# Patient Record
Sex: FEMALE | Race: WHITE | NOT HISPANIC OR LATINO | Employment: OTHER | URBAN - METROPOLITAN AREA
[De-identification: names, ages, dates, MRNs, and addresses within clinical notes are randomized per-mention and may not be internally consistent; named-entity substitution may affect disease eponyms.]

---

## 2017-01-02 ENCOUNTER — APPOINTMENT (OUTPATIENT)
Dept: LAB | Facility: HOSPITAL | Age: 77
End: 2017-01-02
Payer: MEDICARE

## 2017-01-02 DIAGNOSIS — I48.0 PAROXYSMAL ATRIAL FIBRILLATION (HCC): ICD-10-CM

## 2017-01-02 LAB
INR PPP: 1.27 (ref 0.86–1.16)
PROTHROMBIN TIME: 13.4 SECONDS (ref 9.4–11.7)

## 2017-01-02 PROCEDURE — 36415 COLL VENOUS BLD VENIPUNCTURE: CPT

## 2017-01-02 PROCEDURE — 85610 PROTHROMBIN TIME: CPT

## 2017-01-03 ENCOUNTER — GENERIC CONVERSION - ENCOUNTER (OUTPATIENT)
Dept: OTHER | Facility: OTHER | Age: 77
End: 2017-01-03

## 2017-01-05 ENCOUNTER — GENERIC CONVERSION - ENCOUNTER (OUTPATIENT)
Dept: OTHER | Facility: OTHER | Age: 77
End: 2017-01-05

## 2017-01-16 DIAGNOSIS — I48.0 PAROXYSMAL ATRIAL FIBRILLATION (HCC): ICD-10-CM

## 2017-01-16 DIAGNOSIS — I48.91 ATRIAL FIBRILLATION (HCC): ICD-10-CM

## 2017-01-16 DIAGNOSIS — I10 ESSENTIAL (PRIMARY) HYPERTENSION: ICD-10-CM

## 2017-01-19 ENCOUNTER — GENERIC CONVERSION - ENCOUNTER (OUTPATIENT)
Dept: OTHER | Facility: OTHER | Age: 77
End: 2017-01-19

## 2017-01-19 ENCOUNTER — ALLSCRIPTS OFFICE VISIT (OUTPATIENT)
Dept: OTHER | Facility: OTHER | Age: 77
End: 2017-01-19

## 2017-01-19 ENCOUNTER — APPOINTMENT (OUTPATIENT)
Dept: LAB | Facility: HOSPITAL | Age: 77
End: 2017-01-19
Payer: MEDICARE

## 2017-01-19 ENCOUNTER — TRANSCRIBE ORDERS (OUTPATIENT)
Dept: ADMINISTRATIVE | Facility: HOSPITAL | Age: 77
End: 2017-01-19

## 2017-01-19 DIAGNOSIS — I48.0 PAROXYSMAL ATRIAL FIBRILLATION (HCC): ICD-10-CM

## 2017-01-19 DIAGNOSIS — I10 ESSENTIAL HYPERTENSION, MALIGNANT: ICD-10-CM

## 2017-01-19 DIAGNOSIS — I48.0 PAROXYSMAL ATRIAL FIBRILLATION (HCC): Primary | ICD-10-CM

## 2017-01-19 LAB
ANION GAP SERPL CALCULATED.3IONS-SCNC: 7 MMOL/L (ref 4–13)
BUN SERPL-MCNC: 20 MG/DL (ref 5–25)
CALCIUM SERPL-MCNC: 9 MG/DL (ref 8.3–10.1)
CHLORIDE SERPL-SCNC: 103 MMOL/L (ref 100–108)
CO2 SERPL-SCNC: 31 MMOL/L (ref 21–32)
CREAT SERPL-MCNC: 0.76 MG/DL (ref 0.6–1.3)
ERYTHROCYTE [DISTWIDTH] IN BLOOD BY AUTOMATED COUNT: 15.3 % (ref 11.6–15.1)
GFR SERPL CREATININE-BSD FRML MDRD: >60 ML/MIN/1.73SQ M
GLUCOSE SERPL-MCNC: 89 MG/DL (ref 65–140)
HCT VFR BLD AUTO: 41.8 % (ref 37–47)
HGB BLD-MCNC: 13.6 G/DL (ref 12–16)
INR PPP: 2.22 (ref 0.86–1.16)
MCH RBC QN AUTO: 28.9 PG (ref 27–31)
MCHC RBC AUTO-ENTMCNC: 32.5 G/DL (ref 31.4–37.4)
MCV RBC AUTO: 89 FL (ref 82–98)
PLATELET # BLD AUTO: 198 THOUSANDS/UL (ref 130–400)
PMV BLD AUTO: 9.2 FL (ref 8.9–12.7)
POTASSIUM SERPL-SCNC: 4.5 MMOL/L (ref 3.5–5.3)
PROTHROMBIN TIME: 23.9 SECONDS (ref 9.4–11.7)
RBC # BLD AUTO: 4.71 MILLION/UL (ref 4.2–5.4)
SODIUM SERPL-SCNC: 141 MMOL/L (ref 136–145)
WBC # BLD AUTO: 7.9 THOUSAND/UL (ref 4.8–10.8)

## 2017-01-19 PROCEDURE — 80048 BASIC METABOLIC PNL TOTAL CA: CPT

## 2017-01-19 PROCEDURE — 85027 COMPLETE CBC AUTOMATED: CPT

## 2017-01-19 PROCEDURE — 85610 PROTHROMBIN TIME: CPT

## 2017-01-19 PROCEDURE — 36415 COLL VENOUS BLD VENIPUNCTURE: CPT

## 2017-01-20 ENCOUNTER — GENERIC CONVERSION - ENCOUNTER (OUTPATIENT)
Dept: OTHER | Facility: OTHER | Age: 77
End: 2017-01-20

## 2017-02-07 ENCOUNTER — ANESTHESIA EVENT (OUTPATIENT)
Dept: GASTROENTEROLOGY | Facility: HOSPITAL | Age: 77
End: 2017-02-07
Payer: MEDICARE

## 2017-02-07 RX ORDER — PRAMIPEXOLE DIHYDROCHLORIDE 0.25 MG/1
0.5 TABLET ORAL
Status: ON HOLD | COMMUNITY
End: 2017-09-20 | Stop reason: ALTCHOICE

## 2017-02-07 RX ORDER — GABAPENTIN 800 MG/1
800 TABLET ORAL 4 TIMES DAILY
COMMUNITY
End: 2018-02-28 | Stop reason: SDUPTHER

## 2017-02-07 RX ORDER — METOPROLOL TARTRATE 50 MG/1
50 TABLET, FILM COATED ORAL 2 TIMES DAILY
COMMUNITY
End: 2018-10-17 | Stop reason: SDUPTHER

## 2017-02-07 RX ORDER — DIPHENOXYLATE HYDROCHLORIDE AND ATROPINE SULFATE 2.5; .025 MG/1; MG/1
1 TABLET ORAL DAILY
COMMUNITY

## 2017-02-07 RX ORDER — LISINOPRIL 40 MG/1
40 TABLET ORAL DAILY
COMMUNITY
End: 2018-02-04 | Stop reason: SDUPTHER

## 2017-02-07 RX ORDER — PANTOPRAZOLE SODIUM 40 MG/1
40 TABLET, DELAYED RELEASE ORAL DAILY
COMMUNITY
End: 2018-03-12 | Stop reason: ALTCHOICE

## 2017-02-07 RX ORDER — WARFARIN SODIUM 5 MG/1
7.5 TABLET ORAL
COMMUNITY
End: 2018-10-17 | Stop reason: SDUPTHER

## 2017-02-08 ENCOUNTER — GENERIC CONVERSION - ENCOUNTER (OUTPATIENT)
Dept: OTHER | Facility: OTHER | Age: 77
End: 2017-02-08

## 2017-02-08 ENCOUNTER — HOSPITAL ENCOUNTER (OUTPATIENT)
Facility: HOSPITAL | Age: 77
Setting detail: OUTPATIENT SURGERY
Discharge: HOME | End: 2017-02-08
Attending: INTERNAL MEDICINE | Admitting: INTERNAL MEDICINE
Payer: MEDICARE

## 2017-02-08 ENCOUNTER — ANESTHESIA (OUTPATIENT)
Dept: GASTROENTEROLOGY | Facility: HOSPITAL | Age: 77
End: 2017-02-08
Payer: MEDICARE

## 2017-02-08 VITALS
OXYGEN SATURATION: 94 % | RESPIRATION RATE: 14 BRPM | HEIGHT: 64 IN | SYSTOLIC BLOOD PRESSURE: 108 MMHG | DIASTOLIC BLOOD PRESSURE: 60 MMHG | BODY MASS INDEX: 38.93 KG/M2 | HEART RATE: 80 BPM | WEIGHT: 228 LBS | TEMPERATURE: 97.5 F

## 2017-02-08 DIAGNOSIS — K62.5 HEMORRHAGE OF ANUS AND RECTUM: ICD-10-CM

## 2017-02-08 PROCEDURE — 88305 TISSUE EXAM BY PATHOLOGIST: CPT | Performed by: INTERNAL MEDICINE

## 2017-02-08 RX ORDER — EPHEDRINE SULFATE 50 MG/ML
INJECTION, SOLUTION INTRAVENOUS AS NEEDED
Status: DISCONTINUED | OUTPATIENT
Start: 2017-02-08 | End: 2017-02-08 | Stop reason: SURG

## 2017-02-08 RX ORDER — PROPOFOL 10 MG/ML
INJECTION, EMULSION INTRAVENOUS AS NEEDED
Status: DISCONTINUED | OUTPATIENT
Start: 2017-02-08 | End: 2017-02-08 | Stop reason: SURG

## 2017-02-08 RX ORDER — SODIUM CHLORIDE 9 MG/ML
20 INJECTION, SOLUTION INTRAVENOUS CONTINUOUS
Status: DISCONTINUED | OUTPATIENT
Start: 2017-02-08 | End: 2017-02-08 | Stop reason: HOSPADM

## 2017-02-08 RX ORDER — PROPOFOL 10 MG/ML
INJECTION, EMULSION INTRAVENOUS CONTINUOUS PRN
Status: DISCONTINUED | OUTPATIENT
Start: 2017-02-08 | End: 2017-02-08 | Stop reason: SURG

## 2017-02-08 RX ADMIN — PROPOFOL 100 MG: 10 INJECTION, EMULSION INTRAVENOUS at 15:23

## 2017-02-08 RX ADMIN — EPHEDRINE SULFATE 5 MG: 50 INJECTION, SOLUTION INTRAMUSCULAR; INTRAVENOUS; SUBCUTANEOUS at 15:44

## 2017-02-08 RX ADMIN — PROPOFOL 120 MCG/KG/MIN: 10 INJECTION, EMULSION INTRAVENOUS at 15:23

## 2017-02-08 RX ADMIN — SODIUM CHLORIDE 20 ML/HR: 0.9 INJECTION, SOLUTION INTRAVENOUS at 15:02

## 2017-02-13 ENCOUNTER — GENERIC CONVERSION - ENCOUNTER (OUTPATIENT)
Dept: OTHER | Facility: OTHER | Age: 77
End: 2017-02-13

## 2017-02-14 ENCOUNTER — ALLSCRIPTS OFFICE VISIT (OUTPATIENT)
Dept: OTHER | Facility: OTHER | Age: 77
End: 2017-02-14

## 2017-02-20 DIAGNOSIS — I48.0 PAROXYSMAL ATRIAL FIBRILLATION (HCC): ICD-10-CM

## 2017-02-24 ENCOUNTER — APPOINTMENT (OUTPATIENT)
Dept: AUDIOLOGY | Facility: CLINIC | Age: 77
End: 2017-02-24
Payer: MEDICARE

## 2017-02-24 PROCEDURE — 92567 TYMPANOMETRY: CPT | Performed by: AUDIOLOGIST

## 2017-02-24 PROCEDURE — 92557 COMPREHENSIVE HEARING TEST: CPT | Performed by: AUDIOLOGIST

## 2017-02-25 ENCOUNTER — APPOINTMENT (OUTPATIENT)
Dept: LAB | Facility: HOSPITAL | Age: 77
End: 2017-02-25
Payer: MEDICARE

## 2017-02-25 ENCOUNTER — TRANSCRIBE ORDERS (OUTPATIENT)
Dept: ADMINISTRATIVE | Facility: HOSPITAL | Age: 77
End: 2017-02-25

## 2017-02-25 DIAGNOSIS — I48.0 PAROXYSMAL ATRIAL FIBRILLATION (HCC): ICD-10-CM

## 2017-02-25 DIAGNOSIS — I48.91 ATRIAL FIBRILLATION, UNSPECIFIED TYPE (HCC): Primary | ICD-10-CM

## 2017-02-25 DIAGNOSIS — I48.0 PAROXYSMAL ATRIAL FIBRILLATION (HCC): Primary | ICD-10-CM

## 2017-02-25 DIAGNOSIS — I10 ESSENTIAL HYPERTENSION, MALIGNANT: ICD-10-CM

## 2017-02-25 DIAGNOSIS — I48.91 ATRIAL FIBRILLATION, UNSPECIFIED TYPE (HCC): ICD-10-CM

## 2017-02-25 LAB
ALBUMIN SERPL BCP-MCNC: 3.7 G/DL (ref 3.5–5)
ALP SERPL-CCNC: 79 U/L (ref 46–116)
ALT SERPL W P-5'-P-CCNC: 29 U/L (ref 12–78)
AST SERPL W P-5'-P-CCNC: 22 U/L (ref 5–45)
BILIRUB DIRECT SERPL-MCNC: 0.2 MG/DL (ref 0–0.2)
BILIRUB SERPL-MCNC: 0.8 MG/DL (ref 0.2–1)
CHOLEST SERPL-MCNC: 147 MG/DL (ref 50–200)
HDLC SERPL-MCNC: 41 MG/DL (ref 40–60)
INR PPP: 2.4 (ref 0.86–1.16)
LDLC SERPL CALC-MCNC: 84 MG/DL (ref 0–100)
PROT SERPL-MCNC: 7.6 G/DL (ref 6.4–8.2)
PROTHROMBIN TIME: 25.9 SECONDS (ref 9.4–11.7)
TRIGL SERPL-MCNC: 111 MG/DL
TSH SERPL DL<=0.05 MIU/L-ACNC: 1.1 UIU/ML (ref 0.36–3.74)

## 2017-02-25 PROCEDURE — 80061 LIPID PANEL: CPT | Performed by: INTERNAL MEDICINE

## 2017-02-25 PROCEDURE — 84443 ASSAY THYROID STIM HORMONE: CPT

## 2017-02-25 PROCEDURE — 36415 COLL VENOUS BLD VENIPUNCTURE: CPT

## 2017-02-25 PROCEDURE — 85610 PROTHROMBIN TIME: CPT

## 2017-02-25 PROCEDURE — 80076 HEPATIC FUNCTION PANEL: CPT

## 2017-02-27 ENCOUNTER — GENERIC CONVERSION - ENCOUNTER (OUTPATIENT)
Dept: OTHER | Facility: OTHER | Age: 77
End: 2017-02-27

## 2017-03-01 ENCOUNTER — GENERIC CONVERSION - ENCOUNTER (OUTPATIENT)
Dept: OTHER | Facility: OTHER | Age: 77
End: 2017-03-01

## 2017-03-10 ENCOUNTER — GENERIC CONVERSION - ENCOUNTER (OUTPATIENT)
Dept: OTHER | Facility: OTHER | Age: 77
End: 2017-03-10

## 2017-03-10 LAB
LEFT EYE DIABETIC RETINOPATHY: NORMAL
RIGHT EYE DIABETIC RETINOPATHY: NORMAL

## 2017-03-23 ENCOUNTER — TRANSCRIBE ORDERS (OUTPATIENT)
Dept: ADMINISTRATIVE | Facility: HOSPITAL | Age: 77
End: 2017-03-23

## 2017-03-23 DIAGNOSIS — S86.012A ACHILLES TENDON TEAR, LEFT, INITIAL ENCOUNTER: Primary | ICD-10-CM

## 2017-03-29 DIAGNOSIS — I48.0 PAROXYSMAL ATRIAL FIBRILLATION (HCC): ICD-10-CM

## 2017-03-30 ENCOUNTER — GENERIC CONVERSION - ENCOUNTER (OUTPATIENT)
Dept: OTHER | Facility: OTHER | Age: 77
End: 2017-03-30

## 2017-03-30 ENCOUNTER — TRANSCRIBE ORDERS (OUTPATIENT)
Dept: ADMINISTRATIVE | Facility: HOSPITAL | Age: 77
End: 2017-03-30

## 2017-03-30 ENCOUNTER — APPOINTMENT (OUTPATIENT)
Dept: LAB | Facility: HOSPITAL | Age: 77
End: 2017-03-30
Payer: MEDICARE

## 2017-03-30 ENCOUNTER — HOSPITAL ENCOUNTER (OUTPATIENT)
Dept: RADIOLOGY | Facility: HOSPITAL | Age: 77
Discharge: HOME | End: 2017-03-30
Attending: PODIATRIST | Admitting: RADIOLOGY
Payer: MEDICARE

## 2017-03-30 DIAGNOSIS — I48.0 PAROXYSMAL ATRIAL FIBRILLATION (HCC): Primary | ICD-10-CM

## 2017-03-30 DIAGNOSIS — I48.0 PAROXYSMAL ATRIAL FIBRILLATION (HCC): ICD-10-CM

## 2017-03-30 DIAGNOSIS — S86.012A ACHILLES TENDON TEAR, LEFT, INITIAL ENCOUNTER: ICD-10-CM

## 2017-03-30 LAB
INR PPP: 2.5 (ref 0.86–1.16)
PROTHROMBIN TIME: 27 SECONDS (ref 9.4–11.7)

## 2017-03-30 PROCEDURE — 85610 PROTHROMBIN TIME: CPT

## 2017-03-30 PROCEDURE — 76881 US COMPL JOINT R-T W/IMG: CPT

## 2017-03-30 PROCEDURE — 36415 COLL VENOUS BLD VENIPUNCTURE: CPT

## 2017-04-03 ENCOUNTER — APPOINTMENT (OUTPATIENT)
Dept: LAB | Facility: HOSPITAL | Age: 77
End: 2017-04-03
Payer: MEDICARE

## 2017-04-03 DIAGNOSIS — I48.0 PAROXYSMAL ATRIAL FIBRILLATION (HCC): ICD-10-CM

## 2017-04-03 LAB
INR PPP: 2.29 (ref 0.86–1.16)
PROTHROMBIN TIME: 24.7 SECONDS (ref 9.4–11.7)

## 2017-04-03 PROCEDURE — 85610 PROTHROMBIN TIME: CPT

## 2017-04-04 ENCOUNTER — GENERIC CONVERSION - ENCOUNTER (OUTPATIENT)
Dept: OTHER | Facility: OTHER | Age: 77
End: 2017-04-04

## 2017-04-19 ENCOUNTER — ALLSCRIPTS OFFICE VISIT (OUTPATIENT)
Dept: OTHER | Facility: OTHER | Age: 77
End: 2017-04-19

## 2017-05-03 ENCOUNTER — ALLSCRIPTS OFFICE VISIT (OUTPATIENT)
Dept: OTHER | Facility: OTHER | Age: 77
End: 2017-05-03

## 2017-05-03 DIAGNOSIS — I48.0 PAROXYSMAL ATRIAL FIBRILLATION (HCC): ICD-10-CM

## 2017-05-16 ENCOUNTER — TRANSCRIBE ORDERS (OUTPATIENT)
Dept: ADMINISTRATIVE | Facility: HOSPITAL | Age: 77
End: 2017-05-16

## 2017-05-16 ENCOUNTER — APPOINTMENT (OUTPATIENT)
Dept: LAB | Facility: HOSPITAL | Age: 77
End: 2017-05-16
Payer: MEDICARE

## 2017-05-16 DIAGNOSIS — I48.0 PAROXYSMAL ATRIAL FIBRILLATION (HCC): ICD-10-CM

## 2017-05-16 LAB
INR PPP: 2.3 (ref 0.86–1.16)
PROTHROMBIN TIME: 24.8 SECONDS (ref 9.4–11.7)

## 2017-05-16 PROCEDURE — 85610 PROTHROMBIN TIME: CPT

## 2017-05-16 PROCEDURE — 36415 COLL VENOUS BLD VENIPUNCTURE: CPT

## 2017-05-17 ENCOUNTER — GENERIC CONVERSION - ENCOUNTER (OUTPATIENT)
Dept: OTHER | Facility: OTHER | Age: 77
End: 2017-05-17

## 2017-05-26 ENCOUNTER — GENERIC CONVERSION - ENCOUNTER (OUTPATIENT)
Dept: OTHER | Facility: OTHER | Age: 77
End: 2017-05-26

## 2017-06-07 DIAGNOSIS — I48.0 PAROXYSMAL ATRIAL FIBRILLATION (HCC): ICD-10-CM

## 2017-06-07 DIAGNOSIS — I10 ESSENTIAL (PRIMARY) HYPERTENSION: ICD-10-CM

## 2017-06-12 ENCOUNTER — TRANSCRIBE ORDERS (OUTPATIENT)
Dept: ADMINISTRATIVE | Facility: HOSPITAL | Age: 77
End: 2017-06-12

## 2017-06-12 ENCOUNTER — GENERIC CONVERSION - ENCOUNTER (OUTPATIENT)
Dept: OTHER | Facility: OTHER | Age: 77
End: 2017-06-12

## 2017-06-12 ENCOUNTER — APPOINTMENT (OUTPATIENT)
Dept: LAB | Facility: HOSPITAL | Age: 77
End: 2017-06-12
Payer: MEDICARE

## 2017-06-12 DIAGNOSIS — I48.0 PAROXYSMAL ATRIAL FIBRILLATION (HCC): ICD-10-CM

## 2017-06-12 DIAGNOSIS — I48.0 PAROXYSMAL ATRIAL FIBRILLATION (HCC): Primary | ICD-10-CM

## 2017-06-12 LAB
INR PPP: 2.3 (ref 0.86–1.16)
PROTHROMBIN TIME: 24.8 SECONDS (ref 9.4–11.7)

## 2017-06-12 PROCEDURE — 85610 PROTHROMBIN TIME: CPT

## 2017-06-12 PROCEDURE — 36415 COLL VENOUS BLD VENIPUNCTURE: CPT

## 2017-06-13 ENCOUNTER — GENERIC CONVERSION - ENCOUNTER (OUTPATIENT)
Dept: OTHER | Facility: OTHER | Age: 77
End: 2017-06-13

## 2017-06-14 ENCOUNTER — APPOINTMENT (OUTPATIENT)
Dept: LAB | Facility: HOSPITAL | Age: 77
End: 2017-06-14
Attending: PSYCHIATRY & NEUROLOGY
Payer: MEDICARE

## 2017-06-14 ENCOUNTER — TRANSCRIBE ORDERS (OUTPATIENT)
Dept: ADMINISTRATIVE | Facility: HOSPITAL | Age: 77
End: 2017-06-14

## 2017-06-14 DIAGNOSIS — E13.40 DIABETIC NEUROPATHY ASSOCIATED WITH OTHER SPECIFIED DIABETES MELLITUS: Primary | ICD-10-CM

## 2017-06-14 DIAGNOSIS — E13.40 DIABETIC NEUROPATHY ASSOCIATED WITH OTHER SPECIFIED DIABETES MELLITUS: ICD-10-CM

## 2017-06-14 LAB
EST. AVERAGE GLUCOSE BLD GHB EST-MCNC: 120 MG/DL
HBA1C MFR BLD: 5.8 % (ref 4.2–6.3)

## 2017-06-14 PROCEDURE — 83036 HEMOGLOBIN GLYCOSYLATED A1C: CPT

## 2017-06-14 PROCEDURE — 36415 COLL VENOUS BLD VENIPUNCTURE: CPT

## 2017-06-26 ENCOUNTER — ALLSCRIPTS OFFICE VISIT (OUTPATIENT)
Dept: OTHER | Facility: OTHER | Age: 77
End: 2017-06-26

## 2017-07-12 DIAGNOSIS — I48.0 PAROXYSMAL ATRIAL FIBRILLATION (HCC): ICD-10-CM

## 2017-07-14 ENCOUNTER — TRANSCRIBE ORDERS (OUTPATIENT)
Dept: ADMINISTRATIVE | Facility: HOSPITAL | Age: 77
End: 2017-07-14

## 2017-07-14 ENCOUNTER — APPOINTMENT (OUTPATIENT)
Dept: LAB | Facility: HOSPITAL | Age: 77
End: 2017-07-14
Payer: MEDICARE

## 2017-07-14 DIAGNOSIS — I48.0 PAROXYSMAL ATRIAL FIBRILLATION (HCC): ICD-10-CM

## 2017-07-14 DIAGNOSIS — I48.0 PAROXYSMAL ATRIAL FIBRILLATION (HCC): Primary | ICD-10-CM

## 2017-07-14 LAB
INR PPP: 2.98 (ref 0.86–1.16)
PROTHROMBIN TIME: 31.6 SECONDS (ref 9.4–11.7)

## 2017-07-14 PROCEDURE — 85610 PROTHROMBIN TIME: CPT

## 2017-07-14 PROCEDURE — 36415 COLL VENOUS BLD VENIPUNCTURE: CPT

## 2017-07-17 ENCOUNTER — GENERIC CONVERSION - ENCOUNTER (OUTPATIENT)
Dept: OTHER | Facility: OTHER | Age: 77
End: 2017-07-17

## 2017-07-19 ENCOUNTER — HOSPITAL ENCOUNTER (OUTPATIENT)
Dept: NON INVASIVE DIAGNOSTICS | Facility: CLINIC | Age: 77
Discharge: HOME | End: 2017-07-19
Payer: MEDICARE

## 2017-07-19 ENCOUNTER — APPOINTMENT (OUTPATIENT)
Dept: LAB | Facility: CLINIC | Age: 77
End: 2017-07-19
Payer: MEDICARE

## 2017-07-19 ENCOUNTER — ALLSCRIPTS OFFICE VISIT (OUTPATIENT)
Dept: OTHER | Facility: OTHER | Age: 77
End: 2017-07-19

## 2017-07-19 DIAGNOSIS — I48.0 PAROXYSMAL ATRIAL FIBRILLATION (HCC): ICD-10-CM

## 2017-07-19 DIAGNOSIS — I10 ESSENTIAL (PRIMARY) HYPERTENSION: ICD-10-CM

## 2017-07-19 LAB
ANION GAP SERPL CALCULATED.3IONS-SCNC: 8 MMOL/L (ref 4–13)
BUN SERPL-MCNC: 22 MG/DL (ref 5–25)
CALCIUM SERPL-MCNC: 9.6 MG/DL (ref 8.3–10.1)
CHLORIDE SERPL-SCNC: 105 MMOL/L (ref 100–108)
CO2 SERPL-SCNC: 31 MMOL/L (ref 21–32)
CREAT SERPL-MCNC: 0.79 MG/DL (ref 0.6–1.3)
GFR SERPL CREATININE-BSD FRML MDRD: >60 ML/MIN/1.73SQ M
GLUCOSE SERPL-MCNC: 93 MG/DL (ref 65–140)
POTASSIUM SERPL-SCNC: 4.4 MMOL/L (ref 3.5–5.3)
SODIUM SERPL-SCNC: 144 MMOL/L (ref 136–145)

## 2017-07-19 PROCEDURE — 80048 BASIC METABOLIC PNL TOTAL CA: CPT

## 2017-07-19 PROCEDURE — 36415 COLL VENOUS BLD VENIPUNCTURE: CPT

## 2017-07-19 PROCEDURE — 93306 TTE W/DOPPLER COMPLETE: CPT

## 2017-07-21 ENCOUNTER — GENERIC CONVERSION - ENCOUNTER (OUTPATIENT)
Dept: OTHER | Facility: OTHER | Age: 77
End: 2017-07-21

## 2017-08-01 ENCOUNTER — ALLSCRIPTS OFFICE VISIT (OUTPATIENT)
Dept: OTHER | Facility: OTHER | Age: 77
End: 2017-08-01

## 2017-08-10 ENCOUNTER — ALLSCRIPTS OFFICE VISIT (OUTPATIENT)
Dept: OTHER | Facility: OTHER | Age: 77
End: 2017-08-10

## 2017-08-16 DIAGNOSIS — Z12.31 ENCOUNTER FOR SCREENING MAMMOGRAM FOR MALIGNANT NEOPLASM OF BREAST: ICD-10-CM

## 2017-08-16 DIAGNOSIS — I48.0 PAROXYSMAL ATRIAL FIBRILLATION (HCC): ICD-10-CM

## 2017-08-25 ENCOUNTER — APPOINTMENT (OUTPATIENT)
Dept: LAB | Facility: HOSPITAL | Age: 77
End: 2017-08-25
Payer: MEDICARE

## 2017-08-25 ENCOUNTER — GENERIC CONVERSION - ENCOUNTER (OUTPATIENT)
Dept: OTHER | Facility: OTHER | Age: 77
End: 2017-08-25

## 2017-08-25 ENCOUNTER — TRANSCRIBE ORDERS (OUTPATIENT)
Dept: ADMINISTRATIVE | Facility: HOSPITAL | Age: 77
End: 2017-08-25

## 2017-08-25 DIAGNOSIS — I48.0 PAROXYSMAL ATRIAL FIBRILLATION (HCC): Primary | ICD-10-CM

## 2017-08-25 LAB
INR PPP: 3.51 (ref 0.86–1.16)
PROTHROMBIN TIME: 37.3 SECONDS (ref 9.4–11.7)

## 2017-08-25 PROCEDURE — 85610 PROTHROMBIN TIME: CPT | Performed by: INTERNAL MEDICINE

## 2017-08-25 PROCEDURE — 36415 COLL VENOUS BLD VENIPUNCTURE: CPT | Performed by: INTERNAL MEDICINE

## 2017-08-26 ENCOUNTER — GENERIC CONVERSION - ENCOUNTER (OUTPATIENT)
Dept: OTHER | Facility: OTHER | Age: 77
End: 2017-08-26

## 2017-08-29 ENCOUNTER — ALLSCRIPTS OFFICE VISIT (OUTPATIENT)
Dept: OTHER | Facility: OTHER | Age: 77
End: 2017-08-29

## 2017-09-08 ENCOUNTER — APPOINTMENT (OUTPATIENT)
Dept: LAB | Facility: HOSPITAL | Age: 77
End: 2017-09-08
Payer: MEDICARE

## 2017-09-08 LAB
INR PPP: 2.61 (ref 0.86–1.16)
PROTHROMBIN TIME: 27.7 SECONDS (ref 9.4–11.7)

## 2017-09-08 PROCEDURE — 85610 PROTHROMBIN TIME: CPT | Performed by: INTERNAL MEDICINE

## 2017-09-08 PROCEDURE — 36415 COLL VENOUS BLD VENIPUNCTURE: CPT | Performed by: INTERNAL MEDICINE

## 2017-09-11 ENCOUNTER — GENERIC CONVERSION - ENCOUNTER (OUTPATIENT)
Dept: OTHER | Facility: OTHER | Age: 77
End: 2017-09-11

## 2017-09-11 ENCOUNTER — ALLSCRIPTS OFFICE VISIT (OUTPATIENT)
Dept: OTHER | Facility: OTHER | Age: 77
End: 2017-09-11

## 2017-09-20 ENCOUNTER — ANESTHESIA EVENT (OUTPATIENT)
Dept: GASTROENTEROLOGY | Facility: HOSPITAL | Age: 77
End: 2017-09-20
Payer: MEDICARE

## 2017-09-20 ENCOUNTER — ANESTHESIA (OUTPATIENT)
Dept: GASTROENTEROLOGY | Facility: HOSPITAL | Age: 77
End: 2017-09-20
Payer: MEDICARE

## 2017-09-20 ENCOUNTER — GENERIC CONVERSION - ENCOUNTER (OUTPATIENT)
Dept: OTHER | Facility: OTHER | Age: 77
End: 2017-09-20

## 2017-09-20 ENCOUNTER — HOSPITAL ENCOUNTER (OUTPATIENT)
Facility: HOSPITAL | Age: 77
Setting detail: OUTPATIENT SURGERY
Discharge: HOME | End: 2017-09-20
Attending: INTERNAL MEDICINE | Admitting: INTERNAL MEDICINE
Payer: MEDICARE

## 2017-09-20 VITALS
RESPIRATION RATE: 16 BRPM | DIASTOLIC BLOOD PRESSURE: 74 MMHG | OXYGEN SATURATION: 100 % | SYSTOLIC BLOOD PRESSURE: 140 MMHG | TEMPERATURE: 97.6 F | HEART RATE: 60 BPM | HEIGHT: 64 IN | BODY MASS INDEX: 39.27 KG/M2 | WEIGHT: 230 LBS

## 2017-09-20 DIAGNOSIS — I48.0 PAROXYSMAL ATRIAL FIBRILLATION (HCC): ICD-10-CM

## 2017-09-20 DIAGNOSIS — K21.9 ESOPHAGEAL REFLUX: ICD-10-CM

## 2017-09-20 PROCEDURE — 88305 TISSUE EXAM BY PATHOLOGIST: CPT | Performed by: INTERNAL MEDICINE

## 2017-09-20 RX ORDER — PROPOFOL 10 MG/ML
INJECTION, EMULSION INTRAVENOUS CONTINUOUS PRN
Status: DISCONTINUED | OUTPATIENT
Start: 2017-09-20 | End: 2017-09-20 | Stop reason: SURG

## 2017-09-20 RX ORDER — LIDOCAINE HYDROCHLORIDE 10 MG/ML
INJECTION, SOLUTION EPIDURAL; INFILTRATION; INTRACAUDAL; PERINEURAL AS NEEDED
Status: DISCONTINUED | OUTPATIENT
Start: 2017-09-20 | End: 2017-09-20 | Stop reason: SURG

## 2017-09-20 RX ORDER — SODIUM CHLORIDE 9 MG/ML
50 INJECTION, SOLUTION INTRAVENOUS CONTINUOUS
Status: DISCONTINUED | OUTPATIENT
Start: 2017-09-20 | End: 2017-09-20 | Stop reason: HOSPADM

## 2017-09-20 RX ORDER — PROPOFOL 10 MG/ML
INJECTION, EMULSION INTRAVENOUS AS NEEDED
Status: DISCONTINUED | OUTPATIENT
Start: 2017-09-20 | End: 2017-09-20 | Stop reason: SURG

## 2017-09-20 RX ORDER — CLOBETASOL PROPIONATE 0.5 MG/G
CREAM TOPICAL WEEKLY
COMMUNITY
Start: 2018-12-01 | End: 2020-05-14 | Stop reason: SDUPTHER

## 2017-09-20 RX ADMIN — PROPOFOL 50 MG: 10 INJECTION, EMULSION INTRAVENOUS at 14:51

## 2017-09-20 RX ADMIN — PROPOFOL 50 MCG/KG/MIN: 10 INJECTION, EMULSION INTRAVENOUS at 14:51

## 2017-09-20 RX ADMIN — LIDOCAINE HYDROCHLORIDE 50 MG: 10 INJECTION, SOLUTION EPIDURAL; INFILTRATION; INTRACAUDAL; PERINEURAL at 14:50

## 2017-09-26 ENCOUNTER — GENERIC CONVERSION - ENCOUNTER (OUTPATIENT)
Dept: OTHER | Facility: OTHER | Age: 77
End: 2017-09-26

## 2017-10-04 ENCOUNTER — TRANSCRIBE ORDERS (OUTPATIENT)
Dept: ADMINISTRATIVE | Facility: HOSPITAL | Age: 77
End: 2017-10-04

## 2017-10-04 ENCOUNTER — APPOINTMENT (OUTPATIENT)
Dept: LAB | Facility: HOSPITAL | Age: 77
End: 2017-10-04
Payer: MEDICARE

## 2017-10-04 ENCOUNTER — GENERIC CONVERSION - ENCOUNTER (OUTPATIENT)
Dept: OTHER | Facility: OTHER | Age: 77
End: 2017-10-04

## 2017-10-04 DIAGNOSIS — I48.0 PAROXYSMAL ATRIAL FIBRILLATION (HCC): Primary | ICD-10-CM

## 2017-10-04 DIAGNOSIS — I48.0 PAROXYSMAL ATRIAL FIBRILLATION (HCC): ICD-10-CM

## 2017-10-04 LAB
INR PPP: 1.72 (ref 0.86–1.16)
PROTHROMBIN TIME: 18.2 SECONDS (ref 9.4–11.7)

## 2017-10-04 PROCEDURE — 36415 COLL VENOUS BLD VENIPUNCTURE: CPT

## 2017-10-04 PROCEDURE — 85610 PROTHROMBIN TIME: CPT

## 2017-10-17 ENCOUNTER — GENERIC CONVERSION - ENCOUNTER (OUTPATIENT)
Dept: OTHER | Facility: OTHER | Age: 77
End: 2017-10-17

## 2017-10-19 ENCOUNTER — APPOINTMENT (OUTPATIENT)
Dept: LAB | Facility: HOSPITAL | Age: 77
End: 2017-10-19
Payer: MEDICARE

## 2017-10-19 ENCOUNTER — GENERIC CONVERSION - ENCOUNTER (OUTPATIENT)
Dept: OTHER | Facility: OTHER | Age: 77
End: 2017-10-19

## 2017-10-19 DIAGNOSIS — I48.0 PAROXYSMAL ATRIAL FIBRILLATION (HCC): ICD-10-CM

## 2017-10-19 LAB
INR PPP: 2.83 (ref 0.86–1.16)
PROTHROMBIN TIME: 30 SECONDS (ref 9.4–11.7)

## 2017-10-19 PROCEDURE — 36415 COLL VENOUS BLD VENIPUNCTURE: CPT

## 2017-10-19 PROCEDURE — 85610 PROTHROMBIN TIME: CPT

## 2017-10-23 ENCOUNTER — GENERIC CONVERSION - ENCOUNTER (OUTPATIENT)
Dept: OTHER | Facility: OTHER | Age: 77
End: 2017-10-23

## 2017-10-25 ENCOUNTER — ALLSCRIPTS OFFICE VISIT (OUTPATIENT)
Dept: OTHER | Facility: OTHER | Age: 77
End: 2017-10-25

## 2017-10-25 DIAGNOSIS — Z91.89 OTHER SPECIFIED PERSONAL RISK FACTORS, NOT ELSEWHERE CLASSIFIED: ICD-10-CM

## 2017-10-25 DIAGNOSIS — Z12.31 ENCOUNTER FOR SCREENING MAMMOGRAM FOR MALIGNANT NEOPLASM OF BREAST: ICD-10-CM

## 2017-10-25 DIAGNOSIS — I48.0 PAROXYSMAL ATRIAL FIBRILLATION (HCC): ICD-10-CM

## 2017-10-27 ENCOUNTER — ALLSCRIPTS OFFICE VISIT (OUTPATIENT)
Dept: OTHER | Facility: OTHER | Age: 77
End: 2017-10-27

## 2017-10-30 ENCOUNTER — ALLSCRIPTS OFFICE VISIT (OUTPATIENT)
Dept: OTHER | Facility: OTHER | Age: 77
End: 2017-10-30

## 2017-11-01 NOTE — PROGRESS NOTES
Assessment  1  Esophageal reflux (530 81) (K21 9)    Plan  Esophageal reflux    · RaNITidine HCl - 150 MG Oral Tablet (Zantac); TAKE 1 TABLET AT BEDTIME   Rx By: Margaret Donohue; Dispense: 90 Days ; #:90 Tablet; Refill: 1;For: Esophageal reflux; JAGUAR = N; Rx auto-faxed to [address]; Last Updated By: System, [employer]; 10/30/2017 10:20:44 AM   · Follow-up visit in 3 months Evaluation and Treatment  Follow-up  Status: Complete   Done: 12EWG4938   Ordered; For: Esophageal reflux; Ordered By: Margaret Donohue Performed:  Due: 32MZQ3157; Last Updated By: Abdifatah Stinson; 10/30/2017 10:26:22 AM    Discussion/Summary  Discussion Summary:   1  Chronic gastroesophageal reflux disease with possible symptoms of LPR  There was some concern about Carrero's esophagus in the past however this was not seen on the last 2 endoscopies with the last 1 being as recent as September 2017  since there is no evidence of Carrero's esophagus we discussed regarding the long-term maintenance of these medications  She is obviously concerned about side effects including chronic kidney disease and Alzheimer's type dementia however I did assure her that these studies have not been able to successfully prove an association  However due to her concerns we can certainly start ranitidine 150 mg at night  She will taper her omeprazole off  If she does well on that ranitidine we can potentially start to taper her off at that as well in the next 3 months  However the symptoms and also controlled she will go back on the omeprazole  If she was certainly like to come off the medications that we can discuss regarding STRETTA procedure versus fundoplication in the future  diet was discussed and recommended  Colon cancer screening  She is due for colonoscopy in 2020  Follow-up in the office in the next 3 months  Chief Complaint  Chief Complaint Free Text Note Form: Patient follow up for Carrero's  EGD done 9/20/17        History of Present Illness  HPI: 29-year-old lady who presents for follow-up off her gastroesophageal reflux and endoscopy  Her endoscopy was done in September 2017 which was unremarkable  She has been on Protonix and her symptoms have been doing well  She does occasionally have some hoarseness  She does feel gagging with pills  She has a postnasal drip  She has does see ENT physician  interest is that she had an endoscopy done in 2015 in [place] and was initially found to have Carrero's esophagus in 2012 but then in 2015 was negative except for esophagitis  At this time as well we did not see any evidence of Carrero's esophagus  She is concerned about potential side effects with the PPIs  History Reviewed: The history was obtained today from the patient and I agree with the documented history  Review of Systems  Complete-Female GI Adult:   Constitutional: No fever, no chills, feels well, no tiredness, no recent weight gain or weight loss  Eyes: No complaints of eye pain, no red eyes, no eyesight problems, no discharge, no dry eyes, no itching of eyes  ENT: no complaints of earache, no loss of hearing, no nose bleeds, no nasal discharge, no sore throat, no hoarseness  Cardiovascular: No complaints of slow heart rate, no fast heart rate, no chest pain, no palpitations, no leg claudication, no lower extremity edema  Respiratory: No complaints of shortness of breath, no wheezing, no cough, no SOB on exertion, no orthopnea, no PND  Gastrointestinal: acid reflux, but-- No complaints of abdominal pain, no constipation, no nausea or vomiting, no diarrhea, no bloody stools  Genitourinary: No complaints of dysuria, no incontinence, no pelvic pain, no dysmenorrhea, no vaginal discharge or bleeding  Musculoskeletal: No complaints of arthralgias, no myalgias, no joint swelling or stiffness, no limb pain or swelling  Integumentary: No complaints of skin rash or lesions, no itching, no skin wounds, no breast pain or lump     Neurological: No complaints of headache, no confusion, no convulsions, no numbness, no dizziness or fainting, no tingling, no limb weakness, no difficulty walking  Psychiatric: Not suicidal, no sleep disturbance, no anxiety or depression, no change in personality, no emotional problems  Endocrine: No complaints of proptosis, no hot flashes, no muscle weakness, no deepening of the voice, no feelings of weakness  Hematologic/Lymphatic: No complaints of swollen glands, no swollen glands in the neck, does not bleed easily, does not bruise easily  ROS Reviewed:   ROS reviewed  Active Problems  1  Achilles tendinitis of left lower extremity (726 71) (M76 62)   2  Acquired ankle/foot deformity (736 70) (M21 969)   3  AF (paroxysmal atrial fibrillation) (427 31) (I48 0)   4  Anticoagulant long-term use (V58 61) (Z79 01)   5  Arthritis (716 90) (M19 90)   6  At risk for bone density loss (V49 89) (Z91 89)   7  Atrial fibrillation (427 31) (I48 91)   8  History of Breast Cancer (V10 3)   9  Difficulty walking (719 7) (R26 2)   10  Encounter for screening mammogram for breast cancer (V76 12) (Z12 31)   11  Esophageal reflux (530 81) (K21 9)   12  Foot pain, bilateral (729 5) (M79 671,M79 672)   13  Hiatal hernia (553 3) (K44 9)   14  History of Carrero's esophagus (V12 79) (Z87 19)   15  History of fall (V15 88) (Z91 81)   16  Hypertension (401 9) (I10)   17  Leg pain, left (729 5) (M79 605)   18  Lichen sclerosus et atrophicus (701 0) (L90 0)   19  Lumbar radiculopathy (724 4) (M54 16)   20  Multiple lung nodules (793 19) (R91 8)   21  Obesity (278 00) (E66 9)   22  Onychomycosis (110 1) (B35 1)   23  Osteopenia (733 90) (M85 80)   24  Pacemaker (V45 01) (Z95 0)   25  Peripheral neuropathy (356 9) (G62 9)   26  Pes planus of both feet (734) (M21 41,M21 42)   27  Plantar fasciitis of left foot (728 71) (M72 2)   28  Restless legs syndrome (333 94) (G25 81)    Past Medical History  1   History of Abnormal glucose (790 29) (R73 09)   2  Atrial fibrillation (427 31) (I48 91)   3  History of Breast Cancer (V10 3)   4  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)   5  Esophageal reflux (530 81) (K21 9)   6  Denied: History of Exposure To STD   7  History of Gross hematuria (599 71) (R31 0)   8  History of Hematoma (924 9) (T14 8XXA)   9  History of Hematoma of lower extremity, right, initial encounter (924 5) (S80 11XA)   10  History of backache (V13 59) (Z87 39)   11  History of Carrero's esophagus (V12 79) (Z87 19)   12  History of cataract (V12 49) (Z86 69)   13  History of chest pain (V13 89) (Z87 898)   14  History of fall (V15 88) (Z91 81)   15  History of hematuria (V13 09) (Z87 448)   16  History of postmenopausal hormone replacement therapy (V87 49) (Z92 29)   17  History of shortness of breath (V13 89) (Z87 898)   18  History of urinary incontinence (V13 09) (Z87 898)   19  History of Neck pain (723 1) (M54 2)   20  Normal delivery (650) (O80,Z37 9)   21  History of Right knee pain (719 46) (M25 561)   22  History of Ringing In The Ears (Tinnitus) (388 30)   23  History of Skin rash (782 1) (R21)   24  History of Traumatic blister of right lower extremity, initial encounter (916 2) (S80 821A)   25  History of UTI (lower urinary tract infection) (599 0) (N39 0)  Active Problems And Past Medical History Reviewed: The active problems and past medical history were reviewed and updated today  Surgical History  1  Denied: History of Abnormal Pap Smear Of Cervix   2  History of Breast Surgery Lumpectomy   3  History of Cataract Surgery   4  History of Diagnostic Cystoscopy   5  History of Excision Lesion Of Meniscus Or Capsule Knee   6  History of Pacemaker - Pulse Generator Replacement   7  History of Pacemaker Placement   8  History of Pacemaker Placement   9  History of Radiation Therapy   10  History of Total Abdominal Hysterectomy With Removal Of Both Ovaries  Surgical History Reviewed:    The surgical history was reviewed and updated today  Family History  Mother    1  Denied: Family history of Alcoholism and drug addiction in family   2  Denied: Family history of Anxiety and depression  Father    3  Denied: Family history of Alcoholism and drug addiction in family   4  Denied: Family history of Anxiety and depression   5  Family history of Coronary Artery Disease (V17 49)   6  Family history of abdominal aortic aneurysm (V17 49) (Z82 49)  Child    7  Denied: Family history of Alcoholism and drug addiction in family   6  Denied: Family history of Anxiety and depression  Sibling    9  Denied: Family history of Alcoholism and drug addiction in family   8  Denied: Family history of Anxiety and depression  Sister    6  Family history of Breast Cancer (V16 3)  Maternal Aunt    12  Family history of Colon Cancer (V16 0)   13  Family history of Colon Cancer (V16 0)  Family History    14  Denied: Family history of Diabetes Mellitus   15  Denied: Family history of Stroke Syndrome  Family History Reviewed: The family history was reviewed and updated today  Social History   · Being A Social Drinker   · Denied: History of Drug Use   · Former smoker (V15 82) (E21 731)   · Marital History - Currently    · Retired From Work  Social History Reviewed: The social history was reviewed and updated today  Current Meds   1  Clobetasol Propionate 0 05 % External Ointment; Apply to affected area twice weekly; Therapy: 80GIS1809 to (Last ON:29ILW9643)  Requested for: 25Oct2017 Ordered   2  Gabapentin 800 MG Oral Tablet; TAKE 1 TABLET 4 TIMES DAILY; Therapy: 45SWX7051 to Recorded   3  Lisinopril 40 MG Oral Tablet; TAKE ONE TABLET BY MOUTH EVERY DAY AS DIRECTED; Therapy: 21GXA6533 to (Evaluate:22Mar2018)  Requested for: 27Mar2017; Last   Rx:27Mar2017 Ordered   4  Metoprolol Tartrate 50 MG Oral Tablet; TAKE 1 TABLET TWICE DAILY  Requested for:   92IYU0817; Last Rx:27Mar2017 Ordered   5   Multivitamins CAPS; TAKE 1 CAPSULE DAILY; Therapy: (Recorded:11Mar2014) to Recorded   6  Pantoprazole Sodium 20 MG Oral Tablet Delayed Release; TAKE 1 TABLET DAILY; Therapy: 91FKR9604 to (Evaluate:21Fja0679)  Requested for: 24WOJ3987; Last   Rx:27Mar2017 Ordered   7  Pramipexole Dihydrochloride 0 5 MG Oral Tablet; TAKE 1 5 TABLETS Bedtime; Therapy: 13XOT6644 to (Last Rx:65Uwd6878) Ordered   8  Probiotic Oral Packet; Therapy: 49YFK1878 to Recorded   9  Savella 50 MG Oral Tablet; Therapy: 10WTD1946 to Recorded   10  Savella Titration Pack 12 5 & 25 & 50 MG Oral Miscellaneous; as directed; Therapy: 43LDY4920 to (Evaluate:10Oct2017)  Requested for: 67FBL9682; Last    Rx:43Ijz6673 Ordered   11  Warfarin Sodium 5 MG Oral Tablet; take 1 to 1 1/2 tabs by mouth daily or sa directed; Therapy: 22XUP0758 to 0479 89 05 16)  Requested for: 46CWA6632; Last    Rx:28Mar2017 Ordered  Medication List Reviewed: The medication list was reviewed and updated today  Allergies  1  duloxetine   2  LevoFLOXacin TABS   3  Cephalexin CAPS   4  Erythromycin Base TABS   5  Keflex TABS   6  Penicillins   7  Sulfa Drugs    Vitals  Vital Signs    Recorded: 02CAK7663 09:56AM   Heart Rate 96   Systolic 094, LUE, Sitting   Diastolic 82, LUE, Sitting   BP CUFF SIZE Large   Height 5 ft 4 in   Weight 233 lb 8 0 oz   BMI Calculated 40 08   BSA Calculated 2 09   O2 Saturation 97     Physical Exam    Constitutional   General appearance: No acute distress, well appearing and well nourished  Eyes   Conjunctiva and lids: No swelling, erythema or discharge  Pupils and irises: Equal, round and reactive to light  Ears, Nose, Mouth, and Throat   External inspection of ears and nose: Normal     Oropharynx: Normal with no erythema, edema, exudate or lesions  Pulmonary   Respiratory effort: No increased work of breathing or signs of respiratory distress  Auscultation of lungs: Clear to auscultation      Cardiovascular   Auscultation of heart: Normal rate and rhythm, normal S1 and S2, without murmurs  Examination of extremities for edema and/or varicosities: Normal     Abdomen   Abdomen: Non-tender, no masses  Liver and spleen: No hepatomegaly or splenomegaly  Lymphatic   Palpation of lymph nodes in neck: No lymphadenopathy  Musculoskeletal   Gait and station: Normal     Skin   Skin and subcutaneous tissue: Normal without rashes or lesions  Neurologic   Cranial nerves: Cranial nerves 2-12 intact  Psychiatric   Orientation to person, place, and time: Normal          Health Management  History of Benign colon polyp   COLONOSCOPY (GI, SURG); every 3 years; Last 95ALT5880; Next Due: 16Nxf1571; Active    Future Appointments    Date/Time Provider Specialty Site   12/07/2017 03:20 PM LAUREL Perez  Cardiology [address]   01/24/2018 02:00 PM Cardiology, Device Remote   [address]   04/25/2018 08:30 AM Cardiology, Device Remote   [address]   03/12/2018 11:00 AM Conchita Gupta MD Internal Medicine [facility] INTERNAL MED   12/05/2017 01:45 PM Bravo Blandon DPM Podiatry [facility]   01/23/2018 01:00 PM Elkin Zhong MD Gastroenterology Adult [address] [facility]   02/07/2018 01:45 PM LAUREL Haro   Neurology NEUROLOGY [facility]   Electronically signed by : Anastasia Jacobsen MD; Oct 31 2017  6:08PM EST                       (Author)

## 2017-11-17 ENCOUNTER — APPOINTMENT (OUTPATIENT)
Dept: LAB | Facility: HOSPITAL | Age: 77
End: 2017-11-17
Payer: MEDICARE

## 2017-11-17 ENCOUNTER — TRANSCRIBE ORDERS (OUTPATIENT)
Dept: ADMINISTRATIVE | Facility: HOSPITAL | Age: 77
End: 2017-11-17

## 2017-11-17 DIAGNOSIS — I48.0 PAROXYSMAL ATRIAL FIBRILLATION (HCC): ICD-10-CM

## 2017-11-17 LAB
INR PPP: 2.44 (ref 0.86–1.16)
PROTHROMBIN TIME: 25.8 SECONDS (ref 9.4–11.7)

## 2017-11-17 PROCEDURE — 36415 COLL VENOUS BLD VENIPUNCTURE: CPT

## 2017-11-17 PROCEDURE — 85610 PROTHROMBIN TIME: CPT

## 2017-11-20 ENCOUNTER — GENERIC CONVERSION - ENCOUNTER (OUTPATIENT)
Dept: OTHER | Facility: OTHER | Age: 77
End: 2017-11-20

## 2017-11-29 DIAGNOSIS — I48.0 PAROXYSMAL ATRIAL FIBRILLATION (HCC): ICD-10-CM

## 2017-12-01 DIAGNOSIS — M85.80 OTHER SPECIFIED DISORDERS OF BONE DENSITY AND STRUCTURE, UNSPECIFIED SITE: ICD-10-CM

## 2017-12-01 DIAGNOSIS — I48.0 PAROXYSMAL ATRIAL FIBRILLATION (HCC): ICD-10-CM

## 2017-12-04 ENCOUNTER — HOSPITAL ENCOUNTER (OUTPATIENT)
Dept: RADIOLOGY | Age: 77
Discharge: HOME | End: 2017-12-04
Payer: MEDICARE

## 2017-12-04 DIAGNOSIS — Z91.89 OTHER SPECIFIED PERSONAL RISK FACTORS, NOT ELSEWHERE CLASSIFIED: ICD-10-CM

## 2017-12-04 DIAGNOSIS — Z12.31 ENCOUNTER FOR SCREENING MAMMOGRAM FOR MALIGNANT NEOPLASM OF BREAST: ICD-10-CM

## 2017-12-04 PROCEDURE — G0202 SCR MAMMO BI INCL CAD: HCPCS

## 2017-12-04 PROCEDURE — 77080 DXA BONE DENSITY AXIAL: CPT

## 2017-12-04 PROCEDURE — 77063 BREAST TOMOSYNTHESIS BI: CPT

## 2017-12-05 ENCOUNTER — ALLSCRIPTS OFFICE VISIT (OUTPATIENT)
Dept: OTHER | Facility: OTHER | Age: 77
End: 2017-12-05

## 2017-12-06 NOTE — PROGRESS NOTES
Assessment  1  Acquired ankle/foot deformity (736 70) (M21 969)   2  Foot pain, bilateral (729 5) (M79 671,M79 672)   3  Lumbar radiculopathy (724 4) (M54 16)   4  Difficulty walking (719 7) (R26 2)   5  Onychomycosis (110 1) (B35 1)    Plan     · Savella 50 MG Oral Tablet; one tab po bid   · Follow-up visit in 9 weeks Evaluation and Treatment  Follow-up  Status: Hold For -Scheduling  Requested for: 82JCY2776   · Inspect your feet and legs daily if you have vascular disease ; Status:Complete;   Done:98Ypl0435 02:01PM   · There are many things you can do at home to ensure good foot health ; Status:Complete;  Done: 61TWI9690 02:01PM   · Wear shoes that give your toes plenty of room ; Status:Complete;   Done: 40Xuf360636:01PM    Discussion/Summary  The patient was counseled regarding instructions for management,-- prognosis,-- patient and family education,-- risks and benefits of treatment options,-- importance of compliance with treatment  Patient is able to Self-Care  Possible side effects of new medications were reviewed with the patient/guardian today  The treatment plan was reviewed with the patient/guardian  The patient/guardian understands and agrees with the treatment plan      Chief Complaint  Routine nail care      History of Present Illness  HPI: Patient is doing well with Cymbalta however she began have facial tingling  She discontinued medicine  However the medication was relieving her neuropathic pain  Review of Systems    ROS reviewed  Active Problems    1  Achilles tendinitis of left lower extremity (726 71) (M76 62)   2  Acquired ankle/foot deformity (736 70) (M21 969)   3  AF (paroxysmal atrial fibrillation) (427 31) (I48 0)   4  Anticoagulant long-term use (V58 61) (Z79 01)   5  Arthritis (716 90) (M19 90)   6  At risk for bone density loss (V49 89) (Z91 89)   7  Atrial fibrillation (427 31) (I48 91)   8  History of Breast Cancer (V10 3)   9  Difficulty walking (719 7) (R26 2)   10  Encounter for screening mammogram for breast cancer (V76 12) (Z12 31)   11  Esophageal reflux (530 81) (K21 9)   12  Foot pain, bilateral (729 5) (M79 671,M79 672)   13  Hiatal hernia (553 3) (K44 9)   14  History of Carrero's esophagus (V12 79) (Z87 19)   15  History of fall (V15 88) (Z91 81)   16  Hypertension (401 9) (I10)   17  Leg pain, left (729 5) (M79 605)   18  Lichen sclerosus et atrophicus (701 0) (L90 0)   19  Lumbar radiculopathy (724 4) (M54 16)   20  Multiple lung nodules (793 19) (R91 8)   21  Obesity (278 00) (E66 9)   22  Onychomycosis (110 1) (B35 1)   23  Osteopenia (733 90) (M85 80)   24  Pacemaker (V45 01) (Z95 0)   25  Peripheral neuropathy (356 9) (G62 9)   26  Pes planus of both feet (734) (M21 41,M21 42)   27  Plantar fasciitis of left foot (728 71) (M72 2)   28  Restless legs syndrome (333 94) (G25 81)    Past Medical History   · History of Abnormal glucose (790 29) (R73 09)   · Atrial fibrillation (427 31) (I48 91)   · History of Breast Cancer (V10 3)   · 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)   · Esophageal reflux (530 81) (K21 9)   · Denied: History of Exposure To STD   · History of Gross hematuria (599 71) (R31 0)   · History of Hematoma (924 9) (T14 8XXA)   · History of Hematoma of lower extremity, right, initial encounter (924 5) (S80 11XA)   · History of backache (V13 59) (Z87 39)   · History of Carrero's esophagus (V12 79) (Z87 19)   · History of cataract (V12 49) (Z86 69)   · History of chest pain (V13 89) (U76 979)   · History of fall (V15 88) (Z91 81)   · History of hematuria (V13 09) (Z87 448)   · History of postmenopausal hormone replacement therapy (V87 49) (Z92 29)   · History of shortness of breath (V13 89) (E62 397)   · History of urinary incontinence (V13 09) (Z87 898)   · History of Neck pain (723 1) (M54 2)   · Normal delivery (650) (O80,Z37  9)   · History of Right knee pain (719 46) (M25 561)   · History of Ringing In The Ears (Tinnitus) (388 30)   · History of Skin rash (782 1) (R21)   · History of Traumatic blister of right lower extremity, initial encounter (916 2) (E28 608W)   · History of UTI (lower urinary tract infection) (599 0) (N39 0)    The active problems and past medical history were reviewed and updated today  Surgical History   · Denied: History of Abnormal Pap Smear Of Cervix   · History of Breast Surgery Lumpectomy   · History of Cataract Surgery   · History of Diagnostic Cystoscopy   · History of Excision Lesion Of Meniscus Or Capsule Knee   · History of Pacemaker - Pulse Generator Replacement   · History of Pacemaker Placement   · History of Pacemaker Placement   · History of Radiation Therapy   · History of Total Abdominal Hysterectomy With Removal Of Both Ovaries    The surgical history was reviewed and updated today  Family History  Mother    · Denied: Family history of Alcoholism and drug addiction in family   · Denied: Family history of Anxiety and depression  Father    · Denied: Family history of Alcoholism and drug addiction in family   · Denied: Family history of Anxiety and depression   · Family history of Coronary Artery Disease (V17 49)   · Family history of abdominal aortic aneurysm (V17 49) (Z82 49)  Child    · Denied: Family history of Alcoholism and drug addiction in family   · Denied: Family history of Anxiety and depression  Sibling    · Denied: Family history of Alcoholism and drug addiction in family   · Denied: Family history of Anxiety and depression  Sister    · Family history of Breast Cancer (V16 3)  Maternal Aunt    · Family history of Colon Cancer (V16 0)   · Family history of Colon Cancer (V16 0)  Family History    · Denied: Family history of Diabetes Mellitus   · Denied: Family history of Stroke Syndrome    The family history was reviewed and updated today         Social History     · Being A Social Drinker   · Denied: History of Drug Use   · Former smoker (V15 82) (T09 961)   · 25 pack years, quit age 41   · Marital History - Currently    · Retired From Work   · 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  The social history was reviewed and updated today  Current Meds   1  Clobetasol Propionate 0 05 % External Ointment; Apply to affected area twice weekly; Therapy: 22WQU7166 to (Last GO:11SPD3488)  Requested for: 25Oct2017 Ordered   2  Gabapentin 800 MG Oral Tablet; TAKE 1 TABLET 4 TIMES DAILY; Therapy: 25BDV6826 to Recorded   3  Lisinopril 40 MG Oral Tablet; TAKE ONE TABLET BY MOUTH EVERY DAY AS DIRECTED; Therapy: 94MDA6531 to (Evaluate:22Mar2018)  Requested for: 27Mar2017; Last Rx:27Mar2017 Ordered   4  Metoprolol Tartrate 50 MG Oral Tablet; TAKE 1 TABLET TWICE DAILY  Requested for: 79SLA3938; Last Rx:27Mar2017 Ordered   5  Multivitamins CAPS; TAKE 1 CAPSULE DAILY; Therapy: (Recorded:11Mar2014) to Recorded   6  Pramipexole Dihydrochloride 0 5 MG Oral Tablet; TAKE 1 5 TABLETS Bedtime; Therapy: 23BRD5510 to (Last Rx:02Tld9507) Ordered   7  Probiotic Oral Packet; Therapy: 16YOU9913 to Recorded   8  RaNITidine HCl - 150 MG Oral Tablet; TAKE 1 TABLET AT BEDTIME; Therapy: 30KFC0415 to [id number] [id number])  Requested for: 85MLN4725; Last Rx:30Oct2017 Ordered   9  Savella 50 MG Oral Tablet; TAKE ONE TAB TWICE DAILY; Therapy: 49QWT0509 to (Last Rx:20Nov2017)  Requested for: 20Nov2017 Ordered   10  Savella 50 MG Oral Tablet; Therapy: 50GSX7898 to Recorded   11  Savella Titration Pack 12 5 & 25 & 50 MG Oral Miscellaneous; as directed; Therapy: 49SGW5723 to (Evaluate:10Oct2017)  Requested for: 52MUB0196; Last  Rx:43Xsa2664 Ordered   12  Warfarin Sodium 5 MG Oral Tablet; take 1 to 1 1/2 tabs by mouth daily or sa directed; Therapy: 09JSW3635 to Radha Sequin)  Requested for: 90VDI6286; Last  Rx:28Mar2017 Ordered    The medication list was reviewed and updated today  Allergies  1  duloxetine   2  LevoFLOXacin TABS   3  Cephalexin CAPS   4  Erythromycin Base TABS   5  Keflex TABS   6  Penicillins   7   Sulfa Drugs    Vitals   Recorded: 29EUM8684 01:46PM   Heart Rate 78   Respiration 16   Systolic 472   Diastolic 81   Height 5 ft 4 in   Weight 233 lb    BMI Calculated 39 99   BSA Calculated 2 09     Physical Exam  Left Foot: Appearance: Normal except as noted: excessive pronation-- and-- pes planus  Tenderness: None except the lisfranc joint-- and-- medial longitudinal arch  ROM: subtalar motion was restricted  Right Foot: Appearance: Normal except as noted: excessive pronation-- and-- pes planus  Tenderness: None except the lisfranc joint-- and-- medial longitudinal arch  ROM: subtalar motion was restricted   Left Ankle: ROM: limited ROM in all planes   Right Ankle: ROM: limited ROM in all planes   Neurological Exam: performed  Light touch was decreased bilaterally  Vibratory sensation was decreased in both first metatarsophalangeal joints  Deep tendon reflexes: achilles reflex absent bilateraly-- and-- 4/5 L5 testing bilateral    Vascular Exam: performed Dorsalis pedis pulses were diminished bilaterally  Posterior tibial pulses were diminished bilaterally  Dependence rubor was present bilaterally  Capillary refill time was Negative digital hair noted, but-- between 1-3 seconds bilaterally  Toenails: All of the toenails were elongated,-- hypertrophied,-- discolored-- and--   Hyperkeratosis: present on both first toes  Shoe Gear Evaluation: performed ()  Recommendation(s): SAS style  Procedure  Nails debrided  Bilateral preulcerative lesions debrided as well  Procedure without pain or complication  Refill of Savella ordered        Future Appointments    Date/Time Provider Specialty Site   01/24/2018 02:00 PM Cardiology, Device Remote   [address]   04/25/2018 08:30 AM Cardiology, Device Remote   [address]   03/12/2018 11:00 AM Manjit Gupta MD Internal Medicine [facility] INTERNAL MED   01/23/2018 01:00 PM Francisca Ba MD Gastroenterology Adult [facility] GASTROENTEROLOGY [place]   02/07/2018 01:45 PM LAUREL House   Neurology NEUROLOGY [facility]   Electronically signed by : Ramirez Ferrer DPM; Dec  5 2017  2:02PM EST                       (Author)

## 2017-12-07 ENCOUNTER — GENERIC CONVERSION - ENCOUNTER (OUTPATIENT)
Dept: OTHER | Facility: OTHER | Age: 77
End: 2017-12-07

## 2017-12-14 ENCOUNTER — HOSPITAL ENCOUNTER (EMERGENCY)
Facility: HOSPITAL | Age: 77
Discharge: HOME | End: 2017-12-14
Attending: EMERGENCY MEDICINE | Admitting: EMERGENCY MEDICINE
Payer: MEDICARE

## 2017-12-14 VITALS
RESPIRATION RATE: 18 BRPM | BODY MASS INDEX: 40.51 KG/M2 | OXYGEN SATURATION: 97 % | SYSTOLIC BLOOD PRESSURE: 150 MMHG | DIASTOLIC BLOOD PRESSURE: 87 MMHG | HEART RATE: 88 BPM | WEIGHT: 236 LBS | TEMPERATURE: 97.4 F

## 2017-12-14 DIAGNOSIS — S83.242A ACUTE MEDIAL MENISCUS TEAR OF LEFT KNEE, INITIAL ENCOUNTER: Primary | ICD-10-CM

## 2017-12-14 PROCEDURE — 99283 EMERGENCY DEPT VISIT LOW MDM: CPT

## 2017-12-14 RX ORDER — TRAMADOL HYDROCHLORIDE 50 MG/1
50 TABLET ORAL EVERY 6 HOURS PRN
Qty: 15 TABLET | Refills: 0 | Status: SHIPPED | OUTPATIENT
Start: 2017-12-14 | End: 2018-03-12 | Stop reason: ALTCHOICE

## 2017-12-14 NOTE — DISCHARGE INSTRUCTIONS
Meniscus Tear   WHAT YOU NEED TO KNOW:   A meniscus tear is a tear in the cartilage of your knee  The meniscus is a piece of cartilage (strong tissue) between your thighbone and shinbone  The meniscus helps to cushion your knee joint and keep it stable  DISCHARGE INSTRUCTIONS:   Call 911 for any of the following:   · Your arm or leg feels warm, tender, and painful  It may look swollen and red  Return to the emergency department if:   · You cannot move your knee at all  Contact your healthcare provider if:   · Your symptoms do not improve with treatment  · You have questions or concerns about your condition or care  Medicines:   · NSAIDs , such as ibuprofen, help decrease swelling, pain, and fever  This medicine is available with or without a doctor's order  NSAIDs can cause stomach bleeding or kidney problems in certain people  If you take blood thinner medicine, always ask if NSAIDs are safe for you  Always read the medicine label and follow directions  Do not give these medicines to children under 10months of age without direction from your child's healthcare provider  · Take your medicine as directed  Contact your healthcare provider if you think your medicine is not helping or if you have side effects  Tell him of her if you are allergic to any medicine  Keep a list of the medicines, vitamins, and herbs you take  Include the amounts, and when and why you take them  Bring the list or the pill bottles to follow-up visits  Carry your medicine list with you in case of an emergency  Follow up with your healthcare provider as directed:  Write down your questions so you remember to ask them during your visits  Self-care:   · Rest  your knee  Avoid activities that make the swelling or pain worse  You may need to avoid putting weight on your leg while you have pain  Your healthcare provider may recommend that you use crutches      · Apply ice  on your knee for 15 to 20 minutes every hour or as directed  Use an ice pack, or put crushed ice in a plastic bag  Cover it with a towel  Ice helps prevent tissue damage and decreases swelling and pain  · Compress  your knee with an elastic bandage, air cast, medical boot, or splint to reduce swelling  Ask your healthcare provider which compression device to use, and how tight it should be  · Elevate  your knee above the level of your heart as often as you can  This will help decrease swelling and pain  Prop your knee on pillows or blankets to keep it elevated comfortably  © 2017 [facility] Information is for End User's use only and may not be sold, redistributed or otherwise used for commercial purposes  All illustrations and images included in CareNotes® are the copyrighted property of [employer]  or Patrick Coppola  The above information is an  only  It is not intended as medical advice for individual conditions or treatments  Talk to your doctor, nurse or pharmacist before following any medical regimen to see if it is safe and effective for you

## 2017-12-14 NOTE — ED PROVIDER NOTES
History  Chief Complaint   Patient presents with    Knee Pain     Pt ambulatory on unit c/o L knee pain for 2 weeks  Denies injury or additional symptoms  46-year-old female presents with chief complaint of left knee pain  Pain is over the medial aspect radiating around to the rear the knee  Onset was 2 weeks ago  Pain is exacerbated by getting up from a seated position  No significant swelling  Patient denies any recent trauma  Patient reports prior history of meniscal injury to this knee requiring surgery however that was on the lateral aspect and several years ago  No calf tenderness  No leg swelling  History provided by:  Patient   used: No    Knee Pain   Location:  Knee  Time since incident:  2 weeks  Injury: no    Knee location:  L knee  Pain details:     Quality:  Aching and sharp    Radiates to:  Does not radiate    Severity:  No pain    Timing:  Constant  Chronicity:  New  Dislocation: no    Foreign body present:  No foreign bodies  Prior injury to area:  No  Relieved by:  Rest  Worsened by:  Bearing weight  Ineffective treatments:  None tried  Associated symptoms: no fever    Risk factors: obesity    Risk factors: no concern for non-accidental trauma        Prior to Admission Medications   Prescriptions Last Dose Informant Patient Reported? Taking?    Calcium Acetate, Phos Binder, (CALCIUM ACETATE PO)   Yes No   Sig: Take by mouth   PRAMIPEXOLE DIHYDROCHLORIDE PO   Yes No   Sig: Take 5 mg by mouth 2 (two) times a day 1 1/2 tabs x 2 daily   clobetasol (TEMOVATE) 0 05 % cream   Yes No   Sig: Apply topically 2 (two) times a week   gabapentin (NEURONTIN) 800 mg tablet   Yes No   Sig: Take 800 mg by mouth 4 (four) times a day   lisinopril (ZESTRIL) 40 mg tablet   Yes No   Sig: Take 40 mg by mouth daily   metoprolol tartrate (LOPRESSOR) 50 mg tablet   Yes No   Sig: Take 50 mg by mouth 2 (two) times a day   multivitamin (THERAGRAN) TABS   Yes No   Sig: Take 1 tablet by mouth daily   pantoprazole (PROTONIX) 40 mg tablet   Yes No   Sig: Take 40 mg by mouth daily   warfarin (COUMADIN) 5 mg tablet   Yes No   Sig: Take 7 5 mg by mouth daily        Facility-Administered Medications: None       Past Medical History:   Diagnosis Date    Atrial fibrillation (HCC)     Cancer (HCC)     Left Breast, Lumpectomy    Esophageal reflux     Hiatal hernia     Hypertension     Irregular heart beat     AFIB    Neuropathy     Pacemaker     Paroxysmal atrial fibrillation (HCC)     Peripheral neuropathy     Rectal bleeding     Restless leg syndrome        Past Surgical History:   Procedure Laterality Date    BREAST SURGERY      CARDIAC PACEMAKER PLACEMENT      CATARACT EXTRACTION      HYSTERECTOMY      KNEE SURGERY      ID COLONOSCOPY FLX DX W/COLLJ SPEC WHEN PFRMD N/A 2/8/2017    Procedure: COLONOSCOPY;  Surgeon: Sai Farrar MD;  [facility]; Service: Gastroenterology    ID ESOPHAGOGASTRODUODENOSCOPY TRANSORAL DIAGNOSTIC N/A 9/20/2017    Procedure: ESOPHAGOGASTRODUODENOSCOPY (EGD); Surgeon: Pablo Hernandez MD;  Location: [facility]; Service: Gastroenterology       History reviewed  No pertinent family history  I have reviewed and agree with the history as documented  Social History   Substance Use Topics    Smoking status: Former Smoker     Quit date: 9/20/1980    Smokeless tobacco: Never Used      Comment: Quit over 30 years ago    Alcohol use Yes      Comment: rarely        Review of Systems   Constitutional: Negative for chills and fever  Respiratory: Negative for shortness of breath  Cardiovascular: Negative for leg swelling  Musculoskeletal: Positive for arthralgias (Left knee)  Negative for myalgias  Skin: Negative for wound  Neurological: Negative for weakness and numbness  All other systems reviewed and are negative        Physical Exam  ED Triage Vitals   Temperature Pulse Respirations Blood Pressure SpO2   12/14/17 1115 12/14/17 1112 12/14/17 1112 12/14/17 1112 12/14/17 1112   (!) 97 4 °F (36 3 °C) 87 18 166/86 97 %      Temp Source Heart Rate Source Patient Position - Orthostatic VS BP Location FiO2 (%)   12/14/17 1115 12/14/17 1112 12/14/17 1112 12/14/17 1112 --   Oral Monitor Lying Right arm       Pain Score       12/14/17 1112       Worst Possible Pain           Orthostatic Vital Signs  Vitals:    12/14/17 1112 12/14/17 1208   BP: 166/86 150/87   Pulse: 87 88   Patient Position - Orthostatic VS: Lying Sitting       Physical Exam   Constitutional: She is oriented to person, place, and time  She appears well-developed and well-nourished  She appears distressed (Mild)  HENT:   Head: Normocephalic and atraumatic  Eyes: EOM are normal  Pupils are equal, round, and reactive to light  Cardiovascular: Normal rate, regular rhythm, normal heart sounds and intact distal pulses  Exam reveals no friction rub  No murmur heard  Pulmonary/Chest: Effort normal and breath sounds normal  No respiratory distress  She has no wheezes  She has no rales  Musculoskeletal: She exhibits tenderness (Medial aspect of left knee)  She exhibits no edema or deformity  Positive for medial compartment tenderness with Ned testing of left knee  Neurological: She is alert and oriented to person, place, and time  Skin: Skin is warm and dry  Psychiatric: She has a normal mood and affect  Her behavior is normal  Judgment and thought content normal    Nursing note and vitals reviewed  ED Medications  Medications - No data to display    Diagnostic Studies  Results Reviewed     None                 No orders to display              Procedures  Procedures       Phone Contacts  ED Phone Contact    ED Course  ED Course                                MDM  Number of Diagnoses or Management Options  Acute medial meniscus tear of left knee, initial encounter: new and does not require workup  Diagnosis management comments:    The patient presents with signs symptoms indicative for medial meniscus injury  Will put patient in knee immobilizer and referred to Orthopedics for further evaluation  Will prescribe analgesia for home  Patient Progress  Patient progress: improved    CritCare Time    Disposition  Final diagnoses:   Acute medial meniscus tear of left knee, initial encounter     Time reflects when diagnosis was documented in both MDM as applicable and the Disposition within this note     Time User Action Codes Description Comment    12/14/2017 11:44 AM Luis Nieto [[id number]] Acute medial meniscus tear of left knee, initial encounter       ED Disposition     ED Disposition Condition Comment    Discharge  [facility] discharge to home/self care      Condition at discharge: Good        Follow-up Information     Follow up With Specialties Details Why Contact Info    Lester Villarreal MD Orthopedic Surgery Schedule an appointment as soon as possible for a visit in 1 week  [address] [place], [place] [address]  [phone]          Discharge Medication List as of 12/14/2017 11:45 AM      START taking these medications    Details   traMADol (ULTRAM) 50 mg tablet Take 1 tablet by mouth every 6 (six) hours as needed for moderate pain for up to 15 doses, Starting Thu 12/14/2017, Print         CONTINUE these medications which have NOT CHANGED    Details   Calcium Acetate, Phos Binder, (CALCIUM ACETATE PO) Take by mouth, Historical Med      clobetasol (TEMOVATE) 0 05 % cream Apply topically 2 (two) times a week, Historical Med      gabapentin (NEURONTIN) 800 mg tablet Take 800 mg by mouth 4 (four) times a day, Until Discontinued, Historical Med      lisinopril (ZESTRIL) 40 mg tablet Take 40 mg by mouth daily, Until Discontinued, Historical Med      metoprolol tartrate (LOPRESSOR) 50 mg tablet Take 50 mg by mouth 2 (two) times a day, Until Discontinued, Historical Med      multivitamin (THERAGRAN) TABS Take 1 tablet by mouth daily, Until Discontinued, Historical Med pantoprazole (PROTONIX) 40 mg tablet Take 40 mg by mouth daily, Until Discontinued, Historical Med      PRAMIPEXOLE DIHYDROCHLORIDE PO Take 5 mg by mouth 2 (two) times a day 1 1/2 tabs x 2 daily, Historical Med      warfarin (COUMADIN) 5 mg tablet Take 7 5 mg by mouth daily  , Until Discontinued, Historical Med           No discharge procedures on file      ED Provider  Electronically Signed by           Shahzad Morgan MD  12/14/17 9889

## 2017-12-14 NOTE — ED NOTES
D/C instructions discussed  Educated on f/u, medication, immobilizer use, and s/s of when to return to the ED  All questions answered  Ambulatory off unit with steady gait and no s/s of acute distress        Dorys Richardson RN  12/14/17 9841

## 2018-01-02 ENCOUNTER — TRANSCRIBE ORDERS (OUTPATIENT)
Dept: ADMINISTRATIVE | Facility: HOSPITAL | Age: 78
End: 2018-01-02

## 2018-01-02 ENCOUNTER — APPOINTMENT (OUTPATIENT)
Dept: LAB | Facility: HOSPITAL | Age: 78
End: 2018-01-02
Payer: MEDICARE

## 2018-01-02 ENCOUNTER — APPOINTMENT (OUTPATIENT)
Dept: RADIOLOGY | Facility: CLINIC | Age: 78
End: 2018-01-02
Payer: MEDICARE

## 2018-01-02 ENCOUNTER — ALLSCRIPTS OFFICE VISIT (OUTPATIENT)
Dept: OTHER | Facility: OTHER | Age: 78
End: 2018-01-02

## 2018-01-02 DIAGNOSIS — M25.562 PAIN IN LEFT KNEE: ICD-10-CM

## 2018-01-02 DIAGNOSIS — I48.0 PAROXYSMAL ATRIAL FIBRILLATION (HCC): ICD-10-CM

## 2018-01-02 LAB
INR PPP: 3.28 (ref 0.86–1.16)
PROTHROMBIN TIME: 34.9 SECONDS (ref 9.4–11.7)

## 2018-01-02 PROCEDURE — 36415 COLL VENOUS BLD VENIPUNCTURE: CPT

## 2018-01-02 PROCEDURE — 85610 PROTHROMBIN TIME: CPT

## 2018-01-02 PROCEDURE — 73562 X-RAY EXAM OF KNEE 3: CPT

## 2018-01-03 ENCOUNTER — GENERIC CONVERSION - ENCOUNTER (OUTPATIENT)
Dept: OTHER | Facility: OTHER | Age: 78
End: 2018-01-03

## 2018-01-03 DIAGNOSIS — M25.562 PAIN IN LEFT KNEE: ICD-10-CM

## 2018-01-03 DIAGNOSIS — I48.0 PAROXYSMAL ATRIAL FIBRILLATION (HCC): ICD-10-CM

## 2018-01-03 NOTE — PROGRESS NOTES
Assessment   1  Osteoarthritis of left knee (840 14) (M17 12)    Plan   Knee pain, left    · * XR KNEE 3 VW LEFT NON INJURY; Status:Active - Retrospective By Protocol    Authorization; Requested SO:[id number]; Louie Malone is presenting with signs and symptoms consistent with osteoarthritis of the left knee  We did discuss that arthritis will cause the occasional flare up  Louie Malone has arthritis of the right knee as well and had previously been treated with hyaluronic acid which did provider her relief  Fortunately Louie Malone is doing very well currently  Should her knee pain return she can call and request injections  I did provide her a home exercise program that she can perform on her own which will help maintain her current status  I also encouraged her to ice the knee should the knee become sore  Discussion/Summary   The patient, patient's family was counseled regarding diagnostic results,-- instructions for management,-- risk factor reductions,-- prognosis,-- patient and family education,-- impressions,-- risks and benefits of treatment options,-- importance of compliance with treatment  Chief Complaint   1  Knee Pain    History of Present Illness    Louie Malone is a 82-year-old female here today with her  for left knee pain  She states that 2 weeks ago she developed severe pain in the posterior aspect of the left knee that would radiate into the left ankle  She states she had a difficulty with walking and had a significant limp  Fortunately, Louie Malone states that her knee pain has improved significantly  She stated that she did consider cancelling her appointment due to her improvement  Currently she has no pain with rest    Louie Malone denies any recent falls or trauma to the knee and this pain did come upon her quite randomly  She has a history of meniscectomy performed in the right knee as well as arthritis    She states she has received Euflexxa injections in the past for the right knee which did provide her relief  Review of Systems        Constitutional: No fever, no chills, feels well, no tiredness, no recent weight gain or loss  Eyes: No complaints of eyesight problems, no red eyes  ENT: no loss of hearing, no nosebleeds, no sore throat  Cardiovascular: No complaints of chest pain, no palpitations, no leg claudication or lower extremity edema  Respiratory: no compliants of shortness of breath, no wheezing, no cough  Gastrointestinal: no complaints of abdominal pain, no constipation, no nausea or diarrhea, no vomiting, no bloody stools  Genitourinary: no complaints of dysuria, no incontinence  Musculoskeletal: as noted in HPI  Integumentary: no complaints of skin rash or lesion, no itching or dry skin, no skin wounds  Neurological: no complaints of headache, no confusion, no numbness or tingling, no dizziness  Endocrine: No complaints of muscle weakness, no feelings of weakness, no frequent urination, no excessive thirst       Psychiatric: No suicidal thoughts, no anxiety, no feelings of depression  ROS reviewed  Active Problems   1  Achilles tendinitis of left lower extremity (726 71) (M76 62)   2  Acquired ankle/foot deformity (736 70) (M21 969)   3  AF (paroxysmal atrial fibrillation) (427 31) (I48 0)   4  Anticoagulant long-term use (V58 61) (Z79 01)   5  Arthritis (716 90) (M19 90)   6  At risk for bone density loss (V49 89) (Z91 89)   7  Atrial fibrillation (427 31) (I48 91)   8  History of Breast Cancer (V10 3)   9  Dense breast tissue on mammogram (793 89) (R92 2)   10  Difficulty walking (719 7) (R26 2)   11  Encounter for screening mammogram for breast cancer (V76 12) (Z12 31)   12  Esophageal reflux (530 81) (K21 9)   13  Foot pain, bilateral (729 5) (M79 671,M79 672)   14  Hiatal hernia (553 3) (K44 9)   15  History of Carrero's esophagus (V12 79) (Z87 19)   16  History of fall (V15 88) (Z91 81)   17   Hypertension (401  9) (I10)   18  Knee pain, left (719 46) (M25 562)   19  Leg pain, left (729 5) (M79 605)   20  Lichen sclerosus et atrophicus (701 0) (L90 0)   21  Lumbar radiculopathy (724 4) (M54 16)   22  Multiple lung nodules (793 19) (R91 8)   23  Obesity (278 00) (E66 9)   24  Onychomycosis (110 1) (B35 1)   25  Osteopenia (733 90) (M85 80)   26  Pacemaker (V45 01) (Z95 0)   27  Peripheral neuropathy (356 9) (G62 9)   28  Pes planus of both feet (734) (M21 41,M21 42)   29  Plantar fasciitis of left foot (728 71) (M72 2)   30  Restless legs syndrome (333 94) (G25 81)    Past Medical History    · History of Abnormal glucose (790 29) (R73 09)   · Atrial fibrillation (427 31) (I48 91)   · History of Breast Cancer (V10 3)   · 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)   · Esophageal reflux (530 81) (K21 9)   · Denied: History of Exposure To STD   · History of Gross hematuria (599 71) (R31 0)   · History of Hematoma (924 9) (T14 8XXA)   · History of Hematoma of lower extremity, right, initial encounter (924 5) (S80 11XA)   · History of backache (V13 59) (Z87 39)   · History of Carrero's esophagus (V12 79) (Z87 19)   · History of cataract (V12 49) (Z86 69)   · History of chest pain (V13 89) (R95 303)   · History of fall (V15 88) (Z91 81)   · History of hematuria (V13 09) (Z87 448)   · History of postmenopausal hormone replacement therapy (V87 49) (Z92 29)   · History of shortness of breath (V13 89) (J09 139)   · History of urinary incontinence (V13 09) (Z87 898)   · History of Neck pain (723 1) (M54 2)   · Normal delivery (650) (O80,Z37  9)   · History of Right knee pain (719 46) (M25 561)   · History of Ringing In The Ears (Tinnitus) (388 30)   · History of Skin rash (782 1) (R21)   · History of Traumatic blister of right lower extremity, initial encounter (916 2) (U72 705F)   · History of UTI (lower urinary tract infection) (599 0) (N39 0)     The active problems and past medical history were reviewed and updated today  Surgical History    · Denied: History of Abnormal Pap Smear Of Cervix   · History of Breast Surgery Lumpectomy   · History of Cataract Surgery   · History of Diagnostic Cystoscopy   · History of Excision Lesion Of Meniscus Or Capsule Knee   · History of Pacemaker - Pulse Generator Replacement   · History of Pacemaker Placement   · History of Pacemaker Placement   · History of Radiation Therapy   · History of Total Abdominal Hysterectomy With Removal Of Both Ovaries     The surgical history was reviewed and updated today  Family History   Mother    · Denied: Family history of Alcoholism and drug addiction in family   · Denied: Family history of Anxiety and depression  Father    · Denied: Family history of Alcoholism and drug addiction in family   · Denied: Family history of Anxiety and depression   · Family history of Coronary Artery Disease (V17 49)   · Family history of abdominal aortic aneurysm (V17 49) (Z82 49)  Child    · Denied: Family history of Alcoholism and drug addiction in family   · Denied: Family history of Anxiety and depression  Sibling    · Denied: Family history of Alcoholism and drug addiction in family   · Denied: Family history of Anxiety and depression  Sister    · Family history of Breast Cancer (V16 3)  Maternal Aunt    · Family history of Colon Cancer (V16 0)   · Family history of Colon Cancer (V16 0)  Family History    · Denied: Family history of Diabetes Mellitus   · Denied: Family history of Stroke Syndrome     The family history was reviewed and updated today  Social History    · Being A Social Drinker   · Denied: History of Drug Use   · Former smoker (V15 82) (W70 322)   · 25 pack years, quit age 41   · Marital History - Currently    · Retired From Work   · 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  The social history was reviewed and updated today  The social history was reviewed and is unchanged  Current Meds    1   Clobetasol Propionate 0 05 % External Ointment; APPLY TO AFFECTED AREA(S)      TWICE WEEKLY; Therapy: 29UIU4882 to (Evaluate:18Mar2018)  Requested for: 04YHE3131; Last     Rx:03Lja4375 Ordered   2  Gabapentin 800 MG Oral Tablet; TAKE 1 TABLET 4 TIMES DAILY; Therapy: 00WMF7577 to Recorded   3  Lisinopril 40 MG Oral Tablet; TAKE ONE TABLET BY MOUTH EVERY DAY AS     DIRECTED; Therapy: 81HMQ0589 to (Evaluate:22Mar2018)  Requested for: 27Mar2017; Last     Rx:27Mar2017 Ordered   4  Metoprolol Tartrate 50 MG Oral Tablet; Take 1 tablet by mouth  twice a day; Therapy: 81ARC6232 to (Evaluate:13Dec2018)  Requested for: 86CJQ9275; Last     Rx:18Dec2017 Ordered   5  Multivitamins CAPS; TAKE 1 CAPSULE DAILY; Therapy: (Recorded:11Mar2014) to Recorded   6  Pramipexole Dihydrochloride 0 5 MG Oral Tablet; TAKE 1 5 TABLETS Bedtime; Therapy: 69KAA3359 to (Last Rx:68Heq9193) Ordered   7  Probiotic Oral Packet; Therapy: 48XFE0919 to Recorded   8  RaNITidine HCl - 150 MG Oral Tablet; TAKE 1 TABLET AT BEDTIME; Therapy: 97DVR8065 to [id number])  Requested for: 60JLZ2229; Last     Rx:30Oct2017 Ordered   9  Savella 50 MG Oral Tablet; one tab po bid; Therapy: 31KIH9777 to (Evaluate:05Mar2018); Last Rx:98Iex5936 Ordered   10  Savella 50 MG Oral Tablet; TAKE ONE TAB TWICE DAILY; Therapy: 39CCX4622 to (Last Rx:20Nov2017)  Requested for: 20Nov2017 Ordered   11  Savella 50 MG Oral Tablet; Therapy: 68VBN0022 to Recorded   12  Savella Titration Pack 12 5 & 25 & 50 MG Oral Miscellaneous; as directed; Therapy: 58FSN5970 to (Evaluate:10Oct2017)  Requested for: 57QJF6072; Last      Rx:84Swm6741 Ordered   13  Warfarin Sodium 5 MG Oral Tablet; Take 1 5 to 2 tabs daily or as ordered by physician; Therapy: 45DBC6320 to (Evaluate:78Yqc1651)  Requested for: 59IDB2385; Last      Rx:39Kpf2825 Ordered     The medication list was reviewed and updated today  Allergies   1  duloxetine   2  LevoFLOXacin TABS   3  Cephalexin CAPS   4  Erythromycin Base TABS   5  Keflex TABS   6  Penicillins   7  Sulfa Drugs    Vitals   Signs   Systolic: 329  Diastolic: 80  Height: 5 ft 4 in  Weight: 230 lb   BMI Calculated: 39 48  BSA Calculated: 2 08    Physical Exam      Left Knee: Appearance: Normal genu varum-- and-- Varus posture  Tenderness: None except the medial joint line  ROM: Full  Motor: Normal  Flexion was 5/5  Extension was 5/5  Special Test: positive laxity on Valgus Stress (1), but-- negative Anterior Drawer sign,-- negative Posterior Drawer sign-- and-- no laxity on Varus Stress  Constitutional - General appearance: Normal       Musculoskeletal - Gait and station: Normal -- Digits and nails: Normal -- Muscle strength/tone: Normal       Cardiovascular - Pulses: Normal -- Examination of extremities for edema and/or varicosities: Normal       Skin - Skin and subcutaneous tissue: Normal       Neurologic - Sensation: Normal       Psychiatric - Orientation to person, place, and time: Normal -- Mood and affect: Normal       Eyes      Conjunctiva and lids: Normal        Pupils and irises: Normal        Results/Data   I personally reviewed the films/images/results in the office today  My interpretation follows  X-ray Review X-rays of the left knee demonstrate mild medial compartment arthritis as well as mild patellofemoral arthritis  Attending Note   Scribe Attestation:      Scribe Attestation Cinthya Nagy am acting as a scribe in the presence of the attending physician while the attending physician examines the patient  Physician Attestation:      Jr Wade MD personally performed the services described in this documentation as scribed in my presence, and it is both accurate and complete  Future Appointments      Date/Time Provider Specialty Site   01/23/2018 03:20 PM LAUREL dHez   Cardiology [address]   01/24/2018 02:00 PM Cardiology, [address] [place] [address] 04/25/2018 08:30 AM Cardiology, Device Remote   [address]   03/12/2018 11:00 AM eLvi Gupta MD Internal Medicine [facility] INTERNAL MED   02/13/2018 02:45 PM Erinn Christine DPM Podiatry [facility]   01/23/2018 01:00 PM Elisha Vasquez MD Gastroenterology Adult [facility]   02/07/2018 01:45 PM LAUREL Gregory   Neurology NEUROLOGY [facility]    Electronically signed by : CRISTIANE Melendrez; Jan 2 2018 10:26AM EST                       (Author)     Electronically signed by : LAUREL Joseph ; Jan 2 2018 12:32PM EST                       (Author)

## 2018-01-09 NOTE — RESULT NOTES
Verified Results  (1) PT WITH INR [id number] 04:51PM Lisandro Meng Order Number: [id number]     Test Name Result Flag Reference   INR 2 29 H 0 86-1 16   PT 25 4 seconds H 9 4-11 7

## 2018-01-10 NOTE — RESULT NOTES
Verified Results  (1) PT WITH INR 03Oct2016 09:45AM Bert Jennings     Test Name Result Flag Reference   INR 2 91 H 0 86-1 16   PT 31 6 seconds H 9 4-11 7

## 2018-01-10 NOTE — RESULT NOTES
Verified Results  (1) PT WITH INR [id number] 01:00PM John Brewster    Order Number: [id number]     Test Name Result Flag Reference   INR 2 50 H 0 86-1 16   Performing Comments: PT/ INR DONE WEEKLY OR AS DIRECTED BY PHYSICIAN   START 3/1/17 TO 3/1/18   PT 27 0 seconds H 9 4-11 7

## 2018-01-10 NOTE — PROGRESS NOTES
REPORT NAME: Patient Visit Summary Report   VISIT DATE: 3/22/2016  VISIT TIME: 9:41 AM EDT  PATIENT NAME: Leeanne Saldana  MEDICAL RECORD NUMBER: [id number]  SOCIAL SECURITY NUMBER:   YOB: 1940  AGE: 70  REFERRING PHYSICIAN: Dede Ulloa  SUPERVISING CLINICIAN: [address] [facility] CARE PROFESSIONAL: Renan Edmonds  PATIENT HOME ADDRESS: [address] PHONE: [phone]  DIAGNOSIS 1: Unspecified atrial fibrillation / I48 91  DIAGNOSIS 2:   DIAGNOSIS 3:   DIAGNOSIS 4:   INR RANGE: 2 - 3  INR GOAL: 2 5  TREATMENT START DATE:   TREATMENT END DATE:   NEXT VISIT: 4/19/2016  [facility]    VISIT RESULTS   ENCOUNTER NUMBER:   TEST LOCATION: [facility]  TEST TYPE:   VISIT TYPE:   CURRENT INR: 2 29 PROTIME:   SPECIMEN COL AND RPT DATE: 3/22/2016 9:41 AM  EDT    VITAL SIGNS  PULSE:  B/P:  WEIGHT:  HEIGHT:  TEMP:     CURRENT ANTICOAGULANT DOSING SCHEDULE  DOSE SIZE: 5mg    ANTICOAGULANT TYPE: WARFARIN  DOSING REGIMEN  Sun       Mon       Tues      Wed       Thurs     Fri       Sat  Total/Wk  7 5       7 5       10        7 5       10        7 5       7 5       57 5    PATIENT MEDICATION INSTRUCTION: Yes  PATIENT NUTRITIONAL COUNSELING: Yes  PATIENT BRUISING INSTRUCTION: Yes      LAST EDUCATION DATE:       PREVIOUS VISIT INFORMATION  VISITDATE   INR Goal  INR   Sun     Mon     Tues    Wed     Thurs   Fri  Sat     Total/wk  3/22/2016   2 5       2 29  7 5     7 5     10      7 5     10      7 5  7 5     57 5  2/12/2016   2 5       2 48  7 5     7 5     10      7 5     10      7 5  7 5     57 5  12/30/2015  2 5       2 52  7 5     7 5     10      7 5     10      7 5  7 5     57 5  12/2/2015   2 5       2 83  7 5     7 5     10      7 5     10      7 5  7 5     57 5    ADDITIONAL PREVIOUS VISIT INFORMATION  VISITDATE   PRIMARY RX               DOSE      CrCl  3/22/2016   WARFARIN                 5mg                 2/12/2016   WARFARIN 5mg                 12/30/2015  WARFARIN                 5mg                 12/2/2015   WARFARIN                 5mg                     OTHER CURRENT MEDICATIONS: WARFARIN      PROGRESS NOTES: l/m for patient, no changes, 4 wks    PATIENT INSTRUCTIONS:     TEST LOCATION: [facility]    Electronically signed by: Bea Hernandez on 3/22/2016 at 9:41 AM EDT

## 2018-01-10 NOTE — MISCELLANEOUS
Message  GI Reminder Recall Roby Hyde:   Date: 05/18/2017   Dear Arianna Moyer:     Review of our records shows you are due for the following: Follow Up Visit  Please call the following office to schedule your appointment:   [address] [facility], [address] [place] [address], [address] [place] [address] [phone]  We look forward to hearing from you!      Sincerely,     [facility]      Signatures   Electronically signed by : Helen Anders, ; May 18 2017  2:40PM EST                       (Author)

## 2018-01-11 NOTE — RESULT NOTES
Verified Results  (1) PT WITH INR 02Jan2017 03:44PM John Brewster    Order Number: [id number]     Test Name Result Flag Reference   INR 1 27 H 0 86-1 16   - Patient Instructions: INR WEEKLY OR AS ORDERED BY PHYSICIAN, ORDERS FOR MARCH 21, 2016 TO MARCH 21, 2017   PT 13 4 seconds H 9 4-11 7

## 2018-01-11 NOTE — RESULT NOTES
Verified Results  (1) PT WITH INR [id number] 02:27PM Erick Sorin Order Number: [id number]     Test Name Result Flag Reference   INR 2 30 H 0 86-1 16   PT 24 8 seconds H 9 4-11 7

## 2018-01-12 VITALS
WEIGHT: 234 LBS | BODY MASS INDEX: 39.95 KG/M2 | OXYGEN SATURATION: 97 % | RESPIRATION RATE: 18 BRPM | HEART RATE: 92 BPM | HEIGHT: 64 IN | DIASTOLIC BLOOD PRESSURE: 80 MMHG | SYSTOLIC BLOOD PRESSURE: 136 MMHG

## 2018-01-12 VITALS
DIASTOLIC BLOOD PRESSURE: 62 MMHG | HEART RATE: 88 BPM | SYSTOLIC BLOOD PRESSURE: 130 MMHG | HEIGHT: 64 IN | BODY MASS INDEX: 40.54 KG/M2 | WEIGHT: 237.44 LBS

## 2018-01-12 NOTE — RESULT NOTES
Verified Results  (1) PT WITH INR 03Apr2017 03:50PM Asael Ernst FOLEY Order Number: [id number]     Test Name Result Flag Reference   INR 2 29 H 0 86-1 16   Performing Comments: PT/ INR DONE WEEKLY OR AS DIRECTED BY PHYSICIAN   START 3/1/17 TO 3/1/18   PT 24 7 seconds H 9 4-11 7

## 2018-01-12 NOTE — PROGRESS NOTES
REPORT NAME: Progress Notes Report  VISIT DATE: 10/4/2017  VISIT TIME: 3:10 PM EDT  PATIENT NAME: Aleksandar Martinez  MEDICAL RECORD NUMBER: [id number]  YOB: 1940  AGE: 78  REFERRING PHYSICIAN: Delia Brambila  SUPERVISING CLINICIAN: [address] CARE PROVIDER: Bren Mendoza   PATIENT HOME ADDRESS: [address] PHONE: [phone]  SOCIAL SECURITY NUMBER:   DIAGNOSIS 1: Unspecified atrial fibrillation / I48 91  DIAGNOSIS 2:   INR RANGE: 2 - 3  INR GOAL: 2 5  TREATMENT START DATE:   TREATMENT END DATE:   NEXT VISIT: 10/18/2017  [facility]  VISIT RESULTS  ENCOUNTER NUMBER:   TEST LOCATION: [facility]  TEST TYPE:   VISIT TYPE:   CURRENT INR: 1 72 PROTIME:   SPECIMEN COL AND RPT DATE: 10/4/2017 3:10 PM  EDT  VITAL SIGNS  PULSE:  BP: / WEIGHT:  HEIGHT:  TEMP:   CURRENT ANTICOAGULANT DOSING SCHEDULE  DOSE SIZE: 5mg    ANTICOAGULANT TYPE: WARFARIN  DOSING REGIMEN  Sun       Mon       Tues      Wed       Thurs     Fri       Sat  Total/Wk  10        7 5       7 5       7 5       10        7 5       7 5       57 5  PATIENT MEDICATION INSTRUCTION: Yes  PATIENT NUTRITIONAL COUNSELING: Yes  PATIENT BRUISING INSTRUCTION: Yes  LAST EDUCATION DATE:   PREVIOUS VISIT INFORMATION  VISITDATE  INRGoal INR   Sun    Mon    Tues   Wed    Thurs  Fri    Sat  Total/wk  10/4/2017   2 5     1 72  10     7 5    7 5    7 5    10     7 5    7 5  57 5  9/8/2017    2 5     2 6   7 5    7 5    7 5    7 5    10     7 5    7 5  55  8/25/2017   2 5     3 51  7 5    7 5    10     7 5    10     7 5    7 5  57 5  7/14/2017   2 5     2 9   7 5    7 5    10     7 5    10     7 5    7 5  57 5  ADDITIONAL PREVIOUS VISIT INFORMATION  VISITDATE   PRIMARY RX               DOSE      CrCl  10/4/2017   WARFARIN                 5mg  9/8/2017    WARFARIN                 5mg  8/25/2017   WARFARIN                 5mg  7/14/2017   WARFARIN                 5mg  OTHER CURRENT MEDICATIONS:  WARFARIN  PROGRESS NOTES: l/m w changes / 2 wks  PATIENT INSTRUCTIONS:   TEST LOCATION: [facility], , ,   INBOUND LAB DATA:  Lab       Lab Value Col Date                 Rpt Date                 Lab  Reference Range  Electronically signed Maria Esther Eduardo  on 10/4/2017 3:10 PM EDT

## 2018-01-12 NOTE — RESULT NOTES
Verified Results  (1) PT WITH INR [id number] 11:02AM Esme Lewis Order Number: [id number]     Test Name Result Flag Reference   INR 2 44 H 0 86-1 16   PT 25 8 seconds H 9 4-11 7
Statement Selected

## 2018-01-13 NOTE — PROGRESS NOTES
REPORT NAME: Patient Visit Summary Report   VISIT DATE: 5/6/2016  VISIT TIME: 3:01 PM EDT  PATIENT NAME: Janee Macile  MEDICAL RECORD NUMBER: [id number]  SOCIAL SECURITY NUMBER:   YOB: 1940  AGE: 70  REFERRING PHYSICIAN: ANNY HERNANDEZ  SUPERVISING CLINICIAN: [address] [facility] CARE PROFESSIONAL: [address]   PATIENT HOME ADDRESS: [address], [address] [place] [address] PHONE: [phone]  DIAGNOSIS 1: Unspecified atrial fibrillation / I48 91  DIAGNOSIS 2:   DIAGNOSIS 3:   DIAGNOSIS 4:   INR RANGE: 2 - 3  INR GOAL: 2 5  TREATMENT START DATE:   TREATMENT END DATE:   NEXT VISIT: 6/3/2016  [facility]    VISIT RESULTS   ENCOUNTER NUMBER:   TEST LOCATION: [facility]  TEST TYPE:   VISIT TYPE:   CURRENT INR: 2 23 PROTIME:   SPECIMEN COL AND RPT DATE: 5/6/2016 3:01 PM  EDT    VITAL SIGNS  PULSE:  B/P:  WEIGHT:  HEIGHT:  TEMP:     CURRENT ANTICOAGULANT DOSING SCHEDULE  DOSE SIZE: 5mg    ANTICOAGULANT TYPE: WARFARIN  DOSING REGIMEN  Sun       Mon       Tues      Wed       Thurs     Fri       Sat  Total/Wk  7 5       7 5       10        7 5       10        7 5       7 5       57 5    PATIENT MEDICATION INSTRUCTION: Yes  PATIENT NUTRITIONAL COUNSELING: Yes  PATIENT BRUISING INSTRUCTION: Yes      LAST EDUCATION DATE:       PREVIOUS VISIT INFORMATION  VISITDATE   INR Goal  INR   Sun     Mon     Tues    Wed     Thurs   Fri  Sat     Total/wk  5/6/2016    2 5       2 23  7 5     7 5     10      7 5     10      7 5  7 5     57 5  4/6/2016    2 5       2     7 5     7 5     10      7 5     10      7 5  7 5     57 5  3/22/2016   2 5       2 29  7 5     7 5     10      7 5     10      7 5  7 5     57 5  2/12/2016   2 5       2 48  7 5     7 5     10      7 5     10      7 5  7 5     57 5    ADDITIONAL PREVIOUS VISIT INFORMATION  VISITDATE   PRIMARY RX               DOSE      CrCl  5/6/2016    WARFARIN                 5mg                 4/6/2016    WARFARIN                 5mg 3/22/2016   WARFARIN                 5mg                 2/12/2016   WARFARIN                 5mg                     OTHER CURRENT MEDICATIONS: WARFARIN      PROGRESS NOTES: spoke to pt / no changes 4 wks    PATIENT INSTRUCTIONS:     TEST LOCATION: [facility]    Electronically signed Vale Anna  on 5/6/2016 at 3:01 PM EDT

## 2018-01-13 NOTE — RESULT NOTES
Verified Results  (1) PT WITH INR 12Jun2017 03:01PM [facility] Order Number: [id number]     Test Name Result Flag Reference   INR 2 30 H 0 86-1 16   PT 24 8 seconds H 9 4-11 7

## 2018-01-13 NOTE — RESULT NOTES
Verified Results  (1) PT WITH INR [id number] 11:05AM Eber Cervantes     Test Name Result Flag Reference   INR 2 98 H 0 86-1 16   PT 31 6 seconds H 9 4-11 7

## 2018-01-13 NOTE — PROGRESS NOTES
REPORT NAME: Progress Notes Report  VISIT DATE: 6/12/2017  VISIT TIME: 4:27 PM EDT  PATIENT NAME: Nancy Nunez  MEDICAL RECORD NUMBER: [id number]  YOB: 1940  AGE: 78  REFERRING PHYSICIAN: Kirk Hatch  SUPERVISING CLINICIAN: [address] CARE PROVIDER: [facility]   PATIENT HOME ADDRESS: [address] PHONE: [phone]  SOCIAL SECURITY NUMBER:   DIAGNOSIS 1: Unspecified atrial fibrillation / I48 91  DIAGNOSIS 2:   INR RANGE: 2 - 3  INR GOAL: 2 5  TREATMENT START DATE:   TREATMENT END DATE:   NEXT VISIT: 7/10/2017  [facility]  VISIT RESULTS  ENCOUNTER NUMBER:   TEST LOCATION: [facility]  TEST TYPE:   VISIT TYPE:   CURRENT INR: 2 3 PROTIME:   SPECIMEN COL AND RPT DATE: 6/12/2017 4:27 PM  EDT  VITAL SIGNS  PULSE:  BP: / WEIGHT:  HEIGHT:  TEMP:   CURRENT ANTICOAGULANT DOSING SCHEDULE  DOSE SIZE: 5mg    ANTICOAGULANT TYPE: WARFARIN  DOSING REGIMEN  Sun       Mon       Tues      Wed       Thurs     Fri       Sat  Total/Wk  7 5       7 5       10        7 5       10        7 5       7 5       57 5  PATIENT MEDICATION INSTRUCTION: Yes  PATIENT NUTRITIONAL COUNSELING: Yes  PATIENT BRUISING INSTRUCTION: Yes  LAST EDUCATION DATE:   PREVIOUS VISIT INFORMATION  VISITDATE  INRGoal INR   Sun    Mon    Tues   Wed    Thurs  Fri    Sat  Total/wk  6/12/2017   2 5     2 3   7 5    7 5    10     7 5    10     7 5    7 5  57 5  5/17/2017   2 5     2 3   7 5    7 5    10     7 5    10     7 5    7 5  57 5  4/4/2017    2 5     2 2   7 5    7 5    10     7 5    10     7 5    7 5  57 5  3/30/2017   2 5     2 5   7 5    7 5    10     7 5    10     7 5    7 5  57 5  ADDITIONAL PREVIOUS VISIT INFORMATION  VISITDATE   PRIMARY RX               DOSE      CrCl  6/12/2017   WARFARIN                 5mg  5/17/2017   WARFARIN                 5mg  4/4/2017    WARFARIN                 5mg  3/30/2017   WARFARIN                 5mg  OTHER CURRENT MEDICATIONS:  WARFARIN  PROGRESS NOTES: l/m no changes / 4 wks  PATIENT INSTRUCTIONS:   TEST LOCATION: [facility], , ,   INBOUND LAB DATA:  Lab       Lab Value Col Date                 Rpt Date                 Lab  Reference Range  Electronically signed Vale Anna  on 6/12/2017 4:27 PM EDT

## 2018-01-13 NOTE — PROGRESS NOTES
REPORT NAME: Patient Visit Summary Report   VISIT DATE: 4/6/2016  VISIT TIME: 2:48 PM EDT  PATIENT NAME: Baron Santana  MEDICAL RECORD NUMBER: [id number]  SOCIAL SECURITY NUMBER:   YOB: 1940  AGE: 70  REFERRING PHYSICIAN: ANNY HERNANDEZ  SUPERVISING CLINICIAN: ANNY HERNANDEZ  HEALTH CARE PROFESSIONAL: [address]   PATIENT HOME ADDRESS: [address], [address] [place] [address] PHONE: [phone]  DIAGNOSIS 1: Unspecified atrial fibrillation / I48 91  DIAGNOSIS 2:   DIAGNOSIS 3:   DIAGNOSIS 4:   INR RANGE: 2 - 3  INR GOAL: 2 5  TREATMENT START DATE:   TREATMENT END DATE:   NEXT VISIT: 5/3/2016  [facility]    VISIT RESULTS   ENCOUNTER NUMBER:   TEST LOCATION: [facility]  TEST TYPE:   VISIT TYPE:   CURRENT INR: 2 PROTIME:   SPECIMEN COL AND RPT DATE: 4/6/2016 2:48 PM EDT    VITAL SIGNS  PULSE:  B/P:  WEIGHT:  HEIGHT:  TEMP:     CURRENT ANTICOAGULANT DOSING SCHEDULE  DOSE SIZE: 5mg    ANTICOAGULANT TYPE: WARFARIN  DOSING REGIMEN  Sun       Mon       Tues      Wed       Thurs     Fri       Sat  Total/Wk  7 5       7 5       10        7 5       10        7 5       7 5       57 5    PATIENT MEDICATION INSTRUCTION: Yes  PATIENT NUTRITIONAL COUNSELING: Yes  PATIENT BRUISING INSTRUCTION: Yes      LAST EDUCATION DATE:       PREVIOUS VISIT INFORMATION  VISITDATE   INR Goal  INR   Sun     Mon     Tues    Wed     Thurs   Fri  Sat     Total/wk  4/6/2016    2 5       2     7 5     7 5     10      7 5     10      7 5  7 5     57 5  3/22/2016   2 5       2 29  7 5     7 5     10      7 5     10      7 5  7 5     57 5  2/12/2016   2 5       2 48  7 5     7 5     10      7 5     10      7 5  7 5     57 5  12/30/2015  2 5       2 52  7 5     7 5     10      7 5     10      7 5  7 5     57 5    ADDITIONAL PREVIOUS VISIT INFORMATION  VISITDATE   PRIMARY RX               DOSE      CrCl  4/6/2016    WARFARIN                 5mg                 3/22/2016   WARFARIN                 5mg 2/12/2016   WARFARIN                 5mg                 12/30/2015  WARFARIN                 5mg                     OTHER CURRENT MEDICATIONS: WARFARIN      PROGRESS NOTES: spoke to pt / no changes / pt was on antibiotic for 10 days  / 4 wks    PATIENT INSTRUCTIONS:     TEST LOCATION: [facility]    Electronically signed bySusu Eduardo  on 4/6/2016 at 2:48 PM EDT

## 2018-01-13 NOTE — RESULT NOTES
Verified Results  (1) PT WITH INR 19Oct2017 03:28PM Alek Cloud     Test Name Result Flag Reference   INR 2 83 H 0 86-1 16   PT 30 0 seconds H 9 4-11 7

## 2018-01-13 NOTE — PROGRESS NOTES
REPORT NAME: Patient Visit Summary Report   VISIT DATE: 1/19/2017  VISIT TIME: 2:13 PM EST  PATIENT NAME: Julia Gee  MEDICAL RECORD NUMBER: [id number]  SOCIAL SECURITY NUMBER:   YOB: 1940  AGE: 78  REFERRING PHYSICIAN: Juana Zhong  SUPERVISING CLINICIAN: ANNY HERNANDEZ  HEALTH CARE PROFESSIONAL: Lali Pérez   PATIENT HOME ADDRESS: [address] PHONE: [phone]  DIAGNOSIS 1: Unspecified atrial fibrillation / I48 91  DIAGNOSIS 2:   DIAGNOSIS 3:   DIAGNOSIS 4:   INR RANGE: 2 - 3  INR GOAL: 2 5  TREATMENT START DATE:   TREATMENT END DATE:   NEXT VISIT: 2/16/2017  [facility]    VISIT RESULTS   ENCOUNTER NUMBER:   TEST LOCATION: [facility]  TEST TYPE:   VISIT TYPE:   CURRENT INR: 2 22 PROTIME:   SPECIMEN COL AND RPT DATE: 1/19/2017 2:13 PM  EST    VITAL SIGNS  PULSE:  B/P:  WEIGHT:  HEIGHT:  TEMP:     CURRENT ANTICOAGULANT DOSING SCHEDULE  DOSE SIZE: 5mg    ANTICOAGULANT TYPE: WARFARIN  DOSING REGIMEN  Sun       Mon       Tues      Wed       Thurs     Fri       Sat  Total/Wk  7 5       7 5       10        7 5       10        7 5       7 5       57 5    PATIENT MEDICATION INSTRUCTION: Yes  PATIENT NUTRITIONAL COUNSELING: Yes  PATIENT BRUISING INSTRUCTION: Yes      LAST EDUCATION DATE:       PREVIOUS VISIT INFORMATION  VISITDATE   INR Goal  INR   Sun     Mon     Tues    Wed     Thurs   Fri  Sat     Total/wk  1/19/2017   2 5       2 22  7 5     7 5     10      7 5     10      7 5  7 5     57 5  12/16/2016  2 5       2 24  7 5     7 5     10      7 5     10      7 5  7 5     57 5  11/2/2016   2 5       2 9   7 5     7 5     10      7 5     10      7 5  7 5     57 5  10/3/2016   2 5       2 91  7 5     7 5     10      7 5     10      7 5  7 5     57 5    ADDITIONAL PREVIOUS VISIT INFORMATION  VISITDATE   PRIMARY RX               DOSE      CrCl  1/19/2017   WARFARIN                 5mg                 12/16/2016  WARFARIN 5mg                 11/2/2016   WARFARIN                 5mg                 10/3/2016   WARFARIN                 5mg                     OTHER CURRENT MEDICATIONS: WARFARIN      PROGRESS NOTES: l/m no changes / 4 wks    PATIENT INSTRUCTIONS:     TEST LOCATION: [facility]    Electronically signed byKanwal Rausch  on 1/19/2017 at 2:13 PM EST

## 2018-01-13 NOTE — RESULT NOTES
Verified Results  (1) PT WITH INR [id number] 01:08PM [facility]     Test Name Result Flag Reference   INR 2 22 H 0 86-1 16   PT 23 9 seconds H 9 4-11 7     (1) CBC/ PLT (NO DIFF) [id number] 01:08PM [facility]     Test Name Result Flag Reference   HEMATOCRIT 41 8 %  37 0-47 0   HEMOGLOBIN 13 6 g/dL  12 0-16 0   MCHC 32 5 g/dL  31 4-37 4   MCH 28 9 pg  27 0-31 0   MCV 89 fL  82-98   PLATELET COUNT 638 Thousands/uL  130-400   RBC COUNT 4 71 Million/uL  4 20-5  40   RDW 15 3 % H 11 6-15 1   WBC COUNT 7 90 Thousand/uL  4 80-10 80   MPV 9 2 fL  8 9-12 7     (1) BASIC METABOLIC PROFILE [id number] 01:08PM [facility]audio Villarreal     Test Name Result Flag Reference   GLUCOSE,RANDM 89 mg/dL     If the patient is fasting, the ADA then defines impaired fasting glucose as > 100 mg/dL and diabetes as > or equal to 123 mg/dL  SODIUM 141 mmol/L  136-145   POTASSIUM 4 5 mmol/L  3 5-5 3   CHLORIDE 103 mmol/L  100-108   CARBON DIOXIDE 31 mmol/L  21-32   ANION GAP (CALC) 7 mmol/L  4-13   BLOOD UREA NITROGEN 20 mg/dL  5-25   CREATININE 0 76 mg/dL  0 60-1 30   Standardized to IDMS reference method   CALCIUM 9 0 mg/dL  8 3-10 1   eGFR Non-African American      >60 0 ml/min/1 73sq [facility] Disease Education Program recommendations are as follows:  GFR calculation is accurate only with a steady state creatinine  Chronic Kidney disease less than 60 ml/min/1 73 sq  meters  Kidney failure less than 15 ml/min/1 73 sq  meters

## 2018-01-14 VITALS
RESPIRATION RATE: 17 BRPM | BODY MASS INDEX: 40.97 KG/M2 | HEART RATE: 74 BPM | HEIGHT: 64 IN | DIASTOLIC BLOOD PRESSURE: 90 MMHG | WEIGHT: 240 LBS | SYSTOLIC BLOOD PRESSURE: 136 MMHG

## 2018-01-14 VITALS
HEIGHT: 64 IN | DIASTOLIC BLOOD PRESSURE: 84 MMHG | WEIGHT: 240.38 LBS | BODY MASS INDEX: 41.04 KG/M2 | SYSTOLIC BLOOD PRESSURE: 129 MMHG | OXYGEN SATURATION: 96 % | HEART RATE: 88 BPM | TEMPERATURE: 97.6 F

## 2018-01-14 VITALS
SYSTOLIC BLOOD PRESSURE: 134 MMHG | TEMPERATURE: 97.4 F | RESPIRATION RATE: 18 BRPM | HEIGHT: 64 IN | HEART RATE: 90 BPM | OXYGEN SATURATION: 95 % | WEIGHT: 238.5 LBS | BODY MASS INDEX: 40.72 KG/M2 | DIASTOLIC BLOOD PRESSURE: 84 MMHG

## 2018-01-14 VITALS
HEART RATE: 96 BPM | DIASTOLIC BLOOD PRESSURE: 82 MMHG | OXYGEN SATURATION: 97 % | HEIGHT: 64 IN | WEIGHT: 233.5 LBS | BODY MASS INDEX: 39.86 KG/M2 | SYSTOLIC BLOOD PRESSURE: 130 MMHG

## 2018-01-14 VITALS
BODY MASS INDEX: 41.23 KG/M2 | WEIGHT: 241.5 LBS | SYSTOLIC BLOOD PRESSURE: 140 MMHG | OXYGEN SATURATION: 97 % | TEMPERATURE: 97.7 F | HEART RATE: 84 BPM | RESPIRATION RATE: 18 BRPM | HEIGHT: 64 IN | DIASTOLIC BLOOD PRESSURE: 64 MMHG

## 2018-01-14 VITALS
BODY MASS INDEX: 41.48 KG/M2 | DIASTOLIC BLOOD PRESSURE: 84 MMHG | HEART RATE: 90 BPM | SYSTOLIC BLOOD PRESSURE: 132 MMHG | HEIGHT: 64 IN | OXYGEN SATURATION: 97 % | RESPIRATION RATE: 16 BRPM | WEIGHT: 243 LBS | TEMPERATURE: 97.6 F

## 2018-01-14 NOTE — RESULT NOTES
Verified Results  (1) PT WITH INR 02Jun2016 03:16PM Denzel Adams Order Number: [id number]     Test Name Result Flag Reference   INR 2 56 H 0 86-1 16   PT 28 5 seconds H 9 4-11 7

## 2018-01-14 NOTE — PROGRESS NOTES
REPORT NAME: Patient Visit Summary Report   VISIT DATE: 2/12/2016  VISIT TIME: 8:46 AM EST  PATIENT NAME: Tavon Crystal  MEDICAL RECORD NUMBER: [id number]  SOCIAL SECURITY NUMBER:   YOB: 1940  AGE: 70  REFERRING PHYSICIAN: ANNY HERNANDEZ  SUPERVISING CLINICIAN: ANNY HERNANDEZ  HEALTH CARE PROFESSIONAL: Mitch Gonzalez   PATIENT HOME ADDRESS: [address] PHONE: [phone]  DIAGNOSIS 1: Unspecified atrial fibrillation / I48 91  DIAGNOSIS 2:   DIAGNOSIS 3:   DIAGNOSIS 4:   INR RANGE: 2 - 3  INR GOAL: 2 5  TREATMENT START DATE:   TREATMENT END DATE:   NEXT VISIT: 3/10/2016  [facility]    VISIT RESULTS   ENCOUNTER NUMBER:   TEST LOCATION: [facility]  TEST TYPE:   VISIT TYPE:   CURRENT INR: 2 48 PROTIME:   SPECIMEN COL AND RPT DATE: 2/12/2016 8:46 AM  EST    VITAL SIGNS  PULSE:  B/P:  WEIGHT:  HEIGHT:  TEMP:     CURRENT ANTICOAGULANT DOSING SCHEDULE  DOSE SIZE: 5mg    ANTICOAGULANT TYPE: WARFARIN  DOSING REGIMEN  Sun       Mon       Tues      Wed       Thurs     Fri       Sat  Total/Wk  7 5       7 5       10        7 5       10        7 5       7 5       57 5    PATIENT MEDICATION INSTRUCTION: Yes  PATIENT NUTRITIONAL COUNSELING: Yes  PATIENT BRUISING INSTRUCTION: Yes      LAST EDUCATION DATE:       PREVIOUS VISIT INFORMATION  VISITDATE   INR Goal  INR   Sun     Mon     Tues    Wed     Thurs   Fri  Sat     Total/wk  2/12/2016   2 5       2 48  7 5     7 5     10      7 5     10      7 5  7 5     57 5  12/30/2015  2 5       2 52  7 5     7 5     10      7 5     10      7 5  7 5     57 5  12/2/2015   2 5       2 83  7 5     7 5     10      7 5     10      7 5  7 5     57 5  10/23/2015  2 5       2 1   7 5     7 5     10      7 5     10      7 5  7 5     57 5    ADDITIONAL PREVIOUS VISIT INFORMATION  VISITDATE   PRIMARY RX               DOSE      CrCl  2/12/2016   WARFARIN                 5mg                 12/30/2015  WARFARIN 5mg                 12/2/2015   WARFARIN                 5mg                 10/23/2015  WARFARIN                 5mg                     OTHER CURRENT MEDICATIONS: WARFARIN      PROGRESS NOTES: spoke to pt / no changes / 4 wks    PATIENT INSTRUCTIONS:     TEST LOCATION: [facility]    Electronically signed Sunshine Oconenll  on 2/12/2016 at 8:46 AM EST

## 2018-01-14 NOTE — RESULT NOTES
Verified Results  (1) PT WITH INR [id number] 03:14PM Patrick Land Order Number: [id number]     Test Name Result Flag Reference   INR 2 00 H 0 86-1 16   PT 22 0 seconds H 9 4-11 7

## 2018-01-14 NOTE — PROGRESS NOTES
REPORT NAME: Patient Visit Summary Report   VISIT DATE: 10/3/2016  VISIT TIME: 10:25 AM EDT  PATIENT NAME: Sebastian Barclay 44 RECORD NUMBER: [id number]  SOCIAL SECURITY NUMBER:   YOB: 1940  AGE: 70  REFERRING PHYSICIAN: ANNY HERNANDEZ  SUPERVISING CLINICIAN: [facility] CARE PROFESSIONAL: Lulu Laird   PATIENT HOME ADDRESS: [address], [address] [place] [address] PHONE: [phone]  DIAGNOSIS 1: Unspecified atrial fibrillation / I48 91  DIAGNOSIS 2:   DIAGNOSIS 3:   DIAGNOSIS 4:   INR RANGE: 2 - 3  INR GOAL: 2 5  TREATMENT START DATE:   TREATMENT END DATE:   NEXT VISIT: 10/31/2016  [facility]    VISIT RESULTS   ENCOUNTER NUMBER:   TEST LOCATION: [facility]  TEST TYPE:   VISIT TYPE:   CURRENT INR: 2 91 PROTIME:   SPECIMEN COL AND RPT DATE: 10/3/2016 10:25 AM  EDT    VITAL SIGNS  PULSE:  B/P:  WEIGHT:  HEIGHT:  TEMP:     CURRENT ANTICOAGULANT DOSING SCHEDULE  DOSE SIZE: 5mg    ANTICOAGULANT TYPE: WARFARIN  DOSING REGIMEN  Sun       Mon       Tues      Wed       Thurs     Fri       Sat  Total/Wk  7 5       7 5       10        7 5       10        7 5       7 5       57 5    PATIENT MEDICATION INSTRUCTION: Yes  PATIENT NUTRITIONAL COUNSELING: Yes  PATIENT BRUISING INSTRUCTION: Yes      LAST EDUCATION DATE:       PREVIOUS VISIT INFORMATION  VISITDATE   INR Goal  INR   Sun     Mon     Tues    Wed     Thurs   Fri  Sat     Total/wk  10/3/2016   2 5       2 91  7 5     7 5     10      7 5     10      7 5  7 5     57 5  9/6/2016    2 5       2 13  7 5     7 5     10      7 5     10      7 5  7 5     57 5  8/2/2016    2 5       2 36  7 5     7 5     10      7 5     10      7 5  7 5     57 5  7/7/2016    2 5       2 2   7 5     7 5     10      7 5     10      7 5  7 5     57 5    ADDITIONAL PREVIOUS VISIT INFORMATION  VISITDATE   PRIMARY RX               DOSE      CrCl  10/3/2016   WARFARIN                 5mg                 9/6/2016    WARFARIN 5mg                 8/2/2016    WARFARIN                 5mg                 7/7/2016    WARFARIN                 5mg                     OTHER CURRENT MEDICATIONS: WARFARIN      PROGRESS NOTES: l/m no changes / 4 wks    PATIENT INSTRUCTIONS:     TEST LOCATION: [facility]    Electronically signed bySusu Eduardo  on 10/3/2016 at 10:25 AM EDT

## 2018-01-14 NOTE — PROGRESS NOTES
REPORT NAME: Progress Notes Report  VISIT DATE: 8/25/2017  VISIT TIME: 2:31 PM EDT  PATIENT NAME: Romeo Escamilla  MEDICAL RECORD NUMBER: [id number]  YOB: 1940  AGE: 78  REFERRING PHYSICIAN: Issac Casanova  SUPERVISING CLINICIAN: [address] CARE PROVIDER: [facility]  PATIENT HOME ADDRESS: [address] [facility] PHONE: [phone]  SOCIAL SECURITY NUMBER:   DIAGNOSIS 1: Unspecified atrial fibrillation / I48 91  DIAGNOSIS 2:   INR RANGE: 2 - 3  INR GOAL: 2 5  TREATMENT START DATE:   TREATMENT END DATE:   NEXT VISIT: 9/8/2017  [facility]  VISIT RESULTS  ENCOUNTER NUMBER:   TEST LOCATION: [facility]  TEST TYPE:   VISIT TYPE:   CURRENT INR: 3 51 PROTIME:   SPECIMEN COL AND RPT DATE: 8/25/2017 2:31 PM  EDT  VITAL SIGNS  PULSE:  BP: / WEIGHT:  HEIGHT:  TEMP:   CURRENT ANTICOAGULANT DOSING SCHEDULE  DOSE SIZE: 5mg    ANTICOAGULANT TYPE: WARFARIN  DOSING REGIMEN  Sun       Mon       Tues      Wed       Thurs     Fri       Sat  Total/Wk  7 5       7 5       10        7 5       10        7 5       7 5       57 5  PATIENT MEDICATION INSTRUCTION: Yes  PATIENT NUTRITIONAL COUNSELING: No  PATIENT BRUISING INSTRUCTION: No  LAST EDUCATION DATE:   PREVIOUS VISIT INFORMATION  VISITDATE  INRGoal INR   Sun    Mon    Tues   Wed    Thurs  Fri    Sat  Total/wk  8/25/2017   2 5     3 51  7 5    7 5    10     7 5    10     7 5    7 5  57 5  7/14/2017   2 5     2 9   7 5    7 5    10     7 5    10     7 5    7 5  57 5  6/12/2017   2 5     2 3   7 5    7 5    10     7 5    10     7 5    7 5  57 5  5/17/2017   2 5     2 3   7 5    7 5    10     7 5    10     7 5    7 5  57 5  ADDITIONAL PREVIOUS VISIT INFORMATION  VISITDATE   PRIMARY RX               DOSE      CrCl  8/25/2017   WARFARIN                 5mg  7/14/2017   WARFARIN                 5mg  6/12/2017   WARFARIN                 5mg  5/17/2017   WARFARIN                 5mg  OTHER CURRENT MEDICATIONS:  WARFARIN  PROGRESS NOTES: gave dosage to pt  retest inr in 2 wks  pt is taking  probiotics but all other medications are the same    PATIENT INSTRUCTIONS:   TEST LOCATION: [facility], , ,   INBOUND LAB DATA:  Lab       Lab Value Col Date                 Rpt Date                 Lab  Reference Range  Electronically signed byNelsy Ellis on 8/25/2017 2:31 PM EDT

## 2018-01-15 NOTE — RESULT NOTES
Verified Results  VAS LOWER LIMB VENOUS DUPLEX STUDY, UNILATERAL/LIMITED [id number] 09:39AM Tramaine Guajardo   TW Order Number: [id number]    - Patient Instructions: To schedule this appointment, please contact Central Scheduling at [phone]  TW Order Number: [id number]    - Patient Instructions: To schedule this appointment, please contact Central Scheduling at [phone]  Test Name Result Flag Reference   VAS LOWER LIMB VENOUS DUPLEX STUDY, UNILATERAL/LIMITED (Report)     [facility] REPORT   CLINICAL:   Indications: Left Limb Pain [M79 609]  x 2 weeks   Palpable lump on the lateral left calf  Bruised  Atrial fibrillation: Coumadin   Dyspnea on exertion   The patient has history of previous smoking (quit >10yrs ago)  She has no   history of DVT  The patient current BMI is 40 16, Weight is 234 lb and Height is   64 in  FINDINGS:      Segment Right      Left          Impression    Impression       CFV   Normal (Patent) Normal (Patent)             CONCLUSION:   Impression:   RIGHT LOWER LIMB LIMITED: NORMAL   Evaluation shows no evidence of thrombus in the common femoral vein  Doppler evaluation shows a normal response to augmentation maneuvers  LEFT LOWER LIMB: NORMAL   No evidence of acute or chronic deep vein thrombosis   No evidence of superficial thrombophlebitis noted  Doppler evaluation shows a normal response to distal augmentation maneuvers  Popliteal, posterior tibial and anterior tibial arterial Doppler waveforms are   triphasic  Lower extremity veins are pulsatile, suggesting either volume overload or   tricuspid insufficiency        SIGNATURE:   Electronically Signed by: Denia Feng MD on 2016-10-03 12:15:51 PM

## 2018-01-15 NOTE — RESULT NOTES
Verified Results  ECHO COMPLETE WITH CONTRAST IF INDICATED [id number] 11:15AM Beatrice Campbell Order Number: [id number]    - Patient Instructions: To schedule this appointment, please contact Central Scheduling at [phone]  Test Name Result Flag Reference   ECHO COMPLETE WITH CONTRAST IF INDICATED (Report)     [facility] [address]   [phone]     Transthoracic Echocardiogram   2D, M-mode, Doppler, and Color Doppler     Study date: 2017     Patient: Mj Toribio   MR number: [id number]   Account number: [de-identified]   : 1940   Age: 78 years   Gender: Female   Status: Outpatient   Location: [address] [place] [address]   Height: 64 in   Weight: 236 5 lb   BP: 130/ 62 mmHg     Indications: Assess left ventricular function  Assess left ventricular hypertrophy  Diagnoses: I10  - Essential (primary) hypertension     Sonographer: MORRIS Kimbrough   Primary Physician: Ar Musa MD   Referring Physician: Darcel Cranker, MD   Group: [facility]   Interpreting Physician: Mackenzie Gaspar MD     SUMMARY     LEFT VENTRICLE:   Systolic function was normal  Ejection fraction was estimated to be 65 %  Although no diagnostic regional wall motion abnormality was identified, this possibility cannot be completely excluded on the basis of this study  TRICUSPID VALVE:   There was moderate regurgitation  Estimated peak PA pressure was 35 - 40 mmHg  COMPARISONS:   The previous study was not available for direct comparison, however, there has been no significant change from the report of that study  Comparison was made with the previous study of 2016  HISTORY: PRIOR HISTORY: Essential hypertension, Atrial fibrillation, Permanent pacemaker     PROCEDURE: The study was performed in the [address] [place] [address]  This was a routine study  The transthoracic approach was used   The study included complete 2D imaging, M-mode, complete spectral Doppler, and color Doppler  The   heart rate was 90 bpm, at the start of the study  Images were obtained from the parasternal, apical, subcostal, and suprasternal notch acoustic windows  Image quality was adequate  LEFT VENTRICLE: Size was normal  Systolic function was normal  Ejection fraction was estimated to be 65 %  Although no diagnostic regional wall motion abnormality was identified, this possibility cannot be completely excluded on the basis   of this study  Wall thickness was normal  DOPPLER: Normal sinus rhythm was absent  RIGHT VENTRICLE: The size was normal  Systolic function was normal  A pacing wire was present  LEFT ATRIUM: The atrium was dilated  Not well visualized  ATRIAL SEPTUM: The septum bows from left to right, consistent with increased left atrial pressure  RIGHT ATRIUM: The atrium was dilated  Not well visualized  MITRAL VALVE: There was mild to moderate annular calcification  Valve structure was normal  There was mild-moderate thickening  There was normal leaflet separation  DOPPLER: There was no evidence for stenosis  There was trace   regurgitation  AORTIC VALVE: The valve was trileaflet  Leaflets exhibited moderately increased thickness, mild calcification, and mildly reduced cuspal separation  DOPPLER: There was no evidence for stenosis  Vmax 2 2 m/s, mean gradient 11 mm Hg, maximum   gradient 19 mm Hg  There was no regurgitation  TRICUSPID VALVE: The valve structure was normal  There was normal leaflet separation  DOPPLER: There was no evidence for stenosis  There was moderate regurgitation  Estimated peak PA pressure was 35 - 40 mmHg  The findings suggest mild   pulmonary hypertension  PULMONIC VALVE: Leaflets exhibited normal thickness, no calcification, and normal cuspal separation  DOPPLER: The transpulmonic velocity was within the normal range  There was trace regurgitation       PERICARDIUM: There was no pericardial effusion  The pericardium was normal in appearance  AORTA: The root exhibited normal size  SYSTEMIC VEINS: IVC: The inferior vena cava was dilated  SYSTEM MEASUREMENT TABLES     2D   %FS: 33 3 %   AV Diam: 2 97 cm   EDV(Teich): 111 49 ml   EF(Cube): 70 33 %   EF(Teich): 61 8 %   ESV(Cube): 34 38 ml   ESV(Teich): 42 59 ml   IVSd: 0 94 cm   LA Area: 24 38 cm2   LA Diam: 4 57 cm   LVEDV MOD A4C: 66 42 ml   LVEF MOD A4C: 63 8 %   LVESV MOD A4C: 24 04 ml   LVIDd: 4 88 cm   LVIDs: 3 25 cm   LVLd A4C: 7 9 cm   LVLs A4C: 5 92 cm   LVOT Diam: 1 98 cm   LVPWd: 0 94 cm   RA Area: 23 14 cm2   RV Diam: 3 25 cm   SI(Cube): 38 81 ml/m2   SI(Teich): 32 81 ml/m2   SV MOD A4C: 42 37 ml   SV(Cube): 81 49 ml   SV(Teich): 68 91 ml     CW   AV Env  Ti: 300 68 ms   AV MaxP 66 mmHg   AV SI: 150 83 ml/m2   AV SV: 316 74 ml   AV VTI: 45 69 cm   AV Vmax: 2 16 m/s   AV Vmean: 1 52 m/s   AV meanPG: 10 33 mmHg   TR MaxP 96 mmHg   TR Vmax: 2 78 m/s     MM   TAPSE: 1 95 cm     PW   GABRIEL (VTI): 1 53 cm2   GABRIEL Vmax: 1 45 cm2   LVOT Env  Ti: 321 63 ms   LVOT VTI: 22 85 cm   LVOT Vmax: 1 02 m/s   LVOT Vmean: 0 71 m/s   LVOT maxP 19 mmHg   LVOT meanP 31 mmHg   LVSI Dopp: 33 38 ml/m2   LVSV Dopp: 70 09 ml     [employer] Accredited [facility]chocardi[facility]     Prepared and electronically signed by     Kun William MD   Signed 2017 14:55:47

## 2018-01-15 NOTE — RESULT NOTES
Verified Results  (1) LIPID PANEL, FASTING [id number] 11:30AM [facility]     Test Name Result Flag Reference   CHOLESTEROL 147 mg/dL     HDL,DIRECT 41 mg/dL  40-60   Specimen collection should occur prior to Metamizole administration due to the potential for falsely depressed results  LDL CHOLESTEROL CALCULATED 84 mg/dL  0-100   Triglyceride:         Normal              <150 mg/dl       Borderline High    150-199 mg/dl       High               200-499 mg/dl       Very High          >499 mg/dl  Cholesterol:         Desirable        <200 mg/dl      Borderline High  200-239 mg/dl      High             >239 mg/dl  HDL Cholesterol:        High    >59 mg/dL      Low     <41 mg/dL  LDL CALCULATED:    This screening LDL is a calculated result  It does not have the accuracy of the Direct Measured LDL in the monitoring of patients with hyperlipidemia and/or statin therapy  Direct Measure LDL (ENG154) must be ordered separately in these patients  TRIGLYCERIDES 111 mg/dL  <=150   Specimen collection should occur prior to N-Acetylcysteine or Metamizole administration due to the potential for falsely depressed results  (1) HEPATIC FUNCTION PANEL [id number] 11:30AM [facility]     Test Name Result Flag Reference   ALBUMIN 3 7 g/dL  3 5-5 0   ALK PHOSPHATAS 79 U/L     ALT (SGPT) 29 U/L  12-78   AST(SGOT) 22 U/L  5-45   BILI, DIRECT 0 20 mg/dL  0 00-0 20   BILI, TOTAL 0 80 mg/dL  0 20-1 00   TOTAL PROTEIN 7 6 g/dL  6 4-8 2     (1) TSH [id number] 11:30AM [facility]     Test Name Result Flag Reference   TSH 1 104 uIU/mL  0 358-3 740   Patients undergoing fluorescein dye angiography may retain small amounts of fluorescein in the body for 48-72 hours post procedure  Samples containing fluorescein can produce falsely depressed TSH values  If the patient had this procedure,a specimen should be resubmitted post fluorescein clearance            The recommended reference ranges for TSH during pregnancy are as follows:  First trimester 0 1 to 2 5 uIU/mL  Second trimester  0 2 to 3 0 uIU/mL  Third trimester 0 3 to 3 0 uIU/m

## 2018-01-15 NOTE — PROGRESS NOTES
REPORT NAME: Patient Visit Summary Report   VISIT DATE: 8/2/2016  VISIT TIME: 4:23 PM EDT  PATIENT NAME: Nancy Nunez  MEDICAL RECORD NUMBER: [id number]  SOCIAL SECURITY NUMBER:   YOB: 1940  AGE: 70  REFERRING PHYSICIAN: ANNY HERNANDEZ  SUPERVISING CLINICIAN: [address] [facility],[address] CARE PROFESSIONAL: Link Double   PATIENT HOME ADDRESS: [address] [place], [address] [place], [place] [address]as [facility] PHONE: [phone]  DIAGNOSIS 1: Unspecified atrial fibrillation / I48 91  DIAGNOSIS 2:   DIAGNOSIS 3:   DIAGNOSIS 4:   INR RANGE: 2 - 3  INR GOAL: 2 5  TREATMENT START DATE:   TREATMENT END DATE:   NEXT VISIT: 8/30/2016  [facility]    VISIT RESULTS   ENCOUNTER NUMBER:   TEST LOCATION: [facility]  TEST TYPE:   VISIT TYPE:   CURRENT INR: 2 36 PROTIME:   SPECIMEN COL AND RPT DATE: 8/2/2016 4:23 PM  EDT    VITAL SIGNS  PULSE:  B/P:  WEIGHT:  HEIGHT:  TEMP:     CURRENT ANTICOAGULANT DOSING SCHEDULE  DOSE SIZE: 5mg    ANTICOAGULANT TYPE: WARFARIN  DOSING REGIMEN  Sun       Mon       Tues      Wed       Thurs     Fri       Sat  Total/Wk  7 5       7 5       10        7 5       10        7 5       7 5       57 5    PATIENT MEDICATION INSTRUCTION: Yes  PATIENT NUTRITIONAL COUNSELING: Yes  PATIENT BRUISING INSTRUCTION: Yes      LAST EDUCATION DATE:       PREVIOUS VISIT INFORMATION  VISITDATE   INR Goal  INR   Sun     Mon     Tues    Wed     Thurs   Fri  Sat     Total/wk  8/2/2016    2 5       2 36  7 5     7 5     10      7 5     10      7 5  7 5     57 5  7/7/2016    2 5       2 2   7 5     7 5     10      7 5     10      7 5  7 5     57 5  6/2/2016    2 5       2 56  7 5     7 5     10      7 5     10      7 5  7 5     57 5  5/6/2016    2 5       2 23  7 5     7 5     10      7 5     10      7 5  7 5     57 5    ADDITIONAL PREVIOUS VISIT INFORMATION  VISITDATE   PRIMARY RX               DOSE      CrCl  8/2/2016    WARFARIN                 5mg                 7/7/2016    WARFARIN                 5mg 6/2/2016    WARFARIN                 5mg                 5/6/2016    WARFARIN                 5mg                     OTHER CURRENT MEDICATIONS: WARFARIN      PROGRESS NOTES: l/m no changes / 4 wks    PATIENT INSTRUCTIONS:     TEST LOCATION: [facility]    Electronically signed Alma Tai  on 8/2/2016 at 4:23 PM EDT

## 2018-01-15 NOTE — PROGRESS NOTES
REPORT NAME: Progress Notes Report  VISIT DATE: 3/30/2017  VISIT TIME: 4:05 PM EDT  PATIENT NAME: Shannon Nuñez  MEDICAL RECORD NUMBER: [id number]  YOB: 1940  AGE: 78  REFERRING PHYSICIAN: Mabel Vidales  SUPERVISING CLINICIAN: [address] CARE PROVIDER: Erich Voss   PATIENT HOME ADDRESS: [address] PHONE: [phone]  SOCIAL SECURITY NUMBER:   DIAGNOSIS 1: Unspecified atrial fibrillation / I48 91  DIAGNOSIS 2:   INR RANGE: 2 - 3  INR GOAL: 2 5  TREATMENT START DATE:   TREATMENT END DATE:   NEXT VISIT: 4/27/2017  [facility]  VISIT RESULTS  ENCOUNTER NUMBER:   TEST LOCATION: [facility]  TEST TYPE:   VISIT TYPE:   CURRENT INR: 2 5 PROTIME:   SPECIMEN COL AND RPT DATE: 3/30/2017 4:05 PM  EDT  VITAL SIGNS  PULSE:  BP: / WEIGHT:  HEIGHT:  TEMP:   CURRENT ANTICOAGULANT DOSING SCHEDULE  DOSE SIZE: 5mg    ANTICOAGULANT TYPE: WARFARIN  DOSING REGIMEN  Sun       Mon       Tues      Wed       Thurs     Fri       Sat  Total/Wk  7 5       7 5       10        7 5       10        7 5       7 5       57 5  PATIENT MEDICATION INSTRUCTION: Yes  PATIENT NUTRITIONAL COUNSELING: Yes  PATIENT BRUISING INSTRUCTION: Yes  LAST EDUCATION DATE:   PREVIOUS VISIT INFORMATION  VISITDATE  INRGoal INR   Sun    Mon    Tues   Wed    Thurs  Fri    Sat  Total/wk  3/30/2017   2 5     2 5   7 5    7 5    10     7 5    10     7 5    7 5  57 5  2/27/2017   2 5     2 4   7 5    7 5    10     7 5    10     7 5    7 5  57 5  1/19/2017   2 5     2 22  7 5    7 5    10     7 5    10     7 5    7 5  57 5  12/16/2016  2 5     2 24  7 5    7 5    10     7 5    10     7 5    7 5  57 5  ADDITIONAL PREVIOUS VISIT INFORMATION  VISITDATE   PRIMARY RX               DOSE      CrCl  3/30/2017   WARFARIN                 5mg  2/27/2017   WARFARIN                 5mg  1/19/2017   WARFARIN                 5mg  12/16/2016  WARFARIN                 5mg  OTHER CURRENT MEDICATIONS:  WARFARIN  PROGRESS NOTES: spoke to pt / no changes / 4 wks  PATIENT INSTRUCTIONS:   TEST LOCATION: [facility], , ,   INBOUND LAB DATA:  Lab       Lab Value Col Date                 Rpt Date                 Lab  Reference Range  Electronically signed byElene Curling  on 3/30/2017 4:05 PM EDT

## 2018-01-15 NOTE — RESULT NOTES
Message   reminder for 3 years  Verified Results  (1) TISSUE EXAM [id number] 03:34PM [facility]     Test Name Result Flag Reference   LAB AP CASE REPORT (Report)     Surgical Pathology Report             Case: [id number]                   Authorizing Provider: Shai Villanueva MD       Collected:      02/08/2017 1534        Ordering Location:   [address] [place] [address]   Received:      02/09/2017 [facility]                               Pathologist:      Shani Suero MD                              Specimens:  A) - Polyp, Colorectal, Transverse colon polyp                             B) - Polyp, Colorectal, Transverse colon polyp                             C) - Polyp, Colorectal, Descending colon polyp x2   LAB AP FINAL DIAGNOSIS (Report)     A  Colon, transverse, polypectomy:        - Tubular adenoma  - No high-grade dysplasia or malignancy is identified  B  Colon, transverse, polypectomy:        - Tubular adenoma  - No high-grade dysplasia or malignancy is identified  C  Colon, descending, polypectomy (X 2):        - Tubular adenoma in 3 of multiple tissue fragments         - No high-grade dysplasia or malignancy is identified  Interpretation performed at , [address]   Electronically signed by Shani Suero MD on 2/10/2017 at 12:10 PM   LAB AP SURGICAL ADDITIONAL INFORMATION (Report)     These tests were developed and their performance characteristics   determined by [facility] or [facility]  They may not be cleared or approved by the [place]  Food and   Drug Administration  The FDA has determined that such clearance or   approval is not necessary  These tests are used for clinical purposes  They should not be regarded as investigational or for research   This   laboratory has been approved by CLIA 88, designated as a high-complexity   laboratory and is qualified to perform these tests  LAB AP GROSS DESCRIPTION (Report)     A  The specimen is received in formalin, labeled with the patient's name   and hospital number, and is designated transverse colon polyp, are two   irregularly shaped fragments of tan soft tissue measuring 0 7 and 0 3 cm   in greatest dimension  Entirely submitted  One cassette  B  The specimen is received in formalin, labeled with the patient's name   and hospital number, and is designated transverse colon polyp, is a   single irregularly shaped fragment of tan soft tissue measuring 0 4 cm in   greatest dimension  Entirely submitted  One cassette  C  The specimen is received in formalin, labeled with the patient's name   and hospital number, and is designated descending colon polyp ??2, are   multiple irregularly shaped fragments of tan soft tissue measuring 1 0 x   0 8 x 0 1 cm in aggregate  Entirely submitted  One cassette  Note: The estimated total formalin fixation time based upon information   provided by the submitting clinician and the standard processing schedule   is 22 25 hours  RLR       Discussion/Summary   the colon polyps removed were benign but precancerous  They were removed  I would like to repeat colonoscopy in 3 years

## 2018-01-15 NOTE — PROGRESS NOTES
REPORT NAME: Patient Visit Summary Report   VISIT DATE: 2/27/2017  VISIT TIME: 9:04 AM EST  PATIENT NAME: Jessica De La Torre  MEDICAL RECORD NUMBER: [id number]  SOCIAL SECURITY NUMBER:   YOB: 1940  AGE: 78  REFERRING PHYSICIAN: Dotty Grider  SUPERVISING CLINICIAN: ANNY HERNANDEZ  HEALTH CARE PROFESSIONAL: Suze Francis   PATIENT HOME ADDRESS: [address] PHONE: [phone]  DIAGNOSIS 1: Unspecified atrial fibrillation / I48 91  DIAGNOSIS 2:   DIAGNOSIS 3:   DIAGNOSIS 4:   INR RANGE: 2 - 3  INR GOAL: 2 5  TREATMENT START DATE:   TREATMENT END DATE:   NEXT VISIT: 3/27/2017  [facility]    VISIT RESULTS   ENCOUNTER NUMBER:   TEST LOCATION: [facility]  TEST TYPE:   VISIT TYPE:   CURRENT INR: 2 4 PROTIME:   SPECIMEN COL AND RPT DATE: 2/27/2017 9:04 AM  EST    VITAL SIGNS  PULSE:  B/P:  WEIGHT:  HEIGHT:  TEMP:     CURRENT ANTICOAGULANT DOSING SCHEDULE  DOSE SIZE: 5mg    ANTICOAGULANT TYPE: WARFARIN  DOSING REGIMEN  Sun       Mon       Tues      Wed       Thurs     Fri       Sat  Total/Wk  7 5       7 5       10        7 5       10        7 5       7 5       57 5    PATIENT MEDICATION INSTRUCTION: Yes  PATIENT NUTRITIONAL COUNSELING: Yes  PATIENT BRUISING INSTRUCTION: Yes      LAST EDUCATION DATE:       PREVIOUS VISIT INFORMATION  VISITDATE   INR Goal  INR   Sun     Mon     Tues    Wed     Thurs   Fri  Sat     Total/wk  2/27/2017   2 5       2 4   7 5     7 5     10      7 5     10      7 5  7 5     57 5  1/19/2017   2 5       2 22  7 5     7 5     10      7 5     10      7 5  7 5     57 5  12/16/2016  2 5       2 24  7 5     7 5     10      7 5     10      7 5  7 5     57 5  11/2/2016   2 5       2 9   7 5     7 5     10      7 5     10      7 5  7 5     57 5    ADDITIONAL PREVIOUS VISIT INFORMATION  VISITDATE   PRIMARY RX               DOSE      CrCl  2/27/2017   WARFARIN                 5mg                 1/19/2017   WARFARIN 5mg                 12/16/2016  WARFARIN                 5mg                 11/2/2016   WARFARIN                 5mg                     OTHER CURRENT MEDICATIONS: WARFARIN      PROGRESS NOTES: l/m no changes / 4 wks    PATIENT INSTRUCTIONS:     TEST LOCATION: [facility]    Electronically signed byRod Carrier  on 2/27/2017 at 9:04 AM EST

## 2018-01-15 NOTE — CONSULTS
Chief Complaint  Patient is here for a followup  Patient denies cardiac complaints  History of Present Illness  Mrs Ortiz is here for followup  She fell on thanksgiving day and was sedentary for quite a while  When she started to get back into her routine she was getting out of breath easily  Her BP was mostly in the 580Y - 757Q systolic with occasional 155O  Since then her dyspnea has actually resolved and she is back to her usual self  The patient presents with permanent atrial fibrillation  The treatment strategy for this patient is rate control  Symptoms: denies palpitations, denies chest pain, denies exercise intolerance, denies dyspnea on exertion and denies dizziness  Associated symptoms include no syncope, no focal neurologic deficit, no tendency for easy bleeding and no tendency for easy bruising  Monitoring: The patient has regular PT/INR checks  Risks: no increased risk for falling  Medications: the patient is adherent with her medication regimen  She denies medication side effects  The patient presents for follow-up of essential hypertension  The patient states she has been stable with her blood pressure control since the last visit  Symptoms: denies impaired vision, denies dyspnea, denies chest pain, denies intermittent leg claudication and denies lower extremity edema  Associated symptoms include no headache, no focal neurologic deficits and no memory loss  Medications: the patient is adherent with her medication regimen  She denies medication side effects  Review of Systems      Cardiac: rhythm problems, but no chest pain, no fainting/blackouts, no heart murmur present, no signs of swelling and no palpitations present  Skin: No complaints of nonhealing sores or skin rash     Genitourinary: No complaints of recurrent urinary tract infections, frequent urination at night, difficult urination, blood in urine, kidney stones, loss of bladder control, kidney problems, denies any birth control or hormone replacement, is not post menopausal, not currently pregnant  Psychological: No complaints of feeling depressed, anxiety, panic attacks, or difficulty concentrating  General: No complaints of trouble sleeping, lack of energy, fatigue, appetite changes, weight changes, fever, frequent infections, or night sweats  Respiratory: shortness of breath  HEENT: No complaints of serious problems, hearing problems, nose problems, throat problems, or snoring  Gastrointestinal: No complaints of liver problems, nausea, vomiting, heartburn, constipation, bloody stools, diarrhea, problems swallowing, adbominal pain, or rectal bleeding  Hematologic: No complaints of bleeding disorders, anemia, blood clots, or excessive brusing  Neurological: No complaints of numbness, tingling, dizziness, weakness, seizures, headaches, syncope or fainting, AM fatigue, daytime sleepiness, no witnessed apnea episodes  Musculoskeletal: arthritis     ROS reviewed  Active Problems    1  Abnormal glucose (790 29) (R73 09)   2  AF (paroxysmal atrial fibrillation) (427 31) (I48 0)   3  Anticoagulant long-term use (V58 61) (Z79 01)   4  Arthritis (716 90) (M19 90)   5  Atrial fibrillation (427 31) (I48 91)   6  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 70)   7  History of Breast Cancer (V10 3)   8  Cataract (366 9) (H26 9)   9  Esophageal reflux (530 81) (K21 9)   10  Gross hematuria (599 71) (R31 0)   11  Hematoma of lower extremity, right, initial encounter (924 5) (S80 11XA)   12  Hiatal hernia (553 3) (K44 9)   13  History of allergy (V15 09) (Z88 9)   14  History of fall (V15 88) (Z91 81)   15  Hypertension (401 9) (I10)   16  Lichen sclerosus et atrophicus (701 0) (L90 0)   17  Multiple lung nodules (793 19) (R91 8)   18  Neck pain (723 1) (M54 2)   19  Need for pneumococcal vaccination (V03 82) (Z23)   20  Obesity (278 00) (E66 9)   21  Osteopenia (733 90) (M85 80)   22  Pacemaker (V45 01) (Z95 0)   23  Peripheral neuropathy (356 9) (G62 9)   24  Restless legs syndrome (333 94) (G25 81)   25  Right knee pain (719 46) (M25 561)   26  Ringing In The Ears (Tinnitus) (388 30)   27  SOB (shortness of breath) (786 05) (R06 02)   28  Traumatic blister of right lower extremity, initial encounter (916 2) (S80 821A)   29  Urinary incontinence (788 30) (R32)    Past Medical History    · Atrial fibrillation (427 31) (I48 91)   · History of Breast Cancer (V10 3)   · History of Encounter for screening mammogram for malignant neoplasm of breast  (V76 12) (Z12 31)   · Esophageal reflux (530 81) (K21 9)   · Denied: History of Exposure To STD   · History of backache (V13 59) (Z87 39)   · History of chest pain (V13 89) (H29 521)   · History of hematuria (V13 09) (Z87 448)   · History of postmenopausal hormone replacement therapy (V87 49) (Z92 29)   · Normal delivery (650) (O80,Z37  9)   · History of Skin rash (782 1) (R21)   · History of UTI (lower urinary tract infection) (599 0) (N39 0)    The active problems and past medical history were reviewed and updated today  Surgical History    · Denied: History of Abnormal Pap Smear Of Cervix   · History of Breast Surgery Lumpectomy   · History of Cataract Surgery   · History of Diagnostic Cystoscopy   · History of Excision Lesion Of Meniscus Or Capsule Knee   · History of Pacemaker - Pulse Generator Replacement   · History of Pacemaker Placement   · History of Pacemaker Placement   · History of Radiation Therapy   · History of Total Abdominal Hysterectomy With Removal Of Both Ovaries    The surgical history was reviewed and updated today         Family History    · Family history of Coronary Artery Disease (V17 49)   · Family history of abdominal aortic aneurysm (V17 49) (Z82 49)    · Family history of Breast Cancer (V16 3)    · Family history of Colon Cancer (V16 0)   · Family history of Colon Cancer (V16 0)    · Denied: Family history of Diabetes Mellitus   · Denied: Family history of Stroke Syndrome    The family history was reviewed and updated today  Social History    · Being A Social Drinker   · Denied: History of Drug Use   · Former smoker (V15 82) (Q86 085)   · Marital History - Currently    · Retired From Work  The social history was reviewed and updated today  Current Meds   1  Claritin 10 MG Oral Tablet; take 1 tablet daily prn; Therapy: 14VKH6044 to (Evaluate:10Jan2016); Last Rx:11Nov2015 Ordered   2  Clindamycin HCl - 150 MG Oral Capsule; TAKE 3 CAPSULES(450MG) EVERY 8 HOURS   FOR 7 MORE DAYS; Therapy: 76NVL1703 to (Last Rx:07Poq3065) Ordered   3  Clobetasol Propionate 0 05 % External Ointment; Apply to affected area twice weekly; Therapy: 89NTI3473 to (Last Rx:08Oct2015)  Requested for: 12Oct2015 Ordered   4  Fluticasone Propionate 50 MCG/ACT Nasal Suspension; Therapy: 77Xbz6341 to Recorded   5  Gabapentin 600 MG Oral Tablet; TAKE 1 TABLET 4 TIMES DAILY; Therapy: 13RYJ7719 to Recorded   6  Lisinopril 40 MG Oral Tablet; TAKE ONE TABLET BY MOUTH EVERY DAY AS DIRECTED; Therapy: 63DMM6865 to (Mala Gaitan)  Requested for: 08OJM8164; Last   Rx:11Nov2015 Ordered   7  Metoprolol Tartrate 50 MG Oral Tablet; take one tablet by mouth twice daily  Requested   for: 34ZVF4641; Last Rx:76Onu9523 Ordered   8  Multivitamins CAPS; TAKE 1 CAPSULE DAILY; Therapy: (Recorded:11Mar2014) to Recorded   9  Nitrostat 0 4 MG Sublingual Tablet Sublingual; PLACE 1 TABLET UNDER THE TONGUE   EVERY 5 MINUTES FOR UP TO 3 DOSES AS NEEDED FOR CHEST PAIN  CALL   911 IF PAIN PERSISTS; Therapy: 13RHI8573 to (Evaluate:74Stm2456)  Requested for: 53SMJ5459; Last   Rx:16Oct2014 Ordered   10  Pantoprazole Sodium 40 MG Oral Tablet Delayed Release; Take 1 tablet twice daily; Last    Rx:16Oct2014 Ordered   11  Pramipexole Dihydrochloride 0 25 MG Oral Tablet; take 2 tablets at bedtime; Therapy: 27JEB6167 to (Last Rx:11Nov2015) Ordered   12  TraMADol HCl - 50 MG Oral Tablet;  Take 1 tablet 3 times daily as needed; Therapy: 26HRZ7050 to (Last Rx:20Yac0699) Ordered   13  Warfarin Sodium 5 MG Oral Tablet; take 1 1/2 to 2 tabs by mouth daily or as directed     Requested for: 12Oct2015; Last Rx:08Oct2015 Ordered    The medication list was reviewed and updated today  Allergies    1  Cephalexin CAPS   2  Erythromycin Base TABS   3  Keflex TABS   4  Penicillins   5  Sulfa Drugs    Vitals   Recorded: 11MEF2787 10:13AM   Heart Rate 80   Systolic 125, RUE, Sitting   Diastolic 90, RUE, Sitting   BP Cuff Size Large   Height 5 ft 4 in   Weight 235 lb    BMI Calculated 40 34   BSA Calculated 2 09     Physical Exam    Constitutional   General appearance: No acute distress, well appearing and well nourished  Eyes   Conjunctiva and Sclera examination: Conjunctiva pink, sclera anicteric  Ears, Nose, Mouth, and Throat - Oropharynx: Clear, nares are clear, mucous membranes are moist    Neck   Neck and thyroid: Normal, supple, trachea midline, no thyromegaly  Pulmonary   Respiratory effort: No increased work of breathing or signs of respiratory distress  Auscultation of lungs: Clear to auscultation, no rales, no rhonchi, no wheezing, good air movement  Cardiovascular   Auscultation of heart: Abnormal   irregularly irregular 2/6 holosystolic murmur LLSB  Carotid pulses: Normal, 2+ bilaterally  Peripheral vascular exam: Normal pulses throughout, no tenderness, erythema or swelling  Pedal pulses: Normal, 2+ bilaterally  Examination of extremities for edema and/or varicosities: Normal     Abdomen   Abdomen: Non-tender and no distention  Liver and spleen: No hepatomegaly or splenomegaly  Musculoskeletal Gait and station: Normal gait  Digits and nails: Normal without clubbing or cyanosis  Inspection/palpation of joints, bones, and muscles: Normal, ROM normal     Skin - Skin and subcutaneous tissue: Normal without rashes or lesions  Skin is warm and well perfused, normal turgor  Neurologic - Speech: Normal     Psychiatric - Orientation to person, place, and time: Normal  Mood and affect: Normal       Assessment    1  Atrial fibrillation (427 31) (I48 91)   · s/p 2 unsuccessful ablation, s/p 2 cardioversion last in 2010, s/p pacemaker  on anticoagulation followed by cardiology   2  SOB (shortness of breath) (786 05) (R06 02)   3  Hypertension (401 9) (I10)   4  Pacemaker (V45 01) (Z95 0)    Plan  Atrial fibrillation, Hypertension, Pacemaker, SOB (shortness of breath)    · ECHO COMPLETE WITH CONTRAST IF INDICATED; Status:Hold For - Scheduling;  Requested IHR:12JJV5451;    Perform:[facility]; DCX:10IRO7255;KMLHHKG; For:Atrial fibrillation, Hypertension, Pacemaker, SOB (shortness of breath); Ordered By:Lily Phan; Pacemaker    · Follow-up visit in 6 months Evaluation and Treatment  Follow-up  Status: Hold For -  Scheduling  Requested for: 94PXC9959   Ordered; For: Pacemaker; Ordered By: Ramona Lord Performed:  Due: 23SDC6370    Discussion/Summary    1  Chronic Atrial Fibrillation: Overall doing well  Continue Metoprolol and Coumadin  Relatively asymptomatic  SHe has device check next week and can see how rate control is going  Heart rate control is okay today  2  Hypertension: BP is for the most part controlled on current medical therapy, no changes  She will check her BP at home  Low salt diet  3  s/p PPM: Continue PPM checks  4  Shortness of breath: back to baseline  WIll update echocardiogram    The patient was counseled regarding diagnostic results, instructions for management, risk factor reductions, impressions  total time of encounter was 25 minutes and 15 minutes was spent counseling        Future Appointments    Signatures   Electronically signed by : LAUREL Naik ; Jan 11 2016 10:28AM EST                       (Author)

## 2018-01-16 NOTE — PROGRESS NOTES
REPORT NAME: Progress Notes Report  VISIT DATE: 10/19/2017  VISIT TIME: 10:18 AM EDT  PATIENT NAME: Aleksandar Martinez  MEDICAL RECORD NUMBER: [id number]  YOB: 1940  AGE: 78  REFERRING PHYSICIAN: Delia Brambila  SUPERVISING CLINICIAN: [address] CARE PROVIDER: [facility]  PATIENT HOME ADDRESS: [address] [place] [address] PHONE: [phone]  SOCIAL SECURITY NUMBER:   DIAGNOSIS 1: Unspecified atrial fibrillation / I48 91  DIAGNOSIS 2:   INR RANGE: 2 - 3  INR GOAL: 2 5  TREATMENT START DATE:   TREATMENT END DATE:   NEXT VISIT: 11/9/2017  [facility]  VISIT RESULTS  ENCOUNTER NUMBER:   TEST LOCATION: [facility]  TEST TYPE:   VISIT TYPE:   CURRENT INR: 2 83 PROTIME:   SPECIMEN COL AND RPT DATE: 10/19/2017 10:18 AM  EDT  VITAL SIGNS  PULSE:  BP: / WEIGHT:  HEIGHT:  TEMP:   CURRENT ANTICOAGULANT DOSING SCHEDULE  DOSE SIZE: 5mg    ANTICOAGULANT TYPE: WARFARIN  DOSING REGIMEN  Sun       Mon       Tues      Wed       Thurs     Fri       Sat  Total/Wk  10        7 5       7 5       7 5       10        7 5       7 5       57 5  PATIENT MEDICATION INSTRUCTION: No  PATIENT NUTRITIONAL COUNSELING: No  PATIENT BRUISING INSTRUCTION: No  LAST EDUCATION DATE:   PREVIOUS VISIT INFORMATION  VISITDATE  INRGoal INR   Sun    Mon    Tues   Wed    Thurs  Fri    Sat  Total/wk  10/19/2017  2 5     2 83  10     7 5    7 5    7 5    10     7 5    7 5  57 5  10/4/2017   2 5     1 72  10     7 5    7 5    7 5    10     7 5    7 5  57 5  9/8/2017    2 5     2 6   7 5    7 5    7 5    7 5    10     7 5    7 5  55  8/25/2017   2 5     3 51  7 5    7 5    10     7 5    10     7 5    7 5  57 5  ADDITIONAL PREVIOUS VISIT INFORMATION  VISITDATE   PRIMARY RX               DOSE      CrCl  10/19/2017  WARFARIN                 5mg  10/4/2017   WARFARIN                 5mg  9/8/2017    WARFARIN                 5mg  8/25/2017   WARFARIN                 5mg  OTHER CURRENT MEDICATIONS:  WARFARIN  PROGRESS NOTES: l/m for pt, retest inr in 3 wks    PATIENT INSTRUCTIONS:   TEST LOCATION: [facility], , ,   INBOUND LAB DATA:  Lab       Lab Value Col Date                 Rpt Date                 Lab  Reference Range  Electronically signed byGabbi Herron on 10/20/2017 10:18 AM EDT

## 2018-01-16 NOTE — RESULT NOTES
Verified Results  (1) PT WITH INR [id number] 02:20PM Asael Dukes     Test Name Result Flag Reference   INR 2 48 H 0 86-1 16   PT 25 6 seconds H 11 8-14 1

## 2018-01-16 NOTE — RESULT NOTES
Verified Results  (1) PT WITH INR [id number] 11:24AM Blanca Ricci     Test Name Result Flag Reference   INR 2 61 H 0 86-1 16   PT 27 7 seconds H 9 4-11 7

## 2018-01-16 NOTE — MISCELLANEOUS
Date: 11/10/2016   Dear Shantal Buck:     We have attempted to reach you regarding your upcoming appointment on 11/30/2016 and with Dr Jessi Carrasquillo  Unfortunately, due to a change in the provider's schedule we need to change your appointment  Please call our office as soon as possible so we can reschedule your appointment  We apologize for any inconvenience this may cause  Thank you in advance for your cooperation and assistance       Sincerely,   [facility]      Signatures   Electronically signed by : Carmen Avendaño, ; Nov 10 2016  5:29PM EST                       (Author)

## 2018-01-16 NOTE — RESULT NOTES
Verified Results  (1) PT WITH INR [id number] 11:50AM Nette Small     Test Name Result Flag Reference   INR 3 51 H 0 86-1 16   PT 37 3 seconds H 9 4-11 7

## 2018-01-16 NOTE — PROGRESS NOTES
REPORT NAME: Patient Visit Summary Report   VISIT DATE: 9/6/2016  VISIT TIME: 12:45 PM EDT  PATIENT NAME: Ching Jaramillo  MEDICAL RECORD NUMBER: [id number]  SOCIAL SECURITY NUMBER:   YOB: 1940  AGE: 70  REFERRING PHYSICIAN: ANNY HERNANDEZ  SUPERVISING CLINICIAN: ANNY HERNANDEZ  HEALTH CARE PROFESSIONAL: Melven Eisenmenger   PATIENT HOME ADDRESS: [address] PHONE: [phone]  DIAGNOSIS 1: Unspecified atrial fibrillation / I48 91  DIAGNOSIS 2:   DIAGNOSIS 3:   DIAGNOSIS 4:   INR RANGE: 2 - 3  INR GOAL: 2 5  TREATMENT START DATE:   TREATMENT END DATE:   NEXT VISIT: 10/4/2016  [facility]    VISIT RESULTS   ENCOUNTER NUMBER:   TEST LOCATION: [facility]  TEST TYPE:   VISIT TYPE:   CURRENT INR: 2 13 PROTIME:   SPECIMEN COL AND RPT DATE: 9/6/2016 12:45 PM  EDT    VITAL SIGNS  PULSE:  B/P:  WEIGHT:  HEIGHT:  TEMP:     CURRENT ANTICOAGULANT DOSING SCHEDULE  DOSE SIZE: 5mg    ANTICOAGULANT TYPE: WARFARIN  DOSING REGIMEN  Sun       Mon       Tues      Wed       Thurs     Fri       Sat  Total/Wk  7 5       7 5       10        7 5       10        7 5       7 5       57 5    PATIENT MEDICATION INSTRUCTION: Yes  PATIENT NUTRITIONAL COUNSELING: Yes  PATIENT BRUISING INSTRUCTION: Yes      LAST EDUCATION DATE:       PREVIOUS VISIT INFORMATION  VISITDATE   INR Goal  INR   Sun     Mon     Tues    Wed     Thurs   Fri  Sat     Total/wk  9/6/2016    2 5       2 13  7 5     7 5     10      7 5     10      7 5  7 5     57 5  8/2/2016    2 5       2 36  7 5     7 5     10      7 5     10      7 5  7 5     57 5  7/7/2016    2 5       2 2   7 5     7 5     10      7 5     10      7 5  7 5     57 5  6/2/2016    2 5       2 56  7 5     7 5     10      7 5     10      7 5  7 5     57 5    ADDITIONAL PREVIOUS VISIT INFORMATION  VISITDATE   PRIMARY RX               DOSE      CrCl  9/6/2016    WARFARIN                 5mg                 8/2/2016    WARFARIN 5mg                 7/7/2016    WARFARIN                 5mg                 6/2/2016    WARFARIN                 5mg                     OTHER CURRENT MEDICATIONS: WARFARIN      PROGRESS NOTES: l/m no changes / 4 wks    PATIENT INSTRUCTIONS:     TEST LOCATION: [facility]    Electronically signed Alma Tai  on 9/6/2016 at 12:45 PM EDT

## 2018-01-16 NOTE — RESULT NOTES
Verified Results  (1) PT WITH INR [id number] 01:00PM Iliana Laureano     Test Name Result Flag Reference   INR 2 23 H 0 86-1 16   PT 24 7 seconds H 9 4-11 7

## 2018-01-17 NOTE — RESULT NOTES
Verified Results  (1) PT WITH INR [id number] 11:30AM Mega Goodpasture     Test Name Result Flag Reference   INR 2 40 H 0 86-1 16   PT 25 9 seconds H 9 4-11 7       Plan  AF (paroxysmal atrial fibrillation)    · (1) PT WITH INR; Status:Active; Requested for:01Mar2017;    · (1) PT WITH INR; Status: In Progress - Order Generated; Requested for:Recurring  Schedule: 3/1/2017; 3/8/2017; 3/15/2017; 3/22/2017; 3/29/2017; 4/5/2017; 4/12/2017;  4/19/2   ;

## 2018-01-17 NOTE — RESULT NOTES
Verified Results  (1) PT WITH INR [id number] 02:11PM Savannah FOLEY Order Number: [id number]     Test Name Result Flag Reference   INR 2 24 H 0 86-1 16   - Patient Instructions: INR WEEKLY OR AS ORDERED BY PHYSICIAN, ORDERS FOR MARCH 21, 2016 TO MARCH 21, 2017    - Patient Instructions: INR WEEKLY OR AS ORDERED BY PHYSICIAN, ORDERS FOR MARCH 21, 2016 TO MARCH 21, 2017   PT 24 5 seconds H 12 0-14 3

## 2018-01-17 NOTE — PROGRESS NOTES
REPORT NAME: Progress Notes Report  VISIT DATE: 5/17/2017  VISIT TIME: 8:39 AM EDT  PATIENT NAME: Sherryle Carp  MEDICAL RECORD NUMBER: [id number]  YOB: 1940  AGE: 78  REFERRING PHYSICIAN: Evangelina Alberto  SUPERVISING CLINICIAN: [address] CARE PROVIDER: Marina Lozoya   PATIENT HOME ADDRESS: [address] [facility] PHONE: [phone]  SOCIAL SECURITY NUMBER:   DIAGNOSIS 1: Unspecified atrial fibrillation / I48 91  DIAGNOSIS 2:   INR RANGE: 2 - 3  INR GOAL: 2 5  TREATMENT START DATE:   TREATMENT END DATE:   NEXT VISIT: 6/13/2017  [facility]  VISIT RESULTS  ENCOUNTER NUMBER:   TEST LOCATION: [facility]  TEST TYPE:   VISIT TYPE:   CURRENT INR: 2 3 PROTIME:   SPECIMEN COL AND RPT DATE: 5/17/2017 8:39 AM  EDT  VITAL SIGNS  PULSE:  BP: / WEIGHT:  HEIGHT:  TEMP:   CURRENT ANTICOAGULANT DOSING SCHEDULE  DOSE SIZE: 5mg    ANTICOAGULANT TYPE: WARFARIN  DOSING REGIMEN  Sun       Mon       Tues      Wed       Thurs     Fri       Sat  Total/Wk  7 5       7 5       10        7 5       10        7 5       7 5       57 5  PATIENT MEDICATION INSTRUCTION: Yes  PATIENT NUTRITIONAL COUNSELING: Yes  PATIENT BRUISING INSTRUCTION: Yes  LAST EDUCATION DATE:   PREVIOUS VISIT INFORMATION  VISITDATE  INRGoal INR   Sun    Mon    Tues   Wed    Thurs  Fri    Sat  Total/wk  5/17/2017   2 5     2 3   7 5    7 5    10     7 5    10     7 5    7 5  57 5  4/4/2017    2 5     2 2   7 5    7 5    10     7 5    10     7 5    7 5  57 5  3/30/2017   2 5     2 5   7 5    7 5    10     7 5    10     7 5    7 5  57 5  2/27/2017   2 5     2 4   7 5    7 5    10     7 5    10     7 5    7 5  57 5  ADDITIONAL PREVIOUS VISIT INFORMATION  VISITDATE   PRIMARY RX               DOSE      CrCl  5/17/2017   WARFARIN                 5mg  4/4/2017    WARFARIN                 5mg  3/30/2017   WARFARIN                 5mg  2/27/2017   WARFARIN                 5mg  OTHER CURRENT MEDICATIONS:  WARFARIN  PROGRESS NOTES: l/m no changes / 4 wks  PATIENT INSTRUCTIONS:   TEST LOCATION: [facility], , ,   INBOUND LAB DATA:  Lab       Lab Value Col Date                 Rpt Date                 Lab  Reference Range  Electronically signed Deborah Correa  on 5/17/2017 8:39 AM EDT

## 2018-01-18 NOTE — PROGRESS NOTES
REPORT NAME: Patient Visit Summary Report   VISIT DATE: 6/2/2016  VISIT TIME: 3:59 PM EDT  PATIENT NAME: Stefanie Nj  MEDICAL RECORD NUMBER: [id number]  SOCIAL SECURITY NUMBER:   YOB: 1940  AGE: 70  REFERRING PHYSICIAN: ANNY HERNANDEZ  SUPERVISING CLINICIAN: [address] [facility],[address] CARE PROFESSIONAL: [address]   PATIENT HOME ADDRESS: [address], [address] [place] [address] PHONE: [phone]  DIAGNOSIS 1: Unspecified atrial fibrillation / I48 91  DIAGNOSIS 2:   DIAGNOSIS 3:   DIAGNOSIS 4:   INR RANGE: 2 - 3  INR GOAL: 2 5  TREATMENT START DATE:   TREATMENT END DATE:   NEXT VISIT: 6/30/2016  [facility]    VISIT RESULTS   ENCOUNTER NUMBER:   TEST LOCATION: [facility]  TEST TYPE:   VISIT TYPE:   CURRENT INR: 2 56 PROTIME:   SPECIMEN COL AND RPT DATE: 6/2/2016 3:59 PM  EDT    VITAL SIGNS  PULSE:  B/P:  WEIGHT:  HEIGHT:  TEMP:     CURRENT ANTICOAGULANT DOSING SCHEDULE  DOSE SIZE: 5mg    ANTICOAGULANT TYPE: WARFARIN  DOSING REGIMEN  Sun       Mon       Tues      Wed       Thurs     Fri       Sat  Total/Wk  7 5       7 5       10        7 5       10        7 5       7 5       57 5    PATIENT MEDICATION INSTRUCTION: Yes  PATIENT NUTRITIONAL COUNSELING: Yes  PATIENT BRUISING INSTRUCTION: Yes      LAST EDUCATION DATE:       PREVIOUS VISIT INFORMATION  VISITDATE   INR Goal  INR   Sun     Mon     Tues    Wed     Thurs   Fri  Sat     Total/wk  6/2/2016    2 5       2 56  7 5     7 5     10      7 5     10      7 5  7 5     57 5  5/6/2016    2 5       2 23  7 5     7 5     10      7 5     10      7 5  7 5     57 5  4/6/2016    2 5       2     7 5     7 5     10      7 5     10      7 5  7 5     57 5  3/22/2016   2 5       2 29  7 5     7 5     10      7 5     10      7 5  7 5     57 5    ADDITIONAL PREVIOUS VISIT INFORMATION  VISITDATE   PRIMARY RX               DOSE      CrCl  6/2/2016    WARFARIN                 5mg                 5/6/2016    WARFARIN                 5mg 4/6/2016    WARFARIN                 5mg                 3/22/2016   WARFARIN                 5mg                     OTHER CURRENT MEDICATIONS: WARFARIN      PROGRESS NOTES: l/m no changes / 4 wks    PATIENT INSTRUCTIONS:     TEST LOCATION: [facility]    Electronically signed byPeter Peña  on 6/2/2016 at 3:59 PM EDT

## 2018-01-18 NOTE — RESULT NOTES
Verified Results  (1) CBC/PLT/DIFF [id number] 11:32AM Yohannes Dean Order Number: [id number]     Test Name Result Flag Reference   WBC COUNT 6 74 Thousand/uL  4 31-10 16   RBC COUNT 4 43 Million/uL  3 81-5 12   HEMOGLOBIN 13 1 g/dL  11 5-15 4   HEMATOCRIT 39 9 %  34 8-46  1   MCV 90 fL  82-98   MCH 29 6 pg  26 8-34 3   MCHC 32 8 g/dL  31 4-37 4   RDW 14 7 %  11 6-15 1   MPV 11 1 fL  8 9-12 7   PLATELET COUNT 338 Thousands/uL  149-390   NEUTROPHILS RELATIVE PERCENT 58 %  43-75   LYMPHOCYTES RELATIVE PERCENT 31 %  14-44   MONOCYTES RELATIVE PERCENT 8 %  4-12   EOSINOPHILS RELATIVE PERCENT 3 %  0-6   BASOPHILS RELATIVE PERCENT 0 %  0-1   NEUTROPHILS ABSOLUTE COUNT 3 92 Thousands/?L  1 85-7 62   LYMPHOCYTES ABSOLUTE COUNT 2 10 Thousands/?L  0 60-4 47   MONOCYTES ABSOLUTE COUNT 0 53 Thousand/?L  0 17-1 22   EOSINOPHILS ABSOLUTE COUNT 0 17 Thousand/?L  0 00-0 61   BASOPHILS ABSOLUTE COUNT 0 02 Thousands/?L  0 00-0 10   - Patient Instructions: This bloodwork is non-fasting  Please drink two glasses of water morning of bloodwork  - Patient Instructions: This bloodwork is non-fasting  Please drink two glasses of water morning of bloodwork  (1) COMPREHENSIVE METABOLIC PANEL [id number] 42:02OD Yohannes Dean Order Number: [id number]     Test Name Result Flag Reference   GLUCOSE,RANDM 91 mg/dL     If the patient is fasting, the ADA then defines impaired fasting glucose as > 100 mg/dL and diabetes as > or equal to 123 mg/dL     SODIUM 140 mmol/L  136-145   POTASSIUM 4 5 mmol/L  3 5-5 3   CHLORIDE 105 mmol/L  100-108   CARBON DIOXIDE 29 mmol/L  21-32   ANION GAP (CALC) 6 mmol/L  4-13   BLOOD UREA NITROGEN 17 mg/dL  5-25   CREATININE 0 81 mg/dL  0 60-1 30   Standardized to IDMS reference method   CALCIUM 8 9 mg/dL  8 3-10 1   BILI, TOTAL 0 60 mg/dL  0 20-1 00   ALK PHOSPHATAS 78 U/L     ALT (SGPT) 40 U/L  12-78   AST(SGOT) 31 U/L  5-45   ALBUMIN 3 7 g/dL  3 5-5 0   TOTAL PROTEIN 7 7 g/dL  6 4-8 2   eGFR Non-African American      >60 0 ml/min/1 73sq [facility] Disease Education Program recommendations are as follows:  GFR calculation is accurate only with a steady state creatinine  Chronic Kidney disease less than 60 ml/min/1 73 sq  meters  Kidney failure less than 15 ml/min/1 73 sq  meters

## 2018-01-18 NOTE — RESULT NOTES
Verified Results  (1) PT WITH INR [id number] 10:05AM Shireen Reddy    Order Number: [id number]     Test Name Result Flag Reference   INR 2 99 H 0 86-1 16   - Patient Instructions: INR WEEKLY OR AS ORDERED BY PHYSICIAN, ORDERS FOR MARCH 21, 2016 TO MARCH 21, 2017    - Patient Instructions: INR WEEKLY OR AS ORDERED BY PHYSICIAN, ORDERS FOR MARCH 21, 2016 TO MARCH 21, 2017   PT 30 5 seconds H 12 0-14 3

## 2018-01-18 NOTE — RESULT NOTES
Verified Results  (1) PT WITH INR [id number] 11:02AM Winifred Martinez     Test Name Result Flag Reference   INR 2 13 H 0 86-1 16   PT 22 9 seconds H 9 4-11 7

## 2018-01-18 NOTE — RESULT NOTES
Verified Results  (1) PT WITH INR [id number] 02:26PM Claudio Villarreal     Test Name Result Flag Reference   INR 1 72 H 0 86-1 16   PT 18 2 seconds H 9 4-11 7

## 2018-01-18 NOTE — RESULT NOTES
Verified Results  (1) PT WITH INR [id number] 11:26AM Damaris Bah     Test Name Result Flag Reference   INR 2 27 H 0 86-1 16   PT 24 4 seconds H 9 4-11 7

## 2018-01-20 ENCOUNTER — ANTICOAG VISIT (OUTPATIENT)
Dept: OTHER | Facility: HOSPITAL | Age: 78
End: 2018-01-20

## 2018-01-20 ENCOUNTER — APPOINTMENT (OUTPATIENT)
Dept: LAB | Facility: HOSPITAL | Age: 78
End: 2018-01-20
Payer: MEDICARE

## 2018-01-20 DIAGNOSIS — I48.0 PAROXYSMAL ATRIAL FIBRILLATION (HCC): ICD-10-CM

## 2018-01-20 DIAGNOSIS — I48.91 ATRIAL FIBRILLATION, UNSPECIFIED TYPE (HCC): ICD-10-CM

## 2018-01-20 LAB
INR PPP: 2.45 (ref 0.86–1.16)
PROTHROMBIN TIME: 26 SECONDS (ref 9.4–11.7)

## 2018-01-20 PROCEDURE — 36415 COLL VENOUS BLD VENIPUNCTURE: CPT

## 2018-01-20 PROCEDURE — 85610 PROTHROMBIN TIME: CPT

## 2018-01-22 ENCOUNTER — GENERIC CONVERSION - ENCOUNTER (OUTPATIENT)
Dept: OTHER | Facility: OTHER | Age: 78
End: 2018-01-22

## 2018-01-22 VITALS
BODY MASS INDEX: 39.27 KG/M2 | SYSTOLIC BLOOD PRESSURE: 128 MMHG | HEIGHT: 64 IN | WEIGHT: 230 LBS | DIASTOLIC BLOOD PRESSURE: 80 MMHG

## 2018-01-22 VITALS
DIASTOLIC BLOOD PRESSURE: 88 MMHG | SYSTOLIC BLOOD PRESSURE: 137 MMHG | RESPIRATION RATE: 17 BRPM | WEIGHT: 243 LBS | HEART RATE: 76 BPM | HEIGHT: 64 IN | BODY MASS INDEX: 41.48 KG/M2

## 2018-01-22 VITALS
HEART RATE: 88 BPM | HEIGHT: 64 IN | SYSTOLIC BLOOD PRESSURE: 122 MMHG | WEIGHT: 234 LBS | BODY MASS INDEX: 39.95 KG/M2 | DIASTOLIC BLOOD PRESSURE: 82 MMHG

## 2018-01-23 ENCOUNTER — ALLSCRIPTS OFFICE VISIT (OUTPATIENT)
Dept: OTHER | Facility: OTHER | Age: 78
End: 2018-01-23

## 2018-01-23 VITALS
SYSTOLIC BLOOD PRESSURE: 132 MMHG | WEIGHT: 233 LBS | BODY MASS INDEX: 39.78 KG/M2 | HEART RATE: 78 BPM | RESPIRATION RATE: 16 BRPM | DIASTOLIC BLOOD PRESSURE: 81 MMHG | HEIGHT: 64 IN

## 2018-01-23 NOTE — RESULT NOTES
Verified Results  (1) PT WITH INR [id number] 03:46PM Ana Hooker    Order Number: [id number]     Test Name Result Flag Reference   INR 3 28 H 0 86-1 16   PT 34 9 seconds H 9 4-11 7

## 2018-01-23 NOTE — PROGRESS NOTES
REPORT NAME: Progress Notes Report  VISIT DATE: 1/3/2018  VISIT TIME: 9:44 AM EST  PATIENT NAME: Janeth Oliveira  MEDICAL RECORD NUMBER: [id number]  YOB: 1940  AGE: 78  REFERRING PHYSICIAN: Rosa Ortiz  SUPERVISING CLINICIAN: [address] CARE PROVIDER: Elizabeth Yan   PATIENT HOME ADDRESS: [address] [facility] PHONE: [phone]  SOCIAL SECURITY NUMBER:   DIAGNOSIS 1: Unspecified atrial fibrillation / I48 91  DIAGNOSIS 2:   INR RANGE: 2 - 3  INR GOAL: 2 5  TREATMENT START DATE:   TREATMENT END DATE:   NEXT VISIT: 1/16/2018  [facility]  VISIT RESULTS  ENCOUNTER NUMBER:   TEST LOCATION: [facility]  TEST TYPE:   VISIT TYPE:   CURRENT INR: 3 28 PROTIME:   SPECIMEN COL AND RPT DATE: 1/3/2018 9:44 AM  EST  VITAL SIGNS  PULSE:  BP: / WEIGHT:  HEIGHT:  TEMP:   CURRENT ANTICOAGULANT DOSING SCHEDULE  DOSE SIZE: 5mg    ANTICOAGULANT TYPE: WARFARIN  DOSING REGIMEN  Sun       Mon       Tues      Wed       Thurs     Fri       Sat  Total/Wk  7 5       7 5       7 5       7 5       7 5       7 5       7 5       52 5  PATIENT MEDICATION INSTRUCTION: Yes  PATIENT NUTRITIONAL COUNSELING: Yes  PATIENT BRUISING INSTRUCTION: Yes  LAST EDUCATION DATE:   PREVIOUS VISIT INFORMATION  VISITDATE  INRGoal INR   Sun    Mon    Tues   Wed    Thurs  Fri    Sat  Total/wk  1/3/2018    2 5     3 28  7 5    7 5    7 5    7 5    7 5    7 5    7 5  52 5  11/17/2017  2 5     2 4   10     7 5    7 5    7 5    10     7 5    7 5  57 5  10/19/2017  2 5     2 83  10     7 5    7 5    7 5    10     7 5    7 5  57 5  10/4/2017   2 5     1 72  10     7 5    7 5    7 5    10     7 5    7 5  57 5  ADDITIONAL PREVIOUS VISIT INFORMATION  VISITDATE   PRIMARY RX               DOSE      CrCl  1/3/2018    WARFARIN                 5mg  11/17/2017  WARFARIN                 5mg  10/19/2017  WARFARIN                 5mg  10/4/2017   WARFARIN                 5mg  OTHER CURRENT MEDICATIONS:  WARFARIN  PROGRESS NOTES: spoke to pt / w changes / 2 wks  PATIENT INSTRUCTIONS:   TEST LOCATION: [facility], , ,   INBOUND LAB DATA:  Lab       Lab Value Col Date                 Rpt Date                 Lab  Reference Range  Electronically signed byMariely Hoffman  on 1/3/2018 9:44 AM EST

## 2018-01-23 NOTE — RESULT NOTES
Verified Results  * DXA BONE DENSITY SPINE HIP AND PELVIS [id number] 10:35AM Lalit Oliva Order Number: [id number]    - Patient Instructions: To schedule this appointment, please contact Central Scheduling at [phone]  Test Name Result Flag Reference   DXA BONE DENSITY SPINE HIP AND PELVIS (Report)     DXA SCAN     CLINICAL HISTORY: 28-year-old woman  Menopause at age 41  OTHER RISK FACTORS: Pantoprazole therapy for reflux  TECHNIQUE: Bone densitometry was performed using a Hologic Horizon A bone densitometer  Regions of interest appear properly placed  COMPARISON: December 1, 2015  RESULTS:      LUMBAR SPINE L1-L4: BMD 0 841 gm/cm2 / T-score -1 9 / Z score 0 5         LEFT TOTAL HIP: BMD 1 085 gm/cm2 / T-score 1 2 / Z score 2 9   LEFT FEMORAL NECK: BMD 0 723 gm/cm2 / T score -1 1 / Z score 0 9     CHANGE: Since the last DXA:   LUMBAR SPINE BMD has decreased 0 034 gm/cm2 or 3 9%  This change is statistically significant  HIP BMD has decreased 0 029 gm/cm2 or 2 6%  This change is statistically significant  IMPRESSION:     1  Low bone mass (osteopenia)  2  Since a DXA study from December 1, 2015, there has been a statistically significant decrease in bone mineral density  3  The 10 year risk of hip fracture is 1 3% with the 10 year risk of major osteoporotic fracture being 8 8% as calculated by the [facility]/WHO fracture risk assessment tool (FRAX)  4  The current NOF guidelines recommend treating patients with a T-score of -2 5 or less in the lumbar spine or hips, or in post-menopausal women and men over the age of 48 with low bone mass (osteopenia) and a FRAX 10 year risk score of >3% for hip    fracture and/or >20% for major osteoporotic fracture       5  A daily intake of at least 1200 mg calcium and vitamin D 800- 1000 IU, as well as weight bearing and muscle strengthening exercise, fall prevention and avoidance of tobacco and excessive alcohol as preventive measurements are suggested  6  Follow-up DXA in two years is recommended for most patients  A one year follow-up DXA is recommended after initiation or change in therapy for osteoporosis  More frequent evaluation is also appropriate for patients with conditions associated with    rapid bone loss, such as glucocorticoid therapy  The FRAX tool has not been validated in patients currently or previously treated with pharmacotherapy for osteoporosis  In such patients, clinical judgment must be exercised in interpreting FRAX scores  It is not appropriate to use FRAX to monitor    treatment response  WHO CLASSIFICATION:   Normal (a T-score of -1 0 or higher)   Low bone mineral density (a T-score of less than -1 0 but higher than -2 5)   Osteoporosis (a T-score of -2 5 or less)   Severe osteoporosis (a T-score of -2 5 or less with a fragility fracture)     Least significant change (lumbar spine): 0 014 g/cm2; 1 6%   Least significant change (total hip): 0 020 g/cm2; 2 4%   Least significant change (forearm): 0 013 g/cm2; 2 6%         Workstation performed: [id number]     Signed by:   Kamron Falcon MD   12/4/17

## 2018-01-24 ENCOUNTER — CLINICAL SUPPORT (OUTPATIENT)
Dept: CARDIOLOGY CLINIC | Facility: CLINIC | Age: 78
End: 2018-01-24
Payer: MEDICARE

## 2018-01-24 DIAGNOSIS — I48.20 CHRONIC ATRIAL FIBRILLATION (HCC): Primary | ICD-10-CM

## 2018-01-24 DIAGNOSIS — Z95.0 PRESENCE OF CARDIAC PACEMAKER: ICD-10-CM

## 2018-01-24 PROCEDURE — 93296 REM INTERROG EVL PM/IDS: CPT

## 2018-01-24 PROCEDURE — 93294 REM INTERROG EVL PM/LDLS PM: CPT

## 2018-01-24 NOTE — PROGRESS NOTES
Assessment    1  Esophageal reflux (530 81) (K21 9)   2  History of Carrero's esophagus (V12 79) (Z87 19)    Plan  Esophageal reflux    · Follow-up PRN Evaluation and Treatment  Follow-up  Status: Complete  Done:  [id number]   Ordered; For: Esophageal reflux; Ordered By: Yann Atkins Performed:  Due: 05ONO8630    Discussion/Summary  Discussion Summary:   1) GERD and LPR : Controlled  - continue ranitidine    - continue MVT    2) Colon cancer screening : due tin 2020  3) Follow up as needed  Chief Complaint  Chief Complaint Free Text Note Form: pt here for follow up on acid reflux  pt states she is feeling much better  History of Present Illness  HPI: 77 y/o lady who is here for follow up of reflux  She has been doing well  She is on ranitidine 150mg once at bedtime  No symptoms of heartburn  She does have some symptoms of having to clear her throat  It is usually in the morning and then resolves in an hour  Sings at [employer] but does not lose her voice  Last EGD in 9/17 did not show any Carrero's esophagus and biopsies were unremarkable  History Reviewed: The history was obtained today from the patient and I agree with the documented history  Review of Systems  Complete-Female GI Adult:   Constitutional: No fever, no chills, feels well, no tiredness, no recent weight gain or weight loss  Eyes: No complaints of eye pain, no red eyes, no eyesight problems, no discharge, no dry eyes, no itching of eyes  ENT: no complaints of earache, no loss of hearing, no nose bleeds, no nasal discharge, no sore throat, no hoarseness  Cardiovascular: No complaints of slow heart rate, no fast heart rate, no chest pain, no palpitations, no leg claudication, no lower extremity edema  Respiratory: No complaints of shortness of breath, no wheezing, no cough, no SOB on exertion, no orthopnea, no PND  Gastrointestinal: as noted in HPI     Genitourinary: No complaints of dysuria, no incontinence, no pelvic pain, no dysmenorrhea, no vaginal discharge or bleeding  Musculoskeletal: limb pain  Integumentary: No complaints of skin rash or lesions, no itching, no skin wounds, no breast pain or lump  Neurological: No complaints of headache, no confusion, no convulsions, no numbness, no dizziness or fainting, no tingling, no limb weakness, no difficulty walking  Psychiatric: Not suicidal, no sleep disturbance, no anxiety or depression, no change in personality, no emotional problems  Endocrine: No complaints of proptosis, no hot flashes, no muscle weakness, no deepening of the voice, no feelings of weakness  Hematologic/Lymphatic: No complaints of swollen glands, no swollen glands in the neck, does not bleed easily, does not bruise easily  ROS Reviewed:   ROS reviewed  Active Problems    1  Achilles tendinitis of left lower extremity (726 71) (M76 62)   2  Acquired ankle/foot deformity (736 70) (M21 969)   3  AF (paroxysmal atrial fibrillation) (427 31) (I48 0)   4  Anticoagulant long-term use (V58 61) (Z79 01)   5  Arthritis (716 90) (M19 90)   6  At risk for bone density loss (V49 89) (Z91 89)   7  Atrial fibrillation (427 31) (I48 91)   8  History of Breast Cancer (V10 3)   9  Dense breast tissue on mammogram (793 89) (R92 2)   10  Difficulty walking (719 7) (R26 2)   11  Encounter for screening mammogram for breast cancer (V76 12) (Z12 31)   12  Esophageal reflux (530 81) (K21 9)   13  Foot pain, bilateral (729 5) (M79 671,M79 672)   14  Hiatal hernia (553 3) (K44 9)   15  History of Carrero's esophagus (V12 79) (Z87 19)   16  History of fall (V15 88) (Z91 81)   17  Hypertension (401 9) (I10)   18  Knee pain, left (719 46) (M25 562)   19  Leg pain, left (729 5) (M79 605)   20  Lichen sclerosus et atrophicus (701 0) (L90 0)   21  Lumbar radiculopathy (724 4) (M54 16)   22  Multiple lung nodules (793 19) (R91 8)   23  Obesity (278 00) (E66 9)   24  Onychomycosis (110 1) (B35 1)   25   Osteoarthritis of left knee (715 96) (M17 12)   26  Osteopenia (733 90) (M85 80)   27  Pacemaker (V45 01) (Z95 0)   28  Peripheral neuropathy (356 9) (G62 9)   29  Pes planus of both feet (734) (M21 41,M21 42)   30  Plantar fasciitis of left foot (728 71) (M72 2)   31  Restless legs syndrome (333 94) (G25 81)    Past Medical History    1  History of Abnormal glucose (790 29) (R73 09)   2  Atrial fibrillation (427 31) (I48 91)   3  History of Breast Cancer (V10 3)   4  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)   5  Esophageal reflux (530 81) (K21 9)   6  Denied: History of Exposure To STD   7  History of Gross hematuria (599 71) (R31 0)   8  History of Hematoma (924 9) (T14 8XXA)   9  History of Hematoma of lower extremity, right, initial encounter (924 5) (S80 11XA)   10  History of backache (V13 59) (Z87 39)   11  History of Carrero's esophagus (V12 79) (Z87 19)   12  History of cataract (V12 49) (Z86 69)   13  History of chest pain (V13 89) (Z87 898)   14  History of fall (V15 88) (Z91 81)   15  History of hematuria (V13 09) (Z87 448)   16  History of postmenopausal hormone replacement therapy (V87 49) (Z92 29)   17  History of shortness of breath (V13 89) (Z87 898)   18  History of urinary incontinence (V13 09) (Z87 898)   19  History of Neck pain (723 1) (M54 2)   20  Normal delivery (650) (O80,Z37 9)   21  History of Right knee pain (719 46) (M25 561)   22  History of Ringing In The Ears (Tinnitus) (388 30)   23  History of Skin rash (782 1) (R21)   24  History of Traumatic blister of right lower extremity, initial encounter (916 2) (S80 821A)   25  History of UTI (lower urinary tract infection) (599 0) (N39 0)  Active Problems And Past Medical History Reviewed: The active problems and past medical history were reviewed and updated today  Surgical History    1  Denied: History of Abnormal Pap Smear Of Cervix   2  History of Breast Surgery Lumpectomy   3  History of Cataract Surgery   4   History of Diagnostic Cystoscopy   5  History of Excision Lesion Of Meniscus Or Capsule Knee   6  History of Pacemaker - Pulse Generator Replacement   7  History of Pacemaker Placement   8  History of Pacemaker Placement   9  History of Radiation Therapy   10  History of Total Abdominal Hysterectomy With Removal Of Both Ovaries  Surgical History Reviewed: The surgical history was reviewed and updated today  Family History  Mother    1  Denied: Family history of Alcoholism and drug addiction in family   2  Denied: Family history of Anxiety and depression  Father    3  Denied: Family history of Alcoholism and drug addiction in family   4  Denied: Family history of Anxiety and depression   5  Family history of Coronary Artery Disease (V17 49)   6  Family history of abdominal aortic aneurysm (V17 49) (Z82 49)  Child    7  Denied: Family history of Alcoholism and drug addiction in family   6  Denied: Family history of Anxiety and depression  Sibling    9  Denied: Family history of Alcoholism and drug addiction in family   8  Denied: Family history of Anxiety and depression  Sister    6  Family history of Breast Cancer (V16 3)  Maternal Aunt    12  Family history of Colon Cancer (V16 0)   13  Family history of Colon Cancer (V16 0)  Family History    14  Denied: Family history of Diabetes Mellitus   15  Denied: Family history of Stroke Syndrome  Family History Reviewed: The family history was reviewed and updated today  Social History    · Being A Social Drinker   · Denied: History of Drug Use   · Former smoker (V15 82) (N33 355)   · Marital History - Currently    · Retired From Work  Social History Reviewed: The social history was reviewed and updated today  Current Meds   1  Clobetasol Propionate 0 05 % External Ointment; APPLY TO AFFECTED AREA(S)  TWICE   WEEKLY; Therapy: 15IQU1724 to (Evaluate:18Mar2018)  Requested for: 31CCF7210; Last   Rx:31Zxm8078 Ordered   2   Gabapentin 800 MG Oral Tablet; TAKE 1 TABLET 4 TIMES DAILY; Therapy: 28OYL6516 to Recorded   3  Lisinopril 40 MG Oral Tablet; TAKE ONE TABLET BY MOUTH EVERY DAY AS DIRECTED; Therapy: 42SBO2257 to (Evaluate:22Mar2018)  Requested for: 27Mar2017; Last   Rx:27Mar2017 Ordered   4  Metoprolol Tartrate 50 MG Oral Tablet; Take 1 tablet by mouth  twice a day; Therapy: 50THR4832 to (Evaluate:77Hbe5778)  Requested for: 13MKS2875; Last   Rx:51Mxx7468 Ordered   5  Multivitamins CAPS; TAKE 1 CAPSULE DAILY; Therapy: (Recorded:11Mar2014) to Recorded   6  Pramipexole Dihydrochloride 0 5 MG Oral Tablet; TAKE 1 5 TABLETS Bedtime; Therapy: 34HVI7635 to (Last Rx:84Lqe6172) Ordered   7  Probiotic Oral Packet; Therapy: 44BEZ8148 to Recorded   8  RaNITidine HCl - 150 MG Oral Tablet; TAKE 1 TABLET AT BEDTIME; Therapy: 53MFO9444 to 0389 2070389)  Requested for: 03NMR8712; Last   Rx:30Oct2017 Ordered   9  Savella 50 MG Oral Tablet; one tab po bid; Therapy: 70YHG8371 to (Evaluate:05Mar2018); Last Rx:96Xdn7432 Ordered   10  Savella 50 MG Oral Tablet; TAKE ONE TAB TWICE DAILY; Therapy: 82AZE3962 to (Last Rx:20Nov2017)  Requested for: 20Nov2017 Ordered   11  Savella 50 MG Oral Tablet; Therapy: 14FMA3773 to Recorded   12  Savella Titration Pack 12 5 & 25 & 50 MG Oral Miscellaneous; as directed; Therapy: 50CFU2513 to (Evaluate:10Oct2017)  Requested for: 55MRK7438; Last    Rx:05Kpw9049 Ordered   13  Warfarin Sodium 5 MG Oral Tablet; Take 1 5 to 2 tabs daily or as ordered by physician; Therapy: 79HCR2318 to (Evaluate:64Wel5647)  Requested for: 94ILD0467; Last    Rx:19Uzy2343 Ordered  Medication List Reviewed: The medication list was reviewed and updated today  Allergies    1  duloxetine   2  LevoFLOXacin TABS   3  Cephalexin CAPS   4  Erythromycin Base TABS   5  Keflex TABS   6  Penicillins   7   Sulfa Drugs    Vitals  Vital Signs    Recorded: 82FMJ5043 01:14PM   Temperature 96 9 F   Heart Rate 89   Systolic 476   Diastolic 419   Height 5 ft 4 in   Weight 234 lb 0 2 oz   BMI Calculated 40 17   BSA Calculated 2 09   O2 Saturation 97     Physical Exam    Constitutional   General appearance: No acute distress, well appearing and well nourished  Eyes   Conjunctiva and lids: No swelling, erythema or discharge  Pupils and irises: Equal, round and reactive to light  Ears, Nose, Mouth, and Throat   External inspection of ears and nose: Normal     Oropharynx: Normal with no erythema, edema, exudate or lesions  Pulmonary   Respiratory effort: No increased work of breathing or signs of respiratory distress  Auscultation of lungs: Clear to auscultation  Cardiovascular   Auscultation of heart: Normal rate and rhythm, normal S1 and S2, without murmurs  Examination of extremities for edema and/or varicosities: Normal     Abdomen   Abdomen: Non-tender, no masses  Liver and spleen: No hepatomegaly or splenomegaly  Lymphatic   Palpation of lymph nodes in neck: No lymphadenopathy  Musculoskeletal   Gait and station: Normal     Skin   Skin and subcutaneous tissue: Normal without rashes or lesions  Neurologic   Cranial nerves: Cranial nerves 2-12 intact  Psychiatric   Orientation to person, place, and time: Normal          Future Appointments    Date/Time Provider Specialty Site   01/23/2018 03:20 PM LAUREL Esquivel  Cardiology [address]   01/24/2018 02:00 PM Cardiology, Device Remote  [facility] [address]   04/25/2018 08:30 AM Cardiology, Device Remote  ST [facility] [address]   03/12/2018 11:00 AM Denia Gupta MD Internal Medicine [address] [facility] INTERNAL MED   02/13/2018 02:45 PM Casey Herron DPM Podiatry [facility]   02/07/2018 01:45 PM LAUREL Keating   Neurology NEUROLOGY [facility]   Electronically signed by : Jamey Landrum MD; Jan 23 2018  2:14PM EST                       (Author)

## 2018-01-24 NOTE — CONSULTS
Chief Complaint  pt is here for a follow up  pt c/o sob on exertion  EKG done today  History of Present Illness  followup for afib    DOing well  No chest pain, no dyspnea, no palps  Tolerating medical therapy  No bleeding on coumadin  The patient presents with permanent atrial fibrillation  The treatment strategy for this patient is rate control  She states her atrial fibrillation has been stable since the last visit  Symptoms: denies palpitations, denies chest pain, denies exercise intolerance, denies dyspnea on exertion and denies dizziness  Associated symptoms include no syncope, no focal neurologic deficit, no tendency for easy bleeding and no tendency for easy bruising  Monitoring: The patient has regular PT/INR checks  Medications: the patient is adherent with her medication regimen  She denies medication side effects  Review of Systems      Cardiac: rhythm problems, but no chest pain, no fainting/blackouts and no palpitations present  Skin: No complaints of nonhealing sores or skin rash  Genitourinary: No complaints of recurrent urinary tract infections, frequent urination at night, difficult urination, blood in urine, kidney stones, loss of bladder control, kidney problems, denies any birth control or hormone replacement, is not post menopausal, not currently pregnant  Psychological: No complaints of feeling depressed, anxiety, panic attacks, or difficulty concentrating  General: No complaints of trouble sleeping, lack of energy, fatigue, appetite changes, weight changes, fever, frequent infections, or night sweats  Respiratory: No complaints of shortness of breath, cough with sputum, or wheezing  HEENT: No complaints of serious problems, hearing problems, nose problems, throat problems, or snoring  Gastrointestinal: No complaints of liver problems, nausea, vomiting, heartburn, constipation, bloody stools, diarrhea, problems swallowing, adbominal pain, or rectal bleeding  Hematologic: No complaints of bleeding disorders, anemia, blood clots, or excessive brusing  Neurological: No complaints of numbness, tingling, dizziness, weakness, seizures, headaches, syncope or fainting, AM fatigue, daytime sleepiness, no witnessed apnea episodes  Musculoskeletal: No complaints of arthritis, back pain, or painfull swelling  ROS reviewed  Active Problems    1  Achilles tendinitis of left lower extremity (726 71) (M76 62)   2  Acquired ankle/foot deformity (736 70) (M21 969)   3  AF (paroxysmal atrial fibrillation) (427 31) (I48 0)   4  Anticoagulant long-term use (V58 61) (Z79 01)   5  Arthritis (716 90) (M19 90)   6  At risk for bone density loss (V49 89) (Z91 89)   7  Atrial fibrillation (427 31) (I48 91)   8  History of Breast Cancer (V10 3)   9  Dense breast tissue on mammogram (793 89) (R92 2)   10  Difficulty walking (719 7) (R26 2)   11  Encounter for screening mammogram for breast cancer (V76 12) (Z12 31)   12  Esophageal reflux (530 81) (K21 9)   13  Foot pain, bilateral (729 5) (M79 671,M79 672)   14  Hiatal hernia (553 3) (K44 9)   15  History of Carrero's esophagus (V12 79) (Z87 19)   16  History of fall (V15 88) (Z91 81)   17  Hypertension (401 9) (I10)   18  Knee pain, left (719 46) (M25 562)   19  Leg pain, left (729 5) (M79 605)   20  Lichen sclerosus et atrophicus (701 0) (L90 0)   21  Lumbar radiculopathy (724 4) (M54 16)   22  Multiple lung nodules (793 19) (R91 8)   23  Obesity (278 00) (E66 9)   24  Onychomycosis (110 1) (B35 1)   25  Osteoarthritis of left knee (715 96) (M17 12)   26  Osteopenia (733 90) (M85 80)   27  Pacemaker (V45 01) (Z95 0)   28  Peripheral neuropathy (356 9) (G62 9)   29  Pes planus of both feet (734) (M21 41,M21 42)   30  Plantar fasciitis of left foot (728 71) (M72 2)   31   Restless legs syndrome (333 94) (G25 81)    Past Medical History    · History of Abnormal glucose (790 29) (R73 09)   · Atrial fibrillation (427 31) (I48 91)   · History of Breast Cancer (V10 3)   · 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)   · Esophageal reflux (530 81) (K21 9)   · Denied: History of Exposure To STD   · History of Gross hematuria (599 71) (R31 0)   · History of Hematoma (924 9) (T14 8XXA)   · History of Hematoma of lower extremity, right, initial encounter (924 5) (S80 11XA)   · History of backache (V13 59) (Z87 39)   · History of Carrero's esophagus (V12 79) (Z87 19)   · History of cataract (V12 49) (Z86 69)   · History of chest pain (V13 89) (U40 529)   · History of fall (V15 88) (Z91 81)   · History of hematuria (V13 09) (Z87 448)   · History of postmenopausal hormone replacement therapy (V87 49) (Z92 29)   · History of shortness of breath (V13 89) (F47 233)   · History of urinary incontinence (V13 09) (Z87 898)   · History of Neck pain (723 1) (M54 2)   · Normal delivery (650) (O80,Z37  9)   · History of Right knee pain (719 46) (M25 561)   · History of Ringing In The Ears (Tinnitus) (388 30)   · History of Skin rash (782 1) (R21)   · History of Traumatic blister of right lower extremity, initial encounter (916 2) (G92 057E)   · History of UTI (lower urinary tract infection) (599 0) (N39 0)    The active problems and past medical history were reviewed and updated today  Surgical History    · Denied: History of Abnormal Pap Smear Of Cervix   · History of Breast Surgery Lumpectomy   · History of Cataract Surgery   · History of Diagnostic Cystoscopy   · History of Excision Lesion Of Meniscus Or Capsule Knee   · History of Pacemaker - Pulse Generator Replacement   · History of Pacemaker Placement   · History of Pacemaker Placement   · History of Radiation Therapy   · History of Total Abdominal Hysterectomy With Removal Of Both Ovaries    The surgical history was reviewed and updated today         Family History    · Denied: Family history of Alcoholism and drug addiction in family   · Denied: Family history of Anxiety and depression    · Denied: Family history of Alcoholism and drug addiction in family   · Denied: Family history of Anxiety and depression   · Family history of Coronary Artery Disease (V17 49)   · Family history of abdominal aortic aneurysm (V17 49) (Z82 49)    · Denied: Family history of Alcoholism and drug addiction in family   · Denied: Family history of Anxiety and depression    · Denied: Family history of Alcoholism and drug addiction in family   · Denied: Family history of Anxiety and depression    · Family history of Breast Cancer (V16 3)    · Family history of Colon Cancer (V16 0)   · Family history of Colon Cancer (V16 0)    · Denied: Family history of Diabetes Mellitus   · Denied: Family history of Stroke Syndrome    The family history was reviewed and updated today  Social History    · Being A Social Drinker   · Denied: History of Drug Use   · Former smoker (V15 82) (B27 568)   · Marital History - Currently    · Retired From Work  The social history was reviewed and updated today  Current Meds   1  Clobetasol Propionate 0 05 % External Ointment; APPLY TO AFFECTED AREA(S)  TWICE   WEEKLY; Therapy: 36MTR4773 to (Evaluate:18Mar2018)  Requested for: 37BEY6170; Last   Rx:88Uau7627 Ordered   2  Gabapentin 800 MG Oral Tablet; TAKE 1 TABLET 4 TIMES DAILY; Therapy: 12NDG3299 to Recorded   3  Lisinopril 40 MG Oral Tablet; TAKE ONE TABLET BY MOUTH EVERY DAY AS DIRECTED; Therapy: 33TPZ1713 to (Evaluate:22Mar2018)  Requested for: 27Mar2017; Last   Rx:27Mar2017 Ordered   4  Metoprolol Tartrate 50 MG Oral Tablet; Take 1 tablet by mouth  twice a day; Therapy: 99JOH4576 to (Evaluate:13Dec2018)  Requested for: 09VIY0011; Last   Rx:22Oqv1738 Ordered   5  Multivitamins CAPS; TAKE 1 CAPSULE DAILY; Therapy: (Recorded:11Mar2014) to Recorded   6  Pramipexole Dihydrochloride 0 5 MG Oral Tablet; TAKE 1 5 TABLETS Bedtime; Therapy: 67UCA5050 to (Last Rx:11Whu0052) Ordered   7  Probiotic Oral Packet;    Therapy: 63TED9995 to Recorded   8  RaNITidine HCl - 150 MG Oral Tablet; TAKE 1 TABLET AT BEDTIME; Therapy: 22UVO6541 to 0389 1531360)  Requested for: 67VWP8562; Last   Rx:30Oct2017 Ordered   9  Savella 50 MG Oral Tablet; one tab po bid; Therapy: 77AEE9883 to (Evaluate:05Mar2018); Last Rx:79Bpn6236 Ordered   10  Savella 50 MG Oral Tablet; TAKE ONE TAB TWICE DAILY; Therapy: 59QOG4043 to (Last Rx:20Nov2017)  Requested for: 20Nov2017 Ordered   11  Savella 50 MG Oral Tablet; Therapy: 05ZIG1757 to Recorded   12  Savella Titration Pack 12 5 & 25 & 50 MG Oral Miscellaneous; as directed; Therapy: 79MXL3923 to (Evaluate:10Oct2017)  Requested for: 43BLP0792; Last    Rx:69Jmv3734 Ordered   13  Warfarin Sodium 5 MG Oral Tablet; Take 1 5 to 2 tabs daily or as ordered by physician; Therapy: 40MEQ8812 to (Evaluate:90Nth1211)  Requested for: 91NCF8529; Last    Rx:11Lqy3189 Ordered    The medication list was reviewed and updated today  Allergies    1  duloxetine   2  LevoFLOXacin TABS   3  Cephalexin CAPS   4  Erythromycin Base TABS   5  Keflex TABS   6  Penicillins   7  Sulfa Drugs    Vitals   Recorded: 48DZT0690 03:18PM   Heart Rate 80, R Radial   Systolic 286, LUE, Sitting   Diastolic 88, LUE, Sitting   BP CUFF SIZE Large   Height 5 ft 4 in   Weight 234 lb 9 6 oz   BMI Calculated 40 27   BSA Calculated 2 09   O2 Saturation 99     Physical Exam    Constitutional   General appearance: No acute distress, well appearing and well nourished  Eyes   Conjunctiva and Sclera examination: Conjunctiva pink, sclera anicteric  Ears, Nose, Mouth, and Throat - Oropharynx: Clear, nares are clear, mucous membranes are moist    Neck   Neck and thyroid: Normal, supple, trachea midline, no thyromegaly  Pulmonary   Respiratory effort: No increased work of breathing or signs of respiratory distress  Auscultation of lungs: Clear to auscultation, no rales, no rhonchi, no wheezing, good air movement      Cardiovascular   Auscultation of heart: Normal rate and rhythm, normal S1 and S2, no murmurs  Carotid pulses: Normal, 2+ bilaterally  Peripheral vascular exam: Normal pulses throughout, no tenderness, erythema or swelling  Pedal pulses: Normal, 2+ bilaterally  Examination of extremities for edema and/or varicosities: Normal     Abdomen   Abdomen: Non-tender and no distention  Liver and spleen: No hepatomegaly or splenomegaly  Musculoskeletal Gait and station: Normal gait  Digits and nails: Normal without clubbing or cyanosis  Inspection/palpation of joints, bones, and muscles: Normal, ROM normal     Skin - Skin and subcutaneous tissue: Normal without rashes or lesions  Skin is warm and well perfused, normal turgor  Neurologic - Cranial nerves: II - XII intact  Speech: Normal     Psychiatric - Orientation to person, place, and time: Normal  Mood and affect: Normal       Results/Data    Rhythm and rate: atrial fibrillation   ventricular rate is 108 beats per minute  QRS: the QRS is normal   T waves:   there are nonspecific ST-T wave changes  Assessment    1  AF (paroxysmal atrial fibrillation) (427 31) (I48 0)   2  Pacemaker (V45 01) (Z95 0)   3  Hypertension (401 9) (I10)    Plan  AF (paroxysmal atrial fibrillation), Atrial fibrillation    · EKG/ECG- POC; Status:Complete;   Done: 46AZD0472   Perform: In Office; WHS:17TVV4169; Last Updated By:Mary Kay Espinoza; 1/23/2018 3:18:07 PM;Ordered; For:AF (paroxysmal atrial fibrillation), Atrial fibrillation; Ordered By:Shannan Phan; Hypertension    · Follow-up visit in 6 months Evaluation and Treatment  Follow-up  Status: Complete   Done: 80FFZ3719   Ordered; For: Hypertension; Ordered By: Nelida Hernandes Performed:  Due: 53IFJ8752; Last Updated By: Suyapa Kern; 1/23/2018 3:49:43 PM    Discussion/Summary    1  Chronic Atrial Fibrillation: Overall doing well  Continue Metoprolol  Stable on Coumadin  Her resting heart rate has been stable and she checks at home       2  Hypertension: Her BP is well controlled  Continue current medical therapy  3  s/p PPM: Continue PPM checks  The patient was counseled regarding diagnostic results, instructions for management, risk factor reductions, impressions  total time of encounter was 25 minutes and 15 minutes was spent counseling        Future Appointments    Signatures   Electronically signed by : LAUREL Hernandez ; Jan 23 2018  3:51PM EST                       (Author)

## 2018-01-24 NOTE — RESULT NOTES
Verified Results  (1) PT WITH INR 20Vwj5558 11:25AM Levern Reusing   TW Order Number: [id number]     Test Name Result Flag Reference   INR 2 45 H 0 86-1 16   PT 26 0 seconds H 9 4-11 7

## 2018-01-24 NOTE — PROGRESS NOTES
Assessment   Assessed    1  AF (paroxysmal atrial fibrillation) (427 31) (I48 0)   2  Pacemaker (V45 01) (Z95 0)   3  Hypertension (401 9) (I10)    Plan   AF (paroxysmal atrial fibrillation), Atrial fibrillation    · EKG/ECG- POC; Status:Complete;   Done: 13DVB6628   Perform: In Office; A:84PPB7655; Last Updated By:Mary Kay Espinoza; 1/23/2018 3:18:07 PM;Ordered; For:AF (paroxysmal atrial fibrillation), Atrial fibrillation; Ordered By:Germania Phan; Hypertension    · Follow-up visit in 6 months Evaluation and Treatment  Follow-up  Status: Complete     Done: 40ZEI6201   Ordered; For: Hypertension; Ordered By: Suraj Hirsch Performed:  Due: 62CJY0949; Last Updated By: Vineet Prince; 1/23/2018 3:49:43 PM    Discussion/Summary   Cardiology Discussion Summary Free Text Note Form [facility]:    1  Chronic Atrial Fibrillation: Overall doing well  Continue Metoprolol  Stable on Coumadin  Her resting heart rate has been stable and she checks at home  Hypertension: Her BP is well controlled  Continue current medical therapy  s/p PPM: Continue PPM checks  Counseling Documentation With Imm: The patient was counseled regarding diagnostic results,-- instructions for management,-- risk factor reductions,-- impressions  total time of encounter was 25 minutes-- and-- 15 minutes was spent counseling  Chief Complaint   Chief Complaint Free Text Note Form: pt is here for a follow up  pt c/o sob on exertion  EKG done today  History of Present Illness   Cardiology HPI Free Text Note Form [facility]: followup for afib   well  No chest pain, no dyspnea, no palps  Tolerating medical therapy  No bleeding on coumadin  Atrial Fibrillation (Follow-Up): The patient presents with permanent atrial fibrillation  The treatment strategy for this patient is rate control  She states her atrial fibrillation has been stable since the last visit      Symptoms: denies palpitations,-- denies chest pain,-- denies exercise intolerance,-- denies dyspnea on exertion-- and-- denies dizziness  Associated symptoms include no syncope,-- no focal neurologic deficit,-- no tendency for easy bleeding-- and-- no tendency for easy bruising  Monitoring: The patient has regular PT/INR checks  Medications: the patient is adherent with her medication regimen  -- She denies medication side effects  Review of Systems   Cardiology Female ROS:         Cardiac: rhythm problems, but-- no chest pain,-- no fainting/blackouts-- and-- no palpitations present  Skin: No complaints of nonhealing sores or skin rash  Genitourinary: No complaints of recurrent urinary tract infections, frequent urination at night, difficult urination, blood in urine, kidney stones, loss of bladder control, kidney problems, denies any birth control or hormone replacement, is not post menopausal, not currently pregnant  Psychological: No complaints of feeling depressed, anxiety, panic attacks, or difficulty concentrating  General: No complaints of trouble sleeping, lack of energy, fatigue, appetite changes, weight changes, fever, frequent infections, or night sweats  Respiratory: No complaints of shortness of breath, cough with sputum, or wheezing  HEENT: No complaints of serious problems, hearing problems, nose problems, throat problems, or snoring  Gastrointestinal: No complaints of liver problems, nausea, vomiting, heartburn, constipation, bloody stools, diarrhea, problems swallowing, adbominal pain, or rectal bleeding  Hematologic: No complaints of bleeding disorders, anemia, blood clots, or excessive brusing  Neurological: No complaints of numbness, tingling, dizziness, weakness, seizures, headaches, syncope or fainting, AM fatigue, daytime sleepiness, no witnessed apnea episodes  Musculoskeletal: No complaints of arthritis, back pain, or painfull swelling  ROS Reviewed:    ROS reviewed  Active Problems   Problems    1   Achilles tendinitis of left lower extremity (726 71) (M76 62)   2  Acquired ankle/foot deformity (736 70) (M21 969)   3  AF (paroxysmal atrial fibrillation) (427 31) (I48 0)   4  Anticoagulant long-term use (V58 61) (Z79 01)   5  Arthritis (716 90) (M19 90)   6  At risk for bone density loss (V49 89) (Z91 89)   7  Atrial fibrillation (427 31) (I48 91)   8  History of Breast Cancer (V10 3)   9  Dense breast tissue on mammogram (793 89) (R92 2)   10  Difficulty walking (719 7) (R26 2)   11  Encounter for screening mammogram for breast cancer (V76 12) (Z12 31)   12  Esophageal reflux (530 81) (K21 9)   13  Foot pain, bilateral (729 5) (M79 671,M79 672)   14  Hiatal hernia (553 3) (K44 9)   15  History of Carrero's esophagus (V12 79) (Z87 19)   16  History of fall (V15 88) (Z91 81)   17  Hypertension (401 9) (I10)   18  Knee pain, left (719 46) (M25 562)   19  Leg pain, left (729 5) (M79 605)   20  Lichen sclerosus et atrophicus (701 0) (L90 0)   21  Lumbar radiculopathy (724 4) (M54 16)   22  Multiple lung nodules (793 19) (R91 8)   23  Obesity (278 00) (E66 9)   24  Onychomycosis (110 1) (B35 1)   25  Osteoarthritis of left knee (715 96) (M17 12)   26  Osteopenia (733 90) (M85 80)   27  Pacemaker (V45 01) (Z95 0)   28  Peripheral neuropathy (356 9) (G62 9)   29  Pes planus of both feet (734) (M21 41,M21 42)   30  Plantar fasciitis of left foot (728 71) (M72 2)   31  Restless legs syndrome (333 94) (G25 81)    Past Medical History   Problems    1  History of Abnormal glucose (790 29) (R73 09)   2  Atrial fibrillation (427 31) (I48 91)   3  History of Breast Cancer (V10 3)   4  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)   5  Esophageal reflux (530 81) (K21 9)   6  Denied: History of Exposure To STD   7  History of Gross hematuria (599 71) (R31 0)   8  History of Hematoma (924 9) (T14 8XXA)   9  History of Hematoma of lower extremity, right, initial encounter (924 5) (S80 11XA)   10   History of backache (V13 59) (Z87 39) 11  History of Carrero's esophagus (V12 79) (Z87 19)   12  History of cataract (V12 49) (Z86 69)   13  History of chest pain (V13 89) (Z87 898)   14  History of fall (V15 88) (Z91 81)   15  History of hematuria (V13 09) (Z87 448)   16  History of postmenopausal hormone replacement therapy (V87 49) (Z92 29)   17  History of shortness of breath (V13 89) (Z87 898)   18  History of urinary incontinence (V13 09) (Z87 898)   19  History of Neck pain (723 1) (M54 2)   20  Normal delivery (650) (O80,Z37 9)   21  History of Right knee pain (719 46) (M25 561)   22  History of Ringing In The Ears (Tinnitus) (388 30)   23  History of Skin rash (782 1) (R21)   24  History of Traumatic blister of right lower extremity, initial encounter (916 2) (S80 821A)   25  History of UTI (lower urinary tract infection) (599 0) (N39 0)  Active Problems And Past Medical History Reviewed: The active problems and past medical history were reviewed and updated today  Surgical History   Problems    1  Denied: History of Abnormal Pap Smear Of Cervix   2  History of Breast Surgery Lumpectomy   3  History of Cataract Surgery   4  History of Diagnostic Cystoscopy   5  History of Excision Lesion Of Meniscus Or Capsule Knee   6  History of Pacemaker - Pulse Generator Replacement   7  History of Pacemaker Placement   8  History of Pacemaker Placement   9  History of Radiation Therapy   10  History of Total Abdominal Hysterectomy With Removal Of Both Ovaries  Surgical History Reviewed: The surgical history was reviewed and updated today  Family History   Mother    1  Denied: Family history of Alcoholism and drug addiction in family   2  Denied: Family history of Anxiety and depression  Father    3  Denied: Family history of Alcoholism and drug addiction in family   4  Denied: Family history of Anxiety and depression   5  Family history of Coronary Artery Disease (V17 49)   6   Family history of abdominal aortic aneurysm (V17 49) (Z82 49)  Child    7  Denied: Family history of Alcoholism and drug addiction in family   6  Denied: Family history of Anxiety and depression  Sibling    9  Denied: Family history of Alcoholism and drug addiction in family   8  Denied: Family history of Anxiety and depression  Sister    6  Family history of Breast Cancer (V16 3)  Maternal Aunt    12  Family history of Colon Cancer (V16 0)   13  Family history of Colon Cancer (V16 0)  Family History    14  Denied: Family history of Diabetes Mellitus   15  Denied: Family history of Stroke Syndrome  Family History Reviewed: The family history was reviewed and updated today  Social History   Problems    · Being A Social Drinker   · Denied: History of Drug Use   · Former smoker (V15 82) (O49 085)   · Marital History - Currently    · Retired From Work  Social History Reviewed: The social history was reviewed and updated today  Current Meds    1  Clobetasol Propionate 0 05 % External Ointment; APPLY TO AFFECTED AREA(S)  TWICE     WEEKLY; Therapy: 96CRB3176 to (Evaluate:18Mar2018)  Requested for: 14QJA4525; Last     Rx:60Tgh4105 Ordered   2  Gabapentin 800 MG Oral Tablet; TAKE 1 TABLET 4 TIMES DAILY; Therapy: 90BYB5162 to Recorded   3  Lisinopril 40 MG Oral Tablet; TAKE ONE TABLET BY MOUTH EVERY DAY AS DIRECTED; Therapy: 36LVE0714 to (Evaluate:22Mar2018)  Requested for: 27Mar2017; Last     Rx:27Mar2017 Ordered   4  Metoprolol Tartrate 50 MG Oral Tablet; Take 1 tablet by mouth  twice a day; Therapy: 81MPX4192 to (Evaluate:95Hsn1427)  Requested for: 78LUD0083; Last     Rx:68Uuw7369 Ordered   5  Multivitamins CAPS; TAKE 1 CAPSULE DAILY; Therapy: (Recorded:11Mar2014) to Recorded   6  Pramipexole Dihydrochloride 0 5 MG Oral Tablet; TAKE 1 5 TABLETS Bedtime; Therapy: 50HCQ5362 to (Last Rx:32Sjc7476) Ordered   7  Probiotic Oral Packet; Therapy: 41TEC3708 to Recorded   8   RaNITidine HCl - 150 MG Oral Tablet; TAKE 1 TABLET AT BEDTIME; Therapy: 52FQI0341 to Dennisgregory Watsonuser)  Requested for: 09UWR5952; Last     Rx:46Tpj5111 Ordered   9  Savella 50 MG Oral Tablet; one tab po bid; Therapy: 40LYX1418 to (Evaluate:05Mar2018); Last Rx:33Gnp5969 Ordered   10  Savella 50 MG Oral Tablet; TAKE ONE TAB TWICE DAILY; Therapy: 89ROQ2471 to (Last Rx:20Nov2017)  Requested for: 20Nov2017 Ordered   11  Savella 50 MG Oral Tablet; Therapy: 57FNN7024 to Recorded   12  Savella Titration Pack 12 5 & 25 & 50 MG Oral Miscellaneous; as directed; Therapy: 70NNT5481 to (Evaluate:10Oct2017)  Requested for: 74GQM1173; Last      Rx:30Zqr8656 Ordered   13  Warfarin Sodium 5 MG Oral Tablet; Take 1 5 to 2 tabs daily or as ordered by physician; Therapy: 40WHD7223 to (Evaluate:03Mlo9293)  Requested for: 91SOB8666; Last      Rx:47Evo2833 Ordered  Medication List Reviewed: The medication list was reviewed and updated today  Allergies   Medication    1  duloxetine   2  LevoFLOXacin TABS   3  Cephalexin CAPS   4  Erythromycin Base TABS   5  Keflex TABS   6  Penicillins   7  Sulfa Drugs    Vitals   Vital Signs    Recorded: 64RGN2655 03:18PM   Heart Rate 80, R Radial   Systolic 308, LUE, Sitting   Diastolic 88, LUE, Sitting   BP CUFF SIZE Large   Height 5 ft 4 in   Weight 234 lb 9 6 oz   BMI Calculated 40 27   BSA Calculated 2 09   O2 Saturation 99     Physical Exam        Constitutional      General appearance: No acute distress, well appearing and well nourished  Eyes      Conjunctiva and Sclera examination: Conjunctiva pink, sclera anicteric  Ears, Nose, Mouth, and Throat - Oropharynx: Clear, nares are clear, mucous membranes are moist       Neck      Neck and thyroid: Normal, supple, trachea midline, no thyromegaly  Pulmonary      Respiratory effort: No increased work of breathing or signs of respiratory distress  Auscultation of lungs: Clear to auscultation, no rales, no rhonchi, no wheezing, good air movement  Cardiovascular      Auscultation of heart: Normal rate and rhythm, normal S1 and S2, no murmurs  Carotid pulses: Normal, 2+ bilaterally  Peripheral vascular exam: Normal pulses throughout, no tenderness, erythema or swelling  Pedal pulses: Normal, 2+ bilaterally  Examination of extremities for edema and/or varicosities: Normal        Abdomen      Abdomen: Non-tender and no distention  Liver and spleen: No hepatomegaly or splenomegaly  Musculoskeletal Gait and station: Normal gait  -- Digits and nails: Normal without clubbing or cyanosis  -- Inspection/palpation of joints, bones, and muscles: Normal, ROM normal        Skin - Skin and subcutaneous tissue: Normal without rashes or lesions  Skin is warm and well perfused, normal turgor  Neurologic - Cranial nerves: II - XII intact  -- Speech: Normal        Psychiatric - Orientation to person, place, and time: Normal -- Mood and affect: Normal       Results/Data   ECG Report:      Rhythm and rate: atrial fibrillation--   ventricular rate is 108 beats per minute  QRS: the QRS is normal      T waves:   there are nonspecific ST-T wave changes  Health Management   History of Benign colon polyp   COLONOSCOPY (GI, SURG); every 3 years; Last 16GSZ4846; Next Due: 40Qpc3436; Active    Future Appointments      Date/Time Provider Specialty Site   01/24/2018 02:00 PM Cardiology, Device Remote   [address]   04/25/2018 08:30 AM Cardiology, Device Remote   [address]   03/12/2018 11:00 AM Joshua Gupta MD Internal Medicine [facility] INTERNAL MED   02/13/2018 02:45 PM Esha Diallo DPM Podiatry [facility]   02/07/2018 01:45 PM LAUREL Mendez   Neurology NEUROLOGY [facility]    Electronically signed by : LAUREL Burks ; Jan 23 2018  3:51PM EST                       (Author)

## 2018-01-24 NOTE — PROGRESS NOTES
REPORT NAME: Progress Notes Report  VISIT DATE: 1/22/2018  VISIT TIME: 9:01 AM EST  PATIENT NAME: Josue Landrum  MEDICAL RECORD NUMBER: [id number]  YOB: 1940  AGE: 78  REFERRING PHYSICIAN: Guilherme Gibson  SUPERVISING CLINICIAN: [address] CARE PROVIDER: Lupe Fischer   PATIENT HOME ADDRESS: [address] [place], [place] [address] [facility] PHONE: [phone]  SOCIAL SECURITY NUMBER:   DIAGNOSIS 1: Unspecified atrial fibrillation / I48 91  DIAGNOSIS 2:   INR RANGE: 2 - 3  INR GOAL: 2 5  TREATMENT START DATE:   TREATMENT END DATE:   NEXT VISIT: 2/5/2018  Kaykay Rivero Cardiology  VISIT RESULTS  ENCOUNTER NUMBER:   TEST LOCATION: [facility]  TEST TYPE:   VISIT TYPE:   CURRENT INR: 2 45 PROTIME:   SPECIMEN COL AND RPT DATE: 1/22/2018 9:01 AM  EST  VITAL SIGNS  PULSE:  BP: / WEIGHT:  HEIGHT:  TEMP:   CURRENT ANTICOAGULANT DOSING SCHEDULE  DOSE SIZE: 5mg    ANTICOAGULANT TYPE: WARFARIN  DOSING REGIMEN  Sun       Mon       Tues      Wed       Thurs     Fri       Sat  Total/Wk  7 5       7 5       7 5       7 5       7 5       7 5       7 5       52 5  PATIENT MEDICATION INSTRUCTION: Yes  PATIENT NUTRITIONAL COUNSELING: Yes  PATIENT BRUISING INSTRUCTION: Yes  LAST EDUCATION DATE:   PREVIOUS VISIT INFORMATION  VISITDATE  INRGoal INR   Sun    Mon    Tues   Wed    Thurs  Fri    Sat  Total/wk  1/22/2018   2 5     2 45  7 5    7 5    7 5    7 5    7 5    7 5    7 5  52 5  1/3/2018    2 5     3 28  7 5    7 5    7 5    7 5    7 5    7 5    7 5  52 5  11/17/2017  2 5     2 4   10     7 5    7 5    7 5    10     7 5    7 5  57 5  10/19/2017  2 5     2 83  10     7 5    7 5    7 5    10     7 5    7 5  57 5  ADDITIONAL PREVIOUS VISIT INFORMATION  VISITDATE   PRIMARY RX               DOSE      CrCl  1/22/2018   WARFARIN                 5mg  1/3/2018    WARFARIN                 5mg  11/17/2017  WARFARIN                 5mg  10/19/2017  WARFARIN                 5mg  OTHER CURRENT MEDICATIONS:  WARFARIN  PROGRESS NOTES: spoke to pt / no changes / 2 wks  PATIENT INSTRUCTIONS:   TEST LOCATION: [facility], , ,   INBOUND LAB DATA:  Lab       Lab Value Col Date                 Rpt Date                 Lab  Reference Range  Electronically signed byArmy Gautam  on 1/22/2018 9:01 AM EST

## 2018-01-25 ENCOUNTER — TELEPHONE (OUTPATIENT)
Dept: OBGYN CLINIC | Facility: HOSPITAL | Age: 78
End: 2018-01-25

## 2018-01-29 NOTE — PROGRESS NOTES
CARELINK TRANSMISSION:  BATTERY VOLTAGE ADEQUATE (5 5 YR)    15 8%   ALL AVAILABLE LEAD PARAMETERS WITHIN NORMAL LIMITS   12,360 VHR EPISODES WITH 2 AVAILABLE EGMS SHOWING PROBABLE RVR (LONGEST ~ 12 MIN  )   PT TAKES WARFARIN AND METOPROLOL TART    EF 65% (ECHO 07/2017)   NORMAL DEVICE FUNCTION   RG    Current Outpatient Prescriptions:     Calcium Acetate, Phos Binder, (CALCIUM ACETATE PO), Take by mouth, Disp: , Rfl:     clobetasol (TEMOVATE) 0 05 % cream, Apply topically 2 (two) times a week, Disp: , Rfl:     gabapentin (NEURONTIN) 800 mg tablet, Take 800 mg by mouth 4 (four) times a day, Disp: , Rfl:     lisinopril (ZESTRIL) 40 mg tablet, Take 40 mg by mouth daily, Disp: , Rfl:     metoprolol tartrate (LOPRESSOR) 50 mg tablet, Take 50 mg by mouth 2 (two) times a day, Disp: , Rfl:     multivitamin (THERAGRAN) TABS, Take 1 tablet by mouth daily, Disp: , Rfl:     pantoprazole (PROTONIX) 40 mg tablet, Take 40 mg by mouth daily, Disp: , Rfl:     PRAMIPEXOLE DIHYDROCHLORIDE PO, Take 5 mg by mouth 2 (two) times a day 1 1/2 tabs x 2 daily, Disp: , Rfl:     traMADol (ULTRAM) 50 mg tablet, Take 1 tablet by mouth every 6 (six) hours as needed for moderate pain for up to 15 doses, Disp: 15 tablet, Rfl: 0    warfarin (COUMADIN) 5 mg tablet, Take 7 5 mg by mouth daily  , Disp: , Rfl:

## 2018-02-04 DIAGNOSIS — I10 HTN (HYPERTENSION): Primary | ICD-10-CM

## 2018-02-05 ENCOUNTER — OFFICE VISIT (OUTPATIENT)
Dept: OBGYN CLINIC | Facility: CLINIC | Age: 78
End: 2018-02-05
Payer: MEDICARE

## 2018-02-05 VITALS
HEART RATE: 111 BPM | SYSTOLIC BLOOD PRESSURE: 143 MMHG | HEIGHT: 64 IN | DIASTOLIC BLOOD PRESSURE: 81 MMHG | BODY MASS INDEX: 40.19 KG/M2 | WEIGHT: 235.4 LBS

## 2018-02-05 DIAGNOSIS — M17.0 PRIMARY OSTEOARTHRITIS OF BOTH KNEES: Primary | ICD-10-CM

## 2018-02-05 PROCEDURE — 20610 DRAIN/INJ JOINT/BURSA W/O US: CPT | Performed by: ORTHOPAEDIC SURGERY

## 2018-02-05 RX ORDER — HYALURONATE SODIUM 10 MG/ML
20 SYRINGE (ML) INTRAARTICULAR
Status: COMPLETED | OUTPATIENT
Start: 2018-02-05 | End: 2018-02-05

## 2018-02-05 RX ORDER — LISINOPRIL 40 MG/1
TABLET ORAL
Qty: 90 TABLET | Refills: 3 | Status: SHIPPED | OUTPATIENT
Start: 2018-02-05 | End: 2019-02-21 | Stop reason: SDUPTHER

## 2018-02-05 RX ORDER — RANITIDINE 150 MG/1
1 TABLET ORAL AS NEEDED
COMMUNITY
Start: 2017-10-30 | End: 2018-11-27

## 2018-02-05 RX ADMIN — Medication 20 MG: at 10:17

## 2018-02-05 RX ADMIN — Medication 20 MG: at 10:18

## 2018-02-05 NOTE — PROGRESS NOTES
Large joint arthrocentesis  Date/Time: 2/5/2018 10:17 AM  Consent given by: patient  Site marked: site marked  Timeout: Immediately prior to procedure a time out was called to verify the correct patient, procedure, equipment, support staff and site/side marked as required   Supporting Documentation  Indications: pain   Procedure Details  Location: knee - R knee  Preparation: Patient was prepped and draped in the usual sterile fashion  Needle size: 22 G  Ultrasound guidance: no  Approach: anterolateral  Medications administered: 20 mg Sodium Hyaluronate 20 MG/2ML    Patient tolerance: patient tolerated the procedure well with no immediate complications  Dressing:  Sterile dressing applied

## 2018-02-05 NOTE — PROGRESS NOTES
Large joint arthrocentesis  Date/Time: 2/5/2018 10:18 AM  Consent given by: patient  Site marked: site marked  Timeout: Immediately prior to procedure a time out was called to verify the correct patient, procedure, equipment, support staff and site/side marked as required   Supporting Documentation  Indications: pain   Procedure Details  Location: knee - L knee  Preparation: Patient was prepped and draped in the usual sterile fashion  Needle size: 22 G  Ultrasound guidance: no  Approach: anterolateral  Medications administered: 20 mg Sodium Hyaluronate 20 MG/2ML    Patient tolerance: patient tolerated the procedure well with no immediate complications  Dressing:  Sterile dressing applied

## 2018-02-05 NOTE — PATIENT INSTRUCTIONS
Decision Aid for Knee Osteoarthritis   AMBULATORY CARE:   What you need to know about decisions for knee osteoarthritis:  You can help make decisions about being screened for osteoarthritis  You can also help plan treatment if osteoarthritis is found with screening, or you develop it  Osteoarthritis screening is a test done to find osteoarthritis early  Screening is different from diagnosis because screening is used when you first start to have signs or symptoms  This means management or treatment can start early to help prevent joint damage  Treatments include medicine and surgery, but you can also manage osteoarthritis with lifestyle changes  What you need to know about knee osteoarthritis:   · Osteoarthritis is a condition that causes tissue between bones to wear away  This can happen slowly over time or quickly because of an injury  The bones rub together when you move  This causes pain and can limit mobility  · Osteoarthritis is the most common form of arthritis  About 30 million people in the [place] have osteoarthritis  · Osteoarthritis is more common in women than in men  Other risk factors include being overweight or having a family history of osteoarthritis  · Talk to your healthcare provider if you think you have signs or symptoms of osteoarthritis  Examples include joint pain with movement, tender or stiff joint, or a grating feeling with movement  You may also feel hard lumps around the joint, called bone spurs  How to know if you are a good candidate for osteoarthritis screening:  Screening may be helpful for you if any of the following is true:  · You are 50 years or older  · You are overweight or obese  · You do work or play a sport that puts repeated stress on your knee joint  · You have a family history of osteoarthritis  · You had a knee joint injury, even if it happened many years ago  · You were born with a joint or cartilage problem      · You have mild signs or symptoms of osteoarthritis  · Your symptoms are starting to affect your ability to do your daily activities  How osteoarthritis screening is done:  No test is used to diagnose osteoarthritis  Your healthcare provider will check the following:  · How much pain, tenderness, or inflammation you have in the joint    · Range of motion in the joint (how far it can move in every direction)    · Your ability to walk without pain, or how your symptoms changed the way you walk    · The strength of muscles around the joint  Benefits and risks of screening:  Talk with your healthcare provider about the risks and benefits of screening:  · Benefits  include finding osteoarthritis early, when you can manage the condition  You may be able to slow the progress of joint damage by losing weight or exercising more  You can also help make a treatment plan that you can change over time as needed  · Risks  include a false belief that you will not develop osteoarthritis  Your screening test may find that you do not have osteoarthritis  This can be because signs and symptoms are mild  You can still develop more severe signs and symptoms over time  It is important to talk with your healthcare provider about how often to have screening  Questions to ask your healthcare provider to help you make decisions about screening:   · How high is my risk for osteoarthritis? · How often do I need to have screening? · Where is the screening done? · Do I need to do anything to get ready to have screening? What happens after osteoarthritis screening: You will meet with your healthcare provider to go over the results of your screening  You, your family or caregiver, and your healthcare provider can talk about your treatment options  Together you can decide which treatment is right for you   You may need more tests to diagnose anything that showed up on the screening test  Common tests include an x-ray or CT scan to check the amount of tissue between bones or to find bone spurs  How osteoarthritis is treated, and the benefits of treatment:   · Lifestyle changes  include weight loss and exercise  Weight loss can help take pressure off the joint  Exercise can help build muscles around the joint  This helps build stability and strength  Your healthcare provider, a dietitian, or a physical therapist can help you make nutrition and exercise plans  · Devices  can help you stay active and relieve your symptoms  Shoe inserts support your joints and take pressure off the joints  This can help reduce pain that happens when you stand or walk  A knee brace can add stability to your knee when you walk  A cane can help take weight off your knee as you walk  · Medicines  include acetaminophen, NSAIDs, and certain antidepressants  NSAIDs, such as ibuprofen, and acetaminophen can be taken as a pill or rubbed into your skin in a cream  Acetaminophen and NSAIDs help relieve pain  NSAIDs also help reduce inflammation  Both medicines are available without a prescription  Do not take these medicines without talking to your healthcare provider first  Stronger forms of these medicines are available with a prescription  Antidepressants are only available with a prescription  · Therapy  includes physical and occupational therapy  A physical therapist can help you strengthen muscles around the joint and increase your range of motion  An occupational therapist can help you find easier ways to do your daily activities  For example, you may be shown how to climb stairs without putting stress on your knee  · Steroid medicine  may be injected into the knee area if your symptoms become worse  This can reduce inflammation and relieve pain  · Surgery  may be done if other treatments do not work and your symptoms become severe  Debridement is surgery to clean pieces of bone and cartilage out of the joint  This is done if you have knee buckling or locking   Osteotomy is surgery to move bones into a different position so they do not rub against other bones  Surgery called arthroplasty is used to remove the damaged joint and replace it with an artificial joint  Surgery can relieve pain by removing the problem that is causing your pain  Risks of osteoarthritis treatment:   · Lifestyle changes  will not reverse the damage to the tissue between your joints  It may prevent symptoms from getting worse  It may also be difficult to exercise if you have severe knee pain  · Medicines  must be taken in limited doses  Acetaminophen can cause liver damage, and NSAIDs can cause kidney damage  The pain may come back before you can take another dose  Antidepressants can cause certain side effects  · Steroid injections  are usually limited to about 4 each year  This is because the medicine can cause joint damage over time  Steroids can also increase your risk for infections, cause changes in your mood, and increase blood sugar levels  · Surgery  increases your risk for an infection or blood clot  Nerves, blood vessels, or tissues around the knee can be damaged during an osteotomy  An artificial joint may wear out over time  It may also become loose  It will need to be replaced if it wears out or comes loose  You will need to do knee exercises to prevent scar tissue from building up in your knee  This can be painful  Questions to ask your healthcare provider to help you make decisions about treatment:   · How will I know if signs and symptoms are becoming severe enough to need treatment? · How long will it take for each treatment option to help my knee pain get better? · Which pain medicines will work best for me? · Am I a good candidate for steroid injections? · Am I a good candidate for knee replacement surgery? · How long is recovery from knee replacement surgery? · Will I need to have more surgery over time?     · How often will I need to do knee exercises to prevent scar tissue from building up? © 2017 [facility] Information is for End User's use only and may not be sold, redistributed or otherwise used for commercial purposes  All illustrations and images included in CareNotes® are the copyrighted property of A D [employer] M [employer]  or [employer]  The above information is an  only  It is not intended as medical advice for individual conditions or treatments  Talk to your doctor, nurse or pharmacist before following any medical regimen to see if it is safe and effective for you

## 2018-02-08 ENCOUNTER — TRANSCRIBE ORDERS (OUTPATIENT)
Dept: ADMINISTRATIVE | Facility: HOSPITAL | Age: 78
End: 2018-02-08

## 2018-02-08 ENCOUNTER — ANTICOAG VISIT (OUTPATIENT)
Dept: CARDIOLOGY CLINIC | Facility: CLINIC | Age: 78
End: 2018-02-08

## 2018-02-08 ENCOUNTER — APPOINTMENT (OUTPATIENT)
Dept: LAB | Facility: HOSPITAL | Age: 78
End: 2018-02-08
Payer: MEDICARE

## 2018-02-08 DIAGNOSIS — I48.0 PAROXYSMAL ATRIAL FIBRILLATION (HCC): Primary | ICD-10-CM

## 2018-02-08 DIAGNOSIS — I48.91 ATRIAL FIBRILLATION, UNSPECIFIED TYPE (HCC): ICD-10-CM

## 2018-02-08 DIAGNOSIS — I48.0 PAROXYSMAL ATRIAL FIBRILLATION (HCC): ICD-10-CM

## 2018-02-08 LAB
INR PPP: 2.49 (ref 0.86–1.16)
PROTHROMBIN TIME: 26.4 SECONDS (ref 9.4–11.7)

## 2018-02-08 PROCEDURE — 85610 PROTHROMBIN TIME: CPT

## 2018-02-12 ENCOUNTER — OFFICE VISIT (OUTPATIENT)
Dept: OBGYN CLINIC | Facility: CLINIC | Age: 78
End: 2018-02-12
Payer: MEDICARE

## 2018-02-12 VITALS
HEART RATE: 89 BPM | DIASTOLIC BLOOD PRESSURE: 91 MMHG | WEIGHT: 238.6 LBS | HEIGHT: 64 IN | BODY MASS INDEX: 40.74 KG/M2 | SYSTOLIC BLOOD PRESSURE: 144 MMHG

## 2018-02-12 DIAGNOSIS — M17.0 PRIMARY OSTEOARTHRITIS OF BOTH KNEES: Primary | ICD-10-CM

## 2018-02-12 PROCEDURE — 20610 DRAIN/INJ JOINT/BURSA W/O US: CPT | Performed by: ORTHOPAEDIC SURGERY

## 2018-02-12 RX ORDER — HYALURONATE SODIUM 10 MG/ML
20 SYRINGE (ML) INTRAARTICULAR
Status: COMPLETED | OUTPATIENT
Start: 2018-02-12 | End: 2018-02-12

## 2018-02-12 RX ADMIN — Medication 20 MG: at 12:15

## 2018-02-12 NOTE — PROGRESS NOTES
Large joint arthrocentesis  Date/Time: 2/12/2018 12:15 PM  Consent given by: patient  Site marked: site marked  Timeout: Immediately prior to procedure a time out was called to verify the correct patient, procedure, equipment, support staff and site/side marked as required   Supporting Documentation  Indications: pain   Procedure Details  Location: knee - L knee  Preparation: Patient was prepped and draped in the usual sterile fashion  Needle size: 22 G  Ultrasound guidance: no  Approach: anterolateral  Medications administered: 20 mg Sodium Hyaluronate 20 MG/2ML    Patient tolerance: patient tolerated the procedure well with no immediate complications  Dressing:  Sterile dressing applied

## 2018-02-12 NOTE — PROGRESS NOTES
Large joint arthrocentesis  Date/Time: 2/12/2018 12:15 PM  Consent given by: patient  Site marked: site marked  Timeout: Immediately prior to procedure a time out was called to verify the correct patient, procedure, equipment, support staff and site/side marked as required   Supporting Documentation  Indications: pain   Procedure Details  Location: knee - R knee  Preparation: Patient was prepped and draped in the usual sterile fashion  Needle size: 22 G  Ultrasound guidance: no  Approach: anterolateral  Medications administered: 20 mg Sodium Hyaluronate 20 MG/2ML    Patient tolerance: patient tolerated the procedure well with no immediate complications  Dressing:  Sterile dressing applied

## 2018-02-13 ENCOUNTER — OFFICE VISIT (OUTPATIENT)
Dept: PODIATRY | Facility: CLINIC | Age: 78
End: 2018-02-13
Payer: MEDICARE

## 2018-02-13 VITALS
RESPIRATION RATE: 16 BRPM | BODY MASS INDEX: 40.74 KG/M2 | SYSTOLIC BLOOD PRESSURE: 144 MMHG | DIASTOLIC BLOOD PRESSURE: 91 MMHG | WEIGHT: 238.6 LBS | HEART RATE: 89 BPM | HEIGHT: 64 IN

## 2018-02-13 DIAGNOSIS — I70.209 PERIPHERAL ARTERIOSCLEROSIS (HCC): ICD-10-CM

## 2018-02-13 DIAGNOSIS — M79.672 PAIN IN BOTH FEET: ICD-10-CM

## 2018-02-13 DIAGNOSIS — M79.671 PAIN IN BOTH FEET: ICD-10-CM

## 2018-02-13 DIAGNOSIS — M21.969 ACQUIRED DEFORMITY OF FOOT, UNSPECIFIED LATERALITY: ICD-10-CM

## 2018-02-13 DIAGNOSIS — E11.42 DIABETIC POLYNEUROPATHY ASSOCIATED WITH TYPE 2 DIABETES MELLITUS (HCC): ICD-10-CM

## 2018-02-13 DIAGNOSIS — L84 CORNS: ICD-10-CM

## 2018-02-13 DIAGNOSIS — M54.16 RADICULOPATHY OF LUMBAR REGION: Primary | ICD-10-CM

## 2018-02-13 DIAGNOSIS — B35.1 ONYCHOMYCOSIS: ICD-10-CM

## 2018-02-13 PROCEDURE — 99211 OFF/OP EST MAY X REQ PHY/QHP: CPT | Performed by: PODIATRIST

## 2018-02-13 PROCEDURE — 11721 DEBRIDE NAIL 6 OR MORE: CPT | Performed by: PODIATRIST

## 2018-02-13 NOTE — PROGRESS NOTES
Assessment/Plan:    No problem-specific Assessment & Plan notes found for this encounter  Discussion/Summary  The patient was counseled regarding instructions for management,-- prognosis,-- patient and family education,-- risks and benefits of treatment options,-- importance of compliance with treatment  Patient is able to Self-Care  Possible side effects of new medications were reviewed with the patient/guardian today  The treatment plan was reviewed with the patient/guardian  The patient/guardian understands and agrees with the treatment plan      Chief Complaint  Routine nail care      History of Present Illness  HPI: Patient is doing well with Cymbalta however she began have facial tingling  She discontinued medicine  However the medication was relieving her neuropathic pain  Review of Systems     ROS reviewed  Active Problems     1  Achilles tendinitis of left lower extremity (726 71) (M76 62)   2  Acquired ankle/foot deformity (736 70) (M21 969)   3  AF (paroxysmal atrial fibrillation) (427 31) (I48 0)   4  Anticoagulant long-term use (V58 61) (Z79 01)   5  Arthritis (716 90) (M19 90)   6  At risk for bone density loss (V49 89) (Z91 89)   7  Atrial fibrillation (427 31) (I48 91)   8  History of Breast Cancer (V10 3)   9  Difficulty walking (719 7) (R26 2)   10  Encounter for screening mammogram for breast cancer (V76 12) (Z12 31)   11  Esophageal reflux (530 81) (K21 9)   12  Foot pain, bilateral (729 5) (M79 671,M79 672)   13  Hiatal hernia (553 3) (K44 9)   14  History of Carrero's esophagus (V12 79) (Z87 19)   15  History of fall (V15 88) (Z91 81)   16  Hypertension (401 9) (I10)   17  Leg pain, left (729 5) (M79 605)   18  Lichen sclerosus et atrophicus (701 0) (L90 0)   19  Lumbar radiculopathy (724 4) (M54 16)   20  Multiple lung nodules (793 19) (R91 8)   21  Obesity (278 00) (E66 9)   22  Onychomycosis (110 1) (B35 1)   23  Osteopenia (733 90) (M85 80)   24   Pacemaker (V45 01) (Z95 0) 25  Peripheral neuropathy (356 9) (G62 9)   26  Pes planus of both feet (734) (M21 41,M21 42)   27  Plantar fasciitis of left foot (728 71) (M72 2)   28  Restless legs syndrome (333 94) (G25 81)     Past Medical History   · History of Abnormal glucose (790 29) (R73 09)   · Atrial fibrillation (427 31) (I48 91)   · History of Breast Cancer (V10 3)   · 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)   · Esophageal reflux (530 81) (K21 9)   · Denied: History of Exposure To STD   · History of Gross hematuria (599 71) (R31 0)   · History of Hematoma (924 9) (T14 8XXA)   · History of Hematoma of lower extremity, right, initial encounter (924 5) (S80 11XA)   · History of backache (V13 59) (Z87 39)   · History of Carrero's esophagus (V12 79) (Z87 19)   · History of cataract (V12 49) (Z86 69)   · History of chest pain (V13 89) (P79 674)   · History of fall (V15 88) (Z91 81)   · History of hematuria (V13 09) (Z87 448)   · History of postmenopausal hormone replacement therapy (V87 49) (Z92 29)   · History of shortness of breath (V13 89) (E25 864)   · History of urinary incontinence (V13 09) (Z87 898)   · History of Neck pain (723 1) (M54 2)   · Normal delivery (650) (O80,Z37  9)   · History of Right knee pain (719 46) (M25 561)   · History of Ringing In The Ears (Tinnitus) (388 30)   · History of Skin rash (782 1) (R21)   · History of Traumatic blister of right lower extremity, initial encounter (916 2) (Z30 728N)   · History of UTI (lower urinary tract infection) (599 0) (N39 0)     The active problems and past medical history were reviewed and updated today        Surgical History   · Denied: History of Abnormal Pap Smear Of Cervix   · History of Breast Surgery Lumpectomy   · History of Cataract Surgery   · History of Diagnostic Cystoscopy   · History of Excision Lesion Of Meniscus Or Capsule Knee   · History of Pacemaker - Pulse Generator Replacement   · History of Pacemaker Placement   · History of Pacemaker Placement   · History of Radiation Therapy   · History of Total Abdominal Hysterectomy With Removal Of Both Ovaries     The surgical history was reviewed and updated today  Family History  Mother    · Denied: Family history of Alcoholism and drug addiction in family   · Denied: Family history of Anxiety and depression  Father    · Denied: Family history of Alcoholism and drug addiction in family   · Denied: Family history of Anxiety and depression   · Family history of Coronary Artery Disease (V17 49)   · Family history of abdominal aortic aneurysm (V17 49) (Z82 49)  Child    · Denied: Family history of Alcoholism and drug addiction in family   · Denied: Family history of Anxiety and depression  Sibling    · Denied: Family history of Alcoholism and drug addiction in family   · Denied: Family history of Anxiety and depression  Sister    · Family history of Breast Cancer (V16 3)  Maternal Aunt    · Family history of Colon Cancer (V16 0)   · Family history of Colon Cancer (V16 0)  Family History    · Denied: Family history of Diabetes Mellitus   · Denied: Family history of Stroke Syndrome     The family history was reviewed and updated today  Social History      · Being A Social Drinker   · Denied: History of Drug Use   · Former smoker (V15 82) (Q34 823)   · 25 pack years, quit age 41   · Marital History - Currently    · Retired From Work   · 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  The social history was reviewed and updated today  Current Meds   1  Clobetasol Propionate 0 05 % External Ointment; Apply to affected area twice weekly; Therapy: 50HXG2363 to (Last SI:32AMD9950)  Requested for: 25Oct2017 Ordered   2  Gabapentin 800 MG Oral Tablet; TAKE 1 TABLET 4 TIMES DAILY; Therapy: 06MBR6892 to Recorded   3  Lisinopril 40 MG Oral Tablet; TAKE ONE TABLET BY MOUTH EVERY DAY AS DIRECTED; Therapy: 65ZPG6378 to (Evaluate:22Mar2018)  Requested for: 27Mar2017; Last Rx:27Mar2017 Ordered   4   Metoprolol Tartrate 50 MG Oral Tablet; TAKE 1 TABLET TWICE DAILY  Requested for: 01PKL3213; Last Rx:27Mar2017 Ordered   5  Multivitamins CAPS; TAKE 1 CAPSULE DAILY; Therapy: (Recorded:11Mar2014) to Recorded   6  Pramipexole Dihydrochloride 0 5 MG Oral Tablet; TAKE 1 5 TABLETS Bedtime; Therapy: 61VTW0713 to (Last Rx:41Srl7136) Ordered   7  Probiotic Oral Packet; Therapy: 48NVL7637 to Recorded   8  RaNITidine HCl - 150 MG Oral Tablet; TAKE 1 TABLET AT BEDTIME; Therapy: 55OPK5347 to [id number])  Requested for: 81OEC2879; Last Rx:30Oct2017 Ordered   9  Savella 50 MG Oral Tablet; TAKE ONE TAB TWICE DAILY; Therapy: 82FUS1481 to (Last Rx:20Nov2017)  Requested for: 20Nov2017 Ordered   10  Savella 50 MG Oral Tablet; Therapy: 40MSI1630 to Recorded   11  Savella Titration Pack 12 5 & 25 & 50 MG Oral Miscellaneous; as directed; Therapy: 89WXX6996 to (Evaluate:10Oct2017)  Requested for: 57OPI0112; Last  Rx:87Zzg9518 Ordered   12  Warfarin Sodium 5 MG Oral Tablet; take 1 to 1 1/2 tabs by mouth daily or sa directed; Therapy: 34CQX9130 to 786 335 913)  Requested for: 90PXN8570; Last  Rx:28Mar2017 Ordered     The medication list was reviewed and updated today  Allergies  1  duloxetine   2  LevoFLOXacin TABS   3  Cephalexin CAPS   4  Erythromycin Base TABS   5  Keflex TABS   6  Penicillins   7  Sulfa Drugs     Vitals    Recorded: 80YKQ4025 01:46PM   Heart Rate 78   Respiration 16   Systolic 432   Diastolic 81   Height 5 ft 4 in   Weight 233 lb    BMI Calculated 39 99   BSA Calculated 2 09      Physical Exam  Left Foot: Appearance: Normal except as noted: excessive pronation-- and-- pes planus  Tenderness: None except the lisfranc joint-- and-- medial longitudinal arch  ROM: subtalar motion was restricted  Right Foot: Appearance: Normal except as noted: excessive pronation-- and-- pes planus  Tenderness: None except the lisfranc joint-- and-- medial longitudinal arch  ROM: subtalar motion was restricted      Left Ankle: ROM: limited ROM in all planes   Right Ankle: ROM: limited ROM in all planes   Neurological Exam: performed  Light touch was decreased bilaterally  Vibratory sensation was decreased in both first metatarsophalangeal joints  Deep tendon reflexes: achilles reflex absent bilateraly-- and-- 4/5 L5 testing bilateral    Vascular Exam: performed Dorsalis pedis pulses were diminished bilaterally  Posterior tibial pulses were diminished bilaterally  Dependence rubor was present bilaterally  Capillary refill time was Negative digital hair noted, but-- between 1-3 seconds bilaterally  Toenails: All of the toenails were elongated,-- hypertrophied,-- discolored-- and--   Hyperkeratosis: present on both first toes  Shoe Gear Evaluation: performed ()  Recommendation(s): SAS style  Procedure  Nails debrided  Bilateral preulcerative lesions debrided as well  Procedure without pain or complication  Refill of Savella ordered  There are no diagnoses linked to this encounter  Subjective:      Patient ID: Ankit Resendez is a 78 y o  female  HPI    The following portions of the patient's history were reviewed and updated as appropriate: allergies, current medications, past family history, past medical history, past social history, past surgical history and problem list     Review of Systems      Objective: Foot Exam    Right Foot/Ankle     Inspection and Palpation  Skin Exam: dry skin and skin intact; Neurovascular  Dorsalis pedis: 1+  Posterior tibial: 1+      Left Foot/Ankle      Inspection and Palpation  Skin Exam: dry skin and skin intact; Neurovascular  Dorsalis pedis: 1+  Posterior tibial: 1+        Physical Exam   Cardiovascular: Pulses are weak pulses  Pulses:       Dorsalis pedis pulses are 1+ on the right side, and 1+ on the left side  Posterior tibial pulses are 1+ on the right side, and 1+ on the left side  Feet:   Right Foot:   Skin Integrity: Positive for dry skin     Left Foot: Skin Integrity: Positive for dry skin  Patient's shoes and socks removed  Right Foot/Ankle   Right Foot Inspection  Skin Exam: skin intact and dry skin                          Toe Exam: swelling  Sensory   Vibration: absent  Proprioception: absent   Monofilament testing: absent  Vascular    The right DP pulse is 1+  The right PT pulse is 1+  Left Foot/Ankle  Left Foot Inspection  Skin Exam: skin intact and dry skin                         Toe Exam: swelling                   Sensory   Vibration: absent  Proprioception: absent  Monofilament: absent  Vascular    The left DP pulse is 1+  The left PT pulse is 1+  Assign Risk Category:  Deformity present;  Loss of protective sensation; Weak pulses       Risk: 2

## 2018-02-21 ENCOUNTER — OFFICE VISIT (OUTPATIENT)
Dept: OBGYN CLINIC | Facility: CLINIC | Age: 78
End: 2018-02-21
Payer: MEDICARE

## 2018-02-21 VITALS
WEIGHT: 235.4 LBS | HEIGHT: 64 IN | BODY MASS INDEX: 40.19 KG/M2 | SYSTOLIC BLOOD PRESSURE: 115 MMHG | HEART RATE: 90 BPM | DIASTOLIC BLOOD PRESSURE: 78 MMHG

## 2018-02-21 DIAGNOSIS — M17.0 PRIMARY OSTEOARTHRITIS OF BOTH KNEES: Primary | ICD-10-CM

## 2018-02-21 DIAGNOSIS — M25.551 RIGHT HIP PAIN: ICD-10-CM

## 2018-02-21 PROCEDURE — 20610 DRAIN/INJ JOINT/BURSA W/O US: CPT | Performed by: ORTHOPAEDIC SURGERY

## 2018-02-21 RX ORDER — HYALURONATE SODIUM 10 MG/ML
20 SYRINGE (ML) INTRAARTICULAR
Status: COMPLETED | OUTPATIENT
Start: 2018-02-21 | End: 2018-02-21

## 2018-02-21 RX ADMIN — Medication 20 MG: at 11:50

## 2018-02-21 NOTE — PROGRESS NOTES
Large joint arthrocentesis  Date/Time: 2/21/2018 11:50 AM  Consent given by: patient  Site marked: site marked  Timeout: Immediately prior to procedure a time out was called to verify the correct patient, procedure, equipment, support staff and site/side marked as required   Supporting Documentation  Indications: pain   Procedure Details  Location: knee - R knee  Preparation: Patient was prepped and draped in the usual sterile fashion  Needle size: 22 G  Ultrasound guidance: no  Approach: anterolateral  Medications administered: 20 mg Sodium Hyaluronate 20 MG/2ML    Patient tolerance: patient tolerated the procedure well with no immediate complications  Dressing:  Sterile dressing applied

## 2018-02-21 NOTE — PROGRESS NOTES
Large joint arthrocentesis  Date/Time: 2/21/2018 11:50 AM  Consent given by: patient  Site marked: site marked  Timeout: Immediately prior to procedure a time out was called to verify the correct patient, procedure, equipment, support staff and site/side marked as required   Supporting Documentation  Indications: pain   Procedure Details  Location: knee - L knee  Preparation: Patient was prepped and draped in the usual sterile fashion  Needle size: 22 G  Ultrasound guidance: no  Approach: anterolateral  Medications administered: 20 mg Sodium Hyaluronate 20 MG/2ML    Patient tolerance: patient tolerated the procedure well with no immediate complications  Dressing:  Sterile dressing applied

## 2018-02-28 ENCOUNTER — OFFICE VISIT (OUTPATIENT)
Dept: NEUROLOGY | Facility: CLINIC | Age: 78
End: 2018-02-28
Payer: MEDICARE

## 2018-02-28 VITALS
WEIGHT: 235 LBS | DIASTOLIC BLOOD PRESSURE: 92 MMHG | HEIGHT: 64 IN | HEART RATE: 91 BPM | SYSTOLIC BLOOD PRESSURE: 130 MMHG | BODY MASS INDEX: 40.12 KG/M2

## 2018-02-28 DIAGNOSIS — G25.81 RLS (RESTLESS LEGS SYNDROME): ICD-10-CM

## 2018-02-28 DIAGNOSIS — G60.8 SENSORY POLYNEUROPATHY: Primary | ICD-10-CM

## 2018-02-28 PROCEDURE — 99214 OFFICE O/P EST MOD 30 MIN: CPT | Performed by: PSYCHIATRY & NEUROLOGY

## 2018-02-28 RX ORDER — GABAPENTIN 800 MG/1
800 TABLET ORAL 4 TIMES DAILY
Qty: 360 TABLET | Refills: 1 | Status: SHIPPED | OUTPATIENT
Start: 2018-02-28 | End: 2018-04-05 | Stop reason: SDUPTHER

## 2018-02-28 RX ORDER — PRAMIPEXOLE DIHYDROCHLORIDE 0.5 MG/1
TABLET ORAL
Qty: 135 TABLET | Refills: 1 | Status: SHIPPED | OUTPATIENT
Start: 2018-02-28 | End: 2018-04-05 | Stop reason: SDUPTHER

## 2018-02-28 RX ORDER — PRAMIPEXOLE DIHYDROCHLORIDE 0.5 MG/1
0.75 TABLET ORAL 2 TIMES DAILY
COMMUNITY
End: 2018-02-28 | Stop reason: SDUPTHER

## 2018-02-28 NOTE — PROGRESS NOTES
Patient ID: Jessica Zepeda is a 78 y o  female  Assessment/Plan:    Restless leg syndrome     painful sensory neuropathy            Subjective: This is a 22-year-old female with restless leg syndrome  Patient denies any creepy crawling sensation, and the urge to move the legs  Patient also carries diagnosis of neuropathy  Patient does have a numbness tingling and pins and needles sensation on both feet intermittently  Patient denies started shooting pain but does have burning pain at severity scale 6  She feels her liliana   es are too tight but pain remains even when she takes of the shoes  Patient denies feeling of walking on emmanuel or with bunched up socks  Patient does feel her feet on the floor  Pt denies double vision, blurred vision, transient monocular blindness, vertigo, tinnitus, hearing loss, slurred speech, difficulty expressing and understanding, dysphasia, and focal weakness, numbness, imbalance and incoordination  Pt denies bladder and bowel urgency, frequency and incontinence  Pt denies double vision, blurred vision, transient monocular blindness, vertigo, tinnitus, hearing loss, slurred speech, difficulty expressing and understanding, dysphasia, and focal weakness, numbness, imbalance and incoordination  Pt denies bladder and bowel urgency, frequency and incontinence         Past Medical History:   Diagnosis Date    Atrial fibrillation (HCC)     Cancer (HCC)     Left Breast, Lumpectomy    Esophageal reflux     GERD (gastroesophageal reflux disease)     Hiatal hernia     Hypertension     Irregular heart beat     AFIB    Mixed sensory-motor polyneuropathy     Neuropathy     Pacemaker     Paroxysmal atrial fibrillation (HCC)     Peripheral neuropathy     Rectal bleeding     Restless leg syndrome        Family History   Problem Relation Age of Onset    Hypertension Mother     Heart disease Father     Aneurysm Father     Scleroderma Sister     Breast cancer Sister  Hypertension Sister     No Known Problems Son     Testicular cancer Son     Thyroid cancer Son    ,    Social History     Social History    Marital status: /Civil Union     Spouse name: N/A    Number of children: N/A    Years of education: N/A     Occupational History    Not on file  Social History Main Topics    Smoking status: Former Smoker     Quit date: 9/20/1980    Smokeless tobacco: Never Used      Comment: Quit over 30 years ago    Alcohol use Yes      Comment: rarely    Drug use: No    Sexual activity: Not on file     Other Topics Concern    Not on file     Social History Narrative    No narrative on file       Current Outpatient Prescriptions on File Prior to Visit   Medication Sig Dispense Refill    Calcium Acetate, Phos Binder, (CALCIUM ACETATE PO) Take by mouth daily        clobetasol (TEMOVATE) 0 05 % cream Apply topically 2 (two) times a week      gabapentin (NEURONTIN) 800 mg tablet Take 800 mg by mouth 4 (four) times a day      lisinopril (ZESTRIL) 40 mg tablet TAKE 1 TABLET BY MOUTH  EVERY DAY AS DIRECTED 90 tablet 3    metoprolol tartrate (LOPRESSOR) 50 mg tablet Take 50 mg by mouth 2 (two) times a day      multivitamin (THERAGRAN) TABS Take 1 tablet by mouth daily      ranitidine (ZANTAC) 150 mg tablet Take 1 tablet by mouth as needed        warfarin (COUMADIN) 5 mg tablet Take 7 5 mg by mouth daily        Milnacipran HCl (SAVELLA) 50 MG TABS Take 1 tablet by mouth 2 (two) times a day for 30 days 60 tablet 1    pantoprazole (PROTONIX) 40 mg tablet Take 40 mg by mouth daily      PRAMIPEXOLE DIHYDROCHLORIDE PO Take 5 mg by mouth 2 (two) times a day 1 1/2 tabs x 2 daily      traMADol (ULTRAM) 50 mg tablet Take 1 tablet by mouth every 6 (six) hours as needed for moderate pain for up to 15 doses 15 tablet 0     No current facility-administered medications on file prior to visit                   Objective:    Blood pressure 130/92, pulse 91, height 5' 4" (1 626 m), weight 107 kg (235 lb)  Physical Exam   Eyes: EOM are normal  Pupils are equal, round, and reactive to light  Neck: Normal carotid pulses present  Carotid bruit is not present  Neurological: She has normal reflexes  Gait normal    Psychiatric: Her speech is normal        Neurological Exam    Mental Status  The patient is and oriented to person, place, time, and situation  Her recent and remote memory are normal  She has no visuospatial neglect  Her speech is normal  Her language is fluent with no aphasia  She follows multi-step commands  She has a normal fund of knowledge  Cranial Nerves    CN II: The patient's visual acuity and visual fields are normal   CN III, IV, VI: The patient's pupils are equally round and reactive to light and ocular movements are normal   CN V: The patient has normal facial sensation  CN VII:  The patient has symmetric facial movement  CN VIII:  The patient's hearing is normal   CN IX, X: The patient has symmetric palate movement and normal gag reflex  CN XI: The patient's shoulder shrug strength is normal   CN XII: The patient's tongue is midline without atrophy or fasciculations  Motor  The patient has normal muscle bulk throughout  Her overall muscle tone is normal throughout  Her strength is 5/5 in all four extremities except as noted  Sensory  The patient's sensation is normal in all four extremities to light touch, pinprick and proprioception  She has normal cortical sensation    Reflexes  Deep tendon reflexes are 2+ and symmetric in all four extremities with downgoing toes bilaterally  Gait and Coordination  The patient has normal gait and station and normal casual, toe, heel, and tandem gait  She has normal right heel to shin and normal left heel to shin coordination  She has normal right finger to nose and normal left finger to nose coordination  ROS:    Review of Systems   Constitutional: Negative  HENT: Negative  Eyes: Negative  Respiratory: Negative  Cardiovascular: Negative  Gastrointestinal: Negative  Endocrine: Negative  Genitourinary: Negative  Musculoskeletal: Negative  Skin: Negative  Allergic/Immunologic: Negative  Neurological: Negative  Hematological: Negative  Psychiatric/Behavioral: Negative

## 2018-03-01 DIAGNOSIS — I48.91 ATRIAL FIBRILLATION (HCC): ICD-10-CM

## 2018-03-01 DIAGNOSIS — M85.80 OTHER SPECIFIED DISORDERS OF BONE DENSITY AND STRUCTURE, UNSPECIFIED SITE: ICD-10-CM

## 2018-03-01 DIAGNOSIS — I48.0 PAROXYSMAL ATRIAL FIBRILLATION (HCC): ICD-10-CM

## 2018-03-01 DIAGNOSIS — I10 ESSENTIAL (PRIMARY) HYPERTENSION: ICD-10-CM

## 2018-03-01 DIAGNOSIS — G25.81 RESTLESS LEGS SYNDROME: ICD-10-CM

## 2018-03-06 ENCOUNTER — ANTICOAG VISIT (OUTPATIENT)
Dept: CARDIOLOGY CLINIC | Facility: CLINIC | Age: 78
End: 2018-03-06

## 2018-03-06 ENCOUNTER — TRANSCRIBE ORDERS (OUTPATIENT)
Dept: ADMINISTRATIVE | Facility: HOSPITAL | Age: 78
End: 2018-03-06

## 2018-03-06 ENCOUNTER — APPOINTMENT (OUTPATIENT)
Dept: LAB | Facility: HOSPITAL | Age: 78
End: 2018-03-06
Payer: MEDICARE

## 2018-03-06 DIAGNOSIS — I48.0 PAROXYSMAL ATRIAL FIBRILLATION (HCC): Primary | ICD-10-CM

## 2018-03-06 DIAGNOSIS — G25.81 RESTLESS LEGS: ICD-10-CM

## 2018-03-06 DIAGNOSIS — I10 ESSENTIAL HYPERTENSION, MALIGNANT: ICD-10-CM

## 2018-03-06 DIAGNOSIS — I48.91 ATRIAL FIBRILLATION, UNSPECIFIED TYPE (HCC): ICD-10-CM

## 2018-03-06 DIAGNOSIS — I48.0 PAROXYSMAL ATRIAL FIBRILLATION (HCC): ICD-10-CM

## 2018-03-06 LAB
25(OH)D3 SERPL-MCNC: 24.2 NG/ML (ref 30–100)
ALBUMIN SERPL BCP-MCNC: 3.8 G/DL (ref 3.5–5)
ALP SERPL-CCNC: 69 U/L (ref 46–116)
ALT SERPL W P-5'-P-CCNC: 39 U/L (ref 12–78)
ANION GAP SERPL CALCULATED.3IONS-SCNC: 9 MMOL/L (ref 4–13)
AST SERPL W P-5'-P-CCNC: 29 U/L (ref 5–45)
BASOPHILS # BLD AUTO: 0 THOUSANDS/ΜL (ref 0–0.1)
BASOPHILS NFR BLD AUTO: 0 % (ref 0–1)
BILIRUB SERPL-MCNC: 0.7 MG/DL (ref 0.2–1)
BUN SERPL-MCNC: 25 MG/DL (ref 5–25)
CALCIUM SERPL-MCNC: 9.2 MG/DL (ref 8.3–10.1)
CHLORIDE SERPL-SCNC: 103 MMOL/L (ref 100–108)
CHOLEST SERPL-MCNC: 150 MG/DL (ref 50–200)
CO2 SERPL-SCNC: 28 MMOL/L (ref 21–32)
CREAT SERPL-MCNC: 0.75 MG/DL (ref 0.6–1.3)
EOSINOPHIL # BLD AUTO: 0.1 THOUSAND/ΜL (ref 0–0.61)
EOSINOPHIL NFR BLD AUTO: 1 % (ref 0–6)
ERYTHROCYTE [DISTWIDTH] IN BLOOD BY AUTOMATED COUNT: 14.3 % (ref 11.6–15.1)
FERRITIN SERPL-MCNC: 71 NG/ML (ref 8–388)
GFR SERPL CREATININE-BSD FRML MDRD: 78 ML/MIN/1.73SQ M
GLUCOSE P FAST SERPL-MCNC: 92 MG/DL (ref 65–99)
HCT VFR BLD AUTO: 42.2 % (ref 37–47)
HDLC SERPL-MCNC: 40 MG/DL (ref 40–60)
HGB BLD-MCNC: 13.9 G/DL (ref 12–16)
INR PPP: 2.34 (ref 0.86–1.16)
LDLC SERPL CALC-MCNC: 88 MG/DL (ref 0–100)
LYMPHOCYTES # BLD AUTO: 2.1 THOUSANDS/ΜL (ref 0.6–4.47)
LYMPHOCYTES NFR BLD AUTO: 25 % (ref 14–44)
MCH RBC QN AUTO: 30.5 PG (ref 27–31)
MCHC RBC AUTO-ENTMCNC: 33 G/DL (ref 31.4–37.4)
MCV RBC AUTO: 92 FL (ref 82–98)
MONOCYTES # BLD AUTO: 0.5 THOUSAND/ΜL (ref 0.17–1.22)
MONOCYTES NFR BLD AUTO: 6 % (ref 4–12)
NEUTROPHILS # BLD AUTO: 5.6 THOUSANDS/ΜL (ref 1.85–7.62)
NEUTS SEG NFR BLD AUTO: 67 % (ref 43–75)
NRBC BLD AUTO-RTO: 0 /100 WBCS
PLATELET # BLD AUTO: 212 THOUSANDS/UL (ref 130–400)
PMV BLD AUTO: 9 FL (ref 8.9–12.7)
POTASSIUM SERPL-SCNC: 4.2 MMOL/L (ref 3.5–5.3)
PROT SERPL-MCNC: 7.6 G/DL (ref 6.4–8.2)
PROTHROMBIN TIME: 24.8 SECONDS (ref 9.4–11.7)
RBC # BLD AUTO: 4.57 MILLION/UL (ref 4.2–5.4)
SODIUM SERPL-SCNC: 140 MMOL/L (ref 136–145)
TRIGL SERPL-MCNC: 110 MG/DL
TSH SERPL DL<=0.05 MIU/L-ACNC: 1.35 UIU/ML (ref 0.36–3.74)
WBC # BLD AUTO: 8.4 THOUSAND/UL (ref 4.8–10.8)

## 2018-03-06 PROCEDURE — 85025 COMPLETE CBC W/AUTO DIFF WBC: CPT | Performed by: INTERNAL MEDICINE

## 2018-03-06 PROCEDURE — 82306 VITAMIN D 25 HYDROXY: CPT

## 2018-03-06 PROCEDURE — 85610 PROTHROMBIN TIME: CPT

## 2018-03-06 PROCEDURE — 82728 ASSAY OF FERRITIN: CPT

## 2018-03-06 PROCEDURE — 80053 COMPREHEN METABOLIC PANEL: CPT | Performed by: INTERNAL MEDICINE

## 2018-03-06 PROCEDURE — 84443 ASSAY THYROID STIM HORMONE: CPT

## 2018-03-06 PROCEDURE — 36415 COLL VENOUS BLD VENIPUNCTURE: CPT | Performed by: INTERNAL MEDICINE

## 2018-03-06 PROCEDURE — 80061 LIPID PANEL: CPT | Performed by: INTERNAL MEDICINE

## 2018-03-07 NOTE — PROGRESS NOTES
"  Discussion/Summary  Normal device function      Results/Data  Cardiac Device In Clinic [id number] 02:41PM Heber Kiddiss     Test Name Result Flag Reference   MISCELLANEOUS COMMENT (Report)     DEVICE INTERROGATED IN THE [facility] OFFICE: BATTERY VOLTAGE ADEQUATE (7 5 YRS)   19 1% ALL LEAD PARAMETERS WITHIN NORMAL LIMITS  65 VHR EPISODES UP TO 11 5 MIN WITH AVAILABLE EGRAMS SHOWING UNCONTROLLED AFIB  HX OF CHRONIC AFIB  CANNOT EXCLUDE ANY NSVT  PT TAKES WARFARIN/METOPROLOL TART  EF 60% (ECHO DEC 2014)  NO OTHER SIGNIFICANT HIGH RATE EPISODES  NO PROGRAMMING CHANGES MADE TO DEVICE PARAMETERS  PACEMAKER FUNCTIONING APPROPRIATELY  CP   Cardiac Electrophysiology Report      [id number] [id number] [id number]_1  pdf   DEVICE TYPE Pacemaker       Cardiac Electrophysiology Report [id number] 02:41PM Heber Prado     Test Name Result Flag Reference   Cardiac Electrophysiology Report      [id number]  pdf     Signatures   Electronically signed by : Reese Monroe, ; Jul 19 2016 11:16AM EST                       (Author)    Electronically signed by : LAUREL Marcos ; Jul 19 2016 11:27AM EST                       (Author)    "

## 2018-03-07 NOTE — PROGRESS NOTES
"  Discussion/Summary  Normal device function      Results/Data  Cardiac Device Remote 19Oct2016 05:40PM Marsha Pro     Test Name Result Flag Reference   MISCELLANEOUS COMMENT (Report)     CARELINK TRANSMISSION: BATTERY VOLTAGE ADEQUATE (7 5 YRS)  -18%  876 VHR EPISODES LONGEST 9 5 MINS (NO EGM FOR THIS EPISODE) W/ HX OF SAME- PROB RVR BUT CANNOT EXCLUDE ANY NSVT  EF-60% (ECHO 12/1/14)  HX: CHRONIC AF & ON WARFARIN & METOPROLOL  ALL AVAILABLE LEAD PARAMETERS WITHIN NORMAL LIMITS  NORMAL DEVICE FUNCTION  [facility]   Cardiac Electrophysiology Report      sl[id number] Hospit[facility]_Natividad_[id number]  pdf   DEVICE TYPE Pacemaker       Cardiac Electrophysiology Report [id number] 05:40PM Marsha Pro     Test Name Result Flag Reference   Cardiac Electrophysiology Report      [id number]  pdf     Signatures   Electronically signed by : Cecil Marquez RN; Oct 19 2016 12:04PM EST                       (Author)    Electronically signed by : LAUREL Warren ; Oct 19 2016  1:25PM EST                       (Author)    "

## 2018-03-07 NOTE — PROGRESS NOTES
"  Discussion/Summary  Normal device function     A fib  on warfarin     Results/Data  Results   Cardiac Device Remote [id number] 03:24PM Nadeem Ramirez     Test Name Result Flag Reference   MISCELLANEOUS COMMENT (Report)     CARELINK TRANSMISSION: BATTERY VOLTAGE ADEQUATE (8 5 YRS);  = 17%; CHRONIC AF - PT TAKES WARFARIN; (3329) DEVICE CLASSIFIED VHR EPISODES - EPISODES STORED APPEAR TO BE AF/RVR WITH LONGEST DURATION @ >=7 MINS; PT ALSO TAKES METOPROLOL TARTATE; TASK TO DR HERNANDEZ FOR REVIEW; ALL LEAD PARAMETERS APPEAR WITHIN NORMAL LIMITS/STABLE; NO OTHER TESTING AVAILABLE DUE TO REMOTE TRANSMISSION; NORMAL DEVICE FUNCTION  eb   Cardiac Electrophysiology Report      [id number]}  pdf   DEVICE TYPE Pacemaker       Cardiac Electrophysiology Report [id number] 03:24PM Nadeem [facility]     Test Name Result Flag Reference   Cardiac Electrophysiology Report      [id number]  pdf     Signatures   Electronically signed by : Bela Cortez, ; Jan 18 2016 12:19PM EST                       (Author)    Electronically signed by : LAUREL Nicole ; Jan 18 2016  7:32PM EST                       (Author)    "

## 2018-03-07 NOTE — PROGRESS NOTES
"  Discussion/Summary  Normal device function      Results/Data  Cardiac Device Remote [id number] 01:58AM Aminata Busing     Test Name Result Flag Reference   MISCELLANEOUS COMMENT      CARELINK TRANSMISSION: BATTERY STATUS "OK"   21%  MULTIPLE VHRS NOTED  AVAIL MARKER/EGRAMS PRESENT AS RVR  PT ON METO TART & EF 60% (2016)  ALL AVAILABLE LEAD PARAMETERS WITHIN NORMAL LIMITS  NORMAL DEVICE FUNCTION  NC   Cardiac Electrophysiology Report      sl[id number]_[place]_[id number]  pdf   DEVICE TYPE Pacemaker       Cardiac Electrophysiology Report [id number] 01:58AM Aminata Busing     Test Name Result Flag Reference   Cardiac Electrophysiology Report      [id number]  pdf     Signatures   Electronically signed by : Colton Perales, ; Apr 25 2017 12:53PM EST                       (Author)    Electronically signed by : Stephanie Monteiro DO; May  6 2017  9:35AM EST                       (Author)    "

## 2018-03-07 NOTE — PROGRESS NOTES
"  Discussion/Summary  Normal device function      Results/Data  Cardiac Device Remote [id number] 01:39PM Conchita Salt     Test Name Result Flag Reference   MISCELLANEOUS COMMENT      CARELINK TRANSMISSION: [id number] VOLTAGE ADEQUATE  (7YRS)   20%  ALL AVAILABLE LEAD PARAMETERS WITHIN NORMAL LIMITS  [address] ALL APPEAR TO BE RVR  PATIENT CURRENTLY IN AFIB AND ON WARFARIN  NORMAL DEVICE FUNCTION  ----OVERTON   Cardiac Electrophysiology Report      sl[id number]_Natividad_[id number]  pdf   DEVICE TYPE Pacemaker       Cardiac Electrophysiology Report [id number] 01:39PM Conchita Salt     Test Name Result Flag Reference   Cardiac Electrophysiology Report      [id number] pdf     Signatures   Electronically signed by : Jesenia Peguero, ; Jan 19 2017 11:46AM EST                       (Author)    Electronically signed by : LAUREL Ly ; Jan 19 2017  6:32PM EST                       (Author)    "

## 2018-03-07 NOTE — PROGRESS NOTES
"  Discussion/Summary  Normal device function      Results/Data  Cardiac Device In Clinic 10Aug2017 02:31PM Carlota Grider     Test Name Result Flag Reference   MISCELLANEOUS COMMENT (Report)     DEVICE INTERROGATED IN THE [facility] OFFICE: BATTERY VOLTAGE ADEQUATE (6 YRS)   19 7%  ALL LEAD PARAMETERS WITHIN NORMAL LIMITS  MULTIPLE VHR EPISODES WITH EGRAMS SHOWING AF/RVR  NSVT CAN NOT BE RULED OUT ON MARKER ONLY EPISODES  HX OF SAME  PT TAKES METOPROLOL TART AND WARFARIN  EF 65% (ECHO JULY 2017)  NO OTHER SIGNIFICANT HIGH RATE EPISODES  PT C/O SOB/FEELING TIRED DURING ACTIVITY (ESPECIALLY CLIMBING STAIRS)  PROG ADL RESPONSE FROM 3-4, ACTIVITY THRESHOLD FROM MED/LOW TO LOW, AND ACTIVITY ACCELERATION FROM 30-15 SEC  PACEMAKER FUNCTIONING APPROPRIATELY  CP   Cardiac Electrophysiology Report      [id number] [id number]_Session [id number]  pdf   DEVICE TYPE Pacemaker       Cardiac Electrophysiology Report 10Aug2017 02:31PM Carlota Grider     Test Name Result Flag Reference   Cardiac Electrophysiology Report      [id number]  pdf     Signatures   Electronically signed by : Reinhold Hatchet, ; Aug 10 2017 10:55AM EST                       (Author)    Electronically signed by : LAUREL Elliott ; Aug 10 2017 11:30AM EST                       (Author)    "

## 2018-03-07 NOTE — PROGRESS NOTES
"  Discussion/Summary  Normal device function     Only markers present  history of chronic A fib  on coumadin  prefer to change metoprolol tartarate to succinate  Results/Data  Results   Cardiac Device Remote [id number] 01:17PM [facility]     Test Name Result Flag Reference   MISCELLANEOUS COMMENT (Report)     CARELINK TRANSMISSION: BATTERY VOLTAGE ADEQUATE (8 YRS)  -18%  [address] [facility] 7/14/15 LONGEST 11 5 MINS  AVAILABLE EGM'S SHOW RVR BUT CANNOT EXCLUDE ANY NSVT  EF-60% (ECHO 12/1/14)  HX: CHRONIC AF  PT ON WARFARIN & METOPROLOL  ALL AVAILABLE LEAD PARAMETERS WITHIN NORMAL LIMITS  NORMAL DEVICE FUNCTION  GV   Cardiac Electrophysiology Report      [id number]}  pdf   DEVICE TYPE Pacemaker       Cardiac Electrophysiology Report [id number] 01:17PM [employer]     Test Name Result Flag Reference   Cardiac Electrophysiology Report      [id number]  pdf     Signatures   Electronically signed by : Terrance Charlton RN; May  9 2016  3:15PM EST                       (Author)    Electronically signed by : LAUREL Morrison ; May  9 2016  9:39PM EST                       (Author)    "

## 2018-03-07 NOTE — PROGRESS NOTES
"  Discussion/Summary  Normal device function      Results/Data  Cardiac Device Remote 27Oct2017 10:36PM Marsha Pro     Test Name Result Flag Reference   MISCELLANEOUS COMMENT      CARELINK TRANSMISSION: [id number] VOLTAGE ADEQUATE  (5 5 YRS)   27%  ALL AVAILABLE LEAD PARAMETERS WITHIN NORMAL LIMITS  [address] ALL APPEAR TO BE RVR  PATIENT IS ON WARFARIN  NORMAL DEVICE FUNCTION  ----OVERTON   Cardiac Electrophysiology Report      [id number] Rhode IslandRuth_[id number]_[id number]  pdf   DEVICE TYPE Pacemaker       Cardiac Electrophysiology Report [id number] 10:36PM Marsha Pro     Test Name Result Flag Reference   Cardiac Electrophysiology Report      Emerald Santoyo  pdf     Signatures   Electronically signed by : Staci Jacobs, ; Oct 31 2017  2:47PM EST                       (Author)    Electronically signed by : LAUREL Warren ; Oct 31 2017  4:09PM EST                       (Author)    "

## 2018-03-10 PROBLEM — M17.12 OSTEOARTHRITIS OF LEFT KNEE: Status: ACTIVE | Noted: 2018-01-02

## 2018-03-12 ENCOUNTER — OFFICE VISIT (OUTPATIENT)
Dept: INTERNAL MEDICINE CLINIC | Facility: CLINIC | Age: 78
End: 2018-03-12
Payer: MEDICARE

## 2018-03-12 VITALS
TEMPERATURE: 97.9 F | WEIGHT: 236.4 LBS | RESPIRATION RATE: 18 BRPM | BODY MASS INDEX: 40.36 KG/M2 | SYSTOLIC BLOOD PRESSURE: 134 MMHG | HEART RATE: 78 BPM | HEIGHT: 64 IN | OXYGEN SATURATION: 97 % | DIASTOLIC BLOOD PRESSURE: 84 MMHG

## 2018-03-12 DIAGNOSIS — I48.91 ATRIAL FIBRILLATION, UNSPECIFIED TYPE (HCC): ICD-10-CM

## 2018-03-12 DIAGNOSIS — G62.89 OTHER POLYNEUROPATHY: ICD-10-CM

## 2018-03-12 DIAGNOSIS — K21.9 GASTROESOPHAGEAL REFLUX DISEASE WITHOUT ESOPHAGITIS: Primary | ICD-10-CM

## 2018-03-12 DIAGNOSIS — E55.9 VITAMIN D DEFICIENCY: ICD-10-CM

## 2018-03-12 DIAGNOSIS — R73.01 ABNORMAL FASTING GLUCOSE: ICD-10-CM

## 2018-03-12 DIAGNOSIS — L98.9 SKIN LESION: ICD-10-CM

## 2018-03-12 DIAGNOSIS — IMO0001 CLASS 3 OBESITY DUE TO EXCESS CALORIES WITH SERIOUS COMORBIDITY AND BODY MASS INDEX (BMI) OF 40.0 TO 44.9 IN ADULT: ICD-10-CM

## 2018-03-12 DIAGNOSIS — Z00.00 HEALTH MAINTENANCE EXAMINATION: ICD-10-CM

## 2018-03-12 PROBLEM — M21.40 FLAT FOOT: Status: ACTIVE | Noted: 2017-08-29

## 2018-03-12 PROBLEM — K22.719 BARRETT'S ESOPHAGUS WITH DYSPLASIA: Status: ACTIVE | Noted: 2018-03-12

## 2018-03-12 PROBLEM — R92.2 DENSE BREAST TISSUE ON MAMMOGRAM: Status: ACTIVE | Noted: 2017-12-07

## 2018-03-12 PROBLEM — B35.1 ONYCHOMYCOSIS: Status: ACTIVE | Noted: 2017-08-29

## 2018-03-12 PROBLEM — R92.30 DENSE BREAST TISSUE ON MAMMOGRAM: Status: ACTIVE | Noted: 2017-12-07

## 2018-03-12 PROBLEM — R26.2 DIFFICULTY WALKING: Status: ACTIVE | Noted: 2017-08-29

## 2018-03-12 PROCEDURE — G0439 PPPS, SUBSEQ VISIT: HCPCS | Performed by: INTERNAL MEDICINE

## 2018-03-12 PROCEDURE — 99214 OFFICE O/P EST MOD 30 MIN: CPT | Performed by: INTERNAL MEDICINE

## 2018-03-12 NOTE — PROGRESS NOTES
HPI:  Caryle Kitchen is a 78 y o  female here for her Subsequent Wellness Visit      Patient Active Problem List   Diagnosis    Hiatal hernia    Atrial fibrillation (HCC) [I48 91]    Acquired deformity of foot    Corns    Diabetic polyneuropathy associated with type 2 diabetes mellitus ([facility]    Pain in both feet    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[facility]    Radiculopathy of lumbar region    Primary osteoarthritis of both knees    Sensory polyneuropathy    RLS (restless legs syndrome)    Arthritis    Esophageal reflux    Hypertension    Lichen sclerosus et atrophicus    Multiple lung nodules    Obesity    Osteoarthritis of left knee    Osteopenia    Peripheral neuropathy    Plantar fasciitis of left foot    Abnormal fasting glucose    Vitamin D deficiency     Past Medical History:   Diagnosis Date    Atrial fibrillation ([facility]     Carrero's esophagus     last assessed: 1/23/2018    Breast cancer ([facility] )     stage 1 (left), given adjuvant radiation with Arimidex x 5 years    Cancer [facility])     Left Breast, Lumpectomy    Cataract     last assessed: 3/11/2014    Dysplasia of toenail     last assessed: 8/29/2017    Esophageal reflux     GERD (gastroesophageal reflux disease)     Gross hematuria     last assessed: 2/19/2015    Hematuria     Hiatal hernia     Hypertension     Irregular heart beat     AFIB    Mixed sensory-motor polyneuropathy     Neuropathy     Pacemaker     Paroxysmal atrial fibrillation (HCC)     Peripheral neuropathy     Rectal bleeding     Restless leg syndrome     Shortness of breath     last assessed: 1/11/2016     Past Surgical History:   Procedure Laterality Date    BREAST LUMPECTOMY Left     onset: 2006    BREAST SURGERY      CARDIAC PACEMAKER PLACEMENT      x 3 2006    CATARACT EXTRACTION      CYSTOSCOPY  04/04/2014    diagnostic    HYSTERECTOMY      ALISON BSO; due to fibroid uterus; age 38   [facility] KNEE CARTILAGE SURGERY      excision lesion of meniscus or capsule knee    KNEE SURGERY      OTHER SURGICAL HISTORY      radiation therapy    [place] COLONOSCOPY FLX DX W/COLLJ SPEC WHEN PFRMD N/A 2/8/2017    Procedure: COLONOSCOPY;  Surgeon: Elliott Multani MD;  Location: [facility]; Service: Gastroenterology    [place] ESOPHAGOGASTRODUODENOSCOPY TRANSORAL DIAGNOSTIC N/A 9/20/2017    Procedure: ESOPHAGOGASTRODUODENOSCOPY (EGD); Surgeon: Yamilet Mitchell MD;  Location: [facility]; Service: Gastroenterology     Family History   Problem Relation Age of Onset    Hypertension Mother     Heart disease Father     Aneurysm Father     Coronary artery disease Father      in his 76s with aneurysm    Aortic aneurysm Father      abdominal    Scleroderma Sister     Breast cancer Sister     Hypertension Sister     No Known Problems Son     Testicular cancer Son     Thyroid cancer Son     Colon cancer Maternal Aunt     Colon cancer Maternal Aunt      History   Smoking Status    Former Smoker    Years: 25 00    Quit date: 9/20/1980   Smokeless Tobacco    Never Used     Comment: Quit over 30 years ago; quit age 41     History   Alcohol Use    Yes     Comment: rarely; being a social drinker (as per allscripts)      History   Drug Use No     Review of Systems   Constitutional: Negative for appetite change and fatigue  HENT: Negative for congestion and ear pain  Eyes: Negative for visual disturbance  Respiratory: Negative for cough and shortness of breath  Cardiovascular: Negative for chest pain, palpitations and leg swelling  Gastrointestinal: Negative for abdominal pain, constipation and diarrhea  Genitourinary: Negative for dysuria, frequency and urgency  Musculoskeletal: Positive for arthralgias and gait problem  Negative for myalgias  Skin: Negative for rash and wound  Neurological: Negative for dizziness, numbness and headaches  Hematological: Does not bruise/bleed easily  Psychiatric/Behavioral: Negative for confusion  The patient is not nervous/anxious  /84   Pulse 78   Temp 97 9 °F (36 6 °C)   Resp 18   Ht 5' 4" (1 626 m)   Wt 107 kg (236 lb 6 4 oz)   SpO2 97%   BMI 40 58 kg/m²       Current Outpatient Prescriptions   Medication Sig Dispense Refill    Calcium Acetate, Phos Binder, (CALCIUM ACETATE PO) Take by mouth daily        clobetasol (TEMOVATE) 0 05 % cream Apply topically 2 (two) times a week      gabapentin (NEURONTIN) 800 mg tablet Take 1 tablet (800 mg total) by mouth 4 (four) times a day 360 tablet 1    lisinopril (ZESTRIL) 40 mg tablet TAKE 1 TABLET BY MOUTH  EVERY DAY AS DIRECTED 90 tablet 3    metoprolol tartrate (LOPRESSOR) 50 mg tablet Take 50 mg by mouth 2 (two) times a day      multivitamin (THERAGRAN) TABS Take 1 tablet by mouth daily      pramipexole (MIRAPEX) 0 5 mg tablet 1 and 1/2 tab 5pm &10 pm 135 tablet 1    PRAMIPEXOLE DIHYDROCHLORIDE PO Take 5 mg by mouth 2 (two) times a day 1 1/2 tabs x 2 daily      Probiotic Product (PROBIOTIC PO) Take by mouth      warfarin (COUMADIN) 5 mg tablet Take 7 5 mg by mouth daily        mupirocin (BACTROBAN) 2 % ointment Apply topically 3 (three) times a day prn 30 g 1    ranitidine (ZANTAC) 150 mg tablet Take 1 tablet by mouth as needed         No current facility-administered medications for this visit        Allergies   Allergen Reactions    Cephalexin     Erythromycin     Penicillins     Sulfa Antibiotics     Duloxetine Hcl Other (See Comments)     Facial pins and needles sensation    Levofloxacin Other (See Comments)     Muscular aches     Immunization History   Administered Date(s) Administered    Influenza Split High Dose Preservative Free IM 09/29/2016, 09/11/2017    Pneumococcal Conjugate 13-Valent 11/11/2015    Pneumococcal Polysaccharide PPV23 1942    Td (adult), adsorbed 09/01/1990    Zoster 01/01/2012       Patient Care Team:  Luna Teague MD as PCP - General  MD Henrique Amaya MD Dail Bimler, MD Sheria Poster, MD Lesa Boswell MD      Medicare Screening Tests and Risk Assessments:  AWV Clinical     ISAR:   Previous hospitalizations?:  No       Once in a Lifetime Medicare Screening:       Medicare Screening Tests and Risk Assessment:   AAA Risk Assessment     Family history of AAA:  Yes   Osteoporosis Risk Assessment    HIV Risk Assessment        Drug and Alcohol Use:   Tobacco use    Cigarettes:  former smoker    Quit date:  3/12/1988   Tobacco use duration    Tobacco Cessation Readiness    Alcohol use    Alcohol use:  never    Alcohol Treatment Readiness   Illicit Drug Use    Drug use:  never        Diet & Exercise:   Diet   What is your diet?:  Regular, No Added Salt   How many servings a day of the following:   Fruits and Vegetables:  3-4 Meat:  1-2   Whole Grains:  1    Coffee:  2    Exercise    Do you currently exercise?:  yes    Frequency:  occasional    Type of exercise:  walking       Cognitive Impairment Screening:   Depression screening preformed:  Yes Depression screen score:  0   Depression screening results:  no significant symptoms   Cognitive Impairment Screening    Do you have difficulty learning or retaining new information?:  Yes Do you have difficulty handling new tasks?:  No   Do you have difficulty with reasoning?:  No Do you have difficulty with spatial ability and orientation?:  No   Do you have difficulty with language?:  No Do you have difficulty with behavior?:  No       Functional Ability/Level of Safety:   Hearing     Bilateral:  slightly decreased   Hearing Impairment Assessment    Current Activities    Status:  unlimited ADL's, unlimited IADL's, unlimited social activities, unlimited driving   Help needed with the folllowing:    Using the phone:  No Transportation:  No   Shopping:  No Preparing Meals:  No   Doing Housework:  No Doing Laundry:  No    Managing Money:  No   ADL    Fall Risk   Have you fallen in the last 12 months?:  No    Injury History       Home Safety: Home Safety Risk Factors   Unfamilar with surroundings:  No Uneven floors:  No   Stairs or handrail saftey risk:  No Loose rugs:  No   Household clutter:  No Poor household lighting:  No   No grab bars in bathroom:  No Further evaluation needed:  No       Advanced Directives:   Advanced Directives    Patient's End of Life Decisions        Urinary Incontinence:       Glaucoma:            Provider Screening     Preventative Screening/Counseling:   Cardiovascular Screening/Counseling:   (Labs Q5 years, EKG optional one-time)         Diabetes Screening/Counseling:   (2 tests/year if Pre-Diabetes or 1 test/year if no Diabetes)         Colorectal Cancer Screening/Counseling:   (FOBT Q1 yr; Flex Sig Q4 yrs or Q10 yrs after Screening Colonoscopy; Screening Colonoscpy Q2 yrs High Risk or Q10 yrs Low Risk; Barium Enema Q2 yrs High Risk or Q4 yrs Low Risk)         Prostate Cancer Screening/Counseling:   (Annual)          Breast Cancer Screening/Counseling:   (Baseline Age 28 - 43; Annual Age 36+)         Cervical Cancer Screening/Counseling:   (Annual for High Risk or Childbearing Age with Abnormal Pap in Last 3 yrs; Every 2 all others)         Osteoporosis Screening/Counseling:   (Every 2 Yrs if at risk or more if medically necessary)         AAA Screening/Counseling:   (Once per Lifetime with risk factors)    Family History of AAA:  Yes           Glaucoma Screening/Counseling:   (Annual)         HIV Screening/Counseling:   (Voluntary; Once annually for high risk OR 3 times for Pregnancy at diagnosis of IUP; 3rd trimester; and at Labor         Hepatitis C Screening:             Immunizations:        Other Preventative Couseling (Non-Medicare Wellness Visit Required):       Referrals (Non-Medicare Wellness Visit Required):       Medical Equipment/Suppliers:           No exam data present  Reviewed Updated [facility] Prior Wellness Visits:   Last Medicare wellness visit information was reviewed, patient interviewed , no change since last AWVyes  Last Medicare wellness visit information was reviewed, patient interviewed and updates made to the record today yes    Assessment and Plan:  1  Gastroesophageal reflux disease without esophagitis     2  Atrial fibrillation, unspecified type ([address] )  Basic metabolic panel   3  Other polyneuropathy  Vitamin B12   4  Vitamin D deficiency  Vitamin D 25 hydroxy   5  Abnormal fasting glucose  HEMOGLOBIN A1C W/ EAG ESTIMATION   6   Skin lesion  mupirocin (BACTROBAN) 2 % ointment       Health Maintenance Due   Topic Date Due    URINE MICROALBUMIN  12/24/1952    Depression Screening PHQ-9  12/24/1954    DTaP,Tdap,and Td Vaccines (1 - Tdap) 09/02/1990    Fall Risk  12/24/2007    Urinary Incontinence Screening  12/24/2007    GLAUCOMA SCREENING 67+ YR  12/24/2009    OPHTHALMOLOGY EXAM  03/10/2018

## 2018-03-12 NOTE — ASSESSMENT & PLAN NOTE
Congratulated with 8 lb weight loss since last visit  Limit junk food, encouraged to walk more often

## 2018-03-12 NOTE — PROGRESS NOTES
Assessment/Plan:    Peripheral neuropathy  On gabapentin  RLS (restless legs syndrome)  Recently saw Neurology, on pramipexole  Atrial fibrillation ([facility] ) [I48 91]  Doing well, no symptoms  Lipids at goal   Warfarin per Cardiology  Hypertension  BP controlled on metoprolol and lisinopril  Osteopenia  Bone density updated  Encouraged to take vitamin-D regularly, takes calcium supplements also  Esophageal reflux  Off PPI and H2 blockers  Class 3 obesity due to excess calories with serious comorbidity and body mass index (BMI) of 40 0 to 44 9 in [facility])  Congratulated with 8 lb weight loss since last visit  Limit junk food, encouraged to walk more often  Diagnoses and all orders for this visit:    Gastroesophageal reflux disease without esophagitis    Atrial fibrillation, unspecified type ([facility] )  -     Basic metabolic panel; Future    Other polyneuropathy  -     Vitamin B12; Future    Vitamin D deficiency  -     Vitamin D 25 hydroxy; Future    Abnormal fasting glucose  -     HEMOGLOBIN A1C W/ EAG ESTIMATION; Future    Skin lesion  -     mupirocin (BACTROBAN) 2 % ointment; Apply topically 3 (three) times a day prn    Class 3 obesity due to excess calories with serious comorbidity and body mass index (BMI) of 40 0 to 44 9 in [facility])    Health counseling  Comments:  Colonoscopy 2020  Other orders  -     Probiotic Product (PROBIOTIC PO); Take by mouth          Subjective:      Patient ID: Karly Douglas is a 78 y o  female  Mrs Ortiz has been feeling well  She reports that her left leg pain has been better in the past 2 weeks  She has intermittent low back pain, described as mild and nonradiating  Pain does not disrupt sleep  She has chronic knee pain, finds it difficult to walk too long  She does not take any pain medications for this  She reports cutting back on junk food and sweets, stopped weight watchers a few months ago          The following portions of the patient's history were reviewed and updated as appropriate: allergies, current medications, past medical history, past social history and problem list     Review of Systems   Constitutional: Negative for appetite change and fatigue  HENT: Negative for congestion and ear pain  Eyes: Negative for visual disturbance  Respiratory: Negative for cough and shortness of breath  Cardiovascular: Negative for chest pain, palpitations and leg swelling  Gastrointestinal: Negative for abdominal pain, constipation and diarrhea  Genitourinary: Negative for dysuria, frequency and urgency  Musculoskeletal: Positive for arthralgias and gait problem  Negative for myalgias  Skin: Negative for rash and wound  Neurological: Negative for dizziness, numbness and headaches  Hematological: Does not bruise/bleed easily  Psychiatric/Behavioral: Negative for confusion  The patient is not nervous/anxious  Objective:      /84   Pulse 78   Temp 97 9 °F (36 6 °C)   Resp 18   Ht 5' 4" (1 626 m)   Wt 107 kg (236 lb 6 4 oz)   SpO2 97%   BMI 40 58 kg/m²          Physical Exam   Constitutional: She is oriented to person, place, and time  She appears well-developed and well-nourished  HENT:   Head: Normocephalic and atraumatic  Nose: Nose normal    Eyes: Conjunctivae are normal  Pupils are equal, round, and reactive to light  Neck: Neck supple  Cardiovascular: Normal rate, regular rhythm and normal heart sounds  Minimal edema bilateral   No signs of inflammation pacemaker site, left upper chest    Pulmonary/Chest: Effort normal and breath sounds normal  She has no wheezes  She has no rales  Abdominal: Soft  Bowel sounds are normal    Neurological: She is alert and oriented to person, place, and time  Skin: Skin is warm  No rash noted  Psychiatric: She has a normal mood and affect  Her behavior is normal    Nursing note and vitals reviewed

## 2018-04-05 DIAGNOSIS — G25.81 RLS (RESTLESS LEGS SYNDROME): ICD-10-CM

## 2018-04-05 DIAGNOSIS — G60.8 SENSORY POLYNEUROPATHY: ICD-10-CM

## 2018-04-09 ENCOUNTER — TRANSCRIBE ORDERS (OUTPATIENT)
Dept: ADMINISTRATIVE | Facility: HOSPITAL | Age: 78
End: 2018-04-09

## 2018-04-09 ENCOUNTER — ANTICOAG VISIT (OUTPATIENT)
Dept: CARDIOLOGY CLINIC | Facility: CLINIC | Age: 78
End: 2018-04-09

## 2018-04-09 ENCOUNTER — APPOINTMENT (OUTPATIENT)
Dept: LAB | Facility: HOSPITAL | Age: 78
End: 2018-04-09
Payer: MEDICARE

## 2018-04-09 DIAGNOSIS — I48.91 ATRIAL FIBRILLATION, UNSPECIFIED TYPE (HCC): ICD-10-CM

## 2018-04-09 DIAGNOSIS — I48.0 PAROXYSMAL ATRIAL FIBRILLATION (HCC): ICD-10-CM

## 2018-04-09 LAB
INR PPP: 2.09 (ref 0.86–1.16)
PROTHROMBIN TIME: 22.1 SECONDS (ref 9.4–11.7)

## 2018-04-09 PROCEDURE — 85610 PROTHROMBIN TIME: CPT

## 2018-04-09 PROCEDURE — 36415 COLL VENOUS BLD VENIPUNCTURE: CPT

## 2018-04-11 ENCOUNTER — OFFICE VISIT (OUTPATIENT)
Dept: OBGYN CLINIC | Facility: CLINIC | Age: 78
End: 2018-04-11
Payer: MEDICARE

## 2018-04-11 VITALS
BODY MASS INDEX: 40.67 KG/M2 | WEIGHT: 238.2 LBS | SYSTOLIC BLOOD PRESSURE: 108 MMHG | HEIGHT: 64 IN | HEART RATE: 89 BPM | DIASTOLIC BLOOD PRESSURE: 74 MMHG

## 2018-04-11 DIAGNOSIS — M25.562 CHRONIC PAIN OF LEFT KNEE: Primary | ICD-10-CM

## 2018-04-11 DIAGNOSIS — G89.29 CHRONIC PAIN OF LEFT KNEE: Primary | ICD-10-CM

## 2018-04-11 DIAGNOSIS — M17.0 PRIMARY OSTEOARTHRITIS OF BOTH KNEES: ICD-10-CM

## 2018-04-11 PROCEDURE — 99214 OFFICE O/P EST MOD 30 MIN: CPT | Performed by: ORTHOPAEDIC SURGERY

## 2018-04-11 PROCEDURE — 20610 DRAIN/INJ JOINT/BURSA W/O US: CPT | Performed by: ORTHOPAEDIC SURGERY

## 2018-04-11 RX ORDER — TRIAMCINOLONE ACETONIDE 40 MG/ML
40 INJECTION, SUSPENSION INTRA-ARTICULAR; INTRAMUSCULAR
Status: COMPLETED | OUTPATIENT
Start: 2018-04-11 | End: 2018-04-11

## 2018-04-11 RX ORDER — BUPIVACAINE HYDROCHLORIDE 5 MG/ML
6 INJECTION, SOLUTION EPIDURAL; INTRACAUDAL
Status: COMPLETED | OUTPATIENT
Start: 2018-04-11 | End: 2018-04-11

## 2018-04-11 RX ADMIN — BUPIVACAINE HYDROCHLORIDE 6 ML: 5 INJECTION, SOLUTION EPIDURAL; INTRACAUDAL at 13:45

## 2018-04-11 RX ADMIN — TRIAMCINOLONE ACETONIDE 40 MG: 40 INJECTION, SUSPENSION INTRA-ARTICULAR; INTRAMUSCULAR at 13:45

## 2018-04-11 NOTE — PROGRESS NOTES
Assessment/Plan:  1  Chronic pain of left knee  Ambulatory referral to Physical Therapy   2  Primary osteoarthritis of both knees  Ambulatory referral to Physical Therapy     Patient is here as a referral from my partner Dr Laura Mckeon for my subspecialty of joint replacement surgery  She is here to have her left knee evaluated as this is the more painful of the 2  She states that her left knee continues to give her problems and her right knee currently is having his pain well controlled with prior viscosupplementation injections received in February  After history, clinical exam, review of her x-rays feel that she is suffering from mild to moderate osteoarthritis of her left knee mostly affecting her patellofemoral joint and medial compartment  We discussed conservative treatment options for this as I do not feel that she is indicated for knee replacement surgery at this point  She has not had a steroid injection in the knee that she can recall therefore recommended she undergo a steroid injection in her left knee today  After risks and benefits were discussed and verbal consent was obtained she underwent a left knee steroid injection here in the office today and tolerated well  Post-injection instructions were provided  We also discussed the benefit of physical therapy and I gave her prescription for physical therapy to address both of her knees which have osteoarthritis  We also discussed the importance of activity modification and weight loss efforts  Questions were addressed and I would like to see her back in approximately 3 months time to follow-up on the above treatment      Large joint arthrocentesis  Date/Time: 4/11/2018 1:45 PM  Consent given by: patient  Site marked: site marked  Timeout: Immediately prior to procedure a time out was called to verify the correct patient, procedure, equipment, support staff and site/side marked as required   Supporting Documentation  Indications: pain   Procedure Details  Location: knee - L knee  Preparation: Patient was prepped and draped in the usual sterile fashion  Needle size: 20 G  Ultrasound guidance: no  Approach: anterolateral  Medications administered: 6 mL bupivacaine (PF) 0 5 %; 40 mg triamcinolone acetonide 40 mg/mL    Patient tolerance: patient tolerated the procedure well with no immediate complications  Dressing:  Sterile dressing applied        Subjective:  Left knee pain    Patient ID: Yair Alvarenga is a 78 y o  female  HPI  Patient is a very pleasant 19-year-old female sent as a consultation request my partner Dr Serg Magana for my subspecialty of joint replacement surgery  She states that she has had bilateral knee pain for many years however since September of last year her left knee has been progressively getting worse  She does have a history of a medial meniscus tear in left knee which was treated with surgery by [facility]  This was many years ago  Currently she states her pain is anterior and anterior medial in the knee with an occasional radiation of the sharp pain to the posterior aspect of the knee  Pain ranges from 03/10 to 7/10 on a daily basis  She states the pain is sharp in nature  Pain is exacerbated with going up and down steps, standing for long periods of time and change position after long periods of sitting still  She denies any swelling or mechanical symptoms  She currently takes Coumadin for AFib and cannot take anti-inflammatory medication  She takes an occasional Tylenol and states sometimes this helps and sometimes this does not  She underwent a series of bilateral knee viscosupplementation injections by my partner Dr Serg Magana in February 2018 and this helped her right knee feel better however and did not help her left knee  She denies a history of having a steroid injection in her left knee  Review of Systems   Constitutional: Positive for activity change  HENT: Negative  Eyes: Negative  Respiratory: Negative  Cardiovascular: Negative  Gastrointestinal: Negative  Endocrine: Negative  Musculoskeletal: Positive for arthralgias  Skin: Negative  Neurological: Negative  Psychiatric/Behavioral: Negative  Past Medical History:   Diagnosis Date    Atrial fibrillation ([facility] [address] )     Carrero's esophagus     last assessed: 1/23/2018    [facility]     stage 1 (left), given adjuvant radiation with Arimidex x 5 years    Cancer [facility])     Left Breast, Lumpectomy    Cataract     last assessed: 3/11/2014    Dysplasia of toenail     last assessed: 8/29/2017    Esophageal reflux     GERD (gastroesophageal reflux disease)     Gross hematuria     last assessed: 2/19/2015    Hematuria     Hiatal hernia     Hypertension     Irregular heart beat     AFIB    Mixed sensory-motor polyneuropathy     Neuropathy     Pacemaker     Paroxysmal atrial fibrillation (HCC)     Peripheral neuropathy     Rectal bleeding     Restless leg syndrome     Shortness of breath     last assessed: 1/11/2016       Past Surgical History:   Procedure Laterality Date    BREAST LUMPECTOMY Left     onset: 2006    BREAST SURGERY      CARDIAC PACEMAKER PLACEMENT      x 3 2006    CATARACT EXTRACTION      CYSTOSCOPY  04/04/2014    diagnostic    HYSTERECTOMY      ALISON BSO; due to fibroid uterus; age 38   [facility] KNEE CARTILAGE SURGERY      excision lesion of meniscus or capsule knee    KNEE SURGERY      OTHER SURGICAL HISTORY      radiation therapy    OH COLONOSCOPY FLX DX W/COLLJ SPEC WHEN PFRMD N/A 2/8/2017    Procedure: COLONOSCOPY;  Surgeon: Petty Camacho MD;  [facility]: [facility]; Service: Gastroenterology    OH ESOPHAGOGASTRODUODENOSCOPY TRANSORAL DIAGNOSTIC N/A 9/20/2017    Procedure: ESOPHAGOGASTRODUODENOSCOPY (EGD); Surgeon: Donald Rodrigues MD;  Location: [facility];   Service: Gastroenterology       Family History   Problem Relation Age of Onset    Hypertension Mother     Heart disease Father  Aneurysm Father     Coronary artery disease Father      in his 76s with aneurysm    Aortic aneurysm Father      abdominal    Scleroderma Sister     Breast cancer Sister     Hypertension Sister     No Known Problems Son     Testicular cancer Son     Thyroid cancer Son     Colon cancer Maternal Aunt     Colon cancer Maternal Aunt        Social History     Occupational History    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     Social History Main Topics    Smoking status: Former Smoker     Years: 25 00     Quit date: 9/20/1980    Smokeless tobacco: Never Used      Comment: Quit over 30 years ago; quit age 41    Alcohol use Yes      Comment: rarely; being a social drinker (as per allscripts)    Drug use: No    Sexual activity: Not on file         Current Outpatient Prescriptions:     Calcium Acetate, Phos Binder, (CALCIUM ACETATE PO), Take by mouth daily  , Disp: , Rfl:     clobetasol (TEMOVATE) 0 05 % cream, Apply topically 2 (two) times a week, Disp: , Rfl:     gabapentin (NEURONTIN) 800 mg tablet, Take 1 tablet (800 mg total) by mouth 4 (four) times a day, Disp: 360 tablet, Rfl: 1    lisinopril (ZESTRIL) 40 mg tablet, TAKE 1 TABLET BY MOUTH  EVERY DAY AS DIRECTED, Disp: 90 tablet, Rfl: 3    metoprolol tartrate (LOPRESSOR) 50 mg tablet, Take 50 mg by mouth 2 (two) times a day, Disp: , Rfl:     multivitamin (THERAGRAN) TABS, Take 1 tablet by mouth daily, Disp: , Rfl:     mupirocin (BACTROBAN) 2 % ointment, Apply topically 3 (three) times a day prn, Disp: 30 g, Rfl: 1    pramipexole (MIRAPEX) 0 5 mg tablet, 1 and 1/2 tab 5pm &10 pm, Disp: 135 tablet, Rfl: 1    Probiotic Product (PROBIOTIC PO), Take by mouth, Disp: , Rfl:     warfarin (COUMADIN) 5 mg tablet, Take 7 5 mg by mouth daily  , Disp: , Rfl:     PRAMIPEXOLE DIHYDROCHLORIDE PO, Take 5 mg by mouth 2 (two) times a day 1 1/2 tabs x 2 daily, Disp: , Rfl:     ranitidine (ZANTAC) 150 mg tablet, Take 1 tablet by mouth as needed  , Disp: , Rfl:     Allergies   Allergen Reactions    Cephalexin     Erythromycin     Penicillins     Savella [Milnacipran]     Sulfa Antibiotics     Duloxetine Hcl Other (See Comments)     Facial pins and needles sensation    Levofloxacin Other (See Comments)     Muscular aches       Objective:  Vitals:    04/11/18 1317   BP: 108/74   Pulse: 89       Body mass index is 40 89 kg/m²  Right Knee Exam     Other   Other tests: no effusion present      Left Knee Exam     Tenderness   The patient is experiencing tenderness in the patella and medial joint line  Range of Motion   Extension: 0   Flexion: 110     Tests   Ned:  Medial - negative Lateral - negative  Drawer:       Anterior - negative     Posterior - negative  Varus: negative  Valgus: negative  Patellar Apprehension: negative    Other   Erythema: absent  Scars: absent  Sensation: normal  Pulse: present  Swelling: none  Effusion: no effusion present    Comments:  Crepitance on AROM and PROM  - PFG  No increased warmth of knee  Knee is stable at 0, 30, 90 degrees          Observations   Left Knee   Negative for effusion  Right Knee   Negative for effusion  Physical Exam   Constitutional: She is oriented to person, place, and time  She appears well-developed  HENT:   Head: Atraumatic  Eyes: EOM are normal    Neck: Neck supple  Cardiovascular: Normal rate  Pulmonary/Chest: Effort normal    Musculoskeletal:        Right knee: She exhibits no effusion  Left knee: She exhibits no effusion  See orthopedic exam   Neurological: She is alert and oriented to person, place, and time  Skin: Skin is warm and dry  Psychiatric: She has a normal mood and affect  Nursing note and vitals reviewed  I have personally reviewed pertinent films in PACS  X-rays of her left knee obtained in January of 2018 reviewed by me    They demonstrate mild to moderate osteoarthritis and varus pattern of the left knee with joint space narrowing sclerosis and small osteophyte formation mostly in the patellofemoral joint  Juan Manuel MELENDEZ    Division of Adult Reconstruction  Department of [facility]

## 2018-04-12 RX ORDER — PRAMIPEXOLE DIHYDROCHLORIDE 0.5 MG/1
TABLET ORAL
Qty: 270 TABLET | Refills: 0 | Status: SHIPPED | OUTPATIENT
Start: 2018-04-12 | End: 2018-06-13 | Stop reason: SDUPTHER

## 2018-04-12 RX ORDER — GABAPENTIN 800 MG/1
TABLET ORAL
Qty: 360 TABLET | Refills: 0 | Status: SHIPPED | OUTPATIENT
Start: 2018-04-12 | End: 2018-06-27 | Stop reason: SDUPTHER

## 2018-04-17 ENCOUNTER — OFFICE VISIT (OUTPATIENT)
Dept: PODIATRY | Facility: CLINIC | Age: 78
End: 2018-04-17
Payer: MEDICARE

## 2018-04-17 VITALS
HEIGHT: 64 IN | DIASTOLIC BLOOD PRESSURE: 96 MMHG | BODY MASS INDEX: 40.63 KG/M2 | WEIGHT: 238 LBS | SYSTOLIC BLOOD PRESSURE: 163 MMHG

## 2018-04-17 DIAGNOSIS — E11.42 DIABETIC POLYNEUROPATHY ASSOCIATED WITH TYPE 2 DIABETES MELLITUS (HCC): Primary | ICD-10-CM

## 2018-04-17 DIAGNOSIS — L84 CORNS: ICD-10-CM

## 2018-04-17 DIAGNOSIS — I70.209 PERIPHERAL ARTERIOSCLEROSIS (HCC): ICD-10-CM

## 2018-04-17 DIAGNOSIS — M79.671 PAIN IN BOTH FEET: ICD-10-CM

## 2018-04-17 DIAGNOSIS — M79.672 PAIN IN BOTH FEET: ICD-10-CM

## 2018-04-17 PROCEDURE — 11056 PARNG/CUTG B9 HYPRKR LES 2-4: CPT | Performed by: PODIATRIST

## 2018-04-17 NOTE — PROGRESS NOTES
Assessment/Plan:  Painful calluses  Diabetic neuropathy  Peripheral vascular disease  Plan  Diabetic foot exam performed  All mycotic nails debrided  Plantar calluses debrided without pain or complication    No problem-specific Assessment & Plan notes found for this encounter  Diagnoses and all orders for this visit:    Diabetic polyneuropathy associated with type 2 diabetes mellitus ([address] )    Corns    Pain in both feet    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[facility] [address] )      Discussion/Summary  The patient was counseled regarding instructions for management,-- prognosis,-- patient and family education,-- risks and benefits of treatment options,-- importance of compliance with treatment  Patient is able to Self-Care  Possible side effects of new medications were reviewed with the patient/guardian today  The treatment plan was reviewed with the patient/guardian  The patient/guardian understands and agrees with the treatment plan      Chief Complaint  Routine nail care      History of Present Illness  HPI: Patient is doing well with Cymbalta however she began have facial tingling  She discontinued medicine   However the medication was relieving her neuropathic pain       Review of Systems     ROS reviewed       Active Problems     1  Achilles tendinitis of left lower extremity (726 71) (M76 62)   2  Acquired ankle/foot deformity (736 70) (M21 969)   3  AF (paroxysmal atrial fibrillation) (427 31) (I48 0)   4  Anticoagulant long-term use (V58 61) (Z79 01)   5  Arthritis (716 90) (M19 90)   6  At risk for bone density loss (V49 89) (Z91 89)   7  Atrial fibrillation (427 31) (I48 91)   8  History of Breast Cancer (V10 3)   9  Difficulty walking (719 7) (R26 2)   10  Encounter for screening mammogram for breast cancer (V76 12) (Z12 31)   11  Esophageal reflux (530 81) (K21 9)   12  Foot pain, bilateral (729 5) (M79 671,M79 672)   13  Hiatal hernia (553 3) (K44 9)   14  History of Carrero's esophagus (V12 79) (Z87 19)   15  History of fall (V15 88) (Z91 81)   16  Hypertension (401 9) (I10)   17  Leg pain, left (729 5) (E07 743)   18  Lichen sclerosus et atrophicus (701 0) (L90 0)   19  Lumbar radiculopathy (724 4) (M54 16)   20  Multiple lung nodules (793 19) (R91 8)   21  Obesity (278 00) (E66 9)   22  Onychomycosis (110 1) (B35 1)   23  Osteopenia (733 90) (M85 80)   24  Pacemaker (V45 01) (Z95 0)   25  Peripheral neuropathy (356 9) (G62 9)   26  Pes planus of both feet (734) (M21 41,M21 42)   27  Plantar fasciitis of left foot (728 71) (M72 2)   28  Restless legs syndrome (333 94) (G25 81)     Past Medical History   · History of Abnormal glucose (790 29) (R73 09)   · Atrial fibrillation (427 31) (I48 91)   · History of Breast Cancer (V10 3)   · 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)   · Esophageal reflux (530 81) (K21 9)   · Denied: History of Exposure To STD   · History of Gross hematuria (599 71) (R31 0)   · History of Hematoma (924 9) (T14 8XXA)   · History of Hematoma of lower extremity, right, initial encounter (924 5) (S80 11XA)   · History of backache (V13 59) (Z87 39)   · History of Carrero's esophagus (V12 79) (Z87 19)   · History of cataract (V12 49) (Z86 69)   · History of chest pain (V13 89) (Z87 898)   · History of fall (V15 88) (Z91 81)   · History of hematuria (V13 09) (Z87 448)   · History of postmenopausal hormone replacement therapy (V87 49) (Z92 29)   · History of shortness of breath (V13 89) (M70 031)   · History of urinary incontinence (V13 09) (Z87 898)   · History of Neck pain (723 1) (M54 2)   · Normal delivery (650) (O80,Z37  9)   · History of Right knee pain (719 46) (M25 561)   · History of Ringing In The Ears (Tinnitus) (388 30)   · History of Skin rash (782 1) (R21)   · History of Traumatic blister of right lower extremity, initial encounter (916 2) (G90 273G)   · History of UTI (lower urinary tract infection) (599 0) (N39 0)     The active problems and past medical history were reviewed and updated today       Surgical History   · Denied: History of Abnormal Pap Smear Of Cervix   · History of Breast Surgery Lumpectomy   · History of Cataract Surgery   · History of Diagnostic Cystoscopy   · History of Excision Lesion Of Meniscus Or Capsule Knee   · History of Pacemaker - Pulse Generator Replacement   · History of Pacemaker Placement   · History of Pacemaker Placement   · History of Radiation Therapy   · History of Total Abdominal Hysterectomy With Removal Of Both Ovaries     The surgical history was reviewed and updated today        Family History  Mother    · Denied: Family history of Alcoholism and drug addiction in family   · Denied: Family history of Anxiety and depression  Father    · Denied: Family history of Alcoholism and drug addiction in family   · Denied: Family history of Anxiety and depression   · Family history of Coronary Artery Disease (V17 49)   · Family history of abdominal aortic aneurysm (V17 49) (Z82 49)  Child    · Denied: Family history of Alcoholism and drug addiction in family   · Denied: Family history of Anxiety and depression  Sibling    · Denied: Family history of Alcoholism and drug addiction in family   · Denied: Family history of Anxiety and depression  Sister    · Family history of Breast Cancer (V16 3)  Maternal Aunt    · Family history of Colon Cancer (V16 0)   · Family history of Colon Cancer (V16 0)  Family History    · Denied: Family history of Diabetes Mellitus   · Denied: Family history of Stroke Syndrome     The family history was reviewed and updated today        Social History      · Being A Social Drinker   · Denied: History of Drug Use   · Former smoker (V15 82) (K72 741)   · 25 pack years, quit age 41   · Marital History - Currently    · Retired From Work   · 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  The social history was reviewed and updated today       Current Meds   1  Clobetasol Propionate 0 05 % External Ointment;  Apply to affected area twice weekly; Therapy: 04DGS1580 to (Last Rx:25Oct2017)  Requested for: 25Oct2017 Ordered   2  Gabapentin 800 MG Oral Tablet; TAKE 1 TABLET 4 TIMES DAILY; Therapy: 78VAP6103 to Recorded   3  Lisinopril 40 MG Oral Tablet; TAKE ONE TABLET BY MOUTH EVERY DAY AS DIRECTED; Therapy: 22COB1591 to (Evaluate:22Mar2018)  Requested for: 27Mar2017; Last Rx:27Mar2017 Ordered   4  Metoprolol Tartrate 50 MG Oral Tablet; TAKE 1 TABLET TWICE DAILY  Requested for: 14JKN8080; Last Rx:27Mar2017 Ordered   5  Multivitamins CAPS; TAKE 1 CAPSULE DAILY; Therapy: (Recorded:11Mar2014) to Recorded   6  Pramipexole Dihydrochloride 0 5 MG Oral Tablet; TAKE 1 5 TABLETS Bedtime; Therapy: 48LXT1324 to (Last Rx:68Rqo2044) Ordered   7  Probiotic Oral Packet; Therapy: 25Oct2017 to Recorded   8  RaNITidine HCl - 150 MG Oral Tablet; TAKE 1 TABLET AT BEDTIME; Therapy: 88QFR6639 to [id number])  Requested for: 54DNY4810; Last Rx:30Oct2017 Ordered   9  Savella 50 MG Oral Tablet; TAKE ONE TAB TWICE DAILY;  Therapy: 34BWX2825 to (Last Rx:20Nov2017)  Requested for: 20Nov2017 Ordered   10  Savella 50 MG Oral Tablet;  Therapy: 22BBK6684 to Recorded   11  Savella Titration Pack 12 5 & 25 & 50 MG Oral Miscellaneous; as directed; Norma Haste: 56TCU4924 to (Evaluate:10Oct2017)  Requested for: 26Sep2017; Last  Rx:26Sep2017 Ordered   12  Warfarin Sodium 5 MG Oral Tablet; take 1 to 1 1/2 tabs by mouth daily or sa directed; Norma Haste: 62LTH1163 to (Evaluate:23Mar2018)  Requested for: 75ZCU3986; Last  Rx:28Mar2017 Ordered     The medication list was reviewed and updated today        Allergies  1  duloxetine   2  LevoFLOXacin TABS   3  Cephalexin CAPS   4  Erythromycin Base TABS   5  Keflex TABS   6  Penicillins   7  Sulfa Drugs     Vitals       Heart Rate 78   Respiration 16   Systolic 292   Diastolic 81   Height 5 ft 4 in   Weight 233 lb    BMI Calculated 39 99   BSA Calculated 2 09      Physical Exam  Left Foot: Appearance: Normal except as noted: excessive pronation-- and-- pes planus  Tenderness: None except the lisfranc joint-- and-- medial longitudinal arch  ROM: subtalar motion was restricted  Right Foot: Appearance: Normal except as noted: excessive pronation-- and-- pes planus  Tenderness: None except the lisfranc joint-- and-- medial longitudinal arch  ROM: subtalar motion was restricted   Left Ankle: ROM: limited ROM in all planes   Right Ankle: ROM: limited ROM in all planes   Neurological Exam: performed  Light touch was decreased bilaterally  Vibratory sensation was decreased in both first metatarsophalangeal joints  Deep tendon reflexes: achilles reflex absent bilateraly-- and-- 4/5 L5 testing bilateral    Vascular Exam: performed Dorsalis pedis pulses were diminished bilaterally  Posterior tibial pulses were diminished bilaterally  Dependence rubor was present bilaterally  Capillary refill time was Negative digital hair noted, but-- between 1-3 seconds bilaterally  Toenails: All of the toenails were elongated,-- hypertrophied,-- discolored-- and--   Hyperkeratosis: present on both first toes  Shoe Gear Evaluation: performed ()  Recommendation(s): SAS style       Procedure  Nails debrided  Bilateral preulcerative lesions debrided as well  Procedure without pain or complication   Refill of Savella ordered        There are no diagnoses linked to this encounter        Subjective:       Patient ID: Allen Alba is a 78 y o  female      HPI     The following portions of the patient's history were reviewed and updated as appropriate: allergies, current medications, past family history, past medical history, past social history, past surgical history and problem list      Review of Systems       Objective:      Foot Exam     Right Foot/Ankle      Inspection and Palpation  Skin Exam: dry skin and skin intact;      Neurovascular  Dorsalis pedis: 1+  Posterior tibial: 1+        Left Foot/Ankle       Inspection and Palpation  Skin Exam: dry skin and skin intact;      Neurovascular  Dorsalis pedis: 1+  Posterior tibial: 1+        Physical Exam   Cardiovascular: Pulses are weak pulses  Pulses:       Dorsalis pedis pulses are 1+ on the right side, and 1+ on the left side  Posterior tibial pulses are 1+ on the right side, and 1+ on the left side  Feet:   Right Foot:   Skin Integrity: Positive for dry skin  Left Foot:   Skin Integrity: Positive for dry skin  Patient's shoes and socks removed  Right Foot/Ankle   Right Foot Inspection  Skin Exam: skin intact and dry skin                           Toe Exam: swelling  Sensory   Vibration: absent  Proprioception: absent   Monofilament testing: absent  Vascular     The right DP pulse is 1+  The right PT pulse is 1+       Left Foot/Ankle  Left Foot Inspection  Skin Exam: skin intact and dry skin                          Toe Exam: swelling                   Sensory   Vibration: absent  Proprioception: absent  Monofilament: absent  Vascular     The left DP pulse is 1+  The left PT pulse is 1+  Assign Risk Category:  Deformity present; Loss of protective sensation; Weak pulses       Risk: 2          Subjective:      Patient ID: Brenna William is a 78 y o  female  HPI    The following portions of the patient's history were reviewed and updated as appropriate: allergies, current medications, past family history, past medical history, past social history, past surgical history and problem list     Review of Systems      Objective: Foot Exam    Right Foot/Ankle     Inspection and Palpation  Skin Exam: callus; Neurovascular  Dorsalis pedis: 1+  Posterior tibial: 1+      Left Foot/Ankle      Inspection and Palpation  Skin Exam: callus; Neurovascular  Dorsalis pedis: 1+  Posterior tibial: 1+        Physical Exam   Cardiovascular: Pulses are weak pulses  Pulses:       Dorsalis pedis pulses are 1+ on the right side, and 1+ on the left side          Posterior tibial pulses are 1+ on the right side, and 1+ on the left side  Feet:   Right Foot:   Skin Integrity: Positive for callus  Left Foot:   Skin Integrity: Positive for callus  Patient's shoes and socks removed  Right Foot/Ankle   Right Foot Inspection  Skin Exam: callus and callus                          Toe Exam: swelling and erythema  Sensory   Vibration: diminished  Proprioception: diminished   Monofilament testing: diminished  Vascular  Capillary refills: elevated  The right DP pulse is 1+  The right PT pulse is 1+  Left Foot/Ankle  Left Foot Inspection  Skin Exam: callus                         Toe Exam: swelling                   Sensory   Vibration: diminished  Proprioception: diminished  Monofilament: diminished  Vascular  Capillary refills: elevated  The left DP pulse is 1+  The left PT pulse is 1+  Assign Risk Category:  Deformity present;  Loss of protective sensation; Weak pulses       Risk: 2

## 2018-04-23 ENCOUNTER — EVALUATION (OUTPATIENT)
Dept: PHYSICAL THERAPY | Facility: CLINIC | Age: 78
End: 2018-04-23
Payer: MEDICARE

## 2018-04-23 DIAGNOSIS — M25.562 CHRONIC PAIN OF LEFT KNEE: ICD-10-CM

## 2018-04-23 DIAGNOSIS — M17.0 PRIMARY OSTEOARTHRITIS OF BOTH KNEES: ICD-10-CM

## 2018-04-23 DIAGNOSIS — G89.29 CHRONIC PAIN OF LEFT KNEE: ICD-10-CM

## 2018-04-23 PROCEDURE — 97161 PT EVAL LOW COMPLEX 20 MIN: CPT | Performed by: PHYSICAL THERAPIST

## 2018-04-23 PROCEDURE — G8979 MOBILITY GOAL STATUS: HCPCS | Performed by: PHYSICAL THERAPIST

## 2018-04-23 PROCEDURE — G8978 MOBILITY CURRENT STATUS: HCPCS | Performed by: PHYSICAL THERAPIST

## 2018-04-23 NOTE — PROGRESS NOTES
PT Evaluation     Today's date: 2018  Patient name: Richie Devi  : 1940  MRN: [id number]  Referring provider: Trina Menjivar DO  Dx:   Encounter Diagnosis     ICD-10-CM    1  Primary osteoarthritis of both knees M17 0 Ambulatory referral to Physical Therapy   2  Chronic pain of left knee M25 562 Ambulatory referral to Physical Therapy    G89 29                   Assessment  Impairments: abnormal or restricted ROM, impaired balance, impaired physical strength, lacks appropriate home exercise program and pain with function    Assessment details: Tarun Ortizpresents with signs and symptoms consistent with Primary osteoarthritis of both knees  Chronic pain of left knee, with loss of range of motion, strength and spinal stabilization  Presents with high reactivity  Richie Devi would benefit with physical therapy to address these impairments to return to prior level of function  Understanding of Dx/Px/POC: good   Prognosis: fair    Goals  STG  Initiate HEP  Able to climb stairs without pain in 3 weeks  LTG  Independent with HEP  TUG < 11 sec  Gait Speed > 1 1 m/s  30 Sec Chair Rise Test > 12 reps    Plan  Planned therapy interventions: neuromuscular re-education, balance/weight bearing training, balance, strengthening, stretching, therapeutic exercise, gait training and home exercise program  Frequency: 2x week  Duration in visits: 12  Duration in weeks: 6  Treatment plan discussed with: patient        Subjective Evaluation    History of Present Illness  Mechanism of injury: Patient reports having bad memory  She developed left ankle pain following anti-biotic medication for a toe infection in 2017  She reports having an arthroscope to the right knee in , due to meniscal tear  She reports multiple falls due to the knees giving out  Had an injection of cortisone about 2 weeks ago to the left knee    Pain  Current pain ratin  At best pain ratin  At worst pain rating: 3  Quality: dull ache  Relieving factors: medications  Aggravating factors: stair climbing  Progression: improved    Social Support  Steps to enter house: yes  Stairs in house: yes   Lives in: multiple-level home  Lives with: spouse    Employment status: not working  Hand dominance: right    Treatments  Previous treatment: physical therapy  Current treatment: physical therapy  Patient Goals  Patient goals for therapy: decreased pain, improved balance, increased motion, increased strength and independence with ADLs/IADLs          Objective     Active Range of Motion   Left Knee   Flexion: 110 degrees   Extension: 0 degrees with pain    Right Knee   Flexion: 120 degrees   Extension: 0 degrees     Strength/Myotome Testing     Left Knee   Flexion: 3  Extension: 3    Right Knee   Flexion: 4  Extension: 4    Functional Assessment     Comments  TUG  12 63 sec  Gait Speed    96 m/s  30 sec Chair Rise Test  9 reps       Precautions HTN, DM    Specialty Daily Treatment Diary     Manual                                                     Exercise Diary  4/23       Sitting knee Ext 1x10       Standing Hip ABD, EXT 2x10                                                                                                                                                           Modalities                                  Patient instructed in HEP from [address] [place] [address] #[id number]

## 2018-04-25 ENCOUNTER — IN-CLINIC DEVICE VISIT (OUTPATIENT)
Dept: CARDIOLOGY CLINIC | Facility: CLINIC | Age: 78
End: 2018-04-25
Payer: MEDICARE

## 2018-04-25 DIAGNOSIS — I48.20 CHRONIC ATRIAL FIBRILLATION (HCC): Primary | ICD-10-CM

## 2018-04-25 DIAGNOSIS — Z95.0 CARDIAC PACEMAKER IN SITU: ICD-10-CM

## 2018-04-25 PROCEDURE — 93294 REM INTERROG EVL PM/LDLS PM: CPT | Performed by: INTERNAL MEDICINE

## 2018-04-25 PROCEDURE — 93296 REM INTERROG EVL PM/IDS: CPT | Performed by: INTERNAL MEDICINE

## 2018-04-30 ENCOUNTER — OFFICE VISIT (OUTPATIENT)
Dept: PHYSICAL THERAPY | Facility: CLINIC | Age: 78
End: 2018-04-30
Payer: MEDICARE

## 2018-04-30 DIAGNOSIS — M17.0 PRIMARY OSTEOARTHRITIS OF BOTH KNEES: Primary | ICD-10-CM

## 2018-04-30 PROCEDURE — 97110 THERAPEUTIC EXERCISES: CPT | Performed by: PHYSICAL THERAPIST

## 2018-04-30 NOTE — PROGRESS NOTES
Daily Note     Today's date: 2018  Patient name: Elisa Hebert  : 1940  MRN: [id number]  Referring provider: Jean Pabon DO  Dx:   Encounter Diagnosis     ICD-10-CM    1  Primary osteoarthritis of both knees M17 0                   Subjective: No new pain       Objective: See treatment diary below      Assessment: Tolerated treatment well  Patient demonstrated fatigue post treatment      Plan: Continue per plan of care           Precautions HTN, DM    Specialty Daily Treatment Diary     Manual                                                     Exercise Diary        Sitting knee Ext 1x10       Standing Hip ABD, EXT 2x10       Nustep   L 1 10 min      SLR Flex, ABD  2x 10      Clam shells  20x      Bridges  15x                                                                                                                          Modalities                                  Patient instructed in HEP from [address] [place] [address] #[id number]

## 2018-05-07 ENCOUNTER — APPOINTMENT (OUTPATIENT)
Dept: LAB | Facility: CLINIC | Age: 78
End: 2018-05-07
Payer: MEDICARE

## 2018-05-07 ENCOUNTER — OFFICE VISIT (OUTPATIENT)
Dept: PHYSICAL THERAPY | Facility: CLINIC | Age: 78
End: 2018-05-07
Payer: MEDICARE

## 2018-05-07 ENCOUNTER — TRANSCRIBE ORDERS (OUTPATIENT)
Dept: LAB | Facility: CLINIC | Age: 78
End: 2018-05-07

## 2018-05-07 DIAGNOSIS — I48.0 PAROXYSMAL ATRIAL FIBRILLATION (HCC): Primary | ICD-10-CM

## 2018-05-07 DIAGNOSIS — M17.0 PRIMARY OSTEOARTHRITIS OF BOTH KNEES: Primary | ICD-10-CM

## 2018-05-07 LAB
INR PPP: 1.82 (ref 0.86–1.16)
PROTHROMBIN TIME: 21.2 SECONDS (ref 12.1–14.4)

## 2018-05-07 PROCEDURE — 36415 COLL VENOUS BLD VENIPUNCTURE: CPT | Performed by: INTERNAL MEDICINE

## 2018-05-07 PROCEDURE — 85610 PROTHROMBIN TIME: CPT | Performed by: INTERNAL MEDICINE

## 2018-05-07 PROCEDURE — 97110 THERAPEUTIC EXERCISES: CPT

## 2018-05-07 PROCEDURE — G8980 MOBILITY D/C STATUS: HCPCS | Performed by: PHYSICAL THERAPIST

## 2018-05-07 PROCEDURE — G8979 MOBILITY GOAL STATUS: HCPCS | Performed by: PHYSICAL THERAPIST

## 2018-05-07 NOTE — PROGRESS NOTES
Daily Note     Today's date: 2018  Patient name: Xu Springer  : 1940  MRN: [id number]  Referring provider: Pepe Coats DO  Dx: No diagnosis found  Subjective: My knees are feeling much better  I am going to make today my last visit  Objective: See treatment diary below    Precautions HTN, DM    Specialty Daily Treatment Diary     Manual                                                     Exercise Diary       Sitting knee Ext 1x10  2# x 20     Standing Hip ABD, EXT 2x10  2# x 20 each     Nustep   L 1 10 min 10 min L1     SLR Flex, ABD  2x 10 2x10     Clam shells  20x 20x     Bridges  15x 2x10     Sit to stand (chair)   20x                                                                                                               Assessment: Tolerated treatment well  HEP reviewed with daily performance encouraged        Plan: D/C with HEP

## 2018-05-07 NOTE — PROGRESS NOTES
PT Discharge    Today's date: 2018  Patient name: Elisa Hebert  : 1940  MRN: [id number]  Referring provider: Jean Pabon DO  Dx:   Encounter Diagnosis     ICD-10-CM    1  Primary osteoarthritis of both knees M17 0        Start Time: 1100  Stop Time: 1140  Total time in clinic (min): 40 minutes    Assessment    Assessment details: Elisa Hebert has been seen for Primary osteoarthritis of both knees  (primary encounter diagnosis),and has met the goals of physical therapy  And is independent with a HEP  Plan  Plan details: Discontinue PT        Subjective Evaluation    History of Present Illness  Mechanism of injury: My knees feel much better    Pain  Current pain ratin  At best pain ratin  At worst pain ratin          Objective     Functional Assessment     Comments  TUG 9 3 sec  Gait Speed 1 1 m/s  30 Sec  Chair Rise Test  12 reps      Flowsheet Rows      Most Recent Value   PT/OT G-Codes   Current Score  57   Projected Score  36   FOTO information reviewed  Yes   Assessment Type  Discharge   G code set  Mobility: Walking & Moving Around   Mobility: Walking and Moving Around Goal Status ()  CK   Mobility: Walking and Moving Around Discharge Status ()  CK

## 2018-05-08 ENCOUNTER — ANTICOAG VISIT (OUTPATIENT)
Dept: CARDIOLOGY CLINIC | Facility: CLINIC | Age: 78
End: 2018-05-08

## 2018-05-08 DIAGNOSIS — I48.91 ATRIAL FIBRILLATION, UNSPECIFIED TYPE (HCC): ICD-10-CM

## 2018-05-11 DIAGNOSIS — I48.91 ATRIAL FIBRILLATION, UNSPECIFIED TYPE (HCC): Primary | ICD-10-CM

## 2018-05-14 ENCOUNTER — APPOINTMENT (OUTPATIENT)
Dept: PHYSICAL THERAPY | Facility: CLINIC | Age: 78
End: 2018-05-14
Payer: MEDICARE

## 2018-05-21 ENCOUNTER — APPOINTMENT (OUTPATIENT)
Dept: PHYSICAL THERAPY | Facility: CLINIC | Age: 78
End: 2018-05-21
Payer: MEDICARE

## 2018-05-22 ENCOUNTER — TRANSCRIBE ORDERS (OUTPATIENT)
Dept: ADMINISTRATIVE | Facility: HOSPITAL | Age: 78
End: 2018-05-22

## 2018-05-22 ENCOUNTER — ANTICOAG VISIT (OUTPATIENT)
Dept: CARDIOLOGY CLINIC | Facility: CLINIC | Age: 78
End: 2018-05-22

## 2018-05-22 ENCOUNTER — APPOINTMENT (OUTPATIENT)
Dept: LAB | Facility: HOSPITAL | Age: 78
End: 2018-05-22
Payer: MEDICARE

## 2018-05-22 DIAGNOSIS — I48.91 ATRIAL FIBRILLATION, UNSPECIFIED TYPE (HCC): ICD-10-CM

## 2018-05-22 LAB
INR PPP: 1.67 (ref 0.86–1.16)
PROTHROMBIN TIME: 17.6 SECONDS (ref 9.4–11.7)

## 2018-05-22 PROCEDURE — 36415 COLL VENOUS BLD VENIPUNCTURE: CPT

## 2018-05-22 PROCEDURE — 85610 PROTHROMBIN TIME: CPT

## 2018-05-29 ENCOUNTER — APPOINTMENT (OUTPATIENT)
Dept: PHYSICAL THERAPY | Facility: CLINIC | Age: 78
End: 2018-05-29
Payer: MEDICARE

## 2018-06-12 ENCOUNTER — ANTICOAG VISIT (OUTPATIENT)
Dept: CARDIOLOGY CLINIC | Facility: CLINIC | Age: 78
End: 2018-06-12

## 2018-06-12 ENCOUNTER — APPOINTMENT (OUTPATIENT)
Dept: LAB | Facility: HOSPITAL | Age: 78
End: 2018-06-12
Payer: MEDICARE

## 2018-06-12 DIAGNOSIS — I48.91 ATRIAL FIBRILLATION, UNSPECIFIED TYPE (HCC): ICD-10-CM

## 2018-06-13 DIAGNOSIS — G25.81 RLS (RESTLESS LEGS SYNDROME): ICD-10-CM

## 2018-06-14 ENCOUNTER — TELEPHONE (OUTPATIENT)
Dept: CARDIOLOGY CLINIC | Facility: CLINIC | Age: 78
End: 2018-06-14

## 2018-06-14 NOTE — TELEPHONE ENCOUNTER
Pt c/o b/l ankle/foot edema, Pt has taken 2 doses of PRN lasix 20mg (first time she has taken since order was placed in Jan )  Pt stated edema has not improved, positive for increased voiding  Unknown weights, chronic SOB unchanged  Pt asking if she should increase frequency of lasix?  Please advise

## 2018-06-19 ENCOUNTER — OFFICE VISIT (OUTPATIENT)
Dept: PODIATRY | Facility: CLINIC | Age: 78
End: 2018-06-19
Payer: MEDICARE

## 2018-06-19 ENCOUNTER — OFFICE VISIT (OUTPATIENT)
Dept: INTERNAL MEDICINE CLINIC | Facility: CLINIC | Age: 78
End: 2018-06-19
Payer: MEDICARE

## 2018-06-19 VITALS
SYSTOLIC BLOOD PRESSURE: 118 MMHG | WEIGHT: 231 LBS | HEIGHT: 64 IN | HEART RATE: 75 BPM | TEMPERATURE: 99.5 F | RESPIRATION RATE: 20 BRPM | BODY MASS INDEX: 39.44 KG/M2 | DIASTOLIC BLOOD PRESSURE: 72 MMHG | OXYGEN SATURATION: 97 %

## 2018-06-19 VITALS
HEART RATE: 89 BPM | HEIGHT: 64 IN | BODY MASS INDEX: 40.63 KG/M2 | WEIGHT: 238 LBS | SYSTOLIC BLOOD PRESSURE: 108 MMHG | RESPIRATION RATE: 16 BRPM | DIASTOLIC BLOOD PRESSURE: 74 MMHG

## 2018-06-19 DIAGNOSIS — J06.9 UPPER RESPIRATORY TRACT INFECTION, UNSPECIFIED TYPE: ICD-10-CM

## 2018-06-19 DIAGNOSIS — L84 CORNS: ICD-10-CM

## 2018-06-19 DIAGNOSIS — M79.671 PAIN IN BOTH FEET: Primary | ICD-10-CM

## 2018-06-19 DIAGNOSIS — M79.672 PAIN IN BOTH FEET: Primary | ICD-10-CM

## 2018-06-19 DIAGNOSIS — J02.9 SORE THROAT: Primary | ICD-10-CM

## 2018-06-19 DIAGNOSIS — E11.42 DIABETIC POLYNEUROPATHY ASSOCIATED WITH TYPE 2 DIABETES MELLITUS (HCC): ICD-10-CM

## 2018-06-19 DIAGNOSIS — I70.209 PERIPHERAL ARTERIOSCLEROSIS (HCC): ICD-10-CM

## 2018-06-19 LAB — S PYO AG THROAT QL: NEGATIVE

## 2018-06-19 PROCEDURE — 94640 AIRWAY INHALATION TREATMENT: CPT | Performed by: INTERNAL MEDICINE

## 2018-06-19 PROCEDURE — 99213 OFFICE O/P EST LOW 20 MIN: CPT | Performed by: INTERNAL MEDICINE

## 2018-06-19 PROCEDURE — 87880 STREP A ASSAY W/OPTIC: CPT | Performed by: INTERNAL MEDICINE

## 2018-06-19 PROCEDURE — 11056 PARNG/CUTG B9 HYPRKR LES 2-4: CPT | Performed by: PODIATRIST

## 2018-06-19 RX ORDER — ALBUTEROL SULFATE 90 UG/1
2 AEROSOL, METERED RESPIRATORY (INHALATION) EVERY 6 HOURS PRN
Qty: 1 INHALER | Refills: 0 | Status: SHIPPED | OUTPATIENT
Start: 2018-06-19 | End: 2018-11-27

## 2018-06-19 RX ORDER — ALBUTEROL SULFATE 90 UG/1
2 AEROSOL, METERED RESPIRATORY (INHALATION) EVERY 6 HOURS PRN
Qty: 1 INHALER | Refills: 0 | Status: SHIPPED | OUTPATIENT
Start: 2018-06-19 | End: 2018-06-19 | Stop reason: SDUPTHER

## 2018-06-19 RX ORDER — ALBUTEROL SULFATE 2.5 MG/3ML
2.5 SOLUTION RESPIRATORY (INHALATION) ONCE
Status: COMPLETED | OUTPATIENT
Start: 2018-06-19 | End: 2018-06-19

## 2018-06-19 RX ADMIN — ALBUTEROL SULFATE 2.5 MG: 2.5 SOLUTION RESPIRATORY (INHALATION) at 15:34

## 2018-06-19 NOTE — PATIENT INSTRUCTIONS
Use saline nasal spray, 1 spray each nostril 1 to 2 times a day  Start Mucinex or Robitussin (or generic), take twice a day for next 2 days then as needed  Monitor for fevers

## 2018-06-19 NOTE — PROGRESS NOTES
Procedures   Foot Exam       Signed  Encounter Date: 4/17/2018   Assessment/Plan:  Painful calluses  Diabetic neuropathy  Peripheral vascular disease      Plan  Diabetic foot exam performed  All mycotic nails debrided  Plantar calluses debrided without pain or complication     Diagnoses and all orders for this visit:     Diabetic polyneuropathy associated with type 2 diabetes mellitus (HCC)     Corns     Pain in both feet     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[facility] [address] )       Discussion/Summary  The patient was counseled regarding instructions for management,-- prognosis,-- patient and family education,-- risks and benefits of treatment options,-- importance of compliance with treatment  Patient is able to Self-Care  Possible side effects of new medications were reviewed with the patient/guardian today  The treatment plan was reviewed with the patient/guardian  The patient/guardian understands and agrees with the treatment plan      Chief Complaint  Routine nail care      History of Present Illness  HPI: Patient is doing well with Cymbalta however she began have facial tingling  She discontinued medicine   However the medication was relieving her neuropathic pain       Review of Systems     ROS reviewed       Active Problems     1  Achilles tendinitis of left lower extremity (726 71) (M76 62)   2  Acquired ankle/foot deformity (736 70) (M21 969)   3  AF (paroxysmal atrial fibrillation) (427 31) (I48 0)   4  Anticoagulant long-term use (V58 61) (Z79 01)   5  Arthritis (716 90) (M19 90)   6  At risk for bone density loss (V49 89) (Z91 89)   7  Atrial fibrillation (427 31) (I48 91)   8  History of Breast Cancer (V10 3)   9  Difficulty walking (719 7) (R26 2)   10  Encounter for screening mammogram for breast cancer (V76 12) (Z12 31)   11  Esophageal reflux (530 81) (K21 9)   12  Foot pain, bilateral (729 5) (M79 671,M79 672)   13  Hiatal hernia (553 3) (K44 9)   14  History of Carrero's esophagus (V12 79) (Z87 19)   15  History of fall (V15 88) (Z91 81)   16  Hypertension (401 9) (I10)   17  Leg pain, left (729 5) (A45 635)   18  Lichen sclerosus et atrophicus (701 0) (L90 0)   19  Lumbar radiculopathy (724 4) (M54 16)   20  Multiple lung nodules (793 19) (R91 8)   21  Obesity (278 00) (E66 9)   22  Onychomycosis (110 1) (B35 1)   23  Osteopenia (733 90) (M85 80)   24  Pacemaker (V45 01) (Z95 0)   25  Peripheral neuropathy (356 9) (G62 9)   26  Pes planus of both feet (734) (M21 41,M21 42)   27  Plantar fasciitis of left foot (728 71) (M72 2)   28  Restless legs syndrome (333 94) (G25 81)     Past Medical History   · History of Abnormal glucose (790 29) (R73 09)   · Atrial fibrillation (427 31) (I48 91)   · History of Breast Cancer (V10 3)   · 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)   · Esophageal reflux (530 81) (K21 9)   · Denied: History of Exposure To STD   · History of Gross hematuria (599 71) (R31 0)   · History of Hematoma (924 9) (T14 8XXA)   · History of Hematoma of lower extremity, right, initial encounter (924 5) (S80 11XA)   · History of backache (V13 59) (Z87 39)   · History of Carrero's esophagus (V12 79) (Z87 19)   · History of cataract (V12 49) (Z86 69)   · History of chest pain (V13 89) (Z87 898)   · History of fall (V15 88) (Z91 81)   · History of hematuria (V13 09) (Z87 448)   · History of postmenopausal hormone replacement therapy (V87 49) (Z92 29)   · History of shortness of breath (V13 89) (C10 629)   · History of urinary incontinence (V13 09) (Z87 898)   · History of Neck pain (723 1) (M54 2)   · Normal delivery (650) (O80,Z37  9)   · History of Right knee pain (719 46) (M25 561)   · History of Ringing In The Ears (Tinnitus) (388 30)   · History of Skin rash (782 1) (R21)   · History of Traumatic blister of right lower extremity, initial encounter (916 2) (O04 396Y)   · History of UTI (lower urinary tract infection) (599 0) (N39 0)     The active problems and past medical history were reviewed and updated today       Surgical History   · Denied: History of Abnormal Pap Smear Of Cervix   · History of Breast Surgery Lumpectomy   · History of Cataract Surgery   · History of Diagnostic Cystoscopy   · History of Excision Lesion Of Meniscus Or Capsule Knee   · History of Pacemaker - Pulse Generator Replacement   · History of Pacemaker Placement   · History of Pacemaker Placement   · History of Radiation Therapy   · History of Total Abdominal Hysterectomy With Removal Of Both Ovaries     The surgical history was reviewed and updated today        Family History  Mother    · Denied: Family history of Alcoholism and drug addiction in family   · Denied: Family history of Anxiety and depression  Father    · Denied: Family history of Alcoholism and drug addiction in family   · Denied: Family history of Anxiety and depression   · Family history of Coronary Artery Disease (V17 49)   · Family history of abdominal aortic aneurysm (V17 49) (Z82 49)  Child    · Denied: Family history of Alcoholism and drug addiction in family   · Denied: Family history of Anxiety and depression  Sibling    · Denied: Family history of Alcoholism and drug addiction in family   · Denied: Family history of Anxiety and depression  Sister    · Family history of Breast Cancer (V16 3)  Maternal Aunt    · Family history of Colon Cancer (V16 0)   · Family history of Colon Cancer (V16 0)  Family History    · Denied: Family history of Diabetes Mellitus   · Denied: Family history of Stroke Syndrome     The family history was reviewed and updated today        Social History      · Being A Social Drinker   · Denied: History of Drug Use   · Former smoker (V15 82) (M82 200)   · 25 pack years, quit age 41   · Marital History - Currently    · Retired From Work   · 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  The social history was reviewed and updated today       Current Meds   1  Clobetasol Propionate 0 05 % External Ointment;  Apply to affected area twice weekly; Therapy: 32SPS3338 to (Last Rx:25Oct2017)  Requested for: 25Oct2017 Ordered   2  Gabapentin 800 MG Oral Tablet; TAKE 1 TABLET 4 TIMES DAILY; Therapy: 23PAF5311 to Recorded   3  Lisinopril 40 MG Oral Tablet; TAKE ONE TABLET BY MOUTH EVERY DAY AS DIRECTED; Therapy: 36DXQ4100 to (Evaluate:22Mar2018)  Requested for: 27Mar2017; Last Rx:27Mar2017 Ordered   4  Metoprolol Tartrate 50 MG Oral Tablet; TAKE 1 TABLET TWICE DAILY  Requested for: 30VCN1402; Last Rx:27Mar2017 Ordered   5  Multivitamins CAPS; TAKE 1 CAPSULE DAILY; Therapy: (Recorded:11Mar2014) to Recorded   6  Pramipexole Dihydrochloride 0 5 MG Oral Tablet; TAKE 1 5 TABLETS Bedtime; Therapy: 35ATI6881 to (Last Rx:99Kik7198) Ordered   7  Probiotic Oral Packet; Therapy: 25Oct2017 to Recorded   8  RaNITidine HCl - 150 MG Oral Tablet; TAKE 1 TABLET AT BEDTIME; Therapy: 95JID1410 to 0389 8787913)  Requested for: 93FXS7642; Last Rx:30Oct2017 Ordered   9  Savella 50 MG Oral Tablet; TAKE ONE TAB TWICE DAILY;  Therapy: 30GUM4839 to (Last Rx:20Nov2017)  Requested for: 20Nov2017 Ordered   10  Savella 50 MG Oral Tablet;  Therapy: 48FKY7601 to Recorded   11  Savella Titration Pack 12 5 & 25 & 50 MG Oral Miscellaneous; as directed; [address] [place] [address] Avenue: 23AWW5748 to (Evaluate:10Oct2017)  Requested for: 17Wek3222; Last  Rx:47Rwn6367 Ordered   12  Warfarin Sodium 5 MG Oral Tablet; take 1 to 1 1/2 tabs by mouth daily or sa directed; [address]: 54VXP0921 to (Evaluate:23Mar2018)  Requested for: 55YEH3908; Last  Rx:28Mar2017 Ordered     The medication list was reviewed and updated today        Allergies  1  duloxetine   2  LevoFLOXacin TABS   3  Cephalexin CAPS   4  Erythromycin Base TABS   5  Keflex TABS   6  Penicillins   7  Sulfa Drugs     Vitals        Heart Rate 78   Respiration 16   Systolic 502   Diastolic 81   Height 5 ft 4 in   Weight 233 lb    BMI Calculated 39 99   BSA Calculated 2 09      Physical Exam  Left Foot: Appearance: Normal except as noted: excessive pronation-- and-- pes planus  Tenderness: None except the lisfranc joint-- and-- medial longitudinal arch  ROM: subtalar motion was restricted  Right Foot: Appearance: Normal except as noted: excessive pronation-- and-- pes planus  Tenderness: None except the lisfranc joint-- and-- medial longitudinal arch  ROM: subtalar motion was restricted   Left Ankle: ROM: limited ROM in all planes   Right Ankle: ROM: limited ROM in all planes   Neurological Exam: performed  Light touch was decreased bilaterally  Vibratory sensation was decreased in both first metatarsophalangeal joints  Deep tendon reflexes: achilles reflex absent bilateraly-- and-- 4/5 L5 testing bilateral    Vascular Exam: performed Dorsalis pedis pulses were diminished bilaterally  Posterior tibial pulses were diminished bilaterally  Dependence rubor was present bilaterally  Capillary refill time was Negative digital hair noted, but-- between 1-3 seconds bilaterally  Toenails: All of the toenails were elongated,-- hypertrophied,-- discolored-- and--   Hyperkeratosis: present on both first toes  Shoe Gear Evaluation: performed ()  Recommendation(s): SAS style       Procedure  Nails debrided  Bilateral preulcerative lesions debrided as well  Procedure without pain or complication   Refill of Savella ordered         The following portions of the patient's history were reviewed and updated as appropriate: allergies, current medications, past family history, past medical history, past social history, past surgical history and problem list      Review of Systems       Objective:      Foot Exam     Right Foot/Ankle      Inspection and Palpation  Skin Exam: dry skin and skin intact;      Neurovascular  Dorsalis pedis: 1+  Posterior tibial: 1+        Left Foot/Ankle       Inspection and Palpation  Skin Exam: dry skin and skin intact;      Neurovascular  Dorsalis pedis: 1+  Posterior tibial: 1+        Physical Exam   Cardiovascular: Pulses are weak pulses  Pulses:       Dorsalis pedis pulses are 1+ on the right side, and 1+ on the left side         Posterior tibial pulses are 1+ on the right side, and 1+ on the left side  Feet:   Right Foot:   Skin Integrity: Positive for dry skin  Left Foot:   Skin Integrity: Positive for dry skin  Patient's shoes and socks removed  Right Foot/Ankle   Right Foot Inspection  Skin Exam: skin intact and dry skin               Toe Exam: swelling  Sensory   Vibration: absent  Proprioception: absent   Monofilament testing: absent  Vascular     The right DP pulse is 1+  The right PT pulse is 1+       Left Foot/Ankle  Left Foot Inspection  Skin Exam: skin intact and dry skin              Toe Exam: swelling                   Sensory   Vibration: absent  Proprioception: absent  Monofilament: absent  Vascular     The left DP pulse is 1+  The left PT pulse is 1+  Assign Risk Category:  Deformity present; Loss of protective sensation; Weak pulses       Risk: 2           Subjective:  patient has pain in her feet with ambulation  No history of trauma      Patient ID: Allen Alba is a 78 y o  female      HPI     The following portions of the patient's history were reviewed and updated as appropriate: allergies, current medications, past family history, past medical history, past social history, past surgical history and problem list      Review of Systems       Objective:      Foot Exam     Right Foot/Ankle      Inspection and Palpation  Skin Exam: callus;      Neurovascular  Dorsalis pedis: 1+  Posterior tibial: 1+        Left Foot/Ankle       Inspection and Palpation  Skin Exam: callus;      Neurovascular  Dorsalis pedis: 1+  Posterior tibial: 1+        Physical Exam   Cardiovascular: Pulses are weak pulses  Pulses:       Dorsalis pedis pulses are 1+ on the right side, and 1+ on the left side  Posterior tibial pulses are 1+ on the right side, and 1+ on the left side     Feet:   Right Foot:   Skin Integrity: Positive for callus  Left Foot:   Skin Integrity: Positive for callus  Patient's shoes and socks removed  Right Foot/Ankle   Right Foot Inspection  Skin Exam: callus and callus                           Toe Exam: swelling and erythema  Sensory   Vibration: diminished  Proprioception: diminished   Monofilament testing: diminished  Vascular  Capillary refills: elevated  The right DP pulse is 1+  The right PT pulse is 1+       Left Foot/Ankle  Left Foot Inspection  Skin Exam: callus                          Toe Exam: swelling                   Sensory   Vibration: diminished  Proprioception: diminished  Monofilament: diminished  Vascular  Capillary refills: elevated  The left DP pulse is 1+  The left PT pulse is 1+  Assign Risk Category:  Deformity present;  Loss of protective sensation; Weak pulses       Risk: 2

## 2018-06-19 NOTE — PROGRESS NOTES
Assessment/Plan:    No problem-specific Assessment & Plan notes found for this encounter  Diagnoses and all orders for this visit:    Sore throat  Comments:  Negative strep  Start saline gargles as needed, maintain adequate fluid intake  Orders:  -     POCT rapid strepA    Upper respiratory tract infection, unspecified type  Comments:  Symptoms improved with nebulizer  Given albuterol inhaler to use as needed  Start mucolytic bid, monitor for fevers  Orders:  -     albuterol inhalation solution 2 5 mg; Take 3 mL (2 5 mg total) by nebulization once   -     Discontinue: albuterol (VENTOLIN HFA) 90 mcg/act inhaler; Inhale 2 puffs every 6 (six) hours as needed for wheezing  -     albuterol (VENTOLIN HFA) 90 mcg/act inhaler; Inhale 2 puffs every 6 (six) hours as needed for wheezing          Subjective:      Patient ID: Laurence Fox is a 78 y o  female  Mrs Ortiz complains of a sore throat since 4 days ago  Symptoms gradually worse, now with cough occasionally productive of sputum  She denies any dizziness, chest pain or wheezing  She hears rattling in her chest   She denies any fevers or chills, no nasal congestion or ear symptoms  She has not tried any over-the-counter medication  No GI symptoms  No sick contacts  The following portions of the patient's history were reviewed and updated as appropriate: allergies, current medications, past medical history, past social history and problem list     Review of Systems   Constitutional: Negative for chills and fatigue  HENT: Positive for congestion, rhinorrhea and sore throat  Negative for ear discharge, ear pain, sinus pain and sinus pressure  Respiratory: Positive for cough  Negative for shortness of breath and wheezing  Cardiovascular: Negative for chest pain  Gastrointestinal: Negative for diarrhea, nausea and vomiting  Neurological: Negative for dizziness and headaches           Objective:      /72   Pulse 75   Temp 99 5 °F (37 5 °C)   Resp 20   Ht 5' 4" (1 626 m)   Wt 105 kg (231 lb)   SpO2 97%   BMI 39 65 kg/m²          Physical Exam   Constitutional: She appears well-developed and well-nourished  HENT:   Head: Normocephalic and atraumatic  Right Ear: Tympanic membrane, external ear and ear canal normal    Left Ear: Tympanic membrane, external ear and ear canal normal    Nose: Rhinorrhea present  Mouth/Throat: Mucous membranes are normal    Eyes: Conjunctivae are normal  Pupils are equal, round, and reactive to light  Neck: Neck supple  Cardiovascular: Normal rate, regular rhythm and normal heart sounds  Pulmonary/Chest: Effort normal  She has no wheezes  She has rales in the right middle field and the left middle field  Abdominal: Normal appearance and bowel sounds are normal    Lymphadenopathy:     She has no cervical adenopathy  Neurological: She is alert  Skin: Skin is warm  No rash noted  Nursing note and vitals reviewed  Patient given a treatment of albuterol nebulizer  Patient subjectively reports improvement of symptoms  Lung sounds improved

## 2018-06-22 ENCOUNTER — TELEPHONE (OUTPATIENT)
Dept: INTERNAL MEDICINE CLINIC | Facility: CLINIC | Age: 78
End: 2018-06-22

## 2018-06-22 NOTE — TELEPHONE ENCOUNTER
No need to continue Mucinex if not a lot of phlegm    Try using saline nasal spray or Flonase   Call if not better by next week

## 2018-06-22 NOTE — TELEPHONE ENCOUNTER
patient called and still has the wheezing in her chest   She didn't know if you were going to send in a script

## 2018-06-22 NOTE — TELEPHONE ENCOUNTER
She used her inhaler 8 times  She only has a cough no other symptoms  She wants to know if she should continue mucinex?

## 2018-06-22 NOTE — TELEPHONE ENCOUNTER
How many times have you been using the albuterol inhaler? Any cough, shortness of breath, chest pain or fevers?

## 2018-06-25 ENCOUNTER — TELEPHONE (OUTPATIENT)
Dept: INTERNAL MEDICINE CLINIC | Facility: CLINIC | Age: 78
End: 2018-06-25

## 2018-06-25 DIAGNOSIS — J45.21 MILD INTERMITTENT ASTHMATIC BRONCHITIS WITH ACUTE EXACERBATION: Primary | ICD-10-CM

## 2018-06-25 PROBLEM — J45.909 ASTHMATIC BRONCHITIS: Status: ACTIVE | Noted: 2018-06-25

## 2018-06-25 RX ORDER — FLUTICASONE PROPIONATE 110 UG/1
2 AEROSOL, METERED RESPIRATORY (INHALATION) 2 TIMES DAILY
Qty: 1 INHALER | Refills: 0 | Status: SHIPPED | OUTPATIENT
Start: 2018-06-25 | End: 2018-11-27

## 2018-06-25 NOTE — TELEPHONE ENCOUNTER
Can try using steroid inhaler  Can give a sample or can send to pharmacy    Can use albuterol (Ventolin) inhaler 3 to 4 x a day as needed for wheezing

## 2018-06-25 NOTE — TELEPHONE ENCOUNTER
Sent to pharmacy  Ok to use steroid inhaler with albuterol  Please rinse mouth after using steroid inhaler, will use it 2 puffs twice a day

## 2018-06-25 NOTE — TELEPHONE ENCOUNTER
Patient notified  Patient states she would like to try the sample for the steroid inhaler  States can send to [facility]  Patient would like to know if it is safe to use steroid inhaler and Ventolin inhaler around the same time

## 2018-06-27 ENCOUNTER — TELEPHONE (OUTPATIENT)
Dept: INTERNAL MEDICINE CLINIC | Facility: CLINIC | Age: 78
End: 2018-06-27

## 2018-06-27 ENCOUNTER — OFFICE VISIT (OUTPATIENT)
Dept: NEUROLOGY | Facility: CLINIC | Age: 78
End: 2018-06-27
Payer: MEDICARE

## 2018-06-27 VITALS
DIASTOLIC BLOOD PRESSURE: 84 MMHG | BODY MASS INDEX: 39.78 KG/M2 | WEIGHT: 233 LBS | HEIGHT: 64 IN | SYSTOLIC BLOOD PRESSURE: 132 MMHG | HEART RATE: 66 BPM

## 2018-06-27 DIAGNOSIS — G60.8 SENSORY POLYNEUROPATHY: ICD-10-CM

## 2018-06-27 DIAGNOSIS — G25.81 RLS (RESTLESS LEGS SYNDROME): ICD-10-CM

## 2018-06-27 DIAGNOSIS — E11.42 DIABETIC POLYNEUROPATHY ASSOCIATED WITH TYPE 2 DIABETES MELLITUS (HCC): Primary | ICD-10-CM

## 2018-06-27 PROCEDURE — 99214 OFFICE O/P EST MOD 30 MIN: CPT | Performed by: PSYCHIATRY & NEUROLOGY

## 2018-06-27 RX ORDER — PRAMIPEXOLE DIHYDROCHLORIDE 0.5 MG/1
TABLET ORAL
Qty: 270 TABLET | Refills: 1 | Status: SHIPPED | OUTPATIENT
Start: 2018-06-27 | End: 2018-09-14 | Stop reason: SDUPTHER

## 2018-06-27 RX ORDER — PRAMIPEXOLE DIHYDROCHLORIDE 0.5 MG/1
TABLET ORAL
Qty: 270 TABLET | Refills: 1 | Status: SHIPPED | OUTPATIENT
Start: 2018-06-27 | End: 2018-10-17 | Stop reason: SDUPTHER

## 2018-06-27 RX ORDER — GABAPENTIN 800 MG/1
800 TABLET ORAL 4 TIMES DAILY
Qty: 360 TABLET | Refills: 0 | Status: SHIPPED | OUTPATIENT
Start: 2018-06-27 | End: 2018-10-17 | Stop reason: SDUPTHER

## 2018-06-27 NOTE — TELEPHONE ENCOUNTER
FLOVENT OR PULMICORT WILL COST PT OVER $200   DO YOU HAVE SAMPLES WE COULD GIVE HER? CALL TO ADVISE   CAN LEAVE A MESSAGE ON THIS PHONE

## 2018-06-27 NOTE — PROGRESS NOTES
Patient ID: Richie Devi is a 78 y o  female  Assessment/Plan:    Painful neuropathy    Restless leg syndrome    Plan continue Mirapex and gabapentin and both prescription are sent to her mail-order pharmacy  Return to office visit in 4 months    Patient has been advised to start exercise program minute live with walking  Diagnoses and all orders for this visit:    Diabetic polyneuropathy associated with type 2 diabetes mellitus (HCC)    RLS (restless legs syndrome)  -     pramipexole (MIRAPEX) 0 5 mg tablet; One and half tablet twice a day    Sensory polyneuropathy  -     gabapentin (NEURONTIN) 800 mg tablet; Take 1 tablet (800 mg total) by mouth 4 (four) times a day           Subjective:    57-year-old female followed in the office for a diabetic polyneuropathy and also for restless leg syndrome  Patient reported to me usually see has no symptoms with sharp shooting pain or burning pain except on a rare occasion there is a transient sharp pain perhaps once a month or less  She usually does not have any tingling pins and needle sensation  There were 2 occasion when patient had a numbness in legs and feet  One time she was on the toilet seat middle of the night and she perhaps fell asleep and when she tried to get up after sometimes her legs were numb below the knee and patient end of falling on her knees  Second time when patient was dance recital and she was sitting for almost 2 hours and at that point when she tried to get up she had experience numbness below the knee also  Patient also reported that for months she had a issue with her walking especially on uneven surface  Patient was not show or of her footing, even though she denies walking with white stepped gait  In the office patient did walk with a normal stride  Patient reported she has been stable on Mirapex 0 5 mg tablet which she takes 1/2 tablet at 5:00 p m  and 9:00 p m  and also gabapentin 800 mg 4 times a day        Pt denies double vision, blurred vision, transient monocular blindness, vertigo, tinnitus, hearing loss, slurred speech, difficulty expressing and understanding, dysphasia, and focal weakness, numbness, imbalance and incoordination  Pt denies bladder and bowel urgency, frequency and incontinence  Pt denies double vision, blurred vision, transient monocular blindness, vertigo, tinnitus, hearing loss, slurred speech, difficulty expressing and understanding, dysphasia, and focal weakness, numbness, imbalance and incoordination  Pt denies bladder and bowel urgency, frequency and incontinence         Past Medical History:   Diagnosis Date    Atrial fibrillation ([facility] )     Carrero's esophagus     last assessed: 1/23/2018    Breast cancer ([facility] )     stage 1 (left), given adjuvant radiation with Arimidex x 5 years    Cancer [facility])     Left Breast, Lumpectomy    Cataract     last assessed: 3/11/2014    Dysplasia of toenail     last assessed: 8/29/2017    Esophageal reflux     GERD (gastroesophageal reflux disease)     Gross hematuria     last assessed: 2/19/2015    Hematuria     Hiatal hernia     Hypertension     Irregular heart beat     AFIB    Mixed sensory-motor polyneuropathy     Neuropathy     Pacemaker     Paroxysmal atrial fibrillation (HCC)     Peripheral neuropathy     Rectal bleeding     Restless leg syndrome     Shortness of breath     last assessed: 1/11/2016       Family History   Problem Relation Age of Onset    Hypertension Mother     Heart disease Father     Aneurysm Father     Coronary artery disease Father         in his 76s with aneurysm    Aortic aneurysm Father         abdominal    Scleroderma Sister     Breast cancer Sister     Hypertension Sister     No Known Problems Son     Testicular cancer Son     Thyroid cancer Son     Colon cancer Maternal Aunt     Colon cancer Maternal Aunt    ,    Social History     Social History    Marital status: /Civil Union     Spouse name: N/A    Number of children: N/A    Years of education: N/A     Occupational History    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     Social History Main Topics    Smoking status: Former Smoker     Years: 25 00     Quit date: 9/20/1980    Smokeless tobacco: Never Used      Comment: Quit over 30 years ago; quit age 41    Alcohol use Yes      Comment: rarely; being a social drinker (as per allscripts)    Drug use: No    Sexual activity: Not on file     Other Topics Concern    Not on file     Social History Narrative    No narrative on file       Current Outpatient Prescriptions on File Prior to Visit   Medication Sig Dispense Refill    albuterol (VENTOLIN HFA) 90 mcg/act inhaler Inhale 2 puffs every 6 (six) hours as needed for wheezing 1 Inhaler 0    Calcium Acetate, Phos Binder, (CALCIUM ACETATE PO) Take by mouth daily        clobetasol (TEMOVATE) 0 05 % cream Apply topically 2 (two) times a week      lisinopril (ZESTRIL) 40 mg tablet TAKE 1 TABLET BY MOUTH  EVERY DAY AS DIRECTED 90 tablet 3    metoprolol tartrate (LOPRESSOR) 50 mg tablet Take 50 mg by mouth 2 (two) times a day      multivitamin (THERAGRAN) TABS Take 1 tablet by mouth daily      mupirocin (BACTROBAN) 2 % ointment Apply topically 3 (three) times a day prn 30 g 1    Probiotic Product (PROBIOTIC PO) Take by mouth      ranitidine (ZANTAC) 150 mg tablet Take 1 tablet by mouth as needed        warfarin (COUMADIN) 5 mg tablet Take 7 5 mg by mouth daily 10 mg on Sunday and Thursday       [DISCONTINUED] gabapentin (NEURONTIN) 800 mg tablet TAKE 1 TABLET BY MOUTH 4  TIMES DAILY 360 tablet 0    fluticasone (FLOVENT HFA) 110 MCG/ACT inhaler Inhale 2 puffs 2 (two) times a day Rinse mouth after use   1 Inhaler 0    pramipexole (MIRAPEX) 0 5 mg tablet TAKE 1 AND 1/2 TABLETS BY  MOUTH AT 5PM AND 10PM 270 tablet 1    [DISCONTINUED] PRAMIPEXOLE DIHYDROCHLORIDE PO Take 5 mg by mouth 2 (two) times a day 1 1/2 tabs x 2 daily No current facility-administered medications on file prior to visit  Objective:    Blood pressure 132/84, pulse 66, height 5' 4" (1 626 m), weight 106 kg (233 lb)  Physical Exam   Eyes: EOM are normal  Pupils are equal, round, and reactive to light  Neck: Normal carotid pulses present  Carotid bruit is not present  Neurological: She has normal reflexes  Gait normal    Psychiatric: Her speech is normal        Neurological Exam    Mental Status  The patient is and oriented to person, place, time, and situation  Her recent and remote memory are normal  She has no visuospatial neglect  Her speech is normal  Her language is fluent with no aphasia  She follows multi-step commands  She has a normal fund of knowledge  Cranial Nerves    CN II: The patient's visual acuity and visual fields are normal   CN III, IV, VI: The patient's pupils are equally round and reactive to light and ocular movements are normal   CN V: The patient has normal facial sensation  CN VII:  The patient has symmetric facial movement  CN VIII:  The patient's hearing is normal   CN IX, X: The patient has symmetric palate movement and normal gag reflex  CN XI: The patient's shoulder shrug strength is normal   CN XII: The patient's tongue is midline without atrophy or fasciculations  Motor  The patient has normal muscle bulk throughout  Her overall muscle tone is normal throughout  Her strength is 5/5 in all four extremities except as noted  Sensory  The patient's sensation is normal in all four extremities to light touch, pinprick and proprioception  She has normal cortical sensation    Reflexes  Deep tendon reflexes are 2+ and symmetric in all four extremities with downgoing toes bilaterally  Gait and Coordination  The patient has normal gait and station and normal casual, toe, heel, and tandem gait  She has normal right heel to shin and normal left heel to shin coordination   She has normal right finger to nose and normal left finger to nose coordination  ROS:    Review of Systems   Constitutional: Negative  HENT: Negative  Eyes: Negative  Respiratory: Negative  Cardiovascular: Negative  Gastrointestinal: Negative  Endocrine: Negative  Genitourinary: Negative  Musculoskeletal: Negative  Skin: Negative  Allergic/Immunologic: Negative  Neurological: Negative  Hematological: Negative  Psychiatric/Behavioral: Negative

## 2018-07-16 ENCOUNTER — OFFICE VISIT (OUTPATIENT)
Dept: OBGYN CLINIC | Facility: CLINIC | Age: 78
End: 2018-07-16
Payer: MEDICARE

## 2018-07-16 VITALS
HEIGHT: 64 IN | DIASTOLIC BLOOD PRESSURE: 79 MMHG | BODY MASS INDEX: 40.22 KG/M2 | SYSTOLIC BLOOD PRESSURE: 115 MMHG | WEIGHT: 235.6 LBS

## 2018-07-16 DIAGNOSIS — G89.29 CHRONIC PAIN OF LEFT KNEE: ICD-10-CM

## 2018-07-16 DIAGNOSIS — M17.0 PRIMARY OSTEOARTHRITIS OF BOTH KNEES: Primary | ICD-10-CM

## 2018-07-16 DIAGNOSIS — M25.562 CHRONIC PAIN OF LEFT KNEE: ICD-10-CM

## 2018-07-16 PROCEDURE — 20610 DRAIN/INJ JOINT/BURSA W/O US: CPT | Performed by: ORTHOPAEDIC SURGERY

## 2018-07-16 PROCEDURE — 99214 OFFICE O/P EST MOD 30 MIN: CPT | Performed by: ORTHOPAEDIC SURGERY

## 2018-07-16 RX ORDER — BUPIVACAINE HYDROCHLORIDE 5 MG/ML
6 INJECTION, SOLUTION EPIDURAL; INTRACAUDAL
Status: COMPLETED | OUTPATIENT
Start: 2018-07-16 | End: 2018-07-16

## 2018-07-16 RX ORDER — TRIAMCINOLONE ACETONIDE 40 MG/ML
40 INJECTION, SUSPENSION INTRA-ARTICULAR; INTRAMUSCULAR
Status: COMPLETED | OUTPATIENT
Start: 2018-07-16 | End: 2018-07-16

## 2018-07-16 RX ADMIN — BUPIVACAINE HYDROCHLORIDE 6 ML: 5 INJECTION, SOLUTION EPIDURAL; INTRACAUDAL at 11:41

## 2018-07-16 RX ADMIN — TRIAMCINOLONE ACETONIDE 40 MG: 40 INJECTION, SUSPENSION INTRA-ARTICULAR; INTRAMUSCULAR at 11:41

## 2018-07-16 NOTE — PROGRESS NOTES
Assessment/Plan:  1  Primary osteoarthritis of both knees     2  Chronic pain of left knee       Patient is here for follow-up regarding her activity related left knee pain due to her underlying osteoarthritis  She underwent a steroid injection approximately 3 months ago and states that she had significant relief from this and her pain only started to return  She states the pain relief is significant and she would like to pursue another steroid injection here today  The risks and benefits of undergoing this injection were discussed at length  Patient tolerated a left knee steroid injection well  Post-injection instructions were provided  I have also congratulated her on her weight loss efforts as she is down a few lbs from her last encounter with me  I would like to see her back in approximately 3 or 4 months time pending the efficacy of today steroid injection  Large joint arthrocentesis  Date/Time: 7/16/2018 11:41 AM  Consent given by: patient  Site marked: site marked  Timeout: Immediately prior to procedure a time out was called to verify the correct patient, procedure, equipment, support staff and site/side marked as required   Supporting Documentation  Indications: pain   Procedure Details  Location: knee - L knee  Preparation: Patient was prepped and draped in the usual sterile fashion  Needle size: 20 G  Ultrasound guidance: no  Approach: anterolateral  Medications administered: 6 mL bupivacaine (PF) 0 5 %; 40 mg triamcinolone acetonide 40 mg/mL    Patient tolerance: patient tolerated the procedure well with no immediate complications  Dressing:  Sterile dressing applied      Subjective:  Left knee pain    Patient ID: Sandra Gonzalez is a 78 y o  female  HPI  Patient is here for follow-up regarding her activity related left knee pain  She underwent a steroid injection on 4/11/2018 here in the office in states that this provided significant relief of her activity related left knee pain   She states that she had upwards of 3 months worth of relief and her pain is starting to return over the last week  She states the pain is localized throughout the entire left knee and can reach 6/10 on the pain scale  She denies any new injury or trauma  Pain can be exacerbated with stair use and excessive amounts of activity  She states that she has lost 3 lb since her last visit with me and I have applauded her on her weight loss efforts  She is here requesting a repeat steroid injection in her left knee today  Review of Systems   Constitutional: Positive for activity change  HENT: Negative  Eyes: Negative  Respiratory: Negative  Cardiovascular: Negative  Gastrointestinal: Negative  Endocrine: Negative  Musculoskeletal: Positive for arthralgias  Skin: Negative  Neurological: Negative  Psychiatric/Behavioral: Negative            Past Medical History:   Diagnosis Date    [facility]     Carrero's esophagus     last assessed: 1/23/2018    [facility] ([facility] )     stage 1 (left), given adjuvant radiation with Arimidex x 5 years    Cancer [facility])     Left Breast, Lumpectomy    Cataract     last assessed: 3/11/2014    Dysplasia of toenail     last assessed: 8/29/2017    Esophageal reflux     GERD (gastroesophageal reflux disease)     Gross hematuria     last assessed: 2/19/2015    Hematuria     Hiatal hernia     Hypertension     Irregular heart beat     AFIB    Mixed sensory-motor polyneuropathy     Neuropathy     Pacemaker     Paroxysmal atrial fibrillation (HCC)     Peripheral neuropathy     Rectal bleeding     Restless leg syndrome     Shortness of breath     last assessed: 1/11/2016       Past Surgical History:   Procedure Laterality Date    BREAST LUMPECTOMY Left     onset: 2006    BREAST SURGERY      CARDIAC PACEMAKER PLACEMENT      x 3 2006    CATARACT EXTRACTION      CYSTOSCOPY  04/04/2014    diagnostic    HYSTERECTOMY      ALISON BSO; due to fibroid uterus; age 38    KNEE CARTILAGE SURGERY      excision lesion of meniscus or capsule knee    KNEE SURGERY      OTHER SURGICAL HISTORY      radiation therapy    HI COLONOSCOPY FLX DX W/COLLJ SPEC WHEN PFRMD N/A 2/8/2017    Procedure: COLONOSCOPY;  Surgeon: Marcella Bansal MD;  Location: [facility]; Service: Gastroenterology    HI ESOPHAGOGASTRODUODENOSCOPY TRANSORAL DIAGNOSTIC N/A 9/20/2017    Procedure: ESOPHAGOGASTRODUODENOSCOPY (EGD); Surgeon: Isacc Yates MD;  Location: [facility];   Service: Gastroenterology       Family History   Problem Relation Age of Onset    Hypertension Mother     Heart disease Father     Aneurysm Father     Coronary artery disease Father         in his 76s with aneurysm    Aortic aneurysm Father         abdominal    Scleroderma Sister    Lesa Mclaughlin Breast cancer Sister     Hypertension Sister     No Known Problems Son     Testicular cancer Son     Thyroid cancer Son     Colon cancer Maternal Aunt     Colon cancer Maternal Aunt        Social History     Occupational History    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     Social History Main Topics    Smoking status: Former Smoker     Years: 25 00     Quit date: 9/20/1980    Smokeless tobacco: Never Used      Comment: Quit over 30 years ago; quit age 41    Alcohol use Yes      Comment: rarely; being a social drinker (as per allscripts)    Drug use: No    Sexual activity: Not on file         Current Outpatient Prescriptions:     Calcium Acetate, Phos Binder, (CALCIUM ACETATE PO), Take by mouth daily  , Disp: , Rfl:     clobetasol (TEMOVATE) 0 05 % cream, Apply topically 2 (two) times a week, Disp: , Rfl:     gabapentin (NEURONTIN) 800 mg tablet, Take 1 tablet (800 mg total) by mouth 4 (four) times a day, Disp: 360 tablet, Rfl: 0    lisinopril (ZESTRIL) 40 mg tablet, TAKE 1 TABLET BY MOUTH  EVERY DAY AS DIRECTED, Disp: 90 tablet, Rfl: 3    metoprolol tartrate (LOPRESSOR) 50 mg tablet, Take 50 mg by mouth 2 (two) times a day, Disp: , Rfl:     multivitamin (THERAGRAN) TABS, Take 1 tablet by mouth daily, Disp: , Rfl:     mupirocin (BACTROBAN) 2 % ointment, Apply topically 3 (three) times a day prn, Disp: 30 g, Rfl: 1    pramipexole (MIRAPEX) 0 5 mg tablet, TAKE 1 AND 1/2 TABLETS BY  MOUTH AT 5PM AND 10PM, Disp: 270 tablet, Rfl: 1    Probiotic Product (PROBIOTIC PO), Take by mouth, Disp: , Rfl:     ranitidine (ZANTAC) 150 mg tablet, Take 1 tablet by mouth as needed  , Disp: , Rfl:     albuterol (VENTOLIN HFA) 90 mcg/act inhaler, Inhale 2 puffs every 6 (six) hours as needed for wheezing, Disp: 1 Inhaler, Rfl: 0    fluticasone (FLOVENT HFA) 110 MCG/ACT inhaler, Inhale 2 puffs 2 (two) times a day Rinse mouth after use , Disp: 1 Inhaler, Rfl: 0    pramipexole (MIRAPEX) 0 5 mg tablet, One and half tablet twice a day, Disp: 270 tablet, Rfl: 1    warfarin (COUMADIN) 5 mg tablet, Take 7 5 mg by mouth daily 10 mg on Sunday and Thursday , Disp: , Rfl:     Allergies   Allergen Reactions    Cephalexin     Erythromycin     Penicillins     Savella [Milnacipran]     Sulfa Antibiotics     Duloxetine Hcl Other (See Comments)     Facial pins and needles sensation    Levofloxacin Other (See Comments)     Muscular aches       Objective:  Vitals:    07/16/18 1115   BP: 115/79       Body mass index is 40 44 kg/m²  Left Knee Exam     Tenderness   The patient is experiencing tenderness in the lateral joint line, patella and medial joint line      Range of Motion   Extension: 0   Left knee flexion: 115°     Tests   Ned:  Medial - negative Lateral - negative  Drawer:       Anterior - negative     Posterior - negative  Varus: negative  Valgus: negative  Patellar Apprehension: negative    Other   Erythema: absent  Scars: absent  Sensation: normal  Pulse: present  Swelling: none  Effusion: no effusion present    Comments:  Crepitance on AROM and PROM-parapatellar  - PFG  No increased warmth of knee  Knee is stable at 0, 30, 90 degrees          Observations   Left Knee   Negative for effusion  Physical Exam   Musculoskeletal:        Left knee: She exhibits no effusion  Luis MELENDEZ    Division of Adult Reconstruction  Department of [facility]

## 2018-07-19 ENCOUNTER — ANTICOAG VISIT (OUTPATIENT)
Dept: CARDIOLOGY CLINIC | Facility: CLINIC | Age: 78
End: 2018-07-19

## 2018-07-19 ENCOUNTER — TRANSCRIBE ORDERS (OUTPATIENT)
Dept: ADMINISTRATIVE | Facility: HOSPITAL | Age: 78
End: 2018-07-19

## 2018-07-19 ENCOUNTER — APPOINTMENT (OUTPATIENT)
Dept: LAB | Facility: HOSPITAL | Age: 78
End: 2018-07-19
Payer: MEDICARE

## 2018-07-19 DIAGNOSIS — I48.91 ATRIAL FIBRILLATION, UNSPECIFIED TYPE (HCC): ICD-10-CM

## 2018-08-15 DIAGNOSIS — G60.8 SENSORY POLYNEUROPATHY: ICD-10-CM

## 2018-08-21 ENCOUNTER — TRANSCRIBE ORDERS (OUTPATIENT)
Dept: ADMINISTRATIVE | Facility: HOSPITAL | Age: 78
End: 2018-08-21

## 2018-08-21 ENCOUNTER — APPOINTMENT (OUTPATIENT)
Dept: LAB | Facility: HOSPITAL | Age: 78
End: 2018-08-21
Payer: MEDICARE

## 2018-08-21 DIAGNOSIS — I48.91 ATRIAL FIBRILLATION, UNSPECIFIED TYPE (HCC): ICD-10-CM

## 2018-08-21 LAB
INR PPP: 2.5 (ref 0.86–1.16)
PROTHROMBIN TIME: 24.9 SECONDS (ref 9.4–11.7)

## 2018-08-21 PROCEDURE — 85610 PROTHROMBIN TIME: CPT

## 2018-08-21 PROCEDURE — 36415 COLL VENOUS BLD VENIPUNCTURE: CPT

## 2018-08-22 ENCOUNTER — ANTICOAG VISIT (OUTPATIENT)
Dept: CARDIOLOGY CLINIC | Facility: CLINIC | Age: 78
End: 2018-08-22

## 2018-08-22 DIAGNOSIS — I48.91 ATRIAL FIBRILLATION, UNSPECIFIED TYPE (HCC): ICD-10-CM

## 2018-08-29 ENCOUNTER — OFFICE VISIT (OUTPATIENT)
Dept: CARDIOLOGY CLINIC | Facility: CLINIC | Age: 78
End: 2018-08-29
Payer: MEDICARE

## 2018-08-29 VITALS
SYSTOLIC BLOOD PRESSURE: 110 MMHG | BODY MASS INDEX: 40.46 KG/M2 | DIASTOLIC BLOOD PRESSURE: 70 MMHG | HEART RATE: 83 BPM | WEIGHT: 237 LBS | HEIGHT: 64 IN

## 2018-08-29 DIAGNOSIS — I48.19 PERSISTENT ATRIAL FIBRILLATION (HCC): Primary | ICD-10-CM

## 2018-08-29 PROCEDURE — 99214 OFFICE O/P EST MOD 30 MIN: CPT | Performed by: INTERNAL MEDICINE

## 2018-08-29 NOTE — PROGRESS NOTES
Cardiology Follow Up    [facility]  1940  [id number]  [address]  [address] [facility]  [phone]    No diagnosis found  Interval History: Followup for atrial fibrillation and pacemaker  She has chest heaviness while in bed and her arms feel weak with this symptom  This occurs once a month  She typically falls asleep  No exertional chest pain, no dyspnea and no palpitations  Problem List     Hiatal hernia    Atrial fibrillation ([facility] ) [I48 91]    Overview Signed 3/10/2018  8:59 AM by Freda Wilder MD     Description: s/p 2 unsuccessful ablation, s/p 2 cardioversion last in 2010, s/p pacemaker  ; on anticoagulation followed by cardiology         Acquired deformity of foot    Corns    Diabetic polyneuropathy associated with type 2 diabetes mellitus ([facility] )    Overview Signed 3/12/2018 12:21 PM by Freda Wilder MD     ?duloxetine         Lab Results   Component Value Date    HGBA1C 5 8 06/14/2017       No results for input(s): POCGLU in the last 72 hours  Blood Sugar Average: Last 72 hrs:          Pain in both feet    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HCC)    Radiculopathy of lumbar region    Primary osteoarthritis of both knees    RLS (restless legs syndrome)    Arthritis    Esophageal reflux    Hypertension    Lichen sclerosus et atrophicus    Multiple lung nodules    Overview Addendum 3/12/2018 12:24 PM by Freda Wilder MD     Stable, no further CT           Class 3 obesity due to excess calories with serious comorbidity and body mass index (BMI) of 40 0 to 44 9 in adult [facility])    Osteoarthritis of left knee    Osteopenia    Plantar fasciitis of left foot    Abnormal fasting glucose    Vitamin D deficiency    Dense breast tissue on mammogram    Difficulty walking    Flat foot    Onychomycosis    Carrero's esophagus with dysplasia    Asthmatic bronchitis    Chronic pain of left knee        Past Medical History:   Diagnosis Date    Atrial fibrillation ([facility] )     Carrero's esophagus     last assessed: 1/23/2018    [facility] ([facility] )     stage 1 (left), given adjuvant radiation with Arimidex x 5 years    Cancer [facility])     Left Breast, Lumpectomy    Cataract     last assessed: 3/11/2014    Dysplasia of toenail     last assessed: 8/29/2017    Esophageal reflux     GERD (gastroesophageal reflux disease)     Gross hematuria     last assessed: 2/19/2015    Hematuria     Hiatal hernia     Hypertension     Irregular heart beat     AFIB    Mixed sensory-motor polyneuropathy     Neuropathy     Pacemaker     Paroxysmal atrial fibrillation (HCC)     Peripheral neuropathy     Rectal bleeding     Restless leg syndrome     Shortness of breath     last assessed: 1/11/2016     Social History     Social History    Marital status: /Civil Union     Spouse name: N/A    Number of children: N/A    Years of education: N/A     Occupational History    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      retired     Social History Main Topics    Smoking status: Former Smoker     Years: 25 00     Quit date: 9/20/1980    Smokeless tobacco: Never Used      Comment: Quit over 30 years ago; quit age 41    Alcohol use Yes      Comment: rarely; being a social drinker (as per allscripts)    Drug use: No    Sexual activity: Not on file     Other Topics Concern    Not on file     Social History Narrative    No narrative on file      Family History   Problem Relation Age of Onset    Hypertension Mother     Heart disease Father     Aneurysm Father     Coronary artery disease Father         in his 76s with aneurysm    Aortic aneurysm Father         abdominal    Scleroderma Sister     Breast cancer Sister     Hypertension Sister     No Known Problems Son     Testicular cancer Son     Thyroid cancer Son     Colon cancer Maternal Aunt     Colon cancer Maternal Aunt      Past Surgical History:   Procedure Laterality Date    BREAST LUMPECTOMY Left     onset: 2006    BREAST SURGERY      CARDIAC PACEMAKER PLACEMENT      x 3 2006    CATARACT EXTRACTION      CYSTOSCOPY  04/04/2014    diagnostic    HYSTERECTOMY      ALISON BSO; due to fibroid uterus; age 38   [facility] KNEE CARTILAGE SURGERY      excision lesion of meniscus or capsule knee    KNEE SURGERY      OTHER SURGICAL HISTORY      radiation therapy    [place] COLONOSCOPY FLX DX W/COLLJ SPEC WHEN PFRMD N/A 2/8/2017    Procedure: COLONOSCOPY;  Surgeon: Lalita Olivares MD;  Location: [facility]; Service: Gastroenterology    [place] ESOPHAGOGASTRODUODENOSCOPY TRANSORAL DIAGNOSTIC N/A 9/20/2017    Procedure: ESOPHAGOGASTRODUODENOSCOPY (EGD); Surgeon: Cristofer Temple MD;  Location: [facility];   Service: Gastroenterology       Current Outpatient Prescriptions:     albuterol (VENTOLIN HFA) 90 mcg/act inhaler, Inhale 2 puffs every 6 (six) hours as needed for wheezing, Disp: 1 Inhaler, Rfl: 0    Calcium Acetate, Phos Binder, (CALCIUM ACETATE PO), Take by mouth daily  , Disp: , Rfl:     clobetasol (TEMOVATE) 0 05 % cream, Apply topically 2 (two) times a week, Disp: , Rfl:     gabapentin (NEURONTIN) 800 mg tablet, Take 1 tablet (800 mg total) by mouth 4 (four) times a day, Disp: 360 tablet, Rfl: 0    lisinopril (ZESTRIL) 40 mg tablet, TAKE 1 TABLET BY MOUTH  EVERY DAY AS DIRECTED, Disp: 90 tablet, Rfl: 3    metoprolol tartrate (LOPRESSOR) 50 mg tablet, Take 50 mg by mouth 2 (two) times a day, Disp: , Rfl:     multivitamin (THERAGRAN) TABS, Take 1 tablet by mouth daily, Disp: , Rfl:     mupirocin (BACTROBAN) 2 % ointment, Apply topically 3 (three) times a day prn, Disp: 30 g, Rfl: 1    pramipexole (MIRAPEX) 0 5 mg tablet, TAKE 1 AND 1/2 TABLETS BY  MOUTH AT 5PM AND 10PM, Disp: 270 tablet, Rfl: 1    Probiotic Product (PROBIOTIC PO), Take by mouth, Disp: , Rfl:     warfarin (COUMADIN) 5 mg tablet, Take 7 5 mg by mouth daily 10 mg on Sunday and Thursday , Disp: , Rfl:     fluticasone (FLOVENT HFA) 110 MCG/ACT inhaler, Inhale 2 puffs 2 (two) times a day Rinse mouth after use  (Patient not taking: Reported on 8/29/2018 ), Disp: 1 Inhaler, Rfl: 0    pramipexole (MIRAPEX) 0 5 mg tablet, One and half tablet twice a day (Patient not taking: Reported on 8/29/2018 ), Disp: 270 tablet, Rfl: 1    ranitidine (ZANTAC) 150 mg tablet, Take 1 tablet by mouth as needed  , Disp: , Rfl:   Allergies   Allergen Reactions    Cephalexin     Erythromycin     Penicillins     Savella [Milnacipran]     Sulfa Antibiotics     Duloxetine Hcl Other (See Comments)     Facial pins and needles sensation    Levofloxacin Other (See Comments)     Muscular aches       Labs:     Chemistry        Component Value Date/Time     03/06/2018 1128     11/09/2015 1115    K 4 2 03/06/2018 1128    K 4 5 11/09/2015 1115     03/06/2018 1128     11/09/2015 1115    CO2 28 03/06/2018 1128    CO2 30 (H) 11/09/2015 1115    BUN 25 03/06/2018 1128    BUN 20 11/09/2015 1115    CREATININE 0 75 03/06/2018 1128    CREATININE 0 8 11/09/2015 1115        Component Value Date/Time    CALCIUM 9 2 03/06/2018 1128    CALCIUM 9 0 11/09/2015 1115    ALKPHOS 69 03/06/2018 1128    ALKPHOS 61 11/09/2015 1115    AST 29 03/06/2018 1128    AST 23 11/09/2015 1115    ALT 39 03/06/2018 1128    ALT 31 11/09/2015 1115    BILITOT 0 5 11/09/2015 1115            Lab Results   Component Value Date    CHOL 162 11/09/2015     Lab Results   Component Value Date    HDL 40 03/06/2018    HDL 41 02/25/2017    HDL 43 11/09/2015     Lab Results   Component Value Date    LDLCALC 88 03/06/2018    LDLCALC 84 02/25/2017    LDLCALC 101 11/09/2015     Lab Results   Component Value Date    TRIG 110 03/06/2018    TRIG 111 02/25/2017    TRIG 88 11/09/2015     No components found for: CHOLHDL    Imaging: No results found  Review of Systems   Constitution: Negative  HENT: Negative  Eyes: Negative      Cardiovascular: Positive for chest pain  Respiratory: Negative  Endocrine: Negative  Hematologic/Lymphatic: Negative  Skin: Negative  Musculoskeletal: Negative  Gastrointestinal: Negative  Genitourinary: Negative  Neurological: Negative  Psychiatric/Behavioral: Negative  Allergic/Immunologic: Negative  Vitals:    08/29/18 1521   BP: 110/70   Pulse: 83           Physical Exam   Constitutional: She is oriented to person, place, and time  No distress  HENT:   Mouth/Throat: No oropharyngeal exudate  Eyes: No scleral icterus  Neck: No JVD present  Cardiovascular: Normal rate  An irregular rhythm present  Pulmonary/Chest: Effort normal and breath sounds normal  No respiratory distress  She has no wheezes  She has no rales  Abdominal: Soft  Bowel sounds are normal  She exhibits no distension  There is no tenderness  There is no rebound  Musculoskeletal: She exhibits no edema  Neurological: She is alert and oriented to person, place, and time  Skin: Skin is warm and dry  She is not diaphoretic  Psychiatric: She has a normal mood and affect  Her behavior is normal        Discussion/Summary:    1  Chronic Atrial Fibrillation: Overall doing well  Continue Metoprolol  Stable on Coumadin  Her resting heart rate has been stable and she checks at home  Overall doing well       2  Hypertension: Her BP is well controlled  Continue current medical therapy        3  s/p PPM: Continue PPM checks  Last pacer check okay in April  The patient was counseled regarding diagnostic results, instructions for management, risk factor reductions, impressions  total time of encounter was 25 minutes and 15 minutes was spent counseling

## 2018-08-30 ENCOUNTER — TRANSCRIBE ORDERS (OUTPATIENT)
Dept: ADMINISTRATIVE | Facility: HOSPITAL | Age: 78
End: 2018-08-30

## 2018-08-30 ENCOUNTER — APPOINTMENT (OUTPATIENT)
Dept: LAB | Facility: HOSPITAL | Age: 78
End: 2018-08-30
Payer: MEDICARE

## 2018-08-30 DIAGNOSIS — E55.9 VITAMIN D DEFICIENCY: ICD-10-CM

## 2018-08-30 DIAGNOSIS — G62.89 DISEASE RELATED PERIPHERAL NEUROPATHY: ICD-10-CM

## 2018-08-30 DIAGNOSIS — E55.9 AVITAMINOSIS D: ICD-10-CM

## 2018-08-30 DIAGNOSIS — I48.91 ATRIAL FIBRILLATION, UNSPECIFIED TYPE (HCC): ICD-10-CM

## 2018-08-30 DIAGNOSIS — R73.01 ABNORMAL FASTING GLUCOSE: ICD-10-CM

## 2018-08-30 DIAGNOSIS — R73.01 IMPAIRED FASTING GLUCOSE: ICD-10-CM

## 2018-08-30 DIAGNOSIS — I48.0 PAROXYSMAL ATRIAL FIBRILLATION (HCC): Primary | ICD-10-CM

## 2018-08-30 DIAGNOSIS — I48.0 PAROXYSMAL ATRIAL FIBRILLATION (HCC): ICD-10-CM

## 2018-08-30 DIAGNOSIS — G62.89 OTHER POLYNEUROPATHY: ICD-10-CM

## 2018-08-30 LAB
25(OH)D3 SERPL-MCNC: 40.3 NG/ML (ref 30–100)
ANION GAP SERPL CALCULATED.3IONS-SCNC: 3 MMOL/L (ref 4–13)
BUN SERPL-MCNC: 30 MG/DL (ref 5–25)
CALCIUM SERPL-MCNC: 8.9 MG/DL (ref 8.3–10.1)
CHLORIDE SERPL-SCNC: 106 MMOL/L (ref 100–108)
CO2 SERPL-SCNC: 30 MMOL/L (ref 21–32)
CREAT SERPL-MCNC: 0.86 MG/DL (ref 0.6–1.3)
EST. AVERAGE GLUCOSE BLD GHB EST-MCNC: 128 MG/DL
GFR SERPL CREATININE-BSD FRML MDRD: 66 ML/MIN/1.73SQ M
GLUCOSE P FAST SERPL-MCNC: 89 MG/DL (ref 65–99)
HBA1C MFR BLD: 6.1 % (ref 4.2–6.3)
POTASSIUM SERPL-SCNC: 4.4 MMOL/L (ref 3.5–5.3)
SODIUM SERPL-SCNC: 139 MMOL/L (ref 136–145)
VIT B12 SERPL-MCNC: 650 PG/ML (ref 100–900)

## 2018-08-30 PROCEDURE — 83036 HEMOGLOBIN GLYCOSYLATED A1C: CPT | Performed by: INTERNAL MEDICINE

## 2018-08-30 PROCEDURE — 36415 COLL VENOUS BLD VENIPUNCTURE: CPT | Performed by: INTERNAL MEDICINE

## 2018-08-30 PROCEDURE — 82607 VITAMIN B-12: CPT

## 2018-08-30 PROCEDURE — 82306 VITAMIN D 25 HYDROXY: CPT

## 2018-08-30 PROCEDURE — 80048 BASIC METABOLIC PNL TOTAL CA: CPT

## 2018-09-14 ENCOUNTER — OFFICE VISIT (OUTPATIENT)
Dept: INTERNAL MEDICINE CLINIC | Facility: CLINIC | Age: 78
End: 2018-09-14
Payer: MEDICARE

## 2018-09-14 VITALS
HEIGHT: 64 IN | RESPIRATION RATE: 18 BRPM | OXYGEN SATURATION: 97 % | TEMPERATURE: 98.3 F | HEART RATE: 80 BPM | WEIGHT: 238.4 LBS | BODY MASS INDEX: 40.7 KG/M2 | SYSTOLIC BLOOD PRESSURE: 118 MMHG | DIASTOLIC BLOOD PRESSURE: 72 MMHG

## 2018-09-14 DIAGNOSIS — M79.602 PAIN IN BOTH UPPER EXTREMITIES: ICD-10-CM

## 2018-09-14 DIAGNOSIS — Z12.31 ENCOUNTER FOR SCREENING MAMMOGRAM FOR BREAST CANCER: ICD-10-CM

## 2018-09-14 DIAGNOSIS — R25.2 MUSCLE CRAMPING: ICD-10-CM

## 2018-09-14 DIAGNOSIS — IMO0001 CLASS 3 OBESITY DUE TO EXCESS CALORIES WITH SERIOUS COMORBIDITY AND BODY MASS INDEX (BMI) OF 40.0 TO 44.9 IN ADULT: ICD-10-CM

## 2018-09-14 DIAGNOSIS — Z23 NEED FOR INFLUENZA VACCINATION: ICD-10-CM

## 2018-09-14 DIAGNOSIS — G31.84 MILD COGNITIVE IMPAIRMENT: Primary | ICD-10-CM

## 2018-09-14 DIAGNOSIS — I48.91 ATRIAL FIBRILLATION, UNSPECIFIED TYPE (HCC): ICD-10-CM

## 2018-09-14 DIAGNOSIS — I10 ESSENTIAL HYPERTENSION: ICD-10-CM

## 2018-09-14 DIAGNOSIS — G25.81 RLS (RESTLESS LEGS SYNDROME): ICD-10-CM

## 2018-09-14 DIAGNOSIS — M79.601 PAIN IN BOTH UPPER EXTREMITIES: ICD-10-CM

## 2018-09-14 DIAGNOSIS — E55.9 VITAMIN D DEFICIENCY: ICD-10-CM

## 2018-09-14 DIAGNOSIS — E11.42 DIABETIC POLYNEUROPATHY ASSOCIATED WITH TYPE 2 DIABETES MELLITUS (HCC): ICD-10-CM

## 2018-09-14 DIAGNOSIS — M85.89 OSTEOPENIA OF MULTIPLE SITES: ICD-10-CM

## 2018-09-14 PROBLEM — M79.672 PAIN IN BOTH FEET: Status: RESOLVED | Noted: 2018-02-13 | Resolved: 2018-09-14

## 2018-09-14 PROBLEM — M79.671 PAIN IN BOTH FEET: Status: RESOLVED | Noted: 2018-02-13 | Resolved: 2018-09-14

## 2018-09-14 PROCEDURE — 99214 OFFICE O/P EST MOD 30 MIN: CPT | Performed by: INTERNAL MEDICINE

## 2018-09-14 PROCEDURE — 90662 IIV NO PRSV INCREASED AG IM: CPT | Performed by: INTERNAL MEDICINE

## 2018-09-14 PROCEDURE — G0008 ADMIN INFLUENZA VIRUS VAC: HCPCS | Performed by: INTERNAL MEDICINE

## 2018-09-14 RX ORDER — MAG HYDROX/ALUMINUM HYD/SIMETH 400-400-40
SUSPENSION, ORAL (FINAL DOSE FORM) ORAL DAILY
COMMUNITY
End: 2020-08-18 | Stop reason: HOSPADM

## 2018-09-14 RX ORDER — FUROSEMIDE 40 MG/1
TABLET ORAL
Qty: 90 TABLET | Refills: 0 | Status: SHIPPED | OUTPATIENT
Start: 2018-09-14 | End: 2019-02-21 | Stop reason: SDUPTHER

## 2018-09-14 RX ORDER — FUROSEMIDE 40 MG/1
40 TABLET ORAL DAILY PRN
Qty: 30 TABLET | Refills: 1 | Status: SHIPPED | OUTPATIENT
Start: 2018-09-14 | End: 2018-09-14 | Stop reason: SDUPTHER

## 2018-09-14 NOTE — PATIENT INSTRUCTIONS
Potassium Content of Foods List   WHAT YOU NEED TO KNOW:   Potassium is a mineral that is found in most foods  Potassium helps to balance fluids and minerals in your body  It also helps your body maintain a normal blood pressure  Potassium helps your muscles contract and your nerves function normally  You may need to increase or decrease potassium if you have certain health conditions  Why you may need to change the amount of potassium you eat:   · You may need more potassium  if you have hypokalemia (low potassium levels) or high blood pressure  You may also need more potassium if you are taking diuretics  Diuretics and certain medicines cause your body to lose potassium  · You may need less potassium  in your diet if you have hyperkalemia (high potassium levels) or kidney disease  Potassium content of fruit:  The amount of potassium in milligrams (mg) contained in each fruit or serving of fruit is listed beside the item    · High-potassium foods (more than 200 mg per serving):      ¨ 1 medium banana (425)    ¨ ½ of a papaya (390)    ¨ ½ cup of prune juice (370)    ¨ ¼ cup of raisins (270)    ¨ 1 medium shashi (325) or kiwi (240)    ¨ 1 small orange (240) or ½ cup of orange juice (235)    ¨ ½ cup of cubed cantaloupe (215) or diced honeydew melon (200)    ¨ 1 medium pear (200)    · Medium-potassium foods (50 to 200 mg per serving):      ¨ 1 medium peach (185)    ¨ 1 small apple or ½ cup of apple juice (150)    ¨ ½ cup of peaches canned in juice (120)    ¨ ½ cup of canned pineapple (100)    ¨ ½ cup of fresh, sliced strawberries (307)    ¨ ½ cup of watermelon (85)    · Low-potassium foods (less than 50 mg per serving):      ¨ ½ cup of cranberries (45) or cranberry juice cocktail (20)    ¨ ½ cup of nectar of papaya, shashi, or pear (35)  Potassium content of vegetables:   · High-potassium foods (more than 200 mg per serving):      ¨ 1 medium baked potato, with skin (925)    ¨ 1 baked medium sweet potato, with skin (450)    ¨ ½ cup of tomato or vegetable juice (275), or 1 medium raw tomato (290)    ¨ ½ cup of mushrooms (280)    ¨ ½ cup of fresh brussels sprouts (250)    ¨ ½ cup of cooked zucchini (220) or winter squash (250)    ¨ ¼ of a medium avocado (245)    ¨ ½ cup of broccoli (230)    · Medium-potassium foods (50 to 200 mg per serving):      ¨ ½ cup of corn (195)    ¨ ½ cup of fresh or cooked carrots (180)    ¨ ½ cup of fresh cauliflower (150)    ¨ ½ cup of asparagus (155)    ¨ ½ cup of canned peas (90)     ¨ 1 cup of lettuce, all types (100)    ¨ ½ cup of fresh green beans (90)    ¨ ½ cup of frozen green beans (85)    ¨ ½ cup of cucumber (80)  Potassium content of protein foods:   · High-potassium foods (more than 200 mg per serving):      ¨ ½ cup of cooked kee beans (400) or lentils (365)    ¨ 1 cup of soy milk (300)    ¨ 3 ounces of baked or broiled salmon (319)    ¨ 3 ounces of roasted turkey, dark meat (250)    ¨ ¼ cup of sunflower seeds (241)    ¨ 3 ounces of cooked lean beef (224)    ¨ 2 tablespoons of smooth peanut butter (210)    · Medium-potassium foods (50 to 200 mg per serving):      ¨ 1 ounce of salted peanuts, almonds, or cashews (200)    ¨ 1 large egg (60 mg)  Potassium content of dairy foods:   · High-potassium foods (more than 200 mg per serving):      ¨ 6 ounces of yogurt (260 to 435)    ¨ 1 cup of nonfat, low-fat, or whole milk (350 to 380)    · Medium-potassium foods (50 to 200 mg per serving):      ¨ ½ cup of ricotta cheese (154)    ¨ ½ cup of vanilla ice cream (131)    ¨ ½ cup of low-fat (2%) cottage cheese (110)    · Low-potassium foods (less than 50 mg per serving):      ¨ 1 ounce of cheese (20 to 30)    Potassium content of grains:   · 1 slice of white bread (30)    · ½ cup of white or brown rice (50)    · ½ cup of spaghetti or macaroni (30)    · 1 flour or corn tortilla (50)    · 1 four-inch waffle (50)  Potassium content of other foods:   · 1 tablespoon of molasses (295)    · 1½ ounces of chocolate (165)    · Some salt substitutes may contain a high amount of potassium  Check the food label to find the amount of potassium it contains  Hypomagnesemia   WHAT YOU NEED TO KNOW:   Hypomagnesemia is a condition that develops when the amount of magnesium in your body is too low  Magnesium is a mineral that helps your heart, muscles, and nerves work normally  It also helps strengthen your bones  Limit or do not drink alcohol  Alcohol can prevent your body from absorbing magnesium  Alcohol also makes your body release large amounts of magnesium through your urine  You may need to take a magnesium supplement  Ask your healthcare provider which supplement to take and how often to take it  Foods that contain magnesium:   · Almonds, cashews, peanuts, and peanut butter    · Dark green leafy vegetables, such as spinach    · Raisins, bananas, apples, broccoli, and carrots    · Soy milk and soy beans    · Black beans and kidney beans    · Whole-wheat bread and brown rice    Shredded-wheat cereal, oatmeal, and other breakfast cereals fortified with magnesium    · Plain low-fat yogurt and milk    · Cooked halibut      Upper Back Exercises   AMBULATORY CARE:   Upper back exercises  help heal and strengthen your back muscles and prevent another injury  Ask your healthcare provider if you need to see a physical therapist for more advanced exercises  · Do the exercises on a mat or firm surface  (not on a bed) to support your spine  · Move slowly and smoothly  Avoid fast or jerky motions  · Breathe normally  Do not hold your breath  · Stop if you feel pain  It is normal to feel some discomfort at first  Regular exercise will help decrease your discomfort over time  Seek care immediately if:   · You have severe pain that prevents you from moving  Contact your healthcare provider if:   · Your pain becomes worse  · You have new pain      · You have questions or concerns about your condition, care, or exercise program   Perform upper back exercises safely:  Ask your healthcare provider which of the following exercises are best for you and how often to do them  · Head rolls:  Sit in a chair or stand  Bring your chin toward your chest and roll your head to the right  Your ear should be over your shoulder  Hold this position for 5 seconds  Roll your head back toward your chest and to the left  Your ear should be over your left shoulder  Hold this position for 5 seconds  Next, roll your head back slowly in a clockwise [place] and repeat 3 times  Do 3 sets of head rolls  · Scapular squeeze:  Sit or stand with your arms at your sides  Squeeze your shoulder blades together and hold for 3 seconds  Relax and repeat 3 times  · Pectoralis stretch:   a doorway  Lift your hands and place them on each side of the door frame or wall slightly higher than your head  Lean forward slowly until you feel a gentle stretch  Hold for 15 seconds  Repeat 3 times, or as directed  · Cat and camel exercise:  Place your hands and knees on the floor  Arch your back upward toward the ceiling and lower your head  Round out your spine as much as you can  Hold for 5 seconds  Lift your head upward and push your chest downward toward the floor  Hold for 5 seconds  Do 3 sets or as directed  · Bird dog:  Place your hands and knees on the floor  Keep your wrists directly below your shoulders and your knees directly below your hips  Pull your belly button in toward your spine  Do not flatten or arch your back  Tighten your abdominal muscles  Raise one arm straight out so that it is aligned with your head  Next, raise the leg opposite your arm  Hold this position for 15 seconds  Lower your arm and leg slowly and change sides  Do 5 sets           © 2017 [employer] Information is for End User's use only and may not be sold, redistributed or otherwise used for commercial purposes  All illustrations and images included in CareNotes® are the copyrighted property of A D A M , Inc  or Patrick Coppola  The above information is an  only  It is not intended as medical advice for individual conditions or treatments  Talk to your doctor, nurse or pharmacist before following any medical regimen to see if it is safe and effective for you

## 2018-09-14 NOTE — ASSESSMENT & PLAN NOTE
Lab Results   Component Value Date    HGBA1C 6 1 08/30/2018     On gabapentin, stable    B12 levels within normal

## 2018-09-14 NOTE — PROGRESS NOTES
Assessment/Plan:    Mild cognitive impairment  MOCA today was 24/30  Reassured  Diabetic polyneuropathy associated with type 2 diabetes mellitus ([facility] )  Lab Results   Component Value Date    HGBA1C 6 1 08/30/2018     On gabapentin, stable  B12 levels within normal       RLS (restless legs syndrome)  Stable, on pramipexole  Followed by neurology  Atrial fibrillation ([facility] ) [I48 91]  On warfarin  Stable  Recently saw cardiology  Takes furosemide as needed for leg edema  Essential hypertension  BP stable on lisinopril and metoprolol  Class 3 obesity due to excess calories with serious comorbidity and body mass index (BMI) of 40 0 to 44 9 in [facility])  Encouraged to start walking daily again  Weight gain since last visit  Esophageal reflux  No symptoms, not on medication  Osteopenia of multiple sites  Bone density updated  Continue daily supplements  Diagnoses and all orders for this visit:    Mild cognitive impairment    Muscle cramping  Comments:  Recommend to try to eat foods high in K and Mg  Maintain adequate fluid intake  Pain in both upper extremities  Comments:  Intermittent  Given exercises for neck and shoulders, discussed proper posture  Atrial fibrillation, unspecified type (HCC)  -     furosemide (LASIX) 40 mg tablet; Take 1 tablet (40 mg total) by mouth daily as needed (edema)  -     CBC and differential; Future  -     Lipid panel; Future  -     TSH, 3rd generation with Free T4 reflex; Future    Class 3 obesity due to excess calories with serious comorbidity and body mass index (BMI) of 40 0 to 44 9 in [facility])    Encounter for screening mammogram for breast cancer  -     Mammo screening bilateral w cad; Future    RLS (restless legs syndrome)    Essential hypertension  -     CBC and differential; Future  -     Comprehensive metabolic panel; Future  -     Lipid panel; Future  -     TSH, 3rd generation with Free T4 reflex;  Future    Diabetic polyneuropathy associated with type 2 diabetes mellitus (HCC)  -     Hemoglobin A1C; Future  -     Lipid panel; Future    Osteopenia of multiple sites    Vitamin D deficiency    Need for influenza vaccination  -     influenza vaccine, [id number], high-dose, PF 0 5 mL, for patients 65 yr+ (FLUZONE HIGH-DOSE)    Other orders  -     Cholecalciferol (VITAMIN D3) 5000 units CAPS; Take by mouth      Follow up in 6 months or as needed  Subjective:      Patient ID: Xu Springer is a 78 y o  female  Mrs Ortiz has several concerns  First, she complains of frequent cramping of her hands, legs or feet  It occurs almost daily, usually one part or the other  She drinks a lot of water, does not take additional supplements  Second, she feels that her arms are "heavy" and that they ache sometimes  It occurs less than once a week, denies any neck, shoulder or chest pain  No recent falls  Third, she feels her head is full sometimes, thinks it is allergies  Has not started using her nasal sprays this season  Lastly, she reports that she is more forgetful lately  Her sister stayed with her for about a month, noticed that her memory is not the same  The following portions of the patient's history were reviewed and updated as appropriate: allergies, current medications, past medical history, past social history and problem list     Review of Systems   Constitutional: Negative for appetite change and fatigue  HENT: Negative for congestion, ear pain and postnasal drip  Eyes: Negative for visual disturbance  Respiratory: Negative for cough and shortness of breath  Cardiovascular: Negative for chest pain and leg swelling  Gastrointestinal: Negative for abdominal pain, constipation and diarrhea  Genitourinary: Negative for dysuria, frequency and urgency  Musculoskeletal: Negative for arthralgias and myalgias  Skin: Negative for rash and wound  Neurological: Negative for dizziness, numbness and headaches  Hematological: Does not bruise/bleed easily  Psychiatric/Behavioral: Negative for confusion  The patient is not nervous/anxious  Objective:      /72   Pulse 80   Temp 98 3 °F (36 8 °C)   Resp 18   Ht 5' 4" (1 626 m)   Wt 108 kg (238 lb 6 4 oz)   SpO2 97%   BMI 40 92 kg/m²          Physical Exam   Constitutional: She is oriented to person, place, and time  She appears well-developed and well-nourished  HENT:   Head: Normocephalic and atraumatic  Nose: Nose normal    Eyes: Conjunctivae are normal  Pupils are equal, round, and reactive to light  Neck: Neck supple  Cardiovascular: Normal rate, regular rhythm and normal heart sounds  No edema  Pulmonary/Chest: Effort normal and breath sounds normal  She has no wheezes  She has no rales  Abdominal: Soft  Bowel sounds are normal    Neurological: She is alert and oriented to person, place, and time  Skin: Skin is warm  No rash noted  Psychiatric: She has a normal mood and affect  Her behavior is normal    Nursing note and vitals reviewed  If you have any questions or concerns, please call the office

## 2018-09-26 ENCOUNTER — OFFICE VISIT (OUTPATIENT)
Dept: PODIATRY | Facility: CLINIC | Age: 78
End: 2018-09-26
Payer: MEDICARE

## 2018-09-26 VITALS
WEIGHT: 238.4 LBS | DIASTOLIC BLOOD PRESSURE: 86 MMHG | HEART RATE: 90 BPM | SYSTOLIC BLOOD PRESSURE: 128 MMHG | BODY MASS INDEX: 40.7 KG/M2 | RESPIRATION RATE: 17 BRPM | HEIGHT: 64 IN

## 2018-09-26 DIAGNOSIS — I70.209 PERIPHERAL ARTERIOSCLEROSIS (HCC): Primary | ICD-10-CM

## 2018-09-26 DIAGNOSIS — E11.42 DIABETIC POLYNEUROPATHY ASSOCIATED WITH TYPE 2 DIABETES MELLITUS (HCC): ICD-10-CM

## 2018-09-26 DIAGNOSIS — M79.671 PAIN IN BOTH FEET: ICD-10-CM

## 2018-09-26 DIAGNOSIS — M79.672 PAIN IN BOTH FEET: ICD-10-CM

## 2018-09-26 DIAGNOSIS — L84 CORNS: ICD-10-CM

## 2018-09-26 PROCEDURE — 11056 PARNG/CUTG B9 HYPRKR LES 2-4: CPT | Performed by: PODIATRIST

## 2018-09-26 NOTE — PROGRESS NOTES
Procedures   Foot Exam       Signed  Encounter Date: 4/17/2018   Assessment/Plan:  Painful calluses   Diabetic neuropathy   Peripheral vascular disease      Plan   Diabetic foot exam performed   All mycotic nails debrided   Plantar calluses debrided without pain or complication      Diagnoses and all orders for this visit:     Diabetic polyneuropathy associated with type 2 diabetes mellitus ([facility] [address] )     Corns     Pain in both feet     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[facility] [address] )       Discussion/Summary  The patient was counseled regarding instructions for management,-- prognosis,-- patient and family education,-- risks and benefits of treatment options,-- importance of compliance with treatment  Patient is able to Self-Care  Possible side effects of new medications were reviewed with the patient/guardian today  The treatment plan was reviewed with the patient/guardian  The patient/guardian understands and agrees with the treatment plan      Chief Complaint  Routine nail care      History of Present Illness  HPI: Patient is doing well with Cymbalta however she began have facial tingling  She discontinued medicine   However the medication was relieving her neuropathic pain       Review of Systems     ROS reviewed       Active Problems     1  Achilles tendinitis of left lower extremity (726 71) (M76 62)   2  Acquired ankle/foot deformity (736 70) (M21 969)   3  AF (paroxysmal atrial fibrillation) (427 31) (I48 0)   4  Anticoagulant long-term use (V58 61) (Z79 01)   5  Arthritis (716 90) (M19 90)   6  At risk for bone density loss (V49 89) (Z91 89)   7  Atrial fibrillation (427 31) (I48 91)   8  History of Breast Cancer (V10 3)   9  Difficulty walking (719 7) (R26 2)   10  Encounter for screening mammogram for breast cancer (V76 12) (Z12 31)   11  Esophageal reflux (530 81) (K21 9)   12  Foot pain, bilateral (729 5) (M79 671,M79 672)   13  Hiatal hernia (553 3) (K44 9)   14  History of Carrero's esophagus (V12 79) (Z87 19)   15  History of fall (V15 88) (Z91 81)   16  Hypertension (401 9) (I10)   17  Leg pain, left (729 5) (E96 463)   18  Lichen sclerosus et atrophicus (701 0) (L90 0)   19  Lumbar radiculopathy (724 4) (M54 16)   20  Multiple lung nodules (793 19) (R91 8)   21  Obesity (278 00) (E66 9)   22  Onychomycosis (110 1) (B35 1)   23  Osteopenia (733 90) (M85 80)   24  Pacemaker (V45 01) (Z95 0)   25  Peripheral neuropathy (356 9) (G62 9)   26  Pes planus of both feet (734) (M21 41,M21 42)   27  Plantar fasciitis of left foot (728 71) (M72 2)   28  Restless legs syndrome (333 94) (G25 81)     Past Medical History   · History of Abnormal glucose (790 29) (R73 09)   · Atrial fibrillation (427 31) (I48 91)   · History of Breast Cancer (V10 3)   · 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)   · Esophageal reflux (530 81) (K21 9)   · Denied: History of Exposure To STD   · History of Gross hematuria (599 71) (R31 0)   · History of Hematoma (924 9) (T14 8XXA)   · History of Hematoma of lower extremity, right, initial encounter (924 5) (S80 11XA)   · History of backache (V13 59) (Z87 39)   · History of Carrero's esophagus (V12 79) (Z87 19)   · History of cataract (V12 49) (Z86 69)   · History of chest pain (V13 89) (Z87 898)   · History of fall (V15 88) (Z91 81)   · History of hematuria (V13 09) (Z87 448)   · History of postmenopausal hormone replacement therapy (V87 49) (Z92 29)   · History of shortness of breath (V13 89) (S12 507)   · History of urinary incontinence (V13 09) (Z87 898)   · History of Neck pain (723 1) (M54 2)   · Normal delivery (650) (O80,Z37  9)   · History of Right knee pain (719 46) (M25 561)   · History of Ringing In The Ears (Tinnitus) (388 30)   · History of Skin rash (782 1) (R21)   · History of Traumatic blister of right lower extremity, initial encounter (916 2) (F60 083F)   · History of UTI (lower urinary tract infection) (599 0) (N39 0)     The active problems and past medical history were reviewed and updated today       Surgical History   · Denied: History of Abnormal Pap Smear Of Cervix   · History of Breast Surgery Lumpectomy   · History of Cataract Surgery   · History of Diagnostic Cystoscopy   · History of Excision Lesion Of Meniscus Or Capsule Knee   · History of Pacemaker - Pulse Generator Replacement   · History of Pacemaker Placement   · History of Pacemaker Placement   · History of Radiation Therapy   · History of Total Abdominal Hysterectomy With Removal Of Both Ovaries     The surgical history was reviewed and updated today        Family History  Mother    · Denied: Family history of Alcoholism and drug addiction in family   · Denied: Family history of Anxiety and depression  Father    · Denied: Family history of Alcoholism and drug addiction in family   · Denied: Family history of Anxiety and depression   · Family history of Coronary Artery Disease (V17 49)   · Family history of abdominal aortic aneurysm (V17 49) (Z82 49)  Child    · Denied: Family history of Alcoholism and drug addiction in family   · Denied: Family history of Anxiety and depression  Sibling    · Denied: Family history of Alcoholism and drug addiction in family   · Denied: Family history of Anxiety and depression  Sister    · Family history of Breast Cancer (V16 3)  Maternal Aunt    · Family history of Colon Cancer (V16 0)   · Family history of Colon Cancer (V16 0)  Family History    · Denied: Family history of Diabetes Mellitus   · Denied: Family history of Stroke Syndrome     The family history was reviewed and updated today        Social History      · Being A Social Drinker   · Denied: History of Drug Use   · Former smoker (V15 82) (U98 166)   · 25 pack years, quit age 41   · Marital History - Currently    · Retired From Work   · 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  The social history was reviewed and updated today       Current Meds   1  Clobetasol Propionate 0 05 % External Ointment;  Apply to affected area twice weekly; Therapy: 10NER4394 to (Last Rx:25Oct2017)  Requested for: 25Oct2017 Ordered   2  Gabapentin 800 MG Oral Tablet; TAKE 1 TABLET 4 TIMES DAILY; Therapy: 37EAH1861 to Recorded   3  Lisinopril 40 MG Oral Tablet; TAKE ONE TABLET BY MOUTH EVERY DAY AS DIRECTED; Therapy: 18YCM4971 to (Evaluate:22Mar2018)  Requested for: 27Mar2017; Last Rx:27Mar2017 Ordered   4  Metoprolol Tartrate 50 MG Oral Tablet; TAKE 1 TABLET TWICE DAILY  Requested for: 21XNB2583; Last Rx:27Mar2017 Ordered   5  Multivitamins CAPS; TAKE 1 CAPSULE DAILY; Therapy: (Recorded:11Mar2014) to Recorded   6  Pramipexole Dihydrochloride 0 5 MG Oral Tablet; TAKE 1 5 TABLETS Bedtime; Therapy: 20HIG5583 to (Last Rx:95Zcf0315) Ordered   7  Probiotic Oral Packet; Therapy: 25Oct2017 to Recorded   8  RaNITidine HCl - 150 MG Oral Tablet; TAKE 1 TABLET AT BEDTIME; Therapy: 11BZR4613 to [id number])  Requested for: 63CQX6229; Last Rx:30Oct2017 Ordered   9  Savella 50 MG Oral Tablet; TAKE ONE TAB TWICE DAILY;  Therapy: 55RZI3895 to (Last Rx:20Nov2017)  Requested for: 20Nov2017 Ordered   10  Savella 50 MG Oral Tablet;  Therapy: 85UUZ7722 to Recorded   11  Savella Titration Pack 12 5 & 25 & 50 MG Oral Miscellaneous; as directed; [address]: 67FHQ1632 to (Evaluate:10Oct2017)  Requested for: 26Sep2017; Last  Rx:26Sep2017 Ordered   12  Warfarin Sodium 5 MG Oral Tablet; take 1 to 1 1/2 tabs by mouth daily or sa directed; [facility]: 46OGV9711 to (Evaluate:23Mar2018)  Requested for: 30DPP0846; Last  Rx:28Mar2017 Ordered     The medication list was reviewed and updated today        Allergies  1  duloxetine   2  LevoFLOXacin TABS   3  Cephalexin CAPS   4  Erythromycin Base TABS   5  Keflex TABS   6  Penicillins   7  Sulfa Drugs     Vitals        Heart Rate 78   Respiration 16   Systolic 266   Diastolic 81   Height 5 ft 4 in   Weight 233 lb    BMI Calculated 39 99   BSA Calculated 2 09      Physical Exam  Left Foot: Appearance: Normal except as noted: excessive pronation-- and-- pes planus  Tenderness: None except the lisfranc joint-- and-- medial longitudinal arch  ROM: subtalar motion was restricted  Right Foot: Appearance: Normal except as noted: excessive pronation-- and-- pes planus  Tenderness: None except the lisfranc joint-- and-- medial longitudinal arch  ROM: subtalar motion was restricted   Left Ankle: ROM: limited ROM in all planes   Right Ankle: ROM: limited ROM in all planes   Neurological Exam: performed  Light touch was decreased bilaterally  Vibratory sensation was decreased in both first metatarsophalangeal joints  Deep tendon reflexes: achilles reflex absent bilateraly-- and-- 4/5 L5 testing bilateral    Vascular Exam: performed Dorsalis pedis pulses were diminished bilaterally  Posterior tibial pulses were diminished bilaterally  Dependence rubor was present bilaterally  Capillary refill time was Negative digital hair noted, but-- between 1-3 seconds bilaterally  Toenails: All of the toenails were elongated,-- hypertrophied,-- discolored-- and--   Hyperkeratosis: present on both first toes  Shoe Gear Evaluation: performed ()  Recommendation(s): SAS style       Procedure  Nails debrided  Bilateral preulcerative lesions debrided as well  Procedure without pain or complication   Refill of Savella ordered          The following portions of the patient's history were reviewed and updated as appropriate: allergies, current medications, past family history, past medical history, past social history, past surgical history and problem list      Review of Systems       Objective:      Foot Exam     Right Foot/Ankle      Inspection and Palpation  Skin Exam: dry skin and skin intact;      Neurovascular  Dorsalis pedis: 1+  Posterior tibial: 1+        Left Foot/Ankle       Inspection and Palpation  Skin Exam: dry skin and skin intact;      Neurovascular  Dorsalis pedis: 1+  Posterior tibial: 1+        Physical Exam   Cardiovascular: Pulses are weak pulses  Pulses:       Dorsalis pedis pulses are 1+ on the right side, and 1+ on the left side         Posterior tibial pulses are 1+ on the right side, and 1+ on the left side  Feet:   Right Foot:   Skin Integrity: Positive for dry skin  Left Foot:   Skin Integrity: Positive for dry skin  Patient's shoes and socks removed  Right Foot/Ankle   Right Foot Inspection  Skin Exam: skin intact and dry skin               Toe Exam: swelling  Sensory   Vibration: absent  Proprioception: absent   Monofilament testing: absent  Vascular     The right DP pulse is 1+  The right PT pulse is 1+       Left Foot/Ankle  Left Foot Inspection  Skin Exam: skin intact and dry skin              Toe Exam: swelling                   Sensory   Vibration: absent  Proprioception: absent  Monofilament: absent  Vascular     The left DP pulse is 1+  The left PT pulse is 1+  Assign Risk Category:  Deformity present; Loss of protective sensation; Weak pulses       Risk: 2           Subjective:  patient has pain in her feet with ambulation  No history of trauma      Patient ID: Natividad Ortiz is a 78 y  o  female      HPI     The following portions of the patient's history were reviewed and updated as appropriate: allergies, current medications, past family history, past medical history, past social history, past surgical history and problem list      Review of Systems       Objective:      Foot Exam     Right Foot/Ankle      Inspection and Palpation  Skin Exam: callus;      Neurovascular  Dorsalis pedis: 1+  Posterior tibial: 1+        Left Foot/Ankle       Inspection and Palpation  Skin Exam: callus;      Neurovascular  Dorsalis pedis: 1+  Posterior tibial: 1+        Physical Exam   Cardiovascular: Pulses are weak pulses  Pulses:       Dorsalis pedis pulses are 1+ on the right side, and 1+ on the left side         Posterior tibial pulses are 1+ on the right side, and 1+ on the left side     Feet:   Right Foot:   Skin Integrity: Positive for callus  Left Foot:   Skin Integrity: Positive for callus  Patient's shoes and socks removed  Right Foot/Ankle   Right Foot Inspection  Skin Exam: callus and callus               Toe Exam: swelling and erythema  Sensory   Vibration: diminished  Proprioception: diminished   Monofilament testing: diminished  Vascular  Capillary refills: elevated  The right DP pulse is 1+  The right PT pulse is 1+       Left Foot/Ankle  Left Foot Inspection  Skin Exam: callus              Toe Exam: swelling                   Sensory   Vibration: diminished  Proprioception: diminished  Monofilament: diminished  Vascular  Capillary refills: elevated  The left DP pulse is 1+  The left PT pulse is 1+  Assign Risk Category:  Deformity present;  Loss of protective sensation; Weak pulses       Risk: 2

## 2018-10-03 ENCOUNTER — ANTICOAG VISIT (OUTPATIENT)
Dept: CARDIOLOGY CLINIC | Facility: CLINIC | Age: 78
End: 2018-10-03

## 2018-10-03 ENCOUNTER — TRANSCRIBE ORDERS (OUTPATIENT)
Dept: ADMINISTRATIVE | Facility: HOSPITAL | Age: 78
End: 2018-10-03

## 2018-10-03 ENCOUNTER — APPOINTMENT (OUTPATIENT)
Dept: LAB | Facility: HOSPITAL | Age: 78
End: 2018-10-03
Payer: MEDICARE

## 2018-10-03 DIAGNOSIS — I48.91 ATRIAL FIBRILLATION, UNSPECIFIED TYPE (HCC): ICD-10-CM

## 2018-10-04 RX ORDER — GABAPENTIN 800 MG/1
TABLET ORAL
Qty: 360 TABLET | OUTPATIENT
Start: 2018-10-04

## 2018-10-05 ENCOUNTER — OFFICE VISIT (OUTPATIENT)
Dept: INTERNAL MEDICINE CLINIC | Facility: CLINIC | Age: 78
End: 2018-10-05
Payer: MEDICARE

## 2018-10-05 VITALS
HEIGHT: 64 IN | WEIGHT: 237.6 LBS | BODY MASS INDEX: 40.56 KG/M2 | HEART RATE: 104 BPM | RESPIRATION RATE: 18 BRPM | TEMPERATURE: 97.4 F | OXYGEN SATURATION: 99 % | DIASTOLIC BLOOD PRESSURE: 86 MMHG | SYSTOLIC BLOOD PRESSURE: 120 MMHG

## 2018-10-05 DIAGNOSIS — J02.9 SORE THROAT: Primary | ICD-10-CM

## 2018-10-05 DIAGNOSIS — N64.4 BREAST PAIN, LEFT: ICD-10-CM

## 2018-10-05 DIAGNOSIS — R39.9 URINARY SYMPTOM OR SIGN: ICD-10-CM

## 2018-10-05 LAB
S PYO AG THROAT QL: POSITIVE
SL AMB  POCT GLUCOSE, UA: ABNORMAL
SL AMB LEUKOCYTE ESTERASE,UA: ABNORMAL
SL AMB POCT BILIRUBIN,UA: ABNORMAL
SL AMB POCT BLOOD,UA: ABNORMAL
SL AMB POCT CLARITY,UA: ABNORMAL
SL AMB POCT COLOR,UA: YELLOW
SL AMB POCT KETONES,UA: ABNORMAL
SL AMB POCT NITRITE,UA: ABNORMAL
SL AMB POCT PH,UA: 6
SL AMB POCT SPECIFIC GRAVITY,UA: 1.02
SL AMB POCT URINE PROTEIN: 15
SL AMB POCT UROBILINOGEN: 1

## 2018-10-05 PROCEDURE — 99213 OFFICE O/P EST LOW 20 MIN: CPT | Performed by: INTERNAL MEDICINE

## 2018-10-05 PROCEDURE — 81002 URINALYSIS NONAUTO W/O SCOPE: CPT | Performed by: INTERNAL MEDICINE

## 2018-10-05 PROCEDURE — 87880 STREP A ASSAY W/OPTIC: CPT | Performed by: INTERNAL MEDICINE

## 2018-10-05 RX ORDER — CLINDAMYCIN HYDROCHLORIDE 300 MG/1
300 CAPSULE ORAL 3 TIMES DAILY
Qty: 30 CAPSULE | Refills: 0 | Status: SHIPPED | OUTPATIENT
Start: 2018-10-05 | End: 2018-10-15

## 2018-10-10 ENCOUNTER — HOSPITAL ENCOUNTER (OUTPATIENT)
Dept: ULTRASOUND IMAGING | Facility: CLINIC | Age: 78
Discharge: HOME | End: 2018-10-10
Payer: MEDICARE

## 2018-10-10 ENCOUNTER — TELEPHONE (OUTPATIENT)
Dept: INTERNAL MEDICINE CLINIC | Facility: CLINIC | Age: 78
End: 2018-10-10

## 2018-10-10 ENCOUNTER — HOSPITAL ENCOUNTER (OUTPATIENT)
Dept: MAMMOGRAPHY | Facility: CLINIC | Age: 78
Discharge: HOME | End: 2018-10-10
Payer: MEDICARE

## 2018-10-10 DIAGNOSIS — N64.4 BREAST PAIN, LEFT: ICD-10-CM

## 2018-10-10 PROCEDURE — 77065 DX MAMMO INCL CAD UNI: CPT

## 2018-10-10 PROCEDURE — 76642 ULTRASOUND BREAST LIMITED: CPT

## 2018-10-10 PROCEDURE — G0279 TOMOSYNTHESIS, MAMMO: HCPCS

## 2018-10-10 NOTE — TELEPHONE ENCOUNTER
PT  SAW DR Alesia Hinson LAST WEEK  SHE IS TAKING CLINDAMYCIN FOR STREP THROAT AND A UTI  DR Alesia Hinson TOLD HER TO CALL IF SHE HAS ANY OTHER SYMPTOMS  SHE IS NOW COUGHING AND BRINGING UP PHLEGM  PT  SAID SHE IS ALLERGIC TO PCN

## 2018-10-10 NOTE — TELEPHONE ENCOUNTER
Dr Paco Garcia is out of the office till end of next week    Turner Lopez tested positive for strep last week    Clindamycin should treat this well    If she feels worse, should make an appt to be seen    thx

## 2018-10-17 ENCOUNTER — OFFICE VISIT (OUTPATIENT)
Dept: NEUROLOGY | Facility: CLINIC | Age: 78
End: 2018-10-17
Payer: MEDICARE

## 2018-10-17 VITALS
WEIGHT: 242 LBS | SYSTOLIC BLOOD PRESSURE: 123 MMHG | DIASTOLIC BLOOD PRESSURE: 78 MMHG | HEART RATE: 88 BPM | HEIGHT: 64 IN | BODY MASS INDEX: 41.32 KG/M2

## 2018-10-17 DIAGNOSIS — G60.8 SENSORY POLYNEUROPATHY: ICD-10-CM

## 2018-10-17 DIAGNOSIS — G25.81 RLS (RESTLESS LEGS SYNDROME): ICD-10-CM

## 2018-10-17 DIAGNOSIS — E11.42 DIABETIC POLYNEUROPATHY ASSOCIATED WITH TYPE 2 DIABETES MELLITUS (HCC): Primary | ICD-10-CM

## 2018-10-17 DIAGNOSIS — I48.19 PERSISTENT ATRIAL FIBRILLATION (HCC): Primary | ICD-10-CM

## 2018-10-17 PROCEDURE — 99214 OFFICE O/P EST MOD 30 MIN: CPT | Performed by: PSYCHIATRY & NEUROLOGY

## 2018-10-17 RX ORDER — PRAMIPEXOLE DIHYDROCHLORIDE 0.5 MG/1
TABLET ORAL
Qty: 270 TABLET | Status: CANCELLED | OUTPATIENT
Start: 2018-10-17

## 2018-10-17 RX ORDER — GABAPENTIN 800 MG/1
TABLET ORAL
Qty: 360 TABLET | Status: CANCELLED | OUTPATIENT
Start: 2018-10-17

## 2018-10-17 RX ORDER — WARFARIN SODIUM 5 MG/1
TABLET ORAL
Qty: 180 TABLET | Refills: 3 | Status: SHIPPED | OUTPATIENT
Start: 2018-10-17 | End: 2019-05-07 | Stop reason: SDUPTHER

## 2018-10-17 RX ORDER — GABAPENTIN 800 MG/1
800 TABLET ORAL 4 TIMES DAILY
Qty: 360 TABLET | Refills: 1 | Status: SHIPPED | OUTPATIENT
Start: 2018-10-17 | End: 2019-01-30 | Stop reason: SDUPTHER

## 2018-10-17 RX ORDER — PRAMIPEXOLE DIHYDROCHLORIDE 0.5 MG/1
TABLET ORAL
Qty: 270 TABLET | Refills: 0 | Status: SHIPPED | OUTPATIENT
Start: 2018-10-17 | End: 2019-02-14 | Stop reason: SDUPTHER

## 2018-10-17 RX ORDER — METOPROLOL TARTRATE 50 MG/1
TABLET, FILM COATED ORAL
Qty: 180 TABLET | Refills: 3 | Status: SHIPPED | OUTPATIENT
Start: 2018-10-17 | End: 2018-11-29 | Stop reason: HOSPADM

## 2018-10-17 NOTE — PROGRESS NOTES
Patient ID: Damaso Cody is a 78 y o  female  Assessment/Plan:    Painful diabetic polyneuropathy    Restless leg syndrome    Plan continue gabapentin 800 mg q i d     Three-month supply prescription has been sent to the patient pharmacy  I did give of 1 refill on three-month supply on gabapentin  Mirapex 0 5 mg 1/2 tablet 5:00 p m  and 11:00 p m  Nguyen Clements Patient did have month and half supply at home and today I have given her another three-month supply prescription with no refill  I will see the patient in 4 months  Subjective:    79-year-old female seen in the office for diabetic painful polyneuropathy and restless leg syndrome  She was seen in June of 2018  Patient reported that in month of July few times she had this burning sensation and creepy crawling sensation on the right lateral thigh lasting few minutes and then it would repeat itself in 15 minute  And sometime in month of September she was feeling the large needle poking in the calf muscles on the left leg lasting 10-15 seconds but then came several times for an hour  In month of October patient had a in her shakiness with quivering feeling  And in 1 given week she had occur also experience burning and numbness in both hands  Patient also had a sensation of of wearing the heavy boat sensation in the both feet happened 3 consecutive days  Presently patient is on gabapentin 800 mg 4 times a day and the pramipexole 0 5 mg twice a daily  Patient does not believe need to change any dosage at this time  Pt denies double vision, blurred vision, transient monocular blindness, vertigo, tinnitus, hearing loss, slurred speech, difficulty expressing and understanding, dysphasia, and focal weakness, numbness, imbalance and incoordination  Pt denies bladder and bowel urgency, frequency and incontinence     Pt denies double vision, blurred vision, transient monocular blindness, vertigo, tinnitus, hearing loss, slurred speech, difficulty expressing and understanding, dysphasia, and focal weakness, numbness, imbalance and incoordination  Pt denies bladder and bowel urgency, frequency and incontinence         Past Medical History:   Diagnosis Date    Atrial fibrillation ([facility] )     Carrero's esophagus     last assessed: 1/23/2018    Breast cancer ([facility] )     stage 1 (left), given adjuvant radiation with Arimidex x 5 years    Cancer [facility])     Left Breast, Lumpectomy    Cataract     last assessed: 3/11/2014    Dysplasia of toenail     last assessed: 8/29/2017    Esophageal reflux     GERD (gastroesophageal reflux disease)     Gross hematuria     last assessed: 2/19/2015    Hematuria     Hiatal hernia     Hypertension     Irregular heart beat     AFIB    Mixed sensory-motor polyneuropathy     Neuropathy     Obesity     Pacemaker     Paroxysmal atrial fibrillation (HCC)     Peripheral neuropathy     Rectal bleeding     Restless leg syndrome     Shortness of breath     last assessed: 1/11/2016       Family History   Problem Relation Age of Onset    Hypertension Mother     Heart disease Father     Aneurysm Father     Coronary artery disease Father         in his 76s with aneurysm    Aortic aneurysm Father         abdominal    Scleroderma Sister     Breast cancer Sister     Hypertension Sister     No Known Problems Son     Testicular cancer Son     Thyroid cancer Son     Colon cancer Maternal Aunt     Colon cancer Maternal Aunt    ,    Social History     Social History    Marital status: /Civil Union     Spouse name: N/A    Number of children: N/A    Years of education: N/A     Occupational History    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      retired     Social History Main Topics    Smoking status: Former Smoker     Years: 25 00     Quit date: 9/20/1980    Smokeless tobacco: Never Used      Comment: Quit over 30 years ago; quit age 41    Alcohol use Yes    Drug use: No    Sexual activity: Not on file     Other Topics Concern    Not on file     Social History Narrative           Current Outpatient Prescriptions on File Prior to Visit   Medication Sig Dispense Refill    Calcium Acetate, Phos Binder, (CALCIUM ACETATE PO) Take by mouth daily        Cholecalciferol (VITAMIN D3) 5000 units CAPS Take by mouth      clobetasol (TEMOVATE) 0 05 % cream Apply topically 2 (two) times a week      furosemide (LASIX) 40 mg tablet TAKE 1 TABLET(40 MG) BY MOUTH DAILY AS NEEDED FOR SWELLING 90 tablet 0    gabapentin (NEURONTIN) 800 mg tablet Take 1 tablet (800 mg total) by mouth 4 (four) times a day 360 tablet 0    lisinopril (ZESTRIL) 40 mg tablet TAKE 1 TABLET BY MOUTH  EVERY DAY AS DIRECTED 90 tablet 3    multivitamin (THERAGRAN) TABS Take 1 tablet by mouth daily      mupirocin (BACTROBAN) 2 % ointment Apply topically 3 (three) times a day prn 30 g 1    pramipexole (MIRAPEX) 0 5 mg tablet TAKE 1 AND 1/2 TABLETS BY  MOUTH AT 5PM AND 10PM 270 tablet 1    Probiotic Product (PROBIOTIC PO) Take by mouth      albuterol (VENTOLIN HFA) 90 mcg/act inhaler Inhale 2 puffs every 6 (six) hours as needed for wheezing (Patient not taking: Reported on 10/17/2018 ) 1 Inhaler 0    fluticasone (FLOVENT HFA) 110 MCG/ACT inhaler Inhale 2 puffs 2 (two) times a day Rinse mouth after use  (Patient not taking: Reported on 10/17/2018 ) 1 Inhaler 0    ranitidine (ZANTAC) 150 mg tablet Take 1 tablet by mouth as needed        [DISCONTINUED] metoprolol tartrate (LOPRESSOR) 50 mg tablet Take 50 mg by mouth 2 (two) times a day      [DISCONTINUED] warfarin (COUMADIN) 5 mg tablet Take 7 5 mg by mouth daily 10 mg on Sunday and Thursday        No current facility-administered medications on file prior to visit  Objective:    Blood pressure 123/78, pulse 88, height 5' 4" (1 626 m), weight 110 kg (242 lb)  Physical Exam   Eyes: Pupils are equal, round, and reactive to light   EOM are normal    Neck: Normal carotid pulses present  Carotid bruit is not present  Neurological: She has normal reflexes  Psychiatric: Her speech is normal        Neurological Exam  Mental Status   Oriented to person, place, time and situation  Recent and remote memory are intact  Speech is normal  Language is fluent with no aphasia  Cranial Nerves  CN II: Visual fields full to confrontation  CN III, IV, VI: Extraocular movements intact bilaterally  Pupils equal round and reactive to light bilaterally  CN V: Facial sensation is normal   CN VII: Full and symmetric facial movement  CN VIII: Hearing is normal   CN IX, X: Palate elevates symmetrically  Normal gag reflex  CN XI: Shoulder shrug strength is normal   CN XII: Tongue midline without atrophy or fasciculations  Motor   Normal muscle tone  Strength is 5/5 in all four extremities except as noted  Sensory  Sensation is intact to light touch, pinprick, vibration and proprioception in all four extremities  Light touch is normal in upper and lower extremities  Pinprick is normal in upper and lower extremities  Proprioception is normal in upper and lower extremities  Reflexes  Deep tendon reflexes are 2+ and symmetric in all four extremities with downgoing toes bilaterally  Coordination  Right: Finger-to-nose normal  Heel-to-shin normal   Left: Finger-to-nose normal  Heel-to-shin normal     Gait  Normal casual, toe, heel and tandem gait  ROS:    Review of Systems   Constitutional: Negative  HENT: Negative  Eyes: Negative  Respiratory: Negative  Cardiovascular: Negative  Gastrointestinal: Negative  Endocrine: Negative  Genitourinary: Negative  Musculoskeletal: Negative  Skin: Negative  Allergic/Immunologic: Negative  Neurological: Negative  Hematological: Negative  Psychiatric/Behavioral: Negative

## 2018-10-18 ENCOUNTER — IN-CLINIC DEVICE VISIT (OUTPATIENT)
Dept: CARDIOLOGY CLINIC | Facility: CLINIC | Age: 78
End: 2018-10-18
Payer: MEDICARE

## 2018-10-18 DIAGNOSIS — Z95.0 CARDIAC PACEMAKER: ICD-10-CM

## 2018-10-18 DIAGNOSIS — I48.20 CHRONIC ATRIAL FIBRILLATION (HCC): Primary | ICD-10-CM

## 2018-10-18 DIAGNOSIS — I49.5 SICK SINUS SYNDROME (HCC): ICD-10-CM

## 2018-10-18 PROCEDURE — 93279 PRGRMG DEV EVAL PM/LDLS PM: CPT | Performed by: INTERNAL MEDICINE

## 2018-10-18 NOTE — PROGRESS NOTES
Results for orders placed or performed in visit on 10/18/18   Cardiac EP device report    Narrative    MDT-SINGLE-PM  DEVICE INTERROGATED IN THE [facility] OFFICE: BATTERY VOLTAGE ADEQUATE (4 5 YRS)   - 17%  ALL LEAD PARAMETERS TEST WITHIN NORMAL LIMITS & STABLE  ALL OTHER TESTING WITHIN NORMAL LIMITS  MULTI VHR EPISODES WITH ALL AVAIL  EGRM/MARKERS PRESENTING AS RVR (NSVT CANNOT BE EXCLUDED)  EF - 65% (7/2017 ECHO)  LONGEST EPISODE DURATION NOTED @ 24:32 MINS, AVG V RATES DURING EPISODES @ 175-192 BPM  CHRONIC AF & PT TAKES WARFARIN, METOPROLOL TART  NO PROGRAMMING CHANGES MADE TO DEVICE PARAMETERS  NEW [employer] HOME REMOTE MONITOR ORDERED TODAY FOR PATIENT COMPLIANCE  TASK TO DR MOORE FOR VHR'S  PACEMAKER FUNCTIONING APPROPRIATELY    eb

## 2018-10-22 ENCOUNTER — ANTICOAG VISIT (OUTPATIENT)
Dept: CARDIOLOGY CLINIC | Facility: CLINIC | Age: 78
End: 2018-10-22

## 2018-10-22 ENCOUNTER — APPOINTMENT (OUTPATIENT)
Dept: LAB | Facility: CLINIC | Age: 78
End: 2018-10-22
Payer: MEDICARE

## 2018-10-22 DIAGNOSIS — I48.91 ATRIAL FIBRILLATION, UNSPECIFIED TYPE (HCC): ICD-10-CM

## 2018-10-23 ENCOUNTER — OFFICE VISIT (OUTPATIENT)
Dept: OBGYN CLINIC | Facility: HOSPITAL | Age: 78
End: 2018-10-23
Payer: MEDICARE

## 2018-10-23 ENCOUNTER — HOSPITAL ENCOUNTER (OUTPATIENT)
Dept: RADIOLOGY | Facility: HOSPITAL | Age: 78
Discharge: HOME | End: 2018-10-23
Attending: ORTHOPAEDIC SURGERY
Payer: MEDICARE

## 2018-10-23 ENCOUNTER — TELEPHONE (OUTPATIENT)
Dept: OBGYN CLINIC | Facility: HOSPITAL | Age: 78
End: 2018-10-23

## 2018-10-23 VITALS
BODY MASS INDEX: 40.97 KG/M2 | SYSTOLIC BLOOD PRESSURE: 124 MMHG | HEIGHT: 64 IN | WEIGHT: 240 LBS | HEART RATE: 90 BPM | DIASTOLIC BLOOD PRESSURE: 83 MMHG

## 2018-10-23 DIAGNOSIS — M25.562 PAIN IN BOTH KNEES, UNSPECIFIED CHRONICITY: Primary | ICD-10-CM

## 2018-10-23 DIAGNOSIS — M25.562 PAIN IN BOTH KNEES, UNSPECIFIED CHRONICITY: ICD-10-CM

## 2018-10-23 DIAGNOSIS — M25.561 PAIN IN BOTH KNEES, UNSPECIFIED CHRONICITY: Primary | ICD-10-CM

## 2018-10-23 DIAGNOSIS — M17.0 PRIMARY OSTEOARTHRITIS OF BOTH KNEES: ICD-10-CM

## 2018-10-23 DIAGNOSIS — M25.561 PAIN IN BOTH KNEES, UNSPECIFIED CHRONICITY: ICD-10-CM

## 2018-10-23 PROCEDURE — 99213 OFFICE O/P EST LOW 20 MIN: CPT | Performed by: ORTHOPAEDIC SURGERY

## 2018-10-23 PROCEDURE — 73562 X-RAY EXAM OF KNEE 3: CPT

## 2018-10-23 NOTE — TELEPHONE ENCOUNTER
Patient  [phone]  Dr Walker Marker    Patient is asking if someone can mail her a copy of her summary notes from today's visit 10/23/18  She forgot to get them as she left  Please sent to address on file thank you

## 2018-10-23 NOTE — PROGRESS NOTES
Assessment/Plan: Bilateral knee osteoarthritis     -  Marquez Torres is very happy with her plan of care from Dr Shelly Youngblood, she would like to follow up with him to receive a CSI into her left knee  No problem-specific Assessment & Plan notes found for this encounter  Problem List Items Addressed This Visit     Primary osteoarthritis of both knees      Other Visit Diagnoses     Pain in both knees, unspecified chronicity    -  Primary    Relevant Orders    XR knee 3 vw right non injury    XR knee 3 vw left non injury            Subjective:      Patient ID: Karly Douglas is a 78 y o  female  HPI Karly Douglas is a 78 y o  female who presents to the office today for bilateral knee pain  Marquez Melissa is here today for a second opinion  The patient states that she is experiencing pain to the entirety of both of her knee's  She states her pain is worse with activity and is better at rest  She notes her left knee is worse than her right  Marquez Antoineangeli states that she walks 3 days a week to stay active  [place] Buffy is seeing Dr Shelly Youngblood for her knee pain  She received her last CSI into her left knee on 07/16/18  The patient notes significant improvement in her left knee following the injection  Marquez Torres is taking arthritis strength tylenol as needed for pain control  She denies associated numbness and tinging  Marquez Torres notes her knee pain is not waking her up from sleep at night  The following portions of the patient's history were reviewed and updated as appropriate: allergies, current medications, past family history, past medical history, past social history, past surgical history and problem list     Review of Systems   Constitutional: Negative for chills, fever and unexpected weight change  HENT: Negative for hearing loss, nosebleeds and sore throat  Eyes: Negative for pain, redness and visual disturbance  Respiratory: Negative for cough, shortness of breath and wheezing      Cardiovascular: Negative for chest pain, palpitations and leg swelling  Gastrointestinal: Negative for abdominal pain, nausea and vomiting  Endocrine: Negative for polydipsia and polyuria  Genitourinary: Negative for difficulty urinating and hematuria  Musculoskeletal: Positive for arthralgias  Negative for joint swelling and myalgias  Skin: Negative for rash and wound  Neurological: Negative for dizziness, numbness and headaches  Psychiatric/Behavioral: Negative for decreased concentration, dysphoric mood and suicidal ideas  The patient is not nervous/anxious  Objective:      /83   Pulse 90   Ht 5' 4" (1 626 m)   Wt 109 kg (240 lb)   BMI 41 20 kg/m²          Physical Exam   Constitutional: She is oriented to person, place, and time  She appears well-developed and well-nourished  HENT:   Head: Normocephalic and atraumatic  Eyes: Pupils are equal, round, and reactive to light  Neck: Neck supple  Pulmonary/Chest: Effort normal and breath sounds normal    Neurological: She is alert and oriented to person, place, and time  Skin: Skin is warm and dry  Psychiatric: She has a normal mood and affect           Bilateral knee:  No baker cyst bilaterally  Minimal effusion bilaterally  Full ROM bilaterally       Imaging:  Osteoarthritis of both knee's       Scribe Attestation    I,:   Bret Salamanca am acting as a scribe while in the presence of the attending physician :        I,:   Akua Fine MD personally performed the services described in this documentation    as scribed in my presence :

## 2018-11-07 ENCOUNTER — OFFICE VISIT (OUTPATIENT)
Dept: OBGYN CLINIC | Facility: CLINIC | Age: 78
End: 2018-11-07
Payer: MEDICARE

## 2018-11-07 VITALS
HEART RATE: 79 BPM | HEIGHT: 64 IN | BODY MASS INDEX: 40.9 KG/M2 | DIASTOLIC BLOOD PRESSURE: 79 MMHG | SYSTOLIC BLOOD PRESSURE: 121 MMHG | WEIGHT: 239.6 LBS

## 2018-11-07 DIAGNOSIS — M25.562 CHRONIC PAIN OF LEFT KNEE: ICD-10-CM

## 2018-11-07 DIAGNOSIS — E11.42 DIABETIC POLYNEUROPATHY ASSOCIATED WITH TYPE 2 DIABETES MELLITUS (HCC): ICD-10-CM

## 2018-11-07 DIAGNOSIS — G89.29 CHRONIC PAIN OF LEFT KNEE: ICD-10-CM

## 2018-11-07 DIAGNOSIS — M17.12 PRIMARY OSTEOARTHRITIS OF LEFT KNEE: Primary | ICD-10-CM

## 2018-11-07 PROCEDURE — 99213 OFFICE O/P EST LOW 20 MIN: CPT | Performed by: ORTHOPAEDIC SURGERY

## 2018-11-07 PROCEDURE — 20610 DRAIN/INJ JOINT/BURSA W/O US: CPT | Performed by: ORTHOPAEDIC SURGERY

## 2018-11-07 RX ORDER — TRIAMCINOLONE ACETONIDE 40 MG/ML
40 INJECTION, SUSPENSION INTRA-ARTICULAR; INTRAMUSCULAR
Status: COMPLETED | OUTPATIENT
Start: 2018-11-07 | End: 2018-11-07

## 2018-11-07 RX ORDER — BUPIVACAINE HYDROCHLORIDE 5 MG/ML
6 INJECTION, SOLUTION EPIDURAL; INTRACAUDAL
Status: COMPLETED | OUTPATIENT
Start: 2018-11-07 | End: 2018-11-07

## 2018-11-07 RX ADMIN — TRIAMCINOLONE ACETONIDE 40 MG: 40 INJECTION, SUSPENSION INTRA-ARTICULAR; INTRAMUSCULAR at 13:34

## 2018-11-07 RX ADMIN — BUPIVACAINE HYDROCHLORIDE 6 ML: 5 INJECTION, SOLUTION EPIDURAL; INTRACAUDAL at 13:34

## 2018-11-07 NOTE — PROGRESS NOTES
Assessment/Plan:  1  Primary osteoarthritis of left knee  Large joint arthrocentesis   2  Chronic pain of left knee  Large joint arthrocentesis   3  Diabetic polyneuropathy associated with type 2 diabetes mellitus ([facility] )     4  BMI 40 0-44 9, adult ([facility] )       Jannette Infante is a pleasant 78year old female with activity related bilateral knee pain due to her underlying osteoarthritis  Her left knee continues to be more symptomatic than her right  She has been seeing significant benefit from periodic cortisone injections, and consented to and underwent a repeat left knee cortisone injection as detailed below  She tolerated the injection well without complications  Due to her diabetes, we will have to stagger an injection into the right knee  She feels that the right knee pain may be compensatory and improve after her left knee injection as it has in the past  We will plan to see her in 3 weeks for a right knee injection if needed  She will cancel if the right knee is feeling better and can return in 3 months for reevaluation of her left knee  All questions addressed  Large joint arthrocentesis  Date/Time: 11/7/2018 1:34 PM  Consent given by: patient  Site marked: site marked  Timeout: Immediately prior to procedure a time out was called to verify the correct patient, procedure, equipment, support staff and site/side marked as required   Supporting Documentation  Indications: pain   Procedure Details  Location: knee - L knee  Preparation: Patient was prepped and draped in the usual sterile fashion  Needle size: 20 G  Ultrasound guidance: no  Approach: anterolateral  Medications administered: 6 mL bupivacaine (PF) 0 5 %; 40 mg triamcinolone acetonide 40 mg/mL    Patient tolerance: patient tolerated the procedure well with no immediate complications  Dressing:  Sterile dressing applied          Subjective: Bilateral knee follow up    Patient ID: Jessica Zepeda is a 78 y o  female      Jannette Infante is a very pleasant 78year old female presenting today for follow up of her activity related bilateral knee pain, left greater than right, due to her underlying osteoarthritis  She has been receiving over 2 months benefit from her periodic cortisone injections, and is interested in undergoing another left knee injection  She did see Dr Harley Salguero for a second opinion, and was very satisfied to learn that he concurred with our plan of care  She denies new injuries  Review of Systems   Constitutional: Negative  HENT: Negative  Eyes: Negative  Respiratory: Negative  Cardiovascular: Negative  Gastrointestinal: Negative  Endocrine: Negative  Genitourinary: Negative  Musculoskeletal: Positive for arthralgias  Skin: Negative  Allergic/Immunologic: Negative  Neurological: Negative  Hematological: Negative  Psychiatric/Behavioral: Negative            Past Medical History:   Diagnosis Date    [facility] ([facility] )     Carrero's esophagus     last assessed: 1/23/2018    Breast cancer ([facility] )     stage 1 (left), given adjuvant radiation with Arimidex x 5 years    Cancer [facility])     Left Breast, Lumpectomy    Cataract     last assessed: 3/11/2014    Dysplasia of toenail     last assessed: 8/29/2017    Esophageal reflux     GERD (gastroesophageal reflux disease)     Gross hematuria     last assessed: 2/19/2015    Hematuria     Hiatal hernia     Hypertension     Irregular heart beat     AFIB    Mixed sensory-motor polyneuropathy     Neuropathy     Obesity     Pacemaker     Paroxysmal atrial fibrillation (HCC)     Peripheral neuropathy     Rectal bleeding     Restless leg syndrome     Shortness of breath     last assessed: 1/11/2016       Past Surgical History:   Procedure Laterality Date    BREAST LUMPECTOMY Left     onset: 2006    BREAST SURGERY      CARDIAC PACEMAKER PLACEMENT      x 3 2006    CATARACT EXTRACTION      CYSTOSCOPY  04/04/2014    diagnostic    HYSTERECTOMY      ALISON BSO; due to fibroid uterus; age 38    KNEE CARTILAGE SURGERY      excision lesion of meniscus or capsule knee    KNEE SURGERY      OTHER SURGICAL HISTORY      radiation therapy    SC COLONOSCOPY FLX DX W/COLLJ SPEC WHEN PFRMD N/A 2/8/2017    Procedure: COLONOSCOPY;  Surgeon: Rafael Rice MD;  [facility]; Service: Gastroenterology    SC ESOPHAGOGASTRODUODENOSCOPY TRANSORAL DIAGNOSTIC N/A 9/20/2017    Procedure: ESOPHAGOGASTRODUODENOSCOPY (EGD); Surgeon: Andrea Ruff MD;  [facility];   Service: Gastroenterology       Family History   Problem Relation Age of Onset    Hypertension Mother     Heart disease Father     Aneurysm Father     Coronary artery disease Father         in his 76s with aneurysm    Aortic aneurysm Father         abdominal    Scleroderma Sister    Manjeet Braswell Breast cancer Sister     Hypertension Sister     No Known Problems Son     Testicular cancer Son     Thyroid cancer Son     Colon cancer Maternal Aunt     Colon cancer Maternal Aunt        Social History     Occupational History    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      retired     Social History Main Topics    Smoking status: Former Smoker     Years: 25 00     Quit date: 9/20/1980    Smokeless tobacco: Never Used      Comment: Quit over 30 years ago; quit age 41    Alcohol use Yes    Drug use: No    Sexual activity: Not on file         Current Outpatient Prescriptions:     albuterol (VENTOLIN HFA) 90 mcg/act inhaler, Inhale 2 puffs every 6 (six) hours as needed for wheezing, Disp: 1 Inhaler, Rfl: 0    Calcium Acetate, Phos Binder, (CALCIUM ACETATE PO), Take by mouth daily  , Disp: , Rfl:     Cholecalciferol (VITAMIN D3) 5000 units CAPS, Take by mouth, Disp: , Rfl:     clobetasol (TEMOVATE) 0 05 % cream, Apply topically 2 (two) times a week, Disp: , Rfl:     fluticasone (FLOVENT HFA) 110 MCG/ACT inhaler, Inhale 2 puffs 2 (two) times a day Rinse mouth after use , Disp: 1 Inhaler, Rfl: 0    furosemide (LASIX) 40 mg tablet, TAKE 1 TABLET(40 MG) BY MOUTH DAILY AS NEEDED FOR SWELLING, Disp: 90 tablet, Rfl: 0    gabapentin (NEURONTIN) 800 mg tablet, Take 1 tablet (800 mg total) by mouth 4 (four) times a day, Disp: 360 tablet, Rfl: 1    lisinopril (ZESTRIL) 40 mg tablet, TAKE 1 TABLET BY MOUTH  EVERY DAY AS DIRECTED, Disp: 90 tablet, Rfl: 3    metoprolol tartrate (LOPRESSOR) 50 mg tablet, TAKE 1 TABLET BY MOUTH  TWICE A DAY, Disp: 180 tablet, Rfl: 3    multivitamin (THERAGRAN) TABS, Take 1 tablet by mouth daily, Disp: , Rfl:     mupirocin (BACTROBAN) 2 % ointment, Apply topically 3 (three) times a day prn, Disp: 30 g, Rfl: 1    pramipexole (MIRAPEX) 0 5 mg tablet, One and half tablet at 5:00 p m  and 10:00 p m , Disp: 270 tablet, Rfl: 0    Probiotic Product (PROBIOTIC PO), Take by mouth, Disp: , Rfl:     ranitidine (ZANTAC) 150 mg tablet, Take 1 tablet by mouth as needed  , Disp: , Rfl:     warfarin (COUMADIN) 5 mg tablet, TAKE 1 AND 1/2 TABLETS TO 2 TABLETS BY MOUTH DAILY OR  AS ORDERED BY PHYSICIAN, Disp: 180 tablet, Rfl: 3    Allergies   Allergen Reactions    Cephalexin     Erythromycin     Penicillins     Savella [Milnacipran]     Sulfa Antibiotics     Duloxetine Hcl Other (See Comments)     Facial pins and needles sensation    Levofloxacin Other (See Comments)     Muscular aches       Objective:  Vitals:    11/07/18 1315   BP: 121/79   Pulse: 79       Body mass index is 41 13 kg/m²  Left Knee Exam     Tenderness   The patient is experiencing tenderness in the lateral joint line, patella and medial joint line  Range of Motion   Extension: 0   Left knee flexion: 115°     Muscle Strength     The patient has normal left knee strength      Tests   Ned:  Medial - negative Lateral - negative  Lachman:  Anterior - negative      Drawer:       Anterior - negative       Varus: negative  Valgus: negative  Patellar Apprehension: negative    Other   Erythema: absent  Scars: absent  Sensation: normal  Pulse: present  Swelling: none  Effusion: no effusion present    Comments:  Crepitance on AROM and PROM-parapatellar  - PFG  No increased warmth of knee  Knee is stable at 0, 30, 90 degrees  Grossly distally NVI  Thigh and calf soft and non tender          Observations   Left Knee   Negative for effusion  Physical Exam   Constitutional: She is oriented to person, place, and time  She appears well-developed and well-nourished  Body mass index is 41 13 kg/m²  HENT:   Head: Normocephalic and atraumatic  Eyes: EOM are normal    Neck: Normal range of motion  Cardiovascular: Intact distal pulses  Pulmonary/Chest: Effort normal    Musculoskeletal:        Left knee: She exhibits no effusion  See ortho exam   Neurological: She is alert and oriented to person, place, and time  Skin: Skin is warm and dry  Psychiatric: She has a normal mood and affect   Her behavior is normal  Judgment and thought content normal

## 2018-11-26 ENCOUNTER — ANTICOAG VISIT (OUTPATIENT)
Dept: CARDIOLOGY CLINIC | Facility: CLINIC | Age: 78
End: 2018-11-26

## 2018-11-26 ENCOUNTER — APPOINTMENT (OUTPATIENT)
Dept: LAB | Facility: HOSPITAL | Age: 78
DRG: 392 | End: 2018-11-26
Payer: MEDICARE

## 2018-11-26 ENCOUNTER — TRANSCRIBE ORDERS (OUTPATIENT)
Dept: ADMINISTRATIVE | Facility: HOSPITAL | Age: 78
End: 2018-11-26

## 2018-11-26 DIAGNOSIS — I48.91 ATRIAL FIBRILLATION, UNSPECIFIED TYPE (HCC): ICD-10-CM

## 2018-11-26 LAB
INR PPP: 3.46 (ref 0.86–1.16)
PROTHROMBIN TIME: 33.9 SECONDS (ref 9.4–11.7)

## 2018-11-26 PROCEDURE — 36415 COLL VENOUS BLD VENIPUNCTURE: CPT

## 2018-11-26 PROCEDURE — 85610 PROTHROMBIN TIME: CPT

## 2018-11-27 ENCOUNTER — APPOINTMENT (EMERGENCY)
Dept: RADIOLOGY | Facility: HOSPITAL | Age: 78
DRG: 392 | End: 2018-11-27
Payer: MEDICARE

## 2018-11-27 ENCOUNTER — HOSPITAL ENCOUNTER (INPATIENT)
Facility: HOSPITAL | Age: 78
LOS: 1 days | Discharge: HOME | DRG: 392 | End: 2018-11-29
Attending: EMERGENCY MEDICINE | Admitting: INTERNAL MEDICINE
Payer: MEDICARE

## 2018-11-27 DIAGNOSIS — I48.20 CHRONIC ATRIAL FIBRILLATION (HCC): ICD-10-CM

## 2018-11-27 DIAGNOSIS — R07.9 CHEST PAIN: Primary | ICD-10-CM

## 2018-11-27 DIAGNOSIS — R10.13 EPIGASTRIC ABDOMINAL PAIN: ICD-10-CM

## 2018-11-27 PROBLEM — R60.0 PEDAL EDEMA: Status: ACTIVE | Noted: 2018-11-27

## 2018-11-27 PROBLEM — R79.1 SUPRATHERAPEUTIC INR: Status: ACTIVE | Noted: 2018-11-27

## 2018-11-27 LAB
ALBUMIN SERPL BCP-MCNC: 3.4 G/DL (ref 3.5–5)
ALP SERPL-CCNC: 46 U/L (ref 46–116)
ALT SERPL W P-5'-P-CCNC: 42 U/L (ref 12–78)
ANION GAP SERPL CALCULATED.3IONS-SCNC: 7 MMOL/L (ref 4–13)
AST SERPL W P-5'-P-CCNC: 19 U/L (ref 5–45)
BACTERIA UR QL AUTO: ABNORMAL /HPF
BASOPHILS # BLD AUTO: 0.05 THOUSANDS/ΜL (ref 0–0.1)
BASOPHILS NFR BLD AUTO: 0 % (ref 0–1)
BILIRUB DIRECT SERPL-MCNC: 0.2 MG/DL (ref 0–0.2)
BILIRUB SERPL-MCNC: 0.6 MG/DL (ref 0.2–1)
BILIRUB UR QL STRIP: NEGATIVE
BUN SERPL-MCNC: 38 MG/DL (ref 5–25)
CALCIUM SERPL-MCNC: 9.9 MG/DL (ref 8.3–10.1)
CHLORIDE SERPL-SCNC: 104 MMOL/L (ref 100–108)
CLARITY UR: CLEAR
CO2 SERPL-SCNC: 30 MMOL/L (ref 21–32)
COLOR UR: YELLOW
CREAT SERPL-MCNC: 0.95 MG/DL (ref 0.6–1.3)
EOSINOPHIL # BLD AUTO: 0 THOUSAND/ΜL (ref 0–0.61)
EOSINOPHIL NFR BLD AUTO: 0 % (ref 0–6)
ERYTHROCYTE [DISTWIDTH] IN BLOOD BY AUTOMATED COUNT: 13.5 % (ref 11.6–15.1)
GFR SERPL CREATININE-BSD FRML MDRD: 59 ML/MIN/1.73SQ M
GLUCOSE SERPL-MCNC: 99 MG/DL (ref 65–140)
GLUCOSE UR STRIP-MCNC: NEGATIVE MG/DL
HCT VFR BLD AUTO: 40.5 % (ref 34.8–46.1)
HGB BLD-MCNC: 12.8 G/DL (ref 11.5–15.4)
HGB UR QL STRIP.AUTO: NEGATIVE
IMM GRANULOCYTES # BLD AUTO: 0.04 THOUSAND/UL (ref 0–0.2)
IMM GRANULOCYTES NFR BLD AUTO: 0 % (ref 0–2)
INR PPP: 2.8 (ref 0.86–1.16)
KETONES UR STRIP-MCNC: NEGATIVE MG/DL
LEUKOCYTE ESTERASE UR QL STRIP: ABNORMAL
LIPASE SERPL-CCNC: 139 U/L (ref 73–393)
LYMPHOCYTES # BLD AUTO: 2.38 THOUSANDS/ΜL (ref 0.6–4.47)
LYMPHOCYTES NFR BLD AUTO: 21 % (ref 14–44)
MAGNESIUM SERPL-MCNC: 2 MG/DL (ref 1.6–2.6)
MCH RBC QN AUTO: 31.4 PG (ref 26.8–34.3)
MCHC RBC AUTO-ENTMCNC: 31.6 G/DL (ref 31.4–37.4)
MCV RBC AUTO: 100 FL (ref 82–98)
MONOCYTES # BLD AUTO: 0.72 THOUSAND/ΜL (ref 0.17–1.22)
MONOCYTES NFR BLD AUTO: 6 % (ref 4–12)
NEUTROPHILS # BLD AUTO: 8.36 THOUSANDS/ΜL (ref 1.85–7.62)
NEUTS SEG NFR BLD AUTO: 73 % (ref 43–75)
NITRITE UR QL STRIP: NEGATIVE
NON-SQ EPI CELLS URNS QL MICRO: ABNORMAL /HPF
NRBC BLD AUTO-RTO: 0 /100 WBCS
NT-PROBNP SERPL-MCNC: 1195 PG/ML
PH UR STRIP.AUTO: 6 [PH] (ref 5–9)
PLATELET # BLD AUTO: 192 THOUSANDS/UL (ref 149–390)
PMV BLD AUTO: 11.1 FL (ref 8.9–12.7)
POTASSIUM SERPL-SCNC: 4.4 MMOL/L (ref 3.5–5.3)
PROT SERPL-MCNC: 7.1 G/DL (ref 6.4–8.2)
PROT UR STRIP-MCNC: NEGATIVE MG/DL
PROTHROMBIN TIME: 27.7 SECONDS (ref 9.4–11.7)
RBC # BLD AUTO: 4.07 MILLION/UL (ref 3.81–5.12)
RBC #/AREA URNS AUTO: ABNORMAL /HPF
SODIUM SERPL-SCNC: 141 MMOL/L (ref 136–145)
SP GR UR STRIP.AUTO: 1.01 (ref 1–1.03)
TROPONIN I SERPL-MCNC: <0.02 NG/ML
TROPONIN I SERPL-MCNC: <0.02 NG/ML
UROBILINOGEN UR QL STRIP.AUTO: 0.2 E.U./DL
WBC # BLD AUTO: 11.55 THOUSAND/UL (ref 4.31–10.16)
WBC #/AREA URNS AUTO: ABNORMAL /HPF

## 2018-11-27 PROCEDURE — 84484 ASSAY OF TROPONIN QUANT: CPT | Performed by: NURSE PRACTITIONER

## 2018-11-27 PROCEDURE — 83880 ASSAY OF NATRIURETIC PEPTIDE: CPT | Performed by: EMERGENCY MEDICINE

## 2018-11-27 PROCEDURE — 99284 EMERGENCY DEPT VISIT MOD MDM: CPT

## 2018-11-27 PROCEDURE — 93005 ELECTROCARDIOGRAM TRACING: CPT

## 2018-11-27 PROCEDURE — C9113 INJ PANTOPRAZOLE SODIUM, VIA: HCPCS | Performed by: EMERGENCY MEDICINE

## 2018-11-27 PROCEDURE — 85610 PROTHROMBIN TIME: CPT | Performed by: NURSE PRACTITIONER

## 2018-11-27 PROCEDURE — 87081 CULTURE SCREEN ONLY: CPT | Performed by: INTERNAL MEDICINE

## 2018-11-27 PROCEDURE — 84484 ASSAY OF TROPONIN QUANT: CPT | Performed by: EMERGENCY MEDICINE

## 2018-11-27 PROCEDURE — 80048 BASIC METABOLIC PNL TOTAL CA: CPT | Performed by: EMERGENCY MEDICINE

## 2018-11-27 PROCEDURE — 71045 X-RAY EXAM CHEST 1 VIEW: CPT

## 2018-11-27 PROCEDURE — 80076 HEPATIC FUNCTION PANEL: CPT | Performed by: EMERGENCY MEDICINE

## 2018-11-27 PROCEDURE — 83690 ASSAY OF LIPASE: CPT | Performed by: EMERGENCY MEDICINE

## 2018-11-27 PROCEDURE — 36415 COLL VENOUS BLD VENIPUNCTURE: CPT | Performed by: EMERGENCY MEDICINE

## 2018-11-27 PROCEDURE — 1123F ACP DISCUSS/DSCN MKR DOCD: CPT | Performed by: INTERNAL MEDICINE

## 2018-11-27 PROCEDURE — 83735 ASSAY OF MAGNESIUM: CPT | Performed by: EMERGENCY MEDICINE

## 2018-11-27 PROCEDURE — 85025 COMPLETE CBC W/AUTO DIFF WBC: CPT | Performed by: EMERGENCY MEDICINE

## 2018-11-27 PROCEDURE — 81001 URINALYSIS AUTO W/SCOPE: CPT | Performed by: EMERGENCY MEDICINE

## 2018-11-27 PROCEDURE — 99220 PR INITIAL OBSERVATION CARE/DAY 70 MINUTES: CPT | Performed by: NURSE PRACTITIONER

## 2018-11-27 RX ORDER — GABAPENTIN 400 MG/1
800 CAPSULE ORAL 4 TIMES DAILY
Status: DISCONTINUED | OUTPATIENT
Start: 2018-11-27 | End: 2018-11-29 | Stop reason: HOSPADM

## 2018-11-27 RX ORDER — ASPIRIN 81 MG/1
81 TABLET, CHEWABLE ORAL DAILY
Status: DISCONTINUED | OUTPATIENT
Start: 2018-11-28 | End: 2018-11-29 | Stop reason: HOSPADM

## 2018-11-27 RX ORDER — MELATONIN
1000 DAILY
Status: DISCONTINUED | OUTPATIENT
Start: 2018-11-28 | End: 2018-11-29 | Stop reason: HOSPADM

## 2018-11-27 RX ORDER — LISINOPRIL 20 MG/1
40 TABLET ORAL DAILY
Status: DISCONTINUED | OUTPATIENT
Start: 2018-11-28 | End: 2018-11-29 | Stop reason: HOSPADM

## 2018-11-27 RX ORDER — FUROSEMIDE 10 MG/ML
20 INJECTION INTRAMUSCULAR; INTRAVENOUS ONCE
Status: COMPLETED | OUTPATIENT
Start: 2018-11-27 | End: 2018-11-27

## 2018-11-27 RX ORDER — FUROSEMIDE 40 MG/1
40 TABLET ORAL DAILY
Status: DISCONTINUED | OUTPATIENT
Start: 2018-11-28 | End: 2018-11-29 | Stop reason: HOSPADM

## 2018-11-27 RX ORDER — PANTOPRAZOLE SODIUM 40 MG/1
40 INJECTION, POWDER, FOR SOLUTION INTRAVENOUS ONCE
Status: COMPLETED | OUTPATIENT
Start: 2018-11-27 | End: 2018-11-27

## 2018-11-27 RX ORDER — ONDANSETRON 2 MG/ML
4 INJECTION INTRAMUSCULAR; INTRAVENOUS EVERY 6 HOURS PRN
Status: DISCONTINUED | OUTPATIENT
Start: 2018-11-27 | End: 2018-11-29 | Stop reason: HOSPADM

## 2018-11-27 RX ORDER — ASPIRIN 81 MG/1
324 TABLET, CHEWABLE ORAL ONCE
Status: COMPLETED | OUTPATIENT
Start: 2018-11-27 | End: 2018-11-27

## 2018-11-27 RX ORDER — PRAMIPEXOLE DIHYDROCHLORIDE 0.5 MG/1
0.5 TABLET ORAL 2 TIMES DAILY
Status: DISCONTINUED | OUTPATIENT
Start: 2018-11-27 | End: 2018-11-29 | Stop reason: HOSPADM

## 2018-11-27 RX ORDER — METOPROLOL TARTRATE 50 MG/1
50 TABLET, FILM COATED ORAL 2 TIMES DAILY
Status: DISCONTINUED | OUTPATIENT
Start: 2018-11-28 | End: 2018-11-28

## 2018-11-27 RX ADMIN — PANTOPRAZOLE SODIUM 40 MG: 40 INJECTION, POWDER, FOR SOLUTION INTRAVENOUS at 20:01

## 2018-11-27 RX ADMIN — PRAMIPEXOLE DIHYDROCHLORIDE 0.5 MG: 0.5 TABLET ORAL at 23:13

## 2018-11-27 RX ADMIN — FUROSEMIDE 20 MG: 10 INJECTION, SOLUTION INTRAMUSCULAR; INTRAVENOUS at 23:18

## 2018-11-27 RX ADMIN — GABAPENTIN 800 MG: 400 CAPSULE ORAL at 23:13

## 2018-11-27 RX ADMIN — ASPIRIN 81 MG 324 MG: 81 TABLET ORAL at 23:12

## 2018-11-27 NOTE — ED PROVIDER NOTES
History  Chief Complaint   Patient presents with    Heartburn     pt presents to the ed with indigestion x 3 days  pt states that it is constant       79 y/o female presents with chest discomfort ongoing for 2 days today has been continuous, denies any dyspnea, vomiting, nausea, diaphoresis, pleurisy or any other symptoms  History of DM2 not recent stress test  No other complaints at this time  non smoker, positive family history significant for CAD  History provided by:  Patient   used: No        Prior to Admission Medications   Prescriptions Last Dose Informant Patient Reported? Taking?    Calcium Acetate, Phos Binder, (CALCIUM ACETATE PO)  Self Yes No   Sig: Take by mouth daily     Cholecalciferol (VITAMIN D3) 5000 units CAPS  Self Yes No   Sig: Take by mouth   Probiotic Product (PROBIOTIC PO)  Self Yes No   Sig: Take by mouth   clobetasol (TEMOVATE) 0 05 % cream  Self Yes No   Sig: Apply topically 2 (two) times a week   furosemide (LASIX) 40 mg tablet  Self No No   Sig: TAKE 1 TABLET(40 MG) BY MOUTH DAILY AS NEEDED FOR SWELLING   Patient taking differently: 20 mg oral daily   gabapentin (NEURONTIN) 800 mg tablet   No No   Sig: Take 1 tablet (800 mg total) by mouth 4 (four) times a day   lisinopril (ZESTRIL) 40 mg tablet  Self No No   Sig: TAKE 1 TABLET BY MOUTH  EVERY DAY AS DIRECTED   metoprolol tartrate (LOPRESSOR) 50 mg tablet   No No   Sig: TAKE 1 TABLET BY MOUTH  TWICE A DAY   multivitamin (THERAGRAN) TABS  Self Yes No   Sig: Take 1 tablet by mouth daily   mupirocin (BACTROBAN) 2 % ointment  Self No No   Sig: Apply topically 3 (three) times a day prn   pramipexole (MIRAPEX) 0 5 mg tablet   No No   Sig: One and half tablet at 5:00 p m  and 10:00 p m    warfarin (COUMADIN) 5 mg tablet   No No   Sig: TAKE 1 AND 1/2 TABLETS TO 2 TABLETS BY MOUTH DAILY OR  AS ORDERED BY PHYSICIAN      Facility-Administered Medications: None       Past Medical History:   Diagnosis Date    [facility] )     Carrero's esophagus     last assessed: 1/23/2018    Breast cancer ([facility] )     stage 1 (left), given adjuvant radiation with Arimidex x 5 years    Cancer [facility])     Left Breast, Lumpectomy    Cataract     last assessed: 3/11/2014    Dysplasia of toenail     last assessed: 8/29/2017    Esophageal reflux     GERD (gastroesophageal reflux disease)     Gross hematuria     last assessed: 2/19/2015    Hematuria     Hiatal hernia     Hypertension     Irregular heart beat     AFIB    Mixed sensory-motor polyneuropathy     Neuropathy     Obesity     Pacemaker     Paroxysmal atrial fibrillation (HCC)     Peripheral neuropathy     Rectal bleeding     Restless leg syndrome     Shortness of breath     last assessed: 1/11/2016       Past Surgical History:   Procedure Laterality Date    BREAST LUMPECTOMY Left     onset: 2006    BREAST SURGERY      CARDIAC PACEMAKER PLACEMENT      x 3 2006    CATARACT EXTRACTION      CYSTOSCOPY  04/04/2014    diagnostic    HYSTERECTOMY      ALISON BSO; due to fibroid uterus; age 38   Myrna Mare KNEE CARTILAGE SURGERY      excision lesion of meniscus or capsule knee    KNEE SURGERY      OTHER SURGICAL HISTORY      radiation therapy    NM COLONOSCOPY FLX DX W/COLLJ SPEC WHEN PFRMD N/A 2/8/2017    Procedure: COLONOSCOPY;  Surgeon: Fanny Baugh MD;  [facility]; Service: Gastroenterology    NM ESOPHAGOGASTRODUODENOSCOPY TRANSORAL DIAGNOSTIC N/A 9/20/2017    Procedure: ESOPHAGOGASTRODUODENOSCOPY (EGD); Surgeon: Obdulio Barnett MD;  [facility];   Service: Gastroenterology       Family History   Problem Relation Age of Onset    Hypertension Mother     Heart disease Father     Aneurysm Father     Coronary artery disease Father         in his 76s with aneurysm    Aortic aneurysm Father         abdominal    Scleroderma Sister     Breast cancer Sister     Hypertension Sister     No Known Problems Son     Testicular cancer Son     Thyroid cancer Son     Colon cancer Maternal Aunt     Colon cancer Maternal Aunt      I have reviewed and agree with the history as documented  Social History   Substance Use Topics    Smoking status: Former Smoker     Years: 25 00     Quit date: 9/20/1980    Smokeless tobacco: Never Used      Comment: Quit over 30 years ago; quit age 41    Alcohol use Yes        Review of Systems   All other systems reviewed and are negative  Physical Exam  Physical Exam   Constitutional: She is oriented to person, place, and time  She appears well-developed and well-nourished  HENT:   Head: Normocephalic and atraumatic  Eyes: Pupils are equal, round, and reactive to light  EOM are normal    Neck: Normal range of motion  Neck supple  Cardiovascular: Normal rate and regular rhythm  Pulmonary/Chest: Effort normal and breath sounds normal    Abdominal: Soft  Bowel sounds are normal    Musculoskeletal: Normal range of motion  Neurological: She is alert and oriented to person, place, and time  Skin: Skin is warm and dry  Psychiatric: She has a normal mood and affect  Nursing note and vitals reviewed        Vital Signs  ED Triage Vitals [11/27/18 1746]   Temperature Pulse Respirations Blood Pressure SpO2   98 7 °F (37 1 °C) 83 18 111/79 96 %      Temp Source Heart Rate Source Patient Position - Orthostatic VS BP Location FiO2 (%)   Tympanic Monitor -- -- --      Pain Score       --           Vitals:    11/27/18 1815 11/27/18 1830 11/27/18 1845 11/27/18 2015   BP:    124/60   Pulse: 80 78 80 74       Visual Acuity      ED Medications  Medications   pantoprazole (PROTONIX) injection 40 mg (40 mg Intravenous Given 11/27/18 2001)       Diagnostic Studies  Results Reviewed     Procedure Component Value Units Date/Time    Hepatic function panel [[id number]]  (Abnormal) Collected:  11/27/18 1850    Lab Status:  Final result Specimen:  Blood from Arm, Right Updated:  11/27/18 1928     Total Bilirubin 0 60 mg/dL      Bilirubin, Direct 0 20 mg/dL      Alkaline Phosphatase 46 U/L      AST 19 U/L      ALT 42 U/L      Total Protein 7 1 g/dL      Albumin 3 4 (L) g/dL     Lipase [[id number]]  (Normal) Collected:  11/27/18 1850    Lab Status:  Final result Specimen:  Blood from Arm, Right Updated:  11/27/18 1928     Lipase 139 u/L     B-type natriuretic peptide [[id number]]  (Abnormal) Collected:  11/27/18 1850    Lab Status:  Final result Specimen:  Blood from Arm, Right Updated:  11/27/18 1928     NT-proBNP 1,195 (H) pg/mL     Magnesium [[id number]]  (Normal) Collected:  11/27/18 1850    Lab Status:  Final result Specimen:  Blood from Arm, Right Updated:  11/27/18 1928     Magnesium 2 0 mg/dL     Troponin I [[id number]]  (Normal) Collected:  11/27/18 1850    Lab Status:  Final result Specimen:  Blood from Arm, Right Updated:  11/27/18 1926     Troponin I <0 02 ng/mL     Basic metabolic panel [[id number]]  (Abnormal) Collected:  11/27/18 1850    Lab Status:  Final result Specimen:  Blood from Arm, Right Updated:  11/27/18 1921     Sodium 141 mmol/L      Potassium 4 4 mmol/L      Chloride 104 mmol/L      CO2 30 mmol/L      ANION GAP 7 mmol/L      BUN 38 (H) mg/dL      Creatinine 0 95 mg/dL      Glucose 99 mg/dL      Calcium 9 9 mg/dL      eGFR 59 ml/min/1 73sq m     Narrative:         National Kidney Disease Education Program recommendations are as follows:  GFR calculation is accurate only with a steady state creatinine  Chronic Kidney disease less than 60 ml/min/1 73 sq  meters  Kidney failure less than 15 ml/min/1 73 sq  meters      Urine Microscopic [[id number]]  (Abnormal) Collected:  11/27/18 1852    Lab Status:  Final result Specimen:  Urine from Urine, Clean Catch Updated:  11/27/18 1909     RBC, UA None Seen /hpf      WBC, UA 10-20 (A) /hpf      Epithelial Cells Occasional /hpf      Bacteria, UA Moderate (A) /hpf     UA w Reflex to Microscopic [[id number]]  (Abnormal) Collected:  11/27/18 1852    Lab Status:  Final result Specimen:  Urine from Urine, Clean Catch Updated:  11/27/18 1903     Color, UA Yellow     Clarity, UA Clear     Specific Gravity, UA 1 010     pH, UA 6 0     Leukocytes, UA Small (A)     Nitrite, UA Negative     Protein, UA Negative mg/dl      Glucose, UA Negative mg/dl      Ketones, UA Negative mg/dl      Urobilinogen, UA 0 2 E U /dl      Bilirubin, UA Negative     Blood, UA Negative    CBC and differential [[id number]]  (Abnormal) Collected:  11/27/18 1850    Lab Status:  Final result Specimen:  Blood from Arm, Right Updated:  11/27/18 1859     WBC 11 55 (H) Thousand/uL      RBC 4 07 Million/uL      Hemoglobin 12 8 g/dL      Hematocrit 40 5 %       (H) fL      MCH 31 4 pg      MCHC 31 6 g/dL      RDW 13 5 %      MPV 11 1 fL      Platelets 932 Thousands/uL      nRBC 0 /100 WBCs      Neutrophils Relative 73 %      Immat GRANS % 0 %      Lymphocytes Relative 21 %      Monocytes Relative 6 %      Eosinophils Relative 0 %      Basophils Relative 0 %      Neutrophils Absolute 8 36 (H) Thousands/µL      Immature Grans Absolute 0 04 Thousand/uL      Lymphocytes Absolute 2 38 Thousands/µL      Monocytes Absolute 0 72 Thousand/µL      Eosinophils Absolute 0 00 Thousand/µL      Basophils Absolute 0 05 Thousands/µL                  XR chest 1 view portable    (Results Pending)              Procedures  ECG 12 Lead Documentation  Date/Time: 11/27/2018 6:11 PM  Performed by: Jourdan Hubbard by: Laurie Roach     ECG reviewed by me, the ED Provider: yes    Patient location:  ED  Previous ECG:     Previous ECG:  Unavailable    Comparison to cardiac monitor: Yes    Interpretation:     Interpretation: non-specific    Rate:     ECG rate assessment: normal    Rhythm:     Rhythm: atrial fibrillation    Ectopy:     Ectopy: none    QRS:     QRS axis:  Normal  Conduction:     Conduction: normal    ST segments:     ST segments:  Non-specific  T waves:     T waves: non-specific               Phone Contacts  ED Phone Contact    ED Course MDM  Number of Diagnoses or Management Options  Chest pain:   Diagnosis management comments: Patient evaluated with labs, imaging,EKG, Reviewed results discussed with patient  Patient given protonix, aspirin  Patient admitted to hospitalist service for further evaluation and management  Patient verbalized understanding of results and agreed with the plan  Amount and/or Complexity of Data Reviewed  Clinical lab tests: ordered and reviewed  Tests in the radiology section of CPT®: reviewed and ordered  Tests in the medicine section of CPT®: ordered and reviewed    Patient Progress  Patient progress: stable    CritCare Time    Disposition  Final diagnoses:   Chest pain     Time reflects when diagnosis was documented in both MDM as applicable and the Disposition within this note     Time User Action Codes Description Comment    11/27/2018  7:54 PM Ankit Patino Add [R07 9] Chest pain       ED Disposition     ED Disposition Condition Comment    Admit         Follow-up Information    None         Patient's Medications   Discharge Prescriptions    No medications on file     No discharge procedures on file      ED Provider  Electronically Signed by           Cathie Woo DO  11/27/18 4336

## 2018-11-28 ENCOUNTER — APPOINTMENT (OUTPATIENT)
Dept: RADIOLOGY | Facility: HOSPITAL | Age: 78
DRG: 392 | End: 2018-11-28
Payer: MEDICARE

## 2018-11-28 ENCOUNTER — APPOINTMENT (OUTPATIENT)
Dept: NON INVASIVE DIAGNOSTICS | Facility: HOSPITAL | Age: 78
DRG: 392 | End: 2018-11-28
Payer: MEDICARE

## 2018-11-28 LAB
ANION GAP SERPL CALCULATED.3IONS-SCNC: 11 MMOL/L (ref 4–13)
BUN SERPL-MCNC: 33 MG/DL (ref 5–25)
CALCIUM SERPL-MCNC: 9.4 MG/DL (ref 8.3–10.1)
CHEST PAIN STATEMENT: NORMAL
CHLORIDE SERPL-SCNC: 102 MMOL/L (ref 100–108)
CHOLEST SERPL-MCNC: 191 MG/DL (ref 50–200)
CO2 SERPL-SCNC: 28 MMOL/L (ref 21–32)
CREAT SERPL-MCNC: 0.86 MG/DL (ref 0.6–1.3)
ERYTHROCYTE [DISTWIDTH] IN BLOOD BY AUTOMATED COUNT: 13.6 % (ref 11.6–15.1)
GFR SERPL CREATININE-BSD FRML MDRD: 66 ML/MIN/1.73SQ M
GLUCOSE SERPL-MCNC: 96 MG/DL (ref 65–140)
HCT VFR BLD AUTO: 41.6 % (ref 34.8–46.1)
HDLC SERPL-MCNC: 49 MG/DL (ref 40–60)
HGB BLD-MCNC: 13.2 G/DL (ref 11.5–15.4)
LDLC SERPL CALC-MCNC: 124 MG/DL (ref 0–100)
MAX DIASTOLIC BP: 85 MMHG
MAX HEART RATE: 127 BPM
MAX PREDICTED HEART RATE: 145 BPM
MAX. SYSTOLIC BP: 146 MMHG
MCH RBC QN AUTO: 30.9 PG (ref 26.8–34.3)
MCHC RBC AUTO-ENTMCNC: 31.7 G/DL (ref 31.4–37.4)
MCV RBC AUTO: 97 FL (ref 82–98)
NONHDLC SERPL-MCNC: 142 MG/DL
PLATELET # BLD AUTO: 204 THOUSANDS/UL (ref 149–390)
PMV BLD AUTO: 11.3 FL (ref 8.9–12.7)
POTASSIUM SERPL-SCNC: 4.1 MMOL/L (ref 3.5–5.3)
PROTOCOL NAME: NORMAL
RBC # BLD AUTO: 4.27 MILLION/UL (ref 3.81–5.12)
REASON FOR TERMINATION: NORMAL
SODIUM SERPL-SCNC: 141 MMOL/L (ref 136–145)
TARGET HR FORMULA: NORMAL
TEST INDICATION: NORMAL
TIME IN EXERCISE PHASE: NORMAL
TRIGL SERPL-MCNC: 91 MG/DL
TROPONIN I SERPL-MCNC: <0.02 NG/ML
WBC # BLD AUTO: 10.99 THOUSAND/UL (ref 4.31–10.16)

## 2018-11-28 PROCEDURE — A9502 TC99M TETROFOSMIN: HCPCS

## 2018-11-28 PROCEDURE — 93016 CV STRESS TEST SUPVJ ONLY: CPT | Performed by: INTERNAL MEDICINE

## 2018-11-28 PROCEDURE — 93306 TTE W/DOPPLER COMPLETE: CPT

## 2018-11-28 PROCEDURE — 85027 COMPLETE CBC AUTOMATED: CPT | Performed by: NURSE PRACTITIONER

## 2018-11-28 PROCEDURE — 99232 SBSQ HOSP IP/OBS MODERATE 35: CPT | Performed by: INTERNAL MEDICINE

## 2018-11-28 PROCEDURE — 80061 LIPID PANEL: CPT | Performed by: NURSE PRACTITIONER

## 2018-11-28 PROCEDURE — 84484 ASSAY OF TROPONIN QUANT: CPT | Performed by: NURSE PRACTITIONER

## 2018-11-28 PROCEDURE — 78452 HT MUSCLE IMAGE SPECT MULT: CPT

## 2018-11-28 PROCEDURE — 78452 HT MUSCLE IMAGE SPECT MULT: CPT | Performed by: INTERNAL MEDICINE

## 2018-11-28 PROCEDURE — 93018 CV STRESS TEST I&R ONLY: CPT | Performed by: INTERNAL MEDICINE

## 2018-11-28 PROCEDURE — 93017 CV STRESS TEST TRACING ONLY: CPT

## 2018-11-28 PROCEDURE — 80048 BASIC METABOLIC PNL TOTAL CA: CPT | Performed by: NURSE PRACTITIONER

## 2018-11-28 PROCEDURE — 99222 1ST HOSP IP/OBS MODERATE 55: CPT | Performed by: INTERNAL MEDICINE

## 2018-11-28 PROCEDURE — 93306 TTE W/DOPPLER COMPLETE: CPT | Performed by: INTERNAL MEDICINE

## 2018-11-28 RX ORDER — METOPROLOL TARTRATE 100 MG/1
100 TABLET ORAL 2 TIMES DAILY
Status: DISCONTINUED | OUTPATIENT
Start: 2018-11-28 | End: 2018-11-29

## 2018-11-28 RX ORDER — METOPROLOL TARTRATE 5 MG/5ML
5 INJECTION INTRAVENOUS ONCE
Status: COMPLETED | OUTPATIENT
Start: 2018-11-28 | End: 2018-11-28

## 2018-11-28 RX ADMIN — METOPROLOL TARTRATE 50 MG: 50 TABLET, FILM COATED ORAL at 10:08

## 2018-11-28 RX ADMIN — ASPIRIN 81 MG 81 MG: 81 TABLET ORAL at 10:09

## 2018-11-28 RX ADMIN — GABAPENTIN 800 MG: 400 CAPSULE ORAL at 12:20

## 2018-11-28 RX ADMIN — METOPROLOL TARTRATE 100 MG: 100 TABLET ORAL at 21:48

## 2018-11-28 RX ADMIN — LISINOPRIL 40 MG: 20 TABLET ORAL at 10:08

## 2018-11-28 RX ADMIN — REGADENOSON 0.4 MG: 0.08 INJECTION, SOLUTION INTRAVENOUS at 08:53

## 2018-11-28 RX ADMIN — GABAPENTIN 800 MG: 400 CAPSULE ORAL at 10:08

## 2018-11-28 RX ADMIN — FUROSEMIDE 40 MG: 40 TABLET ORAL at 10:08

## 2018-11-28 RX ADMIN — GABAPENTIN 800 MG: 400 CAPSULE ORAL at 21:48

## 2018-11-28 RX ADMIN — PRAMIPEXOLE DIHYDROCHLORIDE 0.5 MG: 0.5 TABLET ORAL at 17:51

## 2018-11-28 RX ADMIN — METOPROLOL TARTRATE 5 MG: 1 INJECTION, SOLUTION INTRAVENOUS at 13:24

## 2018-11-28 RX ADMIN — PRAMIPEXOLE DIHYDROCHLORIDE 0.5 MG: 0.5 TABLET ORAL at 21:47

## 2018-11-28 RX ADMIN — VITAMIN D, TAB 1000IU (100/BT) 1000 UNITS: 25 TAB at 10:08

## 2018-11-28 RX ADMIN — GABAPENTIN 800 MG: 400 CAPSULE ORAL at 17:51

## 2018-11-28 NOTE — ASSESSMENT & PLAN NOTE
· Patient reports epigastric discomfort radiating up to the mid sternum since yesterday after eating steak, associated with burping and belching; the pain has been constant and it has not resolved despite taking Tums  · Patient does report she has been noncompliant with her diet eating a lot of spicy foods tomatoes and onions  · She stopped taking her ranitidine  · Patient does have a cardiac history so she is admitted to rule out ACS; last echo was July of 2017 which revealed a normal EF, elevated PA pressure, moderate TR; last stress test was 2014  · At this point will get serial EKGs and troponins  · Monitor on continuous telemetry  · Resume PPI  · Continue aspirin and statin  · Cardiology consult  ·  Yesterday started with some chest discomfort

## 2018-11-28 NOTE — UTILIZATION REVIEW
Initial Clinical Review    Admission: Date/Time/Statement: 11/27/18 1955    Orders Placed This Encounter   Procedures    Place in Observation (expected length of stay for this patient is less than two midnights)     Standing Status:   Standing     Number of Occurrences:   1     Order Specific Question:   Admitting Physician     Answer:   Erica Munoz     Order Specific Question:   Level of Care     Answer:   Med Surg [16]         ED: Date/Time/Mode of Arrival:   ED Arrival Information     Expected Arrival Acuity Means of Arrival Escorted By Service Admission Type    - 11/27/2018 17:27 Urgent Walk-In Family Member General Medicine Urgent    Arrival Complaint    indigestion          Chief Complaint:   Chief Complaint   Patient presents with    Heartburn     pt presents to the ed with indigestion x 3 days  pt states that it is constant         History of Illness: Jh Hanson is a 78 y o  female with a PMH of restless leg syndrome, Carrero's esophagitis, atrial fibrillation on Coumadin, permanent pacemaker, peripheral neuropathy, hypertension, hyperlipidemia who presents with epigastric pain radiating to the mid sternum  Patient states that over the past couple of days she has had tomatoes, onions, spicy food  She stopped her ranitidine a while ago because she did not think she needed any more  Yesterday after eating fried steak she noticed that she had epigastric discomfort which radiated up into the sternum  It was associated with belching  She took Tums with no relief  She does elicit some increased dyspnea on exertion  She became concerned that this might be cardiac prompting her to come to the emergency department today  Initial labs did not reveal any acute findings  Patient does have a significant heart murmur which she was unaware of  She takes Lasix p r n  For pedal edema and has been taking half the dose prescribed for the past 2 days as her legs have been swollen    BNP was elevated  Patient is admitted for further management  ED Vital Signs:   ED Triage Vitals   Temperature Pulse Respirations Blood Pressure SpO2   11/27/18 1746 11/27/18 1746 11/27/18 1746 11/27/18 1746 11/27/18 1746   98 7 °F (37 1 °C) 83 18 111/79 96 %      Temp Source Heart Rate Source Patient Position - Orthostatic VS BP Location FiO2 (%)   11/27/18 1746 11/27/18 1746 11/27/18 2137 11/27/18 2137 --   Tympanic Monitor Lying Right arm       Pain Score       11/27/18 2107       2        Wt Readings from Last 1 Encounters:   11/27/18 105 kg (232 lb 2 3 oz)       Vital Signs (abnormal): Abnormal Labs/Diagnostic Test Results: BUN 33 TROPONIN <0 02 X3 BNP 1195 LDL DIRECT 124 WBC 11 55>10 99, PT/INR 27 7/2 80 , UA SMALL LEUKOCYTES WBC 10-20 MODERATE BACTERIA  CXR CARDIOMEGALY  ED Treatment:   Medication Administration from 11/27/2018 1727 to 11/27/2018 2104       Date/Time Order Dose Route Action Action by Comments     11/27/2018 2001 pantoprazole (PROTONIX) injection 40 mg 40 mg Intravenous Given Claudean Bienenstock, RN           Past Medical/Surgical History:    Active Ambulatory Problems     Diagnosis Date Noted    Hiatal hernia     Atrial fibrillation ([facility] [address] ) [I48 91] 01/20/2018    Acquired deformity of foot 02/13/2018    Corns 02/13/2018    Diabetic polyneuropathy associated with type 2 diabetes mellitus ([facility] [address] ) 02/13/2018    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[facility] [address] ) 02/13/2018    Radiculopathy of lumbar region 02/13/2018    Primary osteoarthritis of both knees 02/21/2018    RLS (restless legs syndrome) 02/28/2018    Arthritis 03/11/2014    Esophageal reflux 05/08/2013    Essential hypertension 66/14/0992    Lichen sclerosus et atrophicus 05/08/2013    Multiple lung nodules 03/11/2015    Class 3 obesity due to excess calories with serious comorbidity and body mass index (BMI) of 40 0 to 44 9 in adult 04/16/2014    Osteoarthritis of left knee 01/02/2018    Osteopenia of multiple sites 04/16/2014    Peripheral neuropathy 05/08/2013    Vitamin D deficiency 03/12/2018    Dense breast tissue on mammogram 12/07/2017    Difficulty walking 08/29/2017    Flat foot 08/29/2017    Onychomycosis 08/29/2017    Carrero's esophagus with dysplasia 03/12/2018    Asthmatic bronchitis 06/25/2018    Chronic pain of left knee 07/16/2018    Mild cognitive impairment 09/14/2018    Pain in both feet 09/26/2018    GERD (gastroesophageal reflux disease)      Resolved Ambulatory Problems     Diagnosis Date Noted    Pain in both feet 02/13/2018    Sensory polyneuropathy 02/28/2018    Plantar fasciitis of left foot 09/29/2016    Paroxysmal atrial fibrillation (HCC)     Carrero's esophagus      Past Medical History:   Diagnosis Date    Atrial fibrillation (HCC)     Carrero's esophagus     [facility] [address] [facility]     [facility]     Cataract     Dysplasia of toenail     Esophageal reflux     GERD (gastroesophageal reflux disease)     Gross hematuria     Hematuria     Hiatal hernia     Hypertension     Irregular heart beat     Mixed sensory-motor polyneuropathy     Neuropathy     Obesity     Pacemaker     Paroxysmal atrial fibrillation (HCC)     Peripheral neuropathy     Rectal bleeding     Restless leg syndrome     Shortness of breath        Admitting Diagnosis: Heartburn [R12]  Chest pain [R07 9]    Age/Sex: 78 y o  female    Assessment/Plan:   * Epigastric abdominal pain   Assessment & Plan     · Patient reports epigastric discomfort radiating up to the mid sternum since yesterday after eating steak, associated with burping and belching; the pain has been constant and it has not resolved despite taking Tums  · Patient does report she has been noncompliant with her diet eating a lot of spicy foods tomatoes and onions  · She stopped taking her ranitidine  · Patient does have a cardiac history so she is admitted to rule out ACS; last echo was July of 2017 which revealed a normal EF, elevated PA pressure, moderate TR; last stress test was 2014  · At this point will get serial EKGs and troponins  · Monitor on continuous telemetry  · Resume PPI  · Continue aspirin and statin  · Cardiology consult  · Echo and stress test         Pedal edema   Assessment & Plan     · Patient states she was recently started on Lasix p r n  For pedal edema  · BNP mildly elevated at 1000 in the emergency department, chest x-ray clear, lungs clear, 2+ pedal edema bilaterally  · Patient has been taking half of her prescribed dose of Lasix for 2 days with no real improvement  · Will give 1 time dose of Lasix IV x1 and then resume the 40 mg patient should be taking daily  · Patient does have a pretty significant murmur  · Will get a repeat echo      Supratherapeutic INR   Assessment & Plan     · INR elevated  · Hold Coumadin today and repeat in the morning  · Monitor for bleeding or bruising      Pacemaker   Assessment & Plan     · Has regular pacemaker checks with her primary cardiologist      Vitamin D deficiency   Assessment & Plan     · Continue on vitamin-D      Peripheral neuropathy   Assessment & Plan     · Continue gabapentin      Essential hypertension   Assessment & Plan     · Continue lisinopril and metoprolol      RLS (restless legs syndrome)   Assessment & Plan     · Continue Mirapex      Atrial fibrillation (HCC) [I48 91]   Assessment & Plan     · Patient had 2 unsuccessful ablations and 2 cardioversions in the past  · Continue metoprolol  · INR supratherapeutic with no bleeding or bruising  · Hold Coumadin tonight and repeat in the morning            VTE Prophylaxis: Warfarin (Coumadin)  / sequential compression device   Code Status: Level 3 - DNAR and DNI     Anticipated Length of Stay:  Patient will be admitted on an Observation basis with an anticipated length of stay of  less than 2 midnights     Justification for Hospital Stay:  Chest pain, likely gastric however high-risk patient        Admission Orders:  OBSERVATION  TELE MON  ECHO STRESS TEST  CONSULT CARDIOLOGY  Scheduled Meds:   Current Facility-Administered Medications:  aspirin 81 mg Oral Daily Elham Huizar, CRNP   cholecalciferol 1,000 Units Oral Daily Elham Huizar, CRNP   furosemide 40 mg Oral Daily [place] Huizar, CRNP   gabapentin 800 mg Oral 4x Daily Elham Huizar, CRNP   lisinopril 40 mg Oral Daily Elham Huizar, CRNP   metoprolol tartrate 50 mg Oral BID [place] Huizar, CRNP   ondansetron 4 mg Intravenous Q6H PRN Elham Huizar, RICKY   pneumococcal 23-valent polysaccharide vaccine 0 5 mL Subcutaneous Prior to discharge Marilyn Kilgore MD   pramipexole 0 5 mg Oral BID Elham Huizar, RICKY     Continuous Infusions:    PRN Meds: ondansetron    pneumococcal 23-valent polysaccharide vaccine

## 2018-11-28 NOTE — ASSESSMENT & PLAN NOTE
· Patient states she was recently started on Lasix p r n  For pedal edema  · BNP mildly elevated at 1000 in the emergency department, chest x-ray clear, lungs clear, 2+ pedal edema bilaterally  · Patient has been taking half of her prescribed dose of Lasix for 2 days with no real improvement  · Patient had good diuresis with Lasix 40 milligram IV  · Continue Lasix 40 milligram p o   Daily  · Follow-up 2D echo

## 2018-11-28 NOTE — SOCIAL WORK
PT LIVES INDEPENDENTLY WITH SPOUSE, NO DME OR HHC NEEDS, USES [facility] FOR RX NEEDS, DCP IS TO HOME WHEN STABLE  DASH discussion completed  Discussed goals of making sure pt's  needs are met upon discharge, pt's preferences are taken into account, pt understands health condition, medications and symptoms to watch for after returning home and pt is aware of any follow up appointments recommended by hospital physician

## 2018-11-28 NOTE — PLAN OF CARE
CARDIOVASCULAR - ADULT     Maintains optimal cardiac output and hemodynamic stability Progressing     Absence of cardiac dysrhythmias or at baseline rhythm Progressing        DISCHARGE PLANNING     Discharge to home or other facility with appropriate resources Progressing        INFECTION - ADULT     Absence or prevention of progression during hospitalization Progressing        Knowledge Deficit     Patient/family/caregiver demonstrates understanding of disease process, treatment plan, medications, and discharge instructions Progressing        PAIN - ADULT     Verbalizes/displays adequate comfort level or baseline comfort level Progressing        Potential for Falls     Patient will remain free of falls Progressing        SKIN/TISSUE INTEGRITY - ADULT     Skin integrity remains intact Progressing

## 2018-11-28 NOTE — ASSESSMENT & PLAN NOTE
· Atypical presentation for ACS  · Serial cardiac enzymes were negative  · Patient had EGD in later part of 2017 and was diagnosed with GERD and hiatal hernia  · Patient does report she has been noncompliant with her diet eating a lot of spicy foods tomatoes and onions  · Continue PPI  ·  last echo was July of 2017 which revealed a normal EF, elevated PA pressure, moderate TR; last stress test was 2014  · Patient underwent nuclear stress test today results of which are pending  · Follow-up 2D echo

## 2018-11-28 NOTE — CONSULTS
Consultation - Cardiology   [facility] 78 y o  female MRN: [id number]  Unit/Bed#: [address] [place] [address] Encounter: [id number]    Assessment/Plan     Assessment:  1  Chest pain, atypical:  Troponins negative x3  2  Chronic atrial fibrillation with accelerated ventricular response: To unsuccessful ablations and cardioversions in the past  3  History of Carrero's esophagus   4  Hypertension  5  Moderate tricuspid valve regurgitation with mild pulmonary hypertension seen on echocardiogram of 2017   6  History sick sinus syndrome for which she has a permanent pacemaker   7  Volume overload with proBNP of 1195 most likely due to elevated heart rate and atrial fibrillation    Plan:  Patient has been admitted to the hospitalist service  1  2D echocardiogram and Lexiscan nuclear stress test have been ordered to evaluate cardiac function, structure and for any ischemia  2  Patient currently is on metoprolol 50 mg twice a day  Will increase this to 100 mg twice a day and continue monitor patient's rates with device interrogations  3  Coumadin currently on hold due to elevated INR and also cardiac testing  If patient does not require any further cardiac testing/cardiac catheterization would resume Coumadin this evening  4  Patient responded well to single dose of IV Lasix last evening for elevated proBNP  She has been using her home diuretics on an as needed basis  Would recommend she take this on a more regular basis  But hopefully with improved heart rate control her peripheral edema will improve  History of Present Illness   Physician Requesting Consult: Radha Haley MD  Reason for Consult / Principal Problem:  Chest pain  HPI: [facility] is a 78y o  year old female who presented to the emergency room at [address] [place] [address] after experiencing 3 days of epigastric pain    She notes on Saturday she experienced midsternal pain that radiated to the right side of her chest   This was described as a burning heavy sensation  She states she was very fleeting that it lasted only a moment  She does note she has never experienced this type of discomfort before  On Sunday and Monday she noted she continued to have indigestion or midsternal burning sensation  She does not note any other symptoms  She denies associated nausea, vomiting, diaphoresis, shortness of breath, palpitations, lightheadedness or dizziness  She stated she does have a history of Carrero's esophagus and thought maybe she had aggravated that because she had been eating a lot of tomatoes, spices, onions and garlic  She did take a Tums without relief  Patient subsequently came to the hospital for evaluation  Patient has a history of breast cancer for which she is status post lumpectomy on the left with radiation in 2006, patient also had implantation of permanent pacemaker, single lead chamber device in 2006 initially for sick sinus syndrome bradycardia, patient now has chronic atrial fibrillation she states probably from around 2007  She has a history of colon polyps and Carrero's esophagus  She follows with Dr Kristine Beth for Cardiology  Recent device interrogation does note chronic atrial fibrillation with periods of rapid ventricular response  Patient states at times she does note her heart rate is racing but it  does not really bother her  Previous echocardiogram from July of 2017 demonstrates ejection fraction estimated to be 65% without diagnostic regional wall motion abnormalities  Patient noted to have biatrial dilatation, no evidence of aortic stenosis or regurgitation  There was trace mitral valve regurgitation and moderate tricuspid valve regurgitation with estimated PA pressure 35-40 mm of mercury  Inpatient consult to Cardiology  Consult performed by: Tameka Meek ordered by: Renetta Ivey          Review of Systems   Constitutional: Negative for activity change, chills, fatigue and unexpected weight change  Recently  Patient notes slowing of activity but feels that is age related  She has not experienced any drastic changes recently   HENT: Negative  Eyes: Negative for photophobia, redness and visual disturbance  Respiratory: Positive for chest tightness  Negative for apnea, choking, shortness of breath and wheezing  Cardiovascular: Positive for chest pain and palpitations  Negative for leg swelling  Gastrointestinal: Negative for abdominal distention, abdominal pain, blood in stool, constipation, diarrhea, nausea and vomiting  Epigastric midsternal discomfort   Endocrine: Negative for polydipsia, polyphagia and polyuria  Genitourinary: Negative  Musculoskeletal: Positive for arthralgias, back pain and gait problem  Skin: Negative  Allergic/Immunologic: Negative  Neurological: Negative for dizziness, syncope, weakness and light-headedness  Hematological: Negative  Psychiatric/Behavioral: Negative          Historical Information   Past Medical History:   Diagnosis Date    Atrial fibrillation ([facility] )     Carrero's esophagus     last assessed: 1/23/2018    Breast cancer ([facility] )     stage 1 (left), given adjuvant radiation with Arimidex x 5 years    Cancer [facility])     Left Breast, Lumpectomy    Cataract     last assessed: 3/11/2014    Dysplasia of toenail     last assessed: 8/29/2017    Esophageal reflux     GERD (gastroesophageal reflux disease)     Gross hematuria     last assessed: 2/19/2015    Hematuria     Hiatal hernia     Hypertension     Irregular heart beat     AFIB    Mixed sensory-motor polyneuropathy     Neuropathy     Obesity     Pacemaker     Paroxysmal atrial fibrillation (HCC)     Peripheral neuropathy     Rectal bleeding     Restless leg syndrome     Shortness of breath     last assessed: 1/11/2016     Past Surgical History:   Procedure Laterality Date    BREAST LUMPECTOMY Left     onset: 2006    BREAST SURGERY      CARDIAC PACEMAKER PLACEMENT      x 3 2006    CATARACT EXTRACTION      CYSTOSCOPY  04/04/2014    diagnostic    HYSTERECTOMY      ALISON BSO; due to fibroid uterus; age 38    KNEE CARTILAGE SURGERY      excision lesion of meniscus or capsule knee    KNEE SURGERY      OTHER SURGICAL HISTORY      radiation therapy    MI COLONOSCOPY FLX DX W/COLLJ SPEC WHEN PFRMD N/A 2/8/2017    Procedure: COLONOSCOPY;  Surgeon: uJan Pablo Mckeon MD;  [facility]; Service: Gastroenterology    MI ESOPHAGOGASTRODUODENOSCOPY TRANSORAL DIAGNOSTIC N/A 9/20/2017    Procedure: ESOPHAGOGASTRODUODENOSCOPY (EGD); Surgeon: Louis Camacho MD;  Location: [facility];   Service: Gastroenterology     History   Alcohol Use    Yes     History   Drug Use No     History   Smoking Status    Former Smoker    Years: 25 00    Quit date: 9/20/1980   Smokeless Tobacco    Never Used     Comment: Quit over 30 years ago; quit age 41     Family History:   Family History   Problem Relation Age of Onset    Hypertension Mother     Heart disease Father     Aneurysm Father     Coronary artery disease Father         in his 76s with aneurysm    Aortic aneurysm Father         abdominal    Scleroderma Sister     Breast cancer Sister     Hypertension Sister     No Known Problems Son     Testicular cancer Son     Thyroid cancer Son     Colon cancer Maternal Aunt     Colon cancer Maternal Aunt        Meds/Allergies   all current active meds have been reviewed, current meds:   Current Facility-Administered Medications   Medication Dose Route Frequency    aspirin chewable tablet 81 mg  81 mg Oral Daily    cholecalciferol (VITAMIN D3) tablet 1,000 Units  1,000 Units Oral Daily    furosemide (LASIX) tablet 40 mg  40 mg Oral Daily    gabapentin (NEURONTIN) capsule 800 mg  800 mg Oral 4x Daily    lisinopril (ZESTRIL) tablet 40 mg  40 mg Oral Daily    metoprolol (LOPRESSOR) injection 5 mg  5 mg Intravenous Once    metoprolol tartrate (LOPRESSOR) tablet 50 mg  50 mg Oral BID    ondansetron [facility] injection 4 mg  4 mg Intravenous Q6H PRN    pneumococcal 23-valent polysaccharide vaccine (PNEUMOVAX-23) injection 0 5 mL  0 5 mL Subcutaneous Prior to discharge    pramipexole (MIRAPEX) tablet 0 5 mg  0 5 mg Oral BID    and PTA meds:   Prior to Admission Medications   Prescriptions Last Dose Informant Patient Reported? Taking?    Calcium Acetate, Phos Binder, (CALCIUM ACETATE PO) 11/27/2018 at Unknown time Self Yes Yes   Sig: Take by mouth daily     Cholecalciferol (VITAMIN D3) 5000 units CAPS 11/27/2018 at Unknown time Self Yes Yes   Sig: Take by mouth   Probiotic Product (PROBIOTIC PO) Past Week at Unknown time Self Yes Yes   Sig: Take by mouth   clobetasol (TEMOVATE) 0 05 % cream Past Week at Unknown time Self Yes Yes   Sig: Apply topically once a week     furosemide (LASIX) 40 mg tablet 11/27/2018 at Unknown time Self No Yes   Sig: TAKE 1 TABLET(40 MG) BY MOUTH DAILY AS NEEDED FOR SWELLING   Patient taking differently: 20 mg oral daily   gabapentin (NEURONTIN) 800 mg tablet 11/27/2018 at Unknown time  No Yes   Sig: Take 1 tablet (800 mg total) by mouth 4 (four) times a day   lisinopril (ZESTRIL) 40 mg tablet 11/27/2018 at Unknown time Self No Yes   Sig: TAKE 1 TABLET BY MOUTH  EVERY DAY AS DIRECTED   metoprolol tartrate (LOPRESSOR) 50 mg tablet 11/27/2018 at Unknown time  No Yes   Sig: TAKE 1 TABLET BY MOUTH  TWICE A DAY   multivitamin (THERAGRAN) TABS 11/27/2018 at Unknown time Self Yes Yes   Sig: Take 1 tablet by mouth daily   mupirocin (BACTROBAN) 2 % ointment Past Month at Unknown time Self No Yes   Sig: Apply topically 3 (three) times a day prn   pramipexole (MIRAPEX) 0 5 mg tablet 11/27/2018 at Unknown time  No Yes   Sig: One and half tablet at 5:00 p m  and 10:00 p m    warfarin (COUMADIN) 5 mg tablet 11/27/2018 at Unknown time  No Yes   Sig: TAKE 1 AND 1/2 TABLETS TO 2 TABLETS BY MOUTH DAILY OR  AS ORDERED BY PHYSICIAN      Facility-Administered Medications: None     Allergies   Allergen Reactions    Cephalexin Rash    Duloxetine Hcl Other (See Comments)     Facial pins and needles sensation    Erythromycin Rash    Levofloxacin Other (See Comments)     Muscular aches    Penicillins Rash    Savella [Milnacipran] Rash    Sulfa Antibiotics Rash       Objective   Vitals: Blood pressure 119/85, pulse 87, temperature 98 6 °F (37 °C), temperature source Tympanic, resp  rate 18, height 5' 4" (1 626 m), weight 105 kg (232 lb 2 3 oz), SpO2 95 %  Orthostatic Blood Pressures      Most Recent Value   Blood Pressure  119/85 filed at 11/28/2018 1070   Patient Position - Orthostatic VS  Sitting filed at 11/28/2018 0955          No intake or output data in the 24 hours ending 11/28/18 1301    Invasive Devices     Peripheral Intravenous Line            Peripheral IV 11/27/18 Right Antecubital less than 1 day                Physical Exam    Lab Results:   I have personally reviewed pertinent lab results      CBC with diff:   Results from last 7 days  Lab Units 11/28/18  0607   WBC Thousand/uL 10 99*   RBC Million/uL 4 27   HEMOGLOBIN g/dL 13 2   HEMATOCRIT % 41 6   MCV fL 97   MCH pg 30 9   MCHC g/dL 31 7   RDW % 13 6   MPV fL 11 3   PLATELETS Thousands/uL 204     CMP:   Results from last 7 days  Lab Units 11/28/18  0607 11/27/18  1850   SODIUM mmol/L 141 141   POTASSIUM mmol/L 4 1 4 4   CHLORIDE mmol/L 102 104   CO2 mmol/L 28 30   BUN mg/dL 33* 38*   CREATININE mg/dL 0 86 0 95   CALCIUM mg/dL 9 4 9 9   AST U/L  --  19   ALT U/L  --  42   ALK PHOS U/L  --  46   EGFR ml/min/1 73sq m 66 59     Troponin:   0  Lab Value Date/Time   TROPONINI <0 02 11/28/2018 0129   TROPONINI <0 02 11/27/2018 2253   TROPONINI <0 02 11/27/2018 1850     BNP:   Results from last 7 days  Lab Units 11/28/18  0607   POTASSIUM mmol/L 4 1   CHLORIDE mmol/L 102   CO2 mmol/L 28   BUN mg/dL 33*   CREATININE mg/dL 0 86   CALCIUM mg/dL 9 4   EGFR ml/min/1 73sq m 66     Coags:   Results from last 7 days  Lab Units 11/27/18  2253   INR  2 80* TSH:     Magnesium:   Results from last 7 days  Lab Units 18  1850   MAGNESIUM mg/dL 2 0     Lipid Profile:   Results from last 7 days  Lab Units 18  0607   HDL mg/dL 49   LDL CALC mg/dL 124*   TRIGLYCERIDES mg/dL 91     Imaging: I have personally reviewed pertinent reports  EK lead EKG demonstrates atrial fibrillation with a accelerated ventricular response  T-wave inversions seen in inferior leads  No previous EKG for comparison  VTE Prophylaxis: Sequential compression device Lore Vega)     Code Status: Level 3 - DNAR and DNI  Advance Directive and Living Will:      Power of :    POLST:      Counseling / Coordination of Care  Total floor / unit time spent today 60 minutes  Greater than 50% of total time was spent with the patient and / or family counseling and / or coordination of care  A description of the counseling / coordination of care: Atypical epigastric/chest pain, chronic atrial fibrillation

## 2018-11-28 NOTE — PROGRESS NOTES
Progress Note - [place]ald 1940, 78 y o  female MRN: [id number]    Unit/Bed#: 708 [facility] Encounter: [id number]    Primary Care Provider: Wing Chaya MD   Date and time admitted to hospital: 11/27/2018  5:50 PM        * Epigastric abdominal pain   Assessment & Plan    · Atypical presentation for ACS  · Serial cardiac enzymes were negative  · Patient had EGD in later part of 2017 and was diagnosed with GERD and hiatal hernia  · Patient does report she has been noncompliant with her diet eating a lot of spicy foods tomatoes and onions  · Continue PPI  ·  last echo was July of 2017 which revealed a normal EF, elevated PA pressure, moderate TR; last stress test was 2014  · Patient underwent nuclear stress test today results of which are pending  · Follow-up 2D echo       Atrial fibrillation ([address] ) [I48 91]   Assessment & Plan    · Patient had 2 unsuccessful ablations and 2 cardioversions in the past  · Heart rate is uncontrolled ranging anywhere between   · Metoprolol dose was increased to 100 milligram p o  B i d   · Coumadin was held due to supratherapeutic INR  · Resume Coumadin if stress test is normal     Pedal edema   Assessment & Plan    · Patient states she was recently started on Lasix p r n  For pedal edema  · BNP mildly elevated at 1000 in the emergency department, chest x-ray clear, lungs clear, 2+ pedal edema bilaterally  · Patient has been taking half of her prescribed dose of Lasix for 2 days with no real improvement  · Patient had good diuresis with Lasix 40 milligram IV  · Continue Lasix 40 milligram p o   Daily  · Follow-up 2D echo     Essential hypertension   Assessment & Plan    · Continue lisinopril and metoprolol     RLS (restless legs syndrome)   Assessment & Plan    · Continue Mirapex     Peripheral neuropathy   Assessment & Plan    · Continue gabapentin     Vitamin D deficiency   Assessment & Plan    · Continue on vitamin-D     Pacemaker   Assessment & Plan    · Has regular pacemaker checks with her primary cardiologist         VTE Pharmacologic Prophylaxis:   Pharmacologic: Warfarin (Coumadin)  Mechanical VTE Prophylaxis in Place: Yes    Patient Centered Rounds: I have performed bedside rounds with nursing staff today  Discussions with Specialists or Other Care Team Provider: [facility]  Education and Discussions with Family / Patient:Yes  Time Spent for Care: 45 minutes  More than 50% of total time spent on counseling and coordination of care as described above  Current Length of Stay: 0 day(s)  Current Patient Status: Observation     Discharge Plan:  Pending    Code Status: Level 3 - DNAR and DNI      Subjective:   Patient has no further abdominal pain, chest pain  Patient does have mild retrosternal discomfort  Patient denies any nausea, vomiting or palpitations  Patient does have some leg edema      Objective:     Vitals:   Temp (24hrs), Av °F (36 7 °C), Min:97 °F (36 1 °C), Max:98 7 °F (37 1 °C)    Temp:  [97 °F (36 1 °C)-98 7 °F (37 1 °C)] 97 9 °F (36 6 °C)  HR:  [74-87] 80  Resp:  [18] 18  BP: ()/(60-89) 94/62  SpO2:  [92 %-96 %] 93 %  Body mass index is 39 85 kg/m²  Input and Output Summary (last 24 hours):   No intake or output data in the 24 hours ending 18 1608     Physical Exam:     Physical Exam   Constitutional: No distress  HENT:   Head: Normocephalic and atraumatic  Nose: Nose normal    Eyes: Pupils are equal, round, and reactive to light  Conjunctivae are normal    Neck: Normal range of motion  Neck supple  Cardiovascular: Normal rate  Murmur heard  Irregularly irregular   Pansystolic murmur   Pulmonary/Chest: Effort normal and breath sounds normal  No respiratory distress  She has no wheezes  She has no rales  Abdominal: Soft  Bowel sounds are normal  She exhibits no distension  There is no tenderness  There is no rebound and no guarding  Musculoskeletal: She exhibits edema     Bilateral +1 pedal edema   Neurological: She is alert  No cranial nerve deficit  Skin: Skin is warm and dry  No rash noted  Additional Data:     Labs:      Results from last 7 days  Lab Units 11/28/18  0607 11/27/18  1850   WBC Thousand/uL 10 99* 11 55*   HEMOGLOBIN g/dL 13 2 12 8   HEMATOCRIT % 41 6 40 5   PLATELETS Thousands/uL 204 192   NEUTROS PCT %  --  73       Results from last 7 days  Lab Units 11/28/18  0607 11/27/18  1850   SODIUM mmol/L 141 141   POTASSIUM mmol/L 4 1 4 4   CHLORIDE mmol/L 102 104   CO2 mmol/L 28 30   BUN mg/dL 33* 38*   CREATININE mg/dL 0 86 0 95   CALCIUM mg/dL 9 4 9 9   TOTAL BILIRUBIN mg/dL  --  0 60   ALK PHOS U/L  --  46   ALT U/L  --  42   AST U/L  --  19       Results from last 7 days  Lab Units 11/27/18  2253 11/26/18  1445   INR  2 80* 3 46*       Results from last 7 days  Lab Units 11/28/18  0129 11/27/18  2253 11/27/18  1850   TROPONIN I ng/mL <0 02 <0 02 <0 02     Lab Results   Component Value Date/Time    HGBA1C 6 1 08/30/2018 07:32 AM    HGBA1C 5 5 10/16/2014 11:48 AM               * I Have Reviewed All Lab Data Listed Above  * Additional Pertinent Lab Tests Reviewed: [facility]ed    Imaging:     XR chest 1 view portable   Final Result by Machelle Villanueva DO (11/27 2209)      Stable cardiomegaly              Workstation performed: [id number]           Imaging Reports Reviewed by myself    Cultures:   Blood Culture: No results found for: BLOODCX  Urine Culture: No results found for: URINECX  Sputum Culture: No components found for: SPUTUMCX  Wound Culture: No results found for: WOUNDCULT    Last 24 Hours Medication List:     Current Facility-Administered Medications:  aspirin 81 mg Oral Daily Marylouise Burow, CRNP   cholecalciferol 1,000 Units Oral Daily Marylouise Burow, CRNP   furosemide 40 mg Oral Daily Marylouise Burow, CRNP   gabapentin 800 mg Oral 4x Daily Marylouise Burow, CRNP   lisinopril 40 mg Oral Daily Marylouise Burow, CRNP   metoprolol tartrate 100 mg Oral BID RICKY Aviles   ondansetron 4 mg Intravenous Q6H PRN RICKY Martinez   pneumococcal 23-valent polysaccharide vaccine 0 5 mL Subcutaneous Prior to discharge Ophelia Johnson MD   pramipexole 0 5 mg Oral BID RICKY Martinez        Today, Patient Was Seen By: Ophelia Johnson MD    ** Please Note: Dragon 360 Dictation voice to text software may have been used in the creation of this document   **

## 2018-11-28 NOTE — UTILIZATION REVIEW
Continued Stay Review    PATIENT WAS OBSERVATION STATUS 11/27/18 CONVERTED TO INPATIENT 11/28/18 FOR CONTINUED EPIGASTRIC PAIN AND  WORKUP    Date: 11/28/18  Inpatient Admission (Order [id number])   Admission   Date: 11/28/2018 Department: [address] [place] [address] [address] Released By/Authorizing: Ana Noriega MD (auto-released)   Order Information     Order Name   INPATIENT ADMISSION [263]     The link below is only for use if the above order is AMB REFERRAL TO HOME HEALTH   Face to Face Certification Statement ([facility]   Order History   Inpatient   Date/Time Action Taken User Additional Information   11/28/18 1611 Release Ana Noriega MD (auto-released) From Order: [id number]   11/28/18 1611 Complete Ana Noriega MD    Order Details     Frequency Duration Priority Order Class   Once 1  occurrence Routine Hospital Performed   Order Info     Expected Discharge Date:   11/29/18   Inpatient Admission   Order: [id number]   Status:  Completed   Visible to patient:  No (Not Released) Next appt:  12/05/2018 at 01:45 PM in Podiatry [facility]                             Order Questions     Question Answer Comment   Admitting Physician SUZIE VELA    Level of Care Med Surg    Estimated length of stay More than 2 Midnights    Certification I certify that inpatient services are medically necessary for this patient for a duration of greater than two midnights  See H&P and MD Progress Notes for additional information about the patient's course of treatment          Vital Signs: BP 94/62 (BP Location: Right arm)   Pulse 80   Temp 97 9 °F (36 6 °C) (Oral)   Resp 18   Ht 5' 4" (1 626 m)   Wt 105 kg (232 lb 2 3 oz)   SpO2 93%   BMI 39 85 kg/m²     Medications:   Scheduled Meds:   Current Facility-Administered Medications:  aspirin 81 mg Oral Daily Pollie Peppers, CRNP   cholecalciferol 1,000 Units Oral Daily Pollie Peppers, CRNP   furosemide 40 mg Oral Daily Nancy Yolanda, RICKY   gabapentin 800 mg Oral 4x Daily Nancy Yolanda, RICKY   lisinopril 40 mg Oral Daily Nancy Yolanda, RICKY   metoprolol tartrate 100 mg Oral BID RICKY Powers   ondansetron 4 mg Intravenous Q6H PRN Nancy Yoalnda, RICKY   pneumococcal 23-valent polysaccharide vaccine 0 5 mL Subcutaneous Prior to discharge Rajeev Leal MD   pramipexole 0 5 mg Oral BID RICKY Pollock     Continuous Infusions:    PRN Meds: ondansetron    pneumococcal 23-valent polysaccharide vaccine    Abnormal Labs/Diagnostic Results:     Age/Sex: 78 y o  female     Assessment/Plan:   Epigastric abdominal pain   Assessment & Plan     · Atypical presentation for ACS  · Serial cardiac enzymes were negative  · Patient had EGD in later part of 2017 and was diagnosed with GERD and hiatal hernia  · Patient does report she has been noncompliant with her diet eating a lot of spicy foods tomatoes and onions  · Continue PPI  ·  last echo was July of 2017 which revealed a normal EF, elevated PA pressure, moderate TR; last stress test was 2014  · Patient underwent nuclear stress test today results of which are pending  · Follow-up 2D echo  ·  Resume Coumadin if stress test is normal             Discharge Plan:

## 2018-11-28 NOTE — H&P
H&P- Miracle Salazar 1940, 78 y o  female MRN: [id number]    Unit/Bed#: 708 [facility] Encounter: [id number]    Primary Care Provider: Nancy Patrick MD   Date and time admitted to hospital: 11/27/2018  5:50 PM        * Epigastric abdominal pain   Assessment & Plan    · Patient reports epigastric discomfort radiating up to the mid sternum since yesterday after eating steak, associated with burping and belching; the pain has been constant and it has not resolved despite taking Tums  · Patient does report she has been noncompliant with her diet eating a lot of spicy foods tomatoes and onions  · She stopped taking her ranitidine  · Patient does have a cardiac history so she is admitted to rule out ACS; last echo was July of 2017 which revealed a normal EF, elevated PA pressure, moderate TR; last stress test was 2014  · At this point will get serial EKGs and troponins  · Monitor on continuous telemetry  · Resume PPI  · Continue aspirin and statin  · Cardiology consult  · Echo and stress test       Pedal edema   Assessment & Plan    · Patient states she was recently started on Lasix p r n   For pedal edema  · BNP mildly elevated at 1000 in the emergency department, chest x-ray clear, lungs clear, 2+ pedal edema bilaterally  · Patient has been taking half of her prescribed dose of Lasix for 2 days with no real improvement  · Will give 1 time dose of Lasix IV x1 and then resume the 40 mg patient should be taking daily  · Patient does have a pretty significant murmur  · Will get a repeat echo     Supratherapeutic INR   Assessment & Plan    · INR elevated  · Hold Coumadin today and repeat in the morning  · Monitor for bleeding or bruising     Pacemaker   Assessment & Plan    · Has regular pacemaker checks with her primary cardiologist     Vitamin D deficiency   Assessment & Plan    · Continue on vitamin-D     Peripheral neuropathy   Assessment & Plan    · Continue gabapentin     Essential hypertension Assessment & Plan    · Continue lisinopril and metoprolol     RLS (restless legs syndrome)   Assessment & Plan    · Continue Mirapex     Atrial fibrillation (HCC) [I48 91]   Assessment & Plan    · Patient had 2 unsuccessful ablations and 2 cardioversions in the past  · Continue metoprolol  · INR supratherapeutic with no bleeding or bruising  · Hold Coumadin tonight and repeat in the morning         VTE Prophylaxis: Warfarin (Coumadin)  / sequential compression device   Code Status: Level 3 - DNAR and DNI    Anticipated Length of Stay:  Patient will be admitted on an Observation basis with an anticipated length of stay of  less than 2 midnights  Justification for Hospital Stay:  Chest pain, likely gastric however high-risk patient    Total Time for Visit, including Counseling / Coordination of Care: 20 minutes  Greater than 50% of this total time spent on direct patient counseling and coordination of care  Chief Complaint:   Heartburn (pt presents to the ed with indigestion x 3 days  pt states that it is constant  )      History of Present Illness:    Nadine Carranza is a 78 y o  female with a PMH of restless leg syndrome, Carrero's esophagitis, atrial fibrillation on Coumadin, permanent pacemaker, peripheral neuropathy, hypertension, hyperlipidemia who presents with epigastric pain radiating to the mid sternum  Patient states that over the past couple of days she has had tomatoes, onions, spicy food  She stopped her ranitidine a while ago because she did not think she needed any more  Yesterday after eating fried steak she noticed that she had epigastric discomfort which radiated up into the sternum  It was associated with belching  She took Tums with no relief  She does elicit some increased dyspnea on exertion  She became concerned that this might be cardiac prompting her to come to the emergency department today  Initial labs did not reveal any acute findings    Patient does have a significant heart murmur which she was unaware of  She takes Lasix p r n  For pedal edema and has been taking half the dose prescribed for the past 2 days as her legs have been swollen  BNP was elevated  Patient is admitted for further management  Review of Systems:    Review of Systems   Constitutional: Negative  HENT: Negative  Eyes: Negative  Respiratory: Positive for shortness of breath  Cardiovascular: Positive for chest pain and leg swelling  Gastrointestinal: Positive for abdominal pain  Endocrine: Negative  Genitourinary: Negative  Musculoskeletal: Negative  Skin: Negative  Allergic/Immunologic: Negative  Neurological: Negative  Hematological: Negative  Psychiatric/Behavioral: Negative          Past Medical and Surgical History:     Past Medical History:   Diagnosis Date    Atrial fibrillation ([facility] )     Carrero's esophagus     last assessed: 1/23/2018    Breast cancer ([facility] )     stage 1 (left), given adjuvant radiation with Arimidex x 5 years    Cancer [facility])     Left Breast, Lumpectomy    Cataract     last assessed: 3/11/2014    Dysplasia of toenail     last assessed: 8/29/2017    Esophageal reflux     GERD (gastroesophageal reflux disease)     Gross hematuria     last assessed: 2/19/2015    Hematuria     Hiatal hernia     Hypertension     Irregular heart beat     AFIB    Mixed sensory-motor polyneuropathy     Neuropathy     Obesity     Pacemaker     Paroxysmal atrial fibrillation (HCC)     Peripheral neuropathy     Rectal bleeding     Restless leg syndrome     Shortness of breath     last assessed: 1/11/2016       Past Surgical History:   Procedure Laterality Date    BREAST LUMPECTOMY Left     onset: 2006    BREAST SURGERY      CARDIAC PACEMAKER PLACEMENT      x 3 2006    CATARACT EXTRACTION      CYSTOSCOPY  04/04/2014    diagnostic    HYSTERECTOMY      ALISON BSO; due to fibroid uterus; age 38   Mavis Cousin KNEE CARTILAGE SURGERY      excision lesion of meniscus or capsule knee    KNEE SURGERY      OTHER SURGICAL HISTORY      radiation therapy    MI COLONOSCOPY FLX DX W/COLLJ SPEC WHEN PFRMD N/A 2/8/2017    Procedure: COLONOSCOPY;  Surgeon: Kong Ortiz MD;  [facility]; Service: Gastroenterology    MI ESOPHAGOGASTRODUODENOSCOPY TRANSORAL DIAGNOSTIC N/A 9/20/2017    Procedure: ESOPHAGOGASTRODUODENOSCOPY (EGD); Surgeon: Stevan Raygoza MD;  [facility]: [facility]; Service: Gastroenterology       Meds/Allergies:    Prior to Admission medications    Medication Sig Start Date End Date Taking?  Authorizing Provider   Calcium Acetate, Phos Binder, (CALCIUM ACETATE PO) Take by mouth daily     Yes Historical Provider, MD   Cholecalciferol (VITAMIN D3) 5000 units CAPS Take by mouth   Yes Historical Provider, MD   clobetasol (TEMOVATE) 0 05 % cream Apply topically once a week   12/1/18  Yes Historical Provider, MD   furosemide (LASIX) 40 mg tablet TAKE 1 TABLET(40 MG) BY MOUTH DAILY AS NEEDED FOR SWELLING  Patient taking differently: 20 mg oral daily 9/14/18  Yes José Miguel Carpenter MD   gabapentin (NEURONTIN) 800 mg tablet Take 1 tablet (800 mg total) by mouth 4 (four) times a day 10/17/18  Yes Sade Mancilla MD   lisinopril (ZESTRIL) 40 mg tablet TAKE 1 TABLET BY MOUTH  EVERY DAY AS DIRECTED 2/5/18  Yes Gabriele Kirby MD   metoprolol tartrate (LOPRESSOR) 50 mg tablet TAKE 1 TABLET BY MOUTH  TWICE A DAY 10/17/18  Yes Gabriele Kirby MD   multivitamin [facility]) TABS Take 1 tablet by mouth daily   Yes Historical Provider, MD   mupirocin (BACTROBAN) 2 % ointment Apply topically 3 (three) times a day prn 3/12/18  Yes José Miguel Carpenter MD   pramipexole (MIRAPEX) 0 5 mg tablet One and half tablet at 5:00 p m  and 10:00 p m  10/17/18  Yes Sade Mancilla MD   Probiotic Product (PROBIOTIC PO) Take by mouth   Yes Historical Provider, MD   warfarin (COUMADIN) 5 mg tablet TAKE 1 AND 1/2 TABLETS TO 2 TABLETS BY MOUTH DAILY OR  AS ORDERED BY PHYSICIAN 10/17/18  Yes Gabriele Kirby MD   albuterol (VENTOLIN HFA) 90 mcg/act inhaler Inhale 2 puffs every 6 (six) hours as needed for wheezing 6/19/18 11/27/18  Connee Rinne, MD   fluticasone [facility] HFA) 110 MCG/ACT inhaler Inhale 2 puffs 2 (two) times a day Rinse mouth after use  6/25/18 11/27/18  Connee Rinne, MD   ranitidine (ZANTAC) 150 mg tablet Take 1 tablet by mouth as needed   10/30/17 11/27/18  Historical Provider, MD       Allergies: Allergies   Allergen Reactions    Cephalexin Rash    Duloxetine Hcl Other (See Comments)     Facial pins and needles sensation    Erythromycin Rash    Levofloxacin Other (See Comments)     Muscular aches    Penicillins Rash    Savella [Milnacipran] Rash    Sulfa Antibiotics Rash       Social History:     Marital Status: /Civil Union   Substance Use History:   History   Alcohol Use    Yes     History   Smoking Status    Former Smoker    Years: 25 00    Quit date: 9/20/1980   Smokeless Tobacco    Never Used     Comment: Quit over 30 years ago; quit age 41     History   Drug Use No       Family History:    Family History   Problem Relation Age of Onset    Hypertension Mother     Heart disease Father     Aneurysm Father     Coronary artery disease Father         in his 76s with aneurysm    Aortic aneurysm Father         abdominal    Scleroderma Sister     Breast cancer Sister     Hypertension Sister     No Known Problems Son     Testicular cancer Son     Thyroid cancer Son     Colon cancer Maternal Aunt     Colon cancer Maternal Aunt        Physical Exam:     Vitals:   Blood Pressure: 146/89 (11/27/18 2137)  Pulse: 77 (11/27/18 2137)  Temperature: 98 °F (36 7 °C) (11/27/18 2137)  Temp Source: Oral (11/27/18 2137)  Respirations: 18 (11/27/18 2137)  Height: 5' 4" (162 6 cm) (11/27/18 2137)  Weight - Scale: 105 kg (232 lb 2 3 oz) (11/27/18 2137)  SpO2: 96 % (11/27/18 2137)    Physical Exam   Constitutional: She is oriented to person, place, and time   She appears well-developed and well-nourished  HENT:   Head: Normocephalic and atraumatic  Eyes: Pupils are equal, round, and reactive to light  Neck: Normal range of motion  Cardiovascular: Normal rate  An irregular rhythm present  Murmur heard  Systolic murmur is present with a grade of 4/6   Pulmonary/Chest: Breath sounds normal    Abdominal: Normal appearance  There is no tenderness  Musculoskeletal: Normal range of motion  1-2+ pedal edema bilaterally   Neurological: She is alert and oriented to person, place, and time  Skin: Skin is warm and dry  Psychiatric: She has a normal mood and affect  Nursing note and vitals reviewed  Additional Data:     Lab Results: I have personally reviewed pertinent reports  Results from last 7 days  Lab Units 11/27/18  1850   WBC Thousand/uL 11 55*   HEMOGLOBIN g/dL 12 8   HEMATOCRIT % 40 5   PLATELETS Thousands/uL 192   NEUTROS PCT % 73       Results from last 7 days  Lab Units 11/27/18  1850   SODIUM mmol/L 141   POTASSIUM mmol/L 4 4   CHLORIDE mmol/L 104   CO2 mmol/L 30   BUN mg/dL 38*   CREATININE mg/dL 0 95   CALCIUM mg/dL 9 9   TOTAL BILIRUBIN mg/dL 0 60   ALK PHOS U/L 46   ALT U/L 42   AST U/L 19       Results from last 7 days  Lab Units 11/26/18  1445   INR  3 46*       Results from last 7 days  Lab Units 11/27/18  1850   TROPONIN I ng/mL <0 02               Imaging: I have personally reviewed pertinent reports  XR chest 1 view portable   Final Result by Shirin Mcdaniels DO (11/27 2209)      Stable cardiomegaly  Workstation performed: [id number]             XR chest 1 view portable   Final Result      Stable cardiomegaly  Workstation performed: [id number]             EKG, Pathology, and Other Studies Reviewed on Admission:   · EKG: atrial fibrilaltion    Allscripts / Epic Records Reviewed: Yes     ** Please Note: This note has been constructed using a voice recognition system   **

## 2018-11-28 NOTE — ASSESSMENT & PLAN NOTE
· Patient states she was recently started on Lasix p r n   For pedal edema  · BNP mildly elevated at 1000 in the emergency department, chest x-ray clear, lungs clear, 2+ pedal edema bilaterally  · Patient has been taking half of her prescribed dose of Lasix for 2 days with no real improvement  · Will give 1 time dose of Lasix IV x1 and then resume the 40 mg patient should be taking daily  · Patient does have a pretty significant murmur  · Will get a repeat echo

## 2018-11-28 NOTE — ASSESSMENT & PLAN NOTE
· Patient had 2 unsuccessful ablations and 2 cardioversions in the past  · Heart rate is uncontrolled ranging anywhere between   · Metoprolol dose was increased to 100 milligram p o  B i d   · Coumadin was held due to supratherapeutic INR  · Resume Coumadin if stress test is normal

## 2018-11-28 NOTE — ASSESSMENT & PLAN NOTE
· Patient had 2 unsuccessful ablations and 2 cardioversions in the past  · Continue metoprolol  · INR supratherapeutic with no bleeding or bruising  · Hold Coumadin tonight and repeat in the morning

## 2018-11-29 VITALS
DIASTOLIC BLOOD PRESSURE: 65 MMHG | SYSTOLIC BLOOD PRESSURE: 155 MMHG | WEIGHT: 232.14 LBS | HEIGHT: 64 IN | TEMPERATURE: 98 F | HEART RATE: 98 BPM | RESPIRATION RATE: 18 BRPM | BODY MASS INDEX: 39.63 KG/M2 | OXYGEN SATURATION: 99 %

## 2018-11-29 LAB
ANION GAP SERPL CALCULATED.3IONS-SCNC: 9 MMOL/L (ref 4–13)
ATRIAL RATE: 214 BPM
ATRIAL RATE: 394 BPM
BUN SERPL-MCNC: 40 MG/DL (ref 5–25)
CALCIUM SERPL-MCNC: 9.6 MG/DL (ref 8.3–10.1)
CHLORIDE SERPL-SCNC: 100 MMOL/L (ref 100–108)
CO2 SERPL-SCNC: 29 MMOL/L (ref 21–32)
CREAT SERPL-MCNC: 0.96 MG/DL (ref 0.6–1.3)
ERYTHROCYTE [DISTWIDTH] IN BLOOD BY AUTOMATED COUNT: 13.5 % (ref 11.6–15.1)
GFR SERPL CREATININE-BSD FRML MDRD: 58 ML/MIN/1.73SQ M
GLUCOSE SERPL-MCNC: 117 MG/DL (ref 65–140)
HCT VFR BLD AUTO: 42.9 % (ref 34.8–46.1)
HGB BLD-MCNC: 13.5 G/DL (ref 11.5–15.4)
INR PPP: 2.42 (ref 0.86–1.16)
MCH RBC QN AUTO: 31.1 PG (ref 26.8–34.3)
MCHC RBC AUTO-ENTMCNC: 31.5 G/DL (ref 31.4–37.4)
MCV RBC AUTO: 99 FL (ref 82–98)
MRSA NOSE QL CULT: NORMAL
PLATELET # BLD AUTO: 201 THOUSANDS/UL (ref 149–390)
PMV BLD AUTO: 11.2 FL (ref 8.9–12.7)
POTASSIUM SERPL-SCNC: 4.1 MMOL/L (ref 3.5–5.3)
PROTHROMBIN TIME: 24.2 SECONDS (ref 9.4–11.7)
QRS AXIS: -4 DEGREES
QRS AXIS: 12 DEGREES
QRSD INTERVAL: 96 MS
QRSD INTERVAL: 98 MS
QT INTERVAL: 344 MS
QT INTERVAL: 398 MS
QTC INTERVAL: 435 MS
QTC INTERVAL: 441 MS
RBC # BLD AUTO: 4.34 MILLION/UL (ref 3.81–5.12)
SODIUM SERPL-SCNC: 138 MMOL/L (ref 136–145)
T WAVE AXIS: -18 DEGREES
T WAVE AXIS: -8 DEGREES
TROPONIN I SERPL-MCNC: <0.02 NG/ML
VENTRICULAR RATE: 72 BPM
VENTRICULAR RATE: 99 BPM
WBC # BLD AUTO: 11.54 THOUSAND/UL (ref 4.31–10.16)

## 2018-11-29 PROCEDURE — 93005 ELECTROCARDIOGRAM TRACING: CPT

## 2018-11-29 PROCEDURE — 80048 BASIC METABOLIC PNL TOTAL CA: CPT | Performed by: INTERNAL MEDICINE

## 2018-11-29 PROCEDURE — 93010 ELECTROCARDIOGRAM REPORT: CPT | Performed by: INTERNAL MEDICINE

## 2018-11-29 PROCEDURE — 99239 HOSP IP/OBS DSCHRG MGMT >30: CPT | Performed by: INTERNAL MEDICINE

## 2018-11-29 PROCEDURE — G0009 ADMIN PNEUMOCOCCAL VACCINE: HCPCS | Performed by: INTERNAL MEDICINE

## 2018-11-29 PROCEDURE — 85610 PROTHROMBIN TIME: CPT | Performed by: NURSE PRACTITIONER

## 2018-11-29 PROCEDURE — 84484 ASSAY OF TROPONIN QUANT: CPT | Performed by: NURSE PRACTITIONER

## 2018-11-29 PROCEDURE — 90732 PPSV23 VACC 2 YRS+ SUBQ/IM: CPT | Performed by: INTERNAL MEDICINE

## 2018-11-29 PROCEDURE — 85027 COMPLETE CBC AUTOMATED: CPT | Performed by: INTERNAL MEDICINE

## 2018-11-29 RX ORDER — METOPROLOL TARTRATE 75 MG/1
75 TABLET, FILM COATED ORAL 2 TIMES DAILY
Qty: 60 TABLET | Refills: 0 | Status: SHIPPED | OUTPATIENT
Start: 2018-11-29 | End: 2019-04-17 | Stop reason: SDUPTHER

## 2018-11-29 RX ORDER — PANTOPRAZOLE SODIUM 40 MG/1
40 TABLET, DELAYED RELEASE ORAL DAILY
Qty: 30 TABLET | Refills: 0 | Status: SHIPPED | OUTPATIENT
Start: 2018-11-29 | End: 2019-02-21 | Stop reason: ALTCHOICE

## 2018-11-29 RX ORDER — METOPROLOL TARTRATE 75 MG/1
75 TABLET, FILM COATED ORAL 2 TIMES DAILY
Qty: 60 TABLET | Refills: 0 | Status: SHIPPED | OUTPATIENT
Start: 2018-11-29 | End: 2018-11-29

## 2018-11-29 RX ADMIN — VITAMIN D, TAB 1000IU (100/BT) 1000 UNITS: 25 TAB at 10:18

## 2018-11-29 RX ADMIN — GABAPENTIN 800 MG: 400 CAPSULE ORAL at 10:18

## 2018-11-29 RX ADMIN — GABAPENTIN 800 MG: 400 CAPSULE ORAL at 13:19

## 2018-11-29 RX ADMIN — METOPROLOL TARTRATE 75 MG: 50 TABLET, FILM COATED ORAL at 10:58

## 2018-11-29 RX ADMIN — PNEUMOCOCCAL VACCINE POLYVALENT 0.5 ML
25; 25; 25; 25; 25; 25; 25; 25; 25; 25; 25; 25; 25; 25; 25; 25; 25; 25; 25; 25; 25; 25; 25 INJECTION, SOLUTION INTRAMUSCULAR; SUBCUTANEOUS at 06:06

## 2018-11-29 RX ADMIN — LISINOPRIL 40 MG: 20 TABLET ORAL at 10:21

## 2018-11-29 RX ADMIN — ASPIRIN 81 MG 81 MG: 81 TABLET ORAL at 10:19

## 2018-11-29 NOTE — NURSING NOTE
Discharge instructions discussed with patient, all questions answered  Patient left 2 S stable, all belongings at side

## 2018-11-29 NOTE — ASSESSMENT & PLAN NOTE
· Patient states she was recently started on Lasix p r n   For pedal edema  · BNP mildly elevated at 1000 in the emergency department, chest x-ray clear, lungs clear, 2+ pedal edema bilaterally  · Patient advised to continue Lasix every day there is leg swelling  · Echo showed EF of 50 for 60% without any regional wall motion abnormalities

## 2018-11-29 NOTE — ASSESSMENT & PLAN NOTE
· Continue lisinopril and metoprolol  · Blood pressure is running low  · Will decrease metoprolol to 75 milligram p o  B i d

## 2018-11-29 NOTE — PHYSICIAN ADVISOR
Current patient class: Inpatient  The patient is currently on Hospital Day: 3 at [address] [place], [place] [address]y      The patient was admitted to the hospital at [phone] on 11/28/18 for the following diagnosis:  Heartburn [R12]  Chest pain [R07 9]       There is documentation in the medical record of an expected length of stay of at least 2 midnights  The patient is therefore expected to satisfy the 2 midnight benchmark and given the 2 midnight presumption is appropriate for INPATIENT ADMISSION  Given this expectation of a satisfying stay, CMS instructs us that the patient is most often appropriate for inpatient admission under part A provided medical necessity is documented in the chart  After review of the relevant documentation, labs, vital signs and test results, the patient is appropriate for INPATIENT ADMISSION  Admission to the hospital as an inpatient is a complex decision making process which requires the practitioner to consider the patients presenting complaint, history and physical examination and all relevant testing  With this in mind, in this case, the patient was deemed appropriate for INPATIENT ADMISSION  After review of the documentation and testing available at the time of the admission I concur with this clinical determination of medical necessity  Rationale is as follows: The patient is a 78 yrs old Female who presented to the ED at 11/27/2018  5:50 PM with a chief complaint of Heartburn (pt presents to the ed with indigestion x 3 days  pt states that it is constant  )     Given the need for further hospitalization, and along with the documentation of medical necessity present in the chart, the patient is appropriate for inpatient admission  The patient is expected to satisfy the 2 midnight benchmark, and will require further acute medical care  The patient does have comorbid conditions which increases the risk for significant adverse outcome   Given this the patient is appropriate for inpatient admission  The patients vitals on arrival were ED Triage Vitals   Temperature Pulse Respirations Blood Pressure SpO2   11/27/18 1746 11/27/18 1746 11/27/18 1746 11/27/18 1746 11/27/18 1746   98 7 °F (37 1 °C) 83 18 111/79 96 %      Temp Source Heart Rate Source Patient Position - Orthostatic VS BP Location FiO2 (%)   11/27/18 1746 11/27/18 1746 11/27/18 2137 11/27/18 2137 --   Tympanic Monitor Lying Right arm       Pain Score       11/27/18 2107       2           Past Medical History:   Diagnosis Date    Atrial fibrillation [facility] )     Carrero's esophagus     last assessed: 1/23/2018    Breast cancer ([facility] )     stage 1 (left), given adjuvant radiation with Arimidex x 5 years    Cancer [facility])     Left Breast, Lumpectomy    Cataract     last assessed: 3/11/2014    Dysplasia of toenail     last assessed: 8/29/2017    Esophageal reflux     GERD (gastroesophageal reflux disease)     Gross hematuria     last assessed: 2/19/2015    Hematuria     Hiatal hernia     Hypertension     Irregular heart beat     AFIB    Mixed sensory-motor polyneuropathy     Neuropathy     Obesity     Pacemaker     Paroxysmal atrial fibrillation (HCC)     Peripheral neuropathy     Rectal bleeding     Restless leg syndrome     Shortness of breath     last assessed: 1/11/2016     Past Surgical History:   Procedure Laterality Date    BREAST LUMPECTOMY Left     onset: 2006    BREAST SURGERY      CARDIAC PACEMAKER PLACEMENT      x 3 2006    CATARACT EXTRACTION      CYSTOSCOPY  04/04/2014    diagnostic    HYSTERECTOMY      ALISON BSO; due to fibroid uterus; age 38   Akanksha Diones KNEE CARTILAGE SURGERY      excision lesion of meniscus or capsule knee    KNEE SURGERY      OTHER SURGICAL HISTORY      radiation therapy    RI COLONOSCOPY FLX DX W/COLLJ SPEC WHEN PFRMD N/A 2/8/2017    Procedure: COLONOSCOPY;  Surgeon: Maria Victoria Rob MD;  [facility];   Service: Gastroenterology    RI ESOPHAGOGASTRODUODENOSCOPY TRANSORAL DIAGNOSTIC N/A 9/20/2017    Procedure: ESOPHAGOGASTRODUODENOSCOPY (EGD); Surgeon: John Pradhan MD;  Location:  GI LAB;   Service: Gastroenterology           Consults have been placed to:   IP CONSULT TO CARDIOLOGY    Vitals:    11/28/18 0448 11/28/18 0955 11/28/18 1406 11/28/18 2133   BP: 165/88 119/85 94/62 112/75   BP Location: Right arm Right arm Right arm Right arm   Pulse: 83 87 80 89   Resp: 18 18 18 18   Temp: (!) 97 °F (36 1 °C) 98 6 °F (37 °C) 97 9 °F (36 6 °C) 98 3 °F (36 8 °C)   TempSrc: Tympanic Tympanic Oral Oral   SpO2: 93% 95% 93% 95%   Weight:       Height:           Most recent labs:    Recent Labs      11/27/18   1850  11/27/18   2253  11/28/18   0129  11/28/18   0607   WBC  11 55*   --    --   10 99*   HGB  12 8   --    --   13 2   HCT  40 5   --    --   41 6   PLT  192   --    --   204   K  4 4   --    --   4 1   CALCIUM  9 9   --    --   9 4   BUN  38*   --    --   33*   CREATININE  0 95   --    --   0 86   LIPASE  139   --    --    --    INR   --   2 80*   --    --    TROPONINI  <0 02  <0 02  <0 02   --    AST  19   --    --    --    ALT  42   --    --    --    ALKPHOS  46   --    --    --        Scheduled Meds:  Current Facility-Administered Medications:  aspirin 81 mg Oral Daily Mary Rendon, RICKY   cholecalciferol 1,000 Units Oral Daily Mary [employer], RICKY   furosemide 40 mg Oral Daily Mary Rendon, RICKY   gabapentin 800 mg Oral 4x Daily Mary Rendon, RICKY   lisinopril 40 mg Oral Daily Mary Rendon, RICKY   metoprolol tartrate 100 mg Oral BID RICKY Villalobos   ondansetron 4 mg Intravenous Q6H PRN RICKY Rosario   pneumococcal 23-valent polysaccharide vaccine 0 5 mL Subcutaneous Prior to discharge Radha Haley MD   pramipexole 0 5 mg Oral BID Mary Police, CRNP     Continuous Infusions:   PRN Meds: ondansetron    pneumococcal 23-valent polysaccharide vaccine    Surgical procedures (if appropriate):

## 2018-11-29 NOTE — ASSESSMENT & PLAN NOTE
· Atypical presentation for ACS   · Likely secondary to GERD/gastritis  · Serial cardiac enzymes were negative  · Patient had EGD in later part of 2017 and was diagnosed with GERD and hiatal hernia  · Patient does report she has been noncompliant with her diet eating a lot of spicy foods tomatoes and onions  · Continue PPI  ·  last echo was July of 2017 which revealed a normal EF, elevated PA pressure, moderate TR; last stress test was 2014  · Patient's nuclear stress test was normal   · Echo showed EF of 60% without any regional wall motion abnormalities  · Follow up outpatient with GI

## 2018-11-29 NOTE — ASSESSMENT & PLAN NOTE
· Patient had 2 unsuccessful ablations and 2 cardioversions in the past  · Heart rate is uncontrolled ranging anywhere between   · Patient is having low blood pressure with increased dose of metoprolol to 100 milligram p o  B i d   · Metoprolol dose decreased to 75 milligram p o  B i d   · Continue anticoagulation with Coumadin

## 2018-11-30 ENCOUNTER — TRANSITIONAL CARE MANAGEMENT (OUTPATIENT)
Dept: INTERNAL MEDICINE CLINIC | Facility: CLINIC | Age: 78
End: 2018-11-30

## 2018-12-05 ENCOUNTER — TRANSCRIBE ORDERS (OUTPATIENT)
Dept: ADMINISTRATIVE | Facility: HOSPITAL | Age: 78
End: 2018-12-05

## 2018-12-05 ENCOUNTER — ANTICOAG VISIT (OUTPATIENT)
Dept: CARDIOLOGY CLINIC | Facility: CLINIC | Age: 78
End: 2018-12-05

## 2018-12-05 ENCOUNTER — OFFICE VISIT (OUTPATIENT)
Dept: PODIATRY | Facility: CLINIC | Age: 78
End: 2018-12-05
Payer: MEDICARE

## 2018-12-05 ENCOUNTER — APPOINTMENT (OUTPATIENT)
Dept: LAB | Facility: HOSPITAL | Age: 78
End: 2018-12-05
Payer: MEDICARE

## 2018-12-05 VITALS
RESPIRATION RATE: 17 BRPM | WEIGHT: 232 LBS | HEIGHT: 64 IN | SYSTOLIC BLOOD PRESSURE: 116 MMHG | BODY MASS INDEX: 39.61 KG/M2 | DIASTOLIC BLOOD PRESSURE: 79 MMHG | HEART RATE: 89 BPM

## 2018-12-05 DIAGNOSIS — I48.20 CHRONIC ATRIAL FIBRILLATION (HCC): ICD-10-CM

## 2018-12-05 DIAGNOSIS — L84 CORNS: ICD-10-CM

## 2018-12-05 DIAGNOSIS — M79.671 PAIN IN BOTH FEET: Primary | ICD-10-CM

## 2018-12-05 DIAGNOSIS — E11.42 DIABETIC POLYNEUROPATHY ASSOCIATED WITH TYPE 2 DIABETES MELLITUS (HCC): ICD-10-CM

## 2018-12-05 DIAGNOSIS — I48.91 ATRIAL FIBRILLATION, UNSPECIFIED TYPE (HCC): ICD-10-CM

## 2018-12-05 DIAGNOSIS — I70.209 PERIPHERAL ARTERIOSCLEROSIS (HCC): ICD-10-CM

## 2018-12-05 DIAGNOSIS — M79.672 PAIN IN BOTH FEET: Primary | ICD-10-CM

## 2018-12-05 LAB
INR PPP: 2.15 (ref 0.86–1.16)
PROTHROMBIN TIME: 21.6 SECONDS (ref 9.4–11.7)

## 2018-12-05 PROCEDURE — 85610 PROTHROMBIN TIME: CPT

## 2018-12-05 PROCEDURE — 11056 PARNG/CUTG B9 HYPRKR LES 2-4: CPT | Performed by: PODIATRIST

## 2018-12-05 PROCEDURE — 36415 COLL VENOUS BLD VENIPUNCTURE: CPT

## 2018-12-05 NOTE — PROGRESS NOTES
Procedures   Foot Exam     Assessment/Plan:  Painful calluses   Diabetic neuropathy   Peripheral vascular disease      Plan   Diabetic foot exam performed   All mycotic nails debrided   Plantar calluses debrided without pain or complication      Diagnoses and all orders for this visit:     Diabetic polyneuropathy associated with type 2 diabetes mellitus ([facility] )     Corns     Pain in both feet     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[facility] [address] )       Discussion/Summary  The patient was counseled regarding instructions for management,-- prognosis,-- patient and family education,-- risks and benefits of treatment options,-- importance of compliance with treatment  Patient is able to Self-Care  Possible side effects of new medications were reviewed with the patient/guardian today  The treatment plan was reviewed with the patient/guardian  The patient/guardian understands and agrees with the treatment plan      Chief Complaint  Routine nail care      History of Present Illness  HPI: Patient is doing well with Cymbalta however she began have facial tingling  She discontinued medicine   However the medication was relieving her neuropathic pain       Review of Systems     ROS reviewed       Active Problems     1  Achilles tendinitis of left lower extremity (726 71) (M76 62)   2  Acquired ankle/foot deformity (736 70) (M21 969)   3  AF (paroxysmal atrial fibrillation) (427 31) (I48 0)   4  Anticoagulant long-term use (V58 61) (Z79 01)   5  Arthritis (716 90) (M19 90)   6  At risk for bone density loss (V49 89) (Z91 89)   7  Atrial fibrillation (427 31) (I48 91)   8  History of Breast Cancer (V10 3)   9  Difficulty walking (719 7) (R26 2)   10  Encounter for screening mammogram for breast cancer (V76 12) (Z12 31)   11  Esophageal reflux (530 81) (K21 9)   12  Foot pain, bilateral (729 5) (M79 671,M79 672)   13  Hiatal hernia (553 3) (K44 9)   14  History of Carrero's esophagus (V12 79) (Z87 19)   15  History of fall (V15 88) (Z91 81)   16  Hypertension (401 9) (I10)   17  Leg pain, left (729 5) (S04 483)   18  Lichen sclerosus et atrophicus (701 0) (L90 0)   19  Lumbar radiculopathy (724 4) (M54 16)   20  Multiple lung nodules (793 19) (R91 8)   21  Obesity (278 00) (E66 9)   22  Onychomycosis (110 1) (B35 1)   23  Osteopenia (733 90) (M85 80)   24  Pacemaker (V45 01) (Z95 0)   25  Peripheral neuropathy (356 9) (G62 9)   26  Pes planus of both feet (734) (M21 41,M21 42)   27  Plantar fasciitis of left foot (728 71) (M72 2)   28  Restless legs syndrome (333 94) (G25 81)     Past Medical History   · History of Abnormal glucose (790 29) (R73 09)   · Atrial fibrillation (427 31) (I48 91)   · History of Breast Cancer (V10 3)   · 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)   · Esophageal reflux (530 81) (K21 9)   · Denied: History of Exposure To STD   · History of Gross hematuria (599 71) (R31 0)   · History of Hematoma (924 9) (T14 8XXA)   · History of Hematoma of lower extremity, right, initial encounter (924 5) (S80 11XA)   · History of backache (V13 59) (Z87 39)   · History of Carrero's esophagus (V12 79) (Z87 19)   · History of cataract (V12 49) (Z86 69)   · History of chest pain (V13 89) (Z87 898)   · History of fall (V15 88) (Z91 81)   · History of hematuria (V13 09) (Z87 448)   · History of postmenopausal hormone replacement therapy (V87 49) (Z92 29)   · History of shortness of breath (V13 89) (X38 403)   · History of urinary incontinence (V13 09) (Z87 898)   · History of Neck pain (723 1) (M54 2)   · Normal delivery (650) (O80,Z37  9)   · History of Right knee pain (719 46) (M25 561)   · History of Ringing In The Ears (Tinnitus) (388 30)   · History of Skin rash (782 1) (R21)   · History of Traumatic blister of right lower extremity, initial encounter (916 2) (Z42 289P)   · History of UTI (lower urinary tract infection) (599 0) (N39 0)     The active problems and past medical history were reviewed and updated today       Surgical History   · Denied: History of Abnormal Pap Smear Of Cervix   · History of Breast Surgery Lumpectomy   · History of Cataract Surgery   · History of Diagnostic Cystoscopy   · History of Excision Lesion Of Meniscus Or Capsule Knee   · History of Pacemaker - Pulse Generator Replacement   · History of Pacemaker Placement   · History of Pacemaker Placement   · History of Radiation Therapy   · History of Total Abdominal Hysterectomy With Removal Of Both Ovaries     The surgical history was reviewed and updated today        Family History  Mother    · Denied: Family history of Alcoholism and drug addiction in family   · Denied: Family history of Anxiety and depression  Father    · Denied: Family history of Alcoholism and drug addiction in family   · Denied: Family history of Anxiety and depression   · Family history of Coronary Artery Disease (V17 49)   · Family history of abdominal aortic aneurysm (V17 49) (Z82 49)  Child    · Denied: Family history of Alcoholism and drug addiction in family   · Denied: Family history of Anxiety and depression  Sibling    · Denied: Family history of Alcoholism and drug addiction in family   · Denied: Family history of Anxiety and depression  Sister    · Family history of Breast Cancer (V16 3)  Maternal Aunt    · Family history of Colon Cancer (V16 0)   · Family history of Colon Cancer (V16 0)  Family History    · Denied: Family history of Diabetes Mellitus   · Denied: Family history of Stroke Syndrome     The family history was reviewed and updated today        Social History      · Being A Social Drinker   · Denied: History of Drug Use   · Former smoker (V15 82) (L06 575)   · 25 pack years, quit age 41   · Marital History - Currently    · Retired From Work   · 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  The social history was reviewed and updated today       Current Meds   1  Clobetasol Propionate 0 05 % External Ointment; Apply to affected area twice weekly;  Therapy: 48ROB6069 to (Last Rx:25Oct2017)  Requested for: 25Oct2017 Ordered   2  Gabapentin 800 MG Oral Tablet; TAKE 1 TABLET 4 TIMES DAILY; Therapy: 26ZCQ2540 to Recorded   3  Lisinopril 40 MG Oral Tablet; TAKE ONE TABLET BY MOUTH EVERY DAY AS DIRECTED; Therapy: 60RPZ5087 to (Evaluate:22Mar2018)  Requested for: 27Mar2017; Last Rx:27Mar2017 Ordered   4  Metoprolol Tartrate 50 MG Oral Tablet; TAKE 1 TABLET TWICE DAILY  Requested for: 72QNT0520; Last Rx:27Mar2017 Ordered   5  Multivitamins CAPS; TAKE 1 CAPSULE DAILY; Therapy: (Recorded:11Mar2014) to Recorded   6  Pramipexole Dihydrochloride 0 5 MG Oral Tablet; TAKE 1 5 TABLETS Bedtime; Therapy: 04LAQ7872 to (Last Rx:26Mml0723) Ordered   7  Probiotic Oral Packet; Therapy: 25Oct2017 to Recorded   8  RaNITidine HCl - 150 MG Oral Tablet; TAKE 1 TABLET AT BEDTIME; Therapy: 38XBT1279 to 0389 [id number])  Requested for: 58JFG1846; Last Rx:30Oct2017 Ordered   9  Savella 50 MG Oral Tablet; TAKE ONE TAB TWICE DAILY; Therapy: 94VYA6125 to (Last Rx:20Nov2017)  Requested for: 20Nov2017 Ordered   10  Savella 50 MG Oral Tablet;  Therapy: 31RPO1651 to Recorded   11  Savella Titration Pack 12 5 & 25 & 50 MG Oral Miscellaneous; as directed; Eva Glory: 68CJI9665 to (Evaluate:10Oct2017)  Requested for: 26Sep2017; Last  Rx:26Sep2017 Ordered   12  Warfarin Sodium 5 MG Oral Tablet; take 1 to 1 1/2 tabs by mouth daily or sa directed; Sperry Glory: 03BWP8679 to (Evaluate:23Mar2018)  Requested for: 09NWT5247; Last  Rx:28Mar2017 Ordered     The medication list was reviewed and updated today        Allergies  1  duloxetine   2  LevoFLOXacin TABS   3  Cephalexin CAPS   4  Erythromycin Base TABS   5  Keflex TABS   6  Penicillins   7  Sulfa Drugs     Vitals        Heart Rate 78   Respiration 16   Systolic 199   Diastolic 81   Height 5 ft 4 in   Weight 233 lb    BMI Calculated 39 99   BSA Calculated 2 09      Physical Exam  Left Foot: Appearance: Normal except as noted: excessive pronation-- and-- pes planus   Tenderness: None except the lisfranc joint-- and-- medial longitudinal arch  ROM: subtalar motion was restricted  Right Foot: Appearance: Normal except as noted: excessive pronation-- and-- pes planus  Tenderness: None except the lisfranc joint-- and-- medial longitudinal arch  ROM: subtalar motion was restricted   Left Ankle: ROM: limited ROM in all planes   Right Ankle: ROM: limited ROM in all planes   Neurological Exam: performed  Light touch was decreased bilaterally  Vibratory sensation was decreased in both first metatarsophalangeal joints  Deep tendon reflexes: achilles reflex absent bilateraly-- and-- 4/5 L5 testing bilateral    Vascular Exam: performed Dorsalis pedis pulses were diminished bilaterally  Posterior tibial pulses were diminished bilaterally  Dependence rubor was present bilaterally  Capillary refill time was Negative digital hair noted, but-- between 1-3 seconds bilaterally  Toenails: All of the toenails were elongated,-- hypertrophied,-- discolored-- and--   Hyperkeratosis: present on both first toes  Shoe Gear Evaluation: performed ()  Recommendation(s): SAS style       Procedure  Nails debrided  Bilateral preulcerative lesions debrided as well  Procedure without pain or complication  Refill of Savella ordered          The following portions of the patient's history were reviewed and updated as appropriate: allergies, current medications, past family history, past medical history, past social history, past surgical history and problem list      Review of Systems       Objective:      Foot Exam     Right Foot/Ankle      Inspection and Palpation  Skin Exam: dry skin and skin intact;      Neurovascular  Dorsalis pedis: 1+  Posterior tibial: 1+        Left Foot/Ankle       Inspection and Palpation  Skin Exam: dry skin and skin intact;      Neurovascular  Dorsalis pedis: 1+  Posterior tibial: 1+        Physical Exam   Cardiovascular: Pulses are weak pulses     Pulses:       Dorsalis pedis pulses are 1+ on the right side, and 1+ on the left side         Posterior tibial pulses are 1+ on the right side, and 1+ on the left side  Feet:   Right Foot:   Skin Integrity: Positive for dry skin  Left Foot:   Skin Integrity: Positive for dry skin  Patient's shoes and socks removed  Right Foot/Ankle   Right Foot Inspection  Skin Exam: skin intact and dry skin               Toe Exam: swelling  Sensory   Vibration: absent  Proprioception: absent   Monofilament testing: absent  Vascular     The right DP pulse is 1+  The right PT pulse is 1+       Left Foot/Ankle  Left Foot Inspection  Skin Exam: skin intact and dry skin              Toe Exam: swelling                   Sensory   Vibration: absent  Proprioception: absent  Monofilament: absent  Vascular     The left DP pulse is 1+  The left PT pulse is 1+  Assign Risk Category:  Deformity present; Loss of protective sensation; Weak pulses       Risk: 2           Subjective:  patient has pain in her feet with ambulation   No history of trauma      Patient ID: Natividad Ortiz is a 78 y  o  female      HPI     The following portions of the patient's history were reviewed and updated as appropriate: allergies, current medications, past family history, past medical history, past social history, past surgical history and problem list      Review of Systems       Objective:      Foot Exam     Right Foot/Ankle      Inspection and Palpation  Skin Exam: callus;      Neurovascular  Dorsalis pedis: 1+  Posterior tibial: 1+        Left Foot/Ankle       Inspection and Palpation  Skin Exam: callus;      Neurovascular  Dorsalis pedis: 1+  Posterior tibial: 1+        Physical Exam   Cardiovascular: Pulses are weak pulses  Pulses:       Dorsalis pedis pulses are 1+ on the right side, and 1+ on the left side         Posterior tibial pulses are 1+ on the right side, and 1+ on the left side  Feet:   Right Foot:   Skin Integrity: Positive for callus     Left Foot:   Skin Integrity: Positive for callus  Patient's shoes and socks removed  Right Foot/Ankle   Right Foot Inspection  Skin Exam: callus and callus               Toe Exam: swelling and erythema  Sensory   Vibration: diminished  Proprioception: diminished   Monofilament testing: diminished  Vascular  Capillary refills: elevated  The right DP pulse is 1+  The right PT pulse is 1+       Left Foot/Ankle  Left Foot Inspection  Skin Exam: callus              Toe Exam: swelling                   Sensory   Vibration: diminished  Proprioception: diminished  Monofilament: diminished  Vascular  Capillary refills: elevated  The left DP pulse is 1+  The left PT pulse is 1+  Assign Risk Category:  Deformity present;  Loss of protective sensation; Weak pulses       Risk: 2

## 2018-12-07 ENCOUNTER — OFFICE VISIT (OUTPATIENT)
Dept: OBGYN CLINIC | Facility: CLINIC | Age: 78
End: 2018-12-07
Payer: MEDICARE

## 2018-12-07 VITALS — HEIGHT: 64 IN | WEIGHT: 232 LBS | BODY MASS INDEX: 39.61 KG/M2

## 2018-12-07 DIAGNOSIS — E11.42 DIABETIC POLYNEUROPATHY ASSOCIATED WITH TYPE 2 DIABETES MELLITUS (HCC): ICD-10-CM

## 2018-12-07 DIAGNOSIS — G89.29 CHRONIC PAIN OF RIGHT KNEE: ICD-10-CM

## 2018-12-07 DIAGNOSIS — M17.0 PRIMARY OSTEOARTHRITIS OF BOTH KNEES: Primary | ICD-10-CM

## 2018-12-07 DIAGNOSIS — M25.561 CHRONIC PAIN OF RIGHT KNEE: ICD-10-CM

## 2018-12-07 PROCEDURE — 20610 DRAIN/INJ JOINT/BURSA W/O US: CPT | Performed by: ORTHOPAEDIC SURGERY

## 2018-12-07 PROCEDURE — 99213 OFFICE O/P EST LOW 20 MIN: CPT | Performed by: ORTHOPAEDIC SURGERY

## 2018-12-07 RX ORDER — TRIAMCINOLONE ACETONIDE 40 MG/ML
40 INJECTION, SUSPENSION INTRA-ARTICULAR; INTRAMUSCULAR
Status: COMPLETED | OUTPATIENT
Start: 2018-12-07 | End: 2018-12-07

## 2018-12-07 RX ORDER — BUPIVACAINE HYDROCHLORIDE 5 MG/ML
6 INJECTION, SOLUTION EPIDURAL; INTRACAUDAL
Status: COMPLETED | OUTPATIENT
Start: 2018-12-07 | End: 2018-12-07

## 2018-12-07 RX ADMIN — TRIAMCINOLONE ACETONIDE 40 MG: 40 INJECTION, SUSPENSION INTRA-ARTICULAR; INTRAMUSCULAR at 11:30

## 2018-12-07 RX ADMIN — BUPIVACAINE HYDROCHLORIDE 6 ML: 5 INJECTION, SOLUTION EPIDURAL; INTRACAUDAL at 11:30

## 2018-12-07 NOTE — PROGRESS NOTES
Assessment/Plan:  1  Primary osteoarthritis of both knees  Large joint arthrocentesis   2  Chronic pain of right knee  Large joint arthrocentesis   3  Diabetic polyneuropathy associated with type 2 diabetes mellitus ([facility] )     4  BMI 39 0-39 9,adult       Channing Wilks is a pleasant 82-year-old female with activity related knee pain due to her bilateral knee osteoarthritis  She is presenting today for a planned staggered injection for her right knee osteoarthritis  She consented to and underwent the injection as detailed below without difficulty or complication  We have applauded her weight loss efforts encouraged her to continue with her lifestyle changes  Due to her underlying diabetes and other comorbidities, we would ideally like to see her at a BMI of 36 before being able to offer her a knee replacement  Due to her life circumstances, she is unable to consider a total knee at this point anyway  We will plan to see her back in 3 months for clinical re-evaluation and repeat staggered injections if needed  All of her questions were addressed today  Large joint arthrocentesis  Date/Time: 12/7/2018 11:30 AM  Consent given by: patient  Site marked: site marked  Timeout: Immediately prior to procedure a time out was called to verify the correct patient, procedure, equipment, support staff and site/side marked as required   Supporting Documentation  Indications: pain   Procedure Details  Location: knee - R knee  Preparation: Patient was prepped and draped in the usual sterile fashion  Needle size: 20 G  Ultrasound guidance: no  Approach: anterolateral  Medications administered: 6 mL bupivacaine (PF) 0 5 %; 40 mg triamcinolone acetonide 40 mg/mL    Patient tolerance: patient tolerated the procedure well with no immediate complications  Dressing:  Sterile dressing applied      After the risks and benefits of the right knee injection were explained, and verbal consent was obtained for the injection    The right knee was prepped using aseptic technique using isopropyl alcohol and betadine solution  The right knee was successfully injected with 6cc of 0 5% marcaine without epinephrine and 2cc of Kenalog 40 suspension using a 20 gauge needle  After the injection, hemostasis was achieved, and a dressing was applied  Post-injection icing and activity modification instructions were provided  The patient tolerated the procedure well  Subjective: Bilateral knee follow up    Patient ID: Franklin Harmon is a 78 y o  female  Zulema Houser is a pleasant 78year old female presenting today for follow up of his bilateral knee pain and osteoarthritis  She underwent a left knee cortisone injection last month, and reports getting significant relief  She does not check her blood sugars at home and did not notice any symptoms of hyperglycemia  She was briefly admitted for epigastritis two weeks ago  Her right knee pain has persisted with activity  She denies any new injuries  She has been making a concerted effort for weight loss  Review of Systems   Constitutional: Negative  HENT: Negative  Eyes: Negative  Respiratory: Negative  Cardiovascular: Negative  Gastrointestinal: Negative  Endocrine: Negative  Genitourinary: Negative  Musculoskeletal: Positive for arthralgias  Skin: Negative  Allergic/Immunologic: Negative  Neurological: Negative  Hematological: Negative  Psychiatric/Behavioral: Negative            Past Medical History:   Diagnosis Date    [facility]     Carrero's esophagus     last assessed: 1/23/2018    Breast cancer ([facility] )     stage 1 (left), given adjuvant radiation with Arimidex x 5 years    Cancer [facility])     Left Breast, Lumpectomy    Cataract     last assessed: 3/11/2014    Dysplasia of toenail     last assessed: 8/29/2017    Esophageal reflux     GERD (gastroesophageal reflux disease)     Gross hematuria     last assessed: 2/19/2015    Hematuria     Hiatal hernia     Hypertension     Irregular heart beat     AFIB    Mixed sensory-motor polyneuropathy     Neuropathy     Obesity     Pacemaker     Paroxysmal atrial fibrillation (HCC)     Peripheral neuropathy     Rectal bleeding     Restless leg syndrome     Shortness of breath     last assessed: 1/11/2016       Past Surgical History:   Procedure Laterality Date    BREAST LUMPECTOMY Left     onset: 2006    BREAST SURGERY      CARDIAC PACEMAKER PLACEMENT      x 3 2006    CATARACT EXTRACTION      CYSTOSCOPY  04/04/2014    diagnostic    HYSTERECTOMY      ALISON BSO; due to fibroid uterus; age 38   Exgi[facility]omo Lyone KNEE CARTILAGE SURGERY      excision lesion of meniscus or capsule knee    KNEE SURGERY      OTHER SURGICAL HISTORY      radiation therapy    VT COLONOSCOPY FLX DX W/COLLJ SPEC WHEN PFRMD N/A 2/8/2017    Procedure: COLONOSCOPY;  Surgeon: Arina Campbell MD;  [facility]: [facility]; Service: Gastroenterology    VT ESOPHAGOGASTRODUODENOSCOPY TRANSORAL DIAGNOSTIC N/A 9/20/2017    Procedure: ESOPHAGOGASTRODUODENOSCOPY (EGD); Surgeon: Maru Davis MD;  Location: [facility];   Service: Gastroenterology       Family History   Problem Relation Age of Onset    Hypertension Mother     Heart disease Father     Aneurysm Father     Coronary artery disease Father         in his 76s with aneurysm    Aortic aneurysm Father         abdominal    Scleroderma Sister    [place] Breast cancer Sister     Hypertension Sister     No Known Problems Son     Testicular cancer Son     Thyroid cancer Son     Colon cancer Maternal Aunt     Colon cancer Maternal Aunt        Social History     Occupational History    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      retired     Social History Main Topics    Smoking status: Former Smoker     Years: 25 00     Quit date: 9/20/1980    Smokeless tobacco: Never Used      Comment: Quit over 30 years ago; quit age 41    Alcohol use Yes    Drug use: No    Sexual activity: Not on file Current Outpatient Prescriptions:     Calcium Acetate, Phos Binder, (CALCIUM ACETATE PO), Take by mouth daily  , Disp: , Rfl:     Cholecalciferol (VITAMIN D3) 5000 units CAPS, Take by mouth, Disp: , Rfl:     clobetasol (TEMOVATE) 0 05 % cream, Apply topically once a week  , Disp: , Rfl:     furosemide (LASIX) 40 mg tablet, TAKE 1 TABLET(40 MG) BY MOUTH DAILY AS NEEDED FOR SWELLING (Patient taking differently: 20 mg oral daily), Disp: 90 tablet, Rfl: 0    gabapentin (NEURONTIN) 800 mg tablet, Take 1 tablet (800 mg total) by mouth 4 (four) times a day, Disp: 360 tablet, Rfl: 1    lisinopril (ZESTRIL) 40 mg tablet, TAKE 1 TABLET BY MOUTH  EVERY DAY AS DIRECTED, Disp: 90 tablet, Rfl: 3    Metoprolol Tartrate 75 MG TABS, Take 1 tablet (75 mg total) by mouth 2 (two) times a day, Disp: 60 tablet, Rfl: 0    multivitamin (THERAGRAN) TABS, Take 1 tablet by mouth daily, Disp: , Rfl:     mupirocin (BACTROBAN) 2 % ointment, Apply topically 3 (three) times a day prn, Disp: 30 g, Rfl: 1    pantoprazole (PROTONIX) 40 mg tablet, Take 1 tablet (40 mg total) by mouth daily for 30 days, Disp: 30 tablet, Rfl: 0    pramipexole (MIRAPEX) 0 5 mg tablet, One and half tablet at 5:00 p m  and 10:00 p m , Disp: 270 tablet, Rfl: 0    Probiotic Product (PROBIOTIC PO), Take by mouth, Disp: , Rfl:     warfarin (COUMADIN) 5 mg tablet, TAKE 1 AND 1/2 TABLETS TO 2 TABLETS BY MOUTH DAILY OR  AS ORDERED BY PHYSICIAN, Disp: 180 tablet, Rfl: 3    Allergies   Allergen Reactions    Cephalexin Rash    Duloxetine Hcl Other (See Comments)     Facial pins and needles sensation    Erythromycin Rash    Levofloxacin Other (See Comments)     Muscular aches    Penicillins Rash    Savella [Milnacipran] Rash    Sulfa Antibiotics Rash       Objective: There were no vitals filed for this visit  Body mass index is 39 82 kg/m²      Right Knee Exam     Tenderness   The patient is experiencing tenderness in the medial retinaculum, patella, patellar tendon, medial joint line and lateral joint line (medial and lateral patellar facets)  Range of Motion   Extension:  0 normal   Flexion:  110 abnormal     Muscle Strength     The patient has normal right knee strength  Tests   Ned:  Medial - negative Lateral - negative  Lachman:  Anterior - negative      Drawer:       Anterior - negative      Varus: negative  Valgus: negative  Patellar Apprehension: negative    Other   Erythema: absent  Scars: absent  Sensation: normal  Pulse: present  Swelling: none  Other tests: no effusion present    Comments:  Positive patellofemoral crepitus and PFG  Collateral ligaments stable at 0, 30, and 90 degrees  Thigh and calf soft and non tender  Ambulates slowly with a slightly antalgic gait          Observations     Right Knee   Negative for effusion  Physical Exam   Constitutional: She is oriented to person, place, and time  She appears well-developed and well-nourished  Body mass index is 39 82 kg/m²  HENT:   Head: Normocephalic and atraumatic  Eyes: EOM are normal    Neck: Normal range of motion  Cardiovascular: Intact distal pulses  Pulmonary/Chest: Effort normal    Musculoskeletal:        Right knee: She exhibits no effusion  See ortho exam   Neurological: She is alert and oriented to person, place, and time  Skin: Skin is warm and dry  Psychiatric: She has a normal mood and affect   Her behavior is normal  Judgment and thought content normal

## 2018-12-10 ENCOUNTER — APPOINTMENT (OUTPATIENT)
Dept: LAB | Facility: CLINIC | Age: 78
End: 2018-12-10
Payer: MEDICARE

## 2018-12-10 ENCOUNTER — OFFICE VISIT (OUTPATIENT)
Dept: GASTROENTEROLOGY | Facility: CLINIC | Age: 78
End: 2018-12-10
Payer: MEDICARE

## 2018-12-10 ENCOUNTER — OFFICE VISIT (OUTPATIENT)
Dept: INTERNAL MEDICINE CLINIC | Facility: CLINIC | Age: 78
End: 2018-12-10
Payer: MEDICARE

## 2018-12-10 VITALS
HEART RATE: 75 BPM | SYSTOLIC BLOOD PRESSURE: 111 MMHG | TEMPERATURE: 96.7 F | DIASTOLIC BLOOD PRESSURE: 74 MMHG | WEIGHT: 234.4 LBS | BODY MASS INDEX: 40.02 KG/M2 | HEIGHT: 64 IN

## 2018-12-10 VITALS
SYSTOLIC BLOOD PRESSURE: 110 MMHG | OXYGEN SATURATION: 99 % | HEART RATE: 92 BPM | WEIGHT: 234.2 LBS | DIASTOLIC BLOOD PRESSURE: 68 MMHG | RESPIRATION RATE: 18 BRPM | BODY MASS INDEX: 39.98 KG/M2 | HEIGHT: 64 IN

## 2018-12-10 DIAGNOSIS — I48.20 CHRONIC ATRIAL FIBRILLATION (HCC): ICD-10-CM

## 2018-12-10 DIAGNOSIS — E66.01 CLASS 3 SEVERE OBESITY DUE TO EXCESS CALORIES WITH SERIOUS COMORBIDITY AND BODY MASS INDEX (BMI) OF 40.0 TO 44.9 IN ADULT (HCC): ICD-10-CM

## 2018-12-10 DIAGNOSIS — K21.9 GASTROESOPHAGEAL REFLUX DISEASE, ESOPHAGITIS PRESENCE NOT SPECIFIED: Primary | ICD-10-CM

## 2018-12-10 DIAGNOSIS — E11.42 DIABETIC POLYNEUROPATHY ASSOCIATED WITH TYPE 2 DIABETES MELLITUS (HCC): ICD-10-CM

## 2018-12-10 LAB
ANION GAP SERPL CALCULATED.3IONS-SCNC: 10 MMOL/L (ref 4–13)
BASOPHILS # BLD AUTO: 0.02 THOUSANDS/ΜL (ref 0–0.1)
BASOPHILS NFR BLD AUTO: 0 % (ref 0–1)
BUN SERPL-MCNC: 44 MG/DL (ref 5–25)
CALCIUM SERPL-MCNC: 9.9 MG/DL (ref 8.3–10.1)
CHLORIDE SERPL-SCNC: 100 MMOL/L (ref 100–108)
CO2 SERPL-SCNC: 30 MMOL/L (ref 21–32)
CREAT SERPL-MCNC: 1.16 MG/DL (ref 0.6–1.3)
EOSINOPHIL # BLD AUTO: 0 THOUSAND/ΜL (ref 0–0.61)
EOSINOPHIL NFR BLD AUTO: 0 % (ref 0–6)
ERYTHROCYTE [DISTWIDTH] IN BLOOD BY AUTOMATED COUNT: 13.5 % (ref 11.6–15.1)
EST. AVERAGE GLUCOSE BLD GHB EST-MCNC: 126 MG/DL
GFR SERPL CREATININE-BSD FRML MDRD: 46 ML/MIN/1.73SQ M
GLUCOSE SERPL-MCNC: 93 MG/DL (ref 65–140)
HBA1C MFR BLD: 6 % (ref 4.2–6.3)
HCT VFR BLD AUTO: 42.7 % (ref 34.8–46.1)
HGB BLD-MCNC: 14 G/DL (ref 11.5–15.4)
IMM GRANULOCYTES # BLD AUTO: 0.07 THOUSAND/UL (ref 0–0.2)
IMM GRANULOCYTES NFR BLD AUTO: 1 % (ref 0–2)
LYMPHOCYTES # BLD AUTO: 2.03 THOUSANDS/ΜL (ref 0.6–4.47)
LYMPHOCYTES NFR BLD AUTO: 17 % (ref 14–44)
MCH RBC QN AUTO: 31.7 PG (ref 26.8–34.3)
MCHC RBC AUTO-ENTMCNC: 32.8 G/DL (ref 31.4–37.4)
MCV RBC AUTO: 97 FL (ref 82–98)
MONOCYTES # BLD AUTO: 0.86 THOUSAND/ΜL (ref 0.17–1.22)
MONOCYTES NFR BLD AUTO: 7 % (ref 4–12)
NEUTROPHILS # BLD AUTO: 8.82 THOUSANDS/ΜL (ref 1.85–7.62)
NEUTS SEG NFR BLD AUTO: 75 % (ref 43–75)
NRBC BLD AUTO-RTO: 0 /100 WBCS
PLATELET # BLD AUTO: 226 THOUSANDS/UL (ref 149–390)
PMV BLD AUTO: 11.2 FL (ref 8.9–12.7)
POTASSIUM SERPL-SCNC: 4.2 MMOL/L (ref 3.5–5.3)
RBC # BLD AUTO: 4.42 MILLION/UL (ref 3.81–5.12)
SODIUM SERPL-SCNC: 140 MMOL/L (ref 136–145)
WBC # BLD AUTO: 11.8 THOUSAND/UL (ref 4.31–10.16)

## 2018-12-10 PROCEDURE — 80048 BASIC METABOLIC PNL TOTAL CA: CPT | Performed by: INTERNAL MEDICINE

## 2018-12-10 PROCEDURE — 85025 COMPLETE CBC W/AUTO DIFF WBC: CPT | Performed by: INTERNAL MEDICINE

## 2018-12-10 PROCEDURE — 83036 HEMOGLOBIN GLYCOSYLATED A1C: CPT | Performed by: INTERNAL MEDICINE

## 2018-12-10 PROCEDURE — 36415 COLL VENOUS BLD VENIPUNCTURE: CPT | Performed by: INTERNAL MEDICINE

## 2018-12-10 PROCEDURE — 99214 OFFICE O/P EST MOD 30 MIN: CPT | Performed by: PHYSICIAN ASSISTANT

## 2018-12-10 PROCEDURE — 99495 TRANSJ CARE MGMT MOD F2F 14D: CPT | Performed by: INTERNAL MEDICINE

## 2018-12-10 NOTE — PATIENT INSTRUCTIONS
Take pantoprazole first thing in the morning, on an empty stomach  See nutritionist, watch your portions  Schedule an appointment with your eye doctor

## 2018-12-10 NOTE — ASSESSMENT & PLAN NOTE
Instructed to take PPI in the morning  Discussed GERD diet  Saw GI earlier today  Monitor hoarseness, declined ENT referral at this time

## 2018-12-10 NOTE — PROGRESS NOTES
Assessment/Plan:    GERD (gastroesophageal reflux disease)  Instructed to take PPI in the morning  Discussed GERD diet  Saw GI earlier today  Monitor hoarseness, declined ENT referral at this time  Atrial fibrillation ([place] [address] ) [I48 [id number]]  HR in the 90s, metoprolol decreased to 75 mg bid  Follow up with cardiology next month  Pedal edema  Now taking furosemide 20 mg daily  Class 3 severe obesity due to excess calories with serious comorbidity and body mass index (BMI) of 40 0 to 44 9 in [facility])  Refer to nutritionist     Diabetic polyneuropathy associated with type 2 diabetes mellitus ([place] [address] )  Recheck A1c today due to eye symptoms  She does not check her sugars at home, would benefit with metformin  Essential hypertension  BP stable, on lisinopril and metoprolol  RLS (restless legs syndrome)  On gabapentin and pramipexole  Diagnoses and all orders for this visit:    Gastroesophageal reflux disease, esophagitis presence not specified    Diabetic polyneuropathy associated with type 2 diabetes mellitus ([place] [address] )  -     Hemoglobin A1C  -     Ambulatory referral to medical nutrition therapy for diabetes; Future    Chronic atrial fibrillation (HCC)  -     Basic metabolic panel  -     CBC and differential    Class 3 severe obesity due to excess calories with serious comorbidity and body mass index (BMI) of 40 0 to 44 9 in [facility])  -     Ambulatory referral to medical nutrition therapy for diabetes; Future      Follow up as scheduled or as needed  Subjective:      Patient ID: Prashant Thornton is a 78 y o  female  Mrs Ortiz is feeling better  She reports epigastric and chest discomfort after a meal full of tomatoes, peppers, onions and garlic  She was admitted to the hospital for a few days, was found to have a rapid heart rate  Her metoprolol was increased to 75 mg twice a day  She continues to take her furosemide daily  She was seen by GI earlier today    She noticed her voice is more hoarse lately  She has been taking pantoprazole every day, usually takes it during lunch  She also noticed more frequent blurring of her vision, while doing [employer] cards  She was seen by our her eye doctor earlier this year  She does not check her sugars  She would like to see a nutritionist, knows she needs to lose weight  She did not take this season since she eats a lot of her cookies  The following portions of the patient's history were reviewed and updated as appropriate: allergies, current medications, past medical history, past social history and problem list     Review of Systems   Constitutional: Negative for appetite change and fatigue  Eyes: Positive for visual disturbance  Negative for photophobia, pain and redness  Respiratory: Negative for cough and shortness of breath  Cardiovascular: Negative for chest pain and leg swelling  Gastrointestinal: Negative for abdominal pain, constipation and nausea  Genitourinary: Negative for dysuria  Skin: Negative for rash  Neurological: Negative for dizziness, numbness and headaches  Psychiatric/Behavioral: Negative for confusion  Objective:      /68   Pulse 92   Resp 18   Ht 5' 4" (1 626 m)   Wt 106 kg (234 lb 3 2 oz)   SpO2 99%   BMI 40 20 kg/m²          Physical Exam   Constitutional: She is oriented to person, place, and time  She appears well-developed and well-nourished  HENT:   Head: Normocephalic and atraumatic  Nose: Nose normal    Eyes: Pupils are equal, round, and reactive to light  Conjunctivae are normal    Neck: Neck supple  Cardiovascular: Normal rate, regular rhythm and normal heart sounds  No edema  Pulmonary/Chest: Effort normal and breath sounds normal  She has no wheezes  She has no rales  Abdominal: Soft  Bowel sounds are normal    Neurological: She is alert and oriented to person, place, and time  Skin: Skin is warm  No rash noted     Psychiatric: She has a normal mood and affect  Her behavior is normal    Nursing note and vitals reviewed  Lab &/or imaging results reviewed with patient

## 2018-12-10 NOTE — ASSESSMENT & PLAN NOTE
Recheck A1c today due to eye symptoms  She does not check her sugars at home, would benefit with metformin

## 2018-12-10 NOTE — PROGRESS NOTES
[facility] - Outpatient Follow-up Note  Nadine Carranza 78 y o  female MRN: [id number]  Encounter: [id number]          ASSESSMENT AND PLAN:      Diagnoses and all orders for this visit:    Gastroesophageal reflux disease, esophagitis presence not specified    - continue on PPI once daily; dietary modifications were reviewed  - will follow up in 2 months and may change to PRN ranitidine which has worked well for her in the past  - she plans to see a dietician in the hopes of achieving some weight loss which will also help with her GERD symptoms  - No plans for EGD at this time but will re-assess at follow up visit   ______________________________________________________________________    SUBJECTIVE:  Mrs Rukhsana Adams is a 44-year-old female wit history of A fib s/p pacemaker as well as GERD and hiatal hernia who presents for hospital follow up  She was admitted to [facility] on 11/27/18 with chest pain  She had a negative cardiac workup including normal nuclear stress test, negative troponins, and echo with EF 60%  It was felt that her symptoms were secondary to reflux as she had not been following dietary recommendations and was eating a meal of tomatoes, peppers, and onions prior to the onset of symptoms  She had previously been on PPIs and transitioned to ranitidine, but had discontinued this several months prior  She was diagnosed with Carrero's esophagus in 2012 but has had two negative EGDs since that time, most recently in September 2017  Today, she states she is feeling well  She has been taking the Protonix daily without side effects  She denies nausea/vomiting, melena or hematochezia  No dysphagia or odynophagia  She is planned to see her PCP this afternoon and wishes to discuss seeing a dietician for assistance with an anti-reflux diet as well as weight loss  REVIEW OF SYSTEMS IS OTHERWISE NEGATIVE        Historical Information   Past Medical History:   Diagnosis Date    At[facility] ([facility] )     Carrero's esophagus     last assessed: 1/23/2018    Breast cancer ([facility] )     stage 1 (left), given adjuvant radiation with Arimidex x 5 years    Cancer [facility])     Left Breast, Lumpectomy    Cataract     last assessed: 3/11/2014    Dysplasia of toenail     last assessed: 8/29/2017    Esophageal reflux     GERD (gastroesophageal reflux disease)     Gross hematuria     last assessed: 2/19/2015    Hematuria     Hiatal hernia     Hypertension     Irregular heart beat     AFIB    Mixed sensory-motor polyneuropathy     Neuropathy     Obesity     Pacemaker     Paroxysmal atrial fibrillation (HCC)     Peripheral neuropathy     Rectal bleeding     Restless leg syndrome     Shortness of breath     last assessed: 1/11/2016     Past Surgical History:   Procedure Laterality Date    BREAST LUMPECTOMY Left     onset: 2006    BREAST SURGERY      CARDIAC PACEMAKER PLACEMENT      x 3 2006    CATARACT EXTRACTION      COLONOSCOPY      CYSTOSCOPY  04/04/2014    diagnostic    HYSTERECTOMY      ALISON BSO; due to fibroid uterus; age 38   [employer] KNEE CARTILAGE SURGERY      excision lesion of meniscus or capsule knee    KNEE SURGERY      OTHER SURGICAL HISTORY      radiation therapy    SC COLONOSCOPY FLX DX W/COLLJ SPEC WHEN PFRMD N/A 2/8/2017    Procedure: COLONOSCOPY;  Surgeon: Pravin Lara MD;  [facility]; Service: Gastroenterology    SC ESOPHAGOGASTRODUODENOSCOPY TRANSORAL DIAGNOSTIC N/A 9/20/2017    Procedure: ESOPHAGOGASTRODUODENOSCOPY (EGD); Surgeon: Lucy Santos MD;  [facility];   Service: Gastroenterology    UPPER GASTROINTESTINAL ENDOSCOPY       Social History   History   Alcohol Use    Yes     History   Drug Use No     History   Smoking Status    Former Smoker    Years: 25 00    Quit date: 9/20/1980   Smokeless Tobacco    Never Used     Comment: Quit over 30 years ago; quit age 41     Family History   Problem Relation Age of Onset    Hypertension Mother  Heart disease Father     Aneurysm Father     Coronary artery disease Father         in his 76s with aneurysm    Aortic aneurysm Father         abdominal    Scleroderma Sister     Breast cancer Sister     Hypertension Sister     No Known Problems Son     Testicular cancer Son     Thyroid cancer Son     Colon cancer Maternal Aunt     Colon cancer Maternal Aunt        Meds/Allergies       Current Outpatient Prescriptions:     Calcium Acetate, Phos Binder, (CALCIUM ACETATE PO)    Cholecalciferol (VITAMIN D3) 5000 units CAPS    clobetasol (TEMOVATE) 0 05 % cream    furosemide (LASIX) 40 mg tablet    gabapentin (NEURONTIN) 800 mg tablet    lisinopril (ZESTRIL) 40 mg tablet    Metoprolol Tartrate 75 MG TABS    multivitamin (THERAGRAN) TABS    mupirocin (BACTROBAN) 2 % ointment    pantoprazole (PROTONIX) 40 mg tablet    pramipexole (MIRAPEX) 0 5 mg tablet    Probiotic Product (PROBIOTIC PO)    warfarin (COUMADIN) 5 mg tablet    Allergies   Allergen Reactions    Cephalexin Rash    Duloxetine Hcl Other (See Comments)     Facial pins and needles sensation    Erythromycin Rash    Levofloxacin Other (See Comments)     Muscular aches    Penicillins Rash    Savella [Milnacipran] Rash    Sulfa Antibiotics Rash           Objective     Blood pressure 111/74, pulse 75, temperature (!) 96 7 °F (35 9 °C), temperature source Tympanic, height 5' 4" (1 626 m), weight 106 kg (234 lb 6 4 oz)  Body mass index is 40 23 kg/m²  PHYSICAL EXAM:      General Appearance:   Alert, cooperative, no distress   HEENT:   Normocephalic, atraumatic, sclera anicteric      Neck:  Supple, symmetrical, no lymphadenopathy, trachea midline   Lungs:   Clear to auscultation bilaterally; no rales, rhonchi or wheezing; respirations unlabored    Heart[de-identified]   Regular rate and rhythm; no murmur, rub, or gallop     Abdomen:   Soft/obese, non-tender without rebound or guarding, non-distended; positive bowel sounds; no masses, no organomegaly    Genitalia:   Deferred    Rectal:   Deferred    Extremities:  No cyanosis, clubbing or edema    Pulses:  2+ and symmetric    Skin:  No jaundice, rashes, or lesions            Seema Cavazos PA-C

## 2018-12-10 NOTE — PROGRESS NOTES
TCM Call (since 11/9/2018)     Date and time call was made  11/30/2018  3:02 PM    Hospital care reviewed  Records reviewed    Patient was hospitialized at  [facility]    Date of Admission  11/27/18    Date of discharge  11/29/18    Diagnosis   Epigastric abdominal pain    Disposition  Home    Were the patients medications reviewed and updated  Yes    Current Symptoms  None      TCM Call (since 11/9/2018)     Post hospital issues  None    Should patient be enrolled in anticoag monitoring? No    Scheduled for follow up?   Yes    Did you obtain your prescribed medications  Yes    Do you need help managing your prescriptions or medications  No    Is transportation to your appointment needed  No    I have advised the patient to call PCP with any new or worsening symptoms  AE 11/30/18    Counseling  Patient

## 2018-12-10 NOTE — LETTER
December 10, 2018     Ezekiel Bell MD  [address]  [id number] [place] [address]    Patient: Othel Krabbe   YOB: 1940   Date of Visit: 12/10/2018       Dear Dr Warden Gardner: Thank you for referring Othel Krabbe to me for evaluation  Below are my notes for this consultation  If you have questions, please do not hesitate to call me  I look forward to following your patient along with you  Sincerely,        Ed Newsome PA-C        CC: No Recipients  Rip Kaur  12/10/2018 11:36 AM  Sign at close encounter  [facility] - Outpatient Follow-up Note  Othel Krabbe 78 y o  female MRN: [id number]  Encounter: [id number]          ASSESSMENT AND PLAN:      Diagnoses and all orders for this visit:    Gastroesophageal reflux disease, esophagitis presence not specified    - continue on PPI once daily; dietary modifications were reviewed  - will follow up in 2 months and may change to PRN ranitidine which has worked well for her in the past  - she plans to see a dietician in the hopes of achieving some weight loss which will also help with her GERD symptoms  - No plans for EGD at this time but will re-assess at follow up visit   ______________________________________________________________________    SUBJECTIVE:  Mrs Nash Aguirre is a 56-year-old female wit history of A fib s/p pacemaker as well as GERD and hiatal hernia who presents for hospital follow up  She was admitted to [facility] on 11/27/18 with chest pain  She had a negative cardiac workup including normal nuclear stress test, negative troponins, and echo with EF 60%  It was felt that her symptoms were secondary to reflux as she had not been following dietary recommendations and was eating a meal of tomatoes, peppers, and onions prior to the onset of symptoms  She had previously been on PPIs and transitioned to ranitidine, but had discontinued this several months prior    She was diagnosed with Carrero's esophagus in 2012 but has had two negative EGDs since that time, most recently in September 2017  Today, she states she is feeling well  She has been taking the Protonix daily without side effects  She denies nausea/vomiting, melena or hematochezia  No dysphagia or odynophagia  She is planned to see her PCP this afternoon and wishes to discuss seeing a dietician for assistance with an anti-reflux diet as well as weight loss  REVIEW OF SYSTEMS IS OTHERWISE NEGATIVE        Historical Information   Past Medical History:   Diagnosis Date    [facility]     Carrero's esophagus     last assessed: 1/23/2018    [facility] ([facility] )     stage 1 (left), given adjuvant radiation with Arimidex x 5 years    Cancer [facility])     Left Breast, Lumpectomy    Cataract     last assessed: 3/11/2014    Dysplasia of toenail     last assessed: 8/29/2017    Esophageal reflux     GERD (gastroesophageal reflux disease)     Gross hematuria     last assessed: 2/19/2015    Hematuria     Hiatal hernia     Hypertension     Irregular heart beat     AFIB    Mixed sensory-motor polyneuropathy     Neuropathy     Obesity     Pacemaker     Paroxysmal atrial fibrillation (HCC)     Peripheral neuropathy     Rectal bleeding     Restless leg syndrome     Shortness of breath     last assessed: 1/11/2016     Past Surgical History:   Procedure Laterality Date    BREAST LUMPECTOMY Left     onset: 2006    BREAST SURGERY      CARDIAC PACEMAKER PLACEMENT      x 3 2006    CATARACT EXTRACTION      COLONOSCOPY      CYSTOSCOPY  04/04/2014    diagnostic    HYSTERECTOMY      ALISON BSO; due to fibroid uterus; age 38   [facility] KNEE CARTILAGE SURGERY      excision lesion of meniscus or capsule knee    KNEE SURGERY      OTHER SURGICAL HISTORY      radiation therapy    IN COLONOSCOPY FLX DX W/COLLJ SPEC WHEN PFRMD N/A 2/8/2017    Procedure: COLONOSCOPY;  Surgeon: Soraya Rivas MD;  [facility]; Service: Gastroenterology    MA ESOPHAGOGASTRODUODENOSCOPY TRANSORAL DIAGNOSTIC N/A 9/20/2017    Procedure: ESOPHAGOGASTRODUODENOSCOPY (EGD); Surgeon: Kalpana Trent MD;  [facility];   Service: Gastroenterology    UPPER GASTROINTESTINAL ENDOSCOPY       Social History   History   Alcohol Use    Yes     History   Drug Use No     History   Smoking Status    Former Smoker    Years: 25 00    Quit date: 9/20/1980   Smokeless Tobacco    Never Used     Comment: Quit over 30 years ago; quit age 41     Family History   Problem Relation Age of Onset    Hypertension Mother     Heart disease Father     Aneurysm Father     Coronary artery disease Father         in his 76s with aneurysm    Aortic aneurysm Father         abdominal    Scleroderma Sister     Breast cancer Sister     Hypertension Sister     No Known Problems Son     Testicular cancer Son     Thyroid cancer Son     Colon cancer Maternal Aunt     Colon cancer Maternal Aunt        Meds/Allergies       Current Outpatient Prescriptions:     Calcium Acetate, Phos Binder, (CALCIUM ACETATE PO)    Cholecalciferol (VITAMIN D3) 5000 units CAPS    clobetasol (TEMOVATE) 0 05 % cream    furosemide (LASIX) 40 mg tablet    gabapentin (NEURONTIN) 800 mg tablet    lisinopril (ZESTRIL) 40 mg tablet    Metoprolol Tartrate 75 MG TABS    multivitamin (THERAGRAN) TABS    mupirocin (BACTROBAN) 2 % ointment    pantoprazole (PROTONIX) 40 mg tablet    pramipexole (MIRAPEX) 0 5 mg tablet    Probiotic Product (PROBIOTIC PO)    warfarin (COUMADIN) 5 mg tablet    Allergies   Allergen Reactions    Cephalexin Rash    Duloxetine Hcl Other (See Comments)     Facial pins and needles sensation    Erythromycin Rash    Levofloxacin Other (See Comments)     Muscular aches    Penicillins Rash    Savella [Milnacipran] Rash    Sulfa Antibiotics Rash           Objective     Blood pressure 111/74, pulse 75, temperature (!) 96 7 °F (35 9 °C), temperature source Tympanic, height 5' 4" (1 626 m), weight 106 kg (234 lb 6 4 oz)  Body mass index is 40 23 kg/m²  PHYSICAL EXAM:      General Appearance:   Alert, cooperative, no distress   HEENT:   Normocephalic, atraumatic, sclera anicteric      Neck:  Supple, symmetrical, no lymphadenopathy, trachea midline   Lungs:   Clear to auscultation bilaterally; no rales, rhonchi or wheezing; respirations unlabored    Heart[de-identified]   Regular rate and rhythm; no murmur, rub, or gallop     Abdomen:   Soft/obese, non-tender without rebound or guarding, non-distended; positive bowel sounds; no masses, no organomegaly    Genitalia:   Deferred    Rectal:   Deferred    Extremities:  No cyanosis, clubbing or edema    Pulses:  2+ and symmetric    Skin:  No jaundice, rashes, or lesions            Kim Medina PA-C

## 2018-12-11 ENCOUNTER — TELEPHONE (OUTPATIENT)
Dept: INTERNAL MEDICINE CLINIC | Facility: CLINIC | Age: 78
End: 2018-12-11

## 2018-12-11 NOTE — TELEPHONE ENCOUNTER
SPOKE W/ PT  AND ADV'D  PLEASE PUT LAB ORDER IN [facility]  PT  WILL GO TO [facility] TO DO BEFORE HER NEXT APPT

## 2018-12-11 NOTE — TELEPHONE ENCOUNTER
Sugars better but kidney function is still not within normal   Stay hydrated  Avoid NSAIDs such as ibuprofen, naproxen, Advil, Aleve or Motrin  Take Tylenol for pain   Will recheck kidney function in march before your follow up appt

## 2018-12-20 ENCOUNTER — APPOINTMENT (OUTPATIENT)
Dept: LAB | Facility: HOSPITAL | Age: 78
End: 2018-12-20
Payer: MEDICARE

## 2018-12-20 ENCOUNTER — ANTICOAG VISIT (OUTPATIENT)
Dept: CARDIOLOGY CLINIC | Facility: CLINIC | Age: 78
End: 2018-12-20

## 2018-12-20 DIAGNOSIS — I48.20 CHRONIC ATRIAL FIBRILLATION (HCC): ICD-10-CM

## 2018-12-20 DIAGNOSIS — I48.91 ATRIAL FIBRILLATION, UNSPECIFIED TYPE (HCC): ICD-10-CM

## 2018-12-20 LAB
INR PPP: 2.33 (ref 0.86–1.16)
PROTHROMBIN TIME: 23.3 SECONDS (ref 9.4–11.7)

## 2018-12-20 PROCEDURE — 85610 PROTHROMBIN TIME: CPT

## 2018-12-20 PROCEDURE — 36415 COLL VENOUS BLD VENIPUNCTURE: CPT

## 2019-01-14 ENCOUNTER — HOSPITAL ENCOUNTER (OUTPATIENT)
Dept: RADIOLOGY | Facility: HOSPITAL | Age: 79
Discharge: HOME | End: 2019-01-14
Payer: MEDICARE

## 2019-01-14 VITALS — WEIGHT: 234 LBS | HEIGHT: 64 IN | BODY MASS INDEX: 39.95 KG/M2

## 2019-01-14 DIAGNOSIS — Z12.31 ENCOUNTER FOR SCREENING MAMMOGRAM FOR BREAST CANCER: ICD-10-CM

## 2019-01-14 PROCEDURE — 77067 SCR MAMMO BI INCL CAD: CPT

## 2019-01-14 PROCEDURE — 77063 BREAST TOMOSYNTHESIS BI: CPT

## 2019-01-22 ENCOUNTER — REMOTE DEVICE CLINIC VISIT (OUTPATIENT)
Dept: CARDIOLOGY CLINIC | Facility: CLINIC | Age: 79
End: 2019-01-22
Payer: MEDICARE

## 2019-01-22 DIAGNOSIS — I48.20 CHRONIC ATRIAL FIBRILLATION (HCC): Primary | ICD-10-CM

## 2019-01-22 DIAGNOSIS — Z95.0 CARDIAC PACEMAKER IN SITU: ICD-10-CM

## 2019-01-22 PROCEDURE — 93294 REM INTERROG EVL PM/LDLS PM: CPT | Performed by: INTERNAL MEDICINE

## 2019-01-22 PROCEDURE — 93296 REM INTERROG EVL PM/IDS: CPT | Performed by: INTERNAL MEDICINE

## 2019-01-22 NOTE — PROGRESS NOTES
Results for orders placed or performed in visit on 01/22/19   Cardiac EP device report    Narrative    MDT-SINGLE-PM  CARELINK TRANSMISSION: BATTERY VOLTAGE ADEQUATE (4 YRS)  -14%  ALL AVAILABLE LEAD PARAMETERS WITHIN NORMAL LIMITS  1,472 VHR EPISODES MAX DURATION 11 1 MINS, AVG HR-175 BPM (NO EGM FOR THIS EPISODE)- PROB RVR BUT CANNOT EXCLUDE ANY NSVT PARTICULARLY WHEN CHANNEL MARKERS ONLY  HX OF SAME  EF-60% (ECHO 11/28/18)  HX: CHRONIC AF & ON WARFARIN & METOPROLOL  NORMAL DEVICE FUNCTION   GV

## 2019-01-25 ENCOUNTER — ANTICOAG VISIT (OUTPATIENT)
Dept: CARDIOLOGY CLINIC | Facility: CLINIC | Age: 79
End: 2019-01-25

## 2019-01-25 ENCOUNTER — APPOINTMENT (OUTPATIENT)
Dept: LAB | Facility: HOSPITAL | Age: 79
End: 2019-01-25
Payer: MEDICARE

## 2019-01-25 ENCOUNTER — TRANSCRIBE ORDERS (OUTPATIENT)
Dept: ADMINISTRATIVE | Facility: HOSPITAL | Age: 79
End: 2019-01-25

## 2019-01-25 DIAGNOSIS — I48.91 ATRIAL FIBRILLATION, UNSPECIFIED TYPE (HCC): ICD-10-CM

## 2019-01-25 DIAGNOSIS — I48.20 CHRONIC ATRIAL FIBRILLATION (HCC): ICD-10-CM

## 2019-01-25 LAB
INR PPP: 2.29 (ref 0.86–1.16)
PROTHROMBIN TIME: 22.9 SECONDS (ref 9.4–11.7)

## 2019-01-25 PROCEDURE — 36415 COLL VENOUS BLD VENIPUNCTURE: CPT

## 2019-01-25 PROCEDURE — 85610 PROTHROMBIN TIME: CPT

## 2019-01-28 DIAGNOSIS — G60.8 SENSORY POLYNEUROPATHY: ICD-10-CM

## 2019-01-28 RX ORDER — GABAPENTIN 800 MG/1
800 TABLET ORAL 4 TIMES DAILY
Qty: 360 TABLET | Refills: 1 | OUTPATIENT
Start: 2019-01-28

## 2019-01-28 NOTE — TELEPHONE ENCOUNTER
Patient was given 3 month supply with 1 refill on October appointment   Should last upto April    Next appointment is on 2/13/19

## 2019-01-30 DIAGNOSIS — G60.8 SENSORY POLYNEUROPATHY: ICD-10-CM

## 2019-01-30 DIAGNOSIS — I48.20 CHRONIC ATRIAL FIBRILLATION (HCC): ICD-10-CM

## 2019-01-30 RX ORDER — GABAPENTIN 800 MG/1
800 TABLET ORAL 4 TIMES DAILY
Qty: 360 TABLET | Refills: 1 | Status: SHIPPED | OUTPATIENT
Start: 2019-01-30 | End: 2019-06-12 | Stop reason: SDUPTHER

## 2019-01-30 NOTE — TELEPHONE ENCOUNTER
Prescription has been send to [facility] pharmacy in [facility] in [place], 3 month supply with 1 refill

## 2019-02-06 ENCOUNTER — OFFICE VISIT (OUTPATIENT)
Dept: URGENT CARE | Facility: CLINIC | Age: 79
End: 2019-02-06
Payer: MEDICARE

## 2019-02-06 VITALS
TEMPERATURE: 99.2 F | SYSTOLIC BLOOD PRESSURE: 111 MMHG | BODY MASS INDEX: 40.63 KG/M2 | HEART RATE: 104 BPM | WEIGHT: 238 LBS | RESPIRATION RATE: 19 BRPM | HEIGHT: 64 IN | OXYGEN SATURATION: 97 % | DIASTOLIC BLOOD PRESSURE: 78 MMHG

## 2019-02-06 DIAGNOSIS — J02.9 ACUTE PHARYNGITIS, UNSPECIFIED ETIOLOGY: Primary | ICD-10-CM

## 2019-02-06 PROCEDURE — 99213 OFFICE O/P EST LOW 20 MIN: CPT | Performed by: PHYSICIAN ASSISTANT

## 2019-02-06 PROCEDURE — 87070 CULTURE OTHR SPECIMN AEROBIC: CPT | Performed by: PHYSICIAN ASSISTANT

## 2019-02-06 RX ORDER — METOPROLOL TARTRATE 75 MG/1
75 TABLET, FILM COATED ORAL
Refills: 0 | COMMUNITY
Start: 2018-12-30 | End: 2019-02-21 | Stop reason: SDUPTHER

## 2019-02-06 NOTE — PROGRESS NOTES
[address] [facility]        NAME: Franklin Harmon is a 70 y o  female  : 1940    MRN: [id number]  DATE: 2019  TIME: 10:23 AM    Assessment and Plan   Acute pharyngitis, unspecified etiology [J02 9]  1  Acute pharyngitis, unspecified etiology  Throat culture         Patient Instructions     Discussed condition with pt  I suspect acute viral pharyngitis/URI but will send out throat culture to further evaluate  In the interim, I rec hydration, rest, soft diet, discussed OTC cold meds, fever and pain control, and observation  Follow up with PCP in 3-5 days  Proceed to  ER if symptoms worsen  Chief Complaint     Chief Complaint   Patient presents with    Sore Throat     Pt reports of fever with URI s/s and worsening sore throat  History of Present Illness       Pt presents with onset this AM of fever up to 100 4 F, ST, runny nose  Denies nasal congestion, cough, swollen glands, difficulty swallowing, N/V/D  She has taken Coricedin  Has seasonal allergies but no asthma  Does not smoke  She has received the flu shot  Review of Systems   Review of Systems   Constitutional: Positive for fever  HENT: Positive for rhinorrhea and sore throat  Negative for congestion and trouble swallowing  Respiratory: Negative  Cardiovascular: Negative  Gastrointestinal: Negative  Genitourinary: Negative  Hematological: Negative for adenopathy           Current Medications       Current Outpatient Prescriptions:     Calcium Acetate, Phos Binder, (CALCIUM ACETATE PO), Take by mouth daily  , Disp: , Rfl:     Cholecalciferol (VITAMIN D3) 5000 units CAPS, Take by mouth, Disp: , Rfl:     clobetasol (TEMOVATE) 0 05 % cream, Apply topically once a week  , Disp: , Rfl:     furosemide (LASIX) 40 mg tablet, TAKE 1 TABLET(40 MG) BY MOUTH DAILY AS NEEDED FOR SWELLING (Patient taking differently: 20 mg oral daily), Disp: 90 tablet, Rfl: 0    gabapentin (NEURONTIN) 800 mg tablet, Take 1 tablet (800 mg total) by mouth 4 (four) times a day, Disp: 360 tablet, Rfl: 1    lisinopril (ZESTRIL) 40 mg tablet, TAKE 1 TABLET BY MOUTH  EVERY DAY AS DIRECTED, Disp: 90 tablet, Rfl: 3    metoprolol tartrate (LOPRESSOR) 50 mg tablet, , Disp: , Rfl: 0    Metoprolol Tartrate 75 MG TABS, Take 1 tablet (75 mg total) by mouth 2 (two) times a day, Disp: 60 tablet, Rfl: 0    multivitamin (THERAGRAN) TABS, Take 1 tablet by mouth daily, Disp: , Rfl:     mupirocin (BACTROBAN) 2 % ointment, Apply topically 3 (three) times a day prn, Disp: 30 g, Rfl: 1    pantoprazole (PROTONIX) 40 mg tablet, Take 1 tablet (40 mg total) by mouth daily for 30 days, Disp: 30 tablet, Rfl: 0    pramipexole (MIRAPEX) 0 5 mg tablet, One and half tablet at 5:00 p m  and 10:00 p m , Disp: 270 tablet, Rfl: 0    Probiotic Product (PROBIOTIC PO), Take by mouth, Disp: , Rfl:     warfarin (COUMADIN) 5 mg tablet, TAKE 1 AND 1/2 TABLETS TO 2 TABLETS BY MOUTH DAILY OR  AS ORDERED BY PHYSICIAN, Disp: 180 tablet, Rfl: 3    Current Allergies     Allergies as of 02/06/2019 - Reviewed 02/06/2019   Allergen Reaction Noted    Cephalexin Rash 03/11/2015    Duloxetine hcl Other (See Comments) 09/20/2017    Erythromycin Rash 02/07/2017    Levofloxacin Other (See Comments) 09/20/2017    Penicillins Rash 02/07/2017    Savella [milnacipran] Rash 04/11/2018    Sulfa antibiotics Rash 02/07/2017            The following portions of the patient's history were reviewed and updated as appropriate: allergies, current medications, past family history, past medical history, past social history, past surgical history and problem list      Past Medical History:   Diagnosis Date    Atrial fibrillation [facility] [facility] )     Carrero's esophagus     last assessed: 1/23/2018    Breast cancer ([facility] [address] )     stage 1 (left), given adjuvant radiation with Arimidex x 5 years    Cancer [facility])     Left Breast, Lumpectomy    Cataract     last assessed: 3/11/2014    Dysplasia of toenail last assessed: 8/29/2017    Esophageal reflux     GERD (gastroesophageal reflux disease)     Gross hematuria     last assessed: 2/19/2015    Hematuria     Hiatal hernia     History of radiation therapy     Hypertension     Irregular heart beat     AFIB    Mixed sensory-motor polyneuropathy     Neuropathy     Obesity     Pacemaker     Paroxysmal atrial fibrillation (HCC)     Peripheral neuropathy     Rectal bleeding     Restless leg syndrome     Shortness of breath     last assessed: 1/11/2016       Past Surgical History:   Procedure Laterality Date    BREAST BIOPSY      BREAST LUMPECTOMY Left     onset: 2006    BREAST SURGERY      CARDIAC PACEMAKER PLACEMENT      x 3 2006    CATARACT EXTRACTION      COLONOSCOPY      CYSTOSCOPY  04/04/2014    diagnostic    HYSTERECTOMY      ALISON BSO; due to fibroid uterus; age 38   [facility]rna Mare KNEE CARTILAGE SURGERY      excision lesion of meniscus or capsule knee    KNEE SURGERY      OOPHORECTOMY Bilateral     OTHER SURGICAL HISTORY      radiation therapy    [place] COLONOSCOPY FLX DX W/COLLJ SPEC WHEN PFRMD N/A 2/8/2017    Procedure: COLONOSCOPY;  Surgeon: Fanny Baugh MD;  [facility]; Service: Gastroenterology    [place] ESOPHAGOGASTRODUODENOSCOPY TRANSORAL DIAGNOSTIC N/A 9/20/2017    Procedure: ESOPHAGOGASTRODUODENOSCOPY (EGD); Surgeon: Obdulio Barnett MD;  [facility]; Service: Gastroenterology    UPPER GASTROINTESTINAL ENDOSCOPY         Family History   Problem Relation Age of Onset    Hypertension Mother     Heart disease Father     Aneurysm Father     Coronary artery disease Father         in his 76s with aneurysm    Aortic aneurysm Father         abdominal    Scleroderma Sister     Breast cancer Sister 76    Hypertension Sister     No Known Problems Son     Testicular cancer Son     Thyroid cancer Son     Colon cancer Maternal Aunt     Colon cancer Maternal Aunt          Medications have been verified          Objective   /78 Pulse 104   Temp 99 2 °F (37 3 °C)   Resp 19   Ht 5' 4" (1 626 m)   Wt 108 kg (238 lb)   SpO2 97%   BMI 40 85 kg/m²        Physical Exam     Physical Exam   Constitutional: She is oriented to person, place, and time  She appears well-developed and well-nourished  No distress  HENT:   Right Ear: Hearing, tympanic membrane, external ear and ear canal normal    Left Ear: Hearing, tympanic membrane, external ear and ear canal normal    Nose: Mucosal edema (B/L boggy turbinates) present  Mouth/Throat: Mucous membranes are normal  Posterior oropharyngeal erythema (PND) present  No oropharyngeal exudate  Neck: Neck supple  Cardiovascular: Normal rate  Murmur (Systolic murmur heard on auscultation) heard  Irregularly irregular rhythm   Pulmonary/Chest: Effort normal and breath sounds normal    Lymphadenopathy:     She has no cervical adenopathy  Neurological: She is alert and oriented to person, place, and time  Psychiatric: She has a normal mood and affect  Vitals reviewed

## 2019-02-08 ENCOUNTER — OFFICE VISIT (OUTPATIENT)
Dept: OBGYN CLINIC | Facility: CLINIC | Age: 79
End: 2019-02-08
Payer: MEDICARE

## 2019-02-08 VITALS
DIASTOLIC BLOOD PRESSURE: 65 MMHG | BODY MASS INDEX: 41.18 KG/M2 | SYSTOLIC BLOOD PRESSURE: 103 MMHG | HEART RATE: 99 BPM | WEIGHT: 241.2 LBS | HEIGHT: 64 IN

## 2019-02-08 DIAGNOSIS — E66.01 CLASS 3 SEVERE OBESITY DUE TO EXCESS CALORIES WITH SERIOUS COMORBIDITY AND BODY MASS INDEX (BMI) OF 40.0 TO 44.9 IN ADULT (HCC): ICD-10-CM

## 2019-02-08 DIAGNOSIS — M25.561 CHRONIC PAIN OF BOTH KNEES: ICD-10-CM

## 2019-02-08 DIAGNOSIS — E11.42 DIABETIC POLYNEUROPATHY ASSOCIATED WITH TYPE 2 DIABETES MELLITUS (HCC): ICD-10-CM

## 2019-02-08 DIAGNOSIS — M17.0 BILATERAL PRIMARY OSTEOARTHRITIS OF KNEE: Primary | ICD-10-CM

## 2019-02-08 DIAGNOSIS — M25.562 CHRONIC PAIN OF BOTH KNEES: ICD-10-CM

## 2019-02-08 DIAGNOSIS — G89.29 CHRONIC PAIN OF BOTH KNEES: ICD-10-CM

## 2019-02-08 LAB — BACTERIA THROAT CULT: NORMAL

## 2019-02-08 PROCEDURE — 99213 OFFICE O/P EST LOW 20 MIN: CPT | Performed by: PHYSICIAN ASSISTANT

## 2019-02-08 PROCEDURE — 20610 DRAIN/INJ JOINT/BURSA W/O US: CPT | Performed by: PHYSICIAN ASSISTANT

## 2019-02-08 NOTE — PROGRESS NOTES
Assessment/Plan:  1  Bilateral primary osteoarthritis of knee  Large joint arthrocentesis   2  Chronic pain of both knees  Large joint arthrocentesis   3  Class 3 severe obesity due to excess calories with serious comorbidity and body mass index (BMI) of 40 0 to 44 9 in adult ([facility] [address] )     4  Diabetic polyneuropathy associated with type 2 diabetes mellitus ([facility] )       Jessica Green is a pleasant 57-year-old female with activity related bilateral knee pain due to her severe underlying osteoarthritis  At this point, she is demonstrating failure of cortisone injections and has previously failed Euflexxa injections  Unfortunately, were still unable to offer her a total knee arthroplasty as she is above the the BMI requirements to safely offer her an operation  We discussed treatment options and agreed upon pursuing viscosupplementation with Synvisc, as she does not believe she has had before  We can initiate the series for both knees  However, we currently only have 1 box and can initiate only 1 knee  Because her left knee is currently more symptomatic, she consented to and underwent a left knee Synvisc injection as detailed below without difficulty or complication  She will return next week to continue the series  We will initiate the series for her right knee when we have the medication  She is amenable to this plan  All of her questions were addressed      Large joint arthrocentesis  Date/Time: 2/8/2019 1:50 PM  Consent given by: patient  Site marked: site marked  Timeout: Immediately prior to procedure a time out was called to verify the correct patient, procedure, equipment, support staff and site/side marked as required   Supporting Documentation  Indications: pain   Procedure Details  Location: knee - L knee  Preparation: Patient was prepped and draped in the usual sterile fashion  Needle size: 20 G  Ultrasound guidance: no  Approach: anterolateral  Medications administered: 16 mg hylan 16 MG/2ML    Patient tolerance: patient tolerated the procedure well with no immediate complications  Dressing:  Sterile dressing applied          Subjective: Bilateral knee pain follow up    Patient ID: Lidia Riddle is a 70 y o  female  Turner Lopez is a pleasant 81-year-old female well known to our office for activity related bilateral knee pain due to her severe underlying osteoarthritis  She has been undergoing staggered cortisone injections due to her diabetes, which she reports provided no relief for her right knee 2 months ago only minimal relief for her left knee from 3 months ago  Previously, she did see benefit from the cortisone injections  She has also tried Euflexxa approximately 1 year ago without sufficient relief  She denies any new injuries  Review of Systems   Constitutional: Negative  HENT: Negative  Eyes: Negative  Respiratory: Negative  Cardiovascular: Negative  Gastrointestinal: Negative  Endocrine: Negative  Genitourinary: Negative  Musculoskeletal: Positive for arthralgias  Skin: Negative  Allergic/Immunologic: Negative  Neurological: Negative  Hematological: Negative  Psychiatric/Behavioral: Negative            Past Medical History:   Diagnosis Date    Atrial fibrillation ([facility] )     Carrero's esophagus     last assessed: 1/23/2018    Breast cancer ([facility] )     stage 1 (left), given adjuvant radiation with Arimidex x 5 years    Cancer [facility])     Left Breast, Lumpectomy    Cataract     last assessed: 3/11/2014    Dysplasia of toenail     last assessed: 8/29/2017    Esophageal reflux     GERD (gastroesophageal reflux disease)     Gross hematuria     last assessed: 2/19/2015    Hematuria     Hiatal hernia     History of radiation therapy     Hypertension     Irregular heart beat     AFIB    Mixed sensory-motor polyneuropathy     Neuropathy     Obesity     Pacemaker     Paroxysmal atrial fibrillation (HCC)     Peripheral neuropathy     Rectal bleeding  Restless leg syndrome     Shortness of breath     last assessed: 1/11/2016       Past Surgical History:   Procedure Laterality Date    BREAST BIOPSY      BREAST LUMPECTOMY Left     onset: 2006    BREAST SURGERY      CARDIAC PACEMAKER PLACEMENT      x 3 2006    CATARACT EXTRACTION      COLONOSCOPY      CYSTOSCOPY  04/04/2014    diagnostic    HYSTERECTOMY      ALISON BSO; due to fibroid uterus; age 38   [facility] KNEE CARTILAGE SURGERY      excision lesion of meniscus or capsule knee    KNEE SURGERY      OOPHORECTOMY Bilateral     OTHER SURGICAL HISTORY      radiation therapy    [place] COLONOSCOPY FLX DX W/COLLJ SPEC WHEN PFRMD N/A 2/8/2017    Procedure: COLONOSCOPY;  Surgeon: Soraya Rivas MD;  [facility]; Service: Gastroenterology    NH ESOPHAGOGASTRODUODENOSCOPY TRANSORAL DIAGNOSTIC N/A 9/20/2017    Procedure: ESOPHAGOGASTRODUODENOSCOPY (EGD); Surgeon: Daisy Mathias MD;  [facility];   Service: Gastroenterology    UPPER GASTROINTESTINAL ENDOSCOPY         Family History   Problem Relation Age of Onset    Hypertension Mother     Heart disease Father     Aneurysm Father     Coronary artery disease Father         in his 76s with aneurysm    Aortic aneurysm Father         abdominal    Scleroderma Sister     Breast cancer Sister 76    Hypertension Sister     No Known Problems Son     Testicular cancer Son     Thyroid cancer Son     Colon cancer Maternal Aunt     Colon cancer Maternal Aunt        Social History     Occupational History    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      retired     Social History Main Topics    Smoking status: Former Smoker     Years: 25 00     Quit date: 9/20/1980    Smokeless tobacco: Never Used      Comment: Quit over 30 years ago; quit age 41    Alcohol use Yes    Drug use: No    Sexual activity: Not on file         Current Outpatient Prescriptions:     Calcium Acetate, Phos Binder, (CALCIUM ACETATE PO), Take by mouth daily  , Disp: , Rfl:    Cholecalciferol (VITAMIN D3) 5000 units CAPS, Take by mouth, Disp: , Rfl:     clobetasol (TEMOVATE) 0 05 % cream, Apply topically once a week  , Disp: , Rfl:     furosemide (LASIX) 40 mg tablet, TAKE 1 TABLET(40 MG) BY MOUTH DAILY AS NEEDED FOR SWELLING (Patient taking differently: 20 mg oral daily), Disp: 90 tablet, Rfl: 0    gabapentin (NEURONTIN) 800 mg tablet, Take 1 tablet (800 mg total) by mouth 4 (four) times a day, Disp: 360 tablet, Rfl: 1    lisinopril (ZESTRIL) 40 mg tablet, TAKE 1 TABLET BY MOUTH  EVERY DAY AS DIRECTED, Disp: 90 tablet, Rfl: 3    metoprolol tartrate (LOPRESSOR) 50 mg tablet, , Disp: , Rfl: 0    Metoprolol Tartrate 75 MG TABS, Take 1 tablet (75 mg total) by mouth 2 (two) times a day, Disp: 60 tablet, Rfl: 0    multivitamin (THERAGRAN) TABS, Take 1 tablet by mouth daily, Disp: , Rfl:     mupirocin (BACTROBAN) 2 % ointment, Apply topically 3 (three) times a day prn, Disp: 30 g, Rfl: 1    pramipexole (MIRAPEX) 0 5 mg tablet, One and half tablet at 5:00 p m  and 10:00 p m , Disp: 270 tablet, Rfl: 0    Probiotic Product (PROBIOTIC PO), Take by mouth, Disp: , Rfl:     warfarin (COUMADIN) 5 mg tablet, TAKE 1 AND 1/2 TABLETS TO 2 TABLETS BY MOUTH DAILY OR  AS ORDERED BY PHYSICIAN, Disp: 180 tablet, Rfl: 3    pantoprazole (PROTONIX) 40 mg tablet, Take 1 tablet (40 mg total) by mouth daily for 30 days, Disp: 30 tablet, Rfl: 0    Allergies   Allergen Reactions    Cephalexin Rash    Duloxetine Hcl Other (See Comments)     Facial pins and needles sensation    Erythromycin Rash    Levofloxacin Other (See Comments)     Muscular aches    Penicillins Rash    Savella [Milnacipran] Rash    Sulfa Antibiotics Rash       Objective:  Vitals:    02/08/19 1116   BP: 103/65   Pulse: 99       Body mass index is 41 4 kg/m²  Left Knee Exam     Tenderness   The patient is experiencing tenderness in the lateral joint line, patella and medial joint line      Range of Motion   Extension: 0   Left knee flexion: 115°     Muscle Strength     The patient has normal left knee strength  Tests   Ned:  Medial - negative Lateral - negative  Lachman:  Anterior - negative      Drawer:       Anterior - negative       Varus: negative  Valgus: negative  Patellar Apprehension: negative    Other   Erythema: absent  Scars: absent  Sensation: normal  Pulse: present  Swelling: none  Effusion: no effusion present    Comments:  Crepitance on AROM and PROM-parapatellar  - PFG  No increased warmth of knee  Knee is stable at 0, 30, 90 degrees  Grossly distally NVI  Thigh and calf soft and non tender          Observations   Left Knee   Negative for effusion  Physical Exam   Constitutional: She is oriented to person, place, and time  She appears well-developed and well-nourished  Body mass index is 41 4 kg/m²  HENT:   Head: Normocephalic and atraumatic  Eyes: EOM are normal    Neck: Normal range of motion  Cardiovascular: Intact distal pulses  Pulmonary/Chest: Effort normal    Musculoskeletal:        Left knee: She exhibits no effusion  See ortho exam   Neurological: She is alert and oriented to person, place, and time  Skin: Skin is warm and dry  Psychiatric: She has a normal mood and affect   Her behavior is normal  Judgment and thought content normal

## 2019-02-14 ENCOUNTER — OFFICE VISIT (OUTPATIENT)
Dept: NEUROLOGY | Facility: CLINIC | Age: 79
End: 2019-02-14
Payer: MEDICARE

## 2019-02-14 VITALS
DIASTOLIC BLOOD PRESSURE: 72 MMHG | BODY MASS INDEX: 41.48 KG/M2 | WEIGHT: 243 LBS | SYSTOLIC BLOOD PRESSURE: 108 MMHG | HEART RATE: 101 BPM | HEIGHT: 64 IN | RESPIRATION RATE: 18 BRPM

## 2019-02-14 DIAGNOSIS — G62.89 OTHER POLYNEUROPATHY: Primary | ICD-10-CM

## 2019-02-14 DIAGNOSIS — G25.81 RLS (RESTLESS LEGS SYNDROME): ICD-10-CM

## 2019-02-14 DIAGNOSIS — G31.84 MILD COGNITIVE IMPAIRMENT: ICD-10-CM

## 2019-02-14 PROCEDURE — 99214 OFFICE O/P EST MOD 30 MIN: CPT | Performed by: PSYCHIATRY & NEUROLOGY

## 2019-02-14 RX ORDER — PRAMIPEXOLE DIHYDROCHLORIDE 0.5 MG/1
TABLET ORAL
Qty: 270 TABLET | Refills: 0 | Status: SHIPPED | OUTPATIENT
Start: 2019-02-14 | End: 2019-06-12 | Stop reason: SDUPTHER

## 2019-02-14 NOTE — PROGRESS NOTES
Patient ID: Felipe Lin is a 70 y o  female  Assessment/Plan:  Peripheral neuropathy    Restless leg syndrome    Plan continue gabapentin 800 mg q i d  And pramipexole 0 5 mg tablet 1 and half tablet twice a day  Patient has a in of gabapentin weekly end of the June and I have set and three-month supply from the [employer] to her pharmacy and she has a 2 month supply left at home  I will see the patient in 4 months          Subjective:    [de-identified] year old female followed in the office for peripheral neuropathy and restless leg syndrome  Patient reported having no issue with either diagnosis  Symptoms are improved with the MiraLax 0 5 mg tablet 1/2 twice a day 5:00 p m  And 11:00 p m  And also gabapentin 800 mg 4 times a day  Upon asking patient denies any numbness, pins and needles sensation, tingling 6, or burning and sharp shooting pain  She also denies any bladder and bowel dysfunction  Upon asking she denies creepy crawling sensation, itchiness or any desire to move the legs when she is resting for a long period of time  Patient denies any other neurological symptoms except some difficulty with the memory predominantly and only recent conversation and learning the new events  Pt denies double vision, blurred vision, transient monocular blindness, vertigo, tinnitus, hearing loss, slurred speech, difficulty expressing and understanding, dysphasia, and focal weakness, numbness, imbalance and incoordination  Pt denies bladder and bowel urgency, frequency and incontinence           Past Medical History:   Diagnosis Date    [facility]     Carrero's esophagus     last assessed: 1/23/2018    Breast cancer ([facility] )     stage 1 (left), given adjuvant radiation with Arimidex x 5 years    Cancer [facility])     Left Breast, Lumpectomy    Cataract     last assessed: 3/11/2014    Dysplasia of toenail     last assessed: 8/29/2017    Esophageal reflux     GERD (gastroesophageal reflux disease)     Gross hematuria     last assessed: 2015    Hematuria     Hiatal hernia     History of radiation therapy     Hypertension     Irregular heart beat     AFIB    Mixed sensory-motor polyneuropathy     Neuropathy     Obesity     Pacemaker     Paroxysmal atrial fibrillation (HCC)     Peripheral neuropathy     Rectal bleeding     Restless leg syndrome     Shortness of breath     last assessed: 2016       Family History   Problem Relation Age of Onset    Hypertension Mother     Heart disease Father     Aneurysm Father     Coronary artery disease Father         in his 76s with aneurysm    Aortic aneurysm Father         abdominal    Scleroderma Sister     Breast cancer Sister 76    Hypertension Sister     No Known Problems Son     Testicular cancer Son     Thyroid cancer Son     Colon cancer Maternal Aunt     Colon cancer Maternal Aunt    ,    Social History     Socioeconomic History    Marital status: /Civil Union     Spouse name: Not on file    Number of children: Not on file    Years of education: Not on file    Highest education level: Not on file   Occupational History    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      Comment: retired   Social Needs    Financial resource strain: Not on file    Food insecurity:     Worry: Not on file     Inability: Not on file   [employer] needs:     Medical: Not on file     Non-medical: Not on file   Tobacco Use    Smoking status: Former Smoker     Years: 25 00     Last attempt to quit: 1980     Years since quittin 4    Smokeless tobacco: Never Used    Tobacco comment: Quit over 30 years ago; quit age 41   Substance and Sexual Activity    Alcohol use: Not Currently    Drug use: No    Sexual activity: Not on file   Lifestyle    Physical activity:     Days per week: Not on file     Minutes per session: Not on file    Stress: Not on file   Relationships    Social connections:     Talks on phone: Not on file     Gets together: Not on file     Attends Faith service: Not on file     Active member of club or organization: Not on file     Attends meetings of clubs or organizations: Not on file     Relationship status: Not on file    Intimate partner violence:     Fear of current or ex partner: Not on file     Emotionally abused: Not on file     Physically abused: Not on file     Forced sexual activity: Not on file   Other Topics Concern    Not on file   Social History Narrative           Current Outpatient Medications on File Prior to Visit   Medication Sig Dispense Refill    Calcium Acetate, Phos Binder, (CALCIUM ACETATE PO) Take by mouth daily        Cholecalciferol (VITAMIN D3) 5000 units CAPS Take by mouth      clobetasol (TEMOVATE) 0 05 % cream Apply topically once a week        furosemide (LASIX) 40 mg tablet TAKE 1 TABLET(40 MG) BY MOUTH DAILY AS NEEDED FOR SWELLING (Patient taking differently: 20 mg oral daily) 90 tablet 0    gabapentin (NEURONTIN) 800 mg tablet Take 1 tablet (800 mg total) by mouth 4 (four) times a day 360 tablet 1    lisinopril (ZESTRIL) 40 mg tablet TAKE 1 TABLET BY MOUTH  EVERY DAY AS DIRECTED 90 tablet 3    metoprolol tartrate (LOPRESSOR) 50 mg tablet 75 mg 2 (two) times a day   0    Metoprolol Tartrate 75 MG TABS Take 1 tablet (75 mg total) by mouth 2 (two) times a day 60 tablet 0    multivitamin (THERAGRAN) TABS Take 1 tablet by mouth daily      Probiotic Product (PROBIOTIC PO) Take by mouth      warfarin (COUMADIN) 5 mg tablet TAKE 1 AND 1/2 TABLETS TO 2 TABLETS BY MOUTH DAILY OR  AS ORDERED BY PHYSICIAN 180 tablet 3    [DISCONTINUED] pramipexole (MIRAPEX) 0 5 mg tablet One and half tablet at 5:00 p m  and 10:00 p m  270 tablet 0    pantoprazole (PROTONIX) 40 mg tablet Take 1 tablet (40 mg total) by mouth daily for 30 days 30 tablet 0    [DISCONTINUED] mupirocin (BACTROBAN) 2 % ointment Apply topically 3 (three) times a day prn 30 g 1     No current facility-administered medications on file prior to visit  Objective:    Blood pressure 108/72, pulse 101, resp  rate 18, height 5' 4" (1 626 m), weight 110 kg (243 lb)  Physical Exam   Eyes: Pupils are equal, round, and reactive to light  EOM are normal    Neck: Normal carotid pulses present  Carotid bruit is not present  Neurological: She has normal strength and normal reflexes  Psychiatric: Her speech is normal        Neurological Exam  Mental Status   Oriented to person, place, time and situation  Recent and remote memory are intact  Speech is normal  Language is fluent with no aphasia  Cranial Nerves  CN II: Visual fields full to confrontation  CN III, IV, VI: Extraocular movements intact bilaterally  Pupils equal round and reactive to light bilaterally  CN V: Facial sensation is normal   CN VII: Full and symmetric facial movement  CN VIII: Hearing is normal   CN IX, X: Palate elevates symmetrically  Normal gag reflex  CN XI: Shoulder shrug strength is normal   CN XII: Tongue midline without atrophy or fasciculations  Motor  Normal muscle bulk throughout  No fasciculations present  Normal muscle tone  Strength is 5/5 throughout all four extremities  Sensory  Sensation is intact to light touch, pinprick, vibration and proprioception in all four extremities  Light touch is normal in upper and lower extremities  Pinprick is normal in upper and lower extremities  Proprioception is normal in upper and lower extremities  Reflexes  Deep tendon reflexes are 2+ and symmetric in all four extremities with downgoing toes bilaterally  Coordination  Right: Finger-to-nose normal  Heel-to-shin normal   Left: Finger-to-nose normal  Heel-to-shin normal     Gait  Normal casual, toe, heel and tandem gait  ROS:    Review of Systems   Constitutional: Negative  HENT: Negative  Eyes: Negative  Respiratory: Negative  Cardiovascular: Negative      Gastrointestinal: Negative  Endocrine: Negative  Genitourinary: Negative  Musculoskeletal: Negative  Skin: Negative  Allergic/Immunologic: Negative  Neurological: Negative  Hematological: Negative  Psychiatric/Behavioral: Negative

## 2019-02-15 ENCOUNTER — OFFICE VISIT (OUTPATIENT)
Dept: OBGYN CLINIC | Facility: CLINIC | Age: 79
End: 2019-02-15
Payer: MEDICARE

## 2019-02-15 VITALS
SYSTOLIC BLOOD PRESSURE: 110 MMHG | HEART RATE: 90 BPM | BODY MASS INDEX: 41.15 KG/M2 | HEIGHT: 64 IN | DIASTOLIC BLOOD PRESSURE: 72 MMHG | WEIGHT: 241 LBS

## 2019-02-15 DIAGNOSIS — M25.562 CHRONIC PAIN OF LEFT KNEE: ICD-10-CM

## 2019-02-15 DIAGNOSIS — M17.12 PRIMARY OSTEOARTHRITIS OF LEFT KNEE: Primary | ICD-10-CM

## 2019-02-15 DIAGNOSIS — G89.29 CHRONIC PAIN OF LEFT KNEE: ICD-10-CM

## 2019-02-15 PROCEDURE — 20610 DRAIN/INJ JOINT/BURSA W/O US: CPT | Performed by: ORTHOPAEDIC SURGERY

## 2019-02-15 NOTE — PROGRESS NOTES
Assessment/Plan:  1  Primary osteoarthritis of left knee  Large joint arthrocentesis: L knee   2  Chronic pain of left knee       Patient is here for her 2nd Synvisc injection for her left knee  She has been tolerating the series of injections well  She tolerated today's injection well  Post-injection instructions were provided  She will return next week for her 3rd injection for her left knee  We are still awaiting arrival for her right knee Synvisc injection series  Large joint arthrocentesis: L knee  Date/Time: 2/15/2019 11:42 AM  Consent given by: patient  Site marked: site marked  Timeout: Immediately prior to procedure a time out was called to verify the correct patient, procedure, equipment, support staff and site/side marked as required   Supporting Documentation  Indications: pain   Procedure Details  Location: knee - L knee  Preparation: Patient was prepped and draped in the usual sterile fashion  Needle size: 20 G  Ultrasound guidance: no  Approach: anterolateral  Medications administered: 16 mg hylan 16 MG/2ML    Patient tolerance: patient tolerated the procedure well with no immediate complications  Dressing:  Sterile dressing applied          Subjective:  Left knee Synvisc injection #2     Patient ID: Ankit Resendez is a 70 y o  female  HPI  Patient is here for her 2nd Synvisc injection for her left knee  She has been tolerating the series of injections well  Review of Systems   Constitutional: Positive for activity change  HENT: Negative  Eyes: Negative  Respiratory: Negative  Cardiovascular: Negative  Gastrointestinal: Negative  Endocrine: Negative  Musculoskeletal: Positive for arthralgias  Skin: Negative  Neurological: Negative  Psychiatric/Behavioral: Negative            Past Medical History:   Diagnosis Date    [facility] fibrillation ([facility] )     Carrero's esophagus     last assessed: 1/23/2018    Breast cancer ([facility] [address] )     stage 1 (left), given adjuvant radiation with Arimidex x 5 years    Cancer [facility])     Left Breast, Lumpectomy    Cataract     last assessed: 3/11/2014    Dysplasia of toenail     last assessed: 8/29/2017    Esophageal reflux     GERD (gastroesophageal reflux disease)     Gross hematuria     last assessed: 2/19/2015    Hematuria     Hiatal hernia     History of radiation therapy     Hypertension     Irregular heart beat     AFIB    Mixed sensory-motor polyneuropathy     Neuropathy     Obesity     Pacemaker     Paroxysmal atrial fibrillation (HCC)     Peripheral neuropathy     Rectal bleeding     Restless leg syndrome     Shortness of breath     last assessed: 1/11/2016       Past Surgical History:   Procedure Laterality Date    BREAST BIOPSY      BREAST LUMPECTOMY Left     onset: 2006    BREAST SURGERY      CARDIAC PACEMAKER PLACEMENT      x 3 2006    CATARACT EXTRACTION      COLONOSCOPY      CYSTOSCOPY  04/04/2014    diagnostic    HYSTERECTOMY      ALISON BSO; due to fibroid uterus; age 38   [facility] KNEE CARTILAGE SURGERY      excision lesion of meniscus or capsule knee    KNEE SURGERY      OOPHORECTOMY Bilateral     OTHER SURGICAL HISTORY      radiation therapy    IA COLONOSCOPY FLX DX W/COLLJ SPEC WHEN PFRMD N/A 2/8/2017    Procedure: COLONOSCOPY;  Surgeon: Precious Brooks MD;  [facility]: [facility]; Service: Gastroenterology    IA ESOPHAGOGASTRODUODENOSCOPY TRANSORAL DIAGNOSTIC N/A 9/20/2017    Procedure: ESOPHAGOGASTRODUODENOSCOPY (EGD); Surgeon: Juliet Obrien MD;  Location: [facility];   Service: Gastroenterology    UPPER GASTROINTESTINAL ENDOSCOPY         Family History   Problem Relation Age of Onset    Hypertension Mother     Heart disease Father     Aneurysm Father     Coronary artery disease Father         in his 76s with aneurysm    Aortic aneurysm Father         abdominal    Scleroderma Sister     Breast cancer Sister 76    Hypertension Sister     No Known Problems Son     Testicular cancer Son     Thyroid cancer Son     Colon cancer Maternal Aunt     Colon cancer Maternal Aunt        Social History     Occupational History    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      Comment: retired   Tobacco Use    Smoking status: Former Smoker     Years: 25 00     Last attempt to quit: 1980     Years since quittin 4    Smokeless tobacco: Never Used    Tobacco comment: Quit over 30 years ago; quit age 41   Substance and Sexual Activity    Alcohol use: Not Currently    Drug use: No    Sexual activity: Not on file         Current Outpatient Medications:     Calcium Acetate, Phos Binder, (CALCIUM ACETATE PO), Take by mouth daily  , Disp: , Rfl:     Cholecalciferol (VITAMIN D3) 5000 units CAPS, Take by mouth, Disp: , Rfl:     clobetasol (TEMOVATE) 0 05 % cream, Apply topically once a week  , Disp: , Rfl:     furosemide (LASIX) 40 mg tablet, TAKE 1 TABLET(40 MG) BY MOUTH DAILY AS NEEDED FOR SWELLING (Patient taking differently: 20 mg oral daily), Disp: 90 tablet, Rfl: 0    gabapentin (NEURONTIN) 800 mg tablet, Take 1 tablet (800 mg total) by mouth 4 (four) times a day, Disp: 360 tablet, Rfl: 1    lisinopril (ZESTRIL) 40 mg tablet, TAKE 1 TABLET BY MOUTH  EVERY DAY AS DIRECTED, Disp: 90 tablet, Rfl: 3    metoprolol tartrate (LOPRESSOR) 50 mg tablet, 75 mg 2 (two) times a day , Disp: , Rfl: 0    Metoprolol Tartrate 75 MG TABS, Take 1 tablet (75 mg total) by mouth 2 (two) times a day, Disp: 60 tablet, Rfl: 0    multivitamin (THERAGRAN) TABS, Take 1 tablet by mouth daily, Disp: , Rfl:     pantoprazole (PROTONIX) 40 mg tablet, Take 1 tablet (40 mg total) by mouth daily for 30 days, Disp: 30 tablet, Rfl: 0    pramipexole (MIRAPEX) 0 5 mg tablet, One and half tablet at 5:00 p m  and 10:00 p m , Disp: 270 tablet, Rfl: 0    Probiotic Product (PROBIOTIC PO), Take by mouth, Disp: , Rfl:     warfarin (COUMADIN) 5 mg tablet, TAKE 1 AND 1/2 TABLETS TO 2 TABLETS BY MOUTH DAILY OR  AS ORDERED BY PHYSICIAN, Disp: 180 tablet, Rfl: 3    Allergies   Allergen Reactions    Cephalexin Rash    Duloxetine Hcl Other (See Comments)     Facial pins and needles sensation    Erythromycin Rash    Levofloxacin Other (See Comments)     Muscular aches    Penicillins Rash    Savella [Milnacipran] Rash    Sulfa Antibiotics Rash       Objective:  Vitals:    02/15/19 1123   BP: 110/72   Pulse: 90       Body mass index is 41 37 kg/m²  Left Knee Exam     Muscle Strength   The patient has normal left knee strength  Tenderness   The patient is experiencing tenderness in the lateral joint line, patella and medial joint line  Range of Motion   Extension: 0   Left knee flexion: 115°     Tests   Ned:  Medial - negative Lateral - negative  Varus: negative Valgus: negative  Lachman:  Anterior - negative      Drawer:  Anterior - negative       Patellar apprehension: negative    Other   Erythema: absent  Scars: absent  Sensation: normal  Pulse: present  Swelling: none  Effusion: no effusion present    Comments:  Crepitance on AROM and PROM-parapatellar  - PFG  No increased warmth of knee  Knee is stable at 0, 30, 90 degrees  Grossly distally NVI  Thigh and calf soft and non tender          Observations   Left Knee   Negative for effusion  Physical Exam   Musculoskeletal:        Left knee: She exhibits no effusion  Nursing note and vitals reviewed  Jovanna MELENDEZ    Division of Adult Reconstruction  Department of [facility]

## 2019-02-21 ENCOUNTER — OFFICE VISIT (OUTPATIENT)
Dept: CARDIOLOGY CLINIC | Facility: CLINIC | Age: 79
End: 2019-02-21
Payer: MEDICARE

## 2019-02-21 VITALS
HEIGHT: 64 IN | WEIGHT: 240.7 LBS | OXYGEN SATURATION: 98 % | SYSTOLIC BLOOD PRESSURE: 128 MMHG | DIASTOLIC BLOOD PRESSURE: 80 MMHG | HEART RATE: 104 BPM | BODY MASS INDEX: 41.09 KG/M2

## 2019-02-21 DIAGNOSIS — I48.91 ATRIAL FIBRILLATION, UNSPECIFIED TYPE (HCC): Primary | ICD-10-CM

## 2019-02-21 DIAGNOSIS — I10 HTN (HYPERTENSION): ICD-10-CM

## 2019-02-21 PROCEDURE — 93000 ELECTROCARDIOGRAM COMPLETE: CPT | Performed by: INTERNAL MEDICINE

## 2019-02-21 PROCEDURE — 99214 OFFICE O/P EST MOD 30 MIN: CPT | Performed by: INTERNAL MEDICINE

## 2019-02-21 RX ORDER — FUROSEMIDE 40 MG/1
40 TABLET ORAL DAILY
Qty: 90 TABLET | Refills: 3 | Status: SHIPPED | OUTPATIENT
Start: 2019-02-21 | End: 2019-05-15 | Stop reason: SDUPTHER

## 2019-02-21 RX ORDER — LISINOPRIL 40 MG/1
40 TABLET ORAL DAILY
Qty: 90 TABLET | Refills: 3 | Status: SHIPPED | OUTPATIENT
Start: 2019-02-21 | End: 2020-02-25 | Stop reason: SDUPTHER

## 2019-02-21 NOTE — PROGRESS NOTES
Cardiology Follow Up    [facility]  1940  [id number]  [facility] [id number] [facility]  [phone]    1  Atrial fibrillation, unspecified type ([facility] )  POCT ECG       Interval History: Follow up Atrial Fibrillation  November hospitalization was reviewed  She had a negative stress test in the hospital      She has no chest pain, no dyspnea and no palpitations  Problem List     Hiatal hernia    Atrial fibrillation ([facility] ) [I48 [id number]]    Overview Signed 3/10/2018  8:59 AM by Meenu Bhandari MD     Description: s/p 2 unsuccessful ablation, s/p 2 cardioversion last in 2010, s/p pacemaker  ; on anticoagulation followed by cardiology         Acquired deformity of foot    Corns    Diabetic polyneuropathy associated with type 2 diabetes mellitus ([facility] 75 )    Overview Signed 3/12/2018 12:21 PM by Meenu Bhandari MD     ?duloxetine         Lab Results   Component Value Date    HGBA1C 6 0 12/10/2018       No results for input(s): POCGLU in the last 72 hours  Blood Sugar Average: Last 72 hrs:          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HCC)    Radiculopathy of lumbar region    Primary osteoarthritis of both knees    RLS (restless legs syndrome)    Arthritis    Esophageal reflux    Essential hypertension    Lichen sclerosus et atrophicus    Multiple lung nodules    Overview Addendum 3/12/2018 12:24 PM by Meenu Bhandari MD     Stable, no further CT           Class 3 severe obesity due to excess calories with serious comorbidity and body mass index (BMI) of 40 0 to 44 9 in adult [facility])    Osteoarthritis of left knee    Osteopenia of multiple sites    Peripheral neuropathy    Overview Signed 3/10/2018  8:59 AM by Meenu Bhandari MD     Transitioned From: Neuropathy         Vitamin D deficiency    Dense breast tissue on mammogram    Difficulty walking    Flat foot    Onychomycosis    Carrero's esophagus with dysplasia    Asthmatic bronchitis    Chronic pain of left knee    Mild cognitive impairment    Pain in both feet    Epigastric abdominal pain    Pacemaker    GERD (gastroesophageal reflux disease)    Supratherapeutic INR    Pedal edema        Past Medical History:   Diagnosis Date    Atrial fibrillation ([facility] )     Carrero's esophagus     last assessed: 2018    Breast cancer ([facility] )     stage 1 (left), given adjuvant radiation with Arimidex x 5 years    Cancer [facility])     Left Breast, Lumpectomy    Cataract     last assessed: 3/11/2014    Dysplasia of toenail     last assessed: 2017    Esophageal reflux     GERD (gastroesophageal reflux disease)     Gross hematuria     last assessed: 2015    Hematuria     Hiatal hernia     History of radiation therapy     Hypertension     Irregular heart beat     AFIB    Mixed sensory-motor polyneuropathy     Neuropathy     Obesity     Pacemaker     Paroxysmal atrial fibrillation (HCC)     Peripheral neuropathy     Rectal bleeding     Restless leg syndrome     Shortness of breath     last assessed: 2016     Social History     Socioeconomic History    Marital status: /Civil Union     Spouse name: Not on file    Number of children: Not on file    Years of education: Not on file    Highest education level: Not on file   Occupational History    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      Comment: retired   Social Needs    Financial resource strain: Not on file    Food insecurity:     Worry: Not on file     Inability: Not on file   [employer] needs:     Medical: Not on file     Non-medical: Not on file   Tobacco Use    Smoking status: Former Smoker     Years: 25      Last attempt to quit: 1980     Years since quittin 4    Smokeless tobacco: Never Used    Tobacco comment: Quit over 30 years ago; quit age 41   Substance and Sexual Activity    Alcohol use: Not Currently    Drug use:  No  Sexual activity: Not on file   Lifestyle    Physical activity:     Days per week: Not on file     Minutes per session: Not on file    Stress: Not on file   Relationships    Social connections:     Talks on phone: Not on file     Gets together: Not on file     Attends Gnosticism service: Not on file     Active member of club or organization: Not on file     Attends meetings of clubs or organizations: Not on file     Relationship status: Not on file    Intimate partner violence:     Fear of current or ex partner: Not on file     Emotionally abused: Not on file     Physically abused: Not on file     Forced sexual activity: Not on file   Other Topics Concern    Not on file   Social History Narrative          Family History   Problem Relation Age of Onset    Hypertension Mother     Heart disease Father     Aneurysm Father     Coronary artery disease Father         in his 76s with aneurysm    Aortic aneurysm Father         abdominal    Scleroderma Sister     Breast cancer Sister 76    Hypertension Sister     No Known Problems Son     Testicular cancer Son     Thyroid cancer Son     Colon cancer Maternal Aunt     Colon cancer Maternal Aunt      Past Surgical History:   Procedure Laterality Date    BREAST BIOPSY      BREAST LUMPECTOMY Left     onset: 2006    BREAST SURGERY      CARDIAC PACEMAKER PLACEMENT      x 3 2006    CATARACT EXTRACTION      COLONOSCOPY      CYSTOSCOPY  04/04/2014    diagnostic    HYSTERECTOMY      ALISON BSO; due to fibroid uterus; age 38   Preeti Pall KNEE CARTILAGE SURGERY      excision lesion of meniscus or capsule knee    KNEE SURGERY      OOPHORECTOMY Bilateral     OTHER SURGICAL HISTORY      radiation therapy    NC COLONOSCOPY FLX DX W/COLLJ SPEC WHEN PFRMD N/A 2/8/2017    Procedure: COLONOSCOPY;  Surgeon: Josh Trotter MD;  [facility];   Service: Gastroenterology    NC ESOPHAGOGASTRODUODENOSCOPY TRANSORAL DIAGNOSTIC N/A 9/20/2017    Procedure: ESOPHAGOGASTRODUODENOSCOPY (EGD); Surgeon: Judson Dodge MD;  Location:  GI LAB;   Service: Gastroenterology    UPPER GASTROINTESTINAL ENDOSCOPY         Current Outpatient Medications:     Calcium Acetate, Phos Binder, (CALCIUM ACETATE PO), Take by mouth daily  , Disp: , Rfl:     Cholecalciferol (VITAMIN D3) 5000 units CAPS, Take by mouth daily , Disp: , Rfl:     clobetasol (TEMOVATE) 0 05 % cream, Apply topically once a week  , Disp: , Rfl:     furosemide (LASIX) 40 mg tablet, TAKE 1 TABLET(40 MG) BY MOUTH DAILY AS NEEDED FOR SWELLING (Patient taking differently: 40 mg oral daily), Disp: 90 tablet, Rfl: 0    gabapentin (NEURONTIN) 800 mg tablet, Take 1 tablet (800 mg total) by mouth 4 (four) times a day, Disp: 360 tablet, Rfl: 1    lisinopril (ZESTRIL) 40 mg tablet, TAKE 1 TABLET BY MOUTH  EVERY DAY AS DIRECTED, Disp: 90 tablet, Rfl: 3    Metoprolol Tartrate 75 MG TABS, Take 1 tablet (75 mg total) by mouth 2 (two) times a day, Disp: 60 tablet, Rfl: 0    multivitamin (THERAGRAN) TABS, Take 1 tablet by mouth daily, Disp: , Rfl:     pramipexole (MIRAPEX) 0 5 mg tablet, One and half tablet at 5:00 p m  and 10:00 p m , Disp: 270 tablet, Rfl: 0    Probiotic Product (PROBIOTIC PO), Take by mouth daily , Disp: , Rfl:     warfarin (COUMADIN) 5 mg tablet, TAKE 1 AND 1/2 TABLETS TO 2 TABLETS BY MOUTH DAILY OR  AS ORDERED BY PHYSICIAN, Disp: 180 tablet, Rfl: 3  Allergies   Allergen Reactions    Cephalexin Rash    Duloxetine Hcl Other (See Comments)     Facial pins and needles sensation    Erythromycin Rash    Levofloxacin Other (See Comments)     Muscular aches    Penicillins Rash    Savella [Milnacipran] Rash    Sulfa Antibiotics Rash       Labs:     Chemistry        Component Value Date/Time     11/09/2015 1115    K 4 2 12/10/2018 1326    K 4 5 11/09/2015 1115     12/10/2018 1326     11/09/2015 1115    CO2 30 12/10/2018 1326    CO2 30 (H) 11/09/2015 1115    BUN 44 (H) 12/10/2018 1326 BUN 20 11/09/2015 1115    CREATININE 1 16 12/10/2018 1326    CREATININE 0 8 11/09/2015 1115        Component Value Date/Time    CALCIUM 9 9 12/10/2018 1326    CALCIUM 9 0 11/09/2015 1115    ALKPHOS 46 11/27/2018 1850    ALKPHOS 61 11/09/2015 1115    AST 19 11/27/2018 1850    AST 23 11/09/2015 1115    ALT 42 11/27/2018 1850    ALT 31 11/09/2015 1115    BILITOT 0 5 11/09/2015 1115            Lab Results   Component Value Date    CHOL 162 11/09/2015     Lab Results   Component Value Date    HDL 49 11/28/2018    HDL 40 03/06/2018    HDL 41 02/25/2017     Lab Results   Component Value Date    LDLCALC 124 (H) 11/28/2018    LDLCALC 88 03/06/2018    LDLCALC 84 02/25/2017     Lab Results   Component Value Date    TRIG 91 11/28/2018    TRIG 110 03/06/2018    TRIG 111 02/25/2017     No results found for: CHOLHDL    Imaging: No results found  EKG: Atrial Fibrillation  Nonspecific T wave abnormality  Review of Systems   Constitution: Negative  HENT: Negative  Eyes: Negative  Cardiovascular: Positive for leg swelling  Respiratory: Negative  Endocrine: Negative  Hematologic/Lymphatic: Negative  Skin: Negative  Musculoskeletal: Negative  Gastrointestinal: Negative  Genitourinary: Negative  Neurological: Negative  Psychiatric/Behavioral: Negative  Allergic/Immunologic: Negative  Vitals:    02/21/19 1556   BP: 128/80   Pulse: 104   SpO2: 98%           Physical Exam   Constitutional: She is oriented to person, place, and time  Non-toxic appearance  She does not appear ill  Neck: No JVD present  Cardiovascular: Normal rate  An irregularly irregular rhythm present  Pulmonary/Chest: Effort normal and breath sounds normal  No accessory muscle usage  No respiratory distress  She has no decreased breath sounds  She has no wheezes  She has no rales  Abdominal: Soft  Bowel sounds are normal  She exhibits no distension  There is no tenderness     Musculoskeletal:        Right lower leg: She exhibits no edema  Neurological: She is alert and oriented to person, place, and time  Skin: Skin is warm and dry  Psychiatric: She has a normal mood and affect  Her behavior is normal        Discussion/Summary:    1  Chronic Atrial Fibrillation: Overall doing well  Continue Metoprolol  Stable on Coumadin  Her resting heart rate has been stable and she checks at home   Overall doing well       2  Hypertension: Her BP is well controlled  Continue current medical therapy        3  s/p PPM: Continue device checks  The patient was counseled regarding diagnostic results, instructions for management, risk factor reductions, impressions  total time of encounter was 25 minutes and 15 minutes was spent counseling

## 2019-02-22 ENCOUNTER — OFFICE VISIT (OUTPATIENT)
Dept: OBGYN CLINIC | Facility: CLINIC | Age: 79
End: 2019-02-22
Payer: MEDICARE

## 2019-02-22 VITALS
DIASTOLIC BLOOD PRESSURE: 86 MMHG | BODY MASS INDEX: 41.15 KG/M2 | WEIGHT: 241 LBS | SYSTOLIC BLOOD PRESSURE: 125 MMHG | HEART RATE: 91 BPM | HEIGHT: 64 IN

## 2019-02-22 DIAGNOSIS — E66.01 CLASS 3 SEVERE OBESITY DUE TO EXCESS CALORIES WITH SERIOUS COMORBIDITY AND BODY MASS INDEX (BMI) OF 40.0 TO 44.9 IN ADULT (HCC): ICD-10-CM

## 2019-02-22 DIAGNOSIS — M25.562 CHRONIC PAIN OF BOTH KNEES: ICD-10-CM

## 2019-02-22 DIAGNOSIS — G89.29 CHRONIC PAIN OF BOTH KNEES: ICD-10-CM

## 2019-02-22 DIAGNOSIS — M17.0 PRIMARY OSTEOARTHRITIS OF BOTH KNEES: Primary | ICD-10-CM

## 2019-02-22 DIAGNOSIS — M25.561 CHRONIC PAIN OF BOTH KNEES: ICD-10-CM

## 2019-02-22 PROCEDURE — 20610 DRAIN/INJ JOINT/BURSA W/O US: CPT | Performed by: PHYSICIAN ASSISTANT

## 2019-02-22 NOTE — PROGRESS NOTES
Assessment/Plan:  1  Primary osteoarthritis of both knees  Large joint arthrocentesis   2  Chronic pain of both knees  Large joint arthrocentesis   3  Class 3 severe obesity due to excess calories with serious comorbidity and body mass index (BMI) of 40 0 to 44 9 in adult [facility])       Jannette Infante is a pleasant 79-year-old female with bilateral knee osteoarthritis and activity related knee pain  She consented to and underwent the 3rd and final Synvisc injection for her left knee today in the 1st Synvisc injection for her right knee today  She tolerated these injections well without complication  Post injection instructions were provided  She will return next week to continue the series in her right knee  All of her questions were addressed today  Large joint arthrocentesis: bilateral knee  Date/Time: 2/22/2019 1:13 PM  Consent given by: patient  Site marked: site marked  Timeout: Immediately prior to procedure a time out was called to verify the correct patient, procedure, equipment, support staff and site/side marked as required   Supporting Documentation  Indications: pain   Procedure Details  Location: knee - bilateral knee  Preparation: Patient was prepped and draped in the usual sterile fashion  Needle size: 20 G  Ultrasound guidance: no  Approach: anterolateral    Medications (Right): 16 mg hylan 16 MG/2MLMedications (Left): 16 mg hylan 16 MG/2ML   Patient tolerance: patient tolerated the procedure well with no immediate complications  Dressing:  Sterile dressing applied            Subjective: Left knee Synvisc #3, right knee Synvisc #1    Patient ID: Jessica Zepeda is a 70 y o  female  Jannette Infante is a pleasant 79-year-old female presenting today for her bilateral pain and osteoarthritis  She has seen benefit from the 1st 2 Synvisc injections for her left knee and is here for the 3rd and final injection in the left knee in the 1st injection of Synvisc in the right knee    She denies any adverse effects  Review of Systems   Constitutional: Negative  HENT: Negative  Eyes: Negative  Respiratory: Negative  Cardiovascular: Negative  Gastrointestinal: Negative  Endocrine: Negative  Genitourinary: Negative  Musculoskeletal: Positive for arthralgias  Skin: Negative  Allergic/Immunologic: Negative  Neurological: Negative  Hematological: Negative  Psychiatric/Behavioral: Negative  Past Medical History:   Diagnosis Date    Atrial fibrillation ([facility] )     Carrero's esophagus     last assessed: 1/23/2018    Breast cancer ([facility] )     stage 1 (left), given adjuvant radiation with Arimidex x 5 years    Cancer [facility])     Left Breast, Lumpectomy    Cataract     last assessed: 3/11/2014    Dysplasia of toenail     last assessed: 8/29/2017    Esophageal reflux     GERD (gastroesophageal reflux disease)     Gross hematuria     last assessed: 2/19/2015    Hematuria     Hiatal hernia     History of radiation therapy     Hypertension     Irregular heart beat     AFIB    Mixed sensory-motor polyneuropathy     Neuropathy     Obesity     Pacemaker     Paroxysmal atrial fibrillation (HCC)     Peripheral neuropathy     Rectal bleeding     Restless leg syndrome     Shortness of breath     last assessed: 1/11/2016       Past Surgical History:   Procedure Laterality Date    BREAST BIOPSY      BREAST LUMPECTOMY Left     onset: 2006    BREAST SURGERY      CARDIAC PACEMAKER PLACEMENT      x 3 2006    CATARACT EXTRACTION      COLONOSCOPY      CYSTOSCOPY  04/04/2014    diagnostic    HYSTERECTOMY      ALISON BSO; due to fibroid uterus; age 38   [facility] KNEE CARTILAGE SURGERY      excision lesion of meniscus or capsule knee    KNEE SURGERY      OOPHORECTOMY Bilateral     OTHER SURGICAL HISTORY      radiation therapy    MA COLONOSCOPY FLX DX W/COLLJ SPEC WHEN PFRMD N/A 2/8/2017    Procedure: COLONOSCOPY;  Surgeon: Juan Pablo Mckeon MD;  [facility];   Service: Gastroenterology    MO ESOPHAGOGASTRODUODENOSCOPY TRANSORAL DIAGNOSTIC N/A 2017    Procedure: ESOPHAGOGASTRODUODENOSCOPY (EGD); Surgeon: Andrea Ruff MD;  [facility];   Service: Gastroenterology    UPPER GASTROINTESTINAL ENDOSCOPY         Family History   Problem Relation Age of Onset    Hypertension Mother     Heart disease Father     Aneurysm Father     Coronary artery disease Father         in his 76s with aneurysm    Aortic aneurysm Father         abdominal    Scleroderma Sister     Breast cancer Sister 76    Hypertension Sister     No Known Problems Son     Testicular cancer Son     Thyroid cancer Son     Colon cancer Maternal Aunt     Colon cancer Maternal Aunt        Social History     Occupational History    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      Comment: retired   Tobacco Use    Smoking status: Former Smoker     Years: 25 00     Last attempt to quit: 1980     Years since quittin 4    Smokeless tobacco: Never Used    Tobacco comment: Quit over 30 years ago; quit age 41   Substance and Sexual Activity    Alcohol use: Not Currently    Drug use: No    Sexual activity: Not on file         Current Outpatient Medications:     Calcium Acetate, Phos Binder, (CALCIUM ACETATE PO), Take by mouth daily  , Disp: , Rfl:     Cholecalciferol (VITAMIN D3) 5000 units CAPS, Take by mouth daily , Disp: , Rfl:     clobetasol (TEMOVATE) 0 05 % cream, Apply topically once a week  , Disp: , Rfl:     furosemide (LASIX) 40 mg tablet, Take 1 tablet (40 mg total) by mouth daily, Disp: 90 tablet, Rfl: 3    gabapentin (NEURONTIN) 800 mg tablet, Take 1 tablet (800 mg total) by mouth 4 (four) times a day, Disp: 360 tablet, Rfl: 1    lisinopril (ZESTRIL) 40 mg tablet, Take 1 tablet (40 mg total) by mouth daily As directed, Disp: 90 tablet, Rfl: 3    Metoprolol Tartrate 75 MG TABS, Take 1 tablet (75 mg total) by mouth 2 (two) times a day, Disp: 60 tablet, Rfl: 0   multivitamin (THERAGRAN) TABS, Take 1 tablet by mouth daily, Disp: , Rfl:     pramipexole (MIRAPEX) 0 5 mg tablet, One and half tablet at 5:00 p m  and 10:00 p m , Disp: 270 tablet, Rfl: 0    Probiotic Product (PROBIOTIC PO), Take by mouth daily , Disp: , Rfl:     warfarin (COUMADIN) 5 mg tablet, TAKE 1 AND 1/2 TABLETS TO 2 TABLETS BY MOUTH DAILY OR  AS ORDERED BY PHYSICIAN, Disp: 180 tablet, Rfl: 3    Allergies   Allergen Reactions    Cephalexin Rash    Duloxetine Hcl Other (See Comments)     Facial pins and needles sensation    Erythromycin Rash    Levofloxacin Other (See Comments)     Muscular aches    Penicillins Rash    Savella [Milnacipran] Rash    Sulfa Antibiotics Rash       Objective:  Vitals:    02/22/19 1202   BP: 125/86   Pulse: 91       Body mass index is 41 37 kg/m²  Right Knee Exam     Muscle Strength   The patient has normal right knee strength  Tenderness   The patient is experiencing tenderness in the lateral joint line, patella and medial joint line  Range of Motion   Extension: 0   Flexion: 120     Tests   Ned:  Medial - negative Lateral - negative  Varus: negative Valgus: negative  Lachman:  Anterior - negative      Drawer:  Anterior - negative      Patellar apprehension: negative    Other   Erythema: absent  Scars: absent  Sensation: normal  Pulse: present  Swelling: none  Effusion: no effusion present    Comments:  Crepitance on AROM and PROM-parapatellar  - PFG  No increased warmth of knee  Knee is stable at 0, 30, 90 degrees  Grossly distally NVI  Thigh and calf soft and non tender      Left Knee Exam     Muscle Strength   The patient has normal left knee strength  Tenderness   The patient is experiencing tenderness in the lateral joint line, patella and medial joint line      Range of Motion   Extension: 0   Left knee flexion: 115°     Tests   Ned:  Medial - negative Lateral - negative  Varus: negative Valgus: negative  Lachman:  Anterior - negative Drawer:  Anterior - negative       Patellar apprehension: negative    Other   Erythema: absent  Scars: absent  Sensation: normal  Pulse: present  Swelling: none  Effusion: no effusion present    Comments:  Crepitance on AROM and PROM-parapatellar  - PFG  No increased warmth of knee  Knee is stable at 0, 30, 90 degrees  Grossly distally NVI  Thigh and calf soft and non tender          Observations   Left Knee   Negative for effusion  Right Knee   Negative for effusion  Physical Exam   Constitutional: She is oriented to person, place, and time  She appears well-developed and well-nourished  There is no height or weight on file to calculate BMI  HENT:   Head: Normocephalic and atraumatic  Eyes: EOM are normal    Neck: Normal range of motion  Cardiovascular: Intact distal pulses  Pulmonary/Chest: Effort normal    Musculoskeletal:        Right knee: She exhibits no effusion  Left knee: She exhibits no effusion  See ortho exam   Neurological: She is alert and oriented to person, place, and time  Skin: Skin is warm and dry  Psychiatric: She has a normal mood and affect   Her behavior is normal  Judgment and thought content normal

## 2019-02-27 ENCOUNTER — TRANSCRIBE ORDERS (OUTPATIENT)
Dept: ADMINISTRATIVE | Facility: HOSPITAL | Age: 79
End: 2019-02-27

## 2019-02-27 ENCOUNTER — OFFICE VISIT (OUTPATIENT)
Dept: PODIATRY | Facility: CLINIC | Age: 79
End: 2019-02-27
Payer: MEDICARE

## 2019-02-27 ENCOUNTER — ANTICOAG VISIT (OUTPATIENT)
Dept: CARDIOLOGY CLINIC | Facility: CLINIC | Age: 79
End: 2019-02-27

## 2019-02-27 ENCOUNTER — APPOINTMENT (OUTPATIENT)
Dept: LAB | Facility: HOSPITAL | Age: 79
End: 2019-02-27
Payer: MEDICARE

## 2019-02-27 VITALS
HEART RATE: 77 BPM | HEIGHT: 64 IN | WEIGHT: 241 LBS | RESPIRATION RATE: 17 BRPM | BODY MASS INDEX: 41.15 KG/M2 | DIASTOLIC BLOOD PRESSURE: 86 MMHG | SYSTOLIC BLOOD PRESSURE: 125 MMHG

## 2019-02-27 DIAGNOSIS — L84 CORNS: ICD-10-CM

## 2019-02-27 DIAGNOSIS — I48.20 CHRONIC ATRIAL FIBRILLATION (HCC): ICD-10-CM

## 2019-02-27 DIAGNOSIS — I70.209 PERIPHERAL ARTERIOSCLEROSIS (HCC): ICD-10-CM

## 2019-02-27 DIAGNOSIS — M79.672 PAIN IN BOTH FEET: ICD-10-CM

## 2019-02-27 DIAGNOSIS — M79.671 PAIN IN BOTH FEET: ICD-10-CM

## 2019-02-27 DIAGNOSIS — E11.42 DIABETIC POLYNEUROPATHY ASSOCIATED WITH TYPE 2 DIABETES MELLITUS (HCC): Primary | ICD-10-CM

## 2019-02-27 PROCEDURE — 11056 PARNG/CUTG B9 HYPRKR LES 2-4: CPT | Performed by: PODIATRIST

## 2019-02-27 NOTE — PROGRESS NOTES
Procedures   Foot Exam        Assessment/Plan:  Painful calluses   Diabetic neuropathy   Peripheral vascular disease      Plan   Diabetic foot exam performed   All mycotic nails debrided   Plantar calluses debrided without pain or complication      Diagnoses and all orders for this visit:     Diabetic polyneuropathy associated with type 2 diabetes mellitus ([facility] [address] )     Corns     Pain in both feet     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[facility] [address] )       Discussion/Summary  The patient was counseled regarding instructions for management,-- prognosis,-- patient and family education,-- risks and benefits of treatment options,-- importance of compliance with treatment  Patient is able to Self-Care  Possible side effects of new medications were reviewed with the patient/guardian today  The treatment plan was reviewed with the patient/guardian  The patient/guardian understands and agrees with the treatment plan      Chief Complaint  Routine nail care      History of Present Illness  HPI: Patient is doing well with Cymbalta however she began have facial tingling  She discontinued medicine   However the medication was relieving her neuropathic pain       Review of Systems     ROS reviewed       Active Problems     1  Achilles tendinitis of left lower extremity (726 71) (M76 62)   2  Acquired ankle/foot deformity (736 70) (M21 969)   3  AF (paroxysmal atrial fibrillation) (427 31) (I48 0)   4  Anticoagulant long-term use (V58 61) (Z79 01)   5  Arthritis (716 90) (M19 90)   6  At risk for bone density loss (V49 89) (Z91 89)   7  Atrial fibrillation (427 31) (I48 91)   8  History of Breast Cancer (V10 3)   9  Difficulty walking (719 7) (R26 2)   10  Encounter for screening mammogram for breast cancer (V76 12) (Z12 31)   11  Esophageal reflux (530 81) (K21 9)   12  Foot pain, bilateral (729 5) (M79 671,M79 672)   13  Hiatal hernia (553 3) (K44 9)   14  History of Carrero's esophagus (V12 79) (Z87 19)   15  History of fall (V15 88) (Z91 81)   16  Hypertension (401 9) (I10)   17  Leg pain, left (729 5) (S23 886)   18  Lichen sclerosus et atrophicus (701 0) (L90 0)   19  Lumbar radiculopathy (724 4) (M54 16)   20  Multiple lung nodules (793 19) (R91 8)   21  Obesity (278 00) (E66 9)   22  Onychomycosis (110 1) (B35 1)   23  Osteopenia (733 90) (M85 80)   24  Pacemaker (V45 01) (Z95 0)   25  Peripheral neuropathy (356 9) (G62 9)   26  Pes planus of both feet (734) (M21 41,M21 42)   27  Plantar fasciitis of left foot (728 71) (M72 2)   28  Restless legs syndrome (333 94) (G25 81)     Past Medical History   · History of Abnormal glucose (790 29) (R73 09)   · Atrial fibrillation (427 31) (I48 91)   · History of Breast Cancer (V10 3)   · 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)   · Esophageal reflux (530 81) (K21 9)   · Denied: History of Exposure To STD   · History of Gross hematuria (599 71) (R31 0)   · History of Hematoma (924 9) (T14 8XXA)   · History of Hematoma of lower extremity, right, initial encounter (924 5) (S80 11XA)   · History of backache (V13 59) (Z87 39)   · History of Carrero's esophagus (V12 79) (Z87 19)   · History of cataract (V12 49) (Z86 69)   · History of chest pain (V13 89) (Z87 898)   · History of fall (V15 88) (Z91 81)   · History of hematuria (V13 09) (Z87 448)   · History of postmenopausal hormone replacement therapy (V87 49) (Z92 29)   · History of shortness of breath (V13 89) (E56 753)   · History of urinary incontinence (V13 09) (Z87 898)   · History of Neck pain (723 1) (M54 2)   · Normal delivery (650) (O80,Z37  9)   · History of Right knee pain (719 46) (M25 561)   · History of Ringing In The Ears (Tinnitus) (388 30)   · History of Skin rash (782 1) (R21)   · History of Traumatic blister of right lower extremity, initial encounter (916 2) (S82 336T)   · History of UTI (lower urinary tract infection) (599 0) (N39 0)     The active problems and past medical history were reviewed and updated today       Surgical History   · Denied: History of Abnormal Pap Smear Of Cervix   · History of Breast Surgery Lumpectomy   · History of Cataract Surgery   · History of Diagnostic Cystoscopy   · History of Excision Lesion Of Meniscus Or Capsule Knee   · History of Pacemaker - Pulse Generator Replacement   · History of Pacemaker Placement   · History of Pacemaker Placement   · History of Radiation Therapy   · History of Total Abdominal Hysterectomy With Removal Of Both Ovaries     The surgical history was reviewed and updated today        Family History  Mother    · Denied: Family history of Alcoholism and drug addiction in family   · Denied: Family history of Anxiety and depression  Father    · Denied: Family history of Alcoholism and drug addiction in family   · Denied: Family history of Anxiety and depression   · Family history of Coronary Artery Disease (V17 49)   · Family history of abdominal aortic aneurysm (V17 49) (Z82 49)  Child    · Denied: Family history of Alcoholism and drug addiction in family   · Denied: Family history of Anxiety and depression  Sibling    · Denied: Family history of Alcoholism and drug addiction in family   · Denied: Family history of Anxiety and depression  Sister    · Family history of Breast Cancer (V16 3)  Maternal Aunt    · Family history of Colon Cancer (V16 0)   · Family history of Colon Cancer (V16 0)  Family History    · Denied: Family history of Diabetes Mellitus   · Denied: Family history of Stroke Syndrome     The family history was reviewed and updated today        Social History      · Being A Social Drinker   · Denied: History of Drug Use   · Former smoker (V15 82) (R89 303)   · 25 pack years, quit age 41   · Marital History - Currently    · Retired From Work   · 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  The social history was reviewed and updated today       Current Meds   1  Clobetasol Propionate 0 05 % External Ointment; Apply to affected area twice weekly;  Therapy: 47OGV4995 to (Last Rx:25Oct2017)  Requested for: 25Oct2017 Ordered   2  Gabapentin 800 MG Oral Tablet; TAKE 1 TABLET 4 TIMES DAILY; Therapy: 01RAZ7476 to Recorded   3  Lisinopril 40 MG Oral Tablet; TAKE ONE TABLET BY MOUTH EVERY DAY AS DIRECTED; Therapy: 08KLY7260 to (Evaluate:22Mar2018)  Requested for: 27Mar2017; Last Rx:27Mar2017 Ordered   4  Metoprolol Tartrate 50 MG Oral Tablet; TAKE 1 TABLET TWICE DAILY  Requested for: 17DUE9288; Last Rx:27Mar2017 Ordered   5  Multivitamins CAPS; TAKE 1 CAPSULE DAILY; Therapy: (Recorded:11Mar2014) to Recorded   6  Pramipexole Dihydrochloride 0 5 MG Oral Tablet; TAKE 1 5 TABLETS Bedtime; Therapy: 14JSD2665 to (Last Rx:55Vkw7735) Ordered   7  Probiotic Oral Packet; Therapy: 25Oct2017 to Recorded   8  RaNITidine HCl - 150 MG Oral Tablet; TAKE 1 TABLET AT BEDTIME; Therapy: 41GHZ6120 to 0389 [id number])  Requested for: 37KMJ1784; Last Rx:30Oct2017 Ordered   9  Savella 50 MG Oral Tablet; TAKE ONE TAB TWICE DAILY; Therapy: 23IBL9670 to (Last Rx:20Nov2017)  Requested for: 20Nov2017 Ordered   10  Savella 50 MG Oral Tablet;  Therapy: 76QVE1490 to Recorded   11  Savella Titration Pack 12 5 & 25 & 50 MG Oral Miscellaneous; as directed; Alverta Eglin: 76HYX5419 to (Evaluate:10Oct2017)  Requested for: 26Sep2017; Last  Rx:55Ana1829 Ordered   12  Warfarin Sodium 5 MG Oral Tablet; take 1 to 1 1/2 tabs by mouth daily or sa directed; Alverta Eglin: 67HOJ9494 to (Evaluate:23Mar2018)  Requested for: 79PFA7940; Last  Rx:28Mar2017 Ordered     The medication list was reviewed and updated today        Allergies  1  duloxetine   2  LevoFLOXacin TABS   3  Cephalexin CAPS   4  Erythromycin Base TABS   5  Keflex TABS   6  Penicillins   7  Sulfa Drugs     Vitals        Heart Rate 78   Respiration 16   Systolic 708   Diastolic 81   Height 5 ft 4 in   Weight 233 lb    BMI Calculated 39 99   BSA Calculated 2 09      Physical Exam  Left Foot: Appearance: Normal except as noted: excessive pronation-- and-- pes planus   Tenderness: None except the lisfranc joint-- and-- medial longitudinal arch  ROM: subtalar motion was restricted  Right Foot: Appearance: Normal except as noted: excessive pronation-- and-- pes planus  Tenderness: None except the lisfranc joint-- and-- medial longitudinal arch  ROM: subtalar motion was restricted   Left Ankle: ROM: limited ROM in all planes   Right Ankle: ROM: limited ROM in all planes   Neurological Exam: performed  Light touch was decreased bilaterally  Vibratory sensation was decreased in both first metatarsophalangeal joints  Deep tendon reflexes: achilles reflex absent bilateraly-- and-- 4/5 L5 testing bilateral    Vascular Exam: performed Dorsalis pedis pulses were diminished bilaterally  Posterior tibial pulses were diminished bilaterally  Dependence rubor was present bilaterally  Capillary refill time was Negative digital hair noted, but-- between 1-3 seconds bilaterally  Toenails: All of the toenails were elongated,-- hypertrophied,-- discolored-- and--   Hyperkeratosis: present on both first toes  Shoe Gear Evaluation: performed ()  Recommendation(s): SAS style  Patient's shoes and socks removed  Right Foot/Ankle   Right Foot Inspection      Sensory   Vibration: diminished  Proprioception: diminished     Vascular  Capillary refills: elevated      Left Foot/Ankle  Left Foot Inspection                                           Sensory   Vibration: diminished  Proprioception: diminished    Vascular  Capillary refills: elevated    Assign Risk Category:  Deformity present; Loss of protective sensation; Weak pulses       Risk: 2           Procedure  Nails debrided  Bilateral preulcerative lesions debrided as well   Procedure without pain or complication

## 2019-03-01 ENCOUNTER — OFFICE VISIT (OUTPATIENT)
Dept: OBGYN CLINIC | Facility: CLINIC | Age: 79
End: 2019-03-01
Payer: MEDICARE

## 2019-03-01 VITALS
WEIGHT: 241 LBS | HEIGHT: 64 IN | BODY MASS INDEX: 41.15 KG/M2 | DIASTOLIC BLOOD PRESSURE: 64 MMHG | HEART RATE: 101 BPM | SYSTOLIC BLOOD PRESSURE: 99 MMHG

## 2019-03-01 DIAGNOSIS — M25.561 CHRONIC PAIN OF RIGHT KNEE: ICD-10-CM

## 2019-03-01 DIAGNOSIS — E66.01 CLASS 3 SEVERE OBESITY DUE TO EXCESS CALORIES WITH SERIOUS COMORBIDITY AND BODY MASS INDEX (BMI) OF 40.0 TO 44.9 IN ADULT (HCC): ICD-10-CM

## 2019-03-01 DIAGNOSIS — M17.0 PRIMARY OSTEOARTHRITIS OF BOTH KNEES: Primary | ICD-10-CM

## 2019-03-01 DIAGNOSIS — G89.29 CHRONIC PAIN OF RIGHT KNEE: ICD-10-CM

## 2019-03-01 PROCEDURE — 20610 DRAIN/INJ JOINT/BURSA W/O US: CPT | Performed by: PHYSICIAN ASSISTANT

## 2019-03-01 NOTE — PROGRESS NOTES
Assessment/Plan:  1  Primary osteoarthritis of both knees  Large joint arthrocentesis   2  Chronic pain of right knee  Large joint arthrocentesis   3  Class 3 severe obesity due to excess calories with serious comorbidity and body mass index (BMI) of 40 0 to 44 9 in adult [facility])       Samia Romero is a very pleasant 73-year-old female presenting for the 2nd of 3 Synvisc injections for her activity related right knee pain due to her severe underlying osteoarthritis  She consented to and underwent the injection as detailed below without difficulty or complication  Post injection instructions were provided  She will return next week to complete the series  Large joint arthrocentesis: R knee  Date/Time: 3/1/2019 1:13 PM  Consent given by: patient  Site marked: site marked  Timeout: Immediately prior to procedure a time out was called to verify the correct patient, procedure, equipment, support staff and site/side marked as required   Supporting Documentation  Indications: pain   Procedure Details  Location: knee - R knee  Preparation: Patient was prepped and draped in the usual sterile fashion  Needle size: 20 G  Ultrasound guidance: no  Approach: anterolateral  Medications administered: 16 mg hylan 16 MG/2ML    Patient tolerance: patient tolerated the procedure well with no immediate complications  Dressing:  Sterile dressing applied          Subjective: Right knee Synvisc #2    Patient ID: Erik Weller is a 70 y o  female  Samia Romero is a very pleasant 73-year-old female presenting today for the 2nd of 3 Synvisc injections for her right knee osteoarthritis and activity related knee pain  She has seen mild benefit from the 1st injection and did not have any adverse effects  Review of Systems   Constitutional: Negative  HENT: Negative  Eyes: Negative  Respiratory: Negative  Cardiovascular: Negative  Gastrointestinal: Negative  Endocrine: Negative  Genitourinary: Negative  Musculoskeletal: Positive for arthralgias  Skin: Negative  Allergic/Immunologic: Negative  Neurological: Negative  Hematological: Negative  Psychiatric/Behavioral: Negative  Past Medical History:   Diagnosis Date    Atrial fibrillation ([facility] [address] )     Carrero's esophagus     last assessed: 1/23/2018    Breast cancer ([facility] )     stage 1 (left), given adjuvant radiation with Arimidex x 5 years    Cancer [facility])     Left Breast, Lumpectomy    Cataract     last assessed: 3/11/2014    Dysplasia of toenail     last assessed: 8/29/2017    Esophageal reflux     GERD (gastroesophageal reflux disease)     Gross hematuria     last assessed: 2/19/2015    Hematuria     Hiatal hernia     History of radiation therapy     Hypertension     Irregular heart beat     AFIB    Mixed sensory-motor polyneuropathy     Neuropathy     Obesity     Pacemaker     Paroxysmal atrial fibrillation (HCC)     Peripheral neuropathy     Rectal bleeding     Restless leg syndrome     Shortness of breath     last assessed: 1/11/2016       Past Surgical History:   Procedure Laterality Date    BREAST BIOPSY      BREAST LUMPECTOMY Left     onset: 2006    BREAST SURGERY      CARDIAC PACEMAKER PLACEMENT      x 3 2006    CATARACT EXTRACTION      COLONOSCOPY      CYSTOSCOPY  04/04/2014    diagnostic    HYSTERECTOMY      ALISON BSO; due to fibroid uterus; age 38   Arline Courser KNEE CARTILAGE SURGERY      excision lesion of meniscus or capsule knee    KNEE SURGERY      OOPHORECTOMY Bilateral     OTHER SURGICAL HISTORY      radiation therapy    DC COLONOSCOPY FLX DX W/COLLJ SPEC WHEN PFRMD N/A 2/8/2017    Procedure: COLONOSCOPY;  Surgeon: Teri Miller MD;  Location: [facility]; Service: Gastroenterology    DC ESOPHAGOGASTRODUODENOSCOPY TRANSORAL DIAGNOSTIC N/A 9/20/2017    Procedure: ESOPHAGOGASTRODUODENOSCOPY (EGD); Surgeon: Hue Oropeza MD;  Location: [facility];   Service: Gastroenterology    UPPER GASTROINTESTINAL ENDOSCOPY Family History   Problem Relation Age of Onset    Hypertension Mother     Heart disease Father     Aneurysm Father     Coronary artery disease Father         in his 76s with aneurysm    Aortic aneurysm Father         abdominal    Scleroderma Sister     Breast cancer Sister 76    Hypertension Sister     No Known Problems Son     Testicular cancer Son     Thyroid cancer Son     Colon cancer Maternal Aunt     Colon cancer Maternal Aunt        Social History     Occupational History    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      Comment: retired   Tobacco Use    Smoking status: Former Smoker     Years: 25 00     Last attempt to quit: 1980     Years since quittin 4    Smokeless tobacco: Never Used    Tobacco comment: Quit over 30 years ago; quit age 41   Substance and Sexual Activity    Alcohol use: Not Currently    Drug use: No    Sexual activity: Not on file         Current Outpatient Medications:     Calcium Acetate, Phos Binder, (CALCIUM ACETATE PO), Take by mouth daily  , Disp: , Rfl:     Cholecalciferol (VITAMIN D3) 5000 units CAPS, Take by mouth daily , Disp: , Rfl:     clobetasol (TEMOVATE) 0 05 % cream, Apply topically once a week  , Disp: , Rfl:     furosemide (LASIX) 40 mg tablet, Take 1 tablet (40 mg total) by mouth daily, Disp: 90 tablet, Rfl: 3    gabapentin (NEURONTIN) 800 mg tablet, Take 1 tablet (800 mg total) by mouth 4 (four) times a day, Disp: 360 tablet, Rfl: 1    lisinopril (ZESTRIL) 40 mg tablet, Take 1 tablet (40 mg total) by mouth daily As directed, Disp: 90 tablet, Rfl: 3    Metoprolol Tartrate 75 MG TABS, Take 1 tablet (75 mg total) by mouth 2 (two) times a day, Disp: 60 tablet, Rfl: 0    multivitamin (THERAGRAN) TABS, Take 1 tablet by mouth daily, Disp: , Rfl:     pramipexole (MIRAPEX) 0 5 mg tablet, One and half tablet at 5:00 p m  and 10:00 p m , Disp: 270 tablet, Rfl: 0    Probiotic Product (PROBIOTIC PO), Take by mouth daily , Disp: , Rfl:     warfarin (COUMADIN) 5 mg tablet, TAKE 1 AND 1/2 TABLETS TO 2 TABLETS BY MOUTH DAILY OR  AS ORDERED BY PHYSICIAN, Disp: 180 tablet, Rfl: 3    Allergies   Allergen Reactions    Cephalexin Rash    Duloxetine Hcl Other (See Comments)     Facial pins and needles sensation    Erythromycin Rash    Levofloxacin Other (See Comments)     Muscular aches    Penicillins Rash    Savella [Milnacipran] Rash    Sulfa Antibiotics Rash       Objective:  Vitals:    03/01/19 1256   BP: 99/64   Pulse: 101       Body mass index is 41 37 kg/m²  Right Knee Exam     Muscle Strength   The patient has normal right knee strength  Tenderness   The patient is experiencing tenderness in the lateral joint line, patella and medial joint line  Range of Motion   Extension: 0   Flexion: 120     Tests   Ned:  Medial - negative Lateral - negative  Varus: negative Valgus: negative  Lachman:  Anterior - negative      Drawer:  Anterior - negative      Patellar apprehension: negative    Other   Erythema: absent  Scars: absent  Sensation: normal  Pulse: present  Swelling: none  Effusion: no effusion present    Comments:  Crepitance on AROM and PROM-parapatellar  - PFG  No increased warmth of knee  Knee is stable at 0, 30, 90 degrees  Grossly distally NVI  Thigh and calf soft and non tender          Observations     Right Knee   Negative for effusion  Physical Exam   Constitutional: She is oriented to person, place, and time  She appears well-developed and well-nourished  Body mass index is 41 37 kg/m²  HENT:   Head: Normocephalic and atraumatic  Eyes: EOM are normal    Neck: Normal range of motion  Cardiovascular: Intact distal pulses  Pulmonary/Chest: Effort normal    Musculoskeletal:        Right knee: She exhibits no effusion  See ortho exam   Neurological: She is alert and oriented to person, place, and time  Skin: Skin is warm and dry  Psychiatric: She has a normal mood and affect   Her behavior is normal  Judgment and thought content normal

## 2019-03-05 ENCOUNTER — APPOINTMENT (OUTPATIENT)
Dept: LAB | Facility: CLINIC | Age: 79
End: 2019-03-05
Payer: MEDICARE

## 2019-03-05 DIAGNOSIS — I48.91 ATRIAL FIBRILLATION, UNSPECIFIED TYPE (HCC): ICD-10-CM

## 2019-03-05 DIAGNOSIS — E11.42 DIABETIC POLYNEUROPATHY ASSOCIATED WITH TYPE 2 DIABETES MELLITUS (HCC): ICD-10-CM

## 2019-03-05 DIAGNOSIS — I10 ESSENTIAL HYPERTENSION: ICD-10-CM

## 2019-03-05 LAB
ALBUMIN SERPL BCP-MCNC: 3.5 G/DL (ref 3.5–5)
ALP SERPL-CCNC: 47 U/L (ref 46–116)
ALT SERPL W P-5'-P-CCNC: 34 U/L (ref 12–78)
ANION GAP SERPL CALCULATED.3IONS-SCNC: 7 MMOL/L (ref 4–13)
AST SERPL W P-5'-P-CCNC: 16 U/L (ref 5–45)
BASOPHILS # BLD AUTO: 0.07 THOUSANDS/ΜL (ref 0–0.1)
BASOPHILS NFR BLD AUTO: 1 % (ref 0–1)
BILIRUB SERPL-MCNC: 0.8 MG/DL (ref 0.2–1)
BUN SERPL-MCNC: 33 MG/DL (ref 5–25)
CALCIUM SERPL-MCNC: 9.3 MG/DL (ref 8.3–10.1)
CHLORIDE SERPL-SCNC: 105 MMOL/L (ref 100–108)
CHOLEST SERPL-MCNC: 170 MG/DL (ref 50–200)
CO2 SERPL-SCNC: 30 MMOL/L (ref 21–32)
CREAT SERPL-MCNC: 0.9 MG/DL (ref 0.6–1.3)
EOSINOPHIL # BLD AUTO: 0 THOUSAND/ΜL (ref 0–0.61)
EOSINOPHIL NFR BLD AUTO: 0 % (ref 0–6)
ERYTHROCYTE [DISTWIDTH] IN BLOOD BY AUTOMATED COUNT: 14.8 % (ref 11.6–15.1)
EST. AVERAGE GLUCOSE BLD GHB EST-MCNC: 114 MG/DL
GFR SERPL CREATININE-BSD FRML MDRD: 62 ML/MIN/1.73SQ M
GLUCOSE P FAST SERPL-MCNC: 86 MG/DL (ref 65–99)
HBA1C MFR BLD: 5.6 % (ref 4.2–6.3)
HCT VFR BLD AUTO: 42.6 % (ref 34.8–46.1)
HDLC SERPL-MCNC: 41 MG/DL (ref 40–60)
HGB BLD-MCNC: 13.6 G/DL (ref 11.5–15.4)
IMM GRANULOCYTES # BLD AUTO: 0.07 THOUSAND/UL (ref 0–0.2)
IMM GRANULOCYTES NFR BLD AUTO: 1 % (ref 0–2)
LDLC SERPL CALC-MCNC: 105 MG/DL (ref 0–100)
LYMPHOCYTES # BLD AUTO: 2.2 THOUSANDS/ΜL (ref 0.6–4.47)
LYMPHOCYTES NFR BLD AUTO: 21 % (ref 14–44)
MCH RBC QN AUTO: 31.4 PG (ref 26.8–34.3)
MCHC RBC AUTO-ENTMCNC: 31.9 G/DL (ref 31.4–37.4)
MCV RBC AUTO: 98 FL (ref 82–98)
MONOCYTES # BLD AUTO: 0.77 THOUSAND/ΜL (ref 0.17–1.22)
MONOCYTES NFR BLD AUTO: 8 % (ref 4–12)
NEUTROPHILS # BLD AUTO: 7.22 THOUSANDS/ΜL (ref 1.85–7.62)
NEUTS SEG NFR BLD AUTO: 69 % (ref 43–75)
NONHDLC SERPL-MCNC: 129 MG/DL
NRBC BLD AUTO-RTO: 0 /100 WBCS
PLATELET # BLD AUTO: 240 THOUSANDS/UL (ref 149–390)
PMV BLD AUTO: 11.4 FL (ref 8.9–12.7)
POTASSIUM SERPL-SCNC: 4.3 MMOL/L (ref 3.5–5.3)
PROT SERPL-MCNC: 7.3 G/DL (ref 6.4–8.2)
RBC # BLD AUTO: 4.33 MILLION/UL (ref 3.81–5.12)
SODIUM SERPL-SCNC: 142 MMOL/L (ref 136–145)
TRIGL SERPL-MCNC: 119 MG/DL
TSH SERPL DL<=0.05 MIU/L-ACNC: 0.95 UIU/ML (ref 0.36–3.74)
WBC # BLD AUTO: 10.33 THOUSAND/UL (ref 4.31–10.16)

## 2019-03-05 PROCEDURE — 83036 HEMOGLOBIN GLYCOSYLATED A1C: CPT

## 2019-03-05 PROCEDURE — 80053 COMPREHEN METABOLIC PANEL: CPT

## 2019-03-05 PROCEDURE — 36415 COLL VENOUS BLD VENIPUNCTURE: CPT

## 2019-03-05 PROCEDURE — 84443 ASSAY THYROID STIM HORMONE: CPT

## 2019-03-05 PROCEDURE — 85025 COMPLETE CBC W/AUTO DIFF WBC: CPT

## 2019-03-05 PROCEDURE — 80061 LIPID PANEL: CPT

## 2019-03-07 PROBLEM — E11.51 TYPE 2 DIABETES MELLITUS WITH DIABETIC PERIPHERAL ANGIOPATHY WITHOUT GANGRENE (HCC): Status: ACTIVE | Noted: 2019-03-07

## 2019-03-08 ENCOUNTER — OFFICE VISIT (OUTPATIENT)
Dept: OBGYN CLINIC | Facility: CLINIC | Age: 79
End: 2019-03-08
Payer: MEDICARE

## 2019-03-08 VITALS
SYSTOLIC BLOOD PRESSURE: 103 MMHG | HEIGHT: 64 IN | WEIGHT: 239 LBS | BODY MASS INDEX: 40.8 KG/M2 | HEART RATE: 87 BPM | DIASTOLIC BLOOD PRESSURE: 69 MMHG

## 2019-03-08 DIAGNOSIS — M17.0 PRIMARY OSTEOARTHRITIS OF BOTH KNEES: Primary | ICD-10-CM

## 2019-03-08 DIAGNOSIS — G89.29 CHRONIC PAIN OF RIGHT KNEE: ICD-10-CM

## 2019-03-08 DIAGNOSIS — M25.561 CHRONIC PAIN OF RIGHT KNEE: ICD-10-CM

## 2019-03-08 PROCEDURE — 20610 DRAIN/INJ JOINT/BURSA W/O US: CPT | Performed by: PHYSICIAN ASSISTANT

## 2019-03-08 NOTE — PROGRESS NOTES
Assessment/Plan:  1  Primary osteoarthritis of both knees  Large joint arthrocentesis   2  Chronic pain of right knee  Large joint arthrocentesis     Ozzie Paulino is a very pleasant 68-year-old female presenting for the 3rd and final Synvisc injection for her activity related right knee pain due to her severe underlying osteoarthritis  She consented to and underwent the injection as detailed below without difficulty or complication  Post injection instructions were provided  She can return in 6 months for reevaluation and reinitiation of viscosupplementation, if needed  All questions addressed today  Large joint arthrocentesis: R knee  Date/Time: 3/8/2019 1:14 PM  Consent given by: patient  Site marked: site marked  Timeout: Immediately prior to procedure a time out was called to verify the correct patient, procedure, equipment, support staff and site/side marked as required   Supporting Documentation  Indications: pain   Procedure Details  Location: knee - R knee  Preparation: Patient was prepped and draped in the usual sterile fashion  Needle size: 20 G  Ultrasound guidance: no  Approach: anterolateral  Medications administered: 16 mg hylan 16 MG/2ML    Patient tolerance: patient tolerated the procedure well with no immediate complications  Dressing:  Sterile dressing applied          Subjective: Right knee Synvisc #3    Patient ID: Winterthurrocio Kelley is a 70 y o  female  Ozzie Paulino is a very pleasant 68-year-old female presenting today for the 3rd Synvisc injection for her right knee osteoarthritis and activity related knee pain  She has seen benefit from the 1st two injections and did not have any adverse effects  Review of Systems   Constitutional: Negative  HENT: Negative  Eyes: Negative  Respiratory: Negative  Cardiovascular: Negative  Gastrointestinal: Negative  Endocrine: Negative  Genitourinary: Negative  Musculoskeletal: Positive for arthralgias  Skin: Negative  Allergic/Immunologic: Negative  Neurological: Negative  Hematological: Negative  Psychiatric/Behavioral: Negative  Past Medical History:   Diagnosis Date    Atrial fibrillation ([facility] [address] )     Carrero's esophagus     last assessed: 1/23/2018    Breast cancer ([facility] [address] )     stage 1 (left), given adjuvant radiation with Arimidex x 5 years    Cancer [facility])     Left Breast, Lumpectomy    Cataract     last assessed: 3/11/2014    Dysplasia of toenail     last assessed: 8/29/2017    Esophageal reflux     GERD (gastroesophageal reflux disease)     Gross hematuria     last assessed: 2/19/2015    Hematuria     Hiatal hernia     History of radiation therapy     Hypertension     Irregular heart beat     AFIB    Mixed sensory-motor polyneuropathy     Neuropathy     Obesity     Pacemaker     Paroxysmal atrial fibrillation (HCC)     Peripheral neuropathy     Rectal bleeding     Restless leg syndrome     Shortness of breath     last assessed: 1/11/2016       Past Surgical History:   Procedure Laterality Date    BREAST BIOPSY      BREAST LUMPECTOMY Left     onset: 2006    BREAST SURGERY      CARDIAC PACEMAKER PLACEMENT      x 3 2006    CATARACT EXTRACTION      COLONOSCOPY      CYSTOSCOPY  04/04/2014    diagnostic    HYSTERECTOMY      ALISON BSO; due to fibroid uterus; age 38   [facility] KNEE CARTILAGE SURGERY      excision lesion of meniscus or capsule knee    KNEE SURGERY      OOPHORECTOMY Bilateral     OTHER SURGICAL HISTORY      radiation therapy    OH COLONOSCOPY FLX DX W/COLLJ SPEC WHEN PFRMD N/A 2/8/2017    Procedure: COLONOSCOPY;  Surgeon: Alton Davis MD;  Location: [facility]; Service: Gastroenterology    OH ESOPHAGOGASTRODUODENOSCOPY TRANSORAL DIAGNOSTIC N/A 9/20/2017    Procedure: ESOPHAGOGASTRODUODENOSCOPY (EGD); Surgeon: Milli Gooden MD;  Location: [facility];   Service: Gastroenterology    UPPER GASTROINTESTINAL ENDOSCOPY         Family History   Problem Relation Age of Onset    Hypertension Mother     Heart disease Father     Aneurysm Father     Coronary artery disease Father         in his 76s with aneurysm    Aortic aneurysm Father         abdominal    Scleroderma Sister     Breast cancer Sister 76    Hypertension Sister     No Known Problems Son     Testicular cancer Son     Thyroid cancer Son     Colon cancer Maternal Aunt     Colon cancer Maternal Aunt        Social History     Occupational History    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      Comment: retired   Tobacco Use    Smoking status: Former Smoker     Years: 25 00     Last attempt to quit: 1980     Years since quittin 4    Smokeless tobacco: Never Used    Tobacco comment: Quit over 30 years ago; quit age 41   Substance and Sexual Activity    Alcohol use: Not Currently    Drug use: No    Sexual activity: Not on file         Current Outpatient Medications:     Calcium Acetate, Phos Binder, (CALCIUM ACETATE PO), Take by mouth daily  , Disp: , Rfl:     Cholecalciferol (VITAMIN D3) 5000 units CAPS, Take by mouth daily , Disp: , Rfl:     clobetasol (TEMOVATE) 0 05 % cream, Apply topically once a week  , Disp: , Rfl:     furosemide (LASIX) 40 mg tablet, Take 1 tablet (40 mg total) by mouth daily, Disp: 90 tablet, Rfl: 3    gabapentin (NEURONTIN) 800 mg tablet, Take 1 tablet (800 mg total) by mouth 4 (four) times a day, Disp: 360 tablet, Rfl: 1    lisinopril (ZESTRIL) 40 mg tablet, Take 1 tablet (40 mg total) by mouth daily As directed, Disp: 90 tablet, Rfl: 3    Metoprolol Tartrate 75 MG TABS, Take 1 tablet (75 mg total) by mouth 2 (two) times a day, Disp: 60 tablet, Rfl: 0    multivitamin (THERAGRAN) TABS, Take 1 tablet by mouth daily, Disp: , Rfl:     pramipexole (MIRAPEX) 0 5 mg tablet, One and half tablet at 5:00 p m  and 10:00 p m , Disp: 270 tablet, Rfl: 0    Probiotic Product (PROBIOTIC PO), Take by mouth daily , Disp: , Rfl:     warfarin (COUMADIN) 5 mg tablet, TAKE 1 AND 1/2 TABLETS TO 2 TABLETS BY MOUTH DAILY OR  AS ORDERED BY PHYSICIAN, Disp: 180 tablet, Rfl: 3    Allergies   Allergen Reactions    Cephalexin Rash    Duloxetine Hcl Other (See Comments)     Facial pins and needles sensation    Erythromycin Rash    Levofloxacin Other (See Comments)     Muscular aches    Penicillins Rash    Savella [Milnacipran] Rash    Sulfa Antibiotics Rash       Objective:  Vitals:    03/08/19 1257   BP: 103/69   Pulse: 87       Body mass index is 41 02 kg/m²  Right Knee Exam     Muscle Strength   The patient has normal right knee strength  Tenderness   The patient is experiencing tenderness in the lateral joint line, patella and medial joint line  Range of Motion   Extension: 0   Flexion: 120     Tests   Ned:  Medial - negative Lateral - negative  Varus: negative Valgus: negative  Lachman:  Anterior - negative      Drawer:  Anterior - negative      Patellar apprehension: negative    Other   Erythema: absent  Scars: absent  Sensation: normal  Pulse: present  Swelling: none  Effusion: no effusion present    Comments:  Crepitance on AROM and PROM-parapatellar  - PFG  No increased warmth of knee  Knee is stable at 0, 30, 90 degrees  Grossly distally NVI  Thigh and calf soft and non tender          Observations     Right Knee   Negative for effusion  Physical Exam   Constitutional: She is oriented to person, place, and time  She appears well-developed and well-nourished  Body mass index is 41 02 kg/m²  HENT:   Head: Normocephalic and atraumatic  Eyes: EOM are normal    Neck: Normal range of motion  Cardiovascular: Intact distal pulses  Pulmonary/Chest: Effort normal    Musculoskeletal:        Right knee: She exhibits no effusion  See ortho exam   Neurological: She is alert and oriented to person, place, and time  Skin: Skin is warm and dry  Psychiatric: She has a normal mood and affect   Her behavior is normal  Judgment and thought content normal

## 2019-03-14 ENCOUNTER — OFFICE VISIT (OUTPATIENT)
Dept: INTERNAL MEDICINE CLINIC | Facility: CLINIC | Age: 79
End: 2019-03-14
Payer: MEDICARE

## 2019-03-14 VITALS
OXYGEN SATURATION: 98 % | RESPIRATION RATE: 18 BRPM | HEIGHT: 64 IN | TEMPERATURE: 96.6 F | HEART RATE: 86 BPM | SYSTOLIC BLOOD PRESSURE: 112 MMHG | WEIGHT: 238.4 LBS | DIASTOLIC BLOOD PRESSURE: 72 MMHG | BODY MASS INDEX: 40.7 KG/M2

## 2019-03-14 DIAGNOSIS — E66.01 CLASS 3 SEVERE OBESITY DUE TO EXCESS CALORIES WITH SERIOUS COMORBIDITY AND BODY MASS INDEX (BMI) OF 40.0 TO 44.9 IN ADULT (HCC): ICD-10-CM

## 2019-03-14 DIAGNOSIS — G31.84 MILD COGNITIVE IMPAIRMENT: ICD-10-CM

## 2019-03-14 DIAGNOSIS — Z00.00 HEALTH MAINTENANCE EXAMINATION: ICD-10-CM

## 2019-03-14 DIAGNOSIS — E11.42 DIABETIC POLYNEUROPATHY ASSOCIATED WITH TYPE 2 DIABETES MELLITUS (HCC): ICD-10-CM

## 2019-03-14 DIAGNOSIS — K21.9 GASTROESOPHAGEAL REFLUX DISEASE, ESOPHAGITIS PRESENCE NOT SPECIFIED: ICD-10-CM

## 2019-03-14 DIAGNOSIS — I10 ESSENTIAL HYPERTENSION: ICD-10-CM

## 2019-03-14 DIAGNOSIS — G25.81 RLS (RESTLESS LEGS SYNDROME): Primary | ICD-10-CM

## 2019-03-14 DIAGNOSIS — I48.20 CHRONIC ATRIAL FIBRILLATION (HCC): ICD-10-CM

## 2019-03-14 DIAGNOSIS — M17.12 PRIMARY OSTEOARTHRITIS OF LEFT KNEE: ICD-10-CM

## 2019-03-14 DIAGNOSIS — M85.89 OSTEOPENIA OF MULTIPLE SITES: ICD-10-CM

## 2019-03-14 PROBLEM — R79.1 SUPRATHERAPEUTIC INR: Status: RESOLVED | Noted: 2018-11-27 | Resolved: 2019-03-14

## 2019-03-14 PROBLEM — M79.671 PAIN IN BOTH FEET: Status: RESOLVED | Noted: 2018-09-26 | Resolved: 2019-03-14

## 2019-03-14 PROBLEM — J45.909 ASTHMATIC BRONCHITIS: Status: RESOLVED | Noted: 2018-06-25 | Resolved: 2019-03-14

## 2019-03-14 PROBLEM — M79.672 PAIN IN BOTH FEET: Status: RESOLVED | Noted: 2018-09-26 | Resolved: 2019-03-14

## 2019-03-14 PROCEDURE — G0439 PPPS, SUBSEQ VISIT: HCPCS | Performed by: INTERNAL MEDICINE

## 2019-03-14 PROCEDURE — 99214 OFFICE O/P EST MOD 30 MIN: CPT | Performed by: INTERNAL MEDICINE

## 2019-03-14 NOTE — PROGRESS NOTES
Assessment/Plan:    Atrial fibrillation (HCC) [I48 91]  Intermittent symptoms, feels more short of breath lately  Instructed to check pulse if with palpitations  On warfarin, metoprolol  Osteoarthritis of left knee  S/p injections  Follow up with Ortho  Mild cognitive impairment  Monitor symptoms  Repeat MOCA in the fall  Follow up with neurology  RLS (restless legs syndrome)  Stable, on gabapentin and pramipexole  Followed by neurology  Pedal edema  Improved, on daily furosemide  GERD (gastroesophageal reflux disease)  Monitor symptoms, may take ranitidine prn  Class 3 severe obesity due to excess calories with serious comorbidity and body mass index (BMI) of 40 0 to 44 9 in [facility])  Gained some weight since last visit  Start regular walks, twice a day  Essential hypertension  Stable, on lisinopril and metoprolol  Diabetic polyneuropathy associated with type 2 diabetes mellitus ([facility] )  Lab Results   Component Value Date    HGBA1C 5 6 03/05/2019     On gabapentin only  Osteopenia of multiple sites  Bone density due 12/19  On supplements  Diagnoses and all orders for this visit:    RLS (restless legs syndrome)    Essential hypertension    Class 3 severe obesity due to excess calories with serious comorbidity and body mass index (BMI) of 40 0 to 44 9 in [facility])    Osteopenia of multiple sites    Chronic atrial fibrillation (HCC)    Mild cognitive impairment    Primary osteoarthritis of left knee    Gastroesophageal reflux disease, esophagitis presence not specified    Diabetic polyneuropathy associated with type 2 diabetes mellitus ([facility] )    Health maintenance examination  Comments:  Updated  Follow up in 4 months or as needed  Subjective:      Patient ID: Ricard Schirmer is a 70 y o  female  Mrs Ortiz feels alright  May have complains of bilateral knee pain  She has been receiving injections, hopes that they will help    She has been more sedentary this winter  She would normally law around her home and property for exercise  She reports occasional shortness of breath that will prevent her from exercising  This would only last for a few minutes, accompanied by occasional palpitations  No dizziness, syncope  She does not check her pulse or blood pressure were when symptomatic  She blames this on her AFib  She reports an episode with lower abdominal discomfort after chewing on ranitidine  This has since resolved  She moves her bowels 1 to 2 times a day  She also complains of memory issues, has been forgetting events and information more frequently  The following portions of the patient's history were reviewed and updated as appropriate: allergies, current medications, past medical history, past social history and problem list     Review of Systems   Constitutional: Negative for appetite change and fatigue  HENT: Negative for congestion, ear pain and postnasal drip  Eyes: Negative for visual disturbance  Respiratory: Positive for shortness of breath  Negative for cough  Cardiovascular: Negative for chest pain and leg swelling  Gastrointestinal: Negative for abdominal pain, constipation and diarrhea  Genitourinary: Negative for dysuria, frequency and urgency  Musculoskeletal: Positive for arthralgias and gait problem  Negative for myalgias  Skin: Negative for rash and wound  Neurological: Negative for dizziness, numbness and headaches  Hematological: Does not bruise/bleed easily  Psychiatric/Behavioral: Negative for confusion  The patient is not nervous/anxious  Objective:      /72   Pulse 86   Temp (!) 96 6 °F (35 9 °C) (Oral)   Resp 18   Ht 5' 4" (1 626 m)   Wt 108 kg (238 lb 6 4 oz)   SpO2 98%   BMI 40 92 kg/m²          Physical Exam   Constitutional: She is oriented to person, place, and time  She appears well-developed and well-nourished  HENT:   Head: Normocephalic and atraumatic     Nose: Nose normal    Eyes: Pupils are equal, round, and reactive to light  Conjunctivae are normal    Neck: Neck supple  Cardiovascular: Normal rate, regular rhythm and normal heart sounds  Pulmonary/Chest: Effort normal and breath sounds normal  She has no wheezes  She has no rales  Abdominal: Soft  Bowel sounds are normal    Musculoskeletal: She exhibits no edema  Neurological: She is alert and oriented to person, place, and time  Skin: Skin is warm  No rash noted  Psychiatric: She has a normal mood and affect  Her behavior is normal    Nursing note and vitals reviewed

## 2019-03-14 NOTE — PROGRESS NOTES
Assessment and Plan:    Problem List Items Addressed This Visit        Digestive    GERD (gastroesophageal reflux disease)     Monitor symptoms, may take ranitidine prn  Endocrine    Diabetic polyneuropathy associated with type 2 diabetes mellitus ([facility] )     Lab Results   Component Value Date    HGBA1C 5 6 03/05/2019     On gabapentin only  Cardiovascular and Mediastinum    Atrial fibrillation (HCC) [I48 91]     Intermittent symptoms, feels more short of breath lately  Instructed to check pulse if with palpitations  On warfarin, metoprolol  Essential hypertension     Stable, on lisinopril and metoprolol  Nervous and Auditory    Mild cognitive impairment     Monitor symptoms  Repeat MOCA in the fall  Follow up with neurology  Musculoskeletal and Integument    Osteoarthritis of left knee     S/p injections  Follow up with Ortho  Osteopenia of multiple sites     Bone density due 12/19  On supplements  Other    Class 3 severe obesity due to excess calories with serious comorbidity and body mass index (BMI) of 40 0 to 44 9 in [facility])     Gained some weight since last visit  Start regular walks, twice a day  RLS (restless legs syndrome) - Primary     Stable, on gabapentin and pramipexole  Followed by neurology  Health Maintenance Due   Topic Date Due    URINE MICROALBUMIN  12/24/1952    BMI: Followup Plan  12/24/1960    HEPATITIS B VACCINES (1 of 3 - Risk 3-dose series) 12/24/1961    DTaP,Tdap,and Td Vaccines (1 - Tdap) 09/02/1990    Urinary Incontinence Screening  12/24/2007    DM Eye Exam  03/10/2018    Medicare Annual Wellness Visit (AWV)  03/12/2019         HPI:  Giles Guallpa is a 70 y o  female here for her Subsequent Wellness Visit      Patient Active Problem List   Diagnosis    Hiatal hernia    Atrial fibrillation ([facility] ) [I48 91]    Acquired deformity of foot    Corns    Diabetic polyneuropathy associated with type 2 diabetes mellitus (HCC)    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HCC)    Radiculopathy of lumbar region    Primary osteoarthritis of both knees    RLS (restless legs syndrome)    Arthritis    Esophageal reflux    Essential hypertension    Lichen sclerosus et atrophicus    Multiple lung nodules    Class 3 severe obesity due to excess calories with serious comorbidity and body mass index (BMI) of 40 0 to 44 9 in adult [facility])    Osteoarthritis of left knee    Osteopenia of multiple sites    Peripheral neuropathy    Vitamin D deficiency    Dense breast tissue on mammogram    Difficulty walking    Flat foot    Onychomycosis    Carrero's esophagus with dysplasia    Chronic pain of left knee    Mild cognitive impairment    Epigastric abdominal pain    Pacemaker    GERD (gastroesophageal reflux disease)    Pedal edema    Type 2 diabetes mellitus with diabetic peripheral angiopathy without gangrene ([facility] )     Past Medical History:   Diagnosis Date    Atrial fibrillation ([facility] )     Carrero's esophagus     last assessed: 1/23/2018    Breast cancer ([facility] )     stage 1 (left), given adjuvant radiation with Arimidex x 5 years    Cancer [facility])     Left Breast, Lumpectomy    Cataract     last assessed: 3/11/2014    Dysplasia of toenail     last assessed: 8/29/2017    Esophageal reflux     GERD (gastroesophageal reflux disease)     Gross hematuria     last assessed: 2/19/2015    Hematuria     Hiatal hernia     History of radiation therapy     Hypertension     Irregular heart beat     AFIB    Mixed sensory-motor polyneuropathy     Neuropathy     Obesity     Pacemaker     Paroxysmal atrial fibrillation (HCC)     Peripheral neuropathy     Rectal bleeding     Restless leg syndrome     Shortness of breath     last assessed: 1/11/2016     Past Surgical History:   Procedure Laterality Date    BREAST BIOPSY      BREAST LUMPECTOMY Left     onset: 2006    BREAST SURGERY      CARDIAC PACEMAKER PLACEMENT      x 3     CATARACT EXTRACTION      COLONOSCOPY      CYSTOSCOPY  2014    diagnostic    HYSTERECTOMY      ALISON BSO; due to fibroid uterus; age 38    KNEE CARTILAGE SURGERY      excision lesion of meniscus or capsule knee    KNEE SURGERY      OOPHORECTOMY Bilateral     OTHER SURGICAL HISTORY      radiation therapy    FL COLONOSCOPY FLX DX W/COLLJ SPEC WHEN PFRMD N/A 2017    Procedure: COLONOSCOPY;  Surgeon: Omi Carbajal MD;  [facility]: [facility]; Service: Gastroenterology    FL ESOPHAGOGASTRODUODENOSCOPY TRANSORAL DIAGNOSTIC N/A 2017    Procedure: ESOPHAGOGASTRODUODENOSCOPY (EGD); Surgeon: Lowell Collazo MD;  Location: [facility];   Service: Gastroenterology    UPPER GASTROINTESTINAL ENDOSCOPY       Family History   Problem Relation Age of Onset    Hypertension Mother     Heart disease Father     Aneurysm Father     Coronary artery disease Father         in his 76s with aneurysm    Aortic aneurysm Father         abdominal    Scleroderma Sister     Breast cancer Sister 76    Hypertension Sister     No Known Problems Son     Testicular cancer Son     Thyroid cancer Son     Colon cancer Maternal Aunt     Colon cancer Maternal Aunt      Social History     Tobacco Use   Smoking Status Former Smoker    Years: 25 00    Last attempt to quit: 1980    Years since quittin 5   Smokeless Tobacco Never Used   Tobacco Comment    Quit over 30 years ago; quit age 41     Social History     Substance and Sexual Activity   Alcohol Use Not Currently      Social History     Substance and Sexual Activity   Drug Use No       Current Outpatient Medications   Medication Sig Dispense Refill    Calcium Acetate, Phos Binder, (CALCIUM ACETATE PO) Take by mouth daily        Cholecalciferol (VITAMIN D3) 5000 units CAPS Take by mouth daily       clobetasol (TEMOVATE) 0 05 % cream Apply topically once a week        furosemide (LASIX) 40 mg tablet Take 1 tablet (40 mg total) by mouth daily 90 tablet 3    gabapentin (NEURONTIN) 800 mg tablet Take 1 tablet (800 mg total) by mouth 4 (four) times a day 360 tablet 1    lisinopril (ZESTRIL) 40 mg tablet Take 1 tablet (40 mg total) by mouth daily As directed 90 tablet 3    Metoprolol Tartrate 75 MG TABS Take 1 tablet (75 mg total) by mouth 2 (two) times a day 60 tablet 0    multivitamin (THERAGRAN) TABS Take 1 tablet by mouth daily      pramipexole (MIRAPEX) 0 5 mg tablet One and half tablet at 5:00 p m  and 10:00 p m  270 tablet 0    Probiotic Product (PROBIOTIC PO) Take by mouth daily       warfarin (COUMADIN) 5 mg tablet TAKE 1 AND 1/2 TABLETS TO 2 TABLETS BY MOUTH DAILY OR  AS ORDERED BY PHYSICIAN 180 tablet 3     No current facility-administered medications for this visit  Allergies   Allergen Reactions    Cephalexin Rash    Duloxetine Hcl Other (See Comments)     Facial pins and needles sensation    Erythromycin Rash    Levofloxacin Other (See Comments)     Muscular aches    Penicillins Rash    Savella [Milnacipran] Rash    Sulfa Antibiotics Rash     Immunization History   Administered Date(s) Administered    Influenza Split High Dose Preservative Free IM 09/29/2016, 09/11/2017    Influenza, high dose seasonal 0 5 mL 09/14/2018    Pneumococcal Conjugate 13-Valent 11/11/2015    Pneumococcal Polysaccharide PPV23 1942, 11/29/2018    Td (adult), adsorbed 09/01/1990    Zoster 01/01/2012       Patient Care Team:  Maikel Sanders MD as PCP - MD Shon Lackey MD Manford Ramus, MD Bolling Conrad, MD Alton Pikes, MD Lemont Bitters, MD    Medicare Screening Tests and Risk Assessments:  Lamar Godoy is here for her Subsequent Wellness visit  Last Medicare Wellness visit information reviewed, patient interviewed and updates made to the record today  Health Risk Assessment:  Patient rates overall health as good  Patient feels that their physical health rating is Same  Eyesight was rated as Same  Hearing was rated as Same  Patient feels that their emotional and mental health rating is Same  Pain experienced by patient in the last 7 days has been Some  Patient's pain rating has been 6/10  Patient states that she has experienced no weight loss or gain in last 6 months  Emotional/Mental Health:  Patient has been feeling nervous/anxious  PHQ-9 Depression Screening:    Frequency of the following problems over the past two weeks:      1  Little interest or pleasure in doing things: 0 - not at all      2  Feeling down, depressed, or hopeless: 0 - not at all  PHQ-2 Score: 0          Broken Bones/Falls: Fall Risk Assessment:    In the past year, patient has experienced: No history of falling in past year          Bladder/Bowel:  Patient has leaked urine accidently in the last six months  Patient reports no loss of bowel control  Immunizations:  Patient has had a flu vaccination within the last year  Patient has received a pneumonia shot  Patient has received a shingles shot  Patient has not received tetanus/diphtheria shot  Home Safety:  Patient has trouble with stairs inside or outside of their home  Patient currently reports that there are no safety hazards present in home, working smoke alarms, working carbon monoxide detectors  Preventative Screenings:   Breast cancer screening performed, colon cancer screen completed, no cholesterol screen completed, glaucoma eye exam completed,     Nutrition:  Current diet: Regular, Low Carb, No Added Salt and Limited junk food with servings of the following:    Medications:  Patient is currently taking over-the-counter supplements  Patient is able to manage medications  Lifestyle Choices:  Patient reports no tobacco use  Patient has smoked or used tobacco in the past   Patient has stopped her tobacco use  Patient wears seat belt          Activities of Daily Living:  Can get out of bed by his or her self, able to dress self, able to make own meals, able to do own shopping, able to bathe self, can do own laundry/housekeeping, can manage own money, pay bills and track expenses    Previous Hospitalizations:  No hospitalization or ED visit in past 12 months        Advanced Directives:  Patient has decided on a power of   Patient has spoken to designated power of   Patient has completed advanced directive  Preventative Screening/Counseling:      Cardiovascular:      General: Screening Current          Diabetes:      General: Screening Current          Colorectal Cancer:      General: Screening Current          Breast Cancer:      General: Screening Current          Cervical Cancer:      General: Screening Not Indicated          Osteoporosis:      General: Screening Current      Counseling: Regular Weightbearing Exercise and Calcium and Vitamin D Intake          AAA:      General: Screening Not Indicated          HIV:      General: Screening Not Indicated          Hepatitis C:      General: Screening Not Indicated        Advanced Directives:   Patient has living will for healthcare, End of life assessment reviewed with patient  Immunizations:      Influenza: Influenza UTD This Year      Pneumococcal: Lifetime Vaccine Completed      Other Preventative Counseling (Non-Medicare): Increase physical activity, Nutrition Counseling and Weight reduction discussed      Referrals:  Referral(s) to: Cardiologist and Orthopedics  Additional Comments: GI    Review of Systems   Constitutional: Negative for appetite change and fatigue  HENT: Negative for congestion, ear pain and postnasal drip  Eyes: Negative for visual disturbance  Respiratory: Positive for shortness of breath  Negative for cough  Cardiovascular: Negative for chest pain and leg swelling  Gastrointestinal: Negative for abdominal pain, constipation and diarrhea  Genitourinary: Negative for dysuria, frequency and urgency     Musculoskeletal: Positive for arthralgias and gait problem  Negative for myalgias  Skin: Negative for rash and wound  Neurological: Negative for dizziness, numbness and headaches  Hematological: Does not bruise/bleed easily  Psychiatric/Behavioral: Negative for confusion  The patient is not nervous/anxious            Objective:      /72   Pulse 86   Temp (!) 96 6 °F (35 9 °C) (Oral)   Resp 18   Ht 5' 4" (1 626 m)   Wt 108 kg (238 lb 6 4 oz)   SpO2 98%   BMI 40 92 kg/m²

## 2019-03-14 NOTE — ASSESSMENT & PLAN NOTE
Intermittent symptoms, feels more short of breath lately  Instructed to check pulse if with palpitations  On warfarin, metoprolol

## 2019-03-25 LAB
LEFT EYE DIABETIC RETINOPATHY: NORMAL
RIGHT EYE DIABETIC RETINOPATHY: NORMAL

## 2019-03-27 ENCOUNTER — TELEPHONE (OUTPATIENT)
Dept: INTERNAL MEDICINE CLINIC | Facility: CLINIC | Age: 79
End: 2019-03-27

## 2019-03-27 DIAGNOSIS — R39.9 URINARY SYMPTOM OR SIGN: Primary | ICD-10-CM

## 2019-03-27 NOTE — TELEPHONE ENCOUNTER
PT  HAS PRESSURE IN LOWER PELVIC AREA  AND INCREASED URINATION  NO BURNING  NO OTHER SYMPTOMS  SHE THINKS SHE HAS HAD THIS FOR A FEW WEEKS

## 2019-03-28 ENCOUNTER — ANTICOAG VISIT (OUTPATIENT)
Dept: CARDIOLOGY CLINIC | Facility: CLINIC | Age: 79
End: 2019-03-28

## 2019-03-28 ENCOUNTER — APPOINTMENT (OUTPATIENT)
Dept: LAB | Facility: HOSPITAL | Age: 79
End: 2019-03-28
Payer: MEDICARE

## 2019-03-28 ENCOUNTER — TRANSCRIBE ORDERS (OUTPATIENT)
Dept: ADMINISTRATIVE | Facility: HOSPITAL | Age: 79
End: 2019-03-28

## 2019-03-28 ENCOUNTER — TELEPHONE (OUTPATIENT)
Dept: INTERNAL MEDICINE CLINIC | Facility: CLINIC | Age: 79
End: 2019-03-28

## 2019-03-28 DIAGNOSIS — R39.9 URINARY SYMPTOM OR SIGN: Primary | ICD-10-CM

## 2019-03-28 DIAGNOSIS — I48.20 CHRONIC ATRIAL FIBRILLATION (HCC): ICD-10-CM

## 2019-03-28 DIAGNOSIS — I48.91 ATRIAL FIBRILLATION, UNSPECIFIED TYPE (HCC): ICD-10-CM

## 2019-03-28 LAB
BACTERIA UR QL AUTO: ABNORMAL /HPF
BILIRUB UR QL STRIP: NEGATIVE
CLARITY UR: CLEAR
COLOR UR: YELLOW
GLUCOSE UR STRIP-MCNC: NEGATIVE MG/DL
HGB UR QL STRIP.AUTO: NEGATIVE
INR PPP: 1.47 (ref 0.86–1.16)
KETONES UR STRIP-MCNC: NEGATIVE MG/DL
LEUKOCYTE ESTERASE UR QL STRIP: ABNORMAL
NITRITE UR QL STRIP: NEGATIVE
NON-SQ EPI CELLS URNS QL MICRO: ABNORMAL /HPF
PH UR STRIP.AUTO: 6 [PH]
PROT UR STRIP-MCNC: NEGATIVE MG/DL
PROTHROMBIN TIME: 15.1 SECONDS (ref 9.4–11.7)
RBC #/AREA URNS AUTO: ABNORMAL /HPF
SP GR UR STRIP.AUTO: <=1.005 (ref 1–1.03)
UROBILINOGEN UR QL STRIP.AUTO: 0.2 E.U./DL
WBC #/AREA URNS AUTO: ABNORMAL /HPF

## 2019-03-28 PROCEDURE — 36415 COLL VENOUS BLD VENIPUNCTURE: CPT

## 2019-03-28 PROCEDURE — 87077 CULTURE AEROBIC IDENTIFY: CPT | Performed by: INTERNAL MEDICINE

## 2019-03-28 PROCEDURE — 81001 URINALYSIS AUTO W/SCOPE: CPT | Performed by: INTERNAL MEDICINE

## 2019-03-28 PROCEDURE — 87186 SC STD MICRODIL/AGAR DIL: CPT | Performed by: INTERNAL MEDICINE

## 2019-03-28 PROCEDURE — 87086 URINE CULTURE/COLONY COUNT: CPT | Performed by: INTERNAL MEDICINE

## 2019-03-28 PROCEDURE — 85610 PROTHROMBIN TIME: CPT

## 2019-03-28 RX ORDER — NITROFURANTOIN 25; 75 MG/1; MG/1
100 CAPSULE ORAL 2 TIMES DAILY
Qty: 10 CAPSULE | Refills: 0 | Status: SHIPPED | OUTPATIENT
Start: 2019-03-28 | End: 2019-04-02

## 2019-03-28 NOTE — TELEPHONE ENCOUNTER
You do have some infection in the urine  If still having symptoms, I can start an antibiotic    Let me know, confirm pharmacy

## 2019-04-01 ENCOUNTER — TELEPHONE (OUTPATIENT)
Dept: CARDIOLOGY CLINIC | Facility: CLINIC | Age: 79
End: 2019-04-01

## 2019-04-02 LAB
BACTERIA UR CULT: ABNORMAL
BACTERIA UR CULT: ABNORMAL

## 2019-04-12 ENCOUNTER — ANTICOAG VISIT (OUTPATIENT)
Dept: CARDIOLOGY CLINIC | Facility: CLINIC | Age: 79
End: 2019-04-12

## 2019-04-12 ENCOUNTER — APPOINTMENT (OUTPATIENT)
Dept: LAB | Facility: HOSPITAL | Age: 79
End: 2019-04-12
Payer: MEDICARE

## 2019-04-12 ENCOUNTER — TRANSCRIBE ORDERS (OUTPATIENT)
Dept: ADMINISTRATIVE | Facility: HOSPITAL | Age: 79
End: 2019-04-12

## 2019-04-12 DIAGNOSIS — Z79.01 LONG TERM (CURRENT) USE OF ANTICOAGULANTS: Primary | ICD-10-CM

## 2019-04-12 DIAGNOSIS — I48.20 CHRONIC ATRIAL FIBRILLATION (HCC): ICD-10-CM

## 2019-04-12 DIAGNOSIS — I48.91 ATRIAL FIBRILLATION, UNSPECIFIED TYPE (HCC): ICD-10-CM

## 2019-04-12 LAB
INR PPP: 2.67 (ref 0.86–1.16)
PROTHROMBIN TIME: 26.5 SECONDS (ref 9.4–11.7)

## 2019-04-12 PROCEDURE — 36415 COLL VENOUS BLD VENIPUNCTURE: CPT

## 2019-04-12 PROCEDURE — 85610 PROTHROMBIN TIME: CPT

## 2019-04-17 DIAGNOSIS — I48.20 CHRONIC ATRIAL FIBRILLATION (HCC): ICD-10-CM

## 2019-04-17 RX ORDER — METOPROLOL TARTRATE 75 MG/1
75 TABLET, FILM COATED ORAL 2 TIMES DAILY
Qty: 60 TABLET | Refills: 6 | Status: SHIPPED | OUTPATIENT
Start: 2019-04-17 | End: 2019-07-08 | Stop reason: SDUPTHER

## 2019-04-18 ENCOUNTER — OFFICE VISIT (OUTPATIENT)
Dept: INTERNAL MEDICINE CLINIC | Facility: CLINIC | Age: 79
End: 2019-04-18
Payer: MEDICARE

## 2019-04-18 VITALS
TEMPERATURE: 97.9 F | HEIGHT: 64 IN | SYSTOLIC BLOOD PRESSURE: 132 MMHG | BODY MASS INDEX: 41.21 KG/M2 | DIASTOLIC BLOOD PRESSURE: 80 MMHG | WEIGHT: 241.4 LBS | OXYGEN SATURATION: 97 % | RESPIRATION RATE: 18 BRPM | HEART RATE: 80 BPM

## 2019-04-18 DIAGNOSIS — R21 SKIN RASH: Primary | ICD-10-CM

## 2019-04-18 PROCEDURE — 99213 OFFICE O/P EST LOW 20 MIN: CPT | Performed by: INTERNAL MEDICINE

## 2019-04-23 ENCOUNTER — REMOTE DEVICE CLINIC VISIT (OUTPATIENT)
Dept: CARDIOLOGY CLINIC | Facility: CLINIC | Age: 79
End: 2019-04-23
Payer: MEDICARE

## 2019-04-23 DIAGNOSIS — Z95.0 PRESENCE OF PERMANENT CARDIAC PACEMAKER: ICD-10-CM

## 2019-04-23 DIAGNOSIS — I48.20 CHRONIC ATRIAL FIBRILLATION (HCC): Primary | ICD-10-CM

## 2019-04-23 PROCEDURE — 93296 REM INTERROG EVL PM/IDS: CPT | Performed by: INTERNAL MEDICINE

## 2019-04-23 PROCEDURE — 93294 REM INTERROG EVL PM/LDLS PM: CPT | Performed by: INTERNAL MEDICINE

## 2019-04-30 ENCOUNTER — APPOINTMENT (OUTPATIENT)
Dept: LAB | Facility: HOSPITAL | Age: 79
End: 2019-04-30
Payer: MEDICARE

## 2019-04-30 ENCOUNTER — ANTICOAG VISIT (OUTPATIENT)
Dept: CARDIOLOGY CLINIC | Facility: CLINIC | Age: 79
End: 2019-04-30

## 2019-04-30 DIAGNOSIS — I48.20 CHRONIC ATRIAL FIBRILLATION (HCC): ICD-10-CM

## 2019-04-30 DIAGNOSIS — I48.91 ATRIAL FIBRILLATION, UNSPECIFIED TYPE (HCC): ICD-10-CM

## 2019-04-30 LAB
INR PPP: 2.35 (ref 0.86–1.16)
PROTHROMBIN TIME: 23.5 SECONDS (ref 9.4–11.7)

## 2019-04-30 PROCEDURE — 36415 COLL VENOUS BLD VENIPUNCTURE: CPT

## 2019-04-30 PROCEDURE — 85610 PROTHROMBIN TIME: CPT

## 2019-05-07 DIAGNOSIS — I48.19 PERSISTENT ATRIAL FIBRILLATION (HCC): ICD-10-CM

## 2019-05-07 RX ORDER — WARFARIN SODIUM 5 MG/1
TABLET ORAL
Qty: 180 TABLET | Refills: 10 | Status: SHIPPED | OUTPATIENT
Start: 2019-05-07 | End: 2019-06-06

## 2019-05-08 ENCOUNTER — OFFICE VISIT (OUTPATIENT)
Dept: PODIATRY | Facility: CLINIC | Age: 79
End: 2019-05-08
Payer: MEDICARE

## 2019-05-08 VITALS
HEART RATE: 80 BPM | WEIGHT: 241 LBS | HEIGHT: 64 IN | SYSTOLIC BLOOD PRESSURE: 132 MMHG | RESPIRATION RATE: 17 BRPM | BODY MASS INDEX: 41.15 KG/M2 | DIASTOLIC BLOOD PRESSURE: 80 MMHG

## 2019-05-08 DIAGNOSIS — E11.42 DIABETIC POLYNEUROPATHY ASSOCIATED WITH TYPE 2 DIABETES MELLITUS (HCC): Primary | ICD-10-CM

## 2019-05-08 DIAGNOSIS — M79.672 PAIN IN BOTH FEET: ICD-10-CM

## 2019-05-08 DIAGNOSIS — M79.671 PAIN IN BOTH FEET: ICD-10-CM

## 2019-05-08 DIAGNOSIS — L84 CORNS: ICD-10-CM

## 2019-05-08 DIAGNOSIS — I87.2 DEEP VENOUS INSUFFICIENCY: ICD-10-CM

## 2019-05-08 DIAGNOSIS — R60.9 CHRONIC EDEMA: ICD-10-CM

## 2019-05-08 DIAGNOSIS — I70.209 PERIPHERAL ARTERIOSCLEROSIS (HCC): ICD-10-CM

## 2019-05-08 PROCEDURE — 11056 PARNG/CUTG B9 HYPRKR LES 2-4: CPT | Performed by: PODIATRIST

## 2019-05-08 PROCEDURE — 99212 OFFICE O/P EST SF 10 MIN: CPT | Performed by: PODIATRIST

## 2019-05-08 PROCEDURE — 11720 DEBRIDE NAIL 1-5: CPT | Performed by: PODIATRIST

## 2019-05-13 ENCOUNTER — TELEPHONE (OUTPATIENT)
Dept: INTERNAL MEDICINE CLINIC | Facility: CLINIC | Age: 79
End: 2019-05-13

## 2019-05-14 ENCOUNTER — APPOINTMENT (OUTPATIENT)
Dept: LAB | Facility: CLINIC | Age: 79
End: 2019-05-14
Payer: MEDICARE

## 2019-05-14 ENCOUNTER — TRANSCRIBE ORDERS (OUTPATIENT)
Dept: LAB | Facility: CLINIC | Age: 79
End: 2019-05-14

## 2019-05-14 ENCOUNTER — OFFICE VISIT (OUTPATIENT)
Dept: INTERNAL MEDICINE CLINIC | Facility: CLINIC | Age: 79
End: 2019-05-14
Payer: MEDICARE

## 2019-05-14 VITALS
RESPIRATION RATE: 18 BRPM | SYSTOLIC BLOOD PRESSURE: 110 MMHG | BODY MASS INDEX: 40.46 KG/M2 | DIASTOLIC BLOOD PRESSURE: 70 MMHG | HEIGHT: 64 IN | HEART RATE: 78 BPM | OXYGEN SATURATION: 96 % | WEIGHT: 237 LBS | TEMPERATURE: 96.7 F

## 2019-05-14 DIAGNOSIS — G62.89 OTHER POLYNEUROPATHY: ICD-10-CM

## 2019-05-14 DIAGNOSIS — E11.42 DIABETIC POLYNEUROPATHY ASSOCIATED WITH TYPE 2 DIABETES MELLITUS (HCC): ICD-10-CM

## 2019-05-14 DIAGNOSIS — I10 ESSENTIAL HYPERTENSION: ICD-10-CM

## 2019-05-14 DIAGNOSIS — Z79.899 ENCOUNTER FOR LONG-TERM CURRENT USE OF MEDICATION: ICD-10-CM

## 2019-05-14 DIAGNOSIS — G50.1 ATYPICAL FACIAL PAIN: Primary | ICD-10-CM

## 2019-05-14 LAB
ALBUMIN SERPL BCP-MCNC: 3.7 G/DL (ref 3.5–5)
ALP SERPL-CCNC: 55 U/L (ref 46–116)
ALT SERPL W P-5'-P-CCNC: 27 U/L (ref 12–78)
ANION GAP SERPL CALCULATED.3IONS-SCNC: 11 MMOL/L (ref 4–13)
AST SERPL W P-5'-P-CCNC: 23 U/L (ref 5–45)
BACTERIA UR QL AUTO: ABNORMAL /HPF
BASOPHILS # BLD AUTO: 0.05 THOUSANDS/ΜL (ref 0–0.1)
BASOPHILS NFR BLD AUTO: 1 % (ref 0–1)
BILIRUB SERPL-MCNC: 0.4 MG/DL (ref 0.2–1)
BILIRUB UR QL STRIP: NEGATIVE
BUN SERPL-MCNC: 26 MG/DL (ref 5–25)
CALCIUM SERPL-MCNC: 9.4 MG/DL (ref 8.3–10.1)
CHLORIDE SERPL-SCNC: 103 MMOL/L (ref 100–108)
CLARITY UR: CLEAR
CO2 SERPL-SCNC: 26 MMOL/L (ref 21–32)
COLOR UR: ABNORMAL
CREAT SERPL-MCNC: 1.06 MG/DL (ref 0.6–1.3)
EOSINOPHIL # BLD AUTO: 0 THOUSAND/ΜL (ref 0–0.61)
EOSINOPHIL NFR BLD AUTO: 0 % (ref 0–6)
ERYTHROCYTE [DISTWIDTH] IN BLOOD BY AUTOMATED COUNT: 13.9 % (ref 11.6–15.1)
ERYTHROCYTE [SEDIMENTATION RATE] IN BLOOD: 16 MM/HOUR (ref 0–20)
GFR SERPL CREATININE-BSD FRML MDRD: 51 ML/MIN/1.73SQ M
GLUCOSE P FAST SERPL-MCNC: 88 MG/DL (ref 65–99)
GLUCOSE UR STRIP-MCNC: NEGATIVE MG/DL
HCT VFR BLD AUTO: 41.5 % (ref 34.8–46.1)
HGB BLD-MCNC: 13.1 G/DL (ref 11.5–15.4)
HGB UR QL STRIP.AUTO: NEGATIVE
IMM GRANULOCYTES # BLD AUTO: 0.01 THOUSAND/UL (ref 0–0.2)
IMM GRANULOCYTES NFR BLD AUTO: 0 % (ref 0–2)
KETONES UR STRIP-MCNC: NEGATIVE MG/DL
LEUKOCYTE ESTERASE UR QL STRIP: ABNORMAL
LYMPHOCYTES # BLD AUTO: 2 THOUSANDS/ΜL (ref 0.6–4.47)
LYMPHOCYTES NFR BLD AUTO: 24 % (ref 14–44)
MCH RBC QN AUTO: 30.5 PG (ref 26.8–34.3)
MCHC RBC AUTO-ENTMCNC: 31.6 G/DL (ref 31.4–37.4)
MCV RBC AUTO: 97 FL (ref 82–98)
MONOCYTES # BLD AUTO: 0.75 THOUSAND/ΜL (ref 0.17–1.22)
MONOCYTES NFR BLD AUTO: 9 % (ref 4–12)
NEUTROPHILS # BLD AUTO: 5.7 THOUSANDS/ΜL (ref 1.85–7.62)
NEUTS SEG NFR BLD AUTO: 66 % (ref 43–75)
NITRITE UR QL STRIP: NEGATIVE
NON-SQ EPI CELLS URNS QL MICRO: ABNORMAL /HPF
NRBC BLD AUTO-RTO: 0 /100 WBCS
PH UR STRIP.AUTO: 5.5 [PH]
PLATELET # BLD AUTO: 219 THOUSANDS/UL (ref 149–390)
PMV BLD AUTO: 11.2 FL (ref 8.9–12.7)
POTASSIUM SERPL-SCNC: 4.5 MMOL/L (ref 3.5–5.3)
PROT SERPL-MCNC: 7.5 G/DL (ref 6.4–8.2)
PROT UR STRIP-MCNC: NEGATIVE MG/DL
RBC # BLD AUTO: 4.3 MILLION/UL (ref 3.81–5.12)
RBC #/AREA URNS AUTO: ABNORMAL /HPF
SODIUM SERPL-SCNC: 140 MMOL/L (ref 136–145)
SP GR UR STRIP.AUTO: <=1.005 (ref 1–1.03)
TSH SERPL DL<=0.05 MIU/L-ACNC: 0.75 UIU/ML (ref 0.36–3.74)
UROBILINOGEN UR QL STRIP.AUTO: 0.2 E.U./DL
VIT B12 SERPL-MCNC: 755 PG/ML (ref 100–900)
WBC # BLD AUTO: 8.51 THOUSAND/UL (ref 4.31–10.16)
WBC #/AREA URNS AUTO: ABNORMAL /HPF

## 2019-05-14 PROCEDURE — 82607 VITAMIN B-12: CPT | Performed by: INTERNAL MEDICINE

## 2019-05-14 PROCEDURE — 85652 RBC SED RATE AUTOMATED: CPT | Performed by: INTERNAL MEDICINE

## 2019-05-14 PROCEDURE — 80053 COMPREHEN METABOLIC PANEL: CPT | Performed by: INTERNAL MEDICINE

## 2019-05-14 PROCEDURE — 99213 OFFICE O/P EST LOW 20 MIN: CPT | Performed by: INTERNAL MEDICINE

## 2019-05-14 PROCEDURE — 81001 URINALYSIS AUTO W/SCOPE: CPT

## 2019-05-14 PROCEDURE — 36415 COLL VENOUS BLD VENIPUNCTURE: CPT | Performed by: INTERNAL MEDICINE

## 2019-05-14 PROCEDURE — 85025 COMPLETE CBC W/AUTO DIFF WBC: CPT | Performed by: INTERNAL MEDICINE

## 2019-05-14 PROCEDURE — 84443 ASSAY THYROID STIM HORMONE: CPT | Performed by: INTERNAL MEDICINE

## 2019-05-15 ENCOUNTER — TELEPHONE (OUTPATIENT)
Dept: INTERNAL MEDICINE CLINIC | Facility: CLINIC | Age: 79
End: 2019-05-15

## 2019-05-15 DIAGNOSIS — I48.91 ATRIAL FIBRILLATION, UNSPECIFIED TYPE (HCC): ICD-10-CM

## 2019-05-15 PROCEDURE — 2022F DILAT RTA XM EVC RTNOPTHY: CPT | Performed by: INTERNAL MEDICINE

## 2019-05-15 PROCEDURE — 3044F HG A1C LEVEL LT 7.0%: CPT | Performed by: INTERNAL MEDICINE

## 2019-05-15 RX ORDER — FUROSEMIDE 40 MG/1
TABLET ORAL
Qty: 90 TABLET | Refills: 0 | Status: SHIPPED | OUTPATIENT
Start: 2019-05-15 | End: 2019-09-08 | Stop reason: SDUPTHER

## 2019-05-29 ENCOUNTER — ANTICOAG VISIT (OUTPATIENT)
Dept: CARDIOLOGY CLINIC | Facility: CLINIC | Age: 79
End: 2019-05-29

## 2019-05-29 ENCOUNTER — APPOINTMENT (OUTPATIENT)
Dept: LAB | Facility: HOSPITAL | Age: 79
End: 2019-05-29
Payer: MEDICARE

## 2019-05-29 ENCOUNTER — TRANSCRIBE ORDERS (OUTPATIENT)
Dept: ADMINISTRATIVE | Facility: HOSPITAL | Age: 79
End: 2019-05-29

## 2019-05-29 DIAGNOSIS — I48.20 CHRONIC ATRIAL FIBRILLATION (HCC): ICD-10-CM

## 2019-05-29 DIAGNOSIS — I48.91 ATRIAL FIBRILLATION, UNSPECIFIED TYPE (HCC): ICD-10-CM

## 2019-05-29 DIAGNOSIS — I48.91 ATRIAL FIBRILLATION, UNSPECIFIED TYPE (HCC): Primary | ICD-10-CM

## 2019-05-29 LAB
INR PPP: 3.17 (ref 0.86–1.16)
PROTHROMBIN TIME: 31.2 SECONDS (ref 9.4–11.7)

## 2019-05-29 PROCEDURE — 85610 PROTHROMBIN TIME: CPT

## 2019-05-29 PROCEDURE — 36415 COLL VENOUS BLD VENIPUNCTURE: CPT

## 2019-06-06 ENCOUNTER — HOSPITAL ENCOUNTER (EMERGENCY)
Facility: HOSPITAL | Age: 79
Discharge: HOME | End: 2019-06-06
Attending: EMERGENCY MEDICINE
Payer: MEDICARE

## 2019-06-06 ENCOUNTER — APPOINTMENT (EMERGENCY)
Dept: RADIOLOGY | Facility: HOSPITAL | Age: 79
End: 2019-06-06
Payer: MEDICARE

## 2019-06-06 VITALS
HEIGHT: 64 IN | BODY MASS INDEX: 39.95 KG/M2 | DIASTOLIC BLOOD PRESSURE: 68 MMHG | OXYGEN SATURATION: 96 % | HEART RATE: 68 BPM | SYSTOLIC BLOOD PRESSURE: 122 MMHG | WEIGHT: 234 LBS | RESPIRATION RATE: 18 BRPM

## 2019-06-06 DIAGNOSIS — R07.9 CHEST PAIN: Primary | ICD-10-CM

## 2019-06-06 DIAGNOSIS — K21.9 GERD (GASTROESOPHAGEAL REFLUX DISEASE): ICD-10-CM

## 2019-06-06 LAB
ALBUMIN SERPL BCP-MCNC: 3.9 G/DL (ref 3.5–5)
ALP SERPL-CCNC: 64 U/L (ref 46–116)
ALT SERPL W P-5'-P-CCNC: 26 U/L (ref 12–78)
ANION GAP SERPL CALCULATED.3IONS-SCNC: 8 MMOL/L (ref 4–13)
APTT PPP: 39 SECONDS (ref 26–38)
AST SERPL W P-5'-P-CCNC: 20 U/L (ref 5–45)
BASOPHILS # BLD AUTO: 0.05 THOUSANDS/ΜL (ref 0–0.1)
BASOPHILS NFR BLD AUTO: 1 % (ref 0–1)
BILIRUB SERPL-MCNC: 0.7 MG/DL (ref 0.2–1)
BUN SERPL-MCNC: 25 MG/DL (ref 5–25)
CALCIUM SERPL-MCNC: 9.3 MG/DL (ref 8.3–10.1)
CHLORIDE SERPL-SCNC: 104 MMOL/L (ref 100–108)
CO2 SERPL-SCNC: 28 MMOL/L (ref 21–32)
CREAT SERPL-MCNC: 0.94 MG/DL (ref 0.6–1.3)
EOSINOPHIL # BLD AUTO: 0 THOUSAND/ΜL (ref 0–0.61)
EOSINOPHIL NFR BLD AUTO: 0 % (ref 0–6)
ERYTHROCYTE [DISTWIDTH] IN BLOOD BY AUTOMATED COUNT: 13.7 % (ref 11.6–15.1)
GFR SERPL CREATININE-BSD FRML MDRD: 59 ML/MIN/1.73SQ M
GLUCOSE SERPL-MCNC: 93 MG/DL (ref 65–140)
HCT VFR BLD AUTO: 41.3 % (ref 34.8–46.1)
HGB BLD-MCNC: 13.3 G/DL (ref 11.5–15.4)
IMM GRANULOCYTES # BLD AUTO: 0.02 THOUSAND/UL (ref 0–0.2)
IMM GRANULOCYTES NFR BLD AUTO: 0 % (ref 0–2)
INR PPP: 2.74 (ref 0.86–1.16)
LYMPHOCYTES # BLD AUTO: 2.07 THOUSANDS/ΜL (ref 0.6–4.47)
LYMPHOCYTES NFR BLD AUTO: 24 % (ref 14–44)
MCH RBC QN AUTO: 30.8 PG (ref 26.8–34.3)
MCHC RBC AUTO-ENTMCNC: 32.2 G/DL (ref 31.4–37.4)
MCV RBC AUTO: 96 FL (ref 82–98)
MONOCYTES # BLD AUTO: 0.72 THOUSAND/ΜL (ref 0.17–1.22)
MONOCYTES NFR BLD AUTO: 8 % (ref 4–12)
NEUTROPHILS # BLD AUTO: 5.73 THOUSANDS/ΜL (ref 1.85–7.62)
NEUTS SEG NFR BLD AUTO: 67 % (ref 43–75)
NRBC BLD AUTO-RTO: 0 /100 WBCS
PLATELET # BLD AUTO: 231 THOUSANDS/UL (ref 149–390)
PMV BLD AUTO: 11.5 FL (ref 8.9–12.7)
POTASSIUM SERPL-SCNC: 4.4 MMOL/L (ref 3.5–5.3)
PROT SERPL-MCNC: 7.8 G/DL (ref 6.4–8.2)
PROTHROMBIN TIME: 27.2 SECONDS (ref 9.4–11.7)
RBC # BLD AUTO: 4.32 MILLION/UL (ref 3.81–5.12)
SODIUM SERPL-SCNC: 140 MMOL/L (ref 136–145)
TROPONIN I SERPL-MCNC: <0.02 NG/ML
TROPONIN I SERPL-MCNC: <0.02 NG/ML
WBC # BLD AUTO: 8.59 THOUSAND/UL (ref 4.31–10.16)

## 2019-06-06 PROCEDURE — C9113 INJ PANTOPRAZOLE SODIUM, VIA: HCPCS | Performed by: EMERGENCY MEDICINE

## 2019-06-06 PROCEDURE — 96374 THER/PROPH/DIAG INJ IV PUSH: CPT

## 2019-06-06 PROCEDURE — 85610 PROTHROMBIN TIME: CPT

## 2019-06-06 PROCEDURE — 71045 X-RAY EXAM CHEST 1 VIEW: CPT

## 2019-06-06 PROCEDURE — 85025 COMPLETE CBC W/AUTO DIFF WBC: CPT

## 2019-06-06 PROCEDURE — 84484 ASSAY OF TROPONIN QUANT: CPT | Performed by: EMERGENCY MEDICINE

## 2019-06-06 PROCEDURE — 99285 EMERGENCY DEPT VISIT HI MDM: CPT

## 2019-06-06 PROCEDURE — 36415 COLL VENOUS BLD VENIPUNCTURE: CPT

## 2019-06-06 PROCEDURE — 80053 COMPREHEN METABOLIC PANEL: CPT

## 2019-06-06 PROCEDURE — 84484 ASSAY OF TROPONIN QUANT: CPT

## 2019-06-06 PROCEDURE — 85730 THROMBOPLASTIN TIME PARTIAL: CPT

## 2019-06-06 PROCEDURE — 93005 ELECTROCARDIOGRAM TRACING: CPT

## 2019-06-06 RX ORDER — PANTOPRAZOLE SODIUM 40 MG/1
40 INJECTION, POWDER, FOR SOLUTION INTRAVENOUS ONCE
Status: COMPLETED | OUTPATIENT
Start: 2019-06-06 | End: 2019-06-06

## 2019-06-06 RX ORDER — WARFARIN SODIUM 7.5 MG/1
7.5 TABLET ORAL
COMMUNITY
End: 2020-07-30 | Stop reason: SDUPTHER

## 2019-06-06 RX ORDER — PANTOPRAZOLE SODIUM 20 MG/1
20 TABLET, DELAYED RELEASE ORAL DAILY
Qty: 30 TABLET | Refills: 0 | Status: SHIPPED | OUTPATIENT
Start: 2019-06-06 | End: 2019-11-18 | Stop reason: ALTCHOICE

## 2019-06-06 RX ADMIN — PANTOPRAZOLE SODIUM 40 MG: 40 INJECTION, POWDER, FOR SOLUTION INTRAVENOUS at 09:34

## 2019-06-07 LAB
ATRIAL RATE: 97 BPM
QRS AXIS: 0 DEGREES
QRSD INTERVAL: 96 MS
QT INTERVAL: 382 MS
QTC INTERVAL: 454 MS
T WAVE AXIS: -29 DEGREES
VENTRICULAR RATE: 85 BPM

## 2019-06-07 PROCEDURE — 93010 ELECTROCARDIOGRAM REPORT: CPT | Performed by: INTERNAL MEDICINE

## 2019-06-10 ENCOUNTER — ANTICOAG VISIT (OUTPATIENT)
Dept: CARDIOLOGY CLINIC | Facility: CLINIC | Age: 79
End: 2019-06-10

## 2019-06-10 DIAGNOSIS — I48.20 CHRONIC ATRIAL FIBRILLATION (HCC): ICD-10-CM

## 2019-06-12 ENCOUNTER — OFFICE VISIT (OUTPATIENT)
Dept: NEUROLOGY | Facility: CLINIC | Age: 79
End: 2019-06-12
Payer: MEDICARE

## 2019-06-12 VITALS
BODY MASS INDEX: 39.95 KG/M2 | HEIGHT: 64 IN | SYSTOLIC BLOOD PRESSURE: 114 MMHG | DIASTOLIC BLOOD PRESSURE: 82 MMHG | WEIGHT: 234 LBS | HEART RATE: 79 BPM

## 2019-06-12 DIAGNOSIS — G25.81 RLS (RESTLESS LEGS SYNDROME): ICD-10-CM

## 2019-06-12 DIAGNOSIS — G60.8 SENSORY POLYNEUROPATHY: ICD-10-CM

## 2019-06-12 DIAGNOSIS — G62.89 OTHER POLYNEUROPATHY: Primary | ICD-10-CM

## 2019-06-12 PROCEDURE — 99214 OFFICE O/P EST MOD 30 MIN: CPT | Performed by: PSYCHIATRY & NEUROLOGY

## 2019-06-12 RX ORDER — PRAMIPEXOLE DIHYDROCHLORIDE 0.5 MG/1
TABLET ORAL
Qty: 270 TABLET | Refills: 0 | Status: SHIPPED | OUTPATIENT
Start: 2019-06-12 | End: 2019-10-23 | Stop reason: SDUPTHER

## 2019-06-12 RX ORDER — GABAPENTIN 800 MG/1
800 TABLET ORAL 4 TIMES DAILY
Qty: 360 TABLET | Refills: 0 | Status: SHIPPED | OUTPATIENT
Start: 2019-06-12 | End: 2019-10-23 | Stop reason: SDUPTHER

## 2019-07-01 ENCOUNTER — TRANSCRIBE ORDERS (OUTPATIENT)
Dept: ADMINISTRATIVE | Facility: HOSPITAL | Age: 79
End: 2019-07-01

## 2019-07-01 ENCOUNTER — APPOINTMENT (OUTPATIENT)
Dept: LAB | Facility: HOSPITAL | Age: 79
End: 2019-07-01
Payer: MEDICARE

## 2019-07-01 ENCOUNTER — ANTICOAG VISIT (OUTPATIENT)
Dept: CARDIOLOGY CLINIC | Facility: CLINIC | Age: 79
End: 2019-07-01

## 2019-07-01 DIAGNOSIS — I48.20 CHRONIC ATRIAL FIBRILLATION (HCC): ICD-10-CM

## 2019-07-01 DIAGNOSIS — I48.91 ATRIAL FIBRILLATION, UNSPECIFIED TYPE (HCC): ICD-10-CM

## 2019-07-01 LAB
INR PPP: 2.59 (ref 0.86–1.16)
PROTHROMBIN TIME: 25.8 SECONDS (ref 9.4–11.7)

## 2019-07-01 PROCEDURE — 36415 COLL VENOUS BLD VENIPUNCTURE: CPT

## 2019-07-01 PROCEDURE — 85610 PROTHROMBIN TIME: CPT

## 2019-07-03 NOTE — TELEPHONE ENCOUNTER
Called and left message for Louie Malone to call back so I can schedule her injections for her 
Called and left message for Louie Malone to call back so I can schedule her injections for her 
I will call patient to schedule her for Euflexxa Injections for both knees 
Looks like patient has been scheduled for her first euflexxa by the phone room  This task is now completed 
Patient sees Dr Jonatan Mancia  She is asking if gel injections could be authorized and ordered for both knees       Call [facility]#[phone]
27-Nov-2019

## 2019-07-08 DIAGNOSIS — I48.20 CHRONIC ATRIAL FIBRILLATION (HCC): ICD-10-CM

## 2019-07-08 RX ORDER — METOPROLOL TARTRATE 50 MG/1
TABLET, FILM COATED ORAL
Qty: 270 TABLET | Refills: 2 | Status: SHIPPED | OUTPATIENT
Start: 2019-07-08 | End: 2020-04-17 | Stop reason: SDUPTHER

## 2019-07-17 ENCOUNTER — OFFICE VISIT (OUTPATIENT)
Dept: PODIATRY | Facility: CLINIC | Age: 79
End: 2019-07-17
Payer: MEDICARE

## 2019-07-17 VITALS
RESPIRATION RATE: 16 BRPM | WEIGHT: 234 LBS | HEART RATE: 68 BPM | DIASTOLIC BLOOD PRESSURE: 75 MMHG | BODY MASS INDEX: 39.95 KG/M2 | HEIGHT: 64 IN | SYSTOLIC BLOOD PRESSURE: 120 MMHG

## 2019-07-17 DIAGNOSIS — E11.51 TYPE 2 DIABETES MELLITUS WITH DIABETIC PERIPHERAL ANGIOPATHY WITHOUT GANGRENE, WITHOUT LONG-TERM CURRENT USE OF INSULIN (HCC): Primary | ICD-10-CM

## 2019-07-17 DIAGNOSIS — M79.672 PAIN IN BOTH FEET: ICD-10-CM

## 2019-07-17 DIAGNOSIS — B35.1 ONYCHOMYCOSIS: ICD-10-CM

## 2019-07-17 DIAGNOSIS — M21.969 ACQUIRED DEFORMITY OF FOOT, UNSPECIFIED LATERALITY: ICD-10-CM

## 2019-07-17 DIAGNOSIS — M79.671 PAIN IN BOTH FEET: ICD-10-CM

## 2019-07-17 DIAGNOSIS — R60.9 CHRONIC EDEMA: ICD-10-CM

## 2019-07-17 DIAGNOSIS — M54.16 RADICULOPATHY OF LUMBAR REGION: ICD-10-CM

## 2019-07-17 PROCEDURE — 99213 OFFICE O/P EST LOW 20 MIN: CPT | Performed by: PODIATRIST

## 2019-07-17 NOTE — PROGRESS NOTES
Assessment/Plan:  Painful calluses   Diabetic neuropathy   Peripheral vascular disease  Chronic edema  Plan   Diabetic foot exam performed   All mycotic nails debrided   Plantar calluses debrided without pain or complication  Doppler testing will be ordered to rule out deep venous insufficiency  Patient may need vascular surgery workup      Diagnoses and all orders for this visit:     Diabetic polyneuropathy associated with type 2 diabetes mellitus (HCC)     Corns     Pain in both feet     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HCC)     Chronic edema  Rule out deep venous insufficiency       Discussion/Summary  The patient was counseled regarding instructions for management,-- prognosis,-- patient and family education,-- risks and benefits of treatment options,-- importance of compliance with treatment  Patient is able to Self-Care  Possible side effects of new medications were reviewed with the patient/guardian today  The treatment plan was reviewed with the patient/guardian  The patient/guardian understands and agrees with the treatment plan      Chief Complaint  Routine nail care      History of Present Illness  HPI: Patient is doing well with Cymbalta however she began have facial tingling  She discontinued medicine   However the medication was relieving her neuropathic pain       Review of Systems     ROS reviewed       Active Problems     1  Achilles tendinitis of left lower extremity (726 71) (M76 62)   2  Acquired ankle/foot deformity (736 70) (M21 969)   3  AF (paroxysmal atrial fibrillation) (427 31) (I48 0)   4  Anticoagulant long-term use (V58 61) (Z79 01)   5  Arthritis (716 90) (M19 90)   6  At risk for bone density loss (V49 89) (Z91 89)   7  Atrial fibrillation (427 31) (I48 91)   8  History of Breast Cancer (V10 3)   9  Difficulty walking (719 7) (R26 2)   10  Encounter for screening mammogram for breast cancer (V76 12) (Z12 31)   11  Esophageal reflux (530 81) (K21 9)   12  Foot pain, bilateral (729 5) (M79 671,M79 672)   13  Hiatal hernia (553 3) (K44 9)   14  History of Carrero's esophagus (V12 79) (Z87 19)   15  History of fall (V15 88) (Z91 81)   16  Hypertension (401 9) (I10)   17  Leg pain, left (729 5) (X43 944)   18  Lichen sclerosus et atrophicus (701 0) (L90 0)   19  Lumbar radiculopathy (724 4) (M54 16)   20  Multiple lung nodules (793 19) (R91 8)   21  Obesity (278 00) (E66 9)   22  Onychomycosis (110 1) (B35 1)   23  Osteopenia (733 90) (M85 80)   24  Pacemaker (V45 01) (Z95 0)   25  Peripheral neuropathy (356 9) (G62 9)   26  Pes planus of both feet (734) (M21 41,M21 42)   27  Plantar fasciitis of left foot (728 71) (M72 2)   28  Restless legs syndrome (333 94) (G25 81)     Past Medical History   · History of Abnormal glucose (790 29) (R73 09)   · Atrial fibrillation (427 31) (I48 91)   · History of Breast Cancer (V10 3)   · 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)   · Esophageal reflux (530 81) (K21 9)   · Denied: History of Exposure To STD   · History of Gross hematuria (599 71) (R31 0)   · History of Hematoma (924 9) (T14 8XXA)   · History of Hematoma of lower extremity, right, initial encounter (924 5) (S80 11XA)   · History of backache (V13 59) (Z87 39)   · History of Carrero's esophagus (V12 79) (Z87 19)   · History of cataract (V12 49) (Z86 69)   · History of chest pain (V13 89) (Z87 898)   · History of fall (V15 88) (Z91 81)   · History of hematuria (V13 09) (Z87 448)   · History of postmenopausal hormone replacement therapy (V87 49) (Z92 29)   · History of shortness of breath (V13 89) (K33 703)   · History of urinary incontinence (V13 09) (Z87 898)   · History of Neck pain (723 1) (M54 2)   · Normal delivery (650) (O80,Z37  9)   · History of Right knee pain (719 46) (M25 561)   · History of Ringing In The Ears (Tinnitus) (388 30)   · History of Skin rash (782 1) (R21)   · History of Traumatic blister of right lower extremity, initial encounter (916 2) (O06 152R)   · History of UTI (lower urinary tract infection) (599 0) (N39 0)     The active problems and past medical history were reviewed and updated today       Surgical History   · Denied: History of Abnormal Pap Smear Of Cervix   · History of Breast Surgery Lumpectomy   · History of Cataract Surgery   · History of Diagnostic Cystoscopy   · History of Excision Lesion Of Meniscus Or Capsule Knee   · History of Pacemaker - Pulse Generator Replacement   · History of Pacemaker Placement   · History of Pacemaker Placement   · History of Radiation Therapy   · History of Total Abdominal Hysterectomy With Removal Of Both Ovaries     The surgical history was reviewed and updated today        Family History  Mother    · Denied: Family history of Alcoholism and drug addiction in family   · Denied: Family history of Anxiety and depression  Father    · Denied: Family history of Alcoholism and drug addiction in family   · Denied: Family history of Anxiety and depression   · Family history of Coronary Artery Disease (V17 49)   · Family history of abdominal aortic aneurysm (V17 49) (Z82 49)  Child    · Denied: Family history of Alcoholism and drug addiction in family   · Denied: Family history of Anxiety and depression  Sibling    · Denied: Family history of Alcoholism and drug addiction in family   · Denied: Family history of Anxiety and depression  Sister    · Family history of Breast Cancer (V16 3)  Maternal Aunt    · Family history of Colon Cancer (V16 0)   · Family history of Colon Cancer (V16 0)  Family History    · Denied: Family history of Diabetes Mellitus   · Denied: Family history of Stroke Syndrome     The family history was reviewed and updated today        Social History      · Being A Social Drinker   · Denied: History of Drug Use   · Former smoker (V15 82) (T51 906)   · 25 pack years, quit age 41   · Marital History - Currently    · Retired From Work   · 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  The social history was reviewed and updated today       The medication list was reviewed and updated today        Allergies  1  duloxetine   2  LevoFLOXacin TABS   3  Cephalexin CAPS   4  Erythromycin Base TABS   5  Keflex TABS   6  Penicillins   7  Sulfa Drugs        Physical Exam  Left Foot: Appearance: Normal except as noted: excessive pronation-- and-- pes planus  Tenderness: None except the lisfranc joint-- and-- medial longitudinal arch  ROM: subtalar motion was restricted  Right Foot: Appearance: Normal except as noted: excessive pronation-- and-- pes planus  Tenderness: None except the lisfranc joint-- and-- medial longitudinal arch  ROM: subtalar motion was restricted   Left Ankle: ROM: limited ROM in all planes   Right Ankle: ROM: limited ROM in all planes   Neurological Exam: performed  Light touch was decreased bilaterally  Vibratory sensation was decreased in both first metatarsophalangeal joints  Deep tendon reflexes: achilles reflex absent bilateraly-- and-- 4/5 L5 testing bilateral    Vascular Exam: performed Dorsalis pedis pulses were diminished bilaterally  Posterior tibial pulses were diminished bilaterally  Dependence rubor was present bilaterally  Capillary refill time was Negative digital hair noted, but-- between 1-3 seconds bilaterally  Toenails: All of the toenails were elongated,-- hypertrophied,-- discolored-- and--   Hyperkeratosis: present on both first toes  Shoe Gear Evaluation: performed ()  Recommendation(s): SAS style       Patient's shoes and socks removed  Right Foot/Ankle   Right Foot Inspection        Sensory   Vibration: diminished  Proprioception: diminished      Vascular  Capillary refills: elevated        Left Foot/Ankle  Left Foot Inspection                    Sensory   Vibration: diminished  Proprioception: diminished     Vascular  Capillary refills: elevated     Assign Risk Category:  Deformity present; Loss of protective sensation; Weak pulses       Risk: 2

## 2019-07-18 ENCOUNTER — OFFICE VISIT (OUTPATIENT)
Dept: INTERNAL MEDICINE CLINIC | Facility: CLINIC | Age: 79
End: 2019-07-18
Payer: MEDICARE

## 2019-07-18 VITALS
BODY MASS INDEX: 40.08 KG/M2 | DIASTOLIC BLOOD PRESSURE: 78 MMHG | HEIGHT: 64 IN | HEART RATE: 101 BPM | WEIGHT: 234.8 LBS | TEMPERATURE: 98 F | SYSTOLIC BLOOD PRESSURE: 122 MMHG | RESPIRATION RATE: 18 BRPM | OXYGEN SATURATION: 98 %

## 2019-07-18 DIAGNOSIS — Z71.9 HEALTH COUNSELING: ICD-10-CM

## 2019-07-18 DIAGNOSIS — E11.51 TYPE 2 DIABETES MELLITUS WITH DIABETIC PERIPHERAL ANGIOPATHY WITHOUT GANGRENE, WITHOUT LONG-TERM CURRENT USE OF INSULIN (HCC): ICD-10-CM

## 2019-07-18 DIAGNOSIS — E66.01 CLASS 3 SEVERE OBESITY DUE TO EXCESS CALORIES WITH SERIOUS COMORBIDITY AND BODY MASS INDEX (BMI) OF 40.0 TO 44.9 IN ADULT (HCC): ICD-10-CM

## 2019-07-18 DIAGNOSIS — I10 ESSENTIAL HYPERTENSION: ICD-10-CM

## 2019-07-18 DIAGNOSIS — G25.81 RLS (RESTLESS LEGS SYNDROME): ICD-10-CM

## 2019-07-18 DIAGNOSIS — I48.20 CHRONIC ATRIAL FIBRILLATION (HCC): ICD-10-CM

## 2019-07-18 DIAGNOSIS — R06.02 SHORTNESS OF BREATH: Primary | ICD-10-CM

## 2019-07-18 DIAGNOSIS — K21.9 GASTROESOPHAGEAL REFLUX DISEASE, ESOPHAGITIS PRESENCE NOT SPECIFIED: ICD-10-CM

## 2019-07-18 DIAGNOSIS — Z87.891 HISTORY OF TOBACCO USE: ICD-10-CM

## 2019-07-18 PROCEDURE — 99214 OFFICE O/P EST MOD 30 MIN: CPT | Performed by: INTERNAL MEDICINE

## 2019-07-18 NOTE — ASSESSMENT & PLAN NOTE
Stable, on daily furosemide  Encouraged to schedule ultrasound ordered by podiatry  May use compression stockings prn

## 2019-07-18 NOTE — PROGRESS NOTES
Assessment/Plan:    Type 2 diabetes mellitus with diabetic peripheral angiopathy without gangrene ([facility] [address] )  Due for A1c  Not on medication  Atrial fibrillation (HCC) [I48 91]  Intermittent symptoms  On warfarin, metoprolol  RLS (restless legs syndrome)  Symptoms controlled on gabapentin and pramipexole  Sees neurology  Primary osteoarthritis of both knees  S/p injections  Symptoms worse in the winter, takes Tylenol daily  Osteopenia of multiple sites  Bone density due later this year, on supplements  Pedal edema  Stable, on daily furosemide  Encouraged to schedule ultrasound ordered by podiatry  May use compression stockings prn  Class 3 severe obesity due to excess calories with serious comorbidity and body mass index (BMI) of 40 0 to 44 9 in [facility])  Lost 4 lbs since last visit  GERD (gastroesophageal reflux disease)  Takes ranitidine prn, prefers this over pantoprazole  Essential hypertension  BP stable, on lisinopril and metoprolol  Mild cognitive impairment  Sees neurology  Diagnoses and all orders for this visit:    Shortness of breath  Comments:  Intermittent symptoms, usually with activity  Reviewed stress test last year  Orders:  -     Complete pulmonary function test; Future    History of tobacco use  -     Complete pulmonary function test; Future    Gastroesophageal reflux disease, esophagitis presence not specified    Chronic atrial fibrillation (HCC)  -     Basic metabolic panel; Future  -     Lipid panel; Future    RLS (restless legs syndrome)    Essential hypertension  -     Basic metabolic panel; Future  -     Lipid panel;  Future    Type 2 diabetes mellitus with diabetic peripheral angiopathy without gangrene, without long-term current use of insulin (HCC)  -     Hemoglobin A1C; Future    Class 3 severe obesity due to excess calories with serious comorbidity and body mass index (BMI) of 40 0 to 44 9 in [facility])    Health counseling  Comments:  Colonoscopy due 2020  Other orders  -     Acetaminophen (TYLENOL ARTHRITIS PAIN PO); Take 650 mg by mouth 2 (two) times a day      Follow up in 4 months or as needed  Subjective:      Patient ID: Jesus Viera is a 70 y o  female  Mrs Ortiz has been feeling alright  She was at the ER a few months ago, for chest pain  Symptoms have not recurred  She still gets short of breath with activity, no chest pain or dizziness  She complains of intermittent palpitations, attributes this to her AFib  Symptoms do not last too long, says about a few seconds to minutes at a time  She reports 2 instances where in she needed to prop up her pillows when sleeping at night  She denies any nocturnal symptoms, no wheezing  She has chronic bilateral knee pain, has had injections for this in the past   She takes Tylenol every day  She was advised by her foot doctor to do an ultrasound to check her veins  She was unsure whether to do this  She denies any open wounds or recent bleeding  She reports facial symptoms have resolved  The following portions of the patient's history were reviewed and updated as appropriate: allergies, current medications, past medical history, past social history and problem list     Review of Systems   Constitutional: Negative for appetite change and fatigue  HENT: Negative for congestion, ear pain and postnasal drip  Eyes: Negative for visual disturbance  Respiratory: Positive for shortness of breath  Negative for cough, chest tightness and wheezing  Cardiovascular: Positive for palpitations  Negative for chest pain and leg swelling  Gastrointestinal: Negative for abdominal pain, constipation and diarrhea  Genitourinary: Negative for dysuria and frequency  Musculoskeletal: Positive for arthralgias  Negative for gait problem and myalgias  Skin: Negative for rash and wound  Neurological: Negative for dizziness and headaches  Hematological: Bruises/bleeds easily  Psychiatric/Behavioral: Negative for confusion and sleep disturbance  The patient is not nervous/anxious  Objective:      /78   Pulse 101   Temp 98 °F (36 7 °C)   Resp 18   Ht 5' 4" (1 626 m)   Wt 107 kg (234 lb 12 8 oz)   SpO2 98%   BMI 40 30 kg/m²          Physical Exam   Constitutional: She is oriented to person, place, and time  She appears well-developed and well-nourished  HENT:   Head: Normocephalic and atraumatic  Nose: Nose normal    Eyes: Pupils are equal, round, and reactive to light  Conjunctivae are normal    Neck: Neck supple  Cardiovascular: Normal rate, regular rhythm and normal heart sounds  Varicose veins, both LE   Pulmonary/Chest: Effort normal and breath sounds normal  She has no wheezes  She has no rales  Abdominal: Soft  Bowel sounds are normal    Musculoskeletal: She exhibits no edema  Neurological: She is alert and oriented to person, place, and time  Skin: Skin is warm  No rash noted  Psychiatric: She has a normal mood and affect  Her behavior is normal    Nursing note and vitals reviewed  Lab &/or imaging results reviewed with patient  BMI Counseling: Body mass index is 40 3 kg/m²  Discussed the patient's BMI with her  The BMI is above average  BMI counseling and education was provided to the patient  Nutrition recommendations include decreasing overall calorie intake and consuming healthier snacks  Exercise recommendations include exercising 3-5 times per week

## 2019-07-22 ENCOUNTER — TELEPHONE (OUTPATIENT)
Dept: INTERNAL MEDICINE CLINIC | Facility: CLINIC | Age: 79
End: 2019-07-22

## 2019-07-22 ENCOUNTER — HOSPITAL ENCOUNTER (OUTPATIENT)
Dept: PULMONOLOGY | Facility: HOSPITAL | Age: 79
Discharge: HOME | End: 2019-07-22
Payer: MEDICARE

## 2019-07-22 DIAGNOSIS — Z87.891 HISTORY OF TOBACCO USE: ICD-10-CM

## 2019-07-22 DIAGNOSIS — R06.02 SHORTNESS OF BREATH: ICD-10-CM

## 2019-07-22 PROCEDURE — 94760 N-INVAS EAR/PLS OXIMETRY 1: CPT

## 2019-07-22 PROCEDURE — 94729 DIFFUSING CAPACITY: CPT

## 2019-07-22 PROCEDURE — 94726 PLETHYSMOGRAPHY LUNG VOLUMES: CPT

## 2019-07-22 PROCEDURE — 94060 EVALUATION OF WHEEZING: CPT

## 2019-07-22 PROCEDURE — 94729 DIFFUSING CAPACITY: CPT | Performed by: INTERNAL MEDICINE

## 2019-07-22 PROCEDURE — 94726 PLETHYSMOGRAPHY LUNG VOLUMES: CPT | Performed by: INTERNAL MEDICINE

## 2019-07-22 PROCEDURE — 94060 EVALUATION OF WHEEZING: CPT | Performed by: INTERNAL MEDICINE

## 2019-07-22 RX ORDER — ALBUTEROL SULFATE 2.5 MG/3ML
2.5 SOLUTION RESPIRATORY (INHALATION) ONCE
Status: COMPLETED | OUTPATIENT
Start: 2019-07-22 | End: 2019-07-22

## 2019-07-22 RX ADMIN — ALBUTEROL SULFATE 2.5 MG: 2.5 SOLUTION RESPIRATORY (INHALATION) at 08:24

## 2019-07-23 ENCOUNTER — REMOTE DEVICE CLINIC VISIT (OUTPATIENT)
Dept: CARDIOLOGY CLINIC | Facility: CLINIC | Age: 79
End: 2019-07-23
Payer: MEDICARE

## 2019-07-23 DIAGNOSIS — Z95.0 CARDIAC PACEMAKER IN SITU: Primary | ICD-10-CM

## 2019-07-23 PROCEDURE — 93294 REM INTERROG EVL PM/LDLS PM: CPT | Performed by: INTERNAL MEDICINE

## 2019-07-23 PROCEDURE — 93296 REM INTERROG EVL PM/IDS: CPT | Performed by: INTERNAL MEDICINE

## 2019-07-23 NOTE — PROGRESS NOTES
Results for orders placed or performed in visit on 07/23/19   Cardiac EP device report    Narrative    MDT-SINGLE-PM  CARELINK TRANSMISSION: BATTERY STATUS "OK"   15 5%  ALL AVAILABLE LEAD PARAMETERS WITHIN NORMAL LIMITS  MULTIPLE VHRS NOTED  PREDOMINANTLY RVR ON AVAIL EGRAMS HOWEVER CAN'T EXCLUDE NSVT  PT ON METO TART, WARFARIN, & EF 60% (2018)  NORMAL DEVICE FUNCTION   NC

## 2019-08-01 ENCOUNTER — OFFICE VISIT (OUTPATIENT)
Dept: OBGYN CLINIC | Facility: CLINIC | Age: 79
End: 2019-08-01
Payer: MEDICARE

## 2019-08-01 ENCOUNTER — APPOINTMENT (OUTPATIENT)
Dept: LAB | Facility: HOSPITAL | Age: 79
End: 2019-08-01
Payer: MEDICARE

## 2019-08-01 ENCOUNTER — TRANSCRIBE ORDERS (OUTPATIENT)
Dept: ADMINISTRATIVE | Facility: HOSPITAL | Age: 79
End: 2019-08-01

## 2019-08-01 ENCOUNTER — ANTICOAG VISIT (OUTPATIENT)
Dept: CARDIOLOGY CLINIC | Facility: CLINIC | Age: 79
End: 2019-08-01

## 2019-08-01 VITALS
HEIGHT: 64 IN | BODY MASS INDEX: 40.12 KG/M2 | DIASTOLIC BLOOD PRESSURE: 68 MMHG | SYSTOLIC BLOOD PRESSURE: 104 MMHG | HEART RATE: 86 BPM | WEIGHT: 235 LBS

## 2019-08-01 DIAGNOSIS — E66.01 CLASS 3 SEVERE OBESITY DUE TO EXCESS CALORIES WITH SERIOUS COMORBIDITY AND BODY MASS INDEX (BMI) OF 40.0 TO 44.9 IN ADULT (HCC): ICD-10-CM

## 2019-08-01 DIAGNOSIS — I48.20 CHRONIC ATRIAL FIBRILLATION (HCC): ICD-10-CM

## 2019-08-01 DIAGNOSIS — G89.29 CHRONIC PAIN OF BOTH KNEES: ICD-10-CM

## 2019-08-01 DIAGNOSIS — M17.0 BILATERAL PRIMARY OSTEOARTHRITIS OF KNEE: Primary | ICD-10-CM

## 2019-08-01 DIAGNOSIS — R26.2 DIFFICULTY WALKING: ICD-10-CM

## 2019-08-01 DIAGNOSIS — M25.561 CHRONIC PAIN OF BOTH KNEES: ICD-10-CM

## 2019-08-01 DIAGNOSIS — M25.562 CHRONIC PAIN OF BOTH KNEES: ICD-10-CM

## 2019-08-01 PROCEDURE — 1123F ACP DISCUSS/DSCN MKR DOCD: CPT | Performed by: ORTHOPAEDIC SURGERY

## 2019-08-01 PROCEDURE — 99213 OFFICE O/P EST LOW 20 MIN: CPT | Performed by: ORTHOPAEDIC SURGERY

## 2019-08-01 NOTE — PROGRESS NOTES
Assessment/Plan:  1  Bilateral primary osteoarthritis of knee     2  Chronic pain of both knees     3  Class 3 severe obesity due to excess calories with serious comorbidity and body mass index (BMI) of 40 0 to 44 9 in adult ([facility] )     4  Difficulty walking       Radha Pump is a pleasant 62-year-old female presenting today for follow-up of her activity related bilateral knee pain due to her severe underlying osteoarthritis  We had a long discussion today about her arthritis and that she will likely need a total knee replacement  She is apprehensive about undergoing surgery  I did explain that she needs to have a BMI under 40 before were able to offer surgery for her  We dressed her multiple logistical questions about surgery  She is eligible for steroid injections today, but since they were not efficacious, she would like to hold off until she is eligible to return for viscosupplementation  We will plan to see her back in early September to initiate bilateral knee viscosupplementation  In the interim, she will try CBD oil to see if this may mitigate some of her symptoms  After watching her ambulate, I also strongly encouraged her to use a cane or walker for support to avoid falls, as she is not very steady on her feet  She is welcome to call with any questions or concerns  All of her questions were addressed today  Subjective: Bilateral knee follow up    Patient ID: Cha Munoz is a 70 y o  female  Radha Pump is a pleasant 62-year-old female presenting today follow-up approximately 5 months after completing Synvisc injections for both knees due to her activity related knee pain and underlying osteoarthritis  She is unsure how long relief she got from the series, but her knees have been significantly bothering her recently  She has significant difficulty with standing from a seated position, 1st thing in the morning, and with any prolonged standing or walking    The left knee continues to bother her more than the right  She denies any new injuries  She has not been using any ambulatory assistive devices  Review of Systems   Constitutional: Negative  HENT: Negative  Eyes: Negative  Respiratory: Negative  Cardiovascular: Negative  Gastrointestinal: Negative  Endocrine: Negative  Genitourinary: Negative  Musculoskeletal: Positive for arthralgias  Skin: Negative  Allergic/Immunologic: Negative  Neurological: Negative  Hematological: Negative  Psychiatric/Behavioral: Negative            Past Medical History:   Diagnosis Date    Atrial fibrillation ([facility] )     Carrero's esophagus     last assessed: 1/23/2018    Breast cancer ([facility] )     stage 1 (left), given adjuvant radiation with Arimidex x 5 years    Cancer [facility])     Left Breast, Lumpectomy    Cataract     last assessed: 3/11/2014    Dysplasia of toenail     last assessed: 8/29/2017    Esophageal reflux     GERD (gastroesophageal reflux disease)     Gross hematuria     last assessed: 2/19/2015    Hematuria     Hiatal hernia     History of radiation therapy     Hypertension     Irregular heart beat     AFIB    Mixed sensory-motor polyneuropathy     Neuropathy     Obesity     Pacemaker     Paroxysmal atrial fibrillation (HCC)     Peripheral neuropathy     Rectal bleeding     Restless leg syndrome     Shortness of breath     last assessed: 1/11/2016       Past Surgical History:   Procedure Laterality Date    BREAST BIOPSY      BREAST LUMPECTOMY Left     onset: 2006    BREAST SURGERY      CARDIAC PACEMAKER PLACEMENT      x 3 2006    CATARACT EXTRACTION      COLONOSCOPY      CYSTOSCOPY  04/04/2014    diagnostic    HYSTERECTOMY      ALISON BSO; due to fibroid uterus; age 38   [facility]      excision lesion of meniscus or capsule knee    KNEE SURGERY      OOPHORECTOMY Bilateral     OTHER SURGICAL HISTORY      radiation therapy    NH COLONOSCOPY FLX DX W/COLLJ SPEC WHEN PFRMD N/A 2017    Procedure: COLONOSCOPY;  Surgeon: Petty Camacho MD;  [facility]: [facility]; Service: Gastroenterology    GA ESOPHAGOGASTRODUODENOSCOPY TRANSORAL DIAGNOSTIC N/A 2017    Procedure: ESOPHAGOGASTRODUODENOSCOPY (EGD); Surgeon: Donald Rodrigues MD;  Location: [facility];   Service: Gastroenterology    UPPER GASTROINTESTINAL ENDOSCOPY         Family History   Problem Relation Age of Onset    Hypertension Mother     Heart disease Father     Aneurysm Father     Coronary artery disease Father         in his 76s with aneurysm    Aortic aneurysm Father         abdominal    Scleroderma Sister     Breast cancer Sister 76    Hypertension Sister     No Known Problems Son     Testicular cancer Son     Thyroid cancer Son     Colon cancer Maternal Aunt     Colon cancer Maternal Aunt        Social History     Occupational History    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      Comment: retired   Tobacco Use    Smoking status: Former Smoker     Years: 25 00     Last attempt to quit: 1980     Years since quittin 8    Smokeless tobacco: Never Used    Tobacco comment: Quit over 30 years ago; quit age 41   Substance and Sexual Activity    Alcohol use: Not Currently    Drug use: No    Sexual activity: Not on file         Current Outpatient Medications:     Acetaminophen (TYLENOL ARTHRITIS PAIN PO), Take 650 mg by mouth 2 (two) times a day, Disp: , Rfl:     Calcium Acetate, Phos Binder, (CALCIUM ACETATE PO), Take by mouth daily  , Disp: , Rfl:     Cholecalciferol (VITAMIN D3) 5000 units CAPS, Take by mouth daily , Disp: , Rfl:     clobetasol (TEMOVATE) 0 05 % cream, Apply topically once a week  , Disp: , Rfl:     furosemide (LASIX) 40 mg tablet, TAKE 1 TABLET BY MOUTH EVERY DAY, Disp: 90 tablet, Rfl: 0    gabapentin (NEURONTIN) 800 mg tablet, Take 1 tablet (800 mg total) by mouth 4 (four) times a day, Disp: 360 tablet, Rfl: 0    lisinopril (ZESTRIL) 40 mg tablet, Take 1 tablet (40 mg total) by mouth daily As directed, Disp: 90 tablet, Rfl: 3    metoprolol tartrate (LOPRESSOR) 50 mg tablet, TAKE 1 & 1/2 TABLETS BY MOUTH TWICE A DAY, Disp: 270 tablet, Rfl: 2    multivitamin (THERAGRAN) TABS, Take 1 tablet by mouth daily, Disp: , Rfl:     pantoprazole (PROTONIX) 20 mg tablet, Take 1 tablet (20 mg total) by mouth daily, Disp: 30 tablet, Rfl: 0    pramipexole (MIRAPEX) 0 5 mg tablet, One and half tablet at 5:00 p m  and 10:00 p m , Disp: 270 tablet, Rfl: 0    Probiotic Product (PROBIOTIC PO), Take by mouth daily , Disp: , Rfl:     warfarin (COUMADIN) 7 5 mg tablet, Take 7 5 mg by mouth daily, Disp: , Rfl:     Allergies   Allergen Reactions    Cephalexin Rash    Duloxetine Hcl Other (See Comments)     Facial pins and needles sensation    Erythromycin Rash    Levofloxacin Other (See Comments)     Muscular aches    Penicillins Rash    Savella [Milnacipran] Rash    Sulfa Antibiotics Rash       Objective:  Vitals:    08/01/19 1338   BP: 104/68   Pulse: 86       Body mass index is 40 34 kg/m²  Right Knee Exam     Muscle Strength   The patient has normal right knee strength  Tenderness   The patient is experiencing tenderness in the lateral joint line, patella and medial joint line  Range of Motion   Extension:  0 normal   Flexion:  120 normal     Tests   Ned:  Medial - negative Lateral - negative  Varus: negative Valgus: negative  Drawer:  Anterior - negative    Posterior - negative  Patellar apprehension: negative    Other   Erythema: absent  Scars: absent  Sensation: normal  Pulse: present  Swelling: none  Effusion: no effusion present    Comments:  Crepitance on AROM and PROM-parapatellar  - PFG  Grossly distally NVI  Significant difficulty standing from a seated position  Ambulates slowly with an antalgic gait      Left Knee Exam     Muscle Strength   The patient has normal left knee strength      Tenderness   The patient is experiencing tenderness in the lateral joint line, patella and medial joint line  Range of Motion   Extension:  0 normal   Flexion: normal Left knee flexion: 115°     Tests   Ned:  Medial - negative Lateral - negative  Varus: negative Valgus: negative  Drawer:  Anterior - negative     Posterior - negative  Patellar apprehension: negative    Other   Erythema: absent  Scars: absent  Sensation: normal  Pulse: present  Swelling: none  Effusion: no effusion present    Comments:  Crepitance on AROM and PROM-parapatellar  + PFG  Grossly distally NVI            Observations   Left Knee   Negative for effusion  Right Knee   Negative for effusion  Physical Exam   Constitutional: She is oriented to person, place, and time  She appears well-developed and well-nourished  Body mass index is 40 34 kg/m²  HENT:   Head: Normocephalic and atraumatic  Eyes: EOM are normal    Neck: Normal range of motion  Cardiovascular: Intact distal pulses  Pulmonary/Chest: Effort normal    Musculoskeletal:        Right knee: She exhibits no effusion  Left knee: She exhibits no effusion  See ortho exam   Neurological: She is alert and oriented to person, place, and time  Skin: Skin is warm and dry  Psychiatric: She has a normal mood and affect  Her behavior is normal  Judgment and thought content normal    Nursing note and vitals reviewed

## 2019-08-09 ENCOUNTER — APPOINTMENT (OUTPATIENT)
Dept: LAB | Facility: HOSPITAL | Age: 79
End: 2019-08-09
Payer: MEDICARE

## 2019-08-09 ENCOUNTER — OFFICE VISIT (OUTPATIENT)
Dept: INTERNAL MEDICINE CLINIC | Facility: CLINIC | Age: 79
End: 2019-08-09
Payer: MEDICARE

## 2019-08-09 ENCOUNTER — ANTICOAG VISIT (OUTPATIENT)
Dept: CARDIOLOGY CLINIC | Facility: CLINIC | Age: 79
End: 2019-08-09

## 2019-08-09 VITALS
WEIGHT: 230.2 LBS | HEART RATE: 90 BPM | SYSTOLIC BLOOD PRESSURE: 122 MMHG | BODY MASS INDEX: 39.3 KG/M2 | HEIGHT: 64 IN | DIASTOLIC BLOOD PRESSURE: 70 MMHG | OXYGEN SATURATION: 95 % | TEMPERATURE: 97.9 F | RESPIRATION RATE: 18 BRPM

## 2019-08-09 DIAGNOSIS — I48.91 ATRIAL FIBRILLATION, UNSPECIFIED TYPE (HCC): ICD-10-CM

## 2019-08-09 DIAGNOSIS — E66.01 CLASS 3 SEVERE OBESITY DUE TO EXCESS CALORIES WITH SERIOUS COMORBIDITY AND BODY MASS INDEX (BMI) OF 40.0 TO 44.9 IN ADULT (HCC): ICD-10-CM

## 2019-08-09 DIAGNOSIS — R10.9 ABDOMINAL DISCOMFORT: Primary | ICD-10-CM

## 2019-08-09 DIAGNOSIS — I48.20 CHRONIC ATRIAL FIBRILLATION (HCC): ICD-10-CM

## 2019-08-09 LAB
INR PPP: 2.17 (ref 0.86–1.16)
PROTHROMBIN TIME: 21.8 SECONDS (ref 9.4–11.7)

## 2019-08-09 PROCEDURE — 85610 PROTHROMBIN TIME: CPT

## 2019-08-09 PROCEDURE — 36415 COLL VENOUS BLD VENIPUNCTURE: CPT

## 2019-08-09 PROCEDURE — 99213 OFFICE O/P EST LOW 20 MIN: CPT | Performed by: INTERNAL MEDICINE

## 2019-08-09 RX ORDER — LORATADINE 10 MG/1
10 TABLET ORAL DAILY
COMMUNITY

## 2019-08-09 NOTE — PROGRESS NOTES
Assessment/Plan:    Class 3 severe obesity due to excess calories with serious comorbidity and body mass index (BMI) of 40 0 to 44 9 in [facility])  Previously did Weight Watchers, did well  Agrees to see weight management  Diagnoses and all orders for this visit:    Abdominal discomfort  Comments:  May be related to recent foods  Start fiber supplement daily, maintain fluid intake  Discussed liquid to soft diet  Class 3 severe obesity due to excess calories with serious comorbidity and body mass index (BMI) of 40 0 to 44 9 in [facility])  -     Ambulatory referral to Weight Management; Future    Other orders  -     loratadine (CLARITIN) 10 mg tablet; Take 10 mg by mouth daily      Discussed symptoms to warrant ER evaluation  Subjective:      Patient ID: Juanpablo Bingham is a 70 y o  female  Mrs Ortiz complains of lower abdominal pain  Symptoms started about 36 hours ago, after eating a steak sandwich with peppers and onions for dinner  She then developed sharp pains in her lower abdomen, no nausea, vomiting or bloating  Yesterday, pain was not as sharp, describes a mild discomfort  She had no appetite yesterday, does not recall eating anything  She has been drinking a lot of fluids  She thinks she had a small bowel movement yesterday  Today, abdomen feels better, had a small amount of stool, described as soft and yellow  She is worried about appendicitis  She is also concerned about her weight  She weight watchers in the past, has lost some weight but felt lonely when doing it  She was unable to have knee surgery due to weight  The following portions of the patient's history were reviewed and updated as appropriate: current medications, past medical history, past surgical history and problem list     Review of Systems   Constitutional: Positive for appetite change  Negative for fatigue and fever  Cardiovascular: Negative for chest pain     Gastrointestinal: Positive for abdominal pain and diarrhea  Negative for nausea, rectal pain and vomiting  Musculoskeletal: Positive for arthralgias  Neurological: Negative for dizziness  Psychiatric/Behavioral: Negative for dysphoric mood  Objective:      /70   Pulse 90   Temp 97 9 °F (36 6 °C) (Oral)   Resp 18   Ht 5' 4" (1 626 m)   Wt 104 kg (230 lb 3 2 oz)   SpO2 95%   BMI 39 51 kg/m²          Physical Exam   Constitutional: She is oriented to person, place, and time  Vital signs are normal  She appears well-developed  She does not have a sickly appearance  She does not appear ill  HENT:   Head: Normocephalic  Pulmonary/Chest: Effort normal    Abdominal: Bowel sounds are increased  There is no tenderness  There is no rebound and no tenderness at McBurney's point  Neurological: She is alert and oriented to person, place, and time  Skin: Skin is warm  Psychiatric: Her mood appears not anxious  She does not exhibit a depressed mood  Nursing note and vitals reviewed

## 2019-08-09 NOTE — PATIENT INSTRUCTIONS
Start fiber supplement, once or twice a day  Keep hydrated, drink lots of clear fluids  You can try eating toast, clear soup / broth

## 2019-08-15 ENCOUNTER — APPOINTMENT (OUTPATIENT)
Dept: LAB | Facility: CLINIC | Age: 79
End: 2019-08-15
Payer: MEDICARE

## 2019-08-15 ENCOUNTER — ANTICOAG VISIT (OUTPATIENT)
Dept: CARDIOLOGY CLINIC | Facility: CLINIC | Age: 79
End: 2019-08-15

## 2019-08-15 ENCOUNTER — OFFICE VISIT (OUTPATIENT)
Dept: CARDIOLOGY CLINIC | Facility: CLINIC | Age: 79
End: 2019-08-15
Payer: MEDICARE

## 2019-08-15 VITALS
DIASTOLIC BLOOD PRESSURE: 68 MMHG | HEIGHT: 64 IN | HEART RATE: 80 BPM | SYSTOLIC BLOOD PRESSURE: 124 MMHG | OXYGEN SATURATION: 97 % | BODY MASS INDEX: 39.27 KG/M2 | WEIGHT: 230 LBS

## 2019-08-15 DIAGNOSIS — I48.91 ATRIAL FIBRILLATION, UNSPECIFIED TYPE (HCC): ICD-10-CM

## 2019-08-15 DIAGNOSIS — I48.20 CHRONIC ATRIAL FIBRILLATION (HCC): ICD-10-CM

## 2019-08-15 DIAGNOSIS — I48.20 CHRONIC ATRIAL FIBRILLATION (HCC): Primary | ICD-10-CM

## 2019-08-15 LAB
INR PPP: 2.45 (ref 0.84–1.19)
PROTHROMBIN TIME: 25.7 SECONDS (ref 11.6–14.5)

## 2019-08-15 PROCEDURE — 36415 COLL VENOUS BLD VENIPUNCTURE: CPT

## 2019-08-15 PROCEDURE — 99214 OFFICE O/P EST MOD 30 MIN: CPT | Performed by: INTERNAL MEDICINE

## 2019-08-15 PROCEDURE — 93000 ELECTROCARDIOGRAM COMPLETE: CPT | Performed by: INTERNAL MEDICINE

## 2019-08-15 PROCEDURE — 85610 PROTHROMBIN TIME: CPT

## 2019-08-15 NOTE — PROGRESS NOTES
Cardiology Follow Up    [facility]  1940  [id number]  [facility]  [address] [facility]  [address]  [address]  [id number] [phone]    1  Chronic atrial fibrillation (HCC)  POCT ECG       Interval History: Followup for atrial fibrillation    Doing well  She has stable dyspnea  No chest pain  Problem List     Hiatal hernia    Atrial fibrillation ([facility] [address] ) [I48 [id number]]    Overview Signed 3/10/2018  8:59 AM by Álvaro Estes MD     Description: s/p 2 unsuccessful ablation, s/p 2 cardioversion last in 2010, s/p pacemaker  ; on anticoagulation followed by cardiology         Acquired deformity of foot    [place]orns    Diabetic polyneuropathy associated with type 2 diabetes mellitus ([facility] )    Overview Signed 3/12/2018 12:21 PM by Álvaro Estes MD     ?duloxetine         Lab Results   Component Value Date    HGBA1C 5 6 03/05/2019       No results for input(s): POCGLU in the last 72 hours  Blood Sugar Average: Last 72 hrs:          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HCC)    Radiculopathy of lumbar region    Primary osteoarthritis of both knees    RLS (restless legs syndrome)    Arthritis    Essential hypertension    Lichen sclerosus et atrophicus    Multiple lung nodules    Overview Addendum 3/12/2018 12:24 PM by Álvaro Estes MD     Stable, no further CT           Class 3 severe obesity due to excess calories with serious comorbidity and body mass index (BMI) of 40 0 to 44 9 in adult [facility])    Osteoarthritis of left knee    Osteopenia of multiple sites    Peripheral neuropathy    Overview Signed 3/10/2018  8:59 AM by Álvaro Estes MD     Transitioned From: Neuropathy         Vitamin D deficiency    Dense breast tissue on mammogram    Difficulty walking    Flat foot    Onychomycosis    Carrero's esophagus with dysplasia    Chronic pain of left knee    Mild cognitive impairment    Epigastric abdominal pain    Pacemaker    GERD (gastroesophageal reflux disease)    Pedal edema    Type 2 diabetes mellitus with diabetic peripheral angiopathy without gangrene ([place] [address] )    Overview Signed 3/7/2019 10:31 AM by Harsh Em     Per CMS ICD 10 Guidelines         Lab Results   Component Value Date    HGBA1C 5 6 03/05/2019       No results for input(s): POCGLU in the last 72 hours      Blood Sugar Average: Last 72 hrs:          Pain in both feet    Chronic edema    Deep venous insufficiency        Past Medical History:   Diagnosis Date    Atrial fibrillation ([place] [address] )     Carrero's esophagus     last assessed: 1/23/2018    Breast cancer ([place] [address] )     stage 1 (left), given adjuvant radiation with Arimidex x 5 years    Cancer [facility])     Left Breast, Lumpectomy    Cataract     last assessed: 3/11/2014    Dysplasia of toenail     last assessed: 8/29/2017    Esophageal reflux     GERD (gastroesophageal reflux disease)     Gross hematuria     last assessed: 2/19/2015    Hematuria     Hiatal hernia     History of radiation therapy     Hypertension     Irregular heart beat     AFIB    Mixed sensory-motor polyneuropathy     Neuropathy     Obesity     Pacemaker     Paroxysmal atrial fibrillation (HCC)     Peripheral neuropathy     Rectal bleeding     Restless leg syndrome     Shortness of breath     last assessed: 1/11/2016     Social History     Socioeconomic History    Marital status: /Civil Union     Spouse name: Not on file    Number of children: Not on file    Years of education: Not on file    Highest education level: Not on file   Occupational History    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     Comment: retired   Social Needs    Financial resource strain: Not on file    Food insecurity:     Worry: Not on file     Inability: Not on file   [facility] needs:     Medical: Not on file     Non-medical: Not on file   Tobacco Use    Smoking status: Former Smoker     Years: 25 00     Last attempt to quit: 1980     Years since quittin 9    Smokeless tobacco: Never Used    Tobacco comment: Quit over 30 years ago; quit age 41   Substance and Sexual Activity    Alcohol use: Not Currently    Drug use: No    Sexual activity: Not on file   Lifestyle    Physical activity:     Days per week: Not on file     Minutes per session: Not on file    Stress: Not on file   Relationships    Social connections:     Talks on phone: Not on file     Gets together: Not on file     Attends Yarsanism service: Not on file     Active member of club or organization: Not on file     Attends meetings of clubs or organizations: Not on file     Relationship status: Not on file    Intimate partner violence:     Fear of current or ex partner: Not on file     Emotionally abused: Not on file     Physically abused: Not on file     Forced sexual activity: Not on file   Other Topics Concern    Not on file   Social History Narrative          Family History   Problem Relation Age of Onset    Hypertension Mother     Heart disease Father     Aneurysm Father     Coronary artery disease Father         in his 76s with aneurysm    Aortic aneurysm Father         abdominal    Scleroderma Sister     Breast cancer Sister 76    Hypertension Sister     No Known Problems Son     Testicular cancer Son     Thyroid cancer Son     Colon cancer Maternal Aunt     Colon cancer Maternal Aunt     Alcohol abuse Neg Hx     Substance Abuse Neg Hx     Mental illness Neg Hx     Depression Neg Hx      Past Surgical History:   Procedure Laterality Date    BREAST BIOPSY      BREAST LUMPECTOMY Left     onset: 2006    BREAST SURGERY      CARDIAC PACEMAKER PLACEMENT      x 3     CATARACT EXTRACTION      COLONOSCOPY      CYSTOSCOPY  2014    diagnostic    HYSTERECTOMY      ALISON BSO; due to fibroid uterus; age 38   [facility] KNEE CARTILAGE SURGERY      excision lesion of meniscus or capsule knee    KNEE SURGERY      OOPHORECTOMY Bilateral  OTHER SURGICAL HISTORY      radiation therapy    NH COLONOSCOPY FLX DX W/COLLJ SPEC WHEN PFRMD N/A 2/8/2017    Procedure: COLONOSCOPY;  Surgeon: Marcella Bansal MD;  Location: [facility]; Service: Gastroenterology    NH ESOPHAGOGASTRODUODENOSCOPY TRANSORAL DIAGNOSTIC N/A 9/20/2017    Procedure: ESOPHAGOGASTRODUODENOSCOPY (EGD); Surgeon: Isacc Yates MD;  Location: [facility];   Service: Gastroenterology    UPPER GASTROINTESTINAL ENDOSCOPY         Current Outpatient Medications:     Acetaminophen (TYLENOL ARTHRITIS PAIN PO), Take 650 mg by mouth 2 (two) times a day, Disp: , Rfl:     Calcium Acetate, Phos Binder, (CALCIUM ACETATE PO), Take by mouth daily  , Disp: , Rfl:     Cholecalciferol (VITAMIN D3) 5000 units CAPS, Take by mouth daily , Disp: , Rfl:     clobetasol (TEMOVATE) 0 05 % cream, Apply topically once a week  , Disp: , Rfl:     furosemide (LASIX) 40 mg tablet, TAKE 1 TABLET BY MOUTH EVERY DAY, Disp: 90 tablet, Rfl: 0    gabapentin (NEURONTIN) 800 mg tablet, Take 1 tablet (800 mg total) by mouth 4 (four) times a day, Disp: 360 tablet, Rfl: 0    lisinopril (ZESTRIL) 40 mg tablet, Take 1 tablet (40 mg total) by mouth daily As directed, Disp: 90 tablet, Rfl: 3    loratadine (CLARITIN) 10 mg tablet, Take 10 mg by mouth daily, Disp: , Rfl:     metoprolol tartrate (LOPRESSOR) 50 mg tablet, TAKE 1 & 1/2 TABLETS BY MOUTH TWICE A DAY, Disp: 270 tablet, Rfl: 2    multivitamin (THERAGRAN) TABS, Take 1 tablet by mouth daily, Disp: , Rfl:     pantoprazole (PROTONIX) 20 mg tablet, Take 1 tablet (20 mg total) by mouth daily, Disp: 30 tablet, Rfl: 0    pramipexole (MIRAPEX) 0 5 mg tablet, One and half tablet at 5:00 p m  and 10:00 p m , Disp: 270 tablet, Rfl: 0    Probiotic Product (PROBIOTIC PO), Take by mouth daily , Disp: , Rfl:     warfarin (COUMADIN) 7 5 mg tablet, Take 7 5 mg by mouth daily, Disp: , Rfl:   Allergies   Allergen Reactions    Cephalexin Rash    Duloxetine Hcl Other (See Comments) Facial pins and needles sensation    Erythromycin Rash    Levofloxacin Other (See Comments)     Muscular aches    Penicillins Rash    Savella [Milnacipran] Rash    Sulfa Antibiotics Rash       Labs:     Chemistry        Component Value Date/Time     11/09/2015 1115    K 4 4 06/06/2019 0854    K 4 5 11/09/2015 1115     06/06/2019 0854     11/09/2015 1115    CO2 28 06/06/2019 0854    CO2 30 (H) 11/09/2015 1115    BUN 25 06/06/2019 0854    BUN 20 11/09/2015 1115    CREATININE 0 94 06/06/2019 0854    CREATININE 0 8 11/09/2015 1115        Component Value Date/Time    CALCIUM 9 3 06/06/2019 0854    CALCIUM 9 0 11/09/2015 1115    ALKPHOS 64 06/06/2019 0854    ALKPHOS 61 11/09/2015 1115    AST 20 06/06/2019 0854    AST 23 11/09/2015 1115    ALT 26 06/06/2019 0854    ALT 31 11/09/2015 1115    BILITOT 0 5 11/09/2015 1115            Lab Results   Component Value Date    CHOL 162 11/09/2015     Lab Results   Component Value Date    HDL 41 03/05/2019    HDL 49 11/28/2018    HDL 40 03/06/2018     Lab Results   Component Value Date    LDLCALC 105 (H) 03/05/2019    LDLCALC 124 (H) 11/28/2018    LDLCALC 88 03/06/2018     Lab Results   Component Value Date    TRIG 119 03/05/2019    TRIG 91 11/28/2018    TRIG 110 03/06/2018     No results found for: CHOLHDL    Imaging: No results found  EKG: Atrial Fibrillation  Review of Systems   Constitution: Negative  HENT: Negative  Eyes: Negative  Cardiovascular: Positive for dyspnea on exertion  Respiratory: Positive for shortness of breath  Endocrine: Negative  Hematologic/Lymphatic: Negative  Skin: Negative  Musculoskeletal: Positive for arthritis  Gastrointestinal: Negative  Genitourinary: Negative  Neurological: Negative  Psychiatric/Behavioral: Negative  Allergic/Immunologic: Negative          Vitals:    08/15/19 1324   BP: 124/68   Pulse: 80   SpO2: 97%           Physical Exam   Constitutional: She is oriented to person, place, and time  No distress  HENT:   Mouth/Throat: No oropharyngeal exudate  Eyes: No scleral icterus  Neck: No JVD present  Cardiovascular: Normal rate  An irregularly irregular rhythm present  Murmur heard  Pulmonary/Chest: Effort normal and breath sounds normal  No stridor  No respiratory distress  She has no wheezes  Abdominal: Soft  Bowel sounds are normal  She exhibits no distension  There is no tenderness  There is no guarding  Musculoskeletal: She exhibits no edema  Neurological: She is alert and oriented to person, place, and time  Skin: Skin is warm and dry  She is not diaphoretic  Psychiatric: She has a normal mood and affect  Her behavior is normal        Discussion/Summary:    1  Chronic Atrial Fibrillation: Overall doing well  Continue Metoprolol  Stable on Coumadin  Overall doing well       2  Hypertension: Her BP is well controlled  Continue current medical therapy        3  s/p PPM: Continue device checks        4  Mild Aortic Stenosis: Will continue to follow  The patient was counseled regarding diagnostic results, instructions for management, risk factor reductions, impressions  total time of encounter was 25 minutes and 15 minutes was spent counseling

## 2019-08-27 ENCOUNTER — HOSPITAL ENCOUNTER (OUTPATIENT)
Dept: NON INVASIVE DIAGNOSTICS | Facility: CLINIC | Age: 79
Discharge: HOME | End: 2019-08-27
Attending: PODIATRIST
Payer: MEDICARE

## 2019-08-27 DIAGNOSIS — R60.9 CHRONIC EDEMA: ICD-10-CM

## 2019-08-27 PROCEDURE — 93970 EXTREMITY STUDY: CPT

## 2019-08-27 PROCEDURE — 93970 EXTREMITY STUDY: CPT | Performed by: SURGERY

## 2019-08-28 ENCOUNTER — TELEPHONE (OUTPATIENT)
Dept: PODIATRY | Facility: CLINIC | Age: 79
End: 2019-08-28

## 2019-08-28 NOTE — TELEPHONE ENCOUNTER
Patient has chronic edema  She has mild reflux of the vein architecture  She will try compression stockings

## 2019-09-08 DIAGNOSIS — I48.91 ATRIAL FIBRILLATION, UNSPECIFIED TYPE (HCC): ICD-10-CM

## 2019-09-09 DIAGNOSIS — G25.81 RLS (RESTLESS LEGS SYNDROME): ICD-10-CM

## 2019-09-09 RX ORDER — FUROSEMIDE 40 MG/1
TABLET ORAL
Qty: 90 TABLET | Refills: 0 | Status: SHIPPED | OUTPATIENT
Start: 2019-09-09 | End: 2019-12-04 | Stop reason: SDUPTHER

## 2019-09-11 ENCOUNTER — APPOINTMENT (OUTPATIENT)
Dept: LAB | Facility: HOSPITAL | Age: 79
End: 2019-09-11
Payer: MEDICARE

## 2019-09-11 ENCOUNTER — ANTICOAG VISIT (OUTPATIENT)
Dept: CARDIOLOGY CLINIC | Facility: CLINIC | Age: 79
End: 2019-09-11

## 2019-09-11 ENCOUNTER — TRANSCRIBE ORDERS (OUTPATIENT)
Dept: ADMINISTRATIVE | Facility: HOSPITAL | Age: 79
End: 2019-09-11

## 2019-09-11 DIAGNOSIS — I48.20 CHRONIC ATRIAL FIBRILLATION (HCC): ICD-10-CM

## 2019-09-12 ENCOUNTER — OFFICE VISIT (OUTPATIENT)
Dept: INTERNAL MEDICINE CLINIC | Facility: CLINIC | Age: 79
End: 2019-09-12
Payer: MEDICARE

## 2019-09-12 ENCOUNTER — OFFICE VISIT (OUTPATIENT)
Dept: OBGYN CLINIC | Facility: CLINIC | Age: 79
End: 2019-09-12
Payer: MEDICARE

## 2019-09-12 ENCOUNTER — HOSPITAL ENCOUNTER (OUTPATIENT)
Dept: RADIOLOGY | Facility: HOSPITAL | Age: 79
Discharge: HOME | End: 2019-09-12
Payer: MEDICARE

## 2019-09-12 VITALS
WEIGHT: 232.4 LBS | HEART RATE: 80 BPM | HEIGHT: 64 IN | SYSTOLIC BLOOD PRESSURE: 87 MMHG | BODY MASS INDEX: 39.67 KG/M2 | DIASTOLIC BLOOD PRESSURE: 58 MMHG

## 2019-09-12 VITALS
TEMPERATURE: 98.1 F | WEIGHT: 230.6 LBS | RESPIRATION RATE: 18 BRPM | BODY MASS INDEX: 39.37 KG/M2 | SYSTOLIC BLOOD PRESSURE: 112 MMHG | OXYGEN SATURATION: 98 % | HEIGHT: 64 IN | DIASTOLIC BLOOD PRESSURE: 80 MMHG | HEART RATE: 92 BPM

## 2019-09-12 DIAGNOSIS — E11.51 TYPE 2 DIABETES MELLITUS WITH DIABETIC PERIPHERAL ANGIOPATHY WITHOUT GANGRENE, WITHOUT LONG-TERM CURRENT USE OF INSULIN (HCC): ICD-10-CM

## 2019-09-12 DIAGNOSIS — G89.29 CHRONIC PAIN OF BOTH KNEES: ICD-10-CM

## 2019-09-12 DIAGNOSIS — G89.29 CHRONIC BILATERAL LOW BACK PAIN WITHOUT SCIATICA: ICD-10-CM

## 2019-09-12 DIAGNOSIS — R10.32 LEFT INGUINAL PAIN: ICD-10-CM

## 2019-09-12 DIAGNOSIS — M17.0 BILATERAL PRIMARY OSTEOARTHRITIS OF KNEE: Primary | ICD-10-CM

## 2019-09-12 DIAGNOSIS — I87.2 DEEP VENOUS INSUFFICIENCY: ICD-10-CM

## 2019-09-12 DIAGNOSIS — M54.16 RADICULOPATHY OF LUMBAR REGION: ICD-10-CM

## 2019-09-12 DIAGNOSIS — M25.562 CHRONIC PAIN OF BOTH KNEES: ICD-10-CM

## 2019-09-12 DIAGNOSIS — M17.0 PRIMARY OSTEOARTHRITIS OF BOTH KNEES: Primary | ICD-10-CM

## 2019-09-12 DIAGNOSIS — M25.561 CHRONIC PAIN OF BOTH KNEES: ICD-10-CM

## 2019-09-12 DIAGNOSIS — M54.50 CHRONIC BILATERAL LOW BACK PAIN WITHOUT SCIATICA: ICD-10-CM

## 2019-09-12 LAB — SL AMB POCT HEMOGLOBIN AIC: 5.6 (ref ?–6.5)

## 2019-09-12 PROCEDURE — 83036 HEMOGLOBIN GLYCOSYLATED A1C: CPT | Performed by: INTERNAL MEDICINE

## 2019-09-12 PROCEDURE — 99213 OFFICE O/P EST LOW 20 MIN: CPT | Performed by: INTERNAL MEDICINE

## 2019-09-12 PROCEDURE — 73502 X-RAY EXAM HIP UNI 2-3 VIEWS: CPT

## 2019-09-12 PROCEDURE — 72110 X-RAY EXAM L-2 SPINE 4/>VWS: CPT

## 2019-09-12 PROCEDURE — 20610 DRAIN/INJ JOINT/BURSA W/O US: CPT | Performed by: PHYSICIAN ASSISTANT

## 2019-09-12 RX ORDER — TOBRAMYCIN AND DEXAMETHASONE 3; 1 MG/ML; MG/ML
SUSPENSION/ DROPS OPHTHALMIC
Refills: 1 | COMMUNITY
Start: 2019-08-23 | End: 2019-12-02 | Stop reason: ALTCHOICE

## 2019-09-12 NOTE — PROGRESS NOTES
Assessment/Plan:    Primary osteoarthritis of both knees  Received injections earlier today  Suspect leg symptoms mostly due to knee pain  May continue using hemp extract, Tylenol prn  Radiculopathy of lumbar region  Intermittent low back pain and groin pain  Suspect OA, check x-rays  Deep venous insufficiency  Unable to tolerate compression stockings, recommend to use regular stockings instead  Type 2 diabetes mellitus with diabetic peripheral angiopathy without gangrene [facility])  Lab Results   Component Value Date    HGBA1C 5 6 09/12/2019     Not on medication  Diagnoses and all orders for this visit:    Primary osteoarthritis of both knees    Radiculopathy of lumbar region  -     XR spine lumbar minimum 4 views non injury; Future    Chronic bilateral low back pain without sciatica  -     XR spine lumbar minimum 4 views non injury; Future    Left inguinal pain  -     XR hip/pelv 2-3 vws left if performed; Future    Deep venous insufficiency    Type 2 diabetes mellitus with diabetic peripheral angiopathy without gangrene, without long-term current use of insulin (HCC)  -     POCT hemoglobin A1c    Other orders  -     Cancel: POCT hemoglobin A1c      Follow up as scheduled or as needed  Subjective:      Patient ID: Carlos Metz is a 70 y o  female  Mrs Ortiz complains of bilateral leg pain  She reports pain ongoing for months  She complains of knee pain, worse after prolonged sitting or when she wakes up in the morning  She is unable to walk too far due to pain  She started taking Hemp extract capsules, noticed increase pain when she stopped taking it for about 2 weeks  She is not taking the oil, feels it has helped with her pain  She has been taking 2 Tylenol 650 mg almost every morning  She is asking if magnesium would help  She was seen at Ortho earlier today, received injections in both knees  She does not want to do surgery      She also complains of intermittent left groin pain, nonradiating  She has occasional low back pain, mostly on the left side  She denies any recent falls  She tried using the compression stockings; however it was too tight for her  She tried using it various ways but stopped since the bad was causing more discomfort  Reports leg swelling is intermittent  The following portions of the patient's history were reviewed and updated as appropriate: allergies, current medications, past medical history, past social history and problem list     Review of Systems   Constitutional: Positive for activity change  Respiratory: Negative for shortness of breath  Cardiovascular: Negative for leg swelling  Musculoskeletal: Positive for arthralgias and back pain  Negative for joint swelling  Psychiatric/Behavioral: Positive for sleep disturbance  Objective:      /80   Pulse 92   Temp 98 1 °F (36 7 °C) (Oral)   Resp 18   Ht 5' 4" (1 626 m)   Wt 105 kg (230 lb 9 6 oz)   SpO2 98%   BMI 39 58 kg/m²          Physical Exam   Constitutional: She appears well-developed and well-nourished  HENT:   Head: Normocephalic  Eyes: Pupils are equal, round, and reactive to light  Pulmonary/Chest: Effort normal    Musculoskeletal: She exhibits no edema  Right knee: Tenderness found  Left knee: Tenderness found  Lumbar back: She exhibits spasm  She exhibits no tenderness and no pain  Back:         Legs:  Antalgic gait   Skin: Skin is warm

## 2019-09-12 NOTE — PROGRESS NOTES
Assessment/Plan:  1  Bilateral primary osteoarthritis of knee  Large joint arthrocentesis   2  Chronic pain of both knees  Large joint arthrocentesis     Zeeshan Blunt is a pleasant 61-year-old female well known to our practice for her activity related bilateral knee pain due to her severe underlying osteoarthritis  Despite her recent success with CBD oral, she continues to have aching and pain on a daily basis in both knees  She has seen benefit from previous Synvisc series  She consented to and underwent bilateral knee Synvisc injections today without difficulty or complication  Post injection instructions were provided  We will plan to see her back next week and the week after to complete the series  Of note, she is ambulating with a much more steady and symmetrical gait today than she was 6 weeks ago  All of her questions were addressed today  Large joint arthrocentesis: bilateral knee  Date/Time: 9/12/2019 1:28 PM  Consent given by: patient  Site marked: site marked  Timeout: Immediately prior to procedure a time out was called to verify the correct patient, procedure, equipment, support staff and site/side marked as required   Supporting Documentation  Indications: pain   Procedure Details  Location: knee - bilateral knee  Preparation: Patient was prepped and draped in the usual sterile fashion  Needle size: 20 G  Ultrasound guidance: no  Approach: anterolateral    Medications (Right): 16 mg hylan 16 MG/2MLMedications (Left): 16 mg hylan 16 MG/2ML   Patient tolerance: patient tolerated the procedure well with no immediate complications  Dressing:  Sterile dressing applied          Subjective: Bilateral knee Synvisc #1    Patient ID: Nelson Goodell is a 70 y o  female  Zeeshan Blunt is a very pleasant 61-year-old female presenting today for follow-up of her activity related bilateral knee pain severe underlying osteoarthritis    She is here today to reinitiate the Synvisc series, she saw significant benefit from the series 6 months ago  She reports that she has been taking CBD oil and did see significant relief of her soreness in her legs and sharp pain in her knees as a result  She does continue to have achiness and soreness in both knees on a daily basis  She denies any new injuries or mechanical symptoms  She does feel that her gait mechanics have improved  Review of Systems   Constitutional: Negative  HENT: Negative  Eyes: Negative  Stye   Respiratory: Negative  Cardiovascular: Negative  Gastrointestinal: Negative  Endocrine: Negative  Genitourinary: Negative  Musculoskeletal: Positive for arthralgias  Skin: Negative  Allergic/Immunologic: Negative  Neurological: Negative  Hematological: Negative  Psychiatric/Behavioral: Negative            Past Medical History:   Diagnosis Date    Atrial fibrillation ([facility] )     Carrero's esophagus     last assessed: 1/23/2018    Breast cancer ([facility] )     stage 1 (left), given adjuvant radiation with Arimidex x 5 years    Cancer [facility])     Left Breast, Lumpectomy    Cataract     last assessed: 3/11/2014    Dysplasia of toenail     last assessed: 8/29/2017    Esophageal reflux     GERD (gastroesophageal reflux disease)     Gross hematuria     last assessed: 2/19/2015    Hematuria     Hiatal hernia     History of radiation therapy     Hypertension     Irregular heart beat     AFIB    Mixed sensory-motor polyneuropathy     Neuropathy     Obesity     Pacemaker     Paroxysmal atrial fibrillation (HCC)     Peripheral neuropathy     Rectal bleeding     Restless leg syndrome     Shortness of breath     last assessed: 1/11/2016       Past Surgical History:   Procedure Laterality Date    BREAST BIOPSY      BREAST LUMPECTOMY Left     onset: 2006    BREAST SURGERY      CARDIAC PACEMAKER PLACEMENT      x 3 2006    CATARACT EXTRACTION      COLONOSCOPY      CYSTOSCOPY  04/04/2014    diagnostic    HYSTERECTOMY ALISON BSO; due to fibroid uterus; age 38   Learta January KNEE CARTILAGE SURGERY      excision lesion of meniscus or capsule knee    KNEE SURGERY      OOPHORECTOMY Bilateral     OTHER SURGICAL HISTORY      radiation therapy    [place] COLONOSCOPY FLX DX W/COLLJ SPEC WHEN PFRMD N/A 2017    Procedure: COLONOSCOPY;  Surgeon: Sheron Padron MD;  [facility]; Service: Gastroenterology    NH ESOPHAGOGASTRODUODENOSCOPY TRANSORAL DIAGNOSTIC N/A 2017    Procedure: ESOPHAGOGASTRODUODENOSCOPY (EGD); Surgeon: Guerline Clark MD;  [facility];   Service: Gastroenterology    UPPER GASTROINTESTINAL ENDOSCOPY         Family History   Problem Relation Age of Onset    Hypertension Mother     Heart disease Father     Aneurysm Father     Coronary artery disease Father         in his 76s with aneurysm    Aortic aneurysm Father         abdominal    Scleroderma Sister     Breast cancer Sister 76    Hypertension Sister     No Known Problems Son     Testicular cancer Son     Thyroid cancer Son     Colon cancer Maternal Aunt     Colon cancer Maternal Aunt     Alcohol abuse Neg Hx     Substance Abuse Neg Hx     Mental illness Neg Hx     Depression Neg Hx        Social History     Occupational History    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      Comment: retired   Tobacco Use    Smoking status: Former Smoker     Years: 25 00     Last attempt to quit: 1980     Years since quittin 0    Smokeless tobacco: Never Used    Tobacco comment: Quit over 30 years ago; quit age 41   Substance and Sexual Activity    Alcohol use: Not Currently    Drug use: No    Sexual activity: Not on file         Current Outpatient Medications:     Acetaminophen (TYLENOL ARTHRITIS PAIN PO), Take 650 mg by mouth 2 (two) times a day, Disp: , Rfl:     Calcium Acetate, Phos Binder, (CALCIUM ACETATE PO), Take by mouth daily  , Disp: , Rfl:     Cholecalciferol (VITAMIN D3) 5000 units CAPS, Take by mouth daily , Disp: , Rfl:     clobetasol (TEMOVATE) 0 05 % cream, Apply topically once a week  , Disp: , Rfl:     furosemide (LASIX) 40 mg tablet, TAKE 1 TABLET BY MOUTH EVERY DAY, Disp: 90 tablet, Rfl: 0    gabapentin (NEURONTIN) 800 mg tablet, Take 1 tablet (800 mg total) by mouth 4 (four) times a day, Disp: 360 tablet, Rfl: 0    lisinopril (ZESTRIL) 40 mg tablet, Take 1 tablet (40 mg total) by mouth daily As directed, Disp: 90 tablet, Rfl: 3    loratadine (CLARITIN) 10 mg tablet, Take 10 mg by mouth daily, Disp: , Rfl:     metoprolol tartrate (LOPRESSOR) 50 mg tablet, TAKE 1 & 1/2 TABLETS BY MOUTH TWICE A DAY, Disp: 270 tablet, Rfl: 2    multivitamin (THERAGRAN) TABS, Take 1 tablet by mouth daily, Disp: , Rfl:     pantoprazole (PROTONIX) 20 mg tablet, Take 1 tablet (20 mg total) by mouth daily, Disp: 30 tablet, Rfl: 0    pramipexole (MIRAPEX) 0 5 mg tablet, One and half tablet at 5:00 p m  and 10:00 p m , Disp: 270 tablet, Rfl: 0    Probiotic Product (PROBIOTIC PO), Take by mouth daily , Disp: , Rfl:     tobramycin-dexamethasone (TOBRADEX) ophthalmic suspension, INSTILL 1 DROP INTO LEFT EYE 4 TIMES A DAY AS DIRECTED, Disp: , Rfl: 1    warfarin (COUMADIN) 7 5 mg tablet, Take 7 5 mg by mouth daily, Disp: , Rfl:     Allergies   Allergen Reactions    Cephalexin Rash    Duloxetine Hcl Other (See Comments)     Facial pins and needles sensation    Erythromycin Rash    Levofloxacin Other (See Comments)     Muscular aches    Penicillins Rash    Savella [Milnacipran] Rash    Sulfa Antibiotics Rash       Objective:  Vitals:    09/12/19 1300   BP: (!) 87/58   Pulse: 80       Body mass index is 39 89 kg/m²  Right Knee Exam     Muscle Strength   The patient has normal right knee strength  Tenderness   The patient is experiencing tenderness in the lateral joint line, patella and medial joint line      Range of Motion   Extension:  0 normal   Flexion:  120 normal     Tests   Ned:  Medial - negative Lateral - negative  Varus: negative Valgus: negative  Drawer:  Anterior - negative    Posterior - negative  Patellar apprehension: negative    Other   Erythema: absent  Scars: absent  Sensation: normal  Pulse: present  Swelling: none  Effusion: no effusion present    Comments:  Crepitance on AROM and PROM-parapatellar  - PFG  Grossly distally NVI  Significant difficulty standing from a seated position  Ambulates slowly with a symmetrical gait, drastically improved from visit 6 weeks ago      Left Knee Exam     Muscle Strength   The patient has normal left knee strength  Tenderness   The patient is experiencing tenderness in the lateral joint line, patella and medial joint line  Range of Motion   Extension:  0 normal   Flexion: normal Left knee flexion: 115°     Tests   Ned:  Medial - negative Lateral - negative  Varus: negative Valgus: negative  Drawer:  Anterior - negative     Posterior - negative  Patellar apprehension: negative    Other   Erythema: absent  Scars: absent  Sensation: normal  Pulse: present  Swelling: none  Effusion: no effusion present    Comments:  Crepitance on AROM and PROM-parapatellar  + PFG  Grossly distally NVI            Observations   Left Knee   Negative for effusion  Right Knee   Negative for effusion  Physical Exam   Constitutional: She is oriented to person, place, and time  She appears well-developed and well-nourished  Body mass index is 39 89 kg/m²  HENT:   Head: Normocephalic and atraumatic  Eyes: EOM are normal    Neck: Normal range of motion  Cardiovascular: Intact distal pulses  Pulmonary/Chest: Effort normal    Musculoskeletal:        Right knee: She exhibits no effusion  Left knee: She exhibits no effusion  See ortho exam   Neurological: She is alert and oriented to person, place, and time  Skin: Skin is warm and dry  Psychiatric: She has a normal mood and affect   Her behavior is normal  Judgment and thought content normal    Nursing note and vitals reviewed

## 2019-09-12 NOTE — PATIENT INSTRUCTIONS
You can use lidocaine ointment as needed  You may take acetaminophen  (Tylenol) 500 mg, 1 to 2 tablets 3 times a day, as needed for pain  No more than 6 tablets a day

## 2019-09-12 NOTE — ASSESSMENT & PLAN NOTE
Received injections earlier today  Suspect leg symptoms mostly due to knee pain  May continue using hemp extract, Tylenol prn

## 2019-09-19 ENCOUNTER — OFFICE VISIT (OUTPATIENT)
Dept: OBGYN CLINIC | Facility: CLINIC | Age: 79
End: 2019-09-19
Payer: MEDICARE

## 2019-09-19 ENCOUNTER — TELEPHONE (OUTPATIENT)
Dept: INTERNAL MEDICINE CLINIC | Facility: CLINIC | Age: 79
End: 2019-09-19

## 2019-09-19 DIAGNOSIS — M17.0 PRIMARY OSTEOARTHRITIS OF KNEES, BILATERAL: Primary | ICD-10-CM

## 2019-09-19 PROCEDURE — 20610 DRAIN/INJ JOINT/BURSA W/O US: CPT | Performed by: ORTHOPAEDIC SURGERY

## 2019-09-19 NOTE — TELEPHONE ENCOUNTER
X-ray showed arthritis in the low back area, no arthritis in the hip  Hip pain may be due to back  We can try physical therapy or I can refer you to pain and spine  Let me know

## 2019-09-19 NOTE — PROGRESS NOTES
Assessment/Plan:  1  Primary osteoarthritis of knees, bilateral       Patient is here for bilateral knee Synvisc injection #2  She has been tolerating the series of injections well  She tolerated today's injection well  I will see her back next week for her last injection series for bilateral knees  Large joint arthrocentesis: bilateral knee  Date/Time: 9/19/2019 1:42 PM  Consent given by: patient  Site marked: site marked  Timeout: Immediately prior to procedure a time out was called to verify the correct patient, procedure, equipment, support staff and site/side marked as required   Supporting Documentation  Indications: pain   Procedure Details  Location: knee - bilateral knee  Preparation: Patient was prepped and draped in the usual sterile fashion  Needle size: 20 G  Ultrasound guidance: no  Approach: anterolateral    Medications (Right): 16 mg hylan 16 MG/2MLMedications (Left): 16 mg hylan 16 MG/2ML   Patient tolerance: patient tolerated the procedure well with no immediate complications  Dressing:  Sterile dressing applied        Subjective:  Bilateral knee Synvisc injection #2    Patient ID: Americo Minaya is a 70 y o  female  HPI  Patient is here for 2nd Synvisc injection in her bilateral knee  Review of Systems   Constitutional: Positive for activity change  HENT: Negative  Eyes: Negative  Respiratory: Negative  Cardiovascular: Negative  Gastrointestinal: Negative  Endocrine: Negative  Musculoskeletal: Positive for arthralgias  Skin: Negative  Neurological: Negative  Psychiatric/Behavioral: Negative            Past Medical History:   Diagnosis Date    [facility] fibrillation [facility] )     Carrero's esophagus     last assessed: 1/23/2018    Breast cancer ([facility] )     stage 1 (left), given adjuvant radiation with Arimidex x 5 years    Cancer [facility])     Left Breast, Lumpectomy    Cataract     last assessed: 3/11/2014    Dysplasia of toenail     last assessed: 8/29/2017   Angela Zhang Esophageal reflux     GERD (gastroesophageal reflux disease)     Gross hematuria     last assessed: 2/19/2015    Hematuria     Hiatal hernia     History of radiation therapy     Hypertension     Irregular heart beat     AFIB    Mixed sensory-motor polyneuropathy     Neuropathy     Obesity     Pacemaker     Paroxysmal atrial fibrillation (HCC)     Peripheral neuropathy     Rectal bleeding     Restless leg syndrome     Shortness of breath     last assessed: 1/11/2016       Past Surgical History:   Procedure Laterality Date    BREAST BIOPSY      BREAST LUMPECTOMY Left     onset: 2006    BREAST SURGERY      CARDIAC PACEMAKER PLACEMENT      x 3 2006    CATARACT EXTRACTION      COLONOSCOPY      CYSTOSCOPY  04/04/2014    diagnostic    HYSTERECTOMY      ALISON BSO; due to fibroid uterus; age 38   Deadra Jeff KNEE CARTILAGE SURGERY      excision lesion of meniscus or capsule knee    KNEE SURGERY      OOPHORECTOMY Bilateral     OTHER SURGICAL HISTORY      radiation therapy    [place] COLONOSCOPY FLX DX W/COLLJ SPEC WHEN PFRMD N/A 2/8/2017    Procedure: COLONOSCOPY;  Surgeon: Edward Paz MD;  [facility]; Service: Gastroenterology    MO ESOPHAGOGASTRODUODENOSCOPY TRANSORAL DIAGNOSTIC N/A 9/20/2017    Procedure: ESOPHAGOGASTRODUODENOSCOPY (EGD); Surgeon: Keith Bermudez MD;  [facility];   Service: Gastroenterology    UPPER GASTROINTESTINAL ENDOSCOPY         Family History   Problem Relation Age of Onset    Hypertension Mother     Heart disease Father     Aneurysm Father     Coronary artery disease Father         in his 76s with aneurysm    Aortic aneurysm Father         abdominal    Scleroderma Sister     Breast cancer Sister 76    Hypertension Sister     No Known Problems Son     Testicular cancer Son     Thyroid cancer Son     Colon cancer Maternal Aunt     Colon cancer Maternal Aunt     Alcohol abuse Neg Hx     Substance Abuse Neg Hx     Mental illness Neg Hx     Depression Neg Hx Social History     Occupational History    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      Comment: retired   Tobacco Use    Smoking status: Former Smoker     Years: 25 00     Types: Cigarettes     Last attempt to quit: 1980     Years since quittin 0    Smokeless tobacco: Never Used    Tobacco comment: Quit over 30 years ago; quit age 41   Substance and Sexual Activity    Alcohol use: Not Currently    Drug use: No    Sexual activity: Not on file         Current Outpatient Medications:     Acetaminophen (TYLENOL ARTHRITIS PAIN PO), Take 650 mg by mouth 2 (two) times a day, Disp: , Rfl:     Calcium Acetate, Phos Binder, (CALCIUM ACETATE PO), Take by mouth daily  , Disp: , Rfl:     Cholecalciferol (VITAMIN D3) 5000 units CAPS, Take by mouth daily , Disp: , Rfl:     clobetasol (TEMOVATE) 0 05 % cream, Apply topically once a week  , Disp: , Rfl:     furosemide (LASIX) 40 mg tablet, TAKE 1 TABLET BY MOUTH EVERY DAY, Disp: 90 tablet, Rfl: 0    gabapentin (NEURONTIN) 800 mg tablet, Take 1 tablet (800 mg total) by mouth 4 (four) times a day, Disp: 360 tablet, Rfl: 0    lisinopril (ZESTRIL) 40 mg tablet, Take 1 tablet (40 mg total) by mouth daily As directed, Disp: 90 tablet, Rfl: 3    loratadine (CLARITIN) 10 mg tablet, Take 10 mg by mouth daily, Disp: , Rfl:     metoprolol tartrate (LOPRESSOR) 50 mg tablet, TAKE 1 & 1/2 TABLETS BY MOUTH TWICE A DAY, Disp: 270 tablet, Rfl: 2    multivitamin (THERAGRAN) TABS, Take 1 tablet by mouth daily, Disp: , Rfl:     pantoprazole (PROTONIX) 20 mg tablet, Take 1 tablet (20 mg total) by mouth daily, Disp: 30 tablet, Rfl: 0    pramipexole (MIRAPEX) 0 5 mg tablet, One and half tablet at 5:00 p m  and 10:00 p m , Disp: 270 tablet, Rfl: 0    Probiotic Product (PROBIOTIC PO), Take by mouth daily , Disp: , Rfl:     tobramycin-dexamethasone (TOBRADEX) ophthalmic suspension, INSTILL 1 DROP INTO LEFT EYE 4 TIMES A DAY AS DIRECTED, Disp: , Rfl: 1   warfarin (COUMADIN) 7 5 mg tablet, Take 7 5 mg by mouth daily, Disp: , Rfl:     Allergies   Allergen Reactions    Cephalexin Rash    Duloxetine Hcl Other (See Comments)     Facial pins and needles sensation    Erythromycin Rash    Levofloxacin Other (See Comments)     Muscular aches    Penicillins Rash    Savella [Milnacipran] Rash    Sulfa Antibiotics Rash       Objective: There were no vitals filed for this visit  There is no height or weight on file to calculate BMI  Right Knee Exam     Muscle Strength   The patient has normal right knee strength  Tenderness   The patient is experiencing tenderness in the lateral joint line, patella and medial joint line  Range of Motion   Extension:  0 normal   Flexion:  120 normal     Tests   Ned:  Medial - negative Lateral - negative  Varus: negative Valgus: negative  Drawer:  Anterior - negative    Posterior - negative  Patellar apprehension: negative    Other   Erythema: absent  Scars: absent  Sensation: normal  Pulse: present  Swelling: none  Effusion: no effusion present    Comments:  Crepitance on AROM and PROM-parapatellar  - PFG  Grossly distally NVI  Significant difficulty standing from a seated position  Ambulates slowly with a symmetrical gait, drastically improved from visit 6 weeks ago      Left Knee Exam     Muscle Strength   The patient has normal left knee strength  Tenderness   The patient is experiencing tenderness in the lateral joint line, patella and medial joint line      Range of Motion   Extension:  0 normal   Flexion: normal Left knee flexion: 115°     Tests   Ned:  Medial - negative Lateral - negative  Varus: negative Valgus: negative  Drawer:  Anterior - negative     Posterior - negative  Patellar apprehension: negative    Other   Erythema: absent  Scars: absent  Sensation: normal  Pulse: present  Swelling: none  Effusion: no effusion present    Comments:  Crepitance on AROM and PROM-parapatellar  + PFG  Grossly distally NVI            Observations   Left Knee   Negative for effusion  Right Knee   Negative for effusion  Physical Exam   Musculoskeletal:        Right knee: She exhibits no effusion  Left knee: She exhibits no effusion  Nursing note and vitals reviewed  Ophelia MELENDEZ    Division of Adult Reconstruction  Department of Orthopaedic Surgery  [facility]

## 2019-09-26 ENCOUNTER — OFFICE VISIT (OUTPATIENT)
Dept: OBGYN CLINIC | Facility: CLINIC | Age: 79
End: 2019-09-26
Payer: MEDICARE

## 2019-09-26 DIAGNOSIS — M25.562 CHRONIC PAIN OF BOTH KNEES: ICD-10-CM

## 2019-09-26 DIAGNOSIS — M17.0 BILATERAL PRIMARY OSTEOARTHRITIS OF KNEE: Primary | ICD-10-CM

## 2019-09-26 DIAGNOSIS — G89.29 CHRONIC PAIN OF BOTH KNEES: ICD-10-CM

## 2019-09-26 DIAGNOSIS — M25.561 CHRONIC PAIN OF BOTH KNEES: ICD-10-CM

## 2019-09-26 PROCEDURE — 20610 DRAIN/INJ JOINT/BURSA W/O US: CPT | Performed by: PHYSICIAN ASSISTANT

## 2019-09-26 NOTE — PROGRESS NOTES
Assessment/Plan:  1  Bilateral primary osteoarthritis of knee  Large joint arthrocentesis   2  Chronic pain of both knees  Large joint arthrocentesis     Patient is here for bilateral knee Synvisc injection #3  She has been tolerating the series of injections well  She consented to and underwent the 3rd and final Synvisc injections for each knee today without difficulty or complication  Post injection instructions were provided  We will plan to see her back in 6 months pending the efficacy of today's injections  All of her questions were addressed  Large joint arthrocentesis: bilateral knee  Date/Time: 9/26/2019 1:49 PM  Consent given by: patient  Site marked: site marked  Timeout: Immediately prior to procedure a time out was called to verify the correct patient, procedure, equipment, support staff and site/side marked as required   Supporting Documentation  Indications: pain   Procedure Details  Location: knee - bilateral knee  Preparation: Patient was prepped and draped in the usual sterile fashion  Needle size: 20 G  Ultrasound guidance: no  Approach: anterolateral    Medications (Right): 16 mg hylan 16 MG/2MLMedications (Left): 16 mg hylan 16 MG/2ML   Patient tolerance: patient tolerated the procedure well with no immediate complications  Dressing:  Sterile dressing applied        Subjective:  Bilateral knee Synvisc injection #2    Patient ID: Xu Springer is a 70 y o  female  HPI  Patient is here for 3rd Synvisc injection in her bilateral knees  She denies any adverse effects  Review of Systems   Constitutional: Positive for activity change, chills and fever  HENT: Negative  Eyes: Negative  Respiratory: Negative  Cardiovascular: Negative  Gastrointestinal: Negative  Endocrine: Negative  Musculoskeletal: Positive for arthralgias  Skin: Negative  Neurological: Negative  Psychiatric/Behavioral: Negative            Past Medical History:   Diagnosis Date    Atrial fibrillation ([facility] [address] )     Carrero's esophagus     last assessed: 1/23/2018    Breast cancer ([facility] [address] )     stage 1 (left), given adjuvant radiation with Arimidex x 5 years    Cancer [facility])     Left Breast, Lumpectomy    Cataract     last assessed: 3/11/2014    Dysplasia of toenail     last assessed: 8/29/2017    Esophageal reflux     GERD (gastroesophageal reflux disease)     Gross hematuria     last assessed: 2/19/2015    Hematuria     Hiatal hernia     History of radiation therapy     Hypertension     Irregular heart beat     AFIB    Mixed sensory-motor polyneuropathy     Neuropathy     Obesity     Pacemaker     Paroxysmal atrial fibrillation (HCC)     Peripheral neuropathy     Rectal bleeding     Restless leg syndrome     Shortness of breath     last assessed: 1/11/2016       Past Surgical History:   Procedure Laterality Date    BREAST BIOPSY      BREAST LUMPECTOMY Left     onset: 2006    BREAST SURGERY      CARDIAC PACEMAKER PLACEMENT      x 3 2006    CATARACT EXTRACTION      COLONOSCOPY      CYSTOSCOPY  04/04/2014    diagnostic    HYSTERECTOMY      ALISON BSO; due to fibroid uterus; age 38   [facility] KNEE CARTILAGE SURGERY      excision lesion of meniscus or capsule knee    KNEE SURGERY      OOPHORECTOMY Bilateral     OTHER SURGICAL HISTORY      radiation therapy    [place] COLONOSCOPY FLX DX W/COLLJ SPEC WHEN PFRMD N/A 2/8/2017    Procedure: COLONOSCOPY;  Surgeon: Omari Witt MD;  [facility]; Service: Gastroenterology    [place] ESOPHAGOGASTRODUODENOSCOPY TRANSORAL DIAGNOSTIC N/A 9/20/2017    Procedure: ESOPHAGOGASTRODUODENOSCOPY (EGD); Surgeon: Jazmyn Sherwood MD;  [facility];   Service: Gastroenterology    UPPER GASTROINTESTINAL ENDOSCOPY         Family History   Problem Relation Age of Onset    Hypertension Mother     Heart disease Father     Aneurysm Father     Coronary artery disease Father         in his 76s with aneurysm    Aortic aneurysm Father         abdominal    Scleroderma Sister     Breast cancer Sister 76    Hypertension Sister     No Known Problems Son     Testicular cancer Son     Thyroid cancer Son     Colon cancer Maternal Aunt     Colon cancer Maternal Aunt     Alcohol abuse Neg Hx     Substance Abuse Neg Hx     Mental illness Neg Hx     Depression Neg Hx        Social History     Occupational History    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      Comment: retired   Tobacco Use    Smoking status: Former Smoker     Years: 25 00     Types: Cigarettes     Last attempt to quit: 1980     Years since quittin 0    Smokeless tobacco: Never Used    Tobacco comment: Quit over 30 years ago; quit age 41   Substance and Sexual Activity    Alcohol use: Not Currently    Drug use: No    Sexual activity: Not on file         Current Outpatient Medications:     Acetaminophen (TYLENOL ARTHRITIS PAIN PO), Take 650 mg by mouth 2 (two) times a day, Disp: , Rfl:     Calcium Acetate, Phos Binder, (CALCIUM ACETATE PO), Take by mouth daily  , Disp: , Rfl:     Cholecalciferol (VITAMIN D3) 5000 units CAPS, Take by mouth daily , Disp: , Rfl:     clobetasol (TEMOVATE) 0 05 % cream, Apply topically once a week  , Disp: , Rfl:     furosemide (LASIX) 40 mg tablet, TAKE 1 TABLET BY MOUTH EVERY DAY, Disp: 90 tablet, Rfl: 0    gabapentin (NEURONTIN) 800 mg tablet, Take 1 tablet (800 mg total) by mouth 4 (four) times a day, Disp: 360 tablet, Rfl: 0    lisinopril (ZESTRIL) 40 mg tablet, Take 1 tablet (40 mg total) by mouth daily As directed, Disp: 90 tablet, Rfl: 3    loratadine (CLARITIN) 10 mg tablet, Take 10 mg by mouth daily, Disp: , Rfl:     metoprolol tartrate (LOPRESSOR) 50 mg tablet, TAKE 1 & 1/2 TABLETS BY MOUTH TWICE A DAY, Disp: 270 tablet, Rfl: 2    multivitamin (THERAGRAN) TABS, Take 1 tablet by mouth daily, Disp: , Rfl:     pantoprazole (PROTONIX) 20 mg tablet, Take 1 tablet (20 mg total) by mouth daily, Disp: 30 tablet, Rfl: 0   pramipexole (MIRAPEX) 0 5 mg tablet, One and half tablet at 5:00 p m  and 10:00 p m , Disp: 270 tablet, Rfl: 0    Probiotic Product (PROBIOTIC PO), Take by mouth daily , Disp: , Rfl:     tobramycin-dexamethasone (TOBRADEX) ophthalmic suspension, INSTILL 1 DROP INTO LEFT EYE 4 TIMES A DAY AS DIRECTED, Disp: , Rfl: 1    warfarin (COUMADIN) 7 5 mg tablet, Take 7 5 mg by mouth daily, Disp: , Rfl:     Allergies   Allergen Reactions    Cephalexin Rash    Duloxetine Hcl Other (See Comments)     Facial pins and needles sensation    Erythromycin Rash    Levofloxacin Other (See Comments)     Muscular aches    Penicillins Rash    Savella [Milnacipran] Rash    Sulfa Antibiotics Rash       Objective: There were no vitals filed for this visit  There is no height or weight on file to calculate BMI  Right Knee Exam     Muscle Strength   The patient has normal right knee strength  Tenderness   The patient is experiencing tenderness in the lateral joint line, patella and medial joint line  Range of Motion   Extension:  0 normal   Flexion:  120 normal     Tests   Ned:  Medial - negative Lateral - negative  Varus: negative Valgus: negative  Drawer:  Anterior - negative    Posterior - negative  Patellar apprehension: negative    Other   Erythema: absent  Scars: absent  Sensation: normal  Pulse: present  Swelling: none  Effusion: no effusion present    Comments:  Crepitance on AROM and PROM-parapatellar  - PFG  Grossly distally NVI  Significant difficulty standing from a seated position  Ambulates slowly with a symmetrical gait, drastically improved from visit 6 weeks ago      Left Knee Exam     Muscle Strength   The patient has normal left knee strength  Tenderness   The patient is experiencing tenderness in the lateral joint line, patella and medial joint line      Range of Motion   Extension:  0 normal   Flexion: normal Left knee flexion: 115°     Tests   Ned:  Medial - negative Lateral - negative  Varus: negative Valgus: negative  Drawer:  Anterior - negative     Posterior - negative  Patellar apprehension: negative    Other   Erythema: absent  Scars: absent  Sensation: normal  Pulse: present  Swelling: none  Effusion: no effusion present    Comments:  Crepitance on AROM and PROM-parapatellar  + PFG  Grossly distally NVI            Observations   Left Knee   Negative for effusion  Right Knee   Negative for effusion  Physical Exam   Constitutional: She is oriented to person, place, and time  She appears well-developed and well-nourished  There is no height or weight on file to calculate BMI  HENT:   Head: Normocephalic and atraumatic  Eyes: EOM are normal    Neck: Normal range of motion  Cardiovascular: Intact distal pulses  Pulmonary/Chest: Effort normal    Musculoskeletal:        Right knee: She exhibits no effusion  Left knee: She exhibits no effusion  See ortho exam   Neurological: She is alert and oriented to person, place, and time  Skin: Skin is warm and dry  Psychiatric: She has a normal mood and affect  Her behavior is normal  Judgment and thought content normal    Nursing note and vitals reviewed

## 2019-09-27 ENCOUNTER — APPOINTMENT (OUTPATIENT)
Dept: RADIOLOGY | Facility: CLINIC | Age: 79
End: 2019-09-27
Payer: MEDICARE

## 2019-09-27 ENCOUNTER — OFFICE VISIT (OUTPATIENT)
Dept: URGENT CARE | Facility: CLINIC | Age: 79
End: 2019-09-27
Payer: MEDICARE

## 2019-09-27 VITALS
BODY MASS INDEX: 38.58 KG/M2 | OXYGEN SATURATION: 100 % | DIASTOLIC BLOOD PRESSURE: 78 MMHG | SYSTOLIC BLOOD PRESSURE: 114 MMHG | RESPIRATION RATE: 16 BRPM | WEIGHT: 226 LBS | TEMPERATURE: 98.3 F | HEART RATE: 86 BPM | HEIGHT: 64 IN

## 2019-09-27 DIAGNOSIS — R06.2 WHEEZING: ICD-10-CM

## 2019-09-27 DIAGNOSIS — J06.9 ACUTE URI: ICD-10-CM

## 2019-09-27 DIAGNOSIS — R06.2 WHEEZING: Primary | ICD-10-CM

## 2019-09-27 PROCEDURE — 99215 OFFICE O/P EST HI 40 MIN: CPT | Performed by: PHYSICIAN ASSISTANT

## 2019-09-27 PROCEDURE — 71046 X-RAY EXAM CHEST 2 VIEWS: CPT

## 2019-09-27 PROCEDURE — 94640 AIRWAY INHALATION TREATMENT: CPT | Performed by: PHYSICIAN ASSISTANT

## 2019-09-27 RX ADMIN — Medication 0.5 MG: at 10:03

## 2019-09-27 NOTE — PROGRESS NOTES
[address]        NAME: Juanpablo Bingham is a 70 y o  female  : 1940    MRN: [id number]  DATE: 2019  TIME: 10:43 AM    Assessment and Plan   Wheezing [R06 2]  1  Wheezing  XR chest pa & lateral    ipratropium (ATROVENT) 0 02 % inhalation solution 0 5 mg    budesonide (PULMICORT) 90 MCG/ACT inhaler    Mini neb   2  Acute URI       Patient Instructions   Use the inhaler as directed  Ensure that you prime the inhaler prior to first use and rinse your mouth after each use  Continue Tylenol for fever and discomfort relief  Begin an expectorant such as guaifenesin for nasal congestion  Saltwater gargles, warm tea with honey, and throat lozenges may be relieving of throat discomfort  Use a cool mist humidifier at bedtime, turning on hours prior to bed with your bedroom doors shut for maximum relief  Follow up with your family doctor in 3-5 days  Proceed to the ER if symptoms worsen  Discussed with patient that signs and symptoms are consistent with viral respiratory infection  Based on symptom improvement today infection is likely resolving with conservative therapies  However, given diffuse wheezing on presentation to office will provide an inhaled steroid to reduce bronchial inflammation and hopefully alleviate some intermittent spasm  Advised to continue conservative therapies with the addition of the steroid inhaler  Notified she could take plain guaifenesin to help alleviate nasal congestion if she has trouble expect draining this on her own but discouraged use of any medications with cardiac side effects  Advised to check with pharmacist before purchasing any medication  The diagnosis, etiology, expected course of illness, and treatment plan were reviewed  All questions answered  Precautions given  Patient verbalized understanding and agreement with the treatment plan    Chief Complaint     Chief Complaint   Patient presents with   Rockney Borer Like Symptoms     Tuesday  had sore throat, fever, coughing,Wednesday sweats, chills,mild bloody nose , SOB      History of Present Illness     69 y/o female presenting with c/o sore throat x 3 days  Reports a fever, tmax 100 4, chills, sweats, fatigue, NC and a productive cough beginning the following day  Cough has been productive since onset and worse with lying down  Pt states she awoke today feeling better, noting decreased symptom severity, cough relief since getting out of bed, and less F/C/S  Reports continued fatigue and a single episode of diarrhea today  States she has on occasion felt SOB, but is unsure of provocative cause, denying association with positioning or activity  No wheezing, CP, palpitations, ABD pain, N/V  She has attempted treating with Tylenol, noting relief of discomfort  None thus far today  No sick contacts or recent travel  Nonsmoker  Hasn't yet had flu shot  Review of Systems   Review of Systems   Constitutional: Positive for chills, diaphoresis and fever  HENT: Positive for congestion and sore throat  Negative for ear pain, postnasal drip and rhinorrhea  Respiratory: Positive for cough and shortness of breath  Negative for wheezing  Cardiovascular: Negative for chest pain and palpitations  Gastrointestinal: Negative for abdominal pain, diarrhea, nausea and vomiting  Musculoskeletal: Negative for myalgias  Skin: Negative for rash  Neurological: Positive for headaches  Negative for dizziness and light-headedness           Current Medications       Current Outpatient Medications:     Acetaminophen (TYLENOL ARTHRITIS PAIN PO), Take 650 mg by mouth 2 (two) times a day, Disp: , Rfl:     Calcium Acetate, Phos Binder, (CALCIUM ACETATE PO), Take by mouth daily  , Disp: , Rfl:     Cholecalciferol (VITAMIN D3) 5000 units CAPS, Take by mouth daily , Disp: , Rfl:     clobetasol (TEMOVATE) 0 05 % cream, Apply topically once a week  , Disp: , Rfl:     furosemide (LASIX) 40 mg tablet, TAKE 1 TABLET BY MOUTH EVERY DAY, Disp: 90 tablet, Rfl: 0    gabapentin (NEURONTIN) 800 mg tablet, Take 1 tablet (800 mg total) by mouth 4 (four) times a day, Disp: 360 tablet, Rfl: 0    loratadine (CLARITIN) 10 mg tablet, Take 10 mg by mouth daily, Disp: , Rfl:     metoprolol tartrate (LOPRESSOR) 50 mg tablet, TAKE 1 & 1/2 TABLETS BY MOUTH TWICE A DAY, Disp: 270 tablet, Rfl: 2    multivitamin (THERAGRAN) TABS, Take 1 tablet by mouth daily, Disp: , Rfl:     pramipexole (MIRAPEX) 0 5 mg tablet, One and half tablet at 5:00 p m  and 10:00 p m , Disp: 270 tablet, Rfl: 0    Probiotic Product (PROBIOTIC PO), Take by mouth daily , Disp: , Rfl:     warfarin (COUMADIN) 7 5 mg tablet, Take 7 5 mg by mouth daily, Disp: , Rfl:     budesonide (PULMICORT) 90 MCG/ACT inhaler, Inhale 1-2 puffs 2 (two) times a day, Disp: 1 Inhaler, Rfl: 0    lisinopril (ZESTRIL) 40 mg tablet, Take 1 tablet (40 mg total) by mouth daily As directed, Disp: 90 tablet, Rfl: 3    pantoprazole (PROTONIX) 20 mg tablet, Take 1 tablet (20 mg total) by mouth daily (Patient not taking: Reported on 9/27/2019), Disp: 30 tablet, Rfl: 0    tobramycin-dexamethasone (TOBRADEX) ophthalmic suspension, INSTILL 1 DROP INTO LEFT EYE 4 TIMES A DAY AS DIRECTED, Disp: , Rfl: 1  No current facility-administered medications for this visit       Current Allergies     Allergies as of 09/27/2019 - Reviewed 09/27/2019   Allergen Reaction Noted    Cephalexin Rash 03/11/2015    Duloxetine hcl Other (See Comments) 09/20/2017    Erythromycin Rash 02/07/2017    Levofloxacin Other (See Comments) 09/20/2017    Penicillins Rash 02/07/2017    Savella [milnacipran] Rash 04/11/2018    Sulfa antibiotics Rash 02/07/2017            The following portions of the patient's history were reviewed and updated as appropriate: allergies, current medications, past family history, past medical history, past social history, past surgical history and problem list      Past Medical History:   Diagnosis Date    [facility]rial fibrillation ([facility] [address] [facility]     Carrero's esophagus     last assessed: 1/23/2018    Breast cancer ([facility] [address] )     stage 1 (left), given adjuvant radiation with Arimidex x 5 years    Cancer [facility])     Left Breast, Lumpectomy    Cataract     last assessed: 3/11/2014    Dysplasia of toenail     last assessed: 8/29/2017    Esophageal reflux     GERD (gastroesophageal reflux disease)     Gross hematuria     last assessed: 2/19/2015    Hematuria     Hiatal hernia     History of radiation therapy     Hypertension     Irregular heart beat     AFIB    Mixed sensory-motor polyneuropathy     Neuropathy     Obesity     Pacemaker     Paroxysmal atrial fibrillation (HCC)     Peripheral neuropathy     Rectal bleeding     Restless leg syndrome     Shortness of breath     last assessed: 1/11/2016       Past Surgical History:   Procedure Laterality Date    BREAST BIOPSY      BREAST LUMPECTOMY Left     onset: 2006    BREAST SURGERY      CARDIAC PACEMAKER PLACEMENT      x 3 2006    CATARACT EXTRACTION      COLONOSCOPY      CYSTOSCOPY  04/04/2014    diagnostic    HYSTERECTOMY      ALISON BSO; due to fibroid uterus; age 38   [de-identified] KNEE CARTILAGE SURGERY      excision lesion of meniscus or capsule knee    KNEE SURGERY      OOPHORECTOMY Bilateral     OTHER SURGICAL HISTORY      radiation therapy    [place] COLONOSCOPY FLX DX W/COLLJ SPEC WHEN PFRMD N/A 2/8/2017    Procedure: COLONOSCOPY;  Surgeon: Sindi Perez MD;  [facility]; Service: Gastroenterology    [place] ESOPHAGOGASTRODUODENOSCOPY TRANSORAL DIAGNOSTIC N/A 9/20/2017    Procedure: ESOPHAGOGASTRODUODENOSCOPY (EGD); Surgeon: Vanesa Kirkland MD;  [facility];   Service: Gastroenterology    UPPER GASTROINTESTINAL ENDOSCOPY         Family History   Problem Relation Age of Onset    Hypertension Mother     Heart disease Father     Aneurysm Father     Coronary artery disease Father         in his 76s with aneurysm    Aortic aneurysm Father abdominal    Scleroderma Sister     Breast cancer Sister 76    Hypertension Sister     No Known Problems Son     Testicular cancer Son     Thyroid cancer Son     Colon cancer Maternal Aunt     Colon cancer Maternal Aunt     Alcohol abuse Neg Hx     Substance Abuse Neg Hx     Mental illness Neg Hx     Depression Neg Hx      Medications have been verified  Objective   /78   Pulse 86   Temp 98 3 °F (36 8 °C)   Resp 16   Ht 5' 4" (1 626 m)   Wt 103 kg (226 lb)   SpO2 100%   BMI 38 79 kg/m²        Physical Exam     Physical Exam   Constitutional: She is oriented to person, place, and time  Vital signs are normal  She appears well-developed and well-nourished  She is cooperative  She does not appear ill  No distress  HENT:   Head: Normocephalic and atraumatic  Right Ear: Hearing, tympanic membrane, external ear and ear canal normal  No middle ear effusion  Left Ear: Hearing, tympanic membrane, external ear and ear canal normal   No middle ear effusion  Nose: Nose normal  No mucosal edema or rhinorrhea  Mouth/Throat: Uvula is midline, oropharynx is clear and moist and mucous membranes are normal  Mucous membranes are not pale, not dry and not cyanotic  No oral lesions  No uvula swelling  No oropharyngeal exudate, posterior oropharyngeal edema, posterior oropharyngeal erythema or tonsillar abscesses  Tonsils are 1+ on the right  Tonsils are 1+ on the left  No tonsillar exudate  NC and PND  Eyes: Conjunctivae and lids are normal  Right eye exhibits no discharge and no exudate  Left eye exhibits no discharge and no exudate  Neck: Trachea normal and phonation normal  Neck supple  No tracheal tenderness present  No neck rigidity  No edema and no erythema present  Cardiovascular: Normal rate, regular rhythm and normal heart sounds  Exam reveals no gallop, no distant heart sounds and no friction rub  No murmur heard  Pulmonary/Chest: Effort normal  No stridor   No respiratory distress  She has no decreased breath sounds  She has wheezes (diffuse expiratory wheeze  )  She has no rhonchi  She has no rales  Abdominal: Soft  Bowel sounds are normal  She exhibits no distension and no mass  There is no tenderness  There is no rigidity, no rebound and no guarding  Lymphadenopathy:     She has no cervical adenopathy  Neurological: She is alert and oriented to person, place, and time  She is not disoriented  No cranial nerve deficit  She exhibits normal muscle tone  Coordination normal    Skin: Skin is warm, dry and intact  No rash noted  She is not diaphoretic  No erythema  No pallor  Psychiatric: She has a normal mood and affect  Her behavior is normal  Judgment and thought content normal    Nursing note and vitals reviewed  Mini neb  Performed by: Herminia Goodwin PA-C  Authorized by: Herminia Goodwin PA-C     Number of treatments:  1  Treatment 1:   Pre-Procedure     Symptoms:  Wheezing, shortness of breath and cough    Lung Sounds:  Diffuse expiratory wheeze  HR:  86    RR:  16    SP02:  100    Medication Administered: Atrovent 0 5 mg  Post-Procedure     Post-treatment symptoms: States she feels better, more open with decreased chest heaviness, "lighter"  Lung sounds:  CTA throughout       HR:  88    SP02:  100

## 2019-09-27 NOTE — PATIENT INSTRUCTIONS
Use the inhaler as directed  Ensure that you prime the inhaler prior to first use and rinse your mouth after each use  Continue Tylenol for fever and discomfort relief  Begin an expectorant such as guaifenesin for nasal congestion  Saltwater gargles, warm tea with honey, and throat lozenges may be relieving of throat discomfort  Use a cool mist humidifier at bedtime, turning on hours prior to bed with your bedroom doors shut for maximum relief  Follow up with your family doctor in 3-5 days  Proceed to the ER if symptoms worsen

## 2019-09-27 NOTE — PROGRESS NOTES
Mini neb  Performed by: Arun Alford RN  Authorized by: Srikanth Coombs PA-C     Treatment 1:   Pre-Procedure     Symptoms:  Wheezing, shortness of breath and cough    Medication Administered:  Albuterol 2 5 mg    mini neb given pt tolerated well

## 2019-09-30 ENCOUNTER — TELEPHONE (OUTPATIENT)
Dept: INTERNAL MEDICINE CLINIC | Facility: CLINIC | Age: 79
End: 2019-09-30

## 2019-09-30 NOTE — TELEPHONE ENCOUNTER
Pt  Julia Mezadacia to [facility] on 9/27  Was diagnosed with wheezing and acute URI  Pt  Is on Budesonide inhaler  She was told she could also use an expectorant  She doesn't have a fever and does feel better  Wheezing is better  She does hear crackling noise when she goes to sleep  Do you want her give it another few days or OV now?

## 2019-09-30 NOTE — TELEPHONE ENCOUNTER
Please keep using the inhaler at least once a day for the next week  You can use the cough medicine as needed  If not better by Wednesday, please call for an appointment

## 2019-10-02 ENCOUNTER — OFFICE VISIT (OUTPATIENT)
Dept: PODIATRY | Facility: CLINIC | Age: 79
End: 2019-10-02
Payer: MEDICARE

## 2019-10-02 ENCOUNTER — TELEPHONE (OUTPATIENT)
Dept: INTERNAL MEDICINE CLINIC | Facility: CLINIC | Age: 79
End: 2019-10-02

## 2019-10-02 VITALS
HEIGHT: 64 IN | DIASTOLIC BLOOD PRESSURE: 78 MMHG | WEIGHT: 226 LBS | SYSTOLIC BLOOD PRESSURE: 114 MMHG | BODY MASS INDEX: 38.58 KG/M2

## 2019-10-02 DIAGNOSIS — L84 CORNS: ICD-10-CM

## 2019-10-02 DIAGNOSIS — M79.672 PAIN IN BOTH FEET: ICD-10-CM

## 2019-10-02 DIAGNOSIS — R60.9 CHRONIC EDEMA: ICD-10-CM

## 2019-10-02 DIAGNOSIS — J06.9 UPPER RESPIRATORY TRACT INFECTION, UNSPECIFIED TYPE: Primary | ICD-10-CM

## 2019-10-02 DIAGNOSIS — M54.16 RADICULOPATHY OF LUMBAR REGION: ICD-10-CM

## 2019-10-02 DIAGNOSIS — E11.51 TYPE 2 DIABETES MELLITUS WITH DIABETIC PERIPHERAL ANGIOPATHY WITHOUT GANGRENE, WITHOUT LONG-TERM CURRENT USE OF INSULIN (HCC): Primary | ICD-10-CM

## 2019-10-02 DIAGNOSIS — B35.1 ONYCHOMYCOSIS: ICD-10-CM

## 2019-10-02 DIAGNOSIS — M79.671 PAIN IN BOTH FEET: ICD-10-CM

## 2019-10-02 PROCEDURE — 99213 OFFICE O/P EST LOW 20 MIN: CPT | Performed by: PODIATRIST

## 2019-10-02 RX ORDER — DOXYCYCLINE 100 MG/1
100 CAPSULE ORAL 2 TIMES DAILY
Qty: 14 CAPSULE | Refills: 0 | Status: SHIPPED | OUTPATIENT
Start: 2019-10-02 | End: 2019-10-09

## 2019-10-02 NOTE — TELEPHONE ENCOUNTER
Pt  Is still coughing a lot  Is not bringing up as much phlegm-it is light yellow now instead of green-yellow  Still has crackling in her chest-not as bad

## 2019-10-02 NOTE — PROGRESS NOTES
Assessment/Plan:  Painful calluses   Diabetic neuropathy   Peripheral vascular disease   Chronic edema  Plan   Diabetic foot exam performed   All mycotic nails debrided   Plantar calluses debrided without pain or complication  Diagnoses and all orders for this visit:     Diabetic polyneuropathy associated with type 2 diabetes mellitus (HCC)     Corns     Pain in both feet     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[address] )     Chronic edema   Rule out deep venous insufficiency       Discussion/Summary  The patient was counseled regarding instructions for management,-- prognosis,-- patient and family education,-- risks and benefits of treatment options,-- importance of compliance with treatment  Patient is able to Self-Care  Possible side effects of new medications were reviewed with the patient/guardian today  The treatment plan was reviewed with the patient/guardian  The patient/guardian understands and agrees with the treatment plan      Chief Complaint  Routine nail care      History of Present Illness  HPI: Patient is doing well with Cymbalta however she began have facial tingling  She discontinued medicine   However the medication was relieving her neuropathic pain       Review of Systems     ROS reviewed       Active Problems     1  Achilles tendinitis of left lower extremity (726 71) (M76 62)   2  Acquired ankle/foot deformity (736 70) (M21 969)   3  AF (paroxysmal atrial fibrillation) (427 31) (I48 0)   4  Anticoagulant long-term use (V58 61) (Z79 01)   5  Arthritis (716 90) (M19 90)   6  At risk for bone density loss (V49 89) (Z91 89)   7  Atrial fibrillation (427 31) (I48 91)   8  History of Breast Cancer (V10 3)   9  Difficulty walking (719 7) (R26 2)   10  Encounter for screening mammogram for breast cancer (V76 12) (Z12 31)   11  Esophageal reflux (530 81) (K21 9)   12  Foot pain, bilateral (729 5) (M79 671,M79 672)   13  Hiatal hernia (553 3) (K44 9)   14  History of Carrero's esophagus (V12 79) (Z87 19)   15  History of fall (V15 88) (Z91 81)   16  Hypertension (401 9) (I10)   17  Leg pain, left (729 5) (H70 453)   18  Lichen sclerosus et atrophicus (701 0) (L90 0)   19  Lumbar radiculopathy (724 4) (M54 16)   20  Multiple lung nodules (793 19) (R91 8)   21  Obesity (278 00) (E66 9)   22  Onychomycosis (110 1) (B35 1)   23  Osteopenia (733 90) (M85 80)   24  Pacemaker (V45 01) (Z95 0)   25  Peripheral neuropathy (356 9) (G62 9)   26  Pes planus of both feet (734) (M21 41,M21 42)   27  Plantar fasciitis of left foot (728 71) (M72 2)   28  Restless legs syndrome (333 94) (G25 81)     Past Medical History   · History of Abnormal glucose (790 29) (R73 09)   · Atrial fibrillation (427 31) (I48 91)   · History of Breast Cancer (V10 3)   · 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)   · Esophageal reflux (530 81) (K21 9)   · Denied: History of Exposure To STD   · History of Gross hematuria (599 71) (R31 0)   · History of Hematoma (924 9) (T14 8XXA)   · History of Hematoma of lower extremity, right, initial encounter (924 5) (S80 11XA)   · History of backache (V13 59) (Z87 39)   · History of Carrero's esophagus (V12 79) (Z87 19)   · History of cataract (V12 49) (Z86 69)   · History of chest pain (V13 89) (Z87 898)   · History of fall (V15 88) (Z91 81)   · History of hematuria (V13 09) (Z87 448)   · History of postmenopausal hormone replacement therapy (V87 49) (Z92 29)   · History of shortness of breath (V13 89) (G76 117)   · History of urinary incontinence (V13 09) (Z87 898)   · History of Neck pain (723 1) (M54 2)   · Normal delivery (650) (O80,Z37  9)   · History of Right knee pain (719 46) (M25 561)   · History of Ringing In The Ears (Tinnitus) (388 30)   · History of Skin rash (782 1) (R21)   · History of Traumatic blister of right lower extremity, initial encounter (916 2) (P22 803M)   · History of UTI (lower urinary tract infection) (599 0) (N39 0)     The active problems and past medical history were reviewed and updated today       Surgical History   · Denied: History of Abnormal Pap Smear Of Cervix   · History of Breast Surgery Lumpectomy   · History of Cataract Surgery   · History of Diagnostic Cystoscopy   · History of Excision Lesion Of Meniscus Or Capsule Knee   · History of Pacemaker - Pulse Generator Replacement   · History of Pacemaker Placement   · History of Pacemaker Placement   · History of Radiation Therapy   · History of Total Abdominal Hysterectomy With Removal Of Both Ovaries     The surgical history was reviewed and updated today        Family History  Mother    · Denied: Family history of Alcoholism and drug addiction in family   · Denied: Family history of Anxiety and depression  Father    · Denied: Family history of Alcoholism and drug addiction in family   · Denied: Family history of Anxiety and depression   · Family history of Coronary Artery Disease (V17 49)   · Family history of abdominal aortic aneurysm (V17 49) (Z82 49)  Child    · Denied: Family history of Alcoholism and drug addiction in family   · Denied: Family history of Anxiety and depression  Sibling    · Denied: Family history of Alcoholism and drug addiction in family   · Denied: Family history of Anxiety and depression  Sister    · Family history of Breast Cancer (V16 3)  Maternal Aunt    · Family history of Colon Cancer (V16 0)   · Family history of Colon Cancer (V16 0)  Family History    · Denied: Family history of Diabetes Mellitus   · Denied: Family history of Stroke Syndrome     The family history was reviewed and updated today        Social History      · Being A Social Drinker   · Denied: History of Drug Use   · Former smoker (V15 82) (D52 690)   · 25 pack years, quit age 41   · Marital History - Currently    · Retired From Work   · 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  The social history was reviewed and updated today       The medication list was reviewed and updated today        Allergies  1  duloxetine   2  LevoFLOXacin TABS   3  Cephalexin CAPS   4  Erythromycin Base TABS   5  Keflex TABS   6  Penicillins   7  Sulfa Drugs        Physical Exam  Left Foot: Appearance: Normal except as noted: excessive pronation-- and-- pes planus  Tenderness: None except the lisfranc joint-- and-- medial longitudinal arch  ROM: subtalar motion was restricted  Right Foot: Appearance: Normal except as noted: excessive pronation-- and-- pes planus  Tenderness: None except the lisfranc joint-- and-- medial longitudinal arch  ROM: subtalar motion was restricted   Left Ankle: ROM: limited ROM in all planes   Right Ankle: ROM: limited ROM in all planes   Neurological Exam: performed  Light touch was decreased bilaterally  Vibratory sensation was decreased in both first metatarsophalangeal joints  Deep tendon reflexes: achilles reflex absent bilateraly-- and-- 4/5 L5 testing bilateral    Vascular Exam: performed Dorsalis pedis pulses were diminished bilaterally  Posterior tibial pulses were diminished bilaterally  Dependence rubor was present bilaterally  Capillary refill time was Negative digital hair noted, but-- between 1-3 seconds bilaterally  Toenails: All of the toenails were elongated,-- hypertrophied,-- discolored-- and--   Hyperkeratosis: present on both first toes  Shoe Gear Evaluation: performed ()  Recommendation(s): SAS style       Patient's shoes and socks removed  Right Foot/Ankle   Right Foot Inspection        Sensory   Vibration: diminished  Proprioception: diminished      Vascular  Capillary refills: elevated        Left Foot/Ankle  Left Foot Inspection                    Sensory   Vibration: diminished  Proprioception: diminished     Vascular  Capillary refills: elevated     Assign Risk Category:  Deformity present; Loss of protective sensation; Weak pulses       Risk: 2

## 2019-10-02 NOTE — TELEPHONE ENCOUNTER
Are you still using the steroid inhaler? Would you like to make an appointment to be seen again or just start antibiotics?

## 2019-10-08 ENCOUNTER — IN-CLINIC DEVICE VISIT (OUTPATIENT)
Dept: CARDIOLOGY CLINIC | Facility: CLINIC | Age: 79
End: 2019-10-08
Payer: MEDICARE

## 2019-10-08 DIAGNOSIS — I48.20 CHRONIC ATRIAL FIBRILLATION (HCC): Primary | ICD-10-CM

## 2019-10-08 DIAGNOSIS — Z95.0 CARDIAC PACEMAKER: ICD-10-CM

## 2019-10-08 PROCEDURE — 93279 PRGRMG DEV EVAL PM/LDLS PM: CPT | Performed by: INTERNAL MEDICINE

## 2019-10-08 NOTE — PROGRESS NOTES
Results for orders placed or performed in visit on 10/08/19   Cardiac EP device report    Narrative    MDT-SINGLE-PM  DEVICE INTERROGATED IN THE [facility] OFFICE: BATTERY VOLTAGE ADEQUATE (3 5 YRS)   16%  ALL LEAD PARAMETERS WITHIN NORMAL LIMITS  ALL OTHER TESTING WITHIN NORMAL LIMITS  MULTIPLE VHR EPISODES, MOST RECENT EPISODE DURATION RECORDED 12 SECS @ ~  BPM W/AVAIL  EGRM/MARKER PRESENTING AS AF/RVR  HX: CHRONIC AF & PT ON WARFARIN, METOPROLOL TART  NO PROGRAMMING CHANGES MADE TO DEVICE PARAMETERS  PACEMAKER FUNCTIONING APPROPRIATELY      EB

## 2019-10-09 ENCOUNTER — OFFICE VISIT (OUTPATIENT)
Dept: INTERNAL MEDICINE CLINIC | Facility: CLINIC | Age: 79
End: 2019-10-09
Payer: MEDICARE

## 2019-10-09 VITALS
TEMPERATURE: 97 F | HEIGHT: 64 IN | WEIGHT: 229.6 LBS | SYSTOLIC BLOOD PRESSURE: 128 MMHG | RESPIRATION RATE: 18 BRPM | DIASTOLIC BLOOD PRESSURE: 80 MMHG | BODY MASS INDEX: 39.2 KG/M2 | OXYGEN SATURATION: 98 % | HEART RATE: 75 BPM

## 2019-10-09 DIAGNOSIS — S90.414A ABRASION OF SECOND TOE OF RIGHT FOOT, INITIAL ENCOUNTER: Primary | ICD-10-CM

## 2019-10-09 DIAGNOSIS — E11.51 TYPE 2 DIABETES MELLITUS WITH DIABETIC PERIPHERAL ANGIOPATHY WITHOUT GANGRENE, WITHOUT LONG-TERM CURRENT USE OF INSULIN (HCC): ICD-10-CM

## 2019-10-09 LAB
CREAT UR-MCNC: 31 MG/DL
MICROALBUMIN UR-MCNC: 19.4 MG/L (ref 0–20)
MICROALBUMIN/CREAT 24H UR: 63 MG/G CREATININE (ref 0–30)

## 2019-10-09 PROCEDURE — 99213 OFFICE O/P EST LOW 20 MIN: CPT | Performed by: NURSE PRACTITIONER

## 2019-10-09 PROCEDURE — 82043 UR ALBUMIN QUANTITATIVE: CPT | Performed by: NURSE PRACTITIONER

## 2019-10-09 PROCEDURE — 82570 ASSAY OF URINE CREATININE: CPT | Performed by: NURSE PRACTITIONER

## 2019-10-09 NOTE — PROGRESS NOTES
Assessment/Plan:    Abrasion of right second toe   seems to be healing nicely, no s/s of infection  can continue bactroban twice daily, keep open to air as much as possible, elevate when at home   Call right away for any drainage or increased erythema      Diagnoses and all orders for this visit:    Abrasion of second toe of right foot, initial encounter    Type 2 diabetes mellitus with diabetic peripheral angiopathy without gangrene, without long-term current use of insulin (HCC)  -     Cancel: POCT urine microalbumin/creatinine  -     Microalbumin / creatinine urine ratio    Other orders  -     Cancel: HEPATITIS B VACCINE ADULT IM  -     Cancel: TDAP VACCINE GREATER THAN OR EQUAL TO 6YO IM          Subjective:      Patient ID: Arben Garner is a 70 y o  female  Here today with a wound on her second right toe   Started a week ago after wearing different shoes to the podiatrist   She has been putting bactroban on it daily   She has not noticed it getting any worse but is worried it has not healed   Denies fevers or drainage       The following portions of the patient's history were reviewed and updated as appropriate: allergies, current medications, past family history, past medical history, past social history, past surgical history and problem list     Review of Systems   Constitutional: Negative for activity change, appetite change, chills and fever  Cardiovascular: Negative for leg swelling  Skin: Positive for wound  Objective:      /80   Pulse 75   Temp (!) 97 °F (36 1 °C) (Oral)   Resp 18   Ht 5' 4" (1 626 m)   Wt 104 kg (229 lb 9 6 oz)   SpO2 98%   BMI 39 41 kg/m²          Physical Exam   Constitutional: She is oriented to person, place, and time  She appears well-developed and well-nourished  Eyes: Pupils are equal, round, and reactive to light  Neurological: She is alert and oriented to person, place, and time  Skin: Skin is warm and dry          Psychiatric: She has a normal mood and affect  Her behavior is normal    Vitals reviewed

## 2019-10-15 DIAGNOSIS — G60.8 SENSORY POLYNEUROPATHY: ICD-10-CM

## 2019-10-16 ENCOUNTER — ANTICOAG VISIT (OUTPATIENT)
Dept: CARDIOLOGY CLINIC | Facility: CLINIC | Age: 79
End: 2019-10-16

## 2019-10-16 ENCOUNTER — APPOINTMENT (OUTPATIENT)
Dept: LAB | Facility: HOSPITAL | Age: 79
End: 2019-10-16
Payer: MEDICARE

## 2019-10-16 ENCOUNTER — TRANSCRIBE ORDERS (OUTPATIENT)
Dept: ADMINISTRATIVE | Facility: HOSPITAL | Age: 79
End: 2019-10-16

## 2019-10-16 DIAGNOSIS — I10 ESSENTIAL HYPERTENSION: ICD-10-CM

## 2019-10-16 DIAGNOSIS — I48.91 ATRIAL FIBRILLATION, UNSPECIFIED TYPE (HCC): ICD-10-CM

## 2019-10-16 DIAGNOSIS — I48.20 CHRONIC ATRIAL FIBRILLATION (HCC): ICD-10-CM

## 2019-10-16 LAB
INR PPP: 2.09 (ref 0.91–1.09)
PROTHROMBIN TIME: 22.2 SECONDS (ref 9.8–12)

## 2019-10-16 PROCEDURE — 85610 PROTHROMBIN TIME: CPT

## 2019-10-16 PROCEDURE — 36415 COLL VENOUS BLD VENIPUNCTURE: CPT

## 2019-10-17 ENCOUNTER — TELEPHONE (OUTPATIENT)
Dept: INTERNAL MEDICINE CLINIC | Facility: CLINIC | Age: 79
End: 2019-10-17

## 2019-10-17 DIAGNOSIS — L98.9 SKIN LESION: ICD-10-CM

## 2019-10-17 NOTE — TELEPHONE ENCOUNTER
Pt  Calling w/ update on toe  She saw Grace Paz a couple weeks ago  It is 90% better  Pt  Also needs refill on Mupirocin ointment usp 2%  She was prescribed this by Dr Branden Chavez a long time ago, and it lasted long  She uses this when she has an open sore

## 2019-10-23 ENCOUNTER — OFFICE VISIT (OUTPATIENT)
Dept: NEUROLOGY | Facility: CLINIC | Age: 79
End: 2019-10-23
Payer: MEDICARE

## 2019-10-23 VITALS
BODY MASS INDEX: 39.09 KG/M2 | SYSTOLIC BLOOD PRESSURE: 110 MMHG | DIASTOLIC BLOOD PRESSURE: 66 MMHG | HEART RATE: 94 BPM | WEIGHT: 229 LBS | HEIGHT: 64 IN

## 2019-10-23 DIAGNOSIS — G60.8 SENSORY POLYNEUROPATHY: ICD-10-CM

## 2019-10-23 DIAGNOSIS — E11.42 DIABETIC POLYNEUROPATHY ASSOCIATED WITH TYPE 2 DIABETES MELLITUS (HCC): Primary | ICD-10-CM

## 2019-10-23 DIAGNOSIS — G25.81 RLS (RESTLESS LEGS SYNDROME): ICD-10-CM

## 2019-10-23 PROCEDURE — 99214 OFFICE O/P EST MOD 30 MIN: CPT | Performed by: PSYCHIATRY & NEUROLOGY

## 2019-10-23 RX ORDER — PRAMIPEXOLE DIHYDROCHLORIDE 0.5 MG/1
TABLET ORAL
Qty: 270 TABLET | Refills: 0 | OUTPATIENT
Start: 2019-10-23

## 2019-10-23 RX ORDER — PRAMIPEXOLE DIHYDROCHLORIDE 0.5 MG/1
TABLET ORAL
Qty: 270 TABLET | Refills: 1 | Status: SHIPPED | OUTPATIENT
Start: 2019-10-23 | End: 2020-04-02 | Stop reason: SDUPTHER

## 2019-10-23 RX ORDER — GABAPENTIN 800 MG/1
800 TABLET ORAL 4 TIMES DAILY
Qty: 360 TABLET | Refills: 1 | OUTPATIENT
Start: 2019-10-23

## 2019-10-23 RX ORDER — GABAPENTIN 800 MG/1
800 TABLET ORAL 4 TIMES DAILY
Qty: 360 TABLET | Refills: 1 | Status: SHIPPED | OUTPATIENT
Start: 2019-10-23 | End: 2020-04-02 | Stop reason: SDUPTHER

## 2019-10-23 NOTE — PROGRESS NOTES
Patient ID: Melva Vasquez is a 70 y o  female  Assessment/Plan:    Painful diabetic polyneuropathy in    Restless leg syndrome    Plan continue gabapentin 800 mg 4 times a day    Continue Mirapex 0 5 mg each tablet 1 and half tablet at 5:00 p m  And 10:00 p m  Both prescription has been sent to her pharmacy with 3 month supply each and 1 refill  I will see the patient in 4 months  Subjective:    82-year-old female followed in the office for diabetic polyneuropathy painful type and also for the restless leg syndrome  Patient reported that she has a history of plantar fasciitis and for 3 weeks early in the morning when she 1st start walking she would have a pain and 1 6 continue to walk it will subside  She denies any numbness, pins and needles sensation or tingling sensation  She also denies any sharp shooting and burning pain  Upon asking she denies feeling of walking with bunched up socks or walking on the emmanuel  Since patient has been taking the Mirapex 0 5 mg tablet 1 and half tablet twice a daily she denies any creepy crawling sensation, or any urge to move her legs  There is no itchiness in the feet or legs  And again there is no burning pain  Patient denies any other neurological symptoms upon asking each in detail except her reflex incontinence while coughing laughing or sneezing  All negative symptoms neurological ER described below,    Pt denies double vision, blurred vision, transient monocular blindness, vertigo, tinnitus, hearing loss, slurred speech, difficulty expressing and understanding, dysphasia, and focal weakness, numbness, imbalance and incoordination  Pt denies bladder and bowel urgency, frequency and incontinence         Past Medical History:   Diagnosis Date    Atrial fibrillation ([facility] )     Carrero's esophagus     last assessed: 1/23/2018    Breast cancer ([facility] [address] )     stage 1 (left), given adjuvant radiation with Arimidex x 5 years    Cancer [facility]) Left Breast, Lumpectomy    Cataract     last assessed: 3/11/2014    Dysplasia of toenail     last assessed: 2017    Esophageal reflux     GERD (gastroesophageal reflux disease)     Gross hematuria     last assessed: 2015    Hematuria     Hiatal hernia     History of radiation therapy     Hypertension     Irregular heart beat     AFIB    Mixed sensory-motor polyneuropathy     Neuropathy     Obesity     Pacemaker     Paroxysmal atrial fibrillation (HCC)     Peripheral neuropathy     Rectal bleeding     Restless leg syndrome     Shortness of breath     last assessed: 2016       Family History   Problem Relation Age of Onset    Hypertension Mother     Heart disease Father     Aneurysm Father     Coronary artery disease Father         in his 76s with aneurysm    Aortic aneurysm Father         abdominal    Scleroderma Sister     Breast cancer Sister 76    Hypertension Sister     No Known Problems Son     Testicular cancer Son     Thyroid cancer Son     Colon cancer Maternal Aunt     Colon cancer Maternal Aunt     Alcohol abuse Neg Hx     Substance Abuse Neg Hx     Mental illness Neg Hx     Depression Neg Hx    ,    Social History     Socioeconomic History    Marital status: /Civil Union     Spouse name: Not on file    Number of children: Not on file    Years of education: Not on file    Highest education level: Not on file   Occupational History    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      Comment: retired   Social Needs    Financial resource strain: Not on file    Food insecurity:     Worry: Not on file     Inability: Not on file   Hypejar needs:     Medical: Not on file     Non-medical: Not on file   Tobacco Use    Smoking status: Former Smoker     Years: 25 00     Types: Cigarettes     Last attempt to quit: 1980     Years since quittin 1    Smokeless tobacco: Never Used    Tobacco comment: Quit over 30 years ago; quit age 41   Substance and Sexual Activity    Alcohol use: Not Currently    Drug use: No    Sexual activity: Not on file   Lifestyle    Physical activity:     Days per week: Not on file     Minutes per session: Not on file    Stress: Not on file   Relationships    Social connections:     Talks on phone: Not on file     Gets together: Not on file     Attends Zoroastrian service: Not on file     Active member of club or organization: Not on file     Attends meetings of clubs or organizations: Not on file     Relationship status: Not on file    Intimate partner violence:     Fear of current or ex partner: Not on file     Emotionally abused: Not on file     Physically abused: Not on file     Forced sexual activity: Not on file   Other Topics Concern    Not on file   Social History Narrative           Current Outpatient Medications on File Prior to Visit   Medication Sig Dispense Refill    Acetaminophen (TYLENOL ARTHRITIS PAIN PO) Take 650 mg by mouth 2 (two) times a day      Calcium Acetate, Phos Binder, (CALCIUM ACETATE PO) Take by mouth daily        Cholecalciferol (VITAMIN D3) 5000 units CAPS Take by mouth daily       clobetasol (TEMOVATE) 0 05 % cream Apply topically once a week        furosemide (LASIX) 40 mg tablet TAKE 1 TABLET BY MOUTH EVERY DAY 90 tablet 0    lisinopril (ZESTRIL) 40 mg tablet Take 1 tablet (40 mg total) by mouth daily As directed 90 tablet 3    loratadine (CLARITIN) 10 mg tablet Take 10 mg by mouth daily      metoprolol tartrate (LOPRESSOR) 50 mg tablet TAKE 1 & 1/2 TABLETS BY MOUTH TWICE A  tablet 2    multivitamin (THERAGRAN) TABS Take 1 tablet by mouth daily      mupirocin (BACTROBAN) 2 % ointment Apply topically 3 (three) times a day prn 30 g 1    Probiotic Product (PROBIOTIC PO) Take by mouth daily       warfarin (COUMADIN) 7 5 mg tablet Take 7 5 mg by mouth daily      [DISCONTINUED] gabapentin (NEURONTIN) 800 mg tablet Take 1 tablet (800 mg total) by mouth 4 (four) times a day 360 tablet 0    [DISCONTINUED] pramipexole (MIRAPEX) 0 5 mg tablet One and half tablet at 5:00 p m  and 10:00 p m  270 tablet 0    budesonide (PULMICORT) 90 MCG/ACT inhaler Inhale 1-2 puffs 2 (two) times a day (Patient not taking: Reported on 10/23/2019) 1 Inhaler 0    pantoprazole (PROTONIX) 20 mg tablet Take 1 tablet (20 mg total) by mouth daily (Patient not taking: Reported on 10/23/2019) 30 tablet 0    tobramycin-dexamethasone (TOBRADEX) ophthalmic suspension INSTILL 1 DROP INTO LEFT EYE 4 TIMES A DAY AS DIRECTED  1     No current facility-administered medications on file prior to visit  Objective:    Blood pressure 110/66, pulse 94, height 5' 4" (1 626 m), weight 104 kg (229 lb)  Physical Exam   Eyes: Pupils are equal, round, and reactive to light  EOM are normal    Neck: Normal carotid pulses present  Carotid bruit is not present  Neurological: She has normal strength and normal reflexes  Psychiatric: Her speech is normal        Neurological Exam  Mental Status   Oriented to person, place, time and situation  Recent and remote memory are intact  Speech is normal  Language is fluent with no aphasia  Cranial Nerves  CN II: Visual fields full to confrontation  CN III, IV, VI: Extraocular movements intact bilaterally  Pupils equal round and reactive to light bilaterally  CN V: Facial sensation is normal   CN VII: Full and symmetric facial movement  CN VIII: Hearing is normal   CN IX, X: Palate elevates symmetrically  Normal gag reflex  CN XI: Shoulder shrug strength is normal   CN XII: Tongue midline without atrophy or fasciculations  Motor  Normal muscle bulk throughout  No fasciculations present  Normal muscle tone  Strength is 5/5 throughout all four extremities  Sensory  Sensation is intact to light touch, pinprick, vibration and proprioception in all four extremities  Light touch is normal in upper and lower extremities   Pinprick is normal in upper and lower extremities  Proprioception is normal in upper and lower extremities  Reflexes  Deep tendon reflexes are 2+ and symmetric in all four extremities with downgoing toes bilaterally  Coordination  Right: Finger-to-nose normal  Heel-to-shin normal   Left: Finger-to-nose normal  Heel-to-shin normal     Gait  Normal casual, toe, heel and tandem gait  ROS:    Review of Systems   Constitutional: Negative  HENT: Negative  Eyes: Negative  Respiratory: Negative  Cardiovascular: Negative  Gastrointestinal: Negative  Endocrine: Negative  Genitourinary: Negative  Musculoskeletal: Negative  Skin: Negative  Allergic/Immunologic: Negative  Neurological: Negative  Hematological: Negative  Psychiatric/Behavioral: Negative

## 2019-11-11 ENCOUNTER — TRANSCRIBE ORDERS (OUTPATIENT)
Dept: ADMINISTRATIVE | Facility: HOSPITAL | Age: 79
End: 2019-11-11

## 2019-11-11 ENCOUNTER — APPOINTMENT (OUTPATIENT)
Dept: LAB | Facility: HOSPITAL | Age: 79
End: 2019-11-11
Payer: MEDICARE

## 2019-11-11 ENCOUNTER — ANTICOAG VISIT (OUTPATIENT)
Dept: CARDIOLOGY CLINIC | Facility: CLINIC | Age: 79
End: 2019-11-11

## 2019-11-11 DIAGNOSIS — I48.20 CHRONIC ATRIAL FIBRILLATION (HCC): ICD-10-CM

## 2019-11-11 DIAGNOSIS — I48.91 ATRIAL FIBRILLATION, UNSPECIFIED TYPE (HCC): ICD-10-CM

## 2019-11-11 DIAGNOSIS — E11.51 TYPE 2 DIABETES MELLITUS WITH DIABETIC PERIPHERAL ANGIOPATHY WITHOUT GANGRENE, WITHOUT LONG-TERM CURRENT USE OF INSULIN (HCC): ICD-10-CM

## 2019-11-11 DIAGNOSIS — I10 ESSENTIAL HYPERTENSION, MALIGNANT: Primary | ICD-10-CM

## 2019-11-11 DIAGNOSIS — I10 ESSENTIAL HYPERTENSION: ICD-10-CM

## 2019-11-11 LAB
ANION GAP SERPL CALCULATED.3IONS-SCNC: 5 MMOL/L (ref 4–13)
BUN SERPL-MCNC: 19 MG/DL (ref 5–25)
CALCIUM SERPL-MCNC: 9.7 MG/DL (ref 8.3–10.1)
CHLORIDE SERPL-SCNC: 107 MMOL/L (ref 100–108)
CO2 SERPL-SCNC: 28 MMOL/L (ref 21–32)
CREAT SERPL-MCNC: 0.83 MG/DL (ref 0.6–1.3)
EST. AVERAGE GLUCOSE BLD GHB EST-MCNC: 111 MG/DL
GFR SERPL CREATININE-BSD FRML MDRD: 69 ML/MIN/1.73SQ M
GLUCOSE SERPL-MCNC: 90 MG/DL (ref 65–140)
HBA1C MFR BLD: 5.5 % (ref 4.2–6.3)
INR PPP: 4.09 (ref 0.91–1.09)
POTASSIUM SERPL-SCNC: 4.7 MMOL/L (ref 3.5–5.3)
PROTHROMBIN TIME: 42.6 SECONDS (ref 9.8–12)
SODIUM SERPL-SCNC: 140 MMOL/L (ref 136–145)

## 2019-11-11 PROCEDURE — 80048 BASIC METABOLIC PNL TOTAL CA: CPT

## 2019-11-11 PROCEDURE — 85610 PROTHROMBIN TIME: CPT

## 2019-11-11 PROCEDURE — 83036 HEMOGLOBIN GLYCOSYLATED A1C: CPT

## 2019-11-11 PROCEDURE — 36415 COLL VENOUS BLD VENIPUNCTURE: CPT

## 2019-11-14 ENCOUNTER — APPOINTMENT (OUTPATIENT)
Dept: LAB | Facility: HOSPITAL | Age: 79
End: 2019-11-14
Payer: MEDICARE

## 2019-11-14 LAB
CHOLEST SERPL-MCNC: 140 MG/DL (ref 50–200)
HDLC SERPL-MCNC: 35 MG/DL
LDLC SERPL CALC-MCNC: 80 MG/DL (ref 0–100)
NONHDLC SERPL-MCNC: 105 MG/DL
TRIGL SERPL-MCNC: 127 MG/DL

## 2019-11-14 PROCEDURE — 80061 LIPID PANEL: CPT | Performed by: INTERNAL MEDICINE

## 2019-11-14 PROCEDURE — 36415 COLL VENOUS BLD VENIPUNCTURE: CPT | Performed by: INTERNAL MEDICINE

## 2019-11-18 ENCOUNTER — OFFICE VISIT (OUTPATIENT)
Dept: INTERNAL MEDICINE CLINIC | Facility: CLINIC | Age: 79
End: 2019-11-18
Payer: MEDICARE

## 2019-11-18 VITALS
SYSTOLIC BLOOD PRESSURE: 120 MMHG | BODY MASS INDEX: 38.99 KG/M2 | TEMPERATURE: 98 F | HEIGHT: 64 IN | HEART RATE: 76 BPM | WEIGHT: 228.4 LBS | RESPIRATION RATE: 18 BRPM | DIASTOLIC BLOOD PRESSURE: 78 MMHG | OXYGEN SATURATION: 98 %

## 2019-11-18 DIAGNOSIS — K21.9 GASTROESOPHAGEAL REFLUX DISEASE, ESOPHAGITIS PRESENCE NOT SPECIFIED: ICD-10-CM

## 2019-11-18 DIAGNOSIS — M85.89 OSTEOPENIA OF MULTIPLE SITES: ICD-10-CM

## 2019-11-18 DIAGNOSIS — I10 ESSENTIAL HYPERTENSION: ICD-10-CM

## 2019-11-18 DIAGNOSIS — M17.0 PRIMARY OSTEOARTHRITIS OF BOTH KNEES: ICD-10-CM

## 2019-11-18 DIAGNOSIS — G25.81 RLS (RESTLESS LEGS SYNDROME): ICD-10-CM

## 2019-11-18 DIAGNOSIS — E11.42 DIABETIC POLYNEUROPATHY ASSOCIATED WITH TYPE 2 DIABETES MELLITUS (HCC): Primary | ICD-10-CM

## 2019-11-18 DIAGNOSIS — Z12.31 ENCOUNTER FOR SCREENING MAMMOGRAM FOR BREAST CANCER: ICD-10-CM

## 2019-11-18 DIAGNOSIS — E66.01 CLASS 3 SEVERE OBESITY DUE TO EXCESS CALORIES WITH SERIOUS COMORBIDITY AND BODY MASS INDEX (BMI) OF 40.0 TO 44.9 IN ADULT (HCC): ICD-10-CM

## 2019-11-18 DIAGNOSIS — I48.91 ATRIAL FIBRILLATION, UNSPECIFIED TYPE (HCC): ICD-10-CM

## 2019-11-18 DIAGNOSIS — K63.5 POLYP OF COLON, UNSPECIFIED PART OF COLON, UNSPECIFIED TYPE: ICD-10-CM

## 2019-11-18 PROBLEM — G89.29 CHRONIC PAIN OF LEFT KNEE: Status: RESOLVED | Noted: 2018-07-16 | Resolved: 2019-11-18

## 2019-11-18 PROBLEM — R10.13 EPIGASTRIC ABDOMINAL PAIN: Status: RESOLVED | Noted: 2018-11-27 | Resolved: 2019-11-18

## 2019-11-18 PROBLEM — M25.562 CHRONIC PAIN OF LEFT KNEE: Status: RESOLVED | Noted: 2018-07-16 | Resolved: 2019-11-18

## 2019-11-18 PROBLEM — R60.0 PEDAL EDEMA: Status: RESOLVED | Noted: 2018-11-27 | Resolved: 2019-11-18

## 2019-11-18 PROCEDURE — 99214 OFFICE O/P EST MOD 30 MIN: CPT | Performed by: INTERNAL MEDICINE

## 2019-11-18 NOTE — PROGRESS NOTES
Assessment/Plan:    Primary osteoarthritis of both knees  S/p injections  She is considering surgery  Diabetic polyneuropathy associated with type 2 diabetes mellitus ([place] [address] )    Lab Results   Component Value Date    HGBA1C 5 5 11/11/2019     A1c within normal   Limit desserts, chocolate  Class 3 severe obesity due to excess calories with serious comorbidity and body mass index (BMI) of 40 0 to 44 9 in [facility])  Lost 6 lbs since last visit  Keep appointment with weight mx  Atrial fibrillation ([place] [address] ) [I48 91]  Stable  On warfarin, metoprolol  RLS (restless legs syndrome)  Stable on current regimen: gabapentin, pramipexole  Sees neurology  Essential hypertension  BP stable, on metoprolol and lisinopril  Chronic edema  Improved  GERD (gastroesophageal reflux disease)  Takes ranitidine prn only  Diagnoses and all orders for this visit:    Diabetic polyneuropathy associated with type 2 diabetes mellitus ([place] [address] )    Polyp of colon, unspecified part of colon, unspecified type  -     Ambulatory referral to Gastroenterology; Future    RLS (restless legs syndrome)    Class 3 severe obesity due to excess calories with serious comorbidity and body mass index (BMI) of 40 0 to 44 9 in [facility])    Atrial fibrillation, unspecified type (HCC)  -     CBC and differential; Future  -     Comprehensive metabolic panel; Future  -     TSH, 3rd generation with Free T4 reflex; Future    Essential hypertension  -     CBC and differential; Future  -     Comprehensive metabolic panel; Future  -     TSH, 3rd generation with Free T4 reflex; Future    Osteopenia of multiple sites  -     DXA bone density spine hip and pelvis; Future    Encounter for screening mammogram for breast cancer  -     Mammo screening bilateral w cad; Future    Primary osteoarthritis of both knees    Gastroesophageal reflux disease, esophagitis presence not specified      Follow up in 5 months or as needed        Subjective:      Patient ID: Clary Quan is a 70 y o  female  Mrs Devries Casey has been doing alright  She is considering surgery for her knees next year  She reports injections did not really help  She started using a cane to help her with stair climbing  She denies any recent falls  She reports her restless legs and neuropathy has been about the same, recently saw Neurology  She denies any shortness of breath, chest pains or dizziness  She reports occasional palpitations, does not occur frequently  She has cut back on desserts, chocolates and cookies  She has an appointment with weight management  The following portions of the patient's history were reviewed and updated as appropriate: allergies, current medications, past medical history, past social history and problem list     Review of Systems   Constitutional: Negative for appetite change and fatigue  HENT: Negative for congestion and ear pain  Eyes: Negative for visual disturbance  Respiratory: Negative for cough and shortness of breath  Cardiovascular: Negative for chest pain and leg swelling  Gastrointestinal: Negative for abdominal pain, constipation and diarrhea  Genitourinary: Negative for dysuria and frequency  Musculoskeletal: Positive for arthralgias and gait problem  Negative for joint swelling and myalgias  Skin: Negative for rash and wound  Neurological: Negative for dizziness, weakness and headaches  Psychiatric/Behavioral: Negative for confusion  The patient is not nervous/anxious  Objective:      /78   Pulse 76   Temp 98 °F (36 7 °C) (Oral)   Resp 18   Ht 5' 4" (1 626 m)   Wt 104 kg (228 lb 6 4 oz)   SpO2 98%   BMI 39 20 kg/m²          Physical Exam   Constitutional: She is oriented to person, place, and time  She appears well-developed and well-nourished  HENT:   Head: Normocephalic and atraumatic  Nose: Nose normal    Eyes: Pupils are equal, round, and reactive to light     Cardiovascular: Normal rate, regular rhythm and normal heart sounds  Pulmonary/Chest: Effort normal and breath sounds normal  She has no wheezes  Abdominal: Soft  Bowel sounds are normal    Musculoskeletal: She exhibits no edema  Using cane   Neurological: She is alert and oriented to person, place, and time  Skin: Skin is warm  No rash noted  Psychiatric: She has a normal mood and affect  Her behavior is normal    Nursing note and vitals reviewed  Lab &/or imaging results reviewed with patient

## 2019-11-18 NOTE — ASSESSMENT & PLAN NOTE
Lab Results   Component Value Date    HGBA1C 5 5 11/11/2019     A1c within normal   Limit desserts, chocolate

## 2019-11-26 ENCOUNTER — TRANSCRIBE ORDERS (OUTPATIENT)
Dept: ADMINISTRATIVE | Facility: HOSPITAL | Age: 79
End: 2019-11-26

## 2019-11-26 ENCOUNTER — ANTICOAG VISIT (OUTPATIENT)
Dept: CARDIOLOGY CLINIC | Facility: CLINIC | Age: 79
End: 2019-11-26

## 2019-11-26 ENCOUNTER — APPOINTMENT (OUTPATIENT)
Dept: LAB | Facility: HOSPITAL | Age: 79
End: 2019-11-26
Payer: MEDICARE

## 2019-11-26 DIAGNOSIS — I48.91 ATRIAL FIBRILLATION, UNSPECIFIED TYPE (HCC): ICD-10-CM

## 2019-12-02 ENCOUNTER — CONSULT (OUTPATIENT)
Dept: BARIATRICS | Facility: CLINIC | Age: 79
End: 2019-12-02
Payer: MEDICARE

## 2019-12-02 VITALS
RESPIRATION RATE: 16 BRPM | DIASTOLIC BLOOD PRESSURE: 70 MMHG | BODY MASS INDEX: 39.13 KG/M2 | WEIGHT: 229.2 LBS | HEIGHT: 64 IN | TEMPERATURE: 97.5 F | SYSTOLIC BLOOD PRESSURE: 118 MMHG | HEART RATE: 94 BPM

## 2019-12-02 DIAGNOSIS — E11.51 TYPE 2 DIABETES MELLITUS WITH DIABETIC PERIPHERAL ANGIOPATHY WITHOUT GANGRENE, WITHOUT LONG-TERM CURRENT USE OF INSULIN (HCC): ICD-10-CM

## 2019-12-02 DIAGNOSIS — I10 ESSENTIAL HYPERTENSION: ICD-10-CM

## 2019-12-02 DIAGNOSIS — K21.9 GASTROESOPHAGEAL REFLUX DISEASE WITHOUT ESOPHAGITIS: ICD-10-CM

## 2019-12-02 DIAGNOSIS — I48.20 CHRONIC ATRIAL FIBRILLATION (HCC): ICD-10-CM

## 2019-12-02 DIAGNOSIS — E66.01 SEVERE OBESITY (BMI 35.0-39.9) WITH COMORBIDITY (HCC): Primary | ICD-10-CM

## 2019-12-02 PROCEDURE — 99204 OFFICE O/P NEW MOD 45 MIN: CPT | Performed by: PHYSICIAN ASSISTANT

## 2019-12-02 NOTE — ASSESSMENT & PLAN NOTE
-prior hx Carrero's, EGD since negative  -reports symptoms well controlled currently without medication  -advised avoidance or large meals, food triggers, supine position for 2-3 hours after a meal

## 2019-12-02 NOTE — PATIENT INSTRUCTIONS
Goals: Food log (ie ) www myfitnesspal com,sparkpeople  com,loseit com,calorieking  com,etc    No sugary beverages  At least 64oz of water daily  Increase physical activity by 10 minutes daily  Gradually increase physical activity to a goal of 5 days per week for 30 minutes of MODERATE intensity PLUS 2 days per week of FULL BODY resistance training - goal of 5 to 10 minutes of stationary bike per day  0945-6363 calories per day  5-10 servings of fruits and vegetables per day   Calorieking  com to look up calories

## 2019-12-02 NOTE — ASSESSMENT & PLAN NOTE
Lab Results   Component Value Date    HGBA1C 5 5 11/11/2019     HgbA1c well controlled through diet  -avoidance of refined carbohydrates and cardiovascular exercise as tolerated  -on gAbapentin for neuropathy (known weight elizabeth)

## 2019-12-02 NOTE — ASSESSMENT & PLAN NOTE
-Discussed options of [facility]-Intensive Lifestyle Intervention Program and Conservative Program and the role of weight loss medications  Not a candidate for VSG due to prior hx or Carrero's  Lamin-En-Y not discussed per patient request as she stated she is not interested in surgical interventions for weight loss  -Initial weight loss goal of 5-10% weight loss for improved health  -Screening labs: reviewed  -Patient is interested in pursuing conservative program + menu planning and partial meal replacement   Samples provided    Negative Stop-Bang 3/8

## 2019-12-02 NOTE — PROGRESS NOTES
Assessment/Plan:    Severe obesity (BMI 35 0-39  9) with comorbidity ([facility] [address] )  -Discussed options of [facility]-Intensive Lifestyle Intervention Program and Conservative Program and the role of weight loss medications  Not a candidate for VSG due to prior hx or Carrero's  Lamin-En-Y not discussed per patient request as she stated she is not interested in surgical interventions for weight loss  -Initial weight loss goal of 5-10% weight loss for improved health  -Screening labs: reviewed  -Patient is interested in pursuing conservative program + menu planning and partial meal replacement  Samples provided    Negative Stop-Bang 3/8    Type 2 diabetes mellitus with diabetic peripheral angiopathy without gangrene ([facility] )    Lab Results   Component Value Date    HGBA1C 5 5 11/11/2019     HgbA1c well controlled through diet  -avoidance of refined carbohydrates and cardiovascular exercise as tolerated  -on gAbapentin for neuropathy (known weight elizabeth)    Essential hypertension  -on Lisinopril and lopressor  -may improve with weight loss    GERD (gastroesophageal reflux disease)  -prior hx Carrero's, EGD since negative  -reports symptoms well controlled currently without medication  -advised avoidance or large meals, food triggers, supine position for 2-3 hours after a meal    Atrial fibrillation ([facility] ) [I48 [id number]]  - on warfarin, lopressor and lasix  -s/p PPM  -followed by cardiology    Goals:    Food log (ie ) www myfitnesspal com,sparkpeople  com,loseit com,calorieking  com,etc    No sugary beverages  At least 64oz of water daily  Increase physical activity by 10 minutes daily  Gradually increase physical activity to a goal of 5 days per week for 30 minutes of MODERATE intensity PLUS 2 days per week of FULL BODY resistance training - goal of 5 to 10 minutes of stationary bike per day  6726-4808 calories per day  5-10 servings of fruits and vegetables per day   Calorieking  com to look up calories     Follow up in approximately 6 weeks w/ PA-C  And 2 weeks with RD    Diagnoses and all orders for this visit:    Severe obesity (BMI 35 0-39  9) with comorbidity ([facility] [address] )  -     Ambulatory referral to Weight Management    Type 2 diabetes mellitus with diabetic peripheral angiopathy without gangrene, without long-term current use of insulin (HCC)    Essential hypertension    Gastroesophageal reflux disease without esophagitis    Chronic atrial fibrillation          Subjective:   Chief Complaint   Patient presents with    Consult     Patient is here for MWM consult BMI 39 3  Litzy Pritchett 3/8  Patient ID: Duane Hathaway  is a 70 y o  female with excess weight/obesity here to pursue weight managment      Past Medical History:   Diagnosis Date    Atrial fibrillation ([facility] [address] )     Carrero's esophagus     last assessed: 1/23/2018    Breast cancer (HCC)     stage 1 (left), given adjuvant radiation with Arimidex x 5 years    Cancer [facility])     Left Breast, Lumpectomy    Cataract     last assessed: 3/11/2014    Dysplasia of toenail     last assessed: 8/29/2017    Esophageal reflux     GERD (gastroesophageal reflux disease)     Gross hematuria     last assessed: 2/19/2015    Hematuria     Hiatal hernia     History of radiation therapy     Hypertension     Irregular heart beat     AFIB    Mixed sensory-motor polyneuropathy     Neuropathy     Obesity     Pacemaker     Paroxysmal atrial fibrillation (HCC)     Peripheral neuropathy     Rectal bleeding     Restless leg syndrome     Shortness of breath     last assessed: 1/11/2016         HPI:  Obesity/Excess Weight:  Severity: Severe  Onset:  Past 25 years  Modifiers: meal planning, weight watchers  Contributing factors: 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, working long hours wiht convenient meal choices, reports poor self control  Associated symptoms: knee pain, physical limitations, emotionally does not like being at current weight    Goals: 199 lbs     B: jett and eggs + toast OR  rye toast + butter + coffee  S: varies, whatever is around  L: turkey sandwich + [place]+ mustard + chips   S: varies  D: chicken noodle soup or pasta fagioli OR protein + salad + nonstarchy veggie  S: popsicle OR cookies    Hydration: 36oz water, 2 cups of coffee + half and half  Exercise: none    Colonoscopy: completed 2/2017, f/u recommended in 3 years  Reminded patient of f/u due in 2/2020    The following portions of the patient's history were reviewed and updated as appropriate: allergies, current medications, past family history, past medical history, past social history, past surgical history and problem list     Review of Systems   Constitutional: Negative for chills and fatigue  Respiratory: Positive for shortness of breath (with exertion)  Negative for cough  Cardiovascular: Negative for chest pain and palpitations  Gastrointestinal: Negative for abdominal pain, constipation, diarrhea and vomiting  Genitourinary: Negative for dysuria  Musculoskeletal: Positive for arthralgias  Skin: Negative for rash  Neurological: Negative for dizziness and headaches  Psychiatric/Behavioral: The patient is not nervous/anxious  Objective:    /70 (BP Location: Right arm, Patient Position: Sitting, Cuff Size: Large)   Pulse 94   Temp 97 5 °F (36 4 °C) (Tympanic)   Resp 16   Ht 5' 4" (1 626 m)   Wt 104 kg (229 lb 3 2 oz)   BMI 39 34 kg/m²     Physical Exam   Nursing note and vitals reviewed  Constitutional   General appearance: Abnormal   well developed and obese  Eyes No conjunctival pallor  Ears, Nose, Mouth, and Throat Oral mucosa moist    Pulmonary   Respiratory effort: No increased work of breathing or signs of respiratory distress  Auscultation of lungs: Clear to auscultation, equal breath sounds bilaterally, no wheezes, no rales, no rhonci      Cardiovascular   Auscultation of heart: irregular rhythm, 2/6 murmur  Examination of extremities for edema and/or varicosities: trace edema bilaterally  Abdomen   Abdomen: Abnormal   The abdomen was obese  Bowel sounds were normal  The abdomen was soft and nontender     Musculoskeletal   Gait and station: Normal     Psychiatric   Orientation to person, place and time: Normal     Affect: appropriate

## 2019-12-04 DIAGNOSIS — I48.91 ATRIAL FIBRILLATION, UNSPECIFIED TYPE (HCC): ICD-10-CM

## 2019-12-04 RX ORDER — FUROSEMIDE 40 MG/1
TABLET ORAL
Qty: 90 TABLET | Refills: 0 | Status: SHIPPED | OUTPATIENT
Start: 2019-12-04 | End: 2020-03-13 | Stop reason: SDUPTHER

## 2019-12-11 ENCOUNTER — APPOINTMENT (OUTPATIENT)
Dept: LAB | Facility: HOSPITAL | Age: 79
End: 2019-12-11
Payer: MEDICARE

## 2019-12-11 ENCOUNTER — ANTICOAG VISIT (OUTPATIENT)
Dept: CARDIOLOGY CLINIC | Facility: CLINIC | Age: 79
End: 2019-12-11

## 2019-12-11 ENCOUNTER — OFFICE VISIT (OUTPATIENT)
Dept: PODIATRY | Facility: CLINIC | Age: 79
End: 2019-12-11
Payer: MEDICARE

## 2019-12-11 VITALS
DIASTOLIC BLOOD PRESSURE: 80 MMHG | HEART RATE: 92 BPM | RESPIRATION RATE: 16 BRPM | SYSTOLIC BLOOD PRESSURE: 132 MMHG | BODY MASS INDEX: 39.09 KG/M2 | HEIGHT: 64 IN | WEIGHT: 229 LBS

## 2019-12-11 DIAGNOSIS — L84 CORNS: ICD-10-CM

## 2019-12-11 DIAGNOSIS — M79.672 PAIN IN BOTH FEET: ICD-10-CM

## 2019-12-11 DIAGNOSIS — I70.209 PERIPHERAL ARTERIOSCLEROSIS (HCC): ICD-10-CM

## 2019-12-11 DIAGNOSIS — M79.671 PAIN IN BOTH FEET: ICD-10-CM

## 2019-12-11 DIAGNOSIS — I48.20 CHRONIC ATRIAL FIBRILLATION (HCC): ICD-10-CM

## 2019-12-11 DIAGNOSIS — I48.91 ATRIAL FIBRILLATION, UNSPECIFIED TYPE (HCC): ICD-10-CM

## 2019-12-11 DIAGNOSIS — E11.51 TYPE 2 DIABETES MELLITUS WITH DIABETIC PERIPHERAL ANGIOPATHY WITHOUT GANGRENE, WITHOUT LONG-TERM CURRENT USE OF INSULIN (HCC): Primary | ICD-10-CM

## 2019-12-11 LAB
INR PPP: 1.96 (ref 0.91–1.09)
PROTHROMBIN TIME: 20.7 SECONDS (ref 9.8–12)

## 2019-12-11 PROCEDURE — 36415 COLL VENOUS BLD VENIPUNCTURE: CPT

## 2019-12-11 PROCEDURE — 11056 PARNG/CUTG B9 HYPRKR LES 2-4: CPT | Performed by: PODIATRIST

## 2019-12-11 PROCEDURE — 85610 PROTHROMBIN TIME: CPT

## 2019-12-11 NOTE — PROGRESS NOTES
Assessment/Plan:  Painful calluses   Diabetic neuropathy   Peripheral vascular disease   Chronic edema  Plan   Diabetic foot exam performed   All mycotic nails debrided   Plantar calluses debrided without pain or complication  Diagnoses and all orders for this visit:     Diabetic polyneuropathy associated with type 2 diabetes mellitus (HCC)     Corns     Pain in both feet     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[address] )     Chronic edema   Rule out deep venous insufficiency       Discussion/Summary  The patient was counseled regarding instructions for management,-- prognosis,-- patient and family education,-- risks and benefits of treatment options,-- importance of compliance with treatment  Patient is able to Self-Care  Possible side effects of new medications were reviewed with the patient/guardian today  The treatment plan was reviewed with the patient/guardian  The patient/guardian understands and agrees with the treatment plan      Chief Complaint  Routine nail care      History of Present Illness  HPI: Patient is doing well with Cymbalta however she began have facial tingling  She discontinued medicine   However the medication was relieving her neuropathic pain       Review of Systems     ROS reviewed       Active Problems     1  Achilles tendinitis of left lower extremity (726 71) (M76 62)   2  Acquired ankle/foot deformity (736 70) (M21 969)   3  AF (paroxysmal atrial fibrillation) (427 31) (I48 0)   4  Anticoagulant long-term use (V58 61) (Z79 01)   5  Arthritis (716 90) (M19 90)   6  At risk for bone density loss (V49 89) (Z91 89)   7  Atrial fibrillation (427 31) (I48 91)   8  History of Breast Cancer (V10 3)   9  Difficulty walking (719 7) (R26 2)   10  Encounter for screening mammogram for breast cancer (V76 12) (Z12 31)   11  Esophageal reflux (530 81) (K21 9)   12  Foot pain, bilateral (729 5) (M79 671,M79 672)   13  Hiatal hernia (553 3) (K44 9)   14  History of Carrero's esophagus (V12 79) (Z87 19)   15  History of fall (V15 88) (Z91 81)   16  Hypertension (401 9) (I10)   17  Leg pain, left (729 5) (S87 591)   18  Lichen sclerosus et atrophicus (701 0) (L90 0)   19  Lumbar radiculopathy (724 4) (M54 16)   20  Multiple lung nodules (793 19) (R91 8)   21  Obesity (278 00) (E66 9)   22  Onychomycosis (110 1) (B35 1)   23  Osteopenia (733 90) (M85 80)   24  Pacemaker (V45 01) (Z95 0)   25  Peripheral neuropathy (356 9) (G62 9)   26  Pes planus of both feet (734) (M21 41,M21 42)   27  Plantar fasciitis of left foot (728 71) (M72 2)   28  Restless legs syndrome (333 94) (G25 81)     Past Medical History   · History of Abnormal glucose (790 29) (R73 09)   · Atrial fibrillation (427 31) (I48 91)   · History of Breast Cancer (V10 3)   · 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)   · Esophageal reflux (530 81) (K21 9)   · Denied: History of Exposure To STD   · History of Gross hematuria (599 71) (R31 0)   · History of Hematoma (924 9) (T14 8XXA)   · History of Hematoma of lower extremity, right, initial encounter (924 5) (S80 11XA)   · History of backache (V13 59) (Z87 39)   · History of Carrero's esophagus (V12 79) (Z87 19)   · History of cataract (V12 49) (Z86 69)   · History of chest pain (V13 89) (Z87 898)   · History of fall (V15 88) (Z91 81)   · History of hematuria (V13 09) (Z87 448)   · History of postmenopausal hormone replacement therapy (V87 49) (Z92 29)   · History of shortness of breath (V13 89) (O35 622)   · History of urinary incontinence (V13 09) (Z87 898)   · History of Neck pain (723 1) (M54 2)   · Normal delivery (650) (O80,Z37  9)   · History of Right knee pain (719 46) (M25 561)   · History of Ringing In The Ears (Tinnitus) (388 30)   · History of Skin rash (782 1) (R21)   · History of Traumatic blister of right lower extremity, initial encounter (916 2) (W07 664D)   · History of UTI (lower urinary tract infection) (599 0) (N39 0)     The active problems and past medical history were reviewed and updated today       Surgical History   · Denied: History of Abnormal Pap Smear Of Cervix   · History of Breast Surgery Lumpectomy   · History of Cataract Surgery   · History of Diagnostic Cystoscopy   · History of Excision Lesion Of Meniscus Or Capsule Knee   · History of Pacemaker - Pulse Generator Replacement   · History of Pacemaker Placement   · History of Pacemaker Placement   · History of Radiation Therapy   · History of Total Abdominal Hysterectomy With Removal Of Both Ovaries     The surgical history was reviewed and updated today        Family History  Mother    · Denied: Family history of Alcoholism and drug addiction in family   · Denied: Family history of Anxiety and depression  Father    · Denied: Family history of Alcoholism and drug addiction in family   · Denied: Family history of Anxiety and depression   · Family history of Coronary Artery Disease (V17 49)   · Family history of abdominal aortic aneurysm (V17 49) (Z82 49)  Child    · Denied: Family history of Alcoholism and drug addiction in family   · Denied: Family history of Anxiety and depression  Sibling    · Denied: Family history of Alcoholism and drug addiction in family   · Denied: Family history of Anxiety and depression  Sister    · Family history of Breast Cancer (V16 3)  Maternal Aunt    · Family history of Colon Cancer (V16 0)   · Family history of Colon Cancer (V16 0)  Family History    · Denied: Family history of Diabetes Mellitus   · Denied: Family history of Stroke Syndrome     The family history was reviewed and updated today        Social History      · Being A Social Drinker   · Denied: History of Drug Use   · Former smoker (V15 82) (U26 228)   · 25 pack years, quit age 41   · Marital History - Currently    · Retired From Work   · 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  The social history was reviewed and updated today       The medication list was reviewed and updated today        Allergies  1  duloxetine   2  LevoFLOXacin TABS   3  Cephalexin CAPS   4  Erythromycin Base TABS   5  Keflex TABS   6  Penicillins   7  Sulfa Drugs        Physical Exam  Left Foot: Appearance: Normal except as noted: excessive pronation-- and-- pes planus  Tenderness: None except the lisfranc joint-- and-- medial longitudinal arch  ROM: subtalar motion was restricted  Right Foot: Appearance: Normal except as noted: excessive pronation-- and-- pes planus  Tenderness: None except the lisfranc joint-- and-- medial longitudinal arch  ROM: subtalar motion was restricted   Left Ankle: ROM: limited ROM in all planes   Right Ankle: ROM: limited ROM in all planes   Neurological Exam: performed  Light touch was decreased bilaterally  Vibratory sensation was decreased in both first metatarsophalangeal joints  Deep tendon reflexes: achilles reflex absent bilateraly-- and-- 4/5 L5 testing bilateral    Vascular Exam: performed Dorsalis pedis pulses were diminished bilaterally  Posterior tibial pulses were diminished bilaterally  Dependence rubor was present bilaterally  Capillary refill time was Negative digital hair noted, but-- between 1-3 seconds bilaterally  Toenails: All of the toenails were elongated,-- hypertrophied,-- discolored-- and--   Hyperkeratosis: present on both first toes  Shoe Gear Evaluation: performed ()  Recommendation(s): SAS style       Patient's shoes and socks removed  Right Foot/Ankle   Right Foot Inspection        Sensory   Vibration: diminished  Proprioception: diminished      Vascular  Capillary refills: elevated        Left Foot/Ankle  Left Foot Inspection                    Sensory   Vibration: diminished  Proprioception: diminished     Vascular  Capillary refills: elevated     Assign Risk Category:  Deformity present; Loss of protective sensation; Weak pulses       Risk: 2

## 2019-12-12 NOTE — PROGRESS NOTES
Weight Management Medical Nutrition Assessment  Jean Carlos Orellana presented for meal planning  Today's weight is 226 7#  She is consuming excess calories from breads, crackers, cookies  She reports baking a lot right now and eating several cookies daily  She states always being busy babysitting her grandchildren and doesn't focus on herself  She reports never being thirsty and not drinking much fluids throughout the day  Stressed the importance of cutting portions, healthier options and food journaling for weight loss  Encouraged her to start using her stationary bikes (even 10 mins/day)  Anthropometric Measurements  Start Weight (#): 226 7#  Current Weight (#): n/a  TBW % Change from start weight: n/a  Ideal Body Weight (#): 121#  Goal Weight (#):199#    Weight Loss History  Previous weight loss attempts: Commercial Programs ([employer], Mariann Murillo, etc )    Food and Nutrition Related History  Wake up: varies  Bed Time: varies    Food Recall  Breakfast: (7am) toast with butter and coffee with half n half    Snack: (9am): 1 egg and 1/2 roll  Lunch: (12-1 pm): sandwich (4 ozchicken or ham/turkey lunch meat) or bowl of soup (chicken soup, pasta, lentil soup) with crackers; water or coffee  Snack: time varies: crackers, pretzels or cookies  Dinner: (6pm): meatloaf or steak or roasted chicken with salad and a vegetable (meat+salad+starch or non starch veggies); water  Snack: (9pm): crackers, cookies      Beverages: water or coffee  Volume of beverage intake: 3 glasses water and 2 cups coffee    Weekends: Same  Cravings: chocolate  Trouble area of day: at nighttime and between lunch and dinner    Frequency of Eating out: irregularly  Food restrictions: none  Cooking: self   Food Shopping: self    Physical Activity Intake  Activity :none  Frequency:never  Physical limitations/barriers to exercise: knees    Estimated Needs  Energy  B[employer] [place] Energy Needs:  BMR : 1646   1-2# loss weekly sedentary:  616-0922 1-2# loss weekly lightly active:1776-6381  Protein: 66-83 grams      (1 2-1 5g/kg IBW)  Fluid: 1925 ml     (35mL/kg IBW) 64 oz    Nutrition Diagnosis  Yes; Overweight/obesity  related to Excess energy intake as evidenced by  BMI more than normative standard for age and sex (obesity-grade II 35-39  9)       Nutrition Intervention    Nutrition Prescription  Calories: 3459-6266  Protein: 70-85 grams  Fluid: 1925 ml    Meal Plan (Bandar/Pro/Carb)  Breakfast: 300/20  Snack:  Lunch: 300-350/20-30  Snack: 100/5-10  Dinner: 300/37  Snack: 200/5-15    Nutrition Education:    Healthy Core Manual  Calorie controlled menu  Lean protein food choices  Healthy snack options  Food journaling tips      Nutrition Counseling:  Strategies: meal planning, portion sizes, healthy snack choices, hydration, fiber intake, protein intake, exercise, food journal      Monitoring and Evaluation:  Evaluation criteria:  Energy Intake  Meet protein needs  Maintain adequate hydration  Monitor weekly weight  Meal planning/preparation  Food journal   Decreased portions at mealtimes and snacks  Physical activity     Barriers to learning:none  Readiness to change: Contemplation:  (Acknowledging that there is a problem but not yet ready or sure of wanting to make a change)  Comprehension: good  Expected Compliance: fair

## 2019-12-16 ENCOUNTER — OFFICE VISIT (OUTPATIENT)
Dept: BARIATRICS | Facility: CLINIC | Age: 79
End: 2019-12-16

## 2019-12-16 VITALS — HEIGHT: 64 IN | WEIGHT: 226.7 LBS | BODY MASS INDEX: 38.7 KG/M2

## 2019-12-16 DIAGNOSIS — R63.5 ABNORMAL WEIGHT GAIN: ICD-10-CM

## 2019-12-16 PROCEDURE — RECHECK

## 2019-12-16 PROCEDURE — WMDI30

## 2019-12-23 ENCOUNTER — ANTICOAG VISIT (OUTPATIENT)
Dept: CARDIOLOGY CLINIC | Facility: CLINIC | Age: 79
End: 2019-12-23

## 2019-12-23 ENCOUNTER — APPOINTMENT (OUTPATIENT)
Dept: LAB | Facility: HOSPITAL | Age: 79
End: 2019-12-23
Payer: MEDICARE

## 2019-12-23 DIAGNOSIS — I48.20 CHRONIC ATRIAL FIBRILLATION (HCC): ICD-10-CM

## 2020-01-13 ENCOUNTER — APPOINTMENT (OUTPATIENT)
Dept: LAB | Facility: HOSPITAL | Age: 80
End: 2020-01-13
Payer: MEDICARE

## 2020-01-13 ENCOUNTER — TRANSCRIBE ORDERS (OUTPATIENT)
Dept: ADMINISTRATIVE | Facility: HOSPITAL | Age: 80
End: 2020-01-13

## 2020-01-13 ENCOUNTER — ANTICOAG VISIT (OUTPATIENT)
Dept: CARDIOLOGY CLINIC | Facility: CLINIC | Age: 80
End: 2020-01-13

## 2020-01-13 DIAGNOSIS — I48.20 CHRONIC ATRIAL FIBRILLATION (HCC): ICD-10-CM

## 2020-01-13 DIAGNOSIS — I48.91 ATRIAL FIBRILLATION, UNSPECIFIED TYPE (HCC): ICD-10-CM

## 2020-01-13 LAB
INR PPP: 2.33 (ref 0.91–1.09)
PROTHROMBIN TIME: 24.8 SECONDS (ref 9.8–12)

## 2020-01-13 PROCEDURE — 85610 PROTHROMBIN TIME: CPT

## 2020-01-13 PROCEDURE — 36415 COLL VENOUS BLD VENIPUNCTURE: CPT

## 2020-01-16 ENCOUNTER — REMOTE DEVICE CLINIC VISIT (OUTPATIENT)
Dept: CARDIOLOGY CLINIC | Facility: CLINIC | Age: 80
End: 2020-01-16
Payer: MEDICARE

## 2020-01-16 DIAGNOSIS — Z95.0 CARDIAC PACEMAKER IN SITU: Primary | ICD-10-CM

## 2020-01-16 PROCEDURE — 93294 REM INTERROG EVL PM/LDLS PM: CPT | Performed by: INTERNAL MEDICINE

## 2020-01-16 PROCEDURE — 93296 REM INTERROG EVL PM/IDS: CPT | Performed by: INTERNAL MEDICINE

## 2020-01-16 NOTE — PROGRESS NOTES
Results for orders placed or performed in visit on 01/16/20   Cardiac EP device report    Narrative    MDT-SINGLE-PM/ NOT MRI CONDITIONAL  CARELINK TRANSMISSION: BATTERY VOLTAGE ADEQUATE (3 YRS)  -24%  ALL AVAILABLE LEAD PARAMETERS WITHIN NORMAL LIMITS  [facility] 12 MINS (NO EGM FOR THIS EPISODE)- RVR ON AVAILABLE EGM'S BUT CANNOT EXCLUDE ANY NSVT  HX OF SAME  EF-60% (ECHO 11/28/18)  HX: CHRONIC AF & ON WARFARIN & METOPROLOL  NORMAL DEVICE FUNCTION   GV

## 2020-02-04 ENCOUNTER — OFFICE VISIT (OUTPATIENT)
Dept: BARIATRICS | Facility: CLINIC | Age: 80
End: 2020-02-04
Payer: MEDICARE

## 2020-02-04 VITALS
BODY MASS INDEX: 38.72 KG/M2 | TEMPERATURE: 96.8 F | RESPIRATION RATE: 16 BRPM | WEIGHT: 226.8 LBS | SYSTOLIC BLOOD PRESSURE: 130 MMHG | HEIGHT: 64 IN | HEART RATE: 84 BPM | DIASTOLIC BLOOD PRESSURE: 78 MMHG

## 2020-02-04 DIAGNOSIS — E11.51 TYPE 2 DIABETES MELLITUS WITH DIABETIC PERIPHERAL ANGIOPATHY WITHOUT GANGRENE, WITHOUT LONG-TERM CURRENT USE OF INSULIN (HCC): ICD-10-CM

## 2020-02-04 DIAGNOSIS — E66.01 SEVERE OBESITY (BMI 35.0-39.9) WITH COMORBIDITY (HCC): Primary | ICD-10-CM

## 2020-02-04 DIAGNOSIS — I10 ESSENTIAL HYPERTENSION: ICD-10-CM

## 2020-02-04 PROCEDURE — 1036F TOBACCO NON-USER: CPT | Performed by: PHYSICIAN ASSISTANT

## 2020-02-04 PROCEDURE — 3075F SYST BP GE 130 - 139MM HG: CPT | Performed by: PHYSICIAN ASSISTANT

## 2020-02-04 PROCEDURE — 99214 OFFICE O/P EST MOD 30 MIN: CPT | Performed by: PHYSICIAN ASSISTANT

## 2020-02-04 PROCEDURE — 4040F PNEUMOC VAC/ADMIN/RCVD: CPT | Performed by: PHYSICIAN ASSISTANT

## 2020-02-04 PROCEDURE — 3008F BODY MASS INDEX DOCD: CPT | Performed by: PHYSICIAN ASSISTANT

## 2020-02-04 PROCEDURE — 3078F DIAST BP <80 MM HG: CPT | Performed by: PHYSICIAN ASSISTANT

## 2020-02-04 PROCEDURE — 1160F RVW MEDS BY RX/DR IN RCRD: CPT | Performed by: PHYSICIAN ASSISTANT

## 2020-02-04 PROCEDURE — 2022F DILAT RTA XM EVC RTNOPTHY: CPT | Performed by: PHYSICIAN ASSISTANT

## 2020-02-04 NOTE — ASSESSMENT & PLAN NOTE
-Patient is pursuing Conservative Program  -Initial weight loss goal of 5-10% weight loss for improved health  -patient reports she struggled around holidays and knows she gained weight but is not sure how much   Just started food logging this past week and reports it has helped make her more aware of her choices  -reviewed appropriate portion sizes and importance of measuring  -discussed carbohydrate and protein goals    Initial: 229 2 lbs  Current: 226 8 lbs  Change: -2 4 lbs  Goal: 199 lbs

## 2020-02-04 NOTE — PROGRESS NOTES
Assessment/Plan:    Severe obesity (BMI 35 0-39  9) with comorbidity ([facility] [address] )  -Patient is pursuing Conservative Program  -Initial weight loss goal of 5-10% weight loss for improved health  -patient reports she struggled around holidays and knows she gained weight but is not sure how much  Just started food logging this past week and reports it has helped make her more aware of her choices  -reviewed appropriate portion sizes and importance of measuring  -discussed carbohydrate and protein goals    Initial: 229 2 lbs  Current: 226 8 lbs  Change: -2 4 lbs  Goal: 199 lbs    Essential hypertension  -on Lisinopril and lopressor  -may improve with weight loss    Type 2 diabetes mellitus with diabetic peripheral angiopathy without gangrene [facility]    Lab Results   Component Value Date    HGBA1C 5 5 11/11/2019     HgbA1c well controlled through diet  -avoidance of refined carbohydrates and cardiovascular exercise as tolerated  -on gAbapentin for neuropathy (known weight elizabeth)  -reviewed carbohydrate recommendations     Goals:    Food log (ie ) www myfitnesspal com,sparkpeople  com,loseit com,calorieking  com,etc    No sugary beverages  At least 64oz of water daily  Increase physical activity by 10 minutes daily  Gradually increase physical activity to a goal of 5 days per week for 30 minutes of MODERATE intensity PLUS 2 days per week of FULL BODY resistance training  8990-7495 calories per day  30 grams of carbs per meal and no more than 15 grams of carb per snack  65 grams of protein per day   Calorieking  com  Serving size 3-4 oz of lean protein, 1 cup of nonstarchy veggies and 1/2 cup of starch   Recommend exercise bike 5 min/ day and work up to 10 minutes per day      Follow up in approximately 6 weeks with Non-Surgical Physician/Advanced Practitioner  25 minute visit, >50% face-to-face time spent counseling patient on diet behavior and exercise modification for weight loss       Diagnoses and all orders for this visit:    Severe obesity (BMI 35 0-39  9) with comorbidity ([address] )    Essential hypertension    Type 2 diabetes mellitus with diabetic peripheral angiopathy without gangrene, without long-term current use of insulin (HCC)          Subjective:   Chief Complaint   Patient presents with    Follow-up     Patient is here for an MWM follow up        Patient ID: Arthur Gudino  is a 70 y o  female with excess weight/obesity here to pursue weight managment  Patient is pursuing Conservative Program      HPI Patient presents for MWM follow up  Reports she is feeling more motivated to lose weight  She started food journaling one week ago  Food logging: reports she is food journaling for the past week but was not previously  Increased appetite/cravings: denies  Fruit/Vegetable servings: 2-3  Exercise:  Sporadically on exercise bike for 5-10 minutes  Hydration: 36 oz of water, 1-2 cups of coffee + low fat half and half    Colonoscopy: as per 12/119 note - completed 2/2017, f/u recommended in 3 years  Reminded patient of f/u due in 2/2020    The following portions of the patient's history were reviewed and updated as appropriate: allergies, current medications, past family history, past medical history, past social history, past surgical history and problem list     Review of Systems   Respiratory: Positive for shortness of breath (with exertion)  Negative for cough  Cardiovascular: Negative for chest pain and palpitations  Gastrointestinal: Negative for abdominal pain  Psychiatric/Behavioral:        Reports mood stable       Objective:    /78 (BP Location: Left arm, Patient Position: Sitting, Cuff Size: Large)   Pulse 84   Temp (!) 96 8 °F (36 °C) (Tympanic)   Resp 16   Ht 5' 4" (1 626 m)   Wt 103 kg (226 lb 12 8 oz)   BMI 38 93 kg/m²      Physical Exam   Constitutional: She is oriented to person, place, and time  She appears well-developed  HENT:   Head: Normocephalic     Pulmonary/Chest: Effort normal  Neurological: She is alert and oriented to person, place, and time  Psychiatric: She has a normal mood and affect  Her behavior is normal  Judgment and thought content normal    Nursing note and vitals reviewed

## 2020-02-04 NOTE — PATIENT INSTRUCTIONS
Goals: Food log (ie ) www myfitnesspal com,sparkpeople  com,loseit com,calorieking  com,etc    No sugary beverages  At least 64oz of water daily  Increase physical activity by 10 minutes daily  9998-0702 calories per day  30 grams of carbs per meal and no more than 15 grams of carb per snack  65 grams of protein per day   Calorieking  com  Serving size 3-4 oz of lean protein, 1 cup of nonstarchy veggies and 1/2 cup of starch   Recommend exercise bike 5 min/ day and work up to 10 minutes per day

## 2020-02-05 ENCOUNTER — TRANSCRIBE ORDERS (OUTPATIENT)
Dept: ADMINISTRATIVE | Facility: HOSPITAL | Age: 80
End: 2020-02-05

## 2020-02-05 DIAGNOSIS — Z12.31 ENCOUNTER FOR SCREENING MAMMOGRAM FOR MALIGNANT NEOPLASM OF BREAST: Primary | ICD-10-CM

## 2020-02-05 NOTE — ASSESSMENT & PLAN NOTE
Lab Results   Component Value Date    HGBA1C 5 5 11/11/2019     HgbA1c well controlled through diet  -avoidance of refined carbohydrates and cardiovascular exercise as tolerated  -on gAbapentin for neuropathy (known weight elizabeth)  -reviewed carbohydrate recommendations

## 2020-02-12 ENCOUNTER — ANTICOAG VISIT (OUTPATIENT)
Dept: CARDIOLOGY CLINIC | Facility: CLINIC | Age: 80
End: 2020-02-12

## 2020-02-12 ENCOUNTER — OFFICE VISIT (OUTPATIENT)
Dept: PODIATRY | Facility: CLINIC | Age: 80
End: 2020-02-12
Payer: MEDICARE

## 2020-02-12 ENCOUNTER — APPOINTMENT (OUTPATIENT)
Dept: LAB | Facility: HOSPITAL | Age: 80
End: 2020-02-12
Payer: MEDICARE

## 2020-02-12 VITALS — WEIGHT: 226 LBS | HEIGHT: 64 IN | BODY MASS INDEX: 38.58 KG/M2 | RESPIRATION RATE: 16 BRPM

## 2020-02-12 DIAGNOSIS — E11.51 TYPE 2 DIABETES MELLITUS WITH DIABETIC PERIPHERAL ANGIOPATHY WITHOUT GANGRENE, WITHOUT LONG-TERM CURRENT USE OF INSULIN (HCC): Primary | ICD-10-CM

## 2020-02-12 DIAGNOSIS — I70.209 PERIPHERAL ARTERIOSCLEROSIS (HCC): ICD-10-CM

## 2020-02-12 DIAGNOSIS — M79.672 PAIN IN BOTH FEET: ICD-10-CM

## 2020-02-12 DIAGNOSIS — M79.671 PAIN IN BOTH FEET: ICD-10-CM

## 2020-02-12 DIAGNOSIS — I48.20 CHRONIC ATRIAL FIBRILLATION (HCC): ICD-10-CM

## 2020-02-12 DIAGNOSIS — L84 CORNS: ICD-10-CM

## 2020-02-12 PROCEDURE — 11056 PARNG/CUTG B9 HYPRKR LES 2-4: CPT | Performed by: PODIATRIST

## 2020-02-12 NOTE — PROGRESS NOTES
Assessment/Plan:  Painful calluses   Diabetic neuropathy   Peripheral vascular disease   Chronic edema  Plan   Diabetic foot exam performed   All mycotic nails debrided   Plantar calluses debrided without pain or complication  Diagnoses and all orders for this visit:     Diabetic polyneuropathy associated with type 2 diabetes mellitus (HCC)     Corns     Pain in both feet     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[address] )     Chronic edema   Rule out deep venous insufficiency       Discussion/Summary  The patient was counseled regarding instructions for management,-- prognosis,-- patient and family education,-- risks and benefits of treatment options,-- importance of compliance with treatment  Patient is able to Self-Care  Possible side effects of new medications were reviewed with the patient/guardian today  The treatment plan was reviewed with the patient/guardian  The patient/guardian understands and agrees with the treatment plan      Chief Complaint  Routine nail care      History of Present Illness  HPI: Patient is doing well with Cymbalta however she began have facial tingling  She discontinued medicine   However the medication was relieving her neuropathic pain       Review of Systems     ROS reviewed       Active Problems     1  Achilles tendinitis of left lower extremity (726 71) (M76 62)   2  Acquired ankle/foot deformity (736 70) (M21 969)   3  AF (paroxysmal atrial fibrillation) (427 31) (I48 0)   4  Anticoagulant long-term use (V58 61) (Z79 01)   5  Arthritis (716 90) (M19 90)   6  At risk for bone density loss (V49 89) (Z91 89)   7  Atrial fibrillation (427 31) (I48 91)   8  History of Breast Cancer (V10 3)   9  Difficulty walking (719 7) (R26 2)   10  Encounter for screening mammogram for breast cancer (V76 12) (Z12 31)   11  Esophageal reflux (530 81) (K21 9)   12  Foot pain, bilateral (729 5) (M79 671,M79 672)   13  Hiatal hernia (553 3) (K44 9)   14  History of Carrero's esophagus (V12 79) (Z87 19)   15  History of fall (V15 88) (Z91 81)   16  Hypertension (401 9) (I10)   17  Leg pain, left (729 5) (Q94 913)   18  Lichen sclerosus et atrophicus (701 0) (L90 0)   19  Lumbar radiculopathy (724 4) (M54 16)   20  Multiple lung nodules (793 19) (R91 8)   21  Obesity (278 00) (E66 9)   22  Onychomycosis (110 1) (B35 1)   23  Osteopenia (733 90) (M85 80)   24  Pacemaker (V45 01) (Z95 0)   25  Peripheral neuropathy (356 9) (G62 9)   26  Pes planus of both feet (734) (M21 41,M21 42)   27  Plantar fasciitis of left foot (728 71) (M72 2)   28  Restless legs syndrome (333 94) (G25 81)     Past Medical History   · History of Abnormal glucose (790 29) (R73 09)   · Atrial fibrillation (427 31) (I48 91)   · History of Breast Cancer (V10 3)   · 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)   · Esophageal reflux (530 81) (K21 9)   · Denied: History of Exposure To STD   · History of Gross hematuria (599 71) (R31 0)   · History of Hematoma (924 9) (T14 8XXA)   · History of Hematoma of lower extremity, right, initial encounter (924 5) (S80 11XA)   · History of backache (V13 59) (Z87 39)   · History of Carrero's esophagus (V12 79) (Z87 19)   · History of cataract (V12 49) (Z86 69)   · History of chest pain (V13 89) (Z87 898)   · History of fall (V15 88) (Z91 81)   · History of hematuria (V13 09) (Z87 448)   · History of postmenopausal hormone replacement therapy (V87 49) (Z92 29)   · History of shortness of breath (V13 89) (Y56 527)   · History of urinary incontinence (V13 09) (Z87 898)   · History of Neck pain (723 1) (M54 2)   · Normal delivery (650) (O80,Z37  9)   · History of Right knee pain (719 46) (M25 561)   · History of Ringing In The Ears (Tinnitus) (388 30)   · History of Skin rash (782 1) (R21)   · History of Traumatic blister of right lower extremity, initial encounter (916 2) (V24 078S)   · History of UTI (lower urinary tract infection) (599 0) (N39 0)     The active problems and past medical history were reviewed and updated today       Surgical History   · Denied: History of Abnormal Pap Smear Of Cervix   · History of Breast Surgery Lumpectomy   · History of Cataract Surgery   · History of Diagnostic Cystoscopy   · History of Excision Lesion Of Meniscus Or Capsule Knee   · History of Pacemaker - Pulse Generator Replacement   · History of Pacemaker Placement   · History of Pacemaker Placement   · History of Radiation Therapy   · History of Total Abdominal Hysterectomy With Removal Of Both Ovaries     The surgical history was reviewed and updated today        Family History  Mother    · Denied: Family history of Alcoholism and drug addiction in family   · Denied: Family history of Anxiety and depression  Father    · Denied: Family history of Alcoholism and drug addiction in family   · Denied: Family history of Anxiety and depression   · Family history of Coronary Artery Disease (V17 49)   · Family history of abdominal aortic aneurysm (V17 49) (Z82 49)  Child    · Denied: Family history of Alcoholism and drug addiction in family   · Denied: Family history of Anxiety and depression  Sibling    · Denied: Family history of Alcoholism and drug addiction in family   · Denied: Family history of Anxiety and depression  Sister    · Family history of Breast Cancer (V16 3)  Maternal Aunt    · Family history of Colon Cancer (V16 0)   · Family history of Colon Cancer (V16 0)  Family History    · Denied: Family history of Diabetes Mellitus   · Denied: Family history of Stroke Syndrome     The family history was reviewed and updated today        Social History      · Being A Social Drinker   · Denied: History of Drug Use   · Former smoker (V15 82) (H70 062)   · 25 pack years, quit age 41   · Marital History - Currently    · Retired From Work   · 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  The social history was reviewed and updated today       The medication list was reviewed and updated today        Allergies  1  duloxetine   2  LevoFLOXacin TABS   3  Cephalexin CAPS   4  Erythromycin Base TABS   5  Keflex TABS   6  Penicillins   7  Sulfa Drugs        Physical Exam  Left Foot: Appearance: Normal except as noted: excessive pronation-- and-- pes planus  Tenderness: None except the lisfranc joint-- and-- medial longitudinal arch  ROM: subtalar motion was restricted  Right Foot: Appearance: Normal except as noted: excessive pronation-- and-- pes planus  Tenderness: None except the lisfranc joint-- and-- medial longitudinal arch  ROM: subtalar motion was restricted   Left Ankle: ROM: limited ROM in all planes   Right Ankle: ROM: limited ROM in all planes   Neurological Exam: performed  Light touch was decreased bilaterally  Vibratory sensation was decreased in both first metatarsophalangeal joints  Deep tendon reflexes: achilles reflex absent bilateraly-- and-- 4/5 L5 testing bilateral    Vascular Exam: performed Dorsalis pedis pulses were diminished bilaterally  Posterior tibial pulses were diminished bilaterally  Dependence rubor was present bilaterally  Capillary refill time was Negative digital hair noted, but-- between 1-3 seconds bilaterally  Toenails: All of the toenails were elongated,-- hypertrophied,-- discolored-- and--   Hyperkeratosis: present on both first toes  Shoe Gear Evaluation: performed ()  Recommendation(s): SAS style       Patient's shoes and socks removed  Right Foot/Ankle   Right Foot Inspection        Sensory   Vibration: diminished  Proprioception: diminished      Vascular  Capillary refills: elevated        Left Foot/Ankle  Left Foot Inspection                    Sensory   Vibration: diminished  Proprioception: diminished     Vascular  Capillary refills: elevated    Patient's shoes and socks removed  Assign Risk Category:  Deformity present;  Loss of protective sensation; Weak pulses       Risk: 2

## 2020-02-14 ENCOUNTER — TELEPHONE (OUTPATIENT)
Dept: HEMATOLOGY ONCOLOGY | Facility: CLINIC | Age: 80
End: 2020-02-14

## 2020-02-14 NOTE — TELEPHONE ENCOUNTER
New Patient Encounter    New Patient Intake Form   Patient Details:  Anais Day  1940  [id number]    Background Information:  [address] starts by opening a telephone encounter and gathering the following information   Who is calling to schedule? If not self, relationship to patient? self   Referring Provider self   What is the diagnosis? leichens sclerosis   Is this diagnosis confirmed Yes   Is there a confirmed diagnosis from a biopsy/tissue reviewed by pathology? No, not recent   Is there any prior history of Cancer? Yes   If yes, please explain Breast ca 2006   When was the diagnosis? 2009   Is patient aware of diagnosis? Yes   Reason for visit? History Of   Have you had any testing done? If so: when, where? Not recent   Are records in EPIC? Yes / allscripts   Was the patient told to bring a disk? no   Scheduling Information:   Preferred [place]: [facility]     Requesting Specific Provider? Janece Page   Are there any dates/time the patient cannot be seen? no      Miscellaneous:    After completing the above information, please route to Financial Counselor and the appropriate Nurse Navigator for review

## 2020-02-18 NOTE — ASSESSMENT & PLAN NOTE
BP stable on lisinopril and metoprolol  No new orthopedic concerns. Continue present treatment plan per medicine.

## 2020-02-25 DIAGNOSIS — I10 HTN (HYPERTENSION): ICD-10-CM

## 2020-02-25 RX ORDER — LISINOPRIL 40 MG/1
40 TABLET ORAL DAILY
Qty: 90 TABLET | Refills: 1 | Status: SHIPPED | OUTPATIENT
Start: 2020-02-25 | End: 2020-08-06 | Stop reason: ALTCHOICE

## 2020-02-26 ENCOUNTER — OFFICE VISIT (OUTPATIENT)
Dept: NEUROLOGY | Facility: CLINIC | Age: 80
End: 2020-02-26
Payer: MEDICARE

## 2020-02-26 VITALS
HEIGHT: 64 IN | HEART RATE: 58 BPM | DIASTOLIC BLOOD PRESSURE: 66 MMHG | WEIGHT: 226 LBS | BODY MASS INDEX: 38.58 KG/M2 | SYSTOLIC BLOOD PRESSURE: 110 MMHG

## 2020-02-26 DIAGNOSIS — G25.81 RLS (RESTLESS LEGS SYNDROME): ICD-10-CM

## 2020-02-26 DIAGNOSIS — E11.42 DIABETIC POLYNEUROPATHY ASSOCIATED WITH TYPE 2 DIABETES MELLITUS (HCC): Primary | ICD-10-CM

## 2020-02-26 PROCEDURE — 99214 OFFICE O/P EST MOD 30 MIN: CPT | Performed by: PSYCHIATRY & NEUROLOGY

## 2020-02-26 PROCEDURE — 1036F TOBACCO NON-USER: CPT | Performed by: PSYCHIATRY & NEUROLOGY

## 2020-02-26 PROCEDURE — 4040F PNEUMOC VAC/ADMIN/RCVD: CPT | Performed by: PSYCHIATRY & NEUROLOGY

## 2020-02-26 PROCEDURE — 3008F BODY MASS INDEX DOCD: CPT | Performed by: PSYCHIATRY & NEUROLOGY

## 2020-02-26 PROCEDURE — 2022F DILAT RTA XM EVC RTNOPTHY: CPT | Performed by: PSYCHIATRY & NEUROLOGY

## 2020-02-26 PROCEDURE — 1160F RVW MEDS BY RX/DR IN RCRD: CPT | Performed by: PSYCHIATRY & NEUROLOGY

## 2020-02-26 PROCEDURE — 3078F DIAST BP <80 MM HG: CPT | Performed by: PSYCHIATRY & NEUROLOGY

## 2020-02-26 PROCEDURE — 3074F SYST BP LT 130 MM HG: CPT | Performed by: PSYCHIATRY & NEUROLOGY

## 2020-02-26 NOTE — PROGRESS NOTES
Patient ID: Rodrigo Landrum is a 70 y o  female  Assessment/Plan:    Painful diabetic polyneuropathy    Restless leg syndrome    Continue gabapentin and Mirapex    Patient should have a both medicine enough till April  I advised the patient that when I will get the new refill request I will do so    Return to office visit in 4 months  Subjective:    24-year-old female followed in the office for a painful diabetic polyneuropathy  Patient is taking gabapentin  Patient also carries a diagnosis of restless leg syndrome for which she has been treated with Mirapex  Since her last visit in October 23, 2019 patient reported only 2 times she had a restlessness in the legs while she was lying down but once see get up and walk symptoms disappears  She reported having no creepy crawling sensation, numbness, pins and needles or tingling sensation  Since her last visit she had a 3 times sharp shooting pain which lasted only for seconds  Patient also had some leg aches on both side right more so than left however since she started doing the stretching does achy pain in the legs have subsided  Patient denies upon asking feeling of walking with bunched up socks or walking on emmanuel  Patient denies any other neurological symptoms upon asking each in detail except that she does have left-sided mild hearing loss associated with the tinnitus  Her negative symptoms are as follows  Pt denies double vision, blurred vision, transient monocular blindness, vertigo, tinnitus, hearing loss, slurred speech, difficulty expressing and understanding, dysphasia, and focal weakness, numbness, imbalance and incoordination  Pt denies bladder and bowel urgency, frequency and incontinence         Past Medical History:   Diagnosis Date    [facility]     Carrero's esophagus     last assessed: 1/23/2018    Breast cancer ([facility] )     stage 1 (left), given adjuvant radiation with Arimidex x 5 years    Cancer ([facility] [address] [address] )     Left Breast, Lumpectomy    Cataract     last assessed: 3/11/2014    Dysplasia of toenail     last assessed: 2017    Esophageal reflux     GERD (gastroesophageal reflux disease)     Gross hematuria     last assessed: 2015    Hematuria     Hiatal hernia     History of radiation therapy     Hypertension     Irregular heart beat     AFIB    Mixed sensory-motor polyneuropathy     Neuropathy     Obesity     Pacemaker     Paroxysmal atrial fibrillation (HCC)     Peripheral neuropathy     Rectal bleeding     Restless leg syndrome     Shortness of breath     last assessed: 2016       Family History   Problem Relation Age of Onset    Hypertension Mother     Heart disease Father     Aneurysm Father     Coronary artery disease Father         in his 76s with aneurysm    Aortic aneurysm Father         abdominal    Scleroderma Sister     Breast cancer Sister 76    Hypertension Sister     Cancer Sister     No Known Problems Son     Testicular cancer Son     Thyroid cancer Son     Colon cancer Maternal Aunt     Colon cancer Maternal Aunt     Alcohol abuse Neg Hx     Substance Abuse Neg Hx     Mental illness Neg Hx     Depression Neg Hx    ,    Social History     Socioeconomic History    Marital status: /Civil Union     Spouse name: Not on file    Number of children: Not on file    Years of education: Not on file    Highest education level: Not on file   Occupational History    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      Comment: retired   Social Needs    Financial resource strain: Not on file    Food insecurity:     Worry: Not on file     Inability: Not on file   [employer] needs:     Medical: Not on file     Non-medical: Not on file   Tobacco Use    Smoking status: Former Smoker     Packs/day: 1 00     Years: 25 00     Pack years: 25 00     Types: Cigarettes     Last attempt to quit: 1980     Years since quittin 4    Smokeless tobacco: Never Used    Tobacco comment: Quit over 30 years ago; quit age 41   Substance and Sexual Activity    Alcohol use: Not Currently    Drug use: No    Sexual activity: Not on file   Lifestyle    Physical activity:     Days per week: Not on file     Minutes per session: Not on file    Stress: Not on file   Relationships    Social connections:     Talks on phone: Not on file     Gets together: Not on file     Attends Quaker service: Not on file     Active member of club or organization: Not on file     Attends meetings of clubs or organizations: Not on file     Relationship status: Not on file    Intimate partner violence:     Fear of current or ex partner: Not on file     Emotionally abused: Not on file     Physically abused: Not on file     Forced sexual activity: Not on file   Other Topics Concern    Not on file   Social History Narrative           Current Outpatient Medications on File Prior to Visit   Medication Sig Dispense Refill    Acetaminophen (TYLENOL ARTHRITIS PAIN PO) Take 650 mg by mouth 2 (two) times a day      budesonide (PULMICORT) 90 MCG/ACT inhaler Inhale 1-2 puffs 2 (two) times a day 1 Inhaler 0    Calcium Acetate, Phos Binder, (CALCIUM ACETATE PO) Take by mouth daily        Cholecalciferol (VITAMIN D3) 5000 units CAPS Take by mouth daily       clobetasol (TEMOVATE) 0 05 % cream Apply topically once a week        furosemide (LASIX) 40 mg tablet TAKE 1 TABLET BY MOUTH EVERY DAY 90 tablet 0    gabapentin (NEURONTIN) 800 mg tablet Take 1 tablet (800 mg total) by mouth 4 (four) times a day 360 tablet 1    lisinopril (ZESTRIL) 40 mg tablet Take 1 tablet (40 mg total) by mouth daily As directed 90 tablet 1    loratadine (CLARITIN) 10 mg tablet Take 10 mg by mouth daily      metoprolol tartrate (LOPRESSOR) 50 mg tablet TAKE 1 & 1/2 TABLETS BY MOUTH TWICE A  tablet 2    multivitamin (THERAGRAN) TABS Take 1 tablet by mouth daily      mupirocin (BACTROBAN) 2 % ointment Apply topically 3 (three) times a day prn 30 g 1    pramipexole (MIRAPEX) 0 5 mg tablet One and half tablet at 5:00 p m  and 10:00 p m  270 tablet 1    Probiotic Product (PROBIOTIC PO) Take by mouth daily       warfarin (COUMADIN) 7 5 mg tablet Take 7 5 mg by mouth daily       No current facility-administered medications on file prior to visit  Objective:    Blood pressure 110/66, pulse 58, height 5' 4" (1 626 m), weight 103 kg (226 lb)  Physical Exam   Eyes: Pupils are equal, round, and reactive to light  EOM are normal    Neck: Normal carotid pulses present  Carotid bruit is not present  Neurological: She has normal strength and normal reflexes  Psychiatric: Her speech is normal        Neurological Exam  Mental Status   Oriented to person, place, time and situation  Recent and remote memory are intact  Speech is normal  Language is fluent with no aphasia  Cranial Nerves  CN II: Visual fields full to confrontation  CN III, IV, VI: Extraocular movements intact bilaterally  Pupils equal round and reactive to light bilaterally  CN V: Facial sensation is normal   CN VII: Full and symmetric facial movement  CN VIII: Hearing is normal   CN IX, X: Palate elevates symmetrically  Normal gag reflex  CN XI: Shoulder shrug strength is normal   CN XII: Tongue midline without atrophy or fasciculations  Motor  Normal muscle bulk throughout  No fasciculations present  Normal muscle tone  Strength is 5/5 throughout all four extremities  Sensory  Sensation is intact to light touch, pinprick, vibration and proprioception in all four extremities  Light touch is normal in upper and lower extremities  Pinprick is normal in upper and lower extremities  Proprioception is normal in upper and lower extremities  Reflexes  Deep tendon reflexes are 2+ and symmetric in all four extremities with downgoing toes bilaterally      Coordination  Right: Finger-to-nose normal  Heel-to-shin normal   Left: Finger-to-nose normal  Heel-to-shin normal     Gait  Normal casual, toe, heel and tandem gait  ROS:    Review of Systems   Constitutional: Negative  HENT: Negative  Eyes: Negative  Respiratory: Negative  Cardiovascular: Negative  Gastrointestinal: Negative  Endocrine: Negative  Genitourinary: Negative  Musculoskeletal: Negative  Skin: Negative  Allergic/Immunologic: Negative  Neurological: Negative  Hematological: Negative  Psychiatric/Behavioral: Negative

## 2020-02-27 ENCOUNTER — OFFICE VISIT (OUTPATIENT)
Dept: CARDIOLOGY CLINIC | Facility: CLINIC | Age: 80
End: 2020-02-27
Payer: MEDICARE

## 2020-02-27 VITALS
SYSTOLIC BLOOD PRESSURE: 128 MMHG | OXYGEN SATURATION: 96 % | BODY MASS INDEX: 38.24 KG/M2 | HEART RATE: 74 BPM | WEIGHT: 224 LBS | DIASTOLIC BLOOD PRESSURE: 82 MMHG | HEIGHT: 64 IN

## 2020-02-27 DIAGNOSIS — I48.20 CHRONIC ATRIAL FIBRILLATION (HCC): Primary | ICD-10-CM

## 2020-02-27 DIAGNOSIS — Z87.891 PAST USE OF TOBACCO: ICD-10-CM

## 2020-02-27 PROCEDURE — 3079F DIAST BP 80-89 MM HG: CPT | Performed by: INTERNAL MEDICINE

## 2020-02-27 PROCEDURE — 4040F PNEUMOC VAC/ADMIN/RCVD: CPT | Performed by: INTERNAL MEDICINE

## 2020-02-27 PROCEDURE — 2022F DILAT RTA XM EVC RTNOPTHY: CPT | Performed by: INTERNAL MEDICINE

## 2020-02-27 PROCEDURE — 3008F BODY MASS INDEX DOCD: CPT | Performed by: INTERNAL MEDICINE

## 2020-02-27 PROCEDURE — 1160F RVW MEDS BY RX/DR IN RCRD: CPT | Performed by: INTERNAL MEDICINE

## 2020-02-27 PROCEDURE — 3074F SYST BP LT 130 MM HG: CPT | Performed by: INTERNAL MEDICINE

## 2020-02-27 PROCEDURE — 99214 OFFICE O/P EST MOD 30 MIN: CPT | Performed by: INTERNAL MEDICINE

## 2020-02-27 PROCEDURE — 1036F TOBACCO NON-USER: CPT | Performed by: INTERNAL MEDICINE

## 2020-02-27 PROCEDURE — 93000 ELECTROCARDIOGRAM COMPLETE: CPT | Performed by: INTERNAL MEDICINE

## 2020-02-27 NOTE — PROGRESS NOTES
Cardiology Follow Up    [facility]  1940  [id number]  [facility]  [address]  [address]  [address] [id number] [phone]    1  Chronic atrial fibrillation  POCT ECG       Interval History:  Followup for atrial fibrillation    She is doing well  No chest pain, no dyspnea no palpitations  No issues with warfarin  Problem List     Hiatal hernia    Atrial fibrillation ([facility] ) [I48 [id number]]    Overview Signed 3/10/2018  8:59 AM by Sun Richmond MD     Description: s/p 2 unsuccessful ablation, s/p 2 cardioversion last in 2010, s/p pacemaker  ; on anticoagulation followed by cardiology         Acquired deformity of foot    Corns    Diabetic polyneuropathy associated with type 2 diabetes mellitus ([facility] )    Overview Signed 3/12/2018 12:21 PM by Sun Richmond MD     ?duloxetine           Lab Results   Component Value Date    HGBA1C 5 5 11/11/2019         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[facility] )    Radiculopathy of lumbar region    Primary osteoarthritis of both knees    Sensory polyneuropathy    RLS (restless legs syndrome)    Arthritis    Essential hypertension    Lichen sclerosus et atrophicus    Multiple lung nodules    Overview Addendum 3/12/2018 12:24 PM by Sun Richmond MD     Stable, no further CT  Severe obesity (BMI 35 0-39  9) with comorbidity ([facility] )    Osteoarthritis of left knee    Osteopenia of multiple sites    Peripheral neuropathy    Overview Signed 3/10/2018  8:59 AM by Sun Richmond MD     Transitioned From: Neuropathy         Vitamin D deficiency    Dense breast tissue on mammogram    Difficulty walking    Flat foot    Onychomycosis    Carrero's esophagus with dysplasia    Mild cognitive impairment    Pacemaker    GERD (gastroesophageal reflux disease)    Type 2 diabetes mellitus with diabetic peripheral angiopathy without gangrene ([facility] )    Overview Signed 3/7/2019 10:31 AM by Deangelo Peña Tiana     Per CMS ICD 10 Guidelines           Lab Results   Component Value Date    HGBA1C 5 5 2019         Pain in both feet    Chronic edema    Deep venous insufficiency    Colon polyps        Past Medical History:   Diagnosis Date    Atrial fibrillation ([facility] )     Carrero's esophagus     last assessed: 2018    Breast cancer ([facility] )     stage 1 (left), given adjuvant radiation with Arimidex x 5 years    Cancer [facility])     Left Breast, Lumpectomy    Cataract     last assessed: 3/11/2014    Dysplasia of toenail     last assessed: 2017    Esophageal reflux     GERD (gastroesophageal reflux disease)     Gross hematuria     last assessed: 2015    Hematuria     Hiatal hernia     History of radiation therapy     Hypertension     Irregular heart beat     AFIB    Mixed sensory-motor polyneuropathy     Neuropathy     Obesity     Pacemaker     Paroxysmal atrial fibrillation (HCC)     Peripheral neuropathy     Rectal bleeding     Restless leg syndrome     Shortness of breath     last assessed: 2016     Social History     Socioeconomic History    Marital status: /Civil Union     Spouse name: Not on file    Number of children: Not on file    Years of education: Not on file    Highest education level: Not on file   Occupational History    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      Comment: retired   Social Needs    Financial resource strain: Not on file    Food insecurity:     Worry: Not on file     Inability: Not on file   [employer] needs:     Medical: Not on file     Non-medical: Not on file   Tobacco Use    Smoking status: Former Smoker     Packs/day: 1 00     Years: 25 00     Pack years: 25 00     Types: Cigarettes     Last attempt to quit: 1980     Years since quittin 4    Smokeless tobacco: Never Used    Tobacco comment: Quit over 30 years ago; quit age 41   Substance and Sexual Activity    Alcohol use: Not Currently    Drug use: No    Sexual activity: Not on file   Lifestyle    Physical activity:     Days per week: Not on file     Minutes per session: Not on file    Stress: Not on file   Relationships    Social connections:     Talks on phone: Not on file     Gets together: Not on file     Attends Latter-day service: Not on file     Active member of club or organization: Not on file     Attends meetings of clubs or organizations: Not on file     Relationship status: Not on file    Intimate partner violence:     Fear of current or ex partner: Not on file     Emotionally abused: Not on file     Physically abused: Not on file     Forced sexual activity: Not on file   Other Topics Concern    Not on file   Social History Narrative          Family History   Problem Relation Age of Onset    Hypertension Mother     Heart disease Father     Aneurysm Father     Coronary artery disease Father         in his 76s with aneurysm    Aortic aneurysm Father         abdominal    Scleroderma Sister     Breast cancer Sister 76    Hypertension Sister     Cancer Sister     No Known Problems Son     Testicular cancer Son     Thyroid cancer Son     Colon cancer Maternal Aunt     Colon cancer Maternal Aunt     Alcohol abuse Neg Hx     Substance Abuse Neg Hx     Mental illness Neg Hx     Depression Neg Hx      Past Surgical History:   Procedure Laterality Date    BREAST BIOPSY      BREAST LUMPECTOMY Left     onset: 2006    BREAST SURGERY      CARDIAC PACEMAKER PLACEMENT      x 3 2006    CATARACT EXTRACTION      COLONOSCOPY      CYSTOSCOPY  04/04/2014    diagnostic    HYSTERECTOMY      ALISON BSO; due to fibroid uterus; age 38   [facility] KNEE CARTILAGE SURGERY      excision lesion of meniscus or capsule knee    KNEE SURGERY      OOPHORECTOMY Bilateral     OTHER SURGICAL HISTORY      radiation therapy    NV COLONOSCOPY FLX DX W/COLLJ SPEC WHEN PFRMD N/A 2/8/2017    Procedure: COLONOSCOPY;  Surgeon: Shelby Carrasquillo MD;  Location: [facility];  Service: Gastroenterology    CO ESOPHAGOGASTRODUODENOSCOPY TRANSORAL DIAGNOSTIC N/A 9/20/2017    Procedure: ESOPHAGOGASTRODUODENOSCOPY (EGD); Surgeon: Yesy Martinez MD;  Location: [facility];   Service: Gastroenterology    UPPER GASTROINTESTINAL ENDOSCOPY         Current Outpatient Medications:     Acetaminophen (TYLENOL ARTHRITIS PAIN PO), Take 650 mg by mouth 2 (two) times a day, Disp: , Rfl:     budesonide (PULMICORT) 90 MCG/ACT inhaler, Inhale 1-2 puffs 2 (two) times a day, Disp: 1 Inhaler, Rfl: 0    Calcium Acetate, Phos Binder, (CALCIUM ACETATE PO), Take by mouth daily  , Disp: , Rfl:     Cholecalciferol (VITAMIN D3) 5000 units CAPS, Take by mouth daily , Disp: , Rfl:     clobetasol (TEMOVATE) 0 05 % cream, Apply topically once a week  , Disp: , Rfl:     furosemide (LASIX) 40 mg tablet, TAKE 1 TABLET BY MOUTH EVERY DAY, Disp: 90 tablet, Rfl: 0    gabapentin (NEURONTIN) 800 mg tablet, Take 1 tablet (800 mg total) by mouth 4 (four) times a day, Disp: 360 tablet, Rfl: 1    lisinopril (ZESTRIL) 40 mg tablet, Take 1 tablet (40 mg total) by mouth daily As directed, Disp: 90 tablet, Rfl: 1    loratadine (CLARITIN) 10 mg tablet, Take 10 mg by mouth daily, Disp: , Rfl:     metoprolol tartrate (LOPRESSOR) 50 mg tablet, TAKE 1 & 1/2 TABLETS BY MOUTH TWICE A DAY, Disp: 270 tablet, Rfl: 2    multivitamin (THERAGRAN) TABS, Take 1 tablet by mouth daily, Disp: , Rfl:     mupirocin (BACTROBAN) 2 % ointment, Apply topically 3 (three) times a day prn, Disp: 30 g, Rfl: 1    pramipexole (MIRAPEX) 0 5 mg tablet, One and half tablet at 5:00 p m  and 10:00 p m , Disp: 270 tablet, Rfl: 1    Probiotic Product (PROBIOTIC PO), Take by mouth daily , Disp: , Rfl:     warfarin (COUMADIN) 7 5 mg tablet, Take 7 5 mg by mouth daily, Disp: , Rfl:   Allergies   Allergen Reactions    Cephalexin Rash    Duloxetine Hcl Other (See Comments)     Facial pins and needles sensation    Erythromycin Rash    Levofloxacin Other (See Comments)     Muscular aches    Penicillins Rash    Savella [Milnacipran] Rash    Sulfa Antibiotics Rash       Labs:     Chemistry        Component Value Date/Time     11/09/2015 1115    K 4 7 11/11/2019 1314    K 4 5 11/09/2015 1115     11/11/2019 1314     11/09/2015 1115    CO2 28 11/11/2019 1314    CO2 30 (H) 11/09/2015 1115    BUN 19 11/11/2019 1314    BUN 20 11/09/2015 1115    CREATININE 0 83 11/11/2019 1314    CREATININE 0 8 11/09/2015 1115        Component Value Date/Time    CALCIUM 9 7 11/11/2019 1314    CALCIUM 9 0 11/09/2015 1115    ALKPHOS 64 06/06/2019 0854    ALKPHOS 61 11/09/2015 1115    AST 20 06/06/2019 0854    AST 23 11/09/2015 1115    ALT 26 06/06/2019 0854    ALT 31 11/09/2015 1115    BILITOT 0 5 11/09/2015 1115            Lab Results   Component Value Date    CHOL 162 11/09/2015     Lab Results   Component Value Date    HDL 35 (L) 11/14/2019    HDL 41 03/05/2019    HDL 49 11/28/2018     Lab Results   Component Value Date    LDLCALC 80 11/14/2019    LDLCALC 105 (H) 03/05/2019    LDLCALC 124 (H) 11/28/2018     Lab Results   Component Value Date    TRIG 127 11/14/2019    TRIG 119 03/05/2019    TRIG 91 11/28/2018     No results found for: CHOLHDL    Imaging: No results found  EKG: Atrial Fibrillation    Review of Systems   Constitution: Negative  HENT: Negative  Eyes: Negative  Cardiovascular: Negative  Respiratory: Negative  Endocrine: Negative  Hematologic/Lymphatic: Negative  Skin: Negative  Musculoskeletal: Negative  Gastrointestinal: Negative  Genitourinary: Negative  Neurological: Negative  Psychiatric/Behavioral: Negative  Allergic/Immunologic: Negative  Vitals:    02/27/20 1408   BP: 128/82   Pulse: 74   SpO2: 96%           Physical Exam   Constitutional: She is oriented to person, place, and time  No distress  HENT:   Mouth/Throat: No oropharyngeal exudate  Eyes: No scleral icterus  Neck: No JVD present  Cardiovascular: Normal rate  An irregularly irregular rhythm present  Pulmonary/Chest: Effort normal and breath sounds normal  No stridor  No respiratory distress  She has no wheezes  Abdominal: Soft  Bowel sounds are normal  She exhibits no distension  There is no tenderness  There is no guarding  Musculoskeletal: She exhibits no edema  Neurological: She is alert and oriented to person, place, and time  Skin: Skin is warm and dry  She is not diaphoretic  Discussion/Summary:    1  Chronic Atrial Fibrillation: Overall doing well  Continue Metoprolol  Stable on Coumadin  2  Hypertension: Her BP is well controlled  Continue current medical therapy        3  s/p PPM: Continue device checks  Device checks were reviewed        4  Mild Aortic Stenosis: Will continue to follow           The patient was counseled regarding diagnostic results, instructions for management, risk factor reductions, impressions  total time of encounter was 25 minutes and 15 minutes was spent counseling

## 2020-03-11 ENCOUNTER — HOSPITAL ENCOUNTER (OUTPATIENT)
Dept: ULTRASOUND IMAGING | Facility: HOSPITAL | Age: 80
Discharge: HOME | End: 2020-03-11
Attending: INTERNAL MEDICINE
Payer: MEDICARE

## 2020-03-11 DIAGNOSIS — Z87.891 PAST USE OF TOBACCO: ICD-10-CM

## 2020-03-11 PROCEDURE — 76706 US ABDL AORTA SCREEN AAA: CPT

## 2020-03-13 ENCOUNTER — ANTICOAG VISIT (OUTPATIENT)
Dept: CARDIOLOGY CLINIC | Facility: CLINIC | Age: 80
End: 2020-03-13

## 2020-03-13 ENCOUNTER — APPOINTMENT (OUTPATIENT)
Dept: LAB | Facility: HOSPITAL | Age: 80
End: 2020-03-13
Payer: MEDICARE

## 2020-03-13 ENCOUNTER — TRANSCRIBE ORDERS (OUTPATIENT)
Dept: ADMINISTRATIVE | Facility: HOSPITAL | Age: 80
End: 2020-03-13

## 2020-03-13 DIAGNOSIS — I48.20 CHRONIC ATRIAL FIBRILLATION (HCC): ICD-10-CM

## 2020-03-13 DIAGNOSIS — I48.91 ATRIAL FIBRILLATION, UNSPECIFIED TYPE (HCC): ICD-10-CM

## 2020-03-13 DIAGNOSIS — I10 ESSENTIAL HYPERTENSION: ICD-10-CM

## 2020-03-13 LAB
ALBUMIN SERPL BCP-MCNC: 3.9 G/DL (ref 3.5–5)
ALP SERPL-CCNC: 81 U/L (ref 46–116)
ALT SERPL W P-5'-P-CCNC: 28 U/L (ref 12–78)
ANION GAP SERPL CALCULATED.3IONS-SCNC: 7 MMOL/L (ref 4–13)
AST SERPL W P-5'-P-CCNC: 24 U/L (ref 5–45)
BASOPHILS # BLD AUTO: 0.04 THOUSANDS/ΜL (ref 0–0.1)
BASOPHILS NFR BLD AUTO: 1 % (ref 0–1)
BILIRUB SERPL-MCNC: 0.8 MG/DL (ref 0.2–1)
BUN SERPL-MCNC: 29 MG/DL (ref 5–25)
CALCIUM SERPL-MCNC: 9.3 MG/DL (ref 8.3–10.1)
CHLORIDE SERPL-SCNC: 102 MMOL/L (ref 100–108)
CO2 SERPL-SCNC: 30 MMOL/L (ref 21–32)
CREAT SERPL-MCNC: 0.89 MG/DL (ref 0.6–1.3)
EOSINOPHIL # BLD AUTO: 0 THOUSAND/ΜL (ref 0–0.61)
EOSINOPHIL NFR BLD AUTO: 0 % (ref 0–6)
ERYTHROCYTE [DISTWIDTH] IN BLOOD BY AUTOMATED COUNT: 14.2 % (ref 11.6–15.1)
GFR SERPL CREATININE-BSD FRML MDRD: 63 ML/MIN/1.73SQ M
GLUCOSE P FAST SERPL-MCNC: 88 MG/DL (ref 65–99)
HCT VFR BLD AUTO: 40.3 % (ref 34.8–46.1)
HGB BLD-MCNC: 13 G/DL (ref 11.5–15.4)
IMM GRANULOCYTES # BLD AUTO: 0.02 THOUSAND/UL (ref 0–0.2)
IMM GRANULOCYTES NFR BLD AUTO: 0 % (ref 0–2)
LYMPHOCYTES # BLD AUTO: 1.96 THOUSANDS/ΜL (ref 0.6–4.47)
LYMPHOCYTES NFR BLD AUTO: 29 % (ref 14–44)
MCH RBC QN AUTO: 30.6 PG (ref 26.8–34.3)
MCHC RBC AUTO-ENTMCNC: 32.3 G/DL (ref 31.4–37.4)
MCV RBC AUTO: 95 FL (ref 82–98)
MONOCYTES # BLD AUTO: 0.59 THOUSAND/ΜL (ref 0.17–1.22)
MONOCYTES NFR BLD AUTO: 9 % (ref 4–12)
NEUTROPHILS # BLD AUTO: 4.09 THOUSANDS/ΜL (ref 1.85–7.62)
NEUTS SEG NFR BLD AUTO: 61 % (ref 43–75)
NRBC BLD AUTO-RTO: 0 /100 WBCS
PLATELET # BLD AUTO: 208 THOUSANDS/UL (ref 149–390)
PMV BLD AUTO: 11.4 FL (ref 8.9–12.7)
POTASSIUM SERPL-SCNC: 4.5 MMOL/L (ref 3.5–5.3)
PROT SERPL-MCNC: 7.5 G/DL (ref 6.4–8.2)
RBC # BLD AUTO: 4.25 MILLION/UL (ref 3.81–5.12)
SODIUM SERPL-SCNC: 139 MMOL/L (ref 136–145)
TSH SERPL DL<=0.05 MIU/L-ACNC: 1.57 UIU/ML (ref 0.36–3.74)
WBC # BLD AUTO: 6.7 THOUSAND/UL (ref 4.31–10.16)

## 2020-03-13 PROCEDURE — 85025 COMPLETE CBC W/AUTO DIFF WBC: CPT

## 2020-03-13 PROCEDURE — 80053 COMPREHEN METABOLIC PANEL: CPT

## 2020-03-13 PROCEDURE — 84443 ASSAY THYROID STIM HORMONE: CPT

## 2020-03-13 RX ORDER — FUROSEMIDE 40 MG/1
40 TABLET ORAL DAILY
Qty: 90 TABLET | Refills: 3 | Status: SHIPPED | OUTPATIENT
Start: 2020-03-13 | End: 2021-03-05

## 2020-03-18 ENCOUNTER — TELEPHONE (OUTPATIENT)
Dept: CARDIOLOGY CLINIC | Facility: CLINIC | Age: 80
End: 2020-03-18

## 2020-03-18 NOTE — TELEPHONE ENCOUNTER
Unable to leave voice mail       ----- Message from Tea Greene MD sent at 3/16/2020  9:45 AM EDT -----  No evidence of aneurysm  Some plaque is present

## 2020-03-19 ENCOUNTER — TELEPHONE (OUTPATIENT)
Dept: CARDIOLOGY CLINIC | Facility: CLINIC | Age: 80
End: 2020-03-19

## 2020-03-19 NOTE — TELEPHONE ENCOUNTER
----- Message from Aris Ross sent at 3/18/2020  5:31 PM EDT -----      ----- Message -----  From: Carlito Angeles MD  Sent: 3/16/2020   9:45 AM EDT  To: [facility]    No evidence of aneurysm  Some plaque is present

## 2020-04-02 DIAGNOSIS — G25.81 RLS (RESTLESS LEGS SYNDROME): ICD-10-CM

## 2020-04-02 DIAGNOSIS — G60.8 SENSORY POLYNEUROPATHY: ICD-10-CM

## 2020-04-02 RX ORDER — PRAMIPEXOLE DIHYDROCHLORIDE 0.5 MG/1
TABLET ORAL
Qty: 270 TABLET | Refills: 0 | Status: SHIPPED | OUTPATIENT
Start: 2020-04-02 | End: 2020-06-30 | Stop reason: SDUPTHER

## 2020-04-02 RX ORDER — GABAPENTIN 800 MG/1
800 TABLET ORAL 4 TIMES DAILY
Qty: 360 TABLET | Refills: 0 | Status: SHIPPED | OUTPATIENT
Start: 2020-04-02 | End: 2020-06-30 | Stop reason: SDUPTHER

## 2020-04-06 ENCOUNTER — TELEPHONE (OUTPATIENT)
Dept: GASTROENTEROLOGY | Facility: AMBULARY SURGERY CENTER | Age: 80
End: 2020-04-06

## 2020-04-10 ENCOUNTER — APPOINTMENT (OUTPATIENT)
Dept: LAB | Facility: CLINIC | Age: 80
End: 2020-04-10
Payer: MEDICARE

## 2020-04-10 ENCOUNTER — ANTICOAG VISIT (OUTPATIENT)
Dept: CARDIOLOGY CLINIC | Facility: CLINIC | Age: 80
End: 2020-04-10

## 2020-04-10 DIAGNOSIS — I48.20 CHRONIC ATRIAL FIBRILLATION (HCC): ICD-10-CM

## 2020-04-13 ENCOUNTER — TELEPHONE (OUTPATIENT)
Dept: BARIATRICS | Facility: CLINIC | Age: 80
End: 2020-04-13

## 2020-04-16 ENCOUNTER — REMOTE DEVICE CLINIC VISIT (OUTPATIENT)
Dept: CARDIOLOGY CLINIC | Facility: CLINIC | Age: 80
End: 2020-04-16
Payer: MEDICARE

## 2020-04-16 DIAGNOSIS — I48.20 CHRONIC ATRIAL FIBRILLATION (HCC): ICD-10-CM

## 2020-04-16 DIAGNOSIS — Z95.0 PRESENCE OF CARDIAC PACEMAKER: Primary | ICD-10-CM

## 2020-04-16 PROCEDURE — 93296 REM INTERROG EVL PM/IDS: CPT | Performed by: INTERNAL MEDICINE

## 2020-04-16 PROCEDURE — 93294 REM INTERROG EVL PM/LDLS PM: CPT | Performed by: INTERNAL MEDICINE

## 2020-04-16 RX ORDER — METOPROLOL TARTRATE 50 MG/1
75 TABLET, FILM COATED ORAL 2 TIMES DAILY
Qty: 270 TABLET | Refills: 0 | Status: CANCELLED | OUTPATIENT
Start: 2020-04-16

## 2020-04-17 DIAGNOSIS — I48.20 CHRONIC ATRIAL FIBRILLATION (HCC): ICD-10-CM

## 2020-04-17 RX ORDER — METOPROLOL TARTRATE 50 MG/1
75 TABLET, FILM COATED ORAL 2 TIMES DAILY
Qty: 270 TABLET | Refills: 2 | Status: SHIPPED | OUTPATIENT
Start: 2020-04-17 | End: 2020-08-06

## 2020-04-24 ENCOUNTER — APPOINTMENT (OUTPATIENT)
Dept: LAB | Facility: CLINIC | Age: 80
End: 2020-04-24
Payer: MEDICARE

## 2020-04-27 ENCOUNTER — ANTICOAG VISIT (OUTPATIENT)
Dept: CARDIOLOGY CLINIC | Facility: CLINIC | Age: 80
End: 2020-04-27

## 2020-04-27 DIAGNOSIS — I48.20 CHRONIC ATRIAL FIBRILLATION (HCC): ICD-10-CM

## 2020-05-11 ENCOUNTER — APPOINTMENT (OUTPATIENT)
Dept: LAB | Facility: CLINIC | Age: 80
End: 2020-05-11
Payer: MEDICARE

## 2020-05-11 ENCOUNTER — TELEPHONE (OUTPATIENT)
Dept: GYNECOLOGIC ONCOLOGY | Facility: CLINIC | Age: 80
End: 2020-05-11

## 2020-05-12 ENCOUNTER — ANTICOAG VISIT (OUTPATIENT)
Dept: CARDIOLOGY CLINIC | Facility: CLINIC | Age: 80
End: 2020-05-12

## 2020-05-12 DIAGNOSIS — I48.20 CHRONIC ATRIAL FIBRILLATION (HCC): ICD-10-CM

## 2020-05-14 ENCOUNTER — OFFICE VISIT (OUTPATIENT)
Dept: GYNECOLOGIC ONCOLOGY | Facility: CLINIC | Age: 80
End: 2020-05-14
Payer: MEDICARE

## 2020-05-14 VITALS
BODY MASS INDEX: 38.76 KG/M2 | SYSTOLIC BLOOD PRESSURE: 130 MMHG | HEIGHT: 64 IN | WEIGHT: 227 LBS | DIASTOLIC BLOOD PRESSURE: 84 MMHG | TEMPERATURE: 97.2 F | HEART RATE: 97 BPM

## 2020-05-14 DIAGNOSIS — N90.4 LICHEN SCLEROSUS ET ATROPHICUS OF THE VULVA: Primary | ICD-10-CM

## 2020-05-14 PROCEDURE — 88305 TISSUE EXAM BY PATHOLOGIST: CPT | Performed by: PATHOLOGY

## 2020-05-14 PROCEDURE — 99202 OFFICE O/P NEW SF 15 MIN: CPT | Performed by: OBSTETRICS & GYNECOLOGY

## 2020-05-14 PROCEDURE — 56821 COLPOSCOPY VULVA W/BIOPSY: CPT | Performed by: OBSTETRICS & GYNECOLOGY

## 2020-05-14 RX ORDER — CLOBETASOL PROPIONATE 0.5 MG/G
CREAM TOPICAL WEEKLY
Qty: 30 G | Refills: 4 | Status: SHIPPED | OUTPATIENT
Start: 2020-05-14 | End: 2020-05-20

## 2020-05-20 DIAGNOSIS — N90.4 LICHEN SCLEROSUS ET ATROPHICUS OF THE VULVA: Primary | ICD-10-CM

## 2020-05-20 RX ORDER — CLOBETASOL PROPIONATE 0.5 MG/G
OINTMENT TOPICAL
Qty: 30 G | Refills: 3 | Status: SHIPPED | OUTPATIENT
Start: 2020-05-20 | End: 2022-03-15 | Stop reason: SDUPTHER

## 2020-05-22 ENCOUNTER — HOSPITAL ENCOUNTER (OUTPATIENT)
Dept: MAMMOGRAPHY | Facility: HOSPITAL | Age: 80
Discharge: HOME | End: 2020-05-22
Payer: MEDICARE

## 2020-05-22 VITALS — BODY MASS INDEX: 38.76 KG/M2 | WEIGHT: 227 LBS | HEIGHT: 64 IN

## 2020-05-22 DIAGNOSIS — Z12.31 ENCOUNTER FOR SCREENING MAMMOGRAM FOR MALIGNANT NEOPLASM OF BREAST: ICD-10-CM

## 2020-05-22 PROCEDURE — 77067 SCR MAMMO BI INCL CAD: CPT

## 2020-05-22 PROCEDURE — 77063 BREAST TOMOSYNTHESIS BI: CPT

## 2020-05-27 LAB
LEFT EYE DIABETIC RETINOPATHY: NORMAL
RIGHT EYE DIABETIC RETINOPATHY: NORMAL

## 2020-06-05 ENCOUNTER — OFFICE VISIT (OUTPATIENT)
Dept: PODIATRY | Facility: CLINIC | Age: 80
End: 2020-06-05
Payer: MEDICARE

## 2020-06-05 VITALS
WEIGHT: 227 LBS | DIASTOLIC BLOOD PRESSURE: 84 MMHG | HEART RATE: 97 BPM | BODY MASS INDEX: 38.76 KG/M2 | SYSTOLIC BLOOD PRESSURE: 130 MMHG | RESPIRATION RATE: 17 BRPM | HEIGHT: 64 IN

## 2020-06-05 DIAGNOSIS — E11.51 TYPE 2 DIABETES MELLITUS WITH DIABETIC PERIPHERAL ANGIOPATHY WITHOUT GANGRENE, WITHOUT LONG-TERM CURRENT USE OF INSULIN (HCC): Primary | ICD-10-CM

## 2020-06-05 DIAGNOSIS — L84 CORNS: ICD-10-CM

## 2020-06-05 DIAGNOSIS — I70.209 PERIPHERAL ARTERIOSCLEROSIS (HCC): ICD-10-CM

## 2020-06-05 DIAGNOSIS — M79.671 PAIN IN BOTH FEET: ICD-10-CM

## 2020-06-05 DIAGNOSIS — M79.672 PAIN IN BOTH FEET: ICD-10-CM

## 2020-06-05 PROCEDURE — 11056 PARNG/CUTG B9 HYPRKR LES 2-4: CPT | Performed by: PODIATRIST

## 2020-06-11 ENCOUNTER — APPOINTMENT (OUTPATIENT)
Dept: LAB | Facility: HOSPITAL | Age: 80
End: 2020-06-11
Payer: MEDICARE

## 2020-06-11 ENCOUNTER — HOSPITAL ENCOUNTER (OUTPATIENT)
Dept: RADIOLOGY | Facility: HOSPITAL | Age: 80
Discharge: HOME | End: 2020-06-11
Payer: MEDICARE

## 2020-06-11 ENCOUNTER — TRANSCRIBE ORDERS (OUTPATIENT)
Dept: ADMINISTRATIVE | Facility: HOSPITAL | Age: 80
End: 2020-06-11

## 2020-06-11 ENCOUNTER — ANTICOAG VISIT (OUTPATIENT)
Dept: CARDIOLOGY CLINIC | Facility: CLINIC | Age: 80
End: 2020-06-11

## 2020-06-11 VITALS — BODY MASS INDEX: 38.76 KG/M2 | WEIGHT: 227 LBS | HEIGHT: 64 IN

## 2020-06-11 DIAGNOSIS — I48.20 CHRONIC ATRIAL FIBRILLATION (HCC): Primary | ICD-10-CM

## 2020-06-11 DIAGNOSIS — I48.20 CHRONIC ATRIAL FIBRILLATION (HCC): ICD-10-CM

## 2020-06-11 DIAGNOSIS — R92.8 ABNORMAL MAMMOGRAM: ICD-10-CM

## 2020-06-11 PROCEDURE — G0279 TOMOSYNTHESIS, MAMMO: HCPCS

## 2020-06-11 PROCEDURE — 77065 DX MAMMO INCL CAD UNI: CPT

## 2020-06-16 ENCOUNTER — OFFICE VISIT (OUTPATIENT)
Dept: GASTROENTEROLOGY | Facility: AMBULARY SURGERY CENTER | Age: 80
End: 2020-06-16
Payer: MEDICARE

## 2020-06-16 VITALS
TEMPERATURE: 97.5 F | SYSTOLIC BLOOD PRESSURE: 110 MMHG | HEART RATE: 80 BPM | DIASTOLIC BLOOD PRESSURE: 80 MMHG | HEIGHT: 64 IN | RESPIRATION RATE: 18 BRPM | BODY MASS INDEX: 39.64 KG/M2 | WEIGHT: 232.2 LBS

## 2020-06-16 DIAGNOSIS — K21.9 GASTROESOPHAGEAL REFLUX DISEASE WITHOUT ESOPHAGITIS: Primary | ICD-10-CM

## 2020-06-16 DIAGNOSIS — K63.5 POLYP OF COLON, UNSPECIFIED PART OF COLON, UNSPECIFIED TYPE: ICD-10-CM

## 2020-06-16 PROCEDURE — 3074F SYST BP LT 130 MM HG: CPT | Performed by: INTERNAL MEDICINE

## 2020-06-16 PROCEDURE — 2022F DILAT RTA XM EVC RTNOPTHY: CPT | Performed by: INTERNAL MEDICINE

## 2020-06-16 PROCEDURE — 99214 OFFICE O/P EST MOD 30 MIN: CPT | Performed by: INTERNAL MEDICINE

## 2020-06-16 PROCEDURE — 1036F TOBACCO NON-USER: CPT | Performed by: INTERNAL MEDICINE

## 2020-06-16 PROCEDURE — 1160F RVW MEDS BY RX/DR IN RCRD: CPT | Performed by: INTERNAL MEDICINE

## 2020-06-16 PROCEDURE — 3079F DIAST BP 80-89 MM HG: CPT | Performed by: INTERNAL MEDICINE

## 2020-06-16 PROCEDURE — 3008F BODY MASS INDEX DOCD: CPT | Performed by: INTERNAL MEDICINE

## 2020-06-16 PROCEDURE — 4040F PNEUMOC VAC/ADMIN/RCVD: CPT | Performed by: INTERNAL MEDICINE

## 2020-06-22 ENCOUNTER — OFFICE VISIT (OUTPATIENT)
Dept: INTERNAL MEDICINE CLINIC | Facility: CLINIC | Age: 80
End: 2020-06-22
Payer: MEDICARE

## 2020-06-22 VITALS
DIASTOLIC BLOOD PRESSURE: 78 MMHG | OXYGEN SATURATION: 95 % | TEMPERATURE: 97.4 F | SYSTOLIC BLOOD PRESSURE: 120 MMHG | HEART RATE: 80 BPM | HEIGHT: 65 IN | WEIGHT: 228 LBS | BODY MASS INDEX: 37.99 KG/M2

## 2020-06-22 DIAGNOSIS — N90.4 LICHEN SCLEROSUS ET ATROPHICUS OF THE VULVA: ICD-10-CM

## 2020-06-22 DIAGNOSIS — E55.9 VITAMIN D DEFICIENCY: ICD-10-CM

## 2020-06-22 DIAGNOSIS — G25.81 RLS (RESTLESS LEGS SYNDROME): ICD-10-CM

## 2020-06-22 DIAGNOSIS — I10 ESSENTIAL HYPERTENSION: ICD-10-CM

## 2020-06-22 DIAGNOSIS — M85.89 OSTEOPENIA OF MULTIPLE SITES: ICD-10-CM

## 2020-06-22 DIAGNOSIS — K22.719 BARRETT'S ESOPHAGUS WITH DYSPLASIA: ICD-10-CM

## 2020-06-22 DIAGNOSIS — I48.20 CHRONIC ATRIAL FIBRILLATION (HCC): Primary | ICD-10-CM

## 2020-06-22 DIAGNOSIS — M17.0 PRIMARY OSTEOARTHRITIS OF BOTH KNEES: ICD-10-CM

## 2020-06-22 DIAGNOSIS — Z00.00 HEALTH MAINTENANCE EXAMINATION: ICD-10-CM

## 2020-06-22 DIAGNOSIS — E11.42 DIABETIC POLYNEUROPATHY ASSOCIATED WITH TYPE 2 DIABETES MELLITUS (HCC): ICD-10-CM

## 2020-06-22 DIAGNOSIS — E66.01 SEVERE OBESITY (BMI 35.0-39.9) WITH COMORBIDITY (HCC): ICD-10-CM

## 2020-06-22 PROCEDURE — 1123F ACP DISCUSS/DSCN MKR DOCD: CPT | Performed by: INTERNAL MEDICINE

## 2020-06-22 PROCEDURE — 1160F RVW MEDS BY RX/DR IN RCRD: CPT | Performed by: INTERNAL MEDICINE

## 2020-06-22 PROCEDURE — G0439 PPPS, SUBSEQ VISIT: HCPCS | Performed by: INTERNAL MEDICINE

## 2020-06-22 PROCEDURE — 4040F PNEUMOC VAC/ADMIN/RCVD: CPT | Performed by: INTERNAL MEDICINE

## 2020-06-22 PROCEDURE — 2022F DILAT RTA XM EVC RTNOPTHY: CPT | Performed by: INTERNAL MEDICINE

## 2020-06-22 PROCEDURE — 1036F TOBACCO NON-USER: CPT | Performed by: INTERNAL MEDICINE

## 2020-06-22 PROCEDURE — 1170F FXNL STATUS ASSESSED: CPT | Performed by: INTERNAL MEDICINE

## 2020-06-22 PROCEDURE — 3078F DIAST BP <80 MM HG: CPT | Performed by: INTERNAL MEDICINE

## 2020-06-22 PROCEDURE — 3008F BODY MASS INDEX DOCD: CPT | Performed by: INTERNAL MEDICINE

## 2020-06-22 PROCEDURE — 1125F AMNT PAIN NOTED PAIN PRSNT: CPT | Performed by: INTERNAL MEDICINE

## 2020-06-22 PROCEDURE — 3074F SYST BP LT 130 MM HG: CPT | Performed by: INTERNAL MEDICINE

## 2020-06-22 PROCEDURE — 99214 OFFICE O/P EST MOD 30 MIN: CPT | Performed by: INTERNAL MEDICINE

## 2020-06-25 ENCOUNTER — APPOINTMENT (OUTPATIENT)
Dept: RADIOLOGY | Facility: CLINIC | Age: 80
End: 2020-06-25
Payer: MEDICARE

## 2020-06-25 ENCOUNTER — OFFICE VISIT (OUTPATIENT)
Dept: OBGYN CLINIC | Facility: CLINIC | Age: 80
End: 2020-06-25
Payer: MEDICARE

## 2020-06-25 VITALS
WEIGHT: 235 LBS | DIASTOLIC BLOOD PRESSURE: 65 MMHG | HEIGHT: 65 IN | HEART RATE: 90 BPM | SYSTOLIC BLOOD PRESSURE: 93 MMHG | BODY MASS INDEX: 39.15 KG/M2 | TEMPERATURE: 96 F

## 2020-06-25 DIAGNOSIS — M17.0 BILATERAL PRIMARY OSTEOARTHRITIS OF KNEE: Primary | ICD-10-CM

## 2020-06-25 DIAGNOSIS — M25.562 CHRONIC PAIN OF BOTH KNEES: ICD-10-CM

## 2020-06-25 DIAGNOSIS — M17.0 BILATERAL PRIMARY OSTEOARTHRITIS OF KNEE: ICD-10-CM

## 2020-06-25 DIAGNOSIS — G89.29 CHRONIC PAIN OF BOTH KNEES: ICD-10-CM

## 2020-06-25 DIAGNOSIS — M25.561 CHRONIC PAIN OF BOTH KNEES: ICD-10-CM

## 2020-06-25 PROCEDURE — 3008F BODY MASS INDEX DOCD: CPT | Performed by: ORTHOPAEDIC SURGERY

## 2020-06-25 PROCEDURE — 1036F TOBACCO NON-USER: CPT | Performed by: ORTHOPAEDIC SURGERY

## 2020-06-25 PROCEDURE — 3074F SYST BP LT 130 MM HG: CPT | Performed by: ORTHOPAEDIC SURGERY

## 2020-06-25 PROCEDURE — 4040F PNEUMOC VAC/ADMIN/RCVD: CPT | Performed by: ORTHOPAEDIC SURGERY

## 2020-06-25 PROCEDURE — 3078F DIAST BP <80 MM HG: CPT | Performed by: ORTHOPAEDIC SURGERY

## 2020-06-25 PROCEDURE — 2022F DILAT RTA XM EVC RTNOPTHY: CPT | Performed by: ORTHOPAEDIC SURGERY

## 2020-06-25 PROCEDURE — 99213 OFFICE O/P EST LOW 20 MIN: CPT | Performed by: ORTHOPAEDIC SURGERY

## 2020-06-25 PROCEDURE — 1170F FXNL STATUS ASSESSED: CPT | Performed by: ORTHOPAEDIC SURGERY

## 2020-06-25 PROCEDURE — 73562 X-RAY EXAM OF KNEE 3: CPT

## 2020-06-25 PROCEDURE — 1160F RVW MEDS BY RX/DR IN RCRD: CPT | Performed by: ORTHOPAEDIC SURGERY

## 2020-06-27 DIAGNOSIS — G25.81 RLS (RESTLESS LEGS SYNDROME): ICD-10-CM

## 2020-06-30 ENCOUNTER — OFFICE VISIT (OUTPATIENT)
Dept: NEUROLOGY | Facility: CLINIC | Age: 80
End: 2020-06-30
Payer: MEDICARE

## 2020-06-30 VITALS
WEIGHT: 226 LBS | BODY MASS INDEX: 37.65 KG/M2 | HEART RATE: 111 BPM | SYSTOLIC BLOOD PRESSURE: 130 MMHG | DIASTOLIC BLOOD PRESSURE: 70 MMHG | TEMPERATURE: 98.2 F | HEIGHT: 65 IN

## 2020-06-30 DIAGNOSIS — R73.03 BORDERLINE DIABETES: ICD-10-CM

## 2020-06-30 DIAGNOSIS — G62.9 LARGE FIBER NEUROPATHY: ICD-10-CM

## 2020-06-30 DIAGNOSIS — G62.9 PERIPHERAL POLYNEUROPATHY: Primary | ICD-10-CM

## 2020-06-30 DIAGNOSIS — G60.8 SENSORY POLYNEUROPATHY: ICD-10-CM

## 2020-06-30 DIAGNOSIS — G25.81 RLS (RESTLESS LEGS SYNDROME): ICD-10-CM

## 2020-06-30 DIAGNOSIS — G25.81 RESTLESS LEGS SYNDROME (RLS): ICD-10-CM

## 2020-06-30 PROCEDURE — 3078F DIAST BP <80 MM HG: CPT | Performed by: PSYCHIATRY & NEUROLOGY

## 2020-06-30 PROCEDURE — 3075F SYST BP GE 130 - 139MM HG: CPT | Performed by: PSYCHIATRY & NEUROLOGY

## 2020-06-30 PROCEDURE — 3008F BODY MASS INDEX DOCD: CPT | Performed by: PSYCHIATRY & NEUROLOGY

## 2020-06-30 PROCEDURE — 2022F DILAT RTA XM EVC RTNOPTHY: CPT | Performed by: PSYCHIATRY & NEUROLOGY

## 2020-06-30 PROCEDURE — 1170F FXNL STATUS ASSESSED: CPT | Performed by: PSYCHIATRY & NEUROLOGY

## 2020-06-30 PROCEDURE — 1036F TOBACCO NON-USER: CPT | Performed by: PSYCHIATRY & NEUROLOGY

## 2020-06-30 PROCEDURE — 1160F RVW MEDS BY RX/DR IN RCRD: CPT | Performed by: PSYCHIATRY & NEUROLOGY

## 2020-06-30 PROCEDURE — 99214 OFFICE O/P EST MOD 30 MIN: CPT | Performed by: PSYCHIATRY & NEUROLOGY

## 2020-06-30 PROCEDURE — 4040F PNEUMOC VAC/ADMIN/RCVD: CPT | Performed by: PSYCHIATRY & NEUROLOGY

## 2020-06-30 RX ORDER — PRAMIPEXOLE DIHYDROCHLORIDE 0.5 MG/1
TABLET ORAL
Qty: 180 TABLET | Refills: 1 | Status: SHIPPED | OUTPATIENT
Start: 2020-06-30 | End: 2020-06-30 | Stop reason: SDUPTHER

## 2020-06-30 RX ORDER — PRAMIPEXOLE DIHYDROCHLORIDE 0.5 MG/1
TABLET ORAL
Qty: 360 TABLET | Refills: 1 | Status: SHIPPED | OUTPATIENT
Start: 2020-06-30 | End: 2021-01-04

## 2020-06-30 RX ORDER — GABAPENTIN 800 MG/1
800 TABLET ORAL 4 TIMES DAILY
Qty: 360 TABLET | Refills: 1 | Status: SHIPPED | OUTPATIENT
Start: 2020-06-30 | End: 2021-01-05 | Stop reason: SDUPTHER

## 2020-07-02 RX ORDER — PRAMIPEXOLE DIHYDROCHLORIDE 0.5 MG/1
TABLET ORAL
Qty: 270 TABLET | Refills: 0 | OUTPATIENT
Start: 2020-07-02

## 2020-07-08 ENCOUNTER — TELEPHONE (OUTPATIENT)
Dept: INTERNAL MEDICINE CLINIC | Facility: CLINIC | Age: 80
End: 2020-07-08

## 2020-07-08 ENCOUNTER — OFFICE VISIT (OUTPATIENT)
Dept: OBGYN CLINIC | Facility: CLINIC | Age: 80
End: 2020-07-08
Payer: MEDICARE

## 2020-07-08 VITALS
WEIGHT: 226.2 LBS | HEIGHT: 65 IN | TEMPERATURE: 96.7 F | DIASTOLIC BLOOD PRESSURE: 70 MMHG | SYSTOLIC BLOOD PRESSURE: 100 MMHG | BODY MASS INDEX: 37.69 KG/M2

## 2020-07-08 DIAGNOSIS — Z01.812 PRE-OPERATIVE LABORATORY EXAMINATION: ICD-10-CM

## 2020-07-08 DIAGNOSIS — E66.01 SEVERE OBESITY (BMI 35.0-39.9) WITH COMORBIDITY (HCC): ICD-10-CM

## 2020-07-08 DIAGNOSIS — E11.51 TYPE 2 DIABETES MELLITUS WITH DIABETIC PERIPHERAL ANGIOPATHY WITHOUT GANGRENE, WITHOUT LONG-TERM CURRENT USE OF INSULIN (HCC): ICD-10-CM

## 2020-07-08 DIAGNOSIS — I48.20 CHRONIC ATRIAL FIBRILLATION (HCC): ICD-10-CM

## 2020-07-08 DIAGNOSIS — I10 ESSENTIAL HYPERTENSION: ICD-10-CM

## 2020-07-08 DIAGNOSIS — Z11.59 SCREENING FOR VIRAL DISEASE: ICD-10-CM

## 2020-07-08 DIAGNOSIS — M25.562 CHRONIC PAIN OF LEFT KNEE: ICD-10-CM

## 2020-07-08 DIAGNOSIS — G62.9 PERIPHERAL POLYNEUROPATHY: ICD-10-CM

## 2020-07-08 DIAGNOSIS — Z95.0 PACEMAKER: ICD-10-CM

## 2020-07-08 DIAGNOSIS — K22.719 BARRETT'S ESOPHAGUS WITH DYSPLASIA: ICD-10-CM

## 2020-07-08 DIAGNOSIS — M17.0 PRIMARY OSTEOARTHRITIS OF BOTH KNEES: Primary | ICD-10-CM

## 2020-07-08 DIAGNOSIS — G89.29 CHRONIC PAIN OF LEFT KNEE: ICD-10-CM

## 2020-07-08 PROCEDURE — 1160F RVW MEDS BY RX/DR IN RCRD: CPT | Performed by: ORTHOPAEDIC SURGERY

## 2020-07-08 PROCEDURE — 2022F DILAT RTA XM EVC RTNOPTHY: CPT | Performed by: ORTHOPAEDIC SURGERY

## 2020-07-08 PROCEDURE — 99214 OFFICE O/P EST MOD 30 MIN: CPT | Performed by: ORTHOPAEDIC SURGERY

## 2020-07-08 PROCEDURE — 3078F DIAST BP <80 MM HG: CPT | Performed by: ORTHOPAEDIC SURGERY

## 2020-07-08 PROCEDURE — 4040F PNEUMOC VAC/ADMIN/RCVD: CPT | Performed by: ORTHOPAEDIC SURGERY

## 2020-07-08 PROCEDURE — 1170F FXNL STATUS ASSESSED: CPT | Performed by: ORTHOPAEDIC SURGERY

## 2020-07-08 PROCEDURE — 3074F SYST BP LT 130 MM HG: CPT | Performed by: ORTHOPAEDIC SURGERY

## 2020-07-08 PROCEDURE — 3008F BODY MASS INDEX DOCD: CPT | Performed by: ORTHOPAEDIC SURGERY

## 2020-07-08 PROCEDURE — 1036F TOBACCO NON-USER: CPT | Performed by: ORTHOPAEDIC SURGERY

## 2020-07-08 RX ORDER — CHLORHEXIDINE GLUCONATE 0.12 MG/ML
15 RINSE ORAL ONCE
Status: CANCELLED | OUTPATIENT
Start: 2020-08-17 | End: 2020-07-08

## 2020-07-08 RX ORDER — FERROUS SULFATE TAB EC 324 MG (65 MG FE EQUIVALENT) 324 (65 FE) MG
324 TABLET DELAYED RESPONSE ORAL
Qty: 30 TABLET | Refills: 0 | Status: SHIPPED | OUTPATIENT
Start: 2020-07-08 | End: 2020-08-18 | Stop reason: HOSPADM

## 2020-07-08 RX ORDER — FOLIC ACID 1 MG/1
1 TABLET ORAL DAILY
Qty: 30 TABLET | Refills: 0 | Status: SHIPPED | OUTPATIENT
Start: 2020-07-08 | End: 2020-08-18 | Stop reason: HOSPADM

## 2020-07-08 NOTE — TELEPHONE ENCOUNTER
Pt states BP has been low last week   93/65  96/77  67/55    Today @ ortho was 100/70  She does not feel dizzy, but does feel like she is in a fog    No headache, does not feel like she is going to pass out

## 2020-07-08 NOTE — PROGRESS NOTES
Assessment/Plan:  1  Primary osteoarthritis of both knees  Case request operating room: ARTHROPLASTY KNEE TOTAL    Comprehensive metabolic panel    Hemoglobin A1C W/EAG Estimation    CBC and differential    If Symptomatic, order: UA w Reflex to Microscopic w Reflex to Culture    Iron Panel (Includes Iron Saturation, Iron, and TIBC)    Protime-INR    APTT    Type and screen    MRSA culture    Ambulatory referral to Cardiology    Ambulatory referral to [facility]    Ambulatory referral to Physical Therapy    EKG 12 lead    XR chest pa & lateral    PAT Covid Screening    Case request operating room: ARTHROPLASTY KNEE TOTAL    ascorbic Acid (VITAMIN C) 086 MG CPCR    folic acid (FOLVITE) 1 mg tablet    ferrous sulfate 324 (65 Fe) mg   2  Chronic pain of left knee  Case request operating room: ARTHROPLASTY KNEE TOTAL    Comprehensive metabolic panel    Hemoglobin A1C W/EAG Estimation    CBC and differential    If Symptomatic, order: UA w Reflex to Microscopic w Reflex to Culture    Iron Panel (Includes Iron Saturation, Iron, and TIBC)    Protime-INR    APTT    Type and screen    MRSA culture    Ambulatory referral to Cardiology    Ambulatory referral to [facility]    Ambulatory referral to Physical Therapy    EKG 12 lead    XR chest pa & lateral    PAT Covid Screening    Case request operating room: ARTHROPLASTY KNEE TOTAL    ascorbic Acid (VITAMIN C) 918 MG CPCR    folic acid (FOLVITE) 1 mg tablet    ferrous sulfate 324 (65 Fe) mg   3  Carrero's esophagus with dysplasia  Ambulatory referral to Cardiology    Ambulatory referral to [facility]   4  Type 2 diabetes mellitus with diabetic peripheral angiopathy without gangrene, without long-term current use of insulin [facility])  Ambulatory referral to Cardiology    Ambulatory referral to [facility]   5  Chronic atrial fibrillation [facility])  Ambulatory referral to Cardiology    Ambulatory referral to [facility]   6   Essential hypertension  Ambulatory referral to Cardiology    Ambulatory referral to [facility]   7  Peripheral polyneuropathy  Ambulatory referral to Cardiology    Ambulatory referral to [facility]   8  Severe obesity (BMI 35 0-39  9) with comorbidity [facility])  Ambulatory referral to Cardiology    Ambulatory referral to [facility]   9  Pacemaker  Ambulatory referral to Cardiology    Ambulatory referral to [facility]   10  Pre-operative laboratory examination  Comprehensive metabolic panel    Hemoglobin A1C W/EAG Estimation    CBC and differential    If Symptomatic, order: UA w Reflex to Microscopic w Reflex to Culture    Iron Panel (Includes Iron Saturation, Iron, and TIBC)    Protime-INR    APTT    Type and screen    MRSA culture    Ambulatory referral to Cardiology    Ambulatory referral to [facility]    Ambulatory referral to Physical Therapy    EKG 12 lead    XR chest pa & lateral    PAT Covid Screening   11  Screening for viral disease  Ambulatory referral to Cardiology    Ambulatory referral to Jefry 39 is a pleasant 68-year-old female presenting today for follow-up of her activity related left knee pain due to her severe end-stage underlying osteoarthritis and mild varus deformity  She has attempted and failed conservative treatment with Tylenol, physical therapy, use of ambulatory assistive device, home exercise program, weight loss, cortisone injections, and most recently viscosupplementation  Risks and benefits of pursuing a left total knee arthroplasty including but not limited to bleeding, infection, stiffness, need for further surgery, failure of the prosthesis, damage to nerves or vessels, blood clots, heart attack, stroke, and death were discussed  Consents were signed and placed into the chart for a left total knee arthroplasty    She does understand that she is at elevated risk due to her medical comorbidities including long-term anticoagulation with Coumadin due to AFib and diabetes with neuropathy  She is a former smoker, has well-controlled diabetes, and has a history of GI bleed  She denies any history of DVT or PE, malignancy, or MRSA infection  She will need clearance from her primary care physician and a cardiologist prior to the intended procedure  We will need recommendations on Coumadin management  We would like to bridge with Lovenox postoperatively for 5 days to decrease the risk of postoperative hematoma  She was introduced to surgical scheduler for further planning  We look for to taking care of her in the near future    Subjective: Left knee follow up    Patient ID: Cristal Marquez is a 70 y o  female  Turner Lopez is a pleasant 59-year-old female presenting today for follow-up of her activity related left knee pain due to her severe end-stage underlying osteoarthritis  She is presenting with her , Frank Barnett, with the intent to book a left total knee arthroplasty  She has attempted and failed all forms of conservative treatment  She has lost more weight since her previous appointment and intends to lose more for any proposed surgery  Her right knee is still doing okay  Review of Systems   Constitutional: Negative  HENT: Negative  Eyes: Negative  Respiratory: Negative  Cardiovascular: Negative  Gastrointestinal: Negative  Endocrine: Negative  Genitourinary: Negative  Musculoskeletal: Positive for arthralgias, joint swelling and myalgias  Skin: Negative  Allergic/Immunologic: Negative  Neurological: Negative  Hematological: Negative  Psychiatric/Behavioral: Negative          Past Medical History:   Diagnosis Date    Atrial fibrillation ([facility] )     Carrero's esophagus     last assessed: 1/23/2018    BRCA1 negative     BRCA2 negative     Breast cancer ([facility] ) 2006    stage 1 (left), given adjuvant radiation with Arimidex x 5 years    Cancer [facility])     Left Breast, Lumpectomy    Cataract     last assessed: 3/11/2014    Dysplasia of toenail     last assessed: 8/29/2017    Esophageal reflux     GERD (gastroesophageal reflux disease)     Gross hematuria     last assessed: 2/19/2015    Hematuria     Hiatal hernia     History of radiation therapy     Hypertension     Irregular heart beat     AFIB    Mixed sensory-motor polyneuropathy     Neuropathy     Obesity     Pacemaker     Paroxysmal atrial fibrillation (HCC)     Peripheral neuropathy     Rectal bleeding     Restless leg syndrome     Shortness of breath     last assessed: 1/11/2016       Past Surgical History:   Procedure Laterality Date    BREAST BIOPSY Left 2006    BREAST LUMPECTOMY Left 2006    onset: 2006    BREAST SURGERY      CARDIAC PACEMAKER PLACEMENT      x 3 2006    CATARACT EXTRACTION      COLONOSCOPY      CYSTOSCOPY  04/04/2014    diagnostic    HYSTERECTOMY      ALISON BSO; due to fibroid uterus; age 38   [facility] KNEE CARTILAGE SURGERY      excision lesion of meniscus or capsule knee    KNEE SURGERY      OOPHORECTOMY Bilateral     OTHER SURGICAL HISTORY      radiation therapy    WV COLONOSCOPY FLX DX W/COLLJ SPEC WHEN PFRMD N/A 2/8/2017    Procedure: COLONOSCOPY;  Surgeon: Mariana Horta MD;  [facility]; Service: Gastroenterology    WV ESOPHAGOGASTRODUODENOSCOPY TRANSORAL DIAGNOSTIC N/A 9/20/2017    Procedure: ESOPHAGOGASTRODUODENOSCOPY (EGD); Surgeon: Emili Williamson MD;  [facility]: [facility];   Service: Gastroenterology    UPPER GASTROINTESTINAL ENDOSCOPY         Family History   Problem Relation Age of Onset    Hypertension Mother     Heart disease Father     Aneurysm Father     Coronary artery disease Father         in his 76s with aneurysm    Aortic aneurysm Father         abdominal    Scleroderma Sister     Breast cancer Sister 76    Hypertension Sister     Cancer Sister     No Known Problems Son     No Known Problems Son     Testicular cancer Son 39    Thyroid cancer Son 45    Colon cancer Maternal Aunt     Colon cancer Maternal Aunt     Breast cancer Other 48        kaylee's daughter    Alcohol abuse Neg Hx     Substance Abuse Neg Hx     Mental illness Neg Hx     Depression Neg Hx        Social History     Occupational History    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      Comment: retired   Tobacco Use    Smoking status: Former Smoker     Packs/day: 1      Years: 25 00     Pack years: 25 00     Types: Cigarettes     Last attempt to quit: 1980     Years since quittin 8    Smokeless tobacco: Never Used    Tobacco comment: Quit over 30 years ago; quit age 41   Substance and Sexual Activity    Alcohol use: Not Currently    Drug use: No    Sexual activity: Not on file         Current Outpatient Medications:     Acetaminophen (TYLENOL ARTHRITIS PAIN PO), Take 650 mg by mouth daily , Disp: , Rfl:     budesonide (PULMICORT) 90 MCG/ACT inhaler, Inhale 1-2 puffs 2 (two) times a day, Disp: 1 Inhaler, Rfl: 0    Calcium Acetate, Phos Binder, (CALCIUM ACETATE PO), Take by mouth daily  , Disp: , Rfl:     Cholecalciferol (VITAMIN D3) 5000 units CAPS, Take by mouth daily , Disp: , Rfl:     clobetasol (TEMOVATE) 0 05 % ointment, Apply once weekly to the affected area, Disp: 30 g, Rfl: 3    furosemide (LASIX) 40 mg tablet, Take 1 tablet (40 mg total) by mouth daily, Disp: 90 tablet, Rfl: 3    gabapentin (NEURONTIN) 800 mg tablet, Take 1 tablet (800 mg total) by mouth 4 (four) times a day, Disp: 360 tablet, Rfl: 1    lisinopril (ZESTRIL) 40 mg tablet, Take 1 tablet (40 mg total) by mouth daily As directed, Disp: 90 tablet, Rfl: 1    loratadine (CLARITIN) 10 mg tablet, Take 10 mg by mouth daily, Disp: , Rfl:     metoprolol tartrate (LOPRESSOR) 50 mg tablet, Take 1 5 tablets (75 mg total) by mouth 2 (two) times a day, Disp: 270 tablet, Rfl: 2    multivitamin (THERAGRAN) TABS, Take 1 tablet by mouth daily, Disp: , Rfl:     pramipexole (MIRAPEX) 0 5 mg tablet, One and half tablet at 5:00 p m  and 10:00 p m , Disp: 360 tablet, Rfl: 1    Probiotic Product (PROBIOTIC PO), Take by mouth daily , Disp: , Rfl:     warfarin (COUMADIN) 7 5 mg tablet, Take 7 5 mg by mouth daily, Disp: , Rfl:     ascorbic Acid (VITAMIN C) 500 MG CPCR, Take 1 capsule (500 mg total) by mouth 2 (two) times a day, Disp: 60 capsule, Rfl: 0    ferrous sulfate 324 (65 Fe) mg, Take 1 tablet (324 mg total) by mouth daily before breakfast, Disp: 30 tablet, Rfl: 0    folic acid (FOLVITE) 1 mg tablet, Take 1 tablet (1 mg total) by mouth daily, Disp: 30 tablet, Rfl: 0    Allergies   Allergen Reactions    Cephalexin Rash    Duloxetine Hcl Other (See Comments)     Facial pins and needles sensation    Erythromycin Rash    Levofloxacin Other (See Comments)     Muscular aches    Penicillins Rash    Savella [Milnacipran] Rash    Sulfa Antibiotics Rash       Objective:  Vitals:    07/08/20 1129   BP: 100/70   Temp: (!) 96 7 °F (35 9 °C)       Body mass index is 38 23 kg/m²  Left Knee Exam     Muscle Strength   The patient has normal left knee strength  Tenderness   The patient is experiencing tenderness in the lateral joint line, patella and medial joint line  Range of Motion   Extension:  0 normal   Flexion: normal Left knee flexion: 115°     Tests   Ned:  Medial - negative Lateral - negative  Varus: negative Valgus: negative  Drawer:  Anterior - negative     Posterior - negative  Patellar apprehension: negative    Other   Erythema: absent  Scars: absent  Sensation: normal  Pulse: present  Swelling: none  Effusion: no effusion present    Comments:  Crepitance on AROM and PROM-parapatellar  + PFG  Grossly distally NVU with neuropathy of both feet  Ambulates without assistive device but with significantly antalgic gait  5-7 degree varus deformity passively correctable at neutral          Observations   Left Knee   Negative for effusion  Physical Exam   Constitutional: She is oriented to person, place, and time   She appears well-developed and well-nourished  Body mass index is 38 23 kg/m²  HENT:   Head: Normocephalic and atraumatic  Eyes: EOM are normal    Neck: Normal range of motion  Cardiovascular: Intact distal pulses  Pulmonary/Chest: Effort normal    Musculoskeletal:        Left knee: She exhibits no effusion  See ortho exam   Neurological: She is alert and oriented to person, place, and time  Skin: Skin is warm and dry  Psychiatric: She has a normal mood and affect  Her behavior is normal  Judgment and thought content normal    Nursing note and vitals reviewed  I have personally reviewed pertinent films in PACS of the weight-bearing x-rays taken previously of her left knee which demonstrate severe end-stage degenerative changes with varus deformities and bone-on-bone appearance of the medial compartment  She has tricompartmental osteophytosis and narrowing of the patellofemoral compartment  Sclerosis is also seen    There are no lytic or blastic lesions

## 2020-07-08 NOTE — TELEPHONE ENCOUNTER
Decrease metoprolol to 1 tablet twice a day (instead of 1 5 tablet)  Continue other BP meds  Please continue to monitor your BP daily and call next week with readings

## 2020-07-10 ENCOUNTER — HOSPITAL ENCOUNTER (OUTPATIENT)
Dept: RADIOLOGY | Facility: HOSPITAL | Age: 80
Discharge: HOME | End: 2020-07-10
Payer: MEDICARE

## 2020-07-10 ENCOUNTER — ANTICOAG VISIT (OUTPATIENT)
Dept: CARDIOLOGY CLINIC | Facility: CLINIC | Age: 80
End: 2020-07-10

## 2020-07-10 ENCOUNTER — TRANSCRIBE ORDERS (OUTPATIENT)
Dept: ADMINISTRATIVE | Facility: HOSPITAL | Age: 80
End: 2020-07-10

## 2020-07-10 ENCOUNTER — APPOINTMENT (OUTPATIENT)
Dept: LAB | Facility: HOSPITAL | Age: 80
End: 2020-07-10
Payer: MEDICARE

## 2020-07-10 DIAGNOSIS — M25.562 CHRONIC PAIN OF LEFT KNEE: ICD-10-CM

## 2020-07-10 DIAGNOSIS — E11.42 DIABETIC POLYNEUROPATHY ASSOCIATED WITH TYPE 2 DIABETES MELLITUS (HCC): ICD-10-CM

## 2020-07-10 DIAGNOSIS — Z01.812 PRE-OPERATIVE LABORATORY EXAMINATION: ICD-10-CM

## 2020-07-10 DIAGNOSIS — G89.29 CHRONIC PAIN OF LEFT KNEE: ICD-10-CM

## 2020-07-10 DIAGNOSIS — I48.20 CHRONIC ATRIAL FIBRILLATION (HCC): ICD-10-CM

## 2020-07-10 DIAGNOSIS — M17.0 PRIMARY OSTEOARTHRITIS OF BOTH KNEES: ICD-10-CM

## 2020-07-10 DIAGNOSIS — Z01.818 PRE-OP TESTING: Primary | ICD-10-CM

## 2020-07-10 DIAGNOSIS — Z01.818 PRE-OP TESTING: ICD-10-CM

## 2020-07-10 LAB
ABO GROUP BLD: NORMAL
ALBUMIN SERPL BCP-MCNC: 4 G/DL (ref 3.5–5)
ALP SERPL-CCNC: 77 U/L (ref 46–116)
ALT SERPL W P-5'-P-CCNC: 31 U/L (ref 12–78)
ANION GAP SERPL CALCULATED.3IONS-SCNC: 9 MMOL/L (ref 4–13)
APTT PPP: 39 SECONDS (ref 23–37)
AST SERPL W P-5'-P-CCNC: 25 U/L (ref 5–45)
ATRIAL RATE: 416 BPM
BACTERIA UR QL AUTO: ABNORMAL /HPF
BASOPHILS # BLD AUTO: 0.05 THOUSANDS/ΜL (ref 0–0.1)
BASOPHILS NFR BLD AUTO: 1 % (ref 0–1)
BILIRUB SERPL-MCNC: 0.9 MG/DL (ref 0.2–1)
BILIRUB UR QL STRIP: NEGATIVE
BLD GP AB SCN SERPL QL: NEGATIVE
BUN SERPL-MCNC: 35 MG/DL (ref 5–25)
CALCIUM SERPL-MCNC: 9.5 MG/DL (ref 8.3–10.1)
CHLORIDE SERPL-SCNC: 104 MMOL/L (ref 100–108)
CLARITY UR: CLEAR
CO2 SERPL-SCNC: 28 MMOL/L (ref 21–32)
COLOR UR: YELLOW
CREAT SERPL-MCNC: 0.91 MG/DL (ref 0.6–1.3)
CREAT UR-MCNC: 93.8 MG/DL
EOSINOPHIL # BLD AUTO: 0 THOUSAND/ΜL (ref 0–0.61)
EOSINOPHIL NFR BLD AUTO: 0 % (ref 0–6)
ERYTHROCYTE [DISTWIDTH] IN BLOOD BY AUTOMATED COUNT: 13.6 % (ref 11.6–15.1)
EST. AVERAGE GLUCOSE BLD GHB EST-MCNC: 108 MG/DL
FERRITIN SERPL-MCNC: 119 NG/ML (ref 8–388)
GFR SERPL CREATININE-BSD FRML MDRD: 61 ML/MIN/1.73SQ M
GLUCOSE P FAST SERPL-MCNC: 87 MG/DL (ref 65–99)
GLUCOSE UR STRIP-MCNC: NEGATIVE MG/DL
HBA1C MFR BLD: 5.4 %
HCT VFR BLD AUTO: 41.3 % (ref 34.8–46.1)
HGB BLD-MCNC: 13.4 G/DL (ref 11.5–15.4)
HGB UR QL STRIP.AUTO: NEGATIVE
HYALINE CASTS #/AREA URNS LPF: ABNORMAL /LPF
IMM GRANULOCYTES # BLD AUTO: 0.01 THOUSAND/UL (ref 0–0.2)
IMM GRANULOCYTES NFR BLD AUTO: 0 % (ref 0–2)
IRON SATN MFR SERPL: 33 %
IRON SERPL-MCNC: 125 UG/DL (ref 50–170)
KETONES UR STRIP-MCNC: NEGATIVE MG/DL
LEUKOCYTE ESTERASE UR QL STRIP: ABNORMAL
LYMPHOCYTES # BLD AUTO: 1.6 THOUSANDS/ΜL (ref 0.6–4.47)
LYMPHOCYTES NFR BLD AUTO: 27 % (ref 14–44)
MCH RBC QN AUTO: 31.1 PG (ref 26.8–34.3)
MCHC RBC AUTO-ENTMCNC: 32.4 G/DL (ref 31.4–37.4)
MCV RBC AUTO: 96 FL (ref 82–98)
MICROALBUMIN UR-MCNC: 14 MG/L (ref 0–20)
MICROALBUMIN/CREAT 24H UR: 15 MG/G CREATININE (ref 0–30)
MONOCYTES # BLD AUTO: 0.54 THOUSAND/ΜL (ref 0.17–1.22)
MONOCYTES NFR BLD AUTO: 9 % (ref 4–12)
NEUTROPHILS # BLD AUTO: 3.75 THOUSANDS/ΜL (ref 1.85–7.62)
NEUTS SEG NFR BLD AUTO: 63 % (ref 43–75)
NITRITE UR QL STRIP: NEGATIVE
NON-SQ EPI CELLS URNS QL MICRO: ABNORMAL /HPF
NRBC BLD AUTO-RTO: 0 /100 WBCS
PH UR STRIP.AUTO: 5.5 [PH]
PLATELET # BLD AUTO: 211 THOUSANDS/UL (ref 149–390)
PMV BLD AUTO: 11.7 FL (ref 8.9–12.7)
POTASSIUM SERPL-SCNC: 4.5 MMOL/L (ref 3.5–5.3)
PROT SERPL-MCNC: 8 G/DL (ref 6.4–8.2)
PROT UR STRIP-MCNC: NEGATIVE MG/DL
QRS AXIS: 48 DEGREES
QRSD INTERVAL: 100 MS
QT INTERVAL: 376 MS
QTC INTERVAL: 472 MS
RBC # BLD AUTO: 4.31 MILLION/UL (ref 3.81–5.12)
RBC #/AREA URNS AUTO: ABNORMAL /HPF
RH BLD: POSITIVE
SODIUM SERPL-SCNC: 141 MMOL/L (ref 136–145)
SP GR UR STRIP.AUTO: 1.01 (ref 1–1.03)
SPECIMEN EXPIRATION DATE: NORMAL
T WAVE AXIS: -52 DEGREES
TIBC SERPL-MCNC: 380 UG/DL (ref 250–450)
UROBILINOGEN UR QL STRIP.AUTO: 1 E.U./DL
VENTRICULAR RATE: 95 BPM
WBC # BLD AUTO: 5.95 THOUSAND/UL (ref 4.31–10.16)
WBC #/AREA URNS AUTO: ABNORMAL /HPF

## 2020-07-10 PROCEDURE — 82043 UR ALBUMIN QUANTITATIVE: CPT

## 2020-07-10 PROCEDURE — 86900 BLOOD TYPING SEROLOGIC ABO: CPT

## 2020-07-10 PROCEDURE — 82728 ASSAY OF FERRITIN: CPT

## 2020-07-10 PROCEDURE — 87081 CULTURE SCREEN ONLY: CPT

## 2020-07-10 PROCEDURE — 83550 IRON BINDING TEST: CPT

## 2020-07-10 PROCEDURE — 86850 RBC ANTIBODY SCREEN: CPT

## 2020-07-10 PROCEDURE — 80053 COMPREHEN METABOLIC PANEL: CPT

## 2020-07-10 PROCEDURE — 83540 ASSAY OF IRON: CPT

## 2020-07-10 PROCEDURE — 36415 COLL VENOUS BLD VENIPUNCTURE: CPT

## 2020-07-10 PROCEDURE — 81001 URINALYSIS AUTO W/SCOPE: CPT | Performed by: ORTHOPAEDIC SURGERY

## 2020-07-10 PROCEDURE — 85025 COMPLETE CBC W/AUTO DIFF WBC: CPT

## 2020-07-10 PROCEDURE — 86901 BLOOD TYPING SEROLOGIC RH(D): CPT

## 2020-07-10 PROCEDURE — 93010 ELECTROCARDIOGRAM REPORT: CPT | Performed by: INTERNAL MEDICINE

## 2020-07-10 PROCEDURE — 71046 X-RAY EXAM CHEST 2 VIEWS: CPT

## 2020-07-10 PROCEDURE — 85730 THROMBOPLASTIN TIME PARTIAL: CPT

## 2020-07-10 PROCEDURE — 93005 ELECTROCARDIOGRAM TRACING: CPT

## 2020-07-10 PROCEDURE — 83036 HEMOGLOBIN GLYCOSYLATED A1C: CPT

## 2020-07-10 PROCEDURE — 82570 ASSAY OF URINE CREATININE: CPT

## 2020-07-11 LAB — MRSA NOSE QL CULT: NORMAL

## 2020-07-16 ENCOUNTER — TELEPHONE (OUTPATIENT)
Dept: INTERNAL MEDICINE CLINIC | Facility: CLINIC | Age: 80
End: 2020-07-16

## 2020-07-16 ENCOUNTER — REMOTE DEVICE CLINIC VISIT (OUTPATIENT)
Dept: CARDIOLOGY CLINIC | Facility: CLINIC | Age: 80
End: 2020-07-16
Payer: MEDICARE

## 2020-07-16 DIAGNOSIS — Z95.0 PRESENCE OF PERMANENT CARDIAC PACEMAKER: Primary | ICD-10-CM

## 2020-07-16 PROCEDURE — 93294 REM INTERROG EVL PM/LDLS PM: CPT | Performed by: INTERNAL MEDICINE

## 2020-07-16 PROCEDURE — 93296 REM INTERROG EVL PM/IDS: CPT | Performed by: INTERNAL MEDICINE

## 2020-07-16 NOTE — TELEPHONE ENCOUNTER
Adjust lisinopril, tTake 1/2 tablet daily, continue other BP meds  Please call next week with BP readings

## 2020-07-16 NOTE — PROGRESS NOTES
Results for orders placed or performed in visit on 07/16/20   Cardiac EP device report    Narrative    MDT-SINGLE-PM/ NOT MRI CONDITIONAL  CARELINK TRANSMISSION: BATTERY VOLTAGE ADEQUATE  (2 5 YRS)   24%  ALL AVAILABLE LEAD PARAMETERS WITHIN NORMAL LIMITS  MULTIPLE VHR EPISODES DETECTED RVR NOTED  NORMAL DEVICE FUNCTION  ----OVERTON

## 2020-07-16 NOTE — TELEPHONE ENCOUNTER
BP's: 7/9 - 102/65 and p-78; 7//65, p-92; 7/11 -105/72,  p-82; 7/12 -104/66, p-87; 7//78, p-89; 7/14 - 101/67, p-75; 7//82, p-101; today was 87/63, p-76

## 2020-07-22 ENCOUNTER — TELEPHONE (OUTPATIENT)
Dept: GASTROENTEROLOGY | Facility: AMBULARY SURGERY CENTER | Age: 80
End: 2020-07-22

## 2020-07-22 NOTE — TELEPHONE ENCOUNTER
Pt is scheduled for egd/colonoscopy w/ dr bryant on 8/12/2020 at [facility]/dulcolax/golytely ordered/instructions mailed to pt/COVID TEST ORDERED FOR 8/6/2020/PT AWARE/medication clearance faxed to dr laguna's office/REQUESTING 5 DAY HOLD ON COUMADIN

## 2020-07-23 ENCOUNTER — TELEPHONE (OUTPATIENT)
Dept: CARDIOLOGY CLINIC | Facility: CLINIC | Age: 80
End: 2020-07-23

## 2020-07-23 NOTE — TELEPHONE ENCOUNTER
P/c'd she is to have EGD/ Colonscopy on 8/12/20 and the knee surgery on 8/17/20  She said she would be off her coumadin for 11 days in a row  She would like to know if she can be off coumadin for that long?       Please advise

## 2020-07-24 ENCOUNTER — TELEPHONE (OUTPATIENT)
Dept: INTERNAL MEDICINE CLINIC | Facility: CLINIC | Age: 80
End: 2020-07-24

## 2020-07-24 NOTE — TELEPHONE ENCOUNTER
Pt called with Blood Pressure Readings:    07/17  93/58   Pulse 77  07/18 118/74 Pulse 77  07/19 104/73 Pulse 86  07/20 83/58  Pulse 76  07/21 95/59 Pulse 86  07/22 94/61 Pulse 75  07/23 95/72 Pulse 94

## 2020-07-24 NOTE — TELEPHONE ENCOUNTER
Decrease metoprolol to 1 tablet (instead of 1 5 tab) twice a day  continue 1/2 tablet of lisinopril daily

## 2020-07-24 NOTE — TELEPHONE ENCOUNTER
Stop lisinopril then, continue 1 tablet of metoprolol twice a day  Please continue to monitor BP and pulse at home

## 2020-07-28 ENCOUNTER — TELEPHONE (OUTPATIENT)
Dept: OBGYN CLINIC | Facility: HOSPITAL | Age: 80
End: 2020-07-28

## 2020-07-28 RX ORDER — WARFARIN SODIUM 7.5 MG/1
7.5 TABLET ORAL
Status: CANCELLED | OUTPATIENT
Start: 2020-07-28

## 2020-07-28 NOTE — TELEPHONE ENCOUNTER
Preoperative Elective Admission Assessment-Spoke with pt    *EGD/colonoscopy scheduled for 8/12/20, pt will be holding coumadin x11 days pre-op-- this has been okayed by cardiology Dr Arcelia Mcgill in 7/23 telephone encounter  Sending to surgeon to also make him aware  *pt is reporting she was going to be attending a gathering of about 5 adults and 4 children on 8/6 and 8/7 after  COVID test for her EGD, pt wants to know if this is OK--- Sending to [address] [facility] Situation: lives with , Faustino Rothman, in a multi-level home; full bathroom on 2nd level with step-in tub that has grab bars; 1/2 bath on 1st floor                    Steps: #3 steps to enter, approximately #15 steps to 2nd level    First Floor Setup: Yes with a half bathroom    Post-op Caregiver: , Moe Manley Transport: , Faustino Rothman    Outpatient Physical Therapy Site: [facility]    DME: Pt has a RW, BSC, and cane     Patient's Current Level of Function: pt currently ambulates independently and is independent with her ADLs    Medication Management: Pt self manages her meds using a pillbox                    Preferred Pharmacy: [facility]                    Blood Management Vitamins: Yes pt confirms she is taking her oral vitamin regimen as prescribed by her surgeon                    Post-op anticoagulant: TBD by surgeon    DC Plan: Home with OP PT                    Barriers to DC identified preoperatively:  None    BMI: 38 23    [employer]: yes text to [phone] @ 1300; pt enrolled    Patient Education:  Pt educated on post-op pain, early mobilization (POD0), indication for/use of incentive spirometer (10x/hour while awake) and indication for/use of foot/leg pumps (18 hours/day)  educated that our goal, if at all possible, is to appropriately discharge patient based off their post-op function while striving to maintain maximal independence   If possible, the goal is to discharge patient to home and for them to attend outpatient physical therapy  I educated patient on the many benefits of outpt PT(Including maintaining independence, additional resources at outpt site, better outcomes etc  )  Also educated on how home PT vs  outpt PT is determined (while inpt)  pt denies having additional questions at this time  Pt encouraged to call me with any questions, concerns or issues

## 2020-07-29 NOTE — TELEPHONE ENCOUNTER
Thank you for the update  I will need to be aware if there is any further action needed from a GI perspective after her colonoscopy as we may need to delay surgery for her knee if this is the case  She will need repeat COVID testing after her colonoscopy and after her family gathering as per [place] guidelines

## 2020-07-30 DIAGNOSIS — I48.19 PERSISTENT ATRIAL FIBRILLATION (HCC): Primary | ICD-10-CM

## 2020-07-30 RX ORDER — WARFARIN SODIUM 7.5 MG/1
7.5 TABLET ORAL
Qty: 90 TABLET | Refills: 3 | Status: SHIPPED | OUTPATIENT
Start: 2020-07-30 | End: 2021-07-17

## 2020-07-31 NOTE — TELEPHONE ENCOUNTER
Pt verbalizes understanding and will call me with any issues/complications/results of EGD/ colonoscopy      Pt aware of need for repeat COVID testing prior to [facility] surgery if she attends the gathering-- pt reporting she is "probably not" attending the gathering but verbalizes understanding,

## 2020-08-03 ENCOUNTER — TELEPHONE (OUTPATIENT)
Dept: GASTROENTEROLOGY | Facility: AMBULARY SURGERY CENTER | Age: 80
End: 2020-08-03

## 2020-08-03 DIAGNOSIS — G89.29 CHRONIC PAIN OF LEFT KNEE: ICD-10-CM

## 2020-08-03 DIAGNOSIS — M25.562 CHRONIC PAIN OF LEFT KNEE: ICD-10-CM

## 2020-08-03 DIAGNOSIS — M17.0 PRIMARY OSTEOARTHRITIS OF BOTH KNEES: ICD-10-CM

## 2020-08-03 NOTE — TELEPHONE ENCOUNTER
Pt aware ok to hold coumadin 5 days prior to procedure 8/12/2020 w/ dr bryant )egd/colonoscopy) per dr Jose F Sanders

## 2020-08-04 RX ORDER — LANOLIN ALCOHOL/MO/W.PET/CERES
CREAM (GRAM) TOPICAL
Qty: 60 CAPSULE | Refills: 0 | Status: SHIPPED | OUTPATIENT
Start: 2020-08-04 | End: 2020-08-18 | Stop reason: HOSPADM

## 2020-08-06 ENCOUNTER — OFFICE VISIT (OUTPATIENT)
Dept: INTERNAL MEDICINE CLINIC | Facility: CLINIC | Age: 80
End: 2020-08-06
Payer: MEDICARE

## 2020-08-06 VITALS
TEMPERATURE: 96.9 F | OXYGEN SATURATION: 98 % | WEIGHT: 221.8 LBS | HEART RATE: 86 BPM | RESPIRATION RATE: 18 BRPM | DIASTOLIC BLOOD PRESSURE: 76 MMHG | SYSTOLIC BLOOD PRESSURE: 134 MMHG | BODY MASS INDEX: 36.96 KG/M2 | HEIGHT: 65 IN

## 2020-08-06 DIAGNOSIS — G89.29 CHRONIC PAIN OF LEFT KNEE: ICD-10-CM

## 2020-08-06 DIAGNOSIS — M25.562 CHRONIC PAIN OF LEFT KNEE: ICD-10-CM

## 2020-08-06 DIAGNOSIS — Z11.59 SCREENING FOR VIRAL DISEASE: ICD-10-CM

## 2020-08-06 DIAGNOSIS — K21.9 GASTROESOPHAGEAL REFLUX DISEASE WITHOUT ESOPHAGITIS: ICD-10-CM

## 2020-08-06 DIAGNOSIS — I10 ESSENTIAL HYPERTENSION: ICD-10-CM

## 2020-08-06 DIAGNOSIS — E66.01 SEVERE OBESITY (BMI 35.0-39.9) WITH COMORBIDITY (HCC): ICD-10-CM

## 2020-08-06 DIAGNOSIS — Z01.812 PRE-OPERATIVE LABORATORY EXAMINATION: ICD-10-CM

## 2020-08-06 DIAGNOSIS — I48.20 CHRONIC ATRIAL FIBRILLATION (HCC): ICD-10-CM

## 2020-08-06 DIAGNOSIS — Z01.818 PRE-OP EXAMINATION: Primary | ICD-10-CM

## 2020-08-06 DIAGNOSIS — Z95.0 PACEMAKER: ICD-10-CM

## 2020-08-06 DIAGNOSIS — M17.0 PRIMARY OSTEOARTHRITIS OF BOTH KNEES: ICD-10-CM

## 2020-08-06 DIAGNOSIS — G62.9 PERIPHERAL POLYNEUROPATHY: ICD-10-CM

## 2020-08-06 DIAGNOSIS — E11.51 TYPE 2 DIABETES MELLITUS WITH DIABETIC PERIPHERAL ANGIOPATHY WITHOUT GANGRENE, WITHOUT LONG-TERM CURRENT USE OF INSULIN (HCC): ICD-10-CM

## 2020-08-06 DIAGNOSIS — K22.719 BARRETT'S ESOPHAGUS WITH DYSPLASIA: ICD-10-CM

## 2020-08-06 PROCEDURE — 3078F DIAST BP <80 MM HG: CPT | Performed by: INTERNAL MEDICINE

## 2020-08-06 PROCEDURE — U0003 INFECTIOUS AGENT DETECTION BY NUCLEIC ACID (DNA OR RNA); SEVERE ACUTE RESPIRATORY SYNDROME CORONAVIRUS 2 (SARS-COV-2) (CORONAVIRUS DISEASE [COVID-19]), AMPLIFIED PROBE TECHNIQUE, MAKING USE OF HIGH THROUGHPUT TECHNOLOGIES AS DESCRIBED BY CMS-2020-01-R: HCPCS

## 2020-08-06 PROCEDURE — 3075F SYST BP GE 130 - 139MM HG: CPT | Performed by: INTERNAL MEDICINE

## 2020-08-06 PROCEDURE — 99214 OFFICE O/P EST MOD 30 MIN: CPT | Performed by: INTERNAL MEDICINE

## 2020-08-06 PROCEDURE — 3008F BODY MASS INDEX DOCD: CPT | Performed by: INTERNAL MEDICINE

## 2020-08-06 PROCEDURE — 3044F HG A1C LEVEL LT 7.0%: CPT | Performed by: INTERNAL MEDICINE

## 2020-08-06 PROCEDURE — 2022F DILAT RTA XM EVC RTNOPTHY: CPT | Performed by: INTERNAL MEDICINE

## 2020-08-06 PROCEDURE — 1036F TOBACCO NON-USER: CPT | Performed by: INTERNAL MEDICINE

## 2020-08-06 PROCEDURE — 1160F RVW MEDS BY RX/DR IN RCRD: CPT | Performed by: INTERNAL MEDICINE

## 2020-08-06 PROCEDURE — 4040F PNEUMOC VAC/ADMIN/RCVD: CPT | Performed by: INTERNAL MEDICINE

## 2020-08-06 RX ORDER — METOPROLOL TARTRATE 50 MG/1
50 TABLET, FILM COATED ORAL 2 TIMES DAILY
Qty: 180 TABLET | Refills: 0
Start: 2020-08-06 | End: 2021-01-06

## 2020-08-06 NOTE — ASSESSMENT & PLAN NOTE
Lab Results   Component Value Date    HGBA1C 5 4 07/10/2020     Recent A1c normal, not on medication

## 2020-08-06 NOTE — PROGRESS NOTES
Assessment/Plan:    Essential hypertension  BP stable  Lisinopril stopped due to hypotension, metoprolol also decreased to 50 mg bid  Still on daily furosemide  Primary osteoarthritis of both knees  Moderate risk for planned surgery, L knee  Type 2 diabetes mellitus with diabetic peripheral angiopathy without gangrene ([facility] )    Lab Results   Component Value Date    HGBA1C 5 4 07/10/2020     Recent A1c normal, not on medication  Severe obesity (BMI 35 0-39  9) with comorbidity ([facility] )  Lost 7 lbs since 2 months ago  RLS (restless legs syndrome)  On gabapentin, pramipexole  Per neurology  Carrero's esophagus with dysplasia  EGD scheduled, with colonoscopy  Atrial fibrillation ([place] [address] ) [[id number]]  Asymptomatic  On warfarin, metoprolol recently decreased  Keep appointment with cardiology next week  Diagnoses and all orders for this visit:    Pre-op examination  Comments:  PATs scheduled  Stop anticoagulation per cardiology  Primary osteoarthritis of both knees  -     Ambulatory referral to Family Practice    Chronic pain of left knee  -     Ambulatory referral to Family Practice    Carrero's esophagus with dysplasia  -     Ambulatory referral to Family Practice    Type 2 diabetes mellitus with diabetic peripheral angiopathy without gangrene, without long-term current use of insulin ([facility] )  -     Ambulatory referral to Family Practice    Chronic atrial fibrillation ([facility] )  -     Ambulatory referral to Family Practice  -     metoprolol tartrate (LOPRESSOR) 50 mg tablet; Take 1 tablet (50 mg total) by mouth 2 (two) times a day    Essential hypertension  -     Ambulatory referral to Family Practice    Peripheral polyneuropathy  -     Ambulatory referral to Family Practice    Severe obesity (BMI 35 0-39  9) with comorbidity [facility])  -     Ambulatory referral to Family Practice    Pacemaker  -     Ambulatory referral to [facility]    Pre-operative laboratory examination  -     Ambulatory referral to Family Practice    Screening for viral disease  -     Ambulatory referral to Family Practice    Gastroesophageal reflux disease without esophagitis      Follow up as scheduled or as needed  Subjective:      Patient ID: Willis Ivy is a 70 y o  female  Mrs Ortiz complains of bilateral knee pain  She has surgery scheduled for her left knee later this month  She denies any chest pain, shortness of breath or palpitations  She reports her blood pressure has improved, since medications adjusted  She reports having frequent low readings when she checks it at home  She has lost some weight since her last visit  She has an EGD scheduled prior to her knee surgery  The following portions of the patient's history were reviewed and updated as appropriate: allergies, current medications, past medical history, past social history, past surgical history and problem list     Review of Systems   Constitutional: Negative for appetite change and fatigue  HENT: Negative for congestion and ear pain  Eyes: Negative for visual disturbance  Respiratory: Negative for cough and shortness of breath  Cardiovascular: Negative for chest pain, palpitations and leg swelling  Gastrointestinal: Negative for abdominal pain, constipation and diarrhea  Genitourinary: Negative for dysuria and frequency  Musculoskeletal: Positive for arthralgias and gait problem  Negative for myalgias  Skin: Negative for rash and wound  Neurological: Negative for dizziness and headaches  Psychiatric/Behavioral: Negative for confusion  The patient is not nervous/anxious  Objective:      /76   Pulse 86   Temp (!) 96 9 °F (36 1 °C)   Resp 18   Ht 5' 4 5" (1 638 m)   Wt 101 kg (221 lb 12 8 oz)   SpO2 98%   BMI 37 48 kg/m²          Physical Exam   Constitutional: She is oriented to person, place, and time  She appears well-developed  HENT:   Head: Normocephalic and atraumatic     Eyes: Pupils are equal, round, and reactive to light  Cardiovascular: Normal rate  An irregularly irregular rhythm present  Murmur heard  Systolic murmur is present  Pulmonary/Chest: Effort normal and breath sounds normal  She has no wheezes  Abdominal: Soft  Bowel sounds are normal    Musculoskeletal:      Comments: Antalgic gait  Using cane   Neurological: She is alert and oriented to person, place, and time  Skin: Skin is warm  No rash noted  Psychiatric: Her behavior is normal    Nursing note and vitals reviewed  Lab results reviewed with patient

## 2020-08-06 NOTE — ASSESSMENT & PLAN NOTE
Asymptomatic  On warfarin, metoprolol recently decreased  Keep appointment with cardiology next week

## 2020-08-06 NOTE — ASSESSMENT & PLAN NOTE
BP stable  Lisinopril stopped due to hypotension, metoprolol also decreased to 50 mg bid  Still on daily furosemide

## 2020-08-07 NOTE — PRE-PROCEDURE INSTRUCTIONS
My Surgical Experience    The following information was developed to assist you to prepare for your operation  What do I need to do before coming to the hospital?   Arrange for a responsible person to drive you to and from the hospital    Arrange care for your children at home  Children are not allowed in the recovery areas of the hospital   Plan to wear clothing that is easy to put on and take off  If you are having shoulder surgery, wear a shirt that buttons or zippers in the front  Bathing  o Shower the evening before and the morning of your surgery with an antibacterial soap  Please refer to the Pre Op Showering Instructions for Surgery Patients Sheet   o Remove nail polish and all body piercing jewelry  o Do not shave any body part for at least 24 hours before surgery-this includes face, arms, legs and upper body  Food  o Nothing to eat or drink after midnight the night before your surgery  This includes candy and chewing gum  o Exception: If your surgery is after 12:00pm (noon), you may have clear liquids such as 7-Up®, ginger ale, apple or cranberry juice, Jell-O®, water, or clear broth until 8:00 am  o Do not drink milk or juice with pulp on the morning before surgery  o Do not drink alcohol 24 hours before surgery  Medicine  o Follow instructions you received from your surgeon about which medicines you may take on the day of surgery  o If instructed to take medicine on the morning of surgery, take pills with just a small sip of water  Call your prescribing doctor for specific infroamtion on what to do if you take insulin    What should I bring to the hospital?    Bring:  Mitcheal Zac or a walker, if you have them, for foot or knee surgery   A list of the daily medicines, vitamins, minerals, herbals and nutritional supplements you take   Include the dosages of medicines and the time you take them each day   Glasses, dentures or hearing aids   Minimal clothing; you will be wearing hospital sleepwear   Photo ID; required to verify your identity   If you have a Living Will or Power of , bring a copy of the documents   If you have an ostomy, bring an extra pouch and any supplies you use    Do not bring   Medicines or inhalers   Money, valuables or jewelry    What other information should I know about the day of surgery?  Notify your surgeons if you develop a cold, sore throat, cough, fever, rash or any other illness   Report to the Ambulatory Surgical/Same Day Surgery Unit   You will be instructed to stop at Registration only if you have not been pre-registered   Inform your  fi they do not stay that they will be asked by the staff to leave a phone number where they can be reached   Be available to be reached before surgery  In the event the operating room schedule changes, you may be asked to come in earlier or later than expected    *It is important to tell your doctor and others involved in your health care if you are taking or have been taking any non-prescription drugs, vitamins, minerals, herbals or other nutritional supplements  Any of these may interact with some food or medicines and cause a reaction      Pre-Surgery Instructions:   Medication Instructions    Acetaminophen (TYLENOL ARTHRITIS PAIN PO) Instructed patient per Anesthesia Guidelines   Ascorbic Acid (Vitamin C ER) 500 MG CPCR Instructed patient per Anesthesia Guidelines   Cholecalciferol (VITAMIN D3) 5000 units CAPS Instructed patient per Anesthesia Guidelines   clobetasol (TEMOVATE) 0 05 % ointment Instructed patient per Anesthesia Guidelines   ferrous sulfate 324 (65 Fe) mg Instructed patient per Anesthesia Guidelines   folic acid (FOLVITE) 1 mg tablet Instructed patient per Anesthesia Guidelines   furosemide (LASIX) 40 mg tablet Instructed patient per Anesthesia Guidelines   gabapentin (NEURONTIN) 800 mg tablet Instructed patient per Anesthesia Guidelines      metoprolol tartrate (LOPRESSOR) 50 mg tablet Instructed patient per Anesthesia Guidelines   multivitamin (THERAGRAN) TABS Instructed patient per Anesthesia Guidelines   pramipexole (MIRAPEX) 0 5 mg tablet Instructed patient per Anesthesia Guidelines   Probiotic Product (PROBIOTIC PO) Instructed patient per Anesthesia Guidelines   warfarin (COUMADIN) 7 5 mg tablet Instructed patient per Anesthesia Guidelines  To take metoprolol and gabapentin a m  of surgery

## 2020-08-07 NOTE — PRE-PROCEDURE INSTRUCTIONS
My Surgical Experience    The following information was developed to assist you to prepare for your operation  What do I need to do before coming to the hospital?   Arrange for a responsible person to drive you to and from the hospital    Arrange care for your children at home  Children are not allowed in the recovery areas of the hospital   Plan to wear clothing that is easy to put on and take off  If you are having shoulder surgery, wear a shirt that buttons or zippers in the front  Bathing  o Shower the evening before and the morning of your surgery with an antibacterial soap  Please refer to the Pre Op Showering Instructions for Surgery Patients Sheet   o Remove nail polish and all body piercing jewelry  o Do not shave any body part for at least 24 hours before surgery-this includes face, arms, legs and upper body  Food  o Nothing to eat or drink after midnight the night before your surgery  This includes candy and chewing gum  o Exception: If your surgery is after 12:00pm (noon), you may have clear liquids such as 7-Up®, ginger ale, apple or cranberry juice, Jell-O®, water, or clear broth until 8:00 am  o Do not drink milk or juice with pulp on the morning before surgery  o Do not drink alcohol 24 hours before surgery  Medicine  o Follow instructions you received from your surgeon about which medicines you may take on the day of surgery  o If instructed to take medicine on the morning of surgery, take pills with just a small sip of water  Call your prescribing doctor for specific infroamtion on what to do if you take insulin    What should I bring to the hospital?    Bring:  Dinah Malik or a walker, if you have them, for foot or knee surgery   A list of the daily medicines, vitamins, minerals, herbals and nutritional supplements you take   Include the dosages of medicines and the time you take them each day   Glasses, dentures or hearing aids   Minimal clothing; you will be wearing hospital sleepwear   Photo ID; required to verify your identity   If you have a Living Will or Power of , bring a copy of the documents   If you have an ostomy, bring an extra pouch and any supplies you use    Do not bring   Medicines or inhalers   Money, valuables or jewelry    What other information should I know about the day of surgery?  Notify your surgeons if you develop a cold, sore throat, cough, fever, rash or any other illness   Report to the Ambulatory Surgical/Same Day Surgery Unit   You will be instructed to stop at Registration only if you have not been pre-registered   Inform your  fi they do not stay that they will be asked by the staff to leave a phone number where they can be reached   Be available to be reached before surgery  In the event the operating room schedule changes, you may be asked to come in earlier or later than expected    *It is important to tell your doctor and others involved in your health care if you are taking or have been taking any non-prescription drugs, vitamins, minerals, herbals or other nutritional supplements  Any of these may interact with some food or medicines and cause a reaction      Pre-Surgery Instructions:   Medication Instructions    Acetaminophen (TYLENOL ARTHRITIS PAIN PO) Instructed patient per Anesthesia Guidelines   Ascorbic Acid (Vitamin C ER) 500 MG CPCR Instructed patient per Anesthesia Guidelines   Cholecalciferol (VITAMIN D3) 5000 units CAPS Instructed patient per Anesthesia Guidelines   clobetasol (TEMOVATE) 0 05 % ointment Instructed patient per Anesthesia Guidelines   ferrous sulfate 324 (65 Fe) mg Instructed patient per Anesthesia Guidelines   folic acid (FOLVITE) 1 mg tablet Instructed patient per Anesthesia Guidelines   furosemide (LASIX) 40 mg tablet Instructed patient per Anesthesia Guidelines   gabapentin (NEURONTIN) 800 mg tablet Instructed patient per Anesthesia Guidelines      metoprolol tartrate (LOPRESSOR) 50 mg tablet Instructed patient per Anesthesia Guidelines   multivitamin (THERAGRAN) TABS Instructed patient per Anesthesia Guidelines   pramipexole (MIRAPEX) 0 5 mg tablet Instructed patient per Anesthesia Guidelines   Probiotic Product (PROBIOTIC PO) Instructed patient per Anesthesia Guidelines   warfarin (COUMADIN) 7 5 mg tablet Instructed patient per Anesthesia Guidelines  To take metoprolol and gabapentin a m  of surgery

## 2020-08-08 LAB — SARS-COV-2 RNA SPEC QL NAA+PROBE: NOT DETECTED

## 2020-08-10 ENCOUNTER — OFFICE VISIT (OUTPATIENT)
Dept: CARDIOLOGY CLINIC | Facility: CLINIC | Age: 80
End: 2020-08-10
Payer: MEDICARE

## 2020-08-10 VITALS
OXYGEN SATURATION: 97 % | BODY MASS INDEX: 37.05 KG/M2 | DIASTOLIC BLOOD PRESSURE: 80 MMHG | HEART RATE: 92 BPM | HEIGHT: 65 IN | WEIGHT: 222.4 LBS | SYSTOLIC BLOOD PRESSURE: 122 MMHG

## 2020-08-10 DIAGNOSIS — I10 ESSENTIAL HYPERTENSION: ICD-10-CM

## 2020-08-10 DIAGNOSIS — E66.01 SEVERE OBESITY (BMI 35.0-39.9) WITH COMORBIDITY (HCC): ICD-10-CM

## 2020-08-10 DIAGNOSIS — M17.0 PRIMARY OSTEOARTHRITIS OF BOTH KNEES: ICD-10-CM

## 2020-08-10 DIAGNOSIS — Z11.59 SCREENING FOR VIRAL DISEASE: ICD-10-CM

## 2020-08-10 DIAGNOSIS — K22.719 BARRETT'S ESOPHAGUS WITH DYSPLASIA: ICD-10-CM

## 2020-08-10 DIAGNOSIS — Z95.0 PACEMAKER: ICD-10-CM

## 2020-08-10 DIAGNOSIS — M25.562 CHRONIC PAIN OF LEFT KNEE: ICD-10-CM

## 2020-08-10 DIAGNOSIS — G89.29 CHRONIC PAIN OF LEFT KNEE: ICD-10-CM

## 2020-08-10 DIAGNOSIS — E11.51 TYPE 2 DIABETES MELLITUS WITH DIABETIC PERIPHERAL ANGIOPATHY WITHOUT GANGRENE, WITHOUT LONG-TERM CURRENT USE OF INSULIN (HCC): ICD-10-CM

## 2020-08-10 DIAGNOSIS — I48.20 CHRONIC ATRIAL FIBRILLATION (HCC): ICD-10-CM

## 2020-08-10 DIAGNOSIS — Z01.812 PRE-OPERATIVE LABORATORY EXAMINATION: ICD-10-CM

## 2020-08-10 DIAGNOSIS — G62.9 PERIPHERAL POLYNEUROPATHY: ICD-10-CM

## 2020-08-10 PROCEDURE — 93000 ELECTROCARDIOGRAM COMPLETE: CPT | Performed by: INTERNAL MEDICINE

## 2020-08-10 PROCEDURE — 3079F DIAST BP 80-89 MM HG: CPT | Performed by: INTERNAL MEDICINE

## 2020-08-10 PROCEDURE — 4040F PNEUMOC VAC/ADMIN/RCVD: CPT | Performed by: INTERNAL MEDICINE

## 2020-08-10 PROCEDURE — 3008F BODY MASS INDEX DOCD: CPT | Performed by: INTERNAL MEDICINE

## 2020-08-10 PROCEDURE — 2022F DILAT RTA XM EVC RTNOPTHY: CPT | Performed by: INTERNAL MEDICINE

## 2020-08-10 PROCEDURE — 1036F TOBACCO NON-USER: CPT | Performed by: INTERNAL MEDICINE

## 2020-08-10 PROCEDURE — 99214 OFFICE O/P EST MOD 30 MIN: CPT | Performed by: INTERNAL MEDICINE

## 2020-08-10 PROCEDURE — 3074F SYST BP LT 130 MM HG: CPT | Performed by: INTERNAL MEDICINE

## 2020-08-10 PROCEDURE — 1160F RVW MEDS BY RX/DR IN RCRD: CPT | Performed by: INTERNAL MEDICINE

## 2020-08-10 PROCEDURE — 3044F HG A1C LEVEL LT 7.0%: CPT | Performed by: INTERNAL MEDICINE

## 2020-08-10 NOTE — PROGRESS NOTES
Cardiology Follow Up    Nory Christinaford  1940  [id number]  [facility]  [address]  [place] [address]  [address] [id number] [phone]    1  Primary osteoarthritis of both knees  Ambulatory referral to Cardiology   2  Chronic pain of left knee  Ambulatory referral to Cardiology   3  Carrero's esophagus with dysplasia  Ambulatory referral to Cardiology   4  Type 2 diabetes mellitus with diabetic peripheral angiopathy without gangrene, without long-term current use of insulin ([facility] )  Ambulatory referral to Cardiology   5  Chronic atrial fibrillation [facility])  Ambulatory referral to Cardiology    POCT ECG   6  Essential hypertension  Ambulatory referral to Cardiology    POCT ECG   7  Peripheral polyneuropathy  Ambulatory referral to Cardiology   8  Severe obesity (BMI 35 0-39  9) with comorbidity [facility])  Ambulatory referral to Cardiology   9  Pacemaker  Ambulatory referral to Cardiology   10  Pre-operative laboratory examination  Ambulatory referral to Cardiology   11  Screening for viral disease  Ambulatory referral to Cardiology       Interval History: Followup for atrial fibrillation    She has a colonoscopy and knee replacement coming up  She has no chest pain or dyspnea  INRs stable  No bleeding  Problem List     Hiatal hernia    Atrial fibrillation ([facility] ) [I48 [id number]]    Overview Signed 3/10/2018  8:59 AM by Luna Teague MD     Description: s/p 2 unsuccessful ablation, s/p 2 cardioversion last in 2010, s/p pacemaker   ; on anticoagulation followed by cardiology         Acquired deformity of foot    Corns    Diabetic polyneuropathy associated with type 2 diabetes mellitus ([facility] )    Overview Signed 3/12/2018 12:21 PM by Luna Teague MD     ?duloxetine           Lab Results   Component Value Date    HGBA1C 5 4 07/10/2020         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[facility] )    Radiculopathy of lumbar region    Primary osteoarthritis of both knees    Overview Signed 6/22/2020 10:15 AM by Floyd Sanchez MD     S/p injections         Sensory polyneuropathy    RLS (restless legs syndrome)    Arthritis    Essential hypertension    Multiple lung nodules    Overview Addendum 6/22/2020 10:16 AM by Floyd Sanchez MD     Stable, no further CT  Last CT 2016         Severe obesity (BMI 35 0-39  9) with comorbidity ([facility] )    Osteoarthritis of left knee    Osteopenia of multiple sites    Peripheral neuropathy    Overview Signed 3/10/2018  8:59 AM by Floyd Sanchez MD     Transitioned From: Neuropathy         Vitamin D deficiency    Dense breast tissue on mammogram    Difficulty walking    Flat foot    Onychomycosis    Carrero's esophagus with dysplasia    Mild cognitive impairment    Pacemaker    GERD (gastroesophageal reflux disease)    Type 2 diabetes mellitus with diabetic peripheral angiopathy without gangrene ([facility] )    Overview Signed 3/7/2019 10:31 AM by Yanira Guzmán     Per CMS ICD 10 Guidelines           Lab Results   Component Value Date    HGBA1C 5 4 07/10/2020         Pain in both feet    Chronic edema    Deep venous insufficiency    Colon polyps    Lichen sclerosus et atrophicus of the vulva        Past Medical History:   Diagnosis Date    Atrial fibrillation ([facility] )     Carrero's esophagus     last assessed: 1/23/2018    BRCA1 negative     BRCA2 negative     [facility] 2006    stage 1 (left), given adjuvant radiation with Arimidex x 5 years    Cancer [facility]) 2006    Left Breast, Lumpectomy    Cataract     last assessed: 3/11/2014    Dysplasia of toenail     last assessed: 8/29/2017    Esophageal reflux     GERD (gastroesophageal reflux disease)     Gross hematuria     last assessed: 2/19/2015    Hematuria     Hiatal hernia     History of radiation therapy     Hypertension     Irregular heart beat     AFIB    Mixed sensory-motor polyneuropathy     Neuropathy     Obesity     Pacemaker     Paroxysmal atrial fibrillation (HCC)     Peripheral neuropathy     Rectal bleeding     Restless leg syndrome     Shortness of breath     last assessed: 2016     Social History     Socioeconomic History    Marital status: /Civil Union     Spouse name: Not on file    Number of children: Not on file    Years of education: Not on file    Highest education level: Not on file   Occupational History    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      Comment: retired   Social Needs    Financial resource strain: Not on file    Food insecurity     Worry: Not on file     Inability: Not on file   [employer] needs     Medical: Not on file     Non-medical: Not on file   Tobacco Use    Smoking status: Former Smoker     Packs/day: 1 00     Years: 25 00     Pack years: 25 00     Types: Cigarettes     Last attempt to quit: 1980     Years since quittin 9    Smokeless tobacco: Never Used    Tobacco comment: Quit over 30 years ago; quit age 41   Substance and Sexual Activity    Alcohol use: Not Currently    Drug use: No    Sexual activity: Not on file   Lifestyle    Physical activity     Days per week: Not on file     Minutes per session: Not on file    Stress: Not on file   Relationships    Social connections     Talks on phone: Not on file     Gets together: Not on file     Attends Yazidism service: Not on file     Active member of club or organization: Not on file     Attends meetings of clubs or organizations: Not on file     Relationship status: Not on file    Intimate partner violence     Fear of current or ex partner: Not on file     Emotionally abused: Not on file     Physically abused: Not on file     Forced sexual activity: Not on file   Other Topics Concern    Not on file   Social History Narrative          Family History   Problem Relation Age of Onset    Hypertension Mother     Heart disease Father     Aneurysm Father     Coronary artery disease Father         in his 76s with aneurysm    Aortic aneurysm Father         abdominal    Scleroderma Sister     Breast cancer Sister 76    Hypertension Sister     Cancer Sister     No Known Problems Son     No Known Problems Son     Testicular cancer Son 39    Thyroid cancer Son 45    Colon cancer Maternal Aunt     Colon cancer Maternal Aunt     Breast cancer Other 48        kaylee's daughter    Alcohol abuse Neg Hx     Substance Abuse Neg Hx     Mental illness Neg Hx     Depression Neg Hx      Past Surgical History:   Procedure Laterality Date    BREAST BIOPSY Left 2006    BREAST LUMPECTOMY Left 2006    onset: 2006    BREAST SURGERY      CARDIAC PACEMAKER PLACEMENT      x 3 2006    CATARACT EXTRACTION      COLONOSCOPY      CYSTOSCOPY  04/04/2014    diagnostic    HYSTERECTOMY      ALISON BSO; due to fibroid uterus; age 38   Meier KNEE CARTILAGE SURGERY      excision lesion of meniscus or capsule knee    KNEE SURGERY      OOPHORECTOMY Bilateral     OTHER SURGICAL HISTORY      radiation therapy    DC COLONOSCOPY FLX DX W/COLLJ SPEC WHEN PFRMD N/A 2/8/2017    Procedure: COLONOSCOPY;  Surgeon: Celine Kelsey MD;  [facility]; Service: Gastroenterology    DC ESOPHAGOGASTRODUODENOSCOPY TRANSORAL DIAGNOSTIC N/A 9/20/2017    Procedure: ESOPHAGOGASTRODUODENOSCOPY (EGD); Surgeon: Kamran Gomez MD;  Location: [facility];   Service: Gastroenterology    UPPER GASTROINTESTINAL ENDOSCOPY         Current Outpatient Medications:     Acetaminophen (TYLENOL ARTHRITIS PAIN PO), Take 650 mg by mouth daily , Disp: , Rfl:     Ascorbic Acid (Vitamin C ER) 500 MG CPCR, TAKE 1 CAPSULE BY MOUTH TWICE A DAY, Disp: 60 capsule, Rfl: 0    budesonide (PULMICORT) 90 MCG/ACT inhaler, Inhale 1-2 puffs 2 (two) times a day, Disp: 1 Inhaler, Rfl: 0    Calcium Acetate, Phos Binder, (CALCIUM ACETATE PO), Take by mouth daily  , Disp: , Rfl:     Cholecalciferol (VITAMIN D3) 5000 units CAPS, Take by mouth daily , Disp: , Rfl:     clobetasol (TEMOVATE) 0 05 % ointment, Apply once weekly to the affected area, Disp: 30 g, Rfl: 3    ferrous sulfate 324 (65 Fe) mg, Take 1 tablet (324 mg total) by mouth daily before breakfast, Disp: 30 tablet, Rfl: 0    folic acid (FOLVITE) 1 mg tablet, Take 1 tablet (1 mg total) by mouth daily, Disp: 30 tablet, Rfl: 0    furosemide (LASIX) 40 mg tablet, Take 1 tablet (40 mg total) by mouth daily, Disp: 90 tablet, Rfl: 3    gabapentin (NEURONTIN) 800 mg tablet, Take 1 tablet (800 mg total) by mouth 4 (four) times a day, Disp: 360 tablet, Rfl: 1    loratadine (CLARITIN) 10 mg tablet, Take 10 mg by mouth daily, Disp: , Rfl:     metoprolol tartrate (LOPRESSOR) 50 mg tablet, Take 1 tablet (50 mg total) by mouth 2 (two) times a day, Disp: 180 tablet, Rfl: 0    multivitamin (THERAGRAN) TABS, Take 1 tablet by mouth daily, Disp: , Rfl:     pramipexole (MIRAPEX) 0 5 mg tablet, One and half tablet at 5:00 p m  and 10:00 p m , Disp: 360 tablet, Rfl: 1    Probiotic Product (PROBIOTIC PO), Take by mouth daily , Disp: , Rfl:     warfarin (COUMADIN) 7 5 mg tablet, Take 1 tablet (7 5 mg total) by mouth daily (Patient not taking: Reported on 8/10/2020), Disp: 90 tablet, Rfl: 3  Allergies   Allergen Reactions    Cephalexin Rash    Duloxetine Hcl Other (See Comments)     Facial pins and needles sensation    Erythromycin Rash    Levofloxacin Other (See Comments)     Muscular aches    Penicillins Rash    Savella [Milnacipran] Rash    Sulfa Antibiotics Rash       Labs:     Chemistry        Component Value Date/Time     11/09/2015 1115    K 4 5 07/10/2020 0922    K 4 5 11/09/2015 1115     07/10/2020 0922     11/09/2015 1115    CO2 28 07/10/2020 0922    CO2 30 (H) 11/09/2015 1115    BUN 35 (H) 07/10/2020 0922    BUN 20 11/09/2015 1115    CREATININE 0 91 07/10/2020 0922    CREATININE 0 8 11/09/2015 1115        Component Value Date/Time    CALCIUM 9 5 07/10/2020 0922    CALCIUM 9 0 11/09/2015 1115    ALKPHOS 77 07/10/2020 0922    ALKPHOS 61 11/09/2015 1115    AST 25 07/10/2020 0922    AST 23 11/09/2015 1115    ALT 31 07/10/2020 0922    ALT 31 11/09/2015 1115    BILITOT 0 5 11/09/2015 1115            Lab Results   Component Value Date    CHOL 162 11/09/2015     Lab Results   Component Value Date    HDL 35 (L) 11/14/2019    HDL 41 03/05/2019    HDL 49 11/28/2018     Lab Results   Component Value Date    LDLCALC 80 11/14/2019    LDLCALC 105 (H) 03/05/2019    LDLCALC 124 (H) 11/28/2018     Lab Results   Component Value Date    TRIG 127 11/14/2019    TRIG 119 03/05/2019    TRIG 91 11/28/2018     No results found for: CHOLHDL    Imaging: No results found  EKG: Atrial Fibrillation     Review of Systems   Constitution: Negative  HENT: Negative  Eyes: Negative  Cardiovascular: Negative  Respiratory: Negative  Endocrine: Negative  Hematologic/Lymphatic: Negative  Skin: Negative  Musculoskeletal: Positive for arthritis  Gastrointestinal: Negative  Genitourinary: Negative  Neurological: Negative  Psychiatric/Behavioral: Negative  Allergic/Immunologic: Negative  Vitals:    08/10/20 1116   BP: 122/80   Pulse: 92   SpO2: 97%           Physical Exam  Vitals signs reviewed  Constitutional:       General: She is not in acute distress  Appearance: She is well-developed  She is not ill-appearing or diaphoretic  HENT:      Head: Normocephalic  Mouth/Throat:      Pharynx: No pharyngeal swelling or oropharyngeal exudate  Eyes:      Extraocular Movements: Extraocular movements intact  Neck:      Musculoskeletal: Normal range of motion  Vascular: No JVD  Cardiovascular:      Rate and Rhythm: Normal rate  Rhythm irregular  Heart sounds: Murmur present  Pulmonary:      Effort: Pulmonary effort is normal  No tachypnea or accessory muscle usage  Breath sounds: Normal breath sounds  No decreased breath sounds or wheezing     Abdominal:      General: Bowel sounds are normal       Palpations: Abdomen is soft  There is no hepatomegaly or mass  Musculoskeletal: Normal range of motion  Right lower leg: No edema  Left lower leg: She exhibits no tenderness  No edema  Skin:     General: Skin is warm and dry  Coloration: Skin is not cyanotic or pale  Neurological:      General: No focal deficit present  Mental Status: She is alert  She is disoriented  Cranial Nerves: No cranial nerve deficit  Psychiatric:         Mood and Affect: Mood normal  Mood is not anxious  Discussion/Summary:    1  Chronic Atrial Fibrillation: Overall doing well  Continue Metoprolol  Stable on Coumadin  She is stable for upcoming surgery       2  Hypertension: Her BP is well controlled  Continue current medical therapy   lisinopril was discontinued       3  s/p PPM: Continue device checks  Device checks were reviewed        4  Mild Aortic Stenosis: Will continue to follow           The patient was counseled regarding diagnostic results, instructions for management, risk factor reductions, impressions  total time of encounter was 25 minutes and 15 minutes was spent counseling

## 2020-08-11 ENCOUNTER — TELEPHONE (OUTPATIENT)
Dept: OBGYN CLINIC | Facility: HOSPITAL | Age: 80
End: 2020-08-11

## 2020-08-11 ENCOUNTER — EVALUATION (OUTPATIENT)
Dept: PHYSICAL THERAPY | Facility: CLINIC | Age: 80
End: 2020-08-11
Payer: MEDICARE

## 2020-08-11 DIAGNOSIS — Z20.822 COVID-19 RULED OUT BY LABORATORY TESTING: ICD-10-CM

## 2020-08-11 DIAGNOSIS — M17.0 PRIMARY OSTEOARTHRITIS OF BOTH KNEES: Primary | ICD-10-CM

## 2020-08-11 DIAGNOSIS — G89.29 CHRONIC PAIN OF LEFT KNEE: ICD-10-CM

## 2020-08-11 DIAGNOSIS — Z01.812 PRE-OPERATIVE LABORATORY EXAMINATION: ICD-10-CM

## 2020-08-11 DIAGNOSIS — M25.562 CHRONIC PAIN OF LEFT KNEE: ICD-10-CM

## 2020-08-11 PROCEDURE — U0003 INFECTIOUS AGENT DETECTION BY NUCLEIC ACID (DNA OR RNA); SEVERE ACUTE RESPIRATORY SYNDROME CORONAVIRUS 2 (SARS-COV-2) (CORONAVIRUS DISEASE [COVID-19]), AMPLIFIED PROBE TECHNIQUE, MAKING USE OF HIGH THROUGHPUT TECHNOLOGIES AS DESCRIBED BY CMS-2020-01-R: HCPCS | Performed by: ORTHOPAEDIC SURGERY

## 2020-08-11 PROCEDURE — 97161 PT EVAL LOW COMPLEX 20 MIN: CPT

## 2020-08-11 PROCEDURE — 97112 NEUROMUSCULAR REEDUCATION: CPT

## 2020-08-11 NOTE — PROGRESS NOTES
PT Evaluation     Today's date: 2020  Patient name: Cristal Marquez  : 1940  MRN: [id number]  Referring provider: Magy Lentz  Dx:   Encounter Diagnosis     ICD-10-CM    1  Primary osteoarthritis of both knees  M17 0 Ambulatory referral to Physical Therapy   2  Chronic pain of left knee  M25 562 Ambulatory referral to Physical Therapy    G89 29    3  Pre-operative laboratory examination  Z01 812 Ambulatory referral to Physical Therapy       Start Time: 1104  Stop Time: 1145  Total time in clinic (min): 41 minutes    Assessment  Assessment details: Pt is a pleasant 70 y o  female who presents to [place] [id number] [facility] for pre-op of L TKA prior to surgery on 20  Today, she presents with expected limitations of decreased L knee ROM and joint mobility, decreased B LE and core strength, high self reports of pain, and decreased tolerance to activity  Functionally, she is limited in her ability to stand and ambulate, negotiate stairs, perform age appropriate recreation and exercise, and perform normal household duties  She is very motivated to improve  Pt will benefit from skilled PT to address the aforementioned deficits and limitations in an effort to maximize pain free functional mobility and overall quality of life  Progress as able with these goals in mind  Impairments: abnormal gait, abnormal muscle firing, abnormal muscle tone, abnormal or restricted ROM, abnormal movement, activity intolerance, impaired physical strength, lacks appropriate home exercise program and pain with function  Understanding of Dx/Px/POC: good   Prognosis: good    Goals  Short term goals (3 weeks from post op visit):  1) Pt will improve post-op L knee A ROM to 0-115 pain free  2) Pt will improve B LE and core strength deficits by 1/3 grade MMT  3) Pt will reports pain at worse <5/10  4) Pt will initiate and progress HEP w/ special emphasis on functional L knee ROM and B LE/core strength      Long term goals (6 weeks)  1) Pt will improve FOTO to at least 20 points higher than initial post op score  2) Pt will stand and ambulate for durations of 20 min w/ normalized gait mechanics and no pain  3) Pt will perform full week of functional activities to include stair negotiation, home activities, and recreation w/ L knee pain <4/10  4) Pt will be independent and compliant w/ HEP in order to maximize functional benefit of skilled PT following d/c        Plan  Plan details: POC to include: TKA program    HEP to start: see scanned doc    Patient would benefit from: skilled PT  Planned modality interventions: cryotherapy and thermotherapy: hydrocollator packs  Planned therapy interventions: abdominal trunk stabilization, activity modification, joint mobilization, manual therapy, massage, motor coordination training, neuromuscular re-education, patient education, postural training, stretching, strengthening, therapeutic activities, therapeutic exercise, therapeutic training, transfer training, home exercise program, gait training, graded activity, functional ROM exercises, graded exercise, flexibility, body mechanics training, balance and balance/weight bearing training  Frequency: 2x week  Duration in visits: 12  Duration in weeks: 6  Treatment plan discussed with: patient        Subjective Evaluation    History of Present Illness  Mechanism of injury: Pt is here today for pre-op prior to L TKA on 8/17/20  Pt has had L knee pain for the last 5 years  Notes that she fell onto that side multiple times in the last 4-5 years  Notes that R knee hurts as well but L is worse  Main goal is to be able to walk and and move around with family and friends pain free  Pt notes that sleeping is not a problem  Stairs and prolonged WB are very difficult and painful  She is very excited to get through surgery and get back to PT  Lives w/    Functional status below:  Quality of life: good    Pain  Current pain rating: 3  At best pain ratin  At worst pain ratin  Location: L knee medial>lateral  Quality: dull ache, sharp and throbbing  Relieving factors: rest, medications and change in position  Aggravating factors: standing, walking, stair climbing, lifting and running  Progression: worsening    Social Support  Steps to enter house: yes  Stairs in house: yes   Lives in: multiple-level home  Lives with: spouse    Employment status: not working (retired)  Patient Goals  Patient goals for therapy: increased strength, decreased pain, increased motion and return to sport/leisure activities  Patient goal: be ablw to walking without pain or limp! Get back to going out with friends without pain! Objective     Palpation     Additional Palpation Details  Pt is TTP along medial joint line>lateral joint line of L TF joint     Neurological Testing     Sensation     Knee   Left Knee   Intact: light touch    Right Knee   Intact: light touch     Active Range of Motion   Left Knee   Flexion: 120 degrees   Extension: 0 degrees     Right Knee   Flexion: 125 degrees   Extension: 0 degrees     Passive Range of Motion   Left Knee   Flexion: 122 degrees with pain  Extension: 0 degrees     Right Knee   Flexion: 130 degrees   Extension: 0 degrees     Strength/Myotome Testing     Left Knee   Flexion: 3+  Extension: 3+    Right Knee   Flexion: 4-  Extension: 4-    Additional Strength Details  Hips grossly 4-/5 throughout    Pt shows fair quad contraction on L, good on R    SLR on L x 5 before loss of TKE    Tests     Additional Tests Details  Passive SLR to ~65-70 deg B before significant stretch     Ambulation     Ambulation: Level Surfaces     Additional Level Surfaces Ambulation Details  Poor WB on L LE, decreased lumbar rotation, min antalgic w/ increased time to complete, fatigues quickly     Functional Assessment        Comments  Sit<>stand: requires UE support on LE and R sided WS throughout       BW squat: unable     Stairs: TBA    SLS:  Unable on L side today             Precautions: B diabetic neuropathy, DM II    Date (Visit #) 8/11 (1) pre-op       Manual              DTM and TPR                                                                        Exercise Diary              Ther Ex        Active w/u             PROM             HS/glute/piri stretch             QS, SLR, hip abd                             Neuro Re Ed        bridging             TrA progressions             Squat training             Hip Abd Progressions             Balance                         Ther Act             stairs             gait             Sit<>stand                                                                                                                  Modalities             heat             Ice

## 2020-08-11 NOTE — TELEPHONE ENCOUNTER
Pt reporting she is now out of her prescribed folic acid 1mg  She reports she took the last dose today    Pt wants to ask surgeon if refill is necessary, sending to surgeon's team

## 2020-08-12 ENCOUNTER — ANESTHESIA (OUTPATIENT)
Dept: GASTROENTEROLOGY | Facility: AMBULARY SURGERY CENTER | Age: 80
End: 2020-08-12

## 2020-08-12 ENCOUNTER — HOSPITAL ENCOUNTER (OUTPATIENT)
Dept: GASTROENTEROLOGY | Facility: AMBULARY SURGERY CENTER | Age: 80
Setting detail: OUTPATIENT SURGERY
Discharge: HOME | End: 2020-08-12
Attending: INTERNAL MEDICINE | Admitting: INTERNAL MEDICINE
Payer: MEDICARE

## 2020-08-12 ENCOUNTER — TELEPHONE (OUTPATIENT)
Dept: GASTROENTEROLOGY | Facility: AMBULARY SURGERY CENTER | Age: 80
End: 2020-08-12

## 2020-08-12 ENCOUNTER — ANESTHESIA EVENT (OUTPATIENT)
Dept: GASTROENTEROLOGY | Facility: AMBULARY SURGERY CENTER | Age: 80
End: 2020-08-12

## 2020-08-12 VITALS
BODY MASS INDEX: 38.24 KG/M2 | SYSTOLIC BLOOD PRESSURE: 105 MMHG | OXYGEN SATURATION: 95 % | DIASTOLIC BLOOD PRESSURE: 59 MMHG | RESPIRATION RATE: 16 BRPM | WEIGHT: 224 LBS | HEIGHT: 64 IN | TEMPERATURE: 96.6 F | HEART RATE: 74 BPM

## 2020-08-12 DIAGNOSIS — K21.9 GASTROESOPHAGEAL REFLUX DISEASE WITHOUT ESOPHAGITIS: ICD-10-CM

## 2020-08-12 DIAGNOSIS — K21.9 GASTROESOPHAGEAL REFLUX DISEASE, ESOPHAGITIS PRESENCE NOT SPECIFIED: ICD-10-CM

## 2020-08-12 DIAGNOSIS — K63.5 POLYP OF COLON, UNSPECIFIED PART OF COLON, UNSPECIFIED TYPE: ICD-10-CM

## 2020-08-12 LAB — SARS-COV-2 RNA SPEC QL NAA+PROBE: NOT DETECTED

## 2020-08-12 PROCEDURE — 88305 TISSUE EXAM BY PATHOLOGIST: CPT | Performed by: PATHOLOGY

## 2020-08-12 PROCEDURE — 43239 EGD BIOPSY SINGLE/MULTIPLE: CPT | Performed by: INTERNAL MEDICINE

## 2020-08-12 PROCEDURE — 45380 COLONOSCOPY AND BIOPSY: CPT | Performed by: INTERNAL MEDICINE

## 2020-08-12 PROCEDURE — NC001 PR NO CHARGE: Performed by: INTERNAL MEDICINE

## 2020-08-12 RX ORDER — SODIUM CHLORIDE, SODIUM LACTATE, POTASSIUM CHLORIDE, CALCIUM CHLORIDE 600; 310; 30; 20 MG/100ML; MG/100ML; MG/100ML; MG/100ML
125 INJECTION, SOLUTION INTRAVENOUS CONTINUOUS
Status: DISCONTINUED | OUTPATIENT
Start: 2020-08-12 | End: 2020-08-16 | Stop reason: HOSPADM

## 2020-08-12 RX ORDER — PROPOFOL 10 MG/ML
INJECTION, EMULSION INTRAVENOUS AS NEEDED
Status: DISCONTINUED | OUTPATIENT
Start: 2020-08-12 | End: 2020-08-12

## 2020-08-12 RX ORDER — FAMOTIDINE 40 MG/1
40 TABLET, FILM COATED ORAL DAILY
Qty: 30 TABLET | Refills: 3 | Status: SHIPPED | OUTPATIENT
Start: 2020-08-12 | End: 2020-11-05

## 2020-08-12 RX ORDER — PROPOFOL 10 MG/ML
INJECTION, EMULSION INTRAVENOUS CONTINUOUS PRN
Status: DISCONTINUED | OUTPATIENT
Start: 2020-08-12 | End: 2020-08-12

## 2020-08-12 RX ORDER — LIDOCAINE HYDROCHLORIDE 10 MG/ML
INJECTION, SOLUTION EPIDURAL; INFILTRATION; INTRACAUDAL; PERINEURAL AS NEEDED
Status: DISCONTINUED | OUTPATIENT
Start: 2020-08-12 | End: 2020-08-12

## 2020-08-12 RX ADMIN — PROPOFOL 50 MG: 10 INJECTION, EMULSION INTRAVENOUS at 09:29

## 2020-08-12 RX ADMIN — PROPOFOL 30 MG: 10 INJECTION, EMULSION INTRAVENOUS at 09:32

## 2020-08-12 RX ADMIN — LIDOCAINE HYDROCHLORIDE 50 MG: 10 INJECTION, SOLUTION EPIDURAL; INFILTRATION; INTRACAUDAL; PERINEURAL at 09:29

## 2020-08-12 RX ADMIN — PROPOFOL 50 MG: 10 INJECTION, EMULSION INTRAVENOUS at 09:30

## 2020-08-12 RX ADMIN — SODIUM CHLORIDE, SODIUM LACTATE, POTASSIUM CHLORIDE, AND CALCIUM CHLORIDE: .6; .31; .03; .02 INJECTION, SOLUTION INTRAVENOUS at 09:22

## 2020-08-12 RX ADMIN — PROPOFOL 160 MCG/KG/MIN: 10 INJECTION, EMULSION INTRAVENOUS at 09:29

## 2020-08-12 NOTE — ANESTHESIA PREPROCEDURE EVALUATION
Procedure:  COLONOSCOPY  EGD    Relevant Problems   CARDIO   (+) Atrial fibrillation (HCC) [I48 91]   (+) Essential hypertension      GI/HEPATIC   (+) GERD (gastroesophageal reflux disease)   (+) Hiatal hernia      MUSCULOSKELETAL   (+) Arthritis   (+) Osteoarthritis of left knee      NEURO/PSYCH   (+) Diabetic polyneuropathy associated with type 2 diabetes mellitus (HCC)        Physical Exam    Airway    Mallampati score: II  TM Distance: >3 FB  Neck ROM: full     Dental   No notable dental hx     Cardiovascular  Cardiovascular exam normal    Pulmonary  Pulmonary exam normal     Other Findings        Anesthesia Plan  ASA Score- 3     Anesthesia Type- IV sedation with anesthesia with ASA Monitors  Additional Monitors:   Airway Plan:           Plan Factors-Exercise tolerance (METS): >4 METS  Chart reviewed  EKG reviewed  Imaging results reviewed  Existing labs reviewed  Patient summary reviewed  Induction-     Postoperative Plan-     Informed Consent- Anesthetic plan and risks discussed with patient  I personally reviewed this patient with the CRNA  Discussed and agreed on the Anesthesia Plan with the CRNA  Rhoda Machuca

## 2020-08-12 NOTE — ANESTHESIA POSTPROCEDURE EVALUATION
Post-Op Assessment Note    CV Status:  Stable    Pain management: adequate     Mental Status:  Alert and awake   Hydration Status:  Euvolemic   PONV Controlled:  Controlled   Airway Patency:  Patent      Post Op Vitals Reviewed: Yes            No complications documented      BP      Temp     Pulse     Resp      SpO2

## 2020-08-12 NOTE — H&P
History and Physical - [facility]  Susan Edwards 70 y o  female MRN: [id number]    HPI: Susan Edwards is a 70y o  year old female who presents for evaluation of GERD and colon cancer screening  Review of Systems    Historical Information   Past Medical History:   Diagnosis Date    [facility]     Carrero's esophagus     last assessed: 1/23/2018    BRCA1 negative     BRCA2 negative     Breast cancer ([facility] ) 2006    stage 1 (left), given adjuvant radiation with Arimidex x 5 years    Cancer [facility]) 2006    Left Breast, Lumpectomy    Cataract     last assessed: 3/11/2014    Dysplasia of toenail     last assessed: 8/29/2017    Esophageal reflux     GERD (gastroesophageal reflux disease)     Gross hematuria     last assessed: 2/19/2015    Hematuria     Hiatal hernia     History of radiation therapy     Hypertension     Irregular heart beat     AFIB    Mixed sensory-motor polyneuropathy     Neuropathy     Obesity     Pacemaker     Paroxysmal atrial fibrillation (HCC)     Peripheral neuropathy     Rectal bleeding     Restless leg syndrome     Shortness of breath     last assessed: 1/11/2016     Past Surgical History:   Procedure Laterality Date    BREAST BIOPSY Left 2006    BREAST LUMPECTOMY Left 2006    onset: 2006    BREAST SURGERY      CARDIAC PACEMAKER PLACEMENT      x 3 2006    CATARACT EXTRACTION      COLONOSCOPY      CYSTOSCOPY  04/04/2014    diagnostic    HYSTERECTOMY      ALISON BSO; due to fibroid uterus; age 38   [employer] KNEE CARTILAGE SURGERY      excision lesion of meniscus or capsule knee    KNEE SURGERY      OOPHORECTOMY Bilateral     OTHER SURGICAL HISTORY      radiation therapy    TN COLONOSCOPY FLX DX W/COLLJ SPEC WHEN PFRMD N/A 2/8/2017    Procedure: COLONOSCOPY;  Surgeon: Pravin Lara MD;  Location: [facility];   Service: Gastroenterology    TN ESOPHAGOGASTRODUODENOSCOPY TRANSORAL DIAGNOSTIC N/A 9/20/2017    Procedure: ESOPHAGOGASTRODUODENOSCOPY (EGD); Surgeon: French Marques MD;  Location: [facility];   Service: Gastroenterology    UPPER GASTROINTESTINAL ENDOSCOPY       Social History   Social History     Substance and Sexual Activity   Alcohol Use Not Currently     Social History     Substance and Sexual Activity   Drug Use No     Social History     Tobacco Use   Smoking Status Former Smoker    Packs/day: 1 00    Years: 25 00    Pack years: 25 00    Types: Cigarettes    Last attempt to quit: 1980    Years since quittin 9   Smokeless Tobacco Never Used   Tobacco Comment    Quit over 27 years ago; quit age 41     Family History   Problem Relation Age of Onset    Hypertension Mother     Heart disease Father     Aneurysm Father     Coronary artery disease Father         in his 76s with aneurysm    Aortic aneurysm Father         abdominal    Scleroderma Sister     Breast cancer Sister 76    Hypertension Sister     Cancer Sister     No Known Problems Son     No Known Problems Son     Testicular cancer Son 39    Thyroid cancer Son 45    Colon cancer Maternal Aunt     Colon cancer Maternal Aunt     Breast cancer Other 48        kaylee's daughter    Alcohol abuse Neg Hx     Substance Abuse Neg Hx     Mental illness Neg Hx     Depression Neg Hx        Meds/Allergies     (Not in a hospital admission)      Allergies   Allergen Reactions    Cephalexin Rash    Duloxetine Hcl Other (See Comments)     Facial pins and needles sensation    Erythromycin Rash    Levofloxacin Other (See Comments)     Muscular aches    Penicillins Rash    Savella [Milnacipran] Rash    Sulfa Antibiotics Rash       Objective     /84   Pulse 87   Temp (!) 96 6 °F (35 9 °C) (Temporal)   Resp 20   Ht 5' 4" (1 626 m)   Wt 102 kg (224 lb)   SpO2 96%   BMI 38 45 kg/m²       PHYSICAL EXAM    Gen: NAD  CV: RRR  CHEST: Clear  ABD: soft, NT/ND  EXT: no edema  Neuro: AAO      ASSESSMENT/PLAN:  This is a 70y o  year old female here for evaluation of GERD and colon cancer screening    PLAN:   Procedure:  EGD and colonoscopy  Coumadin has been held for 5 days

## 2020-08-14 ENCOUNTER — TELEPHONE (OUTPATIENT)
Dept: OBGYN CLINIC | Facility: CLINIC | Age: 80
End: 2020-08-14

## 2020-08-14 ENCOUNTER — OFFICE VISIT (OUTPATIENT)
Dept: PODIATRY | Facility: CLINIC | Age: 80
End: 2020-08-14
Payer: MEDICARE

## 2020-08-14 VITALS — HEIGHT: 64 IN | WEIGHT: 224 LBS | BODY MASS INDEX: 38.24 KG/M2 | HEART RATE: 74 BPM | RESPIRATION RATE: 17 BRPM

## 2020-08-14 DIAGNOSIS — E11.51 TYPE 2 DIABETES MELLITUS WITH DIABETIC PERIPHERAL ANGIOPATHY WITHOUT GANGRENE, WITHOUT LONG-TERM CURRENT USE OF INSULIN (HCC): Primary | ICD-10-CM

## 2020-08-14 DIAGNOSIS — M79.672 PAIN IN BOTH FEET: ICD-10-CM

## 2020-08-14 DIAGNOSIS — L84 CORNS: ICD-10-CM

## 2020-08-14 DIAGNOSIS — M79.671 PAIN IN BOTH FEET: ICD-10-CM

## 2020-08-14 DIAGNOSIS — I70.209 PERIPHERAL ARTERIOSCLEROSIS (HCC): ICD-10-CM

## 2020-08-14 PROCEDURE — 11056 PARNG/CUTG B9 HYPRKR LES 2-4: CPT | Performed by: PODIATRIST

## 2020-08-14 NOTE — TELEPHONE ENCOUNTER
Advised patient to monitor for now  Have surgery and if she has any further vaginal bleeding please call the office  Verbalized understanding

## 2020-08-14 NOTE — TELEPHONE ENCOUNTER
Colonoscopy on Wednesday 8/12/2020 in the morning  The night prior to colonoscopy she had some vaginal pressure  After colonoscopy on Wednesday afternoon she noticed bright blood on her pad like a period  She passed some dark blood clots, she spoke with Nurse from GI who told her to monitor  She feels like the blood was coming from vaginal area not rectum  She has not had any bleeding since  Colonoscopy showed 3 polyps, mild diverticulitis and small internal hemorrhoids  She is having total knee replacement on Monday  Routing to provider for advice

## 2020-08-14 NOTE — PROGRESS NOTES
Assessment/Plan:  Painful calluses   Diabetic neuropathy   Peripheral vascular disease   Chronic edema  Plan   Diabetic foot exam performed   All mycotic nails debrided   Plantar calluses debrided without pain or complication  Diagnoses and all orders for this visit:     Subjective  Patient is diabetic  She has pain with walking  She has pain with shoe wear  No history of trauma     Diabetic polyneuropathy associated with type 2 diabetes mellitus (HCC)     Corns     Pain in both feet     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HCC)     Chronic edema   Rule out deep venous insufficiency       Discussion/Summary  The patient was counseled regarding instructions for management,-- prognosis,-- patient and family education,-- risks and benefits of treatment options,-- importance of compliance with treatment  Patient is able to Self-Care  Possible side effects of new medications were reviewed with the patient/guardian today  The treatment plan was reviewed with the patient/guardian  The patient/guardian understands and agrees with the treatment plan      Chief Complaint  Patient has complaint of pain in her toes with ambulation  No history of trauma     History of Present Illness  HPI: Patient is doing well with Cymbalta however she began have facial tingling  She discontinued medicine   However the medication was relieving her neuropathic pain       Review of Systems     ROS reviewed       Active Problems     1  Achilles tendinitis of left lower extremity (726 71) (M76 62)   2  Acquired ankle/foot deformity (736 70) (M21 969)   3  AF (paroxysmal atrial fibrillation) (427 31) (I48 0)   4  Anticoagulant long-term use (V58 61) (Z79 01)   5  Arthritis (716 90) (M19 90)   6  At risk for bone density loss (V49 89) (Z91 89)   7  Atrial fibrillation (427 31) (I48 91)   8  History of Breast Cancer (V10 3)   9  Difficulty walking (719 7) (R26 2)   10  Encounter for screening mammogram for breast cancer (V76 12) (Z12 31)   11  Esophageal reflux (530 81) (K21 9)   12  Foot pain, bilateral (729 5) (M79 671,M79 672)   13  Hiatal hernia (553 3) (K44 9)   14  History of Carrero's esophagus (V12 79) (Z87 19)   15  History of fall (V15 88) (Z91 81)   16  Hypertension (401 9) (I10)   17  Leg pain, left (729 5) (H07 905)   18  Lichen sclerosus et atrophicus (701 0) (L90 0)   19  Lumbar radiculopathy (724 4) (M54 16)   20  Multiple lung nodules (793 19) (R91 8)   21  Obesity (278 00) (E66 9)   22  Onychomycosis (110 1) (B35 1)   23  Osteopenia (733 90) (M85 80)   24  Pacemaker (V45 01) (Z95 0)   25  Peripheral neuropathy (356 9) (G62 9)   26  Pes planus of both feet (734) (M21 41,M21 42)   27  Plantar fasciitis of left foot (728 71) (M72 2)   28  Restless legs syndrome (333 94) (G25 81)     Past Medical History   · History of Abnormal glucose (790 29) (R73 09)   · Atrial fibrillation (427 31) (I48 91)   · History of Breast Cancer (V10 3)   · 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)   · Esophageal reflux (530 81) (K21 9)   · Denied: History of Exposure To STD   · History of Gross hematuria (599 71) (R31 0)   · History of Hematoma (924 9) (T14 8XXA)   · History of Hematoma of lower extremity, right, initial encounter (924 5) (S80 11XA)   · History of backache (V13 59) (Z87 39)   · History of Carrero's esophagus (V12 79) (Z87 19)   · History of cataract (V12 49) (Z86 69)   · History of chest pain (V13 89) (Z87 898)   · History of fall (V15 88) (Z91 81)   · History of hematuria (V13 09) (Z87 448)   · History of postmenopausal hormone replacement therapy (V87 49) (Z92 29)   · History of shortness of breath (V13 89) (I87 107)   · History of urinary incontinence (V13 09) (Z87 898)   · History of Neck pain (723 1) (M54 2)   · Normal delivery (650) (O80,Z37  9)   · History of Right knee pain (719 46) (M25 561)   · History of Ringing In The Ears (Tinnitus) (388 30)   · History of Skin rash (782 1) (R21)   · History of Traumatic blister of right lower extremity, initial encounter (916 2) (P91 023X)   · History of UTI (lower urinary tract infection) (599 0) (N39 0)     The active problems and past medical history were reviewed and updated today       Surgical History   · Denied: History of Abnormal Pap Smear Of Cervix   · History of Breast Surgery Lumpectomy   · History of Cataract Surgery   · History of Diagnostic Cystoscopy   · History of Excision Lesion Of Meniscus Or Capsule Knee   · History of Pacemaker - Pulse Generator Replacement   · History of Pacemaker Placement   · History of Pacemaker Placement   · History of Radiation Therapy   · History of Total Abdominal Hysterectomy With Removal Of Both Ovaries     The surgical history was reviewed and updated today        Family History  Mother    · Denied: Family history of Alcoholism and drug addiction in family   · Denied: Family history of Anxiety and depression  Father    · Denied: Family history of Alcoholism and drug addiction in family   · Denied: Family history of Anxiety and depression   · Family history of Coronary Artery Disease (V17 49)   · Family history of abdominal aortic aneurysm (V17 49) (Z82 49)  Child    · Denied: Family history of Alcoholism and drug addiction in family   · Denied: Family history of Anxiety and depression  Sibling    · Denied: Family history of Alcoholism and drug addiction in family   · Denied: Family history of Anxiety and depression  Sister    · Family history of Breast Cancer (V16 3)  Maternal Aunt    · Family history of Colon Cancer (V16 0)   · Family history of Colon Cancer (V16 0)  Family History    · Denied: Family history of Diabetes Mellitus   · Denied: Family history of Stroke Syndrome     The family history was reviewed and updated today        Social History      · Being A Social Drinker   · Denied: History of Drug Use   · Former smoker (V15 82) (P65 820)   · 25 pack years, quit age 41   · Marital History - Currently    · Retired From Work   · 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  The social history was reviewed and updated today       The medication list was reviewed and updated today        Allergies  1  duloxetine   2  LevoFLOXacin TABS   3  Cephalexin CAPS   4  Erythromycin Base TABS   5  Keflex TABS   6  Penicillins   7  Sulfa Drugs        Physical Exam  Left Foot: Appearance: Normal except as noted: excessive pronation-- and-- pes planus  Tenderness: None except the lisfranc joint-- and-- medial longitudinal arch  ROM: subtalar motion was restricted  Right Foot: Appearance: Normal except as noted: excessive pronation-- and-- pes planus  Tenderness: None except the lisfranc joint-- and-- medial longitudinal arch  ROM: subtalar motion was restricted   Left Ankle: ROM: limited ROM in all planes   Right Ankle: ROM: limited ROM in all planes   Neurological Exam: performed  Light touch was decreased bilaterally  Vibratory sensation was decreased in both first metatarsophalangeal joints  Deep tendon reflexes: achilles reflex absent bilateraly-- and-- 4/5 L5 testing bilateral    Vascular Exam: performed Dorsalis pedis pulses were diminished bilaterally  Posterior tibial pulses were diminished bilaterally  Dependence rubor was present bilaterally  Capillary refill time was Negative digital hair noted, but-- between 1-3 seconds bilaterally  Toenails: All of the toenails were elongated,-- hypertrophied,-- discolored-- and--   Hyperkeratosis: present on both first toes  Shoe Gear Evaluation: performed ()  Recommendation(s): SAS style       Patient's shoes and socks removed  Right Foot/Ankle   Right Foot Inspection        Sensory   Vibration: diminished  Proprioception: diminished      Vascular  Capillary refills: elevated        Left Foot/Ankle  Left Foot Inspection                    Sensory   Vibration: diminished  Proprioception: diminished     Vascular  Capillary refills: elevated     Patient's shoes and socks removed  Assign Risk Category:  Deformity present; Loss of protective sensation; Weak pulses       Risk: 2

## 2020-08-15 ENCOUNTER — ANESTHESIA EVENT (OUTPATIENT)
Dept: PERIOP | Facility: HOSPITAL | Age: 80
End: 2020-08-15
Payer: MEDICARE

## 2020-08-17 ENCOUNTER — HOSPITAL ENCOUNTER (OUTPATIENT)
Facility: HOSPITAL | Age: 80
Setting detail: OUTPATIENT SURGERY
LOS: 1 days | Discharge: HOME | End: 2020-08-18
Attending: ORTHOPAEDIC SURGERY | Admitting: ORTHOPAEDIC SURGERY
Payer: MEDICARE

## 2020-08-17 ENCOUNTER — ANESTHESIA (OUTPATIENT)
Dept: PERIOP | Facility: HOSPITAL | Age: 80
End: 2020-08-17
Payer: MEDICARE

## 2020-08-17 ENCOUNTER — APPOINTMENT (INPATIENT)
Dept: RADIOLOGY | Facility: HOSPITAL | Age: 80
End: 2020-08-17
Payer: MEDICARE

## 2020-08-17 DIAGNOSIS — Z96.652 STATUS POST TOTAL KNEE REPLACEMENT USING CEMENT, LEFT: Primary | ICD-10-CM

## 2020-08-17 DIAGNOSIS — Z20.822 COVID-19 RULED OUT BY LABORATORY TESTING: ICD-10-CM

## 2020-08-17 DIAGNOSIS — I48.19 PERSISTENT ATRIAL FIBRILLATION (HCC): ICD-10-CM

## 2020-08-17 LAB
ABO GROUP BLD: NORMAL
BLD GP AB SCN SERPL QL: NEGATIVE
INR PPP: 1.32 (ref 0.84–1.19)
PROTHROMBIN TIME: 16.3 SECONDS (ref 11.6–14.5)
RH BLD: POSITIVE
SPECIMEN EXPIRATION DATE: NORMAL

## 2020-08-17 PROCEDURE — C1776 JOINT DEVICE (IMPLANTABLE): HCPCS | Performed by: ORTHOPAEDIC SURGERY

## 2020-08-17 PROCEDURE — 97163 PT EVAL HIGH COMPLEX 45 MIN: CPT

## 2020-08-17 PROCEDURE — 85610 PROTHROMBIN TIME: CPT | Performed by: ORTHOPAEDIC SURGERY

## 2020-08-17 PROCEDURE — 73560 X-RAY EXAM OF KNEE 1 OR 2: CPT

## 2020-08-17 PROCEDURE — C1713 ANCHOR/SCREW BN/BN,TIS/BN: HCPCS | Performed by: ORTHOPAEDIC SURGERY

## 2020-08-17 PROCEDURE — 99024 POSTOP FOLLOW-UP VISIT: CPT | Performed by: ORTHOPAEDIC SURGERY

## 2020-08-17 PROCEDURE — C9290 INJ, BUPIVACAINE LIPOSOME: HCPCS | Performed by: ANESTHESIOLOGY

## 2020-08-17 PROCEDURE — 99222 1ST HOSP IP/OBS MODERATE 55: CPT | Performed by: INTERNAL MEDICINE

## 2020-08-17 PROCEDURE — 97110 THERAPEUTIC EXERCISES: CPT

## 2020-08-17 PROCEDURE — 86900 BLOOD TYPING SEROLOGIC ABO: CPT | Performed by: ORTHOPAEDIC SURGERY

## 2020-08-17 PROCEDURE — 86901 BLOOD TYPING SEROLOGIC RH(D): CPT | Performed by: ORTHOPAEDIC SURGERY

## 2020-08-17 PROCEDURE — G9196 MED REASON FOR NO CEPH: HCPCS | Performed by: ORTHOPAEDIC SURGERY

## 2020-08-17 PROCEDURE — 27447 TOTAL KNEE ARTHROPLASTY: CPT | Performed by: PHYSICIAN ASSISTANT

## 2020-08-17 PROCEDURE — 27447 TOTAL KNEE ARTHROPLASTY: CPT | Performed by: ORTHOPAEDIC SURGERY

## 2020-08-17 PROCEDURE — 86850 RBC ANTIBODY SCREEN: CPT | Performed by: ORTHOPAEDIC SURGERY

## 2020-08-17 DEVICE — ATTUNE KNEE SYSTEM FEMORAL CRUCIATE RETAINING NARROW SIZE 5N LEFT CEMENTED
Type: IMPLANTABLE DEVICE | Site: KNEE | Status: FUNCTIONAL
Brand: ATTUNE

## 2020-08-17 DEVICE — PALACOS® R IS A FAST-CURING, RADIOPAQUE, POLY(METHYL METHACRYLATE)-BASED BONE CEMENT.PALACOS ® R CONTAINS THE X-RAY CONTRAST MEDIUM ZIRCONIUM DIOXIDE. TO IMPROVE VISIBILITY IN THE SURGICAL FIELD PALACOS ® R HAS BEEN COLOURED WITH CHLOROPHYLL (E141). THE BONE CEMENT IS PREPARED DIRECTLY BEFORE USE BY MIXING A POLYMER POWDER COMPONENT WITH A LIQUID MONOMER COMPONENT. A DUCTILE DOUGH FORMS WHICH CURES WITHIN A FEW MINUTES.
Type: IMPLANTABLE DEVICE | Site: KNEE | Status: FUNCTIONAL
Brand: PALACOS®

## 2020-08-17 DEVICE — ATTUNE KNEE SYSTEM TIBIAL BASE FIXED BEARING SIZE 4 CEMENTED
Type: IMPLANTABLE DEVICE | Site: KNEE | Status: FUNCTIONAL
Brand: ATTUNE

## 2020-08-17 DEVICE — ATTUNE KNEE SYSTEM TIBIAL INSERT FIXED BEARING CRUCIATE RETAINING 5 10MM AOX
Type: IMPLANTABLE DEVICE | Site: KNEE | Status: FUNCTIONAL
Brand: ATTUNE

## 2020-08-17 DEVICE — ATTUNE PATELLA MEDIALIZED DOME 32MM CEMENTED AOX
Type: IMPLANTABLE DEVICE | Site: KNEE | Status: FUNCTIONAL
Brand: ATTUNE

## 2020-08-17 RX ORDER — GABAPENTIN 300 MG/1
300 CAPSULE ORAL ONCE
Status: DISCONTINUED | OUTPATIENT
Start: 2020-08-17 | End: 2020-08-17 | Stop reason: HOSPADM

## 2020-08-17 RX ORDER — BUPIVACAINE HYDROCHLORIDE 2.5 MG/ML
INJECTION, SOLUTION EPIDURAL; INFILTRATION; INTRACAUDAL AS NEEDED
Status: DISCONTINUED | OUTPATIENT
Start: 2020-08-17 | End: 2020-08-17 | Stop reason: HOSPADM

## 2020-08-17 RX ORDER — CHLORHEXIDINE GLUCONATE 0.12 MG/ML
15 RINSE ORAL ONCE
Status: COMPLETED | OUTPATIENT
Start: 2020-08-17 | End: 2020-08-17

## 2020-08-17 RX ORDER — FUROSEMIDE 40 MG/1
40 TABLET ORAL DAILY
Status: DISCONTINUED | OUTPATIENT
Start: 2020-08-17 | End: 2020-08-18 | Stop reason: HOSPADM

## 2020-08-17 RX ORDER — MAGNESIUM HYDROXIDE/ALUMINUM HYDROXICE/SIMETHICONE 120; 1200; 1200 MG/30ML; MG/30ML; MG/30ML
30 SUSPENSION ORAL EVERY 6 HOURS PRN
Status: DISCONTINUED | OUTPATIENT
Start: 2020-08-17 | End: 2020-08-18 | Stop reason: HOSPADM

## 2020-08-17 RX ORDER — MAGNESIUM HYDROXIDE 1200 MG/15ML
LIQUID ORAL AS NEEDED
Status: DISCONTINUED | OUTPATIENT
Start: 2020-08-17 | End: 2020-08-17 | Stop reason: HOSPADM

## 2020-08-17 RX ORDER — METOPROLOL TARTRATE 50 MG/1
50 TABLET, FILM COATED ORAL 2 TIMES DAILY
Status: DISCONTINUED | OUTPATIENT
Start: 2020-08-17 | End: 2020-08-18 | Stop reason: HOSPADM

## 2020-08-17 RX ORDER — PROPOFOL 10 MG/ML
INJECTION, EMULSION INTRAVENOUS AS NEEDED
Status: DISCONTINUED | OUTPATIENT
Start: 2020-08-17 | End: 2020-08-17

## 2020-08-17 RX ORDER — SODIUM CHLORIDE 9 MG/ML
INJECTION, SOLUTION INTRAVENOUS AS NEEDED
Status: DISCONTINUED | OUTPATIENT
Start: 2020-08-17 | End: 2020-08-17 | Stop reason: HOSPADM

## 2020-08-17 RX ORDER — VANCOMYCIN HYDROCHLORIDE 1 G/200ML
1000 INJECTION, SOLUTION INTRAVENOUS EVERY 12 HOURS
Status: COMPLETED | OUTPATIENT
Start: 2020-08-17 | End: 2020-08-17

## 2020-08-17 RX ORDER — ONDANSETRON 2 MG/ML
4 INJECTION INTRAMUSCULAR; INTRAVENOUS EVERY 4 HOURS PRN
Status: DISCONTINUED | OUTPATIENT
Start: 2020-08-17 | End: 2020-08-18 | Stop reason: HOSPADM

## 2020-08-17 RX ORDER — PROPOFOL 10 MG/ML
INJECTION, EMULSION INTRAVENOUS CONTINUOUS PRN
Status: DISCONTINUED | OUTPATIENT
Start: 2020-08-17 | End: 2020-08-17

## 2020-08-17 RX ORDER — BUPIVACAINE HYDROCHLORIDE 7.5 MG/ML
INJECTION, SOLUTION INTRASPINAL AS NEEDED
Status: DISCONTINUED | OUTPATIENT
Start: 2020-08-17 | End: 2020-08-17

## 2020-08-17 RX ORDER — HYDROMORPHONE HCL/PF 1 MG/ML
0.5 SYRINGE (ML) INJECTION EVERY 2 HOUR PRN
Status: DISCONTINUED | OUTPATIENT
Start: 2020-08-17 | End: 2020-08-18 | Stop reason: HOSPADM

## 2020-08-17 RX ORDER — LIDOCAINE HYDROCHLORIDE 10 MG/ML
INJECTION, SOLUTION EPIDURAL; INFILTRATION; INTRACAUDAL; PERINEURAL AS NEEDED
Status: DISCONTINUED | OUTPATIENT
Start: 2020-08-17 | End: 2020-08-17

## 2020-08-17 RX ORDER — SODIUM CHLORIDE, SODIUM LACTATE, POTASSIUM CHLORIDE, CALCIUM CHLORIDE 600; 310; 30; 20 MG/100ML; MG/100ML; MG/100ML; MG/100ML
100 INJECTION, SOLUTION INTRAVENOUS CONTINUOUS
Status: ACTIVE | OUTPATIENT
Start: 2020-08-17 | End: 2020-08-17

## 2020-08-17 RX ORDER — SCOLOPAMINE TRANSDERMAL SYSTEM 1 MG/1
1 PATCH, EXTENDED RELEASE TRANSDERMAL ONCE
Status: DISCONTINUED | OUTPATIENT
Start: 2020-08-17 | End: 2020-08-17

## 2020-08-17 RX ORDER — PRAMIPEXOLE DIHYDROCHLORIDE 0.5 MG/1
0.5 TABLET ORAL 2 TIMES DAILY
Status: DISCONTINUED | OUTPATIENT
Start: 2020-08-17 | End: 2020-08-17

## 2020-08-17 RX ORDER — SODIUM CHLORIDE 9 MG/ML
75 INJECTION, SOLUTION INTRAVENOUS CONTINUOUS
Status: DISCONTINUED | OUTPATIENT
Start: 2020-08-17 | End: 2020-08-18 | Stop reason: ALTCHOICE

## 2020-08-17 RX ORDER — FENTANYL CITRATE 50 UG/ML
INJECTION, SOLUTION INTRAMUSCULAR; INTRAVENOUS AS NEEDED
Status: DISCONTINUED | OUTPATIENT
Start: 2020-08-17 | End: 2020-08-17

## 2020-08-17 RX ORDER — SENNOSIDES 8.6 MG
2 TABLET ORAL
Status: DISCONTINUED | OUTPATIENT
Start: 2020-08-17 | End: 2020-08-18 | Stop reason: HOSPADM

## 2020-08-17 RX ORDER — ONDANSETRON 2 MG/ML
4 INJECTION INTRAMUSCULAR; INTRAVENOUS ONCE AS NEEDED
Status: DISCONTINUED | OUTPATIENT
Start: 2020-08-17 | End: 2020-08-17 | Stop reason: HOSPADM

## 2020-08-17 RX ORDER — ACETAMINOPHEN 325 MG/1
975 TABLET ORAL EVERY 8 HOURS SCHEDULED
Status: DISCONTINUED | OUTPATIENT
Start: 2020-08-17 | End: 2020-08-18 | Stop reason: HOSPADM

## 2020-08-17 RX ORDER — BISACODYL 10 MG
10 SUPPOSITORY, RECTAL RECTAL DAILY PRN
Status: DISCONTINUED | OUTPATIENT
Start: 2020-08-17 | End: 2020-08-18 | Stop reason: HOSPADM

## 2020-08-17 RX ORDER — PRAMIPEXOLE DIHYDROCHLORIDE 0.5 MG/1
0.5 TABLET ORAL
Status: DISCONTINUED | OUTPATIENT
Start: 2020-08-17 | End: 2020-08-18 | Stop reason: HOSPADM

## 2020-08-17 RX ORDER — FENTANYL CITRATE/PF 50 MCG/ML
50 SYRINGE (ML) INJECTION
Status: DISCONTINUED | OUTPATIENT
Start: 2020-08-17 | End: 2020-08-17 | Stop reason: HOSPADM

## 2020-08-17 RX ORDER — ACETAMINOPHEN 325 MG/1
975 TABLET ORAL ONCE
Status: COMPLETED | OUTPATIENT
Start: 2020-08-17 | End: 2020-08-17

## 2020-08-17 RX ORDER — SODIUM CHLORIDE, SODIUM LACTATE, POTASSIUM CHLORIDE, CALCIUM CHLORIDE 600; 310; 30; 20 MG/100ML; MG/100ML; MG/100ML; MG/100ML
100 INJECTION, SOLUTION INTRAVENOUS CONTINUOUS
Status: DISCONTINUED | OUTPATIENT
Start: 2020-08-17 | End: 2020-08-17

## 2020-08-17 RX ORDER — FERROUS SULFATE 325(65) MG
325 TABLET ORAL
Status: DISCONTINUED | OUTPATIENT
Start: 2020-08-18 | End: 2020-08-18 | Stop reason: HOSPADM

## 2020-08-17 RX ORDER — GABAPENTIN 400 MG/1
800 CAPSULE ORAL 4 TIMES DAILY
Status: DISCONTINUED | OUTPATIENT
Start: 2020-08-17 | End: 2020-08-18 | Stop reason: HOSPADM

## 2020-08-17 RX ORDER — DOCUSATE SODIUM 100 MG/1
100 CAPSULE, LIQUID FILLED ORAL 2 TIMES DAILY
Status: DISCONTINUED | OUTPATIENT
Start: 2020-08-17 | End: 2020-08-18 | Stop reason: HOSPADM

## 2020-08-17 RX ORDER — CHLORHEXIDINE GLUCONATE 0.12 MG/ML
15 RINSE ORAL ONCE
Status: DISCONTINUED | OUTPATIENT
Start: 2020-08-17 | End: 2020-08-17 | Stop reason: SDUPTHER

## 2020-08-17 RX ORDER — OXYCODONE HCL 10 MG/1
10 TABLET, FILM COATED, EXTENDED RELEASE ORAL ONCE
Status: COMPLETED | OUTPATIENT
Start: 2020-08-17 | End: 2020-08-17

## 2020-08-17 RX ORDER — SODIUM CHLORIDE, SODIUM LACTATE, POTASSIUM CHLORIDE, CALCIUM CHLORIDE 600; 310; 30; 20 MG/100ML; MG/100ML; MG/100ML; MG/100ML
125 INJECTION, SOLUTION INTRAVENOUS CONTINUOUS
Status: DISCONTINUED | OUTPATIENT
Start: 2020-08-17 | End: 2020-08-17 | Stop reason: SDUPTHER

## 2020-08-17 RX ORDER — LORATADINE 10 MG/1
10 TABLET ORAL DAILY
Status: DISCONTINUED | OUTPATIENT
Start: 2020-08-17 | End: 2020-08-18 | Stop reason: HOSPADM

## 2020-08-17 RX ORDER — PANTOPRAZOLE SODIUM 40 MG/1
40 TABLET, DELAYED RELEASE ORAL
Status: DISCONTINUED | OUTPATIENT
Start: 2020-08-17 | End: 2020-08-18 | Stop reason: HOSPADM

## 2020-08-17 RX ORDER — OXYCODONE HYDROCHLORIDE 5 MG/1
5 TABLET ORAL EVERY 4 HOURS PRN
Status: DISCONTINUED | OUTPATIENT
Start: 2020-08-17 | End: 2020-08-18 | Stop reason: HOSPADM

## 2020-08-17 RX ORDER — SODIUM CHLORIDE, SODIUM LACTATE, POTASSIUM CHLORIDE, CALCIUM CHLORIDE 600; 310; 30; 20 MG/100ML; MG/100ML; MG/100ML; MG/100ML
INJECTION, SOLUTION INTRAVENOUS CONTINUOUS PRN
Status: DISCONTINUED | OUTPATIENT
Start: 2020-08-17 | End: 2020-08-17

## 2020-08-17 RX ORDER — FOLIC ACID 1 MG/1
1 TABLET ORAL DAILY
Status: DISCONTINUED | OUTPATIENT
Start: 2020-08-17 | End: 2020-08-18 | Stop reason: HOSPADM

## 2020-08-17 RX ORDER — DEXAMETHASONE SODIUM PHOSPHATE 4 MG/ML
10 INJECTION, SOLUTION INTRA-ARTICULAR; INTRALESIONAL; INTRAMUSCULAR; INTRAVENOUS; SOFT TISSUE ONCE
Status: COMPLETED | OUTPATIENT
Start: 2020-08-18 | End: 2020-08-18

## 2020-08-17 RX ORDER — BUPIVACAINE HYDROCHLORIDE 5 MG/ML
INJECTION, SOLUTION PERINEURAL
Status: DISCONTINUED | OUTPATIENT
Start: 2020-08-17 | End: 2020-08-17

## 2020-08-17 RX ADMIN — CHLORHEXIDINE GLUCONATE 0.12% ORAL RINSE 15 ML: 1.2 LIQUID ORAL at 06:21

## 2020-08-17 RX ADMIN — FENTANYL CITRATE 25 MCG: 50 INJECTION, SOLUTION INTRAMUSCULAR; INTRAVENOUS at 10:02

## 2020-08-17 RX ADMIN — LIDOCAINE HYDROCHLORIDE 50 MG: 10 INJECTION, SOLUTION EPIDURAL; INFILTRATION; INTRACAUDAL; PERINEURAL at 07:52

## 2020-08-17 RX ADMIN — OXYCODONE HYDROCHLORIDE 5 MG: 5 TABLET ORAL at 14:29

## 2020-08-17 RX ADMIN — VANCOMYCIN HYDROCHLORIDE 1500 MG: 10 INJECTION, POWDER, LYOPHILIZED, FOR SOLUTION INTRAVENOUS at 06:50

## 2020-08-17 RX ADMIN — FOLIC ACID 1 MG: 1 TABLET ORAL at 14:29

## 2020-08-17 RX ADMIN — PANTOPRAZOLE SODIUM 40 MG: 40 TABLET, DELAYED RELEASE ORAL at 14:29

## 2020-08-17 RX ADMIN — PROPOFOL 8 MCG/KG/MIN: 10 INJECTION, EMULSION INTRAVENOUS at 07:52

## 2020-08-17 RX ADMIN — PROPOFOL 30 MG: 10 INJECTION, EMULSION INTRAVENOUS at 07:57

## 2020-08-17 RX ADMIN — METOPROLOL TARTRATE 50 MG: 50 TABLET, FILM COATED ORAL at 14:29

## 2020-08-17 RX ADMIN — PRAMIPEXOLE DIHYDROCHLORIDE 0.5 MG: 0.5 TABLET ORAL at 14:29

## 2020-08-17 RX ADMIN — PROPOFOL 50 MG: 10 INJECTION, EMULSION INTRAVENOUS at 07:52

## 2020-08-17 RX ADMIN — DOCUSATE SODIUM 100 MG: 100 CAPSULE, LIQUID FILLED ORAL at 14:29

## 2020-08-17 RX ADMIN — SODIUM CHLORIDE 75 ML/HR: 0.9 INJECTION, SOLUTION INTRAVENOUS at 14:38

## 2020-08-17 RX ADMIN — GABAPENTIN 800 MG: 400 CAPSULE ORAL at 18:14

## 2020-08-17 RX ADMIN — ACETAMINOPHEN 975 MG: 325 TABLET, FILM COATED ORAL at 21:50

## 2020-08-17 RX ADMIN — ENOXAPARIN SODIUM 40 MG: 40 INJECTION SUBCUTANEOUS at 21:50

## 2020-08-17 RX ADMIN — METOPROLOL TARTRATE 50 MG: 50 TABLET, FILM COATED ORAL at 21:50

## 2020-08-17 RX ADMIN — GABAPENTIN 800 MG: 400 CAPSULE ORAL at 14:29

## 2020-08-17 RX ADMIN — PHENYLEPHRINE HYDROCHLORIDE 100 MCG: 10 INJECTION INTRAVENOUS at 09:48

## 2020-08-17 RX ADMIN — SODIUM CHLORIDE, SODIUM LACTATE, POTASSIUM CHLORIDE, AND CALCIUM CHLORIDE: .6; .31; .03; .02 INJECTION, SOLUTION INTRAVENOUS at 07:30

## 2020-08-17 RX ADMIN — BUPIVACAINE HYDROCHLORIDE 20 ML: 5 INJECTION, SOLUTION PERINEURAL at 11:40

## 2020-08-17 RX ADMIN — VANCOMYCIN HYDROCHLORIDE 1000 MG: 1 INJECTION, SOLUTION INTRAVENOUS at 18:14

## 2020-08-17 RX ADMIN — GABAPENTIN 800 MG: 400 CAPSULE ORAL at 23:46

## 2020-08-17 RX ADMIN — ACETAMINOPHEN 975 MG: 325 TABLET, FILM COATED ORAL at 14:29

## 2020-08-17 RX ADMIN — PHENYLEPHRINE HYDROCHLORIDE 100 MCG: 10 INJECTION INTRAVENOUS at 08:18

## 2020-08-17 RX ADMIN — PHENYLEPHRINE HYDROCHLORIDE 100 MCG: 10 INJECTION INTRAVENOUS at 08:35

## 2020-08-17 RX ADMIN — FUROSEMIDE 40 MG: 40 TABLET ORAL at 14:29

## 2020-08-17 RX ADMIN — LORATADINE 10 MG: 10 TABLET ORAL at 14:29

## 2020-08-17 RX ADMIN — ACETAMINOPHEN 975 MG: 325 TABLET, FILM COATED ORAL at 06:19

## 2020-08-17 RX ADMIN — BUPIVACAINE HYDROCHLORIDE IN DEXTROSE 1.6 ML: 7.5 INJECTION, SOLUTION SUBARACHNOID at 07:48

## 2020-08-17 RX ADMIN — FENTANYL CITRATE 25 MCG: 50 INJECTION, SOLUTION INTRAMUSCULAR; INTRAVENOUS at 08:11

## 2020-08-17 RX ADMIN — SODIUM CHLORIDE, SODIUM LACTATE, POTASSIUM CHLORIDE, AND CALCIUM CHLORIDE: .6; .31; .03; .02 INJECTION, SOLUTION INTRAVENOUS at 09:35

## 2020-08-17 RX ADMIN — TRANEXAMIC ACID 1000 ML: 1 INJECTION, SOLUTION INTRAVENOUS at 07:55

## 2020-08-17 RX ADMIN — DOCUSATE SODIUM 100 MG: 100 CAPSULE, LIQUID FILLED ORAL at 21:50

## 2020-08-17 RX ADMIN — OXYCODONE HYDROCHLORIDE 10 MG: 10 TABLET, FILM COATED, EXTENDED RELEASE ORAL at 06:22

## 2020-08-17 RX ADMIN — HYDROMORPHONE HYDROCHLORIDE 0.5 MG: 1 INJECTION, SOLUTION INTRAMUSCULAR; INTRAVENOUS; SUBCUTANEOUS at 15:38

## 2020-08-17 RX ADMIN — PHENYLEPHRINE HYDROCHLORIDE 100 MCG: 10 INJECTION INTRAVENOUS at 09:08

## 2020-08-17 RX ADMIN — SCOPALAMINE 1 PATCH: 1 PATCH, EXTENDED RELEASE TRANSDERMAL at 06:20

## 2020-08-17 RX ADMIN — PRAMIPEXOLE DIHYDROCHLORIDE 0.5 MG: 0.5 TABLET ORAL at 21:50

## 2020-08-17 NOTE — OP NOTE
OPERATIVE REPORT  PATIENT NAME: Morteza Cisneros    :  1940  MRN: [id number]  Pt Location: [facility] OR ROOM 03    SURGERY DATE: 2020    Surgeon(s) and Role:     * Yaima Banda DO - Primary     * Amador Sinclair PA-C - Assisting, no qualified resident was available an assistant was needed for patient positioning, prepping and draping, soft tissue retraction, protection of vital structures throughout the case, and to complete the case safely  Preop Diagnosis:  Primary osteoarthritis of left knee  Chronic pain of left knee [M25 562, G89 29]    Post-Op Diagnosis Codes:     * Primary osteoarthritis of left knee     * Chronic pain of left knee [M25 562, G89 29]    Procedure(s) (LRB):  ARTHROPLASTY KNEE TOTAL (Left)    Specimen(s):  * No specimens in log *    Estimated Blood Loss:   20 mL    Drains:  None  Urethral Catheter Latex 16 Fr  (Active)   Reasons to continue Urinary Catheter  Post-operative urological requirements 20 1019   Site Assessment Clean;Skin intact 20 1215   Collection Container Other (Comment) 20 1215   Securement Method Securing device (Describe) 20 1215   Output (mL) 350 mL 20 1215   Number of days: 0       Anesthesia Type:   Spinal with sedation, postoperative adductor canal block with Exparel    Intravenous fluids:  1500 cc    Antibiotics:  Vancomycin 1 5 g    Urine output:  Ledesma:  250 cc    Tourniquet time: 71 minutes at 300 mmHg    Implants:Depuy Attune size 5 narrow left CR femur, size 4 S+ tibia, size 10 AOX CR polyethylene, size 32 polyethylene patella    Operative Indications:  Primary osteoarthritis of left knee  Chronic pain of left knee [C42 546, G89 29]  Patient is a very pleasant 72-year-old female known to me for treatment of her bilateral knee osteoarthritis  Currently her left knee is more painful than the right  She had failed multiple forms conservative measures of treatment and ultimately her activity-related pain persisted    With failure of conservative treatment, persistent activity-related pain, and end-stage osteoarthritis on x-rays I recommended she undergo left total knee arthroplasty  She was agreeable to this  The risks and benefits of undergoing surgery were discussed at length and consents were signed and placed in the chart  The patient was seen and cleared by her medical subspecialist and deemed optimized for the intended procedure  Patient underwent left total knee arthroplasty on 8/17/2020  Operative Findings:  After completion of the arthrotomy there was evidence of grade 4 changes in all 3 compartments  Most significantly affected was the medial compartment  Ultimately a cemented left total knee arthroplasty was successfully implanted without complication  Range of motion was from 0-130 degrees with excellent stability at 0° 30° 90°  The patella tracked midline and flat  After closure of the arthrotomy range of motion, patellar tracking, and stability were unchanged  Patient tolerated the procedure well  There were no complications  Complications:   None    Procedure and Technique:  The patient was identified and marked in the preoperative holding area  Final questions were addressed  Consents were visualized for correct laterality and the intended procedure  Patient was taken back to the operating room where they were transferred to the operating room table and administered spinal  anesthesia by the anesthesia staff  Tourniquet was then applied to the left thigh  The right lower extremity was draped over the foot break in the bed after the split leg attachment was removed, and the popliteal fossa was well padded and the leg was secured in the flexed position off of the operating table  The left lower extremity was prepped and draped in the standard sterile fashion with the addition of the Dong knee positioner  The patient was administered 1 5 g of IV vancomycin   Tranexamic acid  was administered by the anesthesia staff  A final timeout was performed and all members of the operating room staff were in agreement of the intended procedure and the correct laterality was confirmed  An anterior knee incision was marked over the anterior left knee and carried over the medial portion of the patella down medial to the tibial tubercle  The leg was exsanguinated and the tourniquet was inflated to 250 mmHg  An incision was made over the anterior knee using a scalpel, and sharp dissection was carried deep through Mary Ann's fascia creating full thickness flaps  The extensor mechanism was identified  A standard medial parapatellar arthrotomy was performed using a scalpel, and upon doing so benign-appearing yellow intra-articular fluid was expressed  The anterior horn of the medial and lateral meniscus was removed  50% of the patellar fat pad was removed for proper exposure  The distal arthrotomy was used to initiate a medial release around the proximal tibia at the joint line to the mid coronal plane  On inspection there was diffuse grade 4 degenerative change in the medial compartment, lateral compartment, and patellofemoral compartment  the most significantly affected compartment was the medial compartment  The intramedullary drill for the femur was introduced into the medullary canal anterior to the PCL  The distal femoral cutting jig was inserted after being set to 5° of valgus and a 9 mm resection  The distal femoral cutting jig was pinned in place at the 0 position  The distal femur was cut using an oscillating saw  The distal femoral sizing rotation guide was applied to the distal femur and femoral rotation was set to match [place]'s line and confirmed with the epicondylar axis  This was found to be 3° of external rotation and its position was pinned  The femur was sized to be a size 5  The sizing rotation guide was removed and a size 5 four-in-one cutting block was applied to the distal femur    Checking the anterior resection this appeared to be the appropriate size both in the AP and ML planes, and would not cause anterior femoral notching  The block was pinned in place and the anterior, posterior, posterior chamfer, and anterior chamfer cuts were made in succession more protecting all periarticular soft tissues and ligaments  The cutting block was removed and the perimeter of the femur was cleaned from remaining osteophytes using a rongeur  The ACL and PCL were removed using electrocautery  Distal femur finishing guide was applied and the notch was completed  The tibia was subluxed anteriorly in preparation for the tibial resection  The remaining portions of the medial and lateral meniscus were removed using a 15 blade being sure not to violate the MCL or LCL  The area of the lateral geniculate artery was then cauterized using electrocautery  On inspection of the tibia there minimal erosion of the medial tibial plateau and no erosion of the lateral tibial plateau  The tibial cutting jig and extra-medullary alignment guide was applied to the leg  The level of resection was set to just below the osteochondral junction of the proximal medial tibial plateau  The patient's native slope was evaluated and it was found that 5° would be appropriate for the patient  The jig was pinned in the medial lateral proximal tibia  Varus valgus alignment was confirmed and was appropriate at neutral to slight varus to the mechanical axis  With the cutting jig pinned into place and proximal tibia was resected while protecting soft tissues  The proximal tibial osteophytes were removed  A trial tibial baseplate was placed upon the proximal tibia to check alignment and the alignment was unchanged and remained in neutral  The proximal tibia was sized and a size 4 baseplate was found to be appropriate and provided good cortical fit    The baseplate was pinned into place in appropriate rotation aligning with the medial third of the tibial tubercle  The tibia was then pinned, reamed, broached, and finished in sequence in preparation for trialing  Trialing was initiated with a size 7 polyethylene insert placed upon the size 4 tibial baseplate  The size 5 standard femur was then placed upon the femur and trialing began  The 10 mm polyethylene trial demonstrated balanced medial and lateral gaps in flexion and extension while maintaining full extension and reaching 130° of flexion  Very minimal pie crusting at to be performed of the deep MCL to balance the knee in flexion  This was done with an 18 gauge needle  Coronal stability testing revealed that medial lateral gaps were symmetric and extension, mid flexion, and full flexion  The knee demonstrated excellent stability with a range of motion from 0-130° of flexion  The femoral lug drills were drilled, and the trials were removed  Attention was then turned to the patella  The osteo synovial reflection was revealed using a Bovie  The patella height measured 22 millimeters prior to resection  The patella was sized and found to be a 32 mm patellar button  The cutting guide was set for the appropriate depth and the patella was evenly resected using oscillating saw  The 32 mm patellar button was then medialized over the patella and the lug holes were drilled  A trial patella button was placed upon the patella and the pre osteotomy patellar height was restored  A lateral facetectomy of the patella was performed  The tibial trial was removed and the knee was irrigated and debris was removed  The soft tissues of the posterior capsule were cauterized using Bovie to ensure hemostasis  Remaining fragments of the posterior medial and lateral meniscus were removed  The posterior capsule and medial and lateral periarticular soft tissues were injected with a periarticular analgesic cocktail  The knee was again irrigated in preparation for cementation    The tibia was subluxed and all retractors were in place for cementation when final implants were confirmed and placed upon the back table  2 bags of [place] cement without gentamicin were mixed  And the final size 4 S+ tibial implant, 10 mm AOX CR conforming polyethylene insert, size 5 narrow left CR femur, and 32 mm patella button were cemented in sequence  Excess cement was removed after each implant was in place  As the cement cured the remainder of the periarticular pain cocktail was injected into the soft tissues around the knee  Irrigation then followed  After the cement had cured the joint was inspected for any residual loose bodies of cement and these were removed  The tourniquet was released after 71 minutes at 250 mmHg  The tracking and stability of the final components were assessed  The patella tracked midline without tilt, and the knee was stable in both flexion and extension, coronal stability was unchanged, and the knee demonstrated range of motion from 0-130 °  The knee was again irrigated and a very conservative partial synovectomy was performed  Hemostasis was achieved using electrocautery  Closure began using a #2 barbed bidirectional suture for the arthrotomy  After closure of the arthrotomy range of motion to gravity was tested and was found to be 0-130 ° of flexion  Quarter percent Marcaine plain was injected in the subcutaneous soft tissues  The deep subcutaneous tissues were reapproximated with undyed 0 Vicryl, the superficial subcutaneous tissues were reapproximated with undyed 2-0 Vicryl, the skin edges reapproximated with a running 3-0 bidirectional barbed Monocryl suture, and the skin edges were sealed with Dermabond  After the Dermabond had dried a silver impregnated Mepilex dressing was placed over the incision  The drapes were removed and DARRYL stockings were placed on the bilateral lower extremities   Patient was then awakened from anesthesia without difficulty, and transferred to PACU in stable condition        I was present for all critical portions of the procedure    Patient Disposition:  PACU     SIGNATURE: Inell Raw, DO  DATE: August 17, 2020  TIME: 12:55 PM

## 2020-08-17 NOTE — CONSULTS
Inpatient Medical Consultation - [facility] Internal Medicine    Patient Information: Susan Edwards 70 y o  female MRN: [id number]  Unit/Bed#: [address] [place] [address] Encounter: [id number]    PCP: Milton Knutson MD  Date of Admission:  8/17/2020  Date of Consultation: 08/17/20  Requesting Physician: Bryce Miranda DO    Reason For Consultation:     Medical management    History of Present Illness:    Susan Edwards is a 70 y o  female chronic atrial fibrillation status post pacemaker placement, hypertension, Carrero's esophagus, history of breast cancer left breast lumpectomy status post radiation and Arimidex, obesity who is originally admitted to the orthopedic service on 8/17/2020 postoperatively after elective left total knee arthroplasty  Surgery was done under spinal anesthesia, estimated blood loss was 20 cc  We are consulted for medical management  Patient was seen and examined  Comfortably lying in bed  Reported that she had mild nausea after procedure but now she is feeling well without any chest pain, shortness of breath, dizziness or palpitation  She denies any abdominal pain  She reports that she had recent EGD and colonoscopy had mild bleeding after colonoscopy which resolved by its on  Patient denies any prior postoperative complication, bleeding or thromboembolism  Review of Systems:    Review of Systems   Constitutional: Negative for appetite change, chills and fever  HENT: Negative for congestion and sore throat  Eyes: Negative for visual disturbance  Respiratory: Negative for cough and shortness of breath  Cardiovascular: Negative for chest pain  Gastrointestinal: Negative for abdominal pain, blood in stool, constipation, diarrhea, nausea and vomiting  Genitourinary: Negative for difficulty urinating  Musculoskeletal: Positive for arthralgias  Neurological: Negative for dizziness, speech difficulty, light-headedness, numbness and headaches     Psychiatric/Behavioral: Negative for agitation and confusion  Past Medical and Surgical History:     Past Medical History:   Diagnosis Date    Atrial fibrillation [facility] )     Carrero's esophagus     last assessed: 1/23/2018    BRCA1 negative     BRCA2 negative     Breast cancer ([facility] ) 2006    stage 1 (left), given adjuvant radiation with Arimidex x 5 years    Cancer [facility]) 2006    Left Breast, Lumpectomy    Cataract     last assessed: 3/11/2014    Dysplasia of toenail     last assessed: 8/29/2017    Esophageal reflux     GERD (gastroesophageal reflux disease)     Gross hematuria     last assessed: 2/19/2015    Hematuria     Hiatal hernia     History of radiation therapy     Hypertension     Irregular heart beat     AFIB    Mixed sensory-motor polyneuropathy     Neuropathy     Obesity     Pacemaker     Paroxysmal atrial fibrillation (HCC)     Peripheral neuropathy     Rectal bleeding     Restless leg syndrome     Shortness of breath     last assessed: 1/11/2016       Past Surgical History:   Procedure Laterality Date    BREAST BIOPSY Left 2006    BREAST LUMPECTOMY Left 2006    onset: 2006    BREAST SURGERY      CARDIAC PACEMAKER PLACEMENT      x 3 2006    CATARACT EXTRACTION      COLONOSCOPY      CYSTOSCOPY  04/04/2014    diagnostic    HYSTERECTOMY      ALISON BSO; due to fibroid uterus; age 38   [facility] KNEE CARTILAGE SURGERY      excision lesion of meniscus or capsule knee    KNEE SURGERY      OOPHORECTOMY Bilateral     OTHER SURGICAL HISTORY      radiation therapy    CA COLONOSCOPY FLX DX W/COLLJ SPEC WHEN PFRMD N/A 2/8/2017    Procedure: COLONOSCOPY;  Surgeon: Omi Carbajal MD;  Location: [facility]; Service: Gastroenterology    CA ESOPHAGOGASTRODUODENOSCOPY TRANSORAL DIAGNOSTIC N/A 9/20/2017    Procedure: ESOPHAGOGASTRODUODENOSCOPY (EGD); Surgeon: Lowell Collazo MD;  Location: [facility];   Service: Gastroenterology    UPPER GASTROINTESTINAL ENDOSCOPY         Meds/Allergies:    PTA meds:   Prior to Admission Medications   Prescriptions Last Dose Informant Patient Reported? Taking? Acetaminophen (TYLENOL ARTHRITIS PAIN PO) 8/15/2020 Self Yes Yes   Sig: Take 650 mg by mouth daily    Ascorbic Acid (Vitamin C ER) 500 MG CPCR 8/16/2020 at Unknown time Self No Yes   Sig: TAKE 1 CAPSULE BY MOUTH TWICE A DAY   Calcium Acetate, Phos Binder, (CALCIUM ACETATE PO) Past Month at Unknown time Self Yes Yes   Sig: Take by mouth daily     Cholecalciferol (VITAMIN D3) 5000 units CAPS 8/16/2020 at Unknown time Self Yes Yes   Sig: Take by mouth daily    Probiotic Product (PROBIOTIC PO) Past Month at Unknown time Self Yes Yes   Sig: Take by mouth daily    budesonide (PULMICORT) 90 MCG/ACT inhaler More than a month at Unknown time Self No No   Sig: Inhale 1-2 puffs 2 (two) times a day   clobetasol (TEMOVATE) 0 05 % ointment 8/11/2020 Self No Yes   Sig: Apply once weekly to the affected area   famotidine (PEPCID) 40 MG tablet   No No   Sig: Take 1 tablet (40 mg total) by mouth daily   ferrous sulfate 324 (65 Fe) mg 8/16/2020 at Unknown time Self No Yes   Sig: Take 1 tablet (324 mg total) by mouth daily before breakfast   folic acid (FOLVITE) 1 mg tablet 8/11/2020 Self No Yes   Sig: Take 1 tablet (1 mg total) by mouth daily   furosemide (LASIX) 40 mg tablet 8/16/2020 at Unknown time Self No Yes   Sig: Take 1 tablet (40 mg total) by mouth daily   gabapentin (NEURONTIN) 800 mg tablet 8/17/2020 at 0515 Self No Yes   Sig: Take 1 tablet (800 mg total) by mouth 4 (four) times a day   loratadine (CLARITIN) 10 mg tablet 8/16/2020 at Unknown time Self Yes Yes   Sig: Take 10 mg by mouth daily   metoprolol tartrate (LOPRESSOR) 50 mg tablet 8/17/2020 at 0515 Self No Yes   Sig: Take 1 tablet (50 mg total) by mouth 2 (two) times a day   multivitamin (THERAGRAN) TABS Past Month at Unknown time Self Yes Yes   Sig: Take 1 tablet by mouth daily   pramipexole (MIRAPEX) 0 5 mg tablet 8/16/2020 at Unknown time Self No Yes   Sig: One and half tablet at 5:00 p m  and 10:00 p m    warfarin (COUMADIN) 7 5 mg tablet 2020 Self No Yes   Sig: Take 1 tablet (7 5 mg total) by mouth daily   Patient not taking: Reported on 8/10/2020      Facility-Administered Medications: None       Allergies:    Allergies   Allergen Reactions    Cephalexin Rash    Duloxetine Hcl Other (See Comments)     Facial pins and needles sensation    Erythromycin Rash    Levofloxacin Other (See Comments)     Muscular aches    Penicillins Rash    Savella [Milnacipran] Rash    Sulfa Antibiotics Rash     History:     Marital Status: /Civil Union    Substance Use History:   Social History     Substance and Sexual Activity   Alcohol Use Not Currently     Social History     Tobacco Use   Smoking Status Former Smoker    Packs/day:  00    Years: 25 00    Pack years: 25 00    Types: Cigarettes    Last attempt to quit: 1980    Years since quittin 9   Smokeless Tobacco Never Used   Tobacco Comment    Quit over 30 years ago; quit age 41     Social History     Substance and Sexual Activity   Drug Use No       Family History:    Family History   Problem Relation Age of Onset    Hypertension Mother     Heart disease Father     Aneurysm Father     Coronary artery disease Father         in his 76s with aneurysm    Aortic aneurysm Father         abdominal    Scleroderma Sister     Breast cancer Sister 76    Hypertension Sister     Cancer Sister     No Known Problems Son     No Known Problems Son     Testicular cancer Son 39    Thyroid cancer Son 45    Colon cancer Maternal Aunt     Colon cancer Maternal Aunt     Breast cancer Other 48        kaylee's daughter    Alcohol abuse Neg Hx     Substance Abuse Neg Hx     Mental illness Neg Hx     Depression Neg Hx        Physical Exam:     Vitals:   Blood Pressure: 133/79 (20 1438)  Pulse: 77 (20 1438)  Temperature: 97 5 °F (36 4 °C) (20 1438)  Temp Source: Oral (20 1438)  Respirations: 17 (20 1438)  Height: 5' 4 5" (163 8 cm) (08/17/20 0612)  Weight - Scale: 99 8 kg (220 lb) (08/17/20 0612)  SpO2: 97 % (08/17/20 1438)    Physical Exam  Constitutional:       General: She is not in acute distress  Appearance: She is obese  HENT:      Head: Atraumatic  Eyes:      Pupils: Pupils are equal, round, and reactive to light  Neck:      Musculoskeletal: Neck supple  Cardiovascular:      Rate and Rhythm: Normal rate  Heart sounds: Murmur present  Comments: Pacemaker in place  Pulmonary:      Effort: Pulmonary effort is normal  No respiratory distress  Breath sounds: Normal breath sounds  No wheezing or rales  Abdominal:      General: Bowel sounds are normal  There is no distension  Palpations: Abdomen is soft  Tenderness: There is no abdominal tenderness  There is no guarding or rebound  Musculoskeletal:      Comments: Left knee dressing C/D/I   Skin:     General: Skin is warm and dry  Findings: No rash  Neurological:      Mental Status: She is alert  Cranial Nerves: No cranial nerve deficit  Lab Results: I Have Reviewed All Lab Data Below: Invalid input(s): LABALBU  Results from last 7 days   Lab Units 08/17/20  0628   INR  1 32*       * Additional Pertinent Lab Tests Reviewed: Labs noted from 07/10  Imaging: I have personally reviewed pertinent reports  Chest x-ray-mild cardiomegaly left subclavian pacemaker  Echo-2018-EF 60% mild aortic stenosis  Xr Knee Left 1 Or 2 Views    Result Date: 8/17/2020  Narrative: LEFT KNEE INDICATION:   post op xray  COMPARISON:  Left knee plain films of 6/25/2020  VIEWS:  XR KNEE 1 OR 2 VW LEFT FINDINGS: There is no acute fracture or dislocation  There is no joint effusion  Unremarkable appearance of total knee arthroplasty  No evidence of hardware complication  No lytic or blastic osseous lesion  There is soft tissue gas, an expected immediate postoperative finding       Impression: Unremarkable appearance of total knee arthroplasty  Workstation performed: [id number]           [facility] Problem List:     Principal Problem:    Status post total knee replacement using cement, left  Active Problems:    Atrial fibrillation (HCC) [I48 91]    Essential hypertension    Peripheral neuropathy    GERD (gastroesophageal reflux disease)    RLS (restless legs syndrome)    Severe obesity (BMI 35 0-39  9) [facility]      Assessment/Plan    * Status post total knee replacement using cement, left  Assessment & Plan  Status post elective left knee arthroplasty, 8/17  · Incentive spirometry  · PT/OT  · DVT prophylaxis and pain control as per primary team  · Follow-up labs, electrolytes and CBC      Essential hypertension  Assessment & Plan  Continue metoprolol  Patient also takes Lasix at home which she already has received today  Follow-up BMP in a m  Atrial fibrillation ([facility] ) [I48 91]  Assessment & Plan  Chronic status post pacemaker placement, on anticoagulation with Coumadin  Currently rate control  Anticoagulation on hold  · Continue metoprolol  · Continue Lovenox for DVT prophylaxis as per orthopedic  · Resume back to anticoagulation when cleared by Orthopedic team      GERD (gastroesophageal reflux disease)  Assessment & Plan  Status post recent EGD  Continue PPI for now, changed to Pepcid on discharge as advised by GI    Peripheral neuropathy  Assessment & Plan  On gabapentin for long time  Continue at home dose  Monitor renal function    RLS (restless legs syndrome)  Assessment & Plan  On pramipexole        VTE Prophylaxis: Enoxaparin (Lovenox)  / sequential compression device       Counseling / Coordination of Care Time: 45 minutes  Greater than 50% of total time spent on patient counseling and coordination of care  Collaboration of Care:  Were Recommendations Directly Discussed with Primary Treatment Team? - No     ** Please Note: "This note has been constructed using a voice recognition system  Therefore there may be syntax, spelling, and/or grammatical errors   Please call if you have any questions  "**

## 2020-08-17 NOTE — PROGRESS NOTES
Progress Note - Orthopedics   Nereida Bright 70 y o  female MRN: [id number]  Unit/Bed#: 2 [place] [address] Encounter: [id number]    Assessment:  1) POD#0 s/p L TKA    Plan:  Vanco 1 5g IV x 1 Q12 for 24 hours postop  DVT prophylaxis:  Lovenox 40 mg subcu q day/SCD's/Ambulation  WBAT LLE  PT/OT- WBAT  Analgesia PRN  Follow up AM labs  D/c noble in AM POD #1 after OOB with PT  Consult to SLIM - post op medical management  Dressing- monitor for drainage  Discharge planning - disposition pending progress with PT  Plan for discharge to home tomorrow to start outpatient physical therapy later this week    Weight bearing: WBAT LLE    VTE Pharmacologic Prophylaxis: Enoxaparin (Lovenox)  VTE Mechanical Prophylaxis: sequential compression device    Subjective:  Patient seen examined at bedside  Pain control  Patient about the work with physical therapy  Events of surgery discussed with the patient the patient's  via telephone  Vitals: Blood pressure 131/74, pulse 59, temperature 97 5 °F (36 4 °C), temperature source Oral, resp  rate 15, height 5' 4 5" (1 638 m), weight 99 8 kg (220 lb), SpO2 97 %  ,Body mass index is 37 18 kg/m²  Intake/Output Summary (Last 24 hours) at 8/17/2020 1354  Last data filed at 8/17/2020 1215  Gross per 24 hour   Intake 1950 ml   Output 670 ml   Net 1280 ml       Invasive Devices     Peripheral Intravenous Line            Peripheral IV 08/17/20 Right Wrist less than 1 day          Drain            Urethral Catheter Latex 16 Fr  less than 1 day                Physical Exam: NAD  Ortho Exam: LLE: Dsg c/d/i, compartments soft, calf non-tender, +PF/DF/EHL, +DP/SP/Saph/Sural SILT, DP 2+, foot warm    Lab, Imaging and other studies: PO XR L knee:  Reviewed and acceptable    Darryle Schlatter D O    Division of Adult Reconstruction  Department of Orthopaedic Surgery  [facility]

## 2020-08-17 NOTE — ANESTHESIA PROCEDURE NOTES
Peripheral Block    Patient location during procedure: post-op  Start time: 8/17/2020 11:30 AM  Reason for block: at surgeon's request and post-op pain management  Staffing  Performed: anesthesiologist   Preanesthetic Checklist  Completed: patient identified, site marked, surgical consent, pre-op evaluation, timeout performed, IV checked, risks and benefits discussed and monitors and equipment checked  Peripheral Block  Patient position: supine  Prep: ChloraPrep  Patient monitoring: continuous pulse ox and frequent blood pressure checks  Block type: adductor canal block  Laterality: left  Injection technique: single-shot  Procedures: ultrasound guided, Ultrasound guidance required for the procedure to increase accuracy and safety of medication placement and decrease risk of complications  Ultrasound permanent image savedbupivacaine (MARCAINE) 0 5 % perineural infiltration, 20 mL  Needle  Needle type: Stimuplex   Needle gauge: 22 G  Needle length: 10 cm  Needle localization: anatomical landmarks and ultrasound guidance  Needle insertion depth: 6 cm  Test dose: negative  Assessment  Injection assessment: incremental injection, local visualized surrounding nerve on ultrasound, negative aspiration for CSF, negative aspiration for heme and no paresthesia on injection  Paresthesia pain: prolonged  Heart rate change: no  Slow fractionated injection: yes  Post-procedure:  sterile dressing applied and site cleaned  patient tolerated the procedure well with no immediate complications  Additional Notes  0 5% Bupivacaine 20 ml was mixed with Exparel  7 ml for the block

## 2020-08-17 NOTE — PLAN OF CARE
Problem: Potential for Falls  Goal: Patient will remain free of falls  Description: INTERVENTIONS:  - Assess patient frequently for physical needs  -  Identify cognitive and physical deficits and behaviors that affect risk of falls    -  [place] fall precautions as indicated by assessment   - Educate patient/family on patient safety including physical limitations  - Instruct patient to call for assistance with activity based on assessment  - Modify environment to reduce risk of injury  - Consider OT/PT consult to assist with strengthening/mobility  Outcome: Progressing     Problem: MUSCULOSKELETAL - ADULT  Goal: Maintain or return mobility to safest level of function  Description: INTERVENTIONS:  - Assess patient's ability to carry out ADLs; assess patient's baseline for ADL function and identify physical deficits which impact ability to perform ADLs (bathing, care of mouth/teeth, toileting, grooming, dressing, etc )  - Assess/evaluate cause of self-care deficits   - Assess range of motion  - Assess patient's mobility  - Assess patient's need for assistive devices and provide as appropriate  - Encourage maximum independence but intervene and supervise when necessary  - Involve family in performance of ADLs  - Assess for home care needs following discharge   - Consider OT consult to assist with ADL evaluation and planning for discharge  - Provide patient education as appropriate  Outcome: Progressing  Goal: Maintain proper alignment of affected body part  Description: INTERVENTIONS:  - Support, maintain and protect limb and body alignment  - Provide patient/ family with appropriate education  Outcome: Progressing

## 2020-08-17 NOTE — ANESTHESIA PREPROCEDURE EVALUATION
Procedure:  ARTHROPLASTY KNEE TOTAL (Left Knee)    Relevant Problems   CARDIO   (+) Atrial fibrillation (HCC) [I48 91]   (+) Essential hypertension      GI/HEPATIC   (+) GERD (gastroesophageal reflux disease)   (+) Hiatal hernia      MUSCULOSKELETAL   (+) Arthritis   (+) Osteoarthritis of left knee      NEURO/PSYCH   (+) Diabetic polyneuropathy associated with type 2 diabetes mellitus (HCC)        Physical Exam    Airway    Mallampati score: II  TM Distance: >3 FB  Neck ROM: full     Dental       Cardiovascular  Rhythm: irregular, Rate: normal,     Pulmonary  Breath sounds clear to auscultation,     Other Findings        Anesthesia Plan  ASA Score- 3     Anesthesia Type- regional, IV sedation with anesthesia and spinal with ASA Monitors  Additional Monitors:   Airway Plan:           Plan Factors-Exercise tolerance (METS): >4 METS  Patient is not a current smoker  Patient did not smoke on day of surgery  Induction-     Postoperative Plan-     Informed Consent- Anesthetic plan and risks discussed with patient  I personally reviewed this patient with the CRNA  Discussed and agreed on the Anesthesia Plan with the CRNA  Amarilis Moss

## 2020-08-17 NOTE — ANESTHESIA POSTPROCEDURE EVALUATION
Post-Op Assessment Note    CV Status:  Stable  Pain Score: 0    Pain management: adequate     Mental Status:  Alert and awake   Hydration Status:  Euvolemic   PONV Controlled:  Controlled   Airway Patency:  Patent      Post Op Vitals Reviewed: Yes      Staff: CRNA         No complications documented      BP   98/56   Temp  97 5   Pulse  59   Resp   16   SpO2   95% on RA

## 2020-08-17 NOTE — H&P
Assessment/Plan:  1  Primary osteoarthritis of both knees  Case request operating room: ARTHROPLASTY KNEE TOTAL     Comprehensive metabolic panel     Hemoglobin A1C W/EAG Estimation     CBC and differential     If Symptomatic, order: UA w Reflex to Microscopic w Reflex to Culture     Iron Panel (Includes Iron Saturation, Iron, and TIBC)     Protime-INR     APTT     Type and screen     MRSA culture     Ambulatory referral to Cardiology     Ambulatory referral to Family Practice     Ambulatory referral to Physical Therapy     EKG 12 lead     XR chest pa & lateral     PAT Covid Screening     Case request operating room: ARTHROPLASTY KNEE TOTAL     ascorbic Acid (VITAMIN C) 243 MG CPCR     folic acid (FOLVITE) 1 mg tablet     ferrous sulfate 324 (65 Fe) mg   2  Chronic pain of left knee  Case request operating room: ARTHROPLASTY KNEE TOTAL     Comprehensive metabolic panel     Hemoglobin A1C W/EAG Estimation     CBC and differential     If Symptomatic, order: UA w Reflex to Microscopic w Reflex to Culture     Iron Panel (Includes Iron Saturation, Iron, and TIBC)     Protime-INR     APTT     Type and screen     MRSA culture     Ambulatory referral to Cardiology     Ambulatory referral to Family Practice     Ambulatory referral to Physical Therapy     EKG 12 lead     XR chest pa & lateral     PAT Covid Screening     Case request operating room: ARTHROPLASTY KNEE TOTAL     ascorbic Acid (VITAMIN C) 945 MG CPCR     folic acid (FOLVITE) 1 mg tablet     ferrous sulfate 324 (65 Fe) mg   3  Carrero's esophagus with dysplasia  Ambulatory referral to Cardiology     Ambulatory referral to [facility]   4  Type 2 diabetes mellitus with diabetic peripheral angiopathy without gangrene, without long-term current use of insulin [facility])  Ambulatory referral to Cardiology     Ambulatory referral to [facility]   5  Chronic atrial fibrillation [facility])  Ambulatory referral to Cardiology     Ambulatory referral to [facility]   6  Essential hypertension  Ambulatory referral to Cardiology     Ambulatory referral to [facility]   7  Peripheral polyneuropathy  Ambulatory referral to Cardiology     Ambulatory referral to [facility]   8  Severe obesity (BMI 35 0-39  9) with comorbidity [facility])  Ambulatory referral to Cardiology     Ambulatory referral to [facility]   9  Pacemaker  Ambulatory referral to Cardiology     Ambulatory referral to [facility]   10  Pre-operative laboratory examination  Comprehensive metabolic panel     Hemoglobin A1C W/EAG Estimation     CBC and differential     If Symptomatic, order: UA w Reflex to Microscopic w Reflex to Culture     Iron Panel (Includes Iron Saturation, Iron, and TIBC)     Protime-INR     APTT     Type and screen     MRSA culture     Ambulatory referral to Cardiology     Ambulatory referral to Family Practice     Ambulatory referral to Physical Therapy     EKG 12 lead     XR chest pa & lateral     PAT Covid Screening   11  Screening for viral disease  Ambulatory referral to Cardiology     Ambulatory referral to [facility]     PAT Covid Screening     Please see below the last office encounter to serve as today's H&P  Patient was seen and evaluated this morning in the preoperative holding area and there are no changes to her orthopedic exam   She denies any recent illness or changes in overall medical health  She denies any fever, chills, nausea, vomiting, headache, trouble breathing, chest pain  She has been seen and cleared by her PCP and cardiologist prior to the procedure today  She will be a candidate for Lovenox postoperatively for DVT prophylaxis  She will be a good candidate for outpatient physical therapy following her discharge from the hospital   Proceed to OR for left total knee arthroplasty      Vitals:    08/17/20 0612   BP: 130/68   Pulse: 75   Resp: 17   Temp: (!) 97 3 °F (36 3 °C)   SpO2: 95%           Ozzie Paulino is a pleasant 61-year-old female presenting today for follow-up of her activity related left knee pain due to her severe end-stage underlying osteoarthritis and mild varus deformity  She has attempted and failed conservative treatment with Tylenol, physical therapy, use of ambulatory assistive device, home exercise program, weight loss, cortisone injections, and most recently viscosupplementation  Risks and benefits of pursuing a left total knee arthroplasty including but not limited to bleeding, infection, stiffness, need for further surgery, failure of the prosthesis, damage to nerves or vessels, blood clots, heart attack, stroke, and death were discussed  Consents were signed and placed into the chart for a left total knee arthroplasty  She does understand that she is at elevated risk due to her medical comorbidities including long-term anticoagulation with Coumadin due to AFib and diabetes with neuropathy  She is a former smoker, has well-controlled diabetes, and has a history of GI bleed  She denies any history of DVT or PE, malignancy, or MRSA infection  She will need clearance from her primary care physician and a cardiologist prior to the intended procedure  We will need recommendations on Coumadin management  We would like to bridge with Lovenox postoperatively for 5 days to decrease the risk of postoperative hematoma  She was introduced to surgical scheduler for further planning  We look for to taking care of her in the near future     Subjective: Left knee follow up     Patient ID: Chris Christina is a 70 y o  female   Stocktonivory Becerra is a pleasant 82-year-old female presenting today for follow-up of her activity related left knee pain due to her severe end-stage underlying osteoarthritis  She is presenting with her , Cheri Ricks, with the intent to book a left total knee arthroplasty  She has attempted and failed all forms of conservative treatment    She has lost more weight since her previous appointment and intends to lose more for any proposed surgery  Her right knee is still doing okay      Review of Systems   Constitutional: Negative  HENT: Negative  Eyes: Negative  Respiratory: Negative  Cardiovascular: Negative  Gastrointestinal: Negative  Endocrine: Negative  Genitourinary: Negative  Musculoskeletal: Positive for arthralgias, joint swelling and myalgias  Skin: Negative  Allergic/Immunologic: Negative  Neurological: Negative  Hematological: Negative      Psychiatric/Behavioral: Negative           Medical History        Past Medical History:   Diagnosis Date    Atrial fibrillation ([facility] )      Carrero's esophagus       last assessed: 1/23/2018    BRCA1 negative      BRCA2 negative      Breast cancer ([facility] ) 2006     stage 1 (left), given adjuvant radiation with Arimidex x 5 years    Cancer [facility])       Left Breast, Lumpectomy    Cataract       last assessed: 3/11/2014    Dysplasia of toenail       last assessed: 8/29/2017    Esophageal reflux      GERD (gastroesophageal reflux disease)      Gross hematuria       last assessed: 2/19/2015    Hematuria      Hiatal hernia      History of radiation therapy      Hypertension      Irregular heart beat       AFIB    Mixed sensory-motor polyneuropathy      Neuropathy      Obesity      Pacemaker      Paroxysmal atrial fibrillation (HCC)      Peripheral neuropathy      Rectal bleeding      Restless leg syndrome      Shortness of breath       last assessed: 1/11/2016           Surgical History         Past Surgical History:   Procedure Laterality Date    BREAST BIOPSY Left 2006    BREAST LUMPECTOMY Left 2006     onset: 2006    BREAST SURGERY        CARDIAC PACEMAKER PLACEMENT         x 3 2006    CATARACT EXTRACTION        COLONOSCOPY        CYSTOSCOPY   04/04/2014     diagnostic    HYSTERECTOMY         ALISON BSO; due to fibroid uterus; age 38   [place] KNEE CARTILAGE SURGERY         excision lesion of meniscus or capsule knee    KNEE SURGERY        OOPHORECTOMY Bilateral      OTHER SURGICAL HISTORY         radiation therapy    ID COLONOSCOPY FLX DX W/COLLJ SPEC WHEN PFRMD N/A 2017     Procedure: COLONOSCOPY;  Surgeon: Beryl Nava MD;  [facility]; Service: Gastroenterology    ID ESOPHAGOGASTRODUODENOSCOPY TRANSORAL DIAGNOSTIC N/A 2017     Procedure: ESOPHAGOGASTRODUODENOSCOPY (EGD); Surgeon: Zachary Chappell MD;  Location: [facility];   Service: Gastroenterology    UPPER GASTROINTESTINAL ENDOSCOPY                     Family History   Problem Relation Age of Onset    Hypertension Mother      Heart disease Father      Aneurysm Father      Coronary artery disease Father           in his 76s with aneurysm    Aortic aneurysm Father           abdominal    Scleroderma Sister      Breast cancer Sister 76    Hypertension Sister      Cancer Sister      No Known Problems Son      No Known Problems Son      Testicular cancer Son 39    Thyroid cancer Son 45    Colon cancer Maternal Aunt      Colon cancer Maternal Aunt      Breast cancer Other 48         kaylee's daughter    Alcohol abuse Neg Hx      Substance Abuse Neg Hx      Mental illness Neg Hx      Depression Neg Hx          Social History            Occupational History    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        Comment: retired   Tobacco Use    Smoking status: Former Smoker       Packs/day: 1 00       Years: 25 00       Pack years: 25 00       Types: Cigarettes       Last attempt to quit: 1980       Years since quittin 8    Smokeless tobacco: Never Used    Tobacco comment: Quit over 30 years ago; quit age 41   Substance and Sexual Activity    Alcohol use: Not Currently    Drug use: No    Sexual activity: Not on file          Current Outpatient Medications:     Acetaminophen (TYLENOL ARTHRITIS PAIN PO), Take 650 mg by mouth daily , Disp: , Rfl:     budesonide (PULMICORT) 90 MCG/ACT inhaler, Inhale 1-2 puffs 2 (two) times a day, Disp: 1 Inhaler, Rfl: 0    Calcium Acetate, Phos Binder, (CALCIUM ACETATE PO), Take by mouth daily  , Disp: , Rfl:     Cholecalciferol (VITAMIN D3) 5000 units CAPS, Take by mouth daily , Disp: , Rfl:     clobetasol (TEMOVATE) 0 05 % ointment, Apply once weekly to the affected area, Disp: 30 g, Rfl: 3    furosemide (LASIX) 40 mg tablet, Take 1 tablet (40 mg total) by mouth daily, Disp: 90 tablet, Rfl: 3    gabapentin (NEURONTIN) 800 mg tablet, Take 1 tablet (800 mg total) by mouth 4 (four) times a day, Disp: 360 tablet, Rfl: 1    lisinopril (ZESTRIL) 40 mg tablet, Take 1 tablet (40 mg total) by mouth daily As directed, Disp: 90 tablet, Rfl: 1    loratadine (CLARITIN) 10 mg tablet, Take 10 mg by mouth daily, Disp: , Rfl:     metoprolol tartrate (LOPRESSOR) 50 mg tablet, Take 1 5 tablets (75 mg total) by mouth 2 (two) times a day, Disp: 270 tablet, Rfl: 2    multivitamin (THERAGRAN) TABS, Take 1 tablet by mouth daily, Disp: , Rfl:     pramipexole (MIRAPEX) 0 5 mg tablet, One and half tablet at 5:00 p m  and 10:00 p m , Disp: 360 tablet, Rfl: 1    Probiotic Product (PROBIOTIC PO), Take by mouth daily , Disp: , Rfl:     warfarin (COUMADIN) 7 5 mg tablet, Take 7 5 mg by mouth daily, Disp: , Rfl:     ascorbic Acid (VITAMIN C) 500 MG CPCR, Take 1 capsule (500 mg total) by mouth 2 (two) times a day, Disp: 60 capsule, Rfl: 0    ferrous sulfate 324 (65 Fe) mg, Take 1 tablet (324 mg total) by mouth daily before breakfast, Disp: 30 tablet, Rfl: 0    folic acid (FOLVITE) 1 mg tablet, Take 1 tablet (1 mg total) by mouth daily, Disp: 30 tablet, Rfl: 0           Allergies   Allergen Reactions    Cephalexin Rash    Duloxetine Hcl Other (See Comments)       Facial pins and needles sensation    Erythromycin Rash    Levofloxacin Other (See Comments)       Muscular aches    Penicillins Rash    Savella [Milnacipran] Rash    Sulfa Antibiotics Rash        Objective:      Vitals:     07/08/20 1129   BP: 100/70   Temp: (!) 96 7 °F (35 9 °C)        Body mass index is 38 23 kg/m²      Left Knee Exam      Muscle Strength   The patient has normal left knee strength      Tenderness   The patient is experiencing tenderness in the lateral joint line, patella and medial joint line      Range of Motion   Extension:  0 normal   Flexion: normal Left knee flexion: 115°      Tests   Ned:  Medial - negative Lateral - negative  Varus: negative Valgus: negative  Drawer:  Anterior - negative     Posterior - negative  Patellar apprehension: negative     Other   Erythema: absent  Scars: absent  Sensation: normal  Pulse: present  Swelling: none  Effusion: no effusion present     Comments:  Crepitance on AROM and PROM-parapatellar  + PFG  Grossly distally NVU with neuropathy of both feet  Ambulates without assistive device but with significantly antalgic gait  5-7 degree varus deformity passively correctable at neutral              Observations   Left Knee   Negative for effusion          Physical Exam   Constitutional: She is oriented to person, place, and time  She appears well-developed and well-nourished  Body mass index is 38 23 kg/m²  HENT:   Head: Normocephalic and atraumatic  Eyes: EOM are normal    Neck: Normal range of motion  Cardiovascular: Intact distal pulses  Pulmonary/Chest: Effort normal    Musculoskeletal:        Left knee: She exhibits no effusion  See ortho exam   Neurological: She is alert and oriented to person, place, and time  Skin: Skin is warm and dry  Psychiatric: She has a normal mood and affect  Her behavior is normal  Judgment and thought content normal    Nursing note and vitals reviewed      I have personally reviewed pertinent films in PACS of the weight-bearing x-rays taken previously of her left knee which demonstrate severe end-stage degenerative changes with varus deformities and bone-on-bone appearance of the medial compartment    She has tricompartmental osteophytosis and narrowing of the patellofemoral compartment  Sclerosis is also seen    There are no lytic or blastic lesions

## 2020-08-17 NOTE — ADDENDUM NOTE
Addendum  created 08/17/20 1143 by Aria Allan MD    Child order released for a procedure order, Clinical Note Signed, Intraprocedure Blocks edited

## 2020-08-17 NOTE — ANESTHESIA PROCEDURE NOTES
Spinal Block    Patient location during procedure: OR  Start time: 8/17/2020 7:43 AM  Reason for block: at surgeon's request and primary anesthetic  Staffing  Anesthesiologist: Almita Mary MD  Performed: anesthesiologist   Preanesthetic Checklist  Completed: patient identified, site marked, surgical consent, pre-op evaluation, timeout performed, IV checked, risks and benefits discussed and monitors and equipment checked  Spinal Block  Patient position: sitting  Prep: ChloraPrep  Patient monitoring: heart rate, cardiac monitor, continuous pulse ox and frequent blood pressure checks  Approach: midline  Location: L3-4  Injection technique: single-shot  Needle  Needle type: Pencan  Needle gauge: 25 G  Needle length: 10 cm  Assessment  Injection Assessment:  negative aspiration for heme, no paresthesia on injection and positive aspiration for clear CSF    Post-procedure:  adhesive bandage applied  Additional Notes  1 6 mL 0 75% Bupivacaine

## 2020-08-18 VITALS
BODY MASS INDEX: 36.65 KG/M2 | HEART RATE: 83 BPM | DIASTOLIC BLOOD PRESSURE: 66 MMHG | SYSTOLIC BLOOD PRESSURE: 115 MMHG | TEMPERATURE: 98.7 F | OXYGEN SATURATION: 89 % | WEIGHT: 220 LBS | HEIGHT: 65 IN | RESPIRATION RATE: 14 BRPM

## 2020-08-18 PROBLEM — M79.89 LEG SWELLING: Status: ACTIVE | Noted: 2020-08-18

## 2020-08-18 LAB
ANION GAP SERPL CALCULATED.3IONS-SCNC: 6 MMOL/L (ref 4–13)
BASOPHILS # BLD AUTO: 0.02 THOUSANDS/ΜL (ref 0–0.1)
BASOPHILS NFR BLD AUTO: 0 % (ref 0–1)
BUN SERPL-MCNC: 22 MG/DL (ref 5–25)
CALCIUM SERPL-MCNC: 8.4 MG/DL (ref 8.3–10.1)
CHLORIDE SERPL-SCNC: 100 MMOL/L (ref 100–108)
CO2 SERPL-SCNC: 30 MMOL/L (ref 21–32)
CREAT SERPL-MCNC: 1.12 MG/DL (ref 0.6–1.3)
EOSINOPHIL # BLD AUTO: 0 THOUSAND/ΜL (ref 0–0.61)
EOSINOPHIL NFR BLD AUTO: 0 % (ref 0–6)
ERYTHROCYTE [DISTWIDTH] IN BLOOD BY AUTOMATED COUNT: 14 % (ref 11.6–15.1)
GFR SERPL CREATININE-BSD FRML MDRD: 47 ML/MIN/1.73SQ M
GLUCOSE SERPL-MCNC: 138 MG/DL (ref 65–140)
HCT VFR BLD AUTO: 37.7 % (ref 34.8–46.1)
HGB BLD-MCNC: 11.9 G/DL (ref 11.5–15.4)
IMM GRANULOCYTES # BLD AUTO: 0.06 THOUSAND/UL (ref 0–0.2)
IMM GRANULOCYTES NFR BLD AUTO: 1 % (ref 0–2)
LYMPHOCYTES # BLD AUTO: 0.96 THOUSANDS/ΜL (ref 0.6–4.47)
LYMPHOCYTES NFR BLD AUTO: 9 % (ref 14–44)
MCH RBC QN AUTO: 31.2 PG (ref 26.8–34.3)
MCHC RBC AUTO-ENTMCNC: 31.6 G/DL (ref 31.4–37.4)
MCV RBC AUTO: 99 FL (ref 82–98)
MONOCYTES # BLD AUTO: 0.75 THOUSAND/ΜL (ref 0.17–1.22)
MONOCYTES NFR BLD AUTO: 7 % (ref 4–12)
NEUTROPHILS # BLD AUTO: 8.8 THOUSANDS/ΜL (ref 1.85–7.62)
NEUTS SEG NFR BLD AUTO: 83 % (ref 43–75)
NRBC BLD AUTO-RTO: 0 /100 WBCS
PLATELET # BLD AUTO: 176 THOUSANDS/UL (ref 149–390)
PMV BLD AUTO: 11.7 FL (ref 8.9–12.7)
POTASSIUM SERPL-SCNC: 4.6 MMOL/L (ref 3.5–5.3)
RBC # BLD AUTO: 3.81 MILLION/UL (ref 3.81–5.12)
SODIUM SERPL-SCNC: 136 MMOL/L (ref 136–145)
WBC # BLD AUTO: 10.59 THOUSAND/UL (ref 4.31–10.16)

## 2020-08-18 PROCEDURE — 97535 SELF CARE MNGMENT TRAINING: CPT

## 2020-08-18 PROCEDURE — 80048 BASIC METABOLIC PNL TOTAL CA: CPT | Performed by: PHYSICIAN ASSISTANT

## 2020-08-18 PROCEDURE — 97167 OT EVAL HIGH COMPLEX 60 MIN: CPT

## 2020-08-18 PROCEDURE — 85025 COMPLETE CBC W/AUTO DIFF WBC: CPT | Performed by: PHYSICIAN ASSISTANT

## 2020-08-18 PROCEDURE — 97116 GAIT TRAINING THERAPY: CPT

## 2020-08-18 PROCEDURE — 99226 PR SBSQ OBSERVATION CARE/DAY 35 MINUTES: CPT | Performed by: INTERNAL MEDICINE

## 2020-08-18 PROCEDURE — 97110 THERAPEUTIC EXERCISES: CPT

## 2020-08-18 PROCEDURE — 99024 POSTOP FOLLOW-UP VISIT: CPT | Performed by: ORTHOPAEDIC SURGERY

## 2020-08-18 RX ORDER — OXYCODONE HYDROCHLORIDE 5 MG/1
TABLET ORAL
Qty: 30 TABLET | Refills: 0 | Status: SHIPPED | OUTPATIENT
Start: 2020-08-18 | End: 2020-09-02 | Stop reason: SDUPTHER

## 2020-08-18 RX ORDER — ACETAMINOPHEN 325 MG/1
975 TABLET ORAL EVERY 8 HOURS SCHEDULED
Qty: 30 TABLET | Refills: 0
Start: 2020-08-18 | End: 2021-06-03 | Stop reason: SDUPTHER

## 2020-08-18 RX ORDER — DOCUSATE SODIUM 100 MG/1
100 CAPSULE, LIQUID FILLED ORAL 2 TIMES DAILY
Qty: 60 CAPSULE | Refills: 0 | Status: SHIPPED | OUTPATIENT
Start: 2020-08-18 | End: 2021-06-02 | Stop reason: ALTCHOICE

## 2020-08-18 RX ADMIN — LORATADINE 10 MG: 10 TABLET ORAL at 08:28

## 2020-08-18 RX ADMIN — DEXAMETHASONE SODIUM PHOSPHATE 10 MG: 4 INJECTION, SOLUTION INTRAMUSCULAR; INTRAVENOUS at 09:59

## 2020-08-18 RX ADMIN — FUROSEMIDE 40 MG: 40 TABLET ORAL at 08:28

## 2020-08-18 RX ADMIN — OXYCODONE HYDROCHLORIDE 5 MG: 5 TABLET ORAL at 05:24

## 2020-08-18 RX ADMIN — GABAPENTIN 800 MG: 400 CAPSULE ORAL at 08:28

## 2020-08-18 RX ADMIN — SODIUM CHLORIDE 75 ML/HR: 0.9 INJECTION, SOLUTION INTRAVENOUS at 04:19

## 2020-08-18 RX ADMIN — OXYCODONE HYDROCHLORIDE 5 MG: 5 TABLET ORAL at 09:59

## 2020-08-18 RX ADMIN — ACETAMINOPHEN 975 MG: 325 TABLET, FILM COATED ORAL at 05:24

## 2020-08-18 RX ADMIN — GABAPENTIN 800 MG: 400 CAPSULE ORAL at 12:37

## 2020-08-18 RX ADMIN — METOPROLOL TARTRATE 50 MG: 50 TABLET, FILM COATED ORAL at 08:28

## 2020-08-18 RX ADMIN — PANTOPRAZOLE SODIUM 40 MG: 40 TABLET, DELAYED RELEASE ORAL at 05:24

## 2020-08-18 RX ADMIN — FOLIC ACID 1 MG: 1 TABLET ORAL at 08:28

## 2020-08-18 RX ADMIN — ENOXAPARIN SODIUM 40 MG: 40 INJECTION SUBCUTANEOUS at 08:28

## 2020-08-18 RX ADMIN — FERROUS SULFATE TAB 325 MG (65 MG ELEMENTAL FE) 325 MG: 325 (65 FE) TAB at 08:28

## 2020-08-18 RX ADMIN — ACETAMINOPHEN 975 MG: 325 TABLET, FILM COATED ORAL at 14:14

## 2020-08-18 RX ADMIN — DOCUSATE SODIUM 100 MG: 100 CAPSULE, LIQUID FILLED ORAL at 08:28

## 2020-08-18 RX ADMIN — HYDROMORPHONE HYDROCHLORIDE 0.5 MG: 1 INJECTION, SOLUTION INTRAMUSCULAR; INTRAVENOUS; SUBCUTANEOUS at 08:28

## 2020-08-18 NOTE — SOCIAL WORK
LOS - 1 day    SW met with pt to assess needs and discuss plans  Discussed goals of making sure pt's needs are met upon discharge and that Freedom of Choice is offered  Pt was admitted after scheduled orthopedic surgery  Pt lives with her   Independent with ADLs and mobility prior to surgery  Pt has cane, walker and commode at home  No history of HHC/STR  No MH or D&A issues  Pt's PCP is Dr Leeanne Love MD   Per pt she has prescription coverage and has no difficulty getting her medication as prescribed  Preferred pharmacy is [facility]  Pt does have POA/advanced directives  No copy on chart, copy requested  Per pt her  is her POA  Discussed discharge plans  Pt's plan is to return home with  and begin outpatient therapy  Pt is considering getting shower bench but is planning to measure tub and purchase one on her own  No other discharge needs expressed or anticipated by pt at this time  Pt's  will be transporting her home

## 2020-08-18 NOTE — PLAN OF CARE
Problem: Potential for Falls  Goal: Patient will remain free of falls  Description: INTERVENTIONS:  - Assess patient frequently for physical needs  -  Identify cognitive and physical deficits and behaviors that affect risk of falls    -  [place] fall precautions as indicated by assessment   - Educate patient/family on patient safety including physical limitations  - Instruct patient to call for assistance with activity based on assessment  - Modify environment to reduce risk of injury  - Consider OT/PT consult to assist with strengthening/mobility  Outcome: Adequate for Discharge     Problem: MUSCULOSKELETAL - ADULT  Goal: Maintain or return mobility to safest level of function  Description: INTERVENTIONS:  - Assess patient's ability to carry out ADLs; assess patient's baseline for ADL function and identify physical deficits which impact ability to perform ADLs (bathing, care of mouth/teeth, toileting, grooming, dressing, etc )  - Assess/evaluate cause of self-care deficits   - Assess range of motion  - Assess patient's mobility  - Assess patient's need for assistive devices and provide as appropriate  - Encourage maximum independence but intervene and supervise when necessary  - Involve family in performance of ADLs  - Assess for home care needs following discharge   - Consider OT consult to assist with ADL evaluation and planning for discharge  - Provide patient education as appropriate  Outcome: Adequate for Discharge  Goal: Maintain proper alignment of affected body part  Description: INTERVENTIONS:  - Support, maintain and protect limb and body alignment  - Provide patient/ family with appropriate education  Outcome: Adequate for Discharge

## 2020-08-18 NOTE — PROGRESS NOTES
Progress Note - Orthopedics   Bolivar Monteiro 70 y o  female MRN: [id number]  Unit/Bed#: 2 [place] [address] Encounter: [id number]    Assessment:  1) POD#1 s/p Left TKA    Plan:  Vanco 1 5g IV x 1 Q12 for 24 hours postop - completed  DVT prophylaxis:  Lovenox 40 mg subcu q day/SCD's/Ambulation  PT/OT- WBAT LLE  Analgesia PRN - rely on oral medications  Follow up AM labs - stable, DC IVF, no evidence of hematoma  Ledesma DC - monitor for proper void  Consult to SLIM - post op medical management  Dressing- monitor for drainage, Mepilex may remain in place 7 days  Discharge planning - disposition pending progress with PT  Plan for discharge to home today or tomorrow pending progress to start outpatient physical therapy later this week    Weight bearing: WBAT LLE    VTE Pharmacologic Prophylaxis: Enoxaparin (Lovenox)  VTE Mechanical Prophylaxis: sequential compression device    Subjective:  Patient seen and examined at bedside  Pain controlled  No overnight events  Able to sit in bed with physical therapy yesterday, but does not recall standing  Denies any chest pain, shortness of breath dizziness, lightheadedness, or paresthesias    Vitals: Blood pressure 106/63, pulse 65, temperature 98 9 °F (37 2 °C), resp  rate 16, height 5' 4 5" (1 638 m), weight 99 8 kg (220 lb), SpO2 (!) 89 %  ,Body mass index is 37 18 kg/m²        Intake/Output Summary (Last 24 hours) at 8/18/2020 0724  Last data filed at 8/18/2020 0701  Gross per 24 hour   Intake 2833 75 ml   Output 3470 ml   Net -636 25 ml       Invasive Devices     Peripheral Intravenous Line            Peripheral IV 08/17/20 Right Wrist 1 day                Physical Exam: NAD, A&Ox3, resting comfortably min  Ortho Exam: LLE: left anterior knee dsg c/d/i, compartments soft, calf non-tender, +PF/DF/EHL, +DP/SP/Saph/Sural SILT, DP 2+, foot warm, TEDs/foot pumps in place    Lab, Imaging and other studies: PO XR L knee:  Reviewed and acceptable alignment of left total knee prosthesis    Lab Results   Component Value Date    WBC 10 59 (H) 08/18/2020    HGB 11 9 08/18/2020    HCT 37 7 08/18/2020    MCV 99 (H) 08/18/2020     08/18/2020     Lab Results   Component Value Date     11/09/2015    SODIUM 136 08/18/2020    K 4 6 08/18/2020     08/18/2020    CO2 30 08/18/2020    ANIONGAP 11 9 11/09/2015    AGAP 6 08/18/2020    BUN 22 08/18/2020    CREATININE 1 12 08/18/2020    GLUC 138 08/18/2020    GLUF 87 07/10/2020    CALCIUM 8 4 08/18/2020    AST 25 07/10/2020    ALT 31 07/10/2020    ALKPHOS 77 07/10/2020    PROT 7 2 11/09/2015    TP 8 0 07/10/2020    BILITOT 0 5 11/09/2015    TBILI 0 90 07/10/2020    EGFR 47 08/18/2020     Vicky Murillo PA-C

## 2020-08-18 NOTE — PHYSICAL THERAPY NOTE
PT TREATMENT     08/18/20 0953   Pain Assessment   Pain Assessment Tool 0-10   Pain Score 4  (L knee area)   Restrictions/Precautions   LLE Weight Bearing Per Order WBAT   Other Precautions Fall Risk;Pain   General   Chart Reviewed Yes   Family/Caregiver Present No   Cognition   Arousal/Participation Cooperative   Subjective   Subjective "I'm ready to go home"   Transfers   Sit to Stand 5  Supervision   Additional items Verbal cues  (cuing for proper hand placement)   Stand to Sit 5  Supervision   Additional items Verbal cues  (cuing for safety with hand placement)   Toilet transfer 5  Supervision   Additional items Commode;Verbal cues  (patient independent with urination hygiene)   Ambulation/Elevation   Gait Assistance 5  Supervision   Additional items Verbal cues   Assistive Device Rolling walker   Distance 15 feet x 2 with change in direction, antalgic type gait patterning with L knee discomfort   Stair Management Assistance 4  Minimal assist   Additional items Assist x 1;Verbal cues; Tactile cues   Stair Management Technique One rail R;With cane; Step to pattern   Number of Stairs 4   Exercises   Quad Sets Sitting;10 reps; Left   Hip Flexion Sitting;10 reps; Left   Knee AROM Long Arc Quad Sitting;10 reps; Left   Ankle Pumps Sitting;20 reps;Bilateral   Marching Standing;10 reps;Bilateral   Balance training  sidestepping and backward walking completed for strengthening, balance and gait training   Assessment   Assessment patient demonstrating improving gait balance and endurance on level and unlevel surfaces and will benefit from continued PT with progression to out patient PT upon discharge from hospital   Plan   Treatment/Interventions ADL retraining;Functional transfer training;LE strengthening/ROM; Elevations; Therapeutic exercise; Endurance training;Patient/family training;Equipment eval/education; Bed mobility;Gait training; Compensatory technique education   PT Frequency Twice a day   Recommendation   PT Discharge Recommendation Other (Comment)  (out patient PT)   Licensure   [place] License Number  Aileen Foreman PT [id number]

## 2020-08-18 NOTE — ASSESSMENT & PLAN NOTE
Status post elective left knee arthroplasty, 8/17  · Incentive spirometry  · PT/OT  · DVT prophylaxis and pain control as per primary team  · Follow-up labs, electrolytes and CBC

## 2020-08-18 NOTE — DISCHARGE SUMMARY
Discharge Summary - Orin Ortiz 70 y o  female MRN: [id number]    Unit/Bed#: 2 [place] [address] Encounter: [id number]    Admission Date:   Admission Orders (From admission, onward)     Ordered        08/17/20 1044  Inpatient Admission  Once                     Admitting Diagnosis: Primary osteoarthritis of both knees [M17 0]  Chronic pain of left knee [M25 562, G89 29]    HPI:  Patient is a pleasant 28-year-old female very well known to our practice for her activity related bilateral knee pain due to her severe end-stage underlying osteoarthritis  She has attempted and failed multiple forms of conservative treatment including analgesia, physical therapy, brace wear, use of ambulatory assistive device, and injections  She continues to be limited in her ability to perform age-appropriate activities of daily living  After discussing risks and benefits, consents were signed and placed into the chart for a left total knee arthroplasty  She received clearance is from her primary care physician and a cardiologist prior to the intended procedure, including perioperative management of her Coumadin for atrial fibrillation  She presented to the hospital on 08/17/2020 for a left total knee arthroplasty with Dr Madyson Guaman  Procedures Performed:  Left total knee arthroplasty using cement performed by Dr Madyson Guaman on 08/17/2020  No intraoperative complications  Please see operative report full details  Summary of Hospital Course:  Patient is a pleasant 28-year-old female that underwent a left total knee arthroplasty using cement on 08/17/2020  She received spinal anesthesia with a subsequent adductor canal block using Exparel in the PACU, which she tolerated well without difficulty or complication  She was transferred to the PACU and subsequently the floor in stable condition  The internal medicine team was consulted for co management of her chronic medical issues, all of which remained stable throughout her stay    She was able to stand with physical therapy on postoperative day 0  She did not have any overnight events  On postoperative day 1, the Ledesma catheter was removed and she was able to void without difficulty or complication for the remainder of her stay  Her vital signs and laboratory values were monitored, and all remained within the normal expected postoperative limits  She was again able to work with physical therapy in the morning in the afternoon of postoperative day 1, and made significant gains in her overall comfort and function  Her pain remained controlled  After discussion with the therapy team, patient, and internal medicine team, it was determined that she was safe to discharge home in the afternoon of postoperative day 1  She will begin outpatient physical therapy later this week  She will be bridged with Lovenox for 5 days before reinitiate in her home Coumadin dose in order to reduce the risk of hematoma or hemarthrosis  Discharge instructions and all of patient's questions were discussed in detail at bedside prior to discharge  She will follow with Dr Angel Lynn in 2 weeks  Significant Findings, Care, Treatment and Services Provided:  Left total knee arthroplasty using cement    Complications:  None    Discharge Diagnosis:  Status post left total knee arthroplasty using cement    Resolved Problems  Date Reviewed: 8/18/2020    None          Condition at Discharge: stable     Discharge instructions/Information to patient and family:   See after visit summary for information provided to patient and family  Provisions for Follow-Up Care:  See after visit summary for information related to follow-up care and any pertinent home health orders  PCP: José Miguel Carpenter MD    Disposition: Home to begin outpatient physical therapy later this week    Planned Readmission: No    Discharge Statement   I spent 30 minutes discharging the patient  This time was spent on the day of discharge   I had direct contact with the patient on the day of discharge  Additional documentation is required if more than 30 minutes were spent on discharge  Discharge Medications:  See after visit summary for reconciled discharge medications provided to patient and family

## 2020-08-18 NOTE — NURSING NOTE
Pt left via wheelchair accompanied by  and Ludivina Boland  IV dc and intact  Discharge instructions reviewed with pt  Prescriptions called to pharmacy  Up to date with immunizations  Non-smoker  No further questions at this time

## 2020-08-18 NOTE — PROGRESS NOTES
Progress Note - Nikunj Sweet 1940, 70 y o  female MRN: [id number]    Unit/Bed#: [address] [place] [address] Encounter: [id number]    Primary Care Provider: Wing Chaya MD   Date and time admitted to hospital: 8/17/2020  5:50 AM        * Atrial fibrillation ([facility] [address] ) [I48 91]  Assessment & Plan  Chronic status post pacemaker placement, on anticoagulation with Coumadin  Currently rate control  Anticoagulation on hold  · Continue metoprolol  · Continue Lovenox for DVT prophylaxis as per orthopedic  · Resume back to anticoagulation when cleared by Orthopedic team      Leg swelling  Assessment & Plan  Patient had history of bilateral leg swelling, most likely secondary to venous congestion and stasis  Possibly poor circulation  Patient had a workup for CHF as per the patient from her primary care physician which was negative  Patient does take Lasix chronically  We will resume Lasix 40 mg daily  Monitor BMP  Creatinine new increased from 0 8 to 1 1  If creatinine rises tomorrow, we will hold Lasix  Status post total knee replacement using cement, left  Assessment & Plan  Status post elective left knee arthroplasty, 8/17  · Incentive spirometry  · PT/OT  · DVT prophylaxis and pain control as per primary team  · Follow-up labs, electrolytes and CBC      RLS (restless legs syndrome)  Assessment & Plan  On pramipexole    Essential hypertension  Assessment & Plan  Continue metoprolol, Lasix  Follow-up BMP in a m  Severe obesity (BMI 35 0-39  9) with comorbidity [facility])  Assessment & Plan  Diet and exercise counseled  GERD (gastroesophageal reflux disease)  Assessment & Plan  Status post recent EGD  Continue PPI for now, changed to Pepcid on discharge as advised by GI    Peripheral neuropathy  Assessment & Plan  On gabapentin for long time  Continue at home dose  Monitor renal function      Subjective:   I have seen and Examined the patient at the bedside  No CP or Sob   No fevers or chills, No nausea or vomiting  Overnight events reviewed with the RN  No Other complains  Patient complains of left knee pain but well control with pain medications  Tolerating PT OT well  Review of System:   Denies any CP or SOB  Denies any Cough or Cold  Denies any Fevers or chills  Denies any focal tingling numbness or weakness in any extremities  Denies any abdominal pain, Nausea or vomiting  Objective:   Temp (24hrs), Av 6 °F (36 4 °C), Min:96 1 °F (35 6 °C), Max:98 9 °F (37 2 °C)    Temp:  [96 1 °F (35 6 °C)-98 9 °F (37 2 °C)] 98 7 °F (37 1 °C)  HR:  [59-83] 83  Resp:  [14-20] 14  BP: ()/(56-79) 115/66  SpO2:  [89 %-97 %] 89 %  Body mass index is 37 18 kg/m²  Input and Output Summary (last 24 hours): Intake/Output Summary (Last 24 hours) at 2020 0929  Last data filed at 2020 0701  Gross per 24 hour   Intake 2433 75 ml   Output 3350 ml   Net -916 25 ml     I/O        0701 -  0700  0701 -  0700  0701 -  0700    P  O   0     I V  (mL/kg)  2631 3 (26 4) 202 5 (2)    Total Intake(mL/kg)  2631 3 (26 4) 202 5 (2)    Urine (mL/kg/hr)  3450 (1 4)     Blood  20     Total Output  3470     Net  -838 8 +202 5                 Physical Exam:   General : Alert, Awake and oriented x 2-3, NAD  Neck : Supple  Eyes:  OSIRIS, EOMI  CVS : S1, S2, RRR  Systolic murmur heard  R/S : Clear to auscultate anteriorly  Abd: Soft, NT, ND  Bs+ve  Extremity: Trace pedal edema noted bilaterally  Patient has Jayden stockings  Range of motion limited on the left knee  Skin: No acute Rash noted  CNS: No acute FND       Additional Data:     Labs & Imaging Data Reviewed :    Results from last 7 days   Lab Units 20  0532   WBC Thousand/uL 10 59*   HEMOGLOBIN g/dL 11 9   HEMATOCRIT % 37 7   PLATELETS Thousands/uL 176   NEUTROS PCT % 83*   LYMPHS PCT % 9*   MONOS PCT % 7   EOS PCT % 0     Results from last 7 days   Lab Units 20  0532   POTASSIUM mmol/L 4 6   CHLORIDE mmol/L 100 CO2 mmol/L 30   BUN mg/dL 22   CREATININE mg/dL 1 12   CALCIUM mg/dL 8 4     Results from last 7 days   Lab Units 08/17/20  0628   INR  1 32*     Lab Results   Component Value Date    HGBA1C 5 4 07/10/2020      XR knee left 1 or 2 views   Final Result by Kyleigh Landrum MD (08/17 1205)      Unremarkable appearance of total knee arthroplasty        Workstation performed: [id number]             Cultures:   Blood Culture: No results found for: BLOODCX  Urine Culture:   Lab Results   Component Value Date    URINECX >100,000 cfu/ml Escherichia coli (A) 03/28/2019    URINECX <10,000 cfu/ml  03/28/2019     Sputum Culture: No components found for: SPUTUMCX  Wound Culture: No results found for: WOUNDCULT    Last 24 Hours Medication List:   Current Facility-Administered Medications   Medication Dose Route Frequency Provider Last Rate    acetaminophen  975 mg Oral Q8H [address] SERAFIN Calabrese-RUTHIE      aluminum-magnesium hydroxide-simethicone  30 mL Oral Q6H PRN Rafael Barbosa PA-C      bisacodyl  10 mg Rectal Daily PRN SERAFIN Calabrese-RUTHIE      bupivacaine liposomal  20 mL Infiltration Once Mamadou MD Danielle      dexamethasone  10 mg Intravenous Once Rafael Barbosa PA-C      docusate sodium  100 mg Oral BID SERAFIN Calabrese-RUTHIE      enoxaparin  40 mg Subcutaneous Daily SERAFIN Clemente-RUTHIE      ferrous sulfate  325 mg Oral Daily With Breakfast SERAFIN Calabrese-RUTHIE      folic acid  1 mg Oral Daily SERAFIN Calabrese-RUTHIE      furosemide  40 mg Oral Daily SERAFIN Calabrese-C      gabapentin  800 mg Oral 4x Daily SERAFIN Calabrese-RUTHIE      HYDROmorphone  0 5 mg Intravenous Q2H PRN SERAFIN Calabrese-RUTHIE      lactated ringers  1,000 mL Intravenous Once PRN Rafael Barbosa PA-C      And    lactated ringers  1,000 mL Intravenous Once PRN Rafael Barbosa PA-C      loratadine  10 mg Oral Daily SERAFIN Calabrese-RUTHIE      metoprolol tartrate  50 mg Oral BID SERAFIN Calabrese-RUTHIE      ondansetron  4 mg Intravenous Q4H PRN Welby Debbi Carole Guo PA-C      oxyCODONE  5 mg Oral Q4H PRN Mirta DottieABRB      pantoprazole  40 mg Oral Early Morning Mirta DottieBARB      pramipexole  0 5 mg Oral HS Phil Cortez MD      senna  2 tablet Oral HS PRN Mirta BARB Sinclair      sodium chloride  1,000 mL Intravenous Once PRN Mirta BARB Sinclair      And    sodium chloride  1,000 mL Intravenous Once PRN Mirta DottieBARB         Patient is at moderate risk for morbidity and mortality due to above mentioned illness and comorbidities

## 2020-08-18 NOTE — DISCHARGE INSTRUCTIONS
Total Knee Replacement     WHAT YOU SHOULD KNOW:   Total knee replacement is surgery to replace a knee joint damaged by wear, injury, or disease  It is also called a total knee arthroplasty  The knee joint is where your femur (thigh bone) and tibia (large lower leg bone or shin bone) meet  A small bone called the patella (kneecap) protects your knee joint  AFTER YOU LEAVE:     Medicines:   · Pain medicine: You may be given a prescription medicine to decrease pain  Do not wait until the pain is severe before you take this medicine  We recommend that you take Tylenol 975mg every 8 hours for baseline pain control  Oxycodone can be used as needed, but no more than 1-2 tablets every 4 to 6 hours  We recommend that you but the pills in half to take a 2 5mg dose  · Stool softeners  make it easier for you to have a bowel movement  You may need this medicine to treat or prevent constipation  You will be given Colace 100mg to take twice a day    · Anticoagulants  are a type of blood thinner medicine that helps prevent clots  Clots can cause strokes, heart attacks, and death  These medicines may cause you to bleed or bruise more easily  You will be given Lovenox 40 mg to take daily for 5 days after surgery  After 5 days, you may resume her home Coumadin dose  You will need to recheck your INR 2-3 days after initiating Coumadin  ¨ Watch for bleeding from your gums or nose  Watch for blood in your urine and bowel movements  Use a soft washcloth and a soft toothbrush  If you shave, use an electric razor  Avoid activities that can cause bruising or bleeding  · Take your medicine as directed  Call your healthcare provider if you think your medicine is not helping or if you have side effects  Tell him if you are allergic to any medicine  Keep a list of the medicines, vitamins, and herbs you take  Include the amounts, and when and why you take them  Bring the list or the pill bottles to follow-up visits  Carry your medicine list with you in case of an emergency  Follow up with your orthopedist as directed: You may need to return to have your wound checked and stitches, staples, or drain removed  Write down your questions so you remember to ask them during your visits  Please make an appointment to see Dr Alin Armijo 2 weeks postoperatively  Physical therapy:  A physical therapist teaches you exercises to help improve movement and strength, and to decrease pain  You will begin outpatient physical therapy later this week as planned  Self-care:   · Wound Care:  Please leave the Mepilex dressing in place for 7 days after surgery  After 7 days, you may remove the dressing and leave the incision open to air  Do not get the dressing or the incision wet until your seen in the office  If the incision is uncomfortable under clothing after the Mepilex is removed, you may cover it with a a dry sterile dressing, but should remain dry at all times  · Use ice:  Ice helps decrease swelling and pain  Ice may also help prevent tissue damage  Use an ice pack or put crushed ice in a plastic bag  Cover it with a towel, and place it on your knee for 15 to 20 minutes every hour as directed  · Wear pressure stockings: These are long, tight stockings that put pressure on your legs to promote blood flow and prevent clots  · Use a knee brace, cane, walker, or crutches as directed: These devices will help decrease your risk of falling  Contact your orthopedist if:  · You have a fever over 101 0°F     · You have trouble moving or bending your joint  · You have increased pain and swelling in your joint, even after you take pain medicine  · You have back pain or lower leg pain when you bend your foot upwards  · You have questions or concerns about your condition or care  Seek immediate care or call 911 if:   · You feel like you are going to faint      · Blood soaks through/saturates your bandage  · Your incision comes apart  · Your incision is red, swollen, or draining pus  · You cannot walk or move your joint, or the limb is numb  · Your arm or leg feels warm, tender, and painful  It may look swollen and red  · You have a seizure or feel confused  · You feel lightheaded, short of breath, and have chest pain  · You have chest pain when you take a deep breath or cough  You may cough up blood  © 2014 [address] is for End User's use only and may not be sold, redistributed or otherwise used for commercial purposes  All illustrations and images included in CareNotes® are the copyrighted property of A D [employer] M [employer]  or Patrick Coppola  The above information is an  only  It is not intended as medical advice for individual conditions or treatments  Talk to your doctor, nurse or pharmacist before following any medical regimen to see if it is safe and effective for you

## 2020-08-18 NOTE — PHYSICAL THERAPY NOTE
PT TREATMENT     08/18/20 1120   Pain Assessment   Pain Assessment Tool 0-10   Pain Score 4   Pain Location/Orientation Location: Knee;Orientation: Left   Restrictions/Precautions   LLE Weight Bearing Per Order WBAT   Other Precautions Pain; Fall Risk   General   Chart Reviewed Yes   Family/Caregiver Present No   Subjective   Subjective "stiff"   Bed Mobility   Supine to Sit 5  Supervision   Additional items Verbal cues   Sit to Supine 5  Supervision   Additional items Verbal cues   Transfers   Sit to Stand   (min A/S)   Additional items Assist x 1;Verbal cues   Stand to Sit 5  Supervision   Additional items Verbal cues   Ambulation/Elevation   Gait pattern Foward flexed; Antalgic; Slow phuc   Gait Assistance   (min A/S)   Additional items Assist x 1;Verbal cues; Tactile cues   Assistive Device Rolling walker   Distance 15 feet and 10 feet with change in direction;pt also on/off commode with S;pt is S for hygiene after urination  Stair Management Assistance   (min A/S)   Additional items Assist x 1;Verbal cues; Tactile cues   Stair Management Technique With cane; One rail R;Step to pattern   Number of Stairs 4   Balance   Static Sitting Good   Static Standing Fair +   Dynamic Standing Fair   Ambulatory   (F-/F)   Activity Tolerance   Activity Tolerance Patient limited by fatigue;Patient limited by pain   Exercises   Quad Sets 10 reps; Left;Supine   Heelslides AAROM;10 reps; Left;Supine   Knee Flexion Stretch 10 reps; Left;Sitting   Knee AROM Long Arc Quad AAROM;10 reps; Left;Sitting   Ankle Pumps 10 reps; Left;Supine   Assessment   Assessment Pt is making progess with bed mob, trans, amb with RW and gait on stairs  Pt with good tolerance to LLE exercises  Pt is safe for dc home this PM after 2nd session of PT and will benefit from cont skilled PT services as an OP  Plan   Treatment/Interventions ADL retraining;Functional transfer training;LE strengthening/ROM; Elevations; Therapeutic exercise; Endurance training;Patient/family training;Equipment eval/education; Bed mobility;Gait training; Compensatory technique education   PT Frequency Twice a day   Recommendation   PT Discharge Recommendation Other (Comment)  (OP PT)   Licensure   [place] License Number  Cheryl Li, [place] [id number]

## 2020-08-18 NOTE — ASSESSMENT & PLAN NOTE
Patient had history of bilateral leg swelling, most likely secondary to venous congestion and stasis  Possibly poor circulation  Patient had a workup for CHF as per the patient from her primary care physician which was negative  Patient does take Lasix chronically  We will resume Lasix 40 mg daily  Monitor BMP  Creatinine new increased from 0 8 to 1 1  If creatinine rises tomorrow, we will hold Lasix

## 2020-08-18 NOTE — OCCUPATIONAL THERAPY NOTE
Occupational Therapy Treatment Note       08/18/20 1453   Restrictions/Precautions   LLE Weight Bearing Per Order WBAT   Other Precautions Fall Risk;Pain   Pain Assessment   Pain Assessment Tool 0-10   Pain Score 5   Pain Location/Orientation Orientation: Left; Location: Knee   ADL   Grooming Assistance 5  Supervision/Setup   Grooming Deficit Wash/dry hands   Grooming Comments Standing unsupported to sink, min Verbal cues for safety awareness   Toileting Assistance  5  Supervision/Setup   Toileting Deficit Clothing management up;Clothing management down;Perineal hygiene   Toileting Comments Patient completed toileting in bathroom, close supervision to manage clothing in standing   Bed Mobility   Sit to Supine 5  Supervision   Additional items Increased time required   Transfers   Sit to Stand 5  Supervision   Additional items Increased time required   Stand to Sit 5  Supervision   Additional items Verbal cues   Functional Mobility   Functional Mobility 5  Supervision   Additional Comments Patient ambulated short household distance to/from bathroom with RW   Toilet Transfers   Toilet Transfer Type To and from   Toilet Transfer to Standard toilet   Toilet Transfer Technique Ambulating   Toilet Transfers Minimal assistance   Toilet Transfers Comments With RW, pt reports having higher toilet at home, min VCs for safe transfer technique   Cognition   Arousal/Participation Alert; Cooperative   Following Commands Follows all commands and directions without difficulty   Activity Tolerance   Activity Tolerance Patient limited by fatigue;Patient limited by pain   Assessment   Assessment Patient seen for BID OT treatment this PM  Patient with significant improvement in pain tolerance this PM and improvements in functional mobility and ADLs  Patient completed functional mobility/toilet transfe training with RW and supervision to min assist  Reviewed dressing techniques with patient used in AM session   Also reviewed with patient how to use reacher to assist with donning/doffing pants and underwear as patient has at home  Pt declined practice due to already being dressed but participated in simulated practice and verbalized good understanding of technique  Patient educated on safe tub transfer technique via sit pivot method with use of shower chair and recommendation for shower chair and where to purchase, patient verbalized good understanding  At end of session pt requesting to return to bed to rest, left supine in bed with all needs met  RN aware of patient status      Recommendation   OT Discharge Recommendation Return to previous environment with social support   Licensure   [place] License Number  Jakub Reilly, [place] [id number]

## 2020-08-18 NOTE — NURSING NOTE
Patient educated on administration of lovenox  Demonstrated how to administer medication to patient  Patient provided some verbalized teachback, but not very receptive, states that she takes coumadin  Explained that she might be discharged on lovenox and restart coumadin at a later date, patient not receptive

## 2020-08-18 NOTE — ASSESSMENT & PLAN NOTE
Chronic status post pacemaker placement, on anticoagulation with Coumadin  Currently rate control  Anticoagulation on hold  · Continue metoprolol  · Continue Lovenox for DVT prophylaxis as per orthopedic  · Resume back to anticoagulation when cleared by Orthopedic team

## 2020-08-20 ENCOUNTER — OFFICE VISIT (OUTPATIENT)
Dept: PHYSICAL THERAPY | Facility: CLINIC | Age: 80
End: 2020-08-20
Payer: MEDICARE

## 2020-08-20 ENCOUNTER — TELEPHONE (OUTPATIENT)
Dept: OBGYN CLINIC | Facility: HOSPITAL | Age: 80
End: 2020-08-20

## 2020-08-20 DIAGNOSIS — M25.562 CHRONIC PAIN OF LEFT KNEE: ICD-10-CM

## 2020-08-20 DIAGNOSIS — M17.0 PRIMARY OSTEOARTHRITIS OF BOTH KNEES: Primary | ICD-10-CM

## 2020-08-20 DIAGNOSIS — Z01.812 PRE-OPERATIVE LABORATORY EXAMINATION: ICD-10-CM

## 2020-08-20 DIAGNOSIS — G89.29 CHRONIC PAIN OF LEFT KNEE: ICD-10-CM

## 2020-08-20 PROCEDURE — 97110 THERAPEUTIC EXERCISES: CPT | Performed by: PHYSICAL THERAPIST

## 2020-08-20 NOTE — PROGRESS NOTES
PT Re-Evaluation    Today's date: 2020  Patient name: Berta Solano  : 1940  MRN: [id number]  Referring provider: Juancarlos Gutiérrez  Dx:   Encounter Diagnosis     ICD-10-CM    1  Primary osteoarthritis of both knees  M17 0    2  Chronic pain of left knee  M25 562     G89 29    3  Pre-operative laboratory examination  Z01 812                   Assessment  Assessment details: Pt is a pleasant 70 y o  female who presents to [facility] for pre-op of L TKA prior to surgery on 20  Today, she presents with expected limitations of decreased L knee ROM and joint mobility, decreased B LE and core strength, high self reports of pain, and decreased tolerance to activity  Functionally, she is limited in her ability to stand and ambulate, negotiate stairs, perform age appropriate recreation and exercise, and perform normal household duties  She is very motivated to improve  Pt will benefit from skilled PT to address the aforementioned deficits and limitations in an effort to maximize pain free functional mobility and overall quality of life  Progress as able with these goals in mind  Impairments: abnormal gait, abnormal muscle firing, abnormal muscle tone, abnormal or restricted ROM, abnormal movement, activity intolerance, impaired balance, impaired physical strength, lacks appropriate home exercise program and pain with function  Understanding of Dx/Px/POC: good   Prognosis: good    Goals  Short term goals (4 weeks from post op visit):  1) Pt will improve post-op L knee A ROM to 0-115 pain free  2) Pt will improve B LE and core strength deficits by 1/3 grade MMT  3) Pt will reports pain at worse <5/10  4) Pt will initiate and progress HEP w/ special emphasis on functional L knee ROM and B LE/core strength  Long term goals (8 weeks)  1) Pt will improve FOTO to at least 20 points higher than initial post op score    2) Pt will stand and ambulate for durations of 20 min w/ normalized gait mechanics and no pain  3) Pt will perform full week of functional activities to include stair negotiation, home activities, and recreation w/ L knee pain <4/10  4) Pt will be independent and compliant w/ HEP in order to maximize functional benefit of skilled PT following d/c        Plan  Plan details: POC to include: TKA program    Patient would benefit from: skilled PT  Planned modality interventions: cryotherapy  Planned therapy interventions: abdominal trunk stabilization, activity modification, joint mobilization, manual therapy, massage, motor coordination training, neuromuscular re-education, patient education, postural training, stretching, strengthening, therapeutic activities, therapeutic exercise, therapeutic training, transfer training, home exercise program, gait training, graded activity, functional ROM exercises, graded exercise, flexibility, body mechanics training, balance and balance/weight bearing training  Frequency: 2x week  Duration in visits: 16  Duration in weeks: 8  Treatment plan discussed with: patient        Subjective Evaluation    History of Present Illness  Date of surgery: 8/17/2020  Mechanism of injury: Pt is here today for pre-op prior to L TKA on 8/17/20  Pt has had L knee pain for the last 5 years  Notes that she fell onto that side multiple times in the last 4-5 years  Notes that R knee hurts as well but L is worse  Main goal is to be able to walk and and move around with family and friends pain free  Pt notes that sleeping is not a problem  Stairs and prolonged WB are very difficult and painful  She is very excited to get through surgery and get back to PT  Lives w/   Functional status below:    8/20/20  She underwent Left TKA on Monday and stayed in the hospital one night  She was discharged home the next day  She notes when she got home she fell onto left leg in the grass  She has no extra pain following the fall    She has been referred to PT  Quality of life: good    Pain  Current pain ratin  At best pain ratin  At worst pain ratin  Location: L knee medial>lateral  Quality: dull ache, sharp and throbbing  Relieving factors: rest, medications and change in position  Aggravating factors: standing, walking, stair climbing, lifting and running  Progression: worsening    Social Support  Steps to enter house: yes  Stairs in house: yes   Lives in: multiple-level home  Lives with: spouse    Employment status: not working (retired)  Patient Goals  Patient goals for therapy: increased strength, decreased pain, increased motion and return to sport/leisure activities  Patient goal: be ablw to walking without pain or limp! Get back to going out with friends without pain! Objective     Palpation   Left   Hypertonic in the distal biceps femoris, distal semimembranosus, distal semitendinosus and vastus lateralis       Neurological Testing     Sensation     Knee   Left Knee   Intact: light touch    Right Knee   Intact: light touch     Active Range of Motion   Left Knee   Flexion: 71 degrees   Extension: -9 degrees     Right Knee   Flexion: 125 degrees   Extension: 0 degrees     Passive Range of Motion   Left Knee   Flexion: 75 degrees   Extension: -6 degrees     Right Knee   Flexion: 130 degrees   Extension: 0 degrees     Mobility   Patellar Mobility:   Left Knee   Hypomobile: left medial, left lateral, left superior and left inferior    Strength/Myotome Testing     Left Knee   Flexion: 3-  Extension: 3-    Right Knee   Flexion: 4-  Extension: 4-    Additional Strength Details  Hips grossly 4-/5 throughout    Pt shows poor to fair quad contraction on L, good on R    SLR on L x 5 before loss of TKE    Swelling     Left Knee Girth Measurement (cm)   Joint line: 43 8 cm    Right Knee Girth Measurement (cm)   Joint line: 43 1 cm    Ambulation   Weight-Bearing Status   Weight-Bearing Status (Left): weight-bearing as tolerated   Assistive device used: four-wheeled walker    Ambulation: Level Surfaces     Additional Level Surfaces Ambulation Details  Poor WB on L LE, decreased lumbar rotation, min antalgic w/ increased time to complete, fatigues quickly     Functional Assessment        Comments  Sit<>stand: requires UE support on LE and R sided WS throughout       BW squat: unable     Stairs: TBA    SLS:  Unable on L side today      Flowsheet Rows      Most Recent Value   PT/OT G-Codes   Current Score  50   Projected Score  63             Precautions: B diabetic neuropathy, DM II    Date (Visit #) 8/11 (1) pre-op 8/20/20      Manual              DTM and TPR                                                                        Exercise Diary              Ther Ex        Active w/u             PROM    5 min         HS/glute/piri stretch             QS, SLR, hip abd    QS   20                         Neuro Re Ed        bridging             TrA progressions             Squat training             Hip Abd Progressions             Balance                         Ther Act             stairs             gait             Sit<>stand    5x          Ankle pumps    30          Heel slides    20          Knee ext    10                            Reviewed current HEP                          Modalities             heat             Ice

## 2020-08-21 ENCOUNTER — TELEPHONE (OUTPATIENT)
Dept: OBGYN CLINIC | Facility: HOSPITAL | Age: 80
End: 2020-08-21

## 2020-08-21 NOTE — TELEPHONE ENCOUNTER
Spoke to patient for postoperative follow-up assessment  She reports that  PT is going well  Pain is "a little better than yesterday"  Pt reports her pain is 4/10  She reports mild swelling and confirms she is icing multiple times per day  She reports her dressing is clean and dry free of bleeding or drainage  She reports she is walking with her walker and denies any falls  Pt reports for pain, she is took 1 tablet of oxycodone today r/t aching of knee  Taking it only as needed for severe pain, understands this is to be taken as needed 4-6 hours for severe pain  Pt reports she took tylenol 1300 once today  Instructed per AVS, Take 3 tablets (975 mg total) by mouth every 8 hours, pt verbalizes understanding  LBM 8/21, denies bloody stools  Pt confirms she is passing gas, denies abdominal pain or vomiting  Pt confirms she is taking colace 100mg twice daily  AVS, AVS med list and F/Us reviewed with pt  Pt reporting her Lovenox was not started on 8/19 as instructed, she missed that dose    "was not ready at pharmacy, pharmacy had an issue" per pt  She reports she Started lovenox 40mg injection on 8/20 at 1830  I spoke with Dr Natalio Oakley through tiger text asking him for clarification with lovenox bridge and restarting warfarin r/t missed 8/19 lovenox dose  Per Dr Natalio Oakley, "continue with plan as previously recommended  She may discontinue lovenox when her coumadin/INR is therapeutic per cardiology recs"  Pt was advised plan is as previously recommended and plans to restart her coumadin as planned on AVS, restart 8/22 and lovenox scheduled to stop 8/23-- pt advised reach out to cardiologist/coumadin clinic for them to maintain INR and make additional recommendations, patient plans to call them once off phone  Pt confirms she is taking her other medications as listed on her AVS except what is listed above and Not using pulmicort, only PRN per pt      Pt aware of her follow up appts and denies barriers to attending  Patient was encouraged to review her AVS paperwork  Pt denies having any concerning symptoms to her  She denies having any  CP, SOB, dizziness, calf pain, fevers or leg redness  Patient denies having any additional questions or concerns at this time    Pt encouraged to call me with any questions, concerns or issues

## 2020-08-25 ENCOUNTER — OFFICE VISIT (OUTPATIENT)
Dept: PHYSICAL THERAPY | Facility: CLINIC | Age: 80
End: 2020-08-25
Payer: MEDICARE

## 2020-08-25 ENCOUNTER — TELEPHONE (OUTPATIENT)
Dept: OBGYN CLINIC | Facility: HOSPITAL | Age: 80
End: 2020-08-25

## 2020-08-25 DIAGNOSIS — G89.29 CHRONIC PAIN OF LEFT KNEE: ICD-10-CM

## 2020-08-25 DIAGNOSIS — M17.0 PRIMARY OSTEOARTHRITIS OF BOTH KNEES: Primary | ICD-10-CM

## 2020-08-25 DIAGNOSIS — M25.562 CHRONIC PAIN OF LEFT KNEE: ICD-10-CM

## 2020-08-25 PROCEDURE — 97112 NEUROMUSCULAR REEDUCATION: CPT

## 2020-08-25 PROCEDURE — 97530 THERAPEUTIC ACTIVITIES: CPT

## 2020-08-25 PROCEDURE — 97110 THERAPEUTIC EXERCISES: CPT

## 2020-08-25 NOTE — TELEPHONE ENCOUNTER
Pt calling in to discuss constipation, she left me a VM and I called her back to discuss  Pt reporting her LBM was 8/21:  Pt reports she is passing little gas but denies nausea/vomiting/abdominal pain  Pt reporting she feels like "I have a turd ready to come out but it does not come, I feel fine otherwise"  Pt confirms she still is taking colace 100mg twice daily  Pt was advised on a BM plan:   Continue to Take Colace 100mg BID  Increase oral fluids to 6-8 glasses of water/day  Increase ambulation frequency  Eat small, frequent, meals high in protein and fiber  Eat Prunes or Drink prune juice  Use OTC glycerin suppository OR OTC fleet enema, following directions on the back of the package and consulting [facility] with a med list to verify appropriate        Pt reports she has used glycerin suppositories in past with success  Also reports she Has an OTC fleet enema that she will consider using left over from pre-op  Pt reporting "everything else is ok, doing very well with the knee"  Pt denies having additional questions or concerns at this time  I requested a call back with updates this afternoon or tomorrow, also advised her we can consider consulting her PCP to assist with constipation if above plan does not work

## 2020-08-25 NOTE — TELEPHONE ENCOUNTER
Pt reports she had multiple BMs today and does not need miralax, glycerin suppositories or enema  Pt plans to continue to take colace 100mg twice daily  Pt reports she is struggling with pain control, especially after PT  Pt reports she is taking Tylenol 650mg, only as needed- recd 975mg every 8 hours per AVS   Pt reports having 650mg tablets at home  pt instructed she can take 1300mg twice daily  She reports she is taking  Oxycodone 1 tablet "not really often, taking maybe twice daily"  I also recommended 5mg oxycodone that can take every 4 hours for severe pain  Pt verbalizes understanding  Pt denies having further questions or needs at this time  Pt encouraged to call me with any questions, concerns or issues

## 2020-08-25 NOTE — PROGRESS NOTES
Daily Note     Today's date: 2020  Patient name: Anthony Coleman  : 1940  MRN: [id number]  Referring provider: Gage Palencia  Dx:   Encounter Diagnosis     ICD-10-CM    1  Primary osteoarthritis of both knees  M17 0    2  Chronic pain of left knee  M25 562     G89 29                   Subjective: Pt notes that her knee already feels like it is bending better but that it has been painful at times  Wakes her up at night  Notes some constipation, has notified MD about this  Objective: Pt requires cueing for proper WS and equal WB w/ squat training, quick onset of discomfort so discontinued past 10 reps  Assessment: Tolerated treatment well  Patient demonstrated fatigue post treatment and would benefit from continued PT  Pt tolerates initial strength and gait training progressions well, noting fatigue by end of session but no increase in pain  She is appropriate for continued increases in challenge as sx allow  We discussed how the nature of her sx are exactly in line w/ one week s/p TKA  She was happy to here this  Progress as appropriate  Plan: Continue per plan of care   Stairs, higher level gait, squats, balance        Precautions: B diabetic neuropathy, DM II    Date (Visit #)  (1) pre-op 20 (2)  (3)     Manual              DTM and TPR                                                              PROM 2-85          Exercise Diary              Ther Ex        Active w/u      NU step mid session LE only lvl 1 x 8min       PROM    5 min  x10 min total throughout for flex/ext       HS/glute/piri stretch      3x30 sec total on R LE        QS, SLR, hip abd    QS   20  QS 2x20 total          SAQ 2x20        Ad sq 2x20l     Neuro Re Ed        bridging      w/ ad sq 2x10       TrA progressions                  WS practice x 2-3 mins       Hip Abd Progressions             Balance            Heel raises w/ focus on ecc x30 total        AA SLR x 5 tota     Ther Act stairs             gait             Sit<>stand    5x x5 throughout        Ankle pumps    30  squats in // bars x10-15 total        Heel slides    20  reg gait x 4-5 laps in // bars, exag gait x 4-5 laps        Knee ext    10  standing end range hip and knee flex x 10-15 total                          Reviewed current HEP  review of all x 2-3 mins                         Modalities             heat             Ice

## 2020-08-26 ENCOUNTER — TELEPHONE (OUTPATIENT)
Dept: GASTROENTEROLOGY | Facility: CLINIC | Age: 80
End: 2020-08-26

## 2020-08-26 NOTE — TELEPHONE ENCOUNTER
----- Message from Valencia Wakefield MD sent at 8/15/2020  2:14 PM EDT -----  Please inform the patient that of the 3 polyps, only 1 was tubular adenoma, the others were benign, would recommend repeat colonoscopy in 3 years if in good health  EGD did not show any evidence of celiac disease, no evidence of H pylori organism on gastric biopsies  No need for repeat EGD at this time

## 2020-08-26 NOTE — LETTER
August 26, 2020     [address]   [place]      Dear Ms Ortiz: We have attempted to reach you regarding your results  We ask that you please contact our office upon receipt of this letter to receive your results  Thank you in advance for your cooperation and assistance          Sincerely,   [facility] Staff  [phone]

## 2020-08-27 ENCOUNTER — OFFICE VISIT (OUTPATIENT)
Dept: PHYSICAL THERAPY | Facility: CLINIC | Age: 80
End: 2020-08-27
Payer: MEDICARE

## 2020-08-27 DIAGNOSIS — G89.29 CHRONIC PAIN OF LEFT KNEE: ICD-10-CM

## 2020-08-27 DIAGNOSIS — M17.0 PRIMARY OSTEOARTHRITIS OF BOTH KNEES: Primary | ICD-10-CM

## 2020-08-27 DIAGNOSIS — M25.562 CHRONIC PAIN OF LEFT KNEE: ICD-10-CM

## 2020-08-27 PROCEDURE — 97112 NEUROMUSCULAR REEDUCATION: CPT

## 2020-08-27 PROCEDURE — 97110 THERAPEUTIC EXERCISES: CPT

## 2020-08-27 PROCEDURE — 97530 THERAPEUTIC ACTIVITIES: CPT

## 2020-08-27 NOTE — PROGRESS NOTES
Daily Note     Today's date: 2020  Patient name: Tiago Richmond  : 1940  MRN: [id number]  Referring provider: Johana Mckeon  Dx:   Encounter Diagnosis     ICD-10-CM    1  Primary osteoarthritis of both knees  M17 0    2  Chronic pain of left knee  M25 562     G89 29                   Subjective: Pt reports same level of soreness as last session, feels that she is improving and moving better overall  Objective: Pt requires cueing for greater degree of hip and knee flexion during marching exercises in // bars  Quick fatigue noted  Improved squat mechanics following cueing and visual demo  Assessment: Tolerated treatment well  Patient demonstrated fatigue post treatment and would benefit from continued PT  Pt reports fatigue but no increase in pain by end of session  She does well w/ increases in challenge, showing improved equality of WB in // bars over short distances w/o UE support  HEP updated to reflect current program  Progress as sx allow         Plan: Continue per plan of care  hip abd work if able, introduce stair work as able      Precautions: B diabetic neuropathy, DM II    Date (Visit #)  (1) pre-op 20 (2)  (3)  (4)    Manual              DTM and TPR                                                              PROM 2-85  PROM 2-95        Exercise Diary              Ther Ex        Active w/u      NU step mid session LE only lvl 1 x 8min NU step pre 9 min lvl 1     PROM    5 min  x10 min total throughout for flex/ext  x10 min total throughout manual and self      HS/glute/piri stretch      3x30 sec total on R LE   3x30 sec on R LE     QS, SLR, hip abd    QS   20  QS 2x20 total  QS x 20 total        SAQ 2x20 SLR AA x10-15 total       Ad sq 2x20l     Neuro Re Ed        bridging      w/ ad sq 2x10 Reg x 20     TrA progressions                  WS practice x 2-3 mins  review      Hip Abd Progressions             Balance            Heel raises w/ focus on ecc x30 total At bar x 20       AA SLR x 5 tota above                                    Ther Act             stairs             gait             Sit<>stand    5x x5 throughout  x10 throughout       Ankle pumps    30  squats in // bars x10-15 total  in // bars x 20 total      Heel slides    20  reg gait x 4-5 laps in // bars, exag gait x 4-5 laps Reg x 4, marching x 4 laps, exag side step x 2-3 laps       Knee ext    10  standing end range hip and knee flex x 10-15 total  review                         Reviewed current HEP  review of all x 2-3 mins   review                       Modalities             heat             Ice

## 2020-08-31 ENCOUNTER — TELEPHONE (OUTPATIENT)
Dept: OBGYN CLINIC | Facility: HOSPITAL | Age: 80
End: 2020-08-31

## 2020-08-31 NOTE — TELEPHONE ENCOUNTER
Patient left me a VM asking two things:    1) if she can stop wearing her DARRYL stockings at this time  2 ) if she could have a refill of oxycodone  she has 2 tablets left of oxycodone and would like a refill if surgeon agreeable      Sending to the surgeon's team

## 2020-09-01 ENCOUNTER — APPOINTMENT (OUTPATIENT)
Dept: PHYSICAL THERAPY | Facility: CLINIC | Age: 80
End: 2020-09-01
Payer: MEDICARE

## 2020-09-02 ENCOUNTER — APPOINTMENT (OUTPATIENT)
Dept: RADIOLOGY | Facility: CLINIC | Age: 80
End: 2020-09-02
Payer: MEDICARE

## 2020-09-02 ENCOUNTER — OFFICE VISIT (OUTPATIENT)
Dept: OBGYN CLINIC | Facility: CLINIC | Age: 80
End: 2020-09-02

## 2020-09-02 VITALS
BODY MASS INDEX: 36.75 KG/M2 | HEART RATE: 96 BPM | HEIGHT: 65 IN | TEMPERATURE: 95.9 F | SYSTOLIC BLOOD PRESSURE: 115 MMHG | WEIGHT: 220.6 LBS | DIASTOLIC BLOOD PRESSURE: 83 MMHG

## 2020-09-02 DIAGNOSIS — Z96.652 STATUS POST TOTAL KNEE REPLACEMENT USING CEMENT, LEFT: Primary | ICD-10-CM

## 2020-09-02 DIAGNOSIS — Z47.1 AFTERCARE FOLLOWING LEFT KNEE JOINT REPLACEMENT SURGERY: ICD-10-CM

## 2020-09-02 DIAGNOSIS — Z96.652 STATUS POST TOTAL KNEE REPLACEMENT USING CEMENT, LEFT: ICD-10-CM

## 2020-09-02 DIAGNOSIS — Z96.652 AFTERCARE FOLLOWING LEFT KNEE JOINT REPLACEMENT SURGERY: ICD-10-CM

## 2020-09-02 PROCEDURE — 99024 POSTOP FOLLOW-UP VISIT: CPT | Performed by: ORTHOPAEDIC SURGERY

## 2020-09-02 PROCEDURE — 73562 X-RAY EXAM OF KNEE 3: CPT

## 2020-09-02 RX ORDER — OXYCODONE HYDROCHLORIDE 5 MG/1
TABLET ORAL
Qty: 30 TABLET | Refills: 0 | Status: SHIPPED | OUTPATIENT
Start: 2020-09-02 | End: 2020-09-10 | Stop reason: SDUPTHER

## 2020-09-02 NOTE — PROGRESS NOTES
Assessment/Plan:  1  Status post total knee replacement using cement, left  XR knee 3 vw left non injury   2  Aftercare following left knee joint replacement surgery       Scribe Attestation    I,:   Tamela Anders am acting as a scribe while in the presence of the attending physician :        I,:   Matty Duran, DO personally performed the services described in this documentation    as scribed in my presence :          Surya Montes is a pleasant 42-year-old female who presents today for follow-up evaluation two weeks status post left total knee arthroplasty  I am very pleased with her imaging and her clinical presentation today in the office  I would like her to continue her efforts at formal physical therapy at least two days per week  She understands that she may shower at this time but should avoid soaking or scrubbing her incision  We did spend time discussing appropriate dosing for Tylenol and her narcotic pain medication  I did provide her with a refill prescription for oxycodone today in the office  We will see her back in the office in four weeks for repeat clinical evaluation  Subjective: Follow-up evaluation two weeks status post left total knee arthroplasty    Patient ID: Tam Camarillo is a 70 y o  female who presents today for follow-up evaluation two weeks status post left total knee arthroplasty  She states that she has been doing well  She has been participating in formal physical therapy two days per week  She states that she was compliant with her Lovenox bridging and has now resumed using Coumadin  She does state that she experiences aching pain about her left knee, particularly when performing her therapy exercises  She has been using her narcotic pain medication as prescribed and is requesting a refill today  She denies any new injury or trauma  She denies any fever, chills, sweats or other symptoms of infection  She denies any calf tenderness        Review of Systems Constitutional: Positive for activity change  Negative for chills, fever and unexpected weight change  HENT: Negative for hearing loss, nosebleeds and sore throat  Eyes: Negative for pain, redness and visual disturbance  Respiratory: Negative for cough, shortness of breath and wheezing  Cardiovascular: Negative for chest pain, palpitations and leg swelling  Gastrointestinal: Negative for abdominal pain, nausea and vomiting  Endocrine: Negative for polydipsia and polyuria  Genitourinary: Negative for dysuria and hematuria  Musculoskeletal: Positive for arthralgias and myalgias  Negative for joint swelling  See HPI   Skin: Negative for rash and wound  Neurological: Negative for dizziness, numbness and headaches  Psychiatric/Behavioral: Negative for decreased concentration and suicidal ideas  The patient is not nervous/anxious            Past Medical History:   Diagnosis Date    [facility]     Carrero's esophagus     last assessed: 1/23/2018    BRCA1 negative     BRCA2 negative     Breast cancer ([facility] ) 2006    stage 1 (left), given adjuvant radiation with Arimidex x 5 years    Cancer [facility]) 2006    Left Breast, Lumpectomy    Cataract     last assessed: 3/11/2014    Dysplasia of toenail     last assessed: 8/29/2017    Esophageal reflux     GERD (gastroesophageal reflux disease)     Gross hematuria     last assessed: 2/19/2015    Hematuria     Hiatal hernia     History of radiation therapy     Hypertension     Irregular heart beat     AFIB    Mixed sensory-motor polyneuropathy     Neuropathy     Obesity     Pacemaker     Paroxysmal atrial fibrillation (HCC)     Peripheral neuropathy     Rectal bleeding     Restless leg syndrome     Shortness of breath     last assessed: 1/11/2016       Past Surgical History:   Procedure Laterality Date    BREAST BIOPSY Left 2006    BREAST LUMPECTOMY Left 2006    onset: 2006    BREAST SURGERY      CARDIAC PACEMAKER PLACEMENT      x 3 2006    CATARACT EXTRACTION      COLONOSCOPY      CYSTOSCOPY  2014    diagnostic    HYSTERECTOMY      ALISON BSO; due to fibroid uterus; age 38    KNEE CARTILAGE SURGERY      excision lesion of meniscus or capsule knee    KNEE SURGERY      OOPHORECTOMY Bilateral     OTHER SURGICAL HISTORY      radiation therapy    VT COLONOSCOPY FLX DX W/COLLJ SPEC WHEN PFRMD N/A 2017    Procedure: COLONOSCOPY;  Surgeon: Elliott Multani MD;  [facility]: [facility]; Service: Gastroenterology    VT ESOPHAGOGASTRODUODENOSCOPY TRANSORAL DIAGNOSTIC N/A 2017    Procedure: ESOPHAGOGASTRODUODENOSCOPY (EGD); Surgeon: Yamilet Mitchell MD;  Location: [facility];   Service: Gastroenterology    VT TOTAL KNEE ARTHROPLASTY Left 2020    Procedure: ARTHROPLASTY KNEE TOTAL;  Surgeon: Cruz Silvestre DO;  Location: [address] [place] [address];  Service: Orthopedics    UPPER GASTROINTESTINAL ENDOSCOPY         Family History   Problem Relation Age of Onset    Hypertension Mother     Heart disease Father     Aneurysm Father     Coronary artery disease Father         in his 76s with aneurysm    Aortic aneurysm Father         abdominal    Scleroderma Sister     Breast cancer Sister 76    Hypertension Sister     Cancer Sister     No Known Problems Son     No Known Problems Son     Testicular cancer Son 39    Thyroid cancer Son 45    Colon cancer Maternal Aunt     Colon cancer Maternal Aunt     Breast cancer Other 48        kaylee's daughter    Alcohol abuse Neg Hx     Substance Abuse Neg Hx     Mental illness Neg Hx     Depression Neg Hx        Social History     Occupational History    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      Comment: retired   Tobacco Use    Smoking status: Former Smoker     Packs/day: 1 00     Years: 25 00     Pack years: 25 00     Types: Cigarettes     Last attempt to quit: 1980     Years since quittin 9    Smokeless tobacco: Never Used    Tobacco comment: Quit over 30 years ago; quit age 41   Substance and Sexual Activity    Alcohol use: Not Currently    Drug use: No    Sexual activity: Not on file         Current Outpatient Medications:     acetaminophen (TYLENOL) 325 mg tablet, Take 3 tablets (975 mg total) by mouth every 8 (eight) hours, Disp: 30 tablet, Rfl: 0    budesonide (PULMICORT) 90 MCG/ACT inhaler, Inhale 1-2 puffs 2 (two) times a day, Disp: 1 Inhaler, Rfl: 0    Calcium Acetate, Phos Binder, (CALCIUM ACETATE PO), Take by mouth daily  , Disp: , Rfl:     clobetasol (TEMOVATE) 0 05 % ointment, Apply once weekly to the affected area, Disp: 30 g, Rfl: 3    docusate sodium (COLACE) 100 mg capsule, Take 1 capsule (100 mg total) by mouth 2 (two) times a day, Disp: 60 capsule, Rfl: 0    famotidine (PEPCID) 40 MG tablet, Take 1 tablet (40 mg total) by mouth daily, Disp: 30 tablet, Rfl: 3    furosemide (LASIX) 40 mg tablet, Take 1 tablet (40 mg total) by mouth daily, Disp: 90 tablet, Rfl: 3    gabapentin (NEURONTIN) 800 mg tablet, Take 1 tablet (800 mg total) by mouth 4 (four) times a day, Disp: 360 tablet, Rfl: 1    loratadine (CLARITIN) 10 mg tablet, Take 10 mg by mouth daily, Disp: , Rfl:     metoprolol tartrate (LOPRESSOR) 50 mg tablet, Take 1 tablet (50 mg total) by mouth 2 (two) times a day, Disp: 180 tablet, Rfl: 0    multivitamin (THERAGRAN) TABS, Take 1 tablet by mouth daily, Disp: , Rfl:     pramipexole (MIRAPEX) 0 5 mg tablet, One and half tablet at 5:00 p m  and 10:00 p m , Disp: 360 tablet, Rfl: 1    warfarin (COUMADIN) 7 5 mg tablet, Take 1 tablet (7 5 mg total) by mouth daily, Disp: 90 tablet, Rfl: 3    enoxaparin (LOVENOX) 40 mg/0 4 mL, Inject 0 4 mL (40 mg total) under the skin daily for 4 days, Disp: 1 6 mL, Rfl: 0    oxyCODONE (ROXICODONE) 5 mg immediate release tablet, Take 1/2 to 1 tablets by mouth every 4-6 hours as needed for pain (Patient not taking: Reported on 9/2/2020), Disp: 30 tablet, Rfl: 0    Allergies   Allergen Reactions  Cephalexin Rash    Duloxetine Hcl Other (See Comments)     Facial pins and needles sensation    Erythromycin Rash    Levofloxacin Other (See Comments)     Muscular aches    Penicillins Rash    Savella [Milnacipran] Rash    Sulfa Antibiotics Rash       Objective:  Vitals:    09/02/20 1125   BP: 115/83   Pulse: 96   Temp: (!) 95 9 °F (35 5 °C)       Body mass index is 37 28 kg/m²  Left Knee Exam     Range of Motion   Left knee extension: 0  Left knee flexion: 90  Tests   Varus: negative Valgus: negative  Drawer:  Anterior - negative     Posterior - negative    Other   Erythema: absent  Scars: present (Healing anterior operative incision that is clean, dry, intact and shows no signs of infection or dehiscence)  Sensation: normal  Pulse: present  Swelling: mild (Appropriate for stage in postoperative course)  Effusion: no effusion present    Comments:  Stable at 0, 30 90°  Neurovascularly intact distally  No warmth, erythema or signs of infection  Patella tracks midline and flat without crepitance  Calf is soft and nontender          Observations   Left Knee   Negative for effusion  Physical Exam  Vitals signs and nursing note reviewed  Constitutional:       Appearance: She is well-developed  HENT:      Head: Normocephalic and atraumatic  Eyes:      Conjunctiva/sclera: Conjunctivae normal       Pupils: Pupils are equal, round, and reactive to light  Neck:      Musculoskeletal: Normal range of motion and neck supple  Cardiovascular:      Rate and Rhythm: Normal rate  Pulmonary:      Effort: Pulmonary effort is normal  No respiratory distress  Musculoskeletal:      Left knee: She exhibits no effusion  Comments: As noted in HPI   Skin:     General: Skin is warm and dry  Neurological:      Mental Status: She is alert and oriented to person, place, and time  Psychiatric:         Behavior: Behavior normal          I have personally reviewed pertinent films in PACS      X-ray of the left knee obtained on 09/02/2020 demonstrates a well-positioned well-aligned total knee arthroplasty without evidence of failure  There is no acute fracture, dislocation, lytic or blastic lesion

## 2020-09-03 ENCOUNTER — ANTICOAG VISIT (OUTPATIENT)
Dept: CARDIOLOGY CLINIC | Facility: CLINIC | Age: 80
End: 2020-09-03

## 2020-09-03 ENCOUNTER — TRANSCRIBE ORDERS (OUTPATIENT)
Dept: LAB | Facility: CLINIC | Age: 80
End: 2020-09-03

## 2020-09-03 ENCOUNTER — OFFICE VISIT (OUTPATIENT)
Dept: PHYSICAL THERAPY | Facility: CLINIC | Age: 80
End: 2020-09-03
Payer: MEDICARE

## 2020-09-03 ENCOUNTER — APPOINTMENT (OUTPATIENT)
Dept: LAB | Facility: CLINIC | Age: 80
End: 2020-09-03
Payer: MEDICARE

## 2020-09-03 DIAGNOSIS — I48.19 PERSISTENT ATRIAL FIBRILLATION (HCC): ICD-10-CM

## 2020-09-03 DIAGNOSIS — M25.562 CHRONIC PAIN OF LEFT KNEE: ICD-10-CM

## 2020-09-03 DIAGNOSIS — G89.29 CHRONIC PAIN OF LEFT KNEE: ICD-10-CM

## 2020-09-03 DIAGNOSIS — M17.0 PRIMARY OSTEOARTHRITIS OF BOTH KNEES: Primary | ICD-10-CM

## 2020-09-03 PROCEDURE — 97110 THERAPEUTIC EXERCISES: CPT

## 2020-09-03 PROCEDURE — 97112 NEUROMUSCULAR REEDUCATION: CPT

## 2020-09-03 NOTE — PROGRESS NOTES
Daily Note     Today's date: 9/3/2020  Patient name: Rl Titus  : 1940  MRN: [id number]  Referring provider: Hilario Cohen  Dx:   Encounter Diagnosis     ICD-10-CM    1  Primary osteoarthritis of both knees  M17 0    2  Chronic pain of left knee  M25 562     G89 29                   Subjective: Pt reports that she is making good progress  MD follow up went well, reports he was happy w/ her improvement thus far  Pt was unable to attend last session as there was unexpected construction on her street and she was blocked in       Objective: Pt requires mod/max UE support and cueing for 4" step up on L side, corrects and is able to continue w/ proper form despite fatigue  Assessment: Tolerated treatment well  Patient demonstrated fatigue post treatment and would benefit from continued PT  Soreness but no acute pain to start session  She does well w/ higher level strength training, she will benefit from continued progressions as sx allow  Focus on functional strength that can be replicated at home  Plan: Continue per plan of care   stairs, higher level gait/balance     Precautions: B diabetic neuropathy, DM II    Date (Visit #)  (1) pre-op 20 (2)  (3)  (4) 9/3 (5)   Manual              DTM and TPR                                                              PROM 2-85  PROM 2-95  PROM 0-109      Exercise Diary              Ther Ex        Active w/u      NU step mid session LE only lvl 1 x 8min NU step pre 9 min lvl 1  NU step pre lvl 2-3 x 9 min   PROM    5 min  x10 min total throughout for flex/ext  x10 min total throughout manual and self   x8 min for flex/ext    HS/glute/piri stretch      3x30 sec total on R LE   3x30 sec on R LE  3x30 sec on R    QS, SLR, hip abd    QS   20  QS 2x20 total  QS x 20 total QS x 10       SAQ 2x20 SLR AA x10-15 total AA 2x5      Ad sq 2x20l  x20   Neuro Re Ed        bridging      w/ ad sq 2x10 Reg x 20 2x10 w/ ad sq   TrA progressions WS practice x 2-3 mins  review   reiew    Hip Abd Progressions             Balance            Heel raises w/ focus on ecc x30 total At bar x 20 At bar x 20      AA SLR x 5 tota above above        6" stair taps x 10, 4" step ups x10-15 w/ therapist assist        Squats at bar x 10        March at bar 2x10           Ther Act             stairs             gait             Sit<>stand    5x x5 throughout  x10 throughout       Ankle pumps    30  squats in // bars x10-15 total  in // bars x 20 total      Heel slides    20  reg gait x 4-5 laps in // bars, exag gait x 4-5 laps Reg x 4, marching x 4 laps, exag side step x 2-3 laps   reg x 4 laps    Knee ext    10  standing end range hip and knee flex x 10-15 total  review                         Reviewed current HEP  review of all x 2-3 mins   review   review                     Modalities             heat             Ice

## 2020-09-08 ENCOUNTER — OFFICE VISIT (OUTPATIENT)
Dept: PHYSICAL THERAPY | Facility: CLINIC | Age: 80
End: 2020-09-08
Payer: MEDICARE

## 2020-09-08 DIAGNOSIS — M25.562 CHRONIC PAIN OF LEFT KNEE: ICD-10-CM

## 2020-09-08 DIAGNOSIS — M17.0 PRIMARY OSTEOARTHRITIS OF BOTH KNEES: Primary | ICD-10-CM

## 2020-09-08 DIAGNOSIS — G89.29 CHRONIC PAIN OF LEFT KNEE: ICD-10-CM

## 2020-09-08 PROCEDURE — 97110 THERAPEUTIC EXERCISES: CPT

## 2020-09-08 PROCEDURE — 97140 MANUAL THERAPY 1/> REGIONS: CPT

## 2020-09-08 NOTE — PROGRESS NOTES
Daily Note     Today's date: 2020  Patient name: Kelsie Caballero  : 1940  MRN: [id number]  Referring provider: Gela Arnett  Dx:   Encounter Diagnosis     ICD-10-CM    1  Primary osteoarthritis of both knees  M17 0    2  Chronic pain of left knee  M25 562     G89 29                   Subjective: Pt reports that she is sore today, notes that she has not been wearing compression stocking lately and that leg is slightly more swollen  Feels she is improving overall  Pt notes that she took narcotic (prescribed) for pain this morning as she could not get comfortable  This was the first one she has taken this week  Objective: palpable pocket of edema inferior and medial to area of incision, good reduction w/ manual effleurage       Assessment: Tolerated treatment well  Patient demonstrated fatigue post treatment and would benefit from continued PT  Pt reports fatigue but no increase in pain by end of session  She does well w/ continued progressions to standing program combined w/ progressive ROM work  She was asked to start back w/ compression stocking as this seemed to be controlled edema prior to this weekend  Pt presentation not concerning for anything malicious at this point, will continue to monitor  Plan: Continue per plan of care   stairs, increase standing work as able      Precautions: B diabetic neuropathy, DM II    Date (Visit #)  (6)   (3)  (4) 9/3 (5)   Manual              DTM and TPR  DTM and scar massage to superior incision x 10 min total                                                        PROM 0-110    PROM 2-85  PROM 2-95  PROM 0-109      Exercise Diary              Ther Ex        Active w/u  NU step 10 min pre lvl 2-3    NU step mid session LE only lvl 1 x 8min NU step pre 9 min lvl 1  NU step pre lvl 2-3 x 9 min   PROM  PROM x 6-7 min for flex/ext on L   5 min  x10 min total throughout for flex/ext  x10 min total throughout manual and self   x8 min for flex/ext    HS/glute/piri stretch 3x30 sec on L LE    3x30 sec total on R LE   3x30 sec on R LE  3x30 sec on R    QS, SLR, hip abd  QS x 20  QS   20  QS 2x20 total  QS x 20 total QS x 10     SAQ x20  SAQ 2x20 SLR AA x10-15 total AA 2x5    Ad sq x 20  Ad sq 2x20l  x20   Neuro Re Ed        bridging      w/ ad sq 2x10 Reg x 20 2x10 w/ ad sq   TrA progressions                  WS practice x 2-3 mins  review   reiew    Hip Abd Progressions             Balance            Heel raises w/ focus on ecc x30 total At bar x 20 At bar x 20      AA SLR x 5 tota above above    6" ant step ups x15 total    6" stair taps x 10, 4" step ups x10-15 w/ therapist assist    Squats at bar x20    Squats at bar x 10    Marching at bar x 20    March at bar 2x10           Ther Act             stairs             gait             Sit<>stand    5x x5 throughout  x10 throughout       Ankle pumps    30  squats in // bars x10-15 total  in // bars x 20 total      Heel slides    20  reg gait x 4-5 laps in // bars, exag gait x 4-5 laps Reg x 4, marching x 4 laps, exag side step x 2-3 laps   reg x 4 laps    Knee ext    10  standing end range hip and knee flex x 10-15 total  review                         Reviewed current HEP  review of all x 2-3 mins   review   review                     Modalities             heat             Ice   post to L knee in long sit x 8 min                           ** between 8/25-9/3 HS stretching was mistakenly listed on R side only, should have been written as done on L  Correct on 9/8 going forward

## 2020-09-10 ENCOUNTER — OFFICE VISIT (OUTPATIENT)
Dept: PHYSICAL THERAPY | Facility: CLINIC | Age: 80
End: 2020-09-10
Payer: MEDICARE

## 2020-09-10 DIAGNOSIS — M17.0 PRIMARY OSTEOARTHRITIS OF BOTH KNEES: Primary | ICD-10-CM

## 2020-09-10 DIAGNOSIS — M25.562 CHRONIC PAIN OF LEFT KNEE: ICD-10-CM

## 2020-09-10 DIAGNOSIS — G89.29 CHRONIC PAIN OF LEFT KNEE: ICD-10-CM

## 2020-09-10 DIAGNOSIS — Z96.652 STATUS POST TOTAL KNEE REPLACEMENT USING CEMENT, LEFT: ICD-10-CM

## 2020-09-10 PROCEDURE — 97140 MANUAL THERAPY 1/> REGIONS: CPT | Performed by: PHYSICAL THERAPIST

## 2020-09-10 PROCEDURE — 97110 THERAPEUTIC EXERCISES: CPT | Performed by: PHYSICAL THERAPIST

## 2020-09-10 RX ORDER — OXYCODONE HYDROCHLORIDE 5 MG/1
TABLET ORAL
Qty: 30 TABLET | Refills: 0 | Status: SHIPPED | OUTPATIENT
Start: 2020-09-10 | End: 2020-09-21 | Stop reason: SDUPTHER

## 2020-09-10 NOTE — PROGRESS NOTES
Daily Note     Today's date: 9/10/2020  Patient name: Oswaldo Zapata  : 1940  MRN: [id number]  Referring provider: Roseanne Gaming  Dx:   Encounter Diagnosis     ICD-10-CM    1  Primary osteoarthritis of both knees  M17 0    2  Chronic pain of left knee  M25 562     G89 29        Subjective: Pt reporting she is feeling pain 4-5/10 L knee  Feels she is improving overall over than the pain she feels at night  Pt notes that she took narcotic (prescribed) for pain this morning prior to PT  Objective: Adjusted RW to appropriate height for pt as pt was demonstrating increased lumbar flexion with current RW height  Assessment: Tolerated treatment well  Patient demonstrated fatigue post treatment and would benefit from continued PT  Pt reports fatigue but no increase in pain by end of session  She does well w/ continued progressions to standing program combined w/ progressive ROM work  Educated pt re: use of SC and advised pt to speak with primary PT re: progression to [place] once appropriate    Plan: Continue per plan of care  stairs, increase standing work as able   Re-eval with primary Pt next visit     Precautions: B diabetic neuropathy, DM II    Date (Visit #)  (6) 9/10 (7) Re-eval  (4) 9/3 (5)   Manual              DTM and TPR  DTM and scar massage to superior incision x 10 min total  STM to L knee region to assist with decrease in swelling and pain   X 10 min total with PROM L knee as tolerated                                                      PROM 0-110  A/AAROM 0-110    PROM 2-95  PROM 0-109      Exercise Diary              Ther Ex        Active w/u  NU step 10 min pre lvl 2-3  nu step x10 min level 1  17 miles  NU step pre 9 min lvl 1  NU step pre lvl 2-3 x 9 min   PROM  PROM x 6-7 min for flex/ext on L   5 min   x10 min total throughout manual and self   x8 min for flex/ext    HS/glute/piri stretch 3x30 sec on L LE  x30 x 5 LLE   3x30 sec on R LE  3x30 sec on R    QS, SLR, hip abd  QS x 20  QS   20   QS x 20 total QS x 10     SAQ x20 SAQ x20  SLR AA x10-15 total AA 2x5    Ad sq x 20    x20   Neuro Re Ed        bridging    with add 2x10 hold 5  Reg x 20 2x10 w/ ad sq   TrA progressions                   review   reiew    Hip Abd Progressions            Balance   Standing heel raises x 20          At bar x 20 At bar x 20       above above    6" ant step ups x15 total 6" ant step ups x 10   6" stair taps x 10, 4" step ups x10-15 w/ therapist assist    Squats at bar x20 Squats at table x10   Squats at bar x 10    Marching at bar x 20 Marching at bar x 10   March at bar 2x10           Ther Act             stairs             gait             Sit<>stand    5x x5 throughout  x10 throughout       Ankle pumps    30  squats in // bars x10-15 total  in // bars x 20 total      Heel slides    20 AAROM with green SOS  reg gait x 4-5 laps in // bars, exag gait x 4-5 laps Reg x 4, marching x 4 laps, exag side step x 2-3 laps   reg x 4 laps    Knee ext    10 hold 5 seated  standing end range hip and knee flex x 10-15 total  review                         Reviewed current HEP  review of all x 2-3 mins   review   review                     Modalities             heat             Ice   post to L knee in long sit x 8 min  ant/post L knee x 10 min                         ** between 8/25-9/3 HS stretching was mistakenly listed on R side only, should have been written as done on L  Correct on 9/8 going forward

## 2020-09-15 ENCOUNTER — EVALUATION (OUTPATIENT)
Dept: PHYSICAL THERAPY | Facility: CLINIC | Age: 80
End: 2020-09-15
Payer: MEDICARE

## 2020-09-15 DIAGNOSIS — M17.0 PRIMARY OSTEOARTHRITIS OF BOTH KNEES: Primary | ICD-10-CM

## 2020-09-15 DIAGNOSIS — M25.562 CHRONIC PAIN OF LEFT KNEE: ICD-10-CM

## 2020-09-15 DIAGNOSIS — G89.29 CHRONIC PAIN OF LEFT KNEE: ICD-10-CM

## 2020-09-15 PROCEDURE — 97112 NEUROMUSCULAR REEDUCATION: CPT

## 2020-09-15 PROCEDURE — 97110 THERAPEUTIC EXERCISES: CPT

## 2020-09-15 NOTE — PROGRESS NOTES
Progress Note     Today's date: 9/15/2020  Patient name: Juliana Gamboa  : 1940  MRN: [id number]  Referring provider: Alysia Rucker  Dx:   Encounter Diagnosis     ICD-10-CM    1  Primary osteoarthritis of both knees  M17 0    2  Chronic pain of left knee  M25 562     G89 29        Start Time: 1015  Stop Time: 1105  Total time in clinic (min): 50 minutes    Subjective: Pt reports good improvement over the last month  Arrives today w/ straight cane in place of walker, good WB through L LE, and well controlled pain  She believes that she has improved in her walking ability, her ability to get in and out of chairs, and her ability to uses stairs  Going down stairs and get into/out of car remain her biggest challenges  Functional update below:       Goals  Short term goals (4 weeks from post op visit): ALL PROGRESSED   1) Pt will improve post-op L knee A ROM to 0-115 pain free  2) Pt will improve B LE and core strength deficits by 1/3 grade MMT  3) Pt will reports pain at worse <5/10  4) Pt will initiate and progress HEP w/ special emphasis on functional L knee ROM and B LE/core strength  Long term goals (8 weeks) ALL PROGRESSED   1) Pt will improve FOTO to at least 20 points higher than initial post op score  2) Pt will stand and ambulate for durations of 20 min w/ normalized gait mechanics and no pain  3) Pt will perform full week of functional activities to include stair negotiation, home activities, and recreation w/ L knee pain <4/10  4) Pt will be independent and compliant w/ HEP in order to maximize functional benefit of skilled PT following d/c  New Goals 9/15/20:  Short term goals (4 weeks from post op visit):   1) Pt will improve post-op L knee A ROM to 0-115 pain free  2) Pt will improve B LE and core strength deficits by 1/3 grade MMT  3) Pt will reports pain at worse <5/10    4) Pt will initiate and progress HEP w/ special emphasis on functional L knee ROM and B LE/core strength  Long term goals (8 weeks)  1) Pt will improve FOTO to at least 20 points higher than initial post op score  2) Pt will stand and ambulate for durations of 20 min w/ normalized gait mechanics and no pain  3) Pt will perform full week of functional activities to include stair negotiation, home activities, and recreation w/ L knee pain <4/10  4) Pt will be independent and compliant w/ HEP in order to maximize functional benefit of skilled PT following d/c  Pain  Current pain ratin  At best pain ratin  At worst pain ratin  Location: L knee medial>lateral  Quality: dull ache, sharp and throbbing  Relieving factors: rest, medications and change in position  Aggravating factors: standing, walking, stair climbing, lifting and running  Progression: improved since surgery     Social Support  Steps to enter house: yes  Stairs in house: yes   Lives in: multiple-level home  Lives with: spouse    Employment status: not working (retired)  Patient Goals  Patient goals for therapy: increased strength, decreased pain, increased motion and return to sport/leisure activities  Patient goal: be ablw to walking without pain or limp! Get back to going out with friends without pain! Objective     Palpation   Left   Hypertonic in the distal biceps femoris, distal semimembranosus, distal semitendinosus and vastus lateralis  -9/15: min TTP about area of incision, not specifically painful to touch today  -incision is well healing w/o signs of infection today       Neurological Testing     Sensation     Knee   Left Knee   Intact: light touch    Right Knee   Intact: light touch     Active Range of Motion   Left Knee   Flexion: 71 degrees   Extension: -9 degrees     15: L knee 2-105    Right Knee   Flexion: 125 degrees   Extension: 0 degrees     Passive Range of Motion   Left Knee   Flexion: 75 degrees   Extension: -6 degrees    15: L knee 0-109     Right Knee   Flexion: 130 degrees   Extension: 0 degrees     Strength/Myotome Testing     Left Knee   Flexion: 3-  Extension: 3-    Right Knee   Flexion: 4-  Extension: 4-    Additional Strength Details  Hips grossly 4-/5 throughout    Pt shows poor to fair quad contraction on L, good on R    SLR on L x 5 before loss of TKE    9/15: L knee grossly 3+/5, R side 4/5, hips grossly 4/5    SLR on L x10 w/ good pacing and form, quick fatigue     Swelling     Left Knee Girth Measurement (cm)   Joint line: 43 8 cm    Right Knee Girth Measurement (cm)   Joint line: 43 1 cm    9/15: was not measured     Ambulation   Weight-Bearing Status   Weight-Bearing Status (Left): weight-bearing as tolerated   Assistive device used: four-wheeled walker    Ambulation: Level Surfaces     Additional Level Surfaces Ambulation Details  Poor WB on L LE, decreased lumbar rotation, min antalgic w/ increased time to complete, fatigues quickly     9/15: ambulates w/ SC, alternates side but R sided trunk lean w/ min decreased WB through L LE, improved ability to heel strike and push off on L, less fatigue, improved upright positioning     Functional Assessment        Comments  Sit<>stand: requires UE support on LE and R sided WS throughout  BW squat: unable     Stairs: TBA    SLS:  Unable on L side today     9/15: sit<>stand requires min UE support on LE from level chair height w/ R sided WS and min pain - BW squat through 25%-50% w/ cueing required for proper glute activation - stairs: 6" step ups leading w/ L w/ mod UE support and quick fatigue - SLS not assessed     Assessment: Tolerated treatment well  Patient demonstrated fatigue post treatment and would benefit from continued PT  Pt reports fatigue but no increase in pain by end of session  She has made significant progress in terms of pain free L knee ROM, B LE strength, quality of functional motion, self reports of pain, and FOTO score   She is appropriate for continued sessions w/ heavy emphasis on strength and functional mobility progressions  Pt is encouraged by progress, HEP was reviewed and well understood  Continue to progress as sx allow  Plan: Continue per plan of care  higher level stair progressions, single leg work, squat work     Precautions: B diabetic neuropathy, DM II    Date (Visit #) 9/8 (6) 9/10 (7) 9/15 (8) PN   9/3 (5)   Manual              DTM and TPR  DTM and scar massage to superior incision x 10 min total  STM to L knee region to assist with decrease in swelling and pain   X 10 min total with PROM L knee as tolerated  no                                                    PROM 0-110  A/AAROM 0-110  PROM 0-109  PROM 2-95  PROM 0-109      Exercise Diary              Ther Ex        Active w/u  NU step 10 min pre lvl 2-3  nu step x10 min level 1  17 miles Recumbent bike half revs pre x 10 min NU step pre 9 min lvl 1  NU step pre lvl 2-3 x 9 min   PROM  PROM x 6-7 min for flex/ext on L   5 min Manual x 5-6 mins w/ focus on end range flexion   x10 min total throughout manual and self   x8 min for flex/ext    HS/glute/piri stretch 3x30 sec on L LE  x30 x 5 LLE 3x30 sec on L side only   3x30 sec on R LE  3x30 sec on R    QS, SLR, hip abd  QS x 20  QS   20   QS x 20 total QS x 10     SAQ x20 SAQ x20  SLR AA x10-15 total AA 2x5    Ad sq x 20  Ad sq x 20  x20   Neuro Re Ed        bridging    with add 2x10 hold 5 W/ ad sq x 20 Reg x 20 2x10 w/ ad sq   TrA progressions     Sit<>stand from elevated EOB x15 total               review   reiew    Hip Abd Progressions     Side stepping at bar x 5 laps        Balance   Standing heel raises x 20 x20         At bar x 20 At bar x 20       above above    6" ant step ups x15 total 6" ant step ups x 10 6" ant step ups leading w/ L 2x10  6" stair taps x 10, 4" step ups x10-15 w/ therapist assist    Squats at bar x20 Squats at table x10 Squats at bar 2x10   Squats at bar x 10    Marching at bar x 20 Marching at bar x 10   March at bar 2x10           Ther Act             stairs gait             Sit<>stand    5x   x10 throughout       Ankle pumps    30   in // bars x 20 total      Heel slides    20 AAROM with green SOS  Reg x 4, marching x 4 laps, exag side step x 2-3 laps   reg x 4 laps    Knee ext    10 hold 5 seated   review                         Reviewed current HEP  review of all, FOTO x 2-3 mins   review   review                     Modalities             heat             Ice   post to L knee in long sit x 8 min  ant/post L knee x 10 min  post to L knee supine x 8 min                       ** between 8/25-9/3 HS stretching was mistakenly listed on R side only, should have been written as done on L  Correct on 9/8 going forward

## 2020-09-17 ENCOUNTER — OFFICE VISIT (OUTPATIENT)
Dept: PHYSICAL THERAPY | Facility: CLINIC | Age: 80
End: 2020-09-17
Payer: MEDICARE

## 2020-09-17 DIAGNOSIS — M17.0 PRIMARY OSTEOARTHRITIS OF BOTH KNEES: Primary | ICD-10-CM

## 2020-09-17 DIAGNOSIS — M25.562 CHRONIC PAIN OF LEFT KNEE: ICD-10-CM

## 2020-09-17 DIAGNOSIS — G89.29 CHRONIC PAIN OF LEFT KNEE: ICD-10-CM

## 2020-09-17 PROCEDURE — 97112 NEUROMUSCULAR REEDUCATION: CPT

## 2020-09-17 PROCEDURE — 97110 THERAPEUTIC EXERCISES: CPT

## 2020-09-17 NOTE — PROGRESS NOTES
Daily Note     Today's date: 2020  Patient name: Des Redding  : 1940  MRN: [id number]  Referring provider: April Law  Dx:   Encounter Diagnosis     ICD-10-CM    1  Primary osteoarthritis of both knees  M17 0    2  Chronic pain of left knee  M25 562     G89 29                   Subjective: Pt reports soreness at similar level to previous sessions  Feels that she is improving overall, stairs are very difficult  Objective: Requires mod UE support and use of momentum to perform 4+ step up leading w/ L leg  Assessment: Tolerated treatment well  Patient demonstrated fatigue post treatment and would benefit from continued PT  Fatigue and soreness but no increase in pain by end  She does well w/ higher level standing exercises, was given updated HEP to reflect this  She was encouraged to only perform smaller steps w/ UE support as exercise, verbalizes understanding  Progress to higher level exercises as sx allow  Plan: Continue per plan of care  higher level stairs and squats, hip abd     Precautions: B diabetic neuropathy, DM II    Date (Visit #)  (6) 9/10 (7) 9/15 (8) PN   (9)    Manual              DTM and TPR  DTM and scar massage to superior incision x 10 min total  STM to L knee region to assist with decrease in swelling and pain   X 10 min total with PROM L knee as tolerated  no  no                                                  PROM 0-110  A/AAROM 0-110  PROM 0-109  PROM 0-110  PROM 0-109      Exercise Diary              Ther Ex        Active w/u  NU step 10 min pre lvl 2-3  nu step x10 min level 1  17 miles Recumbent bike half revs pre x 10 min Recumbent half revs pre x 1  NU step pre lvl 2-3 x 9 min   PROM  PROM x 6-7 min for flex/ext on L   5 min Manual x 5-6 mins w/ focus on end range flexion   x5-6 min total throughout manual and self   x8 min for flex/ext    HS/glute/piri stretch 3x30 sec on L LE  x30 x 5 LLE 3x30 sec on L side only   3x30 sec on R LE 3x30 sec on R    QS, SLR, hip abd  QS x 20  QS   20   QS x 20 total QS x 10     SAQ x20 SAQ x20  SLR AA 2x8 AA 2x5    Ad sq x 20  Ad sq x 20  x20   Neuro Re Ed        bridging    with add 2x10 hold 5 W/ ad sq x 20 2x10 w/ ad sq 2x10 w/ ad sq   TrA progressions     Sit<>stand from elevated EOB x15 total  2x10 from airex on chair             reiew    Hip Abd Progressions     Side stepping at bar x 5 laps  Review      Balance   Standing heel raises x 20 x20 3x10         At bar x 20        above    6" ant step ups x15 total 6" ant step ups x 10 6" ant step ups leading w/ L 2x10 4-6" ant step ups leading w/ L x 20-25  6" stair taps x 10, 4" step ups x10-15 w/ therapist assist    Squats at bar x20 Squats at table x10 Squats at bar 2x10  no Squats at bar x 10    Marching at bar x 20 Marching at bar x 10  X20, hip abd and ext review  March at bar 2x10           Ther Act             stairs             gait             Sit<>stand    5x        Ankle pumps    30        Heel slides    20 AAROM with green SOS    reg x 4 laps    Knee ext    10 hold 5 seated                         Reviewed current HEP  review of all, FOTO x 2-3 mins    review                     Modalities             heat             Ice   post to L knee in long sit x 8 min  ant/post L knee x 10 min  post to L knee supine x 8 min  post to L knee in supine                     ** between 8/25-9/3 HS stretching was mistakenly listed on R side only, should have been written as done on L  Correct on 9/8 going forward

## 2020-09-21 DIAGNOSIS — Z96.652 STATUS POST TOTAL KNEE REPLACEMENT USING CEMENT, LEFT: ICD-10-CM

## 2020-09-21 RX ORDER — OXYCODONE HYDROCHLORIDE 5 MG/1
TABLET ORAL
Qty: 30 TABLET | Refills: 0 | Status: CANCELLED | OUTPATIENT
Start: 2020-09-21

## 2020-09-21 RX ORDER — OXYCODONE HYDROCHLORIDE 5 MG/1
TABLET ORAL
Qty: 30 TABLET | Refills: 0 | Status: SHIPPED | OUTPATIENT
Start: 2020-09-21 | End: 2020-10-28

## 2020-09-22 ENCOUNTER — ANTICOAG VISIT (OUTPATIENT)
Dept: CARDIOLOGY CLINIC | Facility: CLINIC | Age: 80
End: 2020-09-22

## 2020-09-22 ENCOUNTER — APPOINTMENT (OUTPATIENT)
Dept: LAB | Facility: CLINIC | Age: 80
End: 2020-09-22
Payer: MEDICARE

## 2020-09-22 ENCOUNTER — OFFICE VISIT (OUTPATIENT)
Dept: PHYSICAL THERAPY | Facility: CLINIC | Age: 80
End: 2020-09-22
Payer: MEDICARE

## 2020-09-22 DIAGNOSIS — I48.19 PERSISTENT ATRIAL FIBRILLATION (HCC): ICD-10-CM

## 2020-09-22 DIAGNOSIS — G89.29 CHRONIC PAIN OF LEFT KNEE: ICD-10-CM

## 2020-09-22 DIAGNOSIS — M25.562 CHRONIC PAIN OF LEFT KNEE: ICD-10-CM

## 2020-09-22 DIAGNOSIS — M17.0 PRIMARY OSTEOARTHRITIS OF BOTH KNEES: Primary | ICD-10-CM

## 2020-09-22 PROCEDURE — 97110 THERAPEUTIC EXERCISES: CPT

## 2020-09-22 PROCEDURE — 97112 NEUROMUSCULAR REEDUCATION: CPT

## 2020-09-22 NOTE — PROGRESS NOTES
Daily Note     Today's date: 2020  Patient name: Michael Limon  : 1940  MRN: [id number]  Referring provider: Migue Blanc:   Encounter Diagnosis     ICD-10-CM    1  Primary osteoarthritis of both knees  M17 0    2  Chronic pain of left knee  M25 562     G89 29                   Subjective: Pt reports that she feels pretty good today, no significant new complaints  Knee is still sore but is improving  Objective: Requires cueing and at least mod UE support w/ performing step up w/ L LE  Improves form w/ cueing for glute activation but fatigues quickly  Assessment: Tolerated treatment well  Patient demonstrated fatigue post treatment and would benefit from continued PT    Fatigue at end of session but good tolerance to progressions  She does well w/ stair work, will benefit from continued increases in challenge as sx allow  Will focus more and more on higher level strengthening in standing positions as this is major limiting factor in overall quality of life at this point  Good overall progress to date  Plan: Continue per plan of care  higher level stairs, squats, hip abd, single leg work if able      Precautions: B diabetic neuropathy, DM II    Date (Visit #)  (6) 9/10 (7) 9/15 (8) PN   (9)  (10)   Manual              DTM and TPR  DTM and scar massage to superior incision x 10 min total  STM to L knee region to assist with decrease in swelling and pain   X 10 min total with PROM L knee as tolerated  no  no  x8 min to L quad w/ scar massage to proximal 2/3 of incision                                                 PROM 0-110  A/AAROM 0-110  PROM 0-109  PROM 0-110  PROM 0-111      Exercise Diary              Ther Ex        Active w/u  NU step 10 min pre lvl 2-3  nu step x10 min level 1  17 miles Recumbent bike half revs pre x 10 min Recumbent half revs pre x 1  NU step pre lvl 3 x 9 5 min post    PROM  PROM x 6-7 min for flex/ext on L   5 min Manual x 5-6 mins w/ focus on end range flexion   x5-6 min total throughout manual and self   x8 min for flex/ext    HS/glute/piri stretch 3x30 sec on L LE  x30 x 5 LLE 3x30 sec on L side only   3x30 sec on R LE 3x30 sec on L LE    QS, SLR, hip abd  QS x 20  QS   20   QS x 20 total QS x 10,     SAQ x20 SAQ x20  SLR AA 2x8 SLR active x 10, AA x 10    Ad sq x 20  Ad sq x 20     Neuro Re Ed        bridging    with add 2x10 hold 5 W/ ad sq x 20 2x10 w/ ad sq 2x10-12 w/ ad sq   TrA progressions     Sit<>stand from elevated EOB x15 total  2x10 from airex on chair             reiew    Hip Abd Progressions     Side stepping at bar x 5 laps  Review   exag gait at // bars x 4 laps    Balance   Standing heel raises x 20 x20 3x10                     6" ant step ups x15 total 6" ant step ups x 10 6" ant step ups leading w/ L 2x10 4-6" ant step ups leading w/ L x 20-25  6" stair taps x 10, 4" step ups x 20 total anterior and lateral each     Squats at bar x20 Squats at table x10 Squats at bar 2x10  no Squats at bar through 33% x 15 total    Marching at bar x 20 Marching at bar x 10  X20, hip abd and ext review             Ther Act             stairs             gait             Sit<>stand    5x        Ankle pumps    30        Heel slides    20 AAROM with green SOS       Knee ext    10 hold 5 seated                        Reviewed current HEP  review of all, FOTO x 2-3 mins    review                     Modalities             heat             Ice   post to L knee in long sit x 8 min  ant/post L knee x 10 min  post to L knee supine x 8 min  post to L knee in supine x 8-10 min  post to L knee in semi-reclined position x 8 min                   ** between 8/25-9/3 HS stretching was mistakenly listed on R side only, should have been written as done on L  Correct on 9/8 going forward

## 2020-09-24 ENCOUNTER — OFFICE VISIT (OUTPATIENT)
Dept: PHYSICAL THERAPY | Facility: CLINIC | Age: 80
End: 2020-09-24
Payer: MEDICARE

## 2020-09-24 DIAGNOSIS — M17.0 PRIMARY OSTEOARTHRITIS OF BOTH KNEES: Primary | ICD-10-CM

## 2020-09-24 DIAGNOSIS — M25.562 CHRONIC PAIN OF LEFT KNEE: ICD-10-CM

## 2020-09-24 DIAGNOSIS — G89.29 CHRONIC PAIN OF LEFT KNEE: ICD-10-CM

## 2020-09-24 PROCEDURE — 97140 MANUAL THERAPY 1/> REGIONS: CPT

## 2020-09-24 PROCEDURE — 97112 NEUROMUSCULAR REEDUCATION: CPT

## 2020-09-24 PROCEDURE — 97110 THERAPEUTIC EXERCISES: CPT

## 2020-09-24 NOTE — PROGRESS NOTES
Daily Note     Today's date: 2020  Patient name: Shay Webb  : 1940  MRN: [id number]  Referring provider: Harry Greenberg  Dx:   Encounter Diagnosis     ICD-10-CM    1  Primary osteoarthritis of both knees  M17 0    2  Chronic pain of left knee  M25 562     G89 29        Start Time: 1015  Stop Time: 1100  Total time in clinic (min): 45 minutes    Subjective: Pt reports 5-6/10 pain in L knee prior to session  She reports she experienced "sharp" pain this morning which she said she has not experienced  Objective: Requires cueing and at least mod UE support w/ performing step up w/ L LE  Improves form w/ cueing for glute activation but fatigues quickly  Assessment: Tolerated treatment well  Patient demonstrated fatigue post treatment and would benefit from continued PT    Pt demo inc fatigue post session, able to perform active SLR today  Fatigues quickly with exag gate  Plan: Continue per plan of care  higher level stairs, hip abd, single leg work if able      Precautions: B diabetic neuropathy, DM II    Date (Visit #) 9/10 (7) 9/15 (8) PN   (9)  (10)  (11)   Manual             DTM and TPR  STM to L knee region to assist with decrease in swelling and pain   X 10 min total with PROM L knee as tolerated  no  no  x8 min to L quad w/ scar massage to proximal 2/3 of incision                                               A/AAROM 0-110  PROM 0-109  PROM 0-110  PROM 0-111       Exercise Diary             Ther Ex        Active w/u  nu step x10 min level 1  17 miles Recumbent bike half revs pre x 10 min Recumbent half revs pre x 1  NU step pre lvl 3 x 9 5 min post  NU step pre lvl 3 x 9 5 min post    PROM  5 min Manual x 5-6 mins w/ focus on end range flexion   x5-6 min total throughout manual and self   x8 min for flex/ext   x8 min for flex/ext    HS/glute/piri stretch  x30 x 5 LLE 3x30 sec on L side only   3x30 sec on R LE 3x30 sec on L LE  3x30 sec on L LE    QS, SLR, hip abd  QS   20   QS x 20 total QS x 10,  QS x 10,     SAQ x20  SLR AA 2x8 SLR active x 10, AA x 10 SLR active 2x10     Ad sq x 20      Neuro Re Ed        bridging  with add 2x10 hold 5 W/ ad sq x 20 2x10 w/ ad sq 2x10-12 w/ ad sq 2x10-12 w/ ad sq   TrA progressions   Sit<>stand from elevated EOB x15 total  2x10 from airex on chair            reiew     Hip Abd Progressions   Side stepping at bar x 5 laps  Review   exag gait at // bars x 4 laps  exag gait at // bars x 4 laps   Balance  Standing heel raises x 20 x20 3x10                      6" ant step ups x 10 6" ant step ups leading w/ L 2x10 4-6" ant step ups leading w/ L x 20-25  6" stair taps x 10, 4" step ups x 20 total anterior and lateral each      Squats at table x10 Squats at bar 2x10  no Squats at bar through 33% x 15 total Squats at bar 20x    Marching at bar x 10  X20, hip abd and ext review              Ther Act            stairs            gait            Sit<>stand  5x         Ankle pumps  30         Heel slides  20 AAROM with green SOS        Knee ext  10 hold 5 seated                      Reviewed current HEP  review of all, FOTO x 2-3 mins    review                     Modalities             heat             Ice   post to L knee in long sit x 8 min  ant/post L knee x 10 min  post to L knee supine x 8 min  post to L knee in supine x 8-10 min  post to L knee in semi-reclined position x 8 min                   ** between 8/25-9/3 HS stretching was mistakenly listed on R side only, should have been written as done on L  Correct on 9/8 going forward

## 2020-09-25 ENCOUNTER — TELEPHONE (OUTPATIENT)
Dept: INTERNAL MEDICINE CLINIC | Facility: CLINIC | Age: 80
End: 2020-09-25

## 2020-09-25 NOTE — TELEPHONE ENCOUNTER
Pt wanted to let PCP know she had knee replacement on Left knee 5 weeks ago and is doing good  Does she need to come in at all ?

## 2020-09-29 ENCOUNTER — OFFICE VISIT (OUTPATIENT)
Dept: PHYSICAL THERAPY | Facility: CLINIC | Age: 80
End: 2020-09-29
Payer: MEDICARE

## 2020-09-29 DIAGNOSIS — M17.0 PRIMARY OSTEOARTHRITIS OF BOTH KNEES: Primary | ICD-10-CM

## 2020-09-29 DIAGNOSIS — G89.29 CHRONIC PAIN OF LEFT KNEE: ICD-10-CM

## 2020-09-29 DIAGNOSIS — M25.562 CHRONIC PAIN OF LEFT KNEE: ICD-10-CM

## 2020-09-29 PROCEDURE — 97110 THERAPEUTIC EXERCISES: CPT

## 2020-09-29 PROCEDURE — 97112 NEUROMUSCULAR REEDUCATION: CPT

## 2020-09-29 NOTE — PROGRESS NOTES
Daily Note     Today's date: 2020  Patient name: Shay Webb  : 1940  MRN: [id number]  Referring provider: Harry Greenberg  Dx:   Encounter Diagnosis     ICD-10-CM    1  Primary osteoarthritis of both knees  M17 0    2  Chronic pain of left knee  M25 562     G89 29                   Subjective: Pt reports no new complaints  MD follow up tomorrow  Notes morning stiffness but feels that leg loosens up throughout the day  Objective: L knee AROM 0-109, PROM 0-116  6" step up w/ mod UE support and cueing for proper glute drive, able to ambulate safely w/o AD in clinic, fatigues quickly w/ targeted hip and core stab      Assessment: Tolerated treatment well  Patient demonstrated fatigue post treatment and would benefit from continued PT  Pt notes fatigue but no increase in pain by end of session  She is doing very well 6 weeks s/p L TKA, ROM is nearing normal expected limits  Strength will be major point of emphasis moving forward as stair work, squats, and other functional strengthening produces significant fatigue today  Appropriate for higher level progressions barring setback after MD visit  Plan: Continue per plan of care   check results of MD visit, higher level stair, squat, gait training      Precautions: B diabetic neuropathy, DM II    Date (Visit #)  (12)   (9)  (10)  (11)   Manual             DTM and TPR   no  no  x8 min to L quad w/ scar massage to proximal 2/3 of incision                                              PROM 0-116  PROM 0-109  PROM 0-110  PROM 0-111       Exercise Diary             Ther Ex        Active w/u NU step 10 min lvl 3 LE only Recumbent bike half revs pre x 10 min Recumbent half revs pre x 1  NU step pre lvl 3 x 9 5 min post  NU step pre lvl 3 x 9 5 min post    PROM x6 min for flex  Manual x 5-6 mins w/ focus on end range flexion   x5-6 min total throughout manual and self   x8 min for flex/ext   x8 min for flex/ext    HS/glute/piri stretch 3x30 sec on L HS  3x30 sec on L side only   3x30 sec on R LE 3x30 sec on L LE  3x30 sec on L LE    QS, SLR, hip abd  QS   20   QS x 20 total QS x 10,  QS x 10,     SLR x 15 total  SLR AA 2x8 SLR active x 10, AA x 10 SLR active 2x10     Ad sq x 20      Neuro Re Ed        bridging x15 w/ ad sq W/ ad sq x 20 2x10 w/ ad sq 2x10-12 w/ ad sq 2x10-12 w/ ad sq   TrA progressions  Sit<>stand from elevated EOB x15 total  2x10 from airex on chair            reiew     Hip Abd Progressions  side stepping x 5 laps at // bars, w/ GTB x 5 laps  Side stepping at bar x 5 laps  Review   exag gait at // bars x 4 laps  exag gait at // bars x 4 laps   Balance  Standing heel raises x 20 x20 3x10      Standing march 2x15 B                 6" ant step ups 2x10  6" ant step ups leading w/ L 2x10 4-6" ant step ups leading w/ L x 20-25  6" stair taps x 10, 4" step ups x 20 total anterior and lateral each      Squats at bar x 20  Squats at bar 2x10  no Squats at bar through 33% x 15 total Squats at bar 20x      X20, hip abd and ext review              Ther Act            stairs            gait            Sit<>stand          Ankle pumps          Heel slides         Knee ext                       review of all, FOTO x 2-3 mins    review                     Modalities             heat             Ice   post to L knee in long sit x 6 min  ant/post L knee x 10 min  post to L knee supine x 8 min  post to L knee in supine x 8-10 min  post to L knee in semi-reclined position x 8 min                   ** between 8/25-9/3 HS stretching was mistakenly listed on R side only, should have been written as done on L  Correct on 9/8 going forward

## 2020-09-30 ENCOUNTER — OFFICE VISIT (OUTPATIENT)
Dept: OBGYN CLINIC | Facility: CLINIC | Age: 80
End: 2020-09-30

## 2020-09-30 VITALS
DIASTOLIC BLOOD PRESSURE: 84 MMHG | WEIGHT: 223.4 LBS | HEART RATE: 90 BPM | SYSTOLIC BLOOD PRESSURE: 130 MMHG | BODY MASS INDEX: 37.22 KG/M2 | HEIGHT: 65 IN

## 2020-09-30 DIAGNOSIS — Z96.652 STATUS POST TOTAL KNEE REPLACEMENT USING CEMENT, LEFT: ICD-10-CM

## 2020-09-30 DIAGNOSIS — Z47.1 AFTERCARE FOLLOWING LEFT KNEE JOINT REPLACEMENT SURGERY: Primary | ICD-10-CM

## 2020-09-30 DIAGNOSIS — Z96.652 AFTERCARE FOLLOWING LEFT KNEE JOINT REPLACEMENT SURGERY: Primary | ICD-10-CM

## 2020-09-30 PROCEDURE — 99024 POSTOP FOLLOW-UP VISIT: CPT | Performed by: ORTHOPAEDIC SURGERY

## 2020-09-30 NOTE — PROGRESS NOTES
Assessment/Plan:  1  Aftercare following left knee joint replacement surgery     2  Status post total knee replacement using cement, left       Patient is here 6 weeks status post left total knee arthroplasty  Overall she states she is doing extremely well and is very pleased with the outcome of her surgery  She states that she feels excellent  She may continue physical therapy and wean from the use of her cane  She may continue analgesia p r n  as she does state that she has some mild soreness in the evening when she tries to rest   At this point she may continue activities as tolerated  We discussed prophylactic antibiotic use prior to any dental or GI procedure  I will see her back in 6 weeks time for continued postoperative follow-up  That will be her 3 month visit we will get new x-rays of her left knee at that time  Subjective:  6 week follow-up status post left total knee arthroplasty    Patient ID: Susan Edwards is a 70 y o  female  HPI  Patient is here 6 weeks status post left total knee arthroplasty  Overall she states that she feels excellent   She states that she has some stiffness in the morning and with mild soreness at night when she tries to rest   She states that she occasionally takes a Tylenol or oxycodone for relief at night when she is trying to rest   She is doing well with physical therapy  She states that she only uses a cane outdoors and does not require regularly  Overall she is very pleased with her progress postoperatively and pleased with the outcome of her surgery thus far  Review of Systems   Constitutional: Positive for activity change  HENT: Negative  Eyes: Negative  Respiratory: Negative  Cardiovascular: Negative  Gastrointestinal: Negative  Endocrine: Negative  Musculoskeletal: Positive for arthralgias (Decreased, mild soreness)  Skin: Negative  Neurological: Negative  Psychiatric/Behavioral: Negative            Past Medical History:   Diagnosis Date    Atrial fibrillation ([facility] )     Carrero's esophagus     last assessed: 1/23/2018    BRCA1 negative     BRCA2 negative     Breast cancer ([facility] [address] ) 2006    stage 1 (left), given adjuvant radiation with Arimidex x 5 years    Cancer [facility]) 2006    Left Breast, Lumpectomy    Cataract     last assessed: 3/11/2014    Dysplasia of toenail     last assessed: 8/29/2017    Esophageal reflux     GERD (gastroesophageal reflux disease)     Gross hematuria     last assessed: 2/19/2015    Hematuria     Hiatal hernia     History of radiation therapy     Hypertension     Irregular heart beat     AFIB    Mixed sensory-motor polyneuropathy     Neuropathy     Obesity     Pacemaker     Paroxysmal atrial fibrillation (HCC)     Peripheral neuropathy     Rectal bleeding     Restless leg syndrome     Shortness of breath     last assessed: 1/11/2016       Past Surgical History:   Procedure Laterality Date    BREAST BIOPSY Left 2006    BREAST LUMPECTOMY Left 2006    onset: 2006    BREAST SURGERY      CARDIAC PACEMAKER PLACEMENT      x 3 2006    CATARACT EXTRACTION      COLONOSCOPY      CYSTOSCOPY  04/04/2014    diagnostic    HYSTERECTOMY      ALISON BSO; due to fibroid uterus; age 38   Jewel Pert KNEE CARTILAGE SURGERY      excision lesion of meniscus or capsule knee    KNEE SURGERY      OOPHORECTOMY Bilateral     OTHER SURGICAL HISTORY      radiation therapy    HI COLONOSCOPY FLX DX W/COLLJ SPEC WHEN PFRMD N/A 2/8/2017    Procedure: COLONOSCOPY;  Surgeon: Yobany Dawkins MD;  Location: [facility]; Service: Gastroenterology    HI ESOPHAGOGASTRODUODENOSCOPY TRANSORAL DIAGNOSTIC N/A 9/20/2017    Procedure: ESOPHAGOGASTRODUODENOSCOPY (EGD); Surgeon: Js Sinha MD;  Location: [facility];   Service: Gastroenterology    HI TOTAL KNEE ARTHROPLASTY Left 8/17/2020    Procedure: ARTHROPLASTY KNEE TOTAL;  Surgeon: Chris Kumar DO;  Location: [address] [place] [address];  Service: Orthopedics    UPPER GASTROINTESTINAL ENDOSCOPY         Family History   Problem Relation Age of Onset    Hypertension Mother     Heart disease Father     Aneurysm Father     Coronary artery disease Father         in his 76s with aneurysm    Aortic aneurysm Father         abdominal    Scleroderma Sister     Breast cancer Sister 76    Hypertension Sister     Cancer Sister     No Known Problems Son     No Known Problems Son     Testicular cancer Son 39    Thyroid cancer Son 45    Colon cancer Maternal Aunt     Colon cancer Maternal Aunt     Breast cancer Other 48        kaylee's daughter    Alcohol abuse Neg Hx     Substance Abuse Neg Hx     Mental illness Neg Hx     Depression Neg Hx        Social History     Occupational History    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      Comment: retired   Tobacco Use    Smoking status: Former Smoker     Packs/day: 1 00     Years: 25 00     Pack years: 25 00     Types: Cigarettes     Last attempt to quit: 1980     Years since quittin 0    Smokeless tobacco: Never Used    Tobacco comment: Quit over 30 years ago; quit age 41   Substance and Sexual Activity    Alcohol use: Not Currently    Drug use: No    Sexual activity: Not on file         Current Outpatient Medications:     acetaminophen (TYLENOL) 325 mg tablet, Take 3 tablets (975 mg total) by mouth every 8 (eight) hours, Disp: 30 tablet, Rfl: 0    budesonide (PULMICORT) 90 MCG/ACT inhaler, Inhale 1-2 puffs 2 (two) times a day, Disp: 1 Inhaler, Rfl: 0    Calcium Acetate, Phos Binder, (CALCIUM ACETATE PO), Take by mouth daily  , Disp: , Rfl:     clobetasol (TEMOVATE) 0 05 % ointment, Apply once weekly to the affected area, Disp: 30 g, Rfl: 3    docusate sodium (COLACE) 100 mg capsule, Take 1 capsule (100 mg total) by mouth 2 (two) times a day, Disp: 60 capsule, Rfl: 0    famotidine (PEPCID) 40 MG tablet, Take 1 tablet (40 mg total) by mouth daily, Disp: 30 tablet, Rfl: 3    furosemide (LASIX) 40 mg tablet, Take 1 tablet (40 mg total) by mouth daily, Disp: 90 tablet, Rfl: 3    gabapentin (NEURONTIN) 800 mg tablet, Take 1 tablet (800 mg total) by mouth 4 (four) times a day, Disp: 360 tablet, Rfl: 1    loratadine (CLARITIN) 10 mg tablet, Take 10 mg by mouth daily, Disp: , Rfl:     metoprolol tartrate (LOPRESSOR) 50 mg tablet, Take 1 tablet (50 mg total) by mouth 2 (two) times a day, Disp: 180 tablet, Rfl: 0    multivitamin (THERAGRAN) TABS, Take 1 tablet by mouth daily, Disp: , Rfl:     oxyCODONE (ROXICODONE) 5 mg immediate release tablet, Take 1/2 to 1 tablets by mouth every 4 as needed for pain, Disp: 30 tablet, Rfl: 0    pramipexole (MIRAPEX) 0 5 mg tablet, One and half tablet at 5:00 p m  and 10:00 p m , Disp: 360 tablet, Rfl: 1    warfarin (COUMADIN) 7 5 mg tablet, Take 1 tablet (7 5 mg total) by mouth daily, Disp: 90 tablet, Rfl: 3    Allergies   Allergen Reactions    Cephalexin Rash    Duloxetine Hcl Other (See Comments)     Facial pins and needles sensation    Erythromycin Rash    Levofloxacin Other (See Comments)     Muscular aches    Penicillins Rash    Savella [Milnacipran] Rash    Sulfa Antibiotics Rash       Objective:  Vitals:    09/30/20 1040   BP: 130/84   Pulse: 90       Body mass index is 37 75 kg/m²  Left Knee Exam     Tenderness   The patient is experiencing no tenderness  Range of Motion   Extension: 0   Flexion: 120     Tests   Varus: negative Valgus: negative  Drawer:  Anterior - negative       Patellar apprehension: negative    Other   Erythema: absent  Scars: present  Sensation: normal  Pulse: present  Swelling: none  Effusion: no effusion present    Comments:  Anterior knee scar well healed  Patella tracks midline and flat with passive range of motion  No erythema effusion or warmth  No crepitance on passive range of motion  Neurovascular intact  Passive range of motion without pain          Observations   Left Knee   Negative for effusion         Physical Exam  Vitals signs and nursing note reviewed  Constitutional:       Appearance: She is well-developed  HENT:      Head: Atraumatic  Nose: Nose normal    Neck:      Musculoskeletal: Neck supple  Cardiovascular:      Rate and Rhythm: Normal rate  Pulmonary:      Effort: Pulmonary effort is normal    Musculoskeletal:      Left knee: She exhibits no effusion  Comments: See orthopedic exam   Skin:     General: Skin is warm and dry  Neurological:      Mental Status: She is alert and oriented to person, place, and time  Park MELENDEZ    Division of Adult Reconstruction  Department of Orthopaedic Surgery  [facility]

## 2020-10-01 ENCOUNTER — OFFICE VISIT (OUTPATIENT)
Dept: PHYSICAL THERAPY | Facility: CLINIC | Age: 80
End: 2020-10-01
Payer: MEDICARE

## 2020-10-01 DIAGNOSIS — M17.0 PRIMARY OSTEOARTHRITIS OF BOTH KNEES: Primary | ICD-10-CM

## 2020-10-01 DIAGNOSIS — G89.29 CHRONIC PAIN OF LEFT KNEE: ICD-10-CM

## 2020-10-01 DIAGNOSIS — M25.562 CHRONIC PAIN OF LEFT KNEE: ICD-10-CM

## 2020-10-01 PROCEDURE — 97112 NEUROMUSCULAR REEDUCATION: CPT

## 2020-10-01 PROCEDURE — 97110 THERAPEUTIC EXERCISES: CPT

## 2020-10-06 ENCOUNTER — OFFICE VISIT (OUTPATIENT)
Dept: PHYSICAL THERAPY | Facility: CLINIC | Age: 80
End: 2020-10-06
Payer: MEDICARE

## 2020-10-06 DIAGNOSIS — G89.29 CHRONIC PAIN OF LEFT KNEE: ICD-10-CM

## 2020-10-06 DIAGNOSIS — M25.562 CHRONIC PAIN OF LEFT KNEE: ICD-10-CM

## 2020-10-06 DIAGNOSIS — M17.0 PRIMARY OSTEOARTHRITIS OF BOTH KNEES: Primary | ICD-10-CM

## 2020-10-06 PROCEDURE — 97112 NEUROMUSCULAR REEDUCATION: CPT

## 2020-10-06 PROCEDURE — 97110 THERAPEUTIC EXERCISES: CPT

## 2020-10-08 ENCOUNTER — OFFICE VISIT (OUTPATIENT)
Dept: PHYSICAL THERAPY | Facility: CLINIC | Age: 80
End: 2020-10-08
Payer: MEDICARE

## 2020-10-08 DIAGNOSIS — M17.0 PRIMARY OSTEOARTHRITIS OF BOTH KNEES: Primary | ICD-10-CM

## 2020-10-08 DIAGNOSIS — G89.29 CHRONIC PAIN OF LEFT KNEE: ICD-10-CM

## 2020-10-08 DIAGNOSIS — M25.562 CHRONIC PAIN OF LEFT KNEE: ICD-10-CM

## 2020-10-08 PROCEDURE — 97112 NEUROMUSCULAR REEDUCATION: CPT

## 2020-10-08 PROCEDURE — 97110 THERAPEUTIC EXERCISES: CPT

## 2020-10-10 ENCOUNTER — CLINICAL SUPPORT (OUTPATIENT)
Dept: INTERNAL MEDICINE CLINIC | Facility: CLINIC | Age: 80
End: 2020-10-10
Payer: MEDICARE

## 2020-10-10 DIAGNOSIS — Z23 ENCOUNTER FOR IMMUNIZATION: ICD-10-CM

## 2020-10-10 PROCEDURE — 90662 IIV NO PRSV INCREASED AG IM: CPT | Performed by: INTERNAL MEDICINE

## 2020-10-10 PROCEDURE — G0008 ADMIN INFLUENZA VIRUS VAC: HCPCS | Performed by: INTERNAL MEDICINE

## 2020-10-13 ENCOUNTER — OFFICE VISIT (OUTPATIENT)
Dept: PHYSICAL THERAPY | Facility: CLINIC | Age: 80
End: 2020-10-13
Payer: MEDICARE

## 2020-10-13 DIAGNOSIS — M17.0 PRIMARY OSTEOARTHRITIS OF BOTH KNEES: Primary | ICD-10-CM

## 2020-10-13 DIAGNOSIS — M25.562 CHRONIC PAIN OF LEFT KNEE: ICD-10-CM

## 2020-10-13 DIAGNOSIS — G89.29 CHRONIC PAIN OF LEFT KNEE: ICD-10-CM

## 2020-10-13 PROCEDURE — 97110 THERAPEUTIC EXERCISES: CPT

## 2020-10-13 PROCEDURE — 97112 NEUROMUSCULAR REEDUCATION: CPT

## 2020-10-15 ENCOUNTER — IN-CLINIC DEVICE VISIT (OUTPATIENT)
Dept: CARDIOLOGY CLINIC | Facility: CLINIC | Age: 80
End: 2020-10-15
Payer: MEDICARE

## 2020-10-15 DIAGNOSIS — Z95.0 PACEMAKER: Primary | ICD-10-CM

## 2020-10-15 PROCEDURE — 93279 PRGRMG DEV EVAL PM/LDLS PM: CPT | Performed by: INTERNAL MEDICINE

## 2020-10-19 ENCOUNTER — APPOINTMENT (OUTPATIENT)
Dept: LAB | Facility: HOSPITAL | Age: 80
End: 2020-10-19
Payer: MEDICARE

## 2020-10-19 ENCOUNTER — TRANSCRIBE ORDERS (OUTPATIENT)
Dept: ADMINISTRATIVE | Facility: HOSPITAL | Age: 80
End: 2020-10-19

## 2020-10-19 ENCOUNTER — ANTICOAG VISIT (OUTPATIENT)
Dept: CARDIOLOGY CLINIC | Facility: CLINIC | Age: 80
End: 2020-10-19

## 2020-10-20 ENCOUNTER — APPOINTMENT (OUTPATIENT)
Dept: PHYSICAL THERAPY | Facility: CLINIC | Age: 80
End: 2020-10-20
Payer: MEDICARE

## 2020-10-22 ENCOUNTER — APPOINTMENT (OUTPATIENT)
Dept: PHYSICAL THERAPY | Facility: CLINIC | Age: 80
End: 2020-10-22
Payer: MEDICARE

## 2020-10-26 ENCOUNTER — OFFICE VISIT (OUTPATIENT)
Dept: PODIATRY | Facility: CLINIC | Age: 80
End: 2020-10-26
Payer: MEDICARE

## 2020-10-26 VITALS
SYSTOLIC BLOOD PRESSURE: 132 MMHG | RESPIRATION RATE: 17 BRPM | BODY MASS INDEX: 37.15 KG/M2 | WEIGHT: 223 LBS | HEIGHT: 65 IN | DIASTOLIC BLOOD PRESSURE: 82 MMHG

## 2020-10-26 DIAGNOSIS — M79.672 PAIN IN BOTH FEET: ICD-10-CM

## 2020-10-26 DIAGNOSIS — L84 CORNS: ICD-10-CM

## 2020-10-26 DIAGNOSIS — M79.671 PAIN IN BOTH FEET: ICD-10-CM

## 2020-10-26 DIAGNOSIS — I70.209 PERIPHERAL ARTERIOSCLEROSIS (HCC): ICD-10-CM

## 2020-10-26 DIAGNOSIS — E11.51 TYPE 2 DIABETES MELLITUS WITH DIABETIC PERIPHERAL ANGIOPATHY WITHOUT GANGRENE, WITHOUT LONG-TERM CURRENT USE OF INSULIN (HCC): Primary | ICD-10-CM

## 2020-10-26 PROCEDURE — 11056 PARNG/CUTG B9 HYPRKR LES 2-4: CPT | Performed by: PODIATRIST

## 2020-10-27 ENCOUNTER — TELEPHONE (OUTPATIENT)
Dept: PHYSICAL THERAPY | Facility: CLINIC | Age: 80
End: 2020-10-27

## 2020-10-27 ENCOUNTER — OFFICE VISIT (OUTPATIENT)
Dept: PHYSICAL THERAPY | Facility: CLINIC | Age: 80
End: 2020-10-27
Payer: MEDICARE

## 2020-10-27 DIAGNOSIS — M25.562 CHRONIC PAIN OF LEFT KNEE: ICD-10-CM

## 2020-10-27 DIAGNOSIS — M17.0 PRIMARY OSTEOARTHRITIS OF BOTH KNEES: Primary | ICD-10-CM

## 2020-10-27 DIAGNOSIS — G89.29 CHRONIC PAIN OF LEFT KNEE: ICD-10-CM

## 2020-10-28 ENCOUNTER — HOSPITAL ENCOUNTER (EMERGENCY)
Facility: HOSPITAL | Age: 80
Discharge: HOME | End: 2020-10-28
Attending: EMERGENCY MEDICINE | Admitting: EMERGENCY MEDICINE
Payer: MEDICARE

## 2020-10-28 VITALS
RESPIRATION RATE: 19 BRPM | BODY MASS INDEX: 37.52 KG/M2 | WEIGHT: 222 LBS | DIASTOLIC BLOOD PRESSURE: 94 MMHG | HEART RATE: 90 BPM | OXYGEN SATURATION: 95 % | TEMPERATURE: 96.9 F | SYSTOLIC BLOOD PRESSURE: 142 MMHG

## 2020-10-28 DIAGNOSIS — R04.0 EPISTAXIS, RECURRENT: Primary | ICD-10-CM

## 2020-10-28 LAB
APTT PPP: 37 SECONDS (ref 23–37)
INR PPP: 2.37 (ref 0.84–1.19)
PROTHROMBIN TIME: 25.6 SECONDS (ref 11.6–14.5)

## 2020-10-28 PROCEDURE — 99282 EMERGENCY DEPT VISIT SF MDM: CPT | Performed by: EMERGENCY MEDICINE

## 2020-10-28 PROCEDURE — 36415 COLL VENOUS BLD VENIPUNCTURE: CPT | Performed by: EMERGENCY MEDICINE

## 2020-10-28 PROCEDURE — 85610 PROTHROMBIN TIME: CPT | Performed by: EMERGENCY MEDICINE

## 2020-10-28 PROCEDURE — 99283 EMERGENCY DEPT VISIT LOW MDM: CPT

## 2020-10-28 PROCEDURE — 85730 THROMBOPLASTIN TIME PARTIAL: CPT | Performed by: EMERGENCY MEDICINE

## 2020-10-29 ENCOUNTER — APPOINTMENT (OUTPATIENT)
Dept: PHYSICAL THERAPY | Facility: CLINIC | Age: 80
End: 2020-10-29
Payer: MEDICARE

## 2020-11-03 ENCOUNTER — EVALUATION (OUTPATIENT)
Dept: PHYSICAL THERAPY | Facility: CLINIC | Age: 80
End: 2020-11-03
Payer: MEDICARE

## 2020-11-03 ENCOUNTER — TELEPHONE (OUTPATIENT)
Dept: INTERNAL MEDICINE CLINIC | Facility: CLINIC | Age: 80
End: 2020-11-03

## 2020-11-03 DIAGNOSIS — M25.562 CHRONIC PAIN OF LEFT KNEE: ICD-10-CM

## 2020-11-03 DIAGNOSIS — G89.29 CHRONIC PAIN OF LEFT KNEE: ICD-10-CM

## 2020-11-03 DIAGNOSIS — M17.0 PRIMARY OSTEOARTHRITIS OF BOTH KNEES: Primary | ICD-10-CM

## 2020-11-03 PROCEDURE — 97112 NEUROMUSCULAR REEDUCATION: CPT

## 2020-11-03 PROCEDURE — 97110 THERAPEUTIC EXERCISES: CPT

## 2020-11-05 ENCOUNTER — OFFICE VISIT (OUTPATIENT)
Dept: PHYSICAL THERAPY | Facility: CLINIC | Age: 80
End: 2020-11-05
Payer: MEDICARE

## 2020-11-05 DIAGNOSIS — M17.0 PRIMARY OSTEOARTHRITIS OF BOTH KNEES: Primary | ICD-10-CM

## 2020-11-05 DIAGNOSIS — M25.562 CHRONIC PAIN OF LEFT KNEE: ICD-10-CM

## 2020-11-05 DIAGNOSIS — K21.9 GASTROESOPHAGEAL REFLUX DISEASE: ICD-10-CM

## 2020-11-05 DIAGNOSIS — G89.29 CHRONIC PAIN OF LEFT KNEE: ICD-10-CM

## 2020-11-05 PROCEDURE — 97110 THERAPEUTIC EXERCISES: CPT

## 2020-11-05 PROCEDURE — 97112 NEUROMUSCULAR REEDUCATION: CPT

## 2020-11-05 RX ORDER — FAMOTIDINE 40 MG/1
TABLET, FILM COATED ORAL
Qty: 90 TABLET | Refills: 1 | Status: SHIPPED | OUTPATIENT
Start: 2020-11-05 | End: 2021-04-27

## 2020-11-10 ENCOUNTER — OFFICE VISIT (OUTPATIENT)
Dept: PHYSICAL THERAPY | Facility: CLINIC | Age: 80
End: 2020-11-10
Payer: MEDICARE

## 2020-11-10 DIAGNOSIS — M17.0 PRIMARY OSTEOARTHRITIS OF BOTH KNEES: Primary | ICD-10-CM

## 2020-11-10 DIAGNOSIS — M25.562 CHRONIC PAIN OF LEFT KNEE: ICD-10-CM

## 2020-11-10 DIAGNOSIS — G89.29 CHRONIC PAIN OF LEFT KNEE: ICD-10-CM

## 2020-11-10 PROCEDURE — 97112 NEUROMUSCULAR REEDUCATION: CPT

## 2020-11-10 PROCEDURE — 97110 THERAPEUTIC EXERCISES: CPT

## 2020-11-11 ENCOUNTER — OFFICE VISIT (OUTPATIENT)
Dept: OBGYN CLINIC | Facility: CLINIC | Age: 80
End: 2020-11-11

## 2020-11-11 ENCOUNTER — APPOINTMENT (OUTPATIENT)
Dept: RADIOLOGY | Facility: CLINIC | Age: 80
End: 2020-11-11
Payer: MEDICARE

## 2020-11-11 VITALS
HEART RATE: 64 BPM | DIASTOLIC BLOOD PRESSURE: 77 MMHG | WEIGHT: 222.8 LBS | BODY MASS INDEX: 37.12 KG/M2 | SYSTOLIC BLOOD PRESSURE: 117 MMHG | HEIGHT: 65 IN

## 2020-11-11 DIAGNOSIS — Z47.1 AFTERCARE FOLLOWING LEFT KNEE JOINT REPLACEMENT SURGERY: Primary | ICD-10-CM

## 2020-11-11 DIAGNOSIS — Z96.652 AFTERCARE FOLLOWING LEFT KNEE JOINT REPLACEMENT SURGERY: ICD-10-CM

## 2020-11-11 DIAGNOSIS — Z47.1 AFTERCARE FOLLOWING LEFT KNEE JOINT REPLACEMENT SURGERY: ICD-10-CM

## 2020-11-11 DIAGNOSIS — Z96.652 AFTERCARE FOLLOWING LEFT KNEE JOINT REPLACEMENT SURGERY: Primary | ICD-10-CM

## 2020-11-11 DIAGNOSIS — Z96.652 STATUS POST TOTAL KNEE REPLACEMENT USING CEMENT, LEFT: ICD-10-CM

## 2020-11-11 PROCEDURE — 73562 X-RAY EXAM OF KNEE 3: CPT

## 2020-11-11 PROCEDURE — 99024 POSTOP FOLLOW-UP VISIT: CPT | Performed by: ORTHOPAEDIC SURGERY

## 2020-11-11 RX ORDER — CLINDAMYCIN HYDROCHLORIDE 300 MG/1
600 CAPSULE ORAL
Qty: 2 CAPSULE | Refills: 1 | Status: SHIPPED | OUTPATIENT
Start: 2020-11-11 | End: 2021-06-03 | Stop reason: ALTCHOICE

## 2020-11-12 ENCOUNTER — OFFICE VISIT (OUTPATIENT)
Dept: PHYSICAL THERAPY | Facility: CLINIC | Age: 80
End: 2020-11-12
Payer: MEDICARE

## 2020-11-12 DIAGNOSIS — M25.562 CHRONIC PAIN OF LEFT KNEE: ICD-10-CM

## 2020-11-12 DIAGNOSIS — M17.0 PRIMARY OSTEOARTHRITIS OF BOTH KNEES: Primary | ICD-10-CM

## 2020-11-12 DIAGNOSIS — G89.29 CHRONIC PAIN OF LEFT KNEE: ICD-10-CM

## 2020-11-12 PROCEDURE — 97110 THERAPEUTIC EXERCISES: CPT

## 2020-11-12 PROCEDURE — 97112 NEUROMUSCULAR REEDUCATION: CPT

## 2020-11-17 ENCOUNTER — OFFICE VISIT (OUTPATIENT)
Dept: PHYSICAL THERAPY | Facility: CLINIC | Age: 80
End: 2020-11-17
Payer: MEDICARE

## 2020-11-17 DIAGNOSIS — M17.0 PRIMARY OSTEOARTHRITIS OF BOTH KNEES: Primary | ICD-10-CM

## 2020-11-17 DIAGNOSIS — M25.562 CHRONIC PAIN OF LEFT KNEE: ICD-10-CM

## 2020-11-17 DIAGNOSIS — G89.29 CHRONIC PAIN OF LEFT KNEE: ICD-10-CM

## 2020-11-17 PROCEDURE — 97110 THERAPEUTIC EXERCISES: CPT

## 2020-11-17 PROCEDURE — 97112 NEUROMUSCULAR REEDUCATION: CPT

## 2020-11-19 ENCOUNTER — OFFICE VISIT (OUTPATIENT)
Dept: PHYSICAL THERAPY | Facility: CLINIC | Age: 80
End: 2020-11-19
Payer: MEDICARE

## 2020-11-19 DIAGNOSIS — G89.29 CHRONIC PAIN OF LEFT KNEE: ICD-10-CM

## 2020-11-19 DIAGNOSIS — M17.0 PRIMARY OSTEOARTHRITIS OF BOTH KNEES: Primary | ICD-10-CM

## 2020-11-19 DIAGNOSIS — M25.562 CHRONIC PAIN OF LEFT KNEE: ICD-10-CM

## 2020-11-19 PROCEDURE — 97110 THERAPEUTIC EXERCISES: CPT

## 2020-11-19 PROCEDURE — 97112 NEUROMUSCULAR REEDUCATION: CPT

## 2020-11-24 ENCOUNTER — ANTICOAG VISIT (OUTPATIENT)
Dept: CARDIOLOGY CLINIC | Facility: CLINIC | Age: 80
End: 2020-11-24

## 2020-11-24 ENCOUNTER — TRANSCRIBE ORDERS (OUTPATIENT)
Dept: ADMINISTRATIVE | Facility: HOSPITAL | Age: 80
End: 2020-11-24

## 2020-11-24 ENCOUNTER — OFFICE VISIT (OUTPATIENT)
Dept: PHYSICAL THERAPY | Facility: CLINIC | Age: 80
End: 2020-11-24
Payer: MEDICARE

## 2020-11-24 ENCOUNTER — LAB (OUTPATIENT)
Dept: LAB | Facility: HOSPITAL | Age: 80
End: 2020-11-24
Payer: MEDICARE

## 2020-11-24 DIAGNOSIS — M17.0 PRIMARY OSTEOARTHRITIS OF BOTH KNEES: Primary | ICD-10-CM

## 2020-11-24 DIAGNOSIS — M25.562 CHRONIC PAIN OF LEFT KNEE: ICD-10-CM

## 2020-11-24 DIAGNOSIS — G89.29 CHRONIC PAIN OF LEFT KNEE: ICD-10-CM

## 2020-11-24 DIAGNOSIS — I48.91 ATRIAL FIBRILLATION, UNSPECIFIED TYPE (HCC): ICD-10-CM

## 2020-11-24 PROCEDURE — 97110 THERAPEUTIC EXERCISES: CPT

## 2020-11-24 PROCEDURE — 97112 NEUROMUSCULAR REEDUCATION: CPT

## 2020-12-02 ENCOUNTER — OFFICE VISIT (OUTPATIENT)
Dept: PHYSICAL THERAPY | Facility: CLINIC | Age: 80
End: 2020-12-02
Payer: MEDICARE

## 2020-12-02 DIAGNOSIS — M25.562 CHRONIC PAIN OF LEFT KNEE: ICD-10-CM

## 2020-12-02 DIAGNOSIS — M17.0 PRIMARY OSTEOARTHRITIS OF BOTH KNEES: Primary | ICD-10-CM

## 2020-12-02 DIAGNOSIS — G89.29 CHRONIC PAIN OF LEFT KNEE: ICD-10-CM

## 2020-12-02 PROCEDURE — 97110 THERAPEUTIC EXERCISES: CPT

## 2020-12-02 PROCEDURE — 97112 NEUROMUSCULAR REEDUCATION: CPT

## 2020-12-08 ENCOUNTER — APPOINTMENT (OUTPATIENT)
Dept: PHYSICAL THERAPY | Facility: CLINIC | Age: 80
End: 2020-12-08
Payer: MEDICARE

## 2020-12-08 ENCOUNTER — LAB (OUTPATIENT)
Dept: LAB | Facility: HOSPITAL | Age: 80
End: 2020-12-08
Payer: MEDICARE

## 2020-12-08 ENCOUNTER — ANTICOAG VISIT (OUTPATIENT)
Dept: CARDIOLOGY CLINIC | Facility: CLINIC | Age: 80
End: 2020-12-08

## 2020-12-08 DIAGNOSIS — E55.9 VITAMIN D DEFICIENCY: ICD-10-CM

## 2020-12-08 DIAGNOSIS — I48.20 CHRONIC ATRIAL FIBRILLATION (HCC): ICD-10-CM

## 2020-12-08 DIAGNOSIS — I10 ESSENTIAL HYPERTENSION: ICD-10-CM

## 2020-12-08 DIAGNOSIS — I48.91 ATRIAL FIBRILLATION, UNSPECIFIED TYPE (HCC): ICD-10-CM

## 2020-12-08 DIAGNOSIS — E11.42 DIABETIC POLYNEUROPATHY ASSOCIATED WITH TYPE 2 DIABETES MELLITUS (HCC): ICD-10-CM

## 2020-12-08 LAB
25(OH)D3 SERPL-MCNC: 67.5 NG/ML (ref 30–100)
ALBUMIN SERPL BCP-MCNC: 3.7 G/DL (ref 3.5–5)
ALP SERPL-CCNC: 82 U/L (ref 46–116)
ALT SERPL W P-5'-P-CCNC: 35 U/L (ref 12–78)
ANION GAP SERPL CALCULATED.3IONS-SCNC: 4 MMOL/L (ref 4–13)
AST SERPL W P-5'-P-CCNC: 29 U/L (ref 5–45)
BILIRUB SERPL-MCNC: 0.7 MG/DL (ref 0.2–1)
BUN SERPL-MCNC: 24 MG/DL (ref 5–25)
CALCIUM SERPL-MCNC: 9.4 MG/DL (ref 8.3–10.1)
CHLORIDE SERPL-SCNC: 105 MMOL/L (ref 100–108)
CHOLEST SERPL-MCNC: 115 MG/DL (ref 50–200)
CO2 SERPL-SCNC: 33 MMOL/L (ref 21–32)
CREAT SERPL-MCNC: 1.05 MG/DL (ref 0.6–1.3)
EST. AVERAGE GLUCOSE BLD GHB EST-MCNC: 111 MG/DL
GFR SERPL CREATININE-BSD FRML MDRD: 51 ML/MIN/1.73SQ M
GLUCOSE P FAST SERPL-MCNC: 90 MG/DL (ref 65–99)
HBA1C MFR BLD: 5.5 %
HDLC SERPL-MCNC: 40 MG/DL
LDLC SERPL CALC-MCNC: 61 MG/DL (ref 0–100)
NONHDLC SERPL-MCNC: 75 MG/DL
POTASSIUM SERPL-SCNC: 4.3 MMOL/L (ref 3.5–5.3)
PROT SERPL-MCNC: 7.6 G/DL (ref 6.4–8.2)
SODIUM SERPL-SCNC: 142 MMOL/L (ref 136–145)
TRIGL SERPL-MCNC: 69 MG/DL

## 2020-12-08 PROCEDURE — 80053 COMPREHEN METABOLIC PANEL: CPT

## 2020-12-08 PROCEDURE — 82306 VITAMIN D 25 HYDROXY: CPT

## 2020-12-08 PROCEDURE — 83036 HEMOGLOBIN GLYCOSYLATED A1C: CPT

## 2020-12-08 PROCEDURE — 80061 LIPID PANEL: CPT

## 2020-12-15 ENCOUNTER — APPOINTMENT (OUTPATIENT)
Dept: PHYSICAL THERAPY | Facility: CLINIC | Age: 80
End: 2020-12-15
Payer: MEDICARE

## 2020-12-18 ENCOUNTER — TELEPHONE (OUTPATIENT)
Dept: NEUROLOGY | Facility: CLINIC | Age: 80
End: 2020-12-18

## 2020-12-22 ENCOUNTER — TELEMEDICINE (OUTPATIENT)
Dept: INTERNAL MEDICINE CLINIC | Facility: CLINIC | Age: 80
End: 2020-12-22
Payer: MEDICARE

## 2020-12-22 VITALS — WEIGHT: 222 LBS | HEIGHT: 65 IN | BODY MASS INDEX: 36.99 KG/M2

## 2020-12-22 DIAGNOSIS — Z96.652 STATUS POST TOTAL KNEE REPLACEMENT USING CEMENT, LEFT: Primary | ICD-10-CM

## 2020-12-22 DIAGNOSIS — I48.91 ATRIAL FIBRILLATION, UNSPECIFIED TYPE (HCC): ICD-10-CM

## 2020-12-22 DIAGNOSIS — K22.719 BARRETT'S ESOPHAGUS WITH DYSPLASIA: ICD-10-CM

## 2020-12-22 DIAGNOSIS — M79.89 LEG SWELLING: ICD-10-CM

## 2020-12-22 DIAGNOSIS — R73.03 PREDIABETES: ICD-10-CM

## 2020-12-22 DIAGNOSIS — E66.01 SEVERE OBESITY (BMI 35.0-39.9) WITH COMORBIDITY (HCC): ICD-10-CM

## 2020-12-22 DIAGNOSIS — I10 ESSENTIAL HYPERTENSION: ICD-10-CM

## 2020-12-22 DIAGNOSIS — G25.81 RLS (RESTLESS LEGS SYNDROME): ICD-10-CM

## 2020-12-22 DIAGNOSIS — M85.89 OSTEOPENIA OF MULTIPLE SITES: ICD-10-CM

## 2020-12-22 DIAGNOSIS — H53.8 BLURRING OF VISION: ICD-10-CM

## 2020-12-22 PROBLEM — M17.12 OSTEOARTHRITIS OF LEFT KNEE: Status: RESOLVED | Noted: 2018-01-02 | Resolved: 2020-12-22

## 2020-12-22 PROCEDURE — 99214 OFFICE O/P EST MOD 30 MIN: CPT | Performed by: INTERNAL MEDICINE

## 2020-12-23 ENCOUNTER — APPOINTMENT (OUTPATIENT)
Dept: LAB | Facility: CLINIC | Age: 80
End: 2020-12-23
Payer: MEDICARE

## 2020-12-24 ENCOUNTER — ANTICOAG VISIT (OUTPATIENT)
Dept: CARDIOLOGY CLINIC | Facility: CLINIC | Age: 80
End: 2020-12-24

## 2020-12-24 DIAGNOSIS — I48.91 ATRIAL FIBRILLATION, UNSPECIFIED TYPE (HCC): ICD-10-CM

## 2021-01-04 ENCOUNTER — OFFICE VISIT (OUTPATIENT)
Dept: NEUROLOGY | Facility: CLINIC | Age: 81
End: 2021-01-04
Payer: MEDICARE

## 2021-01-04 VITALS
SYSTOLIC BLOOD PRESSURE: 112 MMHG | BODY MASS INDEX: 37.32 KG/M2 | WEIGHT: 224 LBS | TEMPERATURE: 96.4 F | HEIGHT: 65 IN | HEART RATE: 86 BPM | DIASTOLIC BLOOD PRESSURE: 74 MMHG

## 2021-01-04 DIAGNOSIS — I48.20 CHRONIC ATRIAL FIBRILLATION (HCC): ICD-10-CM

## 2021-01-04 DIAGNOSIS — G62.9 LARGE FIBER NEUROPATHY: Primary | ICD-10-CM

## 2021-01-04 DIAGNOSIS — G25.81 RLS (RESTLESS LEGS SYNDROME): ICD-10-CM

## 2021-01-04 PROCEDURE — 99214 OFFICE O/P EST MOD 30 MIN: CPT | Performed by: PSYCHIATRY & NEUROLOGY

## 2021-01-04 RX ORDER — SENNOSIDES 8.6 MG
1300 CAPSULE ORAL DAILY PRN
COMMUNITY
End: 2022-03-29 | Stop reason: HOSPADM

## 2021-01-04 RX ORDER — PRAMIPEXOLE DIHYDROCHLORIDE 0.5 MG/1
TABLET ORAL
Qty: 180 TABLET | Refills: 1 | Status: SHIPPED | OUTPATIENT
Start: 2021-01-04 | End: 2021-01-05 | Stop reason: SDUPTHER

## 2021-01-04 NOTE — PROGRESS NOTES
Return NeuroOutpatient Note        Mk Garcia  [id number]  68 y o   1940       Neurologic Problem (peripheral polyneuropathy)        History obtained from:  Patient     HPI/Subjective:    Mk Pair is a 67 yo F with PMH of A fib, restless legs presents as f/u  She is a former patient of Dr Cammie Lainez  Patient has borderline diabetes  She reports pins and needle sensation in feet and legs, at times in hands  Patient has been on gabapentin 800mg qid  She says since Covid she hasn't been doing a lot of physical work so her legs feel better  She also suffers from RLS  She's on mirapex 0 75mg total daily  She was asked to take 2 of pramipexole and she says it has made a big difference        She lives with her   Her memory is not good  She has hard time remembering when her conditions started       She is getting left knee replaced on Aug 12th         Past Medical History:   Diagnosis Date    Atrial fibrillation ([facility] )     Carrero's esophagus     last assessed: 1/23/2018    BRCA1 negative     BRCA2 negative     Breast cancer ([facility] ) 2006    stage 1 (left), given adjuvant radiation with Arimidex x 5 years    Cancer [facility]) 2006    Left Breast, Lumpectomy    Cataract     last assessed: 3/11/2014    Dysplasia of toenail     last assessed: 8/29/2017    Esophageal reflux     GERD (gastroesophageal reflux disease)     Gross hematuria     last assessed: 2/19/2015    Hematuria     Hiatal hernia     History of radiation therapy     Hypertension     Irregular heart beat     AFIB    Mixed sensory-motor polyneuropathy     Neuropathy     Obesity     Pacemaker     Paroxysmal atrial fibrillation (HCC)     Peripheral neuropathy     Rectal bleeding     Restless leg syndrome     Shortness of breath     last assessed: 1/11/2016     Social History     Socioeconomic History    Marital status: /Civil Union     Spouse name: Not on file    Number of children: Not on file    Years of education: Not on file    Highest education level: Not on file   Occupational History    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     Comment: retired   Social Needs    Financial resource strain: Not on file    Food insecurity     Worry: Not on file     Inability: Not on file   [employer] needs     Medical: Not on file     Non-medical: Not on file   Tobacco Use    Smoking status: Former Smoker     Packs/day: 1 00     Years: 25 00     Pack years: 25 00     Types: Cigarettes     Quit date: 1980     Years since quittin 3    Smokeless tobacco: Never Used    Tobacco comment: Quit over 30 years ago; quit age 41   Substance and Sexual Activity    Alcohol use: Not Currently    Drug use: No    Sexual activity: Not on file   Lifestyle    Physical activity     Days per week: Not on file     Minutes per session: Not on file    Stress: Not on file   Relationships    Social connections     Talks on phone: Not on file     Gets together: Not on file     Attends Congregation service: Not on file     Active member of club or organization: Not on file     Attends meetings of clubs or organizations: Not on file     Relationship status: Not on file    Intimate partner violence     Fear of current or ex partner: Not on file     Emotionally abused: Not on file     Physically abused: Not on file     Forced sexual activity: Not on file   Other Topics Concern    Not on file   Social History Narrative         Family History   Problem Relation Age of Onset    Hypertension Mother     Heart disease Father     Aneurysm Father     Coronary artery disease Father         in his 76s with aneurysm    Aortic aneurysm Father         abdominal    Scleroderma Sister     Breast cancer Sister 76    Hypertension Sister     Cancer Sister     No Known Problems Son     No Known Problems Son     Testicular cancer Son 39    Thyroid cancer Son 45    Colon cancer Maternal Aunt     Colon cancer Maternal Aunt     Breast cancer Other 48        kaylee's daughter    Alcohol abuse Neg Hx     Substance Abuse Neg Hx     Mental illness Neg Hx     Depression Neg Hx      Allergies   Allergen Reactions    Cephalexin Rash    Duloxetine Hcl Other (See Comments)     Facial pins and needles sensation    Erythromycin Rash    Levofloxacin Other (See Comments)     Muscular aches    Penicillins Rash    Savella [Milnacipran] Rash    Sulfa Antibiotics Rash     Current Outpatient Medications on File Prior to Visit   Medication Sig Dispense Refill    acetaminophen (TYLENOL) 650 mg CR tablet Take 1,300 mg by mouth daily      clindamycin (CLEOCIN) 300 MG capsule Take 2 capsules (600 mg total) by mouth 60 minutes pre-procedure 2 capsule 1    clobetasol (TEMOVATE) 0 05 % ointment Apply once weekly to the affected area 30 g 3    famotidine (PEPCID) 40 MG tablet TAKE 1 TABLET BY MOUTH EVERY DAY 90 tablet 1    furosemide (LASIX) 40 mg tablet Take 1 tablet (40 mg total) by mouth daily 90 tablet 3    gabapentin (NEURONTIN) 800 mg tablet Take 1 tablet (800 mg total) by mouth 4 (four) times a day 360 tablet 1    loratadine (CLARITIN) 10 mg tablet Take 10 mg by mouth daily      metoprolol tartrate (LOPRESSOR) 50 mg tablet Take 1 tablet (50 mg total) by mouth 2 (two) times a day 180 tablet 0    multivitamin (THERAGRAN) TABS Take 1 tablet by mouth daily      warfarin (COUMADIN) 7 5 mg tablet Take 1 tablet (7 5 mg total) by mouth daily 90 tablet 3    [DISCONTINUED] pramipexole (MIRAPEX) 0 5 mg tablet One and half tablet at 5:00 p m  and 10:00 p m  (Patient taking differently: 2 full tablets at 5:00 p m  and 10:00 p m ) 360 tablet 1    acetaminophen (TYLENOL) 325 mg tablet Take 3 tablets (975 mg total) by mouth every 8 (eight) hours (Patient not taking: Reported on 1/4/2021) 30 tablet 0    Calcium Acetate, Phos Binder, (CALCIUM ACETATE PO) Take by mouth daily        docusate sodium (COLACE) 100 mg capsule Take 1 capsule (100 mg total) by mouth 2 (two) times a day (Patient not taking: Reported on 1/4/2021) 60 capsule 0     No current facility-administered medications on file prior to visit  Review of Systems   Refer to positive review of systems in HPI     Review of Systems    Constitutional- No fever  Eyes- No visual change  ENT- Hearing normal  CV- No chest pain  Resp- No Shortness of breath  GI- No diarrhea  - Bladder normal  MS- No Arthritis   Skin- No rash  Psych- No depression  Endo- No DM  Heme- No nodes    Vitals:    01/04/21 1126   BP: 112/74   BP Location: Right arm   Patient Position: Sitting   Cuff Size: Large   Pulse: 86   Temp: (!) 96 4 °F (35 8 °C)   Weight: 102 kg (224 lb)   Height: 5' 4 5" (1 638 m)       PHYSICAL EXAM:  Appearance: No Acute Distress  Ophthalmoscopic: Disc Flat, Normal fundus  Mental status:  Orientation: Awake, Alert, and Orientedx3  Memory: Registation 3/3 Recall 3/3  Attention: normal  Knowledge: good  Language: No aphasia  Speech: No dysarthria  Cranial Nerves:  2 No Visual Defect on Confrontation, Pupils round, equal, reactive to light  3,4,6 Extraocular Movements Intact, no nystagmus  5 Facial Sensation Intact  7 No facial asymmetry  8 Intact hearing  9,10 Palate symmetric, normal gag  11 Good shoulder shrug  12 Tongue Midline  Gait: Stable  Coordination: No ataxia with finger to nose testing, and heel to shin  Sensory: Intact, Symmetric to pinprick, light touch, vibration, and joint position  Muscle Tone: Normal              Muscle exam:  Arm Right Left Leg Right Left   Deltoid 5/5 5/5 Iliopsoas 5/5 5/5   Biceps 5/5 5/5 Quads 5/5 5/5   Triceps 5/5 5/5 Hamstrings 5/5 5/5   Wrist Extension 5/5 5/5 Ankle Dorsi Flexion 5/5 5/5   Wrist Flexion 5/5 5/5 Ankle Plantar Flexion 5/5 5/5   Interossei 5/5 5/5 Ankle Eversion 5/5 5/5   APB 5/5 5/5 Ankle Inversion 5/5 5/5       Reflexes   RJ BJ TJ KJ AJ Plantars Baig's   Right 2+ 2+ 2+ 1+ 0 Downgoing Not present   Left 2+ 2+ 2+ +1 0 Downgoing Not present     Personal review of  Labs:                  Diagnoses and all orders for this visit:        1  Large fiber neuropathy     2  RLS (restless legs syndrome)  pramipexole (MIRAPEX) 0 5 mg tablet   3  Chronic atrial fibrillation ([address] )       Patient is doing well on 1mg pramipexole  She's on gabapentin 800mg qid which is controlling her symptoms  She is to resume eliquis 5mg bid  Encouraged physical exercise                     Total time of encounter:  30 min  More than 50% of the time was used in counseling and/or coordination of care  Extent of counseling and/or coordination of care        Adin Vilchis MD  [address] [facility]  [address]  [address]  [phone]

## 2021-01-05 ENCOUNTER — OFFICE VISIT (OUTPATIENT)
Dept: PODIATRY | Facility: CLINIC | Age: 81
End: 2021-01-05
Payer: MEDICARE

## 2021-01-05 VITALS
SYSTOLIC BLOOD PRESSURE: 136 MMHG | WEIGHT: 224 LBS | HEIGHT: 65 IN | RESPIRATION RATE: 17 BRPM | BODY MASS INDEX: 37.32 KG/M2 | HEART RATE: 93 BPM | DIASTOLIC BLOOD PRESSURE: 81 MMHG

## 2021-01-05 DIAGNOSIS — M79.672 PAIN IN BOTH FEET: ICD-10-CM

## 2021-01-05 DIAGNOSIS — G60.8 SENSORY POLYNEUROPATHY: ICD-10-CM

## 2021-01-05 DIAGNOSIS — I70.209 PERIPHERAL ARTERIOSCLEROSIS (HCC): ICD-10-CM

## 2021-01-05 DIAGNOSIS — M79.671 PAIN IN BOTH FEET: ICD-10-CM

## 2021-01-05 DIAGNOSIS — E11.51 TYPE 2 DIABETES MELLITUS WITH DIABETIC PERIPHERAL ANGIOPATHY WITHOUT GANGRENE, WITHOUT LONG-TERM CURRENT USE OF INSULIN (HCC): Primary | ICD-10-CM

## 2021-01-05 DIAGNOSIS — G62.9 LARGE FIBER NEUROPATHY: ICD-10-CM

## 2021-01-05 DIAGNOSIS — L84 CORNS: ICD-10-CM

## 2021-01-05 DIAGNOSIS — G25.81 RLS (RESTLESS LEGS SYNDROME): ICD-10-CM

## 2021-01-05 PROCEDURE — 11056 PARNG/CUTG B9 HYPRKR LES 2-4: CPT | Performed by: PODIATRIST

## 2021-01-05 RX ORDER — PRAMIPEXOLE DIHYDROCHLORIDE 0.5 MG/1
TABLET ORAL
Qty: 180 TABLET | Refills: 1 | Status: SHIPPED | OUTPATIENT
Start: 2021-01-05 | End: 2021-02-25

## 2021-01-05 RX ORDER — GABAPENTIN 800 MG/1
800 TABLET ORAL 4 TIMES DAILY
Qty: 360 TABLET | Refills: 1 | Status: SHIPPED | OUTPATIENT
Start: 2021-01-05 | End: 2021-07-06 | Stop reason: SDUPTHER

## 2021-01-05 NOTE — TELEPHONE ENCOUNTER
Patient calling in requesting refill of gabapentin for 90 day supply    Patient is also wondering if we can update her prescription for Mirapex to a 90 day supply as well as it is more affordable for her  Order for gabapentin pended below  Please enter updated script for Mirapex if agreeable  Thank you!

## 2021-01-05 NOTE — PROGRESS NOTES
Assessment/Plan:  Painful calluses   Diabetic neuropathy   Peripheral vascular disease   Chronic edema  Plan   Diabetic foot exam performed   All mycotic nails debrided   Plantar calluses debrided without pain or complication        Subjective   Patient is diabetic   She has pain with walking   She has pain with shoe wear   No history of trauma     Diabetic polyneuropathy associated with type 2 diabetes mellitus (HCC)     Corns     Pain in both feet     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HCC)     Chronic edema   Rule out deep venous insufficiency       Discussion/Summary  The patient was counseled regarding instructions for management,-- prognosis,-- patient and family education,-- risks and benefits of treatment options,-- importance of compliance with treatment  Patient is able to Self-Care  Possible side effects of new medications were reviewed with the patient/guardian today  The treatment plan was reviewed with the patient/guardian  The patient/guardian understands and agrees with the treatment plan      Chief Complaint  Patient has complaint of pain in her toes with ambulation   No history of trauma     History of Present Illness  HPI: Patient is doing well with Cymbalta however she began have facial tingling  She discontinued medicine   However the medication was relieving her neuropathic pain       Review of Systems     ROS reviewed       Active Problems     1  Achilles tendinitis of left lower extremity (726 71) (M76 62)   2  Acquired ankle/foot deformity (736 70) (M21 969)   3  AF (paroxysmal atrial fibrillation) (427 31) (I48 0)   4  Anticoagulant long-term use (V58 61) (Z79 01)   5  Arthritis (716 90) (M19 90)   6  At risk for bone density loss (V49 89) (Z91 89)   7  Atrial fibrillation (427 31) (I48 91)   8  History of Breast Cancer (V10 3)   9  Difficulty walking (719 7) (R26 2)   10  Encounter for screening mammogram for breast cancer (V76 12) (Z12 31)   11  Esophageal reflux (530 81) (K21 9)   12  Foot pain, bilateral (729 5) (M79 671,M79 672)   13  Hiatal hernia (553 3) (K44 9)   14  History of Carrero's esophagus (V12 79) (Z87 19)   15  History of fall (V15 88) (Z91 81)   16  Hypertension (401 9) (I10)   17  Leg pain, left (729 5) (I83 118)   18  Lichen sclerosus et atrophicus (701 0) (L90 0)   19  Lumbar radiculopathy (724 4) (M54 16)   20  Multiple lung nodules (793 19) (R91 8)   21  Obesity (278 00) (E66 9)   22  Onychomycosis (110 1) (B35 1)   23  Osteopenia (733 90) (M85 80)   24  Pacemaker (V45 01) (Z95 0)   25  Peripheral neuropathy (356 9) (G62 9)   26  Pes planus of both feet (734) (M21 41,M21 42)   27  Plantar fasciitis of left foot (728 71) (M72 2)   28  Restless legs syndrome (333 94) (G25 81)     Past Medical History   · History of Abnormal glucose (790 29) (R73 09)   · Atrial fibrillation (427 31) (I48 91)   · History of Breast Cancer (V10 3)   · 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)   · Esophageal reflux (530 81) (K21 9)   · Denied: History of Exposure To STD   · History of Gross hematuria (599 71) (R31 0)   · History of Hematoma (924 9) (T14 8XXA)   · History of Hematoma of lower extremity, right, initial encounter (924 5) (S80 11XA)   · History of backache (V13 59) (Z87 39)   · History of Acrrero's esophagus (V12 79) (Z87 19)   · History of cataract (V12 49) (Z86 69)   · History of chest pain (V13 89) (Z87 898)   · History of fall (V15 88) (Z91 81)   · History of hematuria (V13 09) (Z87 448)   · History of postmenopausal hormone replacement therapy (V87 49) (Z92 29)   · History of shortness of breath (V13 89) (T38 017)   · History of urinary incontinence (V13 09) (Z87 898)   · History of Neck pain (723 1) (M54 2)   · Normal delivery (650) (O80,Z37  9)   · History of Right knee pain (719 46) (M25 561)   · History of Ringing In The Ears (Tinnitus) (388 30)   · History of Skin rash (782 1) (R21)   · History of Traumatic blister of right lower extremity, initial encounter (916  2) (C91 725I)   · History of UTI (lower urinary tract infection) (599 0) (N39 0)     The active problems and past medical history were reviewed and updated today       Surgical History   · Denied: History of Abnormal Pap Smear Of Cervix   · History of Breast Surgery Lumpectomy   · History of Cataract Surgery   · History of Diagnostic Cystoscopy   · History of Excision Lesion Of Meniscus Or Capsule Knee   · History of Pacemaker - Pulse Generator Replacement   · History of Pacemaker Placement   · History of Pacemaker Placement   · History of Radiation Therapy   · History of Total Abdominal Hysterectomy With Removal Of Both Ovaries     The surgical history was reviewed and updated today        Family History  Mother    · Denied: Family history of Alcoholism and drug addiction in family   · Denied: Family history of Anxiety and depression  Father    · Denied: Family history of Alcoholism and drug addiction in family   · Denied: Family history of Anxiety and depression   · Family history of Coronary Artery Disease (V17 49)   · Family history of abdominal aortic aneurysm (V17 49) (Z82 49)  Child    · Denied: Family history of Alcoholism and drug addiction in family   · Denied: Family history of Anxiety and depression  Sibling    · Denied: Family history of Alcoholism and drug addiction in family   · Denied: Family history of Anxiety and depression  Sister    · Family history of Breast Cancer (V16 3)  Maternal Aunt    · Family history of Colon Cancer (V16 0)   · Family history of Colon Cancer (V16 0)  Family History    · Denied: Family history of Diabetes Mellitus   · Denied: Family history of Stroke Syndrome     The family history was reviewed and updated today        Social History      · Being A Social Drinker   · Denied: History of Drug Use   · Former smoker (V15 82) (N42 191)   · 25 pack years, quit age 41   · Marital History - Currently    · Retired From Work   · 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  The social history was reviewed and updated today       The medication list was reviewed and updated today        Allergies  1  duloxetine   2  LevoFLOXacin TABS   3  Cephalexin CAPS   4  Erythromycin Base TABS   5  Keflex TABS   6  Penicillins   7  Sulfa Drugs        Physical Exam  Left Foot: Appearance: Normal except as noted: excessive pronation-- and-- pes planus  Tenderness: None except the lisfranc joint-- and-- medial longitudinal arch  ROM: subtalar motion was restricted  Right Foot: Appearance: Normal except as noted: excessive pronation-- and-- pes planus  Tenderness: None except the lisfranc joint-- and-- medial longitudinal arch  ROM: subtalar motion was restricted   Left Ankle: ROM: limited ROM in all planes   Right Ankle: ROM: limited ROM in all planes   Neurological Exam: performed  Light touch was decreased bilaterally  Vibratory sensation was decreased in both first metatarsophalangeal joints  Deep tendon reflexes: achilles reflex absent bilateraly-- and-- 4/5 L5 testing bilateral    Vascular Exam: performed Dorsalis pedis pulses were diminished bilaterally  Posterior tibial pulses were diminished bilaterally  Dependence rubor was present bilaterally  Capillary refill time was Negative digital hair noted, but-- between 1-3 seconds bilaterally  Toenails: All of the toenails were elongated,-- hypertrophied,-- discolored-- and--   Hyperkeratosis: present on both first toes  Shoe Gear Evaluation: performed ()  Recommendation(s): SAS style       Patient's shoes and socks removed  Right Foot/Ankle   Right Foot Inspection        Sensory   Vibration: diminished  Proprioception: diminished      Vascular  Capillary refills: elevated        Left Foot/Ankle  Left Foot Inspection                    Sensory   Vibration: diminished  Proprioception: diminished     Vascular  Capillary refills: elevated  Patient's shoes and socks removed  Assign Risk Category:  Deformity present;  Loss of protective sensation; Weak pulses       Risk: 2

## 2021-01-06 DIAGNOSIS — I48.20 CHRONIC ATRIAL FIBRILLATION (HCC): ICD-10-CM

## 2021-01-06 RX ORDER — METOPROLOL TARTRATE 50 MG/1
TABLET, FILM COATED ORAL
Qty: 270 TABLET | Refills: 2 | Status: SHIPPED | OUTPATIENT
Start: 2021-01-06 | End: 2021-10-04 | Stop reason: SDUPTHER

## 2021-01-19 ENCOUNTER — TELEPHONE (OUTPATIENT)
Dept: NEUROLOGY | Facility: CLINIC | Age: 81
End: 2021-01-19

## 2021-01-19 NOTE — TELEPHONE ENCOUNTER
Patient came into the office today with a letter that her pharmacy benefits will not cover more than 90 tabs per 30 days  Called [facility] and spoke w/ Terry Olivares  She confirmed that this medication/dose will require a p/a  Submitted p/a via [facility] Case ID: [id number]

## 2021-01-28 ENCOUNTER — REMOTE DEVICE CLINIC VISIT (OUTPATIENT)
Dept: CARDIOLOGY CLINIC | Facility: CLINIC | Age: 81
End: 2021-01-28
Payer: MEDICARE

## 2021-01-28 DIAGNOSIS — Z95.0 CARDIAC PACEMAKER IN SITU: Primary | ICD-10-CM

## 2021-01-28 PROCEDURE — 93296 REM INTERROG EVL PM/IDS: CPT | Performed by: INTERNAL MEDICINE

## 2021-01-28 PROCEDURE — 93294 REM INTERROG EVL PM/LDLS PM: CPT | Performed by: INTERNAL MEDICINE

## 2021-01-28 NOTE — PROGRESS NOTES
Results for orders placed or performed in visit on 01/28/21   Cardiac EP device report    Narrative    MDT-SINGLE-PM/ NOT MRI CONDITIONAL  CARELINK TRANSMISSION: BATTERY VOLTAGE ADEQUATE (2 5 YRS)  -20%  ALL AVAILABLE LEAD PARAMETERS WITHIN NORMAL LIMITS  [address] 4 5 MINS- RVR ON AVAILABLE EGM'S BUT CANNOT EXCLUDE ANY NSVT  HX OF SAME  EF-60% (ECHO 11/28/18)  HX: CHRONIC AF & ON WARFARIN & METOPROLOL  NORMAL DEVICE FUNCTION   GV

## 2021-01-30 ENCOUNTER — LAB (OUTPATIENT)
Dept: LAB | Facility: HOSPITAL | Age: 81
End: 2021-01-30
Payer: MEDICARE

## 2021-01-30 ENCOUNTER — TRANSCRIBE ORDERS (OUTPATIENT)
Dept: ADMINISTRATIVE | Facility: HOSPITAL | Age: 81
End: 2021-01-30

## 2021-01-30 DIAGNOSIS — I48.20 CHRONIC ATRIAL FIBRILLATION (HCC): ICD-10-CM

## 2021-01-30 LAB
INR PPP: 2.92 (ref 0.84–1.19)
PROTHROMBIN TIME: 30 SECONDS (ref 11.6–14.5)

## 2021-01-30 PROCEDURE — 85610 PROTHROMBIN TIME: CPT

## 2021-01-30 PROCEDURE — 36415 COLL VENOUS BLD VENIPUNCTURE: CPT

## 2021-02-01 ENCOUNTER — ANTICOAG VISIT (OUTPATIENT)
Dept: CARDIOLOGY CLINIC | Facility: CLINIC | Age: 81
End: 2021-02-01

## 2021-02-01 DIAGNOSIS — I48.20 CHRONIC ATRIAL FIBRILLATION (HCC): ICD-10-CM

## 2021-02-12 DIAGNOSIS — Z23 ENCOUNTER FOR IMMUNIZATION: ICD-10-CM

## 2021-02-25 DIAGNOSIS — G25.81 RLS (RESTLESS LEGS SYNDROME): ICD-10-CM

## 2021-02-25 RX ORDER — PRAMIPEXOLE DIHYDROCHLORIDE 0.5 MG/1
TABLET ORAL
Qty: 120 TABLET | Refills: 2 | Status: SHIPPED | OUTPATIENT
Start: 2021-02-25 | End: 2021-05-28

## 2021-02-27 ENCOUNTER — TRANSCRIBE ORDERS (OUTPATIENT)
Dept: ADMINISTRATIVE | Facility: HOSPITAL | Age: 81
End: 2021-02-27

## 2021-02-27 ENCOUNTER — LAB (OUTPATIENT)
Dept: LAB | Facility: HOSPITAL | Age: 81
End: 2021-02-27
Payer: MEDICARE

## 2021-03-01 ENCOUNTER — ANTICOAG VISIT (OUTPATIENT)
Dept: CARDIOLOGY CLINIC | Facility: CLINIC | Age: 81
End: 2021-03-01

## 2021-03-01 DIAGNOSIS — I48.20 CHRONIC ATRIAL FIBRILLATION (HCC): ICD-10-CM

## 2021-03-05 DIAGNOSIS — I48.91 ATRIAL FIBRILLATION, UNSPECIFIED TYPE (HCC): ICD-10-CM

## 2021-03-05 RX ORDER — FUROSEMIDE 40 MG/1
TABLET ORAL
Qty: 90 TABLET | Refills: 3 | Status: SHIPPED | OUTPATIENT
Start: 2021-03-05 | End: 2022-02-23

## 2021-03-11 ENCOUNTER — OFFICE VISIT (OUTPATIENT)
Dept: PODIATRY | Facility: CLINIC | Age: 81
End: 2021-03-11
Payer: MEDICARE

## 2021-03-11 VITALS
SYSTOLIC BLOOD PRESSURE: 136 MMHG | RESPIRATION RATE: 17 BRPM | WEIGHT: 224 LBS | HEART RATE: 93 BPM | HEIGHT: 65 IN | DIASTOLIC BLOOD PRESSURE: 81 MMHG | BODY MASS INDEX: 37.32 KG/M2

## 2021-03-11 DIAGNOSIS — I70.209 PERIPHERAL ARTERIOSCLEROSIS (HCC): ICD-10-CM

## 2021-03-11 DIAGNOSIS — L84 CORNS: ICD-10-CM

## 2021-03-11 DIAGNOSIS — M72.2 PLANTAR FASCIITIS: ICD-10-CM

## 2021-03-11 DIAGNOSIS — M79.672 PAIN IN BOTH FEET: ICD-10-CM

## 2021-03-11 DIAGNOSIS — M79.671 PAIN IN BOTH FEET: ICD-10-CM

## 2021-03-11 DIAGNOSIS — E11.51 TYPE 2 DIABETES MELLITUS WITH DIABETIC PERIPHERAL ANGIOPATHY WITHOUT GANGRENE, WITHOUT LONG-TERM CURRENT USE OF INSULIN (HCC): Primary | ICD-10-CM

## 2021-03-11 PROCEDURE — 11056 PARNG/CUTG B9 HYPRKR LES 2-4: CPT | Performed by: PODIATRIST

## 2021-03-11 PROCEDURE — 29540 STRAPPING ANKLE &/FOOT: CPT | Performed by: PODIATRIST

## 2021-03-11 NOTE — PROGRESS NOTES
Assessment/Plan:  Painful calluses   Diabetic neuropathy   Peripheral vascular disease   Chronic edema  Chronic plantar fasciitis left foot         Plan   Diabetic foot exam performed   All mycotic nails debrided   Plantar calluses debrided without pain or complication  Patient will stretch daily  Today, her left arch bound a low dye arch strapping fashion        Subjective   Patient is diabetic   She has pain with walking   She has pain with shoe wear   No history of trauma     Diabetic polyneuropathy associated with type 2 diabetes mellitus (HCC)     Corns     Pain in both feet     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HCC)     Chronic edema   Rule out deep venous insufficiency       Discussion/Summary  The patient was counseled regarding instructions for management,-- prognosis,-- patient and family education,-- risks and benefits of treatment options,-- importance of compliance with treatment  Patient is able to Self-Care  Possible side effects of new medications were reviewed with the patient/guardian today  The treatment plan was reviewed with the patient/guardian  The patient/guardian understands and agrees with the treatment plan      Chief Complaint  Patient has complaint of pain in her toes with ambulation   No history of trauma     History of Present Illness  HPI: Patient is doing well with Cymbalta however she began have facial tingling  She discontinued medicine   However the medication was relieving her neuropathic pain       Review of Systems     ROS reviewed       Active Problems     1  Achilles tendinitis of left lower extremity (726 71) (M76 62)   2  Acquired ankle/foot deformity (736 70) (M21 969)   3  AF (paroxysmal atrial fibrillation) (427 31) (I48 0)   4  Anticoagulant long-term use (V58 61) (Z79 01)   5  Arthritis (716 90) (M19 90)   6  At risk for bone density loss (V49 89) (Z91 89)   7  Atrial fibrillation (427 31) (I48 91)   8  History of Breast Cancer (V10 3)   9  Difficulty walking (719 7) (R26 2)   10  Encounter for screening mammogram for breast cancer (V76 12) (Z12 31)   11  Esophageal reflux (530 81) (K21 9)   12  Foot pain, bilateral (729 5) (M79 671,M79 672)   13  Hiatal hernia (553 3) (K44 9)   14  History of Carrero's esophagus (V12 79) (Z87 19)   15  History of fall (V15 88) (Z91 81)   16  Hypertension (401 9) (I10)   17  Leg pain, left (729 5) (C99 040)   18  Lichen sclerosus et atrophicus (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 0) (L90 0)   19  Lumbar radiculopathy (724 4) (M54 16)   20  Multiple lung nodules (793 19) (R91 8)   21  Obesity (278 00) (E66 9)   22  Onychomycosis (110 1) (B35 1)   23  Osteopenia (733 90) (M85 80)   24  Pacemaker (V45 01) (Z95 0)   25  Peripheral neuropathy (356 9) (G62 9)   26  Pes planus of both feet (734) (M21 41,M21 42)   27  Plantar fasciitis of left foot (728 71) (M72 2)   28  Restless legs syndrome (333 94) (G25 81)     Past Medical History   · History of Abnormal glucose (790 29) (R73 09)   · Atrial fibrillation (427 31) (I48 91)   · History of Breast Cancer (V10 3)   · 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)   · Esophageal reflux (530 81) (K21 9)   · Denied: History of Exposure To STD   · History of Gross hematuria (599 71) (R31 0)   · History of Hematoma (924 9) (T14 8XXA)   · History of Hematoma of lower extremity, right, initial encounter (924 5) (S80 11XA)   · History of backache (V13 59) (Z87 39)   · History of Carrero's esophagus (V12 79) (Z87 19)   · History of cataract (V12 49) (Z86 69)   · History of chest pain (V13 89) (Z87 898)   · History of fall (V15 88) (Z91 81)   · History of hematuria (V13 09) (Z87 448)   · History of postmenopausal hormone replacement therapy (V87 49) (Z92 29)   · History of shortness of breath (V13 89) (T88 832)   · History of urinary incontinence (V13 09) (Z87 898)   · History of Neck pain (723 1) (M54 2)   · Normal delivery (650) (O80,Z37  9)   · History of Right knee pain (719 46) (M25 561)   · History of Ringing In The Ears (Tinnitus) (388 30)   · History of Skin rash (782 1) (R21)   · History of Traumatic blister of right lower extremity, initial encounter (916 2) (Z93 627K)   · History of UTI (lower urinary tract infection) (599 0) (N39 0)     The active problems and past medical history were reviewed and updated today       Surgical History   · Denied: History of Abnormal Pap Smear Of Cervix   · History of Breast Surgery Lumpectomy   · History of Cataract Surgery   · History of Diagnostic Cystoscopy   · History of Excision Lesion Of Meniscus Or Capsule Knee   · History of Pacemaker - Pulse Generator Replacement   · History of Pacemaker Placement   · History of Pacemaker Placement   · History of Radiation Therapy   · History of Total Abdominal Hysterectomy With Removal Of Both Ovaries     The surgical history was reviewed and updated today        Family History  Mother    · Denied: Family history of Alcoholism and drug addiction in family   · Denied: Family history of Anxiety and depression  Father    · Denied: Family history of Alcoholism and drug addiction in family   · Denied: Family history of Anxiety and depression   · Family history of Coronary Artery Disease (V17 49)   · Family history of abdominal aortic aneurysm (V17 49) (Z82 49)  Child    · Denied: Family history of Alcoholism and drug addiction in family   · Denied: Family history of Anxiety and depression  Sibling    · Denied: Family history of Alcoholism and drug addiction in family   · Denied: Family history of Anxiety and depression  Sister    · Family history of Breast Cancer (V16 3)  Maternal Aunt    · Family history of Colon Cancer (V16 0)   · Family history of Colon Cancer (V16 0)  Family History    · Denied: Family history of Diabetes Mellitus   · Denied: Family history of Stroke Syndrome     The family history was reviewed and updated today        Social History      · Being A Social Drinker   · Denied: History of Drug Use   · Former smoker (V15 82) (Z87 891)   · 25 pack years, quit age 41   · Marital History - Currently    · Retired From Work   · 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  The social history was reviewed and updated today       The medication list was reviewed and updated today        Allergies  1  duloxetine   2  LevoFLOXacin TABS   3  Cephalexin CAPS   4  Erythromycin Base TABS   5  Keflex TABS   6  Penicillins   7  Sulfa Drugs        Physical Exam  Left Foot: Appearance: Normal except as noted: excessive pronation-- and-- pes planus  Tenderness: None except the lisfranc joint-- and-- medial longitudinal arch  ROM: subtalar motion was restricted  Right Foot: Appearance: Normal except as noted: excessive pronation-- and-- pes planus  Tenderness: None except the lisfranc joint-- and-- medial longitudinal arch  ROM: subtalar motion was restricted   Left Ankle: ROM: limited ROM in all planes   Right Ankle: ROM: limited ROM in all planes   Neurological Exam: performed  Light touch was decreased bilaterally  Vibratory sensation was decreased in both first metatarsophalangeal joints  Deep tendon reflexes: achilles reflex absent bilateraly-- and-- 4/5 L5 testing bilateral    Vascular Exam: performed Dorsalis pedis pulses were diminished bilaterally  Posterior tibial pulses were diminished bilaterally  Dependence rubor was present bilaterally  Capillary refill time was Negative digital hair noted, but-- between 1-3 seconds bilaterally  Toenails: All of the toenails were elongated,-- hypertrophied,-- discolored-- and--   Hyperkeratosis: present on both first toes  Shoe Gear Evaluation: performed ()  Recommendation(s): SAS style       Patient's shoes and socks removed  Right Foot/Ankle   Right Foot Inspection        Sensory   Vibration: diminished  Proprioception: diminished      Vascular  Capillary refills: elevated        Left Foot/Ankle  Left Foot Inspection                    Jaleesa Lopez  Proprioception: diminished     Vascular  Capillary refills: elevated  Patient's shoes and socks removed  Assign Risk Category:  Deformity present;  Loss of protective sensation; Weak pulses       Risk: 2

## 2021-03-15 ENCOUNTER — APPOINTMENT (OUTPATIENT)
Dept: LAB | Facility: HOSPITAL | Age: 81
End: 2021-03-15
Payer: MEDICARE

## 2021-03-15 ENCOUNTER — ANTICOAG VISIT (OUTPATIENT)
Dept: CARDIOLOGY CLINIC | Facility: CLINIC | Age: 81
End: 2021-03-15

## 2021-03-15 DIAGNOSIS — I48.20 CHRONIC ATRIAL FIBRILLATION (HCC): ICD-10-CM

## 2021-03-23 ENCOUNTER — OFFICE VISIT (OUTPATIENT)
Dept: CARDIOLOGY CLINIC | Facility: CLINIC | Age: 81
End: 2021-03-23
Payer: MEDICARE

## 2021-03-23 VITALS
OXYGEN SATURATION: 96 % | RESPIRATION RATE: 18 BRPM | BODY MASS INDEX: 37.32 KG/M2 | SYSTOLIC BLOOD PRESSURE: 132 MMHG | DIASTOLIC BLOOD PRESSURE: 80 MMHG | WEIGHT: 224 LBS | HEIGHT: 65 IN | HEART RATE: 73 BPM

## 2021-03-23 DIAGNOSIS — I48.11 LONGSTANDING PERSISTENT ATRIAL FIBRILLATION (HCC): Primary | ICD-10-CM

## 2021-03-23 PROCEDURE — 93000 ELECTROCARDIOGRAM COMPLETE: CPT | Performed by: INTERNAL MEDICINE

## 2021-03-23 PROCEDURE — 99214 OFFICE O/P EST MOD 30 MIN: CPT | Performed by: INTERNAL MEDICINE

## 2021-03-23 NOTE — PROGRESS NOTES
Cardiology Follow Up    Yessy Speaker Darryl Kumar  1940  [id number]   [facility]  [address]  [address]  [place] [id number] [phone]    1  Longstanding persistent atrial fibrillation (HCC)  POCT ECG       Interval History: Followup for persistent atrial fibrillation     Problem List     Hiatal hernia    Atrial fibrillation ([facility] ) [I48 [id number]]    Overview Signed 3/10/2018  8:59 AM by Ben Adams MD     Description: s/p 2 unsuccessful ablation, s/p 2 cardioversion last in 2010, s/p pacemaker  ; on anticoagulation followed by cardiology         Acquired deformity of foot    Corns    Diabetic polyneuropathy associated with type 2 diabetes mellitus ([facility] )    Overview Signed 3/12/2018 12:21 PM by Ben Adams MD     ?duloxetine           Lab Results   Component Value Date    HGBA1C 5 5 12/08/2020         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[facility] )    Radiculopathy of lumbar region    Primary osteoarthritis of both knees    Overview Signed 6/22/2020 10:15 AM by Ben Adams MD     S/p injections         Sensory polyneuropathy    RLS (restless legs syndrome)    Arthritis    Essential hypertension    Overview Signed 12/22/2020 12:51 PM by Ben Adams MD     lisinopril         Multiple lung nodules    Overview Addendum 6/22/2020 10:16 AM by Ben Adams MD     Stable, no further CT  Last CT 2016         Severe obesity (BMI 35 0-39  9) with comorbidity ([facility] )    Osteopenia of multiple sites    Peripheral neuropathy    Overview Signed 3/10/2018  8:59 AM by Ben Adams MD     Transitioned From: Neuropathy         Vitamin D deficiency    Dense breast tissue on mammogram    Difficulty walking    Flat foot    Onychomycosis    Carrero's esophagus with dysplasia    Mild cognitive impairment    Pacemaker    GERD (gastroesophageal reflux disease)    Prediabetes    Overview Signed 3/7/2019 10:31 AM by Allie Beverly Per CMS ICD 10 Guidelines         Pain in both feet    Chronic edema    Deep venous insufficiency    Colon polyps    Lichen sclerosus et atrophicus of the vulva    Status post total knee replacement using cement, left    Leg swelling        Past Medical History:   Diagnosis Date    At[facility] fibrillation ([facility] )     Carrero's esophagus     last assessed: 2018    BRCA1 negative     BRCA2 negative     Breast cancer ([facility] )     stage 1 (left), given adjuvant radiation with Arimidex x 5 years    Cancer [facility])     Left Breast, Lumpectomy    Cataract     last assessed: 3/11/2014    Dysplasia of toenail     last assessed: 2017    Esophageal reflux     GERD (gastroesophageal reflux disease)     Gross hematuria     last assessed: 2015    Hematuria     Hiatal hernia     History of radiation therapy     Hypertension     Irregular heart beat     AFIB    Mixed sensory-motor polyneuropathy     Neuropathy     Obesity     Pacemaker     Paroxysmal atrial fibrillation (HCC)     Peripheral neuropathy     Rectal bleeding     Restless leg syndrome     Shortness of breath     last assessed: 2016     Social History     Socioeconomic History    Marital status: /Civil Union     Spouse name: Not on file    Number of children: Not on file    Years of education: Not on file    Highest education level: Not on file   Occupational History    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      Comment: retired   Social Needs    Financial resource strain: Not on file    Food insecurity     Worry: Not on file     Inability: Not on file   [employer] needs     Medical: Not on file     Non-medical: Not on file   Tobacco Use    Smoking status: Former Smoker     Packs/day: 1 00     Years: 25 00     Pack years: 25 00     Types: Cigarettes     Quit date: 1980     Years since quittin 5    Smokeless tobacco: Never Used    Tobacco comment: Quit over 30 years ago; quit age 41   Substance and Sexual Activity    Alcohol use: Not Currently    Drug use: No    Sexual activity: Not on file   Lifestyle    Physical activity     Days per week: Not on file     Minutes per session: Not on file    Stress: Not on file   Relationships    Social connections     Talks on phone: Not on file     Gets together: Not on file     Attends Restorationism service: Not on file     Active member of club or organization: Not on file     Attends meetings of clubs or organizations: Not on file     Relationship status: Not on file    Intimate partner violence     Fear of current or ex partner: Not on file     Emotionally abused: Not on file     Physically abused: Not on file     Forced sexual activity: Not on file   Other Topics Concern    Not on file   Social History Narrative          Family History   Problem Relation Age of Onset    Hypertension Mother     Heart disease Father     Aneurysm Father     Coronary artery disease Father         in his 76s with aneurysm    Aortic aneurysm Father         abdominal    Scleroderma Sister     Breast cancer Sister 76    Hypertension Sister     Cancer Sister     No Known Problems Son     No Known Problems Son     Testicular cancer Son 39    Thyroid cancer Son 45    Colon cancer Maternal Aunt     Colon cancer Maternal Aunt     Breast cancer Other 48        kaylee's daughter    Alcohol abuse Neg Hx     Substance Abuse Neg Hx     Mental illness Neg Hx     Depression Neg Hx      Past Surgical History:   Procedure Laterality Date    BREAST BIOPSY Left 2006    BREAST LUMPECTOMY Left 2006    onset: 2006    BREAST SURGERY      CARDIAC PACEMAKER PLACEMENT      x 3 2006    CATARACT EXTRACTION      COLONOSCOPY      CYSTOSCOPY  04/04/2014    diagnostic    HYSTERECTOMY      ALISON BSO; due to fibroid uterus; age 38   Kadie Slipper KNEE CARTILAGE SURGERY      excision lesion of meniscus or capsule knee    KNEE SURGERY      OOPHORECTOMY Bilateral     OTHER SURGICAL HISTORY      radiation therapy    SC COLONOSCOPY FLX DX W/COLLJ SPEC WHEN PFRMD N/A 2/8/2017    Procedure: COLONOSCOPY;  Surgeon: Kenny Lux MD;  Location:  GI LAB; Service: Gastroenterology    SC ESOPHAGOGASTRODUODENOSCOPY TRANSORAL DIAGNOSTIC N/A 9/20/2017    Procedure: ESOPHAGOGASTRODUODENOSCOPY (EGD); Surgeon: Naomi Mcdermott MD;  Location:  GI LAB; Service: Gastroenterology    SC TOTAL KNEE ARTHROPLASTY Left 8/17/2020    Procedure: ARTHROPLASTY KNEE TOTAL;  Surgeon: Ania Christensen DO;  Location: [address] [place] [address];  Service: Orthopedics    UPPER GASTROINTESTINAL ENDOSCOPY         Current Outpatient Medications:     acetaminophen (TYLENOL) 325 mg tablet, Take 3 tablets (975 mg total) by mouth every 8 (eight) hours (Patient taking differently: Take 975 mg by mouth every 8 (eight) hours PRN), Disp: 30 tablet, Rfl: 0    Calcium Acetate, Phos Binder, (CALCIUM ACETATE PO), Take by mouth daily  , Disp: , Rfl:     clobetasol (TEMOVATE) 0 05 % ointment, Apply once weekly to the affected area, Disp: 30 g, Rfl: 3    famotidine (PEPCID) 40 MG tablet, TAKE 1 TABLET BY MOUTH EVERY DAY, Disp: 90 tablet, Rfl: 1    furosemide (LASIX) 40 mg tablet, TAKE 1 TABLET BY MOUTH EVERY DAY (Patient taking differently: Patient takes 20mg a day at the present time), Disp: 90 tablet, Rfl: 3    gabapentin (NEURONTIN) 800 mg tablet, Take 1 tablet (800 mg total) by mouth 4 (four) times a day, Disp: 360 tablet, Rfl: 1    loratadine (CLARITIN) 10 mg tablet, Take 10 mg by mouth daily, Disp: , Rfl:     metoprolol tartrate (LOPRESSOR) 50 mg tablet, TAKE 1 5 TABLETS (75 MG TOTAL) BY MOUTH 2 (TWO) TIMES A DAY (Patient taking differently: Patient takes 1 tab bid), Disp: 270 tablet, Rfl: 2    multivitamin (THERAGRAN) TABS, Take 1 tablet by mouth daily, Disp: , Rfl:     pramipexole (MIRAPEX) 0 5 mg tablet, 2 FULL TABLETS AT 5:00 P  M   AND 10:00 P M , Disp: 120 tablet, Rfl: 2    warfarin (COUMADIN) 7 5 mg tablet, Take 1 tablet (7 5 mg total) by mouth daily, Disp: 90 tablet, Rfl: 3    acetaminophen (TYLENOL) 650 mg CR tablet, Take 1,300 mg by mouth daily, Disp: , Rfl:     clindamycin (CLEOCIN) 300 MG capsule, Take 2 capsules (600 mg total) by mouth 60 minutes pre-procedure (Patient not taking: Reported on 3/23/2021), Disp: 2 capsule, Rfl: 1    docusate sodium (COLACE) 100 mg capsule, Take 1 capsule (100 mg total) by mouth 2 (two) times a day (Patient not taking: Reported on 1/4/2021), Disp: 60 capsule, Rfl: 0  Allergies   Allergen Reactions    Cephalexin Rash    Duloxetine Hcl Other (See Comments)     Facial pins and needles sensation    Erythromycin Rash    Levofloxacin Other (See Comments)     Muscular aches    Penicillins Rash    Savella [Milnacipran] Rash    Sulfa Antibiotics Rash       Labs:     Chemistry        Component Value Date/Time     11/09/2015 1115    K 4 3 12/08/2020 0928    K 4 5 11/09/2015 1115     12/08/2020 0928     11/09/2015 1115    CO2 33 (H) 12/08/2020 0928    CO2 30 (H) 11/09/2015 1115    BUN 24 12/08/2020 0928    BUN 20 11/09/2015 1115    CREATININE 1 05 12/08/2020 0928    CREATININE 0 8 11/09/2015 1115        Component Value Date/Time    CALCIUM 9 4 12/08/2020 0928    CALCIUM 9 0 11/09/2015 1115    ALKPHOS 82 12/08/2020 0928    ALKPHOS 61 11/09/2015 1115    AST 29 12/08/2020 0928    AST 23 11/09/2015 1115    ALT 35 12/08/2020 0928    ALT 31 11/09/2015 1115    BILITOT 0 5 11/09/2015 1115            Lab Results   Component Value Date    CHOL 162 11/09/2015     Lab Results   Component Value Date    HDL 40 12/08/2020    HDL 35 (L) 11/14/2019    HDL 41 03/05/2019     Lab Results   Component Value Date    LDLCALC 61 12/08/2020    LDLCALC 80 11/14/2019    LDLCALC 105 (H) 03/05/2019     Lab Results   Component Value Date    TRIG 69 12/08/2020    TRIG 127 11/14/2019    TRIG 119 03/05/2019     No results found for: CHOLHDL    Imaging: No results found      EKG: Atrial Fibrillation     Review of Systems Constitution: Positive for malaise/fatigue  HENT: Negative  Eyes: Negative  Cardiovascular: Negative  Respiratory: Positive for shortness of breath  Hematologic/Lymphatic: Negative  Skin: Negative  Musculoskeletal: Negative  Gastrointestinal: Negative  Genitourinary: Negative  Neurological: Negative  Psychiatric/Behavioral: Negative  Allergic/Immunologic: Negative  Vitals:    03/23/21 1329   BP: 132/80   Pulse: 73   Resp: 18   SpO2: 96%           Physical Exam  Vitals signs and nursing note reviewed  Constitutional:       Appearance: Normal appearance  HENT:      Head: Normocephalic  Nose: Nose normal       Mouth/Throat:      Mouth: Mucous membranes are moist    Eyes:      General: No scleral icterus  Conjunctiva/sclera: Conjunctivae normal    Neck:      Musculoskeletal: Normal range of motion and neck supple  Cardiovascular:      Rate and Rhythm: Normal rate  Rhythm irregular  Heart sounds: No murmur  No gallop  Pulmonary:      Effort: Pulmonary effort is normal  No respiratory distress  Breath sounds: Normal breath sounds  No wheezing or rales  Abdominal:      General: Abdomen is flat  Bowel sounds are normal  There is no distension  Palpations: Abdomen is soft  Tenderness: There is no abdominal tenderness  There is no guarding  Musculoskeletal:      Right lower leg: No edema  Left lower leg: No edema  Skin:     General: Skin is warm and dry  Neurological:      General: No focal deficit present  Mental Status: She is alert and oriented to person, place, and time  Psychiatric:         Mood and Affect: Mood normal          Behavior: Behavior normal          Discussion/Summary:  1  Chronic Atrial Fibrillation: Overall doing well  Continue Metoprolol  Stable on Coumadin      2  Hypertension: Her BP is well controlled   Continue current medical therapy   lisinopril was discontinued       3  s/p PPM: Continue device checks  Device checks were reviewed        4  Mild Aortic Stenosis: Will continue to follow  Update echo           The patient was counseled regarding diagnostic results, instructions for management, risk factor reductions, impressions  total time of encounter was 25 minutes and 15 minutes was spent counseling

## 2021-04-16 ENCOUNTER — LAB (OUTPATIENT)
Dept: LAB | Facility: HOSPITAL | Age: 81
End: 2021-04-16
Payer: MEDICARE

## 2021-04-16 ENCOUNTER — ANTICOAG VISIT (OUTPATIENT)
Dept: CARDIOLOGY CLINIC | Facility: CLINIC | Age: 81
End: 2021-04-16

## 2021-04-16 ENCOUNTER — TRANSCRIBE ORDERS (OUTPATIENT)
Dept: ADMINISTRATIVE | Facility: HOSPITAL | Age: 81
End: 2021-04-16

## 2021-04-16 DIAGNOSIS — I48.11 LONGSTANDING PERSISTENT ATRIAL FIBRILLATION (HCC): ICD-10-CM

## 2021-04-20 ENCOUNTER — TELEPHONE (OUTPATIENT)
Dept: INTERNAL MEDICINE CLINIC | Facility: CLINIC | Age: 81
End: 2021-04-20

## 2021-04-20 DIAGNOSIS — R06.02 SHORTNESS OF BREATH: Primary | ICD-10-CM

## 2021-04-20 NOTE — TELEPHONE ENCOUNTER
Her upper stomach hurts when she lays on her back at night  It is interfering with her sleep and breathing  It hurts to put her hand on her stomach  She can't specify how long its been bothering her  She only says its been going on for a while  She does have a snack about an hour before bed time and her symptoms do not change depending on what she eats  She could just eat one grape and still has pain  She has been taking Pepcid  I offered her an appointment for today that she didn't want  She did make an appointment for 6/3  Also did not want an appointment with Sandro Ashley sooner  She said she has had an endoscopy with the last year or two

## 2021-04-20 NOTE — TELEPHONE ENCOUNTER
Pt states no vomiting , no chest pain , but it does give her some slight difficulty breathing   Asked if it was OK to send the Omeprazole and she stated to send the message to you first and go from there

## 2021-04-20 NOTE — TELEPHONE ENCOUNTER
Any vomiting? Chest pain or difficulty breathing? Recommend not to eat anything 2 hours before bedtime  Do not lay down after meals  We can change the Pepcid to something stronger like omeprazole, can send prescription

## 2021-04-20 NOTE — TELEPHONE ENCOUNTER
You can try taking the famotidine twice a day  Do not eat and lay down, as previous instruction  I can also order a chest x-ray since with breathing symptoms

## 2021-04-21 ENCOUNTER — HOSPITAL ENCOUNTER (OUTPATIENT)
Dept: RADIOLOGY | Facility: HOSPITAL | Age: 81
Discharge: HOME | End: 2021-04-21
Payer: MEDICARE

## 2021-04-21 DIAGNOSIS — R06.02 SHORTNESS OF BREATH: ICD-10-CM

## 2021-04-21 PROCEDURE — 71046 X-RAY EXAM CHEST 2 VIEWS: CPT

## 2021-04-26 ENCOUNTER — TELEPHONE (OUTPATIENT)
Dept: INTERNAL MEDICINE CLINIC | Facility: CLINIC | Age: 81
End: 2021-04-26

## 2021-04-26 NOTE — TELEPHONE ENCOUNTER
Chest x-ray showed an enlarged heart, otherwise, no new findings  Please keep your scheduled appointment for the Echo next week  Recommend to avoid eating late at night, do not lay down after meals until at least 2 hours later  Continue famotidine twice a day

## 2021-04-27 DIAGNOSIS — K21.9 GASTROESOPHAGEAL REFLUX DISEASE: ICD-10-CM

## 2021-04-27 RX ORDER — FAMOTIDINE 40 MG/1
TABLET, FILM COATED ORAL
Qty: 90 TABLET | Refills: 1 | Status: SHIPPED | OUTPATIENT
Start: 2021-04-27 | End: 2021-07-06

## 2021-04-29 ENCOUNTER — REMOTE DEVICE CLINIC VISIT (OUTPATIENT)
Dept: CARDIOLOGY CLINIC | Facility: CLINIC | Age: 81
End: 2021-04-29
Payer: MEDICARE

## 2021-04-29 DIAGNOSIS — Z95.0 CARDIAC PACEMAKER IN SITU: Primary | ICD-10-CM

## 2021-04-29 PROCEDURE — 93294 REM INTERROG EVL PM/LDLS PM: CPT | Performed by: INTERNAL MEDICINE

## 2021-04-29 PROCEDURE — 93296 REM INTERROG EVL PM/IDS: CPT | Performed by: INTERNAL MEDICINE

## 2021-04-29 NOTE — PROGRESS NOTES
Results for orders placed or performed in visit on 04/29/21   Cardiac EP device report    Narrative    MDT-SINGLE-PM/ NOT MRI CONDITIONAL  CARELINK TRANSMISSION: BATTERY VOLTAGE ADEQUATE (2 YRS)  -23%  ALL AVAILABLE LEAD PARAMETERS WITHIN NORMAL LIMITS  [phone] [facility] EPISODES SINCE 10/15/20 MAX DURATION 4 5 MINS- RVR ON AVAILABLE EGM'S BUT CANNOT EXCLUDE ANY NSVT  HX OF SAME  EF-60% (ECHO 11/28/18; ECHO SCHEDULED 5/4/21)  HX: CHRONIC AF & ON WARFARIN & METOPROLOL  NORMAL DEVICE FUNCTION   GV

## 2021-05-04 ENCOUNTER — HOSPITAL ENCOUNTER (OUTPATIENT)
Dept: NON INVASIVE DIAGNOSTICS | Facility: HOSPITAL | Age: 81
Discharge: HOME | End: 2021-05-04
Payer: MEDICARE

## 2021-05-04 DIAGNOSIS — I48.11 LONGSTANDING PERSISTENT ATRIAL FIBRILLATION (HCC): ICD-10-CM

## 2021-05-04 PROCEDURE — 93306 TTE W/DOPPLER COMPLETE: CPT

## 2021-05-04 PROCEDURE — 93306 TTE W/DOPPLER COMPLETE: CPT | Performed by: INTERNAL MEDICINE

## 2021-05-13 ENCOUNTER — OFFICE VISIT (OUTPATIENT)
Dept: GYNECOLOGIC ONCOLOGY | Facility: CLINIC | Age: 81
End: 2021-05-13
Payer: MEDICARE

## 2021-05-13 ENCOUNTER — TELEPHONE (OUTPATIENT)
Dept: SURGICAL ONCOLOGY | Facility: CLINIC | Age: 81
End: 2021-05-13

## 2021-05-13 VITALS
SYSTOLIC BLOOD PRESSURE: 130 MMHG | HEART RATE: 88 BPM | HEIGHT: 65 IN | WEIGHT: 224 LBS | BODY MASS INDEX: 37.32 KG/M2 | DIASTOLIC BLOOD PRESSURE: 80 MMHG

## 2021-05-13 DIAGNOSIS — N90.4 LICHEN SCLEROSUS ET ATROPHICUS OF THE VULVA: Primary | ICD-10-CM

## 2021-05-13 PROCEDURE — 99212 OFFICE O/P EST SF 10 MIN: CPT | Performed by: OBSTETRICS & GYNECOLOGY

## 2021-05-13 NOTE — ASSESSMENT & PLAN NOTE
44-year-old with biopsy-proven lichen sclerosis of the vulva  Her last biopsy was in May of 2020  She has been using weekly clobetasol with resolution of symptoms  Clinically, there has been significant improvement  There is no evidence of dysplasia or malignancy  Her gynecologic examination is benign  Her performance status is 1     1  She will continue using weekly clobetasol ointment to treat lichen sclerosus of the vulva  2  Return in 1 year for evaluation

## 2021-05-13 NOTE — PROGRESS NOTES
Assessment/Plan:    Problem List Items Addressed This Visit        Genitourinary    Lichen sclerosus et atrophicus of the vulva - Primary     68-year-old with biopsy-proven lichen sclerosis of the vulva  Her last biopsy was in May of 2020  She has been using weekly clobetasol with resolution of symptoms  Clinically, there has been significant improvement  There is no evidence of dysplasia or malignancy  Her gynecologic examination is benign  Her performance status is 1     1  She will continue using weekly clobetasol ointment to treat lichen sclerosus of the vulva  2  Return in 1 year for evaluation  CHIEF COMPLAINT:  Follow-up for lichen sclerosus of the vulva        Patient ID: Oswaldo Zapata is a 68 y o  female  68-year-old presents for follow-up of lichen sclerosus of the vulva  She has been using clobetasol weekly with excellent resolution of her symptoms  Her last biopsy in May of 2020 was also consistent with lichen sclerosis  She follows with Cardiology due to atrial fibrillation the requires therapeutic anticoagulation  Most recent INR as of 4/16/2021 is 2 37  She has a pacemaker  No other significant interval change in her medications or medical history since her last visit  The following portions of the patient's history were reviewed and updated as appropriate: allergies, current medications, past family history, past medical history, past social history, past surgical history and problem list     Review of Systems   Constitutional: Negative for activity change and unexpected weight change  HENT: Negative  Eyes: Negative  Respiratory: Negative  Cardiovascular: Negative  Gastrointestinal: Negative for abdominal distention and abdominal pain  Endocrine: Negative  Genitourinary: Negative for pelvic pain and vaginal bleeding  Musculoskeletal: Negative  Skin: Negative  Allergic/Immunologic: Negative  Neurological: Negative      Hematological: Negative  Psychiatric/Behavioral: Negative  Current Outpatient Medications   Medication Sig Dispense Refill    acetaminophen (TYLENOL) 325 mg tablet Take 3 tablets (975 mg total) by mouth every 8 (eight) hours (Patient taking differently: Take 975 mg by mouth every 8 (eight) hours PRN) 30 tablet 0    acetaminophen (TYLENOL) 650 mg CR tablet Take 1,300 mg by mouth daily      Calcium Acetate, Phos Binder, (CALCIUM ACETATE PO) Take by mouth daily        clindamycin (CLEOCIN) 300 MG capsule Take 2 capsules (600 mg total) by mouth 60 minutes pre-procedure (Patient not taking: Reported on 3/23/2021) 2 capsule 1    clobetasol (TEMOVATE) 0 05 % ointment Apply once weekly to the affected area 30 g 3    docusate sodium (COLACE) 100 mg capsule Take 1 capsule (100 mg total) by mouth 2 (two) times a day (Patient not taking: Reported on 1/4/2021) 60 capsule 0    famotidine (PEPCID) 40 MG tablet TAKE 1 TABLET BY MOUTH EVERY DAY 90 tablet 1    furosemide (LASIX) 40 mg tablet TAKE 1 TABLET BY MOUTH EVERY DAY (Patient taking differently: Patient takes 20mg a day at the present time) 90 tablet 3    gabapentin (NEURONTIN) 800 mg tablet Take 1 tablet (800 mg total) by mouth 4 (four) times a day 360 tablet 1    loratadine (CLARITIN) 10 mg tablet Take 10 mg by mouth daily      metoprolol tartrate (LOPRESSOR) 50 mg tablet TAKE 1 5 TABLETS (75 MG TOTAL) BY MOUTH 2 (TWO) TIMES A DAY (Patient taking differently: Patient takes 1 tab bid) 270 tablet 2    multivitamin (THERAGRAN) TABS Take 1 tablet by mouth daily      pramipexole (MIRAPEX) 0 5 mg tablet 2 FULL TABLETS AT 5:00 P  M  AND 10:00 P M  120 tablet 2    warfarin (COUMADIN) 7 5 mg tablet Take 1 tablet (7 5 mg total) by mouth daily 90 tablet 3     No current facility-administered medications for this visit  Objective:    Blood pressure 130/80, pulse 88, height 5' 4 5" (1 638 m), weight 102 kg (224 lb)  Body mass index is 37 86 kg/m²    Body surface area is 2 07 meters squared  Physical Exam  Constitutional:       General: She is not in acute distress  Appearance: Normal appearance  She is well-developed  She is obese  She is not ill-appearing, toxic-appearing or diaphoretic  HENT:      Head: Normocephalic and atraumatic  Eyes:      General: No scleral icterus  Right eye: No discharge  Left eye: No discharge  Extraocular Movements: Extraocular movements intact  Conjunctiva/sclera: Conjunctivae normal    Neck:      Musculoskeletal: Normal range of motion and neck supple  No neck rigidity  Thyroid: No thyromegaly  Pulmonary:      Effort: Pulmonary effort is normal  No respiratory distress  Breath sounds: No stridor  No wheezing  Abdominal:      General: There is no distension  Palpations: Abdomen is soft  There is no mass  Tenderness: There is no abdominal tenderness  There is no guarding or rebound  Genitourinary:     Comments: The external female genitalia demonstrate some loss of labia minora  There is no evidence of excoriation or hypertrophy  No other visible lesions    The bartholin's, uretheral and skenes glands are normal  The urethral meatus is normal (midline with no lesions)  Anus without fissure or lesion  Speculum exam reveals a grossly normal vagina  No masses, lesions,discharge or bleeding  No significant cystocele or rectocele noted  Bimanual exam notes a surgical absent cervix, uterus and adnexal structures  No masses or fullness  Bladder is without fullness, mass or tenderness  Musculoskeletal:      Right lower leg: No edema  Left lower leg: No edema  Lymphadenopathy:      Cervical: No cervical adenopathy  Skin:     General: Skin is warm and dry  Coloration: Skin is not jaundiced or pale  Findings: No bruising or erythema  Neurological:      General: No focal deficit present  Mental Status: She is alert and oriented to person, place, and time   Mental status is at baseline  Cranial Nerves: No cranial nerve deficit  Motor: No weakness  Gait: Gait normal    Psychiatric:         Mood and Affect: Mood normal          Behavior: Behavior normal          Thought Content:  Thought content normal          Judgment: Judgment normal

## 2021-05-13 NOTE — TELEPHONE ENCOUNTER
Pt called to verify what location her appointment with Dr Helena Adames is  I verified that her appointment is at the [address] [place] [address] on the second floor  All pt's questions were answered

## 2021-05-17 ENCOUNTER — OFFICE VISIT (OUTPATIENT)
Dept: PODIATRY | Facility: CLINIC | Age: 81
End: 2021-05-17
Payer: MEDICARE

## 2021-05-17 ENCOUNTER — ANTICOAG VISIT (OUTPATIENT)
Dept: CARDIOLOGY CLINIC | Facility: CLINIC | Age: 81
End: 2021-05-17

## 2021-05-17 ENCOUNTER — APPOINTMENT (OUTPATIENT)
Dept: LAB | Facility: CLINIC | Age: 81
End: 2021-05-17
Payer: MEDICARE

## 2021-05-17 VITALS
SYSTOLIC BLOOD PRESSURE: 130 MMHG | RESPIRATION RATE: 17 BRPM | BODY MASS INDEX: 37.32 KG/M2 | DIASTOLIC BLOOD PRESSURE: 80 MMHG | HEIGHT: 65 IN | WEIGHT: 224 LBS

## 2021-05-17 DIAGNOSIS — I70.209 PERIPHERAL ARTERIOSCLEROSIS (HCC): ICD-10-CM

## 2021-05-17 DIAGNOSIS — E11.51 TYPE 2 DIABETES MELLITUS WITH DIABETIC PERIPHERAL ANGIOPATHY WITHOUT GANGRENE, WITHOUT LONG-TERM CURRENT USE OF INSULIN (HCC): Primary | ICD-10-CM

## 2021-05-17 DIAGNOSIS — M79.672 PAIN IN BOTH FEET: ICD-10-CM

## 2021-05-17 DIAGNOSIS — L84 CORNS: ICD-10-CM

## 2021-05-17 DIAGNOSIS — I48.11 LONGSTANDING PERSISTENT ATRIAL FIBRILLATION (HCC): ICD-10-CM

## 2021-05-17 DIAGNOSIS — M79.671 PAIN IN BOTH FEET: ICD-10-CM

## 2021-05-17 PROCEDURE — 11056 PARNG/CUTG B9 HYPRKR LES 2-4: CPT | Performed by: PODIATRIST

## 2021-05-17 NOTE — PROGRESS NOTES
Assessment/Plan:  Painful calluses   Diabetic neuropathy   Peripheral vascular disease   Chronic edema  Chronic plantar fasciitis left foot         Plan   Diabetic foot exam performed   All mycotic nails debrided   Plantar calluses debrided without pain or complication  Procedures performed without pain or complication         Subjective   Patient is diabetic   She has pain with walking   She has pain with shoe wear   No history of trauma     Diabetic polyneuropathy associated with type 2 diabetes mellitus (HCC)     Corns     Pain in both feet     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HCC)     Chronic edema   Rule out deep venous insufficiency       Discussion/Summary  The patient was counseled regarding instructions for management,-- prognosis,-- patient and family education,-- risks and benefits of treatment options,-- importance of compliance with treatment  Patient is able to Self-Care  Possible side effects of new medications were reviewed with the patient/guardian today  The treatment plan was reviewed with the patient/guardian  The patient/guardian understands and agrees with the treatment plan      Chief Complaint  Patient has complaint of pain in her toes with ambulation   No history of trauma     History of Present Illness  HPI: Patient is doing well with Cymbalta however she began have facial tingling  She discontinued medicine   However the medication was relieving her neuropathic pain       Review of Systems     ROS reviewed       Active Problems     1  Achilles tendinitis of left lower extremity (726 71) (M76 62)   2  Acquired ankle/foot deformity (736 70) (M21 969)   3  AF (paroxysmal atrial fibrillation) (427 31) (I48 0)   4  Anticoagulant long-term use (V58 61) (Z79 01)   5  Arthritis (716 90) (M19 90)   6  At risk for bone density loss (V49 89) (Z91 89)   7  Atrial fibrillation (427 31) (I48 91)   8  History of Breast Cancer (V10 3)   9  Difficulty walking (719 7) (R26 2)   10  Encounter for screening mammogram for breast cancer (V76 12) (Z12 31)   11  Esophageal reflux (530 81) (K21 9)   12  Foot pain, bilateral (729 5) (M79 671,M79 672)   13  Hiatal hernia (553 3) (K44 9)   14  History of Carrero's esophagus (V12 79) (Z87 19)   15  History of fall (V15 88) (Z91 81)   16  Hypertension (401 9) (I10)   17  Leg pain, left (729 5) (N69 354)   18  Lichen sclerosus et atrophicus (701 0) (L90 0)   19  Lumbar radiculopathy (724 4) (M54 16)   20  Multiple lung nodules (793 19) (R91 8)   21  Obesity (278 00) (E66 9)   22  Onychomycosis (110 1) (B35 1)   23  Osteopenia (733 90) (M85 80)   24  Pacemaker (V45 01) (Z95 0)   25  Peripheral neuropathy (356 9) (G62 9)   26  Pes planus of both feet (734) (M21 41,M21 42)   27  Plantar fasciitis of left foot (728 71) (M72 2)   28  Restless legs syndrome (333 94) (G25 81)     Past Medical History   · History of Abnormal glucose (790 29) (R73 09)   · Atrial fibrillation (427 31) (I48 91)   · History of Breast Cancer (V10 3)   · 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)   · Esophageal reflux (530 81) (K21 9)   · Denied: History of Exposure To STD   · History of Gross hematuria (599 71) (R31 0)   · History of Hematoma (924 9) (T14 8XXA)   · History of Hematoma of lower extremity, right, initial encounter (924 5) (S80 11XA)   · History of backache (V13 59) (Z87 39)   · History of Carrero's esophagus (V12 79) (Z87 19)   · History of cataract (V12 49) (Z86 69)   · History of chest pain (V13 89) (Z87 898)   · History of fall (V15 88) (Z91 81)   · History of hematuria (V13 09) (Z87 448)   · History of postmenopausal hormone replacement therapy (V87 49) (Z92 29)   · History of shortness of breath (V13 89) (C77 283)   · History of urinary incontinence (V13 09) (Z87 898)   · History of Neck pain (723 1) (M54 2)   · Normal delivery (650) (O80,Z37  9)   · History of Right knee pain (719 46) (M25 561)   · History of Ringing In The Ears (Tinnitus) (388 30)   · History of Skin rash (782 1) (R21)   · History of Traumatic blister of right lower extremity, initial encounter (916 2) (S19 851U)   · History of UTI (lower urinary tract infection) (599 0) (N39 0)     The active problems and past medical history were reviewed and updated today       Surgical History   · Denied: History of Abnormal Pap Smear Of Cervix   · History of Breast Surgery Lumpectomy   · History of Cataract Surgery   · History of Diagnostic Cystoscopy   · History of Excision Lesion Of Meniscus Or Capsule Knee   · History of Pacemaker - Pulse Generator Replacement   · History of Pacemaker Placement   · History of Pacemaker Placement   · History of Radiation Therapy   · History of Total Abdominal Hysterectomy With Removal Of Both Ovaries     The surgical history was reviewed and updated today        Family History  Mother    · Denied: Family history of Alcoholism and drug addiction in family   · Denied: Family history of Anxiety and depression  Father    · Denied: Family history of Alcoholism and drug addiction in family   · Denied: Family history of Anxiety and depression   · Family history of Coronary Artery Disease (V17 49)   · Family history of abdominal aortic aneurysm (V17 49) (Z82 49)  Child    · Denied: Family history of Alcoholism and drug addiction in family   · Denied: Family history of Anxiety and depression  Sibling    · Denied: Family history of Alcoholism and drug addiction in family   · Denied: Family history of Anxiety and depression  Sister    · Family history of Breast Cancer (V16 3)  Maternal Aunt    · Family history of Colon Cancer (V16 0)   · Family history of Colon Cancer (V16 0)  Family History    · Denied: Family history of Diabetes Mellitus   · Denied: Family history of Stroke Syndrome     The family history was reviewed and updated today        Social History      · Being A Social Drinker   · Denied: History of Drug Use   · Former smoker (V15 82) (N54 743)   · 25 pack years, quit age 41   · Marital History - Currently    · Retired From Work   · 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  The social history was reviewed and updated today       The medication list was reviewed and updated today        Allergies  1  duloxetine   2  LevoFLOXacin TABS   3  Cephalexin CAPS   4  Erythromycin Base TABS   5  Keflex TABS   6  Penicillins   7  Sulfa Drugs        Physical Exam  Left Foot: Appearance: Normal except as noted: excessive pronation-- and-- pes planus  Tenderness: None except the lisfranc joint-- and-- medial longitudinal arch  ROM: subtalar motion was restricted  Right Foot: Appearance: Normal except as noted: excessive pronation-- and-- pes planus  Tenderness: None except the lisfranc joint-- and-- medial longitudinal arch  ROM: subtalar motion was restricted   Left Ankle: ROM: limited ROM in all planes   Right Ankle: ROM: limited ROM in all planes   Neurological Exam: performed  Light touch was decreased bilaterally  Vibratory sensation was decreased in both first metatarsophalangeal joints  Deep tendon reflexes: achilles reflex absent bilateraly-- and-- 4/5 L5 testing bilateral    Vascular Exam: performed Dorsalis pedis pulses were diminished bilaterally  Posterior tibial pulses were diminished bilaterally  Dependence rubor was present bilaterally  Capillary refill time was Negative digital hair noted, but-- between 1-3 seconds bilaterally  Toenails: All of the toenails were elongated,-- hypertrophied,-- discolored-- and--   Hyperkeratosis: present on both first toes  Shoe Gear Evaluation: performed ()  Recommendation(s): SAS style       Patient's shoes and socks removed  Right Foot/Ankle   Right Foot Inspection        Sensory   Vibration: diminished  Proprioception: diminished      Vascular  Capillary refills: elevated        Left Foot/Ankle  Left Foot Inspection                    Sensory   Vibration: diminished  Proprioception: diminished     Vascular  Capillary refills: elevated      Patient's shoes and socks removed  Assign Risk Category:  Deformity present;  Loss of protective sensation; Weak pulses       Risk: 2

## 2021-05-27 LAB
LEFT EYE DIABETIC RETINOPATHY: NORMAL
RIGHT EYE DIABETIC RETINOPATHY: NORMAL

## 2021-05-28 DIAGNOSIS — G25.81 RLS (RESTLESS LEGS SYNDROME): ICD-10-CM

## 2021-05-28 RX ORDER — PRAMIPEXOLE DIHYDROCHLORIDE 0.5 MG/1
TABLET ORAL
Qty: 360 TABLET | Refills: 1 | Status: SHIPPED | OUTPATIENT
Start: 2021-05-28 | End: 2021-08-02

## 2021-06-01 ENCOUNTER — TELEPHONE (OUTPATIENT)
Dept: OBGYN CLINIC | Facility: HOSPITAL | Age: 81
End: 2021-06-01

## 2021-06-01 NOTE — TELEPHONE ENCOUNTER
Patient had a total knee replacement in August of 2020 and fell yesterday  Patient states she does not feel like she hurt anything, but wanted Dr Checo Dickerson to know, just in case he might want to see her  Patient decided that she did want to schedule a visit and noted that her knees gave out which caused her to fall    Scheduled 06-02-21

## 2021-06-02 ENCOUNTER — APPOINTMENT (OUTPATIENT)
Dept: RADIOLOGY | Facility: CLINIC | Age: 81
End: 2021-06-02
Payer: MEDICARE

## 2021-06-02 ENCOUNTER — OFFICE VISIT (OUTPATIENT)
Dept: OBGYN CLINIC | Facility: CLINIC | Age: 81
End: 2021-06-02
Payer: MEDICARE

## 2021-06-02 VITALS
WEIGHT: 221 LBS | HEIGHT: 65 IN | BODY MASS INDEX: 36.82 KG/M2 | HEART RATE: 84 BPM | DIASTOLIC BLOOD PRESSURE: 84 MMHG | SYSTOLIC BLOOD PRESSURE: 122 MMHG

## 2021-06-02 DIAGNOSIS — Z96.652 STATUS POST LEFT KNEE REPLACEMENT: Primary | ICD-10-CM

## 2021-06-02 DIAGNOSIS — Z96.652 AFTERCARE FOLLOWING LEFT KNEE JOINT REPLACEMENT SURGERY: ICD-10-CM

## 2021-06-02 DIAGNOSIS — M17.11 PRIMARY OSTEOARTHRITIS OF RIGHT KNEE: ICD-10-CM

## 2021-06-02 DIAGNOSIS — M17.0 PRIMARY OSTEOARTHRITIS OF BOTH KNEES: ICD-10-CM

## 2021-06-02 DIAGNOSIS — Z47.1 AFTERCARE FOLLOWING LEFT KNEE JOINT REPLACEMENT SURGERY: ICD-10-CM

## 2021-06-02 PROCEDURE — 73562 X-RAY EXAM OF KNEE 3: CPT

## 2021-06-02 PROCEDURE — 99213 OFFICE O/P EST LOW 20 MIN: CPT | Performed by: ORTHOPAEDIC SURGERY

## 2021-06-02 NOTE — PROGRESS NOTES
Assessment/Plan:  1  Status post left knee replacement  XR knee 3 vw left non injury    XR knee 3 vw right non injury   2  Primary osteoarthritis of right knee  XR knee 3 vw left non injury    XR knee 3 vw right non injury     Turner Lopez is a pleasant 68-year-old female presenting today for follow-up of her bilateral knees after a fall 2 days ago  We reassured her that she did not damage the left total knee prosthesis and did not sustain any fractures or other acute complications in either knee  The prosthesis is still stable on imaging and exam   Her right knee does have severe end-stage underlying osteoarthritis, but pain has returned to baseline  Therefore, no intervention is required at this time  We encouraged her to continue with her home exercise program and activities of daily living  She can return for her regularly scheduled annual appointment for her left knee  She can follow up as needed for her right knee  All of her questions were addressed    Subjective: Fall    Patient ID: Cristal Marquez is a 68 y o  female  Turner Lopez is a pleasant 68-year-old female presenting today for follow-up 10 months after undergoing a left total knee arthroplasty  She reports that she suffered a mechanical fall in her kitchen 2 days ago, at which time she landed on both knees her backside  She was able to stand and ambulate after his assistance  She not have significant activity related discomfort in knees at this time  She does have intermittent right knee pain with known end-stage underlying osteoarthritis  Her left knee occasionally gives her discomfort going up and down stairs, but otherwise she is very pleased with her recovery  She denies any feelings of instability    Review of Systems   Constitutional: Negative  HENT: Negative  Eyes: Negative  Respiratory: Negative  Cardiovascular: Negative  Gastrointestinal: Negative  Endocrine: Negative  Genitourinary: Negative      Musculoskeletal: Positive for arthralgias, joint swelling and myalgias  Skin: Negative  Allergic/Immunologic: Negative  Neurological: Negative  Hematological: Negative  Psychiatric/Behavioral: Negative  Past Medical History:   Diagnosis Date    Atrial fibrillation ([facility] )     Carrero's esophagus     last assessed: 1/23/2018    BRCA1 negative     BRCA2 negative     Breast cancer ([facility] ) 2006    stage 1 (left), given adjuvant radiation with Arimidex x 5 years    Cancer [facility]) 2006    Left Breast, Lumpectomy    Cataract     last assessed: 3/11/2014    Dysplasia of toenail     last assessed: 8/29/2017    Esophageal reflux     GERD (gastroesophageal reflux disease)     Gross hematuria     last assessed: 2/19/2015    Hematuria     Hiatal hernia     History of radiation therapy     Hypertension     Irregular heart beat     AFIB    Mixed sensory-motor polyneuropathy     Neuropathy     Obesity     Pacemaker     Paroxysmal atrial fibrillation (HCC)     Peripheral neuropathy     Rectal bleeding     Restless leg syndrome     Shortness of breath     last assessed: 1/11/2016       Past Surgical History:   Procedure Laterality Date    BREAST BIOPSY Left 2006    BREAST LUMPECTOMY Left 2006    onset: 2006    BREAST SURGERY      CARDIAC PACEMAKER PLACEMENT      x 3 2006    CATARACT EXTRACTION      COLONOSCOPY      CYSTOSCOPY  04/04/2014    diagnostic    HYSTERECTOMY      ALISON BSO; due to fibroid uterus; age 38   [employer] KNEE CARTILAGE SURGERY      excision lesion of meniscus or capsule knee    KNEE SURGERY      OOPHORECTOMY Bilateral     OTHER SURGICAL HISTORY      radiation therapy    MA COLONOSCOPY FLX DX W/COLLJ SPEC WHEN PFRMD N/A 2/8/2017    Procedure: COLONOSCOPY;  Surgeon: Yobany Dawkins MD;  [facility]; Service: Gastroenterology    MA ESOPHAGOGASTRODUODENOSCOPY TRANSORAL DIAGNOSTIC N/A 9/20/2017    Procedure: ESOPHAGOGASTRODUODENOSCOPY (EGD);   Surgeon: Js Sinha MD;  [facility]: [facility];  Service: Gastroenterology    DE TOTAL KNEE ARTHROPLASTY Left 2020    Procedure: ARTHROPLASTY KNEE TOTAL;  Surgeon: Brenda Garrett DO;  Location: [facility];  Service: Orthopedics    UPPER GASTROINTESTINAL ENDOSCOPY         Family History   Problem Relation Age of Onset    Hypertension Mother     Heart disease Father     Aneurysm Father     Coronary artery disease Father         in his 76s with aneurysm    Aortic aneurysm Father         abdominal    Scleroderma Sister     Breast cancer Sister 76    Hypertension Sister     Cancer Sister     No Known Problems Son     No Known Problems Son     Testicular cancer Son 39    Thyroid cancer Son 45    Colon cancer Maternal Aunt     Colon cancer Maternal Aunt     Breast cancer Other 48        kaylee's daughter    Alcohol abuse Neg Hx     Substance Abuse Neg Hx     Mental illness Neg Hx     Depression Neg Hx        Social History     Occupational History    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      Comment: retired   Tobacco Use    Smoking status: Former Smoker     Packs/day: 1 00     Years: 25 00     Pack years: 25 00     Types: Cigarettes     Quit date: 1980     Years since quittin 7    Smokeless tobacco: Never Used    Tobacco comment: Quit over 30 years ago; quit age 41   Substance and Sexual Activity    Alcohol use: Not Currently    Drug use: No    Sexual activity: Not on file         Current Outpatient Medications:     acetaminophen (TYLENOL) 325 mg tablet, Take 3 tablets (975 mg total) by mouth every 8 (eight) hours (Patient taking differently: Take 975 mg by mouth every 8 (eight) hours PRN), Disp: 30 tablet, Rfl: 0    acetaminophen (TYLENOL) 650 mg CR tablet, Take 1,300 mg by mouth daily, Disp: , Rfl:     Calcium Acetate, Phos Binder, (CALCIUM ACETATE PO), Take by mouth daily  , Disp: , Rfl:     clobetasol (TEMOVATE) 0 05 % ointment, Apply once weekly to the affected area, Disp: 30 g, Rfl: 3   famotidine (PEPCID) 40 MG tablet, TAKE 1 TABLET BY MOUTH EVERY DAY, Disp: 90 tablet, Rfl: 1    furosemide (LASIX) 40 mg tablet, TAKE 1 TABLET BY MOUTH EVERY DAY (Patient taking differently: Patient takes 20mg a day at the present time), Disp: 90 tablet, Rfl: 3    gabapentin (NEURONTIN) 800 mg tablet, Take 1 tablet (800 mg total) by mouth 4 (four) times a day, Disp: 360 tablet, Rfl: 1    loratadine (CLARITIN) 10 mg tablet, Take 10 mg by mouth daily, Disp: , Rfl:     metoprolol tartrate (LOPRESSOR) 50 mg tablet, TAKE 1 5 TABLETS (75 MG TOTAL) BY MOUTH 2 (TWO) TIMES A DAY (Patient taking differently: Patient takes 1 tab bid), Disp: 270 tablet, Rfl: 2    multivitamin (THERAGRAN) TABS, Take 1 tablet by mouth daily, Disp: , Rfl:     pramipexole (MIRAPEX) 0 5 mg tablet, 2 FULL TABLETS TWICE A DAY AT 5:00 P  M  AND 10:00 P M , Disp: 360 tablet, Rfl: 1    warfarin (COUMADIN) 7 5 mg tablet, Take 1 tablet (7 5 mg total) by mouth daily, Disp: 90 tablet, Rfl: 3    clindamycin (CLEOCIN) 300 MG capsule, Take 2 capsules (600 mg total) by mouth 60 minutes pre-procedure (Patient not taking: Reported on 3/23/2021), Disp: 2 capsule, Rfl: 1    docusate sodium (COLACE) 100 mg capsule, Take 1 capsule (100 mg total) by mouth 2 (two) times a day (Patient not taking: Reported on 1/4/2021), Disp: 60 capsule, Rfl: 0    Allergies   Allergen Reactions    Cephalexin Rash    Duloxetine Hcl Other (See Comments)     Facial pins and needles sensation    Erythromycin Rash    Levofloxacin Other (See Comments)     Muscular aches    Penicillins Rash    Savella [Milnacipran] Rash    Sulfa Antibiotics Rash       Objective:  Vitals:    06/02/21 0936   BP: 122/84   Pulse: 84       Body mass index is 37 35 kg/m²  Right Knee Exam     Muscle Strength   The patient has normal right knee strength  Tenderness   The patient is experiencing tenderness in the medial joint line and MCL      Range of Motion   Extension:  0 normal   Flexion:  120 normal     Tests   Varus: negative Valgus: negative  Drawer:  Anterior - negative        Other   Erythema: absent  Scars: absent  Sensation: normal  Pulse: present  Swelling: none  Effusion: no effusion present    Comments:   Mild varus deformity passively correctable   positive patellofemoral crepitus but negative grind   thigh and calf soft nontender      Left Knee Exam     Muscle Strength   The patient has normal left knee strength  Tenderness   The patient is experiencing no tenderness  Range of Motion   Extension: normal Left knee extension: 0  Flexion: normal Left knee flexion: 120  Tests   Varus: negative Valgus: negative  Drawer:  Anterior - negative     Posterior - negative  Patellar apprehension: negative    Other   Erythema: absent  Scars: present (well healed anterior operative incision)  Sensation: normal  Pulse: present  Swelling: none  Effusion: no effusion present    Comments:  Stable at 0, 30 and 90°  Neurovascularly intact distally  No warmth, erythema or signs of infection  Patella tracks midline and flat            Observations   Left Knee   Negative for effusion  Right Knee   Negative for effusion  Physical Exam  Vitals signs and nursing note reviewed  Constitutional:       Appearance: She is well-developed  Comments: Body mass index is 37 35 kg/m²  HENT:      Head: Normocephalic and atraumatic  Right Ear: External ear normal       Left Ear: External ear normal    Neck:      Musculoskeletal: Normal range of motion  Cardiovascular:      Rate and Rhythm: Normal rate  Pulmonary:      Effort: Pulmonary effort is normal    Abdominal:      Palpations: Abdomen is soft  Musculoskeletal:      Right knee: She exhibits no effusion  Left knee: She exhibits no effusion  Comments: See ortho exam   Skin:     General: Skin is warm and dry  Neurological:      Mental Status: She is alert and oriented to person, place, and time     Psychiatric: Behavior: Behavior normal          Thought Content: Thought content normal          Judgment: Judgment normal          I have personally reviewed pertinent films in PACS of the x-rays taken today of both knees  The right knee again demonstrates severe end-stage tricompartmental degenerative changes with bone-on-bone appearance of medial compartment  The left total knee prosthesis remains well aligned well cemented with no signs of loosening    There is no sign fracture, dislocation or lytic or blastic lesion in either knee

## 2021-06-03 ENCOUNTER — OFFICE VISIT (OUTPATIENT)
Dept: INTERNAL MEDICINE CLINIC | Facility: CLINIC | Age: 81
End: 2021-06-03
Payer: MEDICARE

## 2021-06-03 ENCOUNTER — HOSPITAL ENCOUNTER (OUTPATIENT)
Dept: RADIOLOGY | Facility: HOSPITAL | Age: 81
Discharge: HOME | End: 2021-06-03
Payer: MEDICARE

## 2021-06-03 ENCOUNTER — ANTICOAG VISIT (OUTPATIENT)
Dept: CARDIOLOGY CLINIC | Facility: CLINIC | Age: 81
End: 2021-06-03

## 2021-06-03 ENCOUNTER — APPOINTMENT (OUTPATIENT)
Dept: LAB | Facility: CLINIC | Age: 81
End: 2021-06-03
Payer: MEDICARE

## 2021-06-03 ENCOUNTER — TELEPHONE (OUTPATIENT)
Dept: INTERNAL MEDICINE CLINIC | Facility: CLINIC | Age: 81
End: 2021-06-03

## 2021-06-03 ENCOUNTER — TRANSCRIBE ORDERS (OUTPATIENT)
Dept: LAB | Facility: CLINIC | Age: 81
End: 2021-06-03

## 2021-06-03 VITALS
TEMPERATURE: 96.2 F | WEIGHT: 219 LBS | SYSTOLIC BLOOD PRESSURE: 112 MMHG | OXYGEN SATURATION: 98 % | HEART RATE: 83 BPM | DIASTOLIC BLOOD PRESSURE: 68 MMHG | HEIGHT: 65 IN | BODY MASS INDEX: 36.49 KG/M2

## 2021-06-03 DIAGNOSIS — G25.81 RLS (RESTLESS LEGS SYNDROME): ICD-10-CM

## 2021-06-03 DIAGNOSIS — J30.2 SEASONAL ALLERGIES: ICD-10-CM

## 2021-06-03 DIAGNOSIS — I10 ESSENTIAL HYPERTENSION: ICD-10-CM

## 2021-06-03 DIAGNOSIS — R29.898 WEAKNESS OF LEFT HAND: Primary | ICD-10-CM

## 2021-06-03 DIAGNOSIS — M85.89 OSTEOPENIA OF MULTIPLE SITES: ICD-10-CM
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
## 2021-06-03 DIAGNOSIS — Z12.31 ENCOUNTER FOR SCREENING MAMMOGRAM FOR BREAST CANCER: ICD-10-CM

## 2021-06-03 DIAGNOSIS — R29.898 WEAKNESS OF LEFT HAND: ICD-10-CM

## 2021-06-03 DIAGNOSIS — Z79.899 ENCOUNTER FOR LONG-TERM CURRENT USE OF MEDICATION: ICD-10-CM

## 2021-06-03 DIAGNOSIS — I48.11 LONGSTANDING PERSISTENT ATRIAL FIBRILLATION (HCC): ICD-10-CM

## 2021-06-03 DIAGNOSIS — R73.03 PREDIABETES: ICD-10-CM
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
## 2021-06-03 DIAGNOSIS — K22.719 BARRETT'S ESOPHAGUS WITH DYSPLASIA: ICD-10-CM

## 2021-06-03 DIAGNOSIS — E66.01 SEVERE OBESITY (BMI 35.0-39.9) WITH COMORBIDITY (HCC): ICD-10-CM

## 2021-06-03 LAB
ALBUMIN SERPL BCP-MCNC: 4 G/DL (ref 3.5–5)
ALP SERPL-CCNC: 76 U/L (ref 46–116)
ALT SERPL W P-5'-P-CCNC: 23 U/L (ref 12–78)
ANION GAP SERPL CALCULATED.3IONS-SCNC: 8 MMOL/L (ref 4–13)
AST SERPL W P-5'-P-CCNC: 24 U/L (ref 5–45)
BASOPHILS # BLD AUTO: 0.05 THOUSANDS/ΜL (ref 0–0.1)
BASOPHILS NFR BLD AUTO: 1 % (ref 0–1)
BILIRUB SERPL-MCNC: 0.91 MG/DL (ref 0.2–1)
BUN SERPL-MCNC: 28 MG/DL (ref 5–25)
CALCIUM SERPL-MCNC: 9.6 MG/DL (ref 8.3–10.1)
CHLORIDE SERPL-SCNC: 104 MMOL/L (ref 100–108)
CO2 SERPL-SCNC: 31 MMOL/L (ref 21–32)
CREAT SERPL-MCNC: 1.09 MG/DL (ref 0.6–1.3)
EOSINOPHIL # BLD AUTO: 0 THOUSAND/ΜL (ref 0–0.61)
EOSINOPHIL NFR BLD AUTO: 0 % (ref 0–6)
ERYTHROCYTE [DISTWIDTH] IN BLOOD BY AUTOMATED COUNT: 14.5 % (ref 11.6–15.1)
EST. AVERAGE GLUCOSE BLD GHB EST-MCNC: 114 MG/DL
GFR SERPL CREATININE-BSD FRML MDRD: 49 ML/MIN/1.73SQ M
GLUCOSE SERPL-MCNC: 98 MG/DL (ref 65–140)
HBA1C MFR BLD: 5.6 %
HCT VFR BLD AUTO: 44.5 % (ref 34.8–46.1)
HGB BLD-MCNC: 13.9 G/DL (ref 11.5–15.4)
IMM GRANULOCYTES # BLD AUTO: 0.02 THOUSAND/UL (ref 0–0.2)
IMM GRANULOCYTES NFR BLD AUTO: 0 % (ref 0–2)
LYMPHOCYTES # BLD AUTO: 1.91 THOUSANDS/ΜL (ref 0.6–4.47)
LYMPHOCYTES NFR BLD AUTO: 29 % (ref 14–44)
MCH RBC QN AUTO: 29.7 PG (ref 26.8–34.3)
MCHC RBC AUTO-ENTMCNC: 31.2 G/DL (ref 31.4–37.4)
MCV RBC AUTO: 95 FL (ref 82–98)
MONOCYTES # BLD AUTO: 0.55 THOUSAND/ΜL (ref 0.17–1.22)
MONOCYTES NFR BLD AUTO: 8 % (ref 4–12)
NEUTROPHILS # BLD AUTO: 4.13 THOUSANDS/ΜL (ref 1.85–7.62)
NEUTS SEG NFR BLD AUTO: 62 % (ref 43–75)
NRBC BLD AUTO-RTO: 0 /100 WBCS
PLATELET # BLD AUTO: 212 THOUSANDS/UL (ref 149–390)
PMV BLD AUTO: 11.1 FL (ref 8.9–12.7)
POTASSIUM SERPL-SCNC: 4.4 MMOL/L (ref 3.5–5.3)
PROT SERPL-MCNC: 8.1 G/DL (ref 6.4–8.2)
RBC # BLD AUTO: 4.68 MILLION/UL (ref 3.81–5.12)
SODIUM SERPL-SCNC: 143 MMOL/L (ref 136–145)
TSH SERPL DL<=0.05 MIU/L-ACNC: 0.81 UIU/ML (ref 0.36–3.74)
VIT B12 SERPL-MCNC: 1066 PG/ML (ref 100–900)
WBC # BLD AUTO: 6.66 THOUSAND/UL (ref 4.31–10.16)

## 2021-06-03 PROCEDURE — 84443 ASSAY THYROID STIM HORMONE: CPT | Performed by: INTERNAL MEDICINE

## 2021-06-03 PROCEDURE — 85025 COMPLETE CBC W/AUTO DIFF WBC: CPT | Performed by: INTERNAL MEDICINE

## 2021-06-03 PROCEDURE — 80053 COMPREHEN METABOLIC PANEL: CPT | Performed by: INTERNAL MEDICINE

## 2021-06-03 PROCEDURE — 82607 VITAMIN B-12: CPT | Performed by: INTERNAL MEDICINE

## 2021-06-03 PROCEDURE — 99214 OFFICE O/P EST MOD 30 MIN: CPT | Performed by: INTERNAL MEDICINE

## 2021-06-03 PROCEDURE — 72050 X-RAY EXAM NECK SPINE 4/5VWS: CPT

## 2021-06-03 PROCEDURE — 83036 HEMOGLOBIN GLYCOSYLATED A1C: CPT | Performed by: INTERNAL MEDICINE

## 2021-06-03 NOTE — PROGRESS NOTES
Assessment/Plan:    Atrial fibrillation (HCC) [I48 91]  No symptoms  On warfarin, metoprolol  Echo updated, recently saw cardiology  Carrero's esophagus with dysplasia  On daily famotidine  Prediabetes  Repeat A1c  Peripheral neuropathy  On gabapentin  Per neurology  RLS (restless legs syndrome)  Stable, on pramipexole and gabapentin  Sees neurology  Essential hypertension  BP stable, on metoprolol and furosemide  Osteopenia of multiple sites  Schedule bone density  Seasonal allergies  Add saline nasal spray bid, continue daily claritin  Severe obesity (BMI 35 0-39  9) with comorbidity ([address] )  Stable weight  Diagnoses and all orders for this visit:    Weakness of left hand  Comments:  Differential Dx: cervical radiculopathy  Orders:  -     XR spine cervical complete 4 or 5 vw non injury; Future    Longstanding persistent atrial fibrillation (HCC)    Carrero's esophagus with dysplasia    Essential hypertension  -     CBC and differential  -     Comprehensive metabolic panel  -     TSH, 3rd generation with Free T4 reflex    RLS (restless legs syndrome)    Seasonal allergies  -     sodium chloride (OCEAN) 0 65 % nasal spray; 1 spray into each nostril 2 (two) times a day as needed for rhinitis    Prediabetes  -     Hemoglobin A1C    Encounter for long-term current use of medication  -     Vitamin B12    Encounter for screening mammogram for breast cancer  -     Mammo screening bilateral w 3d & cad; Future    Osteopenia of multiple sites  -     DXA bone density spine hip and pelvis; Future    Severe obesity (BMI 35 0-39  9) with comorbidity ([facility] [address] )    Fall in home, initial encounter  Comments:  Discussed physical therapy  Follow up in 4 months or as needed  Subjective:      Patient ID: Misha Long is a 68 y o  female here for a follow up  She complains head pressure and feeling a bit fuzzy the past few weeks  She experience a dry cough    She started Claritin and symptoms somewhat improved  She continues to experience nasal congestion  Denies any chest pain, shortness of breath or wheezing  She fell at home in her kitchen about a week ago  She was turning and lost her balance, felt her legs give out  She did see Ortho, was told everything was okay,  Was worried about both knees since she had surgery  She reports no head injury  She mostly landed on her back and knees  Denies any bruising  She denies any syncopal episodes  She also reports left hand weakness  She noticed that she has been dropping things recently, it happened twice  Denies any right-sided symptoms  Denies any neck pain or shoulder pain  She reports head discomfort, feels pressure at the top of her head, not her usual headache  She denies any head injury recently  The following portions of the patient's history were reviewed and updated as appropriate: allergies, current medications, past medical history, past social history, past surgical history and problem list     Review of Systems   Constitutional: Negative for activity change, appetite change and fatigue  HENT: Positive for congestion and rhinorrhea  Negative for ear pain and postnasal drip  Eyes: Negative for visual disturbance  Respiratory: Negative for cough, shortness of breath and wheezing  Cardiovascular: Negative for chest pain, palpitations and leg swelling  Gastrointestinal: Negative for abdominal pain, constipation and diarrhea  Genitourinary: Negative for dysuria and frequency  Musculoskeletal: Positive for arthralgias and gait problem  Negative for myalgias  Skin: Negative for rash and wound  Neurological: Positive for weakness and headaches  Negative for dizziness, light-headedness and numbness  Hematological: Does not bruise/bleed easily  Psychiatric/Behavioral: Negative for confusion           Objective:      /68   Pulse 83   Temp (!) 96 2 °F (35 7 °C)   Ht 5' 4 5" (1 638 m)   Wt 99 3 kg (219 lb)   SpO2 98%   BMI 37 01 kg/m²          Physical Exam  Vitals signs and nursing note reviewed  Constitutional:       General: She is not in acute distress  Appearance: She is well-developed  HENT:      Head: Normocephalic and atraumatic  Right Ear: Tympanic membrane, ear canal and external ear normal       Left Ear: Tympanic membrane, ear canal and external ear normal       Nose: Mucosal edema and rhinorrhea present  Eyes:      Pupils: Pupils are equal, round, and reactive to light  Cardiovascular:      Rate and Rhythm: Normal rate and regular rhythm  Heart sounds: Normal heart sounds  Pulmonary:      Effort: Pulmonary effort is normal       Breath sounds: Normal breath sounds  No wheezing  Abdominal:      General: Bowel sounds are normal       Palpations: Abdomen is soft  Musculoskeletal:      Cervical back: She exhibits normal range of motion, no tenderness, no bony tenderness, no pain and no spasm  Skin:     General: Skin is warm  Findings: No rash  Neurological:      General: No focal deficit present  Mental Status: She is alert and oriented to person, place, and time  Cranial Nerves: Cranial nerves are intact  Sensory: No sensory deficit  Motor: Motor function is intact  No weakness  Coordination: Coordination normal  Finger-Nose-Finger Test normal    Psychiatric:         Behavior: Behavior normal            Labs & imaging results reviewed with patient

## 2021-06-08 ENCOUNTER — TELEPHONE (OUTPATIENT)
Dept: INTERNAL MEDICINE CLINIC | Facility: CLINIC | Age: 81
End: 2021-06-08

## 2021-06-08 NOTE — TELEPHONE ENCOUNTER
Neck x-ray showed moderate arthritis in your neck  There is severe narrowing on the right side, mild narrowing on the left - you had complained of left sided weakness more than the right  Recommend to start physical therapy, may help with pain and weakness

## 2021-06-25 ENCOUNTER — OFFICE VISIT (OUTPATIENT)
Dept: URGENT CARE | Facility: CLINIC | Age: 81
End: 2021-06-25
Payer: MEDICARE

## 2021-06-25 VITALS
WEIGHT: 219 LBS | TEMPERATURE: 97.7 F | OXYGEN SATURATION: 96 % | SYSTOLIC BLOOD PRESSURE: 134 MMHG | HEIGHT: 65 IN | RESPIRATION RATE: 18 BRPM | BODY MASS INDEX: 36.49 KG/M2 | HEART RATE: 88 BPM | DIASTOLIC BLOOD PRESSURE: 87 MMHG

## 2021-06-25 DIAGNOSIS — L03.031 PARONYCHIA OF GREAT TOE OF RIGHT FOOT: Primary | ICD-10-CM

## 2021-06-25 PROCEDURE — 99213 OFFICE O/P EST LOW 20 MIN: CPT | Performed by: FAMILY MEDICINE

## 2021-06-25 RX ORDER — CLINDAMYCIN HYDROCHLORIDE 300 MG/1
300 CAPSULE ORAL 3 TIMES DAILY
Qty: 21 CAPSULE | Refills: 0 | Status: SHIPPED | OUTPATIENT
Start: 2021-06-25 | End: 2021-07-02

## 2021-06-25 NOTE — PROGRESS NOTES
[facility]        NAME: Rl Titus is a 68 y o  female  : 1940    MRN: [id number]  DATE: 2021  TIME: 12:19 PM    Assessment and Plan   Paronychia of great toe of right foot [L03 [id number]]  1  Paronychia of great toe of right foot  clindamycin (CLEOCIN) 300 MG capsule         Patient Instructions       If the redness and swelling around the nail bed are not improved in the next 4-5 days, call Podiatry for an earlier appointment  Follow up with PCP in 3-5 days  Proceed to  ER if symptoms worsen  Chief Complaint     Chief Complaint   Patient presents with    Toe Pain     Pt reports of right toe pain with swelling started approx 1 week ago  Pt is concerned for infection  History of Present Illness       Toe Pain (Pt reports of right toe pain with swelling started approx 1 week ago  Pt is concerned for infection  )      Toe Pain   The incident occurred 5 to 7 days ago  The incident occurred at home  Injury mechanism: Patient had a piece of skin sticking up on the lateral aspect of the right great toe, she clipped it with a nail clipper, and she thinks she might would con a little too close  The quality of the pain is described as aching  The pain is at a severity of 1/10  Review of Systems   Review of Systems   Constitutional: Negative  Respiratory: Negative  Cardiovascular: Negative  Skin: Positive for wound           Current Medications       Current Outpatient Medications:     acetaminophen (TYLENOL) 650 mg CR tablet, Take 1,300 mg by mouth daily, Disp: , Rfl:     Calcium Acetate, Phos Binder, (CALCIUM ACETATE PO), Take by mouth daily  , Disp: , Rfl:     clindamycin (CLEOCIN) 300 MG capsule, Take 1 capsule (300 mg total) by mouth 3 (three) times a day for 7 days, Disp: 21 capsule, Rfl: 0    clobetasol (TEMOVATE) 0 05 % ointment, Apply once weekly to the affected area, Disp: 30 g, Rfl: 3    famotidine (PEPCID) 40 MG tablet, TAKE 1 TABLET BY MOUTH EVERY DAY, Disp: 90 tablet, Rfl: 1    furosemide (LASIX) 40 mg tablet, TAKE 1 TABLET BY MOUTH EVERY DAY (Patient taking differently: Patient takes 20mg a day at the present time), Disp: 90 tablet, Rfl: 3    gabapentin (NEURONTIN) 800 mg tablet, Take 1 tablet (800 mg total) by mouth 4 (four) times a day, Disp: 360 tablet, Rfl: 1    loratadine (CLARITIN) 10 mg tablet, Take 10 mg by mouth daily, Disp: , Rfl:     metoprolol tartrate (LOPRESSOR) 50 mg tablet, TAKE 1 5 TABLETS (75 MG TOTAL) BY MOUTH 2 (TWO) TIMES A DAY (Patient taking differently: Patient takes 1 tab bid), Disp: 270 tablet, Rfl: 2    multivitamin (THERAGRAN) TABS, Take 1 tablet by mouth daily, Disp: , Rfl:     pramipexole (MIRAPEX) 0 5 mg tablet, 2 FULL TABLETS TWICE A DAY AT 5:00 P  M   AND 10:00 P M , Disp: 360 tablet, Rfl: 1    sodium chloride (OCEAN) 0 65 % nasal spray, 1 spray into each nostril 2 (two) times a day as needed for rhinitis, Disp: 60 mL, Rfl: 1    warfarin (COUMADIN) 7 5 mg tablet, Take 1 tablet (7 5 mg total) by mouth daily, Disp: 90 tablet, Rfl: 3    Current Allergies     Allergies as of 06/25/2021 - Reviewed 06/25/2021   Allergen Reaction Noted    Cephalexin Rash 03/11/2015    Duloxetine hcl Other (See Comments) 09/20/2017    Erythromycin Rash 02/07/2017    Levofloxacin Other (See Comments) 09/20/2017    Penicillins Rash 02/07/2017    Savella [milnacipran] Rash 04/11/2018    Sulfa antibiotics Rash 02/07/2017            The following portions of the patient's history were reviewed and updated as appropriate: allergies, current medications, past family history, past medical history, past social history, past surgical history and problem list      Past Medical History:   Diagnosis Date    Atrial fibrillation ([facility] [address] )     Carrero's esophagus     last assessed: 1/23/2018    BRCA1 negative     BRCA2 negative     Breast cancer ([facility] [address] ) 2006    stage 1 (left), given adjuvant radiation with Arimidex x 5 years    Cancer [facility]) 2006 Left Breast, Lumpectomy    Cataract     last assessed: 3/11/2014    Dysplasia of toenail     last assessed: 8/29/2017    Esophageal reflux     GERD (gastroesophageal reflux disease)     Gross hematuria     last assessed: 2/19/2015    Hematuria     Hiatal hernia     History of radiation therapy     Hypertension     Irregular heart beat     AFIB    Mixed sensory-motor polyneuropathy     Neuropathy     Obesity     Pacemaker     Paroxysmal atrial fibrillation (HCC)     Peripheral neuropathy     Rectal bleeding     Restless leg syndrome     Shortness of breath     last assessed: 1/11/2016       Past Surgical History:   Procedure Laterality Date    BREAST BIOPSY Left 2006    BREAST LUMPECTOMY Left 2006    onset: 2006    BREAST SURGERY      CARDIAC PACEMAKER PLACEMENT      x 3 2006    CATARACT EXTRACTION      COLONOSCOPY      CYSTOSCOPY  04/04/2014    diagnostic    HYSTERECTOMY      ALISON BSO; due to fibroid uterus; age 38   [facility] KNEE CARTILAGE SURGERY      excision lesion of meniscus or capsule knee    KNEE SURGERY      OOPHORECTOMY Bilateral     OTHER SURGICAL HISTORY      radiation therapy    IN COLONOSCOPY FLX DX W/COLLJ SPEC WHEN PFRMD N/A 2/8/2017    Procedure: COLONOSCOPY;  Surgeon: Jh Jolly MD;  [facility]; Service: Gastroenterology    IN ESOPHAGOGASTRODUODENOSCOPY TRANSORAL DIAGNOSTIC N/A 9/20/2017    Procedure: ESOPHAGOGASTRODUODENOSCOPY (EGD); Surgeon: Nils Moritz, MD;  Location: [facility];   Service: Gastroenterology    IN TOTAL KNEE ARTHROPLASTY Left 8/17/2020    Procedure: ARTHROPLASTY KNEE TOTAL;  Surgeon: Delicia Stover DO;  Location: [facility];  Service: Orthopedics    UPPER GASTROINTESTINAL ENDOSCOPY         Family History   Problem Relation Age of Onset    Hypertension Mother     Heart disease Father     Aneurysm Father     Coronary artery disease Father         in his 76s with aneurysm    Aortic aneurysm Father         abdominal    Scleroderma Sister  Breast cancer Sister 76    Hypertension Sister     Cancer Sister     No Known Problems Son     No Known Problems Son     Testicular cancer Son 39    Thyroid cancer Son 45    Colon cancer Maternal Aunt     Colon cancer Maternal Aunt     Breast cancer Other 48        kaylee's daughter    Alcohol abuse Neg Hx     Substance Abuse Neg Hx     Mental illness Neg Hx     Depression Neg Hx          Medications have been verified  Objective   /87   Pulse 88   Temp 97 7 °F (36 5 °C)   Resp 18   Ht 5' 4 5" (1 638 m)   Wt 99 3 kg (219 lb)   SpO2 96%   BMI 37 01 kg/m²   No LMP recorded  Patient is postmenopausal        Physical Exam     Physical Exam  Vitals and nursing note reviewed  Constitutional:       Appearance: Normal appearance  She is well-developed  Cardiovascular:      Rate and Rhythm: Normal rate and regular rhythm  Pulmonary:      Effort: Pulmonary effort is normal       Breath sounds: Normal breath sounds  Skin:         Neurological:      Mental Status: She is alert

## 2021-06-25 NOTE — PATIENT INSTRUCTIONS
If the redness and swelling around the nail bed are not improved in the next 4-5 days, call Podiatry for an earlier appointment  Follow up with PCP in 3-5 days  Proceed to  ER if symptoms worsen  Paronychia   WHAT YOU NEED TO KNOW:   Paronychia is an infection of your nail fold caused by bacteria or a fungus  The nail fold is the skin around your nail  Paronychia may happen suddenly and last for 6 weeks or longer  You may have paronychia on more than 1 finger or toe  DISCHARGE INSTRUCTIONS:   Medicines:   · Td vaccine  is a booster shot used to help prevent tetanus and diphtheria  The Td booster may be given to adolescents and adults every 10 years or for certain wounds and injuries  · Antibiotics: This medicine will help fight or prevent an infection  It may be given as a pill, cream, or ointment  · Steroids: This medicine will help decrease inflammation  It may be given as a pill, cream, or ointment  · Antifungal medicine: This medicine helps kill fungus that may be causing your infection  It may be given as a cream or ointment  · NSAIDs:  These medicines decrease pain and swelling  NSAIDs are available without a doctor's order  Ask your healthcare provider which medicine is right for you  Ask how much to take and when to take it  Take as directed  NSAIDs can cause stomach bleeding and kidney problems if not taken correctly  · Take your medicine as directed  Contact your healthcare provider if you think your medicine is not helping or if you have side effects  Tell him of her if you are allergic to any medicine  Keep a list of the medicines, vitamins, and herbs you take  Include the amounts, and when and why you take them  Bring the list or the pill bottles to follow-up visits  Carry your medicine list with you in case of an emergency  Follow up with your healthcare provider as directed:  Write down your questions so you remember to ask them during your visits     Self-care:   · Soak your nail:  Soak your nail in a mixture of equal parts vinegar and water 3 or 4 times each day  This will help decrease inflammation  · Apply a warm compress:  Soak a washcloth in warm water and place it on your nail  This will help decrease inflammation  · Elevate:  Raise your nail above the level of your heart as often as you can  This will help decrease swelling and pain  Prop your nail on pillows or blankets to keep it elevated comfortably  · Use lotion:  Apply lotion after you wash your hands  This will prevent your skin from becoming too dry  Prevent paronychia:   · Avoid chemicals and allergens that may harm your skin and nails  This includes soaps, laundry detergents, and nail products  · Keep your nails clean and dry  Avoid soaking your nails in water  Use cotton-lined rubber gloves or wear 2 rubber gloves if you work with food or water  The gloves will help protect your nail folds  · Keep your nails short  Do not bite your nails, pick at your hangnails, suck your fingers, or wear fake nails  Bring your own nail tools when you go to the nail salon  Contact your healthcare provider if:   · Your nail becomes loose, deformed, or falls off  · You have a large abscess on your nail  · You have questions or concerns about your condition or care  Return to the emergency department if:   · You have severe nail pain  · The inflammation spreads to your hand or arm  © Copyright [address] Information is for End User's use only and may not be sold, redistributed or otherwise used for commercial purposes  All illustrations and images included in CareNotes® are the copyrighted property of A D A M , Inc  or [facility]   The above information is an  only  It is not intended as medical advice for individual conditions or treatments  Talk to your doctor, nurse or pharmacist before following any medical regimen to see if it is safe and effective for you

## 2021-06-29 ENCOUNTER — OFFICE VISIT (OUTPATIENT)
Dept: PODIATRY | Facility: CLINIC | Age: 81
End: 2021-06-29
Payer: MEDICARE

## 2021-06-29 VITALS — HEIGHT: 65 IN | WEIGHT: 219 LBS | RESPIRATION RATE: 17 BRPM | BODY MASS INDEX: 36.49 KG/M2

## 2021-06-29 DIAGNOSIS — M79.671 RIGHT FOOT PAIN: ICD-10-CM

## 2021-06-29 DIAGNOSIS — E11.51 TYPE 2 DIABETES MELLITUS WITH DIABETIC PERIPHERAL ANGIOPATHY WITHOUT GANGRENE, WITHOUT LONG-TERM CURRENT USE OF INSULIN (HCC): Primary | ICD-10-CM

## 2021-06-29 DIAGNOSIS — L03.031 PARONYCHIA OF TOENAIL OF RIGHT FOOT: ICD-10-CM

## 2021-06-29 PROCEDURE — 99212 OFFICE O/P EST SF 10 MIN: CPT | Performed by: PODIATRIST

## 2021-06-29 NOTE — PROGRESS NOTES
Assessment/Plan:  Self induced paronychia right hallux  Diabetic neuropathy  Pain upon ambulation  Plan  Foot exam performed  Right hallux nail debrided  Gentian violet dry sterile dressing applied  Patient will bandage daily for 1 week  She will watch for signs of infection  Diagnoses and all orders for this visit:    Type 2 diabetes mellitus with diabetic peripheral angiopathy without gangrene, without long-term current use of insulin (HCC)    Right foot pain    Paronychia of toenail of right foot          Subjective:   Urgent visit  Patient recently tried to cut nail since that time she has had pain in her right big toe      Allergies   Allergen Reactions    Cephalexin Rash    Duloxetine Hcl Other (See Comments)     Facial pins and needles sensation    Erythromycin Rash    Levofloxacin Other (See Comments)     Muscular aches    Penicillins Rash    Savella [Milnacipran] Rash    Sulfa Antibiotics Rash         Current Outpatient Medications:     acetaminophen (TYLENOL) 650 mg CR tablet, Take 1,300 mg by mouth daily, Disp: , Rfl:     Calcium Acetate, Phos Binder, (CALCIUM ACETATE PO), Take by mouth daily  , Disp: , Rfl:     clindamycin (CLEOCIN) 300 MG capsule, Take 1 capsule (300 mg total) by mouth 3 (three) times a day for 7 days, Disp: 21 capsule, Rfl: 0    clobetasol (TEMOVATE) 0 05 % ointment, Apply once weekly to the affected area, Disp: 30 g, Rfl: 3    famotidine (PEPCID) 40 MG tablet, TAKE 1 TABLET BY MOUTH EVERY DAY, Disp: 90 tablet, Rfl: 1    furosemide (LASIX) 40 mg tablet, TAKE 1 TABLET BY MOUTH EVERY DAY (Patient taking differently: Patient takes 20mg a day at the present time), Disp: 90 tablet, Rfl: 3    gabapentin (NEURONTIN) 800 mg tablet, Take 1 tablet (800 mg total) by mouth 4 (four) times a day, Disp: 360 tablet, Rfl: 1    loratadine (CLARITIN) 10 mg tablet, Take 10 mg by mouth daily, Disp: , Rfl:     metoprolol tartrate (LOPRESSOR) 50 mg tablet, TAKE 1 5 TABLETS (75 MG TOTAL) BY MOUTH 2 (TWO) TIMES A DAY (Patient taking differently: Patient takes 1 tab bid), Disp: 270 tablet, Rfl: 2    multivitamin (THERAGRAN) TABS, Take 1 tablet by mouth daily, Disp: , Rfl:     pramipexole (MIRAPEX) 0 5 mg tablet, 2 FULL TABLETS TWICE A DAY AT 5:00 P  M  AND 10:00 P M , Disp: 360 tablet, Rfl: 1    sodium chloride (OCEAN) 0 65 % nasal spray, 1 spray into each nostril 2 (two) times a day as needed for rhinitis, Disp: 60 mL, Rfl: 1    warfarin (COUMADIN) 7 5 mg tablet, Take 1 tablet (7 5 mg total) by mouth daily, Disp: 90 tablet, Rfl: 3    Patient Active Problem List   Diagnosis    Hiatal hernia    Atrial fibrillation (HCC) [I48 91]    Acquired deformity of foot    Corns    Diabetic polyneuropathy associated with type 2 diabetes mellitus (HCC)    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HCC)    Radiculopathy of lumbar region    Primary osteoarthritis of both knees    Sensory polyneuropathy    RLS (restless legs syndrome)    Arthritis    Essential hypertension    Multiple lung nodules    Severe obesity (BMI 35 0-39  9) with comorbidity ([facility] [address] )    Osteopenia of multiple sites    Peripheral neuropathy    Vitamin D deficiency    Dense breast tissue on mammogram    Difficulty walking    Flat foot    Onychomycosis    Carrero's esophagus with dysplasia    Mild cognitive impairment    Pacemaker    GERD (gastroesophageal reflux disease)    Prediabetes    Pain in both feet    Chronic edema    Deep venous insufficiency    Colon polyps    Lichen sclerosus et atrophicus of the vulva    Status post total knee replacement using cement, left    Leg swelling    Seasonal allergies          Patient ID: Cory Arredondo is a 68 y o  female  HPI    The following portions of the patient's history were reviewed and updated as appropriate:     family history includes Aneurysm in her father;  Aortic aneurysm in her father; Breast cancer (age of onset: 48) in her other; Breast cancer (age of onset: 76) in her sister; Cancer in her sister; Colon cancer in her maternal aunt and maternal aunt; Coronary artery disease in her father; Heart disease in her father; Hypertension in her mother and sister; No Known Problems in her son and son; Scleroderma in her sister; Testicular cancer (age of onset: 39) in her son; Thyroid cancer (age of onset: 45) in her son  reports that she quit smoking about 40 years ago  Her smoking use included cigarettes  She has a 25 00 pack-year smoking history  She has never used smokeless tobacco  She reports previous alcohol use  She reports that she does not use drugs  Vitals:    06/29/21 1122   Resp: 17       Review of Systems      Objective:  Patient's shoes and socks removed  Foot ExamPhysical Exam            Physical Exam  Left Foot: Appearance: Normal except as noted: excessive pronation-- and-- pes planus  Tenderness: None except the lisfranc joint-- and-- medial longitudinal arch  ROM: subtalar motion was restricted  Right Foot: Appearance: Normal except as noted: excessive pronation-- and-- pes planus  Tenderness: None except the lisfranc joint-- and-- medial longitudinal arch  ROM: subtalar motion was restricted   Left Ankle: ROM: limited ROM in all planes   Right Ankle: ROM: limited ROM in all planes   Neurological Exam: performed  Light touch was decreased bilaterally  Vibratory sensation was decreased in both first metatarsophalangeal joints  Deep tendon reflexes: achilles reflex absent bilateraly-- and-- 4/5 L5 testing bilateral    Vascular Exam: performed Dorsalis pedis pulses were diminished bilaterally  Posterior tibial pulses were diminished bilaterally  Dependence rubor was present bilaterally  Capillary refill time was Negative digital hair noted, but-- between 1-3 seconds bilaterally  Toenails: All of the toenails were elongated,-- hypertrophied,-- discolored-- and--   Hyperkeratosis: present on both first toes     Shoe Gear Evaluation: performed ()  Recommendation(s): SAS style       Patient's shoes and socks removed  Right Foot/Ankle   Right Foot Inspection        Sensory   Vibration: diminished  Proprioception: diminished      Vascular  Capillary refills: elevated        Left Foot/Ankle  Left Foot Inspection                    Sensory   Vibration: diminished  Proprioception: diminished     Vascular  Capillary refills: elevated      Patient's shoes and socks removed  Assign Risk Category:  Deformity present; Loss of protective sensation; Weak pulses       Risk: 2        Right hallux demonstrates paronychia of the fibular nail groove  There appears to be a laceration of the skin of the foot    Negative occult abscess or sinus noted

## 2021-06-30 ENCOUNTER — APPOINTMENT (OUTPATIENT)
Dept: LAB | Facility: HOSPITAL | Age: 81
End: 2021-06-30
Payer: MEDICARE

## 2021-06-30 ENCOUNTER — ANTICOAG VISIT (OUTPATIENT)
Dept: CARDIOLOGY CLINIC | Facility: CLINIC | Age: 81
End: 2021-06-30

## 2021-06-30 DIAGNOSIS — I48.91 ATRIAL FIBRILLATION, UNSPECIFIED TYPE (HCC): Primary | ICD-10-CM

## 2021-06-30 DIAGNOSIS — K21.9 GASTROESOPHAGEAL REFLUX DISEASE: ICD-10-CM

## 2021-06-30 LAB
INR PPP: 2.29 (ref 0.84–1.19)
PROTHROMBIN TIME: 24.9 SECONDS (ref 11.6–14.5)

## 2021-06-30 PROCEDURE — 36415 COLL VENOUS BLD VENIPUNCTURE: CPT

## 2021-06-30 PROCEDURE — 85610 PROTHROMBIN TIME: CPT

## 2021-07-06 ENCOUNTER — OFFICE VISIT (OUTPATIENT)
Dept: NEUROLOGY | Facility: CLINIC | Age: 81
End: 2021-07-06
Payer: MEDICARE

## 2021-07-06 VITALS
SYSTOLIC BLOOD PRESSURE: 124 MMHG | DIASTOLIC BLOOD PRESSURE: 79 MMHG | BODY MASS INDEX: 37.82 KG/M2 | HEART RATE: 79 BPM | HEIGHT: 65 IN | WEIGHT: 227 LBS

## 2021-07-06 DIAGNOSIS — I48.20 CHRONIC ATRIAL FIBRILLATION (HCC): ICD-10-CM

## 2021-07-06 DIAGNOSIS — G60.8 SENSORY POLYNEUROPATHY: ICD-10-CM

## 2021-07-06 DIAGNOSIS — G25.81 RLS (RESTLESS LEGS SYNDROME): ICD-10-CM

## 2021-07-06 DIAGNOSIS — G62.9 LARGE FIBER NEUROPATHY: ICD-10-CM

## 2021-07-06 DIAGNOSIS — G62.9 PERIPHERAL POLYNEUROPATHY: Primary | ICD-10-CM

## 2021-07-06 PROCEDURE — 99214 OFFICE O/P EST MOD 30 MIN: CPT | Performed by: PSYCHIATRY & NEUROLOGY

## 2021-07-06 RX ORDER — FAMOTIDINE 40 MG/1
TABLET, FILM COATED ORAL
Qty: 90 TABLET | Refills: 1 | Status: SHIPPED | OUTPATIENT
Start: 2021-07-06 | End: 2022-01-06

## 2021-07-06 RX ORDER — GABAPENTIN 800 MG/1
800 TABLET ORAL 4 TIMES DAILY
Qty: 360 TABLET | Refills: 1 | Status: SHIPPED | OUTPATIENT
Start: 2021-07-06 | End: 2022-01-11 | Stop reason: SDUPTHER

## 2021-07-06 NOTE — PROGRESS NOTES
Return NeuroOutpatient Note        Delta Dockery  [id number]  68 y o   1940       CC: neuropathy      History obtained from:  Patient     HPI/Subjective:    Delta Dockery is a 69 yo F with PMH of neuropathy, A fib, RLS  returns as f/u  She is former patient of Dr Yojana Benedict  Patient had borderline diabetes  Her most recent A1c was 5 6  she used to get burning and pins and needle sensation but now only gets numbness  Patient has been on gabapentin 800mg qid  patient does note tiredness and drowsiness but is not sure whether it's from medication  Her most recent HgbA1c was 5 6  She tries to stay active at home  Last week, she joined gym  She also suffers from RLS  Overtime we have adjusted dose to now 1mg bid of mirapex  She does have some difficulty with balance that is chronic  A month ago, patient was putting garbage away and while turning had sudden fall without LOC       She lives with her   Her memory is not good   She has hard time remembering when her conditions started       She is getting left knee replaced on Aug 12th        Past Medical History:   Diagnosis Date    Atrial fibrillation ([facility] [address] )     Carrero's esophagus     last assessed: 1/23/2018    BRCA1 negative     BRCA2 negative     Breast cancer ([facility] [address] ) 2006    stage 1 (left), given adjuvant radiation with Arimidex x 5 years    Cancer [facility]) 2006    Left Breast, Lumpectomy    Cataract     last assessed: 3/11/2014    Dysplasia of toenail     last assessed: 8/29/2017    Esophageal reflux     GERD (gastroesophageal reflux disease)     Gross hematuria     last assessed: 2/19/2015    Hematuria     Hiatal hernia     History of radiation therapy     Hypertension     Irregular heart beat     AFIB    Mixed sensory-motor polyneuropathy     Neuropathy     Obesity     Pacemaker     Paroxysmal atrial fibrillation (HCC)     Peripheral neuropathy     Rectal bleeding     Restless leg syndrome     Shortness of breath last assessed: 2016     Social History     Socioeconomic History    Marital status: /Civil Union     Spouse name: Not on file    Number of children: Not on file    Years of education: Not on file    Highest education level: Not on file   Occupational History    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      Comment: retired   Tobacco Use    Smoking status: Former Smoker     Packs/day: 1 00     Years: 25 00     Pack years: 25 00     Types: Cigarettes     Quit date: 1980     Years since quittin 8    Smokeless tobacco: Never Used    Tobacco comment: Quit over 30 years ago; quit age 41   Vaping Use    Vaping Use: Never used   Substance and Sexual Activity    Alcohol use: Not Currently    Drug use: No    Sexual activity: Not on file   Other Topics Concern    Not on file   Social History Narrative         Social Determinants of Health     Financial Resource Strain:     Difficulty of Paying Living Expenses:    Food Insecurity:     Worried About [address] in the Last Year:     [address] [employer] in the Last Year:    Transportation Needs:     Lack of Transportation (Medical):      Lack of Transportation (Non-Medical):    Physical Activity:     Days of Exercise per Week:     Minutes of Exercise per Session:    Stress:     Feeling of Stress :    Social Connections:     Frequency of Communication with Friends and Family:     Frequency of Social Gatherings with Friends and Family:     Attends Denominational Services:     Active Member of Clubs or Organizations:     Attends Club or Organization Meetings:     Marital Status:    Intimate Partner Violence:     Fear of Current or Ex-Partner:     Emotionally Abused:     Physically Abused:     Sexually Abused:      Family History   Problem Relation Age of Onset    Hypertension Mother     Heart disease Father     Aneurysm Father     Coronary artery disease Father         in his 76s with aneurysm    Aortic aneurysm Father         abdominal    Scleroderma Sister     Breast cancer Sister 76    Hypertension Sister     Cancer Sister     No Known Problems Son     No Known Problems Son     Testicular cancer Son 39    Thyroid cancer Son 45    Colon cancer Maternal Aunt     Colon cancer Maternal Aunt     Breast cancer Other 48        kaylee's daughter    Alcohol abuse Neg Hx     Substance Abuse Neg Hx     Mental illness Neg Hx     Depression Neg Hx      Allergies   Allergen Reactions    Cephalexin Rash    Duloxetine Hcl Other (See Comments)     Facial pins and needles sensation    Erythromycin Rash    Levofloxacin Other (See Comments)     Muscular aches    Penicillins Rash    Savella [Milnacipran] Rash    Sulfa Antibiotics Rash     Current Outpatient Medications on File Prior to Visit   Medication Sig Dispense Refill    acetaminophen (TYLENOL) 650 mg CR tablet Take 1,300 mg by mouth daily      Calcium Acetate, Phos Binder, (CALCIUM ACETATE PO) Take by mouth daily        clobetasol (TEMOVATE) 0 05 % ointment Apply once weekly to the affected area 30 g 3    famotidine (PEPCID) 40 MG tablet TAKE 1 TABLET BY MOUTH EVERY DAY 90 tablet 1    furosemide (LASIX) 40 mg tablet TAKE 1 TABLET BY MOUTH EVERY DAY (Patient taking differently: 40 mg ) 90 tablet 3    loratadine (CLARITIN) 10 mg tablet Take 10 mg by mouth daily      metoprolol tartrate (LOPRESSOR) 50 mg tablet TAKE 1 5 TABLETS (75 MG TOTAL) BY MOUTH 2 (TWO) TIMES A DAY (Patient taking differently: Patient takes 1 tab bid) 270 tablet 2    multivitamin (THERAGRAN) TABS Take 1 tablet by mouth daily      pramipexole (MIRAPEX) 0 5 mg tablet 2 FULL TABLETS TWICE A DAY AT 5:00 P  M   AND 10:00 P M  360 tablet 1    sodium chloride (OCEAN) 0 65 % nasal spray 1 spray into each nostril 2 (two) times a day as needed for rhinitis 60 mL 1    warfarin (COUMADIN) 7 5 mg tablet Take 1 tablet (7 5 mg total) by mouth daily 90 tablet 3    [DISCONTINUED] gabapentin (NEURONTIN) 800 mg tablet Take 1 tablet (800 mg total) by mouth 4 (four) times a day 360 tablet 1     No current facility-administered medications on file prior to visit  Review of Systems   Refer to positive review of systems in HPI  Review of Systems    Constitutional- No fever  Eyes- No visual change  ENT- Hearing normal  CV- No chest pain  Resp- No Shortness of breath  GI- No diarrhea  - Bladder normal  MS- No Arthritis   Skin- No rash  Psych- No depression  Endo- No DM  Heme- No nodes    Vitals:    07/06/21 1029   BP: 124/79   BP Location: Right arm   Patient Position: Sitting   Cuff Size: Large   Pulse: 79   Weight: 103 kg (227 lb)   Height: 5' 4 5" (1 638 m)       PHYSICAL EXAM:  Appearance: No Acute Distress  Ophthalmoscopic: Disc Flat, Normal fundus  Mental status:  Orientation: Awake, Alert, and Orientedx3  Memory: Registation 3/3 Recall 3/3  Attention: normal  Knowledge: good  Language: No aphasia  Speech: No dysarthria  Cranial Nerves:  2 No Visual Defect on Confrontation, Pupils round, equal, reactive to light  3,4,6 Extraocular Movements Intact, no nystagmus  5 Facial Sensation Intact  7 No facial asymmetry  8 Intact hearing  9,10 Palate symmetric, normal gag  11 Good shoulder shrug  12 Tongue Midline  Gait: wide based, loses balance when turns     Coordination: No ataxia with finger to nose testing, and heel to shin  Sensory: Intact, Symmetric to pinprick, light touch, vibration, and joint position  Muscle Tone: Normal              Muscle exam:  Arm Right Left Leg Right Left   Deltoid 5/5 5/5 Iliopsoas 5/5 5/5   Biceps 5/5 5/5 Quads 5/5 5/5   Triceps 5/5 5/5 Hamstrings 5/5 5/5   Wrist Extension 5/5 5/5 Ankle Dorsi Flexion 5/5 5/5   Wrist Flexion 5/5 5/5 Ankle Plantar Flexion 5/5 5/5   Interossei 5/5 5/5 Ankle Eversion 5/5 5/5   APB 5/5 5/5 Ankle Inversion 5/5 5/5       Reflexes   RJ BJ TJ KJ AJ Plantars Baig's   Right 2+ 2+ 2+ 1+ 0 Downgoing Not present   Left 2+ 2+ 2+ 1+ 0 Downgoing Not present     Personal review of  Labs:                  Diagnoses and all orders for this visit:      1  Peripheral polyneuropathy     2  RLS (restless legs syndrome)     3  Chronic atrial fibrillation ([address] )     4  Sensory polyneuropathy  gabapentin (NEURONTIN) 800 mg tablet   5  Large fiber neuropathy  gabapentin (NEURONTIN) 800 mg tablet       Patient has remained stable  Discussed reducing gabapentin to 800mg tid instead of qid to see if it helps with her tiredness  Will resume mirapex 1mg bid  It works very well for her  We believe her balance problem is from inner ear pathology as she does get tinnitus  She is in process of losing hearing  She does have ENT established  She doesn't want to try PT at this time                  Total time of encounter:  30 min  More than 50% of the time was used in counseling and/or coordination of care  Extent of counseling and/or coordination of care        MD [facility]  [address]  [address]  [phone]

## 2021-07-12 ENCOUNTER — OFFICE VISIT (OUTPATIENT)
Dept: INTERNAL MEDICINE CLINIC | Facility: CLINIC | Age: 81
End: 2021-07-12
Payer: MEDICARE

## 2021-07-12 VITALS
TEMPERATURE: 96.9 F | OXYGEN SATURATION: 96 % | HEIGHT: 65 IN | DIASTOLIC BLOOD PRESSURE: 80 MMHG | WEIGHT: 224.6 LBS | BODY MASS INDEX: 37.42 KG/M2 | SYSTOLIC BLOOD PRESSURE: 120 MMHG | HEART RATE: 86 BPM

## 2021-07-12 DIAGNOSIS — J30.2 SEASONAL ALLERGIES: ICD-10-CM

## 2021-07-12 DIAGNOSIS — G44.89 OTHER HEADACHE SYNDROME: ICD-10-CM

## 2021-07-12 DIAGNOSIS — E11.42 DIABETIC POLYNEUROPATHY ASSOCIATED WITH TYPE 2 DIABETES MELLITUS (HCC): ICD-10-CM

## 2021-07-12 DIAGNOSIS — R26.2 DIFFICULTY WALKING: Primary | ICD-10-CM

## 2021-07-12 DIAGNOSIS — H50.10 EXOTROPIA OF RIGHT EYE: ICD-10-CM

## 2021-07-12 DIAGNOSIS — Z00.00 HEALTH MAINTENANCE EXAMINATION: ICD-10-CM

## 2021-07-12 PROBLEM — H50.111 EXOTROPIA OF RIGHT EYE: Status: ACTIVE | Noted: 2021-07-12

## 2021-07-12 PROCEDURE — 99214 OFFICE O/P EST MOD 30 MIN: CPT | Performed by: INTERNAL MEDICINE

## 2021-07-12 PROCEDURE — G0439 PPPS, SUBSEQ VISIT: HCPCS | Performed by: INTERNAL MEDICINE

## 2021-07-12 PROCEDURE — 1123F ACP DISCUSS/DSCN MKR DOCD: CPT | Performed by: INTERNAL MEDICINE

## 2021-07-12 RX ORDER — FLUTICASONE PROPIONATE 50 MCG
1 SPRAY, SUSPENSION (ML) NASAL DAILY
Qty: 16 G | Refills: 1 | Status: SHIPPED | OUTPATIENT
Start: 2021-07-12 | End: 2021-09-03

## 2021-07-12 NOTE — PROGRESS NOTES
Assessment and Plan:     Problem List Items Addressed This Visit        Endocrine    Diabetic polyneuropathy associated with type 2 diabetes mellitus ([facility] [address] )    Relevant Orders    Microalbumin / creatinine urine ratio       Other    Difficulty walking - Primary     Balance affected, suspect due to eye symptoms  Recommend to use cane / walker at all times  Exotropia of right eye     Recommend to call Ophtha for prism / new prescription  Seasonal allergies     Start steroid nasal spray, may continue with saline spray daily  Relevant Medications    fluticasone (FLONASE) 50 mcg/act nasal spray      Other Visit Diagnoses     Other headache syndrome        Suspect due to eye symptoms  May take Tylenol prn, follow up wt Opha  Health maintenance examination        Schedule mammogram, bone density  ? COVID vaccine  BMI Counseling: Body mass index is 37 96 kg/m²  The BMI is above normal  Nutrition recommendations include encouraging healthy choices of fruits and vegetables and moderation in carbohydrate intake  Exercise recommendations include exercising 3-5 times per week  Preventive health issues were discussed with patient, and age appropriate screening tests were ordered as noted in patient's After Visit Summary  Personalized health advice and appropriate referrals for health education or preventive services given if needed, as noted in patient's After Visit Summary       History of Present Illness:     Patient presents for Medicare Annual Wellness visit    Patient Care Team:  Rehan Gibson MD as PCP - MD Trish Dickey, MD Rehan Friedamn MD Adolfo Odea, MD Francina Bottom, MD Darleen Render, MD as Endoscopist     Problem List:     Patient Active Problem List   Diagnosis    Hiatal hernia    Atrial fibrillation ([facility] [address] ) [I48 [id number]]    Acquired deformity of foot    Corns    Diabetic polyneuropathy associated with type 2 diabetes mellitus (HCC)    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HCC)    Radiculopathy of lumbar region    Primary osteoarthritis of both knees    Sensory polyneuropathy    RLS (restless legs syndrome)    Arthritis    Essential hypertension    Multiple lung nodules    Severe obesity (BMI 35 0-39  9) with comorbidity ([facility] [address] )    Osteopenia of multiple sites    Peripheral neuropathy    Vitamin D deficiency    Dense breast tissue on mammogram    Difficulty walking    Flat foot    Onychomycosis    Carrero's esophagus with dysplasia    Mild cognitive impairment    Pacemaker    GERD (gastroesophageal reflux disease)    Prediabetes    Pain in both feet    Chronic edema    Deep venous insufficiency    Colon polyps    Lichen sclerosus et atrophicus of the vulva    Status post total knee replacement using cement, left    Leg swelling    Seasonal allergies    Exotropia of right eye      Past Medical and Surgical History:     Past Medical History:   Diagnosis Date    Atrial fibrillation ([facility] [address] )     Carrero's esophagus     last assessed: 1/23/2018    BRCA1 negative     BRCA2 negative     [facility] ) 2006    stage 1 (left), given adjuvant radiation with Arimidex x 5 years    Cancer [facility]) 2006    Left Breast, Lumpectomy    Cataract     last assessed: 3/11/2014    Dysplasia of toenail     last assessed: 8/29/2017    Esophageal reflux     GERD (gastroesophageal reflux disease)     Gross hematuria     last assessed: 2/19/2015    Hematuria     Hiatal hernia     History of radiation therapy     Hypertension     Irregular heart beat     AFIB    Mixed sensory-motor polyneuropathy     Neuropathy     Obesity     Pacemaker     Paroxysmal atrial fibrillation (HCC)     Peripheral neuropathy     Rectal bleeding     Restless leg syndrome     Shortness of breath     last assessed: 1/11/2016     Past Surgical History:   Procedure Laterality Date    BREAST BIOPSY Left 2006    BREAST LUMPECTOMY Left 2006    onset: 2006    BREAST SURGERY      CARDIAC PACEMAKER PLACEMENT      x 3 2006    CATARACT EXTRACTION      COLONOSCOPY      CYSTOSCOPY  04/04/2014    diagnostic    HYSTERECTOMY      ALISON BSO; due to fibroid uterus; age 38    KNEE CARTILAGE SURGERY      excision lesion of meniscus or capsule knee    KNEE SURGERY      OOPHORECTOMY Bilateral     OTHER SURGICAL HISTORY      radiation therapy    IL COLONOSCOPY FLX DX W/COLLJ SPEC WHEN PFRMD N/A 2/8/2017    Procedure: COLONOSCOPY;  Surgeon: Natalie Gandhi MD;  [facility]; Service: Gastroenterology    IL ESOPHAGOGASTRODUODENOSCOPY TRANSORAL DIAGNOSTIC N/A 9/20/2017    Procedure: ESOPHAGOGASTRODUODENOSCOPY (EGD); Surgeon: Debbie Araujo MD;  Location: [facility];   Service: Gastroenterology    IL TOTAL KNEE ARTHROPLASTY Left 8/17/2020    Procedure: ARTHROPLASTY KNEE TOTAL;  Surgeon: Diana Amador DO;  Location: [facility];  Service: Orthopedics    UPPER GASTROINTESTINAL ENDOSCOPY        Family History:     Family History   Problem Relation Age of Onset    Hypertension Mother     Heart disease Father     Aneurysm Father     Coronary artery disease Father         in his 76s with aneurysm    Aortic aneurysm Father         abdominal    Scleroderma Sister     Breast cancer Sister 76    Hypertension Sister     Cancer Sister     No Known Problems Son     No Known Problems Son     Testicular cancer Son 39    Thyroid cancer Son 45    Colon cancer Maternal Aunt     Colon cancer Maternal Aunt     Breast cancer Other 48        kaylee's daughter    Alcohol abuse Neg Hx     Substance Abuse Neg Hx     Mental illness Neg Hx     Depression Neg Hx       Social History:     Social History     Socioeconomic History    Marital status: /Civil Union     Spouse name: None    Number of children: None    Years of education: None    Highest education level: None   Occupational History    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      Comment: retired   Tobacco Use    Smoking status: Former Smoker     Packs/day: 1 00     Years: 25 00     Pack years: 25 00     Types: Cigarettes     Quit date: 1980     Years since quittin 8    Smokeless tobacco: Never Used    Tobacco comment: Quit over 30 years ago; quit age 41   Vaping Use    Vaping Use: Never used   Substance and Sexual Activity    Alcohol use: Not Currently    Drug use: No    Sexual activity: None   Other Topics Concern    None   Social History Narrative         Social Determinants of Health     Financial Resource Strain:     Difficulty of Paying Living Expenses:    Food Insecurity:     Worried About Running Out of Food in the Last Year:     [address] in the Last Year:    Transportation Needs:     Lack of Transportation (Medical):      Lack of Transportation (Non-Medical):    Physical Activity:     Days of Exercise per Week:     Minutes of Exercise per Session:    Stress:     Feeling of Stress :    Social Connections:     Frequency of Communication with Friends and Family:     Frequency of Social Gatherings with Friends and Family:     Attends [employer] Services:     Active Member of Clubs or Organizations:     Attends Club or Organization Meetings:     Marital Status:    Intimate Partner Violence:     Fear of Current or Ex-Partner:     Emotionally Abused:     Physically Abused:     Sexually Abused:       Medications and Allergies:     Current Outpatient Medications   Medication Sig Dispense Refill    acetaminophen (TYLENOL) 650 mg CR tablet Take 1,300 mg by mouth daily      Calcium Acetate, Phos Binder, (CALCIUM ACETATE PO) Take by mouth daily        clobetasol (TEMOVATE) 0 05 % ointment Apply once weekly to the affected area 30 g 3    famotidine (PEPCID) 40 MG tablet TAKE 1 TABLET BY MOUTH EVERY DAY 90 tablet 1    fluticasone (FLONASE) 50 mcg/act nasal spray 1 spray into each nostril daily 16 g 1    furosemide (LASIX) 40 mg tablet TAKE 1 TABLET BY MOUTH EVERY DAY (Patient taking differently: 40 mg ) 90 tablet 3    gabapentin (NEURONTIN) 800 mg tablet Take 1 tablet (800 mg total) by mouth 4 (four) times a day 360 tablet 1    loratadine (CLARITIN) 10 mg tablet Take 10 mg by mouth daily      metoprolol tartrate (LOPRESSOR) 50 mg tablet TAKE 1 5 TABLETS (75 MG TOTAL) BY MOUTH 2 (TWO) TIMES A DAY (Patient taking differently: Patient takes 1 tab bid) 270 tablet 2    multivitamin (THERAGRAN) TABS Take 1 tablet by mouth daily      pramipexole (MIRAPEX) 0 5 mg tablet 2 FULL TABLETS TWICE A DAY AT 5:00 P  M  AND 10:00 P M  360 tablet 1    sodium chloride (OCEAN) 0 65 % nasal spray 1 spray into each nostril 2 (two) times a day as needed for rhinitis 60 mL 1    warfarin (COUMADIN) 7 5 mg tablet Take 1 tablet (7 5 mg total) by mouth daily 90 tablet 3     No current facility-administered medications for this visit       Allergies   Allergen Reactions    Cephalexin Rash    Duloxetine Hcl Other (See Comments)     Facial pins and needles sensation    Erythromycin Rash    Levofloxacin Other (See Comments)     Muscular aches    Penicillins Rash    Savella [Milnacipran] Rash    Sulfa Antibiotics Rash      Immunizations:     Immunization History   Administered Date(s) Administered    Influenza Split High Dose Preservative Free IM 09/29/2016, 09/11/2017    Influenza, high dose seasonal 0 7 mL 09/14/2018, 10/10/2020    Pneumococcal Conjugate 13-Valent 11/11/2015    Pneumococcal Polysaccharide PPV23 1940, 11/29/2018    Td (adult), adsorbed 09/01/1990    Zoster 01/01/2012    influenza, trivalent, adjuvanted 09/12/2019      Health Maintenance:         Topic Date Due    Hepatitis C Screening  Never done    Colorectal Cancer Screening  08/12/2023         Topic Date Due    Influenza Vaccine (1) 09/01/2021      Medicare Health Risk Assessment:     /80   Pulse 86   Temp (!) 96 9 °F (36 1 °C)   Ht 5' 4 5" (1 638 m)   Wt 102 kg (224 lb 9 6 oz)   SpO2 96%   BMI 37 96 kg/m²      Jacinta De Leon is here for her Subsequent Wellness visit  Last Medicare Wellness visit information reviewed, patient interviewed and updates made to the record today  Health Risk Assessment:   Patient rates overall health as fair  Patient feels that their physical health rating is same  Patient is satisfied with their life  Eyesight was rated as slightly worse  Hearing was rated as slightly worse  Patient feels that their emotional and mental health rating is same  Patients states they are never, rarely angry  Patient states they are sometimes unusually tired/fatigued  Pain experienced in the last 7 days has been none  Patient states that she has experienced no weight loss or gain in last 6 months  Depression Screening:   PHQ-2 Score: 0      Fall Risk Screening: In the past year, patient has experienced: history of falling in past year    Number of falls: 1  Injured during fall?: No    Feels unsteady when standing or walking?: Yes    Worried about falling?: Yes      Urinary Incontinence Screening:   Patient has leaked urine accidently in the last six months  Home Safety:  Patient does not have trouble with stairs inside or outside of their home  Patient has working smoke alarms and has working carbon monoxide detector  Home safety hazards include: none  Nutrition:   Current diet is Regular  Medications:   Patient is currently taking over-the-counter supplements  OTC medications include: see medication list  Patient is able to manage medications  Activities of Daily Living (ADLs)/Instrumental Activities of Daily Living (IADLs):   Walk and transfer into and out of bed and chair?: Yes  Dress and groom yourself?: Yes    Bathe or shower yourself?: Yes    Feed yourself?  Yes  Do your laundry/housekeeping?: Yes  Manage your money, pay your bills and track your expenses?: Yes  Make your own meals?: Yes    Do your own shopping?: Yes    Advance Care Planning:   Living will: Yes    Durable POA for healthcare: Yes    Advanced directive: Yes    End of Life Decisions reviewed with patient: No      Cognitive Screening:   Provider or family/friend/caregiver concerned regarding cognition?: No    PREVENTIVE SCREENINGS      Cardiovascular Screening:    General: Screening Current      Diabetes Screening:     General: History Diabetes and Screening Current      Colorectal Cancer Screening:     General: Screening Current      Breast Cancer Screening:     General: History Breast Cancer    Due for: Mammogram        Cervical Cancer Screening:    General: Screening Not Indicated      Osteoporosis Screening:      Due for: DXA Appendicular      Abdominal Aortic Aneurysm (AAA) Screening:        General: Screening Not Indicated      Lung Cancer Screening:     General: Screening Not Indicated    Screening, Brief Intervention, and Referral to Treatment (SBIRT)    Screening  Typical number of drinks in a day: 0  Typical number of drinks in a week: 0  Interpretation: Low risk drinking behavior  Single Item Drug Screening:  How often have you used an illegal drug (including marijuana) or a prescription medication for non-medical reasons in the past year? never    Single Item Drug Screen Score: 0  Interpretation: Negative screen for possible drug use disorder    Other Counseling Topics:   Regular weightbearing exercise         Joaquin Ashby MD

## 2021-07-12 NOTE — PATIENT INSTRUCTIONS
Use Flonase daily  If with nose bleeds, use every other day  You may continue using the saline spray daily  If no improvement, call your ENT  Take deep breaths several times a day  Call your eye doctor regarding your right eye, if you need a new prescription or prism

## 2021-07-12 NOTE — PROGRESS NOTES
Assessment/Plan:    Exotropia of right eye  Recommend to call [facility] for prism / new prescription  Difficulty walking  Balance affected, suspect due to eye symptoms  Recommend to use cane / walker at all times  Peripheral neuropathy  On gabapentin  Seasonal allergies  Start steroid nasal spray, may continue with saline spray daily  Diagnoses and all orders for this visit:    Difficulty walking    Seasonal allergies  -     fluticasone (FLONASE) 50 mcg/act nasal spray; 1 spray into each nostril daily    Exotropia of right eye    Other headache syndrome  Comments:  Suspect due to eye symptoms  May take Tylenol prn, follow up wt Ophtha  Health maintenance examination  Comments:  Schedule mammogram, bone density  ? COVID vaccine  Diabetic polyneuropathy associated with type 2 diabetes mellitus (HCC)  -     Microalbumin / creatinine urine ratio      Follow up as scheduled or as needed  Subjective:      Patient ID: Shae Erwin is a 68 y o  female c/o headache and balance issues  She complains a daily headache for the past few weeks  Headaches would occur on the left or right side, sometimes at the top of her head  Pain is tolerable, non radiating  She also complains of frequent nasal congestion and postnasal drip  She has been using the saline spray daily, initially at work but feels it has not recently  She has an occasional cough productive of clear sputum  Denies any chest pain or shortness of breath, no wheezing  She also complains of blurring in her right eye  She did see her eye doctor recently, no new prescriptions recommended  She reports her balance feels off  She went to the movies over the weekend and got dizzy looking at the pattern carpet  She does use a cane occasionally, denies any recent falls  She did to the gym last week and felt her balance was off after working out      The following portions of the patient's history were reviewed and updated as appropriate: allergies, current medications, past medical history, past social history and problem list     Review of Systems   Constitutional: Negative for activity change  HENT: Positive for congestion, postnasal drip and rhinorrhea  Negative for sinus pressure and sinus pain  Eyes: Positive for visual disturbance  Negative for pain, redness and itching  Respiratory: Positive for cough  Negative for shortness of breath  Cardiovascular: Negative for chest pain and palpitations  Gastrointestinal: Negative for nausea  Musculoskeletal: Positive for gait problem  Neurological: Negative for dizziness and light-headedness  Psychiatric/Behavioral: Negative for agitation and behavioral problems  Objective:      /80   Pulse 86   Temp (!) 96 9 °F (36 1 °C)   Ht 5' 4 5" (1 638 m)   Wt 102 kg (224 lb 9 6 oz)   SpO2 96%   BMI 37 96 kg/m²          Physical Exam  Constitutional:       Appearance: Normal appearance  HENT:      Head: Normocephalic and atraumatic  Right Ear: Tympanic membrane, ear canal and external ear normal       Left Ear: Tympanic membrane, ear canal and external ear normal       Mouth/Throat:      Mouth: Mucous membranes are moist    Eyes:      Pupils: Pupils are equal, round, and reactive to light  Cardiovascular:      Rate and Rhythm: Normal rate and regular rhythm  Pulmonary:      Effort: Pulmonary effort is normal  No respiratory distress  Breath sounds: Examination of the left-lower field reveals rales  Rales present  No decreased breath sounds or wheezing  Neurological:      General: No focal deficit present  Mental Status: She is alert and oriented to person, place, and time     Psychiatric:         Mood and Affect: Mood normal          Behavior: Behavior normal

## 2021-07-17 DIAGNOSIS — I48.19 PERSISTENT ATRIAL FIBRILLATION (HCC): ICD-10-CM

## 2021-07-17 RX ORDER — WARFARIN SODIUM 7.5 MG/1
TABLET ORAL
Qty: 90 TABLET | Refills: 3 | Status: SHIPPED | OUTPATIENT
Start: 2021-07-17 | End: 2022-07-13

## 2021-07-26 ENCOUNTER — OFFICE VISIT (OUTPATIENT)
Dept: PODIATRY | Facility: CLINIC | Age: 81
End: 2021-07-26
Payer: MEDICARE

## 2021-07-26 VITALS — RESPIRATION RATE: 17 BRPM | HEIGHT: 65 IN | BODY MASS INDEX: 37.32 KG/M2 | WEIGHT: 224 LBS

## 2021-07-26 DIAGNOSIS — L03.031 PARONYCHIA OF TOENAIL OF RIGHT FOOT: ICD-10-CM

## 2021-07-26 DIAGNOSIS — E11.51 TYPE 2 DIABETES MELLITUS WITH DIABETIC PERIPHERAL ANGIOPATHY WITHOUT GANGRENE, WITHOUT LONG-TERM CURRENT USE OF INSULIN (HCC): Primary | ICD-10-CM

## 2021-07-26 DIAGNOSIS — L84 CORNS: ICD-10-CM

## 2021-07-26 DIAGNOSIS — I70.209 PERIPHERAL ARTERIOSCLEROSIS (HCC): ICD-10-CM

## 2021-07-26 PROCEDURE — 11056 PARNG/CUTG B9 HYPRKR LES 2-4: CPT | Performed by: PODIATRIST

## 2021-07-29 ENCOUNTER — REMOTE DEVICE CLINIC VISIT (OUTPATIENT)
Dept: CARDIOLOGY CLINIC | Facility: CLINIC | Age: 81
End: 2021-07-29
Payer: MEDICARE

## 2021-07-29 ENCOUNTER — TELEPHONE (OUTPATIENT)
Dept: INTERNAL MEDICINE CLINIC | Facility: CLINIC | Age: 81
End: 2021-07-29

## 2021-07-29 DIAGNOSIS — Z95.0 CARDIAC PACEMAKER IN SITU: Primary | ICD-10-CM

## 2021-07-29 DIAGNOSIS — J30.2 SEASONAL ALLERGIES: ICD-10-CM

## 2021-07-29 DIAGNOSIS — R05.9 COUGH: Primary | ICD-10-CM

## 2021-07-29 PROCEDURE — 93294 REM INTERROG EVL PM/LDLS PM: CPT | Performed by: INTERNAL MEDICINE

## 2021-07-29 PROCEDURE — 93296 REM INTERROG EVL PM/IDS: CPT | Performed by: INTERNAL MEDICINE

## 2021-07-29 RX ORDER — DEXAMETHASONE 4 MG/1
2 TABLET ORAL 2 TIMES DAILY
Qty: 12 G | Refills: 0 | Status: SHIPPED | OUTPATIENT
Start: 2021-07-29 | End: 2021-08-23

## 2021-07-29 NOTE — TELEPHONE ENCOUNTER
Chest x-ray ordered, can do anytime  Are you able to cough out any sputum? If so, we can try Mucinex twice a day  If not, we can try a steroid inhaler  Let me know

## 2021-07-29 NOTE — TELEPHONE ENCOUNTER
She would like to have the chest x-ray done that was talked about at her last visit  She is not really feeling any better  She does hear rattling in her chest   She is still using her nasal   She said you told her to call back in two weeks to get the x-ray done if not feeling better

## 2021-07-29 NOTE — TELEPHONE ENCOUNTER
Spoke w/ pt  And adv'd  She can't cough up any sputum-adv'd her that Dr Newton Ibarra will send inaler  Send to [facility] in [place]  No need to call pt  Back

## 2021-07-30 ENCOUNTER — OFFICE VISIT (OUTPATIENT)
Dept: OBGYN CLINIC | Facility: CLINIC | Age: 81
End: 2021-07-30
Payer: MEDICARE

## 2021-07-30 VITALS
HEART RATE: 96 BPM | BODY MASS INDEX: 38.32 KG/M2 | WEIGHT: 230 LBS | HEIGHT: 65 IN | SYSTOLIC BLOOD PRESSURE: 102 MMHG | DIASTOLIC BLOOD PRESSURE: 69 MMHG

## 2021-07-30 DIAGNOSIS — M17.11 PRIMARY OSTEOARTHRITIS OF RIGHT KNEE: Primary | ICD-10-CM

## 2021-07-30 PROCEDURE — 99214 OFFICE O/P EST MOD 30 MIN: CPT | Performed by: ORTHOPAEDIC SURGERY

## 2021-07-30 PROCEDURE — 20610 DRAIN/INJ JOINT/BURSA W/O US: CPT | Performed by: ORTHOPAEDIC SURGERY

## 2021-07-30 RX ORDER — TRIAMCINOLONE ACETONIDE 40 MG/ML
80 INJECTION, SUSPENSION INTRA-ARTICULAR; INTRAMUSCULAR
Status: COMPLETED | OUTPATIENT
Start: 2021-07-30 | End: 2021-07-30

## 2021-07-30 RX ORDER — BUPIVACAINE HYDROCHLORIDE 5 MG/ML
6 INJECTION, SOLUTION EPIDURAL; INTRACAUDAL
Status: COMPLETED | OUTPATIENT
Start: 2021-07-30 | End: 2021-07-30

## 2021-07-30 RX ADMIN — TRIAMCINOLONE ACETONIDE 80 MG: 40 INJECTION, SUSPENSION INTRA-ARTICULAR; INTRAMUSCULAR at 17:03

## 2021-07-30 RX ADMIN — BUPIVACAINE HYDROCHLORIDE 6 ML: 5 INJECTION, SOLUTION EPIDURAL; INTRACAUDAL at 17:03

## 2021-07-30 NOTE — PROGRESS NOTES
Assessment/Plan:  1  Primary osteoarthritis of right knee  XR knee 3 vw left non injury     Right knee DJD    Corticosteroid injection performed today into the right knee  Activities as tolerated with modification to avoid pain  Tylenol as needed  Follow-up as needed  Corticosteroid injections may be repeated every three months  Large joint arthrocentesis: R knee  Universal Protocol:  Patient understanding: patient states understanding of the procedure being performed  Patient identity confirmed: verbally with patient    Supporting Documentation  Indications: pain   Procedure Details  Location: knee - R knee  Needle size: 20 G  Ultrasound guidance: no  Approach: anterolateral  Medications administered: 80 mg triamcinolone acetonide 40 mg/mL; 6 mL bupivacaine (PF) 0 5 %    Patient tolerance: patient tolerated the procedure well with no immediate complications  Dressing:  Sterile dressing applied        Subjective: Follow-up right knee pain    Patient ID: Gonzalo Titus is a 68 y o  female  HPI  70-year-old female returns to the office today for follow-up regarding her right knee DJD  She notes worsening pain recently  She notes generalized pain with associated swelling and stiffness  Pain is worse with prolonged standing and walking  She takes Tylenol arthritis with mild relief  She denies any weakness or instability  No numbness or tingling  No fevers or chills  She is interested in a corticosteroid injection into the right knee today  Review of Systems   Constitutional: Positive for activity change  Negative for chills, fever and unexpected weight change  HENT: Negative for hearing loss, nosebleeds and sore throat  Eyes: Negative for pain, redness and visual disturbance  Respiratory: Negative for cough, shortness of breath and wheezing  Cardiovascular: Negative for chest pain, palpitations and leg swelling     Gastrointestinal: Negative for abdominal pain, nausea and vomiting  Endocrine: Negative for polydipsia and polyuria  Genitourinary: Negative for dysuria and hematuria  Musculoskeletal: Positive for arthralgias  See HPI   Skin: Negative for rash and wound  Neurological: Negative for dizziness, numbness and headaches  Psychiatric/Behavioral: Negative for decreased concentration and suicidal ideas  The patient is not nervous/anxious            Past Medical History:   Diagnosis Date    Atrial fibrillation ([facility] [address] )     Carrero's esophagus     last assessed: 1/23/2018    BRCA1 negative     BRCA2 negative     Breast cancer ([facility] [address] ) 2006    stage 1 (left), given adjuvant radiation with Arimidex x 5 years    Cancer [facility]) 2006    Left Breast, Lumpectomy    Cataract     last assessed: 3/11/2014    Dysplasia of toenail     last assessed: 8/29/2017    Esophageal reflux     GERD (gastroesophageal reflux disease)     Gross hematuria     last assessed: 2/19/2015    Hematuria     Hiatal hernia     History of radiation therapy     Hypertension     Irregular heart beat     AFIB    Mixed sensory-motor polyneuropathy     Neuropathy     Obesity     Pacemaker     Paroxysmal atrial fibrillation (HCC)     Peripheral neuropathy     Rectal bleeding     Restless leg syndrome     Shortness of breath     last assessed: 1/11/2016       Past Surgical History:   Procedure Laterality Date    BREAST BIOPSY Left 2006    BREAST LUMPECTOMY Left 2006    onset: 2006    BREAST SURGERY      CARDIAC PACEMAKER PLACEMENT      x 3 2006    CATARACT EXTRACTION      COLONOSCOPY      CYSTOSCOPY  04/04/2014    diagnostic    HYSTERECTOMY      ALISON BSO; due to fibroid uterus; age 38   [facility] KNEE CARTILAGE SURGERY      excision lesion of meniscus or capsule knee    KNEE SURGERY      OOPHORECTOMY Bilateral     OTHER SURGICAL HISTORY      radiation therapy    DE COLONOSCOPY FLX DX W/COLLJ SPEC WHEN PFRMD N/A 2/8/2017    Procedure: COLONOSCOPY;  Surgeon: Sheryle Congress, MD;  [facility];  Service: Gastroenterology    AK ESOPHAGOGASTRODUODENOSCOPY TRANSORAL DIAGNOSTIC N/A 2017    Procedure: ESOPHAGOGASTRODUODENOSCOPY (EGD); Surgeon: Ashley Soria MD;  Location:  GI LAB;   Service: Gastroenterology    AK TOTAL KNEE ARTHROPLASTY Left 2020    Procedure: ARTHROPLASTY KNEE TOTAL;  Surgeon: Lynda Jolly DO;  Location: [address] [place] [address];  Service: Orthopedics    UPPER GASTROINTESTINAL ENDOSCOPY         Family History   Problem Relation Age of Onset    Hypertension Mother     Heart disease Father     Aneurysm Father     Coronary artery disease Father         in his 76s with aneurysm    Aortic aneurysm Father         abdominal    Scleroderma Sister     Breast cancer Sister 76    Hypertension Sister     Cancer Sister     No Known Problems Son     No Known Problems Son     Testicular cancer Son 39    Thyroid cancer Son 45    Colon cancer Maternal Aunt     Colon cancer Maternal Aunt     Breast cancer Other 48        kaylee's daughter    Alcohol abuse Neg Hx     Substance Abuse Neg Hx     Mental illness Neg Hx     Depression Neg Hx        Social History     Occupational History    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      Comment: retired   Tobacco Use    Smoking status: Former Smoker     Packs/day: 1 00     Years: 25 00     Pack years: 25 00     Types: Cigarettes     Quit date: 1980     Years since quittin 8    Smokeless tobacco: Never Used    Tobacco comment: Quit over 30 years ago; quit age 41   Vaping Use    Vaping Use: Never used   Substance and Sexual Activity    Alcohol use: Not Currently    Drug use: No    Sexual activity: Not on file         Current Outpatient Medications:     acetaminophen (TYLENOL) 650 mg CR tablet, Take 1,300 mg by mouth daily, Disp: , Rfl:     Calcium Acetate, Phos Binder, (CALCIUM ACETATE PO), Take by mouth daily  , Disp: , Rfl:     clobetasol (TEMOVATE) 0 05 % ointment, Apply once weekly to the affected area, Disp: 30 g, Rfl: 3    famotidine (PEPCID) 40 MG tablet, TAKE 1 TABLET BY MOUTH EVERY DAY, Disp: 90 tablet, Rfl: 1    fluticasone (FLONASE) 50 mcg/act nasal spray, 1 spray into each nostril daily, Disp: 16 g, Rfl: 1    fluticasone (Flovent HFA) 110 MCG/ACT inhaler, Inhale 2 puffs 2 (two) times a day Rinse mouth after use , Disp: 12 g, Rfl: 0    furosemide (LASIX) 40 mg tablet, TAKE 1 TABLET BY MOUTH EVERY DAY (Patient taking differently: 40 mg ), Disp: 90 tablet, Rfl: 3    gabapentin (NEURONTIN) 800 mg tablet, Take 1 tablet (800 mg total) by mouth 4 (four) times a day, Disp: 360 tablet, Rfl: 1    loratadine (CLARITIN) 10 mg tablet, Take 10 mg by mouth daily, Disp: , Rfl:     metoprolol tartrate (LOPRESSOR) 50 mg tablet, TAKE 1 5 TABLETS (75 MG TOTAL) BY MOUTH 2 (TWO) TIMES A DAY (Patient taking differently: Patient takes 1 tab bid), Disp: 270 tablet, Rfl: 2    multivitamin (THERAGRAN) TABS, Take 1 tablet by mouth daily, Disp: , Rfl:     pramipexole (MIRAPEX) 0 5 mg tablet, 2 FULL TABLETS TWICE A DAY AT 5:00 P  M  AND 10:00 P M , Disp: 360 tablet, Rfl: 1    sodium chloride (OCEAN) 0 65 % nasal spray, 1 spray into each nostril 2 (two) times a day as needed for rhinitis, Disp: 60 mL, Rfl: 1    warfarin (COUMADIN) 7 5 mg tablet, TAKE 1 TABLET BY MOUTH EVERY DAY, Disp: 90 tablet, Rfl: 3    Allergies   Allergen Reactions    Cephalexin Rash    Duloxetine Hcl Other (See Comments)     Facial pins and needles sensation    Erythromycin Rash    Levofloxacin Other (See Comments)     Muscular aches    Penicillins Rash    Savella [Milnacipran] Rash    Sulfa Antibiotics Rash       Objective:  Vitals:    07/30/21 1144   BP: 102/69   Pulse: 96       Body mass index is 38 87 kg/m²  Right Knee Exam     Tenderness   Right knee tenderness location: no joint line tenderness      Range of Motion   Extension: 0   Flexion: 120     Tests   Ned:  Medial - negative Lateral - negative  Varus: negative Valgus: positive  Lachman:  Anterior - negative        Other   Erythema: absent  Sensation: normal  Swelling: none  Effusion: no effusion present          Observations     Right Knee   Negative for effusion  Physical Exam  Vitals and nursing note reviewed  Constitutional:       Appearance: Normal appearance  HENT:      Head: Normocephalic  Eyes:      Extraocular Movements: Extraocular movements intact  Cardiovascular:      Pulses: Normal pulses  Pulmonary:      Effort: Pulmonary effort is normal    Musculoskeletal:      Cervical back: Neck supple  Right knee: No effusion  Instability Tests: Medial Ned test negative and lateral Ned test negative  Skin:     General: Skin is warm and dry  Capillary Refill: Capillary refill takes less than 2 seconds  Neurological:      General: No focal deficit present  Mental Status: She is alert     Psychiatric:         Mood and Affect: Mood normal          Behavior: Behavior normal

## 2021-08-02 DIAGNOSIS — G25.81 RLS (RESTLESS LEGS SYNDROME): ICD-10-CM

## 2021-08-02 RX ORDER — PRAMIPEXOLE DIHYDROCHLORIDE 0.5 MG/1
TABLET ORAL
Qty: 360 TABLET | Refills: 1 | Status: SHIPPED | OUTPATIENT
Start: 2021-08-02 | End: 2022-02-07

## 2021-08-04 ENCOUNTER — ANTICOAG VISIT (OUTPATIENT)
Dept: CARDIOLOGY CLINIC | Facility: CLINIC | Age: 81
End: 2021-08-04

## 2021-08-04 ENCOUNTER — APPOINTMENT (OUTPATIENT)
Dept: LAB | Facility: HOSPITAL | Age: 81
End: 2021-08-04
Payer: MEDICARE

## 2021-08-19 ENCOUNTER — TELEPHONE (OUTPATIENT)
Dept: CARDIOLOGY CLINIC | Facility: CLINIC | Age: 81
End: 2021-08-19

## 2021-08-19 NOTE — TELEPHONE ENCOUNTER
Dr Tammy Tidwell pt:  Pt experiencing muscle cramping and asking if there are any cardiac contraindications to taking Magnesium?

## 2021-08-23 DIAGNOSIS — J30.2 SEASONAL ALLERGIES: ICD-10-CM

## 2021-08-23 DIAGNOSIS — R05.9 COUGH: ICD-10-CM

## 2021-08-23 RX ORDER — DEXAMETHASONE 4 MG/1
2 TABLET ORAL 2 TIMES DAILY
Qty: 12 G | Refills: 1 | Status: SHIPPED | OUTPATIENT
Start: 2021-08-23 | End: 2022-06-07

## 2021-09-02 ENCOUNTER — APPOINTMENT (OUTPATIENT)
Dept: LAB | Facility: HOSPITAL | Age: 81
End: 2021-09-02
Payer: MEDICARE

## 2021-09-02 ENCOUNTER — ANTICOAG VISIT (OUTPATIENT)
Dept: CARDIOLOGY CLINIC | Facility: CLINIC | Age: 81
End: 2021-09-02

## 2021-09-02 LAB
CREAT UR-MCNC: 71.8 MG/DL
MICROALBUMIN UR-MCNC: 38.5 MG/L (ref 0–20)
MICROALBUMIN/CREAT 24H UR: 54 MG/G CREATININE (ref 0–30)

## 2021-09-02 PROCEDURE — 82043 UR ALBUMIN QUANTITATIVE: CPT | Performed by: INTERNAL MEDICINE

## 2021-09-02 PROCEDURE — 82570 ASSAY OF URINE CREATININE: CPT | Performed by: INTERNAL MEDICINE

## 2021-09-03 ENCOUNTER — TELEPHONE (OUTPATIENT)
Dept: INTERNAL MEDICINE CLINIC | Facility: CLINIC | Age: 81
End: 2021-09-03

## 2021-09-03 DIAGNOSIS — J30.2 SEASONAL ALLERGIES: ICD-10-CM

## 2021-09-03 DIAGNOSIS — E11.42 DIABETIC POLYNEUROPATHY ASSOCIATED WITH TYPE 2 DIABETES MELLITUS (HCC): Primary | ICD-10-CM

## 2021-09-03 RX ORDER — FLUTICASONE PROPIONATE 50 MCG
SPRAY, SUSPENSION (ML) NASAL
Qty: 16 ML | Refills: 1 | Status: SHIPPED | OUTPATIENT
Start: 2021-09-03 | End: 2022-06-20 | Stop reason: SDUPTHER

## 2021-09-03 NOTE — TELEPHONE ENCOUNTER
Your urine showed some sugar / glucose  I would like to start low dose lisinopril (2 5 mg) to help protect your kidneys from the diabetes  Let me know if ok to send to the pharmacy

## 2021-09-07 RX ORDER — LISINOPRIL 2.5 MG/1
2.5 TABLET ORAL DAILY
Qty: 90 TABLET | Refills: 0 | Status: SHIPPED | OUTPATIENT
Start: 2021-09-07 | End: 2021-11-22

## 2021-09-16 ENCOUNTER — APPOINTMENT (OUTPATIENT)
Dept: LAB | Facility: HOSPITAL | Age: 81
End: 2021-09-16
Payer: MEDICARE

## 2021-09-16 ENCOUNTER — ANTICOAG VISIT (OUTPATIENT)
Dept: CARDIOLOGY CLINIC | Facility: CLINIC | Age: 81
End: 2021-09-16

## 2021-09-20 ENCOUNTER — OFFICE VISIT (OUTPATIENT)
Dept: PODIATRY | Facility: CLINIC | Age: 81
End: 2021-09-20
Payer: MEDICARE

## 2021-09-20 VITALS — BODY MASS INDEX: 38.32 KG/M2 | WEIGHT: 230 LBS | HEIGHT: 65 IN | RESPIRATION RATE: 16 BRPM

## 2021-09-20 DIAGNOSIS — M79.671 RIGHT FOOT PAIN: ICD-10-CM

## 2021-09-20 DIAGNOSIS — B07.0 PLANTAR WARTS: Primary | ICD-10-CM

## 2021-09-20 PROCEDURE — 17110 DESTRUCTION B9 LES UP TO 14: CPT | Performed by: PODIATRIST

## 2021-09-20 NOTE — PROGRESS NOTES
Assessment/Plan:    Atypical verruca submetatarsal 1 right foot  Right foot pain  Plan  Lesion debrided  Patient advised on condition  We will try Cantharone  This was applied today  Return 2 weeks  She will bandage daily  Diagnoses and all orders for this visit:    Plantar warts    Right foot pain          Subjective:   Patient has pain in the ball of the right foot  This has been ongoing for several weeks  No history of trauma      Allergies   Allergen Reactions    Cephalexin Rash    Duloxetine Hcl Other (See Comments)     Facial pins and needles sensation    Erythromycin Rash    Levofloxacin Other (See Comments)     Muscular aches    Penicillins Rash    Savella [Milnacipran] Rash    Sulfa Antibiotics Rash         Current Outpatient Medications:     acetaminophen (TYLENOL) 650 mg CR tablet, Take 1,300 mg by mouth daily, Disp: , Rfl:     Calcium Acetate, Phos Binder, (CALCIUM ACETATE PO), Take by mouth daily  , Disp: , Rfl:     clobetasol (TEMOVATE) 0 05 % ointment, Apply once weekly to the affected area, Disp: 30 g, Rfl: 3    famotidine (PEPCID) 40 MG tablet, TAKE 1 TABLET BY MOUTH EVERY DAY, Disp: 90 tablet, Rfl: 1    Flovent  MCG/ACT inhaler, INHALE 2 PUFFS 2 (TWO) TIMES A DAY RINSE MOUTH AFTER USE , Disp: 12 g, Rfl: 1    fluticasone (FLONASE) 50 mcg/act nasal spray, SPRAY 1 SPRAY INTO EACH NOSTRIL EVERY DAY, Disp: 16 mL, Rfl: 1    furosemide (LASIX) 40 mg tablet, TAKE 1 TABLET BY MOUTH EVERY DAY (Patient taking differently: 40 mg ), Disp: 90 tablet, Rfl: 3    gabapentin (NEURONTIN) 800 mg tablet, Take 1 tablet (800 mg total) by mouth 4 (four) times a day, Disp: 360 tablet, Rfl: 1    lisinopril (ZESTRIL) 2 5 mg tablet, Take 1 tablet (2 5 mg total) by mouth daily, Disp: 90 tablet, Rfl: 0    loratadine (CLARITIN) 10 mg tablet, Take 10 mg by mouth daily, Disp: , Rfl:     metoprolol tartrate (LOPRESSOR) 50 mg tablet, TAKE 1 5 TABLETS (75 MG TOTAL) BY MOUTH 2 (TWO) TIMES A DAY (Patient taking differently: Patient takes 1 tab bid), Disp: 270 tablet, Rfl: 2    multivitamin (THERAGRAN) TABS, Take 1 tablet by mouth daily, Disp: , Rfl:     pramipexole (MIRAPEX) 0 5 mg tablet, 2 FULL TABLETS TWICE A DAY AT 5:00 P  M  AND 10:00 P M , Disp: 360 tablet, Rfl: 1    sodium chloride (OCEAN) 0 65 % nasal spray, 1 spray into each nostril 2 (two) times a day as needed for rhinitis, Disp: 60 mL, Rfl: 1    warfarin (COUMADIN) 7 5 mg tablet, TAKE 1 TABLET BY MOUTH EVERY DAY, Disp: 90 tablet, Rfl: 3    Patient Active Problem List   Diagnosis    Hiatal hernia    Atrial fibrillation (HCC) [I48 91]    Acquired deformity of foot    Corns    Diabetic polyneuropathy associated with type 2 diabetes mellitus (HCC)    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HCC)    Radiculopathy of lumbar region    Primary osteoarthritis of both knees    Sensory polyneuropathy    RLS (restless legs syndrome)    Arthritis    Essential hypertension    Multiple lung nodules    Severe obesity (BMI 35 0-39  9) with comorbidity ([address] )    Osteopenia of multiple sites    Peripheral neuropathy    Vitamin D deficiency    Dense breast tissue on mammogram    Difficulty walking    Flat foot    Onychomycosis    Carrero's esophagus with dysplasia    Mild cognitive impairment    Pacemaker    GERD (gastroesophageal reflux disease)    Prediabetes    Pain in both feet    Chronic edema    Deep venous insufficiency    Colon polyps    Lichen sclerosus et atrophicus of the vulva    Status post total knee replacement using cement, left    Leg swelling    Seasonal allergies    Exotropia of right eye          Patient ID: Faith Villarreal is a 68 y o  female  HPI    The following portions of the patient's history were reviewed and updated as appropriate:     family history includes Aneurysm in her father;  Aortic aneurysm in her father; Breast cancer (age of onset: 48) in her other; Breast cancer (age of onset: 76) in her sister; Cancer in her sister; Colon cancer in her maternal aunt and maternal aunt; Coronary artery disease in her father; Heart disease in her father; Hypertension in her mother and sister; No Known Problems in her son and son; Scleroderma in her sister; Testicular cancer (age of onset: 39) in her son; Thyroid cancer (age of onset: 45) in her son  reports that she quit smoking about 41 years ago  Her smoking use included cigarettes  She has a 25 00 pack-year smoking history  She has never used smokeless tobacco  She reports previous alcohol use  She reports that she does not use drugs  Vitals:    09/20/21 1317   Resp: 16       Review of Systems      Objective:  Patient's shoes and socks removed  Foot ExamPhysical Exam  Vitals and nursing note reviewed  Constitutional:       Appearance: Normal appearance  Skin:     Capillary Refill: Capillary refill takes less than 2 seconds  Comments:   Submetatarsal 1 of the right foot demonstrates 0 5 centimeter squared lesion  Debrided  There is pain with lateral compression  This is consistent with atypical verruca  Neurological:      Mental Status: She is alert  Psychiatric:         Mood and Affect: Mood normal          Behavior: Behavior normal          Thought Content:  Thought content normal          Judgment: Judgment normal

## 2021-10-04 ENCOUNTER — APPOINTMENT (OUTPATIENT)
Dept: LAB | Facility: HOSPITAL | Age: 81
End: 2021-10-04
Payer: MEDICARE

## 2021-10-04 ENCOUNTER — ANTICOAG VISIT (OUTPATIENT)
Dept: CARDIOLOGY CLINIC | Facility: CLINIC | Age: 81
End: 2021-10-04

## 2021-10-04 DIAGNOSIS — I48.20 CHRONIC ATRIAL FIBRILLATION (HCC): ICD-10-CM

## 2021-10-04 RX ORDER — METOPROLOL TARTRATE 50 MG/1
75 TABLET, FILM COATED ORAL 2 TIMES DAILY
Qty: 270 TABLET | Refills: 3 | Status: SHIPPED | OUTPATIENT
Start: 2021-10-04 | End: 2022-02-03 | Stop reason: ALTCHOICE

## 2021-10-06 ENCOUNTER — OFFICE VISIT (OUTPATIENT)
Dept: INTERNAL MEDICINE CLINIC | Facility: CLINIC | Age: 81
End: 2021-10-06
Payer: MEDICARE

## 2021-10-06 ENCOUNTER — OFFICE VISIT (OUTPATIENT)
Dept: PODIATRY | Facility: CLINIC | Age: 81
End: 2021-10-06
Payer: MEDICARE

## 2021-10-06 VITALS
HEART RATE: 76 BPM | WEIGHT: 227.6 LBS | TEMPERATURE: 95.3 F | DIASTOLIC BLOOD PRESSURE: 82 MMHG | SYSTOLIC BLOOD PRESSURE: 120 MMHG | BODY MASS INDEX: 37.92 KG/M2 | HEIGHT: 65 IN | OXYGEN SATURATION: 96 %

## 2021-10-06 VITALS — RESPIRATION RATE: 17 BRPM | HEIGHT: 65 IN | BODY MASS INDEX: 37.82 KG/M2 | WEIGHT: 227 LBS

## 2021-10-06 DIAGNOSIS — I70.209 PERIPHERAL ARTERIOSCLEROSIS (HCC): ICD-10-CM

## 2021-10-06 DIAGNOSIS — S91.301A OPEN WOUND OF RIGHT FOOT, INITIAL ENCOUNTER: ICD-10-CM

## 2021-10-06 DIAGNOSIS — E66.01 SEVERE OBESITY (BMI 35.0-39.9) WITH COMORBIDITY (HCC): ICD-10-CM

## 2021-10-06 DIAGNOSIS — I10 ESSENTIAL HYPERTENSION: ICD-10-CM

## 2021-10-06 DIAGNOSIS — R26.2 DIFFICULTY WALKING: Primary | ICD-10-CM

## 2021-10-06 DIAGNOSIS — Z23 NEED FOR INFLUENZA VACCINATION: ICD-10-CM

## 2021-10-06 DIAGNOSIS — I48.20 CHRONIC ATRIAL FIBRILLATION (HCC): ICD-10-CM

## 2021-10-06 DIAGNOSIS — M79.671 PAIN IN BOTH FEET: ICD-10-CM

## 2021-10-06 DIAGNOSIS — E11.51 TYPE 2 DIABETES MELLITUS WITH DIABETIC PERIPHERAL ANGIOPATHY WITHOUT GANGRENE, WITHOUT LONG-TERM CURRENT USE OF INSULIN (HCC): Primary | ICD-10-CM

## 2021-10-06 DIAGNOSIS — R73.03 PREDIABETES: ICD-10-CM

## 2021-10-06 DIAGNOSIS — N18.2 CHRONIC KIDNEY DISEASE (CKD), STAGE II (MILD): ICD-10-CM

## 2021-10-06 DIAGNOSIS — K22.719 BARRETT'S ESOPHAGUS WITH DYSPLASIA: ICD-10-CM

## 2021-10-06 DIAGNOSIS — L84 CORNS: ICD-10-CM

## 2021-10-06 DIAGNOSIS — M79.672 PAIN IN BOTH FEET: ICD-10-CM

## 2021-10-06 DIAGNOSIS — Z71.9 HEALTH COUNSELING: ICD-10-CM

## 2021-10-06 DIAGNOSIS — J30.2 SEASONAL ALLERGIES: ICD-10-CM

## 2021-10-06 PROCEDURE — 90662 IIV NO PRSV INCREASED AG IM: CPT | Performed by: INTERNAL MEDICINE

## 2021-10-06 PROCEDURE — 99212 OFFICE O/P EST SF 10 MIN: CPT | Performed by: PODIATRIST

## 2021-10-06 PROCEDURE — 11056 PARNG/CUTG B9 HYPRKR LES 2-4: CPT | Performed by: PODIATRIST

## 2021-10-06 PROCEDURE — G0008 ADMIN INFLUENZA VIRUS VAC: HCPCS | Performed by: INTERNAL MEDICINE

## 2021-10-06 PROCEDURE — 99214 OFFICE O/P EST MOD 30 MIN: CPT | Performed by: INTERNAL MEDICINE

## 2021-10-06 RX ORDER — MAGNESIUM 30 MG
30 TABLET ORAL DAILY
COMMUNITY

## 2021-10-07 ENCOUNTER — HOSPITAL ENCOUNTER (OUTPATIENT)
Dept: RADIOLOGY | Facility: HOSPITAL | Age: 81
Discharge: HOME | End: 2021-10-07
Payer: MEDICARE

## 2021-10-07 ENCOUNTER — TELEPHONE (OUTPATIENT)
Dept: INTERNAL MEDICINE CLINIC | Facility: CLINIC | Age: 81
End: 2021-10-07

## 2021-10-07 VITALS — WEIGHT: 227 LBS | HEIGHT: 65 IN | BODY MASS INDEX: 37.82 KG/M2

## 2021-10-07 DIAGNOSIS — M85.89 OSTEOPENIA OF MULTIPLE SITES: ICD-10-CM

## 2021-10-07 DIAGNOSIS — Z12.31 ENCOUNTER FOR SCREENING MAMMOGRAM FOR BREAST CANCER: ICD-10-CM

## 2021-10-07 PROCEDURE — 77080 DXA BONE DENSITY AXIAL: CPT

## 2021-10-07 PROCEDURE — 77063 BREAST TOMOSYNTHESIS BI: CPT

## 2021-10-07 PROCEDURE — 77067 SCR MAMMO BI INCL CAD: CPT

## 2021-10-25 ENCOUNTER — APPOINTMENT (OUTPATIENT)
Dept: LAB | Facility: HOSPITAL | Age: 81
End: 2021-10-25
Payer: MEDICARE

## 2021-10-25 ENCOUNTER — ANTICOAG VISIT (OUTPATIENT)
Dept: CARDIOLOGY CLINIC | Facility: CLINIC | Age: 81
End: 2021-10-25

## 2021-10-25 LAB
ALBUMIN SERPL BCP-MCNC: 3.8 G/DL (ref 3.5–5)
ALP SERPL-CCNC: 69 U/L (ref 46–116)
ALT SERPL W P-5'-P-CCNC: 34 U/L (ref 12–78)
ANION GAP SERPL CALCULATED.3IONS-SCNC: 3 MMOL/L (ref 4–13)
AST SERPL W P-5'-P-CCNC: 23 U/L (ref 5–45)
BILIRUB SERPL-MCNC: 0.87 MG/DL (ref 0.2–1)
BUN SERPL-MCNC: 27 MG/DL (ref 5–25)
CALCIUM SERPL-MCNC: 9.1 MG/DL (ref 8.3–10.1)
CHLORIDE SERPL-SCNC: 103 MMOL/L (ref 100–108)
CHOLEST SERPL-MCNC: 139 MG/DL (ref 50–200)
CO2 SERPL-SCNC: 33 MMOL/L (ref 21–32)
CREAT SERPL-MCNC: 0.98 MG/DL (ref 0.6–1.3)
GFR SERPL CREATININE-BSD FRML MDRD: 55 ML/MIN/1.73SQ M
GLUCOSE P FAST SERPL-MCNC: 92 MG/DL (ref 65–99)
HDLC SERPL-MCNC: 45 MG/DL
LDLC SERPL CALC-MCNC: 80 MG/DL (ref 0–100)
NONHDLC SERPL-MCNC: 94 MG/DL
POTASSIUM SERPL-SCNC: 4.4 MMOL/L (ref 3.5–5.3)
PROT SERPL-MCNC: 7.6 G/DL (ref 6.4–8.2)
SODIUM SERPL-SCNC: 139 MMOL/L (ref 136–145)
TRIGL SERPL-MCNC: 69 MG/DL

## 2021-10-25 PROCEDURE — 80061 LIPID PANEL: CPT | Performed by: INTERNAL MEDICINE

## 2021-10-25 PROCEDURE — 80053 COMPREHEN METABOLIC PANEL: CPT | Performed by: INTERNAL MEDICINE

## 2021-10-25 PROCEDURE — 36415 COLL VENOUS BLD VENIPUNCTURE: CPT | Performed by: INTERNAL MEDICINE

## 2021-11-09 ENCOUNTER — APPOINTMENT (OUTPATIENT)
Dept: LAB | Facility: CLINIC | Age: 81
End: 2021-11-09
Payer: MEDICARE

## 2021-11-09 ENCOUNTER — ANTICOAG VISIT (OUTPATIENT)
Dept: CARDIOLOGY CLINIC | Facility: CLINIC | Age: 81
End: 2021-11-09

## 2021-11-09 ENCOUNTER — IN-CLINIC DEVICE VISIT (OUTPATIENT)
Dept: CARDIOLOGY CLINIC | Facility: CLINIC | Age: 81
End: 2021-11-09
Payer: MEDICARE

## 2021-11-09 DIAGNOSIS — Z95.0 PRESENCE OF CARDIAC PACEMAKER: Primary | ICD-10-CM

## 2021-11-09 PROCEDURE — 93279 PRGRMG DEV EVAL PM/LDLS PM: CPT | Performed by: INTERNAL MEDICINE

## 2021-11-21 DIAGNOSIS — E11.42 DIABETIC POLYNEUROPATHY ASSOCIATED WITH TYPE 2 DIABETES MELLITUS (HCC): ICD-10-CM

## 2021-11-22 RX ORDER — LISINOPRIL 2.5 MG/1
TABLET ORAL
Qty: 90 TABLET | Refills: 0 | Status: SHIPPED | OUTPATIENT
Start: 2021-11-22 | End: 2022-02-21

## 2021-12-07 ENCOUNTER — IMMUNIZATIONS (OUTPATIENT)
Dept: FAMILY MEDICINE CLINIC | Facility: HOSPITAL | Age: 81
End: 2021-12-07

## 2021-12-07 DIAGNOSIS — Z23 ENCOUNTER FOR IMMUNIZATION: Primary | ICD-10-CM

## 2021-12-07 PROCEDURE — 0064A COVID-19 MODERNA VACC 0.25 ML BOOSTER: CPT

## 2021-12-07 PROCEDURE — 91306 COVID-19 MODERNA VACC 0.25 ML BOOSTER: CPT

## 2021-12-08 ENCOUNTER — OFFICE VISIT (OUTPATIENT)
Dept: PODIATRY | Facility: CLINIC | Age: 81
End: 2021-12-08
Payer: MEDICARE

## 2021-12-08 VITALS — RESPIRATION RATE: 17 BRPM | WEIGHT: 227 LBS | HEIGHT: 65 IN | BODY MASS INDEX: 37.82 KG/M2

## 2021-12-08 DIAGNOSIS — L84 CORNS: ICD-10-CM

## 2021-12-08 DIAGNOSIS — I70.209 PERIPHERAL ARTERIOSCLEROSIS (HCC): ICD-10-CM

## 2021-12-08 DIAGNOSIS — M79.672 PAIN IN BOTH FEET: ICD-10-CM

## 2021-12-08 DIAGNOSIS — M79.671 PAIN IN BOTH FEET: ICD-10-CM

## 2021-12-08 DIAGNOSIS — E11.51 TYPE 2 DIABETES MELLITUS WITH DIABETIC PERIPHERAL ANGIOPATHY WITHOUT GANGRENE, WITHOUT LONG-TERM CURRENT USE OF INSULIN (HCC): Primary | ICD-10-CM

## 2021-12-08 PROCEDURE — 11056 PARNG/CUTG B9 HYPRKR LES 2-4: CPT | Performed by: PODIATRIST

## 2021-12-16 ENCOUNTER — APPOINTMENT (OUTPATIENT)
Dept: LAB | Facility: HOSPITAL | Age: 81
End: 2021-12-16
Payer: MEDICARE

## 2021-12-16 ENCOUNTER — ANTICOAG VISIT (OUTPATIENT)
Dept: CARDIOLOGY CLINIC | Facility: CLINIC | Age: 81
End: 2021-12-16

## 2021-12-27 DIAGNOSIS — I48.20 CHRONIC ATRIAL FIBRILLATION (HCC): Primary | ICD-10-CM

## 2021-12-27 RX ORDER — WARFARIN SODIUM 5 MG/1
TABLET ORAL
Qty: 90 TABLET | Refills: 2 | Status: SHIPPED | OUTPATIENT
Start: 2021-12-27 | End: 2022-03-23

## 2022-01-06 DIAGNOSIS — K21.9 GASTROESOPHAGEAL REFLUX DISEASE: ICD-10-CM

## 2022-01-06 RX ORDER — FAMOTIDINE 40 MG/1
TABLET, FILM COATED ORAL
Qty: 90 TABLET | Refills: 1 | Status: SHIPPED | OUTPATIENT
Start: 2022-01-06 | End: 2022-06-30

## 2022-01-11 ENCOUNTER — TELEMEDICINE (OUTPATIENT)
Dept: NEUROLOGY | Facility: CLINIC | Age: 82
End: 2022-01-11
Payer: MEDICARE

## 2022-01-11 DIAGNOSIS — G25.81 RESTLESS LEGS SYNDROME (RLS): ICD-10-CM

## 2022-01-11 DIAGNOSIS — G60.8 SENSORY POLYNEUROPATHY: ICD-10-CM

## 2022-01-11 DIAGNOSIS — G31.84 MCI (MILD COGNITIVE IMPAIRMENT): ICD-10-CM

## 2022-01-11 DIAGNOSIS — G62.9 PERIPHERAL NEUROPATHY: Primary | ICD-10-CM

## 2022-01-11 DIAGNOSIS — H93.19 TINNITUS: ICD-10-CM

## 2022-01-11 DIAGNOSIS — G62.9 LARGE FIBER NEUROPATHY: ICD-10-CM

## 2022-01-11 PROCEDURE — 99214 OFFICE O/P EST MOD 30 MIN: CPT | Performed by: PSYCHIATRY & NEUROLOGY

## 2022-01-11 RX ORDER — GABAPENTIN 800 MG/1
800 TABLET ORAL 4 TIMES DAILY
Qty: 360 TABLET | Refills: 1 | Status: SHIPPED | OUTPATIENT
Start: 2022-01-11 | End: 2022-07-18

## 2022-01-11 NOTE — PROGRESS NOTES
Virtual Regular Visit    Verification of patient location:    Patient is located in the following state in which I hold an active license [place]      Assessment/Plan:    Problem List Items Addressed This Visit        Nervous and Auditory    Sensory polyneuropathy    Relevant Medications    gabapentin (NEURONTIN) 800 mg tablet    Peripheral neuropathy - Primary      Other Visit Diagnoses     Restless legs syndrome (RLS)        Large fiber neuropathy        Relevant Medications    gabapentin (NEURONTIN) 800 mg tablet    MCI (mild cognitive impairment)        Tinnitus            Patient is stable  Her pain is relatively well controlled as long as she takes her medication  Her RLS is well controlled  She may try lower dose to see if she has same effect  Her memory is not of concern for her at this time  Reason for visit is   Chief Complaint   Patient presents with    Virtual Regular Visit        Encounter provider Ramona Rivera MD    Provider located at [address] [place] [id number] [phone]      Recent Visits  No visits were found meeting these conditions  Showing recent visits within past 7 days and meeting all other requirements  Today's Visits  Date Type Provider Dept   01/11/22 Telemedicine Ramona Rivera MD [facility]   Showing today's visits and meeting all other requirements  Future Appointments  No visits were found meeting these conditions  Showing future appointments within next 150 days and meeting all other requirements       The patient was identified by name and date of birth  Bhavin Dugan was informed that this is a telemedicine visit and that the visit is being conducted through [address] [place] [address] Now and patient was informed that this is a secure, HIPAA-compliant platform  She agrees to proceed     My office door was closed  No one else was in the room    She acknowledged consent and understanding of privacy and security of the video platform  The patient has agreed to participate and understands they can discontinue the visit at any time  Patient is aware this is a billable service  Subjective/HPI  Nikunj Sweet is a 80 y o  female with PMH of neuropathy, A fib, and RLS is being followed up  She is former patient of Dr Avelina Blue  Patient had borderline diabetes  Her most recent A1c was 5 6  She used to get burning and pins and needle sensation but now only gets numbness  Patient has been on gabapentin 800mg qid  she wanted to reduce dose and tried but pain was returning  When she does take 800mg qid, pain is controlled well  She however does forget to take it sometime  Her most recent HgbA1c was 5 6  She now goes to gym and it has helped her in many ways  Her balance is better       She also suffers from RLS  She is now on mirapex 1mg bid       She lives with her   Her memory is not good  She has hard time remembering when her conditions started  She has chronic A fib and is on coumadin       Patient has tinnitus  She saw ENT for this but it hasn't helped  Now symptom is not as bad        Past Medical History:   Diagnosis Date    Atrial fibrillation ([facility] [address] )     Carrero's esophagus     last assessed: 1/23/2018    BRCA1 negative     BRCA2 negative     Breast cancer ([facility] [address] ) 2006    stage 1 (left), given adjuvant radiation with Arimidex x 5 years    Cancer [facility]) 2006    Left Breast, Lumpectomy    Cataract     last assessed: 3/11/2014    Dysplasia of toenail     last assessed: 8/29/2017    Esophageal reflux     GERD (gastroesophageal reflux disease)     Gross hematuria     last assessed: 2/19/2015    Hematuria     Hiatal hernia     History of radiation therapy     Hypertension     Irregular heart beat     AFIB    Mixed sensory-motor polyneuropathy     Neuropathy     Obesity     Pacemaker     Paroxysmal atrial fibrillation (HCC)     Peripheral neuropathy     Rectal bleeding     Restless leg syndrome     Shortness of breath     last assessed: 1/11/2016       Past Surgical History:   Procedure Laterality Date    BREAST BIOPSY Left 2006    BREAST LUMPECTOMY Left 2006    onset: 2006    BREAST SURGERY      CARDIAC PACEMAKER PLACEMENT      x 3 2006    CATARACT EXTRACTION      COLONOSCOPY      CYSTOSCOPY  04/04/2014    diagnostic    HYSTERECTOMY      ALISON BSO; due to fibroid uterus; age 38   Izabella  KNEE CARTILAGE SURGERY      excision lesion of meniscus or capsule knee    KNEE SURGERY      OOPHORECTOMY Bilateral     OTHER SURGICAL HISTORY      radiation therapy    FL COLONOSCOPY FLX DX W/COLLJ SPEC WHEN PFRMD N/A 2/8/2017    Procedure: COLONOSCOPY;  Surgeon: Anat Kellogg MD;  [facility]; Service: Gastroenterology    [place] ESOPHAGOGASTRODUODENOSCOPY TRANSORAL DIAGNOSTIC N/A 9/20/2017    Procedure: ESOPHAGOGASTRODUODENOSCOPY (EGD); Surgeon: Frank Mata MD;  [facility];   Service: Gastroenterology    FL TOTAL KNEE ARTHROPLASTY Left 8/17/2020    Procedure: ARTHROPLASTY KNEE TOTAL;  Surgeon: Brenda Garrett DO;  Location: [address] [place] [address];  Service: Orthopedics    UPPER GASTROINTESTINAL ENDOSCOPY         Current Outpatient Medications   Medication Sig Dispense Refill    acetaminophen (TYLENOL) 650 mg CR tablet Take 1,300 mg by mouth daily as needed        Calcium Acetate, Phos Binder, (CALCIUM ACETATE PO) Take by mouth daily        clobetasol (TEMOVATE) 0 05 % ointment Apply once weekly to the affected area 30 g 3    famotidine (PEPCID) 40 MG tablet TAKE 1 TABLET BY MOUTH EVERY DAY 90 tablet 1    Flovent  MCG/ACT inhaler INHALE 2 PUFFS 2 (TWO) TIMES A DAY RINSE MOUTH AFTER USE  12 g 1    fluticasone (FLONASE) 50 mcg/act nasal spray SPRAY 1 SPRAY INTO EACH NOSTRIL EVERY DAY 16 mL 1    furosemide (LASIX) 40 mg tablet TAKE 1 TABLET BY MOUTH EVERY DAY (Patient taking differently: 40 mg ) 90 tablet 3    gabapentin (NEURONTIN) 800 mg tablet Take 1 tablet (800 mg total) by mouth 4 (four) times a day 360 tablet 1    lisinopril (ZESTRIL) 2 5 mg tablet TAKE 1 TABLET BY MOUTH EVERY DAY 90 tablet 0    loratadine (CLARITIN) 10 mg tablet Take 10 mg by mouth daily      magnesium 30 MG tablet Take 30 mg by mouth 2 (two) times a day      metoprolol tartrate (LOPRESSOR) 50 mg tablet Take 1 tablet (50 mg total) by mouth every 12 (twelve) hours 180 tablet 3    multivitamin (THERAGRAN) TABS Take 1 tablet by mouth daily      mupirocin (BACTROBAN) 2 % ointment       pramipexole (MIRAPEX) 0 5 mg tablet 2 FULL TABLETS TWICE A DAY AT 5:00 P  M  AND 10:00 P M  360 tablet 1    sodium chloride (OCEAN) 0 65 % nasal spray 1 spray into each nostril 2 (two) times a day as needed for rhinitis 60 mL 1    warfarin (Coumadin) 5 mg tablet TAKE 2 TABS BY MOUTH DAILY OR AS DIRECTED BY PHYSICIAN 90 tablet 2    warfarin (COUMADIN) 7 5 mg tablet TAKE 1 TABLET BY MOUTH EVERY DAY 90 tablet 3    metoprolol tartrate (LOPRESSOR) 50 mg tablet Take 1 5 tablets (75 mg total) by mouth 2 (two) times a day (Patient not taking: Reported on 1/11/2022 ) 270 tablet 3     No current facility-administered medications for this visit  Allergies   Allergen Reactions    Cephalexin Rash    Duloxetine Hcl Other (See Comments)     Facial pins and needles sensation    Erythromycin Rash    Levofloxacin Other (See Comments)     Muscular aches    Penicillins Rash    Savella [Milnacipran] Rash    Sulfa Antibiotics Rash       Review of Systems   Neurological: Positive for numbness  All other systems reviewed and are negative  Video Exam    There were no vitals filed for this visit  Physical Exam  Constitutional:       Appearance: Normal appearance  She is normal weight  Neurological:      General: No focal deficit present  Mental Status: She is alert and oriented to person, place, and time  Mental status is at baseline     Psychiatric:         Mood and Affect: Mood normal           I spent 30 minutes with patient today in which greater than 50% of the time was spent in counseling/coordination of care regarding her condition and plan  VIRTUAL VISIT DISCLAIMER      Indira Ortiz verbally agrees to participate in [employer]  Pt is aware that [employer] could be limited without vital signs or the ability to perform a full hands-on physical Viola Barrera understands she or the provider may request at any time to terminate the video visit and request the patient to seek care or treatment in person

## 2022-01-14 ENCOUNTER — TELEPHONE (OUTPATIENT)
Dept: NEUROLOGY | Facility: CLINIC | Age: 82
End: 2022-01-14

## 2022-01-14 NOTE — TELEPHONE ENCOUNTER
Patient called in complaining of her BL leg pain  The patient states that she is unable to stand or walk for long periods of time due to the pain from the knees down  The patient also states that this has been an ongoing problem

## 2022-01-17 NOTE — TELEPHONE ENCOUNTER
Patient notified    I would ask her to see or speak to PCP to r/o vascular cause  Its not from neuropathy

## 2022-01-26 ENCOUNTER — ANTICOAG VISIT (OUTPATIENT)
Dept: CARDIOLOGY CLINIC | Facility: CLINIC | Age: 82
End: 2022-01-26

## 2022-01-26 ENCOUNTER — APPOINTMENT (OUTPATIENT)
Dept: LAB | Facility: HOSPITAL | Age: 82
End: 2022-01-26
Payer: MEDICARE

## 2022-02-03 ENCOUNTER — OFFICE VISIT (OUTPATIENT)
Dept: CARDIOLOGY CLINIC | Facility: CLINIC | Age: 82
End: 2022-02-03
Payer: MEDICARE

## 2022-02-03 VITALS
WEIGHT: 231.6 LBS | HEIGHT: 65 IN | BODY MASS INDEX: 38.59 KG/M2 | DIASTOLIC BLOOD PRESSURE: 88 MMHG | SYSTOLIC BLOOD PRESSURE: 128 MMHG | HEART RATE: 90 BPM | OXYGEN SATURATION: 98 %

## 2022-02-03 DIAGNOSIS — I48.20 CHRONIC ATRIAL FIBRILLATION (HCC): Primary | ICD-10-CM

## 2022-02-03 PROCEDURE — 99214 OFFICE O/P EST MOD 30 MIN: CPT | Performed by: INTERNAL MEDICINE

## 2022-02-03 PROCEDURE — 93000 ELECTROCARDIOGRAM COMPLETE: CPT | Performed by: INTERNAL MEDICINE

## 2022-02-03 NOTE — PROGRESS NOTES
Cardiology Follow Up    [facility]  1940  [id number]  [facility]  [address]  [address]  [address] [address]  [phone]    1  Chronic atrial fibrillation (HCC)  POCT ECG       Interval History: Followup afib    She has some fatigue and arthritis  No chest pain, dyspnea or palpitations  Medication adherence okay  No other issues  Medical Problems             Problem List     Hiatal hernia    Atrial fibrillation [facility]) [I48 91]    Overview Signed 3/10/2018  8:59 AM by Julito Lao MD     Description: s/p 2 unsuccessful ablation, s/p 2 cardioversion last in 2010, s/p pacemaker  ; on anticoagulation followed by cardiology         Acquired deformity of foot    [place]    Diabetic polyneuropathy associated with type 2 diabetes mellitus ([facility] [address] )    Overview Signed 3/12/2018 12:21 PM by Julito Lao MD     ?duloxetine           Lab Results   Component Value Date    HGBA1C 5 6 06/03/2021         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[facility] [address] )    Radiculopathy of lumbar region    Primary osteoarthritis of both knees    Overview Signed 6/22/2020 10:15 AM by Julito Lao MD     S/p injections         Sensory polyneuropathy    RLS (restless legs syndrome)    Arthritis    Essential hypertension    Overview Signed 12/22/2020 12:51 PM by Julito Lao MD     lisinopril         Multiple lung nodules    Overview Addendum 6/22/2020 10:16 AM by Julito Lao MD     Stable, no further CT  Last CT 2016         Severe obesity (BMI 35 0-39  9) with comorbidity ([facility] [address] )    Osteopenia of multiple sites    Peripheral neuropathy    Overview Signed 3/10/2018  8:59 AM by Julito Lao MD     Transitioned From: Neuropathy         Vitamin D deficiency    Dense breast tissue on mammogram    Difficulty walking    Flat foot    Onychomycosis    Carrero's esophagus with dysplasia    Overview Signed 6/3/2021  8:14 AM by [facility] Norma Mccain MD     EGD 7/20         Mild cognitive impairment    Pacemaker    GERD (gastroesophageal reflux disease)    Pain in both feet    Chronic edema    Deep venous insufficiency    Colon polyps    Lichen sclerosus et atrophicus of the vulva    Status post total knee replacement using cement, left    Leg swelling    Seasonal allergies    Exotropia of right eye    Chronic kidney disease (CKD), stage II (mild)    Lab Results   Component Value Date    EGFR 55 10/25/2021    EGFR 49 06/03/2021    EGFR 51 12/08/2020    CREATININE 0 98 10/25/2021    CREATININE 1 09 06/03/2021    CREATININE 1 05 12/08/2020                   Past Medical History:   Diagnosis Date    Atrial fibrillation ([facility] [address] )     Carrero's esophagus     last assessed: 1/23/2018    BRCA1 negative     BRCA2 negative     [facility]    stage 1 (left), given adjuvant radiation with Arimidex x 5 years    Cancer [facility]) 2006    Left Breast, Lumpectomy    Cataract     last assessed: 3/11/2014    Dysplasia of toenail     last assessed: 8/29/2017    Esophageal reflux     GERD (gastroesophageal reflux disease)     Gross hematuria     last assessed: 2/19/2015    Hematuria     Hiatal hernia     History of radiation therapy     Hypertension     Irregular heart beat     AFIB    Mixed sensory-motor polyneuropathy     Neuropathy     Obesity     Pacemaker     Paroxysmal atrial fibrillation (HCC)     Peripheral neuropathy     Rectal bleeding     Restless leg syndrome     Shortness of breath     last assessed: 1/11/2016     Social History     Socioeconomic History    Marital status: /Civil Union     Spouse name: Not on file    Number of children: Not on file    Years of education: Not on file    Highest education level: Not on file   Occupational History    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     Comment: retired   Tobacco Use    Smoking status: Former Smoker     Packs/day: 1 00     Years: 25 00 Pack years: 25 00     Types: Cigarettes     Quit date: 1980     Years since quittin 4    Smokeless tobacco: Never Used    Tobacco comment: Quit over 30 years ago; quit age 41   Vaping Use    Vaping Use: Never used   Substance and Sexual Activity    Alcohol use: Not Currently    Drug use: No    Sexual activity: Not on file   Other Topics Concern    Not on file   Social History Narrative         Social Determinants of Health     Financial Resource Strain: Not on file   Food Insecurity: Not on file   Transportation Needs: Not on file   Physical Activity: Not on file   Stress: Not on file   Social Connections: Not on file   Intimate Partner Violence: Not on file   Housing Stability: Not on file      Family History   Problem Relation Age of Onset    Hypertension Mother     Heart disease Father     Aneurysm Father     Coronary artery disease Father         in his 76s with aneurysm    Aortic aneurysm Father         abdominal    Scleroderma Sister     Breast cancer Sister 76    Hypertension Sister     Cancer Sister     No Known Problems Son     No Known Problems Son     Testicular cancer Son 39    Thyroid cancer Son 45    Colon cancer Maternal Aunt     Colon cancer Maternal Aunt     Breast cancer Other 48        kaylee's daughter    Alcohol abuse Neg Hx     Substance Abuse Neg Hx     Mental illness Neg Hx     Depression Neg Hx      Past Surgical History:   Procedure Laterality Date    BREAST BIOPSY Left 2006    BREAST LUMPECTOMY Left 2006    onset: 2006    BREAST SURGERY      CARDIAC PACEMAKER PLACEMENT      x 3 2006    CATARACT EXTRACTION      COLONOSCOPY      CYSTOSCOPY  2014    diagnostic    HYSTERECTOMY      ALISON BSO; due to fibroid uterus; age 38   Brynn Thomas KNEE CARTILAGE SURGERY      excision lesion of meniscus or capsule knee    KNEE SURGERY      OOPHORECTOMY Bilateral     OTHER SURGICAL HISTORY      radiation therapy    IN COLONOSCOPY FLX DX W/COLLJ SPEC WHEN PFRMD N/A 2/8/2017    Procedure: COLONOSCOPY;  Surgeon: Jh Jolly MD;  [facility]; Service: Gastroenterology    [place] ESOPHAGOGASTRODUODENOSCOPY TRANSORAL DIAGNOSTIC N/A 9/20/2017    Procedure: ESOPHAGOGASTRODUODENOSCOPY (EGD); Surgeon: Nils Moritz, MD;  Location:  GI LAB;   Service: Gastroenterology    [place] TOTAL KNEE ARTHROPLASTY Left 8/17/2020    Procedure: ARTHROPLASTY KNEE TOTAL;  Surgeon: Delicia Stover DO;  Location: [address] [place] [address];  Service: Orthopedics    UPPER GASTROINTESTINAL ENDOSCOPY         Current Outpatient Medications:     acetaminophen (TYLENOL) 650 mg CR tablet, Take 1,300 mg by mouth daily as needed  , Disp: , Rfl:     Calcium Acetate, Phos Binder, (CALCIUM ACETATE PO), Take by mouth daily  , Disp: , Rfl:     clobetasol (TEMOVATE) 0 05 % ointment, Apply once weekly to the affected area, Disp: 30 g, Rfl: 3    famotidine (PEPCID) 40 MG tablet, TAKE 1 TABLET BY MOUTH EVERY DAY, Disp: 90 tablet, Rfl: 1    Flovent  MCG/ACT inhaler, INHALE 2 PUFFS 2 (TWO) TIMES A DAY RINSE MOUTH AFTER USE , Disp: 12 g, Rfl: 1    fluticasone (FLONASE) 50 mcg/act nasal spray, SPRAY 1 SPRAY INTO EACH NOSTRIL EVERY DAY, Disp: 16 mL, Rfl: 1    furosemide (LASIX) 40 mg tablet, TAKE 1 TABLET BY MOUTH EVERY DAY (Patient taking differently: 40 mg ), Disp: 90 tablet, Rfl: 3    gabapentin (NEURONTIN) 800 mg tablet, Take 1 tablet (800 mg total) by mouth 4 (four) times a day, Disp: 360 tablet, Rfl: 1    lisinopril (ZESTRIL) 2 5 mg tablet, TAKE 1 TABLET BY MOUTH EVERY DAY, Disp: 90 tablet, Rfl: 0    loratadine (CLARITIN) 10 mg tablet, Take 10 mg by mouth daily, Disp: , Rfl:     magnesium 30 MG tablet, Take 30 mg by mouth 2 (two) times a day, Disp: , Rfl:     metoprolol tartrate (LOPRESSOR) 50 mg tablet, Take 1 tablet (50 mg total) by mouth every 12 (twelve) hours, Disp: 180 tablet, Rfl: 3    multivitamin (THERAGRAN) TABS, Take 1 tablet by mouth daily, Disp: , Rfl:     pramipexole (MIRAPEX) 0 5 mg tablet, 2 FULL TABLETS TWICE A DAY AT 5:00 P  M   AND 10:00 P M , Disp: 360 tablet, Rfl: 1    sodium chloride (OCEAN) 0 65 % nasal spray, 1 spray into each nostril 2 (two) times a day as needed for rhinitis, Disp: 60 mL, Rfl: 1    warfarin (Coumadin) 5 mg tablet, TAKE 2 TABS BY MOUTH DAILY OR AS DIRECTED BY PHYSICIAN, Disp: 90 tablet, Rfl: 2    warfarin (COUMADIN) 7 5 mg tablet, TAKE 1 TABLET BY MOUTH EVERY DAY, Disp: 90 tablet, Rfl: 3    metoprolol tartrate (LOPRESSOR) 50 mg tablet, Take 1 5 tablets (75 mg total) by mouth 2 (two) times a day (Patient not taking: Reported on 1/11/2022 ), Disp: 270 tablet, Rfl: 3    mupirocin (BACTROBAN) 2 % ointment, , Disp: , Rfl:   Allergies   Allergen Reactions    Cephalexin Rash    Duloxetine Hcl Other (See Comments)     Facial pins and needles sensation    Erythromycin Rash    Levofloxacin Other (See Comments)     Muscular aches    Penicillins Rash    Savella [Milnacipran] Rash    Sulfa Antibiotics Rash       Labs:     Chemistry        Component Value Date/Time     11/09/2015 1115    K 4 4 10/25/2021 1110    K 4 5 11/09/2015 1115     10/25/2021 1110     11/09/2015 1115    CO2 33 (H) 10/25/2021 1110    CO2 30 (H) 11/09/2015 1115    BUN 27 (H) 10/25/2021 1110    BUN 20 11/09/2015 1115    CREATININE 0 98 10/25/2021 1110    CREATININE 0 8 11/09/2015 1115        Component Value Date/Time    CALCIUM 9 1 10/25/2021 1110    CALCIUM 9 0 11/09/2015 1115    ALKPHOS 69 10/25/2021 1110    ALKPHOS 61 11/09/2015 1115    AST 23 10/25/2021 1110    AST 23 11/09/2015 1115    ALT 34 10/25/2021 1110    ALT 31 11/09/2015 1115    BILITOT 0 5 11/09/2015 1115            Lab Results   Component Value Date    CHOL 162 11/09/2015     Lab Results   Component Value Date    HDL 45 10/25/2021    HDL 40 12/08/2020    HDL 35 (L) 11/14/2019     Lab Results   Component Value Date    LDLCALC 80 10/25/2021    LDLCALC 61 12/08/2020    [facility] 80 11/14/2019     Lab Results   Component Value Date    TRIG 69 10/25/2021    TRIG 69 12/08/2020    TRIG 127 11/14/2019     No results found for: CHOLHDL    Imaging: No results found  EKG: Atrial Fibrillation     Review of Systems   Constitutional: Negative  HENT: Negative  Eyes: Negative  Cardiovascular: Negative  Respiratory: Negative  Endocrine: Negative  Hematologic/Lymphatic: Negative  Skin: Negative  Musculoskeletal: Positive for arthritis and joint pain  Gastrointestinal: Negative  Genitourinary: Negative  Neurological: Negative  Psychiatric/Behavioral: Negative  Allergic/Immunologic: Negative  Vitals:    02/03/22 1051   BP: 128/88   Pulse: 90   SpO2: 98%           Physical Exam  Vitals and nursing note reviewed  Constitutional:       Appearance: Normal appearance  HENT:      Head: Normocephalic  Nose: Nose normal       Mouth/Throat:      Mouth: Mucous membranes are moist    Eyes:      General: No scleral icterus  Conjunctiva/sclera: Conjunctivae normal    Cardiovascular:      Rate and Rhythm: Normal rate  Rhythm irregular  Heart sounds: No murmur heard  No gallop  Pulmonary:      Effort: Pulmonary effort is normal  No respiratory distress  Breath sounds: Normal breath sounds  No wheezing or rales  Abdominal:      General: Abdomen is flat  Bowel sounds are normal  There is no distension  Palpations: Abdomen is soft  Tenderness: There is no abdominal tenderness  There is no guarding  Musculoskeletal:      Cervical back: Normal range of motion and neck supple  Right lower leg: No edema  Left lower leg: No edema  Skin:     General: Skin is warm and dry  Neurological:      General: No focal deficit present  Mental Status: She is alert and oriented to person, place, and time  Psychiatric:         Mood and Affect: Mood normal          Behavior: Behavior normal          Discussion/Summary:    1  Chronic Atrial Fibrillation: Overall doing well  Continue Metoprolol  Stable on Coumadin      2  Hypertension: Her BP is well controlled  Continue current medical therapy     3  s/p PPM: Continue device checks  Device checks were reviewed        4  Mild to moderate Aortic Stenosis: Will continue to follow              The patient was counseled regarding diagnostic results, instructions for management, risk factor reductions, impressions  total time of encounter was 25 minutes and 15 minutes was spent counseling

## 2022-02-04 ENCOUNTER — OFFICE VISIT (OUTPATIENT)
Dept: OBGYN CLINIC | Facility: CLINIC | Age: 82
End: 2022-02-04
Payer: MEDICARE

## 2022-02-04 ENCOUNTER — APPOINTMENT (OUTPATIENT)
Dept: RADIOLOGY | Facility: CLINIC | Age: 82
End: 2022-02-04
Payer: MEDICARE

## 2022-02-04 VITALS
HEIGHT: 65 IN | BODY MASS INDEX: 38.59 KG/M2 | HEART RATE: 90 BPM | WEIGHT: 231.6 LBS | DIASTOLIC BLOOD PRESSURE: 78 MMHG | SYSTOLIC BLOOD PRESSURE: 115 MMHG

## 2022-02-04 DIAGNOSIS — Z86.39 HISTORY OF DIABETES MELLITUS: ICD-10-CM

## 2022-02-04 DIAGNOSIS — Z01.818 PRE-OP EXAM: ICD-10-CM

## 2022-02-04 DIAGNOSIS — M17.11 PRIMARY OSTEOARTHRITIS OF RIGHT KNEE: ICD-10-CM

## 2022-02-04 DIAGNOSIS — G89.29 CHRONIC PAIN OF RIGHT KNEE: ICD-10-CM

## 2022-02-04 DIAGNOSIS — Z95.0 PACEMAKER: ICD-10-CM

## 2022-02-04 DIAGNOSIS — Z87.19 HISTORY OF BARRETT'S ESOPHAGUS: ICD-10-CM

## 2022-02-04 DIAGNOSIS — Z86.69 HISTORY OF NEUROPATHY: ICD-10-CM

## 2022-02-04 DIAGNOSIS — Z86.79 HISTORY OF HYPERTENSION: ICD-10-CM

## 2022-02-04 DIAGNOSIS — Z01.89 ENCOUNTER FOR LOWER EXTREMITY COMPARISON IMAGING STUDY: ICD-10-CM

## 2022-02-04 DIAGNOSIS — M17.11 PRIMARY OSTEOARTHRITIS OF RIGHT KNEE: Primary | ICD-10-CM

## 2022-02-04 DIAGNOSIS — Z86.79 HISTORY OF ATRIAL FIBRILLATION: ICD-10-CM

## 2022-02-04 DIAGNOSIS — M25.561 CHRONIC PAIN OF RIGHT KNEE: ICD-10-CM

## 2022-02-04 PROCEDURE — 99215 OFFICE O/P EST HI 40 MIN: CPT | Performed by: ORTHOPAEDIC SURGERY

## 2022-02-04 PROCEDURE — 73560 X-RAY EXAM OF KNEE 1 OR 2: CPT

## 2022-02-04 PROCEDURE — 73562 X-RAY EXAM OF KNEE 3: CPT

## 2022-02-04 NOTE — PROGRESS NOTES
Assessment/Plan:  1  Primary osteoarthritis of right knee  Case request operating room: ARTHROPLASTY KNEE TOTAL W ROBOT - RIGHT    Comprehensive metabolic panel    Hemoglobin A1C W/EAG Estimation    CBC and differential    Protime-INR    APTT    MRSA culture    Ambulatory referral to Cardiology    Ambulatory referral to [facility]    Ambulatory referral to Physical Therapy    EKG 12 lead    XR chest pa & lateral    If Symptomatic, order: UA w Reflex to Microscopic w Reflex to Culture    PAT Covid Screening    Case request operating room: ARTHROPLASTY KNEE TOTAL W ROBOT - RIGHT    XR knee 3 vw right non injury   2  Chronic pain of right knee  Case request operating room: ARTHROPLASTY KNEE TOTAL W ROBOT - RIGHT    Comprehensive metabolic panel    Hemoglobin A1C W/EAG Estimation    CBC and differential    Protime-INR    APTT    MRSA culture    Ambulatory referral to Cardiology    Ambulatory referral to [facility]    Ambulatory referral to Physical Therapy    EKG 12 lead    XR chest pa & lateral    If Symptomatic, order: UA w Reflex to Microscopic w Reflex to Culture    PAT Covid Screening    Case request operating room: ARTHROPLASTY KNEE TOTAL W ROBOT - RIGHT    XR knee 3 vw right non injury   3  History of diabetes mellitus  Ambulatory referral to [facility]   4  History of hypertension  Ambulatory referral to Cardiology   5  History of atrial fibrillation  Ambulatory referral to Cardiology   6  History of neuropathy  Ambulatory referral to [facility]   7  History of Carrero's esophagus  Ambulatory referral to [facility]   8  Pacemaker  Ambulatory referral to [facility]   9   Pre-op exam  Ambulatory referral to Cardiology    Ambulatory referral to [facility]    Ambulatory referral to Physical Therapy     Scribe Attestation    I,:  Hallie Hancock am acting as a scribe while in the presence of the attending physician :       I,:  Rosalva Estrada DO personally performed the services described in this documentation    as scribed in my presence :         Harshal Oswald is a pleasant 70-year-old female who returns today for follow-up evaluation of her chronic right knee pain  Unfortunately, she continues to experience persistent pain despite extensive nonoperative management including maintenance of appropriate weight, activity modification, over-the-counter medications, exercise program, and intra-articular injection therapy  Based on the progression of her underlying disease in her pain refractory to nonoperative care, I explained that she is a candidate for robotic assisted right total knee arthroplasty  The pre nasim and postoperative expectations were discussed here in the office today  The risks and benefits of undergoing robotic assisted right total knee arthroplasty were discussed at length and consents were signed and placed in the chart  Please see risk discussion below  She denies a history of DVT/PE, MRSA infection, diabetes, malignancy  She does have a history of GI bleed  She understands she requires preoperative clearance from her primary care physician and cardiologist   It would again be my preference to bridge postoperatively with Lovenox for 5 days back to Coumadin  She is an excellent candidate for outpatient physical therapy postoperatively  She will meet with my surgery scheduler today to pick a date for the procedure and make preoperative arrangements  All of her questions and concerns were addressed today  We will see her back at time of surgery  Subjective: Follow up evaluation for chronic right knee pain    Patient ID: Evans Osorio is a 80 y o  female who returns today for follow-up evaluation of her chronic right knee pain  She has been receiving nonoperative treatment for her right knee pain due to end-stage underlying osteoarthritis  She received a corticosteroid injection in July of 2021   At today's visit, she states that the injection was mostly ineffective at relieving her pain  Over the last few months, she complains of worsening of her activity related medial-sided aching knee pain  This can be severe with activity  She has done well following left total knee arthroplasty and would like to pursue this for her right knee  She denies any new injury or trauma  Review of Systems   Constitutional: Positive for activity change  Negative for chills, fever and unexpected weight change  HENT: Negative for hearing loss, nosebleeds and sore throat  Eyes: Negative for pain, redness and visual disturbance  Respiratory: Negative for cough, shortness of breath and wheezing  Cardiovascular: Negative for chest pain, palpitations and leg swelling  Gastrointestinal: Negative for abdominal pain, nausea and vomiting  Endocrine: Negative for polydipsia and polyuria  Genitourinary: Negative for dysuria and hematuria  Musculoskeletal: Positive for arthralgias and myalgias  Negative for joint swelling  See HPI   Skin: Negative for rash and wound  Neurological: Negative for dizziness, numbness and headaches  Psychiatric/Behavioral: Negative for decreased concentration and suicidal ideas  The patient is not nervous/anxious            Past Medical History:   Diagnosis Date    Atrial fibrillation ([facility] )     Carrero's esophagus     last assessed: 1/23/2018    BRCA1 negative     BRCA2 negative     [facility] 2006    stage 1 (left), given adjuvant radiation with Arimidex x 5 years    Cancer [facility]) 2006    Left Breast, Lumpectomy    Cataract     last assessed: 3/11/2014    Dysplasia of toenail     last assessed: 8/29/2017    Esophageal reflux     GERD (gastroesophageal reflux disease)     Gross hematuria     last assessed: 2/19/2015    Hematuria     Hiatal hernia     History of radiation therapy     Hypertension     Irregular heart beat     AFIB    Mixed sensory-motor polyneuropathy     Neuropathy     Obesity     Pacemaker  Paroxysmal atrial fibrillation (HCC)     Peripheral neuropathy     Rectal bleeding     Restless leg syndrome     Shortness of breath     last assessed: 1/11/2016       Past Surgical History:   Procedure Laterality Date    BREAST BIOPSY Left 2006    BREAST LUMPECTOMY Left 2006    onset: 2006    BREAST SURGERY      CARDIAC PACEMAKER PLACEMENT      x 3 2006    CATARACT EXTRACTION      COLONOSCOPY      CYSTOSCOPY  04/04/2014    diagnostic    HYSTERECTOMY      ALISON BSO; due to fibroid uterus; age 38   Bridget Clunes KNEE CARTILAGE SURGERY      excision lesion of meniscus or capsule knee    KNEE SURGERY      OOPHORECTOMY Bilateral     OTHER SURGICAL HISTORY      radiation therapy    ID COLONOSCOPY FLX DX W/COLLJ SPEC WHEN PFRMD N/A 2/8/2017    Procedure: COLONOSCOPY;  Surgeon: Gita Cruz MD;  Location: [facility]; Service: Gastroenterology    ID ESOPHAGOGASTRODUODENOSCOPY TRANSORAL DIAGNOSTIC N/A 9/20/2017    Procedure: ESOPHAGOGASTRODUODENOSCOPY (EGD); Surgeon: French Marques MD;  Location: [facility];   Service: Gastroenterology    ID TOTAL KNEE ARTHROPLASTY Left 8/17/2020    Procedure: ARTHROPLASTY KNEE TOTAL;  Surgeon: Wan Long DO;  Location: [address] [place] [address];  Service: Orthopedics    UPPER GASTROINTESTINAL ENDOSCOPY         Family History   Problem Relation Age of Onset    Hypertension Mother     Heart disease Father     Aneurysm Father     Coronary artery disease Father         in his 76s with aneurysm    Aortic aneurysm Father         abdominal    Scleroderma Sister     Breast cancer Sister 76    Hypertension Sister     Cancer Sister     No Known Problems Son     No Known Problems Son     Testicular cancer Son 39    Thyroid cancer Son 45    Colon cancer Maternal Aunt     Colon cancer Maternal Aunt     Breast cancer Other 48        kaylee's daughter    Alcohol abuse Neg Hx     Substance Abuse Neg Hx     Mental illness Neg Hx     Depression Neg Hx        Social History     Occupational History    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      Comment: retired   Tobacco Use    Smoking status: Former Smoker     Packs/day: 1 00     Years: 25 00     Pack years: 25 00     Types: Cigarettes     Quit date: 1980     Years since quittin 4    Smokeless tobacco: Never Used    Tobacco comment: Quit over 30 years ago; quit age 41   Vaping Use    Vaping Use: Never used   Substance and Sexual Activity    Alcohol use: Not Currently    Drug use: No    Sexual activity: Not on file         Current Outpatient Medications:     acetaminophen (TYLENOL) 650 mg CR tablet, Take 1,300 mg by mouth daily as needed  , Disp: , Rfl:     Calcium Acetate, Phos Binder, (CALCIUM ACETATE PO), Take by mouth daily  , Disp: , Rfl:     clobetasol (TEMOVATE) 0 05 % ointment, Apply once weekly to the affected area, Disp: 30 g, Rfl: 3    famotidine (PEPCID) 40 MG tablet, TAKE 1 TABLET BY MOUTH EVERY DAY, Disp: 90 tablet, Rfl: 1    Flovent  MCG/ACT inhaler, INHALE 2 PUFFS 2 (TWO) TIMES A DAY RINSE MOUTH AFTER USE , Disp: 12 g, Rfl: 1    fluticasone (FLONASE) 50 mcg/act nasal spray, SPRAY 1 SPRAY INTO EACH NOSTRIL EVERY DAY, Disp: 16 mL, Rfl: 1    furosemide (LASIX) 40 mg tablet, TAKE 1 TABLET BY MOUTH EVERY DAY (Patient taking differently: 40 mg ), Disp: 90 tablet, Rfl: 3    gabapentin (NEURONTIN) 800 mg tablet, Take 1 tablet (800 mg total) by mouth 4 (four) times a day, Disp: 360 tablet, Rfl: 1    lisinopril (ZESTRIL) 2 5 mg tablet, TAKE 1 TABLET BY MOUTH EVERY DAY, Disp: 90 tablet, Rfl: 0    loratadine (CLARITIN) 10 mg tablet, Take 10 mg by mouth daily, Disp: , Rfl:     magnesium 30 MG tablet, Take 30 mg by mouth 2 (two) times a day, Disp: , Rfl:     metoprolol tartrate (LOPRESSOR) 50 mg tablet, Take 1 tablet (50 mg total) by mouth every 12 (twelve) hours, Disp: 180 tablet, Rfl: 3    multivitamin (THERAGRAN) TABS, Take 1 tablet by mouth daily, Disp: , Rfl:     mupirocin (BACTROBAN) 2 % ointment,  , Disp: , Rfl:     pramipexole (MIRAPEX) 0 5 mg tablet, 2 FULL TABLETS TWICE A DAY AT 5:00 P  M  AND 10:00 P M , Disp: 360 tablet, Rfl: 1    sodium chloride (OCEAN) 0 65 % nasal spray, 1 spray into each nostril 2 (two) times a day as needed for rhinitis, Disp: 60 mL, Rfl: 1    warfarin (Coumadin) 5 mg tablet, TAKE 2 TABS BY MOUTH DAILY OR AS DIRECTED BY PHYSICIAN, Disp: 90 tablet, Rfl: 2    warfarin (COUMADIN) 7 5 mg tablet, TAKE 1 TABLET BY MOUTH EVERY DAY, Disp: 90 tablet, Rfl: 3    Allergies   Allergen Reactions    Latex      Added based on information entered during case entry, please review and add reactions, type, and severity as needed    Cephalexin Rash    Duloxetine Hcl Other (See Comments)     Facial pins and needles sensation    Erythromycin Rash    Levofloxacin Other (See Comments)     Muscular aches    Penicillins Rash    Savella [Milnacipran] Rash    Sulfa Antibiotics Rash       Objective:  Vitals:    02/04/22 1023   BP: 115/78   Pulse: 90       Body mass index is 39 14 kg/m²  Right Knee Exam     Tenderness   The patient is experiencing tenderness in the medial joint line (no joint line tenderness)  Range of Motion   Right knee extension: 5  Flexion: 120     Tests   Ned:  Medial - negative Lateral - negative  Varus: negative Valgus: negative  Lachman:  Anterior - negative        Other   Erythema: absent  Sensation: normal  Pulse: present  Swelling: none  Effusion: no effusion present    Comments:  7-10 degree varus deformity passively correctable  Stable at 0, 30 90   Neurovascularly intact distally  No warmth or erythema  Parapatellar crepitance noted  Patellofemoral grind:  Positive          Observations     Right Knee   Negative for effusion  Physical Exam  Vitals and nursing note reviewed  Constitutional:       Appearance: She is well-developed  HENT:      Head: Normocephalic and atraumatic  Eyes:      General: No scleral icterus  Conjunctiva/sclera: Conjunctivae normal    Cardiovascular:      Rate and Rhythm: Normal rate  Pulmonary:      Effort: Pulmonary effort is normal  No respiratory distress  Musculoskeletal:      Cervical back: Normal range of motion and neck supple  Right knee: No effusion  Instability Tests: Medial Ned test negative and lateral Nde test negative  Comments: As noted in HPI   Skin:     General: Skin is warm and dry  Neurological:      Mental Status: She is alert and oriented to person, place, and time  Psychiatric:         Behavior: Behavior normal          I have personally reviewed pertinent films in PACS  X-ray of the right knee with magnification marker obtained on 02/04/2022 reviewed demonstrating end-stage degenerative change in a varus pattern with bone-on-bone contact medially  There is sclerosis and marginal osteophytosis  There is no acute fracture, dislocation, lytic or blastic lesion  The patient was counseled in detail regarding the diagnosis, the treatment options available, the prognosis of each treatment option, the potential risks and complications  These are, but are not limited to; deep vein thrombosis, pulmonary embolism, neurologic and vascular injury, infection, instability, leg length discrepancy, dislocation, hematoma, reflex sympathetic dystrophy, loss of range of motion, ankylosis of the knee, fracture, screw or prosthetic perforation, chronic pain, acute pain, chronic leg pain and edema, loosening, death, heart attack, and stroke  The patient's questions were answered in detail  The patient demonstrates understanding of these risks and wishes to proceed with surgery

## 2022-02-05 DIAGNOSIS — G25.81 RLS (RESTLESS LEGS SYNDROME): ICD-10-CM

## 2022-02-07 RX ORDER — PRAMIPEXOLE DIHYDROCHLORIDE 0.5 MG/1
TABLET ORAL
Qty: 360 TABLET | Refills: 1 | Status: SHIPPED | OUTPATIENT
Start: 2022-02-07 | End: 2022-08-01

## 2022-02-08 ENCOUNTER — TELEPHONE (OUTPATIENT)
Dept: CARDIOLOGY CLINIC | Facility: CLINIC | Age: 82
End: 2022-02-08

## 2022-02-08 NOTE — TELEPHONE ENCOUNTER
Pt is having knee surgery  And requested to speak with you in regards to the procedure    Advised pt we have a clearance letter awaiting a signature       C/B [phone]

## 2022-02-09 ENCOUNTER — APPOINTMENT (OUTPATIENT)
Dept: LAB | Facility: HOSPITAL | Age: 82
End: 2022-02-09
Payer: MEDICARE

## 2022-02-09 ENCOUNTER — ANTICOAG VISIT (OUTPATIENT)
Dept: CARDIOLOGY CLINIC | Facility: CLINIC | Age: 82
End: 2022-02-09

## 2022-02-09 ENCOUNTER — OFFICE VISIT (OUTPATIENT)
Dept: PODIATRY | Facility: CLINIC | Age: 82
End: 2022-02-09
Payer: MEDICARE

## 2022-02-09 VITALS — WEIGHT: 231 LBS | RESPIRATION RATE: 16 BRPM | BODY MASS INDEX: 38.49 KG/M2 | HEIGHT: 65 IN

## 2022-02-09 DIAGNOSIS — E11.51 TYPE 2 DIABETES MELLITUS WITH DIABETIC PERIPHERAL ANGIOPATHY WITHOUT GANGRENE, WITHOUT LONG-TERM CURRENT USE OF INSULIN (HCC): Primary | ICD-10-CM

## 2022-02-09 DIAGNOSIS — L84 CORNS: ICD-10-CM

## 2022-02-09 DIAGNOSIS — M79.672 PAIN IN BOTH FEET: ICD-10-CM

## 2022-02-09 DIAGNOSIS — M79.671 PAIN IN BOTH FEET: ICD-10-CM

## 2022-02-09 DIAGNOSIS — I70.209 PERIPHERAL ARTERIOSCLEROSIS (HCC): ICD-10-CM

## 2022-02-09 PROCEDURE — 11056 PARNG/CUTG B9 HYPRKR LES 2-4: CPT | Performed by: PODIATRIST

## 2022-02-09 NOTE — PROGRESS NOTES
Assessment/Plan:  Painful calluses   Diabetic neuropathy   Peripheral vascular disease   Chronic edema   Chronic plantar fasciitis left foot         Plan   Diabetic foot exam performed   All mycotic nails debrided   Plantar calluses debrided without pain or complication   Procedures performed without pain or complication         Subjective   Patient is diabetic   She has pain with walking   She has pain with shoe wear   No history of trauma     Diabetic polyneuropathy associated with type 2 diabetes mellitus (HCC)     Corns     Pain in both feet     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HCC)     Chronic edema   Rule out deep venous insufficiency       Discussion/Summary  The patient was counseled regarding instructions for management,-- prognosis,-- patient and family education,-- risks and benefits of treatment options,-- importance of compliance with treatment  Patient is able to Self-Care  Possible side effects of new medications were reviewed with the patient/guardian today  The treatment plan was reviewed with the patient/guardian  The patient/guardian understands and agrees with the treatment plan      Chief Complaint  Patient has complaint of pain in her toes with ambulation   No history of trauma     History of Present Illness  HPI: Patient is doing well with Cymbalta however she began have facial tingling  She discontinued medicine   However the medication was relieving her neuropathic pain       Review of Systems     ROS reviewed       Active Problems     1  Achilles tendinitis of left lower extremity (726 71) (M76 62)   2  Acquired ankle/foot deformity (736 70) (M21 969)   3  AF (paroxysmal atrial fibrillation) (427 31) (I48 0)   4  Anticoagulant long-term use (V58 61) (Z79 01)   5  Arthritis (716 90) (M19 90)   6  At risk for bone density loss (V49 89) (Z91 89)   7  Atrial fibrillation (427 31) (I48 91)   8  History of Breast Cancer (V10 3)   9  Difficulty walking (719 7) (R26 2)   10  Encounter for screening mammogram for breast cancer (V76 12) (Z12 31)   11  Esophageal reflux (530 81) (K21 9)   12  Foot pain, bilateral (729 5) (M79 671,M79 672)   13  Hiatal hernia (553 3) (K44 9)   14  History of Carrero's esophagus (V12 79) (Z87 19)   15  History of fall (V15 88) (Z91 81)   16  Hypertension (401 9) (I10)   17  Leg pain, left (729 5) (I16 343)   18  Lichen sclerosus et atrophicus (701 0) (L90 0)   19  Lumbar radiculopathy (724 4) (M54 16)   20  Multiple lung nodules (793 19) (R91 8)   21  Obesity (278 00) (E66 9)   22  Onychomycosis (110 1) (B35 1)   23  Osteopenia (733 90) (M85 80)   24  Pacemaker (V45 01) (Z95 0)   25  Peripheral neuropathy (356 9) (G62 9)   26  Pes planus of both feet (734) (M21 41,M21 42)   27  Plantar fasciitis of left foot (728 71) (M72 2)   28  Restless legs syndrome (333 94) (G25 81)     Past Medical History   · History of Abnormal glucose (790 29) (R73 09)   · Atrial fibrillation (427 31) (I48 91)   · History of Breast Cancer (V10 3)   · 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)   · Esophageal reflux (530 81) (K21 9)   · Denied: History of Exposure To STD   · History of Gross hematuria (599 71) (R31 0)   · History of Hematoma (924 9) (T14 8XXA)   · History of Hematoma of lower extremity, right, initial encounter (924 5) (S80 11XA)   · History of backache (V13 59) (Z87 39)   · History of Carrero's esophagus (V12 79) (Z87 19)   · History of cataract (V12 49) (Z86 69)   · History of chest pain (V13 89) (Z87 898)   · History of fall (V15 88) (Z91 81)   · History of hematuria (V13 09) (Z87 448)   · History of postmenopausal hormone replacement therapy (V87 49) (Z92 29)   · History of shortness of breath (V13 89) (I34 457)   · History of urinary incontinence (V13 09) (Z87 898)   · History of Neck pain (723 1) (M54 2)   · Normal delivery (650) (O80,Z37  9)   · History of Right knee pain (719 46) (M25 561)   · History of Ringing In The Ears (Tinnitus) (388 30)   · History of Skin rash (782  1) (R21)   · History of Traumatic blister of right lower extremity, initial encounter (916 2) (G14 094Y)   · History of UTI (lower urinary tract infection) (599 0) (N39 0)     The active problems and past medical history were reviewed and updated today       Surgical History   · Denied: History of Abnormal Pap Smear Of Cervix   · History of Breast Surgery Lumpectomy   · History of Cataract Surgery   · History of Diagnostic Cystoscopy   · History of Excision Lesion Of Meniscus Or Capsule Knee   · History of Pacemaker - Pulse Generator Replacement   · History of Pacemaker Placement   · History of Pacemaker Placement   · History of Radiation Therapy   · History of Total Abdominal Hysterectomy With Removal Of Both Ovaries     The surgical history was reviewed and updated today        Family History  Mother    · Denied: Family history of Alcoholism and drug addiction in family   · Denied: Family history of Anxiety and depression  Father    · Denied: Family history of Alcoholism and drug addiction in family   · Denied: Family history of Anxiety and depression   · Family history of Coronary Artery Disease (V17 49)   · Family history of abdominal aortic aneurysm (V17 49) (Z82 49)  Child    · Denied: Family history of Alcoholism and drug addiction in family   · Denied: Family history of Anxiety and depression  Sibling    · Denied: Family history of Alcoholism and drug addiction in family   · Denied: Family history of Anxiety and depression  Sister    · Family history of Breast Cancer (V16 3)  Maternal Aunt    · Family history of Colon Cancer (V16 0)   · Family history of Colon Cancer (V16 0)  Family History    · Denied: Family history of Diabetes Mellitus   · Denied: Family history of Stroke Syndrome     The family history was reviewed and updated today        Social History      · Being A Social Drinker   · Denied: History of Drug Use   · Former smoker (V15 82) (I03 785)   · 25 pack years, quit age 41   · Marital History - Currently    · Retired From Work   · 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  The social history was reviewed and updated today       The medication list was reviewed and updated today        Allergies  1  duloxetine   2  LevoFLOXacin TABS   3  Cephalexin CAPS   4  Erythromycin Base TABS   5  Keflex TABS   6  Penicillins   7  Sulfa Drugs        Physical Exam  Left Foot: Appearance: Normal except as noted: excessive pronation-- and-- pes planus  Tenderness: None except the lisfranc joint-- and-- medial longitudinal arch  ROM: subtalar motion was restricted  Right Foot: Appearance: Normal except as noted: excessive pronation-- and-- pes planus  Tenderness: None except the lisfranc joint-- and-- medial longitudinal arch  ROM: subtalar motion was restricted   Left Ankle: ROM: limited ROM in all planes   Right Ankle: ROM: limited ROM in all planes   Neurological Exam: performed  Light touch was decreased bilaterally  Vibratory sensation was decreased in both first metatarsophalangeal joints  Deep tendon reflexes: achilles reflex absent bilateraly-- and-- 4/5 L5 testing bilateral    Vascular Exam: performed Dorsalis pedis pulses were diminished bilaterally  Posterior tibial pulses were diminished bilaterally  Dependence rubor was present bilaterally  Capillary refill time was Negative digital hair noted, but-- between 1-3 seconds bilaterally  Toenails: All of the toenails were elongated,-- hypertrophied,-- discolored-- and--   Hyperkeratosis: present on both first toes  Shoe Gear Evaluation: performed ()  Recommendation(s): SAS style       Patient's shoes and socks removed  Right Foot/Ankle   Right Foot Inspection        Sensory   Vibration: diminished  Proprioception: diminished      Vascular  Capillary refills: elevated        Left Foot/Ankle  Left Foot Inspection                    Sensory   Vibration: diminished  Proprioception: diminished     Vascular  Capillary refills: elevated      Patient's shoes and socks removed          Assign Risk Category  Deformity present  Loss of protective sensation  Weak pulses  Risk: 2

## 2022-02-10 ENCOUNTER — OFFICE VISIT (OUTPATIENT)
Dept: INTERNAL MEDICINE CLINIC | Facility: CLINIC | Age: 82
End: 2022-02-10
Payer: MEDICARE

## 2022-02-10 VITALS
OXYGEN SATURATION: 97 % | HEART RATE: 82 BPM | SYSTOLIC BLOOD PRESSURE: 128 MMHG | TEMPERATURE: 97.5 F | DIASTOLIC BLOOD PRESSURE: 72 MMHG | BODY MASS INDEX: 38.69 KG/M2 | HEIGHT: 65 IN | WEIGHT: 232.2 LBS

## 2022-02-10 DIAGNOSIS — M17.0 PRIMARY OSTEOARTHRITIS OF BOTH KNEES: ICD-10-CM

## 2022-02-10 DIAGNOSIS — I10 ESSENTIAL HYPERTENSION: ICD-10-CM

## 2022-02-10 DIAGNOSIS — G25.81 RLS (RESTLESS LEGS SYNDROME): ICD-10-CM

## 2022-02-10 DIAGNOSIS — J30.2 SEASONAL ALLERGIES: ICD-10-CM

## 2022-02-10 DIAGNOSIS — H50.10 EXOTROPIA OF RIGHT EYE: ICD-10-CM

## 2022-02-10 DIAGNOSIS — N18.2 CHRONIC KIDNEY DISEASE (CKD), STAGE II (MILD): Primary | ICD-10-CM

## 2022-02-10 DIAGNOSIS — M85.89 OSTEOPENIA OF MULTIPLE SITES: ICD-10-CM

## 2022-02-10 DIAGNOSIS — E66.01 SEVERE OBESITY (BMI 35.0-39.9) WITH COMORBIDITY (HCC): ICD-10-CM

## 2022-02-10 DIAGNOSIS — K22.719 BARRETT'S ESOPHAGUS WITH DYSPLASIA: ICD-10-CM

## 2022-02-10 DIAGNOSIS — G62.9 PERIPHERAL POLYNEUROPATHY: ICD-10-CM

## 2022-02-10 DIAGNOSIS — R26.2 DIFFICULTY WALKING: ICD-10-CM

## 2022-02-10 DIAGNOSIS — E11.42 DIABETIC POLYNEUROPATHY ASSOCIATED WITH TYPE 2 DIABETES MELLITUS (HCC): ICD-10-CM

## 2022-02-10 DIAGNOSIS — I48.20 CHRONIC ATRIAL FIBRILLATION (HCC): ICD-10-CM

## 2022-02-10 PROCEDURE — 99214 OFFICE O/P EST MOD 30 MIN: CPT | Performed by: INTERNAL MEDICINE

## 2022-02-10 NOTE — PROGRESS NOTES
Assessment/Plan:    Primary osteoarthritis of both knees  Surgery scheduled for R knee next month  Moderate risk  Chronic kidney disease (CKD), stage II (mild)  Lab Results   Component Value Date    EGFR 55 10/25/2021    EGFR 49 06/03/2021    EGFR 51 12/08/2020    CREATININE 0 98 10/25/2021    CREATININE 1 09 06/03/2021    CREATININE 1 05 12/08/2020     Stable  Atrial fibrillation (HCC) [I48 91]  Stable, on metoprolol and warfarin  On daily furosemide  Saw cardiology recently  Carrero's esophagus with dysplasia  Taking famotidine  Diabetic polyneuropathy associated with type 2 diabetes mellitus (HCC)  A1c due  Essential hypertension  BP controlled, on metoprolol and furosemide  Exotropia of right eye  Will try using prism prn due to left sided headaches  RLS (restless legs syndrome)  Stable, on pramipexole and gabapentin  Per neurology  Osteopenia of multiple sites  Stable, continue supplements  Difficulty walking  No recent falls, using cane  (+) weakness LLE  Discussed head imaging, ? Stroke  Unable to do MRI recommend CTA  On warfarin, not on statin  Seasonal allergies  Corey use steroid nasal spray daily  Severe obesity (BMI 35 0-39  9) with comorbidity ([address] )  Gained 5 lbs since last visit  Diagnoses and all orders for this visit:    Chronic kidney disease (CKD), stage II (mild)    Primary osteoarthritis of both knees    RLS (restless legs syndrome)    Peripheral polyneuropathy    Essential hypertension    Severe obesity (BMI 35 0-39  9) with comorbidity (HCC)    Seasonal allergies    Osteopenia of multiple sites    Exotropia of right eye    Difficulty walking    Diabetic polyneuropathy associated with type 2 diabetes mellitus ([address] )    Carrero's esophagus with dysplasia    Chronic atrial fibrillation ([address] )      Follow up in 4 months or as needed  Subjective:      Patient ID: Chris Christina is a 80 y o  female  Here for a follow up      She been going to the gym, has discovered that the left side of her body is much weaker than her right side  She is unable to lift it up her left leg as high compared to her right leg  She is using her cane, no recent falls  She stop using the USP last night and reports that left-sided headaches resolved  She feels she does need the USP when driving and with certain activities  She used Flonase for about 2 weeks, felt it helped a little bit, asking if she can use it daily  She has right knee surgery scheduled next month  She reports breathing is about the same, no chest pain or palpitations  The following portions of the patient's history were reviewed and updated as appropriate: allergies, current medications, past medical history, past social history and problem list     Review of Systems   Constitutional: Negative for activity change, appetite change and fatigue  HENT: Negative for congestion, ear pain and postnasal drip  Eyes: Negative for visual disturbance  Respiratory: Positive for shortness of breath  Negative for cough and wheezing  Cardiovascular: Negative for chest pain and leg swelling  Gastrointestinal: Negative for abdominal pain, constipation and diarrhea  Genitourinary: Negative for dysuria and frequency  Musculoskeletal: Positive for arthralgias, back pain and gait problem  Negative for joint swelling and myalgias  Skin: Negative for rash and wound  Neurological: Positive for weakness  Negative for dizziness, numbness and headaches  Hematological: Does not bruise/bleed easily  Psychiatric/Behavioral: Negative for confusion  The patient is not nervous/anxious  Objective:      /72   Pulse 82   Temp 97 5 °F (36 4 °C)   Ht 5' 4 5" (1 638 m)   Wt 105 kg (232 lb 3 2 oz)   SpO2 97%   BMI 39 24 kg/m²          Physical Exam  Vitals and nursing note reviewed  Constitutional:       General: She is not in acute distress  Appearance: She is well-developed  HENT:      Head: Normocephalic and atraumatic  Eyes:      Pupils: Pupils are equal, round, and reactive to light  Cardiovascular:      Rate and Rhythm: Normal rate and regular rhythm  Heart sounds: Normal heart sounds  Pulmonary:      Effort: Pulmonary effort is normal       Breath sounds: Normal breath sounds  No wheezing  Abdominal:      General: Bowel sounds are normal       Palpations: Abdomen is soft  Musculoskeletal:         General: No swelling  Right lower leg: No edema  Left lower leg: No edema  Comments: Using cane   Skin:     General: Skin is warm  Findings: No rash  Neurological:      General: No focal deficit present  Mental Status: She is alert and oriented to person, place, and time  Motor: Weakness present  Comments: MMT 4/5 LLE, 5/5 BUE, RLE   Psychiatric:         Mood and Affect: Mood and affect normal  Mood is not anxious or depressed  Behavior: Behavior normal            Labs & imaging results reviewed with patient

## 2022-02-10 NOTE — ASSESSMENT & PLAN NOTE
No recent falls, using cane  (+) weakness LLE  Discussed head imaging, ? Stroke  Unable to do MRI recommend CTA  On warfarin, not on statin

## 2022-02-10 NOTE — ASSESSMENT & PLAN NOTE
Lab Results   Component Value Date    EGFR 55 10/25/2021    EGFR 49 06/03/2021    EGFR 51 12/08/2020    CREATININE 0 98 10/25/2021    CREATININE 1 09 06/03/2021    CREATININE 1 05 12/08/2020     Stable

## 2022-02-15 ENCOUNTER — OFFICE VISIT (OUTPATIENT)
Dept: LAB | Facility: HOSPITAL | Age: 82
End: 2022-02-15
Attending: ORTHOPAEDIC SURGERY
Payer: MEDICARE

## 2022-02-15 ENCOUNTER — ANTICOAG VISIT (OUTPATIENT)
Dept: CARDIOLOGY CLINIC | Facility: CLINIC | Age: 82
End: 2022-02-15

## 2022-02-15 ENCOUNTER — HOSPITAL ENCOUNTER (OUTPATIENT)
Dept: RADIOLOGY | Facility: HOSPITAL | Age: 82
Discharge: HOME | End: 2022-02-15
Attending: ORTHOPAEDIC SURGERY
Payer: MEDICARE

## 2022-02-15 DIAGNOSIS — G89.29 CHRONIC PAIN OF RIGHT KNEE: ICD-10-CM

## 2022-02-15 DIAGNOSIS — M25.561 CHRONIC PAIN OF RIGHT KNEE: ICD-10-CM

## 2022-02-15 DIAGNOSIS — M17.11 PRIMARY OSTEOARTHRITIS OF RIGHT KNEE: ICD-10-CM

## 2022-02-15 LAB
ALBUMIN SERPL BCP-MCNC: 3.7 G/DL (ref 3.5–5)
ALP SERPL-CCNC: 72 U/L (ref 46–116)
ALT SERPL W P-5'-P-CCNC: 29 U/L (ref 12–78)
ANION GAP SERPL CALCULATED.3IONS-SCNC: 5 MMOL/L (ref 4–13)
APTT PPP: 43 SECONDS (ref 23–37)
AST SERPL W P-5'-P-CCNC: 23 U/L (ref 5–45)
ATRIAL RATE: 214 BPM
BASOPHILS # BLD AUTO: 0.04 THOUSANDS/ΜL (ref 0–0.1)
BASOPHILS NFR BLD AUTO: 1 % (ref 0–1)
BILIRUB SERPL-MCNC: 0.84 MG/DL (ref 0.2–1)
BUN SERPL-MCNC: 29 MG/DL (ref 5–25)
CALCIUM SERPL-MCNC: 8.8 MG/DL (ref 8.3–10.1)
CHLORIDE SERPL-SCNC: 103 MMOL/L (ref 100–108)
CO2 SERPL-SCNC: 30 MMOL/L (ref 21–32)
CREAT SERPL-MCNC: 0.88 MG/DL (ref 0.6–1.3)
EOSINOPHIL # BLD AUTO: 0.01 THOUSAND/ΜL (ref 0–0.61)
EOSINOPHIL NFR BLD AUTO: 0 % (ref 0–6)
ERYTHROCYTE [DISTWIDTH] IN BLOOD BY AUTOMATED COUNT: 14.1 % (ref 11.6–15.1)
GFR SERPL CREATININE-BSD FRML MDRD: 62 ML/MIN/1.73SQ M
GLUCOSE P FAST SERPL-MCNC: 95 MG/DL (ref 65–99)
HCT VFR BLD AUTO: 42.2 % (ref 34.8–46.1)
HGB BLD-MCNC: 13.3 G/DL (ref 11.5–15.4)
IMM GRANULOCYTES # BLD AUTO: 0.02 THOUSAND/UL (ref 0–0.2)
IMM GRANULOCYTES NFR BLD AUTO: 0 % (ref 0–2)
LYMPHOCYTES # BLD AUTO: 1.86 THOUSANDS/ΜL (ref 0.6–4.47)
LYMPHOCYTES NFR BLD AUTO: 26 % (ref 14–44)
MCH RBC QN AUTO: 30.5 PG (ref 26.8–34.3)
MCHC RBC AUTO-ENTMCNC: 31.5 G/DL (ref 31.4–37.4)
MCV RBC AUTO: 97 FL (ref 82–98)
MONOCYTES # BLD AUTO: 0.58 THOUSAND/ΜL (ref 0.17–1.22)
MONOCYTES NFR BLD AUTO: 8 % (ref 4–12)
NEUTROPHILS # BLD AUTO: 4.66 THOUSANDS/ΜL (ref 1.85–7.62)
NEUTS SEG NFR BLD AUTO: 65 % (ref 43–75)
NRBC BLD AUTO-RTO: 0 /100 WBCS
PLATELET # BLD AUTO: 198 THOUSANDS/UL (ref 149–390)
PMV BLD AUTO: 11.2 FL (ref 8.9–12.7)
POTASSIUM SERPL-SCNC: 3.9 MMOL/L (ref 3.5–5.3)
PROT SERPL-MCNC: 7.9 G/DL (ref 6.4–8.2)
QRS AXIS: 9 DEGREES
QRSD INTERVAL: 100 MS
QT INTERVAL: 392 MS
QTC INTERVAL: 457 MS
RBC # BLD AUTO: 4.36 MILLION/UL (ref 3.81–5.12)
SODIUM SERPL-SCNC: 138 MMOL/L (ref 136–145)
T WAVE AXIS: -23 DEGREES
VENTRICULAR RATE: 82 BPM
WBC # BLD AUTO: 7.17 THOUSAND/UL (ref 4.31–10.16)

## 2022-02-15 PROCEDURE — 93010 ELECTROCARDIOGRAM REPORT: CPT | Performed by: INTERNAL MEDICINE

## 2022-02-15 PROCEDURE — 87081 CULTURE SCREEN ONLY: CPT

## 2022-02-15 PROCEDURE — 80053 COMPREHEN METABOLIC PANEL: CPT

## 2022-02-15 PROCEDURE — 85025 COMPLETE CBC W/AUTO DIFF WBC: CPT

## 2022-02-15 PROCEDURE — 85730 THROMBOPLASTIN TIME PARTIAL: CPT

## 2022-02-15 PROCEDURE — 71046 X-RAY EXAM CHEST 2 VIEWS: CPT

## 2022-02-15 PROCEDURE — 93005 ELECTROCARDIOGRAM TRACING: CPT

## 2022-02-16 ENCOUNTER — REMOTE DEVICE CLINIC VISIT (OUTPATIENT)
Dept: CARDIOLOGY CLINIC | Facility: CLINIC | Age: 82
End: 2022-02-16
Payer: MEDICARE

## 2022-02-16 DIAGNOSIS — Z95.0 PRESENCE OF PERMANENT CARDIAC PACEMAKER: Primary | ICD-10-CM

## 2022-02-16 LAB — MRSA NOSE QL CULT: NORMAL

## 2022-02-16 PROCEDURE — 93294 REM INTERROG EVL PM/LDLS PM: CPT | Performed by: INTERNAL MEDICINE

## 2022-02-16 PROCEDURE — 93296 REM INTERROG EVL PM/IDS: CPT | Performed by: INTERNAL MEDICINE

## 2022-02-16 NOTE — PROGRESS NOTES
Results for orders placed or performed in visit on 02/16/22   Cardiac EP device report    Narrative    MDT-SINGLE-PM/ NOT MRI CONDITIONAL  CARELINK TRANSMISSION: BATTERY VOLTAGE ADEQUATE  (19 MONS)  25%  ALL AVAILABLE LEAD PARAMETERS WITHIN NORMAL LIMITS  MULTIPLE VHR EPISODES DETECTED, RVR NOTED  PATIENT IS ON WARFARIN AND METOPROLOL TART  NORMAL DEVICE FUNCTION  ---[place]

## 2022-02-20 DIAGNOSIS — E11.42 DIABETIC POLYNEUROPATHY ASSOCIATED WITH TYPE 2 DIABETES MELLITUS (HCC): ICD-10-CM

## 2022-02-21 ENCOUNTER — TELEPHONE (OUTPATIENT)
Dept: GYNECOLOGIC ONCOLOGY | Facility: CLINIC | Age: 82
End: 2022-02-21

## 2022-02-21 RX ORDER — LISINOPRIL 2.5 MG/1
TABLET ORAL
Qty: 90 TABLET | Refills: 0 | Status: SHIPPED | OUTPATIENT
Start: 2022-02-21 | End: 2022-03-01

## 2022-02-22 NOTE — PROGRESS NOTES
Patient called back no current symptoms or concerns since being home, also she had a mammo done and they told her they want her to repeat it to compare tests but she would need a new order for this, she also wants to know if it needs to be the same one or just a regular one ?
Patient notified  Provided Central Scheduling phone # 
Schedule regular annual mammogram next month  This is ordered already, give # to schedule 
Statement Selected

## 2022-02-23 DIAGNOSIS — I48.91 ATRIAL FIBRILLATION, UNSPECIFIED TYPE (HCC): ICD-10-CM

## 2022-02-23 RX ORDER — FUROSEMIDE 40 MG/1
TABLET ORAL
Qty: 90 TABLET | Refills: 3 | Status: SHIPPED | OUTPATIENT
Start: 2022-02-23 | End: 2022-05-23 | Stop reason: SDUPTHER

## 2022-02-24 ENCOUNTER — HOSPITAL ENCOUNTER (EMERGENCY)
Facility: HOSPITAL | Age: 82
Discharge: LEFT BEFORE DISCHARGE | End: 2022-02-24
Attending: EMERGENCY MEDICINE
Payer: MEDICARE

## 2022-02-24 ENCOUNTER — APPOINTMENT (EMERGENCY)
Dept: CT IMAGING | Facility: HOSPITAL | Age: 82
End: 2022-02-24
Payer: MEDICARE

## 2022-02-24 ENCOUNTER — TELEPHONE (OUTPATIENT)
Dept: INTERNAL MEDICINE CLINIC | Facility: CLINIC | Age: 82
End: 2022-02-24

## 2022-02-24 VITALS
TEMPERATURE: 98 F | DIASTOLIC BLOOD PRESSURE: 68 MMHG | HEART RATE: 81 BPM | SYSTOLIC BLOOD PRESSURE: 155 MMHG | OXYGEN SATURATION: 98 %

## 2022-02-24 DIAGNOSIS — R26.2 DIFFICULTY WALKING: ICD-10-CM

## 2022-02-24 DIAGNOSIS — R51.9 PRESSURE IN HEAD: Primary | ICD-10-CM

## 2022-02-24 LAB
2HR DELTA HS TROPONIN: -1 NG/L
ALBUMIN SERPL BCP-MCNC: 3.8 G/DL (ref 3.5–5)
ALP SERPL-CCNC: 61 U/L (ref 46–116)
ALT SERPL W P-5'-P-CCNC: 34 U/L (ref 12–78)
ANION GAP SERPL CALCULATED.3IONS-SCNC: 7 MMOL/L (ref 4–13)
AST SERPL W P-5'-P-CCNC: 48 U/L (ref 5–45)
BACTERIA UR QL AUTO: ABNORMAL /HPF
BASOPHILS # BLD AUTO: 0.04 THOUSANDS/ΜL (ref 0–0.1)
BASOPHILS NFR BLD AUTO: 1 % (ref 0–1)
BILIRUB SERPL-MCNC: 0.73 MG/DL (ref 0.2–1)
BILIRUB UR QL STRIP: NEGATIVE
BUN SERPL-MCNC: 31 MG/DL (ref 5–25)
CALCIUM SERPL-MCNC: 9.3 MG/DL (ref 8.3–10.1)
CARDIAC TROPONIN I PNL SERPL HS: 8 NG/L
CARDIAC TROPONIN I PNL SERPL HS: 9 NG/L
CHLORIDE SERPL-SCNC: 103 MMOL/L (ref 100–108)
CLARITY UR: CLEAR
CO2 SERPL-SCNC: 28 MMOL/L (ref 21–32)
COLOR UR: YELLOW
CREAT SERPL-MCNC: 0.87 MG/DL (ref 0.6–1.3)
EOSINOPHIL # BLD AUTO: 0 THOUSAND/ΜL (ref 0–0.61)
EOSINOPHIL NFR BLD AUTO: 0 % (ref 0–6)
ERYTHROCYTE [DISTWIDTH] IN BLOOD BY AUTOMATED COUNT: 14.1 % (ref 11.6–15.1)
GFR SERPL CREATININE-BSD FRML MDRD: 63 ML/MIN/1.73SQ M
GLUCOSE SERPL-MCNC: 92 MG/DL (ref 65–140)
GLUCOSE UR STRIP-MCNC: NEGATIVE MG/DL
HCT VFR BLD AUTO: 40.6 % (ref 34.8–46.1)
HGB BLD-MCNC: 13.4 G/DL (ref 11.5–15.4)
HGB UR QL STRIP.AUTO: NEGATIVE
IMM GRANULOCYTES # BLD AUTO: 0.02 THOUSAND/UL (ref 0–0.2)
IMM GRANULOCYTES NFR BLD AUTO: 0 % (ref 0–2)
INR PPP: 2.11 (ref 0.84–1.19)
KETONES UR STRIP-MCNC: NEGATIVE MG/DL
LEUKOCYTE ESTERASE UR QL STRIP: ABNORMAL
LYMPHOCYTES # BLD AUTO: 1.82 THOUSANDS/ΜL (ref 0.6–4.47)
LYMPHOCYTES NFR BLD AUTO: 22 % (ref 14–44)
MCH RBC QN AUTO: 30.6 PG (ref 26.8–34.3)
MCHC RBC AUTO-ENTMCNC: 33 G/DL (ref 31.4–37.4)
MCV RBC AUTO: 93 FL (ref 82–98)
MONOCYTES # BLD AUTO: 0.56 THOUSAND/ΜL (ref 0.17–1.22)
MONOCYTES NFR BLD AUTO: 7 % (ref 4–12)
NEUTROPHILS # BLD AUTO: 5.71 THOUSANDS/ΜL (ref 1.85–7.62)
NEUTS SEG NFR BLD AUTO: 70 % (ref 43–75)
NITRITE UR QL STRIP: NEGATIVE
NON-SQ EPI CELLS URNS QL MICRO: ABNORMAL /HPF
NRBC BLD AUTO-RTO: 0 /100 WBCS
PH UR STRIP.AUTO: 5 [PH] (ref 4.5–8)
PLATELET # BLD AUTO: 208 THOUSANDS/UL (ref 149–390)
PMV BLD AUTO: 11.4 FL (ref 8.9–12.7)
POTASSIUM SERPL-SCNC: 5.3 MMOL/L (ref 3.5–5.3)
PROT SERPL-MCNC: 8.1 G/DL (ref 6.4–8.2)
PROT UR STRIP-MCNC: NEGATIVE MG/DL
PROTHROMBIN TIME: 23.4 SECONDS (ref 11.6–14.5)
RBC # BLD AUTO: 4.38 MILLION/UL (ref 3.81–5.12)
RBC #/AREA URNS AUTO: ABNORMAL /HPF
SODIUM SERPL-SCNC: 138 MMOL/L (ref 136–145)
SP GR UR STRIP.AUTO: 1.01 (ref 1–1.03)
UROBILINOGEN UR QL STRIP.AUTO: 0.2 E.U./DL
WBC # BLD AUTO: 8.15 THOUSAND/UL (ref 4.31–10.16)
WBC #/AREA URNS AUTO: ABNORMAL /HPF

## 2022-02-24 PROCEDURE — 70496 CT ANGIOGRAPHY HEAD: CPT

## 2022-02-24 PROCEDURE — 93005 ELECTROCARDIOGRAM TRACING: CPT

## 2022-02-24 PROCEDURE — 36415 COLL VENOUS BLD VENIPUNCTURE: CPT | Performed by: PHYSICIAN ASSISTANT

## 2022-02-24 PROCEDURE — G1004 CDSM NDSC: HCPCS

## 2022-02-24 PROCEDURE — 99285 EMERGENCY DEPT VISIT HI MDM: CPT | Performed by: PHYSICIAN ASSISTANT

## 2022-02-24 PROCEDURE — 80053 COMPREHEN METABOLIC PANEL: CPT | Performed by: PHYSICIAN ASSISTANT

## 2022-02-24 PROCEDURE — 84484 ASSAY OF TROPONIN QUANT: CPT | Performed by: PHYSICIAN ASSISTANT

## 2022-02-24 PROCEDURE — 99284 EMERGENCY DEPT VISIT MOD MDM: CPT

## 2022-02-24 PROCEDURE — 81001 URINALYSIS AUTO W/SCOPE: CPT

## 2022-02-24 PROCEDURE — 85025 COMPLETE CBC W/AUTO DIFF WBC: CPT | Performed by: PHYSICIAN ASSISTANT

## 2022-02-24 PROCEDURE — 85610 PROTHROMBIN TIME: CPT | Performed by: PHYSICIAN ASSISTANT

## 2022-02-24 RX ADMIN — IOHEXOL 85 ML: 350 INJECTION, SOLUTION INTRAVENOUS at 15:50

## 2022-02-24 NOTE — TELEPHONE ENCOUNTER
Pt states was on eliptical for about 5 minutes, symptoms have passed for now but was having difficulty walking so she decided to go on the elliptical  Pt states she has been drinking fluids regularly no dehydration  Denies dizziness

## 2022-02-24 NOTE — TELEPHONE ENCOUNTER
Pt was at the gym this morning when she starting feeling a little "goofy"  Patient states her head feels full (like when you have a cold)  She was on the elliptical machine for a few minutes and then experienced leg weakness and had to stop  No shortness of breath  No chest pain  No vision problems

## 2022-02-24 NOTE — ED PROVIDER NOTES
History  Chief Complaint   Patient presents with    Medical Problem     pt was at the gym and was on the elipitcal for 5 minutes and said that someone told her she "looked grey," pt stated this lasted for 2 hours     The patient is a 53-year-old female with history of AFib and hypertension who presents to the emergency department for evaluation of head fullness and difficulty ambulating  The patient states that she went to the gym around 9:00 a m  She noted that she had sudden onset of head fullness" and difficulty walking  She states it just felt like her legs would not work right  She was able to ultimately get on an elliptical machine, however her  then advised her that she did not appear well and appeared gray in color  The patient states that she went home after that, and she continue to experience these symptoms for approximately 2 hours  They did ultimately resolve, and she now feels completely back to normal   However, she states she decided to come in because of how unwell her  said that she looked at the gym  She denies any associated chest pain or shortness of breath  She does have a history of AFib, and she is on Coumadin  She denies fever, chills, nausea, vomiting, rash, headache, dizziness or lightheadedness  History provided by:  Patient   used: No    Medical Problem  Associated symptoms: no abdominal pain, no chest pain, no cough, no diarrhea, no ear pain, no fever, no headaches, no nausea, no rash, no shortness of breath, no sore throat and no vomiting        Prior to Admission Medications   Prescriptions Last Dose Informant Patient Reported? Taking?    Calcium Acetate, Phos Binder, (CALCIUM ACETATE PO)  Self Yes No   Sig: Take by mouth daily     Flovent  MCG/ACT inhaler  Self No No   Sig: INHALE 2 PUFFS 2 (TWO) TIMES A DAY RINSE MOUTH AFTER USE    acetaminophen (TYLENOL) 650 mg CR tablet  Self Yes No   Sig: Take 1,300 mg by mouth daily as needed     clobetasol (TEMOVATE) 0 05 % ointment  Self No No   Sig: Apply once weekly to the affected area   famotidine (PEPCID) 40 MG tablet  Self No No   Sig: TAKE 1 TABLET BY MOUTH EVERY DAY   fluticasone (FLONASE) 50 mcg/act nasal spray  Self No No   Sig: SPRAY 1 SPRAY INTO EACH NOSTRIL EVERY DAY   furosemide (LASIX) 40 mg tablet   No No   Sig: TAKE 1 TABLET BY MOUTH EVERY DAY   gabapentin (NEURONTIN) 800 mg tablet  Self No No   Sig: Take 1 tablet (800 mg total) by mouth 4 (four) times a day   lisinopril (ZESTRIL) 2 5 mg tablet   No No   Sig: TAKE 1 TABLET BY MOUTH EVERY DAY   loratadine (CLARITIN) 10 mg tablet  Self Yes No   Sig: Take 10 mg by mouth daily   magnesium 30 MG tablet  Self Yes No   Sig: Take 30 mg by mouth 2 (two) times a day   metoprolol tartrate (LOPRESSOR) 50 mg tablet  Self No No   Sig: Take 1 tablet (50 mg total) by mouth every 12 (twelve) hours   multivitamin (THERAGRAN) TABS  Self Yes No   Sig: Take 1 tablet by mouth daily   mupirocin (BACTROBAN) 2 % ointment  Self Yes No   Sig:     pramipexole (MIRAPEX) 0 5 mg tablet   No No   Si FULL TABLETS TWICE A DAY AT 5:00 P  M  AND 10:00 P  M    sodium chloride (OCEAN) 0 65 % nasal spray  Self No No   Si spray into each nostril 2 (two) times a day as needed for rhinitis   warfarin (COUMADIN) 7 5 mg tablet  Self No No   Sig: TAKE 1 TABLET BY MOUTH EVERY DAY   warfarin (Coumadin) 5 mg tablet  Self No No   Sig: TAKE 2 TABS BY MOUTH DAILY OR AS DIRECTED BY PHYSICIAN      Facility-Administered Medications: None       Past Medical History:   Diagnosis Date    [facility]     Carrero's esophagus     last assessed: 2018    BRCA1 negative     BRCA2 negative     Breast cancer ([facility] [address] )     stage 1 (left), given adjuvant radiation with Arimidex x 5 years    Cancer [facility])     Left Breast, Lumpectomy    Cataract     last assessed: 3/11/2014    Dysplasia of toenail     last assessed: 2017    Esophageal reflux     GERD (gastroesophageal reflux disease)     Gross hematuria     last assessed: 2/19/2015    Hematuria     Hiatal hernia     History of radiation therapy     Hypertension     Irregular heart beat     AFIB    Mixed sensory-motor polyneuropathy     Neuropathy     Obesity     Pacemaker     Paroxysmal atrial fibrillation (HCC)     Peripheral neuropathy     Rectal bleeding     Restless leg syndrome     Shortness of breath     last assessed: 1/11/2016       Past Surgical History:   Procedure Laterality Date    BREAST BIOPSY Left 2006    BREAST LUMPECTOMY Left 2006    onset: 2006    BREAST SURGERY      CARDIAC PACEMAKER PLACEMENT      x 3 2006    CATARACT EXTRACTION      COLONOSCOPY      CYSTOSCOPY  04/04/2014    diagnostic    HYSTERECTOMY      ALISON BSO; due to fibroid uterus; age 38   Preeti Pall KNEE CARTILAGE SURGERY      excision lesion of meniscus or capsule knee    KNEE SURGERY      OOPHORECTOMY Bilateral     OTHER SURGICAL HISTORY      radiation therapy    OH COLONOSCOPY FLX DX W/COLLJ SPEC WHEN PFRMD N/A 2/8/2017    Procedure: COLONOSCOPY;  Surgeon: Josh Trotter MD;  [facility]; Service: Gastroenterology    OH ESOPHAGOGASTRODUODENOSCOPY TRANSORAL DIAGNOSTIC N/A 9/20/2017    Procedure: ESOPHAGOGASTRODUODENOSCOPY (EGD); Surgeon: Kalpana Trent MD;  [facility];   Service: Gastroenterology    OH TOTAL KNEE ARTHROPLASTY Left 8/17/2020    Procedure: ARTHROPLASTY KNEE TOTAL;  Surgeon: Yaima Banda DO;  [facility]: [facility];  Service: Orthopedics    UPPER GASTROINTESTINAL ENDOSCOPY         Family History   Problem Relation Age of Onset    Hypertension Mother     Heart disease Father     Aneurysm Father     Coronary artery disease Father         in his 76s with aneurysm    Aortic aneurysm Father         abdominal    Scleroderma Sister     Breast cancer Sister 76    Hypertension Sister     Cancer Sister     No Known Problems Son     No Known Problems Son     Testicular cancer Son 39  Thyroid cancer Son 45    Colon cancer Maternal Aunt     Colon cancer Maternal Aunt     Breast cancer Other 48        kaylee's daughter    Alcohol abuse Neg Hx     Substance Abuse Neg Hx     Mental illness Neg Hx     Depression Neg Hx      I have reviewed and agree with the history as documented  E-Cigarette/Vaping    E-Cigarette Use Never User      E-Cigarette/Vaping Substances    Nicotine No     THC No     CBD No     Flavoring No     Other No     Unknown No      Social History     Tobacco Use    Smoking status: Former Smoker     Packs/day: 1 00     Years: 25 00     Pack years: 25 00     Types: Cigarettes     Quit date: 1980     Years since quittin 4    Smokeless tobacco: Never Used    Tobacco comment: Quit over 30 years ago; quit age 41   Vaping Use    Vaping Use: Never used   Substance Use Topics    Alcohol use: Not Currently    Drug use: No       Review of Systems   Constitutional: Negative for chills and fever  HENT: Negative for ear pain and sore throat  Eyes: Negative for redness and visual disturbance  Respiratory: Negative for cough and shortness of breath  Cardiovascular: Negative for chest pain  Gastrointestinal: Negative for abdominal pain, diarrhea, nausea and vomiting  Genitourinary: Negative for dysuria and hematuria  Musculoskeletal: Positive for gait problem  Negative for back pain, neck pain and neck stiffness  Skin: Negative for color change and rash  Neurological: Negative for dizziness, light-headedness and headaches  All other systems reviewed and are negative  Physical Exam  Physical Exam  Vitals and nursing note reviewed  Constitutional:       General: She is not in acute distress  Appearance: She is well-developed  She is not ill-appearing or toxic-appearing  HENT:      Head: Normocephalic and atraumatic  Mouth/Throat:      Pharynx: Uvula midline     Eyes:      General: Lids are normal       Conjunctiva/sclera: Conjunctivae normal    Cardiovascular:      Rate and Rhythm: Normal rate and regular rhythm  Heart sounds: Normal heart sounds  Pulmonary:      Effort: Pulmonary effort is normal       Breath sounds: Normal breath sounds  Abdominal:      General: There is no distension  Palpations: Abdomen is soft  Tenderness: There is no abdominal tenderness  Musculoskeletal:      Cervical back: Normal range of motion and neck supple  Skin:     General: Skin is warm and dry  Neurological:      Mental Status: She is alert and oriented to person, place, and time  Cranial Nerves: Cranial nerves are intact  Sensory: Sensation is intact  Motor: Motor function is intact  Coordination: Coordination is intact  Gait: Gait is intact  Vital Signs  ED Triage Vitals [02/24/22 1349]   Temperature Pulse Resp Blood Pressure SpO2   98 °F (36 7 °C) 81 -- 155/68 98 %      Temp Source Heart Rate Source Patient Position - Orthostatic VS BP Location FiO2 (%)   Oral -- Sitting Right arm --      Pain Score       --           Vitals:    02/24/22 1349   BP: 155/68   Pulse: 81   Patient Position - Orthostatic VS: Sitting         Visual Acuity      ED Medications  Medications   iohexol (OMNIPAQUE) 350 MG/ML injection (SINGLE-DOSE) 100 mL (85 mL Intravenous Given 2/24/22 1550)       Diagnostic Studies  Results Reviewed     Procedure Component Value Units Date/Time    HS Troponin I 2hr [[id number]]  (Normal) Collected: 02/24/22 1617    Lab Status: Final result Specimen: Blood from Arm, Right Updated: 02/24/22 1651     hs TnI 2hr 8 ng/L      Delta 2hr hsTnI -1 ng/L     Urine Microscopic [[id number]] Collected: 02/24/22 1427    Lab Status:  In process Specimen: Urine, Clean Catch Updated: 02/24/22 1605    HS Troponin I 4hr [[id number]]     Lab Status: No result Specimen: Blood     HS Troponin 0hr (reflex protocol) [[id number]]  (Normal) Collected: 02/24/22 1418    Lab Status: Final result Specimen: Blood from Arm, Right Updated: 02/24/22 1501     hs TnI 0hr 9 ng/L     Comprehensive metabolic panel [[id number]]  (Abnormal) Collected: 02/24/22 1418    Lab Status: Final result Specimen: Blood from Arm, Right Updated: 02/24/22 1457     Sodium 138 mmol/L      Potassium 5 3 mmol/L      Chloride 103 mmol/L      CO2 28 mmol/L      ANION GAP 7 mmol/L      BUN 31 mg/dL      Creatinine 0 87 mg/dL      Glucose 92 mg/dL      Calcium 9 3 mg/dL      AST 48 U/L      ALT 34 U/L      Alkaline Phosphatase 61 U/L      Total Protein 8 1 g/dL      Albumin 3 8 g/dL      Total Bilirubin 0 73 mg/dL      eGFR 63 ml/min/1 73sq m     Narrative:      National Kidney Disease Foundation guidelines for Chronic Kidney Disease (CKD):     Stage 1 with normal or high GFR (GFR > 90 mL/min/1 73 square meters)    Stage 2 Mild CKD (GFR = 60-89 mL/min/1 73 square meters)    Stage 3A Moderate CKD (GFR = 45-59 mL/min/1 73 square meters)    Stage 3B Moderate CKD (GFR = 30-44 mL/min/1 73 square meters)    Stage 4 Severe CKD (GFR = 15-29 mL/min/1 73 square meters)    Stage 5 End Stage CKD (GFR <15 mL/min/1 73 square meters)  Note: GFR calculation is accurate only with a steady state creatinine    Protime-INR [[id number]]  (Abnormal) Collected: 02/24/22 1418    Lab Status: Final result Specimen: Blood from Arm, Right Updated: 02/24/22 1447     Protime 23 4 seconds      INR 2 11    CBC and differential [[id number]] Collected: 02/24/22 1418    Lab Status: Final result Specimen: Blood from Arm, Right Updated: 02/24/22 1438     WBC 8 15 Thousand/uL      RBC 4 38 Million/uL      Hemoglobin 13 4 g/dL      Hematocrit 40 6 %      MCV 93 fL      MCH 30 6 pg      MCHC 33 0 g/dL      RDW 14 1 %      MPV 11 4 fL      Platelets 322 Thousands/uL      nRBC 0 /100 WBCs      Neutrophils Relative 70 %      Immat GRANS % 0 %      Lymphocytes Relative 22 %      Monocytes Relative 7 %      Eosinophils Relative 0 %      Basophils Relative 1 %      Neutrophils Absolute 5 71 Thousands/µL Immature Grans Absolute 0 02 Thousand/uL      Lymphocytes Absolute 1 82 Thousands/µL      Monocytes Absolute 0 56 Thousand/µL      Eosinophils Absolute 0 00 Thousand/µL      Basophils Absolute 0 04 Thousands/µL     Urine Macroscopic, POC [[id number]]  (Abnormal) Collected: 02/24/22 1427    Lab Status: Final result Specimen: Urine Updated: 02/24/22 1428     Color, UA Yellow     Clarity, UA Clear     pH, UA 5 0     Leukocytes, UA Small     Nitrite, UA Negative     Protein, UA Negative mg/dl      Glucose, UA Negative mg/dl      Ketones, UA Negative mg/dl      Urobilinogen, UA 0 2 E U /dl      Bilirubin, UA Negative     Blood, UA Negative     Specific Gravity, UA 1 010    Narrative:      CLINITEK RESULT                 CTA head with and without contrast   Final Result by Mike Real MD (02/24 1625)      No acute intracranial abnormality  Negative CTA head for large vessel occlusion, dissection, aneurysm, or high-grade stenosis  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, compatible with congenital variation  Workstation performed: [id number]                    Procedures  ECG 12 Lead Documentation Only    Date/Time: 2/24/2022 5:14 PM  Performed by: Huan Balderrama PA-C  Authorized by: Dottie Arias PA-C     Comments:      Atrial fibrillation at 68  Normal axis  No acute ST-T changes  ED Course  ED Course as of 02/24/22 1719   Thu Feb 24, 2022   1537 Updated patient on results I have so far  She is very anxious to leave because that whether supposed to be setting in  I advised that she can sign out against medical advice at this time, however I recommend further workup  She ultimately agreed to stay for the time being  1633 I recommended admission for observation to the patient, however she wishes to sign out against medical advice at this time    She acknowledges the risks of signing out against medical advice including worsening illness,, or death  I do feel she is of sound mind to make this decision  Her  is also present at this time  She states that she will call her cardiologist 1st thing Monday  She was given strict return to ED precautions  MDM  Number of Diagnoses or Management Options  Difficulty walking: new and requires workup  Pressure in head: new and requires workup  Diagnosis management comments: Patient presents for evaluation of sudden onset difficulty ambulating well at the gym today with associated head pressure  Patient states symptoms lasted approximately 2 hours, but she has had complete resolution of symptoms at this time  Differential includes but is not limited to anxiety versus arrhythmia versus ACS versus TIA versus CVA  Labs, imaging and EKG were ordered  Labs reviewed with no significant findings  Initial troponin level was 9  Repeat troponin level came back at 8 with a delta change of -1  EKG is without acute ischemic change  CTA of head and neck reviewed with no acute findings  All results were discussed with the patient  I do have ongoing concern with the incident the patient described today  I ultimately feel she should come in for observation and further workup for possible arrhythmia versus TIA  However, she is adamantly refusing admission today  She wishes to go home  She states she will call her cardiologist 1st thing next week  Patient did sign AMA form  I feel she was of sound mind when doing so  She acknowledged understanding of risks of leaving against medical advice including worsening illness, permanent disability, coma or death  I encouraged her to return to the ED if she develops any further symptoms  Patient signed out against medical advice  The time of her departure, she remained stable and was ambulating without assistance         Amount and/or Complexity of Data Reviewed  Clinical lab tests: ordered and reviewed  Tests in the radiology section of CPT®: reviewed and ordered  Decide to obtain previous medical records or to obtain history from someone other than the patient: yes  Review and summarize past medical records: yes  Discuss the patient with other providers: yes (Dr Yuniel Flores)    Risk of Complications, Morbidity, and/or Mortality  Presenting problems: moderate  Diagnostic procedures: moderate  Management options: moderate    Patient Progress  Patient progress: stable      Disposition  Final diagnoses:   Pressure in head   Difficulty walking     Time reflects when diagnosis was documented in both MDM as applicable and the Disposition within this note     Time User Action Codes Description Comment    2/24/2022  4:35 PM Poornima Hilger Add [R51 9] Pressure in head     2/24/2022  4:35 PM Poornima Hilger Add [R26 2] Difficulty walking       ED Disposition     ED Disposition Condition Date/Time Comment    DEWEY Mackritika Feb 24, 2022  4:35 PM Date: 2/24/2022  Patient: Alisia Fairbanks  Admitted: 2/24/2022  1:52 PM  Attending Provider: Aurea Coronel MD    Alisia Fairbanks or her authorized caregiver has made the decision for the patient to leave the emergency department against the ad vice of [employer], BARB  She or her authorized caregiver has been informed and understands the inherent risks, including death, coma or permanent disability  She or her authorized caregiver has decided to accept the responsibility for this d [facility]  Zoila Ortiz and all necessary parties have been advised that she may return for further evaluation or treatment  Her condition at time of discharge was stable    Alisia Fairbanks had current vital signs as follows:  /68 (BP Locat ion: Right arm)   Pulse 81   Temp 98 °F (36 7 °C) (Oral)         Follow-up Information     Follow up With Specialties Details Why Contact Info Additional 31 Bekah Ferrera Meera Card MD Internal Medicine Schedule an appointment as soon as possible for a visit   [address]  [address]  [phone]       Call your cardiologist 1st thing Monday           [facility] Emergency Medicine  If symptoms worsen [address] [facility] [address] [facility] Emergency Department, [address], [place], [place], [address]          Discharge Medication List as of 2/24/2022  4:36 PM      CONTINUE these medications which have NOT CHANGED    Details   acetaminophen (TYLENOL) 650 mg CR tablet Take 1,300 mg by mouth daily as needed  , Historical Med      Calcium Acetate, Phos Binder, (CALCIUM ACETATE PO) Take by mouth daily  , Historical Med      clobetasol (TEMOVATE) 0 05 % ointment Apply once weekly to the affected area, Normal      famotidine (PEPCID) 40 MG tablet TAKE 1 TABLET BY MOUTH EVERY DAY, Normal      Flovent  MCG/ACT inhaler INHALE 2 PUFFS 2 (TWO) TIMES A DAY RINSE MOUTH AFTER USE , Starting Mon 8/23/2021, Normal      fluticasone (FLONASE) 50 mcg/act nasal spray SPRAY 1 SPRAY INTO EACH NOSTRIL EVERY DAY, Normal      furosemide (LASIX) 40 mg tablet TAKE 1 TABLET BY MOUTH EVERY DAY, Normal      gabapentin (NEURONTIN) 800 mg tablet Take 1 tablet (800 mg total) by mouth 4 (four) times a day, Starting Tue 1/11/2022, Normal      lisinopril (ZESTRIL) 2 5 mg tablet TAKE 1 TABLET BY MOUTH EVERY DAY, Normal      loratadine (CLARITIN) 10 mg tablet Take 10 mg by mouth daily, Historical Med      magnesium 30 MG tablet Take 30 mg by mouth 2 (two) times a day, Historical Med      metoprolol tartrate (LOPRESSOR) 50 mg tablet Take 1 tablet (50 mg total) by mouth every 12 (twelve) hours, Starting Mon 10/4/2021, Normal      multivitamin (THERAGRAN) TABS Take 1 tablet by mouth daily, Historical Med      mupirocin (BACTROBAN) 2 % ointment  , Starting Tue 11/16/2021, Historical Med      pramipexole (MIRAPEX) 0 5 mg tablet 2 FULL TABLETS TWICE A DAY AT 5:00 P  M  AND 10:00 P M , Normal      sodium chloride (OCEAN) 0 65 % nasal spray 1 spray into each nostril 2 (two) times a day as needed for rhinitis, Starting Thu 6/3/2021, Normal      !! warfarin (Coumadin) 5 mg tablet TAKE 2 TABS BY MOUTH DAILY OR AS DIRECTED BY PHYSICIAN, Normal      !! warfarin (COUMADIN) 7 5 mg tablet TAKE 1 TABLET BY MOUTH EVERY DAY, Normal       !! - Potential duplicate medications found  Please discuss with provider  No discharge procedures on file      PDMP Review       Value Time User    PDMP Reviewed  Yes 8/18/2020 11:44 AM Junior Izzy PA-C          ED Provider  Electronically Signed by           Yuriy Mcclure PA-C  02/24/22 [phone]

## 2022-02-24 NOTE — TELEPHONE ENCOUNTER
Have you been drinking enough fluids everyday? Any dizziness? How long where you on the elliptical before feeling unwell?

## 2022-02-25 ENCOUNTER — TELEPHONE (OUTPATIENT)
Dept: CARDIOLOGY CLINIC | Facility: CLINIC | Age: 82
End: 2022-02-25

## 2022-02-25 LAB
ATRIAL RATE: 92 BPM
QRS AXIS: 62 DEGREES
QRSD INTERVAL: 96 MS
QT INTERVAL: 356 MS
QTC INTERVAL: 406 MS
T WAVE AXIS: -30 DEGREES
VENTRICULAR RATE: 78 BPM

## 2022-02-25 PROCEDURE — 93010 ELECTROCARDIOGRAM REPORT: CPT | Performed by: INTERNAL MEDICINE

## 2022-02-25 NOTE — TELEPHONE ENCOUNTER
Pt states she did end up going to the ER yesterday   They did BW and CTA of head - everything normal     They did recommend staying overnight for heart monitor and pt refused   She was advised to call pur office and cardiology     Patient denies any symptoms as of today other than fatigue

## 2022-02-25 NOTE — TELEPHONE ENCOUNTER
Please keep appt with Francy Rivera next week, call cardiology for an appointment  I reviewed ER labs, CTA  Monitor for any palpitations or fast heart rate

## 2022-02-25 NOTE — TELEPHONE ENCOUNTER
Michael Miller called was in the hospital yesterday had trouble walking, and didn't feel well  They recommended her stay over and monitor but she refused  Today she feels ok just really tired  Advised to sent over transmission; Do you want an testing done at this time?     Please advise

## 2022-02-28 ENCOUNTER — TELEPHONE (OUTPATIENT)
Dept: OBGYN CLINIC | Facility: HOSPITAL | Age: 82
End: 2022-02-28

## 2022-02-28 NOTE — TELEPHONE ENCOUNTER
Spoke to pt and advised to increase metoprolol to 75 mg twice a day, and monitor bp due to AF  Pt doesn't need refill at this time   Will call when needs refill   Verbalized understanding

## 2022-02-28 NOTE — TELEPHONE ENCOUNTER
Assessment confirmed from 2020 TKA  Changes made as needed per conversation today with patient  Preoperative Elective Admission Assessment-Spoke with pt     Medicare- ELIZAWA     Previous surgery 2020- DC home with outpatient PT      Living Situation: lives with , Pati Zepeda, in a multi-level home; full bathroom on 2nd level with step-in tub that has grab bars; 1/2 bath on 1st floor                     Steps: #3 steps to enter, approximately #15 steps to 2nd level  With railing on left hand side       First Floor Setup: Yes with a half bathroom  Plans to stay first floor as she did with TKA in 2020     Post-op Caregiver: Pati-- [phone]     Post-op Transport: [facility]     Outpatient Physical Therapy Site: [facility]     DME: Pt has a RW, BSC, and cane      Patient's Current Level of Function: pt currently ambulates independently and is independent with her ADLs     Medication Management: Pt self manages her meds using a pillbox                     Preferred Pharmacy: [facility]                     Blood Management Vitamins: Not prescribed, will send to surgeon                       Post-op anticoagulant: TBD by surgeon     DC Plan: Home with OP PT  Pt educated that our goal, if at all possible, is to appropriately discharge patient based off their post-op function while striving to maintain maximal independence   If possible, the goal is to discharge patient to home and for them to attend outpatient physical therapy                      Barriers to DC identified preoperatively:  None     BMI: 39 24       Patient Education: Pt educated on post op pain, early mobilization (POD0), average LOS, and goals for outpatient PT

## 2022-02-28 NOTE — TELEPHONE ENCOUNTER
Did you receive the transmilsson the other day?   Should I let her know any information/    Please advise

## 2022-03-01 DIAGNOSIS — E11.42 DIABETIC POLYNEUROPATHY ASSOCIATED WITH TYPE 2 DIABETES MELLITUS (HCC): ICD-10-CM

## 2022-03-01 DIAGNOSIS — M25.561 CHRONIC PAIN OF RIGHT KNEE: ICD-10-CM

## 2022-03-01 DIAGNOSIS — M17.11 PRIMARY OSTEOARTHRITIS OF RIGHT KNEE: Primary | ICD-10-CM

## 2022-03-01 DIAGNOSIS — G89.29 CHRONIC PAIN OF RIGHT KNEE: ICD-10-CM

## 2022-03-01 RX ORDER — LISINOPRIL 2.5 MG/1
TABLET ORAL
Qty: 90 TABLET | Refills: 0 | Status: SHIPPED | OUTPATIENT
Start: 2022-03-01 | End: 2022-04-14 | Stop reason: HOSPADM

## 2022-03-01 RX ORDER — ZINC SULFATE 50(220)MG
220 CAPSULE ORAL DAILY
Qty: 30 CAPSULE | Refills: 0 | Status: SHIPPED | OUTPATIENT
Start: 2022-03-01 | End: 2022-03-29 | Stop reason: HOSPADM

## 2022-03-01 RX ORDER — MELATONIN
1000 DAILY
Qty: 30 TABLET | Refills: 0 | Status: SHIPPED | OUTPATIENT
Start: 2022-03-01 | End: 2022-03-29 | Stop reason: HOSPADM

## 2022-03-01 RX ORDER — FOLIC ACID 1 MG/1
1 TABLET ORAL DAILY
Qty: 30 TABLET | Refills: 0 | Status: SHIPPED | OUTPATIENT
Start: 2022-03-01 | End: 2022-03-24

## 2022-03-01 RX ORDER — FERROUS SULFATE TAB EC 324 MG (65 MG FE EQUIVALENT) 324 (65 FE) MG
324 TABLET DELAYED RESPONSE ORAL
Qty: 30 TABLET | Refills: 0 | Status: SHIPPED | OUTPATIENT
Start: 2022-03-01 | End: 2022-03-29 | Stop reason: HOSPADM

## 2022-03-02 ENCOUNTER — OFFICE VISIT (OUTPATIENT)
Dept: INTERNAL MEDICINE CLINIC | Facility: CLINIC | Age: 82
End: 2022-03-02
Payer: MEDICARE

## 2022-03-02 VITALS
HEIGHT: 65 IN | BODY MASS INDEX: 37.69 KG/M2 | HEART RATE: 93 BPM | WEIGHT: 226.2 LBS | TEMPERATURE: 97.1 F | SYSTOLIC BLOOD PRESSURE: 124 MMHG | DIASTOLIC BLOOD PRESSURE: 84 MMHG | OXYGEN SATURATION: 97 %

## 2022-03-02 DIAGNOSIS — Z01.818 PRE-OP EVALUATION: Primary | ICD-10-CM

## 2022-03-02 DIAGNOSIS — I10 ESSENTIAL HYPERTENSION: ICD-10-CM

## 2022-03-02 DIAGNOSIS — K22.719 BARRETT'S ESOPHAGUS WITH DYSPLASIA: ICD-10-CM

## 2022-03-02 DIAGNOSIS — G25.81 RLS (RESTLESS LEGS SYNDROME): ICD-10-CM

## 2022-03-02 DIAGNOSIS — M17.11 PRIMARY OSTEOARTHRITIS OF RIGHT KNEE: ICD-10-CM

## 2022-03-02 DIAGNOSIS — I48.20 CHRONIC ATRIAL FIBRILLATION (HCC): ICD-10-CM

## 2022-03-02 DIAGNOSIS — N18.2 CHRONIC KIDNEY DISEASE (CKD), STAGE II (MILD): ICD-10-CM

## 2022-03-02 PROCEDURE — 99214 OFFICE O/P EST MOD 30 MIN: CPT | Performed by: NURSE PRACTITIONER

## 2022-03-02 RX ORDER — CLINDAMYCIN HYDROCHLORIDE 300 MG/1
CAPSULE ORAL
COMMUNITY
Start: 2022-02-21 | End: 2022-03-29 | Stop reason: HOSPADM

## 2022-03-02 RX ORDER — METOPROLOL TARTRATE 50 MG/1
75 TABLET, FILM COATED ORAL 2 TIMES DAILY
Qty: 180 TABLET
Start: 2022-03-02 | End: 2022-03-21 | Stop reason: SDUPTHER

## 2022-03-02 NOTE — PROGRESS NOTES
Assessment/Plan:    Carrero's esophagus with dysplasia  On pepcid  Atrial fibrillation (HCC) [I48 91]  Stable  On metoprolol and warfarin  Pre op instructions for warfarin per cardiology     Chronic kidney disease (CKD), stage II (mild)  Lab Results   Component Value Date    EGFR 63 02/24/2022    EGFR 62 02/15/2022    EGFR 55 10/25/2021    CREATININE 0 87 02/24/2022    CREATININE 0 88 02/15/2022    CREATININE 0 98 10/25/2021   stable  RLS (restless legs syndrome)  Stable      Primary osteoarthritis of right knee  Scheduled for TKR on 3/29  Reviewed pre op labs/ekg, stable  Instructions for warfarin per cardiology  Moderate risk for surgery  Diagnoses and all orders for this visit:    Pre-op evaluation    Primary osteoarthritis of right knee    Essential hypertension    Chronic atrial fibrillation (HCC)  -     metoprolol tartrate (LOPRESSOR) 50 mg tablet; Take 1 5 tablets (75 mg total) by mouth 2 (two) times a day    Chronic kidney disease (CKD), stage II (mild)    Carrero's esophagus with dysplasia    RLS (restless legs syndrome)    Other orders  -     clindamycin (CLEOCIN) 300 MG capsule; TAKE 2 CAPSULES B Y MOUTH 1 HOUR PRIOR TO DENTAL APPOINTMENT          Subjective:      Patient ID: Evans Osorio is a 80 y o  female  Here today for pre op evaluation   Harshal Oswald is scheduled for right TKR on 3/29 with Dr Tracie Lerma   No issues with anesthesia in the past  She denies any cardiopulmonary complaints   She was in the ER a few weeks ago due to acute episode of difficulty ambulating  Her work up was unremarkable  She has had no issues since  She has been able to exercise with her  twice a week           The following portions of the patient's history were reviewed and updated as appropriate: allergies, current medications, past family history, past medical history, past social history, past surgical history and problem list     Review of Systems   Constitutional: Negative for activity change, appetite change, fatigue and fever  Eyes: Negative for visual disturbance  Respiratory: Negative for cough, chest tightness and shortness of breath  Cardiovascular: Negative for chest pain, palpitations and leg swelling  Gastrointestinal: Negative for abdominal pain, constipation and diarrhea  Genitourinary: Negative for difficulty urinating  Musculoskeletal: Positive for arthralgias  Skin: Negative for rash  Neurological: Negative for dizziness, weakness, light-headedness and headaches  Psychiatric/Behavioral: Negative for sleep disturbance  Objective:      /84   Pulse 93   Temp (!) 97 1 °F (36 2 °C)   Ht 5' 4 5" (1 638 m)   Wt 103 kg (226 lb 3 2 oz)   SpO2 97%   BMI 38 23 kg/m²          Physical Exam  Vitals reviewed  Constitutional:       Appearance: Normal appearance  HENT:      Head: Normocephalic and atraumatic  Eyes:      Conjunctiva/sclera: Conjunctivae normal    Cardiovascular:      Rate and Rhythm: Rhythm irregular  Heart sounds: Normal heart sounds  Pulmonary:      Effort: Pulmonary effort is normal       Breath sounds: Normal breath sounds  Abdominal:      General: Bowel sounds are normal       Palpations: Abdomen is soft  Musculoskeletal:      Right lower leg: No edema  Left lower leg: No edema  Skin:     General: Skin is warm and dry  Neurological:      Mental Status: She is alert and oriented to person, place, and time  Psychiatric:         Mood and Affect: Mood normal          Behavior: Behavior normal        reviewed recent labs with patient

## 2022-03-02 NOTE — ASSESSMENT & PLAN NOTE
Lab Results   Component Value Date    EGFR 63 02/24/2022    EGFR 62 02/15/2022    EGFR 55 10/25/2021    CREATININE 0 87 02/24/2022    CREATININE 0 88 02/15/2022    CREATININE 0 98 10/25/2021   stable

## 2022-03-02 NOTE — ASSESSMENT & PLAN NOTE
Scheduled for TKR on 3/29  Reviewed pre op labs/ekg, stable  Instructions for warfarin per cardiology  Moderate risk for surgery

## 2022-03-14 ENCOUNTER — TELEPHONE (OUTPATIENT)
Dept: GYNECOLOGIC ONCOLOGY | Facility: CLINIC | Age: 82
End: 2022-03-14

## 2022-03-14 NOTE — TELEPHONE ENCOUNTER
Pt states that she has OV with you in June but feels like her Urinary Incontinence is getting worse describes "urine settling in to the left side of her pad and maybe I'm closing up down there" Pt was wondering if she should come in sooner but alsop concerned because she is having TKR in 2 weeks

## 2022-03-15 ENCOUNTER — OFFICE VISIT (OUTPATIENT)
Dept: GYNECOLOGIC ONCOLOGY | Facility: CLINIC | Age: 82
End: 2022-03-15
Payer: MEDICARE

## 2022-03-15 VITALS
RESPIRATION RATE: 17 BRPM | HEIGHT: 65 IN | DIASTOLIC BLOOD PRESSURE: 84 MMHG | HEART RATE: 84 BPM | WEIGHT: 230.5 LBS | OXYGEN SATURATION: 96 % | SYSTOLIC BLOOD PRESSURE: 128 MMHG | BODY MASS INDEX: 38.4 KG/M2 | TEMPERATURE: 98.4 F

## 2022-03-15 DIAGNOSIS — N90.4 LICHEN SCLEROSUS ET ATROPHICUS OF THE VULVA: Primary | ICD-10-CM

## 2022-03-15 PROCEDURE — 99212 OFFICE O/P EST SF 10 MIN: CPT | Performed by: PHYSICIAN ASSISTANT

## 2022-03-15 RX ORDER — CLOBETASOL PROPIONATE 0.5 MG/G
OINTMENT TOPICAL
Qty: 30 G | Refills: 3 | Status: SHIPPED | OUTPATIENT
Start: 2022-03-15

## 2022-03-15 NOTE — PROGRESS NOTES
Assessment/Plan:    Problem List Items Addressed This Visit        Genitourinary    Lichen sclerosus et atrophicus of the vulva - Primary     53-year-old with biopsy-proven lichen sclerosus which is well controlled with clobetasol ointment  She notes a change in her urinary stream without dysuria  Clinical exam is benign, with normal urethra meatus and no anatomical abnormalities  Patient to continue to monitor symptoms  Use clobetasol ointment weekly  Refill provided  Return to the office in 1 year for continued follow-up  Relevant Medications    clobetasol (TEMOVATE) 0 05 % ointment            CHIEF COMPLAINT:   1 year follow-up  "Peeing off to one side"    Problem:  Biopsy-proven lichen sclerosus  Previous therapy:  Clobetasol ointment      Patient ID: Kelsie Caballero is a 80 y o  female  who presents to the office early for annual follow-up, primarily due to change in urinary stream  She notes she is voiding "to one side"  She denies dysuria, frequency or difficulty urinating  Patient denies blood in her urine  She notes her vulvar itching and irritation is well controlled and she continues to use clobetasol weekly  She denies abdominal/pelvic pain  Normal bowel movements  She is current on screening mammograms  The following portions of the patient's history were reviewed and updated as appropriate: allergies, current medications, past medical history, past surgical history and problem list     Review of Systems   Constitutional: Negative  HENT: Negative  Eyes: Negative  Respiratory: Negative  Cardiovascular: Negative  Gastrointestinal: Negative  Genitourinary:        As per HPI  Musculoskeletal: Negative  Skin: Negative  Neurological: Negative  Psychiatric/Behavioral: Negative          Current Outpatient Medications   Medication Sig Dispense Refill    acetaminophen (TYLENOL) 650 mg CR tablet Take 1,300 mg by mouth daily as needed        ascorbic Acid (VITAMIN C) 500 MG CPCR Take 1 capsule (500 mg total) by mouth 2 (two) times a day 60 capsule 0    Calcium Acetate, Phos Binder, (CALCIUM ACETATE PO) Take by mouth daily        cholecalciferol (VITAMIN D3) 1,000 units tablet Take 1 tablet (1,000 Units total) by mouth daily 30 tablet 0    clindamycin (CLEOCIN) 300 MG capsule TAKE 2 CAPSULES B Y MOUTH 1 HOUR PRIOR TO DENTAL APPOINTMENT      clobetasol (TEMOVATE) 0 05 % ointment Apply once weekly to the affected area 30 g 3    famotidine (PEPCID) 40 MG tablet TAKE 1 TABLET BY MOUTH EVERY DAY 90 tablet 1    ferrous sulfate 324 (65 Fe) mg Take 1 tablet (324 mg total) by mouth daily before breakfast 30 tablet 0    fluticasone (FLONASE) 50 mcg/act nasal spray SPRAY 1 SPRAY INTO EACH NOSTRIL EVERY DAY 16 mL 1    folic acid (FOLVITE) 1 mg tablet Take 1 tablet (1 mg total) by mouth daily 30 tablet 0    furosemide (LASIX) 40 mg tablet TAKE 1 TABLET BY MOUTH EVERY DAY 90 tablet 3    gabapentin (NEURONTIN) 800 mg tablet Take 1 tablet (800 mg total) by mouth 4 (four) times a day 360 tablet 1    lisinopril (ZESTRIL) 2 5 mg tablet TAKE 1 TABLET BY MOUTH EVERY DAY 90 tablet 0    loratadine (CLARITIN) 10 mg tablet Take 10 mg by mouth daily      magnesium 30 MG tablet Take 30 mg by mouth 2 (two) times a day      metoprolol tartrate (LOPRESSOR) 50 mg tablet Take 1 5 tablets (75 mg total) by mouth 2 (two) times a day 180 tablet     multivitamin (THERAGRAN) TABS Take 1 tablet by mouth daily      mupirocin (BACTROBAN) 2 % ointment        pramipexole (MIRAPEX) 0 5 mg tablet 2 FULL TABLETS TWICE A DAY AT 5:00 P  M   AND 10:00 P M  360 tablet 1    sodium chloride (OCEAN) 0 65 % nasal spray 1 spray into each nostril 2 (two) times a day as needed for rhinitis 60 mL 1    warfarin (Coumadin) 5 mg tablet TAKE 2 TABS BY MOUTH DAILY OR AS DIRECTED BY PHYSICIAN 90 tablet 2    warfarin (COUMADIN) 7 5 mg tablet TAKE 1 TABLET BY MOUTH EVERY DAY 90 tablet 3    zinc sulfate (ZINCATE) 220 mg capsule Take 1 capsule (220 mg total) by mouth daily 30 capsule 0    Flovent  MCG/ACT inhaler INHALE 2 PUFFS 2 (TWO) TIMES A DAY RINSE MOUTH AFTER USE  (Patient not taking: Reported on 3/2/2022 ) 12 g 1     No current facility-administered medications for this visit  Objective:    Blood pressure 128/84, pulse 84, temperature 98 4 °F (36 9 °C), temperature source Temporal, resp  rate 17, height 5' 4 5" (1 638 m), weight 105 kg (230 lb 8 oz), SpO2 96 %  Body mass index is 38 95 kg/m²  Body surface area is 2 09 meters squared  Physical Exam  Vitals reviewed  Exam conducted with a chaperone present  Constitutional:       General: She is not in acute distress  Appearance: Normal appearance  She is not ill-appearing  HENT:      Head: Normocephalic and atraumatic  Mouth/Throat:      Mouth: Mucous membranes are moist    Eyes:      General:         Right eye: No discharge  Left eye: No discharge  Conjunctiva/sclera: Conjunctivae normal    Pulmonary:      Effort: Pulmonary effort is normal    Abdominal:      Palpations: Abdomen is soft  There is no mass  Tenderness: There is no abdominal tenderness  Hernia: No hernia is present  Genitourinary:     Comments: Loss of labia minora bilaterally  The bartholin's, uretheral and skenes glands are normal  The urethral meatus is normal (midline with no lesions)  Anus without fissure or lesion  Speculum exam reveals a grossly normal vagina  No masses, lesions,discharge or bleeding  No significant cystocele or rectocele noted  Bimanual exam notes a surgical absent cervix, uterus and adnexal structures  No masses or fullness  Bladder is without fullness, mass or tenderness  Musculoskeletal:      Right lower leg: No edema  Left lower leg: No edema  Skin:     General: Skin is warm and dry  Coloration: Skin is not jaundiced  Findings: No rash  Neurological:      General: No focal deficit present  Mental Status: She is alert and oriented to person, place, and time  Cranial Nerves: No cranial nerve deficit  Sensory: No sensory deficit  Motor: No weakness  Gait: Gait normal    Psychiatric:         Mood and Affect: Mood normal          Behavior: Behavior normal          Thought Content:  Thought content normal          Judgment: Judgment normal

## 2022-03-15 NOTE — ASSESSMENT & PLAN NOTE
81-year-old with biopsy-proven lichen sclerosus which is well controlled with clobetasol ointment  She notes a change in her urinary stream without dysuria  Clinical exam is benign, with normal urethra meatus and no anatomical abnormalities  Patient to continue to monitor symptoms  Use clobetasol ointment weekly  Refill provided  Return to the office in 1 year for continued follow-up

## 2022-03-18 ENCOUNTER — TELEPHONE (OUTPATIENT)
Dept: OBGYN CLINIC | Facility: CLINIC | Age: 82
End: 2022-03-18

## 2022-03-18 NOTE — TELEPHONE ENCOUNTER
I sent the zinc to [facility] pharmacy [place] on March 1st   I do not believe that there was a conflict with other medications

## 2022-03-18 NOTE — TELEPHONE ENCOUNTER
Rosa Miller was wondering if she was suppose to be taking the iron vitamin  She never got it from the pharmacy      Not sure if there was a conflict with other medication

## 2022-03-21 ENCOUNTER — TELEPHONE (OUTPATIENT)
Dept: OBGYN CLINIC | Facility: CLINIC | Age: 82
End: 2022-03-21

## 2022-03-21 ENCOUNTER — EVALUATION (OUTPATIENT)
Dept: PHYSICAL THERAPY | Facility: CLINIC | Age: 82
End: 2022-03-21
Payer: MEDICARE

## 2022-03-21 DIAGNOSIS — M25.561 CHRONIC PAIN OF RIGHT KNEE: ICD-10-CM

## 2022-03-21 DIAGNOSIS — G89.29 CHRONIC PAIN OF RIGHT KNEE: ICD-10-CM

## 2022-03-21 DIAGNOSIS — I48.20 CHRONIC ATRIAL FIBRILLATION (HCC): ICD-10-CM

## 2022-03-21 DIAGNOSIS — Z01.818 PRE-OP EXAM: ICD-10-CM

## 2022-03-21 DIAGNOSIS — M17.11 PRIMARY OSTEOARTHRITIS OF RIGHT KNEE: Primary | ICD-10-CM

## 2022-03-21 PROCEDURE — 97161 PT EVAL LOW COMPLEX 20 MIN: CPT

## 2022-03-21 RX ORDER — METOPROLOL TARTRATE 50 MG/1
75 TABLET, FILM COATED ORAL 2 TIMES DAILY
Qty: 180 TABLET | Refills: 3 | Status: SHIPPED | OUTPATIENT
Start: 2022-03-21

## 2022-03-21 NOTE — PRE-PROCEDURE INSTRUCTIONS
My Surgical Experience    The following information was developed to assist you to prepare for your operation  What do I need to do before coming to the hospital?   Arrange for a responsible person to drive you to and from the hospital    Arrange care for your children at home  Children are not allowed in the recovery areas of the hospital   Plan to wear clothing that is easy to put on and take off  If you are having shoulder surgery, wear a shirt that buttons or zippers in the front  Bathing  o Shower the evening before and the morning of your surgery with an antibacterial soap  Please refer to the Pre Op Showering Instructions for Surgery Patients Sheet   o Remove nail polish and all body piercing jewelry  o Do not shave any body part for at least 24 hours before surgery-this includes face, arms, legs and upper body  Food  o Nothing to eat or drink after midnight the night before your surgery  This includes candy and chewing gum  o Exception: If your surgery is after 12:00pm (noon), you may have clear liquids such as 7-Up®, ginger ale, apple or cranberry juice, Jell-O®, water, or clear broth until 8:00 am  o Do not drink milk or juice with pulp on the morning before surgery  o Do not drink alcohol 24 hours before surgery  Medicine  o Follow instructions you received from your surgeon about which medicines you may take on the day of surgery  o If instructed to take medicine on the morning of surgery, take pills with just a small sip of water  Call your prescribing doctor for specific infroamtion on what to do if you take insulin    What should I bring to the hospital?    Bring:  Paige Barthel or a walker, if you have them, for foot or knee surgery   A list of the daily medicines, vitamins, minerals, herbals and nutritional supplements you take   Include the dosages of medicines and the time you take them each day   Glasses, dentures or hearing aids   Minimal clothing; you will be wearing hospital sleepwear   Photo ID; required to verify your identity   If you have a Living Will or Power of , bring a copy of the documents   If you have an ostomy, bring an extra pouch and any supplies you use    Do not bring   Medicines or inhalers   Money, valuables or jewelry    What other information should I know about the day of surgery?  Notify your surgeons if you develop a cold, sore throat, cough, fever, rash or any other illness   Report to the Ambulatory Surgical/Same Day Surgery Unit   You will be instructed to stop at Registration only if you have not been pre-registered   Inform your  fi they do not stay that they will be asked by the staff to leave a phone number where they can be reached   Be available to be reached before surgery  In the event the operating room schedule changes, you may be asked to come in earlier or later than expected    *It is important to tell your doctor and others involved in your health care if you are taking or have been taking any non-prescription drugs, vitamins, minerals, herbals or other nutritional supplements  Any of these may interact with some food or medicines and cause a reaction      Pre-Surgery Instructions:   Medication Instructions    acetaminophen (TYLENOL) 650 mg CR tablet Instructed patient per Anesthesia Guidelines   ascorbic Acid (VITAMIN C) 500 MG CPCR Instructed patient per Anesthesia Guidelines   Calcium Acetate, Phos Binder, (CALCIUM ACETATE PO) Instructed patient per Anesthesia Guidelines   cholecalciferol (VITAMIN D3) 1,000 units tablet Instructed patient per Anesthesia Guidelines   clindamycin (CLEOCIN) 300 MG capsule Instructed patient per Anesthesia Guidelines   clobetasol (TEMOVATE) 0 05 % ointment Instructed patient per Anesthesia Guidelines   famotidine (PEPCID) 40 MG tablet Instructed patient per Anesthesia Guidelines   ferrous sulfate 324 (65 Fe) mg Instructed patient per Anesthesia Guidelines      fluticasone (FLONASE) 50 mcg/act nasal spray Instructed patient per Anesthesia Guidelines   folic acid (FOLVITE) 1 mg tablet Instructed patient per Anesthesia Guidelines   furosemide (LASIX) 40 mg tablet Instructed patient per Anesthesia Guidelines   gabapentin (NEURONTIN) 800 mg tablet Instructed patient per Anesthesia Guidelines   lisinopril (ZESTRIL) 2 5 mg tablet Instructed patient per Anesthesia Guidelines   loratadine (CLARITIN) 10 mg tablet Instructed patient per Anesthesia Guidelines   magnesium 30 MG tablet Instructed patient per Anesthesia Guidelines   metoprolol tartrate (LOPRESSOR) 50 mg tablet Instructed patient per Anesthesia Guidelines   multivitamin (THERAGRAN) TABS Instructed patient per Anesthesia Guidelines   pramipexole (MIRAPEX) 0 5 mg tablet Instructed patient per Anesthesia Guidelines   sodium chloride (OCEAN) 0 65 % nasal spray Instructed patient per Anesthesia Guidelines   warfarin (COUMADIN) 7 5 mg tablet Instructed patient per Anesthesia Guidelines   zinc sulfate (ZINCATE) 220 mg capsule Instructed patient per Anesthesia Guidelines  To take gabapentin, metoprolol, nasal spray prn and inhaler-prn a m  of surgery  Pt to check with surgeon re- when to hold warfarin

## 2022-03-21 NOTE — TELEPHONE ENCOUNTER
2453: [facility] confirms they have RX for ferrous sulfate however it was not picked up  NN requested she process the RX for patient to  today, she reports pt will get a text when ready  PAT will advise today when pt to stop pre-op meds at scheduled appt  Pt understands above plan, she understands ferrous sulfate will be ready for  today and she will start taking as prescribed until PAT advises to stop  Pt encouraged to call with any questions, concerns or issues  200:  Called pt, she reports she did not get iron RX  NN requested pt get bottles to review what she is taking, she confirms she is taking zinc sulfate, vit C, folic acid 1mg  She does not have any ferrous sulfate  NN to call pharmacy to explore

## 2022-03-21 NOTE — PROGRESS NOTES
PT Evaluation   Today's date: 3/21/2022  Patient name: Nikia Soriano  : 1940  MRN: [id number]  Referring provider: Rosina Reynolds DO  Dx:   Encounter Diagnosis     ICD-10-CM    1  Primary osteoarthritis of right knee  M17 11 Ambulatory referral to Physical Therapy   2  Chronic pain of right knee  M25 561 Ambulatory referral to Physical Therapy    G89 29    3  Pre-op exam  Z01 [id number] Ambulatory referral to Physical Therapy         Assessment  Assessment details: Patient is a 80 y o  Female who presents to skilled outpatient PT for pre-operative evaluation prior to R TKA scheduled for 3/28/22  Brian Evangelista Referring diagnoses include R knee pain  Today, pt presents w/ decreased and painful R knee ROM and joint mobility, decreased B LE and core strength, high self reports of pain, and decreased tolerance to activity  Functionally, she is limited in her ability to stand and ambulate, negotiate stairs, perform age appropriate recreation and exercise, sleep through the night, and perform normal household activities  She is motivated to improve and has benefited from skilled PT in the past      Patient education performed during today's session included: Signs and symptoms of infection (including redness, warmth, drainage, swelling), Importance of keeping incision dry, per MD order, Home safety checklist, which was signed by both parties and uploaded into patient's chart and Topics of stair negotiation, ambulation with use of appropriate assistive device, and car transfers also reviewed        Impairments: Abnormal gait, Abnormal or restricted ROM, Activity intolerance, Impaired balance, Impaired physical strength, Lacks appropriate HEP, Poor body mechanics and Pain with function  Understanding of Dx/Px/POC: Good  Prognosis: Excellent    Patient verbalized understanding of POC  Please contact me if you have any questions or recommendations   Thank you for the referral and the opportunity to share in Keily DESHAWN Ortiz's care  Plan  Plan details: 2-3x/week for 8-12 weeks pending progress     Patient would benefit from: PT Eval and Skilled PT  Planned modality interventions: Biofeedback and Cryotherapy  Planned therapy interventions: Abdominal trunk stabilization, ADL training, Balance, Balance/WB training, Body mechanics training, Functional ROM exercises, Gait training, Patient education, Postural training and Strengthening  Frequency: 2x/wk, 3x/wk and 4x/wk  Duration in weeks: 12  Plan of Care beginning date: 3/28/22  Plan of Care expiration date: 1 month - 2022  Treatment plan discussed with: Patient       Goals  Short Term Goals (4 weeks):    - Patient will be independent in basic HEP 2-3 weeks  - Patient will demonstrate >100 degrees of knee ROM for reciprocal stair negotiation  - Patient will have 0/10 pain at rest  - Patient will demonstrate >1/3 improvement in MMT grade as applicable    Long Term Goals (8 weeks):  - Patient will be independent in a comprehensive home exercise program  - Patient FOTO score will improve to at least 10 points higher than post op score    - Patient will ambulation with AD PRN  - Patient will independently ambulate >1000 feet (community ambulation)  - Patient will self-report >75% improvement in function    Subjective  Pain  - Current pain ratin/10  - At best pain ratin/10  - At worst pain ratin/10  - Location: R knee   - Aggravating factors: stairs, ambulation, functional transfers, prolonged WB     Social Support  - Steps to enter house: 2-3  - Stairs in house: 13  - Bedroom/bathroom set up: normal   - DME available: walker   - Lives in: 2 story home - can stay on first floor   - Lives with: spouse       Objective     LE MMT  R knee flex/ext grossly 4-/5  L knee flex/ext grossly 4/5    B hips grossly 3+-4-/5     Core no greater than 1+/5     LE ROM    - L Knee Flexion: 112 degrees (120 passive) R Knee Flexion: 108 degrees (112 passive)  - L Knee Extension: 0 degrees  R Knee Extension: 0 degrees    Sensation  - Light touch: intact     Skin Check  - (-) redness, warmth, drainage    Knee Comments  - Gait Assessment: decreased step length and WB through R LE, poor lumbar rotation, fatigues quickly   - Stair Negotiation: TBA post op - pt educated in stairs for home           Eval/ Re-eval Auth #/ Referral # Total visits Start date  Expiration date Total active units  Total manual units  PT only or  PT+OT?                                                             Date: 3/21           Total authorized units:  Active:            Manual:           Total remaining units:  Active:               Manual:                Date:           Total authorized units: Active:            Manual:           Total remaining units:  Active:               Manual:                        Precautions: B diabetic neuropathy, DM II, L TKA August 2020    Past Medical History:   Diagnosis Date    [facility] fibrillation ([facility] )     Carrero's esophagus     last assessed: 1/23/2018    BRCA1 negative     BRCA2 negative     [facility] 2006    stage 1 (left), given adjuvant radiation with Arimidex x 5 years    Cancer [facility]) 2006    Left Breast, Lumpectomy    Cataract     last assessed: 3/11/2014    Dysplasia of toenail     last assessed: 8/29/2017    Esophageal reflux     GERD (gastroesophageal reflux disease)     Gross hematuria     last assessed: 2/19/2015    Hematuria     Hiatal hernia     History of radiation therapy     Hypertension     Irregular heart beat     AFIB    Mixed sensory-motor polyneuropathy     Neuropathy     Obesity     Pacemaker     Paroxysmal atrial fibrillation (HCC)     Peripheral neuropathy     Rectal bleeding     Restless leg syndrome     Shortness of breath     last assessed: 1/11/2016         Date 3/21/2022         Visit Number IE        ROM         Knee Flexion Tested         Knee Extension tested                 Manual         Patellar mobs         STM                           TherEx         Knee/Hip PROM x2-3 mins         Active w/u         Quad set, glute set x10-15         Ankle pumps rev        SLR x20        Hip abduction         Knee ext: SAQ, LAQ         Heel slides                                                               TherAct         Patient education x5-6 mins for POC and HEP rev         Stair negotiation         Car transfer                           Gait Training                                    Modalities         CP

## 2022-03-21 NOTE — TELEPHONE ENCOUNTER
Patient needs pre op warfarin  hold instructions  Surgery is 3/28/22  Patient cleared by PCP  Waiting on cardiac clearance  Please advise

## 2022-03-22 NOTE — TELEPHONE ENCOUNTER
Dr Levon Weber clearance letter was written before ED visit , metoprolol was increased to 75mg bid for some uncontrolled AF on device on 2/28/22 please advise

## 2022-03-23 DIAGNOSIS — I48.20 CHRONIC ATRIAL FIBRILLATION (HCC): ICD-10-CM

## 2022-03-23 RX ORDER — WARFARIN SODIUM 5 MG/1
TABLET ORAL
Qty: 180 TABLET | Refills: 1 | Status: ON HOLD | OUTPATIENT
Start: 2022-03-23 | End: 2022-03-28

## 2022-03-24 DIAGNOSIS — G89.29 CHRONIC PAIN OF RIGHT KNEE: ICD-10-CM

## 2022-03-24 DIAGNOSIS — M17.11 PRIMARY OSTEOARTHRITIS OF RIGHT KNEE: ICD-10-CM

## 2022-03-24 DIAGNOSIS — M25.561 CHRONIC PAIN OF RIGHT KNEE: ICD-10-CM

## 2022-03-24 RX ORDER — FOLIC ACID 1 MG/1
TABLET ORAL
Qty: 30 TABLET | Refills: 0 | Status: SHIPPED | OUTPATIENT
Start: 2022-03-24 | End: 2022-03-25

## 2022-03-24 RX ORDER — LANOLIN ALCOHOL/MO/W.PET/CERES
CREAM (GRAM) TOPICAL
Qty: 60 CAPSULE | Refills: 0 | Status: SHIPPED | OUTPATIENT
Start: 2022-03-24 | End: 2022-03-25

## 2022-03-25 DIAGNOSIS — M25.561 CHRONIC PAIN OF RIGHT KNEE: ICD-10-CM

## 2022-03-25 DIAGNOSIS — G89.29 CHRONIC PAIN OF RIGHT KNEE: ICD-10-CM

## 2022-03-25 DIAGNOSIS — M17.11 PRIMARY OSTEOARTHRITIS OF RIGHT KNEE: ICD-10-CM

## 2022-03-25 RX ORDER — FOLIC ACID 1 MG/1
TABLET ORAL
Qty: 90 TABLET | Refills: 1 | Status: SHIPPED | OUTPATIENT
Start: 2022-03-25 | End: 2022-03-29 | Stop reason: HOSPADM

## 2022-03-25 RX ORDER — LANOLIN ALCOHOL/MO/W.PET/CERES
CREAM (GRAM) TOPICAL
Qty: 180 CAPSULE | Refills: 1 | Status: SHIPPED | OUTPATIENT
Start: 2022-03-25 | End: 2022-03-29 | Stop reason: HOSPADM

## 2022-03-27 ENCOUNTER — ANESTHESIA EVENT (OUTPATIENT)
Dept: PERIOP | Facility: HOSPITAL | Age: 82
End: 2022-03-27
Payer: MEDICARE

## 2022-03-27 NOTE — ANESTHESIA PREPROCEDURE EVALUATION
Procedure:  ARTHROPLASTY KNEE TOTAL W ROBOT - RIGHT (Right Knee)    Relevant Problems   CARDIO   (+) Atrial fibrillation (HCC) [I48 91]   (+) Essential hypertension      GI/HEPATIC   (+) GERD (gastroesophageal reflux disease)   (+) Hiatal hernia      /RENAL   (+) Chronic kidney disease (CKD), stage II (mild)      MUSCULOSKELETAL   (+) Arthritis   (+) Primary osteoarthritis of both knees   (+) Primary osteoarthritis of right knee      NEURO/PSYCH   (+) Diabetic polyneuropathy associated with type 2 diabetes mellitus (HCC)        Physical Exam    Airway    Mallampati score: II  TM Distance: >3 FB  Neck ROM: full     Dental       Cardiovascular  Rhythm: irregular, Rate: abnormal,     Pulmonary  Breath sounds clear to auscultation, Decreased breath sounds,     Other Findings        Anesthesia Plan  ASA Score- 3     Anesthesia Type- spinal with ASA Monitors  Additional Monitors:   Airway Plan:           Plan Factors-Exercise tolerance (METS): <4 METS  Chart reviewed  EKG reviewed  Existing labs reviewed  Patient summary reviewed  Patient is not a current smoker  Induction- intravenous  Postoperative Plan- Plan for postoperative opioid use  Informed Consent- Anesthetic plan and risks discussed with patient  I personally reviewed this patient with the CRNA  Discussed and agreed on the Anesthesia Plan with the CRNA  Tha Mcgill

## 2022-03-28 ENCOUNTER — HOSPITAL ENCOUNTER (OUTPATIENT)
Facility: HOSPITAL | Age: 82
Setting detail: OUTPATIENT SURGERY
Discharge: HOME | End: 2022-03-29
Attending: ORTHOPAEDIC SURGERY | Admitting: ORTHOPAEDIC SURGERY
Payer: MEDICARE

## 2022-03-28 ENCOUNTER — APPOINTMENT (OUTPATIENT)
Dept: RADIOLOGY | Facility: HOSPITAL | Age: 82
End: 2022-03-28
Payer: MEDICARE

## 2022-03-28 ENCOUNTER — ANESTHESIA (OUTPATIENT)
Dept: PERIOP | Facility: HOSPITAL | Age: 82
End: 2022-03-28
Payer: MEDICARE

## 2022-03-28 DIAGNOSIS — Z96.651 STATUS POST TOTAL KNEE REPLACEMENT USING CEMENT, RIGHT: Primary | ICD-10-CM

## 2022-03-28 LAB
INR PPP: 1.58 (ref 0.84–1.19)
PROTHROMBIN TIME: 18.5 SECONDS (ref 11.6–14.5)

## 2022-03-28 PROCEDURE — 27447 TOTAL KNEE ARTHROPLASTY: CPT | Performed by: ORTHOPAEDIC SURGERY

## 2022-03-28 PROCEDURE — 97163 PT EVAL HIGH COMPLEX 45 MIN: CPT

## 2022-03-28 PROCEDURE — 73560 X-RAY EXAM OF KNEE 1 OR 2: CPT

## 2022-03-28 PROCEDURE — 97110 THERAPEUTIC EXERCISES: CPT

## 2022-03-28 PROCEDURE — G9196 MED REASON FOR NO CEPH: HCPCS | Performed by: ORTHOPAEDIC SURGERY

## 2022-03-28 PROCEDURE — C9290 INJ, BUPIVACAINE LIPOSOME: HCPCS | Performed by: ANESTHESIOLOGY

## 2022-03-28 PROCEDURE — 27447 TOTAL KNEE ARTHROPLASTY: CPT | Performed by: PHYSICIAN ASSISTANT

## 2022-03-28 PROCEDURE — 99024 POSTOP FOLLOW-UP VISIT: CPT | Performed by: ORTHOPAEDIC SURGERY

## 2022-03-28 PROCEDURE — C1776 JOINT DEVICE (IMPLANTABLE): HCPCS | Performed by: ORTHOPAEDIC SURGERY

## 2022-03-28 PROCEDURE — S2900 ROBOTIC SURGICAL SYSTEM: HCPCS | Performed by: ORTHOPAEDIC SURGERY

## 2022-03-28 PROCEDURE — 85610 PROTHROMBIN TIME: CPT | Performed by: PHYSICIAN ASSISTANT

## 2022-03-28 PROCEDURE — C1713 ANCHOR/SCREW BN/BN,TIS/BN: HCPCS | Performed by: ORTHOPAEDIC SURGERY

## 2022-03-28 DEVICE — ATTUNE KNEE SYSTEM TIBIAL BASE FIXED BEARING SIZE 5 CEMENTED
Type: IMPLANTABLE DEVICE | Site: KNEE | Status: FUNCTIONAL
Brand: ATTUNE

## 2022-03-28 DEVICE — PALACOS® R IS A FAST-CURING, RADIOPAQUE, POLY(METHYL METHACRYLATE)-BASED BONE CEMENT.PALACOS ® R CONTAINS THE X-RAY CONTRAST MEDIUM ZIRCONIUM DIOXIDE. TO IMPROVE VISIBILITY IN THE SURGICAL FIELD PALACOS ® R HAS BEEN COLOURED WITH CHLOROPHYLL (E141). THE BONE CEMENT IS PREPARED DIRECTLY BEFORE USE BY MIXING A POLYMER POWDER COMPONENT WITH A LIQUID MONOMER COMPONENT. A DUCTILE DOUGH FORMS WHICH CURES WITHIN A FEW MINUTES.
Type: IMPLANTABLE DEVICE | Site: KNEE | Status: FUNCTIONAL
Brand: PALACOS®

## 2022-03-28 DEVICE — ATTUNE KNEE SYSTEM TIBIAL INSERT FIXED BEARING CRUCIATE RETAINING 5 5MM AOX
Type: IMPLANTABLE DEVICE | Site: KNEE | Status: FUNCTIONAL
Brand: ATTUNE

## 2022-03-28 DEVICE — ATTUNE PATELLA MEDIALIZED DOME 32MM CEMENTED AOX
Type: IMPLANTABLE DEVICE | Site: KNEE | Status: FUNCTIONAL
Brand: ATTUNE

## 2022-03-28 DEVICE — ATTUNE KNEE SYSTEM FEMORAL CRUCIATE RETAINING NARROW SIZE 5N RIGHT CEMENTED
Type: IMPLANTABLE DEVICE | Site: KNEE | Status: FUNCTIONAL
Brand: ATTUNE

## 2022-03-28 RX ORDER — ONDANSETRON 2 MG/ML
INJECTION INTRAMUSCULAR; INTRAVENOUS AS NEEDED
Status: DISCONTINUED | OUTPATIENT
Start: 2022-03-28 | End: 2022-03-28

## 2022-03-28 RX ORDER — ONDANSETRON 2 MG/ML
4 INJECTION INTRAMUSCULAR; INTRAVENOUS EVERY 6 HOURS PRN
Status: DISCONTINUED | OUTPATIENT
Start: 2022-03-28 | End: 2022-03-29 | Stop reason: HOSPADM

## 2022-03-28 RX ORDER — FLUTICASONE PROPIONATE 50 MCG
1 SPRAY, SUSPENSION (ML) NASAL DAILY
Status: DISCONTINUED | OUTPATIENT
Start: 2022-03-28 | End: 2022-03-29 | Stop reason: HOSPADM

## 2022-03-28 RX ORDER — FENTANYL CITRATE/PF 50 MCG/ML
25 SYRINGE (ML) INJECTION
Status: DISCONTINUED | OUTPATIENT
Start: 2022-03-28 | End: 2022-03-28 | Stop reason: HOSPADM

## 2022-03-28 RX ORDER — PROPOFOL 10 MG/ML
INJECTION, EMULSION INTRAVENOUS CONTINUOUS PRN
Status: DISCONTINUED | OUTPATIENT
Start: 2022-03-28 | End: 2022-03-28

## 2022-03-28 RX ORDER — BUPIVACAINE HYDROCHLORIDE 2.5 MG/ML
INJECTION, SOLUTION EPIDURAL; INFILTRATION; INTRACAUDAL AS NEEDED
Status: DISCONTINUED | OUTPATIENT
Start: 2022-03-28 | End: 2022-03-28 | Stop reason: HOSPADM

## 2022-03-28 RX ORDER — PROPOFOL 10 MG/ML
INJECTION, EMULSION INTRAVENOUS AS NEEDED
Status: DISCONTINUED | OUTPATIENT
Start: 2022-03-28 | End: 2022-03-28

## 2022-03-28 RX ORDER — ACETAMINOPHEN 325 MG/1
975 TABLET ORAL ONCE
Status: COMPLETED | OUTPATIENT
Start: 2022-03-28 | End: 2022-03-28

## 2022-03-28 RX ORDER — ACETAMINOPHEN 325 MG/1
975 TABLET ORAL EVERY 8 HOURS
Status: DISCONTINUED | OUTPATIENT
Start: 2022-03-28 | End: 2022-03-29 | Stop reason: HOSPADM

## 2022-03-28 RX ORDER — MAGNESIUM HYDROXIDE/ALUMINUM HYDROXICE/SIMETHICONE 120; 1200; 1200 MG/30ML; MG/30ML; MG/30ML
30 SUSPENSION ORAL EVERY 6 HOURS PRN
Status: DISCONTINUED | OUTPATIENT
Start: 2022-03-28 | End: 2022-03-29 | Stop reason: HOSPADM

## 2022-03-28 RX ORDER — BUPIVACAINE HYDROCHLORIDE 5 MG/ML
INJECTION, SOLUTION PERINEURAL
Status: DISCONTINUED | OUTPATIENT
Start: 2022-03-28 | End: 2022-03-28

## 2022-03-28 RX ORDER — OXYCODONE HYDROCHLORIDE 5 MG/1
5 TABLET ORAL EVERY 4 HOURS PRN
Status: DISCONTINUED | OUTPATIENT
Start: 2022-03-28 | End: 2022-03-29 | Stop reason: HOSPADM

## 2022-03-28 RX ORDER — GABAPENTIN 300 MG/1
300 CAPSULE ORAL ONCE
Status: DISCONTINUED | OUTPATIENT
Start: 2022-03-28 | End: 2022-03-28

## 2022-03-28 RX ORDER — CHLORHEXIDINE GLUCONATE 0.12 MG/ML
15 RINSE ORAL ONCE
Status: COMPLETED | OUTPATIENT
Start: 2022-03-28 | End: 2022-03-28

## 2022-03-28 RX ORDER — FUROSEMIDE 40 MG/1
40 TABLET ORAL DAILY
Status: DISCONTINUED | OUTPATIENT
Start: 2022-03-28 | End: 2022-03-29 | Stop reason: HOSPADM

## 2022-03-28 RX ORDER — SODIUM CHLORIDE 9 MG/ML
75 INJECTION, SOLUTION INTRAVENOUS CONTINUOUS
Status: DISCONTINUED | OUTPATIENT
Start: 2022-03-28 | End: 2022-03-29 | Stop reason: ALTCHOICE

## 2022-03-28 RX ORDER — FENTANYL CITRATE 50 UG/ML
INJECTION, SOLUTION INTRAMUSCULAR; INTRAVENOUS AS NEEDED
Status: DISCONTINUED | OUTPATIENT
Start: 2022-03-28 | End: 2022-03-28

## 2022-03-28 RX ORDER — BUPIVACAINE HYDROCHLORIDE 7.5 MG/ML
INJECTION, SOLUTION INTRASPINAL AS NEEDED
Status: DISCONTINUED | OUTPATIENT
Start: 2022-03-28 | End: 2022-03-28

## 2022-03-28 RX ORDER — ONDANSETRON 2 MG/ML
4 INJECTION INTRAMUSCULAR; INTRAVENOUS ONCE AS NEEDED
Status: DISCONTINUED | OUTPATIENT
Start: 2022-03-28 | End: 2022-03-28 | Stop reason: HOSPADM

## 2022-03-28 RX ORDER — PANTOPRAZOLE SODIUM 40 MG/1
40 TABLET, DELAYED RELEASE ORAL DAILY
Status: DISCONTINUED | OUTPATIENT
Start: 2022-03-28 | End: 2022-03-29 | Stop reason: HOSPADM

## 2022-03-28 RX ORDER — DOCUSATE SODIUM 100 MG/1
100 CAPSULE, LIQUID FILLED ORAL 2 TIMES DAILY
Status: DISCONTINUED | OUTPATIENT
Start: 2022-03-28 | End: 2022-03-29 | Stop reason: HOSPADM

## 2022-03-28 RX ORDER — DEXAMETHASONE SODIUM PHOSPHATE 10 MG/ML
INJECTION, SOLUTION INTRAMUSCULAR; INTRAVENOUS AS NEEDED
Status: DISCONTINUED | OUTPATIENT
Start: 2022-03-28 | End: 2022-03-28

## 2022-03-28 RX ORDER — OXYCODONE HYDROCHLORIDE 5 MG/1
2.5 TABLET ORAL EVERY 4 HOURS PRN
Status: DISCONTINUED | OUTPATIENT
Start: 2022-03-28 | End: 2022-03-29 | Stop reason: HOSPADM

## 2022-03-28 RX ORDER — DEXAMETHASONE SODIUM PHOSPHATE 10 MG/ML
10 INJECTION, SOLUTION INTRAMUSCULAR; INTRAVENOUS ONCE
Status: COMPLETED | OUTPATIENT
Start: 2022-03-29 | End: 2022-03-29

## 2022-03-28 RX ORDER — LORATADINE 10 MG/1
10 TABLET ORAL DAILY
Status: DISCONTINUED | OUTPATIENT
Start: 2022-03-28 | End: 2022-03-29 | Stop reason: HOSPADM

## 2022-03-28 RX ORDER — GABAPENTIN 400 MG/1
800 CAPSULE ORAL 4 TIMES DAILY
Status: DISCONTINUED | OUTPATIENT
Start: 2022-03-28 | End: 2022-03-29 | Stop reason: HOSPADM

## 2022-03-28 RX ORDER — HYDROMORPHONE HCL/PF 1 MG/ML
0.2 SYRINGE (ML) INJECTION
Status: DISCONTINUED | OUTPATIENT
Start: 2022-03-28 | End: 2022-03-28 | Stop reason: HOSPADM

## 2022-03-28 RX ORDER — MAGNESIUM HYDROXIDE 1200 MG/15ML
LIQUID ORAL AS NEEDED
Status: DISCONTINUED | OUTPATIENT
Start: 2022-03-28 | End: 2022-03-28 | Stop reason: HOSPADM

## 2022-03-28 RX ORDER — OXYCODONE HCL 10 MG/1
10 TABLET, FILM COATED, EXTENDED RELEASE ORAL ONCE
Status: COMPLETED | OUTPATIENT
Start: 2022-03-28 | End: 2022-03-28

## 2022-03-28 RX ORDER — SODIUM CHLORIDE, SODIUM LACTATE, POTASSIUM CHLORIDE, CALCIUM CHLORIDE 600; 310; 30; 20 MG/100ML; MG/100ML; MG/100ML; MG/100ML
125 INJECTION, SOLUTION INTRAVENOUS CONTINUOUS
Status: DISCONTINUED | OUTPATIENT
Start: 2022-03-28 | End: 2022-03-28

## 2022-03-28 RX ADMIN — CHLORHEXIDINE GLUCONATE 15 ML: 1.2 SOLUTION ORAL at 06:33

## 2022-03-28 RX ADMIN — PANTOPRAZOLE SODIUM 40 MG: 40 TABLET, DELAYED RELEASE ORAL at 11:27

## 2022-03-28 RX ADMIN — PROPOFOL 60 MG: 10 INJECTION, EMULSION INTRAVENOUS at 07:36

## 2022-03-28 RX ADMIN — Medication 1500 MG: at 07:16

## 2022-03-28 RX ADMIN — FUROSEMIDE 40 MG: 40 TABLET ORAL at 11:27

## 2022-03-28 RX ADMIN — SODIUM CHLORIDE, SODIUM LACTATE, POTASSIUM CHLORIDE, AND CALCIUM CHLORIDE: .6; .31; .03; .02 INJECTION, SOLUTION INTRAVENOUS at 09:04

## 2022-03-28 RX ADMIN — BUPIVACAINE HYDROCHLORIDE 10 ML: 5 INJECTION, SOLUTION PERINEURAL at 11:50

## 2022-03-28 RX ADMIN — VANCOMYCIN HYDROCHLORIDE 1500 MG: 10 INJECTION, POWDER, LYOPHILIZED, FOR SOLUTION INTRAVENOUS at 19:57

## 2022-03-28 RX ADMIN — GABAPENTIN 800 MG: 400 CAPSULE ORAL at 21:32

## 2022-03-28 RX ADMIN — SODIUM CHLORIDE 75 ML/HR: 0.9 INJECTION, SOLUTION INTRAVENOUS at 11:39

## 2022-03-28 RX ADMIN — TRANEXAMIC ACID 50 ML: 1 INJECTION, SOLUTION INTRAVENOUS at 07:52

## 2022-03-28 RX ADMIN — GABAPENTIN 800 MG: 400 CAPSULE ORAL at 17:07

## 2022-03-28 RX ADMIN — OXYCODONE HYDROCHLORIDE 10 MG: 10 TABLET, FILM COATED, EXTENDED RELEASE ORAL at 06:33

## 2022-03-28 RX ADMIN — FENTANYL CITRATE 50 MCG: 50 INJECTION, SOLUTION INTRAMUSCULAR; INTRAVENOUS at 07:28

## 2022-03-28 RX ADMIN — LORATADINE 10 MG: 10 TABLET ORAL at 11:27

## 2022-03-28 RX ADMIN — ACETAMINOPHEN 975 MG: 325 TABLET, FILM COATED ORAL at 11:27

## 2022-03-28 RX ADMIN — OXYCODONE HYDROCHLORIDE 5 MG: 5 TABLET ORAL at 12:28

## 2022-03-28 RX ADMIN — SODIUM CHLORIDE, SODIUM LACTATE, POTASSIUM CHLORIDE, AND CALCIUM CHLORIDE: .6; .31; .03; .02 INJECTION, SOLUTION INTRAVENOUS at 07:17

## 2022-03-28 RX ADMIN — ONDANSETRON 4 MG: 2 INJECTION INTRAMUSCULAR; INTRAVENOUS at 07:38

## 2022-03-28 RX ADMIN — DEXAMETHASONE SODIUM PHOSPHATE 4 MG: 10 INJECTION, SOLUTION INTRAMUSCULAR; INTRAVENOUS at 07:38

## 2022-03-28 RX ADMIN — GABAPENTIN 800 MG: 400 CAPSULE ORAL at 11:27

## 2022-03-28 RX ADMIN — ACETAMINOPHEN 975 MG: 325 TABLET, FILM COATED ORAL at 20:44

## 2022-03-28 RX ADMIN — PHENYLEPHRINE HYDROCHLORIDE 50 MCG/MIN: 10 INJECTION INTRAVENOUS at 07:45

## 2022-03-28 RX ADMIN — BUPIVACAINE HYDROCHLORIDE IN DEXTROSE 1.6 ML: 7.5 INJECTION, SOLUTION SUBARACHNOID at 07:33

## 2022-03-28 RX ADMIN — DOCUSATE SODIUM 100 MG: 100 CAPSULE ORAL at 11:27

## 2022-03-28 RX ADMIN — MORPHINE SULFATE 2 MG: 2 INJECTION, SOLUTION INTRAMUSCULAR; INTRAVENOUS at 14:12

## 2022-03-28 RX ADMIN — PROPOFOL 80 MCG/KG/MIN: 10 INJECTION, EMULSION INTRAVENOUS at 07:38

## 2022-03-28 RX ADMIN — ENOXAPARIN SODIUM 40 MG: 40 INJECTION SUBCUTANEOUS at 20:46

## 2022-03-28 RX ADMIN — METOPROLOL TARTRATE 75 MG: 50 TABLET, FILM COATED ORAL at 20:46

## 2022-03-28 RX ADMIN — ACETAMINOPHEN 975 MG: 325 TABLET, FILM COATED ORAL at 06:33

## 2022-03-28 NOTE — ANESTHESIA POSTPROCEDURE EVALUATION
Post-Op Assessment Note    CV Status:  Stable  Pain Score: 0    Pain management: adequate     Mental Status:  Sleepy   Hydration Status:  Stable and euvolemic   PONV Controlled:  None   Airway Patency:  Patent      Post Op Vitals Reviewed: Yes      Staff: CRNA         No complications documented      BP   110/58   Temp 97 1   Pulse 81   Resp 14   SpO2 96

## 2022-03-28 NOTE — OP NOTE
OPERATIVE REPORT  PATIENT NAME: Jesus Chávez    :  1940  MRN: [id number]  Pt Location: [facility] OR ROOM 01    SURGERY DATE: 3/28/2022    Surgeon(s) and Role:     * Marcia Guo, DO - Primary     * Elkin Ledesma PA-C - Assisting, no qualified resident was available an assistant was needed for patient positioning, prepping and draping, soft tissue retraction, protection of vital structures throughout the case, robot attachment to the table, exposure of the femur and tibia for robotic assisted resection and implantation of final prosthesis, superficial closure, and to complete the case safely  Yolanda Lyon,  ATC/OTC - 2nd Assist    Preop Diagnosis:  Primary osteoarthritis of right knee [M17 11]  Chronic pain of right knee [M25 561, G89 29]    Post-Op Diagnosis Codes:     * Primary osteoarthritis of right knee [M17 11]     * Chronic pain of right knee [M25 561, G89 29]    Procedure(s) (LRB):  ARTHROPLASTY KNEE TOTAL W ROBOT - RIGHT (Right)    Specimen(s):  * No specimens in log *    Estimated Blood Loss:   20 mL    Drains:  None  Urethral Catheter Non-latex 16 Fr  (Active)   Output (mL) 75 mL 22 1001   Number of days: 0       Anesthesia Type:   Spinal with sedation, postoperative adductor canal block with Exparel    Antibiotics:  Vancomycin 1 5 g IV    Intravenous fluids:  1 L    Urine output:  Ledesma:  45 cc    Tourniquet time:  53 minutes at 250 mmHg    Implant Name Type Inv   Item Serial No   Lot No  LRB No  Used Action   CEMENT BN 40 GM PALACOS RADIOPAQUE W/O ANTIBIOTIC - [id number]  CEMENT BN 40 GM PALACOS RADIOPAQUE W/O ANTIBIOTIC  [employer] [id number] Right 2 Implanted   INSERT TIBIAL 5MM SZ 5 CR FX BRNG ATTUNE - [id number]  INSERT TIBIAL 5MM SZ 5 CR FX BRNG ATTUNE  DEPUY [id number] Right 1 Implanted   BASEPLATE TIBIAL SZ 5 CMNT FX BRNG ATTUNE S PLUS - [id number]  BASEPLATE TIBIAL SZ 5 CMNT FX BRNG ATTUNE S PLUS  [id number] Right 1 Implanted   COMPONENT PATELLAR 32MM MEDIAL DOME ATTUNE - [id number]  COMPONENT PATELLAR 32MM MEDIAL DOME ATTUNE  [facility] [id number] Right 1 Implanted   COMPONENT FEM SZ 5 NRW RT CMNT CR ATTUNE - [id number]  COMPONENT FEM SZ 5 NRW RT CMNT CR ATTUNE  DEPUY [id number] Right 1 Implanted       Operative Indications:  Primary osteoarthritis of right knee [M17 11]  Chronic pain of right knee [M25 561, G89 29]  Patient is a very pleasant 28-year-old female known to me for treatment of her activity-related right knee pain  She underwent a left total knee arthroplasty in 2020 and did well postoperatively  Her right knee continued to cause activity-related pain and failed conservative measures of treatment for her severe osteoarthritis  She was agreeable to undergo robotic assisted right total knee arthroplasty  The risks and benefits of undergoing the procedure were discussed at length and consents were signed and placed in the chart  She was optimized for the intended procedure and underwent robotic assisted right total knee arthroplasty on 3/28/2022  Operative Findings:  Intraoperatively there was evidence of grade 4 degenerative change intraoperatively in all 3 compartments  The knee was in overall varus alignment  Intraoperative range of motion demonstrated 4° of varus alignment with a 9 degree flexion contracture with terminal flexion back to 125°  Ultimately robotic assisted right total knee arthroplasty was successfully implanted performed  A cemented right total knee arthroplasty was successfully implanted without complication  Range of motion was from 0-130 degrees with excellent stability at 0° 30° 90°  The patella tracked midline and flat  Overall limb alignment at the conclusion of the procedure was restored to 0° of varus  After closure of the arthrotomy range of motion, stability, patellar tracking were unchanged  Complications:   None    Procedure and Technique:  The patient was identified and marked in the preoperative holding area  Final questions were addressed  Consents were visualized for correct laterality and the intended procedure  Patient was taken back to the operating room where they were transferred to the operating room table and administered spinal  anesthesia by the anesthesia staff  Tourniquet was then applied to the right thigh  The right lower extremity was prepped and draped in the standard sterile fashion with the addition of the Dong knee positioner  The patient was administered 1 5 g of IV vancomycin  Tranexamic acid  was administered by the anesthesia staff  A final timeout was performed and all members of the operating room staff were in agreement of the intended procedure and the correct laterality was confirmed  An anterior knee incision was marked over the anterior right knee and carried over the medial portion of the patella down medial to the tibial tubercle  The leg was exsanguinated and the tourniquet was inflated to 250 mmHg  An incision was made over the anterior knee using a scalpel, and sharp dissection was carried deep through Mary Ann's fascia creating full thickness flaps  The extensor mechanism was identified  A standard medial parapatellar arthrotomy was performed using a scalpel, and upon doing so benign-appearing yellow intra-articular fluid was expressed  The anterior horn of the medial and lateral meniscus was removed  50% of the patellar fat pad was removed for proper exposure  The distal arthrotomy was used to initiate a medial release around the proximal tibia at the joint line to the mid coronal plane  On inspection there was diffuse grade 4 degenerative change in the medial compartment, lateral compartment, and patellofemoral compartment  Preparations were then made for robotic assisted total knee arthroplasty  The periarticular osteophytes were removed around the distal femur proximal tibia  The soft tissue remnants of the medial and lateral meniscus were removed    The ACL and the PCL were removed with electrocautery  The proximal tibial and distal femur arrays were then drilled and placed without complication  The femoral and tibial checkpoints were created  Hip center was captured  Anatomic registration was then carried out in sequence  Range of motion demonstrates that the patient had a 4 degree varus deformity with a 9 degree flexion contracture and terminal flexion back to 125°  A ligament balancing graft was then created in preparation for manipulation of the intraoperative plan  The plan was evaluated and through small manipulations in the knee femoral component size external rotation of the femur distal femoral angle and level of resection of the femur the plan created well-balanced gaps in both flexion extension and medially and laterally  The robot was attached to the table  The retractors were placed  Robotic assisted resection of the distal femur was carried out in sequence with the periarticular tissues protected  Bony fragments were removed the tibia was subluxed anteriorly  Proximal tibia was resected while the periarticular tissues were protected  With resection complete all bony fragments were removed  The knee was brought out to full extension and the posterior compartment was debrided of remaining posterior medial and posterior lateral meniscus as well as the stump of the PCL  The trochlea was then prepared for size 5 femoral component  Size 5 right CR femoral trial was placed and a size 5 mm polyethylene was placed into the articulation  The knee was cycled through a range of motion and its product was evaluated  Overall limb alignment was restored to 0° of varus with full range of motion from 0-130 degrees  This was found to be the final construct  The lug holes of the dista femur were drilled  The femoral and tibial arrays were removed without complication  The tibia was subluxed anteriorly and prepared    It was found that a size 5 tibial base plate would fit appropriately  It was aligned with the medial 1/3 of tibial tubercle and pinned into place  The tibia was then reamed, broached, physician sequence  The base plate was removed  Attention was then turned to the patella  The osteo synovial reflection was revealed using a Bovie  The patella height measured 21  millimeters prior to resection  The patella was sized and found to be a 32  mm patellar button  The cutting guide was set for the appropriate depth and the patella was evenly resected using oscillating saw  The 32  mm patellar button was then medialized over the patella and the lug holes were drilled  A trial patella button was placed upon the patella and the pre osteotomy patellar height was restored to 22 mm  A lateral facetectomy of the patella was performed  The soft tissues of the posterior capsule were cauterized using Bovie to ensure hemostasis  The posterior capsule and medial and lateral periarticular soft tissues were injected with a periarticular analgesic cocktail  The knee was again irrigated in preparation for cementation  The tibia was subluxed and all retractors were in place for cementation when final implants were confirmed and placed upon the back table  2 bags of Palacos cement without gentamicin were mixed  And the final size 5  S+ tibial implant, 5  mm AOX CR conforming polyethylene insert, size 5 narrow right CR femur, and 32 mm patella button were cemented in sequence  Excess cement was removed after each implant was in place  As the cement cured the remainder of the periarticular pain cocktail was injected into the soft tissues around the knee  Irrigation then followed with Irrisept followed by normal saline solution  After the cement had cured the joint was inspected for any residual loose bodies of cement and these were removed  The tourniquet was released after 53 minutes at 250 mmHg  The tracking and stability of the final components were assessed   The patella tracked midline without tilt, and the knee was stable in both flexion and extension, coronal stability was unchanged, and the knee demonstrated range of motion from 0-130°  The knee was again irrigated and a very conservative partial synovectomy was performed  Hemostasis was achieved using electrocautery  Closure began using a #2 barbed bidirectional suture for the arthrotomy  After closure of the arthrotomy range of motion to gravity was tested and was found to be 0-130° of flexion  Quarter percent Marcaine plain was injected in the subcutaneous soft tissues  The deep subcutaneous tissues were reapproximated with undyed 0 Vicryl, the superficial subcutaneous tissues were reapproximated with undyed 2-0 Vicryl, the skin edges reapproximated with a running 3-0 bidirectional barbed Monocryl suture, and the skin edges were sealed with Exofin  After the Exofin had dried a silver impregnated Mepilex dressing was placed over the incision  The drapes were removed and DARRYL stockings were placed on the bilateral lower extremities  Patient was then awakened from anesthesia without difficulty, and transferred to PACU in stable condition          I was present for all critical portions of the procedure    Patient Disposition:  PACU       SIGNATURE: Yaima Banda DO  DATE: March 28, 2022  TIME: 1:38 PM

## 2022-03-28 NOTE — PHYSICAL THERAPY NOTE
PHYSICAL THERAPY EVALUATION/TREATMENT     03/28/22 1315   PT Last Visit   PT Visit Date 03/28/22   Note Type   Note type Evaluation   Pain Assessment   Pain Assessment Tool 0-10   Pain Score 8   Pain Location/Orientation Orientation: Right;Location: Knee  (Pt premedicated with pain meds prior to PT)   Restrictions/Precautions   Other Precautions Pain; Fall Risk;Bed Alarm; Chair Alarm   Home Living   Type of [address] [place] [address] Two level;Bed/bath upstairs;Stairs to enter with rails;1/2 bath on main level  (2-3 BREEZY)   [address] [facility]  (RW)   Prior Function   Level of [place] Independent with ADLs and functional mobility   Lives With Spouse   Receives Help From Family   ADL Assistance Independent   IADLs Independent   Comments Pt ambulates with a cane on/off prior to admission   General   Additional Pertinent History Pt is admitted for right TKA, seen by PT postop day #0  Cognition   Overall Cognitive Status WFL   Arousal/Participation Cooperative   Orientation Level Oriented X4   Following Commands Follows all commands and directions without difficulty   Subjective   Subjective I did not expect to have this much pain   RLE Assessment   RLE Assessment WFL  (Hip/knee 2+ to 3-/5; ankle dorsiflexion 1-/5; PF 3+/5)   LLE Assessment   LLE Assessment WFL  (3+ to 4-/5)   Bed Mobility   Supine to Sit 3  Moderate assistance   Additional items Assist x 1;Verbal cues; Increased time required   Sit to Supine 3  Moderate assistance   Additional items Assist x 1;Verbal cues; Increased time required   Transfers   Sit to Stand 3  Moderate assistance   Additional items Assist x 1;Verbal cues; Increased time required  (Bed raised)   Stand to Sit 3  Moderate assistance   Additional items Assist x 1;Verbal cues; Increased time required   Ambulation/Elevation   Gait pattern Short stride; Step to   Gait Assistance 4  Minimal assist   Additional items Assist x 1;Verbal cues; Tactile cues  (Blocking right knee)   Assistive Device Rolling walker   Distance 5-6 steps to head of bed; standing rest with RW; 3 steps forward and back   Balance   Static Sitting Fair   Static Standing   (F-/F with RW)   Dynamic Standing Fair -  (With RW)   Ambulatory Fair -  (With RW)   Activity Tolerance   Activity Tolerance Patient limited by fatigue;Patient limited by pain   Assessment   Problem List Decreased strength;Decreased range of motion;Decreased endurance; Impaired balance;Decreased mobility; Decreased coordination;Decreased safety awareness;Pain   Assessment Patient seen for Physical Therapy evaluation  Patient admitted with Status post total knee replacement using cement, right  Comorbidities affecting patient's physical performance include:  Diabetic polyneuropathy, dm 2, radiculopathy of lumbar region, OA both knees, RLS, arthritis, essential hypertension, obesity, osteopenia, pacemaker, mild cognitive impairment, CKD 2  Personal factors affecting patient at time of initial evaluation include: lives in two story house, ambulating with assistive device, stairs to enter home, inability to navigate community distances, inability to navigate level surfaces without external assistance and inability to perform dynamic tasks in community  Prior to admission, patient was independent with functional mobility with cane, independent with ADLS, independent with IADLS, living with spouse in a two level home with 2-3 steps to enter, ambulating household distance and ambulating community distances  Please find objective findings from Physical Therapy assessment regarding body systems outlined above with impairments and limitations including weakness, decreased ROM, impaired balance, decreased endurance, impaired coordination, gait deviations, pain, decreased activity tolerance, decreased functional mobility tolerance, decreased safety awareness and fall risk    The Barthel Index was used as a functional outcome tool presenting with a score of Barthel Index Score: 35 today indicating marked limitations of functional mobility and ADLS  Patient's clinical presentation is currently unstable/unpredictable as seen in patient's presentation of vital sign response, changing level of pain, increased fall risk, new onset of impairment of functional mobility, decreased endurance and new onset of weakness  Pt would benefit from continued Physical Therapy treatment to address deficits as defined above and maximize level of functional mobility  As demonstrated by objective findings, the assigned level of complexity for this evaluation is high  The patient's AM-PAC Basic Mobility Inpatient Short Form Raw Score is 13  A Raw score of less than or equal to 16 suggests the patient may benefit from discharge to post-acute rehabilitation services  Please also refer to the recommendation of the Physical Therapist for safe discharge planning  Despite ampac score, pt is likely safe for discharge home with assistance of her spouse, continued ambulation with a RW and outpatient PT      Goals   Patient Goals Less pain   STG Expiration Date 04/04/22   Short Term Goal #1 Bed mobility-Min assist; transfers-Min assist   Short Term Goal #2 Pt will ambulate with a RW functional household distances-Min assist; increase balance with a RW to F or greater for safe gait and mobility and to decrease fall risk; up/down 2-3 steps with a railing and cane-Min assist so pt can enter/exit her home   LTG Expiration Date 04/11/22   Long Term Goal #1 Bed mobility and transfers-I; pt will ambulate with a RW functional household and community distances-I   Long Term Goal #2 Balance with a RW will be F+/good for safe gait mobility and to decrease fall risk; up/down 2-3 steps with a cane and railing-I so pt can enter/exit her home; active range of motion right knee 0-90 degree; strength RLE grossly 3 to 3+/5   Plan   Treatment/Interventions ADL retraining;Functional transfer training;LE strengthening/ROM; Elevations; Therapeutic exercise; Endurance training;Patient/family training;Equipment eval/education; Bed mobility;Gait training; Compensatory technique education   PT Frequency Twice a day   Recommendation   PT Discharge Recommendation Home with outpatient rehabilitation   Equipment Recommended   (Pt has a RW and a cane)   AM-PAC Basic Mobility Inpatient   Turning in Bed Without Bedrails 2   Lying on Back to Sitting on Edge of Flat Bed 2   Moving Bed to Chair 3   Standing Up From Chair 2   Walk in Room 3   Climb 3-5 Stairs 1   Basic Mobility Inpatient Raw Score 13   Basic Mobility Standardized Score 33 99   Highest Level Of Mobility   -[facility] Goal 4: Move to chair/commode   -[facility] Highest Level of Mobility 4: Move to chair/commode   -[facility] Goal Achieved Yes   Barthel Index   Feeding 10   Bathing 0   Grooming Score 0   Dressing Score 5   Bladder Score 0   Bowels Score 10   Toilet Use Score 5   Transfers (Bed/Chair) Score 5   Mobility (Level Surface) Score 0   Stairs Score 0   Barthel Index Score 35   Additional Treatment Session   Start Time 1315   End Time 1325   Treatment Assessment S:  Pt reports 8/10 pain right knee " O:  Prior to initiating transfers and ambulation pt performed RLE exercises as noted below  A:  Pt with limited tolerance to bed mobility, transfers, and short distance ambulation with a RW due to pain status post right TKA  Pt was premedicated for pain prior to PT visit  Pt will benefit from continued skilled PT services to increase her active range of motion and strength to the RLE, transfers, bed mobility, gait and functional mobility with a RW  Pt will benefit from discharge home with assist of spouse, continued ambulation with a RW and outpatient PT when medically stable for discharge  Exercises   Quad Sets 10 reps;Right;Supine   Heelslides AAROM;10 reps;Right;Supine   Hip Abduction AAROM;10 reps;Right;Supine   Ankle Pumps AAROM; Right;10 reps; Supine  (1/5 right ankle dorsiflexion likely due to anaesthesia)   End of Consult   Patient Position at End of Consult Supine;Bed/Chair alarm activated; All needs within reach   [employer] Number  Jelani Perez PT [id number]     Portions of the documentation may have been created using voice recognition software  Occasional wrong word or sound alike substitutions may have occurred due to the inherent limitation of the voice recognition software  Read the chart carefully and recognize, using context, where substitutions have occurred

## 2022-03-28 NOTE — ANESTHESIA PROCEDURE NOTES
Peripheral Block    Patient location during procedure: post-op  Start time: 3/28/2022 10:10 AM  Reason for block: at surgeon's request and post-op pain management  Staffing  Performed: Anesthesiologist   Anesthesiologist: Cliff Sauceda MD  Preanesthetic Checklist  Completed: patient identified, IV checked, site marked, risks and benefits discussed, surgical consent, monitors and equipment checked, pre-op evaluation and timeout performed  Peripheral Block  Patient position: supine  Prep: ChloraPrep  Patient monitoring: heart rate, cardiac monitor, continuous pulse ox and frequent blood pressure checks  Block type: adductor canal block  Laterality: right  Injection technique: single-shot  Procedures: ultrasound guided, Ultrasound guidance required for the procedure to increase accuracy and safety of medication placement and decrease risk of complications    Ultrasound permanent image savedbupivacaine (MARCAINE) 0 5 % perineural infiltration, 10 mL (exparel 20 ml)  Needle  Needle type: Stimuplex   Needle gauge: 22 G  Needle length: 10 cm  Needle localization: anatomical landmarks and ultrasound guidance  Needle insertion depth: 6 5 cm  Assessment  Injection assessment: incremental injection, local visualized surrounding nerve on ultrasound, negative aspiration for CSF, negative aspiration for heme and no paresthesia on injection  Paresthesia pain: none  Heart rate change: no  Slow fractionated injection: yes  Post-procedure:  site cleaned  patient tolerated the procedure well with no immediate complications

## 2022-03-28 NOTE — CASE MANAGEMENT
Case Management Discharge Planning Note    Patient name Willis Ivy  Location [address] MRN [id number]  : 1940 Date 3/28/2022       Current Admission Date: 3/28/2022  Current Admission Diagnosis:Status post total knee replacement using cement, right   Patient Active Problem List    Diagnosis Date Noted    Status post total knee replacement using cement, right 2022    Primary osteoarthritis of right knee 2022    Chronic kidney disease (CKD), stage II (mild) 10/06/2021    Exotropia of right eye 2021    Seasonal allergies 2021    Leg swelling 2020    Status post total knee replacement using cement, left     Lichen sclerosus et atrophicus of the vulva 2020    Colon polyps 2019    Pain in both feet 2019    Chronic edema 2019    Deep venous insufficiency 2019    Pacemaker     GERD (gastroesophageal reflux disease)     Mild cognitive impairment 2018    Vitamin D deficiency 2018    Carrero's esophagus with dysplasia 2018    Sensory polyneuropathy 2018    RLS (restless legs syndrome) 2018    Primary osteoarthritis of both knees 2018    Acquired deformity of foot 2018    Corns 2018    Diabetic polyneuropathy associated with type 2 diabetes mellitus ([address] ) 2018    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 ) 2018    Radiculopathy of lumbar region 2018    Atrial fibrillation ([facility] [address] ) [I48 91] 2018    Dense breast tissue on mammogram 2017    Difficulty walking 2017    Flat foot 2017    Onychomycosis 2017    Hiatal hernia     Multiple lung nodules 2015    Severe obesity (BMI 35 0-39  9) with comorbidity ([address] ) 2014    Osteopenia of multiple sites 2014    Arthritis 2014    Essential hypertension     Lichen sclerosus et atrophicus 2013    Peripheral neuropathy 2013 LOS (days): 0  Geometric Mean LOS (GMLOS) (days):   Days to GMLOS:     OBJECTIVE:     Current admission status: Outpatient Surgery   Preferred Pharmacy:   [facility] [address] [facility] [address]  Phone: [phone] Fax: [phone]    Primary Care Provider: Kika Garcia MD    Primary Insurance: MEDICARE  Secondary Insurance: COMMERCIAL MISCELLANEOUS    DISCHARGE DETAILS:    Per chart review and physical therapy documentation pt is reported to have no discharge needs at this time  Pt was admitted following elective orthopedic surgery  Plan is home with outpatient therapy  Pt already has DME at home  SW/CM will be available if needs arise      Requested [address]         Is the patient interested in [facility] at discharge?: No    DME Referral Provided  Referral made for DME?: No    Other Referral/Resources/Interventions Provided:  Interventions: None Indicated    Treatment Team Recommendation: Home (with outpatient therapy)  Discharge Destination Plan[de-identified] Home (with outpatient therapy)  Transport at Discharge : Family

## 2022-03-28 NOTE — H&P
Assessment/Plan:  1  Primary osteoarthritis of right knee  Case request operating room: ARTHROPLASTY KNEE TOTAL W ROBOT - RIGHT     Comprehensive metabolic panel     Hemoglobin A1C W/EAG Estimation     CBC and differential     Protime-INR     APTT     MRSA culture     Ambulatory referral to Cardiology     Ambulatory referral to Family Practice     Ambulatory referral to Physical Therapy     EKG 12 lead     XR chest pa & lateral     If Symptomatic, order: UA w Reflex to Microscopic w Reflex to Culture     PAT Covid Screening     Case request operating room: ARTHROPLASTY KNEE TOTAL W ROBOT - RIGHT     XR knee 3 vw right non injury   2  Chronic pain of right knee  Case request operating room: ARTHROPLASTY KNEE TOTAL W ROBOT - RIGHT     Comprehensive metabolic panel     Hemoglobin A1C W/EAG Estimation     CBC and differential     Protime-INR     APTT     MRSA culture     Ambulatory referral to Cardiology     Ambulatory referral to Family Practice     Ambulatory referral to Physical Therapy     EKG 12 lead     XR chest pa & lateral     If Symptomatic, order: UA w Reflex to Microscopic w Reflex to Culture     PAT Covid Screening     Case request operating room: ARTHROPLASTY KNEE TOTAL W ROBOT - RIGHT     XR knee 3 vw right non injury   3  History of diabetes mellitus  Ambulatory referral to [facility]   4  History of hypertension  Ambulatory referral to Cardiology   5  History of atrial fibrillation  Ambulatory referral to Cardiology   6  History of neuropathy  Ambulatory referral to [facility]   7  History of Carrero's esophagus  Ambulatory referral to [facility]   8  Pacemaker  Ambulatory referral to [facility]   9   Pre-op exam  Ambulatory referral to Cardiology     Ambulatory referral to Family Practice     Ambulatory referral to Physical Therapy           Scribe Attestation    I,:  Da Aguirre am acting as a scribe while in the presence of the attending physician :       I,:  Skip Silverman DO personally performed the services described in this documentation    as scribed in my presence  :           Please see below the last office encounter to serve as today's H& P  There have been no changes in the patient's overall medical health or orthopedic exam since last evaluation  Patient has been seen and cleared by her medical subspecialist in preparation for the procedure today  Coumadin has been held  She denies any recent fever, chills, nausea, vomiting, headache, chest pain, trouble breathing  She will be a good candidate for Lovenox bridge back to her Coumadin dosing  She will be a good candidate for outpatient physical therapy following discharge from the hospital   All questions addressed this morning  Proceed to OR for robotic assisted right total knee arthroplasty  Vitals:    03/28/22 0636   BP: 141/84   Pulse: 87   Resp: 16   Temp: (!) 97 4 °F (36 3 °C)   SpO2: 96%         Luis Thomas is a pleasant 62-year-old female who returns today for follow-up evaluation of her chronic right knee pain  Unfortunately, she continues to experience persistent pain despite extensive nonoperative management including maintenance of appropriate weight, activity modification, over-the-counter medications, exercise program, and intra-articular injection therapy  Based on the progression of her underlying disease in her pain refractory to nonoperative care, I explained that she is a candidate for robotic assisted right total knee arthroplasty  The pre nasim and postoperative expectations were discussed here in the office today  The risks and benefits of undergoing robotic assisted right total knee arthroplasty were discussed at length and consents were signed and placed in the chart   Please see risk discussion below  She denies a history of DVT/PE, MRSA infection, diabetes, malignancy  She does have a history of GI bleed    She understands she requires preoperative clearance from her primary care physician and cardiologist   It would again be my preference to bridge postoperatively with Lovenox for 5 days back to Coumadin  She is an excellent candidate for outpatient physical therapy postoperatively  She will meet with my surgery scheduler today to pick a date for the procedure and make preoperative arrangements  All of her questions and concerns were addressed today  We will see her back at time of surgery      Subjective: Follow up evaluation for chronic right knee pain     Patient ID: Anthony Coleman is a 80 y o  female who returns today for follow-up evaluation of her chronic right knee pain  She has been receiving nonoperative treatment for her right knee pain due to end-stage underlying osteoarthritis  She received a corticosteroid injection in July of 2021  At today's visit, she states that the injection was mostly ineffective at relieving her pain  Over the last few months, she complains of worsening of her activity related medial-sided aching knee pain  This can be severe with activity  She has done well following left total knee arthroplasty and would like to pursue this for her right knee  She denies any new injury or trauma      Review of Systems   Constitutional: Positive for activity change  Negative for chills, fever and unexpected weight change  HENT: Negative for hearing loss, nosebleeds and sore throat  Eyes: Negative for pain, redness and visual disturbance  Respiratory: Negative for cough, shortness of breath and wheezing  Cardiovascular: Negative for chest pain, palpitations and leg swelling  Gastrointestinal: Negative for abdominal pain, nausea and vomiting  Endocrine: Negative for polydipsia and polyuria  Genitourinary: Negative for dysuria and hematuria  Musculoskeletal: Positive for arthralgias and myalgias  Negative for joint swelling  See HPI   Skin: Negative for rash and wound  Neurological: Negative for dizziness, numbness and headaches  Psychiatric/Behavioral: Negative for decreased concentration and suicidal ideas  The patient is not nervous/anxious              Medical History        Past Medical History:   Diagnosis Date    Atrial fibrillation ([facility] [address] )      Carrero's esophagus       last assessed: 1/23/2018    BRCA1 negative      BRCA2 negative      Breast cancer ([facility] [address] ) 2006     stage 1 (left), given adjuvant radiation with Arimidex x 5 years    Cancer [facility]) 2006     Left Breast, Lumpectomy    Cataract       last assessed: 3/11/2014    Dysplasia of toenail       last assessed: 8/29/2017    Esophageal reflux      GERD (gastroesophageal reflux disease)      Gross hematuria       last assessed: 2/19/2015    Hematuria      Hiatal hernia      History of radiation therapy      Hypertension      Irregular heart beat       AFIB    Mixed sensory-motor polyneuropathy      Neuropathy      Obesity      Pacemaker      Paroxysmal atrial fibrillation (HCC)      Peripheral neuropathy      Rectal bleeding      Restless leg syndrome      Shortness of breath       last assessed: 1/11/2016            Surgical History         Past Surgical History:   Procedure Laterality Date    BREAST BIOPSY Left 2006    BREAST LUMPECTOMY Left 2006     onset: 2006    BREAST SURGERY        CARDIAC PACEMAKER PLACEMENT         x 3 2006    CATARACT EXTRACTION        COLONOSCOPY        CYSTOSCOPY   04/04/2014     diagnostic    HYSTERECTOMY         ALISON BSO; due to fibroid uterus; age 38   Ardeth Needs KNEE CARTILAGE SURGERY         excision lesion of meniscus or capsule knee    KNEE SURGERY        OOPHORECTOMY Bilateral      OTHER SURGICAL HISTORY         radiation therapy    MD COLONOSCOPY FLX DX W/COLLJ SPEC WHEN PFRMD N/A 2/8/2017     Procedure: COLONOSCOPY;  Surgeon: Tom Wall MD;  [facility]: [facility]; Service: Gastroenterology    MD ESOPHAGOGASTRODUODENOSCOPY TRANSORAL DIAGNOSTIC N/A 9/20/2017     Procedure: ESOPHAGOGASTRODUODENOSCOPY (EGD);   Surgeon: Kalpana Trent MD;  Location:  GI LAB;   Service: Gastroenterology    MS TOTAL KNEE ARTHROPLASTY Left 2020     Procedure: ARTHROPLASTY KNEE TOTAL;  Surgeon: Yaima Banda DO;  Location: [facility];  Service: Orthopedics    UPPER GASTROINTESTINAL ENDOSCOPY                      Family History   Problem Relation Age of Onset    Hypertension Mother      Heart disease Father      Aneurysm Father      Coronary artery disease Father           in his 76s with aneurysm    Aortic aneurysm Father           abdominal    Scleroderma Sister      Breast cancer Sister 76    Hypertension Sister      Cancer Sister      No Known Problems Son      No Known Problems Son      Testicular cancer Son 39    Thyroid cancer Son 45    Colon cancer Maternal Aunt      Colon cancer Maternal Aunt      Breast cancer Other 48         kaylee's daughter    Alcohol abuse Neg Hx      Substance Abuse Neg Hx      Mental illness Neg Hx      Depression Neg Hx           Social History            Occupational History    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        Comment: retired   Tobacco Use    Smoking status: Former Smoker       Packs/day: 1 00       Years: 25 00       Pack years: 25 00       Types: Cigarettes       Quit date: 1980       Years since quittin 4    Smokeless tobacco: Never Used    Tobacco comment: Quit over 30 years ago; quit age 41   Vaping Use    Vaping Use: Never used   Substance and Sexual Activity    Alcohol use: Not Currently    Drug use: No    Sexual activity: Not on file            Current Outpatient Medications:     acetaminophen (TYLENOL) 650 mg CR tablet, Take 1,300 mg by mouth daily as needed  , Disp: , Rfl:     Calcium Acetate, Phos Binder, (CALCIUM ACETATE PO), Take by mouth daily  , Disp: , Rfl:     clobetasol (TEMOVATE) 0 05 % ointment, Apply once weekly to the affected area, Disp: 30 g, Rfl: 3    famotidine (PEPCID) 40 MG tablet, TAKE 1 TABLET BY MOUTH EVERY DAY, Disp: 90 tablet, Rfl: 1    Flovent  MCG/ACT inhaler, INHALE 2 PUFFS 2 (TWO) TIMES A DAY RINSE MOUTH AFTER USE , Disp: 12 g, Rfl: 1    fluticasone (FLONASE) 50 mcg/act nasal spray, SPRAY 1 SPRAY INTO EACH NOSTRIL EVERY DAY, Disp: 16 mL, Rfl: 1    furosemide (LASIX) 40 mg tablet, TAKE 1 TABLET BY MOUTH EVERY DAY (Patient taking differently: 40 mg ), Disp: 90 tablet, Rfl: 3    gabapentin (NEURONTIN) 800 mg tablet, Take 1 tablet (800 mg total) by mouth 4 (four) times a day, Disp: 360 tablet, Rfl: 1    lisinopril (ZESTRIL) 2 5 mg tablet, TAKE 1 TABLET BY MOUTH EVERY DAY, Disp: 90 tablet, Rfl: 0    loratadine (CLARITIN) 10 mg tablet, Take 10 mg by mouth daily, Disp: , Rfl:     magnesium 30 MG tablet, Take 30 mg by mouth 2 (two) times a day, Disp: , Rfl:     metoprolol tartrate (LOPRESSOR) 50 mg tablet, Take 1 tablet (50 mg total) by mouth every 12 (twelve) hours, Disp: 180 tablet, Rfl: 3    multivitamin (THERAGRAN) TABS, Take 1 tablet by mouth daily, Disp: , Rfl:     mupirocin (BACTROBAN) 2 % ointment,  , Disp: , Rfl:     pramipexole (MIRAPEX) 0 5 mg tablet, 2 FULL TABLETS TWICE A DAY AT 5:00 P  M   AND 10:00 P M , Disp: 360 tablet, Rfl: 1    sodium chloride (OCEAN) 0 65 % nasal spray, 1 spray into each nostril 2 (two) times a day as needed for rhinitis, Disp: 60 mL, Rfl: 1    warfarin (Coumadin) 5 mg tablet, TAKE 2 TABS BY MOUTH DAILY OR AS DIRECTED BY PHYSICIAN, Disp: 90 tablet, Rfl: 2    warfarin (COUMADIN) 7 5 mg tablet, TAKE 1 TABLET BY MOUTH EVERY DAY, Disp: 90 tablet, Rfl: 3           Allergies   Allergen Reactions    Latex         Added based on information entered during case entry, please review and add reactions, type, and severity as needed    Cephalexin Rash    Duloxetine Hcl Other (See Comments)       Facial pins and needles sensation    Erythromycin Rash    Levofloxacin Other (See Comments)       Muscular aches    Penicillins Rash    Savella [Milnacipran] Rash    Sulfa Antibiotics Rash         Objective:      Vitals:     02/04/22 1023   BP: 115/78   Pulse: 90         Body mass index is 39 14 kg/m²      Right Knee Exam      Tenderness   The patient is experiencing tenderness in the medial joint line (no joint line tenderness)      Range of Motion   Right knee extension: 5  Flexion: 120      Tests   Ned:  Medial - negative Lateral - negative  Varus: negative Valgus: negative  Lachman:  Anterior - negative         Other   Erythema: absent  Sensation: normal  Pulse: present  Swelling: none  Effusion: no effusion present     Comments:  7-10 degree varus deformity passively correctable  Stable at 0, 30 90   Neurovascularly intact distally  No warmth or erythema  Parapatellar crepitance noted  Patellofemoral grind:  Positive              Observations      Right Knee   Negative for effusion          Physical Exam  Vitals and nursing note reviewed  Constitutional:       Appearance: She is well-developed  HENT:      Head: Normocephalic and atraumatic  Eyes:      General: No scleral icterus  Conjunctiva/sclera: Conjunctivae normal    Cardiovascular:      Rate and Rhythm: Normal rate  Pulmonary:      Effort: Pulmonary effort is normal  No respiratory distress  Musculoskeletal:      Cervical back: Normal range of motion and neck supple  Right knee: No effusion  Instability Tests: Medial Ned test negative and lateral Ned test negative  Comments: As noted in HPI   Skin:     General: Skin is warm and dry  Neurological:      Mental Status: She is alert and oriented to person, place, and time  Psychiatric:         Behavior: Behavior normal             I have personally reviewed pertinent films in PACS  X-ray of the right knee with magnification marker obtained on 02/04/2022 reviewed demonstrating end-stage degenerative change in a varus pattern with bone-on-bone contact medially  There is sclerosis and marginal osteophytosis    There is no acute fracture, dislocation, lytic or blastic lesion      The patient was counseled in detail regarding the diagnosis, the treatment options available, the prognosis of each treatment option, the potential risks and complications  Jesus Kemp are, but are not limited to; deep vein thrombosis, pulmonary embolism, neurologic and vascular injury, infection, instability, leg length discrepancy, dislocation, hematoma, reflex sympathetic dystrophy, loss of range of motion, ankylosis of the knee, fracture, screw or prosthetic perforation, chronic pain, acute pain, chronic leg pain and edema, loosening, death, heart attack, and stroke   The patient's questions were answered in detail   The patient demonstrates understanding of these risks and wishes to proceed with surgery

## 2022-03-28 NOTE — ANESTHESIA PROCEDURE NOTES
Spinal Block    Start time: 3/28/2022 7:33 AM  Reason for block: at surgeon's request and primary anesthetic  Staffing  Performed: Anesthesiologist   Anesthesiologist: Chapin Maciel MD  Preanesthetic Checklist  Completed: patient identified, IV checked, site marked, risks and benefits discussed, surgical consent, monitors and equipment checked, pre-op evaluation and timeout performed  Spinal Block  Patient position: sitting  Prep: Betadine  Patient monitoring: heart rate, cardiac monitor, continuous pulse ox and frequent blood pressure checks  Approach: midline  Location: L3-4  Injection technique: single-shot  Needle  Needle type: pencil-tip   Needle gauge: 25 G  Needle length: 8 9  Assessment  Sensory level: T4  Injection Assessment:  positive aspiration for clear CSF, no paresthesia on injection and negative aspiration for heme    Post-procedure:  site cleaned  Additional Notes  CRNA tried x1  MD placed after one attempt

## 2022-03-28 NOTE — PERIOPERATIVE NURSING NOTE
Pedal pulse positive and strong with doppler  Ice maintained as well as soy stocking  Xray completed

## 2022-03-28 NOTE — PROGRESS NOTES
Progress Note - Orthopedics   Jesus Chávez 80 y o  female MRN: [id number]  Unit/Bed#: 2 [address] Encounter: [id number]    Assessment:  1) POD#0 s/p RA R TKA    Plan:  Vanco 1 5g IV x 1 for 24 hours postop  DVT prophylaxis:  Lovenox 40 mg subQ q day/SCD's/Ambulation-will utilize Lovenox bridge back to Coumadin  Will restart Coumadin when risk of postoperative hematoma declines  WBAT RLE  PT/OT- WBAT RLE  Analgesia PRN  Follow up AM labs  D/c noble in AM POD #1  Dressing- monitor for drainage  Discharge planning - disposition pending progress with PT postoperatively  Plan for discharge home to start outpatient physical therapy later this week    Weight bearing: WBAT RLE    VTE Pharmacologic Prophylaxis: Enoxaparin (Lovenox)  VTE Mechanical Prophylaxis: sequential compression device    Subjective:  Patient seen examined postoperatively  Pain relatively well controlled, events of surgery discussed with the patient the patient's  via telephone  Vitals: Blood pressure 136/87, pulse 71, temperature (!) 97 3 °F (36 3 °C), temperature source Oral, resp  rate 20, height 5' 4 5" (1 638 m), weight 103 kg (227 lb 12 8 oz), SpO2 96 %  ,Body mass index is 38 5 kg/m²  Intake/Output Summary (Last 24 hours) at 3/28/2022 1352  Last data filed at 3/28/2022 1001  Gross per 24 hour   Intake 1100 ml   Output 140 ml   Net 960 ml       Invasive Devices  Report    Peripheral Intravenous Line            Peripheral IV 03/28/22 Right Antecubital <1 day          Drain            Urethral Catheter Non-latex 16 Fr  <1 day                Physical Exam: NAD  Ortho Exam: RLE: Dsg c/d/i, compartments soft, calf non-tender, +PF/DF/EHL, +DP/SP/Saph/Sural SILT, DP 2+, foot warm    Lab, Imaging and other studies: PO XR R knee:  Reviewed and acceptable    Shavonne MELENDEZ    Division of Adult Reconstruction  Department of Orthopaedic Surgery  [facility]

## 2022-03-29 VITALS
BODY MASS INDEX: 37.95 KG/M2 | DIASTOLIC BLOOD PRESSURE: 67 MMHG | HEIGHT: 65 IN | OXYGEN SATURATION: 94 % | TEMPERATURE: 97 F | HEART RATE: 69 BPM | WEIGHT: 227.8 LBS | SYSTOLIC BLOOD PRESSURE: 113 MMHG | RESPIRATION RATE: 16 BRPM

## 2022-03-29 LAB
ANION GAP SERPL CALCULATED.3IONS-SCNC: 8 MMOL/L (ref 4–13)
BASOPHILS # BLD AUTO: 0.03 THOUSANDS/ΜL (ref 0–0.1)
BASOPHILS NFR BLD AUTO: 0 % (ref 0–1)
BUN SERPL-MCNC: 22 MG/DL (ref 5–25)
CALCIUM SERPL-MCNC: 8.4 MG/DL (ref 8.3–10.1)
CHLORIDE SERPL-SCNC: 103 MMOL/L (ref 100–108)
CO2 SERPL-SCNC: 25 MMOL/L (ref 21–32)
CREAT SERPL-MCNC: 1.02 MG/DL (ref 0.6–1.3)
EOSINOPHIL # BLD AUTO: 0 THOUSAND/ΜL (ref 0–0.61)
EOSINOPHIL NFR BLD AUTO: 0 % (ref 0–6)
ERYTHROCYTE [DISTWIDTH] IN BLOOD BY AUTOMATED COUNT: 14.5 % (ref 11.6–15.1)
GFR SERPL CREATININE-BSD FRML MDRD: 52 ML/MIN/1.73SQ M
GLUCOSE SERPL-MCNC: 169 MG/DL (ref 65–140)
HCT VFR BLD AUTO: 39.8 % (ref 34.8–46.1)
HGB BLD-MCNC: 11.9 G/DL (ref 11.5–15.4)
IMM GRANULOCYTES # BLD AUTO: 0.04 THOUSAND/UL (ref 0–0.2)
IMM GRANULOCYTES NFR BLD AUTO: 0 % (ref 0–2)
LYMPHOCYTES # BLD AUTO: 0.95 THOUSANDS/ΜL (ref 0.6–4.47)
LYMPHOCYTES NFR BLD AUTO: 8 % (ref 14–44)
MCH RBC QN AUTO: 30 PG (ref 26.8–34.3)
MCHC RBC AUTO-ENTMCNC: 29.9 G/DL (ref 31.4–37.4)
MCV RBC AUTO: 100 FL (ref 82–98)
MONOCYTES # BLD AUTO: 0.71 THOUSAND/ΜL (ref 0.17–1.22)
MONOCYTES NFR BLD AUTO: 6 % (ref 4–12)
NEUTROPHILS # BLD AUTO: 10.94 THOUSANDS/ΜL (ref 1.85–7.62)
NEUTS SEG NFR BLD AUTO: 86 % (ref 43–75)
NRBC BLD AUTO-RTO: 0 /100 WBCS
PLATELET # BLD AUTO: 183 THOUSANDS/UL (ref 149–390)
PMV BLD AUTO: 11.4 FL (ref 8.9–12.7)
POTASSIUM SERPL-SCNC: 5.2 MMOL/L (ref 3.5–5.3)
RBC # BLD AUTO: 3.97 MILLION/UL (ref 3.81–5.12)
SODIUM SERPL-SCNC: 136 MMOL/L (ref 136–145)
WBC # BLD AUTO: 12.67 THOUSAND/UL (ref 4.31–10.16)

## 2022-03-29 PROCEDURE — 80048 BASIC METABOLIC PNL TOTAL CA: CPT | Performed by: PHYSICIAN ASSISTANT

## 2022-03-29 PROCEDURE — 97116 GAIT TRAINING THERAPY: CPT

## 2022-03-29 PROCEDURE — 97167 OT EVAL HIGH COMPLEX 60 MIN: CPT

## 2022-03-29 PROCEDURE — 97535 SELF CARE MNGMENT TRAINING: CPT

## 2022-03-29 PROCEDURE — 85025 COMPLETE CBC W/AUTO DIFF WBC: CPT | Performed by: PHYSICIAN ASSISTANT

## 2022-03-29 PROCEDURE — 99024 POSTOP FOLLOW-UP VISIT: CPT | Performed by: ORTHOPAEDIC SURGERY

## 2022-03-29 PROCEDURE — 97110 THERAPEUTIC EXERCISES: CPT

## 2022-03-29 RX ORDER — OXYCODONE HYDROCHLORIDE 5 MG/1
TABLET ORAL
Qty: 30 TABLET | Refills: 0 | Status: SHIPPED | OUTPATIENT
Start: 2022-03-29 | End: 2022-04-05 | Stop reason: SDUPTHER

## 2022-03-29 RX ORDER — DOCUSATE SODIUM 100 MG/1
100 CAPSULE, LIQUID FILLED ORAL 2 TIMES DAILY
Qty: 60 CAPSULE | Refills: 0 | Status: SHIPPED | OUTPATIENT
Start: 2022-03-29 | End: 2022-06-20 | Stop reason: ALTCHOICE

## 2022-03-29 RX ORDER — ACETAMINOPHEN 325 MG/1
975 TABLET ORAL EVERY 8 HOURS
Refills: 0
Start: 2022-03-29

## 2022-03-29 RX ADMIN — LORATADINE 10 MG: 10 TABLET ORAL at 08:24

## 2022-03-29 RX ADMIN — OXYCODONE HYDROCHLORIDE 5 MG: 5 TABLET ORAL at 08:22

## 2022-03-29 RX ADMIN — SODIUM CHLORIDE 75 ML/HR: 0.9 INJECTION, SOLUTION INTRAVENOUS at 06:35

## 2022-03-29 RX ADMIN — MORPHINE SULFATE 2 MG: 2 INJECTION, SOLUTION INTRAMUSCULAR; INTRAVENOUS at 10:02

## 2022-03-29 RX ADMIN — PANTOPRAZOLE SODIUM 40 MG: 40 TABLET, DELAYED RELEASE ORAL at 08:23

## 2022-03-29 RX ADMIN — FUROSEMIDE 40 MG: 40 TABLET ORAL at 08:22

## 2022-03-29 RX ADMIN — GABAPENTIN 800 MG: 400 CAPSULE ORAL at 12:04

## 2022-03-29 RX ADMIN — ACETAMINOPHEN 975 MG: 325 TABLET, FILM COATED ORAL at 04:18

## 2022-03-29 RX ADMIN — DEXAMETHASONE SODIUM PHOSPHATE 10 MG: 10 INJECTION INTRAMUSCULAR; INTRAVENOUS at 08:24

## 2022-03-29 RX ADMIN — DOCUSATE SODIUM 100 MG: 100 CAPSULE ORAL at 08:23

## 2022-03-29 RX ADMIN — ACETAMINOPHEN 975 MG: 325 TABLET, FILM COATED ORAL at 12:04

## 2022-03-29 RX ADMIN — OXYCODONE HYDROCHLORIDE 5 MG: 5 TABLET ORAL at 02:17

## 2022-03-29 RX ADMIN — GABAPENTIN 800 MG: 400 CAPSULE ORAL at 08:23

## 2022-03-29 NOTE — PHYSICAL THERAPY NOTE
PT TREATMENT     03/29/22 0955   PT Last Visit   PT Visit Date 03/29/22   Note Type   Note Type BID visit/treatment   Pain Assessment   Pain Assessment Tool 0-10   Pain Score 4  (4/10 initially; then 6/10 with activity)   Pain Location/Orientation Orientation: Right;Location: Knee   Restrictions/Precautions   Other Precautions Fall Risk;Pain   General   Chart Reviewed Yes   Subjective   Subjective Better   Bed Mobility   Supine to Sit 4  Minimal assistance   Additional items Assist x 1;Verbal cues;LE management   Transfers   Sit to Stand   (Min assist/close S)   Additional items Assist x 1;Verbal cues   Stand to Sit 5  Supervision   Additional items Verbal cues; Assist x 1   Ambulation/Elevation   Gait pattern   (Slow phuc)   Gait Assistance   (Close S)   Additional items Assist x 1;Verbal cues; Tactile cues   Assistive Device Rolling walker   Distance 80 ft x 2 with change in direction and rest in between walks   Stair Management Assistance   (Close S)   Additional items Assist x 1;Verbal cues; Tactile cues   Stair Management Technique Step to pattern; One rail R;With cane   Number of Stairs 4   Balance   Static Sitting Good   Static Standing Fair   Dynamic Standing Fair   Ambulatory   (F-initially, then F)   Activity Tolerance   Activity Tolerance Patient limited by fatigue;Patient limited by pain   Exercises   Quad Sets 10 reps;Right;Supine   Heelslides AAROM;10 reps;Right;Supine   Ankle Pumps 10 reps;Right;Supine   Assessment   Assessment Pt is making progress with bed mobility, transfers, ambulation with a RW and gait on stairs status post right TKA  Gait is very slow and takes increased time  Pt will continue to benefit from skilled physical therapy services to increase her active range of motion and strength to the right knee, gait with a RW and overall functional mobility  Pt is likely safe for discharge home this afternoon after 2nd session of physical therapy      The patient's AM-PAC Basic Mobility Inpatient Short Form Raw Score is 18  A Raw score of greater than 16 suggests the patient may benefit from discharge to home  Please also refer to the recommendation of the Physical Therapist for safe discharge planning  Plan   Treatment/Interventions ADL retraining;Functional transfer training;LE strengthening/ROM; Elevations; Therapeutic exercise; Endurance training;Patient/family training;Equipment eval/education; Bed mobility;Gait training; Compensatory technique education   PT Frequency Twice a day   Recommendation   PT Discharge Recommendation Home with outpatient rehabilitation   [address] Mobility Inpatient   Turning in Bed Without Bedrails 3   Lying on Back to Sitting on Edge of Flat Bed 3   Moving Bed to Chair 3   Standing Up From Chair 3   Walk in Room 3   Climb 3-5 Stairs 3   Basic Mobility Inpatient Raw Score 18   Basic Mobility Standardized Score 41 05   Highest Level Of Mobility   JH-HLM Goal 6: Walk 10 steps or more   JH-HLM Highest Level of Mobility 7: Walk 25 feet or more   JH-HLM Goal Achieved Yes   Education   Education Provided Mobility training;Home exercise program;Assistive device   Patient Explanation/teachback used; Reinforcement needed   End of Consult   Patient Position at End of Consult Bedside chair; All needs within reach  (OT working with pt)   [address] Number  Taya Huber PT [phone]     Portions of the documentation may have been created using voice recognition software  Occasional wrong word or sound alike substitutions may have occurred due to the inherent limitation of the voice recognition software  Read the chart carefully and recognize, using context, where substitutions have occurred

## 2022-03-29 NOTE — NURSING NOTE
Pt discharged to home via wheelchair  Transported home by spouse  Discharge instructions reviewed with pt at bedside  Medications e-scribed to pt's confirmed pharmacy  All questions/concerns addressed  Pt is stable at time of discharge

## 2022-03-29 NOTE — PHYSICAL THERAPY NOTE
PT TREATMENT     03/29/22 1440   PT Last Visit   PT Visit Date 03/29/22   Note Type   Note Type BID visit/treatment   Pain Assessment   Pain Assessment Tool 0-10   Pain Score 5   Pain Location/Orientation Orientation: Right;Location: Knee   Restrictions/Precautions   Other Precautions Fall Risk;Pain   General   Chart Reviewed Yes   Subjective   Subjective I am ready to go   Transfers   Sit to Stand   (S/I)   Stand to Sit   (S/I)   Ambulation/Elevation   Gait pattern   (Slow phuc)   Gait Assistance   (S/I)   Assistive Device Rolling walker   Distance 100 ft with changing direction; pt with slow phuc; stops frequently for short rest breaks   Stair Management Assistance   (Pt declined stairs this afternoon)   Additional items   (Verbally reviewed stair training with pt)   Stair Management Technique   (Pt with good understanding of stair review)   Balance   Static Sitting Good   Static Standing Fair +   Dynamic Standing Fair   Ambulatory Fair   Activity Tolerance   Activity Tolerance Patient tolerated treatment well;Patient limited by fatigue;Patient limited by pain   Assessment   Assessment Pt received by PT standing in room with her RW  Pt reports that she is anxious to get home  Pt also reports that she has been ambulating independently in her room and just finished using the bathroom independently  Pt with good tolerance to PT session for transfers and gait training, as well as verbal stair review  Pt remains appropriate for discharge this afternoon and Dr Severino Mendoza is aware  Pt will benefit from continued skilled physical therapy services as an outpatient  The patient's AM-PAC Basic Mobility Inpatient Short Form Raw Score is 22  A Raw score of greater than 16 suggests the patient may benefit from discharge to home  Please also refer to the recommendation of the Physical Therapist for safe discharge planning     Plan   Treatment/Interventions ADL retraining;Functional transfer training;LE strengthening/ROM; Elevations; Therapeutic exercise; Endurance training;Patient/family training;Equipment eval/education; Bed mobility;Gait training; Compensatory technique education   PT Frequency Twice a day   Recommendation   PT Discharge Recommendation Home with outpatient rehabilitation   AM-PAC Basic Mobility Inpatient   Turning in Bed Without Bedrails 3   Lying on Back to Sitting on Edge of Flat Bed 4   Moving Bed to Chair 4   Standing Up From Chair 4   Walk in Room 4   Climb 3-5 Stairs 3   Basic Mobility Inpatient Raw Score 22   Basic Mobility Standardized Score 47 4   Highest Level Of Mobility   JH-HLM Goal 7: Walk 25 feet or more   JH-HLM Highest Level of Mobility 7: Walk 25 feet or more   JH-HLM Goal Achieved Yes   Education   Education Provided Mobility training;Home exercise program;Assistive device   Patient Demonstrates acceptance/verbal understanding;Explanation/teachback used;Demonstrates verbal understanding   End of Consult   Patient Position at End of Consult Bedside chair; All needs within reach   [address] Number  Jada Rajput PT [id number]     Portions of the documentation may have been created using voice recognition software  Occasional wrong word or sound alike substitutions may have occurred due to the inherent limitation of the voice recognition software  Read the chart carefully and recognize, using context, where substitutions have occurred

## 2022-03-29 NOTE — OCCUPATIONAL THERAPY NOTE
Occupational Therapy Evaluation/Treatment       03/29/22 1035   Note Type   Note type Evaluation   Restrictions/Precautions   Other Precautions Pain; Fall Risk   Pain Assessment   Pain Assessment Tool 0-10   Pain Score 4   Pain Location/Orientation Orientation: Right;Location: Knee   Home Living   Type of [address] [facility] Two level;Bed/bath upstairs;1/2 bath on main level  (2-3 BREEZY)   Bathroom Shower/Tub Tub/shower unit   Bathroom Equipment Shower chair;Commode  (will have commode over toilet at home)   [address] [facility]  (pt reports spouse puts socks on a baseline and will continue)   Additional Comments pt plans to stay on 1st floor and sponge bath upon discharge home   Prior Function   Level of [place] Independent with ADLs and functional mobility  (assist for dressing)   Lives With Spouse   Receives Help From Family   ADL Assistance Needs assistance   IADLs Needs assistance   ADL   Eating Assistance 7  Independent   Grooming Assistance 7  Independent   UB Bathing Assistance 5  Supervision/Setup   LB Bathing Assistance 4  Minimal Assistance   UB Dressing Assistance 5  Supervision/Setup   LB Dressing Assistance 4  Minimal Assistance   Toileting Assistance  4  Minimal Assistance   Transfers   Sit to Stand 4  Minimal assistance   Additional items Assist x 1;Verbal cues   Stand to Sit 5  Supervision   Functional Mobility   Functional Mobility 4  Minimal assistance   Additional Comments 15 feet   Additional items Rolling walker   Balance   Static Sitting Good   Dynamic Sitting Fair +   Static Standing Fair   Dynamic Standing Fair   Activity Tolerance   Activity Tolerance Patient limited by fatigue;Patient limited by pain   RUE Assessment   RUE Assessment WFL   LUE Assessment   LUE Assessment WFL   Cognition   Overall Cognitive Status WFL   Arousal/Participation Cooperative   Attention Within functional limits   Orientation Level Oriented X4   Following Commands Follows all commands and directions without difficulty   Assessment   Limitation Decreased ADL status; Decreased Safe judgement during ADL;Decreased endurance;Decreased self-care trans;Decreased high-level ADLs  (decreased balance and mobility )   Prognosis Good   Assessment Patient evaluated by Occupational Therapy  Patient admitted with Status post total knee replacement using cement, right  The patients occupational profile, medical and therapy history includes a extensive additional review of physical, cognitive, or psychosocial history related to current functional performance  Comorbidities affecting functional mobility and ADLS include: Afib, arthritis, cancer, hypertension, neuropathy and pacemaker  Prior to admission, patient was independent with functional mobility with and without cane, requiring assist for ADLS and requiring assist for IADLS  The evaluation identifies the following performance deficits: weakness, impaired balance, decreased endurance, increased fall risk, new onset of impairment of functional mobility, decreased ADLS, decreased IADLS, pain, decreased activity tolerance, decreased safety awareness, impaired judgement and decreased strength, that result in activity limitations and/or participation restrictions  This evaluation requires clinical decision making of high complexity, because the patient presents with comorbidites that affect occupational performance and required significant modification of tasks or assistance with consideration of multiple treatment options  The Barthel Index was used as a functional outcome tool presenting with a score of Barthel Index Score: 60, indicating marked limitations of functional mobility and ADLS  The patient's raw score on the -PAC Daily Activity inpatient short form is 21, standardized score is 44 27, greater than 39 4  Patients at this level are likely to benefit from DC to home  Please refer to the recommendation of the Occupational Therapist for safe DC planning   Patient will benefit from skilled Occupational Therapy services to address above deficits and facilitate a safe return to prior level of function  Goals   Patient Goals less pain   STG Time Frame   (1-7 days)   Short Term Goal  Goals established to promote Patient Goals: less pain:  Patient will increase standing tolerance to 3 minutes during ADL task to decrease assistance level and decrease fall risk; Patient will increase bed mobility to supervision in preparation for ADLS and transfers; Patient will increase functional mobility to and from bathroom with rolling walker with supervision to increase performance with ADLS and to use a toilet; Patient will tolerate 10 minutes of UE ROM/strengthening to increase general activity tolerance and performance in ADLS/IADLS; Patient will improve functional activity tolerance to 10 minutes of sustained functional tasks to increase participation in basic self-care and decrease assistance level; Patient will increase dynamic standing balance to fair+ to improve postural stability and decrease fall risk during standing ADLS and transfers  LTG Time Frame   (8-14 days)   Long Term Goal Patient will increase standing tolerance to 6 minutes during ADL task to decrease assistance level and decrease fall risk; Patient will increase bed mobility to independent in preparation for ADLS and transfers; Patient will increase functional mobility to and from bathroom with rolling walker independently to increase performance with ADLS and to use a toilet; Patient will tolerate 20 minutes of UE ROM/strengthening to increase general activity tolerance and performance in ADLS/IADLS; Patient will improve functional activity tolerance to 20 minutes of sustained functional tasks to increase participation in basic self-care and decrease assistance level; Patient will increase dynamic standing balance to good to improve postural stability and decrease fall risk during standing ADLS and transfers    Pt will score >/= 24/24 on AM-PAC Daily Activity Inpatient scale to promote safe independence with ADLs and functional mobility; Pt will score >/= 90/100 on Barthel Index in order to decrease caregiver assistance needed and increase ability to perform ADLs and functional mobility  Functional Transfer Goals   Pt Will Perform All Functional Transfers   (STG supervision LTG independent)   ADL Goals   Pt Will Perform Bathing   (STG supervision LTG independent )   Pt Will Perform LE Dressing   (STG supervision LTG independent )   Pt Will Perform Toileting   (STG supervision LTG independent )   Plan   Treatment Interventions ADL retraining;Functional transfer training; Endurance training;Patient/family training;Equipment evaluation/education; Activityengagement; Compensatory technique education   Goal Expiration Date 04/12/22   OT Frequency 5x/wk   Additional Treatment Session   Start Time 1020   End Time 1035   Treatment Assessment S: 4/10 L knee pain  O: Instructed pt on car transfers, verbalized understanding  Toilet transfer with commode over toilet to simulate home setup with supervision  Hygiene in stance with supervision with setup for urination  Functional mobility 15 feet with RW supervision  Patient donned bra and shirt in stance with supervision  Patient donned underwear and pants with supervision  Patient donned slip on shoes with min assist   Pt instructed on use of reacher but didnt use for dressing only to take off socks and spouse completes that for her, declined sock aid instruction  A: Tolerated well  Cooperative and pleasant  Patient has a supportive family at home to assist with ADLS  Patient is OK from an OT standpoint for discharge today with family support    P: outpatient PT   Recommendation   OT Discharge Recommendation Home with outpatient rehabilitation   AM-[facility] Daily Activity Inpatient   Lower Body Dressing 3   Bathing 3   Toileting 3   Upper Body Dressing 4   Grooming 4   Eating 4   Daily Activity Raw Score 21   Daily Activity Standardized Score (Calc for Raw Score >=11) 44 27   AM-PAC Applied Cognition Inpatient   Following a Speech/Presentation 4   Understanding Ordinary Conversation 4   Taking Medications 4   Remembering Where Things Are Placed or Put Away 4   Remembering List of 4-5 Errands 4   Taking Care of Complicated Tasks 4   Applied Cognition Raw Score 24   Applied Cognition Standardized Score 62 21   Barthel Index   Feeding 10   Bathing 0   Grooming Score 5   Dressing Score 5   Bladder Score 10   Bowels Score 10   Toilet Use Score 5   Transfers (Bed/Chair) Score 10   Mobility (Level Surface) Score 0   Stairs Score 5   Barthel Index Score 60   Licensure   [place] License Number  [place] [employer] [address] OTR/L [id number]

## 2022-03-29 NOTE — PLAN OF CARE
Problem: MOBILITY - ADULT  Goal: Maintain or return to baseline ADL function  Description: INTERVENTIONS:  -  Assess patient's ability to carry out ADLs; assess patient's baseline for ADL function and identify physical deficits which impact ability to perform ADLs (bathing, care of mouth/teeth, toileting, grooming, dressing, etc )  - Assess/evaluate cause of self-care deficits   - Assess range of motion  - Assess patient's mobility; develop plan if impaired  - Assess patient's need for assistive devices and provide as appropriate  - Encourage maximum independence but intervene and supervise when necessary  - Involve family in performance of ADLs  - Assess for home care needs following discharge   - Consider OT consult to assist with ADL evaluation and planning for discharge  - Provide patient education as appropriate  Outcome: Progressing  Goal: Maintains/Returns to pre admission functional level  Description: INTERVENTIONS:  - Perform BMAT or MOVE assessment daily    - Set and communicate daily mobility goal to care team and patient/family/caregiver  - Collaborate with rehabilitation services on mobility goals if consulted  - Perform Range of Motion 3 times a day  - Reposition patient every 3  hours    - Dangle patient 2 times a day  - Stand patient 3 times a day  - Ambulate patient 3 times a day  - Out of bed to chair 4 times a day   - Out of bed for meals 3  times a day  - Out of bed for toileting  - Record patient progress and toleration of activity level   Outcome: Progressing

## 2022-03-29 NOTE — DISCHARGE INSTRUCTIONS
Total Knee Replacement     WHAT YOU SHOULD KNOW:   Total knee replacement is surgery to replace a knee joint damaged by wear, injury, or disease  It is also called a total knee arthroplasty  The knee joint is where your femur (thigh bone) and tibia (large lower leg bone or shin bone) meet  A small bone called the patella (kneecap) protects your knee joint  AFTER YOU LEAVE:     Medicines:   · Pain medicine: You may be given a prescription medicine to decrease pain  Do not wait until the pain is severe before you take this medicine  We recommend that you take Tylenol 975mg every 8 hours for baseline pain control  Oxycodone can be used as needed, but no more than 1/2-1 tablets every 4 to 6 hours  · Stool softeners  make it easier for you to have a bowel movement  You may need this medicine to treat or prevent constipation  You will be given Colace 100mg to take twice a day    · Anticoagulants  are a type of blood thinner medicine that helps prevent clots  Clots can cause strokes, heart attacks, and death  These medicines may cause you to bleed or bruise more easily  You will be given Lovenox 40 mg to inject under the skin daily the 1st 5 days after surgery  You may restart your home Coumadin on postoperative day 5, 4/2/22, but should continue with Lovenox until your INR is therapeutic  Please go for a recheck of your INR on 4/4/22    ¨ Watch for bleeding from your gums or nose  Watch for blood in your urine and bowel movements  Use a soft washcloth and a soft toothbrush  If you shave, use an electric razor  Avoid activities that can cause bruising or bleeding  · Take your medicine as directed  Call your healthcare provider if you think your medicine is not helping or if you have side effects  Tell him if you are allergic to any medicine  Keep a list of the medicines, vitamins, and herbs you take  Include the amounts, and when and why you take them   Bring the list or the pill bottles to follow-up visits  Carry your medicine list with you in case of an emergency  Follow up with your orthopedist as directed: You may need to return to have your wound checked and stitches, staples, or drain removed  Write down your questions so you remember to ask them during your visits  Please make an appointment to see Dr Ramiro Jacobson 2 weeks postoperatively  Physical therapy:  A physical therapist teaches you exercises to help improve movement and strength, and to decrease pain  You will begin outpatient physical therapy later this week as planned  Self-care:   · Wound Care:  Please leave the Mepilex dressing in place for 7 days after surgery  After 7 days, you may remove the dressing and leave the incision open to air  Do not get the dressing or the incision wet until your seen in the office  If the incision is uncomfortable under clothing after the Mepilex is removed, you may cover it with a a dry sterile dressing, but should remain dry at all times  · Use ice:  Ice helps decrease swelling and pain  Ice may also help prevent tissue damage  Use an ice pack or put crushed ice in a plastic bag  Cover it with a towel, and place it on your knee for 15 to 20 minutes every hour as directed  · Wear pressure stockings: These are long, tight stockings that put pressure on your legs to promote blood flow and prevent clots  You may remove the stocking on your non-operative leg when you get home  The stocking on your operative leg should remain on for 2-3 days  Afterwards, you may put the stocking on or off as needed for swelling  · Use a cane, walker, or crutches as directed: These devices will help decrease your risk of falling  Your therapist will guide you about transitioning from a walker to cane to no assistive device  Contact your orthopedist if:  · You have a fever over 101 0°F     · You have trouble moving or bending your joint      · You have increased pain and swelling in your joint, even after you take pain medicine  · You have back pain or lower leg pain when you bend your foot upwards  · You have questions or concerns about your condition or care  Seek immediate care or call 911 if:   · You feel like you are going to faint  · Blood soaks through/saturates your bandage  · Your incision comes apart  · Your incision is red, swollen, or draining pus  · You cannot walk or move your joint, or the limb is numb  · Your arm or leg feels warm, tender, and painful  It may look swollen and red  · You have a seizure or feel confused  · You feel lightheaded, short of breath, and have chest pain  · You have chest pain when you take a deep breath or cough  You may cough up blood  © 2014 [address] is for End User's use only and may not be sold, redistributed or otherwise used for commercial purposes  All illustrations and images included in CareNotes® are the copyrighted property of A D [employer] M [employer] Inc  or Patrick Coppola  The above information is an  only  It is not intended as medical advice for individual conditions or treatments  Talk to your doctor, nurse or pharmacist before following any medical regimen to see if it is safe and effective for you

## 2022-03-29 NOTE — PROGRESS NOTES
Progress Note - Orthopedics   Classgiacomo Camarillo 80 y o  female MRN: [id number]  Unit/Bed#: 2 South 202 Encounter: [id number]    Assessment:  1) POD#1 s/p Robotic Assisted Right TKA    Plan:  Vanco 1 5g IV x 1 for 24 hours postop - completed  DVT prophylaxis:  Lovenox 40 mg subQ q day/SCD's/Ambulation-will utilize Lovenox bridge back to Coumadin  Will restart Coumadin when risk of postoperative hematoma declines  PT/OT- WBAT RLE  Analgesia PRN  Follow up AM labs - stable, DC IVF, no hematoma  Ledesma DC - voiding appropriately  Dressing- monitor for drainage, Mepilex may remain in place for 7 days  Discharge planning - disposition pending progress with PT postoperatively  Plan for discharge home today to start outpatient physical therapy later this week    Weight bearing: WBAT RLE    VTE Pharmacologic Prophylaxis: Enoxaparin (Lovenox)  VTE Mechanical Prophylaxis: sequential compression device    Subjective:  Patient seen and examined this afternoon  Pain controlled  No overnight events  Able to work with physical and occupational therapy this morning and this afternoon  Denies parasthesias    Vitals: Blood pressure 113/67, pulse 69, temperature (!) 97 °F (36 1 °C), resp  rate 16, height 5' 4 5" (1 638 m), weight 103 kg (227 lb 12 8 oz), SpO2 94 %  ,Body mass index is 38 5 kg/m²        Intake/Output Summary (Last 24 hours) at 3/29/2022 1358  Last data filed at 3/29/2022 4425  Gross per 24 hour   Intake --   Output 2000 ml   Net -2000 ml       Invasive Devices  Report    Peripheral Intravenous Line            Peripheral IV 03/28/22 Right Antecubital 1 day          Drain            Urethral Catheter Non-latex 16 Fr  1 day                Physical Exam: NAD, A&Ox3, sitting comfortably in chair  Ortho Exam: RLE:  Right anterior knee Dsg c/d/i, compartments soft, calf non-tender, +PF/DF/EHL, +DP/SP/Saph/Sural SILT, DP 2+, foot warm, TEDs in place    Lab, Imaging and other studies: PO XR R knee:  Reviewed and acceptable    Lab Results   Component Value Date    WBC 12 67 (H) 03/29/2022    HGB 11 9 03/29/2022    HCT 39 8 03/29/2022     (H) 03/29/2022     03/29/2022     Lab Results   Component Value Date     11/09/2015    SODIUM 136 03/29/2022    K 5 2 03/29/2022     03/29/2022    CO2 25 03/29/2022    ANIONGAP 11 9 11/09/2015    AGAP 8 03/29/2022    BUN 22 03/29/2022    CREATININE 1 02 03/29/2022    GLUC 169 (H) 03/29/2022    GLUF 95 02/15/2022    CALCIUM 8 4 03/29/2022    AST 48 (H) 02/24/2022    ALT 34 02/24/2022    ALKPHOS 61 02/24/2022    PROT 7 2 11/09/2015    TP 8 1 02/24/2022    BILITOT 0 5 11/09/2015    TBILI 0 73 02/24/2022    EGFR 52 03/29/2022     Vicky Murillo PA-C

## 2022-03-31 ENCOUNTER — OFFICE VISIT (OUTPATIENT)
Dept: PHYSICAL THERAPY | Facility: CLINIC | Age: 82
End: 2022-03-31
Payer: MEDICARE

## 2022-03-31 ENCOUNTER — TELEPHONE (OUTPATIENT)
Dept: OBGYN CLINIC | Facility: HOSPITAL | Age: 82
End: 2022-03-31

## 2022-03-31 ENCOUNTER — EPISODE CHANGES (OUTPATIENT)
Dept: CASE MANAGEMENT | Facility: OTHER | Age: 82
End: 2022-03-31

## 2022-03-31 DIAGNOSIS — M25.561 CHRONIC PAIN OF RIGHT KNEE: ICD-10-CM

## 2022-03-31 DIAGNOSIS — G89.29 CHRONIC PAIN OF RIGHT KNEE: ICD-10-CM

## 2022-03-31 DIAGNOSIS — M17.11 PRIMARY OSTEOARTHRITIS OF RIGHT KNEE: Primary | ICD-10-CM

## 2022-03-31 PROCEDURE — 97110 THERAPEUTIC EXERCISES: CPT

## 2022-03-31 PROCEDURE — 97530 THERAPEUTIC ACTIVITIES: CPT

## 2022-03-31 NOTE — TELEPHONE ENCOUNTER
Patient was contacted this morning to complete a postoperative follow-up call assessment  she reports doing well, current 6/10 pain since she just got out of bed  She reports I normally am not in pain    She is ambulating with a rolling walker, and reports not swollen at all    We did discuss her icing areas of pain and swelling, and that swelling can present after increased activity especially in the 1st 7-10 days  She reports her dressing is dry and denies any drainage  We reviewed her after visit summary medication list   She reports taking Tylenol, 975 mg 3 times a day and oxycodone 5 mg but stretching for pain  She denies taking it every 4-6 hours  She confirmed also taking the Lovenox injections daily, and is taking Colace, 100 mg twice a day, but denies having a bowel movement  We did discuss increasing her fluid and fiber intake to also assist     She denies any chest pain, shortness of breath, dizziness, fever, or calf pain  She also denies any questions or issues at this time  Patient was encouraged to contact our team if any would arise at any point  No further action required at this time, patient is ready for CM handoff  Dictation was used for this assessment, any errors may be related to using the dictation device

## 2022-03-31 NOTE — PROGRESS NOTES
PT Evaluation   Today's date: 3/31/2022  Patient name: Selene Cruz  : 1940  MRN: [id number]  Referring provider: Dong Burkett DO  Dx:   Encounter Diagnosis     ICD-10-CM    1  Primary osteoarthritis of right knee  M17 11    2  Chronic pain of right knee  M25 561     G89 29                Assessment  Assessment details: Patient is a 80 y o  Female who presents to skilled outpatient PT s/p right total knee replacement performed on 3/28/2022  Referring diagnoses include primary osteoarthritis of right knee and chronic pain of right knee  Primary movement impairment diagnosis of hypomobility resulting in pathoanatomical symptoms of pain, restricted range of motion, muscular power/coordionation deficits, ambulatory dysfunction, and functional limitations  The aforementioned impairments have limited the patient's ability to ambulate at [facility], negotiate stairs, and perform functional transfers w/o assistance  No further referral is necessary at this time based upon examination results  Patient education performed during today's session included: Signs and symptoms of infection (including redness, warmth, drainage, swelling), Importance of keeping incision dry, per MD order and HEP (consistng of ankle pumps, glute sets, quad sets, and heel slides)  Impairments: Abnormal coordination, Abnormal gait, Abnormal muscle tone, Abnormal or restricted ROM, Activity intolerance, Impaired balance, Impaired physical strength, Lacks appropriate HEP, Poor body mechanics, Pain with function, Weight-bearing intolerance, Abnormal movement and Abnormal muscle firing  Understanding of Dx/Px/POC: Good  Prognosis: Good    Patient verbalized understanding of POC  Please contact me if you have any questions or recommendations  Thank you for the referral and the opportunity to share in Keily Ortiz's care          Plan  Patient would benefit from: PT Eval and Skilled PT  Planned modality interventions: Biofeedback, Cryotherapy, TENS, Thermotherapy: Hydrocollator Packs and Traction  Planned therapy interventions: Abdominal trunk stabilization, ADL training, Balance/WB training, Body mechanics training, Coordination, Functional ROM exercises, Gait training, HEP, Joint mobilization, Manual therapy, Balderas taping, Motor coordination training, Neuromuscular re-education, Patient education, Strengthening, Stretching, Therapeutic activities, Therapeutic exercises, Activity modification and Work reintegration  Frequency: 3x/wk  Duration in weeks: 8  Plan of Care beginning date: 3-  Plan of Care expiration date: 8 weeks - 5/26/2022  Treatment plan discussed with: Patient       Goals  Short Term Goals (4 weeks):    - Patient will be independent in basic HEP 2-3 weeks  - Patient will demonstrate >100 degrees of knee ROM for reciprocal stair negotiation  - Patient will have 0/10 pain at rest  - Patient will demonstrate >1/3 improvement in MMT grade as applicable     Long Term Goals (8 weeks):  - Patient will be independent in a comprehensive home exercise program  - Patient FOTO score will improve to at least 10 points higher than post op score  - Patient will ambulation with AD PRN  - Patient will independently ambulate >1000 feet (community ambulation)  - Patient will self-report >75% improvement in function  Subjective    History of Present Illness  - Mechanism of injury: Patient had R TKR performed 3/28/22  She has first floor set up she has been using, including pull out couch  She states that she has required assistance with donning/doffing socks/shoes/pants, getting into bed  She states that she can independently get out of bed  Patient states that she forgot to take her pain medication prior to today's session  She states that she has been compliant with her other medication, including injections for blood clots      - Primary AD: rolling walker  - Assist level at home: moderate assistance from   - Prior level of function: independent ambulation w/o AD      Pain  - Current pain ratin/10  - At best pain ratin/10  - At worst pain ratin/10  - Location: right knee    Social Support  - Steps to enter house: 2-3  - Stairs in house: 13  - Bedroom/bathroom set up: normal   - DME available: walker   - Lives in: 2 story home - can stay on first floor   - Lives with: spouse       Objective       LE MMT  - L Hip Flexion: 4+/5   R Hip Flexion: 3/5  - L Hip Extension: 4+/5  R Hip Extension: 4-/5  - L Hip Abduction: 4+/5  R Hip Abduction: 2+/5  - L Hip Adduction: 4/5   R Hip Adduction: 4/5    - L Knee Extension: 4+/5  R Knee Extension: 2+/5  - L Knee Flexion: 4+/5   R Knee Flexion: 3+/5    LE ROM  - L Knee Flexion: WFL degrees   R Knee Flexion: 88 degrees  - L Knee Extension: WFL degrees  R Knee Extension: -3 degrees    Skin Check  - Incision covered with post-operative dressing  - Patient with significant bruising over anterior aspect of right knee  - Drainage at anterior knee well-confined to post-operative dressing  - Photo of right knee uploaded to Media section of patient's chart; surgeon's office contacted, as noted above    Knee Comments  - Gait Assessment: ambulating with RW walking, mild step through pattern, decreased step length and gait speed  - TU 54 seconds with rolling walker; significant use of hands to stand                Eval/ Re-eval Auth #/ Referral # Total visits Start date  Expiration date Total active units  Total manual units  PT only or  PT+OT?   3-21-22 (pre-op) NA NA NA NA NA NA NA   3-31-22 (post-op)                                              Precautions: B diabetic neuropathy, DM II, L TKA 2020    Past Medical History:   Diagnosis Date    [facility]     Carrero's esophagus     last assessed: 2018    BRCA1 negative     BRCA2 negative     [facility] 2006    stage 1 (left), given adjuvant radiation with Arimidex x 5 years    Cancer [facility]) 2006    Left Breast, Lumpectomy    Cataract     last assessed: 3/11/2014    Dysplasia of toenail     last assessed: 8/29/2017    Esophageal reflux     GERD (gastroesophageal reflux disease)     Gross hematuria     last assessed: 2/19/2015    Hematuria     Hiatal hernia     History of radiation therapy     Hypertension     Irregular heart beat     AFIB    Mixed sensory-motor polyneuropathy     Neuropathy     Obesity     Pacemaker     Paroxysmal atrial fibrillation (HCC)     Peripheral neuropathy     Rectal bleeding     Restless leg syndrome     Shortness of breath     last assessed: 1/11/2016         Date 3/21/2022  3/31/22       Visit Number IE 2 (post-op)       ROM         Knee Flexion Tested  88       Knee Extension tested -3                Manual         Patellar mobs         STM                           TherEx         Knee/Hip PROM x2-3 mins  5'       Active w/u         [employer], glute set x10-15  rev       Ankle pumps rev rev       SLR x20        Hip abduction         Knee ext: SAQ, LAQ         Heel slides  rev                                                             TherAct         Patient education x5-6 mins for POC and HEP rev  HEP, PT POC, pt edu, skin check, 20'       Stair negotiation         Car transfer                           Gait Training                                    Modalities         CP  10' post

## 2022-04-01 ENCOUNTER — TELEPHONE (OUTPATIENT)
Dept: OBGYN CLINIC | Facility: CLINIC | Age: 82
End: 2022-04-01

## 2022-04-01 NOTE — TELEPHONE ENCOUNTER
Received a fax from pharmacy regarding oxycodone "script clarification"        pharmacy issues resolved

## 2022-04-04 ENCOUNTER — TELEPHONE (OUTPATIENT)
Dept: OBGYN CLINIC | Facility: CLINIC | Age: 82
End: 2022-04-04

## 2022-04-04 NOTE — TELEPHONE ENCOUNTER
Patient contacted office regarding her right knee/leg lvm on clinical line  Patient c/o warmth, briusing and swelling  She is also stated that when she lays in the recline her leg seems to be ok, but when she lays down in bed she has a lot of pain, she is wondering why this is?

## 2022-04-04 NOTE — TELEPHONE ENCOUNTER
Yes, she is on fairly aggressive blood thinners with Lovenox and Coumadin, so she is going to swell and bruise more than most postoperative patients  We recommend continuing elevating the lower extremity and icing to help reduce edema    Thank you

## 2022-04-05 ENCOUNTER — OFFICE VISIT (OUTPATIENT)
Dept: PHYSICAL THERAPY | Facility: CLINIC | Age: 82
End: 2022-04-05
Payer: MEDICARE

## 2022-04-05 ENCOUNTER — PATIENT OUTREACH (OUTPATIENT)
Dept: INTERNAL MEDICINE CLINIC | Facility: CLINIC | Age: 82
End: 2022-04-05

## 2022-04-05 ENCOUNTER — TELEPHONE (OUTPATIENT)
Dept: OBGYN CLINIC | Facility: CLINIC | Age: 82
End: 2022-04-05

## 2022-04-05 DIAGNOSIS — Z96.651 STATUS POST TOTAL KNEE REPLACEMENT USING CEMENT, RIGHT: Primary | ICD-10-CM

## 2022-04-05 DIAGNOSIS — G89.29 CHRONIC PAIN OF RIGHT KNEE: ICD-10-CM

## 2022-04-05 DIAGNOSIS — M25.561 CHRONIC PAIN OF RIGHT KNEE: ICD-10-CM

## 2022-04-05 DIAGNOSIS — M17.11 PRIMARY OSTEOARTHRITIS OF RIGHT KNEE: Primary | ICD-10-CM

## 2022-04-05 PROCEDURE — 97110 THERAPEUTIC EXERCISES: CPT

## 2022-04-05 PROCEDURE — 97530 THERAPEUTIC ACTIVITIES: CPT

## 2022-04-05 RX ORDER — OXYCODONE HYDROCHLORIDE 5 MG/1
TABLET ORAL
Qty: 30 TABLET | Refills: 0 | Status: SHIPPED | OUTPATIENT
Start: 2022-04-05 | End: 2022-04-14 | Stop reason: HOSPADM

## 2022-04-05 NOTE — TELEPHONE ENCOUNTER
PATIENT CONTACT OFFICE AND LVM  Patient called in concerned  Patient stated that she was supposed to restart her coumadin on 04/02 and check her IRN 04/04  It is now the 5th  She is unsure what to do  Patient is on schedule for wound check tomorrow (see image in PT note)

## 2022-04-05 NOTE — PROGRESS NOTES
Daily Note     Today's date: 2022  Patient name: Norma Perez  : 1940  MRN: [id number]  Referring provider: Puma Dias DO  Dx:   Encounter Diagnosis     ICD-10-CM    1  Primary osteoarthritis of right knee  M17 11    2  Chronic pain of right knee  M25 561     G89 29        Start Time: 1015  Stop Time: 1055  Total time in clinic (min): 40 minutes    Subjective: Patient reports 8/10 pain at the start of today's session  She states that she has not been able to move her knee over the last two days due to significant pain  "Every time I try to do my exercises and bend my knee it makes it so much worse "       Objective: See treatment diary below    Skin check performed during today's session:  - Post-operative dressing peeling at superior aspect of knee; when lifted, odor noted  - Patient with significant bruising throughout R LE  - Increased drainage noted on post-operative dressing compared to previous session, although still contained by dressing    - Surgeon's office notified of changes in skin/post-operative dressing  - Picture of patient's knee uploaded to Media section of patient's chart  - Previously peeling superior aspect of post-op dressing covered with gauze and surgical tape during today's session  Assessment: Due to concern in regards to draining at post-operative incision and significant discoloration of right lower extremity, surgeon's office was contacted during today's session  Patient has scheduled skin check for tomorrow, 2022  Patient heavily educated on importance of keeping incision dry until follow up, verbalized good understanding  Patient with significant reduction in right knee pain during PROM, although pain quickly returned with sustained extension at rest  Reviewed HEP, including importance of AROM for both flexion and extension   Patient expressed concern with performing heelslides in supine due to significant pain, reviewed for sitting position, pt agreeable  Plan to follow up tomorrow for passive range after skin check  Pt will continue to benefit from skilled PT to improve functional mobility and activity tolerance  Plan: Increase frequency to 3x/week due to ROM restriction and irritability of symptoms           Eval/ Re-eval Auth #/ Referral # Total visits Start date  Expiration date Total active units  Total manual units  PT only or  PT+OT?   3-21-22 (pre-op) NA NA NA NA NA NA NA   3-31-22 (post-op)                                              Precautions: B diabetic neuropathy, DM II, L TKA August 2020    Past Medical History:   Diagnosis Date    [facility]     Carrero's esophagus     last assessed: 1/23/2018    BRCA1 negative     BRCA2 negative     [facility]    stage 1 (left), given adjuvant radiation with Arimidex x 5 years    Cancer [facility]) 2006    Left Breast, Lumpectomy    Cataract     last assessed: 3/11/2014    Dysplasia of toenail     last assessed: 8/29/2017    Esophageal reflux     GERD (gastroesophageal reflux disease)     Gross hematuria     last assessed: 2/19/2015    Hematuria     Hiatal hernia     History of radiation therapy     Hypertension     Irregular heart beat     AFIB    Mixed sensory-motor polyneuropathy     Neuropathy     Obesity     Pacemaker     Paroxysmal atrial fibrillation (HCC)     Peripheral neuropathy     Rectal bleeding     Restless leg syndrome     Shortness of breath     last assessed: 1/11/2016         Date 3/21/2022  3/31/22 4/5/22      Visit Number IE 2 (post-op) 3      ROM         Knee Flexion Tested  88 72      Knee Extension tested -3 -3               Manual         Patellar mobs         STM                           TherEx         Knee/Hip PROM x2-3 mins  5' 10'      Active w/u         Quad set, glute set x10-15  rev rev      Ankle pumps rev rev rev      SLR x20        Hip abduction         Knee ext: SAQ, LAQ         Heel slides  rev rev TherAct         Patient education x5-6 mins for POC and HEP rev  HEP, PT POC, pt edu, skin check, 20' Skin check, pt edu, HEP, PT POC x30'      Stair negotiation         Car transfer                           Gait Training                                    Modalities         CP  10' post

## 2022-04-05 NOTE — PROGRESS NOTES
Tamara notification received for new Medicare  Bundle patient  Chart review completed  Outside records through Sujatha requested and refreshed  Patient hospitalized   ELIZA Barillas-3/28/22-3/29/22    Past Medical History  See problem list for complete list of medical diagnosis  Call placed to patient's cell phone  LM on VM  This CM introduced self and explained Medicare Bundle program and Care Management services  This CM provided contact number and request for return call

## 2022-04-05 NOTE — TELEPHONE ENCOUNTER
Discussed with Lisandra Bauman patient is to start her coumadin and we will order her INR and have her go to the lab here after her ov

## 2022-04-06 ENCOUNTER — OFFICE VISIT (OUTPATIENT)
Dept: OBGYN CLINIC | Facility: CLINIC | Age: 82
End: 2022-04-06

## 2022-04-06 ENCOUNTER — APPOINTMENT (OUTPATIENT)
Dept: PHYSICAL THERAPY | Facility: CLINIC | Age: 82
End: 2022-04-06
Payer: MEDICARE

## 2022-04-06 VITALS
SYSTOLIC BLOOD PRESSURE: 104 MMHG | BODY MASS INDEX: 38.36 KG/M2 | HEART RATE: 90 BPM | DIASTOLIC BLOOD PRESSURE: 70 MMHG | WEIGHT: 227 LBS

## 2022-04-06 DIAGNOSIS — M25.561 CHRONIC PAIN OF RIGHT KNEE: ICD-10-CM

## 2022-04-06 DIAGNOSIS — Z96.651 AFTERCARE FOLLOWING RIGHT KNEE JOINT REPLACEMENT SURGERY: ICD-10-CM

## 2022-04-06 DIAGNOSIS — Z47.1 AFTERCARE FOLLOWING RIGHT KNEE JOINT REPLACEMENT SURGERY: ICD-10-CM

## 2022-04-06 DIAGNOSIS — M79.89 LEG SWELLING: ICD-10-CM

## 2022-04-06 DIAGNOSIS — G89.29 CHRONIC PAIN OF RIGHT KNEE: ICD-10-CM

## 2022-04-06 DIAGNOSIS — Z96.651 STATUS POST TOTAL KNEE REPLACEMENT USING CEMENT, RIGHT: Primary | ICD-10-CM

## 2022-04-06 PROBLEM — M17.11 PRIMARY OSTEOARTHRITIS OF RIGHT KNEE: Status: RESOLVED | Noted: 2022-03-02 | Resolved: 2022-04-06

## 2022-04-06 PROCEDURE — 99024 POSTOP FOLLOW-UP VISIT: CPT | Performed by: ORTHOPAEDIC SURGERY

## 2022-04-06 NOTE — PROGRESS NOTES
Assessment/Plan:  1  Status post total knee replacement using cement, right     2  Chronic pain of right knee     3  Aftercare following right knee joint replacement surgery     4  Leg swelling       Sushil Caputo is a pleasant 82-year-old female presenting today for interval evaluation 1 week after undergoing a robotic assisted right total knee arthroplasty  Prosthesis does seem stable on exam today  The incision does not demonstrate any sign of infection or dehiscence, but we did place another waterproof Mepilex dressing to wear for the next week as she does have some drainage from the distal end of the incision  At this time, it is unclear why she has significant edema and ecchymosis of the lower extremity  However, her INR was elevated on the day of surgery and Lovenox was started within 12 hours, so is likely this is contributing to the bruising and edema  We encouraged her to continue with oxycodone and Tylenol as needed for pain  She is to restart her Coumadin today and recheck her INR on Friday  We would like her to hold physical therapy for the rest of the week while she elevates her toes above the level of her nose and ices her right lower extremity to help reduce some of the edema  We will plan to see her back in 1 week with x-rays on arrival of her right knee  She is welcome to call with any questions or concerns in the interim  She expressed understanding all of her questions were addressed    Subjective: Interval check 1 week status post robotic assisted right total knee arthroplasty    Patient ID: Katie Sher is a 80 y o  female  Sushil Caputo is a pleasant 82-year-old female presenting today 1 week after a robotic assisted right total knee arthroplasty  She reports she was doing well up until Saturday, when she noticed a significant increase in the swelling, bruising, and pain in her right leg    She has been extremely limited in her mobility and ability to perform home exercises due to this pain   She is still taking the Lovenox, but has not yet restarted Coumadin as she forgot  She has been very limited in her ability participate with therapy this week  She did notice some drainage on the Mepilex dressing as well, but states that she has not gotten it wet    Review of Systems   Constitutional: Positive for activity change  HENT: Negative  Eyes: Negative  Respiratory: Negative  Cardiovascular: Positive for leg swelling  Gastrointestinal: Negative  Endocrine: Negative  Genitourinary: Negative  Musculoskeletal: Positive for arthralgias, gait problem, joint swelling and myalgias  Skin: Positive for color change  Allergic/Immunologic: Negative  Hematological: Negative  Psychiatric/Behavioral: Negative        Past Medical History:   Diagnosis Date    Arthritis     Atrial fibrillation ([facility] [address] [facility]     Carrero's esophagus     last assessed: 1/23/2018    BRCA1 negative     BRCA2 negative     Breast cancer ([facility] ) 2006    stage 1 (left), given adjuvant radiation with Arimidex x 5 years    Cancer [facility]) 2006    Left Breast, Lumpectomy    Cataract     last assessed: 3/11/2014    Dysplasia of toenail     last assessed: 8/29/2017    Esophageal reflux     GERD (gastroesophageal reflux disease)     Gross hematuria     last assessed: 2/19/2015    Hematuria     Hiatal hernia     History of radiation therapy     Hypertension     Irregular heart beat     AFIB    Mixed sensory-motor polyneuropathy     Neuropathy     Obesity     Pacemaker     Paroxysmal atrial fibrillation (HCC)     Peripheral neuropathy     Rectal bleeding     Restless leg syndrome     Shortness of breath     last assessed: 1/11/2016     Past Surgical History:   Procedure Laterality Date    BREAST BIOPSY Left 2006    BREAST LUMPECTOMY Left 2006    onset: 2006    BREAST SURGERY      CARDIAC PACEMAKER PLACEMENT      x 3 2006    CATARACT EXTRACTION      COLONOSCOPY      CYSTOSCOPY  04/04/2014 diagnostic    HYSTERECTOMY      ALISON BSO; due to fibroid uterus; age 38   Tanika Munguia KNEE CARTILAGE SURGERY      excision lesion of meniscus or capsule knee    KNEE SURGERY      OOPHORECTOMY Bilateral     OTHER SURGICAL HISTORY      radiation therapy    [place] COLONOSCOPY FLX DX W/COLLJ SPEC WHEN PFRMD N/A 2017    Procedure: COLONOSCOPY;  Surgeon: Keyona Jiang MD;  [facility]; Service: Gastroenterology    [place] ESOPHAGOGASTRODUODENOSCOPY TRANSORAL DIAGNOSTIC N/A 2017    Procedure: ESOPHAGOGASTRODUODENOSCOPY (EGD); Surgeon: Traci Benjamin MD;  [facility];   Service: Gastroenterology    [place] TOTAL KNEE ARTHROPLASTY Left 2020    Procedure: ARTHROPLASTY KNEE TOTAL;  Surgeon: Gaurav Dela Cruz DO;  Location: [address] [place] [address];  Service: Orthopedics    [place] TOTAL KNEE ARTHROPLASTY Right 3/28/2022    Procedure: ARTHROPLASTY KNEE TOTAL W ROBOT - RIGHT;  Surgeon: Gaurav Dela Cruz DO;  Location: [address] [place] [address];  Service: Orthopedics    UPPER GASTROINTESTINAL ENDOSCOPY       Family History   Problem Relation Age of Onset    Hypertension Mother     Heart disease Father     Aneurysm Father     Coronary artery disease Father         in his 76s with aneurysm    Aortic aneurysm Father         abdominal    Scleroderma Sister     Breast cancer Sister 76    Hypertension Sister     Cancer Sister     No Known Problems Son     No Known Problems Son     Testicular cancer Son 39    Thyroid cancer Son 45    Colon cancer Maternal Aunt     Colon cancer Maternal Aunt     Breast cancer Other 48        kaylee's daughter    Alcohol abuse Neg Hx     Substance Abuse Neg Hx     Mental illness Neg Hx     Depression Neg Hx      Social History     Occupational History    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      Comment: retired   Tobacco Use    Smoking status: Former Smoker     Packs/day: 1 00     Years: 25 00     Pack years: 25 00     Types: Cigarettes     Quit date: 1980     Years since quittin 5  Smokeless tobacco: Never Used    Tobacco comment: Quit over 30 years ago; quit age 41   Vaping Use    Vaping Use: Never used   Substance and Sexual Activity    Alcohol use: Not Currently    Drug use: No    Sexual activity: Not on file       Current Outpatient Medications:     acetaminophen (TYLENOL) 325 mg tablet, Take 3 tablets (975 mg total) by mouth every 8 (eight) hours, Disp: , Rfl: 0    Calcium Acetate, Phos Binder, (CALCIUM ACETATE PO), Take by mouth daily  , Disp: , Rfl:     clobetasol (TEMOVATE) 0 05 % ointment, Apply once weekly to the affected area, Disp: 30 g, Rfl: 3    docusate sodium (COLACE) 100 mg capsule, Take 1 capsule (100 mg total) by mouth 2 (two) times a day, Disp: 60 capsule, Rfl: 0    enoxaparin (LOVENOX) 40 mg/0 4 mL, Inject 0 4 mL (40 mg total) under the skin daily for 10 days, Disp: 4 mL, Rfl: 0    famotidine (PEPCID) 40 MG tablet, TAKE 1 TABLET BY MOUTH EVERY DAY (Patient taking differently: daily at bedtime as needed  ), Disp: 90 tablet, Rfl: 1    Flovent  MCG/ACT inhaler, INHALE 2 PUFFS 2 (TWO) TIMES A DAY RINSE MOUTH AFTER USE , Disp: 12 g, Rfl: 1    fluticasone (FLONASE) 50 mcg/act nasal spray, SPRAY 1 SPRAY INTO EACH NOSTRIL EVERY DAY, Disp: 16 mL, Rfl: 1    furosemide (LASIX) 40 mg tablet, TAKE 1 TABLET BY MOUTH EVERY DAY, Disp: 90 tablet, Rfl: 3    gabapentin (NEURONTIN) 800 mg tablet, Take 1 tablet (800 mg total) by mouth 4 (four) times a day, Disp: 360 tablet, Rfl: 1    lisinopril (ZESTRIL) 2 5 mg tablet, TAKE 1 TABLET BY MOUTH EVERY DAY, Disp: 90 tablet, Rfl: 0    loratadine (CLARITIN) 10 mg tablet, Take 10 mg by mouth daily, Disp: , Rfl:     magnesium 30 MG tablet, Take 30 mg by mouth 2 (two) times a day, Disp: , Rfl:     metoprolol tartrate (LOPRESSOR) 50 mg tablet, Take 1 5 tablets (75 mg total) by mouth 2 (two) times a day, Disp: 180 tablet, Rfl: 3    multivitamin (THERAGRAN) TABS, Take 1 tablet by mouth daily, Disp: , Rfl:     oxyCODONE (Roxicodone) 5 immediate release tablet, Take 1/2 to 1 tablets by mouth every 4-6 hours as needed for pain, Disp: 30 tablet, Rfl: 0    pramipexole (MIRAPEX) 0 5 mg tablet, 2 FULL TABLETS TWICE A DAY AT 5:00 P  M  AND 10:00 P M , Disp: 360 tablet, Rfl: 1    warfarin (COUMADIN) 7 5 mg tablet, TAKE 1 TABLET BY MOUTH EVERY DAY (Patient taking differently: Last dose 3/22/22 ), Disp: 90 tablet, Rfl: 3    Allergies   Allergen Reactions    Latex      Added based on information entered during case entry, please review and add reactions, type, and severity as needed    Cephalexin Rash    Duloxetine Hcl Other (See Comments)     Facial pins and needles sensation    Erythromycin Rash    Levofloxacin Other (See Comments)     Muscular aches    Penicillins Rash    Savella [Milnacipran] Rash    Sulfa Antibiotics Rash       Objective:  Vitals:    04/06/22 0944   BP: 104/70   Pulse: 90       Body mass index is 38 36 kg/m²  Right Knee Exam     Muscle Strength   The patient has normal right knee strength  Tenderness   Right knee tenderness location: global tenderness  Range of Motion   Extension:  5 abnormal   Flexion:  100 abnormal     Tests   Varus: negative Valgus: negative  Drawer:  Anterior - negative        Other   Erythema: present  Scars: present  Pulse: present  Swelling: moderate  Effusion: no effusion present    Comments:  Moderate to severe edema and ecchymosis from thigh to right ankle  Global tenderness in this area  Anterior incision well approximated without signs dehiscence, erythema, warmth  There is mild sanguinous drainage from the distal end of the incision, but nothing able to be expressed  No obvious soft tissue palpable hematoma  Ambulates with a severely antalgic gait with use of a walker  Prosthesis stable to stressing of the available range of motion  Extensor mechanism intact          Observations     Right Knee   Negative for effusion  Physical Exam  Vitals and nursing note reviewed  Constitutional:       Appearance: She is well-developed  Comments: Body mass index is 38 36 kg/m²  HENT:      Head: Normocephalic and atraumatic  Right Ear: External ear normal       Left Ear: External ear normal    Cardiovascular:      Rate and Rhythm: Normal rate  Pulses: Normal pulses  Pulmonary:      Effort: Pulmonary effort is normal    Abdominal:      Palpations: Abdomen is soft  Musculoskeletal:      Cervical back: Normal range of motion  Right knee: No effusion  Comments: See ortho exam   Skin:     General: Skin is warm and dry  Neurological:      General: No focal deficit present  Mental Status: She is alert and oriented to person, place, and time  Mental status is at baseline  Psychiatric:         Behavior: Behavior normal          Thought Content:  Thought content normal          Judgment: Judgment normal

## 2022-04-07 ENCOUNTER — TELEPHONE (OUTPATIENT)
Dept: INTERNAL MEDICINE CLINIC | Facility: CLINIC | Age: 82
End: 2022-04-07

## 2022-04-07 ENCOUNTER — APPOINTMENT (OUTPATIENT)
Dept: PHYSICAL THERAPY | Facility: CLINIC | Age: 82
End: 2022-04-07
Payer: MEDICARE

## 2022-04-07 NOTE — TELEPHONE ENCOUNTER
You can take furosemide twice a day (morning and noon or early afternoon) for the next 3 days only  Keep legs elevated  Agree with keeping compression stocking off for now

## 2022-04-07 NOTE — TELEPHONE ENCOUNTER
Pt  had a knee operation on rt  leg 3/28  Was fine until 4/2-started with pain in leg  Saw the surgeon yesterday for post-op and they changed the dressing  Went to PT on 4/5  Still has fluid in her leg also  Her whole leg was bruised after surgery-surgeon told her it was due to being on Warfarin  Using Enoxaparin Sodium injections-has two left  Also taking Tylenol 325mg 3tabs q 8hrs, Oxycodone HCL (IR) 5mg  1 tab q 4hrs  Wondering if she can take 2 Furosemide tabs daily instead of one to help get rid of the fluid  She was told by surgeon's PA to elevate her feet above her head  They told her to leave compression stockings off to let her leg air out plus it felt uncomfortable with it on

## 2022-04-08 ENCOUNTER — APPOINTMENT (OUTPATIENT)
Dept: LAB | Facility: CLINIC | Age: 82
DRG: 603 | End: 2022-04-08
Payer: MEDICARE

## 2022-04-08 ENCOUNTER — ANTICOAG VISIT (OUTPATIENT)
Dept: CARDIOLOGY CLINIC | Facility: CLINIC | Age: 82
End: 2022-04-08

## 2022-04-08 ENCOUNTER — PATIENT OUTREACH (OUTPATIENT)
Dept: INTERNAL MEDICINE CLINIC | Facility: CLINIC | Age: 82
End: 2022-04-08

## 2022-04-08 ENCOUNTER — TELEPHONE (OUTPATIENT)
Dept: OBGYN CLINIC | Facility: CLINIC | Age: 82
End: 2022-04-08

## 2022-04-08 DIAGNOSIS — Z96.651 STATUS POST TOTAL KNEE REPLACEMENT USING CEMENT, RIGHT: ICD-10-CM

## 2022-04-08 NOTE — TELEPHONE ENCOUNTER
Called the patient and addressed her concerns  Encouraged continued elevation  Pain slightly improved

## 2022-04-08 NOTE — TELEPHONE ENCOUNTER
Patient had Right Knee Arthroplasty on 3/28/22  Leg is still swollen and red  She called her PCP who put her on Lasix 80 mg for 3 days  States helped swelling a little but the leg is still red and she is concerned  She would like a call

## 2022-04-08 NOTE — PROGRESS NOTES
CM contacted patient to introduce self, explain the role of the OP CM and purpose of this phone call is to assess patient's general wellbeing or for any assistance needed with follow-up care, medication costs, or financial difficulty  Explanation given regarding the free Medicare BPCI-A program that allows for added telephonic nursing support for 90 days to help coordinate and manage further care, as needed  In addition, CM explained there would be a one time telephonic assessment to collect a medical and social history intake  CM added that outreach calls would be weekly/ biweekly/ monthly to ensure patient/caregiver is progressing  CM would communicate with other healthcare providers on the patient care team to ensure their care is coordinated  Patient consented to future outreach of CM  Shameka Mock reports she is doing well but for the swelling in her right leg  She reports the Orthopedic surgeon told her to elevate her leg  He told her the discoloration in the leg is due to warfarin  Shameka Mock tells this CM she is eating potassium rich foods such as bananas, raisins, yogurt, and broccoli  She adds she is a bit constipated  This CM encouraged bowel regimen of colace in the am and Miralax at night  Shameka Mock states she is writing down everything this CM has said  Shameka Mock denies S/S infection  She adds her  will take her to get her PT/INR done as well  Shameka Mock requests she needs to use the bathroom and thanks this CM for calling  CM provided contact information and encouraged patient to contact CM as needed

## 2022-04-10 ENCOUNTER — APPOINTMENT (OUTPATIENT)
Dept: RADIOLOGY | Facility: HOSPITAL | Age: 82
DRG: 603 | End: 2022-04-10
Payer: MEDICARE

## 2022-04-10 ENCOUNTER — NURSE TRIAGE (OUTPATIENT)
Dept: OTHER | Facility: OTHER | Age: 82
End: 2022-04-10

## 2022-04-10 ENCOUNTER — HOSPITAL ENCOUNTER (INPATIENT)
Facility: HOSPITAL | Age: 82
LOS: 4 days | Discharge: HOME | DRG: 603 | End: 2022-04-14
Attending: EMERGENCY MEDICINE
Payer: MEDICARE

## 2022-04-10 DIAGNOSIS — Z96.652 STATUS POST TOTAL KNEE REPLACEMENT USING CEMENT, LEFT: ICD-10-CM

## 2022-04-10 DIAGNOSIS — Z96.651 STATUS POST TOTAL KNEE REPLACEMENT USING CEMENT, RIGHT: ICD-10-CM

## 2022-04-10 DIAGNOSIS — L03.115 CELLULITIS OF RIGHT LOWER EXTREMITY: ICD-10-CM

## 2022-04-10 DIAGNOSIS — Z96.651 STATUS POST RIGHT KNEE REPLACEMENT: ICD-10-CM

## 2022-04-10 DIAGNOSIS — R52 INTRACTABLE PAIN: Primary | ICD-10-CM

## 2022-04-10 LAB
2HR DELTA HS TROPONIN: 0 NG/L
ALBUMIN SERPL BCP-MCNC: 3.6 G/DL (ref 3.5–5)
ALP SERPL-CCNC: 63 U/L (ref 46–116)
ALT SERPL W P-5'-P-CCNC: 31 U/L (ref 12–78)
AMORPH URATE CRY URNS QL MICRO: ABNORMAL /HPF
ANION GAP SERPL CALCULATED.3IONS-SCNC: 8 MMOL/L (ref 4–13)
APTT PPP: 38 SECONDS (ref 23–37)
AST SERPL W P-5'-P-CCNC: 23 U/L (ref 5–45)
BACTERIA UR QL AUTO: ABNORMAL /HPF
BASOPHILS # BLD AUTO: 0.04 THOUSANDS/ΜL (ref 0–0.1)
BASOPHILS NFR BLD AUTO: 0 % (ref 0–1)
BILIRUB SERPL-MCNC: 1.13 MG/DL (ref 0.2–1)
BILIRUB UR QL STRIP: NEGATIVE
BUN SERPL-MCNC: 35 MG/DL (ref 5–25)
CALCIUM SERPL-MCNC: 9.1 MG/DL (ref 8.3–10.1)
CARDIAC TROPONIN I PNL SERPL HS: 11 NG/L
CARDIAC TROPONIN I PNL SERPL HS: 11 NG/L
CHLORIDE SERPL-SCNC: 94 MMOL/L (ref 100–108)
CLARITY UR: CLEAR
CO2 SERPL-SCNC: 29 MMOL/L (ref 21–32)
COLOR UR: ABNORMAL
CREAT SERPL-MCNC: 0.97 MG/DL (ref 0.6–1.3)
EOSINOPHIL # BLD AUTO: 0 THOUSAND/ΜL (ref 0–0.61)
EOSINOPHIL NFR BLD AUTO: 0 % (ref 0–6)
ERYTHROCYTE [DISTWIDTH] IN BLOOD BY AUTOMATED COUNT: 14.4 % (ref 11.6–15.1)
GFR SERPL CREATININE-BSD FRML MDRD: 55 ML/MIN/1.73SQ M
GLUCOSE SERPL-MCNC: 102 MG/DL (ref 65–140)
GLUCOSE UR STRIP-MCNC: NEGATIVE MG/DL
HCT VFR BLD AUTO: 36.5 % (ref 34.8–46.1)
HGB BLD-MCNC: 11.9 G/DL (ref 11.5–15.4)
HGB UR QL STRIP.AUTO: NEGATIVE
IMM GRANULOCYTES # BLD AUTO: 0.07 THOUSAND/UL (ref 0–0.2)
IMM GRANULOCYTES NFR BLD AUTO: 1 % (ref 0–2)
INR PPP: 1.47 (ref 0.84–1.19)
KETONES UR STRIP-MCNC: NEGATIVE MG/DL
LACTATE SERPL-SCNC: 1.2 MMOL/L (ref 0.5–2)
LEUKOCYTE ESTERASE UR QL STRIP: ABNORMAL
LYMPHOCYTES # BLD AUTO: 1.61 THOUSANDS/ΜL (ref 0.6–4.47)
LYMPHOCYTES NFR BLD AUTO: 15 % (ref 14–44)
MAGNESIUM SERPL-MCNC: 2.3 MG/DL (ref 1.6–2.6)
MCH RBC QN AUTO: 30.7 PG (ref 26.8–34.3)
MCHC RBC AUTO-ENTMCNC: 32.6 G/DL (ref 31.4–37.4)
MCV RBC AUTO: 94 FL (ref 82–98)
MONOCYTES # BLD AUTO: 0.96 THOUSAND/ΜL (ref 0.17–1.22)
MONOCYTES NFR BLD AUTO: 9 % (ref 4–12)
NEUTROPHILS # BLD AUTO: 7.79 THOUSANDS/ΜL (ref 1.85–7.62)
NEUTS SEG NFR BLD AUTO: 75 % (ref 43–75)
NITRITE UR QL STRIP: NEGATIVE
NON-SQ EPI CELLS URNS QL MICRO: ABNORMAL /HPF
NRBC BLD AUTO-RTO: 0 /100 WBCS
PH UR STRIP.AUTO: 5.5 [PH]
PLATELET # BLD AUTO: 222 THOUSANDS/UL (ref 149–390)
PMV BLD AUTO: 10.1 FL (ref 8.9–12.7)
POTASSIUM SERPL-SCNC: 4.1 MMOL/L (ref 3.5–5.3)
PROT SERPL-MCNC: 7.6 G/DL (ref 6.4–8.2)
PROT UR STRIP-MCNC: NEGATIVE MG/DL
PROTHROMBIN TIME: 17.4 SECONDS (ref 11.6–14.5)
RBC # BLD AUTO: 3.88 MILLION/UL (ref 3.81–5.12)
RBC #/AREA URNS AUTO: ABNORMAL /HPF
SODIUM SERPL-SCNC: 131 MMOL/L (ref 136–145)
SP GR UR STRIP.AUTO: <=1.005 (ref 1–1.03)
UROBILINOGEN UR QL STRIP.AUTO: 0.2 E.U./DL
WBC # BLD AUTO: 10.47 THOUSAND/UL (ref 4.31–10.16)
WBC #/AREA URNS AUTO: ABNORMAL /HPF

## 2022-04-10 PROCEDURE — 73564 X-RAY EXAM KNEE 4 OR MORE: CPT

## 2022-04-10 PROCEDURE — 85610 PROTHROMBIN TIME: CPT | Performed by: EMERGENCY MEDICINE

## 2022-04-10 PROCEDURE — 93005 ELECTROCARDIOGRAM TRACING: CPT

## 2022-04-10 PROCEDURE — 99220 PR INITIAL OBSERVATION CARE/DAY 70 MINUTES: CPT | Performed by: INTERNAL MEDICINE

## 2022-04-10 PROCEDURE — 99222 1ST HOSP IP/OBS MODERATE 55: CPT | Performed by: ORTHOPAEDIC SURGERY

## 2022-04-10 PROCEDURE — 99285 EMERGENCY DEPT VISIT HI MDM: CPT | Performed by: EMERGENCY MEDICINE

## 2022-04-10 PROCEDURE — 99285 EMERGENCY DEPT VISIT HI MDM: CPT

## 2022-04-10 PROCEDURE — 83605 ASSAY OF LACTIC ACID: CPT | Performed by: EMERGENCY MEDICINE

## 2022-04-10 PROCEDURE — 80053 COMPREHEN METABOLIC PANEL: CPT | Performed by: EMERGENCY MEDICINE

## 2022-04-10 PROCEDURE — 93971 EXTREMITY STUDY: CPT

## 2022-04-10 PROCEDURE — 36415 COLL VENOUS BLD VENIPUNCTURE: CPT | Performed by: EMERGENCY MEDICINE

## 2022-04-10 PROCEDURE — 84484 ASSAY OF TROPONIN QUANT: CPT | Performed by: EMERGENCY MEDICINE

## 2022-04-10 PROCEDURE — 85025 COMPLETE CBC W/AUTO DIFF WBC: CPT | Performed by: EMERGENCY MEDICINE

## 2022-04-10 PROCEDURE — 96374 THER/PROPH/DIAG INJ IV PUSH: CPT

## 2022-04-10 PROCEDURE — 85730 THROMBOPLASTIN TIME PARTIAL: CPT | Performed by: EMERGENCY MEDICINE

## 2022-04-10 PROCEDURE — 81001 URINALYSIS AUTO W/SCOPE: CPT | Performed by: EMERGENCY MEDICINE

## 2022-04-10 PROCEDURE — 93971 EXTREMITY STUDY: CPT | Performed by: SURGERY

## 2022-04-10 PROCEDURE — 87081 CULTURE SCREEN ONLY: CPT | Performed by: NURSE PRACTITIONER

## 2022-04-10 PROCEDURE — 83735 ASSAY OF MAGNESIUM: CPT | Performed by: EMERGENCY MEDICINE

## 2022-04-10 PROCEDURE — 87040 BLOOD CULTURE FOR BACTERIA: CPT | Performed by: EMERGENCY MEDICINE

## 2022-04-10 RX ORDER — LORATADINE 10 MG/1
10 TABLET ORAL DAILY
Status: DISCONTINUED | OUTPATIENT
Start: 2022-04-10 | End: 2022-04-14 | Stop reason: HOSPADM

## 2022-04-10 RX ORDER — SACCHAROMYCES BOULARDII 250 MG
250 CAPSULE ORAL 2 TIMES DAILY
Status: DISCONTINUED | OUTPATIENT
Start: 2022-04-10 | End: 2022-04-14 | Stop reason: HOSPADM

## 2022-04-10 RX ORDER — HYDROMORPHONE HCL/PF 1 MG/ML
0.5 SYRINGE (ML) INJECTION ONCE
Status: COMPLETED | OUTPATIENT
Start: 2022-04-10 | End: 2022-04-10

## 2022-04-10 RX ORDER — FLUTICASONE PROPIONATE 50 MCG
1 SPRAY, SUSPENSION (ML) NASAL DAILY
Status: DISCONTINUED | OUTPATIENT
Start: 2022-04-10 | End: 2022-04-14 | Stop reason: HOSPADM

## 2022-04-10 RX ORDER — GABAPENTIN 400 MG/1
800 CAPSULE ORAL 4 TIMES DAILY
Status: DISCONTINUED | OUTPATIENT
Start: 2022-04-10 | End: 2022-04-14 | Stop reason: HOSPADM

## 2022-04-10 RX ORDER — LISINOPRIL 2.5 MG/1
2.5 TABLET ORAL DAILY
Status: DISCONTINUED | OUTPATIENT
Start: 2022-04-10 | End: 2022-04-11

## 2022-04-10 RX ORDER — ACETAMINOPHEN 325 MG/1
975 TABLET ORAL EVERY 8 HOURS SCHEDULED
Status: DISCONTINUED | OUTPATIENT
Start: 2022-04-10 | End: 2022-04-14 | Stop reason: HOSPADM

## 2022-04-10 RX ORDER — ONDANSETRON 2 MG/ML
4 INJECTION INTRAMUSCULAR; INTRAVENOUS EVERY 6 HOURS PRN
Status: DISCONTINUED | OUTPATIENT
Start: 2022-04-10 | End: 2022-04-14 | Stop reason: HOSPADM

## 2022-04-10 RX ORDER — CEFAZOLIN SODIUM 2 G/50ML
2000 SOLUTION INTRAVENOUS EVERY 8 HOURS
Status: DISCONTINUED | OUTPATIENT
Start: 2022-04-10 | End: 2022-04-14 | Stop reason: HOSPADM

## 2022-04-10 RX ORDER — FUROSEMIDE 40 MG/1
40 TABLET ORAL DAILY
Status: DISCONTINUED | OUTPATIENT
Start: 2022-04-10 | End: 2022-04-14 | Stop reason: HOSPADM

## 2022-04-10 RX ORDER — DIPHENHYDRAMINE HYDROCHLORIDE 50 MG/ML
25 INJECTION INTRAMUSCULAR; INTRAVENOUS EVERY 6 HOURS PRN
Status: DISCONTINUED | OUTPATIENT
Start: 2022-04-10 | End: 2022-04-14 | Stop reason: HOSPADM

## 2022-04-10 RX ORDER — WARFARIN SODIUM 7.5 MG/1
7.5 TABLET ORAL DAILY
Status: DISCONTINUED | OUTPATIENT
Start: 2022-04-10 | End: 2022-04-11

## 2022-04-10 RX ORDER — HYDROMORPHONE HCL/PF 1 MG/ML
0.5 SYRINGE (ML) INJECTION EVERY 4 HOURS PRN
Status: DISCONTINUED | OUTPATIENT
Start: 2022-04-10 | End: 2022-04-12

## 2022-04-10 RX ORDER — DOCUSATE SODIUM 100 MG/1
100 CAPSULE, LIQUID FILLED ORAL 2 TIMES DAILY
Status: DISCONTINUED | OUTPATIENT
Start: 2022-04-10 | End: 2022-04-14 | Stop reason: HOSPADM

## 2022-04-10 RX ORDER — FAMOTIDINE 20 MG/1
20 TABLET, FILM COATED ORAL
Status: DISCONTINUED | OUTPATIENT
Start: 2022-04-10 | End: 2022-04-14 | Stop reason: HOSPADM

## 2022-04-10 RX ORDER — PRAMIPEXOLE DIHYDROCHLORIDE 0.5 MG/1
0.5 TABLET ORAL 2 TIMES DAILY
Status: DISCONTINUED | OUTPATIENT
Start: 2022-04-10 | End: 2022-04-14 | Stop reason: HOSPADM

## 2022-04-10 RX ORDER — OXYCODONE HYDROCHLORIDE 5 MG/1
5 TABLET ORAL EVERY 6 HOURS PRN
Status: DISCONTINUED | OUTPATIENT
Start: 2022-04-10 | End: 2022-04-14 | Stop reason: HOSPADM

## 2022-04-10 RX ORDER — CALCIUM ACETATE 667 MG/1
667 CAPSULE ORAL DAILY
Status: DISCONTINUED | OUTPATIENT
Start: 2022-04-10 | End: 2022-04-14 | Stop reason: HOSPADM

## 2022-04-10 RX ADMIN — CALCIUM ACETATE 667 MG: 667 CAPSULE ORAL at 12:33

## 2022-04-10 RX ADMIN — MAGNESIUM OXIDE TAB 400 MG (241.3 MG ELEMENTAL MG) 400 MG: 400 (241.3 MG) TAB at 17:28

## 2022-04-10 RX ADMIN — METOPROLOL TARTRATE 75 MG: 50 TABLET, FILM COATED ORAL at 12:33

## 2022-04-10 RX ADMIN — OXYCODONE HYDROCHLORIDE 5 MG: 5 TABLET ORAL at 23:32

## 2022-04-10 RX ADMIN — PRAMIPEXOLE DIHYDROCHLORIDE 0.5 MG: 0.5 TABLET ORAL at 21:45

## 2022-04-10 RX ADMIN — DOCUSATE SODIUM 100 MG: 100 CAPSULE ORAL at 12:34

## 2022-04-10 RX ADMIN — WARFARIN SODIUM 7.5 MG: 7.5 TABLET ORAL at 12:34

## 2022-04-10 RX ADMIN — CEFAZOLIN SODIUM 2000 MG: 2 SOLUTION INTRAVENOUS at 20:34

## 2022-04-10 RX ADMIN — GABAPENTIN 800 MG: 400 CAPSULE ORAL at 21:45

## 2022-04-10 RX ADMIN — HYDROMORPHONE HYDROCHLORIDE 0.5 MG: 1 INJECTION, SOLUTION INTRAMUSCULAR; INTRAVENOUS; SUBCUTANEOUS at 16:24

## 2022-04-10 RX ADMIN — LISINOPRIL 2.5 MG: 2.5 TABLET ORAL at 12:33

## 2022-04-10 RX ADMIN — PRAMIPEXOLE DIHYDROCHLORIDE 0.5 MG: 0.5 TABLET ORAL at 17:25

## 2022-04-10 RX ADMIN — CEFAZOLIN SODIUM 2000 MG: 2 SOLUTION INTRAVENOUS at 12:32

## 2022-04-10 RX ADMIN — LORATADINE 10 MG: 10 TABLET ORAL at 12:34

## 2022-04-10 RX ADMIN — Medication 250 MG: at 12:33

## 2022-04-10 RX ADMIN — DOCUSATE SODIUM 100 MG: 100 CAPSULE ORAL at 20:34

## 2022-04-10 RX ADMIN — FLUTICASONE PROPIONATE 1 SPRAY: 50 SPRAY, METERED NASAL at 12:36

## 2022-04-10 RX ADMIN — GABAPENTIN 800 MG: 400 CAPSULE ORAL at 12:33

## 2022-04-10 RX ADMIN — MAGNESIUM OXIDE TAB 400 MG (241.3 MG ELEMENTAL MG) 400 MG: 400 (241.3 MG) TAB at 12:33

## 2022-04-10 RX ADMIN — ACETAMINOPHEN 975 MG: 325 TABLET, FILM COATED ORAL at 14:27

## 2022-04-10 RX ADMIN — Medication 250 MG: at 17:25

## 2022-04-10 RX ADMIN — FUROSEMIDE 40 MG: 40 TABLET ORAL at 12:33

## 2022-04-10 RX ADMIN — OXYCODONE HYDROCHLORIDE 5 MG: 5 TABLET ORAL at 17:25

## 2022-04-10 RX ADMIN — ACETAMINOPHEN 975 MG: 325 TABLET, FILM COATED ORAL at 21:46

## 2022-04-10 RX ADMIN — HYDROMORPHONE HYDROCHLORIDE 0.5 MG: 1 INJECTION, SOLUTION INTRAMUSCULAR; INTRAVENOUS; SUBCUTANEOUS at 09:42

## 2022-04-10 RX ADMIN — HYDROMORPHONE HYDROCHLORIDE 0.5 MG: 1 INJECTION, SOLUTION INTRAMUSCULAR; INTRAVENOUS; SUBCUTANEOUS at 07:56

## 2022-04-10 RX ADMIN — GABAPENTIN 800 MG: 400 CAPSULE ORAL at 17:25

## 2022-04-10 RX ADMIN — B-COMPLEX W/ C & FOLIC ACID TAB 1 TABLET: TAB at 12:33

## 2022-04-10 NOTE — CONSULTS
Consultation - Musa Matias Angel 80 y o  female MRN: [id number]  Unit/Bed#: [address] [address] Encounter: [id number]      Assessment/Plan     Assessment:  Right lower extremity cellulitis  Plan:  I did speak with the hospitalist today and also spoke with Dr To Jung regarding Natividad's right knee  I did examine her and spoke with Natividad length  This does appear to be a cellulitis  The knee wound appears to be benign and although the erythema is spreading from the lower part of the leg up towards the inferior aspect of the wound  She is able to flex her knee to about 60° but with only minimal discomfort at the knee  She has severe discomfort with any light palpation of the lower portion of the leg where there is significant erythema  Thus, we would like to have her started on IV antibiotics and follow her over the course of the next 24 hours  At this point, it does not appear that the knee joint is at all involved  The knee x-ray appears to be quite benign and looks in excellent condition with good alignment of the prosthesis  She is on Coumadin although is subtherapeutic at 1 4 but is on a dose of 7 5 mg  She did have a negative ultrasound today for DVT  Dr To Jung and his team will follow up with her tomorrow in the hospital as she is admitted  History of Present Illness   Physician Requesting Consult: Carlos Holbrook MD  Reason for Consult / Principal Problem:  Right lower extremity pain  HPI: Rekha Solorio is a 80y o  year old female who presents with right lower extremity pain that has been waxing and waning over the course of the past 2 weeks since her right total knee arthroplasty performed by Dr To Jung on March 28  She has not had any fevers and was actually feeling quite good yesterday during the day with her right lower extremity but then overnight began having more severe pain into the lower portion of the right lower extremity    She did see Dr To Jung this past week on Wednesday and was found to have a benign incision  She states now however that her pain is sharp and severe and constant  It is certainly worse with any touching of the lower portion of the leg distal to her knee  She says that the knee pain has not changed however  She denies any fevers  Consults    Review of Systems   Constitutional: Negative  HENT: Negative  Eyes: Negative  Respiratory: Negative  Cardiovascular: Negative  Gastrointestinal: Negative  Endocrine: Negative  Genitourinary: Negative  Musculoskeletal: Positive for gait problem  Per HPI   Skin: Positive for wound  Erythema along the right lower extremity at the lower portion of the leg   Allergic/Immunologic: Negative  Hematological: Negative  Psychiatric/Behavioral: Negative          Historical Information   Past Medical History:   Diagnosis Date    Arthritis     Atrial fibrillation ([facility] )     Carrero's esophagus     last assessed: 1/23/2018    BRCA1 negative     BRCA2 negative     Breast cancer ([facility] ) 2006    stage 1 (left), given adjuvant radiation with Arimidex x 5 years    Cancer [facility]) 2006    Left Breast, Lumpectomy    Cataract     last assessed: 3/11/2014    Dysplasia of toenail     last assessed: 8/29/2017    Esophageal reflux     GERD (gastroesophageal reflux disease)     Gross hematuria     last assessed: 2/19/2015    Hematuria     Hiatal hernia     History of radiation therapy     Hypertension     Irregular heart beat     AFIB    Mixed sensory-motor polyneuropathy     Neuropathy     Obesity     Pacemaker     Paroxysmal atrial fibrillation (HCC)     Peripheral neuropathy     Rectal bleeding     Restless leg syndrome     Shortness of breath     last assessed: 1/11/2016     Past Surgical History:   Procedure Laterality Date    BREAST BIOPSY Left 2006    BREAST LUMPECTOMY Left 2006    onset: 2006    BREAST SURGERY      CARDIAC PACEMAKER PLACEMENT      x 3 2006    CATARACT EXTRACTION      COLONOSCOPY      CYSTOSCOPY  2014    diagnostic    HYSTERECTOMY      ALISON BSO; due to fibroid uterus; age 38    KNEE CARTILAGE SURGERY      excision lesion of meniscus or capsule knee    KNEE SURGERY      OOPHORECTOMY Bilateral     OTHER SURGICAL HISTORY      radiation therapy    [place] COLONOSCOPY FLX DX W/COLLJ SPEC WHEN PFRMD N/A 2017    Procedure: COLONOSCOPY;  Surgeon: Raeann Thomas MD;  [facility]; Service: Gastroenterology    [place] ESOPHAGOGASTRODUODENOSCOPY TRANSORAL DIAGNOSTIC N/A 2017    Procedure: ESOPHAGOGASTRODUODENOSCOPY (EGD); Surgeon: Petra Dove MD;  [facility];   Service: Gastroenterology    [place] TOTAL KNEE ARTHROPLASTY Left 2020    Procedure: ARTHROPLASTY KNEE TOTAL;  Surgeon: Shae Alvarenga DO;  Location: [address] [place] [address];  Service: Orthopedics    [place] TOTAL KNEE ARTHROPLASTY Right 3/28/2022    Procedure: ARTHROPLASTY KNEE TOTAL W ROBOT - RIGHT;  Surgeon: Shae Alvarenga DO;  Location: [address] [place] [address];  Service: Orthopedics    UPPER GASTROINTESTINAL ENDOSCOPY       Social History   Social History     Substance and Sexual Activity   Alcohol Use Not Currently     Social History     Substance and Sexual Activity   Drug Use No     E-Cigarette/Vaping    E-Cigarette Use Never User      E-Cigarette/Vaping Substances    Nicotine No     THC No     CBD No     Flavoring No     Other No     Unknown No      Social History     Tobacco Use   Smoking Status Former Smoker    Packs/day: 1 00    Years: 25 00    Pack years: 25 00    Types: Cigarettes    Quit date: 1980    Years since quittin 5   Smokeless Tobacco Never Used   Tobacco Comment    Quit over 30 years ago; quit age 41     Family History:   Family History   Problem Relation Age of Onset    Hypertension Mother     Heart disease Father     Aneurysm Father     Coronary artery disease Father         in his 76s with aneurysm    Aortic aneurysm Father         abdominal    Scleroderma Sister  Breast cancer Sister 76    Hypertension Sister     Cancer Sister     No Known Problems Son     No Known Problems Son     Testicular cancer Son 39    Thyroid cancer Son 45    Colon cancer Maternal Aunt     Colon cancer Maternal Aunt     Breast cancer Other 48        kaylee's daughter    Alcohol abuse Neg Hx     Substance Abuse Neg Hx     Mental illness Neg Hx     Depression Neg Hx        Meds/Allergies   current meds:   Current Facility-Administered Medications   Medication Dose Route Frequency    acetaminophen (TYLENOL) tablet 975 mg  975 mg Oral Q8H [address]    calcium acetate (PHOSLO) capsule 667 mg  667 mg Oral Daily    ceFAZolin (ANCEF) IVPB (premix in dextrose) 2,000 mg 50 mL  2,000 mg Intravenous Q8H    diphenhydrAMINE (BENADRYL) injection 25 mg  25 mg Intravenous Q6H PRN    docusate sodium (COLACE) capsule 100 mg  100 mg Oral BID    famotidine (PEPCID) tablet 20 mg  20 mg Oral HS PRN    fluticasone (FLONASE) 50 mcg/act nasal spray 1 spray  1 spray Each Nare Daily    furosemide (LASIX) tablet 40 mg  40 mg Oral Daily    gabapentin (NEURONTIN) capsule 800 mg  800 mg Oral 4x Daily    HYDROmorphone (DILAUDID) injection 0 5 mg  0 5 mg Intravenous Q4H PRN    lisinopril (ZESTRIL) tablet 2 5 mg  2 5 mg Oral Daily    loratadine (CLARITIN) tablet 10 mg  10 mg Oral Daily    magnesium oxide (MAG-OX) tablet 400 mg  400 mg Oral BID    metoprolol tartrate (LOPRESSOR) tablet 75 mg  75 mg Oral BID    multivitamin stress formula tablet 1 tablet  1 tablet Oral Daily    ondansetron (ZOFRAN) injection 4 mg  4 mg Intravenous Q6H PRN    oxyCODONE (ROXICODONE) IR tablet 5 mg  5 mg Oral Q6H PRN    pramipexole (MIRAPEX) tablet 0 5 mg  0 5 mg Oral BID    saccharomyces boulardii (FLORASTOR) capsule 250 mg  250 mg Oral BID    warfarin (COUMADIN) tablet 7 5 mg  7 5 mg Oral Daily     Allergies   Allergen Reactions    Latex      Added based on information entered during case entry, please review and add reactions, type, and severity as needed    Cephalexin Rash    Duloxetine Hcl Other (See Comments)     Facial pins and needles sensation    Erythromycin Rash    Levofloxacin Other (See Comments)     Muscular aches    Penicillins Rash    Savella [Milnacipran] Rash    Sulfa Antibiotics Rash       Objective   Vitals: Blood pressure 120/73, pulse 84, temperature 98 2 °F (36 8 °C), temperature source Oral, resp  rate 16, height 5' 4 5" (1 638 m), weight 103 kg (228 lb), SpO2 96 %  ,Body mass index is 38 53 kg/m²  No intake or output data in the 24 hours ending 04/10/22 1216  No intake/output data recorded  Invasive Devices  Report    Peripheral Intravenous Line            Peripheral IV 04/10/22 Right Antecubital <1 day                Physical Exam  Vitals reviewed  Constitutional:       Appearance: She is well-developed  HENT:      Head: Normocephalic and atraumatic  Right Ear: External ear normal       Left Ear: External ear normal    Eyes:      Conjunctiva/sclera: Conjunctivae normal    Cardiovascular:      Rate and Rhythm: Normal rate  Pulmonary:      Effort: Pulmonary effort is normal  No respiratory distress  Musculoskeletal:      Right knee: Effusion present  Skin:     General: Skin is warm  Capillary Refill: Capillary refill takes less than 2 seconds  Neurological:      Mental Status: She is alert and oriented to person, place, and time  Psychiatric:         Behavior: Behavior normal        Right Knee Exam     Tenderness   The patient is experiencing tenderness in the lateral joint line and medial hamstring  Range of Motion   Extension:  -20 abnormal   Flexion:  20 abnormal     Other   Erythema: absent  Scars: absent  Sensation: normal  Swelling: moderate  Effusion: effusion present    Comments:  Pain with any attempts at even micro motion about the right knee  Her knee does appear warm to palpation but has no erythema  There is a moderate effusion               Lab Results: CBC:   Lab Results   Component Value Date    WBC 10 47 (H) 04/10/2022    HGB 11 9 04/10/2022    HCT 36 5 04/10/2022    MCV 94 04/10/2022     04/10/2022    MCH 30 7 04/10/2022    MCHC 32 6 04/10/2022    RDW 14 4 04/10/2022    MPV 10 1 04/10/2022    NRBC 0 04/10/2022     Imaging Studies: I have personally reviewed pertinent films in PACS right knee x-rays taken today demonstrate intact total knee arthroplasty with excellent alignment and no bony changes

## 2022-04-10 NOTE — ASSESSMENT & PLAN NOTE
As noted above patient underwent right total knee replacement on 03/28 with Orthopedics  Right knee x-ray showed no osseous abnormality as per radiology report  Patient does have range of motion however limited by pain at this time    Pain control  Orthopedic consult

## 2022-04-10 NOTE — ED PROVIDER NOTES
History  Chief Complaint   Patient presents with    Leg Swelling     Pt is s/p knee replacement with Dr Taco Stevens on 3/28  Leg has been red, swollen, warm and painful since about 1 week after surgery  Pt had temp of 99 5 after taking tylenol      60-year-old female with past history of paroxysmal atrial fibrillation, GERD, hiatal hernia, hypertension, pacemaker placement, presents to the ED for evaluation of pain and swelling to right lower extremity  Patient is status post right total knee replacement on 03/28/2022 by Dr Taco Stevens  Patient is compliant with her Coumadin  Patient states that she has had ongoing pain since her surgery  Patient is on oxycodone and Tylenol which is not helping  Patient has noted increased swelling and redness to the right lower extremity along with low-grade temperature last night  The patient came to the ED for further evaluation  Patient appears to be in mild distress secondary to pain  Patient states that her temperature yesterday was 99 5  History provided by:  Patient      Prior to Admission Medications   Prescriptions Last Dose Informant Patient Reported? Taking? Calcium Acetate, Phos Binder, (CALCIUM ACETATE PO) 4/9/2022 at Unknown time Self Yes Yes   Sig: Take by mouth daily     Flovent  MCG/ACT inhaler Not Taking at Unknown time Self No No   Sig: INHALE 2 PUFFS 2 (TWO) TIMES A DAY RINSE MOUTH AFTER USE     Patient not taking: Reported on 4/10/2022    acetaminophen (TYLENOL) 325 mg tablet   No No   Sig: Take 3 tablets (975 mg total) by mouth every 8 (eight) hours   clobetasol (TEMOVATE) 0 05 % ointment   No No   Sig: Apply once weekly to the affected area   docusate sodium (COLACE) 100 mg capsule   No No   Sig: Take 1 capsule (100 mg total) by mouth 2 (two) times a day   enoxaparin (LOVENOX) 40 mg/0 4 mL   No No   Sig: Inject 0 4 mL (40 mg total) under the skin daily for 10 days   famotidine (PEPCID) 40 MG tablet 4/9/2022 at Unknown time Self No Yes   Sig: TAKE 1 TABLET BY MOUTH EVERY DAY   Patient taking differently: daily at bedtime as needed     fluticasone (FLONASE) 50 mcg/act nasal spray  Self No No   Sig: SPRAY 1 SPRAY INTO EACH NOSTRIL EVERY DAY   furosemide (LASIX) 40 mg tablet 2022 at Unknown time Self No Yes   Sig: TAKE 1 TABLET BY MOUTH EVERY DAY   gabapentin (NEURONTIN) 800 mg tablet 2022 at Unknown time Self No Yes   Sig: Take 1 tablet (800 mg total) by mouth 4 (four) times a day   lisinopril (ZESTRIL) 2 5 mg tablet 2022 at Unknown time Self No Yes   Sig: TAKE 1 TABLET BY MOUTH EVERY DAY   loratadine (CLARITIN) 10 mg tablet  Self Yes No   Sig: Take 10 mg by mouth daily   magnesium 30 MG tablet 2022 at Unknown time Self Yes Yes   Sig: Take 30 mg by mouth 2 (two) times a day   metoprolol tartrate (LOPRESSOR) 50 mg tablet 2022 at Unknown time  No Yes   Sig: Take 1 5 tablets (75 mg total) by mouth 2 (two) times a day   multivitamin (THERAGRAN) TABS  Self Yes No   Sig: Take 1 tablet by mouth daily   oxyCODONE (Roxicodone) 5 immediate release tablet   No No   Sig: Take 1/2 to 1 tablets by mouth every 4-6 hours as needed for pain   pramipexole (MIRAPEX) 0 5 mg tablet  Self No No   Si FULL TABLETS TWICE A DAY AT 5:00 P  M  AND 10:00 P  M    warfarin (COUMADIN) 7 5 mg tablet 2022 at Unknown time Self No Yes   Sig: TAKE 1 TABLET BY MOUTH EVERY DAY   Patient taking differently: Last dose 3/22/22       Facility-Administered Medications: None       Past Medical History:   Diagnosis Date    Arthritis     Atrial fibrillation ([facility] [address] [facility]     Carrero's esophagus     last assessed: 2018    BRCA1 negative     BRCA2 negative     Breast cancer ([facility] [address] [facility]     stage 1 (left), given adjuvant radiation with Arimidex x 5 years    Cancer [facility])     Left Breast, Lumpectomy    Cataract     last assessed: 3/11/2014    Dysplasia of toenail     last assessed: 2017    Esophageal reflux     GERD (gastroesophageal reflux disease)    Genesis Marroquin hematuria     last assessed: 2/19/2015    Hematuria     Hiatal hernia     History of radiation therapy     Hypertension     Irregular heart beat     AFIB    Mixed sensory-motor polyneuropathy     Neuropathy     Obesity     Pacemaker     Paroxysmal atrial fibrillation (HCC)     Peripheral neuropathy     Rectal bleeding     Restless leg syndrome     Shortness of breath     last assessed: 1/11/2016       Past Surgical History:   Procedure Laterality Date    BREAST BIOPSY Left 2006    BREAST LUMPECTOMY Left 2006    onset: 2006    BREAST SURGERY      CARDIAC PACEMAKER PLACEMENT      x 3 2006    CATARACT EXTRACTION      COLONOSCOPY      CYSTOSCOPY  04/04/2014    diagnostic    HYSTERECTOMY      ALISON BSO; due to fibroid uterus; age 38   Nel Boyle KNEE CARTILAGE SURGERY      excision lesion of meniscus or capsule knee    KNEE SURGERY      OOPHORECTOMY Bilateral     OTHER SURGICAL HISTORY      radiation therapy    [place] COLONOSCOPY FLX DX W/COLLJ SPEC WHEN PFRMD N/A 2/8/2017    Procedure: COLONOSCOPY;  Surgeon: Alannah Jones MD;  Location: [facility]; Service: Gastroenterology    NJ ESOPHAGOGASTRODUODENOSCOPY TRANSORAL DIAGNOSTIC N/A 9/20/2017    Procedure: ESOPHAGOGASTRODUODENOSCOPY (EGD); Surgeon: Alex Greenberg MD;  Location: [facility];   Service: Gastroenterology    NJ TOTAL KNEE ARTHROPLASTY Left 8/17/2020    Procedure: ARTHROPLASTY KNEE TOTAL;  Surgeon: Lm Cohen DO;  Location: [address] [place] [address];  Service: Orthopedics    [place] TOTAL KNEE ARTHROPLASTY Right 3/28/2022    Procedure: ARTHROPLASTY KNEE TOTAL W ROBOT - RIGHT;  Surgeon: Lm Cohen DO;  Location: [facility];  Service: Orthopedics    UPPER GASTROINTESTINAL ENDOSCOPY         Family History   Problem Relation Age of Onset    Hypertension Mother     Heart disease Father     Aneurysm Father     Coronary artery disease Father         in his 76s with aneurysm    Aortic aneurysm Father         abdominal    Scleroderma Sister     Breast cancer Sister 76    Hypertension Sister     Cancer Sister     No Known Problems Son     No Known Problems Son     Testicular cancer Son 39    Thyroid cancer Son 45    Colon cancer Maternal Aunt     Colon cancer Maternal Aunt     Breast cancer Other 48        kaylee's daughter    Alcohol abuse Neg Hx     Substance Abuse Neg Hx     Mental illness Neg Hx     Depression Neg Hx      I have reviewed and agree with the history as documented  E-Cigarette/Vaping    E-Cigarette Use Never User      E-Cigarette/Vaping Substances    Nicotine No     THC No     CBD No     Flavoring No     Other No     Unknown No      Social History     Tobacco Use    Smoking status: Former Smoker     Packs/day: 1 00     Years: 25 00     Pack years: 25 00     Types: Cigarettes     Quit date: 1980     Years since quittin 5    Smokeless tobacco: Never Used    Tobacco comment: Quit over 30 years ago; quit age 41   Vaping Use    Vaping Use: Never used   Substance Use Topics    Alcohol use: Not Currently    Drug use: No       Review of Systems   Constitutional: Negative for activity change, appetite change, chills and fever  HENT: Negative for congestion and ear pain  Eyes: Negative for pain and discharge  Respiratory: Negative for cough, chest tightness, shortness of breath, wheezing and stridor  Cardiovascular: Negative for chest pain and palpitations  Gastrointestinal: Negative for abdominal distention, abdominal pain, constipation, diarrhea and nausea  Endocrine: Negative for cold intolerance  Genitourinary: Negative for dysuria, frequency and urgency  Musculoskeletal: Positive for arthralgias  Negative for back pain  Skin: Positive for wound  Negative for color change and rash  Allergic/Immunologic: Negative for environmental allergies and food allergies  Neurological: Negative for dizziness, weakness, numbness and headaches  Hematological: Negative for adenopathy     Psychiatric/Behavioral: Negative for agitation, behavioral problems and confusion  The patient is not nervous/anxious  All other systems reviewed and are negative  Physical Exam  Physical Exam  Vitals and nursing note reviewed  Constitutional:       Appearance: She is well-developed  HENT:      Head: Normocephalic and atraumatic  Eyes:      Conjunctiva/sclera: Conjunctivae normal    Cardiovascular:      Rate and Rhythm: Normal rate and regular rhythm  Heart sounds: Normal heart sounds  Pulmonary:      Effort: Pulmonary effort is normal       Breath sounds: Normal breath sounds  Abdominal:      General: Bowel sounds are normal  There is no distension  Palpations: Abdomen is soft  Tenderness: There is no abdominal tenderness  Musculoskeletal:         General: Normal range of motion  Cervical back: Normal range of motion and neck supple  Comments: Pulses intact to bilateral lower extremities  Erythema, edema as well as warm to touch noted to right lower extremities  Range of motion of right knee is limited secondary to pain  Generalized tenderness noted to palpation of entire leg  Compartments are soft  Skin:     General: Skin is warm and dry  Neurological:      Mental Status: She is alert and oriented to person, place, and time  Psychiatric:         Behavior: Behavior normal          Thought Content:  Thought content normal          Judgment: Judgment normal          Vital Signs  ED Triage Vitals [04/10/22 0702]   Temperature Pulse Respirations Blood Pressure SpO2   (!) 97 3 °F (36 3 °C) 92 20 113/55 98 %      Temp Source Heart Rate Source Patient Position - Orthostatic VS BP Location FiO2 (%)   Oral Monitor Lying Right arm --      Pain Score       5           Vitals:    04/10/22 0800 04/10/22 0815 04/10/22 0830 04/10/22 0954   BP: 109/56 114/60 122/66 117/63   Pulse: 82 86 90 82   Patient Position - Orthostatic VS:             Visual Acuity      ED Medications  Medications HYDROmorphone (DILAUDID) injection 0 5 mg (0 5 mg Intravenous Given 4/10/22 0756)   HYDROmorphone (DILAUDID) injection 0 5 mg (0 5 mg Intravenous Given 4/10/22 0942)       Diagnostic Studies  Results Reviewed     Procedure Component Value Units Date/Time    HS Troponin I 2hr [[id number]]  (Normal) Collected: 04/10/22 0952    Lab Status: Final result Specimen: Blood from Hand, Right Updated: 04/10/22 1028     hs TnI 2hr 11 ng/L      Delta 2hr hsTnI 0 ng/L     Blood culture #1 [[id number]] Collected: 04/10/22 0952    Lab Status: In process Specimen: Blood from Hand, Right Updated: 04/10/22 1000    HS Troponin 0hr (reflex protocol) [[id number]]  (Normal) Collected: 04/10/22 0748    Lab Status: Final result Specimen: Blood from Arm, Right Updated: 04/10/22 0821     hs TnI 0hr 11 ng/L     HS Troponin I 4hr [[id number]]     Lab Status: No result Specimen: Blood     Lactic acid [[id number]]  (Normal) Collected: 04/10/22 0748    Lab Status: Final result Specimen: Blood from Arm, Right Updated: 04/10/22 0819     LACTIC ACID 1 2 mmol/L     Narrative:      Result may be elevated if tourniquet was used during collection      Urine Microscopic [[id number]]  (Abnormal) Collected: 04/10/22 0755    Lab Status: Final result Specimen: Urine, Clean Catch Updated: 04/10/22 0813     RBC, UA 0-1 /hpf      WBC, UA 4-10 /hpf      Epithelial Cells Occasional /hpf      Bacteria, UA Occasional /hpf      AMORPH URATES Occasional /hpf     Protime-INR [[id number]]  (Abnormal) Collected: 04/10/22 0748    Lab Status: Final result Specimen: Blood from Arm, Right Updated: 04/10/22 0812     Protime 17 4 seconds      INR 1 47    APTT [[id number]]  (Abnormal) Collected: 04/10/22 0748    Lab Status: Final result Specimen: Blood from Arm, Right Updated: 04/10/22 0812     PTT 38 seconds     Comprehensive metabolic panel [[id number]]  (Abnormal) Collected: 04/10/22 0748    Lab Status: Final result Specimen: Blood from Arm, Right Updated: 04/10/22 4547     Sodium 131 mmol/L      Potassium 4 1 mmol/L      Chloride 94 mmol/L      CO2 29 mmol/L      ANION GAP 8 mmol/L      BUN 35 mg/dL      Creatinine 0 97 mg/dL      Glucose 102 mg/dL      Calcium 9 1 mg/dL      AST 23 U/L      ALT 31 U/L      Alkaline Phosphatase 63 U/L      Total Protein 7 6 g/dL      Albumin 3 6 g/dL      Total Bilirubin 1 13 mg/dL      eGFR 55 ml/min/1 73sq m     Narrative:      [place] guidelines for Chronic Kidney Disease (CKD):     Stage 1 with normal or high GFR (GFR > 90 mL/min/1 73 square meters)    Stage 2 Mild CKD (GFR = 60-89 mL/min/1 73 square meters)    Stage 3A Moderate CKD (GFR = 45-59 mL/min/1 73 square meters)    Stage 3B Moderate CKD (GFR = 30-44 mL/min/1 73 square meters)    Stage 4 Severe CKD (GFR = 15-29 mL/min/1 73 square meters)    Stage 5 End Stage CKD (GFR <15 mL/min/1 73 square meters)  Note: GFR calculation is accurate only with a steady state creatinine    Magnesium [[id number]]  (Normal) Collected: 04/10/22 0748    Lab Status: Final result Specimen: Blood from Arm, Right Updated: 04/10/22 3448     Magnesium 2 3 mg/dL     UA w Reflex to Microscopic w Reflex to Culture [[id number]]  (Abnormal) Collected: 04/10/22 0752    Lab Status: Final result Specimen: Urine, Clean Catch Updated: 04/10/22 0802     Color, UA Light Yellow     Clarity, UA Clear     Specific Gravity, UA <=1 005     pH, UA 5 5     Leukocytes, UA Small     Nitrite, UA Negative     Protein, UA Negative mg/dl      Glucose, UA Negative mg/dl      Ketones, UA Negative mg/dl      Urobilinogen, UA 0 2 E U /dl      Bilirubin, UA Negative     Blood, UA Negative    CBC and differential [[id number]]  (Abnormal) Collected: 04/10/22 0748    Lab Status: Final result Specimen: Blood from Arm, Right Updated: 04/10/22 3347     WBC 10 47 Thousand/uL      RBC 3 88 Million/uL      Hemoglobin 11 9 g/dL      Hematocrit 36 5 %      MCV 94 fL      MCH 30 7 pg      MCHC 32 6 g/dL      RDW 14 4 %      MPV 10 1 fL Platelets 494 Thousands/uL      nRBC 0 /100 WBCs      Neutrophils Relative 75 %      Immat GRANS % 1 %      Lymphocytes Relative 15 %      Monocytes Relative 9 %      Eosinophils Relative 0 %      Basophils Relative 0 %      Neutrophils Absolute 7 79 Thousands/µL      Immature Grans Absolute 0 07 Thousand/uL      Lymphocytes Absolute 1 61 Thousands/µL      Monocytes Absolute 0 96 Thousand/µL      Eosinophils Absolute 0 00 Thousand/µL      Basophils Absolute 0 04 Thousands/µL     Blood culture #2 [[id number]] Collected: 04/10/22 0748    Lab Status: In process Specimen: Blood from Arm, Right Updated: 04/10/22 0757                 VAS lower limb venous duplex study, unilateral/limited    (Results Pending)   XR knee 4+ views Right injury    (Results Pending)              Procedures  ECG 12 Lead Documentation Only    Date/Time: 4/10/2022 8:31 AM  Performed by: Anita Vu DO  Authorized by: Anita Vu DO     Indications / Diagnosis:  Knee pain  ECG reviewed by me, the ED Provider: yes    Patient location:  ED  Previous ECG:     Previous ECG:  Compared to current    Similarity:  No change    Comparison to cardiac monitor: Yes    Interpretation:     Interpretation: abnormal    Comments:      Irregularly irregular rhythm, rate 81, left axis deviation, no acute ST elevations noted, low amplitude T-waves noted throughout, nonspecific T-wave abnormalities, atrial fibrillation, unchanged study from baseline  ED Course  ED Course as of 04/10/22 1031   Sun Apr 10, 2022   0840 Case discussed with orthopedic surgeon on-call, Dr Cheyanne Galeana on who agrees with the blood work as well as venous duplex study of right lower extremity  At this time patient can be admitted for pain control  Dr Cheyanne Galeana will see patient in consult  3295 Patient re-examined at bedside  Patient continues to have right knee pain after IV Dilaudid was given  Patient is requesting something more for pain    Will re-dose pain medication  Patient is agreeable to admission plans for intractable pain  5043 Vascular tech at bedside performing venous duplex study  4865 Case discussed with vascular tech who knows patient is negative for DVT for venous duplex of right lower extremity  MDM  Number of Diagnoses or Management Options  Intractable pain: new and requires workup  Status post right knee replacement: new and requires workup  Diagnosis management comments: Obtain blood work, venous duplex of right lower extremity, right knee x-ray  Consult orthopedic surgery  Give IV narcotics for pain and plan for admission       Amount and/or Complexity of Data Reviewed  Clinical lab tests: ordered and reviewed  Tests in the radiology section of CPT®: ordered and reviewed  Tests in the medicine section of CPT®: ordered and reviewed  Review and summarize past medical records: yes  Independent visualization of images, tracings, or specimens: yes    Risk of Complications, Morbidity, and/or Mortality  General comments: Blood work only shows mild elevation of white count  Patient's pain is controlled in the ED with multiple doses of IV narcotics  Venous duplex of right lower extremity is negative for DVT  At this time patient's symptoms are most likely secondary to postsurgical changes  At this time patient will be admitted for pain control  Case discussed with orthopedic surgeon on-call, Dr Hai Falcon, who agrees with admission  Dr Hai Falcon will see patient in consult        Patient Progress  Patient progress: stable      Disposition  Final diagnoses:   Intractable pain   Status post right knee replacement     Time reflects when diagnosis was documented in both MDM as applicable and the Disposition within this note     Time User Action Codes Description Comment    4/10/2022  8:51 AM Marlee Basilio Add [A41 9] Sepsis ([facility] [address] )     4/10/2022  8:51 AM Marlee Basilio Add [M00 9] Septic arthritis of knee, right ([facility] [address] )     4/10/2022  8:57 AM Ferdous, Levora Sherwood Modify [M00 9] [facility]     4/10/2022  8:57 AM Linda Millan Remove [A41 9] [facility]     4/10/2022  8:57 AM Ferdous, Levora Sherwood Remove [M00 9] [facility] knee[facility]     4/10/2022  8:57 AM Ferdous, Levora Sherwood Add [R52] Intractable pain     4/10/2022  8:58 AM Linda Millan Add [C09 620] Status post right knee replacement       ED Disposition     ED Disposition Condition Date/Time Comment    Admit Stable Sun Apr 10, 2022  9:02 AM Case was discussed with Dr Genia Aponte and the patient's admission status was agreed to be Admission Status:  Observation status to the service of Dr Genia Aponte  Follow-up Information    None         Patient's Medications   Discharge Prescriptions    No medications on file       No discharge procedures on file      PDMP Review       Value Time User    PDMP Reviewed  Yes 4/5/2022 10:03 AM Sam Schofield PA-C          ED Provider  Electronically Signed by           Bruce Gramajo DO  04/10/22 401 Jesse Dee DO  04/10/22 1031

## 2022-04-10 NOTE — Clinical Note
Case was discussed with Dr Citlali Lindo and the patient's admission status was agreed to be Admission Status: inpatient status to the service of Dr Citlali Lindo

## 2022-04-10 NOTE — ASSESSMENT & PLAN NOTE
Cellulitis of right lower extremity present on admission as evidence by erythema, warmth , swelling and pain to palpation  Patient presented to the emergency department secondary to increased redness, pain and low-grade fever at home  In the ED WBCs noted to be mildly elevated at 10  47  Venous duplex was performed, negative for DVT as per discussion with ER provider  Patient had recently undergone a total right knee replacement on 03/28 with Orthopedics  X-ray of right knee showed no osseous abnormality as per radiology report  Right lower extremity marked for area of erythema  Will start patient on Ancef 2 g IV q 8 hours  Continue with serial leg exams  Consult Orthopedics

## 2022-04-10 NOTE — PLAN OF CARE
Problem: Potential for Falls  Goal: Patient will remain free of falls  Description: INTERVENTIONS:  - Educate patient/family on patient safety including physical limitations  - Instruct patient to call for assistance with activity   - Consult OT/PT to assist with strengthening/mobility   - Keep Call bell within reach  - Keep bed low and locked with side rails adjusted as appropriate  - Keep care items and personal belongings within reach  - Initiate and maintain comfort rounds  - Make Fall Risk Sign visible to staff  - Offer Toileting every  Hours, in advance of need  - Initiate/Maintain alarm  - Obtain necessary fall risk management equipment:   - Apply yellow socks and bracelet for high fall risk patients  - Consider moving patient to room near nurses station  Outcome: Progressing     Problem: MOBILITY - ADULT  Goal: Maintain or return to baseline ADL function  Description: INTERVENTIONS:  -  Assess patient's ability to carry out ADLs; assess patient's baseline for ADL function and identify physical deficits which impact ability to perform ADLs (bathing, care of mouth/teeth, toileting, grooming, dressing, etc )  - Assess/evaluate cause of self-care deficits   - Assess range of motion  - Assess patient's mobility; develop plan if impaired  - Assess patient's need for assistive devices and provide as appropriate  - Encourage maximum independence but intervene and supervise when necessary  - Involve family in performance of ADLs  - Assess for home care needs following discharge   - Consider OT consult to assist with ADL evaluation and planning for discharge  - Provide patient education as appropriate  Outcome: Progressing  Goal: Maintains/Returns to pre admission functional level  Description: INTERVENTIONS:  - Perform BMAT or MOVE assessment daily    - Set and communicate daily mobility goal to care team and patient/family/caregiver     - Collaborate with rehabilitation services on mobility goals if consulted  - Perform Range of Motion times a day  - Reposition patient every  hours    - Dangle patient  times a day  - Stand patient  times a day  - Ambulate patient  times a day  - Out of bed to chair  times a day   - Out of bed for meals  times a day  - Out of bed for toileting  - Record patient progress and toleration of activity level   Outcome: Progressing     Problem: SKIN/TISSUE INTEGRITY - ADULT  Goal: Incision(s), wounds(s) or drain site(s) healing without S/S of infection  Description: INTERVENTIONS  - Assess and document dressing, incision, wound bed, drain sites and surrounding tissue  - Provide patient and family education  - Perform skin care/dressing changes every   Outcome: Progressing     Problem: MUSCULOSKELETAL - ADULT  Goal: Maintain or return mobility to safest level of function  Description: INTERVENTIONS:  - Assess patient's ability to carry out ADLs; assess patient's baseline for ADL function and identify physical deficits which impact ability to perform ADLs (bathing, care of mouth/teeth, toileting, grooming, dressing, etc )  - Assess/evaluate cause of self-care deficits   - Assess range of motion  - Assess patient's mobility  - Assess patient's need for assistive devices and provide as appropriate  - Encourage maximum independence but intervene and supervise when necessary  - Involve family in performance of ADLs  - Assess for home care needs following discharge   - Consider OT consult to assist with ADL evaluation and planning for discharge  - Provide patient education as appropriate  Outcome: Progressing

## 2022-04-10 NOTE — ASSESSMENT & PLAN NOTE
Patient has a history of essential hypertension  Will continue with patient's home medication lisinopril 2 5 mg daily, Lopressor 75 mg p o  B i d   Vital signs as per unit routine    Monitor

## 2022-04-10 NOTE — ASSESSMENT & PLAN NOTE
Patient has a history of atrial fibrillation, status post pacemaker insertion  Will continue with patient's home medication lisinopril 2 5 mg daily, Lopressor 75 mg p o  B i d  and Coumadin 7 5 mg daily  Daily INR  Rate control 80 to 90s  Denies any chest discomfort or palpitations  Vital signs as per unit routine    Monitor

## 2022-04-10 NOTE — ASSESSMENT & PLAN NOTE
Patient has a history of restless leg syndrome, follows with outpatient Neurology  Will continue patient's home medication Mirapex 0 5 mg 2 tabs at 5:00 p m  And 10:00 p m  Melissa Shanelle Patient reports that she gets good relief with this, when she misses a pill then she experiences discomfort

## 2022-04-10 NOTE — H&P
[address] 1940, 80 y o  female MRN: [id number]  Unit/Bed#: 2 South 202 Encounter: [id number]  Primary Care Provider: Rome Francisco MD   Date and time admitted to hospital: 4/10/2022  6:58 AM    * Cellulitis of right lower extremity  Assessment & Plan  Cellulitis of right lower extremity present on admission as evidence by erythema, warmth , swelling and pain to palpation  Patient presented to the emergency department secondary to increased redness, pain and low-grade fever at home  In the ED WBCs noted to be mildly elevated at 10  47  Venous duplex was performed, negative for DVT as per discussion with ER provider  Patient had recently undergone a total right knee replacement on 03/28 with Orthopedics  X-ray of right knee showed no osseous abnormality as per radiology report  Right lower extremity marked for area of erythema  Will start patient on Ancef 2 g IV q 8 hours  Continue with serial leg exams  Consult Orthopedics  Status post total knee replacement using cement, right  Assessment & Plan  As noted above patient underwent right total knee replacement on 03/28 with Orthopedics  Right knee x-ray showed no osseous abnormality as per radiology report  Patient does have range of motion however limited by pain at this time  Pain control  Orthopedic consult    Atrial fibrillation [facility]) [[id number]]  Assessment & Plan  Patient has a history of atrial fibrillation, status post pacemaker insertion  Will continue with patient's home medication lisinopril 2 5 mg daily, Lopressor 75 mg p o  B i d  and Coumadin 7 5 mg daily  Daily INR  Rate control 80 to 90s  Denies any chest discomfort or palpitations  Vital signs as per unit routine  Monitor    Essential hypertension  Assessment & Plan  Patient has a history of essential hypertension  Will continue with patient's home medication lisinopril 2 5 mg daily, Lopressor 75 mg p o  B  i  d     Vital signs as per unit routine  Monitor        RLS (restless legs syndrome)  Assessment & Plan  Patient has a history of restless leg syndrome, follows with outpatient Neurology  Will continue patient's home medication Mirapex 0 5 mg 2 tabs at 5:00 p m  And 10:00 p m  Marques Yarbrough Patient reports that she gets good relief with this, when she misses a pill then she experiences discomfort  Peripheral neuropathy  Assessment & Plan  Patient has a history of peripheral neuropathy, uses gabapentin 800 mg 4 times daily  Will continue  VTE Pharmacologic Prophylaxis: VTE Score: 10 High Risk (Score >/= 5) - Pharmacological DVT Prophylaxis Ordered: warfarin (Coumadin)  Sequential Compression Devices Ordered  Code Status: Level 1 - Full Code   Discussion with family: Updated  () at bedside  Anticipated Length of Stay: Patient will be admitted on an observation basis with an anticipated length of stay of less than 2 midnights secondary to IV abx, serial leg exams, pain control, ortho consult  Total Time for Visit, including Counseling / Coordination of Care: 60 minutes Greater than 50% of this total time spent on direct patient counseling and coordination of care  Chief Complaint:  Right leg pain    History of Present Illness:  Brayden Hernanedz is a 80 y o  female with a PMH of PAF on Coumadin status post pacemaker, GERD, hiatal hernia, hypertension, restless leg syndrome who presents with right leg pain  Patient reports that she underwent a total right knee replacement with Orthopedics on 03/28  She said initially she had been doing well however started to notice increased swelling of her right lower extremity with associated redness  Earlier in the week she had been seen in the orthopedics office, indicated the redness was not as significant as day of admission    She indicated that the evening prior to admission she checked her temperature which was 99 5 as she was experiencing chills  Pain continued to increase in spite of patient taking her prescribed pain medication, and patient felt the swelling and redness were increasing     This prompted her to present to the emergency department for further evaluation and treatment  In the ED WBCs noted to be slightly elevated at 10 47, patient afebrile  Venous duplex was performed in the ED which was negative for DVT  Right knee x-ray was performed which showed no osseous abnormalities  Her right lower extremity was bright red mostly on the anterior aspect, extremely warm, swollen and painful to touch  Patient did receive IV Dilaudid in the emergency department with some relief from pain  Will continue with oxycodone for moderate pain and Dilaudid for severe pain  Will start Ancef 2 g IV q 8 hours for cellulitis right lower extremity  Orthopedic is consulted  This was discussed with the patient and her  at the bedside, they verbalized understanding and are agreeable to the plan  Code status was also discussed with the patient and  at the bedside, patient is to be a full code  Review of Systems:  Review of Systems   Constitutional: Positive for chills and fever  HENT: Negative  Eyes: Negative  Respiratory: Negative for shortness of breath  Cardiovascular: Positive for leg swelling  Negative for chest pain and palpitations  Gastrointestinal: Negative for constipation, diarrhea, nausea and vomiting  Endocrine: Negative  Genitourinary: Negative  Musculoskeletal: Positive for gait problem and joint swelling  Skin: Positive for color change  Right lower extremity demonstrates erythema from knee, noted ecchymosis above and below-knee region  Mepelix dressing present on knee, clean dry and intact   Allergic/Immunologic: Negative  Neurological: Negative for dizziness and light-headedness  Hematological: Negative  Psychiatric/Behavioral: Negative          Past Medical and Surgical History: Past Medical History:   Diagnosis Date    Arthritis     Atrial fibrillation ([facility] [address] )     Carrero's esophagus     last assessed: 1/23/2018    BRCA1 negative     BRCA2 negative     Breast cancer ([facility] [address] ) 2006    stage 1 (left), given adjuvant radiation with Arimidex x 5 years    Cancer [facility]) 2006    Left Breast, Lumpectomy    Cataract     last assessed: 3/11/2014    Dysplasia of toenail     last assessed: 8/29/2017    Esophageal reflux     GERD (gastroesophageal reflux disease)     Gross hematuria     last assessed: 2/19/2015    Hematuria     Hiatal hernia     History of radiation therapy     Hypertension     Irregular heart beat     AFIB    Mixed sensory-motor polyneuropathy     Neuropathy     Obesity     Pacemaker     Paroxysmal atrial fibrillation (HCC)     Peripheral neuropathy     Rectal bleeding     Restless leg syndrome     Shortness of breath     last assessed: 1/11/2016       Past Surgical History:   Procedure Laterality Date    BREAST BIOPSY Left 2006    BREAST LUMPECTOMY Left 2006    onset: 2006    BREAST SURGERY      CARDIAC PACEMAKER PLACEMENT      x 3 2006    CATARACT EXTRACTION      COLONOSCOPY      CYSTOSCOPY  04/04/2014    diagnostic    HYSTERECTOMY      ALISON BSO; due to fibroid uterus; age 38   Edward Spinner KNEE CARTILAGE SURGERY      excision lesion of meniscus or capsule knee    KNEE SURGERY      OOPHORECTOMY Bilateral     OTHER SURGICAL HISTORY      radiation therapy    TN COLONOSCOPY FLX DX W/COLLJ SPEC WHEN PFRMD N/A 2/8/2017    Procedure: COLONOSCOPY;  Surgeon: Georgie Siddiqui MD;  [facility]; Service: Gastroenterology    TN ESOPHAGOGASTRODUODENOSCOPY TRANSORAL DIAGNOSTIC N/A 9/20/2017    Procedure: ESOPHAGOGASTRODUODENOSCOPY (EGD); Surgeon: Felix Montano MD;  Location: [facility];   Service: Gastroenterology    TN TOTAL KNEE ARTHROPLASTY Left 8/17/2020    Procedure: ARTHROPLASTY KNEE TOTAL;  Surgeon: Lucia Raya DO;  Location: [address] [place] [address];  Service: Orthopedics  TX TOTAL KNEE ARTHROPLASTY Right 3/28/2022    Procedure: ARTHROPLASTY KNEE TOTAL W ROBOT - RIGHT;  Surgeon: Rosio Heard DO;  Location: [facility];  Service: Orthopedics    UPPER GASTROINTESTINAL ENDOSCOPY         Meds/Allergies:  Prior to Admission medications    Medication Sig Start Date End Date Taking? Authorizing Provider   acetaminophen (TYLENOL) 325 mg tablet Take 3 tablets (975 mg total) by mouth every 8 (eight) hours 3/29/22  Yes Edwena Schaumann, PA-C   Calcium Acetate, Phos Binder, (CALCIUM ACETATE PO) Take by mouth daily     Yes Historical Provider, MD   clobetasol (TEMOVATE) 0 05 % ointment Apply once weekly to the affected area 3/15/22  Yes Sandrine King PA-C   docusate sodium (COLACE) 100 mg capsule Take 1 capsule (100 mg total) by mouth 2 (two) times a day 3/29/22  Yes Edwena Schaumann, PA-C   famotidine (PEPCID) 40 MG tablet TAKE 1 TABLET BY MOUTH EVERY DAY  Patient taking differently: daily at bedtime as needed   1/6/22  Yes Nhi العراقي MD   furosemide (LASIX) 40 mg tablet TAKE 1 TABLET BY MOUTH EVERY DAY 2/23/22  Yes Doris Mead MD   gabapentin (NEURONTIN) 800 mg tablet Take 1 tablet (800 mg total) by mouth 4 (four) times a day 1/11/22  Yes Marry Robins MD   lisinopril (ZESTRIL) 2 5 mg tablet TAKE 1 TABLET BY MOUTH EVERY DAY 3/1/22  Yes Edwardo Ledesma MD   magnesium 30 MG tablet Take 30 mg by mouth 2 (two) times a day   Yes Historical Provider, MD   metoprolol tartrate (LOPRESSOR) 50 mg tablet Take 1 5 tablets (75 mg total) by mouth 2 (two) times a day 3/21/22  Yes Doris Mead MD   multivitamin [facility]) TABS Take 1 tablet by mouth daily   Yes Historical Provider, MD   oxyCODONE (Roxicodone) 5 immediate release tablet Take 1/2 to 1 tablets by mouth every 4-6 hours as needed for pain 4/5/22  Yes Edwena Schaumann, PA-C   pramipexole (MIRAPEX) 0 5 mg tablet 2 FULL TABLETS TWICE A DAY AT 5:00 P  M   AND 10:00 P M  2/7/22  Yes Marry Robins MD   warfarin (COUMADIN) 7 5 mg tablet TAKE 1 TABLET BY MOUTH EVERY DAY  Patient taking differently: Last dose 3/22/22  7/17/21  Yes Dneisse Cavazos MD   enoxaparin (LOVENOX) 40 mg/0 4 mL Inject 0 4 mL (40 mg total) under the skin daily for 10 days 3/29/22 4/8/22  Delisa Watkins PA-C   Flovent  MCG/ACT inhaler INHALE 2 PUFFS 2 (TWO) TIMES A DAY RINSE MOUTH AFTER USE  Patient not taking: Reported on 4/10/2022  8/23/21   Reina Franklin MD   fluticasone [facility]) 50 mcg/act nasal spray SPRAY 1 SPRAY INTO EACH NOSTRIL EVERY DAY 9/3/21   Renia Franklin MD   loratadine (CLARITIN) 10 mg tablet Take 10 mg by mouth daily    Historical Provider, MD     I have reviewed home medications using recent Epic encounter  Allergies:    Allergies   Allergen Reactions    Latex      Added based on information entered during case entry, please review and add reactions, type, and severity as needed    Cephalexin Rash    Duloxetine Hcl Other (See Comments)     Facial pins and needles sensation    Erythromycin Rash    Levofloxacin Other (See Comments)     Muscular aches    Penicillins Rash    Savella [Milnacipran] Rash    Sulfa Antibiotics Rash       Social History:  Marital Status: /Civil Union   Occupation: retired  Patient Pre-hospital Living Situation: Home  Patient Pre-hospital Level of Mobility: walks with walker  Patient Pre-hospital Diet Restrictions:  Cardiac  Substance Use History:   Social History     Substance and Sexual Activity   Alcohol Use Not Currently     Social History     Tobacco Use   Smoking Status Former Smoker    Packs/day: 1 00    Years: 25 00    Pack years: 25 00    Types: Cigarettes    Quit date: 1980    Years since quittin 5   Smokeless Tobacco Never Used   Tobacco Comment    Quit over 30 years ago; quit age 41     Social History     Substance and Sexual Activity   Drug Use No       Family History:  Family History   Problem Relation Age of Onset    Hypertension Mother     Heart disease Father     Aneurysm Father     Coronary artery disease Father         in his 76s with aneurysm    Aortic aneurysm Father         abdominal    Scleroderma Sister     Breast cancer Sister 76    Hypertension Sister     Cancer Sister     No Known Problems Son     No Known Problems Son     Testicular cancer Son 39    Thyroid cancer Son 45    Colon cancer Maternal Aunt     Colon cancer Maternal Aunt     Breast cancer Other 48        kaylee's daughter    Alcohol abuse Neg Hx     Substance Abuse Neg Hx     Mental illness Neg Hx     Depression Neg Hx        Physical Exam:     Vitals:   Blood Pressure: 120/73 (04/10/22 1059)  Pulse: 84 (04/10/22 1059)  Temperature: 98 2 °F (36 8 °C) (04/10/22 1059)  Temp Source: Oral (04/10/22 1059)  Respirations: 16 (04/10/22 1059)  Height: 5' 4 5" (163 8 cm) (04/10/22 1059)  Weight - Scale: 103 kg (228 lb) (04/10/22 1059)  SpO2: 96 % (04/10/22 1059)    Physical Exam  Vitals and nursing note reviewed  Constitutional:       General: She is not in acute distress  Appearance: She is obese  She is not ill-appearing  HENT:      Head: Normocephalic  Mouth/Throat:      Mouth: Mucous membranes are moist    Eyes:      Extraocular Movements: Extraocular movements intact  Conjunctiva/sclera: Conjunctivae normal    Cardiovascular:      Rate and Rhythm: Normal rate  Rhythm irregular  Pulses: Normal pulses  Heart sounds: Murmur heard  Pulmonary:      Effort: Pulmonary effort is normal       Breath sounds: Normal breath sounds  Abdominal:      General: Bowel sounds are normal  There is no distension  Palpations: Abdomen is soft  Tenderness: There is no abdominal tenderness  Comments: Abdomen obese   Genitourinary:     Comments: Voiding spontaneously  Musculoskeletal:      Cervical back: Normal range of motion  Right lower leg: Edema present        Comments: Right LE +swelling, erythema, warmth, pain with palpation particularly inner aspect of calf  Skin:     Capillary Refill: Capillary refill takes less than 2 seconds  Findings: Bruising and erythema present  Comments: Surgical dressing on right knee, CDI   Neurological:      General: No focal deficit present  Mental Status: She is alert and oriented to person, place, and time  Psychiatric:         Mood and Affect: Mood normal          Behavior: Behavior normal          Thought Content: Thought content normal          Judgment: Judgment normal          Additional Data:     Lab Results:  Results from last 7 days   Lab Units 04/10/22  0748   WBC Thousand/uL 10 47*   HEMOGLOBIN g/dL 11 9   HEMATOCRIT % 36 5   PLATELETS Thousands/uL 222   NEUTROS PCT % 75   LYMPHS PCT % 15   MONOS PCT % 9   EOS PCT % 0     Results from last 7 days   Lab Units 04/10/22  0748   SODIUM mmol/L 131*   POTASSIUM mmol/L 4 1   CHLORIDE mmol/L 94*   CO2 mmol/L 29   BUN mg/dL 35*   CREATININE mg/dL 0 97   ANION GAP mmol/L 8   CALCIUM mg/dL 9 1   ALBUMIN g/dL 3 6   TOTAL BILIRUBIN mg/dL 1 13*   ALK PHOS U/L 63   ALT U/L 31   AST U/L 23   GLUCOSE RANDOM mg/dL 102     Results from last 7 days   Lab Units 04/10/22  0748   INR  1 47*             Results from last 7 days   Lab Units 04/10/22  0748   LACTIC ACID mmol/L 1 2       Imaging: Reviewed radiology reports from this admission including: Right knee x-ray  XR knee 4+ views Right injury   Final Result by Dasia Abrams MD (04/10 1217)      No acute osseous abnormality  Workstation performed: [id number]         VAS lower limb venous duplex study, unilateral/limited    (Results Pending)       EKG and Other Studies Reviewed on Admission:   · EKG: No EKG obtained  Controlled atrial fibrillation    ** Please Note: This note has been constructed using a voice recognition system   **

## 2022-04-10 NOTE — ASSESSMENT & PLAN NOTE
Patient has a history of peripheral neuropathy, uses gabapentin 800 mg 4 times daily  Will continue

## 2022-04-11 LAB
ANION GAP SERPL CALCULATED.3IONS-SCNC: 12 MMOL/L (ref 4–13)
ATRIAL RATE: 90 BPM
BUN SERPL-MCNC: 31 MG/DL (ref 5–25)
CALCIUM SERPL-MCNC: 9.2 MG/DL (ref 8.3–10.1)
CHLORIDE SERPL-SCNC: 99 MMOL/L (ref 100–108)
CO2 SERPL-SCNC: 28 MMOL/L (ref 21–32)
CREAT SERPL-MCNC: 1 MG/DL (ref 0.6–1.3)
ERYTHROCYTE [DISTWIDTH] IN BLOOD BY AUTOMATED COUNT: 14.6 % (ref 11.6–15.1)
GFR SERPL CREATININE-BSD FRML MDRD: 53 ML/MIN/1.73SQ M
GLUCOSE P FAST SERPL-MCNC: 85 MG/DL (ref 65–99)
GLUCOSE SERPL-MCNC: 85 MG/DL (ref 65–140)
HCT VFR BLD AUTO: 37.5 % (ref 34.8–46.1)
HGB BLD-MCNC: 12.4 G/DL (ref 11.5–15.4)
INR PPP: 1.68 (ref 0.84–1.19)
INR PPP: 1.86 (ref 0.84–1.19)
MCH RBC QN AUTO: 30.2 PG (ref 26.8–34.3)
MCHC RBC AUTO-ENTMCNC: 33.1 G/DL (ref 31.4–37.4)
MCV RBC AUTO: 92 FL (ref 82–98)
MRSA NOSE QL CULT: NORMAL
PLATELET # BLD AUTO: 230 THOUSANDS/UL (ref 149–390)
PMV BLD AUTO: 10.2 FL (ref 8.9–12.7)
POTASSIUM SERPL-SCNC: 4 MMOL/L (ref 3.5–5.3)
PROTHROMBIN TIME: 19.3 SECONDS (ref 11.6–14.5)
PROTHROMBIN TIME: 20.9 SECONDS (ref 11.6–14.5)
QRS AXIS: 4 DEGREES
QRSD INTERVAL: 100 MS
QT INTERVAL: 398 MS
QTC INTERVAL: 462 MS
RBC # BLD AUTO: 4.1 MILLION/UL (ref 3.81–5.12)
SODIUM SERPL-SCNC: 139 MMOL/L (ref 136–145)
T WAVE AXIS: -34 DEGREES
VENTRICULAR RATE: 81 BPM
WBC # BLD AUTO: 8.52 THOUSAND/UL (ref 4.31–10.16)

## 2022-04-11 PROCEDURE — 99232 SBSQ HOSP IP/OBS MODERATE 35: CPT | Performed by: PHYSICIAN ASSISTANT

## 2022-04-11 PROCEDURE — 93010 ELECTROCARDIOGRAM REPORT: CPT | Performed by: INTERNAL MEDICINE

## 2022-04-11 PROCEDURE — 97530 THERAPEUTIC ACTIVITIES: CPT

## 2022-04-11 PROCEDURE — 99231 SBSQ HOSP IP/OBS SF/LOW 25: CPT | Performed by: ORTHOPAEDIC SURGERY

## 2022-04-11 PROCEDURE — 85610 PROTHROMBIN TIME: CPT | Performed by: PHYSICIAN ASSISTANT

## 2022-04-11 PROCEDURE — 97166 OT EVAL MOD COMPLEX 45 MIN: CPT

## 2022-04-11 PROCEDURE — 85027 COMPLETE CBC AUTOMATED: CPT | Performed by: NURSE PRACTITIONER

## 2022-04-11 PROCEDURE — 97535 SELF CARE MNGMENT TRAINING: CPT

## 2022-04-11 PROCEDURE — 97163 PT EVAL HIGH COMPLEX 45 MIN: CPT

## 2022-04-11 PROCEDURE — 80048 BASIC METABOLIC PNL TOTAL CA: CPT | Performed by: NURSE PRACTITIONER

## 2022-04-11 PROCEDURE — 85610 PROTHROMBIN TIME: CPT | Performed by: NURSE PRACTITIONER

## 2022-04-11 RX ORDER — WARFARIN SODIUM 7.5 MG/1
7.5 TABLET ORAL DAILY
Status: DISCONTINUED | OUTPATIENT
Start: 2022-04-12 | End: 2022-04-14 | Stop reason: HOSPADM

## 2022-04-11 RX ADMIN — PRAMIPEXOLE DIHYDROCHLORIDE 0.5 MG: 0.5 TABLET ORAL at 21:25

## 2022-04-11 RX ADMIN — METOPROLOL TARTRATE 75 MG: 50 TABLET, FILM COATED ORAL at 21:25

## 2022-04-11 RX ADMIN — CEFAZOLIN SODIUM 2000 MG: 2 SOLUTION INTRAVENOUS at 12:13

## 2022-04-11 RX ADMIN — OXYCODONE HYDROCHLORIDE 5 MG: 5 TABLET ORAL at 08:33

## 2022-04-11 RX ADMIN — GABAPENTIN 800 MG: 400 CAPSULE ORAL at 21:25

## 2022-04-11 RX ADMIN — HYDROMORPHONE HYDROCHLORIDE 0.5 MG: 1 INJECTION, SOLUTION INTRAMUSCULAR; INTRAVENOUS; SUBCUTANEOUS at 14:13

## 2022-04-11 RX ADMIN — GABAPENTIN 800 MG: 400 CAPSULE ORAL at 08:34

## 2022-04-11 RX ADMIN — Medication 250 MG: at 17:34

## 2022-04-11 RX ADMIN — MAGNESIUM OXIDE TAB 400 MG (241.3 MG ELEMENTAL MG) 400 MG: 400 (241.3 MG) TAB at 17:34

## 2022-04-11 RX ADMIN — HYDROMORPHONE HYDROCHLORIDE 0.5 MG: 1 INJECTION, SOLUTION INTRAMUSCULAR; INTRAVENOUS; SUBCUTANEOUS at 06:04

## 2022-04-11 RX ADMIN — GABAPENTIN 800 MG: 400 CAPSULE ORAL at 17:34

## 2022-04-11 RX ADMIN — CEFAZOLIN SODIUM 2000 MG: 2 SOLUTION INTRAVENOUS at 19:55

## 2022-04-11 RX ADMIN — WARFARIN SODIUM 7.5 MG: 7.5 TABLET ORAL at 08:34

## 2022-04-11 RX ADMIN — ACETAMINOPHEN 975 MG: 325 TABLET, FILM COATED ORAL at 21:25

## 2022-04-11 RX ADMIN — METOPROLOL TARTRATE 75 MG: 50 TABLET, FILM COATED ORAL at 12:14

## 2022-04-11 RX ADMIN — CEFAZOLIN SODIUM 2000 MG: 2 SOLUTION INTRAVENOUS at 03:00

## 2022-04-11 RX ADMIN — LORATADINE 10 MG: 10 TABLET ORAL at 08:33

## 2022-04-11 RX ADMIN — OXYCODONE HYDROCHLORIDE 5 MG: 5 TABLET ORAL at 17:34

## 2022-04-11 RX ADMIN — DOCUSATE SODIUM 100 MG: 100 CAPSULE ORAL at 08:34

## 2022-04-11 RX ADMIN — Medication 250 MG: at 08:33

## 2022-04-11 RX ADMIN — PRAMIPEXOLE DIHYDROCHLORIDE 0.5 MG: 0.5 TABLET ORAL at 17:34

## 2022-04-11 RX ADMIN — ACETAMINOPHEN 975 MG: 325 TABLET, FILM COATED ORAL at 06:04

## 2022-04-11 RX ADMIN — ACETAMINOPHEN 975 MG: 325 TABLET, FILM COATED ORAL at 14:14

## 2022-04-11 RX ADMIN — B-COMPLEX W/ C & FOLIC ACID TAB 1 TABLET: TAB at 08:33

## 2022-04-11 RX ADMIN — CALCIUM ACETATE 667 MG: 667 CAPSULE ORAL at 08:34

## 2022-04-11 RX ADMIN — MAGNESIUM OXIDE TAB 400 MG (241.3 MG ELEMENTAL MG) 400 MG: 400 (241.3 MG) TAB at 08:34

## 2022-04-11 RX ADMIN — DOCUSATE SODIUM 100 MG: 100 CAPSULE ORAL at 21:25

## 2022-04-11 RX ADMIN — GABAPENTIN 800 MG: 400 CAPSULE ORAL at 12:12

## 2022-04-11 NOTE — ASSESSMENT & PLAN NOTE
· Patient has a history of atrial fibrillation, status post pacemaker insertion  · Home regimen: lisinopril 2 5 mg daily, Lopressor 75 mg p o  B i d  and Coumadin 7 5 mg daily  Continue  · INR has been subtherapeutic, today at 1 68  · Patient reports she typically takes Coumadin at night, has been given in the morning with INR levels being checked prior to administration  · Will check INR tonight, resume home Coumadin dosing for evening on 04/12, will add additional dose tonight if needed  · Rate control 80 to 90s  · Denies any chest discomfort or palpitations  · Vital signs as per unit routine    · Monitor, Daily INR

## 2022-04-11 NOTE — PHYSICAL THERAPY NOTE
PHYSICAL THERAPY EVALUATION/TREATMENT     04/11/22 1001   Note Type   Note type Evaluation   Pain Assessment   Pain Assessment Tool 0-10   Pain Score 5   Pain Location/Orientation Orientation: Right  (lower leg)   Restrictions/Precautions   Other Precautions Pain   Home Living   Type of Home House   Home Layout Two level; Able to live on main level with bedroom/bathroom  (2-3 BREEZY)   Home Equipment Walker   Prior Function   Level of [place] Independent with ADLs and functional mobility   Lives With Spouse   [facility]ce[facility]   General   Additional Pertinent History Pt is 2 weeks s/p R TKA now with cellulitis of R lower leg  Pt reports the pain and swelling have improved since admission  Cognition   Overall Cognitive Status WFL   Subjective   Subjective "I would love to get OOB"   RLE Assessment   RLE Assessment   (R knee ROM 0-50, MMT hip 3-/5, knee 2+/5, ankle 4/5)   LLE Assessment   LLE Assessment   (ROM WFL, MMT 4/5)   Bed Mobility   Supine to Sit 4  Minimal assistance   Additional items LE management;Verbal cues   Transfers   Sit to Stand 5  Supervision   Stand to Sit 5  Supervision   Stand pivot 5  Supervision   Additional items   (with walker)   Ambulation/Elevation   Gait pattern   (stiff legged R LE gait)   Gait Assistance 5  Supervision   Assistive Device Rolling walker   Distance 150 feet   Balance   Static Sitting Fair +   Dynamic Sitting Fair +   Static Standing Fair +   Dynamic Standing Fair +   Ambulatory Fair +   Activity Tolerance   Activity Tolerance Patient tolerated treatment well   Assessment   Prognosis Good   Problem List Decreased strength;Decreased range of motion;Decreased mobility;Pain;Obesity; Decreased skin integrity   Assessment Patient seen for Physical Therapy evaluation  Patient admitted with Cellulitis of right lower extremity  Comorbidities affecting patient's physical performance include: OA B knees, polyneuropathy, afib, obesity, DM    Personal factors affecting patient at time of initial evaluation include: lives in 2 story house, ambulating with assistive device and stairs to enter home  Prior to admission, patient was independent with functional mobility with walker since TKA 2 weeks ago  Please find objective findings from Physical Therapy assessment regarding body systems outlined above with impairments and limitations including weakness, gait deviations, pain, decreased activity tolerance and decreased functional mobility tolerance  The Barthel Index was used as a functional outcome tool presenting with a score of Barthel Index Score: 65 today indicating moderate limitations of functional mobility and ADLS  Patient's clinical presentation is currently unstable/unpredictable as seen in patient's presentation of changing level of pain, new onset of impairment of functional mobility and new onset of weakness  Pt would benefit from continued Physical Therapy treatment to address deficits as defined above and maximize level of functional mobility  As demonstrated by objective findings, the assigned level of complexity for this evaluation is high  The patient's -[facility] Basic Mobility Inpatient Short Form Raw Score is 22  A Raw score of greater than 16 suggests the patient may benefit from discharge to home  Goals   Patient Goals "knee replacement healing"   STG Expiration Date 04/18/22   Short Term Goal #1 independent bed mobilty, independent transfers, independent ambulation with a walker 150 feet with a normal gait pattern indoor level surfaces, improve R knee ROM to 0-90   LTG Expiration Date 04/25/22   Long Term Goal #1 improve R knee ROM to 0-105, improve R LE strength to at least 4/5   Plan   Treatment/Interventions ADL retraining;Functional transfer training;LE strengthening/ROM; Elevations; Therapeutic exercise;Gait training;Bed mobility; Equipment eval/education;Patient/family training   PT Frequency Other (Comment)  (5w)   Recommendation   PT Discharge Recommendation Home with outpatient rehabilitation when cleared by ortho   Equipment Recommended   (pt has a walker)   AM-PAC Basic Mobility Inpatient   Turning in Bed Without Bedrails 3   Lying on Back to Sitting on Edge of Flat Bed 3   Moving Bed to Chair 4   Standing Up From Chair 4   Walk in Room 4   Climb 3-5 Stairs 4   Basic Mobility Inpatient Raw Score 22   Basic Mobility Standardized Score 47 4   Highest Level Of Mobility   JH-HLM Goal 7: Walk 25 feet or more   JH-HLM Highest Level of Mobility 7: Walk 25 feet or more   JH-HLM Goal Achieved Yes   Barthel Index   Feeding 10   Bathing 0   Grooming Score 5   Dressing Score 5   Bladder Score 5   Bowels Score 10   Toilet Use Score 5   Transfers (Bed/Chair) Score 10   Mobility (Level Surface) Score 10   Stairs Score 5   Barthel Index Score 65   Additional Treatment Session   Start Time 0950   End Time 1000   Treatment Assessment S:  "It feels good to be OOB" O:  Pt ambulated  x 150 feet with a rolling walker with supervision  Pt educated that this PT will follow up after Dr Sigala Points sees pt for R knee ROM/exercise recommendations, however pt did perform x 10 ankle pumps and x 10 gentle R knee flex/ext  A:  Pt demonstrates a steady gait with use of a walker  P:  Continue PT for post op TKA exercise per ortho recommendation   End of Consult   Patient Position at End of Consult All needs within reach;Bed/Chair alarm activated; Bedside chair   Licensure   [place] License Number  Marco SILVESTRE [id number]

## 2022-04-11 NOTE — OCCUPATIONAL THERAPY NOTE
Occupational Therapy Evaluation/Treatment Note       04/11/22 1150   Note Type   Note type Evaluation   Restrictions/Precautions   Other Precautions Pain   Pain Assessment   Pain Assessment Tool 0-10   Pain Score 6   Pain Location/Orientation Orientation: Right;Location: Leg   Home Living   Type of [address] [place] [address] Two level; Able to live on main level with bedroom/bathroom;Stairs to enter with rails  (2-3 BREEZY)   Bathroom Shower/Tub Tub/shower unit   [employer] Standard  (Has been using BSC over toilet)   [address]   Additional Comments Patient is s/p R TKA with Dr Amanda Valle on 3/28; presented to ED with increased pain, redness, and swelling to RLE; dx with cellulitis   Prior Function   Level of [place] Independent with ADLs and functional mobility   Lives With Spouse   Receives Help From Family   ADL Assistance Independent   IADLs Needs assistance   Falls in the last 6 months 0   Comments Patient reports since surgery has been independent in ADLs and mobility with RW; was attending outpatient PT however limited by pain   ADL   Eating Assistance 7  Independent   Grooming Assistance 5  Supervision/Setup   UB Bathing Assistance 5  Supervision/Setup   LB Bathing Assistance 3  Moderate Assistance   UB Dressing Assistance 5  Supervision/Setup   LB Dressing Assistance 3  Moderate Assistance   Toileting Assistance  5  Supervision/Setup   Additional Comments Lower body ADLs limited today by increased R knee pain   Bed Mobility   Additional Comments Not assessed, patient received seated OOB in recliner chair   Transfers   Sit to Stand 5  Supervision   Stand to Sit 5  Supervision   Functional Mobility   Functional Mobility 5  Supervision   Additional Comments Patient ambulated short household distance in room with RW   Balance   Static Sitting Fair +   Dynamic Sitting Fair +   Static Standing Fair +   Dynamic Standing Fair +   Activity Tolerance   Activity Tolerance Patient limited by pain   RUE Assessment   RUE Assessment WFL   LUE Assessment   LUE Assessment WFL   Cognition   Overall Cognitive Status WFL   Arousal/Participation Alert; Cooperative   Attention Within functional limits   Orientation Level Oriented X4   Memory Within functional limits   Following Commands Follows all commands and directions without difficulty   Assessment   Limitation Decreased ADL status; Decreased UE strength;Decreased Safe judgement during ADL;Decreased endurance;Decreased self-care trans;Decreased high-level ADLs   Prognosis Good   Assessment Patient evaluated by Occupational Therapy  Patient admitted with Cellulitis of right lower extremity  The patients occupational profile, medical and therapy history includes a expanded review of medical and/or therapy records and additional review of physical, cognitive, or psychosocial history related to current functional performance  Comorbidities affecting functional mobility and ADLS include: R TKA, HTN, pacemaker, RLS, neuropathy, GERD, obesity, breast cancer, arthritis  Prior to admission, patient was independent with functional mobility with RW, independent with ADLS and requiring assist for IADLS  The evaluation identifies the following performance deficits: weakness, impaired balance, decreased endurance, increased fall risk, new onset of impairment of functional mobility, decreased ADLS, decreased IADLS, pain, decreased activity tolerance, decreased safety awareness, impaired judgement and decreased strength, that result in activity limitations and/or participation restrictions  This evaluation requires clinical decision making of moderate complexity, because the patient may present with comorbidities that affect occupational performance and required minimal or moderate modification of tasks or assistance with the consideration of several treatment options    The Barthel Index was used as a functional outcome tool presenting with a score of Barthel Index Score: 60, indicating moderate limitations of functional mobility and ADLS  The patient's raw score on the AM-PAC Daily Activity inpatient short form is 19, standardized score is 40 22, greater than 39 4  Patients at this level are likely to benefit from DC to home  Please refer to the recommendation of the Occupational Therapist for safe DC planning  Patient will benefit from skilled Occupational Therapy services to address above deficits and facilitate a safe return to prior level of function  Goals   Patient Goals Get rid of pain   STG Time Frame   (1-7 days)   Short Term Goal  Goals established to promote Patient Goals: Get rid of pain:  Patient will increase standing tolerance to 10 minutes during ADL task to decrease assistance level and decrease fall risk; Patient will increase bed mobility to independent in preparation for ADLS and transfers;  Patient will increase functional mobility to and from bathroom with rolling walker independently to increase performance with ADLS and to use a toilet; Patient will tolerate 10 minutes of UE ROM/strengthening to increase general activity tolerance and performance in ADLS/IADLS; Patient will improve functional activity tolerance to 10 minutes of sustained functional tasks to increase participation in basic self-care and decrease assistance level;  Patient will be able to to verbalize understanding and perform energy conservation/proper body mechanics during ADLS and functional mobility at least 75% of the time with minimal cueing to decrease signs of fatigue and increase stamina to return to prior level of function; Patient will increase static/dynamic sitting balance to good to improve the ability to sit at edge of bed or on a chair for ADLS;     LTG Time Frame   (8-14 days)   Long Term Goal Patient will increase standing tolerance to 15 minutes during ADL task to decrease assistance level and decrease fall risk; Patient will increase functional mobility to and from bathroom with single point cane independently to increase performance with ADLS and to use a toilet; Patient will tolerate 15 minutes of UE ROM/strengthening to increase general activity tolerance and performance in ADLS/IADLS; Patient will improve functional activity tolerance to 15 minutes of sustained functional tasks to increase participation in basic self-care and decrease assistance level;  Patient will be able to to verbalize understanding and perform energy conservation/proper body mechanics during ADLS and functional mobility at least 90% of the time with no cueing to decrease signs of fatigue and increase stamina to return to prior level of function; Patient will increase static/dynamic standing balance to good to improve postural stability and decrease fall risk during standing ADLS and transfers  Pt will score >/= 24/24 on AM-PAC Daily Activity Inpatient scale to promote safe independence with ADLs and functional mobility; Pt will score >/= 100/100 on Barthel Index in order to decrease caregiver assistance needed and increase ability to perform ADLs and functional mobility  Functional Transfer Goals   Pt Will Perform All Functional Transfers   (LTG Independent)   ADL Goals   Pt Will Perform Grooming   (LTG Independent)   Pt Will Perform Bathing   (STG min assist, LTG supervision)   Pt Will Perform UE Dressing   (LTG Independent)   Pt Will Perform LE Dressing   (STG min assist, LTG supervision)   Pt Will Perform Toileting   (LTG independent)   Plan   Treatment Interventions ADL retraining;Functional transfer training;UE strengthening/ROM; Endurance training;Patient/family training;Equipment evaluation/education; Compensatory technique education;Continued evaluation; Energy conservation; Activityengagement   Goal Expiration Date 04/25/22   OT Frequency 3-5x/wk   Additional Treatment Session   Start Time 1135   End Time 1150   Treatment Assessment S: "It feels so good to move around" O: Patient ambulated short household distance to/from bathroom with RW and supervision; ambulatory transfer to/from [facility] Over toilet (as patient has set up at home) with RW and supervision; toilet hygiene completed with supervision; hand hygiene, face washing, teeth brushing while standing to sink unsupported independently; pt then requesting to return to supine , stand to sit to EOB with supervision, sit to supine with min assist for RLE management; A: Patient with some pain to R knee but able to complete all ADLs and functional mobility without difficulty, at end of session patient supine in bed with all needs met P: Home w/continued family support, outpatient PT as indicated by ortho   Recommendation   OT Discharge Recommendation No rehabilitation needs   Additional Comments  No OT needs, recommend continued outpatient PT as per ortho MD   AM-PAC Daily Activity Inpatient   Lower Body Dressing 2   Bathing 3   Toileting 3   Upper Body Dressing 3   Grooming 4   Eating 4   Daily Activity Raw Score 19   Daily Activity Standardized Score (Calc for Raw Score >=11) 40 22   AM-PAC Applied Cognition Inpatient   Following a Speech/Presentation 4   Understanding Ordinary Conversation 4   Taking Medications 4   Remembering Where Things Are Placed or Put Away 4   Remembering List of 4-5 Errands 4   Taking Care of Complicated Tasks 4   Applied Cognition Raw Score 24   Applied Cognition Standardized Score 62 21   Barthel Index   Feeding 10   Bathing 0   Grooming Score 0   Dressing Score 5   Bladder Score 5   Bowels Score 10   Toilet Use Score 5   Transfers (Bed/Chair) Score 10   Mobility (Level Surface) Score 10   Stairs Score 5   Barthel Index Score 61   Licensure   [place] License Number  Rafael [email or web address] [id number]

## 2022-04-11 NOTE — PROGRESS NOTES
Progress Note - Orthopedics   Azul Stroud 80 y o  female MRN: [id number]  Unit/Bed#: 2 [facility] 202 Encounter: [id number]    Assessment:  1) Cellulits RLE  2) s/p RA R TKA 3/28    Plan:  On examination today she reports that the pain in her right lower extremity is improving since admission  On my exam today she has a painless arc of motion and her incision appears to be well approximated without signs of infection or drainage  She does have bleeding into the right lower extremity consistent with her anticoagulant use in the setting of her soft tissue envelope of her right lower extremity  She also has superimposed cellulitis which I do feel is mild  The erythema is also improving as per her  At this point I do not feel that there is a deep infection nor is the joint involved  Her leukocytosis is much improved today and she has been afebrile since admission  IV antibiotics as per slim  Continue DVT prophylaxis with Coumadin  Still subtherapeutic and will defer dosing to the internal medicine team  WBAT RLE  Elevate right lower extremity at rest  PT/OT  Analgesia p r n  Will continue to follow while she is an inpatient  Will expect continued resolution of her cellulitis on IV antibiotics  No orthopedic intervention needed at this time    Weight bearing: WBAT RLE    VTE Pharmacologic Prophylaxis: Warfarin (Coumadin)  VTE Mechanical Prophylaxis: sequential compression device    Subjective:  Patient seen examined at bedside with  present  Patient states that the pain in her right lower extremities improving since admission  She states that the erythema is also looking slightly better in addition to the swelling  Vitals: Blood pressure 112/65, pulse 82, temperature 97 9 °F (36 6 °C), resp  rate 22, height 5' 4 5" (1 638 m), weight 103 kg (228 lb), SpO2 97 %  ,Body mass index is 38 53 kg/m²        Intake/Output Summary (Last 24 hours) at 4/11/2022 1556  Last data filed at 4/11/2022 0600  Gross per 24 hour   Intake 560 ml   Output 1100 ml   Net -540 ml       Invasive Devices  Report    Peripheral Intravenous Line            Peripheral IV 04/10/22 Right Antecubital 1 day                Physical Exam: NAD  Ortho Exam: RLE:  On examination there is scattered ecchymosis in various stages of resolution in her right lower extremity  There is also generalized red hue in certain areas of her right lower extremity due to her soft tissue envelope in the setting of her anticoagulant use  She has a painless arc of motion of 0-75 degrees  She is mildly tender around the proximal medial and lateral tibia and to a lesser extent down towards the ankle foot  The swelling appears to be resolving from examination is prior to mind today  All compartments soft compressible  Neurovascular intact  Incision healing well without signs of drainage  Lab, Imaging and other studies:  Venous duplex of the right lower extremity on admission reveals no evidence of deep vein thrombosis  Mateo MELENDEZ    Division of Adult Reconstruction  Department of Orthopaedic Surgery  [facility]

## 2022-04-11 NOTE — ASSESSMENT & PLAN NOTE
· Patient has a history of peripheral neuropathy, uses gabapentin 800 mg 4 times daily  · Will continue

## 2022-04-11 NOTE — ASSESSMENT & PLAN NOTE
· As noted above, patient underwent right total knee replacement on 03/28 with Orthopedics  · Right knee x-ray showed no osseous abnormality as per radiology report  · Patient does have range of motion however limited by pain at this time    · Pain control  · Orthopedic consult

## 2022-04-11 NOTE — PROGRESS NOTES
[place] 47  Progress Note - Breann Drape 1940, 80 y o  female MRN: [id number]  Unit/Bed#: 2 [facility] 202 Encounter: [id number]  Primary Care Provider: Jenny Saldana MD   Date and time admitted to hospital: 4/10/2022  6:58 AM    * Cellulitis of right lower extremity  Assessment & Plan  · Cellulitis of right lower extremity POA as evidence by erythema, warmth , swelling and pain to palpation  · Patient presented to the emergency department secondary to increased redness, pain and low-grade fever at home  In the ED WBCs noted to be mildly elevated at 10 47  · Patient had recently undergone a total right knee replacement on 03/28 with Orthopedics  · Venous duplex was performed, negative for DVT as per discussion with ER provider  · X-ray of right knee showed no osseous abnormality as per radiology report  · Right lower extremity marked for area of erythema  · Orthopedic surgery following, recommendations appreciated  · Patient still with significant erythema/tenderness on right lower extremity, leukocytosis had resolved, afebrile  Would benefit from further IV antibiotics  · Continue IV Ancef 2 g IV q 8 hours  · MRSA culture pending  · Continue with serial leg exams  · Consider ID consultation if cellulitis does not improve    Atrial fibrillation (HCC) [I48 [id number]]  Assessment & Plan  · Patient has a history of atrial fibrillation, status post pacemaker insertion  · Home regimen: lisinopril 2 5 mg daily, Lopressor 75 mg p o  B i d  and Coumadin 7 5 mg daily  Continue  · INR has been subtherapeutic, today at 1 68  · Patient reports she typically takes Coumadin at night, has been given in the morning with INR levels being checked prior to administration  · Will check INR tonight, resume home Coumadin dosing for evening on 04/12, will add additional dose tonight if needed  · Rate control 80 to 90s  · Denies any chest discomfort or palpitations    · Vital signs as per unit routine  · Monitor, Daily INR    Status post total knee replacement using cement, right  Assessment & Plan  · As noted above, patient underwent right total knee replacement on 03/28 with Orthopedics  · Right knee x-ray showed no osseous abnormality as per radiology report  · Patient does have range of motion however limited by pain at this time  · Pain control  · Orthopedic consult    Peripheral neuropathy  Assessment & Plan  · Patient has a history of peripheral neuropathy, uses gabapentin 800 mg 4 times daily  · Will continue  Essential hypertension  Assessment & Plan  · Patient has a history of essential hypertension  · Home regimen: lisinopril 2 5 mg daily, Lopressor 75 mg p o  B i d  · BP has been soft, will discontinue lisinopril and monitor response  · Vital signs as per unit routine  RLS (restless legs syndrome)  Assessment & Plan  · Patient has a history of restless leg syndrome, follows with outpatient Neurology  · Will continue patient's home medication Mirapex 0 5 mg 2 tabs at 5:00 p m  And 10:00 p m  Unk [place] · Patient reports that she gets good relief with this, when she misses a pill then she experiences discomfort  VTE Pharmacologic Prophylaxis: VTE Score: 10 High Risk (Score >/= 5) - Pharmacological DVT Prophylaxis Ordered: warfarin (Coumadin)  Sequential Compression Devices Ordered  Patient Centered Rounds: I performed bedside rounds with nursing staff today  Discussions with Specialists or Other Care Team Provider:  None      Education and Discussions with Family / Patient: Updated  (son) via phone  Time Spent for Care: 45 minutes  More than 50% of total time spent on counseling and coordination of care as described above      Current Length of Stay: 1 day(s)  Current Patient Status: Inpatient   Certification Statement: The patient will continue to require additional inpatient hospital stay due to Improvement in right lower extremity cellulitis, further IV antibiotics, pending MRSA culture  Discharge Plan: Anticipate discharge in 24-48 hrs to discharge location to be determined pending rehab evaluations  Code Status: Level 1 - Full Code    Subjective:   Patient is seen at bedside this a m , reports that swelling in right leg is improved, although not much improvement in tenderness and erythema, still with difficulty with bending knee due to pain  Denies fever, chills, sweats, nausea/vomiting, diarrhea  Objective:     Vitals:   Temp (24hrs), Av 9 °F (36 6 °C), Min:97 6 °F (36 4 °C), Max:98 2 °F (36 8 °C)    Temp:  [97 6 °F (36 4 °C)-98 2 °F (36 8 °C)] 97 6 °F (36 4 °C)  HR:  [78-95] 93  Resp:  [16-20] 17  BP: ()/(54-77) 107/60  SpO2:  [92 %-97 %] 92 %  Body mass index is 38 53 kg/m²  Input and Output Summary (last 24 hours): Intake/Output Summary (Last 24 hours) at 2022 1051  Last data filed at 2022 0600  Gross per 24 hour   Intake 560 ml   Output 1100 ml   Net -540 ml       Physical Exam:   Physical Exam  Constitutional:       General: She is not in acute distress  Appearance: Normal appearance  She is not ill-appearing, toxic-appearing or diaphoretic  Cardiovascular:      Rate and Rhythm: Normal rate and regular rhythm  Pulses: Normal pulses  Heart sounds: Normal heart sounds  Pulmonary:      Effort: Pulmonary effort is normal  No respiratory distress  Breath sounds: Normal breath sounds  Abdominal:      General: Bowel sounds are normal  There is no distension  Palpations: Abdomen is soft  Tenderness: There is no abdominal tenderness  Musculoskeletal:         General: Swelling and tenderness present  Comments: Significant tenderness to palpation of right lower extremity, even with light sensation  Mild, nonpitting right lower extremity edema  Skin:     General: Skin is warm  Findings: Erythema present        Comments: Marked erythema and increased warmth on right lower extremity, without extension past initial markings on leg in ED  No red streaking or open wounds  Neurological:      General: No focal deficit present  Mental Status: She is alert and oriented to person, place, and time  Psychiatric:         Mood and Affect: Mood normal          Behavior: Behavior normal           Additional Data:     Labs:  Results from last 7 days   Lab Units 04/11/22  0618 04/10/22  0748 04/10/22  0748   WBC Thousand/uL 8 52   < > 10 47*   HEMOGLOBIN g/dL 12 4   < > 11 9   HEMATOCRIT % 37 5   < > 36 5   PLATELETS Thousands/uL 230   < > 222   NEUTROS PCT %  --   --  75   LYMPHS PCT %  --   --  15   MONOS PCT %  --   --  9   EOS PCT %  --   --  0    < > = values in this interval not displayed  Results from last 7 days   Lab Units 04/11/22  0618 04/10/22  0748 04/10/22  0748   SODIUM mmol/L 139   < > 131*   POTASSIUM mmol/L 4 0   < > 4 1   CHLORIDE mmol/L 99*   < > 94*   CO2 mmol/L 28   < > 29   BUN mg/dL 31*   < > 35*   CREATININE mg/dL 1 00   < > 0 97   ANION GAP mmol/L 12   < > 8   CALCIUM mg/dL 9 2   < > 9 1   ALBUMIN g/dL  --   --  3 6   TOTAL BILIRUBIN mg/dL  --   --  1 13*   ALK PHOS U/L  --   --  63   ALT U/L  --   --  31   AST U/L  --   --  23   GLUCOSE RANDOM mg/dL 85   < > 102    < > = values in this interval not displayed  Results from last 7 days   Lab Units 04/11/22 0618   INR  1 68*             Results from last 7 days   Lab Units 04/10/22  0748   LACTIC ACID mmol/L 1 2       Lines/Drains:  Invasive Devices  Report    Peripheral Intravenous Line            Peripheral IV 04/10/22 Right Antecubital 1 day                      Imaging: Reviewed radiology reports from this admission including: xray(s) and ultrasound(s)    Recent Cultures (last 7 days):   Results from last 7 days   Lab Units 04/10/22  0952 04/10/22  0748   BLOOD CULTURE  Received in Microbiology Lab  Culture in Progress  Received in Microbiology Lab  Culture in Progress         Last 24 Hours Medication List: Current Facility-Administered Medications   Medication Dose Route Frequency Provider Last Rate    acetaminophen  975 mg Oral [facility], CRNP      calcium acetate  667 mg Oral Daily [facility], CR[facility]      cefazolin  2,000 mg Intravenous M8I [facility], CRNP 2,000 mg (04/11/22 0300)    diphenhydrAMINE  25 mg Intravenous Q6H PRN [facility], CRNP      docusate sodium  100 mg Oral BID [facility], CRNP      famotidine  20 mg Oral HS PRN [facility], CRNP      fluticasone  1 spray Each Nare Daily [facility], CRNP      furosemide  40 mg Oral Daily [facility], CRNP      gabapentin  800 mg Oral 4x Daily [facility], CRNP      HYDROmorphone  0 5 mg Intravenous Q4H PRN [facility], CRNP      loratadine  10 mg Oral Daily [facility], CRNP      magnesium oxide  400 mg Oral BID [facility], CRNP      metoprolol tartrate  75 mg Oral BID [facility], CRNP      multivitamin stress formula  1 tablet Oral Daily [facility], CRNP      ondansetron  4 mg Intravenous Q6H PRN [facility], CRNP      oxyCODONE  5 mg Oral Q6H PRN [facility], CRNP      pramipexole  0 5 mg Oral BID [facility], CRNP      saccharomyces boulardii  250 mg Oral BID [facility], CRNP      [START ON 4/12/2022] warfarin  7 5 mg Oral Daily Lg Romero PA-C          Today, Patient Was Seen By: Lg Romero PA-C    **Please Note: This note may have been constructed using a voice recognition system  **

## 2022-04-11 NOTE — CASE MANAGEMENT
Case Management Assessment & Discharge Planning Note    Patient name Breann Vilchis  Location [address] MRN [id number]  : 1940 Date 2022       Current Admission Date: 4/10/2022  Current Admission Diagnosis:Cellulitis of right lower extremity   Patient Active Problem List    Diagnosis Date Noted    Cellulitis of right lower extremity 04/10/2022    Status post total knee replacement using cement, right 2022    Chronic kidney disease (CKD), stage II (mild) 10/06/2021    Exotropia of right eye 2021    Seasonal allergies 2021    Leg swelling 2020    Status post total knee replacement using cement, left     Lichen sclerosus et atrophicus of the vulva 2020    Colon polyps 2019    Pain in both feet 2019    Chronic edema 2019    Deep venous insufficiency 2019    Pacemaker     GERD (gastroesophageal reflux disease)     Mild cognitive impairment 2018    Vitamin D deficiency 2018    Carrero's esophagus with dysplasia 2018    Sensory polyneuropathy 2018    RLS (restless legs syndrome) 2018    Primary osteoarthritis of both knees 2018    Acquired deformity of foot 2018    Corns 2018    Diabetic polyneuropathy associated with type 2 diabetes mellitus ([address] ) 2018    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[facility] ) 2018    Radiculopathy of lumbar region 2018    Atrial fibrillation ([address] ) [I48 91] 2018    Dense breast tissue on mammogram 2017    Difficulty walking 2017    Flat foot 2017    Onychomycosis 2017    Hiatal hernia     Multiple lung nodules 2015    Severe obesity (BMI 35 0-39  9) with comorbidity ([address] ) 2014    Osteopenia of multiple sites 2014    Arthritis 2014    Essential hypertension     Lichen sclerosus et atrophicus 2013    Peripheral neuropathy 2013      LOS (days): 1  Geometric Mean LOS (GMLOS) (days): 3 20  Days to GMLOS:3     OBJECTIVE:    Risk of Unplanned Readmission Score: 15     Current admission status: Inpatient    Preferred Pharmacy:   [address] - [facility], [address] [place] [address] ERIC Lozano [address] [place], [place] [id number]  Phone: [phone] Fax: [phone]    Primary Care Provider: Lashawn Guerrero MD    Primary Insurance: MEDICARE  Secondary Insurance: COMMERCIAL MISCELLANEOUS    ASSESSMENT:  [address]     Angel, [address] - Spouse   Primary Phone: [phone] (Mobile)  Home Phone: [phone]               Advance Directives  Does patient have a [address] [facility] [address]?: Yes (requested copy)  Does patient have Advance Directives?: Yes  Advance Directives: Living will,Power of  for health care  Primary Contact: Shelby Woodard    Readmission Root Cause  30 Day Readmission: No    Patient Information  Admitted from[de-identified] Home  Mental Status: Alert  During Assessment patient was accompanied by: Spouse  Assessment information provided by[de-identified] Patient,Spouse  Primary Caregiver: Self  Support Systems: Spouse/significant other,Son  [place] of Residence: [address] [place] [address] do you live in?: [place] [address] entry access options   Select all that apply : Stairs  Number of steps to enter home : 3  Do the steps have railings?: Yes  Type of Current Residence: [address] [place] [address] home  Upon entering residence, is there a bedroom on the main floor (no further steps)?: Yes  Upon entering residence, is there a bathroom on the main floor (no further steps)?: Yes  In the last 12 months, was there a time when you were not able to pay the mortgage or rent on time?: No  In the last 12 months, how many places have you lived?: 1  In the last 12 months, was there a time when you did not have a steady place to sleep or slept in a shelter (including now)?: No  Homeless/housing insecurity resource given?: N/A  Living Arrangements: Lives w/ Spouse/significant other    Activities of Daily Living Prior to Admission  Completes ADLs independently?: Yes  Ambulates independently?: Yes  Does patient use assisted devices?: Yes  Assisted Devices (DME) used: Walker,Straight Cane,Bedside Commode  Does patient currently own DME?: Yes  What DME does the patient currently own?: Bedside Commode,Straight Cane,Walker  Does patient have a history of Outpatient Therapy (PT/OT)?: Yes (attended PT twice since surgery 3/28/22)  Does the patient have a history of Short-Term Rehab?: No  Does patient have a history of HHC?: No  Does patient currently have [address]?: No    Patient Information Continued  Income Source: Pension/alf  Does patient have prescription coverage?: Yes ([employer])  Within the past 12 months, you worried that your food would run out before you got the money to buy more : Never true  Within the past 12 months, the food you bought just didnt last and you didnt have money to get more : Never true  Food insecurity resource given?: N/A  Does patient receive dialysis treatments?: No  Does patient have a history of substance abuse?: No  Does patient have a history of Mental Health Diagnosis?: No    Means of Transportation  Means of Transport to Appts[de-identified] Family transport  In the past 12 months, has lack of transportation kept you from medical appointments or from getting medications?: No  In the past 12 months, has lack of transportation kept you from meetings, work, or from getting things needed for daily living?: No  Was application for public transport provided?: N/A      DISCHARGE DETAILS:    Discharge planning discussed with[de-identified] Patient and , John Connolly of Choice: Yes  Comments - Freedom of Choice: SW met with pt and  to assess needs and discuss plans  Pt had knee surgery on 3/28/22and had returned home with outpatient therapy  Pt said she was able to get to therapy twice before having to return with cellulitis  Pt's plan at this time is to return home and resume outpatient therapy when discharged  SW offered assistance with planning if needed  SW will follow to monitor progress  CM contacted family/caregiver?: Yes  Were Treatment Team discharge recommendations reviewed with patient/caregiver?: Yes  Did patient/caregiver verbalize understanding of patient care needs?: Yes  Were patient/caregiver advised of the risks associated with not following Treatment Team discharge recommendations?: Yes    Contacts  Patient Contacts: Aileen Berry  Relationship to Patient[de-identified] Family ()  Contact Method: In Person  Reason/Outcome: Discharge [address]         Is the patient interested in [facility] at discharge?: No    DME Referral Provided  Referral made for DME?: No    Other Referral/Resources/Interventions Provided:  Interventions: None Indicated  Referral Comments: Will follow to monitor needs/progress      Treatment Team Recommendation: Home (with outpatient therapy)  Discharge Destination Plan[de-identified] Home (with outpatient therapy)  Transport at Discharge : [facility]

## 2022-04-11 NOTE — ASSESSMENT & PLAN NOTE
· Patient has a history of essential hypertension  · Home regimen: lisinopril 2 5 mg daily, Lopressor 75 mg p o  B i d  · BP has been soft, will discontinue lisinopril and monitor response  · Vital signs as per unit routine

## 2022-04-11 NOTE — ASSESSMENT & PLAN NOTE
· Cellulitis of right lower extremity POA as evidence by erythema, warmth , swelling and pain to palpation  · Patient presented to the emergency department secondary to increased redness, pain and low-grade fever at home  In the ED WBCs noted to be mildly elevated at 10 47  · Patient had recently undergone a total right knee replacement on 03/28 with Orthopedics  · Venous duplex was performed, negative for DVT as per discussion with ER provider  · X-ray of right knee showed no osseous abnormality as per radiology report  · Right lower extremity marked for area of erythema  · Orthopedic surgery following, recommendations appreciated  · Patient still with significant erythema/tenderness on right lower extremity, leukocytosis had resolved, afebrile  Would benefit from further IV antibiotics  · Continue IV Ancef 2 g IV q 8 hours  · MRSA culture pending  · Continue with serial leg exams    · Consider ID consultation if cellulitis does not improve

## 2022-04-11 NOTE — PLAN OF CARE
Problem: Potential for Falls  Goal: Patient will remain free of falls  Description: INTERVENTIONS:  - Educate patient/family on patient safety including physical limitations  - Instruct patient to call for assistance with activity   - Consult OT/PT to assist with strengthening/mobility   - Keep Call bell within reach  - Keep bed low and locked with side rails adjusted as appropriate  - Keep care items and personal belongings within reach  - Initiate and maintain comfort rounds  - Make Fall Risk Sign visible to staff  - Offer Toileting every 2 Hours, in advance of need  - Initiate/Maintain bed alarm  - Obtain necessary fall risk management equipment:   - Apply yellow socks and bracelet for high fall risk patients  Outcome: Progressing     Problem: MOBILITY - ADULT  Goal: Maintain or return to baseline ADL function  Description: INTERVENTIONS:  -  Assess patient's ability to carry out ADLs; assess patient's baseline for ADL function and identify physical deficits which impact ability to perform ADLs (bathing, care of mouth/teeth, toileting, grooming, dressing, etc )  - Assess/evaluate cause of self-care deficits   - Assess range of motion  - Assess patient's mobility; develop plan if impaired  - Assess patient's need for assistive devices and provide as appropriate  - Encourage maximum independence but intervene and supervise when necessary  - Involve family in performance of ADLs  - Assess for home care needs following discharge   - Consider OT consult to assist with ADL evaluation and planning for discharge  - Provide patient education as appropriate  Outcome: Progressing     Problem: PAIN - ADULT  Goal: Verbalizes/displays adequate comfort level or baseline comfort level  Description: Interventions:  - Encourage patient to monitor pain and request assistance  - Assess pain using appropriate pain scale  - Administer analgesics based on type and severity of pain and evaluate response  - Implement non-pharmacological measures as appropriate and evaluate response  - Consider cultural and social influences on pain and pain management  - Notify physician/advanced practitioner if interventions unsuccessful or patient reports new pain  Outcome: Progressing

## 2022-04-11 NOTE — ASSESSMENT & PLAN NOTE
· Patient has a history of restless leg syndrome, follows with outpatient Neurology  · Will continue patient's home medication Mirapex 0 5 mg 2 tabs at 5:00 p m  And 10:00 p m  Mike [place] · Patient reports that she gets good relief with this, when she misses a pill then she experiences discomfort

## 2022-04-11 NOTE — PLAN OF CARE
Problem: Potential for Falls  Goal: Patient will remain free of falls  Description: INTERVENTIONS:  - Educate patient/family on patient safety including physical limitations  - Instruct patient to call for assistance with activity   - Consult OT/PT to assist with strengthening/mobility   - Keep Call bell within reach  - Keep bed low and locked with side rails adjusted as appropriate  - Keep care items and personal belongings within reach  - Initiate and maintain comfort rounds  - Make Fall Risk Sign visible to staff  - Offer Toileting every 2 Hours, in advance of need  - Initiate/Maintain bed alarm  - Obtain necessary fall risk management equipment:   - Apply yellow socks and bracelet for high fall risk patients  Outcome: Progressing     Problem: MOBILITY - ADULT  Goal: Maintain or return to baseline ADL function  Description: INTERVENTIONS:  -  Assess patient's ability to carry out ADLs; assess patient's baseline for ADL function and identify physical deficits which impact ability to perform ADLs (bathing, care of mouth/teeth, toileting, grooming, dressing, etc )  - Assess/evaluate cause of self-care deficits   - Assess range of motion  - Assess patient's mobility; develop plan if impaired  - Assess patient's need for assistive devices and provide as appropriate  - Encourage maximum independence but intervene and supervise when necessary  - Involve family in performance of ADLs  - Assess for home care needs following discharge   - Consider OT consult to assist with ADL evaluation and planning for discharge  - Provide patient education as appropriate  Outcome: Progressing  Goal: Maintains/Returns to pre admission functional level  Description: INTERVENTIONS:  - Perform BMAT or MOVE assessment daily    - Set and communicate daily mobility goal to care team and patient/family/caregiver  - Collaborate with rehabilitation services on mobility goals if consulted  - Perform Range of Motion 2 times a day    - Reposition patient every 3 hours    - Dangle patient 2 times a day  - Stand patient 2 times a day  - Ambulate patient 3 times a day  - Out of bed to chair 2 times a day   - Out of bed for meals 2 times a day  - Out of bed for toileting  - Record patient progress and toleration of activity level   Outcome: Progressing       Problem: MUSCULOSKELETAL - ADULT  Goal: Maintain or return mobility to safest level of function  Description: INTERVENTIONS:  - Assess patient's ability to carry out ADLs; assess patient's baseline for ADL function and identify physical deficits which impact ability to perform ADLs (bathing, care of mouth/teeth, toileting, grooming, dressing, etc )  - Assess/evaluate cause of self-care deficits   - Assess range of motion  - Assess patient's mobility  - Assess patient's need for assistive devices and provide as appropriate  - Encourage maximum independence but intervene and supervise when necessary  - Involve family in performance of ADLs  - Assess for home care needs following discharge   - Consider OT consult to assist with ADL evaluation and planning for discharge  - Provide patient education as appropriate  Outcome: Progressing

## 2022-04-12 LAB
ANION GAP SERPL CALCULATED.3IONS-SCNC: 7 MMOL/L (ref 4–13)
BASOPHILS # BLD AUTO: 0.05 THOUSANDS/ΜL (ref 0–0.1)
BASOPHILS NFR BLD AUTO: 1 % (ref 0–1)
BUN SERPL-MCNC: 26 MG/DL (ref 5–25)
CALCIUM SERPL-MCNC: 9 MG/DL (ref 8.3–10.1)
CHLORIDE SERPL-SCNC: 100 MMOL/L (ref 100–108)
CO2 SERPL-SCNC: 30 MMOL/L (ref 21–32)
CREAT SERPL-MCNC: 1.01 MG/DL (ref 0.6–1.3)
EOSINOPHIL # BLD AUTO: 0.01 THOUSAND/ΜL (ref 0–0.61)
EOSINOPHIL NFR BLD AUTO: 0 % (ref 0–6)
ERYTHROCYTE [DISTWIDTH] IN BLOOD BY AUTOMATED COUNT: 14.6 % (ref 11.6–15.1)
GFR SERPL CREATININE-BSD FRML MDRD: 53 ML/MIN/1.73SQ M
GLUCOSE SERPL-MCNC: 97 MG/DL (ref 65–140)
HCT VFR BLD AUTO: 35.8 % (ref 34.8–46.1)
HGB BLD-MCNC: 11.6 G/DL (ref 11.5–15.4)
IMM GRANULOCYTES # BLD AUTO: 0.03 THOUSAND/UL (ref 0–0.2)
IMM GRANULOCYTES NFR BLD AUTO: 0 % (ref 0–2)
INR PPP: 1.94 (ref 0.84–1.19)
LYMPHOCYTES # BLD AUTO: 1.55 THOUSANDS/ΜL (ref 0.6–4.47)
LYMPHOCYTES NFR BLD AUTO: 18 % (ref 14–44)
MCH RBC QN AUTO: 29.9 PG (ref 26.8–34.3)
MCHC RBC AUTO-ENTMCNC: 32.4 G/DL (ref 31.4–37.4)
MCV RBC AUTO: 92 FL (ref 82–98)
MONOCYTES # BLD AUTO: 0.81 THOUSAND/ΜL (ref 0.17–1.22)
MONOCYTES NFR BLD AUTO: 10 % (ref 4–12)
NEUTROPHILS # BLD AUTO: 6.1 THOUSANDS/ΜL (ref 1.85–7.62)
NEUTS SEG NFR BLD AUTO: 71 % (ref 43–75)
NRBC BLD AUTO-RTO: 0 /100 WBCS
PLATELET # BLD AUTO: 228 THOUSANDS/UL (ref 149–390)
PMV BLD AUTO: 9.7 FL (ref 8.9–12.7)
POTASSIUM SERPL-SCNC: 4.2 MMOL/L (ref 3.5–5.3)
PROTHROMBIN TIME: 21.6 SECONDS (ref 11.6–14.5)
RBC # BLD AUTO: 3.88 MILLION/UL (ref 3.81–5.12)
SODIUM SERPL-SCNC: 137 MMOL/L (ref 136–145)
WBC # BLD AUTO: 8.55 THOUSAND/UL (ref 4.31–10.16)

## 2022-04-12 PROCEDURE — 99232 SBSQ HOSP IP/OBS MODERATE 35: CPT | Performed by: STUDENT IN AN ORGANIZED HEALTH CARE EDUCATION/TRAINING PROGRAM

## 2022-04-12 PROCEDURE — 99024 POSTOP FOLLOW-UP VISIT: CPT | Performed by: ORTHOPAEDIC SURGERY

## 2022-04-12 PROCEDURE — 97530 THERAPEUTIC ACTIVITIES: CPT

## 2022-04-12 PROCEDURE — 85610 PROTHROMBIN TIME: CPT | Performed by: PHYSICIAN ASSISTANT

## 2022-04-12 PROCEDURE — 97110 THERAPEUTIC EXERCISES: CPT

## 2022-04-12 PROCEDURE — 80048 BASIC METABOLIC PNL TOTAL CA: CPT | Performed by: PHYSICIAN ASSISTANT

## 2022-04-12 PROCEDURE — 85025 COMPLETE CBC W/AUTO DIFF WBC: CPT | Performed by: PHYSICIAN ASSISTANT

## 2022-04-12 RX ORDER — HYDROMORPHONE HCL/PF 1 MG/ML
0.5 SYRINGE (ML) INJECTION EVERY 4 HOURS PRN
Status: DISCONTINUED | OUTPATIENT
Start: 2022-04-12 | End: 2022-04-14 | Stop reason: HOSPADM

## 2022-04-12 RX ADMIN — Medication 250 MG: at 17:15

## 2022-04-12 RX ADMIN — OXYCODONE HYDROCHLORIDE 5 MG: 5 TABLET ORAL at 12:34

## 2022-04-12 RX ADMIN — FUROSEMIDE 40 MG: 40 TABLET ORAL at 09:15

## 2022-04-12 RX ADMIN — CALCIUM ACETATE 667 MG: 667 CAPSULE ORAL at 09:15

## 2022-04-12 RX ADMIN — LORATADINE 10 MG: 10 TABLET ORAL at 09:16

## 2022-04-12 RX ADMIN — PRAMIPEXOLE DIHYDROCHLORIDE 0.5 MG: 0.5 TABLET ORAL at 17:15

## 2022-04-12 RX ADMIN — B-COMPLEX W/ C & FOLIC ACID TAB 1 TABLET: TAB at 09:15

## 2022-04-12 RX ADMIN — DOCUSATE SODIUM 100 MG: 100 CAPSULE ORAL at 21:30

## 2022-04-12 RX ADMIN — ACETAMINOPHEN 975 MG: 325 TABLET, FILM COATED ORAL at 05:33

## 2022-04-12 RX ADMIN — GABAPENTIN 800 MG: 400 CAPSULE ORAL at 17:15

## 2022-04-12 RX ADMIN — CEFAZOLIN SODIUM 2000 MG: 2 SOLUTION INTRAVENOUS at 12:34

## 2022-04-12 RX ADMIN — GABAPENTIN 800 MG: 400 CAPSULE ORAL at 09:15

## 2022-04-12 RX ADMIN — GABAPENTIN 800 MG: 400 CAPSULE ORAL at 21:29

## 2022-04-12 RX ADMIN — METOPROLOL TARTRATE 75 MG: 50 TABLET, FILM COATED ORAL at 09:15

## 2022-04-12 RX ADMIN — OXYCODONE HYDROCHLORIDE 5 MG: 5 TABLET ORAL at 00:10

## 2022-04-12 RX ADMIN — PRAMIPEXOLE DIHYDROCHLORIDE 0.5 MG: 0.5 TABLET ORAL at 21:29

## 2022-04-12 RX ADMIN — CEFAZOLIN SODIUM 2000 MG: 2 SOLUTION INTRAVENOUS at 04:49

## 2022-04-12 RX ADMIN — GABAPENTIN 800 MG: 400 CAPSULE ORAL at 12:34

## 2022-04-12 RX ADMIN — MAGNESIUM OXIDE TAB 400 MG (241.3 MG ELEMENTAL MG) 400 MG: 400 (241.3 MG) TAB at 17:15

## 2022-04-12 RX ADMIN — DOCUSATE SODIUM 100 MG: 100 CAPSULE ORAL at 09:15

## 2022-04-12 RX ADMIN — METOPROLOL TARTRATE 75 MG: 50 TABLET, FILM COATED ORAL at 21:29

## 2022-04-12 RX ADMIN — OXYCODONE HYDROCHLORIDE 5 MG: 5 TABLET ORAL at 06:24

## 2022-04-12 RX ADMIN — Medication 250 MG: at 09:16

## 2022-04-12 RX ADMIN — WARFARIN SODIUM 7.5 MG: 7.5 TABLET ORAL at 17:15

## 2022-04-12 RX ADMIN — ACETAMINOPHEN 975 MG: 325 TABLET, FILM COATED ORAL at 21:29

## 2022-04-12 RX ADMIN — HYDROMORPHONE HYDROCHLORIDE 0.5 MG: 1 INJECTION, SOLUTION INTRAMUSCULAR; INTRAVENOUS; SUBCUTANEOUS at 09:23

## 2022-04-12 RX ADMIN — CEFAZOLIN SODIUM 2000 MG: 2 SOLUTION INTRAVENOUS at 21:30

## 2022-04-12 RX ADMIN — MAGNESIUM OXIDE TAB 400 MG (241.3 MG ELEMENTAL MG) 400 MG: 400 (241.3 MG) TAB at 09:16

## 2022-04-12 NOTE — ASSESSMENT & PLAN NOTE
Patient has a history of restless leg syndrome, follows with outpatient Neurology  · Will continue patient's home Mirapex

## 2022-04-12 NOTE — ASSESSMENT & PLAN NOTE
Home regimen: lisinopril 2 5 mg daily, Lopressor 75 mg p o  B i d    · Lisinopril on hold due to borderline hypotension

## 2022-04-12 NOTE — PHYSICAL THERAPY NOTE
PT TREATMENT     04/12/22 0945   Note Type   Note Type Treatment   Pain Assessment   Pain Assessment Tool 0-10   Pain Score 3   Pain Location/Orientation Orientation: Right;Location: Knee   Restrictions/Precautions   Other Precautions Pain  (elevate R LE)/WBAT R LE   General   Chart Reviewed Yes   Cognition   Overall Cognitive Status WFL   Subjective   Subjective "feeling better"   Bed Mobility   Supine to Sit 4  Minimal assistance   Additional items LE management   Transfers   Sit to Stand 5  Supervision   Stand to Sit 5  Supervision   Stand pivot 5  Supervision   Additional items   (with walker)   Ambulation/Elevation   Gait Assistance 5  Supervision   Assistive Device Rolling walker   Distance 50 feet, 75 feet   Stair Management Assistance 5  Supervision   Stair Management Technique One rail L;With cane; Step to pattern   Number of Stairs 4   Activity Tolerance   Activity Tolerance Patient tolerated treatment well   Exercises   TKR   (R knee self AAROM 10-60)   Neuro re-ed x 10 each ankle pumps, quad set, LAQ, SAQ, heel slide, seated knee flexion  Assessment   Prognosis Good   Problem List Decreased strength;Decreased range of motion; Impaired balance;Decreased mobility;Pain;Decreased skin integrity   Assessment Pt demonstrates improved R knee ROM and strength as well as improving activity tolerance admitted with R LE cellulitis s/p TKA 2 weeks ago  Encouraged pt to ambulate with a walker on the unit with supervision  The patient's AM-PAC Basic Mobility Inpatient Short Form Raw Score is 18  A Raw score of greater than 16 suggests the patient may benefit from discharge to home  Plan   Treatment/Interventions ADL retraining;LE strengthening/ROM; Functional transfer training;Elevations; Therapeutic exercise;Gait training;Bed mobility; Patient/family training;Equipment eval/education   Progress Progressing toward goals   PT Frequency 5-7x/wk   Recommendation   PT Discharge Recommendation Home with outpatient rehabilitation   Equipment Recommended   (pt has a cane and walker)   AM-PAC Basic Mobility Inpatient   Turning in Bed Without Bedrails 3   Lying on Back to Sitting on Edge of Flat Bed 3   Moving Bed to Chair 3   Standing Up From Chair 3   Walk in Room 3   Climb 3-5 Stairs 3   Basic Mobility Inpatient Raw Score 18   Basic Mobility Standardized Score 41 05   Highest Level Of Mobility   JH-HLM Goal 6: Walk 10 steps or more   JH-HL Highest Level of Mobility 7: Walk 25 feet or more   JH-HLM Goal Achieved Yes   End of Consult   Patient Position at End of Consult All needs within reach; Bedside chair   Licensure   [place] License Number  Lara   [id number]

## 2022-04-12 NOTE — PROGRESS NOTES
Progress Note - Orthopedics   Migdalia García 80 y o  female MRN: [id number]  Unit/Bed#: 2 [address] Encounter: [id number]    Assessment:  1) Cellulits RLE-improving  2) s/p RA R TKA 3/28    Plan:  On examination today she reports that the pain in her right lower extremity is again continuing to improve  Her swelling and erythema has also continued to improve  Her range of motion regarding the knee continues to be without significant pain     IV antibiotics as perSLIM  Continue DVT prophylaxis with Coumadin- dosing per slim  WBAT RLE  Elevate right lower extremity at rest  PT/OT  Analgesia p r n  No further orthopedic recommendations at this time  Patient appears to be making clinical improvements on IV antibiotics  Will defer type of antibiotic and duration of antibiotic to the internal medicine team     Weight bearing: WBAT RLE    VTE Pharmacologic Prophylaxis: Warfarin (Coumadin)  VTE Mechanical Prophylaxis: sequential compression device    Subjective:  Patient seen examined at bedside  She states pain continues to improve in addition to her swelling  Her redness also is improving per the patient    Vitals: Blood pressure 129/85, pulse (!) 116, temperature 97 8 °F (36 6 °C), resp  rate 19, height 5' 4 5" (1 638 m), weight 103 kg (228 lb), SpO2 94 %  ,Body mass index is 38 53 kg/m²  Intake/Output Summary (Last 24 hours) at 4/12/2022 1337  Last data filed at 4/12/2022 1301  Gross per 24 hour   Intake 350 ml   Output 3150 ml   Net -2800 ml       Invasive Devices  Report    Peripheral Intravenous Line            Peripheral IV 04/12/22 Right Hand <1 day          Drain            External Urinary Catheter <1 day                Physical Exam: NAD  Ortho Exam: RLE:  Erythema improving  Leg less warm today  She is less painful on examination  Arc of motion still without significant pain  All compartments soft compressible  Neurovascular intact  Aric Keyon D O    Division of Adult Reconstruction  Department of Orthopaedic Surgery  Formerly [facility]

## 2022-04-12 NOTE — ASSESSMENT & PLAN NOTE
Right knee x-ray showed no osseous abnormality as per radiology report  · Physical therapy  · Analgesics PRN  · Orthopedic evaluation appreciated

## 2022-04-12 NOTE — PLAN OF CARE
Problem: Potential for Falls  Goal: Patient will remain free of falls  Description: INTERVENTIONS:  - Educate patient/family on patient safety including physical limitations  - Instruct patient to call for assistance with activity   - Consult OT/PT to assist with strengthening/mobility   - Keep Call bell within reach  - Keep bed low and locked with side rails adjusted as appropriate  - Keep care items and personal belongings within reach  - Initiate and maintain comfort rounds  - Make Fall Risk Sign visible to staff  - Offer Toileting every 2 Hours, in advance of need  - Initiate/Maintain bed alarm  - Obtain necessary fall risk management equipment:   - Apply yellow socks and bracelet for high fall risk patients  4/11/2022 2312 by Abel Lamar RN  Outcome: Progressing  4/11/2022 2312 by Abel Lamar RN  Outcome: Progressing     Problem: MOBILITY - ADULT  Goal: Maintain or return to baseline ADL function  Description: INTERVENTIONS:  -  Assess patient's ability to carry out ADLs; assess patient's baseline for ADL function and identify physical deficits which impact ability to perform ADLs (bathing, care of mouth/teeth, toileting, grooming, dressing, etc )  - Assess/evaluate cause of self-care deficits   - Assess range of motion  - Assess patient's mobility; develop plan if impaired  - Assess patient's need for assistive devices and provide as appropriate  - Encourage maximum independence but intervene and supervise when necessary  - Involve family in performance of ADLs  - Assess for home care needs following discharge   - Consider OT consult to assist with ADL evaluation and planning for discharge  - Provide patient education as appropriate  4/11/2022 2312 by Abel Lamar RN  Outcome: Progressing  4/11/2022 2312 by Abel Lamar RN  Outcome: Progressing     Problem: SKIN/TISSUE INTEGRITY - ADULT  Goal: Incision(s), wounds(s) or drain site(s) healing without S/S of infection  Description: INTERVENTIONS  - Assess and document dressing, incision, wound bed, drain sites and surrounding tissue  - Provide patient and family education  - Perform skin care/dressing changes   4/11/2022 2312 by Rosie Mendoza RN  Outcome: Progressing  4/11/2022 2312 by Rosie Mendoza RN  Outcome: Progressing     Problem: MUSCULOSKELETAL - ADULT  Goal: Maintain or return mobility to safest level of function  Description: INTERVENTIONS:  - Assess patient's ability to carry out ADLs; assess patient's baseline for ADL function and identify physical deficits which impact ability to perform ADLs (bathing, care of mouth/teeth, toileting, grooming, dressing, etc )  - Assess/evaluate cause of self-care deficits   - Assess range of motion  - Assess patient's mobility  - Assess patient's need for assistive devices and provide as appropriate  - Encourage maximum independence but intervene and supervise when necessary  - Involve family in performance of ADLs  - Assess for home care needs following discharge   - Consider OT consult to assist with ADL evaluation and planning for discharge  - Provide patient education as appropriate  4/11/2022 2312 by Rosie Mendoza RN  Outcome: Progressing  4/11/2022 2312 by Rosie Mendoza RN  Outcome: Progressing     Problem: Prexisting or High Potential for Compromised Skin Integrity  Goal: Skin integrity is maintained or improved  Description: INTERVENTIONS:  - Identify patients at risk for skin breakdown  - Assess and monitor skin integrity  - Assess and monitor nutrition and hydration status  - Monitor labs   - Assess for incontinence   - Turn and reposition patient  - Assist with mobility/ambulation  - Relieve pressure over bony prominences  - Avoid friction and shearing  - Provide appropriate hygiene as needed including keeping skin clean and dry  - Evaluate need for skin moisturizer/barrier cream  - Collaborate with interdisciplinary team   - Patient/family teaching  - Consider wound care consult   4/11/2022 2312 by Chantal Armstrong RN  Outcome: Progressing  4/11/2022 2312 by Chantal Armstrong RN  Outcome: Progressing     Problem: PAIN - ADULT  Goal: Verbalizes/displays adequate comfort level or baseline comfort level  Description: Interventions:  - Encourage patient to monitor pain and request assistance  - Assess pain using appropriate pain scale  - Administer analgesics based on type and severity of pain and evaluate response  - Implement non-pharmacological measures as appropriate and evaluate response  - Consider cultural and social influences on pain and pain management  - Notify physician/advanced practitioner if interventions unsuccessful or patient reports new pain  4/11/2022 2312 by Chantal Armstrong RN  Outcome: Progressing  4/11/2022 2312 by Chantal Armstrong RN  Outcome: Progressing

## 2022-04-12 NOTE — PROGRESS NOTES
Emmy Oleary  Progress Note - Debby Mckeon 1940, 80 y o  female MRN: [id number]  Unit/Bed#: 2 South 202 Encounter: [id number]  Primary Care Provider: Lashawn Guerrero MD   Date and time admitted to hospital: 4/10/2022  6:58 AM    * Cellulitis of right lower extremity  Assessment & Plan  Cellulitis of right lower extremity POA as evidence by erythema, warmth , swelling and pain to palpation  · Patient had recently undergone a total right knee replacement on 03/28 with Orthopedics  · Venous duplex was performed, negative for DVT  · X-ray of right knee showed no osseous abnormality   · Orthopedic surgery following, recommendations appreciated  · Continue IV Ancef   · Blood cultures negative    Status post total knee replacement using cement, right  Assessment & Plan  Right knee x-ray showed no osseous abnormality as per radiology report  · Physical therapy  · Analgesics PRN  · Orthopedic evaluation appreciated     Peripheral neuropathy  Assessment & Plan  Continue home gabapentin 800 mg 4 times daily    Essential hypertension  Assessment & Plan  Home regimen: lisinopril 2 5 mg daily, Lopressor 75 mg p o  B i d  · Lisinopril on hold due to borderline hypotension    RLS (restless legs syndrome)  Assessment & Plan  Patient has a history of restless leg syndrome, follows with outpatient Neurology  · Will continue patient's home Mirapex      Atrial fibrillation ([address] ) [I48 91]  Assessment & Plan  Continue home regimen: Lopressor 75 mg p o  B i d  and Coumadin 7 5 mg daily   · Followup INR         VTE Pharmacologic Prophylaxis: Coumadin     Patient Centered Rounds: I performed bedside rounds with nursing staff today  Discussions with Specialists or Other Care Team Provider: Yes    Education and Discussions with Family / Patient: Yes    Time Spent for Care: 45 minutes  More than 50% of total time spent on counseling and coordination of care as described above      Current Length of Stay: 2 day(s)  Current Patient Status: Inpatient   Certification Statement: The patient will continue to require additional inpatient hospital stay due to cellulitis  Discharge Plan: Anticipate discharge in 24-48 hrs to home  Code Status: Level 1 - Full Code    Subjective:   No acute events overnight  Patient feels better overall    Objective:     Vitals:   Temp (24hrs), Av 9 °F (36 6 °C), Min:97 8 °F (36 6 °C), Max:98 1 °F (36 7 °C)    Temp:  [97 8 °F (36 6 °C)-98 1 °F (36 7 °C)] 97 8 °F (36 6 °C)  HR:  [] 116  Resp:  [19-22] 19  BP: (112-129)/(65-85) 129/85  SpO2:  [94 %-97 %] 94 %  Body mass index is 38 53 kg/m²  Input and Output Summary (last 24 hours): Intake/Output Summary (Last 24 hours) at 2022 1243  Last data filed at 2022 0600  Gross per 24 hour   Intake 350 ml   Output 2250 ml   Net -1900 ml       Physical Exam:   Physical Exam  HENT:      Head: Normocephalic and atraumatic  Mouth/Throat:      Mouth: Mucous membranes are moist    Eyes:      Extraocular Movements: Extraocular movements intact  Cardiovascular:      Rate and Rhythm: Normal rate  Pulmonary:      Effort: Pulmonary effort is normal    Abdominal:      General: Abdomen is flat  Palpations: Abdomen is soft  Tenderness: There is no abdominal tenderness  Skin:     General: Skin is warm and dry  Findings: Erythema present  Comments: RLE   Neurological:      Mental Status: She is alert             Additional Data:     Labs:  Results from last 7 days   Lab Units 22  0610   WBC Thousand/uL 8 55   HEMOGLOBIN g/dL 11 6   HEMATOCRIT % 35 8   PLATELETS Thousands/uL 228   NEUTROS PCT % 71   LYMPHS PCT % 18   MONOS PCT % 10   EOS PCT % 0     Results from last 7 days   Lab Units 22  0610 22  0618 04/10/22  0748   SODIUM mmol/L 137   < > 131*   POTASSIUM mmol/L 4 2   < > 4 1   CHLORIDE mmol/L 100   < > 94*   CO2 mmol/L 30   < > 29   BUN mg/dL 26*   < > 35*   CREATININE mg/dL 1 01   < > 0  97   ANION GAP mmol/L 7   < > 8   CALCIUM mg/dL 9 0   < > 9 1   ALBUMIN g/dL  --   --  3 6   TOTAL BILIRUBIN mg/dL  --   --  1 13*   ALK PHOS U/L  --   --  63   ALT U/L  --   --  31   AST U/L  --   --  23   GLUCOSE RANDOM mg/dL 97   < > 102    < > = values in this interval not displayed  Results from last 7 days   Lab Units 04/12/22  0610   INR  1 94*             Results from last 7 days   Lab Units 04/10/22  0748   LACTIC ACID mmol/L 1 2       Lines/Drains:  Invasive Devices  Report    Peripheral Intravenous Line            Peripheral IV 04/12/22 Right Hand <1 day          Drain            External Urinary Catheter <1 day                      Imaging: Reviewed radiology reports from this admission including: xray(s)    Recent Cultures (last 7 days):   Results from last 7 days   Lab Units 04/10/22  0952 04/10/22  0748   BLOOD CULTURE  No Growth at 24 hrs  No Growth at 24 hrs         Last 24 Hours Medication List:   Current Facility-Administered Medications   Medication Dose Route Frequency Provider Last Rate    acetaminophen  975 mg Oral [facility] Sanjuana Danes, CRNP      calcium acetate  667 mg Oral Daily Sanjuana Danes, CRNP      cefazolin  2,000 mg Intravenous J1L Sanjuana Danes, CRNP 2,000 mg (04/12/22 1234)    diphenhydrAMINE  25 mg Intravenous Q6H PRN Sanjuana Danes, CRNP      docusate sodium  100 mg Oral BID Sanjuana Danes, CRNP      famotidine  20 mg Oral HS PRN Sanjuana Danes, CRNP      fluticasone  1 spray Each Nare Daily Sanjuana Danes, CRNP      furosemide  40 mg Oral Daily Sanjuana Danes, CRNP      gabapentin  800 mg Oral 4x Daily Sanjuana Danes, CRNP      HYDROmorphone  0 5 mg Intravenous Q4H PRN Agape Warnell Donate, DO      loratadine  10 mg Oral Daily Sanjuana Danes, CRNP      magnesium oxide  400 mg Oral BID Sanjuana Danes, CRNP      metoprolol tartrate  75 mg Oral BID Asnjuana Danes, CRNP      multivitamin stress formula  1 tablet Oral Daily Siomara Hernandez, CRNP      ondansetron  4 mg Intravenous Q6H PRN Siomara Hernandez, CRNP      oxyCODONE  5 mg Oral Q6H PRN Siomara Hernandez, CRNP      pramipexole  0 5 mg Oral BID Siomara Hernandez, CRNP      saccharomyces boulardii  250 mg Oral BID Siomara Hernandez, CRNP      warfarin  7 5 mg Oral Daily Varghese Eli PA-C          Today, Patient Was Seen By: Gopi Guerrero DO    **Please Note: This note may have been constructed using a voice recognition system  **

## 2022-04-12 NOTE — PLAN OF CARE
Problem: Potential for Falls  Goal: Patient will remain free of falls  Description: INTERVENTIONS:  - Educate patient/family on patient safety including physical limitations  - Instruct patient to call for assistance with activity   - Consult OT/PT to assist with strengthening/mobility   - Keep Call bell within reach  - Keep bed low and locked with side rails adjusted as appropriate  - Keep care items and personal belongings within reach  - Initiate and maintain comfort rounds  - Make Fall Risk Sign visible to staff  - Offer Toileting every 2 Hours, in advance of need  - Initiate/Maintain bed alarm  - Obtain necessary fall risk management equipment:   - Apply yellow socks and bracelet for high fall risk patients  Outcome: Progressing     Problem: MOBILITY - ADULT  Goal: Maintain or return to baseline ADL function  Description: INTERVENTIONS:  -  Assess patient's ability to carry out ADLs; assess patient's baseline for ADL function and identify physical deficits which impact ability to perform ADLs (bathing, care of mouth/teeth, toileting, grooming, dressing, etc )  - Assess/evaluate cause of self-care deficits   - Assess range of motion  - Assess patient's mobility; develop plan if impaired  - Assess patient's need for assistive devices and provide as appropriate  - Encourage maximum independence but intervene and supervise when necessary  - Involve family in performance of ADLs  - Assess for home care needs following discharge   - Consider OT consult to assist with ADL evaluation and planning for discharge  - Provide patient education as appropriate  Outcome: Progressing     Problem: SKIN/TISSUE INTEGRITY - ADULT  Goal: Incision(s), wounds(s) or drain site(s) healing without S/S of infection  Description: INTERVENTIONS  - Assess and document dressing, incision, wound bed, drain sites and surrounding tissue  - Provide patient and family education  - Perform skin care/dressing changes   Outcome: Progressing

## 2022-04-12 NOTE — ASSESSMENT & PLAN NOTE
Cellulitis of right lower extremity POA as evidence by erythema, warmth , swelling and pain to palpation  · Patient had recently undergone a total right knee replacement on 03/28 with Orthopedics  · Venous duplex was performed, negative for DVT  · X-ray of right knee showed no osseous abnormality   · Orthopedic surgery following, recommendations appreciated  · Continue IV Ancef   · Blood cultures negative

## 2022-04-13 ENCOUNTER — APPOINTMENT (INPATIENT)
Dept: RADIOLOGY | Facility: HOSPITAL | Age: 82
DRG: 603 | End: 2022-04-13
Payer: MEDICARE

## 2022-04-13 LAB
INR PPP: 2.03 (ref 0.84–1.19)
PROTHROMBIN TIME: 22.3 SECONDS (ref 11.6–14.5)

## 2022-04-13 PROCEDURE — 97530 THERAPEUTIC ACTIVITIES: CPT

## 2022-04-13 PROCEDURE — 99231 SBSQ HOSP IP/OBS SF/LOW 25: CPT | Performed by: ORTHOPAEDIC SURGERY

## 2022-04-13 PROCEDURE — 99232 SBSQ HOSP IP/OBS MODERATE 35: CPT | Performed by: STUDENT IN AN ORGANIZED HEALTH CARE EDUCATION/TRAINING PROGRAM

## 2022-04-13 PROCEDURE — 74022 RADEX COMPL AQT ABD SERIES: CPT

## 2022-04-13 PROCEDURE — 85610 PROTHROMBIN TIME: CPT | Performed by: STUDENT IN AN ORGANIZED HEALTH CARE EDUCATION/TRAINING PROGRAM

## 2022-04-13 RX ORDER — POLYETHYLENE GLYCOL 3350 17 G/17G
17 POWDER, FOR SOLUTION ORAL DAILY
Status: DISCONTINUED | OUTPATIENT
Start: 2022-04-13 | End: 2022-04-14 | Stop reason: HOSPADM

## 2022-04-13 RX ORDER — MAGNESIUM CARB/ALUMINUM HYDROX 105-160MG
296 TABLET,CHEWABLE ORAL ONCE
Status: COMPLETED | OUTPATIENT
Start: 2022-04-13 | End: 2022-04-13

## 2022-04-13 RX ORDER — BISACODYL 10 MG
10 SUPPOSITORY, RECTAL RECTAL ONCE
Status: COMPLETED | OUTPATIENT
Start: 2022-04-13 | End: 2022-04-13

## 2022-04-13 RX ORDER — LACTULOSE 20 G/30ML
30 SOLUTION ORAL 3 TIMES DAILY
Status: DISCONTINUED | OUTPATIENT
Start: 2022-04-13 | End: 2022-04-14 | Stop reason: HOSPADM

## 2022-04-13 RX ADMIN — METOPROLOL TARTRATE 75 MG: 50 TABLET, FILM COATED ORAL at 20:10

## 2022-04-13 RX ADMIN — DOCUSATE SODIUM 100 MG: 100 CAPSULE ORAL at 20:10

## 2022-04-13 RX ADMIN — CEFAZOLIN SODIUM 2000 MG: 2 SOLUTION INTRAVENOUS at 20:10

## 2022-04-13 RX ADMIN — B-COMPLEX W/ C & FOLIC ACID TAB 1 TABLET: TAB at 10:18

## 2022-04-13 RX ADMIN — ACETAMINOPHEN 975 MG: 325 TABLET, FILM COATED ORAL at 16:32

## 2022-04-13 RX ADMIN — METOPROLOL TARTRATE 75 MG: 50 TABLET, FILM COATED ORAL at 10:20

## 2022-04-13 RX ADMIN — ACETAMINOPHEN 975 MG: 325 TABLET, FILM COATED ORAL at 05:09

## 2022-04-13 RX ADMIN — CEFAZOLIN SODIUM 2000 MG: 2 SOLUTION INTRAVENOUS at 14:54

## 2022-04-13 RX ADMIN — BISACODYL 10 MG: 10 SUPPOSITORY RECTAL at 16:32

## 2022-04-13 RX ADMIN — WARFARIN SODIUM 7.5 MG: 7.5 TABLET ORAL at 10:18

## 2022-04-13 RX ADMIN — GABAPENTIN 800 MG: 400 CAPSULE ORAL at 10:18

## 2022-04-13 RX ADMIN — POLYETHYLENE GLYCOL 3350 17 G: 17 POWDER, FOR SOLUTION ORAL at 09:05

## 2022-04-13 RX ADMIN — MAGNESIUM OXIDE TAB 400 MG (241.3 MG ELEMENTAL MG) 400 MG: 400 (241.3 MG) TAB at 10:18

## 2022-04-13 RX ADMIN — MAGNESIUM CITRATE 296 ML: 1.75 LIQUID ORAL at 12:27

## 2022-04-13 RX ADMIN — CEFAZOLIN SODIUM 2000 MG: 2 SOLUTION INTRAVENOUS at 04:40

## 2022-04-13 RX ADMIN — DOCUSATE SODIUM 100 MG: 100 CAPSULE ORAL at 10:18

## 2022-04-13 RX ADMIN — LACTULOSE 30 G: 20 SOLUTION ORAL at 16:32

## 2022-04-13 RX ADMIN — FUROSEMIDE 40 MG: 40 TABLET ORAL at 10:20

## 2022-04-13 RX ADMIN — PRAMIPEXOLE DIHYDROCHLORIDE 0.5 MG: 0.5 TABLET ORAL at 16:32

## 2022-04-13 RX ADMIN — CALCIUM ACETATE 667 MG: 667 CAPSULE ORAL at 10:18

## 2022-04-13 RX ADMIN — Medication 250 MG: at 10:18

## 2022-04-13 RX ADMIN — LORATADINE 10 MG: 10 TABLET ORAL at 10:18

## 2022-04-13 NOTE — PLAN OF CARE
Problem: Potential for Falls  Goal: Patient will remain free of falls  Description: INTERVENTIONS:  - Educate patient/family on patient safety including physical limitations  - Instruct patient to call for assistance with activity   - Consult OT/PT to assist with strengthening/mobility   - Keep Call bell within reach  - Keep bed low and locked with side rails adjusted as appropriate  - Keep care items and personal belongings within reach  - Initiate and maintain comfort rounds  - Make Fall Risk Sign visible to staff  - Offer Toileting every 2 Hours, in advance of need  - Initiate/Maintain bed alarm  - Obtain necessary fall risk management equipment:   - Apply yellow socks and bracelet for high fall risk patients  Outcome: Progressing     Problem: MOBILITY - ADULT  Goal: Maintain or return to baseline ADL function  Description: INTERVENTIONS:  -  Assess patient's ability to carry out ADLs; assess patient's baseline for ADL function and identify physical deficits which impact ability to perform ADLs (bathing, care of mouth/teeth, toileting, grooming, dressing, etc )  - Assess/evaluate cause of self-care deficits   - Assess range of motion  - Assess patient's mobility; develop plan if impaired  - Assess patient's need for assistive devices and provide as appropriate  - Encourage maximum independence but intervene and supervise when necessary  - Involve family in performance of ADLs  - Assess for home care needs following discharge   - Consider OT consult to assist with ADL evaluation and planning for discharge  - Provide patient education as appropriate  Outcome: Progressing

## 2022-04-13 NOTE — OCCUPATIONAL THERAPY NOTE
OT TREATMENT     04/13/22 1120   Note Type   Note Type Treatment   Pain Assessment   Pain Assessment Tool 0-10   Pain Score No Pain   ADL   Toileting Assistance  5  Supervision/Setup   Toileting Comments   (pt reported feeling very constipated and uncomfortable)   Bed Mobility   Rolling R 7  Independent   Additional items   (with increased time d/t R knee discomfort)   Rolling L 7  Independent   Supine to Sit 7  Independent   Sit to Supine 4  Minimal assistance  (for LE management)   Additional items Assist x 1   Transfers   Sit to Stand 6  Modified independent   Additional items Assist x 1   Toilet transfer 6  Modified independent  (to bathroom using RW)   Functional Mobility   Functional Mobility 5  Supervision   Additional Comments   (using RW)   Toilet Transfers   Toilet Transfer From Rolling walker   Toilet Transfer to Standard toilet  (with BSC over toilet)   Toilet Transfers Modified independence   Assessment   Assessment Pt seen in room alert, cooperative and responsive  Pt seen initially in morning while seated in toilet and reported feeling uncomfortable and constipated, pt requested to be seen later again  Pt seen again and completed bed/ functionalmobilty tasks with S to mod indp  Pt reported too high height of BSC over toilet , OT adjusted accordingly  Pt also have difficulty with R LE during bed mobility, educated regarding use of leg , pt showed good understanding  and where to purchase one if needed  The patient's raw score on the AM-PAC Daily Activity inpatient short form is 21, standardized score is 44 27, greater than 39 4  Patients at this level are likely to benefit from discharge to home  Please refer to the recommendation of the Occupational Therapist for safe discharge planning  Plan   Treatment Interventions ADL retraining;Functional transfer training;UE strengthening/ROM; Patient/family training;Continued evaluation; Activityengagement   OT Frequency 3-5x/wk   Recommendation   OT Discharge Recommendation No rehabilitation needs   AM-PAC Daily Activity Inpatient   Lower Body Dressing 3   Bathing 3   Toileting 3   Upper Body Dressing 4   Grooming 4   Eating 4   Daily Activity Raw Score 21   Daily Activity Standardized Score (Calc for Raw Score >=11) 44 27   AM-PAC Applied Cognition Inpatient   Following a Speech/Presentation 4   Understanding Ordinary Conversation 4   Taking Medications 4   Remembering Where Things Are Placed or Put Away 4   Remembering List of 4-5 Errands 4   Taking Care of Complicated Tasks 4   Applied Cognition Raw Score 24   Applied Cognition Standardized Score 63 51   Licensure   [place] License Number  Shereen Scott Robert 87 OTR/L [id number]

## 2022-04-13 NOTE — PHYSICAL THERAPY NOTE
PT TREATMENT     04/13/22 1001   Note Type   Note Type Treatment   Pain Assessment   Pain Assessment Tool Boykin-Baker FACES   Boykin-Baker FACES Pain Rating 8   Pain Location/Orientation   (rectum)   Restrictions/Precautions   Other Precautions Fall Risk;Pain   General   Chart Reviewed Yes   Cognition   Overall Cognitive Status WFL   Subjective   Subjective "I am so constipated"   Bed Mobility   Supine to Sit 7  Independent   Sit to Supine 7  Independent   Transfers   Sit to Stand 7  Independent   Stand to Sit 7  Independent   Stand pivot 7  Independent   Additional items   (with walker)   Toilet transfer 5  Supervision   Additional items   (commode over toilet)   Ambulation/Elevation   Gait Assistance 5  Supervision   Assistive Device Rolling walker   Distance 2x100 feet   Assessment   Prognosis Good   Problem List Decreased strength;Decreased range of motion;Decreased mobility;Pain   Assessment Pt demonstrates improving function and pain in her knee 2 weeks s/p R TKA now with lower leg cellulitis  Pt is limited today not by her knee but by constipation  Anticipate as cellulitis and constipation resolve, pt will return to her baseline with resumption of OP PT for her R TKA  R knee exercises deferred due to severe constipation pain  Pt encouraged to perform quad sets, knee AROM as able  The patient's [facility] Basic Mobility Inpatient Short Form Raw Score is 18  A Raw score of greater than 16 suggests the patient may benefit from discharge to home  Plan   Treatment/Interventions ADL retraining;Functional transfer training;LE strengthening/ROM; Elevations; Therapeutic exercise;Gait training   Progress Progressing toward goals   PT Frequency 5-7x/wk   Recommendation   PT Discharge Recommendation Home with outpatient rehabilitation   [facility] Basic Mobility Inpatient   Turning in Bed Without Bedrails 4   Lying on Back to Sitting on Edge of Flat Bed 4   Moving Bed to Chair 4   Standing Up From Chair 4   Walk in Room 3   Climb 3-5 Stairs 3   Basic Mobility Inpatient Raw Score 22   Basic Mobility Standardized Score 47 4   Highest Level Of Mobility   JH-HLM Goal 7: Walk 25 feet or more   JH-HLM Highest Level of Mobility 7: Walk 25 feet or more   JH-HLM Goal Achieved Yes   Licensure   [place] License Number  Phyllis White PT [id number]

## 2022-04-13 NOTE — PROGRESS NOTES
C[facility] [address]  68   Progress Note - Alina Leslie 1940, 80 y o  female MRN: [id number]  Unit/Bed#: 2 South 202 Encounter: [id number]  Primary Care Provider: Vaishali Em MD   Date and time admitted to hospital: 4/10/2022  6:58 AM    * Cellulitis of right lower extremity  Assessment & Plan  Cellulitis of right lower extremity POA as evidence by erythema, warmth , swelling and pain to palpation  · Patient had recently undergone a total right knee replacement on 03/28 with Orthopedics  · Venous duplex was performed, negative for DVT  · X-ray of right knee showed no osseous abnormality   · Orthopedic surgery following, recommendations appreciated  · Continue IV Ancef   · Blood cultures negative    Status post total knee replacement using cement, right  Assessment & Plan  Right knee x-ray showed no osseous abnormality as per radiology report  · Physical therapy  · Analgesics PRN  · Orthopedic evaluation appreciated     Peripheral neuropathy  Assessment & Plan  Continue home gabapentin 800 mg 4 times daily    Essential hypertension  Assessment & Plan  Home regimen: lisinopril 2 5 mg daily, Lopressor 75 mg p o  B i d  · Lisinopril on hold due to borderline hypotension    RLS (restless legs syndrome)  Assessment & Plan  Patient has a history of restless leg syndrome, follows with outpatient Neurology  · Will continue patient's home Mirapex      Atrial fibrillation ([address] ) [I48 91]  Assessment & Plan  Continue home regimen: Lopressor 75 mg p o  B i d  and Coumadin 7 5 mg daily   · Followup INR       VTE Pharmacologic Prophylaxis: Coumadin     Patient Centered Rounds: I performed bedside rounds with nursing staff today  Discussions with Specialists or Other Care Team Provider: Yes    Education and Discussions with Family / Patient: Yes    Time Spent for Care: 45 minutes  More than 50% of total time spent on counseling and coordination of care as described above      Current Length of Stay: 3 day(s)  Current Patient Status: Inpatient   Certification Statement: The patient will continue to require additional inpatient hospital stay due to cellulitis  Discharge Plan: Anticipate discharge in 24-48 hrs to home  Code Status: Level 1 - Full Code    Subjective:   No acute events overnight   Patient reports improvement in pain     Objective:     Vitals:   Temp (24hrs), Av °F (36 7 °C), Min:97 8 °F (36 6 °C), Max:98 1 °F (36 7 °C)    Temp:  [97 8 °F (36 6 °C)-98 1 °F (36 7 °C)] 98 °F (36 7 °C)  HR:  [] 82  Resp:  [18] 18  BP: (110-130)/(60-82) 130/82  SpO2:  [94 %-99 %] 99 %  Body mass index is 38 53 kg/m²  Input and Output Summary (last 24 hours): Intake/Output Summary (Last 24 hours) at 2022 1205  Last data filed at 2022 1716  Gross per 24 hour   Intake --   Output 1800 ml   Net -1800 ml       Physical Exam:   Physical Exam  HENT:      Head: Normocephalic and atraumatic  Mouth/Throat:      Mouth: Mucous membranes are moist    Eyes:      Extraocular Movements: Extraocular movements intact  Cardiovascular:      Rate and Rhythm: Normal rate  Pulmonary:      Effort: Pulmonary effort is normal    Abdominal:      General: Abdomen is flat  Palpations: Abdomen is soft  Tenderness: There is no abdominal tenderness  Skin:     General: Skin is warm and dry  Neurological:      Mental Status: She is alert            Additional Data:     Labs:  Results from last 7 days   Lab Units 22  0610   WBC Thousand/uL 8 55   HEMOGLOBIN g/dL 11 6   HEMATOCRIT % 35 8   PLATELETS Thousands/uL 228   NEUTROS PCT % 71   LYMPHS PCT % 18   MONOS PCT % 10   EOS PCT % 0     Results from last 7 days   Lab Units 22  0610 22  0618 04/10/22  0748   SODIUM mmol/L 137   < > 131*   POTASSIUM mmol/L 4 2   < > 4 1   CHLORIDE mmol/L 100   < > 94*   CO2 mmol/L 30   < > 29   BUN mg/dL 26*   < > 35*   CREATININE mg/dL 1 01   < > 0 97   ANION GAP mmol/L 7   < > 8   CALCIUM mg/dL 9 0   < > 9  1   ALBUMIN g/dL  --   --  3 6   TOTAL BILIRUBIN mg/dL  --   --  1 13*   ALK PHOS U/L  --   --  63   ALT U/L  --   --  31   AST U/L  --   --  23   GLUCOSE RANDOM mg/dL 97   < > 102    < > = values in this interval not displayed  Results from last 7 days   Lab Units 04/13/22  0519   INR  2 03*             Results from last 7 days   Lab Units 04/10/22  0748   LACTIC ACID mmol/L 1 2       Lines/Drains:  Invasive Devices  Report    Peripheral Intravenous Line            Peripheral IV 04/12/22 Right Hand 1 day          Drain            External Urinary Catheter 1 day                      Imaging: Reviewed radiology reports from this admission including: ultrasound(s)    Recent Cultures (last 7 days):   Results from last 7 days   Lab Units 04/10/22  0952 04/10/22  0748   BLOOD CULTURE  No Growth at 48 hrs  No Growth at 48 hrs         Last 24 Hours Medication List:   Current Facility-Administered Medications   Medication Dose Route Frequency Provider Last Rate    acetaminophen  975 mg Oral [facility] Alka Taco, CRNP      calcium acetate  667 mg Oral Daily Alka Taco, CRNP      cefazolin  2,000 mg Intravenous H2P Alka Taco, CRNP 2,000 mg (04/13/22 0440)    diphenhydrAMINE  25 mg Intravenous Q6H PRN Alka Taco, CRNP      docusate sodium  100 mg Oral BID Alka Taco, CRNP      famotidine  20 mg Oral HS PRN Alka Taco, CRNP      fluticasone  1 spray Each Nare Daily Alka Taco, CRNP      furosemide  40 mg Oral Daily Alka Taco, CRNP      gabapentin  800 mg Oral 4x Daily Alka Taco, CRNP      HYDROmorphone  0 5 mg Intravenous Q4H PRN Lynn Casiano, DO      loratadine  10 mg Oral Daily Alka Taco, CRNP      magnesium citrate  296 mL Oral Once Lynn Elizabeth, DO      magnesium oxide  400 mg Oral BID Alka Taco, CRNP      metoprolol tartrate  75 mg Oral BID Alka Taco, CRNP      multivitamin stress formula  1 tablet Oral Daily [place] Tiana, CRROBERT      ondansetron  4 mg Intravenous Q6H PRN [place] Tiana, CRNP      oxyCODONE  5 mg Oral Q6H PRN [place] Tiana, CRNP      polyethylene glycol  17 g Oral Daily Lynn Elizabeth DO      pramipexole  0 5 mg Oral BID [place] Tiana, CRNP      saccharomyces boulardii  250 mg Oral BID [place] Tiana, RICKY      warfarin  7 5 mg Oral Daily Hugo Irizarry PA-C          Today, Patient Was Seen By: Korey Christina DO    **Please Note: This note may have been constructed using a voice recognition system  **

## 2022-04-13 NOTE — PROGRESS NOTES
Progress Note - Orthopedics   Ana Ortiz 80 y o  female MRN: [id number]  Unit/Bed#: 2 South 202 Encounter: [id number]    Assessment:  1) Cellulits RLE-improving  2) s/p RA R TKA 3/28    Plan:  Patient's right lower extremity continues to improve  Patient is feeling much better in her right lower extremity  IV antibiotics as perSLIM  Continue DVT prophylaxis with Coumadin- dosing per slim  WBAT RLE  Elevate right lower extremity at rest  PT/OT  Analgesia p r n  No further orthopedic recommendations at this time  Clinically, the patient continues to make improvement  We discussed the importance of restarting outpatient physical therapy upon her discharge  At this time no further orthopedic intervention is needed  Will defer remainder of antibiotic care to the internal medicine service  Orthopedics to sign off  Weight bearing: WBAT RLE    VTE Pharmacologic Prophylaxis: Warfarin (Coumadin)  VTE Mechanical Prophylaxis: sequential compression device    Subjective:  Patient seen examined at bedside  Patient states her right lower extremity pain continues to improve  Her swelling is also improved  Vitals: Blood pressure 130/82, pulse 82, temperature 98 6 °F (37 °C), temperature source Oral, resp  rate 18, height 5' 4 5" (1 638 m), weight 103 kg (228 lb), SpO2 99 %  ,Body mass index is 38 53 kg/m²  Intake/Output Summary (Last 24 hours) at 4/13/2022 1326  Last data filed at 4/12/2022 1716  Gross per 24 hour   Intake --   Output 900 ml   Net -900 ml       Invasive Devices  Report    Peripheral Intravenous Line            Peripheral IV 04/12/22 Right Hand 1 day          Drain            External Urinary Catheter 1 day                Physical Exam: NAD  Ortho Exam: RLE:  Erythema continuing improving  Leg less warm today  Arc of motion still without significant pain  All compartments soft compressible  Neurovascular intact    Less tenderness palpation diffusely right lower extremity      Melquiades Adan MELENDEZ    Division of Adult Reconstruction  Department of Orthopaedic Surgery  [facility]

## 2022-04-14 ENCOUNTER — APPOINTMENT (OUTPATIENT)
Dept: PHYSICAL THERAPY | Facility: CLINIC | Age: 82
End: 2022-04-14
Payer: MEDICARE

## 2022-04-14 ENCOUNTER — TRANSITIONAL CARE MANAGEMENT (OUTPATIENT)
Dept: INTERNAL MEDICINE CLINIC | Facility: CLINIC | Age: 82
End: 2022-04-14

## 2022-04-14 VITALS
HEART RATE: 87 BPM | SYSTOLIC BLOOD PRESSURE: 106 MMHG | HEIGHT: 65 IN | BODY MASS INDEX: 37.99 KG/M2 | WEIGHT: 228 LBS | OXYGEN SATURATION: 96 % | DIASTOLIC BLOOD PRESSURE: 76 MMHG | TEMPERATURE: 97.8 F | RESPIRATION RATE: 19 BRPM

## 2022-04-14 PROCEDURE — 99239 HOSP IP/OBS DSCHRG MGMT >30: CPT | Performed by: STUDENT IN AN ORGANIZED HEALTH CARE EDUCATION/TRAINING PROGRAM

## 2022-04-14 RX ORDER — CEPHALEXIN 500 MG/1
500 CAPSULE ORAL EVERY 6 HOURS SCHEDULED
Qty: 16 CAPSULE | Refills: 0 | Status: SHIPPED | OUTPATIENT
Start: 2022-04-14 | End: 2022-04-18

## 2022-04-14 RX ORDER — OXYCODONE HYDROCHLORIDE 5 MG/1
2.5 TABLET ORAL EVERY 8 HOURS PRN
Qty: 8 TABLET | Refills: 0 | Status: SHIPPED | OUTPATIENT
Start: 2022-04-14 | End: 2022-04-19

## 2022-04-14 RX ORDER — DOCUSATE SODIUM 100 MG/1
100 CAPSULE, LIQUID FILLED ORAL 2 TIMES DAILY PRN
Qty: 10 CAPSULE | Refills: 0 | Status: SHIPPED | OUTPATIENT
Start: 2022-04-14 | End: 2022-06-20 | Stop reason: ALTCHOICE

## 2022-04-14 RX ORDER — POLYETHYLENE GLYCOL 3350 17 G/17G
17 POWDER, FOR SOLUTION ORAL DAILY
Qty: 85 G | Refills: 0 | Status: SHIPPED | OUTPATIENT
Start: 2022-04-15 | End: 2022-06-20 | Stop reason: ALTCHOICE

## 2022-04-14 RX ADMIN — ACETAMINOPHEN 975 MG: 325 TABLET, FILM COATED ORAL at 06:11

## 2022-04-14 RX ADMIN — B-COMPLEX W/ C & FOLIC ACID TAB 1 TABLET: TAB at 08:07

## 2022-04-14 RX ADMIN — PRAMIPEXOLE DIHYDROCHLORIDE 0.5 MG: 0.5 TABLET ORAL at 00:00

## 2022-04-14 RX ADMIN — LORATADINE 10 MG: 10 TABLET ORAL at 08:07

## 2022-04-14 RX ADMIN — CALCIUM ACETATE 667 MG: 667 CAPSULE ORAL at 08:07

## 2022-04-14 RX ADMIN — OXYCODONE HYDROCHLORIDE 5 MG: 5 TABLET ORAL at 00:00

## 2022-04-14 RX ADMIN — HYDROMORPHONE HYDROCHLORIDE 0.5 MG: 1 INJECTION, SOLUTION INTRAMUSCULAR; INTRAVENOUS; SUBCUTANEOUS at 01:12

## 2022-04-14 RX ADMIN — MAGNESIUM OXIDE TAB 400 MG (241.3 MG ELEMENTAL MG) 400 MG: 400 (241.3 MG) TAB at 08:07

## 2022-04-14 RX ADMIN — ACETAMINOPHEN 975 MG: 325 TABLET, FILM COATED ORAL at 00:00

## 2022-04-14 RX ADMIN — CEFAZOLIN SODIUM 2000 MG: 2 SOLUTION INTRAVENOUS at 04:35

## 2022-04-14 RX ADMIN — HYDROMORPHONE HYDROCHLORIDE 0.5 MG: 1 INJECTION, SOLUTION INTRAMUSCULAR; INTRAVENOUS; SUBCUTANEOUS at 08:07

## 2022-04-14 RX ADMIN — Medication 250 MG: at 08:07

## 2022-04-14 RX ADMIN — DOCUSATE SODIUM 100 MG: 100 CAPSULE ORAL at 08:07

## 2022-04-14 RX ADMIN — WARFARIN SODIUM 7.5 MG: 7.5 TABLET ORAL at 08:07

## 2022-04-14 RX ADMIN — GABAPENTIN 800 MG: 400 CAPSULE ORAL at 12:00

## 2022-04-14 RX ADMIN — GABAPENTIN 800 MG: 400 CAPSULE ORAL at 00:00

## 2022-04-14 RX ADMIN — GABAPENTIN 800 MG: 400 CAPSULE ORAL at 08:07

## 2022-04-14 NOTE — CASE MANAGEMENT
Case Management Discharge Planning Note    Patient name Mk Pair  [facility]  [address] MRN [id number]  : 1940 Date 2022       Current Admission Date: 4/10/2022  Current Admission Diagnosis:Cellulitis of right lower extremity   Patient Active Problem List    Diagnosis Date Noted    Cellulitis of right lower extremity 04/10/2022    Status post total knee replacement using cement, right 2022    Chronic kidney disease (CKD), stage II (mild) 10/06/2021    Exotropia of right eye 2021    Seasonal allergies 2021    Leg swelling 2020    Status post total knee replacement using cement, left     Lichen sclerosus et atrophicus of the vulva 2020    Colon polyps 2019    Pain in both feet 2019    Chronic edema 2019    Deep venous insufficiency 2019    Pacemaker     GERD (gastroesophageal reflux disease)     Mild cognitive impairment 2018    Vitamin D deficiency 2018    Carrero's esophagus with dysplasia 2018    Sensory polyneuropathy 2018    RLS (restless legs syndrome) 2018    Primary osteoarthritis of both knees 2018    Acquired deformity of foot 2018    Corns 2018    Diabetic polyneuropathy associated with type 2 diabetes mellitus ([address] ) 2018    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[facility] [address] ) 2018    Radiculopathy of lumbar region 2018    Atrial fibrillation ([facility] [address] ) [I48 91] 2018    Dense breast tissue on mammogram 2017    Difficulty walking 2017    Flat foot 2017    Onychomycosis 2017    Hiatal hernia     Multiple lung nodules 2015    Severe obesity (BMI 35 0-39  9) with comorbidity ([address] ) 2014    Osteopenia of multiple sites 2014    Arthritis 2014    Essential hypertension     Lichen sclerosus et atrophicus 2013    Peripheral neuropathy 2013      LOS (days): 4  Geometric Mean LOS (GMLOS) (days): 3 20  Days to GMLOS:0 2     OBJECTIVE:  Risk of Unplanned Readmission Score: 16     Current admission status: Inpatient   Preferred Pharmacy:   [address] - One [facility], [address] [place] [address] [address]  [address] [place] [address]  Phone: [phone] Fax: [phone]    Primary Care Provider: Nicolette Bucio MD    Primary Insurance: MEDICARE  Secondary Insurance: COMMERCIAL MISCELLANEOUS    DISCHARGE DETAILS:    Discharge planning discussed with[de-identified] Patient  Freedom of Choice: Yes  Comments - Freedom of Choice: Discharge planned  SW met with pt to review plans  Pt's plan is to return home with  and outpatient therapy  No discharge needs expressed by pt      Requested [address]         Is the patient interested in [address] at discharge?: No    DME Referral Provided  Referral made for DME?: No    Other Referral/Resources/Interventions Provided:  Interventions: None Indicated    Treatment Team Recommendation: Home (with outpatient therapy)  Discharge Destination Plan[de-identified] Home (with outpatient therapy)  Transport at Discharge : Family,Automobile     IMM Given (Date):: 04/13/22 (Initial)

## 2022-04-14 NOTE — PLAN OF CARE
Problem: Potential for Falls  Goal: Patient will remain free of falls  Description: INTERVENTIONS:  - Educate patient/family on patient safety including physical limitations  - Instruct patient to call for assistance with activity   - Consult OT/PT to assist with strengthening/mobility   - Keep Call bell within reach  - Keep bed low and locked with side rails adjusted as appropriate  - Keep care items and personal belongings within reach  - Initiate and maintain comfort rounds  - Make Fall Risk Sign visible to staff  - Offer Toileting every 2 Hours, in advance of need  - Initiate/Maintain bed alarm  - Obtain necessary fall risk management equipment:   - Apply yellow socks and bracelet for high fall risk patients  Outcome: Progressing     Problem: MOBILITY - ADULT  Goal: Maintain or return to baseline ADL function  Description: INTERVENTIONS:  -  Assess patient's ability to carry out ADLs; assess patient's baseline for ADL function and identify physical deficits which impact ability to perform ADLs (bathing, care of mouth/teeth, toileting, grooming, dressing, etc )  - Assess/evaluate cause of self-care deficits   - Assess range of motion  - Assess patient's mobility; develop plan if impaired  - Assess patient's need for assistive devices and provide as appropriate  - Encourage maximum independence but intervene and supervise when necessary  - Involve family in performance of ADLs  - Assess for home care needs following discharge   - Consider OT consult to assist with ADL evaluation and planning for discharge  - Provide patient education as appropriate  Outcome: Progressing     Problem: SKIN/TISSUE INTEGRITY - ADULT  Goal: Incision(s), wounds(s) or drain site(s) healing without S/S of infection  Description: INTERVENTIONS  - Assess and document dressing, incision, wound bed, drain sites and surrounding tissue  - Provide patient and family education  - Perform skin care/dressing changes   Outcome: Progressing Problem: MUSCULOSKELETAL - ADULT  Goal: Maintain or return mobility to safest level of function  Description: INTERVENTIONS:  - Assess patient's ability to carry out ADLs; assess patient's baseline for ADL function and identify physical deficits which impact ability to perform ADLs (bathing, care of mouth/teeth, toileting, grooming, dressing, etc )  - Assess/evaluate cause of self-care deficits   - Assess range of motion  - Assess patient's mobility  - Assess patient's need for assistive devices and provide as appropriate  - Encourage maximum independence but intervene and supervise when necessary  - Involve family in performance of ADLs  - Assess for home care needs following discharge   - Consider OT consult to assist with ADL evaluation and planning for discharge  - Provide patient education as appropriate  Outcome: Progressing     Problem: Prexisting or High Potential for Compromised Skin Integrity  Goal: Skin integrity is maintained or improved  Description: INTERVENTIONS:  - Identify patients at risk for skin breakdown  - Assess and monitor skin integrity  - Assess and monitor nutrition and hydration status  - Monitor labs   - Assess for incontinence   - Turn and reposition patient  - Assist with mobility/ambulation  - Relieve pressure over bony prominences  - Avoid friction and shearing  - Provide appropriate hygiene as needed including keeping skin clean and dry  - Evaluate need for skin moisturizer/barrier cream  - Collaborate with interdisciplinary team   - Patient/family teaching  - Consider wound care consult   Outcome: Progressing     Problem: PAIN - ADULT  Goal: Verbalizes/displays adequate comfort level or baseline comfort level  Description: Interventions:  - Encourage patient to monitor pain and request assistance  - Assess pain using appropriate pain scale  - Administer analgesics based on type and severity of pain and evaluate response  - Implement non-pharmacological measures as appropriate and evaluate response  - Consider cultural and social influences on pain and pain management  - Notify physician/advanced practitioner if interventions unsuccessful or patient reports new pain  Outcome: Progressing

## 2022-04-14 NOTE — DISCHARGE SUMMARY
[address]  Discharge- Katie Sher 1940, 80 y o  female MRN: [id number]  Unit/Bed#: 2 [address] Encounter: [id number]  Primary Care Provider: Edwardo Ledesma MD   Date and time admitted to hospital: 4/10/2022  6:58 AM    No new Assessment & Plan notes have been filed under this hospital service since the last note was generated  Service: Hospitalist      Medical Problems             Resolved Problems  Date Reviewed: 4/11/2022    None              Discharging Physician / Practitioner: Farhana Meng DO  PCP: Edwardo Ledesma MD  Admission Date:   Admission Orders (From admission, onward)     Ordered        04/11/22 1032  Inpatient Admission  Once            04/10/22 0902  Place in Observation  Once            04/10/22 0854  Inpatient Admission  Once,   Status:  Canceled                      Discharge Date: 04/14/22    Consultations During Hospital Stay:  · Orthopedic surgery     Procedures Performed:   · None    Significant Findings / Test Results:   Right knee xray: There is no acute fracture or dislocation  No significant joint effusion  Unremarkable appearance of total knee arthroplasty  No evidence of hardware complication  No lytic or blastic osseous lesion  Venous duplex: Right lower limb: No evidence of acute deep vein thrombosis   Left lower limb: Evaluation shows no evidence of thrombus in the common femoral vein  Reason for Admission: Cellulitis     Hospital Course:   Katie Sher is a 80 y o  female patient who originally presented to the hospital on 4/10/2022 due to right lower extremity cellulitis  Orthopedic surgery was consulted as patient is recently s/p right TKA  As per orthopedic surgery likely no deep infection nor is the joint involved  Patient was treated with IV cefazolin improved clinically and was optimized for discharge on oral antibiotics  Please see above list of diagnoses and related plan for additional information       Condition at Discharge: fair    Discharge Day Visit / Exam:   Subjective:  No acute events overnight   Vitals: Blood Pressure: 106/76 (04/14/22 0727)  Pulse: 87 (04/14/22 0727)  Temperature: 97 8 °F (36 6 °C) (04/14/22 0727)  Temp Source: Oral (04/14/22 0727)  Respirations: 19 (04/14/22 0727)  Height: 5' 4 5" (163 8 cm) (04/10/22 1059)  Weight - Scale: 103 kg (228 lb) (04/10/22 1059)  SpO2: 96 % (04/14/22 0727)  Exam:   Physical Exam  HENT:      Head: Normocephalic and atraumatic  Mouth/Throat:      Mouth: Mucous membranes are moist    Eyes:      Extraocular Movements: Extraocular movements intact  Cardiovascular:      Rate and Rhythm: Normal rate  Pulmonary:      Effort: Pulmonary effort is normal    Abdominal:      General: Abdomen is flat  Palpations: Abdomen is soft  Tenderness: There is no abdominal tenderness  Skin:     General: Skin is warm and dry  Neurological:      Mental Status: She is alert  Discharge instructions/Information to patient and family:   See after visit summary for information provided to patient and family  Provisions for Follow-Up Care:  See after visit summary for information related to follow-up care and any pertinent home health orders  Disposition:   Home    Planned Readmission: none     Discharge Statement:  I spent greater than 30 minutes discharging the patient  This time was spent on the day of discharge  I had direct contact with the patient on the day of discharge  Greater than 50% of the total time was spent examining patient, answering all patient questions, arranging and discussing plan of care with patient as well as directly providing post-discharge instructions  Additional time then spent on discharge activities  Discharge Medications:  See after visit summary for reconciled discharge medications provided to patient and/or family        **Please Note: This note may have been constructed using a voice recognition system**

## 2022-04-14 NOTE — DISCHARGE INSTRUCTIONS
Cellulitis   WHAT YOU NEED TO KNOW:   Cellulitis is a skin infection caused by bacteria  Cellulitis may go away on its own or you may need treatment  Your healthcare provider may draw a Hopi around the outside edges of your cellulitis  If your cellulitis spreads, your healthcare provider will see it outside of the [place]  DISCHARGE INSTRUCTIONS:   Call 911 if:   · You have sudden trouble breathing or chest pain  Seek care immediately if:   · Your wound gets larger and more painful  · You feel a crackling under your skin when you touch it  · You have purple dots or bumps on your skin, or you see bleeding under your skin  · You have new swelling and pain in your legs  · The red, warm, swollen area gets larger  · You see red streaks coming from the infected area  Contact your healthcare provider if:   · You have a fever  · Your fever or pain does not go away or gets worse  · The area does not get smaller after 2 days of antibiotics  · Your skin is flaking or peeling off  · You have questions or concerns about your condition or care  Medicines:   · Antibiotics  help treat the bacterial infection  · NSAIDs , such as ibuprofen, help decrease swelling, pain, and fever  NSAIDs can cause stomach bleeding or kidney problems in certain people  If you take blood thinner medicine, always ask if NSAIDs are safe for you  Always read the medicine label and follow directions  Do not give these medicines to children under 10months of age without direction from your child's healthcare provider  · Acetaminophen  decreases pain and fever  It is available without a doctor's order  Ask how much to take and how often to take it  Follow directions  Read the labels of all other medicines you are using to see if they also contain acetaminophen, or ask your doctor or pharmacist  Acetaminophen can cause liver damage if not taken correctly   Do not use more than 4 grams (4,000 milligrams) total of acetaminophen in one day  · Take your medicine as directed  Contact your healthcare provider if you think your medicine is not helping or if you have side effects  Tell him or her if you are allergic to any medicine  Keep a list of the medicines, vitamins, and herbs you take  Include the amounts, and when and why you take them  Bring the list or the pill bottles to follow-up visits  Carry your medicine list with you in case of an emergency  Self-care:   · Elevate the area above the level of your heart  as often as you can  This will help decrease swelling and pain  Prop the area on pillows or blankets to keep it elevated comfortably  · Clean the area daily until the wound scabs over  Gently wash the area with soap and water  Pat dry  Use dressings as directed  · Place cool or warm, wet cloths on the area as directed  Use clean cloths and clean water  Leave it on the area until the cloth is room temperature  Pat the area dry with a clean, dry cloth  The cloths may help decrease pain  Prevent cellulitis:   · Do not scratch bug bites or areas of injury  You increase your risk for cellulitis by scratching these areas  · Do not share personal items, such as towels, clothing, and razors  · Clean exercise equipment  with germ-killing  before and after you use it  · Wash your hands often  Use soap and water  Wash your hands after you use the bathroom, change a child's diapers, or sneeze  Wash your hands before you prepare or eat food  Use lotion to prevent dry, cracked skin  · Wear pressure stockings as directed  You may be told to wear the stockings if you have peripheral edema  The stockings improve blood flow and decrease swelling  · Treat athlete's foot  This can help prevent the spread of a bacterial skin infection  Follow up with your healthcare provider within 3 days, or as directed:   Your healthcare provider will check if your cellulitis is getting better  You may need different medicine  Write down your questions so you remember to ask them during your visits  © Copyright [address] Information is for End User's use only and may not be sold, redistributed or otherwise used for commercial purposes  All illustrations and images included in CareNotes® are the copyrighted property of A D A M , Inc  or [employer]  The above information is an  only  It is not intended as medical advice for individual conditions or treatments  Talk to your doctor, nurse or pharmacist before following any medical regimen to see if it is safe and effective for you

## 2022-04-15 ENCOUNTER — TELEPHONE (OUTPATIENT)
Dept: RHEUMATOLOGY | Facility: CLINIC | Age: 82
End: 2022-04-15

## 2022-04-15 LAB
BACTERIA BLD CULT: NORMAL
BACTERIA BLD CULT: NORMAL

## 2022-04-15 NOTE — TELEPHONE ENCOUNTER
Pt called to ask if she could put a lotion/cream on her scar, I spoke with Jessica Dumont and he advised that she should not put anything on the scar and to give it time to heal  Pt was happy that she called in to ask the question

## 2022-04-16 ENCOUNTER — AMB VIDEO VISIT (OUTPATIENT)
Dept: OTHER | Facility: HOSPITAL | Age: 82
End: 2022-04-16

## 2022-04-16 DIAGNOSIS — R50.9 FEVER, UNSPECIFIED FEVER CAUSE: Primary | ICD-10-CM

## 2022-04-16 PROCEDURE — ECARE PR SL URGENT CARE VIRTUAL VISIT: Performed by: PHYSICIAN ASSISTANT

## 2022-04-16 NOTE — PATIENT INSTRUCTIONS
-continue keflex as directed  -tylenol prn fevers  -continue to monitor  -go to ER if anything changes or worsens  -follow up with Dr Braydon Don next week

## 2022-04-16 NOTE — CARE ANYWHERE EVISITS
Visit Summary for Phil MOORE - Gender: Female - Date of Birth: [id number]  Date: [id number] - Duration: 16 minutes  Patient: Phil MOORE  Provider: Darlin Christine PA-C    Patient Contact Information  Address  [address] [place] [address]; [address] [place] [address]  [phone]    Visit Topics  Fever [Added By: Self - 2022-04-16]    Triage Questions   What is your current physical address in the event of a medical emergency? Answer []  Are you allergic to any medications? Answer []  Are you now or could you be pregnant? Answer []  Do you have any immune system compromise or chronic lung   disease? Answer []  Do you have any vulnerable family members in the home (infant, pregnant, cancer, elderly)? Answer []     Conversation Transcripts  [0A][0A] [Notification] You are connected with Darlin Christine PA-C, Urgent Care Specialist [0A][Notification] RAMON Fulton is located in [place]  [0A][Notification] RAMON Fulton has shared health history  [facility]  [0A]    Diagnosis  Fever, unspecified    Procedures  Value: [id number] Code: CPT-4 UNLISTED E&M SERVICE    Medications Prescribed    No prescriptions ordered    Electronically signed by: Margo Castillo(NPI [id number])

## 2022-04-16 NOTE — PROGRESS NOTES
Video Visit - Dao Ortiz 80 y o  female MRN: [id number]    REQUIRED DOCUMENTATION:         1  This service was provided via [facility]  2  Provider located at [address] [place] [id number]  3  [facility] provider: Orin Petit PA-C   4  Identify all parties in room with patient during [facility] visit:  No one else  5  After connecting through [employer], patient was identified by name and date of birth  Patient was then informed that this was a Telemedicine visit and that the exam was being conducted confidentially over secure lines  My office door was closed  No one else was in the room  Patient acknowledged consent and understanding of privacy and security of the Telemedicine visit  I informed the patient that I have reviewed their record in Epic and presented the opportunity for them to ask any questions regarding the visit today  The patient agreed to participate  HPI  Patient is concerned about a temp of 99 8  She is alittle over a week post op from a TKA  She developed cellulitis in the operative leg and was in the hospital for 4 days on antibiotics  She was discharged on keflex  She states she has had a fever at home for the past 24 hrs  It was as high as 99 8  She has two days left of keflex  She states that today is the first day her skin doesn't feel warm to touch  She denies any increased pain, drainage, redness  No chills, nausea, vomiting  In the hospital she did have a few instanced of urine and stool in her bed and she is worried that could be causing the skin infection  She also noticed some urinary urgency in the hospital but that has since resolved  She denies any urinary symptoms today  She also has afib and has been able to feel when she is in afib since she was admitted to the hospital  It did calm down and stop after taking her medicine tonight  She is not dizzy or lightheaded or symptomatic from it      Physical Exam  Constitutional: General: She is not in acute distress  Appearance: Normal appearance  She is not ill-appearing, toxic-appearing or diaphoretic  Pulmonary:      Effort: Pulmonary effort is normal    Skin:     Comments: Incision healing,  Scabbing present, edema around incision, no active drainage, mild erythema distal incision  Neurological:      General: No focal deficit present  Mental Status: She is alert and oriented to person, place, and time  Psychiatric:         Mood and Affect: Mood normal          Behavior: Behavior normal          Diagnoses and all orders for this visit:    Fever, unspecified fever cause    -discussed case with orthopedic doctor on call Dr Shelly Allison  He is ok with continuing keflex and monitoring over weekend  Follow up with Dr Ute Restrepo next week  Warning signs for patient to go to ER over weekend is persistent fevers, drainage, afib, increased pain  Patient Instructions   -continue keflex as directed  -tylenol prn fevers  -continue to monitor  -go to ER if anything changes or worsens  -follow up with Dr Ute Restrepo next week

## 2022-04-17 ENCOUNTER — APPOINTMENT (EMERGENCY)
Dept: RADIOLOGY | Facility: HOSPITAL | Age: 82
End: 2022-04-17
Payer: MEDICARE

## 2022-04-17 ENCOUNTER — HOSPITAL ENCOUNTER (EMERGENCY)
Facility: HOSPITAL | Age: 82
Discharge: HOME | End: 2022-04-17
Attending: EMERGENCY MEDICINE | Admitting: EMERGENCY MEDICINE
Payer: MEDICARE

## 2022-04-17 VITALS
BODY MASS INDEX: 37.99 KG/M2 | TEMPERATURE: 97.8 F | HEIGHT: 65 IN | DIASTOLIC BLOOD PRESSURE: 65 MMHG | SYSTOLIC BLOOD PRESSURE: 103 MMHG | WEIGHT: 228 LBS | RESPIRATION RATE: 18 BRPM | HEART RATE: 82 BPM | OXYGEN SATURATION: 97 %

## 2022-04-17 DIAGNOSIS — R07.9 CHEST PAIN: Primary | ICD-10-CM

## 2022-04-17 DIAGNOSIS — R79.1 SUBTHERAPEUTIC INTERNATIONAL NORMALIZED RATIO (INR): ICD-10-CM

## 2022-04-17 LAB
2HR DELTA HS TROPONIN: -1 NG/L
ALBUMIN SERPL BCP-MCNC: 3.5 G/DL (ref 3.5–5)
ALP SERPL-CCNC: 69 U/L (ref 46–116)
ALT SERPL W P-5'-P-CCNC: 13 U/L (ref 12–78)
ANION GAP SERPL CALCULATED.3IONS-SCNC: 10 MMOL/L (ref 4–13)
APTT PPP: 39 SECONDS (ref 23–37)
AST SERPL W P-5'-P-CCNC: 24 U/L (ref 5–45)
BASOPHILS # BLD AUTO: 0.06 THOUSANDS/ΜL (ref 0–0.1)
BASOPHILS NFR BLD AUTO: 1 % (ref 0–1)
BILIRUB SERPL-MCNC: 0.73 MG/DL (ref 0.2–1)
BUN SERPL-MCNC: 33 MG/DL (ref 5–25)
CALCIUM SERPL-MCNC: 9.2 MG/DL (ref 8.3–10.1)
CARDIAC TROPONIN I PNL SERPL HS: 10 NG/L
CARDIAC TROPONIN I PNL SERPL HS: 11 NG/L
CHLORIDE SERPL-SCNC: 100 MMOL/L (ref 100–108)
CO2 SERPL-SCNC: 28 MMOL/L (ref 21–32)
CREAT SERPL-MCNC: 0.99 MG/DL (ref 0.6–1.3)
EOSINOPHIL # BLD AUTO: 0.05 THOUSAND/ΜL (ref 0–0.61)
EOSINOPHIL NFR BLD AUTO: 1 % (ref 0–6)
ERYTHROCYTE [DISTWIDTH] IN BLOOD BY AUTOMATED COUNT: 14.4 % (ref 11.6–15.1)
GFR SERPL CREATININE-BSD FRML MDRD: 54 ML/MIN/1.73SQ M
GLUCOSE SERPL-MCNC: 91 MG/DL (ref 65–140)
HCT VFR BLD AUTO: 36.2 % (ref 34.8–46.1)
HGB BLD-MCNC: 11.4 G/DL (ref 11.5–15.4)
IMM GRANULOCYTES # BLD AUTO: 0.02 THOUSAND/UL (ref 0–0.2)
IMM GRANULOCYTES NFR BLD AUTO: 0 % (ref 0–2)
INR PPP: 1.79 (ref 0.84–1.19)
LIPASE SERPL-CCNC: 114 U/L (ref 73–393)
LYMPHOCYTES # BLD AUTO: 1.68 THOUSANDS/ΜL (ref 0.6–4.47)
LYMPHOCYTES NFR BLD AUTO: 22 % (ref 14–44)
MAGNESIUM SERPL-MCNC: 2.3 MG/DL (ref 1.6–2.6)
MCH RBC QN AUTO: 30.1 PG (ref 26.8–34.3)
MCHC RBC AUTO-ENTMCNC: 31.5 G/DL (ref 31.4–37.4)
MCV RBC AUTO: 96 FL (ref 82–98)
MONOCYTES # BLD AUTO: 0.83 THOUSAND/ΜL (ref 0.17–1.22)
MONOCYTES NFR BLD AUTO: 11 % (ref 4–12)
NEUTROPHILS # BLD AUTO: 5.1 THOUSANDS/ΜL (ref 1.85–7.62)
NEUTS SEG NFR BLD AUTO: 65 % (ref 43–75)
NRBC BLD AUTO-RTO: 0 /100 WBCS
NT-PROBNP SERPL-MCNC: 1080 PG/ML
PLATELET # BLD AUTO: 261 THOUSANDS/UL (ref 149–390)
PMV BLD AUTO: 10 FL (ref 8.9–12.7)
POTASSIUM SERPL-SCNC: 4.2 MMOL/L (ref 3.5–5.3)
PROT SERPL-MCNC: 7.9 G/DL (ref 6.4–8.2)
PROTHROMBIN TIME: 20.3 SECONDS (ref 11.6–14.5)
RBC # BLD AUTO: 3.79 MILLION/UL (ref 3.81–5.12)
SODIUM SERPL-SCNC: 138 MMOL/L (ref 136–145)
WBC # BLD AUTO: 7.74 THOUSAND/UL (ref 4.31–10.16)

## 2022-04-17 PROCEDURE — 85025 COMPLETE CBC W/AUTO DIFF WBC: CPT | Performed by: EMERGENCY MEDICINE

## 2022-04-17 PROCEDURE — 99285 EMERGENCY DEPT VISIT HI MDM: CPT

## 2022-04-17 PROCEDURE — 93005 ELECTROCARDIOGRAM TRACING: CPT

## 2022-04-17 PROCEDURE — 83690 ASSAY OF LIPASE: CPT | Performed by: EMERGENCY MEDICINE

## 2022-04-17 PROCEDURE — 83880 ASSAY OF NATRIURETIC PEPTIDE: CPT | Performed by: EMERGENCY MEDICINE

## 2022-04-17 PROCEDURE — 71045 X-RAY EXAM CHEST 1 VIEW: CPT

## 2022-04-17 PROCEDURE — 85730 THROMBOPLASTIN TIME PARTIAL: CPT | Performed by: EMERGENCY MEDICINE

## 2022-04-17 PROCEDURE — 83735 ASSAY OF MAGNESIUM: CPT | Performed by: EMERGENCY MEDICINE

## 2022-04-17 PROCEDURE — 36415 COLL VENOUS BLD VENIPUNCTURE: CPT | Performed by: EMERGENCY MEDICINE

## 2022-04-17 PROCEDURE — 85610 PROTHROMBIN TIME: CPT | Performed by: EMERGENCY MEDICINE

## 2022-04-17 PROCEDURE — 80053 COMPREHEN METABOLIC PANEL: CPT | Performed by: EMERGENCY MEDICINE

## 2022-04-17 PROCEDURE — 84484 ASSAY OF TROPONIN QUANT: CPT | Performed by: EMERGENCY MEDICINE

## 2022-04-17 PROCEDURE — 99285 EMERGENCY DEPT VISIT HI MDM: CPT | Performed by: EMERGENCY MEDICINE

## 2022-04-17 RX ORDER — PANTOPRAZOLE SODIUM 20 MG/1
20 TABLET, DELAYED RELEASE ORAL DAILY
Qty: 20 TABLET | Refills: 0 | Status: SHIPPED | OUTPATIENT
Start: 2022-04-17 | End: 2022-06-07 | Stop reason: SDUPTHER

## 2022-04-17 NOTE — DISCHARGE INSTRUCTIONS
-follow up with your cardiologist for an outpatient stress test  -please take 10mg of coumadin tonight, and get INR check in 2 days

## 2022-04-17 NOTE — ED NOTES
Pt assisted to in room bathroom, ambulated with one person assist  + void, returned to bed, VSS no reports of chest pain at this time or since arrival  Pt aware pending repeat labs at 4:50, spouse at side        Ally Hays RN  04/17/22 6885

## 2022-04-17 NOTE — ED NOTES
Pt cleared for discharge, instructed to increase coumadin tonight and have INR rechecked on Monday  Verbalized understanding  Pt aware to return to ed at anytime for worsening or worrisome symptoms   Assisted pt to get dressed and to [phone] Jonathan Shields RN  04/17/22 0600

## 2022-04-17 NOTE — ED PROVIDER NOTES
History  Chief Complaint   Patient presents with    Chest Pain     Patient comes via EMS from home due to chest pain that she had for an hour,but upon arrival is not having any discomfort     C/o chest pain substernal sharp lasted 10 minutes at a time, 3 episodes middle of the night  No shortness of breath, nausea,vomiting,diaphoresis, pleurisy or any other symptoms  History of atrial fibrillation,       History provided by:  Patient   used: No        Prior to Admission Medications   Prescriptions Last Dose Informant Patient Reported? Taking? Calcium Acetate, Phos Binder, (CALCIUM ACETATE PO)  Self Yes No   Sig: Take by mouth daily     Flovent  MCG/ACT inhaler  Self No No   Sig: INHALE 2 PUFFS 2 (TWO) TIMES A DAY RINSE MOUTH AFTER USE     Patient not taking: Reported on 4/10/2022    acetaminophen (TYLENOL) 325 mg tablet   No No   Sig: Take 3 tablets (975 mg total) by mouth every 8 (eight) hours   cephalexin (KEFLEX) 500 mg capsule   No No   Sig: Take 1 capsule (500 mg total) by mouth every 6 (six) hours for 4 days   clobetasol (TEMOVATE) 0 05 % ointment   No No   Sig: Apply once weekly to the affected area   docusate sodium (COLACE) 100 mg capsule   No No   Sig: Take 1 capsule (100 mg total) by mouth 2 (two) times a day   docusate sodium (COLACE) 100 mg capsule   No No   Sig: Take 1 capsule (100 mg total) by mouth 2 (two) times a day as needed for constipation for up to 5 days   famotidine (PEPCID) 40 MG tablet  Self No No   Sig: TAKE 1 TABLET BY MOUTH EVERY DAY   Patient taking differently: daily at bedtime as needed     fluticasone (FLONASE) 50 mcg/act nasal spray  Self No No   Sig: SPRAY 1 SPRAY INTO EACH NOSTRIL EVERY DAY   furosemide (LASIX) 40 mg tablet  Self No No   Sig: TAKE 1 TABLET BY MOUTH EVERY DAY   gabapentin (NEURONTIN) 800 mg tablet  Self No No   Sig: Take 1 tablet (800 mg total) by mouth 4 (four) times a day   loratadine (CLARITIN) 10 mg tablet  Self Yes No   Sig: Take 10 mg by mouth daily   magnesium 30 MG tablet  Self Yes No   Sig: Take 30 mg by mouth 2 (two) times a day   metoprolol tartrate (LOPRESSOR) 50 mg tablet   No No   Sig: Take 1 5 tablets (75 mg total) by mouth 2 (two) times a day   multivitamin (THERAGRAN) TABS  Self Yes No   Sig: Take 1 tablet by mouth daily   oxyCODONE (ROXICODONE) 5 immediate release tablet   No No   Sig: Take 0 5 tablets (2 5 mg total) by mouth every 8 (eight) hours as needed for moderate pain for up to 5 days Max Daily Amount: 7 5 mg   polyethylene glycol (MIRALAX) 17 g packet   No No   Sig: Take 17 g by mouth daily for 5 days   pramipexole (MIRAPEX) 0 5 mg tablet  Self No No   Si FULL TABLETS TWICE A DAY AT 5:00 P  M  AND 10:00 P  M    warfarin (COUMADIN) 7 5 mg tablet  Self No No   Sig: TAKE 1 TABLET BY MOUTH EVERY DAY   Patient taking differently: Last dose 3/22/22       Facility-Administered Medications: None       Past Medical History:   Diagnosis Date    Arthritis     [facility]     Carrero's esophagus     last assessed: 2018    BRCA1 negative     BRCA2 negative     [facility] )     stage 1 (left), given adjuvant radiation with Arimidex x 5 years    Cancer [facility])     Left Breast, Lumpectomy    Cataract     last assessed: 3/11/2014    Dysplasia of toenail     last assessed: 2017    Esophageal reflux     GERD (gastroesophageal reflux disease)     Gross hematuria     last assessed: 2015    Hematuria     Hiatal hernia     History of radiation therapy     Hypertension     Irregular heart beat     AFIB    Mixed sensory-motor polyneuropathy     Neuropathy     Obesity     Pacemaker     Paroxysmal atrial fibrillation (HCC)     Peripheral neuropathy     Rectal bleeding     Restless leg syndrome     Shortness of breath     last assessed: 2016       Past Surgical History:   Procedure Laterality Date    BREAST BIOPSY Left 2006    BREAST LUMPECTOMY Left 2006    onset:     BREAST SURGERY      CARDIAC PACEMAKER PLACEMENT      x 3 2006    CATARACT EXTRACTION      COLONOSCOPY      CYSTOSCOPY  04/04/2014    diagnostic    HYSTERECTOMY      ALISON BSO; due to fibroid uterus; age 38    KNEE CARTILAGE SURGERY      excision lesion of meniscus or capsule knee    KNEE SURGERY      OOPHORECTOMY Bilateral     OTHER SURGICAL HISTORY      radiation therapy    [place] COLONOSCOPY FLX DX W/COLLJ SPEC WHEN PFRMD N/A 2/8/2017    Procedure: COLONOSCOPY;  Surgeon: Marisela Elias MD;  [facility]; Service: Gastroenterology    AZ ESOPHAGOGASTRODUODENOSCOPY TRANSORAL DIAGNOSTIC N/A 9/20/2017    Procedure: ESOPHAGOGASTRODUODENOSCOPY (EGD); Surgeon: Milan Ortega MD;  [facility]; Service: Gastroenterology    AZ TOTAL KNEE ARTHROPLASTY Left 8/17/2020    Procedure: ARTHROPLASTY KNEE TOTAL;  Surgeon: Charmayne Must, DO;  Location: [address] [place] [address];  Service: Orthopedics    [place] TOTAL KNEE ARTHROPLASTY Right 3/28/2022    Procedure: ARTHROPLASTY KNEE TOTAL W ROBOT - RIGHT;  Surgeon: Charmayne Must, DO;  Location: [address] [place] [address];  Service: Orthopedics    UPPER GASTROINTESTINAL ENDOSCOPY         Family History   Problem Relation Age of Onset    Hypertension Mother     Heart disease Father     Aneurysm Father     Coronary artery disease Father         in his 76s with aneurysm    Aortic aneurysm Father         abdominal    Scleroderma Sister     Breast cancer Sister 76    Hypertension Sister     Cancer Sister     No Known Problems Son     No Known Problems Son     Testicular cancer Son 39    Thyroid cancer Son 45    Colon cancer Maternal Aunt     Colon cancer Maternal Aunt     Breast cancer Other 48        kaylee's daughter    Alcohol abuse Neg Hx     Substance Abuse Neg Hx     Mental illness Neg Hx     Depression Neg Hx      I have reviewed and agree with the history as documented      E-Cigarette/Vaping    E-Cigarette Use Never User      E-Cigarette/Vaping Substances    Nicotine No     THC No     CBD No     Flavoring No     Other No     Unknown No      Social History     Tobacco Use    Smoking status: Former Smoker     Packs/day: 1 00     Years: 25 00     Pack years: 25 00     Types: Cigarettes     Quit date: 1980     Years since quittin 6    Smokeless tobacco: Never Used    Tobacco comment: Quit over 30 years ago; quit age 41   Vaping Use    Vaping Use: Never used   Substance Use Topics    Alcohol use: Not Currently    Drug use: No       Review of Systems   Constitutional: Negative  HENT: Negative  Eyes: Negative  Respiratory: Negative  Cardiovascular: Positive for chest pain  Gastrointestinal: Negative  Endocrine: Negative  Genitourinary: Negative  Musculoskeletal: Negative  Skin: Negative  Allergic/Immunologic: Negative  Neurological: Negative  Hematological: Negative  Psychiatric/Behavioral: Negative  All other systems reviewed and are negative  Physical Exam  Physical Exam  Constitutional:       Appearance: Normal appearance  HENT:      Head: Normocephalic and atraumatic  Nose: Nose normal       Mouth/Throat:      Mouth: Mucous membranes are moist    Eyes:      Extraocular Movements: Extraocular movements intact  Pupils: Pupils are equal, round, and reactive to light  Cardiovascular:      Rate and Rhythm: Normal rate and regular rhythm  Pulmonary:      Effort: Pulmonary effort is normal       Breath sounds: Normal breath sounds  Abdominal:      General: Abdomen is flat  Bowel sounds are normal       Palpations: Abdomen is soft  Musculoskeletal:         General: Normal range of motion  Cervical back: Normal range of motion and neck supple  Skin:     General: Skin is warm  Capillary Refill: Capillary refill takes less than 2 seconds  Neurological:      General: No focal deficit present  Mental Status: She is alert and oriented to person, place, and time  Mental status is at baseline  Psychiatric:         Mood and Affect: Mood normal          Thought Content:  Thought content normal          Vital Signs  ED Triage Vitals [04/17/22 0247]   Temperature Pulse Respirations Blood Pressure SpO2   97 8 °F (36 6 °C) 83 18 135/77 99 %      Temp Source Heart Rate Source Patient Position - Orthostatic VS BP Location FiO2 (%)   Oral Monitor Lying Right arm --      Pain Score       --           Vitals:    04/17/22 0345 04/17/22 0400 04/17/22 0430 04/17/22 0530   BP:  122/68 113/70 103/65   Pulse: 76 84 80 82   Patient Position - Orthostatic VS:             Visual Acuity      ED Medications  Medications - No data to display    Diagnostic Studies  Results Reviewed     Procedure Component Value Units Date/Time    HS Troponin I 2hr [[id number]]  (Normal) Collected: 04/17/22 0456    Lab Status: Final result Specimen: Blood from Hand, Right Updated: 04/17/22 0526     hs TnI 2hr 10 ng/L      Delta 2hr hsTnI -1 ng/L     NT-BNP PRO [[id number]]  (Abnormal) Collected: 04/17/22 0253    Lab Status: Final result Specimen: Blood from Line, Venous Updated: 04/17/22 0329     NT-proBNP 1,080 pg/mL     HS Troponin 0hr (reflex protocol) [[id number]]  (Normal) Collected: 04/17/22 0253    Lab Status: Final result Specimen: Blood from Line, Venous Updated: 04/17/22 0327     hs TnI 0hr 11 ng/L     Protime-INR [[id number]]  (Abnormal) Collected: 04/17/22 0253    Lab Status: Final result Specimen: Blood from Line, Venous Updated: 04/17/22 0325     Protime 20 3 seconds      INR 1 79    APTT [[id number]]  (Abnormal) Collected: 04/17/22 0253    Lab Status: Final result Specimen: Blood from Line, Venous Updated: 04/17/22 0325     PTT 39 seconds     Comprehensive metabolic panel [[id number]]  (Abnormal) Collected: 04/17/22 0253    Lab Status: Final result Specimen: Blood from Line, Venous Updated: 04/17/22 0320     Sodium 138 mmol/L      Potassium 4 2 mmol/L      Chloride 100 mmol/L      CO2 28 mmol/L      ANION GAP 10 mmol/L      BUN 33 mg/dL Creatinine 0 99 mg/dL      Glucose 91 mg/dL      Calcium 9 2 mg/dL      AST 24 U/L      ALT 13 U/L      Alkaline Phosphatase 69 U/L      Total Protein 7 9 g/dL      Albumin 3 5 g/dL      Total Bilirubin 0 73 mg/dL      eGFR 54 ml/min/1 73sq m     Narrative:      [place] guidelines for Chronic Kidney Disease (CKD):     Stage 1 with normal or high GFR (GFR > 90 mL/min/1 73 square meters)    Stage 2 Mild CKD (GFR = 60-89 mL/min/1 73 square meters)    Stage 3A Moderate CKD (GFR = 45-59 mL/min/1 73 square meters)    Stage 3B Moderate CKD (GFR = 30-44 mL/min/1 73 square meters)    Stage 4 Severe CKD (GFR = 15-29 mL/min/1 73 square meters)    Stage 5 End Stage CKD (GFR <15 mL/min/1 73 square meters)  Note: GFR calculation is accurate only with a steady state creatinine    Lipase [[id number]]  (Normal) Collected: 04/17/22 0253    Lab Status: Final result Specimen: Blood from Line, Venous Updated: 04/17/22 0320     Lipase 114 u/L     Magnesium [[id number]]  (Normal) Collected: 04/17/22 0253    Lab Status: Final result Specimen: Blood from Line, Venous Updated: 04/17/22 0320     Magnesium 2 3 mg/dL     CBC and differential [[id number]]  (Abnormal) Collected: 04/17/22 0253    Lab Status: Final result Specimen: Blood from Line, Venous Updated: 04/17/22 0300     WBC 7 74 Thousand/uL      RBC 3 79 Million/uL      Hemoglobin 11 4 g/dL      Hematocrit 36 2 %      MCV 96 fL      MCH 30 1 pg      MCHC 31 5 g/dL      RDW 14 4 %      MPV 10 0 fL      Platelets 817 Thousands/uL      nRBC 0 /100 WBCs      Neutrophils Relative 65 %      Immat GRANS % 0 %      Lymphocytes Relative 22 %      Monocytes Relative 11 %      Eosinophils Relative 1 %      Basophils Relative 1 %      Neutrophils Absolute 5 10 Thousands/µL      Immature Grans Absolute 0 02 Thousand/uL      Lymphocytes Absolute 1 68 Thousands/µL      Monocytes Absolute 0 83 Thousand/µL      Eosinophils Absolute 0 05 Thousand/µL      Basophils Absolute 0 06 Thousands/µL                  XR chest 1 view portable   Final Result by Armando Abad MD (04/17 9077)      No acute cardiopulmonary disease                    Workstation performed: [id number]                    Procedures  ECG 12 Lead Documentation Only  Performed by: Jessica Cazares DO  Authorized by: Jessica Cazares DO     ECG reviewed by me, the ED Provider: yes    Patient location:  ED  Previous ECG:     Previous ECG:  Unavailable    Comparison to cardiac monitor: Yes    Interpretation:     Interpretation: non-specific    Rate:     ECG rate assessment: normal    Rhythm:     Rhythm: atrial fibrillation    Ectopy:     Ectopy: none    QRS:     QRS axis:  Normal  Conduction:     Conduction: normal    ST segments:     ST segments:  Non-specific  T waves:     T waves: non-specific               ED Course             HEART Risk Score      Most Recent Value   Heart Score Risk Calculator    History 0 Filed at: 04/18/2022 0658   ECG 1 Filed at: 04/18/2022 8970   Age 2 Filed at: 04/18/2022 9079   Risk Factors 1 Filed at: 04/18/2022 0658   Troponin 0 Filed at: 04/18/2022 0658   HEART Score 4 Filed at: 04/18/2022 0839                                      MDM  Number of Diagnoses or Management Options     Amount and/or Complexity of Data Reviewed  Clinical lab tests: ordered and reviewed  Tests in the radiology section of CPT®: ordered and reviewed  Tests in the medicine section of CPT®: ordered and reviewed    Patient Progress  Patient progress: stable      Disposition  Final diagnoses:   Chest pain   Subtherapeutic international normalized ratio (INR)     Time reflects when diagnosis was documented in both MDM as applicable and the Disposition within this note     Time User Action Codes Description Comment    4/17/2022  5:26 AM Cm Patino Add [R07 9] Chest pain     4/17/2022  5:31 AM Cm Patino Add [R79 1] Subtherapeutic international normalized ratio (INR)       ED Disposition     ED Disposition Condition Date/Time Comment    Discharge Stable Sun Apr 17, 2022  5:26 AM Kimberly Ortiz discharge to home/self care              Follow-up Information     Follow up With Specialties Details Why Lalit Thomas MD Internal Medicine   [address]  [address] [place] [address]  [phone]       [address] [facility] Emergency Department Emergency Medicine   [address] [facility] [address]  [address] [place] [address] Emergency Department, [place], [place], [address]          Discharge Medication List as of 4/17/2022  5:32 AM      START taking these medications    Details   pantoprazole (PROTONIX) 20 mg tablet Take 1 tablet (20 mg total) by mouth daily, Starting Sun 4/17/2022, Normal         CONTINUE these medications which have NOT CHANGED    Details   acetaminophen (TYLENOL) 325 mg tablet Take 3 tablets (975 mg total) by mouth every 8 (eight) hours, Starting Tue 3/29/2022, No Print      Calcium Acetate, Phos Binder, (CALCIUM ACETATE PO) Take by mouth daily  , Historical Med      cephalexin (KEFLEX) 500 mg capsule Take 1 capsule (500 mg total) by mouth every 6 (six) hours for 4 days, Starting Thu 4/14/2022, Until Mon 4/18/2022, Normal      clobetasol (TEMOVATE) 0 05 % ointment Apply once weekly to the affected area, Normal      !! docusate sodium (COLACE) 100 mg capsule Take 1 capsule (100 mg total) by mouth 2 (two) times a day, Starting Tue 3/29/2022, Normal      !! docusate sodium (COLACE) 100 mg capsule Take 1 capsule (100 mg total) by mouth 2 (two) times a day as needed for constipation for up to 5 days, Starting Thu 4/14/2022, Until Tue 4/19/2022 at 2359, Normal      famotidine (PEPCID) 40 MG tablet TAKE 1 TABLET BY MOUTH EVERY DAY, Normal      Flovent  MCG/ACT inhaler INHALE 2 PUFFS 2 (TWO) TIMES A DAY RINSE MOUTH AFTER USE , Starting Mon 8/23/2021, Normal      fluticasone (FLONASE) 50 mcg/act nasal spray SPRAY 1 SPRAY INTO EACH NOSTRIL EVERY DAY, Normal      furosemide (LASIX) 40 mg tablet TAKE 1 TABLET BY MOUTH EVERY DAY, Normal      gabapentin (NEURONTIN) 800 mg tablet Take 1 tablet (800 mg total) by mouth 4 (four) times a day, Starting Tue 1/11/2022, Normal      loratadine (CLARITIN) 10 mg tablet Take 10 mg by mouth daily, Historical Med      magnesium 30 MG tablet Take 30 mg by mouth 2 (two) times a day, Historical Med      metoprolol tartrate (LOPRESSOR) 50 mg tablet Take 1 5 tablets (75 mg total) by mouth 2 (two) times a day, Starting Mon 3/21/2022, Normal      multivitamin (THERAGRAN) TABS Take 1 tablet by mouth daily, Historical Med      oxyCODONE (ROXICODONE) 5 immediate release tablet Take 0 5 tablets (2 5 mg total) by mouth every 8 (eight) hours as needed for moderate pain for up to 5 days Max Daily Amount: 7 5 mg, Starting Thu 4/14/2022, Until Tue 4/19/2022 at 2359, Normal      polyethylene glycol (MIRALAX) 17 g packet Take 17 g by mouth daily for 5 days, Starting Fri 4/15/2022, Until Wed 4/20/2022, Normal      pramipexole (MIRAPEX) 0 5 mg tablet 2 FULL TABLETS TWICE A DAY AT 5:00 P  M  AND 10:00 P M , Normal      warfarin (COUMADIN) 7 5 mg tablet TAKE 1 TABLET BY MOUTH EVERY DAY, Normal       !! - Potential duplicate medications found  Please discuss with provider  Outpatient Discharge Orders   Protime-INR   Standing Status: Future Standing Exp   Date: 04/17/23       PDMP Review       Value Time User    PDMP Reviewed  Yes 4/14/2022 10:45  [facility] Hospital[facility]          ED Provider  Electronically Signed by           Jeny Yarbrough DO  04/18/22 6346

## 2022-04-17 NOTE — ED NOTES
Pt to ed via ambulance from home, reports has approx 3 episodes of mid sternal non radiating CP 10/10 lasting 3-5 min each without associated symptoms (no sob dizziness n/v) pt took a pepcid PTA  Pt has no pain at this time  On CM with afib noted ( pt has hx of same on coumadin)  Pt was recently admitted to hospital for cellulitis of RLE for which she is currently on keflex  Pt has healing wound to right knee s/p robotic total knee replacement at end of march (Dr Ute Restrepo is aware of infection)  Pt denies fever no other complaints and is pain free at this time  IV access established, cardiac monitoring continues, pt informed about plan of care  Call bell in reach bed low and locked, spouse present at side         Emily Chavez RN  04/17/22 8453

## 2022-04-18 ENCOUNTER — TELEPHONE (OUTPATIENT)
Dept: INTERNAL MEDICINE CLINIC | Facility: CLINIC | Age: 82
End: 2022-04-18

## 2022-04-18 ENCOUNTER — APPOINTMENT (OUTPATIENT)
Dept: LAB | Facility: CLINIC | Age: 82
End: 2022-04-18
Payer: MEDICARE

## 2022-04-18 ENCOUNTER — OFFICE VISIT (OUTPATIENT)
Dept: PHYSICAL THERAPY | Facility: CLINIC | Age: 82
End: 2022-04-18
Payer: MEDICARE

## 2022-04-18 DIAGNOSIS — G89.29 CHRONIC PAIN OF RIGHT KNEE: ICD-10-CM

## 2022-04-18 DIAGNOSIS — M17.11 PRIMARY OSTEOARTHRITIS OF RIGHT KNEE: Primary | ICD-10-CM

## 2022-04-18 DIAGNOSIS — M25.561 CHRONIC PAIN OF RIGHT KNEE: ICD-10-CM

## 2022-04-18 DIAGNOSIS — R79.1 SUBTHERAPEUTIC INTERNATIONAL NORMALIZED RATIO (INR): ICD-10-CM

## 2022-04-18 LAB
ATRIAL RATE: 65 BPM
INR PPP: 1.74 (ref 0.84–1.19)
PROTHROMBIN TIME: 19.6 SECONDS (ref 11.6–14.5)
QRS AXIS: 6 DEGREES
QRSD INTERVAL: 106 MS
QT INTERVAL: 364 MS
QTC INTERVAL: 447 MS
T WAVE AXIS: -24 DEGREES
VENTRICULAR RATE: 91 BPM

## 2022-04-18 PROCEDURE — 85610 PROTHROMBIN TIME: CPT

## 2022-04-18 PROCEDURE — 36415 COLL VENOUS BLD VENIPUNCTURE: CPT

## 2022-04-18 PROCEDURE — 97110 THERAPEUTIC EXERCISES: CPT

## 2022-04-18 PROCEDURE — 97530 THERAPEUTIC ACTIVITIES: CPT

## 2022-04-18 PROCEDURE — 93010 ELECTROCARDIOGRAM REPORT: CPT | Performed by: INTERNAL MEDICINE

## 2022-04-18 NOTE — TELEPHONE ENCOUNTER
I would have to look at is to see if you need more antibiotics  Keep scheduled appt with me or can move up with me or Munir Gastelum

## 2022-04-18 NOTE — PROGRESS NOTES
Daily Note     Today's date: 2022  Patient name: Ladan Click  : 1940  MRN: [id number]  Referring provider: Trevon Adam DO  Dx:   Encounter Diagnosis     ICD-10-CM    1  Primary osteoarthritis of right knee  M17 11    2  Chronic pain of right knee  M25 561     G89 29                   Subjective: Pt arrives for PT today w/ SC, recounts hospital visit, notes that she is feeling better  Notes that she went to ED yesterday for chest pain but feels better now  Objective: PROM 0-100, AROM 0-86 - edema surrounding R calf, lower and upper part of incision scabbed w/o signs of current infection  Min antalgic gait w/ decreased WB through R LE - full re-assessment to be done in next 2-3 visits  Assessment: Tolerated treatment well  Patient demonstrated fatigue post treatment and would benefit from continued PT  Fatigue but no increase in pain by end of session  Does well w/ ROM work, gentle strength and WB progressions  Will benefit from progressive increases in resistance barring setback, focus on controlled WB and ROM as sx allow  Plan: Continue per plan of care   stairs, gait and balance, check on effleurage      Eval/ Re-eval Auth #/ Referral # Total visits Start date  Expiration date Total active units  Total manual units  PT only or  PT+OT?   3-21-22 (pre-op) NA NA NA NA NA NA NA   3-31-22 (post-op)                                              Precautions: B diabetic neuropathy, DM II, L TKA 2020    Past Medical History:   Diagnosis Date    [facility] fibrillation ([facility] )     Carrero's esophagus     last assessed: 2018    BRCA1 negative     BRCA2 negative     [facility]    stage 1 (left), given adjuvant radiation with Arimidex x 5 years    Cancer [facility])     Left Breast, Lumpectomy    Cataract     last assessed: 3/11/2014    Dysplasia of toenail     last assessed: 2017    Esophageal reflux     GERD (gastroesophageal reflux disease)    Thiago Reyez hematuria     last assessed: 2/19/2015    Hematuria     Hiatal hernia     History of radiation therapy     Hypertension     Irregular heart beat     AFIB    Mixed sensory-motor polyneuropathy     Neuropathy     Obesity     Pacemaker     Paroxysmal atrial fibrillation (HCC)     Peripheral neuropathy     Rectal bleeding     Restless leg syndrome     Shortness of breath     last assessed: 1/11/2016         Date 3/21/2022  3/31/22 4/5/22 4/18/22     Visit Number IE 2 (post-op) 3 4     AROM         Knee Flexion Tested  88 72 86     Knee Extension tested -3 -3 0              Manual         Patellar mobs         STM                           TherEx         Knee/Hip PROM x2-3 mins  5' 10' x10 min as tolerated      Active w/u         [employer], glute set x10-15  rev rev x10      Ankle pumps rev rev rev x10     SLR x20   x15-20 SLR AA     Hip abduction         Knee ext: SAQ, LAQ         Heel slides  rev rev          Mini squat at bar 3x10                                                  TherAct         Patient education x5-6 mins for POC and HEP rev  HEP, PT POC, pt edu, skin check, 20' Skin check, pt edu, HEP, PT POC x30' Rev x2-3 mins     Stair negotiation    4" up/down x 10 sets     Car transfer                           Gait Training             ' total, side step at bar x 3-4 mins                       Modalities         CP  10' post  8' post

## 2022-04-18 NOTE — TELEPHONE ENCOUNTER
Pt was diagnosed with right lower extremity cellulitis  She was treated with cephalexin  She said the swelling has gone down a bit  Her leg is warm and red around the shin  She does have a low grade temp of 98 9-99  She's wondering if she should have another round of cephalexin

## 2022-04-19 ENCOUNTER — OFFICE VISIT (OUTPATIENT)
Dept: INTERNAL MEDICINE CLINIC | Facility: CLINIC | Age: 82
End: 2022-04-19
Payer: MEDICARE

## 2022-04-19 ENCOUNTER — PATIENT OUTREACH (OUTPATIENT)
Dept: INTERNAL MEDICINE CLINIC | Facility: CLINIC | Age: 82
End: 2022-04-19

## 2022-04-19 ENCOUNTER — TELEPHONE (OUTPATIENT)
Dept: CARDIOLOGY CLINIC | Facility: CLINIC | Age: 82
End: 2022-04-19

## 2022-04-19 VITALS
TEMPERATURE: 97.4 F | SYSTOLIC BLOOD PRESSURE: 126 MMHG | DIASTOLIC BLOOD PRESSURE: 74 MMHG | HEIGHT: 65 IN | HEART RATE: 74 BPM | OXYGEN SATURATION: 96 % | BODY MASS INDEX: 38.82 KG/M2 | WEIGHT: 233 LBS

## 2022-04-19 DIAGNOSIS — T14.8XXA HEMATOMA: ICD-10-CM

## 2022-04-19 DIAGNOSIS — L03.115 CELLULITIS OF RIGHT LOWER EXTREMITY: Primary | ICD-10-CM

## 2022-04-19 DIAGNOSIS — Z96.651 STATUS POST TOTAL KNEE REPLACEMENT USING CEMENT, RIGHT: ICD-10-CM

## 2022-04-19 PROCEDURE — 99495 TRANSJ CARE MGMT MOD F2F 14D: CPT | Performed by: NURSE PRACTITIONER

## 2022-04-19 NOTE — TELEPHONE ENCOUNTER
Pt called stating she was in ER on 4/17/22 for Chest Pain  Was checked out and sent home but advised to call Cardiologist to see if she needed a follow up or EKG  Pt did not have any more chest pain at time of discharge  Please advise

## 2022-04-19 NOTE — PROGRESS NOTES
Assessment/Plan:     Cellulitis of right lower extremity  Resolving on exam today  No further fevers  Finished course of keflex yesterday  No indication for more antibiotics  Advised to monitor for fevers, increased pain, redness or warmth  Status post total knee replacement using cement, right  Continue PT  Tylenol as needed  Diagnoses and all orders for this visit:    Cellulitis of right lower extremity    Status post total knee replacement using cement, right    Hematoma  Comments:  noted on abdomen from heparin injections  can apply warm compresses and continue to monitor, should improve on its own  advised to call if not improving  Subjective:     Patient ID: Luna Arteaga is a 80 y o  female  Joana Rene was admitted to the hospital from 4/10-4/14 due to cellulitis of the right lower extremity s/p recent right TKA  Ortho noted no joint involvement  She was treated with IV antibiotics and she improved clinically  She was transitioned to oral antibiotics at discharge  After discharge she developed a 99 8 fever and had a video visit  She was advised to finish keflex  Last dose of keflex was yesterday  Her leg is improving but continues to be slightly red  She is concerned she may need more antibiotics  She denies any further fevers  She was also seen in the ER on 4/17 with chest pain  Her work up was unremarkable and she was discharged home with no further chest pain  She is concerned about a lump in her left abdomen  She noticed it after the hospital  She denies any pain  Review of Systems   Constitutional: Positive for activity change  Negative for appetite change, chills and fever  Respiratory: Negative for shortness of breath  Cardiovascular: Positive for leg swelling  Negative for chest pain and palpitations  Genitourinary: Negative for dysuria  Musculoskeletal: Positive for arthralgias  Skin: Positive for color change and wound     Neurological: Negative for dizziness, light-headedness and headaches  Objective:     Physical Exam  Vitals reviewed  Constitutional:       Appearance: Normal appearance  HENT:      Head: Normocephalic and atraumatic  Eyes:      Conjunctiva/sclera: Conjunctivae normal    Cardiovascular:      Rate and Rhythm: Normal rate and regular rhythm  Pulses: Normal pulses  Heart sounds: Normal heart sounds  Pulmonary:      Effort: Pulmonary effort is normal       Breath sounds: Normal breath sounds  Abdominal:       Musculoskeletal:      Right lower leg: Edema present  Skin:     General: Skin is warm and dry  Neurological:      Mental Status: She is alert and oriented to person, place, and time  Psychiatric:         Mood and Affect: Mood normal          Behavior: Behavior normal            Vitals:    04/19/22 1130   BP: 126/74   Pulse: 74   Temp: (!) 97 4 °F (36 3 °C)   SpO2: 96%   Weight: 106 kg (233 lb)   Height: 5' 4 5" (1 638 m)       Transitional Care Management Review:  Guillermo Amin is a 80 y o  female here for TCM follow up  During the TCM phone call patient stated:    TCM Call (since 3/19/2022)     Date and time call was made  4/14/2022  3:02 PM    Hospital care reviewed  Records reviewed        Date of Admission  04/10/22    Date of discharge  04/14/22    Diagnosis  Cellulitis of right lower extremity    Disposition  Home    Were the patients medications reviewed and updated  Yes    Current Symptoms  None      TCM Call (since 3/19/2022)     Post hospital issues  None    Should patient be enrolled in anticoag monitoring? Yes    Scheduled for follow up? Yes    Did you obtain your prescribed medications  Yes    Do you need help managing your prescriptions or medications  No    Is transportation to your appointment needed  No    I have advised the patient to call PCP with any new or worsening symptoms  [place]    Living Arrangements  Spouse or Significiant other    Support System  Family;  Spouse The type of support provided  None    Do you have social support  Yes, as much as I need    Are you recieving any outpatient services  No    Are you recieving home care services  No    Are you using any community resources  No          RICKY Tang

## 2022-04-19 NOTE — PROGRESS NOTES
Patient went to the ED with chest pain which resolved  PT/INR was found to be subtherapeutic, no changes to warfarin dose noted  PT/INR was repeated yesterday  No change  Patient saw her PCP today & RLE cellulitis is resolving  Warm compresses recommended for abdominal hematoma secondary to heparin shots  Call placed to the patient  Left a voicemail  Call back number & office hours provided

## 2022-04-19 NOTE — ASSESSMENT & PLAN NOTE
Resolving on exam today  No further fevers  Finished course of keflex yesterday  No indication for more antibiotics  Advised to monitor for fevers, increased pain, redness or warmth

## 2022-04-21 ENCOUNTER — OFFICE VISIT (OUTPATIENT)
Dept: PHYSICAL THERAPY | Facility: CLINIC | Age: 82
End: 2022-04-21
Payer: MEDICARE

## 2022-04-21 ENCOUNTER — TELEPHONE (OUTPATIENT)
Dept: INTERNAL MEDICINE CLINIC | Facility: CLINIC | Age: 82
End: 2022-04-21

## 2022-04-21 DIAGNOSIS — M17.11 PRIMARY OSTEOARTHRITIS OF RIGHT KNEE: Primary | ICD-10-CM

## 2022-04-21 DIAGNOSIS — G89.29 CHRONIC PAIN OF RIGHT KNEE: ICD-10-CM

## 2022-04-21 DIAGNOSIS — M25.561 CHRONIC PAIN OF RIGHT KNEE: ICD-10-CM

## 2022-04-21 PROCEDURE — 97530 THERAPEUTIC ACTIVITIES: CPT

## 2022-04-21 PROCEDURE — 97110 THERAPEUTIC EXERCISES: CPT

## 2022-04-21 NOTE — PROGRESS NOTES
Daily Note     Today's date: 2022  Patient name: Chava Shah  : 1940  MRN: [id number]  Referring provider: Abraham Bumpers, DO  Dx:   Encounter Diagnosis     ICD-10-CM    1  Primary osteoarthritis of right knee  M17 11    2  Chronic pain of right knee  M25 561     G89 29                   Subjective: Pt arrives today and notes that she is worried  Felt good after lats session but feels that infection is not going away  Has ortho appt tomorrow  Thinks that entire situation is getting to her mentally  Had some shooting pain in R knee earlier this morning but feels better now  Objective: R knee PROM 0-108, AROM 0-98 - min pain at end range flexion for both   -ambulates w/ walker, min forward flexion through tspine - corrects when cued   -incision healing, improved since  visit, generally nontender through lower R calf, slight increase in warmth as compared to L side   -require min/modA during SLR work - fair quad set on R       Assessment: Tolerated treatment well  Patient demonstrated fatigue post treatment and would benefit from continued PT  Does well w/ gentle session today  Pt leg does appear to be healing as compared to most recent visit  I encouraged her to take daily pictures at multiple angles to help track progress of healing  Verbalizes understanding  Will start w/ effleurage and soft tissue work through [address] if cleared by ortho at tomorrow's visit  Fatigues quickly w/ WB exercises, this will be a point of emphasis moving forward  Pt educated on active rest and need to avoid prolonged inactivity  Good understanding  Increase intensity as able  Plan: Continue per plan of care   check MD note, stairs, gait progressions, NU step or bike     Eval/ Re-eval Auth #/ Referral # Total visits Start date  Expiration date Total active units  Total manual units  PT only or  PT+OT?   3-21-22 (pre-op) NA NA NA NA NA NA NA   3-31-22 (post-op) Precautions: B diabetic neuropathy, DM II, L TKA August 2020    Past Medical History:   Diagnosis Date    Atrial fibrillation ([facility] [address] )     Carrero's esophagus     last assessed: 1/23/2018    BRCA1 negative     BRCA2 negative     Breast cancer ([facility] [address] ) 2006    stage 1 (left), given adjuvant radiation with Arimidex x 5 years    Cancer [facility]) 2006    Left Breast, Lumpectomy    Cataract     last assessed: 3/11/2014    Dysplasia of toenail     last assessed: 8/29/2017    Esophageal reflux     GERD (gastroesophageal reflux disease)     Gross hematuria     last assessed: 2/19/2015    Hematuria     Hiatal hernia     History of radiation therapy     Hypertension     Irregular heart beat     AFIB    Mixed sensory-motor polyneuropathy     Neuropathy     Obesity     Pacemaker     Paroxysmal atrial fibrillation (HCC)     Peripheral neuropathy     Rectal bleeding     Restless leg syndrome     Shortness of breath     last assessed: 1/11/2016         Date 3/21/2022  3/31/22 4/5/22 4/18/22 4/21/22    Visit Number IE 2 (post-op) 3 4 5    AROM         Knee Flexion Tested  88 72 86 98    Knee Extension tested -3 -3 0 0             Manual         Patellar mobs         STM                           TherEx         Knee/Hip PROM x2-3 mins  5' 10' x10 min as tolerated  x8 mins total same    Active w/u         Quad set, glute set x10-15  rev rev x10  x20 total     Ankle pumps rev rev rev x10     SLR x20   x15-20 SLR AA AA x15 total    SAQ x30    Hip abduction         Knee ext: SAQ, LAQ         Heel slides  rev rev          Mini squat at bar 3x10 Same x 10 total         HS and glute/piri stretching x6-7 mins          Bridging x 20 total                               TherAct         Patient education x5-6 mins for POC and HEP rev  HEP, PT POC, pt edu, skin check, 20' Skin check, pt edu, HEP, PT POC x30' Rev x2-3 mins Rev x5 mins     Stair negotiation    4" up/down x 10 sets     Car transfer              exag march at bar x2-3 laps, side step x 2-3 laps              Gait Training             ' total, side step at bar x 3-4 mins                       Modalities         CP  10' post  8' post Home

## 2022-04-21 NOTE — TELEPHONE ENCOUNTER
Pain has gotten better, pt is elevating her legs, still red, pt has appt with ortho tomorrow  Pt also went to PT today as well, felt a bit better after that

## 2022-04-21 NOTE — TELEPHONE ENCOUNTER
Pt  was seen in our office on 4/19  Pt  concerned because redness in all the way up her rt  thigh from her ankle and her leg doesn't feel any better  Swelling is the same as when it started and it hurts

## 2022-04-21 NOTE — TELEPHONE ENCOUNTER
When Jannet Granda saw you, the redness had improved since you were discharged from the hospital     Are you still having a lot of pain? When is your follow up appointment with Ortho?

## 2022-04-22 ENCOUNTER — TELEPHONE (OUTPATIENT)
Dept: OBGYN CLINIC | Facility: HOSPITAL | Age: 82
End: 2022-04-22

## 2022-04-22 ENCOUNTER — OFFICE VISIT (OUTPATIENT)
Dept: OBGYN CLINIC | Facility: CLINIC | Age: 82
End: 2022-04-22

## 2022-04-22 VITALS — HEIGHT: 65 IN | BODY MASS INDEX: 38.42 KG/M2 | WEIGHT: 230.6 LBS

## 2022-04-22 DIAGNOSIS — L53.9 ERYTHEMA: ICD-10-CM

## 2022-04-22 DIAGNOSIS — Z96.651 STATUS POST TOTAL KNEE REPLACEMENT USING CEMENT, RIGHT: ICD-10-CM

## 2022-04-22 DIAGNOSIS — R60.0 EDEMA OF RIGHT LOWER LEG: Primary | ICD-10-CM

## 2022-04-22 PROCEDURE — 99024 POSTOP FOLLOW-UP VISIT: CPT | Performed by: ORTHOPAEDIC SURGERY

## 2022-04-22 RX ORDER — OXYCODONE HYDROCHLORIDE 5 MG/1
TABLET ORAL
Qty: 30 TABLET | Refills: 0 | Status: SHIPPED | OUTPATIENT
Start: 2022-04-22 | End: 2022-06-20 | Stop reason: ALTCHOICE

## 2022-04-22 NOTE — TELEPHONE ENCOUNTER
Spoke to patient, she understands she's allowed to remove it tomorrow and to keep it dry and what to do if she notices drainage

## 2022-04-22 NOTE — TELEPHONE ENCOUNTER
Patient states she has 2 gauze bandages and a wrap around her knee  Patient is asking if these bandages and wrap could be removed to get air or is she to keep it on the entire time till she sees you next week?

## 2022-04-22 NOTE — PROGRESS NOTES
Assessment/Plan:  1  Edema of right lower leg     2  Erythema     3  Status post total knee replacement using cement, right  Ambulatory referral to Orthopedic Surgery    oxyCODONE (Roxicodone) 5 immediate release tablet     Darling Todd is a pleasant 62-year-old female presenting today for follow-up 4 weeks after a robotic assisted right total knee arthroplasty  The cellulitis and edema in her leg has significantly improved from when she was hospitalized last week  The current hyperemia of her leg does not display any significant warmth compared to the contralateral side and there are no palpable fluid collections  We do not feel that she has a persistent cellulitis at this time, but rather the hyperemia of her lower extremities due to her circulatory issues  We do not feel that she needs further antibiotics at this time  We did strongly encourage her to strictly elevate the leg to help reduce the edema  She should continue with her Coumadin until her INR is therapeutic  We would like her to keep the incision dry for the next week, but again do not feel there is any sign of infection  We would like to see her back next week for a clinical re-evaluation  She will not need x-rays at that time  She may continue her efforts with physical therapy and her home exercise program in the interim  All questions addressed    Subjective: wound check right knee    Patient ID: Migdalia García is a 80 y o  female  Darling Todd is a very pleasant 62-year-old female presenting today follow-up 4 weeks after undergoing robotic assisted right total arthroplasty  She was briefly hospitalized for cellulitis treated with IV antibiotics  She was discharged on oral antibiotics and has now completed that course  She is concerned because her leg is still swollen and red  She has tried elevating, but admits that she has struggled to adequately elevate    She is continue with her efforts with physical therapy and notes that the pain in her knee is improving, but she does still have pain in the posterior aspect of the knee  She did take a shower today for the 1st time since surgery but otherwise has kept the incision dry    Review of Systems   Constitutional: Positive for activity change  HENT: Negative  Eyes: Negative  Respiratory: Negative  Cardiovascular: Positive for leg swelling  Gastrointestinal: Negative  Endocrine: Negative  Genitourinary: Negative  Musculoskeletal: Positive for arthralgias, gait problem, joint swelling and myalgias  Skin: Positive for color change  Allergic/Immunologic: Negative  Hematological: Negative  Psychiatric/Behavioral: Negative        Past Medical History:   Diagnosis Date    Arthritis     Atrial fibrillation ([facility] [address] )     Carrero's esophagus     last assessed: 1/23/2018    BRCA1 negative     BRCA2 negative     [facility] 2006    stage 1 (left), given adjuvant radiation with Arimidex x 5 years    Cancer [facility]) 2006    Left Breast, Lumpectomy    Cataract     last assessed: 3/11/2014    Dysplasia of toenail     last assessed: 8/29/2017    Esophageal reflux     GERD (gastroesophageal reflux disease)     Gross hematuria     last assessed: 2/19/2015    Hematuria     Hiatal hernia     History of radiation therapy     Hypertension     Irregular heart beat     AFIB    Mixed sensory-motor polyneuropathy     Neuropathy     Obesity     Pacemaker     Paroxysmal atrial fibrillation (HCC)     Peripheral neuropathy     Rectal bleeding     Restless leg syndrome     Shortness of breath     last assessed: 1/11/2016       Past Surgical History:   Procedure Laterality Date    BREAST BIOPSY Left 2006    BREAST LUMPECTOMY Left 2006    onset: 2006    BREAST SURGERY      CARDIAC PACEMAKER PLACEMENT      x 3 2006    CATARACT EXTRACTION      COLONOSCOPY      CYSTOSCOPY  04/04/2014    diagnostic    HYSTERECTOMY      ALISON BSO; due to fibroid uterus; age 38   Junius Plata KNEE CARTILAGE SURGERY      excision lesion of meniscus or capsule knee    KNEE SURGERY      OOPHORECTOMY Bilateral     OTHER SURGICAL HISTORY      radiation therapy    NV COLONOSCOPY FLX DX W/COLLJ SPEC WHEN PFRMD N/A 2017    Procedure: COLONOSCOPY;  Surgeon: Michael Lema MD;  Location: [facility]; Service: Gastroenterology    NV ESOPHAGOGASTRODUODENOSCOPY TRANSORAL DIAGNOSTIC N/A 2017    Procedure: ESOPHAGOGASTRODUODENOSCOPY (EGD); Surgeon: Bryon Fisher MD;  Location: [facility];   Service: Gastroenterology    NV TOTAL KNEE ARTHROPLASTY Left 2020    Procedure: ARTHROPLASTY KNEE TOTAL;  Surgeon: Carina Crews DO;  Location: [address] [place] [address];  Service: Orthopedics    NV TOTAL KNEE ARTHROPLASTY Right 3/28/2022    Procedure: ARTHROPLASTY KNEE TOTAL W ROBOT - RIGHT;  Surgeon: Carina Crews DO;  Location: [address] [place] [address];  Service: Orthopedics    UPPER GASTROINTESTINAL ENDOSCOPY         Family History   Problem Relation Age of Onset    Hypertension Mother     Heart disease Father     Aneurysm Father     Coronary artery disease Father         in his 76s with aneurysm    Aortic aneurysm Father         abdominal    Scleroderma Sister     Breast cancer Sister 76    Hypertension Sister     Cancer Sister     No Known Problems Son     No Known Problems Son     Testicular cancer Son 39    Thyroid cancer Son 45    Colon cancer Maternal Aunt     Colon cancer Maternal Aunt     Breast cancer Other 48        kaylee's daughter    Alcohol abuse Neg Hx     Substance Abuse Neg Hx     Mental illness Neg Hx     Depression Neg Hx        Social History     Occupational History    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      Comment: retired   Tobacco Use    Smoking status: Former Smoker     Packs/day: 1 00     Years: 25 00     Pack years: 25 00     Types: Cigarettes     Quit date: 1980     Years since quittin 6    Smokeless tobacco: Never Used    Tobacco comment: Quit over 30 years ago; quit age 41   Vaping Use    Vaping Use: Never used   Substance and Sexual Activity    Alcohol use: Not Currently    Drug use: No    Sexual activity: Not on file         Current Outpatient Medications:     acetaminophen (TYLENOL) 325 mg tablet, Take 3 tablets (975 mg total) by mouth every 8 (eight) hours, Disp: , Rfl: 0    Calcium Acetate, Phos Binder, (CALCIUM ACETATE PO), Take by mouth daily  , Disp: , Rfl:     clobetasol (TEMOVATE) 0 05 % ointment, Apply once weekly to the affected area, Disp: 30 g, Rfl: 3    docusate sodium (COLACE) 100 mg capsule, Take 1 capsule (100 mg total) by mouth 2 (two) times a day, Disp: 60 capsule, Rfl: 0    docusate sodium (COLACE) 100 mg capsule, Take 1 capsule (100 mg total) by mouth 2 (two) times a day as needed for constipation for up to 5 days, Disp: 10 capsule, Rfl: 0    famotidine (PEPCID) 40 MG tablet, TAKE 1 TABLET BY MOUTH EVERY DAY (Patient taking differently: daily at bedtime as needed  ), Disp: 90 tablet, Rfl: 1    Flovent  MCG/ACT inhaler, INHALE 2 PUFFS 2 (TWO) TIMES A DAY RINSE MOUTH AFTER USE  (Patient not taking: Reported on 4/10/2022 ), Disp: 12 g, Rfl: 1    fluticasone (FLONASE) 50 mcg/act nasal spray, SPRAY 1 SPRAY INTO EACH NOSTRIL EVERY DAY, Disp: 16 mL, Rfl: 1    furosemide (LASIX) 40 mg tablet, TAKE 1 TABLET BY MOUTH EVERY DAY, Disp: 90 tablet, Rfl: 3    gabapentin (NEURONTIN) 800 mg tablet, Take 1 tablet (800 mg total) by mouth 4 (four) times a day, Disp: 360 tablet, Rfl: 1    loratadine (CLARITIN) 10 mg tablet, Take 10 mg by mouth daily, Disp: , Rfl:     magnesium 30 MG tablet, Take 30 mg by mouth 2 (two) times a day, Disp: , Rfl:     metoprolol tartrate (LOPRESSOR) 50 mg tablet, Take 1 5 tablets (75 mg total) by mouth 2 (two) times a day, Disp: 180 tablet, Rfl: 3    multivitamin (THERAGRAN) TABS, Take 1 tablet by mouth daily, Disp: , Rfl:     oxyCODONE (Roxicodone) 5 immediate release tablet, Take 1/2 to 1 tablets by mouth every 4-6 hours as needed for pain, Disp: 30 tablet, Rfl: 0    pantoprazole (PROTONIX) 20 mg tablet, Take 1 tablet (20 mg total) by mouth daily, Disp: 20 tablet, Rfl: 0    polyethylene glycol (MIRALAX) 17 g packet, Take 17 g by mouth daily for 5 days, Disp: 85 g, Rfl: 0    pramipexole (MIRAPEX) 0 5 mg tablet, 2 FULL TABLETS TWICE A DAY AT 5:00 P  M  AND 10:00 P M , Disp: 360 tablet, Rfl: 1    warfarin (COUMADIN) 7 5 mg tablet, TAKE 1 TABLET BY MOUTH EVERY DAY (Patient taking differently: Last dose 3/22/22 ), Disp: 90 tablet, Rfl: 3    Allergies   Allergen Reactions    Latex      Added based on information entered during case entry, please review and add reactions, type, and severity as needed    Cephalexin Rash    Duloxetine Hcl Other (See Comments)     Facial pins and needles sensation    Erythromycin Rash    Levofloxacin Other (See Comments)     Muscular aches    Penicillins Rash    Savella [Milnacipran] Rash    Sulfa Antibiotics Rash       Objective: There were no vitals filed for this visit  Body mass index is 38 97 kg/m²  Right Knee Exam     Muscle Strength   The patient has normal right knee strength  Tenderness   The patient is experiencing tenderness in the medial retinaculum, lateral retinaculum and medial hamstring  Range of Motion   Extension:  0 normal   Flexion:  110 abnormal     Tests   Varus: negative Valgus: negative  Drawer:  Anterior - negative        Other   Erythema: present  Scars: present  Sensation: normal  Pulse: present  Swelling: mild  Effusion: no effusion present    Comments:  Moderate edema and ecchymosis from thigh to right ankle  Anterior incision well approximated without signs dehiscence, erythema, warmth    We did sterilely debride around the wound and there is no sign of dehiscence or infection  No obvious soft tissue palpable hematoma  Ambulates with an antalgic gait with use of a walker  Prosthesis stable to stressing of the available range of motion  Extensor mechanism intact          Observations     Right Knee   Negative for effusion  Physical Exam  Vitals and nursing note reviewed  Constitutional:       Appearance: She is well-developed  Comments: Body mass index is 38 97 kg/m²  HENT:      Head: Normocephalic and atraumatic  Right Ear: External ear normal       Left Ear: External ear normal    Cardiovascular:      Rate and Rhythm: Normal rate  Pulses: Normal pulses  Pulmonary:      Effort: Pulmonary effort is normal    Abdominal:      Palpations: Abdomen is soft  Musculoskeletal:      Cervical back: Normal range of motion  Right knee: No effusion  Comments: See ortho exam   Skin:     General: Skin is warm and dry  Neurological:      General: No focal deficit present  Mental Status: She is alert and oriented to person, place, and time  Mental status is at baseline  Psychiatric:         Mood and Affect: Mood normal          Behavior: Behavior normal          Thought Content:  Thought content normal          Judgment: Judgment normal

## 2022-04-22 NOTE — TELEPHONE ENCOUNTER
She may remove it tomorrow and leave the incision open to air  We do want her to keep the incision dry until she returns  If she notices any drainage, she should reapply sterile dressing and Ace bandage

## 2022-04-25 ENCOUNTER — TELEPHONE (OUTPATIENT)
Dept: OBGYN CLINIC | Facility: CLINIC | Age: 82
End: 2022-04-25

## 2022-04-25 ENCOUNTER — APPOINTMENT (OUTPATIENT)
Dept: PHYSICAL THERAPY | Facility: CLINIC | Age: 82
End: 2022-04-25
Payer: MEDICARE

## 2022-04-25 ENCOUNTER — TELEPHONE (OUTPATIENT)
Dept: INTERNAL MEDICINE CLINIC | Facility: CLINIC | Age: 82
End: 2022-04-25

## 2022-04-25 NOTE — TELEPHONE ENCOUNTER
Left VM for pt to call office back.   Patient would like you to send her to a dermatologist to look at her cellulitis to make sure its getting better  I believe she does not need to see Dermatology   She would see you or PCP no?

## 2022-04-25 NOTE — TELEPHONE ENCOUNTER
Pt  called  Didn't go to PT because she feels exhausted and her rt  Leg hurts  It is less swollen, still red all the way up her leg-color is better, still sensitive to touch on bottom of leg

## 2022-04-25 NOTE — TELEPHONE ENCOUNTER
Patient questioning if she should see an infectious doctor, she is afraid of what it can turn into    She would like it checked out

## 2022-04-25 NOTE — TELEPHONE ENCOUNTER
The redness might just be the skin healing and may depend on circulation  Make sure you try to keep your legs elevated  You saw Ortho last week, after Munir Gastelum and they did not think you still have the infection also  Keep your appt with them tomorrow

## 2022-04-26 NOTE — TELEPHONE ENCOUNTER
Patient said she is bouncing back and forth  We tell her pcp and pcp tells her to see us  She does have an appointment tomorrow with you so she stated she will keep that

## 2022-04-26 NOTE — TELEPHONE ENCOUNTER
Please recommend office visit with PCP, not a virtual as she did previously    It improving on our last eval

## 2022-04-27 ENCOUNTER — OFFICE VISIT (OUTPATIENT)
Dept: OBGYN CLINIC | Facility: CLINIC | Age: 82
End: 2022-04-27

## 2022-04-27 DIAGNOSIS — M79.604 RIGHT LEG PAIN: ICD-10-CM

## 2022-04-27 DIAGNOSIS — Z96.651 STATUS POST TOTAL RIGHT KNEE REPLACEMENT USING CEMENT: Primary | ICD-10-CM

## 2022-04-27 DIAGNOSIS — R60.0 EDEMA OF RIGHT LOWER LEG: ICD-10-CM

## 2022-04-27 DIAGNOSIS — T81.89XD DELAYED SURGICAL WOUND HEALING, SUBSEQUENT ENCOUNTER: ICD-10-CM

## 2022-04-27 PROCEDURE — 99024 POSTOP FOLLOW-UP VISIT: CPT | Performed by: ORTHOPAEDIC SURGERY

## 2022-04-27 NOTE — PROGRESS NOTES
Assessment/Plan:  1  Status post total right knee replacement using cement     2  Edema of right lower leg     3  Right leg pain         81 yo F 4 weeks after right total knee replacement  Her cellulitis continues to improve  There is a small area of delayed wound healing, with necrosis to inferior incision that also continues to improve in appearance  She says the erythema and swelling to her right lower leg postoperatively have improved since she was seen last week even though she is no longer on antibiotics  Her doppler from prior admission showed no lower extremity DVT at that time  Recommend continued observation at this time with repeat followup in 2 weeks  She should return to physical therapy without restrictions, weight bearing to tolerance  Would recommend compressive stockinette to right lower to help with swelling which patient has at home already  She should continue her DVT prophylaxis as previously prescribed  Return for recheck sooner if wound begins draining, redness begins to spread, pain worsens, or fever develops  Subjective:     Patient ID: Carl Ahmadi is a 80 y o  female  Who is 1 month postop from right total knee replacement  Overall she is doing well from a recovery perspective and is happy with her progress except for the lower leg swelling redness and warmth  The lower leg is tender with pressure and in calf when she elevates the lower leg at night  She notes that she is ambulating with a cane  She is taking her coumadin as prescribed and working with physical therapy  She denies fevers chills chest pain shortness of breath  Does still have intermittent lower extremity swelling to right lower extremity which she was asked to return for wound check today  She denies wound drainage  Review of Systems   Constitutional: Negative for chills and fever  HENT: Negative for ear pain, sinus pain and sore throat  Eyes: Negative for pain and visual disturbance     Respiratory: Negative for cough and shortness of breath  Cardiovascular: Positive for leg swelling (Right lower extremity)  Negative for chest pain  Gastrointestinal: Negative for abdominal pain, nausea and vomiting  Endocrine: Negative for cold intolerance and heat intolerance  Genitourinary: Negative for difficulty urinating and dysuria  Musculoskeletal: Positive for myalgias (Posterior calf )  Skin: Positive for color change (Erythema to right lower extremity below knee) and wound (Right anterior knee surgical incision mild area of scabbing inferiorly)  Negative for rash  Allergic/Immunologic: Negative for environmental allergies and food allergies  Neurological: Negative for weakness, numbness and headaches  Hematological: Does not bruise/bleed easily           Past Medical History:   Diagnosis Date    Arthritis     Atrial fibrillation ([facility] )     Carrero's esophagus     last assessed: 1/23/2018    BRCA1 negative     BRCA2 negative     Breast cancer ([facility] ) 2006    stage 1 (left), given adjuvant radiation with Arimidex x 5 years    Cancer [facility]) 2006    Left Breast, Lumpectomy    Cataract     last assessed: 3/11/2014    Dysplasia of toenail     last assessed: 8/29/2017    Esophageal reflux     GERD (gastroesophageal reflux disease)     Gross hematuria     last assessed: 2/19/2015    Hematuria     Hiatal hernia     History of radiation therapy     Hypertension     Irregular heart beat     AFIB    Mixed sensory-motor polyneuropathy     Neuropathy     Obesity     Pacemaker     Paroxysmal atrial fibrillation (HCC)     Peripheral neuropathy     Rectal bleeding     Restless leg syndrome     Shortness of breath     last assessed: 1/11/2016       Past Surgical History:   Procedure Laterality Date    BREAST BIOPSY Left 2006    BREAST LUMPECTOMY Left 2006    onset: 2006    BREAST SURGERY      CARDIAC PACEMAKER PLACEMENT      x 3 2006    CATARACT EXTRACTION      COLONOSCOPY      CYSTOSCOPY 04/04/2014    diagnostic    HYSTERECTOMY      ALISON BSO; due to fibroid uterus; age 38    KNEE CARTILAGE SURGERY      excision lesion of meniscus or capsule knee    KNEE SURGERY      OOPHORECTOMY Bilateral     OTHER SURGICAL HISTORY      radiation therapy    MI COLONOSCOPY FLX DX W/COLLJ SPEC WHEN PFRMD N/A 2/8/2017    Procedure: COLONOSCOPY;  Surgeon: Janeen Elizabeth MD;  [facility]: [facility]; Service: Gastroenterology    MI ESOPHAGOGASTRODUODENOSCOPY TRANSORAL DIAGNOSTIC N/A 9/20/2017    Procedure: ESOPHAGOGASTRODUODENOSCOPY (EGD); Surgeon: Justin Maynard MD;  [facility];   Service: Gastroenterology    MI TOTAL KNEE ARTHROPLASTY Left 8/17/2020    Procedure: ARTHROPLASTY KNEE TOTAL;  Surgeon: Lala Preston DO;  Location: [address] [place] [address];  Service: Orthopedics    MI TOTAL KNEE ARTHROPLASTY Right 3/28/2022    Procedure: ARTHROPLASTY KNEE TOTAL W ROBOT - RIGHT;  Surgeon: Lala Preston DO;  Location: [address] [place] [address];  Service: Orthopedics    UPPER GASTROINTESTINAL ENDOSCOPY         Family History   Problem Relation Age of Onset    Hypertension Mother     Heart disease Father     Aneurysm Father     Coronary artery disease Father         in his 76s with aneurysm    Aortic aneurysm Father         abdominal    Scleroderma Sister     Breast cancer Sister 76    Hypertension Sister     Cancer Sister     No Known Problems Son     No Known Problems Son     Testicular cancer Son 39    Thyroid cancer Son 45    Colon cancer Maternal Aunt     Colon cancer Maternal Aunt     Breast cancer Other 48        kaylee's daughter    Alcohol abuse Neg Hx     Substance Abuse Neg Hx     Mental illness Neg Hx     Depression Neg Hx        Social History     Occupational History    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     Comment: retired   Tobacco Use    Smoking status: Former Smoker     Packs/day: 1 00     Years: 25 00     Pack years: 25 00     Types: Cigarettes     Quit date: 9/20/1980     Years since quittin 6    Smokeless tobacco: Never Used    Tobacco comment: Quit over 30 years ago; quit age 41   Vaping Use    Vaping Use: Never used   Substance and Sexual Activity    Alcohol use: Not Currently    Drug use: No    Sexual activity: Not on file         Current Outpatient Medications:     acetaminophen (TYLENOL) 325 mg tablet, Take 3 tablets (975 mg total) by mouth every 8 (eight) hours, Disp: , Rfl: 0    Calcium Acetate, Phos Binder, (CALCIUM ACETATE PO), Take by mouth daily  , Disp: , Rfl:     clobetasol (TEMOVATE) 0 05 % ointment, Apply once weekly to the affected area, Disp: 30 g, Rfl: 3    docusate sodium (COLACE) 100 mg capsule, Take 1 capsule (100 mg total) by mouth 2 (two) times a day, Disp: 60 capsule, Rfl: 0    docusate sodium (COLACE) 100 mg capsule, Take 1 capsule (100 mg total) by mouth 2 (two) times a day as needed for constipation for up to 5 days, Disp: 10 capsule, Rfl: 0    famotidine (PEPCID) 40 MG tablet, TAKE 1 TABLET BY MOUTH EVERY DAY (Patient taking differently: daily at bedtime as needed  ), Disp: 90 tablet, Rfl: 1    Flovent  MCG/ACT inhaler, INHALE 2 PUFFS 2 (TWO) TIMES A DAY RINSE MOUTH AFTER USE  (Patient not taking: Reported on 4/10/2022 ), Disp: 12 g, Rfl: 1    fluticasone (FLONASE) 50 mcg/act nasal spray, SPRAY 1 SPRAY INTO EACH NOSTRIL EVERY DAY, Disp: 16 mL, Rfl: 1    furosemide (LASIX) 40 mg tablet, TAKE 1 TABLET BY MOUTH EVERY DAY, Disp: 90 tablet, Rfl: 3    gabapentin (NEURONTIN) 800 mg tablet, Take 1 tablet (800 mg total) by mouth 4 (four) times a day, Disp: 360 tablet, Rfl: 1    loratadine (CLARITIN) 10 mg tablet, Take 10 mg by mouth daily, Disp: , Rfl:     magnesium 30 MG tablet, Take 30 mg by mouth 2 (two) times a day, Disp: , Rfl:     metoprolol tartrate (LOPRESSOR) 50 mg tablet, Take 1 5 tablets (75 mg total) by mouth 2 (two) times a day, Disp: 180 tablet, Rfl: 3    multivitamin (THERAGRAN) TABS, Take 1 tablet by mouth daily, Disp: , Rfl:     oxyCODONE (Roxicodone) 5 immediate release tablet, Take 1/2 to 1 tablets by mouth every 4-6 hours as needed for pain, Disp: 30 tablet, Rfl: 0    pantoprazole (PROTONIX) 20 mg tablet, Take 1 tablet (20 mg total) by mouth daily, Disp: 20 tablet, Rfl: 0    polyethylene glycol (MIRALAX) 17 g packet, Take 17 g by mouth daily for 5 days, Disp: 85 g, Rfl: 0    pramipexole (MIRAPEX) 0 5 mg tablet, 2 FULL TABLETS TWICE A DAY AT 5:00 P  M  AND 10:00 P M , Disp: 360 tablet, Rfl: 1    warfarin (COUMADIN) 7 5 mg tablet, TAKE 1 TABLET BY MOUTH EVERY DAY (Patient taking differently: Last dose 3/22/22 ), Disp: 90 tablet, Rfl: 3    Allergies   Allergen Reactions    Latex      Added based on information entered during case entry, please review and add reactions, type, and severity as needed    Cephalexin Rash    Duloxetine Hcl Other (See Comments)     Facial pins and needles sensation    Erythromycin Rash    Levofloxacin Other (See Comments)     Muscular aches    Penicillins Rash    Savella [Milnacipran] Rash    Sulfa Antibiotics Rash       Objective: There were no vitals filed for this visit  There is no height or weight on file to calculate BMI  Right Knee Exam     Muscle Strength   The patient has normal right knee strength  Tenderness   The patient is experiencing no tenderness  Range of Motion   Extension: 5   Flexion: 100     Tests   Varus: negative Valgus: negative    Other   Erythema: absent  Scars: present (Well healed anterior knee incision)  Sensation: normal  Pulse: present  Swelling: mild  Effusion: no effusion present    Comments: There is mild tenderness globally throughout right lower extremity below the knee with soft tissue edema and erythema  There is a 2 x 2 cm area of scabbing at interior aspect of anterior knee incision without fluctuance or drainage  No wound dehiscence  Observations     Right Knee   Negative for effusion  Physical Exam  Vitals reviewed  Constitutional:       Appearance: Normal appearance  HENT:      Head: Normocephalic and atraumatic  Eyes:      General: No scleral icterus  Conjunctiva/sclera: Conjunctivae normal    Cardiovascular:      Rate and Rhythm: Normal rate and regular rhythm  Pulses: Normal pulses  Pulmonary:      Effort: Pulmonary effort is normal  No respiratory distress  Breath sounds: No stridor  Musculoskeletal:         General: Normal range of motion  Right knee: No effusion  Skin:     General: Skin is warm and dry  Capillary Refill: Capillary refill takes less than 2 seconds  Coloration: Skin is not jaundiced  Findings: Erythema (Right lower extremity below knee) present  No bruising  Neurological:      General: No focal deficit present  Mental Status: She is alert  Psychiatric:         Mood and Affect: Mood normal          Behavior: Behavior normal          I have personally reviewed pertinent films in PACS  No new imaging at this visit

## 2022-04-28 ENCOUNTER — APPOINTMENT (OUTPATIENT)
Dept: LAB | Facility: CLINIC | Age: 82
End: 2022-04-28
Payer: MEDICARE

## 2022-04-28 ENCOUNTER — OFFICE VISIT (OUTPATIENT)
Dept: PHYSICAL THERAPY | Facility: CLINIC | Age: 82
End: 2022-04-28
Payer: MEDICARE

## 2022-04-28 ENCOUNTER — ANTICOAG VISIT (OUTPATIENT)
Dept: CARDIOLOGY CLINIC | Facility: CLINIC | Age: 82
End: 2022-04-28

## 2022-04-28 DIAGNOSIS — M17.11 PRIMARY OSTEOARTHRITIS OF RIGHT KNEE: ICD-10-CM

## 2022-04-28 DIAGNOSIS — M17.11 PRIMARY OSTEOARTHRITIS OF RIGHT KNEE: Primary | ICD-10-CM

## 2022-04-28 DIAGNOSIS — G89.29 CHRONIC PAIN OF RIGHT KNEE: ICD-10-CM

## 2022-04-28 DIAGNOSIS — I48.91 ATRIAL FIBRILLATION, UNSPECIFIED TYPE (HCC): Primary | ICD-10-CM

## 2022-04-28 DIAGNOSIS — M25.561 CHRONIC PAIN OF RIGHT KNEE: ICD-10-CM

## 2022-04-28 LAB
EST. AVERAGE GLUCOSE BLD GHB EST-MCNC: 105 MG/DL
HBA1C MFR BLD: 5.3 %

## 2022-04-28 PROCEDURE — 83036 HEMOGLOBIN GLYCOSYLATED A1C: CPT

## 2022-04-28 PROCEDURE — 97112 NEUROMUSCULAR REEDUCATION: CPT

## 2022-04-28 PROCEDURE — 97110 THERAPEUTIC EXERCISES: CPT

## 2022-04-28 NOTE — PROGRESS NOTES
Daily Note     Today's date: 2022  Patient name: Albert German  : 1940  MRN: [id number]  Referring provider: James Vee DO  Dx:   Encounter Diagnosis     ICD-10-CM    1  Primary osteoarthritis of right knee  M17 11    2  Chronic pain of right knee  M25 561     G89 29                   Subjective: Patient reports no pain in knee today but reports dull discomfort in back of leg that is keeping her from sitting in recliner comfortably and she had to just go to bed last night  Patient stated MD told her to not have any STM done until cellulitis is completely gone  Objective: See treatment diary below      Assessment: Tolerated treatment well  Patient demonstrated fatigue post treatment      Plan: Continue per plan of care        Eval/ Re-eval Auth #/ Referral # Total visits Start date  Expiration date Total active units  Total manual units  PT only or  PT+OT?   3-21-22 (pre-op) NA NA NA NA NA NA NA   3-31-22 (post-op)                                              Precautions: B diabetic neuropathy, DM II, L TKA 2020    Past Medical History:   Diagnosis Date    Atrial fibrillation ([facility] [address] )     Carrero's esophagus     last assessed: 2018    BRCA1 negative     BRCA2 negative     Breast cancer ([facility] [address] ) 2006    stage 1 (left), given adjuvant radiation with Arimidex x 5 years    Cancer [facility])     Left Breast, Lumpectomy    Cataract     last assessed: 3/11/2014    Dysplasia of toenail     last assessed: 2017    Esophageal reflux     GERD (gastroesophageal reflux disease)     Gross hematuria     last assessed: 2015    Hematuria     Hiatal hernia     History of radiation therapy     Hypertension     Irregular heart beat     AFIB    Mixed sensory-motor polyneuropathy     Neuropathy     Obesity     Pacemaker     Paroxysmal atrial fibrillation (HCC)     Peripheral neuropathy     Rectal bleeding     Restless leg syndrome     Shortness of breath     last assessed: 1/11/2016         Date 3/21/2022  3/31/22 4/5/22 4/18/22 4/21/22 4/28/22   Visit Number IE 2 (post-op) 3 4 5 6   AROM         Knee Flexion Tested  88 72 86 98 95   Knee Extension tested -3 -3 0 0 0            Manual         Patellar mobs         STM                           TherEx         Knee/Hip PROM x2-3 mins  5' 10' x10 min as tolerated  x8 mins total same X 6 min   Active w/u         Quad set, glute set x10-15  rev rev x10  x20 total  X 20 total   Ankle pumps rev rev rev x10     SLR x20   x15-20 SLR AA AA x15 total    SAQ x30 SAQ 2*10, 1 x 10 AA   Hip abduction         Knee ext: SAQ, LAQ         Heel slides  rev rev   Calf stretch in standing        Mini squat at bar 3x10 Same x 10 total         HS and glute/piri stretching x6-7 mins  Lateral walking 6x 15ft        Bridging x 20 total                      NuStep         TherAct      10 min Lvl 1, seat 8   Patient education x5-6 mins for POC and HEP rev  HEP, PT POC, pt edu, skin check, 20' Skin check, pt edu, HEP, PT POC x30' Rev x2-3 mins Rev x5 mins     Stair negotiation    4" up/down x 10 sets  4" up/down 2 x 10 sets   Car transfer              exag march at bar x2-3 laps, side step x 2-3 laps           4" step stretch 10x 2 5sec hold   Gait Training             ' total, side step at bar x 3-4 mins                       Modalities         CP  10' post  8' post Home

## 2022-04-29 ENCOUNTER — HOSPITAL ENCOUNTER (OUTPATIENT)
Dept: RADIOLOGY | Facility: HOSPITAL | Age: 82
Discharge: HOME | End: 2022-04-29
Payer: MEDICARE

## 2022-04-29 ENCOUNTER — TELEPHONE (OUTPATIENT)
Dept: OBGYN CLINIC | Facility: CLINIC | Age: 82
End: 2022-04-29

## 2022-04-29 ENCOUNTER — PATIENT OUTREACH (OUTPATIENT)
Dept: INTERNAL MEDICINE CLINIC | Facility: CLINIC | Age: 82
End: 2022-04-29

## 2022-04-29 DIAGNOSIS — Z96.651 STATUS POST TOTAL RIGHT KNEE REPLACEMENT USING CEMENT: ICD-10-CM

## 2022-04-29 DIAGNOSIS — M79.604 RIGHT LEG PAIN: Primary | ICD-10-CM

## 2022-04-29 DIAGNOSIS — M79.604 RIGHT LEG PAIN: ICD-10-CM

## 2022-04-29 DIAGNOSIS — R60.0 EDEMA OF RIGHT LOWER LEG: ICD-10-CM

## 2022-04-29 PROCEDURE — 93971 EXTREMITY STUDY: CPT | Performed by: SURGERY

## 2022-04-29 PROCEDURE — 93971 EXTREMITY STUDY: CPT

## 2022-04-29 NOTE — TELEPHONE ENCOUNTER
Able to scheduled STAT study today, patient notified via mobile phone, she understood she is to go to the Outpatient testing at [facility] and they will be waiting for her

## 2022-04-29 NOTE — PROGRESS NOTES
Called the patient  She contacted ortho this AM for increased pain in her leg this morning  She states she took oxycodone & fell back asleep  She woke up later & the pain was still there  Patient took Tylenol around 11:30/12 & reports current pain is 5/10  She is elevating throughout the day, but continues to have tenderness to the back of her leg when it rests on her recliner  She has a negative Get's sign  She denies swelling & heat of the calf  Discoloration is improved  She reports mild edema of her right ankle  She understands this is not unusual post-op  She will continue to elevate & ice with intermittent walking to alleviate tightness/discomfort  All questions answered & patient encouraged to continue her post-op regimen for continued healing & recovery  Will continue to follow

## 2022-04-29 NOTE — TELEPHONE ENCOUNTER
Patient contacted office and lvm on clinical line  Patient called to report at 4 am pain in her leg woke her up, with still a lot of pain in her leg this morning  Sitting is even worse, when her calf touches/rests on a surface  She reports some warmth  She will do the pain regiment but unsure if she should do anymore?

## 2022-04-29 NOTE — TELEPHONE ENCOUNTER
Please send her for another ultrasound of her right lower extremity  We may have gotten a blood clot since her last US during her admission    Thank you

## 2022-05-02 ENCOUNTER — OFFICE VISIT (OUTPATIENT)
Dept: PHYSICAL THERAPY | Facility: CLINIC | Age: 82
End: 2022-05-02
Payer: MEDICARE

## 2022-05-02 DIAGNOSIS — M17.11 PRIMARY OSTEOARTHRITIS OF RIGHT KNEE: Primary | ICD-10-CM

## 2022-05-02 DIAGNOSIS — G89.29 CHRONIC PAIN OF RIGHT KNEE: ICD-10-CM

## 2022-05-02 DIAGNOSIS — M25.561 CHRONIC PAIN OF RIGHT KNEE: ICD-10-CM

## 2022-05-02 PROCEDURE — 97112 NEUROMUSCULAR REEDUCATION: CPT

## 2022-05-02 PROCEDURE — 97110 THERAPEUTIC EXERCISES: CPT

## 2022-05-02 NOTE — PROGRESS NOTES
Daily Note     Today's date: 2022  Patient name: Harish Jane  : 1940  MRN: [id number]  Referring provider: Denis Camacho DO  Dx:   Encounter Diagnosis     ICD-10-CM    1  Primary osteoarthritis of right knee  M17 11    2  Chronic pain of right knee  M25 561     G89 29                   Subjective: Pt reports that she is having a hard time breathing w/ mask on today, leg feels better  Gets sharp pain occasionally but feels she is improving  Objective: requires cueing for forward WS and glute drive during stair work, mod correction but at least min/mod UE support required throughout  Assessment: Tolerated treatment well  Patient demonstrated fatigue post treatment and would benefit from continued PT  Pt reports fatigue but no increase in pain by end  She shows good tolerance to standing exercises and will continue to be pushed as sx allow  She will benefit from strengthening in WB positions to include stairs and squats  PROM normalizing         Plan: Continue per plan of care  hip abd, leg press, squats, sit<>stand from lower surface      Eval/ Re-eval Auth #/ Referral # Total visits Start date  Expiration date Total active units  Total manual units  PT only or  PT+OT?   3-21-22 (pre-op) NA NA NA NA NA NA NA   3-31-22 (post-op)                                              Precautions: B diabetic neuropathy, DM II, L TKA 2020    Past Medical History:   Diagnosis Date    [facility] fibrillation ([facility] )     Carrero's esophagus     last assessed: 2018    BRCA1 negative     BRCA2 negative     [facility]     stage 1 (left), given adjuvant radiation with Arimidex x 5 years    Cancer [facility])     Left Breast, Lumpectomy    Cataract     last assessed: 3/11/2014    Dysplasia of toenail     last assessed: 2017    Esophageal reflux     GERD (gastroesophageal reflux disease)     Gross hematuria     last assessed: 2015    Hematuria     Hiatal hernia     History of radiation therapy     Hypertension     Irregular heart beat     AFIB    Mixed sensory-motor polyneuropathy     Neuropathy     Obesity     Pacemaker     Paroxysmal atrial fibrillation (HCC)     Peripheral neuropathy     Rectal bleeding     Restless leg syndrome     Shortness of breath     last assessed: 1/11/2016         Date 5/2/22 4/5/22 4/18/22 4/21/22 4/28/22   Visit Number 7  3 4 5 6   AROM         Knee Flexion 98 88 72 86 98 95   Knee Extension 0 -3 -3 0 0 0            Manual         Patellar mobs         STM                           TherEx         Knee/Hip PROM x5-6 mins  5' 10' x10 min as tolerated  x8 mins total same X 6 min   Active w/u         Quad set, glute set x20 rev rev x10  x20 total  X 20 total   Ankle pumps  rev rev x10     SLR 2x10-15    x15-20 SLR AA AA x15 total    SAQ x30 SAQ 2*10, 1 x 10 AA   Hip abduction         Knee ext: SAQ, LAQ         Heel slides Heel raises x 20-30 total rev rev   Calf stretch in standing     Squat at bar x 20 total   Mini squat at bar 3x10 Same x 10 total     R side knee PROM w/ HS stretching x 6-7 mins     HS and glute/piri stretching x6-7 mins  Lateral walking 6x 15ft    3x10 w/ ad sq bridging     Bridging x 20 total                      NuStep         TherAct      10 min Lvl 1, seat 8   Patient education x2-3 mins for POC and HEP rev  HEP, PT POC, pt edu, skin check, 20' Skin check, pt edu, HEP, PT POC x30' Rev x2-3 mins Rev x5 mins     Stair negotiation    4" up/down x 10 sets  4" up/down 2 x 10 sets   Car transfer          exag gait at bar x 2-3 mins     exag march at bar x2-3 laps, side step x 2-3 laps      6" ant step up x20, 4" ant tap down x 10, 4" lat step ups x20      4" step stretch 10x 2 5sec hold   Gait Training             ' total, side step at bar x 3-4 mins                       Modalities         CP Home 10' post  8' post Home

## 2022-05-05 ENCOUNTER — OFFICE VISIT (OUTPATIENT)
Dept: PHYSICAL THERAPY | Facility: CLINIC | Age: 82
End: 2022-05-05
Payer: MEDICARE

## 2022-05-05 ENCOUNTER — PATIENT OUTREACH (OUTPATIENT)
Dept: INTERNAL MEDICINE CLINIC | Facility: CLINIC | Age: 82
End: 2022-05-05

## 2022-05-05 DIAGNOSIS — G89.29 CHRONIC PAIN OF RIGHT KNEE: ICD-10-CM

## 2022-05-05 DIAGNOSIS — M17.11 PRIMARY OSTEOARTHRITIS OF RIGHT KNEE: Primary | ICD-10-CM

## 2022-05-05 DIAGNOSIS — M25.561 CHRONIC PAIN OF RIGHT KNEE: ICD-10-CM

## 2022-05-05 PROCEDURE — 97140 MANUAL THERAPY 1/> REGIONS: CPT

## 2022-05-05 PROCEDURE — 97110 THERAPEUTIC EXERCISES: CPT

## 2022-05-05 NOTE — PROGRESS NOTES
Daily Note     Today's date: 2022  Patient name: Debby Mckeon  : 1940  MRN: [id number]  Referring provider: Lindsay Chaves DO  Dx:   Encounter Diagnosis     ICD-10-CM    1  Primary osteoarthritis of right knee  M17 11    2  Chronic pain of right knee  M25 561     G89 29                   Subjective: Patient reports she is doing well today offers no new complaints at this time  Objective: See treatment diary below      Assessment: Tolerated treatment well  Patient fatigues quickly  Plan: Continue per plan of care        Eval/ Re-eval Auth #/ Referral # Total visits Start date  Expiration date Total active units  Total manual units  PT only or  PT+OT?   3-21-22 (pre-op) NA NA NA NA NA NA NA   3-31-22 (post-op)                                              Precautions: B diabetic neuropathy, DM II, L TKA 2020    Past Medical History:   Diagnosis Date    Atrial fibrillation ([facility] )     Carrero's esophagus     last assessed: 2018    BRCA1 negative     BRCA2 negative     Breast cancer ([facility] ) 2006    stage 1 (left), given adjuvant radiation with Arimidex x 5 years    Cancer [facility]) 2006    Left Breast, Lumpectomy    Cataract     last assessed: 3/11/2014    Dysplasia of toenail     last assessed: 2017    Esophageal reflux     GERD (gastroesophageal reflux disease)     Gross hematuria     last assessed: 2015    Hematuria     Hiatal hernia     History of radiation therapy     Hypertension     Irregular heart beat     AFIB    Mixed sensory-motor polyneuropathy     Neuropathy     Obesity     Pacemaker     Paroxysmal atrial fibrillation (HCC)     Peripheral neuropathy     Rectal bleeding     Restless leg syndrome     Shortness of breath     last assessed: 2016         Date 22   Visit Number 7 8  4 5 6   AROM         Knee Flexion 98   86 98 95   Knee Extension 0   0 0 0            Manual         Patellar mobs STM                           TherEx         Knee/Hip PROM x5-6 mins  performed   x10 min as tolerated  x8 mins total same X 6 min   Active w/u         Quad set, glute set x20 5"x20  x10  x20 total  X 20 total   Ankle pumps    x10     SLR 2x10-15  2x10-15 AA  x15-20 SLR AA AA x15 total    SAQ x30 SAQ 2*10, 1 x 10 AA   Hip abduction         Knee ext: SAQ, LAQ         Heel slides Heel raises x 20-30 total HR x30    Calf stretch in standing     Squat at bar x 20 total Squats x20  Mini squat at bar 3x10 Same x 10 total     R side knee PROM w/ HS stretching x 6-7 mins  R side knee PROM w/ HS stretching x 6-7 mins   HS and glute/piri stretching x6-7 mins  Lateral walking 6x 15ft    3x10 w/ ad sq bridging  3x10 w/ ad sq bridging   Bridging x 20 total                      NuStep         TherAct      10 min Lvl 1, seat 8   Patient education x2-3 mins for POC and HEP rev    Rev x2-3 mins Rev x5 mins     Stair negotiation    4" up/down x 10 sets  4" up/down 2 x 10 sets   Car transfer          exag gait at bar x 2-3 mins     exag march at bar x2-3 laps, side step x 2-3 laps      6" ant step up x20, 4" ant tap down x 10, 4" lat step ups x20  6" ant step up x20, 4" ant tap down x 10, 4" lat step ups x20    4" step stretch 10x 2 5sec hold   Gait Training             ' total, side step at bar x 3-4 mins                       Modalities         CP Home   8' post Home

## 2022-05-09 ENCOUNTER — OFFICE VISIT (OUTPATIENT)
Dept: PHYSICAL THERAPY | Facility: CLINIC | Age: 82
End: 2022-05-09
Payer: MEDICARE

## 2022-05-09 DIAGNOSIS — G89.29 CHRONIC PAIN OF RIGHT KNEE: ICD-10-CM

## 2022-05-09 DIAGNOSIS — M17.11 PRIMARY OSTEOARTHRITIS OF RIGHT KNEE: Primary | ICD-10-CM

## 2022-05-09 DIAGNOSIS — M25.561 CHRONIC PAIN OF RIGHT KNEE: ICD-10-CM

## 2022-05-09 PROCEDURE — 97110 THERAPEUTIC EXERCISES: CPT

## 2022-05-09 PROCEDURE — 97112 NEUROMUSCULAR REEDUCATION: CPT

## 2022-05-09 NOTE — PROGRESS NOTES
Daily Note         Today's date: 2022  Patient name: Brayden Hernandez  : 1940  MRN: [id number]  Referring provider: Sander Cisneros DO  Dx:   Encounter Diagnosis     ICD-10-CM    1  Primary osteoarthritis of right knee  M17 11    2  Chronic pain of right knee  M25 561     G89 29        Subjective: Ana Ortiz reports pain level 4/10  Objective: See treatment diary below    Assessment: patient new to PT    patient fatigued quickly with ther ex and needed a mask break frequently throughout the session  incisoin helaing well with + scabbing at distal aspect of incision   no redness , no drainage, no sign of infection  Plan: continue as indicated by primary PT            Eval/ Re-eval Auth #/ Referral # Total visits Start date  Expiration date Total active units  Total manual units  PT only or  PT+OT?   3-21-22 (pre-op) NA NA NA NA NA NA NA   3-31-22 (post-op)                                              Precautions: B diabetic neuropathy, DM II, L TKA 2020    Past Medical History:   Diagnosis Date    [facility]rial [facility] ([facility] )     Carrero's esophagus     last assessed: 2018    BRCA1 negative     BRCA2 negative     Breast cancer ([facility] ) 2006    stage 1 (left), given adjuvant radiation with Arimidex x 5 years    Cancer [facility]) 2006    Left Breast, Lumpectomy    Cataract     last assessed: 3/11/2014    Dysplasia of toenail     last assessed: 2017    Esophageal reflux     GERD (gastroesophageal reflux disease)     Gross hematuria     last assessed: 2015    Hematuria     Hiatal hernia     History of radiation therapy     Hypertension     Irregular heart beat     AFIB    Mixed sensory-motor polyneuropathy     Neuropathy     Obesity     Pacemaker     Paroxysmal atrial fibrillation (HCC)     Peripheral neuropathy     Rectal bleeding     Restless leg syndrome     Shortness of breath     last assessed: 2016         Date 22 4/18/22 4/21/22 4/28/22   Visit Number [phone] 4 5 6   AROM         Knee Flexion 98  97 86 98 95   Knee Extension 0  0 0 0 0            Manual         Patellar mobs         STM                           TherEx         Knee/Hip PROM x5-6 mins  performed   x10 min as tolerated  x8 mins total same X 6 min   Active w/u         Quad set, glute set x20 5"x20 5 sec x 20 each  x10  x20 total  X 20 total   Ankle pumps    x10     SLR 2x10-15  2x10-15 AA 10 x 2 AAROM  x15-20 SLR AA AA x15 total    SAQ x30 SAQ 2*10, 1 x 10 AA   Hip abduction         Knee ext: SAQ, LAQ   LAQ: 5 sec x 10        Heel slides Heel raises x 20-30 total HR x30 --   Calf stretch in standing     Squat at bar x 20 total Squats x20 20x at bar  Mini squat at bar 3x10 Same x 10 total     R side knee PROM w/ HS stretching x 6-7 mins  R side knee PROM w/ HS stretching x 6-7 mins R side knee PROM w/ HS stretching x 6-7 mins  HS and glute/piri stretching x6-7 mins  Lateral walking 6x 15ft    3x10 w/ ad sq bridging  3x10 w/ ad sq bridging 3x10 w/ ad sq bridging  Bridging x 20 total                      NuStep         TherAct      10 min Lvl 1, seat 8   Patient education x2-3 mins for POC and HEP rev    Rev x2-3 mins Rev x5 mins     Stair negotiation    4" up/down x 10 sets  4" up/down 2 x 10 sets   Car transfer   Standing knee flexion AROM 2 x 10         exag gait at bar x 2-3 mins     exag march at bar x2-3 laps, side step x 2-3 laps      6" ant step up x20, 4" ant tap down x 10, 4" lat step ups x20  6" ant step up x20, 4" ant tap down x 10, 4" lat step ups x20 6" ant step up x20, 4" ant tap down x 20, 4" lat step ups x20   4" step stretch 10x 2 5sec hold   Gait Training            side step at bar x 3-4 mins ' total, side step at bar x 3-4 mins                       Modalities         CP Home  5 min  8' post Home

## 2022-05-12 ENCOUNTER — OFFICE VISIT (OUTPATIENT)
Dept: OBGYN CLINIC | Facility: CLINIC | Age: 82
End: 2022-05-12

## 2022-05-12 ENCOUNTER — APPOINTMENT (OUTPATIENT)
Dept: LAB | Facility: CLINIC | Age: 82
End: 2022-05-12
Payer: MEDICARE

## 2022-05-12 ENCOUNTER — OFFICE VISIT (OUTPATIENT)
Dept: PHYSICAL THERAPY | Facility: CLINIC | Age: 82
End: 2022-05-12
Payer: MEDICARE

## 2022-05-12 VITALS
DIASTOLIC BLOOD PRESSURE: 78 MMHG | WEIGHT: 230 LBS | SYSTOLIC BLOOD PRESSURE: 118 MMHG | HEART RATE: 92 BPM | HEIGHT: 65 IN | BODY MASS INDEX: 38.32 KG/M2

## 2022-05-12 DIAGNOSIS — M17.11 PRIMARY OSTEOARTHRITIS OF RIGHT KNEE: Primary | ICD-10-CM

## 2022-05-12 DIAGNOSIS — Z96.651 STATUS POST TOTAL RIGHT KNEE REPLACEMENT USING CEMENT: ICD-10-CM

## 2022-05-12 DIAGNOSIS — G89.29 CHRONIC PAIN OF RIGHT KNEE: ICD-10-CM

## 2022-05-12 DIAGNOSIS — T81.89XD DELAYED SURGICAL WOUND HEALING, SUBSEQUENT ENCOUNTER: Primary | ICD-10-CM

## 2022-05-12 DIAGNOSIS — M79.604 RIGHT LEG PAIN: ICD-10-CM

## 2022-05-12 DIAGNOSIS — M25.561 CHRONIC PAIN OF RIGHT KNEE: ICD-10-CM

## 2022-05-12 PROCEDURE — 97110 THERAPEUTIC EXERCISES: CPT

## 2022-05-12 PROCEDURE — 99024 POSTOP FOLLOW-UP VISIT: CPT | Performed by: ORTHOPAEDIC SURGERY

## 2022-05-12 NOTE — PROGRESS NOTES
Assessment/Plan:  1  Delayed surgical wound healing, subsequent encounter     2  Status post total right knee replacement using cement     3  Right leg pain       81 yo F 6 weeks from right total knee arthroplasty with mild partial thickness necrosis at distal aspect of her surgical incision  She has no evidence of wound infection at this time  There is mature fibrous tissue in the base of the wound and continues to progress in healingContinue to favor hyperemia related to vascular condition of her legs, given no warmth to leg or other signs of infection  She should continue with compressive stockinette to right leg as much as possible, elevate right leg as much as possible to help with swelling  Continue activity to tolerance and physical therapy  Continue her warfarin as prescribed  Return in 3 weeks for repeat wound check  Subjective:     Patient ID: Heena Souza is a 80 y o  female  6 weeks from right total knee arthroplasty  She does endorse right lower leg pain and swelling mostly later in the day after she is up and walking around for a while  Swelling and redness improved with elevation and stockinette use since she was last seen  She has no fever chills nausea vomiting  She states that the redness and swelling in her right lower leg is slowly improving  She denies wound problems or drainage  Review of Systems   Constitutional: Negative for chills and fever  HENT: Negative for ear pain, sinus pain and sore throat  Eyes: Negative for pain and visual disturbance  Respiratory: Negative for cough and shortness of breath  Cardiovascular: Negative for chest pain  Gastrointestinal: Negative for abdominal pain, nausea and vomiting  Endocrine: Negative for cold intolerance and heat intolerance  Genitourinary: Negative for difficulty urinating and dysuria  Musculoskeletal: Positive for myalgias (Right anterior and posterior lower leg)     Skin: Positive for color change and wound (Scab anterior distal incision right knee)  Negative for rash  Allergic/Immunologic: Negative for environmental allergies and food allergies  Neurological: Negative for weakness, numbness and headaches  Hematological: Does not bruise/bleed easily  Past Medical History:   Diagnosis Date    Arthritis     At[facility] fibrillation ([facility] )     Carrero's esophagus     last assessed: 1/23/2018    BRCA1 negative     BRCA2 negative     Breast cancer ([facility] ) 2006    stage 1 (left), given adjuvant radiation with Arimidex x 5 years    Cancer [facility]) 2006    Left Breast, Lumpectomy    Cataract     last assessed: 3/11/2014    Dysplasia of toenail     last assessed: 8/29/2017    Esophageal reflux     GERD (gastroesophageal reflux disease)     Gross hematuria     last assessed: 2/19/2015    Hematuria     Hiatal hernia     History of radiation therapy     Hypertension     Irregular heart beat     AFIB    Mixed sensory-motor polyneuropathy     Neuropathy     Obesity     Pacemaker     Paroxysmal atrial fibrillation (HCC)     Peripheral neuropathy     Rectal bleeding     Restless leg syndrome     Shortness of breath     last assessed: 1/11/2016       Past Surgical History:   Procedure Laterality Date    BREAST BIOPSY Left 2006    BREAST LUMPECTOMY Left 2006    onset: 2006    BREAST SURGERY      CARDIAC PACEMAKER PLACEMENT      x 3 2006    CATARACT EXTRACTION      COLONOSCOPY      CYSTOSCOPY  04/04/2014    diagnostic    HYSTERECTOMY      ALISON BSO; due to fibroid uterus; age 38   [facility] KNEE CARTILAGE SURGERY      excision lesion of meniscus or capsule knee    KNEE SURGERY      OOPHORECTOMY Bilateral     OTHER SURGICAL HISTORY      radiation therapy    MN COLONOSCOPY FLX DX W/COLLJ SPEC WHEN PFRMD N/A 2/8/2017    Procedure: COLONOSCOPY;  Surgeon: Shelby Carrasquillo MD;  [facility]: [facility];   Service: Gastroenterology    MN ESOPHAGOGASTRODUODENOSCOPY TRANSORAL DIAGNOSTIC N/A 9/20/2017    Procedure: ESOPHAGOGASTRODUODENOSCOPY (EGD); Surgeon: Juan Manuel Perez MD;  Location:  GI LAB;   Service: Gastroenterology    SC TOTAL KNEE ARTHROPLASTY Left 2020    Procedure: ARTHROPLASTY KNEE TOTAL;  Surgeon: Winsome Lewis DO;  Location: [address] [place] [address];  Service: Orthopedics    SC TOTAL KNEE ARTHROPLASTY Right 3/28/2022    Procedure: ARTHROPLASTY KNEE TOTAL W ROBOT - RIGHT;  Surgeon: Winsome Lewis DO;  Location: [address] [place] [address];  Service: Orthopedics    UPPER GASTROINTESTINAL ENDOSCOPY         Family History   Problem Relation Age of Onset    Hypertension Mother     Heart disease Father     Aneurysm Father     Coronary artery disease Father         in his 76s with aneurysm    Aortic aneurysm Father         abdominal    Scleroderma Sister     Breast cancer Sister 76    Hypertension Sister     Cancer Sister     No Known Problems Son     No Known Problems Son     Testicular cancer Son 39    Thyroid cancer Son 45    Colon cancer Maternal Aunt     Colon cancer Maternal Aunt     Breast cancer Other 48        kaylee's daughter    Alcohol abuse Neg Hx     Substance Abuse Neg Hx     Mental illness Neg Hx     Depression Neg Hx        Social History     Occupational History    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      Comment: retired   Tobacco Use    Smoking status: Former Smoker     Packs/day: 1 00     Years: 25 00     Pack years: 25 00     Types: Cigarettes     Quit date: 1980     Years since quittin 6    Smokeless tobacco: Never Used    Tobacco comment: Quit over 30 years ago; quit age 41   Vaping Use    Vaping Use: Never used   Substance and Sexual Activity    Alcohol use: Not Currently    Drug use: No    Sexual activity: Not on file         Current Outpatient Medications:     acetaminophen (TYLENOL) 325 mg tablet, Take 3 tablets (975 mg total) by mouth every 8 (eight) hours, Disp: , Rfl: 0    Calcium Acetate, Phos Binder, (CALCIUM ACETATE PO), Take by mouth daily  , Disp: , Rfl:     clobetasol (TEMOVATE) 0 05 % ointment, Apply once weekly to the affected area, Disp: 30 g, Rfl: 3    docusate sodium (COLACE) 100 mg capsule, Take 1 capsule (100 mg total) by mouth 2 (two) times a day, Disp: 60 capsule, Rfl: 0    famotidine (PEPCID) 40 MG tablet, TAKE 1 TABLET BY MOUTH EVERY DAY (Patient taking differently: daily at bedtime as needed), Disp: 90 tablet, Rfl: 1    Flovent  MCG/ACT inhaler, INHALE 2 PUFFS 2 (TWO) TIMES A DAY RINSE MOUTH AFTER USE , Disp: 12 g, Rfl: 1    fluticasone (FLONASE) 50 mcg/act nasal spray, SPRAY 1 SPRAY INTO EACH NOSTRIL EVERY DAY, Disp: 16 mL, Rfl: 1    furosemide (LASIX) 40 mg tablet, TAKE 1 TABLET BY MOUTH EVERY DAY, Disp: 90 tablet, Rfl: 3    gabapentin (NEURONTIN) 800 mg tablet, Take 1 tablet (800 mg total) by mouth 4 (four) times a day, Disp: 360 tablet, Rfl: 1    loratadine (CLARITIN) 10 mg tablet, Take 10 mg by mouth daily, Disp: , Rfl:     magnesium 30 MG tablet, Take 30 mg by mouth 2 (two) times a day, Disp: , Rfl:     metoprolol tartrate (LOPRESSOR) 50 mg tablet, Take 1 5 tablets (75 mg total) by mouth 2 (two) times a day, Disp: 180 tablet, Rfl: 3    multivitamin (THERAGRAN) TABS, Take 1 tablet by mouth daily, Disp: , Rfl:     oxyCODONE (Roxicodone) 5 immediate release tablet, Take 1/2 to 1 tablets by mouth every 4-6 hours as needed for pain, Disp: 30 tablet, Rfl: 0    pantoprazole (PROTONIX) 20 mg tablet, Take 1 tablet (20 mg total) by mouth daily, Disp: 20 tablet, Rfl: 0    pramipexole (MIRAPEX) 0 5 mg tablet, 2 FULL TABLETS TWICE A DAY AT 5:00 P  M   AND 10:00 P M , Disp: 360 tablet, Rfl: 1    warfarin (COUMADIN) 7 5 mg tablet, TAKE 1 TABLET BY MOUTH EVERY DAY, Disp: 90 tablet, Rfl: 3    docusate sodium (COLACE) 100 mg capsule, Take 1 capsule (100 mg total) by mouth 2 (two) times a day as needed for constipation for up to 5 days, Disp: 10 capsule, Rfl: 0    polyethylene glycol (MIRALAX) 17 g packet, Take 17 g by mouth daily for 5 days, Disp: 85 g, Rfl: 0    Allergies   Allergen Reactions    Latex      Added based on information entered during case entry, please review and add reactions, type, and severity as needed    Cephalexin Rash    Duloxetine Hcl Other (See Comments)     Facial pins and needles sensation    Erythromycin Rash    Levofloxacin Other (See Comments)     Muscular aches    Penicillins Rash    Savella [Milnacipran] Rash    Sulfa Antibiotics Rash       Objective:  Vitals:    05/12/22 0948   BP: 118/78   Pulse: 92       Body mass index is 38 87 kg/m²  Right Knee Exam     Muscle Strength   The patient has normal right knee strength  Tenderness   Right knee tenderness location: mild tenderness distal incision, posterior lower leg, and anteromedial lower leg  Range of Motion   Extension: 0   Flexion: 110     Tests   Varus: negative Valgus: negative  Lachman:  Anterior - negative    Posterior - negative    Other   Erythema: present (Foot to distal thigh)  Scars: present (Anterior right knee incision, there is partial thickness area of dry necrosis of skin, the wound bed below is well granulated  No exposed deep structures )  Sensation: normal  Pulse: present  Swelling: moderate  Effusion: no effusion present    Comments:  Edema to right lower leg          Observations     Right Knee   Negative for effusion  Physical Exam  Vitals reviewed  Constitutional:       Appearance: Normal appearance  HENT:      Head: Normocephalic and atraumatic  Eyes:      General: No scleral icterus  Conjunctiva/sclera: Conjunctivae normal    Cardiovascular:      Rate and Rhythm: Normal rate and regular rhythm  Pulses: Normal pulses  Pulmonary:      Effort: Pulmonary effort is normal  No respiratory distress  Breath sounds: No stridor  Musculoskeletal:         General: Normal range of motion  Right knee: No effusion  Right lower leg: Edema present  Left lower leg: Edema present     Skin: General: Skin is warm and dry  Capillary Refill: Capillary refill takes less than 2 seconds  Coloration: Skin is not jaundiced  Findings: Erythema present  Neurological:      General: No focal deficit present  Mental Status: She is alert  Psychiatric:         Mood and Affect: Mood normal          Behavior: Behavior normal          I have personally reviewed pertinent films in PACS    No new imaging at today's visit

## 2022-05-12 NOTE — PROGRESS NOTES
Daily Note     Today's date: 2022  Patient name: Rick Jordan  : 1940  MRN: [id number]  Referring provider: Marissa Chaves DO  Dx:   Encounter Diagnosis     ICD-10-CM    1  Primary osteoarthritis of right knee  M17 11    2  Chronic pain of right knee  M25 561     G89 29                   Subjective: pain level enteirng today is 2/10  States she saw the dr and he was pleased with progress and incision is not infected  Objective: See treatment diary below      Assessment: Tolerated treatment well  Patient inspected incision, distal aspect of incision patient dmeonstarted open wound where there was a black scab last sesson  patient stated physician emoved scab to inspect wound, and felt area is healing well, jusy taking time  it was "dry" when he cleaned it out per patient     Per primary PT will focus on gentle ROM this session      Plan: reeval next session by primary PT     Eval/ Re-eval Auth #/ Referral # Total visits Start date  Expiration date Total active units  Total manual units  PT only or  PT+OT?   3-21-22 (pre-op) NA NA NA NA NA NA NA   3-31-22 (post-op)                                              Precautions: B diabetic neuropathy, DM II, L TKA 2020    Past Medical History:   Diagnosis Date    [facility]     Carrero's esophagus     last assessed: 2018    BRCA1 negative     BRCA2 negative     Breast cancer ([facility] ) 2006    stage 1 (left), given adjuvant radiation with Arimidex x 5 years    Cancer [facility])     Left Breast, Lumpectomy    Cataract     last assessed: 3/11/2014    Dysplasia of toenail     last assessed: 2017    Esophageal reflux     GERD (gastroesophageal reflux disease)     Gross hematuria     last assessed: 2015    Hematuria     Hiatal hernia     History of radiation therapy     Hypertension     Irregular heart beat     AFIB    Mixed sensory-motor polyneuropathy     Neuropathy     Obesity     Pacemaker     Paroxysmal atrial fibrillation (HCC)     Peripheral neuropathy     Rectal bleeding     Restless leg syndrome     Shortness of breath     last assessed: 1/11/2016         Date 5/2/22 5/5/22 5/9/22 5/12/22 4/28/22   Visit Number 7 8 9 10   6   AROM         Knee Flexion 98  97  98 95   Knee Extension 0  0  0 0            Manual         Patellar mobs         STM                           TherEx         Knee/Hip PROM x5-6 mins  performed    performed knee flexion and ext x8 mins total same X 6 min   Active w/u         Quad set, glute set x20 5"x20 5 sec x 20 each  5 sec x 10  Each  x20 total  X 20 total   Ankle pumps         SLR 2x10-15  2x10-15 AA 10 x 2 AAROM  AAROM 10 x  (pt deferred further reps) AA x15 total    SAQ x30 SAQ 2*10, 1 x 10 AA   Hip abduction         Knee ext: SAQ, LAQ   LAQ: 5 sec x 10   LAQ: 5 sec x 10       Heel slides Heel raises x 20-30 total HR x30 -- HR: 30x   Calf stretch in standing     Squat at bar x 20 total Squats x20 20x at bar  -- Same x 10 total     R side knee PROM w/ HS stretching x 6-7 mins  R side knee PROM w/ HS stretching x 6-7 mins R side knee PROM w/ HS stretching x 6-7 mins Performed  HS and glute/piri stretching x6-7 mins  Lateral walking 6x 15ft    3x10 w/ ad sq bridging  3x10 w/ ad sq bridging 3x10 w/ ad sq bridging -- Bridging x 20 total                      NuStep    lv 1 5 min      TherAct      10 min Lvl 1, seat 8   Patient education x2-3 mins for POC and HEP rev    Rev x2-3 mins Rev x5 mins     Stair negotiation      4" up/down 2 x 10 sets   Car transfer   Standing knee flexion AROM 2 x 10   2 x 10        exag gait at bar x 2-3 mins     exag march at bar x2-3 laps, side step x 2-3 laps      6" ant step up x20, 4" ant tap down x 10, 4" lat step ups x20  6" ant step up x20, 4" ant tap down x 10, 4" lat step ups x20 6" ant step up x20, 4" ant tap down x 20, 4" lat step ups x20 --  4" step stretch 10x 2 5sec hold   Gait Training            side step at bar x 3-4 mins Modalities         CP Home  5 min   Home

## 2022-05-13 ENCOUNTER — ANTICOAG VISIT (OUTPATIENT)
Dept: CARDIOLOGY CLINIC | Facility: CLINIC | Age: 82
End: 2022-05-13

## 2022-05-16 ENCOUNTER — OFFICE VISIT (OUTPATIENT)
Dept: PHYSICAL THERAPY | Facility: CLINIC | Age: 82
End: 2022-05-16
Payer: MEDICARE

## 2022-05-16 DIAGNOSIS — M25.561 CHRONIC PAIN OF RIGHT KNEE: ICD-10-CM

## 2022-05-16 DIAGNOSIS — M17.11 PRIMARY OSTEOARTHRITIS OF RIGHT KNEE: Primary | ICD-10-CM

## 2022-05-16 DIAGNOSIS — G89.29 CHRONIC PAIN OF RIGHT KNEE: ICD-10-CM

## 2022-05-16 PROCEDURE — 97530 THERAPEUTIC ACTIVITIES: CPT

## 2022-05-16 PROCEDURE — 97110 THERAPEUTIC EXERCISES: CPT

## 2022-05-16 NOTE — PROGRESS NOTES
Progress Note     Today's date: 2022  Patient name: Katie Sher  : 1940  MRN: [id number]  Referring provider: Gómez Cobb DO  Dx:   Encounter Diagnosis     ICD-10-CM    1  Primary osteoarthritis of right knee  M17 11    2  Chronic pain of right knee  M25 561     G89 29                   Subjective: Pt feels she is improving  Notes that motion and strength are better, feels that ambulatory capacity is improving  Notes that pain is well controlled, feels that PT is helping  Has no new complaints  Wants to continue w/ PT  Feels that redness is fading from distal LE  Wants to get stronger, shows interest in working out at family [employer]  Functional update below:         Goals  Short Term Goals (4 weeks): ALL PROGRESSED   - Patient will be independent in basic HEP 2-3 weeks  - Patient will demonstrate >100 degrees of knee ROM for reciprocal stair negotiation  - Patient will have 0/10 pain at rest  - Patient will demonstrate >1/3 improvement in MMT grade as applicable    Long Term Goals (8 weeks): ALL PROGRESSED   - Patient will be independent in a comprehensive home exercise program  - Patient FOTO score will improve to at least 10 points higher than post op score    - Patient will ambulation with AD PRN  - Patient will independently ambulate >1000 feet (community ambulation)  - Patient will self-report >75% improvement in function    *goals extended by 2 and 4 weeks, respectively     Subjective  Pain  - Current pain ratin/10  - At best pain ratin/10  - At worst pain ratin/10  - Location: R knee   - Aggravating factors: stairs, ambulation, functional transfers, prolonged WB     Social Support  - Steps to enter house: 2-3  - Stairs in house: 13  - Bedroom/bathroom set up: normal   - DME available: walker   - Lives in: 2 story home - can stay on first floor   - Lives with: spouse       Objective     LE MMT  R knee flex/ext grossly 4/5  L knee flex/ext grossly 4+/5    B hips grossly 4- to 4/5     Core no greater than 1+/5     LE ROM    - L Knee Flexion: 112 degrees (120 passive) R Knee Flexion: 112 degrees (118 passive)  - L Knee Extension: 0 degrees  R Knee Extension: 0 degrees    Sensation  - Light touch: intact     Skin Check  - (-) redness, warmth, drainage    Knee Comments  - Gait Assessment: through clinical distances w/ equal WB but min decreased step length on R LE, fatigues quickly, min forward trunk flexion  Requires cueing for upright posture throughout   - Stair Negotiation: 6" leading w/ R LE w/ B UE support and fair glute drive, responds well to cueing for proper WS   - Squat: at bar through 50% w/ increased reliance on UE support at lower depths  - SLS: unable past 4-5 sec w/o UE support B          Assessment: Tolerated treatment well  Patient demonstrated fatigue post treatment and would benefit from continued PT  Does well w/ exercise progressions today, fatigue throughout but shows improving tolerance to strength work  ROM work will continue but strength training will be point of emphasis moving forward  Does well w/ introduction of leg press today  Increase intensity as sx allow on 2x/week basis, pt in agreement w/ plan  Plan: Continue per plan of care   stairs, SLS, gait speed progressions, loaded exercises as able      Eval/ Re-eval Auth #/ Referral # Total visits Start date  Expiration date Total active units  Total manual units  PT only or  PT+OT?   3-21-22 (pre-op) NA NA NA NA NA NA NA   3-31-22 (post-op)                                              Precautions: B diabetic neuropathy, DM II, L TKA August 2020    Past Medical History:   Diagnosis Date    Atrial fibrillation [facility] [address] )     Carrero's esophagus     last assessed: 1/23/2018    BRCA1 negative     BRCA2 negative     Breast cancer ([facility] [address] ) 2006    stage 1 (left), given adjuvant radiation with Arimidex x 5 years    Cancer [facility]) 2006    Left Breast, Lumpectomy    Cataract     last assessed: 3/11/2014    Dysplasia of toenail     last assessed: 8/29/2017    Esophageal reflux     GERD (gastroesophageal reflux disease)     Gross hematuria     last assessed: 2/19/2015    Hematuria     Hiatal hernia     History of radiation therapy     Hypertension     Irregular heart beat     AFIB    Mixed sensory-motor polyneuropathy     Neuropathy     Obesity     Pacemaker     Paroxysmal atrial fibrillation (HCC)     Peripheral neuropathy     Rectal bleeding     Restless leg syndrome     Shortness of breath     last assessed: 1/11/2016         Date 5/2/22 5/5/22 5/9/22 5/12/22 5/16/22 (PN)    Visit Number [id number] [phone] 10  11    AROM         Knee Flexion 98  97  112 95   Knee Extension 0  0  0  0            Manual         Patellar mobs         STM                           TherEx         Knee/Hip PROM x5-6 mins  performed    performed knee flexion and ext x4 mins total same X 6 min   Active w/u         Quad set, glute set x20 5"x20 5 sec x 20 each  5 sec x 10  Each  x20 total  X 20 total   Ankle pumps         SLR 2x10-15  2x10-15 AA 10 x 2 AAROM  AAROM 10 x  (pt deferred further reps) Active x 20 total  SAQ 2*10, 1 x 10 AA   Hip abduction         Knee ext: SAQ, LAQ   LAQ: 5 sec x 10   LAQ: 5 sec x 10   x20    Heel slides Heel raises x 20-30 total HR x30 -- HR: 30x  rev Calf stretch in standing     Squat at bar x 20 total Squats x20 20x at bar  -- Squats at bar 2x10      R side knee PROM w/ HS stretching x 6-7 mins  R side knee PROM w/ HS stretching x 6-7 mins R side knee PROM w/ HS stretching x 6-7 mins Performed  HS and glute/piri stretching x5 mins  Lateral walking 6x 15ft    3x10 w/ ad sq bridging  3x10 w/ ad sq bridging 3x10 w/ ad sq bridging -- Bridging x 20 total                  Leg press 65-75# 4x10 total     NuStep    lv 1 5 min  Pre 10 min lvl 2-3     TherAct      10 min Lvl 1, seat 8   Patient education x2-3 mins for POC and HEP rev    Rev x2-3 mins Rev x5 mins     Stair negotiation      4" up/down 2 x 10 sets   Car transfer   Standing knee flexion AROM 2 x 10   2 x 10   Progress note measurements x 5 mins      exag gait at bar x 2-3 mins     exag gait x 3 laps at bar      6" ant step up x20, 4" ant tap down x 10, 4" lat step ups x20  6" ant step up x20, 4" ant tap down x 10, 4" lat step ups x20 6" ant step up x20, 4" ant tap down x 20, 4" lat step ups x20 -- 6" step up/down x 20 total 4" step stretch 10x 2 5sec hold   Gait Training            side step at bar x 3-4 mins                         Modalities         CP Home  5 min   10 min post

## 2022-05-17 ENCOUNTER — OFFICE VISIT (OUTPATIENT)
Dept: PODIATRY | Facility: CLINIC | Age: 82
End: 2022-05-17
Payer: MEDICARE

## 2022-05-17 VITALS
SYSTOLIC BLOOD PRESSURE: 118 MMHG | BODY MASS INDEX: 38.32 KG/M2 | WEIGHT: 230 LBS | RESPIRATION RATE: 17 BRPM | DIASTOLIC BLOOD PRESSURE: 78 MMHG | HEIGHT: 65 IN

## 2022-05-17 DIAGNOSIS — L84 CORNS: ICD-10-CM

## 2022-05-17 DIAGNOSIS — M79.671 PAIN IN BOTH FEET: ICD-10-CM

## 2022-05-17 DIAGNOSIS — I70.209 PERIPHERAL ARTERIOSCLEROSIS (HCC): ICD-10-CM

## 2022-05-17 DIAGNOSIS — B35.1 ONYCHOMYCOSIS: ICD-10-CM

## 2022-05-17 DIAGNOSIS — E11.51 TYPE 2 DIABETES MELLITUS WITH DIABETIC PERIPHERAL ANGIOPATHY WITHOUT GANGRENE, WITHOUT LONG-TERM CURRENT USE OF INSULIN (HCC): Primary | ICD-10-CM

## 2022-05-17 DIAGNOSIS — I87.2 DEEP VENOUS INSUFFICIENCY: ICD-10-CM

## 2022-05-17 DIAGNOSIS — M79.672 PAIN IN BOTH FEET: ICD-10-CM

## 2022-05-17 PROCEDURE — 11056 PARNG/CUTG B9 HYPRKR LES 2-4: CPT | Performed by: PODIATRIST

## 2022-05-17 PROCEDURE — 99212 OFFICE O/P EST SF 10 MIN: CPT | Performed by: PODIATRIST

## 2022-05-17 NOTE — PROGRESS NOTES
Assessment/Plan:  Painful calluses   Diabetic neuropathy   Peripheral vascular disease   Chronic edema   Chronic plantar fasciitis left foot  Deep venous insufficiency         Plan   Diabetic foot exam performed   All mycotic nails debrided   Plantar calluses debrided without pain or complication   Procedures performed without pain or complication  Patient has been referred to vascular surgery for workup of deep venous insufficiency        Subjective   Patient is diabetic   She has pain with walking   She has pain with shoe wear   No history of trauma  Patient suffers from chronic edema of legs  She has had many bouts of cellulitis      Diabetic polyneuropathy associated with type 2 diabetes mellitus (HCC)     Corns     Pain in both feet     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HCC)     Chronic edema   Rule out deep venous insufficiency       Discussion/Summary  The patient was counseled regarding instructions for management,-- prognosis,-- patient and family education,-- risks and benefits of treatment options,-- importance of compliance with treatment  Patient is able to Self-Care  Possible side effects of new medications were reviewed with the patient/guardian today  The treatment plan was reviewed with the patient/guardian  The patient/guardian understands and agrees with the treatment plan      Chief Complaint  Patient has complaint of pain in her toes with ambulation   No history of trauma     History of Present Illness  HPI: Patient is doing well with Cymbalta however she began have facial tingling  She discontinued medicine   However the medication was relieving her neuropathic pain       Review of Systems     ROS reviewed       Active Problems     1  Achilles tendinitis of left lower extremity (466 71) (M76 62)   2  Acquired ankle/foot deformity (466 70) (M21 969)   3  AF (paroxysmal atrial fibrillation) (427 31) (I48 0)   4  Anticoagulant long-term use (V58 61) (Z79 01)   5  Arthritis (275 90) (M19 90)   6  At risk for bone density loss (V49 89) (Z91 89)   7  Atrial fibrillation (427 31) (I48 91)   8  History of Breast Cancer (V10 3)   9  Difficulty walking (719 7) (R26 2)   10  Encounter for screening mammogram for breast cancer (V76 12) (Z12 31)   11  Esophageal reflux (530 81) (K21 9)   12  Foot pain, bilateral (729 5) (M79 671,M79 672)   13  Hiatal hernia (553 3) (K44 9)   14  History of Carrero's esophagus (V12 79) (Z87 19)   15  History of fall (V15 88) (Z91 81)   16  Hypertension (401 9) (I10)   17  Leg pain, left (729 5) (D71 059)   18  Lichen sclerosus et atrophicus (701 0) (L90 0)   19  Lumbar radiculopathy (724 4) (M54 16)   20  Multiple lung nodules (793 19) (R91 8)   21  Obesity (278 00) (E66 9)   22  Onychomycosis (110 1) (B35 1)   23  Osteopenia (733 90) (M85 80)   24  Pacemaker (V45 01) (Z95 0)   25  Peripheral neuropathy (356 9) (G62 9)   26  Pes planus of both feet (734) (M21 41,M21 42)   27  Plantar fasciitis of left foot (728 71) (M72 2)   28  Restless legs syndrome (333 94) (G25 81)     Past Medical History   · History of Abnormal glucose (790 29) (R73 09)   · Atrial fibrillation (427 31) (I48 91)   · History of Breast Cancer (V10 3)   · 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)   · Esophageal reflux (530 81) (K21 9)   · Denied: History of Exposure To STD   · History of Gross hematuria (599 71) (R31 0)   · History of Hematoma (924 9) (T14 8XXA)   · History of Hematoma of lower extremity, right, initial encounter (924 5) (S80 11XA)   · History of backache (V13 59) (Z87 39)   · History of Carrero's esophagus (V12 79) (Z87 19)   · History of cataract (V12 49) (Z86 69)   · History of chest pain (V13 89) (V22 347)   · History of fall (V15 88) (Z91 81)   · History of hematuria (V13 09) (Z87 448)   · History of postmenopausal hormone replacement therapy (V87 49) (Z92 29)   · History of shortness of breath (V13 89) (I44 715)   · History of urinary incontinence (V13 09) (G91 784)   · History of Neck pain (723 1) (M54 2)   · Normal delivery (650) (O80,Z37  9)   · History of Right knee pain (719 46) (M25 561)   · History of Ringing In The Ears (Tinnitus) (388 30)   · History of Skin rash (782 1) (R21)   · History of Traumatic blister of right lower extremity, initial encounter (916 2) (N65 388D)   · History of UTI (lower urinary tract infection) (599 0) (N39 0)     The active problems and past medical history were reviewed and updated today       Surgical History   · Denied: History of Abnormal Pap Smear Of Cervix   · History of Breast Surgery Lumpectomy   · History of Cataract Surgery   · History of Diagnostic Cystoscopy   · History of Excision Lesion Of Meniscus Or Capsule Knee   · History of Pacemaker - Pulse Generator Replacement   · History of Pacemaker Placement   · History of Pacemaker Placement   · History of Radiation Therapy   · History of Total Abdominal Hysterectomy With Removal Of Both Ovaries     The surgical history was reviewed and updated today        Family History  Mother    · Denied: Family history of Alcoholism and drug addiction in family   · Denied: Family history of Anxiety and depression  Father    · Denied: Family history of Alcoholism and drug addiction in family   · Denied: Family history of Anxiety and depression   · Family history of Coronary Artery Disease (V17 49)   · Family history of abdominal aortic aneurysm (V17 49) (Z82 49)  Child    · Denied: Family history of Alcoholism and drug addiction in family   · Denied: Family history of Anxiety and depression  Sibling    · Denied: Family history of Alcoholism and drug addiction in family   · Denied: Family history of Anxiety and depression  Sister    · Family history of Breast Cancer (V16 3)  Maternal Aunt    · Family history of Colon Cancer (V16 0)   · Family history of Colon Cancer (V16 0)  Family History    · Denied: Family history of Diabetes Mellitus   · Denied: Family history of Stroke Syndrome     The family history was reviewed and updated today        Social History      · Being A Social Drinker   · Denied: History of Drug Use   · Former smoker (V15 82) (J48 173)   · 25 pack years, quit age 41   · Marital History - Currently    · Retired From Work   · 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  The social history was reviewed and updated today       The medication list was reviewed and updated today        Allergies  1  duloxetine   2  LevoFLOXacin TABS   3  Cephalexin CAPS   4  Erythromycin Base TABS   5  Keflex TABS   6  Penicillins   7  Sulfa Drugs        Physical Exam  Left Foot: Appearance: Normal except as noted: excessive pronation-- and-- pes planus  Tenderness: None except the lisfranc joint-- and-- medial longitudinal arch  ROM: subtalar motion was restricted  Right Foot: Appearance: Normal except as noted: excessive pronation-- and-- pes planus  Tenderness: None except the lisfranc joint-- and-- medial longitudinal arch  ROM: subtalar motion was restricted   Left Ankle: ROM: limited ROM in all planes   Right Ankle: ROM: limited ROM in all planes   Neurological Exam: performed  Light touch was decreased bilaterally  Vibratory sensation was decreased in both first metatarsophalangeal joints  Deep tendon reflexes: achilles reflex absent bilateraly-- and-- 4/5 L5 testing bilateral    Vascular Exam: performed Dorsalis pedis pulses were diminished bilaterally  Posterior tibial pulses were diminished bilaterally  Dependence rubor was present bilaterally  Capillary refill time was Negative digital hair noted, but-- between 1-3 seconds bilaterally  Toenails: All of the toenails were elongated,-- hypertrophied,-- discolored-- and--   Hyperkeratosis: present on both first toes  Shoe Gear Evaluation: performed ()  Recommendation(s): SAS style       Patient's shoes and socks removed  Right Foot/Ankle   Right Foot Inspection        Esme Schultz  Proprioception: diminished      Vascular  Capillary refills: elevated        Left Foot/Ankle  Left Foot Inspection                    Sensory   Vibration: diminished  Proprioception: diminished     Vascular  Capillary refills: elevated      Patient's shoes and socks removed            Assign Risk Category  Deformity present  Loss of protective sensation  Weak pulses  Risk: 2

## 2022-05-18 ENCOUNTER — REMOTE DEVICE CLINIC VISIT (OUTPATIENT)
Dept: CARDIOLOGY CLINIC | Facility: CLINIC | Age: 82
End: 2022-05-18
Payer: MEDICARE

## 2022-05-18 DIAGNOSIS — Z95.0 PRESENCE OF PERMANENT CARDIAC PACEMAKER: Primary | ICD-10-CM

## 2022-05-18 PROCEDURE — 93294 REM INTERROG EVL PM/LDLS PM: CPT | Performed by: INTERNAL MEDICINE

## 2022-05-18 PROCEDURE — 93296 REM INTERROG EVL PM/IDS: CPT | Performed by: INTERNAL MEDICINE

## 2022-05-18 NOTE — PROGRESS NOTES
Results for orders placed or performed in visit on 05/18/22   Cardiac EP device report    Narrative    MDT-SINGLE CHAMBER PPM/ NOT MRI CONDITIONAL  CARELINK TRANSMISSION: BATTERY VOLTAGE ADEQUATE  (18 MONS)  25%  ALL AVAILABLE LEAD PARAMETERS WITHIN NORMAL LIMITS  MULTIPLE VHR EPISODES DETECTED, RVR NOTED  PATIENT IS ON WARFARIN AND METOPROLOL TART  NORMAL DEVICE FUNCTION  ---[place]

## 2022-05-19 ENCOUNTER — OFFICE VISIT (OUTPATIENT)
Dept: PHYSICAL THERAPY | Facility: CLINIC | Age: 82
End: 2022-05-19
Payer: MEDICARE

## 2022-05-19 DIAGNOSIS — M17.11 PRIMARY OSTEOARTHRITIS OF RIGHT KNEE: Primary | ICD-10-CM

## 2022-05-19 DIAGNOSIS — G89.29 CHRONIC PAIN OF RIGHT KNEE: ICD-10-CM

## 2022-05-19 DIAGNOSIS — M25.561 CHRONIC PAIN OF RIGHT KNEE: ICD-10-CM

## 2022-05-19 PROCEDURE — 97112 NEUROMUSCULAR REEDUCATION: CPT

## 2022-05-19 PROCEDURE — 97110 THERAPEUTIC EXERCISES: CPT

## 2022-05-19 NOTE — PROGRESS NOTES
Daily Note     Today's date: 2022  Patient name: Luna Arteaga  : 1940  MRN: [id number]  Referring provider: Amena Hyman DO  Dx:   Encounter Diagnosis     ICD-10-CM    1  Primary osteoarthritis of right knee  M17 11    2  Chronic pain of right knee  M25 561     G89 29                   Subjective: Pt reports that she has pain 6-7/10 today, was very sore the day after last session  Feels that pain is around knee, not in knee  Feels that leg press may have been too much  Objective: squat to 25% w/ fair glute drive, no pain w/ PROM, fair quad set today      Assessment: Tolerated treatment well  Patient demonstrated fatigue post treatment and would benefit from continued PT  Pt does well w/ gentle progressions today, felt good after easy exercise program  Will slowly add in higher level exercise progressions as sx allow  Pt educated on DOMS and ways to help combat this  Shows good understanding  Plan: Continue per plan of care   gentle squat progressions, leg press if able      Eval/ Re-eval Auth #/ Referral # Total visits Start date  Expiration date Total active units  Total manual units  PT only or  PT+OT?   3-21-22 (pre-op) NA NA NA NA NA NA NA   3-31-22 (post-op)                                              Precautions: B diabetic neuropathy, DM II, L TKA 2020    Past Medical History:   Diagnosis Date    Atrial fibrillation ([facility] [address] )     Carrero's esophagus     last assessed: 2018    BRCA1 negative     BRCA2 negative     Breast cancer ([facility] [address] )     stage 1 (left), given adjuvant radiation with Arimidex x 5 years    Cancer [facility])     Left Breast, Lumpectomy    Cataract     last assessed: 3/11/2014    Dysplasia of toenail     last assessed: 2017    Esophageal reflux     GERD (gastroesophageal reflux disease)     Gross hematuria     last assessed: 2015    Hematuria     Hiatal hernia     History of radiation therapy     Hypertension     Irregular heart beat     AFIB    Mixed sensory-motor polyneuropathy     Neuropathy     Obesity     Pacemaker     Paroxysmal atrial fibrillation (HCC)     Peripheral neuropathy     Rectal bleeding     Restless leg syndrome     Shortness of breath     last assessed: 1/11/2016         Date 5/2/22 5/5/22 5/9/22 5/12/22 5/16/22 (PN) 5/19    Visit Number [id number] [phone] 10  11 12   AROM         Knee Flexion 98  97  112 112   Knee Extension 0  0  0  0            Manual         Patellar mobs         STM                           TherEx         Knee/Hip PROM x5-6 mins  performed    performed knee flexion and ext x4 mins total same x4 min   Active w/u         Quad set, glute set x20 5"x20 5 sec x 20 each  5 sec x 10  Each  x20 total  X 20 total   Ankle pumps         SLR 2x10-15  2x10-15 AA 10 x 2 AAROM  AAROM 10 x  (pt deferred further reps) Active x 20 total  SAQ x 20    SLR x10   Hip abduction         Knee ext: SAQ, LAQ   LAQ: 5 sec x 10   LAQ: 5 sec x 10   x20    Heel slides Heel raises x 20-30 total HR x30 -- HR: 30x  rev Calf stretch in standing over half foam roll x 3 mins    Squat at bar x 20 total Squats x20 20x at bar  -- Squats at bar 2x10      R side knee PROM w/ HS stretching x 6-7 mins  R side knee PROM w/ HS stretching x 6-7 mins R side knee PROM w/ HS stretching x 6-7 mins Performed  HS and glute/piri stretching x5 mins  HS and piri stretching x 3-4 mins     3x10 w/ ad sq bridging  3x10 w/ ad sq bridging 3x10 w/ ad sq bridging -- Bridging x 20 total          Squat at bar x20     4" ant stair taps x 20        Leg press 65-75# 4x10 total     NuStep    lv 1 5 min  Pre 10 min lvl 2-3     TherAct         Patient education x2-3 mins for POC and HEP rev    Rev x2-3 mins Rev x5 mins     Stair negotiation         Car transfer   Standing knee flexion AROM 2 x 10   2 x 10   Progress note measurements x 5 mins      exag gait at bar x 2-3 mins     exag gait x 3 laps at bar  exag gait x 2-3 laps     6" ant step up x20, 4" ant tap down x 10, 4" lat step ups x20  6" ant step up x20, 4" ant tap down x 10, 4" lat step ups x20 6" ant step up x20, 4" ant tap down x 20, 4" lat step ups x20 -- 6" step up/down x 20 total    Gait Training            side step at bar x 3-4 mins                         Modalities         CP Home  5 min   10 min post

## 2022-05-22 PROBLEM — R07.89 OTHER CHEST PAIN: Status: ACTIVE | Noted: 2022-05-22

## 2022-05-22 PROBLEM — Z79.01 CHRONIC ANTICOAGULATION: Status: ACTIVE | Noted: 2022-05-22

## 2022-05-22 NOTE — PROGRESS NOTES
Cardiology  Acute  Office Visit Note  Julio Rodriguez   80 y o    female   MRN: [id number]  [address]  [address] [facility] [address] [facility] Eloina Herrera [address]  [phone]    PCP: Ulises Rob MD  Cardiologist: Dr Fortune Shows            Summary of recommendations  Low-sodium diet  Educational information provided  Increase Lasix to 40 mg b i d  X1 week then decrease back down to Lasix 40 mg daily  She has a propensity to hyperkalemia  Will not add potassium supplement at this time  BMP 2 weeks  echocardiogram  Follow up will be scheduled with Dr Fortune Shows 3 months  Colon Ca screenin2020 , up-to-date        Assessment/plan  Chest pain  Likely related to volume overload, leading to AFib with RVR  She had a recent  Ed visit 22  Her symptoms date back to March when she had knee surgery  proBNP over 1000  -> will increase Lasix to 40 mg b i d  X1 week then go back down to daily  She does admit that sometimes she does not take her daily Lasix if she is going to be out  Will get a follow-up BMP in 2 weeks  Will get an updated echo  Chronic A fib  Her beta-blocker was recently uptitrated, currently on metoprolol tartrate 75 mg twice a day starting 3/21/22  On oral anticoagulation with warfarin monitored by [facility]  Goal INR 2-3   22: Last INR 2 45    -EKG today A fib 79 bpm  freq vent paced beats  Will treat the extra volume  I will not increase the beta-blocker today  S/P MDT PPM  Interrogation  22  NOT MRI conditional   25%  Multiple VHR episodes detected  RVR noted  Valvular heart disease last TTE 21  Will get an updated echo  · Mild-to-moderate aortic stenosis  · Mild-to-moderate mitral regurgitation  · Moderate severe  tricupsid regurgitation  Pulmonary hypertension  On furosemide 40 mg daily  Occasionally she misses  Today, will increase to 40 b i d  X1 week then back down to 40 daily    BMP 2 weeks  Hypertension, essential   BP 134/101  on metoprolol tartrate 75 mg q 12, loop diuretic  Hyperlipidemia, on atorvastatin 40 mg daily  10/5/21:  LDL 80   Non HDL 94  Breast cancer  History of Carrero's esophagus  Cardiac testing   TTE 5/4/21  EF  55%  No definitive regional wall motion abnormality seen  A cannot be excluded on the basis of this study  RV the upper limit of normal   Borderline RV systolic function  Moderate left atrial enlargement  Marked right enlargement  Mild-to-moderate MR  Aortic valve is probably trileaflet  Leaflets show thickening with mild calcification and reduced cuspal separation  Trace AI  Mild-to-moderate aortic stenosis  Mean/peak gradient is 16/32 mm Hg, GABRIEL is 1 1 cm2, DVI is 0 33  Moderate severe TR  There is moderate pulmonary hypertension with RV systolic pressure of 10-03 mm Hg   COMPARISONS:Compared to previous echo from 11/28/2018, valvular heart disease has increased along with PA pressure                   HPI  Arline Pacheco is a 79 yo female with chronic atrial fibrillation, mild-to-moderate aortic stenosis, essential hypertension  She had a prior pacemaker  She follows with Dr Tam Osullivan  She has been stable on anticoagulation with warfarin  She was last seen in the office in February 2022; stable  She was having some fatigue and arthritis sx     5/23/22  Acute visit   Went to the ED 4/17/22 Had an episode of intense chest pounding 10 min  Recurred x 3  Symptoms occurred in the middle of night  After workup she was provided a prescription for Protonix  Her EKG showed AFib 91 beats per minute  CMP unremarkable, lipase normal, magnesium 2 3, CBC stable, INR 1 79, proBNP 1080, troponin 11, chest x-ray no active disease, troponin 10  Venous duplex 4/29/22:  No DVT  ROS:  She has had no recurrence of the chest pounding  She has significant right lower extremity edema ever since her knee replacement in March  She has some swelling on the left side but not as pronounced  Admits to LU    She lives in a house with steps  Occasional palpitations  EKG atrial fibrillation, 97 beats per minute with frequent ventricular paced beats  Pacer interrogation 5/18/22:  AFib with multiple VHR episodes, VHR  /104  Wt Readings from Last 3 Encounters:   05/23/22 103 kg (226 lb)   05/17/22 104 kg (230 lb)   05/12/22 104 kg (230 lb)   Assessment:  Volume overload  Plan:  Increase Lasix to 40 b i d  X1 week, then decrease to daily  Follow-up labs 2 weeks-          I have spent 45 minutes with Patient and family today in which greater than 50% of this time was spent in counseling/coordination of care regarding Intructions for management, Patient and family education, Importance of tx compliance and Risk factor reductions  Assessment  Diagnoses and all orders for this visit:    Chronic atrial fibrillation ([facility] )  -     Echo complete w/ contrast if indicated; Future  -     POCT ECG    Essential hypertension  -     Echo complete w/ contrast if indicated; Future  -     POCT ECG    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HCC)    Chronic kidney disease (CKD), stage II (mild)    Severe obesity (BMI 35 0-39  9) with comorbidity ([place] [address] )    Other chest pain  -     Echo complete w/ contrast if indicated; Future  -     POCT ECG    Pacemaker    Chronic anticoagulation    Atrial fibrillation, unspecified type (HCC)  -     furosemide (LASIX) 40 mg tablet; Take one tablet twice a day x 1 week, then decrease to one tablet daily    LU (dyspnea on exertion)  -     furosemide (LASIX) 40 mg tablet; Take one tablet twice a day x 1 week, then decrease to one tablet daily  -     Basic metabolic panel; Future  -     NT-BNP PRO;  Future    Stage 3a chronic kidney disease ([facility] )    Other hypervolemia          Past Medical History:   Diagnosis Date    Arthritis     Atrial fibrillation ([facility] )     Carrero's esophagus     last assessed: 1/23/2018    BRCA1 negative     BRCA2 negative     Breast cancer ([facility] ) 2006    stage 1 (left), given adjuvant radiation with Arimidex x 5 years    Cancer [facility]) 2006    Left Breast, Lumpectomy    Cataract     last assessed: 3/11/2014    Dysplasia of toenail     last assessed: 8/29/2017    Esophageal reflux     GERD (gastroesophageal reflux disease)     Gross hematuria     last assessed: 2/19/2015    Hematuria     Hiatal hernia     History of radiation therapy     Hypertension     Irregular heart beat     AFIB    Mixed sensory-motor polyneuropathy     Neuropathy     Obesity     Pacemaker     Paroxysmal atrial fibrillation (HCC)     Peripheral neuropathy     Rectal bleeding     Restless leg syndrome     Shortness of breath     last assessed: 1/11/2016       Review of Systems   Constitutional: Positive for malaise/fatigue  Negative for chills  Cardiovascular: Positive for dyspnea on exertion and leg swelling  Negative for chest pain, claudication, cyanosis, irregular heartbeat, near-syncope, orthopnea, palpitations, paroxysmal nocturnal dyspnea and syncope  Respiratory: Positive for shortness of breath  Negative for cough  Musculoskeletal: Positive for arthritis  Gastrointestinal: Negative for heartburn and nausea  Neurological: Negative for dizziness, focal weakness, headaches, light-headedness and weakness  All other systems reviewed and are negative  Allergies   Allergen Reactions    Latex      Added based on information entered during case entry, please review and add reactions, type, and severity as needed    Cephalexin Rash    Duloxetine Hcl Other (See Comments)     Facial pins and needles sensation    Erythromycin Rash    Levofloxacin Other (See Comments)     Muscular aches    Penicillins Rash    Savella [Milnacipran] Rash    Sulfa Antibiotics Rash           Current Outpatient Medications:     acetaminophen (TYLENOL) 325 mg tablet, Take 3 tablets (975 mg total) by mouth every 8 (eight) hours, Disp: , Rfl: 0    Calcium Acetate, Phos Binder, (CALCIUM ACETATE PO), Take by mouth daily  , Disp: , Rfl:     clobetasol (TEMOVATE) 0 05 % ointment, Apply once weekly to the affected area, Disp: 30 g, Rfl: 3    docusate sodium (COLACE) 100 mg capsule, Take 1 capsule (100 mg total) by mouth 2 (two) times a day, Disp: 60 capsule, Rfl: 0    fluticasone (FLONASE) 50 mcg/act nasal spray, SPRAY 1 SPRAY INTO EACH NOSTRIL EVERY DAY, Disp: 16 mL, Rfl: 1    furosemide (LASIX) 40 mg tablet, Take one tablet twice a day x 1 week, then decrease to one tablet daily, Disp: 90 tablet, Rfl: 3    gabapentin (NEURONTIN) 800 mg tablet, Take 1 tablet (800 mg total) by mouth 4 (four) times a day, Disp: 360 tablet, Rfl: 1    loratadine (CLARITIN) 10 mg tablet, Take 10 mg by mouth daily, Disp: , Rfl:     magnesium 30 MG tablet, Take 30 mg by mouth 2 (two) times a day, Disp: , Rfl:     metoprolol tartrate (LOPRESSOR) 50 mg tablet, Take 1 5 tablets (75 mg total) by mouth 2 (two) times a day, Disp: 180 tablet, Rfl: 3    multivitamin (THERAGRAN) TABS, Take 1 tablet by mouth daily, Disp: , Rfl:     oxyCODONE (Roxicodone) 5 immediate release tablet, Take 1/2 to 1 tablets by mouth every 4-6 hours as needed for pain, Disp: 30 tablet, Rfl: 0    warfarin (COUMADIN) 7 5 mg tablet, TAKE 1 TABLET BY MOUTH EVERY DAY, Disp: 90 tablet, Rfl: 3    docusate sodium (COLACE) 100 mg capsule, Take 1 capsule (100 mg total) by mouth 2 (two) times a day as needed for constipation for up to 5 days, Disp: 10 capsule, Rfl: 0    famotidine (PEPCID) 40 MG tablet, TAKE 1 TABLET BY MOUTH EVERY DAY (Patient not taking: No sig reported), Disp: 90 tablet, Rfl: 1    Flovent  MCG/ACT inhaler, INHALE 2 PUFFS 2 (TWO) TIMES A DAY RINSE MOUTH AFTER USE  (Patient not taking: Reported on 5/23/2022), Disp: 12 g, Rfl: 1    pantoprazole (PROTONIX) 20 mg tablet, Take 1 tablet (20 mg total) by mouth daily (Patient not taking: No sig reported), Disp: 20 tablet, Rfl: 0    polyethylene glycol (MIRALAX) 17 g packet, Take 17 g by mouth daily for 5 days, Disp: 85 g, Rfl: 0    pramipexole (MIRAPEX) 0 5 mg tablet, 2 FULL TABLETS TWICE A DAY AT 5:00 P  M  AND 10:00 P M  (Patient not taking: Reported on 2022), Disp: 360 tablet, Rfl: 1        Social History     Socioeconomic History    Marital status: /Civil Union     Spouse name: Not on file    Number of children: Not on file    Years of education: Not on file    Highest education level: Not on file   Occupational History    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      Comment: retired   Tobacco Use    Smoking status: Former Smoker     Packs/day: 1 00     Years: 25 00     Pack years: 25 00     Types: Cigarettes     Quit date: 1980     Years since quittin 6    Smokeless tobacco: Never Used    Tobacco comment: Quit over 30 years ago; quit age 41   Vaping Use    Vaping Use: Never used   Substance and Sexual Activity    Alcohol use: Not Currently    Drug use: No    Sexual activity: Not on file   Other Topics Concern    Not on file   Social History Narrative         Social Determinants of Health     Financial Resource Strain: Not on file   Food Insecurity: No Food Insecurity    Worried About [facility] in the Last Year: Never true    [address] [facility] [address] in the Last Year: Never true   Transportation Needs: No Transportation Needs    Lack of Transportation (Medical): No    Lack of Transportation (Non-Medical):  No   Physical Activity: Not on file   Stress: Not on file   Social Connections: Not on file   Intimate Partner Violence: Not on file   Housing Stability: Low Risk     Unable to Pay for Housing in the Last Year: No    Number of Places Lived in the Last Year: 1    Unstable Housing in the Last Year: No       Family History   Problem Relation Age of Onset    Hypertension Mother     Heart disease Father     Aneurysm Father     Coronary artery disease Father         in his 76s with aneurysm    Aortic aneurysm Father         abdominal    Scleroderma Sister    Julieann Frankel Breast cancer Sister 76    Hypertension Sister     Cancer Sister     No Known Problems Son     No Known Problems Son     Testicular cancer Son 39    Thyroid cancer Son 45    Colon cancer Maternal Aunt     Colon cancer Maternal Aunt     Breast cancer Other 48        kaylee's daughter    Alcohol abuse Neg Hx     Substance Abuse Neg Hx     Mental illness Neg Hx     Depression Neg Hx        Physical Exam  Vitals and nursing note reviewed  Constitutional:       General: She is not in acute distress  Appearance: She is obese  She is not diaphoretic  HENT:      Head: Normocephalic and atraumatic  Eyes:      Conjunctiva/sclera: Conjunctivae normal    Cardiovascular:      Rate and Rhythm: Normal rate and regular rhythm  Pulses: Intact distal pulses  Heart sounds: Murmur heard  Harsh midsystolic murmur is present with a grade of 3/6 at the upper right sternal border radiating to the neck  Pulmonary:      Effort: Pulmonary effort is normal       Breath sounds: Examination of the right-lower field reveals rales  Examination of the left-lower field reveals rales  Rales present  Abdominal:      General: Bowel sounds are normal       Palpations: Abdomen is soft  Musculoskeletal:         General: Normal range of motion  Cervical back: Normal range of motion and neck supple  Right lower leg: Edema present  Left lower leg: Edema present  Comments: Right lower extremity greater than left lower extremity edema  She uses a cane p r n  Skin:     General: Skin is warm and dry  Neurological:      Mental Status: She is alert and oriented to person, place, and time  Vitals: Blood pressure (!) 134/104, pulse 79, height 5' 4 5" (1 638 m), weight 103 kg (226 lb), SpO2 94 %     Wt Readings from Last 3 Encounters:   05/23/22 103 kg (226 lb)   05/17/22 104 kg (230 lb)   05/12/22 104 kg (230 lb)         Labs & Results:  Lab Results   Component Value Date    WBC 7 74 04/17/2022    HGB 11 4 (L) 04/17/2022    HCT 36 2 04/17/2022    MCV 96 04/17/2022     04/17/2022     No results found for: BNP  No components found for: CHEM  Troponin I   Date Value Ref Range Status   06/06/2019 <0 02 <=0 04 ng/mL Final     Comment:     3Autovalidation override  Siemens Chemistry analyzer 99% cutoff is > 0 04 ng/mL in network labs     o cTnI 99% cutoff is useful only when applied to patients in the clinical setting of myocardial ischemia   o cTnI 99% cutoff should be interpreted in the context of clinical history, ECG findings and possibly cardiac imaging to establish correct diagnosis  o cTnI 99% cutoff may be suggestive but clearly not indicative of a coronary event without the clinical setting of myocardial ischemia  06/06/2019 <0 02 <=0 04 ng/mL Final     Comment:     3Autovalidation override  Siemens Chemistry analyzer 99% cutoff is > 0 04 ng/mL in network labs     o cTnI 99% cutoff is useful only when applied to patients in the clinical setting of myocardial ischemia   o cTnI 99% cutoff should be interpreted in the context of clinical history, ECG findings and possibly cardiac imaging to establish correct diagnosis  o cTnI 99% cutoff may be suggestive but clearly not indicative of a coronary event without the clinical setting of myocardial ischemia  11/29/2018 <0 02 <=0 04 ng/mL Final     Comment:     3Autovalidation override  Siemens Chemistry analyzer 99% cutoff is > 0 04 ng/mL in network labs     o cTnI 99% cutoff is useful only when applied to patients in the clinical setting of myocardial ischemia   o cTnI 99% cutoff should be interpreted in the context of clinical history, ECG findings and possibly cardiac imaging to establish correct diagnosis  o cTnI 99% cutoff may be suggestive but clearly not indicative of a coronary event without the clinical setting of myocardial ischemia         Results for orders placed during the hospital encounter of 05/04/21    Echo complete with contrast if indicated    Narrative  [address]  [address] [facility]  [phone]    Transthoracic Echocardiogram  2D, M-mode, Doppler, and Color Doppler    Study date:  04-May-2021    Patient: Isidoro Andrews  MR number: [id number]  Account number: [de-identified]  : 1940  Age: 68 years  Gender: Female  Status: Outpatient  Location: Echo lab  Height: 64 5 in  Weight: 223 5 lb  BP: 132/ 80 mmHg    Indications: A Fib  Diagnoses: I48 1 - Atrial flutter    Sonographer:  MORRIS Kirk  Primary Physician:  Liza Brumfield MD  Referring Physician:  Jarrod Peterson MD  Group:  [place] [id number] [facility]  Interpreting Physician:  Sharon Howe MD    SUMMARY    LEFT VENTRICLE:  Normal left ventricular chamber dimension  There is mild concentric left ventricular  Left ventricular ejection fraction is about 55%  No definite regional wall motion abnormality seen, although it cannot be completely excluded from this study  Indeterminate diastolic function because of underlying mitral annular calcification    RIGHT VENTRICLE:  RV size is at upper limit of normal  Borderline RV systolic function  Device lead visualized in RV cavity    LEFT ATRIUM:  Moderately dilated left atrium    RIGHT ATRIUM:  Markedly dilated right atrium  Device lead visualized in right atrium    MITRAL VALVE:  Mildly thickened mitral valve leaflets with moderate annular calcification  There is mild to moderate mitral regurgitation  There was no evidence of mitral stenosis    AORTIC VALVE:  Aortic valve is probably trileaflet  Leaflets show thickening with mild calcification and reduced cuspal separation  There is trace aortic regurgitation  There is mild to moderate aortic stenosis  Mean/peak gradient is 16/32 mm Hg, GABRIEL is 1 1 cm2, DVI is 0 33    TRICUSPID VALVE:  Moderate to severe tricuspid regurgitation    There is moderate pulmonary hypertension with RV systolic pressure of 59-61 mm Hg    PULMONIC VALVE:  There is mild pulmonary regurgitation    COMPARISONS:  Compared to previous echo from 11/28/2018, valvular heart disease has increased along with PA pressure    HISTORY: PRIOR HISTORY: Breast cancer,Carrero's esophagus,gastroesophageal reflux disease,pacemaker,diabetes,HTN  PROCEDURE: The procedure was performed in the echo lab  This was a routine study  The transthoracic approach was used  The study included complete 2D imaging, M-mode, complete spectral Doppler, and color Doppler  The heart rate was 88 bpm,  at the start of the study  Images were obtained from the parasternal, apical, subcostal, and suprasternal notch acoustic windows  Image quality was adequate  LEFT VENTRICLE: Normal left ventricular chamber dimension  There is mild concentric left ventricular  Left ventricular ejection fraction is about 55%  No definite regional wall motion abnormality seen, although it cannot be completely excluded from this study  Indeterminate diastolic function because of underlying mitral annular calcification    RIGHT VENTRICLE: RV size is at upper limit of normal  Borderline RV systolic function  Device lead visualized in RV cavity    LEFT ATRIUM: Moderately dilated left atrium    RIGHT ATRIUM: Markedly dilated right atrium  Device lead visualized in right atrium    MITRAL VALVE: Mildly thickened mitral valve leaflets with moderate annular calcification  There is mild to moderate mitral regurgitation  There was no evidence of mitral stenosis    AORTIC VALVE: Aortic valve is probably trileaflet  Leaflets show thickening with mild calcification and reduced cuspal separation  There is trace aortic regurgitation  There is mild to moderate aortic stenosis  Mean/peak gradient is 16/32 mm Hg, GABRIEL is 1 1 cm2, DVI is 0 33    TRICUSPID VALVE: Moderate to severe tricuspid regurgitation    There is moderate pulmonary hypertension with RV systolic pressure of 67-12 mm Hg    PULMONIC VALVE: There is mild pulmonary regurgitation    AORTA: Normal aortic root diameter    SYSTEM MEASUREMENT TABLES    2D  EF (Teich): 40 48 %  %FS: 19 79 %  Ao Diam: 3 13 cm  EDV(Teich): 114 46 ml  ESV(Teich): 68 13 ml  IVSd: 1 16 cm  LA Area: 29 65 cm2  LA Diam: 4 68 cm  LVIDd: 4 93 cm  LVIDs: 3 95 cm  LVOT Diam: 1 89 cm  LVPWd: 1 11 cm  RA Area: 28 31 cm2  RVIDd: 3 84 cm  SV (Teich): 46 33 ml    CW  AV Env  Ti: 342 53 ms  AV VTI: 60 06 cm  AV Vmax: 2 84 m/s  AV Vmean: 1 75 m/s  AV maxP 25 mmHg  AV meanP 09 mmHg  TR Vmax: 3 73 m/s  TR maxP 66 mmHg    MM  TAPSE: 1 19 cm    PW  MV E/A Ratio: 3 63  E' Sept: 0 08 m/s  E/E' Sept: 16 34  LVOT Env  Ti: 296 86 ms  LVOT VTI: 19 53 cm  LVOT Vmax: 1 07 m/s  LVOT Vmean: 0 66 m/s  LVOT maxP 59 mmHg  LVOT meanP 17 mmHg  MV A Mynor: 0 35 m/s  MV Dec Pinal: 7 43 m/s2  MV DecT: 171 81 ms  MV E Mynor: 1 28 m/s  MV PHT: 49 82 ms  MVA By PHT: 4 42 cm2    [address] Accredited Echocardiography Laboratory    Prepared and electronically signed by    Francesca Carvajal MD  Signed 04-May-2021 14:07:15    No results found for this or any previous visit  This note was completed in part utilizing m-xPeerient fluency direct voice recognition software  Grammatical errors, random word insertion, spelling mistakes, and incomplete sentences may be an occasional consequence of the system secondary to software limitations, ambient noise and hardware issues  At the time of dictation, efforts were made to edit, clarify and /or correct errors  Please read the chart carefully and recognize, using context, where substitutions have occurred    If you have any questions or concerns about the context, text or information contained within the body of this dictation, please contact myself, the provider, for further clarification

## 2022-05-23 ENCOUNTER — OFFICE VISIT (OUTPATIENT)
Dept: CARDIOLOGY CLINIC | Facility: CLINIC | Age: 82
End: 2022-05-23
Payer: MEDICARE

## 2022-05-23 ENCOUNTER — OFFICE VISIT (OUTPATIENT)
Dept: PHYSICAL THERAPY | Facility: CLINIC | Age: 82
End: 2022-05-23
Payer: MEDICARE

## 2022-05-23 VITALS
DIASTOLIC BLOOD PRESSURE: 104 MMHG | WEIGHT: 226 LBS | HEART RATE: 79 BPM | HEIGHT: 65 IN | BODY MASS INDEX: 37.65 KG/M2 | OXYGEN SATURATION: 94 % | SYSTOLIC BLOOD PRESSURE: 134 MMHG

## 2022-05-23 DIAGNOSIS — N18.31 STAGE 3A CHRONIC KIDNEY DISEASE (HCC): ICD-10-CM

## 2022-05-23 DIAGNOSIS — Z95.0 PACEMAKER: ICD-10-CM

## 2022-05-23 DIAGNOSIS — G89.29 CHRONIC PAIN OF RIGHT KNEE: ICD-10-CM

## 2022-05-23 DIAGNOSIS — Z79.01 CHRONIC ANTICOAGULATION: ICD-10-CM

## 2022-05-23 DIAGNOSIS — R07.89 OTHER CHEST PAIN: ICD-10-CM

## 2022-05-23 DIAGNOSIS — E87.79 OTHER HYPERVOLEMIA: ICD-10-CM

## 2022-05-23 DIAGNOSIS — E66.01 SEVERE OBESITY (BMI 35.0-39.9) WITH COMORBIDITY (HCC): ICD-10-CM

## 2022-05-23 DIAGNOSIS — I48.20 CHRONIC ATRIAL FIBRILLATION (HCC): Primary | ICD-10-CM

## 2022-05-23 DIAGNOSIS — M25.561 CHRONIC PAIN OF RIGHT KNEE: ICD-10-CM

## 2022-05-23 DIAGNOSIS — N18.2 CHRONIC KIDNEY DISEASE (CKD), STAGE II (MILD): ICD-10-CM

## 2022-05-23 DIAGNOSIS — I70.209 PERIPHERAL ARTERIOSCLEROSIS (HCC): ICD-10-CM

## 2022-05-23 DIAGNOSIS — M17.11 PRIMARY OSTEOARTHRITIS OF RIGHT KNEE: Primary | ICD-10-CM

## 2022-05-23 DIAGNOSIS — R06.00 DOE (DYSPNEA ON EXERTION): ICD-10-CM

## 2022-05-23 DIAGNOSIS — I48.91 ATRIAL FIBRILLATION, UNSPECIFIED TYPE (HCC): ICD-10-CM

## 2022-05-23 DIAGNOSIS — I10 ESSENTIAL HYPERTENSION: ICD-10-CM

## 2022-05-23 PROCEDURE — 99215 OFFICE O/P EST HI 40 MIN: CPT | Performed by: NURSE PRACTITIONER

## 2022-05-23 PROCEDURE — 93000 ELECTROCARDIOGRAM COMPLETE: CPT | Performed by: NURSE PRACTITIONER

## 2022-05-23 PROCEDURE — 97110 THERAPEUTIC EXERCISES: CPT

## 2022-05-23 RX ORDER — FUROSEMIDE 40 MG/1
TABLET ORAL
Qty: 90 TABLET | Refills: 3 | Status: SHIPPED | OUTPATIENT
Start: 2022-05-23 | End: 2022-08-09 | Stop reason: ALTCHOICE

## 2022-05-23 NOTE — PROGRESS NOTES
Daily Note     Today's date: 2022  Patient name: Rekha Solorio  : 1940  MRN: [id number]  Referring provider: Wiliam Mcardle, DO  Dx:   Encounter Diagnosis     ICD-10-CM    1  Primary osteoarthritis of right knee  M17 11    2  Chronic pain of right knee  M25 561     G89 29                   Subjective: Pt reports that she has had significant soreness following sessions recently  Notes that she feels she has to take 2 days off after sessions due to soreness  Not sure why, as last session felt easy  Objective: requires cueing for proper quad activation during LAQ, corrects but fatigues quickly  Assessment: Tolerated treatment fair  Patient demonstrated fatigue post treatment, exhibited good technique with therapeutic exercises and would benefit from continued PT  Session intensity significantly reduced  She was not given any progressions today in an effort to monitor sx  Should she continue to have significant post session soreness I will reach out to referring MD  She was educated on DOMS and need for active rest  Will increase as able  Plan: Continue per plan of care   monitor sx      Eval/ Re-eval Auth #/ Referral # Total visits Start date  Expiration date Total active units  Total manual units  PT only or  PT+OT?   3-21-22 (pre-op) NA NA NA NA NA NA NA   3-31-22 (post-op)                                              Precautions: B diabetic neuropathy, DM II, L TKA 2020    Past Medical History:   Diagnosis Date    [facility] fibrillation ([facility] )     Carrero's esophagus     last assessed: 2018    BRCA1 negative     BRCA2 negative     Breast cancer ([facility] [address] )     stage 1 (left), given adjuvant radiation with Arimidex x 5 years    Cancer [facility])     Left Breast, Lumpectomy    Cataract     last assessed: 3/11/2014    Dysplasia of toenail     last assessed: 2017    Esophageal reflux     GERD (gastroesophageal reflux disease)     Gross hematuria     last assessed: 2/19/2015    Hematuria     Hiatal hernia     History of radiation therapy     Hypertension     Irregular heart beat     AFIB    Mixed sensory-motor polyneuropathy     Neuropathy     Obesity     Pacemaker     Paroxysmal atrial fibrillation (HCC)     Peripheral neuropathy     Rectal bleeding     Restless leg syndrome     Shortness of breath     last assessed: 1/11/2016         Date 5/13/22 5/9/22 5/12/22 5/16/22 (PN) 5/19    Visit Number 13  9 10  11 12   AROM         Knee Flexion NT  97  112 112   Knee Extension   0  0  0            Manual         Patellar mobs         STM                           TherEx         Knee/Hip PROM x5-6 mins  performed    performed knee flexion and ext x4 mins total same x4 min   Active w/u         Quad set, glute set x20 5"x20 5 sec x 20 each  5 sec x 10  Each  x20 total  X 20 total   Ankle pumps         SLR LAQ x20 2x10-15 AA 10 x 2 AAROM  AAROM 10 x  (pt deferred further reps) Active x 20 total  SAQ x 20    SLR x10   Hip abduction         Knee ext: SAQ, LAQ SAQ x 20   LAQ: 5 sec x 10   LAQ: 5 sec x 10   x20    Heel slides Heel raises x 20-30 total HR x30 -- HR: 30x  rev Calf stretch in standing over half foam roll x 3 mins     Squats x20 20x at bar  -- Squats at bar 2x10      R side knee PROM w/ HS stretching x 6-7 mins  R side knee PROM w/ HS stretching x 6-7 mins R side knee PROM w/ HS stretching x 6-7 mins Performed  HS and glute/piri stretching x5 mins  HS and piri stretching x 3-4 mins     3x10 bridging  3x10 w/ ad sq bridging 3x10 w/ ad sq bridging -- Bridging x 20 total          Squat at bar x20     4" ant stair taps x 20        Leg press 65-75# 4x10 total     NuStep x6 mins lvl 2-3    lv 1 5 min  Pre 10 min lvl 2-3     TherAct         Patient education x4 mins for POC and HEP rev    Rev x2-3 mins Rev x5 mins     Stair negotiation         Car transfer   Standing knee flexion AROM 2 x 10   2 x 10   Progress note measurements x 5 mins          exag gait x 3 laps at bar  exag gait x 2-3 laps      6" ant step up x20, 4" ant tap down x 10, 4" lat step ups x20 6" ant step up x20, 4" ant tap down x 20, 4" lat step ups x20 -- 6" step up/down x 20 total    Gait Training            side step at bar x 3-4 mins                         Modalities         CP Home  5 min   10 min post

## 2022-05-23 NOTE — LETTER
May 23, 2022     Johan Jenkins MD  [address]  [id number] [place] [address] Road [facility]    Patient: Ladan Ambrose   YOB: 1940   Date of Visit: 2022       Dear Dr Linda Yeboah: Thank you for referring Dipti Jones to me for evaluation  Below are my notes for this consultation  If you have questions, please do not hesitate to call me  I look forward to following your patient along with you  Sincerely,        RICKY Esparza        CC: No Recipients  RICKY Esparza  2022  1:35 PM  Sign when Signing Visit  Cardiology  Acute  Office Visit Note  Ladan Ambrose   80 y o    female   MRN: [id number]  [address]  [address] [facility] [address] [facility] Linnea [place] [address]  [phone]  [phone]    PCP: Johan Jenkins MD  Cardiologist: Dr Krista Dumont            Summary of recommendations  Low-sodium diet  Educational information provided  Increase Lasix to 40 mg b i d  X1 week then decrease back down to Lasix 40 mg daily  She has a propensity to hyperkalemia  Will not add potassium supplement at this time  BMP 2 weeks  echocardiogram  Follow up will be scheduled with Dr Krista Dumont 3 months  Colon Ca screenin2020 , up-to-date        Assessment/plan  Chest pain  Likely related to volume overload, leading to AFib with RVR  She had a recent  Ed visit 22  Her symptoms date back to March when she had knee surgery  proBNP over 1000  -> will increase Lasix to 40 mg b i d  X1 week then go back down to daily  She does admit that sometimes she does not take her daily Lasix if she is going to be out  Will get a follow-up BMP in 2 weeks  Will get an updated echo  Chronic A fib  Her beta-blocker was recently uptitrated, currently on metoprolol tartrate 75 mg twice a day starting 3/21/22  On oral anticoagulation with warfarin monitored by [facility]  Goal INR 2-3   22: Last INR 2 45    -EKG today A fib 79 bpm  freq vent paced beats    Will treat the extra volume  I will not increase the beta-blocker today  S/P MDT PPM  Interrogation  5/18/22  NOT MRI conditional   25%  Multiple VHR episodes detected  RVR noted  Valvular heart disease last TTE 5/24/21  Will get an updated echo  · Mild-to-moderate aortic stenosis  · Mild-to-moderate mitral regurgitation  · Moderate severe  tricupsid regurgitation  Pulmonary hypertension  On furosemide 40 mg daily  Occasionally she misses  Today, will increase to 40 b i d  X1 week then back down to 40 daily  BMP 2 weeks  Hypertension, essential   /101  on metoprolol tartrate 75 mg q 12, loop diuretic  Hyperlipidemia, on atorvastatin 40 mg daily  10/5/21:  LDL 80   Non HDL 94  Breast cancer  History of Carrero's esophagus  Cardiac testing   TTE 5/4/21  EF  55%  No definitive regional wall motion abnormality seen  A cannot be excluded on the basis of this study  RV the upper limit of normal   Borderline RV systolic function  Moderate left atrial enlargement  Marked right enlargement  Mild-to-moderate MR  Aortic valve is probably trileaflet  Leaflets show thickening with mild calcification and reduced cuspal separation  Trace AI  Mild-to-moderate aortic stenosis  Mean/peak gradient is 16/32 mm Hg, GABRIEL is 1 1 cm2, DVI is 0 33  Moderate severe TR  There is moderate pulmonary hypertension with RV systolic pressure of 89-35 mm Hg   COMPARISONS:Compared to previous echo from 11/28/2018, valvular heart disease has increased along with PA pressure                   HPI  Wicho Garcia is a 79 yo female with chronic atrial fibrillation, mild-to-moderate aortic stenosis, essential hypertension  She had a prior pacemaker  She follows with Dr Jaiden Benson  She has been stable on anticoagulation with warfarin  She was last seen in the office in February 2022; stable  She was having some fatigue and arthritis sx     5/23/22  Acute visit   Went to the ED 4/17/22 Had an episode of intense chest pounding 10 min  Recurred x 3  Symptoms occurred in the middle of night  After workup she was provided a prescription for Protonix  Her EKG showed AFib 91 beats per minute  CMP unremarkable, lipase normal, magnesium 2 3, CBC stable, INR 1 79, proBNP 1080, troponin 11, chest x-ray no active disease, troponin 10  Venous duplex 4/29/22:  No DVT  ROS:  She has had no recurrence of the chest pounding  She has significant right lower extremity edema ever since her knee replacement in March  She has some swelling on the left side but not as pronounced  Admits to LU  She lives in a house with steps  Occasional palpitations  EKG atrial fibrillation, 97 beats per minute with frequent ventricular paced beats  Pacer interrogation 5/18/22:  AFib with multiple VHR episodes, VHR  /104  Wt Readings from Last 3 Encounters:   05/23/22 103 kg (226 lb)   05/17/22 104 kg (230 lb)   05/12/22 104 kg (230 lb)   Assessment:  Volume overload  Plan:  Increase Lasix to 40 b i d  X1 week, then decrease to daily  Follow-up labs 2 weeks-          I have spent 45 minutes with Patient and family today in which greater than 50% of this time was spent in counseling/coordination of care regarding Intructions for management, Patient and family education, Importance of tx compliance and Risk factor reductions  Assessment  Diagnoses and all orders for this visit:    Chronic atrial fibrillation ([address] )  -     Echo complete w/ contrast if indicated; Future  -     POCT ECG    Essential hypertension  -     Echo complete w/ contrast if indicated; Future  -     POCT ECG    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HCC)    Chronic kidney disease (CKD), stage II (mild)    Severe obesity (BMI 35 0-39  9) with comorbidity ([address] )    Other chest pain  -     Echo complete w/ contrast if indicated; Future  -     POCT ECG    Pacemaker    Chronic anticoagulation    Atrial fibrillation, unspecified type (HCC)  -     furosemide (LASIX) 40 mg tablet;  Take one tablet twice a day x 1 week, then decrease to one tablet daily    LU (dyspnea on exertion)  -     furosemide (LASIX) 40 mg tablet; Take one tablet twice a day x 1 week, then decrease to one tablet daily  -     Basic metabolic panel; Future  -     NT-BNP PRO; Future    Stage 3a chronic kidney disease (HCC)    Other hypervolemia          Past Medical History:   Diagnosis Date    Arthritis     Atrial fibrillation ([facility] )     Carrero's esophagus     last assessed: 1/23/2018    BRCA1 negative     BRCA2 negative     Breast cancer ([facility] ) 2006    stage 1 (left), given adjuvant radiation with Arimidex x 5 years    Cancer [facility]) 2006    Left Breast, Lumpectomy    Cataract     last assessed: 3/11/2014    Dysplasia of toenail     last assessed: 8/29/2017    Esophageal reflux     GERD (gastroesophageal reflux disease)     Gross hematuria     last assessed: 2/19/2015    Hematuria     Hiatal hernia     History of radiation therapy     Hypertension     Irregular heart beat     AFIB    Mixed sensory-motor polyneuropathy     Neuropathy     Obesity     Pacemaker     Paroxysmal atrial fibrillation (HCC)     Peripheral neuropathy     Rectal bleeding     Restless leg syndrome     Shortness of breath     last assessed: 1/11/2016       Review of Systems   Constitutional: Positive for malaise/fatigue  Negative for chills  Cardiovascular: Positive for dyspnea on exertion and leg swelling  Negative for chest pain, claudication, cyanosis, irregular heartbeat, near-syncope, orthopnea, palpitations, paroxysmal nocturnal dyspnea and syncope  Respiratory: Positive for shortness of breath  Negative for cough  Musculoskeletal: Positive for arthritis  Gastrointestinal: Negative for heartburn and nausea  Neurological: Negative for dizziness, focal weakness, headaches, light-headedness and weakness  All other systems reviewed and are negative        Allergies   Allergen Reactions    Latex      Added based on information entered during case entry, please review and add reactions, type, and severity as needed    Cephalexin Rash    Duloxetine Hcl Other (See Comments)     Facial pins and needles sensation    Erythromycin Rash    Levofloxacin Other (See Comments)     Muscular aches    Penicillins Rash    Savella [Milnacipran] Rash    Sulfa Antibiotics Rash           Current Outpatient Medications:     acetaminophen (TYLENOL) 325 mg tablet, Take 3 tablets (975 mg total) by mouth every 8 (eight) hours, Disp: , Rfl: 0    Calcium Acetate, Phos Binder, (CALCIUM ACETATE PO), Take by mouth daily  , Disp: , Rfl:     clobetasol (TEMOVATE) 0 05 % ointment, Apply once weekly to the affected area, Disp: 30 g, Rfl: 3    docusate sodium (COLACE) 100 mg capsule, Take 1 capsule (100 mg total) by mouth 2 (two) times a day, Disp: 60 capsule, Rfl: 0    fluticasone (FLONASE) 50 mcg/act nasal spray, SPRAY 1 SPRAY INTO EACH NOSTRIL EVERY DAY, Disp: 16 mL, Rfl: 1    furosemide (LASIX) 40 mg tablet, Take one tablet twice a day x 1 week, then decrease to one tablet daily, Disp: 90 tablet, Rfl: 3    gabapentin (NEURONTIN) 800 mg tablet, Take 1 tablet (800 mg total) by mouth 4 (four) times a day, Disp: 360 tablet, Rfl: 1    loratadine (CLARITIN) 10 mg tablet, Take 10 mg by mouth daily, Disp: , Rfl:     magnesium 30 MG tablet, Take 30 mg by mouth 2 (two) times a day, Disp: , Rfl:     metoprolol tartrate (LOPRESSOR) 50 mg tablet, Take 1 5 tablets (75 mg total) by mouth 2 (two) times a day, Disp: 180 tablet, Rfl: 3    multivitamin (THERAGRAN) TABS, Take 1 tablet by mouth daily, Disp: , Rfl:     oxyCODONE (Roxicodone) 5 immediate release tablet, Take 1/2 to 1 tablets by mouth every 4-6 hours as needed for pain, Disp: 30 tablet, Rfl: 0    warfarin (COUMADIN) 7 5 mg tablet, TAKE 1 TABLET BY MOUTH EVERY DAY, Disp: 90 tablet, Rfl: 3    docusate sodium (COLACE) 100 mg capsule, Take 1 capsule (100 mg total) by mouth 2 (two) times a day as needed for constipation for up to 5 days, Disp: 10 capsule, Rfl: 0    famotidine (PEPCID) 40 MG tablet, TAKE 1 TABLET BY MOUTH EVERY DAY (Patient not taking: No sig reported), Disp: 90 tablet, Rfl: 1    Flovent  MCG/ACT inhaler, INHALE 2 PUFFS 2 (TWO) TIMES A DAY RINSE MOUTH AFTER USE  (Patient not taking: Reported on 2022), Disp: 12 g, Rfl: 1    pantoprazole (PROTONIX) 20 mg tablet, Take 1 tablet (20 mg total) by mouth daily (Patient not taking: No sig reported), Disp: 20 tablet, Rfl: 0    polyethylene glycol (MIRALAX) 17 g packet, Take 17 g by mouth daily for 5 days, Disp: 85 g, Rfl: 0    pramipexole (MIRAPEX) 0 5 mg tablet, 2 FULL TABLETS TWICE A DAY AT 5:00 P  M  AND 10:00 P M  (Patient not taking: Reported on 2022), Disp: 360 tablet, Rfl: 1        Social History     Socioeconomic History    Marital status: /Civil Union     Spouse name: Not on file    Number of children: Not on file    Years of education: Not on file    Highest education level: Not on file   Occupational History    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      Comment: retired   Tobacco Use    Smoking status: Former Smoker     Packs/day: 1 00     Years: 25 00     Pack years: 25 00     Types: Cigarettes     Quit date: 1980     Years since quittin 6    Smokeless tobacco: Never Used    Tobacco comment: Quit over 30 years ago; quit age 41   Vaping Use    Vaping Use: Never used   Substance and Sexual Activity    Alcohol use: Not Currently    Drug use: No    Sexual activity: Not on file   Other Topics Concern    Not on file   Social History Narrative         Social Determinants of Health     Financial Resource Strain: Not on file   Food Insecurity: No Food Insecurity    Worried About [address] [employer] in the Last Year: Never true    [address] [employer] in the Last Year: Never true   Transportation Needs: No Transportation Needs    Lack of Transportation (Medical): No    Lack of Transportation (Non-Medical):  No Physical Activity: Not on file   Stress: Not on file   Social Connections: Not on file   Intimate Partner Violence: Not on file   Housing Stability: Low Risk     Unable to Pay for Housing in the Last Year: No    Number of Places Lived in the Last Year: 1    Unstable Housing in the Last Year: No       Family History   Problem Relation Age of Onset    Hypertension Mother     Heart disease Father     Aneurysm Father     Coronary artery disease Father         in his 76s with aneurysm    Aortic aneurysm Father         abdominal    Scleroderma Sister     Breast cancer Sister 76    Hypertension Sister     Cancer Sister     No Known Problems Son     No Known Problems Son     Testicular cancer Son 39    Thyroid cancer Son 45    Colon cancer Maternal Aunt     Colon cancer Maternal Aunt     Breast cancer Other 48        kaylee's daughter    Alcohol abuse Neg Hx     Substance Abuse Neg Hx     Mental illness Neg Hx     Depression Neg Hx        Physical Exam  Vitals and nursing note reviewed  Constitutional:       General: She is not in acute distress  Appearance: She is obese  She is not diaphoretic  HENT:      Head: Normocephalic and atraumatic  Eyes:      Conjunctiva/sclera: Conjunctivae normal    Cardiovascular:      Rate and Rhythm: Normal rate and regular rhythm  Pulses: Intact distal pulses  Heart sounds: Murmur heard  Harsh midsystolic murmur is present with a grade of 3/6 at the upper right sternal border radiating to the neck  Pulmonary:      Effort: Pulmonary effort is normal       Breath sounds: Examination of the right-lower field reveals rales  Examination of the left-lower field reveals rales  Rales present  Abdominal:      General: Bowel sounds are normal       Palpations: Abdomen is soft  Musculoskeletal:         General: Normal range of motion  Cervical back: Normal range of motion and neck supple  Right lower leg: Edema present        Left lower leg: Edema present  Comments: Right lower extremity greater than left lower extremity edema  She uses a cane p r n  Skin:     General: Skin is warm and dry  Neurological:      Mental Status: She is alert and oriented to person, place, and time  Vitals: Blood pressure (!) 134/104, pulse 79, height 5' 4 5" (1 638 m), weight 103 kg (226 lb), SpO2 94 %  Wt Readings from Last 3 Encounters:   05/23/22 103 kg (226 lb)   05/17/22 104 kg (230 lb)   05/12/22 104 kg (230 lb)         Labs & Results:  Lab Results   Component Value Date    WBC 7 74 04/17/2022    HGB 11 4 (L) 04/17/2022    HCT 36 2 04/17/2022    MCV 96 04/17/2022     04/17/2022     No results found for: BNP  No components found for: CHEM  Troponin I   Date Value Ref Range Status   06/06/2019 <0 02 <=0 04 ng/mL Final     Comment:     3Autovalidation override  [employer] Chemistry analyzer 99% cutoff is > 0 04 ng/mL in network labs     o cTnI 99% cutoff is useful only when applied to patients in the clinical setting of myocardial ischemia   o cTnI 99% cutoff should be interpreted in the context of clinical history, ECG findings and possibly cardiac imaging to establish correct diagnosis  o cTnI 99% cutoff may be suggestive but clearly not indicative of a coronary event without the clinical setting of myocardial ischemia  06/06/2019 <0 02 <=0 04 ng/mL Final     Comment:     3Autovalidation override  [employer]ens Chemistry analyzer 99% cutoff is > 0 04 ng/mL in network labs     o cTnI 99% cutoff is useful only when applied to patients in the clinical setting of myocardial ischemia   o cTnI 99% cutoff should be interpreted in the context of clinical history, ECG findings and possibly cardiac imaging to establish correct diagnosis  o cTnI 99% cutoff may be suggestive but clearly not indicative of a coronary event without the clinical setting of myocardial ischemia       11/29/2018 <0 02 <=0 04 ng/mL Final     Comment:     3Autovalidation override  Siemens Chemistry analyzer 99% cutoff is > 0 04 ng/mL in network labs     o cTnI 99% cutoff is useful only when applied to patients in the clinical setting of myocardial ischemia   o cTnI 99% cutoff should be interpreted in the context of clinical history, ECG findings and possibly cardiac imaging to establish correct diagnosis  o cTnI 99% cutoff may be suggestive but clearly not indicative of a coronary event without the clinical setting of myocardial ischemia  Results for orders placed during the hospital encounter of 21    Echo complete with contrast if indicated    [address]  [address] [address]  [phone]    Transthoracic Echocardiogram  2D, M-mode, Doppler, and Color Doppler    Study date:  04-May-2021    Patient: Metta Cushing  MR number: [id number]  Account number: [de-identified]  : 1940  Age: 68 years  Gender: Female  Status: Outpatient  Location: Echo lab  Height: 64 5 in  Weight: 223 5 lb  BP: 132/ 80 mmHg    Indications: A Fib  Diagnoses: I48 1 - Atrial flutter    Sonographer:  MORRIS Nugent  Primary Physician:  Ben Adams MD  Referring Physician:  Jessica Page MD  Group:  [place] [id number] [facility]  Interpreting Physician:  Alma Smith MD    SUMMARY    LEFT VENTRICLE:  Normal left ventricular chamber dimension  There is mild concentric left ventricular  Left ventricular ejection fraction is about 55%  No definite regional wall motion abnormality seen, although it cannot be completely excluded from this study    Indeterminate diastolic function because of underlying mitral annular calcification    RIGHT VENTRICLE:  RV size is at upper limit of normal  Borderline RV systolic function  Device lead visualized in RV cavity    LEFT ATRIUM:  Moderately dilated left atrium    RIGHT ATRIUM:  Markedly dilated right atrium  Device lead visualized in right atrium    MITRAL VALVE:  Mildly thickened mitral valve leaflets with moderate annular calcification  There is mild to moderate mitral regurgitation  There was no evidence of mitral stenosis    AORTIC VALVE:  Aortic valve is probably trileaflet  Leaflets show thickening with mild calcification and reduced cuspal separation  There is trace aortic regurgitation  There is mild to moderate aortic stenosis  Mean/peak gradient is 16/32 mm Hg, GABRIEL is 1 1 cm2, DVI is 0 33    TRICUSPID VALVE:  Moderate to severe tricuspid regurgitation  There is moderate pulmonary hypertension with RV systolic pressure of 97-37 mm Hg    PULMONIC VALVE:  There is mild pulmonary regurgitation    COMPARISONS:  Compared to previous echo from 11/28/2018, valvular heart disease has increased along with PA pressure    HISTORY: PRIOR HISTORY: Breast cancer,Carrero's esophagus,gastroesophageal reflux disease,pacemaker,diabetes,HTN  PROCEDURE: The procedure was performed in the echo lab  This was a routine study  The transthoracic approach was used  The study included complete 2D imaging, M-mode, complete spectral Doppler, and color Doppler  The heart rate was 88 bpm,  at the start of the study  Images were obtained from the parasternal, apical, subcostal, and suprasternal notch acoustic windows  Image quality was adequate  LEFT VENTRICLE: Normal left ventricular chamber dimension  There is mild concentric left ventricular  Left ventricular ejection fraction is about 55%  No definite regional wall motion abnormality seen, although it cannot be completely excluded from this study    Indeterminate diastolic function because of underlying mitral annular calcification    RIGHT VENTRICLE: RV size is at upper limit of normal  Borderline RV systolic function  Device lead visualized in RV cavity    LEFT ATRIUM: Moderately dilated left atrium    RIGHT ATRIUM: Markedly dilated right atrium  Device lead visualized in right atrium    MITRAL VALVE: Mildly thickened mitral valve leaflets with moderate annular calcification  There is mild to moderate mitral regurgitation  There was no evidence of mitral stenosis    AORTIC VALVE: Aortic valve is probably trileaflet  Leaflets show thickening with mild calcification and reduced cuspal separation  There is trace aortic regurgitation  There is mild to moderate aortic stenosis  Mean/peak gradient is 16/32 mm Hg, GABRIEL is 1 1 cm2, DVI is 0 33    TRICUSPID VALVE: Moderate to severe tricuspid regurgitation  There is moderate pulmonary hypertension with RV systolic pressure of 21-06 mm Hg    PULMONIC VALVE: There is mild pulmonary regurgitation    AORTA: Normal aortic root diameter    SYSTEM MEASUREMENT TABLES    2D  EF (Teich): 40 48 %  %FS: 19 79 %  Ao Diam: 3 13 cm  EDV(Teich): 114 46 ml  ESV(Teich): 68 13 ml  IVSd: 1 16 cm  LA Area: 29 65 cm2  LA Diam: 4 68 cm  LVIDd: 4 93 cm  LVIDs: 3 95 cm  LVOT Diam: 1 89 cm  LVPWd: 1 11 cm  RA Area: 28 31 cm2  RVIDd: 3 84 cm  SV (Teich): 46 33 ml    CW  AV Env  Ti: 342 53 ms  AV VTI: 60 06 cm  AV Vmax: 2 84 m/s  AV Vmean: 1 75 m/s  AV maxP 25 mmHg  AV meanP 09 mmHg  TR Vmax: 3 73 m/s  TR maxP 66 mmHg    MM  TAPSE: 1 19 cm    PW  MV E/A Ratio: 3 63  E' Sept: 0 08 m/s  E/E' Sept: 16 34  LVOT Env  Ti: 296 86 ms  LVOT VTI: 19 53 cm  LVOT Vmax: 1 07 m/s  LVOT Vmean: 0 66 m/s  LVOT maxP 59 mmHg  LVOT meanP 17 mmHg  MV A Mynor: 0 35 m/s  MV Dec Wilkinson: 7 43 m/s2  MV DecT: 171 81 ms  MV E Mynor: 1 28 m/s  MV PHT: 49 82 ms  MVA By PHT: 4 42 cm2    [address] Accredited Echocardiography Laboratory    Prepared and electronically signed by    Usha Jackson MD  Signed 04-May-2021 14:07:15    No results found for this or any previous visit  This note was completed in part utilizing [employer] direct voice recognition software     Grammatical errors, random word insertion, spelling mistakes, and incomplete sentences may be an occasional consequence of the system secondary to software limitations, ambient noise and hardware issues  At the time of dictation, efforts were made to edit, clarify and /or correct errors  Please read the chart carefully and recognize, using context, where substitutions have occurred    If you have any questions or concerns about the context, text or information contained within the body of this dictation, please contact myself, the provider, for further clarification

## 2022-05-24 ENCOUNTER — PATIENT OUTREACH (OUTPATIENT)
Dept: INTERNAL MEDICINE CLINIC | Facility: CLINIC | Age: 82
End: 2022-05-24

## 2022-05-24 NOTE — PROGRESS NOTES
Called the patient  She reports continued slow healing of her right knee, but it is improving  No fever, aches, or chills  Patient saw cardiology yesterday, but has not started the increased dose of Lasix due to her schedule  Patient was educated on fluid overload & the importance of not waiting  She states understanding, but will not start the increased dose until Saturday  Patient was educated on daily weights & a low salt diet  In basket message sent to cardiology  Patient is aware she will need to go for lab work

## 2022-05-25 ENCOUNTER — OFFICE VISIT (OUTPATIENT)
Dept: OBGYN CLINIC | Facility: CLINIC | Age: 82
End: 2022-05-25

## 2022-05-25 VITALS
RESPIRATION RATE: 19 BRPM | HEART RATE: 75 BPM | DIASTOLIC BLOOD PRESSURE: 60 MMHG | HEIGHT: 64 IN | SYSTOLIC BLOOD PRESSURE: 125 MMHG | BODY MASS INDEX: 38.65 KG/M2 | WEIGHT: 226.4 LBS | TEMPERATURE: 98 F

## 2022-05-25 DIAGNOSIS — T81.89XD DELAYED SURGICAL WOUND HEALING, SUBSEQUENT ENCOUNTER: ICD-10-CM

## 2022-05-25 DIAGNOSIS — Z96.651 AFTERCARE FOLLOWING RIGHT KNEE JOINT REPLACEMENT SURGERY: ICD-10-CM

## 2022-05-25 DIAGNOSIS — Z96.651 STATUS POST TOTAL RIGHT KNEE REPLACEMENT USING CEMENT: Primary | ICD-10-CM

## 2022-05-25 DIAGNOSIS — Z47.1 AFTERCARE FOLLOWING RIGHT KNEE JOINT REPLACEMENT SURGERY: ICD-10-CM

## 2022-05-25 PROCEDURE — 99024 POSTOP FOLLOW-UP VISIT: CPT | Performed by: ORTHOPAEDIC SURGERY

## 2022-05-25 NOTE — PROGRESS NOTES
Assessment/Plan:  1  Status post total right knee replacement using cement     2  Aftercare following right knee joint replacement surgery     3  Delayed surgical wound healing, subsequent encounter       Scribe Attestation    I,:  Ketty Chapa am acting as a scribe while in the presence of the attending physician :       I,:  Charmayne Must, DO personally performed the services described in this documentation    as scribed in my presence :         Julia Florentino is a pleasant 47-year-old female who returns today for follow-up evaluation and wound check 8 weeks status post right total knee arthroplasty  I am pleased with the improved appearance of her wound and right lower extremity  I encouraged her to continue focusing on her overall health and her underlying cardiac issues  I do believe her anterior knee pain is related to her deconditioning  I explained that we can address this once her overall health is more optimized  All of her questions and concerns were addressed today  We will see her back in 4 weeks for her three-month postoperative appointment with right knee x-ray on arrival     Subjective: Follow-up evaluation 8 weeks status post right total knee arthroplasty    Patient ID: Carl Ahmadi is a 80 y o  female who returns today for follow-up evaluation and wound check 8 weeks status post right total knee arthroplasty  At today's visit, she reports that the redness and swelling in her right lower extremity improved somewhat since with a saw her  She is now taking Lasix  She does continue to experience some soreness about the anterior aspect both of her knees  She mostly notices this when rising from a seated position  She denies any new injury trauma  Review of Systems   Constitutional: Positive for activity change  Negative for chills, fever and unexpected weight change  HENT: Negative for hearing loss, nosebleeds and sore throat      Eyes: Negative for pain, redness and visual disturbance  Respiratory: Negative for cough, shortness of breath and wheezing  Cardiovascular: Negative for chest pain, palpitations and leg swelling  Gastrointestinal: Negative for abdominal pain, nausea and vomiting  Endocrine: Negative for polydipsia and polyuria  Genitourinary: Negative for dysuria and hematuria  Musculoskeletal: Positive for arthralgias and myalgias  Negative for joint swelling  See HPI   Skin: Negative for rash and wound  Neurological: Negative for dizziness, numbness and headaches  Psychiatric/Behavioral: Negative for decreased concentration and suicidal ideas  The patient is not nervous/anxious            Past Medical History:   Diagnosis Date    Arthritis     Atrial fibrillation ([facility] [address] )     Carrero's esophagus     last assessed: 1/23/2018    BRCA1 negative     BRCA2 negative     [facility] 2006    stage 1 (left), given adjuvant radiation with Arimidex x 5 years    Cancer [facility]) 2006    Left Breast, Lumpectomy    Cataract     last assessed: 3/11/2014    Dysplasia of toenail     last assessed: 8/29/2017    Esophageal reflux     GERD (gastroesophageal reflux disease)     Gross hematuria     last assessed: 2/19/2015    Hematuria     Hiatal hernia     History of radiation therapy     Hypertension     Irregular heart beat     AFIB    Mixed sensory-motor polyneuropathy     Neuropathy     Obesity     Pacemaker     Paroxysmal atrial fibrillation (HCC)     Peripheral neuropathy     Rectal bleeding     Restless leg syndrome     Shortness of breath     last assessed: 1/11/2016       Past Surgical History:   Procedure Laterality Date    BREAST BIOPSY Left 2006    BREAST LUMPECTOMY Left 2006    onset: 2006    BREAST SURGERY      CARDIAC PACEMAKER PLACEMENT      x 3 2006    CATARACT EXTRACTION      COLONOSCOPY      CYSTOSCOPY  04/04/2014    diagnostic    HYSTERECTOMY      ALISON BSO; due to fibroid uterus; age 38   [address] excision lesion of meniscus or capsule knee    KNEE SURGERY      OOPHORECTOMY Bilateral     OTHER SURGICAL HISTORY      radiation therapy    [place] COLONOSCOPY FLX DX W/COLLJ SPEC WHEN PFRMD N/A 2017    Procedure: COLONOSCOPY;  Surgeon: Allison Olsen MD;  [facility]; Service: Gastroenterology    NJ ESOPHAGOGASTRODUODENOSCOPY TRANSORAL DIAGNOSTIC N/A 2017    Procedure: ESOPHAGOGASTRODUODENOSCOPY (EGD); Surgeon: Nita Haider MD;  [facility];   Service: Gastroenterology    NJ TOTAL KNEE ARTHROPLASTY Left 2020    Procedure: ARTHROPLASTY KNEE TOTAL;  Surgeon: Marcelina Read DO;  Location: [address] [place] [address];  Service: Orthopedics    NJ TOTAL KNEE ARTHROPLASTY Right 3/28/2022    Procedure: ARTHROPLASTY KNEE TOTAL W ROBOT - RIGHT;  Surgeon: Marcelina Read DO;  Location: [address] [place] [address];  Service: Orthopedics    UPPER GASTROINTESTINAL ENDOSCOPY         Family History   Problem Relation Age of Onset    Hypertension Mother     Heart disease Father     Aneurysm Father     Coronary artery disease Father         in his 76s with aneurysm    Aortic aneurysm Father         abdominal    Scleroderma Sister     Breast cancer Sister 76    Hypertension Sister     Cancer Sister     No Known Problems Son     No Known Problems Son     Testicular cancer Son 39    Thyroid cancer Son 45    Colon cancer Maternal Aunt     Colon cancer Maternal Aunt     Breast cancer Other 48        kaylee's daughter    Alcohol abuse Neg Hx     Substance Abuse Neg Hx     Mental illness Neg Hx     Depression Neg Hx        Social History     Occupational History    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      Comment: retired   Tobacco Use    Smoking status: Former Smoker     Packs/day: 1 00     Years: 25 00     Pack years: 25 00     Types: Cigarettes     Quit date: 1980     Years since quittin 7    Smokeless tobacco: Never Used    Tobacco comment: Quit over 30 years ago; quit age 46   Vaping Use    Vaping Use: Never used   Substance and Sexual Activity    Alcohol use: Not Currently    Drug use: No    Sexual activity: Not on file         Current Outpatient Medications:     acetaminophen (TYLENOL) 325 mg tablet, Take 3 tablets (975 mg total) by mouth every 8 (eight) hours, Disp: , Rfl: 0    Calcium Acetate, Phos Binder, (CALCIUM ACETATE PO), Take by mouth daily  , Disp: , Rfl:     clobetasol (TEMOVATE) 0 05 % ointment, Apply once weekly to the affected area, Disp: 30 g, Rfl: 3    docusate sodium (COLACE) 100 mg capsule, Take 1 capsule (100 mg total) by mouth 2 (two) times a day, Disp: 60 capsule, Rfl: 0    famotidine (PEPCID) 40 MG tablet, TAKE 1 TABLET BY MOUTH EVERY DAY, Disp: 90 tablet, Rfl: 1    fluticasone (FLONASE) 50 mcg/act nasal spray, SPRAY 1 SPRAY INTO EACH NOSTRIL EVERY DAY, Disp: 16 mL, Rfl: 1    furosemide (LASIX) 40 mg tablet, Take one tablet twice a day x 1 week, then decrease to one tablet daily, Disp: 90 tablet, Rfl: 3    gabapentin (NEURONTIN) 800 mg tablet, Take 1 tablet (800 mg total) by mouth 4 (four) times a day, Disp: 360 tablet, Rfl: 1    loratadine (CLARITIN) 10 mg tablet, Take 10 mg by mouth daily, Disp: , Rfl:     magnesium 30 MG tablet, Take 30 mg by mouth 2 (two) times a day, Disp: , Rfl:     metoprolol tartrate (LOPRESSOR) 50 mg tablet, Take 1 5 tablets (75 mg total) by mouth 2 (two) times a day, Disp: 180 tablet, Rfl: 3    multivitamin (THERAGRAN) TABS, Take 1 tablet by mouth daily, Disp: , Rfl:     oxyCODONE (Roxicodone) 5 immediate release tablet, Take 1/2 to 1 tablets by mouth every 4-6 hours as needed for pain, Disp: 30 tablet, Rfl: 0    pantoprazole (PROTONIX) 20 mg tablet, Take 1 tablet (20 mg total) by mouth daily, Disp: 20 tablet, Rfl: 0    pramipexole (MIRAPEX) 0 5 mg tablet, 2 FULL TABLETS TWICE A DAY AT 5:00 P  M   AND 10:00 P M , Disp: 360 tablet, Rfl: 1    warfarin (COUMADIN) 7 5 mg tablet, TAKE 1 TABLET BY MOUTH EVERY DAY, Disp: 90 tablet, Rfl: 3    docusate sodium (COLACE) 100 mg capsule, Take 1 capsule (100 mg total) by mouth 2 (two) times a day as needed for constipation for up to 5 days, Disp: 10 capsule, Rfl: 0    Flovent  MCG/ACT inhaler, INHALE 2 PUFFS 2 (TWO) TIMES A DAY RINSE MOUTH AFTER USE  (Patient not taking: Reported on 5/25/2022), Disp: 12 g, Rfl: 1    polyethylene glycol (MIRALAX) 17 g packet, Take 17 g by mouth daily for 5 days, Disp: 85 g, Rfl: 0    Allergies   Allergen Reactions    Latex      Added based on information entered during case entry, please review and add reactions, type, and severity as needed    Cephalexin Rash    Duloxetine Hcl Other (See Comments)     Facial pins and needles sensation    Erythromycin Rash    Levofloxacin Other (See Comments)     Muscular aches    Penicillins Rash    Savella [Milnacipran] Rash    Sulfa Antibiotics Rash       Objective:  Vitals:    05/25/22 0926   BP: 125/60   Pulse: 75   Resp: 19   Temp: 98 °F (36 7 °C)       Body mass index is 38 8 kg/m²  Right Knee Exam     Muscle Strength   The patient has normal right knee strength  Tenderness   Right knee tenderness location: mild tenderness distal incision, posterior lower leg, and anteromedial lower leg  Range of Motion   Extension: 0   Flexion: 110     Tests   Varus: negative Valgus: negative  Lachman:  Anterior - negative    Posterior - negative    Other   Erythema: present (Foot to distal thigh)  Scars: present  Sensation: normal  Pulse: present  Swelling: mild  Effusion: no effusion present    Comments:  Edema to right lower leg  Improved appearance to distal aspect operative incision  There is a persistent area of necrosis distally but there is a fibrous wound bed and no concern for infection  Observations     Right Knee   Negative for effusion  Physical Exam  Vitals and nursing note reviewed  Constitutional:       Appearance: She is well-developed     HENT:      Head: Normocephalic and atraumatic  Eyes:      General: No scleral icterus  Conjunctiva/sclera: Conjunctivae normal    Cardiovascular:      Rate and Rhythm: Normal rate  Pulmonary:      Effort: Pulmonary effort is normal  No respiratory distress  Musculoskeletal:      Cervical back: Normal range of motion and neck supple  Right knee: No effusion  Comments: As noted in HPI   Skin:     General: Skin is warm and dry  Neurological:      Mental Status: She is alert and oriented to person, place, and time     Psychiatric:         Behavior: Behavior normal

## 2022-05-26 ENCOUNTER — APPOINTMENT (OUTPATIENT)
Dept: PHYSICAL THERAPY | Facility: CLINIC | Age: 82
End: 2022-05-26
Payer: MEDICARE

## 2022-05-31 ENCOUNTER — PATIENT OUTREACH (OUTPATIENT)
Dept: INTERNAL MEDICINE CLINIC | Facility: CLINIC | Age: 82
End: 2022-05-31

## 2022-05-31 NOTE — PROGRESS NOTES
Followed up with the patient  She states she did start the increased dose of Lasix & it is helping her edema go down & she is feeling better overall  She stopped daily weights, but reports she did lose 2 5 lbs  She was encouraged to continue daily weights  She will complete the week of Lasix 40 mg BID & then go for repeat lab work  No new questions, concerns, or needs at this time

## 2022-06-02 ENCOUNTER — OFFICE VISIT (OUTPATIENT)
Dept: PHYSICAL THERAPY | Facility: CLINIC | Age: 82
End: 2022-06-02
Payer: MEDICARE

## 2022-06-02 DIAGNOSIS — M17.11 PRIMARY OSTEOARTHRITIS OF RIGHT KNEE: Primary | ICD-10-CM

## 2022-06-02 DIAGNOSIS — G89.29 CHRONIC PAIN OF RIGHT KNEE: ICD-10-CM

## 2022-06-02 DIAGNOSIS — M25.561 CHRONIC PAIN OF RIGHT KNEE: ICD-10-CM

## 2022-06-02 PROCEDURE — 97112 NEUROMUSCULAR REEDUCATION: CPT

## 2022-06-02 PROCEDURE — 97110 THERAPEUTIC EXERCISES: CPT

## 2022-06-02 NOTE — PROGRESS NOTES
Daily Note     Today's date: 2022  Patient name: Chago Monroe  : 1940  MRN: [id number]  Referring provider: Shea Golden DO  Dx:   Encounter Diagnosis     ICD-10-CM    1  Primary osteoarthritis of right knee  M17 11    2  Chronic pain of right knee  M25 561     G89 29                   Subjective: Pt reports no new complaints, feels a bit better overall  Was not as tired after last session  Objective: sit<>stand from airex w/o UE support can be performed for 10 reps w/ fair glute drive, quick fatigue  Assessment: Tolerated treatment well  Patient demonstrated fatigue post treatment and would benefit from continued PT  Does well w/ exercise progressions, shows greater tolerance to standing work w/ less adverse effects  Educated on slowly progressing distance walked and cardiovascular challenge over weekend, not to point of significant pain, fatigue, or unsteadiness  Good understanding  Progress WB as able  Plan: Continue per plan of care   stair and squat progressions, unsupported gait for distance - 6 MWT      Eval/ Re-eval Auth #/ Referral # Total visits Start date  Expiration date Total active units  Total manual units  PT only or  PT+OT?   3-21-22 (pre-op) NA NA NA NA NA NA NA   3-31-22 (post-op)                                              Precautions: B diabetic neuropathy, DM II, L TKA 2020    Past Medical History:   Diagnosis Date    At[facility] fibrillation ([facility] )     Carrero's esophagus     last assessed: 2018    BRCA1 negative     BRCA2 negative     Breast cancer ([facility] [address] ) 2006    stage 1 (left), given adjuvant radiation with Arimidex x 5 years    Cancer [facility])     Left Breast, Lumpectomy    Cataract     last assessed: 3/11/2014    Dysplasia of toenail     last assessed: 2017    Esophageal reflux     GERD (gastroesophageal reflux disease)     Gross hematuria     last assessed: 2015    Hematuria     Hiatal hernia     History of radiation therapy  Hypertension     Irregular heart beat     AFIB    Mixed sensory-motor polyneuropathy     Neuropathy     Obesity     Pacemaker     Paroxysmal atrial fibrillation (HCC)     Peripheral neuropathy     Rectal bleeding     Restless leg syndrome     Shortness of breath     last assessed: 1/11/2016         Date 5/23/22 6/2/22 5/12/22 5/16/22 (PN) 5/19    Visit Number 13 14   10  11 12   AROM         Knee Flexion NT NT  97  112 112   Knee Extension   0  0  0            Manual         Patellar mobs         STM                           TherEx         Knee/Hip PROM x5-6 mins  x5 mins total    performed knee flexion and ext x4 mins total same x4 min   Active w/u         Quad set, glute set x20 x20-30 total 5 sec x 20 each  5 sec x 10  Each  x20 total  X 20 total   Ankle pumps         SLR LAQ x20 2x10 total  10 x 2 AAROM  AAROM 10 x  (pt deferred further reps) Active x 20 total  SAQ x 20    SLR x10   Hip abduction         Knee ext: SAQ, LAQ SAQ x 20  LAQ x 30  LAQ: 5 sec x 10   LAQ: 5 sec x 10   x20    Heel slides Heel raises x 20-30 total  -- HR: 30x  rev Calf stretch in standing over half foam roll x 3 mins      20x at bar  -- Squats at bar 2x10      R side knee PROM w/ HS stretching x 6-7 mins   R side knee PROM w/ HS stretching x 6-7 mins Performed  HS and glute/piri stretching x5 mins  HS and piri stretching x 3-4 mins     3x10 bridging  Reg bridge w/ ad sq x25 total  3x10 w/ ad sq bridging -- Bridging x 20 total          Squat at bar x20     4" ant stair taps x 20     Step ups 6" x10-12 B    Leg press 65-75# 4x10 total     NuStep x6 mins lvl 2-3  Mid session 10 min lvl 3  lv 1 5 min  Pre 10 min lvl 2-3     TherAct         Patient education x4 mins for POC and HEP rev    Rev x2-3 mins Rev x5 mins     Stair negotiation         Car transfer   Standing knee flexion AROM 2 x 10   2 x 10   Progress note measurements x 5 mins       Sit<>stand from airex x10-15 total    exag gait x 3 laps at bar  exag gait x 2-3 laps      6" ant step up x20, 4" ant tap down x 10, 4" lat step ups x20 6" ant step up x20, 4" ant tap down x 20, 4" lat step ups x20 -- 6" step up/down x 20 total    Gait Training            side step at bar x 3-4 mins                         Modalities         CP Home  5 min   10 min post

## 2022-06-03 ENCOUNTER — TELEPHONE (OUTPATIENT)
Dept: GYNECOLOGIC ONCOLOGY | Facility: CLINIC | Age: 82
End: 2022-06-03

## 2022-06-03 ENCOUNTER — PATIENT OUTREACH (OUTPATIENT)
Dept: INTERNAL MEDICINE CLINIC | Facility: CLINIC | Age: 82
End: 2022-06-03

## 2022-06-03 NOTE — TELEPHONE ENCOUNTER
Patient called in regarding the appt that she had but it was canceled for unknown reasons  the appt was for 6/3/22 (today), I tried to reschedule the appt but it would not let me stating that it had been reschedule but there was nothing on her chart indicating that she even has an appt    It was 1 year follow up w/ marybeth  Please advise  I can also call and have it rescheduled

## 2022-06-06 ENCOUNTER — OFFICE VISIT (OUTPATIENT)
Dept: PHYSICAL THERAPY | Facility: CLINIC | Age: 82
End: 2022-06-06
Payer: MEDICARE

## 2022-06-06 ENCOUNTER — LAB (OUTPATIENT)
Dept: LAB | Facility: HOSPITAL | Age: 82
End: 2022-06-06
Payer: MEDICARE

## 2022-06-06 ENCOUNTER — ANTICOAG VISIT (OUTPATIENT)
Dept: CARDIOLOGY CLINIC | Facility: CLINIC | Age: 82
End: 2022-06-06

## 2022-06-06 DIAGNOSIS — M17.11 PRIMARY OSTEOARTHRITIS OF RIGHT KNEE: Primary | ICD-10-CM

## 2022-06-06 DIAGNOSIS — M25.561 CHRONIC PAIN OF RIGHT KNEE: ICD-10-CM

## 2022-06-06 DIAGNOSIS — I50.32 CHRONIC DIASTOLIC HEART FAILURE (HCC): Primary | ICD-10-CM

## 2022-06-06 DIAGNOSIS — R06.00 DOE (DYSPNEA ON EXERTION): ICD-10-CM

## 2022-06-06 DIAGNOSIS — G89.29 CHRONIC PAIN OF RIGHT KNEE: ICD-10-CM

## 2022-06-06 LAB
ANION GAP SERPL CALCULATED.3IONS-SCNC: 8 MMOL/L (ref 4–13)
BUN SERPL-MCNC: 24 MG/DL (ref 5–25)
CALCIUM SERPL-MCNC: 9.2 MG/DL (ref 8.3–10.1)
CHLORIDE SERPL-SCNC: 105 MMOL/L (ref 100–108)
CO2 SERPL-SCNC: 31 MMOL/L (ref 21–32)
CREAT SERPL-MCNC: 0.86 MG/DL (ref 0.6–1.3)
GFR SERPL CREATININE-BSD FRML MDRD: 64 ML/MIN/1.73SQ M
GLUCOSE SERPL-MCNC: 96 MG/DL (ref 65–140)
NT-PROBNP SERPL-MCNC: 905 PG/ML
POTASSIUM SERPL-SCNC: 4.5 MMOL/L (ref 3.5–5.3)
SODIUM SERPL-SCNC: 144 MMOL/L (ref 136–145)

## 2022-06-06 PROCEDURE — 97112 NEUROMUSCULAR REEDUCATION: CPT

## 2022-06-06 PROCEDURE — 97110 THERAPEUTIC EXERCISES: CPT

## 2022-06-06 PROCEDURE — 83880 ASSAY OF NATRIURETIC PEPTIDE: CPT

## 2022-06-06 PROCEDURE — 80048 BASIC METABOLIC PNL TOTAL CA: CPT

## 2022-06-06 NOTE — PROGRESS NOTES
Daily Note     Today's date: 2022  Patient name: Carl Ahmadi  : 1940  MRN: [id number]  Referring provider: Juan Pablo Millard DO  Dx:   Encounter Diagnosis     ICD-10-CM    1  Primary osteoarthritis of right knee  M17 11    2  Chronic pain of right knee  M25 561     G89 29                   Subjective: Pt reports some discouragement over overall progress  Feels that she still has lots of achy pain throughout body, but mainly in B knees  Has to take 2 Tylenol every 8 hours to keep soreness down  Feels that she may need to exercise more  Objective: requires cueing for forward WS and proper glute drive during stair training but is able to correct and maintain  Assessment: Tolerated treatment well  Patient demonstrated fatigue post treatment and would benefit from continued PT  Fatigue but no increase in pain by end  Tolerates longer total duration of standing exercises today w/ less overall pain  She will benefit from more aggressive challenges  We had long discussion today about need for more work at home in between sessions  Pt mentioned that her son owns a gym and that she has worked out there before  I strongly encouraged her to explore this possibility again, and to get me the contact info of anyone who might help work with or train her  Pt seemed motivated by this prospect  I will continue to encourage her to increase activity level in appropriate manner, she would benefit from an additional 2-3x/week strength/conditioning work combined w/ 2x/week PT sessions  Pt in agreement         Plan: Continue per plan of care  hip abd work, retro walking progressions, carries, unsupported gait in small durations      Eval/ Re-eval Auth #/ Referral # Total visits Start date  Expiration date Total active units  Total manual units  PT only or  PT+OT?   3-21-22 (pre-op) NA NA NA NA NA NA NA   3-31-22 (post-op)                                              Precautions: B diabetic neuropathy, DM II, L TKA August 2020    Past Medical History:   Diagnosis Date    At[facility] fibrillation ([facility] [address] )     Carrero's esophagus     last assessed: 1/23/2018    BRCA1 negative     BRCA2 negative     Breast cancer ([facility] [address] ) 2006    stage 1 (left), given adjuvant radiation with Arimidex x 5 years    Cancer [facility]) 2006    Left Breast, Lumpectomy    Cataract     last assessed: 3/11/2014    Dysplasia of toenail     last assessed: 8/29/2017    Esophageal reflux     GERD (gastroesophageal reflux disease)     Gross hematuria     last assessed: 2/19/2015    Hematuria     Hiatal hernia     History of radiation therapy     Hypertension     Irregular heart beat     AFIB    Mixed sensory-motor polyneuropathy     Neuropathy     Obesity     Pacemaker     Paroxysmal atrial fibrillation (HCC)     Peripheral neuropathy     Rectal bleeding     Restless leg syndrome     Shortness of breath     last assessed: 1/11/2016         Date 5/23/22 6/2/22 6/6/22 5/16/22 (PN) 5/19    Visit Number 13 14  15  11 12   AROM         Knee Flexion NT NT  NT  112 112   Knee Extension     0  0            Manual         Patellar mobs         STM                           TherEx         Knee/Hip PROM x5-6 mins  x5 mins total  rev  performed knee flexion and ext x4 mins total same x4 min   Active w/u         Quad set, glute set x20 x20-30 total  5 sec x 10  Each  x20 total  X 20 total   Ankle pumps         SLR LAQ x20 2x10 total   AAROM 10 x  (pt deferred further reps) Active x 20 total  SAQ x 20    SLR x10   Hip abduction         Knee ext: SAQ, LAQ SAQ x 20  LAQ x 30   LAQ: 5 sec x 10   x20    Heel slides Heel raises x 20-30 total  Heel/toe raises x 30 throughout  HR: 30x  rev Calf stretch in standing over half foam roll x 3 mins      Squats at bar x 20  -- Squats at bar 2x10      R side knee PROM w/ HS stretching x 6-7 mins    Performed  HS and glute/piri stretching x5 mins  HS and piri stretching x 3-4 mins     3x10 bridging  Reg bridge w/ ad sq x25 total   -- Bridging x 20 total       exag gait at bar x 5 laps, retro walking x 5 laps, exag side step x 4 laps    Squat at bar x20     4" ant stair taps x 20     Step ups 6" x10-12 B  Step ups 6" 2x10 B    Step up double taps 6" 2x10 B  Leg press 65-75# 4x10 total     NuStep x6 mins lvl 2-3  Mid session 10 min lvl 3 NU step pre lvl 4 x 7-8 mins  lv 1 5 min  Pre 10 min lvl 2-3     TherAct         Patient education x4 mins for POC and HEP rev    Rev x2-3 mins Rev x5 mins     Stair negotiation         Car transfer   Standing knee flexion AROM 2 x 10   2 x 10   Progress note measurements x 5 mins       Sit<>stand from airex x10-15 total    exag gait x 3 laps at bar  exag gait x 2-3 laps      6" ant step up x20, 4" ant tap down x 10, 4" lat step ups x20 6" ant step up x20, 4" ant tap down x 20, 4" lat step ups x20 -- 6" step up/down x 20 total    Gait Training            side step at bar x 3-4 mins                         Modalities         CP Home  5 min   10 min post

## 2022-06-07 ENCOUNTER — OFFICE VISIT (OUTPATIENT)
Dept: INTERNAL MEDICINE CLINIC | Facility: CLINIC | Age: 82
End: 2022-06-07
Payer: MEDICARE

## 2022-06-07 VITALS
HEART RATE: 98 BPM | WEIGHT: 227 LBS | DIASTOLIC BLOOD PRESSURE: 80 MMHG | HEIGHT: 64 IN | SYSTOLIC BLOOD PRESSURE: 120 MMHG | OXYGEN SATURATION: 99 % | BODY MASS INDEX: 38.76 KG/M2 | TEMPERATURE: 96.6 F

## 2022-06-07 DIAGNOSIS — K21.9 GASTROESOPHAGEAL REFLUX DISEASE, UNSPECIFIED WHETHER ESOPHAGITIS PRESENT: Primary | ICD-10-CM

## 2022-06-07 DIAGNOSIS — T14.8XXA HEMATOMA: ICD-10-CM

## 2022-06-07 DIAGNOSIS — Z96.651 STATUS POST TOTAL KNEE REPLACEMENT USING CEMENT, RIGHT: ICD-10-CM

## 2022-06-07 DIAGNOSIS — K29.60 REFLUX GASTRITIS: ICD-10-CM

## 2022-06-07 PROCEDURE — 99214 OFFICE O/P EST MOD 30 MIN: CPT | Performed by: NURSE PRACTITIONER

## 2022-06-07 RX ORDER — PANTOPRAZOLE SODIUM 20 MG/1
20 TABLET, DELAYED RELEASE ORAL DAILY
Qty: 30 TABLET | Refills: 0 | Status: SHIPPED | OUTPATIENT
Start: 2022-06-07 | End: 2022-06-20

## 2022-06-07 NOTE — PROGRESS NOTES
Assessment/Plan:    GERD (gastroesophageal reflux disease)  On PPI  Advised to wean off protonix  Take every other day for the next 2 weeks along with pepcid daily  Then stop protonix and continue pepcid  Status post total knee replacement using cement, right  Doing well post op  Continue tylenol as needed  In physical therapy  Diagnoses and all orders for this visit:    Gastroesophageal reflux disease, unspecified whether esophagitis present    Status post total knee replacement using cement, right    Hematoma  Comments:  resolving  Reflux gastritis  -     pantoprazole (PROTONIX) 20 mg tablet; Take 1 tablet (20 mg total) by mouth daily          Subjective:      Patient ID: Frank Mariee is a 80 y o  female  Here today for follow up LLQ hematoma   Mirella Bae was seen here about a month ago after her right knee replacement  She had a large hematoma around her LLQ from heparin injections  The area has improved  No further pain or bruising  She has a small firm area but improving  Her knee pain has been improving with tylenol and physical therapy  It has been a slower process then her left knee surgery  The following portions of the patient's history were reviewed and updated as appropriate: allergies, current medications, past family history, past medical history, past social history, past surgical history and problem list     Review of Systems   Constitutional: Negative for activity change and appetite change  Respiratory: Negative for shortness of breath  Cardiovascular: Negative for chest pain  Gastrointestinal: Negative for abdominal pain  Musculoskeletal: Positive for arthralgias  Skin: Negative for color change  Neurological: Negative for dizziness, light-headedness and headaches           Objective:      /80   Pulse 98   Temp (!) 96 6 °F (35 9 °C)   Ht 5' 4 05" (1 627 m)   Wt 103 kg (227 lb)   SpO2 99%   BMI 38 90 kg/m²          Physical Exam  Vitals reviewed  Constitutional:       Appearance: Normal appearance  HENT:      Head: Normocephalic and atraumatic  Eyes:      Conjunctiva/sclera: Conjunctivae normal    Cardiovascular:      Rate and Rhythm: Normal rate and regular rhythm  Heart sounds: Normal heart sounds  Pulmonary:      Effort: Pulmonary effort is normal       Breath sounds: Normal breath sounds  Abdominal:      General: Bowel sounds are normal       Palpations: Abdomen is soft  Tenderness: There is no abdominal tenderness  Skin:     General: Skin is warm and dry  Neurological:      Mental Status: She is alert and oriented to person, place, and time     Psychiatric:         Mood and Affect: Mood normal          Behavior: Behavior normal

## 2022-06-07 NOTE — ASSESSMENT & PLAN NOTE
On PPI  Advised to wean off protonix  Take every other day for the next 2 weeks along with pepcid daily  Then stop protonix and continue pepcid

## 2022-06-09 ENCOUNTER — OFFICE VISIT (OUTPATIENT)
Dept: PHYSICAL THERAPY | Facility: CLINIC | Age: 82
End: 2022-06-09
Payer: MEDICARE

## 2022-06-09 DIAGNOSIS — G89.29 CHRONIC PAIN OF RIGHT KNEE: ICD-10-CM

## 2022-06-09 DIAGNOSIS — M17.11 PRIMARY OSTEOARTHRITIS OF RIGHT KNEE: Primary | ICD-10-CM

## 2022-06-09 DIAGNOSIS — M25.561 CHRONIC PAIN OF RIGHT KNEE: ICD-10-CM

## 2022-06-09 PROCEDURE — 97112 NEUROMUSCULAR REEDUCATION: CPT

## 2022-06-09 PROCEDURE — 97110 THERAPEUTIC EXERCISES: CPT

## 2022-06-09 NOTE — PROGRESS NOTES
Daily Note     Today's date: 2022  Patient name: Phil Faust  : 1940  MRN: [id number]  Referring provider: Day Lomax DO  Dx:   Encounter Diagnosis     ICD-10-CM    1  Primary osteoarthritis of right knee  M17 11    2  Chronic pain of right knee  M25 561     G89 29                   Subjective: Pt notes fatigue today, was sore after last session  Feels that breathing difficulty is major issue  Objective: SpO2 - 95-97% throughout entire session, HR  bpm throughout - requires cueing for proper WS and hip activation during stair work, corrects but fatigues quickly  Assessment: Tolerated treatment well  Patient demonstrated fatigue post treatment and would benefit from continued PT  Does well w/ exercise progressions  Fatigue but no increase in pain by end  Some of her vitals were monitored during session, her SpO2 and HR were acceptable throughout, she does not appear to have an overly increased respiration rate, and she does not exercise to the point of significant sweating  Despite this, she requires significant rest due to breathing difficulty  She reports she has follow up next week for this, we will continue to monitor and increase activity on a graded scale  Pt in agreement w/ plan  Plan: Continue per plan of care   standing progressions, carries, check vitals      Eval/ Re-eval Auth #/ Referral # Total visits Start date  Expiration date Total active units  Total manual units  PT only or  PT+OT?   3-21-22 (pre-op) NA NA NA NA NA NA NA   3-31-22 (post-op)                                              Precautions: B diabetic neuropathy, DM II, L TKA 2020    Past Medical History:   Diagnosis Date    [facility]     Carrero's esophagus     last assessed: 2018    BRCA1 negative     BRCA2 negative     [facility] )     stage 1 (left), given adjuvant radiation with Arimidex x 5 years    Cancer [facility])     Left Breast, Lumpectomy  Cataract     last assessed: 3/11/2014    Dysplasia of toenail     last assessed: 8/29/2017    Esophageal reflux     GERD (gastroesophageal reflux disease)     Gross hematuria     last assessed: 2/19/2015    Hematuria     Hiatal hernia     History of radiation therapy     Hypertension     Irregular heart beat     AFIB    Mixed sensory-motor polyneuropathy     Neuropathy     Obesity     Pacemaker     Paroxysmal atrial fibrillation (HCC)     Peripheral neuropathy     Rectal bleeding     Restless leg syndrome     Shortness of breath     last assessed: 1/11/2016         Date 5/23/22 6/2/22 6/6/22 6/9/22 5/19    Visit Number 13 14  15 16  12   AROM         Knee Flexion NT NT  NT NT  112 112   Knee Extension     0  0            Manual         Patellar mobs         STM                           TherEx         Knee/Hip PROM x5-6 mins  x5 mins total  rev no x4 mins total same x4 min   Active w/u         Quad set, glute set x20 x20-30 total   x20 total  X 20 total   Ankle pumps         SLR LAQ x20 2x10 total    Active x 20 total  SAQ x 20    SLR x10   Hip abduction         Knee ext: SAQ, LAQ SAQ x 20  LAQ x 30    x20    Heel slides Heel raises x 20-30 total  Heel/toe raises x 30 throughout  x30  rev Calf stretch in standing over half foam roll x 3 mins      Squats at bar x 20  Squat to march 2x10  Squats at bar 2x10      R side knee PROM w/ HS stretching x 6-7 mins     HS and glute/piri stretching x5 mins  HS and piri stretching x 3-4 mins     3x10 bridging  Reg bridge w/ ad sq x25 total   Step ups 6" 2x10 B    Lat 4" 2x10  Bridging x 20 total       exag gait at bar x 5 laps, retro walking x 5 laps, exag side step x 4 laps  Pt defers gait progressions today   Squat at bar x20     4" ant stair taps x 20     Step ups 6" x10-12 B  Step ups 6" 2x10 B    Step up double taps 6" 2x10 B Leg press 65# x20, 85# x10, 75# x10 Leg press 65-75# 4x10 total     NuStep x6 mins lvl 2-3  Mid session 10 min lvl 3 NU step pre lvl 4 x 7-8 mins  lv 3 x6 min  Pre 10 min lvl 2-3     TherAct         Patient education x4 mins for POC and HEP rev    Rev x2-3 mins Rev x5 mins     Stair negotiation         Car transfer   Standing knee flexion AROM 2 x 10   2 x 10   Progress note measurements x 5 mins       Sit<>stand from airex x10-15 total    exag gait x 3 laps at bar  exag gait x 2-3 laps      6" ant step up x20, 4" ant tap down x 10, 4" lat step ups x20 6" ant step up x20, 4" ant tap down x 20, 4" lat step ups x20 -- 6" step up/down x 20 total    Gait Training            side step at bar x 3-4 mins                         Modalities         CP Home  5 min  Home  10 min post

## 2022-06-10 ENCOUNTER — TELEPHONE (OUTPATIENT)
Dept: CARDIAC SURGERY | Facility: CLINIC | Age: 82
End: 2022-06-10

## 2022-06-10 NOTE — TELEPHONE ENCOUNTER
[facility] [phone]   Pt calling in to schedule follow up with Gyn Onc  Pt expressed understanding to appt date and time

## 2022-06-13 ENCOUNTER — OFFICE VISIT (OUTPATIENT)
Dept: PHYSICAL THERAPY | Facility: CLINIC | Age: 82
End: 2022-06-13
Payer: MEDICARE

## 2022-06-13 DIAGNOSIS — M17.11 PRIMARY OSTEOARTHRITIS OF RIGHT KNEE: Primary | ICD-10-CM

## 2022-06-13 DIAGNOSIS — M25.561 CHRONIC PAIN OF RIGHT KNEE: ICD-10-CM

## 2022-06-13 DIAGNOSIS — G89.29 CHRONIC PAIN OF RIGHT KNEE: ICD-10-CM

## 2022-06-13 PROCEDURE — 97110 THERAPEUTIC EXERCISES: CPT

## 2022-06-13 PROCEDURE — 97112 NEUROMUSCULAR REEDUCATION: CPT

## 2022-06-13 NOTE — PROGRESS NOTES
Daily Note     Today's date: 2022  Patient name: Phil Faust  : 1940  MRN: [id number]  Referring provider: Day Lomax DO  Dx:   Encounter Diagnosis     ICD-10-CM    1  Primary osteoarthritis of right knee  M17 11    2  Chronic pain of right knee  M25 561     G89 29                   Subjective: Pt reports that she is tired and out of breath today  Will contact MD following session as she feels legs and ankles are more swollen  Felt okay after last session  Objective: SpO2 - 90-97% throughout entire session, HR 85-96 bpm throughout - requires cueing for forward WS and proper glute drive during elevated sit<>stand, corrects but fatigues quickly       Assessment: Tolerated treatment well  Patient demonstrated fatigue post treatment and would benefit from continued PT  Does well w/ continued exercise progressions  Again shows small increase in ability to tolerate standing progressions w/ less need for rest  Shows improving ability to WB on one leg during stairs but still would be unsafe w/o UE support  SpO2 does drop to 90% briefly after first set of stairs but quickly rebounds w/ deep breathing  Will benefit from more aggressive challenges barring setback  Pt was given list of exercises that would be safe to perform in gym  Will be checking in with  to discuss continued exercise post d/c        Plan: Continue per plan of care   hallway walking, carrying, step downs     Eval/ Re-eval Auth #/ Referral # Total visits Start date  Expiration date Total active units  Total manual units  PT only or  PT+OT?   3-21-22 (pre-op) NA NA NA NA NA NA NA   3-31-22 (post-op)                                              Precautions: B diabetic neuropathy, DM II, L TKA 2020    Past Medical History:   Diagnosis Date    [facility]     Carrero's esophagus     last assessed: 2018    BRCA1 negative     BRCA2 negative     [facility] ) 2006    stage 1 (left), given adjuvant radiation with Arimidex x 5 years    Cancer [facility]) 2006    Left Breast, Lumpectomy    Cataract     last assessed: 3/11/2014    Dysplasia of toenail     last assessed: 8/29/2017    Esophageal reflux     GERD (gastroesophageal reflux disease)     Gross hematuria     last assessed: 2/19/2015    Hematuria     Hiatal hernia     History of radiation therapy     Hypertension     Irregular heart beat     AFIB    Mixed sensory-motor polyneuropathy     Neuropathy     Obesity     Pacemaker     Paroxysmal atrial fibrillation (HCC)     Peripheral neuropathy     Rectal bleeding     Restless leg syndrome     Shortness of breath     last assessed: 1/11/2016         Date 5/23/22 6/2/22 6/6/22 6/9/22 6/13     Visit Number 13 14  15 16 17    AROM         Knee Flexion NT NT  NT NT  NT  112   Knee Extension      0            Manual         Patellar mobs         STM                           TherEx         Knee/Hip PROM x5-6 mins  x5 mins total  rev no Rev of HEP, POC, gym program x 5 mins during rest periods  x4 min   Active w/u         Quad set, glute set x20 x20-30 total    X 20 total   Ankle pumps         SLR LAQ x20 2x10 total     SAQ x 20    SLR x10   Hip abduction         Knee ext: SAQ, LAQ SAQ x 20  LAQ x 30        Heel slides Heel raises x 20-30 total  Heel/toe raises x 30 throughout  x30  x20  Calf stretch in standing over half foam roll x 3 mins      Squats at bar x 20  Squat to march 2x10  Sit<>stand from one airex 2x10      R side knee PROM w/ HS stretching x 6-7 mins     Standing hip abd kicks yellow tband 3x10 total HS and piri stretching x 3-4 mins     3x10 bridging  Reg bridge w/ ad sq x25 total   Step ups 6" 2x10 B    Lat 4" 2x10  Step ups 6" 2x10 B, up and down 4-6" x 6 sets        exag gait at bar x 5 laps, retro walking x 5 laps, exag side step x 4 laps  Pt defers gait progressions today   Squat at bar x20     4" ant stair taps x 20     Step ups 6" x10-12 B  Step ups 6" 2x10 B    Step up double taps 6" 2x10 B Leg press 65# x20, 85# x10, 75# x10 l     NuStep x6 mins lvl 2-3  Mid session 10 min lvl 3 NU step pre lvl 4 x 7-8 mins  lv 3 x6 min  Pre 6 mins min lvl 3 LE only      TherAct         Patient education x4 mins for POC and HEP rev    Rev x2-3 mins     Stair negotiation         Car transfer   Standing knee flexion AROM 2 x 10   2 x 10         Sit<>stand from airex x10-15 total     exag gait x 2-3 laps      6" ant step up x20, 4" ant tap down x 10, 4" lat step ups x20 6" ant step up x20, 4" ant tap down x 20, 4" lat step ups x20 --     Gait Training            side step at bar x 3-4 mins                         Modalities         CP Home  5 min  Home

## 2022-06-16 ENCOUNTER — APPOINTMENT (OUTPATIENT)
Dept: PHYSICAL THERAPY | Facility: CLINIC | Age: 82
End: 2022-06-16
Payer: MEDICARE

## 2022-06-17 ENCOUNTER — PATIENT OUTREACH (OUTPATIENT)
Dept: INTERNAL MEDICINE CLINIC | Facility: CLINIC | Age: 82
End: 2022-06-17

## 2022-06-17 ENCOUNTER — EVALUATION (OUTPATIENT)
Dept: PHYSICAL THERAPY | Facility: CLINIC | Age: 82
End: 2022-06-17
Payer: MEDICARE

## 2022-06-17 DIAGNOSIS — M25.561 CHRONIC PAIN OF RIGHT KNEE: ICD-10-CM

## 2022-06-17 DIAGNOSIS — G89.29 CHRONIC PAIN OF RIGHT KNEE: ICD-10-CM

## 2022-06-17 DIAGNOSIS — M17.11 PRIMARY OSTEOARTHRITIS OF RIGHT KNEE: Primary | ICD-10-CM

## 2022-06-17 PROCEDURE — 97112 NEUROMUSCULAR REEDUCATION: CPT

## 2022-06-17 PROCEDURE — 97110 THERAPEUTIC EXERCISES: CPT

## 2022-06-17 NOTE — PROGRESS NOTES
Progress Note     Today's date: 2022  Patient name: Ophelia Osgood  : 1940  MRN: [id number]  Referring provider: Arias Timmons DO  Dx:   Encounter Diagnosis     ICD-10-CM    1  Primary osteoarthritis of right knee  M17 11    2  Chronic pain of right knee  M25 561     G89 29                   Subjective: Pt reports that severe pain in knees has dissipated in the last few days  R knee is more sore than R, has some tightness in posterior aspect of R leg  Feels she is improving overall  Has PCP follow up next week and will inquire about possible referral to cardiology  Notes that fatigue and decreased conditioning are still major limiting factors  Functional update below:         Goals  Short Term Goals (4 weeks): ALL PROGRESSED   - Patient will be independent in basic HEP 2-3 weeks  - Patient will demonstrate >100 degrees of knee ROM for reciprocal stair negotiation  - Patient will have 0/10 pain at rest  - Patient will demonstrate >1/3 improvement in MMT grade as applicable    Long Term Goals (8 weeks): ALL PROGRESSED   - Patient will be independent in a comprehensive home exercise program  - Patient FOTO score will improve to at least 10 points higher than post op score    - Patient will ambulation with AD PRN  - Patient will independently ambulate >1000 feet (community ambulation)  - Patient will self-report >75% improvement in function    *goals extended by 2 and 4 weeks, respectively     : New goals to be made should treatment continue past MD visit next week     Subjective  Pain  - Current pain ratin-3/10  - At best pain ratin/10  - At worst pain ratin-7 /10  - Location: R knee   - Aggravating factors: stairs, ambulation, functional transfers, prolonged WB     Social Support  - Steps to enter house: 2-3  - Stairs in house: 13  - Bedroom/bathroom set up: normal   - DME available: walker   - Lives in: 2 story home - can stay on first floor   - Lives with: spouse       Objective LE MMT  R knee flex/ext grossly 4/5  L knee flex/ext grossly 4+/5    B hips grossly 4- to 4/5     Core no greater than 1+/5     LE ROM    - L Knee Flexion: 118 degrees (124 passive) R Knee Flexion: 116 degrees (120 passive)  - L Knee Extension: 0 degrees  R Knee Extension: 0 degrees    Sensation  - Light touch: intact     Skin Check  - (-) redness, warmth, drainage    Knee Comments  - Gait Assessment: no AD in clinic, fatigues quickly, decreased step length on R, min forward trunk flexion, decreased pace   - Stair Negotiation: 8" up, no higher than 6" down w/ need for UE support w/ both - fair glute drive   - Squat: at bar through 66% of motion through 5 reps, quick fatigue and need for UE support   - SLS: 4-5 sec before LOB         Assessment: Tolerated treatment well  Patient demonstrated fatigue post treatment and would benefit from continued PT  Does well w/ exercises within session, however she still demonstrates significant fatigue by end  She has made good progress towards goals but is in need for significant increase in strength in order to complete daily activities w/ greater ease  She has improved in her pain free knee ROM, strength, self reports of pain, and quality of motion  She is interested in joining gym but has not yet completed this task  She will be seen twice next week, along w/ MD follow up  We discussed extending care but pt will take weekend to decide  Plan: Continue per plan of care   prepare for MD visit, leg strength work as able     Eval/ Re-eval Auth #/ Referral # Total visits Start date  Expiration date Total active units  Total manual units  PT only or  PT+OT?   3-21-22 (pre-op) NA NA NA NA NA NA NA   3-31-22 (post-op)           Re-eval 5/16, 6/17                                    Precautions: B diabetic neuropathy, DM II, L TKA August 2020    Past Medical History:   Diagnosis Date    [facility]     Carrero's esophagus     last assessed: 1/23/2018    BRCA1 negative     BRCA2 negative     [facility] ([facility] ) 2006    stage 1 (left), given adjuvant radiation with Arimidex x 5 years    Cancer [facility]) 2006    Left Breast, Lumpectomy    Cataract     last assessed: 3/11/2014    Dysplasia of toenail     last assessed: 8/29/2017    Esophageal reflux     GERD (gastroesophageal reflux disease)     Gross hematuria     last assessed: 2/19/2015    Hematuria     Hiatal hernia     History of radiation therapy     Hypertension     Irregular heart beat     AFIB    Mixed sensory-motor polyneuropathy     Neuropathy     Obesity     Pacemaker     Paroxysmal atrial fibrillation (HCC)     Peripheral neuropathy     Rectal bleeding     Restless leg syndrome     Shortness of breath     last assessed: 1/11/2016         Date 5/23/22 6/2/22 6/6/22 6/9/22 6/13 6/17 (PN)   Visit Number 13 14  15 16 17 18   AROM         Knee Flexion NT NT  NT NT  NT  116   Knee Extension      0            Manual         Patellar mobs         STM                           TherEx         Knee/Hip PROM x5-6 mins  x5 mins total  rev no Rev of HEP, POC, gym program x 5 mins during rest periods  x4 min   Active w/u         Quad set, glute set x20 x20-30 total    X 20 total   Ankle pumps         SLR LAQ x20 2x10 total     SLR x15 total   Hip abduction         Knee ext: SAQ, LAQ SAQ x 20  LAQ x 30        Heel slides Heel raises x 20-30 total  Heel/toe raises x 30 throughout  x30  x20        Squats at bar x 20  Squat to march 2x10  Sit<>stand from one airex 2x10  Sit<>stand x 10 total    R side knee PROM w/ HS stretching x 6-7 mins     Standing hip abd kicks yellow tband 3x10 total     3x10 bridging  Reg bridge w/ ad sq x25 total   Step ups 6" 2x10 B    Lat 4" 2x10  Step ups 6" 2x10 B, up and down 4-6" x 6 sets  Step ups 6" x20, up/down x 10-15       exag gait at bar x 5 laps, retro walking x 5 laps, exag side step x 4 laps  Pt defers gait progressions today   Squats x10 total      Step ups 6" x10-12 B Step ups 6" 2x10 B    Step up double taps 6" 2x10 B Leg press 65# x20, 85# x10, 75# x10 l  Leg press 75# 3x10   NuStep x6 mins lvl 2-3  Mid session 10 min lvl 3 NU step pre lvl 4 x 7-8 mins  lv 3 x6 min  Pre 6 mins min lvl 3 LE only   Pre x 8 min lvl 3-4    TherAct         Patient education x4 mins for POC and HEP rev    Rev x2-3 mins  Progress note update x 5 min   Stair negotiation         Car transfer   Standing knee flexion AROM 2 x 10   2 x 10         Sit<>stand from airex x10-15 total          6" ant step up x20, 4" ant tap down x 10, 4" lat step ups x20 6" ant step up x20, 4" ant tap down x 20, 4" lat step ups x20 --     Gait Training            side step at bar x 3-4 mins                         Modalities         CP Home  5 min  Home

## 2022-06-20 ENCOUNTER — ANTICOAG VISIT (OUTPATIENT)
Dept: CARDIOLOGY CLINIC | Facility: CLINIC | Age: 82
End: 2022-06-20

## 2022-06-20 ENCOUNTER — OFFICE VISIT (OUTPATIENT)
Dept: INTERNAL MEDICINE CLINIC | Facility: CLINIC | Age: 82
End: 2022-06-20
Payer: MEDICARE

## 2022-06-20 ENCOUNTER — APPOINTMENT (OUTPATIENT)
Dept: LAB | Facility: HOSPITAL | Age: 82
End: 2022-06-20
Payer: MEDICARE

## 2022-06-20 ENCOUNTER — OFFICE VISIT (OUTPATIENT)
Dept: PHYSICAL THERAPY | Facility: CLINIC | Age: 82
End: 2022-06-20
Payer: MEDICARE

## 2022-06-20 VITALS
TEMPERATURE: 97.4 F | BODY MASS INDEX: 38.24 KG/M2 | HEART RATE: 73 BPM | DIASTOLIC BLOOD PRESSURE: 60 MMHG | OXYGEN SATURATION: 97 % | SYSTOLIC BLOOD PRESSURE: 125 MMHG | WEIGHT: 224 LBS | HEIGHT: 64 IN

## 2022-06-20 DIAGNOSIS — J30.2 SEASONAL ALLERGIES: ICD-10-CM

## 2022-06-20 DIAGNOSIS — I50.32 CHRONIC DIASTOLIC HEART FAILURE (HCC): ICD-10-CM

## 2022-06-20 DIAGNOSIS — G89.29 CHRONIC PAIN OF RIGHT KNEE: ICD-10-CM

## 2022-06-20 DIAGNOSIS — Z12.31 ENCOUNTER FOR SCREENING MAMMOGRAM FOR BREAST CANCER: ICD-10-CM

## 2022-06-20 DIAGNOSIS — H50.10 EXOTROPIA OF RIGHT EYE: ICD-10-CM

## 2022-06-20 DIAGNOSIS — E66.01 SEVERE OBESITY (BMI 35.0-39.9) WITH COMORBIDITY (HCC): ICD-10-CM

## 2022-06-20 DIAGNOSIS — E11.42 DIABETIC POLYNEUROPATHY ASSOCIATED WITH TYPE 2 DIABETES MELLITUS (HCC): ICD-10-CM

## 2022-06-20 DIAGNOSIS — Z96.651 STATUS POST TOTAL KNEE REPLACEMENT USING CEMENT, RIGHT: ICD-10-CM

## 2022-06-20 DIAGNOSIS — M25.561 CHRONIC PAIN OF RIGHT KNEE: ICD-10-CM

## 2022-06-20 DIAGNOSIS — Z79.899 ENCOUNTER FOR LONG-TERM CURRENT USE OF MEDICATION: ICD-10-CM

## 2022-06-20 DIAGNOSIS — I48.20 CHRONIC ATRIAL FIBRILLATION (HCC): Primary | ICD-10-CM

## 2022-06-20 DIAGNOSIS — K21.9 GASTROESOPHAGEAL REFLUX DISEASE, UNSPECIFIED WHETHER ESOPHAGITIS PRESENT: ICD-10-CM

## 2022-06-20 DIAGNOSIS — N18.31 STAGE 3A CHRONIC KIDNEY DISEASE (HCC): ICD-10-CM

## 2022-06-20 DIAGNOSIS — M17.11 PRIMARY OSTEOARTHRITIS OF RIGHT KNEE: Primary | ICD-10-CM

## 2022-06-20 DIAGNOSIS — I10 ESSENTIAL HYPERTENSION: ICD-10-CM

## 2022-06-20 DIAGNOSIS — G25.81 RLS (RESTLESS LEGS SYNDROME): ICD-10-CM

## 2022-06-20 LAB
ANION GAP SERPL CALCULATED.3IONS-SCNC: 5 MMOL/L (ref 4–13)
BUN SERPL-MCNC: 21 MG/DL (ref 5–25)
CALCIUM SERPL-MCNC: 9.2 MG/DL (ref 8.3–10.1)
CHLORIDE SERPL-SCNC: 105 MMOL/L (ref 100–108)
CO2 SERPL-SCNC: 33 MMOL/L (ref 21–32)
CREAT SERPL-MCNC: 0.94 MG/DL (ref 0.6–1.3)
GFR SERPL CREATININE-BSD FRML MDRD: 57 ML/MIN/1.73SQ M
GLUCOSE P FAST SERPL-MCNC: 95 MG/DL (ref 65–99)
POTASSIUM SERPL-SCNC: 4 MMOL/L (ref 3.5–5.3)
SODIUM SERPL-SCNC: 143 MMOL/L (ref 136–145)

## 2022-06-20 PROCEDURE — 97110 THERAPEUTIC EXERCISES: CPT

## 2022-06-20 PROCEDURE — 80048 BASIC METABOLIC PNL TOTAL CA: CPT

## 2022-06-20 PROCEDURE — 97112 NEUROMUSCULAR REEDUCATION: CPT

## 2022-06-20 PROCEDURE — 99214 OFFICE O/P EST MOD 30 MIN: CPT | Performed by: INTERNAL MEDICINE

## 2022-06-20 RX ORDER — FLUTICASONE PROPIONATE 50 MCG
1 SPRAY, SUSPENSION (ML) NASAL DAILY
Qty: 16 ML | Refills: 2 | Status: SHIPPED | OUTPATIENT
Start: 2022-06-20

## 2022-06-20 NOTE — PROGRESS NOTES
Assessment/Plan:    Status post total knee replacement using cement, right  Ongoing physical therapy  GERD (gastroesophageal reflux disease)  Improved, taking PPI    Atrial fibrillation (HCC) [I48 91]  No symptoms  On metoprolol  Warfarin per cardiology  Stage 3a chronic kidney disease [facility])  Lab Results   Component Value Date    EGFR 57 06/20/2022    EGFR 64 06/06/2022    EGFR 54 04/17/2022    CREATININE 0 94 06/20/2022    CREATININE 0 86 06/06/2022    CREATININE 0 99 04/17/2022     No NSAIDs  Diabetic polyneuropathy associated with type 2 diabetes mellitus ([facility] )    Lab Results   Component Value Date    HGBA1C 5 3 04/28/2022     A1c remains normal     RLS (restless legs syndrome)  Taking pramipexole and gabapentin  Per neurology  Severe obesity (BMI 35 0-39  9) with comorbidity ([place] [address] )  Weight loss of 8 lbs since last visit  Seasonal allergies  Using Flonase daily, antihistamine prn  Essential hypertension  BP stable, on metoprolol and furosemide  Exotropia of right eye  Encouraged to follow up with Ophtha  Diagnoses and all orders for this visit:    Chronic atrial fibrillation (HCC)    Stage 3a chronic kidney disease ([place] [address] )  -     CBC and differential; Future  -     Comprehensive metabolic panel; Future  -     Vitamin D 25 hydroxy; Future    Diabetic polyneuropathy associated with type 2 diabetes mellitus (HCC)  -     Hemoglobin A1C; Future    Exotropia of right eye    Gastroesophageal reflux disease, unspecified whether esophagitis present    RLS (restless legs syndrome)    Severe obesity (BMI 35 0-39  9) with comorbidity ([place] [address] )    Status post total knee replacement using cement, right    Essential hypertension  -     Lipid panel; Future  -     TSH, 3rd generation with Free T4 reflex;  Future    Encounter for long-term current use of medication  -     Vitamin B12; Future    Encounter for screening mammogram for breast cancer  -     Mammo screening bilateral w 3d & cad; Future    Seasonal allergies  -     fluticasone (FLONASE) 50 mcg/act nasal spray; 1 spray into each nostril daily    Follow up in 5 months or as needed  Subjective:      Patient ID: Tatiana Gonsalez is a 80 y o  female here for a follow up  She has been feeling alright  She reports very slow progress with her right knee surgery  She will soon complete physical therapy, not looking forward to exercising on her own  She does not use the walker, uses the cane sometimes  She reports right leg is still not normal looking, still has occasional leg swelling  She takes additional furosemide as needed  She reports wound has healed  She takes Tylenol at least once a day, especially if she is leaving the house  She reports feeling tired with shortness of breath sometimes, no chest pain  She was at the ER a few weeks ago with chest pains, has not recurred  The following portions of the patient's history were reviewed and updated as appropriate: allergies, current medications, past medical history, past social history and problem list     Review of Systems   Constitutional: Negative for appetite change and fatigue  HENT: Negative for congestion, ear pain and postnasal drip  Eyes: Positive for visual disturbance  Respiratory: Negative for cough and shortness of breath  Cardiovascular: Positive for leg swelling  Negative for chest pain  Gastrointestinal: Negative for abdominal pain, constipation and diarrhea  Genitourinary: Negative for dysuria, frequency and urgency  Musculoskeletal: Positive for arthralgias and gait problem  Negative for joint swelling and myalgias  Skin: Positive for color change  Negative for rash and wound  Neurological: Negative for dizziness, numbness and headaches  Hematological: Does not bruise/bleed easily  Psychiatric/Behavioral: Negative for confusion and sleep disturbance  The patient is not nervous/anxious            Objective:      /60   Pulse 73   Temp (!) 97 4 °F (36 3 °C)   Ht 5' 4 05" (1 627 m)   Wt 102 kg (224 lb)   SpO2 97%   BMI 38 39 kg/m²          Physical Exam  Vitals and nursing note reviewed  Constitutional:       General: She is not in acute distress  Appearance: She is well-developed  HENT:      Head: Normocephalic and atraumatic  Eyes:      Pupils: Pupils are equal, round, and reactive to light  Cardiovascular:      Rate and Rhythm: Normal rate and regular rhythm  Heart sounds: Normal heart sounds  Pulmonary:      Effort: Pulmonary effort is normal       Breath sounds: Normal breath sounds  No wheezing  Abdominal:      General: Bowel sounds are normal       Palpations: Abdomen is soft  Musculoskeletal:         General: No swelling  Right lower leg: No edema  Left lower leg: No edema  Legs:       Comments: No assistive device   Skin:     General: Skin is warm  Findings: No erythema or rash  Neurological:      General: No focal deficit present  Mental Status: She is alert and oriented to person, place, and time  Psychiatric:         Mood and Affect: Mood and affect normal  Mood is not anxious or depressed  Behavior: Behavior normal            Labs & imaging results reviewed with patient

## 2022-06-20 NOTE — ASSESSMENT & PLAN NOTE
Lab Results   Component Value Date    EGFR 57 06/20/2022    EGFR 64 06/06/2022    EGFR 54 04/17/2022    CREATININE 0 94 06/20/2022    CREATININE 0 86 06/06/2022    CREATININE 0 99 04/17/2022     No NSAIDs

## 2022-06-20 NOTE — PROGRESS NOTES
Daily Note     Today's date: 2022  Patient name: Mk Garcia  : 1940  MRN: [id number]  Referring provider: Vernon Pulido DO  Dx:   Encounter Diagnosis     ICD-10-CM    1  Primary osteoarthritis of right knee  M17 11    2  Chronic pain of right knee  M25 561     G89 29                   Subjective: Pt reports that she did well over weekend, has not yet joined gym but plans to do that this week  Objective: requires less UE support throughout stair training, no LOB throughout       Assessment: Tolerated treatment well  Patient demonstrated fatigue post treatment and would benefit from continued PT  Does well w/ exercise progressions today  Tolerates longer total time on feet w/ less fatigue overall  Heavily encouraged to follow up w/ home gym, pt shows good understanding  Increase intensity as able at next session  Plan: Continue per plan of care   single leg press, squat work, carries - check gym      Eval/ Re-eval Auth #/ Referral # Total visits Start date  Expiration date Total active units  Total manual units  PT only or  PT+OT?   3-21-22 (pre-op) NA NA NA NA NA NA NA   3-31-22 (post-op)           Re-eval ,                                     Precautions: B diabetic neuropathy, DM II, L TKA 2020    Past Medical History:   Diagnosis Date    [facility] )     Carrero's esophagus     last assessed: 2018    BRCA1 negative     BRCA2 negative     [facility] ([facility] )     stage 1 (left), given adjuvant radiation with Arimidex x 5 years    Cancer [facility])     Left Breast, Lumpectomy    Cataract     last assessed: 3/11/2014    Dysplasia of toenail     last assessed: 2017    Esophageal reflux     GERD (gastroesophageal reflux disease)     Gross hematuria     last assessed: 2015    Hematuria     Hiatal hernia     History of radiation therapy     Hypertension     Irregular heart beat     AFIB    Mixed sensory-motor polyneuropathy  Neuropathy     Obesity     Pacemaker     Paroxysmal atrial fibrillation (HCC)     Peripheral neuropathy     Rectal bleeding     Restless leg syndrome     Shortness of breath     last assessed: 1/11/2016         Date 6/20 6/6/22 6/9/22 6/13 6/17 (PN)   Visit Number 19  15 16 17 18   AROM         Knee Flexion NT NT  NT NT  NT  116   Knee Extension      0            Manual         Patellar mobs         STM                           TherEx         Knee/Hip PROM  x5 mins total  rev no Rev of HEP, POC, gym program x 5 mins during rest periods  x4 min   Active w/u         Quad set, glute set  x20-30 total    X 20 total   Ankle pumps         SLR  2x10 total     SLR x15 total   Hip abduction         Knee ext: SAQ, LAQ  LAQ x 30        Heel slides Heel raises x 20-30 total  Heel/toe raises x 30 throughout  x30  x20        Squats at bar x 20  Squat to march 2x10  Sit<>stand from one airex 2x10  Sit<>stand x 10 total        Standing hip abd kicks yellow tband 3x10 total     Step ups 6" x20, taps x 20, up/down x 10  Reg bridge w/ ad sq x25 total   Step ups 6" 2x10 B    Lat 4" 2x10  Step ups 6" 2x10 B, up and down 4-6" x 6 sets  Step ups 6" x20, up/down x 10-15     exag side step x 5 laps   exag gait at bar x 5 laps, retro walking x 5 laps, exag side step x 4 laps  Pt defers gait progressions today   Squats x10 total     Leg press 75# 3x12  Step ups 6" x10-12 B  Step ups 6" 2x10 B    Step up double taps 6" 2x10 B Leg press 65# x20, 85# x10, 75# x10 l  Leg press 75# 3x10   NuStep x10 min pre lvl 3  Mid session 10 min lvl 3 NU step pre lvl 4 x 7-8 mins  lv 3 x6 min  Pre 6 mins min lvl 3 LE only   Pre x 8 min lvl 3-4    TherAct         Patient education x4 mins for POC and HEP rev    Rev x2-3 mins  Progress note update x 5 min   Stair negotiation         Car transfer   Standing knee flexion AROM 2 x 10   2 x 10         Sit<>stand from airex x10-15 total          6" ant step up x20, 4" ant tap down x 10, 4" lat step ups x20 6" ant step up x20, 4" ant tap down x 20, 4" lat step ups x20 --     Gait Training            side step at bar x 3-4 mins                         Modalities         CP Home  5 min  Home

## 2022-06-21 ENCOUNTER — HOSPITAL ENCOUNTER (OUTPATIENT)
Dept: NON INVASIVE DIAGNOSTICS | Facility: HOSPITAL | Age: 82
Discharge: HOME | End: 2022-06-21
Payer: MEDICARE

## 2022-06-21 VITALS
SYSTOLIC BLOOD PRESSURE: 125 MMHG | HEIGHT: 64 IN | HEART RATE: 88 BPM | BODY MASS INDEX: 38.76 KG/M2 | DIASTOLIC BLOOD PRESSURE: 60 MMHG | WEIGHT: 227 LBS

## 2022-06-21 DIAGNOSIS — I10 ESSENTIAL HYPERTENSION: ICD-10-CM

## 2022-06-21 DIAGNOSIS — R07.89 OTHER CHEST PAIN: ICD-10-CM

## 2022-06-21 DIAGNOSIS — I48.20 CHRONIC ATRIAL FIBRILLATION (HCC): ICD-10-CM

## 2022-06-21 PROCEDURE — 93306 TTE W/DOPPLER COMPLETE: CPT

## 2022-06-22 ENCOUNTER — APPOINTMENT (OUTPATIENT)
Dept: RADIOLOGY | Facility: CLINIC | Age: 82
End: 2022-06-22
Payer: MEDICARE

## 2022-06-22 ENCOUNTER — OFFICE VISIT (OUTPATIENT)
Dept: OBGYN CLINIC | Facility: CLINIC | Age: 82
End: 2022-06-22

## 2022-06-22 VITALS
HEART RATE: 90 BPM | SYSTOLIC BLOOD PRESSURE: 118 MMHG | DIASTOLIC BLOOD PRESSURE: 78 MMHG | WEIGHT: 227 LBS | HEIGHT: 64 IN | BODY MASS INDEX: 38.76 KG/M2

## 2022-06-22 DIAGNOSIS — Z96.651 STATUS POST TOTAL RIGHT KNEE REPLACEMENT USING CEMENT: ICD-10-CM

## 2022-06-22 DIAGNOSIS — M22.2X2 BILATERAL PATELLOFEMORAL SYNDROME: ICD-10-CM

## 2022-06-22 DIAGNOSIS — M22.2X1 BILATERAL PATELLOFEMORAL SYNDROME: ICD-10-CM

## 2022-06-22 DIAGNOSIS — Z96.651 AFTERCARE FOLLOWING RIGHT KNEE JOINT REPLACEMENT SURGERY: ICD-10-CM

## 2022-06-22 DIAGNOSIS — R53.81 PHYSICAL DECONDITIONING: ICD-10-CM

## 2022-06-22 DIAGNOSIS — Z47.1 AFTERCARE FOLLOWING RIGHT KNEE JOINT REPLACEMENT SURGERY: ICD-10-CM

## 2022-06-22 DIAGNOSIS — Z96.651 STATUS POST TOTAL RIGHT KNEE REPLACEMENT USING CEMENT: Primary | ICD-10-CM

## 2022-06-22 LAB
AORTIC ROOT: 2.6 CM
AORTIC VALVE MEAN VELOCITY: 20.8 M/S
APICAL FOUR CHAMBER EJECTION FRACTION: 45 %
AV AREA BY CONTINUOUS VTI: 0.8 CM2
AV AREA PEAK VELOCITY: 0.7 CM2
AV LVOT MEAN GRADIENT: 1 MMHG
AV LVOT PEAK GRADIENT: 2 MMHG
AV MEAN GRADIENT: 20 MMHG
AV PEAK GRADIENT: 37 MMHG
AV VALVE AREA: 0.77 CM2
AV VELOCITY RATIO: 0.24
DOP CALC AO PEAK VEL: 3.06 M/S
DOP CALC AO VTI: 62.13 CM
DOP CALC LVOT AREA: 2.83 CM2
DOP CALC LVOT DIAMETER: 1.9 CM
DOP CALC LVOT PEAK VEL VTI: 16.99 CM
DOP CALC LVOT PEAK VEL: 0.74 M/S
DOP CALC LVOT STROKE INDEX: 23.4 ML/M2
DOP CALC LVOT STROKE VOLUME: 48.15
FRACTIONAL SHORTENING: 21 (ref 28–44)
INTERVENTRICULAR SEPTUM IN DIASTOLE (PARASTERNAL SHORT AXIS VIEW): 1.1 CM
INTERVENTRICULAR SEPTUM: 1.1 CM (ref 0.6–1.1)
LAAS-AP2: 28.7 CM2
LAAS-AP4: 25.8 CM2
LEFT ATRIUM AREA SYSTOLE SINGLE PLANE A4C: 27.2 CM2
LEFT ATRIUM SIZE: 5 CM
LEFT INTERNAL DIMENSION IN SYSTOLE: 3.7 CM (ref 2.1–4)
LEFT VENTRICULAR INTERNAL DIMENSION IN DIASTOLE: 4.7 CM (ref 3.5–6)
LEFT VENTRICULAR POSTERIOR WALL IN END DIASTOLE: 0.9 CM
LEFT VENTRICULAR STROKE VOLUME: 46 ML
LVSV (TEICH): 46 ML
MV E'TISSUE VEL-LAT: 15 CM/S
MV E'TISSUE VEL-SEP: 10 CM/S
RA PRESSURE ESTIMATED: 8 MMHG
RIGHT ATRIUM AREA SYSTOLE A4C: 42.4 CM2
RIGHT VENTRICLE ID DIMENSION: 4.7 CM
RV PSP: 34 MMHG
SL CV LEFT ATRIUM LENGTH A2C: 6.4 CM
SL CV LV EF: 45
SL CV PED ECHO LEFT VENTRICLE DIASTOLIC VOLUME (MOD BIPLANE) 2D: 105 ML
SL CV PED ECHO LEFT VENTRICLE SYSTOLIC VOLUME (MOD BIPLANE) 2D: 58 ML
TR MAX PG: 26 MMHG
TR PEAK VELOCITY: 2.5 M/S
TRICUSPID VALVE PEAK REGURGITATION VELOCITY: 2.53 M/S

## 2022-06-22 PROCEDURE — 99024 POSTOP FOLLOW-UP VISIT: CPT | Performed by: ORTHOPAEDIC SURGERY

## 2022-06-22 PROCEDURE — 73562 X-RAY EXAM OF KNEE 3: CPT

## 2022-06-22 PROCEDURE — 93306 TTE W/DOPPLER COMPLETE: CPT | Performed by: INTERNAL MEDICINE

## 2022-06-22 RX ORDER — TRAMADOL HYDROCHLORIDE 50 MG/1
50 TABLET ORAL ONCE AS NEEDED
Qty: 14 TABLET | Refills: 0 | Status: SHIPPED | OUTPATIENT
Start: 2022-06-22

## 2022-06-22 NOTE — PROGRESS NOTES
Assessment/Plan:  1  Status post total right knee replacement using cement  XR knee 3 vw right non injury    Ambulatory Referral to Physical Therapy   2  Aftercare following right knee joint replacement surgery  XR knee 3 vw right non injury    Ambulatory Referral to Physical Therapy   3  Physical deconditioning  Ambulatory Referral to Physical Therapy   4  Bilateral patellofemoral syndrome  traMADol (Ultram) 50 mg tablet    Ambulatory Referral to Physical Therapy     Scribe Attestation    I,:  Maria Del Rosario Velazquez am acting as a scribe while in the presence of the attending physician :       I,:  Zay Franz DO personally performed the services described in this documentation    as scribed in my presence :         Elsi Dunn is a pleasant 57-year-old female who returns today for follow-up evaluation 12 weeks status post robotic assisted right total knee arthroplasty  Continue strengthening on own at the gym  Subjective: Follow-up evaluation 12 weeks status post robotic assisted right total knee arthroplasty    Patient ID: Anupam Banks is a 80 y o  female who returns today for follow-up evaluation 12 weeks status post robotic assisted right total knee arthroplasty  She reports that she has been doing well  She does complain of some persistent discomfort about the anterior aspect of her bilateral knees with standing  She reports that she does not experience this pain when sitting or walking  She is utilizing a cane for ambulatory assistance  She denies any new injury or trauma  Review of Systems   Constitutional: Positive for activity change  Negative for chills, fever and unexpected weight change  HENT: Negative for hearing loss, nosebleeds and sore throat  Eyes: Negative for pain, redness and visual disturbance  Respiratory: Negative for cough, shortness of breath and wheezing  Cardiovascular: Negative for chest pain, palpitations and leg swelling     Gastrointestinal: Negative for abdominal pain, nausea and vomiting  Endocrine: Negative for polydipsia and polyuria  Genitourinary: Negative for dysuria and hematuria  Musculoskeletal: Positive for myalgias  Negative for arthralgias and joint swelling  See HPI   Skin: Negative for rash and wound  Neurological: Negative for dizziness, numbness and headaches  Psychiatric/Behavioral: Negative for decreased concentration and suicidal ideas  The patient is not nervous/anxious            Past Medical History:   Diagnosis Date    Arthritis     Atrial fibrillation ([facility] )     Carrero's esophagus     last assessed: 1/23/2018    BRCA1 negative     BRCA2 negative     Breast cancer ([facility] ) 2006    stage 1 (left), given adjuvant radiation with Arimidex x 5 years    Cancer [facility]) 2006    Left Breast, Lumpectomy    Cataract     last assessed: 3/11/2014    Dysplasia of toenail     last assessed: 8/29/2017    Esophageal reflux     GERD (gastroesophageal reflux disease)     Gross hematuria     last assessed: 2/19/2015    Hematuria     Hiatal hernia     History of radiation therapy     Hypertension     Irregular heart beat     AFIB    Mixed sensory-motor polyneuropathy     Neuropathy     Obesity     Pacemaker     Paroxysmal atrial fibrillation (HCC)     Peripheral neuropathy     Rectal bleeding     Restless leg syndrome     Shortness of breath     last assessed: 1/11/2016       Past Surgical History:   Procedure Laterality Date    BREAST BIOPSY Left 2006    BREAST LUMPECTOMY Left 2006    onset: 2006    BREAST SURGERY      CARDIAC PACEMAKER PLACEMENT      x 3 2006    CATARACT EXTRACTION      COLONOSCOPY      CYSTOSCOPY  04/04/2014    diagnostic    HYSTERECTOMY      ALISON BSO; due to fibroid uterus; age 38   Emaline Folds KNEE CARTILAGE SURGERY      excision lesion of meniscus or capsule knee    KNEE SURGERY      OOPHORECTOMY Bilateral     OTHER SURGICAL HISTORY      radiation therapy    [place] COLONOSCOPY FLX DX W/COLLJ SPEC WHEN PFRMD N/A 2017    Procedure: COLONOSCOPY;  Surgeon: Mj Sevilla MD;  [facility]; Service: Gastroenterology    NE ESOPHAGOGASTRODUODENOSCOPY TRANSORAL DIAGNOSTIC N/A 2017    Procedure: ESOPHAGOGASTRODUODENOSCOPY (EGD); Surgeon: Sally Severe, MD;  Location: [facility];   Service: Gastroenterology    NE TOTAL KNEE ARTHROPLASTY Left 2020    Procedure: ARTHROPLASTY KNEE TOTAL;  Surgeon: Carol Ann Hayes DO;  Location: [address] [place] [address];  Service: Orthopedics    NE TOTAL KNEE ARTHROPLASTY Right 3/28/2022    Procedure: ARTHROPLASTY KNEE TOTAL W ROBOT - RIGHT;  Surgeon: Carol Ann Hayes DO;  Location: [address] [place] [address];  Service: Orthopedics    UPPER GASTROINTESTINAL ENDOSCOPY         Family History   Problem Relation Age of Onset    Hypertension Mother     Heart disease Father     Aneurysm Father     Coronary artery disease Father         in his 76s with aneurysm    Aortic aneurysm Father         abdominal    Scleroderma Sister     Breast cancer Sister 76    Hypertension Sister     Cancer Sister     No Known Problems Son     No Known Problems Son     Testicular cancer Son 39    Thyroid cancer Son 45    Colon cancer Maternal Aunt     Colon cancer Maternal Aunt     Breast cancer Other 48        kaylee's daughter    Alcohol abuse Neg Hx     Substance Abuse Neg Hx     Mental illness Neg Hx     Depression Neg Hx        Social History     Occupational History    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      Comment: retired   Tobacco Use    Smoking status: Former Smoker     Packs/day: 1 00     Years: 25 00     Pack years: 25 00     Types: Cigarettes     Quit date: 1980     Years since quittin 7    Smokeless tobacco: Never Used    Tobacco comment: Quit over 30 years ago; quit age 41   Vaping Use    Vaping Use: Never used   Substance and Sexual Activity    Alcohol use: Not Currently    Drug use: No    Sexual activity: Not on file         Current Outpatient Medications:    traMADol (Ultram) 50 mg tablet, Take 1 tablet (50 mg total) by mouth once as needed for moderate pain for up to 14 doses, Disp: 14 tablet, Rfl: 0    acetaminophen (TYLENOL) 325 mg tablet, Take 3 tablets (975 mg total) by mouth every 8 (eight) hours, Disp: , Rfl: 0    Calcium Acetate, Phos Binder, (CALCIUM ACETATE PO), Take by mouth daily  , Disp: , Rfl:     clobetasol (TEMOVATE) 0 05 % ointment, Apply once weekly to the affected area, Disp: 30 g, Rfl: 3    famotidine (PEPCID) 40 MG tablet, TAKE 1 TABLET BY MOUTH EVERY DAY, Disp: 90 tablet, Rfl: 1    fluticasone (FLONASE) 50 mcg/act nasal spray, 1 spray into each nostril daily, Disp: 16 mL, Rfl: 2    furosemide (LASIX) 40 mg tablet, Take one tablet twice a day x 1 week, then decrease to one tablet daily, Disp: 90 tablet, Rfl: 3    gabapentin (NEURONTIN) 800 mg tablet, Take 1 tablet (800 mg total) by mouth 4 (four) times a day, Disp: 360 tablet, Rfl: 1    loratadine (CLARITIN) 10 mg tablet, Take 10 mg by mouth daily, Disp: , Rfl:     magnesium 30 MG tablet, Take 30 mg by mouth 2 (two) times a day, Disp: , Rfl:     metoprolol tartrate (LOPRESSOR) 50 mg tablet, Take 1 5 tablets (75 mg total) by mouth 2 (two) times a day, Disp: 180 tablet, Rfl: 3    multivitamin (THERAGRAN) TABS, Take 1 tablet by mouth daily, Disp: , Rfl:     pramipexole (MIRAPEX) 0 5 mg tablet, 2 FULL TABLETS TWICE A DAY AT 5:00 P  M   AND 10:00 P M , Disp: 360 tablet, Rfl: 1    warfarin (COUMADIN) 7 5 mg tablet, TAKE 1 TABLET BY MOUTH EVERY DAY, Disp: 90 tablet, Rfl: 3    Allergies   Allergen Reactions    Latex      Added based on information entered during case entry, please review and add reactions, type, and severity as needed    Cephalexin Rash    Duloxetine Hcl Other (See Comments)     Facial pins and needles sensation    Erythromycin Rash    Levofloxacin Other (See Comments)     Muscular aches    Penicillins Rash    Savella [Milnacipran] Rash    Sulfa Antibiotics Rash Objective:  Vitals:    06/22/22 1411   BP: 118/78   Pulse: 90       Body mass index is 38 96 kg/m²  Right Knee Exam     Muscle Strength   The patient has normal right knee strength  Tenderness   The patient is experiencing tenderness in the patellar tendon  Range of Motion   Extension: 0   Flexion: 110     Tests   Varus: negative Valgus: negative  Lachman:  Anterior - negative    Posterior - negative    Other   Erythema: absent (Foot to distal thigh)  Scars: present  Sensation: normal  Pulse: present  Swelling: none  Effusion: no effusion present    Comments:  Stable at 0, 30 and 90 degrees  Neurovascularly in tact distally  No warmth or erythema           Observations     Right Knee   Negative for effusion  Physical Exam  Vitals and nursing note reviewed  Constitutional:       Appearance: She is well-developed  HENT:      Head: Normocephalic and atraumatic  Eyes:      General: No scleral icterus  Conjunctiva/sclera: Conjunctivae normal    Cardiovascular:      Rate and Rhythm: Normal rate  Pulmonary:      Effort: Pulmonary effort is normal  No respiratory distress  Musculoskeletal:      Cervical back: Normal range of motion and neck supple  Right knee: No effusion  Comments: As noted in HPI   Skin:     General: Skin is warm and dry  Neurological:      Mental Status: She is alert and oriented to person, place, and time  Psychiatric:         Behavior: Behavior normal          I have personally reviewed pertinent films in PACS  X-ray of the right knee obtained on 06/22/2022 reviewed demonstrating well-positioned and aligned total knee arthroplasty without evidence of failure  There is no new fracture, dislocation, lytic or blastic lesion

## 2022-06-23 ENCOUNTER — OFFICE VISIT (OUTPATIENT)
Dept: PHYSICAL THERAPY | Facility: CLINIC | Age: 82
End: 2022-06-23
Payer: MEDICARE

## 2022-06-23 DIAGNOSIS — G89.29 CHRONIC PAIN OF RIGHT KNEE: ICD-10-CM

## 2022-06-23 DIAGNOSIS — M17.11 PRIMARY OSTEOARTHRITIS OF RIGHT KNEE: Primary | ICD-10-CM

## 2022-06-23 DIAGNOSIS — M25.561 CHRONIC PAIN OF RIGHT KNEE: ICD-10-CM

## 2022-06-23 PROCEDURE — 97112 NEUROMUSCULAR REEDUCATION: CPT

## 2022-06-23 PROCEDURE — 97110 THERAPEUTIC EXERCISES: CPT

## 2022-06-23 NOTE — PROGRESS NOTES
Daily Note     Today's date: 2022  Patient name: Beata Raya  : 1940  MRN: [id number]  Referring provider: Benedicto Guardado DO  Dx:   Encounter Diagnosis     ICD-10-CM    1  Primary osteoarthritis of right knee  M17 11    2  Chronic pain of right knee  M25 561     G89 29                 Update 22: Pt chart to be formally d/c  She stopped in to cancel remaining visit, wants to continue w/ gym program on own  Pt has made good progress and has been a pleasure to work with  She will call or email w/ any future issues  Goals  Short Term Goals (4 weeks): ALL PROGRESSED   - Patient will be independent in basic HEP 2-3 weeks  - Patient will demonstrate >100 degrees of knee ROM for reciprocal stair negotiation  - Patient will have 0/10 pain at rest  - Patient will demonstrate >1/3 improvement in MMT grade as applicable    Long Term Goals (8 weeks): ALL PROGRESSED   - Patient will be independent in a comprehensive home exercise program  - Patient FOTO score will improve to at least 10 points higher than post op score  - Patient will ambulation with AD PRN  - Patient will independently ambulate >1000 feet (community ambulation)  - Patient will self-report >75% improvement in function    *goals extended by 2 and 4 weeks, respectively     *22: GOOD PROGRESS TOWARDS ALL GOALS AT TIME OF D/C    Subjective: Pt reports MD follow up went well  She has set up times to start w/ gym program and would like to continue at least 1x/week here while she transitions to gym program        Objective: requires UE support throughout all gentle lunge activities, corrects and is able to maintain  Assessment: Tolerated treatment well  Patient demonstrated fatigue post treatment and would benefit from continued PT   Does well w/ exercise progressions today, given handout detailing gym machines to initially avoid with  (leg ext and HS curl), and a list of safe exercises to start with (mostly those done in PT to date)  She will continue on 1x/week basis here for July w/ 2x/week gym program w/   Will transition to gym program after current set of visits barring setback  Plan: Continue per plan of care   lunge progressions, TRX work, gait progressions      Eval/ Re-eval Auth #/ Referral # Total visits Start date  Expiration date Total active units  Total manual units  PT only or  PT+OT?   3-21-22 (pre-op) NA NA NA NA NA NA NA   3-31-22 (post-op)           Re-eval 5/16, 6/17                                    Precautions: B diabetic neuropathy, DM II, L TKA August 2020    Past Medical History:   Diagnosis Date    At[facility] fibrillation ([facility] )     Carrero's esophagus     last assessed: 1/23/2018    BRCA1 negative     BRCA2 negative     Breast cancer ([facility] ) 2006    stage 1 (left), given adjuvant radiation with Arimidex x 5 years    Cancer [facility]) 2006    Left Breast, Lumpectomy    Cataract     last assessed: 3/11/2014    Dysplasia of toenail     last assessed: 8/29/2017    Esophageal reflux     GERD (gastroesophageal reflux disease)     Gross hematuria     last assessed: 2/19/2015    Hematuria     Hiatal hernia     History of radiation therapy     Hypertension     Irregular heart beat     AFIB    Mixed sensory-motor polyneuropathy     Neuropathy     Obesity     Pacemaker     Paroxysmal atrial fibrillation (HCC)     Peripheral neuropathy     Rectal bleeding     Restless leg syndrome     Shortness of breath     last assessed: 1/11/2016         Date 6/20 6/23 6/9/22 6/13 6/17 (PN)   Visit Number 19 20  16 17 18   AROM         Knee Flexion NT NT  NT NT  NT  116   Knee Extension      0            Manual         Patellar mobs         STM                           TherEx         Knee/Hip PROM   rev no Rev of HEP, POC, gym program x 5 mins during rest periods  x4 min   Active w/u         Quad set, glute set      X 20 total   Ankle pumps         SLR      SLR x15 total   Hip abduction         Knee ext: SAQ, LAQ         Heel slides Heel raises x 20-30 total Heel raises 2x30 total Heel/toe raises x 30 throughout  x30  x20       Squats at bar x20 total  Squats at bar x 20  Squat to march 2x10  Sit<>stand from one airex 2x10  Sit<>stand x 10 total     Ant and lat lunge w/ UE support  x20 each   Standing hip abd kicks yellow tband 3x10 total     Step ups 6" x20, taps x 20, up/down x 10  Step ups/down x 20 total   Step ups 6" 2x10 B    Lat 4" 2x10  Step ups 6" 2x10 B, up and down 4-6" x 6 sets  Step ups 6" x20, up/down x 10-15     exag side step x 5 laps  exag gait at bar x 2 laps  exag gait at bar x 5 laps, retro walking x 5 laps, exag side step x 4 laps  Pt defers gait progressions today   Squats x10 total     Leg press 75# 3x12  Leg press 75# 2x10, 85# 2x10  Step ups 6" 2x10 B    Step up double taps 6" 2x10 B Leg press 65# x20, 85# x10, 75# x10 l  Leg press 75# 3x10   NuStep x10 min pre lvl 3  Pre session 6 min lvl 3 NU step pre lvl 4 x 7-8 mins  lv 3 x6 min  Pre 6 mins min lvl 3 LE only   Pre x 8 min lvl 3-4    TherAct         Patient education x4 mins for POC and HEP rev  Rev x 2 mins   Rev x2-3 mins  Progress note update x 5 min   Stair negotiation         Car transfer   Standing knee flexion AROM 2 x 10   2 x 10                   6" ant step up x20, 4" ant tap down x 20, 4" lat step ups x20 --     Gait Training            side step at bar x 3-4 mins                         Modalities         CP Home  5 min  Home

## 2022-06-27 ENCOUNTER — PATIENT OUTREACH (OUTPATIENT)
Dept: INTERNAL MEDICINE CLINIC | Facility: CLINIC | Age: 82
End: 2022-06-27

## 2022-06-29 ENCOUNTER — TELEPHONE (OUTPATIENT)
Dept: PHYSICAL THERAPY | Facility: CLINIC | Age: 82
End: 2022-06-29

## 2022-06-29 NOTE — TELEPHONE ENCOUNTER
Pt had called to say that she wanted to cancel remaining visits  She is now back at gym 2x/week and felt that combo of PT and gym was too much  We agreed on one final check in, scheduled for 7/14 at 9:30 am  Barring setback we will plan for d/c at this time  Pt in agreement

## 2022-06-30 ENCOUNTER — APPOINTMENT (OUTPATIENT)
Dept: PHYSICAL THERAPY | Facility: CLINIC | Age: 82
End: 2022-06-30
Payer: MEDICARE

## 2022-06-30 DIAGNOSIS — K21.9 GASTROESOPHAGEAL REFLUX DISEASE: ICD-10-CM

## 2022-06-30 RX ORDER — FAMOTIDINE 40 MG/1
TABLET, FILM COATED ORAL
Qty: 90 TABLET | Refills: 1 | Status: SHIPPED | OUTPATIENT
Start: 2022-06-30

## 2022-07-01 ENCOUNTER — OFFICE VISIT (OUTPATIENT)
Dept: GYNECOLOGIC ONCOLOGY | Facility: CLINIC | Age: 82
End: 2022-07-01
Payer: MEDICARE

## 2022-07-01 VITALS
TEMPERATURE: 97.4 F | SYSTOLIC BLOOD PRESSURE: 118 MMHG | DIASTOLIC BLOOD PRESSURE: 84 MMHG | HEIGHT: 64 IN | WEIGHT: 225 LBS | BODY MASS INDEX: 38.41 KG/M2 | HEART RATE: 100 BPM | RESPIRATION RATE: 20 BRPM | OXYGEN SATURATION: 93 %

## 2022-07-01 DIAGNOSIS — N90.4 LICHEN SCLEROSUS ET ATROPHICUS OF THE VULVA: Primary | ICD-10-CM

## 2022-07-01 DIAGNOSIS — R31.9 HEMATURIA, UNSPECIFIED TYPE: ICD-10-CM

## 2022-07-01 PROCEDURE — 99214 OFFICE O/P EST MOD 30 MIN: CPT | Performed by: PHYSICIAN ASSISTANT

## 2022-07-01 NOTE — ASSESSMENT & PLAN NOTE
62-year-old with biopsy-proven lichen sclerosus which is well controlled with clobetasol ointment  Her clinical exam is benign  She had two, isolated episodes of "vaginal spotting"  No abnormalities on speculum exam or vulvar lesions  She is s/p hysterectomy in her 45s for fibroid uterus  Plan for UA to evaluate bladder as source of blood  Return to the office in 1 year for continued follow-up

## 2022-07-01 NOTE — PROGRESS NOTES
Assessment/Plan:    Problem List Items Addressed This Visit        Genitourinary    Lichen sclerosus et atrophicus of the vulva - Primary     82-year-old with biopsy-proven lichen sclerosus which is well controlled with clobetasol ointment  Her clinical exam is benign  She had two, isolated episodes of "vaginal spotting"  No abnormalities on speculum exam or vulvar lesions  She is s/p hysterectomy in her 45s for fibroid uterus  Plan for UA to evaluate bladder as source of blood  Return to the office in 1 year for continued follow-up  Other Visit Diagnoses     Hematuria, unspecified type        Relevant Orders    UA w Reflex to Microscopic w Reflex to Culture            CHIEF COMPLAINT:   1 year follow-up    Problem:  Biopsy-proven lichen sclerosus    Previous therapy:  Clobetasol ointment      Patient ID: Oswaldo Zapata is a 80 y o  female  who presents to the office for 1 year follow-up  She has been well in the interim  She notes her vulvar irritation is well controlled  She is using clobetasol once a week  She denies abdominal or pelvic pain  Normal bowel/bladder function  She notes two isolated episodes of blood on her underwear  This was not associated with constipation or vulvar irritation  Last episode was several weeks ago  The following portions of the patient's history were reviewed and updated as appropriate: allergies, current medications, past medical history, past surgical history and problem list     Review of Systems   Constitutional: Negative  HENT: Negative  Eyes: Negative  Respiratory: Negative  Cardiovascular: Negative  Gastrointestinal: Negative  Genitourinary: Negative  As per HPI  Musculoskeletal: Negative  Skin: Negative  Neurological: Negative  Psychiatric/Behavioral: Negative          Current Outpatient Medications   Medication Sig Dispense Refill    acetaminophen (TYLENOL) 325 mg tablet Take 3 tablets (975 mg total) by mouth every 8 (eight) hours  0    Calcium Acetate, Phos Binder, (CALCIUM ACETATE PO) Take by mouth daily        clobetasol (TEMOVATE) 0 05 % ointment Apply once weekly to the affected area 30 g 3    famotidine (PEPCID) 40 MG tablet TAKE 1 TABLET BY MOUTH EVERY DAY 90 tablet 1    fluticasone (FLONASE) 50 mcg/act nasal spray 1 spray into each nostril daily 16 mL 2    furosemide (LASIX) 40 mg tablet Take one tablet twice a day x 1 week, then decrease to one tablet daily 90 tablet 3    gabapentin (NEURONTIN) 800 mg tablet Take 1 tablet (800 mg total) by mouth 4 (four) times a day 360 tablet 1    loratadine (CLARITIN) 10 mg tablet Take 10 mg by mouth daily      magnesium 30 MG tablet Take 30 mg by mouth 2 (two) times a day      metoprolol tartrate (LOPRESSOR) 50 mg tablet Take 1 5 tablets (75 mg total) by mouth 2 (two) times a day 180 tablet 3    multivitamin (THERAGRAN) TABS Take 1 tablet by mouth daily      pramipexole (MIRAPEX) 0 5 mg tablet 2 FULL TABLETS TWICE A DAY AT 5:00 P  M  AND 10:00 P M  360 tablet 1    traMADol (Ultram) 50 mg tablet Take 1 tablet (50 mg total) by mouth once as needed for moderate pain for up to 14 doses 14 tablet 0    warfarin (COUMADIN) 7 5 mg tablet TAKE 1 TABLET BY MOUTH EVERY DAY 90 tablet 3     No current facility-administered medications for this visit  Objective:    Blood pressure 118/84, pulse 100, temperature (!) 97 4 °F (36 3 °C), resp  rate 20, height 5' 4" (1 626 m), weight 102 kg (225 lb), SpO2 93 %  Body mass index is 38 62 kg/m²  Body surface area is 2 06 meters squared  Physical Exam  Vitals reviewed  Exam conducted with a chaperone present  Constitutional:       General: She is not in acute distress  Appearance: Normal appearance  She is not ill-appearing  HENT:      Head: Normocephalic and atraumatic  Mouth/Throat:      Mouth: Mucous membranes are moist    Eyes:      General:         Right eye: No discharge  Left eye: No discharge  Conjunctiva/sclera: Conjunctivae normal    Pulmonary:      Effort: Pulmonary effort is normal    Abdominal:      Palpations: Abdomen is soft  There is no mass  Tenderness: There is no abdominal tenderness  Hernia: No hernia is present  Genitourinary:     Comments: Loss of labia minor bilaterally  The bartholin's, uretheral and skenes glands are normal  The urethral meatus is normal (midline with no lesions)  Anus without fissure or lesion  Speculum exam reveals a grossly normal vagina  No masses, lesions,discharge or bleeding  No significant cystocele or rectocele noted  Bimanual exam notes a surgical absent cervix, uterus and adnexal structures  No masses or fullness  Bladder is without fullness, mass or tenderness  Musculoskeletal:      Right lower leg: No edema  Left lower leg: No edema  Skin:     General: Skin is warm and dry  Coloration: Skin is not jaundiced  Findings: No rash  Neurological:      General: No focal deficit present  Mental Status: She is alert and oriented to person, place, and time  Cranial Nerves: No cranial nerve deficit  Sensory: No sensory deficit  Motor: No weakness  Gait: Gait normal    Psychiatric:         Mood and Affect: Mood normal          Behavior: Behavior normal          Thought Content:  Thought content normal          Judgment: Judgment normal

## 2022-07-06 ENCOUNTER — TELEPHONE (OUTPATIENT)
Dept: NEUROLOGY | Facility: CLINIC | Age: 82
End: 2022-07-06

## 2022-07-12 ENCOUNTER — OFFICE VISIT (OUTPATIENT)
Dept: NEUROLOGY | Facility: CLINIC | Age: 82
End: 2022-07-12
Payer: MEDICARE

## 2022-07-12 VITALS
SYSTOLIC BLOOD PRESSURE: 104 MMHG | BODY MASS INDEX: 38.76 KG/M2 | HEIGHT: 64 IN | HEART RATE: 80 BPM | TEMPERATURE: 96.7 F | DIASTOLIC BLOOD PRESSURE: 74 MMHG | WEIGHT: 227 LBS

## 2022-07-12 DIAGNOSIS — G31.84 MCI (MILD COGNITIVE IMPAIRMENT): ICD-10-CM

## 2022-07-12 DIAGNOSIS — G25.81 RESTLESS LEG SYNDROME: ICD-10-CM

## 2022-07-12 DIAGNOSIS — G62.9 LARGE FIBER NEUROPATHY: Primary | ICD-10-CM

## 2022-07-12 DIAGNOSIS — I48.19 PERSISTENT ATRIAL FIBRILLATION (HCC): ICD-10-CM

## 2022-07-12 PROCEDURE — 99215 OFFICE O/P EST HI 40 MIN: CPT | Performed by: PSYCHIATRY & NEUROLOGY

## 2022-07-12 NOTE — PROGRESS NOTES
Return NeuroOutpatient Note        James Hirsch  [id number]  80 y o   1940       Peripheral Neuropathy, MCI (mild cognitive impairment, Restless legs syndrome, and Tinnitus        History obtained from:  Patient     HPI/Subjective:    James Hirsch is a 81 yo F with PMH of peripheral neuropathy, MCI, A fib presents as f/u  She is former patient of Dr Bob Castro  Patient had borderline diabetes  She used to get burning and pins and needle sensation but now only gets numbness   Patient has been on gabapentin 800mg qid  she wanted to reduce dose and tried but pain was returning  When she does take 800mg qid, pain is controlled well  We tried to reduce it but then her pain returns so she couldn't tolerate it       Her most recent HgbA1c was 5 3  She goes to gym 2x/week  Her balance is better       She also suffers from RLS  She is now on mirapex 1mg bid  her symptoms are well controlled       She has chronic A fib and is on coumadin       Patient has tinnitus  She saw ENT for this but it hasn't helped  Now symptom is not as bad  She's on coumadin for A fib  She thinks she's having hard time remembering things  Her MOCA score is stable         Past Medical History:   Diagnosis Date    Arthritis     Atrial fibrillation ([facility] )     Carrero's esophagus     last assessed: 1/23/2018    BRCA1 negative     BRCA2 negative     Breast cancer ([facility] ) 2006    stage 1 (left), given adjuvant radiation with Arimidex x 5 years    Cancer [facility]) 2006    Left Breast, Lumpectomy    Cataract     last assessed: 3/11/2014    Dysplasia of toenail     last assessed: 8/29/2017    Esophageal reflux     GERD (gastroesophageal reflux disease)     Gross hematuria     last assessed: 2/19/2015    Hematuria     Hiatal hernia     History of radiation therapy     Hypertension     Irregular heart beat     AFIB    Mixed sensory-motor polyneuropathy     Neuropathy     Obesity     Pacemaker     Paroxysmal atrial fibrillation (HCC)     Peripheral neuropathy     Rectal bleeding     Restless leg syndrome     Shortness of breath     last assessed: 2016     Social History     Socioeconomic History    Marital status: /Civil Union     Spouse name: Not on file    Number of children: Not on file    Years of education: Not on file    Highest education level: Not on file   Occupational History    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      Comment: retired   Tobacco Use    Smoking status: Former Smoker     Packs/day: 1 00     Years: 25 00     Pack years: 25 00     Types: Cigarettes     Quit date: 1980     Years since quittin 8    Smokeless tobacco: Never Used    Tobacco comment: Quit over 30 years ago; quit age 41   Vaping Use    Vaping Use: Never used   Substance and Sexual Activity    Alcohol use: Not Currently    Drug use: No    Sexual activity: Not on file   Other Topics Concern    Not on file   Social History Narrative         Social Determinants of Health     Financial Resource Strain: Not on file   Food Insecurity: No Food Insecurity    Worried About [address] [place] [address] in the Last Year: Never true    Wade of Food in the Last Year: Never true   Transportation Needs: No Transportation Needs    Lack of Transportation (Medical): No    Lack of Transportation (Non-Medical):  No   Physical Activity: Not on file   Stress: Not on file   Social Connections: Not on file   Intimate Partner Violence: Not on file   Housing Stability: Low Risk     Unable to Pay for Housing in the Last Year: No    Number of Places Lived in the Last Year: 1    Unstable Housing in the Last Year: No     Family History   Problem Relation Age of Onset    Hypertension Mother     Heart disease Father     Aneurysm Father     Coronary artery disease Father         in his 76s with aneurysm    Aortic aneurysm Father         abdominal    Scleroderma Sister     Breast cancer Sister 76    Hypertension Sister     Cancer Sister     No Known Problems Son     No Known Problems Son     Testicular cancer Son 39    Thyroid cancer Son 45    Colon cancer Maternal Aunt     Colon cancer Maternal Aunt     Breast cancer Other 48        kaylee's daughter    Alcohol abuse Neg Hx     Substance Abuse Neg Hx     Mental illness Neg Hx     Depression Neg Hx      Allergies   Allergen Reactions    Latex      Added based on information entered during case entry, please review and add reactions, type, and severity as needed    Cephalexin Rash    Duloxetine Hcl Other (See Comments)     Facial pins and needles sensation    Erythromycin Rash    Levofloxacin Other (See Comments)     Muscular aches    Penicillins Rash    Savella [Milnacipran] Rash    Sulfa Antibiotics Rash     Current Outpatient Medications on File Prior to Visit   Medication Sig Dispense Refill    acetaminophen (TYLENOL) 325 mg tablet Take 3 tablets (975 mg total) by mouth every 8 (eight) hours  0    Calcium Acetate, Phos Binder, (CALCIUM ACETATE PO) Take by mouth daily        clobetasol (TEMOVATE) 0 05 % ointment Apply once weekly to the affected area 30 g 3    famotidine (PEPCID) 40 MG tablet TAKE 1 TABLET BY MOUTH EVERY DAY 90 tablet 1    fluticasone (FLONASE) 50 mcg/act nasal spray 1 spray into each nostril daily 16 mL 2    furosemide (LASIX) 40 mg tablet Take one tablet twice a day x 1 week, then decrease to one tablet daily 90 tablet 3    gabapentin (NEURONTIN) 800 mg tablet Take 1 tablet (800 mg total) by mouth 4 (four) times a day 360 tablet 1    loratadine (CLARITIN) 10 mg tablet Take 10 mg by mouth daily      magnesium 30 MG tablet Take 30 mg by mouth in the morning      metoprolol tartrate (LOPRESSOR) 50 mg tablet Take 1 5 tablets (75 mg total) by mouth 2 (two) times a day 180 tablet 3    multivitamin (THERAGRAN) TABS Take 1 tablet by mouth daily      pramipexole (MIRAPEX) 0 5 mg tablet 2 FULL TABLETS TWICE A DAY AT 5: 00 P  M  AND 10:00 P M  360 tablet 1    warfarin (COUMADIN) 7 5 mg tablet TAKE 1 TABLET BY MOUTH EVERY DAY 90 tablet 3    traMADol (Ultram) 50 mg tablet Take 1 tablet (50 mg total) by mouth once as needed for moderate pain for up to 14 doses (Patient not taking: Reported on 7/12/2022) 14 tablet 0     No current facility-administered medications on file prior to visit  Review of Systems   Refer to positive review of systems in HPI  Review of Systems    Constitutional- No fever  Eyes- No visual change  ENT- Hearing normal  CV- No chest pain  Resp- No Shortness of breath  GI- No diarrhea  - Bladder normal  MS- No Arthritis   Skin- No rash  Psych- No depression  Endo- No DM  Heme- No nodes    Vitals:    07/12/22 0959   BP: 104/74   BP Location: Right arm   Patient Position: Sitting   Cuff Size: Standard   Pulse: 80   Temp: (!) 96 7 °F (35 9 °C)   TempSrc: Tympanic   Weight: 103 kg (227 lb)   Height: 5' 4" (1 626 m)       PHYSICAL EXAM:  Appearance: No Acute Distress  Ophthalmoscopic: Disc Flat, Normal fundus  Mental status:  Orientation: Awake, Alert, and Orientedx3  Memory: MOCA: 28/30  Attention: normal  Knowledge: good  Language: No aphasia  Speech: No dysarthria  Cranial Nerves:  2 No Visual Defect on Confrontation, Pupils round, equal, reactive to light  3,4,6 Extraocular Movements Intact, no nystagmus  5 Facial Sensation Intact  7 No facial asymmetry  8 Intact hearing  9,10 Palate symmetric, normal gag  11 Good shoulder shrug  12 Tongue Midline  Gait: Stable  Extremities: right leg with mild pitting edema and venous stasis  Coordination: No ataxia with finger to nose testing, and heel to shin  Sensory:decreased to pinprick, light touch upto above knees  Absent vibratory sense in both feet   intact joint position  Muscle Tone: Normal              Muscle exam:  Arm Right Left Leg Right Left   Deltoid 5/5 5/5 Iliopsoas 5/5 5/5   Biceps 5/5 5/5 Quads 5/5 5/5   Triceps 5/5 5/5 Hamstrings 5/5 5/5   Wrist Extension 5/5 5/5 Ankle Dorsi Flexion 5/5 5/5   Wrist Flexion 5/5 5/5 Ankle Plantar Flexion 5/5 5/5   Interossei 5/5 5/5 Ankle Eversion 5/5 5/5   APB 5/5 5/5 Ankle Inversion 5/5 5/5       Reflexes   RJ BJ TJ KJ AJ Plantars Baig's   Right 2+ 2+ 2+ 1+ 0 Downgoing Not present   Left 2+ 2+ 2+ 1+ 0 Downgoing Not present     Personal review of  Labs:                  Diagnoses and all orders for this visit:      1  Large fiber neuropathy     2  Restless leg syndrome     3  MCI (mild cognitive impairment)         Patient is better overall since she started gym and does therapy there  Her neuropathic pain is well controlled as long as she takes gabapentin 800mg qid  Will resume prior dose of mirapex for RLS  Her tinnitus is not as prominent as it was previously  Her MOCA score was good  Encouraged various cognitive exercises               Total time of encounter:  40 min  More than 50% of the time was used in counseling and/or coordination of care  Extent of counseling and/or coordination of care        MD [facility]  [address]  [address]  [phone]

## 2022-07-13 RX ORDER — WARFARIN SODIUM 7.5 MG/1
TABLET ORAL
Qty: 90 TABLET | Refills: 3 | Status: SHIPPED | OUTPATIENT
Start: 2022-07-13

## 2022-07-17 DIAGNOSIS — G62.9 LARGE FIBER NEUROPATHY: ICD-10-CM

## 2022-07-17 DIAGNOSIS — G60.8 SENSORY POLYNEUROPATHY: ICD-10-CM

## 2022-07-18 RX ORDER — GABAPENTIN 800 MG/1
TABLET ORAL
Qty: 360 TABLET | Refills: 1 | Status: SHIPPED | OUTPATIENT
Start: 2022-07-18

## 2022-07-21 ENCOUNTER — ANTICOAG VISIT (OUTPATIENT)
Dept: CARDIOLOGY CLINIC | Facility: CLINIC | Age: 82
End: 2022-07-21

## 2022-07-21 ENCOUNTER — APPOINTMENT (OUTPATIENT)
Dept: LAB | Facility: HOSPITAL | Age: 82
End: 2022-07-21
Payer: MEDICARE

## 2022-07-21 DIAGNOSIS — R31.9 HEMATURIA, UNSPECIFIED TYPE: ICD-10-CM

## 2022-07-21 DIAGNOSIS — I48.20 CHRONIC ATRIAL FIBRILLATION (HCC): Primary | ICD-10-CM

## 2022-07-21 LAB
BACTERIA UR QL AUTO: ABNORMAL /HPF
BILIRUB UR QL STRIP: NEGATIVE
CLARITY UR: CLEAR
COLOR UR: YELLOW
GLUCOSE UR STRIP-MCNC: NEGATIVE MG/DL
HGB UR QL STRIP.AUTO: ABNORMAL
INR PPP: 1.86 (ref 0.84–1.19)
KETONES UR STRIP-MCNC: NEGATIVE MG/DL
LEUKOCYTE ESTERASE UR QL STRIP: ABNORMAL
NITRITE UR QL STRIP: NEGATIVE
NON-SQ EPI CELLS URNS QL MICRO: ABNORMAL /HPF
PH UR STRIP.AUTO: 5.5 [PH]
PROT UR STRIP-MCNC: ABNORMAL MG/DL
PROTHROMBIN TIME: 21.5 SECONDS (ref 11.6–14.5)
RBC #/AREA URNS AUTO: ABNORMAL /HPF
SP GR UR STRIP.AUTO: 1.02 (ref 1–1.03)
UROBILINOGEN UR QL STRIP.AUTO: 0.2 E.U./DL
WBC #/AREA URNS AUTO: ABNORMAL /HPF

## 2022-07-21 PROCEDURE — 85610 PROTHROMBIN TIME: CPT

## 2022-07-21 PROCEDURE — 87186 SC STD MICRODIL/AGAR DIL: CPT

## 2022-07-21 PROCEDURE — 36415 COLL VENOUS BLD VENIPUNCTURE: CPT

## 2022-07-21 PROCEDURE — 87086 URINE CULTURE/COLONY COUNT: CPT

## 2022-07-21 PROCEDURE — 87077 CULTURE AEROBIC IDENTIFY: CPT

## 2022-07-21 PROCEDURE — 81001 URINALYSIS AUTO W/SCOPE: CPT

## 2022-07-22 DIAGNOSIS — N39.0 URINARY TRACT INFECTION WITH HEMATURIA, SITE UNSPECIFIED: Primary | ICD-10-CM

## 2022-07-22 DIAGNOSIS — R31.9 URINARY TRACT INFECTION WITH HEMATURIA, SITE UNSPECIFIED: Primary | ICD-10-CM

## 2022-07-22 RX ORDER — NITROFURANTOIN 25; 75 MG/1; MG/1
100 CAPSULE ORAL 2 TIMES DAILY
Qty: 14 CAPSULE | Refills: 0 | Status: SHIPPED | OUTPATIENT
Start: 2022-07-22 | End: 2022-07-29

## 2022-07-23 LAB — BACTERIA UR CULT: ABNORMAL

## 2022-07-26 ENCOUNTER — OFFICE VISIT (OUTPATIENT)
Dept: PODIATRY | Facility: CLINIC | Age: 82
End: 2022-07-26
Payer: MEDICARE

## 2022-07-26 ENCOUNTER — OFFICE VISIT (OUTPATIENT)
Dept: INTERNAL MEDICINE CLINIC | Facility: CLINIC | Age: 82
End: 2022-07-26
Payer: MEDICARE

## 2022-07-26 VITALS
BODY MASS INDEX: 38.41 KG/M2 | SYSTOLIC BLOOD PRESSURE: 124 MMHG | DIASTOLIC BLOOD PRESSURE: 78 MMHG | HEIGHT: 64 IN | WEIGHT: 225 LBS | OXYGEN SATURATION: 96 % | TEMPERATURE: 97.1 F | HEART RATE: 86 BPM

## 2022-07-26 VITALS — HEIGHT: 64 IN | RESPIRATION RATE: 17 BRPM | WEIGHT: 227 LBS | BODY MASS INDEX: 38.76 KG/M2

## 2022-07-26 DIAGNOSIS — M79.671 PAIN IN BOTH FEET: ICD-10-CM

## 2022-07-26 DIAGNOSIS — Z96.651 STATUS POST TOTAL KNEE REPLACEMENT USING CEMENT, RIGHT: Primary | ICD-10-CM

## 2022-07-26 DIAGNOSIS — R60.9 CHRONIC EDEMA: ICD-10-CM

## 2022-07-26 DIAGNOSIS — L03.115 CELLULITIS OF RIGHT LOWER EXTREMITY: ICD-10-CM

## 2022-07-26 DIAGNOSIS — I70.209 PERIPHERAL ARTERIOSCLEROSIS (HCC): ICD-10-CM

## 2022-07-26 DIAGNOSIS — I87.2 DEEP VENOUS INSUFFICIENCY: ICD-10-CM

## 2022-07-26 DIAGNOSIS — L84 CORNS: ICD-10-CM

## 2022-07-26 DIAGNOSIS — M79.672 PAIN IN BOTH FEET: ICD-10-CM

## 2022-07-26 DIAGNOSIS — E11.51 TYPE 2 DIABETES MELLITUS WITH DIABETIC PERIPHERAL ANGIOPATHY WITHOUT GANGRENE, WITHOUT LONG-TERM CURRENT USE OF INSULIN (HCC): Primary | ICD-10-CM

## 2022-07-26 PROCEDURE — 99213 OFFICE O/P EST LOW 20 MIN: CPT | Performed by: INTERNAL MEDICINE

## 2022-07-26 PROCEDURE — 99213 OFFICE O/P EST LOW 20 MIN: CPT | Performed by: PODIATRIST

## 2022-07-26 NOTE — PROGRESS NOTES
Assessment/Plan:  Painful calluses   Diabetic neuropathy   Peripheral vascular disease   Chronic edema   Chronic plantar fasciitis left foot  Deep venous insufficiency         Plan   Diabetic foot exam performed   All mycotic nails debrided   Plantar calluses debrided without pain or complication   Procedures performed without pain or complication  Patient has been referred to vascular surgery for workup of deep venous insufficiency        Subjective   Patient is diabetic   She has pain with walking   She has pain with shoe wear   No history of trauma  Patient suffers from chronic edema of legs  She has had many bouts of cellulitis      Diabetic polyneuropathy associated with type 2 diabetes mellitus (HCC)     Corns     Pain in both feet     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HCC)     Chronic edema   Rule out deep venous insufficiency       Discussion/Summary  The patient was counseled regarding instructions for management,-- prognosis,-- patient and family education,-- risks and benefits of treatment options,-- importance of compliance with treatment  Patient is able to Self-Care  Possible side effects of new medications were reviewed with the patient/guardian today  The treatment plan was reviewed with the patient/guardian  The patient/guardian understands and agrees with the treatment plan      Chief Complaint  Patient has complaint of pain in her toes with ambulation   No history of trauma     History of Present Illness  HPI: Patient is doing well with Cymbalta however she began have facial tingling  She discontinued medicine   However the medication was relieving her neuropathic pain       Review of Systems     ROS reviewed       Active Problems     1  Achilles tendinitis of left lower extremity (156 71) (M76 62)   2  Acquired ankle/foot deformity (826 70) (M21 969)   3  AF (paroxysmal atrial fibrillation) (427 31) (I48 0)   4  Anticoagulant long-term use (V58 61) (Z79 01)   5  Arthritis (490 90) Patient is Current with [facility]. We will follow.   (M19 90)   6  At risk for bone density loss (V49 89) (Z91 89)   7  Atrial fibrillation (427 31) (I48 91)   8  History of Breast Cancer (V10 3)   9  Difficulty walking (719 7) (R26 2)   10  Encounter for screening mammogram for breast cancer (V76 12) (Z12 31)   11  Esophageal reflux (530 81) (K21 9)   12  Foot pain, bilateral (729 5) (M79 671,M79 672)   13  Hiatal hernia (553 3) (K44 9)   14  History of Carrero's esophagus (V12 79) (Z87 19)   15  History of fall (V15 88) (Z91 81)   16  Hypertension (401 9) (I10)   17  Leg pain, left (729 5) (Z30 848)   18  Lichen sclerosus et atrophicus (701 0) (L90 0)   19  Lumbar radiculopathy (724 4) (M54 16)   20  Multiple lung nodules (793 19) (R91 8)   21  Obesity (278 00) (E66 9)   22  Onychomycosis (110 1) (B35 1)   23  Osteopenia (733 90) (M85 80)   24  Pacemaker (V45 01) (Z95 0)   25  Peripheral neuropathy (356 9) (G62 9)   26  Pes planus of both feet (734) (M21 41,M21 42)   27  Plantar fasciitis of left foot (728 71) (M72 2)   28  Restless legs syndrome (333 94) (G25 81)     Past Medical History   · History of Abnormal glucose (790 29) (R73 09)   · Atrial fibrillation (427 31) (I48 91)   · History of Breast Cancer (V10 3)   · 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)   · Esophageal reflux (530 81) (K21 9)   · Denied: History of Exposure To STD   · History of Gross hematuria (599 71) (R31 0)   · History of Hematoma (924 9) (T14 8XXA)   · History of Hematoma of lower extremity, right, initial encounter (924 5) (S80 11XA)   · History of backache (V13 59) (Z87 39)   · History of Carrero's esophagus (V12 79) (Z87 19)   · History of cataract (V12 49) (Z86 69)   · History of chest pain (V13 89) (R72 473)   · History of fall (V15 88) (Z91 81)   · History of hematuria (V13 09) (Z87 448)   · History of postmenopausal hormone replacement therapy (V87 49) (Z92 29)   · History of shortness of breath (V13 89) (L09 083)   · History of urinary incontinence (V13 09) (V73 511)   · History of Neck pain (723 1) (M54 2)   · Normal delivery (650) (O80,Z37  9)   · History of Right knee pain (719 46) (M25 561)   · History of Ringing In The Ears (Tinnitus) (388 30)   · History of Skin rash (782 1) (R21)   · History of Traumatic blister of right lower extremity, initial encounter (916 2) (G66 681R)   · History of UTI (lower urinary tract infection) (599 0) (N39 0)     The active problems and past medical history were reviewed and updated today       Surgical History   · Denied: History of Abnormal Pap Smear Of Cervix   · History of Breast Surgery Lumpectomy   · History of Cataract Surgery   · History of Diagnostic Cystoscopy   · History of Excision Lesion Of Meniscus Or Capsule Knee   · History of Pacemaker - Pulse Generator Replacement   · History of Pacemaker Placement   · History of Pacemaker Placement   · History of Radiation Therapy   · History of Total Abdominal Hysterectomy With Removal Of Both Ovaries     The surgical history was reviewed and updated today        Family History  Mother    · Denied: Family history of Alcoholism and drug addiction in family   · Denied: Family history of Anxiety and depression  Father    · Denied: Family history of Alcoholism and drug addiction in family   · Denied: Family history of Anxiety and depression   · Family history of Coronary Artery Disease (V17 49)   · Family history of abdominal aortic aneurysm (V17 49) (Z82 49)  Child    · Denied: Family history of Alcoholism and drug addiction in family   · Denied: Family history of Anxiety and depression  Sibling    · Denied: Family history of Alcoholism and drug addiction in family   · Denied: Family history of Anxiety and depression  Sister    · Family history of Breast Cancer (V16 3)  Maternal Aunt    · Family history of Colon Cancer (V16 0)   · Family history of Colon Cancer (V16 0)  Family History    · Denied: Family history of Diabetes Mellitus   · Denied: Family history of Stroke Syndrome     The family history was reviewed and updated today        Social History      · Being A Social Drinker   · Denied: History of Drug Use   · Former smoker (V15 82) (K60 597)   · 25 pack years, quit age 41   · Marital History - Currently    · Retired From Work   · 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  The social history was reviewed and updated today       The medication list was reviewed and updated today        Allergies  1  duloxetine   2  LevoFLOXacin TABS   3  Cephalexin CAPS   4  Erythromycin Base TABS   5  Keflex TABS   6  Penicillins   7  Sulfa Drugs        Physical Exam  Left Foot: Appearance: Normal except as noted: excessive pronation-- and-- pes planus  Tenderness: None except the lisfranc joint-- and-- medial longitudinal arch  ROM: subtalar motion was restricted  Right Foot: Appearance: Normal except as noted: excessive pronation-- and-- pes planus  Tenderness: None except the lisfranc joint-- and-- medial longitudinal arch  ROM: subtalar motion was restricted   Left Ankle: ROM: limited ROM in all planes   Right Ankle: ROM: limited ROM in all planes   Neurological Exam: performed  Light touch was decreased bilaterally  Vibratory sensation was decreased in both first metatarsophalangeal joints  Deep tendon reflexes: achilles reflex absent bilateraly-- and-- 4/5 L5 testing bilateral    Vascular Exam: performed Dorsalis pedis pulses were diminished bilaterally  Posterior tibial pulses were diminished bilaterally  Dependence rubor was present bilaterally  Capillary refill time was Negative digital hair noted, but-- between 1-3 seconds bilaterally  Toenails: All of the toenails were elongated,-- hypertrophied,-- discolored-- and--   Hyperkeratosis: present on both first toes  Shoe Gear Evaluation: performed ()  Recommendation(s): SAS style       Patient's shoes and socks removed  Right Foot/Ankle   Right Foot Inspection        Nimisha Santillan  Proprioception: diminished      Vascular  Capillary refills: elevated        Left Foot/Ankle  Left Foot Inspection                    Sensory   Vibration: diminished  Proprioception: diminished     Vascular  Capillary refills: elevated      Patient's shoes and socks removed            Assign Risk Category  Deformity present  Loss of protective sensation  Weak pulses  Risk: 2

## 2022-07-26 NOTE — PROGRESS NOTES
Assessment/Plan:    Cellulitis of right lower extremity  No signs of infection  Reassured  Explained skin might not look normal due to previous infection, knee surgery  May apply petroleum jelly daily  Recommend to use long pants  Status post total knee replacement using cement, right  Completed physical therapy  Going to the gym a few days a week  Using walker  Diagnoses and all orders for this visit:    Status post total knee replacement using cement, right    Cellulitis of right lower extremity    Follow up as scheduled or as needed  Subjective:      Patient ID: Nikia Soriano is a 80 y o  female worried about leg infection  She noticed that her right leg is slightly red and shiny since a few days ago  She saw she had a small wound that scabbed over, not sure where she hit it  Denies any pain or swelling  She reports it is not as red as when she had the infection, not as painful and swollen  She feels her right leg looks different from her left leg  She had finished physical therapy, has been going to the gym regularly  She feels that this has really helped  The following portions of the patient's history were reviewed and updated as appropriate: allergies, current medications, past medical history, past social history, past surgical history and problem list     Review of Systems   Constitutional: Negative for activity change and appetite change  Respiratory: Negative for shortness of breath  Cardiovascular: Negative for chest pain  Musculoskeletal: Positive for arthralgias and gait problem  Negative for joint swelling  Skin: Positive for color change  Negative for rash  Objective:      /78   Pulse 86   Temp (!) 97 1 °F (36 2 °C)   Ht 5' 4" (1 626 m)   Wt 102 kg (225 lb)   SpO2 96%   BMI 38 62 kg/m²          Physical Exam  Constitutional:       General: She is not in acute distress  Appearance: Normal appearance  She is not ill-appearing     HENT: Head: Normocephalic and atraumatic  Eyes:      Pupils: Pupils are equal, round, and reactive to light  Cardiovascular:      Comments: (+) varicose veins  Pulmonary:      Effort: Pulmonary effort is normal  No respiratory distress  Musculoskeletal:      Right knee: No swelling, effusion or erythema  Right lower leg: No edema  Left lower leg: No edema  Legs:       Comments: Abnormal gait  Using cane   Skin:         Neurological:      General: No focal deficit present  Mental Status: She is alert and oriented to person, place, and time     Psychiatric:         Mood and Affect: Mood normal          Behavior: Behavior normal

## 2022-07-26 NOTE — ASSESSMENT & PLAN NOTE
No signs of infection  Reassured  Explained skin might not look normal due to previous infection, knee surgery  May apply petroleum jelly daily  Recommend to use long pants

## 2022-07-31 DIAGNOSIS — G25.81 RLS (RESTLESS LEGS SYNDROME): ICD-10-CM

## 2022-08-01 RX ORDER — PRAMIPEXOLE DIHYDROCHLORIDE 0.5 MG/1
TABLET ORAL
Qty: 360 TABLET | Refills: 1 | Status: SHIPPED | OUTPATIENT
Start: 2022-08-01

## 2022-08-02 NOTE — TELEPHONE ENCOUNTER
Patients GI provider:  Dr Ramos     Number to return call: [phone]    Reason for call: Pt calling stated she had colonoscopy this morning she just used the bathroom and theres dark colored on the bottom of the toilet and blood on her pad and would like to know if this is normal from the procedure       Scheduled procedure/appointment date if applicable: Apt/procedure 8/12/20
Streak of bright red blood on the pad  Yellowish tinged ouput from the prep  She had dark output  Then yellow again, then dark again  Back to yellow  Not having any bright red clots  She had 3 polyps removed today  She does not feel dizzy, lightheaded or having abdominal pain  I told her that if she were to have bright red bleeding with clots, to come to the emergency room immediately as there is a small risk of post polypectomy bleeding  She is off of her Coumadin as she has having knee surgery on Monday  She will not take her iron supplement until she has a brown bowel movement, which I hope will happen tomorrow  She will keep us updated 
Thank you Julia, agree with the recs 
No

## 2022-08-05 ENCOUNTER — ANTICOAG VISIT (OUTPATIENT)
Dept: CARDIOLOGY CLINIC | Facility: CLINIC | Age: 82
End: 2022-08-05

## 2022-08-05 ENCOUNTER — APPOINTMENT (OUTPATIENT)
Dept: LAB | Facility: HOSPITAL | Age: 82
End: 2022-08-05
Payer: MEDICARE

## 2022-08-05 DIAGNOSIS — I48.20 CHRONIC ATRIAL FIBRILLATION (HCC): Primary | ICD-10-CM

## 2022-08-09 ENCOUNTER — OFFICE VISIT (OUTPATIENT)
Dept: CARDIOLOGY CLINIC | Facility: CLINIC | Age: 82
End: 2022-08-09
Payer: MEDICARE

## 2022-08-09 VITALS
BODY MASS INDEX: 38.12 KG/M2 | HEART RATE: 88 BPM | HEIGHT: 65 IN | WEIGHT: 228.8 LBS | DIASTOLIC BLOOD PRESSURE: 82 MMHG | SYSTOLIC BLOOD PRESSURE: 118 MMHG

## 2022-08-09 DIAGNOSIS — I50.32 CHRONIC DIASTOLIC HEART FAILURE (HCC): Primary | ICD-10-CM

## 2022-08-09 PROCEDURE — 99214 OFFICE O/P EST MOD 30 MIN: CPT | Performed by: INTERNAL MEDICINE

## 2022-08-09 RX ORDER — TORSEMIDE 20 MG/1
20 TABLET ORAL DAILY
Qty: 90 TABLET | Refills: 3 | Status: SHIPPED | OUTPATIENT
Start: 2022-08-09

## 2022-08-09 NOTE — PROGRESS NOTES
Cardiology Follow Up    Jennifer Hernandes [facility]  1940  [id number]  [facility]  [address]  [address]  [address] [address] [phone]    1  Chronic diastolic heart failure (HCC)  torsemide (DEMADEX) 20 mg tablet       Interval History: Followup for chronic diastolic chf and afib, ppm    She still has dypsnea with moderate exertion and bilateral LE edema  She has a modest response to lasix  No chest pain or palpitations  She does complain of fatigue  Medical Problems             Problem List     Atrial fibrillation [facility]) [I48 [id number]]    Overview Signed 3/10/2018  8:59 AM by Meghna Burgess MD     Description: s/p 2 unsuccessful ablation, s/p 2 cardioversion last in 2010, s/p pacemaker  ; on anticoagulation followed by cardiology         Hiatal hernia    Acquired deformity of foot    Corns    Diabetic polyneuropathy associated with type 2 diabetes mellitus ([facility] )    Overview Signed 3/12/2018 12:21 PM by Meghna Burgess MD     ?duloxetine           Lab Results   Component Value Date    HGBA1C 5 3 04/28/2022         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[facility] )    Radiculopathy of lumbar region    Primary osteoarthritis of both knees    Overview Addendum 2/10/2022  9:03 AM by Meghna Burgess MD     S/p injections  L TKR         Sensory polyneuropathy    RLS (restless legs syndrome)    Arthritis    Essential hypertension    Overview Signed 12/22/2020 12:51 PM by Meghna Burgess MD     lisinopril         Lichen sclerosus et atrophicus    Multiple lung nodules    Overview Addendum 6/22/2020 10:16 AM by Meghna Burgess MD     Stable, no further CT  Last CT 2016         Severe obesity (BMI 35 0-39  9) with comorbidity ([address] )    Osteopenia of multiple sites    Peripheral neuropathy    Overview Signed 3/10/2018  8:59 AM by Meghna Burgess MD     Transitioned From: Neuropathy         Vitamin D deficiency    Dense breast tissue on mammogram    Difficulty walking    Flat foot    Onychomycosis    Carrero's esophagus with dysplasia    Overview Signed 6/3/2021  8:14 AM by Con Maher MD     EGD 7/20         Mild cognitive impairment    Pacemaker    GERD (gastroesophageal reflux disease)    Pain in both feet    Chronic edema    Deep venous insufficiency    Colon polyps    Lichen sclerosus et atrophicus of the vulva    Status post total knee replacement using cement, left    Leg swelling    Seasonal allergies    Exotropia of right eye    Stage 3a chronic kidney disease ([facility] )    Lab Results   Component Value Date    EGFR 57 06/20/2022    EGFR 64 06/06/2022    EGFR 54 04/17/2022    CREATININE 0 94 06/20/2022    CREATININE 0 86 06/06/2022    CREATININE 0 99 04/17/2022         Status post total knee replacement using cement, right    Other chest pain    Chronic anticoagulation              Past Medical History:   Diagnosis Date    Arthritis     Atrial fibrillation ([facility] )     Carrero's esophagus     last assessed: 1/23/2018    BRCA1 negative     BRCA2 negative     Breast cancer ([place] [address] ) 2006    stage 1 (left), given adjuvant radiation with Arimidex x 5 years    Cancer [facility]) 2006    Left Breast, Lumpectomy    Cataract     last assessed: 3/11/2014    Dysplasia of toenail     last assessed: 8/29/2017    Esophageal reflux     GERD (gastroesophageal reflux disease)     Gross hematuria     last assessed: 2/19/2015    Hematuria     Hiatal hernia     History of radiation therapy     Hypertension     Irregular heart beat     AFIB    Mixed sensory-motor polyneuropathy     Neuropathy     Obesity     Pacemaker     Paroxysmal atrial fibrillation (HCC)     Peripheral neuropathy     Rectal bleeding     Restless leg syndrome     Shortness of breath     last assessed: 1/11/2016     Social History     Socioeconomic History    Marital status: /Civil Union     Spouse name: Not on file    Number of children: Not on file    Years of education: Not on file    Highest education level: Not on file   Occupational History    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      Comment: retired   Tobacco Use    Smoking status: Former Smoker     Packs/day: 1 00     Years: 25 00     Pack years: 25 00     Types: Cigarettes     Quit date: 1980     Years since quittin 9    Smokeless tobacco: Never Used    Tobacco comment: Quit over 30 years ago; quit age 41   Vaping Use    Vaping Use: Never used   Substance and Sexual Activity    Alcohol use: Not Currently    Drug use: No    Sexual activity: Not on file   Other Topics Concern    Not on file   Social History Narrative         Social Determinants of Health     Financial Resource Strain: Not on file   Food Insecurity: No Food Insecurity    Worried About [address] in the Last Year: Never true    [address] in the Last Year: Never true   Transportation Needs: No Transportation Needs    Lack of Transportation (Medical): No    Lack of Transportation (Non-Medical):  No   Physical Activity: Not on file   Stress: Not on file   Social Connections: Not on file   Intimate Partner Violence: Not on file   Housing Stability: Low Risk     Unable to Pay for Housing in the Last Year: No    Number of Places Lived in the Last Year: 1    Unstable Housing in the Last Year: No      Family History   Problem Relation Age of Onset    Hypertension Mother     Heart disease Father     Aneurysm Father     Coronary artery disease Father         in his 76s with aneurysm    Aortic aneurysm Father         abdominal    Scleroderma Sister     Breast cancer Sister 76    Hypertension Sister     Cancer Sister     No Known Problems Son     No Known Problems Son     Testicular cancer Son 39    Thyroid cancer Son 45    Colon cancer Maternal Aunt     Colon cancer Maternal Aunt     Breast cancer Other 48        kaylee's daughter    Alcohol abuse Neg Hx     Substance Abuse Neg Hx     Mental illness Neg Hx     Depression Neg Hx      Past Surgical History:   Procedure Laterality Date    BREAST BIOPSY Left 2006    BREAST LUMPECTOMY Left 2006    onset: 2006    BREAST SURGERY      CARDIAC PACEMAKER PLACEMENT      x 3 2006    CATARACT EXTRACTION      COLONOSCOPY      CYSTOSCOPY  04/04/2014    diagnostic    HYSTERECTOMY      ALISON BSO; due to fibroid uterus; age 38   Preeti Pall KNEE CARTILAGE SURGERY      excision lesion of meniscus or capsule knee    KNEE SURGERY      OOPHORECTOMY Bilateral     OTHER SURGICAL HISTORY      radiation therapy    ID COLONOSCOPY FLX DX W/COLLJ SPEC WHEN PFRMD N/A 2/8/2017    Procedure: COLONOSCOPY;  Surgeon: Josh Trotter MD;  Location: [facility]; Service: Gastroenterology    ID ESOPHAGOGASTRODUODENOSCOPY TRANSORAL DIAGNOSTIC N/A 9/20/2017    Procedure: ESOPHAGOGASTRODUODENOSCOPY (EGD); Surgeon: Kalpana Trent MD;  Location: [facility];   Service: Gastroenterology    ID TOTAL KNEE ARTHROPLASTY Left 8/17/2020    Procedure: ARTHROPLASTY KNEE TOTAL;  Surgeon: Yaima Banda DO;  Location: [address] [place] [address];  Service: Orthopedics    ID TOTAL KNEE ARTHROPLASTY Right 3/28/2022    Procedure: ARTHROPLASTY KNEE TOTAL W ROBOT - RIGHT;  Surgeon: Yaima Banda DO;  Location: [address] [place] [address];  Service: Orthopedics    UPPER GASTROINTESTINAL ENDOSCOPY         Current Outpatient Medications:     acetaminophen (TYLENOL) 325 mg tablet, Take 3 tablets (975 mg total) by mouth every 8 (eight) hours, Disp: , Rfl: 0    clobetasol (TEMOVATE) 0 05 % ointment, Apply once weekly to the affected area, Disp: 30 g, Rfl: 3    famotidine (PEPCID) 40 MG tablet, TAKE 1 TABLET BY MOUTH EVERY DAY, Disp: 90 tablet, Rfl: 1    fluticasone (FLONASE) 50 mcg/act nasal spray, 1 spray into each nostril daily, Disp: 16 mL, Rfl: 2    gabapentin (NEURONTIN) 800 mg tablet, TAKE 1 TABLET BY MOUTH 4 TIMES A DAY , Disp: 360 tablet, Rfl: 1    loratadine (CLARITIN) 10 mg tablet, Take 10 mg by mouth daily, Disp: , Rfl:     magnesium 30 MG tablet, Take 30 mg by mouth in the morning, Disp: , Rfl:     metoprolol tartrate (LOPRESSOR) 50 mg tablet, Take 1 5 tablets (75 mg total) by mouth 2 (two) times a day, Disp: 180 tablet, Rfl: 3    multivitamin (THERAGRAN) TABS, Take 1 tablet by mouth daily, Disp: , Rfl:     pramipexole (MIRAPEX) 0 5 mg tablet, 2 FULL TABLETS TWICE A DAY AT 5:00 P  M   AND 10:00 P M , Disp: 360 tablet, Rfl: 1    torsemide (DEMADEX) 20 mg tablet, Take 1 tablet (20 mg total) by mouth daily, Disp: 90 tablet, Rfl: 3    warfarin (COUMADIN) 7 5 mg tablet, TAKE 1 TABLET BY MOUTH EVERY DAY, Disp: 90 tablet, Rfl: 3    Calcium Acetate, Phos Binder, (CALCIUM ACETATE PO), Take by mouth daily  , Disp: , Rfl:     traMADol (Ultram) 50 mg tablet, Take 1 tablet (50 mg total) by mouth once as needed for moderate pain for up to 14 doses (Patient not taking: No sig reported), Disp: 14 tablet, Rfl: 0  Allergies   Allergen Reactions    Latex      Added based on information entered during case entry, please review and add reactions, type, and severity as needed    Cephalexin Rash    Duloxetine Hcl Other (See Comments)     Facial pins and needles sensation    Erythromycin Rash    Levofloxacin Other (See Comments)     Muscular aches    Penicillins Rash    Savella [Milnacipran] Rash    Sulfa Antibiotics Rash       Labs:     Chemistry        Component Value Date/Time     11/09/2015 1115    K 4 0 06/20/2022 0849    K 4 5 11/09/2015 1115     06/20/2022 0849     11/09/2015 1115    CO2 33 (H) 06/20/2022 0849    CO2 30 (H) 11/09/2015 1115    BUN 21 06/20/2022 0849    BUN 20 11/09/2015 1115    CREATININE 0 94 06/20/2022 0849    CREATININE 0 8 11/09/2015 1115        Component Value Date/Time    CALCIUM 9 2 06/20/2022 0849    CALCIUM 9 0 11/09/2015 1115    ALKPHOS 69 04/17/2022 0253    ALKPHOS 61 11/09/2015 1115    AST 24 04/17/2022 0253    AST 23 11/09/2015 1115    ALT 13 04/17/2022 0253    ALT 31 11/09/2015 1115    BILITOT 0 5 11/09/2015 1115            Lab Results   Component Value Date    CHOL 162 11/09/2015     Lab Results   Component Value Date    HDL 45 10/25/2021    HDL 40 12/08/2020    HDL 35 (L) 11/14/2019     Lab Results   Component Value Date    LDLCALC 80 10/25/2021    LDLCALC 61 12/08/2020    LDLCALC 80 11/14/2019     Lab Results   Component Value Date    TRIG 69 10/25/2021    TRIG 69 12/08/2020    TRIG 127 11/14/2019     No results found for: CHOLHDL    Imaging: No results found  EKG: Atrial Fibrillation     Review of Systems   Constitutional: Positive for malaise/fatigue  HENT: Negative  Eyes: Negative  Cardiovascular: Positive for dyspnea on exertion  Respiratory: Positive for shortness of breath  Endocrine: Negative  Hematologic/Lymphatic: Negative  Skin: Negative  Musculoskeletal: Negative  Gastrointestinal: Negative  Genitourinary: Negative  Neurological: Negative  Psychiatric/Behavioral: Negative  Allergic/Immunologic: Negative  Vitals:    08/09/22 1048   BP: 118/82   Pulse: 88           Physical Exam  Vitals and nursing note reviewed  Constitutional:       Appearance: Normal appearance  HENT:      Head: Normocephalic  Nose: Nose normal       Mouth/Throat:      Mouth: Mucous membranes are moist    Eyes:      General: No scleral icterus  Conjunctiva/sclera: Conjunctivae normal    Cardiovascular:      Rate and Rhythm: Normal rate  Rhythm irregular  Heart sounds: No murmur heard  No gallop  Pulmonary:      Effort: Pulmonary effort is normal  No respiratory distress  Breath sounds: Normal breath sounds  No wheezing or rales  Abdominal:      General: Abdomen is flat  Bowel sounds are normal  There is no distension  Palpations: Abdomen is soft  Tenderness: There is no abdominal tenderness  There is no guarding  Musculoskeletal:      Cervical back: Normal range of motion and neck supple  Right lower leg: No edema  Left lower leg: No edema  Skin:     General: Skin is warm and dry  Neurological:      General: No focal deficit present  Mental Status: She is alert and oriented to person, place, and time  Psychiatric:         Mood and Affect: Mood normal          Behavior: Behavior normal          Discussion/Summary:    1  Chronic Diastolic CHF: She has dyspnea with LE edema  Will try switching lasix to torsemide and see if she responds better  She will reach out if no improvement  2  Chronic Atrial Fibrillation: Overall rate controlled  Continue Metoprolol  Stable on Coumadin      3  Hypertension: Her BP is well controlled  Continue current medical therapy       4  s/p PPM: Continue device checks  Device checks were reviewed        5  Moderate Aortic Stenosis: Will continue to follow              The patient was counseled regarding diagnostic results, instructions for management, risk factor reductions, impressions  total time of encounter was 25 minutes and 15 minutes was spent counseling

## 2022-08-17 ENCOUNTER — REMOTE DEVICE CLINIC VISIT (OUTPATIENT)
Dept: CARDIOLOGY CLINIC | Facility: CLINIC | Age: 82
End: 2022-08-17
Payer: MEDICARE

## 2022-08-17 DIAGNOSIS — Z95.0 PRESENCE OF CARDIAC PACEMAKER: Primary | ICD-10-CM

## 2022-08-17 PROCEDURE — 93296 REM INTERROG EVL PM/IDS: CPT | Performed by: INTERNAL MEDICINE

## 2022-08-17 PROCEDURE — 93294 REM INTERROG EVL PM/LDLS PM: CPT | Performed by: INTERNAL MEDICINE

## 2022-08-17 NOTE — PROGRESS NOTES
Results for orders placed or performed in visit on 08/17/22   Cardiac EP device report    Narrative    MDT-SINGLE CHAMBER PPM/ NOT MRI CONDITIONAL  CARELINK TRANSMISSION: BATTERY VOLTAGE ADEQUATE (15 MOS)  : 25 3%  ALL AVAILABLE LEAD PARAMETERS WITHIN NORMAL LIMITS  13 VHR EPISODES W/ AVAIL EGMS SHOWING RVR W/ AVG  BPM, MAX DURATION 26 SECS  PT TAKES WARFARIN, METOPROLOL TART  EF: 45% (ECHO 6/21/22)  PACEMAKER FUNCTIONING APPROPRIATELY    [address] [place] [address] Street

## 2022-09-06 ENCOUNTER — ANTICOAG VISIT (OUTPATIENT)
Dept: CARDIOLOGY CLINIC | Facility: CLINIC | Age: 82
End: 2022-09-06

## 2022-09-06 ENCOUNTER — APPOINTMENT (OUTPATIENT)
Dept: LAB | Facility: HOSPITAL | Age: 82
End: 2022-09-06
Payer: MEDICARE

## 2022-09-06 DIAGNOSIS — I48.20 CHRONIC ATRIAL FIBRILLATION (HCC): Primary | ICD-10-CM

## 2022-09-17 DIAGNOSIS — J30.2 SEASONAL ALLERGIES: ICD-10-CM

## 2022-09-17 DIAGNOSIS — I48.20 CHRONIC ATRIAL FIBRILLATION (HCC): ICD-10-CM

## 2022-09-19 RX ORDER — FLUTICASONE PROPIONATE 50 MCG
SPRAY, SUSPENSION (ML) NASAL
Qty: 16 ML | Refills: 2 | Status: SHIPPED | OUTPATIENT
Start: 2022-09-19

## 2022-09-19 RX ORDER — METOPROLOL TARTRATE 50 MG/1
TABLET, FILM COATED ORAL
Qty: 270 TABLET | Refills: 3 | Status: SHIPPED | OUTPATIENT
Start: 2022-09-19

## 2022-09-22 ENCOUNTER — APPOINTMENT (OUTPATIENT)
Dept: RADIOLOGY | Facility: CLINIC | Age: 82
End: 2022-09-22
Payer: MEDICARE

## 2022-09-22 ENCOUNTER — OFFICE VISIT (OUTPATIENT)
Dept: OBGYN CLINIC | Facility: CLINIC | Age: 82
End: 2022-09-22
Payer: MEDICARE

## 2022-09-22 VITALS
HEIGHT: 65 IN | SYSTOLIC BLOOD PRESSURE: 140 MMHG | DIASTOLIC BLOOD PRESSURE: 60 MMHG | BODY MASS INDEX: 37.49 KG/M2 | HEART RATE: 80 BPM | WEIGHT: 225 LBS

## 2022-09-22 DIAGNOSIS — Z96.651 AFTERCARE FOLLOWING RIGHT KNEE JOINT REPLACEMENT SURGERY: ICD-10-CM

## 2022-09-22 DIAGNOSIS — S76.311A STRAIN OF RIGHT HAMSTRING MUSCLE, INITIAL ENCOUNTER: ICD-10-CM

## 2022-09-22 DIAGNOSIS — Z47.1 AFTERCARE FOLLOWING RIGHT KNEE JOINT REPLACEMENT SURGERY: ICD-10-CM

## 2022-09-22 DIAGNOSIS — Z96.651 STATUS POST TOTAL RIGHT KNEE REPLACEMENT USING CEMENT: Primary | ICD-10-CM

## 2022-09-22 DIAGNOSIS — Z96.651 STATUS POST TOTAL RIGHT KNEE REPLACEMENT USING CEMENT: ICD-10-CM

## 2022-09-22 PROCEDURE — 73562 X-RAY EXAM OF KNEE 3: CPT

## 2022-09-22 PROCEDURE — 99214 OFFICE O/P EST MOD 30 MIN: CPT | Performed by: ORTHOPAEDIC SURGERY

## 2022-09-22 NOTE — PROGRESS NOTES
Assessment/Plan:  1  Status post total right knee replacement using cement  XR knee 3 vw right non injury   2  Aftercare following right knee joint replacement surgery  XR knee 3 vw right non injury   3  Strain of right hamstring muscle, initial encounter       Scribe Attestation    I,:  Mal Deng am acting as a scribe while in the presence of the attending physician :       I,:  Cruz Silvestre, DO personally performed the services described in this documentation    as scribed in my presence :         Diogenes Sharif is a pleasant 79-year-old female who returns today for follow-up evaluation 6 months status post right total knee arthroplasty  After reviewing her imaging and performing a thorough history and physical exam I explained that her posterior discomfort appears to be related to a hamstring strain, which is likely due to her exercises in the gym  I encouraged her to discontinue leg curls  I explained that the clicking she experiences is normal, especially given that increases throughout the day as she fatigues  I encouraged her to continue with her exercises and I expect this will likely continue to improve as her conditioning improves  I reassured her that there are no issues with her prosthesis and she may continue with activity to her tolerance  All of her questions and concerns were addressed today  We will see her back in 6 months for her annual visit with x-ray on arrival     Subjective: Follow-up evaluation 6 months status post right total knee arthroplasty    Patient ID: Bryn Carlson is a 80 y o  female returns today for follow-up evaluation 6 months status post right total knee arthroplasty  At today's visit, she reports that she feels like her patella is moving with ambulation  She also reports regular clicking in her knee  This clicking increases as the day goes on  She denies any pain in the knee at rest or with activity    She does experience some discomfort about the posterior aspect of her leg  She has been exercising in the gym attempting to improve her strength  She denies any new injury or trauma  Review of Systems   Constitutional: Positive for activity change  Negative for chills, fever and unexpected weight change  HENT: Negative for hearing loss, nosebleeds and sore throat  Eyes: Negative for pain, redness and visual disturbance  Respiratory: Negative for cough, shortness of breath and wheezing  Cardiovascular: Negative for chest pain, palpitations and leg swelling  Gastrointestinal: Negative for abdominal pain, nausea and vomiting  Endocrine: Negative for polydipsia and polyuria  Genitourinary: Negative for dysuria and hematuria  Musculoskeletal: Negative for arthralgias, joint swelling and myalgias  See HPI   Skin: Negative for rash and wound  Neurological: Negative for dizziness, numbness and headaches  Psychiatric/Behavioral: Negative for decreased concentration and suicidal ideas  The patient is not nervous/anxious            Past Medical History:   Diagnosis Date    Arthritis     Atrial fibrillation ([facility] )     Carrero's esophagus     last assessed: 1/23/2018    BRCA1 negative     BRCA2 negative     Breast cancer ([facility] ) 2006    stage 1 (left), given adjuvant radiation with Arimidex x 5 years    Cancer [facility]) 2006    Left Breast, Lumpectomy    Cataract     last assessed: 3/11/2014    Dysplasia of toenail     last assessed: 8/29/2017    Esophageal reflux     GERD (gastroesophageal reflux disease)     Gross hematuria     last assessed: 2/19/2015    Hematuria     Hiatal hernia     History of radiation therapy     Hypertension     Irregular heart beat     AFIB    Mixed sensory-motor polyneuropathy     Neuropathy     Obesity     Pacemaker     Paroxysmal atrial fibrillation (HCC)     Peripheral neuropathy     Rectal bleeding     Restless leg syndrome     Shortness of breath     last assessed: 1/11/2016       Past Surgical History:   Procedure Laterality Date    BREAST BIOPSY Left 2006    BREAST LUMPECTOMY Left 2006    onset: 2006    BREAST SURGERY      CARDIAC PACEMAKER PLACEMENT      x 3 2006    CATARACT EXTRACTION      COLONOSCOPY      CYSTOSCOPY  04/04/2014    diagnostic    HYSTERECTOMY      ALISON BSO; due to fibroid uterus; age 38   Paul Maya KNEE CARTILAGE SURGERY      excision lesion of meniscus or capsule knee    KNEE SURGERY      OOPHORECTOMY Bilateral     OTHER SURGICAL HISTORY      radiation therapy    [place] COLONOSCOPY FLX DX W/COLLJ SPEC WHEN PFRMD N/A 2/8/2017    Procedure: COLONOSCOPY;  Surgeon: Remedios Hughes MD;  [facility]: [facility]; Service: Gastroenterology    [place] ESOPHAGOGASTRODUODENOSCOPY TRANSORAL DIAGNOSTIC N/A 9/20/2017    Procedure: ESOPHAGOGASTRODUODENOSCOPY (EGD); Surgeon: Leigh Garcia MD;  Location: [facility];   Service: Gastroenterology    [place] TOTAL KNEE ARTHROPLASTY Left 8/17/2020    Procedure: ARTHROPLASTY KNEE TOTAL;  Surgeon: Danna Hernandez DO;  Location: [address] [place] [address];  Service: Orthopedics    [place] TOTAL KNEE ARTHROPLASTY Right 3/28/2022    Procedure: ARTHROPLASTY KNEE TOTAL W ROBOT - RIGHT;  Surgeon: Danna Hernandez DO;  Location: [address] [place] [address];  Service: Orthopedics    UPPER GASTROINTESTINAL ENDOSCOPY         Family History   Problem Relation Age of Onset    Hypertension Mother     Heart disease Father     Aneurysm Father     Coronary artery disease Father         in his 76s with aneurysm    Aortic aneurysm Father         abdominal    Scleroderma Sister     Breast cancer Sister 76    Hypertension Sister     Cancer Sister     No Known Problems Son     No Known Problems Son     Testicular cancer Son 39    Thyroid cancer Son 45    Colon cancer Maternal Aunt     Colon cancer Maternal Aunt     Breast cancer Other 48        kaylee's daughter    Alcohol abuse Neg Hx     Substance Abuse Neg Hx     Mental illness Neg Hx     Depression Neg Hx        Social History     Occupational History    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      Comment: retired   Tobacco Use    Smoking status: Former Smoker     Packs/day: 1 00     Years: 25 00     Pack years: 25 00     Types: Cigarettes     Quit date: 1980     Years since quittin 0    Smokeless tobacco: Never Used    Tobacco comment: Quit over 30 years ago; quit age 41   Vaping Use    Vaping Use: Never used   Substance and Sexual Activity    Alcohol use: Not Currently    Drug use: No    Sexual activity: Not on file         Current Outpatient Medications:     acetaminophen (TYLENOL) 325 mg tablet, Take 3 tablets (975 mg total) by mouth every 8 (eight) hours, Disp: , Rfl: 0    Calcium Acetate, Phos Binder, (CALCIUM ACETATE PO), Take by mouth daily  , Disp: , Rfl:     clobetasol (TEMOVATE) 0 05 % ointment, Apply once weekly to the affected area, Disp: 30 g, Rfl: 3    famotidine (PEPCID) 40 MG tablet, TAKE 1 TABLET BY MOUTH EVERY DAY, Disp: 90 tablet, Rfl: 1    fluticasone (FLONASE) 50 mcg/act nasal spray, SPRAY 1 SPRAY INTO EACH NOSTRIL EVERY DAY, Disp: 16 mL, Rfl: 2    gabapentin (NEURONTIN) 800 mg tablet, TAKE 1 TABLET BY MOUTH 4 TIMES A DAY , Disp: 360 tablet, Rfl: 1    loratadine (CLARITIN) 10 mg tablet, Take 10 mg by mouth daily, Disp: , Rfl:     magnesium 30 MG tablet, Take 30 mg by mouth in the morning, Disp: , Rfl:     metoprolol tartrate (LOPRESSOR) 50 mg tablet, TAKE 1 5 TABLETS BY MOUTH 2 TIMES A DAY , Disp: 270 tablet, Rfl: 3    multivitamin (THERAGRAN) TABS, Take 1 tablet by mouth daily, Disp: , Rfl:     pramipexole (MIRAPEX) 0 5 mg tablet, 2 FULL TABLETS TWICE A DAY AT 5:00 P  M   AND 10:00 P M , Disp: 360 tablet, Rfl: 1    torsemide (DEMADEX) 20 mg tablet, Take 1 tablet (20 mg total) by mouth daily, Disp: 90 tablet, Rfl: 3    traMADol (Ultram) 50 mg tablet, Take 1 tablet (50 mg total) by mouth once as needed for moderate pain for up to 14 doses (Patient not taking: No sig reported), Disp: 14 tablet, Rfl: 0   warfarin (COUMADIN) 7 5 mg tablet, TAKE 1 TABLET BY MOUTH EVERY DAY, Disp: 90 tablet, Rfl: 3    Allergies   Allergen Reactions    Latex      Added based on information entered during case entry, please review and add reactions, type, and severity as needed    Cephalexin Rash    Duloxetine Hcl Other (See Comments)     Facial pins and needles sensation    Erythromycin Rash    Levofloxacin Other (See Comments)     Muscular aches    Penicillins Rash    Savella [Milnacipran] Rash    Sulfa Antibiotics Rash       Objective:  Vitals:    09/22/22 1023   BP: 140/60   Pulse: 80       Body mass index is 38 02 kg/m²  Right Knee Exam     Muscle Strength   The patient has normal right knee strength  Tenderness   Right knee tenderness location: lateral hamstring  Range of Motion   Extension: 0   Flexion: 110     Tests   Varus: negative Valgus: negative  Lachman:  Anterior - negative    Posterior - negative    Other   Erythema: absent (Foot to distal thigh)  Scars: present  Sensation: normal  Pulse: present  Swelling: none  Effusion: no effusion present    Comments:  Stable at 0, 30 and 90 degrees  Neurovascularly in tact distally  No warmth or erythema   Patella tracks flat and midline            Observations     Right Knee   Negative for effusion  Physical Exam  Vitals and nursing note reviewed  Constitutional:       Appearance: She is well-developed  HENT:      Head: Normocephalic and atraumatic  Eyes:      General: No scleral icterus  Extraocular Movements: Extraocular movements intact  Conjunctiva/sclera: Conjunctivae normal    Cardiovascular:      Rate and Rhythm: Normal rate  Pulmonary:      Effort: Pulmonary effort is normal  No respiratory distress  Musculoskeletal:      Cervical back: Normal range of motion and neck supple  Right knee: No effusion  Comments: As noted in HPI   Skin:     General: Skin is warm and dry     Neurological:      Mental Status: She is alert and oriented to person, place, and time  Psychiatric:         Behavior: Behavior normal          I have personally reviewed pertinent films in PACS  X-ray of the right knee obtained on 09/22/2022 reviewed demonstrating well-positioned and aligned total knee arthroplasty without evidence of failure  There is no new fracture, dislocation, lytic or blastic lesion

## 2022-10-04 ENCOUNTER — OFFICE VISIT (OUTPATIENT)
Dept: PODIATRY | Facility: CLINIC | Age: 82
End: 2022-10-04
Payer: MEDICARE

## 2022-10-04 VITALS
DIASTOLIC BLOOD PRESSURE: 60 MMHG | SYSTOLIC BLOOD PRESSURE: 140 MMHG | BODY MASS INDEX: 37.49 KG/M2 | WEIGHT: 225 LBS | HEIGHT: 65 IN | RESPIRATION RATE: 17 BRPM

## 2022-10-04 DIAGNOSIS — I70.209 PERIPHERAL ARTERIOSCLEROSIS (HCC): ICD-10-CM

## 2022-10-04 DIAGNOSIS — E11.51 TYPE 2 DIABETES MELLITUS WITH DIABETIC PERIPHERAL ANGIOPATHY WITHOUT GANGRENE, WITHOUT LONG-TERM CURRENT USE OF INSULIN (HCC): Primary | ICD-10-CM

## 2022-10-04 DIAGNOSIS — M79.672 PAIN IN BOTH FEET: ICD-10-CM

## 2022-10-04 DIAGNOSIS — L84 CORNS: ICD-10-CM

## 2022-10-04 DIAGNOSIS — M79.671 PAIN IN BOTH FEET: ICD-10-CM

## 2022-10-04 PROCEDURE — 11056 PARNG/CUTG B9 HYPRKR LES 2-4: CPT | Performed by: PODIATRIST

## 2022-10-04 NOTE — PROGRESS NOTES
Assessment/Plan:  Painful calluses   Diabetic neuropathy   Peripheral vascular disease   Chronic edema   Chronic plantar fasciitis left foot   Deep venous insufficiency         Plan   Diabetic foot exam performed   All mycotic nails debrided   Plantar calluses debrided without pain or complication   Procedures performed without pain or complication   Patient has been referred to vascular surgery for workup of deep venous insufficiency        Subjective   Patient is diabetic   She has pain with walking   She has pain with shoe wear   No history of trauma   Patient suffers from chronic edema of legs   She has had many bouts of cellulitis      Diabetic polyneuropathy associated with type 2 diabetes mellitus (HCC)     Corns     Pain in both feet     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HCC)     Chronic edema   Rule out deep venous insufficiency       Discussion/Summary  The patient was counseled regarding instructions for management,-- prognosis,-- patient and family education,-- risks and benefits of treatment options,-- importance of compliance with treatment  Patient is able to Self-Care  Possible side effects of new medications were reviewed with the patient/guardian today  The treatment plan was reviewed with the patient/guardian  The patient/guardian understands and agrees with the treatment plan      Chief Complaint  Patient has complaint of pain in her toes with ambulation   No history of trauma     History of Present Illness  HPI: Patient is doing well with Cymbalta however she began have facial tingling  She discontinued medicine   However the medication was relieving her neuropathic pain       Review of Systems     ROS reviewed       Active Problems     1  Achilles tendinitis of left lower extremity (536 71) (M76 62)   2  Acquired ankle/foot deformity (036 70) (M21 969)   3  AF (paroxysmal atrial fibrillation) (427 31) (I48 0)   4  Anticoagulant long-term use (V58 61) (Z79 01)   5  Arthritis (684 90) (M19 90)   6  At risk for bone density loss (V49 89) (Z91 89)   7  Atrial fibrillation (427 31) (I48 91)   8  History of Breast Cancer (V10 3)   9  Difficulty walking (719 7) (R26 2)   10  Encounter for screening mammogram for breast cancer (V76 12) (Z12 31)   11  Esophageal reflux (530 81) (K21 9)   12  Foot pain, bilateral (729 5) (M79 671,M79 672)   13  Hiatal hernia (553 3) (K44 9)   14  History of Carrero's esophagus (V12 79) (Z87 19)   15  History of fall (V15 88) (Z91 81)   16  Hypertension (401 9) (I10)   17  Leg pain, left (729 5) (U86 231)   18  Lichen sclerosus et atrophicus (701 0) (L90 0)   19  Lumbar radiculopathy (724 4) (M54 16)   20  Multiple lung nodules (793 19) (R91 8)   21  Obesity (278 00) (E66 9)   22  Onychomycosis (110 1) (B35 1)   23  Osteopenia (733 90) (M85 80)   24  Pacemaker (V45 01) (Z95 0)   25  Peripheral neuropathy (356 9) (G62 9)   26  Pes planus of both feet (734) (M21 41,M21 42)   27  Plantar fasciitis of left foot (728 71) (M72 2)   28  Restless legs syndrome (333 94) (G25 81)     Past Medical History   · History of Abnormal glucose (790 29) (R73 09)   · Atrial fibrillation (427 31) (I48 91)   · History of Breast Cancer (V10 3)   · 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)   · Esophageal reflux (530 81) (K21 9)   · Denied: History of Exposure To STD   · History of Gross hematuria (599 71) (R31 0)   · History of Hematoma (924 9) (T14 8XXA)   · History of Hematoma of lower extremity, right, initial encounter (924 5) (S80 11XA)   · History of backache (V13 59) (Z87 39)   · History of Carrero's esophagus (V12 79) (Z87 19)   · History of cataract (V12 49) (Z86 69)   · History of chest pain (V13 89) (J19 101)   · History of fall (V15 88) (Z91 81)   · History of hematuria (V13 09) (Z87 448)   · History of postmenopausal hormone replacement therapy (V87 49) (Z92 29)   · History of shortness of breath (V13 89) (W02 047)   · History of urinary incontinence (V13 09) (J80 979)   · History of Neck pain (723 1) (M54 2)   · Normal delivery (650) (O80,Z37  9)   · History of Right knee pain (719 46) (M25 561)   · History of Ringing In The Ears (Tinnitus) (388 30)   · History of Skin rash (782 1) (R21)   · History of Traumatic blister of right lower extremity, initial encounter (916 2) (V77 559L)   · History of UTI (lower urinary tract infection) (599 0) (N39 0)     The active problems and past medical history were reviewed and updated today       Surgical History   · Denied: History of Abnormal Pap Smear Of Cervix   · History of Breast Surgery Lumpectomy   · History of Cataract Surgery   · History of Diagnostic Cystoscopy   · History of Excision Lesion Of Meniscus Or Capsule Knee   · History of Pacemaker - Pulse Generator Replacement   · History of Pacemaker Placement   · History of Pacemaker Placement   · History of Radiation Therapy   · History of Total Abdominal Hysterectomy With Removal Of Both Ovaries     The surgical history was reviewed and updated today        Family History  Mother    · Denied: Family history of Alcoholism and drug addiction in family   · Denied: Family history of Anxiety and depression  Father    · Denied: Family history of Alcoholism and drug addiction in family   · Denied: Family history of Anxiety and depression   · Family history of Coronary Artery Disease (V17 49)   · Family history of abdominal aortic aneurysm (V17 49) (Z82 49)  Child    · Denied: Family history of Alcoholism and drug addiction in family   · Denied: Family history of Anxiety and depression  Sibling    · Denied: Family history of Alcoholism and drug addiction in family   · Denied: Family history of Anxiety and depression  Sister    · Family history of Breast Cancer (V16 3)  Maternal Aunt    · Family history of Colon Cancer (V16 0)   · Family history of Colon Cancer (V16 0)  Family History    · Denied: Family history of Diabetes Mellitus   · Denied: Family history of Stroke Syndrome     The family history was reviewed and updated today        Social History      · Being A Social Drinker   · Denied: History of Drug Use   · Former smoker (V15 82) (K00 365)   · 25 pack years, quit age 41   · Marital History - Currently    · Retired From Work   · 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  The social history was reviewed and updated today       The medication list was reviewed and updated today        Allergies  1  duloxetine   2  LevoFLOXacin TABS   3  Cephalexin CAPS   4  Erythromycin Base TABS   5  Keflex TABS   6  Penicillins   7  Sulfa Drugs        Physical Exam  Left Foot: Appearance: Normal except as noted: excessive pronation-- and-- pes planus  Tenderness: None except the lisfranc joint-- and-- medial longitudinal arch  ROM: subtalar motion was restricted  Right Foot: Appearance: Normal except as noted: excessive pronation-- and-- pes planus  Tenderness: None except the lisfranc joint-- and-- medial longitudinal arch  ROM: subtalar motion was restricted   Left Ankle: ROM: limited ROM in all planes   Right Ankle: ROM: limited ROM in all planes   Neurological Exam: performed  Light touch was decreased bilaterally  Vibratory sensation was decreased in both first metatarsophalangeal joints  Deep tendon reflexes: achilles reflex absent bilateraly-- and-- 4/5 L5 testing bilateral    Vascular Exam: performed Dorsalis pedis pulses were diminished bilaterally  Posterior tibial pulses were diminished bilaterally  Dependence rubor was present bilaterally  Capillary refill time was Negative digital hair noted, but-- between 1-3 seconds bilaterally  Toenails: All of the toenails were elongated,-- hypertrophied,-- discolored-- and--   Hyperkeratosis: present on both first toes  Shoe Gear Evaluation: performed ()  Recommendation(s): SAS style       Patient's shoes and socks removed  Right Foot/Ankle   Right Foot Inspection        Lily Deleon  Proprioception: diminished      Vascular  Capillary refills: elevated        Left Foot/Ankle  Left Foot Inspection                    Sensory   Vibration: diminished  Proprioception: diminished     Vascular  Capillary refills: elevated      Patient's shoes and socks removed            Assign Risk Category  Deformity present  Loss of protective sensation  Weak pulses  Risk: 2

## 2022-10-18 ENCOUNTER — ANTICOAG VISIT (OUTPATIENT)
Dept: CARDIOLOGY CLINIC | Facility: CLINIC | Age: 82
End: 2022-10-18

## 2022-10-18 ENCOUNTER — APPOINTMENT (OUTPATIENT)
Dept: LAB | Facility: CLINIC | Age: 82
End: 2022-10-18
Payer: MEDICARE

## 2022-10-18 DIAGNOSIS — I48.20 CHRONIC ATRIAL FIBRILLATION (HCC): Primary | ICD-10-CM

## 2022-10-26 ENCOUNTER — HOSPITAL ENCOUNTER (OUTPATIENT)
Dept: MAMMOGRAPHY | Facility: HOSPITAL | Age: 82
Discharge: HOME | End: 2022-10-26
Payer: MEDICARE

## 2022-10-26 VITALS — BODY MASS INDEX: 37.47 KG/M2 | WEIGHT: 224.87 LBS | HEIGHT: 65 IN

## 2022-10-26 DIAGNOSIS — Z12.31 ENCOUNTER FOR SCREENING MAMMOGRAM FOR BREAST CANCER: ICD-10-CM

## 2022-10-26 PROCEDURE — 77063 BREAST TOMOSYNTHESIS BI: CPT

## 2022-10-26 PROCEDURE — 77067 SCR MAMMO BI INCL CAD: CPT

## 2022-11-09 ENCOUNTER — ANTICOAG VISIT (OUTPATIENT)
Dept: CARDIOLOGY CLINIC | Facility: CLINIC | Age: 82
End: 2022-11-09

## 2022-11-09 ENCOUNTER — LAB (OUTPATIENT)
Dept: LAB | Facility: HOSPITAL | Age: 82
End: 2022-11-09

## 2022-11-09 DIAGNOSIS — Z79.899 ENCOUNTER FOR LONG-TERM CURRENT USE OF MEDICATION: ICD-10-CM

## 2022-11-09 DIAGNOSIS — N18.31 STAGE 3A CHRONIC KIDNEY DISEASE (HCC): ICD-10-CM

## 2022-11-09 DIAGNOSIS — I10 ESSENTIAL HYPERTENSION: ICD-10-CM

## 2022-11-09 DIAGNOSIS — E11.42 DIABETIC POLYNEUROPATHY ASSOCIATED WITH TYPE 2 DIABETES MELLITUS (HCC): ICD-10-CM

## 2022-11-09 DIAGNOSIS — I48.20 CHRONIC ATRIAL FIBRILLATION (HCC): Primary | ICD-10-CM

## 2022-11-09 LAB
25(OH)D3 SERPL-MCNC: 64.3 NG/ML (ref 30–100)
ALBUMIN SERPL BCP-MCNC: 4.1 G/DL (ref 3.5–5)
ALP SERPL-CCNC: 88 U/L (ref 46–116)
ALT SERPL W P-5'-P-CCNC: 26 U/L (ref 12–78)
ANION GAP SERPL CALCULATED.3IONS-SCNC: 2 MMOL/L (ref 4–13)
AST SERPL W P-5'-P-CCNC: 27 U/L (ref 5–45)
BASOPHILS # BLD AUTO: 0.04 THOUSANDS/ÂΜL (ref 0–0.1)
BASOPHILS NFR BLD AUTO: 1 % (ref 0–1)
BILIRUB SERPL-MCNC: 1.1 MG/DL (ref 0.2–1)
BUN SERPL-MCNC: 25 MG/DL (ref 5–25)
CALCIUM SERPL-MCNC: 9.6 MG/DL (ref 8.3–10.1)
CHLORIDE SERPL-SCNC: 104 MMOL/L (ref 96–108)
CHOLEST SERPL-MCNC: 107 MG/DL
CO2 SERPL-SCNC: 31 MMOL/L (ref 21–32)
CREAT SERPL-MCNC: 0.89 MG/DL (ref 0.6–1.3)
EOSINOPHIL # BLD AUTO: 0 THOUSAND/ÂΜL (ref 0–0.61)
EOSINOPHIL NFR BLD AUTO: 0 % (ref 0–6)
ERYTHROCYTE [DISTWIDTH] IN BLOOD BY AUTOMATED COUNT: 14.7 % (ref 11.6–15.1)
EST. AVERAGE GLUCOSE BLD GHB EST-MCNC: 117 MG/DL
GFR SERPL CREATININE-BSD FRML MDRD: 61 ML/MIN/1.73SQ M
GLUCOSE P FAST SERPL-MCNC: 89 MG/DL (ref 65–99)
HBA1C MFR BLD: 5.7 %
HCT VFR BLD AUTO: 42.8 % (ref 34.8–46.1)
HDLC SERPL-MCNC: 35 MG/DL
HGB BLD-MCNC: 13.4 G/DL (ref 11.5–15.4)
IMM GRANULOCYTES # BLD AUTO: 0.02 THOUSAND/UL (ref 0–0.2)
IMM GRANULOCYTES NFR BLD AUTO: 0 % (ref 0–2)
LDLC SERPL CALC-MCNC: 53 MG/DL (ref 0–100)
LYMPHOCYTES # BLD AUTO: 2.05 THOUSANDS/ÂΜL (ref 0.6–4.47)
LYMPHOCYTES NFR BLD AUTO: 30 % (ref 14–44)
MCH RBC QN AUTO: 29.8 PG (ref 26.8–34.3)
MCHC RBC AUTO-ENTMCNC: 31.3 G/DL (ref 31.4–37.4)
MCV RBC AUTO: 95 FL (ref 82–98)
MONOCYTES # BLD AUTO: 0.49 THOUSAND/ÂΜL (ref 0.17–1.22)
MONOCYTES NFR BLD AUTO: 7 % (ref 4–12)
NEUTROPHILS # BLD AUTO: 4.29 THOUSANDS/ÂΜL (ref 1.85–7.62)
NEUTS SEG NFR BLD AUTO: 62 % (ref 43–75)
NONHDLC SERPL-MCNC: 72 MG/DL
NRBC BLD AUTO-RTO: 0 /100 WBCS
PLATELET # BLD AUTO: 207 THOUSANDS/UL (ref 149–390)
PMV BLD AUTO: 11.5 FL (ref 8.9–12.7)
POTASSIUM SERPL-SCNC: 4.3 MMOL/L (ref 3.5–5.3)
PROT SERPL-MCNC: 8.3 G/DL (ref 6.4–8.4)
RBC # BLD AUTO: 4.5 MILLION/UL (ref 3.81–5.12)
SODIUM SERPL-SCNC: 137 MMOL/L (ref 135–147)
TRIGL SERPL-MCNC: 94 MG/DL
TSH SERPL DL<=0.05 MIU/L-ACNC: 0.99 UIU/ML (ref 0.45–4.5)
VIT B12 SERPL-MCNC: 1014 PG/ML (ref 100–900)
WBC # BLD AUTO: 6.89 THOUSAND/UL (ref 4.31–10.16)

## 2022-11-14 ENCOUNTER — RA CDI HCC (OUTPATIENT)
Dept: OTHER | Facility: HOSPITAL | Age: 82
End: 2022-11-14

## 2022-11-14 NOTE — PROGRESS NOTES
I13 0, E11 22, E11 51  [facility] [email or web address]] 75  coding opportunities          Chart Reviewed number of suggestions sent to Provider: 3     Patients Insurance     Medicare Insurance: [employer]

## 2022-11-17 ENCOUNTER — IN-CLINIC DEVICE VISIT (OUTPATIENT)
Dept: CARDIOLOGY CLINIC | Facility: CLINIC | Age: 82
End: 2022-11-17

## 2022-11-17 DIAGNOSIS — Z95.0 PRESENCE OF CARDIAC PACEMAKER: Primary | ICD-10-CM

## 2022-11-17 NOTE — PROGRESS NOTES
Results for orders placed or performed in visit on 11/17/22   Cardiac EP device report    Narrative    MDT-SINGLE CHAMBER PPM/ NOT MRI CONDITIONAL  DEVICE INTERROGATED IN THE [place] OFFICE: BATTERY VOLTAGE NEARING ISABELA (14 MOS/<1-27 MOS)  WILL SCHEDULE MONTHLY BATTERY CHECKS   27% (VVIR 60)  ALL LEAD PARAMETERS WITHIN NORMAL LIMITS   1,836 VHR EPISODES W/ AVAIL EGMS SHOWING RVR W/ AVG  BPM  EF 45% (06/21/2022 ECHO)  HX: SAME & PT TAKES WARFARIN, METOPROLOL TART  NO PROGRAMMING CHANGES MADE TO DEVICE PARAMETERS  PACEMAKER FUNCTIONING APPROPRIATELY   ES

## 2022-11-21 ENCOUNTER — OFFICE VISIT (OUTPATIENT)
Dept: INTERNAL MEDICINE CLINIC | Facility: CLINIC | Age: 82
End: 2022-11-21

## 2022-11-21 VITALS
DIASTOLIC BLOOD PRESSURE: 80 MMHG | WEIGHT: 227 LBS | TEMPERATURE: 95.8 F | HEART RATE: 92 BPM | SYSTOLIC BLOOD PRESSURE: 124 MMHG | HEIGHT: 65 IN | BODY MASS INDEX: 37.82 KG/M2 | OXYGEN SATURATION: 96 %

## 2022-11-21 DIAGNOSIS — N18.31 STAGE 3A CHRONIC KIDNEY DISEASE (HCC): ICD-10-CM

## 2022-11-21 DIAGNOSIS — Z96.651 STATUS POST TOTAL KNEE REPLACEMENT USING CEMENT, RIGHT: ICD-10-CM

## 2022-11-21 DIAGNOSIS — Z00.00 HEALTH MAINTENANCE EXAMINATION: ICD-10-CM

## 2022-11-21 DIAGNOSIS — E55.9 VITAMIN D DEFICIENCY: ICD-10-CM

## 2022-11-21 DIAGNOSIS — K22.719 BARRETT'S ESOPHAGUS WITH DYSPLASIA: ICD-10-CM

## 2022-11-21 DIAGNOSIS — G25.81 RLS (RESTLESS LEGS SYNDROME): ICD-10-CM

## 2022-11-21 DIAGNOSIS — I10 ESSENTIAL HYPERTENSION: ICD-10-CM

## 2022-11-21 DIAGNOSIS — E11.42 DIABETIC POLYNEUROPATHY ASSOCIATED WITH TYPE 2 DIABETES MELLITUS (HCC): ICD-10-CM

## 2022-11-21 DIAGNOSIS — I48.20 CHRONIC ATRIAL FIBRILLATION (HCC): ICD-10-CM

## 2022-11-21 DIAGNOSIS — I50.32 CHRONIC DIASTOLIC CHF (CONGESTIVE HEART FAILURE) (HCC): Primary | ICD-10-CM

## 2022-11-21 DIAGNOSIS — E66.01 SEVERE OBESITY (BMI 35.0-39.9) WITH COMORBIDITY (HCC): ICD-10-CM

## 2022-11-21 DIAGNOSIS — Z23 NEED FOR INFLUENZA VACCINATION: ICD-10-CM

## 2022-11-21 DIAGNOSIS — J30.2 SEASONAL ALLERGIES: ICD-10-CM

## 2022-11-21 NOTE — ASSESSMENT & PLAN NOTE
Lab Results   Component Value Date    HGBA1C 5 7 (H) 11/09/2022     A1c slightly elevated  Not on medication

## 2022-11-21 NOTE — ASSESSMENT & PLAN NOTE
Lab Results   Component Value Date    EGFR 61 11/09/2022    EGFR 57 06/20/2022    EGFR 64 06/06/2022    CREATININE 0 89 11/09/2022    CREATININE 0 94 06/20/2022    CREATININE 0 86 06/06/2022     Stable  No NSAIDs

## 2022-11-21 NOTE — ASSESSMENT & PLAN NOTE
Wt Readings from Last 3 Encounters:   11/21/22 103 kg (227 lb)   10/26/22 102 kg (224 lb 13 9 oz)   10/04/22 102 kg (225 lb)     Taking diuretic, she will check which she is taking

## 2022-11-21 NOTE — PROGRESS NOTES
Assessment and Plan:     Problem List Items Addressed This Visit        Digestive    Carrero's esophagus with dysplasia     Taking famotidine  Endocrine    Diabetic polyneuropathy associated with type 2 diabetes mellitus ([facility] )       Lab Results   Component Value Date    HGBA1C 5 7 (H) 11/09/2022     A1c slightly elevated  Not on medication  Relevant Orders    Hemoglobin A1C       Cardiovascular and Mediastinum    Atrial fibrillation ([facility] ) [I48 91]     On metoprolol, warfarin  Per cardiology  Chronic diastolic CHF (congestive heart failure) (HCC) - Primary     Wt Readings from Last 3 Encounters:   11/21/22 103 kg (227 lb)   10/26/22 102 kg (224 lb 13 9 oz)   10/04/22 102 kg (225 lb)     Taking diuretic, she will check which she is taking  Relevant Orders    Basic metabolic panel    Essential hypertension     BP stable, on metoprolol and torsemide  Genitourinary    Stage 3a chronic kidney disease [facility])     Lab Results   Component Value Date    EGFR 61 11/09/2022    EGFR 57 06/20/2022    EGFR 64 06/06/2022    CREATININE 0 89 11/09/2022    CREATININE 0 94 06/20/2022    CREATININE 0 86 06/06/2022     Stable  No NSAIDs  Other    RLS (restless legs syndrome)     On pramipexole and gabapentin  Per neurology  Seasonal allergies     Taking daily antihistamine, may use steroid nasal spray daily  Severe obesity (BMI 35 0-39  9) with comorbidity ([facility] )     No weight change  Status post total knee replacement using cement, right     Going to the gym twice a week, encouraged to do exercises at exercise  Vitamin D deficiency     Taking D3 daily  Other Visit Diagnoses     Health maintenance examination        Flu vaccine today      Need for influenza vaccination        Relevant Orders    influenza vaccine, high-dose, PF 0 7 mL (FLUZONE HIGH-DOSE) (Completed)           Preventive health issues were discussed with patient, and age appropriate screening tests were ordered as noted in patient's After Visit Summary  Personalized health advice and appropriate referrals for health education or preventive services given if needed, as noted in patient's After Visit Summary  History of Present Illness:     Patient presents for a Medicare Wellness Visit and follow up visit  She feels well, no new complaints  She reports her right knee feels okay  She goes to gym twice a week which she feels helps with her strength and balance  She reports no recent falls  She is not sure what water pill she is taking, unsure if it is furosemide or torsemide  She reports breathing is about the same  Denies any chest pain  She remains busy at home but no regular exercise  She remains concerned about her right leg, reports the color has not returned back to normal   His skin is very dry  She has been using petroleum jelly which really helps with her skin  Patient Care Team:  Pallavi Silverman MD as PCP - Ruth Sears, MD Анна Dominguez MD Maximiano Shilling, MD Kiana Stevens, MD Shaw Hatch, MD Анна Regan MD as Endoscopist     Review of Systems:     Review of Systems   Constitutional: Negative for activity change, appetite change and fatigue  HENT: Negative for congestion, ear pain and postnasal drip  Eyes: Negative for visual disturbance  Respiratory: Positive for shortness of breath  Negative for cough, chest tightness and wheezing  Cardiovascular: Negative for chest pain and leg swelling  Gastrointestinal: Negative for abdominal pain, constipation and diarrhea  Genitourinary: Negative for dysuria and frequency  Musculoskeletal: Negative for arthralgias and myalgias  Skin: Positive for color change  Negative for rash and wound  Neurological: Negative for dizziness and headaches  Hematological: Does not bruise/bleed easily     Psychiatric/Behavioral: Negative for confusion  The patient is not nervous/anxious  Problem List:     Patient Active Problem List   Diagnosis   • Hiatal hernia   • Atrial fibrillation ([place] [address] ) [I48 91]   • Acquired deformity of foot   • Corns   • Diabetic polyneuropathy associated with type 2 diabetes mellitus ([place] [address] )   • 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[place] [address] )   • Radiculopathy of lumbar region   • Primary osteoarthritis of both knees   • Sensory polyneuropathy   • RLS (restless legs syndrome)   • Arthritis   • Essential hypertension   • Lichen sclerosus et atrophicus   • Multiple lung nodules   • Severe obesity (BMI 35 0-39  9) with comorbidity ([place] [address] )   • Osteopenia of multiple sites   • Peripheral neuropathy   • Vitamin D deficiency   • Dense breast tissue on mammogram   • Difficulty walking   • Flat foot   • Onychomycosis   • Carrero's esophagus with dysplasia   • Mild cognitive impairment   • Pacemaker   • GERD (gastroesophageal reflux disease)   • Pain in both feet   • Chronic edema   • Deep venous insufficiency   • Colon polyps   • Lichen sclerosus et atrophicus of the vulva   • Status post total knee replacement using cement, left   • Leg swelling   • Seasonal allergies   • Exotropia of right eye   • Stage 3a chronic kidney disease (HCC)   • Status post total knee replacement using cement, right   • Other chest pain   • Chronic anticoagulation   • Chronic diastolic CHF (congestive heart failure) ([place] [address] )      Past Medical and Surgical History:     Past Medical History:   Diagnosis Date   • Arthritis    • Atrial fibrillation ([place] [address] )    • Carrero's esophagus     last assessed: 1/23/2018   • BRCA1 negative    • BRCA2 negative    • Breast cancer ([place] [address] ) 2006    stage 1 (left), given adjuvant radiation with Arimidex x 5 years   • Cancer ([place] [address] ) 2006    Left Breast, Lumpectomy   • Cataract     last assessed: 3/11/2014   • Chronic diastolic CHF (congestive heart failure) ([place] [address] ) 11/21/2022   • Dysplasia of toenail     last assessed: 8/29/2017   • Esophageal reflux • GERD (gastroesophageal reflux disease)    • Gross hematuria     last assessed: 2/19/2015   • Hematuria    • Hiatal hernia    • History of radiation therapy    • Hypertension    • Irregular heart beat     AFIB   • Mixed sensory-motor polyneuropathy    • Neuropathy    • Obesity    • Pacemaker    • Paroxysmal atrial fibrillation [facility])    • Peripheral neuropathy    • Rectal bleeding    • Restless leg syndrome    • Shortness of breath     last assessed: 1/11/2016     Past Surgical History:   Procedure Laterality Date   • BREAST BIOPSY Left 2006   • BREAST LUMPECTOMY Left 2006    onset: 2006   • BREAST SURGERY     • CARDIAC PACEMAKER PLACEMENT      x 3 2006   • CATARACT EXTRACTION     • COLONOSCOPY     • CYSTOSCOPY  04/04/2014    diagnostic   • HYSTERECTOMY      ALISON BSO; due to fibroid uterus; age 38   • KNEE CARTILAGE SURGERY      excision lesion of meniscus or capsule knee   • KNEE SURGERY     • OOPHORECTOMY Bilateral     age 38   • OTHER SURGICAL HISTORY      radiation therapy   • TN COLONOSCOPY FLX DX W/COLLJ SPEC WHEN PFRMD N/A 02/08/2017    Procedure: COLONOSCOPY;  Surgeon: Mj Sevilla MD;  Location: [facility]; Service: Gastroenterology   • TN ESOPHAGOGASTRODUODENOSCOPY TRANSORAL DIAGNOSTIC N/A 09/20/2017    Procedure: ESOPHAGOGASTRODUODENOSCOPY (EGD); Surgeon: Sally Severe, MD;  Location: [facility];   Service: Gastroenterology   • TN TOTAL KNEE ARTHROPLASTY Left 08/17/2020    Procedure: ARTHROPLASTY KNEE TOTAL;  Surgeon: Carol Ann Hayes DO;  Location: [address] [place] [address];  Service: Orthopedics   • TN TOTAL KNEE ARTHROPLASTY Right 03/28/2022    Procedure: ARTHROPLASTY KNEE TOTAL W ROBOT - RIGHT;  Surgeon: Carol Ann Hayes DO;  Location: [facility];  Service: Orthopedics   • UPPER GASTROINTESTINAL ENDOSCOPY        Family History:     Family History   Problem Relation Age of Onset   • Hypertension Mother    • Heart disease Father    • Aneurysm Father    • Coronary artery disease Father         in his 76s with aneurysm   • Aortic aneurysm Father         abdominal   • Scleroderma Sister    • Breast cancer Sister 76   • Hypertension Sister    • Cancer Sister    • No Known Problems Son    • No Known Problems Son    • Testicular cancer Son 39   • Thyroid cancer Son 45   • Colon cancer Maternal Aunt    • Colon cancer Maternal Aunt    • Breast cancer Other 48        kaylee's daughter   • Alcohol abuse Neg Hx    • Substance Abuse Neg Hx    • Mental illness Neg Hx    • Depression Neg Hx       Social History:     Social History     Socioeconomic History   • Marital status: /Civil Union     Spouse name: None   • Number of children: None   • Years of education: None   • Highest education level: None   Occupational History   • 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      Comment: retired   Tobacco Use   • Smoking status: Former     Packs/day:  00     Years: 25 00     Pack years: 25      Types: Cigarettes     Quit date: 1980     Years since quittin 1   • Smokeless tobacco: Never   • Tobacco comments:     Quit over 30 years ago; quit age 41   Vaping Use   • Vaping Use: Never used   Substance and Sexual Activity   • Alcohol use: Not Currently   • Drug use: No   • Sexual activity: None   Other Topics Concern   • None   Social History Narrative         Social Determinants of Health     Financial Resource Strain: Low Risk    • Difficulty of Paying Living Expenses: Not very hard   Food Insecurity: No Food Insecurity   • Worried About Running Out of Food in the Last Year: Never true   • Ran Out of Food in the Last Year: Never true   Transportation Needs: No Transportation Needs   • Lack of Transportation (Medical): No   • Lack of Transportation (Non-Medical):  No   Physical Activity: Not on file   Stress: Not on file   Social Connections: Not on file   Intimate Partner Violence: Not on file   Housing Stability: Low Risk    • Unable to Pay for Housing in the Last Year: No   • Number of Places Lived in the Last Year: 1   • Unstable Housing in the Last Year: No      Medications and Allergies:     Current Outpatient Medications   Medication Sig Dispense Refill   • acetaminophen (TYLENOL) 325 mg tablet Take 3 tablets (975 mg total) by mouth every 8 (eight) hours  0   • Calcium Acetate, Phos Binder, (CALCIUM ACETATE PO) Take by mouth daily       • clobetasol (TEMOVATE) 0 05 % ointment Apply once weekly to the affected area 30 g 3   • famotidine (PEPCID) 40 MG tablet TAKE 1 TABLET BY MOUTH EVERY DAY 90 tablet 1   • fluticasone (FLONASE) 50 mcg/act nasal spray SPRAY 1 SPRAY INTO EACH NOSTRIL EVERY DAY 16 mL 2   • gabapentin (NEURONTIN) 800 mg tablet TAKE 1 TABLET BY MOUTH 4 TIMES A DAY  360 tablet 1   • loratadine (CLARITIN) 10 mg tablet Take 10 mg by mouth daily     • magnesium 30 MG tablet Take 30 mg by mouth in the morning     • metoprolol tartrate (LOPRESSOR) 50 mg tablet TAKE 1 5 TABLETS BY MOUTH 2 TIMES A DAY  270 tablet 3   • multivitamin (THERAGRAN) TABS Take 1 tablet by mouth daily     • pramipexole (MIRAPEX) 0 5 mg tablet 2 FULL TABLETS TWICE A DAY AT 5:00 P  M  AND 10:00 P M  360 tablet 1   • torsemide (DEMADEX) 20 mg tablet Take 1 tablet (20 mg total) by mouth daily 90 tablet 3   • traMADol (Ultram) 50 mg tablet Take 1 tablet (50 mg total) by mouth once as needed for moderate pain for up to 14 doses (Patient not taking: No sig reported) 14 tablet 0   • warfarin (COUMADIN) 7 5 mg tablet TAKE 1 TABLET BY MOUTH EVERY DAY 90 tablet 3     No current facility-administered medications for this visit       Allergies   Allergen Reactions   • Latex      Added based on information entered during case entry, please review and add reactions, type, and severity as needed   • Cephalexin Rash   • Duloxetine Hcl Other (See Comments)     Facial pins and needles sensation   • Erythromycin Rash   • Levofloxacin Other (See Comments)     Muscular aches   • Penicillins Rash   • Savella [Milnacipran] Rash   • Sulfa Antibiotics Rash Immunizations:     Immunization History   Administered Date(s) Administered   • COVID-19 MODERNA VACC 0 25 ML IM BOOSTER 12/07/2021   • COVID-19 MODERNA VACC 0 5 ML IM 01/27/2021, 03/02/2021   • Influenza Split High Dose Preservative Free IM 09/29/2016, 09/11/2017   • Influenza, high dose seasonal 0 7 mL 09/14/2018, 10/10/2020, 10/06/2021, 11/21/2022   • Pneumococcal Conjugate 13-Valent 11/11/2015   • Pneumococcal Polysaccharide PPV23 1942, 11/29/2018   • Td (adult), adsorbed 09/01/1990   • Zoster 01/01/2012   • influenza, trivalent, adjuvanted 09/12/2019      Health Maintenance:         Topic Date Due   • Hepatitis C Screening  Never done   • Colorectal Cancer Screening  08/12/2023         Topic Date Due   • Hepatitis B Vaccine (1 of 3 - 3-dose series) Never done   • COVID-19 Vaccine (4 - Booster for Tyson Do series) 04/07/2022      Medicare Screening Tests and Risk Assessments:     Fabiana Murillo is here for her Subsequent Wellness visit  Last Medicare Wellness visit information reviewed, patient interviewed and updates made to the record today  Health Risk Assessment:   Patient rates overall health as fair  Patient feels that their physical health rating is slightly worse  Patient is satisfied with their life  Eyesight was rated as slightly worse  Hearing was rated as same  Patient feels that their emotional and mental health rating is same  Patients states they are never, rarely angry  Patient states they are never, rarely unusually tired/fatigued  Pain experienced in the last 7 days has been none  Patient states that she has experienced no weight loss or gain in last 6 months  Fall Risk Screening: In the past year, patient has experienced: no history of falling in past year      Urinary Incontinence Screening:   Patient has leaked urine accidently in the last six months  Home Safety:  Patient does not have trouble with stairs inside or outside of their home   Patient has working smoke alarms and has working carbon monoxide detector  Home safety hazards include: none  Nutrition:   Current diet is Regular  Medications:   Patient is currently taking over-the-counter supplements  OTC medications include: see medication list  Patient is able to manage medications  Activities of Daily Living (ADLs)/Instrumental Activities of Daily Living (IADLs):   Walk and transfer into and out of bed and chair?: Yes  Dress and groom yourself?: Yes    Bathe or shower yourself?: Yes    Feed yourself? Yes  Do your laundry/housekeeping?: Yes  Manage your money, pay your bills and track your expenses?: Yes  Make your own meals?: Yes    Do your own shopping?: Yes    Previous Hospitalizations:   Any hospitalizations or ED visits within the last 12 months?: Yes      Advance Care Planning:   Living will: Yes    Durable POA for healthcare: Yes    Advanced directive: Yes    End of Life Decisions reviewed with patient: Yes      Cognitive Screening:   Provider or family/friend/caregiver concerned regarding cognition?: No    PREVENTIVE SCREENINGS      Cardiovascular Screening:    General: Screening Current      Diabetes Screening:     General: History Diabetes and Screening Current      Colorectal Cancer Screening:     General: Screening Current      Breast Cancer Screening:     General: History Breast Cancer and Screening Current      Cervical Cancer Screening:    General: Screening Not Indicated      Abdominal Aortic Aneurysm (AAA) Screening:        General: Screening Not Indicated      Lung Cancer Screening:     General: Screening Not Indicated      Hepatitis C Screening:    General: Screening Not Indicated    Screening, Brief Intervention, and Referral to Treatment (SBIRT)    Screening  Typical number of drinks in a day: 0  Typical number of drinks in a week: 0  Interpretation: Low risk drinking behavior      Single Item Drug Screening:  How often have you used an illegal drug (including marijuana) or a prescription medication for non-medical reasons in the past year? never    Single Item Drug Screen Score: 0  Interpretation: Negative screen for possible drug use disorder    Review of Current Opioid Use    Opioid Risk Tool (ORT) Interpretation: Complete Opioid Risk Tool (ORT)    Other Counseling Topics:   Regular weightbearing exercise  No results found  Physical Exam:     /80   Pulse 92   Temp (!) 95 8 °F (35 4 °C)   Ht 5' 4 5" (1 638 m)   Wt 103 kg (227 lb)   SpO2 96%   BMI 38 36 kg/m²     Physical Exam  Constitutional:       Appearance: She is well-developed  HENT:      Head: Normocephalic and atraumatic  Eyes:      Pupils: Pupils are equal, round, and reactive to light  Cardiovascular:      Rate and Rhythm: Normal rate and regular rhythm  Pulmonary:      Effort: Pulmonary effort is normal       Breath sounds: Normal breath sounds  Abdominal:      General: Bowel sounds are normal       Palpations: Abdomen is soft  Musculoskeletal:         General: Normal range of motion  Cervical back: Normal range of motion  Right lower leg: No edema  Left lower leg: No edema  Skin:     General: Skin is warm and dry  Findings: No rash or wound  Neurological:      General: No focal deficit present  Mental Status: She is alert and oriented to person, place, and time  Psychiatric:         Mood and Affect: Mood and affect normal          Behavior: Behavior normal           Nicolette Bucio MD     Labs & imaging results reviewed with patient

## 2022-11-23 DIAGNOSIS — I50.32 CHRONIC DIASTOLIC HEART FAILURE (HCC): ICD-10-CM

## 2022-11-23 RX ORDER — TORSEMIDE 20 MG/1
20 TABLET ORAL DAILY
Qty: 90 TABLET | Refills: 3 | Status: SHIPPED | OUTPATIENT
Start: 2022-11-23

## 2022-11-23 NOTE — TELEPHONE ENCOUNTER
Patient was supposed to discontinue Lasix 40 mg daily and start torsemide 20 mg daily on 8/9/2022  Patient did not make that change and continued taking the lasix  She realized this at the PCP's office yesterday and would like to start the torsemide 20 mg daily now in place of the lasix  Ok to fill?

## 2022-11-28 ENCOUNTER — TELEPHONE (OUTPATIENT)
Dept: INTERNAL MEDICINE CLINIC | Facility: CLINIC | Age: 82
End: 2022-11-28

## 2022-11-28 NOTE — TELEPHONE ENCOUNTER
The med that Dr Austin Ham put her on is Torsemide 20mg tab take one tab once daily  It is working better than Furosemide

## 2022-12-08 ENCOUNTER — TELEPHONE (OUTPATIENT)
Dept: CARDIOLOGY CLINIC | Facility: CLINIC | Age: 82
End: 2022-12-08

## 2022-12-08 NOTE — TELEPHONE ENCOUNTER
P/C feels much better swelling is down, breathing is definitely better  Wt today 219 00 LBS, wt is down    Only c/o is very fatigue along with a dull headache, falling asleep frequently    Pt is wondering maybe 1/2 of torsemide? Or will s/s go away over time?       Please advise

## 2022-12-12 NOTE — TELEPHONE ENCOUNTER
P/C and is feeling much better, currently is on torsemide 20 mg qd, but decided will stay on that for now  Advised pt just let us know if any changes or need anything   Pt verbally understood

## 2022-12-13 ENCOUNTER — OFFICE VISIT (OUTPATIENT)
Dept: PODIATRY | Facility: CLINIC | Age: 82
End: 2022-12-13

## 2022-12-13 VITALS
WEIGHT: 227 LBS | RESPIRATION RATE: 17 BRPM | DIASTOLIC BLOOD PRESSURE: 80 MMHG | SYSTOLIC BLOOD PRESSURE: 124 MMHG | HEIGHT: 65 IN | BODY MASS INDEX: 37.82 KG/M2

## 2022-12-13 DIAGNOSIS — M79.671 PAIN IN BOTH FEET: ICD-10-CM

## 2022-12-13 DIAGNOSIS — E11.51 TYPE 2 DIABETES MELLITUS WITH DIABETIC PERIPHERAL ANGIOPATHY WITHOUT GANGRENE, WITHOUT LONG-TERM CURRENT USE OF INSULIN (HCC): Primary | ICD-10-CM

## 2022-12-13 DIAGNOSIS — I70.209 PERIPHERAL ARTERIOSCLEROSIS (HCC): ICD-10-CM

## 2022-12-13 DIAGNOSIS — M79.672 PAIN IN BOTH FEET: ICD-10-CM

## 2022-12-13 DIAGNOSIS — L84 CORNS: ICD-10-CM

## 2022-12-13 NOTE — PROGRESS NOTES
Assessment/Plan:  Painful calluses   Diabetic neuropathy   Peripheral vascular disease   Chronic edema   Chronic plantar fasciitis left foot   Deep venous insufficiency         Plan   Diabetic foot exam performed   All mycotic nails debrided   Plantar calluses debrided without pain or complication   Procedures performed without pain or complication   Patient has been referred to vascular surgery for workup of deep venous insufficiency        Subjective   Patient is diabetic   She has pain with walking   She has pain with shoe wear   No history of trauma   Patient suffers from chronic edema of legs   She has had many bouts of cellulitis      Diabetic polyneuropathy associated with type 2 diabetes mellitus (HCC)     Corns     Pain in both feet     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HCC)     Chronic edema   Rule out deep venous insufficiency       Discussion/Summary  The patient was counseled regarding instructions for management,-- prognosis,-- patient and family education,-- risks and benefits of treatment options,-- importance of compliance with treatment  Patient is able to Self-Care  Possible side effects of new medications were reviewed with the patient/guardian today  The treatment plan was reviewed with the patient/guardian  The patient/guardian understands and agrees with the treatment plan      Chief Complaint  Patient has complaint of pain in her toes with ambulation   No history of trauma     History of Present Illness  HPI: Patient is doing well with Cymbalta however she began have facial tingling  She discontinued medicine   However the medication was relieving her neuropathic pain       Review of Systems     ROS reviewed       Active Problems     1  Achilles tendinitis of left lower extremity (306 71) (M76 62)   2  Acquired ankle/foot deformity (386 70) (M21 969)   3  AF (paroxysmal atrial fibrillation) (427 31) (I48 0)   4  Anticoagulant long-term use (V58 61) (Z79 01)   5  Arthritis (859 90) (M19 90)   6  At risk for bone density loss (V49 89) (Z91 89)   7  Atrial fibrillation (427 31) (I48 91)   8  History of Breast Cancer (V10 3)   9  Difficulty walking (719 7) (R26 2)   10  Encounter for screening mammogram for breast cancer (V76 12) (Z12 31)   11  Esophageal reflux (530 81) (K21 9)   12  Foot pain, bilateral (729 5) (M79 671,M79 672)   13  Hiatal hernia (553 3) (K44 9)   14  History of Carrero's esophagus (V12 79) (Z87 19)   15  History of fall (V15 88) (Z91 81)   16  Hypertension (401 9) (I10)   17  Leg pain, left (729 5) (R41 274)   18  Lichen sclerosus et atrophicus (701 0) (L90 0)   19  Lumbar radiculopathy (724 4) (M54 16)   20  Multiple lung nodules (793 19) (R91 8)   21  Obesity (278 00) (E66 9)   22  Onychomycosis (110 1) (B35 1)   23  Osteopenia (733 90) (M85 80)   24  Pacemaker (V45 01) (Z95 0)   25  Peripheral neuropathy (356 9) (G62 9)   26  Pes planus of both feet (734) (M21 41,M21 42)   27  Plantar fasciitis of left foot (728 71) (M72 2)   28  Restless legs syndrome (333 94) (G25 81)     Past Medical History   · History of Abnormal glucose (790 29) (R73 09)   · Atrial fibrillation (427 31) (I48 91)   · History of Breast Cancer (V10 3)   · 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)   · Esophageal reflux (530 81) (K21 9)   · Denied: History of Exposure To STD   · History of Gross hematuria (599 71) (R31 0)   · History of Hematoma (924 9) (T14 8XXA)   · History of Hematoma of lower extremity, right, initial encounter (924 5) (S80 11XA)   · History of backache (V13 59) (Z87 39)   · History of Carrero's esophagus (V12 79) (Z87 19)   · History of cataract (V12 49) (Z86 69)   · History of chest pain (V13 89) (G51 176)   · History of fall (V15 88) (Z91 81)   · History of hematuria (V13 09) (Z87 448)   · History of postmenopausal hormone replacement therapy (V87 49) (Z92 29)   · History of shortness of breath (V13 89) (U62 385)   · History of urinary incontinence (V13 09) (Y37 050)   · History of Neck pain (723 1) (M54 2)   · Normal delivery (650) (O80,Z37  9)   · History of Right knee pain (719 46) (M25 561)   · History of Ringing In The Ears (Tinnitus) (388 30)   · History of Skin rash (782 1) (R21)   · History of Traumatic blister of right lower extremity, initial encounter (916 2) (Y20 572B)   · History of UTI (lower urinary tract infection) (599 0) (N39 0)     The active problems and past medical history were reviewed and updated today       Surgical History   · Denied: History of Abnormal Pap Smear Of Cervix   · History of Breast Surgery Lumpectomy   · History of Cataract Surgery   · History of Diagnostic Cystoscopy   · History of Excision Lesion Of Meniscus Or Capsule Knee   · History of Pacemaker - Pulse Generator Replacement   · History of Pacemaker Placement   · History of Pacemaker Placement   · History of Radiation Therapy   · History of Total Abdominal Hysterectomy With Removal Of Both Ovaries     The surgical history was reviewed and updated today        Family History  Mother    · Denied: Family history of Alcoholism and drug addiction in family   · Denied: Family history of Anxiety and depression  Father    · Denied: Family history of Alcoholism and drug addiction in family   · Denied: Family history of Anxiety and depression   · Family history of Coronary Artery Disease (V17 49)   · Family history of abdominal aortic aneurysm (V17 49) (Z82 49)  Child    · Denied: Family history of Alcoholism and drug addiction in family   · Denied: Family history of Anxiety and depression  Sibling    · Denied: Family history of Alcoholism and drug addiction in family   · Denied: Family history of Anxiety and depression  Sister    · Family history of Breast Cancer (V16 3)  Maternal Aunt    · Family history of Colon Cancer (V16 0)   · Family history of Colon Cancer (V16 0)  Family History    · Denied: Family history of Diabetes Mellitus   · Denied: Family history of Stroke Syndrome     The family history was reviewed and updated today        Social History      · Being A Social Drinker   · Denied: History of Drug Use   · Former smoker (V15 82) (X68 048)   · 25 pack years, quit age 41   · Marital History - Currently    · Retired From Work   · 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  The social history was reviewed and updated today       The medication list was reviewed and updated today        Allergies  1  duloxetine   2  LevoFLOXacin TABS   3  Cephalexin CAPS   4  Erythromycin Base TABS   5  Keflex TABS   6  Penicillins   7  Sulfa Drugs        Physical Exam  Left Foot: Appearance: Normal except as noted: excessive pronation-- and-- pes planus  Tenderness: None except the lisfranc joint-- and-- medial longitudinal arch  ROM: subtalar motion was restricted  Right Foot: Appearance: Normal except as noted: excessive pronation-- and-- pes planus  Tenderness: None except the lisfranc joint-- and-- medial longitudinal arch  ROM: subtalar motion was restricted   Left Ankle: ROM: limited ROM in all planes   Right Ankle: ROM: limited ROM in all planes   Neurological Exam: performed  Light touch was decreased bilaterally  Vibratory sensation was decreased in both first metatarsophalangeal joints  Deep tendon reflexes: achilles reflex absent bilateraly-- and-- 4/5 L5 testing bilateral    Vascular Exam: performed Dorsalis pedis pulses were diminished bilaterally  Posterior tibial pulses were diminished bilaterally  Dependence rubor was present bilaterally  Capillary refill time was Negative digital hair noted, but-- between 1-3 seconds bilaterally  Toenails: All of the toenails were elongated,-- hypertrophied,-- discolored-- and--   Hyperkeratosis: present on both first toes  Shoe Gear Evaluation: performed ()  Recommendation(s): SAS style       Patient's shoes and socks removed  Right Foot/Ankle   Right Foot Inspection        Erinn Cobian  Proprioception: diminished      Vascular  Capillary refills: elevated        Left Foot/Ankle  Left Foot Inspection                    Sensory   Vibration: diminished  Proprioception: diminished     Vascular  Capillary refills: elevated      Patient's shoes and socks removed            Assign Risk Category  Deformity present  Loss of protective sensation  Weak pulses  Risk: 2

## 2022-12-15 DIAGNOSIS — J30.2 SEASONAL ALLERGIES: ICD-10-CM

## 2022-12-16 RX ORDER — FLUTICASONE PROPIONATE 50 MCG
SPRAY, SUSPENSION (ML) NASAL
Qty: 16 ML | Refills: 2 | Status: SHIPPED | OUTPATIENT
Start: 2022-12-16

## 2022-12-19 ENCOUNTER — REMOTE DEVICE CLINIC VISIT (OUTPATIENT)
Dept: CARDIOLOGY CLINIC | Facility: CLINIC | Age: 82
End: 2022-12-19

## 2022-12-19 DIAGNOSIS — Z95.0 PRESENCE OF CARDIAC PACEMAKER: Primary | ICD-10-CM

## 2022-12-19 NOTE — PROGRESS NOTES
Results for orders placed or performed in visit on 12/19/22   Cardiac EP device report    Narrative    MDT-SINGLE CHAMBER PPM/ NOT MRI CONDITIONAL  NON-BILLABLE CARELINK TRANSMISSION: BATTERY STATUS: BATTERY VOLTAGE NEARING ISABELA (12 MOS~<1-25 MOS)  WILL SCHEDULE MONTHLY BATTERY CHECKS  : 26 5%  ALL AVAILABLE LEAD PARAMETERS WITHIN NORMAL LIMITS  4VHR EPISODES W /EGMS/MARKERS SHOWING AF/RVR W/ AVG  BPM, MAX DURATION 57 SECS  PT TAKES METOPROLOL TART, WARFARIN  EF: 45% (ECHO 6/21/22)  PACEMAKER FUNCTIONING APPROPRIATELY    [address] [place] [address]

## 2022-12-21 ENCOUNTER — APPOINTMENT (OUTPATIENT)
Dept: LAB | Facility: HOSPITAL | Age: 82
End: 2022-12-21

## 2022-12-21 DIAGNOSIS — K21.9 GASTROESOPHAGEAL REFLUX DISEASE: ICD-10-CM

## 2022-12-21 RX ORDER — FAMOTIDINE 40 MG/1
TABLET, FILM COATED ORAL
Qty: 90 TABLET | Refills: 1 | Status: SHIPPED | OUTPATIENT
Start: 2022-12-21

## 2022-12-22 ENCOUNTER — ANTICOAG VISIT (OUTPATIENT)
Dept: CARDIOLOGY CLINIC | Facility: CLINIC | Age: 82
End: 2022-12-22

## 2022-12-22 DIAGNOSIS — I48.20 CHRONIC ATRIAL FIBRILLATION (HCC): Primary | ICD-10-CM

## 2022-12-23 NOTE — PROGRESS NOTES
Assessment/Plan:  Painful calluses   Diabetic neuropathy   Peripheral vascular disease   Chronic edema   Chronic plantar fasciitis left foot         Plan   Diabetic foot exam performed   All mycotic nails debrided   Plantar calluses debrided without pain or complication  Procedures performed without pain or complication         Subjective   Patient is diabetic   She has pain with walking   She has pain with shoe wear   No history of trauma     Diabetic polyneuropathy associated with type 2 diabetes mellitus (HCC)     Corns     Pain in both feet     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HCC)     Chronic edema   Rule out deep venous insufficiency       Discussion/Summary  The patient was counseled regarding instructions for management,-- prognosis,-- patient and family education,-- risks and benefits of treatment options,-- importance of compliance with treatment  Patient is able to Self-Care  Possible side effects of new medications were reviewed with the patient/guardian today  The treatment plan was reviewed with the patient/guardian  The patient/guardian understands and agrees with the treatment plan      Chief Complaint  Patient has complaint of pain in her toes with ambulation   No history of trauma     History of Present Illness  HPI: Patient is doing well with Cymbalta however she began have facial tingling  She discontinued medicine   However the medication was relieving her neuropathic pain       Review of Systems     ROS reviewed       Active Problems     1  Achilles tendinitis of left lower extremity (726 71) (M76 62)   2  Acquired ankle/foot deformity (736 70) (M21 969)   3  AF (paroxysmal atrial fibrillation) (427 31) (I48 0)   4  Anticoagulant long-term use (V58 61) (Z79 01)   5  Arthritis (716 90) (M19 90)   6  At risk for bone density loss (V49 89) (Z91 89)   7  Atrial fibrillation (427 31) (I48 91)   8  History of Breast Cancer (V10 3)   9  Difficulty walking (719 7) (R26 2)   10  Encounter for screening mammogram for breast cancer (V76 12) (Z12 31)   11  Esophageal reflux (530 81) (K21 9)   12  Foot pain, bilateral (729 5) (M79 671,M79 672)   13  Hiatal hernia (553 3) (K44 9)   14  History of Carrero's esophagus (V12 79) (Z87 19)   15  History of fall (V15 88) (Z91 81)   16  Hypertension (401 9) (I10)   17  Leg pain, left (729 5) (S56 837)   18  Lichen sclerosus et atrophicus (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 0) (L90 0)   19  Lumbar radiculopathy (724 4) (M54 16)   20  Multiple lung nodules (793 19) (R91 8)   21  Obesity (278 00) (E66 9)   22  Onychomycosis (110 1) (B35 1)   23  Osteopenia (733 90) (M85 80)   24  Pacemaker (V45 01) (Z95 0)   25  Peripheral neuropathy (356 9) (G62 9)   26  Pes planus of both feet (734) (M21 41,M21 42)   27  Plantar fasciitis of left foot (728 71) (M72 2)   28  Restless legs syndrome (333 94) (G25 81)     Past Medical History   · History of Abnormal glucose (790 29) (R73 09)   · Atrial fibrillation (427 31) (I48 91)   · History of Breast Cancer (V10 3)   · 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)   · Esophageal reflux (530 81) (K21 9)   · Denied: History of Exposure To STD   · History of Gross hematuria (599 71) (R31 0)   · History of Hematoma (924 9) (T14 8XXA)   · History of Hematoma of lower extremity, right, initial encounter (924 5) (S80 11XA)   · History of backache (V13 59) (Z87 39)   · History of Carrero's esophagus (V12 79) (Z87 19)   · History of cataract (V12 49) (Z86 69)   · History of chest pain (V13 89) (Z87 898)   · History of fall (V15 88) (Z91 81)   · History of hematuria (V13 09) (Z87 448)   · History of postmenopausal hormone replacement therapy (V87 49) (Z92 29)   · History of shortness of breath (V13 89) (D40 273)   · History of urinary incontinence (V13 09) (Z87 898)   · History of Neck pain (723 1) (M54 2)   · Normal delivery (650) (O80,Z37  9)   · History of Right knee pain (719 46) (M25 561)   · History of Ringing In The Ears (Tinnitus) (388 30)   · History of Skin rash (782 1) (R21)   · History of Traumatic blister of right lower extremity, initial encounter (916 2) (I34 961Z)   · History of UTI (lower urinary tract infection) (599 0) (N39 0)     The active problems and past medical history were reviewed and updated today       Surgical History   · Denied: History of Abnormal Pap Smear Of Cervix   · History of Breast Surgery Lumpectomy   · History of Cataract Surgery   · History of Diagnostic Cystoscopy   · History of Excision Lesion Of Meniscus Or Capsule Knee   · History of Pacemaker - Pulse Generator Replacement   · History of Pacemaker Placement   · History of Pacemaker Placement   · History of Radiation Therapy   · History of Total Abdominal Hysterectomy With Removal Of Both Ovaries     The surgical history was reviewed and updated today        Family History  Mother    · Denied: Family history of Alcoholism and drug addiction in family   · Denied: Family history of Anxiety and depression  Father    · Denied: Family history of Alcoholism and drug addiction in family   · Denied: Family history of Anxiety and depression   · Family history of Coronary Artery Disease (V17 49)   · Family history of abdominal aortic aneurysm (V17 49) (Z82 49)  Child    · Denied: Family history of Alcoholism and drug addiction in family   · Denied: Family history of Anxiety and depression  Sibling    · Denied: Family history of Alcoholism and drug addiction in family   · Denied: Family history of Anxiety and depression  Sister    · Family history of Breast Cancer (V16 3)  Maternal Aunt    · Family history of Colon Cancer (V16 0)   · Family history of Colon Cancer (V16 0)  Family History    · Denied: Family history of Diabetes Mellitus   · Denied: Family history of Stroke Syndrome     The family history was reviewed and updated today        Social History      · Being A Social Drinker   · Denied: History of Drug Use   · Former smoker (V15 82) (T74 625)   · 25 pack years, quit age 41   · Marital History - Currently    · Retired From Work   · 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  The social history was reviewed and updated today       The medication list was reviewed and updated today        Allergies  1  duloxetine   2  LevoFLOXacin TABS   3  Cephalexin CAPS   4  Erythromycin Base TABS   5  Keflex TABS   6  Penicillins   7  Sulfa Drugs        Physical Exam  Left Foot: Appearance: Normal except as noted: excessive pronation-- and-- pes planus  Tenderness: None except the lisfranc joint-- and-- medial longitudinal arch  ROM: subtalar motion was restricted  Right Foot: Appearance: Normal except as noted: excessive pronation-- and-- pes planus  Tenderness: None except the lisfranc joint-- and-- medial longitudinal arch  ROM: subtalar motion was restricted   Left Ankle: ROM: limited ROM in all planes   Right Ankle: ROM: limited ROM in all planes   Neurological Exam: performed  Light touch was decreased bilaterally  Vibratory sensation was decreased in both first metatarsophalangeal joints  Deep tendon reflexes: achilles reflex absent bilateraly-- and-- 4/5 L5 testing bilateral    Vascular Exam: performed Dorsalis pedis pulses were diminished bilaterally  Posterior tibial pulses were diminished bilaterally  Dependence rubor was present bilaterally  Capillary refill time was Negative digital hair noted, but-- between 1-3 seconds bilaterally  Toenails: All of the toenails were elongated,-- hypertrophied,-- discolored-- and--   Hyperkeratosis: present on both first toes  Shoe Gear Evaluation: performed ()  Recommendation(s): SAS style       Patient's shoes and socks removed  Right Foot/Ankle   Right Foot Inspection        Sensory   Vibration: diminished  Proprioception: diminished      Vascular  Capillary refills: elevated        Left Foot/Ankle  Left Foot Inspection                    Sensory   Vibration: diminished  Proprioception: diminished     Vascular  Capillary refills: elevated      Patient's shoes and socks removed  Assign Risk Category:  Deformity present; Loss of protective sensation; Weak pulses       Risk: 2       Odomzo Counseling- I discussed with the patient the risks of Odomzo including but not limited to nausea, vomiting, diarrhea, constipation, weight loss, changes in the sense of taste, decreased appetite, muscle spasms, and hair loss.  The patient verbalized understanding of the proper use and possible adverse effects of Odomzo.  All of the patient's questions and concerns were addressed.

## 2023-01-09 DIAGNOSIS — G60.8 SENSORY POLYNEUROPATHY: ICD-10-CM

## 2023-01-09 DIAGNOSIS — G62.9 LARGE FIBER NEUROPATHY: ICD-10-CM

## 2023-01-09 RX ORDER — GABAPENTIN 800 MG/1
TABLET ORAL
Qty: 360 TABLET | Refills: 1 | Status: SHIPPED | OUTPATIENT
Start: 2023-01-09

## 2023-01-18 ENCOUNTER — TELEPHONE (OUTPATIENT)
Dept: GYNECOLOGIC ONCOLOGY | Facility: CLINIC | Age: 83
End: 2023-01-18

## 2023-01-18 DIAGNOSIS — N90.4 LICHEN SCLEROSUS ET ATROPHICUS OF THE VULVA: ICD-10-CM

## 2023-01-18 RX ORDER — CLOBETASOL PROPIONATE 0.5 MG/G
OINTMENT TOPICAL
Qty: 30 G | Refills: 3 | Status: SHIPPED | OUTPATIENT
Start: 2023-01-18

## 2023-01-18 NOTE — TELEPHONE ENCOUNTER
Patient called into the office requesting a refill on the following medication  Patient states that it can be sent to the pharmacy below      clobetasol (TEMOVATE) 0 05 % ointment [[id number]]      [facility] [id number] [facility] - [place], [address] [address]   Phone:  [phone]  Fax:  [phone]   MARLIN #:  --

## 2023-01-19 ENCOUNTER — REMOTE DEVICE CLINIC VISIT (OUTPATIENT)
Dept: CARDIOLOGY CLINIC | Facility: CLINIC | Age: 83
End: 2023-01-19

## 2023-01-19 DIAGNOSIS — Z95.0 CARDIAC PACEMAKER IN SITU: Primary | ICD-10-CM

## 2023-01-19 NOTE — PROGRESS NOTES
Results for orders placed or performed in visit on 01/19/23   Cardiac EP device report    Narrative    MDT-SINGLE CHAMBER PPM/ NOT MRI CONDITIONAL  CARELINK TRANSMISSION: BATTERY STATUS "12 MON "  27%  ALL AVAILABLE LEAD PARAMETERS WITHIN NORMAL LIMITS  71 VHRS NOTED; AVAIL EGRAMS PRESENT AS RVR  PT ON METO SUCC & WARFARIN  EF 45% (ECHO 2022)  NORMAL DEVICE FUNCTION   NC

## 2023-01-20 ENCOUNTER — APPOINTMENT (OUTPATIENT)
Dept: LAB | Facility: HOSPITAL | Age: 83
End: 2023-01-20

## 2023-01-20 ENCOUNTER — ANTICOAG VISIT (OUTPATIENT)
Dept: CARDIOLOGY CLINIC | Facility: CLINIC | Age: 83
End: 2023-01-20

## 2023-01-20 DIAGNOSIS — I48.20 CHRONIC ATRIAL FIBRILLATION (HCC): Primary | ICD-10-CM

## 2023-01-25 DIAGNOSIS — G25.81 RLS (RESTLESS LEGS SYNDROME): ICD-10-CM

## 2023-01-25 RX ORDER — PRAMIPEXOLE DIHYDROCHLORIDE 0.5 MG/1
TABLET ORAL
Qty: 360 TABLET | Refills: 1 | Status: SHIPPED | OUTPATIENT
Start: 2023-01-25

## 2023-01-26 ENCOUNTER — OFFICE VISIT (OUTPATIENT)
Dept: CARDIOLOGY CLINIC | Facility: CLINIC | Age: 83
End: 2023-01-26

## 2023-01-26 VITALS
HEART RATE: 80 BPM | BODY MASS INDEX: 38.05 KG/M2 | HEIGHT: 65 IN | OXYGEN SATURATION: 98 % | WEIGHT: 228.4 LBS | SYSTOLIC BLOOD PRESSURE: 110 MMHG | DIASTOLIC BLOOD PRESSURE: 72 MMHG

## 2023-01-26 DIAGNOSIS — I48.20 CHRONIC ATRIAL FIBRILLATION (HCC): Primary | ICD-10-CM

## 2023-01-26 DIAGNOSIS — I50.32 CHRONIC DIASTOLIC CHF (CONGESTIVE HEART FAILURE) (HCC): ICD-10-CM

## 2023-01-26 DIAGNOSIS — I35.0 NONRHEUMATIC AORTIC VALVE STENOSIS: ICD-10-CM

## 2023-01-26 NOTE — PROGRESS NOTES
Cardiology Follow Up    [facility]  1940  [id number]  [facility]  [address]  [place] [address]  [address] [id number] [phone]    1  Chronic atrial fibrillation (HCC)  POCT ECG      2  Nonrheumatic aortic valve stenosis  Echo complete w/ contrast if indicated      3  Chronic diastolic CHF (congestive heart failure) (HCC)            Interval History: Followup afib and chf    Dyspnea stable from last time  No le edema  No chest pain  Medical Problems     Problem List     Atrial fibrillation [facility]) [I48 91]    Overview Signed 3/10/2018  8:59 AM by Maria D Epperson MD     Description: s/p 2 unsuccessful ablation, s/p 2 cardioversion last in 2010, s/p pacemaker  ; on anticoagulation followed by cardiology         Hiatal hernia    Acquired deformity of foot    Corns    Diabetic polyneuropathy associated with type 2 diabetes mellitus ([facility] )    Overview Signed 3/12/2018 12:21 PM by Maria D Epperson MD     ?duloxetine           Lab Results   Component Value Date    HGBA1C 5 7 (H) 11/09/2022         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[facility] [address] )    Radiculopathy of lumbar region    Primary osteoarthritis of both knees    Overview Addendum 2/10/2022  9:03 AM by Maria D Epperson MD     S/p injections  L TKR         Sensory polyneuropathy    RLS (restless legs syndrome)    Arthritis    Essential hypertension    Overview Signed 12/22/2020 12:51 PM by Maria D Epperson MD     lisinopril         Lichen sclerosus et atrophicus    Multiple lung nodules    Overview Addendum 6/22/2020 10:16 AM by Maria D Epperson MD     Stable, no further CT  Last CT 2016         Severe obesity (BMI 35 0-39  9) with comorbidity ([address] )    Osteopenia of multiple sites    Peripheral neuropathy    Overview Signed 3/10/2018  8:59 AM by Maria D Epperson MD     Transitioned From: Neuropathy         Vitamin D deficiency    Dense breast tissue on mammogram Difficulty walking    Flat foot    Onychomycosis    Carrero's esophagus with dysplasia    Overview Signed 6/3/2021  8:14 AM by Manda Young MD     EGD 7/20         Mild cognitive impairment    Pacemaker    GERD (gastroesophageal reflux disease)    Pain in both feet    Chronic edema    Deep venous insufficiency    Colon polyps    Lichen sclerosus et atrophicus of the vulva    Status post total knee replacement using cement, left    Leg swelling    Seasonal allergies    Exotropia of right eye    Stage 3a chronic kidney disease ([facility] )    Lab Results   Component Value Date    EGFR 61 11/09/2022    EGFR 57 06/20/2022    EGFR 64 06/06/2022    CREATININE 0 89 11/09/2022    CREATININE 0 94 06/20/2022    CREATININE 0 86 06/06/2022         Status post total knee replacement using cement, right    Other chest pain    Chronic anticoagulation    Chronic diastolic CHF (congestive heart failure) ([facility] )    Wt Readings from Last 3 Encounters:   01/26/23 104 kg (228 lb 6 4 oz)   12/13/22 103 kg (227 lb)   11/21/22 103 kg (227 lb)                     Past Medical History:   Diagnosis Date   • Arthritis    • Atrial fibrillation ([facility] )    • Carrero's esophagus     last assessed: 1/23/2018   • BRCA1 negative    • BRCA2 negative    • Breast cancer ([place] [address] ) 2006    stage 1 (left), given adjuvant radiation with Arimidex x 5 years   • Cancer ([place] [address] ) 2006    Left Breast, Lumpectomy   • Cataract     last assessed: 3/11/2014   • Chronic diastolic CHF (congestive heart failure) ([facility] ) 11/21/2022   • Dysplasia of toenail     last assessed: 8/29/2017   • Esophageal reflux    • GERD (gastroesophageal reflux disease)    • Gross hematuria     last assessed: 2/19/2015   • Hematuria    • Hiatal hernia    • History of radiation therapy    • Hypertension    • Irregular heart beat     AFIB   • Mixed sensory-motor polyneuropathy    • Neuropathy    • Obesity    • Pacemaker    • Paroxysmal atrial fibrillation 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      Comment: retired   Tobacco Use   • Smoking status: Former     Packs/day: 1 00     Years: 25 00     Pack years: 25 00     Types: Cigarettes     Quit date: 1980     Years since quittin 3   • Smokeless tobacco: Never   • Tobacco comments:     Quit over 30 years ago; quit age 41   Vaping Use   • Vaping Use: Never used   Substance and Sexual Activity   • Alcohol use: Not Currently   • Drug use: No   • Sexual activity: Not on file   Other Topics Concern   • Not on file   Social History Narrative         Social Determinants of Health     Financial Resource Strain: Low Risk    • Difficulty of Paying Living Expenses: Not very hard   Food Insecurity: No Food Insecurity   • Worried About Running Out of Food in the Last Year: Never true   • [address] in the Last Year: Never true   Transportation Needs: No Transportation Needs   • Lack of Transportation (Medical): No   • Lack of Transportation (Non-Medical):  No   Physical Activity: Not on file   Stress: Not on file   Social Connections: Not on file   Intimate Partner Violence: Not on file   Housing Stability: Low Risk    • Unable to Pay for Housing in the Last Year: No   • Number of Places Lived in the Last Year: 1   • Unstable Housing in the Last Year: No      Family History   Problem Relation Age of Onset   • Hypertension Mother    • Heart disease Father    • Aneurysm Father    • Coronary artery disease Father         in his 76s with aneurysm   • Aortic aneurysm Father         abdominal   • Scleroderma Sister    • Breast cancer Sister 76   • Hypertension Sister    • Cancer Sister    • No Known Problems Son    • No Known Problems Son    • Testicular cancer Son 39   • Thyroid cancer Son 45   • Colon cancer Maternal Aunt    • Colon cancer Maternal Aunt    • Breast cancer Other 48        kaylee's daughter   • Alcohol abuse Neg Hx    • Substance Abuse Neg Hx    • Mental illness Neg Hx    • Depression Neg Hx      Past Surgical History:   Procedure Laterality Date   • BREAST BIOPSY Left 2006   • BREAST LUMPECTOMY Left 2006    onset: 2006   • BREAST SURGERY     • CARDIAC PACEMAKER PLACEMENT      x 3 2006   • CATARACT EXTRACTION     • COLONOSCOPY     • CYSTOSCOPY  04/04/2014    diagnostic   • HYSTERECTOMY      ALISON BSO; due to fibroid uterus; age 38   • KNEE CARTILAGE SURGERY      excision lesion of meniscus or capsule knee   • KNEE SURGERY     • OOPHORECTOMY Bilateral     age 38   • OTHER SURGICAL HISTORY      radiation therapy   • VA ARTHRP KNE CONDYLE&PLATU MEDIAL&LAT COMPARTMENTS Left 08/17/2020    Procedure: ARTHROPLASTY KNEE TOTAL;  Surgeon: Franci Leonard DO;  Location: [facility];  Service: Orthopedics   • VA ARTHRP KNE CONDYLE&PLATU MEDIAL&LAT COMPARTMENTS Right 03/28/2022    Procedure: ARTHROPLASTY KNEE TOTAL W ROBOT - RIGHT;  Surgeon: Franci Leonard DO;  Location: [facility];  Service: Orthopedics   • VA COLONOSCOPY FLX DX W/COLLJ SPEC WHEN PFRMD N/A 02/08/2017    Procedure: COLONOSCOPY;  Surgeon: Jillian Ling MD;  Location:  GI LAB; Service: Gastroenterology   • VA ESOPHAGOGASTRODUODENOSCOPY TRANSORAL DIAGNOSTIC N/A 09/20/2017    Procedure: ESOPHAGOGASTRODUODENOSCOPY (EGD); Surgeon: Lucas Lam MD;  Location: [facility];   Service: Gastroenterology   • UPPER GASTROINTESTINAL ENDOSCOPY         Current Outpatient Medications:   •  acetaminophen (TYLENOL) 325 mg tablet, Take 3 tablets (975 mg total) by mouth every 8 (eight) hours, Disp: , Rfl: 0  •  Calcium Acetate, Phos Binder, (CALCIUM ACETATE PO), Take by mouth daily  , Disp: , Rfl:   •  clobetasol (Chick Perrin) 0 05 % ointment, Apply once weekly to the affected area, Disp: 30 g, Rfl: 3  •  famotidine (PEPCID) 40 MG tablet, TAKE 1 TABLET BY MOUTH EVERY DAY, Disp: 90 tablet, Rfl: 1  •  fluticasone (FLONASE) 50 mcg/act nasal spray, SPRAY 1 SPRAY INTO EACH NOSTRIL EVERY DAY, Disp: 16 mL, Rfl: 2  •  gabapentin (NEURONTIN) 800 mg tablet, TAKE 1 TABLET BY MOUTH FOUR TIMES A DAY, Disp: 360 tablet, Rfl: 1  •  loratadine (CLARITIN) 10 mg tablet, Take 10 mg by mouth daily, Disp: , Rfl:   •  magnesium 30 MG tablet, Take 30 mg by mouth in the morning, Disp: , Rfl:   •  metoprolol tartrate (LOPRESSOR) 50 mg tablet, TAKE 1 5 TABLETS BY MOUTH 2 TIMES A DAY , Disp: 270 tablet, Rfl: 3  •  multivitamin (THERAGRAN) TABS, Take 1 tablet by mouth daily, Disp: , Rfl:   •  pramipexole (MIRAPEX) 0 5 mg tablet, TAKE 2 FULL TABLETS TWICE A DAY AT 5:00 P  M   AND 10:00 P M , Disp: 360 tablet, Rfl: 1  •  torsemide (DEMADEX) 20 mg tablet, Take 1 tablet (20 mg total) by mouth daily, Disp: 90 tablet, Rfl: 3  •  warfarin (COUMADIN) 7 5 mg tablet, TAKE 1 TABLET BY MOUTH EVERY DAY, Disp: 90 tablet, Rfl: 3  •  traMADol (Ultram) 50 mg tablet, Take 1 tablet (50 mg total) by mouth once as needed for moderate pain for up to 14 doses (Patient not taking: Reported on 7/12/2022), Disp: 14 tablet, Rfl: 0  Allergies   Allergen Reactions   • Latex      Added based on information entered during case entry, please review and add reactions, type, and severity as needed   • Cephalexin Rash   • Duloxetine Hcl Other (See Comments)     Facial pins and needles sensation   • Erythromycin Rash   • Levofloxacin Other (See Comments)     Muscular aches   • Penicillins Rash   • Savella [Milnacipran] Rash   • Sulfa Antibiotics Rash       Labs:     Chemistry        Component Value Date/Time     11/09/2015 1115    K 4 3 11/09/2022 1434    K 4 5 11/09/2015 1115     11/09/2022 1434     11/09/2015 1115    CO2 31 11/09/2022 1434    CO2 30 (H) 11/09/2015 1115    BUN 25 11/09/2022 1434    BUN 20 11/09/2015 1115    CREATININE 0 89 11/09/2022 1434    CREATININE 0 8 11/09/2015 1115        Component Value Date/Time    CALCIUM 9 6 11/09/2022 1434    CALCIUM 9 0 11/09/2015 1115    ALKPHOS 88 11/09/2022 1434    ALKPHOS 61 11/09/2015 1115    AST 27 11/09/2022 1434    AST 23 11/09/2015 1115    ALT 26 11/09/2022 1434    ALT 31 11/09/2015 1115    BILITOT 0 5 11/09/2015 1115            Lab Results   Component Value Date    CHOL 162 11/09/2015     Lab Results   Component Value Date    HDL 35 (L) 11/09/2022    HDL 45 10/25/2021    HDL 40 12/08/2020     Lab Results   Component Value Date    LDLCALC 53 11/09/2022    LDLCALC 80 10/25/2021    LDLCALC 61 12/08/2020     Lab Results   Component Value Date    TRIG 94 11/09/2022    TRIG 69 10/25/2021    TRIG 69 12/08/2020     No results found for: CHOLHDL    Imaging: Cardiac EP device report    Result Date: 1/19/2023  Narrative: MDT-SINGLE CHAMBER PPM/ NOT MRI CONDITIONAL CARELINK TRANSMISSION: BATTERY STATUS "12 MON "  27%  ALL AVAILABLE LEAD PARAMETERS WITHIN NORMAL LIMITS  71 VHRS NOTED; AVAIL EGRAMS PRESENT AS RVR  PT ON METO SUCC & WARFARIN  EF 45% (ECHO 2022)  NORMAL DEVICE FUNCTION  NC       EKG: Atrial Fibrillation     Review of Systems   Constitutional: Negative  HENT: Negative  Eyes: Negative  Cardiovascular: Negative  Respiratory: Positive for shortness of breath  Endocrine: Negative  Hematologic/Lymphatic: Negative  Skin: Negative  Musculoskeletal: Negative  Gastrointestinal: Negative  Genitourinary: Negative  Neurological: Negative  Psychiatric/Behavioral: Negative  Allergic/Immunologic: Negative  Vitals:    01/26/23 0928   BP: 110/72   Pulse: 80   SpO2: 98%           Physical Exam  Vitals and nursing note reviewed  Constitutional:       Appearance: Normal appearance  HENT:      Head: Normocephalic        Nose: Nose normal       Mouth/Throat:      Mouth: Mucous membranes are moist    Eyes: General: No scleral icterus  Conjunctiva/sclera: Conjunctivae normal    Cardiovascular:      Rate and Rhythm: Normal rate  Rhythm irregular  Heart sounds: Murmur heard  No gallop  Pulmonary:      Effort: Pulmonary effort is normal  No respiratory distress  Breath sounds: Normal breath sounds  No wheezing or rales  Abdominal:      General: Abdomen is flat  Bowel sounds are normal  There is no distension  Palpations: Abdomen is soft  Tenderness: There is no abdominal tenderness  There is no guarding  Musculoskeletal:      Cervical back: Normal range of motion and neck supple  Right lower leg: No edema  Left lower leg: No edema  Skin:     General: Skin is warm and dry  Neurological:      General: No focal deficit present  Mental Status: She is alert and oriented to person, place, and time  Psychiatric:         Mood and Affect: Mood normal          Behavior: Behavior normal          Discussion/Summary:       1  Chronic Diastolic CHF: NYHA 2  Continue with torsemide at current dosing       2  Chronic Atrial Fibrillation: Overall rate controlled  Continue Metoprolol  Stable on Coumadin      3  Hypertension: Her BP is well controlled  Continue current medical therapy        4  s/p PPM: Continue device checks  Device checks were reviewed  She has device checks monthly now for gen change coming up        5  Moderate Aortic Stenosis: Will continue to follow  Update echo              The patient was counseled regarding diagnostic results, instructions for management, risk factor reductions, impressions  total time of encounter was 25 minutes and 15 minutes was spent counseling

## 2023-02-21 ENCOUNTER — OFFICE VISIT (OUTPATIENT)
Dept: PODIATRY | Facility: CLINIC | Age: 83
End: 2023-02-21

## 2023-02-21 ENCOUNTER — ANTICOAG VISIT (OUTPATIENT)
Dept: CARDIOLOGY CLINIC | Facility: CLINIC | Age: 83
End: 2023-02-21

## 2023-02-21 ENCOUNTER — APPOINTMENT (OUTPATIENT)
Dept: LAB | Facility: HOSPITAL | Age: 83
End: 2023-02-21

## 2023-02-21 VITALS
DIASTOLIC BLOOD PRESSURE: 72 MMHG | WEIGHT: 228 LBS | BODY MASS INDEX: 37.99 KG/M2 | SYSTOLIC BLOOD PRESSURE: 110 MMHG | RESPIRATION RATE: 17 BRPM | HEIGHT: 65 IN

## 2023-02-21 DIAGNOSIS — L84 CORNS: ICD-10-CM

## 2023-02-21 DIAGNOSIS — M79.671 PAIN IN BOTH FEET: ICD-10-CM

## 2023-02-21 DIAGNOSIS — I48.20 CHRONIC ATRIAL FIBRILLATION (HCC): Primary | ICD-10-CM

## 2023-02-21 DIAGNOSIS — E11.51 TYPE 2 DIABETES MELLITUS WITH DIABETIC PERIPHERAL ANGIOPATHY WITHOUT GANGRENE, WITHOUT LONG-TERM CURRENT USE OF INSULIN (HCC): Primary | ICD-10-CM

## 2023-02-21 DIAGNOSIS — M79.672 PAIN IN BOTH FEET: ICD-10-CM

## 2023-02-21 DIAGNOSIS — I70.209 PERIPHERAL ARTERIOSCLEROSIS (HCC): ICD-10-CM

## 2023-02-23 ENCOUNTER — REMOTE DEVICE CLINIC VISIT (OUTPATIENT)
Dept: CARDIOLOGY CLINIC | Facility: CLINIC | Age: 83
End: 2023-02-23

## 2023-02-23 DIAGNOSIS — Z95.0 CARDIAC PACEMAKER IN SITU: Primary | ICD-10-CM

## 2023-02-23 NOTE — PROGRESS NOTES
MDT-SINGLE CHAMBER PPM/ NOT MRI CONDITIONAL   CARELINK TRANSMISSION: BATTERY VOLTAGE NEARING ISABELA-11 MONS  WILL SCHEDULE MONTHLY BATTERY CHECKS   28%  ALL AVAILABLE LEAD PARAMETERS WITHIN NORMAL LIMITS  6 VHRS NOTED; EGRAMS PRESENT AS RVR  PT ON METO TART & EF 45% (ECHO 2022)  NORMAL DEVICE FUNCTION   NC

## 2023-03-09 ENCOUNTER — APPOINTMENT (OUTPATIENT)
Dept: LAB | Facility: CLINIC | Age: 83
End: 2023-03-09

## 2023-03-10 ENCOUNTER — ANTICOAG VISIT (OUTPATIENT)
Dept: CARDIOLOGY CLINIC | Facility: CLINIC | Age: 83
End: 2023-03-10

## 2023-03-10 DIAGNOSIS — I48.20 CHRONIC ATRIAL FIBRILLATION (HCC): Primary | ICD-10-CM

## 2023-03-14 DIAGNOSIS — J30.2 SEASONAL ALLERGIES: ICD-10-CM

## 2023-03-14 RX ORDER — FLUTICASONE PROPIONATE 50 MCG
SPRAY, SUSPENSION (ML) NASAL
Qty: 48 ML | Refills: 3 | Status: SHIPPED | OUTPATIENT
Start: 2023-03-14

## 2023-03-16 ENCOUNTER — OFFICE VISIT (OUTPATIENT)
Dept: INTERNAL MEDICINE CLINIC | Facility: CLINIC | Age: 83
End: 2023-03-16

## 2023-03-16 VITALS
DIASTOLIC BLOOD PRESSURE: 80 MMHG | OXYGEN SATURATION: 96 % | HEIGHT: 65 IN | TEMPERATURE: 97.8 F | BODY MASS INDEX: 36.99 KG/M2 | WEIGHT: 222 LBS | SYSTOLIC BLOOD PRESSURE: 112 MMHG | HEART RATE: 95 BPM

## 2023-03-16 DIAGNOSIS — N18.31 STAGE 3A CHRONIC KIDNEY DISEASE (HCC): ICD-10-CM

## 2023-03-16 DIAGNOSIS — R26.2 DIFFICULTY WALKING: ICD-10-CM

## 2023-03-16 DIAGNOSIS — I48.20 CHRONIC ATRIAL FIBRILLATION (HCC): Primary | ICD-10-CM

## 2023-03-16 DIAGNOSIS — G25.81 RLS (RESTLESS LEGS SYNDROME): ICD-10-CM

## 2023-03-16 NOTE — ASSESSMENT & PLAN NOTE
Improved, not using cane  Discussed lumbar x-ray if leg pain recurs  Continue going to the gym 2 days a week

## 2023-03-16 NOTE — PROGRESS NOTES
Assessment/Plan:    Atrial fibrillation (HCC) [I48 91]  No symptoms  On metoprolol, warfarin  Sees cardiology  Stage 3a chronic kidney disease [facility])  Lab Results   Component Value Date    EGFR 61 11/09/2022    EGFR 57 06/20/2022    EGFR 64 06/06/2022    CREATININE 0 89 11/09/2022    CREATININE 0 94 06/20/2022    CREATININE 0 86 06/06/2022     Repeat labs due  Avoid NSAIDs  RLS (restless legs syndrome)  Stable, on pramipexole and gabapentin  No medication changes  Per neurology  Difficulty walking  Improved, not using cane  Discussed lumbar x-ray if leg pain recurs  Continue going to the gym 2 days a week  Diagnoses and all orders for this visit:    Chronic atrial fibrillation (HCC)    RLS (restless legs syndrome)    Stage 3a chronic kidney disease ([facility] )    Difficulty walking    Follow up as scheduled or as needed  Subjective:      Patient ID: Rene Cui is a [de-identified] y o  female has a few complaints  She reports not feeling well for the past few months  She felt weak, lousy, shaky  She felt unsteady on her feet, needing to juse a cane  She did not feel well almost daily for the past few months  She still went to the gym even if she felt unsteady  No falls  She is concerned that her RLS medications are causing this  No cough or cold symptoms  A few days ago, she started feeling like her old self  She has more energy  She reports no cough or cold symptoms the past few month, no sore throat or GI symptoms  About a week ago, she reports experiencing bilateral leg pain, behind both thighs  Pain worse when seated, sometimes it would wake her up  No pain with ambulation  She denies any back pain  She was thinking it was due to her knees, had surgery for her right knee last year  She is asking what hallucinations are  She would take a nap in the afternoon and wake up talking  She was not aware she was talking,  says she was talking in her sleep   She was having vivid dreams, not nightmares  This does not occur often  The following portions of the patient's history were reviewed and updated as appropriate: allergies, current medications, past medical history, past social history and problem list     Review of Systems   Constitutional: Negative for activity change and appetite change  Respiratory: Negative for shortness of breath  Cardiovascular: Negative for chest pain and palpitations  Gastrointestinal: Negative for nausea  Musculoskeletal: Positive for gait problem  Negative for back pain and joint swelling  Neurological: Positive for weakness and light-headedness  Negative for syncope, numbness and headaches  Psychiatric/Behavioral: Negative for sleep disturbance  The patient is nervous/anxious  Objective:      /80   Pulse 95   Temp 97 8 °F (36 6 °C)   Ht 5' 5" (1 651 m)   Wt 101 kg (222 lb)   SpO2 96%   BMI 36 94 kg/m²          Physical Exam  Constitutional:       General: She is not in acute distress  Appearance: Normal appearance  She is not ill-appearing  HENT:      Head: Normocephalic and atraumatic  Mouth/Throat:      Mouth: Mucous membranes are moist    Eyes:      Pupils: Pupils are equal, round, and reactive to light  Cardiovascular:      Rate and Rhythm: Normal rate and regular rhythm  Pulmonary:      Effort: Pulmonary effort is normal  No respiratory distress  Breath sounds: Normal breath sounds  Abdominal:      Tenderness: There is no abdominal tenderness  Neurological:      General: No focal deficit present  Mental Status: She is alert and oriented to person, place, and time     Psychiatric:         Mood and Affect: Mood normal          Behavior: Behavior normal

## 2023-03-16 NOTE — ASSESSMENT & PLAN NOTE
Lab Results   Component Value Date    EGFR 61 11/09/2022    EGFR 57 06/20/2022    EGFR 64 06/06/2022    CREATININE 0 89 11/09/2022    CREATININE 0 94 06/20/2022    CREATININE 0 86 06/06/2022     Repeat labs due  Avoid NSAIDs

## 2023-03-22 ENCOUNTER — APPOINTMENT (OUTPATIENT)
Dept: RADIOLOGY | Facility: CLINIC | Age: 83
End: 2023-03-22

## 2023-03-22 ENCOUNTER — OFFICE VISIT (OUTPATIENT)
Dept: OBGYN CLINIC | Facility: CLINIC | Age: 83
End: 2023-03-22

## 2023-03-22 VITALS
TEMPERATURE: 98.1 F | HEIGHT: 65 IN | HEART RATE: 70 BPM | DIASTOLIC BLOOD PRESSURE: 76 MMHG | WEIGHT: 228 LBS | BODY MASS INDEX: 37.99 KG/M2 | SYSTOLIC BLOOD PRESSURE: 116 MMHG

## 2023-03-22 DIAGNOSIS — G89.29 CHRONIC PAIN OF RIGHT KNEE: ICD-10-CM

## 2023-03-22 DIAGNOSIS — Z96.651 STATUS POST TOTAL RIGHT KNEE REPLACEMENT USING CEMENT: Primary | ICD-10-CM

## 2023-03-22 DIAGNOSIS — Z96.651 STATUS POST TOTAL RIGHT KNEE REPLACEMENT USING CEMENT: ICD-10-CM

## 2023-03-22 DIAGNOSIS — M25.561 CHRONIC PAIN OF RIGHT KNEE: ICD-10-CM

## 2023-03-22 PROBLEM — M17.0 PRIMARY OSTEOARTHRITIS OF BOTH KNEES: Status: RESOLVED | Noted: 2018-02-21 | Resolved: 2023-03-22

## 2023-03-22 NOTE — PROGRESS NOTES
Assessment/Plan:  1  Status post total right knee replacement using cement  XR knee 3 vw right non injury    Brace      2  Chronic pain of right knee          Scribe Attestation    I,:  Pierre Shah PA-C am acting as a scribe while in the presence of the attending physician :       I,:  Lorraine Asher, DO personally performed the services described in this documentation    as scribed in my presence :         Carrol Apgar is a very pleasant 27-year-old female presenting today 1 year after robotic assisted right total knee arthroplasty  She has rotational instability of the posterior medial corner of the knee  This is likely due to deep MCL laxity which has likely developed over time since surgery  It is not a problem for the prosthesis itself and does not cause any gross instability  It is likely the cause of her ongoing clicking sensation with ambulation and flexion  In order to treat this, we have fit her with a hinged knee brace to wear while active  We feel it reasonable for her to continue efforts at the gym  With a hinged knee brace, we hope to limit rotation and thereby limit the clicking  We will follow-up in 3 months to reevaluate  Again there is no concern for the integrity of the prosthesis  She does understand the need for antibiotic prophylaxis before any dental appointment  She will not need x-rays at her 3-month appointment  All questions addressed    Subjective: 1 year s/p total knee arthroplasty    Patient ID: Aurora Murphy is a [de-identified] y o  female presenting today 6 months from her last appointment in 1 year since undergoing a robotic assisted right total knee arthroplasty  She reports that she continues to have pain on a daily basis  She feels a clicking with ambulation about three quarters of the time  Is not significantly painful but is consistent  Also has pain driving long distances and difficulty leading with her right leg up steps    She has been participating in the gym twice a week but still having some discomfort  She denies any paresthesias  She occasionally feels unsteady on the knee, but that is not common  Painless clicking 3/4 time  Still doing gym 2x/week  Cant drive long distances without pain  Cant lead up steps      Review of Systems   Constitutional: Negative  HENT: Negative  Eyes: Negative  Respiratory: Negative  Cardiovascular: Negative  Gastrointestinal: Negative  Endocrine: Negative  Genitourinary: Negative  Musculoskeletal: Positive for arthralgias, gait problem, joint swelling and myalgias  Skin: Negative  Allergic/Immunologic: Negative  Hematological: Negative  Psychiatric/Behavioral: Negative        Past Medical History:   Diagnosis Date   • Arthritis    • Atrial fibrillation ([place] [address] )    • Carrero's esophagus     last assessed: 1/23/2018   • BRCA1 negative    • BRCA2 negative    • Breast cancer ([place] [address] ) 2006    stage 1 (left), given adjuvant radiation with Arimidex x 5 years   • Cancer ([place] [address] ) 2006    Left Breast, Lumpectomy   • Cataract     last assessed: 3/11/2014   • Chronic diastolic CHF (congestive heart failure) ([place] [address] ) 11/21/2022   • Dysplasia of toenail     last assessed: 8/29/2017   • Esophageal reflux    • GERD (gastroesophageal reflux disease)    • Gross hematuria     last assessed: 2/19/2015   • Hematuria    • Hiatal hernia    • History of radiation therapy    • Hypertension    • Irregular heart beat     AFIB   • Mixed sensory-motor polyneuropathy    • Neuropathy    • Obesity    • Pacemaker    • Paroxysmal atrial fibrillation [facility])    • Peripheral neuropathy    • Rectal bleeding    • Restless leg syndrome    • Shortness of breath     last assessed: 1/11/2016       Past Surgical History:   Procedure Laterality Date   • BREAST BIOPSY Left 2006   • BREAST LUMPECTOMY Left 2006    onset: 2006   • BREAST SURGERY     • CARDIAC PACEMAKER PLACEMENT      x 3 2006   • CATARACT EXTRACTION     • COLONOSCOPY     • CYSTOSCOPY  04/04/2014 diagnostic   • HYSTERECTOMY      ALISON BSO; due to fibroid uterus; age 38   • KNEE CARTILAGE SURGERY      excision lesion of meniscus or capsule knee   • KNEE SURGERY     • OOPHORECTOMY Bilateral     age 38   • OTHER SURGICAL HISTORY      radiation therapy   • LA ARTHRP KNE CONDYLE&PLATU MEDIAL&LAT COMPARTMENTS Left 08/17/2020    Procedure: ARTHROPLASTY KNEE TOTAL;  Surgeon: Danna Hernandez DO;  Location: [address] [place] [address];  Service: Orthopedics   • LA ARTHRP KNE CONDYLE&PLATU MEDIAL&LAT COMPARTMENTS Right 03/28/2022    Procedure: ARTHROPLASTY KNEE TOTAL W ROBOT - RIGHT;  Surgeon: Danna Hernandez DO;  Location: [facility];  Service: Orthopedics   • LA COLONOSCOPY FLX DX W/COLLJ SPEC WHEN PFRMD N/A 02/08/2017    Procedure: COLONOSCOPY;  Surgeon: eRmedios Hughes MD;  Location:  GI LAB; Service: Gastroenterology   • LA ESOPHAGOGASTRODUODENOSCOPY TRANSORAL DIAGNOSTIC N/A 09/20/2017    Procedure: ESOPHAGOGASTRODUODENOSCOPY (EGD); Surgeon: Leigh Garcia MD;  Location: [facility];   Service: Gastroenterology   • UPPER GASTROINTESTINAL ENDOSCOPY         Family History   Problem Relation Age of Onset   • Hypertension Mother    • Heart disease Father    • Aneurysm Father    • Coronary artery disease Father         in his 76s with aneurysm   • Aortic aneurysm Father         abdominal   • Scleroderma Sister    • Breast cancer Sister 76   • Hypertension Sister    • Cancer Sister    • No Known Problems Son    • No Known Problems Son    • Testicular cancer Son 39   • Thyroid cancer Son 45   • Colon cancer Maternal Aunt    • Colon cancer Maternal Aunt    • Breast cancer Other 48        kaylee's daughter   • Alcohol abuse Neg Hx    • Substance Abuse Neg Hx    • Mental illness Neg Hx    • Depression Neg Hx        Social History     Occupational History   • 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     Comment: retired   Tobacco Use   • Smoking status: Former     Packs/day: 1 00     Years: 25 00     Pack years: 25 00     Types: Cigarettes     Quit date: 1980     Years since quittin 5   • Smokeless tobacco: Never   • Tobacco comments:     Quit over 30 years ago; quit age 41   Vaping Use   • Vaping Use: Never used   Substance and Sexual Activity   • Alcohol use: Not Currently   • Drug use: No   • Sexual activity: Not on file         Current Outpatient Medications:   •  acetaminophen (TYLENOL) 325 mg tablet, Take 3 tablets (975 mg total) by mouth every 8 (eight) hours, Disp: , Rfl: 0  •  Calcium Acetate, Phos Binder, (CALCIUM ACETATE PO), Take by mouth daily  , Disp: , Rfl:   •  clobetasol (TEMOVATE) 0 05 % ointment, Apply once weekly to the affected area, Disp: 30 g, Rfl: 3  •  famotidine (PEPCID) 40 MG tablet, TAKE 1 TABLET BY MOUTH EVERY DAY, Disp: 90 tablet, Rfl: 1  •  fluticasone (FLONASE) 50 mcg/act nasal spray, SPRAY 1 SPRAY INTO EACH NOSTRIL EVERY DAY, Disp: 48 mL, Rfl: 3  •  gabapentin (NEURONTIN) 800 mg tablet, TAKE 1 TABLET BY MOUTH FOUR TIMES A DAY, Disp: 360 tablet, Rfl: 1  •  loratadine (CLARITIN) 10 mg tablet, Take 10 mg by mouth daily, Disp: , Rfl:   •  magnesium 30 MG tablet, Take 30 mg by mouth in the morning, Disp: , Rfl:   •  metoprolol tartrate (LOPRESSOR) 50 mg tablet, TAKE 1 5 TABLETS BY MOUTH 2 TIMES A DAY , Disp: 270 tablet, Rfl: 3  •  multivitamin (THERAGRAN) TABS, Take 1 tablet by mouth daily, Disp: , Rfl:   •  pramipexole (MIRAPEX) 0 5 mg tablet, TAKE 2 FULL TABLETS TWICE A DAY AT 5:00 P  M   AND 10:00 P M , Disp: 360 tablet, Rfl: 1  •  torsemide (DEMADEX) 20 mg tablet, Take 1 tablet (20 mg total) by mouth daily, Disp: 90 tablet, Rfl: 3  •  warfarin (COUMADIN) 7 5 mg tablet, TAKE 1 TABLET BY MOUTH EVERY DAY, Disp: 90 tablet, Rfl: 3    Allergies   Allergen Reactions   • Latex      Added based on information entered during case entry, please review and add reactions, type, and severity as needed   • Cephalexin Rash   • Duloxetine Hcl Other (See Comments)     Facial pins and needles sensation   • Erythromycin Rash   • Levofloxacin Other (See Comments)     Muscular aches   • Penicillins Rash   • Savella [Milnacipran] Rash   • Sulfa Antibiotics Rash       Objective:  Vitals:    03/22/23 1322   BP: 116/76   Pulse: 70   Temp: 98 1 °F (36 7 °C)       Body mass index is 37 94 kg/m²  Right Knee Exam     Muscle Strength   The patient has normal right knee strength  Tenderness   The patient is experiencing tenderness in the medial joint line (posteromedial)  Range of Motion   Extension:  0 normal   Flexion:  120 normal     Tests   Varus: negative Valgus: negative  Lachman:  Anterior - negative    Posterior - negative  Patellar apprehension: negative    Other   Erythema: absent  Scars: present  Sensation: normal  Pulse: present  Swelling: none  Effusion: no effusion present    Comments:  Stable at 0, 30 and 90 degrees to varus and valgus stress  Rotational instability posteromedial corner with audible clunk  Neurovascularly intact distally  No warmth or erythema   Patella tracks flat and midline without significant crepitance  Audible clunk with ambulation          Observations     Right Knee   Negative for effusion  Physical Exam  Vitals and nursing note reviewed  Constitutional:       Appearance: Normal appearance  She is well-developed  Comments: Body mass index is 37 94 kg/m²  HENT:      Head: Normocephalic and atraumatic  Right Ear: External ear normal       Left Ear: External ear normal    Eyes:      Extraocular Movements: Extraocular movements intact  Conjunctiva/sclera: Conjunctivae normal    Cardiovascular:      Rate and Rhythm: Normal rate  Pulses: Normal pulses  Pulmonary:      Effort: Pulmonary effort is normal    Musculoskeletal:      Cervical back: Normal range of motion  Right knee: No effusion  Comments: See ortho exam   Skin:     General: Skin is warm and dry  Neurological:      General: No focal deficit present        Mental Status: She is alert and oriented to person, place, and time  Mental status is at baseline  Psychiatric:         Mood and Affect: Mood normal          Behavior: Behavior normal          Thought Content: Thought content normal          Judgment: Judgment normal        I have personally reviewed pertinent films in PACS of the x-rays taken today of her right knee compared them to previous postoperative films  There is been no change in alignment of the total knee prosthesis which is well aligned and well cemented  There is no signs of loosening, periprosthetic fracture, or other interval complication    This document was created using speech voice recognition software  Grammatical errors, random word insertions, pronoun errors, and incomplete sentences are an occasional consequence of this system due to software limitations, ambient noise, and hardware issues  Any formal questions or concerns about content, text, or information contained within the body of this dictation should be directly addressed to the provider for clarification

## 2023-03-22 NOTE — PATIENT INSTRUCTIONS
Surya Montes has rotational instability of the posterior medial corner of the knee  This is likely due to deep MCL laxity which has likely developed over time  It is not a problem for the prosthesis itself  It is likely the cause of her ongoing clicking sensation  With a hinged knee brace, we hope to limit rotation and thereby limit the clicking  We will follow-up in 3 months to reevaluate    Again there is no concern for the integrity of the prosthesis

## 2023-03-23 ENCOUNTER — REMOTE DEVICE CLINIC VISIT (OUTPATIENT)
Dept: CARDIOLOGY CLINIC | Facility: CLINIC | Age: 83
End: 2023-03-23

## 2023-03-23 DIAGNOSIS — Z95.0 PRESENCE OF CARDIAC PACEMAKER: Primary | ICD-10-CM

## 2023-03-24 ENCOUNTER — TELEPHONE (OUTPATIENT)
Dept: OBGYN CLINIC | Facility: HOSPITAL | Age: 83
End: 2023-03-24

## 2023-03-24 NOTE — TELEPHONE ENCOUNTER
Caller: Patient    Doctor: Bradford Diallo    Reason for call: The brace is not working at all  Please advise patient  She states it keeps falling down      Call back#: [phone]

## 2023-04-06 ENCOUNTER — APPOINTMENT (OUTPATIENT)
Dept: LAB | Facility: CLINIC | Age: 83
End: 2023-04-06

## 2023-04-07 ENCOUNTER — ANTICOAG VISIT (OUTPATIENT)
Dept: CARDIOLOGY CLINIC | Facility: CLINIC | Age: 83
End: 2023-04-07

## 2023-04-07 DIAGNOSIS — I48.20 CHRONIC ATRIAL FIBRILLATION (HCC): Primary | ICD-10-CM

## 2023-04-24 ENCOUNTER — REMOTE DEVICE CLINIC VISIT (OUTPATIENT)
Dept: CARDIOLOGY CLINIC | Facility: CLINIC | Age: 83
End: 2023-04-24

## 2023-04-24 ENCOUNTER — OFFICE VISIT (OUTPATIENT)
Dept: INTERNAL MEDICINE CLINIC | Facility: CLINIC | Age: 83
End: 2023-04-24

## 2023-04-24 VITALS
OXYGEN SATURATION: 96 % | DIASTOLIC BLOOD PRESSURE: 80 MMHG | BODY MASS INDEX: 37.99 KG/M2 | SYSTOLIC BLOOD PRESSURE: 102 MMHG | HEIGHT: 65 IN | HEART RATE: 92 BPM | WEIGHT: 228 LBS | TEMPERATURE: 96.5 F

## 2023-04-24 DIAGNOSIS — N18.31 STAGE 3A CHRONIC KIDNEY DISEASE (HCC): ICD-10-CM

## 2023-04-24 DIAGNOSIS — K21.9 GASTROESOPHAGEAL REFLUX DISEASE, UNSPECIFIED WHETHER ESOPHAGITIS PRESENT: ICD-10-CM

## 2023-04-24 DIAGNOSIS — Z95.0 PRESENCE OF CARDIAC PACEMAKER: Primary | ICD-10-CM

## 2023-04-24 DIAGNOSIS — Z12.31 ENCOUNTER FOR SCREENING MAMMOGRAM FOR BREAST CANCER: ICD-10-CM

## 2023-04-24 DIAGNOSIS — I48.20 CHRONIC ATRIAL FIBRILLATION (HCC): ICD-10-CM

## 2023-04-24 DIAGNOSIS — E11.42 DIABETIC POLYNEUROPATHY ASSOCIATED WITH TYPE 2 DIABETES MELLITUS (HCC): ICD-10-CM

## 2023-04-24 DIAGNOSIS — E55.9 VITAMIN D DEFICIENCY: ICD-10-CM

## 2023-04-24 DIAGNOSIS — E66.01 SEVERE OBESITY (BMI 35.0-39.9) WITH COMORBIDITY (HCC): ICD-10-CM

## 2023-04-24 DIAGNOSIS — G25.81 RLS (RESTLESS LEGS SYNDROME): ICD-10-CM

## 2023-04-24 DIAGNOSIS — I50.32 CHRONIC DIASTOLIC CHF (CONGESTIVE HEART FAILURE) (HCC): Primary | ICD-10-CM

## 2023-04-24 PROBLEM — M79.671 PAIN IN BOTH FEET: Status: RESOLVED | Noted: 2019-05-08 | Resolved: 2023-04-24

## 2023-04-24 PROBLEM — M79.672 PAIN IN BOTH FEET: Status: RESOLVED | Noted: 2019-05-08 | Resolved: 2023-04-24

## 2023-04-24 PROBLEM — R07.89 OTHER CHEST PAIN: Status: RESOLVED | Noted: 2022-05-22 | Resolved: 2023-04-24

## 2023-04-24 NOTE — PROGRESS NOTES
NON-BILLABLE CARELINK TRANSMISSION: BATTERY STATUS: BATTERY VOLTAGE NEARING ISABELA (11 MOS~<1-23 MOS~2 69V/4,863 OHMS)  WILL SCHEDULE MONTHLY BATTERY CHECKS  : 28 8%  ALL AVAILABLE LEAD PARAMETERS WITHIN NORMAL LIMITS  6 NEW VHR EPISODES W/ AVAIL EGMS/MARKERS SHOWING AF W/ HRS  BPM, MAX DURATION 1 MIN 24 SECS  PT TAKES WARFARIN, METOPROLOL TART  EF: 45% (ECHO 6/21/22)  PACEMAKER FUNCTIONING APPROPRIATELY    [address] [place] [address]

## 2023-04-24 NOTE — ASSESSMENT & PLAN NOTE
Lab Results   Component Value Date    EGFR 49 04/12/2023    EGFR 61 11/09/2022    EGFR 57 06/20/2022    CREATININE 1 06 04/12/2023    CREATININE 0 89 11/09/2022    CREATININE 0 94 06/20/2022     Slightly worse  Will continue to monitor  Avoid NSAIDs

## 2023-04-24 NOTE — ASSESSMENT & PLAN NOTE
Taking antihistamine daily, using nasal spray prn  Instructed to rinse mouth after using steroid nasal spray

## 2023-04-24 NOTE — PATIENT INSTRUCTIONS
Rinse your mouth with mouth wash or water after using Flonase  Limit eating acidic foods/juices: tomatoes, pineapple, oranges, lemon/lime, grape fruit  Recommend to do range of motion exercises for both shoulders first   Limit heavy weight lifting  Exercises for Shoulder Abduction and Adduction   WHAT YOU NEED TO KNOW:   Shoulder abduction and adduction exercises work the muscles at the back of your shoulder and your upper back  Call your doctor or physical therapist if:   You have sharp or worsening pain during exercise or at rest     You have questions or concerns about your shoulder exercises  Before you exercise:  Warm up and stretch before you exercise  Walk or ride a stationary bike for 5 to 10 minutes to help you warm up  Stretching helps increase range of motion  It may also decrease muscle soreness and help prevent another injury  Your healthcare provider or physical therapist will tell you which of the following stretches to do:  Crossover arm stretch:  Relax your shoulders  Hold your upper arm with the opposite hand  Pull your arm across your chest until you feel a stretch  Hold the stretch for 30 seconds  Return to the starting position  Shoulder flexion stretch:  Stand facing a wall  Slowly walk your fingers up the wall until you feel a stretch  Hold the stretch for 30 seconds  Return to the starting position  Sleeper stretch:  Lie on your injured side on a firm, flat surface  Bend the elbow of your injured arm 90° with your hand facing up  Use your arm that is not injured to slowly push your injured arm down  Stop when you feel a stretch at the back of your injured shoulder  Hold the stretch for 30 seconds  Slowly return to the starting position  How to exercise with a weight:  Your healthcare provider or physical therapist will tell you how much weight to use  Shoulder abduction:  Stand and hold a weight in your hand with your palm facing your body   Slowly raise "your arm to the side with your thumb pointing up  Then raise your arm over your head as far as you can without pain  Hold this position for as long as directed  Do not raise your arm over your head unless your healthcare provider or physical therapist says it is okay  Shoulder adduction:  Lie on your back on a firm surface  Extend your arm out to a \"T  \" Britni Furnish your elbow so your forearm in the air  Hold a weight in your hand  Slowly raise your arm toward the ceiling and straighten your elbow  Hold this position for as long as directed  Slowly return to the starting position  How to exercise with an exercise band: Your healthcare provider or physical therapist will tell you how much resistance to use  Shoulder abduction:  Wrap the exercise band around a heavy, stable object near your foot  Grab the band with the hand of your injured shoulder  Keep your arm straight  Slowly raise your arm to the side with your thumb pointing up  Then, slowly pull the band over your head as far as you can without pain  Do not raise your arm over your head unless your healthcare provider or physical therapist says it is okay  Do not let your shoulder shrug  Hold this position for as long as directed  Slowly return to the starting position  Shoulder adduction:  Wrap the exercise band around a heavy, stable object  Stand and face away from where the band is anchored  Hold each end of the band in both hands with your elbows bent  Your elbows should not be behind your body  Keep your arms parallel to the floor and slowly straighten your elbows  Hold this position for as long as directed  Slowly return to the starting position  Follow up with your doctor or physical therapist as directed:  Write down your questions so you remember to ask them at your visits  © Copyright [employer] 2022 Information is for End User's use only and may not be sold, redistributed or otherwise used for commercial purposes    The above " information is an  only  It is not intended as medical advice for individual conditions or treatments  Talk to your doctor, nurse or pharmacist before following any medical regimen to see if it is safe and effective for you

## 2023-04-24 NOTE — PROGRESS NOTES
Assessment/Plan:    Difficulty walking  Improved  Atrial fibrillation ([place] [address] ) [I48 [id number]]  Denies any symptoms  On metoprolol, warfarin  RLS (restless legs syndrome)  Stable, taking gabapentin and pramipexole  Per neurology  Stage 3a chronic kidney disease [facility])  Lab Results   Component Value Date    EGFR 49 04/12/2023    EGFR 61 11/09/2022    EGFR 57 06/20/2022    CREATININE 1 06 04/12/2023    CREATININE 0 89 11/09/2022    CREATININE 0 94 06/20/2022     Slightly worse  Will continue to monitor  Avoid NSAIDs  Diabetic polyneuropathy associated with type 2 diabetes mellitus ([place] [address] )    Lab Results   Component Value Date    HGBA1C 5 6 04/12/2023     A1c normal   Not on medication  Carrero's esophagus with dysplasia  On famotidine  Essential hypertension  BP stable  On metoprolol and torsemide  Seasonal allergies  Taking antihistamine daily, using nasal spray prn  Instructed to rinse mouth after using steroid nasal spray  Severe obesity (BMI 35 0-39  9) with comorbidity ([place] [address] )  Weight gain 8 lbs since about a month ago  Reviewed diet, portion control  Status post total knee replacement using cement, right  Ongoing issues  Follow up with Ortho  Recommend to use can e    Chronic diastolic CHF (congestive heart failure) (HCC)  Wt Readings from Last 3 Encounters:   04/24/23 103 kg (228 lb)   03/22/23 103 kg (228 lb)   03/16/23 101 kg (222 lb)     Discussed low salt diet  On daily torsemide  Echo scheduled in June  Sees cardiology  Diagnoses and all orders for this visit:    Chronic diastolic CHF (congestive heart failure) (HCC)  -     Comprehensive metabolic panel; Future    Stage 3a chronic kidney disease (HCC)  -     Comprehensive metabolic panel; Future  -     CBC and differential; Future    Diabetic polyneuropathy associated with type 2 diabetes mellitus ([place] [address] )  -     Lipid panel;  Future  -     Hemoglobin A1C; Future    RLS (restless legs syndrome)    Chronic atrial fibrillation "(HCC)  -     TSH, 3rd generation with Free T4 reflex; Future    Gastroesophageal reflux disease, unspecified whether esophagitis present    Severe obesity (BMI 35 0-39  9) with comorbidity ([address] )    Vitamin D deficiency    Encounter for screening mammogram for breast cancer  -     Mammo screening bilateral w 3d & cad; Future    Follow up in 4 months or as needed  Subjective:      Patient ID: Timur Olivares is a [de-identified] y o  female here for a follow up  She complains of mouth symptoms, feels it is sore but no mouth lesions or bleeding  She tried changing her diet, noticed it feels more irritated when she ate tangerines  She is also asking if using Flonase may be causing her symptoms  She feels area in the front of her mouth but not back/sides is affected the most      She is still having issues with her balance  She still has knee pain, she would hear it \"clunk\" when walking  She has not fallen  She does have a walker and cane at home which she is not using  She complains of right shoulder pain recently  No injury  Pain occurs with certain movements  She has not taken anything for it  She goes to the gym a few days a week, lifts weight with her   She admits snacking frequently at night, before bed time  She had 3 parties in the past week or so, has gained weight  She noticed more frequent fluid retention in the last week  The following portions of the patient's history were reviewed and updated as appropriate: allergies, current medications, past medical history, past social history and problem list     Review of Systems   Constitutional: Negative for appetite change and fatigue  HENT: Negative for congestion, ear pain and postnasal drip  Eyes: Negative for visual disturbance  Respiratory: Negative for cough and shortness of breath  Cardiovascular: Negative for chest pain and leg swelling  Gastrointestinal: Negative for abdominal pain, constipation and diarrhea     Genitourinary: " "Negative for dysuria, frequency and urgency  Musculoskeletal: Positive for arthralgias and gait problem  Negative for back pain, joint swelling and myalgias  Skin: Negative for rash and wound  Neurological: Negative for dizziness, numbness and headaches  Hematological: Does not bruise/bleed easily  Psychiatric/Behavioral: Negative for confusion and sleep disturbance  The patient is not nervous/anxious  Objective:      /80   Pulse 92   Temp (!) 96 5 °F (35 8 °C)   Ht 5' 5\" (1 651 m)   Wt 103 kg (228 lb)   SpO2 96%   BMI 37 94 kg/m²          Physical Exam  Vitals and nursing note reviewed  Constitutional:       General: She is not in acute distress  Appearance: She is well-developed  HENT:      Head: Normocephalic and atraumatic  Mouth/Throat:      Mouth: Mucous membranes are moist  No injury, lacerations or oral lesions  Tongue: No lesions  Pharynx: Oropharynx is clear  No posterior oropharyngeal erythema  Eyes:      Pupils: Pupils are equal, round, and reactive to light  Cardiovascular:      Rate and Rhythm: Normal rate and regular rhythm  Heart sounds: Normal heart sounds  Pulmonary:      Effort: Pulmonary effort is normal       Breath sounds: Normal breath sounds  No wheezing  Abdominal:      General: Bowel sounds are normal       Palpations: Abdomen is soft  Musculoskeletal:         General: No swelling  Right shoulder: Tenderness present  Decreased range of motion  Left shoulder: No tenderness  Normal range of motion  Right lower leg: No edema  Left lower leg: No edema  Skin:     General: Skin is warm  Findings: No rash  Neurological:      General: No focal deficit present  Mental Status: She is alert and oriented to person, place, and time  Psychiatric:         Mood and Affect: Mood and affect normal  Mood is not anxious or depressed           Behavior: Behavior normal            Labs & imaging results " reviewed with patient  BMI Counseling: Body mass index is 37 94 kg/m²  The BMI is above normal  Nutrition recommendations include decreasing overall calorie intake, reducing fast food intake and consuming healthier snacks  Exercise recommendations include joining a gym and strength training exercises

## 2023-05-03 ENCOUNTER — OFFICE VISIT (OUTPATIENT)
Dept: PODIATRY | Facility: CLINIC | Age: 83
End: 2023-05-03

## 2023-05-03 VITALS
SYSTOLIC BLOOD PRESSURE: 102 MMHG | HEIGHT: 65 IN | DIASTOLIC BLOOD PRESSURE: 80 MMHG | WEIGHT: 228 LBS | RESPIRATION RATE: 17 BRPM | BODY MASS INDEX: 37.99 KG/M2

## 2023-05-03 DIAGNOSIS — E11.51 TYPE 2 DIABETES MELLITUS WITH DIABETIC PERIPHERAL ANGIOPATHY WITHOUT GANGRENE, WITHOUT LONG-TERM CURRENT USE OF INSULIN (HCC): Primary | ICD-10-CM

## 2023-05-03 DIAGNOSIS — M79.671 PAIN IN BOTH FEET: ICD-10-CM

## 2023-05-03 DIAGNOSIS — L84 CORNS: ICD-10-CM

## 2023-05-03 DIAGNOSIS — I70.209 PERIPHERAL ARTERIOSCLEROSIS (HCC): ICD-10-CM

## 2023-05-03 DIAGNOSIS — M79.672 PAIN IN BOTH FEET: ICD-10-CM

## 2023-05-03 NOTE — PROGRESS NOTES
Assessment/Plan:  Painful calluses   Diabetic neuropathy   Peripheral vascular disease   Chronic edema   Chronic plantar fasciitis left foot   Deep venous insufficiency         Plan   Chart reviewed  Diabetic foot exam performed   All mycotic nails debrided   Plantar calluses debrided without pain or complication   Procedures performed without pain or complication   Patient will follow with vascular surgery for continued care and work-up of deep venous insufficiency         Subjective   Patient is diabetic   She has pain with walking   She has pain with shoe wear   No history of trauma   Patient suffers from chronic edema of legs   She has had many bouts of cellulitis      Diabetic polyneuropathy associated with type 2 diabetes mellitus (HCC)     Corns     Pain in both feet     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HCC)     Chronic edema   Rule out deep venous insufficiency       Discussion/Summary  The patient was counseled regarding instructions for management,-- prognosis,-- patient and family education,-- risks and benefits of treatment options,-- importance of compliance with treatment  Patient is able to Self-Care  Possible side effects of new medications were reviewed with the patient/guardian today  The treatment plan was reviewed with the patient/guardian  The patient/guardian understands and agrees with the treatment plan      Chief Complaint  Patient has complaint of pain in her toes with ambulation   No history of trauma     History of Present Illness  HPI: Patient is doing well with Cymbalta however she began have facial tingling  She discontinued medicine   However the medication was relieving her neuropathic pain       Review of Systems     ROS reviewed       Active Problems     1  Achilles tendinitis of left lower extremity (016 71) (M76 62)   2  Acquired ankle/foot deformity (916 70) (M21 969)   3  AF (paroxysmal atrial fibrillation) (427 31) (I48 0)   4  Anticoagulant long-term use (H28 61) (Z79 01)   5  Arthritis (716 90) (M19 90)   6  At risk for bone density loss (V49 89) (Z91 89)   7  Atrial fibrillation (427 31) (I48 91)   8  History of Breast Cancer (V10 3)   9  Difficulty walking (719 7) (R26 2)   10  Encounter for screening mammogram for breast cancer (V76 12) (Z12 31)   11  Esophageal reflux (530 81) (K21 9)   12  Foot pain, bilateral (729 5) (M79 671,M79 672)   13  Hiatal hernia (553 3) (K44 9)   14  History of Carrero's esophagus (V12 79) (Z87 19)   15  History of fall (V15 88) (Z91 81)   16  Hypertension (401 9) (I10)   17  Leg pain, left (729 5) (C51 936)   18  Lichen sclerosus et atrophicus (701 0) (L90 0)   19  Lumbar radiculopathy (724 4) (M54 16)   20  Multiple lung nodules (793 19) (R91 8)   21  Obesity (278 00) (E66 9)   22  Onychomycosis (110 1) (B35 1)   23  Osteopenia (733 90) (M85 80)   24  Pacemaker (V45 01) (Z95 0)   25  Peripheral neuropathy (356 9) (G62 9)   26  Pes planus of both feet (734) (M21 41,M21 42)   27  Plantar fasciitis of left foot (728 71) (M72 2)   28  Restless legs syndrome (333 94) (G25 81)     Past Medical History   · History of Abnormal glucose (790 29) (R73 09)   · Atrial fibrillation (427 31) (I48 91)   · History of Breast Cancer (V10 3)   · 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)   · Esophageal reflux (530 81) (K21 9)   · Denied: History of Exposure To STD   · History of Gross hematuria (599 71) (R31 0)   · History of Hematoma (924 9) (T14 8XXA)   · History of Hematoma of lower extremity, right, initial encounter (924 5) (S80 11XA)   · History of backache (V13 59) (Z87 39)   · History of Carrero's esophagus (V12 79) (Z87 19)   · History of cataract (V12 49) (Z86 69)   · History of chest pain (V13 89) (X94 528)   · History of fall (V15 88) (Z91 81)   · History of hematuria (V13 09) (Z87 448)   · History of postmenopausal hormone replacement therapy (V87 49) (Z92 29)   · History of shortness of breath (V13 89) (P72 474)   · History of urinary incontinence (V13 09) (B66 731)   · History of Neck pain (723 1) (M54 2)   · Normal delivery (650) (O80,Z37  9)   · History of Right knee pain (719 46) (M25 561)   · History of Ringing In The Ears (Tinnitus) (388 30)   · History of Skin rash (782 1) (R21)   · History of Traumatic blister of right lower extremity, initial encounter (916 2) (F20 029P)   · History of UTI (lower urinary tract infection) (599 0) (N39 0)     The active problems and past medical history were reviewed and updated today       Surgical History   · Denied: History of Abnormal Pap Smear Of Cervix   · History of Breast Surgery Lumpectomy   · History of Cataract Surgery   · History of Diagnostic Cystoscopy   · History of Excision Lesion Of Meniscus Or Capsule Knee   · History of Pacemaker - Pulse Generator Replacement   · History of Pacemaker Placement   · History of Pacemaker Placement   · History of Radiation Therapy   · History of Total Abdominal Hysterectomy With Removal Of Both Ovaries     The surgical history was reviewed and updated today        Family History  Mother    · Denied: Family history of Alcoholism and drug addiction in family   · Denied: Family history of Anxiety and depression  Father    · Denied: Family history of Alcoholism and drug addiction in family   · Denied: Family history of Anxiety and depression   · Family history of Coronary Artery Disease (V17 49)   · Family history of abdominal aortic aneurysm (V17 49) (Z82 49)  Child    · Denied: Family history of Alcoholism and drug addiction in family   · Denied: Family history of Anxiety and depression  Sibling    · Denied: Family history of Alcoholism and drug addiction in family   · Denied: Family history of Anxiety and depression  Sister    · Family history of Breast Cancer (V16 3)  Maternal Aunt    · Family history of Colon Cancer (V16 0)   · Family history of Colon Cancer (V16 0)  Family History    · Denied: Family history of Diabetes Mellitus   · Denied: Family history of Stroke Syndrome     The family history was reviewed and updated today        Social History      · Being A Social Drinker   · Denied: History of Drug Use   · Former smoker (V15 82) (L40 145)   · 25 pack years, quit age 41   · Marital History - Currently    · Retired From Work   · 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  The social history was reviewed and updated today       The medication list was reviewed and updated today        Allergies  1  duloxetine   2  LevoFLOXacin TABS   3  Cephalexin CAPS   4  Erythromycin Base TABS   5  Keflex TABS   6  Penicillins   7  Sulfa Drugs        Physical Exam  Left Foot: Appearance: Normal except as noted: excessive pronation-- and-- pes planus  Tenderness: None except the lisfranc joint-- and-- medial longitudinal arch  ROM: subtalar motion was restricted  Right Foot: Appearance: Normal except as noted: excessive pronation-- and-- pes planus  Tenderness: None except the lisfranc joint-- and-- medial longitudinal arch  ROM: subtalar motion was restricted   Left Ankle: ROM: limited ROM in all planes   Right Ankle: ROM: limited ROM in all planes   Neurological Exam: performed  Light touch was decreased bilaterally  Vibratory sensation was decreased in both first metatarsophalangeal joints  Deep tendon reflexes: achilles reflex absent bilateraly-- and-- 4/5 L5 testing bilateral    Vascular Exam: performed Dorsalis pedis pulses were diminished bilaterally  Posterior tibial pulses were diminished bilaterally  Dependence rubor was present bilaterally  Capillary refill time was Negative digital hair noted, but-- between 1-3 seconds bilaterally  Toenails: All of the toenails were elongated,-- hypertrophied,-- discolored-- and--   Hyperkeratosis: present on both first toes  Shoe Gear Evaluation: performed ()  Recommendation(s): SAS style       Patient's shoes and socks removed  Right Foot/Ankle   Right Foot Inspection        [facility]  Proprioception: diminished      Vascular  Capillary refills: elevated        Left Foot/Ankle  Left Foot Inspection                    Sensory   Vibration: diminished  Proprioception: diminished     Vascular  Capillary refills: elevated      Patient's shoes and socks removed      Right Foot/Ankle   Right Foot Inspection        Toe Exam: right toe deformity       Sensory   Vibration: diminished        Left Foot/Ankle  Left Foot Inspection        Toe Exam: left toe deformity       Sensory   Vibration: diminished      Assign Risk Category  Deformity present  Loss of protective sensation  Weak pulses  Risk: 2

## 2023-05-05 ENCOUNTER — APPOINTMENT (OUTPATIENT)
Dept: LAB | Facility: CLINIC | Age: 83
End: 2023-05-05

## 2023-05-08 ENCOUNTER — ANTICOAG VISIT (OUTPATIENT)
Dept: CARDIOLOGY CLINIC | Facility: CLINIC | Age: 83
End: 2023-05-08

## 2023-05-08 DIAGNOSIS — I48.20 CHRONIC ATRIAL FIBRILLATION (HCC): Primary | ICD-10-CM

## 2023-05-25 ENCOUNTER — REMOTE DEVICE CLINIC VISIT (OUTPATIENT)
Dept: CARDIOLOGY CLINIC | Facility: CLINIC | Age: 83
End: 2023-05-25

## 2023-05-25 DIAGNOSIS — Z95.0 CARDIAC PACEMAKER IN SITU: Primary | ICD-10-CM

## 2023-05-25 NOTE — PROGRESS NOTES
Results for orders placed or performed in visit on 05/25/23   Cardiac EP device report    Narrative    MDT-SINGLE CHAMBER PPM/ NOT MRI CONDITIONAL  CARELINK TRANSMISSION: BATTERY VOLTAGE NEARING ISABELA-9 MONS  WILL SCHEDULE MONTHLY BATTERY CHECKS   30%  ALL AVAILABLE LEAD PARAMETERS WITHIN NORMAL LIMITS  1 VHR NOTED; EGRAM SHOWING RVR  PT ON METO TART & EF 45% (ECHO 2022)  NORMAL DEVICE FUNCTION   NC

## 2023-06-01 ENCOUNTER — HOSPITAL ENCOUNTER (OUTPATIENT)
Dept: NON INVASIVE DIAGNOSTICS | Facility: CLINIC | Age: 83
Discharge: HOME | End: 2023-06-01

## 2023-06-01 VITALS
HEART RATE: 78 BPM | DIASTOLIC BLOOD PRESSURE: 80 MMHG | HEIGHT: 65 IN | BODY MASS INDEX: 37.83 KG/M2 | SYSTOLIC BLOOD PRESSURE: 102 MMHG | WEIGHT: 227.07 LBS

## 2023-06-01 DIAGNOSIS — I35.0 NONRHEUMATIC AORTIC VALVE STENOSIS: ICD-10-CM

## 2023-06-01 LAB
AORTIC ROOT: 3 CM
AORTIC VALVE MEAN VELOCITY: 23.7 M/S
APICAL FOUR CHAMBER EJECTION FRACTION: 56 %
ASCENDING AORTA: 3.7 CM
AV AREA BY CONTINUOUS VTI: 0.8 CM2
AV AREA PEAK VELOCITY: 0.8 CM2
AV LVOT MEAN GRADIENT: 2 MMHG
AV LVOT PEAK GRADIENT: 3 MMHG
AV MEAN GRADIENT: 25 MMHG
AV PEAK GRADIENT: 41 MMHG
AV VALVE AREA: 0.79 CM2
AV VELOCITY RATIO: 0.29
DOP CALC AO PEAK VEL: 3.19 M/S
DOP CALC AO VTI: 71.99 CM
DOP CALC LVOT AREA: 2.83 CM2
DOP CALC LVOT DIAMETER: 1.9 CM
DOP CALC LVOT PEAK VEL VTI: 19.98 CM
DOP CALC LVOT PEAK VEL: 0.93 M/S
DOP CALC LVOT STROKE INDEX: 26.8 ML/M2
DOP CALC LVOT STROKE VOLUME: 56.62 CM3
FRACTIONAL SHORTENING: 36 % (ref 28–44)
INTERVENTRICULAR SEPTUM IN DIASTOLE (PARASTERNAL SHORT AXIS VIEW): 1 CM
INTERVENTRICULAR SEPTUM: 1 CM (ref 0.6–1.1)
LAAS-AP2: 21.9 CM2
LAAS-AP4: 25.2 CM2
LEFT ATRIUM AREA SYSTOLE SINGLE PLANE A4C: 26.2 CM2
LEFT ATRIUM SIZE: 5 CM
LEFT INTERNAL DIMENSION IN SYSTOLE: 2.8 CM (ref 2.1–4)
LEFT VENTRICULAR INTERNAL DIMENSION IN DIASTOLE: 4.4 CM (ref 3.5–6)
LEFT VENTRICULAR POSTERIOR WALL IN END DIASTOLE: 1 CM
LEFT VENTRICULAR STROKE VOLUME: 57 ML
LVSV (TEICH): 57 ML
RIGHT ATRIUM AREA SYSTOLE A4C: 27.4 CM2
RIGHT VENTRICLE ID DIMENSION: 4.1 CM
SL CV LEFT ATRIUM LENGTH A2C: 5.6 CM
SL CV LV EF: 55
SL CV PED ECHO LEFT VENTRICLE DIASTOLIC VOLUME (MOD BIPLANE) 2D: 88 ML
SL CV PED ECHO LEFT VENTRICLE SYSTOLIC VOLUME (MOD BIPLANE) 2D: 31 ML
TR MAX PG: 25 MMHG
TR PEAK VELOCITY: 2.5 M/S
TRICUSPID ANNULAR PLANE SYSTOLIC EXCURSION: 1.4 CM
TRICUSPID VALVE PEAK REGURGITATION VELOCITY: 2.51 M/S

## 2023-06-02 ENCOUNTER — TELEPHONE (OUTPATIENT)
Dept: CARDIOLOGY CLINIC | Facility: CLINIC | Age: 83
End: 2023-06-02

## 2023-06-07 ENCOUNTER — TELEPHONE (OUTPATIENT)
Dept: INTERNAL MEDICINE CLINIC | Facility: CLINIC | Age: 83
End: 2023-06-07

## 2023-06-07 NOTE — TELEPHONE ENCOUNTER
Pt  has a sore throat-started Mon , and fever, started yesterday  Temp today is 99 8, normal is 97  Taking Coricidin since last night  Covid test neg  Yesterday and the day before  Is wondering if she should take something else for fever and sore throat

## 2023-06-14 DIAGNOSIS — K21.9 GASTROESOPHAGEAL REFLUX DISEASE: ICD-10-CM

## 2023-06-14 RX ORDER — FAMOTIDINE 40 MG/1
TABLET, FILM COATED ORAL
Qty: 90 TABLET | Refills: 1 | Status: SHIPPED | OUTPATIENT
Start: 2023-06-14

## 2023-06-18 NOTE — RESULT NOTES
Discussion/Summary   Negative     Verified Results  (1) TISSUE EXAM 20Sep2017 02:58PM Taco Pineda     Test Name Result Flag Reference   LAB AP CASE REPORT (Report)     Surgical Pathology Report             Case: [id number]                   Authorizing Provider: Isacc Yates MD      Collected:      09/20/2017 1458        Ordering Location:   [address] [place] [address]   Received:      09/21/2017 [facility]                               Pathologist:      Sammy Samayoa MD                                 Specimen:  Esophagus, random esophagus -r/o eoe   LAB AP FINAL DIAGNOSIS      A  Esophagus (random biopsy):    - Squamous mucosa with no pathologic abnormality      - Eosinophils number less than 2 per HPF      - No dysplasia or neoplasia identified  Electronically signed by Sammy Samayoa MD on 9/25/2017 at 12:15 PM   LAB AP NOTE      Interpretation performed at [place] [address]  LAB AP SURGICAL ADDITIONAL INFORMATION (Report)     All controls performed with the immunohistochemical stains reported above   reacted appropriately  These tests were developed and their performance   characteristics determined by [facility] or   [employer]  They may not be cleared or approved by the [place]  Food and Drug Administration  The FDA has determined that such clearance   or approval is not necessary  These tests are used for clinical purposes  They should not be regarded as investigational or for research  This   laboratory has been approved by CLIA 88, designated as a high-complexity   laboratory and is qualified to perform these tests  LAB AP GROSS DESCRIPTION (Report)     A  The specimen is received in formalin, labeled with the patient's name   and hospital number, and is designated esophagus  The specimen consists   of 2 tan soft tissue fragments measuring 0 3 and 0 4 cm in greatest   dimension   Entirely submitted in one cassette     LAB AP CLINICAL INFORMATION Esophageal reflux Yes

## 2023-06-22 ENCOUNTER — OFFICE VISIT (OUTPATIENT)
Dept: OBGYN CLINIC | Facility: CLINIC | Age: 83
End: 2023-06-22
Payer: MEDICARE

## 2023-06-22 VITALS
WEIGHT: 227.4 LBS | SYSTOLIC BLOOD PRESSURE: 117 MMHG | HEIGHT: 65 IN | DIASTOLIC BLOOD PRESSURE: 79 MMHG | HEART RATE: 102 BPM | BODY MASS INDEX: 37.89 KG/M2

## 2023-06-22 DIAGNOSIS — Z96.651 AFTERCARE FOLLOWING RIGHT KNEE JOINT REPLACEMENT SURGERY: ICD-10-CM

## 2023-06-22 DIAGNOSIS — Z47.1 AFTERCARE FOLLOWING RIGHT KNEE JOINT REPLACEMENT SURGERY: ICD-10-CM

## 2023-06-22 DIAGNOSIS — Z96.651 STATUS POST TOTAL RIGHT KNEE REPLACEMENT USING CEMENT: Primary | ICD-10-CM

## 2023-06-22 PROCEDURE — 99214 OFFICE O/P EST MOD 30 MIN: CPT | Performed by: ORTHOPAEDIC SURGERY

## 2023-06-22 NOTE — PROGRESS NOTES
"Assessment/Plan:  1  Status post total right knee replacement using cement        2  Aftercare following right knee joint replacement surgery          Scribe Attestation    I,:  Roc Mishra am acting as a scribe while in the presence of the attending physician :       I,:  Shmuel Pham, DO personally performed the services described in this documentation    as scribed in my presence :         Issa Brice is an 82-year-old female here today for 3-month follow-up of the right knee  She is no longer experiencing pain  I advised her to continue with her activity in the gym, walking etc  I did remind her that she needs to take prophylactic antibiotics before all dental work in the future  At this time we will only see her back as needed  Subjective: 3-month follow-up of right knee    Patient ID: Karen Kwon is a [de-identified] y o  female here today for 3-month follow-up of the right knee  She reports she is no longer experiencing pain and is generally doing well  She states her activity has not been limited other than with stairs likely due to to her right arthroplasty on 3/28/2022  She reports the \"clunking\" she was experiencing has subsided  Review of Systems   Constitutional: Positive for activity change (increased)  Negative for chills and fever  HENT: Negative for ear pain and sore throat  Eyes: Negative for pain and visual disturbance  Respiratory: Negative for cough and shortness of breath  Cardiovascular: Negative for chest pain and palpitations  Gastrointestinal: Negative for abdominal pain and vomiting  Genitourinary: Negative for dysuria and hematuria  Musculoskeletal: Negative for arthralgias and back pain  Skin: Negative for color change and rash  Neurological: Negative for seizures and syncope  All other systems reviewed and are negative          Past Medical History:   Diagnosis Date   • Arthritis    • Atrial fibrillation [facility])    • Carrero's esophagus     last assessed: 1/23/2018 " • BRCA1 negative    • BRCA2 negative    • Breast cancer ([place] [address] ) 2006    stage 1 (left), given adjuvant radiation with Arimidex x 5 years   • Cancer ([place] [address] ) 2006    Left Breast, Lumpectomy   • Cataract     last assessed: 3/11/2014   • Chronic diastolic CHF (congestive heart failure) ([place] [address] ) 11/21/2022   • Dysplasia of toenail     last assessed: 8/29/2017   • Esophageal reflux    • GERD (gastroesophageal reflux disease)    • Gross hematuria     last assessed: 2/19/2015   • Hematuria    • Hiatal hernia    • History of radiation therapy    • Hypertension    • Irregular heart beat     AFIB   • Mixed sensory-motor polyneuropathy    • Neuropathy    • Obesity    • Pacemaker    • Paroxysmal atrial fibrillation ([place] [address] )    • Peripheral neuropathy    • Rectal bleeding    • Restless leg syndrome    • Shortness of breath     last assessed: 1/11/2016       Past Surgical History:   Procedure Laterality Date   • BREAST BIOPSY Left 2006   • BREAST LUMPECTOMY Left 2006    onset: 2006   • BREAST SURGERY     • CARDIAC PACEMAKER PLACEMENT      x 3 2006   • CATARACT EXTRACTION     • COLONOSCOPY     • CYSTOSCOPY  04/04/2014    diagnostic   • HYSTERECTOMY      ALISON BSO; due to fibroid uterus; age 38   • KNEE CARTILAGE SURGERY      excision lesion of meniscus or capsule knee   • KNEE SURGERY     • OOPHORECTOMY Bilateral     age 38   • OTHER SURGICAL HISTORY      radiation therapy   • TX ARTHRP KNE CONDYLE&PLATU MEDIAL&LAT COMPARTMENTS Left 08/17/2020    Procedure: ARTHROPLASTY KNEE TOTAL;  Surgeon: Natividad Shah DO;  Location: [address] [place] [address];  Service: Orthopedics   • TX ARTHRP KNE CONDYLE&PLATU MEDIAL&LAT COMPARTMENTS Right 03/28/2022    Procedure: ARTHROPLASTY KNEE TOTAL W ROBOT - RIGHT;  Surgeon: Natividad Shah DO;  Location: [facility];  Service: Orthopedics   • TX COLONOSCOPY FLX DX W/COLLJ SPEC WHEN PFRMD N/A 02/08/2017    Procedure: COLONOSCOPY;  Surgeon: Alanis Maddox MD;  Location: [facility];   Service: Gastroenterology   • TX ESOPHAGOGASTRODUODENOSCOPY TRANSORAL DIAGNOSTIC N/A 2017    Procedure: ESOPHAGOGASTRODUODENOSCOPY (EGD); Surgeon: Caroline Guerrero MD;  Location: [facility];   Service: Gastroenterology   • UPPER GASTROINTESTINAL ENDOSCOPY         Family History   Problem Relation Age of Onset   • Hypertension Mother    • Heart disease Father    • Aneurysm Father    • Coronary artery disease Father         in his 76s with aneurysm   • Aortic aneurysm Father         abdominal   • Scleroderma Sister    • Breast cancer Sister 76   • Hypertension Sister    • Cancer Sister    • No Known Problems Son    • No Known Problems Son    • Testicular cancer Son 39   • Thyroid cancer Son 45   • Colon cancer Maternal Aunt    • Colon cancer Maternal Aunt    • Breast cancer Other 48        kaylee's daughter   • Alcohol abuse Neg Hx    • Substance Abuse Neg Hx    • Mental illness Neg Hx    • Depression Neg Hx        Social History     Occupational History   • 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      Comment: retired   Tobacco Use   • Smoking status: Former     Packs/day: 1 00     Years: 25 00     Total pack years: 25 00     Types: Cigarettes     Quit date: 1980     Years since quittin 7   • Smokeless tobacco: Never   • Tobacco comments:     Quit over 30 years ago; quit age 41   Vaping Use   • Vaping Use: Never used   Substance and Sexual Activity   • Alcohol use: Not Currently   • Drug use: No   • Sexual activity: Not on file         Current Outpatient Medications:   •  acetaminophen (TYLENOL) 325 mg tablet, Take 3 tablets (975 mg total) by mouth every 8 (eight) hours, Disp: , Rfl: 0  •  Calcium Acetate, Phos Binder, (CALCIUM ACETATE PO), Take by mouth daily  , Disp: , Rfl:   •  clobetasol (TEMOVATE) 0 05 % ointment, Apply once weekly to the affected area, Disp: 30 g, Rfl: 3  •  famotidine (PEPCID) 40 MG tablet, TAKE 1 TABLET BY MOUTH EVERY DAY, Disp: 90 tablet, Rfl: 1  •  fluticasone (FLONASE) 50 mcg/act nasal spray, SPRAY 1 SPRAY INTO EACH NOSTRIL EVERY DAY, Disp: 48 mL, Rfl: 3  •  gabapentin (NEURONTIN) 800 mg tablet, TAKE 1 TABLET BY MOUTH FOUR TIMES A DAY, Disp: 360 tablet, Rfl: 1  •  loratadine (CLARITIN) 10 mg tablet, Take 10 mg by mouth daily, Disp: , Rfl:   •  magnesium 30 MG tablet, Take 30 mg by mouth in the morning, Disp: , Rfl:   •  metoprolol tartrate (LOPRESSOR) 50 mg tablet, TAKE 1 5 TABLETS BY MOUTH 2 TIMES A DAY , Disp: 270 tablet, Rfl: 3  •  multivitamin (THERAGRAN) TABS, Take 1 tablet by mouth daily, Disp: , Rfl:   •  pramipexole (MIRAPEX) 0 5 mg tablet, TAKE 2 FULL TABLETS TWICE A DAY AT 5:00 P  M  AND 10:00 P M , Disp: 360 tablet, Rfl: 1  •  torsemide (DEMADEX) 20 mg tablet, Take 1 tablet (20 mg total) by mouth daily, Disp: 90 tablet, Rfl: 3  •  warfarin (COUMADIN) 7 5 mg tablet, TAKE 1 TABLET BY MOUTH EVERY DAY, Disp: 90 tablet, Rfl: 3    Allergies   Allergen Reactions   • Latex      Added based on information entered during case entry, please review and add reactions, type, and severity as needed   • Cephalexin Rash   • Duloxetine Hcl Other (See Comments)     Facial pins and needles sensation   • Erythromycin Rash   • Levofloxacin Other (See Comments)     Muscular aches   • Penicillins Rash   • Savella [Milnacipran] Rash   • Sulfa Antibiotics Rash       Objective:  Vitals:    06/22/23 1019   BP: 117/79   Pulse: 102       Body mass index is 37 84 kg/m²  Right Knee Exam     Tenderness   The patient is experiencing no tenderness  Range of Motion   Extension: 0   Flexion: 130     Tests   Varus: negative Valgus: negative  Drawer:  Anterior - negative        Other   Erythema: absent  Scars: present  Sensation: normal  Pulse: present  Swelling: none  Effusion: no effusion present    Comments:  Stable at 0, 30, 90  Cutaneous changes consistent with peripheral vascular disease present bilaterally  Vericosities present  No patellar clunk appreciated          Observations     Right Knee   Negative for effusion  Physical Exam  Vitals and nursing note reviewed  Constitutional:       Appearance: Normal appearance  HENT:      Head: Normocephalic and atraumatic  Right Ear: External ear normal       Left Ear: External ear normal       Nose: Nose normal    Eyes:      General: No scleral icterus  Extraocular Movements: Extraocular movements intact  Conjunctiva/sclera: Conjunctivae normal    Cardiovascular:      Rate and Rhythm: Normal rate  Pulmonary:      Effort: Pulmonary effort is normal  No respiratory distress  Musculoskeletal:      Cervical back: Normal range of motion and neck supple  Right knee: No effusion  Comments: See ortho exam   Skin:     General: Skin is warm and dry  Neurological:      Mental Status: She is alert and oriented to person, place, and time  Psychiatric:         Mood and Affect: Mood normal          Behavior: Behavior normal            This document was created using speech voice recognition software  Grammatical errors, random word insertions, pronoun errors, and incomplete sentences are an occasional consequence of this system due to software limitations, ambient noise, and hardware issues  Any formal questions or concerns about content, text, or information contained within the body of this dictation should be directly addressed to the provider for clarification

## 2023-07-05 DIAGNOSIS — I48.20 CHRONIC ATRIAL FIBRILLATION (HCC): Primary | ICD-10-CM

## 2023-07-06 ENCOUNTER — TELEPHONE (OUTPATIENT)
Dept: HEMATOLOGY ONCOLOGY | Facility: CLINIC | Age: 83
End: 2023-07-06

## 2023-07-06 NOTE — TELEPHONE ENCOUNTER
Appointment Change  Cancel, Reschedule, Change to Virtual      Who are you speaking with? Patient   If it is not the patient, are they listed on an active communication consent form? N/A   Which provider is the appointment scheduled with? Yang Terrell PA-C   When is the appointment scheduled? Please list date and time 7/7/23 2:00PM   At which location is the appointment scheduled to take place? [facility]   Was the appointment rescheduled or changed from an in person visit to a virtual visit? If so, please list the details of the change. 9/22/23 10:00AM   What is the reason for the appointment change? Patient wanted appointment pushed back until September   Was STAR transport scheduled for this visit? No   Does STAR transport need to be scheduled for the new visit (if applicable) No   Does the patient need an infusion appointment rescheduled? No   Does the patient have an infusion appointment scheduled? If so, when? No   Is the patient undergoing chemotherapy? No   Was the no-show policy reviewed for appointments being changed with less then 24 hours of notice?  Yes

## 2023-07-07 DIAGNOSIS — I48.19 PERSISTENT ATRIAL FIBRILLATION (HCC): ICD-10-CM

## 2023-07-07 RX ORDER — WARFARIN SODIUM 7.5 MG/1
TABLET ORAL
Qty: 90 TABLET | Refills: 3 | Status: SHIPPED | OUTPATIENT
Start: 2023-07-07

## 2023-07-10 ENCOUNTER — APPOINTMENT (OUTPATIENT)
Dept: LAB | Facility: CLINIC | Age: 83
End: 2023-07-10
Payer: MEDICARE

## 2023-07-11 ENCOUNTER — ANTICOAG VISIT (OUTPATIENT)
Dept: CARDIOLOGY CLINIC | Facility: CLINIC | Age: 83
End: 2023-07-11

## 2023-07-11 DIAGNOSIS — I48.20 CHRONIC ATRIAL FIBRILLATION (HCC): Primary | ICD-10-CM

## 2023-07-17 ENCOUNTER — REMOTE DEVICE CLINIC VISIT (OUTPATIENT)
Dept: CARDIOLOGY CLINIC | Facility: CLINIC | Age: 83
End: 2023-07-17

## 2023-07-17 DIAGNOSIS — Z95.0 PRESENCE OF CARDIAC PACEMAKER: Primary | ICD-10-CM

## 2023-07-17 PROCEDURE — RECHECK: Performed by: INTERNAL MEDICINE

## 2023-07-17 NOTE — PROGRESS NOTES
Results for orders placed or performed in visit on 07/17/23   Cardiac EP device report    Narrative    MDT-SINGLE CHAMBER PPM/ NOT MRI CONDITIONAL  NON-BILLABLE CARELINK TRANSMISSION: BATTERY STATUS: BATTERY VOLTAGE NEARING ISABELA (8 MOS~<1-19 MOS). WILL SCHEDULE MONTHLY BATTERY CHECKS. : 29.3%. ALL AVAILABLE LEAD PARAMETERS WITHIN NORMAL LIMITS. [address] [place] [address] 167-226 BPM, MAX DURATION 2 MINS 55 SECS. PT TAKES WARFARIN, METOPROLOL TART. EF: 55% (ECHO 6/1/23). NORMAL DEVICE FUNCTION.   [id number] [address]

## 2023-07-19 ENCOUNTER — OFFICE VISIT (OUTPATIENT)
Dept: PODIATRY | Facility: CLINIC | Age: 83
End: 2023-07-19
Payer: MEDICARE

## 2023-07-19 VITALS
RESPIRATION RATE: 17 BRPM | HEIGHT: 65 IN | DIASTOLIC BLOOD PRESSURE: 79 MMHG | WEIGHT: 227 LBS | BODY MASS INDEX: 37.82 KG/M2 | SYSTOLIC BLOOD PRESSURE: 117 MMHG

## 2023-07-19 DIAGNOSIS — I70.209 PERIPHERAL ARTERIOSCLEROSIS (HCC): ICD-10-CM

## 2023-07-19 DIAGNOSIS — L84 CORNS: ICD-10-CM

## 2023-07-19 DIAGNOSIS — M79.671 PAIN IN BOTH FEET: ICD-10-CM

## 2023-07-19 DIAGNOSIS — I87.2 DEEP VENOUS INSUFFICIENCY: ICD-10-CM

## 2023-07-19 DIAGNOSIS — E11.51 TYPE 2 DIABETES MELLITUS WITH DIABETIC PERIPHERAL ANGIOPATHY WITHOUT GANGRENE, WITHOUT LONG-TERM CURRENT USE OF INSULIN (HCC): Primary | ICD-10-CM

## 2023-07-19 DIAGNOSIS — R60.9 CHRONIC EDEMA: ICD-10-CM

## 2023-07-19 DIAGNOSIS — M79.672 PAIN IN BOTH FEET: ICD-10-CM

## 2023-07-19 PROCEDURE — 11056 PARNG/CUTG B9 HYPRKR LES 2-4: CPT | Performed by: PODIATRIST

## 2023-07-19 NOTE — PROGRESS NOTES
Assessment/Plan:  Painful calluses.  Diabetic neuropathy.  Peripheral vascular disease.  Chronic edema.  Chronic plantar fasciitis left foot.  Deep venous insufficiency.        Plan.  Chart reviewed. Diabetic foot exam performed.  All mycotic nails debrided.  Plantar calluses debrided without pain or complication.  Procedures performed without pain or complication.  Patient will follow with vascular surgery for continued care and work-up of deep venous insufficiency.        Subjective.  Patient is diabetic.  She has pain with walking.  She has pain with shoe wear.  No history of trauma.  Patient suffers from chronic edema of legs.  She has had many bouts of cellulitis.     Diabetic polyneuropathy associated with type 2 diabetes mellitus (HCC)     Corns     Pain in both feet     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HCC)     Chronic edema.  Rule out deep venous insufficiency       Discussion/Summary  The patient was counseled regarding instructions for management,-- prognosis,-- patient and family education,-- risks and benefits of treatment options,-- importance of compliance with treatment. Patient is able to Self-Care. Possible side effects of new medications were reviewed with the patient/guardian today. The treatment plan was reviewed with the patient/guardian. The patient/guardian understands and agrees with the treatment plan      Chief Complaint  Patient has complaint of pain in her toes with ambulation.  No history of trauma     History of Present Illness  HPI: Patient is doing well with Cymbalta however she began have facial tingling. She discontinued medicine.  However the medication was relieving her neuropathic pain.      Review of Systems     ROS reviewed.      Active Problems     1. Achilles tendinitis of left lower extremity (726.71) (M76.62)   2. Acquired ankle/foot deformity (736.70) (M21.969)   3. AF (paroxysmal atrial fibrillation) (427.31) (I48.0)   4. Anticoagulant long-term use (V58.61) (Z79.01)   5. Arthritis (716.90) (M19.90)   6. At risk for bone density loss (V49.89) (Z91.89)   7. Atrial fibrillation (427.31) (I48.91)   8. History of Breast Cancer (V10.3)   9. Difficulty walking (719.7) (R26.2)   10. Encounter for screening mammogram for breast cancer (V76.12) (Z12.31)   11. Esophageal reflux (530.81) (K21.9)   12. Foot pain, bilateral (729.5) (M79.671,M79.672)   13. Hiatal hernia (553.3) (K44.9)   14. History of Carrero's esophagus (V12.79) (Z87.19)   15. History of fall (V15.88) (Z91.81)   16. Hypertension (401.9) (I10)   17. Leg pain, left (729.5) (Z16.278)   18. 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 Lumbar radiculopathy (724.4) (M54.16)   20. Multiple lung nodules (793.19) (R91.8)   21. Obesity (278.00) (E66.9)   22. Onychomycosis (110.1) (B35.1)   23. Osteopenia (733.90) (M85.80)   24. Pacemaker (V45.01) (Z95.0)   25. Peripheral neuropathy (356.9) (G62.9)   26. Pes planus of both feet (734) (M21.41,M21.42)   27. Plantar fasciitis of left foot (728.71) (M72.2)   28. Restless legs syndrome (333.94) (G25.81)     Past Medical History   · History of Abnormal glucose (790.29) (R73.09)   · Atrial fibrillation (427.31) (I48.91)   · History of Breast Cancer (V10.3)   · 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.8) (Q84.6)   · Esophageal reflux (530.81) (K21.9)   · Denied: History of Exposure To STD   · History of Gross hematuria (599.71) (R31.0)   · History of Hematoma (924.9) (T14.8XXA)   · History of Hematoma of lower extremity, right, initial encounter (924.5) (S80.11XA)   · History of backache (V13.59) (Z87.39)   · History of Carrero's esophagus (V12.79) (Z87.19)   · History of cataract (V12.49) (Z86.69)   · History of chest pain (V13.89) (Z22.288)   · History of fall (V15.88) (Z91.81)   · History of hematuria (V13.09) (Z87.448)   · History of postmenopausal hormone replacement therapy (V87.49) (Z92.29)   · History of shortness of breath (V13.89) (N45.727)   · History of urinary incontinence (V13.09) (J13.658)   · History of Neck pain (723.1) (M54.2)   · Normal delivery (650) (O80,Z37. 9)   · History of Right knee pain (719.46) (M25.561)   · History of Ringing In The Ears (Tinnitus) (388.30)   · History of Skin rash (782.1) (R21)   · History of Traumatic blister of right lower extremity, initial encounter (916.2) (Y94.372L)   · History of UTI (lower urinary tract infection) (599.0) (N39.0)     The active problems and past medical history were reviewed and updated today.      Surgical History   · Denied: History of Abnormal Pap Smear Of Cervix   · History of Breast Surgery Lumpectomy   · History of Cataract Surgery   · History of Diagnostic Cystoscopy   · History of Excision Lesion Of Meniscus Or Capsule Knee   · History of Pacemaker - Pulse Generator Replacement   · History of Pacemaker Placement   · History of Pacemaker Placement   · History of Radiation Therapy   · History of Total Abdominal Hysterectomy With Removal Of Both Ovaries     The surgical history was reviewed and updated today.       Family History  Mother    · Denied: Family history of Alcoholism and drug addiction in family   · Denied: Family history of Anxiety and depression  Father    · Denied: Family history of Alcoholism and drug addiction in family   · Denied: Family history of Anxiety and depression   · Family history of Coronary Artery Disease (V17.49)   · Family history of abdominal aortic aneurysm (V17.49) (Z82.49)  Child    · Denied: Family history of Alcoholism and drug addiction in family   · Denied: Family history of Anxiety and depression  Sibling    · Denied: Family history of Alcoholism and drug addiction in family   · Denied: Family history of Anxiety and depression  Sister    · Family history of Breast Cancer (V16.3)  Maternal Aunt    · Family history of Colon Cancer (V16.0)   · Family history of Colon Cancer (V16.0)  Family History    · Denied: Family history of Diabetes Mellitus   · Denied: Family history of Stroke Syndrome     The family history was reviewed and updated today.       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      The medication list was reviewed and updated today.       Allergies  1. duloxetine   2. LevoFLOXacin TABS   3. Cephalexin CAPS   4. Erythromycin Base TABS   5. Keflex TABS   6. Penicillins   7. Sulfa Drugs        Physical Exam  Left Foot: Appearance: Normal except as noted: excessive pronation-- and-- pes planus. Tenderness: None except the lisfranc joint-- and-- medial longitudinal arch. ROM: subtalar motion was restricted. Right Foot: Appearance: Normal except as noted: excessive pronation-- and-- pes planus. Tenderness: None except the lisfranc joint-- and-- medial longitudinal arch. ROM: subtalar motion was restricted . Left Ankle: ROM: limited ROM in all planes   Right Ankle: ROM: limited ROM in all planes   Neurological Exam: performed. Light touch was decreased bilaterally. Vibratory sensation was decreased in both first metatarsophalangeal joints. Deep tendon reflexes: achilles reflex absent bilateraly-- and-- 4/5 L5 testing bilateral.   Vascular Exam: performed Dorsalis pedis pulses were diminished bilaterally. Posterior tibial pulses were diminished bilaterally. Dependence rubor was present bilaterally. Capillary refill time was Negative digital hair noted, but-- between 1-3 seconds bilaterally. Toenails: All of the toenails were elongated,-- hypertrophied,-- discolored-- and-- . Hyperkeratosis: present on both first toes. Shoe Gear Evaluation: performed (). Recommendation(s): SAS style.      Patient's shoes and socks removed. Right Foot/Ankle   Right Foot Inspection        Durel Bath  Proprioception: diminished      Vascular  Capillary refills: elevated        Left Foot/Ankle  Left Foot Inspection                    Sensory   Vibration: diminished  Proprioception: diminished     Vascular  Capillary refills: elevated.     Patient's shoes and socks removed.     Right Foot/Ankle   Right Foot Inspection        Toe Exam: right toe deformity.      Sensory   Vibration: diminished        Left Foot/Ankle  Left Foot Inspection        Toe Exam: left toe deformity.      Sensory   Vibration: diminished      Assign Risk Category  Deformity present  Loss of protective sensation  Weak pulses  Risk: 2

## 2023-07-25 ENCOUNTER — APPOINTMENT (OUTPATIENT)
Dept: LAB | Facility: CLINIC | Age: 83
End: 2023-07-25
Payer: MEDICARE

## 2023-07-26 ENCOUNTER — ANTICOAG VISIT (OUTPATIENT)
Dept: CARDIOLOGY CLINIC | Facility: CLINIC | Age: 83
End: 2023-07-26

## 2023-07-26 DIAGNOSIS — I48.20 CHRONIC ATRIAL FIBRILLATION (HCC): Primary | ICD-10-CM

## 2023-07-28 ENCOUNTER — OFFICE VISIT (OUTPATIENT)
Dept: CARDIOLOGY CLINIC | Facility: CLINIC | Age: 83
End: 2023-07-28
Payer: MEDICARE

## 2023-07-28 ENCOUNTER — TELEPHONE (OUTPATIENT)
Dept: CARDIOLOGY CLINIC | Facility: CLINIC | Age: 83
End: 2023-07-28

## 2023-07-28 VITALS
SYSTOLIC BLOOD PRESSURE: 118 MMHG | HEIGHT: 65 IN | HEART RATE: 86 BPM | DIASTOLIC BLOOD PRESSURE: 76 MMHG | BODY MASS INDEX: 37.65 KG/M2 | WEIGHT: 226 LBS

## 2023-07-28 DIAGNOSIS — I48.20 CHRONIC ATRIAL FIBRILLATION (HCC): Primary | ICD-10-CM

## 2023-07-28 PROCEDURE — 93000 ELECTROCARDIOGRAM COMPLETE: CPT | Performed by: INTERNAL MEDICINE

## 2023-07-28 PROCEDURE — 99214 OFFICE O/P EST MOD 30 MIN: CPT | Performed by: INTERNAL MEDICINE

## 2023-07-28 NOTE — PATIENT INSTRUCTIONS
Please touch base with Kailyn before starting the Xarelto because we need the coumadin to be washed out of your system. You will receive a phone call from Vin Joiner regarding our TAVR program for your aortic stenosis.

## 2023-07-28 NOTE — TELEPHONE ENCOUNTER
----- Message from Esther Chester sent at 7/28/2023 11:42 AM EDT -----    ----- Message -----  From: Phoebe Guerra MD  Sent: 7/28/2023  11:36 AM EDT  To: Walker Hernandez would like to switch to Xarelto. I'm going to order it through her pharmacy/make sure its covered.

## 2023-07-29 DIAGNOSIS — G25.81 RLS (RESTLESS LEGS SYNDROME): ICD-10-CM

## 2023-07-31 RX ORDER — PRAMIPEXOLE DIHYDROCHLORIDE 0.5 MG/1
TABLET ORAL
Qty: 360 TABLET | Refills: 1 | Status: SHIPPED | OUTPATIENT
Start: 2023-07-31

## 2023-08-01 ENCOUNTER — TELEPHONE (OUTPATIENT)
Dept: CARDIOLOGY CLINIC | Facility: CLINIC | Age: 83
End: 2023-08-01

## 2023-08-01 NOTE — TELEPHONE ENCOUNTER
Spoke with pharmacist. Chidi Quiñonez is covered by her insurance. ----- Message from Elliott Sykes sent at 7/28/2023 12:19 PM EDT -----    ----- Message -----  From: Maru Torito: 7/28/2023  11:42 AM EDT  To: Elliott Sykes      ----- Message -----  From: Yefri Lerma MD  Sent: 7/28/2023  11:36 AM EDT  To: Lisha Parsons would like to switch to Xarelto. I'm going to order it through her pharmacy/make sure its covered.

## 2023-08-02 ENCOUNTER — ANTICOAG VISIT (OUTPATIENT)
Dept: CARDIOLOGY CLINIC | Facility: CLINIC | Age: 83
End: 2023-08-02

## 2023-08-02 DIAGNOSIS — I48.20 CHRONIC ATRIAL FIBRILLATION (HCC): Primary | ICD-10-CM

## 2023-08-16 ENCOUNTER — OFFICE VISIT (OUTPATIENT)
Dept: CARDIAC SURGERY | Facility: CLINIC | Age: 83
End: 2023-08-16
Payer: MEDICARE

## 2023-08-16 ENCOUNTER — TELEPHONE (OUTPATIENT)
Dept: CARDIOLOGY CLINIC | Facility: CLINIC | Age: 83
End: 2023-08-16

## 2023-08-16 VITALS
DIASTOLIC BLOOD PRESSURE: 56 MMHG | HEART RATE: 83 BPM | OXYGEN SATURATION: 95 % | WEIGHT: 229.3 LBS | TEMPERATURE: 96.9 F | HEIGHT: 65 IN | SYSTOLIC BLOOD PRESSURE: 106 MMHG | BODY MASS INDEX: 38.2 KG/M2

## 2023-08-16 DIAGNOSIS — I35.0 NONRHEUMATIC AORTIC VALVE STENOSIS: Primary | ICD-10-CM

## 2023-08-16 DIAGNOSIS — N18.30 STAGE 3 CHRONIC KIDNEY DISEASE, UNSPECIFIED WHETHER STAGE 3A OR 3B CKD (HCC): ICD-10-CM

## 2023-08-16 DIAGNOSIS — Z13.6 ENCOUNTER FOR SCREENING FOR STENOSIS OF CAROTID ARTERY: ICD-10-CM

## 2023-08-16 PROCEDURE — 99204 OFFICE O/P NEW MOD 45 MIN: CPT | Performed by: THORACIC SURGERY (CARDIOTHORACIC VASCULAR SURGERY)

## 2023-08-16 NOTE — H&P (VIEW-ONLY)
Consultation - Cardiothoracic Surgery   Sony Ortiz 82 y.o. female MRN: [id number]    Physician Requesting Consult: No att. providers found    Reason for Consult / Principal Problem: Aortic stenosis, Non-Rheumatic    History of Present Illness: Sony Ortiz is a 82y.o. year old female who presents for initial outpatient surgical consultation for symptomatic severe aortic stenosis. She has been following with Dr. Fadi Weller, and her most recent ECHO from  demonstrates moderate to severe AS with mean and peak gradients of 25 mmHg and 41 mmHg respectively. She was therefore referred for TAVR evaluation. Her PMHx is significant for HTN, PAF (ablation in , multiple failed cardioversions, on Xarelto), obesity (BMI 38), multiple lung nodules, GERD, Carrero's esophagus, venous insufficiency, lumbar radiculopathy, peripheral neuropathy, left breast cancer (s/p lumpectomy and radiation treatment in ), restless leg syndrome, PPM for SSS. Upon interview today, patient reports that she has been experiencing chest discomfort, dyspnea on exertion with walking, and much more so with inclines, fatigue, and orthopnea. She is on a diuretic, and takes it most days, except when she is traveling. She lives in a 2-story home with her , and her son lives next door. She is independent with her ADL's, walks with a cane, and drives. She is a former smoker, quit 25 years ago. Denies drinking alcohol or illicit substance use. Family history significant for her dad who underwent an aortic aneurysm repair in , and subsequently  from postop complications. She is COVID vaccinated. She has regular appointments with her dentist, and does have an upcoming appointment.      Past Medical History:  Past Medical History:   Diagnosis Date   • Arthritis    • Atrial fibrillation ([address])    • Carrero's esophagus     last assessed: 2018   • BRCA1 negative    • BRCA2 negative    • Breast cancer ([address])     stage 1 (left), given adjuvant radiation with Arimidex x 5 years   • Cancer [facility]) 2006    Left Breast, Lumpectomy   • Cataract     last assessed: 3/11/2014   • Chronic diastolic CHF (congestive heart failure) ([address]) 11/21/2022   • Dysplasia of toenail     last assessed: 8/29/2017   • Esophageal reflux    • GERD (gastroesophageal reflux disease)    • Gross hematuria     last assessed: 2/19/2015   • Hematuria    • Hiatal hernia    • History of radiation therapy    • Hypertension    • Irregular heart beat     AFIB   • Mixed sensory-motor polyneuropathy    • Neuropathy    • Obesity    • Pacemaker    • Paroxysmal atrial fibrillation ([address])    • Peripheral neuropathy    • Rectal bleeding    • Restless leg syndrome    • Shortness of breath     last assessed: 1/11/2016         Past Surgical History:   Past Surgical History:   Procedure Laterality Date   • BREAST BIOPSY Left 2006   • BREAST LUMPECTOMY Left 2006    onset: 2006   • BREAST SURGERY     • CARDIAC PACEMAKER PLACEMENT      x 3 2006   • CATARACT EXTRACTION     • COLONOSCOPY     • CYSTOSCOPY  04/04/2014    diagnostic   • HYSTERECTOMY      ALISON BSO; due to fibroid uterus; age 38   • KNEE CARTILAGE SURGERY      excision lesion of meniscus or capsule knee   • KNEE SURGERY     • OOPHORECTOMY Bilateral     age 38   • OTHER SURGICAL HISTORY      radiation therapy   • [place] ARTHRP KNE CONDYLE&PLATU MEDIAL&LAT COMPARTMENTS Left 08/17/2020    Procedure: ARTHROPLASTY KNEE TOTAL;  Surgeon: Adela Adan DO;  [facility];  Service: Orthopedics   • [place] ARTHRP KNE CONDYLE&PLATU MEDIAL&LAT COMPARTMENTS Right 03/28/2022    Procedure: ARTHROPLASTY KNEE TOTAL W ROBOT - RIGHT;  Surgeon: Adela Adan DO;  Location: [facility];  Service: Orthopedics   • [place] COLONOSCOPY FLX DX W/COLLJ SPEC WHEN PFRMD N/A 02/08/2017    Procedure: COLONOSCOPY;  Surgeon: Alice Teran MD;  Location:  GI LAB;   Service: Gastroenterology   • [place] ESOPHAGOGASTRODUODENOSCOPY TRANSORAL DIAGNOSTIC N/A 09/20/2017 Procedure: ESOPHAGOGASTRODUODENOSCOPY (EGD); Surgeon: Kamilah Souza MD;  Location: [facility]; Service: Gastroenterology   • UPPER GASTROINTESTINAL ENDOSCOPY           Family History:  Family History   Problem Relation Age of Onset   • Hypertension Mother    • Heart disease Father    • Aneurysm Father    • Coronary artery disease Father         in his 76s with aneurysm   • Aortic aneurysm Father         abdominal   • Scleroderma Sister    • Breast cancer Sister 76   • Hypertension Sister    • Cancer Sister    • No Known Problems Son    • No Known Problems Son    • Testicular cancer Son 39   • Thyroid cancer Son 45   • Colon cancer Maternal Aunt    • Colon cancer Maternal Aunt    • Breast cancer Other 48        kaylee's daughter   • Alcohol abuse Neg Hx    • Substance Abuse Neg Hx    • Mental illness Neg Hx    • Depression Neg Hx          Social History:    Social History     Substance and Sexual Activity   Alcohol Use Not Currently     Social History     Substance and Sexual Activity   Drug Use No     Social History     Tobacco Use   Smoking Status Former   • Packs/day: 1.00   • Years: 25.00   • Total pack years: 25.00   • Types: Cigarettes   • Quit date: 1980   • Years since quittin.9   Smokeless Tobacco Never   Tobacco Comments    Quit over 30 years ago; quit age 41         Home Medications:   Prior to Admission medications    Medication Sig Start Date End Date Taking?  Authorizing Provider   acetaminophen (TYLENOL) 325 mg tablet Take 3 tablets (975 mg total) by mouth every 8 (eight) hours 3/29/22   Sunny Galarza PA-C   Calcium Acetate, Phos Binder, (CALCIUM ACETATE PO) Take by mouth daily      Historical Provider, MD   clobetasol (TEMOVATE) 0.05 % ointment Apply once weekly to the affected area 23   Bertrand King PA-C   famotidine (PEPCID) 40 MG tablet TAKE 1 TABLET BY MOUTH EVERY DAY 23   Irma Chen MD   fluticasone (FLONASE) 50 mcg/act nasal spray SPRAY 1 SPRAY INTO [facility] NOSTRIL EVERY DAY 3/14/23   Vidal Johnson MD   gabapentin (NEURONTIN) 800 mg tablet TAKE 1 TABLET BY MOUTH FOUR TIMES A DAY 1/9/23   Kailee Guillen MD   loratadine (CLARITIN) 10 mg tablet Take 10 mg by mouth daily    Historical Provider, MD   magnesium 30 MG tablet Take 30 mg by mouth in the morning    Historical Provider, MD   metoprolol tartrate (LOPRESSOR) 50 mg tablet TAKE 1.5 TABLETS BY MOUTH 2 TIMES A DAY. 9/19/22   Patricia Michelle MD   multivitamin (THERAGRAN) TABS Take 1 tablet by mouth daily    Historical Provider, MD   pramipexole (MIRAPEX) 0.5 mg tablet TAKE 2 FULL TABLETS TWICE A DAY AT 5:00 P. M. AND 10:00 P. M. 7/31/23   Kailee Guillen MD   rivaroxaban (Xarelto) 20 mg tablet Take 1 tablet (20 mg total) by mouth daily with breakfast 7/28/23   Patricia Michelle MD   torsemide [facility]) 20 mg tablet Take 1 tablet (20 mg total) by mouth daily 11/23/22   Patricia Michelle MD       Allergies:   Allergies   Allergen Reactions   • Latex      Added based on information entered during case entry, please review and add reactions, type, and severity as needed   • Cephalexin Rash   • Duloxetine Hcl Other (See Comments)     Facial pins and needles sensation   • Erythromycin Rash   • Levofloxacin Other (See Comments)     Muscular aches   • Penicillins Rash   • Savella [Milnacipran] Rash   • Sulfa Antibiotics Rash       Review of Systems:  Review of Systems - History obtained from chart review and the patient  General ROS: positive for  - fatigue and change in activity tolerance   Psychological ROS: negative  Ophthalmic ROS: positive for - uses glasses  ENT ROS: negative  Allergy and Immunology ROS: negative  Hematological and Lymphatic ROS: negative  Endocrine ROS: negative  Respiratory ROS: positive for - shortness of breath  Cardiovascular ROS: positive for - dyspnea on exertion, edema, irregular heartbeat, murmur, orthopnea and paroxysmal nocturnal dyspnea  negative for - loss of consciousness or rapid heart rate  Gastrointestinal ROS: no abdominal pain, change in bowel habits, or black or bloody stools  Genito-Urinary ROS: no dysuria, trouble voiding, or hematuria  Musculoskeletal ROS: negative  Neurological ROS: no TIA or stroke symptoms  Dermatological ROS: negative    Vital Signs:     Vitals:    08/16/23 1021   BP: 106/56   BP Location: Right arm   Patient Position: Sitting   Cuff Size: Large   Pulse: 83   Temp: (!) 96.9 °F (36.1 °C)   TempSrc: Tympanic   SpO2: 95%   Weight: 104 kg (229 lb 4.8 oz)   Height: 5' 5" (1.651 m)       Physical Exam:    General: alert, oriented, NAD   HEENT/NECK:  PERRL. No jugular venous distention. Cardiac:Regular rate and rhythm   Carotid arteries: 2+, no bruits   Pulmonary:  Breath sounds clear bilaterally. Abdomen:  Non-tender, Non-distended. Positive bowel sounds. Upper extremities: 2+ radial pulses; brisk capillary refill  Lower extremities: Extremities warm/dry. PT/DP pulses 2+ bilaterally. 2+ edema B/L  Neuro: Alert and oriented X 3. Sensation is grossly intact. No focal deficits. Musculoskeletal: DONOHUE  Skin: Warm/Dry, without rashes or lesions. Lab Results:               Invalid input(s): "LABGLOM"      Lab Results   Component Value Date    HGBA1C 5.6 04/12/2023     Lab Results   Component Value Date    TROPONINI <0.02 06/06/2019       Imaging Studies:     Echocardiogram: 6/1/23  Interpretation Summary       •  Left Ventricle: Left ventricular cavity size is normal. Wall thickness is normal. The left ventricular ejection fraction is 55%. Systolic function is normal. Wall motion is normal.  •  Right Ventricle: Right ventricular cavity size is mildly dilated. •  Left Atrium: The atrium is moderately dilated. •  Right Atrium: The atrium is moderately dilated. •  Aortic Valve: The aortic valve is trileaflet. The leaflets are not thickened. The leaflets are moderately calcified. There is moderately reduced mobility. There is moderate to severe stenosis.   • Mitral Valve: There is mild regurgitation. •  Tricuspid Valve: There is mild to moderate regurgitation. •  Pulmonic Valve: There is mild regurgitation. I have personally reviewed pertinent reports. TAVR evaluation Assessment:     [address]: III    Aortic Stenosis Stage: D3    5 Meter Walk:   1. 8 sec   2. 8 sec   3. 9 sec     STS risk score (preliminary): 9.1%    KCCQ-12 completed    Assessment:  Patient Active Problem List    Diagnosis Date Noted   • Status post total right knee replacement using cement 03/22/2023   • Chronic diastolic CHF (congestive heart failure) ([address]) 11/21/2022   • Chronic anticoagulation 05/22/2022   • Status post total knee replacement using cement, right 03/28/2022   • Stage 3a chronic kidney disease ([address]) 10/06/2021   • Exotropia of right eye 07/12/2021   • Seasonal allergies 06/03/2021   • Leg swelling 08/18/2020   • Status post total knee replacement using cement, left 75/46/4805   • Lichen sclerosus et atrophicus of the vulva 05/14/2020   • Colon polyps 11/18/2019   • Chronic edema 05/08/2019   • Deep venous insufficiency 05/08/2019   • Pacemaker    • GERD (gastroesophageal reflux disease)    • Mild cognitive impairment 09/14/2018   • Vitamin D deficiency 03/12/2018   • Carrero's esophagus with dysplasia 03/12/2018   • Sensory polyneuropathy 02/28/2018   • RLS (restless legs syndrome) 02/28/2018   • Acquired deformity of foot 02/13/2018   • Corns 02/13/2018   • Diabetic polyneuropathy associated with type 2 diabetes mellitus ([address]) 02/13/2018   • 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[address]) 02/13/2018   • Radiculopathy of lumbar region 02/13/2018   • Atrial fibrillation ([address]) [I48.91] 01/20/2018   • Dense breast tissue on mammogram 12/07/2017   • Difficulty walking 08/29/2017   • Flat foot 08/29/2017   • Onychomycosis 08/29/2017   • Hiatal hernia    • Multiple lung nodules 03/11/2015   • Severe obesity (BMI 35.0-39. 9) with comorbidity ([address]) 04/16/2014   • Osteopenia of multiple sites 04/16/2014   • Arthritis 03/11/2014   • Essential hypertension 25/10/1162   • Lichen sclerosus et atrophicus 05/08/2013   • Peripheral neuropathy 05/08/2013     Severe aortic stenosis; Ongoing TAVR workup    Plan:    Josh Vilchis has symptomatic severe aortic stenosis. They will undergo the following testing for transcatheter aortic valve replacement: Gated CTA of the chest/abdomen/pelvis,cardiac catheterization, dental clearance, and carotid artery ultrasound. Once these studies have been completed, Josh Vilchis will follow up in our office to review the results and to be evaluated to confirm the suitability of proceeding with transcatheter aortic valve replacment. Josh Vilchis was comfortable with our recommendations, and their questions were answered to their satisfaction. Thank you for allowing us to participate in the care of this patient.      Routine referral to gastroenterology for colonoscopy screening was not indicated, as the patient is over 78years old    SIGNATURE: Kristen Walton PA-C  DATE: August 16, 2023  TIME: 11:02 AM

## 2023-08-16 NOTE — PROGRESS NOTES
Consultation - Cardiothoracic Surgery   Mary Spears 82 y.o. female MRN: [id number]    Physician Requesting Consult: No att. providers found    Reason for Consult / Principal Problem: Aortic stenosis, Non-Rheumatic    History of Present Illness: Mary Spears is a 82y.o. year old female who presents for initial outpatient surgical consultation for symptomatic severe aortic stenosis. She has been following with Dr. Nunu Hand, and her most recent ECHO from  demonstrates moderate to severe AS with mean and peak gradients of 25 mmHg and 41 mmHg respectively. She was therefore referred for TAVR evaluation. Her PMHx is significant for HTN, PAF (ablation in , multiple failed cardioversions, on Xarelto), obesity (BMI 38), multiple lung nodules, GERD, Carrero's esophagus, venous insufficiency, lumbar radiculopathy, peripheral neuropathy, left breast cancer (s/p lumpectomy and radiation treatment in ), restless leg syndrome, PPM for SSS. Upon interview today, patient reports that she has been experiencing chest discomfort, dyspnea on exertion with walking, and much more so with inclines, fatigue, and orthopnea. She is on a diuretic, and takes it most days, except when she is traveling. She lives in a 2-story home with her , and her son lives next door. She is independent with her ADL's, walks with a cane, and drives. She is a former smoker, quit 25 years ago. Denies drinking alcohol or illicit substance use. Family history significant for her dad who underwent an aortic aneurysm repair in , and subsequently  from postop complications. She is COVID vaccinated. She has regular appointments with her dentist, and does have an upcoming appointment.      Past Medical History:  Past Medical History:   Diagnosis Date   • Arthritis    • Atrial fibrillation ([address])    • Carrero's esophagus     last assessed: 2018   • BRCA1 negative    • BRCA2 negative    • Breast cancer ([address])     stage 1 (left), given adjuvant radiation with Arimidex x 5 years   • Cancer [facility]) 2006    Left Breast, Lumpectomy   • Cataract     last assessed: 3/11/2014   • Chronic diastolic CHF (congestive heart failure) ([address]) 11/21/2022   • Dysplasia of toenail     last assessed: 8/29/2017   • Esophageal reflux    • GERD (gastroesophageal reflux disease)    • Gross hematuria     last assessed: 2/19/2015   • Hematuria    • Hiatal hernia    • History of radiation therapy    • Hypertension    • Irregular heart beat     AFIB   • Mixed sensory-motor polyneuropathy    • Neuropathy    • Obesity    • Pacemaker    • Paroxysmal atrial fibrillation ([address])    • Peripheral neuropathy    • Rectal bleeding    • Restless leg syndrome    • Shortness of breath     last assessed: 1/11/2016         Past Surgical History:   Past Surgical History:   Procedure Laterality Date   • BREAST BIOPSY Left 2006   • BREAST LUMPECTOMY Left 2006    onset: 2006   • BREAST SURGERY     • CARDIAC PACEMAKER PLACEMENT      x 3 2006   • CATARACT EXTRACTION     • COLONOSCOPY     • CYSTOSCOPY  04/04/2014    diagnostic   • HYSTERECTOMY      ALISON BSO; due to fibroid uterus; age 38   • KNEE CARTILAGE SURGERY      excision lesion of meniscus or capsule knee   • KNEE SURGERY     • OOPHORECTOMY Bilateral     age 38   • OTHER SURGICAL HISTORY      radiation therapy   • ME ARTHRP KNE CONDYLE&PLATU MEDIAL&LAT COMPARTMENTS Left 08/17/2020    Procedure: ARTHROPLASTY KNEE TOTAL;  Surgeon: Rakesh Rico DO;  Location: [facility];  Service: Orthopedics   • ME ARTHRP KNE CONDYLE&PLATU MEDIAL&LAT COMPARTMENTS Right 03/28/2022    Procedure: ARTHROPLASTY KNEE TOTAL W ROBOT - RIGHT;  Surgeon: Rakesh Rico DO;  Location: [facility];  Service: Orthopedics   • ME COLONOSCOPY FLX DX W/COLLJ SPEC WHEN PFRMD N/A 02/08/2017    Procedure: COLONOSCOPY;  Surgeon: Adele Mccabe MD;  Location:  GI LAB;   Service: Gastroenterology   • ME ESOPHAGOGASTRODUODENOSCOPY TRANSORAL DIAGNOSTIC N/A 09/20/2017 Procedure: ESOPHAGOGASTRODUODENOSCOPY (EGD); Surgeon: Raghav Cardona MD;  Location: [facility]; Service: Gastroenterology   • UPPER GASTROINTESTINAL ENDOSCOPY           Family History:  Family History   Problem Relation Age of Onset   • Hypertension Mother    • Heart disease Father    • Aneurysm Father    • Coronary artery disease Father         in his 76s with aneurysm   • Aortic aneurysm Father         abdominal   • Scleroderma Sister    • Breast cancer Sister 76   • Hypertension Sister    • Cancer Sister    • No Known Problems Son    • No Known Problems Son    • Testicular cancer Son 39   • Thyroid cancer Son 45   • Colon cancer Maternal Aunt    • Colon cancer Maternal Aunt    • Breast cancer Other 48        kaylee's daughter   • Alcohol abuse Neg Hx    • Substance Abuse Neg Hx    • Mental illness Neg Hx    • Depression Neg Hx          Social History:    Social History     Substance and Sexual Activity   Alcohol Use Not Currently     Social History     Substance and Sexual Activity   Drug Use No     Social History     Tobacco Use   Smoking Status Former   • Packs/day: 1.00   • Years: 25.00   • Total pack years: 25.00   • Types: Cigarettes   • Quit date: 1980   • Years since quittin.9   Smokeless Tobacco Never   Tobacco Comments    Quit over 30 years ago; quit age 41         Home Medications:   Prior to Admission medications    Medication Sig Start Date End Date Taking?  Authorizing Provider   acetaminophen (TYLENOL) 325 mg tablet Take 3 tablets (975 mg total) by mouth every 8 (eight) hours 3/29/22   Deniz Andrews PA-C   Calcium Acetate, Phos Binder, (CALCIUM ACETATE PO) Take by mouth daily      Historical Provider, MD   clobetasol (TEMOVATE) 0.05 % ointment Apply once weekly to the affected area 23   Stephen King PA-C   famotidine (PEPCID) 40 MG tablet TAKE 1 TABLET BY MOUTH EVERY DAY 23   Elijah Nelson MD   fluticasone (FLONASE) 50 mcg/act nasal spray SPRAY 1 SPRAY INTO [facility] NOSTRIL EVERY DAY 3/14/23   Riddhi Flynn MD   gabapentin (NEURONTIN) 800 mg tablet TAKE 1 TABLET BY MOUTH FOUR TIMES A DAY 1/9/23   Yang Henderson MD   loratadine (CLARITIN) 10 mg tablet Take 10 mg by mouth daily    Historical Provider, MD   magnesium 30 MG tablet Take 30 mg by mouth in the morning    Historical Provider, MD   metoprolol tartrate (LOPRESSOR) 50 mg tablet TAKE 1.5 TABLETS BY MOUTH 2 TIMES A DAY. 9/19/22   Eliane Zee MD   multivitamin (THERAGRAN) TABS Take 1 tablet by mouth daily    Historical Provider, MD   pramipexole (MIRAPEX) 0.5 mg tablet TAKE 2 FULL TABLETS TWICE A DAY AT 5:00 P. M. AND 10:00 P. M. 7/31/23   Yang Henderson MD   rivaroxaban (Xarelto) 20 mg tablet Take 1 tablet (20 mg total) by mouth daily with breakfast 7/28/23   Eliane Zee MD   torsemide [facility]) 20 mg tablet Take 1 tablet (20 mg total) by mouth daily 11/23/22   Eliane Zee MD       Allergies:   Allergies   Allergen Reactions   • Latex      Added based on information entered during case entry, please review and add reactions, type, and severity as needed   • Cephalexin Rash   • Duloxetine Hcl Other (See Comments)     Facial pins and needles sensation   • Erythromycin Rash   • Levofloxacin Other (See Comments)     Muscular aches   • Penicillins Rash   • Savella [Milnacipran] Rash   • Sulfa Antibiotics Rash       Review of Systems:  Review of Systems - History obtained from chart review and the patient  General ROS: positive for  - fatigue and change in activity tolerance   Psychological ROS: negative  Ophthalmic ROS: positive for - uses glasses  ENT ROS: negative  Allergy and Immunology ROS: negative  Hematological and Lymphatic ROS: negative  Endocrine ROS: negative  Respiratory ROS: positive for - shortness of breath  Cardiovascular ROS: positive for - dyspnea on exertion, edema, irregular heartbeat, murmur, orthopnea and paroxysmal nocturnal dyspnea  negative for - loss of consciousness or rapid heart rate  Gastrointestinal ROS: no abdominal pain, change in bowel habits, or black or bloody stools  Genito-Urinary ROS: no dysuria, trouble voiding, or hematuria  Musculoskeletal ROS: negative  Neurological ROS: no TIA or stroke symptoms  Dermatological ROS: negative    Vital Signs:     Vitals:    08/16/23 1021   BP: 106/56   BP Location: Right arm   Patient Position: Sitting   Cuff Size: Large   Pulse: 83   Temp: (!) 96.9 °F (36.1 °C)   TempSrc: Tympanic   SpO2: 95%   Weight: 104 kg (229 lb 4.8 oz)   Height: 5' 5" (1.651 m)       Physical Exam:    General: alert, oriented, NAD   HEENT/NECK:  PERRL. No jugular venous distention. Cardiac:Regular rate and rhythm   Carotid arteries: 2+, no bruits   Pulmonary:  Breath sounds clear bilaterally. Abdomen:  Non-tender, Non-distended. Positive bowel sounds. Upper extremities: 2+ radial pulses; brisk capillary refill  Lower extremities: Extremities warm/dry. PT/DP pulses 2+ bilaterally. 2+ edema B/L  Neuro: Alert and oriented X 3. Sensation is grossly intact. No focal deficits. Musculoskeletal: DONOHUE  Skin: Warm/Dry, without rashes or lesions. Lab Results:               Invalid input(s): "LABGLOM"      Lab Results   Component Value Date    HGBA1C 5.6 04/12/2023     Lab Results   Component Value Date    TROPONINI <0.02 06/06/2019       Imaging Studies:     Echocardiogram: 6/1/23  Interpretation Summary       •  Left Ventricle: Left ventricular cavity size is normal. Wall thickness is normal. The left ventricular ejection fraction is 55%. Systolic function is normal. Wall motion is normal.  •  Right Ventricle: Right ventricular cavity size is mildly dilated. •  Left Atrium: The atrium is moderately dilated. •  Right Atrium: The atrium is moderately dilated. •  Aortic Valve: The aortic valve is trileaflet. The leaflets are not thickened. The leaflets are moderately calcified. There is moderately reduced mobility. There is moderate to severe stenosis.   • Mitral Valve: There is mild regurgitation. •  Tricuspid Valve: There is mild to moderate regurgitation. •  Pulmonic Valve: There is mild regurgitation. I have personally reviewed pertinent reports. TAVR evaluation Assessment:     [address]: III    Aortic Stenosis Stage: D3    5 Meter Walk:   1. 8 sec   2. 8 sec   3. 9 sec     STS risk score (preliminary): 9.1%    KCCQ-12 completed    Assessment:  Patient Active Problem List    Diagnosis Date Noted   • Status post total right knee replacement using cement 03/22/2023   • Chronic diastolic CHF (congestive heart failure) ([address]) 11/21/2022   • Chronic anticoagulation 05/22/2022   • Status post total knee replacement using cement, right 03/28/2022   • Stage 3a chronic kidney disease ([address]) 10/06/2021   • Exotropia of right eye 07/12/2021   • Seasonal allergies 06/03/2021   • Leg swelling 08/18/2020   • Status post total knee replacement using cement, left 30/95/2817   • Lichen sclerosus et atrophicus of the vulva 05/14/2020   • Colon polyps 11/18/2019   • Chronic edema 05/08/2019   • Deep venous insufficiency 05/08/2019   • Pacemaker    • GERD (gastroesophageal reflux disease)    • Mild cognitive impairment 09/14/2018   • Vitamin D deficiency 03/12/2018   • Carrero's esophagus with dysplasia 03/12/2018   • Sensory polyneuropathy 02/28/2018   • RLS (restless legs syndrome) 02/28/2018   • Acquired deformity of foot 02/13/2018   • Corns 02/13/2018   • Diabetic polyneuropathy associated with type 2 diabetes mellitus ([address]) 02/13/2018   • 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[address]) 02/13/2018   • Radiculopathy of lumbar region 02/13/2018   • Atrial fibrillation ([address]) [I48.91] 01/20/2018   • Dense breast tissue on mammogram 12/07/2017   • Difficulty walking 08/29/2017   • Flat foot 08/29/2017   • Onychomycosis 08/29/2017   • Hiatal hernia    • Multiple lung nodules 03/11/2015   • Severe obesity (BMI 35.0-39. 9) with comorbidity ([address]) 04/16/2014   • Osteopenia of multiple sites 04/16/2014   • Arthritis 03/11/2014   • Essential hypertension 55/69/3783   • Lichen sclerosus et atrophicus 05/08/2013   • Peripheral neuropathy 05/08/2013     Severe aortic stenosis; Ongoing TAVR workup    Plan:    Lory Shetty has symptomatic severe aortic stenosis. They will undergo the following testing for transcatheter aortic valve replacement: Gated CTA of the chest/abdomen/pelvis,cardiac catheterization, dental clearance, and carotid artery ultrasound. Once these studies have been completed, Lory Shetty will follow up in our office to review the results and to be evaluated to confirm the suitability of proceeding with transcatheter aortic valve replacment. Lory Shetty was comfortable with our recommendations, and their questions were answered to their satisfaction. Thank you for allowing us to participate in the care of this patient.      Routine referral to gastroenterology for colonoscopy screening was not indicated, as the patient is over 78years old    SIGNATURE: Collette Householder, PA-C  DATE: August 16, 2023  TIME: 11:02 AM

## 2023-08-16 NOTE — LETTER
August 16, 2023     Rachid Elaine MD  [facility]  [address] [address]    Patient: Keary Boeck   YOB: 1940   Date of Visit: 8/16/2023       Dear Dr. Peña Herrera: Thank you for referring Marta De La Fuente to me for evaluation. Below are my notes for this consultation. If you have questions, please do not hesitate to call me. I look forward to following your patient along with you. Sincerely,        Radha Buckley, DO        CC: MD Jane Tripathi PA-C  8/16/2023 11:17 AM  Attested  Consultation - Cardiothoracic Surgery   Keary Boeck 82 y.o. female MRN: [id number]    Physician Requesting Consult: No att. providers found    Reason for Consult / Principal Problem: Aortic stenosis, Non-Rheumatic    History of Present Illness: Keary Boeck is a 82y.o. year old female who presents for initial outpatient surgical consultation for symptomatic severe aortic stenosis. She has been following with Dr. Lillian Landa, and her most recent ECHO from June demonstrates moderate to severe AS with mean and peak gradients of 25 mmHg and 41 mmHg respectively. She was therefore referred for TAVR evaluation. Her PMHx is significant for HTN, PAF (ablation in 2005, multiple failed cardioversions, on Xarelto), obesity (BMI 38), multiple lung nodules, GERD, Carrero's esophagus, venous insufficiency, lumbar radiculopathy, peripheral neuropathy, left breast cancer (s/p lumpectomy and radiation treatment in 2006), restless leg syndrome, PPM for SSS. Upon interview today, patient reports that she has been experiencing chest discomfort, dyspnea on exertion with walking, and much more so with inclines, fatigue, and orthopnea. She is on a diuretic, and takes it most days, except when she is traveling. She lives in a 2-story home with her , and her son lives next door. She is independent with her ADL's, walks with a cane, and drives.  She is a former smoker, quit 25 years ago. Denies drinking alcohol or illicit substance use. Family history significant for her dad who underwent an aortic aneurysm repair in , and subsequently  from postop complications. She is COVID vaccinated. She has regular appointments with her dentist, and does have an upcoming appointment.      Past Medical History:  Past Medical History:   Diagnosis Date   • Arthritis    • Atrial fibrillation ([address])    • Carrero's esophagus     last assessed: 2018   • BRCA1 negative    • BRCA2 negative    • Breast cancer ([address]) 2006    stage 1 (left), given adjuvant radiation with Arimidex x 5 years   • Cancer ([address]) 2006    Left Breast, Lumpectomy   • Cataract     last assessed: 3/11/2014   • Chronic diastolic CHF (congestive heart failure) ([address]) 2022   • Dysplasia of toenail     last assessed: 2017   • Esophageal reflux    • GERD (gastroesophageal reflux disease)    • Gross hematuria     last assessed: 2015   • Hematuria    • Hiatal hernia    • History of radiation therapy    • Hypertension    • Irregular heart beat     AFIB   • Mixed sensory-motor polyneuropathy    • Neuropathy    • Obesity    • Pacemaker    • Paroxysmal atrial fibrillation ([address])    • Peripheral neuropathy    • Rectal bleeding    • Restless leg syndrome    • Shortness of breath     last assessed: 2016         Past Surgical History:   Past Surgical History:   Procedure Laterality Date   • BREAST BIOPSY Left    • BREAST LUMPECTOMY Left     onset:    • BREAST SURGERY     • CARDIAC PACEMAKER PLACEMENT      x 3    • CATARACT EXTRACTION     • COLONOSCOPY     • CYSTOSCOPY  2014    diagnostic   • HYSTERECTOMY      ALISON BSO; due to fibroid uterus; age 38   • KNEE CARTILAGE SURGERY      excision lesion of meniscus or capsule knee   • KNEE SURGERY     • OOPHORECTOMY Bilateral     age 38   • OTHER SURGICAL HISTORY      radiation therapy   • ID ARTHRP KNE CONDYLE&PLATU MEDIAL&LAT COMPARTMENTS Left 2020 Procedure: ARTHROPLASTY KNEE TOTAL;  Surgeon: Helen Vega DO;  Location: [facility];  Service: Orthopedics   • ID ARTHRP KNE CONDYLE&PLATU MEDIAL&LAT COMPARTMENTS Right 2022    Procedure: ARTHROPLASTY KNEE TOTAL W ROBOT - RIGHT;  Surgeon: Helen Vega DO;  Location: [facility];  Service: Orthopedics   • ID COLONOSCOPY FLX DX W/COLLJ SPEC WHEN PFRMD N/A 2017    Procedure: COLONOSCOPY;  Surgeon: Zara Kawasaki, MD;  Location:  GI LAB; Service: Gastroenterology   • ID ESOPHAGOGASTRODUODENOSCOPY TRANSORAL DIAGNOSTIC N/A 2017    Procedure: ESOPHAGOGASTRODUODENOSCOPY (EGD); Surgeon: Shireen Garner MD;  Location:  GI LAB;   Service: Gastroenterology   • UPPER GASTROINTESTINAL ENDOSCOPY           Family History:  Family History   Problem Relation Age of Onset   • Hypertension Mother    • Heart disease Father    • Aneurysm Father    • Coronary artery disease Father         in his 76s with aneurysm   • Aortic aneurysm Father         abdominal   • Scleroderma Sister    • Breast cancer Sister 76   • Hypertension Sister    • Cancer Sister    • No Known Problems Son    • No Known Problems Son    • Testicular cancer Son 39   • Thyroid cancer Son 45   • Colon cancer Maternal Aunt    • Colon cancer Maternal Aunt    • Breast cancer Other 48        kaylee's daughter   • Alcohol abuse Neg Hx    • Substance Abuse Neg Hx    • Mental illness Neg Hx    • Depression Neg Hx          Social History:    Social History     Substance and Sexual Activity   Alcohol Use Not Currently     Social History     Substance and Sexual Activity   Drug Use No     Social History     Tobacco Use   Smoking Status Former   • Packs/day: 1.00   • Years: 25.00   • Total pack years: 25.00   • Types: Cigarettes   • Quit date: 1980   • Years since quittin.9   Smokeless Tobacco Never   Tobacco Comments    Quit over 30 years ago; quit age 41         Home Medications:   Prior to Admission medications    Medication Sig Start Date End Date Taking? Authorizing Provider   acetaminophen (TYLENOL) 325 mg tablet Take 3 tablets (975 mg total) by mouth every 8 (eight) hours 3/29/22   Amy Ocasio PA-C   Calcium Acetate, Phos Binder, (CALCIUM ACETATE PO) Take by mouth daily      Historical Provider, MD   clobetasol (TEMOVATE) 0.05 % ointment Apply once weekly to the affected area 1/18/23   Alen King PA-C   famotidine (PEPCID) 40 MG tablet TAKE 1 TABLET BY MOUTH EVERY DAY 6/14/23   Carlos Garcia MD   fluticasone (FLONASE) 50 mcg/act nasal spray SPRAY 1 SPRAY INTO EACH NOSTRIL EVERY DAY 3/14/23   Yandy Beth MD   gabapentin (NEURONTIN) 800 mg tablet TAKE 1 TABLET BY MOUTH FOUR TIMES A DAY 1/9/23   William Mckee MD   loratadine (CLARITIN) 10 mg tablet Take 10 mg by mouth daily    Historical Provider, MD   magnesium 30 MG tablet Take 30 mg by mouth in the morning    Historical Provider, MD   metoprolol tartrate (LOPRESSOR) 50 mg tablet TAKE 1.5 TABLETS BY MOUTH 2 TIMES A DAY. 9/19/22   Luciano Cross MD   multivitamin (THERAGRAN) TABS Take 1 tablet by mouth daily    Historical Provider, MD   pramipexole (MIRAPEX) 0.5 mg tablet TAKE 2 FULL TABLETS TWICE A DAY AT 5:00 P. M. AND 10:00 P. M. 7/31/23   William Mckee MD   rivaroxaban (Xarelto) 20 mg tablet Take 1 tablet (20 mg total) by mouth daily with breakfast 7/28/23   Luciano Cross MD   torsemide [facility]) 20 mg tablet Take 1 tablet (20 mg total) by mouth daily 11/23/22   Luciano Cross MD       Allergies:   Allergies   Allergen Reactions   • Latex      Added based on information entered during case entry, please review and add reactions, type, and severity as needed   • Cephalexin Rash   • Duloxetine Hcl Other (See Comments)     Facial pins and needles sensation   • Erythromycin Rash   • Levofloxacin Other (See Comments)     Muscular aches   • Penicillins Rash   • Savella [Milnacipran] Rash   • Sulfa Antibiotics Rash       Review of Systems:  Review of Systems - History obtained from chart review and the patient  General ROS: positive for  - fatigue and change in activity tolerance   Psychological ROS: negative  Ophthalmic ROS: positive for - uses glasses  ENT ROS: negative  Allergy and Immunology ROS: negative  Hematological and Lymphatic ROS: negative  Endocrine ROS: negative  Respiratory ROS: positive for - shortness of breath  Cardiovascular ROS: positive for - dyspnea on exertion, edema, irregular heartbeat, murmur, orthopnea and paroxysmal nocturnal dyspnea  negative for - loss of consciousness or rapid heart rate  Gastrointestinal ROS: no abdominal pain, change in bowel habits, or black or bloody stools  Genito-Urinary ROS: no dysuria, trouble voiding, or hematuria  Musculoskeletal ROS: negative  Neurological ROS: no TIA or stroke symptoms  Dermatological ROS: negative    Vital Signs:     Vitals:    08/16/23 1021   BP: 106/56   BP Location: Right arm   Patient Position: Sitting   Cuff Size: Large   Pulse: 83   Temp: (!) 96.9 °F (36.1 °C)   TempSrc: Tympanic   SpO2: 95%   Weight: 104 kg (229 lb 4.8 oz)   Height: 5' 5" (1.651 m)       Physical Exam:    General: alert, oriented, NAD   HEENT/NECK:  PERRL. No jugular venous distention. Cardiac:Regular rate and rhythm   Carotid arteries: 2+, no bruits   Pulmonary:  Breath sounds clear bilaterally. Abdomen:  Non-tender, Non-distended. Positive bowel sounds. Upper extremities: 2+ radial pulses; brisk capillary refill  Lower extremities: Extremities warm/dry. PT/DP pulses 2+ bilaterally. 2+ edema B/L  Neuro: Alert and oriented X 3. Sensation is grossly intact. No focal deficits. Musculoskeletal: DONOHUE  Skin: Warm/Dry, without rashes or lesions.       Lab Results:               Invalid input(s): "LABGLOM"      Lab Results   Component Value Date    HGBA1C 5.6 04/12/2023     Lab Results   Component Value Date    TROPONINI <0.02 06/06/2019       Imaging Studies:     Echocardiogram: 6/1/23  Interpretation Summary       •  Left Ventricle: Left ventricular cavity size is normal. Wall thickness is normal. The left ventricular ejection fraction is 55%. Systolic function is normal. Wall motion is normal.  •  Right Ventricle: Right ventricular cavity size is mildly dilated. •  Left Atrium: The atrium is moderately dilated. •  Right Atrium: The atrium is moderately dilated. •  Aortic Valve: The aortic valve is trileaflet. The leaflets are not thickened. The leaflets are moderately calcified. There is moderately reduced mobility. There is moderate to severe stenosis. •  Mitral Valve: There is mild regurgitation. •  Tricuspid Valve: There is mild to moderate regurgitation. •  Pulmonic Valve: There is mild regurgitation. I have personally reviewed pertinent reports.       TAVR evaluation Assessment:     [address]: III    Aortic Stenosis Stage: D3    5 Meter Walk:   1. 8 sec   2. 8 sec   3. 9 sec     STS risk score (preliminary): 9.1%    KCCQ-12 completed    Assessment:  Patient Active Problem List    Diagnosis Date Noted   • Status post total right knee replacement using cement 03/22/2023   • Chronic diastolic CHF (congestive heart failure) ([address]) 11/21/2022   • Chronic anticoagulation 05/22/2022   • Status post total knee replacement using cement, right 03/28/2022   • Stage 3a chronic kidney disease ([address]) 10/06/2021   • Exotropia of right eye 07/12/2021   • Seasonal allergies 06/03/2021   • Leg swelling 08/18/2020   • Status post total knee replacement using cement, left 21/38/7220   • Lichen sclerosus et atrophicus of the vulva 05/14/2020   • Colon polyps 11/18/2019   • Chronic edema 05/08/2019   • Deep venous insufficiency 05/08/2019   • Pacemaker    • GERD (gastroesophageal reflux disease)    • Mild cognitive impairment 09/14/2018   • Vitamin D deficiency 03/12/2018   • Carrero's esophagus with dysplasia 03/12/2018   • Sensory polyneuropathy 02/28/2018   • RLS (restless legs syndrome) 02/28/2018   • Acquired deformity of foot 02/13/2018   • Corns 02/13/2018   • Diabetic polyneuropathy associated with type 2 diabetes mellitus ([address]) 02/13/2018   • 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[address]) 02/13/2018   • Radiculopathy of lumbar region 02/13/2018   • Atrial fibrillation ([address]) [I48.91] 01/20/2018   • Dense breast tissue on mammogram 12/07/2017   • Difficulty walking 08/29/2017   • Flat foot 08/29/2017   • Onychomycosis 08/29/2017   • Hiatal hernia    • Multiple lung nodules 03/11/2015   • Severe obesity (BMI 35.0-39. 9) with comorbidity ([address]) 04/16/2014   • Osteopenia of multiple sites 04/16/2014   • Arthritis 03/11/2014   • Essential hypertension 82/92/3308   • Lichen sclerosus et atrophicus 05/08/2013   • Peripheral neuropathy 05/08/2013     Severe aortic stenosis; Ongoing TAVR workup    Plan:    Jesus Patel has symptomatic severe aortic stenosis. They will undergo the following testing for transcatheter aortic valve replacement: Gated CTA of the chest/abdomen/pelvis,cardiac catheterization, dental clearance, and carotid artery ultrasound. Once these studies have been completed, Jesus Patel will follow up in our office to review the results and to be evaluated to confirm the suitability of proceeding with transcatheter aortic valve replacment. Jesus Patel was comfortable with our recommendations, and their questions were answered to their satisfaction. Thank you for allowing us to participate in the care of this patient. Routine referral to gastroenterology for colonoscopy screening was not indicated, as the patient is over 78years old    SIGNATURE: Juliette King  DATE: August 16, 2023  TIME: 11:02 AM  Attestation signed by Zuly Coronel DO at 8/16/2023 11:29 AM:  I supervised the Advanced Practitioner. ? I performed, in its entirety, the assessment/plan component of the visit. I agree with the Advanced Practitioner's note with the following additions/exceptions:      Mrs. Ortiz was seen in the office today accompanied by her friend. She was sent to evaluate her aortic stenosis. Her echocardiogram was reviewed by myself personally and findings shared with her. This demonstrates severe aortic calcific stenosis. We discussed treatment options and I recommended TAVR. The risks, benefits, and alternatives to TAVR been discussed with her. She consents and is willing to proceed.     Davey Le, DO 08/16/23

## 2023-08-17 ENCOUNTER — PREP FOR PROCEDURE (OUTPATIENT)
Dept: CARDIOLOGY CLINIC | Facility: CLINIC | Age: 83
End: 2023-08-17

## 2023-08-17 DIAGNOSIS — I35.0 NONRHEUMATIC AORTIC VALVE STENOSIS: Primary | ICD-10-CM

## 2023-08-17 NOTE — TELEPHONE ENCOUNTER
Patient scheduled for R[facility] at [facility] on 9/5/2023 with Dr England. Patient aware of general instructions, mail out with labs order.   Meds holds: Xarelto to hold the morning of the procedure (pt takes it in the morning)  Torsemide to hold the morning of the procedure. patient cover under medicare.

## 2023-08-19 NOTE — TELEPHONE ENCOUNTER
Left message to inform patient that Yadira Louis will not be seeing patients on Thursday anymore at [facility]  Only the first Friday of every month  Isi Sanabria will also be available at [facility] every week 
(1) Other Medications/None

## 2023-08-21 ENCOUNTER — TELEPHONE (OUTPATIENT)
Dept: INTERNAL MEDICINE CLINIC | Facility: CLINIC | Age: 83
End: 2023-08-21

## 2023-08-21 ENCOUNTER — REMOTE DEVICE CLINIC VISIT (OUTPATIENT)
Dept: CARDIOLOGY CLINIC | Facility: CLINIC | Age: 83
End: 2023-08-21

## 2023-08-21 DIAGNOSIS — Z95.0 PRESENCE OF CARDIAC PACEMAKER: Primary | ICD-10-CM

## 2023-08-21 PROCEDURE — RECHECK: Performed by: INTERNAL MEDICINE

## 2023-08-21 NOTE — TELEPHONE ENCOUNTER
Frank Villafana is not required. Verified that patient has Medicare primary with a supplement secondary.

## 2023-08-21 NOTE — TELEPHONE ENCOUNTER
Pt was parked on a hill and the car door shut on her right leg about 30 minutes ago. She has a golf ball sized lump on her lower leg. No open wounds. It is black and blue. Patient is concerned because she's on a blood thinner. Please advise.

## 2023-08-21 NOTE — PROGRESS NOTES
Results for orders placed or performed in visit on 08/21/23   Cardiac EP device report    Narrative    MDT-SINGLE CHAMBER PPM/ NOT MRI CONDITIONAL  NON-BILLABLE CARELINK TRANSMISSION:BATTERY STATUS:  BATTERY VOLTAGE NEARING ISABELA (9 MOS~<1-20 MOS)  WILL SCHEDULE MONTHLY BATTERY CHECKS. : 29.5%. ALL AVAILABLE LEAD PARAMETERS WITHIN NORMAL LIMITS. 1 VHR EPISODE W/ EGM SHOWING PROB RVR @ 202 BPM, DURATION 48 SECS. PT TAKES XARELTO, METOPROLOL TART. EF: 55% (ECHO 6/1/23). NORMAL DEVICE FUNCTION.   [id number] [address]

## 2023-08-21 NOTE — TELEPHONE ENCOUNTER
You can apply ice right away if you are home now. You can also go to an Urgent or ER if having severe pain or any kind of bleeding in the area.

## 2023-08-22 ENCOUNTER — OFFICE VISIT (OUTPATIENT)
Dept: INTERNAL MEDICINE CLINIC | Facility: CLINIC | Age: 83
End: 2023-08-22
Payer: MEDICARE

## 2023-08-22 ENCOUNTER — APPOINTMENT (OUTPATIENT)
Dept: LAB | Facility: CLINIC | Age: 83
End: 2023-08-22
Payer: MEDICARE

## 2023-08-22 VITALS
OXYGEN SATURATION: 97 % | HEART RATE: 96 BPM | HEIGHT: 65 IN | DIASTOLIC BLOOD PRESSURE: 80 MMHG | TEMPERATURE: 97.2 F | BODY MASS INDEX: 37.99 KG/M2 | SYSTOLIC BLOOD PRESSURE: 110 MMHG | WEIGHT: 228 LBS

## 2023-08-22 DIAGNOSIS — I48.20 CHRONIC ATRIAL FIBRILLATION (HCC): ICD-10-CM

## 2023-08-22 DIAGNOSIS — N18.31 STAGE 3A CHRONIC KIDNEY DISEASE (HCC): ICD-10-CM

## 2023-08-22 DIAGNOSIS — E11.42 DIABETIC POLYNEUROPATHY ASSOCIATED WITH TYPE 2 DIABETES MELLITUS (HCC): ICD-10-CM

## 2023-08-22 DIAGNOSIS — I35.0 NONRHEUMATIC AORTIC VALVE STENOSIS: ICD-10-CM

## 2023-08-22 DIAGNOSIS — Z79.01 CHRONIC ANTICOAGULATION: ICD-10-CM

## 2023-08-22 DIAGNOSIS — N18.30 STAGE 3 CHRONIC KIDNEY DISEASE, UNSPECIFIED WHETHER STAGE 3A OR 3B CKD (HCC): ICD-10-CM

## 2023-08-22 DIAGNOSIS — I50.32 CHRONIC DIASTOLIC CHF (CONGESTIVE HEART FAILURE) (HCC): ICD-10-CM

## 2023-08-22 DIAGNOSIS — S80.12XA TRAUMATIC HEMATOMA OF LEFT LOWER LEG, INITIAL ENCOUNTER: Primary | ICD-10-CM

## 2023-08-22 LAB
ALBUMIN SERPL BCP-MCNC: 4.4 G/DL (ref 3.5–5)
ALP SERPL-CCNC: 67 U/L (ref 34–104)
ALT SERPL W P-5'-P-CCNC: 16 U/L (ref 7–52)
ANION GAP SERPL CALCULATED.3IONS-SCNC: 6 MMOL/L
AST SERPL W P-5'-P-CCNC: 23 U/L (ref 13–39)
BASOPHILS # BLD AUTO: 0.03 THOUSANDS/ÂΜL (ref 0–0.1)
BASOPHILS NFR BLD AUTO: 1 % (ref 0–1)
BILIRUB SERPL-MCNC: 1.36 MG/DL (ref 0.2–1)
BUN SERPL-MCNC: 23 MG/DL (ref 5–25)
CALCIUM SERPL-MCNC: 9.9 MG/DL (ref 8.4–10.2)
CHLORIDE SERPL-SCNC: 104 MMOL/L (ref 96–108)
CHOLEST SERPL-MCNC: 114 MG/DL
CO2 SERPL-SCNC: 29 MMOL/L (ref 21–32)
CREAT SERPL-MCNC: 0.89 MG/DL (ref 0.6–1.3)
EOSINOPHIL # BLD AUTO: 0.01 THOUSAND/ÂΜL (ref 0–0.61)
EOSINOPHIL NFR BLD AUTO: 0 % (ref 0–6)
ERYTHROCYTE [DISTWIDTH] IN BLOOD BY AUTOMATED COUNT: 15 % (ref 11.6–15.1)
EST. AVERAGE GLUCOSE BLD GHB EST-MCNC: 120 MG/DL
GFR SERPL CREATININE-BSD FRML MDRD: 61 ML/MIN/1.73SQ M
GLUCOSE P FAST SERPL-MCNC: 88 MG/DL (ref 65–99)
HBA1C MFR BLD: 5.8 %
HCT VFR BLD AUTO: 41.6 % (ref 34.8–46.1)
HDLC SERPL-MCNC: 38 MG/DL
HGB BLD-MCNC: 13.4 G/DL (ref 11.5–15.4)
IMM GRANULOCYTES # BLD AUTO: 0.02 THOUSAND/UL (ref 0–0.2)
IMM GRANULOCYTES NFR BLD AUTO: 0 % (ref 0–2)
LDLC SERPL CALC-MCNC: 59 MG/DL (ref 0–100)
LYMPHOCYTES # BLD AUTO: 1.38 THOUSANDS/ÂΜL (ref 0.6–4.47)
LYMPHOCYTES NFR BLD AUTO: 22 % (ref 14–44)
MCH RBC QN AUTO: 31.5 PG (ref 26.8–34.3)
MCHC RBC AUTO-ENTMCNC: 32.2 G/DL (ref 31.4–37.4)
MCV RBC AUTO: 98 FL (ref 82–98)
MONOCYTES # BLD AUTO: 0.46 THOUSAND/ÂΜL (ref 0.17–1.22)
MONOCYTES NFR BLD AUTO: 7 % (ref 4–12)
NEUTROPHILS # BLD AUTO: 4.34 THOUSANDS/ÂΜL (ref 1.85–7.62)
NEUTS SEG NFR BLD AUTO: 70 % (ref 43–75)
NONHDLC SERPL-MCNC: 76 MG/DL
NRBC BLD AUTO-RTO: 0 /100 WBCS
PLATELET # BLD AUTO: 198 THOUSANDS/UL (ref 149–390)
PMV BLD AUTO: 11.5 FL (ref 8.9–12.7)
POTASSIUM SERPL-SCNC: 5.4 MMOL/L (ref 3.5–5.3)
PROT SERPL-MCNC: 7.7 G/DL (ref 6.4–8.4)
RBC # BLD AUTO: 4.26 MILLION/UL (ref 3.81–5.12)
SODIUM SERPL-SCNC: 139 MMOL/L (ref 135–147)
TRIGL SERPL-MCNC: 83 MG/DL
TSH SERPL DL<=0.05 MIU/L-ACNC: 2.19 UIU/ML (ref 0.45–4.5)
WBC # BLD AUTO: 6.24 THOUSAND/UL (ref 4.31–10.16)

## 2023-08-22 PROCEDURE — 36415 COLL VENOUS BLD VENIPUNCTURE: CPT

## 2023-08-22 PROCEDURE — 99213 OFFICE O/P EST LOW 20 MIN: CPT | Performed by: NURSE PRACTITIONER

## 2023-08-22 PROCEDURE — 80061 LIPID PANEL: CPT

## 2023-08-22 PROCEDURE — 85025 COMPLETE CBC W/AUTO DIFF WBC: CPT

## 2023-08-22 PROCEDURE — 83036 HEMOGLOBIN GLYCOSYLATED A1C: CPT

## 2023-08-22 PROCEDURE — 80053 COMPREHEN METABOLIC PANEL: CPT

## 2023-08-22 PROCEDURE — 84443 ASSAY THYROID STIM HORMONE: CPT

## 2023-08-22 NOTE — PROGRESS NOTES
Name: Neelam Johnson      : 1940      MRN: [id number]  Encounter Provider: RICKY Cox  Encounter Date: 2023   Encounter department: [address] [facility]     1. Traumatic hematoma of left lower leg, initial encounter    2. Chronic atrial fibrillation (HCC)    3. Chronic anticoagulation    Apply warm compresses 20 minutes 5 times daily. Advised to call the office for any worsening pain, expansion, or erythema. She has a follow up appointment scheduled next week. If not improving, she may need a surgical consultation. Anna Kinney is here today with a lump on her left lower leg. She closed the car door on her left leg yesterday. She had minimal pain. She then noticed a large lump on her outer calf. The area is warm to touch, no open wound or pain. She has been applying ice. She is on xarelto. Review of Systems   Constitutional: Negative for activity change and appetite change. Cardiovascular: Negative for leg swelling. Skin: Positive for color change. Neurological: Negative for numbness.        Current Outpatient Medications on File Prior to Visit   Medication Sig   • acetaminophen (TYLENOL) 325 mg tablet Take 3 tablets (975 mg total) by mouth every 8 (eight) hours (Patient taking differently: Take 975 mg by mouth every 8 (eight) hours As Needed)   • Calcium Acetate, Phos Binder, (CALCIUM ACETATE PO) Take by mouth daily     • clobetasol (TEMOVATE) 0.05 % ointment Apply once weekly to the affected area   • famotidine (PEPCID) 40 MG tablet TAKE 1 TABLET BY MOUTH EVERY DAY   • fluticasone (FLONASE) 50 mcg/act nasal spray SPRAY 1 SPRAY INTO EACH NOSTRIL EVERY DAY   • gabapentin (NEURONTIN) 800 mg tablet TAKE 1 TABLET BY MOUTH FOUR TIMES A DAY   • loratadine (CLARITIN) 10 mg tablet Take 10 mg by mouth daily   • magnesium 30 MG tablet Take 30 mg by mouth in the morning   • metoprolol tartrate (LOPRESSOR) 50 mg tablet TAKE 1. 5 TABLETS BY MOUTH 2 TIMES A DAY. • multivitamin (THERAGRAN) TABS Take 1 tablet by mouth daily   • pramipexole (MIRAPEX) 0.5 mg tablet TAKE 2 FULL TABLETS TWICE A DAY AT 5:00 P. M. AND 10:00 P. M.   • rivaroxaban (Xarelto) 20 mg tablet Take 1 tablet (20 mg total) by mouth daily with breakfast   • torsemide (DEMADEX) 20 mg tablet Take 1 tablet (20 mg total) by mouth daily       Objective     /80   Pulse 96   Temp (!) 97.2 °F (36.2 °C)   Ht 5' 5" (1.651 m)   Wt 103 kg (228 lb)   SpO2 97%   BMI 37.94 kg/m²     Physical Exam  Vitals reviewed. Constitutional:       Appearance: Normal appearance. Cardiovascular:      Pulses: Normal pulses. Skin:         Neurological:      Mental Status: She is alert.        [address] [place] [address] Dr Candice Murillo

## 2023-08-23 ENCOUNTER — RA CDI HCC (OUTPATIENT)
Dept: OTHER | Facility: HOSPITAL | Age: 83
End: 2023-08-23

## 2023-08-23 NOTE — PROGRESS NOTES
I13.0, e11.22  [address] [facility] coding opportunities          Chart Reviewed number of suggestions sent to Provider: 2     Patients Insurance     Medicare Insurance: Medicare

## 2023-08-24 ENCOUNTER — HOSPITAL ENCOUNTER (OUTPATIENT)
Dept: RADIOLOGY | Facility: HOSPITAL | Age: 83
Discharge: HOME | End: 2023-08-24
Payer: MEDICARE

## 2023-08-24 ENCOUNTER — HOSPITAL ENCOUNTER (OUTPATIENT)
Dept: NON INVASIVE DIAGNOSTICS | Facility: HOSPITAL | Age: 83
Discharge: HOME | End: 2023-08-24
Payer: MEDICARE

## 2023-08-24 DIAGNOSIS — I35.0 NONRHEUMATIC AORTIC VALVE STENOSIS: ICD-10-CM

## 2023-08-24 DIAGNOSIS — Z13.6 ENCOUNTER FOR SCREENING FOR STENOSIS OF CAROTID ARTERY: ICD-10-CM

## 2023-08-24 PROCEDURE — 74174 CTA ABD&PLVS W/CONTRAST: CPT

## 2023-08-24 PROCEDURE — 75572 CT HRT W/3D IMAGE: CPT

## 2023-08-24 PROCEDURE — 93880 EXTRACRANIAL BILAT STUDY: CPT

## 2023-08-24 PROCEDURE — G1004 CDSM NDSC: HCPCS

## 2023-08-24 PROCEDURE — 93880 EXTRACRANIAL BILAT STUDY: CPT | Performed by: SURGERY

## 2023-08-24 RX ORDER — IODIXANOL 320 MG/ML
120 INJECTION, SOLUTION INTRAVASCULAR
Status: COMPLETED | OUTPATIENT
Start: 2023-08-24 | End: 2023-08-24

## 2023-08-24 RX ADMIN — IODIXANOL 120 ML: 320 INJECTION, SOLUTION INTRAVASCULAR at 11:42

## 2023-08-25 ENCOUNTER — TELEPHONE (OUTPATIENT)
Dept: INTERNAL MEDICINE CLINIC | Facility: CLINIC | Age: 83
End: 2023-08-25

## 2023-08-25 NOTE — TELEPHONE ENCOUNTER
Received Voice Mail:    . I'm calling about I on was then on Tuesday with a hematoma and I think it looks fine and it doesn't pain me. Still has the same lump but it's soft. But my concern is last night I had a fever of 100 which I'm normally a 97.4 and the night before I had a 98.7 which I didn't think anything of it really. But then last night when it was 100, I don't know if I have reason for concern or if there's something I should be taken for it. I don't know if it comes from the hematoma, so I'm just at, I just don't know.

## 2023-08-25 NOTE — TELEPHONE ENCOUNTER
No new symptoms? Cough, GI symptoms? Urinary symptoms? Is the hematoma more red surrounding the area? If none of the above. Recommend tylenol. If fevers persist over the weekend, recommend urgent care or ER.

## 2023-08-26 ENCOUNTER — HOSPITAL ENCOUNTER (EMERGENCY)
Facility: HOSPITAL | Age: 83
Discharge: HOME | End: 2023-08-26
Attending: EMERGENCY MEDICINE | Admitting: EMERGENCY MEDICINE
Payer: MEDICARE

## 2023-08-26 VITALS
RESPIRATION RATE: 20 BRPM | BODY MASS INDEX: 37.93 KG/M2 | HEART RATE: 85 BPM | DIASTOLIC BLOOD PRESSURE: 68 MMHG | TEMPERATURE: 98.2 F | WEIGHT: 227.96 LBS | SYSTOLIC BLOOD PRESSURE: 123 MMHG | OXYGEN SATURATION: 95 %

## 2023-08-26 DIAGNOSIS — T14.8XXA INFECTED HEMATOMA: Primary | ICD-10-CM

## 2023-08-26 DIAGNOSIS — L03.116 LEFT LEG CELLULITIS: ICD-10-CM

## 2023-08-26 DIAGNOSIS — L08.9 INFECTED HEMATOMA: Primary | ICD-10-CM

## 2023-08-26 PROCEDURE — 99284 EMERGENCY DEPT VISIT MOD MDM: CPT

## 2023-08-26 PROCEDURE — 99284 EMERGENCY DEPT VISIT MOD MDM: CPT | Performed by: EMERGENCY MEDICINE

## 2023-08-26 PROCEDURE — 10160 PNXR ASPIR ABSC HMTMA BULLA: CPT | Performed by: EMERGENCY MEDICINE

## 2023-08-26 RX ORDER — CLINDAMYCIN HYDROCHLORIDE 300 MG/1
300 CAPSULE ORAL 3 TIMES DAILY
Qty: 21 CAPSULE | Refills: 0 | Status: SHIPPED | OUTPATIENT
Start: 2023-08-26 | End: 2023-09-02

## 2023-08-26 RX ORDER — CLINDAMYCIN HYDROCHLORIDE 150 MG/1
300 CAPSULE ORAL ONCE
Status: COMPLETED | OUTPATIENT
Start: 2023-08-26 | End: 2023-08-26

## 2023-08-26 RX ADMIN — CLINDAMYCIN HYDROCHLORIDE 300 MG: 150 CAPSULE ORAL at 10:36

## 2023-08-26 NOTE — ED PROVIDER NOTES
History  Chief Complaint   Patient presents with   • Blister     Patient presents to the ED for a blister and hematoma on the L lower leg. States the corner of her car door hit her leg. Patient reports xarelto use. 83 yo F coming into the ED for eval of left leg swelling, warmth and redness, low grade fevers/chills (T max 100F). She bumped her left lower leg on a car door on Monday, 6 days ago, and has had bruising and swelling ever since. She does state it seems to be slowly worsening despite using warm compresses. History provided by:  Patient   used: No        Prior to Admission Medications   Prescriptions Last Dose Informant Patient Reported? Taking? Calcium Acetate, Phos Binder, (CALCIUM ACETATE PO)  Self Yes No   Sig: Take by mouth daily     acetaminophen (TYLENOL) 325 mg tablet  Self No No   Sig: Take 3 tablets (975 mg total) by mouth every 8 (eight) hours   Patient taking differently: Take 975 mg by mouth every 8 (eight) hours As Needed   clobetasol (TEMOVATE) 0.05 % ointment  Self No No   Sig: Apply once weekly to the affected area   famotidine (PEPCID) 40 MG tablet  Self No No   Sig: TAKE 1 TABLET BY MOUTH EVERY DAY   fluticasone (FLONASE) 50 mcg/act nasal spray  Self No No   Sig: SPRAY 1 SPRAY INTO EACH NOSTRIL EVERY DAY   gabapentin (NEURONTIN) 800 mg tablet  Self No No   Sig: TAKE 1 TABLET BY MOUTH FOUR TIMES A DAY   loratadine (CLARITIN) 10 mg tablet  Self Yes No   Sig: Take 10 mg by mouth daily   magnesium 30 MG tablet  Self Yes No   Sig: Take 30 mg by mouth in the morning   metoprolol tartrate (LOPRESSOR) 50 mg tablet  Self No No   Sig: TAKE 1.5 TABLETS BY MOUTH 2 TIMES A DAY. multivitamin (THERAGRAN) TABS  Self Yes No   Sig: Take 1 tablet by mouth daily   pramipexole (MIRAPEX) 0.5 mg tablet  Self No No   Sig: TAKE 2 FULL TABLETS TWICE A DAY AT 5:00 P. M. AND 10:00 P. M.   rivaroxaban (Xarelto) 20 mg tablet  Self No No   Sig: Take 1 tablet (20 mg total) by mouth daily with breakfast   torsemide (DEMADEX) 20 mg tablet  Self No No   Sig: Take 1 tablet (20 mg total) by mouth daily      Facility-Administered Medications: None       Past Medical History:   Diagnosis Date   • Arthritis    • Atrial fibrillation ([address])    • Carrero's esophagus     last assessed: 1/23/2018   • BRCA1 negative    • BRCA2 negative    • Breast cancer ([address]) 2006    stage 1 (left), given adjuvant radiation with Arimidex x 5 years   • Cancer ([address]) 2006    Left Breast, Lumpectomy   • Cataract     last assessed: 3/11/2014   • Chronic diastolic CHF (congestive heart failure) ([address]) 11/21/2022   • Dysplasia of toenail     last assessed: 8/29/2017   • Esophageal reflux    • GERD (gastroesophageal reflux disease)    • Gross hematuria     last assessed: 2/19/2015   • Hematuria    • Hiatal hernia    • History of radiation therapy    • Hypertension    • Irregular heart beat     AFIB   • Mixed sensory-motor polyneuropathy    • Neuropathy    • Obesity    • Pacemaker    • Paroxysmal atrial fibrillation ([address])    • Peripheral neuropathy    • Rectal bleeding    • Restless leg syndrome    • Shortness of breath     last assessed: 1/11/2016       Past Surgical History:   Procedure Laterality Date   • BREAST BIOPSY Left 2006   • BREAST LUMPECTOMY Left 2006    onset: 2006   • BREAST SURGERY     • CARDIAC PACEMAKER PLACEMENT      x 3 2006   • CATARACT EXTRACTION     • COLONOSCOPY     • CYSTOSCOPY  04/04/2014    diagnostic   • HYSTERECTOMY      ALISON BSO; due to fibroid uterus; age 38   • KNEE CARTILAGE SURGERY      excision lesion of meniscus or capsule knee   • KNEE SURGERY     • OOPHORECTOMY Bilateral     age 38   • OTHER SURGICAL HISTORY      radiation therapy   • AR ARTHRP KNE CONDYLE&PLATU MEDIAL&LAT COMPARTMENTS Left 08/17/2020    Procedure: ARTHROPLASTY KNEE TOTAL;  Surgeon: Britni Sauer DO;  Location: [facility];  Service: Orthopedics   • AR ARTHRP KNE CONDYLE&PLATU MEDIAL&LAT COMPARTMENTS Right 03/28/2022    Procedure: ARTHROPLASTY KNEE TOTAL W ROBOT - RIGHT;  Surgeon: Cheryl Lopez DO;  [facility]  Service: Orthopedics   • [place] COLONOSCOPY FLX DX W/COLLJ SPEC WHEN PFRMD N/A 2017    Procedure: COLONOSCOPY;  Surgeon: Sunny Acuna MD;  Location:  GI LAB; Service: Gastroenterology   • [place] ESOPHAGOGASTRODUODENOSCOPY TRANSORAL DIAGNOSTIC N/A 2017    Procedure: ESOPHAGOGASTRODUODENOSCOPY (EGD); Surgeon: Ericka Head MD;  Location:  GI LAB; Service: Gastroenterology   • UPPER GASTROINTESTINAL ENDOSCOPY         Family History   Problem Relation Age of Onset   • Hypertension Mother    • Heart disease Father    • Aneurysm Father    • Coronary artery disease Father         in his 76s with aneurysm   • Aortic aneurysm Father         abdominal   • Scleroderma Sister    • Breast cancer Sister 76   • Hypertension Sister    • Cancer Sister    • No Known Problems Son    • No Known Problems Son    • Testicular cancer Son 39   • Thyroid cancer Son 45   • Colon cancer Maternal Aunt    • Colon cancer Maternal Aunt    • Breast cancer Other 48        kaylee's daughter   • Alcohol abuse Neg Hx    • Substance Abuse Neg Hx    • Mental illness Neg Hx    • Depression Neg Hx      I have reviewed and agree with the history as documented. E-Cigarette/Vaping   • E-Cigarette Use Never User      E-Cigarette/Vaping Substances   • Nicotine No    • THC No    • CBD No    • Flavoring No    • Other No    • Unknown No      Social History     Tobacco Use   • Smoking status: Former     Packs/day: 1.00     Years: 25.00     Total pack years: 25.00     Types: Cigarettes     Quit date: 1980     Years since quittin.9   • Smokeless tobacco: Never   • Tobacco comments:     Quit over 30 years ago; quit age 41   Vaping Use   • Vaping Use: Never used   Substance Use Topics   • Alcohol use: Not Currently   • Drug use: No       Review of Systems   Skin:        Left lower leg swelling, bruising.    All other systems reviewed and are negative. Physical Exam  Physical Exam  Vitals and nursing note reviewed. Constitutional:       General: She is not in acute distress. Appearance: Normal appearance. She is well-developed and normal weight. She is not ill-appearing, toxic-appearing or diaphoretic. HENT:      Head: Normocephalic and atraumatic. Right Ear: External ear normal.      Left Ear: External ear normal.      Nose: Nose normal.      Mouth/Throat:      Mouth: Mucous membranes are moist.      Pharynx: Oropharynx is clear. Eyes:      Conjunctiva/sclera: Conjunctivae normal.   Cardiovascular:      Rate and Rhythm: Normal rate and regular rhythm. Pulses: Normal pulses. Heart sounds: Normal heart sounds. Pulmonary:      Effort: Pulmonary effort is normal.      Breath sounds: Normal breath sounds. Abdominal:      General: Abdomen is flat. Bowel sounds are normal. There is no distension or abdominal bruit. There are no signs of injury. Palpations: Abdomen is soft. There is no shifting dullness. Tenderness: There is no abdominal tenderness. Genitourinary:     Adnexa: Right adnexa normal and left adnexa normal.   Musculoskeletal:         General: Normal range of motion. Cervical back: Normal range of motion and neck supple. Comments: Left lower lateral leg - hematoma/blister present with surrounding warmth, mild redness. Skin:     General: Skin is warm and dry. Capillary Refill: Capillary refill takes less than 2 seconds. Neurological:      General: No focal deficit present. Mental Status: She is alert and oriented to person, place, and time. Mental status is at baseline.    Psychiatric:         Mood and Affect: Mood normal.         Behavior: Behavior normal.         Vital Signs  ED Triage Vitals [08/26/23 0912]   Temperature Pulse Respirations Blood Pressure SpO2   98.2 °F (36.8 °C) 85 20 123/68 95 %      Temp Source Heart Rate Source Patient Position - Orthostatic VS BP Location FiO2 (%)   Oral Monitor Sitting Right arm --      Pain Score       --           Vitals:    08/26/23 0912   BP: 123/68   Pulse: 85   Patient Position - Orthostatic VS: Sitting         Visual Acuity      ED Medications  Medications   clindamycin (CLEOCIN) capsule 300 mg (300 mg Oral Given 8/26/23 1036)       Diagnostic Studies  Results Reviewed     None                 No orders to display              Procedures  Incision and drain    Date/Time: 8/26/2023 10:30 AM    Performed by: Elizabeth Zhang DO  Authorized by: Elizabeth Zhang DO  Universal Protocol:  Consent: Verbal consent obtained. Risks and benefits: risks, benefits and alternatives were discussed  Consent given by: patient  Time out: Immediately prior to procedure a "time out" was called to verify the correct patient, procedure, equipment, support staff and site/side marked as required. Patient understanding: patient states understanding of the procedure being performed  Required items: required blood products, implants, devices, and special equipment available  Patient identity confirmed: verbally with patient      Patient location:  ED  Location:     Type:  Hematoma    Size:  3 x 3 cm    Location:  Lower extremity    Lower extremity location:  L leg  Pre-procedure details:     Skin preparation:  Chloraprep  Anesthesia (see MAR for exact dosages): Anesthesia method:  None  Procedure details:     Complexity:  Simple    Needle aspiration: yes      Needle size:  18 G    Aspiration type: puncture aspiration      Approach:  Puncture    Incision depth:  Subcutaneous    Drainage:  Serosanguinous    Drainage amount:  Scant    Wound treatment:  Wound left open    Packing materials:  None  Post-procedure details:     Patient tolerance of procedure: Tolerated well, no immediate complications             ED Course                               SBIRT 20yo+    Flowsheet Row Most Recent Value   Initial Alcohol Screen: US AUDIT-C     1.  How often do you have a drink containing alcohol? 0 Filed at: 08/26/2023 0914   2. How many drinks containing alcohol do you have on a typical day you are drinking? 0 Filed at: 08/26/2023 0914   3a. Male UNDER 65: How often do you have five or more drinks on one occasion? 0 Filed at: 08/26/2023 0914   3b. FEMALE Any Age, or MALE 65+: How often do you have 4 or more drinks on one occassion? 0 Filed at: 08/26/2023 0914   Audit-C Score 0 Filed at: 08/26/2023 1343   KIM: How many times in the past year have you. .. Used an illegal drug or used a prescription medication for non-medical reasons? Never Filed at: 08/26/2023 7822                    Medical Decision Making  81 yo F w/ left leg swelling, hematoma, redness after bumping it 6 days ago. On exam, concern for developing cellulitis, infected hematoma. Pt agreeable for 18 g puncture aspiration - this was performed with a small amount of serosanguinous fluid expressed. Leg was wrapped. Start on antibiotics, clindamycin due to multiple abx allergies. Pt agreeable w/ plan. Stable for d/c with outpt treatment, RTER precautions discussed. Infected hematoma: acute illness or injury  Left leg cellulitis: acute illness or injury  Risk  Prescription drug management. Disposition  Final diagnoses:   Infected hematoma   Left leg cellulitis     Time reflects when diagnosis was documented in both MDM as applicable and the Disposition within this note     Time User Action Codes Description Comment    8/26/2023 10:30 AM Immanuel Mcclure Add Zev Vega. 8XXA,  L08.9] Infected hematoma     8/26/2023 10:30 AM Nereida Nieot [C24.117] Left leg cellulitis       ED Disposition     ED Disposition   Discharge    Condition   Stable    Date/Time   Sat Aug 26, 2023 10:30 AM    Comment   Kay Ortiz discharge to home/self care.                Follow-up Information     Follow up With Specialties Details Why [address], MD Internal Medicine   [facility] [address]  [phone]            Discharge Medication List as of 8/26/2023 10:31 AM      START taking these medications    Details   clindamycin (CLEOCIN) 300 MG capsule Take 1 capsule (300 mg total) by mouth 3 (three) times a day for 7 days, Starting Sat 8/26/2023, Until Sat 9/2/2023, Normal         CONTINUE these medications which have NOT CHANGED    Details   acetaminophen (TYLENOL) 325 mg tablet Take 3 tablets (975 mg total) by mouth every 8 (eight) hours, Starting Tue 3/29/2022, No Print      Calcium Acetate, Phos Binder, (CALCIUM ACETATE PO) Take by mouth daily  , Historical Med      clobetasol (TEMOVATE) 0.05 % ointment Apply once weekly to the affected area, Normal      famotidine (PEPCID) 40 MG tablet TAKE 1 TABLET BY MOUTH EVERY DAY, Normal      fluticasone (FLONASE) 50 mcg/act nasal spray SPRAY 1 SPRAY INTO EACH NOSTRIL EVERY DAY, Normal      gabapentin (NEURONTIN) 800 mg tablet TAKE 1 TABLET BY MOUTH FOUR TIMES A DAY, Normal      loratadine (CLARITIN) 10 mg tablet Take 10 mg by mouth daily, Historical Med      magnesium 30 MG tablet Take 30 mg by mouth in the morning, Historical Med      metoprolol tartrate (LOPRESSOR) 50 mg tablet TAKE 1.5 TABLETS BY MOUTH 2 TIMES A DAY., Normal      multivitamin (THERAGRAN) TABS Take 1 tablet by mouth daily, Historical Med      pramipexole (MIRAPEX) 0.5 mg tablet TAKE 2 FULL TABLETS TWICE A DAY AT 5:00 P. M. AND 10:00 P.M., Normal      rivaroxaban (Xarelto) 20 mg tablet Take 1 tablet (20 mg total) by mouth daily with breakfast, Starting Fri 7/28/2023, Normal      torsemide (DEMADEX) 20 mg tablet Take 1 tablet (20 mg total) by mouth daily, Starting Wed 11/23/2022, Normal             No discharge procedures on file.     PDMP Review       Value Time User    PDMP Reviewed  Yes 4/22/2022 10:26 AM Zahra Perez PA-C          ED Provider  Electronically Signed by           Willard Kayser, DO  08/26/23 7679

## 2023-08-27 ENCOUNTER — HOSPITAL ENCOUNTER (EMERGENCY)
Facility: HOSPITAL | Age: 83
Discharge: HOME | End: 2023-08-27
Attending: EMERGENCY MEDICINE
Payer: MEDICARE

## 2023-08-27 VITALS
SYSTOLIC BLOOD PRESSURE: 132 MMHG | TEMPERATURE: 97.5 F | RESPIRATION RATE: 18 BRPM | OXYGEN SATURATION: 97 % | DIASTOLIC BLOOD PRESSURE: 75 MMHG | HEART RATE: 93 BPM

## 2023-08-27 DIAGNOSIS — T14.8XXA HEMATOMA: Primary | ICD-10-CM

## 2023-08-27 PROCEDURE — 87205 SMEAR GRAM STAIN: CPT | Performed by: PHYSICIAN ASSISTANT

## 2023-08-27 PROCEDURE — 10140 I&D HMTMA SEROMA/FLUID COLLJ: CPT | Performed by: PHYSICIAN ASSISTANT

## 2023-08-27 PROCEDURE — 90715 TDAP VACCINE 7 YRS/> IM: CPT | Performed by: PHYSICIAN ASSISTANT

## 2023-08-27 PROCEDURE — 90471 IMMUNIZATION ADMIN: CPT

## 2023-08-27 PROCEDURE — 87070 CULTURE OTHR SPECIMN AEROBIC: CPT | Performed by: PHYSICIAN ASSISTANT

## 2023-08-27 PROCEDURE — 99284 EMERGENCY DEPT VISIT MOD MDM: CPT | Performed by: PHYSICIAN ASSISTANT

## 2023-08-27 PROCEDURE — 87147 CULTURE TYPE IMMUNOLOGIC: CPT | Performed by: PHYSICIAN ASSISTANT

## 2023-08-27 RX ORDER — LIDOCAINE HYDROCHLORIDE 10 MG/ML
5 INJECTION, SOLUTION EPIDURAL; INFILTRATION; INTRACAUDAL; PERINEURAL ONCE
Status: COMPLETED | OUTPATIENT
Start: 2023-08-27 | End: 2023-08-27

## 2023-08-27 RX ADMIN — LIDOCAINE HYDROCHLORIDE 5 ML: 10 INJECTION, SOLUTION EPIDURAL; INFILTRATION; INTRACAUDAL; PERINEURAL at 11:53

## 2023-08-27 RX ADMIN — TETANUS TOXOID, REDUCED DIPHTHERIA TOXOID AND ACELLULAR PERTUSSIS VACCINE, ADSORBED 0.5 ML: 5; 2.5; 8; 8; 2.5 SUSPENSION INTRAMUSCULAR at 11:55

## 2023-08-27 NOTE — ED PROVIDER NOTES
History  Chief Complaint   Patient presents with   • Wound Check     Pt reports had left lower leg wound drained yesterday and states it filled back up with fluid and painful       History provided by:  Patient  Wound Check   She was treated in the ED yesterday. Prior ED Treatment: Hematoma aspiration and drainage. Treatments since wound repair include oral antibiotics. The fever has been present for less than 1 day. Her temperature was unmeasured prior to arrival. There has been no drainage from the wound. There is no redness present. The swelling has worsened. The pain has not changed. She is unable to move the affected extremity or digit. Prior to Admission Medications   Prescriptions Last Dose Informant Patient Reported? Taking? Calcium Acetate, Phos Binder, (CALCIUM ACETATE PO)  Self Yes No   Sig: Take by mouth daily     acetaminophen (TYLENOL) 325 mg tablet  Self No No   Sig: Take 3 tablets (975 mg total) by mouth every 8 (eight) hours   Patient taking differently: Take 975 mg by mouth every 8 (eight) hours As Needed   clindamycin (CLEOCIN) 300 MG capsule   No No   Sig: Take 1 capsule (300 mg total) by mouth 3 (three) times a day for 7 days   clobetasol (TEMOVATE) 0.05 % ointment  Self No No   Sig: Apply once weekly to the affected area   famotidine (PEPCID) 40 MG tablet  Self No No   Sig: TAKE 1 TABLET BY MOUTH EVERY DAY   fluticasone (FLONASE) 50 mcg/act nasal spray  Self No No   Sig: SPRAY 1 SPRAY INTO EACH NOSTRIL EVERY DAY   gabapentin (NEURONTIN) 800 mg tablet  Self No No   Sig: TAKE 1 TABLET BY MOUTH FOUR TIMES A DAY   loratadine (CLARITIN) 10 mg tablet  Self Yes No   Sig: Take 10 mg by mouth daily   magnesium 30 MG tablet  Self Yes No   Sig: Take 30 mg by mouth in the morning   metoprolol tartrate (LOPRESSOR) 50 mg tablet  Self No No   Sig: TAKE 1.5 TABLETS BY MOUTH 2 TIMES A DAY.    multivitamin (THERAGRAN) TABS  Self Yes No   Sig: Take 1 tablet by mouth daily   pramipexole (MIRAPEX) 0.5 mg tablet  Self No No   Sig: TAKE 2 FULL TABLETS TWICE A DAY AT 5:00 P. M. AND 10:00 P. M.   rivaroxaban (Xarelto) 20 mg tablet  Self No No   Sig: Take 1 tablet (20 mg total) by mouth daily with breakfast   torsemide (DEMADEX) 20 mg tablet  Self No No   Sig: Take 1 tablet (20 mg total) by mouth daily      Facility-Administered Medications: None       Past Medical History:   Diagnosis Date   • Arthritis    • Atrial fibrillation ([address])    • Carrero's esophagus     last assessed: 1/23/2018   • BRCA1 negative    • BRCA2 negative    • Breast cancer ([address]) 2006    stage 1 (left), given adjuvant radiation with Arimidex x 5 years   • Cancer ([address]) 2006    Left Breast, Lumpectomy   • Cataract     last assessed: 3/11/2014   • Chronic diastolic CHF (congestive heart failure) ([address]) 11/21/2022   • Dysplasia of toenail     last assessed: 8/29/2017   • Esophageal reflux    • GERD (gastroesophageal reflux disease)    • Gross hematuria     last assessed: 2/19/2015   • Hematuria    • Hiatal hernia    • History of radiation therapy    • Hypertension    • Irregular heart beat     AFIB   • Mixed sensory-motor polyneuropathy    • Neuropathy    • Obesity    • Pacemaker    • Paroxysmal atrial fibrillation ([address])    • Peripheral neuropathy    • Rectal bleeding    • Restless leg syndrome    • Shortness of breath     last assessed: 1/11/2016       Past Surgical History:   Procedure Laterality Date   • BREAST BIOPSY Left 2006   • BREAST LUMPECTOMY Left 2006    onset: 2006   • BREAST SURGERY     • CARDIAC PACEMAKER PLACEMENT      x 3 2006   • CATARACT EXTRACTION     • COLONOSCOPY     • CYSTOSCOPY  04/04/2014    diagnostic   • HYSTERECTOMY      ALISON BSO; due to fibroid uterus; age 38   • KNEE CARTILAGE SURGERY      excision lesion of meniscus or capsule knee   • KNEE SURGERY     • OOPHORECTOMY Bilateral     age 38   • OTHER SURGICAL HISTORY      radiation therapy   • DC ARTHRP KNE CONDYLE&PLATU MEDIAL&LAT COMPARTMENTS Left 08/17/2020    Procedure: ARTHROPLASTY KNEE TOTAL;  Surgeon: Tammy Zapata DO;  Location: [facility];  Service: Orthopedics   • [place] ARTHRP KNE CONDYLE&PLATU MEDIAL&LAT COMPARTMENTS Right 2022    Procedure: ARTHROPLASTY KNEE TOTAL W ROBOT - RIGHT;  Surgeon: Tammy Zapata DO;  Location: [facility];  Service: Orthopedics   • NJ COLONOSCOPY FLX DX W/COLLJ SPEC WHEN PFRMD N/A 2017    Procedure: COLONOSCOPY;  Surgeon: Philip Parker MD;  Location:  GI LAB; Service: Gastroenterology   • NJ ESOPHAGOGASTRODUODENOSCOPY TRANSORAL DIAGNOSTIC N/A 2017    Procedure: ESOPHAGOGASTRODUODENOSCOPY (EGD); Surgeon: Yair Otoole MD;  Location: [facility]; Service: Gastroenterology   • UPPER GASTROINTESTINAL ENDOSCOPY         Family History   Problem Relation Age of Onset   • Hypertension Mother    • Heart disease Father    • Aneurysm Father    • Coronary artery disease Father         in his 76s with aneurysm   • Aortic aneurysm Father         abdominal   • Scleroderma Sister    • Breast cancer Sister 76   • Hypertension Sister    • Cancer Sister    • No Known Problems Son    • No Known Problems Son    • Testicular cancer Son 39   • Thyroid cancer Son 45   • Colon cancer Maternal Aunt    • Colon cancer Maternal Aunt    • Breast cancer Other 48        kaylee's daughter   • Alcohol abuse Neg Hx    • Substance Abuse Neg Hx    • Mental illness Neg Hx    • Depression Neg Hx      I have reviewed and agree with the history as documented.     E-Cigarette/Vaping   • E-Cigarette Use Never User      E-Cigarette/Vaping Substances   • Nicotine No    • THC No    • CBD No    • Flavoring No    • Other No    • Unknown No      Social History     Tobacco Use   • Smoking status: Former     Packs/day: 1.00     Years: 25.00     Total pack years: 25.00     Types: Cigarettes     Quit date: 1980     Years since quittin.9   • Smokeless tobacco: Never   • Tobacco comments:     Quit over 30 years ago; quit age 41   Vaping Use   • Vaping Use: Never used Substance Use Topics   • Alcohol use: Not Currently   • Drug use: No       Review of Systems   Constitutional: Negative for activity change, chills, fatigue and fever. Musculoskeletal: Negative for gait problem. Skin: Positive for color change and wound. Psychiatric/Behavioral: Negative for confusion. All other systems reviewed and are negative. Physical Exam  Physical Exam  Vitals and nursing note reviewed. Constitutional:       General: She is not in acute distress. Appearance: Normal appearance. She is obese. She is not ill-appearing, toxic-appearing or diaphoretic. HENT:      Right Ear: External ear normal.   Eyes:      Conjunctiva/sclera: Conjunctivae normal.   Pulmonary:      Effort: Pulmonary effort is normal.   Skin:     Capillary Refill: Capillary refill takes less than 2 seconds. Comments: Inspection of the left lower extremity-there is a 5 cm x 5 cm hematoma present over the lateral aspect of the leg. There is a scant bloody drainage from it. There is marked venous stasis disease and a vasculitis present. The area is not warm upon palpation. There does not appear to be a secondary cellulitis. Area is tender upon palpation. There are palpable pulses distal that are +2 and symmetrical over the dorsalis pedis and posterior tibial arteries. Patient has sensation to the deep peroneal, superficial peroneal, sural, posterior tibial distribution of the left lower extremity. Capillary refills less than 2 seconds. Neurological:      General: No focal deficit present. Mental Status: She is alert and oriented to person, place, and time. Psychiatric:         Mood and Affect: Mood normal.         Behavior: Behavior normal.         Thought Content:  Thought content normal.         Judgment: Judgment normal.         Vital Signs  ED Triage Vitals [08/27/23 1113]   Temperature Pulse Respirations Blood Pressure SpO2   97.5 °F (36.4 °C) 93 18 132/75 97 %      Temp Source Heart Rate Source Patient Position - Orthostatic VS BP Location FiO2 (%)   Oral Monitor Sitting Right arm --      Pain Score       No Pain           Vitals:    08/27/23 1113   BP: 132/75   Pulse: 93   Patient Position - Orthostatic VS: Sitting         Visual Acuity      ED Medications  Medications   lidocaine (PF) (XYLOCAINE-MPF) 1 % injection 5 mL (5 mL Infiltration Given by Other 8/27/23 1153)   tetanus-diphtheria-acellular pertussis (BOOSTRIX) IM injection 0.5 mL (0.5 mL Intramuscular Given 8/27/23 1155)       Diagnostic Studies  Results Reviewed     Procedure Component Value Units Date/Time    Wound culture and Gram stain [[id number]] Collected: 08/27/23 1231    Lab Status: In process Specimen: Wound from Leg, Left Updated: 08/27/23 1236                 No orders to display              Procedures  Incision and drain    Date/Time: 8/27/2023 12:00 PM    Performed by: Birgit Ahmadi PA-C  Authorized by: Birgit Ahmadi PA-C  Universal Protocol:  Procedure performed by:  Consent: Verbal consent obtained. Consent given by: patient  Time out: Immediately prior to procedure a "time out" was called to verify the correct patient, procedure, equipment, support staff and site/side marked as required. Timeout called at: 8/27/2023 12:21 PM.  Patient understanding: patient states understanding of the procedure being performed  Site marked: the operative site was marked    Patient location:  ED  Location:     Type:  Hematoma    Size:  6 x 6 cm    Location: left lower leg. Pre-procedure details:     Skin preparation:  Betadine  Anesthesia (see MAR for exact dosages):      Anesthesia method:  Local infiltration    Local anesthetic:  Lidocaine 1% w/o epi  Procedure details:     Complexity:  Simple    Needle aspiration: no      Incision types:  Stab incision    Scalpel blade:  10    Approach:  Open    Incision depth:  Subcutaneous    Wound management:  Probed and deloculated    Irrigation with saline:  Copiously    Drainage: Bloody    Drainage amount:  Scant    Wound treatment:  Wound left open and packing placed    Packing materials:  1/4 in gauze  Post-procedure details:     Patient tolerance of procedure: Tolerated well, no immediate complications  Comments:      Ace , telfa and 4x4 applied             ED Course                                             Medical Decision Making  This 82-year-old returns to the emergency room for recheck of her hematoma. She was struck by a car door, 1 week ago. She had a aspiration with decompression yesterday. She was placed on clindamycin. She complains of increased swelling over her lateral left lower leg. She denies any fever or chills. She denies any shaking rigors. She denies any nausea, vomiting, diarrhea. She denies any chest pain or shortness of breath. She denies any abdominal pain. She denies any urinary frequency, urgency, hematuria dysuria. Her last tetanus immunization was in [address]    She has a past medical history is positive for aortic stenosis, atrial fibrillation on Xarelto, Carrero's esophagus, BRCA1 negative, BRCA2 negative, GERD, gross hematuria, hiatal hernia, history of radiation therapy, hypertension, mixed sensory neuropathy, breast cancer, cataracts, CHF, dysplasia, esophageal reflux, hypertension, hiatal hernia, obesity, pacemaker, rectal bleeding, restless leg syndrome    Physical exam this 82-year-old female is alert and oriented x3. She is in no acute distress. Her airway is patent. Upon inspection of her left lower extremity-there is a 5 cm x 5 cm hematoma present over the lateral aspect of the left lower extremity. There is secondary marked venous stasis disease as well as some vasculitis. The area is not warm. It is tender upon palpation. There are some mild fluctuance present.   She has palpable pulses over the dorsalis pedis and posterior tibial arteries that are +2 and symmetrical.  She has sensation over the deep and superficial peroneal, sural distribution, posterior tibial distribution of the left lower extremity. Differential diagnosis includes but is not limited to hematoma, abscess, cellulitis, vasculitis. Procedure-the area was prepped and draped. It was anesthetized with 5 mL of 1% lidocaine. This was done by sterile technique. The a 1 cm incision was placed. A scant amount of bloody drainage was expressed. 1/4 inch iodoform gauze was placed in the wound. 4 x 4's and an Ace bandage were applied. The patient tolerated the procedure well. There was no complications. Impression-  Hematoma left leg  Xarelto therapy for atrial fibrillation    Plan  Patient will be discharged home. She is to elevate her left leg and take it easy for the next 4 hours. In 2 hours, if she feels that the Ace bandage is too tight. She may loosen it. If the Ace bandage bleeds through, her  was instructed to reinforce the area with 4 x 4's and a another Ace bandage. He is not to take the original dressing down. The patient will return to the emergency room in 2 days to have the packing removed. Should she have any problems in the interim, increased pain, increased swelling, drainage, fever greater than 100.4 °F, she will return to the emergency room for repeat exam.  She will finish her clindamycin as instructed. I did instruct her that she may take a probiotic or eat yogurt with this medication if it causes GI distress. She is going to return in 2 days for the packing removal.  He was given a tetanus immune. The emergency room prior to discharge. Hematoma: acute illness or injury  Amount and/or Complexity of Data Reviewed  Labs: ordered. Details: Wound culture taken and pending      Risk  Prescription drug management.           Disposition  Final diagnoses:   Hematoma - left lower leg     Time reflects when diagnosis was documented in both MDM as applicable and the Disposition within this note     Time User Action Codes Description Comment    8/27/2023 12:23 PM DreDemetri Treadwell Carmen. 8XXA] Hematoma     8/27/2023 12:23 PM Rabia Arnoldo. 8XXA] Hematoma left lower leg      ED Disposition     ED Disposition   Discharge    Condition   Stable    Date/Time   Sun Aug 27, 2023 12:23 PM    Comment   Tammi Larisa Angel discharge to home/self care.                Follow-up Information     Follow up With Specialties Details Why Contact Info Additional Information    [facility] Emergency Department Emergency Medicine In 2 days For packing removal [address] [facility], [place], [place], [address]          Discharge Medication List as of 8/27/2023 12:25 PM      CONTINUE these medications which have NOT CHANGED    Details   acetaminophen (TYLENOL) 325 mg tablet Take 3 tablets (975 mg total) by mouth every 8 (eight) hours, Starting Tue 3/29/2022, No Print      Calcium Acetate, Phos Binder, (CALCIUM ACETATE PO) Take by mouth daily  , Historical Med      clindamycin (CLEOCIN) 300 MG capsule Take 1 capsule (300 mg total) by mouth 3 (three) times a day for 7 days, Starting Sat 8/26/2023, Until Sat 9/2/2023, Normal      clobetasol (TEMOVATE) 0.05 % ointment Apply once weekly to the affected area, Normal      famotidine (PEPCID) 40 MG tablet TAKE 1 TABLET BY MOUTH EVERY DAY, Normal      fluticasone (FLONASE) 50 mcg/act nasal spray SPRAY 1 SPRAY INTO EACH NOSTRIL EVERY DAY, Normal      gabapentin (NEURONTIN) 800 mg tablet TAKE 1 TABLET BY MOUTH FOUR TIMES A DAY, Normal      loratadine (CLARITIN) 10 mg tablet Take 10 mg by mouth daily, Historical Med      magnesium 30 MG tablet Take 30 mg by mouth in the morning, Historical Med      metoprolol tartrate (LOPRESSOR) 50 mg tablet TAKE 1.5 TABLETS BY MOUTH 2 TIMES A DAY., Normal      multivitamin (THERAGRAN) TABS Take 1 tablet by mouth daily, Historical Med pramipexole (MIRAPEX) 0.5 mg tablet TAKE 2 FULL TABLETS TWICE A DAY AT 5:00 P. M. AND 10:00 P.M., Normal      rivaroxaban (Xarelto) 20 mg tablet Take 1 tablet (20 mg total) by mouth daily with breakfast, Starting Fri 7/28/2023, Normal      torsemide (DEMADEX) 20 mg tablet Take 1 tablet (20 mg total) by mouth daily, Starting Wed 11/23/2022, Normal             No discharge procedures on file.     PDMP Review       Value Time User    PDMP Reviewed  Yes 4/22/2022 10:26 AM Wanda Alvarenga PA-C          ED Provider  Electronically Signed by           Chan Valencia PA-C  08/27/23 2041

## 2023-08-27 NOTE — DISCHARGE INSTRUCTIONS
Keep leg elevated    You may loosen the Ace bandage in 2 hours if you feel that it is tight. If you have swelling below the Ace bandage in 2 hours, loosen the Ace bandage. If the Ace bandage bleeds through, reinforced with 4 x 4's and another Ace bandage. Please lay the Ace flat do not pull it tight. Return to the emergency room in 2 weeks to have the packing removed. If you have any problems in the interim, increased redness, pain, bleeding, fever or chills, return to the emergency room sooner. Continue clindamycin as instructed. You may take a probiotic or eat yogurt with it if you have stomach side effects.

## 2023-08-28 ENCOUNTER — VBI (OUTPATIENT)
Dept: INTERNAL MEDICINE CLINIC | Facility: CLINIC | Age: 83
End: 2023-08-28

## 2023-08-28 NOTE — TELEPHONE ENCOUNTER
08/28/23 10:51 AM    Patient contacted post ED visit, [facility] department spoke with patient/caregiver and outreach was successful. Thank you.   NATI DUMONT  [facility]

## 2023-08-29 ENCOUNTER — HOSPITAL ENCOUNTER (EMERGENCY)
Facility: HOSPITAL | Age: 83
Discharge: HOME | End: 2023-08-29
Attending: EMERGENCY MEDICINE
Payer: MEDICARE

## 2023-08-29 VITALS
DIASTOLIC BLOOD PRESSURE: 86 MMHG | SYSTOLIC BLOOD PRESSURE: 132 MMHG | RESPIRATION RATE: 24 BRPM | HEART RATE: 103 BPM | OXYGEN SATURATION: 96 % | TEMPERATURE: 97.5 F

## 2023-08-29 DIAGNOSIS — S80.12XA LEG HEMATOMA, LEFT, INITIAL ENCOUNTER: ICD-10-CM

## 2023-08-29 DIAGNOSIS — Z51.89 VISIT FOR WOUND CHECK: Primary | ICD-10-CM

## 2023-08-29 PROCEDURE — 99024 POSTOP FOLLOW-UP VISIT: CPT | Performed by: EMERGENCY MEDICINE

## 2023-08-29 RX ORDER — GINSENG 100 MG
1 CAPSULE ORAL ONCE
Status: COMPLETED | OUTPATIENT
Start: 2023-08-29 | End: 2023-08-29

## 2023-08-29 RX ADMIN — BACITRACIN 1 SMALL APPLICATION: 500 OINTMENT TOPICAL at 09:33

## 2023-08-29 NOTE — ED PROVIDER NOTES
History  Chief Complaint   Patient presents with   • Wound Check     Pt presents to the ED for reeval of wound. Return to ED following I and D of LLE hematoma. Marilu Thomas is an 82 y.o. female who presents as instructed for a 2 day wound check. She was here on 8/27 by Kallie Hong with the chief complaint of a painful swelling to the lateral aspect of her left leg. This was determined to be a hematoma. Patient is on xarelto. It was decided at that time to attempt drainage of the hematoma due to the size/pain. Packing was applied and the patient was put on abx to prevent cellulitis as she is prone to this. History provided by:  Patient and medical records   used: No    Wound Check   She was treated in the ED 2 to 3 days ago. Prior ED Treatment: I&D of Hematoma. There has been no treatment since the wound repair. There has been no drainage from the wound. The swelling has improved. The pain has improved. Prior to Admission Medications   Prescriptions Last Dose Informant Patient Reported? Taking?    Calcium Acetate, Phos Binder, (CALCIUM ACETATE PO)  Self Yes No   Sig: Take by mouth daily     acetaminophen (TYLENOL) 325 mg tablet  Self No No   Sig: Take 3 tablets (975 mg total) by mouth every 8 (eight) hours   Patient taking differently: Take 975 mg by mouth every 8 (eight) hours As Needed   clindamycin (CLEOCIN) 300 MG capsule   No No   Sig: Take 1 capsule (300 mg total) by mouth 3 (three) times a day for 7 days   clobetasol (TEMOVATE) 0.05 % ointment  Self No No   Sig: Apply once weekly to the affected area   famotidine (PEPCID) 40 MG tablet  Self No No   Sig: TAKE 1 TABLET BY MOUTH EVERY DAY   fluticasone (FLONASE) 50 mcg/act nasal spray  Self No No   Sig: SPRAY 1 SPRAY INTO EACH NOSTRIL EVERY DAY   gabapentin (NEURONTIN) 800 mg tablet  Self No No   Sig: TAKE 1 TABLET BY MOUTH FOUR TIMES A DAY   loratadine (CLARITIN) 10 mg tablet  Self Yes No   Sig: Take 10 mg by mouth daily magnesium 30 MG tablet  Self Yes No   Sig: Take 30 mg by mouth in the morning   metoprolol tartrate (LOPRESSOR) 50 mg tablet  Self No No   Sig: TAKE 1.5 TABLETS BY MOUTH 2 TIMES A DAY. multivitamin (THERAGRAN) TABS  Self Yes No   Sig: Take 1 tablet by mouth daily   pramipexole (MIRAPEX) 0.5 mg tablet  Self No No   Sig: TAKE 2 FULL TABLETS TWICE A DAY AT 5:00 P. M. AND 10:00 P. M.   rivaroxaban (Xarelto) 20 mg tablet  Self No No   Sig: Take 1 tablet (20 mg total) by mouth daily with breakfast   torsemide (DEMADEX) 20 mg tablet  Self No No   Sig: Take 1 tablet (20 mg total) by mouth daily      Facility-Administered Medications: None       Past Medical History:   Diagnosis Date   • Arthritis    • Atrial fibrillation ([address])    • Carrero's esophagus     last assessed: 1/23/2018   • BRCA1 negative    • BRCA2 negative    • Breast cancer ([address]) 2006    stage 1 (left), given adjuvant radiation with Arimidex x 5 years   • Cancer ([address]) 2006    Left Breast, Lumpectomy   • Cataract     last assessed: 3/11/2014   • Chronic diastolic CHF (congestive heart failure) ([address]) 11/21/2022   • Dysplasia of toenail     last assessed: 8/29/2017   • Esophageal reflux    • GERD (gastroesophageal reflux disease)    • Gross hematuria     last assessed: 2/19/2015   • Hematuria    • Hiatal hernia    • History of radiation therapy    • Hypertension    • Irregular heart beat     AFIB   • Mixed sensory-motor polyneuropathy    • Neuropathy    • Obesity    • Pacemaker    • Paroxysmal atrial fibrillation [facility])    • Peripheral neuropathy    • Rectal bleeding    • Restless leg syndrome    • Shortness of breath     last assessed: 1/11/2016       Past Surgical History:   Procedure Laterality Date   • BREAST BIOPSY Left 2006   • BREAST LUMPECTOMY Left 2006    onset: 2006   • BREAST SURGERY     • CARDIAC PACEMAKER PLACEMENT      x 3 2006   • CATARACT EXTRACTION     • COLONOSCOPY     • CYSTOSCOPY  04/04/2014    diagnostic   • HYSTERECTOMY      ALISON BSO; due to fibroid uterus; age 38   • KNEE CARTILAGE SURGERY      excision lesion of meniscus or capsule knee   • KNEE SURGERY     • OOPHORECTOMY Bilateral     age 38   • OTHER SURGICAL HISTORY      radiation therapy   • MI ARTHRP KNE CONDYLE&PLATU MEDIAL&LAT COMPARTMENTS Left 08/17/2020    Procedure: ARTHROPLASTY KNEE TOTAL;  Surgeon: Emily Sears DO;  Location: [facility];  Service: Orthopedics   • MI ARTHRP KNE CONDYLE&PLATU MEDIAL&LAT COMPARTMENTS Right 03/28/2022    Procedure: ARTHROPLASTY KNEE TOTAL W ROBOT - RIGHT;  Surgeon: Emily Sears DO;  Location: [facility];  Service: Orthopedics   • MI COLONOSCOPY FLX DX W/COLLJ SPEC WHEN PFRMD N/A 02/08/2017    Procedure: COLONOSCOPY;  Surgeon: Zahra Plaza MD;  Location: [facility]; Service: Gastroenterology   • MI ESOPHAGOGASTRODUODENOSCOPY TRANSORAL DIAGNOSTIC N/A 09/20/2017    Procedure: ESOPHAGOGASTRODUODENOSCOPY (EGD); Surgeon: Nurys Mays MD;  Location: [facility]; Service: Gastroenterology   • UPPER GASTROINTESTINAL ENDOSCOPY         Family History   Problem Relation Age of Onset   • Hypertension Mother    • Heart disease Father    • Aneurysm Father    • Coronary artery disease Father         in his 76s with aneurysm   • Aortic aneurysm Father         abdominal   • Scleroderma Sister    • Breast cancer Sister 76   • Hypertension Sister    • Cancer Sister    • No Known Problems Son    • No Known Problems Son    • Testicular cancer Son 39   • Thyroid cancer Son 45   • Colon cancer Maternal Aunt    • Colon cancer Maternal Aunt    • Breast cancer Other 48        kaylee's daughter   • Alcohol abuse Neg Hx    • Substance Abuse Neg Hx    • Mental illness Neg Hx    • Depression Neg Hx      I have reviewed and agree with the history as documented.     E-Cigarette/Vaping   • E-Cigarette Use Never User      E-Cigarette/Vaping Substances   • Nicotine No    • THC No    • CBD No    • Flavoring No    • Other No    • Unknown No      Social History     Tobacco Use   • Smoking status: Former     Packs/day: 1.00     Years: 25.00     Total pack years: 25.00     Types: Cigarettes     Quit date: 1980     Years since quittin.9   • Smokeless tobacco: Never   • Tobacco comments:     Quit over 30 years ago; quit age 41   Vaping Use   • Vaping Use: Never used   Substance Use Topics   • Alcohol use: Not Currently   • Drug use: No       Review of Systems   Constitutional: Negative for chills and fever. Skin: Positive for wound (incision wound left lower leg). Hematological: Bruises/bleeds easily. Physical Exam  Physical Exam  Vitals and nursing note reviewed. Constitutional:       General: She is not in acute distress. Appearance: She is well-developed. HENT:      Head: Normocephalic and atraumatic. Eyes:      Pupils: Pupils are equal, round, and reactive to light. Neck:      Vascular: No JVD. Cardiovascular:      Rate and Rhythm: Normal rate and regular rhythm. Heart sounds: Normal heart sounds. No murmur heard. No friction rub. No gallop. Pulmonary:      Effort: Pulmonary effort is normal. No respiratory distress. Breath sounds: Normal breath sounds. No wheezing or rales. Chest:      Chest wall: No tenderness. Musculoskeletal:         General: No tenderness. Normal range of motion. Cervical back: Normal range of motion. Skin:     General: Skin is warm and dry. Comments: Hematoma to left lower extremity, lateral shin, mild ttp, no warmth, no indication of infection   Neurological:      General: No focal deficit present. Mental Status: She is alert and oriented to person, place, and time. Psychiatric:         Behavior: Behavior normal.         Thought Content:  Thought content normal.         Judgment: Judgment normal.         Vital Signs  ED Triage Vitals [23 0915]   Temperature Pulse Respirations Blood Pressure SpO2   97.5 °F (36.4 °C) 103 (!) 24 132/86 96 %      Temp Source Heart Rate Source Patient Position - Orthostatic VS BP Location FiO2 (%)   Oral Monitor Sitting Right arm --      Pain Score       --           Vitals:    08/29/23 0915   BP: 132/86   Pulse: 103   Patient Position - Orthostatic VS: Sitting         Visual Acuity      ED Medications  Medications   bacitracin topical ointment 1 small application (1 small application Topical Given 8/29/23 0933)       Diagnostic Studies  Results Reviewed     None                 No orders to display              Procedures  Procedures         ED Course                                             Medical Decision Making  Patient for wound check and packing removal.  Wound does not appear infected, no ongoing drainage. Clearly a hematoma rather than an abscess. Will apply bacitracin and dressing and instruct patient to follow up with her PCP. Risk  OTC drugs. Disposition  Final diagnoses:   Leg hematoma, left, initial encounter   Visit for wound check     Time reflects when diagnosis was documented in both MDM as applicable and the Disposition within this note     Time User Action Codes Description Comment    8/29/2023  9:36 AM Renella Fake Add [S80.12XA] Leg hematoma, left, initial encounter     8/29/2023  9:36 AM Renella Fake Add [Z51.89] Visit for wound check     8/29/2023  9:36 AM Renella Fake Modify [S80.12XA] Leg hematoma, left, initial encounter     8/29/2023  9:36 AM Renella Fake Modify [Z51.89] Visit for wound check       ED Disposition     ED Disposition   Discharge    Condition   Stable    Date/Time   Tue Aug 29, 2023  9:35 AM    Comment   Leopoldo Serge Hopkins discharge to home/self care.                Follow-up Information     Follow up With Specialties Details Why Contact Info    Lalo Matthews MD Internal Medicine Schedule an appointment as soon as possible for a visit in 1 week  [address] [address]  [phone]            Patient's Medications   Discharge Prescriptions    No medications on file       No discharge procedures on file.     PDMP Review       Value Time User    PDMP Reviewed  Yes 4/22/2022 10:26 AM Amarilys Harper PA-C          ED Provider  Electronically Signed by           Krunal Mccormick MD  08/29/23 0082

## 2023-08-30 ENCOUNTER — TELEPHONE (OUTPATIENT)
Dept: INTERNAL MEDICINE CLINIC | Facility: CLINIC | Age: 83
End: 2023-08-30

## 2023-08-30 ENCOUNTER — HOSPITAL ENCOUNTER (OUTPATIENT)
Dept: RADIOLOGY | Facility: HOSPITAL | Age: 83
Discharge: HOME | End: 2023-08-30
Payer: MEDICARE

## 2023-08-30 ENCOUNTER — APPOINTMENT (OUTPATIENT)
Dept: LAB | Facility: CLINIC | Age: 83
End: 2023-08-30
Payer: MEDICARE

## 2023-08-30 ENCOUNTER — OFFICE VISIT (OUTPATIENT)
Dept: INTERNAL MEDICINE CLINIC | Facility: CLINIC | Age: 83
End: 2023-08-30
Payer: MEDICARE

## 2023-08-30 VITALS
OXYGEN SATURATION: 95 % | WEIGHT: 226.6 LBS | BODY MASS INDEX: 37.75 KG/M2 | TEMPERATURE: 97.4 F | HEART RATE: 97 BPM | SYSTOLIC BLOOD PRESSURE: 122 MMHG | HEIGHT: 65 IN | DIASTOLIC BLOOD PRESSURE: 82 MMHG

## 2023-08-30 DIAGNOSIS — I10 ESSENTIAL HYPERTENSION: ICD-10-CM

## 2023-08-30 DIAGNOSIS — M25.512 CHRONIC LEFT SHOULDER PAIN: ICD-10-CM

## 2023-08-30 DIAGNOSIS — J30.2 SEASONAL ALLERGIES: ICD-10-CM

## 2023-08-30 DIAGNOSIS — E87.5 HYPERKALEMIA: Primary | ICD-10-CM

## 2023-08-30 DIAGNOSIS — L98.9 SKIN LESIONS: ICD-10-CM

## 2023-08-30 DIAGNOSIS — N18.31 STAGE 3A CHRONIC KIDNEY DISEASE (HCC): ICD-10-CM

## 2023-08-30 DIAGNOSIS — G89.29 CHRONIC LEFT SHOULDER PAIN: ICD-10-CM

## 2023-08-30 DIAGNOSIS — L98.9 LESION OF FINGER: ICD-10-CM

## 2023-08-30 DIAGNOSIS — I50.32 CHRONIC DIASTOLIC CHF (CONGESTIVE HEART FAILURE) (HCC): ICD-10-CM

## 2023-08-30 DIAGNOSIS — K22.719 BARRETT'S ESOPHAGUS WITH DYSPLASIA: ICD-10-CM

## 2023-08-30 DIAGNOSIS — I48.20 CHRONIC ATRIAL FIBRILLATION (HCC): ICD-10-CM

## 2023-08-30 DIAGNOSIS — G25.81 RLS (RESTLESS LEGS SYNDROME): ICD-10-CM

## 2023-08-30 DIAGNOSIS — S80.12XA TRAUMATIC HEMATOMA OF LEFT LOWER LEG, INITIAL ENCOUNTER: Primary | ICD-10-CM

## 2023-08-30 PROBLEM — Z96.651 STATUS POST TOTAL RIGHT KNEE REPLACEMENT USING CEMENT: Status: RESOLVED | Noted: 2023-03-22 | Resolved: 2023-08-30

## 2023-08-30 PROBLEM — I35.0 NONRHEUMATIC AORTIC VALVE STENOSIS: Status: ACTIVE | Noted: 2023-08-30

## 2023-08-30 LAB
ALBUMIN SERPL BCP-MCNC: 4.3 G/DL (ref 3.5–5)
ALP SERPL-CCNC: 67 U/L (ref 34–104)
ALT SERPL W P-5'-P-CCNC: 13 U/L (ref 7–52)
ANION GAP SERPL CALCULATED.3IONS-SCNC: 4 MMOL/L
AST SERPL W P-5'-P-CCNC: 19 U/L (ref 13–39)
BILIRUB SERPL-MCNC: 1.23 MG/DL (ref 0.2–1)
BUN SERPL-MCNC: 20 MG/DL (ref 5–25)
CALCIUM SERPL-MCNC: 9.7 MG/DL (ref 8.4–10.2)
CHLORIDE SERPL-SCNC: 104 MMOL/L (ref 96–108)
CO2 SERPL-SCNC: 30 MMOL/L (ref 21–32)
CREAT SERPL-MCNC: 0.81 MG/DL (ref 0.6–1.3)
GFR SERPL CREATININE-BSD FRML MDRD: 68 ML/MIN/1.73SQ M
GLUCOSE P FAST SERPL-MCNC: 91 MG/DL (ref 65–99)
POTASSIUM SERPL-SCNC: 5.7 MMOL/L (ref 3.5–5.3)
PROT SERPL-MCNC: 7.8 G/DL (ref 6.4–8.4)
SODIUM SERPL-SCNC: 138 MMOL/L (ref 135–147)

## 2023-08-30 PROCEDURE — 99214 OFFICE O/P EST MOD 30 MIN: CPT | Performed by: INTERNAL MEDICINE

## 2023-08-30 PROCEDURE — 73030 X-RAY EXAM OF SHOULDER: CPT

## 2023-08-30 PROCEDURE — 80053 COMPREHEN METABOLIC PANEL: CPT

## 2023-08-30 PROCEDURE — 36415 COLL VENOUS BLD VENIPUNCTURE: CPT

## 2023-08-30 NOTE — ASSESSMENT & PLAN NOTE
Lab Results   Component Value Date    HGBA1C 5.8 (H) 08/22/2023     A1c slightly worse, not on medication.

## 2023-08-30 NOTE — ASSESSMENT & PLAN NOTE
Wt Readings from Last 3 Encounters:   08/30/23 103 kg (226 lb 9.6 oz)   08/26/23 103 kg (227 lb 15.3 oz)   08/22/23 103 kg (228 lb)     Stable weight. On daily torsemide.

## 2023-08-30 NOTE — PROGRESS NOTES
Assessment/Plan:    Nonrheumatic aortic valve stenosis  Cardiac cath scheduled. Follow up with surgery after. Atrial fibrillation ([address]) [I48.91]  Switched to Xarelto recently. On metoprolol. Stage 3a chronic kidney disease [facility])  Lab Results   Component Value Date    EGFR 61 08/22/2023    EGFR 49 04/12/2023    EGFR 61 11/09/2022    CREATININE 0.89 08/22/2023    CREATININE 1.06 04/12/2023    CREATININE 0.89 11/09/2022     Stable. No NSAIDs. RLS (restless legs syndrome)  Stable, on gabapentin and pramipexole. Per neurology. Carrero's esophagus with dysplasia  Taking famotidine. Diabetic polyneuropathy associated with type 2 diabetes mellitus ([address])    Lab Results   Component Value Date    HGBA1C 5.8 (H) 08/22/2023     A1c slightly worse, not on medication. Essential hypertension  BP stable, on metoprolol and torsemide. Seasonal allergies  On daily antihistamine, instructed to use saline spray more often. Severe obesity (BMI 35.0-39. 9) with comorbidity ([address])  Weight stable. Status post total knee replacement using cement, right  Recently saw Ortho. Chronic left shoulder pain  Suspect OA. Check x-ray, discussed Ortho referral.    Chronic diastolic CHF (congestive heart failure) (HCC)  Wt Readings from Last 3 Encounters:   08/30/23 103 kg (226 lb 9.6 oz)   08/26/23 103 kg (227 lb 15.3 oz)   08/22/23 103 kg (228 lb)     Stable weight. On daily torsemide. Diagnoses and all orders for this visit:    Traumatic hematoma of left lower leg, initial encounter  Comments:  s/p I&D at the ER. Wound healing, no discharge, minimal bleeding. Instructed to wrap at least once a day. Wound check in 2 days. Finish antibiotics. Chronic left shoulder pain  -     XR shoulder 2+ vw left; Future    Essential hypertension  -     Comprehensive metabolic panel;  Future    Stage 3a chronic kidney disease (HCC)    Chronic diastolic CHF (congestive heart failure) (HCC)    Carrero's esophagus with dysplasia    Chronic atrial fibrillation (HCC)    Seasonal allergies    RLS (restless legs syndrome)    Lesion of finger  Comments:  Suspect wart. Declined treatment, reassured. Skin lesions  Comments:  Suspect cherry angiomas, declined to see dermatology at this time. Follow up in 4 months or as needed. Wound check in 2 days. Subjective:      Patient ID: Maurice Awan is a 82 y.o. female here for a follow up. She reports her wound feels sore at the back. She reports minimal bleeding, still has a bump at the back of her left leg. She is very worried that it will get infected, she is taking clindamycin. She was at the ER several times the past week, wound was open but nothing came out. She complains of left shoulder pain, no known injury. Pain can be severe, sometimes feels a shock in her arm. No chest pain or pressure. She is concerned about a callus on her right pointer finger, no bleeding or discharge. She cannot remove it and it is bothering her. She is also concerned about several red spots on her abdomen because is looks like it has jagged edges. She has not seen dermatology. She switched to Xarelto about a month ago, noticed headaches which lasted for 3 weeks. She thinks it might have been a sinus headache and not the Xarelto. She does not use her nasal sprays regularly. The following portions of the patient's history were reviewed and updated as appropriate: allergies, current medications, past medical history, past social history and problem list.    Review of Systems   Constitutional: Negative for activity change and appetite change. HENT: Negative for congestion, postnasal drip and rhinorrhea. Respiratory: Positive for shortness of breath. Cardiovascular: Negative for chest pain and leg swelling. Musculoskeletal: Negative for arthralgias. Skin: Positive for wound. Negative for rash. Neurological: Positive for headaches. Negative for dizziness and weakness. Objective:      /82   Pulse 97   Temp (!) 97.4 °F (36.3 °C)   Ht 5' 5" (1.651 m)   Wt 103 kg (226 lb 9.6 oz)   SpO2 95%   BMI 37.71 kg/m²          Physical Exam  Vitals and nursing note reviewed. Constitutional:       General: She is not in acute distress. Appearance: She is well-developed. HENT:      Head: Normocephalic and atraumatic. Eyes:      Pupils: Pupils are equal, round, and reactive to light. Cardiovascular:      Rate and Rhythm: Normal rate and regular rhythm. Heart sounds: Normal heart sounds. Pulmonary:      Effort: Pulmonary effort is normal.      Breath sounds: Normal breath sounds. No wheezing. Abdominal:      General: Bowel sounds are normal.      Palpations: Abdomen is soft. Musculoskeletal:         General: No swelling. Right lower leg: No edema. Left lower leg: No edema. Skin:     General: Skin is warm. Findings: Bruising, ecchymosis and wound present. No abscess or rash. Neurological:      General: No focal deficit present. Mental Status: She is alert and oriented to person, place, and time. Psychiatric:         Mood and Affect: Mood and affect normal. Mood is not anxious or depressed. Behavior: Behavior normal.           Labs & imaging results reviewed with patient.

## 2023-08-30 NOTE — TELEPHONE ENCOUNTER
Your potassium is a bit high. Are you taking any potassium supplements? If not, please limit certain foods: bananas, tomatoes, avocado, spinach, cantaloupe, melons, dried fruits, citrus juices and yogurt. Repeat labs ordered next week.

## 2023-08-30 NOTE — ASSESSMENT & PLAN NOTE
Lab Results   Component Value Date    EGFR 61 08/22/2023    EGFR 49 04/12/2023    EGFR 61 11/09/2022    CREATININE 0.89 08/22/2023    CREATININE 1.06 04/12/2023    CREATININE 0.89 11/09/2022     Stable. No NSAIDs.

## 2023-08-31 LAB
BACTERIA WND AEROBE CULT: ABNORMAL
GRAM STN SPEC: ABNORMAL

## 2023-09-01 ENCOUNTER — OFFICE VISIT (OUTPATIENT)
Dept: INTERNAL MEDICINE CLINIC | Facility: CLINIC | Age: 83
End: 2023-09-01
Payer: MEDICARE

## 2023-09-01 VITALS
BODY MASS INDEX: 37.65 KG/M2 | TEMPERATURE: 96.4 F | SYSTOLIC BLOOD PRESSURE: 126 MMHG | HEIGHT: 65 IN | HEART RATE: 92 BPM | OXYGEN SATURATION: 96 % | WEIGHT: 226 LBS | DIASTOLIC BLOOD PRESSURE: 76 MMHG

## 2023-09-01 DIAGNOSIS — S80.12XS TRAUMATIC HEMATOMA OF LEFT LOWER LEG, SEQUELA: Primary | ICD-10-CM

## 2023-09-01 DIAGNOSIS — R29.898 WEAKNESS OF BOTH HANDS: ICD-10-CM

## 2023-09-01 DIAGNOSIS — E87.5 HYPERKALEMIA: ICD-10-CM

## 2023-09-01 DIAGNOSIS — J30.2 SEASONAL ALLERGIES: ICD-10-CM

## 2023-09-01 PROCEDURE — 99213 OFFICE O/P EST LOW 20 MIN: CPT | Performed by: INTERNAL MEDICINE

## 2023-09-01 NOTE — PROGRESS NOTES
Name: Trino Florence      : 1940      MRN: [id number]  Encounter Provider: Abdi Lima MD  Encounter Date: 2023   Encounter department: [id number] [facility]     1. Traumatic hematoma of left lower leg, sequela  Comments:  Wound is healing slowly. Instructed not to scrub area when washing it, leave scab to form. Given wound care instructed. Do not apply any ointment/cream on wound    2. Seasonal allergies  Assessment & Plan:  Start saline nasal spray bid, continue Flonase daily. 3. Hyperkalemia  Comments:  Resume daily torsemide. Repeat labs next week. 4. Weakness of both hands  Comments:  Recommend hand exercises, this was demonstrated to her. Less likely neuropathy. Consider OT. Follow up as scheduled or as needed. Wound check in 1 week. Subjective      She is here today for a wound check. She reports using a wash cloth when she washes her wound 1 or 2 times a day. She noticed a scab come off when she did it yesterday. She also applied Neosporin on it. She has not been taking her torsemide for the past week because she has been out of the house. She did not want to suddenly urinate of have an accident. She did take it today. She started using the saline spray, with the Flonase. She complains of hand weakness. She feels she cannot lift the plate, would sometimes drop it. She is asking if it is due to neuropathy. Review of Systems   Constitutional: Negative for activity change and appetite change. HENT: Positive for postnasal drip and rhinorrhea. Musculoskeletal: Negative for gait problem. Skin: Positive for color change and wound.        Current Outpatient Medications on File Prior to Visit   Medication Sig   • acetaminophen (TYLENOL) 325 mg tablet Take 3 tablets (975 mg total) by mouth every 8 (eight) hours (Patient taking differently: Take 975 mg by mouth every 8 (eight) hours As Needed)   • Calcium Acetate, Phos Binder, (CALCIUM ACETATE PO) Take by mouth daily     • clindamycin (CLEOCIN) 300 MG capsule Take 1 capsule (300 mg total) by mouth 3 (three) times a day for 7 days   • clobetasol (TEMOVATE) 0.05 % ointment Apply once weekly to the affected area   • famotidine (PEPCID) 40 MG tablet TAKE 1 TABLET BY MOUTH EVERY DAY   • fluticasone (FLONASE) 50 mcg/act nasal spray SPRAY 1 SPRAY INTO EACH NOSTRIL EVERY DAY   • gabapentin (NEURONTIN) 800 mg tablet TAKE 1 TABLET BY MOUTH FOUR TIMES A DAY   • loratadine (CLARITIN) 10 mg tablet Take 10 mg by mouth daily   • magnesium 30 MG tablet Take 30 mg by mouth in the morning   • metoprolol tartrate (LOPRESSOR) 50 mg tablet TAKE 1.5 TABLETS BY MOUTH 2 TIMES A DAY. • multivitamin (THERAGRAN) TABS Take 1 tablet by mouth daily   • pramipexole (MIRAPEX) 0.5 mg tablet TAKE 2 FULL TABLETS TWICE A DAY AT 5:00 P. M. AND 10:00 P. M.   • rivaroxaban (Xarelto) 20 mg tablet Take 1 tablet (20 mg total) by mouth daily with breakfast   • torsemide (DEMADEX) 20 mg tablet Take 1 tablet (20 mg total) by mouth daily       Objective     /76   Pulse 92   Temp (!) 96.4 °F (35.8 °C)   Ht 5' 5" (1.651 m)   Wt 103 kg (226 lb)   SpO2 96%   BMI 37.61 kg/m²     Physical Exam  Constitutional:       Appearance: Normal appearance. HENT:      Head: Normocephalic and atraumatic. Mouth/Throat:      Mouth: Mucous membranes are moist.   Pulmonary:      Effort: Pulmonary effort is normal. No respiratory distress. Musculoskeletal:      Right wrist: Normal. No swelling. Left wrist: Normal. No swelling. Right hand: Normal. No swelling. Left hand: Normal. No swelling. Skin:         Neurological:      General: No focal deficit present. Mental Status: She is alert and oriented to person, place, and time.    Psychiatric:         Mood and Affect: Mood normal.         Behavior: Behavior normal.       Georgiana Conti MD

## 2023-09-01 NOTE — PATIENT INSTRUCTIONS
Wash wound with soapy water, do not scrub or use wash cloth. After washing, leave wound open to dry. Do NOT apply any creams, ointment, Neosporin etc.    If with no scab: apply dressing at all times. You can change it as needed, 1 to 2 times a day. If with scab, you can leave the wound open. No need to apply dressing if you are staying at home. You can apply a dressing if you are leaving the house. Please take your water pill daily. Repeat blood work next week. Exercise your hands daily: open/close, finger to finger for each hand, wrist up/ down and side to side. Use saline nasal spray, 1-2 sprays each nostril, 1 to 2 times a day. Continue using Flonase daily.

## 2023-09-05 ENCOUNTER — HOSPITAL ENCOUNTER (INPATIENT)
Facility: HOSPITAL | Age: 83
LOS: 3 days | Discharge: HOME HEALTH | End: 2023-09-08
Attending: INTERNAL MEDICINE | Admitting: INTERNAL MEDICINE
Payer: MEDICARE

## 2023-09-05 ENCOUNTER — APPOINTMENT (OUTPATIENT)
Dept: RADIOLOGY | Facility: HOSPITAL | Age: 83
End: 2023-09-05
Payer: MEDICARE

## 2023-09-05 ENCOUNTER — APPOINTMENT (INPATIENT)
Dept: NON INVASIVE DIAGNOSTICS | Facility: HOSPITAL | Age: 83
End: 2023-09-05
Payer: MEDICARE

## 2023-09-05 DIAGNOSIS — I35.0 NONRHEUMATIC AORTIC VALVE STENOSIS: ICD-10-CM

## 2023-09-05 DIAGNOSIS — S81.809D NON-HEALING WOUND OF LOWER EXTREMITY, SUBSEQUENT ENCOUNTER: Primary | ICD-10-CM

## 2023-09-05 DIAGNOSIS — S81.802A NON-HEALING WOUND OF LEFT LOWER EXTREMITY: ICD-10-CM

## 2023-09-05 LAB
ALBUMIN SERPL BCP-MCNC: 4 G/DL (ref 3.5–5)
ALP SERPL-CCNC: 68 U/L (ref 34–104)
ALT SERPL W P-5'-P-CCNC: 11 U/L (ref 7–52)
ANION GAP SERPL CALCULATED.3IONS-SCNC: 5 MMOL/L
AST SERPL W P-5'-P-CCNC: 20 U/L (ref 13–39)
ATRIAL RATE: 92 BPM
BASOPHILS # BLD AUTO: 0.05 THOUSANDS/ÂΜL (ref 0–0.1)
BASOPHILS NFR BLD AUTO: 1 % (ref 0–1)
BILIRUB SERPL-MCNC: 0.94 MG/DL (ref 0.2–1)
BUN SERPL-MCNC: 27 MG/DL (ref 5–25)
CALCIUM SERPL-MCNC: 9.1 MG/DL (ref 8.4–10.2)
CHLORIDE SERPL-SCNC: 101 MMOL/L (ref 96–108)
CO2 SERPL-SCNC: 33 MMOL/L (ref 21–32)
CREAT SERPL-MCNC: 0.94 MG/DL (ref 0.6–1.3)
EOSINOPHIL # BLD AUTO: 0.01 THOUSAND/ÂΜL (ref 0–0.61)
EOSINOPHIL NFR BLD AUTO: 0 % (ref 0–6)
ERYTHROCYTE [DISTWIDTH] IN BLOOD BY AUTOMATED COUNT: 14.4 % (ref 11.6–15.1)
GFR SERPL CREATININE-BSD FRML MDRD: 57 ML/MIN/1.73SQ M
GLUCOSE SERPL-MCNC: 110 MG/DL (ref 65–140)
HCT VFR BLD AUTO: 39.2 % (ref 34.8–46.1)
HGB BLD-MCNC: 12.8 G/DL (ref 11.5–15.4)
IMM GRANULOCYTES # BLD AUTO: 0.01 THOUSAND/UL (ref 0–0.2)
IMM GRANULOCYTES NFR BLD AUTO: 0 % (ref 0–2)
LYMPHOCYTES # BLD AUTO: 1.97 THOUSANDS/ÂΜL (ref 0.6–4.47)
LYMPHOCYTES NFR BLD AUTO: 30 % (ref 14–44)
MCH RBC QN AUTO: 31.3 PG (ref 26.8–34.3)
MCHC RBC AUTO-ENTMCNC: 32.7 G/DL (ref 31.4–37.4)
MCV RBC AUTO: 96 FL (ref 82–98)
MONOCYTES # BLD AUTO: 0.52 THOUSAND/ÂΜL (ref 0.17–1.22)
MONOCYTES NFR BLD AUTO: 8 % (ref 4–12)
NEUTROPHILS # BLD AUTO: 4.02 THOUSANDS/ÂΜL (ref 1.85–7.62)
NEUTS SEG NFR BLD AUTO: 61 % (ref 43–75)
NRBC BLD AUTO-RTO: 0 /100 WBCS
PLATELET # BLD AUTO: 227 THOUSANDS/UL (ref 149–390)
PMV BLD AUTO: 10.9 FL (ref 8.9–12.7)
POTASSIUM SERPL-SCNC: 3.9 MMOL/L (ref 3.5–5.3)
PROCALCITONIN SERPL-MCNC: <0.05 NG/ML
PROT SERPL-MCNC: 6.9 G/DL (ref 6.4–8.4)
QRS AXIS: 15 DEGREES
QRSD INTERVAL: 114 MS
QT INTERVAL: 372 MS
QTC INTERVAL: 424 MS
RBC # BLD AUTO: 4.09 MILLION/UL (ref 3.81–5.12)
SODIUM SERPL-SCNC: 139 MMOL/L (ref 135–147)
T WAVE AXIS: -44 DEGREES
VENTRICULAR RATE: 78 BPM
WBC # BLD AUTO: 6.58 THOUSAND/UL (ref 4.31–10.16)

## 2023-09-05 PROCEDURE — 99152 MOD SED SAME PHYS/QHP 5/>YRS: CPT | Performed by: INTERNAL MEDICINE

## 2023-09-05 PROCEDURE — 93923 UPR/LXTR ART STDY 3+ LVLS: CPT

## 2023-09-05 PROCEDURE — 93454 CORONARY ARTERY ANGIO S&I: CPT | Performed by: INTERNAL MEDICINE

## 2023-09-05 PROCEDURE — C1769 GUIDE WIRE: HCPCS | Performed by: INTERNAL MEDICINE

## 2023-09-05 PROCEDURE — 99223 1ST HOSP IP/OBS HIGH 75: CPT | Performed by: INTERNAL MEDICINE

## 2023-09-05 PROCEDURE — 80053 COMPREHEN METABOLIC PANEL: CPT | Performed by: INTERNAL MEDICINE

## 2023-09-05 PROCEDURE — 82948 REAGENT STRIP/BLOOD GLUCOSE: CPT

## 2023-09-05 PROCEDURE — C1894 INTRO/SHEATH, NON-LASER: HCPCS | Performed by: INTERNAL MEDICINE

## 2023-09-05 PROCEDURE — 93005 ELECTROCARDIOGRAM TRACING: CPT

## 2023-09-05 PROCEDURE — 93010 ELECTROCARDIOGRAM REPORT: CPT | Performed by: INTERNAL MEDICINE

## 2023-09-05 PROCEDURE — B2111ZZ FLUOROSCOPY OF MULTIPLE CORONARY ARTERIES USING LOW OSMOLAR CONTRAST: ICD-10-PCS | Performed by: INTERNAL MEDICINE

## 2023-09-05 PROCEDURE — 99222 1ST HOSP IP/OBS MODERATE 55: CPT | Performed by: PODIATRIST

## 2023-09-05 PROCEDURE — 93923 UPR/LXTR ART STDY 3+ LVLS: CPT | Performed by: SURGERY

## 2023-09-05 PROCEDURE — 85025 COMPLETE CBC W/AUTO DIFF WBC: CPT | Performed by: INTERNAL MEDICINE

## 2023-09-05 PROCEDURE — 84145 PROCALCITONIN (PCT): CPT | Performed by: INTERNAL MEDICINE

## 2023-09-05 PROCEDURE — 93925 LOWER EXTREMITY STUDY: CPT

## 2023-09-05 PROCEDURE — 99153 MOD SED SAME PHYS/QHP EA: CPT | Performed by: INTERNAL MEDICINE

## 2023-09-05 PROCEDURE — 87040 BLOOD CULTURE FOR BACTERIA: CPT | Performed by: INTERNAL MEDICINE

## 2023-09-05 PROCEDURE — 93925 LOWER EXTREMITY STUDY: CPT | Performed by: SURGERY

## 2023-09-05 PROCEDURE — 73590 X-RAY EXAM OF LOWER LEG: CPT

## 2023-09-05 RX ORDER — FAMOTIDINE 20 MG/1
40 TABLET, FILM COATED ORAL
Status: DISCONTINUED | OUTPATIENT
Start: 2023-09-05 | End: 2023-09-08 | Stop reason: HOSPADM

## 2023-09-05 RX ORDER — ACETAMINOPHEN 325 MG/1
650 TABLET ORAL EVERY 6 HOURS PRN
Status: DISCONTINUED | OUTPATIENT
Start: 2023-09-05 | End: 2023-09-08 | Stop reason: HOSPADM

## 2023-09-05 RX ORDER — SODIUM CHLORIDE 9 MG/ML
125 INJECTION, SOLUTION INTRAVENOUS CONTINUOUS
Status: DISCONTINUED | OUTPATIENT
Start: 2023-09-05 | End: 2023-09-05

## 2023-09-05 RX ORDER — VERAPAMIL HCL 2.5 MG/ML
AMPUL (ML) INTRAVENOUS CODE/TRAUMA/SEDATION MEDICATION
Status: DISCONTINUED | OUTPATIENT
Start: 2023-09-05 | End: 2023-09-05 | Stop reason: HOSPADM

## 2023-09-05 RX ORDER — ACETAMINOPHEN 325 MG/1
650 TABLET ORAL EVERY 4 HOURS PRN
Status: DISCONTINUED | OUTPATIENT
Start: 2023-09-05 | End: 2023-09-05

## 2023-09-05 RX ORDER — CLOPIDOGREL BISULFATE 75 MG/1
600 TABLET ORAL ONCE
Status: COMPLETED | OUTPATIENT
Start: 2023-09-05 | End: 2023-09-05

## 2023-09-05 RX ORDER — FENTANYL CITRATE 50 UG/ML
INJECTION, SOLUTION INTRAMUSCULAR; INTRAVENOUS CODE/TRAUMA/SEDATION MEDICATION
Status: DISCONTINUED | OUTPATIENT
Start: 2023-09-05 | End: 2023-09-05 | Stop reason: HOSPADM

## 2023-09-05 RX ORDER — LORATADINE 10 MG/1
10 TABLET ORAL DAILY
Status: DISCONTINUED | OUTPATIENT
Start: 2023-09-05 | End: 2023-09-08 | Stop reason: HOSPADM

## 2023-09-05 RX ORDER — HEPARIN SODIUM 1000 [USP'U]/ML
INJECTION, SOLUTION INTRAVENOUS; SUBCUTANEOUS CODE/TRAUMA/SEDATION MEDICATION
Status: DISCONTINUED | OUTPATIENT
Start: 2023-09-05 | End: 2023-09-05 | Stop reason: HOSPADM

## 2023-09-05 RX ORDER — ASPIRIN 81 MG/1
324 TABLET, CHEWABLE ORAL ONCE
Status: COMPLETED | OUTPATIENT
Start: 2023-09-05 | End: 2023-09-05

## 2023-09-05 RX ORDER — LIDOCAINE HYDROCHLORIDE 10 MG/ML
INJECTION, SOLUTION EPIDURAL; INFILTRATION; INTRACAUDAL; PERINEURAL CODE/TRAUMA/SEDATION MEDICATION
Status: DISCONTINUED | OUTPATIENT
Start: 2023-09-05 | End: 2023-09-05 | Stop reason: HOSPADM

## 2023-09-05 RX ORDER — INSULIN LISPRO 100 [IU]/ML
1-5 INJECTION, SOLUTION INTRAVENOUS; SUBCUTANEOUS
Status: DISCONTINUED | OUTPATIENT
Start: 2023-09-06 | End: 2023-09-08 | Stop reason: HOSPADM

## 2023-09-05 RX ORDER — INSULIN LISPRO 100 [IU]/ML
1-5 INJECTION, SOLUTION INTRAVENOUS; SUBCUTANEOUS
Status: DISCONTINUED | OUTPATIENT
Start: 2023-09-05 | End: 2023-09-08 | Stop reason: HOSPADM

## 2023-09-05 RX ORDER — TORSEMIDE 20 MG/1
20 TABLET ORAL DAILY
Status: DISCONTINUED | OUTPATIENT
Start: 2023-09-06 | End: 2023-09-08 | Stop reason: HOSPADM

## 2023-09-05 RX ORDER — PRAMIPEXOLE DIHYDROCHLORIDE 1 MG/1
1 TABLET ORAL 2 TIMES DAILY
Status: DISCONTINUED | OUTPATIENT
Start: 2023-09-05 | End: 2023-09-06

## 2023-09-05 RX ORDER — ONDANSETRON 2 MG/ML
4 INJECTION INTRAMUSCULAR; INTRAVENOUS EVERY 6 HOURS PRN
Status: DISCONTINUED | OUTPATIENT
Start: 2023-09-05 | End: 2023-09-08 | Stop reason: HOSPADM

## 2023-09-05 RX ORDER — MIDAZOLAM HYDROCHLORIDE 2 MG/2ML
INJECTION, SOLUTION INTRAMUSCULAR; INTRAVENOUS CODE/TRAUMA/SEDATION MEDICATION
Status: DISCONTINUED | OUTPATIENT
Start: 2023-09-05 | End: 2023-09-05 | Stop reason: HOSPADM

## 2023-09-05 RX ORDER — FLUTICASONE PROPIONATE 50 MCG
1 SPRAY, SUSPENSION (ML) NASAL DAILY
Status: DISCONTINUED | OUTPATIENT
Start: 2023-09-06 | End: 2023-09-08 | Stop reason: HOSPADM

## 2023-09-05 RX ORDER — NITROGLYCERIN 0.4 MG/1
0.4 TABLET SUBLINGUAL
Status: DISCONTINUED | OUTPATIENT
Start: 2023-09-05 | End: 2023-09-08 | Stop reason: HOSPADM

## 2023-09-05 RX ORDER — NITROGLYCERIN 20 MG/100ML
INJECTION INTRAVENOUS CODE/TRAUMA/SEDATION MEDICATION
Status: DISCONTINUED | OUTPATIENT
Start: 2023-09-05 | End: 2023-09-05 | Stop reason: HOSPADM

## 2023-09-05 RX ORDER — GABAPENTIN 400 MG/1
800 CAPSULE ORAL 4 TIMES DAILY
Status: DISCONTINUED | OUTPATIENT
Start: 2023-09-05 | End: 2023-09-08 | Stop reason: HOSPADM

## 2023-09-05 RX ADMIN — GABAPENTIN 800 MG: 400 CAPSULE ORAL at 21:31

## 2023-09-05 RX ADMIN — METOPROLOL TARTRATE 75 MG: 50 TABLET, FILM COATED ORAL at 21:31

## 2023-09-05 RX ADMIN — SODIUM CHLORIDE 125 ML/HR: 0.9 INJECTION, SOLUTION INTRAVENOUS at 07:23

## 2023-09-05 RX ADMIN — GABAPENTIN 800 MG: 400 CAPSULE ORAL at 17:03

## 2023-09-05 RX ADMIN — FAMOTIDINE 40 MG: 20 TABLET, FILM COATED ORAL at 21:31

## 2023-09-05 RX ADMIN — CLOPIDOGREL BISULFATE 600 MG: 75 TABLET ORAL at 07:19

## 2023-09-05 RX ADMIN — ASPIRIN 81 MG CHEWABLE TABLET 324 MG: 81 TABLET CHEWABLE at 07:20

## 2023-09-05 RX ADMIN — PRAMIPEXOLE DIHYDROCHLORIDE 1 MG: 1 TABLET ORAL at 17:03

## 2023-09-05 RX ADMIN — VANCOMYCIN HYDROCHLORIDE 1500 MG: 10 INJECTION, POWDER, LYOPHILIZED, FOR SOLUTION INTRAVENOUS at 23:36

## 2023-09-05 NOTE — PROGRESS NOTES
Podiatry - Consultation    Patient Information:   Ry Bailey 82 y.o. female MRN: [id number]  Unit/Bed#: [facility] 521-53 Encounter: [id number]  PCP: Casey Esparza MD  Date of Admission:  9/5/2023  Date of Consultation: 09/05/23  Requesting Physician: Danielle Kruse MD      ASSESSMENT:    Ry Bailey is a 82 y.o. female with:    1. Eschar, left lateral leg  2. Type 2 diabetes with polyneuropathy  3. Venous insufficiency  4. Peripheral arterial sclerosis  5. CHF  6. CKD stage III  7. Chronic edema    PLAN:    · Patient evaluated at bedside. Stable eschar wound located to left lateral leg, no acute clinical signs of infection, no deep probing. · Plan to continue local wound care to left lateral extremity wound and close monitoring of wound. · Follow-up LEADs. Pending results, may place vascular consult. · Follow up radiographs of left leg  · Local wound care consisting of betadine, DSD. Wound care instructions placed. · Elevation on green foam wedges or pillows when non-ambulatory  · Rest of care per primary team.  · Will discuss this plan with my attending and update as needed. Weightbearing status: Weightbearing as tolerated    SUBJECTIVE:    History of Present Illness:    Ry Bailey is a 82 y.o. female who is originally admitted 9/5/2023 due to . Patient has a past medical history of hypertension, multiple lung nodules, GERD, Carrero's esophagus, venous insufficiency, lumbar radiculopathy, type 2 diabetes with peripheral neuropathy, left breast cancer, peripheral arterial disease,'s CKD stage III,, chronic edema. We are consulted for a nonhealing left lower extremity wound. The patient states that approximately 2 weeks she hit her left lower leg on the car door, and it resulted in a wound.  She the jason to the emergency room for treatment of the wound, and she states that they were able to "drain the wound."  Through chart review, determined that she had an aspiration of a left lower extremity hematoma on 8/26/2023. She followed up with her primary care doctor who advised her to not scrub the area and to keep it dry to allow a scab to form. She states that is does look like the wound has dried up, and she denies seeing/having any acute clinical signs of infection - no yellow drainage, no ascending redness, no fevers or chills. Today she denies N/V/SOB/F/C/CP      Review of Systems:    Constitutional: Negative. HENT: Negative. Eyes: Negative. Respiratory: Negative. Cardiovascular: Negative. Gastrointestinal: Negative. Musculoskeletal: Negative  Skin: scabbing wound, left leg   Neurological: reports decreased sensation in feet bilaterally   Psych: Negative.      Past Medical and Surgical History:     Past Medical History:   Diagnosis Date   • Arthritis    • Atrial fibrillation ([address])    • Carrero's esophagus     last assessed: 1/23/2018   • BRCA1 negative    • BRCA2 negative    • Breast cancer ([address]) 2006    stage 1 (left), given adjuvant radiation with Arimidex x 5 years   • Cancer ([address]) 2006    Left Breast, Lumpectomy   • Cataract     last assessed: 3/11/2014   • Chronic diastolic CHF (congestive heart failure) ([address]) 11/21/2022   • Dysplasia of toenail     last assessed: 8/29/2017   • Esophageal reflux    • GERD (gastroesophageal reflux disease)    • Gross hematuria     last assessed: 2/19/2015   • Hematuria    • Hiatal hernia    • History of radiation therapy    • Hypertension    • Irregular heart beat     AFIB   • Mixed sensory-motor polyneuropathy    • Neuropathy    • Obesity    • Pacemaker    • Paroxysmal atrial fibrillation [facility])    • Peripheral neuropathy    • Rectal bleeding    • Restless leg syndrome    • Shortness of breath     last assessed: 1/11/2016       Past Surgical History:   Procedure Laterality Date   • BREAST BIOPSY Left 2006   • BREAST LUMPECTOMY Left 2006    onset: 2006   • BREAST SURGERY     • CARDIAC PACEMAKER PLACEMENT      x 3 2006   • CATARACT EXTRACTION     • COLONOSCOPY     • CYSTOSCOPY  04/04/2014    diagnostic   • HYSTERECTOMY      ALISON BSO; due to fibroid uterus; age 38   • KNEE CARTILAGE SURGERY      excision lesion of meniscus or capsule knee   • KNEE SURGERY     • OOPHORECTOMY Bilateral     age 38   • OTHER SURGICAL HISTORY      radiation therapy   • NV ARTHRP KNE CONDYLE&PLATU MEDIAL&LAT COMPARTMENTS Left 08/17/2020    Procedure: ARTHROPLASTY KNEE TOTAL;  Surgeon: Stephanie Bhat DO;  Location: [facility];  Service: Orthopedics   • NV ARTHRP KNE CONDYLE&PLATU MEDIAL&LAT COMPARTMENTS Right 03/28/2022    Procedure: ARTHROPLASTY KNEE TOTAL W ROBOT - RIGHT;  Surgeon: Stephanie Bhat DO;  Location: [facility];  Service: Orthopedics   • NV COLONOSCOPY FLX DX W/COLLJ SPEC WHEN PFRMD N/A 02/08/2017    Procedure: COLONOSCOPY;  Surgeon: Moises Davenport MD;  Location:  GI LAB; Service: Gastroenterology   • NV ESOPHAGOGASTRODUODENOSCOPY TRANSORAL DIAGNOSTIC N/A 09/20/2017    Procedure: ESOPHAGOGASTRODUODENOSCOPY (EGD); Surgeon: Jaiden Bridges MD;  [facility]: [facility];   Service: Gastroenterology   • UPPER GASTROINTESTINAL ENDOSCOPY         Meds/Allergies:    Medications Prior to Admission   Medication   • acetaminophen (TYLENOL) 325 mg tablet   • Calcium Acetate, Phos Binder, (CALCIUM ACETATE PO)   • clobetasol (TEMOVATE) 0.05 % ointment   • famotidine (PEPCID) 40 MG tablet   • fluticasone (FLONASE) 50 mcg/act nasal spray   • gabapentin (NEURONTIN) 800 mg tablet   • loratadine (CLARITIN) 10 mg tablet   • magnesium 30 MG tablet   • metoprolol tartrate (LOPRESSOR) 50 mg tablet   • multivitamin (THERAGRAN) TABS   • pramipexole (MIRAPEX) 0.5 mg tablet   • rivaroxaban (Xarelto) 20 mg tablet   • torsemide (DEMADEX) 20 mg tablet       Allergies   Allergen Reactions   • Latex      Added based on information entered during case entry, please review and add reactions, type, and severity as needed   • Cephalexin Rash   • Duloxetine Hcl Other (See Comments)     Facial pins and needles sensation   • Erythromycin Rash   • Levofloxacin Other (See Comments)     Muscular aches   • Penicillins Rash   • Savella [Milnacipran] Rash   • Sulfa Antibiotics Rash       Social History:     Marital Status: /Civil Union    Substance Use History:   Social History     Substance and Sexual Activity   Alcohol Use Not Currently     Social History     Tobacco Use   Smoking Status Former   • Packs/day: 1.00   • Years: 25.00   • Total pack years: 25.00   • Types: Cigarettes   • Quit date: 1980   • Years since quittin.9   Smokeless Tobacco Never   Tobacco Comments    Quit over 30 years ago; quit age 41     Social History     Substance and Sexual Activity   Drug Use No       Family History:    Family History   Problem Relation Age of Onset   • Hypertension Mother    • Heart disease Father    • Aneurysm Father    • Coronary artery disease Father         in his 76s with aneurysm   • Aortic aneurysm Father         abdominal   • Scleroderma Sister    • Breast cancer Sister 76   • Hypertension Sister    • Cancer Sister    • No Known Problems Son    • No Known Problems Son    • Testicular cancer Son 39   • Thyroid cancer Son 45   • Colon cancer Maternal Aunt    • Colon cancer Maternal Aunt    • Breast cancer Other 48        kaylee's daughter   • Alcohol abuse Neg Hx    • Substance Abuse Neg Hx    • Mental illness Neg Hx    • Depression Neg Hx          OBJECTIVE:    Vitals:   Blood Pressure: 157/62 (23 1120)  Pulse: 61 (23 1120)  Temperature: (!) 97.1 °F (36.2 °C) (23)  Temp Source: Temporal (23)  Respirations: 18 (23)  Height: 5' 4.5" (163.8 cm) (23)  Weight - Scale: 104 kg (230 lb) (23)  SpO2: 95 % (23)    Physical Exam:    General Appearance: Alert, cooperative, no distress. HEENT: Head normocephalic, atraumatic, without obvious abnormality. Heart: Normal rate and rhythm. Lungs: Non-labored breathing.  No respiratory distress. Abdomen: Without distension. Psychiatric: AAOx3  Lower Extremity:  Vascular:   DP pulses: weak  PT pulses: nonpalpable  CRT < 3 seconds at the digits. +1/4 edema noted at bilateral lower extremities. Pedal hair is absent. Skin temperature is cool bilaterally. Musculoskeletal:  MMT is 5/5 in all muscle compartments bilaterally. Gross motor function to digits intact bilaterally. No Pain on palpation of left lower extremity bilaterally. No gross deformities noted. Dermatological:  Lower extremity wound(s) as noted below:    Wound #: 1  Location: left lateral leg  Length 5cm: Width 3cm: Depth: eschar  Deepest Tissue Noted in Base: eschar  Probe to Bone: No  Peripheral Skin Description: Attached  Granulation: 0% Fibrotic Tissue: 0% Necrotic Tissue: 100%   Drainage Amount: None  Signs of Infection: No      Neurological:  Gross sensation is intact. Protective sensation is diminished. Patient Reports numbness and/or paresthesias. Clinical Images 09/05/23:          Additional data:     Lab Results: I have personally reviewed pertinent labs including:        Results from last 7 days   Lab Units 08/30/23  0937   POTASSIUM mmol/L 5.7*   CHLORIDE mmol/L 104   CO2 mmol/L 30   BUN mg/dL 20   CREATININE mg/dL 0.81   CALCIUM mg/dL 9.7   ALK PHOS U/L 67   ALT U/L 13   AST U/L 19           Cultures: I have personally reviewed pertinent cultures including:              Imaging: I have personally reviewed pertinent reports in PACS. EKG, Pathology, and Other Studies: I have personally reviewed pertinent reports. ** Please Note: Portions of the record may have been created with voice recognition software. Occasional wrong word or "sound a like" substitutions may have occurred due to the inherent limitations of voice recognition software. Read the chart carefully and recognize, using context, where substitutions have occurred.  **

## 2023-09-05 NOTE — RESTORATIVE TECHNICIAN NOTE
Restorative Technician Note      Patient Name: Willard Barkley     Restorative Tech Visit Date: 09/05/23  Note Type: Mobility  Patient Position Upon Consult: Bedside chair  Activity Performed: Repositioned  Education Provided: Yes  Patient Position at End of Consult: Bedside chair;  All needs within reach    Reviewed home setup; pt with no questions or concerns at this time; PCA attempting blood work at this time; will follow up tomorrow     Jossie Ordaz  DPT, Restorative Technician

## 2023-09-05 NOTE — H&P
[address] [place] [address]  H&P  Name: Lissette Worley 82 y.o. female I MRN: [id number]  [facility] Date of Admission: 9/5/2023   Date of Service: 9/5/2023 I Hospital Day: 0      Assessment/Plan   * Non-healing wound of left lower extremity  Assessment & Plan  patient hit her LLE on the car door 2 weeks ago and developed a wound at that area. She was evaluated in ED on 8/26 and was suspected to have infected hematoma which was aspirated per ED note and was placed on clindamycin. · IV vancomycin. · Podiatry consult for evaluation of possible I&D/hematoma evacuation. · Local wound care. · Follow up blood cultures. Wound with no drainage and unable to obtain cultures. · XR tibia/fibula      Aortic stenosis, severe  Assessment & Plan  · Outpatient workup ongoing for TAVR. Stage 3a chronic kidney disease (HCC)  Assessment & Plan  · At baseline,  · Monitor BMP    RLS (restless legs syndrome)  Assessment & Plan  · Continue Mirapex. Diabetic polyneuropathy associated with type 2 diabetes mellitus ([address])  Assessment & Plan  · Diet controlled. · SSI  · Continue gabapentin. Atrial fibrillation ([address]) [I48.91]  Assessment & Plan  · Rate controlled with lopressor 75mg bid  · Hold Xarelto for now awaiting podiatry evaluation. Last dose 2 days ago `       VTE Pharmacologic Prophylaxis: VTE Score: 4 Moderate Risk (Score 3-4) - Pharmacological DVT Prophylaxis Contraindicated. Sequential Compression Devices Ordered. Code Status: Level 1 - Full Code   Discussion with family: Patient declined call to . Anticipated Length of Stay: Patient will be admitted on an inpatient basis with an anticipated length of stay of greater than 2 midnights secondary to above.     Total Time Spent on Date of Encounter in care of patient: 75 minutes This time was spent on one or more of the following: performing physical exam; counseling and coordination of care; obtaining or reviewing history; documenting in the medical record; reviewing/ordering tests, medications or procedures; communicating with other healthcare professionals and discussing with patient's family/caregivers. Chief Complaint: non healing wound    History of Present Illness:  Luke Feliz is a 82 y.o. female with a PMH of severe AS, chronic A fib on xarelto , PAD, HTN and T2DM who underwent cardiac cath today as part of workup for TAVR then sent by cadiology directly for evaluation of non healing left lower extremity wound. 2 weeks ago, patient hit her LLE on the car door and developed a wound at that area. She was evaluated in ED on 8/26 and was suspected to have infected hematoma which was aspirated per ED note and was placed on clindamycin. Patient reports expansion of the wound in last few days with mild pain but denies drainage or fevers. Review of Systems:  Review of Systems   Constitutional: Negative for chills and fever. HENT: Negative for ear pain and sore throat. Eyes: Negative for pain and visual disturbance. Respiratory: Negative for cough and shortness of breath. Cardiovascular: Negative for chest pain and palpitations. Gastrointestinal: Negative for abdominal pain and vomiting. Genitourinary: Negative for dysuria and hematuria. Musculoskeletal: Negative for arthralgias and back pain. Skin: Positive for color change and wound. Negative for rash. Neurological: Negative for seizures and syncope. All other systems reviewed and are negative.       Past Medical and Surgical History:   Past Medical History:   Diagnosis Date   • Arthritis    • Atrial fibrillation ([address])    • Carrero's esophagus     last assessed: 1/23/2018   • BRCA1 negative    • BRCA2 negative    • Breast cancer ([address]) 2006    stage 1 (left), given adjuvant radiation with Arimidex x 5 years   • Cancer ([address]) 2006    Left Breast, Lumpectomy   • Cataract     last assessed: 3/11/2014   • Chronic diastolic CHF (congestive heart failure) ([facility]) 11/21/2022   • Dysplasia of toenail     last assessed: 8/29/2017   • Esophageal reflux    • GERD (gastroesophageal reflux disease)    • Gross hematuria     last assessed: 2/19/2015   • Hematuria    • Hiatal hernia    • History of radiation therapy    • Hypertension    • Irregular heart beat     AFIB   • Mixed sensory-motor polyneuropathy    • Neuropathy    • Obesity    • Pacemaker    • Paroxysmal atrial fibrillation [facility])    • Peripheral neuropathy    • Rectal bleeding    • Restless leg syndrome    • Shortness of breath     last assessed: 1/11/2016       Past Surgical History:   Procedure Laterality Date   • BREAST BIOPSY Left 2006   • BREAST LUMPECTOMY Left 2006    onset: 2006   • BREAST SURGERY     • CARDIAC PACEMAKER PLACEMENT      x 3 2006   • CATARACT EXTRACTION     • COLONOSCOPY     • CYSTOSCOPY  04/04/2014    diagnostic   • HYSTERECTOMY      ALISON BSO; due to fibroid uterus; age 38   • KNEE CARTILAGE SURGERY      excision lesion of meniscus or capsule knee   • KNEE SURGERY     • OOPHORECTOMY Bilateral     age 38   • OTHER SURGICAL HISTORY      radiation therapy   • FL ARTHRP KNE CONDYLE&PLATU MEDIAL&LAT COMPARTMENTS Left 08/17/2020    Procedure: ARTHROPLASTY KNEE TOTAL;  Surgeon: Cheryl Lopez DO;  [facility];  Service: Orthopedics   • FL ARTHRP KNE CONDYLE&PLATU MEDIAL&LAT COMPARTMENTS Right 03/28/2022    Procedure: ARTHROPLASTY KNEE TOTAL W ROBOT - RIGHT;  Surgeon: Cheryl Lopez DO;  Location: [facility];  Service: Orthopedics   • FL COLONOSCOPY FLX DX W/COLLJ SPEC WHEN PFRMD N/A 02/08/2017    Procedure: COLONOSCOPY;  Surgeon: Sunny Acuna MD;  [facility]; Service: Gastroenterology   • FL ESOPHAGOGASTRODUODENOSCOPY TRANSORAL DIAGNOSTIC N/A 09/20/2017    Procedure: ESOPHAGOGASTRODUODENOSCOPY (EGD); Surgeon: Ericka Head MD;  [facility];   Service: Gastroenterology   • UPPER GASTROINTESTINAL ENDOSCOPY         Meds/Allergies:  Prior to Admission medications Medication Sig Start Date End Date Taking? Authorizing Provider   acetaminophen (TYLENOL) 325 mg tablet Take 3 tablets (975 mg total) by mouth every 8 (eight) hours  Patient taking differently: Take 975 mg by mouth every 8 (eight) hours As Needed 3/29/22  Yes Bertha Sanders PA-C   Calcium Acetate, Phos Binder, (CALCIUM ACETATE PO) Take by mouth daily     Yes Historical Provider, MD   clobetasol (TEMOVATE) 0.05 % ointment Apply once weekly to the affected area 1/18/23  Yes General Merrill King PA-C   famotidine (PEPCID) 40 MG tablet TAKE 1 TABLET BY MOUTH EVERY DAY 6/14/23  Yes Rajeev Fink MD   fluticasone (FLONASE) 50 mcg/act nasal spray SPRAY 1 SPRAY INTO EACH NOSTRIL EVERY DAY 3/14/23  Yes Sai Avilez MD   gabapentin (NEURONTIN) 800 mg tablet TAKE 1 TABLET BY MOUTH FOUR TIMES A DAY 1/9/23  Yes Tamia Galan MD   loratadine (CLARITIN) 10 mg tablet Take 10 mg by mouth daily   Yes Historical Provider, MD   magnesium 30 MG tablet Take 30 mg by mouth in the morning   Yes Historical Provider, MD   metoprolol tartrate (LOPRESSOR) 50 mg tablet TAKE 1.5 TABLETS BY MOUTH 2 TIMES A DAY. 9/19/22  Yes Anabel Howell MD   multivitamin (THERAGRAN) TABS Take 1 tablet by mouth daily   Yes Historical Provider, MD   pramipexole (MIRAPEX) 0.5 mg tablet TAKE 2 FULL TABLETS TWICE A DAY AT 5:00 P. M. AND 10:00 P. M. 7/31/23  Yes Tamia Galan MD   rivaroxaban (Xarelto) 20 mg tablet Take 1 tablet (20 mg total) by mouth daily with breakfast 7/28/23  Yes Anabel Howell MD   torsemide [facility]) 20 mg tablet Take 1 tablet (20 mg total) by mouth daily 11/23/22  Yes Anabel Howell MD     I have reviewed home medications with patient personally. Allergies:    Allergies   Allergen Reactions   • Latex      Added based on information entered during case entry, please review and add reactions, type, and severity as needed   • Cephalexin Rash   • Duloxetine Hcl Other (See Comments)     Facial pins and needles sensation   • Erythromycin Rash   • Levofloxacin Other (See Comments)     Muscular aches   • Penicillins Rash   • Savella [Milnacipran] Rash   • Sulfa Antibiotics Rash       Social History:  Marital Status: /Civil Union   Occupation: n/a  Patient Pre-hospital Living Situation: Home  Patient Pre-hospital Level of Mobility: walks  Patient Pre-hospital Diet Restrictions: n/a  Substance Use History:   Social History     Substance and Sexual Activity   Alcohol Use Not Currently     Social History     Tobacco Use   Smoking Status Former   • Packs/day: 1.00   • Years: 25.00   • Total pack years: 25.00   • Types: Cigarettes   • Quit date: 1980   • Years since quittin.9   Smokeless Tobacco Never   Tobacco Comments    Quit over 30 years ago; quit age 41     Social History     Substance and Sexual Activity   Drug Use No       Family History:  Family History   Problem Relation Age of Onset   • Hypertension Mother    • Heart disease Father    • Aneurysm Father    • Coronary artery disease Father         in his 76s with aneurysm   • Aortic aneurysm Father         abdominal   • Scleroderma Sister    • Breast cancer Sister 76   • Hypertension Sister    • Cancer Sister    • No Known Problems Son    • No Known Problems Son    • Testicular cancer Son 39   • Thyroid cancer Son 45   • Colon cancer Maternal Aunt    • Colon cancer Maternal Aunt    • Breast cancer Other 48        kaylee's daughter   • Alcohol abuse Neg Hx    • Substance Abuse Neg Hx    • Mental illness Neg Hx    • Depression Neg Hx        Physical Exam:     Vitals:   Blood Pressure: 130/77 (23)  Pulse: 102 (23)  Temperature: 97.8 °F (36.6 °C) (23)  Temp Source: Temporal (23)  Respirations: 18 (23)  Height: 5' 4.5" (163.8 cm) (23)  Weight - Scale: 104 kg (230 lb) (23)  SpO2: 94 % (23)    Physical Exam  Vitals and nursing note reviewed.    Constitutional:       General: She is not in acute distress. Appearance: She is well-developed. HENT:      Head: Normocephalic and atraumatic. Eyes:      Extraocular Movements: Extraocular movements intact. Conjunctiva/sclera: Conjunctivae normal.      Pupils: Pupils are equal, round, and reactive to light. Cardiovascular:      Rate and Rhythm: Normal rate and regular rhythm. Heart sounds: No murmur heard. Pulmonary:      Effort: Pulmonary effort is normal. No respiratory distress. Breath sounds: Normal breath sounds. Abdominal:      Palpations: Abdomen is soft. Tenderness: There is no abdominal tenderness. Musculoskeletal:         General: No swelling. Cervical back: Normal range of motion and neck supple. Right lower leg: Edema (trace) present. Left lower leg: Edema present. Skin:     General: Skin is warm and dry. Capillary Refill: Capillary refill takes less than 2 seconds. Comments: Left lower extremity wound with eschar as below. Slight surrounding erythema but no tenderness or drainage. Neurological:      Mental Status: She is alert.    Psychiatric:         Mood and Affect: Mood normal.                  Additional Data:     Lab Results:  Results from last 7 days   Lab Units 09/05/23  1432   WBC Thousand/uL 6.58   HEMOGLOBIN g/dL 12.8   HEMATOCRIT % 39.2   PLATELETS Thousands/uL 227   NEUTROS PCT % 61   LYMPHS PCT % 30   MONOS PCT % 8   EOS PCT % 0     Results from last 7 days   Lab Units 09/05/23  1432   SODIUM mmol/L 139   POTASSIUM mmol/L 3.9   CHLORIDE mmol/L 101   CO2 mmol/L 33*   BUN mg/dL 27*   CREATININE mg/dL 0.94   ANION GAP mmol/L 5   CALCIUM mg/dL 9.1   ALBUMIN g/dL 4.0   TOTAL BILIRUBIN mg/dL 0.94   ALK PHOS U/L 68   ALT U/L 11   AST U/L 20   GLUCOSE RANDOM mg/dL 110                 Results from last 7 days   Lab Units 09/05/23  1432   PROCALCITONIN ng/ml <0.05       Lines/Drains:  Invasive Devices     Peripheral Intravenous Line  Duration           Peripheral IV 09/05/23 Distal;Right;Ventral (anterior) Forearm <1 day                    Imaging: No pertinent imaging reviewed. VAS lower limb arterial duplex, complete bilateral   Final Result by Israel Castle DO (09/05 2042)      XR tibia fibula 2 vw left    (Results Pending)       EKG and Other Studies Reviewed on Admission:   · EKG: No EKG obtained. ** Please Note: This note has been constructed using a voice recognition system.  **

## 2023-09-05 NOTE — CONSULTS
Podiatry - Consultation     Patient Information:   Trino Florence 82 y.o. female MRN: [id number]  Unit/Bed#: [facility] 212-56 Encounter: [id number]  PCP: Abdi Lima MD  Date of Admission:  9/5/2023  Date of Consultation: 09/05/23  Requesting Physician: Andrew Santana MD        ASSESSMENT:     Trino Florence is a 82 y.o. female with:     1. Eschar, left lateral leg  2. Type 2 diabetes with polyneuropathy  3. Venous insufficiency  4. Peripheral atherosclerosis  5. CHF  6. CKD stage III  7. Chronic edema     PLAN:     • Patient evaluated at bedside. Stable eschar wound located to left lateral leg, no acute clinical signs of infection, no deep probing. • Plan to continue local wound care to left lateral extremity wound and close monitoring of wound. • Follow-up LEADs. Pending results, may place vascular consult. • Follow up radiographs of left leg  • Local wound care consisting of betadine, DSD. Wound care instructions placed. • Elevation on green foam wedges or pillows when non-ambulatory  • Rest of care per primary team.  • Will discuss this plan with my attending and update as needed.     Weightbearing status: Weightbearing as tolerated     SUBJECTIVE:     History of Present Illness:     Trino Florence is a 82 y.o. female who is originally admitted 9/5/2023 due to . Patient has a past medical history of hypertension, multiple lung nodules, GERD, Carrero's esophagus, venous insufficiency, lumbar radiculopathy, type 2 diabetes with peripheral neuropathy, left breast cancer, peripheral arterial disease,'s CKD stage III,, chronic edema.     We are consulted for a nonhealing left lower extremity wound. The patient states that approximately 2 weeks she hit her left lower leg on the car door, and it resulted in a wound.  She the jason to the emergency room for treatment of the wound, and she states that they were able to "drain the wound."  Through chart review, determined that she had an aspiration of a left lower extremity hematoma on 8/26/2023. She followed up with her primary care doctor who advised her to not scrub the area and to keep it dry to allow a scab to form. She states that is does look like the wound has dried up, and she denies seeing/having any acute clinical signs of infection - no yellow drainage, no ascending redness, no fevers or chills.     Today she denies N/V/SOB/F/C/CP        Review of Systems:     Constitutional: Negative. HENT: Negative. Eyes: Negative. Respiratory: Negative. Cardiovascular: Negative. Gastrointestinal: Negative.     Musculoskeletal: Negative  Skin: scabbing wound, left leg   Neurological: reports decreased sensation in feet bilaterally   Psych: Negative.      Past Medical and Surgical History:      Medical History        Past Medical History:   Diagnosis Date   • Arthritis     • Atrial fibrillation [facility])     • Carrero's esophagus       last assessed: 1/23/2018   • BRCA1 negative     • BRCA2 negative     • Breast cancer ([address]) 2006     stage 1 (left), given adjuvant radiation with Arimidex x 5 years   • Cancer ([address]) 2006     Left Breast, Lumpectomy   • Cataract       last assessed: 3/11/2014   • Chronic diastolic CHF (congestive heart failure) ([address]) 11/21/2022   • Dysplasia of toenail       last assessed: 8/29/2017   • Esophageal reflux     • GERD (gastroesophageal reflux disease)     • Gross hematuria       last assessed: 2/19/2015   • Hematuria     • Hiatal hernia     • History of radiation therapy     • Hypertension     • Irregular heart beat       AFIB   • Mixed sensory-motor polyneuropathy     • Neuropathy     • Obesity     • Pacemaker     • Paroxysmal atrial fibrillation [facility])     • Peripheral neuropathy     • Rectal bleeding     • Restless leg syndrome     • Shortness of breath       last assessed: 1/11/2016            Surgical History         Past Surgical History:   Procedure Laterality Date   • BREAST BIOPSY Left 2006   • BREAST LUMPECTOMY Left 2006     onset: 2006   • BREAST SURGERY       • CARDIAC PACEMAKER PLACEMENT         x 3 2006   • CATARACT EXTRACTION       • COLONOSCOPY       • CYSTOSCOPY   04/04/2014     diagnostic   • HYSTERECTOMY         ALISON BSO; due to fibroid uterus; age 38   • KNEE CARTILAGE SURGERY         excision lesion of meniscus or capsule knee   • KNEE SURGERY       • OOPHORECTOMY Bilateral       age 38   • OTHER SURGICAL HISTORY         radiation therapy   • NH ARTHRP KNE CONDYLE&PLATU MEDIAL&LAT COMPARTMENTS Left 08/17/2020     Procedure: ARTHROPLASTY KNEE TOTAL;  Surgeon: Cheryl Lopez DO;  Location: [facility];  Service: Orthopedics   • NH ARTHRP KNE CONDYLE&PLATU MEDIAL&LAT COMPARTMENTS Right 03/28/2022     Procedure: ARTHROPLASTY KNEE TOTAL W ROBOT - RIGHT;  Surgeon: Cheryl Lopez DO;  Location: [facility];  Service: Orthopedics   • NH COLONOSCOPY FLX DX W/COLLJ SPEC WHEN PFRMD N/A 02/08/2017     Procedure: COLONOSCOPY;  Surgeon: Sunny Acuna MD;  Location:  GI LAB; Service: Gastroenterology   • NH ESOPHAGOGASTRODUODENOSCOPY TRANSORAL DIAGNOSTIC N/A 09/20/2017     Procedure: ESOPHAGOGASTRODUODENOSCOPY (EGD); Surgeon: Ericka Head MD;  Location: [facility];   Service: Gastroenterology   • UPPER GASTROINTESTINAL ENDOSCOPY                Meds/Allergies:     Prescriptions Prior to Admission       Medications Prior to Admission   Medication   • acetaminophen (TYLENOL) 325 mg tablet   • Calcium Acetate, Phos Binder, (CALCIUM ACETATE PO)   • clobetasol (TEMOVATE) 0.05 % ointment   • famotidine (PEPCID) 40 MG tablet   • fluticasone (FLONASE) 50 mcg/act nasal spray   • gabapentin (NEURONTIN) 800 mg tablet   • loratadine (CLARITIN) 10 mg tablet   • magnesium 30 MG tablet   • metoprolol tartrate (LOPRESSOR) 50 mg tablet   • multivitamin (THERAGRAN) TABS   • pramipexole (MIRAPEX) 0.5 mg tablet   • rivaroxaban (Xarelto) 20 mg tablet   • torsemide (DEMADEX) 20 mg tablet                  Allergies   Allergen Reactions   • Latex         Added based on information entered during case entry, please review and add reactions, type, and severity as needed   • Cephalexin Rash   • Duloxetine Hcl Other (See Comments)       Facial pins and needles sensation   • Erythromycin Rash   • Levofloxacin Other (See Comments)       Muscular aches   • Penicillins Rash   • Savella [Milnacipran] Rash   • Sulfa Antibiotics Rash         Social History:     Marital Status: /Civil Union     Substance Use History:   Social History          Substance and Sexual Activity   Alcohol Use Not Currently      Social History           Tobacco Use   Smoking Status Former   • Packs/day: 1.00   • Years: 25.00   • Total pack years: 25.00   • Types: Cigarettes   • Quit date: 1980   • Years since quittin.9   Smokeless Tobacco Never   Tobacco Comments     Quit over 30 years ago; quit age 41      Social History          Substance and Sexual Activity   Drug Use No         Family History:     Family History         Family History   Problem Relation Age of Onset   • Hypertension Mother     • Heart disease Father     • Aneurysm Father     • Coronary artery disease Father           in his 76s with aneurysm   • Aortic aneurysm Father           abdominal   • Scleroderma Sister     • Breast cancer Sister 76   • Hypertension Sister     • Cancer Sister     • No Known Problems Son     • No Known Problems Son     • Testicular cancer Son 39   • Thyroid cancer Son 45   • Colon cancer Maternal Aunt     • Colon cancer Maternal Aunt     • Breast cancer Other 48         kaylee's daughter   • Alcohol abuse Neg Hx     • Substance Abuse Neg Hx     • Mental illness Neg Hx     • Depression Neg Hx                 OBJECTIVE:     Vitals:   Blood Pressure: 157/62 (23 1120)  Pulse: 61 (23 1120)  Temperature: (!) 97.1 °F (36.2 °C) (23)  Temp Source: Temporal (23)  Respirations: 18 (23)  Height: 5' 4.5" (163.8 cm) (23)  Weight - Scale: 104 kg (230 lb) (09/05/23 0723)  SpO2: 95 % (09/05/23 0935)     Physical Exam:     General Appearance: Alert, cooperative, no distress. HEENT: Head normocephalic, atraumatic, without obvious abnormality. Heart: Normal rate and rhythm. Lungs: Non-labored breathing. No respiratory distress. Abdomen: Without distension. Psychiatric: AAOx3  Lower Extremity:  Vascular:   DP pulses: weak  PT pulses: nonpalpable  CRT < 3 seconds at the digits. +1/4 edema noted at bilateral lower extremities. Pedal hair is absent. Skin temperature is cool bilaterally.     Musculoskeletal:  MMT is 5/5 in all muscle compartments bilaterally. Gross motor function to digits intact bilaterally. No Pain on palpation of left lower extremity bilaterally. No gross deformities noted.      Dermatological:  Lower extremity wound(s) as noted below:     Wound #: 1  Location: left lateral leg  Length 5cm: Width 3cm: Depth: eschar  Deepest Tissue Noted in Base: eschar  Probe to Bone: No  Peripheral Skin Description: Attached  Granulation: 0% Fibrotic Tissue: 0% Necrotic Tissue: 100%   Drainage Amount: None  Signs of Infection: No      Bilateral local extremity venous varicosities noted, lower extremity erythema consistent with dependent rubor/trophic changes     Neurological:  Gross sensation is intact. Protective sensation is diminished. Patient Reports numbness and/or paresthesias.     Clinical Images 09/05/23:             Additional data:      Lab Results: I have personally reviewed pertinent labs including:              Results from last 7 days   Lab Units 08/30/23  0937   POTASSIUM mmol/L 5.7*   CHLORIDE mmol/L 104   CO2 mmol/L 30   BUN mg/dL 20   CREATININE mg/dL 0.81   CALCIUM mg/dL 9.7   ALK PHOS U/L 67   ALT U/L 13   AST U/L 19             Cultures: I have personally reviewed pertinent cultures including:                Imaging: I have personally reviewed pertinent reports in PACS.   EKG, Pathology, and Other Studies: I have personally reviewed pertinent reports.          ** Please Note: Portions of the record may have been created with voice recognition software. Occasional wrong word or "sound a like" substitutions may have occurred due to the inherent limitations of voice recognition software. Read the chart carefully and recognize, using context, where substitutions have occurred.  **

## 2023-09-05 NOTE — INTERVAL H&P NOTE
H&P reviewed. After examining the patient, I find no changed to the H&P since it had been written. /92   Pulse 84   Temp 98 °F (36.7 °C) (Temporal)   Resp 18   Ht 5' 4.5" (1.638 m)   Wt 104 kg (230 lb)   SpO2 95%   BMI 38.87 kg/m²     Patient here for pre-TAVR evaluation for severe aortic stenosis. Patient re-evaluated.  Accept as history and physical.    RICKY David/September 5, 2023/8:22 AM

## 2023-09-06 ENCOUNTER — APPOINTMENT (INPATIENT)
Dept: NON INVASIVE DIAGNOSTICS | Facility: HOSPITAL | Age: 83
End: 2023-09-06
Payer: MEDICARE

## 2023-09-06 LAB
ANION GAP SERPL CALCULATED.3IONS-SCNC: 8 MMOL/L
BUN SERPL-MCNC: 23 MG/DL (ref 5–25)
CALCIUM SERPL-MCNC: 8.8 MG/DL (ref 8.4–10.2)
CHLORIDE SERPL-SCNC: 106 MMOL/L (ref 96–108)
CO2 SERPL-SCNC: 24 MMOL/L (ref 21–32)
CREAT SERPL-MCNC: 0.8 MG/DL (ref 0.6–1.3)
ERYTHROCYTE [DISTWIDTH] IN BLOOD BY AUTOMATED COUNT: 14.5 % (ref 11.6–15.1)
GFR SERPL CREATININE-BSD FRML MDRD: 69 ML/MIN/1.73SQ M
GLUCOSE SERPL-MCNC: 106 MG/DL (ref 65–140)
GLUCOSE SERPL-MCNC: 76 MG/DL (ref 65–140)
GLUCOSE SERPL-MCNC: 86 MG/DL (ref 65–140)
GLUCOSE SERPL-MCNC: 87 MG/DL (ref 65–140)
GLUCOSE SERPL-MCNC: 91 MG/DL (ref 65–140)
GLUCOSE SERPL-MCNC: 98 MG/DL (ref 65–140)
HCT VFR BLD AUTO: 38.1 % (ref 34.8–46.1)
HGB BLD-MCNC: 12 G/DL (ref 11.5–15.4)
MCH RBC QN AUTO: 31.3 PG (ref 26.8–34.3)
MCHC RBC AUTO-ENTMCNC: 31.5 G/DL (ref 31.4–37.4)
MCV RBC AUTO: 99 FL (ref 82–98)
PLATELET # BLD AUTO: 200 THOUSANDS/UL (ref 149–390)
PMV BLD AUTO: 10.6 FL (ref 8.9–12.7)
POTASSIUM SERPL-SCNC: 4.2 MMOL/L (ref 3.5–5.3)
RBC # BLD AUTO: 3.84 MILLION/UL (ref 3.81–5.12)
SODIUM SERPL-SCNC: 138 MMOL/L (ref 135–147)
WBC # BLD AUTO: 6.43 THOUSAND/UL (ref 4.31–10.16)

## 2023-09-06 PROCEDURE — 0JBP0ZZ EXCISION OF LEFT LOWER LEG SUBCUTANEOUS TISSUE AND FASCIA, OPEN APPROACH: ICD-10-PCS | Performed by: PODIATRIST

## 2023-09-06 PROCEDURE — 93971 EXTREMITY STUDY: CPT

## 2023-09-06 PROCEDURE — 97605 NEG PRS WND THER DME<=50SQCM: CPT | Performed by: PODIATRIST

## 2023-09-06 PROCEDURE — 87075 CULTR BACTERIA EXCEPT BLOOD: CPT

## 2023-09-06 PROCEDURE — 87070 CULTURE OTHR SPECIMN AEROBIC: CPT

## 2023-09-06 PROCEDURE — 2W1RX6Z COMPRESSION OF LEFT LOWER LEG USING PRESSURE DRESSING: ICD-10-PCS | Performed by: PODIATRIST

## 2023-09-06 PROCEDURE — 99232 SBSQ HOSP IP/OBS MODERATE 35: CPT | Performed by: STUDENT IN AN ORGANIZED HEALTH CARE EDUCATION/TRAINING PROGRAM

## 2023-09-06 PROCEDURE — 99232 SBSQ HOSP IP/OBS MODERATE 35: CPT | Performed by: PODIATRIST

## 2023-09-06 PROCEDURE — 82948 REAGENT STRIP/BLOOD GLUCOSE: CPT

## 2023-09-06 PROCEDURE — 85027 COMPLETE CBC AUTOMATED: CPT | Performed by: INTERNAL MEDICINE

## 2023-09-06 PROCEDURE — 87186 SC STD MICRODIL/AGAR DIL: CPT

## 2023-09-06 PROCEDURE — 11043 DBRDMT MUSC&/FSCA 1ST 20/<: CPT | Performed by: PODIATRIST

## 2023-09-06 PROCEDURE — 87205 SMEAR GRAM STAIN: CPT

## 2023-09-06 PROCEDURE — 87077 CULTURE AEROBIC IDENTIFY: CPT

## 2023-09-06 PROCEDURE — 87147 CULTURE TYPE IMMUNOLOGIC: CPT

## 2023-09-06 PROCEDURE — 80048 BASIC METABOLIC PNL TOTAL CA: CPT | Performed by: INTERNAL MEDICINE

## 2023-09-06 PROCEDURE — 87081 CULTURE SCREEN ONLY: CPT | Performed by: INTERNAL MEDICINE

## 2023-09-06 RX ORDER — LIDOCAINE HYDROCHLORIDE 10 MG/ML
10 INJECTION, SOLUTION EPIDURAL; INFILTRATION; INTRACAUDAL; PERINEURAL ONCE
Status: COMPLETED | OUTPATIENT
Start: 2023-09-06 | End: 2023-09-06

## 2023-09-06 RX ORDER — PRAMIPEXOLE DIHYDROCHLORIDE 1 MG/1
1 TABLET ORAL 2 TIMES DAILY
Status: DISCONTINUED | OUTPATIENT
Start: 2023-09-06 | End: 2023-09-08 | Stop reason: HOSPADM

## 2023-09-06 RX ORDER — OXYCODONE HYDROCHLORIDE 5 MG/1
5 TABLET ORAL ONCE
Status: COMPLETED | OUTPATIENT
Start: 2023-09-06 | End: 2023-09-06

## 2023-09-06 RX ADMIN — PRAMIPEXOLE DIHYDROCHLORIDE 1 MG: 1 TABLET ORAL at 17:27

## 2023-09-06 RX ADMIN — VANCOMYCIN HYDROCHLORIDE 1500 MG: 10 INJECTION, POWDER, LYOPHILIZED, FOR SOLUTION INTRAVENOUS at 21:29

## 2023-09-06 RX ADMIN — OXYCODONE HYDROCHLORIDE 5 MG: 5 TABLET ORAL at 21:27

## 2023-09-06 RX ADMIN — B-COMPLEX W/ C & FOLIC ACID TAB 1 TABLET: TAB at 07:58

## 2023-09-06 RX ADMIN — GABAPENTIN 800 MG: 400 CAPSULE ORAL at 12:19

## 2023-09-06 RX ADMIN — GABAPENTIN 800 MG: 400 CAPSULE ORAL at 17:27

## 2023-09-06 RX ADMIN — RIVAROXABAN 20 MG: 20 TABLET, FILM COATED ORAL at 17:27

## 2023-09-06 RX ADMIN — FAMOTIDINE 40 MG: 20 TABLET, FILM COATED ORAL at 21:17

## 2023-09-06 RX ADMIN — LIDOCAINE HYDROCHLORIDE 10 ML: 10 INJECTION, SOLUTION EPIDURAL; INFILTRATION; INTRACAUDAL; PERINEURAL at 14:05

## 2023-09-06 RX ADMIN — PRAMIPEXOLE DIHYDROCHLORIDE 1 MG: 1 TABLET ORAL at 21:16

## 2023-09-06 RX ADMIN — GABAPENTIN 800 MG: 400 CAPSULE ORAL at 07:58

## 2023-09-06 RX ADMIN — LORATADINE 10 MG: 10 TABLET ORAL at 07:58

## 2023-09-06 RX ADMIN — FLUTICASONE PROPIONATE 1 SPRAY: 50 SPRAY, METERED NASAL at 08:00

## 2023-09-06 RX ADMIN — METOPROLOL TARTRATE 75 MG: 50 TABLET, FILM COATED ORAL at 07:58

## 2023-09-06 RX ADMIN — GABAPENTIN 800 MG: 400 CAPSULE ORAL at 21:17

## 2023-09-06 RX ADMIN — TORSEMIDE 20 MG: 20 TABLET ORAL at 08:00

## 2023-09-06 NOTE — PLAN OF CARE
Problem: PAIN - ADULT  Goal: Verbalizes/displays adequate comfort level or baseline comfort level  Description: Interventions:  - Encourage patient to monitor pain and request assistance  - Assess pain using appropriate pain scale  - Administer analgesics based on type and severity of pain and evaluate response  - Implement non-pharmacological measures as appropriate and evaluate response  - Consider cultural and social influences on pain and pain management  - Notify physician/advanced practitioner if interventions unsuccessful or patient reports new pain  Outcome: Progressing     Problem: INFECTION - ADULT  Goal: Absence or prevention of progression during hospitalization  Description: INTERVENTIONS:  - Assess and monitor for signs and symptoms of infection  - Monitor lab/diagnostic results  - Monitor all insertion sites, i.e. indwelling lines, tubes, and drains  - Monitor endotracheal if appropriate and nasal secretions for changes in amount and color  - [place] appropriate cooling/warming therapies per order  - Administer medications as ordered  - Instruct and encourage patient and family to use good hand hygiene technique  - Identify and instruct in appropriate isolation precautions for identified infection/condition  Outcome: Progressing  Goal: Absence of fever/infection during neutropenic period  Description: INTERVENTIONS:  - Monitor WBC    Outcome: Progressing     Problem: SAFETY ADULT  Goal: Patient will remain free of falls  Description: INTERVENTIONS:  - Educate patient/family on patient safety including physical limitations  - Instruct patient to call for assistance with activity   - Consult OT/PT to assist with strengthening/mobility   - Keep Call bell within reach  - Keep bed low and locked with side rails adjusted as appropriate  - Keep care items and personal belongings within reach  - Initiate and maintain comfort rounds  - Make Fall Risk Sign visible to staff  - Offer Toileting every 2 Hours, in advance of need  - Initiate/Maintain TABS  - Obtain necessary fall risk management equipment: non-skid socks  - Apply yellow socks and bracelet for high fall risk patients  - Consider moving patient to room near nurses station  Outcome: Progressing  Goal: Maintain or return to baseline ADL function  Description: INTERVENTIONS:  -  Assess patient's ability to carry out ADLs; assess patient's baseline for ADL function and identify physical deficits which impact ability to perform ADLs (bathing, care of mouth/teeth, toileting, grooming, dressing, etc.)  - Assess/evaluate cause of self-care deficits   - Assess range of motion  - Assess patient's mobility; develop plan if impaired  - Assess patient's need for assistive devices and provide as appropriate  - Encourage maximum independence but intervene and supervise when necessary  - Involve family in performance of ADLs  - Assess for home care needs following discharge   - Consider OT consult to assist with ADL evaluation and planning for discharge  - Provide patient education as appropriate  Outcome: Progressing  Goal: Maintains/Returns to pre admission functional level  Description: INTERVENTIONS:  - Perform BMAT or MOVE assessment daily.   - Set and communicate daily mobility goal to care team and patient/family/caregiver. - Collaborate with rehabilitation services on mobility goals if consulted  - Perform Range of Motion 3 times a day. - Reposition patient every 2 hours.   - Dangle patient 3 times a day  - Stand patient 3 times a day  - Ambulate patient 3 times a day  - Out of bed to chair 3 times a day   - Out of bed for meals 3 times a day  - Out of bed for toileting  - Record patient progress and toleration of activity level   Outcome: Progressing

## 2023-09-06 NOTE — PROGRESS NOTES
[address] [facility]  Progress Note  Name: Pipo Reilly  MRN: [id number]  [facility] Date of Admission: 9/5/2023   Date of Service: 9/6/2023 I Hospital Day: 1    Assessment/Plan   Aortic stenosis, severe  Assessment & Plan  · Outpatient workup ongoing for TAVR. Stage 3a chronic kidney disease (HCC)  Assessment & Plan  · At baseline,  · Monitor BMP    RLS (restless legs syndrome)  Assessment & Plan  · Continue Mirapex twice daily at 5 PM and 10 PM    Diabetic polyneuropathy associated with type 2 diabetes mellitus ([address])  Assessment & Plan  · Diet controlled. · SSI  · Continue gabapentin. Atrial fibrillation ([address]) [I48.91]  Assessment & Plan  · Rate controlled with lopressor 75mg bid  · Resume Xarelto after discussion with podiatry  · Monitor H&H closely    * Non-healing wound of left lower extremity  Assessment & Plan  Patient hit her LLE on the car door 2 weeks ago and developed a wound at that area. She was evaluated in ED on 8/26 and was suspected to have infected hematoma which was aspirated per ED note and was placed on clindamycin. Patient presents from catheterization for left lateral leg eschar. 9/6: S/p bedside I&D with placement of wound VAC with podiatry. Noted to have large hematoma with no purulence noted. Arterial duplex: No significant arterial disease  Venous duplex: Pending    · Continue IV vancomycin. · Local wound care. · Follow up blood cultures and wound cultures  · Plan for wound check on 9/8 by podiatry         VTE Pharmacologic Prophylaxis: VTE Score: 4 Moderate Risk (Score 3-4) - Pharmacological DVT Prophylaxis Ordered: rivaroxaban (Xarelto). Patient Centered Rounds: I performed bedside rounds with nursing staff today. Discussions with Specialists or Other Care Team Provider: podiatry    Education and Discussions with Family / Patient: Updated  (son) via phone.     Total Time Spent on Date of Encounter in care of patient: 35 minutes This time was spent on one or more of the following: performing physical exam; counseling and coordination of care; obtaining or reviewing history; documenting in the medical record; reviewing/ordering tests, medications or procedures; communicating with other healthcare professionals and discussing with patient's family/caregivers. Current Length of Stay: 1 day(s)  Current Patient Status: Inpatient   Certification Statement: The patient will continue to require additional inpatient hospital stay due to IV antibiotics  Discharge Plan: Anticipate discharge in >72 hrs to discharge location to be determined pending rehab evaluations. Code Status: Level 1 - Full Code    Subjective:   Patient assessed at bedside. Offers no complaints. Objective:     Vitals:   Temp (24hrs), Av.6 °F (36.4 °C), Min:97.4 °F (36.3 °C), Max:97.8 °F (36.6 °C)    Temp:  [97.4 °F (36.3 °C)-97.8 °F (36.6 °C)] 97.4 °F (36.3 °C)  HR:  [] 81  Resp:  [18] 18  BP: (120-139)/(74-88) 139/88  SpO2:  [93 %-96 %] 96 %  Body mass index is 38.87 kg/m². Input and Output Summary (last 24 hours): Intake/Output Summary (Last 24 hours) at 2023 1607  Last data filed at 2023 1501  Gross per 24 hour   Intake 360 ml   Output 2200 ml   Net -1840 ml       Physical Exam:   Physical Exam  Vitals reviewed. Constitutional:       General: She is not in acute distress. Appearance: Normal appearance. She is not ill-appearing. HENT:      Head: Normocephalic and atraumatic. Eyes:      General: No scleral icterus. Extraocular Movements: Extraocular movements intact. Conjunctiva/sclera: Conjunctivae normal.   Cardiovascular:      Rate and Rhythm: Normal rate. Rhythm irregular. Heart sounds: Murmur (systolic) heard. Pulmonary:      Effort: Pulmonary effort is normal. No respiratory distress. Abdominal:      General: Bowel sounds are normal.      Palpations: Abdomen is soft. Tenderness:  There is no abdominal tenderness. Musculoskeletal:      Right lower leg: No edema. Left lower leg: No edema. Skin:     General: Skin is warm and dry. Comments: LLE wound vac   Neurological:      Mental Status: She is alert and oriented to person, place, and time. Mental status is at baseline. Psychiatric:         Mood and Affect: Mood normal.         Behavior: Behavior normal.        Additional Data:     Labs:  Results from last 7 days   Lab Units 09/06/23  0530 09/05/23  1432   WBC Thousand/uL 6.43 6.58   HEMOGLOBIN g/dL 12.0 12.8   HEMATOCRIT % 38.1 39.2   PLATELETS Thousands/uL 200 227   NEUTROS PCT %  --  61   LYMPHS PCT %  --  30   MONOS PCT %  --  8   EOS PCT %  --  0     Results from last 7 days   Lab Units 09/06/23  0530 09/05/23  1432   SODIUM mmol/L 138 139   POTASSIUM mmol/L 4.2 3.9   CHLORIDE mmol/L 106 101   CO2 mmol/L 24 33*   BUN mg/dL 23 27*   CREATININE mg/dL 0.80 0.94   ANION GAP mmol/L 8 5   CALCIUM mg/dL 8.8 9.1   ALBUMIN g/dL  --  4.0   TOTAL BILIRUBIN mg/dL  --  0.94   ALK PHOS U/L  --  68   ALT U/L  --  11   AST U/L  --  20   GLUCOSE RANDOM mg/dL 86 110         Results from last 7 days   Lab Units 09/06/23  1113 09/06/23  0654 09/05/23  2202   POC GLUCOSE mg/dl 76 91 106         Results from last 7 days   Lab Units 09/05/23  1432   PROCALCITONIN ng/ml <0.05       Lines/Drains:  Invasive Devices     Peripheral Intravenous Line  Duration           Peripheral IV 09/05/23 Distal;Right;Ventral (anterior) Forearm 1 day                      Imaging: Reviewed radiology reports from this admission including: ultrasound(s)    Recent Cultures (last 7 days):   Results from last 7 days   Lab Units 09/05/23  1433   BLOOD CULTURE  Received in Microbiology Lab. Culture in Progress. Received in Microbiology Lab. Culture in Progress.        Last 24 Hours Medication List:   Current Facility-Administered Medications   Medication Dose Route Frequency Provider Last Rate   • acetaminophen  650 mg Oral Q6H PRN Eliseo Ybarra MD     • famotidine  40 mg Oral HS Henri Javier MD     • fluticasone  1 spray Each Nare Daily Henri Javier MD     • gabapentin  800 mg Oral 4x Daily Henri Javier MD     • insulin lispro  1-5 Units Subcutaneous TID AC Henri Javier MD     • insulin lispro  1-5 Units Subcutaneous HS Henri Javier MD     • loratadine  10 mg Oral Daily Henri Javier MD     • metoprolol tartrate  75 mg Oral Q12H [address] Henri Javier MD     • multivitamin stress formula  1 tablet Oral Daily Henri Javier MD     • nitroglycerin  0.4 mg Sublingual Q5 Min PRN RICKY Carrasco     • ondansetron  4 mg Intravenous Q6H PRN RICKY Carrasco     • pramipexole  1 mg Oral BID Kierra Coffey DO     • rivaroxaban  20 mg Oral Daily With Breakfast Kierra Coffey DO     • torsemide  20 mg Oral Daily Henri Javier MD     • vancomycin  15 mg/kg Intravenous Q24H Henri Javier MD 1,500 mg (09/05/23 5491)        Today, Patient Was Seen By: Keith Freed DO    **Please Note: This note may have been constructed using a voice recognition system. **

## 2023-09-06 NOTE — ASSESSMENT & PLAN NOTE
patient hit her LLE on the car door 2 weeks ago and developed a wound at that area. She was evaluated in ED on 8/26 and was suspected to have infected hematoma which was aspirated per ED note and was placed on clindamycin. · IV vancomycin. · Podiatry consult for evaluation of possible I&D/hematoma evacuation. · Local wound care. · Follow up blood cultures. Wound with no drainage and unable to obtain cultures.   · XR tibia/fibula

## 2023-09-06 NOTE — PROGRESS NOTES
Dimitri Mock is a 82 y.o. female who is currently ordered Vancomycin IV with management by the Pharmacy Consult service. Relevant clinical data and objective / subjective history reviewed. Vancomycin Assessment:  Indication and Goal AUC/Trough: Soft tissue (goal -600, trough >10), -600, trough >10  Clinical Status: stable  Micro:     Renal Function:  SCr: 0.94 mg/dL  CrCl: 56.6 mL/min  Renal replacement: Not on dialysis  Days of Therapy: 1  Current Dose: 1500 mg IV q12h  Vancomycin Plan:  New Dosin mg IV q24h  Estimated AUC: 448 mcg*hr/mL  Estimated Trough: 13.3 mcg/mL  Next Level: 23 at 0600  Renal Function Monitoring: Daily BMP and [place] will continue to follow closely for s/sx of nephrotoxicity, infusion reactions and appropriateness of therapy. BMP and CBC will be ordered per protocol. We will continue to follow the patient’s culture results and clinical progress daily.     Jose Martin Balderas, Pharmacist

## 2023-09-06 NOTE — PLAN OF CARE
Problem: PAIN - ADULT  Goal: Verbalizes/displays adequate comfort level or baseline comfort level  Description: Interventions:  - Encourage patient to monitor pain and request assistance  - Assess pain using appropriate pain scale  - Administer analgesics based on type and severity of pain and evaluate response  - Implement non-pharmacological measures as appropriate and evaluate response  - Consider cultural and social influences on pain and pain management  - Notify physician/advanced practitioner if interventions unsuccessful or patient reports new pain  Outcome: Progressing     Problem: INFECTION - ADULT  Goal: Absence or prevention of progression during hospitalization  Description: INTERVENTIONS:  - Assess and monitor for signs and symptoms of infection  - Monitor lab/diagnostic results  - Monitor all insertion sites, i.e. indwelling lines, tubes, and drains  - Monitor endotracheal if appropriate and nasal secretions for changes in amount and color  - [place] appropriate cooling/warming therapies per order  - Administer medications as ordered  - Instruct and encourage patient and family to use good hand hygiene technique  - Identify and instruct in appropriate isolation precautions for identified infection/condition  Outcome: Progressing  Goal: Absence of fever/infection during neutropenic period  Description: INTERVENTIONS:  - Monitor WBC    Outcome: Progressing     Problem: SAFETY ADULT  Goal: Patient will remain free of falls  Description: INTERVENTIONS:  - Educate patient/family on patient safety including physical limitations  - Instruct patient to call for assistance with activity   - Consult OT/PT to assist with strengthening/mobility   - Keep Call bell within reach  - Keep bed low and locked with side rails adjusted as appropriate  - Keep care items and personal belongings within reach  - Initiate and maintain comfort rounds  - Make Fall Risk Sign visible to staff  - Apply yellow socks and bracelet for high fall risk patients  - Consider moving patient to room near nurses station  Outcome: Progressing  Goal: Maintain or return to baseline ADL function  Description: INTERVENTIONS:  -  Assess patient's ability to carry out ADLs; assess patient's baseline for ADL function and identify physical deficits which impact ability to perform ADLs (bathing, care of mouth/teeth, toileting, grooming, dressing, etc.)  - Assess/evaluate cause of self-care deficits   - Assess range of motion  - Assess patient's mobility; develop plan if impaired  - Assess patient's need for assistive devices and provide as appropriate  - Encourage maximum independence but intervene and supervise when necessary  - Involve family in performance of ADLs  - Assess for home care needs following discharge   - Consider OT consult to assist with ADL evaluation and planning for discharge  - Provide patient education as appropriate  Outcome: Progressing  Goal: Maintains/Returns to pre admission functional level  Description: INTERVENTIONS:  - Perform BMAT or MOVE assessment daily.   - Set and communicate daily mobility goal to care team and patient/family/caregiver.    - Collaborate with rehabilitation services on mobility goals if consulted  - Out of bed for toileting  - Record patient progress and toleration of activity level   Outcome: Progressing     Problem: DISCHARGE PLANNING  Goal: Discharge to home or other facility with appropriate resources  Description: INTERVENTIONS:  - Identify barriers to discharge w/patient and caregiver  - Arrange for needed discharge resources and transportation as appropriate  - Identify discharge learning needs (meds, wound care, etc.)  - Arrange for interpretive services to assist at discharge as needed  - Refer to Case Management Department for coordinating discharge planning if the patient needs post-hospital services based on physician/advanced practitioner order or complex needs related to functional status, cognitive ability, or social support system  Outcome: Progressing     Problem: Knowledge Deficit  Goal: Patient/family/caregiver demonstrates understanding of disease process, treatment plan, medications, and discharge instructions  Description: Complete learning assessment and assess knowledge base.   Interventions:  - Provide teaching at level of understanding  - Provide teaching via preferred learning methods  Outcome: Progressing

## 2023-09-06 NOTE — ASSESSMENT & PLAN NOTE
· Rate controlled with lopressor 75mg bid  · Resume Xarelto after discussion with podiatry  · Monitor H&H closely

## 2023-09-06 NOTE — CASE MANAGEMENT
Case Management Assessment & Discharge Planning Note    Patient name Karen Downing  Location [facility] 212-41 MRN [id number]  : 1940 Date 2023       Current Admission Date: 2023  Current Admission Diagnosis:Non-healing wound of left lower extremity   Patient Active Problem List    Diagnosis Date Noted   • Non-healing wound of left lower extremity 2023   • Aortic stenosis, severe 2023   • Chronic left shoulder pain 2023   • Chronic diastolic CHF (congestive heart failure) ([address]) 2022   • Chronic anticoagulation 2022   • Status post total knee replacement using cement, right 2022   • Stage 3a chronic kidney disease ([address]) 10/06/2021   • Exotropia of right eye 2021   • Seasonal allergies 2021   • Leg swelling 2020   • Status post total knee replacement using cement, left    • Lichen sclerosus et atrophicus of the vulva 2020   • Colon polyps 2019   • Chronic edema 2019   • Deep venous insufficiency 2019   • Pacemaker    • GERD (gastroesophageal reflux disease)    • Mild cognitive impairment 2018   • Vitamin D deficiency 2018   • Carrero's esophagus with dysplasia 2018   • Sensory polyneuropathy 2018   • RLS (restless legs syndrome) 2018   • Acquired deformity of foot 2018   • Corns 2018   • Diabetic polyneuropathy associated with type 2 diabetes mellitus ([address]) 2018   • 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[address]) 2018   • Radiculopathy of lumbar region 2018   • Atrial fibrillation ([address]) [I48.91] 2018   • Dense breast tissue on mammogram 2017   • Difficulty walking 2017   • Flat foot 2017   • Onychomycosis 2017   • Hiatal hernia    • Multiple lung nodules 2015   • Severe obesity (BMI 35.0-39. 9) with comorbidity ([address]) 2014   • Osteopenia of multiple sites 2014   • Arthritis 2014   • Essential hypertension 77/99/1953   • Lichen sclerosus et atrophicus 05/08/2013   • Peripheral neuropathy 05/08/2013      LOS (days): 1  Geometric Mean LOS (GMLOS) (days): 2.10  Days to GMLOS:0.9     OBJECTIVE:    Risk of Unplanned Readmission Score: 12.53         Current admission status: Inpatient       Preferred Pharmacy:   [facility], [address] [place] [address] [address]vd[address]  [address]  [address] [address]  Phone: [phone] Fax: [phone]    Primary Care Provider: Ree Hernandez MD    Primary Insurance: MEDICARE  Secondary Insurance: COMMERCIAL MISCELLANEOUS    ASSESSMENT:  [facility], [address] - Spouse   Primary Phone: [phone] (Mobile)  Home Phone: [phone]               Advance Directives  Does patient have a [address]?: Yes  Does patient have Advance Directives?: Yes  Advance Directives: Living will, Power of  for health care  Primary Contact: currently lists  Nella Ledezma; is considering changing documentation to reflect son Niecy Hall as primary decision-maker         Readmission Root Cause  30 Day Readmission: No    Patient Information  Admitted from[de-identified] Home  Mental Status: Alert  During Assessment patient was accompanied by: Not accompanied during assessment  Assessment information provided by[de-identified] Patient  Primary Caregiver: Self  Support Systems: Spouse/significant other, Son  What city do you live in[place] [facility] entry access options.  Select all that apply.: Stairs  Number of steps to enter home.: 3  Type of Current Residence: [address] [place] home  Upon entering residence, is there a bedroom on the main floor (no further steps)?: No  A bedroom is located on the following floor levels of residence (select all that apply):: 2nd Floor  Upon entering residence, is there a bathroom on the main floor (no further steps)?: Yes (half-bath)  Number of steps to 2nd floor from main floor: One Flight  In the last 12 months, was there a time when you were not able to pay the mortgage or rent on time?: No  In the last 12 months, was there a time when you did not have a steady place to sleep or slept in a shelter (including now)?: No  Homeless/housing insecurity resource given?: N/A  Living Arrangements: Lives w/ Spouse/significant other  Is patient a ?: No    Activities of Daily Living Prior to Admission  Functional Status: Independent  Completes ADLs independently?: Yes  Ambulates independently?: Yes  Does patient use assisted devices?: Yes  Assisted Devices (DME) used: Straight Cane  Does patient currently own DME?: Yes  What DME does the patient currently own?: Straight Claudis Gates, Bedside Commode, Shower Chair  Does patient have a history of Outpatient Therapy (PT/OT)?: Yes  Does the patient have a history of Short-Term Rehab?: No  Does patient have a history of HHC?: No  Does patient currently have [facility]?: No         Patient Information Continued  Income Source: Pension/shelter  Does patient have prescription coverage?: Yes  Within the past 12 months, you worried that your food would run out before you got the money to buy more.: Never true  Within the past 12 months, the food you bought just didn't last and you didn't have money to get more.: Never true  Food insecurity resource given?: N/A  Does patient receive dialysis treatments?: No  Does patient have a history of substance abuse?: No  Does patient have a history of Mental Health Diagnosis?: No         Means of Transportation  Means of Transport to Appts[de-identified] Drives Self  In the past 12 months, has lack of transportation kept you from medical appointments or from getting medications?: No  In the past 12 months, has lack of transportation kept you from meetings, work, or from getting things needed for daily living?: No  Was application for public transport provided?: N/A        DISCHARGE DETAILS:    Discharge planning discussed with[de-identified] patient  Freedom of Choice: Yes     CM contacted family/caregiver?: No- see comments (patient alert & oriented)  Were Treatment Team discharge recommendations reviewed with patient/caregiver?: Yes  Did patient/caregiver verbalize understanding of patient care needs?: Yes       Contacts  Patient Contacts: Anders Joe, son  Relationship to Patient[de-identified] Family  Contact Method: Phone  Phone Number: [phone]  Reason/Outcome: Continuity of Care, Emergency Contact, Discharge Planning                        Treatment Team Recommendation: Other (TBD)  Discharge Destination Plan[de-identified] Other (TBD -- awaiting recommendations)  Transport at Discharge : Family                                      Additional Comments: Introduced self and role to patient at bedside. Patient independent at home, uses cane at times. Remote history of outpatient PT after knee surgery. CM will continue to follow for d/c needs and place referrals as appropriate.

## 2023-09-06 NOTE — ASSESSMENT & PLAN NOTE
· Rate controlled with lopressor 75mg bid  · Hold Xarelto for now awaiting podiatry evaluation.  Last dose 2 days ago `

## 2023-09-06 NOTE — PLAN OF CARE
Problem: PAIN - ADULT  Goal: Verbalizes/displays adequate comfort level or baseline comfort level  Description: Interventions:  - Encourage patient to monitor pain and request assistance  - Assess pain using appropriate pain scale  - Administer analgesics based on type and severity of pain and evaluate response  - Implement non-pharmacological measures as appropriate and evaluate response  - Consider cultural and social influences on pain and pain management  - Notify physician/advanced practitioner if interventions unsuccessful or patient reports new pain  Outcome: Progressing     Problem: INFECTION - ADULT  Goal: Absence or prevention of progression during hospitalization  Description: INTERVENTIONS:  - Assess and monitor for signs and symptoms of infection  - Monitor lab/diagnostic results  - Monitor all insertion sites, i.e. indwelling lines, tubes, and drains  - Monitor endotracheal if appropriate and nasal secretions for changes in amount and color  - [place] appropriate cooling/warming therapies per order  - Administer medications as ordered  - Instruct and encourage patient and family to use good hand hygiene technique  - Identify and instruct in appropriate isolation precautions for identified infection/condition  Outcome: Progressing  Goal: Absence of fever/infection during neutropenic period  Description: INTERVENTIONS:  - Monitor WBC    Outcome: Progressing     Problem: SAFETY ADULT  Goal: Patient will remain free of falls  Description: INTERVENTIONS:  - Educate patient/family on patient safety including physical limitations  - Instruct patient to call for assistance with activity   - Consult OT/PT to assist with strengthening/mobility   - Keep Call bell within reach  - Keep bed low and locked with side rails adjusted as appropriate  - Keep care items and personal belongings within reach  - Initiate and maintain comfort rounds  - Make Fall Risk Sign visible to staff  - Apply yellow socks and bracelet for high fall risk patients  - Consider moving patient to room near nurses station  Outcome: Progressing  Goal: Maintain or return to baseline ADL function  Description: INTERVENTIONS:  -  Assess patient's ability to carry out ADLs; assess patient's baseline for ADL function and identify physical deficits which impact ability to perform ADLs (bathing, care of mouth/teeth, toileting, grooming, dressing, etc.)  - Assess/evaluate cause of self-care deficits   - Assess range of motion  - Assess patient's mobility; develop plan if impaired  - Assess patient's need for assistive devices and provide as appropriate  - Encourage maximum independence but intervene and supervise when necessary  - Involve family in performance of ADLs  - Assess for home care needs following discharge   - Consider OT consult to assist with ADL evaluation and planning for discharge  - Provide patient education as appropriate  Outcome: Progressing  Goal: Maintains/Returns to pre admission functional level  Description: INTERVENTIONS:  - Perform BMAT or MOVE assessment daily.   - Set and communicate daily mobility goal to care team and patient/family/caregiver.    - Collaborate with rehabilitation services on mobility goals if consulted  Problem: DISCHARGE PLANNING  Goal: Discharge to home or other facility with appropriate resources  Description: INTERVENTIONS:  - Identify barriers to discharge w/patient and caregiver  - Arrange for needed discharge resources and transportation as appropriate  - Identify discharge learning needs (meds, wound care, etc.)  - Arrange for interpretive services to assist at discharge as needed  - Refer to Case Management Department for coordinating discharge planning if the patient needs post-hospital services based on physician/advanced practitioner order or complex needs related to functional status, cognitive ability, or social support system  Outcome: Progressing     - Out of bed for toileting  - Record patient progress and toleration of activity level   Outcome: Progressing

## 2023-09-06 NOTE — ASSESSMENT & PLAN NOTE
Patient hit her LLE on the car door 2 weeks ago and developed a wound at that area. She was evaluated in ED on 8/26 and was suspected to have infected hematoma which was aspirated per ED note and was placed on clindamycin. Patient presents from catheterization for left lateral leg eschar. 9/6: S/p bedside I&D with placement of wound VAC with podiatry. Noted to have large hematoma with no purulence noted. Arterial duplex: No significant arterial disease  Venous duplex: Pending    · Continue IV vancomycin. · Local wound care.   · Follow up blood cultures and wound cultures  · Plan for wound check on 9/8 by podiatry

## 2023-09-06 NOTE — PROGRESS NOTES
Ian Bowman is a 82 y.o. female who is currently ordered Vancomycin IV with management by the Pharmacy Consult service. Relevant clinical data and objective / subjective history reviewed. Vancomycin Assessment:  Indication and Goal AUC/Trough: Soft tissue (goal -600, trough >10), -600, trough >10  Clinical Status: stable  Micro:     Renal Function:  SCr: 0.94mg/dL  CrCl: 55.9 mL/min  Renal replacement: Not on dialysis  Days of Therapy: 2  Current Dose: 1500mg iv q24h  Vancomycin Plan:  New Dosing: continue current dose  Estimated AUC: 463 mcg*hr/mL  Estimated Trough: 13.7 mcg/mL  Next Level: 9/7/23 am labs  Renal Function Monitoring: Daily BMP and [place] will continue to follow closely for s/sx of nephrotoxicity, infusion reactions and appropriateness of therapy. BMP and CBC will be ordered per protocol. We will continue to follow the patient’s culture results and clinical progress daily.     Sukh Rojas, Pharmacist

## 2023-09-06 NOTE — PROGRESS NOTES
Podiatry - Progress Note  Patient: Dimitri Mock 82 y.o. female   MRN: [id number]  PCP: Marychuy Mariee MD  Unit/Bed#: [facility] 522-30 Encounter: [id number]  Date Of Visit: 09/06/23    ASSESSMENT:    Dimitri Mock is a 82 y.o. female with:    1. Eschar, left lateral leg  2. Type 2 diabetes with polyneuropathy  3. Venous insufficiency  4. Peripheral atherosclerosis  5. CHF  6. CKD stage III  7. Chronic edema       PLAN:    · Plan for wound VAC to left lower extremity wound. See template below  · Bedside I&D performed. See procedure note below. Large hematoma noted, will plan to follow-up bedside cultures to tailor antibiotic therapy. No purulence noted  · Leads personally reviewed: Patient within normal healing range  · Would recommend continuing antibiotics until hematoma cultures result  · Patient reports pain in the left calf. Will order venous duplex ultrasound to rule out DVT  · Elevation and offloading on green foam wedges or pillows when non-ambulatory. · Rest of care per primary team.    Debridement Procedure:    After  Verbal Consent was obtained and time out performed. Wound located left lateral leg was  surgically Surgical- fat / tendon / muscle (CPT: 02014) debrided using scapel. Pre-debridement wound measures 2 cm x 2 cm x 0.1 cm. Pain was controlled by Lack of sensation due to Neuropathy  and local anesthetic- 1% lidocaine plain. Post debridement measurement 3 cm x 3 cm x 0.8 cm for a total of 9 square centimeters, with wound appearing Fresh bleeding tissue, viable and granular. 75%  of wound debrided. Tissue debrided includes depth of Dermis, Fascia and Subcutaneous with devitalized tissue being debrided including Necrotic/eschar and hematoma (aproximatly 20cc). with a small amount of bleeding bleeding was noted during procedure. Hemostasis was achieved using pressure. Pain noted during procedure rated as 0. Pain noted after procedure rated as 0.  Procedure was Well tolerated by patient. WOUND VAC CHANGE      Date 2023  Time 1600        Wounds inspected and found to be  clean with healthy tissue + bleeding. Measures approximately 3x3x0.8cm     Replaced dressing with 2 # black sponge      Total: 2 black pieces of sponge      Connected to suction running low-continuously at 125mmHg without leaks      Next wound VAC change 2023           Weightbearing status: Weightbearing as tolerated    SUBJECTIVE:     The patient was seen, evaluated, and assessed at bedside today. The patient was awake, alert, and in no acute distress. No acute events overnight. The patient reports pain in the cath on today's examination. Patient denies N/V/F/chills/SOB/CP. OBJECTIVE:     Vitals:   /74   Pulse 77   Temp 97.5 °F (36.4 °C)   Resp 18   Ht 5' 4.5" (1.638 m)   Wt 104 kg (230 lb)   SpO2 93%   BMI 38.87 kg/m²     Temp (24hrs), Av.5 °F (36.4 °C), Min:97.3 °F (36.3 °C), Max:97.8 °F (36.6 °C)      Physical Exam:     Lungs: Non labored breathing  Abdomen: Soft, non-tender. Lower Extremity:  Cardiovascular status at baseline from admission. Neurological status at baseline from admission. Musculoskeletal status at baseline from admission. No calf tenderness noted. Wound #: 1  Location: left lateral leg   Length 3cm: Width 3cm: Depth 0.8cm:   Deepest Tissue Noted in Base: muscle  Probe to Bone: No  Peripheral Skin Description: Attached  Granulation: 100% Fibrotic Tissue: 0% Necrotic Tissue: 0%   Drainage Amount: 20cc of hematoma  Signs of Infection: No, although a large hematoma noted to he leg. 20cc drained. Will follow up on cultures results     Clinical Images 23:               Additional Data:     Labs:    Results from last 7 days   Lab Units 23  0530 23  1432   WBC Thousand/uL 6.43 6.58   HEMOGLOBIN g/dL 12.0 12.8   HEMATOCRIT % 38.1 39.2   PLATELETS Thousands/uL 200 227   NEUTROS PCT %  --  61   LYMPHS PCT %  --  30   MONOS PCT %  --  8   EOS PCT %  --  0 Results from last 7 days   Lab Units 09/06/23  0530 09/05/23  1432   POTASSIUM mmol/L 4.2 3.9   CHLORIDE mmol/L 106 101   CO2 mmol/L 24 33*   BUN mg/dL 23 27*   CREATININE mg/dL 0.80 0.94   CALCIUM mg/dL 8.8 9.1   ALK PHOS U/L  --  68   ALT U/L  --  11   AST U/L  --  20           * I Have Reviewed All Lab Data Listed Above. Recent Cultures (last 7 days):     Results from last 7 days   Lab Units 09/05/23  1433   BLOOD CULTURE  Received in Microbiology Lab. Culture in Progress. Received in Microbiology Lab. Culture in Progress. Imaging: I have personally reviewed pertinent films in PACS  EKG, Pathology, and Other Studies: I have personally reviewed pertinent reports. ** Please Note: Portions of the record may have been created with voice recognition software. Occasional wrong word or "sound a like" substitutions may have occurred due to the inherent limitations of voice recognition software. Read the chart carefully and recognize, using context, where substitutions have occurred.  **

## 2023-09-07 LAB
ANION GAP SERPL CALCULATED.3IONS-SCNC: 6 MMOL/L
BASOPHILS # BLD AUTO: 0.05 THOUSANDS/ÂΜL (ref 0–0.1)
BASOPHILS NFR BLD AUTO: 1 % (ref 0–1)
BUN SERPL-MCNC: 27 MG/DL (ref 5–25)
CALCIUM SERPL-MCNC: 9 MG/DL (ref 8.4–10.2)
CHLORIDE SERPL-SCNC: 100 MMOL/L (ref 96–108)
CO2 SERPL-SCNC: 32 MMOL/L (ref 21–32)
CREAT SERPL-MCNC: 1.1 MG/DL (ref 0.6–1.3)
EOSINOPHIL # BLD AUTO: 0 THOUSAND/ÂΜL (ref 0–0.61)
EOSINOPHIL NFR BLD AUTO: 0 % (ref 0–6)
ERYTHROCYTE [DISTWIDTH] IN BLOOD BY AUTOMATED COUNT: 14.4 % (ref 11.6–15.1)
GFR SERPL CREATININE-BSD FRML MDRD: 47 ML/MIN/1.73SQ M
GLUCOSE SERPL-MCNC: 105 MG/DL (ref 65–140)
GLUCOSE SERPL-MCNC: 117 MG/DL (ref 65–140)
GLUCOSE SERPL-MCNC: 144 MG/DL (ref 65–140)
GLUCOSE SERPL-MCNC: 145 MG/DL (ref 65–140)
GLUCOSE SERPL-MCNC: 98 MG/DL (ref 65–140)
HCT VFR BLD AUTO: 39.3 % (ref 34.8–46.1)
HGB BLD-MCNC: 12.3 G/DL (ref 11.5–15.4)
IMM GRANULOCYTES # BLD AUTO: 0.01 THOUSAND/UL (ref 0–0.2)
IMM GRANULOCYTES NFR BLD AUTO: 0 % (ref 0–2)
LYMPHOCYTES # BLD AUTO: 1.54 THOUSANDS/ÂΜL (ref 0.6–4.47)
LYMPHOCYTES NFR BLD AUTO: 22 % (ref 14–44)
MCH RBC QN AUTO: 30.7 PG (ref 26.8–34.3)
MCHC RBC AUTO-ENTMCNC: 31.3 G/DL (ref 31.4–37.4)
MCV RBC AUTO: 98 FL (ref 82–98)
MONOCYTES # BLD AUTO: 0.64 THOUSAND/ÂΜL (ref 0.17–1.22)
MONOCYTES NFR BLD AUTO: 9 % (ref 4–12)
NEUTROPHILS # BLD AUTO: 4.64 THOUSANDS/ÂΜL (ref 1.85–7.62)
NEUTS SEG NFR BLD AUTO: 68 % (ref 43–75)
NRBC BLD AUTO-RTO: 0 /100 WBCS
PLATELET # BLD AUTO: 209 THOUSANDS/UL (ref 149–390)
PMV BLD AUTO: 10.5 FL (ref 8.9–12.7)
POTASSIUM SERPL-SCNC: 3.9 MMOL/L (ref 3.5–5.3)
RBC # BLD AUTO: 4.01 MILLION/UL (ref 3.81–5.12)
SODIUM SERPL-SCNC: 138 MMOL/L (ref 135–147)
VANCOMYCIN TROUGH SERPL-MCNC: 20.7 UG/ML (ref 10–20)
WBC # BLD AUTO: 6.88 THOUSAND/UL (ref 4.31–10.16)

## 2023-09-07 PROCEDURE — 85025 COMPLETE CBC W/AUTO DIFF WBC: CPT | Performed by: STUDENT IN AN ORGANIZED HEALTH CARE EDUCATION/TRAINING PROGRAM

## 2023-09-07 PROCEDURE — 80202 ASSAY OF VANCOMYCIN: CPT | Performed by: INTERNAL MEDICINE

## 2023-09-07 PROCEDURE — 97163 PT EVAL HIGH COMPLEX 45 MIN: CPT

## 2023-09-07 PROCEDURE — 93971 EXTREMITY STUDY: CPT | Performed by: SURGERY

## 2023-09-07 PROCEDURE — 99232 SBSQ HOSP IP/OBS MODERATE 35: CPT | Performed by: STUDENT IN AN ORGANIZED HEALTH CARE EDUCATION/TRAINING PROGRAM

## 2023-09-07 PROCEDURE — 97166 OT EVAL MOD COMPLEX 45 MIN: CPT

## 2023-09-07 PROCEDURE — 2W0RX6Z CHANGE PRESSURE DRESSING ON LEFT LOWER LEG: ICD-10-PCS | Performed by: PODIATRIST

## 2023-09-07 PROCEDURE — 82948 REAGENT STRIP/BLOOD GLUCOSE: CPT

## 2023-09-07 PROCEDURE — 80048 BASIC METABOLIC PNL TOTAL CA: CPT | Performed by: INTERNAL MEDICINE

## 2023-09-07 RX ADMIN — FLUTICASONE PROPIONATE 1 SPRAY: 50 SPRAY, METERED NASAL at 08:20

## 2023-09-07 RX ADMIN — GABAPENTIN 800 MG: 400 CAPSULE ORAL at 11:51

## 2023-09-07 RX ADMIN — RIVAROXABAN 20 MG: 20 TABLET, FILM COATED ORAL at 08:19

## 2023-09-07 RX ADMIN — METOPROLOL TARTRATE 75 MG: 50 TABLET, FILM COATED ORAL at 08:19

## 2023-09-07 RX ADMIN — GABAPENTIN 800 MG: 400 CAPSULE ORAL at 22:30

## 2023-09-07 RX ADMIN — GABAPENTIN 800 MG: 400 CAPSULE ORAL at 17:11

## 2023-09-07 RX ADMIN — TORSEMIDE 20 MG: 20 TABLET ORAL at 08:19

## 2023-09-07 RX ADMIN — PRAMIPEXOLE DIHYDROCHLORIDE 1 MG: 1 TABLET ORAL at 22:30

## 2023-09-07 RX ADMIN — PRAMIPEXOLE DIHYDROCHLORIDE 1 MG: 1 TABLET ORAL at 17:11

## 2023-09-07 RX ADMIN — LORATADINE 10 MG: 10 TABLET ORAL at 08:19

## 2023-09-07 RX ADMIN — FAMOTIDINE 40 MG: 20 TABLET, FILM COATED ORAL at 22:29

## 2023-09-07 RX ADMIN — GABAPENTIN 800 MG: 400 CAPSULE ORAL at 08:19

## 2023-09-07 RX ADMIN — B-COMPLEX W/ C & FOLIC ACID TAB 1 TABLET: TAB at 08:19

## 2023-09-07 RX ADMIN — Medication 2.5 MG: at 20:20

## 2023-09-07 NOTE — CASE MANAGEMENT
[address] [facility] has received request for [employer] wound vac from Care Manager. Request submitted via [employer] website.    Pending order #: [id number]

## 2023-09-07 NOTE — PLAN OF CARE
Problem: PAIN - ADULT  Goal: Verbalizes/displays adequate comfort level or baseline comfort level  Description: Interventions:  - Encourage patient to monitor pain and request assistance  - Assess pain using appropriate pain scale  - Administer analgesics based on type and severity of pain and evaluate response  - Implement non-pharmacological measures as appropriate and evaluate response  - Consider cultural and social influences on pain and pain management  - Notify physician/advanced practitioner if interventions unsuccessful or patient reports new pain  Outcome: Progressing     Problem: INFECTION - ADULT  Goal: Absence or prevention of progression during hospitalization  Description: INTERVENTIONS:  - Assess and monitor for signs and symptoms of infection  - Monitor lab/diagnostic results  - Monitor all insertion sites, i.e. indwelling lines, tubes, and drains  - Monitor endotracheal if appropriate and nasal secretions for changes in amount and color  - [place] appropriate cooling/warming therapies per order  - Administer medications as ordered  - Instruct and encourage patient and family to use good hand hygiene technique  - Identify and instruct in appropriate isolation precautions for identified infection/condition  Outcome: Progressing  Goal: Absence of fever/infection during neutropenic period  Description: INTERVENTIONS:  - Monitor WBC    Outcome: Progressing     Problem: SAFETY ADULT  Goal: Patient will remain free of falls  Description: INTERVENTIONS:  - Educate patient/family on patient safety including physical limitations  - Instruct patient to call for assistance with activity   - Consult OT/PT to assist with strengthening/mobility   - Keep Call bell within reach  - Keep bed low and locked with side rails adjusted as appropriate  - Keep care items and personal belongings within reach  - Initiate and maintain comfort rounds  - Make Fall Risk Sign visible to staff  - Apply yellow socks and bracelet for high fall risk patients  - Consider moving patient to room near nurses station  Outcome: Progressing  Goal: Maintain or return to baseline ADL function  Description: INTERVENTIONS:  -  Assess patient's ability to carry out ADLs; assess patient's baseline for ADL function and identify physical deficits which impact ability to perform ADLs (bathing, care of mouth/teeth, toileting, grooming, dressing, etc.)  - Assess/evaluate cause of self-care deficits   - Assess range of motion  - Assess patient's mobility; develop plan if impaired  - Assess patient's need for assistive devices and provide as appropriate  - Encourage maximum independence but intervene and supervise when necessary  - Involve family in performance of ADLs  - Assess for home care needs following discharge   - Consider OT consult to assist with ADL evaluation and planning for discharge  - Provide patient education as appropriate  Outcome: Progressing  Goal: Maintains/Returns to pre admission functional level  Description: INTERVENTIONS:  - Perform BMAT or MOVE assessment daily.   - Set and communicate daily mobility goal to care team and patient/family/caregiver.    - Collaborate with rehabilitation services on mobility goals if consulted  - Out of bed for toileting  - Record patient progress and toleration of activity level   Outcome: Progressing     Problem: DISCHARGE PLANNING  Goal: Discharge to home or other facility with appropriate resources  Description: INTERVENTIONS:  - Identify barriers to discharge w/patient and caregiver  - Arrange for needed discharge resources and transportation as appropriate  - Identify discharge learning needs (meds, wound care, etc.)  - Arrange for interpretive services to assist at discharge as needed  - Refer to Case Management Department for coordinating discharge planning if the patient needs post-hospital services based on physician/advanced practitioner order or complex needs related to functional status, cognitive ability, or social support system  Outcome: Progressing

## 2023-09-07 NOTE — PHYSICAL THERAPY NOTE
Physical Therapy Evaluation     [facility]    Admitting Diagnosis  Nonrheumatic aortic valve stenosis [I35.0]    Problem List  Patient Active Problem List   Diagnosis    Hiatal hernia    Atrial fibrillation ([address]) [I48.91]    Acquired deformity of foot    [place]    Diabetic polyneuropathy associated with type 2 diabetes mellitus ([address])    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[address])    Radiculopathy of lumbar region    Sensory polyneuropathy    RLS (restless legs syndrome)    Arthritis    Essential hypertension    Lichen sclerosus et atrophicus    Multiple lung nodules    Severe obesity (BMI 35.0-39. 9) with comorbidity ([address])    Osteopenia of multiple sites    Peripheral neuropathy    Vitamin D deficiency    Dense breast tissue on mammogram    Difficulty walking    Flat foot    Onychomycosis    Carrero's esophagus with dysplasia    Mild cognitive impairment    Pacemaker    GERD (gastroesophageal reflux disease)    Chronic edema    Deep venous insufficiency    Colon polyps    Lichen sclerosus et atrophicus of the vulva    Status post total knee replacement using cement, left    Leg swelling    Seasonal allergies    Exotropia of right eye    Stage 3a chronic kidney disease (HCC)    Status post total knee replacement using cement, right    Chronic anticoagulation    Chronic diastolic CHF (congestive heart failure) ([address])    Aortic stenosis, severe    Chronic left shoulder pain    Non-healing wound of left lower extremity       Past Medical History  Past Medical History:   Diagnosis Date    Arthritis     Atrial fibrillation ([address])     Carrero's esophagus     last assessed: 1/23/2018    BRCA1 negative     BRCA2 negative     Breast cancer ([address]) 2006    stage 1 (left), given adjuvant radiation with Arimidex x 5 years    Cancer ([address]) 2006    Left Breast, Lumpectomy    Cataract     last assessed: 3/11/2014    Chronic diastolic CHF (congestive heart failure) ([address]) 11/21/2022    Dysplasia of toenail     last assessed: 8/29/2017    Esophageal reflux     GERD (gastroesophageal reflux disease)     Gross hematuria     last assessed: 2/19/2015    Hematuria     Hiatal hernia     History of radiation therapy     Hypertension     Irregular heart beat     AFIB    Mixed sensory-motor polyneuropathy     Neuropathy     Obesity     Pacemaker     Paroxysmal atrial fibrillation (HCC)     Peripheral neuropathy     Rectal bleeding     Restless leg syndrome     Shortness of breath     last assessed: 1/11/2016       Past Surgical History  Past Surgical History:   Procedure Laterality Date    BREAST BIOPSY Left 2006    BREAST LUMPECTOMY Left 2006    onset: 2006    BREAST SURGERY      CARDIAC CATHETERIZATION N/A 9/5/2023    Procedure: Cardiac RHC/LHC; Surgeon: Khalif Mercedes MD;  [facility]; Service: Cardiology    CARDIAC CATHETERIZATION N/A 9/5/2023    Procedure: Cardiac Coronary Angiogram;  Surgeon: Khalif Mercedes MD;  [facility]; Service: Cardiology    CARDIAC CATHETERIZATION  9/5/2023    Procedure: Cardiac catheterization;  Surgeon: Khalif Mercedes MD;  [facility];   Service: Cardiology    CARDIAC PACEMAKER PLACEMENT      x 3 2006    CATARACT EXTRACTION      COLONOSCOPY      CYSTOSCOPY  04/04/2014    diagnostic    HYSTERECTOMY      ALISON BSO; due to fibroid uterus; age 38    KNEE CARTILAGE SURGERY      excision lesion of meniscus or capsule knee    KNEE SURGERY      OOPHORECTOMY Bilateral     age 38    OTHER SURGICAL HISTORY      radiation therapy    MI ARTHRP KNE CONDYLE&PLATU MEDIAL&LAT COMPARTMENTS Left 08/17/2020    Procedure: ARTHROPLASTY KNEE TOTAL;  Surgeon: Tony Mendoza DO;  [facility]  Service: Orthopedics    MI ARTHRP KNE CONDYLE&PLATU MEDIAL&LAT COMPARTMENTS Right 03/28/2022    Procedure: ARTHROPLASTY KNEE TOTAL W ROBOT - RIGHT;  Surgeon: Tony Mendoza DO;  Location: [facility];  Service: Orthopedics    MI COLONOSCOPY FLX DX W/COLLJ SPEC WHEN PFRMD N/A 02/08/2017 Procedure: COLONOSCOPY;  Surgeon: Chaya Diaz MD;  Location:  GI LAB; Service: Gastroenterology    AK ESOPHAGOGASTRODUODENOSCOPY TRANSORAL DIAGNOSTIC N/A 09/20/2017    Procedure: ESOPHAGOGASTRODUODENOSCOPY (EGD); Surgeon: Faith Arteaga MD;  Location:  GI LAB; Service: Gastroenterology    UPPER GASTROINTESTINAL ENDOSCOPY            09/07/23 0847   PT Last Visit   PT Visit Date 09/07/23   Note Type   Note type Evaluation   Pain Assessment   Pain Assessment Tool 0-10   Pain Score 6   Pain Location/Orientation Orientation: Left;Orientation: Lower; Location: Leg   Patient's Stated Pain Goal No pain   Hospital Pain Intervention(s) Repositioned; Ambulation/increased activity   Restrictions/Precautions   Weight Bearing Precautions Per Order Yes   LLE Weight Bearing Per Order WBAT   Other Precautions WBS; Fall Risk;Pain  (wound vac LLE)   Home Living   Type of [address] [place] [address] Two level;Bed/bath upstairs;Stairs to enter with rails  (3STE)   Bathroom Shower/Tub Tub/shower unit   [employer] Raised   Bathroom Equipment Grab bars in shower; Shower chair   Bathroom Accessibility Accessible   Home Equipment Walker;Cane  (pt reports she only uses spc on uneven surfaces)   Prior Function   Level of [place] Independent with ADLs; Independent with functional mobility; Independent with IADLS   Lives With Spouse   Receives Help From Family   IADLs Independent with driving; Independent with meal prep; Independent with medication management   Falls in the last 6 months 0   Vocational Retired   General   Family/Caregiver Present No   Cognition   Overall Cognitive Status WFL   Arousal/Participation Alert   Attention Within functional limits   Orientation Level Oriented X4   Memory Within functional limits   Following Commands Follows all commands and directions without difficulty   Subjective   Subjective pt pleasant and cooperative throughout therapy session.  pt received supine in bed   RLE Assessment   RLE Assessment [facility] LLE Assessment   LLE Assessment WFL   Bed Mobility   Supine to Sit Unable to assess   Sit to Supine Unable to assess   Transfers   Sit to Stand 5  Supervision   Additional items Increased time required;Verbal cues   Stand to Sit 5  Supervision   Additional items Increased time required;Verbal cues   Toilet transfer 5  Supervision   Additional items Increased time required;Verbal cues   Additional Comments transfers w/o AD   Ambulation/Elevation   Gait pattern Improper Weight shift;Decreased foot clearance; Wide ELEAZAR; [place]; Inconsistent phuc; Short stride; Excessively slow   Gait Assistance 5  Supervision   Additional items Verbal cues   Assistive Device None   Distance 150'   Balance   Static Sitting Good   Dynamic Sitting Good   Static Standing Fair +   Dynamic Standing Fair   Ambulatory Fair -   Endurance Deficit   Endurance Deficit Yes   Endurance Deficit Description decreased exercise tolerance   Activity Tolerance   Activity Tolerance Patient limited by fatigue   Medical Staff Made Aware yadira Walton due to medical complexity and multiple comorbidities   Nurse Made Aware RN cleared and updated   Assessment   Prognosis Good   Problem List Decreased strength;Decreased range of motion;Decreased endurance; Impaired balance;Decreased mobility;Pain;Obesity   Assessment PT orders received and acknowledged. Patient was seen today for high complexity PT evaluation. High complexity evaluation due to Ongoing medical management for primary dx, Fall risk, Continuous pulse oximetry monitoring , s/p surgical intervention Patient is a 82 y.o. female  who was [facility] to [facility] on 9/5/2023  with Non-healing wound of left lower extremity . Pt presents to [facility] c/o chest discomfort and dyspnea on exertion . Patient performed functional mobility as described above. At baseline, pt resides with spouse in house and was independent prior to hospital admission.  Currently, upon initial examination, pt  is requiring  supervision for functional transfers and supervision for ambulation with no AD. Patient currently presents below baseline with limitations in gait, balance, and transfers. Patient will benefit from continued PT services while in hospital in order to address remaining limitations. The patients AM-PAC Basic Mobility Inpatient Short From Raw Score is 23 . A Raw score of 23  suggests that the patient may benefit from discharge to home . PT recommendation at this time is for home with OPPT services . Please also refer to the recommendation of the Physical Therapist for safe discharge planning. Goals   Patient Goals to go home   STG Expiration Date 09/21/23   Short Term Goal #1 atient will be able to perform bed mobility tasks with modified independence in order to improve overall functional mobility and assist in safe d/c. STG 2 Patient will sit EOB for at least 25 minutes at modified independence level in order to strengthen abdominal musculature and assist in future transfers and ambulation. STG 3 Patient will be able to perform functional transfer with modified independence in order to improve overall functional mobility and assist in safe discharge. STG 4 Patient will be able to ambulate at least 300 feet with least restrictive device with modified independence assist in order to improve overall functional mobility and assist in safe discharge. STG 5 Patient will improve sitting/standing static/dynamic balance 1/2 grade in order to improve functional mobility and assist in safe discharge. STG 6 Patient will improve LE strength by 1/2 grade in order to improve functional mobility and assist in safe discharge.  STG 7 Patient will be able to negotiate at least 3 stairs with least restrictive device with modified independence A in order to improve overall functional mobility and assist in safe discharge   PT Treatment Day 0   Plan   Treatment/Interventions ADL retraining;Functional transfer training;LE strengthening/ROM; Elevations; Therapeutic exercise; Endurance training;Bed mobility;Gait training;Spoke to nursing;OT   PT Frequency 1-2x/wk   Recommendation   PT Discharge Recommendation Home with outpatient rehabilitation   AM-PAC Basic Mobility Inpatient   Turning in Flat Bed Without Bedrails 4   Lying on Back to Sitting on Edge of Flat Bed Without Bedrails 4   Moving Bed to Chair 4   Standing Up From Chair Using Arms 4   Walk in Room 4   Climb 3-5 Stairs With Railing 3   Basic Mobility Inpatient Raw Score 23   Basic Mobility Standardized Score 50.88   Highest Level Of Mobility   JH-HLM Goal 7: Walk 25 feet or more   JH-HLM Achieved 7: Walk 25 feet or more   End of Consult   Patient Position at End of Consult Seated edge of bed; All needs within reach         Nolan Canas PT

## 2023-09-07 NOTE — OCCUPATIONAL THERAPY NOTE
Occupational Therapy Evaluation     Patient Name: Queenie Bernardo  QEOWI'F Date: 9/7/2023  Problem List  Principal Problem:    Non-healing wound of left lower extremity  Active Problems:    Atrial fibrillation ([address]) [I48.91]    Diabetic polyneuropathy associated with type 2 diabetes mellitus (HCC)    RLS (restless legs syndrome)    Stage 3a chronic kidney disease ([address])    Aortic stenosis, severe    Past Medical History  Past Medical History:   Diagnosis Date    Arthritis     Atrial fibrillation ([address])     Carrero's esophagus     last assessed: 1/23/2018    BRCA1 negative     BRCA2 negative     Breast cancer ([address]) 2006    stage 1 (left), given adjuvant radiation with Arimidex x 5 years    Cancer ([address]) 2006    Left Breast, Lumpectomy    Cataract     last assessed: 3/11/2014    Chronic diastolic CHF (congestive heart failure) ([address]) 11/21/2022    Dysplasia of toenail     last assessed: 8/29/2017    Esophageal reflux     GERD (gastroesophageal reflux disease)     Gross hematuria     last assessed: 2/19/2015    Hematuria     Hiatal hernia     History of radiation therapy     Hypertension     Irregular heart beat     AFIB    Mixed sensory-motor polyneuropathy     Neuropathy     Obesity     Pacemaker     Paroxysmal atrial fibrillation (HCC)     Peripheral neuropathy     Rectal bleeding     Restless leg syndrome     Shortness of breath     last assessed: 1/11/2016     Past Surgical History  Past Surgical History:   Procedure Laterality Date    BREAST BIOPSY Left 2006    BREAST LUMPECTOMY Left 2006    onset: 2006    BREAST SURGERY      CARDIAC CATHETERIZATION N/A 9/5/2023    Procedure: Cardiac RHC/LHC; Surgeon: Bishnu Camacho MD;  [facility]: [facility]; Service: Cardiology    CARDIAC CATHETERIZATION N/A 9/5/2023    Procedure: Cardiac Coronary Angiogram;  Surgeon: Bishnu Camacho MD;  [facility];   Service: Cardiology    CARDIAC CATHETERIZATION  9/5/2023    Procedure: Cardiac catheterization; Surgeon: Heydi Otero MD;  Location:  CARDIAC CATH LAB; Service: Cardiology    CARDIAC PACEMAKER PLACEMENT      x 3 2006    CATARACT EXTRACTION      COLONOSCOPY      CYSTOSCOPY  04/04/2014    diagnostic    HYSTERECTOMY      ALISON BSO; due to fibroid uterus; age 38    KNEE CARTILAGE SURGERY      excision lesion of meniscus or capsule knee    KNEE SURGERY      OOPHORECTOMY Bilateral     age 38    OTHER SURGICAL HISTORY      radiation therapy    MA ARTHRP KNE CONDYLE&PLATU MEDIAL&LAT COMPARTMENTS Left 08/17/2020    Procedure: ARTHROPLASTY KNEE TOTAL;  Surgeon: Santo Gibbs DO;  Location: [facility];  Service: Orthopedics    MA ARTHRP KNE CONDYLE&PLATU MEDIAL&LAT COMPARTMENTS Right 03/28/2022    Procedure: ARTHROPLASTY KNEE TOTAL W ROBOT - RIGHT;  Surgeon: Santo Gibbs DO;  Location: [facility];  Service: Orthopedics    MA COLONOSCOPY FLX DX W/COLLJ SPEC WHEN PFRMD N/A 02/08/2017    Procedure: COLONOSCOPY;  Surgeon: Britni Palomo MD;  Location:  GI LAB; Service: Gastroenterology    MA ESOPHAGOGASTRODUODENOSCOPY TRANSORAL DIAGNOSTIC N/A 09/20/2017    Procedure: ESOPHAGOGASTRODUODENOSCOPY (EGD); Surgeon: Rj Rob MD;  Location:  GI LAB; Service: Gastroenterology    UPPER GASTROINTESTINAL ENDOSCOPY          09/07/23 0849   OT Last Visit   OT Visit Date 09/07/23   Note Type   Note type Evaluation   Pain Assessment   Pain Assessment Tool 0-10   Pain Score 6   Pain Location/Orientation Orientation: Left; Location: Leg   Effect of Pain on Daily Activities limits activity tolerance and mobility   Patient's Stated Pain Goal No pain   Hospital Pain Intervention(s) Repositioned; Ambulation/increased activity; Emotional support   Restrictions/Precautions   Weight Bearing Precautions Per Order Yes   LLE Weight Bearing Per Order WBAT   Other Precautions WBS; Fall Risk;Pain  (wound vac L LE)   Home Living   Type of Home House   Home Layout Two level;Bed/bath upstairs  (3 BREEZY)   Bathroom Shower/Tub Tub/shower unit   Kirvin Toilet Raised   Bathroom Equipment Grab bars in shower; Shower chair   Home Equipment Walker;Cane  (Pt reports using the spc as needed in grass/uneven surfaces.)   Prior Function   Level of [place] Independent with ADLs; Independent with functional mobility; Independent with IADLS   Lives With Spouse   Receives Help From Family   IADLs Independent with driving; Independent with meal prep; Independent with medication management   Falls in the last 6 months 0   Vocational Retired   Lifestyle   Autonomy Pt reports I w/ ADLs, IADLs, and fxnl mobility. + driving   Reciprocal Relationships Pt lives w/ her  and has three sons. One of her sons lives next door to her. Service to Others Pt is retired. 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 Intrinsic Gratification Pt enjoys spending time w/ her friends. General   Family/Caregiver Present No   Subjective   Subjective "I've been walking to the bathroom by myself at night."   ADL   Eating Assistance [address] [phone]  Supervision/Setup   [address] [facility] 6  Modified independent   [address] 5  Supervision/Setup  (to don R and L sock)   LB Dressing Deficit Setup;Supervision/safety; Increased time to complete; Don/doff R sock; Don/doff L sock   Toileting Assistance  5  Supervision/Setup  (Pt voided on standard toilet. She was able to manage clothing and complete hygiene w/o assistance.)   Toileting Deficit Setup; Increased time to complete   Bed Mobility   Supine to Sit Unable to assess   Sit to Supine Unable to assess   Additional Comments Pt seated EOB upon therapist's arrival. Pt seated EOB at end of OT evaluation w/ needs met.    Transfers   Sit to Stand 5  Supervision   Additional items Increased time required   Stand to Sit 5  Supervision   Additional items Increased time required   Toilet transfer 5  Supervision   Additional items Increased time required;Standard toilet   Functional Mobility   Functional Mobility 5  Supervision   Additional Comments Pt ambulated household distance w/ S w/o AD. Pt also ambulated within her room w/ S and was able to manage wound vac w/o assist.   Additional items   (no AD)   Balance   Static Sitting Good   Dynamic Sitting Good   Static Standing Fair +   Dynamic Standing Fair   Ambulatory Fair -   Activity Tolerance   Activity Tolerance Patient tolerated treatment well   Medical Staff Made Aware PT, Jo Ann Gutierrez, due to pt's medical complexity, increased pain, and decreased activity tolerance   Nurse Made Aware RN clearance prior to session   RUE Assessment   RUE Assessment WFL   LUE Assessment   LUE Assessment WFL   Cognition   Overall Cognitive Status WFL   Arousal/Participation Alert; Responsive; Cooperative   Attention Attends with cues to redirect   Orientation Level Oriented X4   Memory Within functional limits   Following Commands Follows multistep commands without difficulty   Comments Pt was pleasant and cooperative. Pt was agreeable and willing to participate in OT evaluation. Assessment   Assessment Pt is a 82 y.o. female seen for OT evaluation s/p admission to [address] [place] [address] on 9/5/2023 due to nonhealing wound on L LE. Pt s/p I&D w/ placement of wound VAC on 9/6/2023. Pt has a significant PMH impacting occupational performance including: Arthritis, Atrial fibrillation, Carrero's esophagus, Breast CA, Cataract, Chronic diastolic CHF, Esophageal reflux, GERD, Hematuria, Hiatal hernia, Hypertension, Irregular heart beat, Mixed sensory-motor polyneuropathy, Neuropathy, Obesity, Pacemaker, Peripheral neuropathy, Rectal bleeding, Restless leg syndrome, type II DM, CKD, and aortic stenosis. Pt with active OT evaluation and treatment orders and activity orders. PTA, [facility]. Pt is motivated to return home. Pt agreeable and willing to participate in OT evaluation.  During evaluation, pt was I for eating and grooming, mod I for UB ADLs, and S for LB ADLs, toileting, transfers, and fxnl mobility w/o AD. Performance deficits that affect the pt’s occupational performance during the initial evaluation include pain, decreased activity tolerance, and decreased endurance. No further acute OT needs identified at this time. Recommend continued active ADL participation and mobilization with hospital staff while in the hospital to increase pt’s endurance and strength upon D/C. From OT standpoint, recommend D/C to home with family support when medically cleared. D/C pt from OT caseload at this time. Goals   Patient Goals to go home   Plan   OT Frequency Eval only   Recommendation   OT Discharge Recommendation No rehabilitation needs   AM-PAC Daily Activity Inpatient   Lower Body Dressing 3   Bathing 3   Toileting 3   Upper Body Dressing 4   Grooming 4   Eating 4   Daily Activity Raw Score 21   Daily Activity Standardized Score (Calc for Raw Score >=11) 44.27   AM-PAC Applied Cognition Inpatient   Following a Speech/Presentation 4   Understanding Ordinary Conversation 4   Taking Medications 4   Remembering Where Things Are Placed or Put Away 4   Remembering List of 4-5 Errands 4   Taking Care of Complicated Tasks 4   Applied Cognition Raw Score 24   Applied Cognition Standardized Score 62.21       The patient's raw score on the AM-PAC Daily Activity Inpatient Short Form is 21. A raw score of greater than or equal to 19 suggests the patient may benefit from discharge to home. Please refer to the recommendation of the Occupational Therapist for safe discharge planning.     Rehan Naylor, OTR/L

## 2023-09-07 NOTE — ASSESSMENT & PLAN NOTE
Patient hit her LLE on the car door 2 weeks ago and developed a wound at that area. She was evaluated in ED on 8/26 and was suspected to have infected hematoma which was aspirated per ED note and was placed on clindamycin. Patient presents from catheterization for left lateral leg eschar. 9/6: S/p bedside I&D with placement of wound VAC with podiatry. Noted to have large hematoma with no purulence noted. Arterial duplex: No significant arterial disease  Venous duplex: No signs of DVT    · Discontinue IV antibiotics given no signs of infection on wound culture  · Local wound care.   · Follow up blood cultures and wound cultures  · Plan for wound check on 9/8 by podiatry

## 2023-09-07 NOTE — PROGRESS NOTES
[address] [facility]  Progress Note  Name: Natalie Reese  MRN: [id number]  [facility] Date of Admission: 9/5/2023   Date of Service: 9/7/2023 I Hospital Day: 2    Assessment/Plan   * Non-healing wound of left lower extremity  Assessment & Plan  Patient hit her LLE on the car door 2 weeks ago and developed a wound at that area. She was evaluated in ED on 8/26 and was suspected to have infected hematoma which was aspirated per ED note and was placed on clindamycin. Patient presents from catheterization for left lateral leg eschar. 9/6: S/p bedside I&D with placement of wound VAC with podiatry. Noted to have large hematoma with no purulence noted. Arterial duplex: No significant arterial disease  Venous duplex: No signs of DVT    · Discontinue IV antibiotics given no signs of infection on wound culture  · Local wound care. · Follow up blood cultures and wound cultures  · Plan for wound check on 9/8 by podiatry    Aortic stenosis, severe  Assessment & Plan  · Outpatient workup ongoing for TAVR. Stage 3a chronic kidney disease (HCC)  Assessment & Plan  · At baseline,  · Monitor BMP    RLS (restless legs syndrome)  Assessment & Plan  · Continue Mirapex twice daily at 5 PM and 10 PM    Diabetic polyneuropathy associated with type 2 diabetes mellitus ([address])  Assessment & Plan  · Diet controlled. · SSI  · Continue gabapentin. Atrial fibrillation (HCC) [I48.91]  Assessment & Plan  · Rate controlled with lopressor 75mg bid  · Continue Xarelto  · Monitor H&H closely       VTE Pharmacologic Prophylaxis: VTE Score: 4 Moderate Risk (Score 3-4) - Pharmacological DVT Prophylaxis Ordered: rivaroxaban (Xarelto). Patient Centered Rounds: I performed bedside rounds with nursing staff today. Discussions with Specialists or Other Care Team Provider: primary RN    Education and Discussions with Family / Patient: Updated  (son) via phone.     Total Time Spent on Date of Encounter in care of patient: 35 minutes This time was spent on one or more of the following: performing physical exam; counseling and coordination of care; obtaining or reviewing history; documenting in the medical record; reviewing/ordering tests, medications or procedures; communicating with other healthcare professionals and discussing with patient's family/caregivers. Current Length of Stay: 2 day(s)  Current Patient Status: Inpatient   Certification Statement: The patient will continue to require additional inpatient hospital stay due to pending wound care manameent with podiatry  Discharge Plan: Anticipate discharge tomorrow to home with home services. Code Status: Level 1 - Full Code    Subjective:   Patient assessed at bedside. No acute complaints. Objective:     Vitals:   Temp (24hrs), Av.7 °F (36.5 °C), Min:97.3 °F (36.3 °C), Max:98.2 °F (36.8 °C)    Temp:  [97.3 °F (36.3 °C)-98.2 °F (36.8 °C)] 97.3 °F (36.3 °C)  HR:  [80-95] 85  Resp:  [16-22] 22  BP: (113-141)/(55-58) 141/58  SpO2:  [90 %-93 %] 93 %  Body mass index is 38.87 kg/m². Input and Output Summary (last 24 hours): Intake/Output Summary (Last 24 hours) at 2023 1828  Last data filed at 2023 1749  Gross per 24 hour   Intake 298 ml   Output 405 ml   Net -107 ml       Physical Exam:   Physical Exam  Vitals reviewed. Constitutional:       General: She is not in acute distress. Appearance: Normal appearance. She is not ill-appearing. HENT:      Head: Normocephalic and atraumatic. Eyes:      General: No scleral icterus. Conjunctiva/sclera: Conjunctivae normal.   Cardiovascular:      Rate and Rhythm: Normal rate. Rhythm irregular. Heart sounds: Murmur heard. Pulmonary:      Effort: Pulmonary effort is normal. No respiratory distress. Breath sounds: Normal breath sounds. Abdominal:      General: Bowel sounds are normal.      Palpations: Abdomen is soft. Tenderness:  There is no abdominal tenderness. Skin:     General: Skin is warm and dry. Comments: LLE wound vac in place   Neurological:      Mental Status: She is alert and oriented to person, place, and time. Mental status is at baseline. Psychiatric:         Mood and Affect: Mood normal.         Behavior: Behavior normal.          Additional Data:     Labs:  Results from last 7 days   Lab Units 09/07/23  0450   WBC Thousand/uL 6.88   HEMOGLOBIN g/dL 12.3   HEMATOCRIT % 39.3   PLATELETS Thousands/uL 209   NEUTROS PCT % 68   LYMPHS PCT % 22   MONOS PCT % 9   EOS PCT % 0     Results from last 7 days   Lab Units 09/07/23  0450 09/06/23  0530 09/05/23  1432   SODIUM mmol/L 138   < > 139   POTASSIUM mmol/L 3.9   < > 3.9   CHLORIDE mmol/L 100   < > 101   CO2 mmol/L 32   < > 33*   BUN mg/dL 27*   < > 27*   CREATININE mg/dL 1.10   < > 0.94   ANION GAP mmol/L 6   < > 5   CALCIUM mg/dL 9.0   < > 9.1   ALBUMIN g/dL  --   --  4.0   TOTAL BILIRUBIN mg/dL  --   --  0.94   ALK PHOS U/L  --   --  68   ALT U/L  --   --  11   AST U/L  --   --  20   GLUCOSE RANDOM mg/dL 105   < > 110    < > = values in this interval not displayed. Results from last 7 days   Lab Units 09/07/23  1558 09/07/23  1143 09/07/23  0736 09/06/23  2110 09/06/23  1642 09/06/23  1113 09/06/23  0654 09/05/23  2202   POC GLUCOSE mg/dl 117 145* 98 98 87 76 91 106         Results from last 7 days   Lab Units 09/05/23  1432   PROCALCITONIN ng/ml <0.05       Lines/Drains:  Invasive Devices     Peripheral Intravenous Line  Duration           Peripheral IV 09/05/23 Distal;Right;Ventral (anterior) Forearm 2 days                      Imaging: Reviewed radiology reports from this admission including: ultrasound(s)    Recent Cultures (last 7 days):   Results from last 7 days   Lab Units 09/06/23  1525 09/05/23  1433   BLOOD CULTURE   --  No Growth at 24 hrs. No Growth at 24 hrs.    GRAM STAIN RESULT  No Polys or Bacteria seen  --    WOUND CULTURE  No growth  --        Last 24 Hours Medication List:   Current Facility-Administered Medications   Medication Dose Route Frequency Provider Last Rate   • acetaminophen  650 mg Oral Q6H PRN Henri Javier MD     • famotidine  40 mg Oral HS Christy Garcia MD     • fluticasone  1 spray Each Nare Daily Henri Javier MD     • gabapentin  800 mg Oral 4x Daily Henri Javier MD     • insulin lispro  1-5 Units Subcutaneous TID AC Henri Javier MD     • insulin lispro  1-5 Units Subcutaneous HS Christy Garcia MD     • loratadine  10 mg Oral Daily Henri Javier MD     • metoprolol tartrate  75 mg Oral Q12H [facility] Henri Javier MD     • multivitamin stress formula  1 tablet Oral Daily Henri Javier MD     • nitroglycerin  0.4 mg Sublingual Q5 Min PRN RICKY Diaz     • ondansetron  4 mg Intravenous Q6H PRN RICKY Diaz     • oxyCODONE  2.5 mg Oral Q6H PRN Kierra Coffey DO     • pramipexole  1 mg Oral BID Kierra Coffey DO     • rivaroxaban  20 mg Oral Daily With Breakfast Kierra Coffey DO     • torsemide  20 mg Oral Daily Christy Garcia MD          Today, Patient Was Seen By: Espinoza Pablo DO    **Please Note: This note may have been constructed using a voice recognition system. **

## 2023-09-07 NOTE — PROGRESS NOTES
Paola Horne is a 82 y.o. female who is currently ordered Vancomycin IV with management by the Pharmacy Consult service. Relevant clinical data and objective / subjective history reviewed. Vancomycin Assessment:  Indication and Goal AUC/Trough: Soft tissue (goal -600, trough >10), -600, trough >10  Micro:   Cultures pending  Renal Function:  SCr: 1.1 mg/dL  CrCl: 48 mL/min  Renal replacement: Not on dialysis  Days of Therapy: 3  Current Dose: 1500mg iv q24h  Vancomycin Plan:  New Dosinmg iv daily prn if level is less than or = 15  Estimated AUC: N/A  Estimated Trough: N/A  Next Level: 23 @060  Renal Function Monitoring: Daily BMP and [place] will continue to follow closely for s/sx of nephrotoxicity, infusion reactions and appropriateness of therapy. BMP and CBC will be ordered per protocol. We will continue to follow the patient’s culture results and clinical progress daily.     Ally Macario, Pharmacist

## 2023-09-07 NOTE — QUICK NOTE
Floor nurse reported that patient's VAC was sounding alarm for tubing kinked. Responded to the alert, took down dressing and ensured adequate suction of the VAC with no leaks present.  Redressed patient, will change vac tomorrow 9/8

## 2023-09-07 NOTE — QUICK NOTE
Left leg wound VAC dressing changed secondary to [facility] alerts for blockage. Wound stable localized erythema, without signs of active infection: no purulence, no malodor, no ascending erythema, no crepitus, no fluctuance. Healthy bleeding 100% granular tissue bed. Patient stable for discharge from podiatry standpoint. Wound VAC paperwork submitted, handed to case management. Wound VAC dressing change  1. Number of sponges: 2  2. Pressure settin continuous  3. Size of wound: 3 x 3 x 0.8cm  4. Description of wound: 100% granular  5.  C[place]ister fluid collection: None, new canister placed

## 2023-09-07 NOTE — PLAN OF CARE
Problem: PAIN - ADULT  Goal: Verbalizes/displays adequate comfort level or baseline comfort level  Description: Interventions:  - Encourage patient to monitor pain and request assistance  - Assess pain using appropriate pain scale  - Administer analgesics based on type and severity of pain and evaluate response  - Implement non-pharmacological measures as appropriate and evaluate response  - Consider cultural and social influences on pain and pain management  - Notify physician/advanced practitioner if interventions unsuccessful or patient reports new pain  Outcome: Progressing     Problem: INFECTION - ADULT  Goal: Absence or prevention of progression during hospitalization  Description: INTERVENTIONS:  - Assess and monitor for signs and symptoms of infection  - Monitor lab/diagnostic results  - Monitor all insertion sites, i.e. indwelling lines, tubes, and drains  - Monitor endotracheal if appropriate and nasal secretions for changes in amount and color  - [place] appropriate cooling/warming therapies per order  - Administer medications as ordered  - Instruct and encourage patient and family to use good hand hygiene technique  - Identify and instruct in appropriate isolation precautions for identified infection/condition  Outcome: Progressing  Goal: Absence of fever/infection during neutropenic period  Description: INTERVENTIONS:  - Monitor WBC    Outcome: Progressing     Problem: SAFETY ADULT  Goal: Patient will remain free of falls  Description: INTERVENTIONS:  - Educate patient/family on patient safety including physical limitations  - Instruct patient to call for assistance with activity   - Consult OT/PT to assist with strengthening/mobility   - Keep Call bell within reach  - Keep bed low and locked with side rails adjusted as appropriate  - Keep care items and personal belongings within reach  - Initiate and maintain comfort rounds  - Make Fall Risk Sign visible to staff  - Offer Toileting every 2 Hours, in advance of need  - Initiate/Maintain bed alarm  - Obtain necessary fall risk management equipment: non skid footwear  - Apply yellow socks and bracelet for high fall risk patients  - Consider moving patient to room near nurses station  Outcome: Progressing  Goal: Maintain or return to baseline ADL function  Description: INTERVENTIONS:  -  Assess patient's ability to carry out ADLs; assess patient's baseline for ADL function and identify physical deficits which impact ability to perform ADLs (bathing, care of mouth/teeth, toileting, grooming, dressing, etc.)  - Assess/evaluate cause of self-care deficits   - Assess range of motion  - Assess patient's mobility; develop plan if impaired  - Assess patient's need for assistive devices and provide as appropriate  - Encourage maximum independence but intervene and supervise when necessary  - Involve family in performance of ADLs  - Assess for home care needs following discharge   - Consider OT consult to assist with ADL evaluation and planning for discharge  - Provide patient education as appropriate  Outcome: Progressing  Goal: Maintains/Returns to pre admission functional level  Description: INTERVENTIONS:  - Perform BMAT or MOVE assessment daily.   - Set and communicate daily mobility goal to care team and patient/family/caregiver. - Collaborate with rehabilitation services on mobility goals if consulted  - Perform Range of Motion 3 times a day. - Reposition patient every 2 hours.   - Dangle patient 3 times a day  - Stand patient 3 times a day  - Ambulate patient 3 times a day  - Out of bed to chair 3 times a day   - Out of bed for meals 3 times a day  - Out of bed for toileting  - Record patient progress and toleration of activity level   Outcome: Progressing

## 2023-09-07 NOTE — PLAN OF CARE
Problem: PHYSICAL THERAPY ADULT  Goal: Performs mobility at highest level of function for planned discharge setting. See evaluation for individualized goals. Description: Treatment/Interventions: ADL retraining, Functional transfer training, LE strengthening/ROM, Elevations, Therapeutic exercise, Endurance training, Bed mobility, Gait training, Spoke to nursing, OT          See flowsheet documentation for full assessment, interventions and recommendations. Note: Prognosis: Good  Problem List: Decreased strength, Decreased range of motion, Decreased endurance, Impaired balance, Decreased mobility, Pain, Obesity  Assessment: PT orders received and acknowledged. Patient was seen today for high complexity PT evaluation. High complexity evaluation due to Ongoing medical management for primary dx, Fall risk, Continuous pulse oximetry monitoring , s/p surgical intervention Patient is a 82 y.o. female  who was admitted to [address] [place] [address] on 9/5/2023  with Non-healing wound of left lower extremity . Pt presents to [facility] c/o chest discomfort and dyspnea on exertion . Patient performed functional mobility as described above. At baseline, pt resides with spouse in house and was independent prior to hospital admission. Currently, upon initial examination, pt  is requiring  supervision for functional transfers and supervision for ambulation with no AD. Patient currently presents below baseline with limitations in gait, balance, and transfers. Patient will benefit from continued PT services while in hospital in order to address remaining limitations. The patients AM-PAC Basic Mobility Inpatient Short From Raw Score is 23 . A Raw score of 23  suggests that the patient may benefit from discharge to home . PT recommendation at this time is for home with OPPT services . Please also refer to the recommendation of the Physical Therapist for safe discharge planning.         PT Discharge Recommendation: Home with outpatient rehabilitation    See flowsheet documentation for full assessment.

## 2023-09-07 NOTE — PROGRESS NOTES
Vancomycin Addendum:     Josh Vilchis is a 82 y.o. female who is currently ordered Vancomycin IV with management by the Pharmacy Consult service. Vancomycin 1250 mg IV once was ordered today for 1300 as level is predicted to fall below <15 at that time. Vancomycin random 9/8 with AM labs is ordered.     Odalis Bonilla, PharmD, BCIDP

## 2023-09-07 NOTE — DISCHARGE INSTR - AVS FIRST PAGE
Discharge Instructions - Podiatry    Weight Bearing Status: Weight bearing as tolerated                    Pain: Continue analgesics as directed    Follow-up appointment instructions: Please make an appointment within one week of discharge with Dr. Sherren Peru. Contact sooner if any increase in pain, or signs of infection occur    Wound Care: Leave dressings clean, dry, and intact between professional dressing changes    Nursing Instructions: Please apply a black foam sponge to fit the size of the wound and secure with the adhesive tape. Then cut a small hole in the foam to allow for secure attachment of trackpad. Connect the vac tubing and ensure continuous setting, 125 mmHg is running on the vac. Cover with gauze, kerlex, and a light ACE    Please change dressing every Monday, Wednesday, and Friday.   Last VAC change was 9/8/2023

## 2023-09-08 VITALS
HEART RATE: 88 BPM | TEMPERATURE: 97.3 F | SYSTOLIC BLOOD PRESSURE: 114 MMHG | BODY MASS INDEX: 38.32 KG/M2 | HEIGHT: 65 IN | WEIGHT: 230 LBS | RESPIRATION RATE: 16 BRPM | OXYGEN SATURATION: 90 % | DIASTOLIC BLOOD PRESSURE: 50 MMHG

## 2023-09-08 LAB
ANION GAP SERPL CALCULATED.3IONS-SCNC: 14 MMOL/L
BACTERIA SPEC ANAEROBE CULT: NORMAL
BUN SERPL-MCNC: 30 MG/DL (ref 5–25)
CALCIUM SERPL-MCNC: 9.2 MG/DL (ref 8.4–10.2)
CHLORIDE SERPL-SCNC: 99 MMOL/L (ref 96–108)
CO2 SERPL-SCNC: 21 MMOL/L (ref 21–32)
CREAT SERPL-MCNC: 1.06 MG/DL (ref 0.6–1.3)
GFR SERPL CREATININE-BSD FRML MDRD: 49 ML/MIN/1.73SQ M
GLUCOSE SERPL-MCNC: 100 MG/DL (ref 65–140)
GLUCOSE SERPL-MCNC: 101 MG/DL (ref 65–140)
GLUCOSE SERPL-MCNC: 84 MG/DL (ref 65–140)
MRSA NOSE QL CULT: NORMAL
POTASSIUM SERPL-SCNC: 5.1 MMOL/L (ref 3.5–5.3)
SODIUM SERPL-SCNC: 134 MMOL/L (ref 135–147)
VANCOMYCIN SERPL-MCNC: 10.5 UG/ML (ref 10–20)

## 2023-09-08 PROCEDURE — 99231 SBSQ HOSP IP/OBS SF/LOW 25: CPT | Performed by: PODIATRIST

## 2023-09-08 PROCEDURE — 97605 NEG PRS WND THER DME<=50SQCM: CPT | Performed by: PODIATRIST

## 2023-09-08 PROCEDURE — 99239 HOSP IP/OBS DSCHRG MGMT >30: CPT | Performed by: STUDENT IN AN ORGANIZED HEALTH CARE EDUCATION/TRAINING PROGRAM

## 2023-09-08 PROCEDURE — 80048 BASIC METABOLIC PNL TOTAL CA: CPT | Performed by: STUDENT IN AN ORGANIZED HEALTH CARE EDUCATION/TRAINING PROGRAM

## 2023-09-08 PROCEDURE — 80202 ASSAY OF VANCOMYCIN: CPT | Performed by: STUDENT IN AN ORGANIZED HEALTH CARE EDUCATION/TRAINING PROGRAM

## 2023-09-08 PROCEDURE — 82948 REAGENT STRIP/BLOOD GLUCOSE: CPT

## 2023-09-08 RX ADMIN — RIVAROXABAN 20 MG: 20 TABLET, FILM COATED ORAL at 08:10

## 2023-09-08 RX ADMIN — TORSEMIDE 20 MG: 20 TABLET ORAL at 08:10

## 2023-09-08 RX ADMIN — LORATADINE 10 MG: 10 TABLET ORAL at 08:10

## 2023-09-08 RX ADMIN — METOPROLOL TARTRATE 75 MG: 50 TABLET, FILM COATED ORAL at 08:10

## 2023-09-08 RX ADMIN — B-COMPLEX W/ C & FOLIC ACID TAB 1 TABLET: TAB at 08:10

## 2023-09-08 RX ADMIN — GABAPENTIN 800 MG: 400 CAPSULE ORAL at 12:07

## 2023-09-08 RX ADMIN — FLUTICASONE PROPIONATE 1 SPRAY: 50 SPRAY, METERED NASAL at 08:10

## 2023-09-08 RX ADMIN — GABAPENTIN 800 MG: 400 CAPSULE ORAL at 08:10

## 2023-09-08 NOTE — ASSESSMENT & PLAN NOTE
Patient hit her LLE on the car door 2 weeks ago and developed a wound at that area. She was evaluated in ED on 8/26 and was suspected to have infected hematoma which was aspirated per ED note and was placed on clindamycin. Patient presents from catheterization for left lateral leg eschar. 9/6: S/p bedside I&D with placement of wound VAC with podiatry. Noted to have large hematoma with no purulence noted.   Arterial duplex: No significant arterial disease  Venous duplex: No signs of DVT    · Biotics discontinued given no signs of infection  · Local wound care and wound VAC with podiatry  · Blood cultures negative and wound culture with staph epi, contaminant  · Outpatient follow-up with podiatry and home nurses will follow-up with patient next week

## 2023-09-08 NOTE — PROGRESS NOTES
[facility] Podiatry - Progress Note  Patient: Sheila Paulino 82 y.o. female   MRN: [id number]  PCP: Yesenia Parnell MD  Unit/Bed#: [facility] 792-37 Encounter: [id number]  Date Of Visit: 23    ASSESSMENT:    Sheila Paulino is a 82 y.o. female with:    1. Eschar, left lateral leg  2. Hematoma, left lateral leg  3. Type 2 diabetes with polyneuropathy  4. Venous insufficiency  5. Peripheral atherosclerosis  6. CHF  7. CKD stage III  8. Chronic edema     PLAN:    · Plan for continued wound vac therapy for left lower extremity wound. Patient is to be discharged with a home vac for continued treatment outpatient. · Home vac system applied at bedside today, discussed components with patient. Advised her she must take all supplies with her upon discharge See template below for vac application  · Left lower extremity wound is stable, no acute clinical signs of infection. · Wound care instructions placed for visiting nursing performing wound vac therapy changes. · Elevation on pillows when non-ambulatory. · Rest of care per primary team.    Weight bearing status: Weightbearing as tolerated    Wound VAC application  1. Number of sponges: 2  2. Pressure settin continuous  3. Size of wound: 3cm x 3 cm x 0.5cm  4. Description of wound: 100% granular, bleeding wound base      SUBJECTIVE:     The patient was seen, evaluated, and assessed at bedside today. The patient was awake, alert, and in no acute distress. No acute events overnight. The patient reports mild pain in her left leg with dressing changes due to the adhesive, but otherwise is comfortable. Patient denies N/V/F/chills/SOB/CP.       OBJECTIVE:     Vitals:   /50   Pulse 88   Temp (!) 97.3 °F (36.3 °C)   Resp 16   Ht 5' 4.5" (1.638 m)   Wt 104 kg (230 lb)   SpO2 90%   BMI 38.87 kg/m²     Temp (24hrs), Av.5 °F (36.4 °C), Min:97.3 °F (36.3 °C), Max:97.6 °F (36.4 °C)      Physical Exam:     General:  Alert, cooperative, and in no distress. Lower extremity exam:  Cardiovascular status at baseline. Neurological status at baseline. Musculoskeletal status at baseline. No calf tenderness noted. Lower extremity wound(s) as noted below:  Wound #: 1  Location: left lateral leg  Length 3cm: Width 3cm: Depth 0.5cm:   Deepest Tissue Noted in Base: deep tissue  Probe to Bone: No  Peripheral Skin Description: Attached  Granulation: 100% Fibrotic Tissue: 0% Necrotic Tissue: 0%   Drainage Amount: minimal, bloody  Signs of Infection: No - no purulence, no malodor, no ascending erythema, no crepitus, no fluctuance. Clinical Images 09/08/23: Additional Data:     Labs:    Results from last 7 days   Lab Units 09/07/23  0450   WBC Thousand/uL 6.88   HEMOGLOBIN g/dL 12.3   HEMATOCRIT % 39.3   PLATELETS Thousands/uL 209   NEUTROS PCT % 68   LYMPHS PCT % 22   MONOS PCT % 9   EOS PCT % 0     Results from last 7 days   Lab Units 09/08/23  0526 09/06/23  0530 09/05/23  1432   POTASSIUM mmol/L 5.1   < > 3.9   CHLORIDE mmol/L 99   < > 101   CO2 mmol/L 21   < > 33*   BUN mg/dL 30*   < > 27*   CREATININE mg/dL 1.06   < > 0.94   CALCIUM mg/dL 9.2   < > 9.1   ALK PHOS U/L  --   --  68   ALT U/L  --   --  11   AST U/L  --   --  20    < > = values in this interval not displayed. * I Have Reviewed All Lab Data Listed Above. Recent Cultures (last 7 days):     Results from last 7 days   Lab Units 09/06/23  1525 09/05/23  1433   BLOOD CULTURE   --  No Growth at 48 hrs. No Growth at 48 hrs. GRAM STAIN RESULT  No Polys or Bacteria seen  --    WOUND CULTURE  1+ Growth of Staphylococcus coagulase negative*  --      Results from last 7 days   Lab Units 09/06/23  1525   ANAEROBIC CULTURE  No anaerobes isolated       Imaging: I have personally reviewed pertinent films in PACS  EKG, Pathology, and Other Studies: I have personally reviewed pertinent reports.       ** Please Note: Portions of the record may have been created with voice recognition software. Occasional wrong word or "sound a like" substitutions may have occurred due to the inherent limitations of voice recognition software. Read the chart carefully and recognize, using context, where substitutions have occurred.  **

## 2023-09-08 NOTE — CASE MANAGEMENT
[address] [place] [address] has received approval to release wound vac to patient. Assigned vac #: [id number]  Proof of delivery PPW given to Care Manager to deliver to patient.

## 2023-09-08 NOTE — CASE MANAGEMENT
Case Management Discharge Planning Note    Patient name Lavell Govern  Location [facility] 809/[facility] 230-15 MRN [id number]  : 1940 Date 2023       Current Admission Date: 2023  Current Admission Diagnosis:Non-healing wound of left lower extremity   Patient Active Problem List    Diagnosis Date Noted   • Non-healing wound of left lower extremity 2023   • Aortic stenosis, severe 2023   • Chronic left shoulder pain 2023   • Chronic diastolic CHF (congestive heart failure) ([address]) 2022   • Chronic anticoagulation 2022   • Status post total knee replacement using cement, right 2022   • Stage 3a chronic kidney disease ([address]) 10/06/2021   • Exotropia of right eye 2021   • Seasonal allergies 2021   • Leg swelling 2020   • Status post total knee replacement using cement, left    • Lichen sclerosus et atrophicus of the vulva 2020   • Colon polyps 2019   • Chronic edema 2019   • Deep venous insufficiency 2019   • Pacemaker    • GERD (gastroesophageal reflux disease)    • Mild cognitive impairment 2018   • Vitamin D deficiency 2018   • Carrero's esophagus with dysplasia 2018   • Sensory polyneuropathy 2018   • RLS (restless legs syndrome) 2018   • Acquired deformity of foot 2018   • Corns 2018   • Diabetic polyneuropathy associated with type 2 diabetes mellitus ([address]) 2018   • 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[address]) 2018   • Radiculopathy of lumbar region 2018   • Atrial fibrillation ([address]) [I48.91] 2018   • Dense breast tissue on mammogram 2017   • Difficulty walking 2017   • Flat foot 2017   • Onychomycosis 2017   • Hiatal hernia    • Multiple lung nodules 2015   • Severe obesity (BMI 35.0-39. 9) with comorbidity ([address]) 2014   • Osteopenia of multiple sites 2014   • Arthritis 2014   • Essential hypertension 2014   • Lichen sclerosus et atrophicus 05/08/2013   • Peripheral neuropathy 05/08/2013      LOS (days): 3  Geometric Mean LOS (GMLOS) (days): 2.10  Days to GMLOS:-0.9     OBJECTIVE:  Risk of Unplanned Readmission Score: 14.74         Current admission status: Inpatient   Preferred Pharmacy:   [facility], [address]  [address] [address]  Phone: [phone] Fax: [phone]    Primary Care Provider: Patricia Rubio MD    Primary Insurance: MEDICARE  Secondary Insurance: COMMERCIAL MISCELLANEOUS    DISCHARGE DETAILS:                                Requested [facility]         Is the patient interested in [address] at discharge?: Yes  [facility] requested[de-identified] [facility] Name[de-identified] [facility] External Referral Reason (only applicable if external HHA name selected): Services not provided in network or near patient location  [facility] Provider[de-identified] Referring Provider  Home Health Services Needed[de-identified] Wound/Ostomy Care  Homebound Criteria Met[de-identified] Requires the Assistance of Another Person for Safe Ambulation or to Leave the Home  Supporting Clincal Findings[de-identified] Limited Endurance    DME Referral Provided  Referral made for DME?: Yes  DME referral completed for the following items[de-identified] Other (wound vac)  DME Supplier Name[de-identified] Other (Add supplier name in comments) [employer]              Treatment Team Recommendation: Home with [address]  Discharge Destination Plan[de-identified] Home with [address] [facility] at Discharge : Family                             IMM Given (Date):: 09/08/23  IMM Given to[de-identified] Patient     Additional Comments: Patient expected to be cleared today, home with home health/nursing provided by [facility]. Wound vac ordered from [facility], delivered to room. Patient's family to provide d/c transportation.

## 2023-09-08 NOTE — DISCHARGE SUMMARY
[address] [facility]  Discharge- Salud Mast 1940, 82 y.o. female MRN: [id number]  Unit/Bed#: [facility] 809-01 Encounter: [id number]  Primary Care Provider: Ree Hernandez MD   Date and time admitted to hospital: 9/5/2023  6:53 AM    * Non-healing wound of left lower extremity  Assessment & Plan  Patient hit her LLE on the car door 2 weeks ago and developed a wound at that area. She was evaluated in ED on 8/26 and was suspected to have infected hematoma which was aspirated per ED note and was placed on clindamycin. Patient presents from catheterization for left lateral leg eschar. 9/6: S/p bedside I&D with placement of wound VAC with podiatry. Noted to have large hematoma with no purulence noted. Arterial duplex: No significant arterial disease  Venous duplex: No signs of DVT    · Biotics discontinued given no signs of infection  · Local wound care and wound VAC with podiatry  · Blood cultures negative and wound culture with staph epi, contaminant  · Outpatient follow-up with podiatry and home nurses will follow-up with patient next week    Aortic stenosis, severe  Assessment & Plan  · Outpatient workup ongoing for TAVR. Stage 3a chronic kidney disease (HCC)  Assessment & Plan  · At baseline,  · Monitor BMP    RLS (restless legs syndrome)  Assessment & Plan  · Continue Mirapex twice daily at 5 PM and 10 PM    Diabetic polyneuropathy associated with type 2 diabetes mellitus ([address])  Assessment & Plan  · Diet controlled. · Continue gabapentin.      Atrial fibrillation ([address]) [I48.91]  Assessment & Plan  · Rate controlled with lopressor 75mg bid  · Continue Xarelto  · Monitor H&H closely      Medical Problems     Resolved Problems  Date Reviewed: 9/8/2023   None       Discharging Physician / Practitioner: Jamia Copeland DO  PCP: Ree Hernandez MD  Admission Date:   Admission Orders (From admission, onward)     Ordered        09/05/23 0949  Inpatient Admission Once                      Discharge Date: 09/08/23    Consultations During Hospital Stay:  · podiatry    Procedures Performed:   · Wound debridement with VAC placement    Significant Findings / Test Results:   · 9/5 left shoulder x-ray no acute abnormality  · 9/6 left tibiofibular x-ray no acute findings  · 9/5 blood cultures no growth at 48 hours  · 9/6 MRSA negative  · 9/6 wound culture with staph epi, likely contaminant    Incidental Findings:   · none    Test Results Pending at Discharge (will require follow up): · Final wound culture     Outpatient Tests Requested:  · none    Complications:  none    Reason for Admission: LLE non-healing wound    Hospital Course:   Ry Bailey is a 82 y.o. female patient who originally presented to the hospital on 9/5/2023 due to left lower extremity nonhealing wound with eschar. Patient underwent bedside wound debridement with placement of VAC with podiatry. No signs of infection and therefore antibiotics were discontinued. Wound culture and blood culture with no signs of infection. Patient will follow-up with podiatry outpatient and home VNA arranged for follow-up wound care. Patient is medically stable for discharge with outpatient follow-up with podiatry. Recommend follow-up with surgical team given outpatient plan for TAVR which may need to be delayed. Please see above list of diagnoses and related plan for additional information. Condition at Discharge: stable    Discharge Day Visit / Exam:   Subjective: Assessed at bedside. No complaints. Vitals: Blood Pressure: 114/50 (09/08/23 0749)  Pulse: 88 (09/08/23 0749)  Temperature: (!) 97.3 °F (36.3 °C) (09/08/23 0749)  Temp Source: Temporal (09/05/23 0935)  Respirations: 16 (09/08/23 0749)  Height: 5' 4.5" (163.8 cm) (09/05/23 0723)  Weight - Scale: 104 kg (230 lb) (09/05/23 0723)  SpO2: 90 % (09/08/23 0749)  Exam:   Physical Exam  Vitals reviewed.    Constitutional:       General: She is not in acute distress. Appearance: Normal appearance. She is not ill-appearing. HENT:      Head: Normocephalic and atraumatic. Eyes:      General: No scleral icterus. Conjunctiva/sclera: Conjunctivae normal.   Cardiovascular:      Rate and Rhythm: Normal rate and regular rhythm. Pulses: Normal pulses. Heart sounds: Normal heart sounds. Pulmonary:      Effort: Pulmonary effort is normal. No respiratory distress. Breath sounds: Normal breath sounds. Abdominal:      General: Abdomen is flat. Bowel sounds are normal. There is no distension. Palpations: Abdomen is soft. There is no mass. Tenderness: There is no abdominal tenderness. There is no guarding. Hernia: No hernia is present. Musculoskeletal:      Cervical back: No muscular tenderness. Right lower leg: No edema. Left lower leg: No edema. Skin:     General: Skin is warm and dry. Comments: Wound VAC in place in left lower extremity   Neurological:      General: No focal deficit present. Mental Status: She is alert. Mental status is at baseline. Psychiatric:         Mood and Affect: Mood normal.         Behavior: Behavior normal.          Discussion with Family: Updated  (son) at bedside. Discharge instructions/Information to patient and family:   See after visit summary for information provided to patient and family. Provisions for Follow-Up Care:  See after visit summary for information related to follow-up care and any pertinent home health orders. Disposition:   Home with VNA Services (Reminder: Complete face to face encounter)    Planned Readmission: None     Discharge Statement:  I spent 40 minutes discharging the patient. This time was spent on the day of discharge. I had direct contact with the patient on the day of discharge.  Greater than 50% of the total time was spent examining patient, answering all patient questions, arranging and discussing plan of care with patient as well as directly providing post-discharge instructions. Additional time then spent on discharge activities. Discharge Medications:  See after visit summary for reconciled discharge medications provided to patient and/or family.       **Please Note: This note may have been constructed using a voice recognition system**

## 2023-09-08 NOTE — CONSULTS
Vancomycin IV Pharmacy-to-Dose Consultation    Yola Walsh is a 82 y.o. female who was receiving Vancomycin IV with management by the Pharmacy Consult service for treatment of Soft tissue (goal -600, trough >10). The patient’s Vancomycin therapy has been discontinued. Thank you for allowing us to take part in this patient's care. Pharmacy will sign-off now; please call or re-consult if there are any questions.         Rc Mendoza, PharmD, [address] [place] [address] Clinical Pharmacist  [phone]

## 2023-09-08 NOTE — DISCHARGE INSTR - OTHER ORDERS
Discharge Instructions - Podiatry    Weight Bearing Status: Weight bearing as tolerated                    Pain: Continue analgesics as directed    Follow-up appointment instructions: Please make an appointment within one week of discharge with Dr. Gila Chi. Contact sooner if any increase in pain, or signs of infection occur    Wound Care: Leave dressings clean, dry, and intact between professional dressing changes    Nursing Instructions: Please apply a black foam sponge to fit the size of the wound and secure with the adhesive tape. Then cut a small hole in the foam to allow for secure attachment of trackpad. Connect the vac tubing and ensure continuous setting, 125 mmHg is running on the vac. Cover with gauze, kerlex, and a light ACE    Please change dressing every Monday, Wednesday, and Friday.   Last VAC change was 9/8/2023

## 2023-09-08 NOTE — PROGRESS NOTES
-- Patient: Sandy Madrid  -- MRN: [id number]  -- Aidin Request ID: [id number]  -- Level of care reserved: Rodolfo Montejo  -- Partner Reserved: [facility], [place], [address] [place] [address] [phone]  -- Clinical needs requested:  -- Geography searched: [phone]  -- Start of Service:  -- Request sent: 11:45am EDT on 9/7/2023 by Tameka Wyatt  -- Partner reserved: 9:05am EDT on 9/8/2023 by Tameka Wyatt  -- Choice list shared: 8:48am EDT on 9/8/2023 by Tameka Wyatt

## 2023-09-09 ENCOUNTER — HOSPITAL ENCOUNTER (EMERGENCY)
Facility: HOSPITAL | Age: 83
Discharge: HOME | End: 2023-09-09
Payer: MEDICARE

## 2023-09-09 VITALS
RESPIRATION RATE: 20 BRPM | SYSTOLIC BLOOD PRESSURE: 129 MMHG | DIASTOLIC BLOOD PRESSURE: 72 MMHG | HEART RATE: 107 BPM | BODY MASS INDEX: 38.87 KG/M2 | WEIGHT: 230 LBS | OXYGEN SATURATION: 93 % | TEMPERATURE: 97.3 F

## 2023-09-09 DIAGNOSIS — S81.802A NON-HEALING WOUND OF LEFT LOWER EXTREMITY: Primary | ICD-10-CM

## 2023-09-09 DIAGNOSIS — R58 BLEEDING: ICD-10-CM

## 2023-09-09 LAB
BACTERIA WND AEROBE CULT: ABNORMAL
GRAM STN SPEC: ABNORMAL

## 2023-09-09 PROCEDURE — 99284 EMERGENCY DEPT VISIT MOD MDM: CPT | Performed by: PHYSICIAN ASSISTANT

## 2023-09-09 PROCEDURE — 99284 EMERGENCY DEPT VISIT MOD MDM: CPT

## 2023-09-09 NOTE — ED PROVIDER NOTES
History  Chief Complaint   Patient presents with   • Bleeding/Bruising     Brought by son. Patient was inpatient at [facility] and discharged with wound vac to left lower leg. Patient on Xarelto. Visiting nurse came to change wound dressing and blood was in vac and when removed had bleeding and pressure dressing applied and sent to ED . Patient is an 29-year-old female with past medical history significant for atrial fibrillation on Xarelto, CHF, GERD, Carrero's esophagus, CAD, who was recently admitted at [address] [place] [address] for infected hematoma of the left lower leg and was discharged with a wound VAC. Today visiting nurse noted blood in the wound VAC-pressure dressing applied and patient referred to the ED for further evaluation. Patient also request to follow-up closer to home. History provided by:  Patient  Wound Check   Treatments since wound repair include oral antibiotics and a wound recheck. There has been bloody discharge from the wound. There is no redness present. The swelling has improved. The pain has improved. She has no difficulty moving the affected extremity or digit. Prior to Admission Medications   Prescriptions Last Dose Informant Patient Reported? Taking?    Calcium Acetate, Phos Binder, (CALCIUM ACETATE PO)  Self Yes No   Sig: Take by mouth daily     acetaminophen (TYLENOL) 325 mg tablet  Self No No   Sig: Take 3 tablets (975 mg total) by mouth every 8 (eight) hours   Patient taking differently: Take 975 mg by mouth every 8 (eight) hours As Needed   clobetasol (TEMOVATE) 0.05 % ointment  Self No No   Sig: Apply once weekly to the affected area   famotidine (PEPCID) 40 MG tablet  Self No No   Sig: TAKE 1 TABLET BY MOUTH EVERY DAY   fluticasone (FLONASE) 50 mcg/act nasal spray  Self No No   Sig: SPRAY 1 SPRAY INTO EACH NOSTRIL EVERY DAY   gabapentin (NEURONTIN) 800 mg tablet  Self No No   Sig: TAKE 1 TABLET BY MOUTH FOUR TIMES A DAY   loratadine (CLARITIN) 10 mg tablet  Self Yes No   Sig: Take 10 mg by mouth daily   magnesium 30 MG tablet  Self Yes No   Sig: Take 30 mg by mouth in the morning   metoprolol tartrate (LOPRESSOR) 50 mg tablet  Self No No   Sig: TAKE 1.5 TABLETS BY MOUTH 2 TIMES A DAY. multivitamin (THERAGRAN) TABS  Self Yes No   Sig: Take 1 tablet by mouth daily   pramipexole (MIRAPEX) 0.5 mg tablet  Self No No   Sig: TAKE 2 FULL TABLETS TWICE A DAY AT 5:00 P. M. AND 10:00 P. M.   rivaroxaban (Xarelto) 20 mg tablet  Self No No   Sig: Take 1 tablet (20 mg total) by mouth daily with breakfast   torsemide (DEMADEX) 20 mg tablet  Self No No   Sig: Take 1 tablet (20 mg total) by mouth daily      Facility-Administered Medications: None       Past Medical History:   Diagnosis Date   • Arthritis    • Atrial fibrillation ([address])    • Carrero's esophagus     last assessed: 1/23/2018   • BRCA1 negative    • BRCA2 negative    • Breast cancer ([address]) 2006    stage 1 (left), given adjuvant radiation with Arimidex x 5 years   • Cancer ([address]) 2006    Left Breast, Lumpectomy   • Cataract     last assessed: 3/11/2014   • Chronic diastolic CHF (congestive heart failure) ([address]) 11/21/2022   • Dysplasia of toenail     last assessed: 8/29/2017   • Esophageal reflux    • GERD (gastroesophageal reflux disease)    • Gross hematuria     last assessed: 2/19/2015   • Hematuria    • Hiatal hernia    • History of radiation therapy    • Hypertension    • Irregular heart beat     AFIB   • Mixed sensory-motor polyneuropathy    • Neuropathy    • Obesity    • Pacemaker    • Paroxysmal atrial fibrillation ([address])    • Peripheral neuropathy    • Rectal bleeding    • Restless leg syndrome    • Shortness of breath     last assessed: 1/11/2016       Past Surgical History:   Procedure Laterality Date   • BREAST BIOPSY Left 2006   • BREAST LUMPECTOMY Left 2006    onset: 2006   • BREAST SURGERY     • CARDIAC CATHETERIZATION N/A 9/5/2023    Procedure: Cardiac RHC/LHC;   Surgeon: Neva Bassett MD;  Location:  CARDIAC CATH LAB; Service: Cardiology   • CARDIAC CATHETERIZATION N/A 9/5/2023    Procedure: Cardiac Coronary Angiogram;  Surgeon: Yara Trujillo MD;  [facility]: [facility]; Service: Cardiology   • CARDIAC CATHETERIZATION  9/5/2023    Procedure: Cardiac catheterization;  Surgeon: Yara Trujillo MD;  Location: [facility]; Service: Cardiology   • CARDIAC PACEMAKER PLACEMENT      x 3 2006   • CATARACT EXTRACTION     • COLONOSCOPY     • CYSTOSCOPY  04/04/2014    diagnostic   • HYSTERECTOMY      ALISON BSO; due to fibroid uterus; age 38   • KNEE CARTILAGE SURGERY      excision lesion of meniscus or capsule knee   • KNEE SURGERY     • OOPHORECTOMY Bilateral     age 38   • OTHER SURGICAL HISTORY      radiation therapy   • NV ARTHRP KNE CONDYLE&PLATU MEDIAL&LAT COMPARTMENTS Left 08/17/2020    Procedure: ARTHROPLASTY KNEE TOTAL;  Surgeon: Radha Roach DO;  Location: [facility];  Service: Orthopedics   • NV ARTHRP KNE CONDYLE&PLATU MEDIAL&LAT COMPARTMENTS Right 03/28/2022    Procedure: ARTHROPLASTY KNEE TOTAL W ROBOT - RIGHT;  Surgeon: Radha Roach DO;  Location: [facility];  Service: Orthopedics   • NV COLONOSCOPY FLX DX W/COLLJ SPEC WHEN PFRMD N/A 02/08/2017    Procedure: COLONOSCOPY;  Surgeon: Johanna Jimenez MD;  Location: [facility]; Service: Gastroenterology   • NV ESOPHAGOGASTRODUODENOSCOPY TRANSORAL DIAGNOSTIC N/A 09/20/2017    Procedure: ESOPHAGOGASTRODUODENOSCOPY (EGD); Surgeon: Naman Hawthorne MD;  Location: [facility];   Service: Gastroenterology   • UPPER GASTROINTESTINAL ENDOSCOPY         Family History   Problem Relation Age of Onset   • Hypertension Mother    • Heart disease Father    • Aneurysm Father    • Coronary artery disease Father         in his 76s with aneurysm   • Aortic aneurysm Father         abdominal   • Scleroderma Sister    • Breast cancer Sister 76   • Hypertension Sister    • Cancer Sister    • No Known Problems Son    • No Known Problems Son    • Testicular cancer Son 39   • Thyroid cancer Son 45   • Colon cancer Maternal Aunt    • Colon cancer Maternal Aunt    • Breast cancer Other 48        kaylee's daughter   • Alcohol abuse Neg Hx    • Substance Abuse Neg Hx    • Mental illness Neg Hx    • Depression Neg Hx      I have reviewed and agree with the history as documented. E-Cigarette/Vaping   • E-Cigarette Use Never User      E-Cigarette/Vaping Substances   • Nicotine No    • THC No    • CBD No    • Flavoring No    • Other No    • Unknown No      Social History     Tobacco Use   • Smoking status: Former     Packs/day: 1.00     Years: 25.00     Total pack years: 25.00     Types: Cigarettes     Quit date: 1980     Years since quittin.0   • Smokeless tobacco: Never   • Tobacco comments:     Quit over 30 years ago; quit age 41   Vaping Use   • Vaping Use: Never used   Substance Use Topics   • Alcohol use: Not Currently   • Drug use: No       Review of Systems   Constitutional: Negative. Negative for chills and fever. HENT: Negative. Negative for ear pain and sore throat. Eyes: Negative. Negative for pain and visual disturbance. Respiratory: Negative. Negative for cough and shortness of breath. Cardiovascular: Negative. Negative for chest pain and palpitations. Gastrointestinal: Negative. Negative for abdominal pain and vomiting. Endocrine: Negative. Genitourinary: Negative. Negative for dysuria and hematuria. Musculoskeletal: Negative for arthralgias and back pain. Skin: Positive for wound. Negative for color change and rash. Allergic/Immunologic: Negative. Neurological: Negative. Negative for seizures and syncope. Hematological: Negative. Psychiatric/Behavioral: Negative. All other systems reviewed and are negative. Physical Exam  Physical Exam  Vitals and nursing note reviewed. Constitutional:       General: She is not in acute distress. Appearance: Normal appearance. She is well-developed.    HENT:      Head: Normocephalic and atraumatic. Right Ear: External ear normal.      Left Ear: External ear normal.      Nose: Nose normal.      Mouth/Throat:      Mouth: Mucous membranes are moist.      Pharynx: No oropharyngeal exudate. Eyes:      General: No scleral icterus. Conjunctiva/sclera: Conjunctivae normal.      Pupils: Pupils are equal, round, and reactive to light. Cardiovascular:      Rate and Rhythm: Normal rate and regular rhythm. Pulses: Normal pulses. Heart sounds: Normal heart sounds. No murmur heard. Pulmonary:      Effort: Pulmonary effort is normal. No respiratory distress. Breath sounds: Normal breath sounds. Abdominal:      General: Abdomen is flat. Palpations: Abdomen is soft. Tenderness: There is no abdominal tenderness. There is no right CVA tenderness or left CVA tenderness. Musculoskeletal:         General: No swelling. Normal range of motion. Cervical back: Normal range of motion and neck supple. Right lower leg: Edema present. Left lower leg: Edema present. Legs:    Skin:     General: Skin is warm and dry. Capillary Refill: Capillary refill takes less than 2 seconds. Neurological:      Mental Status: She is alert.    Psychiatric:         Mood and Affect: Mood normal.         Vital Signs  ED Triage Vitals [09/09/23 1340]   Temperature Pulse Respirations Blood Pressure SpO2   (!) 97.3 °F (36.3 °C) (!) 107 20 129/72 93 %      Temp Source Heart Rate Source Patient Position - Orthostatic VS BP Location FiO2 (%)   Tympanic Monitor Sitting Right leg --      Pain Score       --           Vitals:    09/09/23 1340   BP: 129/72   Pulse: (!) 107   Patient Position - Orthostatic VS: Sitting         Visual Acuity      ED Medications  Medications - No data to display    Diagnostic Studies  Results Reviewed     None                 No orders to display              Procedures  Procedures         ED Course                               SBIRT 22yo+ Flowsheet Row Most Recent Value   Initial Alcohol Screen: US AUDIT-C     1. How often do you have a drink containing alcohol? 0 Filed at: 09/09/2023 1342   Audit-C Score 0 Filed at: 09/09/2023 1342   KIM: How many times in the past year have you. .. Used an illegal drug or used a prescription medication for non-medical reasons? Never Filed at: 09/09/2023 1342                    Medical Decision Making  Patient with nonhealing wound of left lower extremity secondary to infected hematoma following traumatic injury  Wound exam noted rewrapped-bleeding is scant  Avoid wound VAC at this time  Spoke with Dr. J Luis Olivarez podiatry who will see patient in the office for further evaluation of her nonhealing wound    Bleeding: acute illness or injury  Non-healing wound of left lower extremity: acute illness or injury      Disposition  Final diagnoses:   Bleeding   Non-healing wound of left lower extremity     Time reflects when diagnosis was documented in both MDM as applicable and the Disposition within this note     Time User Action Codes Description Comment    9/9/2023  3:23 PM Blenda Railing Add [R58] Bleeding     9/9/2023  3:24 PM Blenda Railing Add [S81.802A] Non-healing wound of left lower extremity       ED Disposition     ED Disposition   Discharge    Condition   Stable    Date/Time   Sat Sep 9, 2023  3:23 PM    Comment   Lashon Ortiz discharge to home/self care.                Follow-up Information     Follow up With Specialties Details Why Contact Info    Sang Dugan MD Internal Medicine Schedule an appointment as soon as possible for a visit  As needed [facility]  [address] [facility]      Hermila Cadet DPM Podiatry Schedule an appointment as soon as possible for a visit   [address] [address]  [address] [facility] [place]            Discharge Medication List as of 9/9/2023  3:25 PM      CONTINUE these medications which have NOT CHANGED    Details acetaminophen (TYLENOL) 325 mg tablet Take 3 tablets (975 mg total) by mouth every 8 (eight) hours, Starting Tue 3/29/2022, No Print      Calcium Acetate, Phos Binder, (CALCIUM ACETATE PO) Take by mouth daily  , Historical Med      clobetasol (TEMOVATE) 0.05 % ointment Apply once weekly to the affected area, Normal      famotidine (PEPCID) 40 MG tablet TAKE 1 TABLET BY MOUTH EVERY DAY, Normal      fluticasone (FLONASE) 50 mcg/act nasal spray SPRAY 1 SPRAY INTO EACH NOSTRIL EVERY DAY, Normal      gabapentin (NEURONTIN) 800 mg tablet TAKE 1 TABLET BY MOUTH FOUR TIMES A DAY, Normal      loratadine (CLARITIN) 10 mg tablet Take 10 mg by mouth daily, Historical Med      magnesium 30 MG tablet Take 30 mg by mouth in the morning, Historical Med      metoprolol tartrate (LOPRESSOR) 50 mg tablet TAKE 1.5 TABLETS BY MOUTH 2 TIMES A DAY., Normal      multivitamin (THERAGRAN) TABS Take 1 tablet by mouth daily, Historical Med      pramipexole (MIRAPEX) 0.5 mg tablet TAKE 2 FULL TABLETS TWICE A DAY AT 5:00 P. M.  AND 10:00 P.M., Normal      rivaroxaban (Xarelto) 20 mg tablet Take 1 tablet (20 mg total) by mouth daily with breakfast, Starting Fri 7/28/2023, Normal      torsemide (DEMADEX) 20 mg tablet Take 1 tablet (20 mg total) by mouth daily, Starting Wed 11/23/2022, Normal                 PDMP Review       Value Time User    PDMP Reviewed  Yes 4/22/2022 10:26 AM Zenaida Cleary PA-C          ED Provider  Electronically Signed by           Janusz Vásquez PA-C  09/10/23 1800

## 2023-09-10 DIAGNOSIS — I48.20 CHRONIC ATRIAL FIBRILLATION (HCC): ICD-10-CM

## 2023-09-10 LAB
BACTERIA BLD CULT: NORMAL
BACTERIA BLD CULT: NORMAL

## 2023-09-11 ENCOUNTER — VBI (OUTPATIENT)
Dept: INTERNAL MEDICINE CLINIC | Facility: CLINIC | Age: 83
End: 2023-09-11

## 2023-09-11 ENCOUNTER — TRANSITIONAL CARE MANAGEMENT (OUTPATIENT)
Dept: INTERNAL MEDICINE CLINIC | Facility: CLINIC | Age: 83
End: 2023-09-11

## 2023-09-11 NOTE — TELEPHONE ENCOUNTER
09/11/23 12:01 PM    Patient contacted post ED visit, [facility] department spoke with patient/caregiver and outreach was successful. Thank you.   Elizabeth Mayes PG VALUE BASED VIR

## 2023-09-12 RX ORDER — METOPROLOL TARTRATE 50 MG/1
TABLET, FILM COATED ORAL
Qty: 270 TABLET | Refills: 3 | Status: SHIPPED | OUTPATIENT
Start: 2023-09-12

## 2023-09-13 ENCOUNTER — OFFICE VISIT (OUTPATIENT)
Dept: PODIATRY | Facility: CLINIC | Age: 83
End: 2023-09-13
Payer: MEDICARE

## 2023-09-13 VITALS
DIASTOLIC BLOOD PRESSURE: 72 MMHG | BODY MASS INDEX: 38.32 KG/M2 | RESPIRATION RATE: 17 BRPM | HEIGHT: 65 IN | SYSTOLIC BLOOD PRESSURE: 129 MMHG | WEIGHT: 230 LBS

## 2023-09-13 DIAGNOSIS — S81.812A LACERATION OF LEG EXCLUDING THIGH, LEFT, INITIAL ENCOUNTER: ICD-10-CM

## 2023-09-13 DIAGNOSIS — I87.2 DEEP VENOUS INSUFFICIENCY: ICD-10-CM

## 2023-09-13 DIAGNOSIS — E11.51 TYPE 2 DIABETES MELLITUS WITH DIABETIC PERIPHERAL ANGIOPATHY WITHOUT GANGRENE, WITHOUT LONG-TERM CURRENT USE OF INSULIN (HCC): Primary | ICD-10-CM

## 2023-09-13 DIAGNOSIS — R60.9 CHRONIC EDEMA: ICD-10-CM

## 2023-09-13 DIAGNOSIS — I70.209 PERIPHERAL ARTERIOSCLEROSIS (HCC): ICD-10-CM

## 2023-09-13 PROCEDURE — 99214 OFFICE O/P EST MOD 30 MIN: CPT | Performed by: PODIATRIST
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
## 2023-09-15 ENCOUNTER — HOSPITAL ENCOUNTER (EMERGENCY)
Facility: HOSPITAL | Age: 83
Discharge: HOME | End: 2023-09-15
Payer: MEDICARE

## 2023-09-15 ENCOUNTER — TELEPHONE (OUTPATIENT)
Age: 83
End: 2023-09-15

## 2023-09-15 VITALS
HEIGHT: 65 IN | DIASTOLIC BLOOD PRESSURE: 80 MMHG | BODY MASS INDEX: 38.32 KG/M2 | RESPIRATION RATE: 18 BRPM | HEART RATE: 100 BPM | WEIGHT: 230 LBS | OXYGEN SATURATION: 96 % | TEMPERATURE: 98.1 F | SYSTOLIC BLOOD PRESSURE: 135 MMHG

## 2023-09-15 DIAGNOSIS — S81.802A NON-HEALING WOUND OF LEFT LOWER EXTREMITY: Primary | ICD-10-CM

## 2023-09-15 PROCEDURE — 99284 EMERGENCY DEPT VISIT MOD MDM: CPT | Performed by: PHYSICIAN ASSISTANT

## 2023-09-15 PROCEDURE — 99283 EMERGENCY DEPT VISIT LOW MDM: CPT

## 2023-09-15 RX ORDER — CLINDAMYCIN HYDROCHLORIDE 300 MG/1
300 CAPSULE ORAL 4 TIMES DAILY
Qty: 28 CAPSULE | Refills: 0 | Status: SHIPPED | OUTPATIENT
Start: 2023-09-15 | End: 2023-09-22

## 2023-09-15 NOTE — ED PROVIDER NOTES
History  Chief Complaint   Patient presents with   • Wound Check     Left lower leg wound was bleeding and is red, visiting nurse stated she should come here     Patient is an 29-year-old female with past medical history significant for atrial fibrillation on Xarelto, CHF, GERD, Carrero's esophagus, CAD who has been under care for infected hematoma of the left lower leg. Visiting nurse today evaluated patient and felt the wound was more erythematous and needed to be evaluated in the ED. Patient denies fever, chills, nausea, vomiting, increased fatigue. She denies increased pain from the wound. Prior to Admission Medications   Prescriptions Last Dose Informant Patient Reported? Taking? Calcium Acetate, Phos Binder, (CALCIUM ACETATE PO)  Self Yes No   Sig: Take by mouth daily     acetaminophen (TYLENOL) 325 mg tablet  Self No No   Sig: Take 3 tablets (975 mg total) by mouth every 8 (eight) hours   Patient taking differently: Take 975 mg by mouth every 8 (eight) hours As Needed   clobetasol (TEMOVATE) 0.05 % ointment  Self No No   Sig: Apply once weekly to the affected area   famotidine (PEPCID) 40 MG tablet  Self No No   Sig: TAKE 1 TABLET BY MOUTH EVERY DAY   fluticasone (FLONASE) 50 mcg/act nasal spray  Self No No   Sig: SPRAY 1 SPRAY INTO EACH NOSTRIL EVERY DAY   gabapentin (NEURONTIN) 800 mg tablet  Self No No   Sig: TAKE 1 TABLET BY MOUTH FOUR TIMES A DAY   loratadine (CLARITIN) 10 mg tablet  Self Yes No   Sig: Take 10 mg by mouth daily   magnesium 30 MG tablet  Self Yes No   Sig: Take 30 mg by mouth in the morning   metoprolol tartrate (LOPRESSOR) 50 mg tablet   No No   Sig: TAKE 1 AND 1/2 TABLETS BY MOUTH 2 TIMES A DAY   multivitamin (THERAGRAN) TABS  Self Yes No   Sig: Take 1 tablet by mouth daily   pramipexole (MIRAPEX) 0.5 mg tablet  Self No No   Sig: TAKE 2 FULL TABLETS TWICE A DAY AT 5:00 P. M. AND 10:00 P. M.   rivaroxaban (Xarelto) 20 mg tablet  Self No No   Sig: Take 1 tablet (20 mg total) by mouth daily with breakfast   torsemide (DEMADEX) 20 mg tablet  Self No No   Sig: Take 1 tablet (20 mg total) by mouth daily      Facility-Administered Medications: None       Past Medical History:   Diagnosis Date   • Arthritis    • Atrial fibrillation ([address])    • Carrero's esophagus     last assessed: 1/23/2018   • BRCA1 negative    • BRCA2 negative    • Breast cancer ([address]) 2006    stage 1 (left), given adjuvant radiation with Arimidex x 5 years   • Cancer ([address]) 2006    Left Breast, Lumpectomy   • Cataract     last assessed: 3/11/2014   • Chronic diastolic CHF (congestive heart failure) ([address]) 11/21/2022   • Dysplasia of toenail     last assessed: 8/29/2017   • Esophageal reflux    • GERD (gastroesophageal reflux disease)    • Gross hematuria     last assessed: 2/19/2015   • Hematuria    • Hiatal hernia    • History of radiation therapy    • Hypertension    • Irregular heart beat     AFIB   • Mixed sensory-motor polyneuropathy    • Neuropathy    • Obesity    • Pacemaker    • Paroxysmal atrial fibrillation ([address])    • Peripheral neuropathy    • Rectal bleeding    • Restless leg syndrome    • Shortness of breath     last assessed: 1/11/2016       Past Surgical History:   Procedure Laterality Date   • BREAST BIOPSY Left 2006   • BREAST LUMPECTOMY Left 2006    onset: 2006   • BREAST SURGERY     • CARDIAC CATHETERIZATION N/A 9/5/2023    Procedure: Cardiac RHC/LHC; Surgeon: Cecil Frye MD;  [facility]; Service: Cardiology   • CARDIAC CATHETERIZATION N/A 9/5/2023    Procedure: Cardiac Coronary Angiogram;  Surgeon: Cecil Frye MD;  [facility]; Service: Cardiology   • CARDIAC CATHETERIZATION  9/5/2023    Procedure: Cardiac catheterization;  Surgeon: Cecil Frye MD;  [facility];   Service: Cardiology   • CARDIAC PACEMAKER PLACEMENT      x 3 2006   • CATARACT EXTRACTION     • COLONOSCOPY     • CYSTOSCOPY  04/04/2014    diagnostic   • HYSTERECTOMY      ALISON BSO; due to fibroid uterus; age 38   • KNEE CARTILAGE SURGERY      excision lesion of meniscus or capsule knee   • KNEE SURGERY     • OOPHORECTOMY Bilateral     age 38   • OTHER SURGICAL HISTORY      radiation therapy   • MO ARTHRP KNE CONDYLE&PLATU MEDIAL&LAT COMPARTMENTS Left 08/17/2020    Procedure: ARTHROPLASTY KNEE TOTAL;  Surgeon: Samantha Damon DO;  Location: [facility];  Service: Orthopedics   • MO ARTHRP KNE CONDYLE&PLATU MEDIAL&LAT COMPARTMENTS Right 03/28/2022    Procedure: ARTHROPLASTY KNEE TOTAL W ROBOT - RIGHT;  Surgeon: Samantha Damon DO;  Location: [facility];  Service: Orthopedics   • MO COLONOSCOPY FLX DX W/COLLJ SPEC WHEN PFRMD N/A 02/08/2017    Procedure: COLONOSCOPY;  Surgeon: Angela Rivas MD;  Location:  GI LAB; Service: Gastroenterology   • MO ESOPHAGOGASTRODUODENOSCOPY TRANSORAL DIAGNOSTIC N/A 09/20/2017    Procedure: ESOPHAGOGASTRODUODENOSCOPY (EGD); Surgeon: Alek Newman MD;  Location: [facility]; Service: Gastroenterology   • UPPER GASTROINTESTINAL ENDOSCOPY         Family History   Problem Relation Age of Onset   • Hypertension Mother    • Heart disease Father    • Aneurysm Father    • Coronary artery disease Father         in his 76s with aneurysm   • Aortic aneurysm Father         abdominal   • Scleroderma Sister    • Breast cancer Sister 76   • Hypertension Sister    • Cancer Sister    • No Known Problems Son    • No Known Problems Son    • Testicular cancer Son 39   • Thyroid cancer Son 45   • Colon cancer Maternal Aunt    • Colon cancer Maternal Aunt    • Breast cancer Other 48        kaylee's daughter   • Alcohol abuse Neg Hx    • Substance Abuse Neg Hx    • Mental illness Neg Hx    • Depression Neg Hx      I have reviewed and agree with the history as documented.     E-Cigarette/Vaping   • E-Cigarette Use Never User      E-Cigarette/Vaping Substances   • Nicotine No    • THC No    • CBD No    • Flavoring No    • Other No    • Unknown No      Social History     Tobacco Use   • Smoking status: Former     Packs/day: 1.00     Years: 25.00     Total pack years: 25.00     Types: Cigarettes     Quit date: 1980     Years since quittin.0   • Smokeless tobacco: Never   • Tobacco comments:     Quit over 30 years ago; quit age 41   Vaping Use   • Vaping Use: Never used   Substance Use Topics   • Alcohol use: Not Currently   • Drug use: No       Review of Systems   Constitutional: Negative. Negative for chills and fever. HENT: Negative. Negative for ear pain and sore throat. Eyes: Negative. Negative for pain and visual disturbance. Respiratory: Negative for cough and shortness of breath. Cardiovascular: Negative for chest pain and palpitations. Gastrointestinal: Negative for abdominal pain and vomiting. Genitourinary: Negative for dysuria and hematuria. Musculoskeletal: Negative for arthralgias and back pain. Skin: Positive for wound. Negative for color change and rash. Neurological: Negative for seizures and syncope. Hematological: Negative. Psychiatric/Behavioral: Negative. All other systems reviewed and are negative. Physical Exam  Physical Exam  Vitals and nursing note reviewed. Constitutional:       General: She is not in acute distress. Appearance: She is well-developed. She is obese. She is not ill-appearing or diaphoretic. HENT:      Head: Normocephalic and atraumatic. Nose: Nose normal.      Mouth/Throat:      Mouth: Mucous membranes are moist.   Eyes:      General: No scleral icterus. Conjunctiva/sclera: Conjunctivae normal.      Pupils: Pupils are equal, round, and reactive to light. Cardiovascular:      Rate and Rhythm: Normal rate and regular rhythm. Pulses: Normal pulses. Heart sounds: Normal heart sounds. No murmur heard. Pulmonary:      Effort: Pulmonary effort is normal. No respiratory distress. Breath sounds: Normal breath sounds. Abdominal:      Palpations: Abdomen is soft. Tenderness:  There is no abdominal tenderness. Musculoskeletal:         General: No swelling. Normal range of motion. Cervical back: Normal range of motion and neck supple. Right lower leg: No edema. Left lower leg: No edema. Skin:     General: Skin is warm and dry. Capillary Refill: Capillary refill takes less than 2 seconds. Neurological:      Mental Status: She is alert. Psychiatric:         Mood and Affect: Mood normal.        Media Information      Document Information      Vital Signs  ED Triage Vitals [09/15/23 1441]   Temperature Pulse Respirations Blood Pressure SpO2   98.1 °F (36.7 °C) 100 18 135/80 96 %      Temp src Heart Rate Source Patient Position - Orthostatic VS BP Location FiO2 (%)   -- -- -- -- --      Pain Score       2           Vitals:    09/15/23 1441   BP: 135/80   Pulse: 100         Visual Acuity      ED Medications  Medications - No data to display    Diagnostic Studies  Results Reviewed     None                 No orders to display              Procedures  Procedures         ED Course                               SBIRT 20yo+    Flowsheet Row Most Recent Value   Initial Alcohol Screen: US AUDIT-C     1. How often do you have a drink containing alcohol? 0 Filed at: 09/15/2023 1441   2. How many drinks containing alcohol do you have on a typical day you are drinking? 0 Filed at: 09/15/2023 1441   3a. Male UNDER 65: How often do you have five or more drinks on one occasion? 0 Filed at: 09/15/2023 1441   3b. FEMALE Any Age, or MALE 65+: How often do you have 4 or more drinks on one occassion? 0 Filed at: 09/15/2023 1441   Audit-C Score 0 Filed at: 09/15/2023 1441   KIM: How many times in the past year have you. .. Used an illegal drug or used a prescription medication for non-medical reasons?  Never Filed at: 09/15/2023 1441                    Medical Decision Making  Non-healing wound of left lower extremity: acute illness or injury     Details: Nonhealing wound of left lower extremity  Discussed with wound care who recommended Allevyn dressing and follow-up with wound care center  They will reach out to patient to schedule appointment next week  Wound is mildly erythematous which could represent early infection-wound otherwise appears clean with fibrinous material  Patient discharged with Keflex  Patient educated on red flag symptoms of necessitate return to the ED  Risk  Prescription drug management. Disposition  Final diagnoses:   Non-healing wound of left lower extremity     Time reflects when diagnosis was documented in both MDM as applicable and the Disposition within this note     Time User Action Codes Description Comment    9/15/2023  3:17 PM Alan Carrillo Add [S81.802A] Non-healing wound of left lower extremity       ED Disposition     ED Disposition   Discharge    Condition   Stable    Date/Time   Fri Sep 15, 2023  3:19 PM    Comment   Lillian Ortiz discharge to home/self care.                Follow-up Information     Follow up With Specialties Details Why Contact Info Additional Information    Awilda Holcomb MD Internal Medicine Go to  As needed [facility]  [address] [address]  [phone]       [address] Schedule an appointment as soon as possible for a visit   [address] [place] [address] [address] [address]  [phone] [address] [facility], [address] [place] [address], [address] [facility] [address]          Discharge Medication List as of 9/15/2023  3:20 PM      START taking these medications    Details   clindamycin (CLEOCIN) 300 MG capsule Take 1 capsule (300 mg total) by mouth 4 (four) times a day for 7 days, Starting Fri 9/15/2023, Until Fri 9/22/2023, Normal         CONTINUE these medications which have NOT CHANGED    Details   acetaminophen (TYLENOL) 325 mg tablet Take 3 tablets (975 mg total) by mouth every 8 (eight) hours, Starting Tue 3/29/2022, No Print Calcium Acetate, Phos Binder, (CALCIUM ACETATE PO) Take by mouth daily  , Historical Med      clobetasol (TEMOVATE) 0.05 % ointment Apply once weekly to the affected area, Normal      famotidine (PEPCID) 40 MG tablet TAKE 1 TABLET BY MOUTH EVERY DAY, Normal      fluticasone (FLONASE) 50 mcg/act nasal spray SPRAY 1 SPRAY INTO EACH NOSTRIL EVERY DAY, Normal      gabapentin (NEURONTIN) 800 mg tablet TAKE 1 TABLET BY MOUTH FOUR TIMES A DAY, Normal      loratadine (CLARITIN) 10 mg tablet Take 10 mg by mouth daily, Historical Med      magnesium 30 MG tablet Take 30 mg by mouth in the morning, Historical Med      metoprolol tartrate (LOPRESSOR) 50 mg tablet TAKE 1 AND 1/2 TABLETS BY MOUTH 2 TIMES A DAY, Normal      multivitamin (THERAGRAN) TABS Take 1 tablet by mouth daily, Historical Med      pramipexole (MIRAPEX) 0.5 mg tablet TAKE 2 FULL TABLETS TWICE A DAY AT 5:00 P. M.  AND 10:00 P.M., Normal      rivaroxaban (Xarelto) 20 mg tablet Take 1 tablet (20 mg total) by mouth daily with breakfast, Starting Fri 7/28/2023, Normal      torsemide (DEMADEX) 20 mg tablet Take 1 tablet (20 mg total) by mouth daily, Starting Wed 11/23/2022, Normal                 PDMP Review       Value Time User    PDMP Reviewed  Yes 4/22/2022 10:26 AM Wanda Alvarenga PA-C          ED Provider  Electronically Signed by           Jessi Almonte PA-C  09/15/23 3546

## 2023-09-15 NOTE — TELEPHONE ENCOUNTER
Caller: Peter/RN/Marietta VALLES    Doctor/Office: Dr. Kenya Gaona    #: [phone]    Escalation: Care/said Dr Armen Gaona was treating this pt/everything says Dr Alex Alanis me to inform Dr Armen Gaona that her wound is red and warm to touch and pt says burning feeling-I told him to call Dr Richie Fry as Dr. Armen Gaona is OOO until Monday/but not her Dr as far as I can see [facility].

## 2023-09-18 ENCOUNTER — TELEPHONE (OUTPATIENT)
Dept: HEMATOLOGY ONCOLOGY | Facility: CLINIC | Age: 83
End: 2023-09-18

## 2023-09-18 ENCOUNTER — TELEPHONE (OUTPATIENT)
Dept: GYNECOLOGIC ONCOLOGY | Facility: CLINIC | Age: 83
End: 2023-09-18

## 2023-09-18 NOTE — TELEPHONE ENCOUNTER
Appointment Change  Cancel, Reschedule, Change to Virtual      Who are you speaking with? Patient   If it is not the patient, is the caller listed on the communication consent form? N/A   Which provider is the appointment scheduled with? Shorty Kulkarni PA-C   When was the original appointment scheduled? Please list date and time                 09/22/2023 @10AM    At which location is the appointment scheduled to take place? [facility]   Was the appointment rescheduled? Was the appointment changed from an in person visit to a virtual visit? If so, please list the details of the change. Yes, 04/12/2024 @11:30AM    What is the reason for the appointment change? Patient has a wound and would like to push her appt off.

## 2023-09-18 NOTE — TELEPHONE ENCOUNTER
Spoke with patient about appointment change. Per Angela Amaro, its ok if she is not having any symptoms. Per Patient, she is not having anything symptoms. Patient reported that she has wound on her leg that may take up to 4 months to heal. Also Patient stated that she was suppose to get a valve replacement surgery, but that got pushed due to the infection in her leg. Patient stated that she pushed the appointment out due to everything going on right now. Informed Patient that she needs to call if any type of symptoms come up and Patient gave her verbal understanding.

## 2023-09-19 ENCOUNTER — OFFICE VISIT (OUTPATIENT)
Dept: WOUND CARE | Facility: HOSPITAL | Age: 83
End: 2023-09-19
Payer: MEDICARE

## 2023-09-19 VITALS
DIASTOLIC BLOOD PRESSURE: 73 MMHG | HEART RATE: 89 BPM | RESPIRATION RATE: 20 BRPM | SYSTOLIC BLOOD PRESSURE: 114 MMHG | TEMPERATURE: 97.4 F | HEIGHT: 64 IN | WEIGHT: 230 LBS | BODY MASS INDEX: 39.27 KG/M2

## 2023-09-19 DIAGNOSIS — I50.32 CHRONIC DIASTOLIC CHF (CONGESTIVE HEART FAILURE) (HCC): ICD-10-CM

## 2023-09-19 DIAGNOSIS — Z79.01 CHRONIC ANTICOAGULATION: ICD-10-CM

## 2023-09-19 DIAGNOSIS — I89.0 LYMPHEDEMA OF BOTH LOWER EXTREMITIES: ICD-10-CM

## 2023-09-19 DIAGNOSIS — S81.801A TRAUMATIC OPEN WOUND OF RIGHT LOWER LEG, INITIAL ENCOUNTER: Primary | ICD-10-CM

## 2023-09-19 DIAGNOSIS — I87.311 CHRONIC VENOUS HYPERTENSION (IDIOPATHIC) WITH ULCER OF RIGHT LOWER EXTREMITY (CODE) (HCC): ICD-10-CM

## 2023-09-19 DIAGNOSIS — R60.9 LIPEDEMA: ICD-10-CM

## 2023-09-19 DIAGNOSIS — I70.209 PERIPHERAL ARTERIOSCLEROSIS (HCC): ICD-10-CM

## 2023-09-19 PROCEDURE — 99213 OFFICE O/P EST LOW 20 MIN: CPT | Performed by: STUDENT IN AN ORGANIZED HEALTH CARE EDUCATION/TRAINING PROGRAM

## 2023-09-19 PROCEDURE — 97597 DBRDMT OPN WND 1ST 20 CM/<: CPT | Performed by: STUDENT IN AN ORGANIZED HEALTH CARE EDUCATION/TRAINING PROGRAM

## 2023-09-19 RX ORDER — LIDOCAINE HYDROCHLORIDE 40 MG/ML
5 SOLUTION TOPICAL ONCE
Status: COMPLETED | OUTPATIENT
Start: 2023-09-19 | End: 2023-09-19

## 2023-09-19 RX ADMIN — LIDOCAINE HYDROCHLORIDE 5 ML: 40 SOLUTION TOPICAL at 10:30

## 2023-09-19 NOTE — PATIENT INSTRUCTIONS
Orders Placed This Encounter   Procedures    Wound cleansing and dressings     Wash your hands with soap and water. Remove old dressing, discard into plastic bag and place in trash. Cleanse the wound with mild soap and water prior to applying a clean dressing. Do not use tissue or cotton balls. Do not scrub the wound. Pat dry using gauze. Shower yes prior to dressing change. Apply moisturizer to skin surrounding wound  Apply polymem max AG to the left leg wound. Cover with gauze  Secure with oscar and tape  Change dressing 3x week     Standing Status:   Future     Standing Expiration Date:   9/19/2024    Wound compression and edema control     Elastic Tubular Stocking size F    Tubular elastic bandage: Apply from base of toes to behind the knee. Apply in AM, may remove for sleep. Avoid prolonged standing in one place. Elevate leg(s) above the level of the heart when sitting or as much as possible. Standing Status:   Future     Standing Expiration Date:   9/19/2024    Wound home care     Continue home care for wound assess and care. Wound care dressing changes 3x week as ordered     Standing Status:   Future     Standing Expiration Date:   9/19/2024    Wound miscellaneous orders     Miscellaneous Instructions     Increase the dietary protein in your diet. Standing Status:   Future     Standing Expiration Date:   9/19/2024    No orders of the defined types were placed in this encounter.

## 2023-09-19 NOTE — PROGRESS NOTES
Patient ID: Marianna Gaytan is a 82 y.o. female Date of Birth 1940     Chief Complaint  Chief Complaint   Patient presents with   • New Patient Visit     Left leg wound       Allergies  Latex, Cephalexin, Duloxetine hcl, Erythromycin, Levofloxacin, Penicillins, Savella [milnacipran], and Sulfa antibiotics    Assessment:     Diagnoses and all orders for this visit:    Traumatic open wound of right lower leg, initial encounter  -     Wound cleansing and dressings; Future  -     Wound compression and edema control; Future  -     Wound home care; Future  -     Wound miscellaneous orders; Future  -     lidocaine (XYLOCAINE) 4 % topical solution 5 mL  -     Debridement    Lymphedema of both lower extremities    Lipedema    Chronic venous hypertension (idiopathic) with ulcer of right lower extremity (CODE) ([facility])    Chronic anticoagulation    Chronic diastolic CHF (congestive heart failure) ([facility])    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[address])              Debridement   Wound 09/19/23 Traumatic Leg Left;Lateral    Universal Protocol:  Consent: Verbal consent obtained. Risks and benefits: risks, benefits and alternatives were discussed  Consent given by: patient  Time out: Immediately prior to procedure a "time out" was called to verify the correct patient, procedure, equipment, support staff and site/side marked as required.   Patient identity confirmed: verbally with patient      Performed by: physician  Debridement type: selective  Pain control: lidocaine 4%  Pre-debridement measurements  Length (cm): 4.5  Width (cm): 3  Depth (cm): 0.2  Surface Area (cm^2): 13.5  Volume (cm^3): 2.7    Post-debridement measurements  Length (cm): 4.5  Width (cm): 3  Depth (cm): 0.2  Percent debrided: 100%  Surface Area (cm^2): 13.5  Area debrided (cm^2): 13.5  Volume (cm^3): 2.7  Devitalized tissue debrided: biofilm, exudate, fibrin and slough  Instrument(s) utilized: curette  Bleeding: small  Hemostasis obtained with: pressure  Procedural pain (0-10): 1  Post-procedural pain: 0   Response to treatment: procedure was tolerated well          Plan:  •  It was a pleasure to see Ian Colby for wound care consult today  • Selective debridement performed today as above  • Start plan of care as noted below with polymem with silver, spandigrip for gentle compression  • VNA coming to home 3x/week  •  A1C results reviewed with the patient today. • No signs or symptoms of infection today. Patient understands that if any signs of infection start (such as increased redness, drainage, pain, fever, chills, diaphoresis), they should call our office or proceed to the ER or Urgent Care. • Patient should continue a high protein diet to facilitate wound healing  • Patient is advised to not submerge wound or leave wound open to air. • Follow up in 1 weeks  • Given the multi-factorial nature of wound care, additional time was taken to review patient's treatment plan with other specialties and most recent pertinent lab work and imaging. • All plans of care discussed with patient at bedside who verbalized understanding with treatment plan. Wound 09/19/23 Traumatic Leg Left;Lateral (Active)   Wound Image Images linked 09/19/23 0939   Wound Description Pink;Yellow;Slough; Hypergranulation 09/19/23 0938   Dorys-wound Assessment Intact; Hyperpigmented 09/19/23 0938   Wound Length (cm) 4.5 cm 09/19/23 0938   Wound Width (cm) 3 cm 09/19/23 0938   Wound Depth (cm) 0.2 cm 09/19/23 0938   Wound Surface Area (cm^2) 13.5 cm^2 09/19/23 0938   Wound Volume (cm^3) 2.7 cm^3 09/19/23 0938   Calculated Wound Volume (cm^3) 2.7 cm^3 09/19/23 0938   Drainage Amount Moderate 09/19/23 0938   Drainage Description Serosanguineous; Yellow 09/19/23 0938   Non-staged Wound Description Full thickness 09/19/23 0938       Wound 09/19/23 Traumatic Leg Left;Lateral (Active)   Date First Assessed/Time First Assessed: 09/19/23 0938   Primary Wound Type: Traumatic  Location: Leg  Wound Location Orientation: Left;Lateral       [REMOVED] Wound 08/17/20 Knee Left (Removed)   Resolved Date: 09/19/23  Date First Assessed/Time First Assessed: 08/17/20 0936   Location: Knee  Wound Location Orientation: Left  Wound Description (Comments): darryl stocking  Incision's 1st Dressing: ADHESIVE SKIN CLOSR DERMABOND PRINEO, DRESSING MEP. .. [REMOVED] Wound 03/28/22 Knee Right (Removed)   Resolved Date: 09/19/23  Date First Assessed/Time First Assessed: 03/28/22 0854   Location: Knee  Wound Location Orientation: Right  Wound Description (Comments): DARRYL stockings applied to bilateral lower legs  Incision's 1st Dressing: ADHESIVE SKIN HI. .. [REMOVED] Wound 04/11/22 Surgical Knee Right (Removed)   Resolved Date: 09/19/23  Date First Assessed/Time First Assessed: 04/11/22 2000   Primary Wound Type: Surgical  Location: Knee  Wound Location Orientation: Right  Wound Outcome: Unknown (No longer present)       [REMOVED] Wound 09/05/23 Catheter entry/exit site Wrist Right (Removed)   Resolved Date: 09/19/23  Date First Assessed/Time First Assessed: 09/05/23 0921   Traumatic Wound Type: Catheter entry/exit site  Location: Wrist  Wound Location Orientation: Right  Wound Outcome: Unknown (No longer present)       [REMOVED] Wound 09/05/23 Skin Tear Pretibial Left; Outer (Removed)   Resolved Date: 09/19/23  Date First Assessed/Time First Assessed: 09/05/23 0730   Present on Original Admission: Yes  Primary Wound Type: Skin Tear  Location: Pretibial  Wound Location Orientation: Left; Outer  Wound Outcome: (c) Other (Comment)       Subjective:      .    9/19/23: Georgette Pickard is a pleasant 79-year-old female with a past medical history of type 2 diabetes mellitus most recent A1c 5.8% 1 month ago, diabetic polyneuropathy, atrial  fibrillation on Xarelto, obesity, history of heart failure with most recent echocardiogram showing normal function here today for initial wound care consult.   Per chart review patient sustained the wound of her left lower extremity on August 21, 2023 when she closed a car door on her leg. She went the next day to her PCP and was advised to apply warm compresses. She has been seen in the ED multiple times for wound checks. Initially she has been put on clindamycin and given Tdap vaccination. Wound culture taken which grew 1+ staph coag negative, and later wound culture grew 1+ Staph epidermidis during inpatient stay. She was admitted for 3 days from 9/5 to 9/8/2023 at [facility] and underwent bedside I&D with placement of wound VAC by podiatry. Arterial duplex showed no significant arterial disease however PVR tracings were dampened. Great toe pressure  within healing range. Blood cultures were negative. She is instructed to follow-up outpatient with podiatry and VNA was set up for patient. Recently patient was seen again in ER on September 15, 2023 when there was concern again for infection of the wound. She was prescribed clindamycin and referred to wound management. Referral was placed by physician assistant Angie Ortiz. Today patient continues to take clindamycin as directed. Denies any local or systemic signs of infection including fever chills diaphoresis. 9/5/23: LEADs  RIGHT LOWER LIMB:  No evidence of significant lower extremity arterial occlusive disease. Ankle/Brachial index: 2.11 which is unreliable due to non compressible vessels. PVR/ PPG tracings are dampened. Metatarsal pressure of 109 mmHg  Great toe pressure of 81 mmHg, within the healing range. LEFT LOWER LIMB:  No evidence of significant lower extremity arterial occlusive disease. Ankle/Brachial index: 2.11 which is unreliable due to non compressible vessels. PVR/ PPG tracings are dampened. Metatarsal pressure of 152 mmHg  Great toe pressure of 92 mmHg, within the healing range.       The following portions of the patient's history were reviewed and updated as appropriate: allergies, current medications, past family history, past medical history, past social history, past surgical history and problem list.    Review of Systems   Constitutional: Negative for chills, diaphoresis and fever. Skin: Positive for wound. All other systems reviewed and are negative. Objective:       Wound 09/19/23 Traumatic Leg Left;Lateral (Active)   Wound Image Images linked 09/19/23 0939   Wound Description Pink;Yellow;Slough; Hypergranulation 09/19/23 0938   Dorys-wound Assessment Intact; Hyperpigmented 09/19/23 0938   Wound Length (cm) 4.5 cm 09/19/23 0938   Wound Width (cm) 3 cm 09/19/23 0938   Wound Depth (cm) 0.2 cm 09/19/23 0938   Wound Surface Area (cm^2) 13.5 cm^2 09/19/23 0938   Wound Volume (cm^3) 2.7 cm^3 09/19/23 0938   Calculated Wound Volume (cm^3) 2.7 cm^3 09/19/23 0938   Drainage Amount Moderate 09/19/23 0938   Drainage Description Serosanguineous; Yellow 09/19/23 0938   Non-staged Wound Description Full thickness 09/19/23 0938       /73   Pulse 89   Temp (!) 97.4 °F (36.3 °C) (Temporal)   Resp 20   Ht 5' 4" (1.626 m)   Wt 104 kg (230 lb)   BMI 39.48 kg/m²     Physical Exam  Vitals reviewed. Constitutional:       Appearance: Normal appearance. She is obese. HENT:      Head: Normocephalic and atraumatic. Mouth/Throat:      Mouth: Mucous membranes are moist.   Eyes:      Extraocular Movements: Extraocular movements intact. Pulmonary:      Effort: Pulmonary effort is normal.   Musculoskeletal:      Right lower leg: Edema present. Left lower leg: Edema present. Skin:     Comments: Hyperpigmentation of bilateral lower extremities consistent with chronic venous stasis disease. Anterior left lower extremity distal wound with heavy fibrin deposition. Serosanguineous exudate. Mild periwound maceration. Neurological:      Mental Status: She is alert.    Psychiatric:         Mood and Affect: Mood normal.         Behavior: Behavior normal.                   Wound Instructions:  Orders Placed This Encounter   Procedures   • Wound cleansing and dressings     Wash your hands with soap and water. Remove old dressing, discard into plastic bag and place in trash. Cleanse the wound with mild soap and water prior to applying a clean dressing. Do not use tissue or cotton balls. Do not scrub the wound. Pat dry using gauze. Shower yes prior to dressing change. Apply moisturizer to skin surrounding wound  Apply polymem max AG to the left leg wound. Cover with gauze  Secure with oscar and tape  Change dressing 3x week     Standing Status:   Future     Standing Expiration Date:   9/19/2024   • Wound compression and edema control     Elastic Tubular Stocking size F    Tubular elastic bandage: Apply from base of toes to behind the knee. Apply in AM, may remove for sleep. Avoid prolonged standing in one place. Elevate leg(s) above the level of the heart when sitting or as much as possible. Standing Status:   Future     Standing Expiration Date:   9/19/2024   • Wound home care     Continue home care for wound assess and care. Wound care dressing changes 3x week as ordered     Standing Status:   Future     Standing Expiration Date:   9/19/2024   • Wound miscellaneous orders     Miscellaneous Instructions     Increase the dietary protein in your diet. Standing Status:   Future     Standing Expiration Date:   9/19/2024   • Debridement     This order was created via procedure documentation        Diagnosis ICD-10-CM Associated Orders   1. Traumatic open wound of right lower leg, initial encounter  S81.801A Wound cleansing and dressings     Wound compression and edema control     Wound home care     Wound miscellaneous orders     lidocaine (XYLOCAINE) 4 % topical solution 5 mL     Debridement      2. Lymphedema of both lower extremities  I89.0       3. Lipedema  R60.9       4.  Chronic venous hypertension (idiopathic) with ulcer of right lower extremity (CODE) ([facility])  I87.311 5. Chronic anticoagulation  Z79.01       6. Chronic diastolic CHF (congestive heart failure) ([facility])  I50.32       7. 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[address])  I70.209         --  Yen Foster MD    "This note has been constructed using a voice recognition system. Therefore there may be syntax, spelling, and/or grammatical errors. Occasional wrong word or "sound alike" substitutions may have occurred due to the inherent limitations of voice recognition software. Read the chart carefully and recognize, using context, where substitutions have occurred.  Please call if you have any questions."

## 2023-09-19 NOTE — LETTER
[facility]  [address]  [address] [facility] [address]  Phone#  [phone]  Fax#  [phone]    Patient:  Karen Downing  YOB: 1940  Phone:  [phone]  Date of Visit:  9/19/2023    Orders Placed This Encounter   Procedures   • Wound cleansing and dressings     Wash your hands with soap and water. Remove old dressing, discard into plastic bag and place in trash. Cleanse the wound with mild soap and water prior to applying a clean dressing. Do not use tissue or cotton balls. Do not scrub the wound. Pat dry using gauze. Shower yes prior to dressing change. Apply moisturizer to skin surrounding wound  Apply polymem max AG to the left leg wound. Cover with gauze  Secure with oscar and tape  Change dressing 3x week     Standing Status:   Future     Standing Expiration Date:   9/19/2024   • Wound compression and edema control     Elastic Tubular Stocking size F    Tubular elastic bandage: Apply from base of toes to behind the knee. Apply in AM, may remove for sleep. Avoid prolonged standing in one place. Elevate leg(s) above the level of the heart when sitting or as much as possible. Standing Status:   Future     Standing Expiration Date:   9/19/2024   • Wound home care     Continue home care for wound assess and care. Wound care dressing changes 3x week as ordered     Standing Status:   Future     Standing Expiration Date:   9/19/2024   • Wound miscellaneous orders     Miscellaneous Instructions     Increase the dietary protein in your diet.      Standing Status:   Future     Standing Expiration Date:   9/19/2024   • Debridement     This order was created via procedure documentation         Electronically signed by Haven Abdullahi MD

## 2023-09-21 ENCOUNTER — OFFICE VISIT (OUTPATIENT)
Dept: INTERNAL MEDICINE CLINIC | Facility: CLINIC | Age: 83
End: 2023-09-21
Payer: MEDICARE

## 2023-09-21 VITALS
TEMPERATURE: 96.5 F | HEIGHT: 64 IN | WEIGHT: 229 LBS | OXYGEN SATURATION: 96 % | SYSTOLIC BLOOD PRESSURE: 128 MMHG | HEART RATE: 95 BPM | DIASTOLIC BLOOD PRESSURE: 78 MMHG | BODY MASS INDEX: 39.09 KG/M2

## 2023-09-21 DIAGNOSIS — J30.2 SEASONAL ALLERGIES: ICD-10-CM

## 2023-09-21 DIAGNOSIS — M25.512 CHRONIC LEFT SHOULDER PAIN: ICD-10-CM

## 2023-09-21 DIAGNOSIS — K29.60 REFLUX GASTRITIS: ICD-10-CM

## 2023-09-21 DIAGNOSIS — S81.802A NON-HEALING WOUND OF LEFT LOWER EXTREMITY: Primary | ICD-10-CM

## 2023-09-21 DIAGNOSIS — G89.29 CHRONIC LEFT SHOULDER PAIN: ICD-10-CM

## 2023-09-21 DIAGNOSIS — I35.0 AORTIC STENOSIS, SEVERE: ICD-10-CM

## 2023-09-21 DIAGNOSIS — K21.9 GASTROESOPHAGEAL REFLUX DISEASE, UNSPECIFIED WHETHER ESOPHAGITIS PRESENT: ICD-10-CM

## 2023-09-21 DIAGNOSIS — I48.20 CHRONIC ATRIAL FIBRILLATION (HCC): ICD-10-CM

## 2023-09-21 PROCEDURE — 99495 TRANSJ CARE MGMT MOD F2F 14D: CPT | Performed by: INTERNAL MEDICINE

## 2023-09-21 RX ORDER — PANTOPRAZOLE SODIUM 20 MG/1
20 TABLET, DELAYED RELEASE ORAL DAILY
Qty: 30 TABLET | Refills: 0 | Status: SHIPPED | OUTPATIENT
Start: 2023-09-21 | End: 2023-09-27 | Stop reason: ALTCHOICE

## 2023-09-21 NOTE — PATIENT INSTRUCTIONS
Use stephanie/coconut/ baby oil outside the ear, 1  to 2 times a day. Take pantoprazole every morning for 2 weeks. If no improvement, let me know. Continue taking famotidine every night. Continue using Flonase twice a day.

## 2023-09-21 NOTE — PROGRESS NOTES
Assessment & Plan     1. Non-healing wound of left lower extremity  Assessment & Plan:  S/p debridement, follow up at wound center as scheduled. VNA tomorrow. 2. Aortic stenosis, severe  Assessment & Plan:  Reviewed recent cardiac cath. Follow up with cardiology. 3. Chronic atrial fibrillation [facility])  Assessment & Plan:  Denies any palpitations. On Xarelto, metoprolol. 4. Gastroesophageal reflux disease, unspecified whether esophagitis present  Assessment & Plan:  Add pantoprazole in AM, continue famotidine in PM.  If cough does not improve, agrees to try steroid inhaler. 5. Chronic left shoulder pain  Assessment & Plan:  Reviewed normal x-ray. Suspect tendonitis. Follow up with Ortho. 6. Seasonal allergies  Assessment & Plan:  On daily antihistamine, Flonase bid. 7. Reflux gastritis  -     pantoprazole (PROTONIX) 20 mg tablet; Take 1 tablet (20 mg total) by mouth daily         Subjective     Transitional Care Management Review:   Ben Parmar is a 82 y.o. female here for TCM follow up. During the TCM phone call patient stated:  TCM Call     Date and time call was made  9/11/2023 10:46 AM    Hospital care reviewed  Records reviewed    Patient was hospitialized at  [address] [place] [address]    Date of Admission  09/09/23    Date of discharge  09/09/23    Diagnosis  Non-healing wound of left lower extremity    Disposition  Home    Were the patients medications reviewed and updated  Yes    Current Symptoms  None      TCM Call     Post hospital issues  None    Should patient be enrolled in anticoag monitoring? Yes    Scheduled for follow up? Yes    Did you obtain your prescribed medications  Yes    Do you need help managing your prescriptions or medications  No    Is transportation to your appointment needed  No    I have advised the patient to call PCP with any new or worsening symptoms  cjs    Living Arrangements  Spouse or Significiant other    Support System  Family;  Spouse The type of support provided  None    Do you have social support  Yes, as much as I need    Are you recieving any outpatient services  No    Are you recieving home care services  No    Are you using any community resources  No    Counseling  Patient        She just had her wound cleaned yesterday. She is glad that she is now being seen at the wound center. She reports that the wound larger since she was last here prompting a hospital admission. She continues to experience intermittent left shoulder pain. No known injury. She feels she is unable to lift overhead without any pain. She has been clearing her throat frequently the past few weeks. She uses Flonase twice a day. She takes famotidine every night. She denies any cough or wheezing, usually multiple times a day. She reports no symptoms at night. She also complains of itching in her right ear. She feels there is something inside it. She had a cardiac cath recently. She is anxious to do the surgery. She reports no recent chest pain or pressure, no palpitations. Review of Systems   Constitutional: Negative for activity change and appetite change. HENT: Positive for postnasal drip and rhinorrhea. Negative for ear discharge, ear pain, sneezing, sore throat, trouble swallowing and voice change. Respiratory: Negative for cough, shortness of breath and wheezing. Cardiovascular: Negative for chest pain. Musculoskeletal: Positive for arthralgias. Skin: Positive for wound. Objective     /78   Pulse 95   Temp (!) 96.5 °F (35.8 °C)   Ht 5' 4" (1.626 m)   Wt 104 kg (229 lb)   SpO2 96%   BMI 39.31 kg/m²      Physical Exam  Vitals and nursing note reviewed. Constitutional:       Appearance: Normal appearance. HENT:      Head: Normocephalic and atraumatic.       Right Ear: Tympanic membrane, ear canal and external ear normal.      Left Ear: Tympanic membrane, ear canal and external ear normal.      Nose: Congestion present. No rhinorrhea. Mouth/Throat:      Mouth: Mucous membranes are moist.   Eyes:      Pupils: Pupils are equal, round, and reactive to light. Cardiovascular:      Rate and Rhythm: Normal rate and regular rhythm. Pulmonary:      Effort: Pulmonary effort is normal. No respiratory distress. Breath sounds: Normal breath sounds. Musculoskeletal:      Left shoulder: No tenderness or bony tenderness. Decreased range of motion. Skin:         Neurological:      General: No focal deficit present. Mental Status: She is alert and oriented to person, place, and time. Psychiatric:         Mood and Affect: Mood normal.         Behavior: Behavior normal.       Medications have been reviewed by provider in current encounter    Casey Esparza MD     Labs & imaging results reviewed with patient.

## 2023-09-21 NOTE — ASSESSMENT & PLAN NOTE
Add pantoprazole in AM, continue famotidine in PM.  If cough does not improve, agrees to try steroid inhaler.

## 2023-09-25 NOTE — OCCUPATIONAL THERAPY NOTE
Notified pt of STAT breast cancer genetic testing results from [facility]- NEGATIVE for mutation. Waiting for the multigene panel to result and will call pt with those results as soon as received. Dr. Josue notified and Minoo Coleman RN notified.     Occupational Therapy Evaluation       08/18/20 0947   Note Type   Note type Eval/Treat   Restrictions/Precautions   Weight Bearing Precautions Per Order Yes   LLE Weight Bearing Per Order WBAT   Other Precautions Chair Alarm; Bed Alarm; Fall Risk;Pain   Pain Assessment   Pain Assessment Tool 0-10   Pain Score 8   Pain Location/Orientation Orientation: Left; Location: Knee   Home Living   Type of [address] [place] [address] Two level; Able to live on main level with bedroom/bathroom;1/2 bath on main level;Stairs to enter with rails  (3 BREEZY)   Bathroom Shower/Tub Tub/shower unit   Bathroom Toilet Standard   Bathroom Equipment Commode   Home Equipment Walker;Cane;Reacher   Prior Function   Level of [place] Independent with ADLs and functional mobility   [facility] From Family   ADL Assistance Independent   IADLs Independent   Comments Patient reports being independent in all ADLs and iADLs without AD, was using cane occassionally right up to surgery due to increased pain   ADL   Eating Assistance 7  Independent   Grooming Assistance 5  Supervision/Setup   UB Bathing Assistance 5  Supervision/Setup   LB Bathing Assistance 2  Maximal Assistance   UB Dressing Assistance 5  Supervision/Setup   LB Dressing Assistance 2  Maximal [address]  3  Moderate Assistance   Bed Mobility   Supine to Sit 3  Moderate assistance   Additional items Assist x 1; Increased time required;LE management   Transfers   Sit to Stand 3  Moderate assistance   Additional items Assist x 1; Increased time required  (Limited by pain)   Stand to Sit 4  Minimal assistance   Additional items Assist x 1;Verbal cues   Stand pivot 3  Moderate assistance   Additional items Assist x 1;Verbal cues; Increased time required  (RW)   Functional Mobility   Functional Mobility 3  Moderate assistance   Additional Comments Patient ambulated few steps to [facility] with RW and mod assist, increased difficulty WBing through LLE due to pain   Balance   Static Sitting Fair +   Dynamic Sitting Fair   Static Standing Fair   Dynamic Standing Poor +   Activity Tolerance   Activity Tolerance Patient limited by pain   Nurse Made Aware Yes, RN Julia   RUTOMMY Assessment   RUE Assessment WFL   LUE Assessment   LUE Assessment WFL   Cognition   Overall Cognitive Status WFL   Arousal/Participation Alert; Cooperative   Attention Within functional limits   Orientation Level Oriented X4   Memory Within functional limits   Following Commands Follows all commands and directions without difficulty   Assessment   Limitation Decreased ADL status; Decreased UE strength;Decreased Safe judgement during ADL;Decreased endurance;Decreased self-care trans;Decreased high-level ADLs   Prognosis Good   Assessment Patient evaluated by Occupational Therapy  Patient admitted s/p elective L TKA  The patient's occupational profile, medical and therapy history includes an extensive additional review of physical, cognitive, or psychosocial history related to current functional performance  Comorbidities affecting functional mobility and ADLS include: paroxysmal Afib, HTN, pacemaker, RLS, neuropathy, Afib, GERD, Carrero's esophagus  Prior to admission, patient was independent with functional mobility without assistive device, independent with ADLS and independent with IADLS  The evaluation identifies the following performance deficits: weakness, impaired balance, decreased endurance, increased fall risk, new onset of impairment of functional mobility, decreased ADLS, decreased IADLS, pain, decreased activity tolerance, decreased safety awareness and decreased strength, that result in activity limitations and/or participation restrictions   This evaluation requires clinical decision making of high complexity, because the patient presents with comorbidites that affect occupational performance and required significant modification of tasks or assistance with consideration of multiple treatment options  The Barthel Index was used as a functional outcome tool presenting with a score of 45, indicating marked limitations of functional mobility and ADLS  Patient will benefit from skilled Occupational Therapy services to address above deficits and facilitate a safe return to prior level of function  Goals   Patient Goals to decrease pain, to go home without help   STG Time Frame 1-3   Short Term Goal  Goals established to promote patient goal of decreasing pain, decreasing level of assist for functional activities:  Patient will increase standing tolerance to 5 minutes during ADL task to decrease assistance level and decrease fall risk; Patient will increase bed mobility to min assist in preparation for ADLS and transfers;  Patient will increase functional mobility to and from bathroom with rolling walker with min assist to increase performance with ADLS and to use a toilet; Patient will tolerate 5 minutes of UE ROM/strengthening to increase general activity tolerance and performance in ADLS/IADLS; Patient will improve functional activity tolerance to 5 minutes of sustained functional tasks to increase participation in basic self-care and decrease assistance level;  Patient will be able to to verbalize understanding and perform energy conservation/proper body mechanics during ADLS and functional mobility at least 75% of the time with minimal cueing to decrease signs of fatigue and increase stamina to return to prior level of function; Patient will increase dynamic sitting balance to fair+ to improve the ability to sit at edge of bed or on a chair for ADLS;  Patient will increase dynamic standing balance to fair- to improve postural stability and decrease fall risk during standing ADLS and transfers   LTG Time Frame 3-7   Long Term Goal Patient will increase standing tolerance to 10 minutes during ADL task to decrease assistance level and decrease fall risk; Patient will increase bed mobility to independent in preparation for ADLS and transfers; Patient will increase functional mobility to and from bathroom with rolling walker independently to increase performance with ADLS and to use a toilet; Patient will tolerate 10 minutes of UE ROM/strengthening to increase general activity tolerance and performance in ADLS/IADLS; Patient will improve functional activity tolerance to 10 minutes of sustained functional tasks to increase participation in basic self-care and decrease assistance level;  Patient will be able to to verbalize understanding and perform energy conservation/proper body mechanics during ADLS and functional mobility at least 90% of the time with nocueing to decrease signs of fatigue and increase stamina to return to prior level of function; Patient will increase static/dynamic sitting balance to good to improve the ability to sit at edge of bed or on a chair for ADLS;  Patient will increase static/dynamic standing balance to fair to improve postural stability and decrease fall risk during standing ADLS and transfers  Functional Transfer Goals   Pt Will Perform All Functional Transfers   (STG min assist, LTG independent)   ADL Goals   Pt Will Perform Grooming   (LTG independent)   Pt Will Perform Bathing With adaptive equipment  (STG min assist, LTG independent)   Pt Will Perform UE Dressing   (LTG independent)   Pt Will Perform LE Dressing With adaptive equipment  (STG mod assist, LTG min assist)   Pt Will Perform Toileting   (STG min assist, LTG independent)   Plan   Treatment Interventions ADL retraining;Functional transfer training;UE strengthening/ROM; Endurance training;Patient/family training;Equipment evaluation/education; Compensatory technique education;Continued evaluation; Energy conservation; Activityengagement   Goal Expiration Date 09/01/20   OT Frequency 5x/wk   Additional Treatment Session   Start Time 0930   End Time 0114   Treatment Assessment Patient performed lower body dressing task while seated in bed  Patient donned underwear and pants with moderate assist, limited by increased pain, standing to pull pants over hips with min assist  Patient completed lower body dressing: doff gown, don bra and overhead shirt with supervision while seated in chair  Patient performed sit to stand from chair x 2 trials, min assist each trial  Attempted increased functional mobility, patient only able to tolerated one step forward/backward with RW and mod assist, increased difficulty bearing weight through LLE due to pain  RN made aware of patient pain levels and effects on participation in functional activities   At end of session patient seated in recliner chair with all needs met   Recommendation   OT Discharge Recommendation Return to previous environment with social support   Barthel Index   Feeding 10   Bathing 0   Grooming Score 0   Dressing Score 5   Bladder Score 10   Bowels Score 10   Toilet Use Score 5   Transfers (Bed/Chair) Score 5   Mobility (Level Surface) Score 0   Stairs Score 0   Barthel Index Score 39   Licensure   [place] License Number  [place], OTR/L [id number]

## 2023-09-26 ENCOUNTER — OFFICE VISIT (OUTPATIENT)
Dept: WOUND CARE | Facility: HOSPITAL | Age: 83
End: 2023-09-26
Payer: MEDICARE

## 2023-09-26 VITALS
DIASTOLIC BLOOD PRESSURE: 72 MMHG | RESPIRATION RATE: 18 BRPM | SYSTOLIC BLOOD PRESSURE: 128 MMHG | HEART RATE: 78 BPM | TEMPERATURE: 97.5 F

## 2023-09-26 DIAGNOSIS — I70.209 PERIPHERAL ARTERIOSCLEROSIS (HCC): ICD-10-CM

## 2023-09-26 DIAGNOSIS — I50.32 CHRONIC DIASTOLIC CHF (CONGESTIVE HEART FAILURE) (HCC): ICD-10-CM

## 2023-09-26 DIAGNOSIS — I87.311 CHRONIC VENOUS HYPERTENSION (IDIOPATHIC) WITH ULCER OF RIGHT LOWER EXTREMITY (CODE) (HCC): ICD-10-CM

## 2023-09-26 DIAGNOSIS — I89.0 LYMPHEDEMA OF BOTH LOWER EXTREMITIES: ICD-10-CM

## 2023-09-26 DIAGNOSIS — S81.801A TRAUMATIC OPEN WOUND OF RIGHT LOWER LEG, INITIAL ENCOUNTER: Primary | ICD-10-CM

## 2023-09-26 DIAGNOSIS — Z79.01 CHRONIC ANTICOAGULATION: ICD-10-CM

## 2023-09-26 DIAGNOSIS — S81.802A TRAUMATIC OPEN WOUND OF LEFT LOWER LEG, INITIAL ENCOUNTER: ICD-10-CM

## 2023-09-26 DIAGNOSIS — R60.9 LIPEDEMA: ICD-10-CM

## 2023-09-26 PROCEDURE — 99213 OFFICE O/P EST LOW 20 MIN: CPT | Performed by: STUDENT IN AN ORGANIZED HEALTH CARE EDUCATION/TRAINING PROGRAM

## 2023-09-26 PROCEDURE — 97597 DBRDMT OPN WND 1ST 20 CM/<: CPT | Performed by: STUDENT IN AN ORGANIZED HEALTH CARE EDUCATION/TRAINING PROGRAM

## 2023-09-26 RX ORDER — LIDOCAINE HYDROCHLORIDE 40 MG/ML
5 SOLUTION TOPICAL ONCE
Status: COMPLETED | OUTPATIENT
Start: 2023-09-26 | End: 2023-09-26

## 2023-09-26 RX ADMIN — LIDOCAINE HYDROCHLORIDE 5 ML: 40 SOLUTION TOPICAL at 09:57

## 2023-09-26 NOTE — PATIENT INSTRUCTIONS
Orders Placed This Encounter   Procedures    Wound cleansing and dressings     Wash your hands with soap and water. Remove old dressing, discard into plastic bag and place in trash. Cleanse the wound with mild soap and water prior to applying a clean dressing. Do not use tissue or cotton balls. Do not scrub the wound. Pat dry using gauze. Shower yes prior to dressing change. Apply moisturizer to skin surrounding wound  Apply polymem max AG to the left leg wound. Cover with gauze  Secure with oscar and tape  Change dressing 3x week           Standing Status:   Future     Standing Expiration Date:   9/26/2024    Wound compression and edema control     Elastic Tubular Stocking size F     Tubular elastic bandage: Apply from base of toes to behind the knee. Apply in AM, may remove for sleep. Avoid prolonged standing in one place. Elevate leg(s) above the level of the heart when sitting or as much as possible. Standing Status:   Future     Standing Expiration Date:   9/26/2024    Wound home care     Continue home care for wound assess and care.   Wound care dressing changes 3x week as ordered     Standing Status:   Future     Standing Expiration Date:   9/26/2024

## 2023-09-26 NOTE — PROGRESS NOTES
Patient ID: Danitza Sampson is a 82 y.o. female Date of Birth 1940     Chief Complaint  Chief Complaint   Patient presents with   • Follow Up Wound Care Visit     BLE wounds       Allergies  Latex, Cephalexin, Duloxetine hcl, Erythromycin, Levofloxacin, Penicillins, Savella [milnacipran], and Sulfa antibiotics    Assessment:     Diagnoses and all orders for this visit:    Traumatic open wound of right lower leg, initial encounter  -     lidocaine (XYLOCAINE) 4 % topical solution 5 mL  -     Wound cleansing and dressings; Future  -     Wound compression and edema control; Future  -     Wound home care; Future  -     Debridement    Traumatic open wound of left lower leg, initial encounter  -     lidocaine (XYLOCAINE) 4 % topical solution 5 mL  -     Wound cleansing and dressings; Future  -     Wound compression and edema control; Future  -     Wound home care; Future  -     Debridement    Lymphedema of both lower extremities    Lipedema    Chronic venous hypertension (idiopathic) with ulcer of right lower extremity (CODE) (HCC)    Chronic anticoagulation    Chronic diastolic CHF (congestive heart failure) ([address])    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[address])    Other orders  -     Cancel: Debridement              Debridement   Wound 09/19/23 Traumatic Leg Left;Lateral    Universal Protocol:  Consent: Verbal consent obtained. Risks and benefits: risks, benefits and alternatives were discussed  Consent given by: patient  Time out: Immediately prior to procedure a "time out" was called to verify the correct patient, procedure, equipment, support staff and site/side marked as required.   Patient identity confirmed: verbally with patient      Performed by: physician  Debridement type: selective  Pain control: lidocaine 4%  Pre-debridement measurements  Length (cm): 3.6  Width (cm): 3.4  Depth (cm): 0.2  Surface Area (cm^2): 12.24  Volume (cm^3): 2.45    Post-debridement measurements  Length (cm): 3.6  Width (cm): 3.4  Depth (cm): 0.2  Percent debrided: 90%  Surface Area (cm^2): 12.24  Area debrided (cm^2): 11.02  Volume (cm^3): 2.45  Devitalized tissue debrided: biofilm and exudate  Instrument(s) utilized: curette  Bleeding: small  Hemostasis obtained with: pressure  Procedural pain (0-10): 1  Post-procedural pain: 0   Response to treatment: procedure was tolerated well    Debridement   Wound 09/26/23 Traumatic Leg Right; Anterior    Universal Protocol:  Consent: Verbal consent obtained. Risks and benefits: risks, benefits and alternatives were discussed  Consent given by: patient  Time out: Immediately prior to procedure a "time out" was called to verify the correct patient, procedure, equipment, support staff and site/side marked as required. Patient identity confirmed: verbally with patient      Performed by: physician  Debridement type: selective  Pain control: lidocaine 4%  Pre-debridement measurements  Length (cm): 0.5  Width (cm): 1  Depth (cm): 0.1  Surface Area (cm^2): 0.5  Volume (cm^3): 0.05    Post-debridement measurements  Length (cm): 0.5  Width (cm): 1  Depth (cm): 0.1  Percent debrided: 90%  Surface Area (cm^2): 0.5  Area debrided (cm^2): 0.45  Volume (cm^3): 0.05  Devitalized tissue debrided: biofilm and exudate  Instrument(s) utilized: curette  Bleeding: small  Hemostasis obtained with: pressure  Procedural pain (0-10): 1  Post-procedural pain: 0   Response to treatment: procedure was tolerated well          Plan:  • It was a pleasure to see Dimitri Points for wound care follow up today  • Selective debridement performed today as above to both wound  • Wound is improving   • Continue plan of care as noted below with polymem to both wounds. Spandigrip for gentle compression  • VNA coming to home  • No signs or symptoms of infection today.  Patient understands that if any signs of infection start (such as increased redness, drainage, pain, fever, chills, diaphoresis), they should call our office or proceed to the ER or Urgent Care. • Patient should continue a high protein diet to facilitate wound healing  • Patient is advised to not submerge wound or leave wound open to air. • Follow up in 2 weeks  • Given the multi-factorial nature of wound care, additional time was taken to review patient's treatment plan with other specialties and most recent pertinent lab work and imaging. • All plans of care discussed with patient at bedside who verbalized understanding with treatment plan. Wound 09/19/23 Traumatic Leg Left;Lateral (Active)   Wound Image Images linked 09/26/23 0947   Wound Description Pink;Yellow;Slough; Hypergranulation 09/26/23 0947   Dorys-wound Assessment Intact; Hyperpigmented; Other (Comment) (rolled edge) 09/26/23 0947   Wound Length (cm) 3.6 cm 09/26/23 0947   Wound Width (cm) 3.4 cm 09/26/23 0947   Wound Depth (cm) 0.2 cm 09/26/23 0947   Wound Surface Area (cm^2) 12.24 cm^2 09/26/23 0947   Wound Volume (cm^3) 2.448 cm^3 09/26/23 0947   Calculated Wound Volume (cm^3) 2.45 cm^3 09/26/23 0947   Change in Wound Size % 9.26 09/26/23 0947   Drainage Amount Moderate 09/26/23 0947   Drainage Description Serosanguineous 09/26/23 0947   Non-staged Wound Description Full thickness 09/26/23 0947   Dressing Status Intact 09/26/23 0947       Wound 09/26/23 Traumatic Leg Right; Anterior (Active)   Wound Image Images linked 09/26/23 0951   Wound Description Granulation tissue;Pink; Other (Comment) (blister) 09/26/23 0951   Dorys-wound Assessment Purple 09/26/23 0951   Wound Length (cm) 0.5 cm 09/26/23 0951   Wound Width (cm) 1 cm 09/26/23 0951   Wound Depth (cm) 0.1 cm 09/26/23 0951   Wound Surface Area (cm^2) 0.5 cm^2 09/26/23 0951   Wound Volume (cm^3) 0.05 cm^3 09/26/23 0951   Calculated Wound Volume (cm^3) 0.05 cm^3 09/26/23 0951   Undermining 0 09/26/23 0951   Undermining is depth extending from 0.2 09/26/23 0951   Drainage Amount Small 09/26/23 0951   Drainage Description Serosanguineous 09/26/23 0951   Non-staged Wound Description Full thickness 09/26/23 0951   Dressing Status Other (Comment) (no dressing on arrival) 09/26/23 0951       Wound 09/19/23 Traumatic Leg Left;Lateral (Active)   Date First Assessed/Time First Assessed: 09/19/23 0938   Primary Wound Type: Traumatic  Location: Leg  Wound Location Orientation: Left;Lateral       Wound 09/26/23 Traumatic Leg Right; Anterior (Active)   Date First Assessed/Time First Assessed: 09/26/23 0950   Primary Wound Type: Traumatic  Location: Leg  Wound Location Orientation: Right; Anterior       [REMOVED] Wound 08/17/20 Knee Left (Removed)   Resolved Date: 09/19/23  Date First Assessed/Time First Assessed: 08/17/20 0936   Location: Knee  Wound Location Orientation: Left  Wound Description (Comments): darryl stocking  Incision's 1st Dressing: ADHESIVE SKIN CLOSR DERMABOND PRINEO, DRESSING MEP. .. [REMOVED] Wound 03/28/22 Knee Right (Removed)   Resolved Date: 09/19/23  Date First Assessed/Time First Assessed: 03/28/22 0854   Location: Knee  Wound Location Orientation: Right  Wound Description (Comments): DARRYL stockings applied to bilateral lower legs  Incision's 1st Dressing: ADHESIVE SKIN HI. .. [REMOVED] Wound 04/11/22 Surgical Knee Right (Removed)   Resolved Date: 09/19/23  Date First Assessed/Time First Assessed: 04/11/22 2000   Primary Wound Type: Surgical  Location: Knee  Wound Location Orientation: Right  Wound Outcome: Unknown (No longer present)       [REMOVED] Wound 09/05/23 Catheter entry/exit site Wrist Right (Removed)   Resolved Date: 09/19/23  Date First Assessed/Time First Assessed: 09/05/23 0921   Traumatic Wound Type: Catheter entry/exit site  Location: Wrist  Wound Location Orientation: Right  Wound Outcome: Unknown (No longer present)       [REMOVED] Wound 09/05/23 Skin Tear Pretibial Left; Outer (Removed)   Resolved Date: 09/19/23  Date First Assessed/Time First Assessed: 09/05/23 0730   Present on Original Admission: Yes  Primary Wound Type: Skin Tear  Location: Pretibial  Wound Location Orientation: Left; Outer  Wound Outcome: (c) Other (Comment)       Subjective:      .    9/26/23: Dressing changes from VNA and her son. Denies any local or systemic symptoms of infection today. Happy with wound healing. Has been elevating legs. 9/19/23: Monette Spurling is a pleasant 82-year-old female with a past medical history of type 2 diabetes mellitus most recent A1c 5.8% 1 month ago, diabetic polyneuropathy, atrial  fibrillation on Xarelto, obesity, history of heart failure with most recent echocardiogram showing normal function here today for initial wound care consult. Per chart review patient sustained the wound of her left lower extremity on August 21, 2023 when she closed a car door on her leg. She went the next day to her PCP and was advised to apply warm compresses. She has been seen in the ED multiple times for wound checks. Initially she has been put on clindamycin and given Tdap vaccination. Wound culture taken which grew 1+ staph coag negative, and later wound culture grew 1+ Staph epidermidis during inpatient stay. She was admitted for 3 days from 9/5 to 9/8/2023 at [facility] and underwent bedside I&D with placement of wound VAC by podiatry. Arterial duplex showed no significant arterial disease however PVR tracings were dampened. Great toe pressure  within healing range. Blood cultures were negative. She is instructed to follow-up outpatient with podiatry and VNA was set up for patient. Recently patient was seen again in ER on September 15, 2023 when there was concern again for infection of the wound. She was prescribed clindamycin and referred to wound management. Referral was placed by physician assistant Taniya Page. Today patient continues to take clindamycin as directed. Denies any local or systemic signs of infection including fever chills diaphoresis.      9/5/23: LEADs  RIGHT LOWER LIMB:  No evidence of significant lower extremity arterial occlusive disease. Ankle/Brachial index: 2.11 which is unreliable due to non compressible vessels. PVR/ PPG tracings are dampened. Metatarsal pressure of 109 mmHg  Great toe pressure of 81 mmHg, within the healing range. LEFT LOWER LIMB:  No evidence of significant lower extremity arterial occlusive disease. Ankle/Brachial index: 2.11 which is unreliable due to non compressible vessels. PVR/ PPG tracings are dampened. Metatarsal pressure of 152 mmHg  Great toe pressure of 92 mmHg, within the healing range.            The following portions of the patient's history were reviewed and updated as appropriate: allergies, current medications, past family history, past medical history, past social history, past surgical history and problem list.    Review of Systems   Skin: Positive for wound. All other systems reviewed and are negative. Objective:       Wound 09/19/23 Traumatic Leg Left;Lateral (Active)   Wound Image Images linked 09/26/23 0947   Wound Description Pink;Yellow;Slough; Hypergranulation 09/26/23 0947   Dorys-wound Assessment Intact; Hyperpigmented; Other (Comment) (rolled edge) 09/26/23 0947   Wound Length (cm) 3.6 cm 09/26/23 0947   Wound Width (cm) 3.4 cm 09/26/23 0947   Wound Depth (cm) 0.2 cm 09/26/23 0947   Wound Surface Area (cm^2) 12.24 cm^2 09/26/23 0947   Wound Volume (cm^3) 2.448 cm^3 09/26/23 0947   Calculated Wound Volume (cm^3) 2.45 cm^3 09/26/23 0947   Change in Wound Size % 9.26 09/26/23 0947   Drainage Amount Moderate 09/26/23 0947   Drainage Description Serosanguineous 09/26/23 0947   Non-staged Wound Description Full thickness 09/26/23 0947   Dressing Status Intact 09/26/23 0947       Wound 09/26/23 Traumatic Leg Right; Anterior (Active)   Wound Image Images linked 09/26/23 0951   Wound Description Granulation tissue;Pink; Other (Comment) (blister) 09/26/23 0951   Dorys-wound Assessment Purple 09/26/23 0951   Wound Length (cm) 0.5 cm 09/26/23 0951 Wound Width (cm) 1 cm 09/26/23 0951   Wound Depth (cm) 0.1 cm 09/26/23 0951   Wound Surface Area (cm^2) 0.5 cm^2 09/26/23 0951   Wound Volume (cm^3) 0.05 cm^3 09/26/23 0951   Calculated Wound Volume (cm^3) 0.05 cm^3 09/26/23 0951   Undermining 0 09/26/23 0951   Undermining is depth extending from 0.2 09/26/23 0951   Drainage Amount Small 09/26/23 0951   Drainage Description Serosanguineous 09/26/23 0951   Non-staged Wound Description Full thickness 09/26/23 0951   Dressing Status Other (Comment) (no dressing on arrival) 09/26/23 0951       /72   Pulse 78   Temp 97.5 °F (36.4 °C)   Resp 18     Physical Exam  Vitals reviewed. Constitutional:       Appearance: Normal appearance. HENT:      Head: Normocephalic and atraumatic. Mouth/Throat:      Mouth: Mucous membranes are moist.   Eyes:      Extraocular Movements: Extraocular movements intact. Pulmonary:      Effort: Pulmonary effort is normal.   Musculoskeletal:      Right lower leg: Edema present. Left lower leg: Edema present. Skin:     Comments: Pigmentation of bilateral lower extremities consistent with chronic venous stasis disease    New wound of anterior right lower extremity distal to the knee. 2 small punctate wound openings. Serosanguineous exudate. Left lower extremity wound distal lateral smaller than last exam.  Healthy granulation tissue. Healthy wound bed. Rolled edges. Serosanguineous exudate. Neurological:      Mental Status: She is alert. Psychiatric:         Mood and Affect: Mood normal.         Behavior: Behavior normal.                 Wound Instructions:  Orders Placed This Encounter   Procedures   • Wound cleansing and dressings     Wash your hands with soap and water. Remove old dressing, discard into plastic bag and place in trash. Cleanse the wound with mild soap and water prior to applying a clean dressing. Do not use tissue or cotton balls. Do not scrub the wound. Pat dry using gauze.   Shower yes prior to dressing change. Apply moisturizer to skin surrounding wound  Apply polymem max AG to the left leg wound. Cover with gauze  Secure with oscar and tape  Change dressing 3x week           Standing Status:   Future     Standing Expiration Date:   9/26/2024   • Wound compression and edema control     Elastic Tubular Stocking size F     Tubular elastic bandage: Apply from base of toes to behind the knee. Apply in AM, may remove for sleep. Avoid prolonged standing in one place. Elevate leg(s) above the level of the heart when sitting or as much as possible.      •     Standing Status:   Future     Standing Expiration Date:   9/26/2024   • Wound home care     Continue home care for wound assess and care. Wound care dressing changes 3x week as ordered     Standing Status:   Future     Standing Expiration Date:   9/26/2024   • Debridement     This order was created via procedure documentation   • Debridement     This order was created via procedure documentation        Diagnosis ICD-10-CM Associated Orders   1. Traumatic open wound of right lower leg, initial encounter  S81.801A lidocaine (XYLOCAINE) 4 % topical solution 5 mL     Wound cleansing and dressings     Wound compression and edema control     Wound home care     Debridement      2. Traumatic open wound of left lower leg, initial encounter  S81.802A lidocaine (XYLOCAINE) 4 % topical solution 5 mL     Wound cleansing and dressings     Wound compression and edema control     Wound home care     Debridement      3. Lymphedema of both lower extremities  I89.0       4. Lipedema  R60.9       5. Chronic venous hypertension (idiopathic) with ulcer of right lower extremity (CODE) ([facility])  I87.311       6. Chronic anticoagulation  Z79.01       7. Chronic diastolic CHF (congestive heart failure) ([facility])  I50.32       8. 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[address])  I70.209            --  Tong Araiza MD    "This note has been constructed using a voice recognition system. Therefore there may be syntax, spelling, and/or grammatical errors. Occasional wrong word or "sound alike" substitutions may have occurred due to the inherent limitations of voice recognition software. Read the chart carefully and recognize, using context, where substitutions have occurred.  Please call if you have any questions."

## 2023-09-26 NOTE — PROGRESS NOTES
Cardiology  Office Visit Note  Theresa Mar   82 y.o.   female   MRN: [id number]  [place]  [address] [facility]  [place] [address] [facility] [address]  [phone]    PCP: Annie Coats MD  Cardiologist: Dr. Siva Donaldson            Summary of recommendations  I reviewed the importance of a salt restricted diet  Continue the current plan   Will refer to CT surgery when appropriate, after left lower extremity wound healing  Follow up will be scheduled with Dr Siva Donaldson 10/20/23  Colon Ca screenin2020 , up-to-date        Assessment/plan  Mod- Severe aortic stenosis. Work-up is in process for TAVR  · [facility] - no significant CAD  · CTS eval is deferred until leg wound is more healed  Chronic A fib. ·  currently on metoprolol tartrate 75 mg twice a day   · On oral anticoagulation with w Xarelto 20 mg daily  S/P MDT PPM  Interrogation  22. NOT MRI conditional.  25%. Multiple VHR episodes detected. RVR noted. Pulmonary hypertension. On torsemide 20 mg daily  Hypertension, essential.  /92  on metoprolol tartrate 75 mg q.12, loop diuretic  Hyperlipidemia, OFF atorvastatin 40 mg daily. Chronic Anticoagulation  Left lower extremity wound being followed carefully by the wound care center at [facility]  Breast cancer  History of Carrero's esophagus  Cardiac testing  • TTE 21. EF  55%. No definitive regional wall motion abnormality seen. A cannot be excluded on the basis of this study. RV the upper limit of normal.  Borderline RV systolic function. Moderate left atrial enlargement. Marked right enlargement. Mild-to-moderate MR. Aortic valve is probably trileaflet. Leaflets show thickening with mild calcification and reduced cuspal separation. Trace AI. Mild-to-moderate aortic stenosis. Mean/peak gradient is 16/32 mm Hg, GABRIEL is 1.1 cm2, DVI is 0.33. Moderate severe TR.   There is moderate pulmonary hypertension with RV systolic pressure of 73-37 mm HgCOMPARISONS:Compared to previous echo from 11/28/2018, valvular heart disease has increased along with PA pressure  ·  TTE 6/1/23 LVEF 55%. Motion is normal.  RV cavity mildly dilated. Moderate STEFANIA. Mod Severe AS. Mild MR. Mild to moderate TR. · [facility]  9/5/23. No significant coronary atherosclerosis. The RCA ostium is relatively low in the right sinus. YARA Neff is a 79 yo female with chronic atrial fibrillation, mild-to-moderate aortic stenosis, essential hypertension. She had a prior pacemaker. She follows with Dr. Frances Cruz. She has been stable on anticoagulation with warfarin. She was last seen in the office in February 2022; stable. She was having some fatigue and arthritis sx.    5/23/22  Acute visit   Went to the ED 4/17/22 Had an episode of intense chest pounding 10 min. Recurred x 3. Symptoms occurred in the middle of night. After workup she was provided a prescription for Protonix  Her EKG showed AFib 91 beats per minute. CMP unremarkable, lipase normal, magnesium 2.3, CBC stable, INR 1.79, proBNP 1080, troponin 11, chest x-ray no active disease, troponin 10. Venous duplex 4/29/22:  No DVT  ROS:  She has had no recurrence of the chest pounding. She has significant right lower extremity edema ever since her knee replacement in March. She has some swelling on the left side but not as pronounced. Admits to [facility]. She lives in a house with steps. Occasional palpitations  EKG atrial fibrillation, 97 beats per minute with frequent ventricular paced beats  Pacer interrogation 5/18/22:  AFib with multiple VHR episodes, VHR  /104  Assessment:  Volume overload  Plan:  Increase Lasix to 40 b.i.d.  X1 week, then decrease to daily  Follow-up labs 2 weeks-    Interval history  8/9/22; 1/20/23; OV Dr Frances Cruz    7/28/23 Ov Dr Frances Cruz  F/U A-fib  Dyspnea with moderate exertion  Switched from warfarin to Xarelto    9/5/23 [facility]:   No significant CAD-.  out pt W/U for TAVR    Adm 9/5-9/8/23 [facility]  Patient hit her LLE on the car door 2 weeks ago and developed a wound at that area. evaluated in ED 8/26--suspected to have infected hematoma which was aspirated per ED note and was placed on clindamycin. Patient presented after [facility] for left lateral leg eschar. 9/6: S/p bedside I&D with placement of wound VAC with podiatry. Noted to have large hematoma with no purulence noted. Arterial duplex: No significant arterial disease  Venous duplex: No signs of DVT  Abx DCd  BC neg  Wound culture-with contaminant      9/10/23 ED visit, referred by [facility]  VAC removed, had bleeding; pressure applied sent to ED    9/13/23  OV Podiatry  At this time patient will continue with [facility]NA. She will watch for signs of infection. referred to vascular surgery. 9/15/23 ED visit wound check, referred by REENA. LLE wound Bleeding and red  Rx clindamycin 20 mg 4 times daily  Referred To [address] [place] [address] wound care     9/19/23  wound debrided at wound care center    9/26/23  Debrided at wound care center. Notes indicate wound is healing  No signs of infection   recommend follow-up in 2 weeks    9/27/23  (PMH: atrial fibrillation on Xarelto, CHF, type 2 diabetes mellitus with neuropathy. CAD, severe AS, recent [facility] work-up of TAVR recent left lower extremity injury. She has venous insufficiency, chronic edema. Recently treated for infected hematoma discharged with a wound VAC . podiatry referred her to vascular surgery)  Hospital follow-up. From a cardiac perspective, she is stable. She denies chest pain, worsening shortness of breath, palpitations. She is adherent to her medicines taking a loop diuretic. She is frustrated by the healing of her leg wounds. Today, her legs are wrapped they were not unwrapped  She appears euvolemic  I reiterated the importance of a salt restricted diet and reviewed anatomy of aortic stenosis  For now we will continue the current plan, she is on anticoagulant.   She return in a few weeks to see Dr. Hardik Ron        I have spent 45 minutes with Patient and family today in which greater than 50% of this time was spent in counseling/coordination of care regarding Intructions for management, Patient and family education, Importance of tx compliance and Risk factor reductions. Assessment  Diagnoses and all orders for this visit:    Hospital discharge follow-up    Aortic stenosis, severe    Chronic diastolic CHF (congestive heart failure) (HCC)    Pacemaker    Persistent atrial fibrillation ([address])    Essential hypertension    Deep venous insufficiency          Past Medical History:   Diagnosis Date   • Arthritis    • Atrial fibrillation ([address])    • Carrero's esophagus     last assessed: 1/23/2018   • BRCA1 negative    • BRCA2 negative    • Breast cancer ([address]) 2006    stage 1 (left), given adjuvant radiation with Arimidex x 5 years   • Cancer ([address]) 2006    Left Breast, Lumpectomy   • Cataract     last assessed: 3/11/2014   • Chronic diastolic CHF (congestive heart failure) ([address]) 11/21/2022   • Dysplasia of toenail     last assessed: 8/29/2017   • Esophageal reflux    • GERD (gastroesophageal reflux disease)    • Gross hematuria     last assessed: 2/19/2015   • Hematuria    • Hiatal hernia    • History of radiation therapy    • Hypertension    • Irregular heart beat     AFIB   • Mixed sensory-motor polyneuropathy    • Neuropathy    • Obesity    • Pacemaker    • Paroxysmal atrial fibrillation (HCC)    • Peripheral neuropathy    • Rectal bleeding    • Restless leg syndrome    • Shortness of breath     last assessed: 1/11/2016       Review of Systems   Constitutional: Negative for chills and malaise/fatigue. Cardiovascular: Negative for chest pain, claudication, cyanosis, dyspnea on exertion, irregular heartbeat, leg swelling, near-syncope, orthopnea, palpitations, paroxysmal nocturnal dyspnea and syncope. Lower extremity leg wounds   Respiratory: Negative for cough and shortness of breath. Musculoskeletal: Positive for arthritis. Gastrointestinal: Negative for heartburn and nausea. Neurological: Negative for dizziness, focal weakness, headaches, light-headedness and weakness. All other systems reviewed and are negative. Allergies   Allergen Reactions   • Latex      Added based on information entered during case entry, please review and add reactions, type, and severity as needed   • Cephalexin Rash   • Duloxetine Hcl Other (See Comments)     Facial pins and needles sensation   • Erythromycin Rash   • Levofloxacin Other (See Comments)     Muscular aches   • Penicillins Rash   • Savella [Milnacipran] Rash   • Sulfa Antibiotics Rash     . Current Outpatient Medications:   •  acetaminophen (TYLENOL) 325 mg tablet, Take 3 tablets (975 mg total) by mouth every 8 (eight) hours (Patient taking differently: Take 975 mg by mouth every 8 (eight) hours As Needed), Disp: , Rfl: 0  •  Calcium Acetate, Phos Binder, (CALCIUM ACETATE PO), Take by mouth daily  , Disp: , Rfl:   •  clobetasol (TEMOVATE) 0.05 % ointment, Apply once weekly to the affected area, Disp: 30 g, Rfl: 3  •  famotidine (PEPCID) 40 MG tablet, TAKE 1 TABLET BY MOUTH EVERY DAY, Disp: 90 tablet, Rfl: 1  •  fluticasone (FLONASE) 50 mcg/act nasal spray, SPRAY 1 SPRAY INTO EACH NOSTRIL EVERY DAY, Disp: 48 mL, Rfl: 3  •  gabapentin (NEURONTIN) 800 mg tablet, TAKE 1 TABLET BY MOUTH FOUR TIMES A DAY, Disp: 360 tablet, Rfl: 1  •  loratadine (CLARITIN) 10 mg tablet, Take 10 mg by mouth daily, Disp: , Rfl:   •  magnesium 30 MG tablet, Take 30 mg by mouth in the morning, Disp: , Rfl:   •  metoprolol tartrate (LOPRESSOR) 50 mg tablet, TAKE 1 AND 1/2 TABLETS BY MOUTH 2 TIMES A DAY, Disp: 270 tablet, Rfl: 3  •  multivitamin (THERAGRAN) TABS, Take 1 tablet by mouth daily, Disp: , Rfl:   •  pramipexole (MIRAPEX) 0.5 mg tablet, TAKE 2 FULL TABLETS TWICE A DAY AT 5:00 P. M.  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      Comment: retired   Tobacco Use   • Smoking status: Former     Packs/day: 1.00     Years: 25.00     Total pack years: 25.00     Types: Cigarettes     Quit date: 1980     Years since quittin.0   • Smokeless tobacco: Never   • Tobacco comments:     Quit over 30 years ago; quit age 41   Vaping Use   • Vaping Use: Never used   Substance and Sexual Activity   • Alcohol use: Not Currently   • Drug use: No   • Sexual activity: Not on file   Other Topics Concern   • Not on file   Social History Narrative         Social Determinants of Health     Financial Resource Strain: Low Risk  (2022)    Overall Financial Resource Strain (CARDIA)    • Difficulty of Paying Living Expenses: Not very hard   Food Insecurity: No Food Insecurity (2023)    Hunger Vital Sign    • Worried About Running Out of Food in the Last Year: Never true    • Ran Out of Food in the Last Year: Never true   Transportation Needs: No Transportation Needs (2023)    PRAPARE - Transportation    • Lack of Transportation (Medical): No    • Lack of Transportation (Non-Medical):  No   Physical Activity: Not on file   Stress: Not on file   Social Connections: Not on file   Intimate Partner Violence: Not on file   Housing Stability: Unknown (2023)    Housing Stability Vital Sign    • Unable to Pay for Housing in the Last Year: No    • Number of Places Lived in the Last Year: Not on file    • Unstable Housing in the Last Year: No       Family History Problem Relation Age of Onset   • Hypertension Mother    • Heart disease Father    • Aneurysm Father    • Coronary artery disease Father         in his 76s with aneurysm   • Aortic aneurysm Father         abdominal   • Scleroderma Sister    • Breast cancer Sister 76   • Hypertension Sister    • Cancer Sister    • No Known Problems Son    • No Known Problems Son    • Testicular cancer Son 39   • Thyroid cancer Son 45   • Colon cancer Maternal Aunt    • Colon cancer Maternal Aunt    • Breast cancer Other 48        kaylee's daughter   • Alcohol abuse Neg Hx    • Substance Abuse Neg Hx    • Mental illness Neg Hx    • Depression Neg Hx        Physical Exam  Vitals and nursing note reviewed. Constitutional:       General: She is not in acute distress. Appearance: She is obese. She is not diaphoretic. HENT:      Head: Normocephalic and atraumatic. Eyes:      Conjunctiva/sclera: Conjunctivae normal.   Cardiovascular:      Rate and Rhythm: Normal rate and regular rhythm. Pulses: Intact distal pulses. Heart sounds: Murmur heard. Harsh midsystolic murmur is present with a grade of 3/6 at the upper right sternal border radiating to the neck. Pulmonary:      Effort: Pulmonary effort is normal.      Breath sounds: No rales. Abdominal:      General: Bowel sounds are normal.      Palpations: Abdomen is soft. Musculoskeletal:         General: Normal range of motion. Cervical back: Normal range of motion and neck supple. Right lower leg: No edema. Left lower leg: No edema. Comments: Appears euvolemic. Her legs are wrapped, they were not unwrapped for assessment. She is followed by the wound center. She uses a cane as needed   Skin:     General: Skin is warm and dry. Neurological:      Mental Status: She is alert and oriented to person, place, and time. Vitals: Blood pressure 126/92, pulse 84, height 5' 4" (1.626 m), weight 104 kg (228 lb 12.8 oz), SpO2 95 %.    Wt Readings from Last 3 Encounters:   09/27/23 104 kg (228 lb 12.8 oz)   09/21/23 104 kg (229 lb)   09/19/23 104 kg (230 lb)         Labs & Results:  Lab Results   Component Value Date    WBC 6.88 09/07/2023    HGB 12.3 09/07/2023    HCT 39.3 09/07/2023    MCV 98 09/07/2023     09/07/2023     No results found for: "BNP"  No components found for: "CHEM"  Troponin I   Date Value Ref Range Status   06/06/2019 <0.02 <=0.04 ng/mL Final     Comment:     3Autovalidation override  Siemens Chemistry analyzer 99% cutoff is > 0.04 ng/mL in network labs     o cTnI 99% cutoff is useful only when applied to patients in the clinical setting of myocardial ischemia   o cTnI 99% cutoff should be interpreted in the context of clinical history, ECG findings and possibly cardiac imaging to establish correct diagnosis. o cTnI 99% cutoff may be suggestive but clearly not indicative of a coronary event without the clinical setting of myocardial ischemia. 06/06/2019 <0.02 <=0.04 ng/mL Final     Comment:     3Autovalidation override  Siemens Chemistry analyzer 99% cutoff is > 0.04 ng/mL in network labs     o cTnI 99% cutoff is useful only when applied to patients in the clinical setting of myocardial ischemia   o cTnI 99% cutoff should be interpreted in the context of clinical history, ECG findings and possibly cardiac imaging to establish correct diagnosis. o cTnI 99% cutoff may be suggestive but clearly not indicative of a coronary event without the clinical setting of myocardial ischemia. 11/29/2018 <0.02 <=0.04 ng/mL Final     Comment:     3Autovalidation override  Siemens Chemistry analyzer 99% cutoff is > 0.04 ng/mL in network labs     o cTnI 99% cutoff is useful only when applied to patients in the clinical setting of myocardial ischemia   o cTnI 99% cutoff should be interpreted in the context of clinical history, ECG findings and possibly cardiac imaging to establish correct diagnosis.    o cTnI 99% cutoff may be suggestive but clearly not indicative of a coronary event without the clinical setting of myocardial ischemia. Results for orders placed during the hospital encounter of 21    Echo complete with contrast if indicated    Narrative  [address] [address] [facility].  [place], [address] [place] [address]  [phone]    Transthoracic Echocardiogram  2D, M-mode, Doppler, and Color Doppler    Study date:  04-May-2021    Patient: Seleta Mode  MR number: [id number]  Account number: [de-identified]  : 1940  Age: 68 years  Gender: Female  Status: Outpatient  Location: Echo lab  Height: 64.5 in  Weight: 223.5 lb  BP: 132/ 80 mmHg    Indications: A.Fib. Diagnoses: I48.1 - Atrial flutter    Sonographer:  MORRIS Jackson  Primary Physician:  Lalo Matthews MD  Referring Physician:  Ethan Yan MD  Group:  [facility]  Interpreting Physician:  Suraj Coulter MD    SUMMARY    LEFT VENTRICLE:  Normal left ventricular chamber dimension  There is mild concentric left ventricular  Left ventricular ejection fraction is about 55%  No definite regional wall motion abnormality seen, although it cannot be completely excluded from this study. Indeterminate diastolic function because of underlying mitral annular calcification    RIGHT VENTRICLE:  RV size is at upper limit of normal  Borderline RV systolic function  Device lead visualized in RV cavity    LEFT ATRIUM:  Moderately dilated left atrium    RIGHT ATRIUM:  Markedly dilated right atrium  Device lead visualized in right atrium    MITRAL VALVE:  Mildly thickened mitral valve leaflets with moderate annular calcification. There is mild to moderate mitral regurgitation  There was no evidence of mitral stenosis    AORTIC VALVE:  Aortic valve is probably trileaflet. Leaflets show thickening with mild calcification and reduced cuspal separation.   There is trace aortic regurgitation  There is mild to moderate aortic stenosis  Mean/peak gradient is 16/32 mm Hg, GABRIEL is 1.1 cm2, DVI is 0.33    TRICUSPID VALVE:  Moderate to severe tricuspid regurgitation. There is moderate pulmonary hypertension with RV systolic pressure of 06-42 mm Hg    PULMONIC VALVE:  There is mild pulmonary regurgitation    COMPARISONS:  Compared to previous echo from 11/28/2018, valvular heart disease has increased along with PA pressure    HISTORY: PRIOR HISTORY: Breast cancer,Carrero's esophagus,gastroesophageal reflux disease,pacemaker,diabetes,HTN. PROCEDURE: The procedure was performed in the echo lab. This was a routine study. The transthoracic approach was used. The study included complete 2D imaging, M-mode, complete spectral Doppler, and color Doppler. The heart rate was 88 bpm,  at the start of the study. Images were obtained from the parasternal, apical, subcostal, and suprasternal notch acoustic windows. Image quality was adequate. LEFT VENTRICLE: Normal left ventricular chamber dimension  There is mild concentric left ventricular  Left ventricular ejection fraction is about 55%  No definite regional wall motion abnormality seen, although it cannot be completely excluded from this study. Indeterminate diastolic function because of underlying mitral annular calcification    RIGHT VENTRICLE: RV size is at upper limit of normal  Borderline RV systolic function  Device lead visualized in RV cavity    LEFT ATRIUM: Moderately dilated left atrium    RIGHT ATRIUM: Markedly dilated right atrium  Device lead visualized in right atrium    MITRAL VALVE: Mildly thickened mitral valve leaflets with moderate annular calcification. There is mild to moderate mitral regurgitation  There was no evidence of mitral stenosis    AORTIC VALVE: Aortic valve is probably trileaflet. Leaflets show thickening with mild calcification and reduced cuspal separation.   There is trace aortic regurgitation  There is mild to moderate aortic stenosis  Mean/peak gradient is 16/32 mm Hg, GABRIEL is 1.1 cm2, DVI is 0.33    TRICUSPID VALVE: Moderate to severe tricuspid regurgitation. There is moderate pulmonary hypertension with RV systolic pressure of 53-24 mm Hg    PULMONIC VALVE: There is mild pulmonary regurgitation    AORTA: Normal aortic root diameter    SYSTEM MEASUREMENT TABLES    2D  EF (Teich): 40.48 %  %FS: 19.79 %  Ao Diam: 3.13 cm  EDV(Teich): 114.46 ml  ESV(Teich): 68.13 ml  IVSd: 1.16 cm  LA Area: 29.65 cm2  LA Diam: 4.68 cm  LVIDd: 4.93 cm  LVIDs: 3.95 cm  LVOT Diam: 1.89 cm  LVPWd: 1.11 cm  RA Area: 28.31 cm2  RVIDd: 3.84 cm  SV (Teich): 46.33 ml    CW  AV Env. Ti: 342.53 ms  AV VTI: 60.06 cm  AV Vmax: 2.84 m/s  AV Vmean: 1.75 m/s  AV maxP.25 mmHg  AV meanP.09 mmHg  TR Vmax: 3.73 m/s  TR maxP.66 mmHg    MM  TAPSE: 1.19 cm    PW  MV E/A Ratio: 3.63  E' Sept: 0.08 m/s  E/E' Sept: 16.34  LVOT Env. Ti: 296.86 ms  LVOT VTI: 19.53 cm  LVOT Vmax: 1.07 m/s  LVOT Vmean: 0.66 m/s  LVOT maxP.59 mmHg  LVOT meanP.17 mmHg  MV A Mynor: 0.35 m/s  MV Dec Rush: 7.43 m/s2  MV DecT: 171.81 ms  MV E Mynor: 1.28 m/s  MV PHT: 49.82 ms  MVA By PHT: 4.42 cm2    [facility] Accredited Echocardiography Laboratory    Prepared and electronically signed by    Britt Martínez MD  Signed 04-May-2021 14:07:15    No results found for this or any previous visit. This note was completed in part utilizing [employer] direct voice recognition software. Grammatical errors, random word insertion, spelling mistakes, and incomplete sentences may be an occasional consequence of the system secondary to software limitations, ambient noise and hardware issues. At the time of dictation, efforts were made to edit, clarify and /or correct errors. Please read the chart carefully and recognize, using context, where substitutions have occurred.   If you have any questions or concerns about the context, text or information contained within the body of this dictation, please contact myself, the provider, for further clarification

## 2023-09-27 ENCOUNTER — OFFICE VISIT (OUTPATIENT)
Dept: CARDIOLOGY CLINIC | Facility: CLINIC | Age: 83
End: 2023-09-27
Payer: MEDICARE

## 2023-09-27 VITALS
SYSTOLIC BLOOD PRESSURE: 126 MMHG | OXYGEN SATURATION: 95 % | BODY MASS INDEX: 39.06 KG/M2 | WEIGHT: 228.8 LBS | HEART RATE: 84 BPM | HEIGHT: 64 IN | DIASTOLIC BLOOD PRESSURE: 92 MMHG

## 2023-09-27 DIAGNOSIS — Z95.0 PACEMAKER: ICD-10-CM

## 2023-09-27 DIAGNOSIS — I10 ESSENTIAL HYPERTENSION: ICD-10-CM

## 2023-09-27 DIAGNOSIS — I50.32 CHRONIC DIASTOLIC CHF (CONGESTIVE HEART FAILURE) (HCC): ICD-10-CM

## 2023-09-27 DIAGNOSIS — I87.2 DEEP VENOUS INSUFFICIENCY: ICD-10-CM

## 2023-09-27 DIAGNOSIS — Z09 HOSPITAL DISCHARGE FOLLOW-UP: Primary | ICD-10-CM

## 2023-09-27 DIAGNOSIS — I48.19 PERSISTENT ATRIAL FIBRILLATION (HCC): ICD-10-CM

## 2023-09-27 DIAGNOSIS — I35.0 AORTIC STENOSIS, SEVERE: ICD-10-CM

## 2023-09-27 PROCEDURE — 99214 OFFICE O/P EST MOD 30 MIN: CPT | Performed by: NURSE PRACTITIONER

## 2023-09-27 NOTE — PATIENT INSTRUCTIONS
DASH Eating Plan   WHAT YOU NEED TO KNOW:   The DASH (Dietary Approaches to Stop Hypertension) Eating Plan is designed to help prevent or lower high blood pressure. It can also help to lower LDL (bad) cholesterol and decrease your risk for heart disease. The plan is low in sodium, sugar, unhealthy fats, and total fat. It is high in potassium, calcium, magnesium, and fiber. These nutrients are added when you eat more fruits, vegetables, and whole grains. With the DASH eating plan, you need to eat a certain number of servings from each food group. This will help you get enough of certain nutrients and limit others. The amount of servings you should eat depends on how many calories you need. Your dietitian can help you create meal plans with the right number of servings for each food group. DISCHARGE INSTRUCTIONS:   What you need to know about sodium:  Your dietitian will tell you how much sodium is safe for you to have each day. People with high blood pressure should have no more than 1,500 to 2,300 mg of sodium in a day. A teaspoon (tsp) of salt has 2,300 mg of sodium. This may seem like a difficult goal, but small changes to the foods you eat can make a big difference. Your healthcare provider or dietitian can help you create a meal plan that follows your sodium limit. Read food labels. Food labels can help you choose foods that are low in sodium. The amount of sodium is listed in milligrams (mg). The % Daily Value (DV) column tells you how much of your daily needs are met by 1 serving of the food for each nutrient listed. Choose foods that have less than 5% of the DV of sodium. These foods are considered low in sodium. Foods that have 20% or more of the DV of sodium are considered high in sodium. Avoid foods that have more than 300 mg of sodium in each serving. Choose foods that say low-sodium, reduced-sodium, or no salt added on the food label. Limit added salt.   Do not salt food at the table if you add salt when you cook. Use herbs and spices, such as onions, garlic, and salt-free seasonings to add flavor. Try lemon or lime juice or vinegar to add a tart flavor. Use hot peppers or a small amount of hot pepper sauce to add a spicy flavor. Limit foods high in added salt, such as the following:    Seasonings made with salt, such as garlic salt, celery salt, onion salt, seasoned salt, meat tenderizers, and monosodium glutamate (MSG)    Miso soup and canned or dried soup mixes    Regular soy sauce, barbecue sauce, teriyaki sauce, steak sauce, Worcestershire sauce, and most flavored vinegars    Snack foods, such as salted chips, popcorn, pretzels, pork rinds, salted crackers, and salted nuts    Frozen foods, such as dinners, entrees, vegetables with sauces, and breaded meats    Ask about salt substitutes. Ask your healthcare provider if you may use salt substitutes. Some salt substitutes have ingredients that can be harmful if you have certain health conditions. Choose foods carefully at restaurants. Meals from restaurants, especially fast food restaurants, are often high in sodium. Some restaurants have nutrition information that tells you the amount of sodium in their foods. Ask to have your food prepared with less, or no salt. What you need to know about fats:  Healthy fats include unsaturated fats and omega-3 fatty acids. Unhealthy fats include saturated fats and trans fats.   Include healthy fats, such as the following:      Cooking oils, such as soybean, canola, olive, or sunflower    Fatty fish, such as salmon, tuna, mackerel, or sardines    Flaxseed oil or ground flaxseed    ½ cup of cooked beans, such as black beans, kidney beans, or eke beans    1½ ounces of low-sodium nuts, such as almonds or walnuts    Low-sugar, low-sodium peanut butter    Seeds such as emily seeds or sunflower seeds       Limit or do not have unhealthy fats, such as the following:      Foods that contain fat from animals, such as fatty meats, whole milk, butter, and cream    Shortening, stick margarine, palm oil, and coconut oil    Full-fat or creamy salad dressing    Creamy soup    Crackers, chips, and baked goods made with margarine or shortening    Foods that are fried in unhealthy fats    Gravy and sauces, such as Bruce or cheese sauces    What you need to know about carbohydrates (carbs): All carbs break down into sugar. Complex carbs contain more fiber than simple carbs. This means complex carbs go into the bloodstream more slowly and cause less of a blood sugar spike. Try to include more complex carbs and fewer simple carbs. Include complex carbs, such as the followin slice of whole-grain bread    1 ounce of dry cereal that does not contain added sugar    ½ cup of cooked oatmeal    2 ounces of cooked whole-grain pasta    ½ cup of cooked brown rice    Limit or do not have simple carbs, such as the following:      [employer], such as doughnuts, pastries, and cookies    Mixes for cornbread and biscuits    White rice and pasta mixes, such as boxed macaroni and cheese    Instant and cold cereals that contain sugar    Jelly, jam, and ice cream that contain sugar    Condiments such as ketchup    Drinks high in sugar, such as soft drinks, lemonade, and fruit juice    What you need to know about vegetables and fruits:  Vegetables and fruits can be fresh, frozen, or canned. If possible, try to choose low-sodium canned options.   Include a variety of vegetables and fruits, such as the followin medium apple, pear, or peach (about ½ cup chopped)    ½ small banana    ½ cup berries, such as blueberries, strawberries, or blackberries    1 cup of raw leafy greens, such as lettuce, spinach, kale, or aviva greens    ½ cup of frozen or canned (no added salt) vegetables, such as green beans    ½ cup of fresh, frozen, or canned fruit (canned in light syrup or fruit juice)    ½ cup of vegetable or fruit juice    Limit or do not have vegetables and fruits made in the following ways:      Frozen fruit such as cherries that have added sugar    Fruit in cream or butter sauce    Canned vegetables that are high in sodium    Sauerkraut, pickled vegetables, and other foods prepared in brine    Fried vegetables or vegetables in butter or high-fat sauces    What you need to know about protein foods: Include lean or low-fat protein foods, such as the following:      Poultry (chicken, turkey) with no skin    Fish (especially fatty fish, such as salmon, fresh tuna, or mackerel)    Lean beef and pork (loin, round, extra lean hamburger)    Egg whites and egg substitutes    1 cup of nonfat (skim) or 1% milk    1½ ounces of fat-free or low-fat cheese    6 ounces of nonfat or low-fat yogurt    Limit or do not have high-fat protein foods, such as the following:      Smoked or cured meat, such as corned beef, jett, ham, hot dogs, and sausage    Canned beans and canned meats or spreads, such as potted meats, sardines, anchovies, and imitation seafood    Deli or lunch meats, such as bologna, ham, turkey, and roast beef    High-fat meat (T-bone steak, regular hamburger, and ribs)    Whole eggs and egg yolks    Whole milk, 2% milk, and cream    Regular cheese and processed cheese    Other guidelines to follow:   Maintain a healthy weight. Your risk for heart disease is higher if you have extra weight. Ask your healthcare provider what a healthy weight is for you. Your provider may suggest that you lose weight. You can lose weight by eating fewer calories and foods that have added sugars and fat. The DASH meal plan can help you do this. Decrease calories by eating smaller portions at each meal and fewer snacks. Ask your provider for more information about how to lose weight. Exercise regularly. Regular exercise can help you reach or maintain a healthy weight.  Regular exercise can also help decrease your blood pressure and improve your cholesterol levels. Get 30 minutes or more of moderate exercise each day of the week. To lose weight, get at least 60 minutes of exercise. Talk to your healthcare provider about the best exercise program for you. Limit alcohol. Women should limit alcohol to 1 drink a day. Men should limit alcohol to 2 drinks a day. A drink of alcohol is 12 ounces of beer, 5 ounces of wine, or 1½ ounces of liquor. For more information:   [facility] [address]  [address]  [place] [address]  Phone: [phone]  Web Address: [email or web address]no    © Copyright [employer] 2023 Information is for End User's use only and may not be sold, redistributed or otherwise used for commercial purposes. The above information is an  only. It is not intended as medical advice for individual conditions or treatments. Talk to your doctor, nurse or pharmacist before following any medical regimen to see if it is safe and effective for you.

## 2023-09-27 NOTE — LETTER
2023     Deidra Mathews MD  [address]  [place]  [address] [address]    Patient: Nadeem Leslie   YOB: 1940   Date of Visit: 2023       Dear Dr. Anne Dry: Thank you for referring Renard Jennings to me for evaluation. Below are my notes for this consultation. If you have questions, please do not hesitate to call me. I look forward to following your patient along with you. Sincerely,        RICKY Ambriz        CC: No Recipients    RICKY Ambriz  2023 10:24 AM  Sign when Signing Visit  Cardiology  Office Visit Note  Nadeem Leslie   82 y.o.   female   MRN: [id number]  [address]  [phone]    PCP: Deidra Mathews MD  Cardiologist: Dr. Allyn Guerrero            Summary of recommendations  I reviewed the importance of a salt restricted diet  Continue the current plan   To refer to CT surgery when appropriate, after left lower extremity wound healing  Follow up will be scheduled with Dr Allyn Guerrero 10/20/23  Colon Ca screenin2020 , up-to-date        Assessment/plan  Mod- Severe aortic stenosis. Work-up is in process for TAVR  [facility] - no significant CAD  CTS eval is deferred until leg wound is more healed  Chronic A fib.     currently on metoprolol tartrate 75 mg twice a day   On oral anticoagulation with w Xarelto 20 mg daily  S/P MDT PPM  Interrogation  22. NOT MRI conditional.  25%. Multiple VHR episodes detected. RVR noted. Pulmonary hypertension. On torsemide 20 mg daily  Hypertension, essential.  /92  on metoprolol tartrate 75 mg q.12, loop diuretic  Hyperlipidemia, OFF atorvastatin 40 mg daily. Chronic Anticoagulation  Left lower extremity wound being followed carefully by the wound care center at [facility]  History of Carrero's esophagus  Cardiac testing  TTE 21. EF  55%.   No definitive regional wall motion abnormality seen. A cannot be excluded on the basis of this study. RV the upper limit of normal.  Borderline RV systolic function. Moderate left atrial enlargement. Marked right enlargement. Mild-to-moderate MR. Aortic valve is probably trileaflet. Leaflets show thickening with mild calcification and reduced cuspal separation. Trace AI. Mild-to-moderate aortic stenosis. Mean/peak gradient is 16/32 mm Hg, GABRIEL is 1.1 cm2, DVI is 0.33. Moderate severe TR. There is moderate pulmonary hypertension with RV systolic pressure of 34-45 mm HgCOMPARISONS:Compared to previous echo from 11/28/2018, valvular heart disease has increased along with PA pressure   TTE 6/1/23 LVEF 55%. Motion is normal.  RV cavity mildly dilated. Moderate STEFANIA. Mod Severe AS. Mild MR. Mild to moderate TR. LHC  9/5/23. No significant coronary atherosclerosis. The RCA ostium is relatively low in the right sinus. YARA Mishra is a 81 yo female with chronic atrial fibrillation, mild-to-moderate aortic stenosis, essential hypertension. She had a prior pacemaker. She follows with Dr. Hardik Ron. She has been stable on anticoagulation with warfarin. She was last seen in the office in February 2022; stable. She was having some fatigue and arthritis sx.    5/23/22  Acute visit   Went to the ED 4/17/22 Had an episode of intense chest pounding 10 min. Recurred x 3. Symptoms occurred in the middle of night. After workup she was provided a prescription for Protonix  Her EKG showed AFib 91 beats per minute. CMP unremarkable, lipase normal, magnesium 2.3, CBC stable, INR 1.79, proBNP 1080, troponin 11, chest x-ray no active disease, troponin 10. Venous duplex 4/29/22:  No DVT  ROS:  She has had no recurrence of the chest pounding. She has significant right lower extremity edema ever since her knee replacement in March. She has some swelling on the left side but not as pronounced. Admits to LU.   She lives in a house with steps. Occasional palpitations  EKG atrial fibrillation, 97 beats per minute with frequent ventricular paced beats  Pacer interrogation 5/18/22:  AFib with multiple VHR episodes, VHR  /104  Assessment:  Volume overload  Plan:  Increase Lasix to 40 b.i.d. X1 week, then decrease to daily  Follow-up labs 2 weeks-    Interval history  8/9/22; 1/20/23; OV Dr Joanna Hugo    7/28/23 Ov Dr Joanna Hugo  F/U A-fib  Dyspnea with moderate exertion  Switched from warfarin to Xarelto    9/5/23 LHC:   No significant CAD-.  out pt W/U for TAVR    Adm 9/5-9/8/23  SLB  Patient hit her LLE on the car door 2 weeks ago and developed a wound at that area. evaluated in ED 8/26--suspected to have infected hematoma which was aspirated per ED note and was placed on clindamycin. Patient presented after [facility] for left lateral leg eschar. 9/6: S/p bedside I&D with placement of wound VAC with podiatry. Noted to have large hematoma with no purulence noted. Arterial duplex: No significant arterial disease  Venous duplex: No signs of DVT  Abx DCd  BC neg  Wound culture-with contaminant      9/10/23 ED visit, referred by VNA  VAC removed, had bleeding; pressure applied sent to ED    9/13/23  OV Podiatry  At this time patient will continue with VNA. She will watch for signs of infection. referred to vascular surgery. 9/15/23 ED visit wound check, referred by VNA. LLE wound Bleeding and red  Rx clindamycin 20 mg 4 times daily  Referred To [address] [place] [address] wound care     9/19/23  wound debrided at wound care center    9/26/23  Debrided at wound care center. Notes indicate wound is healing  No signs of infection   recommend follow-up in 2 weeks    9/27/23  (PMH: atrial fibrillation on Xarelto, CHF, type 2 diabetes mellitus with neuropathy. CAD, severe AS, recent [facility] work-up of TAVR recent left lower extremity injury. She has venous insufficiency, chronic edema. Recently treated for infected hematoma discharged with a wound VAC . podiatry referred her to vascular surgery)  Hospital follow-up. From a cardiac perspective, she is stable. She denies chest pain, worsening shortness of breath, palpitations. She is adherent to her medicines taking a loop diuretic. She is frustrated by the healing of her leg wounds. Today, her legs are wrapped they were not unwrapped  She appears euvolemic  I reiterated the importance of a salt restricted diet and reviewed anatomy of aortic stenosis  For now we will continue the current plan, she is on anticoagulant. She return in a few weeks to see Dr. Araceli Montero        I have spent 45 minutes with Patient and family today in which greater than 50% of this time was spent in counseling/coordination of care regarding Intructions for management, Patient and family education, Importance of tx compliance and Risk factor reductions.   Assessment  Diagnoses and all orders for this visit:    Hospital discharge follow-up    Aortic stenosis, severe    Chronic diastolic CHF (congestive heart failure) ([facility])    Pacemaker    Persistent atrial fibrillation ([address])    Essential hypertension    Deep venous insufficiency          Past Medical History:   Diagnosis Date   • Arthritis    • Atrial fibrillation ([address])    • Carrero's esophagus     last assessed: 1/23/2018   • BRCA1 negative    • BRCA2 negative    • Breast cancer ([address]) 2006    stage 1 (left), given adjuvant radiation with Arimidex x 5 years   • Cancer ([address]) 2006    Left Breast, Lumpectomy   • Cataract     last assessed: 3/11/2014   • Chronic diastolic CHF (congestive heart failure) ([address]) 11/21/2022   • Dysplasia of toenail     last assessed: 8/29/2017   • Esophageal reflux    • GERD (gastroesophageal reflux disease)    • Gross hematuria     last assessed: 2/19/2015   • Hematuria    • Hiatal hernia    • History of radiation therapy    • Hypertension    • Irregular heart beat     AFIB   • Mixed sensory-motor polyneuropathy    • Neuropathy    • Obesity    • Pacemaker    • Paroxysmal atrial fibrillation (HCC)    • Peripheral neuropathy    • Rectal bleeding    • Restless leg syndrome    • Shortness of breath     last assessed: 1/11/2016       Review of Systems   Constitutional: Negative for chills and malaise/fatigue. Cardiovascular: Negative for chest pain, claudication, cyanosis, dyspnea on exertion, irregular heartbeat, leg swelling, near-syncope, orthopnea, palpitations, paroxysmal nocturnal dyspnea and syncope. Lower extremity leg wounds   Respiratory: Negative for cough and shortness of breath. Musculoskeletal: Positive for arthritis. Gastrointestinal: Negative for heartburn and nausea. Neurological: Negative for dizziness, focal weakness, headaches, light-headedness and weakness. All other systems reviewed and are negative. Allergies   Allergen Reactions   • Latex      Added based on information entered during case entry, please review and add reactions, type, and severity as needed   • Cephalexin Rash   • Duloxetine Hcl Other (See Comments)     Facial pins and needles sensation   • Erythromycin Rash   • Levofloxacin Other (See Comments)     Muscular aches   • Penicillins Rash   • Savella [Milnacipran] Rash   • Sulfa Antibiotics Rash     .     Current Outpatient Medications:   •  acetaminophen (TYLENOL) 325 mg tablet, Take 3 tablets (975 mg total) by mouth every 8 (eight) hours (Patient taking differently: Take 975 mg by mouth every 8 (eight) hours As Needed), Disp: , Rfl: 0  •  Calcium Acetate, Phos Binder, (CALCIUM ACETATE PO), Take by mouth daily  , Disp: , Rfl:   •  clobetasol (TEMOVATE) 0.05 % ointment, Apply once weekly to the affected area, Disp: 30 g, Rfl: 3  •  famotidine (PEPCID) 40 MG tablet, TAKE 1 TABLET BY MOUTH EVERY DAY, Disp: 90 tablet, Rfl: 1  •  fluticasone (FLONASE) 50 mcg/act nasal spray, SPRAY 1 SPRAY INTO EACH NOSTRIL EVERY DAY, Disp: 48 mL, Rfl: 3  •  gabapentin (NEURONTIN) 800 mg tablet, TAKE 1 TABLET BY MOUTH FOUR TIMES A DAY, Disp: 360 tablet, Rfl: 1  •  loratadine (CLARITIN) 10 mg tablet, Take 10 mg by mouth daily, Disp: , Rfl:   •  magnesium 30 MG tablet, Take 30 mg by mouth in the morning, Disp: , Rfl:   •  metoprolol tartrate (LOPRESSOR) 50 mg tablet, TAKE 1 AND 1/2 TABLETS BY MOUTH 2 TIMES A DAY, Disp: 270 tablet, Rfl: 3  •  multivitamin (THERAGRAN) TABS, Take 1 tablet by mouth daily, Disp: , Rfl:   •  pramipexole (MIRAPEX) 0.5 mg tablet, TAKE 2 FULL TABLETS TWICE A DAY AT 5:00 P. M.  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      Comment: retired   Tobacco Use   • Smoking status: Former     Packs/day: 1.00     Years: 25.00     Total pack years: 25.00     Types: Cigarettes     Quit date: 1980     Years since quittin.0   • Smokeless tobacco: Never   • Tobacco comments:     Quit over 30 years ago; quit age 41   Vaping Use   • Vaping Use: Never used   Substance and Sexual Activity   • Alcohol use: Not Currently   • Drug use: No   • Sexual activity: Not on file   Other Topics Concern   • Not on file   Social History Narrative         Social Determinants of Health     Financial Resource Strain: Low Risk  (2022)    Overall Financial Resource Strain (CARDIA)    • Difficulty of Paying Living Expenses: Not very hard   Food Insecurity: No Food Insecurity (2023)    Hunger Vital Sign    • Worried About Running Out of Food in the Last Year: Never true    • Ran Out of Food in the Last Year: Never true Transportation Needs: No Transportation Needs (9/6/2023)    PRAPARE - Transportation    • Lack of Transportation (Medical): No    • Lack of Transportation (Non-Medical): No   Physical Activity: Not on file   Stress: Not on file   Social Connections: Not on file   Intimate Partner Violence: Not on file   Housing Stability: Unknown (9/6/2023)    Housing Stability Vital Sign    • Unable to Pay for Housing in the Last Year: No    • Number of Places Lived in the Last Year: Not on file    • Unstable Housing in the Last Year: No       Family History   Problem Relation Age of Onset   • Hypertension Mother    • Heart disease Father    • Aneurysm Father    • Coronary artery disease Father         in his 76s with aneurysm   • Aortic aneurysm Father         abdominal   • Scleroderma Sister    • Breast cancer Sister 76   • Hypertension Sister    • Cancer Sister    • No Known Problems Son    • No Known Problems Son    • Testicular cancer Son 39   • Thyroid cancer Son 45   • Colon cancer Maternal Aunt    • Colon cancer Maternal Aunt    • Breast cancer Other 48        kaylee's daughter   • Alcohol abuse Neg Hx    • Substance Abuse Neg Hx    • Mental illness Neg Hx    • Depression Neg Hx        Physical Exam  Vitals and nursing note reviewed. Constitutional:       General: She is not in acute distress. Appearance: She is obese. She is not diaphoretic. HENT:      Head: Normocephalic and atraumatic. Eyes:      Conjunctiva/sclera: Conjunctivae normal.   Cardiovascular:      Rate and Rhythm: Normal rate and regular rhythm. Pulses: Intact distal pulses. Heart sounds: Murmur heard. Harsh midsystolic murmur is present with a grade of 3/6 at the upper right sternal border radiating to the neck. Pulmonary:      Effort: Pulmonary effort is normal.      Breath sounds: No rales. Abdominal:      General: Bowel sounds are normal.      Palpations: Abdomen is soft.    Musculoskeletal:         General: Normal range of motion. Cervical back: Normal range of motion and neck supple. Right lower leg: No edema. Left lower leg: No edema. Comments: Appears euvolemic. Her legs are wrapped, they were not unwrapped for assessment. She is followed by the wound center. She uses a cane as needed   Skin:     General: Skin is warm and dry. Neurological:      Mental Status: She is alert and oriented to person, place, and time. Vitals: Blood pressure 126/92, pulse 84, height 5' 4" (1.626 m), weight 104 kg (228 lb 12.8 oz), SpO2 95 %. Wt Readings from Last 3 Encounters:   09/27/23 104 kg (228 lb 12.8 oz)   09/21/23 104 kg (229 lb)   09/19/23 104 kg (230 lb)         Labs & Results:  Lab Results   Component Value Date    WBC 6.88 09/07/2023    HGB 12.3 09/07/2023    HCT 39.3 09/07/2023    MCV 98 09/07/2023     09/07/2023     No results found for: "BNP"  No components found for: "CHEM"  Troponin I   Date Value Ref Range Status   06/06/2019 <0.02 <=0.04 ng/mL Final     Comment:     3Autovalidation override  Siemens Chemistry analyzer 99% cutoff is > 0.04 ng/mL in network labs     o cTnI 99% cutoff is useful only when applied to patients in the clinical setting of myocardial ischemia   o cTnI 99% cutoff should be interpreted in the context of clinical history, ECG findings and possibly cardiac imaging to establish correct diagnosis. o cTnI 99% cutoff may be suggestive but clearly not indicative of a coronary event without the clinical setting of myocardial ischemia. 06/06/2019 <0.02 <=0.04 ng/mL Final     Comment:     3Autovalidation override  Siemens Chemistry analyzer 99% cutoff is > 0.04 ng/mL in network labs     o cTnI 99% cutoff is useful only when applied to patients in the clinical setting of myocardial ischemia   o cTnI 99% cutoff should be interpreted in the context of clinical history, ECG findings and possibly cardiac imaging to establish correct diagnosis.    o cTnI 99% cutoff may be suggestive but clearly not indicative of a coronary event without the clinical setting of myocardial ischemia. 2018 <0.02 <=0.04 ng/mL Final     Comment:     3Autovalidation override  Siemens Chemistry analyzer 99% cutoff is > 0.04 ng/mL in network labs     o cTnI 99% cutoff is useful only when applied to patients in the clinical setting of myocardial ischemia   o cTnI 99% cutoff should be interpreted in the context of clinical history, ECG findings and possibly cardiac imaging to establish correct diagnosis. o cTnI 99% cutoff may be suggestive but clearly not indicative of a coronary event without the clinical setting of myocardial ischemia. Results for orders placed during the hospital encounter of 21    Echo complete with contrast if indicated    Narrative  [address] [place] [address].  [place] [address] [address]  [phone]    Transthoracic Echocardiogram  2D, M-mode, Doppler, and Color Doppler    Study date:  04-May-2021    Patient: Neelam Johnson  MR number: [id number]  Account number: [de-identified]  : 1940  Age: 68 years  Gender: Female  Status: Outpatient  Location: Echo lab  Height: 64.5 in  Weight: 223.5 lb  BP: 132/ 80 mmHg    Indications: A.Fib. Diagnoses: I48.1 - Atrial flutter    Sonographer:  MORRIS Elias  Primary Physician:  Rodrick Richardson MD  Referring Physician:  Maine Horwoitz MD  Group:  [facility]  Interpreting Physician:  Marsha Narayanan MD    SUMMARY    LEFT VENTRICLE:  Normal left ventricular chamber dimension  There is mild concentric left ventricular  Left ventricular ejection fraction is about 55%  No definite regional wall motion abnormality seen, although it cannot be completely excluded from this study.   Indeterminate diastolic function because of underlying mitral annular calcification    RIGHT VENTRICLE:  RV size is at upper limit of normal  Borderline RV systolic function  Device lead visualized in RV cavity    LEFT ATRIUM:  Moderately dilated left atrium    RIGHT ATRIUM:  Markedly dilated right atrium  Device lead visualized in right atrium    MITRAL VALVE:  Mildly thickened mitral valve leaflets with moderate annular calcification. There is mild to moderate mitral regurgitation  There was no evidence of mitral stenosis    AORTIC VALVE:  Aortic valve is probably trileaflet. Leaflets show thickening with mild calcification and reduced cuspal separation. There is trace aortic regurgitation  There is mild to moderate aortic stenosis  Mean/peak gradient is 16/32 mm Hg, GABRIEL is 1.1 cm2, DVI is 0.33    TRICUSPID VALVE:  Moderate to severe tricuspid regurgitation. There is moderate pulmonary hypertension with RV systolic pressure of 80-13 mm Hg    PULMONIC VALVE:  There is mild pulmonary regurgitation    COMPARISONS:  Compared to previous echo from 11/28/2018, valvular heart disease has increased along with PA pressure    HISTORY: PRIOR HISTORY: Breast cancer,Carrero's esophagus,gastroesophageal reflux disease,pacemaker,diabetes,HTN. PROCEDURE: The procedure was performed in the echo lab. This was a routine study. The transthoracic approach was used. The study included complete 2D imaging, M-mode, complete spectral Doppler, and color Doppler. The heart rate was 88 bpm,  at the start of the study. Images were obtained from the parasternal, apical, subcostal, and suprasternal notch acoustic windows. Image quality was adequate. LEFT VENTRICLE: Normal left ventricular chamber dimension  There is mild concentric left ventricular  Left ventricular ejection fraction is about 55%  No definite regional wall motion abnormality seen, although it cannot be completely excluded from this study.   Indeterminate diastolic function because of underlying mitral annular calcification    RIGHT VENTRICLE: RV size is at upper limit of normal  Borderline RV systolic function  Device lead visualized in RV cavity    LEFT ATRIUM: Moderately dilated left atrium    RIGHT ATRIUM: Markedly dilated right atrium  Device lead visualized in right atrium    MITRAL VALVE: Mildly thickened mitral valve leaflets with moderate annular calcification. There is mild to moderate mitral regurgitation  There was no evidence of mitral stenosis    AORTIC VALVE: Aortic valve is probably trileaflet. Leaflets show thickening with mild calcification and reduced cuspal separation. There is trace aortic regurgitation  There is mild to moderate aortic stenosis  Mean/peak gradient is 16/32 mm Hg, GABRIEL is 1.1 cm2, DVI is 0.33    TRICUSPID VALVE: Moderate to severe tricuspid regurgitation. There is moderate pulmonary hypertension with RV systolic pressure of 09-74 mm Hg    PULMONIC VALVE: There is mild pulmonary regurgitation    AORTA: Normal aortic root diameter    SYSTEM MEASUREMENT TABLES    2D  EF (Teich): 40.48 %  %FS: 19.79 %  Ao Diam: 3.13 cm  EDV(Teich): 114.46 ml  ESV(Teich): 68.13 ml  IVSd: 1.16 cm  LA Area: 29.65 cm2  LA Diam: 4.68 cm  LVIDd: 4.93 cm  LVIDs: 3.95 cm  LVOT Diam: 1.89 cm  LVPWd: 1.11 cm  RA Area: 28.31 cm2  RVIDd: 3.84 cm  SV (Teich): 46.33 ml    CW  AV Env. Ti: 342.53 ms  AV VTI: 60.06 cm  AV Vmax: 2.84 m/s  AV Vmean: 1.75 m/s  AV maxP.25 mmHg  AV meanP.09 mmHg  TR Vmax: 3.73 m/s  TR maxP.66 mmHg    MM  TAPSE: 1.19 cm    PW  MV E/A Ratio: 3.63  E' Sept: 0.08 m/s  E/E' Sept: 16.34  LVOT Env. Ti: 296.86 ms  LVOT VTI: 19.53 cm  LVOT Vmax: 1.07 m/s  LVOT Vmean: 0.66 m/s  LVOT maxP.59 mmHg  LVOT meanP.17 mmHg  MV A Mynor: 0.35 m/s  MV Dec Denali: 7.43 m/s2  MV DecT: 171.81 ms  MV E Mynor: 1.28 m/s  MV PHT: 49.82 ms  MVA By PHT: 4.42 cm2    [facility] Accredited Echocardiography Laboratory    Prepared and electronically signed by    Edy Fitzpatrick MD  Signed 04-May-2021 14:07:15    No results found for this or any previous visit.       This note was completed in part utilizing m-modal fluency direct voice recognition software. Grammatical errors, random word insertion, spelling mistakes, and incomplete sentences may be an occasional consequence of the system secondary to software limitations, ambient noise and hardware issues. At the time of dictation, efforts were made to edit, clarify and /or correct errors. Please read the chart carefully and recognize, using context, where substitutions have occurred.   If you have any questions or concerns about the context, text or information contained within the body of this dictation, please contact myself, the provider, for further clarification

## 2023-10-10 ENCOUNTER — OFFICE VISIT (OUTPATIENT)
Dept: WOUND CARE | Facility: HOSPITAL | Age: 83
End: 2023-10-10
Payer: MEDICARE

## 2023-10-10 VITALS — DIASTOLIC BLOOD PRESSURE: 84 MMHG | TEMPERATURE: 97.4 F | SYSTOLIC BLOOD PRESSURE: 137 MMHG | HEART RATE: 76 BPM

## 2023-10-10 DIAGNOSIS — I87.311 CHRONIC VENOUS HYPERTENSION (IDIOPATHIC) WITH ULCER OF RIGHT LOWER EXTREMITY (CODE) (HCC): ICD-10-CM

## 2023-10-10 DIAGNOSIS — R60.9 LIPEDEMA: ICD-10-CM

## 2023-10-10 DIAGNOSIS — I89.0 LYMPHEDEMA OF BOTH LOWER EXTREMITIES: ICD-10-CM

## 2023-10-10 DIAGNOSIS — Z79.01 CHRONIC ANTICOAGULATION: ICD-10-CM

## 2023-10-10 DIAGNOSIS — S81.802A TRAUMATIC OPEN WOUND OF LEFT LOWER LEG, INITIAL ENCOUNTER: ICD-10-CM

## 2023-10-10 DIAGNOSIS — I50.32 CHRONIC DIASTOLIC CHF (CONGESTIVE HEART FAILURE) (HCC): ICD-10-CM

## 2023-10-10 DIAGNOSIS — I70.209 PERIPHERAL ARTERIOSCLEROSIS (HCC): ICD-10-CM

## 2023-10-10 DIAGNOSIS — S81.801A TRAUMATIC OPEN WOUND OF RIGHT LOWER LEG, INITIAL ENCOUNTER: Primary | ICD-10-CM

## 2023-10-10 PROCEDURE — 97597 DBRDMT OPN WND 1ST 20 CM/<: CPT | Performed by: STUDENT IN AN ORGANIZED HEALTH CARE EDUCATION/TRAINING PROGRAM

## 2023-10-10 RX ORDER — LIDOCAINE HYDROCHLORIDE 40 MG/ML
5 SOLUTION TOPICAL ONCE
Status: COMPLETED | OUTPATIENT
Start: 2023-10-10 | End: 2023-10-10

## 2023-10-10 RX ADMIN — LIDOCAINE HYDROCHLORIDE 5 ML: 40 SOLUTION TOPICAL at 10:15

## 2023-10-10 NOTE — PROGRESS NOTES
Patient ID: Pratibha Watkins is a 82 y.o. female Date of Birth 1940     Chief Complaint  Chief Complaint   Patient presents with   • Follow Up Wound Care Visit     Open wound BLE       Allergies  Latex, Cephalexin, Duloxetine hcl, Erythromycin, Levofloxacin, Penicillins, Savella [milnacipran], and Sulfa antibiotics    Assessment:     Diagnoses and all orders for this visit:    Traumatic open wound of right lower leg, initial encounter  -     lidocaine (XYLOCAINE) 4 % topical solution 5 mL  -     Cancel: Wound cleansing and dressings; Future  -     Wound compression and edema control; Future  -     Wound home care; Future  -     Wound cleansing and dressings; Future  -     Debridement    Traumatic open wound of left lower leg, initial encounter  -     lidocaine (XYLOCAINE) 4 % topical solution 5 mL  -     Cancel: Wound cleansing and dressings; Future  -     Wound compression and edema control; Future  -     Wound home care; Future  -     Wound cleansing and dressings; Future  -     Debridement    Lymphedema of both lower extremities  -     lidocaine (XYLOCAINE) 4 % topical solution 5 mL  -     Cancel: Wound cleansing and dressings; Future  -     Wound compression and edema control; Future  -     Wound home care; Future  -     Wound cleansing and dressings; Future    Lipedema  -     lidocaine (XYLOCAINE) 4 % topical solution 5 mL  -     Cancel: Wound cleansing and dressings; Future  -     Wound compression and edema control; Future  -     Wound home care; Future  -     Wound cleansing and dressings; Future    Chronic venous hypertension (idiopathic) with ulcer of right lower extremity (CODE) (HCC)  -     lidocaine (XYLOCAINE) 4 % topical solution 5 mL  -     Cancel: Wound cleansing and dressings; Future  -     Wound compression and edema control; Future  -     Wound home care; Future  -     Wound cleansing and dressings;  Future    Chronic anticoagulation  -     lidocaine (XYLOCAINE) 4 % topical solution 5 mL  - Cancel: Wound cleansing and dressings; Future  -     Wound compression and edema control; Future  -     Wound home care; Future  -     Wound cleansing and dressings; Future    Chronic diastolic CHF (congestive heart failure) (HCC)  -     lidocaine (XYLOCAINE) 4 % topical solution 5 mL  -     Cancel: Wound cleansing and dressings; Future  -     Wound compression and edema control; Future  -     Wound home care; Future  -     Wound cleansing and dressings; Future    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HCC)  -     lidocaine (XYLOCAINE) 4 % topical solution 5 mL  -     Cancel: Wound cleansing and dressings; Future  -     Wound compression and edema control; Future  -     Wound home care; Future  -     Wound cleansing and dressings; Future              Debridement   Wound 09/19/23 Traumatic Leg Left;Lateral    Universal Protocol:  Consent: Verbal consent obtained. Risks and benefits: risks, benefits and alternatives were discussed  Consent given by: patient  Time out: Immediately prior to procedure a "time out" was called to verify the correct patient, procedure, equipment, support staff and site/side marked as required. Patient identity confirmed: verbally with patient      Performed by: physician  Debridement type: selective  Pain control: lidocaine 4%  Pre-debridement measurements  Length (cm): 4.4  Width (cm): 3.2  Depth (cm): 0.2  Surface Area (cm^2): 14.08  Volume (cm^3): 2.82    Post-debridement measurements  Length (cm): 4.4  Width (cm): 3.2  Depth (cm): 0.2  Percent debrided: 100%  Surface Area (cm^2): 14.08  Area debrided (cm^2): 14.08  Volume (cm^3): 2.82  Devitalized tissue debrided: biofilm and exudate  Instrument(s) utilized: curette  Bleeding: small  Hemostasis obtained with: pressure  Procedural pain (0-10): 1  Post-procedural pain: 0   Response to treatment: procedure was tolerated well    Debridement   Wound 09/26/23 Traumatic Leg Right; Anterior    Universal Protocol:  Consent: Verbal consent obtained. Risks and benefits: risks, benefits and alternatives were discussed  Consent given by: patient  Time out: Immediately prior to procedure a "time out" was called to verify the correct patient, procedure, equipment, support staff and site/side marked as required. Patient identity confirmed: verbally with patient      Performed by: physician  Debridement type: selective  Pain control: lidocaine 4%  Pre-debridement measurements  Length (cm): 0.8  Width (cm): 1  Depth (cm): 0.1  Surface Area (cm^2): 0.8  Volume (cm^3): 0.08    Post-debridement measurements  Length (cm): 0.8  Width (cm): 1  Depth (cm): 0.1  Percent debrided: 90%  Surface Area (cm^2): 0.8  Area debrided (cm^2): 0.72  Volume (cm^3): 0.08  Devitalized tissue debrided: biofilm and exudate  Instrument(s) utilized: curette  Bleeding: small  Hemostasis obtained with: pressure  Procedural pain (0-10): 1  Post-procedural pain: 0   Response to treatment: procedure was tolerated well          Plan:  • It was a pleasure to see Danitza Alcantaraes for wound care follow up today  • Selective debridement performed today as above to both wounds  • Wounds are improving   • Continue plan of care as noted below with polymem and rolled gauze to both lower extremities. Spandi  for gentle compression. • No signs or symptoms of infection today. Patient understands that if any signs of infection start (such as increased redness, drainage, pain, fever, chills, diaphoresis), they should call our office or proceed to the ER or Urgent Care. • Patient should continue a high protein diet to facilitate wound healing  • Patient is advised to not submerge wound or leave wound open to air. • Follow up in 2 weeks  • Given the multi-factorial nature of wound care, additional time was taken to review patient's treatment plan with other specialties and most recent pertinent lab work and imaging.    • All plans of care discussed with patient at bedside who verbalized understanding with treatment plan. Wound 09/19/23 Traumatic Leg Left;Lateral (Active)   Wound Image Images linked 10/10/23 1016   Wound Description Yellow;Slough; Beefy red;Hypergranulation 10/10/23 1016   Dorys-wound Assessment Intact; Other (Comment); Maceration;Edema; Hyperpigmented 10/10/23 1016   Wound Length (cm) 4.4 cm 10/10/23 1016   Wound Width (cm) 3.2 cm 10/10/23 1016   Wound Depth (cm) 0.2 cm 10/10/23 1016   Wound Surface Area (cm^2) 14.08 cm^2 10/10/23 1016   Wound Volume (cm^3) 2.816 cm^3 10/10/23 1016   Calculated Wound Volume (cm^3) 2.82 cm^3 10/10/23 1016   Change in Wound Size % -4.44 10/10/23 1016   Drainage Amount Moderate 10/10/23 1016   Drainage Description Serosanguineous; Yellow 10/10/23 1016   Non-staged Wound Description Full thickness 10/10/23 1016   Dressing Status Intact; New drainage; Old drainage (upon arrival) 10/10/23 1016       Wound 09/26/23 Traumatic Leg Right; Anterior (Active)   Wound Image Images linked 10/10/23 1014   Wound Description Dry;Brown;Eschar 10/10/23 1010   Dorys-wound Assessment Dry;Scaly 10/10/23 1010   Wound Length (cm) 0.8 cm 10/10/23 1010   Wound Width (cm) 1 cm 10/10/23 1010   Wound Depth (cm) 0.2 cm (post debridement) 10/10/23 1011   Wound Surface Area (cm^2) 0.8 cm^2 10/10/23 1010   Wound Volume (cm^3) 0.08 cm^3 10/10/23 1010   Calculated Wound Volume (cm^3) 0.08 cm^3 10/10/23 1010   Change in Wound Size % -60 10/10/23 1010   Drainage Amount Small 10/10/23 1010   Drainage Description Serous;Clear 10/10/23 1010   Non-staged Wound Description Full thickness 10/10/23 1010   Dressing Status -- (no drsg upon arrival) 10/10/23 1010       Wound 09/19/23 Traumatic Leg Left;Lateral (Active)   Date First Assessed/Time First Assessed: 09/19/23 0938   Primary Wound Type: Traumatic  Location: Leg  Wound Location Orientation: Left;Lateral       Wound 09/26/23 Traumatic Leg Right; Anterior (Active)   Date First Assessed/Time First Assessed: 09/26/23 0950   Primary Wound Type: Traumatic  Location: Leg  Wound Location Orientation: Right; Anterior       [REMOVED] Wound 08/17/20 Knee Left (Removed)   Resolved Date: 09/19/23  Date First Assessed/Time First Assessed: 08/17/20 0936   Location: Knee  Wound Location Orientation: Left  Wound Description (Comments): darryl stocking  Incision's 1st Dressing: ADHESIVE SKIN CLOSR DERMABOND PRINEO, DRESSING MEP. .. [REMOVED] Wound 03/28/22 Knee Right (Removed)   Resolved Date: 09/19/23  Date First Assessed/Time First Assessed: 03/28/22 0854   Location: Knee  Wound Location Orientation: Right  Wound Description (Comments): DARRYL stockings applied to bilateral lower legs  Incision's 1st Dressing: ADHESIVE SKIN HI. .. [REMOVED] Wound 04/11/22 Surgical Knee Right (Removed)   Resolved Date: 09/19/23  Date First Assessed/Time First Assessed: 04/11/22 2000   Primary Wound Type: Surgical  Location: Knee  Wound Location Orientation: Right  Wound Outcome: Unknown (No longer present)       [REMOVED] Wound 09/05/23 Catheter entry/exit site Wrist Right (Removed)   Resolved Date: 09/19/23  Date First Assessed/Time First Assessed: 09/05/23 0921   Traumatic Wound Type: Catheter entry/exit site  Location: Wrist  Wound Location Orientation: Right  Wound Outcome: Unknown (No longer present)       [REMOVED] Wound 09/05/23 Skin Tear Pretibial Left; Outer (Removed)   Resolved Date: 09/19/23  Date First Assessed/Time First Assessed: 09/05/23 0730   Present on Original Admission: Yes  Primary Wound Type: Skin Tear  Location: Pretibial  Wound Location Orientation: Left; Outer  Wound Outcome: (c) Other (Comment)       Subjective:      .    10/10/23: Wound care being performed by patient's son and VNA who are coming twice per week. Patient notes that she did have increased leg swelling since her last visit. Overall happy with wound healing    9/26/23: Dressing changes from VNA and her son. Denies any local or systemic symptoms of infection today. Happy with wound healing. Has been elevating legs.     9/19/23: Lisbet Jensen is a pleasant 33-year-old female with a past medical history of type 2 diabetes mellitus most recent A1c 5.8% 1 month ago, diabetic polyneuropathy, atrial  fibrillation on Xarelto, obesity, history of heart failure with most recent echocardiogram showing normal function here today for initial wound care consult. Per chart review patient sustained the wound of her left lower extremity on August 21, 2023 when she closed a car door on her leg. She went the next day to her PCP and was advised to apply warm compresses. She has been seen in the ED multiple times for wound checks. Initially she has been put on clindamycin and given Tdap vaccination. Wound culture taken which grew 1+ staph coag negative, and later wound culture grew 1+ Staph epidermidis during inpatient stay. She was admitted for 3 days from 9/5 to 9/8/2023 at [facility] and underwent bedside I&D with placement of wound VAC by podiatry. Arterial duplex showed no significant arterial disease however PVR tracings were dampened. Great toe pressure  within healing range. Blood cultures were negative. She is instructed to follow-up outpatient with podiatry and VNA was set up for patient. Recently patient was seen again in ER on September 15, 2023 when there was concern again for infection of the wound. She was prescribed clindamycin and referred to wound management. Referral was placed by physician assistant Alessandro Mendez. Today patient continues to take clindamycin as directed. Denies any local or systemic signs of infection including fever chills diaphoresis.    9/5/23: LEADs  RIGHT LOWER LIMB:  No evidence of significant lower extremity arterial occlusive disease. Ankle/Brachial index: 2.11 which is unreliable due to non compressible vessels. PVR/ PPG tracings are dampened.   Metatarsal pressure of 109 mmHg  Great toe pressure of 81 mmHg, within the healing range.     LEFT LOWER LIMB:  No evidence of significant lower extremity arterial occlusive disease. Ankle/Brachial index: 2.11 which is unreliable due to non compressible vessels. PVR/ PPG tracings are dampened. Metatarsal pressure of 152 mmHg  Great toe pressure of 92 mmHg, within the healing range. The following portions of the patient's history were reviewed and updated as appropriate: allergies, current medications, past family history, past medical history, past social history, past surgical history and problem list.    Review of Systems   Constitutional: Negative for chills, fatigue and fever. Skin: Positive for wound. All other systems reviewed and are negative. Objective:       Wound 09/19/23 Traumatic Leg Left;Lateral (Active)   Wound Image Images linked 10/10/23 1016   Wound Description Yellow;Slough; Beefy red;Hypergranulation 10/10/23 1016   Dorys-wound Assessment Intact; Other (Comment); Maceration;Edema; Hyperpigmented 10/10/23 1016   Wound Length (cm) 4.4 cm 10/10/23 1016   Wound Width (cm) 3.2 cm 10/10/23 1016   Wound Depth (cm) 0.2 cm 10/10/23 1016   Wound Surface Area (cm^2) 14.08 cm^2 10/10/23 1016   Wound Volume (cm^3) 2.816 cm^3 10/10/23 1016   Calculated Wound Volume (cm^3) 2.82 cm^3 10/10/23 1016   Change in Wound Size % -4.44 10/10/23 1016   Drainage Amount Moderate 10/10/23 1016   Drainage Description Serosanguineous; Yellow 10/10/23 1016   Non-staged Wound Description Full thickness 10/10/23 1016   Dressing Status Intact; New drainage; Old drainage (upon arrival) 10/10/23 1016       Wound 09/26/23 Traumatic Leg Right; Anterior (Active)   Wound Image Images linked 10/10/23 1014   Wound Description Dry;Brown;Eschar 10/10/23 1010   Dorys-wound Assessment Dry;Scaly 10/10/23 1010   Wound Length (cm) 0.8 cm 10/10/23 1010   Wound Width (cm) 1 cm 10/10/23 1010   Wound Depth (cm) 0.2 cm (post debridement) 10/10/23 1011   Wound Surface Area (cm^2) 0.8 cm^2 10/10/23 1010   Wound Volume (cm^3) 0.08 cm^3 10/10/23 1010   Calculated Wound Volume (cm^3) 0.08 cm^3 10/10/23 1010   Change in Wound Size % -60 10/10/23 1010   Drainage Amount Small 10/10/23 1010   Drainage Description Serous;Clear 10/10/23 1010   Non-staged Wound Description Full thickness 10/10/23 1010   Dressing Status -- (no drsg upon arrival) 10/10/23 1010       /84   Pulse 76   Temp (!) 97.4 °F (36.3 °C) (Temporal)     Physical Exam  Vitals reviewed. Constitutional:       Appearance: Normal appearance. HENT:      Head: Normocephalic and atraumatic. Mouth/Throat:      Mouth: Mucous membranes are moist.   Eyes:      Extraocular Movements: Extraocular movements intact. Pulmonary:      Effort: Pulmonary effort is normal.   Musculoskeletal:      Right lower leg: Edema present. Left lower leg: Edema present. Skin:     Comments: Bilateral lower extremity wounds both smaller than last exam.  Left lower extremity lateral wound with significantly less fibrin deposition and healthier wound bed than prior exam.  No signs of infection. Neurological:      Mental Status: She is alert. Psychiatric:         Mood and Affect: Mood normal.         Behavior: Behavior normal.                     Wound Instructions:  Orders Placed This Encounter   Procedures   • Wound compression and edema control     Elastic Tubular Stocking size F     Tubular elastic bandage: Apply from base of toes to behind the knee. Apply in AM, may remove for sleep. Avoid prolonged standing in one place. Elevate leg(s) above the level of the heart when sitting or as much as possible.                    Standing Status:   Future     Standing Expiration Date:   10/10/2024   • Wound home care     Continue home care for wound assess and care.   Wound care dressing changes 3x week as ordered     Standing Status:   Future     Standing Expiration Date:   10/10/2024   • Wound cleansing and dressings     Right and left lower leg wounds:    Wash your hands with soap and water.  Remove old dressing, discard into plastic bag and place in trash. Cleanse the wound with mild soap and water prior to applying a clean dressing. Do not use tissue or cotton balls. Do not scrub the wound. Pat dry using gauze. Shower yes prior to dressing change. Apply moisturizer to skin surrounding wound  Apply polymem max AG to the right and left leg lower wound cut to size of wounds - not on good skin please. Cover with gauze  Secure with oscar and tape  Change dressing 3x week                        This was done today     Standing Status:   Future     Standing Expiration Date:   10/10/2024   • Debridement     This order was created via procedure documentation   • Debridement     This order was created via procedure documentation        Diagnosis ICD-10-CM Associated Orders   1. Traumatic open wound of right lower leg, initial encounter  S81.801A lidocaine (XYLOCAINE) 4 % topical solution 5 mL     Wound compression and edema control     Wound home care     Wound cleansing and dressings     Debridement      2. Traumatic open wound of left lower leg, initial encounter  S81.802A lidocaine (XYLOCAINE) 4 % topical solution 5 mL     Wound compression and edema control     Wound home care     Wound cleansing and dressings     Debridement      3. Lymphedema of both lower extremities  I89.0 lidocaine (XYLOCAINE) 4 % topical solution 5 mL     Wound compression and edema control     Wound home care     Wound cleansing and dressings      4. Lipedema  R60.9 lidocaine (XYLOCAINE) 4 % topical solution 5 mL     Wound compression and edema control     Wound home care     Wound cleansing and dressings      5. Chronic venous hypertension (idiopathic) with ulcer of right lower extremity (CODE) ([facility])  I87.311 lidocaine (XYLOCAINE) 4 % topical solution 5 mL     Wound compression and edema control     Wound home care     Wound cleansing and dressings      6.  Chronic anticoagulation  Z79.01 lidocaine (XYLOCAINE) 4 % topical solution 5 mL     Wound compression and edema control     Wound home care     Wound cleansing and dressings      7. Chronic diastolic CHF (congestive heart failure) ([facility])  I50.32 lidocaine (XYLOCAINE) 4 % topical solution 5 mL     Wound compression and edema control     Wound home care     Wound cleansing and dressings      8. 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[facility])  I70.209 lidocaine (XYLOCAINE) 4 % topical solution 5 mL     Wound compression and edema control     Wound home care     Wound cleansing and dressings        --  Bharathi Hogan MD    "This note has been constructed using a voice recognition system. Therefore there may be syntax, spelling, and/or grammatical errors. Occasional wrong word or "sound alike" substitutions may have occurred due to the inherent limitations of voice recognition software. Read the chart carefully and recognize, using context, where substitutions have occurred.  Please call if you have any questions."

## 2023-10-10 NOTE — PATIENT INSTRUCTIONS
Orders Placed This Encounter   Procedures    Wound compression and edema control     Elastic Tubular Stocking size F     Tubular elastic bandage: Apply from base of toes to behind the knee. Apply in AM, may remove for sleep. Avoid prolonged standing in one place. Elevate leg(s) above the level of the heart when sitting or as much as possible. Standing Status:   Future     Standing Expiration Date:   10/10/2024    Wound home care     Continue home care for wound assess and care. Wound care dressing changes 3x week as ordered     Standing Status:   Future     Standing Expiration Date:   10/10/2024    Wound cleansing and dressings     Right and left lower leg wounds:    Wash your hands with soap and water. Remove old dressing, discard into plastic bag and place in trash. Cleanse the wound with mild soap and water prior to applying a clean dressing. Do not use tissue or cotton balls. Do not scrub the wound. Pat dry using gauze. Shower yes prior to dressing change. Apply moisturizer to skin surrounding wound  Apply polymem max AG to the right and left leg lower wound cut to size of wounds - not on good skin please.     Cover with gauze  Secure with oscar and tape  Change dressing 3x week                        This was done today     Standing Status:   Future     Standing Expiration Date:   10/10/2024

## 2023-10-10 NOTE — LETTER
[facility]  [address] [facility] [address]  Phone#  [phone]  Fax#  [phone]    Patient:  Paola Horne  YOB: 1940  Phone:  [phone]  Date of Visit:  10/10/2023    Orders Placed This Encounter   Procedures   • Wound compression and edema control     Elastic Tubular Stocking size F     Tubular elastic bandage: Apply from base of toes to behind the knee. Apply in AM, may remove for sleep. Avoid prolonged standing in one place. Elevate leg(s) above the level of the heart when sitting or as much as possible. Standing Status:   Future     Standing Expiration Date:   10/10/2024   • Wound home care     Continue home care for wound assess and care. Wound care dressing changes 3x week as ordered     Standing Status:   Future     Standing Expiration Date:   10/10/2024   • Wound cleansing and dressings     Right and left lower leg wounds:    Wash your hands with soap and water. Remove old dressing, discard into plastic bag and place in trash. Cleanse the wound with mild soap and water prior to applying a clean dressing. Do not use tissue or cotton balls. Do not scrub the wound. Pat dry using gauze. Shower yes prior to dressing change. Apply moisturizer to skin surrounding wound  Apply polymem max AG to the right and left leg lower wound cut to size of wounds - not on good skin please.     Cover with gauze  Secure with oscar and tape  Change dressing 3x week                        This was done today     Standing Status:   Future     Standing Expiration Date:   10/10/2024   • Debridement     This order was created via procedure documentation   • Debridement     This order was created via procedure documentation         Electronically signed by Mita German MD

## 2023-10-11 ENCOUNTER — OFFICE VISIT (OUTPATIENT)
Age: 83
End: 2023-10-11
Payer: MEDICARE

## 2023-10-11 VITALS
HEIGHT: 64 IN | WEIGHT: 228 LBS | BODY MASS INDEX: 38.93 KG/M2 | HEART RATE: 87 BPM | SYSTOLIC BLOOD PRESSURE: 120 MMHG | DIASTOLIC BLOOD PRESSURE: 81 MMHG

## 2023-10-11 DIAGNOSIS — E11.42 DIABETIC POLYNEUROPATHY ASSOCIATED WITH TYPE 2 DIABETES MELLITUS (HCC): Primary | ICD-10-CM

## 2023-10-11 DIAGNOSIS — B35.1 ONYCHOMYCOSIS: ICD-10-CM

## 2023-10-11 DIAGNOSIS — I87.2 DEEP VENOUS INSUFFICIENCY: ICD-10-CM

## 2023-10-11 PROCEDURE — 11721 DEBRIDE NAIL 6 OR MORE: CPT | Performed by: STUDENT IN AN ORGANIZED HEALTH CARE EDUCATION/TRAINING PROGRAM

## 2023-10-11 PROCEDURE — 99213 OFFICE O/P EST LOW 20 MIN: CPT | Performed by: STUDENT IN AN ORGANIZED HEALTH CARE EDUCATION/TRAINING PROGRAM

## 2023-10-11 RX ORDER — PANTOPRAZOLE SODIUM 20 MG/1
20 TABLET, DELAYED RELEASE ORAL DAILY
COMMUNITY

## 2023-10-11 RX ORDER — ASCORBIC ACID 500 MG
500 TABLET ORAL 2 TIMES DAILY
COMMUNITY
Start: 2023-09-28

## 2023-10-11 NOTE — PATIENT INSTRUCTIONS
-Purchase an over the counter pair of inserts such as powersteps, superfeet, tread labs    Diabetes and Your Skin   WHAT YOU NEED TO KNOW:   Diabetes can affect every part of your body, including your skin. Diabetes that is not well controlled can damage blood vessels and nerves. Damage to blood vessels can make it hard for blood to flow to tissues and organs. A lack of blood flow to your skin can cause ulcers that are difficult to heal. Skin ulcers are also called sores. People with diabetes can also have more bacterial skin infections than other people. Most skin conditions can be prevented with good blood sugar control. Skin sores can be hard to heal, or become life or limb-threatening, if not treated early. DISCHARGE INSTRUCTIONS:   Call your doctor or care team provider if:   You get a severe burn or cut. You have a sore that is painful, warm to the touch, or has redness around it. Your sore does not get better or seems to get worse. You have a fever. Your blood sugar levels continue to be higher than they should be. You have questions or concerns about your condition or care. Prevent skin sores:   Keep your blood sugars within target range. Your care team provider will tell you what your blood sugar levels should be. High blood sugar levels increase your risk for skin infections and poor wound healing. Keep your skin clean. Do not take hot baths or showers. They can cause your skin to get dry. Do not take a bubble bath if you have dry skin. Use moisturizing soaps. Keep your skin from becoming too dry. Apply moisturizing lotion after baths or showers, especially in cold, dry weather. When you scratch dry, itchy skin, you can cause your skin to be open to infection. Bathe less during cold weather and use lotion to moisturize. Do not put lotion between your toes. Moisture between your toes could lead to skin breakdown. Use a humidifier to keep air in your home from being dry.     Keep areas where skin touches skin dry. Use talcum powder in areas such as armpits and groin. You may also need it under your breasts, and between your toes. Moisture in these areas can cause a fungal infection. Treat cuts immediately. Clean minor cuts with soap and water. Cover them with sterile gauze. Follow up with your doctor or diabetes care team providers as directed:  Write down your questions so you remember to ask them during your visits. © Copyright [employer] 2023 Information is for End User's use only and may not be sold, redistributed or otherwise used for commercial purposes. The above information is an  only. It is not intended as medical advice for individual conditions or treatments. Talk to your doctor, nurse or pharmacist before following any medical regimen to see if it is safe and effective for you.

## 2023-10-12 NOTE — PROGRESS NOTES
This patient was seen on 10/11/23. My role is Foot , Ankle, and Wound Specialist    ASSESSMENT     Diagnoses and all orders for this visit:    Diabetic polyneuropathy associated with type 2 diabetes mellitus ([address])    Deep venous insufficiency    Onychomycosis    Other orders  -     ascorbic acid (VITAMIN C) 500 MG tablet; Take 500 mg by mouth 2 (two) times a day  -     pantoprazole (PROTONIX) 20 mg tablet; Take 20 mg by mouth daily         Problem List Items Addressed This Visit          Endocrine    Diabetic polyneuropathy associated with type 2 diabetes mellitus ([address]) - Primary       Cardiovascular and Mediastinum    Deep venous insufficiency       Musculoskeletal and Integument    Onychomycosis       PLAN  -At-risk diabetic foot examination performed.  -I educated patient on proper foot care including wearing good sneakers, refraining from walking barefoot, and self-examining feet every day.   -Debrided each mycotic toenail reducing nails in thickness and removing devitalized tissue and subungual debris. Trimmed remaining elongated toenails. This was performed utilizing a combination of large nail nipper and electric dania. (Q8)  -Diabetic Risk category 2  -RTC in 3-months    SUBJECTIVE    Chief Complaint:  Here for at-risk diabetic foot care     Patient ID: Luke Feliz is a 82 y.o. female. 10/11/2023: Negin Rivera is a pleasant 71-year-old female who presents today with elongated, thickened, discolored toenails x10. She also describes foot pain, soreness while walking. The following portions of the patient's history were reviewed and updated as appropriate: allergies, current medications, past family history, past medical history, past social history, past surgical history and problem list.    Review of Systems   Constitutional: Negative. HENT: Negative. Respiratory: Negative. Cardiovascular: Negative. Gastrointestinal: Negative. Musculoskeletal: Negative.     Skin:  Positive for color change. Neurological: Negative. OBJECTIVE      /81   Pulse 87   Ht 5' 4" (1.626 m)   Wt 103 kg (228 lb)   BMI 39.14 kg/m²        Physical Exam  Constitutional:       Appearance: Normal appearance. HENT:      Head: Normocephalic and atraumatic. Eyes:      General:         Right eye: No discharge. Left eye: No discharge. Cardiovascular:      Rate and Rhythm: Normal rate and regular rhythm. Pulses:           Dorsalis pedis pulses are 0 on the right side and 0 on the left side. Posterior tibial pulses are 2+ on the right side and 2+ on the left side. Pulmonary:      Effort: Pulmonary effort is normal.      Breath sounds: Normal breath sounds. Musculoskeletal:      Right lower le+ Pitting Edema present. Left lower le+ Pitting Edema present. Feet:      Right foot:      Protective Sensation: 10 sites tested. 8 sites sensed. Toenail Condition: Right toenails are abnormally thick and long. Fungal disease present. Left foot:      Protective Sensation: 10 sites tested. 8 sites sensed. Toenail Condition: Left toenails are abnormally thick and long. Fungal disease present. Skin:     General: Skin is warm. Capillary Refill: Capillary refill takes less than 2 seconds. Neurological:      Sensory: Sensation is intact. No sensory deficit. Multiple venous varicosities noted throughout the bilateral lower extremities.     Q8 findings include absent dorsalis pedis pulses, toenail discoloration and thickening skin color changes, absent of pedal hair

## 2023-10-20 ENCOUNTER — OFFICE VISIT (OUTPATIENT)
Dept: CARDIOLOGY CLINIC | Facility: CLINIC | Age: 83
End: 2023-10-20
Payer: MEDICARE

## 2023-10-20 VITALS
DIASTOLIC BLOOD PRESSURE: 88 MMHG | WEIGHT: 225.5 LBS | HEART RATE: 94 BPM | BODY MASS INDEX: 38.5 KG/M2 | OXYGEN SATURATION: 95 % | SYSTOLIC BLOOD PRESSURE: 118 MMHG | HEIGHT: 64 IN

## 2023-10-20 DIAGNOSIS — I48.20 CHRONIC ATRIAL FIBRILLATION (HCC): Primary | ICD-10-CM

## 2023-10-20 PROCEDURE — 93000 ELECTROCARDIOGRAM COMPLETE: CPT | Performed by: INTERNAL MEDICINE

## 2023-10-20 PROCEDURE — 99214 OFFICE O/P EST MOD 30 MIN: CPT | Performed by: INTERNAL MEDICINE

## 2023-10-20 NOTE — PROGRESS NOTES
Cardiology Follow Up    Thalia Hancock [facility]  1940  [id number]  [facility] [place]  [address] [facility]  [address]  [address]  [id number] [phone]    1. Chronic atrial fibrillation (HCC)  POCT ECG          Interval History: Followup afib    Recent LE wound being treated and TAVR workup pending this to heal. No chest pain. Medical Problems       Problem List       Atrial fibrillation [facility]) [I48.91]    Overview Addendum 8/30/2023  7:55 AM by Triston Roldan MD     Description: s/p 2 unsuccessful ablation, s/p 2 cardioversion last in 2010, s/p pacemaker. ; on anticoagulation followed by cardiology warfarin  Xarelto 7/23         Hiatal hernia    Acquired deformity of foot    Corns    Diabetic polyneuropathy associated with type 2 diabetes mellitus ([address])    Overview Signed 3/12/2018 12:21 PM by Triston Roldan MD     ?duloxetine             Lab Results   Component Value Date    HGBA1C 5.8 (H) 08/22/2023         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[address])    Radiculopathy of lumbar region    Sensory polyneuropathy    RLS (restless legs syndrome)    Arthritis    Essential hypertension    Overview Signed 12/22/2020 12:51 PM by Triston Roldan MD     lisinopril         Lichen sclerosus et atrophicus    Multiple lung nodules    Overview Addendum 6/22/2020 10:16 AM by Triston Roldan MD     Stable, no further CT. Last CT 2016         Severe obesity (BMI 35.0-39. 9) with comorbidity ([address])    Osteopenia of multiple sites    Peripheral neuropathy    Overview Signed 3/10/2018  8:59 AM by Triston Roldan MD     Transitioned From: Neuropathy         Vitamin D deficiency    Dense breast tissue on mammogram    Difficulty walking    Flat foot    Onychomycosis    Carrero's esophagus with dysplasia    Overview Signed 6/3/2021  8:14 AM by Triston Roldan MD     EGD 7/20         Mild cognitive impairment    Pacemaker    GERD (gastroesophageal reflux disease)    Chronic edema    Deep venous insufficiency    Colon polyps    Lichen sclerosus et atrophicus of the vulva    Status post total knee replacement using cement, left    Leg swelling    Seasonal allergies    Exotropia of right eye    Stage 3a chronic kidney disease (HCC)    Lab Results   Component Value Date    EGFR 49 09/08/2023    EGFR 47 09/07/2023    EGFR 69 09/06/2023    CREATININE 1.06 09/08/2023    CREATININE 1.10 09/07/2023    CREATININE 0.80 09/06/2023         Status post total knee replacement using cement, right    Chronic anticoagulation    Chronic diastolic CHF (congestive heart failure) ([address])    Wt Readings from Last 3 Encounters:   10/20/23 102 kg (225 lb 8 oz)   10/11/23 103 kg (228 lb)   09/27/23 104 kg (228 lb 12.8 oz)                 Aortic stenosis, severe    Chronic left shoulder pain    Non-healing wound of left lower extremity    Overview Signed 9/21/2023  1:17 PM by Dilip Meade MD     Wound vac             Past Medical History:   Diagnosis Date    Arthritis     Atrial fibrillation ([address])     Carrero's esophagus     last assessed: 1/23/2018    BRCA1 negative     BRCA2 negative     Breast cancer ([address]) 2006    stage 1 (left), given adjuvant radiation with Arimidex x 5 years    Cancer ([address]) 2006    Left Breast, Lumpectomy    Cataract     last assessed: 3/11/2014    Chronic diastolic CHF (congestive heart failure) ([address]) 11/21/2022    Dysplasia of toenail     last assessed: 8/29/2017    Esophageal reflux     GERD (gastroesophageal reflux disease)     Gross hematuria     last assessed: 2/19/2015    Hematuria     Hiatal hernia     History of radiation therapy     Hypertension     Irregular heart beat     AFIB    Mixed sensory-motor polyneuropathy     Neuropathy     Obesity     Pacemaker     Paroxysmal atrial fibrillation (HCC)     Peripheral neuropathy     Rectal bleeding     Restless leg syndrome     Shortness of breath     last assessed: 1/11/2016     Social History Socioeconomic History    Marital status: /Civil Union     Spouse name: Not on file    Number of children: 3    Years of education: 12    Highest education level: High school graduate   Occupational History    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      Comment: retired   Tobacco Use    Smoking status: Former     Packs/day: 1.00     Years: 25.00     Total pack years: 25.00     Types: Cigarettes     Quit date: 1980     Years since quittin.1    Smokeless tobacco: Never    Tobacco comments:     Quit over 30 years ago; quit age 41   Vaping Use    Vaping Use: Never used   Substance and Sexual Activity    Alcohol use: Not Currently    Drug use: No    Sexual activity: Not on file   Other Topics Concern    Not on file   Social History Narrative         Social Determinants of Health     Financial Resource Strain: Low Risk  (2022)    Overall Financial Resource Strain (CARDIA)     Difficulty of Paying Living Expenses: Not very hard   Food Insecurity: No Food Insecurity (2023)    Hunger Vital Sign     Worried About Running Out of Food in the Last Year: Never true     [address] in the Last Year: Never true   Transportation Needs: No Transportation Needs (2023)    PRAPARE - Transportation     Lack of Transportation (Medical): No     Lack of Transportation (Non-Medical):  No   Physical Activity: Not on file   Stress: Not on file   Social Connections: Not on file   Intimate Partner Violence: Not on file   Housing Stability: Unknown (2023)    Housing Stability Vital Sign     Unable to Pay for Housing in the Last Year: No     Number of Places Lived in the Last Year: Not on file     Unstable Housing in the Last Year: No      Family History   Problem Relation Age of Onset    Hypertension Mother     Heart disease Father     Aneurysm Father     Coronary artery disease Father         in his 76s with aneurysm    Aortic aneurysm Father         abdominal    Scleroderma Sister Breast cancer Sister 76    Hypertension Sister     Cancer Sister     No Known Problems Son     No Known Problems Son     Testicular cancer Son 39    Thyroid cancer Son 45    Colon cancer Maternal Aunt     Colon cancer Maternal Aunt     Breast cancer Other 48        kaylee's daughter    Alcohol abuse Neg Hx     Substance Abuse Neg Hx     Mental illness Neg Hx     Depression Neg Hx      Past Surgical History:   Procedure Laterality Date    BREAST BIOPSY Left 2006    BREAST LUMPECTOMY Left 2006    onset: 2006    BREAST SURGERY      CARDIAC CATHETERIZATION N/A 9/5/2023    Procedure: Cardiac RHC/LHC; Surgeon: Claire Alexandra MD;  Location: [facility]; Service: Cardiology    CARDIAC CATHETERIZATION N/A 9/5/2023    Procedure: Cardiac Coronary Angiogram;  Surgeon: Claire Alexandra MD;  [facility]: [facility]; Service: Cardiology    CARDIAC CATHETERIZATION  9/5/2023    Procedure: Cardiac catheterization;  Surgeon: Claire Alexandra MD;  Location: [facility]; Service: Cardiology    CARDIAC PACEMAKER PLACEMENT      x 3 2006    CATARACT EXTRACTION      COLONOSCOPY      CYSTOSCOPY  04/04/2014    diagnostic    HYSTERECTOMY      ALISON BSO; due to fibroid uterus; age 38    KNEE CARTILAGE SURGERY      excision lesion of meniscus or capsule knee    KNEE SURGERY      OOPHORECTOMY Bilateral     age 38    OTHER SURGICAL HISTORY      radiation therapy    NV ARTHRP KNE CONDYLE&PLATU MEDIAL&LAT COMPARTMENTS Left 08/17/2020    Procedure: ARTHROPLASTY KNEE TOTAL;  Surgeon: Chester Harris DO;  Location: [facility];  Service: Orthopedics    NV ARTHRP KNE CONDYLE&PLATU MEDIAL&LAT COMPARTMENTS Right 03/28/2022    Procedure: ARTHROPLASTY KNEE TOTAL W ROBOT - RIGHT;  Surgeon: Chester Harris DO;  Location: [facility];  Service: Orthopedics    NV COLONOSCOPY FLX DX W/COLLJ SPEC WHEN PFRMD N/A 02/08/2017    Procedure: COLONOSCOPY;  Surgeon: Yeni Momin MD;  Location:  GI LAB;   Service: Gastroenterology    NV ESOPHAGOGASTRODUODENOSCOPY TRANSORAL DIAGNOSTIC N/A 09/20/2017    Procedure: ESOPHAGOGASTRODUODENOSCOPY (EGD); Surgeon: Leonora Stoddard MD;  Location:  GI LAB; Service: Gastroenterology    UPPER GASTROINTESTINAL ENDOSCOPY         Current Outpatient Medications:     acetaminophen (TYLENOL) 325 mg tablet, Take 3 tablets (975 mg total) by mouth every 8 (eight) hours (Patient taking differently: Take 975 mg by mouth every 8 (eight) hours As Needed), Disp: , Rfl: 0    ascorbic acid (VITAMIN C) 500 MG tablet, Take 500 mg by mouth 2 (two) times a day, Disp: , Rfl:     Calcium Acetate, Phos Binder, (CALCIUM ACETATE PO), Take by mouth daily  , Disp: , Rfl:     clobetasol (TEMOVATE) 0.05 % ointment, Apply once weekly to the affected area, Disp: 30 g, Rfl: 3    famotidine (PEPCID) 40 MG tablet, TAKE 1 TABLET BY MOUTH EVERY DAY, Disp: 90 tablet, Rfl: 1    fluticasone (FLONASE) 50 mcg/act nasal spray, SPRAY 1 SPRAY INTO EACH NOSTRIL EVERY DAY, Disp: 48 mL, Rfl: 3    gabapentin (NEURONTIN) 800 mg tablet, TAKE 1 TABLET BY MOUTH FOUR TIMES A DAY, Disp: 360 tablet, Rfl: 1    loratadine (CLARITIN) 10 mg tablet, Take 10 mg by mouth daily, Disp: , Rfl:     magnesium 30 MG tablet, Take 30 mg by mouth in the morning, Disp: , Rfl:     metoprolol tartrate (LOPRESSOR) 50 mg tablet, TAKE 1 AND 1/2 TABLETS BY MOUTH 2 TIMES A DAY, Disp: 270 tablet, Rfl: 3    multivitamin (THERAGRAN) TABS, Take 1 tablet by mouth daily, Disp: , Rfl:     pantoprazole (PROTONIX) 20 mg tablet, Take 20 mg by mouth daily, Disp: , Rfl:     pramipexole (MIRAPEX) 0.5 mg tablet, TAKE 2 FULL TABLETS TWICE A DAY AT 5:00 P. M.  AND 10:00 P.M., Disp: 360 tablet, Rfl: 1    rivaroxaban (Xarelto) 20 mg tablet, Take 1 tablet (20 mg total) by mouth daily with breakfast, Disp: 90 tablet, Rfl: 3    torsemide (DEMADEX) 20 mg tablet, Take 1 tablet (20 mg total) by mouth daily, Disp: 90 tablet, Rfl: 3  Allergies   Allergen Reactions    Latex      Added based on information entered during case entry, please review and add reactions, type, and severity as needed    Cephalexin Rash    Duloxetine Hcl Other (See Comments)     Facial pins and needles sensation    Erythromycin Rash    Levofloxacin Other (See Comments)     Muscular aches    Penicillins Rash    Savella [Milnacipran] Rash    Sulfa Antibiotics Rash       Labs:     Chemistry        Component Value Date/Time     11/09/2015 1115    K 5.1 09/08/2023 0526    K 4.5 11/09/2015 1115    CL 99 09/08/2023 0526     11/09/2015 1115    CO2 21 09/08/2023 0526    CO2 30 (H) 11/09/2015 1115    BUN 30 (H) 09/08/2023 0526    BUN 20 11/09/2015 1115    CREATININE 1.06 09/08/2023 0526    CREATININE 0.8 11/09/2015 1115        Component Value Date/Time    CALCIUM 9.2 09/08/2023 0526    CALCIUM 9.0 11/09/2015 1115    ALKPHOS 68 09/05/2023 1432    ALKPHOS 61 11/09/2015 1115    AST 20 09/05/2023 1432    AST 23 11/09/2015 1115    ALT 11 09/05/2023 1432    ALT 31 11/09/2015 1115    BILITOT 0.5 11/09/2015 1115            Lab Results   Component Value Date    CHOL 162 11/09/2015     Lab Results   Component Value Date    HDL 38 (L) 08/22/2023    HDL 35 (L) 11/09/2022    HDL 45 10/25/2021     Lab Results   Component Value Date    LDLCALC 59 08/22/2023    LDLCALC 53 11/09/2022    [facility] 80 10/25/2021     Lab Results   Component Value Date    TRIG 83 08/22/2023    TRIG 94 11/09/2022    TRIG 69 10/25/2021     No results found for: "CHOLHDL"    Imaging: No results found. EKG: Atrial Fibrillation . Review of Systems   Constitutional: Negative. HENT: Negative. Eyes: Negative. Cardiovascular: Negative. Respiratory: Negative. Endocrine: Negative. Hematologic/Lymphatic: Negative. Skin: Negative. Musculoskeletal: Negative. Gastrointestinal: Negative. Genitourinary: Negative. Neurological: Negative. Psychiatric/Behavioral: Negative. Allergic/Immunologic: Negative.         Vitals:    10/20/23 1011   BP: 118/88   Pulse: 94 SpO2: 95%           Physical Exam  Vitals and nursing note reviewed. Constitutional:       Appearance: Normal appearance. HENT:      Head: Normocephalic. Nose: Nose normal.      Mouth/Throat:      Mouth: Mucous membranes are moist.   Eyes:      General: No scleral icterus. Conjunctiva/sclera: Conjunctivae normal.   Cardiovascular:      Rate and Rhythm: Normal rate and regular rhythm. Heart sounds: No murmur heard. No gallop. Pulmonary:      Effort: Pulmonary effort is normal. No respiratory distress. Breath sounds: Normal breath sounds. No wheezing or rales. Abdominal:      General: Abdomen is flat. Bowel sounds are normal. There is no distension. Palpations: Abdomen is soft. Tenderness: There is no abdominal tenderness. There is no guarding. Musculoskeletal:      Cervical back: Normal range of motion and neck supple. Right lower leg: No edema. Left lower leg: No edema. Skin:     General: Skin is warm and dry. Neurological:      General: No focal deficit present. Mental Status: She is alert and oriented to person, place, and time. Psychiatric:         Mood and Affect: Mood normal.         Behavior: Behavior normal.         Discussion/Summary:    Severe AS: TAVR workup started and on hold due to lower extremity wound that needs to heal first.     Persistent AF: Continue with metoprolol for rate control and continue AC with xarelto. S/P PPM implant. Continue device checks    Chronic diastolic CHF: in setting of severe AS. Continue torsemide. The patient was counseled regarding diagnostic results, instructions for management, risk factor reductions, impressions. total time of encounter was 25 minutes and 15 minutes was spent counseling.

## 2023-10-24 ENCOUNTER — OFFICE VISIT (OUTPATIENT)
Dept: WOUND CARE | Facility: HOSPITAL | Age: 83
End: 2023-10-24
Payer: MEDICARE

## 2023-10-24 VITALS — DIASTOLIC BLOOD PRESSURE: 85 MMHG | TEMPERATURE: 97.6 F | HEART RATE: 86 BPM | SYSTOLIC BLOOD PRESSURE: 125 MMHG

## 2023-10-24 DIAGNOSIS — R60.9 LIPEDEMA: ICD-10-CM

## 2023-10-24 DIAGNOSIS — I87.311 CHRONIC VENOUS HYPERTENSION (IDIOPATHIC) WITH ULCER OF RIGHT LOWER EXTREMITY (CODE) (HCC): ICD-10-CM

## 2023-10-24 DIAGNOSIS — I89.0 LYMPHEDEMA OF BOTH LOWER EXTREMITIES: ICD-10-CM

## 2023-10-24 DIAGNOSIS — S81.802A TRAUMATIC OPEN WOUND OF LEFT LOWER LEG, INITIAL ENCOUNTER: ICD-10-CM

## 2023-10-24 DIAGNOSIS — S81.801A TRAUMATIC OPEN WOUND OF RIGHT LOWER LEG, INITIAL ENCOUNTER: Primary | ICD-10-CM

## 2023-10-24 DIAGNOSIS — Z79.01 CHRONIC ANTICOAGULATION: ICD-10-CM

## 2023-10-24 DIAGNOSIS — I50.32 CHRONIC DIASTOLIC CHF (CONGESTIVE HEART FAILURE) (HCC): ICD-10-CM

## 2023-10-24 PROCEDURE — 97597 DBRDMT OPN WND 1ST 20 CM/<: CPT | Performed by: STUDENT IN AN ORGANIZED HEALTH CARE EDUCATION/TRAINING PROGRAM

## 2023-10-24 PROCEDURE — 97598 DBRDMT OPN WND ADDL 20CM/<: CPT | Performed by: STUDENT IN AN ORGANIZED HEALTH CARE EDUCATION/TRAINING PROGRAM

## 2023-10-24 RX ORDER — LIDOCAINE HYDROCHLORIDE 40 MG/ML
5 SOLUTION TOPICAL ONCE
Status: COMPLETED | OUTPATIENT
Start: 2023-10-24 | End: 2023-10-24

## 2023-10-24 RX ADMIN — LIDOCAINE HYDROCHLORIDE 5 ML: 40 SOLUTION TOPICAL at 10:15

## 2023-10-24 NOTE — LETTER
[address] [place] [address] WOUND CARE  [facility] [facility] [address] [facility] [address]  Phone#  [phone]  Fax#  [phone]    Patient:  Salud Ramirez  YOB: 1940  Phone:  [phone]  Date of Visit:  10/24/2023    Orders Placed This Encounter   Procedures   • Wound cleansing and dressings     Right and left lower leg wounds:     Wash your hands with soap and water. Remove old dressing, discard into plastic bag and place in trash. Cleanse the wound with mild soap and water prior to applying a clean dressing. Do not use tissue or cotton balls. Do not scrub the wound. Pat dry using gauze. Shower yes prior to dressing change. Apply moisturizer to skin surrounding wound  Apply Maxorb AG or equivalent silver alginate to the right and left leg lower wound cut to size of wounds - not on good skin please. Cover with gauze  Secure with oscar and tape - please no bandaids  Change dressing 3x week     Standing Status:   Future     Standing Expiration Date:   10/24/2024   • Wound compression and edema control     Elastic Tubular Stocking size F     Tubular elastic bandage: Apply from base of toes to behind the knee. Apply in AM, may remove for sleep. Avoid prolonged standing in one place. Elevate leg(s) above the level of the heart when sitting or as much as possible.      Standing Status:   Future     Standing Expiration Date:   10/24/2024   • Debridement     This order was created via procedure documentation   • Debridement     This order was created via procedure documentation         Electronically signed by Zeina Garland MD

## 2023-10-24 NOTE — PATIENT INSTRUCTIONS
Orders Placed This Encounter   Procedures    Wound cleansing and dressings     Right and left lower leg wounds:     Wash your hands with soap and water. Remove old dressing, discard into plastic bag and place in trash. Cleanse the wound with mild soap and water prior to applying a clean dressing. Do not use tissue or cotton balls. Do not scrub the wound. Pat dry using gauze. Shower yes prior to dressing change. Apply moisturizer to skin surrounding wound  Apply Maxorb AG or equivalent silver alginate to the right and left leg lower wound cut to size of wounds - not on good skin please. Cover with gauze  Secure with oscar and tape - please no bandaids  Change dressing 3x week     Standing Status:   Future     Standing Expiration Date:   10/24/2024    Wound compression and edema control     Elastic Tubular Stocking size F     Tubular elastic bandage: Apply from base of toes to behind the knee. Apply in AM, may remove for sleep. Avoid prolonged standing in one place. Elevate leg(s) above the level of the heart when sitting or as much as possible.      Standing Status:   Future     Standing Expiration Date:   10/24/2024

## 2023-10-24 NOTE — PROGRESS NOTES
Patient ID: Precious Wagoner is a 82 y.o. female Date of Birth 1940     Chief Complaint  Chief Complaint   Patient presents with    Follow Up Wound Care Visit     Open wound BLE       Allergies  Latex, Cephalexin, Duloxetine hcl, Erythromycin, Levofloxacin, Penicillins, Savella [milnacipran], and Sulfa antibiotics    Assessment:     Diagnoses and all orders for this visit:    Traumatic open wound of right lower leg, initial encounter  -     lidocaine (XYLOCAINE) 4 % topical solution 5 mL  -     Wound cleansing and dressings; Future  -     Wound compression and edema control; Future  -     Debridement    Traumatic open wound of left lower leg, initial encounter  -     lidocaine (XYLOCAINE) 4 % topical solution 5 mL  -     Wound cleansing and dressings; Future  -     Wound compression and edema control; Future  -     Debridement    Lymphedema of both lower extremities  -     lidocaine (XYLOCAINE) 4 % topical solution 5 mL  -     Wound cleansing and dressings; Future  -     Wound compression and edema control; Future    Chronic venous hypertension (idiopathic) with ulcer of right lower extremity (CODE) ([facility])  -     lidocaine (XYLOCAINE) 4 % topical solution 5 mL  -     Wound cleansing and dressings; Future  -     Wound compression and edema control; Future    Lipedema  -     lidocaine (XYLOCAINE) 4 % topical solution 5 mL  -     Wound cleansing and dressings; Future  -     Wound compression and edema control; Future    Chronic anticoagulation  -     lidocaine (XYLOCAINE) 4 % topical solution 5 mL  -     Wound cleansing and dressings; Future  -     Wound compression and edema control; Future    Chronic diastolic CHF (congestive heart failure) ([facility])  -     lidocaine (XYLOCAINE) 4 % topical solution 5 mL  -     Wound cleansing and dressings; Future  -     Wound compression and edema control;  Future              Debridement   Wound 09/19/23 Traumatic Leg Left;Lateral    Universal Protocol:  Consent: Verbal consent obtained. Risks and benefits: risks, benefits and alternatives were discussed  Consent given by: patient  Time out: Immediately prior to procedure a "time out" was called to verify the correct patient, procedure, equipment, support staff and site/side marked as required. Patient identity confirmed: verbally with patient    Performed by: physician  Debridement type: selective  Pain control: lidocaine 4%  Pre-debridement measurements  Length (cm): 4.2  Width (cm): 2.8  Depth (cm): 0.1  Surface Area (cm^2): 11.8  Volume (cm^3): 1.2    Post-debridement measurements  Length (cm): 4.2  Width (cm): 2.8  Depth (cm): 0.1  Percent debrided: 90%  Surface Area (cm^2): 11.8  Area debrided (cm^2): 10.6  Volume (cm^3): 1.2  Devitalized tissue debrided: biofilm and exudate  Instrument(s) utilized: curette  Bleeding: small  Hemostasis obtained with: pressure  Procedural pain (0-10): 1  Post-procedural pain: 0   Response to treatment: procedure was tolerated well    Debridement   Wound 09/26/23 Traumatic Leg Right; Anterior    Universal Protocol:  Consent: Verbal consent obtained. Risks and benefits: risks, benefits and alternatives were discussed  Consent given by: patient  Time out: Immediately prior to procedure a "time out" was called to verify the correct patient, procedure, equipment, support staff and site/side marked as required.   Patient identity confirmed: verbally with patient    Performed by: physician  Debridement type: selective  Pain control: lidocaine 4%  Pre-debridement measurements  Length (cm): 0.8  Width (cm): 0.5  Depth (cm): 0.1  Surface Area (cm^2): 0.4  Volume (cm^3): 0    Post-debridement measurements  Length (cm): 0.8  Width (cm): 0.5  Depth (cm): 0.1  Percent debrided: 90%  Surface Area (cm^2): 0.4  Area debrided (cm^2): 0.4  Volume (cm^3): 0  Devitalized tissue debrided: biofilm and exudate  Instrument(s) utilized: curette  Bleeding: small  Hemostasis obtained with: pressure  Procedural pain (0-10): 1  Post-procedural pain: 0   Response to treatment: procedure was tolerated well        Plan: It was a pleasure to see Precious Wagoner for wound care follow up today  Selective debridement performed today as above  Wound is improving   Continue plan of care as noted below with change to silver alginate to both wounds. No signs or symptoms of infection today. Patient understands that if any signs of infection start (such as increased redness, drainage, pain, fever, chills, diaphoresis), they should call our office or proceed to the ER or Urgent Care. Patient should continue a high protein diet to facilitate wound healing  Patient is advised to not submerge wound or leave wound open to air. Follow up in 1 weeks  Given the multi-factorial nature of wound care, additional time was taken to review patient's treatment plan with other specialties and most recent pertinent lab work and imaging. All plans of care discussed with patient at bedside who verbalized understanding with treatment plan. Wound 09/19/23 Traumatic Leg Left;Lateral (Active)   Wound Image Images linked 10/24/23 1007   Wound Description Yellow;Slough; Beefy red;Hypergranulation 10/24/23 1006   Dorys-wound Assessment Intact; Maceration;Edema; Hyperpigmented 10/24/23 1006   Wound Length (cm) 4.2 cm 10/24/23 1006   Wound Width (cm) 2.8 cm 10/24/23 1006   Wound Depth (cm) 0.1 cm 10/24/23 1006   Wound Surface Area (cm^2) 11.76 cm^2 10/24/23 1006   Wound Volume (cm^3) 1.176 cm^3 10/24/23 1006   Calculated Wound Volume (cm^3) 1.18 cm^3 10/24/23 1006   Change in Wound Size % 56.3 10/24/23 1006   Drainage Amount Large 10/24/23 1006   Drainage Description Serosanguineous; Yellow 10/24/23 1006   Non-staged Wound Description Full thickness 10/24/23 1006   Dressing Status Intact; New drainage; Old drainage (upon arrival) 10/24/23 1006       Wound 09/26/23 Traumatic Leg Right; Anterior (Active)   Wound Image Images linked 10/24/23 1011   Wound Description Granulation tissue;Pink;Slough; Yellow 10/24/23 1011   Dorys-wound Assessment Dry;Scaly;Edema; Hyperpigmented 10/24/23 1011   Wound Length (cm) 0.8 cm 10/24/23 1011   Wound Width (cm) 0.5 cm 10/24/23 1011   Wound Depth (cm) 0.1 cm 10/24/23 1011   Wound Surface Area (cm^2) 0.4 cm^2 10/24/23 1011   Wound Volume (cm^3) 0.04 cm^3 10/24/23 1011   Calculated Wound Volume (cm^3) 0.04 cm^3 10/24/23 1011   Change in Wound Size % 20 10/24/23 1011   Drainage Amount Moderate 10/24/23 1011   Drainage Description Green;Yellow 10/24/23 1011   Non-staged Wound Description Full thickness 10/24/23 1011   Dressing Status Intact; Old drainage;New drainage (upon arrival) 10/24/23 1011       Wound 09/19/23 Traumatic Leg Left;Lateral (Active)   Date First Assessed/Time First Assessed: 09/19/23 0938   Primary Wound Type: Traumatic  Location: Leg  Wound Location Orientation: Left;Lateral       Wound 09/26/23 Traumatic Leg Right; Anterior (Active)   Date First Assessed/Time First Assessed: 09/26/23 0950   Primary Wound Type: Traumatic  Location: Leg  Wound Location Orientation: Right; Anterior       [REMOVED] Wound 08/17/20 Knee Left (Removed)   Resolved Date: 09/19/23  Date First Assessed/Time First Assessed: 08/17/20 0936   Location: Knee  Wound Location Orientation: Left  Wound Description (Comments): darryl stocking  Incision's 1st Dressing: ADHESIVE SKIN CLOSR DERMABOND MIRACLEO, DRESSING MEP. .. [REMOVED] Wound 03/28/22 Knee Right (Removed)   Resolved Date: 09/19/23  Date First Assessed/Time First Assessed: 03/28/22 0854   Location: Knee  Wound Location Orientation: Right  Wound Description (Comments): DARRYL stockings applied to bilateral lower legs  Incision's 1st Dressing: ADHESIVE SKIN HI. ..        [REMOVED] Wound 04/11/22 Surgical Knee Right (Removed)   Resolved Date: 09/19/23  Date First Assessed/Time First Assessed: 04/11/22 2000   Primary Wound Type: Surgical  Location: Knee  Wound Location Orientation: Right  Wound Outcome: Unknown (No longer present)       [REMOVED] Wound 09/05/23 Catheter entry/exit site Wrist Right (Removed)   Resolved Date: 09/19/23  Date First Assessed/Time First Assessed: 09/05/23 0921   Traumatic Wound Type: Catheter entry/exit site  Location: Wrist  Wound Location Orientation: Right  Wound Outcome: Unknown (No longer present)       [REMOVED] Wound 09/05/23 Skin Tear Pretibial Left; Outer (Removed)   Resolved Date: 09/19/23  Date First Assessed/Time First Assessed: 09/05/23 0730   Present on Original Admission: Yes  Primary Wound Type: Skin Tear  Location: Pretibial  Wound Location Orientation: Left; Outer  Wound Outcome: (c) Other (Comment)       Subjective:      .    10/24/23: Patient happy with wound healing. Wounds are looking better today. No symptoms of infection. 10/10/23: Wound care being performed by patient's son and VNA who are coming twice per week. Patient notes that she did have increased leg swelling since her last visit. Overall happy with wound healing     9/26/23: Dressing changes from VNA and her son. Denies any local or systemic symptoms of infection today. Happy with wound healing. Has been elevating legs. 9/19/23: Ella Beasley is a pleasant 82-year-old female with a past medical history of type 2 diabetes mellitus most recent A1c 5.8% 1 month ago, diabetic polyneuropathy, atrial  fibrillation on Xarelto, obesity, history of heart failure with most recent echocardiogram showing normal function here today for initial wound care consult. Per chart review patient sustained the wound of her left lower extremity on August 21, 2023 when she closed a car door on her leg. She went the next day to her PCP and was advised to apply warm compresses. She has been seen in the ED multiple times for wound checks. Initially she has been put on clindamycin and given Tdap vaccination.  Wound culture taken which grew 1+ staph coag negative, and later wound culture grew 1+ Staph epidermidis during inpatient stay. She was admitted for 3 days from 9/5 to 9/8/2023 at [facility] and underwent bedside I&D with placement of wound VAC by podiatry. Arterial duplex showed no significant arterial disease however PVR tracings were dampened. Great toe pressure  within healing range. Blood cultures were negative. She is instructed to follow-up outpatient with podiatry and VNA was set up for patient. Recently patient was seen again in ER on September 15, 2023 when there was concern again for infection of the wound. She was prescribed clindamycin and referred to wound management. Referral was placed by physician assistant Tico Austin. Today patient continues to take clindamycin as directed. Denies any local or systemic signs of infection including fever chills diaphoresis. 9/5/23: LEADs  RIGHT LOWER LIMB:  No evidence of significant lower extremity arterial occlusive disease. Ankle/Brachial index: 2.11 which is unreliable due to non compressible vessels. PVR/ PPG tracings are dampened. Metatarsal pressure of 109 mmHg  Great toe pressure of 81 mmHg, within the healing range. LEFT LOWER LIMB:  No evidence of significant lower extremity arterial occlusive disease. Ankle/Brachial index: 2.11 which is unreliable due to non compressible vessels. PVR/ PPG tracings are dampened. Metatarsal pressure of 152 mmHg  Great toe pressure of 92 mmHg, within the healing range. The following portions of the patient's history were reviewed and updated as appropriate: allergies, current medications, past family history, past medical history, past social history, past surgical history, and problem list.    Review of Systems   Constitutional:  Negative for chills, diaphoresis, fatigue and fever. Skin:  Positive for wound. All other systems reviewed and are negative.         Objective:       Wound 09/19/23 Traumatic Leg Left;Lateral (Active)   Wound Image Images linked 10/24/23 1007   Wound Description Yellow;Slough; Beefy red;Hypergranulation 10/24/23 1006   Dorys-wound Assessment Intact; Maceration;Edema; Hyperpigmented 10/24/23 1006   Wound Length (cm) 4.2 cm 10/24/23 1006   Wound Width (cm) 2.8 cm 10/24/23 1006   Wound Depth (cm) 0.1 cm 10/24/23 1006   Wound Surface Area (cm^2) 11.76 cm^2 10/24/23 1006   Wound Volume (cm^3) 1.176 cm^3 10/24/23 1006   Calculated Wound Volume (cm^3) 1.18 cm^3 10/24/23 1006   Change in Wound Size % 56.3 10/24/23 1006   Drainage Amount Large 10/24/23 1006   Drainage Description Serosanguineous; Yellow 10/24/23 1006   Non-staged Wound Description Full thickness 10/24/23 1006   Dressing Status Intact; New drainage; Old drainage (upon arrival) 10/24/23 1006       Wound 09/26/23 Traumatic Leg Right; Anterior (Active)   Wound Image Images linked 10/24/23 1011   Wound Description Granulation tissue;Pink;Slough; Yellow 10/24/23 1011   Dorys-wound Assessment Dry;Scaly;Edema; Hyperpigmented 10/24/23 1011   Wound Length (cm) 0.8 cm 10/24/23 1011   Wound Width (cm) 0.5 cm 10/24/23 1011   Wound Depth (cm) 0.1 cm 10/24/23 1011   Wound Surface Area (cm^2) 0.4 cm^2 10/24/23 1011   Wound Volume (cm^3) 0.04 cm^3 10/24/23 1011   Calculated Wound Volume (cm^3) 0.04 cm^3 10/24/23 1011   Change in Wound Size % 20 10/24/23 1011   Drainage Amount Moderate 10/24/23 1011   Drainage Description Green;Yellow 10/24/23 1011   Non-staged Wound Description Full thickness 10/24/23 1011   Dressing Status Intact; Old drainage;New drainage (upon arrival) 10/24/23 1011       /85   Pulse 86   Temp 97.6 °F (36.4 °C) (Temporal)     Physical Exam  Vitals reviewed. Constitutional:       Appearance: Normal appearance. HENT:      Head: Normocephalic and atraumatic. Mouth/Throat:      Mouth: Mucous membranes are moist.   Eyes:      Extraocular Movements: Extraocular movements intact.    Pulmonary:      Effort: Pulmonary effort is normal.   Skin:     Comments: Anterior right lower extremity wound significantly smaller today. Serosanguineous exudate. Lateral distal left lower extremity wound smaller than last exam.  Healthy wound bed tissue. Healthy borders. No fibrin in the wound bed today. Heavy serosanguineous exudate. Neurological:      Mental Status: She is alert. Psychiatric:         Mood and Affect: Mood normal.         Behavior: Behavior normal.                 Wound Instructions:  Orders Placed This Encounter   Procedures    Wound cleansing and dressings     Right and left lower leg wounds:     Wash your hands with soap and water. Remove old dressing, discard into plastic bag and place in trash. Cleanse the wound with mild soap and water prior to applying a clean dressing. Do not use tissue or cotton balls. Do not scrub the wound. Pat dry using gauze. Shower yes prior to dressing change. Apply moisturizer to skin surrounding wound  Apply Maxorb AG or equivalent silver alginate to the right and left leg lower wound cut to size of wounds - not on good skin please. Cover with gauze  Secure with oscar and tape - please no bandaids  Change dressing 3x week     Standing Status:   Future     Standing Expiration Date:   10/24/2024    Wound compression and edema control     Elastic Tubular Stocking size F     Tubular elastic bandage: Apply from base of toes to behind the knee. Apply in AM, may remove for sleep. Avoid prolonged standing in one place. Elevate leg(s) above the level of the heart when sitting or as much as possible. Standing Status:   Future     Standing Expiration Date:   10/24/2024    Debridement     This order was created via procedure documentation    Debridement     This order was created via procedure documentation        Diagnosis ICD-10-CM Associated Orders   1.  Traumatic open wound of right lower leg, initial encounter  S81.801A lidocaine (XYLOCAINE) 4 % topical solution 5 mL     Wound cleansing and dressings     Wound compression and edema control Debridement      2. Traumatic open wound of left lower leg, initial encounter  S81.802A lidocaine (XYLOCAINE) 4 % topical solution 5 mL     Wound cleansing and dressings     Wound compression and edema control     Debridement      3. Lymphedema of both lower extremities  I89.0 lidocaine (XYLOCAINE) 4 % topical solution 5 mL     Wound cleansing and dressings     Wound compression and edema control      4. Chronic venous hypertension (idiopathic) with ulcer of right lower extremity (CODE) ([facility])  I87.311 lidocaine (XYLOCAINE) 4 % topical solution 5 mL     Wound cleansing and dressings     Wound compression and edema control      5. Lipedema  R60.9 lidocaine (XYLOCAINE) 4 % topical solution 5 mL     Wound cleansing and dressings     Wound compression and edema control      6. Chronic anticoagulation  Z79.01 lidocaine (XYLOCAINE) 4 % topical solution 5 mL     Wound cleansing and dressings     Wound compression and edema control      7. Chronic diastolic CHF (congestive heart failure) ([facility])  I50.32 lidocaine (XYLOCAINE) 4 % topical solution 5 mL     Wound cleansing and dressings     Wound compression and edema control          --  Ida Lopez MD    "This note has been constructed using a voice recognition system. Therefore there may be syntax, spelling, and/or grammatical errors. Occasional wrong word or "sound alike" substitutions may have occurred due to the inherent limitations of voice recognition software. Read the chart carefully and recognize, using context, where substitutions have occurred.  Please call if you have any questions."

## 2023-10-30 DIAGNOSIS — I50.32 CHRONIC DIASTOLIC HEART FAILURE (HCC): ICD-10-CM

## 2023-10-30 NOTE — TELEPHONE ENCOUNTER
Requested medication(s) are due for refill today: yes  Patient has already received a courtesy refill: No  Other reason request has been forwarded to provider:

## 2023-10-31 RX ORDER — TORSEMIDE 20 MG/1
20 TABLET ORAL DAILY
Qty: 90 TABLET | Refills: 3 | Status: SHIPPED | OUTPATIENT
Start: 2023-10-31

## 2023-11-01 ENCOUNTER — OFFICE VISIT (OUTPATIENT)
Dept: WOUND CARE | Facility: HOSPITAL | Age: 83
End: 2023-11-01
Payer: MEDICARE

## 2023-11-01 VITALS
RESPIRATION RATE: 18 BRPM | DIASTOLIC BLOOD PRESSURE: 72 MMHG | HEART RATE: 87 BPM | TEMPERATURE: 97.3 F | SYSTOLIC BLOOD PRESSURE: 113 MMHG

## 2023-11-01 DIAGNOSIS — Z79.01 CHRONIC ANTICOAGULATION: ICD-10-CM

## 2023-11-01 DIAGNOSIS — I87.311 CHRONIC VENOUS HYPERTENSION (IDIOPATHIC) WITH ULCER OF RIGHT LOWER EXTREMITY (CODE) (HCC): ICD-10-CM

## 2023-11-01 DIAGNOSIS — S81.801A TRAUMATIC OPEN WOUND OF RIGHT LOWER LEG, INITIAL ENCOUNTER: Primary | ICD-10-CM

## 2023-11-01 DIAGNOSIS — S81.802A TRAUMATIC OPEN WOUND OF LEFT LOWER LEG, INITIAL ENCOUNTER: ICD-10-CM

## 2023-11-01 DIAGNOSIS — I89.0 LYMPHEDEMA OF BOTH LOWER EXTREMITIES: ICD-10-CM

## 2023-11-01 DIAGNOSIS — I70.209 PERIPHERAL ARTERIOSCLEROSIS (HCC): ICD-10-CM

## 2023-11-01 DIAGNOSIS — R60.9 LIPEDEMA: ICD-10-CM

## 2023-11-01 PROCEDURE — 97597 DBRDMT OPN WND 1ST 20 CM/<: CPT | Performed by: STUDENT IN AN ORGANIZED HEALTH CARE EDUCATION/TRAINING PROGRAM

## 2023-11-01 RX ORDER — LIDOCAINE HYDROCHLORIDE 40 MG/ML
5 SOLUTION TOPICAL ONCE
Status: COMPLETED | OUTPATIENT
Start: 2023-11-01 | End: 2023-11-01

## 2023-11-01 RX ADMIN — LIDOCAINE HYDROCHLORIDE 5 ML: 40 SOLUTION TOPICAL at 11:50

## 2023-11-01 NOTE — PATIENT INSTRUCTIONS
Orders Placed This Encounter   Procedures    Wound cleansing and dressings     Wound cleansing and dressings      Right and left lower leg wounds:     Wash your hands with soap and water. Remove old dressing, discard into plastic bag and place in trash. Cleanse the wound with mild soap and water prior to applying a clean dressing. Do not use tissue or cotton balls. Do not scrub the wound. Pat dry using gauze. Shower yes prior to dressing change. Apply moisturizer to skin surrounding wound  Apply Maxorb AG or equivalent silver alginate to the left leg wound cut to size of wounds - not on good skin please. Tuck puracol plus AG into right leg wound. Cover with gauze  Secure with oscar and tape - please no bandaids  Change dressing 3x week    This was done today    Wound compression and edema control      Elastic Tubular Stocking size F   Tubular elastic bandage: Apply from base of toes to behind the knee. Apply in AM, may remove for sleep. Avoid prolonged standing in one place. Elevate leg(s) above the level of the heart when sitting or as much as possible.      Standing Status:   Future     Standing Expiration Date:   11/1/2024

## 2023-11-01 NOTE — PROGRESS NOTES
Patient ID: Lissette Worley is a 82 y.o. female Date of Birth 1940     Chief Complaint  Chief Complaint   Patient presents with    Follow Up Wound Care Visit     Bilateral leg wounds       Allergies  Latex, Cephalexin, Duloxetine hcl, Erythromycin, Levofloxacin, Penicillins, Savella [milnacipran], and Sulfa antibiotics    Assessment:     Diagnoses and all orders for this visit:    Traumatic open wound of right lower leg, initial encounter  -     lidocaine (XYLOCAINE) 4 % topical solution 5 mL  -     Wound cleansing and dressings; Future  -     Debridement    Traumatic open wound of left lower leg, initial encounter  -     lidocaine (XYLOCAINE) 4 % topical solution 5 mL  -     Wound cleansing and dressings; Future  -     Debridement    Lymphedema of both lower extremities  -     lidocaine (XYLOCAINE) 4 % topical solution 5 mL  -     Wound cleansing and dressings; Future    Chronic venous hypertension (idiopathic) with ulcer of right lower extremity (CODE) (HCC)  -     lidocaine (XYLOCAINE) 4 % topical solution 5 mL  -     Wound cleansing and dressings; Future    Lipedema  -     lidocaine (XYLOCAINE) 4 % topical solution 5 mL  -     Wound cleansing and dressings; Future    Chronic anticoagulation  -     lidocaine (XYLOCAINE) 4 % topical solution 5 mL  -     Wound cleansing and dressings; Future    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HCC)  -     lidocaine (XYLOCAINE) 4 % topical solution 5 mL  -     Wound cleansing and dressings; Future              Debridement   Wound 09/19/23 Traumatic Leg Left;Lateral    Universal Protocol:  Consent: Verbal consent obtained. Risks and benefits: risks, benefits and alternatives were discussed  Consent given by: patient  Time out: Immediately prior to procedure a "time out" was called to verify the correct patient, procedure, equipment, support staff and site/side marked as required.   Patient identity confirmed: verbally with patient    Performed by: physician  Debridement type: selective  Pain control: lidocaine 4%  Pre-debridement measurements  Length (cm): 3.2  Width (cm): 2.6  Depth (cm): 0.1  Surface Area (cm^2): 8.3  Volume (cm^3): 0.8    Post-debridement measurements  Length (cm): 3.2  Width (cm): 2.7  Depth (cm): 0.1  Percent debrided: 90%  Surface Area (cm^2): 8.6  Area debrided (cm^2): 7.7  Volume (cm^3): 0.9  Devitalized tissue debrided: biofilm and exudate  Instrument(s) utilized: curette  Bleeding: small  Hemostasis obtained with: pressure  Procedural pain (0-10): 1  Post-procedural pain: 0   Response to treatment: procedure was tolerated well    Debridement   Wound 09/26/23 Traumatic Leg Right; Anterior    Universal Protocol:  Consent: Verbal consent obtained. Risks and benefits: risks, benefits and alternatives were discussed  Consent given by: patient  Time out: Immediately prior to procedure a "time out" was called to verify the correct patient, procedure, equipment, support staff and site/side marked as required. Patient identity confirmed: verbally with patient    Performed by: physician  Debridement type: selective  Pain control: lidocaine 4%  Pre-debridement measurements  Length (cm): 0.5  Width (cm): 0.6  Depth (cm): 0.3  Surface Area (cm^2): 0.3  Volume (cm^3): 0.1    Post-debridement measurements  Length (cm): 0.5  Width (cm): 0.6  Depth (cm): 0.5  Percent debrided: 90%  Surface Area (cm^2): 0.3  Area debrided (cm^2): 0.3  Volume (cm^3): 0.2  Devitalized tissue debrided: biofilm, exudate and fibrin  Instrument(s) utilized: curette  Bleeding: small  Hemostasis obtained with: pressure  Procedural pain (0-10): 1  Post-procedural pain: 0   Response to treatment: procedure was tolerated well        Plan:    It was a pleasure to see Carolyn Salgado for wound care follow up today  Selective debridement performed today as above  Wound is improving   Continue plan of care as noted below with alcohol Ag to right lower extremity wound and silver alginate to left lower extremity lateral wound. No signs or symptoms of infection today. Patient understands that if any signs of infection start (such as increased redness, drainage, pain, fever, chills, diaphoresis), they should call our office or proceed to the ER or Urgent Care. Patient should continue a high protein diet to facilitate wound healing  Patient is advised to not submerge wound or leave wound open to air. Follow up in 1 weeks  Given the multi-factorial nature of wound care, additional time was taken to review patient's treatment plan with other specialties and most recent pertinent lab work and imaging. All plans of care discussed with patient at bedside who verbalized understanding with treatment plan. Wound 09/19/23 Traumatic Leg Left;Lateral (Active)   Wound Image Images linked 11/01/23 1141   Wound Description Slough; Beefy red;Hypergranulation 11/01/23 1141   Dorys-wound Assessment Intact;Edema; Hyperpigmented;Fragile 11/01/23 1141   Wound Length (cm) 3.2 cm 11/01/23 1141   Wound Width (cm) 2.6 cm 11/01/23 1141   Wound Depth (cm) 0.1 cm 11/01/23 1141   Wound Surface Area (cm^2) 8.32 cm^2 11/01/23 1141   Wound Volume (cm^3) 0.832 cm^3 11/01/23 1141   Calculated Wound Volume (cm^3) 0.83 cm^3 11/01/23 1141   Change in Wound Size % 69.26 11/01/23 1141   Drainage Amount Moderate 11/01/23 1141   Drainage Description Serosanguineous; Yellow 11/01/23 1141   Non-staged Wound Description Full thickness 11/01/23 1141       Wound 09/26/23 Traumatic Leg Right; Anterior (Active)   Wound Image Images linked 11/01/23 1139   Wound Description Granulation tissue;Pink;Slough; Yellow 11/01/23 1139   Dorys-wound Assessment Dry;Edema; Hyperpigmented 11/01/23 1139   Wound Length (cm) 0.5 cm 11/01/23 1139   Wound Width (cm) 0.6 cm 11/01/23 1139   Wound Depth (cm) 0.3 cm 11/01/23 1139   Wound Surface Area (cm^2) 0.3 cm^2 11/01/23 1139   Wound Volume (cm^3) 0.09 cm^3 11/01/23 1139   Calculated Wound Volume (cm^3) 0.09 cm^3 11/01/23 1139 Change in Wound Size % -80 11/01/23 1139   Drainage Amount Small 11/01/23 1139   Drainage Description Serosanguineous 11/01/23 1139   Non-staged Wound Description Full thickness 11/01/23 1139       Wound 09/19/23 Traumatic Leg Left;Lateral (Active)   Date First Assessed/Time First Assessed: 09/19/23 0938   Primary Wound Type: Traumatic  Location: Leg  Wound Location Orientation: Left;Lateral       Wound 09/26/23 Traumatic Leg Right; Anterior (Active)   Date First Assessed/Time First Assessed: 09/26/23 0950   Primary Wound Type: Traumatic  Location: Leg  Wound Location Orientation: Right; Anterior       [REMOVED] Wound 08/17/20 Knee Left (Removed)   Resolved Date: 09/19/23  Date First Assessed/Time First Assessed: 08/17/20 0936   Location: Knee  Wound Location Orientation: Left  Wound Description (Comments): darryl stocking  Incision's 1st Dressing: ADHESIVE SKIN CLOSR DERMABOND PRINEO, DRESSING MEP. .. [REMOVED] Wound 03/28/22 Knee Right (Removed)   Resolved Date: 09/19/23  Date First Assessed/Time First Assessed: 03/28/22 0854   Location: Knee  Wound Location Orientation: Right  Wound Description (Comments): DARRYL stockings applied to bilateral lower legs  Incision's 1st Dressing: ADHESIVE SKIN HI. .. [REMOVED] Wound 04/11/22 Surgical Knee Right (Removed)   Resolved Date: 09/19/23  Date First Assessed/Time First Assessed: 04/11/22 2000   Primary Wound Type: Surgical  Location: Knee  Wound Location Orientation: Right  Wound Outcome: Unknown (No longer present)       [REMOVED] Wound 09/05/23 Catheter entry/exit site Wrist Right (Removed)   Resolved Date: 09/19/23  Date First Assessed/Time First Assessed: 09/05/23 0921   Traumatic Wound Type: Catheter entry/exit site  Location: Wrist  Wound Location Orientation: Right  Wound Outcome: Unknown (No longer present)       [REMOVED] Wound 09/05/23 Skin Tear Pretibial Left; Outer (Removed)   Resolved Date: 09/19/23  Date First Assessed/Time First Assessed: 09/05/23 0730 Present on Original Admission: Yes  Primary Wound Type: Skin Tear  Location: Pretibial  Wound Location Orientation: Left; Outer  Wound Outcome: (c) Other (Comment)       Subjective:      .    11/1/23, 10/24/23: Patient happy with wound healing. Wounds are looking better today. No symptoms of infection. 10/10/23: Wound care being performed by patient's son and VNA who are coming twice per week. Patient notes that she did have increased leg swelling since her last visit. Overall happy with wound healing     9/26/23: Dressing changes from VNA and her son. Denies any local or systemic symptoms of infection today. Happy with wound healing. Has been elevating legs. 9/19/23: Vanesa Mayfield is a pleasant 73-year-old female with a past medical history of type 2 diabetes mellitus most recent A1c 5.8% 1 month ago, diabetic polyneuropathy, atrial  fibrillation on Xarelto, obesity, history of heart failure with most recent echocardiogram showing normal function here today for initial wound care consult. Per chart review patient sustained the wound of her left lower extremity on August 21, 2023 when she closed a car door on her leg. She went the next day to her PCP and was advised to apply warm compresses. She has been seen in the ED multiple times for wound checks. Initially she has been put on clindamycin and given Tdap vaccination. Wound culture taken which grew 1+ staph coag negative, and later wound culture grew 1+ Staph epidermidis during inpatient stay. She was admitted for 3 days from 9/5 to 9/8/2023 at [facility] and underwent bedside I&D with placement of wound VAC by podiatry. Arterial duplex showed no significant arterial disease however PVR tracings were dampened. Great toe pressure  within healing range. Blood cultures were negative. She is instructed to follow-up outpatient with podiatry and VNA was set up for patient.   Recently patient was seen again in ER on September 15, 2023 when there was concern again for infection of the wound. She was prescribed clindamycin and referred to wound management. Referral was placed by physician assistant Rory Garcia. Today patient continues to take clindamycin as directed. Denies any local or systemic signs of infection including fever chills diaphoresis. 9/5/23: LEADs  RIGHT LOWER LIMB:  No evidence of significant lower extremity arterial occlusive disease. Ankle/Brachial index: 2.11 which is unreliable due to non compressible vessels. PVR/ PPG tracings are dampened. Metatarsal pressure of 109 mmHg  Great toe pressure of 81 mmHg, within the healing range. LEFT LOWER LIMB:  No evidence of significant lower extremity arterial occlusive disease. Ankle/Brachial index: 2.11 which is unreliable due to non compressible vessels. PVR/ PPG tracings are dampened. Metatarsal pressure of 152 mmHg  Great toe pressure of 92 mmHg, within the healing range. The following portions of the patient's history were reviewed and updated as appropriate: allergies, current medications, past family history, past medical history, past social history, past surgical history, and problem list.    Review of Systems   Constitutional:  Negative for chills, diaphoresis, fatigue and fever. Skin:  Positive for wound. All other systems reviewed and are negative. Objective:       Wound 09/19/23 Traumatic Leg Left;Lateral (Active)   Wound Image Images linked 11/01/23 1141   Wound Description Slough; Beefy red;Hypergranulation 11/01/23 1141   Dorys-wound Assessment Intact;Edema; Hyperpigmented;Fragile 11/01/23 1141   Wound Length (cm) 3.2 cm 11/01/23 1141   Wound Width (cm) 2.6 cm 11/01/23 1141   Wound Depth (cm) 0.1 cm 11/01/23 1141   Wound Surface Area (cm^2) 8.32 cm^2 11/01/23 1141   Wound Volume (cm^3) 0.832 cm^3 11/01/23 1141   Calculated Wound Volume (cm^3) 0.83 cm^3 11/01/23 1141   Change in Wound Size % 69.26 11/01/23 1141 Drainage Amount Moderate 11/01/23 1141   Drainage Description Serosanguineous; Yellow 11/01/23 1141   Non-staged Wound Description Full thickness 11/01/23 1141       Wound 09/26/23 Traumatic Leg Right; Anterior (Active)   Wound Image Images linked 11/01/23 1139   Wound Description Granulation tissue;Pink;Slough; Yellow 11/01/23 1139   Dorys-wound Assessment Dry;Edema; Hyperpigmented 11/01/23 1139   Wound Length (cm) 0.5 cm 11/01/23 1139   Wound Width (cm) 0.6 cm 11/01/23 1139   Wound Depth (cm) 0.3 cm 11/01/23 1139   Wound Surface Area (cm^2) 0.3 cm^2 11/01/23 1139   Wound Volume (cm^3) 0.09 cm^3 11/01/23 1139   Calculated Wound Volume (cm^3) 0.09 cm^3 11/01/23 1139   Change in Wound Size % -80 11/01/23 1139   Drainage Amount Small 11/01/23 1139   Drainage Description Serosanguineous 11/01/23 1139   Non-staged Wound Description Full thickness 11/01/23 1139       /72   Pulse 87   Temp (!) 97.3 °F (36.3 °C) (Temporal)   Resp 18     Physical Exam  Vitals reviewed. Constitutional:       Appearance: Normal appearance. HENT:      Head: Normocephalic and atraumatic. Mouth/Throat:      Mouth: Mucous membranes are moist.   Eyes:      Extraocular Movements: Extraocular movements intact. Pulmonary:      Effort: Pulmonary effort is normal.   Musculoskeletal:      Right lower leg: Edema present. Left lower leg: Edema present. Skin:     Comments: bilateral lower extremity wound smaller today. right lower extremity wound with some fibrin deposition seen in the wound bed. Left lower extremity wound significantly smaller with bordered epithelialization. No signs of infection to either wound. Neurological:      Mental Status: She is alert.    Psychiatric:         Mood and Affect: Mood normal.         Behavior: Behavior normal.                 Wound Instructions:  Orders Placed This Encounter   Procedures    Wound cleansing and dressings     Wound cleansing and dressings      Right and left lower leg wounds:     Wash your hands with soap and water. Remove old dressing, discard into plastic bag and place in trash. Cleanse the wound with mild soap and water prior to applying a clean dressing. Do not use tissue or cotton balls. Do not scrub the wound. Pat dry using gauze. Shower yes prior to dressing change. Apply moisturizer to skin surrounding wound  Apply Maxorb AG or equivalent silver alginate to the left leg wound cut to size of wounds - not on good skin please. Tuck puracol plus AG into right leg wound. Cover with gauze  Secure with oscar and tape - please no bandaids  Change dressing 3x week    This was done today    Wound compression and edema control      Elastic Tubular Stocking size F   Tubular elastic bandage: Apply from base of toes to behind the knee. Apply in AM, may remove for sleep. Avoid prolonged standing in one place. Elevate leg(s) above the level of the heart when sitting or as much as possible. Standing Status:   Future     Standing Expiration Date:   11/1/2024    Debridement     This order was created via procedure documentation    Debridement     This order was created via procedure documentation        Diagnosis ICD-10-CM Associated Orders   1. Traumatic open wound of right lower leg, initial encounter  S81.801A lidocaine (XYLOCAINE) 4 % topical solution 5 mL     Wound cleansing and dressings     Debridement      2. Traumatic open wound of left lower leg, initial encounter  S81.802A lidocaine (XYLOCAINE) 4 % topical solution 5 mL     Wound cleansing and dressings     Debridement      3. Lymphedema of both lower extremities  I89.0 lidocaine (XYLOCAINE) 4 % topical solution 5 mL     Wound cleansing and dressings      4. Chronic venous hypertension (idiopathic) with ulcer of right lower extremity (CODE) ([facility])  I87.311 lidocaine (XYLOCAINE) 4 % topical solution 5 mL     Wound cleansing and dressings      5.  Lipedema  R60.9 lidocaine (XYLOCAINE) 4 % topical solution 5 mL     Wound cleansing and dressings      6. Chronic anticoagulation  Z79.01 lidocaine (XYLOCAINE) 4 % topical solution 5 mL     Wound cleansing and dressings      7. 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HCC)  I70.209 lidocaine (XYLOCAINE) 4 % topical solution 5 mL     Wound cleansing and dressings          --  Reese Moy MD    "This note has been constructed using a voice recognition system. Therefore there may be syntax, spelling, and/or grammatical errors. Occasional wrong word or "sound alike" substitutions may have occurred due to the inherent limitations of voice recognition software. Read the chart carefully and recognize, using context, where substitutions have occurred.  Please call if you have any questions."

## 2023-11-01 NOTE — LETTER
[address] [place] [address] WOUND CARE  [facility] [address] [facility] [address]  Phone#  [phone]  Fax#  [phone]    Patient:  Carolyn Salgado  YOB: 1940  Phone:  [phone]  Date of Visit:  11/1/2023    Orders Placed This Encounter   Procedures   • Wound cleansing and dressings     Wound cleansing and dressings      Right and left lower leg wounds:     Wash your hands with soap and water. Remove old dressing, discard into plastic bag and place in trash. Cleanse the wound with mild soap and water prior to applying a clean dressing. Do not use tissue or cotton balls. Do not scrub the wound. Pat dry using gauze. Shower yes prior to dressing change. Apply moisturizer to skin surrounding wound  Apply Maxorb AG or equivalent silver alginate to the left leg wound cut to size of wounds - not on good skin please. Tuck puracol plus AG into right leg wound. Cover with gauze  Secure with oscar and tape - please no bandaids  Change dressing 3x week    This was done today    Wound compression and edema control      Elastic Tubular Stocking size F   Tubular elastic bandage: Apply from base of toes to behind the knee. Apply in AM, may remove for sleep. Avoid prolonged standing in one place. Elevate leg(s) above the level of the heart when sitting or as much as possible.      Standing Status:   Future     Standing Expiration Date:   11/1/2024   • Debridement     This order was created via procedure documentation   • Debridement     This order was created via procedure documentation         Electronically signed by Ricky Mcgraw MD

## 2023-11-08 ENCOUNTER — OFFICE VISIT (OUTPATIENT)
Dept: WOUND CARE | Facility: HOSPITAL | Age: 83
End: 2023-11-08
Payer: MEDICARE

## 2023-11-08 VITALS
DIASTOLIC BLOOD PRESSURE: 85 MMHG | TEMPERATURE: 97.8 F | SYSTOLIC BLOOD PRESSURE: 128 MMHG | HEART RATE: 92 BPM | RESPIRATION RATE: 16 BRPM

## 2023-11-08 DIAGNOSIS — R60.9 LIPEDEMA: ICD-10-CM

## 2023-11-08 DIAGNOSIS — S81.801A TRAUMATIC OPEN WOUND OF RIGHT LOWER LEG, INITIAL ENCOUNTER: Primary | ICD-10-CM

## 2023-11-08 DIAGNOSIS — I89.0 LYMPHEDEMA OF BOTH LOWER EXTREMITIES: ICD-10-CM

## 2023-11-08 DIAGNOSIS — I70.209 PERIPHERAL ARTERIOSCLEROSIS (HCC): ICD-10-CM

## 2023-11-08 DIAGNOSIS — S81.802A TRAUMATIC OPEN WOUND OF LEFT LOWER LEG, INITIAL ENCOUNTER: ICD-10-CM

## 2023-11-08 DIAGNOSIS — I87.311 CHRONIC VENOUS HYPERTENSION (IDIOPATHIC) WITH ULCER OF RIGHT LOWER EXTREMITY (CODE) (HCC): ICD-10-CM

## 2023-11-08 DIAGNOSIS — Z79.01 CHRONIC ANTICOAGULATION: ICD-10-CM

## 2023-11-08 PROCEDURE — 97597 DBRDMT OPN WND 1ST 20 CM/<: CPT | Performed by: STUDENT IN AN ORGANIZED HEALTH CARE EDUCATION/TRAINING PROGRAM

## 2023-11-08 RX ORDER — LIDOCAINE HYDROCHLORIDE 40 MG/ML
5 SOLUTION TOPICAL ONCE
Status: COMPLETED | OUTPATIENT
Start: 2023-11-08 | End: 2023-11-08

## 2023-11-08 RX ADMIN — LIDOCAINE HYDROCHLORIDE 5 ML: 40 SOLUTION TOPICAL at 08:57

## 2023-11-08 NOTE — PROGRESS NOTES
Patient ID: Humble Hernandez is a 82 y.o. female Date of Birth 1940     Chief Complaint  Chief Complaint   Patient presents with    Follow Up Wound Care Visit     BLE wounds       Allergies  Latex, Cephalexin, Duloxetine hcl, Erythromycin, Levofloxacin, Penicillins, Savella [milnacipran], and Sulfa antibiotics    Assessment:     Diagnoses and all orders for this visit:    Traumatic open wound of right lower leg, initial encounter  -     lidocaine (XYLOCAINE) 4 % topical solution 5 mL  -     Wound cleansing and dressings; Future  -     Wound compression and edema control; Future  -     Debridement    Traumatic open wound of left lower leg, initial encounter  -     lidocaine (XYLOCAINE) 4 % topical solution 5 mL  -     Wound cleansing and dressings; Future  -     Wound compression and edema control; Future  -     Debridement    Lymphedema of both lower extremities    Chronic venous hypertension (idiopathic) with ulcer of right lower extremity (CODE) (HCC)    Lipedema    Chronic anticoagulation    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[address])              Debridement   Wound 09/19/23 Traumatic Leg Left;Lateral    Universal Protocol:  Consent: Verbal consent obtained. Risks and benefits: risks, benefits and alternatives were discussed  Consent given by: patient  Time out: Immediately prior to procedure a "time out" was called to verify the correct patient, procedure, equipment, support staff and site/side marked as required.   Patient identity confirmed: verbally with patient    Performed by: physician  Debridement type: selective  Pain control: lidocaine 4%  Post-debridement measurements  Length (cm): 3.1  Width (cm): 2  Depth (cm): 0.1  Percent debrided: 100%  Surface Area (cm^2): 6.2  Area debrided (cm^2): 6.2  Volume (cm^3): 0.6  Devitalized tissue debrided: biofilm and exudate  Instrument(s) utilized: curette  Bleeding: small  Hemostasis obtained with: pressure  Procedural pain (0-10): 1  Post-procedural pain: 0 Response to treatment: procedure was tolerated well    Debridement   Wound 09/26/23 Traumatic Leg Right; Anterior    Universal Protocol:  Consent: Verbal consent obtained. Risks and benefits: risks, benefits and alternatives were discussed  Consent given by: patient  Time out: Immediately prior to procedure a "time out" was called to verify the correct patient, procedure, equipment, support staff and site/side marked as required. Patient identity confirmed: verbally with patient    Performed by: physician  Debridement type: selective  Pain control: lidocaine 4%  Post-debridement measurements  Length (cm): 1  Width (cm): 0.4  Depth (cm): 0.1  Percent debrided: 90%  Surface Area (cm^2): 0.4  Area debrided (cm^2): 0.4  Volume (cm^3): 0  Devitalized tissue debrided: biofilm and exudate  Instrument(s) utilized: curette  Bleeding: small  Hemostasis obtained with: pressure  Procedural pain (0-10): 1  Post-procedural pain: 0   Response to treatment: procedure was tolerated well        Plan: It was a pleasure to see Nadeem Leslie for wound care follow up today  Selective debridement performed today as above  Wound is improving   Continue plan of care as noted below with change to hydrofera ready   No signs or symptoms of infection today. Patient understands that if any signs of infection start (such as increased redness, drainage, pain, fever, chills, diaphoresis), they should call our office or proceed to the ER or Urgent Care. Patient should continue a high protein diet to facilitate wound healing  Patient is advised to not submerge wound or leave wound open to air. Follow up in 1 weeks  Given the multi-factorial nature of wound care, additional time was taken to review patient's treatment plan with other specialties and most recent pertinent lab work and imaging. All plans of care discussed with patient at bedside who verbalized understanding with treatment plan.          Wound 09/19/23 Traumatic Leg Left;Lateral (Active)   Wound Image Images linked 11/08/23 0843   Wound Description Beefy red;Hypergranulation;Epithelialization;Yellow 11/08/23 0843   Dorys-wound Assessment Dry; Intact;Pink;Brown 11/08/23 0843   Wound Length (cm) 3 cm 11/08/23 0843   Wound Width (cm) 2 cm 11/08/23 0843   Wound Depth (cm) 0.1 cm 11/08/23 0843   Wound Surface Area (cm^2) 6 cm^2 11/08/23 0843   Wound Volume (cm^3) 0.6 cm^3 11/08/23 0843   Calculated Wound Volume (cm^3) 0.6 cm^3 11/08/23 0843   Change in Wound Size % 77.78 11/08/23 0843   Drainage Amount Small 11/08/23 0843   Drainage Description Serosanguineous 11/08/23 0843   Non-staged Wound Description Full thickness 11/08/23 0843   Dressing Status Intact 11/08/23 0843       Wound 09/26/23 Traumatic Leg Right; Anterior (Active)   Wound Image Images linked 11/08/23 0841   Wound Description Eschar;Other (Comment) (scab) 11/08/23 0840   Dorys-wound Assessment Dry; Intact 11/08/23 0840   Wound Length (cm) 1 cm 11/08/23 0840   Wound Width (cm) 0.3 cm 11/08/23 0840   Wound Depth (cm) 0.1 cm 11/08/23 0840   Wound Surface Area (cm^2) 0.3 cm^2 11/08/23 0840   Wound Volume (cm^3) 0.03 cm^3 11/08/23 0840   Calculated Wound Volume (cm^3) 0.03 cm^3 11/08/23 0840   Change in Wound Size % 40 11/08/23 0840   Drainage Amount None 11/08/23 0840   Non-staged Wound Description Full thickness 11/08/23 0840   Dressing Status Intact 11/08/23 0840       Wound 09/19/23 Traumatic Leg Left;Lateral (Active)   Date First Assessed/Time First Assessed: 09/19/23 0938   Primary Wound Type: Traumatic  Location: Leg  Wound Location Orientation: Left;Lateral       Wound 09/26/23 Traumatic Leg Right; Anterior (Active)   Date First Assessed/Time First Assessed: 09/26/23 0950   Primary Wound Type: Traumatic  Location: Leg  Wound Location Orientation: Right; Anterior       [REMOVED] Wound 08/17/20 Knee Left (Removed)   Resolved Date: 09/19/23  Date First Assessed/Time First Assessed: 08/17/20 0936   Location: Knee  Wound Location Orientation: Left  Wound Description (Comments): darryl stocking  Incision's 1st Dressing: ADHESIVE SKIN CLOSR DERMABOND PRINEO, DRESSING MEP. .. [REMOVED] Wound 03/28/22 Knee Right (Removed)   Resolved Date: 09/19/23  Date First Assessed/Time First Assessed: 03/28/22 0854   Location: Knee  Wound Location Orientation: Right  Wound Description (Comments): DARRYL stockings applied to bilateral lower legs  Incision's 1st Dressing: ADHESIVE SKIN HI. .. [REMOVED] Wound 04/11/22 Surgical Knee Right (Removed)   Resolved Date: 09/19/23  Date First Assessed/Time First Assessed: 04/11/22 2000   Primary Wound Type: Surgical  Location: Knee  Wound Location Orientation: Right  Wound Outcome: Unknown (No longer present)       [REMOVED] Wound 09/05/23 Catheter entry/exit site Wrist Right (Removed)   Resolved Date: 09/19/23  Date First Assessed/Time First Assessed: 09/05/23 0921   Traumatic Wound Type: Catheter entry/exit site  Location: Wrist  Wound Location Orientation: Right  Wound Outcome: Unknown (No longer present)       [REMOVED] Wound 09/05/23 Skin Tear Pretibial Left; Outer (Removed)   Resolved Date: 09/19/23  Date First Assessed/Time First Assessed: 09/05/23 0730   Present on Original Admission: Yes  Primary Wound Type: Skin Tear  Location: Pretibial  Wound Location Orientation: Left; Outer  Wound Outcome: (c) Other (Comment)       Subjective:      .    11/8/23: Patient is happy with wound healing. Notes that there is less drainage from the wound. No symptoms of infection today. 11/1/23, 10/24/23: Patient happy with wound healing. Wounds are looking better today. No symptoms of infection. 10/10/23: Wound care being performed by patient's son and VNA who are coming twice per week. Patient notes that she did have increased leg swelling since her last visit. Overall happy with wound healing     9/26/23: Dressing changes from VNA and her son. Denies any local or systemic symptoms of infection today. Happy with wound healing. Has been elevating legs. 9/19/23: Astrid Hudson is a pleasant 82-year-old female with a past medical history of type 2 diabetes mellitus most recent A1c 5.8% 1 month ago, diabetic polyneuropathy, atrial  fibrillation on Xarelto, obesity, history of heart failure with most recent echocardiogram showing normal function here today for initial wound care consult. Per chart review patient sustained the wound of her left lower extremity on August 21, 2023 when she closed a car door on her leg. She went the next day to her PCP and was advised to apply warm compresses. She has been seen in the ED multiple times for wound checks. Initially she has been put on clindamycin and given Tdap vaccination. Wound culture taken which grew 1+ staph coag negative, and later wound culture grew 1+ Staph epidermidis during inpatient stay. She was admitted for 3 days from 9/5 to 9/8/2023 at [facility] and underwent bedside I&D with placement of wound VAC by podiatry. Arterial duplex showed no significant arterial disease however PVR tracings were dampened. Great toe pressure  within healing range. Blood cultures were negative. She is instructed to follow-up outpatient with podiatry and VNA was set up for patient. Recently patient was seen again in ER on September 15, 2023 when there was concern again for infection of the wound. She was prescribed clindamycin and referred to wound management. Referral was placed by physician assistant Carly Em. Today patient continues to take clindamycin as directed. Denies any local or systemic signs of infection including fever chills diaphoresis. 9/5/23: LEADs  RIGHT LOWER LIMB:  No evidence of significant lower extremity arterial occlusive disease. Ankle/Brachial index: 2.11 which is unreliable due to non compressible vessels. PVR/ PPG tracings are dampened.   Metatarsal pressure of 109 mmHg  Great toe pressure of 81 mmHg, within the healing range. LEFT LOWER LIMB:  No evidence of significant lower extremity arterial occlusive disease. Ankle/Brachial index: 2.11 which is unreliable due to non compressible vessels. PVR/ PPG tracings are dampened. Metatarsal pressure of 152 mmHg  Great toe pressure of 92 mmHg, within the healing range. The following portions of the patient's history were reviewed and updated as appropriate: allergies, current medications, past family history, past medical history, past social history, past surgical history, and problem list.    Review of Systems   Constitutional:  Negative for chills, diaphoresis, fatigue and fever. Skin:  Positive for wound. All other systems reviewed and are negative. Objective:       Wound 09/19/23 Traumatic Leg Left;Lateral (Active)   Wound Image Images linked 11/08/23 0843   Wound Description Beefy red;Hypergranulation;Epithelialization;Yellow 11/08/23 0843   Dorys-wound Assessment Dry; Intact;Pink;Brown 11/08/23 0843   Wound Length (cm) 3 cm 11/08/23 0843   Wound Width (cm) 2 cm 11/08/23 0843   Wound Depth (cm) 0.1 cm 11/08/23 0843   Wound Surface Area (cm^2) 6 cm^2 11/08/23 0843   Wound Volume (cm^3) 0.6 cm^3 11/08/23 0843   Calculated Wound Volume (cm^3) 0.6 cm^3 11/08/23 0843   Change in Wound Size % 77.78 11/08/23 0843   Drainage Amount Small 11/08/23 0843   Drainage Description Serosanguineous 11/08/23 0843   Non-staged Wound Description Full thickness 11/08/23 0843   Dressing Status Intact 11/08/23 0843       Wound 09/26/23 Traumatic Leg Right; Anterior (Active)   Wound Image Images linked 11/08/23 0841   Wound Description Eschar;Other (Comment) (scab) 11/08/23 0840   Dorys-wound Assessment Dry; Intact 11/08/23 0840   Wound Length (cm) 1 cm 11/08/23 0840   Wound Width (cm) 0.3 cm 11/08/23 0840   Wound Depth (cm) 0.1 cm 11/08/23 0840   Wound Surface Area (cm^2) 0.3 cm^2 11/08/23 0840   Wound Volume (cm^3) 0.03 cm^3 11/08/23 0840   Calculated Wound Volume (cm^3) 0.03 cm^3 11/08/23 0840   Change in Wound Size % 40 11/08/23 0840   Drainage Amount None 11/08/23 0840   Non-staged Wound Description Full thickness 11/08/23 0840   Dressing Status Intact 11/08/23 0840       /85   Pulse 92   Temp 97.8 °F (36.6 °C)   Resp 16     Physical Exam  Vitals reviewed. Constitutional:       Appearance: Normal appearance. HENT:      Head: Normocephalic and atraumatic. Mouth/Throat:      Mouth: Mucous membranes are moist.   Eyes:      Extraocular Movements: Extraocular movements intact. Pulmonary:      Effort: Pulmonary effort is normal.   Musculoskeletal:      Right lower leg: Edema present. Left lower leg: Edema present. Skin:     Comments: Lateral lower extremity wound smaller than last exam.  Healthy wound bed tissue. No signs of infection. Neurological:      Mental Status: She is alert. Psychiatric:         Mood and Affect: Mood normal.         Behavior: Behavior normal.                 Wound Instructions:  Orders Placed This Encounter   Procedures    Wound cleansing and dressings     Right and left lower leg wounds:     Wash your hands with soap and water. Remove old dressing, discard into plastic bag and place in trash. Cleanse the wound with mild soap and water prior to applying a clean dressing. Do not use tissue or cotton balls. Do not scrub the wound. Pat dry using gauze. Shower yes prior to dressing change. Apply moisturizer to skin surrounding wound  Apply Hydrofera ready to the left and right leg wound cut to size of wounds - not on good skin please. Cover with gauze  Secure with oscar and tape - please no bandaids  Change dressing 3x week     This was done today     Standing Status:   Future     Standing Expiration Date:   11/8/2024    Wound compression and edema control     Elastic Tubular Stocking size F   Tubular elastic bandage: Apply from base of toes to behind the knee. Apply in AM, may remove for sleep.   Avoid prolonged standing in one place. Elevate leg(s) above the level of the heart when sitting or as much as possible. Standing Status:   Future     Standing Expiration Date:   11/8/2024    Debridement     This order was created via procedure documentation    Debridement     This order was created via procedure documentation        Diagnosis ICD-10-CM Associated Orders   1. Traumatic open wound of right lower leg, initial encounter  S81.801A lidocaine (XYLOCAINE) 4 % topical solution 5 mL     Wound cleansing and dressings     Wound compression and edema control     Debridement      2. Traumatic open wound of left lower leg, initial encounter  S81.802A lidocaine (XYLOCAINE) 4 % topical solution 5 mL     Wound cleansing and dressings     Wound compression and edema control     Debridement      3. Lymphedema of both lower extremities  I89.0       4. Chronic venous hypertension (idiopathic) with ulcer of right lower extremity (CODE) ([facility])  I87.311       5. Lipedema  R60.9       6. Chronic anticoagulation  Z79.01       7. 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[address])  I70.209           --  Su Watson MD    "This note has been constructed using a voice recognition system. Therefore there may be syntax, spelling, and/or grammatical errors. Occasional wrong word or "sound alike" substitutions may have occurred due to the inherent limitations of voice recognition software. Read the chart carefully and recognize, using context, where substitutions have occurred.  Please call if you have any questions."

## 2023-11-08 NOTE — PATIENT INSTRUCTIONS
Orders Placed This Encounter   Procedures    Wound cleansing and dressings     Right and left lower leg wounds:     Wash your hands with soap and water. Remove old dressing, discard into plastic bag and place in trash. Cleanse the wound with mild soap and water prior to applying a clean dressing. Do not use tissue or cotton balls. Do not scrub the wound. Pat dry using gauze. Shower yes prior to dressing change. Apply moisturizer to skin surrounding wound  Apply Hydrofera ready to the left and right leg wound cut to size of wounds - not on good skin please. Cover with gauze  Secure with oscar and tape - please no bandaids  Change dressing 3x week     This was done today     Standing Status:   Future     Standing Expiration Date:   11/8/2024    Wound compression and edema control     Elastic Tubular Stocking size F   Tubular elastic bandage: Apply from base of toes to behind the knee. Apply in AM, may remove for sleep. Avoid prolonged standing in one place. Elevate leg(s) above the level of the heart when sitting or as much as possible.      Standing Status:   Future     Standing Expiration Date:   11/8/2024

## 2023-11-15 ENCOUNTER — OFFICE VISIT (OUTPATIENT)
Dept: WOUND CARE | Facility: HOSPITAL | Age: 83
End: 2023-11-15
Payer: MEDICARE

## 2023-11-15 VITALS
HEART RATE: 89 BPM | SYSTOLIC BLOOD PRESSURE: 150 MMHG | RESPIRATION RATE: 20 BRPM | TEMPERATURE: 97.7 F | DIASTOLIC BLOOD PRESSURE: 87 MMHG

## 2023-11-15 DIAGNOSIS — S81.801A TRAUMATIC OPEN WOUND OF RIGHT LOWER LEG, INITIAL ENCOUNTER: Primary | ICD-10-CM

## 2023-11-15 DIAGNOSIS — I89.0 LYMPHEDEMA OF BOTH LOWER EXTREMITIES: ICD-10-CM

## 2023-11-15 DIAGNOSIS — R60.9 LIPEDEMA: ICD-10-CM

## 2023-11-15 DIAGNOSIS — I70.209 PERIPHERAL ARTERIOSCLEROSIS (HCC): ICD-10-CM

## 2023-11-15 DIAGNOSIS — S81.802A TRAUMATIC OPEN WOUND OF LEFT LOWER LEG, INITIAL ENCOUNTER: ICD-10-CM

## 2023-11-15 PROCEDURE — 97597 DBRDMT OPN WND 1ST 20 CM/<: CPT | Performed by: STUDENT IN AN ORGANIZED HEALTH CARE EDUCATION/TRAINING PROGRAM

## 2023-11-15 PROCEDURE — 99214 OFFICE O/P EST MOD 30 MIN: CPT | Performed by: STUDENT IN AN ORGANIZED HEALTH CARE EDUCATION/TRAINING PROGRAM

## 2023-11-15 RX ORDER — LIDOCAINE HYDROCHLORIDE 40 MG/ML
5 SOLUTION TOPICAL ONCE
Status: COMPLETED | OUTPATIENT
Start: 2023-11-15 | End: 2023-11-15

## 2023-11-15 RX ADMIN — LIDOCAINE HYDROCHLORIDE 5 ML: 40 SOLUTION TOPICAL at 09:42

## 2023-11-15 NOTE — PROGRESS NOTES
Patient ID: Trino Florence is a 82 y.o. female Date of Birth 1940     Chief Complaint  Chief Complaint   Patient presents with    Follow Up Wound Care Visit     BLE wounds       Allergies  Latex, Cephalexin, Duloxetine hcl, Erythromycin, Levofloxacin, Penicillins, Savella [milnacipran], and Sulfa antibiotics    Assessment:     Diagnoses and all orders for this visit:    Traumatic open wound of right lower leg, initial encounter  -     lidocaine (XYLOCAINE) 4 % topical solution 5 mL  -     Wound cleansing and dressings; Future  -     Wound compression and edema control; Future  -     Wound cleansing and dressings; Future    Traumatic open wound of left lower leg, initial encounter  -     lidocaine (XYLOCAINE) 4 % topical solution 5 mL  -     Wound cleansing and dressings; Future  -     Wound compression and edema control; Future  -     Wound cleansing and dressings; Future    Lymphedema of both lower extremities    Lipedema    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[address])              Procedures    PLAN    It was a pleasure to see Trino Florence for wound care follow up today  Selective debridement performed today as above  Wounds are improving. Size of left lower extremity wound does appear objectively larger however this is due to skin tear from patient's VNA nurse keeping Hydrofera Blue directly to the skin. Continue plan of care as noted below with Hydrofera Blue ready blue due to left lower extremity wound. Should be help with rolled gauze and tape that adheres only to the gauze and gauze. Puracol to right lower extremity wound. Spandigrip for gentle compression. No signs or symptoms of infection today. Patient understands that if any signs of infection start (such as increased redness, drainage, pain, fever, chills, diaphoresis), they should call our office or proceed to the ER or Urgent Care.   Patient should continue a high protein diet to facilitate wound healing  Patient is advised to not submerge wound or leave wound open to air. Follow up in 2 weeks  Given the multi-factorial nature of wound care, additional time was taken to review patient's treatment plan with other specialties and most recent pertinent lab work and imaging. All plans of care discussed with patient at bedside who verbalized understanding with treatment plan. Wound 09/19/23 Traumatic Leg Left;Lateral (Active)   Wound Image Images linked 11/15/23 0937   Wound Description Beefy red;Hypergranulation;Epithelialization;Yellow 11/15/23 0937   Dorys-wound Assessment Dry; Intact;Pink;Maceration; Other (Comment) (skin tear adjacent laterally) 11/15/23 0937   Wound Length (cm) 3.4 cm 11/15/23 0937   Wound Width (cm) 4.7 cm (new skin tear next to wound) 11/15/23 0937   Wound Depth (cm) 0.1 cm 11/15/23 0937   Wound Surface Area (cm^2) 15.98 cm^2 11/15/23 0937   Wound Volume (cm^3) 1.598 cm^3 11/15/23 0937   Calculated Wound Volume (cm^3) 1.6 cm^3 11/15/23 0937   Change in Wound Size % 40.74 11/15/23 0937   Drainage Amount Moderate 11/15/23 0937   Drainage Description Serosanguineous 11/15/23 0937   Non-staged Wound Description Full thickness 11/15/23 0937   Dressing Status Intact 11/15/23 0937       Wound 09/26/23 Traumatic Leg Right; Anterior (Active)   Wound Image Images linked 11/15/23 0935   Wound Description Pink; Marie 11/15/23 0935   Dorys-wound Assessment Dry; Intact 11/15/23 0935   Wound Length (cm) 0.3 cm 11/15/23 0935   Wound Width (cm) 0.2 cm 11/15/23 0935   Wound Depth (cm) 0.2 cm 11/15/23 0935   Wound Surface Area (cm^2) 0.06 cm^2 11/15/23 0935   Wound Volume (cm^3) 0.012 cm^3 11/15/23 0935   Calculated Wound Volume (cm^3) 0.01 cm^3 11/15/23 0935   Change in Wound Size % 80 11/15/23 0935   Drainage Amount Small 11/15/23 0935   Drainage Description Serosanguineous 11/15/23 0935   Non-staged Wound Description Full thickness 11/15/23 0935   Dressing Status Intact 11/15/23 0935       Wound 09/19/23 Traumatic Leg Left;Lateral (Active)   Date First Assessed/Time First Assessed: 09/19/23 0938   Primary Wound Type: Traumatic  Location: Leg  Wound Location Orientation: Left;Lateral       Wound 09/26/23 Traumatic Leg Right; Anterior (Active)   Date First Assessed/Time First Assessed: 09/26/23 0950   Primary Wound Type: Traumatic  Location: Leg  Wound Location Orientation: Right; Anterior       [REMOVED] Wound 08/17/20 Knee Left (Removed)   Resolved Date: 09/19/23  Date First Assessed/Time First Assessed: 08/17/20 0936   Location: Knee  Wound Location Orientation: Left  Wound Description (Comments): darryl stocking  Incision's 1st Dressing: ADHESIVE SKIN CLOSR DERMABOND PRINEO, DRESSING MEP. .. [REMOVED] Wound 03/28/22 Knee Right (Removed)   Resolved Date: 09/19/23  Date First Assessed/Time First Assessed: 03/28/22 0854   Location: Knee  Wound Location Orientation: Right  Wound Description (Comments): DARRYL stockings applied to bilateral lower legs  Incision's 1st Dressing: ADHESIVE SKIN HI. .. [REMOVED] Wound 04/11/22 Surgical Knee Right (Removed)   Resolved Date: 09/19/23  Date First Assessed/Time First Assessed: 04/11/22 2000   Primary Wound Type: Surgical  Location: Knee  Wound Location Orientation: Right  Wound Outcome: Unknown (No longer present)       [REMOVED] Wound 09/05/23 Catheter entry/exit site Wrist Right (Removed)   Resolved Date: 09/19/23  Date First Assessed/Time First Assessed: 09/05/23 0921   Traumatic Wound Type: Catheter entry/exit site  Location: Wrist  Wound Location Orientation: Right  Wound Outcome: Unknown (No longer present)       [REMOVED] Wound 09/05/23 Skin Tear Pretibial Left; Outer (Removed)   Resolved Date: 09/19/23  Date First Assessed/Time First Assessed: 09/05/23 0730   Present on Original Admission: Yes  Primary Wound Type: Skin Tear  Location: Pretibial  Wound Location Orientation: Left; Outer  Wound Outcome: (c) Other (Comment)       Subjective:      .    11/15/23: Patient is happy with wound healing.   Note skin tear on lateral left lower extremity. No symptoms of infection. 11/8/23: Patient is happy with wound healing. Notes that there is less drainage from the wound. No symptoms of infection today. 11/1/23, 10/24/23: Patient happy with wound healing. Wounds are looking better today. No symptoms of infection. 10/10/23: Wound care being performed by patient's son and VNA who are coming twice per week. Patient notes that she did have increased leg swelling since her last visit. Overall happy with wound healing     9/26/23: Dressing changes from VNA and her son. Denies any local or systemic symptoms of infection today. Happy with wound healing. Has been elevating legs. 9/19/23: Kimberly Vital is a pleasant 82-year-old female with a past medical history of type 2 diabetes mellitus most recent A1c 5.8% 1 month ago, diabetic polyneuropathy, atrial  fibrillation on Xarelto, obesity, history of heart failure with most recent echocardiogram showing normal function here today for initial wound care consult. Per chart review patient sustained the wound of her left lower extremity on August 21, 2023 when she closed a car door on her leg. She went the next day to her PCP and was advised to apply warm compresses. She has been seen in the ED multiple times for wound checks. Initially she has been put on clindamycin and given Tdap vaccination. Wound culture taken which grew 1+ staph coag negative, and later wound culture grew 1+ Staph epidermidis during inpatient stay. She was admitted for 3 days from 9/5 to 9/8/2023 at [facility] and underwent bedside I&D with placement of wound VAC by podiatry. Arterial duplex showed no significant arterial disease however PVR tracings were dampened. Great toe pressure  within healing range. Blood cultures were negative. She is instructed to follow-up outpatient with podiatry and VNA was set up for patient.   Recently patient was seen again in ER on September 15, 2023 when there was concern again for infection of the wound. She was prescribed clindamycin and referred to wound management. Referral was placed by physician assistant Francicsa Rodriguez. Today patient continues to take clindamycin as directed. Denies any local or systemic signs of infection including fever chills diaphoresis. 9/5/23: LEADs  RIGHT LOWER LIMB:  No evidence of significant lower extremity arterial occlusive disease. Ankle/Brachial index: 2.11 which is unreliable due to non compressible vessels. PVR/ PPG tracings are dampened. Metatarsal pressure of 109 mmHg  Great toe pressure of 81 mmHg, within the healing range. LEFT LOWER LIMB:  No evidence of significant lower extremity arterial occlusive disease. Ankle/Brachial index: 2.11 which is unreliable due to non compressible vessels. PVR/ PPG tracings are dampened. Metatarsal pressure of 152 mmHg  Great toe pressure of 92 mmHg, within the healing range. The following portions of the patient's history were reviewed and updated as appropriate: allergies, current medications, past family history, past medical history, past social history, past surgical history, and problem list.    Review of Systems   Constitutional:  Negative for chills, diaphoresis, fatigue and fever. Skin:  Positive for wound. All other systems reviewed and are negative. Objective:       Wound 09/19/23 Traumatic Leg Left;Lateral (Active)   Wound Image Images linked 11/15/23 0937   Wound Description Beefy red;Hypergranulation;Epithelialization;Yellow 11/15/23 0937   Dorys-wound Assessment Dry; Intact;Pink;Maceration; Other (Comment) (skin tear adjacent laterally) 11/15/23 0937   Wound Length (cm) 3.4 cm 11/15/23 0937   Wound Width (cm) 4.7 cm (new skin tear next to wound) 11/15/23 0937   Wound Depth (cm) 0.1 cm 11/15/23 0937   Wound Surface Area (cm^2) 15.98 cm^2 11/15/23 0937   Wound Volume (cm^3) 1.598 cm^3 11/15/23 0937   Calculated Wound Volume (cm^3) 1.6 cm^3 11/15/23 0937   Change in Wound Size % 40.74 11/15/23 0937   Drainage Amount Moderate 11/15/23 0937   Drainage Description Serosanguineous 11/15/23 0937   Non-staged Wound Description Full thickness 11/15/23 0937   Dressing Status Intact 11/15/23 0937       Wound 09/26/23 Traumatic Leg Right; Anterior (Active)   Wound Image Images linked 11/15/23 0935   Wound Description Pink; Marie 11/15/23 0935   Dorys-wound Assessment Dry; Intact 11/15/23 0935   Wound Length (cm) 0.3 cm 11/15/23 0935   Wound Width (cm) 0.2 cm 11/15/23 0935   Wound Depth (cm) 0.2 cm 11/15/23 0935   Wound Surface Area (cm^2) 0.06 cm^2 11/15/23 0935   Wound Volume (cm^3) 0.012 cm^3 11/15/23 0935   Calculated Wound Volume (cm^3) 0.01 cm^3 11/15/23 0935   Change in Wound Size % 80 11/15/23 0935   Drainage Amount Small 11/15/23 0935   Drainage Description Serosanguineous 11/15/23 0935   Non-staged Wound Description Full thickness 11/15/23 0935   Dressing Status Intact 11/15/23 0935       /87   Pulse 89   Temp 97.7 °F (36.5 °C)   Resp 20     Physical Exam  Vitals reviewed. Constitutional:       Appearance: Normal appearance. HENT:      Head: Normocephalic and atraumatic. Mouth/Throat:      Mouth: Mucous membranes are moist.   Eyes:      Extraocular Movements: Extraocular movements intact. Pulmonary:      Effort: Pulmonary effort is normal.   Musculoskeletal:      Right lower leg: Edema present. Left lower leg: Edema present. Skin:     Comments: Lower extremity wound slightly smaller than last exam.  Serosanguineous exudate. No surrounding erythema. Left lower extremity wound with healthy granulation tissue and bordering epithelialization. Just lateral to this there is a small discrete area that appears to be a skin tear. Minimal exudate from this skin tear wound. Neurological:      Mental Status: She is alert.    Psychiatric:         Mood and Affect: Mood normal.         Behavior: Behavior normal. Wound Instructions:  Orders Placed This Encounter   Procedures    Wound cleansing and dressings     Left lower leg wounds:     Wash your hands with soap and water. Remove old dressing, discard into plastic bag and place in trash. Cleanse the wound with mild soap and water prior to applying a clean dressing. Do not use tissue or cotton balls. Do not scrub the wound. Pat dry using gauze. Shower yes prior to dressing change. Apply moisturizer to skin surrounding wound  Apply Hydrofera ready to the left leg wound cut to size of wounds - put gauze over dressing and do not put tape directly on the skin. Cover with gauze  Secure with oscar and tape - please no bandaids  Change dressing 3x week     This was done today    ·     Standing Status:   Future     Standing Expiration Date:   11/15/2024    Wound compression and edema control     Elastic Tubular Stocking size F   Tubular elastic bandage: Apply from base of toes to behind the knee. Apply in AM, may remove for sleep. Avoid prolonged standing in one place. Elevate leg(s) above the level of the heart when sitting or as much as possible. Standing Status:   Future     Standing Expiration Date:   11/15/2024    Wound cleansing and dressings     RIGHT leg wound:clean with soap and water. Shower: yes - the day of dressing changes. Purachol plus to wound bed - cut to size of wound. Cover with bordered gauze. Change three times a week     Standing Status:   Future     Standing Expiration Date:   11/15/2024        Diagnosis ICD-10-CM Associated Orders   1. Traumatic open wound of right lower leg, initial encounter  S81.801A lidocaine (XYLOCAINE) 4 % topical solution 5 mL     Wound cleansing and dressings     Wound compression and edema control     Wound cleansing and dressings      2.  Traumatic open wound of left lower leg, initial encounter  S81.802A lidocaine (XYLOCAINE) 4 % topical solution 5 mL     Wound cleansing and dressings     Wound compression and edema control     Wound cleansing and dressings      3. Lymphedema of both lower extremities  I89.0       4. Lipedema  R60.9       5. 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[address])  I70.209           --  Jonell Hodgkins, MD    "This note has been constructed using a voice recognition system. Therefore there may be syntax, spelling, and/or grammatical errors. Occasional wrong word or "sound alike" substitutions may have occurred due to the inherent limitations of voice recognition software. Read the chart carefully and recognize, using context, where substitutions have occurred.  Please call if you have any questions." lower extremities  I89.0       4. Lipedema  R60.9       5. 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[address])  I70.209           --  Melissa Delacruz MD    "This note has been constructed using a voice recognition system. Therefore there may be syntax, spelling, and/or grammatical errors. Occasional wrong word or "sound alike" substitutions may have occurred due to the inherent limitations of voice recognition software. Read the chart carefully and recognize, using context, where substitutions have occurred.  Please call if you have any questions."

## 2023-11-15 NOTE — PATIENT INSTRUCTIONS
Orders Placed This Encounter   Procedures    Wound cleansing and dressings     Left lower leg wounds:     Wash your hands with soap and water. Remove old dressing, discard into plastic bag and place in trash. Cleanse the wound with mild soap and water prior to applying a clean dressing. Do not use tissue or cotton balls. Do not scrub the wound. Pat dry using gauze. Shower yes prior to dressing change. Apply moisturizer to skin surrounding wound  Apply Hydrofera ready to the left leg wound cut to size of wounds - put gauze over dressing and do not put tape directly on the skin. Cover with gauze  Secure with oscar and tape - please no bandaids  Change dressing 3x week     This was done today    ·     Standing Status:   Future     Standing Expiration Date:   11/15/2024    Wound compression and edema control     Elastic Tubular Stocking size F   Tubular elastic bandage: Apply from base of toes to behind the knee. Apply in AM, may remove for sleep. Avoid prolonged standing in one place. Elevate leg(s) above the level of the heart when sitting or as much as possible. Standing Status:   Future     Standing Expiration Date:   11/15/2024    Wound cleansing and dressings     RIGHT leg wound:clean with soap and water. Shower: yes - the day of dressing changes. Purachol plus to wound bed - cut to size of wound. Cover with bordered gauze.   Change three times a week     Standing Status:   Future     Standing Expiration Date:   11/15/2024

## 2023-11-22 ENCOUNTER — OFFICE VISIT (OUTPATIENT)
Dept: WOUND CARE | Facility: HOSPITAL | Age: 83
End: 2023-11-22
Payer: MEDICARE

## 2023-11-22 VITALS
RESPIRATION RATE: 18 BRPM | DIASTOLIC BLOOD PRESSURE: 79 MMHG | SYSTOLIC BLOOD PRESSURE: 116 MMHG | TEMPERATURE: 97.8 F | HEART RATE: 77 BPM

## 2023-11-22 DIAGNOSIS — I89.0 LYMPHEDEMA OF BOTH LOWER EXTREMITIES: ICD-10-CM

## 2023-11-22 DIAGNOSIS — S81.802A TRAUMATIC OPEN WOUND OF LEFT LOWER LEG, INITIAL ENCOUNTER: ICD-10-CM

## 2023-11-22 DIAGNOSIS — Z79.01 CHRONIC ANTICOAGULATION: ICD-10-CM

## 2023-11-22 DIAGNOSIS — I70.209 PERIPHERAL ARTERIOSCLEROSIS (HCC): ICD-10-CM

## 2023-11-22 DIAGNOSIS — R60.9 LIPEDEMA: ICD-10-CM

## 2023-11-22 DIAGNOSIS — S81.801A TRAUMATIC OPEN WOUND OF RIGHT LOWER LEG, INITIAL ENCOUNTER: Primary | ICD-10-CM

## 2023-11-22 PROCEDURE — 97597 DBRDMT OPN WND 1ST 20 CM/<: CPT | Performed by: STUDENT IN AN ORGANIZED HEALTH CARE EDUCATION/TRAINING PROGRAM

## 2023-11-22 RX ORDER — LIDOCAINE HYDROCHLORIDE 40 MG/ML
5 SOLUTION TOPICAL ONCE
Status: COMPLETED | OUTPATIENT
Start: 2023-11-22 | End: 2023-11-22

## 2023-11-22 RX ADMIN — LIDOCAINE HYDROCHLORIDE 5 ML: 40 SOLUTION TOPICAL at 11:09

## 2023-11-22 NOTE — PATIENT INSTRUCTIONS
Orders Placed This Encounter   Procedures    Wound cleansing and dressings     Unna Boot wrap Instructions:    Keep compression wrap/wraps clean and dry. If wraps are too tight and you experience numbness/tingling, call the wound center. If after hours, remove wraps or proceed to nearest E.R. and call wound center in AM.  [facility] will be changed 1 x weekly  Avoid prolonged standing in one place. Elevate leg(s) above the level of the heart when sitting or as much as possible. Wound cleansing and dressings       Left and right lower leg wounds:     Washed with soap and water. Rinsed with normal saline  Shower: no unless cast cover is used. DO NOT GET DRESSING WET. Apply moisturizer to skin surrounding wound  Apply polymem max with silver to bilateral leg wounds. Cover with ABD. Apply unna boot, webril and coban with tubifast yellow. Dressing to be changed weekly in the wound center. Home care on hold this week.     This was done today     Standing Status:   Future     Standing Expiration Date:   11/22/2024

## 2023-11-22 NOTE — PROGRESS NOTES
Patient ID: Salud Ramirez is a 82 y.o. female Date of Birth 1940     Chief Complaint  Chief Complaint   Patient presents with    Follow Up Wound Care Visit     Bilateral leg wounds       Allergies  Latex, Cephalexin, Duloxetine hcl, Erythromycin, Levofloxacin, Penicillins, Savella [milnacipran], and Sulfa antibiotics    Assessment:     Diagnoses and all orders for this visit:    Traumatic open wound of right lower leg, initial encounter  -     lidocaine (XYLOCAINE) 4 % topical solution 5 mL  -     Wound cleansing and dressings; Future    Traumatic open wound of left lower leg, initial encounter  -     lidocaine (XYLOCAINE) 4 % topical solution 5 mL  -     Wound cleansing and dressings; Future    Lipedema  -     lidocaine (XYLOCAINE) 4 % topical solution 5 mL  -     Wound cleansing and dressings; Future    Lymphedema of both lower extremities  -     lidocaine (XYLOCAINE) 4 % topical solution 5 mL  -     Wound cleansing and dressings; Future    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HCC)  -     lidocaine (XYLOCAINE) 4 % topical solution 5 mL  -     Wound cleansing and dressings; Future    Chronic anticoagulation  -     lidocaine (XYLOCAINE) 4 % topical solution 5 mL  -     Wound cleansing and dressings; Future              Debridement   Wound 09/19/23 Traumatic Leg Left;Lateral    Universal Protocol:  Consent: Verbal consent obtained. Risks and benefits: risks, benefits and alternatives were discussed  Consent given by: patient  Time out: Immediately prior to procedure a "time out" was called to verify the correct patient, procedure, equipment, support staff and site/side marked as required.   Patient identity confirmed: verbally with patient    Performed by: physician  Debridement type: selective  Pain control: lidocaine 4%  Post-debridement measurements  Length (cm): 3.1  Width (cm): 4.7  Depth (cm): 0.2  Percent debrided: 90%  Surface Area (cm^2): 14.57  Area debrided (cm^2): 13.11  Volume (cm^3): 2.91  Devitalized tissue debrided: biofilm and exudate  Instrument(s) utilized: curette  Bleeding: small  Hemostasis obtained with: pressure  Procedural pain (0-10): 1  Post-procedural pain: 0   Response to treatment: procedure was tolerated well    Debridement   Wound 09/26/23 Traumatic Leg Right; Anterior    Universal Protocol:  Consent: Verbal consent obtained. Risks and benefits: risks, benefits and alternatives were discussed  Consent given by: patient  Time out: Immediately prior to procedure a "time out" was called to verify the correct patient, procedure, equipment, support staff and site/side marked as required. Patient identity confirmed: verbally with patient    Performed by: physician  Debridement type: selective  Pain control: lidocaine 4%  Post-debridement measurements  Length (cm): 0.2  Width (cm): 0.2  Depth (cm): 0.2  Percent debrided: 90%  Surface Area (cm^2): 0.04  Area debrided (cm^2): 0.04  Volume (cm^3): 0.01  Devitalized tissue debrided: biofilm and exudate  Instrument(s) utilized: curette  Bleeding: small  Hemostasis obtained with: pressure  Procedural pain (0-10): 1  Post-procedural pain: 0   Response to treatment: procedure was tolerated well        Plan: It was a pleasure to see Theresia Spatz for wound care follow up today  Selective debridement performed today as above  Wound is improving   Continue plan of care as noted below with polymem, abds, unna boots. Discussed  with patient that given her history of atrial fibrillation if she experiences any shortness of breath she should remove the Unna boots immediately and call our office. I do not anticipate that patient should have a problem with this but given her history we discussed the possibility. No signs or symptoms of infection today. Patient understands that if any signs of infection start (such as increased redness, drainage, pain, fever, chills, diaphoresis), they should call our office or proceed to the ER or Urgent Care.   Patient should continue a high protein diet to facilitate wound healing  Patient is advised to not submerge wound or leave wound open to air. Follow up in 1 weeks  Given the multi-factorial nature of wound care, additional time was taken to review patient's treatment plan with other specialties and most recent pertinent lab work and imaging. All plans of care discussed with patient at bedside who verbalized understanding with treatment plan. Wound 09/19/23 Traumatic Leg Left;Lateral (Active)   Wound Image Images linked 11/22/23 1103   Wound Description Beefy red;Epithelialization;Yellow;Slough 11/22/23 1101   Dorys-wound Assessment Dry; Intact;Pink;Maceration 11/22/23 1101   Wound Length (cm) 3.1 cm 11/22/23 1101   Wound Width (cm) 4.7 cm 11/22/23 1101   Wound Depth (cm) 0.2 cm 11/22/23 1101   Wound Surface Area (cm^2) 14.57 cm^2 11/22/23 1101   Wound Volume (cm^3) 2.914 cm^3 11/22/23 1101   Calculated Wound Volume (cm^3) 2.91 cm^3 11/22/23 1101   Change in Wound Size % -7.78 11/22/23 1101   Drainage Amount Moderate 11/22/23 1101   Drainage Description Serosanguineous 11/22/23 1101   Non-staged Wound Description Full thickness 11/22/23 1101       Wound 09/26/23 Traumatic Leg Right; Anterior (Active)   Wound Image Images linked 11/22/23 1100   Wound Description Pink; Marie 11/22/23 1100   Dorys-wound Assessment Dry; Intact 11/22/23 1100   Wound Length (cm) 0.2 cm 11/22/23 1100   Wound Width (cm) 0.2 cm 11/22/23 1100   Wound Depth (cm) 0.2 cm 11/22/23 1100   Wound Surface Area (cm^2) 0.04 cm^2 11/22/23 1100   Wound Volume (cm^3) 0.008 cm^3 11/22/23 1100   Calculated Wound Volume (cm^3) 0.01 cm^3 11/22/23 1100   Change in Wound Size % 80 11/22/23 1100   Drainage Amount Small 11/22/23 1100   Drainage Description Serosanguineous 11/22/23 1100   Non-staged Wound Description Full thickness 11/22/23 1100       Wound 09/19/23 Traumatic Leg Left;Lateral (Active)   Date First Assessed/Time First Assessed: 09/19/23 0906   Primary Wound Type: Traumatic  Location: Leg  Wound Location Orientation: Left;Lateral       Wound 09/26/23 Traumatic Leg Right; Anterior (Active)   Date First Assessed/Time First Assessed: 09/26/23 0950   Primary Wound Type: Traumatic  Location: Leg  Wound Location Orientation: Right; Anterior       [REMOVED] Wound 08/17/20 Knee Left (Removed)   Resolved Date: 09/19/23  Date First Assessed/Time First Assessed: 08/17/20 0936   Location: Knee  Wound Location Orientation: Left  Wound Description (Comments): darryl stocking  Incision's 1st Dressing: ADHESIVE SKIN CLOSR DERMABOND PRINEO, DRESSING MEP. .. [REMOVED] Wound 03/28/22 Knee Right (Removed)   Resolved Date: 09/19/23  Date First Assessed/Time First Assessed: 03/28/22 0854   Location: Knee  Wound Location Orientation: Right  Wound Description (Comments): DARRYL stockings applied to bilateral lower legs  Incision's 1st Dressing: ADHESIVE SKIN HI. .. [REMOVED] Wound 04/11/22 Surgical Knee Right (Removed)   Resolved Date: 09/19/23  Date First Assessed/Time First Assessed: 04/11/22 2000   Primary Wound Type: Surgical  Location: Knee  Wound Location Orientation: Right  Wound Outcome: Unknown (No longer present)       [REMOVED] Wound 09/05/23 Catheter entry/exit site Wrist Right (Removed)   Resolved Date: 09/19/23  Date First Assessed/Time First Assessed: 09/05/23 0921   Traumatic Wound Type: Catheter entry/exit site  Location: Wrist  Wound Location Orientation: Right  Wound Outcome: Unknown (No longer present)       [REMOVED] Wound 09/05/23 Skin Tear Pretibial Left; Outer (Removed)   Resolved Date: 09/19/23  Date First Assessed/Time First Assessed: 09/05/23 0730   Present on Original Admission: Yes  Primary Wound Type: Skin Tear  Location: Pretibial  Wound Location Orientation: Left; Outer  Wound Outcome: (c) Other (Comment)       Subjective:      .    11/22/23: Happy with wound healing. Applying dressing as directed. No symptoms of infection.     11/15/23: Patient is happy with wound healing. Note skin tear on lateral left lower extremity. No symptoms of infection. 11/8/23: Patient is happy with wound healing. Notes that there is less drainage from the wound. No symptoms of infection today. 11/1/23, 10/24/23: Patient happy with wound healing. Wounds are looking better today. No symptoms of infection. 10/10/23: Wound care being performed by patient's son and VNA who are coming twice per week. Patient notes that she did have increased leg swelling since her last visit. Overall happy with wound healing     9/26/23: Dressing changes from VNA and her son. Denies any local or systemic symptoms of infection today. Happy with wound healing. Has been elevating legs. 9/19/23: Nadine Tyler is a pleasant 57-year-old female with a past medical history of type 2 diabetes mellitus most recent A1c 5.8% 1 month ago, diabetic polyneuropathy, atrial  fibrillation on Xarelto, obesity, history of heart failure with most recent echocardiogram showing normal function here today for initial wound care consult. Per chart review patient sustained the wound of her left lower extremity on August 21, 2023 when she closed a car door on her leg. She went the next day to her PCP and was advised to apply warm compresses. She has been seen in the ED multiple times for wound checks. Initially she has been put on clindamycin and given Tdap vaccination. Wound culture taken which grew 1+ staph coag negative, and later wound culture grew 1+ Staph epidermidis during inpatient stay. She was admitted for 3 days from 9/5 to 9/8/2023 at [facility] and underwent bedside I&D with placement of wound VAC by podiatry. Arterial duplex showed no significant arterial disease however PVR tracings were dampened. Great toe pressure  within healing range. Blood cultures were negative. She is instructed to follow-up outpatient with podiatry and VNA was set up for patient.   Recently patient was seen again in ER on September 15, 2023 when there was concern again for infection of the wound. She was prescribed clindamycin and referred to wound management. Referral was placed by physician assistant Juanpablo Browning. Today patient continues to take clindamycin as directed. Denies any local or systemic signs of infection including fever chills diaphoresis. 9/5/23: LEADs  RIGHT LOWER LIMB:  No evidence of significant lower extremity arterial occlusive disease. Ankle/Brachial index: 2.11 which is unreliable due to non compressible vessels. PVR/ PPG tracings are dampened. Metatarsal pressure of 109 mmHg  Great toe pressure of 81 mmHg, within the healing range. LEFT LOWER LIMB:  No evidence of significant lower extremity arterial occlusive disease. Ankle/Brachial index: 2.11 which is unreliable due to non compressible vessels. PVR/ PPG tracings are dampened. Metatarsal pressure of 152 mmHg  Great toe pressure of 92 mmHg, within the healing range. The following portions of the patient's history were reviewed and updated as appropriate: allergies, current medications, past family history, past medical history, past social history, past surgical history, and problem list.    Review of Systems   Constitutional:  Negative for chills, diaphoresis, fatigue and fever. Skin:  Positive for wound. All other systems reviewed and are negative. Objective:       Wound 09/19/23 Traumatic Leg Left;Lateral (Active)   Wound Image Images linked 11/22/23 1103   Wound Description Beefy red;Epithelialization;Yellow;Slough 11/22/23 1101   Dorys-wound Assessment Dry; Intact;Pink;Maceration 11/22/23 1101   Wound Length (cm) 3.1 cm 11/22/23 1101   Wound Width (cm) 4.7 cm 11/22/23 1101   Wound Depth (cm) 0.2 cm 11/22/23 1101   Wound Surface Area (cm^2) 14.57 cm^2 11/22/23 1101   Wound Volume (cm^3) 2.914 cm^3 11/22/23 1101   Calculated Wound Volume (cm^3) 2.91 cm^3 11/22/23 1101   Change in Wound Size % -7.78 11/22/23 1101   Drainage Amount Moderate 11/22/23 1101   Drainage Description Serosanguineous 11/22/23 1101   Non-staged Wound Description Full thickness 11/22/23 1101       Wound 09/26/23 Traumatic Leg Right; Anterior (Active)   Wound Image Images linked 11/22/23 1100   Wound Description Pink; Marie 11/22/23 1100   Dorys-wound Assessment Dry; Intact 11/22/23 1100   Wound Length (cm) 0.2 cm 11/22/23 1100   Wound Width (cm) 0.2 cm 11/22/23 1100   Wound Depth (cm) 0.2 cm 11/22/23 1100   Wound Surface Area (cm^2) 0.04 cm^2 11/22/23 1100   Wound Volume (cm^3) 0.008 cm^3 11/22/23 1100   Calculated Wound Volume (cm^3) 0.01 cm^3 11/22/23 1100   Change in Wound Size % 80 11/22/23 1100   Drainage Amount Small 11/22/23 1100   Drainage Description Serosanguineous 11/22/23 1100   Non-staged Wound Description Full thickness 11/22/23 1100       /79   Pulse 77   Temp 97.8 °F (36.6 °C) (Temporal)   Resp 18     Physical Exam  Vitals reviewed. Constitutional:       Appearance: Normal appearance. HENT:      Head: Normocephalic and atraumatic. Mouth/Throat:      Mouth: Mucous membranes are moist.   Eyes:      Extraocular Movements: Extraocular movements intact. Pulmonary:      Effort: Pulmonary effort is normal.   Musculoskeletal:      Right lower leg: Edema present. Left lower leg: Edema present. Skin:     Comments: Hyperpigmentation of bilateral lower extremities consistent with chronic venous stasis disease. Minimal improvement left and right lower traumatic wounds. Healthy wound bed. No signs of infection. Serosanguineous exudate. Neurological:      Mental Status: She is alert. Psychiatric:         Mood and Affect: Mood normal.         Behavior: Behavior normal.                 Wound Instructions:  Orders Placed This Encounter   Procedures    Wound cleansing and dressings     Unna Boot wrap Instructions:    Keep compression wrap/wraps clean and dry.  If wraps are too tight and you experience numbness/tingling, call the wound center. If after hours, remove wraps or proceed to nearest E.R. and call wound center in AM.  Leonila Real will be changed 1 x weekly  Avoid prolonged standing in one place. Elevate leg(s) above the level of the heart when sitting or as much as possible. Wound cleansing and dressings       Left and right lower leg wounds:     Washed with soap and water. Rinsed with normal saline  Shower: no unless cast cover is used. DO NOT GET DRESSING WET. Apply moisturizer to skin surrounding wound  Apply polymem max with silver to bilateral leg wounds. Cover with ABD. Apply unna boot, webril and coban with tubifast yellow. Dressing to be changed weekly in the wound center. Home care on hold this week. This was done today     Standing Status:   Future     Standing Expiration Date:   11/22/2024        Diagnosis ICD-10-CM Associated Orders   1. Traumatic open wound of right lower leg, initial encounter  S81.801A lidocaine (XYLOCAINE) 4 % topical solution 5 mL     Wound cleansing and dressings      2. Traumatic open wound of left lower leg, initial encounter  S81.802A lidocaine (XYLOCAINE) 4 % topical solution 5 mL     Wound cleansing and dressings      3. Lipedema  R60.9 lidocaine (XYLOCAINE) 4 % topical solution 5 mL     Wound cleansing and dressings      4. Lymphedema of both lower extremities  I89.0 lidocaine (XYLOCAINE) 4 % topical solution 5 mL     Wound cleansing and dressings      5. 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HCC)  I70.209 lidocaine (XYLOCAINE) 4 % topical solution 5 mL     Wound cleansing and dressings      6. Chronic anticoagulation  Z79.01 lidocaine (XYLOCAINE) 4 % topical solution 5 mL     Wound cleansing and dressings          --  Rosendo Decker MD    "This note has been constructed using a voice recognition system. Therefore there may be syntax, spelling, and/or grammatical errors.  Occasional wrong word or "sound alike" substitutions may have occurred due to the inherent limitations of voice recognition software. Read the chart carefully and recognize, using context, where substitutions have occurred.  Please call if you have any questions."

## 2023-11-22 NOTE — LETTER
[facility]  [address]  [address] [address]  Phone#  [phone]  Fax#  [phone]    Patient:  Theresa Mar  YOB: 1940  Phone:  [phone]  Date of Visit:  11/22/2023    Orders Placed This Encounter   Procedures   • Wound cleansing and dressings     Unna Boot wrap Instructions:    Keep compression wrap/wraps clean and dry. If wraps are too tight and you experience numbness/tingling, call the wound center. If after hours, remove wraps or proceed to nearest E.R. and call wound center in AM.  Windy Eloina will be changed 1 x weekly  Avoid prolonged standing in one place. Elevate leg(s) above the level of the heart when sitting or as much as possible. Wound cleansing and dressings       Left and right lower leg wounds:     Washed with soap and water. Rinsed with normal saline  Shower: no unless cast cover is used. DO NOT GET DRESSING WET. Apply moisturizer to skin surrounding wound  Apply polymem max with silver to bilateral leg wounds. Cover with ABD. Apply unna boot, webril and coban with tubifast yellow. Dressing to be changed weekly in the wound center. Home care on hold this week.     This was done today     Standing Status:   Future     Standing Expiration Date:   11/22/2024         Electronically signed by Shazia Roberts MD

## 2023-11-27 ENCOUNTER — IN-CLINIC DEVICE VISIT (OUTPATIENT)
Dept: CARDIOLOGY CLINIC | Facility: CLINIC | Age: 83
End: 2023-11-27
Payer: MEDICARE

## 2023-11-27 DIAGNOSIS — Z95.0 PRESENCE OF CARDIAC PACEMAKER: Primary | ICD-10-CM

## 2023-11-27 PROCEDURE — 93279 PRGRMG DEV EVAL PM/LDLS PM: CPT | Performed by: INTERNAL MEDICINE

## 2023-11-27 NOTE — PROGRESS NOTES
Results for orders placed or performed in visit on 11/27/23   Cardiac EP device report    Narrative    MDT-SINGLE CHAMBER PPM/ NOT MRI CONDITIONAL  DEVICE INTERROGATED IN THE [place] OFFICE: BATTERY VOLTAGE NEARING ISABELA (~7 MOS). WILL SCHEDULE MONTHLY BATTERY CHECKS.  29% (VVIR 60PPM); ALL LEAD PARAMETERS WITHIN NORMAL LIMITS/STABLE; MULTIPLE VHR EPISODES WITH ALL AVAILABLE EGM/MARKERS SUGGESTIVE FOR RVR, -173 BPM; HX OF SAME & PATIENT TAKING XARELTO, METOPROLOL TART; NO PROGRAMMING CHANGES MADE TO DEVICE PARAMETERS. NORMAL DEVICE FUNCTION.   ES

## 2023-11-29 ENCOUNTER — OFFICE VISIT (OUTPATIENT)
Dept: WOUND CARE | Facility: HOSPITAL | Age: 83
End: 2023-11-29
Payer: MEDICARE

## 2023-11-29 VITALS
RESPIRATION RATE: 15 BRPM | DIASTOLIC BLOOD PRESSURE: 72 MMHG | SYSTOLIC BLOOD PRESSURE: 109 MMHG | TEMPERATURE: 97.5 F | HEART RATE: 92 BPM

## 2023-11-29 DIAGNOSIS — S81.802A TRAUMATIC OPEN WOUND OF LEFT LOWER LEG, INITIAL ENCOUNTER: ICD-10-CM

## 2023-11-29 DIAGNOSIS — Z79.01 CHRONIC ANTICOAGULATION: ICD-10-CM

## 2023-11-29 DIAGNOSIS — I89.0 LYMPHEDEMA OF BOTH LOWER EXTREMITIES: ICD-10-CM

## 2023-11-29 DIAGNOSIS — I70.209 PERIPHERAL ARTERIOSCLEROSIS (HCC): ICD-10-CM

## 2023-11-29 DIAGNOSIS — R60.9 LIPEDEMA: ICD-10-CM

## 2023-11-29 DIAGNOSIS — S81.801A TRAUMATIC OPEN WOUND OF RIGHT LOWER LEG, INITIAL ENCOUNTER: Primary | ICD-10-CM

## 2023-11-29 PROCEDURE — 97597 DBRDMT OPN WND 1ST 20 CM/<: CPT | Performed by: STUDENT IN AN ORGANIZED HEALTH CARE EDUCATION/TRAINING PROGRAM

## 2023-11-29 RX ORDER — LIDOCAINE HYDROCHLORIDE 40 MG/ML
5 SOLUTION TOPICAL ONCE
Status: COMPLETED | OUTPATIENT
Start: 2023-11-29 | End: 2023-11-29

## 2023-11-29 RX ADMIN — LIDOCAINE HYDROCHLORIDE 5 ML: 40 SOLUTION TOPICAL at 10:21

## 2023-11-29 NOTE — PATIENT INSTRUCTIONS
Orders Placed This Encounter   Procedures    Wound compression and edema control     Unna Boot wrap Instructions:     Keep compression wrap/wraps clean and dry. If wraps are too tight and you experience numbness/tingling, call the wound center. If after hours, remove wraps or proceed to nearest E.R. and call wound center in AM.  Melania Roseanne will be changed 1 x weekly  Avoid prolonged standing in one place. Elevate leg(s) above the level of the heart when sitting or as much as possible. Standing Status:   Future     Standing Expiration Date:   11/29/2024    Wound cleansing and dressings     Left leg wound:     Washed with soap and water. Rinsed with normal saline  Shower: no unless cast cover is used. DO NOT GET DRESSING WET. Apply moisturizer to skin surrounding wound  Apply puracol and then polymem max with silver to left leg wounds. Cover with ABD. Apply unna boot, webril and coban with tubifast yellow. Dressing to be changed weekly in the wound center. Home care on hold this week. This was done today     Standing Status:   Future     Standing Expiration Date:   11/29/2024      Orders Placed This Encounter   Procedures    Wound compression and edema control     Unna Boot wrap Instructions:     Keep compression wrap/wraps clean and dry. If wraps are too tight and you experience numbness/tingling, call the wound center. If after hours, remove wraps or proceed to nearest E.R. and call wound center in AM.  Melania Roseanne will be changed 1 x weekly  Avoid prolonged standing in one place. Elevate leg(s) above the level of the heart when sitting or as much as possible. Standing Status:   Future     Standing Expiration Date:   11/29/2024    Wound cleansing and dressings     Left leg wound:     Washed with soap and water. Rinsed with normal saline  Shower: no unless cast cover is used. DO NOT GET DRESSING WET.   Apply moisturizer to skin surrounding wound  Apply puracol and then polymem max with silver to left leg wounds. Cover with ABD. Apply unna boot, webril and coban with tubifast yellow. Dressing to be changed weekly in the wound center. Home care on hold this week. This was done today     Standing Status:   Future     Standing Expiration Date:   11/29/2024    Wound cleansing and dressings     Right Lower Leg Wound:    Wash your hands with soap and water. Remove old dressing, discard into plastic bag and place in trash. Cleanse the wound with normal saline prior to applying a clean dressing. Do not use tissue or cotton balls. Do not scrub the wound. Pat dry using gauze. Shower yes IF you wear a cast cover to the wraps; do not get the wraps wet. Apply moisturizer to skin surrounding wound  Apply Puracol to the right leg wound. Cover with ABD  Secure with Alexandre  Change dressing weekly at [address] [place] [address]. This was done today.      Standing Status:   Future     Standing Expiration Date:   11/29/2024

## 2023-11-29 NOTE — PROGRESS NOTES
Patient ID: Zayda Cheney is a 82 y.o. female Date of Birth 1940     Chief Complaint  Chief Complaint   Patient presents with    Follow Up Wound Care Visit     RLE and LLE       Allergies  Latex, Cephalexin, Duloxetine hcl, Erythromycin, Levofloxacin, Penicillins, Savella [milnacipran], and Sulfa antibiotics    Assessment:       Diagnoses and all orders for this visit:    Traumatic open wound of right lower leg, initial encounter  -     lidocaine (XYLOCAINE) 4 % topical solution 5 mL  -     Cancel: Wound cleansing and dressings; Future  -     Wound compression and edema control; Future  -     Wound cleansing and dressings; Future  -     Wound cleansing and dressings; Future  -     Debridement    Traumatic open wound of left lower leg, initial encounter  -     lidocaine (XYLOCAINE) 4 % topical solution 5 mL  -     Cancel: Wound cleansing and dressings; Future  -     Wound compression and edema control; Future  -     Wound cleansing and dressings; Future  -     Wound cleansing and dressings; Future  -     Debridement    Lymphedema of both lower extremities  -     Cancel: Wound cleansing and dressings; Future  -     Wound compression and edema control; Future  -     Wound cleansing and dressings; Future  -     Wound cleansing and dressings; Future    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HCC)  -     Cancel: Wound cleansing and dressings; Future  -     Wound compression and edema control; Future  -     Wound cleansing and dressings; Future  -     Wound cleansing and dressings; Future    Chronic anticoagulation  -     Cancel: Wound cleansing and dressings; Future  -     Wound compression and edema control; Future  -     Wound cleansing and dressings; Future  -     Wound cleansing and dressings; Future    Lipedema  -     Cancel: Wound cleansing and dressings; Future  -     Wound compression and edema control; Future  -     Wound cleansing and dressings; Future  -     Wound cleansing and dressings;  Future Debridement   Wound 09/19/23 Traumatic Leg Left;Lateral    Universal Protocol:  Consent: Verbal consent obtained. Risks and benefits: risks, benefits and alternatives were discussed  Consent given by: patient  Time out: Immediately prior to procedure a "time out" was called to verify the correct patient, procedure, equipment, support staff and site/side marked as required. Patient identity confirmed: verbally with patient    Performed by: physician  Debridement type: selective  Pain control: lidocaine 4%  Post-debridement measurements  Length (cm): 3.2  Width (cm): 2.4  Depth (cm): 0.2  Percent debrided: 90%  Surface Area (cm^2): 7.68  Area debrided (cm^2): 6.91  Volume (cm^3): 1.54  Devitalized tissue debrided: biofilm and exudate  Instrument(s) utilized: curette  Bleeding: small  Hemostasis obtained with: pressure  Procedural pain (0-10): 1  Post-procedural pain: 0   Response to treatment: procedure was tolerated well    Debridement   Wound 09/26/23 Traumatic Leg Right; Anterior    Universal Protocol:  Consent: Verbal consent obtained. Risks and benefits: risks, benefits and alternatives were discussed  Consent given by: patient  Time out: Immediately prior to procedure a "time out" was called to verify the correct patient, procedure, equipment, support staff and site/side marked as required. Patient identity confirmed: verbally with patient    Performed by: physician  Debridement type: selective  Pain control: lidocaine 4%  Post-debridement measurements  Length (cm): 0.3  Width (cm): 0.4  Depth (cm): 0.2  Percent debrided: 90%  Surface Area (cm^2): 0.12  Area debrided (cm^2): 0.11  Volume (cm^3): 0.02  Devitalized tissue debrided: biofilm and exudate  Instrument(s) utilized: curette  Bleeding: small  Hemostasis obtained with: pressure  Procedural pain (0-10): 1  Post-procedural pain: 0   Response to treatment: procedure was tolerated well        Plan:    It was a pleasure to see Greyson Aragon for wound care follow up today  Selective debridement performed today as above  Wounds are improving   Continue plan of care as noted below with puracol to both wounds with addition of polymem to LLE wound, BL unna boots  No signs or symptoms of infection today. Patient understands that if any signs of infection start (such as increased redness, drainage, pain, fever, chills, diaphoresis), they should call our office or proceed to the ER or Urgent Care. Patient should continue a high protein diet to facilitate wound healing  Patient is advised to not submerge wound or leave wound open to air. Follow up in 1 weeks  Given the multi-factorial nature of wound care, additional time was taken to review patient's treatment plan with other specialties and most recent pertinent lab work and imaging. All plans of care discussed with patient at bedside who verbalized understanding with treatment plan. Wound 09/19/23 Traumatic Leg Left;Lateral (Active)   Wound Image Images linked 11/29/23 1018   Wound Description Beefy red;Yellow 11/29/23 1018   Dorys-wound Assessment Dry;Hyperpigmented 11/29/23 1018   Wound Length (cm) 3.2 cm 11/29/23 1018   Wound Width (cm) 2.4 cm 11/29/23 1018   Wound Surface Area (cm^2) 7.68 cm^2 11/29/23 1018   Drainage Amount Moderate 11/29/23 1018   Drainage Description Serosanguineous 11/29/23 1018   Non-staged Wound Description Full thickness 11/29/23 1018   Dressing Status Intact (upon arrival) 11/29/23 1018       Wound 09/26/23 Traumatic Leg Right; Anterior (Active)   Wound Image Images linked 11/29/23 1017   Wound Description Other (Comment); Beefy red;Slough; Yellow (small scab) 11/29/23 1016   Dorys-wound Assessment Dry;Hyperpigmented 11/29/23 1016   Wound Length (cm) 0.3 cm 11/29/23 1016   Wound Width (cm) 0.4 cm 11/29/23 1016   Wound Depth (cm) 0.2 cm 11/29/23 1016   Wound Surface Area (cm^2) 0.12 cm^2 11/29/23 1016   Wound Volume (cm^3) 0.024 cm^3 11/29/23 1016   Calculated Wound Volume (cm^3) 0.02 cm^3 11/29/23 1016   Change in Wound Size % 60 11/29/23 1016   Drainage Amount Small 11/29/23 1016   Drainage Description Serosanguineous 11/29/23 1016   Non-staged Wound Description Full thickness 11/29/23 1016   Dressing Status Intact (upon arrival) 11/29/23 1016       Wound 09/19/23 Traumatic Leg Left;Lateral (Active)   Date First Assessed/Time First Assessed: 09/19/23 0938   Primary Wound Type: Traumatic  Location: Leg  Wound Location Orientation: Left;Lateral       Wound 09/26/23 Traumatic Leg Right; Anterior (Active)   Date First Assessed/Time First Assessed: 09/26/23 0950   Primary Wound Type: Traumatic  Location: Leg  Wound Location Orientation: Right; Anterior       [REMOVED] Wound 08/17/20 Knee Left (Removed)   Resolved Date: 09/19/23  Date First Assessed/Time First Assessed: 08/17/20 0936   Location: Knee  Wound Location Orientation: Left  Wound Description (Comments): darryl stocking  Incision's 1st Dressing: ADHESIVE SKIN CLOSR DERMABOND EO, DRESSING MEP. .. [REMOVED] Wound 03/28/22 Knee Right (Removed)   Resolved Date: 09/19/23  Date First Assessed/Time First Assessed: 03/28/22 0854   Location: Knee  Wound Location Orientation: Right  Wound Description (Comments): DARRYL stockings applied to bilateral lower legs  Incision's 1st Dressing: ADHESIVE SKIN HI. .. [REMOVED] Wound 04/11/22 Surgical Knee Right (Removed)   Resolved Date: 09/19/23  Date First Assessed/Time First Assessed: 04/11/22 2000   Primary Wound Type: Surgical  Location: Knee  Wound Location Orientation: Right  Wound Outcome: Unknown (No longer present)       [REMOVED] Wound 09/05/23 Catheter entry/exit site Wrist Right (Removed)   Resolved Date: 09/19/23  Date First Assessed/Time First Assessed: 09/05/23 0921   Traumatic Wound Type: Catheter entry/exit site  Location: Wrist  Wound Location Orientation: Right  Wound Outcome: Unknown (No longer present)       [REMOVED] Wound 09/05/23 Skin Tear Pretibial Left; Outer (Removed) Resolved Date: 09/19/23  Date First Assessed/Time First Assessed: 09/05/23 0730   Present on Original Admission: Yes  Primary Wound Type: Skin Tear  Location: Pretibial  Wound Location Orientation: Left; Outer  Wound Outcome: (c) Other (Comment)       Subjective:      .    11/29/23, 11/22/23: Happy with wound healing. No symptoms of infection. 11/15/23: Patient is happy with wound healing. Note skin tear on lateral left lower extremity. No symptoms of infection. 11/8/23: Patient is happy with wound healing. Notes that there is less drainage from the wound. No symptoms of infection today. 11/1/23, 10/24/23: Patient happy with wound healing. Wounds are looking better today. No symptoms of infection. 10/10/23: Wound care being performed by patient's son and [facility]NA who are coming twice per week. Patient notes that she did have increased leg swelling since her last visit. Overall happy with wound healing     9/26/23: Dressing changes from [facility] and her son. Denies any local or systemic symptoms of infection today. Happy with wound healing. Has been elevating legs. 9/19/23: Singh Harding is a pleasant 65-year-old female with a past medical history of type 2 diabetes mellitus most recent A1c 5.8% 1 month ago, diabetic polyneuropathy, atrial  fibrillation on Xarelto, obesity, history of heart failure with most recent echocardiogram showing normal function here today for initial wound care consult. Per chart review patient sustained the wound of her left lower extremity on August 21, 2023 when she closed a car door on her leg. She went the next day to her PCP and was advised to apply warm compresses. She has been seen in the ED multiple times for wound checks. Initially she has been put on clindamycin and given Tdap vaccination. Wound culture taken which grew 1+ staph coag negative, and later wound culture grew 1+ Staph epidermidis during inpatient stay.   She was admitted for 3 days from 9/5 to 9/8/2023 at [address] [place] [address] and underwent bedside I&D with placement of wound VAC by podiatry. Arterial duplex showed no significant arterial disease however PVR tracings were dampened. Great toe pressure  within healing range. Blood cultures were negative. She is instructed to follow-up outpatient with podiatry and VNA was set up for patient. Recently patient was seen again in ER on September 15, 2023 when there was concern again for infection of the wound. She was prescribed clindamycin and referred to wound management. Referral was placed by physician assistant Lesli Nance. Today patient continues to take clindamycin as directed. Denies any local or systemic signs of infection including fever chills diaphoresis. 9/5/23: LEADs  RIGHT LOWER LIMB:  No evidence of significant lower extremity arterial occlusive disease. Ankle/Brachial index: 2.11 which is unreliable due to non compressible vessels. PVR/ PPG tracings are dampened. Metatarsal pressure of 109 mmHg  Great toe pressure of 81 mmHg, within the healing range. LEFT LOWER LIMB:  No evidence of significant lower extremity arterial occlusive disease. Ankle/Brachial index: 2.11 which is unreliable due to non compressible vessels. PVR/ PPG tracings are dampened. Metatarsal pressure of 152 mmHg  Great toe pressure of 92 mmHg, within the healing range. The following portions of the patient's history were reviewed and updated as appropriate: allergies, current medications, past family history, past medical history, past social history, past surgical history, and problem list.    Review of Systems   Constitutional:  Negative for chills, diaphoresis, fatigue and fever. Skin:  Positive for wound. All other systems reviewed and are negative.         Objective:       Wound 09/19/23 Traumatic Leg Left;Lateral (Active)   Wound Image Images linked 11/29/23 1018   Wound Description Beefy red;Yellow 11/29/23 1018 Dorys-wound Assessment Dry;Hyperpigmented 11/29/23 1018   Wound Length (cm) 3.2 cm 11/29/23 1018   Wound Width (cm) 2.4 cm 11/29/23 1018   Wound Surface Area (cm^2) 7.68 cm^2 11/29/23 1018   Drainage Amount Moderate 11/29/23 1018   Drainage Description Serosanguineous 11/29/23 1018   Non-staged Wound Description Full thickness 11/29/23 1018   Dressing Status Intact (upon arrival) 11/29/23 1018       Wound 09/26/23 Traumatic Leg Right; Anterior (Active)   Wound Image Images linked 11/29/23 1017   Wound Description Other (Comment); Beefy red;Slough; Yellow (small scab) 11/29/23 1016   Dorys-wound Assessment Dry;Hyperpigmented 11/29/23 1016   Wound Length (cm) 0.3 cm 11/29/23 1016   Wound Width (cm) 0.4 cm 11/29/23 1016   Wound Depth (cm) 0.2 cm 11/29/23 1016   Wound Surface Area (cm^2) 0.12 cm^2 11/29/23 1016   Wound Volume (cm^3) 0.024 cm^3 11/29/23 1016   Calculated Wound Volume (cm^3) 0.02 cm^3 11/29/23 1016   Change in Wound Size % 60 11/29/23 1016   Drainage Amount Small 11/29/23 1016   Drainage Description Serosanguineous 11/29/23 1016   Non-staged Wound Description Full thickness 11/29/23 1016   Dressing Status Intact (upon arrival) 11/29/23 1016       /72   Pulse 92   Temp 97.5 °F (36.4 °C)   Resp 15     Physical Exam  Vitals reviewed. Constitutional:       Appearance: Normal appearance. HENT:      Head: Normocephalic and atraumatic. Mouth/Throat:      Mouth: Mucous membranes are moist.   Eyes:      Extraocular Movements: Extraocular movements intact. Pulmonary:      Effort: Pulmonary effort is normal.   Musculoskeletal:      Right lower leg: Edema present. Left lower leg: Edema present. Skin:     Comments: Bilateral lower extremity wounds smaller than last exam.  Prior skin tear is fully epithelialized. No signs of infection. Neurological:      Mental Status: She is alert.    Psychiatric:         Mood and Affect: Mood normal.         Behavior: Behavior normal.                 Wound Instructions:  Orders Placed This Encounter   Procedures    Wound compression and edema control     Unna Boot wrap Instructions:     Keep compression wrap/wraps clean and dry. If wraps are too tight and you experience numbness/tingling, call the wound center. If after hours, remove wraps or proceed to nearest E.R. and call wound center in AM.  Leonila Real will be changed 1 x weekly  Avoid prolonged standing in one place. Elevate leg(s) above the level of the heart when sitting or as much as possible. Standing Status:   Future     Standing Expiration Date:   11/29/2024    Wound cleansing and dressings     Left leg wound:     Washed with soap and water. Rinsed with normal saline  Shower: no unless cast cover is used. DO NOT GET DRESSING WET. Apply moisturizer to skin surrounding wound  Apply puracol and then polymem max with silver to left leg wounds. Cover with ABD. Apply unna boot, webril and coban with tubifast yellow. Dressing to be changed weekly in the wound center. Home care on hold this week. This was done today     Standing Status:   Future     Standing Expiration Date:   11/29/2024    Wound cleansing and dressings     Right Lower Leg Wound:    Wash your hands with soap and water. Remove old dressing, discard into plastic bag and place in trash. Cleanse the wound with normal saline prior to applying a clean dressing. Do not use tissue or cotton balls. Do not scrub the wound. Pat dry using gauze. Shower yes IF you wear a cast cover to the wraps; do not get the wraps wet. Apply moisturizer to skin surrounding wound  Apply Puracol to the right leg wound. Cover with ABD  Secure with Alexandre  Change dressing weekly at [address] [place] [address]. This was done today.      Standing Status:   Future     Standing Expiration Date:   11/29/2024    Debridement     This order was created via procedure documentation    Debridement     This order was created via procedure documentation        Diagnosis ICD-10-CM Associated Orders   1. Traumatic open wound of right lower leg, initial encounter  S81.801A lidocaine (XYLOCAINE) 4 % topical solution 5 mL     Wound compression and edema control     Wound cleansing and dressings     Wound cleansing and dressings     Debridement      2. Traumatic open wound of left lower leg, initial encounter  S81.802A lidocaine (XYLOCAINE) 4 % topical solution 5 mL     Wound compression and edema control     Wound cleansing and dressings     Wound cleansing and dressings     Debridement      3. Lymphedema of both lower extremities  I89.0 Wound compression and edema control     Wound cleansing and dressings     Wound cleansing and dressings      4. 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HCC)  I70.209 Wound compression and edema control     Wound cleansing and dressings     Wound cleansing and dressings      5. Chronic anticoagulation  Z79.01 Wound compression and edema control     Wound cleansing and dressings     Wound cleansing and dressings      6. Lipedema  R60.9 Wound compression and edema control     Wound cleansing and dressings     Wound cleansing and dressings          --  Melissa Delacruz MD    "This note has been constructed using a voice recognition system. Therefore there may be syntax, spelling, and/or grammatical errors. Occasional wrong word or "sound alike" substitutions may have occurred due to the inherent limitations of voice recognition software. Read the chart carefully and recognize, using context, where substitutions have occurred.  Please call if you have any questions."

## 2023-12-01 ENCOUNTER — TELEPHONE (OUTPATIENT)
Dept: WOUND CARE | Facility: HOSPITAL | Age: 83
End: 2023-12-01

## 2023-12-01 NOTE — TELEPHONE ENCOUNTER
Returned phone call to pt. Pt called with concern that she could see that the right leg had bled a little and she could see that the puracol had not dissolved. This wound is close to the top of the unna wrap. Advised her that it is okay if the puracol did not dissolve; it can mean there is less drainage; advised her that we will evaluate this at next visit. Unna Boot wrap remains intact without problem. Pt verbalized understanding.

## 2023-12-02 ENCOUNTER — APPOINTMENT (EMERGENCY)
Dept: RADIOLOGY | Facility: HOSPITAL | Age: 83
DRG: 603 | End: 2023-12-02
Payer: MEDICARE

## 2023-12-02 ENCOUNTER — HOSPITAL ENCOUNTER (INPATIENT)
Facility: HOSPITAL | Age: 83
LOS: 2 days | Discharge: HOME | DRG: 603 | End: 2023-12-04
Attending: EMERGENCY MEDICINE | Admitting: STUDENT IN AN ORGANIZED HEALTH CARE EDUCATION/TRAINING PROGRAM
Payer: MEDICARE

## 2023-12-02 DIAGNOSIS — R79.1 ELEVATED INR: ICD-10-CM

## 2023-12-02 DIAGNOSIS — L03.115 CELLULITIS OF RIGHT LOWER EXTREMITY: Primary | ICD-10-CM

## 2023-12-02 PROBLEM — D68.9 COAGULOPATHY (HCC): Status: ACTIVE | Noted: 2022-05-22

## 2023-12-02 LAB
ALBUMIN SERPL BCP-MCNC: 4.2 G/DL (ref 3.5–5)
ALP SERPL-CCNC: 74 U/L (ref 34–104)
ALT SERPL W P-5'-P-CCNC: 18 U/L (ref 7–52)
ANION GAP SERPL CALCULATED.3IONS-SCNC: 7 MMOL/L
APTT PPP: 68 SECONDS (ref 23–37)
APTT PPP: 68 SECONDS (ref 23–37)
APTT PPP: 70 SECONDS (ref 23–37)
AST SERPL W P-5'-P-CCNC: 24 U/L (ref 13–39)
BACTERIA UR QL AUTO: ABNORMAL /HPF
BASOPHILS # BLD AUTO: 0.03 THOUSANDS/ÂΜL (ref 0–0.1)
BASOPHILS NFR BLD AUTO: 0 % (ref 0–1)
BILIRUB SERPL-MCNC: 1.01 MG/DL (ref 0.2–1)
BILIRUB UR QL STRIP: NEGATIVE
BUN SERPL-MCNC: 27 MG/DL (ref 5–25)
CALCIUM SERPL-MCNC: 9.7 MG/DL (ref 8.4–10.2)
CHLORIDE SERPL-SCNC: 101 MMOL/L (ref 96–108)
CLARITY UR: CLEAR
CO2 SERPL-SCNC: 32 MMOL/L (ref 21–32)
COLOR UR: YELLOW
CREAT SERPL-MCNC: 1 MG/DL (ref 0.6–1.3)
EOSINOPHIL # BLD AUTO: 0.06 THOUSAND/ÂΜL (ref 0–0.61)
EOSINOPHIL NFR BLD AUTO: 1 % (ref 0–6)
ERYTHROCYTE [DISTWIDTH] IN BLOOD BY AUTOMATED COUNT: 14.2 % (ref 11.6–15.1)
FLUAV RNA RESP QL NAA+PROBE: NEGATIVE
FLUBV RNA RESP QL NAA+PROBE: NEGATIVE
GFR SERPL CREATININE-BSD FRML MDRD: 53 ML/MIN/1.73SQ M
GLUCOSE SERPL-MCNC: 86 MG/DL (ref 65–140)
GLUCOSE UR STRIP-MCNC: NEGATIVE MG/DL
HCT VFR BLD AUTO: 40.6 % (ref 34.8–46.1)
HGB BLD-MCNC: 13 G/DL (ref 11.5–15.4)
HGB UR QL STRIP.AUTO: NEGATIVE
IMM GRANULOCYTES # BLD AUTO: 0.03 THOUSAND/UL (ref 0–0.2)
IMM GRANULOCYTES NFR BLD AUTO: 0 % (ref 0–2)
INR PPP: 5.7 (ref 0.84–1.19)
INR PPP: 6.05 (ref 0.84–1.19)
KETONES UR STRIP-MCNC: NEGATIVE MG/DL
LACTATE SERPL-SCNC: 0.9 MMOL/L (ref 0.5–2)
LEUKOCYTE ESTERASE UR QL STRIP: ABNORMAL
LYMPHOCYTES # BLD AUTO: 1.46 THOUSANDS/ÂΜL (ref 0.6–4.47)
LYMPHOCYTES NFR BLD AUTO: 15 % (ref 14–44)
MAGNESIUM SERPL-MCNC: 2.4 MG/DL (ref 1.9–2.7)
MCH RBC QN AUTO: 30.7 PG (ref 26.8–34.3)
MCHC RBC AUTO-ENTMCNC: 32 G/DL (ref 31.4–37.4)
MCV RBC AUTO: 96 FL (ref 82–98)
MONOCYTES # BLD AUTO: 0.76 THOUSAND/ÂΜL (ref 0.17–1.22)
MONOCYTES NFR BLD AUTO: 8 % (ref 4–12)
NEUTROPHILS # BLD AUTO: 7.5 THOUSANDS/ÂΜL (ref 1.85–7.62)
NEUTS SEG NFR BLD AUTO: 76 % (ref 43–75)
NITRITE UR QL STRIP: NEGATIVE
NON-SQ EPI CELLS URNS QL MICRO: ABNORMAL /HPF
NRBC BLD AUTO-RTO: 0 /100 WBCS
PH UR STRIP.AUTO: 7.5 [PH]
PLATELET # BLD AUTO: 186 THOUSANDS/UL (ref 149–390)
PMV BLD AUTO: 10.7 FL (ref 8.9–12.7)
POTASSIUM SERPL-SCNC: 4 MMOL/L (ref 3.5–5.3)
PROCALCITONIN SERPL-MCNC: 0.07 NG/ML
PROT SERPL-MCNC: 7.8 G/DL (ref 6.4–8.4)
PROT UR STRIP-MCNC: ABNORMAL MG/DL
PROTHROMBIN TIME: 51.1 SECONDS (ref 11.6–14.5)
PROTHROMBIN TIME: 53.5 SECONDS (ref 11.6–14.5)
PROTHROMBIN TIME: 53.5 SECONDS (ref 11.6–14.5)
PT 1H NP PPP: 25.4 SEC (ref 12–14.3)
PT IMM NP PPP: 24.4 SECONDS (ref 12–14.3)
RBC # BLD AUTO: 4.23 MILLION/UL (ref 3.81–5.12)
RBC #/AREA URNS AUTO: ABNORMAL /HPF
RSV RNA RESP QL NAA+PROBE: NEGATIVE
SARS-COV-2 RNA RESP QL NAA+PROBE: NEGATIVE
SODIUM SERPL-SCNC: 140 MMOL/L (ref 135–147)
SP GR UR STRIP.AUTO: 1.01 (ref 1–1.03)
UROBILINOGEN UR QL STRIP.AUTO: 2 E.U./DL
WBC # BLD AUTO: 9.84 THOUSAND/UL (ref 4.31–10.16)
WBC #/AREA URNS AUTO: ABNORMAL /HPF

## 2023-12-02 PROCEDURE — 93971 EXTREMITY STUDY: CPT

## 2023-12-02 PROCEDURE — 85732 THROMBOPLASTIN TIME PARTIAL: CPT | Performed by: PHYSICIAN ASSISTANT

## 2023-12-02 PROCEDURE — 83605 ASSAY OF LACTIC ACID: CPT | Performed by: PHYSICIAN ASSISTANT

## 2023-12-02 PROCEDURE — 93971 EXTREMITY STUDY: CPT | Performed by: SURGERY

## 2023-12-02 PROCEDURE — 81001 URINALYSIS AUTO W/SCOPE: CPT | Performed by: PHYSICIAN ASSISTANT

## 2023-12-02 PROCEDURE — 85610 PROTHROMBIN TIME: CPT | Performed by: PHYSICIAN ASSISTANT

## 2023-12-02 PROCEDURE — 87086 URINE CULTURE/COLONY COUNT: CPT | Performed by: PHYSICIAN ASSISTANT

## 2023-12-02 PROCEDURE — 83735 ASSAY OF MAGNESIUM: CPT | Performed by: PHYSICIAN ASSISTANT

## 2023-12-02 PROCEDURE — 0241U HB NFCT DS VIR RESP RNA 4 TRGT: CPT | Performed by: PHYSICIAN ASSISTANT

## 2023-12-02 PROCEDURE — 99285 EMERGENCY DEPT VISIT HI MDM: CPT | Performed by: PHYSICIAN ASSISTANT

## 2023-12-02 PROCEDURE — 80053 COMPREHEN METABOLIC PANEL: CPT | Performed by: PHYSICIAN ASSISTANT

## 2023-12-02 PROCEDURE — 99284 EMERGENCY DEPT VISIT MOD MDM: CPT

## 2023-12-02 PROCEDURE — 99223 1ST HOSP IP/OBS HIGH 75: CPT | Performed by: INTERNAL MEDICINE

## 2023-12-02 PROCEDURE — 85730 THROMBOPLASTIN TIME PARTIAL: CPT | Performed by: PHYSICIAN ASSISTANT

## 2023-12-02 PROCEDURE — 87040 BLOOD CULTURE FOR BACTERIA: CPT | Performed by: PHYSICIAN ASSISTANT

## 2023-12-02 PROCEDURE — 96365 THER/PROPH/DIAG IV INF INIT: CPT

## 2023-12-02 PROCEDURE — 85025 COMPLETE CBC W/AUTO DIFF WBC: CPT | Performed by: PHYSICIAN ASSISTANT

## 2023-12-02 PROCEDURE — 36415 COLL VENOUS BLD VENIPUNCTURE: CPT | Performed by: PHYSICIAN ASSISTANT

## 2023-12-02 PROCEDURE — 85611 PROTHROMBIN TEST: CPT | Performed by: PHYSICIAN ASSISTANT

## 2023-12-02 PROCEDURE — 84145 PROCALCITONIN (PCT): CPT | Performed by: PHYSICIAN ASSISTANT

## 2023-12-02 PROCEDURE — 96367 TX/PROPH/DG ADDL SEQ IV INF: CPT

## 2023-12-02 PROCEDURE — 93005 ELECTROCARDIOGRAM TRACING: CPT

## 2023-12-02 RX ORDER — ONDANSETRON 2 MG/ML
4 INJECTION INTRAMUSCULAR; INTRAVENOUS EVERY 4 HOURS PRN
Status: DISCONTINUED | OUTPATIENT
Start: 2023-12-02 | End: 2023-12-04 | Stop reason: HOSPADM

## 2023-12-02 RX ORDER — MAGNESIUM CHLORIDE 64 MG
64 TABLET, DELAYED RELEASE (ENTERIC COATED) ORAL DAILY
Status: DISCONTINUED | OUTPATIENT
Start: 2023-12-02 | End: 2023-12-02

## 2023-12-02 RX ORDER — PRAMIPEXOLE DIHYDROCHLORIDE 0.5 MG/1
0.5 TABLET ORAL 2 TIMES DAILY
Status: DISCONTINUED | OUTPATIENT
Start: 2023-12-02 | End: 2023-12-04 | Stop reason: HOSPADM

## 2023-12-02 RX ORDER — FAMOTIDINE 20 MG/1
20 TABLET, FILM COATED ORAL DAILY
Status: DISCONTINUED | OUTPATIENT
Start: 2023-12-02 | End: 2023-12-04 | Stop reason: HOSPADM

## 2023-12-02 RX ORDER — FLUTICASONE PROPIONATE 50 MCG
1 SPRAY, SUSPENSION (ML) NASAL DAILY
Status: DISCONTINUED | OUTPATIENT
Start: 2023-12-02 | End: 2023-12-04 | Stop reason: HOSPADM

## 2023-12-02 RX ORDER — GABAPENTIN 400 MG/1
800 CAPSULE ORAL 4 TIMES DAILY
Status: DISCONTINUED | OUTPATIENT
Start: 2023-12-02 | End: 2023-12-04 | Stop reason: HOSPADM

## 2023-12-02 RX ORDER — PANTOPRAZOLE SODIUM 20 MG/1
20 TABLET, DELAYED RELEASE ORAL DAILY
Status: DISCONTINUED | OUTPATIENT
Start: 2023-12-03 | End: 2023-12-04 | Stop reason: HOSPADM

## 2023-12-02 RX ORDER — LANOLIN ALCOHOL/MO/W.PET/CERES
400 CREAM (GRAM) TOPICAL 2 TIMES DAILY
Status: DISCONTINUED | OUTPATIENT
Start: 2023-12-02 | End: 2023-12-04 | Stop reason: HOSPADM

## 2023-12-02 RX ORDER — LORATADINE 10 MG/1
10 TABLET ORAL DAILY
Status: DISCONTINUED | OUTPATIENT
Start: 2023-12-02 | End: 2023-12-04 | Stop reason: HOSPADM

## 2023-12-02 RX ORDER — ACETAMINOPHEN 325 MG/1
650 TABLET ORAL EVERY 4 HOURS PRN
Status: DISCONTINUED | OUTPATIENT
Start: 2023-12-02 | End: 2023-12-04 | Stop reason: HOSPADM

## 2023-12-02 RX ORDER — TORSEMIDE 20 MG/1
20 TABLET ORAL DAILY
Status: DISCONTINUED | OUTPATIENT
Start: 2023-12-03 | End: 2023-12-04 | Stop reason: HOSPADM

## 2023-12-02 RX ORDER — ASCORBIC ACID 500 MG
500 TABLET ORAL 2 TIMES DAILY
Status: DISCONTINUED | OUTPATIENT
Start: 2023-12-02 | End: 2023-12-04 | Stop reason: HOSPADM

## 2023-12-02 RX ADMIN — FLUTICASONE PROPIONATE 1 SPRAY: 50 SPRAY, METERED NASAL at 17:20

## 2023-12-02 RX ADMIN — GABAPENTIN 800 MG: 400 CAPSULE ORAL at 21:43

## 2023-12-02 RX ADMIN — PRAMIPEXOLE DIHYDROCHLORIDE 0.5 MG: 0.5 TABLET ORAL at 21:44

## 2023-12-02 RX ADMIN — LORATADINE 10 MG: 10 TABLET ORAL at 17:23

## 2023-12-02 RX ADMIN — ACETAMINOPHEN 650 MG: 325 TABLET ORAL at 21:45

## 2023-12-02 RX ADMIN — OXYCODONE HYDROCHLORIDE AND ACETAMINOPHEN 500 MG: 500 TABLET ORAL at 17:20

## 2023-12-02 RX ADMIN — AZTREONAM 2000 MG: 2 INJECTION, POWDER, LYOPHILIZED, FOR SOLUTION INTRAMUSCULAR; INTRAVENOUS at 10:18

## 2023-12-02 RX ADMIN — PRAMIPEXOLE DIHYDROCHLORIDE 0.5 MG: 0.5 TABLET ORAL at 17:19

## 2023-12-02 RX ADMIN — METOPROLOL TARTRATE 75 MG: 50 TABLET, FILM COATED ORAL at 21:44

## 2023-12-02 RX ADMIN — VANCOMYCIN HYDROCHLORIDE 1750 MG: 10 INJECTION, POWDER, LYOPHILIZED, FOR SOLUTION INTRAVENOUS at 11:26

## 2023-12-02 RX ADMIN — Medication 400 MG: at 18:12

## 2023-12-02 RX ADMIN — GABAPENTIN 800 MG: 400 CAPSULE ORAL at 17:19

## 2023-12-02 RX ADMIN — FAMOTIDINE 20 MG: 20 TABLET ORAL at 17:20

## 2023-12-02 NOTE — ASSESSMENT & PLAN NOTE
Wt Readings from Last 3 Encounters:   12/02/23 101 kg (222 lb)   10/20/23 102 kg (225 lb 8 oz)   10/11/23 103 kg (228 lb)     Compensated. Continue torsemide home dosing.

## 2023-12-02 NOTE — ASSESSMENT & PLAN NOTE
Polyneuropathy follows with neurology as an outpatient  Confirmed dosing and reviewed on .   Continue gabapentin 800 mg 4 times daily

## 2023-12-02 NOTE — PLAN OF CARE
Problem: PAIN - ADULT  Goal: Verbalizes/displays adequate comfort level or baseline comfort level  Description: Interventions:  - Encourage patient to monitor pain and request assistance  - Assess pain using appropriate pain scale  - Administer analgesics based on type and severity of pain and evaluate response  - Implement non-pharmacological measures as appropriate and evaluate response  - Consider cultural and social influences on pain and pain management  - Notify physician/advanced practitioner if interventions unsuccessful or patient reports new pain  Outcome: Progressing     Problem: INFECTION - ADULT  Goal: Absence or prevention of progression during hospitalization  Description: INTERVENTIONS:  - Assess and monitor for signs and symptoms of infection  - Monitor lab/diagnostic results  - Monitor all insertion sites, i.e. indwelling lines, tubes, and drains  - Monitor endotracheal if appropriate and nasal secretions for changes in amount and color  - [place] appropriate cooling/warming therapies per order  - Administer medications as ordered  - Instruct and encourage patient and family to use good hand hygiene technique  - Identify and instruct in appropriate isolation precautions for identified infection/condition  Outcome: Progressing  Goal: Absence of fever/infection during neutropenic period  Description: INTERVENTIONS:  - Monitor WBC    Outcome: Progressing     Problem: SAFETY ADULT  Goal: Patient will remain free of falls  Description: INTERVENTIONS:  - Educate patient/family on patient safety including physical limitations  - Instruct patient to call for assistance with activity   - Consult OT/PT to assist with strengthening/mobility   - Keep Call bell within reach  - Keep bed low and locked with side rails adjusted as appropriate  - Keep care items and personal belongings within reach  - Initiate and maintain comfort rounds  - Make Fall Risk Sign visible to staff  - Offer Toileting every 2 Hours, in advance of need  - Initiate/Maintain bed alarm  - Obtain necessary fall risk management equipment: slipper socks, walker  - Apply yellow socks and bracelet for high fall risk patients  - Consider moving patient to room near nurses station  Outcome: Progressing  Goal: Maintain or return to baseline ADL function  Description: INTERVENTIONS:  -  Assess patient's ability to carry out ADLs; assess patient's baseline for ADL function and identify physical deficits which impact ability to perform ADLs (bathing, care of mouth/teeth, toileting, grooming, dressing, etc.)  - Assess/evaluate cause of self-care deficits   - Assess range of motion  - Assess patient's mobility; develop plan if impaired  - Assess patient's need for assistive devices and provide as appropriate  - Encourage maximum independence but intervene and supervise when necessary  - Involve family in performance of ADLs  - Assess for home care needs following discharge   - Consider OT consult to assist with ADL evaluation and planning for discharge  - Provide patient education as appropriate  Outcome: Progressing  Goal: Maintains/Returns to pre admission functional level  Description: INTERVENTIONS:  - Perform AM-PAC 6 Click Basic Mobility/ Daily Activity assessment daily.  - Set and communicate daily mobility goal to care team and patient/family/caregiver. - Collaborate with rehabilitation services on mobility goals if consulted  - Perform Range of Motion 3 times a day. - Reposition patient every 2 hours.   - Dangle patient 3 times a day  - Stand patient 3 times a day  - Ambulate patient 3 times a day  - Out of bed to chair 3 times a day   - Out of bed for meals 3 times a day  - Out of bed for toileting  - Record patient progress and toleration of activity level   Outcome: Progressing     Problem: DISCHARGE PLANNING  Goal: Discharge to home or other facility with appropriate resources  Description: INTERVENTIONS:  - Identify barriers to discharge w/patient and caregiver  - Arrange for needed discharge resources and transportation as appropriate  - Identify discharge learning needs (meds, wound care, etc.)  - Arrange for interpretive services to assist at discharge as needed  - Refer to Case Management Department for coordinating discharge planning if the patient needs post-hospital services based on physician/advanced practitioner order or complex needs related to functional status, cognitive ability, or social support system  Outcome: Progressing     Problem: Knowledge Deficit  Goal: Patient/family/caregiver demonstrates understanding of disease process, treatment plan, medications, and discharge instructions  Description: Complete learning assessment and assess knowledge base.   Interventions:  - Provide teaching at level of understanding  - Provide teaching via preferred learning methods  Outcome: Progressing     Problem: SKIN/TISSUE INTEGRITY - ADULT  Goal: Skin Integrity remains intact(Skin Breakdown Prevention)  Description: Assess:  -Perform Mik assessment every shift  -Clean and moisturize skin every shift  -Inspect skin when repositioning, toileting, and assisting with ADLS  -Assess under medical devices such as wires every 2-4 hr  -Assess extremities for adequate circulation and sensation     Bed Management:  -Have minimal linens on bed & keep smooth, unwrinkled  -Change linens as needed when moist or perspiring  -Avoid sitting or lying in one position for more than 2 hours while in bed  -Keep HOB at 45 degrees     Toileting:  -Offer bedside commode  -Assess for incontinence every 2 hr  -Use incontinent care products after each incontinent episode such as foam cleansers    Activity:  -Mobilize patient 3 times a day  -Encourage activity and walks on unit  -Encourage or provide ROM exercises   -Turn and reposition patient every 2 Hours  -Use appropriate equipment to lift or move patient in bed  -Instruct/ Assist with weight shifting every 60 min when out of bed in chair  -Consider limitation of chair time 2 hour intervals    Skin Care:  -Avoid use of baby powder, tape, friction and shearing, hot water or constrictive clothing  -Relieve pressure over bony prominences using waffle cushion, allyven  -Do not massage red bony areas    Next Steps:  -Teach patient strategies to minimize risks such as weight shifting   -Consider consults to  interdisciplinary teams such as wound care  Outcome: Progressing  Goal: Incision(s), wounds(s) or drain site(s) healing without S/S of infection  Description: INTERVENTIONS  - Assess and document dressing, incision, wound bed, drain sites and surrounding tissue  - Provide patient and family education  - Perform skin care/dressing changes every day or as needed prn  Outcome: Progressing  Goal: Pressure injury heals and does not worsen  Description: Interventions:  - Implement low air loss mattress or specialty surface (Criteria met)  - Apply silicone foam dressing  - Instruct/assist with weight shifting every 60  minutes when in chair   - Limit chair time to 2 hour intervals  - Use special pressure reducing interventions such as waffle cushion when in chair   - Apply fecal or urinary incontinence containment device   - Perform passive or active ROM every 6 hrs  - Turn and reposition patient & offload bony prominences every 2 hours   - Utilize friction reducing device or surface for transfers   - Consider consults to  interdisciplinary teams such as wound care  - Use incontinent care products after each incontinent episode such as foam cleanser  - Consider nutrition services referral as needed  Outcome: Progressing

## 2023-12-02 NOTE — ASSESSMENT & PLAN NOTE
Paroxysmal atrial fibrillation status post ablation and pacemaker  Continue metoprolol.   Anticoagulation: Holding Xxrelto until coagulopathy improves

## 2023-12-02 NOTE — ASSESSMENT & PLAN NOTE
Chronic anticoagulation with coagulopathy: Elevated PT and PTT. Relation to xarelto? ED discussed with hematology with recommendation to order a mixing study  Does have ecchymosis of right leg from wrapping but no other concern for bleeding.

## 2023-12-02 NOTE — ASSESSMENT & PLAN NOTE
Lab Results   Component Value Date    EGFR 53 12/02/2023    EGFR 49 09/08/2023    EGFR 47 09/07/2023    CREATININE 1.00 12/02/2023    CREATININE 1.06 09/08/2023    CREATININE 1.10 09/07/2023     Renal function remained stable.

## 2023-12-02 NOTE — ED NOTES
Pt requesting for Nghia BETANCOURT to contact [facility] with care plan update.  Nghia BETANCOURT made aware and will reach out [facility] per pt request.      Min Najera RN  12/02/23 [id number]

## 2023-12-02 NOTE — ED PROVIDER NOTES
History  Chief Complaint   Patient presents with    Wound Check     Patient who has been going to wound center for past three months, wears compression stockings to both legs, but last night began with redness to right knee and right below knee,took stocking off,but redness persists     Patient is an 82-year-old female with past medical history significant for A-fib anticoagulated with Xarelto, CHF, GERD, arthritis, who has been under the care of wound management for lower extremity wounds since banging her leg with a car door and developing a septic hematoma requiring hospitalization and IV antibiotics 4 months ago, who presents for evaluation of right lower extremity erythema, ecchymosis and swelling since last night. Patient denies trauma or injury. She denies shortness of breath, chest pain, fever, chills, nausea, vomiting or other constitutional symptoms          Prior to Admission Medications   Prescriptions Last Dose Informant Patient Reported? Taking?    Calcium Acetate, Phos Binder, (CALCIUM ACETATE PO) 12/1/2023 Self Yes Yes   Sig: Take by mouth daily     ascorbic acid (VITAMIN C) 500 MG tablet Past Week Self Yes Yes   Sig: Take 500 mg by mouth 2 (two) times a day   clobetasol (TEMOVATE) 0.05 % ointment Past Week Self No Yes   Sig: Apply once weekly to the affected area   famotidine (PEPCID) 40 MG tablet 12/1/2023 Self No Yes   Sig: TAKE 1 TABLET BY MOUTH EVERY DAY   fluticasone (FLONASE) 50 mcg/act nasal spray 12/1/2023 Self No Yes   Sig: SPRAY 1 SPRAY INTO EACH NOSTRIL EVERY DAY   gabapentin (NEURONTIN) 800 mg tablet 12/2/2023  No Yes   Sig: TAKE 1 TABLET BY MOUTH FOUR TIMES A DAY   loratadine (CLARITIN) 10 mg tablet Past Week Self Yes Yes   Sig: Take 10 mg by mouth daily   magnesium 30 MG tablet 12/1/2023 Self Yes Yes   Sig: Take 30 mg by mouth in the morning   metoprolol tartrate (LOPRESSOR) 50 mg tablet 12/2/2023 Self No Yes   Sig: TAKE 1 AND 1/2 TABLETS BY MOUTH 2 TIMES A DAY   multivitamin [facility]) TABS 12/2/2023 Self Yes Yes   Sig: Take 1 tablet by mouth daily   pantoprazole (PROTONIX) 20 mg tablet Unknown Self Yes No   Sig: Take 20 mg by mouth daily   pramipexole (MIRAPEX) 0.5 mg tablet 12/1/2023 Self No Yes   Sig: TAKE 2 FULL TABLETS TWICE A DAY AT 5:00 P. M. AND 10:00 P. M.   rivaroxaban (Xarelto) 20 mg tablet 12/2/2023 Self No Yes   Sig: Take 1 tablet (20 mg total) by mouth daily with breakfast   torsemide (DEMADEX) 20 mg tablet 12/2/2023  No Yes   Sig: TAKE 1 TABLET BY MOUTH EVERY DAY      Facility-Administered Medications: None       Past Medical History:   Diagnosis Date    Arthritis     Atrial fibrillation ([address])     Carrero's esophagus     last assessed: 1/23/2018    BRCA1 negative     BRCA2 negative     Breast cancer ([address]) 2006    stage 1 (left), given adjuvant radiation with Arimidex x 5 years    Cancer ([address]) 2006    Left Breast, Lumpectomy    Cataract     last assessed: 3/11/2014    Chronic diastolic CHF (congestive heart failure) ([address]) 11/21/2022    Dysplasia of toenail     last assessed: 8/29/2017    Esophageal reflux     GERD (gastroesophageal reflux disease)     Gross hematuria     last assessed: 2/19/2015    Hematuria     Hiatal hernia     History of radiation therapy     Hypertension     Irregular heart beat     AFIB    Mixed sensory-motor polyneuropathy     Neuropathy     Obesity     Pacemaker     Paroxysmal atrial fibrillation (HCC)     Peripheral neuropathy     Rectal bleeding     Restless leg syndrome     Shortness of breath     last assessed: 1/11/2016       Past Surgical History:   Procedure Laterality Date    BREAST BIOPSY Left 2006    BREAST LUMPECTOMY Left 2006    onset: 2006    BREAST SURGERY      CARDIAC CATHETERIZATION N/A 9/5/2023    Procedure: Cardiac RHC/LHC; Surgeon: Terra Pacheco MD;  [facility]: [facility];   Service: Cardiology    CARDIAC CATHETERIZATION N/A 9/5/2023    Procedure: Cardiac Coronary Angiogram;  Surgeon: Terra Pacheco MD;  [facility]: [facility]; Service: Cardiology    CARDIAC CATHETERIZATION  9/5/2023    Procedure: Cardiac catheterization;  Surgeon: Pao Patel MD;  [facility]: [facility]; Service: Cardiology    CARDIAC PACEMAKER PLACEMENT      x 3 2006    CATARACT EXTRACTION      COLONOSCOPY      CYSTOSCOPY  04/04/2014    diagnostic    HYSTERECTOMY      ALISON BSO; due to fibroid uterus; age 38    KNEE CARTILAGE SURGERY      excision lesion of meniscus or capsule knee    KNEE SURGERY      OOPHORECTOMY Bilateral     age 38    OTHER SURGICAL HISTORY      radiation therapy    CT ARTHRP KNE CONDYLE&PLATU MEDIAL&LAT COMPARTMENTS Left 08/17/2020    Procedure: ARTHROPLASTY KNEE TOTAL;  Surgeon: Alexandrea Stark DO;  Location: [facility];  Service: Orthopedics    CT ARTHRP KNE CONDYLE&PLATU MEDIAL&LAT COMPARTMENTS Right 03/28/2022    Procedure: ARTHROPLASTY KNEE TOTAL W ROBOT - RIGHT;  Surgeon: Alexandrea Stark DO;  Location: [facility];  Service: Orthopedics    CT COLONOSCOPY FLX DX W/COLLJ SPEC WHEN PFRMD N/A 02/08/2017    Procedure: COLONOSCOPY;  Surgeon: Mike Santillan MD;  Location: [facility]; Service: Gastroenterology    CT ESOPHAGOGASTRODUODENOSCOPY TRANSORAL DIAGNOSTIC N/A 09/20/2017    Procedure: ESOPHAGOGASTRODUODENOSCOPY (EGD); Surgeon: Levar Jorge MD;  Location: [facility];   Service: Gastroenterology    UPPER GASTROINTESTINAL ENDOSCOPY         Family History   Problem Relation Age of Onset    Hypertension Mother     Heart disease Father     Aneurysm Father     Coronary artery disease Father         in his 76s with aneurysm    Aortic aneurysm Father         abdominal    Scleroderma Sister     Breast cancer Sister 76    Hypertension Sister     Cancer Sister     No Known Problems Son     No Known Problems Son     Testicular cancer Son 39    Thyroid cancer Son 45    Colon cancer Maternal Aunt     Colon cancer Maternal Aunt     Breast cancer Other 48        kaylee's daughter    Alcohol abuse Neg Hx     Substance Abuse Neg Hx     Mental illness Neg Hx     Depression Neg Hx      I have reviewed and agree with the history as documented. E-Cigarette/Vaping    E-Cigarette Use Never User      E-Cigarette/Vaping Substances    Nicotine No     THC No     CBD No     Flavoring No     Other No     Unknown No      Social History     Tobacco Use    Smoking status: Former     Packs/day: 1.00     Years: 25.00     Total pack years: 25.00     Types: Cigarettes     Quit date: 1980     Years since quittin.2    Smokeless tobacco: Never    Tobacco comments:     Quit over 30 years ago; quit age 41   Vaping Use    Vaping Use: Never used   Substance Use Topics    Alcohol use: Not Currently    Drug use: No       Review of Systems   Constitutional: Negative. Negative for chills and fever. HENT: Negative. Negative for ear pain and sore throat. Eyes: Negative. Negative for pain and visual disturbance. Respiratory: Negative. Negative for cough and shortness of breath. Cardiovascular:  Positive for leg swelling. Negative for chest pain and palpitations. Gastrointestinal: Negative. Negative for abdominal distention, abdominal pain and vomiting. Endocrine: Negative. Genitourinary: Negative. Negative for dysuria and hematuria. Musculoskeletal:  Negative for arthralgias and back pain. Skin:  Positive for color change and wound. Negative for pallor and rash. Allergic/Immunologic: Negative. Neurological: Negative. Negative for dizziness, seizures, syncope and light-headedness. Psychiatric/Behavioral: Negative. All other systems reviewed and are negative. Physical Exam  Physical Exam  Vitals and nursing note reviewed. Constitutional:       General: She is not in acute distress. Appearance: She is well-developed. HENT:      Head: Normocephalic and atraumatic. Eyes:      Conjunctiva/sclera: Conjunctivae normal.   Cardiovascular:      Rate and Rhythm: Normal rate and regular rhythm. Heart sounds: No murmur heard.   Pulmonary: Effort: Pulmonary effort is normal. No respiratory distress. Breath sounds: Normal breath sounds. Abdominal:      Palpations: Abdomen is soft. Tenderness: There is no abdominal tenderness. Musculoskeletal:         General: Swelling and tenderness present. No signs of injury. Cervical back: Neck supple. Right lower leg: Edema present. Left lower leg: Edema present. Skin:     General: Skin is warm and dry. Capillary Refill: Capillary refill takes less than 2 seconds. Neurological:      Mental Status: She is alert. Psychiatric:         Mood and Affect: Mood normal.          Media Information      Document Information    Clinical Image - Mobile Device      12/02/2023 09:57   Attached To:    Hospital Encounter on 12/2/23   Source Information    [facility]     Vital Signs  ED Triage Vitals   Temperature Pulse Respirations Blood Pressure SpO2   12/02/23 0922 12/02/23 0922 12/02/23 0922 12/02/23 0922 12/02/23 0922   97.7 °F (36.5 °C) 95 20 142/69 94 %      Temp Source Heart Rate Source Patient Position - Orthostatic VS BP Location FiO2 (%)   12/02/23 0922 12/02/23 0922 12/02/23 0922 12/02/23 0922 --   Oral Monitor Lying Right arm       Pain Score       12/02/23 1430       No Pain           Vitals:    12/02/23 2150 12/03/23 0746 12/03/23 0934 12/03/23 1537   BP: 136/67 104/60 126/80 112/63   Pulse: 97 70 92 87   Patient Position - Orthostatic VS:             Visual Acuity      ED Medications  Medications   influenza vaccine, high-dose (FLUZONE HIGH-DOSE) IM injection JORGITO 0.7 mL (has no administration in time range)   vancomycin (VANCOCIN) 1500 mg in sodium chloride 0.9% 250 mL IVPB (1,500 mg Intravenous New Bag 12/3/23 1149)   ascorbic acid (VITAMIN C) tablet 500 mg (500 mg Oral Given 12/3/23 0931)   famotidine (PEPCID) tablet 20 mg (20 mg Oral Given 12/3/23 0932)   fluticasone (FLONASE) 50 mcg/act nasal spray 1 spray (1 spray Each Nare Given 12/3/23 1785)   loratadine (CLARITIN) tablet 10 mg (10 mg Oral Given 12/3/23 0932)   gabapentin (NEURONTIN) capsule 800 mg (800 mg Oral Given 12/3/23 1306)   metoprolol tartrate (LOPRESSOR) tablet 75 mg (75 mg Oral Given 12/3/23 0934)   pramipexole (MIRAPEX) tablet 0.5 mg (0.5 mg Oral Given 12/2/23 2144)   pantoprazole (PROTONIX) EC tablet 20 mg (20 mg Oral Given 12/3/23 0932)   torsemide (DEMADEX) tablet 20 mg (20 mg Oral Given 12/3/23 0934)   acetaminophen (TYLENOL) tablet 650 mg (650 mg Oral Given 12/3/23 0451)   ondansetron (ZOFRAN) injection 4 mg (has no administration in time range)   magnesium Oxide (MAG-OX) tablet 400 mg (400 mg Oral Given 12/3/23 0932)   vancomycin (VANCOCIN) 1,750 mg in sodium chloride 0.9 % 500 mL IVPB (1,750 mg Intravenous New Bag 12/2/23 1126)   aztreonam (AZACTAM) 2,000 mg in sodium chloride 0.9 % 100 mL IVPB (0 mg Intravenous Stopped 12/2/23 1126)       Diagnostic Studies  Results Reviewed       Procedure Component Value Units Date/Time    Blood culture #1 [[id number]] Collected: 12/02/23 1017    Lab Status: Preliminary result Specimen: Blood from Arm, Right Updated: 12/03/23 1601     Blood Culture No Growth at 24 hrs. Blood culture #2 [[id number]] Collected: 12/02/23 1003    Lab Status: Preliminary result Specimen: Blood from Arm, Right Updated: 12/03/23 1601     Blood Culture No Growth at 24 hrs.     Urine culture [[id number]] Collected: 12/02/23 1122    Lab Status: Final result Specimen: Urine, Clean Catch Updated: 12/03/23 1246     Urine Culture 20,000-29,000 cfu/ml    Equal mix, PT / INR [[id number]]  (Abnormal) Collected: 12/02/23 1126    Lab Status: Final result Specimen: Blood from Arm, Right Updated: 12/02/23 2104     PT Mixing Study Room Temp 24.4 Seconds      PT Mixing Study Incubated 25.4 SEC      Protime 53.5 seconds     Equal mix, APTT [[id number]]  (Abnormal) Collected: 12/02/23 1126    Lab Status: Final result Specimen: Blood from Arm, Right Updated: 12/02/23 1947     PTT Mixing Study Room Temp --     PTT Mixing Study Incubated --     PTT 68 seconds     APTT [[id number]]  (Abnormal) Collected: 12/02/23 1126    Lab Status: Final result Specimen: Blood from Arm, Right Updated: 12/02/23 1226     PTT 68 seconds     Protime-INR [[id number]]  (Abnormal) Collected: 12/02/23 1126    Lab Status: Final result Specimen: Blood from Arm, Right Updated: 12/02/23 1226     Protime 53.5 seconds      INR 6.05    Urine Microscopic [[id number]]  (Abnormal) Collected: 12/02/23 1122    Lab Status: Final result Specimen: Urine, Clean Catch Updated: 12/02/23 1219     RBC, UA 2-4 /hpf      WBC, UA 10-20 /hpf      Epithelial Cells Occasional /hpf      Bacteria, UA None Seen /hpf     UA w Reflex to Microscopic w Reflex to Culture [[id number]]  (Abnormal) Collected: 12/02/23 1122    Lab Status: Final result Specimen: Urine, Clean Catch Updated: 12/02/23 1205     Color, UA Yellow     Clarity, UA Clear     Specific Gravity, UA 1.010     pH, UA 7.5     Leukocytes, UA Small     Nitrite, UA Negative     Protein, UA Trace mg/dl      Glucose, UA Negative mg/dl      Ketones, UA Negative mg/dl      Urobilinogen, UA 2.0 E.U./dl      Bilirubin, UA Negative     Occult Blood, UA Negative    FLU/RSV/COVID - if FLU/RSV clinically relevant [[id number]]  (Normal) Collected: 12/02/23 1003    Lab Status: Final result Specimen: Nares from Nose Updated: 12/02/23 1059     SARS-CoV-2 Negative     INFLUENZA A PCR Negative     INFLUENZA B PCR Negative     RSV PCR Negative    Narrative:      FOR PEDIATRIC PATIENTS - copy/paste COVID Guidelines URL to browser: [email or web address]    SARS-CoV-2 assay is a Nucleic Acid Amplification assay intended for the  qualitative detection of nucleic acid from SARS-CoV-2 in nasopharyngeal  swabs. Results are for the presumptive identification of SARS-CoV-2 RNA.     Positive results are indicative of infection with SARS-CoV-2, the virus  causing COVID-19, but do not rule out bacterial infection or co-infection  with other viruses. Laboratories within the [facility] and its  territories are required to report all positive results to the appropriate  public health authorities. Negative results do not preclude SARS-CoV-2  infection and should not be used as the sole basis for treatment or other  patient management decisions. Negative results must be combined with  clinical observations, patient history, and epidemiological information. This test has not been FDA cleared or approved. This test has been authorized by FDA under an Emergency Use Authorization  (EUA). This test is only authorized for the duration of time the  declaration that circumstances exist justifying the authorization of the  emergency use of an in vitro diagnostic tests for detection of SARS-CoV-2  virus and/or diagnosis of COVID-19 infection under section 564(b)(1) of  the Act, 21 U. S.C. 927GKH-5(W)(0), unless the authorization is terminated  or revoked sooner. The test has been validated but independent review by FDA  and CLIA is pending. Test performed using [employer] GeneXpert: This RT-PCR assay targets N2,  a region unique to SARS-CoV-2. A conserved region in the E-gene was chosen  for pan-Sarbecovirus detection which includes SARS-CoV-2. According to CMS-2020-01-R, this platform meets the definition of high-throughput technology.     Procalcitonin [[id number]]  (Normal) Collected: 12/02/23 1003    Lab Status: Final result Specimen: Blood from Arm, Right Updated: 12/02/23 1044     Procalcitonin 0.07 ng/ml     Protime-INR [[id number]]  (Abnormal) Collected: 12/02/23 1003    Lab Status: Final result Specimen: Blood from Arm, Right Updated: 12/02/23 1036     Protime 51.1 seconds      INR 5.70    APTT [[id number]]  (Abnormal) Collected: 12/02/23 1003    Lab Status: Final result Specimen: Blood from Arm, Right Updated: 12/02/23 1036     PTT 70 seconds     Comprehensive metabolic panel [[id number]] (Abnormal) Collected: 12/02/23 1003    Lab Status: Final result Specimen: Blood from Arm, Right Updated: 12/02/23 1033     Sodium 140 mmol/L      Potassium 4.0 mmol/L      Chloride 101 mmol/L      CO2 32 mmol/L      ANION GAP 7 mmol/L      BUN 27 mg/dL      Creatinine 1.00 mg/dL      Glucose 86 mg/dL      Calcium 9.7 mg/dL      AST 24 U/L      ALT 18 U/L      Alkaline Phosphatase 74 U/L      Total Protein 7.8 g/dL      Albumin 4.2 g/dL      Total Bilirubin 1.01 mg/dL      eGFR 53 ml/min/1.73sq m     Narrative:      [facility] (CKD):     Stage 1 with normal or high GFR (GFR > 90 mL/min/1.73 square meters)    Stage 2 Mild CKD (GFR = 60-89 mL/min/1.73 square meters)    Stage 3A Moderate CKD (GFR = 45-59 mL/min/1.73 square meters)    Stage 3B Moderate CKD (GFR = 30-44 mL/min/1.73 square meters)    Stage 4 Severe CKD (GFR = 15-29 mL/min/1.73 square meters)    Stage 5 End Stage CKD (GFR <15 mL/min/1.73 square meters)  Note: GFR calculation is accurate only with a steady state creatinine    Magnesium [[id number]]  (Normal) Collected: 12/02/23 1003    Lab Status: Final result Specimen: Blood from Arm, Right Updated: 12/02/23 1033     Magnesium 2.4 mg/dL     Lactic acid [[id number]]  (Normal) Collected: 12/02/23 1003    Lab Status: Final result Specimen: Blood from Arm, Right Updated: 12/02/23 1032     LACTIC ACID 0.9 mmol/L     Narrative:      Result may be elevated if tourniquet was used during collection.     CBC and differential [[id number]]  (Abnormal) Collected: 12/02/23 1003    Lab Status: Final result Specimen: Blood from Arm, Right Updated: 12/02/23 1012     WBC 9.84 Thousand/uL      RBC 4.23 Million/uL      Hemoglobin 13.0 g/dL      Hematocrit 40.6 %      MCV 96 fL      MCH 30.7 pg      MCHC 32.0 g/dL      RDW 14.2 %      MPV 10.7 fL      Platelets 986 Thousands/uL      nRBC 0 /100 WBCs      Neutrophils Relative 76 %      Immat GRANS % 0 %      Lymphocytes Relative 15 %      Monocytes Relative 8 %      Eosinophils Relative 1 %      Basophils Relative 0 %      Neutrophils Absolute 7.50 Thousands/µL      Immature Grans Absolute 0.03 Thousand/uL      Lymphocytes Absolute 1.46 Thousands/µL      Monocytes Absolute 0.76 Thousand/µL      Eosinophils Absolute 0.06 Thousand/µL      Basophils Absolute 0.03 Thousands/µL                    VAS lower limb venous duplex study, unilateral/limited   Final Result by Leena Resendiz DO (12/02 2026)                 Procedures  ECG 12 Lead Documentation Only    Date/Time: 12/2/2023 10:04 AM    Performed by: Erinn Hensley PA-C  Authorized by: Erinn Hensley PA-C    ECG reviewed by me, the ED Provider: yes    Patient location:  ED  Interpretation:     Interpretation: abnormal    Rate:     ECG rate:  79    ECG rate assessment: normal    Rhythm:     Rhythm: atrial fibrillation    Ectopy:     Ectopy: none    ST segments:     ST segments:  Non-specific  Other findings:     Other findings: prolonged qTc interval             ED Course  ED Course as of 12/03/23 1649   Sat Dec 02, 2023   1022 WBC: 9.84   1042 POCT INR(!!): 5.70   1044 POCT INR(!!): 5.70   1149 POCT INR(!!): 5.70   1150 PROTIME(!): 51.1   1150 POCT INR(!!): 5.70   1150 Magnesium: 2.4   1150 Neutrophils %(!): 76   1150 POCT INR(!!): 5.70   1308 POCT INR(!!): 6.05                               SBIRT 22yo+      Flowsheet Row Most Recent Value   Initial Alcohol Screen: US AUDIT-C     1. How often do you have a drink containing alcohol? 0 Filed at: 12/02/2023 0924   2. How many drinks containing alcohol do you have on a typical day you are drinking? 0 Filed at: 12/02/2023 0924   3a. Male UNDER 65: How often do you have five or more drinks on one occasion? 0 Filed at: 12/02/2023 0924   3b. FEMALE Any Age, or MALE 65+: How often do you have 4 or more drinks on one occassion?  0 Filed at: 12/02/2023 0924   Audit-C Score 0 Filed at: 12/02/2023 4764   KIM: How many times in the past year have you. .. Used an illegal drug or used a prescription medication for non-medical reasons? Never Filed at: 12/02/2023 7498                      Medical Decision Making  Patient with cellulitis of right lower extremity  Patient treated with broad-spectrum antibiotics in the ED  Labs are not concerning for sepsis at this time  Laboratory evaluation did reveal significantly elevated INR  Discussed with hematology who recommended mixing study and admission to hospital for further evaluation and monitoring    Problems Addressed:  Cellulitis of right lower extremity: acute illness or injury  Elevated INR: acute illness or injury    Amount and/or Complexity of Data Reviewed  Labs: ordered. Decision-making details documented in ED Course. ECG/medicine tests: ordered and independent interpretation performed. Decision-making details documented in ED Course. Discussion of management or test interpretation with external provider(s): This with Dr. Dilcia Logan of hematology who recommended mixing study and admission the hospital    Risk  Decision regarding hospitalization. Disposition  Final diagnoses:   Cellulitis of right lower extremity   Elevated INR     Time reflects when diagnosis was documented in both MDM as applicable and the Disposition within this note       Time User Action Codes Description Comment    12/2/2023 12:40 PM Kristofer Mariee Add [L80.853] Cellulitis of right lower extremity     12/2/2023 12:40 PM Kristofer Nieto [R79.1] Elevated INR           ED Disposition       ED Disposition   Admit    Condition   Stable    Date/Time   Sat Dec 2, 2023 1240    Comment   Case was discussed with FALGUNI and the patient's admission status was agreed to be Admission Status: inpatient status to the service of Dr. Michael Stoner .                Follow-up Information    None         Current Discharge Medication List        CONTINUE these medications which have NOT CHANGED    Details ascorbic acid (VITAMIN C) 500 MG tablet Take 500 mg by mouth 2 (two) times a day      Calcium Acetate, Phos Binder, (CALCIUM ACETATE PO) Take by mouth daily        clobetasol (TEMOVATE) 0.05 % ointment Apply once weekly to the affected area  Qty: 30 g, Refills: 3    Associated Diagnoses: Lichen sclerosus et atrophicus of the vulva      famotidine (PEPCID) 40 MG tablet TAKE 1 TABLET BY MOUTH EVERY DAY  Qty: 90 tablet, Refills: 1    Associated Diagnoses: Gastroesophageal reflux disease      fluticasone (FLONASE) 50 mcg/act nasal spray SPRAY 1 SPRAY INTO EACH NOSTRIL EVERY DAY  Qty: 48 mL, Refills: 3    Associated Diagnoses: Seasonal allergies      gabapentin (NEURONTIN) 800 mg tablet TAKE 1 TABLET BY MOUTH FOUR TIMES A DAY  Qty: 360 tablet, Refills: 1    Associated Diagnoses: Large fiber neuropathy; Sensory polyneuropathy      loratadine (CLARITIN) 10 mg tablet Take 10 mg by mouth daily      magnesium 30 MG tablet Take 30 mg by mouth in the morning      metoprolol tartrate (LOPRESSOR) 50 mg tablet TAKE 1 AND 1/2 TABLETS BY MOUTH 2 TIMES A DAY  Qty: 270 tablet, Refills: 3    Associated Diagnoses: Chronic atrial fibrillation (HCC)      multivitamin (THERAGRAN) TABS Take 1 tablet by mouth daily      pramipexole (MIRAPEX) 0.5 mg tablet TAKE 2 FULL TABLETS TWICE A DAY AT 5:00 P. M. AND 10:00 P.M.  Marvin Cousins: 360 tablet, Refills: 1    Associated Diagnoses: RLS (restless legs syndrome)      rivaroxaban (Xarelto) 20 mg tablet Take 1 tablet (20 mg total) by mouth daily with breakfast  Qty: 90 tablet, Refills: 3    Associated Diagnoses: Chronic atrial fibrillation (HCC)      torsemide (DEMADEX) 20 mg tablet TAKE 1 TABLET BY MOUTH EVERY DAY  Qty: 90 tablet, Refills: 3    Associated Diagnoses: Chronic diastolic heart failure (HCC)      pantoprazole (PROTONIX) 20 mg tablet Take 20 mg by mouth daily             No discharge procedures on file.     PDMP Review         Value Time User    PDMP Reviewed  Yes 4/22/2022 10:26 AM Camelia Cruz Mal Koyanagi, PA-C            ED Provider  Electronically Signed by             Lisbeth Ordonez PA-C  12/03/23 7706

## 2023-12-02 NOTE — ASSESSMENT & PLAN NOTE
History of aortic stenosis paroxysmal atrial fibrillation diastolic CHF restless leg and polyneuropathy who has been battling lower extremity wounds since hitting legs on car door presents back to the hospital for persistent and worsening erythema of right leg surrounding the knee  Denies any new injuries. Multiple drug allergies. Given vancomycin and amikacin in the ED. Reviewed previous wound cultures with CNS/staph epi. Does not appear to have evidence of joint infection with good range of motion and minimal effusion  Continue monotherapy vancomycin. Monitor leg clinically. Wound care consulted.

## 2023-12-02 NOTE — H&P
[address]  H&P  Name: Theresia Spatz 82 y.o. female I MRN: [id number]  Unit/Bed#: 913 [facility] 422-01 I Date of Admission: 12/2/2023   Date of Service: 12/2/2023 I Hospital Day: 0      Assessment/Plan   * Cellulitis of right leg  Assessment & Plan  History of aortic stenosis paroxysmal atrial fibrillation diastolic CHF restless leg and polyneuropathy who has been battling lower extremity wounds since hitting legs on car door presents back to the hospital for persistent and worsening erythema of right leg surrounding the knee  Denies any new injuries. Multiple drug allergies. Given vancomycin and amikacin in the ED. Reviewed previous wound cultures with CNS/staph epi. Does not appear to have evidence of joint infection with good range of motion and minimal effusion  Continue monotherapy vancomycin. Monitor leg clinically. Wound care consulted. Chronic diastolic CHF (congestive heart failure) (HCC)  Assessment & Plan  Wt Readings from Last 3 Encounters:   12/02/23 101 kg (222 lb)   10/20/23 102 kg (225 lb 8 oz)   10/11/23 103 kg (228 lb)     Compensated. Continue torsemide home dosing. Coagulopathy (HCC)  Assessment & Plan  Chronic anticoagulation with coagulopathy: Elevated PT and PTT. Relation to xarelto? ED discussed with hematology with recommendation to order a mixing study  Does have ecchymosis of right leg from wrapping but no other concern for bleeding. Stage 3a chronic kidney disease [facility])  Assessment & Plan  Lab Results   Component Value Date    EGFR 53 12/02/2023    EGFR 49 09/08/2023    EGFR 47 09/07/2023    CREATININE 1.00 12/02/2023    CREATININE 1.06 09/08/2023    CREATININE 1.10 09/07/2023     Renal function remained stable. RLS (restless legs syndrome)  Assessment & Plan  Continue pramipexole    Sensory polyneuropathy  Assessment & Plan  Polyneuropathy follows with neurology as an outpatient  Confirmed dosing and reviewed on .   Continue gabapentin 800 mg 4 times daily    Atrial fibrillation (HCC) [I48.91]  Assessment & Plan  Paroxysmal atrial fibrillation status post ablation and pacemaker  Continue metoprolol. Anticoagulation: Holding Xxrelto until coagulopathy improves    VTE Pharmacologic Prophylaxis: VTE Score: 3 Moderate Risk (Score 3-4) - Pharmacological DVT Prophylaxis Contraindicated. Sequential Compression Devices Ordered. Code Status: Level 1 - Full Code  Discussion with family:     Anticipated Length of Stay: Patient will be admitted on an inpatient basis with an anticipated length of stay of greater than 2 midnights secondary to coagulopathy and leg cellulitis. Total Time Spent on Date of Encounter in care of patient: This time was spent on one or more of the following: performing physical exam; counseling and coordination of care; obtaining or reviewing history; documenting in the medical record; reviewing/ordering tests, medications or procedures; communicating with other healthcare professionals and discussing with patient's family/caregivers. Chief Complaint:     Wound Check (Patient who has been going to wound center for past three months, wears compression stockings to both legs, but last night began with redness to right knee and right below knee,took stocking off,but redness persists)    History of Present Illness:    Dimitri Mock is a 82 y.o. female with a past medical history of paroxysmal atrial fibrillation neuropathy diastolic CHF who presents with worsening erythema surrounding right knee. The patient follows with wound care closely for her lower extremity wounds after suffering injury involving a car door of her [employer]. She usually wears compression stockings and has home health nursing for her wounds. Unfortunately she had worsening erythema today and she came to the hospital where due to concerns for cellulitis she was started on broad-spectrum antibiotics. She denies any fevers or chills.   No nausea vomiting or diarrhea. She is having a lot of discomfort in her legs. Review of Systems:  Review of Systems   Constitutional:  Negative for chills and fever. HENT:  Negative for facial swelling, sinus pressure and trouble swallowing. Eyes:  Negative for visual disturbance. Respiratory:  Negative for shortness of breath. Cardiovascular:  Negative for chest pain and palpitations. Gastrointestinal:  Negative for abdominal distention, abdominal pain, diarrhea, nausea and vomiting. Genitourinary:  Negative for dysuria, hematuria and urgency. Musculoskeletal:  Positive for arthralgias. Negative for myalgias. Leg pains bilaterally   Skin:  Positive for color change, rash and wound. Both legs but new rash around the right knee   Neurological:  Negative for speech difficulty and numbness. Psychiatric/Behavioral:  The patient is not nervous/anxious. All other systems reviewed and are negative.         Past Medical and Surgical History:   Past Medical History:   Diagnosis Date    Arthritis     Atrial fibrillation ([address])     Carrero's esophagus     last assessed: 1/23/2018    BRCA1 negative     BRCA2 negative     Breast cancer ([address]) 2006    stage 1 (left), given adjuvant radiation with Arimidex x 5 years    Cancer [facility]) 2006    Left Breast, Lumpectomy    Cataract     last assessed: 3/11/2014    Chronic diastolic CHF (congestive heart failure) ([address]) 11/21/2022    Dysplasia of toenail     last assessed: 8/29/2017    Esophageal reflux     GERD (gastroesophageal reflux disease)     Gross hematuria     last assessed: 2/19/2015    Hematuria     Hiatal hernia     History of radiation therapy     Hypertension     Irregular heart beat     AFIB    Mixed sensory-motor polyneuropathy     Neuropathy     Obesity     Pacemaker     Paroxysmal atrial fibrillation (HCC)     Peripheral neuropathy     Rectal bleeding     Restless leg syndrome     Shortness of breath     last assessed: 1/11/2016     Past Surgical History:   Procedure Laterality Date    BREAST BIOPSY Left 2006    BREAST LUMPECTOMY Left 2006    onset: 2006    BREAST SURGERY      CARDIAC CATHETERIZATION N/A 9/5/2023    Procedure: Cardiac RHC/LHC; Surgeon: Heydi Otero MD;  [facility]: [facility]; Service: Cardiology    CARDIAC CATHETERIZATION N/A 9/5/2023    Procedure: Cardiac Coronary Angiogram;  Surgeon: Heydi Otero MD;  [facility]: [facility]; Service: Cardiology    CARDIAC CATHETERIZATION  9/5/2023    Procedure: Cardiac catheterization;  Surgeon: Heydi Otero MD;  [facility]: [facility]; Service: Cardiology    CARDIAC PACEMAKER PLACEMENT      x 3 2006    CATARACT EXTRACTION      COLONOSCOPY      CYSTOSCOPY  04/04/2014    diagnostic    HYSTERECTOMY      ALISON BSO; due to fibroid uterus; age 38    KNEE CARTILAGE SURGERY      excision lesion of meniscus or capsule knee    KNEE SURGERY      OOPHORECTOMY Bilateral     age 38    OTHER SURGICAL HISTORY      radiation therapy    MS ARTHRP KNE CONDYLE&PLATU MEDIAL&LAT COMPARTMENTS Left 08/17/2020    Procedure: ARTHROPLASTY KNEE TOTAL;  Surgeon: Santo Gibbs DO;  Location: [facility];  Service: Orthopedics    MS ARTHRP KNE CONDYLE&PLATU MEDIAL&LAT COMPARTMENTS Right 03/28/2022    Procedure: ARTHROPLASTY KNEE TOTAL W ROBOT - RIGHT;  Surgeon: Santo Gibbs DO;  Location: [facility];  Service: Orthopedics    MS COLONOSCOPY FLX DX W/COLLJ SPEC WHEN PFRMD N/A 02/08/2017    Procedure: COLONOSCOPY;  Surgeon: Britni Palomo MD;  Location:  GI LAB; Service: Gastroenterology    MS ESOPHAGOGASTRODUODENOSCOPY TRANSORAL DIAGNOSTIC N/A 09/20/2017    Procedure: ESOPHAGOGASTRODUODENOSCOPY (EGD); Surgeon: Rj Rob MD;  Location: [facility]; Service: Gastroenterology    UPPER GASTROINTESTINAL ENDOSCOPY       Meds/Allergies: Allergies:    Allergies   Allergen Reactions    Latex      Added based on information entered during case entry, please review and add reactions, type, and severity as needed Cephalexin Rash    Duloxetine Hcl Other (See Comments)     Facial pins and needles sensation    Erythromycin Rash    Levofloxacin Other (See Comments)     Muscular aches    Penicillins Rash    Savella [Milnacipran] Rash    Sulfa Antibiotics Rash     Prior to Admission Medications   Prescriptions Last Dose Informant Patient Reported? Taking? Calcium Acetate, Phos Binder, (CALCIUM ACETATE PO) 12/1/2023 Self Yes Yes   Sig: Take by mouth daily     ascorbic acid (VITAMIN C) 500 MG tablet Past Week Self Yes Yes   Sig: Take 500 mg by mouth 2 (two) times a day   clobetasol (TEMOVATE) 0.05 % ointment Past Week Self No Yes   Sig: Apply once weekly to the affected area   famotidine (PEPCID) 40 MG tablet 12/1/2023 Self No Yes   Sig: TAKE 1 TABLET BY MOUTH EVERY DAY   fluticasone (FLONASE) 50 mcg/act nasal spray 12/1/2023 Self No Yes   Sig: SPRAY 1 SPRAY INTO EACH NOSTRIL EVERY DAY   gabapentin (NEURONTIN) 800 mg tablet 12/2/2023  No Yes   Sig: TAKE 1 TABLET BY MOUTH FOUR TIMES A DAY   loratadine (CLARITIN) 10 mg tablet Past Week Self Yes Yes   Sig: Take 10 mg by mouth daily   magnesium 30 MG tablet 12/1/2023 Self Yes Yes   Sig: Take 30 mg by mouth in the morning   metoprolol tartrate (LOPRESSOR) 50 mg tablet 12/2/2023 Self No Yes   Sig: TAKE 1 AND 1/2 TABLETS BY MOUTH 2 TIMES A DAY   multivitamin (THERAGRAN) TABS 12/2/2023 Self Yes Yes   Sig: Take 1 tablet by mouth daily   pantoprazole (PROTONIX) 20 mg tablet Unknown Self Yes No   Sig: Take 20 mg by mouth daily   pramipexole (MIRAPEX) 0.5 mg tablet 12/1/2023 Self No Yes   Sig: TAKE 2 FULL TABLETS TWICE A DAY AT 5:00 P. M. AND 10:00 P. M.   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      Comment: retired   Tobacco Use    Smoking status: Former     Packs/day: 1.00     Years: 25.00     Total pack years: 25.00     Types: Cigarettes     Quit date: 1980     Years since quittin.2    Smokeless tobacco: Never    Tobacco comments:     Quit over 30 years ago; quit age 41   Vaping Use    Vaping Use: Never used   Substance and Sexual Activity    Alcohol use: Not Currently    Drug use: No    Sexual activity: Not on file   Other Topics Concern    Not on file   Social History Narrative         Social Determinants of Health     Financial Resource Strain: Low Risk  (2022)    Overall Financial Resource Strain (CARDIA)     Difficulty of Paying Living Expenses: Not very hard   Food Insecurity: No Food Insecurity (2023)    Hunger Vital Sign     Worried About Running Out of Food in the Last Year: Never true     [address] in the Last Year: Never true   Transportation Needs: No Transportation Needs (2023)    PRAPARE - Transportation     Lack of Transportation (Medical): No     Lack of Transportation (Non-Medical):  No   Physical Activity: Not on file   Stress: Not on file   Social Connections: Not on file   Intimate Partner Violence: Not on file   Housing Stability: Unknown (2023)    Housing Stability Vital Sign     Unable to Pay for Housing in the Last Year: No     Number of Places Lived in the Last Year: Not on file     Unstable Housing in the Last Year: No     Patient Pre-hospital Living Situation:   Patient Pre-hospital Level of Mobility:   Patient Pre-hospital Diet Restrictions:     Family History:  Family History   Problem Relation Age of Onset    Hypertension Mother     Heart disease Father     Aneurysm Father     Coronary artery disease Father in his 76s with aneurysm    Aortic aneurysm Father         abdominal    Scleroderma Sister     Breast cancer Sister 76    Hypertension Sister     Cancer Sister     No Known Problems Son     No Known Problems Son     Testicular cancer Son 39    Thyroid cancer Son 45    Colon cancer Maternal Aunt     Colon cancer Maternal Aunt     Breast cancer Other 48        kaylee's daughter    Alcohol abuse Neg Hx     Substance Abuse Neg Hx     Mental illness Neg Hx     Depression Neg Hx      Physical Exam:   Vitals:   Blood Pressure: 121/73 (12/02/23 1417)  Pulse: 72 (12/02/23 1417)  Temperature: (!) 97.4 °F (36.3 °C) (12/02/23 1417)  Temp Source: Oral (12/02/23 0922)  Respirations: 18 (12/02/23 1417)  Height: 5' 4" (162.6 cm) (12/02/23 0597)  Weight - Scale: 101 kg (222 lb) (12/02/23 0922)  SpO2: 95 % (12/02/23 1417)    Physical Exam  Vitals reviewed. Constitutional:       General: She is not in acute distress. Appearance: Normal appearance. She is obese. HENT:      Head: Atraumatic. Eyes:      General: No scleral icterus. Extraocular Movements: Extraocular movements intact. Cardiovascular:      Rate and Rhythm: Regular rhythm. Heart sounds: Murmur heard. Pulmonary:      Breath sounds: Decreased breath sounds present. No wheezing. Abdominal:      General: Bowel sounds are normal.      Palpations: Abdomen is soft. Tenderness: There is no guarding or rebound. Musculoskeletal:         General: Swelling and tenderness present. Comments: Around right knee   Skin:     General: Skin is warm. Findings: Erythema present. Comments: Erythema surrounding right knee as pictured  Significant bruising of right lower leg   Neurological:      Mental Status: She is alert and oriented to person, place, and time. Mental status is at baseline. Psychiatric:         Mood and Affect: Mood normal.       Lab Results: I have personally reviewed pertinent reports.     Results from last 7 days   Lab Units 12/02/23  1003   WBC Thousand/uL 9.84   HEMOGLOBIN g/dL 13.0   HEMATOCRIT % 40.6   PLATELETS Thousands/uL 186   NEUTROS PCT % 76*   LYMPHS PCT % 15   MONOS PCT % 8   EOS PCT % 1     Results from last 7 days   Lab Units 12/02/23  1003   SODIUM mmol/L 140   POTASSIUM mmol/L 4.0   CHLORIDE mmol/L 101   CO2 mmol/L 32   ANION GAP mmol/L 7   BUN mg/dL 27*   CREATININE mg/dL 1.00   CALCIUM mg/dL 9.7   ALBUMIN g/dL 4.2   TOTAL BILIRUBIN mg/dL 1.01*   ALK PHOS U/L 74   ALT U/L 18   AST U/L 24   EGFR ml/min/1.73sq m 53   GLUCOSE RANDOM mg/dL 86     Results from last 7 days   Lab Units 12/02/23  1126   INR  6.05*             Results from last 7 days   Lab Units 12/02/23  1003   LACTIC ACID mmol/L 0.9              Results from last 7 days   Lab Units 12/02/23  1122   COLOR UA  Yellow   CLARITY UA  Clear   SPEC GRAV UA  1.010   PH UA  7.5   LEUKOCYTES UA  Small*   NITRITE UA  Negative   GLUCOSE UA mg/dl Negative   KETONES UA mg/dl Negative   BILIRUBIN UA  Negative   BLOOD UA  Negative      Results from last 7 days   Lab Units 12/02/23  1122   RBC UA /hpf 2-4   WBC UA /hpf 10-20*   EPITHELIAL CELLS WET PREP /hpf Occasional   BACTERIA UA /hpf None Seen      Results from last 7 days   Lab Units 12/02/23  1003   SARS-COV-2  Negative   INFLUENZA A PCR  Negative   INFLUENZA B PCR  Negative   RSV PCR  Negative       Lines/Drains  Invasive Devices       Peripheral Intravenous Line  Duration             Peripheral IV 12/02/23 Right Antecubital <1 day                    Imaging: I have personally reviewed pertinent reports. VAS lower limb venous duplex study, unilateral/limited    Result Date: 12/2/2023  CONCLUSION: RIGHT LOWER LIMB No evidence of acute or chronic deep vein thrombosis . No evidence of superficial thrombophlebitis noted. Doppler evaluation shows a normal response to augmentation maneuvers. Popliteal, posterior tibial and anterior tibial arterial Doppler waveforms are biphasic.   LEFT LOWER LIMB LIMITED Evaluation shows no evidence of thrombus in the common femoral vein. Doppler evaluation shows a normal response to augmentation maneuvers. Technical findings were given to Francisca Rodriguez PA-C.      EKG, Pathology, and Other Studies Reviewed on Admission:   EKG  Result Date: 12/02/23  Personally reviewed strips with impression of: Atrial fibrillation 79 bpm    ** Please Note: This note has been constructed using a voice recognition system.  **

## 2023-12-03 LAB
ALBUMIN SERPL BCP-MCNC: 3.6 G/DL (ref 3.5–5)
ALP SERPL-CCNC: 61 U/L (ref 34–104)
ALT SERPL W P-5'-P-CCNC: 12 U/L (ref 7–52)
ANION GAP SERPL CALCULATED.3IONS-SCNC: 6 MMOL/L
APTT PPP: 47 SECONDS (ref 23–37)
AST SERPL W P-5'-P-CCNC: 17 U/L (ref 13–39)
BACTERIA UR CULT: NORMAL
BILIRUB SERPL-MCNC: 1.04 MG/DL (ref 0.2–1)
BUN SERPL-MCNC: 21 MG/DL (ref 5–25)
CALCIUM SERPL-MCNC: 9 MG/DL (ref 8.4–10.2)
CHLORIDE SERPL-SCNC: 104 MMOL/L (ref 96–108)
CO2 SERPL-SCNC: 26 MMOL/L (ref 21–32)
CREAT SERPL-MCNC: 0.86 MG/DL (ref 0.6–1.3)
ERYTHROCYTE [DISTWIDTH] IN BLOOD BY AUTOMATED COUNT: 14.3 % (ref 11.6–15.1)
GFR SERPL CREATININE-BSD FRML MDRD: 64 ML/MIN/1.73SQ M
GLUCOSE SERPL-MCNC: 85 MG/DL (ref 65–140)
HCT VFR BLD AUTO: 37.2 % (ref 34.8–46.1)
HGB BLD-MCNC: 11.5 G/DL (ref 11.5–15.4)
INR PPP: 2.34 (ref 0.84–1.19)
MCH RBC QN AUTO: 30.3 PG (ref 26.8–34.3)
MCHC RBC AUTO-ENTMCNC: 30.9 G/DL (ref 31.4–37.4)
MCV RBC AUTO: 98 FL (ref 82–98)
PLATELET # BLD AUTO: 164 THOUSANDS/UL (ref 149–390)
PMV BLD AUTO: 10.7 FL (ref 8.9–12.7)
POTASSIUM SERPL-SCNC: 4.1 MMOL/L (ref 3.5–5.3)
PROT SERPL-MCNC: 6.7 G/DL (ref 6.4–8.4)
PROTHROMBIN TIME: 25.7 SECONDS (ref 11.6–14.5)
RBC # BLD AUTO: 3.79 MILLION/UL (ref 3.81–5.12)
SODIUM SERPL-SCNC: 136 MMOL/L (ref 135–147)
VANCOMYCIN SERPL-MCNC: 12.9 UG/ML (ref 10–20)
WBC # BLD AUTO: 7.12 THOUSAND/UL (ref 4.31–10.16)

## 2023-12-03 PROCEDURE — 85730 THROMBOPLASTIN TIME PARTIAL: CPT | Performed by: INTERNAL MEDICINE

## 2023-12-03 PROCEDURE — 85610 PROTHROMBIN TIME: CPT | Performed by: INTERNAL MEDICINE

## 2023-12-03 PROCEDURE — G0008 ADMIN INFLUENZA VIRUS VAC: HCPCS | Performed by: INTERNAL MEDICINE

## 2023-12-03 PROCEDURE — 90662 IIV NO PRSV INCREASED AG IM: CPT | Performed by: INTERNAL MEDICINE

## 2023-12-03 PROCEDURE — 99232 SBSQ HOSP IP/OBS MODERATE 35: CPT | Performed by: INTERNAL MEDICINE

## 2023-12-03 PROCEDURE — 80202 ASSAY OF VANCOMYCIN: CPT | Performed by: INTERNAL MEDICINE

## 2023-12-03 PROCEDURE — 80053 COMPREHEN METABOLIC PANEL: CPT | Performed by: INTERNAL MEDICINE

## 2023-12-03 PROCEDURE — 85027 COMPLETE CBC AUTOMATED: CPT | Performed by: INTERNAL MEDICINE

## 2023-12-03 PROCEDURE — 97162 PT EVAL MOD COMPLEX 30 MIN: CPT

## 2023-12-03 RX ADMIN — VANCOMYCIN HYDROCHLORIDE 1500 MG: 10 INJECTION, POWDER, LYOPHILIZED, FOR SOLUTION INTRAVENOUS at 11:49

## 2023-12-03 RX ADMIN — METOPROLOL TARTRATE 75 MG: 50 TABLET, FILM COATED ORAL at 21:38

## 2023-12-03 RX ADMIN — PRAMIPEXOLE DIHYDROCHLORIDE 0.5 MG: 0.5 TABLET ORAL at 17:21

## 2023-12-03 RX ADMIN — LORATADINE 10 MG: 10 TABLET ORAL at 09:32

## 2023-12-03 RX ADMIN — FAMOTIDINE 20 MG: 20 TABLET ORAL at 09:32

## 2023-12-03 RX ADMIN — GABAPENTIN 800 MG: 400 CAPSULE ORAL at 21:38

## 2023-12-03 RX ADMIN — TORSEMIDE 20 MG: 20 TABLET ORAL at 09:34

## 2023-12-03 RX ADMIN — GABAPENTIN 800 MG: 400 CAPSULE ORAL at 13:06

## 2023-12-03 RX ADMIN — OXYCODONE HYDROCHLORIDE AND ACETAMINOPHEN 500 MG: 500 TABLET ORAL at 09:31

## 2023-12-03 RX ADMIN — PRAMIPEXOLE DIHYDROCHLORIDE 0.5 MG: 0.5 TABLET ORAL at 21:38

## 2023-12-03 RX ADMIN — GABAPENTIN 800 MG: 400 CAPSULE ORAL at 18:13

## 2023-12-03 RX ADMIN — INFLUENZA A VIRUS A/VICTORIA/4897/2022 IVR-238 (H1N1) ANTIGEN (FORMALDEHYDE INACTIVATED), INFLUENZA A VIRUS A/DARWIN/9/2021 SAN-010 (H3N2) ANTIGEN (FORMALDEHYDE INACTIVATED), INFLUENZA B VIRUS B/PHUKET/3073/2013 ANTIGEN (FORMALDEHYDE INACTIVATED), AND INFLUENZA B VIRUS B/MICHIGAN/01/2021 ANTIGEN (FORMALDEHYDE INACTIVATED) 0.7 ML: 60; 60; 60; 60 INJECTION, SUSPENSION INTRAMUSCULAR at 21:52

## 2023-12-03 RX ADMIN — Medication 400 MG: at 17:21

## 2023-12-03 RX ADMIN — ACETAMINOPHEN 650 MG: 325 TABLET ORAL at 18:13

## 2023-12-03 RX ADMIN — Medication 400 MG: at 09:32

## 2023-12-03 RX ADMIN — PANTOPRAZOLE SODIUM 20 MG: 20 TABLET, DELAYED RELEASE ORAL at 09:32

## 2023-12-03 RX ADMIN — GABAPENTIN 800 MG: 400 CAPSULE ORAL at 09:31

## 2023-12-03 RX ADMIN — FLUTICASONE PROPIONATE 1 SPRAY: 50 SPRAY, METERED NASAL at 09:34

## 2023-12-03 RX ADMIN — OXYCODONE HYDROCHLORIDE AND ACETAMINOPHEN 500 MG: 500 TABLET ORAL at 17:21

## 2023-12-03 RX ADMIN — ACETAMINOPHEN 650 MG: 325 TABLET ORAL at 04:51

## 2023-12-03 RX ADMIN — METOPROLOL TARTRATE 75 MG: 50 TABLET, FILM COATED ORAL at 09:34

## 2023-12-03 NOTE — ASSESSMENT & PLAN NOTE
History of aortic stenosis paroxysmal atrial fibrillation diastolic CHF restless leg and polyneuropathy who has been battling lower extremity wounds since hitting legs on car door presents back to the hospital for persistent and worsening erythema of right leg surrounding the knee  Denies any new injuries. Multiple drug allergies. Given vancomycin and amikacin in the ED. Reviewed previous wound cultures with CNS/staph epi. Does not appear to have evidence of joint infection with good range of motion and minimal effusion  Continue monotherapy vancomycin. Lightly improved today. Follow-up on urine culture. Will consult ID and wound care.

## 2023-12-03 NOTE — PROGRESS NOTES
[id number] [facility]  Progress Note  Name: Josafat Mejía  MRN: [id number]  Unit/Bed#: 913 [facility] 422-01 I Date of Admission: 12/2/2023   Date of Service: 12/3/2023 I Hospital Day: 1    Assessment/Plan   * Cellulitis of right leg  Assessment & Plan  History of aortic stenosis paroxysmal atrial fibrillation diastolic CHF restless leg and polyneuropathy who has been battling lower extremity wounds since hitting legs on car door presents back to the hospital for persistent and worsening erythema of right leg surrounding the knee  Denies any new injuries. Multiple drug allergies. Given vancomycin and amikacin in the ED. Reviewed previous wound cultures with CNS/staph epi. Does not appear to have evidence of joint infection with good range of motion and minimal effusion  Continue monotherapy vancomycin. Lightly improved today. Follow-up on urine culture. Will consult ID and wound care. Chronic diastolic CHF (congestive heart failure) (HCC)  Assessment & Plan  Wt Readings from Last 3 Encounters:   12/02/23 100 kg (221 lb)   10/20/23 102 kg (225 lb 8 oz)   10/11/23 103 kg (228 lb)     Compensated. Continue torsemide home dosing. Coagulopathy (HCC)  Assessment & Plan  Chronic anticoagulation with coagulopathy: Elevated PT and PTT. Relation to xarelto? ED discussed with hematology with recommendation to order a mixing study  Does have ecchymosis of right leg from wrapping but no other concern for bleeding. Results from last 7 days   Lab Units 12/03/23  0503 12/02/23  1126 12/02/23  1003   INR  2.34* 6.05* 5.70*   PTT seconds 47* 68*  68* 70*       Stage 3a chronic kidney disease (HCC)  Assessment & Plan  Renal function remained stable.     Results from last 7 days   Lab Units 12/03/23  0503 12/02/23  1003   BUN mg/dL 21 27*   CREATININE mg/dL 0.86 1.00   EGFR ml/min/1.73sq m 64 53       GERD (gastroesophageal reflux disease)  Assessment & Plan  Continue pantoprazole and famotidine    RLS (restless legs syndrome)  Assessment & Plan  Continue pramipexole    Sensory polyneuropathy  Assessment & Plan  Polyneuropathy follows with neurology as an outpatient  Confirmed dosing and reviewed on . Continue gabapentin 800 mg 4 times daily    Atrial fibrillation (HCC) [I48.91]  Assessment & Plan  Paroxysmal atrial fibrillation status post ablation and pacemaker  Continue metoprolol. Anticoagulation: Holding xarelto until coagulopathy improves    VTE Pharmacologic Prophylaxis: VTE Score: 3 Moderate Risk (Score 3-4) - Pharmacological DVT Prophylaxis Contraindicated. Sequential Compression Devices Ordered. Mobility:  Basic Mobility Inpatient Raw Score: 19  JH-HLM Goal: 6: Walk 10 steps or more  JH-HLM Achieved: 7: Walk 25 feet or more  HLM Goal achieved. Continue to encourage appropriate mobility. Patient Centered Rounds: I have performed bedside rounds with nursing staff today. Discussions with Specialists or Other Care Team Provider:     Education and Discussions with Family / Patient: Updated  (son) via phone. Time Spent for Care: This time was spent on one or more of the following: performing physical exam; counseling and coordination of care; obtaining or reviewing history; documenting in the medical record; reviewing/ordering tests, medications or procedures; communicating with other healthcare professionals and discussing with patient's family/caregivers. Current Length of Stay: 1 day(s)  Current Patient Status: Inpatient   Certification Statement: The patient will continue to require additional inpatient hospital stay due to IV antibiotics, hematology/ID/wound care evaluations  Discharge Plan: Anticipate discharge in 24-48 hrs to home. Code Status: Level 1 - Full Code      Subjective:   Patient seen and examined. Feeling about the same. Leg is a little bit better. Objective:   Vitals: Blood pressure 126/80, pulse 92, temperature 98 °F (36.7 °C), resp.  rate 18, height 5' 4" (1.626 m), weight 100 kg (221 lb), SpO2 94 %. No intake or output data in the 24 hours ending 12/03/23 1200    Physical Exam  Vitals reviewed. Constitutional:       General: She is not in acute distress. Appearance: Normal appearance. She is obese. HENT:      Head: Atraumatic. Eyes:      General: No scleral icterus. Cardiovascular:      Rate and Rhythm: Regular rhythm. Heart sounds: Murmur heard. Pulmonary:      Breath sounds: Decreased breath sounds present. No wheezing. Abdominal:      General: Bowel sounds are normal.      Palpations: Abdomen is soft. Tenderness: There is no guarding or rebound. Musculoskeletal:         General: No swelling. Skin:     Findings: Bruising (Lower extremities bilaterally) and erythema (Improved surrounding right knee) present. Neurological:      Mental Status: She is alert and oriented to person, place, and time. Mental status is at baseline.    Psychiatric:         Mood and Affect: Mood normal.       Additional Data:   Labs:  Results from last 7 days   Lab Units 12/03/23  0503 12/02/23  1126 12/02/23  1003   WBC Thousand/uL 7.12  --  9.84   HEMOGLOBIN g/dL 11.5  --  13.0   PLATELETS Thousands/uL 164  --  186   MCV fL 98  --  96   INR  2.34* 6.05* 5.70*     Results from last 7 days   Lab Units 12/03/23  0503 12/02/23  1003   SODIUM mmol/L 136 140   POTASSIUM mmol/L 4.1 4.0   CHLORIDE mmol/L 104 101   CO2 mmol/L 26 32   ANION GAP mmol/L 6 7   BUN mg/dL 21 27*   CREATININE mg/dL 0.86 1.00   CALCIUM mg/dL 9.0 9.7   ALBUMIN g/dL 3.6 4.2   TOTAL BILIRUBIN mg/dL 1.04* 1.01*   ALK PHOS U/L 61 74   ALT U/L 12 18   AST U/L 17 24   EGFR ml/min/1.73sq m 64 53   GLUCOSE RANDOM mg/dL 85 86     Results from last 7 days   Lab Units 12/02/23  1003   MAGNESIUM mg/dL 2.4                  Results from last 7 days   Lab Units 12/02/23  1003   LACTIC ACID mmol/L 0.9   PROCALCITONIN ng/ml 0.07                 * I Have Reviewed All Lab Data Listed Above.    Cultures:   Results from last 7 days   Lab Units 12/02/23  1017 12/02/23  1003   BLOOD CULTURE  Received in Microbiology Lab. Culture in Progress. Received in Microbiology Lab. Culture in Progress. INFLUENZA A PCR   --  Negative       Results from last 7 days   Lab Units 12/02/23  1003   SARS-COV-2  Negative   INFLUENZA A PCR  Negative   INFLUENZA B PCR  Negative   RSV PCR  Negative           Lines/Drains:  Invasive Devices       Peripheral Intravenous Line  Duration             Peripheral IV 12/02/23 Right Antecubital 1 day                  Telemetry:      Imaging:  Imaging Reports Reviewed Today Include:   No results found. Scheduled Meds:  Current Facility-Administered Medications   Medication Dose Route Frequency Provider Last Rate    acetaminophen  650 mg Oral Q4H PRN Relda Ban, DO      ascorbic acid  500 mg Oral BID Relda Ban, DO      famotidine  20 mg Oral Daily Corona Castro, DO      fluticasone  1 spray Each Nare Daily Corona Castro, DO      gabapentin  800 mg Oral 4x Daily Corona Castro, DO      influenza vaccine  0.7 mL Intramuscular Prior to discharge Gilbert Pickens, DO      loratadine  10 mg Oral Daily Corona Castro, DO      magnesium Oxide  400 mg Oral BID Corona Castro, DO      metoprolol tartrate  75 mg Oral Q12H [address] Corona Castro, DO      ondansetron  4 mg Intravenous Q4H PRN Javierda Nelia, DO      pantoprazole  20 mg Oral Daily Corona Castro, DO      pramipexole  0.5 mg Oral BID Corona Castro, DO      torsemide  20 mg Oral Daily Corona Castro, DO      vancomycin  15 mg/kg Intravenous Q24H Gilbert Pickens DO         Today, Patient Was Seen By: Gilbert Pickens DO    ** Please Note: Dictation voice to text software may have been used in the creation of this document.  **

## 2023-12-03 NOTE — PLAN OF CARE
Problem: PHYSICAL THERAPY ADULT  Goal: Performs mobility at highest level of function for planned discharge setting. See evaluation for individualized goals. Description: Treatment/Interventions: Functional transfer training, Elevations, LE strengthening/ROM, Endurance training, Therapeutic exercise, Gait training, Equipment eval/education, Bed mobility, Patient/family training  Equipment Recommended:  (pt has a cane)       See flowsheet documentation for full assessment, interventions and recommendations. Note: Prognosis: Good  Problem List: Decreased endurance, Impaired balance, Decreased mobility, Decreased strength  Assessment: Patient is an 82y.o. year old female seen for Physical Therapy evaluation. Patient admitted with Cellulitis of right leg. Comorbidities affecting patient's physical performance include: CHF, afib, sensory polyneuropathy, RLS, obesity. Personal factors affecting patient at time of initial evaluation include: lives in 2 story house, stairs to enter home, and inability to navigate community distances. Prior to admission, patient was independent with functional mobility without assistive device and requiring assist for ADLS. Please find objective findings from Physical Therapy assessment regarding body systems outlined above with impairments and limitations including weakness, impaired balance, gait deviations, decreased activity tolerance, and decreased functional mobility tolerance. The Barthel Index was used as a functional outcome tool presenting with a score of Barthel Index Score: 75 today indicating moderate limitations of functional mobility and ADLS. Patient's clinical presentation is currently evolving as seen in patient's presentation of new onset of impairment of functional mobility, decreased endurance, and new onset of weakness. Pt would benefit from continued Physical Therapy treatment to address deficits as defined above and maximize level of functional mobility.  As demonstrated by objective findings, the assigned level of complexity for this evaluation is moderate. The patient's AM-PAC Basic Mobility Inpatient Short Form Raw Score is 19. A Raw score of greater than 16 suggests the patient may benefit from discharge to home. Rehab Resource Intensity Level, PT: No post-acute rehabilitation needs    See flowsheet documentation for full assessment.

## 2023-12-03 NOTE — PLAN OF CARE
Problem: PAIN - ADULT  Goal: Verbalizes/displays adequate comfort level or baseline comfort level  Description: Interventions:  - Encourage patient to monitor pain and request assistance  - Assess pain using appropriate pain scale  - Administer analgesics based on type and severity of pain and evaluate response  - Implement non-pharmacological measures as appropriate and evaluate response  - Consider cultural and social influences on pain and pain management  - Notify physician/advanced practitioner if interventions unsuccessful or patient reports new pain  Outcome: Progressing     Problem: INFECTION - ADULT  Goal: Absence or prevention of progression during hospitalization  Description: INTERVENTIONS:  - Assess and monitor for signs and symptoms of infection  - Monitor lab/diagnostic results  - Monitor all insertion sites, i.e. indwelling lines, tubes, and drains  - Monitor endotracheal if appropriate and nasal secretions for changes in amount and color  - Gregory appropriate cooling/warming therapies per order  - Administer medications as ordered  - Instruct and encourage patient and family to use good hand hygiene technique  - Identify and instruct in appropriate isolation precautions for identified infection/condition  Outcome: Progressing  Goal: Absence of fever/infection during neutropenic period  Description: INTERVENTIONS:  - Monitor WBC    Outcome: Progressing     Problem: SAFETY ADULT  Goal: Patient will remain free of falls  Description: INTERVENTIONS:  - Educate patient/family on patient safety including physical limitations  - Instruct patient to call for assistance with activity   - Consult OT/PT to assist with strengthening/mobility   - Keep Call bell within reach  - Keep bed low and locked with side rails adjusted as appropriate  - Keep care items and personal belongings within reach  - Initiate and maintain comfort rounds  - Make Fall Risk Sign visible to staff  - Offer Toileting every 2 Hours, in advance of need  - Initiate/Maintain bed alarm  - Obtain necessary fall risk management equipment: yellow socks  - Apply yellow socks and bracelet for high fall risk patients  - Consider moving patient to room near nurses station  Outcome: Progressing  Goal: Maintain or return to baseline ADL function  Description: INTERVENTIONS:  -  Assess patient's ability to carry out ADLs; assess patient's baseline for ADL function and identify physical deficits which impact ability to perform ADLs (bathing, care of mouth/teeth, toileting, grooming, dressing, etc.)  - Assess/evaluate cause of self-care deficits   - Assess range of motion  - Assess patient's mobility; develop plan if impaired  - Assess patient's need for assistive devices and provide as appropriate  - Encourage maximum independence but intervene and supervise when necessary  - Involve family in performance of ADLs  - Assess for home care needs following discharge   - Consider OT consult to assist with ADL evaluation and planning for discharge  - Provide patient education as appropriate  Outcome: Progressing  Goal: Maintains/Returns to pre admission functional level  Description: INTERVENTIONS:  - Perform AM-PAC 6 Click Basic Mobility/ Daily Activity assessment daily.  - Set and communicate daily mobility goal to care team and patient/family/caregiver. - Collaborate with rehabilitation services on mobility goals if consulted  - Perform Range of Motion 2 times a day. - Reposition patient every 2 hours.   - Dangle patient 2 times a day  - Stand patient 2 times a day  - Ambulate patient 2 times a day  - Out of bed to chair 2 times a day   - Out of bed for meals 2 times a day  - Out of bed for toileting  - Record patient progress and toleration of activity level   Outcome: Progressing     Problem: DISCHARGE PLANNING  Goal: Discharge to home or other facility with appropriate resources  Description: INTERVENTIONS:  - Identify barriers to discharge w/patient and caregiver  - Arrange for needed discharge resources and transportation as appropriate  - Identify discharge learning needs (meds, wound care, etc.)  - Arrange for interpretive services to assist at discharge as needed  - Refer to Case Management Department for coordinating discharge planning if the patient needs post-hospital services based on physician/advanced practitioner order or complex needs related to functional status, cognitive ability, or social support system  Outcome: Progressing     Problem: Knowledge Deficit  Goal: Patient/family/caregiver demonstrates understanding of disease process, treatment plan, medications, and discharge instructions  Description: Complete learning assessment and assess knowledge base.   Interventions:  - Provide teaching at level of understanding  - Provide teaching via preferred learning methods  Outcome: Progressing     Problem: SKIN/TISSUE INTEGRITY - ADULT  Goal: Skin Integrity remains intact(Skin Breakdown Prevention)  Description: Assess:  -Perform Mik assessment every shift  -Clean and moisturize skin every shift  -Inspect skin when repositioning, toileting, and assisting with ADLS  -Assess under medical devices such as darien every shift  -Assess extremities for adequate circulation and sensation     Bed Management:  -Have minimal linens on bed & keep smooth, unwrinkled  -Change linens as needed when moist or perspiring  -Avoid sitting or lying in one position for more than 2 hours while in bed  -Keep HOB at 30 degrees     Toileting:  -Offer bedside commode  -Assess for incontinence every shift  -Use incontinent care products after each incontinent episode such as calazime paste    Activity:  -Mobilize patient 2 times a day  -Encourage activity and walks on unit  -Encourage or provide ROM exercises   -Turn and reposition patient every 2 Hours  -Use appropriate equipment to lift or move patient in bed  -Instruct/ Assist with weight shifting every shift when out of bed in chair  -Consider limitation of chair time 2 hour intervals    Skin Care:  -Avoid use of baby powder, tape, friction and shearing, hot water or constrictive clothing  -Relieve pressure over bony prominences using alyvan  -Do not massage red bony areas    Next Steps:  -Teach patient strategies to minimize risks such as repositioning   -Consider consults to  interdisciplinary teams such as PT  Outcome: Progressing  Goal: Incision(s), wounds(s) or drain site(s) healing without S/S of infection  Description: INTERVENTIONS  - Assess and document dressing, incision, wound bed, drain sites and surrounding tissue  - Provide patient and family education  - Perform skin care/dressing changes every shift  Outcome: Progressing  Goal: Pressure injury heals and does not worsen  Description: Interventions:  - Implement low air loss mattress or specialty surface (Criteria met)  - Apply silicone foam dressing  - Instruct/assist with weight shifting every 30 minutes when in chair   - Limit chair time to 2 hour intervals  - Use special pressure reducing interventions such as waffle cushion when in chair   - Apply fecal or urinary incontinence containment device   - Perform passive or active ROM every shift  - Turn and reposition patient & offload bony prominences every 2 hours   - Utilize friction reducing device or surface for transfers   - Consider consults to  interdisciplinary teams such as PT  - Use incontinent care products after each incontinent episode such as calazime paste  - Consider nutrition services referral as needed  Outcome: Progressing

## 2023-12-03 NOTE — ASSESSMENT & PLAN NOTE
Paroxysmal atrial fibrillation status post ablation and pacemaker  Continue metoprolol.   Anticoagulation: Holding xarelto until coagulopathy improves

## 2023-12-03 NOTE — ASSESSMENT & PLAN NOTE
Renal function remained stable.     Results from last 7 days   Lab Units 12/03/23  0503 12/02/23  1003   BUN mg/dL 21 27*   CREATININE mg/dL 0.86 1.00   EGFR ml/min/1.73sq m 64 53

## 2023-12-03 NOTE — ASSESSMENT & PLAN NOTE
Wt Readings from Last 3 Encounters:   12/02/23 100 kg (221 lb)   10/20/23 102 kg (225 lb 8 oz)   10/11/23 103 kg (228 lb)     Compensated. Continue torsemide home dosing.

## 2023-12-03 NOTE — PHYSICAL THERAPY NOTE
PT EVALUATION     12/03/23 1445   PT Last Visit   PT Visit Date 12/03/23   Note Type   Note type Evaluation   Pain Assessment   Pain Assessment Tool 0-10   Pain Score No Pain   Restrictions/Precautions   Other Precautions Fall Risk  (wounds on legs)   Home Living   Type of [facility]  Two level;1/2 bath on main level;Bed/bath upstairs  (2 BREEZY, has been sleeping in an adjustable bed on the first floor. Pt goes upstairs to shower)   Home Equipment Cane   Prior Function   Level of [place] Independent with ADLs; Independent with functional mobility   Lives With Spouse   Receives Help From [facility]   Additional Pertinent History Pt admitted with cellulitis of leg wounds. Pt reports she feels like she is at her baseline level of function. Family/Caregiver Present No   Cognition   Overall Cognitive Status WFL   Arousal/Participation Alert   Orientation Level Oriented X4   Memory Within functional limits   Following Commands Follows all commands and directions without difficulty   Subjective   Subjective 'I have been trying to keep my legs elevated"   RLE Assessment   RLE Assessment   (ROM WFL, MMT 4/5)   LLE Assessment   LLE Assessment   (ROM WFL, MMT 4/5)   Bed Mobility   Supine to Sit 5  Supervision   Sit to Supine 4  Minimal assistance   Additional items LE management   Transfers   Sit to Stand 5  Supervision   Stand to Sit 5  Supervision   Stand pivot 5  Supervision   Ambulation/Elevation   Gait pattern Wide ELEAZAR   Gait Assistance 5  Supervision   Assistive Device [facility]   Distance 150 feet   Balance   Static Sitting Good   Dynamic Sitting Fair +   Static Standing Fair +   Dynamic Standing Fair   Ambulatory Fair +   Activity Tolerance   Activity Tolerance Patient tolerated treatment well   Assessment   Prognosis Good   Problem List Decreased endurance; Impaired balance;Decreased mobility; Decreased strength   Assessment Patient is an 82y.o. year old female seen for Physical Therapy evaluation. Patient admitted with Cellulitis of right leg. Comorbidities affecting patient's physical performance include: CHF, afib, sensory polyneuropathy, RLS, obesity. Personal factors affecting patient at time of initial evaluation include: lives in 2 story house, stairs to enter home, and inability to navigate community distances. Prior to admission, patient was independent with functional mobility without assistive device and requiring assist for ADLS. Please find objective findings from Physical Therapy assessment regarding body systems outlined above with impairments and limitations including weakness, impaired balance, gait deviations, decreased activity tolerance, and decreased functional mobility tolerance. The Barthel Index was used as a functional outcome tool presenting with a score of Barthel Index Score: 75 today indicating moderate limitations of functional mobility and ADLS. Patient's clinical presentation is currently evolving as seen in patient's presentation of new onset of impairment of functional mobility, decreased endurance, and new onset of weakness. Pt would benefit from continued Physical Therapy treatment to address deficits as defined above and maximize level of functional mobility. As demonstrated by objective findings, the assigned level of complexity for this evaluation is moderate. The patient's -[facility] Basic Mobility Inpatient Short Form Raw Score is 19. A Raw score of greater than 16 suggests the patient may benefit from discharge to home.    Goals   Patient Goals "legs healed so I can have my heart surgery"   STG Expiration Date 12/10/23   Short Term Goal #1 independent bed mobility, independent transfers, independent ambulation indoor level surfaces 100 feet with a cane   LTG Expiration Date 12/17/23   Long Term Goal #1 independent up and down 1 flight of steps so pt can get to her second floor bathroom   Plan   Treatment/Interventions Functional transfer training;Elevations;LE strengthening/ROM; Endurance training; Therapeutic exercise;Gait training;Equipment eval/education; Bed mobility; Patient/family training   PT Frequency 3-5x/wk   Discharge Recommendation   Rehab Resource Intensity Level, PT No post-acute rehabilitation needs   Equipment Recommended   (pt has a cane)   AM-PAC Basic Mobility Inpatient   Turning in Flat Bed Without Bedrails 3   Lying on Back to Sitting on Edge of Flat Bed Without Bedrails 3   Moving Bed to Chair 3   Standing Up From Chair Using Arms 4   Walk in Room 3   Climb 3-5 Stairs With Railing 3   Basic Mobility Inpatient Raw Score 19   Basic Mobility Standardized Score 42.48   Highest Level Of Mobility   JH-HLM Goal 6: Walk 10 steps or more   JH-HLM Achieved 7: Walk 25 feet or more   Barthel Index   Feeding 10   Bathing 0   Grooming Score 5   Dressing Score 5   Bladder Score 10   Bowels Score 10   Toilet Use Score 5   Transfers (Bed/Chair) Score 15   Mobility (Level Surface) Score 10   Stairs Score 5   Barthel Index Score 76   Licensure   [place] License Number  Janet KONRAD [id number]

## 2023-12-03 NOTE — PROGRESS NOTES
Salud Ramirez is a 82 y.o. female who is currently ordered Vancomycin IV with management by the Pharmacy Consult service. Relevant clinical data and objective / subjective history reviewed. Vancomycin Assessment:  Indication and Goal AUC/Trough: Soft tissue (goal -600, trough >10), -600, trough >10  Clinical Status:   Micro:   pending  Renal Function:  SCr: 0.86 mg/dL  CrCl: 59.1 mL/min  Renal replacement: Not on dialysis  Days of Therapy: 2  Current Dose: 1500 mg daily  Vancomycin Plan:  Continue 1500 mg daily as planned  Estimated AUC: 495 mcg*hr/mL  Estimated Trough: 13.9 mcg/mL  Next Level: 12/5/23 @0600  Renal Function Monitoring: Daily BMP and [place] will continue to follow closely for s/sx of nephrotoxicity, infusion reactions and appropriateness of therapy. BMP and CBC will be ordered per protocol. We will continue to follow the patient’s culture results and clinical progress daily.     Dari Amador, Pharmacist

## 2023-12-03 NOTE — ASSESSMENT & PLAN NOTE
Chronic anticoagulation with coagulopathy: Elevated PT and PTT. Relation to xarelto? ED discussed with hematology with recommendation to order a mixing study  Does have ecchymosis of right leg from wrapping but no other concern for bleeding.     Results from last 7 days   Lab Units 12/03/23  0503 12/02/23  1126 12/02/23  1003   INR  2.34* 6.05* 5.70*   PTT seconds 47* 68*  68* 70*

## 2023-12-04 ENCOUNTER — TELEPHONE (OUTPATIENT)
Dept: HEMATOLOGY ONCOLOGY | Facility: CLINIC | Age: 83
End: 2023-12-04

## 2023-12-04 VITALS
TEMPERATURE: 97.8 F | BODY MASS INDEX: 37.73 KG/M2 | SYSTOLIC BLOOD PRESSURE: 113 MMHG | WEIGHT: 221 LBS | HEIGHT: 64 IN | DIASTOLIC BLOOD PRESSURE: 71 MMHG | HEART RATE: 87 BPM | RESPIRATION RATE: 18 BRPM | OXYGEN SATURATION: 95 %

## 2023-12-04 LAB
ALBUMIN SERPL BCP-MCNC: 3.6 G/DL (ref 3.5–5)
ALP SERPL-CCNC: 64 U/L (ref 34–104)
ALT SERPL W P-5'-P-CCNC: 12 U/L (ref 7–52)
ANION GAP SERPL CALCULATED.3IONS-SCNC: 8 MMOL/L
APTT 1H NP PPP: 33 SECONDS (ref 24–36)
APTT IMM NP PPP: 32.3 SECONDS (ref 24–36)
APTT PPP: 38 SECONDS (ref 23–37)
APTT PPP: 55 SECONDS (ref 23–37)
AST SERPL W P-5'-P-CCNC: 19 U/L (ref 13–39)
BILIRUB SERPL-MCNC: 0.64 MG/DL (ref 0.2–1)
BUN SERPL-MCNC: 29 MG/DL (ref 5–25)
CALCIUM SERPL-MCNC: 9.1 MG/DL (ref 8.4–10.2)
CHLORIDE SERPL-SCNC: 104 MMOL/L (ref 96–108)
CO2 SERPL-SCNC: 25 MMOL/L (ref 21–32)
CREAT SERPL-MCNC: 1.13 MG/DL (ref 0.6–1.3)
ERYTHROCYTE [DISTWIDTH] IN BLOOD BY AUTOMATED COUNT: 14.4 % (ref 11.6–15.1)
GFR SERPL CREATININE-BSD FRML MDRD: 46 ML/MIN/1.73SQ M
GLUCOSE SERPL-MCNC: 95 MG/DL (ref 65–140)
HCT VFR BLD AUTO: 37.4 % (ref 34.8–46.1)
HGB BLD-MCNC: 11.9 G/DL (ref 11.5–15.4)
INR PPP: 1.87 (ref 0.84–1.19)
MCH RBC QN AUTO: 31.2 PG (ref 26.8–34.3)
MCHC RBC AUTO-ENTMCNC: 31.8 G/DL (ref 31.4–37.4)
MCV RBC AUTO: 98 FL (ref 82–98)
PLATELET # BLD AUTO: 182 THOUSANDS/UL (ref 149–390)
PMV BLD AUTO: 11 FL (ref 8.9–12.7)
POTASSIUM SERPL-SCNC: 4.1 MMOL/L (ref 3.5–5.3)
PROT SERPL-MCNC: 6.8 G/DL (ref 6.4–8.4)
PROTHROMBIN TIME: 21.7 SECONDS (ref 11.6–14.5)
RBC # BLD AUTO: 3.82 MILLION/UL (ref 3.81–5.12)
SODIUM SERPL-SCNC: 137 MMOL/L (ref 135–147)
WBC # BLD AUTO: 8.03 THOUSAND/UL (ref 4.31–10.16)

## 2023-12-04 PROCEDURE — 97535 SELF CARE MNGMENT TRAINING: CPT

## 2023-12-04 PROCEDURE — 99232 SBSQ HOSP IP/OBS MODERATE 35: CPT | Performed by: INTERNAL MEDICINE

## 2023-12-04 PROCEDURE — 99223 1ST HOSP IP/OBS HIGH 75: CPT | Performed by: INTERNAL MEDICINE

## 2023-12-04 PROCEDURE — 87070 CULTURE OTHR SPECIMN AEROBIC: CPT | Performed by: INTERNAL MEDICINE

## 2023-12-04 PROCEDURE — 97167 OT EVAL HIGH COMPLEX 60 MIN: CPT

## 2023-12-04 PROCEDURE — 80053 COMPREHEN METABOLIC PANEL: CPT | Performed by: INTERNAL MEDICINE

## 2023-12-04 PROCEDURE — 99239 HOSP IP/OBS DSCHRG MGMT >30: CPT | Performed by: INTERNAL MEDICINE

## 2023-12-04 PROCEDURE — 85027 COMPLETE CBC AUTOMATED: CPT | Performed by: INTERNAL MEDICINE

## 2023-12-04 PROCEDURE — 99222 1ST HOSP IP/OBS MODERATE 55: CPT | Performed by: INTERNAL MEDICINE

## 2023-12-04 PROCEDURE — 87205 SMEAR GRAM STAIN: CPT | Performed by: INTERNAL MEDICINE

## 2023-12-04 PROCEDURE — 85730 THROMBOPLASTIN TIME PARTIAL: CPT | Performed by: INTERNAL MEDICINE

## 2023-12-04 PROCEDURE — 85610 PROTHROMBIN TIME: CPT | Performed by: INTERNAL MEDICINE

## 2023-12-04 PROCEDURE — 85732 THROMBOPLASTIN TIME PARTIAL: CPT | Performed by: INTERNAL MEDICINE

## 2023-12-04 RX ORDER — VANCOMYCIN HYDROCHLORIDE 1 G/200ML
10 INJECTION, SOLUTION INTRAVENOUS EVERY 24 HOURS
Status: DISCONTINUED | OUTPATIENT
Start: 2023-12-04 | End: 2023-12-04

## 2023-12-04 RX ORDER — CEFADROXIL 500 MG/1
1000 CAPSULE ORAL ONCE
Status: DISCONTINUED | OUTPATIENT
Start: 2023-12-04 | End: 2023-12-04

## 2023-12-04 RX ORDER — CEFDINIR 300 MG/1
300 CAPSULE ORAL ONCE
Status: COMPLETED | OUTPATIENT
Start: 2023-12-04 | End: 2023-12-04

## 2023-12-04 RX ORDER — CEFADROXIL 500 MG/1
500 CAPSULE ORAL EVERY 12 HOURS SCHEDULED
Qty: 6 CAPSULE | Refills: 0 | Status: SHIPPED | OUTPATIENT
Start: 2023-12-05 | End: 2023-12-08

## 2023-12-04 RX ORDER — CEFAZOLIN SODIUM 2 G/50ML
2000 SOLUTION INTRAVENOUS EVERY 8 HOURS
Status: DISCONTINUED | OUTPATIENT
Start: 2023-12-04 | End: 2023-12-04

## 2023-12-04 RX ORDER — CEFADROXIL 500 MG/1
500 CAPSULE ORAL ONCE
Status: DISCONTINUED | OUTPATIENT
Start: 2023-12-04 | End: 2023-12-04

## 2023-12-04 RX ADMIN — METOPROLOL TARTRATE 75 MG: 50 TABLET, FILM COATED ORAL at 08:58

## 2023-12-04 RX ADMIN — OXYCODONE HYDROCHLORIDE AND ACETAMINOPHEN 500 MG: 500 TABLET ORAL at 08:57

## 2023-12-04 RX ADMIN — FAMOTIDINE 20 MG: 20 TABLET ORAL at 08:57

## 2023-12-04 RX ADMIN — GABAPENTIN 800 MG: 400 CAPSULE ORAL at 12:54

## 2023-12-04 RX ADMIN — FLUTICASONE PROPIONATE 1 SPRAY: 50 SPRAY, METERED NASAL at 08:57

## 2023-12-04 RX ADMIN — TORSEMIDE 20 MG: 20 TABLET ORAL at 08:58

## 2023-12-04 RX ADMIN — Medication 400 MG: at 08:57

## 2023-12-04 RX ADMIN — CEFDINIR 300 MG: 300 CAPSULE ORAL at 17:26

## 2023-12-04 RX ADMIN — ACETAMINOPHEN 650 MG: 325 TABLET ORAL at 04:11

## 2023-12-04 RX ADMIN — PRAMIPEXOLE DIHYDROCHLORIDE 0.5 MG: 0.5 TABLET ORAL at 17:27

## 2023-12-04 RX ADMIN — LORATADINE 10 MG: 10 TABLET ORAL at 08:58

## 2023-12-04 RX ADMIN — OXYCODONE HYDROCHLORIDE AND ACETAMINOPHEN 500 MG: 500 TABLET ORAL at 17:26

## 2023-12-04 RX ADMIN — Medication 400 MG: at 17:26

## 2023-12-04 RX ADMIN — GABAPENTIN 800 MG: 400 CAPSULE ORAL at 17:26

## 2023-12-04 RX ADMIN — CEFAZOLIN SODIUM 2000 MG: 2 SOLUTION INTRAVENOUS at 12:59

## 2023-12-04 RX ADMIN — GABAPENTIN 800 MG: 400 CAPSULE ORAL at 08:58

## 2023-12-04 RX ADMIN — PANTOPRAZOLE SODIUM 20 MG: 20 TABLET, DELAYED RELEASE ORAL at 08:57

## 2023-12-04 NOTE — DISCHARGE INSTR - OTHER ORDERS
Plan:   Skin care plans:    1-Cleanse wound to right leg with NSS & pat dry. Pack wound with 1/4" Iodoform packing & lightly tape edge to skin. Cover with gauze or ABD, oscar and tape & change every other day & as needed. 2-Cleanse wound to left leg with NSS & pat dry. Apply Adaptic cut to size of wound then cover with Maxorb Ag+ silver alginate cut to size of wound, ABD or gauze, oscar & tape. Change daily & as needed. 3-Hydraguard barrier cream to bilateral sacrum, buttock and heels 2x/day and as needed. 4-Float heels on 2 pillows in bed to offload pressure so heels are not in contact with mattress or pillows. 5-Use pressure redistribution cushion in chair when out of bed. Limit prolonged sitting   6-Moisturize skin daily with skin nourishing cream.  7-Turn/reposition every 2 hrs in bed for pressure re-distribution on skin. 8-Follow up at [address] [place] [address] - call [phone] to make or confirm appointment.

## 2023-12-04 NOTE — ASSESSMENT & PLAN NOTE
Renal function remained stable.     Results from last 7 days   Lab Units 12/04/23  0426 12/03/23  0503 12/02/23  1003   BUN mg/dL 29* 21 27*   CREATININE mg/dL 1.13 0.86 1.00   EGFR ml/min/1.73sq m 46 64 53

## 2023-12-04 NOTE — PROGRESS NOTES
Nadeem Leslie is a 82 y.o. female who is currently ordered Vancomycin IV with management by the Pharmacy Consult service. Relevant clinical data and objective / subjective history reviewed. Vancomycin Assessment:  Indication and Goal AUC/Trough: Soft tissue (goal -600, trough >10), -600, trough >10  Clinical Status: stable  Micro:     Renal Function:  SCr: 1.13 mg/dL  CrCl: 44.9 mL/min  Renal replacement: Not on dialysis  Days of Therapy: 3  Current Dose: 1500 mg IV q24h  Vancomycin Plan:  New Dosin mg IV q24h  Estimated AUC: 419 mcg*hr/mL  Estimated Trough: 12.6 mcg/mL  Next Level: 2023 at 0600  Renal Function Monitoring: Daily BMP and [place] will continue to follow closely for s/sx of nephrotoxicity, infusion reactions and appropriateness of therapy. BMP and CBC will be ordered per protocol. We will continue to follow the patient’s culture results and clinical progress daily.     Alejandra Malhotra, Pharmacist

## 2023-12-04 NOTE — PLAN OF CARE
Problem: OCCUPATIONAL THERAPY ADULT  Goal: Performs self-care activities at highest level of function for planned discharge setting. See evaluation for individualized goals. Description: Treatment Interventions: ADL retraining, Functional transfer training, Endurance training, UE strengthening/ROM, Equipment evaluation/education, Patient/family training, Compensatory technique education, Continued evaluation, Energy conservation, Activityengagement  Equipment Recommended: Shower/Tub chair with back ($)       See flowsheet documentation for full assessment, interventions and recommendations. Note: Limitation: Decreased ADL status, Decreased endurance, Decreased self-care trans, Decreased high-level ADLs     Assessment: Pt is an 81yo female admitted to [facility] on 12/[facility]/23 w/ worsening erythema surrounding R knee. Diagnosed cellulitis of R LE. Pt reports living w/ her spouse in [address] [place] [address] w/ primarily first floor set- up. Pt reports I w/ ADL/ IADL using cane in community. Upon eval, pt alert and oriented. Pt required  set-up, + time to complete UBD/LBD w/ mod I, mod I bed mobility, mod I sit <> stand, S functional mobility using cane. Pt is completing ADLs below baseline level of I w/ decreased endurance, standing tolerance / balance. Pt would benefit from OT In acute care to maximize functional independence. No (OT)rehab needs when medically stable for discharge from acute care.  Will continue to follow     Rehab Resource Intensity Level, OT: No post-acute rehabilitation needs

## 2023-12-04 NOTE — PROGRESS NOTES
[address] [facility]  Progress Note  Name: Rosette Oliva  MRN: [id number]  Unit/Bed#: 913 [facility] 422-01 I Date of Admission: 12/2/2023   Date of Service: 12/4/2023 I Hospital Day: 2    Assessment/Plan   * Cellulitis of right leg  Assessment & Plan  History of aortic stenosis paroxysmal atrial fibrillation diastolic CHF restless leg and polyneuropathy who has been battling lower extremity wounds since hitting legs on car door presents back to the hospital for persistent and worsening erythema of right leg surrounding the knee  Denies any new injuries. Multiple drug allergies. Given vancomycin and amikacin in the ED. Reviewed previous wound cultures with CNS/staph epi. Does not appear to have evidence of joint infection   Continue monotherapy vancomycin. Continues to improve over the weekend  Follow-up on ID and wound care for further recommendations given multiple antibiotic allergies    Aortic stenosis, severe  Assessment & Plan  Follows with cardiology as an outpatient    Chronic diastolic CHF (congestive heart failure) ([address])  Assessment & Plan  Wt Readings from Last 3 Encounters:   12/02/23 100 kg (221 lb)   10/20/23 102 kg (225 lb 8 oz)   10/11/23 103 kg (228 lb)     Compensated. Continue torsemide home dosing. Coagulopathy (HCC)  Assessment & Plan  Chronic anticoagulation with coagulopathy: Elevated PT and PTT. Relation to xarelto? ED discussed with hematology with recommendation to order a mixing study  Does have ecchymosis of right leg from wrapping but no other concern for bleeding. Follow-up with hematology consultation for anticoagulation recommendations    Results from last 7 days   Lab Units 12/04/23  0426 12/03/23  0503 12/02/23  1126   INR  1.87* 2.34* 6.05*   PTT seconds 55* 47* 68*  68*         Stage 3a chronic kidney disease (HCC)  Assessment & Plan  Renal function remained stable.     Results from last 7 days   Lab Units 12/04/23  0426 12/03/23  0503 12/02/23  1003 BUN mg/dL 29* 21 27*   CREATININE mg/dL 1.13 0.86 1.00   EGFR ml/min/1.73sq m 46 64 53         GERD (gastroesophageal reflux disease)  Assessment & Plan  Continue pantoprazole and famotidine    RLS (restless legs syndrome)  Assessment & Plan  Continue pramipexole    Sensory polyneuropathy  Assessment & Plan  Polyneuropathy follows with neurology as an outpatient  Confirmed dosing and reviewed on . Continue gabapentin 800 mg 4 times daily    Atrial fibrillation (HCC) [I48.91]  Assessment & Plan  Paroxysmal atrial fibrillation status post ablation and pacemaker  Continue metoprolol. Anticoagulation: Holding xarelto due to coagulopathy and will discuss with hematology regarding anticoagulation recommendations    VTE Pharmacologic Prophylaxis: VTE Score: 3 Moderate Risk (Score 3-4) - Pharmacological DVT Prophylaxis Contraindicated. Sequential Compression Devices Ordered. Mobility:  Basic Mobility Inpatient Raw Score: 19  JH-HLM Goal: 6: Walk 10 steps or more  JH-HLM Achieved: 6: Walk 10 steps or more  HLM Goal achieved. Continue to encourage appropriate mobility. Patient Centered Rounds: I have performed bedside rounds with nursing staff today. Discussions with Specialists or Other Care Team Provider: Case management    Education and Discussions with Family / Patient:     Time Spent for Care: This time was spent on one or more of the following: performing physical exam; counseling and coordination of care; obtaining or reviewing history; documenting in the medical record; reviewing/ordering tests, medications or procedures; communicating with other healthcare professionals and discussing with patient's family/caregivers.     Current Length of Stay: 2 day(s)  Current Patient Status: Inpatient   Certification Statement: The patient will continue to require additional inpatient hospital stay due to IV antibiotics  Discharge Plan: Anticipate discharge later today or tomorrow to home awaiting ID and wound care evaluations    Code Status: Level 1 - Full Code      Subjective:   Patient seen and examined. No complaints. Feeling better. No diarrhea    Objective:   Vitals: Blood pressure 135/90, pulse 92, temperature 97.8 °F (36.6 °C), resp. rate 18, height 5' 4" (1.626 m), weight 100 kg (221 lb), SpO2 94 %. No intake or output data in the 24 hours ending 12/04/23 3199    Physical Exam  Vitals reviewed. Constitutional:       General: She is not in acute distress. Appearance: Normal appearance. She is obese. HENT:      Head: Atraumatic. Cardiovascular:      Rate and Rhythm: Regular rhythm. Heart sounds: Murmur heard. Pulmonary:      Breath sounds: Decreased breath sounds present. No wheezing. Abdominal:      General: Bowel sounds are normal.      Palpations: Abdomen is soft. Tenderness: There is no guarding or rebound. Skin:     General: Skin is warm. Findings: Bruising (Ecchymosis both legs) and erythema (Improved around right knee) present. Neurological:      Mental Status: She is alert. Mental status is at baseline.    Psychiatric:         Mood and Affect: Mood normal.       Additional Data:   Labs:  Results from last 7 days   Lab Units 12/04/23  0426 12/03/23  0503 12/02/23  1126 12/02/23  1003   WBC Thousand/uL 8.03 7.12  --  9.84   HEMOGLOBIN g/dL 11.9 11.5  --  13.0   PLATELETS Thousands/uL 182 164  --  186   MCV fL 98 98  --  96   INR  1.87* 2.34* 6.05* 5.70*     Results from last 7 days   Lab Units 12/04/23  0426 12/03/23  0503 12/02/23  1003   SODIUM mmol/L 137 136 140   POTASSIUM mmol/L 4.1 4.1 4.0   CHLORIDE mmol/L 104 104 101   CO2 mmol/L 25 26 32   ANION GAP mmol/L 8 6 7   BUN mg/dL 29* 21 27*   CREATININE mg/dL 1.13 0.86 1.00   CALCIUM mg/dL 9.1 9.0 9.7   ALBUMIN g/dL 3.6 3.6 4.2   TOTAL BILIRUBIN mg/dL 0.64 1.04* 1.01*   ALK PHOS U/L 64 61 74   ALT U/L 12 12 18   AST U/L 19 17 24   EGFR ml/min/1.73sq m 46 64 53   GLUCOSE RANDOM mg/dL 95 85 86     Results from last 7 days Lab Units 12/02/23  1003   MAGNESIUM mg/dL 2.4                  Results from last 7 days   Lab Units 12/02/23  1003   LACTIC ACID mmol/L 0.9   PROCALCITONIN ng/ml 0.07                 * I Have Reviewed All Lab Data Listed Above. Cultures:   Results from last 7 days   Lab Units 12/02/23  1122 12/02/23  1017 12/02/23  1003   BLOOD CULTURE   --  No Growth at 24 hrs. No Growth at 24 hrs. URINE CULTURE  20,000-29,000 cfu/ml  --   --    INFLUENZA A PCR   --   --  Negative       Results from last 7 days   Lab Units 12/02/23  1003   SARS-COV-2  Negative   INFLUENZA A PCR  Negative   INFLUENZA B PCR  Negative   RSV PCR  Negative           Lines/Drains:  Invasive Devices       Peripheral Intravenous Line  Duration             Peripheral IV 12/03/23 Distal;Right;Ventral (anterior) Forearm <1 day                  Telemetry:      Imaging:  Imaging Reports Reviewed Today Include:   No results found. Scheduled Meds:  Current Facility-Administered Medications   Medication Dose Route Frequency Provider Last Rate    acetaminophen  650 mg Oral Q4H PRN Greg Marroquin DO      ascorbic acid  500 mg Oral BID Greg Marroquin DO      famotidine  20 mg Oral Daily Corona Castro, DO      fluticasone  1 spray Each Nare Daily Corona Castro, DO      gabapentin  800 mg Oral 4x Daily Corona Castro, DO      loratadine  10 mg Oral Daily Corona Castro, DO      magnesium Oxide  400 mg Oral BID Corona Matthew, DO      metoprolol tartrate  75 mg Oral Q12H [address] Corona Castro, DO      ondansetron  4 mg Intravenous Q4H PRN Greg Marroquin,       pantoprazole  20 mg Oral Daily Corona Castro, DO      pramipexole  0.5 mg Oral BID Corona Castro, DO      torsemide  20 mg Oral Daily Corona Castro, DO      vancomycin  10 mg/kg Intravenous Q24H Greg Marroquin DO         Today, Patient Was Seen By: Greg Marroquin DO    ** Please Note: Dictation voice to text software may have been used in the creation of this document.  **

## 2023-12-04 NOTE — ASSESSMENT & PLAN NOTE
History of aortic stenosis paroxysmal atrial fibrillation diastolic CHF restless leg and polyneuropathy who has been battling lower extremity wounds since hitting legs on car door presents back to the hospital for persistent and worsening erythema of right leg surrounding the knee  Denies any new injuries. Multiple drug allergies. Given vancomycin and amikacin in the ED. Reviewed previous wound cultures with CNS/staph epi. Does not appear to have evidence of joint infection   Continue monotherapy vancomycin. Continue to improve  Has been seen by ID and wound care. Will be discharged with cefadroxil to complete a 7-day course.

## 2023-12-04 NOTE — ASSESSMENT & PLAN NOTE
History of aortic stenosis paroxysmal atrial fibrillation diastolic CHF restless leg and polyneuropathy who has been battling lower extremity wounds since hitting legs on car door presents back to the hospital for persistent and worsening erythema of right leg surrounding the knee  Denies any new injuries. Multiple drug allergies. Given vancomycin and amikacin in the ED. Reviewed previous wound cultures with CNS/staph epi. Does not appear to have evidence of joint infection   Continue monotherapy vancomycin.   Continues to improve over the weekend  Follow-up on ID and wound care for further recommendations given multiple antibiotic allergies

## 2023-12-04 NOTE — ASSESSMENT & PLAN NOTE
Chronic anticoagulation with coagulopathy: Elevated PT and PTT. Relation to xarelto?   ED discussed with hematology with recommendation to order a mixing study  Does have ecchymosis of right leg from wrapping but no other concern for bleeding actively at this time    Results from last 7 days   Lab Units 12/04/23  0426 12/03/23  0503 12/02/23  1126 12/02/23  1003   INR  1.87* 2.34* 6.05* 5.70*   PTT seconds 55* 47* 68*  68* 70*

## 2023-12-04 NOTE — DISCHARGE SUMMARY
[address]  Discharge- Stephanie Puri 1940, 82 y.o. female MRN: [id number]  Unit/Bed#: [address] [place] [address] Encounter: [id number]  Primary Care Provider: Kristi Cortez MD   Date and time admitted to hospital: 12/2/2023  9:08 AM    Discharge Diagnoses:  * Cellulitis of right leg  Assessment & Plan  History of aortic stenosis paroxysmal atrial fibrillation diastolic CHF restless leg and polyneuropathy who has been battling lower extremity wounds since hitting legs on car door presents back to the hospital for persistent and worsening erythema of right leg surrounding the knee  Denies any new injuries. Multiple drug allergies. Given vancomycin and amikacin in the ED. Reviewed previous wound cultures with CNS/staph epi. Does not appear to have evidence of joint infection   Continue monotherapy vancomycin. Continue to improve  Has been seen by ID and wound care. Will be discharged with cefadroxil to complete a 7-day course. Atrial fibrillation ([facility]) [I48.91]  Assessment & Plan  Paroxysmal atrial fibrillation status post ablation and pacemaker  Continue metoprolol. Anticoagulation: Holding xarelto due to coagulopathy. Discussed with Dr. Herb Turpin hematology and Dr. Johnny Hussein cardiology: Holding anticoagulation for now. Needs close follow-up with hematology for further workup and cardiology for anticoagulation options. Risk of stroke was discussed with the patient and son. Risk of bleeding with coagulopathy is higher and needs to be addressed before restarting anticoagulation. Aortic stenosis, severe  Assessment & Plan  Follows with cardiology as an outpatient    Chronic diastolic CHF (congestive heart failure) ([facility])  Assessment & Plan  Wt Readings from Last 3 Encounters:   12/02/23 100 kg (221 lb)   10/20/23 102 kg (225 lb 8 oz)   10/11/23 103 kg (228 lb)     Compensated. Continue torsemide home dosing.     Coagulopathy [facility])  Assessment & Plan  Chronic anticoagulation with coagulopathy: Elevated PT and PTT. Relation to xarelto? ED discussed with hematology with recommendation to order a mixing study  Does have ecchymosis of right leg from wrapping but no other concern for bleeding actively at this time    Results from last 7 days   Lab Units 12/04/23  0426 12/03/23  0503 12/02/23  1126 12/02/23  1003   INR  1.87* 2.34* 6.05* 5.70*   PTT seconds 55* 47* 68*  68* 70*       Stage 3a chronic kidney disease (HCC)  Assessment & Plan  Renal function remained stable. Results from last 7 days   Lab Units 12/04/23  0426 12/03/23  0503 12/02/23  1003   BUN mg/dL 29* 21 27*   CREATININE mg/dL 1.13 0.86 1.00   EGFR ml/min/1.73sq m 46 64 53         GERD (gastroesophageal reflux disease)  Assessment & Plan  Continue pantoprazole and famotidine    RLS (restless legs syndrome)  Assessment & Plan  Continue pramipexole    Sensory polyneuropathy  Assessment & Plan  Polyneuropathy follows with neurology as an outpatient  Confirmed dosing and reviewed on . Continue gabapentin 800 mg 4 times daily      Medical Problems       Resolved Problems  Date Reviewed: 12/4/2023   None        Discharging Physician / Practitioner: Jose G Gallo DO  PCP: Asael Torre MD  Admission Date:   Admission Orders (From admission, onward)       Ordered        12/02/23 1241  [address]  Once                        Discharge Date: 12/04/23    Consultations During Hospital Stay:  [address] CONSULT TO INFECTIOUS DISEASES     Procedures Performed:   * No surgery found *     Images:   No results found.     Lab Results:   Results from last 7 days   Lab Units 12/04/23  0426 12/03/23  0503 12/02/23  1126 12/02/23  1003   WBC Thousand/uL 8.03 7.12  --  9.84   HEMOGLOBIN g/dL 11.9 11.5  --  13.0   HEMATOCRIT % 37.4 37.2  --  40.6   MCV fL 98 98  --  96   PLATELETS Thousands/uL 182 164  --  186   INR  1.87* 2.34* 6.05* 5.70*     Results from last 7 days   Lab Units 12/04/23 0426 12/03/23  0503 12/02/23  1003   SODIUM mmol/L 137 136 140   POTASSIUM mmol/L 4.1 4.1 4.0   CHLORIDE mmol/L 104 104 101   CO2 mmol/L 25 26 32   BUN mg/dL 29* 21 27*   CREATININE mg/dL 1.13 0.86 1.00   CALCIUM mg/dL 9.1 9.0 9.7   ALBUMIN g/dL 3.6 3.6 4.2   TOTAL BILIRUBIN mg/dL 0.64 1.04* 1.01*   ALK PHOS U/L 64 61 74   ALT U/L 12 12 18   AST U/L 19 17 24   EGFR ml/min/1.73sq m 46 64 53   GLUCOSE RANDOM mg/dL 95 85 86               Results from last 7 days   Lab Units 12/02/23  1003   LACTIC ACID mmol/L 0.9   PROCALCITONIN ng/ml 0.07           Results from last 7 days   Lab Units 12/02/23  1122   COLOR UA  Yellow   CLARITY UA  Clear   SPEC GRAV UA  1.010   PH UA  7.5   LEUKOCYTES UA  Small*   NITRITE UA  Negative   GLUCOSE UA mg/dl Negative   KETONES UA mg/dl Negative   BLOOD UA  Negative      Results from last 7 days   Lab Units 12/02/23  1122   RBC UA /hpf 2-4   WBC UA /hpf 10-20*   EPITHELIAL CELLS WET PREP /hpf Occasional   BACTERIA UA /hpf None Seen      Results from last 7 days   Lab Units 12/04/23  1101 12/02/23  1122 12/02/23  1017 12/02/23  1003   BLOOD CULTURE   --   --  No Growth at 48 hrs. No Growth at 48 hrs.    GRAM STAIN RESULT  Rare Polys*  4+ Gram positive rods*  2+ Gram positive cocci in pairs*  --   --   --    URINE CULTURE   --  20,000-29,000 cfu/ml  --   --    INFLUENZA A PCR   --   --   --  Negative     Results from last 7 days   Lab Units 12/02/23  1003   SARS-COV-2  Negative   INFLUENZA A PCR  Negative   INFLUENZA B PCR  Negative   RSV PCR  Negative       Incidental Findings:      Test Results Pending at Discharge (will require follow up):   Pending Labs       Order Current Status    Equal mix, APTT In process    Blood culture #1 Preliminary result    Blood culture #2 Preliminary result    Wound culture and Gram stain Preliminary result           Reason for Admission:   Wound Check (Patient who has been going to wound center for past three months, wears compression stockings to both legs, but last night began with redness to right knee and right below knee,took stocking off,but redness persists)    Hospital Course:   Luke Feliz is a 82 y.o. female patient who originally presented to the hospital on 12/2/2023 due to lower extremity wounds. She has been following up with wound care as an outpatient but had new erythema surrounding right knee. She came to the hospital where she was started on IV antibiotics. Unfortunately on admission she was also found to have elevated PT/PTT/INR. Her anticoagulation remains on hold. Her cellulitis is much improved. She will be discharged with oral antibiotics. In regards to her coagulopathy, hematology recommends holding further anticoagulation until further workup can be determined. This information was relayed to the patient's primary cardiologist Dr. Malcolm Lawson. She did have some slight positional dizziness when walking to the bathroom however would resolve after 1 minute of rest likely vertiginous. Please see above list of diagnoses and related plan for additional information. Condition at Discharge: stable     Discharge Day Visit / Exam:   Please refer to progress note dated earlier today. Discussion with Family: Son on telephone    Discharge instructions/Information to patient and family:   See after visit summary for information provided to patient and family. Provisions for Follow-Up Care:  See after visit summary for information related to follow-up care and any pertinent home health orders. Mobility at time of Discharge:  Basic Mobility Inpatient Raw Score: 19  JH-HLM Goal: 6: Walk 10 steps or more  JH-HLM Achieved: 6: Walk 10 steps or more  HLM Goal achieved. Continue to encourage appropriate mobility. Disposition:   Home    Planned Readmission: No     Discharge Statement:  I spent 35 minutes discharging the patient. This time was spent on the day of discharge. I had direct contact with the patient on the day of discharge. Greater than 50% of the total time was spent examining patient, answering all patient questions, arranging and discussing plan of care with patient as well as directly providing post-discharge instructions. Additional time then spent on discharge activities. Discharge Medications:  See after visit summary for reconciled discharge medications provided to patient and family.       ** Please Note: This note has been constructed using a voice recognition system **

## 2023-12-04 NOTE — CONSULTS
Consultation - Infectious Disease   Lashon Ortiz 82 y.o. female MRN: [id number]  Unit/Bed#: [address] [place] [address] Encounter: [id number]      IMPRESSION & RECOMMENDATIONS:   Impression/Recommendations:  RLE cellulitis. In setting of lymphedema, wound. Appearance suggestive of Strep. Blood cultures negative. Clinically stable without sepsis. Improved. Rec:  Change antibiotics to cefazolin  When ready for D/C change to Duricef 500 mg PO Q12 with plan to continue through 12/8 to complete 7 days total  Risk factor management as below    Chronic bilateral LE wounds. Risk factor for cellulitis as below. Followed by [facility], undergoes periodic debridement. Rec:  [address] [place] [address] Dr and outpatient follow-up with [facility]    Chronic lymphedema. Risk factor for cellulitis as above. Rec:  Edema management per primary    MO. DM with DPN. Risk factor for infection. Last A1c 8/23 was 5.8    Status post bilateral TKA. No clinical exam evidence for infection. Multiple antibiotic allergies. Noted to have rash to Keflex in 2015 but has since tolerated cefazolin and Keflex on multiple occasions, last in 4/2022. Rec:  Remove Keflex from allergy list  Trial of cefazolin as above    I discussed with Dr. Bridgette Melgoza the above plan to change to cefazolin. He agrees with the plan. The patient is stable from an ID standpoint. Antibiotics:  Vancomycin #3    Thank you for this consultation. We will follow along with you. HISTORY OF PRESENT ILLNESS:  Reason for Consult: RLE cellulitis    HPI: Lissette Worley is a 82 y.o. female with mild, chronic lower extremity lymphedema, chronic lower extremity traumatic wounds followed by the wound management center. Undergoes periodic debridement, also managed with Unna boots. Presented to the ED 12/2 with redness of the right lower extremity. Upon presentation noted to be afebrile with a normal WBC. INR noted to be markedly elevated. Underwent Dopplers which showed no acute DVT.   Was started on vancomycin given multiple antibiotic allergies. In performing this consult, I have reviewed prior admission and outpatient visit records in detail. REVIEW OF SYSTEMS:  Denies diarrhea  A complete system-based review of systems is otherwise negative. PAST MEDICAL HISTORY:  Past Medical History:   Diagnosis Date    Arthritis     Atrial fibrillation ([address])     Carrero's esophagus     last assessed: 1/23/2018    BRCA1 negative     BRCA2 negative     Breast cancer ([address]) 2006    stage 1 (left), given adjuvant radiation with Arimidex x 5 years    Cancer ([address]) 2006    Left Breast, Lumpectomy    Cataract     last assessed: 3/11/2014    Chronic diastolic CHF (congestive heart failure) ([address]) 11/21/2022    Dysplasia of toenail     last assessed: 8/29/2017    Esophageal reflux     GERD (gastroesophageal reflux disease)     Gross hematuria     last assessed: 2/19/2015    Hematuria     Hiatal hernia     History of radiation therapy     Hypertension     Irregular heart beat     AFIB    Mixed sensory-motor polyneuropathy     Neuropathy     Obesity     Pacemaker     Paroxysmal atrial fibrillation ([address])     Peripheral neuropathy     Rectal bleeding     Restless leg syndrome     Shortness of breath     last assessed: 1/11/2016     Past Surgical History:   Procedure Laterality Date    BREAST BIOPSY Left 2006    BREAST LUMPECTOMY Left 2006    onset: 2006    BREAST SURGERY      CARDIAC CATHETERIZATION N/A 9/5/2023    Procedure: Cardiac RHC/LHC; Surgeon: Sarah Garcia MD;  [facility]; Service: Cardiology    CARDIAC CATHETERIZATION N/A 9/5/2023    Procedure: Cardiac Coronary Angiogram;  Surgeon: Sarah Garcia MD;  [facility]; Service: Cardiology    CARDIAC CATHETERIZATION  9/5/2023    Procedure: Cardiac catheterization;  Surgeon: Sarah Garcia MD;  [facility]: [facility];   Service: Cardiology    CARDIAC PACEMAKER PLACEMENT      x 3 2006    CATARACT EXTRACTION      COLONOSCOPY CYSTOSCOPY  2014    diagnostic    HYSTERECTOMY      ALISON BSO; due to fibroid uterus; age 38    KNEE CARTILAGE SURGERY      excision lesion of meniscus or capsule knee    KNEE SURGERY      OOPHORECTOMY Bilateral     age 38    OTHER SURGICAL HISTORY      radiation therapy    [place] ARTHRP KNE CONDYLE&PLATU MEDIAL&LAT COMPARTMENTS Left 2020    Procedure: ARTHROPLASTY KNEE TOTAL;  Surgeon: Kerry Gardiner DO;  Location: [facility];  Service: Orthopedics    KY ARTHRP KNE CONDYLE&PLATU MEDIAL&LAT COMPARTMENTS Right 2022    Procedure: ARTHROPLASTY KNEE TOTAL W ROBOT - RIGHT;  Surgeon: Kerry Gardiner DO;  Location: [facility];  Service: Orthopedics    [place] COLONOSCOPY FLX DX W/COLLJ SPEC WHEN PFRMD N/A 2017    Procedure: COLONOSCOPY;  Surgeon: Alex Ellis MD;  Location:  GI LAB; Service: Gastroenterology    [place] ESOPHAGOGASTRODUODENOSCOPY TRANSORAL DIAGNOSTIC N/A 2017    Procedure: ESOPHAGOGASTRODUODENOSCOPY (EGD); Surgeon: Ramakrishna Brito MD;  Location:  GI LAB;   Service: Gastroenterology    UPPER GASTROINTESTINAL ENDOSCOPY         FAMILY HISTORY:  Non-contributory    SOCIAL HISTORY:  Social History     Substance and Sexual Activity   Alcohol Use Not Currently     Social History     Substance and Sexual Activity   Drug Use No     Social History     Tobacco Use   Smoking Status Former    Packs/day: 1.00    Years: 25.00    Total pack years: 25.00    Types: Cigarettes    Quit date: 1980    Years since quittin.2   Smokeless Tobacco Never   Tobacco Comments    Quit over 30 years ago; quit age 41       ALLERGIES:  Allergies   Allergen Reactions    Latex      Added based on information entered during case entry, please review and add reactions, type, and severity as needed    Cephalexin Rash    Duloxetine Hcl Other (See Comments)     Facial pins and needles sensation    Erythromycin Rash    Levofloxacin Other (See Comments)     Muscular aches    Penicillins Rash    Savella [Milnacipran] Rash Sulfa Antibiotics Rash       MEDICATIONS:  All current active medications have been reviewed, including antibiotics as outlined above. PHYSICAL EXAM:  Vitals:  Temp:  [97.6 °F (36.4 °C)-97.9 °F (36.6 °C)] 97.8 °F (36.6 °C)  HR:  [] 92  Resp:  [18] 18  BP: (108-135)/(61-90) 135/90  SpO2:  [93 %-94 %] 94 %  Temp (24hrs), Av.8 °F (36.6 °C), Min:97.6 °F (36.4 °C), Max:97.9 °F (36.6 °C)  Current: Temperature: 97.8 °F (36.6 °C)     Physical Exam:  General:  Well-nourished, well-developed, in no acute distress  Eyes:  Conjunctive clear with no hemorrhages or effusions  Oropharynx:  No ulcers, no lesions  Neck:  Supple, no lymphadenopathy  Lungs:  Normal respiratory excursion, no accessory muscle use  Cardiac:  Regular rate and rhythm, extremities well perfused  Abdomen:  Soft, non-tender, non-distended  Extremities:  No peripheral cyanosis, clubbing. Blotchy nonblaching erythema right calf and thigh, no warmth. Skin:  No rashes, no ulcers  Neurological:  Moves all four extremities spontaneously, sensation grossly intact    LABS, IMAGING, & OTHER STUDIES:  Lab Results:  I have personally reviewed pertinent labs. Results from last 7 days   Lab Units 23  0426 23  0503 23  1003   POTASSIUM mmol/L 4.1 4.1 4.0   CHLORIDE mmol/L 104 104 101   CO2 mmol/L 25 26 32   BUN mg/dL 29* 21 27*   CREATININE mg/dL 1.13 0.86 1.00   EGFR ml/min/1.73sq m 46 64 53   CALCIUM mg/dL 9.1 9.0 9.7   AST U/L 19 17 24   ALT U/L 12 12 18   ALK PHOS U/L 64 61 74     Results from last 7 days   Lab Units 23  0426 23  0503 23  1003   WBC Thousand/uL 8.03 7.12 9.84   HEMOGLOBIN g/dL 11.9 11.5 13.0   PLATELETS Thousands/uL 182 164 186     Results from last 7 days   Lab Units 23  1122 23  1017 23  1003   BLOOD CULTURE   --  No Growth at 24 hrs. No Growth at 24 hrs.    URINE CULTURE  20,000-29,000 cfu/ml  --   --        Imaging Studies:   I have personally reviewed the following radiographic images in PACS:

## 2023-12-04 NOTE — ASSESSMENT & PLAN NOTE
Wt Readings from Last 3 Encounters:   12/02/23 100 kg (221 lb)   10/20/23 102 kg (225 lb 8 oz)   10/11/23 103 kg (228 lb)     Compensated. Continue torsemide home dosing. no

## 2023-12-04 NOTE — PLAN OF CARE
Problem: PAIN - ADULT  Goal: Verbalizes/displays adequate comfort level or baseline comfort level  Description: Interventions:  - Encourage patient to monitor pain and request assistance  - Assess pain using appropriate pain scale  - Administer analgesics based on type and severity of pain and evaluate response  - Implement non-pharmacological measures as appropriate and evaluate response  - Consider cultural and social influences on pain and pain management  - Notify physician/advanced practitioner if interventions unsuccessful or patient reports new pain  Outcome: Progressing     Problem: INFECTION - ADULT  Goal: Absence or prevention of progression during hospitalization  Description: INTERVENTIONS:  - Assess and monitor for signs and symptoms of infection  - Monitor lab/diagnostic results  - Monitor all insertion sites, i.e. indwelling lines, tubes, and drains  - Monitor endotracheal if appropriate and nasal secretions for changes in amount and color  - Rodney appropriate cooling/warming therapies per order  - Administer medications as ordered  - Instruct and encourage patient and family to use good hand hygiene technique  - Identify and instruct in appropriate isolation precautions for identified infection/condition  Outcome: Progressing  Goal: Absence of fever/infection during neutropenic period  Description: INTERVENTIONS:  - Monitor WBC    Outcome: Progressing     Problem: SAFETY ADULT  Goal: Patient will remain free of falls  Description: INTERVENTIONS:  - Educate patient/family on patient safety including physical limitations  - Instruct patient to call for assistance with activity   - Consult OT/PT to assist with strengthening/mobility   - Keep Call bell within reach  - Keep bed low and locked with side rails adjusted as appropriate  - Keep care items and personal belongings within reach  - Initiate and maintain comfort rounds  - Make Fall Risk Sign visible to staff  - Offer Toileting every 2 Hours, in advance of need  - Initiate/Maintain bed/chair alarm  - Obtain necessary fall risk management equipment: bed/chair alarm  - Apply yellow socks and bracelet for high fall risk patients  - Consider moving patient to room near nurses station  Outcome: Progressing  Goal: Maintain or return to baseline ADL function  Description: INTERVENTIONS:  -  Assess patient's ability to carry out ADLs; assess patient's baseline for ADL function and identify physical deficits which impact ability to perform ADLs (bathing, care of mouth/teeth, toileting, grooming, dressing, etc.)  - Assess/evaluate cause of self-care deficits   - Assess range of motion  - Assess patient's mobility; develop plan if impaired  - Assess patient's need for assistive devices and provide as appropriate  - Encourage maximum independence but intervene and supervise when necessary  - Involve family in performance of ADLs  - Assess for home care needs following discharge   - Consider OT consult to assist with ADL evaluation and planning for discharge  - Provide patient education as appropriate  Outcome: Progressing  Goal: Maintains/Returns to pre admission functional level  Description: INTERVENTIONS:  - Perform AM-PAC 6 Click Basic Mobility/ Daily Activity assessment daily.  - Set and communicate daily mobility goal to care team and patient/family/caregiver. - Collaborate with rehabilitation services on mobility goals if consulted  - Perform Range of Motion 3 times a day. - Reposition patient every 3 hours.   - Dangle patient 3 times a day  - Stand patient 3 times a day  - Ambulate patient 3 times a day  - Out of bed to chair 3 times a day   - Out of bed for meals 3 times a day  - Out of bed for toileting  - Record patient progress and toleration of activity level   Outcome: Progressing     Problem: DISCHARGE PLANNING  Goal: Discharge to home or other facility with appropriate resources  Description: INTERVENTIONS:  - Identify barriers to discharge w/patient and caregiver  - Arrange for needed discharge resources and transportation as appropriate  - Identify discharge learning needs (meds, wound care, etc.)  - Arrange for interpretive services to assist at discharge as needed  - Refer to Case Management Department for coordinating discharge planning if the patient needs post-hospital services based on physician/advanced practitioner order or complex needs related to functional status, cognitive ability, or social support system  Outcome: Progressing     Problem: Knowledge Deficit  Goal: Patient/family/caregiver demonstrates understanding of disease process, treatment plan, medications, and discharge instructions  Description: Complete learning assessment and assess knowledge base.   Interventions:  - Provide teaching at level of understanding  - Provide teaching via preferred learning methods  Outcome: Progressing     Problem: SKIN/TISSUE INTEGRITY - ADULT  Goal: Skin Integrity remains intact(Skin Breakdown Prevention)  Description: Assess:  -Perform Mik assessment every shift  -Clean and moisturize skin every shift  -Inspect skin when repositioning, toileting, and assisting with ADLS  -Assess under medical devices such as darien every shift  -Assess extremities for adequate circulation and sensation     Bed Management:  -Have minimal linens on bed & keep smooth, unwrinkled  -Change linens as needed when moist or perspiring  -Avoid sitting or lying in one position for more than 2 hours while in bed  -Keep HOB at 30degrees     Toileting:  -Offer bedside commode  -Assess for incontinence every shift  -Use incontinent care products after each incontinent episode such as calazime paste    Activity:  -Mobilize patient 2 times a day  -Encourage activity and walks on unit  -Encourage or provide ROM exercises   -Turn and reposition patient every 2 Hours  -Use appropriate equipment to lift or move patient in bed  -Instruct/ Assist with weight shifting every shift when out of bed in chair  -Consider limitation of chair time 2 hour intervals    Skin Care:  -Avoid use of baby powder, tape, friction and shearing, hot water or constrictive clothing  -Relieve pressure over bony prominences using alyvan  -Do not massage red bony areas    Next Steps:  -Teach patient strategies to minimize risks such as repositioning   -Consider consults to  interdisciplinary teams such as PT  Outcome: Progressing  Goal: Incision(s), wounds(s) or drain site(s) healing without S/S of infection  Description: INTERVENTIONS  - Assess and document dressing, incision, wound bed, drain sites and surrounding tissue  - Provide patient and family education  - Perform skin care/dressing changes every shift  Outcome: Progressing     Problem: Prexisting or High Potential for Compromised Skin Integrity  Goal: Skin integrity is maintained or improved  Description: INTERVENTIONS:  - Identify patients at risk for skin breakdown  - Assess and monitor skin integrity  - Assess and monitor nutrition and hydration status  - Monitor labs   - Assess for incontinence   - Turn and reposition patient  - Assist with mobility/ambulation  - Relieve pressure over bony prominences  - Avoid friction and shearing  - Provide appropriate hygiene as needed including keeping skin clean and dry  - Evaluate need for skin moisturizer/barrier cream  - Collaborate with interdisciplinary team   - Patient/family teaching  - Consider wound care consult   Outcome: Progressing

## 2023-12-04 NOTE — WOUND OSTOMY CARE
Progress Note - Wound   Jeannie Ortiz 82 y.o. female MRN: [id number]  Unit/Bed#: [address] [place] [address] Encounter: [id number]      Assessment: This is an 82year old female patient admitted on 12/2/23 with right LE cellulitis. She has a history of CHF, coagulopathy, CKD 3a, polyneuropathy, restless legs and AFIB. She was AAO x 3 and was able to reposition independently. She is continent of bowel and bladder and is partially independent with some ADL's. Assessment Findings:  1-Right lower extremity full thickness wound with small, thick purulent drainage noted - wound culture obtained as per Dr Eugenia Magdaleno and sent to lab. Orders in place for wound care - please see below for detailed assessments. 2-Left lower extremity full thickness wound with moderate amount of serosanguinous, yellow/green drainage - orders in place for wound care. 3-Bilateral heels and sacrobuttocks intact - orders for skin care/prevention in place. Plan:   Skin care plans:  1-Cleanse wound to RLE with NSS & pat dry. Pack wound with 1/4" Iodoform packing & lightly tape edge to skin. Cover with small Allevyn foam dressing & change daily & prn.  2-Cleanse wound to LLE with NSS & pat dry. Apply Adaptic cut to size of wound then cover with Maxorb Ag+ silver alginate cut to size of wound, ABD or gauze, oscar & tape. Change daily & prn.  3-Hydraguard to bilateral sacrum, buttock and heels BID and PRN  4-Float heels on 2 pillows to offload pressure so heels are not in contact with mattress or pillows. 5-Ehob pressure redistribution cushion in chair when out of bed. Limit prolonged sitting   6-Moisturize skin daily with skin nourishing cream.  7-Turn/reposition q2h or when medically stable for pressure re-distribution on skin. Wound 09/19/23 Traumatic Leg Left;Lateral (Active)   Wound Image   12/04/23 1038   Wound Description Granulation tissue;Pink;Beefy red;Slough; Yellow 12/04/23 1038   Dorys-wound Assessment Dry;Edema; Erythema 12/04/23 1038 Wound Length (cm) 2.4 cm 12/04/23 1038   Wound Width (cm) 2.5 cm 12/04/23 1038   Wound Depth (cm) 0.1 cm 12/04/23 1038   Wound Surface Area (cm^2) 6 cm^2 12/04/23 1038   Wound Volume (cm^3) 0.6 cm^3 12/04/23 1038   Calculated Wound Volume (cm^3) 0.6 cm^3 12/04/23 1038   Change in Wound Size % 77.78 12/04/23 1038   Drainage Amount Moderate 12/04/23 1038   Drainage Description Serosanguineous;Green;Yellow 12/04/23 1038   Treatments Cleansed 12/04/23 1038   Dressing Vaseline gauze;Calcium Alginate with Silver;ABD;Dry dressing 12/04/23 1038   Wound packed? No 12/04/23 1038   Dressing Changed New 12/04/23 1038   Dressing Status Clean;Dry; Intact 12/04/23 1038       Wound 09/26/23 Traumatic Leg Right; Anterior (Active)   Wound Image   12/04/23 1041   Wound Description Granulation tissue;Pink;Slough; Yellow 12/04/23 1041   Dorys-wound Assessment Edema; Erythema 12/04/23 1041   Wound Length (cm) 0.3 cm 12/04/23 1041   Wound Width (cm) 0.3 cm 12/04/23 1041   Wound Depth (cm) 0.4 cm 12/04/23 1041   Wound Surface Area (cm^2) 0.09 cm^2 12/04/23 1041   Wound Volume (cm^3) 0.036 cm^3 12/04/23 1041   Calculated Wound Volume (cm^3) 0.04 cm^3 12/04/23 1041   Change in Wound Size % 20 12/04/23 1041   Drainage Amount Small 12/04/23 1041   Drainage Description Purulent;Serosanguineous;Green;Yellow 12/04/23 1041   Non-staged Wound Description Full thickness 12/04/23 1041   Treatments Cleansed 12/04/23 1041   Dressing 1/4" Iodoform packing; Foam, Silicon (eg. Allevyn, etc) 12/04/23 1041   Wound packed? Yes 12/04/23 1041   Packing- # removed 0 12/04/23 1041   Packing- # inserted 1 12/04/23 1041   Dressing Changed New 12/04/23 1041   Dressing Status Clean;Dry; Intact 12/04/23 1041     Discussed assessment findings, and plan of care/recommendations with Augustine Egan. Wound care will follow along with patient throughout admission, please call or tiger text with questions and concerns. Recommendations written as orders.   Hilary Kapadia MSN, RN, CWON

## 2023-12-04 NOTE — ASSESSMENT & PLAN NOTE
Chronic anticoagulation with coagulopathy: Elevated PT and PTT. Relation to xarelto? ED discussed with hematology with recommendation to order a mixing study  Does have ecchymosis of right leg from wrapping but no other concern for bleeding.   Follow-up with hematology consultation for anticoagulation recommendations    Results from last 7 days   Lab Units 12/04/23  0426 12/03/23  0503 12/02/23  1126   INR  1.87* 2.34* 6.05*   PTT seconds 55* 47* 68*  68*

## 2023-12-04 NOTE — CONSULTS
[place]  1940    HEMATOLOGY/ONCOLOGY CONSULTATION REPORT - Emily Barboza    DISCUSSION/SUMMARY:    57-year-old female with history of atrial fibrillation on Xarelto; patient also with lower extremity cellulitis on vancomycin. On admission patient was found to have an elevated PT and PTT. Xarelto is on hold. Patient has right lower extremity bruising that seem to be better; did not have any bleeding problems. At this moment is not 100% clear why patient's PT and PTT were so elevated; likely more than 1 etiology. Xarelto can elevate the PT/INR and PTT. Recent PT mixing studies demonstrated partial but not complete correction with 50: 50 mixing. Likely the patient's acute illnesses, present medical issues, medications etc have affected the PT/INR. I spoke with pathology at [facility] today. Not clear why the PTT mixing studies was recently not performed. Pathology is going to get back to me. The below information comes from the [address] [place] [address] of Thoracic and Cardiovascular Surgery, New oral anticoagulants - a practical guide, Felicia Garcia et al, June 2015, 12 (2): 111-118    Coagulation assays    In contrast to VKA, NOACs do not require routine monitoring of coagulation. However, the measurement of their pharmacologic effects might be of value in selected patients, such as those with excessive bleeding risk due to worsening renal insufficiency, frailty, drug interactions, and overdoses [7]. Furthermore, laboratory monitoring might help in checking patient compliance or in revealing over-anticoagulation [9]. There is at present no consensus on the best methodology for assessing NOAC activity in vivo and hence guiding dosage. Unlike with VKA, traditional coagulation tests cannot be used to assess or adjust dosing [10-12]. The prothrombin time (PT), international normalized ratio (INR) and activated partial thromboplastin time (aPTT) are prolonged during NOAC treatment [7, 13].  More characteristics are summarized in Table II. However, these tests are relatively insensitive and suffer from a high degree of variability, especially at high drug concentrations (typically greater than 200 ng/ml). The sensitivity also depends on the different reagents used [10-12]. Tab. II  Interpretation of coagulation assays in patients treated with different non-vitamin K oral anticoagulants (NOAC) [7]        The concern is that patient has some underlying coagulation abnormality that will require more work-up/testing in the future (but not now with so many acute issues). Patient will eventually be brought back to hematology for follow-up and testing. Patient asked about being discharged. There are no hematology concerns to keep the patient here but I will defer to the hospitalist service. The cardiologist who prescribes the Xarelto should be made aware of the situation (the fact that patient has atrial fibrillation but is off the Xarelto). The above was discussed with the patient; all questions were answered. Please do not hesitate to contact me if you have any questions or need additional information. Thank you for this consult.  ________________________________________________________________________________    Chief Complaint   Patient presents with    Wound Check     Patient who has been going to wound center for past three months, wears compression stockings to both legs, but last night began with redness to right knee and right below knee,took stocking off,but redness persists     History of Present Illness: 31-year-old female admitted to [facility] on December 2, 2023 with progressive redness, swelling in the right knee. Patient had been following with wound care in the outpatient setting. Patient with a history of atrial fibrillation, CHF, obesity and chronic lower extremity edema. Blood work demonstrated an elevated PT and PTT. Mrs. Ortiz is presently on vancomycin.   The outpatient setting, patient had been on Xarelto 20 mg a day for the atrial fibrillation. Patient was found in bed in no apparent distress. No pain. Patient stated that the bruises in the right leg seem to be less/better. Activities are limited but about the same as before. No bruising or bleeding elsewhere. No respiratory issues. Appetite is okay. Patient is looking forward to being discharged. Review of Systems   Constitutional:  Positive for fatigue. HENT: Negative. Eyes: Negative. Respiratory: Negative. Cardiovascular: Negative. Gastrointestinal: Negative. Endocrine: Negative. Genitourinary: Negative. Musculoskeletal: Negative. Skin: Negative. Allergic/Immunologic: Negative. Neurological: Negative. Hematological: Negative. Easy bruising   Psychiatric/Behavioral: Negative. All other systems reviewed and are negative. Patient Active Problem List   Diagnosis    Hiatal hernia    Atrial fibrillation ([address]) [I48.91]    Acquired deformity of foot    Corns    Diabetic polyneuropathy associated with type 2 diabetes mellitus ([address])    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HCC)    Radiculopathy of lumbar region    Sensory polyneuropathy    RLS (restless legs syndrome)    Arthritis    Essential hypertension    Lichen sclerosus et atrophicus    Multiple lung nodules    Severe obesity (BMI 35.0-39. 9) with comorbidity ([address])    Osteopenia of multiple sites    Peripheral neuropathy    Vitamin D deficiency    Dense breast tissue on mammogram    Difficulty walking    Flat foot    Onychomycosis    Carrero's esophagus with dysplasia    Mild cognitive impairment    Pacemaker    GERD (gastroesophageal reflux disease)    Chronic edema    Deep venous insufficiency    Colon polyps    Lichen sclerosus et atrophicus of the vulva    Status post total knee replacement using cement, left    Leg swelling    Seasonal allergies    Exotropia of right eye    Stage 3a chronic kidney disease ([address])    Status post total knee replacement using cement, right    Coagulopathy (HCC)    Chronic diastolic CHF (congestive heart failure) ([address])    Aortic stenosis, severe    Chronic left shoulder pain    Non-healing wound of left lower extremity    Cellulitis of right leg     Past Medical History:   Diagnosis Date    Arthritis     Atrial fibrillation ([address])     Carrero's esophagus     last assessed: 1/23/2018    BRCA1 negative     BRCA2 negative     Breast cancer ([address]) 2006    stage 1 (left), given adjuvant radiation with Arimidex x 5 years    Cancer ([address]) 2006    Left Breast, Lumpectomy    Cataract     last assessed: 3/11/2014    Chronic diastolic CHF (congestive heart failure) ([address]) 11/21/2022    Dysplasia of toenail     last assessed: 8/29/2017    Esophageal reflux     GERD (gastroesophageal reflux disease)     Gross hematuria     last assessed: 2/19/2015    Hematuria     Hiatal hernia     History of radiation therapy     Hypertension     Irregular heart beat     AFIB    Mixed sensory-motor polyneuropathy     Neuropathy     Obesity     Pacemaker     Paroxysmal atrial fibrillation (HCC)     Peripheral neuropathy     Rectal bleeding     Restless leg syndrome     Shortness of breath     last assessed: 1/11/2016     Past Surgical History:   Procedure Laterality Date    BREAST BIOPSY Left 2006    BREAST LUMPECTOMY Left 2006    onset: 2006    BREAST SURGERY      CARDIAC CATHETERIZATION N/A 9/5/2023    Procedure: Cardiac RHC/LHC; Surgeon: Kenny Whitehead MD;  Location: [facility]; Service: Cardiology    CARDIAC CATHETERIZATION N/A 9/5/2023    Procedure: Cardiac Coronary Angiogram;  Surgeon: Kenny Whitehead MD;  [facility]; Service: Cardiology    CARDIAC CATHETERIZATION  9/5/2023    Procedure: Cardiac catheterization;  Surgeon: Kenny Whitehead MD;  [facility];   Service: Cardiology    CARDIAC PACEMAKER PLACEMENT      x 3 2006    CATARACT EXTRACTION      COLONOSCOPY      CYSTOSCOPY  04/04/2014 diagnostic    HYSTERECTOMY      ALISON BSO; due to fibroid uterus; age 38    KNEE CARTILAGE SURGERY      excision lesion of meniscus or capsule knee    KNEE SURGERY      OOPHORECTOMY Bilateral     age 38    OTHER SURGICAL HISTORY      radiation therapy    [facility] ARTHRP KNE CONDYLE&PLATU MEDIAL&LAT COMPARTMENTS Left 08/17/2020    Procedure: ARTHROPLASTY KNEE TOTAL;  Surgeon: Lele Naylor DO;  Location: [facility];  Service: Orthopedics    VA ARTHRP KNE CONDYLE&PLATU MEDIAL&LAT COMPARTMENTS Right 03/28/2022    Procedure: ARTHROPLASTY KNEE TOTAL W ROBOT - RIGHT;  Surgeon: Lele Naylor DO;  Location: [facility];  Service: Orthopedics    VA COLONOSCOPY FLX DX W/COLLJ SPEC WHEN PFRMD N/A 02/08/2017    Procedure: COLONOSCOPY;  Surgeon: Kari Elam MD;  Location: [facility]; Service: Gastroenterology    VA ESOPHAGOGASTRODUODENOSCOPY TRANSORAL DIAGNOSTIC N/A 09/20/2017    Procedure: ESOPHAGOGASTRODUODENOSCOPY (EGD); Surgeon: Rudolph Scales MD;  Location: [facility];   Service: Gastroenterology    UPPER GASTROINTESTINAL ENDOSCOPY       Family History   Problem Relation Age of Onset    Hypertension Mother     Heart disease Father     Aneurysm Father     Coronary artery disease Father         in his 76s with aneurysm    Aortic aneurysm Father         abdominal    Scleroderma Sister     Breast cancer Sister 76    Hypertension Sister     Cancer Sister     No Known Problems Son     No Known Problems Son     Testicular cancer Son 39    Thyroid cancer Son 45    Colon cancer Maternal Aunt     Colon cancer Maternal Aunt     Breast cancer Other 48        kaylee's daughter    Alcohol abuse Neg Hx     Substance Abuse Neg Hx     Mental illness Neg Hx     Depression Neg Hx      Social History     Socioeconomic History    Marital status: /Civil Union     Spouse name: Not on file    Number of children: 3    Years of education: 12    Highest education level: High school graduate   Occupational History    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      Comment: retired   Tobacco Use    Smoking status: Former     Packs/day: 1.00     Years: 25.00     Total pack years: 25.00     Types: Cigarettes     Quit date: 1980     Years since quittin.2    Smokeless tobacco: Never    Tobacco comments:     Quit over 30 years ago; quit age 41   Vaping Use    Vaping Use: Never used   Substance and Sexual Activity    Alcohol use: Not Currently    Drug use: No    Sexual activity: Not on file   Other Topics Concern    Not on file   Social History Narrative         Social Determinants of Health     Financial Resource Strain: Low Risk  (2022)    Overall Financial Resource Strain (CARDIA)     Difficulty of Paying Living Expenses: Not very hard   Food Insecurity: No Food Insecurity (2023)    Hunger Vital Sign     Worried About Running Out of Food in the Last Year: Never true     Ran Out of Food in the Last Year: Never true   Transportation Needs: No Transportation Needs (2023)    PRAPARE - Transportation     Lack of Transportation (Medical): No     Lack of Transportation (Non-Medical):  No   Physical Activity: Not on file   Stress: Not on file   Social Connections: Not on file   Intimate Partner Violence: Not on file   Housing Stability: Unknown (2023)    Housing Stability Vital Sign     Unable to Pay for Housing in the Last Year: No     Number of Places Lived in the Last Year: Not on file     Unstable Housing in the Last Year: No       Current Facility-Administered Medications:     acetaminophen (TYLENOL) tablet 650 mg, 650 mg, Oral, Q4H PRN, Corona Castro, DO, 650 mg at 23 0411    ascorbic acid (VITAMIN C) tablet 500 mg, 500 mg, Oral, BID, Corona Castro DO, 500 mg at 23 0857    famotidine (PEPCID) tablet 20 mg, 20 mg, Oral, Daily, Corona Castro, DO, 20 mg at 23 0857    fluticasone (FLONASE) 50 mcg/act nasal spray 1 spray, 1 spray, Each Nare, Daily, Corona Castro DO, 1 spray at 23 0857    gabapentin (NEURONTIN) capsule 800 mg, 800 mg, Oral, 4x Daily, Corona Matthew, DO, 800 mg at 12/04/23 0858    loratadine (CLARITIN) tablet 10 mg, 10 mg, Oral, Daily, Corona Matthew, DO, 10 mg at 12/04/23 0858    magnesium Oxide (MAG-OX) tablet 400 mg, 400 mg, Oral, BID, Corona Matthew, DO, 400 mg at 12/04/23 0857    metoprolol tartrate (LOPRESSOR) tablet 75 mg, 75 mg, Oral, Q12H [address], Corona Matthew, DO, 75 mg at 12/04/23 0858    ondansetron (ZOFRAN) injection 4 mg, 4 mg, Intravenous, Q4H PRN, Corona Mengng, DO    pantoprazole (PROTONIX) EC tablet 20 mg, 20 mg, Oral, Daily, Corona Matthew, DO, 20 mg at 12/04/23 0857    pramipexole (MIRAPEX) tablet 0.5 mg, 0.5 mg, Oral, BID, Corona Matthew, DO, 0.5 mg at 12/03/23 2138    torsemide (DEMADEX) tablet 20 mg, 20 mg, Oral, Daily, Corona Matthew, DO, 20 mg at 12/04/23 0858    vancomycin (VANCOCIN) IVPB (premix in dextrose) 1,000 mg 200 mL, 10 mg/kg, Intravenous, Q24H, Corona Mengng, DO    Allergies   Allergen Reactions    Latex      Added based on information entered during case entry, please review and add reactions, type, and severity as needed    Cephalexin Rash    Duloxetine Hcl Other (See Comments)     Facial pins and needles sensation    Erythromycin Rash    Levofloxacin Other (See Comments)     Muscular aches    Penicillins Rash    Savella [Milnacipran] Rash    Sulfa Antibiotics Rash       Vitals:    12/04/23 0753   BP: 135/90   Pulse: 92   Resp:    Temp:    SpO2: 94%     Physical Exam  Constitutional:       Appearance: She is well-developed. Comments: Please female, no respiratory distress, no signs of pain   HENT:      Head: Normocephalic and atraumatic. Right Ear: External ear normal.      Left Ear: External ear normal.   Eyes:      Conjunctiva/sclera: Conjunctivae normal.      Pupils: Pupils are equal, round, and reactive to light. Cardiovascular:      Rate and Rhythm: Normal rate and regular rhythm. Heart sounds: Normal heart sounds.    Pulmonary:      Effort: Pulmonary effort is normal.      Breath sounds: Normal breath sounds. Comments: Distant breath sounds bilaterally, clear  Abdominal:      General: Bowel sounds are normal.      Palpations: Abdomen is soft. Comments: + Bowel sounds, soft, nontender, obese cannot palpate liver or spleen   Musculoskeletal:         General: Normal range of motion. Cervical back: Normal range of motion and neck supple. Skin:     General: Skin is warm. Neurological:      Mental Status: She is alert and oriented to person, place, and time. Deep Tendon Reflexes: Reflexes are normal and symmetric. Psychiatric:         Behavior: Behavior normal.         Thought Content: Thought content normal.         Judgment: Judgment normal.     Extremities: Bilateral lower extremity edema and obesity, chronic venous changes, there are chronic skin color changes bilaterally, more so on the right, multiple areas of resolving Mohs is on the right lower extremity, no discharge, no bleeding  Lymphatics: Limited exam, no supraclavicular, cervical, axillary adenopathy bilaterally    Labs    12/4/2023 WBC = 8.03 hemoglobin = 11.9 hematocrit = 37.4 platelet = 579 UN = 29 creatinine = 1.13 calcium = 9.1 AST = 19 ALT = 12 alkaline phosphatase = 64 total protein = 6.8 total bilirubin = 0.64            12/2/2023 PT mixing studies    PT = 53.5   PT mixing 50: 50 = 24.4   PT mixing 50: 50 incubated = 25.4    Imaging    12/2/2023 vascular lower limb venous duplex study    RIGHT LOWER LIMB  No evidence of acute or chronic deep vein thrombosis . No evidence of superficial thrombophlebitis noted. Doppler evaluation shows a normal response to augmentation maneuvers. Popliteal, posterior tibial and anterior tibial arterial Doppler waveforms are  biphasic. LEFT LOWER LIMB LIMITED  Evaluation shows no evidence of thrombus in the common femoral vein.   Doppler evaluation shows a normal response to augmentation maneuvers.

## 2023-12-04 NOTE — ASSESSMENT & PLAN NOTE
Paroxysmal atrial fibrillation status post ablation and pacemaker  Continue metoprolol. Anticoagulation: Holding xarelto due to coagulopathy. Discussed with Dr. Davis General hematology and Dr. Holder cardiology: Holding anticoagulation for now. Needs close follow-up with hematology for further workup and cardiology for anticoagulation options. Risk of stroke was discussed with the patient and son. Risk of bleeding with coagulopathy is higher and needs to be addressed before restarting anticoagulation.

## 2023-12-04 NOTE — TELEPHONE ENCOUNTER
New Patient Intake Form   Patient Details:    Ry Bailey  1940    Appointment Information   Who is calling to schedule? Danitza Mass   If not self, what is the caller's name? NA   DID YOU CONFIRM INSURANCE WITH PATIENT? E verified, Routed to finance   Referring provider Dr. Colton Blair   What is the diagnosis? elevated PT and PTT     Is there a confirmed tissue diagnosis? NA     Is there a biopsy ordered or pending? Please specify dates  If yes, route to /OCC   NA     Is patient aware of diagnosis? Yes     Have you had any imaging or labs done? If yes, where? (If imaging done outside of [facility], please remind patient to bring a disk.) Yes-[facility]     If imaging done at outside facility, did you instruct patient to obtain discs and bring to visit? NA   Have you been seen by another Oncologist/Hematologist?  If so, who and where? Followed by Lenin Mota   Are the records in [facility] or Care Everywhere? Yes   Does the patient have records at another facility/hospital?    If yes, Name of facility, city and state where facility is located. [address] [place] [address]     Did you instruct patient to have records faxed to rightx and provide rightfax number? In [address] [place] [address] [address]   Is the patient willing to be seen by another provider? (This is for breast patients only) NA     Did you send new patient paperwork? Email or mail?  NA   Miscellaneous Information: The patient is scheduled for her HFU appointment with Dr. Colton Blair in the [facility] office on 1/31/24 at [address] [facility]

## 2023-12-04 NOTE — PLAN OF CARE
Problem: PAIN - ADULT  Goal: Verbalizes/displays adequate comfort level or baseline comfort level  Description: Interventions:  - Encourage patient to monitor pain and request assistance  - Assess pain using appropriate pain scale  - Administer analgesics based on type and severity of pain and evaluate response  - Implement non-pharmacological measures as appropriate and evaluate response  - Consider cultural and social influences on pain and pain management  - Notify physician/advanced practitioner if interventions unsuccessful or patient reports new pain  Outcome: Progressing     Problem: INFECTION - ADULT  Goal: Absence or prevention of progression during hospitalization  Description: INTERVENTIONS:  - Assess and monitor for signs and symptoms of infection  - Monitor lab/diagnostic results  - Monitor all insertion sites, i.e. indwelling lines, tubes, and drains  - Monitor endotracheal if appropriate and nasal secretions for changes in amount and color  - [place] appropriate cooling/warming therapies per order  - Administer medications as ordered  - Instruct and encourage patient and family to use good hand hygiene technique  - Identify and instruct in appropriate isolation precautions for identified infection/condition  Outcome: Progressing  Goal: Absence of fever/infection during neutropenic period  Description: INTERVENTIONS:  - Monitor WBC    Outcome: Progressing     Problem: SAFETY ADULT  Goal: Patient will remain free of falls  Description: INTERVENTIONS:  - Educate patient/family on patient safety including physical limitations  - Instruct patient to call for assistance with activity   - Consult OT/PT to assist with strengthening/mobility   - Keep Call bell within reach  - Keep bed low and locked with side rails adjusted as appropriate  - Keep care items and personal belongings within reach  - Initiate and maintain comfort rounds  - Make Fall Risk Sign visible to staff  - Offer Toileting every 2 Hours, in advance of need  - Initiate/Maintain bed alarm  - Obtain necessary fall risk management equipment: yellow socks  - Apply yellow socks and bracelet for high fall risk patients  - Consider moving patient to room near nurses station  Outcome: Progressing  Goal: Maintain or return to baseline ADL function  Description: INTERVENTIONS:  -  Assess patient's ability to carry out ADLs; assess patient's baseline for ADL function and identify physical deficits which impact ability to perform ADLs (bathing, care of mouth/teeth, toileting, grooming, dressing, etc.)  - Assess/evaluate cause of self-care deficits   - Assess range of motion  - Assess patient's mobility; develop plan if impaired  - Assess patient's need for assistive devices and provide as appropriate  - Encourage maximum independence but intervene and supervise when necessary  - Involve family in performance of ADLs  - Assess for home care needs following discharge   - Consider OT consult to assist with ADL evaluation and planning for discharge  - Provide patient education as appropriate  Outcome: Progressing  Goal: Maintains/Returns to pre admission functional level  Description: INTERVENTIONS:  - Perform AM-PAC 6 Click Basic Mobility/ Daily Activity assessment daily.  - Set and communicate daily mobility goal to care team and patient/family/caregiver. - Collaborate with rehabilitation services on mobility goals if consulted  - Perform Range of Motion 2 times a day. - Reposition patient every 2 hours.   - Dangle patient 2 times a day  - Stand patient 2 times a day  - Ambulate patient 2 times a day  - Out of bed to chair 2 times a day   - Out of bed for meals 2 times a day  - Out of bed for toileting  - Record patient progress and toleration of activity level   Outcome: Progressing     Problem: DISCHARGE PLANNING  Goal: Discharge to home or other facility with appropriate resources  Description: INTERVENTIONS:  - Identify barriers to discharge w/patient and caregiver  - Arrange for needed discharge resources and transportation as appropriate  - Identify discharge learning needs (meds, wound care, etc.)  - Arrange for interpretive services to assist at discharge as needed  - Refer to Case Management Department for coordinating discharge planning if the patient needs post-hospital services based on physician/advanced practitioner order or complex needs related to functional status, cognitive ability, or social support system  Outcome: Progressing     Problem: Knowledge Deficit  Goal: Patient/family/caregiver demonstrates understanding of disease process, treatment plan, medications, and discharge instructions  Description: Complete learning assessment and assess knowledge base.   Interventions:  - Provide teaching at level of understanding  - Provide teaching via preferred learning methods  Outcome: Progressing     Problem: SKIN/TISSUE INTEGRITY - ADULT  Goal: Skin Integrity remains intact(Skin Breakdown Prevention)  Description: Assess:  -Perform Mik assessment every shift  -Clean and moisturize skin every shift  -Inspect skin when repositioning, toileting, and assisting with ADLS  -Assess under medical devices such as darien every shift  -Assess extremities for adequate circulation and sensation     Bed Management:  -Have minimal linens on bed & keep smooth, unwrinkled  -Change linens as needed when moist or perspiring  -Avoid sitting or lying in one position for more than 2 hours while in bed  -Keep HOB at 30degrees     Toileting:  -Offer bedside commode  -Assess for incontinence every shift  -Use incontinent care products after each incontinent episode such as calazime paste    Activity:  -Mobilize patient 2 times a day  -Encourage activity and walks on unit  -Encourage or provide ROM exercises   -Turn and reposition patient every 2 Hours  -Use appropriate equipment to lift or move patient in bed  -Instruct/ Assist with weight shifting every shift when out of bed in chair  -Consider limitation of chair time 2 hour intervals    Skin Care:  -Avoid use of baby powder, tape, friction and shearing, hot water or constrictive clothing  -Relieve pressure over bony prominences using alyvan  -Do not massage red bony areas    Next Steps:  -Teach patient strategies to minimize risks such as repositioning   -Consider consults to  interdisciplinary teams such as PT  Outcome: Progressing  Goal: Incision(s), wounds(s) or drain site(s) healing without S/S of infection  Description: INTERVENTIONS  - Assess and document dressing, incision, wound bed, drain sites and surrounding tissue  - Provide patient and family education  - Perform skin care/dressing changes every shift  Outcome: Progressing

## 2023-12-04 NOTE — OCCUPATIONAL THERAPY NOTE
Occupational Therapy Evaluation     Patient Name: Danitza Sampson  SFEJK'[id number] Date: 12/4/2023  Problem List  Principal Problem:    Cellulitis of right leg  Active Problems:    Atrial fibrillation (HCC) [I48.91]    Sensory polyneuropathy    RLS (restless legs syndrome)    GERD (gastroesophageal reflux disease)    Stage 3a chronic kidney disease (HCC)    Coagulopathy (HCC)    Chronic diastolic CHF (congestive heart failure) ([address])    Aortic stenosis, severe    Past Medical History  Past Medical History:   Diagnosis Date    Arthritis     Atrial fibrillation ([address])     Carrero's esophagus     last assessed: 1/23/2018    BRCA1 negative     BRCA2 negative     Breast cancer ([address]) 2006    stage 1 (left), given adjuvant radiation with Arimidex x 5 years    Cancer ([address]) 2006    Left Breast, Lumpectomy    Cataract     last assessed: 3/11/2014    Chronic diastolic CHF (congestive heart failure) ([address]) 11/21/2022    Dysplasia of toenail     last assessed: 8/29/2017    Esophageal reflux     GERD (gastroesophageal reflux disease)     Gross hematuria     last assessed: 2/19/2015    Hematuria     Hiatal hernia     History of radiation therapy     Hypertension     Irregular heart beat     AFIB    Mixed sensory-motor polyneuropathy     Neuropathy     Obesity     Pacemaker     Paroxysmal atrial fibrillation (HCC)     Peripheral neuropathy     Rectal bleeding     Restless leg syndrome     Shortness of breath     last assessed: 1/11/2016     Past Surgical History  Past Surgical History:   Procedure Laterality Date    BREAST BIOPSY Left 2006    BREAST LUMPECTOMY Left 2006    onset: 2006    BREAST SURGERY      CARDIAC CATHETERIZATION N/A 9/5/2023    Procedure: Cardiac RHC/LHC; Surgeon: Lissette Craig MD;  [facility]; Service: Cardiology    CARDIAC CATHETERIZATION N/A 9/5/2023    Procedure: Cardiac Coronary Angiogram;  Surgeon: Lissette Craig MD;  [facility];   Service: Cardiology    CARDIAC CATHETERIZATION  9/5/2023    Procedure: Cardiac catheterization;  Surgeon: Cecil Frye MD;  Location:  CARDIAC CATH LAB; Service: Cardiology    CARDIAC PACEMAKER PLACEMENT      x 3 2006    CATARACT EXTRACTION      COLONOSCOPY      CYSTOSCOPY  04/04/2014    diagnostic    HYSTERECTOMY      ALISON BSO; due to fibroid uterus; age 38    KNEE CARTILAGE SURGERY      excision lesion of meniscus or capsule knee    KNEE SURGERY      OOPHORECTOMY Bilateral     age 38    OTHER SURGICAL HISTORY      radiation therapy    OK ARTHRP KNE CONDYLE&PLATU MEDIAL&LAT COMPARTMENTS Left 08/17/2020    Procedure: ARTHROPLASTY KNEE TOTAL;  Surgeon: Kerry Gardiner DO;  Location: [facility];  Service: Orthopedics    OK ARTHRP KNE CONDYLE&PLATU MEDIAL&LAT COMPARTMENTS Right 03/28/2022    Procedure: ARTHROPLASTY KNEE TOTAL W ROBOT - RIGHT;  Surgeon: Kerry Gardiner DO;  Location: [facility];  Service: Orthopedics    OK COLONOSCOPY FLX DX W/COLLJ SPEC WHEN PFRMD N/A 02/08/2017    Procedure: COLONOSCOPY;  Surgeon: Alex Ellis MD;  Location:  GI LAB; Service: Gastroenterology    OK ESOPHAGOGASTRODUODENOSCOPY TRANSORAL DIAGNOSTIC N/A 09/20/2017    Procedure: ESOPHAGOGASTRODUODENOSCOPY (EGD); Surgeon: Ramakrishna Brito MD;  Location:  GI LAB; Service: Gastroenterology    UPPER GASTROINTESTINAL ENDOSCOPY          12/04/23 1411   OT Last Visit   OT Visit Date 12/04/23  (Monday)   Note Type   Note type Evaluation  (and tx session)   Pain Assessment   Pain Assessment Tool 0-10   Pain Score No Pain   Restrictions/Precautions   Weight Bearing Precautions Per Order No   Other Precautions Fall Risk;Limb alert  (L UE limb alert)   Home Living   Type of [address] [place] [address] Dr Two level;1/2 bath on main level;Bed/bath upstairs   [address] Cane;Walker  (not using AD w/ in house, cane outside in yard, community)   Additional Comments Pt reports living w/ her  in 2 SH.  Primarily stays on first floor; goes upstairs to shower   Prior Function   Level of [place] Independent with ADLs; Independent with functional mobility; Independent with IADLS   Lives With Spouse   Receives Help From Family   IADLs Independent with driving; Independent with meal prep; Independent with medication management   Falls in the last 6 months 0   Vocational Retired   Comments Pt reports I w/ ADLs at baseline w/ out use of AD w/ in house   Lifestyle   Autonomy Pt reports I w/ ADLs at baseline w/ out use of AD w/ in house. Uses cane outside, in the community   Reciprocal Relationships Pt reports living w/ her  in [facility]. 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 Personal and environmental factors supporting performance includes independent at baseline, supportive family / spouse, access to AD, able to have first floor set- up; barriers include difficulty managing stairs, multi level home. Subjective   Subjective "I always wear my shoes even to go to the bathroom in the middle of the night"   ADL   Where Assessed Edge of bed   Eating Assistance 7  Independent   Grooming Assistance 5  Supervision/Setup   Grooming Deficit Setup   UB Bathing Assistance Unable to assess  (anticipate mod I based on fxal obs skills, clinical judgement)   LB Bathing Assistance Unable to assess  (anticipate S based on fxal obs skills, clinical judgement)   UB Dressing Assistance 6  Modified independent   UB Dressing Deficit Setup; Increased time to complete   LB Dressing Assistance 6  Modified independent   LB Dressing Deficit Setup; Increased time to complete  (don sneakers w/ + time after set- up) Toileting Assistance  Unable to assess   Bed Mobility   Supine to Sit 6  Modified independent   Additional items Assist x 1;Bedrails; Increased time required  (to pt's R)   Sit to Supine Unable to assess   Additional Comments Pt seated at EOB post eval w/ needs met, call bell in reach   Transfers   Sit to Stand 6  Modified independent   Additional items Assist x 1; Increased time required   Stand to Sit 6  Modified independent   Additional items Assist x 1; Increased time required   Functional Mobility   Functional Mobility 5  Supervision   Additional Comments engaged in household distance functional mobility using cane w/ S. Additional items SPC   Balance   Static Sitting Normal   Dynamic Sitting Fair +   Static Standing Fair +   Ambulatory Fair   Activity Tolerance   Activity Tolerance Patient tolerated treatment well   Nurse Made Aware spoke w/ RN   RUE Assessment   RUE Assessment WFL   RUE Strength   RUE Overall Strength Within Functional Limits - able to perform ADL tasks with strength   LUE Assessment   LUE Assessment WFL   LUE Strength   LUE Overall Strength Within Functional Limits - able to perform ADL tasks with strength   Hand Function   Gross Motor Coordination Functional   Fine Motor Coordination Functional   Sensation   Light Touch No apparent deficits   Cognition   Overall Cognitive Status WFL   Arousal/Participation Alert; Cooperative   Attention Attends with cues to redirect   Orientation Level Oriented X4   Memory Within functional limits   Following Commands Follows multistep commands without difficulty   Comments Identified pt by full name and birthdate. Alert, oriented and able to follow directions during ADLs   Assessment   Limitation Decreased ADL status; Decreased endurance;Decreased self-care trans;Decreased high-level ADLs   Assessment Pt is an 83yo female admitted to [address] on 12/2/23 w/ worsening erythema surrounding R knee. Diagnosed cellulitis of R LE.  Pt reports living w/ her spouse in 2 SH w/ primarily first floor set- up. Pt reports I w/ ADL/ IADL using cane in community. Upon eval, pt alert and oriented. Pt required  set-up, + time to complete UBD/LBD w/ mod I, mod I bed mobility, mod I sit <> stand, S functional mobility using cane. Pt is completing ADLs below baseline level of I w/ decreased endurance, standing tolerance / balance. Pt would benefit from OT In acute care to maximize functional independence. No (OT)rehab needs when medically stable for discharge from acute care. Will continue to follow   Goals   Patient Goals Pt stated that she would like to return home and finish decorating   Plan   Treatment Interventions ADL retraining;Functional transfer training; Endurance training;UE strengthening/ROM; Equipment evaluation/education;Patient/family training; Compensatory technique education;Continued evaluation; Energy conservation; Activityengagement   Goal Expiration Date 12/11/23   OT Treatment Day 0  (Monday 12/4/23)   OT Frequency 1-2x/wk   Discharge Recommendation   Rehab Resource Intensity Level, OT No post-acute rehabilitation needs   Equipment Recommended Shower/Tub chair with back ($)   AM-PAC Daily Activity Inpatient   Lower Body Dressing 3   Bathing 3   Toileting 4   Upper Body Dressing 4   Grooming 4   Eating 4   Daily Activity Raw Score 22   Daily Activity Standardized Score (Calc for Raw Score >=11) 47. 1   AM-PAC Applied Cognition Inpatient   Following a Speech/Presentation 4   Understanding Ordinary Conversation 4   Taking Medications 4   Remembering Where Things Are Placed or Put Away 4   Remembering List of 4-5 Errands 4   Taking Care of Complicated Tasks 4   Applied Cognition Raw Score 24   Applied Cognition Standardized Score 62.21   Barthel Index   Feeding 10   Bathing 0   Grooming Score 5   Dressing Score 5   Bladder Score 10   Bowels Score 10   Toilet Use Score 5   Transfers (Bed/Chair) Score 10   Mobility (Level Surface) Score 10   Stairs Score 5   Barthel Index Score 70 Additional Treatment Session   Start Time 1402   End Time 1411   Treatment Assessment Pt seen for skilled OT Tx session day 1 following eval. Pt seated at EOB and agreeable / motivated to participate reporting she would like to use the bathroom. Pt motivated to walk w/ out use of device to see how she is feeling as she does not use device inside home. Pt performed sit to stand from EOB w/ mod I. Pt engaged in functional mobility to / from bathroom w/ S w/ out use of AD or LOB. Pt performed toilet transfer w/ mod I. Pt engaged in grooming to wash / dry hands w/ S standing at sink. Continue to recommend no (OT) rehab needs when medically stable. Will conitnue to follow   Additional Treatment Day 1  (Monday 12/4/23)   End of Consult   Education Provided Yes  (role of OT)   Patient Position at End of Consult All needs within reach   Nurse Communication Nurse aware of consult   Licensure   [address] [place] [address] Number  Nemesio Tavares, OTR/L [id number]         The patient's raw score on the AM-PAC Daily Activity Inpatient Short Form is 22. A raw score of greater than or equal to 19 suggests the patient may benefit from discharge to home. Please refer to the recommendation of the Occupational Therapist for safe discharge planning.     Pt goals to be met by 12/11/23 to max I w/ ADLs and IADLs to return home to [facility] includes:    -Pt will complete bed mobility supine <> sit independently in preparation for ADLs    -Pt will consistently complete functional transfers to bed, chair, and toilet using LRAD, DME as needed w/ mod I    -Pt will consistently engage in functional mobility using LRAD as needed household distances w/ mod I    -Pt will complete LBD / UBD w/ mod I for + time w/ out rest break or sign/ symptoms of fatigue    -Pt will demonstrate improved activity and sitting tolerance OOB In chair for all meals    -Pt will demonstrate improved functional standing tolerance for at least 10 minutes w/ at least fair +balance while engaged in bathing / grooming w/ Krishna Parker, OTR/L  [id number]  [id number]

## 2023-12-04 NOTE — ASSESSMENT & PLAN NOTE
Paroxysmal atrial fibrillation status post ablation and pacemaker  Continue metoprolol.     Anticoagulation: Holding xarelto due to coagulopathy and will discuss with hematology regarding anticoagulation recommendations

## 2023-12-05 ENCOUNTER — TELEPHONE (OUTPATIENT)
Dept: HEMATOLOGY ONCOLOGY | Facility: CLINIC | Age: 83
End: 2023-12-05

## 2023-12-05 ENCOUNTER — TRANSITIONAL CARE MANAGEMENT (OUTPATIENT)
Dept: INTERNAL MEDICINE CLINIC | Facility: CLINIC | Age: 83
End: 2023-12-05

## 2023-12-05 ENCOUNTER — OFFICE VISIT (OUTPATIENT)
Dept: CARDIOLOGY CLINIC | Facility: CLINIC | Age: 83
End: 2023-12-05
Payer: MEDICARE

## 2023-12-05 VITALS
OXYGEN SATURATION: 99 % | HEIGHT: 64 IN | HEART RATE: 94 BPM | DIASTOLIC BLOOD PRESSURE: 84 MMHG | BODY MASS INDEX: 37.66 KG/M2 | WEIGHT: 220.6 LBS | SYSTOLIC BLOOD PRESSURE: 117 MMHG

## 2023-12-05 DIAGNOSIS — I50.32 CHRONIC DIASTOLIC CHF (CONGESTIVE HEART FAILURE) (HCC): ICD-10-CM

## 2023-12-05 DIAGNOSIS — I48.19 PERSISTENT ATRIAL FIBRILLATION (HCC): ICD-10-CM

## 2023-12-05 DIAGNOSIS — I35.0 AORTIC STENOSIS, SEVERE: ICD-10-CM

## 2023-12-05 DIAGNOSIS — E66.01 SEVERE OBESITY (BMI 35.0-39.9) WITH COMORBIDITY (HCC): ICD-10-CM

## 2023-12-05 DIAGNOSIS — I10 ESSENTIAL HYPERTENSION: ICD-10-CM

## 2023-12-05 DIAGNOSIS — Z09 HOSPITAL DISCHARGE FOLLOW-UP: Primary | ICD-10-CM

## 2023-12-05 LAB
QRS AXIS: 38 DEGREES
QRSD INTERVAL: 116 MS
QT INTERVAL: 418 MS
QTC INTERVAL: 479 MS
T WAVE AXIS: -72 DEGREES
VENTRICULAR RATE: 79 BPM

## 2023-12-05 PROCEDURE — 99214 OFFICE O/P EST MOD 30 MIN: CPT | Performed by: NURSE PRACTITIONER

## 2023-12-05 RX ORDER — METOPROLOL TARTRATE 50 MG/1
TABLET, FILM COATED ORAL
Start: 2023-12-05

## 2023-12-05 NOTE — PROGRESS NOTES
Cardiology  Office Visit Note  Theresa Mar   82 y.o.   female   MRN: [id number]  [facility], [place]  [address] [facility] [address]  [phone]  [phone]    PCP: Annie Coats MD  Cardiologist: Dr. Siva Donaldson                Summary of recommendations  Follow-up with hematology. She will remain off anticoagulation, per hematology's recommendations. We will try to move up an appointment. Increase metoprolol to tartrate to 100 mg ever in the a.m., 50 mg in the p.m. Follow up will be scheduled with Dr Siva Donaldson . Colon Ca screenin2020 , up-to-date          Assessment/plan  Persistent A fib   currently on metoprolol tartrate 75 mg twice a day. Will increase to 100 mg in the am, 50 mg in the pm given multiple VHR on pacer interrogation  Was On oral anticoagulation with Xarelto 20 mg daily. She was taken off during her recent hospital visit given an elevated INR/PT. She was evaluated by hematology who recommended she remain off anticoagulation until reevaluated by hematology. Not entirely clear why an INR /PT were drawn. Mod- Severe aortic stenosis. Work-up is in process for TAVR  [facility] - no significant CAD  CTS eval is deferred until leg wound is more healed  S/P MDT PPM    Interrogation  23. She has 7 months of battery left, is at [facility].  29%. Multiple VHR episodes  Pulmonary hypertension. On torsemide 20 mg daily  Hypertension, essential.  /84  on metoprolol tartrate 75 mg q.12, loop diuretic. Will increase her beta-blocker given her pacer interrogation, as above  Hyperlipidemia, OFF atorvastatin 40 mg daily. Lower extremity wounds being followed carefully by the wound care center at [facility]. Breast cancer  History of Carrero's esophagus  Cardiac testing  TTE 21. EF  55%. No definitive regional wall motion abnormality seen. A cannot be excluded on the basis of this study.   RV the upper limit of normal.  Borderline RV systolic function. Moderate left atrial enlargement. Marked right enlargement. Mild-to-moderate MR. Aortic valve is probably trileaflet. Leaflets show thickening with mild calcification and reduced cuspal separation. Trace AI. Mild-to-moderate aortic stenosis. Mean/peak gradient is 16/32 mm Hg, GABRIEL is 1.1 cm2, DVI is 0.33. Moderate severe TR. There is moderate pulmonary hypertension with RV systolic pressure of 49-45 mm HgCOMPARISONS:Compared to previous echo from 11/28/2018, valvular heart disease has increased along with PA pressure   TTE 6/1/23 LVEF 55%. Motion is normal.  RV cavity mildly dilated. Moderate STEFANIA. Mod Severe AS. Mild MR. Mild to moderate TR. LHC  9/5/23. No significant coronary atherosclerosis. The RCA ostium is relatively low in the right sinus. YARA Gramajo is a 79 yo female with chronic atrial fibrillation, mild-to-moderate aortic stenosis, essential hypertension. She had a prior pacemaker. She follows with Dr. Nathan Daniels. She has been stable on anticoagulation with warfarin. She was last seen in the office in February 2022; stable. She was having some fatigue and arthritis sx.    5/23/22  Acute visit   Went to the ED 4/17/22 Had an episode of intense chest pounding 10 min. Recurred x 3. Symptoms occurred in the middle of night. After workup she was provided a prescription for Protonix  Her EKG showed AFib 91 beats per minute. CMP unremarkable, lipase normal, magnesium 2.3, CBC stable, INR 1.79, proBNP 1080, troponin 11, chest x-ray no active disease, troponin 10. Venous duplex 4/29/22:  No DVT  ROS:  She has had no recurrence of the chest pounding. She has significant right lower extremity edema ever since her knee replacement in March. She has some swelling on the left side but not as pronounced. Admits to LU. She lives in a house with steps.  Occasional palpitations  EKG atrial fibrillation, 97 beats per minute with frequent ventricular paced beats  Pacer interrogation 5/18/22:  AFib with multiple VHR episodes, VHR  /104  Assessment:  Volume overload  Plan:  Increase Lasix to 40 b.i.d. X1 week, then decrease to daily  Follow-up labs 2 weeks-    Interval history  8/9/22; 1/20/23; OV Dr Mendoza [place]    7/28/23 Ov Dr Mendoza [place]  F/U A-fib  Dyspnea with moderate exertion  Switched from warfarin to Xarelto    9/5/23 LHC:   No significant CAD-.  out pt W/U for TAVR    Adm 9/5-9/8/23  SLB  Patient hit her LLE on the car door 2 weeks ago and developed a wound at that area. evaluated in ED 8/26--suspected to have infected hematoma which was aspirated per ED note and was placed on clindamycin. Patient presented after [facility] for left lateral leg eschar. 9/6: S/p bedside I&D with placement of wound VAC with podiatry. Noted to have large hematoma with no purulence noted. Arterial duplex: No significant arterial disease  Venous duplex: No signs of DVT  Abx DCd  BC neg  Wound culture-with contaminant      9/10/23 ED visit, referred by VNA  VAC removed, had bleeding; pressure applied sent to ED    9/13/23  OV Podiatry  At this time patient will continue with VNA. She will watch for signs of infection. referred to vascular surgery. 9/15/23 ED visit wound check, referred by VNA. LLE wound Bleeding and red  Rx clindamycin 20 mg 4 times daily  Referred To [address] [facility] wound care     9/19/23  wound debrided at wound care center    9/26/23  Debrided at wound care center. Notes indicate wound is healing  No signs of infection   recommend follow-up in 2 weeks    9/27/23  (PMH: atrial fibrillation on Xarelto, CHF, type 2 diabetes mellitus with neuropathy. CAD, severe AS, recent [facility] work-up of TAVR recent left lower extremity injury. She has venous insufficiency, chronic edema. Recently treated for infected hematoma discharged with a wound VAC . podiatry referred her to vascular surgery)  Hospital follow-up. From a cardiac perspective, she is stable.   She denies chest pain, worsening shortness of breath, palpitations. She is adherent to her medicines taking a loop diuretic. She is frustrated by the healing of her leg wounds. Today, her legs are wrapped they were not unwrapped  She appears euvolemic  I reiterated the importance of a salt restricted diet and reviewed anatomy of aortic stenosis  For now we will continue the current plan, she is on anticoagulant. She return in a few weeks to see Dr. Milli Roper history  10/20/23 OV Dr Daniel Betancur  severe AS: TAVR workup started and on hold due to lower extremity wound that needs to heal first.   Persistent AF: Continue with metoprolol for rate control and continue AC with xarelto. S/P PPM implant. Continue device checks  Chronic diastolic CHF: in setting of severe AS. Continue torsemide. Adm 12/2-12/4/23  ELIZA Barillas  CC: progressive redness of the right lower extremity. Initially placed on vancomycin by the primary team, later [facility] by ID  Afebrile. Normal WBC  INR/PT elevated. Unclear why they were checked. Results from last 7 days   Lab Units 12/04/23  0426 12/03/23  0503 12/02/23  1126   INR   1.87* 2.34* 6.05*   PTT seconds 55* 47* 68*  68*      + RLE bruising. No bleeding problems  Seen by hematology 12/4/23:Her anticoagulation remains on hold, until OP hematology workup. LE dopplers- no DVT    Her antibiotic regimen was established by infectious disease    Her cellulitis is much improved. She will be discharged with oral antibiotics. She did have some slight positional dizziness when walking to the bathroom however would resolve after 1 minute of rest-- felt likely vertiginous. Not seen by cardiology  DC wt 221 lb  Dc diuretic torsemide 20 mg/d       12/5/23  PMH:   PAF. HFpEF. AS. PPM.  Obesity. chronic lower extremity edema, chronic lower extremity traumatic wounds followed by the wound management center. Undergoes periodic debridement, also managed with Unna boots.      Urgent visit. Placed on my schedule at 1115 this am, to be seen this afternoon  She tells me she was discharged from the hospital last night. Her right leg is improving, but when the boot is off. She has a wound covered with gauze, her left lower extremity is wrapped. She is following closely with the wound center. She has some shortness of breath. She denies palpitations. She is concerned that she is now off anticoagulation until she can get back into hematology. Given her recent pacemaker interrogation showing multiple VHR, we will increase metoprolol to 100 mg a morning, continue 50 mg in the p.m. I will also reach out to hematology to see if we can get her appointment moved up. She will return to see her cardiologist in a short interval.  She is due for a generator change for her battery in about 7 months. She will need close interrogations          I have spent 45 minutes with Patient and family today in which greater than 50% of this time was spent in counseling/coordination of care regarding Intructions for management, Patient and family education, Importance of tx compliance and Risk factor reductions. Assessment  Diagnoses and all orders for this visit:    Hospital discharge follow-up    Persistent atrial fibrillation (HCC)    Chronic diastolic CHF (congestive heart failure) (HCC)    Aortic stenosis, severe    Essential hypertension    Severe obesity (BMI 35.0-39. 9) with comorbidity [facility])          Past Medical History:   Diagnosis Date    Arthritis     Atrial fibrillation ([address])     Carrero's esophagus     last assessed: 1/23/2018    BRCA1 negative     BRCA2 negative     Breast cancer ([address]) 2006    stage 1 (left), given adjuvant radiation with Arimidex x 5 years    Cancer [facility]) 2006    Left Breast, Lumpectomy    Cataract     last assessed: 3/11/2014    Chronic diastolic CHF (congestive heart failure) ([address]) 11/21/2022    Dysplasia of toenail     last assessed: 8/29/2017    Esophageal reflux GERD (gastroesophageal reflux disease)     Gross hematuria     last assessed: 2/19/2015    Hematuria     Hiatal hernia     History of radiation therapy     Hypertension     Irregular heart beat     AFIB    Mixed sensory-motor polyneuropathy     Neuropathy     Obesity     Pacemaker     Paroxysmal atrial fibrillation (HCC)     Peripheral neuropathy     Rectal bleeding     Restless leg syndrome     Shortness of breath     last assessed: 1/11/2016       Review of Systems   Constitutional: Negative for chills and malaise/fatigue. Cardiovascular:  Negative for chest pain, claudication, cyanosis, dyspnea on exertion, irregular heartbeat, leg swelling, near-syncope, orthopnea, palpitations, paroxysmal nocturnal dyspnea and syncope. Lower extremity leg wounds   Respiratory:  Positive for shortness of breath. Negative for cough. Musculoskeletal:  Positive for arthritis. Gastrointestinal:  Negative for heartburn and nausea. Neurological:  Negative for dizziness, focal weakness, headaches, light-headedness and weakness. All other systems reviewed and are negative. Allergies   Allergen Reactions    Latex      Added based on information entered during case entry, please review and add reactions, type, and severity as needed    Duloxetine Hcl Other (See Comments)     Facial pins and needles sensation    Erythromycin Rash    Levofloxacin Other (See Comments)     Muscular aches    Penicillins Rash    Savella [Milnacipran] Rash    Sulfa Antibiotics Rash     .     Current Outpatient Medications:     ascorbic acid (VITAMIN C) 500 MG tablet, Take 500 mg by mouth 2 (two) times a day, Disp: , Rfl:     Calcium Acetate, Phos Binder, (CALCIUM ACETATE PO), Take by mouth daily  , Disp: , Rfl:     cefadroxil (DURICEF) 500 mg capsule, Take 1 capsule (500 mg total) by mouth every 12 (twelve) hours for 3 days Do not start before December 5, 2023., Disp: 6 capsule, Rfl: 0    clobetasol (TEMOVATE) 0.05 % ointment, Apply once weekly to the affected area, Disp: 30 g, Rfl: 3    famotidine (PEPCID) 40 MG tablet, TAKE 1 TABLET BY MOUTH EVERY DAY, Disp: 90 tablet, Rfl: 1    fluticasone (FLONASE) 50 mcg/act nasal spray, SPRAY 1 SPRAY INTO EACH NOSTRIL EVERY DAY, Disp: 48 mL, Rfl: 3    gabapentin (NEURONTIN) 800 mg tablet, TAKE 1 TABLET BY MOUTH FOUR TIMES A DAY, Disp: 360 tablet, Rfl: 1    loratadine (CLARITIN) 10 mg tablet, Take 10 mg by mouth daily, Disp: , Rfl:     magnesium 30 MG tablet, Take 30 mg by mouth in the morning, Disp: , Rfl:     metoprolol tartrate (LOPRESSOR) 50 mg tablet, TAKE 1 AND 1/2 TABLETS BY MOUTH 2 TIMES A DAY, Disp: 270 tablet, Rfl: 3    multivitamin (THERAGRAN) TABS, Take 1 tablet by mouth daily, Disp: , Rfl:     pantoprazole (PROTONIX) 20 mg tablet, Take 20 mg by mouth daily, Disp: , Rfl:     pramipexole (MIRAPEX) 0.5 mg tablet, TAKE 2 FULL TABLETS TWICE A DAY AT 5:00 P. M. AND 10:00 P.M., Disp: 360 tablet, Rfl: 1    torsemide (DEMADEX) 20 mg tablet, TAKE 1 TABLET BY MOUTH EVERY DAY, Disp: 90 tablet, Rfl: 3  No current facility-administered medications for this visit.         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      Comment: retired   Tobacco Use    Smoking status: Former     Packs/day: 1.00     Years: 25.00     Total pack years: 25.00     Types: Cigarettes     Quit date: 1980     Years since quittin.2    Smokeless tobacco: Never    Tobacco comments:     Quit over 30 years ago; quit age 41   Vaping Use    Vaping Use: Never used   Substance and Sexual Activity    Alcohol use: Not Currently    Drug use: No    Sexual activity: Not on file   Other Topics Concern    Not on file   Social History Narrative         Social Determinants of Health     Financial Resource Strain: Low Risk  (11/21/2022)    Overall Financial Resource Strain (CARDIA)     Difficulty of Paying Living Expenses: Not very hard   Food Insecurity: No Food Insecurity (9/6/2023)    Hunger Vital Sign     Worried About Running Out of Food in the Last Year: Never true     Ran Out of Food in the Last Year: Never true   Transportation Needs: No Transportation Needs (9/6/2023)    PRAPARE - Transportation     Lack of Transportation (Medical): No     Lack of Transportation (Non-Medical): No   Physical Activity: Not on file   Stress: Not on file   Social Connections: Not on file   Intimate Partner Violence: Not on file   Housing Stability: Unknown (9/6/2023)    Housing Stability Vital Sign     Unable to Pay for Housing in the Last Year: No     Number of Places Lived in the Last Year: Not on file     Unstable Housing in the Last Year: No       Family History   Problem Relation Age of Onset    Hypertension Mother     Heart disease Father     Aneurysm Father     Coronary artery disease Father         in his 76s with aneurysm    Aortic aneurysm Father         abdominal    Scleroderma Sister     Breast cancer Sister 76    Hypertension Sister     Cancer Sister     No Known Problems Son     No Known Problems Son     Testicular cancer Son 39    Thyroid cancer Son 45    Colon cancer Maternal Aunt     Colon cancer Maternal Aunt     Breast cancer Other 48        kaylee's daughter    Alcohol abuse Neg Hx     Substance Abuse Neg Hx     Mental illness Neg Hx     Depression Neg Hx        Physical Exam  Vitals and nursing note reviewed. Constitutional:       General: She is not in acute distress. Appearance: She is obese. She is not diaphoretic. HENT:      Head: Normocephalic and atraumatic. Eyes:      Conjunctiva/sclera: Conjunctivae normal.   Cardiovascular:      Rate and Rhythm: Normal rate and regular rhythm. Pulses: Intact distal pulses. Heart sounds: Murmur heard.       Harsh midsystolic murmur is present with a grade of 3/6 at the upper right sternal border radiating to the neck. Pulmonary:      Effort: Pulmonary effort is normal.      Breath sounds: No rales. Abdominal:      General: Bowel sounds are normal.      Palpations: Abdomen is soft. Musculoskeletal:         General: Normal range of motion. Cervical back: Normal range of motion and neck supple. Right lower leg: No edema. Left lower leg: No edema. Comments: Appears euvolemic. Her legs are wrapped, they were not unwrapped for assessment. She is followed by the wound center. She uses a cane as needed   Skin:     General: Skin is warm and dry. Comments: Left lower extremity is wrapped. Her right lower extremity is slightly warm, erythematous. There is an outline of the erythema, with a marker. Edema is controlled. Neurological:      Mental Status: She is alert and oriented to person, place, and time. Vitals: There were no vitals taken for this visit. Wt Readings from Last 3 Encounters:   12/02/23 100 kg (221 lb)   10/20/23 102 kg (225 lb 8 oz)   10/11/23 103 kg (228 lb)         Labs & Results:  Lab Results   Component Value Date    WBC 8.03 12/04/2023    HGB 11.9 12/04/2023    HCT 37.4 12/04/2023    MCV 98 12/04/2023     12/04/2023     No results found for: "BNP"  No components found for: "CHEM"  Troponin I   Date Value Ref Range Status   06/06/2019 <0.02 <=0.04 ng/mL Final     Comment:     3Autovalidation override  [employer] Chemistry analyzer 99% cutoff is > 0.04 ng/mL in network labs     o cTnI 99% cutoff is useful only when applied to patients in the clinical setting of myocardial ischemia   o cTnI 99% cutoff should be interpreted in the context of clinical history, ECG findings and possibly cardiac imaging to establish correct diagnosis. o cTnI 99% cutoff may be suggestive but clearly not indicative of a coronary event without the clinical setting of myocardial ischemia.      06/06/2019 <0.02 <=0.04 ng/mL Final     Comment:     3Autovalidation override  Siemens Chemistry analyzer 99% cutoff is > 0.04 ng/mL in network labs     o cTnI 99% cutoff is useful only when applied to patients in the clinical setting of myocardial ischemia   o cTnI 99% cutoff should be interpreted in the context of clinical history, ECG findings and possibly cardiac imaging to establish correct diagnosis. o cTnI 99% cutoff may be suggestive but clearly not indicative of a coronary event without the clinical setting of myocardial ischemia. 2018 <0.02 <=0.04 ng/mL Final     Comment:     3Autovalidation override  Siemens Chemistry analyzer 99% cutoff is > 0.04 ng/mL in network labs     o cTnI 99% cutoff is useful only when applied to patients in the clinical setting of myocardial ischemia   o cTnI 99% cutoff should be interpreted in the context of clinical history, ECG findings and possibly cardiac imaging to establish correct diagnosis. o cTnI 99% cutoff may be suggestive but clearly not indicative of a coronary event without the clinical setting of myocardial ischemia. Results for orders placed during the hospital encounter of 21    Echo complete with contrast if indicated    [address]  [phone]    Transthoracic Echocardiogram  2D, M-mode, Doppler, and Color Doppler    Study date:  04-May-2021    Patient: Juanita Peters  MR number: [id number]  Account number: [de-identified]  : 1940  Age: 68 years  Gender: Female  Status: Outpatient  Location: Echo lab  Height: 64.5 in  Weight: 223.5 lb  BP: 132/ 80 mmHg    Indications: A.Fib.     Diagnoses: I48.1 - Atrial flutter    Sonographer:  MORRIS Gibson  Primary Physician:  Alanna Arredondo MD  Referring Physician:  Angela Tolentino MD  Group:  [facility]  Interpreting Physician:  Paolo Mcdermott MD    SUMMARY    LEFT VENTRICLE:  Normal left ventricular chamber dimension  There is mild concentric left ventricular  Left ventricular ejection fraction is about 55%  No definite regional wall motion abnormality seen, although it cannot be completely excluded from this study. Indeterminate diastolic function because of underlying mitral annular calcification    RIGHT VENTRICLE:  RV size is at upper limit of normal  Borderline RV systolic function  Device lead visualized in RV cavity    LEFT ATRIUM:  Moderately dilated left atrium    RIGHT ATRIUM:  Markedly dilated right atrium  Device lead visualized in right atrium    MITRAL VALVE:  Mildly thickened mitral valve leaflets with moderate annular calcification. There is mild to moderate mitral regurgitation  There was no evidence of mitral stenosis    AORTIC VALVE:  Aortic valve is probably trileaflet. Leaflets show thickening with mild calcification and reduced cuspal separation. There is trace aortic regurgitation  There is mild to moderate aortic stenosis  Mean/peak gradient is 16/32 mm Hg, GABRIEL is 1.1 cm2, DVI is 0.33    TRICUSPID VALVE:  Moderate to severe tricuspid regurgitation. There is moderate pulmonary hypertension with RV systolic pressure of 22-01 mm Hg    PULMONIC VALVE:  There is mild pulmonary regurgitation    COMPARISONS:  Compared to previous echo from 11/28/2018, valvular heart disease has increased along with PA pressure    HISTORY: PRIOR HISTORY: Breast cancer,Carrero's esophagus,gastroesophageal reflux disease,pacemaker,diabetes,HTN. PROCEDURE: The procedure was performed in the echo lab. This was a routine study. The transthoracic approach was used. The study included complete 2D imaging, M-mode, complete spectral Doppler, and color Doppler. The heart rate was 88 bpm,  at the start of the study. Images were obtained from the parasternal, apical, subcostal, and suprasternal notch acoustic windows. Image quality was adequate.     LEFT VENTRICLE: Normal left ventricular chamber dimension  There is mild concentric left ventricular  Left ventricular ejection fraction is about 55%  No definite regional wall motion abnormality seen, although it cannot be completely excluded from this study. Indeterminate diastolic function because of underlying mitral annular calcification    RIGHT VENTRICLE: RV size is at upper limit of normal  Borderline RV systolic function  Device lead visualized in RV cavity    LEFT ATRIUM: Moderately dilated left atrium    RIGHT ATRIUM: Markedly dilated right atrium  Device lead visualized in right atrium    MITRAL VALVE: Mildly thickened mitral valve leaflets with moderate annular calcification. There is mild to moderate mitral regurgitation  There was no evidence of mitral stenosis    AORTIC VALVE: Aortic valve is probably trileaflet. Leaflets show thickening with mild calcification and reduced cuspal separation. There is trace aortic regurgitation  There is mild to moderate aortic stenosis  Mean/peak gradient is 16/32 mm Hg, GABRIEL is 1.1 cm2, DVI is 0.33    TRICUSPID VALVE: Moderate to severe tricuspid regurgitation. There is moderate pulmonary hypertension with RV systolic pressure of 36-66 mm Hg    PULMONIC VALVE: There is mild pulmonary regurgitation    AORTA: Normal aortic root diameter    SYSTEM MEASUREMENT TABLES    2D  EF (Teich): 40.48 %  %FS: 19.79 %  Ao Diam: 3.13 cm  EDV(Teich): 114.46 ml  ESV(Teich): 68.13 ml  IVSd: 1.16 cm  LA Area: 29.65 cm2  LA Diam: 4.68 cm  LVIDd: 4.93 cm  LVIDs: 3.95 cm  LVOT Diam: 1.89 cm  LVPWd: 1.11 cm  RA Area: 28.31 cm2  RVIDd: 3.84 cm  SV (Teich): 46.33 ml    CW  AV Env. Ti: 342.53 ms  AV VTI: 60.06 cm  AV Vmax: 2.84 m/s  AV Vmean: 1.75 m/s  AV maxP.25 mmHg  AV meanP.09 mmHg  TR Vmax: 3.73 m/s  TR maxP.66 mmHg    MM  TAPSE: 1.19 cm    PW  MV E/A Ratio: 3.63  E' Sept: 0.08 m/s  E/E' Sept: 16.34  LVOT Env. Ti: 296.86 ms  LVOT VTI: 19.53 cm  LVOT Vmax: 1.07 m/s  LVOT Vmean: 0.66 m/s  LVOT maxP.59 mmHg  LVOT meanP.17 mmHg  MV A Mynor: 0.35 m/s  MV [place]: 7.43 m/s2  MV DecT: 171.81 ms  MV E Mynor: 1.28 m/s  MV PHT: 49.82 ms  MVA By PHT: 4.42 cm2    [facility] Accredited Echocardiography Laboratory    Prepared and electronically signed by    Bonifacio Frances MD  Signed 04-May-2021 14:07:15    No results found for this or any previous visit. This note was completed in part utilizing [employer] direct voice recognition software. Grammatical errors, random word insertion, spelling mistakes, and incomplete sentences may be an occasional consequence of the system secondary to software limitations, ambient noise and hardware issues. At the time of dictation, efforts were made to edit, clarify and /or correct errors. Please read the chart carefully and recognize, using context, where substitutions have occurred.   If you have any questions or concerns about the context, text or information contained within the body of this dictation, please contact myself, the provider, for further clarification

## 2023-12-05 NOTE — TELEPHONE ENCOUNTER
Discussed patient with Dr Delaney Real is managed by cardiologist for a fib  The present appt time frame is ok  Left voicemail for patient to call my TEAMs number to discuss

## 2023-12-05 NOTE — TELEPHONE ENCOUNTER
Patient Call    Who are you speaking with? Patient    If it is not the patient, are they listed on an active communication consent form? N/A   What is the reason for this call? Patient calling in regards to scheduling a hospital follow up appointment with Dr. Brenna Mcginnis. Patient saw Dr. Brenna Mcginnis in the hospital.  Patient was scheduled for 1/31/24 at 9:40am.  Patient states that her hospital discharge paperwork says she should be seen by Hematology within 2 weeks. Patient would like a call back in regards to her hospital follow up appointment. Does this require a call back? Yes   If a call back is required, please list best call back number [phone] phone  [phone]   If a call back is required, advise that a message will be forwarded to their care team and someone will return their call as soon as possible. Did you relay this information to the patient?  Yes

## 2023-12-05 NOTE — PROGRESS NOTES
You were on Keflex (cephalexin) in the past and the antibiotic they gave you is in a similar class. It is not penicillin.  The antibiotic was recommended by the specialist.

## 2023-12-05 NOTE — LETTER
2023     Giacomo Galo MD  [facility]  [address] [address]    Patient: Sony Ortiz   YOB: 1940   Date of Visit: 2023       Dear Dr. [place]: Thank you for referring Georgina Bustos to me for evaluation. Below are my notes for this consultation. If you have questions, please do not hesitate to call me. I look forward to following your patient along with you. Sincerely,        RICKY Esteban        CC: MD Cesilia Wilkins, [address] [place] [address]  2023  3:01 PM  Sign when Signing Visit  Cardiology  Office Visit Note  Sony Ortiz   82 y.o.   female   MRN: [id number]  [facility]  [address]  [phone]  [phone]    PCP: Giacomo Galo MD  Cardiologist: Dr. Fadi Weller                Summary of recommendations  Follow-up with hematology. She will remain off anticoagulation, per hematology's recommendations. We will try to move up an appointment. Increase metoprolol to tartrate to 100 mg ever in the a.m., 50 mg in the p.m. Follow up will be scheduled with Dr Fadi Weller . Colon Ca screenin2020 , up-to-date          Assessment/plan  Persistent A fib   currently on metoprolol tartrate 75 mg twice a day. Will increase to 100 mg in the am, 50 mg in the pm given multiple VHR on pacer interrogation  Was On oral anticoagulation with Xarelto 20 mg daily. She was taken off during her recent hospital visit given an elevated INR/PT. She was evaluated by hematology who recommended she remain off anticoagulation until reevaluated by hematology. Not entirely clear why an INR /PT were drawn. Mod- Severe aortic stenosis. Work-up is in process for TAVR  [facility] - no significant CAD  CTS eval is deferred until leg wound is more healed  S/P MDT PPM    Interrogation  23. She has 7 months of battery left, is at [facility].  29%.   Multiple VHR episodes  Pulmonary hypertension. On torsemide 20 mg daily  Hypertension, essential.  /84  on metoprolol tartrate 75 mg q.12, loop diuretic. Will increase her beta-blocker given her pacer interrogation, as above  Hyperlipidemia, OFF atorvastatin 40 mg daily. Lower extremity wounds being followed carefully by the wound care center at [facility]. Breast cancer  History of Carrero's esophagus  Cardiac testing  TTE 5/4/21. EF  55%. No definitive regional wall motion abnormality seen. A cannot be excluded on the basis of this study. RV the upper limit of normal.  Borderline RV systolic function. Moderate left atrial enlargement. Marked right enlargement. Mild-to-moderate MR. Aortic valve is probably trileaflet. Leaflets show thickening with mild calcification and reduced cuspal separation. Trace AI. Mild-to-moderate aortic stenosis. Mean/peak gradient is 16/32 mm Hg, GABRIEL is 1.1 cm2, DVI is 0.33. Moderate severe TR. There is moderate pulmonary hypertension with RV systolic pressure of 46-53 mm HgCOMPARISONS:Compared to previous echo from 11/28/2018, valvular heart disease has increased along with PA pressure   TTE 6/1/23 LVEF 55%. Motion is normal.  RV cavity mildly dilated. Moderate STEFANIA. Mod Severe AS. Mild MR. Mild to moderate TR. LHC  9/5/23. No significant coronary atherosclerosis. The RCA ostium is relatively low in the right sinus. YARA Choi is a 81 yo female with chronic atrial fibrillation, mild-to-moderate aortic stenosis, essential hypertension. She had a prior pacemaker. She follows with Dr. Krysten Jolly. She has been stable on anticoagulation with warfarin. She was last seen in the office in February 2022; stable. She was having some fatigue and arthritis sx.    5/23/22  Acute visit   Went to the ED 4/17/22 Had an episode of intense chest pounding 10 min. Recurred x 3. Symptoms occurred in the middle of night.   After workup she was provided a prescription for Protonix  Her EKG showed AFib 91 beats per minute. CMP unremarkable, lipase normal, magnesium 2.3, CBC stable, INR 1.79, proBNP 1080, troponin 11, chest x-ray no active disease, troponin 10. Venous duplex 4/29/22:  No DVT  ROS:  She has had no recurrence of the chest pounding. She has significant right lower extremity edema ever since her knee replacement in March. She has some swelling on the left side but not as pronounced. Admits to [facility]. She lives in a house with steps. Occasional palpitations  EKG atrial fibrillation, 97 beats per minute with frequent ventricular paced beats  Pacer interrogation 5/18/22:  AFib with multiple VHR episodes, VHR  /104  Assessment:  Volume overload  Plan:  Increase Lasix to 40 b.i.d. X1 week, then decrease to daily  Follow-up labs 2 weeks-    Interval history  8/9/22; 1/20/23; OV Dr Dc Cortes    7/28/23 Ov Dr Dc Cortes  F/U A-fib  Dyspnea with moderate exertion  Switched from warfarin to Xarelto    9/5/23 LHC:   No significant CAD-.  out pt W/U for TAVR    Adm 9/5-9/8/23  SLB  Patient hit her LLE on the car door 2 weeks ago and developed a wound at that area. evaluated in ED 8/26--suspected to have infected hematoma which was aspirated per ED note and was placed on clindamycin. Patient presented after [facility] for left lateral leg eschar. 9/6: S/p bedside I&D with placement of wound VAC with podiatry. Noted to have large hematoma with no purulence noted. Arterial duplex: No significant arterial disease  Venous duplex: No signs of DVT  Abx DCd  BC neg  Wound culture-with contaminant      9/10/23 ED visit, referred by [facility]  VAC removed, had bleeding; pressure applied sent to ED    9/13/23  OV Podiatry  At this time patient will continue with [facility]. She will watch for signs of infection. referred to vascular surgery. 9/15/23 ED visit wound check, referred by [facility].     LLE wound Bleeding and red  Rx clindamycin 20 mg 4 times daily  Referred To [facility] wound care 9/19/23  wound debrided at wound care center    9/26/23  Debrided at wound care center. Notes indicate wound is healing  No signs of infection   recommend follow-up in 2 weeks    9/27/23  (PMH: atrial fibrillation on Xarelto, CHF, type 2 diabetes mellitus with neuropathy. CAD, severe AS, recent [facility] work-up of TAVR recent left lower extremity injury. She has venous insufficiency, chronic edema. Recently treated for infected hematoma discharged with a wound VAC . podiatry referred her to vascular surgery)  Hospital follow-up. From a cardiac perspective, she is stable. She denies chest pain, worsening shortness of breath, palpitations. She is adherent to her medicines taking a loop diuretic. She is frustrated by the healing of her leg wounds. Today, her legs are wrapped they were not unwrapped  She appears euvolemic  I reiterated the importance of a salt restricted diet and reviewed anatomy of aortic stenosis  For now we will continue the current plan, she is on anticoagulant. She return in a few weeks to see Dr. Ora Haque history  10/20/23 OV Dr Kimberly Moncada  severe AS: TAVR workup started and on hold due to lower extremity wound that needs to heal first.   Persistent AF: Continue with metoprolol for rate control and continue AC with xarelto. S/P PPM implant. Continue device checks  Chronic diastolic CHF: in setting of severe AS. Continue torsemide. Adm 12/2-12/4/23  ELIZA Barillas  CC: progressive redness of the right lower extremity. Initially placed on vancomycin by the primary team, later [facility] by ID  Afebrile. Normal WBC  INR/PT elevated. Unclear why they were checked. Results from last 7 days   Lab Units 12/04/23  0426 12/03/23  0503 12/02/23  1126   INR   1.87* 2.34* 6.05*   PTT seconds 55* 47* 68*  68*      + RLE bruising. No bleeding problems  Seen by hematology 12/4/23:Her anticoagulation remains on hold, until OP hematology workup.   LE dopplers- no DVT    Her antibiotic regimen was established by infectious disease    Her cellulitis is much improved. She will be discharged with oral antibiotics. She did have some slight positional dizziness when walking to the bathroom however would resolve after 1 minute of rest-- felt likely vertiginous. Not seen by cardiology  DC wt 221 lb  Dc diuretic torsemide 20 mg/d       12/5/23  PMH:   PAF. HFpEF. AS. PPM.  Obesity. chronic lower extremity edema, chronic lower extremity traumatic wounds followed by the wound management center. Undergoes periodic debridement, also managed with Unna boots. Urgent visit. Placed on my schedule at 1115 this am, to be seen this afternoon  She tells me she was discharged from the hospital last night. Her right leg is improving, but when the boot is off. She has a wound covered with gauze, her left lower extremity is wrapped. She is following closely with the wound center. She has some shortness of breath. She denies palpitations. She is concerned that she is now off anticoagulation until she can get back into hematology. Given her recent pacemaker interrogation showing multiple VHR, we will increase metoprolol to 100 mg a morning, continue 50 mg in the p.m. I will also reach out to hematology to see if we can get her appointment moved up. She will return to see her cardiologist in a short interval.  She is due for a generator change for her battery in about 7 months. She will need close interrogations          I have spent 45 minutes with Patient and family today in which greater than 50% of this time was spent in counseling/coordination of care regarding Intructions for management, Patient and family education, Importance of tx compliance and Risk factor reductions.   Assessment  Diagnoses and all orders for this visit:    Hospital discharge follow-up    Persistent atrial fibrillation (HCC)    Chronic diastolic CHF (congestive heart failure) (HCC)    Aortic stenosis, severe    Essential hypertension    Severe obesity (BMI 35.0-39. 9) with comorbidity [facility])          Past Medical History:   Diagnosis Date   • Arthritis    • Atrial fibrillation ([address])    • Carrero's esophagus     last assessed: 1/23/2018   • BRCA1 negative    • BRCA2 negative    • Breast cancer ([address]) 2006    stage 1 (left), given adjuvant radiation with Arimidex x 5 years   • Cancer ([address]) 2006    Left Breast, Lumpectomy   • Cataract     last assessed: 3/11/2014   • Chronic diastolic CHF (congestive heart failure) ([address]) 11/21/2022   • Dysplasia of toenail     last assessed: 8/29/2017   • Esophageal reflux    • GERD (gastroesophageal reflux disease)    • Gross hematuria     last assessed: 2/19/2015   • Hematuria    • Hiatal hernia    • History of radiation therapy    • Hypertension    • Irregular heart beat     AFIB   • Mixed sensory-motor polyneuropathy    • Neuropathy    • Obesity    • Pacemaker    • Paroxysmal atrial fibrillation (HCC)    • Peripheral neuropathy    • Rectal bleeding    • Restless leg syndrome    • Shortness of breath     last assessed: 1/11/2016       Review of Systems   Constitutional: Negative for chills and malaise/fatigue. Cardiovascular:  Negative for chest pain, claudication, cyanosis, dyspnea on exertion, irregular heartbeat, leg swelling, near-syncope, orthopnea, palpitations, paroxysmal nocturnal dyspnea and syncope. Lower extremity leg wounds   Respiratory:  Positive for shortness of breath. Negative for cough. Musculoskeletal:  Positive for arthritis. Gastrointestinal:  Negative for heartburn and nausea. Neurological:  Negative for dizziness, focal weakness, headaches, light-headedness and weakness. All other systems reviewed and are negative.       Allergies   Allergen Reactions   • Latex      Added based on information entered during case entry, please review and add reactions, type, and severity as needed   • Duloxetine Hcl Other (See Comments)     Facial pins and needles sensation   • Erythromycin Rash   • Levofloxacin Other (See Comments)     Muscular aches   • Penicillins Rash   • Savella [Milnacipran] Rash   • Sulfa Antibiotics Rash     . Current Outpatient Medications:   •  ascorbic acid (VITAMIN C) 500 MG tablet, Take 500 mg by mouth 2 (two) times a day, Disp: , Rfl:   •  Calcium Acetate, Phos Binder, (CALCIUM ACETATE PO), Take by mouth daily  , Disp: , Rfl:   •  cefadroxil (DURICEF) 500 mg capsule, Take 1 capsule (500 mg total) by mouth every 12 (twelve) hours for 3 days Do not start before December 5, 2023., Disp: 6 capsule, Rfl: 0  •  clobetasol (TEMOVATE) 0.05 % ointment, Apply once weekly to the affected area, Disp: 30 g, Rfl: 3  •  famotidine (PEPCID) 40 MG tablet, TAKE 1 TABLET BY MOUTH EVERY DAY, Disp: 90 tablet, Rfl: 1  •  fluticasone (FLONASE) 50 mcg/act nasal spray, SPRAY 1 SPRAY INTO EACH NOSTRIL EVERY DAY, Disp: 48 mL, Rfl: 3  •  gabapentin (NEURONTIN) 800 mg tablet, TAKE 1 TABLET BY MOUTH FOUR TIMES A DAY, Disp: 360 tablet, Rfl: 1  •  loratadine (CLARITIN) 10 mg tablet, Take 10 mg by mouth daily, Disp: , Rfl:   •  magnesium 30 MG tablet, Take 30 mg by mouth in the morning, Disp: , Rfl:   •  metoprolol tartrate (LOPRESSOR) 50 mg tablet, TAKE 1 AND 1/2 TABLETS BY MOUTH 2 TIMES A DAY, Disp: 270 tablet, Rfl: 3  •  multivitamin (THERAGRAN) TABS, Take 1 tablet by mouth daily, Disp: , Rfl:   •  pantoprazole (PROTONIX) 20 mg tablet, Take 20 mg by mouth daily, Disp: , Rfl:   •  pramipexole (MIRAPEX) 0.5 mg tablet, TAKE 2 FULL TABLETS TWICE A DAY AT 5:00 P. M. AND 10:00 P.M., Disp: 360 tablet, Rfl: 1  •  torsemide (DEMADEX) 20 mg tablet, TAKE 1 TABLET BY MOUTH EVERY DAY, Disp: 90 tablet, Rfl: 3  No current facility-administered medications for this visit.         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      Comment: retired   Tobacco Use   • Smoking status: Former     Packs/day: 1.00     Years: 25.00     Total pack years: 25.00     Types: Cigarettes     Quit date: 1980     Years since quittin.2   • Smokeless tobacco: Never   • Tobacco comments:     Quit over 30 years ago; quit age 41   Vaping Use   • Vaping Use: Never used   Substance and Sexual Activity   • Alcohol use: Not Currently   • Drug use: No   • Sexual activity: Not on file   Other Topics Concern   • Not on file   Social History Narrative         Social Determinants of Health     Financial Resource Strain: Low Risk  (2022)    Overall Financial Resource Strain (CARDIA)    • Difficulty of Paying Living Expenses: Not very hard   Food Insecurity: No Food Insecurity (2023)    Hunger Vital Sign    • Worried About Running Out of Food in the Last Year: Never true    • Ran Out of Food in the Last Year: Never true   Transportation Needs: No Transportation Needs (2023)    PRAPARE - Transportation    • Lack of Transportation (Medical): No    • Lack of Transportation (Non-Medical):  No   Physical Activity: Not on file   Stress: Not on file   Social Connections: Not on file   Intimate Partner Violence: Not on file   Housing Stability: Unknown (2023)    Housing Stability Vital Sign    • Unable to Pay for Housing in the Last Year: No    • Number of Places Lived in the Last Year: Not on file    • Unstable Housing in the Last Year: No       Family History   Problem Relation Age of Onset   • Hypertension Mother    • Heart disease Father    • Aneurysm Father    • Coronary artery disease Father         in his 76s with aneurysm   • Aortic aneurysm Father         abdominal   • Scleroderma Sister    • Breast cancer Sister 76   • Hypertension Sister    • Cancer Sister    • No Known Problems Son    • No Known Problems Son    • Testicular cancer Son 39   • Thyroid cancer Son 45   • Colon cancer Maternal Aunt    • Colon cancer Maternal Aunt    • Breast cancer Other 48        kaylee's daughter   • Alcohol abuse Neg Hx    • Substance Abuse Neg Hx    • Mental illness Neg Hx    • Depression Neg Hx        Physical Exam  Vitals and nursing note reviewed. Constitutional:       General: She is not in acute distress. Appearance: She is obese. She is not diaphoretic. HENT:      Head: Normocephalic and atraumatic. Eyes:      Conjunctiva/sclera: Conjunctivae normal.   Cardiovascular:      Rate and Rhythm: Normal rate and regular rhythm. Pulses: Intact distal pulses. Heart sounds: Murmur heard. Harsh midsystolic murmur is present with a grade of 3/6 at the upper right sternal border radiating to the neck. Pulmonary:      Effort: Pulmonary effort is normal.      Breath sounds: No rales. Abdominal:      General: Bowel sounds are normal.      Palpations: Abdomen is soft. Musculoskeletal:         General: Normal range of motion. Cervical back: Normal range of motion and neck supple. Right lower leg: No edema. Left lower leg: No edema. Comments: Appears euvolemic. Her legs are wrapped, they were not unwrapped for assessment. She is followed by the wound center. She uses a cane as needed   Skin:     General: Skin is warm and dry. Comments: Left lower extremity is wrapped. Her right lower extremity is slightly warm, erythematous. There is an outline of the erythema, with a marker. Edema is controlled. Neurological:      Mental Status: She is alert and oriented to person, place, and time. Vitals: There were no vitals taken for this visit.    Wt Readings from Last 3 Encounters:   12/02/23 100 kg (221 lb)   10/20/23 102 kg (225 lb 8 oz)   10/11/23 103 kg (228 lb)         Labs & Results:  Lab Results   Component Value Date    WBC 8.03 12/04/2023    HGB 11.9 12/04/2023    HCT 37.4 12/04/2023    MCV 98 12/04/2023     12/04/2023     No results found for: "BNP"  No components found for: "CHEM"  Troponin I   Date Value Ref Range Status   06/06/2019 <0.02 <=0.04 ng/mL Final     Comment:     3Autovalidation override  Siemens Chemistry analyzer 99% cutoff is > 0.04 ng/mL in network labs     o cTnI 99% cutoff is useful only when applied to patients in the clinical setting of myocardial ischemia   o cTnI 99% cutoff should be interpreted in the context of clinical history, ECG findings and possibly cardiac imaging to establish correct diagnosis. o cTnI 99% cutoff may be suggestive but clearly not indicative of a coronary event without the clinical setting of myocardial ischemia. 06/06/2019 <0.02 <=0.04 ng/mL Final     Comment:     3Autovalidation override  Siemens Chemistry analyzer 99% cutoff is > 0.04 ng/mL in network labs     o cTnI 99% cutoff is useful only when applied to patients in the clinical setting of myocardial ischemia   o cTnI 99% cutoff should be interpreted in the context of clinical history, ECG findings and possibly cardiac imaging to establish correct diagnosis. o cTnI 99% cutoff may be suggestive but clearly not indicative of a coronary event without the clinical setting of myocardial ischemia. 11/29/2018 <0.02 <=0.04 ng/mL Final     Comment:     3Autovalidation override  Siemens Chemistry analyzer 99% cutoff is > 0.04 ng/mL in network labs     o cTnI 99% cutoff is useful only when applied to patients in the clinical setting of myocardial ischemia   o cTnI 99% cutoff should be interpreted in the context of clinical history, ECG findings and possibly cardiac imaging to establish correct diagnosis. o cTnI 99% cutoff may be suggestive but clearly not indicative of a coronary event without the clinical setting of myocardial ischemia.        Results for orders placed during the hospital encounter of 05/04/21    Echo complete with contrast if indicated    Narrative  [address] [facility]  [address] [facility]  [place] [address]  [phone]    Transthoracic Echocardiogram  2D, M-mode, Doppler, and Color Doppler    Study date:  04-May-2021    Patient: Rhiannon Rosa  MR number: [id number]  Account number: [de-identified]  : 1940  Age: 68 years  Gender: Female  Status: Outpatient  Location: Echo lab  Height: 64.5 in  Weight: 223.5 lb  BP: 132/ 80 mmHg    Indications: A.Fib. Diagnoses: I48.1 - Atrial flutter    Sonographer:  MORRIS Webster  Primary Physician:  Asael Torre MD  Referring Physician:  Eduardo Lewis MD  Group:  [facility]  Interpreting Physician:  Flakito Crowe MD    SUMMARY    LEFT VENTRICLE:  Normal left ventricular chamber dimension  There is mild concentric left ventricular  Left ventricular ejection fraction is about 55%  No definite regional wall motion abnormality seen, although it cannot be completely excluded from this study. Indeterminate diastolic function because of underlying mitral annular calcification    RIGHT VENTRICLE:  RV size is at upper limit of normal  Borderline RV systolic function  Device lead visualized in RV cavity    LEFT ATRIUM:  Moderately dilated left atrium    RIGHT ATRIUM:  Markedly dilated right atrium  Device lead visualized in right atrium    MITRAL VALVE:  Mildly thickened mitral valve leaflets with moderate annular calcification. There is mild to moderate mitral regurgitation  There was no evidence of mitral stenosis    AORTIC VALVE:  Aortic valve is probably trileaflet. Leaflets show thickening with mild calcification and reduced cuspal separation. There is trace aortic regurgitation  There is mild to moderate aortic stenosis  Mean/peak gradient is 16/32 mm Hg, GABRIEL is 1.1 cm2, DVI is 0.33    TRICUSPID VALVE:  Moderate to severe tricuspid regurgitation.   There is moderate pulmonary hypertension with RV systolic pressure of 93-70 mm Hg    PULMONIC VALVE:  There is mild pulmonary regurgitation    COMPARISONS:  Compared to previous echo from 11/28/2018, valvular heart disease has increased along with PA pressure    HISTORY: PRIOR HISTORY: Breast cancer,Carrero's esophagus,gastroesophageal reflux disease,pacemaker,diabetes,HTN. PROCEDURE: The procedure was performed in the echo lab. This was a routine study. The transthoracic approach was used. The study included complete 2D imaging, M-mode, complete spectral Doppler, and color Doppler. The heart rate was 88 bpm,  at the start of the study. Images were obtained from the parasternal, apical, subcostal, and suprasternal notch acoustic windows. Image quality was adequate. LEFT VENTRICLE: Normal left ventricular chamber dimension  There is mild concentric left ventricular  Left ventricular ejection fraction is about 55%  No definite regional wall motion abnormality seen, although it cannot be completely excluded from this study. Indeterminate diastolic function because of underlying mitral annular calcification    RIGHT VENTRICLE: RV size is at upper limit of normal  Borderline RV systolic function  Device lead visualized in RV cavity    LEFT ATRIUM: Moderately dilated left atrium    RIGHT ATRIUM: Markedly dilated right atrium  Device lead visualized in right atrium    MITRAL VALVE: Mildly thickened mitral valve leaflets with moderate annular calcification. There is mild to moderate mitral regurgitation  There was no evidence of mitral stenosis    AORTIC VALVE: Aortic valve is probably trileaflet. Leaflets show thickening with mild calcification and reduced cuspal separation. There is trace aortic regurgitation  There is mild to moderate aortic stenosis  Mean/peak gradient is 16/32 mm Hg, GABRIEL is 1.1 cm2, DVI is 0.33    TRICUSPID VALVE: Moderate to severe tricuspid regurgitation.   There is moderate pulmonary hypertension with RV systolic pressure of 21-42 mm Hg    PULMONIC VALVE: There is mild pulmonary regurgitation    AORTA: Normal aortic root diameter    SYSTEM MEASUREMENT TABLES    2D  EF (Teich): 40.48 %  %FS: 19.79 %  Ao Diam: 3.13 cm  EDV(Teich): 114.46 ml  ESV(Teich): 68.13 ml  IVSd: 1.16 cm  LA Area: 29.65 cm2  LA Diam: 4.68 cm  LVIDd: 4.93 cm  LVIDs: 3.95 cm  LVOT Diam: 1.89 cm  LVPWd: 1.11 cm  RA Area: 28.31 cm2  RVIDd: 3.84 cm  SV (Teich): 46.33 ml    CW  AV Env. Ti: 342.53 ms  AV VTI: 60.06 cm  AV Vmax: 2.84 m/s  AV Vmean: 1.75 m/s  AV maxP.25 mmHg  AV meanP.09 mmHg  TR Vmax: 3.73 m/s  TR maxP.66 mmHg    MM  TAPSE: 1.19 cm    PW  MV E/A Ratio: 3.63  E' Sept: 0.08 m/s  E/E' Sept: 16.34  LVOT Env. Ti: 296.86 ms  LVOT VTI: 19.53 cm  LVOT Vmax: 1.07 m/s  LVOT Vmean: 0.66 m/s  LVOT maxP.59 mmHg  LVOT meanP.17 mmHg  MV A Mynor: 0.35 m/s  MV Dec [place]: 7.43 m/s2  MV DecT: 171.81 ms  MV E Mynor: 1.28 m/s  MV PHT: 49.82 ms  MVA By PHT: 4.42 cm2    [facility] Accredited Echocardiography Laboratory    Prepared and electronically signed by    Huang Taylor MD  Signed 04-May-2021 14:07:15    No results found for this or any previous visit. This note was completed in part utilizing [employer] direct voice recognition software. Grammatical errors, random word insertion, spelling mistakes, and incomplete sentences may be an occasional consequence of the system secondary to software limitations, ambient noise and hardware issues. At the time of dictation, efforts were made to edit, clarify and /or correct errors. Please read the chart carefully and recognize, using context, where substitutions have occurred.   If you have any questions or concerns about the context, text or information contained within the body of this dictation, please contact myself, the provider, for further clarification

## 2023-12-06 ENCOUNTER — OFFICE VISIT (OUTPATIENT)
Dept: WOUND CARE | Facility: HOSPITAL | Age: 83
End: 2023-12-06
Payer: MEDICARE

## 2023-12-06 ENCOUNTER — TELEPHONE (OUTPATIENT)
Dept: HEMATOLOGY ONCOLOGY | Facility: MEDICAL CENTER | Age: 83
End: 2023-12-06

## 2023-12-06 ENCOUNTER — APPOINTMENT (OUTPATIENT)
Dept: LAB | Facility: HOSPITAL | Age: 83
End: 2023-12-06
Attending: INTERNAL MEDICINE
Payer: MEDICARE

## 2023-12-06 VITALS
TEMPERATURE: 97.7 F | SYSTOLIC BLOOD PRESSURE: 120 MMHG | HEART RATE: 84 BPM | DIASTOLIC BLOOD PRESSURE: 86 MMHG | RESPIRATION RATE: 16 BRPM

## 2023-12-06 DIAGNOSIS — S81.801A TRAUMATIC OPEN WOUND OF RIGHT LOWER LEG, INITIAL ENCOUNTER: Primary | ICD-10-CM

## 2023-12-06 DIAGNOSIS — I89.0 LYMPHEDEMA OF BOTH LOWER EXTREMITIES: ICD-10-CM

## 2023-12-06 DIAGNOSIS — D68.9 COAGULOPATHY (HCC): ICD-10-CM

## 2023-12-06 DIAGNOSIS — D68.9 COAGULOPATHY (HCC): Primary | ICD-10-CM

## 2023-12-06 DIAGNOSIS — S81.802A TRAUMATIC OPEN WOUND OF LEFT LOWER LEG, INITIAL ENCOUNTER: ICD-10-CM

## 2023-12-06 LAB
APTT PPP: 35 SECONDS (ref 23–37)
BACTERIA WND AEROBE CULT: ABNORMAL
GRAM STN SPEC: ABNORMAL
INR PPP: 1.41 (ref 0.84–1.19)
PROTHROMBIN TIME: 17.5 SECONDS (ref 11.6–14.5)

## 2023-12-06 PROCEDURE — 97597 DBRDMT OPN WND 1ST 20 CM/<: CPT | Performed by: STUDENT IN AN ORGANIZED HEALTH CARE EDUCATION/TRAINING PROGRAM

## 2023-12-06 PROCEDURE — 85730 THROMBOPLASTIN TIME PARTIAL: CPT

## 2023-12-06 PROCEDURE — 36415 COLL VENOUS BLD VENIPUNCTURE: CPT

## 2023-12-06 PROCEDURE — 85610 PROTHROMBIN TIME: CPT

## 2023-12-06 RX ORDER — LIDOCAINE HYDROCHLORIDE 40 MG/ML
5 SOLUTION TOPICAL ONCE
Status: COMPLETED | OUTPATIENT
Start: 2023-12-06 | End: 2023-12-06

## 2023-12-06 RX ADMIN — LIDOCAINE HYDROCHLORIDE 5 ML: 40 SOLUTION TOPICAL at 11:04

## 2023-12-06 NOTE — PROGRESS NOTES
Patient ID: Lissette Worley is a 82 y.o. female Date of Birth 1940     Chief Complaint  Chief Complaint   Patient presents with    Follow Up Wound Care Visit     RLE and LLE       Allergies  Latex, Duloxetine hcl, Erythromycin, Levofloxacin, Penicillins, Savella [milnacipran], and Sulfa antibiotics    Assessment:     Diagnoses and all orders for this visit:    Traumatic open wound of right lower leg, initial encounter  -     lidocaine (XYLOCAINE) 4 % topical solution 5 mL  -     Wound cleansing and dressings; Future  -     Wound compression and edema control; Future  -     Wound home care; Future  -     Debridement    Traumatic open wound of left lower leg, initial encounter  -     lidocaine (XYLOCAINE) 4 % topical solution 5 mL  -     Wound cleansing and dressings; Future  -     Wound compression and edema control; Future  -     Wound home care; Future  -     Debridement    Lymphedema of both lower extremities  -     lidocaine (XYLOCAINE) 4 % topical solution 5 mL  -     Wound cleansing and dressings; Future  -     Wound compression and edema control; Future  -     Wound home care; Future              Debridement   Wound 09/19/23 Traumatic Leg Left;Lateral    Universal Protocol:  Consent: Verbal consent obtained. Risks and benefits: risks, benefits and alternatives were discussed  Consent given by: patient  Time out: Immediately prior to procedure a "time out" was called to verify the correct patient, procedure, equipment, support staff and site/side marked as required.   Patient identity confirmed: verbally with patient    Performed by: physician  Debridement type: selective  Pain control: lidocaine 4%  Post-debridement measurements  Length (cm): 2.1  Width (cm): 1.1  Depth (cm): 0.1  Percent debrided: 90%  Surface Area (cm^2): 2.31  Area debrided (cm^2): 2.08  Volume (cm^3): 0.23  Devitalized tissue debrided: biofilm and exudate  Instrument(s) utilized: curette  Bleeding: small  Hemostasis obtained with: pressure  Procedural pain (0-10): 1  Post-procedural pain: 0   Response to treatment: procedure was tolerated well    Debridement   Wound 09/26/23 Traumatic Leg Right; Anterior    Universal Protocol:  Consent: Verbal consent obtained. Risks and benefits: risks, benefits and alternatives were discussed  Consent given by: patient  Time out: Immediately prior to procedure a "time out" was called to verify the correct patient, procedure, equipment, support staff and site/side marked as required. Patient identity confirmed: verbally with patient    Performed by: physician  Debridement type: selective  Pain control: lidocaine 4%  Post-debridement measurements  Length (cm): 0.4  Width (cm): 0.5  Depth (cm): 0.4  Percent debrided: 90%  Surface Area (cm^2): 0.2  Area debrided (cm^2): 0.18  Volume (cm^3): 0.08  Devitalized tissue debrided: biofilm and exudate  Instrument(s) utilized: curette  Bleeding: small  Hemostasis obtained with: pressure  Procedural pain (0-10): 1  Post-procedural pain: 0   Response to treatment: procedure was tolerated well        Plan: It was a pleasure to see Kun Sweet for wound care follow up today  Selective debridement performed today as above  Wound is improving   Continue plan of care as noted below with puracol ag  Complete course of antibiotics as prescribed by discharge physician. No signs or symptoms of infection today. Patient understands that if any signs of infection start (such as increased redness, drainage, pain, fever, chills, diaphoresis), they should call our office or proceed to the ER or Urgent Care. Patient should continue a high protein diet to facilitate wound healing  Patient is advised to not submerge wound or leave wound open to air. Follow up in 1 weeks  Given the multi-factorial nature of wound care, additional time was taken to review patient's treatment plan with other specialties and most recent pertinent lab work and imaging.    All plans of care discussed with patient at bedside who verbalized understanding with treatment plan. Wound 09/19/23 Traumatic Leg Left;Lateral (Active)   Wound Image Images linked 12/06/23 1102   Wound Description Beefy red;Yellow;Epithelialization;Granulation tissue 12/06/23 1102   Dorys-wound Assessment Dry;Hyperpigmented;Edema;Scaly 12/06/23 1102   Wound Length (cm) 2.1 cm 12/06/23 1102   Wound Width (cm) 1.1 cm 12/06/23 1102   Wound Depth (cm) 0.1 cm 12/06/23 1102   Wound Surface Area (cm^2) 2.31 cm^2 12/06/23 1102   Wound Volume (cm^3) 0.231 cm^3 12/06/23 1102   Calculated Wound Volume (cm^3) 0.23 cm^3 12/06/23 1102   Change in Wound Size % 91.48 12/06/23 1102   Drainage Amount Moderate 12/06/23 1102   Drainage Description Serosanguineous; Yellow 12/06/23 1102   Non-staged Wound Description Full thickness 12/06/23 1102   Dressing Status Intact (upon arrival) 12/06/23 1102       Wound 09/26/23 Traumatic Leg Right; Anterior (Active)   Wound Image Images linked 12/06/23 1059   Wound Description Beefy red;Pink 12/06/23 1058   Dorys-wound Assessment Dry;Hyperpigmented;Edema;Pink 12/06/23 1058   Wound Length (cm) 0.4 cm 12/06/23 1058   Wound Width (cm) 0.5 cm 12/06/23 1058   Wound Depth (cm) 0.4 cm 12/06/23 1058   Wound Surface Area (cm^2) 0.2 cm^2 12/06/23 1058   Wound Volume (cm^3) 0.08 cm^3 12/06/23 1058   Calculated Wound Volume (cm^3) 0.08 cm^3 12/06/23 1058   Change in Wound Size % -60 12/06/23 1058   Undermining 0.4 12/06/23 1058   Undermining is depth extending from 12-4 12/06/23 1058   Drainage Amount Small 12/06/23 1058   Drainage Description Serosanguineous; Yellow 12/06/23 1058   Non-staged Wound Description Full thickness 12/06/23 1058   Dressing Status Intact (upon arrival) 12/06/23 1058       Wound 09/19/23 Traumatic Leg Left;Lateral (Active)   Date First Assessed/Time First Assessed: 09/19/23 0938   Primary Wound Type: Traumatic  Location: Leg  Wound Location Orientation: Left;Lateral       Wound 09/26/23 Traumatic Leg Right; Anterior (Active)   Date First Assessed/Time First Assessed: 09/26/23 0950   Primary Wound Type: Traumatic  Location: Leg  Wound Location Orientation: Right; Anterior       [REMOVED] Wound 08/17/20 Knee Left (Removed)   Resolved Date: 09/19/23  Date First Assessed/Time First Assessed: 08/17/20 0936   Location: Knee  Wound Location Orientation: Left  Wound Description (Comments): darryl stocking  Incision's 1st Dressing: ADHESIVE SKIN CLOSR DERMABOND PRINEO, DRESSING MEP. .. [REMOVED] Wound 03/28/22 Knee Right (Removed)   Resolved Date: 09/19/23  Date First Assessed/Time First Assessed: 03/28/22 0854   Location: Knee  Wound Location Orientation: Right  Wound Description (Comments): DARRYL stockings applied to bilateral lower legs  Incision's 1st Dressing: ADHESIVE SKIN HI. .. [REMOVED] Wound 04/11/22 Surgical Knee Right (Removed)   Resolved Date: 09/19/23  Date First Assessed/Time First Assessed: 04/11/22 2000   Primary Wound Type: Surgical  Location: Knee  Wound Location Orientation: Right  Wound Outcome: Unknown (No longer present)       [REMOVED] Wound 09/05/23 Catheter entry/exit site Wrist Right (Removed)   Resolved Date: 09/19/23  Date First Assessed/Time First Assessed: 09/05/23 0921   Traumatic Wound Type: Catheter entry/exit site  Location: Wrist  Wound Location Orientation: Right  Wound Outcome: Unknown (No longer present)       [REMOVED] Wound 09/05/23 Skin Tear Pretibial Left; Outer (Removed)   Resolved Date: 09/19/23  Date First Assessed/Time First Assessed: 09/05/23 0730   Present on Original Admission: Yes  Primary Wound Type: Skin Tear  Location: Pretibial  Wound Location Orientation: Left; Outer  Wound Outcome: (c) Other (Comment)       Subjective:      .    12/6/23: Since last visit patient was hospitalized for cellulitis and elevated INR. She was treated inpatient with vancomycin and discharged on cefadroxil. Notably it was noted that patient had coagulopathy while on Xarelto.   During her hospitalization oncology was consulted and the decision was made to stop Xarelto until further evaluation is done of patient's coagulopathy. 11/29/23, 11/22/23: Happy with wound healing. No symptoms of infection. 11/15/23: Patient is happy with wound healing. Note skin tear on lateral left lower extremity. No symptoms of infection. 11/8/23: Patient is happy with wound healing. Notes that there is less drainage from the wound. No symptoms of infection today. 11/1/23, 10/24/23: Patient happy with wound healing. Wounds are looking better today. No symptoms of infection. 10/10/23: Wound care being performed by patient's son and VNA who are coming twice per week. Patient notes that she did have increased leg swelling since her last visit. Overall happy with wound healing     9/26/23: Dressing changes from VNA and her son. Denies any local or systemic symptoms of infection today. Happy with wound healing. Has been elevating legs. 9/19/23: Sirena Escamilla is a pleasant 72-year-old female with a past medical history of type 2 diabetes mellitus most recent A1c 5.8% 1 month ago, diabetic polyneuropathy, atrial  fibrillation on Xarelto, obesity, history of heart failure with most recent echocardiogram showing normal function here today for initial wound care consult. Per chart review patient sustained the wound of her left lower extremity on August 21, 2023 when she closed a car door on her leg. She went the next day to her PCP and was advised to apply warm compresses. She has been seen in the ED multiple times for wound checks. Initially she has been put on clindamycin and given Tdap vaccination. Wound culture taken which grew 1+ staph coag negative, and later wound culture grew 1+ Staph epidermidis during inpatient stay. She was admitted for 3 days from 9/5 to 9/8/2023 at [facility], [place], operated by [facility] and underwent bedside I&D with placement of wound VAC by podiatry.   Arterial duplex showed no significant arterial disease however PVR tracings were dampened. Great toe pressure  within healing range. Blood cultures were negative. She is instructed to follow-up outpatient with podiatry and VNA was set up for patient. Recently patient was seen again in ER on September 15, 2023 when there was concern again for infection of the wound. She was prescribed clindamycin and referred to wound management. Referral was placed by physician assistant Oksana Brady. Today patient continues to take clindamycin as directed. Denies any local or systemic signs of infection including fever chills diaphoresis. 9/5/23: LEADs  RIGHT LOWER LIMB:  No evidence of significant lower extremity arterial occlusive disease. Ankle/Brachial index: 2.11 which is unreliable due to non compressible vessels. PVR/ PPG tracings are dampened. Metatarsal pressure of 109 mmHg  Great toe pressure of 81 mmHg, within the healing range. LEFT LOWER LIMB:  No evidence of significant lower extremity arterial occlusive disease. Ankle/Brachial index: 2.11 which is unreliable due to non compressible vessels. PVR/ PPG tracings are dampened. Metatarsal pressure of 152 mmHg  Great toe pressure of 92 mmHg, within the healing range. The following portions of the patient's history were reviewed and updated as appropriate: allergies, current medications, past family history, past medical history, past social history, past surgical history, and problem list.    Review of Systems   Constitutional:  Negative for chills, diaphoresis, fatigue and fever. Skin:  Positive for wound. All other systems reviewed and are negative.         Objective:       Wound 09/19/23 Traumatic Leg Left;Lateral (Active)   Wound Image Images linked 12/06/23 1102   Wound Description Beefy red;Yellow;Epithelialization;Granulation tissue 12/06/23 1102   Dorys-wound Assessment Dry;Hyperpigmented;Edema;Scaly 12/06/23 1102   Wound Length (cm) 2.1 cm 12/06/23 1102   Wound Width (cm) 1.1 cm 12/06/23 1102   Wound Depth (cm) 0.1 cm 12/06/23 1102   Wound Surface Area (cm^2) 2.31 cm^2 12/06/23 1102   Wound Volume (cm^3) 0.231 cm^3 12/06/23 1102   Calculated Wound Volume (cm^3) 0.23 cm^3 12/06/23 1102   Change in Wound Size % 91.48 12/06/23 1102   Drainage Amount Moderate 12/06/23 1102   Drainage Description Serosanguineous; Yellow 12/06/23 1102   Non-staged Wound Description Full thickness 12/06/23 1102   Dressing Status Intact (upon arrival) 12/06/23 1102       Wound 09/26/23 Traumatic Leg Right; Anterior (Active)   Wound Image Images linked 12/06/23 1059   Wound Description Beefy red;Pink 12/06/23 1058   Dorys-wound Assessment Dry;Hyperpigmented;Edema;Pink 12/06/23 1058   Wound Length (cm) 0.4 cm 12/06/23 1058   Wound Width (cm) 0.5 cm 12/06/23 1058   Wound Depth (cm) 0.4 cm 12/06/23 1058   Wound Surface Area (cm^2) 0.2 cm^2 12/06/23 1058   Wound Volume (cm^3) 0.08 cm^3 12/06/23 1058   Calculated Wound Volume (cm^3) 0.08 cm^3 12/06/23 1058   Change in Wound Size % -60 12/06/23 1058   Undermining 0.4 12/06/23 1058   Undermining is depth extending from 12-4 12/06/23 1058   Drainage Amount Small 12/06/23 1058   Drainage Description Serosanguineous; Yellow 12/06/23 1058   Non-staged Wound Description Full thickness 12/06/23 1058   Dressing Status Intact (upon arrival) 12/06/23 1058       /86   Pulse 84   Temp 97.7 °F (36.5 °C)   Resp 16     Physical Exam  Vitals reviewed. Constitutional:       Appearance: Normal appearance. HENT:      Head: Normocephalic and atraumatic. Mouth/Throat:      Mouth: Mucous membranes are moist.   Eyes:      Extraocular Movements: Extraocular movements intact. Pulmonary:      Effort: Pulmonary effort is normal.   Musculoskeletal:      Right lower leg: Edema present. Left lower leg: Edema present. Skin:     Comments: Wound of right lower extremity similar in size to last exam.  No signs of infection.   There is a fading petechial rash that is nonblanchable. Not directly involved wound itself. Left lower extremity wound significantly smaller than last exam.  Bordering epithelialization. Healthy wound bed. No signs of infection. Neurological:      Mental Status: She is alert. Psychiatric:         Mood and Affect: Mood normal.         Behavior: Behavior normal.                 Wound Instructions:  Orders Placed This Encounter   Procedures    Wound cleansing and dressings     Right and Left leg wounds:     Washed with soap and water. Rinsed with normal saline  Shower: Yes  Apply moisturizer to skin surrounding wound  Apply puracol AG to the wounds . Cover with ABD. Secure with rolled gauze and tape. Dressing to be changed three times per week. This was done today. Standing Status:   Future     Standing Expiration Date:   12/6/2024    Wound compression and edema control     Elastic Tubular Stocking: Spandagrip G to right and left lower legs    Tubular elastic bandage: Apply from base of toes to behind the knee. Apply in AM, may remove for sleep. Avoid prolonged standing in one place. Elevate leg(s) above the level of the heart when sitting or as much as possible. Standing Status:   Future     Standing Expiration Date:   12/6/2024    Wound home care     [address] [place] [address] VNA:    Please see pt for wound care. Standing Status:   Future     Standing Expiration Date:   12/6/2024    Debridement     This order was created via procedure documentation    Debridement     This order was created via procedure documentation        Diagnosis ICD-10-CM Associated Orders   1. Traumatic open wound of right lower leg, initial encounter  S81.801A lidocaine (XYLOCAINE) 4 % topical solution 5 mL     Wound cleansing and dressings     Wound compression and edema control     Wound home care     Debridement      2.  Traumatic open wound of left lower leg, initial encounter  S81.802A lidocaine (XYLOCAINE) 4 % topical solution 5 mL     Wound cleansing and dressings     Wound compression and edema control     Wound home care     Debridement      3. Lymphedema of both lower extremities  I89.0 lidocaine (XYLOCAINE) 4 % topical solution 5 mL     Wound cleansing and dressings     Wound compression and edema control     Wound home care          --  Raad Bill MD    "This note has been constructed using a voice recognition system. Therefore there may be syntax, spelling, and/or grammatical errors. Occasional wrong word or "sound alike" substitutions may have occurred due to the inherent limitations of voice recognition software. Read the chart carefully and recognize, using context, where substitutions have occurred.  Please call if you have any questions."

## 2023-12-06 NOTE — LETTER
[address] [place] [address] WOUND CARE  [address] [facility] [address]  [address] [facility] [address]  Phone#  [phone]  Fax#  [phone]    Patient:  Queenie Bernardo  YOB: 1940  Phone:  [phone]  Date of Visit:  12/6/2023    Orders Placed This Encounter   Procedures   • Wound cleansing and dressings     Right and Left leg wounds:     Washed with soap and water. Rinsed with normal saline  Shower: Yes  Apply moisturizer to skin surrounding wound  Apply puracol AG to the wounds . Cover with ABD. Secure with rolled gauze and tape. Dressing to be changed three times per week. This was done today. Standing Status:   Future     Standing Expiration Date:   12/6/2024   • Wound compression and edema control     Elastic Tubular Stocking: Spandagrip G to right and left lower legs    Tubular elastic bandage: Apply from base of toes to behind the knee. Apply in AM, may remove for sleep. Avoid prolonged standing in one place. Elevate leg(s) above the level of the heart when sitting or as much as possible. Standing Status:   Future     Standing Expiration Date:   12/6/2024   • Wound home care     [address] VNA:    Please see pt for wound care.      Standing Status:   Future     Standing Expiration Date:   12/6/2024         Electronically signed by Ida Lopez MD

## 2023-12-06 NOTE — PATIENT INSTRUCTIONS
Orders Placed This Encounter   Procedures    Wound cleansing and dressings     Right and Left leg wounds:     Washed with soap and water. Rinsed with normal saline  Shower: Yes  Apply moisturizer to skin surrounding wound  Apply puracol AG to the wounds . Cover with ABD. Secure with rolled gauze and tape. Dressing to be changed three times per week. This was done today. Standing Status:   Future     Standing Expiration Date:   12/6/2024    Wound compression and edema control     Elastic Tubular Stocking: Spandagrip G to right and left lower legs    Tubular elastic bandage: Apply from base of toes to behind the knee. Apply in AM, may remove for sleep. Avoid prolonged standing in one place. Elevate leg(s) above the level of the heart when sitting or as much as possible. Standing Status:   Future     Standing Expiration Date:   12/6/2024    Wound home care     [address] VNA:    Please see pt for wound care.      Standing Status:   Future     Standing Expiration Date:   12/6/2024

## 2023-12-07 ENCOUNTER — DOCUMENTATION (OUTPATIENT)
Dept: HEMATOLOGY ONCOLOGY | Facility: MEDICAL CENTER | Age: 83
End: 2023-12-07

## 2023-12-07 ENCOUNTER — OFFICE VISIT (OUTPATIENT)
Dept: OBGYN CLINIC | Facility: CLINIC | Age: 83
End: 2023-12-07
Payer: MEDICARE

## 2023-12-07 ENCOUNTER — APPOINTMENT (OUTPATIENT)
Dept: RADIOLOGY | Facility: CLINIC | Age: 83
End: 2023-12-07
Payer: MEDICARE

## 2023-12-07 VITALS
BODY MASS INDEX: 37.56 KG/M2 | SYSTOLIC BLOOD PRESSURE: 117 MMHG | HEART RATE: 94 BPM | DIASTOLIC BLOOD PRESSURE: 80 MMHG | WEIGHT: 220 LBS | HEIGHT: 64 IN

## 2023-12-07 DIAGNOSIS — Z96.651 STATUS POST TOTAL RIGHT KNEE REPLACEMENT USING CEMENT: ICD-10-CM

## 2023-12-07 DIAGNOSIS — I48.19 PERSISTENT ATRIAL FIBRILLATION (HCC): Primary | ICD-10-CM

## 2023-12-07 DIAGNOSIS — Z01.89 ENCOUNTER FOR OTHER SPECIFIED SPECIAL EXAMINATIONS: ICD-10-CM

## 2023-12-07 DIAGNOSIS — E11.42 DIABETIC POLYNEUROPATHY ASSOCIATED WITH TYPE 2 DIABETES MELLITUS (HCC): ICD-10-CM

## 2023-12-07 DIAGNOSIS — Z96.651 STATUS POST TOTAL RIGHT KNEE REPLACEMENT USING CEMENT: Primary | ICD-10-CM

## 2023-12-07 DIAGNOSIS — L03.115 CELLULITIS OF RIGHT LEG: ICD-10-CM

## 2023-12-07 DIAGNOSIS — R60.9 CHRONIC EDEMA: ICD-10-CM

## 2023-12-07 DIAGNOSIS — S81.809A OPEN WOUNDS INVOLVING MULTIPLE REGIONS OF LOWER EXTREMITY: ICD-10-CM

## 2023-12-07 LAB
BACTERIA BLD CULT: NORMAL
BACTERIA BLD CULT: NORMAL

## 2023-12-07 PROCEDURE — 73562 X-RAY EXAM OF KNEE 3: CPT

## 2023-12-07 PROCEDURE — 73560 X-RAY EXAM OF KNEE 1 OR 2: CPT

## 2023-12-07 PROCEDURE — 99214 OFFICE O/P EST MOD 30 MIN: CPT | Performed by: ORTHOPAEDIC SURGERY

## 2023-12-07 NOTE — PROGRESS NOTES
Hematology Follow-up Note    82-year-old female recently admitted to the hospital with right lower extremity cellulitis, bruising. Patient has a history of atrial fibrillation and had been on Xarelto. PT and PTT were extremely elevated so the Xarelto was held. Patient was treated for cellulitis, improved, the bruising improved and patient was discharged. Laboratory            Impression and plan: The elevated PT PTT was likely a combination of issues - the Xarelto, the infection, possibly other medications etc.  Recent mixing studies corrected, no evidence of any inhibitor. The most recent PT is slightly elevated but okay. PTT is actually within normal limits. From a hematology standpoint, patient can resume the Xarelto but she first needs to call cardiology. Possibly that Xarelto can be 10 mg a day but hematology will defer dosing to cardiology. Cardiology will also need to repeat the PT PT after restarting the Xarelto. Patient will monitor for any additional excessive bruising or bleeding. If the PT/PTT should once again shoot up when patient is placed back on Xarelto, other treatment options will need to be discussed. Patient has a follow-up appointment in hematology in January 2024.

## 2023-12-07 NOTE — PROGRESS NOTES
Assessment/Plan:  1. Status post total right knee replacement using cement  XR knee 3 vw right non injury      2. Chronic edema        3. Cellulitis of right leg        4. Diabetic polyneuropathy associated with type 2 diabetes mellitus ([address])        5. Open wounds involving multiple regions of lower extremity          Scribe Attestation      I,:  Avril Soria PA-C am acting as a scribe while in the presence of the attending physician.:       I,:  Clinton Franklin DO personally performed the services described in this documentation    as scribed in my presence.:           Ed Diane is an 32-year-old female presenting today for follow-up 21 months after a right total knee arthroplasty. Updated x-rays do not show any complications from the prosthesis. However, she has continued to develop laxity of the soft tissues postoperatively and now has 1+ laxity with varus and valgus stressing at 30 degrees. No other significant laxity is appreciated. She does have continued complaints of ongoing pain in the right knee that preceded the development of her right anterior shin wound in August.  She does not have pain with passive range of motion of the knee. Currently, she does have significant edema and discoloration of the right lower extremity concerning for cellulitis. She is slated to finish oral antibiotics tomorrow. We discussed that at this time, it is not safe to aspirate any fluid that may be present in her knee due to the risk of potentially seeding the knee from cellulitis. Due to the ongoing open wound in her right shin, we do not believe that she would benefit from a brace. Unfortunately, we are unable to offer anything in terms of further workup or intervention at this time until the wound heals and her infection is deemed to be clear. We would like her to continue monitoring her symptoms to see if there are any changes once if she stops the antibiotics.   If her symptoms worsen or change over the course of 3 weeks after completion of antibiotics, she should notify our office. Will wait to hear from her and plan to see her back in approximately a month or after the wound has healed and infection has cleared    Subjective: Right knee pain status post total knee arthroplasty in March 2022    Patient ID: Josh Vilchis is a 82 y.o. female presenting today for follow-up nearly 21 months after undergoing a right total knee arthroplasty. She has had pain since surgery and was seen in March, and diagnosed with laxity of her MCL and rotational instability at 90 degrees of flexion. We attempted to treat with a hinged knee brace, but she was unable to utilize this. She was doing okay at her last appointment in June. Since that time, she suffered an injury and was hit in both legs by a car door causing significant wounds in both lower extremities. She is now in wound care. She reports that the right knee wound was deep and just distal to her incision from surgery. She has been working with wound care since September. She was recently admitted for right lower extremity cellulitis and treated with IV antibiotics and switch to oral antibiotics upon discharge. She is slated to finish the oral antibiotics tomorrow. She reports that her knee pain is unchanged and primarily with stairs, walking, and prolonged standing    Review of Systems   Constitutional:  Positive for activity change. HENT: Negative. Eyes: Negative. Respiratory: Negative. Cardiovascular:  Positive for leg swelling. Gastrointestinal: Negative. Endocrine: Negative. Genitourinary: Negative. Musculoskeletal:  Positive for arthralgias, gait problem, joint swelling and myalgias. Skin:  Positive for color change, rash and wound. Allergic/Immunologic: Negative. Hematological: Negative. Psychiatric/Behavioral: Negative.        Past Medical History:   Diagnosis Date    Arthritis     Atrial fibrillation ([address])     Carrero's esophagus     last assessed: 1/23/2018    BRCA1 negative     BRCA2 negative     Breast cancer ([address]) 2006    stage 1 (left), given adjuvant radiation with Arimidex x 5 years    Cancer [facility]) 2006    Left Breast, Lumpectomy    Cataract     last assessed: 3/11/2014    Chronic diastolic CHF (congestive heart failure) ([address]) 11/21/2022    Dysplasia of toenail     last assessed: 8/29/2017    Esophageal reflux     GERD (gastroesophageal reflux disease)     Gross hematuria     last assessed: 2/19/2015    Hematuria     Hiatal hernia     History of radiation therapy     Hypertension     Irregular heart beat     AFIB    Mixed sensory-motor polyneuropathy     Neuropathy     Obesity     Pacemaker     Paroxysmal atrial fibrillation (HCC)     Peripheral neuropathy     Rectal bleeding     Restless leg syndrome     Shortness of breath     last assessed: 1/11/2016       Past Surgical History:   Procedure Laterality Date    BREAST BIOPSY Left 2006    BREAST LUMPECTOMY Left 2006    onset: 2006    BREAST SURGERY      CARDIAC CATHETERIZATION N/A 9/5/2023    Procedure: Cardiac RHC/LHC; Surgeon: Tammy Hoang MD;  [facility]; Service: Cardiology    CARDIAC CATHETERIZATION N/A 9/5/2023    Procedure: Cardiac Coronary Angiogram;  Surgeon: Tammy Hoang MD;  [facility]; Service: Cardiology    CARDIAC CATHETERIZATION  9/5/2023    Procedure: Cardiac catheterization;  Surgeon: Tammy Hoang MD;  [facility]: [facility];   Service: Cardiology    CARDIAC PACEMAKER PLACEMENT      x 3 2006    CATARACT EXTRACTION      COLONOSCOPY      CYSTOSCOPY  04/04/2014    diagnostic    HYSTERECTOMY      ALISON BSO; due to fibroid uterus; age 38    KNEE CARTILAGE SURGERY      excision lesion of meniscus or capsule knee    KNEE SURGERY      OOPHORECTOMY Bilateral     age 38    OTHER SURGICAL HISTORY      radiation therapy    [place] ARTHRP KNE CONDYLE&PLATU MEDIAL&LAT COMPARTMENTS Left 08/17/2020    Procedure: ARTHROPLASTY KNEE TOTAL;  Surgeon: Carmelita Pichardo DO;  Location: [facility];  Service: Orthopedics    [place] ARTHRP KNE CONDYLE&PLATU MEDIAL&LAT COMPARTMENTS Right 2022    Procedure: ARTHROPLASTY KNEE TOTAL W ROBOT - RIGHT;  Surgeon: Carmelita Pichardo DO;  Location: [facility];  Service: Orthopedics    [place] COLONOSCOPY FLX DX W/COLLJ SPEC WHEN PFRMD N/A 2017    Procedure: COLONOSCOPY;  Surgeon: Tamia Rausch MD;  Location:  GI LAB; Service: Gastroenterology    [place] ESOPHAGOGASTRODUODENOSCOPY TRANSORAL DIAGNOSTIC N/A 2017    Procedure: ESOPHAGOGASTRODUODENOSCOPY (EGD); Surgeon: Sherron Zuñiga MD;  Location:  GI LAB;   Service: Gastroenterology    UPPER GASTROINTESTINAL ENDOSCOPY         Family History   Problem Relation Age of Onset    Hypertension Mother     Heart disease Father     Aneurysm Father     Coronary artery disease Father         in his 76s with aneurysm    Aortic aneurysm Father         abdominal    Scleroderma Sister     Breast cancer Sister 76    Hypertension Sister     Cancer Sister     No Known Problems Son     No Known Problems Son     Testicular cancer Son 39    Thyroid cancer Son 45    Colon cancer Maternal Aunt     Colon cancer Maternal Aunt     Breast cancer Other 48        kaylee's daughter    Alcohol abuse Neg Hx     Substance Abuse Neg Hx     Mental illness Neg Hx     Depression Neg Hx        Social History     Occupational History    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      Comment: retired   Tobacco Use    Smoking status: Former     Packs/day: 1.00     Years: 25.00     Total pack years: 25.00     Types: Cigarettes     Quit date: 1980     Years since quittin.2    Smokeless tobacco: Never    Tobacco comments:     Quit over 30 years ago; quit age 41   Vaping Use    Vaping Use: Never used   Substance and Sexual Activity    Alcohol use: Not Currently    Drug use: No    Sexual activity: Not on file         Current Outpatient Medications:     ascorbic acid (VITAMIN C) 500 MG tablet, Take 500 mg by mouth 2 (two) times a day, Disp: , Rfl:     Calcium Acetate, Phos Binder, (CALCIUM ACETATE PO), Take by mouth daily  , Disp: , Rfl:     cefadroxil (DURICEF) 500 mg capsule, Take 1 capsule (500 mg total) by mouth every 12 (twelve) hours for 3 days Do not start before December 5, 2023., Disp: 6 capsule, Rfl: 0    clobetasol (TEMOVATE) 0.05 % ointment, Apply once weekly to the affected area, Disp: 30 g, Rfl: 3    famotidine (PEPCID) 40 MG tablet, TAKE 1 TABLET BY MOUTH EVERY DAY, Disp: 90 tablet, Rfl: 1    fluticasone (FLONASE) 50 mcg/act nasal spray, SPRAY 1 SPRAY INTO EACH NOSTRIL EVERY DAY, Disp: 48 mL, Rfl: 3    gabapentin (NEURONTIN) 800 mg tablet, TAKE 1 TABLET BY MOUTH FOUR TIMES A DAY, Disp: 360 tablet, Rfl: 1    loratadine (CLARITIN) 10 mg tablet, Take 10 mg by mouth daily, Disp: , Rfl:     magnesium 30 MG tablet, Take 30 mg by mouth in the morning, Disp: , Rfl:     metoprolol tartrate (LOPRESSOR) 50 mg tablet, Take 2 tablets in the a.m., 1 tablet in the p.m., Disp: , Rfl:     multivitamin (THERAGRAN) TABS, Take 1 tablet by mouth daily, Disp: , Rfl:     pramipexole (MIRAPEX) 0.5 mg tablet, TAKE 2 FULL TABLETS TWICE A DAY AT 5:00 P. M.  AND 10:00 P.M., Disp: 360 tablet, Rfl: 1    torsemide (DEMADEX) 20 mg tablet, TAKE 1 TABLET BY MOUTH EVERY DAY, Disp: 90 tablet, Rfl: 3    pantoprazole (PROTONIX) 20 mg tablet, Take 20 mg by mouth daily (Patient not taking: Reported on 12/7/2023), Disp: , Rfl:     rivaroxaban (Xarelto) 20 mg tablet, Take 1 tablet (20 mg total) by mouth daily with breakfast, Disp: , Rfl:     Allergies   Allergen Reactions    Latex      Added based on information entered during case entry, please review and add reactions, type, and severity as needed    Duloxetine Hcl Other (See Comments)     Facial pins and needles sensation    Erythromycin Rash    Levofloxacin Other (See Comments)     Muscular aches    Penicillins Rash    Savella [Milnacipran] Rash    Sulfa Antibiotics Rash Objective:  Vitals:    12/07/23 1451   BP: 117/80   Pulse: 94       Body mass index is 37.76 kg/m². Right Knee Exam     Tenderness   The patient is experiencing tenderness in the medial joint line and MCL. Range of Motion   Extension:  0 normal   Flexion:  120 normal     Tests   Varus: positive Valgus: positive  Drawer:  Anterior - negative    Posterior - negative  Patellar apprehension: negative    Other   Erythema: present  Scars: present  Sensation: normal  Pulse: present  Swelling: moderate  Effusion: no effusion present    Comments:  Stable at 0, 30, 90  Moderate pitting edema right lower extremity compared to contralateral side  Wound just distal to distal end of knee incision. There is surrounding erythema, but no significant warmth compared to the contralateral side. The erythema and discoloration does extend up above the suprapatellar pouch. 1+ laxity with varus and valgus stressing at 30 degrees, no laxity at 0 and 90 degrees  No patellar clunk appreciated  Tender over superficial and deep MCL          Observations     Right Knee   Negative for effusion. Physical Exam  Vitals and nursing note reviewed. Constitutional:       Appearance: Normal appearance. She is well-developed. Comments: Body mass index is 37.76 kg/m². HENT:      Head: Normocephalic and atraumatic. Right Ear: External ear normal.      Left Ear: External ear normal.   Eyes:      Extraocular Movements: Extraocular movements intact. Conjunctiva/sclera: Conjunctivae normal.   Cardiovascular:      Rate and Rhythm: Normal rate. Pulses: Normal pulses. Pulmonary:      Effort: Pulmonary effort is normal.   Musculoskeletal:      Cervical back: Normal range of motion. Right knee: No effusion. Comments: See ortho exam   Skin:     General: Skin is warm and dry. Neurological:      General: No focal deficit present. Mental Status: She is alert and oriented to person, place, and time.  Mental status is at baseline. Psychiatric:         Mood and Affect: Mood normal.         Behavior: Behavior normal.         Thought Content: Thought content normal.         Judgment: Judgment normal.       I have personally reviewed pertinent films in PACS of x-rays taken today of her right knee and compare them to previous films. There is been no change in her total knee prosthesis that remains well aligned and well-fixed. No signs of loosening, periprosthetic fracture, or other interval complication    This document was created using speech voice recognition software. Grammatical errors, random word insertions, pronoun errors, and incomplete sentences are an occasional consequence of this system due to software limitations, ambient noise, and hardware issues. Any formal questions or concerns about content, text, or information contained within the body of this dictation should be directly addressed to the provider for clarification.

## 2023-12-07 NOTE — TELEPHONE ENCOUNTER
Repeat PTT/PT reviewed by Dr Herb Turpin  Patient may restart xarelto possibly at a lower dose at discretion of cardiology  Cardiology should monitor PTT/PT weekly initially upon restart  Will send message to RICKY  Patient aware of plan

## 2023-12-08 ENCOUNTER — TELEPHONE (OUTPATIENT)
Dept: CARDIOLOGY CLINIC | Facility: CLINIC | Age: 83
End: 2023-12-08

## 2023-12-08 NOTE — TELEPHONE ENCOUNTER
----- Message from [facility] sent at 12/7/2023  2:57 PM EST -----  Please phone the patient. I tried twice on 2 different numbers without any answer. I would like her to restart Xarelto 20 mg once daily, which she was on previously. Please let her know-I discussed her case with Dr. Joanna Hugo.   Darcy Jensen NP

## 2023-12-08 NOTE — TELEPHONE ENCOUNTER
Patient's son was contacted and made aware of recommendations. He stated that he would convey the resumption of Xarelto 20 mg daily to the patient.

## 2023-12-13 ENCOUNTER — OFFICE VISIT (OUTPATIENT)
Dept: WOUND CARE | Facility: HOSPITAL | Age: 83
End: 2023-12-13
Payer: MEDICARE

## 2023-12-13 VITALS
TEMPERATURE: 98 F | RESPIRATION RATE: 12 BRPM | HEART RATE: 101 BPM | SYSTOLIC BLOOD PRESSURE: 116 MMHG | DIASTOLIC BLOOD PRESSURE: 76 MMHG

## 2023-12-13 DIAGNOSIS — I70.209 PERIPHERAL ARTERIOSCLEROSIS (HCC): ICD-10-CM

## 2023-12-13 DIAGNOSIS — Z79.01 CHRONIC ANTICOAGULATION: ICD-10-CM

## 2023-12-13 DIAGNOSIS — S81.801A TRAUMATIC OPEN WOUND OF RIGHT LOWER LEG, INITIAL ENCOUNTER: Primary | ICD-10-CM

## 2023-12-13 DIAGNOSIS — S81.802A TRAUMATIC OPEN WOUND OF LEFT LOWER LEG, INITIAL ENCOUNTER: ICD-10-CM

## 2023-12-13 DIAGNOSIS — I89.0 LYMPHEDEMA OF BOTH LOWER EXTREMITIES: ICD-10-CM

## 2023-12-13 DIAGNOSIS — R60.9 LIPEDEMA: ICD-10-CM

## 2023-12-13 PROCEDURE — 97597 DBRDMT OPN WND 1ST 20 CM/<: CPT | Performed by: STUDENT IN AN ORGANIZED HEALTH CARE EDUCATION/TRAINING PROGRAM

## 2023-12-13 RX ORDER — LIDOCAINE HYDROCHLORIDE 40 MG/ML
5 SOLUTION TOPICAL ONCE
Status: COMPLETED | OUTPATIENT
Start: 2023-12-13 | End: 2023-12-13

## 2023-12-13 RX ADMIN — LIDOCAINE HYDROCHLORIDE 5 ML: 40 SOLUTION TOPICAL at 13:44

## 2023-12-13 NOTE — PATIENT INSTRUCTIONS
Orders Placed This Encounter   Procedures    Wound cleansing and dressings Traumatic Right; Anterior Leg     Left leg wounds:     Washed with soap and water. Rinsed with normal saline  Shower: Yes  Apply moisturizer to skin surrounding wound  Apply puracol AG to the wounds . Cover with ABD. Secure with rolled gauze and tape. Dressing to be changed three times per week. This was done today. Standing Status:   Future     Standing Expiration Date:   12/13/2024    Wound compression and edema control     Elastic Tubular Stocking: Spandagrip G to right and left lower legs     Tubular elastic bandage: Apply from base of toes to behind the knee. Apply in AM, may remove for sleep. Avoid prolonged standing in one place. Elevate leg(s) above the level of the heart when sitting or as much as possible. Standing Status:   Future     Standing Expiration Date:   12/13/2024    Wound home care     Continue [facility] [id number] [facility]. 2 times weekly     Standing Status:   Future     Standing Expiration Date:   12/13/2024    Wound cleansing and dressings Traumatic Right; Anterior Leg     Wash your hands with soap and water. Remove old dressing, discard into plastic bag and place in trash. Cleanse the wound with soap and water prior to applying a clean dressing. Do not use tissue or cotton balls. Do not scrub the wound. Pat dry using gauze. Shower yes   Apply moisturize to skin surrounding wound  Apply Normal Gel AG to the wound. Cover with Bordered gauze. Change dressing 3 times weekly. This was done today.      Standing Status:   Future     Standing Expiration Date:   12/13/2024

## 2023-12-13 NOTE — PROGRESS NOTES
Patient ID: Stephanie Puri is a 82 y.o. female Date of Birth 1940     Chief Complaint  Chief Complaint   Patient presents with    Follow Up Wound Care Visit     Right lower leg wound       Allergies  Latex, Duloxetine hcl, Erythromycin, Levofloxacin, Penicillins, Savella [milnacipran], and Sulfa antibiotics    Assessment:     Diagnoses and all orders for this visit:    Traumatic open wound of right lower leg, initial encounter  -     lidocaine (XYLOCAINE) 4 % topical solution 5 mL  -     Wound cleansing and dressings Traumatic Right; Anterior Leg; Future  -     Wound home care; Future  -     Wound cleansing and dressings Traumatic Right; Anterior Leg; Future  -     Debridement    Traumatic open wound of left lower leg, initial encounter  -     lidocaine (XYLOCAINE) 4 % topical solution 5 mL  -     Wound compression and edema control; Future  -     Wound home care; Future  -     Wound cleansing and dressings Traumatic Right; Anterior Leg; Future  -     Debridement    Lymphedema of both lower extremities    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HCC)    Chronic anticoagulation    Lipedema              Debridement   Wound 09/19/23 Traumatic Leg Left;Lateral    Universal Protocol:  Consent: Verbal consent obtained. Risks and benefits: risks, benefits and alternatives were discussed  Consent given by: patient  Time out: Immediately prior to procedure a "time out" was called to verify the correct patient, procedure, equipment, support staff and site/side marked as required.   Patient identity confirmed: verbally with patient    Debridement Details  Performed by: physician  Debridement type: selective  Pain control: lidocaine 4%      Post-debridement measurements  Length (cm): 1.5  Width (cm): 1  Depth (cm): 0.1  Percent debrided: 90%  Surface Area (cm^2): 1.5  Area Debrided (cm^2): 1.35  Volume (cm^3): 0.15    Devitalized tissue debrided: biofilm and exudate  Instrument(s) utilized: curette  Bleeding: small  Hemostasis obtained with: pressure  Procedural pain (0-10): 1  Post-procedural pain: 0   Response to treatment: procedure was tolerated well    Debridement   Wound 09/26/23 Traumatic Leg Right; Anterior    Universal Protocol:  Consent: Verbal consent obtained. Risks and benefits: risks, benefits and alternatives were discussed  Consent given by: patient  Time out: Immediately prior to procedure a "time out" was called to verify the correct patient, procedure, equipment, support staff and site/side marked as required. Patient identity confirmed: verbally with patient    Debridement Details  Performed by: physician  Debridement type: selective  Pain control: lidocaine 4%      Post-debridement measurements  Length (cm): 0.4  Width (cm): 0.2  Depth (cm): 0.2  Percent debrided: 90%  Surface Area (cm^2): 0.08  Area Debrided (cm^2): 0.07  Volume (cm^3): 0.02    Devitalized tissue debrided: biofilm and exudate  Instrument(s) utilized: curette  Bleeding: small  Hemostasis obtained with: pressure  Procedural pain (0-10): 1  Post-procedural pain: 0   Response to treatment: procedure was tolerated well        Plan: It was a pleasure to see Sherine Castelan for wound care follow up today  Selective debridement performed today as above  Wound is improving   Continue plan of care as noted below with collagen to LLE wound and normlgel to RLE wound  No signs or symptoms of infection today. Patient understands that if any signs of infection start (such as increased redness, drainage, pain, fever, chills, diaphoresis), they should call our office or proceed to the ER or Urgent Care. Patient should continue a high protein diet to facilitate wound healing  Patient is advised to not submerge wound or leave wound open to air. Follow up in 1 weeks  Given the multi-factorial nature of wound care, additional time was taken to review patient's treatment plan with other specialties and most recent pertinent lab work and imaging.    All plans of care discussed with patient at bedside who verbalized understanding with treatment plan. Wound 09/19/23 Traumatic Leg Left;Lateral (Active)   Wound Image Images linked 12/13/23 1341   Wound Description Beefy red;Yellow;Epithelialization;Granulation tissue 12/13/23 1341   Dorys-wound Assessment Dry;Hyperpigmented;Edema;Scaly 12/13/23 1341   Wound Length (cm) 1.5 cm 12/13/23 1341   Wound Width (cm) 1 cm 12/13/23 1341   Wound Depth (cm) 0.1 cm 12/13/23 1341   Wound Surface Area (cm^2) 1.5 cm^2 12/13/23 1341   Wound Volume (cm^3) 0.15 cm^3 12/13/23 1341   Calculated Wound Volume (cm^3) 0.15 cm^3 12/13/23 1341   Change in Wound Size % 94.44 12/13/23 1341   Drainage Amount Moderate 12/13/23 1341   Drainage Description Serosanguineous; Yellow 12/13/23 1341   Non-staged Wound Description Full thickness 12/13/23 1341   Wound packed? No 12/13/23 1341   Dressing Status Intact 12/13/23 1341       Wound 09/26/23 Traumatic Leg Right; Anterior (Active)   Wound Image Images linked 12/13/23 1337   Wound Description Granulation tissue;Slough 12/13/23 1337   Dorys-wound Assessment Dry;Hyperpigmented;Edema;Pink 12/13/23 1337   Wound Length (cm) 0.4 cm 12/13/23 1337   Wound Width (cm) 0.2 cm 12/13/23 1337   Wound Depth (cm) 0.2 cm 12/13/23 1337   Wound Surface Area (cm^2) 0.08 cm^2 12/13/23 1337   Wound Volume (cm^3) 0.016 cm^3 12/13/23 1337   Calculated Wound Volume (cm^3) 0.02 cm^3 12/13/23 1337   Change in Wound Size % 60 12/13/23 1337   Undermining 0.2 12/13/23 1337   Undermining is depth extending from 1-4 12/13/23 1337   Drainage Amount Small 12/13/23 1337   Drainage Description Serosanguineous 12/13/23 1337   Non-staged Wound Description Full thickness 12/13/23 1337   Wound packed?  No 12/13/23 1337   Dressing Status Intact 12/13/23 1337       Wound 09/19/23 Traumatic Leg Left;Lateral (Active)   Date First Assessed/Time First Assessed: 09/19/23 0925   Primary Wound Type: Traumatic  Location: Leg  Wound Location Orientation: Left;Lateral       Wound 09/26/23 Traumatic Leg Right; Anterior (Active)   Date First Assessed/Time First Assessed: 09/26/23 0950   Primary Wound Type: Traumatic  Location: Leg  Wound Location Orientation: Right; Anterior       [REMOVED] Wound 08/17/20 Knee Left (Removed)   Resolved Date: 09/19/23  Date First Assessed/Time First Assessed: 08/17/20 0936   Location: Knee  Wound Location Orientation: Left  Wound Description (Comments): darryl stocking  Incision's 1st Dressing: ADHESIVE SKIN CLOSR DERMABOND PRINEO, DRESSING MEP. .. [REMOVED] Wound 03/28/22 Knee Right (Removed)   Resolved Date: 09/19/23  Date First Assessed/Time First Assessed: 03/28/22 0854   Location: Knee  Wound Location Orientation: Right  Wound Description (Comments): DARRYL stockings applied to bilateral lower legs  Incision's 1st Dressing: ADHESIVE SKIN HI. .. [REMOVED] Wound 04/11/22 Surgical Knee Right (Removed)   Resolved Date: 09/19/23  Date First Assessed/Time First Assessed: 04/11/22 2000   Primary Wound Type: Surgical  Location: Knee  Wound Location Orientation: Right  Wound Outcome: Unknown (No longer present)       [REMOVED] Wound 09/05/23 Catheter entry/exit site Wrist Right (Removed)   Resolved Date: 09/19/23  Date First Assessed/Time First Assessed: 09/05/23 0921   Traumatic Wound Type: Catheter entry/exit site  Location: Wrist  Wound Location Orientation: Right  Wound Outcome: Unknown (No longer present)       [REMOVED] Wound 09/05/23 Skin Tear Pretibial Left; Outer (Removed)   Resolved Date: 09/19/23  Date First Assessed/Time First Assessed: 09/05/23 0730   Present on Original Admission: Yes  Primary Wound Type: Skin Tear  Location: Pretibial  Wound Location Orientation: Left; Outer  Wound Outcome: (c) Other (Comment)       Subjective:      .    12/13/23: VNA coming to the home and applying collagen as directed. Patient happy with wound healing. Denies any symptoms of infection today.     12/6/23: Since last visit patient was hospitalized for cellulitis and elevated INR. She was treated inpatient with vancomycin and discharged on cefadroxil. Notably it was noted that patient had coagulopathy while on Xarelto. During her hospitalization oncology was consulted and the decision was made to stop Xarelto until further evaluation is done of patient's coagulopathy. 11/29/23, 11/22/23: Happy with wound healing. No symptoms of infection. 11/15/23: Patient is happy with wound healing. Note skin tear on lateral left lower extremity. No symptoms of infection. 11/8/23: Patient is happy with wound healing. Notes that there is less drainage from the wound. No symptoms of infection today. 11/1/23, 10/24/23: Patient happy with wound healing. Wounds are looking better today. No symptoms of infection. 10/10/23: Wound care being performed by patient's son and VNA who are coming twice per week. Patient notes that she did have increased leg swelling since her last visit. Overall happy with wound healing     9/26/23: Dressing changes from VNA and her son. Denies any local or systemic symptoms of infection today. Happy with wound healing. Has been elevating legs. 9/19/23: Cooper Avila is a pleasant 65-year-old female with a past medical history of type 2 diabetes mellitus most recent A1c 5.8% 1 month ago, diabetic polyneuropathy, atrial  fibrillation on Xarelto, obesity, history of heart failure with most recent echocardiogram showing normal function here today for initial wound care consult. Per chart review patient sustained the wound of her left lower extremity on August 21, 2023 when she closed a car door on her leg. She went the next day to her PCP and was advised to apply warm compresses. She has been seen in the ED multiple times for wound checks. Initially she has been put on clindamycin and given Tdap vaccination.  Wound culture taken which grew 1+ staph coag negative, and later wound culture grew 1+ Staph epidermidis during inpatient stay. She was admitted for 3 days from 9/5 to 9/8/2023 at [facility] and underwent bedside I&D with placement of wound VAC by podiatry. Arterial duplex showed no significant arterial disease however PVR tracings were dampened. Great toe pressure  within healing range. Blood cultures were negative. She is instructed to follow-up outpatient with podiatry and VNA was set up for patient. Recently patient was seen again in ER on September 15, 2023 when there was concern again for infection of the wound. She was prescribed clindamycin and referred to wound management. Referral was placed by physician assistant Juan Osuna. Today patient continues to take clindamycin as directed. Denies any local or systemic signs of infection including fever chills diaphoresis. 9/5/23: LEADs  RIGHT LOWER LIMB:  No evidence of significant lower extremity arterial occlusive disease. Ankle/Brachial index: 2.11 which is unreliable due to non compressible vessels. PVR/ PPG tracings are dampened. Metatarsal pressure of 109 mmHg  Great toe pressure of 81 mmHg, within the healing range. LEFT LOWER LIMB:  No evidence of significant lower extremity arterial occlusive disease. Ankle/Brachial index: 2.11 which is unreliable due to non compressible vessels. PVR/ PPG tracings are dampened. Metatarsal pressure of 152 mmHg  Great toe pressure of 92 mmHg, within the healing range. The following portions of the patient's history were reviewed and updated as appropriate: allergies, current medications, past family history, past medical history, past social history, past surgical history, and problem list.    Review of Systems   Constitutional:  Negative for chills, diaphoresis, fatigue and fever. Skin:  Positive for wound. All other systems reviewed and are negative.         Objective:       Wound 09/19/23 Traumatic Leg Left;Lateral (Active)   Wound Image Images linked 12/13/23 1341   Wound Description Beefy red;Yellow;Epithelialization;Granulation tissue 12/13/23 1341   Dorys-wound Assessment Dry;Hyperpigmented;Edema;Scaly 12/13/23 1341   Wound Length (cm) 1.5 cm 12/13/23 1341   Wound Width (cm) 1 cm 12/13/23 1341   Wound Depth (cm) 0.1 cm 12/13/23 1341   Wound Surface Area (cm^2) 1.5 cm^2 12/13/23 1341   Wound Volume (cm^3) 0.15 cm^3 12/13/23 1341   Calculated Wound Volume (cm^3) 0.15 cm^3 12/13/23 1341   Change in Wound Size % 94.44 12/13/23 1341   Drainage Amount Moderate 12/13/23 1341   Drainage Description Serosanguineous; Yellow 12/13/23 1341   Non-staged Wound Description Full thickness 12/13/23 1341   Wound packed? No 12/13/23 1341   Dressing Status Intact 12/13/23 1341       Wound 09/26/23 Traumatic Leg Right; Anterior (Active)   Wound Image Images linked 12/13/23 1337   Wound Description Granulation tissue;Slough 12/13/23 1337   Dorys-wound Assessment Dry;Hyperpigmented;Edema;Pink 12/13/23 1337   Wound Length (cm) 0.4 cm 12/13/23 1337   Wound Width (cm) 0.2 cm 12/13/23 1337   Wound Depth (cm) 0.2 cm 12/13/23 1337   Wound Surface Area (cm^2) 0.08 cm^2 12/13/23 1337   Wound Volume (cm^3) 0.016 cm^3 12/13/23 1337   Calculated Wound Volume (cm^3) 0.02 cm^3 12/13/23 1337   Change in Wound Size % 60 12/13/23 1337   Undermining 0.2 12/13/23 1337   Undermining is depth extending from 1-4 12/13/23 1337   Drainage Amount Small 12/13/23 1337   Drainage Description Serosanguineous 12/13/23 1337   Non-staged Wound Description Full thickness 12/13/23 1337   Wound packed? No 12/13/23 1337   Dressing Status Intact 12/13/23 1337       /76   Pulse 101   Temp 98 °F (36.7 °C)   Resp 12     Physical Exam  Vitals reviewed. Constitutional:       Appearance: Normal appearance. HENT:      Head: Normocephalic and atraumatic. Mouth/Throat:      Mouth: Mucous membranes are moist.   Eyes:      Extraocular Movements: Extraocular movements intact.    Pulmonary:      Effort: Pulmonary effort is normal.   Musculoskeletal:      Right lower leg: Edema present. Left lower leg: Edema present. Skin:     Comments: Lateral lower extremity wound significantly smaller than last exam.  Serosanguineous exudate. No signs of infection. No erythema. Neurological:      Mental Status: She is alert. Psychiatric:         Mood and Affect: Mood normal.         Behavior: Behavior normal.                 Wound Instructions:  Orders Placed This Encounter   Procedures    Wound cleansing and dressings Traumatic Right; Anterior Leg     Left leg wounds:     Washed with soap and water. Rinsed with normal saline  Shower: Yes  Apply moisturizer to skin surrounding wound  Apply puracol AG to the wounds . Cover with ABD. Secure with rolled gauze and tape. Dressing to be changed three times per week. This was done today. Standing Status:   Future     Standing Expiration Date:   12/13/2024    Wound compression and edema control     Elastic Tubular Stocking: Spandagrip G to right and left lower legs     Tubular elastic bandage: Apply from base of toes to behind the knee. Apply in AM, may remove for sleep. Avoid prolonged standing in one place. Elevate leg(s) above the level of the heart when sitting or as much as possible. Standing Status:   Future     Standing Expiration Date:   12/13/2024    Wound home care     Continue A [id number] [facility]. 2 times weekly     Standing Status:   Future     Standing Expiration Date:   12/13/2024    Wound cleansing and dressings Traumatic Right; Anterior Leg     Wash your hands with soap and water. Remove old dressing, discard into plastic bag and place in trash. Cleanse the wound with soap and water prior to applying a clean dressing. Do not use tissue or cotton balls. Do not scrub the wound. Pat dry using gauze. Shower yes   Apply moisturize to skin surrounding wound  Apply Normal Gel AG to the wound. Cover with Bordered gauze.   Change dressing 3 times weekly. This was done today. Standing Status:   Future     Standing Expiration Date:   12/13/2024    Debridement     This order was created via procedure documentation    Debridement     This order was created via procedure documentation        Diagnosis ICD-10-CM Associated Orders   1. Traumatic open wound of right lower leg, initial encounter  S81.801A lidocaine (XYLOCAINE) 4 % topical solution 5 mL     Wound cleansing and dressings Traumatic Right; Anterior Leg     Wound home care     Wound cleansing and dressings Traumatic Right; Anterior Leg     Debridement      2. Traumatic open wound of left lower leg, initial encounter  S81.802A lidocaine (XYLOCAINE) 4 % topical solution 5 mL     Wound compression and edema control     Wound home care     Wound cleansing and dressings Traumatic Right; Anterior Leg     Debridement      3. Lymphedema of both lower extremities  I89.0       4. 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[address])  I70.209       5. Chronic anticoagulation  Z79.01       6. Lipedema  R60.9           --  Esther Holcomb MD    "This note has been constructed using a voice recognition system. Therefore there may be syntax, spelling, and/or grammatical errors. Occasional wrong word or "sound alike" substitutions may have occurred due to the inherent limitations of voice recognition software. Read the chart carefully and recognize, using context, where substitutions have occurred.  Please call if you have any questions."

## 2023-12-14 ENCOUNTER — APPOINTMENT (OUTPATIENT)
Dept: LAB | Facility: CLINIC | Age: 83
End: 2023-12-14
Payer: MEDICARE

## 2023-12-14 ENCOUNTER — OFFICE VISIT (OUTPATIENT)
Dept: INTERNAL MEDICINE CLINIC | Facility: CLINIC | Age: 83
End: 2023-12-14
Payer: MEDICARE

## 2023-12-14 VITALS
TEMPERATURE: 96.6 F | HEART RATE: 100 BPM | SYSTOLIC BLOOD PRESSURE: 110 MMHG | OXYGEN SATURATION: 99 % | BODY MASS INDEX: 37.39 KG/M2 | WEIGHT: 219 LBS | HEIGHT: 64 IN | DIASTOLIC BLOOD PRESSURE: 78 MMHG

## 2023-12-14 DIAGNOSIS — I50.32 CHRONIC DIASTOLIC CHF (CONGESTIVE HEART FAILURE) (HCC): ICD-10-CM

## 2023-12-14 DIAGNOSIS — K21.9 GASTROESOPHAGEAL REFLUX DISEASE: ICD-10-CM

## 2023-12-14 DIAGNOSIS — S81.802A NON-HEALING WOUND OF LEFT LOWER EXTREMITY: ICD-10-CM

## 2023-12-14 DIAGNOSIS — E11.42 DIABETIC POLYNEUROPATHY ASSOCIATED WITH TYPE 2 DIABETES MELLITUS (HCC): ICD-10-CM

## 2023-12-14 DIAGNOSIS — G25.81 RLS (RESTLESS LEGS SYNDROME): ICD-10-CM

## 2023-12-14 DIAGNOSIS — K21.9 GASTROESOPHAGEAL REFLUX DISEASE, UNSPECIFIED WHETHER ESOPHAGITIS PRESENT: ICD-10-CM

## 2023-12-14 DIAGNOSIS — I10 ESSENTIAL HYPERTENSION: ICD-10-CM

## 2023-12-14 DIAGNOSIS — I35.0 AORTIC STENOSIS, SEVERE: ICD-10-CM

## 2023-12-14 DIAGNOSIS — N18.31 STAGE 3A CHRONIC KIDNEY DISEASE (HCC): ICD-10-CM

## 2023-12-14 DIAGNOSIS — L03.115 CELLULITIS OF RIGHT LEG: Primary | ICD-10-CM

## 2023-12-14 DIAGNOSIS — K22.719 BARRETT'S ESOPHAGUS WITH DYSPLASIA: ICD-10-CM

## 2023-12-14 DIAGNOSIS — I48.19 PERSISTENT ATRIAL FIBRILLATION (HCC): ICD-10-CM

## 2023-12-14 DIAGNOSIS — J30.2 SEASONAL ALLERGIES: ICD-10-CM

## 2023-12-14 LAB
ANION GAP SERPL CALCULATED.3IONS-SCNC: 6 MMOL/L
BUN SERPL-MCNC: 27 MG/DL (ref 5–25)
CALCIUM SERPL-MCNC: 10 MG/DL (ref 8.4–10.2)
CHLORIDE SERPL-SCNC: 99 MMOL/L (ref 96–108)
CO2 SERPL-SCNC: 33 MMOL/L (ref 21–32)
CREAT SERPL-MCNC: 0.95 MG/DL (ref 0.6–1.3)
GFR SERPL CREATININE-BSD FRML MDRD: 56 ML/MIN/1.73SQ M
GLUCOSE P FAST SERPL-MCNC: 89 MG/DL (ref 65–99)
POTASSIUM SERPL-SCNC: 4.2 MMOL/L (ref 3.5–5.3)
SODIUM SERPL-SCNC: 138 MMOL/L (ref 135–147)

## 2023-12-14 PROCEDURE — 36415 COLL VENOUS BLD VENIPUNCTURE: CPT

## 2023-12-14 PROCEDURE — 99495 TRANSJ CARE MGMT MOD F2F 14D: CPT | Performed by: INTERNAL MEDICINE

## 2023-12-14 PROCEDURE — 80048 BASIC METABOLIC PNL TOTAL CA: CPT

## 2023-12-14 RX ORDER — PANTOPRAZOLE SODIUM 20 MG/1
20 TABLET, DELAYED RELEASE ORAL EVERY MORNING
Qty: 90 TABLET | Refills: 1 | Status: SHIPPED | OUTPATIENT
Start: 2023-12-14 | End: 2023-12-20 | Stop reason: SDUPTHER

## 2023-12-14 RX ORDER — FAMOTIDINE 40 MG/1
40 TABLET, FILM COATED ORAL EVERY EVENING
Qty: 90 TABLET | Refills: 1 | Status: SHIPPED | OUTPATIENT
Start: 2023-12-14 | End: 2023-12-20 | Stop reason: SDUPTHER

## 2023-12-14 NOTE — ASSESSMENT & PLAN NOTE
Using Flonase at least once a day, may continue with daily antihistamine. With recent voice changes, discussed ENT referral.  Reflux treatment as above.

## 2023-12-14 NOTE — ASSESSMENT & PLAN NOTE
Wt Readings from Last 3 Encounters:   12/14/23 99.3 kg (219 lb)   12/07/23 99.8 kg (220 lb)   12/05/23 100 kg (220 lb 9.6 oz)     Stable. On daily torsemide.

## 2023-12-14 NOTE — PROGRESS NOTES
Assessment & Plan     1. Cellulitis of right leg  Assessment & Plan:  Completed antibiotics. Would healing. Followed at wound center. 2. Non-healing wound of left lower extremity  Assessment & Plan:  Improving. Follow up at wound center. 3. Stage 3a chronic kidney disease [facility])  Assessment & Plan:  Lab Results   Component Value Date    EGFR 46 12/04/2023    EGFR 64 12/03/2023    EGFR 53 12/02/2023    CREATININE 1.13 12/04/2023    CREATININE 0.86 12/03/2023    CREATININE 1.00 12/02/2023     Repeat labs today. 4. Chronic diastolic CHF (congestive heart failure) ([facility])  Assessment & Plan:  Wt Readings from Last 3 Encounters:   12/14/23 99.3 kg (219 lb)   12/07/23 99.8 kg (220 lb)   12/05/23 100 kg (220 lb 9.6 oz)     Stable. On daily torsemide. 5. Essential hypertension  Assessment & Plan:  BP stable. On metoprolol and torsemide. Orders:  -     Basic metabolic panel; Future    6. RLS (restless legs syndrome)  Assessment & Plan: On pramipexole, gabapentin. 7. Persistent atrial fibrillation [facility])  Assessment & Plan: On metoprolol. Restarted Xarelto. Follow up with cardiology. 8. Carrero's esophagus with dysplasia  Assessment & Plan:  Restart pantoprazole in AM, take famotidine in PM.      9. Diabetic polyneuropathy associated with type 2 diabetes mellitus ([address])  Assessment & Plan:    Lab Results   Component Value Date    HGBA1C 5.8 (H) 08/22/2023     A1c stable. 10. Aortic stenosis, severe  Assessment & Plan:  Dyspnea stable. Sees cardiology. 11. Seasonal allergies  Assessment & Plan:  Using Flonase at least once a day, may continue with daily antihistamine. With recent voice changes, discussed ENT referral.  Reflux treatment as above. 12. Gastroesophageal reflux disease  -     famotidine (PEPCID) 40 MG tablet; Take 1 tablet (40 mg total) by mouth every evening    13.  Gastroesophageal reflux disease, unspecified whether esophagitis present  -     pantoprazole (PROTONIX) 20 mg tablet; Take 1 tablet (20 mg total) by mouth every morning       Subjective     Transitional Care Management Review:   Sony Ortiz is a 82 y.o. female here for TCM follow up. During the TCM phone call patient stated:  TCM Call     Date and time call was made  12/5/2023 11:20 AM    Hospital care reviewed  Records reviewed    Patient was hospitialized at  [address] [place] [address]    Date of Admission  12/02/23    Date of discharge  12/04/23    Diagnosis  CELLULITIS    Disposition  Home    Were the patients medications reviewed and updated  Yes    Current Symptoms  None      TCM Call     Post hospital issues  None    Should patient be enrolled in anticoag monitoring? No    Scheduled for follow up? Yes    Did you obtain your prescribed medications  Yes    Do you need help managing your prescriptions or medications  No    Is transportation to your appointment needed  No    I have advised the patient to call PCP with any new or worsening symptoms  [facility] or [facility] other    Support System  Family; Spouse    The type of support provided  None    Do you have social support  Yes, as much as I need    Are you recieving any outpatient services  No    Are you recieving home care services  No    Are you using any community resources  No    Counseling  Patient        She was at the wound center recently. She reports her right knee wound is healing. Her left leg is healing slowly. She is using the compression stockings daily. She is happy that it is healing now. She has had this wound since 4 months ago. She has been eating more peanut butter and other protein rich foods to help her wounds heal.  She reports that she had lost weight due to her multiple hospitalizations. She is back on Xarelto, no recent bleeding. She feels her stomach is not as good as before. She was not taking pantoprazole regularly, not sure if she was taking famotidine either.   No nausea or vomiting. She moves her bowels almost daily. She is still having issues with her postnasal drip and occasional cough. She feels it is better. She noticed that her voice will change sometimes, feels hoarse occasionally. Denies any reflux symptoms. Review of Systems   Constitutional:  Negative for activity change and appetite change. HENT:  Positive for rhinorrhea and voice change. Negative for congestion, postnasal drip, sinus pain and trouble swallowing. Respiratory:  Positive for shortness of breath. Negative for cough and chest tightness. Cardiovascular:  Negative for chest pain. Gastrointestinal:  Negative for abdominal pain and nausea. Genitourinary:  Negative for dysuria. Neurological:  Negative for dizziness and headaches. Objective     /78   Pulse 100   Temp (!) 96.6 °F (35.9 °C)   Ht 5' 4" (1.626 m)   Wt 99.3 kg (219 lb)   SpO2 99%   BMI 37.59 kg/m²      Physical Exam  Vitals and nursing note reviewed. Constitutional:       General: She is not in acute distress. Appearance: She is well-developed. HENT:      Head: Normocephalic and atraumatic. Nose: No congestion or rhinorrhea. Eyes:      Conjunctiva/sclera: Conjunctivae normal.   Cardiovascular:      Rate and Rhythm: Normal rate and regular rhythm. Heart sounds: No murmur heard. Pulmonary:      Effort: Pulmonary effort is normal. No respiratory distress. Breath sounds: Normal breath sounds. Abdominal:      Palpations: Abdomen is soft. Tenderness: There is no abdominal tenderness. Musculoskeletal:         General: No swelling. Cervical back: Neck supple. Skin:     General: Skin is warm and dry. Findings: Wound present. No erythema. Comments: Both LE wrapped with Tubigrip stockings   Neurological:      Mental Status: She is alert.    Psychiatric:         Mood and Affect: Mood normal.       Medications have been reviewed by provider in current encounter    Sherin Toribio Edwardo Esqueda MD     Labs & imaging results reviewed with patient.

## 2023-12-14 NOTE — ASSESSMENT & PLAN NOTE
Lab Results   Component Value Date    EGFR 46 12/04/2023    EGFR 64 12/03/2023    EGFR 53 12/02/2023    CREATININE 1.13 12/04/2023    CREATININE 0.86 12/03/2023    CREATININE 1.00 12/02/2023     Repeat labs today.

## 2023-12-19 NOTE — ASSESSMENT & PLAN NOTE
· Continue on vitamin-D What Type Of Note Output Would You Prefer (Optional)?: Bullet Format Hpi Title: Evaluation of Skin Lesions Additional History: C/o sensitive  lesion of the lt pre-auricular present for a few months.

## 2023-12-20 ENCOUNTER — OFFICE VISIT (OUTPATIENT)
Dept: WOUND CARE | Facility: HOSPITAL | Age: 83
End: 2023-12-20
Payer: MEDICARE

## 2023-12-20 VITALS
SYSTOLIC BLOOD PRESSURE: 119 MMHG | DIASTOLIC BLOOD PRESSURE: 81 MMHG | HEART RATE: 87 BPM | RESPIRATION RATE: 18 BRPM | TEMPERATURE: 97.2 F

## 2023-12-20 DIAGNOSIS — Z79.01 CHRONIC ANTICOAGULATION: ICD-10-CM

## 2023-12-20 DIAGNOSIS — R60.9 LIPEDEMA: ICD-10-CM

## 2023-12-20 DIAGNOSIS — S81.802A TRAUMATIC OPEN WOUND OF LEFT LOWER LEG, INITIAL ENCOUNTER: ICD-10-CM

## 2023-12-20 DIAGNOSIS — S81.801A TRAUMATIC OPEN WOUND OF RIGHT LOWER LEG, INITIAL ENCOUNTER: Primary | ICD-10-CM

## 2023-12-20 DIAGNOSIS — I70.209 PERIPHERAL ARTERIOSCLEROSIS (HCC): ICD-10-CM

## 2023-12-20 DIAGNOSIS — I89.0 LYMPHEDEMA OF BOTH LOWER EXTREMITIES: ICD-10-CM

## 2023-12-20 PROCEDURE — 97597 DBRDMT OPN WND 1ST 20 CM/<: CPT | Performed by: STUDENT IN AN ORGANIZED HEALTH CARE EDUCATION/TRAINING PROGRAM

## 2023-12-20 RX ORDER — PANTOPRAZOLE SODIUM 20 MG/1
20 TABLET, DELAYED RELEASE ORAL EVERY MORNING
Qty: 90 TABLET | Refills: 1 | Status: SHIPPED | OUTPATIENT
Start: 2023-12-20

## 2023-12-20 RX ORDER — FAMOTIDINE 40 MG/1
40 TABLET, FILM COATED ORAL EVERY EVENING
Qty: 90 TABLET | Refills: 1 | Status: SHIPPED | OUTPATIENT
Start: 2023-12-20

## 2023-12-20 RX ORDER — LIDOCAINE HYDROCHLORIDE 40 MG/ML
5 SOLUTION TOPICAL ONCE
Status: COMPLETED | OUTPATIENT
Start: 2023-12-20 | End: 2023-12-20

## 2023-12-20 RX ADMIN — LIDOCAINE HYDROCHLORIDE 5 ML: 40 SOLUTION TOPICAL at 14:29

## 2023-12-20 NOTE — PROGRESS NOTES
"Patient ID: Natividad Ortiz is a 82 y.o. female Date of Birth 1940     Chief Complaint  Chief Complaint   Patient presents with    Follow Up Wound Care Visit     BLE wounds       Allergies  Latex, Duloxetine hcl, Erythromycin, Levofloxacin, Penicillins, Savella [milnacipran], and Sulfa antibiotics    Assessment:     Diagnoses and all orders for this visit:    Traumatic open wound of right lower leg, initial encounter  -     lidocaine (XYLOCAINE) 4 % topical solution 5 mL  -     Wound cleansing and dressings Traumatic Left;Lateral Leg; Future  -     Wound compression and edema control; Future  -     Wound home care; Future  -     Wound cleansing and dressings Traumatic Right;Anterior Leg; Future  -     Debridement    Traumatic open wound of left lower leg, initial encounter  -     lidocaine (XYLOCAINE) 4 % topical solution 5 mL  -     Wound cleansing and dressings Traumatic Left;Lateral Leg; Future  -     Wound compression and edema control; Future  -     Wound home care; Future  -     Wound cleansing and dressings Traumatic Right;Anterior Leg; Future  -     Debridement    Lymphedema of both lower extremities    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HCC)    Chronic anticoagulation    Lipedema              Debridement   Wound 09/19/23 Traumatic Leg Left;Lateral    Universal Protocol:  Consent: Verbal consent obtained.  Risks and benefits: risks, benefits and alternatives were discussed  Consent given by: patient  Time out: Immediately prior to procedure a \"time out\" was called to verify the correct patient, procedure, equipment, support staff and site/side marked as required.  Patient identity confirmed: verbally with patient    Debridement Details  Performed by: physician  Debridement type: selective  Pain control: lidocaine 4%      Post-debridement measurements  Length (cm): 1.2  Width (cm): 0.5  Depth (cm): 0.1  Percent debrided: 90%  Surface Area (cm^2): 0.6  Area Debrided (cm^2): 0.54  Volume (cm^3): " "0.06    Devitalized tissue debrided: biofilm and exudate  Instrument(s) utilized: curette  Bleeding: small  Hemostasis obtained with: pressure  Procedural pain (0-10): 1  Post-procedural pain: 0   Response to treatment: procedure was tolerated well    Debridement   Wound 09/26/23 Traumatic Leg Right;Anterior    Universal Protocol:  Consent: Verbal consent obtained.  Risks and benefits: risks, benefits and alternatives were discussed  Consent given by: patient  Time out: Immediately prior to procedure a \"time out\" was called to verify the correct patient, procedure, equipment, support staff and site/side marked as required.  Patient identity confirmed: verbally with patient    Debridement Details  Performed by: physician  Debridement type: selective  Pain control: lidocaine 4%      Post-debridement measurements  Length (cm): 0.3  Width (cm): 0.2  Depth (cm): 0.1  Percent debrided: 90%  Surface Area (cm^2): 0.06  Area Debrided (cm^2): 0.05  Volume (cm^3): 0.01    Devitalized tissue debrided: biofilm and exudate  Instrument(s) utilized: curette  Bleeding: small  Hemostasis obtained with: pressure  Procedural pain (0-10): 1  Post-procedural pain: 0   Response to treatment: procedure was tolerated well        Plan:  It was a pleasure to see Natividad Ortiz for wound care follow up today  Selective debridement performed today as above  Wounds are improving   Continue plan of care as noted below with collagen to LLE wound and normlgel to RLE wound   A1C results reviewed with the patient today.   No signs or symptoms of infection today. Patient understands that if any signs of infection start (such as increased redness, drainage, pain, fever, chills, diaphoresis), they should call our office or proceed to the ER or Urgent Care.  Patient should continue a high protein diet to facilitate wound healing  Patient is advised to not submerge wound or leave wound open to air.  Follow up in 2 weeks  Given the multi-factorial " nature of wound care, additional time was taken to review patient's treatment plan with other specialties and most recent pertinent lab work and imaging.   All plans of care discussed with patient at bedside who verbalized understanding with treatment plan.       Wound 09/19/23 Traumatic Leg Left;Lateral (Active)   Wound Image Images linked 12/20/23 1403   Wound Description Beefy red;Epithelialization;Granulation tissue;Hypergranulation 12/20/23 1402   Dorys-wound Assessment Hyperpigmented;Edema;Maceration 12/20/23 1402   Wound Length (cm) 1.2 cm 12/20/23 1402   Wound Width (cm) 0.5 cm 12/20/23 1402   Wound Depth (cm) 0.1 cm 12/20/23 1402   Wound Surface Area (cm^2) 0.6 cm^2 12/20/23 1402   Wound Volume (cm^3) 0.06 cm^3 12/20/23 1402   Calculated Wound Volume (cm^3) 0.06 cm^3 12/20/23 1402   Change in Wound Size % 97.78 12/20/23 1402   Drainage Amount Large 12/20/23 1402   Drainage Description Serosanguineous;Yellow 12/20/23 1402   Non-staged Wound Description Full thickness 12/20/23 1402   Dressing Status Intact 12/20/23 1402       Wound 09/26/23 Traumatic Leg Right;Anterior (Active)   Wound Image Images linked 12/20/23 1405   Wound Description Epithelialization;Eschar 12/20/23 1405   Wound Length (cm) 0.3 cm 12/20/23 1405   Wound Width (cm) 0.2 cm 12/20/23 1405   Wound Depth (cm) 0 cm 12/20/23 1405   Wound Surface Area (cm^2) 0.06 cm^2 12/20/23 1405   Wound Volume (cm^3) 0 cm^3 12/20/23 1405   Calculated Wound Volume (cm^3) 0 cm^3 12/20/23 1405   Change in Wound Size % 100 12/20/23 1405   Drainage Amount None 12/20/23 1405   Non-staged Wound Description Not applicable 12/20/23 1405   Dressing Status Intact 12/20/23 1405       Wound 09/19/23 Traumatic Leg Left;Lateral (Active)   Date First Assessed/Time First Assessed: 09/19/23 0938   Primary Wound Type: Traumatic  Location: Leg  Wound Location Orientation: Left;Lateral       Wound 09/26/23 Traumatic Leg Right;Anterior (Active)   Date First Assessed/Time First  Assessed: 09/26/23 0950   Primary Wound Type: Traumatic  Location: Leg  Wound Location Orientation: Right;Anterior       [REMOVED] Wound 08/17/20 Knee Left (Removed)   Resolved Date: 09/19/23  Date First Assessed/Time First Assessed: 08/17/20 0936   Location: Knee  Wound Location Orientation: Left  Wound Description (Comments): darryl stocking  Incision's 1st Dressing: ADHESIVE SKIN CLOSR DERMABOND PRINEO, DRESSING MEP...       [REMOVED] Wound 03/28/22 Knee Right (Removed)   Resolved Date: 09/19/23  Date First Assessed/Time First Assessed: 03/28/22 0854   Location: Knee  Wound Location Orientation: Right  Wound Description (Comments): DARRYL stockings applied to bilateral lower legs  Incision's 1st Dressing: ADHESIVE SKIN HI...       [REMOVED] Wound 04/11/22 Surgical Knee Right (Removed)   Resolved Date: 09/19/23  Date First Assessed/Time First Assessed: 04/11/22 2000   Primary Wound Type: Surgical  Location: Knee  Wound Location Orientation: Right  Wound Outcome: Unknown (No longer present)       [REMOVED] Wound 09/05/23 Catheter entry/exit site Wrist Right (Removed)   Resolved Date: 09/19/23  Date First Assessed/Time First Assessed: 09/05/23 0921   Traumatic Wound Type: Catheter entry/exit site  Location: Wrist  Wound Location Orientation: Right  Wound Outcome: Unknown (No longer present)       [REMOVED] Wound 09/05/23 Skin Tear Pretibial Left;Outer (Removed)   Resolved Date: 09/19/23  Date First Assessed/Time First Assessed: 09/05/23 0730   Present on Original Admission: Yes  Primary Wound Type: Skin Tear  Location: Pretibial  Wound Location Orientation: Left;Outer  Wound Outcome: (c) Other (Comment)       Subjective:      .    12/20/23, 12/13/23: VNA coming to the home and applying collagen as directed.  Patient happy with wound healing.  Denies any symptoms of infection today.     12/6/23: Since last visit patient was hospitalized for cellulitis and elevated INR.  She was treated inpatient with vancomycin and  discharged on cefadroxil.  Notably it was noted that patient had coagulopathy while on Xarelto.  During her hospitalization oncology was consulted and the decision was made to stop Xarelto until further evaluation is done of patient's coagulopathy.      11/29/23, 11/22/23: Happy with wound healing. No symptoms of infection.     11/15/23: Patient is happy with wound healing.  Note skin tear on lateral left lower extremity.  No symptoms of infection.     11/8/23: Patient is happy with wound healing.  Notes that there is less drainage from the wound.  No symptoms of infection today.     11/1/23, 10/24/23: Patient happy with wound healing.  Wounds are looking better today.  No symptoms of infection.     10/10/23: Wound care being performed by patient's son and VNA who are coming twice per week.  Patient notes that she did have increased leg swelling since her last visit.  Overall happy with wound healing     9/26/23: Dressing changes from VNA and her son.  Denies any local or systemic symptoms of infection today.  Happy with wound healing.  Has been elevating legs.     9/19/23: Consult - Natividad is a pleasant 82-year-old female with a past medical history of type 2 diabetes mellitus most recent A1c 5.8% 1 month ago, diabetic polyneuropathy, atrial  fibrillation on Xarelto, obesity, history of heart failure with most recent echocardiogram showing normal function here today for initial wound care consult.  Per chart review patient sustained the wound of her left lower extremity on August 21, 2023 when she closed a car door on her leg.  She went the next day to her PCP and was advised to apply warm compresses.  She has been seen in the ED multiple times for wound checks.  Initially she has been put on clindamycin and given Tdap vaccination. Wound culture taken which grew 1+ staph coag negative, and later wound culture grew 1+ Staph epidermidis during inpatient stay.  She was admitted for 3 days from 9/5 to 9/8/2023 at [facility] and underwent bedside I&D with placement of wound VAC by podiatry.  Arterial duplex showed no significant arterial disease however PVR tracings were dampened.  Great toe pressure  within healing range.  Blood cultures were negative.  She is instructed to follow-up outpatient with podiatry and VNA was set up for patient.  Recently patient was seen again in ER on September 15, 2023 when there was concern again for infection of the wound.  She was prescribed clindamycin and referred to wound management.  Referral was placed by physician assistant Susi Horn.  Today patient continues to take clindamycin as directed.  Denies any local or systemic signs of infection including fever chills diaphoresis.     9/5/23: LEADs  RIGHT LOWER LIMB:  No evidence of significant lower extremity arterial occlusive disease.  Ankle/Brachial index: 2.11 which is unreliable due to non compressible vessels.  PVR/ PPG tracings are dampened.  Metatarsal pressure of 109 mmHg  Great toe pressure of 81 mmHg, within the healing range.     LEFT LOWER LIMB:  No evidence of significant lower extremity arterial occlusive disease.  Ankle/Brachial index: 2.11 which is unreliable due to non compressible vessels.  PVR/ PPG tracings are dampened.  Metatarsal pressure of 152 mmHg  Great toe pressure of 92 mmHg, within the healing range.          The following portions of the patient's history were reviewed and updated as appropriate: allergies, current medications, past family history, past medical history, past social history, past surgical history, and problem list.    Review of Systems   Constitutional:  Negative for chills, diaphoresis, fatigue and fever.   Skin:  Positive for wound.   All other systems reviewed and are negative.        Objective:       Wound 09/19/23 Traumatic Leg Left;Lateral (Active)   Wound Image Images linked 12/20/23 1403   Wound Description Beefy red;Epithelialization;Granulation  tissue;Hypergranulation 12/20/23 1402   Dorys-wound Assessment Hyperpigmented;Edema;Maceration 12/20/23 1402   Wound Length (cm) 1.2 cm 12/20/23 1402   Wound Width (cm) 0.5 cm 12/20/23 1402   Wound Depth (cm) 0.1 cm 12/20/23 1402   Wound Surface Area (cm^2) 0.6 cm^2 12/20/23 1402   Wound Volume (cm^3) 0.06 cm^3 12/20/23 1402   Calculated Wound Volume (cm^3) 0.06 cm^3 12/20/23 1402   Change in Wound Size % 97.78 12/20/23 1402   Drainage Amount Large 12/20/23 1402   Drainage Description Serosanguineous;Yellow 12/20/23 1402   Non-staged Wound Description Full thickness 12/20/23 1402   Dressing Status Intact 12/20/23 1402       Wound 09/26/23 Traumatic Leg Right;Anterior (Active)   Wound Image Images linked 12/20/23 1405   Wound Description Epithelialization;Eschar 12/20/23 1405   Wound Length (cm) 0.3 cm 12/20/23 1405   Wound Width (cm) 0.2 cm 12/20/23 1405   Wound Depth (cm) 0 cm 12/20/23 1405   Wound Surface Area (cm^2) 0.06 cm^2 12/20/23 1405   Wound Volume (cm^3) 0 cm^3 12/20/23 1405   Calculated Wound Volume (cm^3) 0 cm^3 12/20/23 1405   Change in Wound Size % 100 12/20/23 1405   Drainage Amount None 12/20/23 1405   Non-staged Wound Description Not applicable 12/20/23 1405   Dressing Status Intact 12/20/23 1405       /81   Pulse 87   Temp (!) 97.2 °F (36.2 °C)   Resp 18     Physical Exam  Vitals reviewed.   Constitutional:       Appearance: Normal appearance.   HENT:      Head: Normocephalic and atraumatic.      Mouth/Throat:      Mouth: Mucous membranes are moist.   Eyes:      Extraocular Movements: Extraocular movements intact.   Pulmonary:      Effort: Pulmonary effort is normal.   Musculoskeletal:      Right lower leg: Edema present.      Left lower leg: Edema present.   Skin:     Comments: Bilateral lower extremity wound smaller than last exam.  Healthy wound bed.  Some minimal maceration to lateral left lower extremity wound.  No signs of infection.   Neurological:      Mental Status: She is alert.    Psychiatric:         Mood and Affect: Mood normal.         Behavior: Behavior normal.                 Wound Instructions:  Orders Placed This Encounter   Procedures    Wound cleansing and dressings Traumatic Left;Lateral Leg     Left leg wounds:     Washed with soap and water.  Rinsed with normal saline  Shower: Yes  Apply Hydraguard to skin surrounding wound  Apply puracol AG to the wounds .  Cover with ABD.  Secure with rolled gauze and tape.     Dressing to be changed three times per week.     This was done today.     Standing Status:   Future     Standing Expiration Date:   12/20/2024    Wound compression and edema control     Elastic Tubular Stocking: Spandagrip G to right and left lower legs     Tubular elastic bandage: Apply from base of toes to behind the knee. Apply in AM, may remove for sleep.     Avoid prolonged standing in one place.     Elevate leg(s) above the level of the heart when sitting or as much as possible.      ·     Standing Status:   Future     Standing Expiration Date:   12/20/2024    Wound home care     Continue [facility] 2 times weekly     Standing Status:   Future     Standing Expiration Date:   12/20/2024    Wound cleansing and dressings Traumatic Right;Anterior Leg     Traumatic Right;Anterior Leg      Wash your hands with soap and water.  Remove old dressing, discard into plastic bag and place in trash.  Cleanse the wound with soap and water prior to applying a clean dressing. Do not use tissue or cotton balls. Do not scrub the wound. Pat dry using gauze.  Shower yes   Apply moisturize to skin surrounding wound  Apply Normal Gel AG to the wound.  Cover with Bordered gauze.  Change dressing 3 times weekly.  This was done today.     Standing Status:   Future     Standing Expiration Date:   12/20/2024    Debridement     This order was created via procedure documentation    Debridement     This order was created via procedure documentation        Diagnosis ICD-10-CM  "Associated Orders   1. Traumatic open wound of right lower leg, initial encounter  S81.801A lidocaine (XYLOCAINE) 4 % topical solution 5 mL     Wound cleansing and dressings Traumatic Left;Lateral Leg     Wound compression and edema control     Wound home care     Wound cleansing and dressings Traumatic Right;Anterior Leg     Debridement      2. Traumatic open wound of left lower leg, initial encounter  S81.802A lidocaine (XYLOCAINE) 4 % topical solution 5 mL     Wound cleansing and dressings Traumatic Left;Lateral Leg     Wound compression and edema control     Wound home care     Wound cleansing and dressings Traumatic Right;Anterior Leg     Debridement      3. Lymphedema of both lower extremities  I89.0       4. 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HCC)  I70.209       5. Chronic anticoagulation  Z79.01       6. Lipedema  R60.9         --  Talia Damico MD    \"This note has been constructed using a voice recognition system. Therefore there may be syntax, spelling, and/or grammatical errors. Occasional wrong word or \"sound alike\" substitutions may have occurred due to the inherent limitations of voice recognition software. Read the chart carefully and recognize, using context, where substitutions have occurred. Please call if you have any questions.\"       "

## 2023-12-20 NOTE — PATIENT INSTRUCTIONS
Orders Placed This Encounter   Procedures    Wound cleansing and dressings Traumatic Left;Lateral Leg     Left leg wounds:     Washed with soap and water.  Rinsed with normal saline  Shower: Yes  Apply Hydraguard to skin surrounding wound  Apply puracol AG to the wounds .  Cover with ABD.  Secure with rolled gauze and tape.     Dressing to be changed three times per week.     This was done today.     Standing Status:   Future     Standing Expiration Date:   12/20/2024    Wound compression and edema control     Elastic Tubular Stocking: Spandagrip G to right and left lower legs     Tubular elastic bandage: Apply from base of toes to behind the knee. Apply in AM, may remove for sleep.     Avoid prolonged standing in one place.     Elevate leg(s) above the level of the heart when sitting or as much as possible.      ·     Standing Status:   Future     Standing Expiration Date:   12/20/2024    Wound home care     Continue [facility] 2 times weekly     Standing Status:   Future     Standing Expiration Date:   12/20/2024    Wound cleansing and dressings Traumatic Right;Anterior Leg     Traumatic Right;Anterior Leg      Wash your hands with soap and water.  Remove old dressing, discard into plastic bag and place in trash.  Cleanse the wound with soap and water prior to applying a clean dressing. Do not use tissue or cotton balls. Do not scrub the wound. Pat dry using gauze.  Shower yes   Apply moisturize to skin surrounding wound  Apply Normal Gel AG to the wound.  Cover with Bordered gauze.  Change dressing 3 times weekly.  This was done today.     Standing Status:   Future     Standing Expiration Date:   12/20/2024

## 2024-01-01 ENCOUNTER — APPOINTMENT (INPATIENT)
Dept: RADIOLOGY | Facility: HOSPITAL | Age: 84
DRG: 219 | End: 2024-01-01
Payer: MEDICARE

## 2024-01-01 ENCOUNTER — APPOINTMENT (OUTPATIENT)
Dept: RADIOLOGY | Facility: HOSPITAL | Age: 84
DRG: 219 | End: 2024-01-01
Payer: MEDICARE

## 2024-01-01 ENCOUNTER — HOME CARE VISIT (OUTPATIENT)
Dept: HOME HOSPICE | Facility: HOSPICE | Age: 84
End: 2024-01-01
Payer: MEDICARE

## 2024-01-01 ENCOUNTER — HOSPITAL ENCOUNTER (INPATIENT)
Facility: HOSPITAL | Age: 84
LOS: 16 days | DRG: 219 | End: 2024-03-06
Attending: THORACIC SURGERY (CARDIOTHORACIC VASCULAR SURGERY) | Admitting: THORACIC SURGERY (CARDIOTHORACIC VASCULAR SURGERY)
Payer: MEDICARE

## 2024-01-01 ENCOUNTER — HOSPICE ADMISSION (OUTPATIENT)
Dept: HOME HOSPICE | Facility: HOSPICE | Age: 84
End: 2024-01-01
Payer: MEDICARE

## 2024-01-01 ENCOUNTER — APPOINTMENT (OUTPATIENT)
Dept: NON INVASIVE DIAGNOSTICS | Facility: HOSPITAL | Age: 84
DRG: 219 | End: 2024-01-01
Attending: THORACIC SURGERY (CARDIOTHORACIC VASCULAR SURGERY)
Payer: MEDICARE

## 2024-01-01 VITALS
HEART RATE: 68 BPM | OXYGEN SATURATION: 98 % | HEIGHT: 64 IN | WEIGHT: 240.3 LBS | SYSTOLIC BLOOD PRESSURE: 112 MMHG | BODY MASS INDEX: 41.03 KG/M2 | TEMPERATURE: 97.5 F | RESPIRATION RATE: 17 BRPM | DIASTOLIC BLOOD PRESSURE: 66 MMHG

## 2024-01-01 DIAGNOSIS — I35.0 NONRHEUMATIC AORTIC VALVE STENOSIS: ICD-10-CM

## 2024-01-01 DIAGNOSIS — N19 UREMIA: ICD-10-CM

## 2024-01-01 DIAGNOSIS — N17.9 AKI (ACUTE KIDNEY INJURY) (HCC): ICD-10-CM

## 2024-01-01 DIAGNOSIS — I35.9 NONRHEUMATIC AORTIC VALVE DISORDER: ICD-10-CM

## 2024-01-01 DIAGNOSIS — E46 PROTEIN-CALORIE MALNUTRITION, UNSPECIFIED SEVERITY (HCC): ICD-10-CM

## 2024-01-01 DIAGNOSIS — R53.81 PHYSICAL DECONDITIONING: ICD-10-CM

## 2024-01-01 DIAGNOSIS — T68.XXXA HYPOTHERMIA, INITIAL ENCOUNTER: ICD-10-CM

## 2024-01-01 DIAGNOSIS — Z95.2 S/P AVR: Primary | ICD-10-CM

## 2024-01-01 LAB
ABO GROUP BLD BPU: NORMAL
ABO GROUP BLD: NORMAL
ALBUMIN SERPL BCP-MCNC: 3.7 G/DL (ref 3.5–5)
ALBUMIN SERPL BCP-MCNC: 4.1 G/DL (ref 3.5–5)
ALP SERPL-CCNC: 87 U/L (ref 34–104)
ALP SERPL-CCNC: 95 U/L (ref 34–104)
ALT SERPL W P-5'-P-CCNC: 12 U/L (ref 7–52)
ALT SERPL W P-5'-P-CCNC: 9 U/L (ref 7–52)
ANION GAP SERPL CALCULATED.3IONS-SCNC: 10 MMOL/L
ANION GAP SERPL CALCULATED.3IONS-SCNC: 11 MMOL/L
ANION GAP SERPL CALCULATED.3IONS-SCNC: 12 MMOL/L
ANION GAP SERPL CALCULATED.3IONS-SCNC: 12 MMOL/L
ANION GAP SERPL CALCULATED.3IONS-SCNC: 13 MMOL/L
ANION GAP SERPL CALCULATED.3IONS-SCNC: 14 MMOL/L
ANION GAP SERPL CALCULATED.3IONS-SCNC: 15 MMOL/L
ANION GAP SERPL CALCULATED.3IONS-SCNC: 16 MMOL/L
ANION GAP SERPL CALCULATED.3IONS-SCNC: 17 MMOL/L
ANION GAP SERPL CALCULATED.3IONS-SCNC: 17 MMOL/L
ANION GAP SERPL CALCULATED.3IONS-SCNC: 22 MMOL/L
ANION GAP SERPL CALCULATED.3IONS-SCNC: 6 MMOL/L
ANION GAP SERPL CALCULATED.3IONS-SCNC: 7 MMOL/L
ANION GAP SERPL CALCULATED.3IONS-SCNC: 8 MMOL/L
ANION GAP SERPL CALCULATED.3IONS-SCNC: 9 MMOL/L
ANTIBODY ELUTION #1: NORMAL
APPEARANCE FLD: ABNORMAL
APTT PPP: 30 SECONDS (ref 23–37)
APTT PPP: 34 SECONDS (ref 23–37)
APTT PPP: 43 SECONDS (ref 23–37)
APTT PPP: 43 SECONDS (ref 23–37)
ARTERIAL PATENCY WRIST A: NO
AST SERPL W P-5'-P-CCNC: 32 U/L (ref 13–39)
AST SERPL W P-5'-P-CCNC: 58 U/L (ref 13–39)
ATRIAL RATE: 166 BPM
ATRIAL RATE: 61 BPM
ATRIAL RATE: 81 BPM
ATRIAL RATE: 96 BPM
B2 GLYCOPROT1 AB SER QL: NEGATIVE
BACTERIA SPEC BFLD CULT: NO GROWTH
BACTERIA UR QL AUTO: ABNORMAL /HPF
BASE EX.OXY STD BLDV CALC-SCNC: 40.5 % (ref 60–80)
BASE EX.OXY STD BLDV CALC-SCNC: 66.4 % (ref 60–80)
BASE EX.OXY STD BLDV CALC-SCNC: 71.5 % (ref 60–80)
BASE EXCESS BLDA CALC-SCNC: -5.3 MMOL/L
BASE EXCESS BLDA CALC-SCNC: 0.5 MMOL/L
BASE EXCESS BLDA CALC-SCNC: 0.9 MMOL/L
BASE EXCESS BLDA CALC-SCNC: 1 MMOL/L (ref -2–3)
BASE EXCESS BLDA CALC-SCNC: 1.7 MMOL/L
BASE EXCESS BLDA CALC-SCNC: 2 MMOL/L (ref -2–3)
BASE EXCESS BLDA CALC-SCNC: 3 MMOL/L (ref -2–3)
BASE EXCESS BLDA CALC-SCNC: 4 MMOL/L (ref -2–3)
BASE EXCESS BLDA CALC-SCNC: 4.3 MMOL/L
BASE EXCESS BLDA CALC-SCNC: 5 MMOL/L (ref -2–3)
BASE EXCESS BLDA CALC-SCNC: 6 MMOL/L (ref -2–3)
BASE EXCESS BLDA CALC-SCNC: 6 MMOL/L (ref -2–3)
BASE EXCESS BLDA CALC-SCNC: 7 MMOL/L (ref -2–3)
BASE EXCESS BLDV CALC-SCNC: 1.5 MMOL/L
BASE EXCESS BLDV CALC-SCNC: 2.2 MMOL/L
BASE EXCESS BLDV CALC-SCNC: 5.4 MMOL/L
BASOPHILS # BLD AUTO: 0.02 THOUSANDS/ÂΜL (ref 0–0.1)
BASOPHILS # BLD AUTO: 0.04 THOUSANDS/ÂΜL (ref 0–0.1)
BASOPHILS # BLD AUTO: 0.04 THOUSANDS/ÂΜL (ref 0–0.1)
BASOPHILS # BLD AUTO: 0.05 THOUSANDS/ÂΜL (ref 0–0.1)
BASOPHILS # BLD AUTO: 0.06 THOUSANDS/ÂΜL (ref 0–0.1)
BASOPHILS NFR BLD AUTO: 0 % (ref 0–1)
BASOPHILS NFR FLD MANUAL: 1 %
BILIRUB DIRECT SERPL-MCNC: 1.17 MG/DL (ref 0–0.2)
BILIRUB DIRECT SERPL-MCNC: 1.69 MG/DL (ref 0–0.2)
BILIRUB SERPL-MCNC: 2.72 MG/DL (ref 0.2–1)
BILIRUB SERPL-MCNC: 2.82 MG/DL (ref 0.2–1)
BILIRUB UR QL STRIP: NEGATIVE
BLD GP AB SCN SERPL QL: POSITIVE
BLOOD GROUP ANTIBODIES SERPL: NORMAL
BNP SERPL-MCNC: 617 PG/ML (ref 0–100)
BODY TEMPERATURE: 97.7 DEGREES FEHRENHEIT
BODY TEMPERATURE: 98.3 DEGREES FEHRENHEIT
BODY TEMPERATURE: 98.7 DEGREES FEHRENHEIT
BPU ID: NORMAL
BUN SERPL-MCNC: 105 MG/DL (ref 5–25)
BUN SERPL-MCNC: 113 MG/DL (ref 5–25)
BUN SERPL-MCNC: 114 MG/DL (ref 5–25)
BUN SERPL-MCNC: 114 MG/DL (ref 5–25)
BUN SERPL-MCNC: 121 MG/DL (ref 5–25)
BUN SERPL-MCNC: 122 MG/DL (ref 5–25)
BUN SERPL-MCNC: 126 MG/DL (ref 5–25)
BUN SERPL-MCNC: 129 MG/DL (ref 5–25)
BUN SERPL-MCNC: 133 MG/DL (ref 5–25)
BUN SERPL-MCNC: 134 MG/DL (ref 5–25)
BUN SERPL-MCNC: 135 MG/DL (ref 5–25)
BUN SERPL-MCNC: 136 MG/DL (ref 5–25)
BUN SERPL-MCNC: 136 MG/DL (ref 5–25)
BUN SERPL-MCNC: 137 MG/DL (ref 5–25)
BUN SERPL-MCNC: 138 MG/DL (ref 5–25)
BUN SERPL-MCNC: 143 MG/DL (ref 5–25)
BUN SERPL-MCNC: 144 MG/DL (ref 5–25)
BUN SERPL-MCNC: 144 MG/DL (ref 5–25)
BUN SERPL-MCNC: 157 MG/DL (ref 5–25)
BUN SERPL-MCNC: 162 MG/DL (ref 5–25)
BUN SERPL-MCNC: 30 MG/DL (ref 5–25)
BUN SERPL-MCNC: 30 MG/DL (ref 5–25)
BUN SERPL-MCNC: 31 MG/DL (ref 5–25)
BUN SERPL-MCNC: 33 MG/DL (ref 5–25)
BUN SERPL-MCNC: 34 MG/DL (ref 5–25)
BUN SERPL-MCNC: 38 MG/DL (ref 5–25)
BUN SERPL-MCNC: 42 MG/DL (ref 5–25)
BUN SERPL-MCNC: 44 MG/DL (ref 5–25)
BUN SERPL-MCNC: 47 MG/DL (ref 5–25)
BUN SERPL-MCNC: 50 MG/DL (ref 5–25)
BUN SERPL-MCNC: 50 MG/DL (ref 5–25)
BUN SERPL-MCNC: 56 MG/DL (ref 5–25)
BUN SERPL-MCNC: 57 MG/DL (ref 5–25)
BUN SERPL-MCNC: 61 MG/DL (ref 5–25)
BUN SERPL-MCNC: 64 MG/DL (ref 5–25)
BUN SERPL-MCNC: 65 MG/DL (ref 5–25)
BUN SERPL-MCNC: 70 MG/DL (ref 5–25)
BUN SERPL-MCNC: 73 MG/DL (ref 5–25)
BUN SERPL-MCNC: 73 MG/DL (ref 5–25)
BUN SERPL-MCNC: 76 MG/DL (ref 5–25)
BUN SERPL-MCNC: 79 MG/DL (ref 5–25)
BUN SERPL-MCNC: 80 MG/DL (ref 5–25)
BUN SERPL-MCNC: 82 MG/DL (ref 5–25)
BUN SERPL-MCNC: 85 MG/DL (ref 5–25)
BUN SERPL-MCNC: 85 MG/DL (ref 5–25)
BUN SERPL-MCNC: 86 MG/DL (ref 5–25)
BUN SERPL-MCNC: 96 MG/DL (ref 5–25)
BUN SERPL-MCNC: 98 MG/DL (ref 5–25)
CA-I BLD-SCNC: 1.06 MMOL/L (ref 1.12–1.32)
CA-I BLD-SCNC: 1.1 MMOL/L (ref 1.12–1.32)
CA-I BLD-SCNC: 1.11 MMOL/L (ref 1.12–1.32)
CA-I BLD-SCNC: 1.12 MMOL/L (ref 1.12–1.32)
CA-I BLD-SCNC: 1.12 MMOL/L (ref 1.12–1.32)
CA-I BLD-SCNC: 1.13 MMOL/L (ref 1.12–1.32)
CA-I BLD-SCNC: 1.13 MMOL/L (ref 1.12–1.32)
CA-I BLD-SCNC: 1.15 MMOL/L (ref 1.12–1.32)
CA-I BLD-SCNC: 1.17 MMOL/L (ref 1.12–1.32)
CA-I BLD-SCNC: 1.18 MMOL/L (ref 1.12–1.32)
CA-I BLD-SCNC: 1.2 MMOL/L (ref 1.12–1.32)
CA-I BLD-SCNC: 1.24 MMOL/L (ref 1.12–1.32)
CA-I BLD-SCNC: 1.24 MMOL/L (ref 1.12–1.32)
CALCIUM SERPL-MCNC: 7.9 MG/DL (ref 8.4–10.2)
CALCIUM SERPL-MCNC: 7.9 MG/DL (ref 8.4–10.2)
CALCIUM SERPL-MCNC: 8 MG/DL (ref 8.4–10.2)
CALCIUM SERPL-MCNC: 8.1 MG/DL (ref 8.4–10.2)
CALCIUM SERPL-MCNC: 8.1 MG/DL (ref 8.4–10.2)
CALCIUM SERPL-MCNC: 8.2 MG/DL (ref 8.4–10.2)
CALCIUM SERPL-MCNC: 8.3 MG/DL (ref 8.4–10.2)
CALCIUM SERPL-MCNC: 8.4 MG/DL (ref 8.4–10.2)
CALCIUM SERPL-MCNC: 8.5 MG/DL (ref 8.4–10.2)
CALCIUM SERPL-MCNC: 8.6 MG/DL (ref 8.4–10.2)
CALCIUM SERPL-MCNC: 8.7 MG/DL (ref 8.4–10.2)
CALCIUM SERPL-MCNC: 8.8 MG/DL (ref 8.4–10.2)
CALCIUM SERPL-MCNC: 8.9 MG/DL (ref 8.4–10.2)
CALCIUM SERPL-MCNC: 9 MG/DL (ref 8.4–10.2)
CALCIUM SERPL-MCNC: 9.1 MG/DL (ref 8.4–10.2)
CALCIUM SERPL-MCNC: 9.1 MG/DL (ref 8.4–10.2)
CALCIUM SERPL-MCNC: 9.4 MG/DL (ref 8.4–10.2)
CALCIUM SERPL-MCNC: 9.5 MG/DL (ref 8.4–10.2)
CFFMA (FUNCTIONAL FIBRINOGEN MAX AMPLITUDE): 20 MM (ref 15–32)
CHLORIDE SERPL-SCNC: 101 MMOL/L (ref 96–108)
CHLORIDE SERPL-SCNC: 102 MMOL/L (ref 96–108)
CHLORIDE SERPL-SCNC: 105 MMOL/L (ref 96–108)
CHLORIDE SERPL-SCNC: 106 MMOL/L (ref 96–108)
CHLORIDE SERPL-SCNC: 107 MMOL/L (ref 96–108)
CHLORIDE SERPL-SCNC: 107 MMOL/L (ref 96–108)
CHLORIDE SERPL-SCNC: 108 MMOL/L (ref 96–108)
CHLORIDE SERPL-SCNC: 108 MMOL/L (ref 96–108)
CHLORIDE SERPL-SCNC: 109 MMOL/L (ref 96–108)
CHLORIDE SERPL-SCNC: 109 MMOL/L (ref 96–108)
CHLORIDE SERPL-SCNC: 110 MMOL/L (ref 96–108)
CHLORIDE SERPL-SCNC: 110 MMOL/L (ref 96–108)
CHLORIDE SERPL-SCNC: 112 MMOL/L (ref 96–108)
CHLORIDE SERPL-SCNC: 112 MMOL/L (ref 96–108)
CHLORIDE SERPL-SCNC: 74 MMOL/L (ref 96–108)
CHLORIDE SERPL-SCNC: 76 MMOL/L (ref 96–108)
CHLORIDE SERPL-SCNC: 77 MMOL/L (ref 96–108)
CHLORIDE SERPL-SCNC: 78 MMOL/L (ref 96–108)
CHLORIDE SERPL-SCNC: 79 MMOL/L (ref 96–108)
CHLORIDE SERPL-SCNC: 80 MMOL/L (ref 96–108)
CHLORIDE SERPL-SCNC: 80 MMOL/L (ref 96–108)
CHLORIDE SERPL-SCNC: 81 MMOL/L (ref 96–108)
CHLORIDE SERPL-SCNC: 81 MMOL/L (ref 96–108)
CHLORIDE SERPL-SCNC: 82 MMOL/L (ref 96–108)
CHLORIDE SERPL-SCNC: 85 MMOL/L (ref 96–108)
CHLORIDE SERPL-SCNC: 85 MMOL/L (ref 96–108)
CHLORIDE SERPL-SCNC: 86 MMOL/L (ref 96–108)
CHLORIDE SERPL-SCNC: 86 MMOL/L (ref 96–108)
CHLORIDE SERPL-SCNC: 87 MMOL/L (ref 96–108)
CHLORIDE SERPL-SCNC: 89 MMOL/L (ref 96–108)
CHLORIDE SERPL-SCNC: 90 MMOL/L (ref 96–108)
CHLORIDE SERPL-SCNC: 91 MMOL/L (ref 96–108)
CHLORIDE SERPL-SCNC: 92 MMOL/L (ref 96–108)
CHLORIDE SERPL-SCNC: 93 MMOL/L (ref 96–108)
CHLORIDE SERPL-SCNC: 94 MMOL/L (ref 96–108)
CHLORIDE SERPL-SCNC: 94 MMOL/L (ref 96–108)
CHLORIDE SERPL-SCNC: 96 MMOL/L (ref 96–108)
CHLORIDE SERPL-SCNC: 97 MMOL/L (ref 96–108)
CHLORIDE SERPL-SCNC: 99 MMOL/L (ref 96–108)
CKLY30: 0 % (ref 0–2.6)
CKR(REACTION TIME): 8.1 MIN (ref 4.6–9.1)
CLARITY UR: ABNORMAL
CO2 SERPL-SCNC: 24 MMOL/L (ref 21–32)
CO2 SERPL-SCNC: 25 MMOL/L (ref 21–32)
CO2 SERPL-SCNC: 26 MMOL/L (ref 21–32)
CO2 SERPL-SCNC: 27 MMOL/L (ref 21–32)
CO2 SERPL-SCNC: 27 MMOL/L (ref 21–32)
CO2 SERPL-SCNC: 28 MMOL/L (ref 21–32)
CO2 SERPL-SCNC: 29 MMOL/L (ref 21–32)
CO2 SERPL-SCNC: 30 MMOL/L (ref 21–32)
CO2 SERPL-SCNC: 31 MMOL/L (ref 21–32)
CO2 SERPL-SCNC: 32 MMOL/L (ref 21–32)
CO2 SERPL-SCNC: 33 MMOL/L (ref 21–32)
CO2 SERPL-SCNC: 34 MMOL/L (ref 21–32)
CO2 SERPL-SCNC: 35 MMOL/L (ref 21–32)
CO2 SERPL-SCNC: 36 MMOL/L (ref 21–32)
COLOR FLD: ABNORMAL
COLOR UR: ABNORMAL
CREAT SERPL-MCNC: 1.15 MG/DL (ref 0.6–1.3)
CREAT SERPL-MCNC: 1.16 MG/DL (ref 0.6–1.3)
CREAT SERPL-MCNC: 1.38 MG/DL (ref 0.6–1.3)
CREAT SERPL-MCNC: 1.46 MG/DL (ref 0.6–1.3)
CREAT SERPL-MCNC: 1.86 MG/DL (ref 0.6–1.3)
CREAT SERPL-MCNC: 1.87 MG/DL (ref 0.6–1.3)
CREAT SERPL-MCNC: 2.01 MG/DL (ref 0.6–1.3)
CREAT SERPL-MCNC: 2.09 MG/DL (ref 0.6–1.3)
CREAT SERPL-MCNC: 2.14 MG/DL (ref 0.6–1.3)
CREAT SERPL-MCNC: 2.17 MG/DL (ref 0.6–1.3)
CREAT SERPL-MCNC: 2.19 MG/DL (ref 0.6–1.3)
CREAT SERPL-MCNC: 2.2 MG/DL (ref 0.6–1.3)
CREAT SERPL-MCNC: 2.21 MG/DL (ref 0.6–1.3)
CREAT SERPL-MCNC: 2.26 MG/DL (ref 0.6–1.3)
CREAT SERPL-MCNC: 2.27 MG/DL (ref 0.6–1.3)
CREAT SERPL-MCNC: 2.28 MG/DL (ref 0.6–1.3)
CREAT SERPL-MCNC: 2.29 MG/DL (ref 0.6–1.3)
CREAT SERPL-MCNC: 2.33 MG/DL (ref 0.6–1.3)
CREAT SERPL-MCNC: 2.36 MG/DL (ref 0.6–1.3)
CREAT SERPL-MCNC: 2.41 MG/DL (ref 0.6–1.3)
CREAT SERPL-MCNC: 2.41 MG/DL (ref 0.6–1.3)
CREAT SERPL-MCNC: 2.44 MG/DL (ref 0.6–1.3)
CREAT SERPL-MCNC: 2.46 MG/DL (ref 0.6–1.3)
CREAT SERPL-MCNC: 2.47 MG/DL (ref 0.6–1.3)
CREAT SERPL-MCNC: 2.48 MG/DL (ref 0.6–1.3)
CREAT SERPL-MCNC: 2.5 MG/DL (ref 0.6–1.3)
CREAT SERPL-MCNC: 2.61 MG/DL (ref 0.6–1.3)
CREAT SERPL-MCNC: 2.68 MG/DL (ref 0.6–1.3)
CREAT SERPL-MCNC: 2.7 MG/DL (ref 0.6–1.3)
CREAT SERPL-MCNC: 2.72 MG/DL (ref 0.6–1.3)
CREAT SERPL-MCNC: 2.75 MG/DL (ref 0.6–1.3)
CREAT SERPL-MCNC: 2.84 MG/DL (ref 0.6–1.3)
CREAT SERPL-MCNC: 2.86 MG/DL (ref 0.6–1.3)
CREAT SERPL-MCNC: 2.88 MG/DL (ref 0.6–1.3)
CREAT SERPL-MCNC: 2.9 MG/DL (ref 0.6–1.3)
CREAT SERPL-MCNC: 2.94 MG/DL (ref 0.6–1.3)
CREAT SERPL-MCNC: 2.95 MG/DL (ref 0.6–1.3)
CREAT SERPL-MCNC: 3.01 MG/DL (ref 0.6–1.3)
CREAT SERPL-MCNC: 3.03 MG/DL (ref 0.6–1.3)
CREAT SERPL-MCNC: 3.06 MG/DL (ref 0.6–1.3)
CREAT SERPL-MCNC: 3.07 MG/DL (ref 0.6–1.3)
CREAT SERPL-MCNC: 3.1 MG/DL (ref 0.6–1.3)
CREAT SERPL-MCNC: 3.11 MG/DL (ref 0.6–1.3)
CREAT SERPL-MCNC: 3.24 MG/DL (ref 0.6–1.3)
CREAT SERPL-MCNC: 3.24 MG/DL (ref 0.6–1.3)
CREAT SERPL-MCNC: 3.27 MG/DL (ref 0.6–1.3)
CREAT SERPL-MCNC: 3.37 MG/DL (ref 0.6–1.3)
CREAT SERPL-MCNC: 3.55 MG/DL (ref 0.6–1.3)
CREAT UR-MCNC: 39.6 MG/DL
CROSSMATCH: NORMAL
CRTMA(RAPIDTEG MAX AMPLITUDE): 62.4 MM (ref 52–70)
DAT POLY-SP REAG RBC QL: NEGATIVE
EOSINOPHIL # BLD AUTO: 0 THOUSAND/ÂΜL (ref 0–0.61)
EOSINOPHIL # BLD AUTO: 0.01 THOUSAND/ÂΜL (ref 0–0.61)
EOSINOPHIL # BLD AUTO: 0.02 THOUSAND/ÂΜL (ref 0–0.61)
EOSINOPHIL # BLD AUTO: 0.02 THOUSAND/ÂΜL (ref 0–0.61)
EOSINOPHIL # BLD AUTO: 0.03 THOUSAND/ÂΜL (ref 0–0.61)
EOSINOPHIL NFR BLD AUTO: 0 % (ref 0–6)
ERYTHROCYTE [DISTWIDTH] IN BLOOD BY AUTOMATED COUNT: 14.9 % (ref 11.6–15.1)
ERYTHROCYTE [DISTWIDTH] IN BLOOD BY AUTOMATED COUNT: 15 % (ref 11.6–15.1)
ERYTHROCYTE [DISTWIDTH] IN BLOOD BY AUTOMATED COUNT: 15.1 % (ref 11.6–15.1)
ERYTHROCYTE [DISTWIDTH] IN BLOOD BY AUTOMATED COUNT: 15.3 % (ref 11.6–15.1)
ERYTHROCYTE [DISTWIDTH] IN BLOOD BY AUTOMATED COUNT: 15.5 % (ref 11.6–15.1)
ERYTHROCYTE [DISTWIDTH] IN BLOOD BY AUTOMATED COUNT: 15.5 % (ref 11.6–15.1)
ERYTHROCYTE [DISTWIDTH] IN BLOOD BY AUTOMATED COUNT: 15.6 % (ref 11.6–15.1)
ERYTHROCYTE [DISTWIDTH] IN BLOOD BY AUTOMATED COUNT: 15.7 % (ref 11.6–15.1)
ERYTHROCYTE [DISTWIDTH] IN BLOOD BY AUTOMATED COUNT: 15.7 % (ref 11.6–15.1)
ERYTHROCYTE [DISTWIDTH] IN BLOOD BY AUTOMATED COUNT: 15.8 % (ref 11.6–15.1)
ERYTHROCYTE [DISTWIDTH] IN BLOOD BY AUTOMATED COUNT: 15.9 % (ref 11.6–15.1)
ERYTHROCYTE [DISTWIDTH] IN BLOOD BY AUTOMATED COUNT: 16.3 % (ref 11.6–15.1)
ERYTHROCYTE [DISTWIDTH] IN BLOOD BY AUTOMATED COUNT: 16.9 % (ref 11.6–15.1)
FIBRINOGEN PPP-MCNC: 147 MG/DL (ref 207–520)
FIBRINOGEN PPP-MCNC: 150 MG/DL (ref 207–520)
FIBRINOGEN PPP-MCNC: 248 MG/DL (ref 207–520)
GFR SERPL CREATININE-BSD FRML MDRD: 11 ML/MIN/1.73SQ M
GFR SERPL CREATININE-BSD FRML MDRD: 12 ML/MIN/1.73SQ M
GFR SERPL CREATININE-BSD FRML MDRD: 13 ML/MIN/1.73SQ M
GFR SERPL CREATININE-BSD FRML MDRD: 14 ML/MIN/1.73SQ M
GFR SERPL CREATININE-BSD FRML MDRD: 15 ML/MIN/1.73SQ M
GFR SERPL CREATININE-BSD FRML MDRD: 16 ML/MIN/1.73SQ M
GFR SERPL CREATININE-BSD FRML MDRD: 16 ML/MIN/1.73SQ M
GFR SERPL CREATININE-BSD FRML MDRD: 17 ML/MIN/1.73SQ M
GFR SERPL CREATININE-BSD FRML MDRD: 18 ML/MIN/1.73SQ M
GFR SERPL CREATININE-BSD FRML MDRD: 19 ML/MIN/1.73SQ M
GFR SERPL CREATININE-BSD FRML MDRD: 20 ML/MIN/1.73SQ M
GFR SERPL CREATININE-BSD FRML MDRD: 21 ML/MIN/1.73SQ M
GFR SERPL CREATININE-BSD FRML MDRD: 21 ML/MIN/1.73SQ M
GFR SERPL CREATININE-BSD FRML MDRD: 22 ML/MIN/1.73SQ M
GFR SERPL CREATININE-BSD FRML MDRD: 24 ML/MIN/1.73SQ M
GFR SERPL CREATININE-BSD FRML MDRD: 25 ML/MIN/1.73SQ M
GFR SERPL CREATININE-BSD FRML MDRD: 33 ML/MIN/1.73SQ M
GFR SERPL CREATININE-BSD FRML MDRD: 35 ML/MIN/1.73SQ M
GFR SERPL CREATININE-BSD FRML MDRD: 44 ML/MIN/1.73SQ M
GFR SERPL CREATININE-BSD FRML MDRD: 44 ML/MIN/1.73SQ M
GLUCOSE FLD-MCNC: 115 MG/DL
GLUCOSE SERPL-MCNC: 100 MG/DL (ref 65–140)
GLUCOSE SERPL-MCNC: 100 MG/DL (ref 65–140)
GLUCOSE SERPL-MCNC: 101 MG/DL (ref 65–140)
GLUCOSE SERPL-MCNC: 101 MG/DL (ref 65–140)
GLUCOSE SERPL-MCNC: 102 MG/DL (ref 65–140)
GLUCOSE SERPL-MCNC: 103 MG/DL (ref 65–140)
GLUCOSE SERPL-MCNC: 104 MG/DL (ref 65–140)
GLUCOSE SERPL-MCNC: 105 MG/DL (ref 65–140)
GLUCOSE SERPL-MCNC: 105 MG/DL (ref 65–140)
GLUCOSE SERPL-MCNC: 106 MG/DL (ref 65–140)
GLUCOSE SERPL-MCNC: 107 MG/DL (ref 65–140)
GLUCOSE SERPL-MCNC: 108 MG/DL (ref 65–140)
GLUCOSE SERPL-MCNC: 108 MG/DL (ref 65–140)
GLUCOSE SERPL-MCNC: 109 MG/DL (ref 65–140)
GLUCOSE SERPL-MCNC: 110 MG/DL (ref 65–140)
GLUCOSE SERPL-MCNC: 111 MG/DL (ref 65–140)
GLUCOSE SERPL-MCNC: 112 MG/DL (ref 65–140)
GLUCOSE SERPL-MCNC: 112 MG/DL (ref 65–140)
GLUCOSE SERPL-MCNC: 113 MG/DL (ref 65–140)
GLUCOSE SERPL-MCNC: 113 MG/DL (ref 65–140)
GLUCOSE SERPL-MCNC: 114 MG/DL (ref 65–140)
GLUCOSE SERPL-MCNC: 115 MG/DL (ref 65–140)
GLUCOSE SERPL-MCNC: 116 MG/DL (ref 65–140)
GLUCOSE SERPL-MCNC: 116 MG/DL (ref 65–140)
GLUCOSE SERPL-MCNC: 117 MG/DL (ref 65–140)
GLUCOSE SERPL-MCNC: 118 MG/DL (ref 65–140)
GLUCOSE SERPL-MCNC: 119 MG/DL (ref 65–140)
GLUCOSE SERPL-MCNC: 119 MG/DL (ref 65–140)
GLUCOSE SERPL-MCNC: 120 MG/DL (ref 65–140)
GLUCOSE SERPL-MCNC: 120 MG/DL (ref 65–140)
GLUCOSE SERPL-MCNC: 121 MG/DL (ref 65–140)
GLUCOSE SERPL-MCNC: 122 MG/DL (ref 65–140)
GLUCOSE SERPL-MCNC: 124 MG/DL (ref 65–140)
GLUCOSE SERPL-MCNC: 124 MG/DL (ref 65–140)
GLUCOSE SERPL-MCNC: 125 MG/DL (ref 65–140)
GLUCOSE SERPL-MCNC: 125 MG/DL (ref 65–140)
GLUCOSE SERPL-MCNC: 126 MG/DL (ref 65–140)
GLUCOSE SERPL-MCNC: 126 MG/DL (ref 65–140)
GLUCOSE SERPL-MCNC: 127 MG/DL (ref 65–140)
GLUCOSE SERPL-MCNC: 128 MG/DL (ref 65–140)
GLUCOSE SERPL-MCNC: 129 MG/DL (ref 65–140)
GLUCOSE SERPL-MCNC: 129 MG/DL (ref 65–140)
GLUCOSE SERPL-MCNC: 130 MG/DL (ref 65–140)
GLUCOSE SERPL-MCNC: 130 MG/DL (ref 65–140)
GLUCOSE SERPL-MCNC: 132 MG/DL (ref 65–140)
GLUCOSE SERPL-MCNC: 132 MG/DL (ref 65–140)
GLUCOSE SERPL-MCNC: 133 MG/DL (ref 65–140)
GLUCOSE SERPL-MCNC: 133 MG/DL (ref 65–140)
GLUCOSE SERPL-MCNC: 135 MG/DL (ref 65–140)
GLUCOSE SERPL-MCNC: 135 MG/DL (ref 65–140)
GLUCOSE SERPL-MCNC: 136 MG/DL (ref 65–140)
GLUCOSE SERPL-MCNC: 137 MG/DL (ref 65–140)
GLUCOSE SERPL-MCNC: 137 MG/DL (ref 65–140)
GLUCOSE SERPL-MCNC: 138 MG/DL (ref 65–140)
GLUCOSE SERPL-MCNC: 141 MG/DL (ref 65–140)
GLUCOSE SERPL-MCNC: 142 MG/DL (ref 65–140)
GLUCOSE SERPL-MCNC: 142 MG/DL (ref 65–140)
GLUCOSE SERPL-MCNC: 143 MG/DL (ref 65–140)
GLUCOSE SERPL-MCNC: 145 MG/DL (ref 65–140)
GLUCOSE SERPL-MCNC: 145 MG/DL (ref 65–140)
GLUCOSE SERPL-MCNC: 146 MG/DL (ref 65–140)
GLUCOSE SERPL-MCNC: 146 MG/DL (ref 65–140)
GLUCOSE SERPL-MCNC: 148 MG/DL (ref 65–140)
GLUCOSE SERPL-MCNC: 148 MG/DL (ref 65–140)
GLUCOSE SERPL-MCNC: 152 MG/DL (ref 65–140)
GLUCOSE SERPL-MCNC: 156 MG/DL (ref 65–140)
GLUCOSE SERPL-MCNC: 157 MG/DL (ref 65–140)
GLUCOSE SERPL-MCNC: 159 MG/DL (ref 65–140)
GLUCOSE SERPL-MCNC: 160 MG/DL (ref 65–140)
GLUCOSE SERPL-MCNC: 161 MG/DL (ref 65–140)
GLUCOSE SERPL-MCNC: 166 MG/DL (ref 65–140)
GLUCOSE SERPL-MCNC: 167 MG/DL (ref 65–140)
GLUCOSE SERPL-MCNC: 167 MG/DL (ref 65–140)
GLUCOSE SERPL-MCNC: 168 MG/DL (ref 65–140)
GLUCOSE SERPL-MCNC: 169 MG/DL (ref 65–140)
GLUCOSE SERPL-MCNC: 169 MG/DL (ref 65–140)
GLUCOSE SERPL-MCNC: 170 MG/DL (ref 65–140)
GLUCOSE SERPL-MCNC: 171 MG/DL (ref 65–140)
GLUCOSE SERPL-MCNC: 175 MG/DL (ref 65–140)
GLUCOSE SERPL-MCNC: 175 MG/DL (ref 65–140)
GLUCOSE SERPL-MCNC: 176 MG/DL (ref 65–140)
GLUCOSE SERPL-MCNC: 177 MG/DL (ref 65–140)
GLUCOSE SERPL-MCNC: 178 MG/DL (ref 65–140)
GLUCOSE SERPL-MCNC: 184 MG/DL (ref 65–140)
GLUCOSE SERPL-MCNC: 184 MG/DL (ref 65–140)
GLUCOSE SERPL-MCNC: 186 MG/DL (ref 65–140)
GLUCOSE SERPL-MCNC: 186 MG/DL (ref 65–140)
GLUCOSE SERPL-MCNC: 190 MG/DL (ref 65–140)
GLUCOSE SERPL-MCNC: 195 MG/DL (ref 65–140)
GLUCOSE SERPL-MCNC: 197 MG/DL (ref 65–140)
GLUCOSE SERPL-MCNC: 201 MG/DL (ref 65–140)
GLUCOSE SERPL-MCNC: 203 MG/DL (ref 65–140)
GLUCOSE SERPL-MCNC: 434 MG/DL (ref 65–140)
GLUCOSE SERPL-MCNC: 90 MG/DL (ref 65–140)
GLUCOSE SERPL-MCNC: 91 MG/DL (ref 65–140)
GLUCOSE SERPL-MCNC: 95 MG/DL (ref 65–140)
GLUCOSE SERPL-MCNC: 97 MG/DL (ref 65–140)
GLUCOSE SERPL-MCNC: 97 MG/DL (ref 65–140)
GLUCOSE UR STRIP-MCNC: NEGATIVE MG/DL
GRAM STN SPEC: NORMAL
GRAM STN SPEC: NORMAL
HCO3 BLDA-SCNC: 21.9 MMOL/L (ref 22–28)
HCO3 BLDA-SCNC: 25.6 MMOL/L (ref 22–28)
HCO3 BLDA-SCNC: 25.8 MMOL/L (ref 22–28)
HCO3 BLDA-SCNC: 26.1 MMOL/L (ref 22–28)
HCO3 BLDA-SCNC: 27.7 MMOL/L (ref 22–28)
HCO3 BLDA-SCNC: 28.1 MMOL/L (ref 24–30)
HCO3 BLDA-SCNC: 29.3 MMOL/L (ref 22–28)
HCO3 BLDA-SCNC: 29.5 MMOL/L (ref 22–28)
HCO3 BLDA-SCNC: 30.8 MMOL/L (ref 22–28)
HCO3 BLDA-SCNC: 30.8 MMOL/L (ref 22–28)
HCO3 BLDA-SCNC: 31.3 MMOL/L (ref 24–30)
HCO3 BLDA-SCNC: 32.4 MMOL/L (ref 24–30)
HCO3 BLDA-SCNC: 35.6 MMOL/L (ref 24–30)
HCO3 BLDV-SCNC: 27.9 MMOL/L (ref 24–30)
HCO3 BLDV-SCNC: 29.1 MMOL/L (ref 24–30)
HCO3 BLDV-SCNC: 33.6 MMOL/L (ref 24–30)
HCT VFR BLD AUTO: 23.1 % (ref 34.8–46.1)
HCT VFR BLD AUTO: 24.1 % (ref 34.8–46.1)
HCT VFR BLD AUTO: 24.2 % (ref 34.8–46.1)
HCT VFR BLD AUTO: 24.8 % (ref 34.8–46.1)
HCT VFR BLD AUTO: 26 % (ref 34.8–46.1)
HCT VFR BLD AUTO: 26.4 % (ref 34.8–46.1)
HCT VFR BLD AUTO: 26.7 % (ref 34.8–46.1)
HCT VFR BLD AUTO: 27 % (ref 34.8–46.1)
HCT VFR BLD AUTO: 27.2 % (ref 34.8–46.1)
HCT VFR BLD AUTO: 27.4 % (ref 34.8–46.1)
HCT VFR BLD AUTO: 27.6 % (ref 34.8–46.1)
HCT VFR BLD AUTO: 28.4 % (ref 34.8–46.1)
HCT VFR BLD AUTO: 28.5 % (ref 34.8–46.1)
HCT VFR BLD AUTO: 28.7 % (ref 34.8–46.1)
HCT VFR BLD AUTO: 28.9 % (ref 34.8–46.1)
HCT VFR BLD AUTO: 29 % (ref 34.8–46.1)
HCT VFR BLD AUTO: 29.1 % (ref 34.8–46.1)
HCT VFR BLD AUTO: 30 % (ref 34.8–46.1)
HCT VFR BLD AUTO: 30.1 % (ref 34.8–46.1)
HCT VFR BLD AUTO: 38.2 % (ref 34.8–46.1)
HCT VFR BLD CALC: 26 % (ref 34.8–46.1)
HCT VFR BLD CALC: 27 % (ref 34.8–46.1)
HCT VFR BLD CALC: 28 % (ref 34.8–46.1)
HCT VFR BLD CALC: 29 % (ref 34.8–46.1)
HCT VFR BLD CALC: 32 % (ref 34.8–46.1)
HCT VFR BLD CALC: 34 % (ref 34.8–46.1)
HEMOCCULT STL QL: ABNORMAL
HEMOCCULT STL QL: ABNORMAL
HEMOCCULT STL QL: POSITIVE
HGB BLD-MCNC: 11.7 G/DL (ref 11.5–15.4)
HGB BLD-MCNC: 7.2 G/DL (ref 11.5–15.4)
HGB BLD-MCNC: 7.3 G/DL (ref 11.5–15.4)
HGB BLD-MCNC: 7.5 G/DL (ref 11.5–15.4)
HGB BLD-MCNC: 7.6 G/DL (ref 11.5–15.4)
HGB BLD-MCNC: 7.9 G/DL (ref 11.5–15.4)
HGB BLD-MCNC: 8 G/DL (ref 11.5–15.4)
HGB BLD-MCNC: 8.1 G/DL (ref 11.5–15.4)
HGB BLD-MCNC: 8.4 G/DL (ref 11.5–15.4)
HGB BLD-MCNC: 8.5 G/DL (ref 11.5–15.4)
HGB BLD-MCNC: 8.5 G/DL (ref 11.5–15.4)
HGB BLD-MCNC: 8.6 G/DL (ref 11.5–15.4)
HGB BLD-MCNC: 8.7 G/DL (ref 11.5–15.4)
HGB BLD-MCNC: 8.8 G/DL (ref 11.5–15.4)
HGB BLD-MCNC: 8.8 G/DL (ref 11.5–15.4)
HGB BLD-MCNC: 8.9 G/DL (ref 11.5–15.4)
HGB BLD-MCNC: 8.9 G/DL (ref 11.5–15.4)
HGB BLD-MCNC: 9.1 G/DL (ref 11.5–15.4)
HGB BLD-MCNC: 9.1 G/DL (ref 11.5–15.4)
HGB BLD-MCNC: 9.3 G/DL (ref 11.5–15.4)
HGB BLD-MCNC: 9.5 G/DL (ref 11.5–15.4)
HGB BLDA-MCNC: 10.9 G/DL (ref 11.5–15.4)
HGB BLDA-MCNC: 11.6 G/DL (ref 11.5–15.4)
HGB BLDA-MCNC: 8.8 G/DL (ref 11.5–15.4)
HGB BLDA-MCNC: 9.2 G/DL (ref 11.5–15.4)
HGB BLDA-MCNC: 9.5 G/DL (ref 11.5–15.4)
HGB BLDA-MCNC: 9.9 G/DL (ref 11.5–15.4)
HGB UR QL STRIP.AUTO: ABNORMAL
HOROWITZ INDEX BLDA+IHG-RTO: 50 MM[HG]
HYALINE CASTS #/AREA URNS LPF: ABNORMAL /LPF
IMM GRANULOCYTES # BLD AUTO: 0.06 THOUSAND/UL (ref 0–0.2)
IMM GRANULOCYTES # BLD AUTO: 0.09 THOUSAND/UL (ref 0–0.2)
IMM GRANULOCYTES # BLD AUTO: 0.11 THOUSAND/UL (ref 0–0.2)
IMM GRANULOCYTES # BLD AUTO: 0.12 THOUSAND/UL (ref 0–0.2)
IMM GRANULOCYTES # BLD AUTO: 0.13 THOUSAND/UL (ref 0–0.2)
IMM GRANULOCYTES NFR BLD AUTO: 1 % (ref 0–2)
INR PPP: 0.96 (ref 0.84–1.19)
INR PPP: 1.5 (ref 0.84–1.19)
INR PPP: 1.61 (ref 0.84–1.19)
INR PPP: 1.71 (ref 0.84–1.19)
INR PPP: 1.72 (ref 0.84–1.19)
INR PPP: 1.83 (ref 0.84–1.19)
INR PPP: 1.86 (ref 0.84–1.19)
INR PPP: 1.88 (ref 0.84–1.19)
INR PPP: 1.93 (ref 0.84–1.19)
INR PPP: 1.97 (ref 0.84–1.19)
INR PPP: 2 (ref 0.84–1.19)
INR PPP: 2.04 (ref 0.84–1.19)
INR PPP: 2.13 (ref 0.84–1.19)
INR PPP: 2.24 (ref 0.84–1.19)
INR PPP: 2.35 (ref 0.84–1.19)
INR PPP: 2.35 (ref 0.84–1.19)
INR PPP: 2.46 (ref 0.84–1.19)
INR PPP: 2.67 (ref 0.84–1.19)
INR PPP: 2.91 (ref 0.84–1.19)
INR PPP: 2.96 (ref 0.84–1.19)
IPAP: 10
IPAP: 10
IPAP: 15
KCT BLD-ACNC: 143 SEC (ref 89–137)
KCT BLD-ACNC: 148 SEC (ref 89–137)
KCT BLD-ACNC: 165 SEC (ref 89–137)
KCT BLD-ACNC: 434 SEC (ref 89–137)
KCT BLD-ACNC: 467 SEC (ref 89–137)
KCT BLD-ACNC: 528 SEC (ref 89–137)
KCT BLD-ACNC: 712 SEC (ref 89–137)
KETONES UR STRIP-MCNC: NEGATIVE MG/DL
LDH FLD L TO P-CCNC: 570 U/L
LEUKOCYTE ESTERASE UR QL STRIP: ABNORMAL
LYMPHOCYTES # BLD AUTO: 0.92 THOUSANDS/ÂΜL (ref 0.6–4.47)
LYMPHOCYTES # BLD AUTO: 1.14 THOUSANDS/ÂΜL (ref 0.6–4.47)
LYMPHOCYTES # BLD AUTO: 1.15 THOUSANDS/ÂΜL (ref 0.6–4.47)
LYMPHOCYTES # BLD AUTO: 1.2 THOUSANDS/ÂΜL (ref 0.6–4.47)
LYMPHOCYTES # BLD AUTO: 1.29 THOUSANDS/ÂΜL (ref 0.6–4.47)
LYMPHOCYTES # BLD AUTO: 1.42 THOUSANDS/ÂΜL (ref 0.6–4.47)
LYMPHOCYTES # BLD AUTO: 2.27 THOUSANDS/ÂΜL (ref 0.6–4.47)
LYMPHOCYTES NFR BLD AUTO: 10 % (ref 14–44)
LYMPHOCYTES NFR BLD AUTO: 11 % (ref 14–44)
LYMPHOCYTES NFR BLD AUTO: 11 % (ref 14–44)
LYMPHOCYTES NFR BLD AUTO: 12 % (ref 14–44)
LYMPHOCYTES NFR BLD AUTO: 12 % (ref 14–44)
LYMPHOCYTES NFR BLD AUTO: 21 %
LYMPHOCYTES NFR BLD AUTO: 7 % (ref 14–44)
LYMPHOCYTES NFR BLD AUTO: 9 % (ref 14–44)
MAGNESIUM SERPL-MCNC: 2.3 MG/DL (ref 1.9–2.7)
MAGNESIUM SERPL-MCNC: 2.4 MG/DL (ref 1.9–2.7)
MAGNESIUM SERPL-MCNC: 2.4 MG/DL (ref 1.9–2.7)
MAGNESIUM SERPL-MCNC: 2.5 MG/DL (ref 1.9–2.7)
MAGNESIUM SERPL-MCNC: 2.6 MG/DL (ref 1.9–2.7)
MAGNESIUM SERPL-MCNC: 2.8 MG/DL (ref 1.9–2.7)
MAGNESIUM SERPL-MCNC: 2.8 MG/DL (ref 1.9–2.7)
MAGNESIUM SERPL-MCNC: 2.9 MG/DL (ref 1.9–2.7)
MAGNESIUM SERPL-MCNC: 3 MG/DL (ref 1.9–2.7)
MCH RBC QN AUTO: 28.3 PG (ref 26.8–34.3)
MCH RBC QN AUTO: 28.4 PG (ref 26.8–34.3)
MCH RBC QN AUTO: 28.6 PG (ref 26.8–34.3)
MCH RBC QN AUTO: 28.7 PG (ref 26.8–34.3)
MCH RBC QN AUTO: 28.7 PG (ref 26.8–34.3)
MCH RBC QN AUTO: 28.8 PG (ref 26.8–34.3)
MCH RBC QN AUTO: 28.9 PG (ref 26.8–34.3)
MCH RBC QN AUTO: 29 PG (ref 26.8–34.3)
MCH RBC QN AUTO: 29 PG (ref 26.8–34.3)
MCH RBC QN AUTO: 29.1 PG (ref 26.8–34.3)
MCH RBC QN AUTO: 29.4 PG (ref 26.8–34.3)
MCH RBC QN AUTO: 29.5 PG (ref 26.8–34.3)
MCH RBC QN AUTO: 29.7 PG (ref 26.8–34.3)
MCH RBC QN AUTO: 29.7 PG (ref 26.8–34.3)
MCH RBC QN AUTO: 29.8 PG (ref 26.8–34.3)
MCHC RBC AUTO-ENTMCNC: 29.3 G/DL (ref 31.4–37.4)
MCHC RBC AUTO-ENTMCNC: 29.6 G/DL (ref 31.4–37.4)
MCHC RBC AUTO-ENTMCNC: 29.9 G/DL (ref 31.4–37.4)
MCHC RBC AUTO-ENTMCNC: 29.9 G/DL (ref 31.4–37.4)
MCHC RBC AUTO-ENTMCNC: 30.3 G/DL (ref 31.4–37.4)
MCHC RBC AUTO-ENTMCNC: 30.3 G/DL (ref 31.4–37.4)
MCHC RBC AUTO-ENTMCNC: 30.6 G/DL (ref 31.4–37.4)
MCHC RBC AUTO-ENTMCNC: 30.6 G/DL (ref 31.4–37.4)
MCHC RBC AUTO-ENTMCNC: 30.8 G/DL (ref 31.4–37.4)
MCHC RBC AUTO-ENTMCNC: 31 G/DL (ref 31.4–37.4)
MCHC RBC AUTO-ENTMCNC: 31 G/DL (ref 31.4–37.4)
MCHC RBC AUTO-ENTMCNC: 31.3 G/DL (ref 31.4–37.4)
MCHC RBC AUTO-ENTMCNC: 31.5 G/DL (ref 31.4–37.4)
MCHC RBC AUTO-ENTMCNC: 31.6 G/DL (ref 31.4–37.4)
MCHC RBC AUTO-ENTMCNC: 31.6 G/DL (ref 31.4–37.4)
MCHC RBC AUTO-ENTMCNC: 31.9 G/DL (ref 31.4–37.4)
MCHC RBC AUTO-ENTMCNC: 32.1 G/DL (ref 31.4–37.4)
MCV RBC AUTO: 90 FL (ref 82–98)
MCV RBC AUTO: 92 FL (ref 82–98)
MCV RBC AUTO: 92 FL (ref 82–98)
MCV RBC AUTO: 93 FL (ref 82–98)
MCV RBC AUTO: 93 FL (ref 82–98)
MCV RBC AUTO: 94 FL (ref 82–98)
MCV RBC AUTO: 96 FL (ref 82–98)
MCV RBC AUTO: 96 FL (ref 82–98)
MCV RBC AUTO: 97 FL (ref 82–98)
MCV RBC AUTO: 98 FL (ref 82–98)
MONO+MESO NFR FLD MANUAL: 3 %
MONOCYTES # BLD AUTO: 0.81 THOUSAND/ÂΜL (ref 0.17–1.22)
MONOCYTES # BLD AUTO: 0.88 THOUSAND/ÂΜL (ref 0.17–1.22)
MONOCYTES # BLD AUTO: 0.88 THOUSAND/ÂΜL (ref 0.17–1.22)
MONOCYTES # BLD AUTO: 0.97 THOUSAND/ÂΜL (ref 0.17–1.22)
MONOCYTES # BLD AUTO: 1.14 THOUSAND/ÂΜL (ref 0.17–1.22)
MONOCYTES # BLD AUTO: 1.42 THOUSAND/ÂΜL (ref 0.17–1.22)
MONOCYTES # BLD AUTO: 2.58 THOUSAND/ÂΜL (ref 0.17–1.22)
MONOCYTES NFR BLD AUTO: 11 % (ref 4–12)
MONOCYTES NFR BLD AUTO: 12 % (ref 4–12)
MONOCYTES NFR BLD AUTO: 13 % (ref 4–12)
MONOCYTES NFR BLD AUTO: 18 %
MONOCYTES NFR BLD AUTO: 7 % (ref 4–12)
MONOCYTES NFR BLD AUTO: 7 % (ref 4–12)
MONOCYTES NFR BLD AUTO: 8 % (ref 4–12)
MONOCYTES NFR BLD AUTO: 8 % (ref 4–12)
NASAL CANNULA: 6
NEUTROPHILS # BLD AUTO: 10.7 THOUSANDS/ÂΜL (ref 1.85–7.62)
NEUTROPHILS # BLD AUTO: 10.99 THOUSANDS/ÂΜL (ref 1.85–7.62)
NEUTROPHILS # BLD AUTO: 14.9 THOUSANDS/ÂΜL (ref 1.85–7.62)
NEUTROPHILS # BLD AUTO: 7.39 THOUSANDS/ÂΜL (ref 1.85–7.62)
NEUTROPHILS # BLD AUTO: 8.62 THOUSANDS/ÂΜL (ref 1.85–7.62)
NEUTROPHILS # BLD AUTO: 8.88 THOUSANDS/ÂΜL (ref 1.85–7.62)
NEUTROPHILS # BLD AUTO: 9.87 THOUSANDS/ÂΜL (ref 1.85–7.62)
NEUTS SEG NFR BLD AUTO: 57 %
NEUTS SEG NFR BLD AUTO: 75 % (ref 43–75)
NEUTS SEG NFR BLD AUTO: 75 % (ref 43–75)
NEUTS SEG NFR BLD AUTO: 77 % (ref 43–75)
NEUTS SEG NFR BLD AUTO: 79 % (ref 43–75)
NEUTS SEG NFR BLD AUTO: 81 % (ref 43–75)
NEUTS SEG NFR BLD AUTO: 83 % (ref 43–75)
NEUTS SEG NFR BLD AUTO: 85 % (ref 43–75)
NITRITE UR QL STRIP: NEGATIVE
NON VENT- BIPAP: ABNORMAL
NON-SQ EPI CELLS URNS QL MICRO: ABNORMAL /HPF
NRBC BLD AUTO-RTO: 0 /100 WBCS
NRBC BLD AUTO-RTO: 0 /100 WBCS
NRBC BLD AUTO-RTO: 1 /100 WBCS
NRBC BLD AUTO-RTO: 2 /100 WBCS
O2 CT BLDA-SCNC: 11.7 ML/DL (ref 16–23)
O2 CT BLDA-SCNC: 12 ML/DL (ref 16–23)
O2 CT BLDA-SCNC: 12.2 ML/DL (ref 16–23)
O2 CT BLDA-SCNC: 12.4 ML/DL (ref 16–23)
O2 CT BLDA-SCNC: 12.8 ML/DL (ref 16–23)
O2 CT BLDV-SCNC: 5.4 ML/DL
O2 CT BLDV-SCNC: 8.3 ML/DL
O2 CT BLDV-SCNC: 9.5 ML/DL
OSMOLALITY UR/SERPL-RTO: 326 MMOL/KG (ref 282–298)
OXYHGB MFR BLDA: 90.8 % (ref 94–97)
OXYHGB MFR BLDA: 95.8 % (ref 94–97)
OXYHGB MFR BLDA: 96.1 % (ref 94–97)
OXYHGB MFR BLDA: 96.4 % (ref 94–97)
OXYHGB MFR BLDA: 97.3 % (ref 94–97)
P AXIS: 42 DEGREES
P AXIS: 70 DEGREES
P AXIS: 87 DEGREES
PCO2 BLD: 27 MMOL/L (ref 21–32)
PCO2 BLD: 30 MMOL/L (ref 21–32)
PCO2 BLD: 31 MMOL/L (ref 21–32)
PCO2 BLD: 32 MMOL/L (ref 21–32)
PCO2 BLD: 32 MMOL/L (ref 21–32)
PCO2 BLD: 33 MMOL/L (ref 21–32)
PCO2 BLD: 35 MMOL/L (ref 21–32)
PCO2 BLD: 38 MMOL/L (ref 21–32)
PCO2 BLD: 41.7 MM HG (ref 36–44)
PCO2 BLD: 46.5 MM HG (ref 36–44)
PCO2 BLD: 46.7 MM HG (ref 36–44)
PCO2 BLD: 50 MM HG (ref 42–50)
PCO2 BLD: 50.1 MM HG (ref 36–44)
PCO2 BLD: 52.2 MM HG (ref 42–50)
PCO2 BLD: 53.5 MM HG (ref 42–50)
PCO2 BLD: 56.6 MM HG (ref 42–50)
PCO2 BLD: 77.1 MM HG (ref 42–50)
PCO2 BLD: 77.2 MM HG (ref 42–50)
PCO2 BLDA: 41.2 MM HG (ref 36–44)
PCO2 BLDA: 46.7 MM HG (ref 36–44)
PCO2 BLDA: 47.1 MM HG (ref 36–44)
PCO2 BLDA: 50.9 MM HG (ref 36–44)
PCO2 BLDA: 51 MM HG (ref 36–44)
PCO2 BLDV: 53.3 MM HG (ref 42–50)
PCO2 BLDV: 57.9 MM HG (ref 42–50)
PCO2 BLDV: 73.5 MM HG (ref 42–50)
PCO2 TEMP ADJ BLDA: 40.8 MM HG (ref 36–44)
PCO2 TEMP ADJ BLDA: 45.7 MM HG (ref 36–44)
PCO2 TEMP ADJ BLDA: 46.1 MM HG (ref 36–44)
PEEP MAX SETTING VENT: 10 CM[H2O]
PEEP MAX SETTING VENT: 10 CM[H2O]
PEEP MAX SETTING VENT: 5 CM[H2O]
PEEP RESPIRATORY: 6 CM[H2O]
PH BLD: 7.23 [PH] (ref 7.3–7.4)
PH BLD: 7.27 [PH] (ref 7.3–7.4)
PH BLD: 7.33 [PH] (ref 7.3–7.4)
PH BLD: 7.33 [PH] (ref 7.3–7.4)
PH BLD: 7.36 [PH] (ref 7.3–7.4)
PH BLD: 7.37 [PH] (ref 7.35–7.45)
PH BLD: 7.39 [PH] (ref 7.3–7.4)
PH BLD: 7.4 [PH] (ref 7.35–7.45)
PH BLD: 7.4 [PH] (ref 7.35–7.45)
PH BLD: 7.41 [PH] (ref 7.35–7.45)
PH BLD: 7.41 [PH] (ref 7.35–7.45)
PH BLD: 7.42 [PH] (ref 7.35–7.45)
PH BLD: 7.43 [PH] (ref 7.35–7.45)
PH BLDA: 7.25 [PH] (ref 7.35–7.45)
PH BLDA: 7.35 [PH] (ref 7.35–7.45)
PH BLDA: 7.37 [PH] (ref 7.35–7.45)
PH BLDA: 7.41 [PH] (ref 7.35–7.45)
PH BLDA: 7.41 [PH] (ref 7.35–7.45)
PH BLDV: 7.28 [PH] (ref 7.3–7.4)
PH BLDV: 7.32 [PH] (ref 7.3–7.4)
PH BLDV: 7.34 [PH] (ref 7.3–7.4)
PH BODY FLUID: 7.5
PH UR STRIP.AUTO: 5 [PH]
PHOSPHATE SERPL-MCNC: 3 MG/DL (ref 2.3–4.1)
PHOSPHATE SERPL-MCNC: 3 MG/DL (ref 2.3–4.1)
PHOSPHATE SERPL-MCNC: 3.7 MG/DL (ref 2.3–4.1)
PLATELET # BLD AUTO: 101 THOUSANDS/UL (ref 149–390)
PLATELET # BLD AUTO: 111 THOUSANDS/UL (ref 149–390)
PLATELET # BLD AUTO: 130 THOUSANDS/UL (ref 149–390)
PLATELET # BLD AUTO: 131 THOUSANDS/UL (ref 149–390)
PLATELET # BLD AUTO: 134 THOUSANDS/UL (ref 149–390)
PLATELET # BLD AUTO: 138 THOUSANDS/UL (ref 149–390)
PLATELET # BLD AUTO: 142 THOUSANDS/UL (ref 149–390)
PLATELET # BLD AUTO: 148 THOUSANDS/UL (ref 149–390)
PLATELET # BLD AUTO: 150 THOUSANDS/UL (ref 149–390)
PLATELET # BLD AUTO: 153 THOUSANDS/UL (ref 149–390)
PLATELET # BLD AUTO: 169 THOUSANDS/UL (ref 149–390)
PLATELET # BLD AUTO: 220 THOUSANDS/UL (ref 149–390)
PLATELET # BLD AUTO: 247 THOUSANDS/UL (ref 149–390)
PLATELET # BLD AUTO: 300 THOUSANDS/UL (ref 149–390)
PLATELET # BLD AUTO: 301 THOUSANDS/UL (ref 149–390)
PLATELET # BLD AUTO: 305 THOUSANDS/UL (ref 149–390)
PLATELET # BLD AUTO: 307 THOUSANDS/UL (ref 149–390)
PLATELET # BLD AUTO: 319 THOUSANDS/UL (ref 149–390)
PLATELET # BLD AUTO: 322 THOUSANDS/UL (ref 149–390)
PLATELET # BLD AUTO: 334 THOUSANDS/UL (ref 149–390)
PLATELET # BLD AUTO: 343 THOUSANDS/UL (ref 149–390)
PMV BLD AUTO: 10.2 FL (ref 8.9–12.7)
PMV BLD AUTO: 10.3 FL (ref 8.9–12.7)
PMV BLD AUTO: 10.4 FL (ref 8.9–12.7)
PMV BLD AUTO: 10.4 FL (ref 8.9–12.7)
PMV BLD AUTO: 10.5 FL (ref 8.9–12.7)
PMV BLD AUTO: 10.5 FL (ref 8.9–12.7)
PMV BLD AUTO: 10.6 FL (ref 8.9–12.7)
PMV BLD AUTO: 10.7 FL (ref 8.9–12.7)
PMV BLD AUTO: 11 FL (ref 8.9–12.7)
PMV BLD AUTO: 11 FL (ref 8.9–12.7)
PMV BLD AUTO: 11.1 FL (ref 8.9–12.7)
PMV BLD AUTO: 11.2 FL (ref 8.9–12.7)
PMV BLD AUTO: 11.2 FL (ref 8.9–12.7)
PMV BLD AUTO: 11.3 FL (ref 8.9–12.7)
PMV BLD AUTO: 11.4 FL (ref 8.9–12.7)
PMV BLD AUTO: 11.4 FL (ref 8.9–12.7)
PMV BLD AUTO: 11.6 FL (ref 8.9–12.7)
PMV BLD AUTO: 11.8 FL (ref 8.9–12.7)
PMV BLD AUTO: 11.8 FL (ref 8.9–12.7)
PO2 BLD: 107.9 MM HG (ref 75–129)
PO2 BLD: 205 MM HG (ref 35–45)
PO2 BLD: 318 MM HG (ref 75–129)
PO2 BLD: 349 MM HG (ref 75–129)
PO2 BLD: 50 MM HG (ref 35–45)
PO2 BLD: 61 MM HG (ref 35–45)
PO2 BLD: 76 MM HG (ref 35–45)
PO2 BLD: 90.7 MM HG (ref 75–129)
PO2 BLD: 93.5 MM HG (ref 75–129)
PO2 BLD: >400 MM HG (ref 75–129)
PO2 BLD: >400 MM HG (ref 75–129)
PO2 BLDA: 105.2 MM HG (ref 75–129)
PO2 BLDA: 109.1 MM HG (ref 75–129)
PO2 BLDA: 66.8 MM HG (ref 75–129)
PO2 BLDA: 93.6 MM HG (ref 75–129)
PO2 BLDA: 96.4 MM HG (ref 75–129)
PO2 BLDV: 25.4 MM HG (ref 35–45)
PO2 BLDV: 36 MM HG (ref 35–45)
PO2 BLDV: 39.5 MM HG (ref 35–45)
PO2 VENOUS TEMP CORRECTED: 36.3 MM HG (ref 35–45)
PO2 VENOUS TEMP CORRECTED: 38.1 MM HG (ref 35–45)
POTASSIUM BLD-SCNC: 3.4 MMOL/L (ref 3.5–5.3)
POTASSIUM BLD-SCNC: 3.7 MMOL/L (ref 3.5–5.3)
POTASSIUM BLD-SCNC: 3.7 MMOL/L (ref 3.5–5.3)
POTASSIUM BLD-SCNC: 3.8 MMOL/L (ref 3.5–5.3)
POTASSIUM BLD-SCNC: 4.2 MMOL/L (ref 3.5–5.3)
POTASSIUM BLD-SCNC: 4.8 MMOL/L (ref 3.5–5.3)
POTASSIUM BLD-SCNC: 5.1 MMOL/L (ref 3.5–5.3)
POTASSIUM BLD-SCNC: 5.2 MMOL/L (ref 3.5–5.3)
POTASSIUM SERPL-SCNC: 3.3 MMOL/L (ref 3.5–5.3)
POTASSIUM SERPL-SCNC: 3.4 MMOL/L (ref 3.5–5.3)
POTASSIUM SERPL-SCNC: 3.4 MMOL/L (ref 3.5–5.3)
POTASSIUM SERPL-SCNC: 3.5 MMOL/L (ref 3.5–5.3)
POTASSIUM SERPL-SCNC: 3.6 MMOL/L (ref 3.5–5.3)
POTASSIUM SERPL-SCNC: 3.7 MMOL/L (ref 3.5–5.3)
POTASSIUM SERPL-SCNC: 3.8 MMOL/L (ref 3.5–5.3)
POTASSIUM SERPL-SCNC: 3.9 MMOL/L (ref 3.5–5.3)
POTASSIUM SERPL-SCNC: 4 MMOL/L (ref 3.5–5.3)
POTASSIUM SERPL-SCNC: 4 MMOL/L (ref 3.5–5.3)
POTASSIUM SERPL-SCNC: 4.1 MMOL/L (ref 3.5–5.3)
POTASSIUM SERPL-SCNC: 4.2 MMOL/L (ref 3.5–5.3)
POTASSIUM SERPL-SCNC: 4.2 MMOL/L (ref 3.5–5.3)
POTASSIUM SERPL-SCNC: 4.3 MMOL/L (ref 3.5–5.3)
POTASSIUM SERPL-SCNC: 4.4 MMOL/L (ref 3.5–5.3)
POTASSIUM SERPL-SCNC: 4.4 MMOL/L (ref 3.5–5.3)
POTASSIUM SERPL-SCNC: 4.5 MMOL/L (ref 3.5–5.3)
POTASSIUM SERPL-SCNC: 4.6 MMOL/L (ref 3.5–5.3)
POTASSIUM SERPL-SCNC: 4.8 MMOL/L (ref 3.5–5.3)
POTASSIUM SERPL-SCNC: 4.8 MMOL/L (ref 3.5–5.3)
POTASSIUM SERPL-SCNC: 5.1 MMOL/L (ref 3.5–5.3)
POTASSIUM SERPL-SCNC: 5.3 MMOL/L (ref 3.5–5.3)
PR INTERVAL: 120 MS
PR INTERVAL: 192 MS
PROCALCITONIN SERPL-MCNC: 2.39 NG/ML
PROT FLD-MCNC: <3 G/DL
PROT SERPL-MCNC: 6.5 G/DL (ref 6.4–8.4)
PROT SERPL-MCNC: 7.1 G/DL (ref 6.4–8.4)
PROT UR STRIP-MCNC: ABNORMAL MG/DL
PROTHROMBIN TIME: 12.7 SECONDS (ref 11.6–14.5)
PROTHROMBIN TIME: 17.9 SECONDS (ref 11.6–14.5)
PROTHROMBIN TIME: 18.9 SECONDS (ref 11.6–14.5)
PROTHROMBIN TIME: 19.8 SECONDS (ref 11.6–14.5)
PROTHROMBIN TIME: 19.8 SECONDS (ref 11.6–14.5)
PROTHROMBIN TIME: 20.9 SECONDS (ref 11.6–14.5)
PROTHROMBIN TIME: 21.1 SECONDS (ref 11.6–14.5)
PROTHROMBIN TIME: 21.3 SECONDS (ref 11.6–14.5)
PROTHROMBIN TIME: 21.7 SECONDS (ref 11.6–14.5)
PROTHROMBIN TIME: 22.1 SECONDS (ref 11.6–14.5)
PROTHROMBIN TIME: 22.3 SECONDS (ref 11.6–14.5)
PROTHROMBIN TIME: 22.7 SECONDS (ref 11.6–14.5)
PROTHROMBIN TIME: 23.4 SECONDS (ref 11.6–14.5)
PROTHROMBIN TIME: 24.4 SECONDS (ref 11.6–14.5)
PROTHROMBIN TIME: 25.3 SECONDS (ref 11.6–14.5)
PROTHROMBIN TIME: 25.3 SECONDS (ref 11.6–14.5)
PROTHROMBIN TIME: 26.2 SECONDS (ref 11.6–14.5)
PROTHROMBIN TIME: 27.9 SECONDS (ref 11.6–14.5)
PROTHROMBIN TIME: 29.8 SECONDS (ref 11.6–14.5)
PROTHROMBIN TIME: 30.2 SECONDS (ref 11.6–14.5)
PROTHROMBIN TIME: >120 SECONDS (ref 11.6–14.5)
QRS AXIS: 264 DEGREES
QRS AXIS: 67 DEGREES
QRS AXIS: 71 DEGREES
QRS AXIS: 90 DEGREES
QRSD INTERVAL: 112 MS
QRSD INTERVAL: 138 MS
QRSD INTERVAL: 88 MS
QRSD INTERVAL: 92 MS
QT INTERVAL: 290 MS
QT INTERVAL: 362 MS
QT INTERVAL: 368 MS
QT INTERVAL: 424 MS
QTC INTERVAL: 427 MS
QTC INTERVAL: 451 MS
QTC INTERVAL: 459 MS
QTC INTERVAL: 535 MS
RBC # BLD AUTO: 2.46 MILLION/UL (ref 3.81–5.12)
RBC # BLD AUTO: 2.48 MILLION/UL (ref 3.81–5.12)
RBC # BLD AUTO: 2.58 MILLION/UL (ref 3.81–5.12)
RBC # BLD AUTO: 2.58 MILLION/UL (ref 3.81–5.12)
RBC # BLD AUTO: 2.73 MILLION/UL (ref 3.81–5.12)
RBC # BLD AUTO: 2.75 MILLION/UL (ref 3.81–5.12)
RBC # BLD AUTO: 2.76 MILLION/UL (ref 3.81–5.12)
RBC # BLD AUTO: 2.9 MILLION/UL (ref 3.81–5.12)
RBC # BLD AUTO: 2.93 MILLION/UL (ref 3.81–5.12)
RBC # BLD AUTO: 2.96 MILLION/UL (ref 3.81–5.12)
RBC # BLD AUTO: 2.98 MILLION/UL (ref 3.81–5.12)
RBC # BLD AUTO: 2.99 MILLION/UL (ref 3.81–5.12)
RBC # BLD AUTO: 3.06 MILLION/UL (ref 3.81–5.12)
RBC # BLD AUTO: 3.07 MILLION/UL (ref 3.81–5.12)
RBC # BLD AUTO: 3.09 MILLION/UL (ref 3.81–5.12)
RBC # BLD AUTO: 3.11 MILLION/UL (ref 3.81–5.12)
RBC # BLD AUTO: 3.12 MILLION/UL (ref 3.81–5.12)
RBC # BLD AUTO: 3.14 MILLION/UL (ref 3.81–5.12)
RBC # BLD AUTO: 3.22 MILLION/UL (ref 3.81–5.12)
RBC #/AREA URNS AUTO: ABNORMAL /HPF
RH BLD: POSITIVE
SAO2 % BLD FROM PO2: 100 % (ref 60–85)
SAO2 % BLD FROM PO2: 84 % (ref 60–85)
SAO2 % BLD FROM PO2: 85 % (ref 60–85)
SAO2 % BLD FROM PO2: 94 % (ref 60–85)
SITE: ABNORMAL
SODIUM 24H UR-SCNC: 17 MOL/L
SODIUM BLD-SCNC: 128 MMOL/L (ref 136–145)
SODIUM BLD-SCNC: 129 MMOL/L (ref 136–145)
SODIUM BLD-SCNC: 142 MMOL/L (ref 136–145)
SODIUM BLD-SCNC: 143 MMOL/L (ref 136–145)
SODIUM BLD-SCNC: 144 MMOL/L (ref 136–145)
SODIUM BLD-SCNC: 145 MMOL/L (ref 136–145)
SODIUM SERPL-SCNC: 124 MMOL/L (ref 135–147)
SODIUM SERPL-SCNC: 125 MMOL/L (ref 135–147)
SODIUM SERPL-SCNC: 126 MMOL/L (ref 135–147)
SODIUM SERPL-SCNC: 127 MMOL/L (ref 135–147)
SODIUM SERPL-SCNC: 128 MMOL/L (ref 135–147)
SODIUM SERPL-SCNC: 128 MMOL/L (ref 135–147)
SODIUM SERPL-SCNC: 129 MMOL/L (ref 135–147)
SODIUM SERPL-SCNC: 129 MMOL/L (ref 135–147)
SODIUM SERPL-SCNC: 131 MMOL/L (ref 135–147)
SODIUM SERPL-SCNC: 132 MMOL/L (ref 135–147)
SODIUM SERPL-SCNC: 132 MMOL/L (ref 135–147)
SODIUM SERPL-SCNC: 133 MMOL/L (ref 135–147)
SODIUM SERPL-SCNC: 133 MMOL/L (ref 135–147)
SODIUM SERPL-SCNC: 134 MMOL/L (ref 135–147)
SODIUM SERPL-SCNC: 135 MMOL/L (ref 135–147)
SODIUM SERPL-SCNC: 135 MMOL/L (ref 135–147)
SODIUM SERPL-SCNC: 136 MMOL/L (ref 135–147)
SODIUM SERPL-SCNC: 136 MMOL/L (ref 135–147)
SODIUM SERPL-SCNC: 137 MMOL/L (ref 135–147)
SODIUM SERPL-SCNC: 138 MMOL/L (ref 135–147)
SODIUM SERPL-SCNC: 139 MMOL/L (ref 135–147)
SODIUM SERPL-SCNC: 140 MMOL/L (ref 135–147)
SODIUM SERPL-SCNC: 140 MMOL/L (ref 135–147)
SODIUM SERPL-SCNC: 141 MMOL/L (ref 135–147)
SODIUM SERPL-SCNC: 141 MMOL/L (ref 135–147)
SODIUM SERPL-SCNC: 142 MMOL/L (ref 135–147)
SODIUM SERPL-SCNC: 144 MMOL/L (ref 135–147)
SODIUM SERPL-SCNC: 146 MMOL/L (ref 135–147)
SODIUM SERPL-SCNC: 147 MMOL/L (ref 135–147)
SP GR UR STRIP.AUTO: 1.01 (ref 1–1.03)
SPECIMEN EXPIRATION DATE: NORMAL
SPECIMEN SOURCE: ABNORMAL
T WAVE AXIS: 122 DEGREES
T WAVE AXIS: 132 DEGREES
T WAVE AXIS: 269 DEGREES
T WAVE AXIS: 83 DEGREES
TOTAL CELLS COUNTED SPEC: 100
TOTAL PROTEIN FLUID: 2.9 G/DL
TSH SERPL DL<=0.05 MIU/L-ACNC: 1.97 UIU/ML (ref 0.45–4.5)
UNIT DISPENSE STATUS: NORMAL
UNIT PRODUCT CODE: NORMAL
UNIT PRODUCT VOLUME: 125 ML
UNIT PRODUCT VOLUME: 237 ML
UNIT PRODUCT VOLUME: 280 ML
UNIT PRODUCT VOLUME: 300 ML
UNIT PRODUCT VOLUME: 300 ML
UNIT PRODUCT VOLUME: 326 ML
UNIT PRODUCT VOLUME: 328 ML
UNIT PRODUCT VOLUME: 337 ML
UNIT PRODUCT VOLUME: 350 ML
UNIT RH: NORMAL
UROBILINOGEN UR STRIP-ACNC: <2 MG/DL
VENT AC: 12
VENT BIPAP FIO2: 40 %
VENT BIPAP FIO2: 60 %
VENT BIPAP FIO2: 80 %
VENT- AC: AC
VENTRICULAR RATE: 146 BPM
VENTRICULAR RATE: 81 BPM
VENTRICULAR RATE: 96 BPM
VENTRICULAR RATE: 97 BPM
VT SETTING VENT: 450 ML
WBC # BLD AUTO: 10.84 THOUSAND/UL (ref 4.31–10.16)
WBC # BLD AUTO: 10.92 THOUSAND/UL (ref 4.31–10.16)
WBC # BLD AUTO: 10.97 THOUSAND/UL (ref 4.31–10.16)
WBC # BLD AUTO: 11.06 THOUSAND/UL (ref 4.31–10.16)
WBC # BLD AUTO: 11.51 THOUSAND/UL (ref 4.31–10.16)
WBC # BLD AUTO: 12.34 THOUSAND/UL (ref 4.31–10.16)
WBC # BLD AUTO: 12.53 THOUSAND/UL (ref 4.31–10.16)
WBC # BLD AUTO: 12.82 THOUSAND/UL (ref 4.31–10.16)
WBC # BLD AUTO: 12.87 THOUSAND/UL (ref 4.31–10.16)
WBC # BLD AUTO: 13.28 THOUSAND/UL (ref 4.31–10.16)
WBC # BLD AUTO: 13.4 THOUSAND/UL (ref 4.31–10.16)
WBC # BLD AUTO: 15.76 THOUSAND/UL (ref 4.31–10.16)
WBC # BLD AUTO: 16.76 THOUSAND/UL (ref 4.31–10.16)
WBC # BLD AUTO: 17.65 THOUSAND/UL (ref 4.31–10.16)
WBC # BLD AUTO: 19.94 THOUSAND/UL (ref 4.31–10.16)
WBC # BLD AUTO: 21.35 THOUSAND/UL (ref 4.31–10.16)
WBC # BLD AUTO: 26.72 THOUSAND/UL (ref 4.31–10.16)
WBC # BLD AUTO: 27.04 THOUSAND/UL (ref 4.31–10.16)
WBC # BLD AUTO: 9.87 THOUSAND/UL (ref 4.31–10.16)
WBC # FLD MANUAL: 2410 /UL
WBC #/AREA URNS AUTO: ABNORMAL /HPF
WBC CLUMPS # UR AUTO: PRESENT /UL

## 2024-01-01 PROCEDURE — 85610 PROTHROMBIN TIME: CPT | Performed by: PHYSICIAN ASSISTANT

## 2024-01-01 PROCEDURE — A7041 WATER SEAL DRAIN CONTAINER: HCPCS | Performed by: THORACIC SURGERY (CARDIOTHORACIC VASCULAR SURGERY)

## 2024-01-01 PROCEDURE — 85027 COMPLETE CBC AUTOMATED: CPT | Performed by: PHYSICIAN ASSISTANT

## 2024-01-01 PROCEDURE — 84295 ASSAY OF SERUM SODIUM: CPT | Performed by: INTERNAL MEDICINE

## 2024-01-01 PROCEDURE — 94664 DEMO&/EVAL PT USE INHALER: CPT

## 2024-01-01 PROCEDURE — 83880 ASSAY OF NATRIURETIC PEPTIDE: CPT | Performed by: STUDENT IN AN ORGANIZED HEALTH CARE EDUCATION/TRAINING PROGRAM

## 2024-01-01 PROCEDURE — 85018 HEMOGLOBIN: CPT | Performed by: PHYSICIAN ASSISTANT

## 2024-01-01 PROCEDURE — 94640 AIRWAY INHALATION TREATMENT: CPT

## 2024-01-01 PROCEDURE — 94760 N-INVAS EAR/PLS OXIMETRY 1: CPT

## 2024-01-01 PROCEDURE — 83735 ASSAY OF MAGNESIUM: CPT | Performed by: NURSE PRACTITIONER

## 2024-01-01 PROCEDURE — 83735 ASSAY OF MAGNESIUM: CPT | Performed by: PHYSICIAN ASSISTANT

## 2024-01-01 PROCEDURE — 85610 PROTHROMBIN TIME: CPT | Performed by: ANESTHESIOLOGY

## 2024-01-01 PROCEDURE — 85025 COMPLETE CBC W/AUTO DIFF WBC: CPT | Performed by: PHYSICIAN ASSISTANT

## 2024-01-01 PROCEDURE — 30233R1 TRANSFUSION OF NONAUTOLOGOUS PLATELETS INTO PERIPHERAL VEIN, PERCUTANEOUS APPROACH: ICD-10-PCS | Performed by: THORACIC SURGERY (CARDIOTHORACIC VASCULAR SURGERY)

## 2024-01-01 PROCEDURE — 85384 FIBRINOGEN ACTIVITY: CPT | Performed by: NURSE PRACTITIONER

## 2024-01-01 PROCEDURE — 99024 POSTOP FOLLOW-UP VISIT: CPT | Performed by: STUDENT IN AN ORGANIZED HEALTH CARE EDUCATION/TRAINING PROGRAM

## 2024-01-01 PROCEDURE — 93010 ELECTROCARDIOGRAM REPORT: CPT | Performed by: INTERNAL MEDICINE

## 2024-01-01 PROCEDURE — 30233K1 TRANSFUSION OF NONAUTOLOGOUS FROZEN PLASMA INTO PERIPHERAL VEIN, PERCUTANEOUS APPROACH: ICD-10-PCS | Performed by: ANESTHESIOLOGY

## 2024-01-01 PROCEDURE — 88305 TISSUE EXAM BY PATHOLOGIST: CPT | Performed by: PATHOLOGY

## 2024-01-01 PROCEDURE — 82948 REAGENT STRIP/BLOOD GLUCOSE: CPT

## 2024-01-01 PROCEDURE — 82947 ASSAY GLUCOSE BLOOD QUANT: CPT

## 2024-01-01 PROCEDURE — 94668 MNPJ CHEST WALL SBSQ: CPT

## 2024-01-01 PROCEDURE — 80048 BASIC METABOLIC PNL TOTAL CA: CPT | Performed by: PHYSICIAN ASSISTANT

## 2024-01-01 PROCEDURE — 85610 PROTHROMBIN TIME: CPT | Performed by: THORACIC SURGERY (CARDIOTHORACIC VASCULAR SURGERY)

## 2024-01-01 PROCEDURE — 94669 MECHANICAL CHEST WALL OSCILL: CPT

## 2024-01-01 PROCEDURE — 97530 THERAPEUTIC ACTIVITIES: CPT

## 2024-01-01 PROCEDURE — 88112 CYTOPATH CELL ENHANCE TECH: CPT | Performed by: PATHOLOGY

## 2024-01-01 PROCEDURE — 93355 ECHO TRANSESOPHAGEAL (TEE): CPT

## 2024-01-01 PROCEDURE — 83930 ASSAY OF BLOOD OSMOLALITY: CPT | Performed by: INTERNAL MEDICINE

## 2024-01-01 PROCEDURE — 99232 SBSQ HOSP IP/OBS MODERATE 35: CPT | Performed by: INTERNAL MEDICINE

## 2024-01-01 PROCEDURE — 86850 RBC ANTIBODY SCREEN: CPT | Performed by: PHYSICIAN ASSISTANT

## 2024-01-01 PROCEDURE — 82803 BLOOD GASES ANY COMBINATION: CPT

## 2024-01-01 PROCEDURE — 5A1221Z PERFORMANCE OF CARDIAC OUTPUT, CONTINUOUS: ICD-10-PCS | Performed by: THORACIC SURGERY (CARDIOTHORACIC VASCULAR SURGERY)

## 2024-01-01 PROCEDURE — 86901 BLOOD TYPING SEROLOGIC RH(D): CPT | Performed by: PHYSICIAN ASSISTANT

## 2024-01-01 PROCEDURE — 80048 BASIC METABOLIC PNL TOTAL CA: CPT | Performed by: THORACIC SURGERY (CARDIOTHORACIC VASCULAR SURGERY)

## 2024-01-01 PROCEDURE — 86920 COMPATIBILITY TEST SPIN: CPT

## 2024-01-01 PROCEDURE — 80076 HEPATIC FUNCTION PANEL: CPT | Performed by: THORACIC SURGERY (CARDIOTHORACIC VASCULAR SURGERY)

## 2024-01-01 PROCEDURE — 99233 SBSQ HOSP IP/OBS HIGH 50: CPT | Performed by: STUDENT IN AN ORGANIZED HEALTH CARE EDUCATION/TRAINING PROGRAM

## 2024-01-01 PROCEDURE — 84132 ASSAY OF SERUM POTASSIUM: CPT

## 2024-01-01 PROCEDURE — 30233R1 TRANSFUSION OF NONAUTOLOGOUS PLATELETS INTO PERIPHERAL VEIN, PERCUTANEOUS APPROACH: ICD-10-PCS | Performed by: ANESTHESIOLOGY

## 2024-01-01 PROCEDURE — 94002 VENT MGMT INPAT INIT DAY: CPT

## 2024-01-01 PROCEDURE — 85730 THROMBOPLASTIN TIME PARTIAL: CPT | Performed by: PHYSICIAN ASSISTANT

## 2024-01-01 PROCEDURE — 97535 SELF CARE MNGMENT TRAINING: CPT

## 2024-01-01 PROCEDURE — 99223 1ST HOSP IP/OBS HIGH 75: CPT | Performed by: INTERNAL MEDICINE

## 2024-01-01 PROCEDURE — 0BH17EZ INSERTION OF ENDOTRACHEAL AIRWAY INTO TRACHEA, VIA NATURAL OR ARTIFICIAL OPENING: ICD-10-PCS | Performed by: ANESTHESIOLOGY

## 2024-01-01 PROCEDURE — 02HV33Z INSERTION OF INFUSION DEVICE INTO SUPERIOR VENA CAVA, PERCUTANEOUS APPROACH: ICD-10-PCS | Performed by: ANESTHESIOLOGY

## 2024-01-01 PROCEDURE — 85027 COMPLETE CBC AUTOMATED: CPT | Performed by: NURSE PRACTITIONER

## 2024-01-01 PROCEDURE — 30233M1 TRANSFUSION OF NONAUTOLOGOUS PLASMA CRYOPRECIPITATE INTO PERIPHERAL VEIN, PERCUTANEOUS APPROACH: ICD-10-PCS | Performed by: ANESTHESIOLOGY

## 2024-01-01 PROCEDURE — 94660 CPAP INITIATION&MGMT: CPT

## 2024-01-01 PROCEDURE — 82570 ASSAY OF URINE CREATININE: CPT | Performed by: INTERNAL MEDICINE

## 2024-01-01 PROCEDURE — 86880 COOMBS TEST DIRECT: CPT | Performed by: PHYSICIAN ASSISTANT

## 2024-01-01 PROCEDURE — 88364 INSITU HYBRIDIZATION (FISH): CPT | Performed by: PATHOLOGY

## 2024-01-01 PROCEDURE — 88365 INSITU HYBRIDIZATION (FISH): CPT | Performed by: PATHOLOGY

## 2024-01-01 PROCEDURE — 84157 ASSAY OF PROTEIN OTHER: CPT | Performed by: PHYSICIAN ASSISTANT

## 2024-01-01 PROCEDURE — 86922 COMPATIBILITY TEST ANTIGLOB: CPT

## 2024-01-01 PROCEDURE — 99024 POSTOP FOLLOW-UP VISIT: CPT | Performed by: THORACIC SURGERY (CARDIOTHORACIC VASCULAR SURGERY)

## 2024-01-01 PROCEDURE — 82330 ASSAY OF CALCIUM: CPT

## 2024-01-01 PROCEDURE — NC001 PR NO CHARGE: Performed by: ANESTHESIOLOGY

## 2024-01-01 PROCEDURE — 71045 X-RAY EXAM CHEST 1 VIEW: CPT

## 2024-01-01 PROCEDURE — 76377 3D RENDER W/INTRP POSTPROCES: CPT

## 2024-01-01 PROCEDURE — 33405 REPLACEMENT AORTIC VALVE OPN: CPT | Performed by: PHYSICIAN ASSISTANT

## 2024-01-01 PROCEDURE — 30233N0 TRANSFUSION OF AUTOLOGOUS RED BLOOD CELLS INTO PERIPHERAL VEIN, PERCUTANEOUS APPROACH: ICD-10-PCS | Performed by: ANESTHESIOLOGY

## 2024-01-01 PROCEDURE — 93005 ELECTROCARDIOGRAM TRACING: CPT

## 2024-01-01 PROCEDURE — NC001 PR NO CHARGE

## 2024-01-01 PROCEDURE — 86905 BLOOD TYPING RBC ANTIGENS: CPT

## 2024-01-01 PROCEDURE — P9037 PLATE PHERES LEUKOREDU IRRAD: HCPCS

## 2024-01-01 PROCEDURE — 86860 RBC ANTIBODY ELUTION: CPT | Performed by: PHYSICIAN ASSISTANT

## 2024-01-01 PROCEDURE — P9016 RBC LEUKOCYTES REDUCED: HCPCS

## 2024-01-01 PROCEDURE — 87070 CULTURE OTHR SPECIMN AEROBIC: CPT | Performed by: PHYSICIAN ASSISTANT

## 2024-01-01 PROCEDURE — 84295 ASSAY OF SERUM SODIUM: CPT

## 2024-01-01 PROCEDURE — 82272 OCCULT BLD FECES 1-3 TESTS: CPT | Performed by: INTERNAL MEDICINE

## 2024-01-01 PROCEDURE — 85049 AUTOMATED PLATELET COUNT: CPT | Performed by: PHYSICIAN ASSISTANT

## 2024-01-01 PROCEDURE — 99233 SBSQ HOSP IP/OBS HIGH 50: CPT | Performed by: ANESTHESIOLOGY

## 2024-01-01 PROCEDURE — 86900 BLOOD TYPING SEROLOGIC ABO: CPT | Performed by: PHYSICIAN ASSISTANT

## 2024-01-01 PROCEDURE — 97110 THERAPEUTIC EXERCISES: CPT

## 2024-01-01 PROCEDURE — P9012 CRYOPRECIPITATE EACH UNIT: HCPCS

## 2024-01-01 PROCEDURE — 97163 PT EVAL HIGH COMPLEX 45 MIN: CPT

## 2024-01-01 PROCEDURE — 85347 COAGULATION TIME ACTIVATED: CPT | Performed by: NURSE PRACTITIONER

## 2024-01-01 PROCEDURE — 83735 ASSAY OF MAGNESIUM: CPT

## 2024-01-01 PROCEDURE — 97167 OT EVAL HIGH COMPLEX 60 MIN: CPT

## 2024-01-01 PROCEDURE — 80048 BASIC METABOLIC PNL TOTAL CA: CPT

## 2024-01-01 PROCEDURE — 02RF08Z REPLACEMENT OF AORTIC VALVE WITH ZOOPLASTIC TISSUE, OPEN APPROACH: ICD-10-PCS | Performed by: THORACIC SURGERY (CARDIOTHORACIC VASCULAR SURGERY)

## 2024-01-01 PROCEDURE — 82330 ASSAY OF CALCIUM: CPT | Performed by: PHYSICIAN ASSISTANT

## 2024-01-01 PROCEDURE — C1768 GRAFT, VASCULAR: HCPCS | Performed by: THORACIC SURGERY (CARDIOTHORACIC VASCULAR SURGERY)

## 2024-01-01 PROCEDURE — 85014 HEMATOCRIT: CPT

## 2024-01-01 PROCEDURE — 85025 COMPLETE CBC W/AUTO DIFF WBC: CPT

## 2024-01-01 PROCEDURE — 76775 US EXAM ABDO BACK WALL LIM: CPT

## 2024-01-01 PROCEDURE — 99233 SBSQ HOSP IP/OBS HIGH 50: CPT | Performed by: PHYSICIAN ASSISTANT

## 2024-01-01 PROCEDURE — 0T9B70Z DRAINAGE OF BLADDER WITH DRAINAGE DEVICE, VIA NATURAL OR ARTIFICIAL OPENING: ICD-10-PCS | Performed by: THORACIC SURGERY (CARDIOTHORACIC VASCULAR SURGERY)

## 2024-01-01 PROCEDURE — 82945 GLUCOSE OTHER FLUID: CPT | Performed by: PHYSICIAN ASSISTANT

## 2024-01-01 PROCEDURE — 85397 CLOTTING FUNCT ACTIVITY: CPT | Performed by: NURSE PRACTITIONER

## 2024-01-01 PROCEDURE — 85610 PROTHROMBIN TIME: CPT | Performed by: NURSE PRACTITIONER

## 2024-01-01 PROCEDURE — 85347 COAGULATION TIME ACTIVATED: CPT

## 2024-01-01 PROCEDURE — 85730 THROMBOPLASTIN TIME PARTIAL: CPT | Performed by: NURSE PRACTITIONER

## 2024-01-01 PROCEDURE — 33405 REPLACEMENT AORTIC VALVE OPN: CPT | Performed by: THORACIC SURGERY (CARDIOTHORACIC VASCULAR SURGERY)

## 2024-01-01 PROCEDURE — 0W993ZZ DRAINAGE OF RIGHT PLEURAL CAVITY, PERCUTANEOUS APPROACH: ICD-10-PCS | Performed by: RADIOLOGY

## 2024-01-01 PROCEDURE — 84100 ASSAY OF PHOSPHORUS: CPT | Performed by: PHYSICIAN ASSISTANT

## 2024-01-01 PROCEDURE — 30233N1 TRANSFUSION OF NONAUTOLOGOUS RED BLOOD CELLS INTO PERIPHERAL VEIN, PERCUTANEOUS APPROACH: ICD-10-PCS | Performed by: ANESTHESIOLOGY

## 2024-01-01 PROCEDURE — 87040 BLOOD CULTURE FOR BACTERIA: CPT | Performed by: PHYSICIAN ASSISTANT

## 2024-01-01 PROCEDURE — 80076 HEPATIC FUNCTION PANEL: CPT | Performed by: PHYSICIAN ASSISTANT

## 2024-01-01 PROCEDURE — 82805 BLOOD GASES W/O2 SATURATION: CPT

## 2024-01-01 PROCEDURE — 99024 POSTOP FOLLOW-UP VISIT: CPT | Performed by: PHYSICIAN ASSISTANT

## 2024-01-01 PROCEDURE — 99291 CRITICAL CARE FIRST HOUR: CPT | Performed by: ANESTHESIOLOGY

## 2024-01-01 PROCEDURE — 84443 ASSAY THYROID STIM HORMONE: CPT | Performed by: PHYSICIAN ASSISTANT

## 2024-01-01 PROCEDURE — NC001 PR NO CHARGE: Performed by: NURSE PRACTITIONER

## 2024-01-01 PROCEDURE — 88311 DECALCIFY TISSUE: CPT | Performed by: PATHOLOGY

## 2024-01-01 PROCEDURE — 92610 EVALUATE SWALLOWING FUNCTION: CPT

## 2024-01-01 PROCEDURE — 86921 COMPATIBILITY TEST INCUBATE: CPT

## 2024-01-01 PROCEDURE — 85610 PROTHROMBIN TIME: CPT

## 2024-01-01 PROCEDURE — 83986 ASSAY PH BODY FLUID NOS: CPT | Performed by: PHYSICIAN ASSISTANT

## 2024-01-01 PROCEDURE — 85014 HEMATOCRIT: CPT | Performed by: PHYSICIAN ASSISTANT

## 2024-01-01 PROCEDURE — 99233 SBSQ HOSP IP/OBS HIGH 50: CPT | Performed by: INTERNAL MEDICINE

## 2024-01-01 PROCEDURE — 82805 BLOOD GASES W/O2 SATURATION: CPT | Performed by: PHYSICIAN ASSISTANT

## 2024-01-01 PROCEDURE — 85384 FIBRINOGEN ACTIVITY: CPT | Performed by: PHYSICIAN ASSISTANT

## 2024-01-01 PROCEDURE — 85025 COMPLETE CBC W/AUTO DIFF WBC: CPT | Performed by: THORACIC SURGERY (CARDIOTHORACIC VASCULAR SURGERY)

## 2024-01-01 PROCEDURE — 87205 SMEAR GRAM STAIN: CPT | Performed by: PHYSICIAN ASSISTANT

## 2024-01-01 PROCEDURE — 85576 BLOOD PLATELET AGGREGATION: CPT | Performed by: NURSE PRACTITIONER

## 2024-01-01 PROCEDURE — 89051 BODY FLUID CELL COUNT: CPT | Performed by: PHYSICIAN ASSISTANT

## 2024-01-01 PROCEDURE — P9017 PLASMA 1 DONOR FRZ W/IN 8 HR: HCPCS

## 2024-01-01 PROCEDURE — 81001 URINALYSIS AUTO W/SCOPE: CPT | Performed by: PHYSICIAN ASSISTANT

## 2024-01-01 PROCEDURE — 86870 RBC ANTIBODY IDENTIFICATION: CPT | Performed by: PHYSICIAN ASSISTANT

## 2024-01-01 PROCEDURE — 80048 BASIC METABOLIC PNL TOTAL CA: CPT | Performed by: NURSE PRACTITIONER

## 2024-01-01 PROCEDURE — 85049 AUTOMATED PLATELET COUNT: CPT | Performed by: THORACIC SURGERY (CARDIOTHORACIC VASCULAR SURGERY)

## 2024-01-01 PROCEDURE — 84145 PROCALCITONIN (PCT): CPT

## 2024-01-01 PROCEDURE — 85730 THROMBOPLASTIN TIME PARTIAL: CPT | Performed by: ANESTHESIOLOGY

## 2024-01-01 PROCEDURE — 5A1223Z PERFORMANCE OF CARDIAC PACING, CONTINUOUS: ICD-10-PCS | Performed by: THORACIC SURGERY (CARDIOTHORACIC VASCULAR SURGERY)

## 2024-01-01 PROCEDURE — 85027 COMPLETE CBC AUTOMATED: CPT | Performed by: THORACIC SURGERY (CARDIOTHORACIC VASCULAR SURGERY)

## 2024-01-01 PROCEDURE — NC001 PR NO CHARGE: Performed by: PHYSICIAN ASSISTANT

## 2024-01-01 PROCEDURE — 84132 ASSAY OF SERUM POTASSIUM: CPT | Performed by: PHYSICIAN ASSISTANT

## 2024-01-01 PROCEDURE — 94003 VENT MGMT INPAT SUBQ DAY: CPT

## 2024-01-01 PROCEDURE — 32555 ASPIRATE PLEURA W/ IMAGING: CPT | Performed by: PHYSICIAN ASSISTANT

## 2024-01-01 PROCEDURE — 5A1945Z RESPIRATORY VENTILATION, 24-96 CONSECUTIVE HOURS: ICD-10-PCS | Performed by: ANESTHESIOLOGY

## 2024-01-01 PROCEDURE — 30233N1 TRANSFUSION OF NONAUTOLOGOUS RED BLOOD CELLS INTO PERIPHERAL VEIN, PERCUTANEOUS APPROACH: ICD-10-PCS | Performed by: THORACIC SURGERY (CARDIOTHORACIC VASCULAR SURGERY)

## 2024-01-01 PROCEDURE — 84300 ASSAY OF URINE SODIUM: CPT | Performed by: INTERNAL MEDICINE

## 2024-01-01 PROCEDURE — 85384 FIBRINOGEN ACTIVITY: CPT | Performed by: ANESTHESIOLOGY

## 2024-01-01 PROCEDURE — 86902 BLOOD TYPE ANTIGEN DONOR EA: CPT

## 2024-01-01 PROCEDURE — 32555 ASPIRATE PLEURA W/ IMAGING: CPT

## 2024-01-01 PROCEDURE — 83615 LACTATE (LD) (LDH) ENZYME: CPT | Performed by: PHYSICIAN ASSISTANT

## 2024-01-01 DEVICE — BARD® PTFE FELT, 2.5 CM X 15.2 CM
Type: IMPLANTABLE DEVICE | Site: AORTA | Status: FUNCTIONAL
Brand: BARD® PTFE FELT

## 2024-01-01 DEVICE — VALVE AORTIC INSPIRIS RESILIA 21MM: Type: IMPLANTABLE DEVICE | Site: AORTA | Status: FUNCTIONAL

## 2024-01-01 RX ORDER — AMOXICILLIN 250 MG
1 CAPSULE ORAL 2 TIMES DAILY
Status: DISCONTINUED | OUTPATIENT
Start: 2024-01-01 | End: 2024-01-01

## 2024-01-01 RX ORDER — FUROSEMIDE 10 MG/ML
80 INJECTION INTRAMUSCULAR; INTRAVENOUS ONCE
Status: COMPLETED | OUTPATIENT
Start: 2024-01-01 | End: 2024-01-01

## 2024-01-01 RX ORDER — AMIODARONE HYDROCHLORIDE 200 MG/1
200 TABLET ORAL
Status: DISCONTINUED | OUTPATIENT
Start: 2024-01-01 | End: 2024-01-01

## 2024-01-01 RX ORDER — WARFARIN SODIUM 2.5 MG/1
2.5 TABLET ORAL
Status: COMPLETED | OUTPATIENT
Start: 2024-01-01 | End: 2024-01-01

## 2024-01-01 RX ORDER — BUMETANIDE 0.25 MG/ML
1 INJECTION INTRAMUSCULAR; INTRAVENOUS 3 TIMES DAILY
Status: DISCONTINUED | OUTPATIENT
Start: 2024-01-01 | End: 2024-01-01

## 2024-01-01 RX ORDER — SODIUM CHLORIDE, SODIUM LACTATE, POTASSIUM CHLORIDE, CALCIUM CHLORIDE 600; 310; 30; 20 MG/100ML; MG/100ML; MG/100ML; MG/100ML
50 INJECTION, SOLUTION INTRAVENOUS CONTINUOUS
Status: DISCONTINUED | OUTPATIENT
Start: 2024-01-01 | End: 2024-01-01

## 2024-01-01 RX ORDER — BISACODYL 10 MG
10 SUPPOSITORY, RECTAL RECTAL DAILY PRN
Status: DISCONTINUED | OUTPATIENT
Start: 2024-01-01 | End: 2024-01-01 | Stop reason: HOSPADM

## 2024-01-01 RX ORDER — WARFARIN SODIUM 5 MG/1
5 TABLET ORAL
Status: COMPLETED | OUTPATIENT
Start: 2024-01-01 | End: 2024-01-01

## 2024-01-01 RX ORDER — SODIUM CHLORIDE FOR INHALATION 3 %
4 VIAL, NEBULIZER (ML) INHALATION
Status: DISCONTINUED | OUTPATIENT
Start: 2024-01-01 | End: 2024-01-01

## 2024-01-01 RX ORDER — METOCLOPRAMIDE HYDROCHLORIDE 5 MG/ML
10 INJECTION INTRAMUSCULAR; INTRAVENOUS EVERY 6 HOURS PRN
Status: DISCONTINUED | OUTPATIENT
Start: 2024-01-01 | End: 2024-01-01 | Stop reason: HOSPADM

## 2024-01-01 RX ORDER — POTASSIUM CHLORIDE 14.9 MG/ML
20 INJECTION INTRAVENOUS
Qty: 200 ML | Refills: 0 | Status: COMPLETED | OUTPATIENT
Start: 2024-01-01 | End: 2024-01-01

## 2024-01-01 RX ORDER — AMIODARONE HYDROCHLORIDE 200 MG/1
200 TABLET ORAL EVERY 8 HOURS SCHEDULED
Status: DISCONTINUED | OUTPATIENT
Start: 2024-01-01 | End: 2024-01-01

## 2024-01-01 RX ORDER — METOLAZONE 10 MG/1
10 TABLET ORAL ONCE
Status: COMPLETED | OUTPATIENT
Start: 2024-01-01 | End: 2024-01-01

## 2024-01-01 RX ORDER — ACETAMINOPHEN 650 MG/1
650 SUPPOSITORY RECTAL EVERY 4 HOURS PRN
Status: DISCONTINUED | OUTPATIENT
Start: 2024-01-01 | End: 2024-01-01

## 2024-01-01 RX ORDER — BUMETANIDE 0.25 MG/ML
4 INJECTION INTRAMUSCULAR; INTRAVENOUS ONCE
Status: COMPLETED | OUTPATIENT
Start: 2024-01-01 | End: 2024-01-01

## 2024-01-01 RX ORDER — BUMETANIDE 0.25 MG/ML
1 INJECTION INTRAMUSCULAR; INTRAVENOUS 2 TIMES DAILY
Status: DISCONTINUED | OUTPATIENT
Start: 2024-01-01 | End: 2024-01-01

## 2024-01-01 RX ORDER — LORAZEPAM 2 MG/ML
0.5 INJECTION INTRAMUSCULAR
Start: 2024-01-01 | End: 2024-03-08

## 2024-01-01 RX ORDER — HEPARIN SODIUM 5000 [USP'U]/ML
5000 INJECTION, SOLUTION INTRAVENOUS; SUBCUTANEOUS EVERY 8 HOURS SCHEDULED
Status: DISCONTINUED | OUTPATIENT
Start: 2024-01-01 | End: 2024-01-01

## 2024-01-01 RX ORDER — WARFARIN SODIUM 1 MG/1
1 TABLET ORAL
Status: COMPLETED | OUTPATIENT
Start: 2024-01-01 | End: 2024-01-01

## 2024-01-01 RX ORDER — METOLAZONE 5 MG/1
5 TABLET ORAL EVERY 8 HOURS
Status: DISCONTINUED | OUTPATIENT
Start: 2024-01-01 | End: 2024-01-01

## 2024-01-01 RX ORDER — POTASSIUM CHLORIDE 20 MEQ/1
40 TABLET, EXTENDED RELEASE ORAL ONCE
Status: COMPLETED | OUTPATIENT
Start: 2024-01-01 | End: 2024-01-01

## 2024-01-01 RX ORDER — LEVALBUTEROL INHALATION SOLUTION 1.25 MG/3ML
1.25 SOLUTION RESPIRATORY (INHALATION)
Status: DISCONTINUED | OUTPATIENT
Start: 2024-01-01 | End: 2024-01-01

## 2024-01-01 RX ORDER — MAGNESIUM HYDROXIDE 1200 MG/15ML
LIQUID ORAL AS NEEDED
Status: DISCONTINUED | OUTPATIENT
Start: 2024-01-01 | End: 2024-01-01 | Stop reason: HOSPADM

## 2024-01-01 RX ORDER — BUMETANIDE 0.25 MG/ML
1 INJECTION INTRAMUSCULAR; INTRAVENOUS ONCE
Status: COMPLETED | OUTPATIENT
Start: 2024-01-01 | End: 2024-01-01

## 2024-01-01 RX ORDER — POTASSIUM CHLORIDE 20 MEQ/1
20 TABLET, EXTENDED RELEASE ORAL DAILY
Status: DISCONTINUED | OUTPATIENT
Start: 2024-01-01 | End: 2024-01-01

## 2024-01-01 RX ORDER — ASPIRIN 325 MG
325 TABLET ORAL DAILY
Status: DISCONTINUED | OUTPATIENT
Start: 2024-01-01 | End: 2024-01-01

## 2024-01-01 RX ORDER — POLYETHYLENE GLYCOL 3350 17 G/17G
17 POWDER, FOR SOLUTION ORAL DAILY
Status: DISCONTINUED | OUTPATIENT
Start: 2024-01-01 | End: 2024-01-01

## 2024-01-01 RX ORDER — CHLORHEXIDINE GLUCONATE ORAL RINSE 1.2 MG/ML
15 SOLUTION DENTAL EVERY 12 HOURS SCHEDULED
Status: DISCONTINUED | OUTPATIENT
Start: 2024-01-01 | End: 2024-01-01 | Stop reason: HOSPADM

## 2024-01-01 RX ORDER — MILRINONE LACTATE 0.2 MG/ML
0.25 INJECTION, SOLUTION INTRAVENOUS CONTINUOUS
Status: DISCONTINUED | OUTPATIENT
Start: 2024-01-01 | End: 2024-01-01

## 2024-01-01 RX ORDER — POTASSIUM CHLORIDE 14.9 MG/ML
20 INJECTION INTRAVENOUS
Status: COMPLETED | OUTPATIENT
Start: 2024-01-01 | End: 2024-01-01

## 2024-01-01 RX ORDER — VANCOMYCIN HYDROCHLORIDE 1 G/20ML
INJECTION, POWDER, LYOPHILIZED, FOR SOLUTION INTRAVENOUS AS NEEDED
Status: DISCONTINUED | OUTPATIENT
Start: 2024-01-01 | End: 2024-01-01 | Stop reason: HOSPADM

## 2024-01-01 RX ORDER — POTASSIUM CHLORIDE 20 MEQ/1
20 TABLET, EXTENDED RELEASE ORAL 2 TIMES DAILY
Status: DISCONTINUED | OUTPATIENT
Start: 2024-01-01 | End: 2024-01-01

## 2024-01-01 RX ORDER — FUROSEMIDE 10 MG/ML
40 SYRINGE (ML) INJECTION CONTINUOUS
Status: DISCONTINUED | OUTPATIENT
Start: 2024-01-01 | End: 2024-01-01

## 2024-01-01 RX ORDER — BUMETANIDE 0.25 MG/ML
2 INJECTION INTRAMUSCULAR; INTRAVENOUS ONCE
Status: COMPLETED | OUTPATIENT
Start: 2024-01-01 | End: 2024-01-01

## 2024-01-01 RX ORDER — ASCORBIC ACID 500 MG
500 TABLET ORAL 2 TIMES DAILY
Status: DISCONTINUED | OUTPATIENT
Start: 2024-01-01 | End: 2024-01-01

## 2024-01-01 RX ORDER — POTASSIUM CHLORIDE 29.8 MG/ML
40 INJECTION INTRAVENOUS ONCE
Status: COMPLETED | OUTPATIENT
Start: 2024-01-01 | End: 2024-01-01

## 2024-01-01 RX ORDER — BUMETANIDE 0.25 MG/ML
1 INJECTION INTRAMUSCULAR; INTRAVENOUS CONTINUOUS
Status: DISCONTINUED | OUTPATIENT
Start: 2024-01-01 | End: 2024-01-01

## 2024-01-01 RX ORDER — GABAPENTIN 100 MG/1
100 CAPSULE ORAL 3 TIMES DAILY
Status: DISCONTINUED | OUTPATIENT
Start: 2024-01-01 | End: 2024-01-01

## 2024-01-01 RX ORDER — ALBUMIN (HUMAN) 12.5 G/50ML
25 SOLUTION INTRAVENOUS ONCE
Status: COMPLETED | OUTPATIENT
Start: 2024-01-01 | End: 2024-01-01

## 2024-01-01 RX ORDER — SODIUM CHLORIDE 450 MG/100ML
20 INJECTION, SOLUTION INTRAVENOUS CONTINUOUS
Status: DISCONTINUED | OUTPATIENT
Start: 2024-01-01 | End: 2024-01-01

## 2024-01-01 RX ORDER — POTASSIUM CHLORIDE 29.8 MG/ML
40 INJECTION INTRAVENOUS
Status: DISCONTINUED | OUTPATIENT
Start: 2024-01-01 | End: 2024-01-01

## 2024-01-01 RX ORDER — METOCLOPRAMIDE HYDROCHLORIDE 5 MG/ML
10 INJECTION INTRAMUSCULAR; INTRAVENOUS EVERY 6 HOURS PRN
Qty: 2 ML | Refills: 0 | Status: SHIPPED | OUTPATIENT
Start: 2024-01-01 | End: 2024-03-08

## 2024-01-01 RX ORDER — FONDAPARINUX SODIUM 2.5 MG/.5ML
2.5 INJECTION SUBCUTANEOUS DAILY
Status: DISCONTINUED | OUTPATIENT
Start: 2024-01-01 | End: 2024-01-01

## 2024-01-01 RX ORDER — ONDANSETRON 2 MG/ML
4 INJECTION INTRAMUSCULAR; INTRAVENOUS EVERY 6 HOURS PRN
Status: DISCONTINUED | OUTPATIENT
Start: 2024-01-01 | End: 2024-01-01

## 2024-01-01 RX ORDER — CALCIUM GLUCONATE 20 MG/ML
1 INJECTION, SOLUTION INTRAVENOUS ONCE
Status: DISCONTINUED | OUTPATIENT
Start: 2024-01-01 | End: 2024-01-01

## 2024-01-01 RX ORDER — HYDROMORPHONE HCL/PF 1 MG/ML
0.5 SYRINGE (ML) INJECTION
Status: DISCONTINUED | OUTPATIENT
Start: 2024-01-01 | End: 2024-01-01 | Stop reason: HOSPADM

## 2024-01-01 RX ORDER — FENTANYL CITRATE 50 UG/ML
50 INJECTION, SOLUTION INTRAMUSCULAR; INTRAVENOUS ONCE
Status: DISCONTINUED | OUTPATIENT
Start: 2024-01-01 | End: 2024-01-01

## 2024-01-01 RX ORDER — CALCIUM CHLORIDE 100 MG/ML
1 INJECTION INTRAVENOUS; INTRAVENTRICULAR ONCE
Status: COMPLETED | OUTPATIENT
Start: 2024-01-01 | End: 2024-01-01

## 2024-01-01 RX ORDER — INSULIN LISPRO 100 [IU]/ML
1-5 INJECTION, SOLUTION INTRAVENOUS; SUBCUTANEOUS
Status: DISCONTINUED | OUTPATIENT
Start: 2024-01-01 | End: 2024-01-01

## 2024-01-01 RX ORDER — CEFAZOLIN SODIUM 2 G/50ML
2000 SOLUTION INTRAVENOUS EVERY 8 HOURS
Qty: 150 ML | Refills: 0 | Status: COMPLETED | OUTPATIENT
Start: 2024-01-01 | End: 2024-01-01

## 2024-01-01 RX ORDER — AMIODARONE HYDROCHLORIDE 50 MG/ML
INJECTION, SOLUTION INTRAVENOUS
Status: COMPLETED
Start: 2024-01-01 | End: 2024-01-01

## 2024-01-01 RX ORDER — HEPARIN SODIUM 1000 [USP'U]/ML
10000 INJECTION, SOLUTION INTRAVENOUS; SUBCUTANEOUS ONCE
Status: COMPLETED | OUTPATIENT
Start: 2024-01-01 | End: 2024-01-01

## 2024-01-01 RX ORDER — HALOPERIDOL 5 MG/ML
1 INJECTION INTRAMUSCULAR
Start: 2024-01-01 | End: 2024-03-08

## 2024-01-01 RX ORDER — CHLORHEXIDINE GLUCONATE ORAL RINSE 1.2 MG/ML
15 SOLUTION DENTAL ONCE
Status: COMPLETED | OUTPATIENT
Start: 2024-01-01 | End: 2024-01-01

## 2024-01-01 RX ORDER — LORAZEPAM 2 MG/ML
0.5 INJECTION INTRAMUSCULAR
Status: DISCONTINUED | OUTPATIENT
Start: 2024-01-01 | End: 2024-01-01 | Stop reason: HOSPADM

## 2024-01-01 RX ORDER — INSULIN LISPRO 100 [IU]/ML
1-6 INJECTION, SOLUTION INTRAVENOUS; SUBCUTANEOUS EVERY 6 HOURS SCHEDULED
Status: DISCONTINUED | OUTPATIENT
Start: 2024-01-01 | End: 2024-01-01

## 2024-01-01 RX ORDER — DEXTROSE AND SODIUM CHLORIDE 5; .9 G/100ML; G/100ML
50 INJECTION, SOLUTION INTRAVENOUS CONTINUOUS
Status: DISCONTINUED | OUTPATIENT
Start: 2024-01-01 | End: 2024-01-01

## 2024-01-01 RX ORDER — BUMETANIDE 0.25 MG/ML
2 INJECTION INTRAMUSCULAR; INTRAVENOUS 3 TIMES DAILY
Status: DISCONTINUED | OUTPATIENT
Start: 2024-01-01 | End: 2024-01-01

## 2024-01-01 RX ORDER — CHLORHEXIDINE GLUCONATE ORAL RINSE 1.2 MG/ML
15 SOLUTION DENTAL 2 TIMES DAILY
Status: DISCONTINUED | OUTPATIENT
Start: 2024-01-01 | End: 2024-01-01

## 2024-01-01 RX ORDER — POTASSIUM CHLORIDE 20 MEQ/1
20 TABLET, EXTENDED RELEASE ORAL
Status: DISCONTINUED | OUTPATIENT
Start: 2024-01-01 | End: 2024-01-01

## 2024-01-01 RX ORDER — ESMOLOL HYDROCHLORIDE 10 MG/ML
50 INJECTION INTRAVENOUS ONCE
Status: COMPLETED | OUTPATIENT
Start: 2024-01-01 | End: 2024-01-01

## 2024-01-01 RX ORDER — OXYCODONE HYDROCHLORIDE 5 MG/1
5 TABLET ORAL EVERY 4 HOURS PRN
Status: DISCONTINUED | OUTPATIENT
Start: 2024-01-01 | End: 2024-01-01

## 2024-01-01 RX ORDER — DIGOXIN 0.25 MG/ML
250 INJECTION INTRAMUSCULAR; INTRAVENOUS ONCE
Status: COMPLETED | OUTPATIENT
Start: 2024-01-01 | End: 2024-01-01

## 2024-01-01 RX ORDER — HALOPERIDOL 5 MG/ML
1 INJECTION INTRAMUSCULAR
Status: DISCONTINUED | OUTPATIENT
Start: 2024-01-01 | End: 2024-01-01 | Stop reason: HOSPADM

## 2024-01-01 RX ORDER — BUMETANIDE 0.25 MG/ML
2 INJECTION INTRAMUSCULAR; INTRAVENOUS CONTINUOUS
Status: DISCONTINUED | OUTPATIENT
Start: 2024-01-01 | End: 2024-01-01

## 2024-01-01 RX ORDER — DEXMEDETOMIDINE HYDROCHLORIDE 4 UG/ML
.1-.7 INJECTION, SOLUTION INTRAVENOUS
Status: DISCONTINUED | OUTPATIENT
Start: 2024-01-01 | End: 2024-01-01

## 2024-01-01 RX ORDER — BISACODYL 5 MG/1
10 TABLET, DELAYED RELEASE ORAL ONCE
Status: COMPLETED | OUTPATIENT
Start: 2024-01-01 | End: 2024-01-01

## 2024-01-01 RX ORDER — ATORVASTATIN CALCIUM 80 MG/1
80 TABLET, FILM COATED ORAL
Status: DISCONTINUED | OUTPATIENT
Start: 2024-01-01 | End: 2024-01-01

## 2024-01-01 RX ORDER — HEPARIN SODIUM 1000 [USP'U]/ML
400 INJECTION, SOLUTION INTRAVENOUS; SUBCUTANEOUS ONCE
Status: COMPLETED | OUTPATIENT
Start: 2024-01-01 | End: 2024-01-01

## 2024-01-01 RX ORDER — METOLAZONE 10 MG/1
10 TABLET ORAL DAILY
Status: DISCONTINUED | OUTPATIENT
Start: 2024-01-01 | End: 2024-01-01

## 2024-01-01 RX ORDER — METOLAZONE 10 MG/1
10 TABLET ORAL EVERY 8 HOURS
Status: DISCONTINUED | OUTPATIENT
Start: 2024-01-01 | End: 2024-01-01

## 2024-01-01 RX ORDER — GABAPENTIN 400 MG/1
800 CAPSULE ORAL 3 TIMES DAILY
Status: DISCONTINUED | OUTPATIENT
Start: 2024-01-01 | End: 2024-01-01

## 2024-01-01 RX ORDER — ALBUMIN (HUMAN) 12.5 G/50ML
25 SOLUTION INTRAVENOUS EVERY 8 HOURS
Qty: 300 ML | Refills: 0 | Status: DISCONTINUED | OUTPATIENT
Start: 2024-01-01 | End: 2024-01-01

## 2024-01-01 RX ORDER — HYDROMORPHONE HCL/PF 1 MG/ML
0.5 SYRINGE (ML) INJECTION EVERY 2 HOUR PRN
Status: DISCONTINUED | OUTPATIENT
Start: 2024-01-01 | End: 2024-01-01

## 2024-01-01 RX ORDER — PRAMIPEXOLE DIHYDROCHLORIDE 1 MG/1
1 TABLET ORAL
Status: DISCONTINUED | OUTPATIENT
Start: 2024-01-01 | End: 2024-01-01

## 2024-01-01 RX ORDER — FUROSEMIDE 10 MG/ML
40 INJECTION INTRAMUSCULAR; INTRAVENOUS ONCE
Status: COMPLETED | OUTPATIENT
Start: 2024-01-01 | End: 2024-01-01

## 2024-01-01 RX ORDER — NYSTATIN 100000 [USP'U]/G
POWDER TOPICAL 2 TIMES DAILY
Status: DISCONTINUED | OUTPATIENT
Start: 2024-01-01 | End: 2024-01-01 | Stop reason: HOSPADM

## 2024-01-01 RX ORDER — SODIUM CHLORIDE 9 MG/ML
50 INJECTION, SOLUTION INTRAVENOUS CONTINUOUS
Status: DISCONTINUED | OUTPATIENT
Start: 2024-01-01 | End: 2024-01-01

## 2024-01-01 RX ORDER — MIRTAZAPINE 15 MG/1
7.5 TABLET, FILM COATED ORAL
Status: DISCONTINUED | OUTPATIENT
Start: 2024-01-01 | End: 2024-01-01

## 2024-01-01 RX ORDER — METOCLOPRAMIDE HYDROCHLORIDE 5 MG/ML
10 INJECTION INTRAMUSCULAR; INTRAVENOUS EVERY 6 HOURS PRN
Status: DISCONTINUED | OUTPATIENT
Start: 2024-01-01 | End: 2024-01-01

## 2024-01-01 RX ORDER — ALBUMIN, HUMAN INJ 5% 5 %
12.5 SOLUTION INTRAVENOUS ONCE
Status: COMPLETED | OUTPATIENT
Start: 2024-01-01 | End: 2024-01-01

## 2024-01-01 RX ORDER — PHENYLEPHRINE HCL IN 0.9% NACL 1 MG/10 ML
200-2000 SYRINGE (ML) INTRAVENOUS ONCE
Status: CANCELLED | OUTPATIENT
Start: 2024-01-01

## 2024-01-01 RX ORDER — FERROUS SULFATE 325(65) MG
325 TABLET ORAL
Status: DISCONTINUED | OUTPATIENT
Start: 2024-01-01 | End: 2024-01-01

## 2024-01-01 RX ORDER — BISACODYL 5 MG/1
5 TABLET, DELAYED RELEASE ORAL DAILY PRN
Status: DISCONTINUED | OUTPATIENT
Start: 2024-01-01 | End: 2024-01-01

## 2024-01-01 RX ORDER — POTASSIUM CHLORIDE 20MEQ/15ML
20 LIQUID (ML) ORAL ONCE
Status: COMPLETED | OUTPATIENT
Start: 2024-01-01 | End: 2024-01-01

## 2024-01-01 RX ORDER — CALCIUM CHLORIDE 100 MG/ML
1 INJECTION INTRAVENOUS; INTRAVENTRICULAR ONCE AS NEEDED
Status: COMPLETED | OUTPATIENT
Start: 2024-01-01 | End: 2024-01-01

## 2024-01-01 RX ORDER — MAGNESIUM SULFATE HEPTAHYDRATE 40 MG/ML
2 INJECTION, SOLUTION INTRAVENOUS ONCE
Status: COMPLETED | OUTPATIENT
Start: 2024-01-01 | End: 2024-01-01

## 2024-01-01 RX ORDER — IPRATROPIUM BROMIDE AND ALBUTEROL SULFATE 2.5; .5 MG/3ML; MG/3ML
3 SOLUTION RESPIRATORY (INHALATION) ONCE
Status: DISCONTINUED | OUTPATIENT
Start: 2024-01-01 | End: 2024-01-01

## 2024-01-01 RX ORDER — BUMETANIDE 0.25 MG/ML
3 INJECTION INTRAMUSCULAR; INTRAVENOUS ONCE
Status: DISCONTINUED | OUTPATIENT
Start: 2024-01-01 | End: 2024-01-01

## 2024-01-01 RX ORDER — CIPROFLOXACIN 2 MG/ML
400 INJECTION, SOLUTION INTRAVENOUS ONCE
Status: DISCONTINUED | OUTPATIENT
Start: 2024-01-01 | End: 2024-01-01 | Stop reason: HOSPADM

## 2024-01-01 RX ORDER — ALBUMIN (HUMAN) 12.5 G/50ML
25 SOLUTION INTRAVENOUS EVERY 8 HOURS SCHEDULED
Qty: 300 ML | Refills: 0 | Status: COMPLETED | OUTPATIENT
Start: 2024-01-01 | End: 2024-01-01

## 2024-01-01 RX ORDER — BUMETANIDE 0.25 MG/ML
2 INJECTION INTRAMUSCULAR; INTRAVENOUS 2 TIMES DAILY
Status: DISCONTINUED | OUTPATIENT
Start: 2024-01-01 | End: 2024-01-01

## 2024-01-01 RX ORDER — METOPROLOL TARTRATE 50 MG/1
50 TABLET, FILM COATED ORAL EVERY 12 HOURS SCHEDULED
Status: DISCONTINUED | OUTPATIENT
Start: 2024-01-01 | End: 2024-01-01

## 2024-01-01 RX ORDER — PANTOPRAZOLE SODIUM 40 MG/1
40 TABLET, DELAYED RELEASE ORAL
Status: DISCONTINUED | OUTPATIENT
Start: 2024-01-01 | End: 2024-01-01

## 2024-01-01 RX ORDER — POTASSIUM CHLORIDE 14.9 MG/ML
20 INJECTION INTRAVENOUS ONCE AS NEEDED
Status: COMPLETED | OUTPATIENT
Start: 2024-01-01 | End: 2024-01-01

## 2024-01-01 RX ORDER — ACETAMINOPHEN 325 MG/1
650 TABLET ORAL
Status: DISCONTINUED | OUTPATIENT
Start: 2024-01-01 | End: 2024-01-01

## 2024-01-01 RX ORDER — GABAPENTIN 100 MG/1
200 CAPSULE ORAL 3 TIMES DAILY
Status: DISCONTINUED | OUTPATIENT
Start: 2024-01-01 | End: 2024-01-01

## 2024-01-01 RX ORDER — LIDOCAINE HYDROCHLORIDE 10 MG/ML
INJECTION, SOLUTION EPIDURAL; INFILTRATION; INTRACAUDAL; PERINEURAL AS NEEDED
Status: COMPLETED | OUTPATIENT
Start: 2024-01-01 | End: 2024-01-01

## 2024-01-01 RX ORDER — TEMAZEPAM 15 MG/1
15 CAPSULE ORAL
Status: DISCONTINUED | OUTPATIENT
Start: 2024-01-01 | End: 2024-01-01

## 2024-01-01 RX ORDER — MILRINONE LACTATE 0.2 MG/ML
0.13 INJECTION, SOLUTION INTRAVENOUS CONTINUOUS
Status: DISCONTINUED | OUTPATIENT
Start: 2024-01-01 | End: 2024-01-01

## 2024-01-01 RX ORDER — CALCIUM GLUCONATE 20 MG/ML
2 INJECTION, SOLUTION INTRAVENOUS ONCE
Status: COMPLETED | OUTPATIENT
Start: 2024-01-01 | End: 2024-01-01

## 2024-01-01 RX ORDER — BISACODYL 10 MG
10 SUPPOSITORY, RECTAL RECTAL DAILY PRN
Status: DISCONTINUED | OUTPATIENT
Start: 2024-01-01 | End: 2024-01-01

## 2024-01-01 RX ORDER — FENTANYL CITRATE 50 UG/ML
50 INJECTION, SOLUTION INTRAMUSCULAR; INTRAVENOUS
Status: DISCONTINUED | OUTPATIENT
Start: 2024-01-01 | End: 2024-01-01

## 2024-01-01 RX ORDER — CALCIUM CHLORIDE 100 MG/ML
INJECTION INTRAVENOUS; INTRAVENTRICULAR
Status: COMPLETED
Start: 2024-01-01 | End: 2024-01-01

## 2024-01-01 RX ADMIN — LEVALBUTEROL HYDROCHLORIDE 1.25 MG: 1.25 SOLUTION RESPIRATORY (INHALATION) at 00:27

## 2024-01-01 RX ADMIN — POTASSIUM CHLORIDE 20 MEQ: 14.9 INJECTION, SOLUTION INTRAVENOUS at 12:55

## 2024-01-01 RX ADMIN — CHLORHEXIDINE GLUCONATE 15 ML: 1.2 SOLUTION ORAL at 21:26

## 2024-01-01 RX ADMIN — CHLORHEXIDINE GLUCONATE 15 ML: 1.2 SOLUTION ORAL at 09:02

## 2024-01-01 RX ADMIN — NOREPINEPHRINE BITARTRATE 14 MCG/MIN: 1 SOLUTION INTRAVENOUS at 00:09

## 2024-01-01 RX ADMIN — METOLAZONE 10 MG: 10 TABLET ORAL at 13:22

## 2024-01-01 RX ADMIN — SODIUM CHLORIDE SOLN NEBU 3% 4 ML: 3 NEBU SOLN at 19:29

## 2024-01-01 RX ADMIN — Medication 1 MG/HR: at 22:47

## 2024-01-01 RX ADMIN — INSULIN LISPRO 1 UNITS: 100 INJECTION, SOLUTION INTRAVENOUS; SUBCUTANEOUS at 11:26

## 2024-01-01 RX ADMIN — SENNOSIDES, DOCUSATE SODIUM 1 TABLET: 8.6; 5 TABLET ORAL at 08:27

## 2024-01-01 RX ADMIN — ACETAMINOPHEN 650 MG: 325 TABLET, FILM COATED ORAL at 14:24

## 2024-01-01 RX ADMIN — AMIODARONE HYDROCHLORIDE 200 MG: 200 TABLET ORAL at 06:13

## 2024-01-01 RX ADMIN — METOPROLOL TARTRATE 50 MG: 50 TABLET, FILM COATED ORAL at 20:18

## 2024-01-01 RX ADMIN — CHLORHEXIDINE GLUCONATE 15 ML: 1.2 SOLUTION ORAL at 08:03

## 2024-01-01 RX ADMIN — AMIODARONE HYDROCHLORIDE 200 MG: 200 TABLET ORAL at 21:03

## 2024-01-01 RX ADMIN — BUMETANIDE 2 MG: 0.25 INJECTION INTRAMUSCULAR; INTRAVENOUS at 15:47

## 2024-01-01 RX ADMIN — HYDROMORPHONE HYDROCHLORIDE 0.5 MG: 1 INJECTION, SOLUTION INTRAMUSCULAR; INTRAVENOUS; SUBCUTANEOUS at 11:03

## 2024-01-01 RX ADMIN — MIRTAZAPINE 7.5 MG: 15 TABLET, FILM COATED ORAL at 21:00

## 2024-01-01 RX ADMIN — WARFARIN SODIUM 5 MG: 5 TABLET ORAL at 17:28

## 2024-01-01 RX ADMIN — FUROSEMIDE 80 MG: 10 INJECTION, SOLUTION INTRAMUSCULAR; INTRAVENOUS at 22:07

## 2024-01-01 RX ADMIN — ASPIRIN 81 MG: 81 TABLET, COATED ORAL at 09:30

## 2024-01-01 RX ADMIN — SODIUM CHLORIDE SOLN NEBU 3% 4 ML: 3 NEBU SOLN at 00:28

## 2024-01-01 RX ADMIN — ACETAMINOPHEN 650 MG: 325 TABLET, FILM COATED ORAL at 21:25

## 2024-01-01 RX ADMIN — SENNOSIDES, DOCUSATE SODIUM 1 TABLET: 8.6; 5 TABLET ORAL at 17:43

## 2024-01-01 RX ADMIN — AMIODARONE HYDROCHLORIDE 200 MG: 200 TABLET ORAL at 05:22

## 2024-01-01 RX ADMIN — SODIUM CHLORIDE SOLN NEBU 3% 4 ML: 3 NEBU SOLN at 19:06

## 2024-01-01 RX ADMIN — CHLORHEXIDINE GLUCONATE 15 ML: 1.2 SOLUTION ORAL at 06:48

## 2024-01-01 RX ADMIN — MUPIROCIN 1 APPLICATION: 20 OINTMENT TOPICAL at 08:08

## 2024-01-01 RX ADMIN — GABAPENTIN 200 MG: 100 CAPSULE ORAL at 09:30

## 2024-01-01 RX ADMIN — POTASSIUM CHLORIDE 20 MEQ: 14.9 INJECTION, SOLUTION INTRAVENOUS at 01:19

## 2024-01-01 RX ADMIN — MILRINONE LACTATE IN DEXTROSE 0.13 MCG/KG/MIN: 200 INJECTION, SOLUTION INTRAVENOUS at 20:43

## 2024-01-01 RX ADMIN — AMIODARONE HYDROCHLORIDE 200 MG: 200 TABLET ORAL at 05:46

## 2024-01-01 RX ADMIN — MUPIROCIN 1 APPLICATION: 20 OINTMENT TOPICAL at 21:18

## 2024-01-01 RX ADMIN — INSULIN LISPRO 1 UNITS: 100 INJECTION, SOLUTION INTRAVENOUS; SUBCUTANEOUS at 21:19

## 2024-01-01 RX ADMIN — BISACODYL 10 MG: 5 TABLET, COATED ORAL at 17:54

## 2024-01-01 RX ADMIN — GABAPENTIN 800 MG: 400 CAPSULE ORAL at 20:38

## 2024-01-01 RX ADMIN — AMIODARONE HYDROCHLORIDE 1 MG/MIN: 50 INJECTION, SOLUTION INTRAVENOUS at 20:31

## 2024-01-01 RX ADMIN — AMIODARONE HYDROCHLORIDE 200 MG: 200 TABLET ORAL at 13:31

## 2024-01-01 RX ADMIN — GABAPENTIN 200 MG: 100 CAPSULE ORAL at 08:56

## 2024-01-01 RX ADMIN — AMIODARONE HYDROCHLORIDE 200 MG: 200 TABLET ORAL at 05:53

## 2024-01-01 RX ADMIN — PANTOPRAZOLE SODIUM 40 MG: 40 TABLET, DELAYED RELEASE ORAL at 05:59

## 2024-01-01 RX ADMIN — OXYCODONE HYDROCHLORIDE 5 MG: 5 TABLET ORAL at 18:43

## 2024-01-01 RX ADMIN — AMIODARONE HYDROCHLORIDE 200 MG: 200 TABLET ORAL at 14:35

## 2024-01-01 RX ADMIN — OXYCODONE HYDROCHLORIDE AND ACETAMINOPHEN 500 MG: 500 TABLET ORAL at 08:33

## 2024-01-01 RX ADMIN — CEFAZOLIN SODIUM 2000 MG: 2 SOLUTION INTRAVENOUS at 18:11

## 2024-01-01 RX ADMIN — SENNOSIDES, DOCUSATE SODIUM 1 TABLET: 8.6; 5 TABLET ORAL at 21:41

## 2024-01-01 RX ADMIN — LEVALBUTEROL HYDROCHLORIDE 1.25 MG: 1.25 SOLUTION RESPIRATORY (INHALATION) at 07:51

## 2024-01-01 RX ADMIN — HEPARIN SODIUM 5000 UNITS: 5000 INJECTION INTRAVENOUS; SUBCUTANEOUS at 05:46

## 2024-01-01 RX ADMIN — BUMETANIDE 2 MG: 0.25 INJECTION INTRAMUSCULAR; INTRAVENOUS at 17:17

## 2024-01-01 RX ADMIN — SODIUM CHLORIDE SOLN NEBU 3% 4 ML: 3 NEBU SOLN at 19:49

## 2024-01-01 RX ADMIN — MUPIROCIN 1 APPLICATION: 20 OINTMENT TOPICAL at 08:54

## 2024-01-01 RX ADMIN — OXYCODONE HYDROCHLORIDE 5 MG: 5 TABLET ORAL at 21:03

## 2024-01-01 RX ADMIN — AMIODARONE HYDROCHLORIDE 200 MG: 200 TABLET ORAL at 14:47

## 2024-01-01 RX ADMIN — POTASSIUM CHLORIDE 40 MEQ: 1500 TABLET, EXTENDED RELEASE ORAL at 08:56

## 2024-01-01 RX ADMIN — DEXTROSE 150 MG: 50 INJECTION, SOLUTION INTRAVENOUS at 03:18

## 2024-01-01 RX ADMIN — OXYCODONE HYDROCHLORIDE AND ACETAMINOPHEN 500 MG: 500 TABLET ORAL at 08:54

## 2024-01-01 RX ADMIN — Medication 2 MG/HR: at 06:08

## 2024-01-01 RX ADMIN — ASPIRIN 325 MG ORAL TABLET 325 MG: 325 PILL ORAL at 08:33

## 2024-01-01 RX ADMIN — FENTANYL CITRATE 50 MCG: 50 INJECTION INTRAMUSCULAR; INTRAVENOUS at 18:10

## 2024-01-01 RX ADMIN — ATORVASTATIN CALCIUM 80 MG: 80 TABLET, FILM COATED ORAL at 15:54

## 2024-01-01 RX ADMIN — CHLORHEXIDINE GLUCONATE 15 ML: 1.2 SOLUTION ORAL at 08:56

## 2024-01-01 RX ADMIN — WARFARIN SODIUM 2.5 MG: 2.5 TABLET ORAL at 12:42

## 2024-01-01 RX ADMIN — WARFARIN SODIUM 2.5 MG: 2.5 TABLET ORAL at 17:08

## 2024-01-01 RX ADMIN — Medication 4 MG/HR: at 03:43

## 2024-01-01 RX ADMIN — ATORVASTATIN CALCIUM 80 MG: 80 TABLET, FILM COATED ORAL at 15:41

## 2024-01-01 RX ADMIN — Medication 2 MG/HR: at 20:17

## 2024-01-01 RX ADMIN — SENNOSIDES, DOCUSATE SODIUM 1 TABLET: 8.6; 5 TABLET ORAL at 17:37

## 2024-01-01 RX ADMIN — GABAPENTIN 800 MG: 400 CAPSULE ORAL at 21:04

## 2024-01-01 RX ADMIN — ACETAMINOPHEN 650 MG: 325 TABLET, FILM COATED ORAL at 00:34

## 2024-01-01 RX ADMIN — PRAMIPEXOLE DIHYDROCHLORIDE 1 MG: 1 TABLET ORAL at 21:03

## 2024-01-01 RX ADMIN — INSULIN LISPRO 1 UNITS: 100 INJECTION, SOLUTION INTRAVENOUS; SUBCUTANEOUS at 13:27

## 2024-01-01 RX ADMIN — POLYETHYLENE GLYCOL 3350 17 G: 17 POWDER, FOR SOLUTION ORAL at 08:54

## 2024-01-01 RX ADMIN — OXYCODONE HYDROCHLORIDE 5 MG: 5 TABLET ORAL at 08:33

## 2024-01-01 RX ADMIN — POTASSIUM CHLORIDE 20 MEQ: 1.5 SOLUTION ORAL at 06:14

## 2024-01-01 RX ADMIN — Medication 4 MG/HR: at 16:53

## 2024-01-01 RX ADMIN — Medication 2 MG/HR: at 15:18

## 2024-01-01 RX ADMIN — HEPARIN SODIUM 5000 UNITS: 5000 INJECTION INTRAVENOUS; SUBCUTANEOUS at 13:31

## 2024-01-01 RX ADMIN — ALBUMIN (HUMAN) 25 G: 0.25 INJECTION, SOLUTION INTRAVENOUS at 23:26

## 2024-01-01 RX ADMIN — ESMOLOL HYDROCHLORIDE 50 MG: 100 INJECTION, SOLUTION INTRAVENOUS at 10:10

## 2024-01-01 RX ADMIN — METOLAZONE 10 MG: 10 TABLET ORAL at 14:47

## 2024-01-01 RX ADMIN — ATORVASTATIN CALCIUM 80 MG: 80 TABLET, FILM COATED ORAL at 15:51

## 2024-01-01 RX ADMIN — HEPARIN SODIUM 5000 UNITS: 5000 INJECTION INTRAVENOUS; SUBCUTANEOUS at 13:50

## 2024-01-01 RX ADMIN — VASOPRESSIN 0.04 UNITS/MIN: 20 INJECTION INTRAVENOUS at 17:07

## 2024-01-01 RX ADMIN — ACETAMINOPHEN 650 MG: 325 TABLET, FILM COATED ORAL at 13:50

## 2024-01-01 RX ADMIN — PRAMIPEXOLE DIHYDROCHLORIDE 1 MG: 1 TABLET ORAL at 21:41

## 2024-01-01 RX ADMIN — HYDROMORPHONE HYDROCHLORIDE 0.5 MG: 1 INJECTION, SOLUTION INTRAMUSCULAR; INTRAVENOUS; SUBCUTANEOUS at 19:43

## 2024-01-01 RX ADMIN — ALBUMIN (HUMAN) 12.5 G: 12.5 INJECTION, SOLUTION INTRAVENOUS at 21:27

## 2024-01-01 RX ADMIN — BUMETANIDE 4 MG: 0.25 INJECTION INTRAMUSCULAR; INTRAVENOUS at 12:34

## 2024-01-01 RX ADMIN — NOREPINEPHRINE BITARTRATE 5 MCG/MIN: 1 SOLUTION INTRAVENOUS at 15:33

## 2024-01-01 RX ADMIN — SODIUM CHLORIDE SOLN NEBU 3% 4 ML: 3 NEBU SOLN at 07:22

## 2024-01-01 RX ADMIN — ASPIRIN 325 MG ORAL TABLET 325 MG: 325 PILL ORAL at 08:23

## 2024-01-01 RX ADMIN — PRAMIPEXOLE DIHYDROCHLORIDE 1 MG: 1 TABLET ORAL at 21:00

## 2024-01-01 RX ADMIN — Medication 2 MG/HR: at 20:15

## 2024-01-01 RX ADMIN — POTASSIUM CHLORIDE 20 MEQ: 1500 TABLET, EXTENDED RELEASE ORAL at 13:29

## 2024-01-01 RX ADMIN — Medication 2 MG/HR: at 08:00

## 2024-01-01 RX ADMIN — GABAPENTIN 800 MG: 400 CAPSULE ORAL at 08:33

## 2024-01-01 RX ADMIN — MILRINONE LACTATE IN DEXTROSE 0.25 MCG/KG/MIN: 200 INJECTION, SOLUTION INTRAVENOUS at 01:35

## 2024-01-01 RX ADMIN — PRAMIPEXOLE DIHYDROCHLORIDE 1 MG: 1 TABLET ORAL at 21:02

## 2024-01-01 RX ADMIN — INSULIN LISPRO 1 UNITS: 100 INJECTION, SOLUTION INTRAVENOUS; SUBCUTANEOUS at 17:39

## 2024-01-01 RX ADMIN — SODIUM CHLORIDE SOLN NEBU 3% 4 ML: 3 NEBU SOLN at 20:15

## 2024-01-01 RX ADMIN — AMINOCAPROIC ACID 1 G/HR: 250 INJECTION, SOLUTION INTRAVENOUS at 11:54

## 2024-01-01 RX ADMIN — PANTOPRAZOLE SODIUM 40 MG: 40 TABLET, DELAYED RELEASE ORAL at 06:14

## 2024-01-01 RX ADMIN — HEPARIN SODIUM 5000 UNITS: 5000 INJECTION INTRAVENOUS; SUBCUTANEOUS at 21:41

## 2024-01-01 RX ADMIN — OXYCODONE HYDROCHLORIDE AND ACETAMINOPHEN 500 MG: 500 TABLET ORAL at 08:23

## 2024-01-01 RX ADMIN — CHLORHEXIDINE GLUCONATE 15 ML: 1.2 SOLUTION ORAL at 09:23

## 2024-01-01 RX ADMIN — METOPROLOL TARTRATE 25 MG: 25 TABLET, FILM COATED ORAL at 22:05

## 2024-01-01 RX ADMIN — AMIODARONE HYDROCHLORIDE 200 MG: 200 TABLET ORAL at 21:39

## 2024-01-01 RX ADMIN — FERROUS SULFATE TAB 325 MG (65 MG ELEMENTAL FE) 325 MG: 325 (65 FE) TAB at 09:30

## 2024-01-01 RX ADMIN — ATORVASTATIN CALCIUM 80 MG: 80 TABLET, FILM COATED ORAL at 17:16

## 2024-01-01 RX ADMIN — LEVALBUTEROL HYDROCHLORIDE 1.25 MG: 1.25 SOLUTION RESPIRATORY (INHALATION) at 07:28

## 2024-01-01 RX ADMIN — LEVALBUTEROL HYDROCHLORIDE 1.25 MG: 1.25 SOLUTION RESPIRATORY (INHALATION) at 20:52

## 2024-01-01 RX ADMIN — POTASSIUM CHLORIDE 40 MEQ: 29.8 INJECTION, SOLUTION INTRAVENOUS at 12:12

## 2024-01-01 RX ADMIN — BUMETANIDE 1 MG: 0.25 INJECTION INTRAMUSCULAR; INTRAVENOUS at 21:35

## 2024-01-01 RX ADMIN — OXYCODONE HYDROCHLORIDE AND ACETAMINOPHEN 500 MG: 500 TABLET ORAL at 18:21

## 2024-01-01 RX ADMIN — Medication 1 MG/HR: at 10:09

## 2024-01-01 RX ADMIN — CHLORHEXIDINE GLUCONATE 15 ML: 1.2 SOLUTION ORAL at 08:08

## 2024-01-01 RX ADMIN — LEVALBUTEROL HYDROCHLORIDE 1.25 MG: 1.25 SOLUTION RESPIRATORY (INHALATION) at 19:29

## 2024-01-01 RX ADMIN — ACETAMINOPHEN 650 MG: 325 TABLET, FILM COATED ORAL at 08:56

## 2024-01-01 RX ADMIN — OXYCODONE HYDROCHLORIDE AND ACETAMINOPHEN 500 MG: 500 TABLET ORAL at 17:39

## 2024-01-01 RX ADMIN — FUROSEMIDE 40 MG: 10 INJECTION, SOLUTION INTRAMUSCULAR; INTRAVENOUS at 08:23

## 2024-01-01 RX ADMIN — MUPIROCIN 1 APPLICATION: 20 OINTMENT TOPICAL at 20:35

## 2024-01-01 RX ADMIN — PRAMIPEXOLE DIHYDROCHLORIDE 1 MG: 1 TABLET ORAL at 21:19

## 2024-01-01 RX ADMIN — ASPIRIN 81 MG: 81 TABLET, COATED ORAL at 09:23

## 2024-01-01 RX ADMIN — WARFARIN SODIUM 1 MG: 1 TABLET ORAL at 17:48

## 2024-01-01 RX ADMIN — WARFARIN SODIUM 2.5 MG: 2.5 TABLET ORAL at 14:25

## 2024-01-01 RX ADMIN — BUMETANIDE 1 MG: 0.25 INJECTION INTRAMUSCULAR; INTRAVENOUS at 18:11

## 2024-01-01 RX ADMIN — FERROUS SULFATE TAB 325 MG (65 MG ELEMENTAL FE) 325 MG: 325 (65 FE) TAB at 08:27

## 2024-01-01 RX ADMIN — POTASSIUM CHLORIDE 20 MEQ: 1500 TABLET, EXTENDED RELEASE ORAL at 10:41

## 2024-01-01 RX ADMIN — WARFARIN SODIUM 2.5 MG: 2.5 TABLET ORAL at 18:21

## 2024-01-01 RX ADMIN — LEVALBUTEROL HYDROCHLORIDE 1.25 MG: 1.25 SOLUTION RESPIRATORY (INHALATION) at 13:06

## 2024-01-01 RX ADMIN — HEPARIN SODIUM 5000 UNITS: 5000 INJECTION INTRAVENOUS; SUBCUTANEOUS at 05:36

## 2024-01-01 RX ADMIN — AMIODARONE HYDROCHLORIDE 200 MG: 200 TABLET ORAL at 17:16

## 2024-01-01 RX ADMIN — PANTOPRAZOLE SODIUM 40 MG: 40 TABLET, DELAYED RELEASE ORAL at 06:18

## 2024-01-01 RX ADMIN — LEVALBUTEROL HYDROCHLORIDE 1.25 MG: 1.25 SOLUTION RESPIRATORY (INHALATION) at 22:31

## 2024-01-01 RX ADMIN — ATORVASTATIN CALCIUM 80 MG: 80 TABLET, FILM COATED ORAL at 17:37

## 2024-01-01 RX ADMIN — Medication 4 MG/HR: at 13:27

## 2024-01-01 RX ADMIN — LEVALBUTEROL HYDROCHLORIDE 1.25 MG: 1.25 SOLUTION RESPIRATORY (INHALATION) at 19:49

## 2024-01-01 RX ADMIN — HEPARIN SODIUM 5000 UNITS: 5000 INJECTION INTRAVENOUS; SUBCUTANEOUS at 21:26

## 2024-01-01 RX ADMIN — POLYETHYLENE GLYCOL 3350 17 G: 17 POWDER, FOR SOLUTION ORAL at 08:27

## 2024-01-01 RX ADMIN — Medication 2 MG/HR: at 04:58

## 2024-01-01 RX ADMIN — AMIODARONE HYDROCHLORIDE 200 MG: 200 TABLET ORAL at 06:20

## 2024-01-01 RX ADMIN — AMIODARONE HYDROCHLORIDE 200 MG: 200 TABLET ORAL at 15:51

## 2024-01-01 RX ADMIN — NOREPINEPHRINE BITARTRATE 12 MCG/MIN: 1 SOLUTION INTRAVENOUS at 07:39

## 2024-01-01 RX ADMIN — LEVALBUTEROL HYDROCHLORIDE 1.25 MG: 1.25 SOLUTION RESPIRATORY (INHALATION) at 01:44

## 2024-01-01 RX ADMIN — WARFARIN SODIUM 5 MG: 5 TABLET ORAL at 17:54

## 2024-01-01 RX ADMIN — AMIODARONE HYDROCHLORIDE 200 MG: 200 TABLET ORAL at 06:10

## 2024-01-01 RX ADMIN — METOLAZONE 5 MG: 5 TABLET ORAL at 21:41

## 2024-01-01 RX ADMIN — Medication 2 MG/HR: at 17:33

## 2024-01-01 RX ADMIN — ATORVASTATIN CALCIUM 80 MG: 80 TABLET, FILM COATED ORAL at 16:04

## 2024-01-01 RX ADMIN — ACETAMINOPHEN 650 MG: 325 TABLET, FILM COATED ORAL at 08:27

## 2024-01-01 RX ADMIN — INSULIN LISPRO 1 UNITS: 100 INJECTION, SOLUTION INTRAVENOUS; SUBCUTANEOUS at 06:26

## 2024-01-01 RX ADMIN — METOPROLOL TARTRATE 25 MG: 25 TABLET, FILM COATED ORAL at 09:01

## 2024-01-01 RX ADMIN — METOLAZONE 5 MG: 5 TABLET ORAL at 06:20

## 2024-01-01 RX ADMIN — METOLAZONE 10 MG: 10 TABLET ORAL at 14:36

## 2024-01-01 RX ADMIN — ASPIRIN 325 MG ORAL TABLET 325 MG: 325 PILL ORAL at 08:54

## 2024-01-01 RX ADMIN — ACETAMINOPHEN 650 MG: 325 TABLET, FILM COATED ORAL at 08:54

## 2024-01-01 RX ADMIN — METOLAZONE 5 MG: 5 TABLET ORAL at 05:47

## 2024-01-01 RX ADMIN — HEPARIN SODIUM 5000 UNITS: 5000 INJECTION INTRAVENOUS; SUBCUTANEOUS at 14:24

## 2024-01-01 RX ADMIN — AMIODARONE HYDROCHLORIDE 200 MG: 200 TABLET ORAL at 05:59

## 2024-01-01 RX ADMIN — AMIODARONE HYDROCHLORIDE 200 MG: 200 TABLET ORAL at 13:50

## 2024-01-01 RX ADMIN — LEVALBUTEROL HYDROCHLORIDE 1.25 MG: 1.25 SOLUTION RESPIRATORY (INHALATION) at 20:04

## 2024-01-01 RX ADMIN — ACETAMINOPHEN 650 MG: 325 TABLET, FILM COATED ORAL at 21:03

## 2024-01-01 RX ADMIN — CHLORHEXIDINE GLUCONATE 15 ML: 1.2 SOLUTION ORAL at 17:39

## 2024-01-01 RX ADMIN — SENNOSIDES, DOCUSATE SODIUM 1 TABLET: 8.6; 5 TABLET ORAL at 17:54

## 2024-01-01 RX ADMIN — ALBUMIN (HUMAN) 25 G: 0.25 INJECTION, SOLUTION INTRAVENOUS at 06:05

## 2024-01-01 RX ADMIN — ATORVASTATIN CALCIUM 80 MG: 80 TABLET, FILM COATED ORAL at 17:08

## 2024-01-01 RX ADMIN — LEVALBUTEROL HYDROCHLORIDE 1.25 MG: 1.25 SOLUTION RESPIRATORY (INHALATION) at 07:10

## 2024-01-01 RX ADMIN — CHLORHEXIDINE GLUCONATE 15 ML: 1.2 SOLUTION ORAL at 08:33

## 2024-01-01 RX ADMIN — AMIODARONE HYDROCHLORIDE 150 MG: 50 INJECTION, SOLUTION INTRAVENOUS at 09:33

## 2024-01-01 RX ADMIN — POTASSIUM CHLORIDE 40 MEQ: 29.8 INJECTION, SOLUTION INTRAVENOUS at 06:23

## 2024-01-01 RX ADMIN — ACETAMINOPHEN 650 MG: 325 TABLET, FILM COATED ORAL at 20:59

## 2024-01-01 RX ADMIN — BUMETANIDE 4 MG: 0.25 INJECTION INTRAMUSCULAR; INTRAVENOUS at 10:11

## 2024-01-01 RX ADMIN — GABAPENTIN 800 MG: 400 CAPSULE ORAL at 08:23

## 2024-01-01 RX ADMIN — METOLAZONE 5 MG: 5 TABLET ORAL at 23:09

## 2024-01-01 RX ADMIN — PANTOPRAZOLE SODIUM 40 MG: 40 TABLET, DELAYED RELEASE ORAL at 05:53

## 2024-01-01 RX ADMIN — METOLAZONE 10 MG: 10 TABLET ORAL at 05:59

## 2024-01-01 RX ADMIN — ALBUMIN (HUMAN) 25 G: 0.25 INJECTION, SOLUTION INTRAVENOUS at 18:22

## 2024-01-01 RX ADMIN — PANTOPRAZOLE SODIUM 40 MG: 40 TABLET, DELAYED RELEASE ORAL at 06:52

## 2024-01-01 RX ADMIN — ASPIRIN 81 MG: 81 TABLET, COATED ORAL at 08:27

## 2024-01-01 RX ADMIN — PANTOPRAZOLE SODIUM 40 MG: 40 TABLET, DELAYED RELEASE ORAL at 05:46

## 2024-01-01 RX ADMIN — CEFAZOLIN SODIUM 2000 MG: 2 SOLUTION INTRAVENOUS at 10:14

## 2024-01-01 RX ADMIN — Medication 12.5 MG: at 06:49

## 2024-01-01 RX ADMIN — ACETAMINOPHEN 650 MG: 325 TABLET, FILM COATED ORAL at 08:21

## 2024-01-01 RX ADMIN — ASPIRIN 81 MG: 81 TABLET, COATED ORAL at 08:02

## 2024-01-01 RX ADMIN — ACETAMINOPHEN 650 MG: 325 TABLET, FILM COATED ORAL at 13:29

## 2024-01-01 RX ADMIN — Medication 4 MG/HR: at 10:31

## 2024-01-01 RX ADMIN — CHLORHEXIDINE GLUCONATE 15 ML: 1.2 SOLUTION ORAL at 18:21

## 2024-01-01 RX ADMIN — PANTOPRAZOLE SODIUM 40 MG: 40 TABLET, DELAYED RELEASE ORAL at 06:36

## 2024-01-01 RX ADMIN — AMIODARONE HYDROCHLORIDE 200 MG: 200 TABLET ORAL at 05:35

## 2024-01-01 RX ADMIN — OXYCODONE HYDROCHLORIDE AND ACETAMINOPHEN 500 MG: 500 TABLET ORAL at 08:08

## 2024-01-01 RX ADMIN — AMIODARONE HYDROCHLORIDE 200 MG: 200 TABLET ORAL at 21:41

## 2024-01-01 RX ADMIN — HEPARIN SODIUM 5000 UNITS: 5000 INJECTION INTRAVENOUS; SUBCUTANEOUS at 14:03

## 2024-01-01 RX ADMIN — ACETAMINOPHEN 650 MG: 325 TABLET, FILM COATED ORAL at 14:02

## 2024-01-01 RX ADMIN — ALBUMIN (HUMAN) 12.5 G: 12.5 INJECTION, SOLUTION INTRAVENOUS at 01:32

## 2024-01-01 RX ADMIN — INSULIN LISPRO 1 UNITS: 100 INJECTION, SOLUTION INTRAVENOUS; SUBCUTANEOUS at 21:41

## 2024-01-01 RX ADMIN — AMIODARONE HYDROCHLORIDE 1 MG/MIN: 50 INJECTION, SOLUTION INTRAVENOUS at 03:22

## 2024-01-01 RX ADMIN — Medication 2 MG/HR: at 21:41

## 2024-01-01 RX ADMIN — FUROSEMIDE 80 MG: 10 INJECTION, SOLUTION INTRAMUSCULAR; INTRAVENOUS at 10:12

## 2024-01-01 RX ADMIN — Medication 2 MG/HR: at 23:19

## 2024-01-01 RX ADMIN — NYSTATIN: 100000 POWDER TOPICAL at 08:06

## 2024-01-01 RX ADMIN — OXYCODONE HYDROCHLORIDE AND ACETAMINOPHEN 500 MG: 500 TABLET ORAL at 17:08

## 2024-01-01 RX ADMIN — LEVALBUTEROL HYDROCHLORIDE 1.25 MG: 1.25 SOLUTION RESPIRATORY (INHALATION) at 07:46

## 2024-01-01 RX ADMIN — METOPROLOL TARTRATE 50 MG: 50 TABLET, FILM COATED ORAL at 21:19

## 2024-01-01 RX ADMIN — CHLORHEXIDINE GLUCONATE 15 ML: 1.2 SOLUTION ORAL at 09:31

## 2024-01-01 RX ADMIN — METOPROLOL TARTRATE 25 MG: 25 TABLET, FILM COATED ORAL at 09:23

## 2024-01-01 RX ADMIN — ASPIRIN 81 MG: 81 TABLET, COATED ORAL at 08:55

## 2024-01-01 RX ADMIN — GABAPENTIN 200 MG: 100 CAPSULE ORAL at 17:16

## 2024-01-01 RX ADMIN — GABAPENTIN 800 MG: 400 CAPSULE ORAL at 15:54

## 2024-01-01 RX ADMIN — ASPIRIN 325 MG ORAL TABLET 325 MG: 325 PILL ORAL at 08:57

## 2024-01-01 RX ADMIN — Medication 2 MG/HR: at 08:57

## 2024-01-01 RX ADMIN — AMIODARONE HYDROCHLORIDE 1 MG/MIN: 50 INJECTION, SOLUTION INTRAVENOUS at 04:11

## 2024-01-01 RX ADMIN — WARFARIN SODIUM 2.5 MG: 2.5 TABLET ORAL at 20:41

## 2024-01-01 RX ADMIN — ACETAMINOPHEN 650 MG: 325 TABLET, FILM COATED ORAL at 20:38

## 2024-01-01 RX ADMIN — SODIUM CHLORIDE 20 ML/HR: 0.45 INJECTION, SOLUTION INTRAVENOUS at 11:36

## 2024-01-01 RX ADMIN — CHLORHEXIDINE GLUCONATE 15 ML: 1.2 SOLUTION ORAL at 21:39

## 2024-01-01 RX ADMIN — ASPIRIN 81 MG: 81 TABLET, COATED ORAL at 08:39

## 2024-01-01 RX ADMIN — Medication 12.5 MG: at 08:08

## 2024-01-01 RX ADMIN — POTASSIUM CHLORIDE 20 MEQ: 1500 TABLET, EXTENDED RELEASE ORAL at 08:27

## 2024-01-01 RX ADMIN — MUPIROCIN 1 APPLICATION: 20 OINTMENT TOPICAL at 08:23

## 2024-01-01 RX ADMIN — BUMETANIDE 1 MG: 0.25 INJECTION INTRAMUSCULAR; INTRAVENOUS at 08:22

## 2024-01-01 RX ADMIN — ASPIRIN 81 MG: 81 TABLET, COATED ORAL at 11:55

## 2024-01-01 RX ADMIN — ATORVASTATIN CALCIUM 80 MG: 80 TABLET, FILM COATED ORAL at 17:43

## 2024-01-01 RX ADMIN — NYSTATIN: 100000 POWDER TOPICAL at 17:09

## 2024-01-01 RX ADMIN — Medication 2 MG/HR: at 09:43

## 2024-01-01 RX ADMIN — LIDOCAINE HYDROCHLORIDE 10 ML: 10 INJECTION, SOLUTION EPIDURAL; INFILTRATION; INTRACAUDAL; PERINEURAL at 15:38

## 2024-01-01 RX ADMIN — HEPARIN SODIUM 5000 UNITS: 5000 INJECTION INTRAVENOUS; SUBCUTANEOUS at 21:00

## 2024-01-01 RX ADMIN — SODIUM CHLORIDE 50 ML/HR: 0.9 INJECTION, SOLUTION INTRAVENOUS at 10:06

## 2024-01-01 RX ADMIN — DIGOXIN 250 MCG: 0.25 INJECTION INTRAMUSCULAR; INTRAVENOUS at 13:18

## 2024-01-01 RX ADMIN — CHLOROTHIAZIDE SODIUM 500 MG: 500 INJECTION, POWDER, LYOPHILIZED, FOR SOLUTION INTRAVENOUS at 12:22

## 2024-01-01 RX ADMIN — POTASSIUM CHLORIDE 40 MEQ: 1500 TABLET, EXTENDED RELEASE ORAL at 10:41

## 2024-01-01 RX ADMIN — ACETAMINOPHEN 650 MG: 325 TABLET, FILM COATED ORAL at 21:02

## 2024-01-01 RX ADMIN — SODIUM CHLORIDE SOLN NEBU 3% 4 ML: 3 NEBU SOLN at 13:06

## 2024-01-01 RX ADMIN — INSULIN LISPRO 1 UNITS: 100 INJECTION, SOLUTION INTRAVENOUS; SUBCUTANEOUS at 16:24

## 2024-01-01 RX ADMIN — CHLORHEXIDINE GLUCONATE 15 ML: 1.2 SOLUTION ORAL at 17:23

## 2024-01-01 RX ADMIN — MUPIROCIN 1 APPLICATION: 20 OINTMENT TOPICAL at 06:49

## 2024-01-01 RX ADMIN — SODIUM CHLORIDE SOLN NEBU 3% 4 ML: 3 NEBU SOLN at 20:16

## 2024-01-01 RX ADMIN — BUMETANIDE 2 MG: 0.25 INJECTION INTRAMUSCULAR; INTRAVENOUS at 08:39

## 2024-01-01 RX ADMIN — PROTAMINE SULFATE 50 MG: 10 INJECTION, SOLUTION INTRAVENOUS at 16:05

## 2024-01-01 RX ADMIN — CALCIUM CHLORIDE 1 G: 100 INJECTION INTRAVENOUS; INTRAVENTRICULAR at 10:24

## 2024-01-01 RX ADMIN — HYDROMORPHONE HYDROCHLORIDE 0.5 MG: 1 INJECTION, SOLUTION INTRAMUSCULAR; INTRAVENOUS; SUBCUTANEOUS at 02:52

## 2024-01-01 RX ADMIN — POTASSIUM CHLORIDE 20 MEQ: 1500 TABLET, EXTENDED RELEASE ORAL at 17:16

## 2024-01-01 RX ADMIN — ACETAMINOPHEN 650 MG: 325 TABLET, FILM COATED ORAL at 08:33

## 2024-01-01 RX ADMIN — ACETAMINOPHEN 650 MG: 325 TABLET, FILM COATED ORAL at 14:25

## 2024-01-01 RX ADMIN — ATORVASTATIN CALCIUM 80 MG: 80 TABLET, FILM COATED ORAL at 16:21

## 2024-01-01 RX ADMIN — AMIODARONE HYDROCHLORIDE 200 MG: 200 TABLET ORAL at 20:43

## 2024-01-01 RX ADMIN — AMIODARONE HYDROCHLORIDE 1 MG/MIN: 50 INJECTION, SOLUTION INTRAVENOUS at 11:29

## 2024-01-01 RX ADMIN — Medication 4 MG/HR: at 07:01

## 2024-01-01 RX ADMIN — WARFARIN SODIUM 2.5 MG: 2.5 TABLET ORAL at 18:27

## 2024-01-01 RX ADMIN — HEPARIN SODIUM 5000 UNITS: 5000 INJECTION INTRAVENOUS; SUBCUTANEOUS at 06:18

## 2024-01-01 RX ADMIN — AMIODARONE HYDROCHLORIDE 200 MG: 200 TABLET ORAL at 14:02

## 2024-01-01 RX ADMIN — METOPROLOL TARTRATE 50 MG: 50 TABLET, FILM COATED ORAL at 08:54

## 2024-01-01 RX ADMIN — AMIODARONE HYDROCHLORIDE 200 MG: 200 TABLET ORAL at 05:09

## 2024-01-01 RX ADMIN — AMIODARONE HYDROCHLORIDE 200 MG: 200 TABLET ORAL at 13:23

## 2024-01-01 RX ADMIN — NOREPINEPHRINE BITARTRATE 2 MCG/MIN: 1 SOLUTION INTRAVENOUS at 09:50

## 2024-01-01 RX ADMIN — SODIUM CHLORIDE SOLN NEBU 3% 4 ML: 3 NEBU SOLN at 08:48

## 2024-01-01 RX ADMIN — DEXMEDETOMIDINE HYDROCHLORIDE 0.2 MCG/KG/HR: 4 INJECTION, SOLUTION INTRAVENOUS at 20:44

## 2024-01-01 RX ADMIN — ALBUMIN (HUMAN) 25 G: 0.25 INJECTION, SOLUTION INTRAVENOUS at 14:02

## 2024-01-01 RX ADMIN — SODIUM CHLORIDE SOLN NEBU 3% 4 ML: 3 NEBU SOLN at 07:51

## 2024-01-01 RX ADMIN — CHLORHEXIDINE GLUCONATE 15 ML: 1.2 SOLUTION ORAL at 21:02

## 2024-01-01 RX ADMIN — Medication 2 MG/HR: at 03:17

## 2024-01-01 RX ADMIN — WARFARIN SODIUM 5 MG: 5 TABLET ORAL at 17:43

## 2024-01-01 RX ADMIN — MAGNESIUM SULFATE HEPTAHYDRATE 2 G: 40 INJECTION, SOLUTION INTRAVENOUS at 11:36

## 2024-01-01 RX ADMIN — PANTOPRAZOLE SODIUM 40 MG: 40 TABLET, DELAYED RELEASE ORAL at 06:20

## 2024-01-01 RX ADMIN — Medication 2 MG/HR: at 14:34

## 2024-01-01 RX ADMIN — FERROUS SULFATE TAB 325 MG (65 MG ELEMENTAL FE) 325 MG: 325 (65 FE) TAB at 09:52

## 2024-01-01 RX ADMIN — INSULIN LISPRO 1 UNITS: 100 INJECTION, SOLUTION INTRAVENOUS; SUBCUTANEOUS at 15:55

## 2024-01-01 RX ADMIN — AMIODARONE HYDROCHLORIDE 200 MG: 200 TABLET ORAL at 21:26

## 2024-01-01 RX ADMIN — AMIODARONE HYDROCHLORIDE 200 MG: 200 TABLET ORAL at 14:03

## 2024-01-01 RX ADMIN — Medication 1 MG/HR: at 17:38

## 2024-01-01 RX ADMIN — CHLORHEXIDINE GLUCONATE 15 ML: 1.2 SOLUTION ORAL at 20:23

## 2024-01-01 RX ADMIN — Medication 2 MG/HR: at 13:23

## 2024-01-01 RX ADMIN — METOLAZONE 5 MG: 5 TABLET ORAL at 06:36

## 2024-01-01 RX ADMIN — AMIODARONE HYDROCHLORIDE 200 MG: 200 TABLET ORAL at 13:18

## 2024-01-01 RX ADMIN — LEVALBUTEROL HYDROCHLORIDE 1.25 MG: 1.25 SOLUTION RESPIRATORY (INHALATION) at 19:22

## 2024-01-01 RX ADMIN — ACETAMINOPHEN 650 MG: 325 TABLET, FILM COATED ORAL at 06:10

## 2024-01-01 RX ADMIN — GABAPENTIN 200 MG: 100 CAPSULE ORAL at 08:27

## 2024-01-01 RX ADMIN — ACETAMINOPHEN 650 MG: 325 TABLET, FILM COATED ORAL at 21:00

## 2024-01-01 RX ADMIN — PRAMIPEXOLE DIHYDROCHLORIDE 1 MG: 1 TABLET ORAL at 21:26

## 2024-01-01 RX ADMIN — METOPROLOL TARTRATE 25 MG: 25 TABLET, FILM COATED ORAL at 08:05

## 2024-01-01 RX ADMIN — CHLORHEXIDINE GLUCONATE 15 ML: 1.2 SOLUTION ORAL at 18:27

## 2024-01-01 RX ADMIN — FENTANYL CITRATE 100 MCG: 50 INJECTION INTRAMUSCULAR; INTRAVENOUS at 09:30

## 2024-01-01 RX ADMIN — POTASSIUM CHLORIDE 20 MEQ: 1500 TABLET, EXTENDED RELEASE ORAL at 07:28

## 2024-01-01 RX ADMIN — SODIUM CHLORIDE SOLN NEBU 3% 4 ML: 3 NEBU SOLN at 20:04

## 2024-01-01 RX ADMIN — GABAPENTIN 800 MG: 400 CAPSULE ORAL at 08:09

## 2024-01-01 RX ADMIN — NYSTATIN: 100000 POWDER TOPICAL at 12:03

## 2024-01-01 RX ADMIN — POTASSIUM CHLORIDE 20 MEQ: 14.9 INJECTION, SOLUTION INTRAVENOUS at 17:29

## 2024-01-01 RX ADMIN — METOPROLOL TARTRATE 25 MG: 25 TABLET, FILM COATED ORAL at 21:00

## 2024-01-01 RX ADMIN — Medication 2 MG/HR: at 18:32

## 2024-01-01 RX ADMIN — HYDROMORPHONE HYDROCHLORIDE 0.5 MG: 1 INJECTION, SOLUTION INTRAMUSCULAR; INTRAVENOUS; SUBCUTANEOUS at 05:28

## 2024-01-01 RX ADMIN — FERROUS SULFATE TAB 325 MG (65 MG ELEMENTAL FE) 325 MG: 325 (65 FE) TAB at 08:03

## 2024-01-01 RX ADMIN — INSULIN LISPRO 1 UNITS: 100 INJECTION, SOLUTION INTRAVENOUS; SUBCUTANEOUS at 06:00

## 2024-01-01 RX ADMIN — INSULIN LISPRO 1 UNITS: 100 INJECTION, SOLUTION INTRAVENOUS; SUBCUTANEOUS at 00:10

## 2024-01-01 RX ADMIN — MILRINONE LACTATE IN DEXTROSE 0.13 MCG/KG/MIN: 200 INJECTION, SOLUTION INTRAVENOUS at 13:19

## 2024-01-01 RX ADMIN — SODIUM CHLORIDE SOLN NEBU 3% 4 ML: 3 NEBU SOLN at 19:43

## 2024-01-01 RX ADMIN — SENNOSIDES, DOCUSATE SODIUM 1 TABLET: 8.6; 5 TABLET ORAL at 08:39

## 2024-01-01 RX ADMIN — ONDANSETRON 4 MG: 2 INJECTION INTRAMUSCULAR; INTRAVENOUS at 11:04

## 2024-01-01 RX ADMIN — INSULIN LISPRO 1 UNITS: 100 INJECTION, SOLUTION INTRAVENOUS; SUBCUTANEOUS at 12:08

## 2024-01-01 RX ADMIN — GABAPENTIN 800 MG: 400 CAPSULE ORAL at 08:54

## 2024-01-01 RX ADMIN — AMIODARONE HYDROCHLORIDE 200 MG: 200 TABLET ORAL at 14:24

## 2024-01-01 RX ADMIN — ASPIRIN 81 MG: 81 TABLET, COATED ORAL at 09:00

## 2024-01-01 RX ADMIN — HEPARIN SODIUM 5000 UNITS: 5000 INJECTION INTRAVENOUS; SUBCUTANEOUS at 15:51

## 2024-01-01 RX ADMIN — VASOPRESSIN 0.04 UNITS/MIN: 20 INJECTION INTRAVENOUS at 22:31

## 2024-01-01 RX ADMIN — HYDROMORPHONE HYDROCHLORIDE 0.5 MG: 1 INJECTION, SOLUTION INTRAMUSCULAR; INTRAVENOUS; SUBCUTANEOUS at 06:21

## 2024-01-01 RX ADMIN — ONDANSETRON 4 MG: 2 INJECTION INTRAMUSCULAR; INTRAVENOUS at 02:07

## 2024-01-01 RX ADMIN — HEPARIN SODIUM 5000 UNITS: 5000 INJECTION INTRAVENOUS; SUBCUTANEOUS at 21:18

## 2024-01-01 RX ADMIN — ACETAMINOPHEN 650 MG: 325 TABLET, FILM COATED ORAL at 07:28

## 2024-01-01 RX ADMIN — Medication 2 MG/HR: at 21:59

## 2024-01-01 RX ADMIN — AMIODARONE HYDROCHLORIDE 200 MG: 200 TABLET ORAL at 21:19

## 2024-01-01 RX ADMIN — AMIODARONE HYDROCHLORIDE 1 MG/MIN: 50 INJECTION, SOLUTION INTRAVENOUS at 11:27

## 2024-01-01 RX ADMIN — MILRINONE LACTATE IN DEXTROSE 0.25 MCG/KG/MIN: 200 INJECTION, SOLUTION INTRAVENOUS at 12:55

## 2024-01-01 RX ADMIN — Medication 2 MG/HR: at 04:43

## 2024-01-01 RX ADMIN — GABAPENTIN 200 MG: 100 CAPSULE ORAL at 08:39

## 2024-01-01 RX ADMIN — METOPROLOL TARTRATE 25 MG: 25 TABLET, FILM COATED ORAL at 20:15

## 2024-01-01 RX ADMIN — VASOPRESSIN 0.08 UNITS/MIN: 20 INJECTION INTRAVENOUS at 09:45

## 2024-01-01 RX ADMIN — LEVALBUTEROL HYDROCHLORIDE 1.25 MG: 1.25 SOLUTION RESPIRATORY (INHALATION) at 19:43

## 2024-01-01 RX ADMIN — OXYCODONE HYDROCHLORIDE AND ACETAMINOPHEN 500 MG: 500 TABLET ORAL at 08:57

## 2024-01-01 RX ADMIN — PRAMIPEXOLE DIHYDROCHLORIDE 1 MG: 1 TABLET ORAL at 20:15

## 2024-01-01 RX ADMIN — SENNOSIDES, DOCUSATE SODIUM 1 TABLET: 8.6; 5 TABLET ORAL at 17:26

## 2024-01-01 RX ADMIN — SODIUM CHLORIDE SOLN NEBU 3% 4 ML: 3 NEBU SOLN at 07:55

## 2024-01-01 RX ADMIN — FENTANYL CITRATE 50 MCG: 50 INJECTION INTRAMUSCULAR; INTRAVENOUS at 21:10

## 2024-01-01 RX ADMIN — SODIUM CHLORIDE SOLN NEBU 3% 4 ML: 3 NEBU SOLN at 19:17

## 2024-01-01 RX ADMIN — AMIODARONE HYDROCHLORIDE 200 MG: 200 TABLET ORAL at 22:55

## 2024-01-01 RX ADMIN — AMIODARONE HYDROCHLORIDE 200 MG: 200 TABLET ORAL at 21:02

## 2024-01-01 RX ADMIN — CHLORHEXIDINE GLUCONATE 15 ML: 1.2 SOLUTION ORAL at 17:17

## 2024-01-01 RX ADMIN — METOPROLOL TARTRATE 25 MG: 25 TABLET, FILM COATED ORAL at 08:55

## 2024-01-01 RX ADMIN — GABAPENTIN 800 MG: 400 CAPSULE ORAL at 15:41

## 2024-01-01 RX ADMIN — LEVALBUTEROL HYDROCHLORIDE 1.25 MG: 1.25 SOLUTION RESPIRATORY (INHALATION) at 08:48

## 2024-01-01 RX ADMIN — METOPROLOL TARTRATE 50 MG: 50 TABLET, FILM COATED ORAL at 08:57

## 2024-01-01 RX ADMIN — ACETAMINOPHEN 650 MG: 325 TABLET, FILM COATED ORAL at 14:35

## 2024-01-01 RX ADMIN — OXYCODONE HYDROCHLORIDE AND ACETAMINOPHEN 500 MG: 500 TABLET ORAL at 17:15

## 2024-01-01 RX ADMIN — FERROUS SULFATE TAB 325 MG (65 MG ELEMENTAL FE) 325 MG: 325 (65 FE) TAB at 08:06

## 2024-01-01 RX ADMIN — METOLAZONE 10 MG: 10 TABLET ORAL at 12:18

## 2024-01-01 RX ADMIN — NOREPINEPHRINE BITARTRATE 16 MCG/MIN: 1 SOLUTION INTRAVENOUS at 19:59

## 2024-01-01 RX ADMIN — PRAMIPEXOLE DIHYDROCHLORIDE 1 MG: 1 TABLET ORAL at 22:55

## 2024-01-01 RX ADMIN — GABAPENTIN 800 MG: 400 CAPSULE ORAL at 21:19

## 2024-01-01 RX ADMIN — Medication 2 MG/HR: at 10:42

## 2024-01-01 RX ADMIN — METOPROLOL TARTRATE 25 MG: 25 TABLET, FILM COATED ORAL at 08:02

## 2024-01-01 RX ADMIN — METOPROLOL TARTRATE 50 MG: 50 TABLET, FILM COATED ORAL at 09:30

## 2024-01-01 RX ADMIN — CHLORHEXIDINE GLUCONATE 15 ML: 1.2 SOLUTION ORAL at 17:08

## 2024-01-01 RX ADMIN — SODIUM CHLORIDE SOLN NEBU 3% 4 ML: 3 NEBU SOLN at 07:28

## 2024-01-01 RX ADMIN — Medication 2 MG/HR: at 13:30

## 2024-01-01 RX ADMIN — ATORVASTATIN CALCIUM 80 MG: 80 TABLET, FILM COATED ORAL at 17:17

## 2024-01-01 RX ADMIN — Medication 2 MG/HR: at 03:09

## 2024-01-01 RX ADMIN — SODIUM CHLORIDE SOLN NEBU 3% 4 ML: 3 NEBU SOLN at 19:20

## 2024-01-01 RX ADMIN — HYDROMORPHONE HYDROCHLORIDE 0.5 MG: 1 INJECTION, SOLUTION INTRAMUSCULAR; INTRAVENOUS; SUBCUTANEOUS at 03:06

## 2024-01-01 RX ADMIN — ACETAMINOPHEN 650 MG: 325 TABLET, FILM COATED ORAL at 08:03

## 2024-01-01 RX ADMIN — METOLAZONE 10 MG: 10 TABLET ORAL at 02:25

## 2024-01-01 RX ADMIN — LEVALBUTEROL HYDROCHLORIDE 1.25 MG: 1.25 SOLUTION RESPIRATORY (INHALATION) at 07:49

## 2024-01-01 RX ADMIN — SODIUM CHLORIDE, SODIUM LACTATE, POTASSIUM CHLORIDE, AND CALCIUM CHLORIDE 250 ML: .6; .31; .03; .02 INJECTION, SOLUTION INTRAVENOUS at 17:43

## 2024-01-01 RX ADMIN — HEPARIN SODIUM 5000 UNITS: 5000 INJECTION INTRAVENOUS; SUBCUTANEOUS at 05:09

## 2024-01-01 RX ADMIN — HYDROMORPHONE HYDROCHLORIDE 0.5 MG: 1 INJECTION, SOLUTION INTRAMUSCULAR; INTRAVENOUS; SUBCUTANEOUS at 09:04

## 2024-01-01 RX ADMIN — SODIUM CHLORIDE 3 UNITS/HR: 9 INJECTION, SOLUTION INTRAVENOUS at 07:40

## 2024-01-01 RX ADMIN — Medication 20 MG/HR: at 22:20

## 2024-01-01 RX ADMIN — OXYCODONE HYDROCHLORIDE 5 MG: 5 TABLET ORAL at 15:54

## 2024-01-01 RX ADMIN — Medication 2 MG/HR: at 03:14

## 2024-01-01 RX ADMIN — SODIUM CHLORIDE SOLN NEBU 3% 4 ML: 3 NEBU SOLN at 19:59

## 2024-01-01 RX ADMIN — Medication 40 MG/HR: at 05:53

## 2024-01-01 RX ADMIN — AMIODARONE HYDROCHLORIDE 1 MG/MIN: 50 INJECTION, SOLUTION INTRAVENOUS at 19:22

## 2024-01-01 RX ADMIN — POLYETHYLENE GLYCOL 3350 17 G: 17 POWDER, FOR SOLUTION ORAL at 09:30

## 2024-01-01 RX ADMIN — HEPARIN SODIUM 5000 UNITS: 5000 INJECTION INTRAVENOUS; SUBCUTANEOUS at 06:27

## 2024-01-01 RX ADMIN — HYDROMORPHONE HYDROCHLORIDE 0.5 MG: 1 INJECTION, SOLUTION INTRAMUSCULAR; INTRAVENOUS; SUBCUTANEOUS at 13:16

## 2024-01-01 RX ADMIN — CALCIUM GLUCONATE 2 G: 20 INJECTION, SOLUTION INTRAVENOUS at 06:13

## 2024-01-01 RX ADMIN — VASOPRESSIN 0.08 UNITS/MIN: 20 INJECTION INTRAVENOUS at 14:48

## 2024-01-01 RX ADMIN — BUMETANIDE 2 MG: 0.25 INJECTION INTRAMUSCULAR; INTRAVENOUS at 10:32

## 2024-01-01 RX ADMIN — ONDANSETRON 4 MG: 2 INJECTION INTRAMUSCULAR; INTRAVENOUS at 09:46

## 2024-01-01 RX ADMIN — POTASSIUM CHLORIDE 20 MEQ: 1500 TABLET, EXTENDED RELEASE ORAL at 17:54

## 2024-01-01 RX ADMIN — CALCIUM CHLORIDE 1 G: 100 INJECTION INTRAVENOUS; INTRAVENTRICULAR at 12:51

## 2024-01-01 RX ADMIN — HEPARIN SODIUM 5000 UNITS: 5000 INJECTION INTRAVENOUS; SUBCUTANEOUS at 05:21

## 2024-01-01 RX ADMIN — Medication 4 MG/HR: at 21:06

## 2024-01-01 RX ADMIN — HEPARIN SODIUM 5000 UNITS: 5000 INJECTION INTRAVENOUS; SUBCUTANEOUS at 21:28

## 2024-01-01 RX ADMIN — AMIODARONE HYDROCHLORIDE 200 MG: 200 TABLET ORAL at 05:47

## 2024-01-01 RX ADMIN — AMIODARONE HYDROCHLORIDE 150 MG: 50 INJECTION, SOLUTION INTRAVENOUS at 17:45

## 2024-01-01 RX ADMIN — HYDROMORPHONE HYDROCHLORIDE 0.5 MG: 1 INJECTION, SOLUTION INTRAMUSCULAR; INTRAVENOUS; SUBCUTANEOUS at 16:56

## 2024-01-01 RX ADMIN — AMIODARONE HYDROCHLORIDE 200 MG: 200 TABLET ORAL at 06:36

## 2024-01-01 RX ADMIN — POTASSIUM CHLORIDE 20 MEQ: 1500 TABLET, EXTENDED RELEASE ORAL at 17:08

## 2024-01-01 RX ADMIN — GABAPENTIN 200 MG: 100 CAPSULE ORAL at 20:26

## 2024-01-01 RX ADMIN — ASPIRIN 325 MG ORAL TABLET 325 MG: 325 PILL ORAL at 08:08

## 2024-01-01 RX ADMIN — POTASSIUM CHLORIDE 20 MEQ: 14.9 INJECTION, SOLUTION INTRAVENOUS at 23:29

## 2024-01-01 RX ADMIN — PRAMIPEXOLE DIHYDROCHLORIDE 1 MG: 1 TABLET ORAL at 23:51

## 2024-01-01 RX ADMIN — AMIODARONE HYDROCHLORIDE 200 MG: 200 TABLET ORAL at 15:54

## 2024-01-01 RX ADMIN — MUPIROCIN 1 APPLICATION: 20 OINTMENT TOPICAL at 08:56

## 2024-01-01 RX ADMIN — METOLAZONE 5 MG: 5 TABLET ORAL at 21:05

## 2024-01-01 RX ADMIN — AMIODARONE HYDROCHLORIDE 200 MG: 200 TABLET ORAL at 21:28

## 2024-01-01 RX ADMIN — ACETAMINOPHEN 650 MG: 325 TABLET, FILM COATED ORAL at 13:18

## 2024-01-01 RX ADMIN — BUMETANIDE 1 MG: 0.25 INJECTION INTRAMUSCULAR; INTRAVENOUS at 17:26

## 2024-01-01 RX ADMIN — DEXTROSE AND SODIUM CHLORIDE 50 ML/HR: 5; .9 INJECTION, SOLUTION INTRAVENOUS at 14:39

## 2024-01-01 RX ADMIN — Medication 2 MG/HR: at 20:59

## 2024-01-01 RX ADMIN — NYSTATIN: 100000 POWDER TOPICAL at 11:54

## 2024-01-01 RX ADMIN — GABAPENTIN 800 MG: 400 CAPSULE ORAL at 20:59

## 2024-01-01 RX ADMIN — PANTOPRAZOLE SODIUM 40 MG: 40 TABLET, DELAYED RELEASE ORAL at 05:58

## 2024-01-01 RX ADMIN — GABAPENTIN 200 MG: 100 CAPSULE ORAL at 17:43

## 2024-01-01 RX ADMIN — CEFAZOLIN SODIUM 2000 MG: 2 SOLUTION INTRAVENOUS at 01:31

## 2024-01-01 RX ADMIN — SODIUM CHLORIDE SOLN NEBU 3% 4 ML: 3 NEBU SOLN at 01:44

## 2024-01-01 RX ADMIN — WARFARIN SODIUM 1 MG: 1 TABLET ORAL at 17:37

## 2024-01-01 RX ADMIN — ACETAMINOPHEN 650 MG: 325 TABLET, FILM COATED ORAL at 15:54

## 2024-01-01 RX ADMIN — PANTOPRAZOLE SODIUM 40 MG: 40 TABLET, DELAYED RELEASE ORAL at 05:22

## 2024-01-01 RX ADMIN — CHLORHEXIDINE GLUCONATE 15 ML: 1.2 SOLUTION ORAL at 18:11

## 2024-01-01 RX ADMIN — CHLORHEXIDINE GLUCONATE 15 ML: 1.2 SOLUTION ORAL at 08:23

## 2024-01-01 RX ADMIN — SODIUM CHLORIDE SOLN NEBU 3% 4 ML: 3 NEBU SOLN at 20:52

## 2024-01-01 RX ADMIN — METOPROLOL TARTRATE 25 MG: 25 TABLET, FILM COATED ORAL at 21:39

## 2024-01-01 RX ADMIN — Medication 12.5 MG: at 20:42

## 2024-01-01 RX ADMIN — NOREPINEPHRINE BITARTRATE 16 MCG/MIN: 1 SOLUTION INTRAVENOUS at 11:41

## 2024-01-01 RX ADMIN — SODIUM CHLORIDE SOLN NEBU 3% 4 ML: 3 NEBU SOLN at 22:31

## 2024-01-01 RX ADMIN — HEPARIN SODIUM 5000 UNITS: 5000 INJECTION INTRAVENOUS; SUBCUTANEOUS at 21:17

## 2024-01-01 RX ADMIN — INSULIN LISPRO 1 UNITS: 100 INJECTION, SOLUTION INTRAVENOUS; SUBCUTANEOUS at 11:47

## 2024-01-01 RX ADMIN — FERROUS SULFATE TAB 325 MG (65 MG ELEMENTAL FE) 325 MG: 325 (65 FE) TAB at 08:55

## 2024-01-01 RX ADMIN — FENTANYL CITRATE 50 MCG: 50 INJECTION INTRAMUSCULAR; INTRAVENOUS at 20:39

## 2024-01-01 RX ADMIN — Medication 2 MG/HR: at 11:19

## 2024-01-01 RX ADMIN — BISACODYL 10 MG: 10 SUPPOSITORY RECTAL at 11:04

## 2024-01-01 RX ADMIN — LEVALBUTEROL HYDROCHLORIDE 1.25 MG: 1.25 SOLUTION RESPIRATORY (INHALATION) at 07:27

## 2024-01-01 RX ADMIN — METOLAZONE 5 MG: 5 TABLET ORAL at 15:30

## 2024-01-01 RX ADMIN — EPOETIN ALFA 5350 UNITS: 3000 SOLUTION INTRAVENOUS; SUBCUTANEOUS at 13:21

## 2024-01-01 RX ADMIN — SENNOSIDES, DOCUSATE SODIUM 1 TABLET: 8.6; 5 TABLET ORAL at 09:30

## 2024-01-01 RX ADMIN — FONDAPARINUX SODIUM 2.5 MG: 2.5 INJECTION, SOLUTION SUBCUTANEOUS at 08:23

## 2024-01-01 RX ADMIN — FERROUS SULFATE TAB 325 MG (65 MG ELEMENTAL FE) 325 MG: 325 (65 FE) TAB at 09:01

## 2024-01-01 RX ADMIN — Medication 2 MG/HR: at 02:15

## 2024-01-01 RX ADMIN — GABAPENTIN 200 MG: 100 CAPSULE ORAL at 17:59

## 2024-01-01 RX ADMIN — Medication 7.5 MG: at 17:16

## 2024-01-01 RX ADMIN — LEVALBUTEROL HYDROCHLORIDE 1.25 MG: 1.25 SOLUTION RESPIRATORY (INHALATION) at 19:06

## 2024-01-01 RX ADMIN — Medication 2 MG/HR: at 08:09

## 2024-01-01 RX ADMIN — ACETAMINOPHEN 650 MG: 325 TABLET, FILM COATED ORAL at 21:27

## 2024-01-01 RX ADMIN — ATORVASTATIN CALCIUM 80 MG: 80 TABLET, FILM COATED ORAL at 17:54

## 2024-01-01 RX ADMIN — PANTOPRAZOLE SODIUM 40 MG: 40 TABLET, DELAYED RELEASE ORAL at 05:47

## 2024-01-01 RX ADMIN — Medication 2 MG/HR: at 15:02

## 2024-01-01 RX ADMIN — ACETAMINOPHEN 650 MG: 325 TABLET, FILM COATED ORAL at 20:26

## 2024-01-01 RX ADMIN — HEPARIN SODIUM 5000 UNITS: 5000 INJECTION INTRAVENOUS; SUBCUTANEOUS at 15:54

## 2024-01-01 RX ADMIN — POTASSIUM CHLORIDE 40 MEQ: 1500 TABLET, EXTENDED RELEASE ORAL at 14:24

## 2024-01-01 RX ADMIN — METOPROLOL TARTRATE 25 MG: 25 TABLET, FILM COATED ORAL at 21:02

## 2024-01-01 RX ADMIN — OXYCODONE HYDROCHLORIDE AND ACETAMINOPHEN 500 MG: 500 TABLET ORAL at 18:27

## 2024-01-01 RX ADMIN — SODIUM CHLORIDE SOLN NEBU 3% 4 ML: 3 NEBU SOLN at 07:43

## 2024-01-01 RX ADMIN — AMIODARONE HYDROCHLORIDE 1 MG/MIN: 50 INJECTION, SOLUTION INTRAVENOUS at 04:13

## 2024-01-01 RX ADMIN — ACETAMINOPHEN 650 MG: 325 TABLET, FILM COATED ORAL at 08:55

## 2024-01-01 RX ADMIN — INSULIN LISPRO 1 UNITS: 100 INJECTION, SOLUTION INTRAVENOUS; SUBCUTANEOUS at 12:40

## 2024-01-01 RX ADMIN — Medication 2 MG/HR: at 19:35

## 2024-01-01 RX ADMIN — LEVALBUTEROL HYDROCHLORIDE 1.25 MG: 1.25 SOLUTION RESPIRATORY (INHALATION) at 07:43

## 2024-01-01 RX ADMIN — NYSTATIN: 100000 POWDER TOPICAL at 17:37

## 2024-01-01 RX ADMIN — AMIODARONE HYDROCHLORIDE 200 MG: 200 TABLET ORAL at 21:00

## 2024-01-01 RX ADMIN — AMIODARONE HYDROCHLORIDE 200 MG: 200 TABLET ORAL at 06:18

## 2024-01-01 RX ADMIN — PRAMIPEXOLE DIHYDROCHLORIDE 1 MG: 1 TABLET ORAL at 21:30

## 2024-01-01 RX ADMIN — Medication 2 MG/HR: at 13:40

## 2024-01-01 RX ADMIN — AMIODARONE HYDROCHLORIDE 200 MG: 200 TABLET ORAL at 21:30

## 2024-01-01 RX ADMIN — GABAPENTIN 800 MG: 400 CAPSULE ORAL at 16:21

## 2024-01-01 RX ADMIN — MUPIROCIN 1 APPLICATION: 20 OINTMENT TOPICAL at 08:33

## 2024-01-01 RX ADMIN — METOPROLOL TARTRATE 25 MG: 25 TABLET, FILM COATED ORAL at 21:41

## 2024-01-01 RX ADMIN — WARFARIN SODIUM 2.5 MG: 2.5 TABLET ORAL at 17:16

## 2024-01-01 RX ADMIN — DEXTROSE 150 MG: 50 INJECTION, SOLUTION INTRAVENOUS at 11:27

## 2024-01-01 RX ADMIN — POTASSIUM CHLORIDE 40 MEQ: 29.8 INJECTION, SOLUTION INTRAVENOUS at 06:26

## 2024-01-01 RX ADMIN — Medication 2 MG/HR: at 05:47

## 2024-01-01 RX ADMIN — POTASSIUM CHLORIDE 20 MEQ: 14.9 INJECTION, SOLUTION INTRAVENOUS at 23:47

## 2024-01-01 RX ADMIN — PANTOPRAZOLE SODIUM 40 MG: 40 TABLET, DELAYED RELEASE ORAL at 05:09

## 2024-01-01 RX ADMIN — POLYETHYLENE GLYCOL 3350 17 G: 17 POWDER, FOR SOLUTION ORAL at 08:56

## 2024-01-01 RX ADMIN — HYDROMORPHONE HYDROCHLORIDE 0.5 MG: 1 INJECTION, SOLUTION INTRAMUSCULAR; INTRAVENOUS; SUBCUTANEOUS at 00:31

## 2024-01-01 RX ADMIN — Medication 2 MG/HR: at 22:58

## 2024-01-01 RX ADMIN — HEPARIN SODIUM 5000 UNITS: 5000 INJECTION INTRAVENOUS; SUBCUTANEOUS at 06:14

## 2024-01-01 RX ADMIN — MUPIROCIN 1 APPLICATION: 20 OINTMENT TOPICAL at 20:59

## 2024-01-01 RX ADMIN — MUPIROCIN 1 APPLICATION: 20 OINTMENT TOPICAL at 20:38

## 2024-01-01 RX ADMIN — Medication 2 MG/HR: at 09:21

## 2024-01-01 RX ADMIN — MUPIROCIN 1 APPLICATION: 20 OINTMENT TOPICAL at 20:06

## 2024-01-01 RX ADMIN — COAGULATION FACTOR VIIA (RECOMBINANT) 2 MG: KIT at 16:08

## 2024-01-01 RX ADMIN — METOLAZONE 10 MG: 10 TABLET ORAL at 21:39

## 2024-01-01 RX ADMIN — METOPROLOL TARTRATE 25 MG: 25 TABLET, FILM COATED ORAL at 21:25

## 2024-01-01 RX ADMIN — NOREPINEPHRINE BITARTRATE 26 MCG/MIN: 1 SOLUTION INTRAVENOUS at 03:50

## 2024-01-01 RX ADMIN — LEVALBUTEROL HYDROCHLORIDE 1.25 MG: 1.25 SOLUTION RESPIRATORY (INHALATION) at 07:29

## 2024-01-01 RX ADMIN — METOLAZONE 10 MG: 10 TABLET ORAL at 20:42

## 2024-01-01 RX ADMIN — BUMETANIDE 1 MG: 0.25 INJECTION INTRAMUSCULAR; INTRAVENOUS at 01:02

## 2024-01-01 RX ADMIN — FERROUS SULFATE TAB 325 MG (65 MG ELEMENTAL FE) 325 MG: 325 (65 FE) TAB at 09:23

## 2024-01-01 RX ADMIN — CHLORHEXIDINE GLUCONATE 15 ML: 1.2 SOLUTION ORAL at 17:43

## 2024-01-01 RX ADMIN — DIGOXIN 250 MCG: 0.25 INJECTION INTRAMUSCULAR; INTRAVENOUS at 10:05

## 2024-01-01 RX ADMIN — SODIUM CHLORIDE SOLN NEBU 3% 4 ML: 3 NEBU SOLN at 07:10

## 2024-01-01 RX ADMIN — INSULIN LISPRO 1 UNITS: 100 INJECTION, SOLUTION INTRAVENOUS; SUBCUTANEOUS at 12:21

## 2024-01-01 RX ADMIN — AMIODARONE HYDROCHLORIDE 200 MG: 200 TABLET ORAL at 05:58

## 2024-01-01 RX ADMIN — LEVALBUTEROL HYDROCHLORIDE 1.25 MG: 1.25 SOLUTION RESPIRATORY (INHALATION) at 20:15

## 2024-01-01 RX ADMIN — LEVALBUTEROL HYDROCHLORIDE 1.25 MG: 1.25 SOLUTION RESPIRATORY (INHALATION) at 19:20

## 2024-01-01 RX ADMIN — FERROUS SULFATE TAB 325 MG (65 MG ELEMENTAL FE) 325 MG: 325 (65 FE) TAB at 07:30

## 2024-01-01 RX ADMIN — SODIUM CHLORIDE, SODIUM LACTATE, POTASSIUM CHLORIDE, AND CALCIUM CHLORIDE 50 ML/HR: .6; .31; .03; .02 INJECTION, SOLUTION INTRAVENOUS at 09:17

## 2024-01-01 RX ADMIN — VASOPRESSIN 0.04 UNITS/MIN: 20 INJECTION INTRAVENOUS at 00:09

## 2024-01-01 RX ADMIN — GABAPENTIN 200 MG: 100 CAPSULE ORAL at 15:54

## 2024-01-01 RX ADMIN — CHLORHEXIDINE GLUCONATE 15 ML: 1.2 SOLUTION ORAL at 08:54

## 2024-01-01 RX ADMIN — LEVALBUTEROL HYDROCHLORIDE 1.25 MG: 1.25 SOLUTION RESPIRATORY (INHALATION) at 19:17

## 2024-01-01 RX ADMIN — CHLORHEXIDINE GLUCONATE 15 ML: 1.2 SOLUTION ORAL at 21:41

## 2024-01-01 RX ADMIN — AMIODARONE HYDROCHLORIDE 200 MG: 200 TABLET ORAL at 06:52

## 2024-01-01 RX ADMIN — LEVALBUTEROL HYDROCHLORIDE 1.25 MG: 1.25 SOLUTION RESPIRATORY (INHALATION) at 07:22

## 2024-01-01 RX ADMIN — CHLORHEXIDINE GLUCONATE 15 ML: 1.2 SOLUTION ORAL at 20:52

## 2024-01-01 RX ADMIN — GABAPENTIN 800 MG: 400 CAPSULE ORAL at 15:51

## 2024-01-01 RX ADMIN — LEVALBUTEROL HYDROCHLORIDE 1.25 MG: 1.25 SOLUTION RESPIRATORY (INHALATION) at 20:16

## 2024-01-01 RX ADMIN — POTASSIUM CHLORIDE 20 MEQ: 14.9 INJECTION, SOLUTION INTRAVENOUS at 01:00

## 2024-01-01 RX ADMIN — INSULIN LISPRO 1 UNITS: 100 INJECTION, SOLUTION INTRAVENOUS; SUBCUTANEOUS at 05:56

## 2024-01-01 RX ADMIN — Medication 4 MG/HR: at 00:19

## 2024-01-01 RX ADMIN — GABAPENTIN 200 MG: 100 CAPSULE ORAL at 21:28

## 2024-01-01 RX ADMIN — ASPIRIN 81 MG: 81 TABLET, COATED ORAL at 08:05

## 2024-01-01 RX ADMIN — GABAPENTIN 200 MG: 100 CAPSULE ORAL at 20:18

## 2024-01-01 RX ADMIN — ALBUMIN (HUMAN) 25 G: 0.25 INJECTION, SOLUTION INTRAVENOUS at 16:34

## 2024-01-01 RX ADMIN — MILRINONE LACTATE IN DEXTROSE 0.13 MCG/KG/MIN: 200 INJECTION, SOLUTION INTRAVENOUS at 19:36

## 2024-01-01 RX ADMIN — ATORVASTATIN CALCIUM 80 MG: 80 TABLET, FILM COATED ORAL at 16:19

## 2024-01-01 RX ADMIN — POLYETHYLENE GLYCOL 3350 17 G: 17 POWDER, FOR SOLUTION ORAL at 08:39

## 2024-01-01 RX ADMIN — BUMETANIDE 2 MG: 0.25 INJECTION INTRAMUSCULAR; INTRAVENOUS at 20:52

## 2024-01-01 RX ADMIN — ACETAMINOPHEN 650 MG: 325 TABLET, FILM COATED ORAL at 13:23

## 2024-01-01 RX ADMIN — METOPROLOL TARTRATE 50 MG: 50 TABLET, FILM COATED ORAL at 20:26

## 2024-01-01 RX ADMIN — CHLORHEXIDINE GLUCONATE 15 ML: 1.2 SOLUTION ORAL at 08:05

## 2024-01-01 RX ADMIN — LEVALBUTEROL HYDROCHLORIDE 1.25 MG: 1.25 SOLUTION RESPIRATORY (INHALATION) at 19:59

## 2024-01-01 RX ADMIN — HYDROMORPHONE HYDROCHLORIDE 0.5 MG: 1 INJECTION, SOLUTION INTRAMUSCULAR; INTRAVENOUS; SUBCUTANEOUS at 15:42

## 2024-01-01 RX ADMIN — SODIUM CHLORIDE, SODIUM LACTATE, POTASSIUM CHLORIDE, AND CALCIUM CHLORIDE 100 ML: .6; .31; .03; .02 INJECTION, SOLUTION INTRAVENOUS at 12:18

## 2024-01-01 RX ADMIN — BUMETANIDE 1 MG: 0.25 INJECTION INTRAMUSCULAR; INTRAVENOUS at 08:39

## 2024-01-01 RX ADMIN — MAGNESIUM HYDROXIDE 30 ML: 1200 LIQUID ORAL at 17:26

## 2024-01-01 RX ADMIN — VASOPRESSIN 0.08 UNITS/MIN: 20 INJECTION INTRAVENOUS at 06:02

## 2024-01-01 RX ADMIN — POLYETHYLENE GLYCOL 3350 17 G: 17 POWDER, FOR SOLUTION ORAL at 08:33

## 2024-01-01 RX ADMIN — SODIUM CHLORIDE SOLN NEBU 3% 4 ML: 3 NEBU SOLN at 07:27

## 2024-01-01 RX ADMIN — ACETAMINOPHEN 650 MG: 325 TABLET, FILM COATED ORAL at 08:08

## 2024-01-01 RX ADMIN — METOLAZONE 5 MG: 5 TABLET ORAL at 14:24

## 2024-01-01 RX ADMIN — AMIODARONE HYDROCHLORIDE 200 MG: 200 TABLET ORAL at 21:24

## 2024-01-01 RX ADMIN — PRAMIPEXOLE DIHYDROCHLORIDE 1 MG: 1 TABLET ORAL at 23:18

## 2024-01-03 ENCOUNTER — OFFICE VISIT (OUTPATIENT)
Age: 84
End: 2024-01-03
Payer: MEDICARE

## 2024-01-03 ENCOUNTER — OFFICE VISIT (OUTPATIENT)
Dept: WOUND CARE | Facility: HOSPITAL | Age: 84
End: 2024-01-03
Payer: MEDICARE

## 2024-01-03 ENCOUNTER — TELEPHONE (OUTPATIENT)
Age: 84
End: 2024-01-03

## 2024-01-03 VITALS
HEART RATE: 79 BPM | SYSTOLIC BLOOD PRESSURE: 136 MMHG | TEMPERATURE: 97.6 F | RESPIRATION RATE: 17 BRPM | DIASTOLIC BLOOD PRESSURE: 80 MMHG

## 2024-01-03 VITALS — HEIGHT: 64 IN | RESPIRATION RATE: 17 BRPM | BODY MASS INDEX: 37.39 KG/M2 | WEIGHT: 219 LBS

## 2024-01-03 DIAGNOSIS — L84 CORNS: ICD-10-CM

## 2024-01-03 DIAGNOSIS — I70.209 PERIPHERAL ARTERIOSCLEROSIS (HCC): ICD-10-CM

## 2024-01-03 DIAGNOSIS — S81.802A TRAUMATIC OPEN WOUND OF LEFT LOWER LEG, INITIAL ENCOUNTER: ICD-10-CM

## 2024-01-03 DIAGNOSIS — M79.672 PAIN IN BOTH FEET: ICD-10-CM

## 2024-01-03 DIAGNOSIS — I89.0 LYMPHEDEMA OF BOTH LOWER EXTREMITIES: ICD-10-CM

## 2024-01-03 DIAGNOSIS — S81.801A TRAUMATIC OPEN WOUND OF RIGHT LOWER LEG, INITIAL ENCOUNTER: Primary | ICD-10-CM

## 2024-01-03 DIAGNOSIS — R60.9 LIPEDEMA: ICD-10-CM

## 2024-01-03 DIAGNOSIS — M79.671 PAIN IN BOTH FEET: ICD-10-CM

## 2024-01-03 DIAGNOSIS — Z79.01 CHRONIC ANTICOAGULATION: ICD-10-CM

## 2024-01-03 DIAGNOSIS — E11.42 DIABETIC POLYNEUROPATHY ASSOCIATED WITH TYPE 2 DIABETES MELLITUS (HCC): Primary | ICD-10-CM

## 2024-01-03 PROCEDURE — 97597 DBRDMT OPN WND 1ST 20 CM/<: CPT | Performed by: STUDENT IN AN ORGANIZED HEALTH CARE EDUCATION/TRAINING PROGRAM

## 2024-01-03 PROCEDURE — 11056 PARNG/CUTG B9 HYPRKR LES 2-4: CPT | Performed by: PODIATRIST

## 2024-01-03 NOTE — LETTER
[facility]  [address] [place] [id number]  Phone#  [phone]  Fax#  [phone]    Patient:  Natividad Ortiz  YOB: 1940  Phone:  [phone]  Date of Visit:  1/3/2024    Orders Placed This Encounter   Procedures   • Wound cleansing and dressings Traumatic Left;Lateral Leg     Left leg wound:     Washed with soap and water.  Rinsed with normal saline  Shower: Yes  Apply Hydraguard to skin surrounding wound  Apply Dermagran to the wound .  Cover with gauze.  Secure with rolled gauze and tape.     Dressing to be changed three times per week.      This was done today.     Standing Status:   Future     Standing Expiration Date:   1/3/2025   • Wound compression and edema control     Wound compression and edema control        Elastic Tubular Stocking: Spandagrip G to right and left lower legs     Tubular elastic bandage: Apply from base of toes to behind the knee. Apply in AM, may remove for sleep.     Avoid prolonged standing in one place.     Elevate leg(s) above the level of the heart when sitting or as much as possible.                          ·     Standing Status:   Future     Standing Expiration Date:   1/3/2025   • Wound home care Traumatic Left;Lateral Leg     [facility]: Please continue seeing pt for wound care.     Standing Status:   Future     Standing Expiration Date:   1/3/2025   • Wound cleansing and dressings Traumatic Right;Anterior Leg     Right Leg Wound is healed.    Shower: Yes  Moisturize your leg daily.  Continue to elevate legs whenever possible to control swelling.     Standing Status:   Future     Standing Expiration Date:   1/3/2025         Electronically signed by Talia Damico MD

## 2024-01-03 NOTE — PROGRESS NOTES
Patient ID: Natividad Ortiz is a 83 y.o. female Date of Birth 1940     Chief Complaint  Chief Complaint   Patient presents with    Follow Up Wound Care Visit     RLE and LLE       Allergies  Latex, Duloxetine hcl, Erythromycin, Levofloxacin, Penicillins, Savella [milnacipran], and Sulfa antibiotics    Assessment:     Diagnoses and all orders for this visit:    Traumatic open wound of right lower leg, initial encounter  -     Wound cleansing and dressings Traumatic Left;Lateral Leg; Future  -     Wound compression and edema control; Future  -     Wound home care Traumatic Left;Lateral Leg; Future  -     Wound cleansing and dressings Traumatic Right;Anterior Leg; Future    Traumatic open wound of left lower leg, initial encounter  -     Wound cleansing and dressings Traumatic Left;Lateral Leg; Future  -     Wound compression and edema control; Future  -     Wound home care Traumatic Left;Lateral Leg; Future  -     Wound cleansing and dressings Traumatic Right;Anterior Leg; Future  -     Debridement    Lymphedema of both lower extremities  -     Wound cleansing and dressings Traumatic Left;Lateral Leg; Future  -     Wound compression and edema control; Future  -     Wound home care Traumatic Left;Lateral Leg; Future  -     Wound cleansing and dressings Traumatic Right;Anterior Leg; Future    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HCC)  -     Wound cleansing and dressings Traumatic Left;Lateral Leg; Future  -     Wound compression and edema control; Future  -     Wound home care Traumatic Left;Lateral Leg; Future  -     Wound cleansing and dressings Traumatic Right;Anterior Leg; Future    Lipedema  -     Wound cleansing and dressings Traumatic Left;Lateral Leg; Future  -     Wound compression and edema control; Future  -     Wound home care Traumatic Left;Lateral Leg; Future  -     Wound cleansing and dressings Traumatic Right;Anterior Leg; Future    Chronic anticoagulation  -     Wound cleansing and dressings Traumatic  "Left;Lateral Leg; Future  -     Wound compression and edema control; Future  -     Wound home care Traumatic Left;Lateral Leg; Future  -     Wound cleansing and dressings Traumatic Right;Anterior Leg; Future              Debridement   Wound 09/19/23 Traumatic Leg Left;Lateral    Universal Protocol:  Consent: Verbal consent obtained.  Risks and benefits: risks, benefits and alternatives were discussed  Consent given by: patient  Time out: Immediately prior to procedure a \"time out\" was called to verify the correct patient, procedure, equipment, support staff and site/side marked as required.  Patient identity confirmed: verbally with patient    Debridement Details  Performed by: physician  Debridement type: selective  Pain control: lidocaine 4%      Post-debridement measurements  Length (cm): 0.1  Width (cm): 0.1  Depth (cm): 0.1  Percent debrided: 90%  Surface Area (cm^2): 0.01  Area Debrided (cm^2): 0.01  Volume (cm^3): 0    Devitalized tissue debrided: biofilm and exudate  Instrument(s) utilized: curette  Bleeding: small  Hemostasis obtained with: pressure  Procedural pain (0-10): 1  Post-procedural pain: 0   Response to treatment: procedure was tolerated well        Plan:   It was a pleasure to see Natividad Ortiz for wound care follow up today  Wound of LLE selectively debrided today  Wound is improving.  Right lower extremity wound fully epithelialized and left lower extremity wound close to full epithelialization.  Continue plan of care as noted below with change to deramgran   A1C results reviewed with the patient today.   No signs or symptoms of infection today. Patient understands that if any signs of infection start (such as increased redness, drainage, pain, fever, chills, diaphoresis), they should call our office or proceed to the ER or Urgent Care.  Patient should continue a high protein diet to facilitate wound healing  Patient is advised to not submerge wound or leave wound open to air.  Follow up " in 1 weeks  Given the multi-factorial nature of wound care, additional time was taken to review patient's treatment plan with other specialties and most recent pertinent lab work and imaging.   All plans of care discussed with patient at bedside who verbalized understanding with treatment plan.    Wound 09/19/23 Traumatic Leg Left;Lateral (Active)   Wound Image Images linked 01/03/24 1036   Wound Description Other (Comment);Pink (scab) 01/03/24 1036   Dorys-wound Assessment Hyperpigmented;Edema 01/03/24 1036   Wound Length (cm) 0.1 cm 01/03/24 1036   Wound Width (cm) 0.1 cm 01/03/24 1036   Wound Depth (cm) 0.1 cm 01/03/24 1036   Wound Surface Area (cm^2) 0.01 cm^2 01/03/24 1036   Wound Volume (cm^3) 0.001 cm^3 01/03/24 1036   Calculated Wound Volume (cm^3) 0 cm^3 01/03/24 1036   Change in Wound Size % 100 01/03/24 1036   Drainage Amount Scant 01/03/24 1036   Drainage Description Serous 01/03/24 1036   Non-staged Wound Description Full thickness 01/03/24 1036   Dressing Status Intact (upon arrival) 01/03/24 1036       Wound 09/26/23 Traumatic Leg Right;Anterior (Active)   Wound Image Images linked 01/03/24 1035   Wound Description Dry;Other (Comment) (scab) 01/03/24 1035   Dorys-wound Assessment Dry;Hyperpigmented;Edema;Pink 01/03/24 1035   Wound Length (cm) 0.1 cm 01/03/24 1035   Wound Width (cm) 0.1 cm 01/03/24 1035   Wound Depth (cm) 0 cm 01/03/24 1035   Wound Surface Area (cm^2) 0.01 cm^2 01/03/24 1035   Wound Volume (cm^3) 0 cm^3 01/03/24 1035   Calculated Wound Volume (cm^3) 0 cm^3 01/03/24 1035   Change in Wound Size % 100 01/03/24 1035   Drainage Amount None 01/03/24 1035   Non-staged Wound Description Not applicable 01/03/24 1035   Dressing Status Intact (upon arrival) 01/03/24 1035       Wound 09/19/23 Traumatic Leg Left;Lateral (Active)   Date First Assessed/Time First Assessed: 09/19/23 0938   Primary Wound Type: Traumatic  Location: Leg  Wound Location Orientation: Left;Lateral       Wound 09/26/23  Traumatic Leg Right;Anterior (Active)   Date First Assessed/Time First Assessed: 09/26/23 0950   Primary Wound Type: Traumatic  Location: Leg  Wound Location Orientation: Right;Anterior       [REMOVED] Wound 08/17/20 Knee Left (Removed)   Resolved Date: 09/19/23  Date First Assessed/Time First Assessed: 08/17/20 0936   Location: Knee  Wound Location Orientation: Left  Wound Description (Comments): darryl stocking  Incision's 1st Dressing: ADHESIVE SKIN CLOSR DERMABOND PRINEO, DRESSING MEP...       [REMOVED] Wound 03/28/22 Knee Right (Removed)   Resolved Date: 09/19/23  Date First Assessed/Time First Assessed: 03/28/22 0854   Location: Knee  Wound Location Orientation: Right  Wound Description (Comments): DARRYL stockings applied to bilateral lower legs  Incision's 1st Dressing: ADHESIVE SKIN HI...       [REMOVED] Wound 04/11/22 Surgical Knee Right (Removed)   Resolved Date: 09/19/23  Date First Assessed/Time First Assessed: 04/11/22 2000   Primary Wound Type: Surgical  Location: Knee  Wound Location Orientation: Right  Wound Outcome: Unknown (No longer present)       [REMOVED] Wound 09/05/23 Catheter entry/exit site Wrist Right (Removed)   Resolved Date: 09/19/23  Date First Assessed/Time First Assessed: 09/05/23 0921   Traumatic Wound Type: Catheter entry/exit site  Location: Wrist  Wound Location Orientation: Right  Wound Outcome: Unknown (No longer present)       [REMOVED] Wound 09/05/23 Skin Tear Pretibial Left;Outer (Removed)   Resolved Date: 09/19/23  Date First Assessed/Time First Assessed: 09/05/23 0730   Present on Original Admission: Yes  Primary Wound Type: Skin Tear  Location: Pretibial  Wound Location Orientation: Left;Outer  Wound Outcome: (c) Other (Comment)       Subjective:      .    1/3/24: Applying wound dressing as directed.  Believes wounds are healed today.  Happy with wound healing.  No symptoms of infection.  Patient does note today that she has been using bordered gauze on the left lower extremity  wound that it may have caused changes in the skin to the periwound.    12/20/23, 12/13/23: VNA coming to the home and applying collagen as directed.  Patient happy with wound healing.  Denies any symptoms of infection today.     12/6/23: Since last visit patient was hospitalized for cellulitis and elevated INR.  She was treated inpatient with vancomycin and discharged on cefadroxil.  Notably it was noted that patient had coagulopathy while on Xarelto.  During her hospitalization oncology was consulted and the decision was made to stop Xarelto until further evaluation is done of patient's coagulopathy.      11/29/23, 11/22/23: Happy with wound healing. No symptoms of infection.     11/15/23: Patient is happy with wound healing.  Note skin tear on lateral left lower extremity.  No symptoms of infection.     11/8/23: Patient is happy with wound healing.  Notes that there is less drainage from the wound.  No symptoms of infection today.     11/1/23, 10/24/23: Patient happy with wound healing.  Wounds are looking better today.  No symptoms of infection.     10/10/23: Wound care being performed by patient's son and VNA who are coming twice per week.  Patient notes that she did have increased leg swelling since her last visit.  Overall happy with wound healing     9/26/23: Dressing changes from VNA and her son.  Denies any local or systemic symptoms of infection today.  Happy with wound healing.  Has been elevating legs.     9/19/23: Consult - Natividad is a pleasant 82-year-old female with a past medical history of type 2 diabetes mellitus most recent A1c 5.8% 1 month ago, diabetic polyneuropathy, atrial  fibrillation on Xarelto, obesity, history of heart failure with most recent echocardiogram showing normal function here today for initial wound care consult.  Per chart review patient sustained the wound of her left lower extremity on August 21, 2023 when she closed a car door on her leg.  She went the next day to her PCP  and was advised to apply warm compresses.  She has been seen in the ED multiple times for wound checks.  Initially she has been put on clindamycin and given Tdap vaccination. Wound culture taken which grew 1+ staph coag negative, and later wound culture grew 1+ Staph epidermidis during inpatient stay.  She was admitted for 3 days from 9/5 to 9/8/2023 at [facility] and underwent bedside I&D with placement of wound VAC by podiatry.  Arterial duplex showed no significant arterial disease however PVR tracings were dampened.  Great toe pressure  within healing range.  Blood cultures were negative.  She is instructed to follow-up outpatient with podiatry and VNA was set up for patient.  Recently patient was seen again in ER on September 15, 2023 when there was concern again for infection of the wound.  She was prescribed clindamycin and referred to wound management.  Referral was placed by physician assistant Susi Horn.  Today patient continues to take clindamycin as directed.  Denies any local or systemic signs of infection including fever chills diaphoresis.     9/5/23: LEADs  RIGHT LOWER LIMB:  No evidence of significant lower extremity arterial occlusive disease.  Ankle/Brachial index: 2.11 which is unreliable due to non compressible vessels.  PVR/ PPG tracings are dampened.  Metatarsal pressure of 109 mmHg  Great toe pressure of 81 mmHg, within the healing range.     LEFT LOWER LIMB:  No evidence of significant lower extremity arterial occlusive disease.  Ankle/Brachial index: 2.11 which is unreliable due to non compressible vessels.  PVR/ PPG tracings are dampened.  Metatarsal pressure of 152 mmHg  Great toe pressure of 92 mmHg, within the healing range.                   The following portions of the patient's history were reviewed and updated as appropriate: allergies, current medications, past family history, past medical history, past social history, past surgical history, and  problem list.    Review of Systems   Constitutional:  Negative for chills, diaphoresis, fatigue and fever.   Skin:  Positive for wound.         Objective:       Wound 09/19/23 Traumatic Leg Left;Lateral (Active)   Wound Image Images linked 01/03/24 1036   Wound Description Other (Comment);Pink (scab) 01/03/24 1036   Dorys-wound Assessment Hyperpigmented;Edema 01/03/24 1036   Wound Length (cm) 0.1 cm 01/03/24 1036   Wound Width (cm) 0.1 cm 01/03/24 1036   Wound Depth (cm) 0.1 cm 01/03/24 1036   Wound Surface Area (cm^2) 0.01 cm^2 01/03/24 1036   Wound Volume (cm^3) 0.001 cm^3 01/03/24 1036   Calculated Wound Volume (cm^3) 0 cm^3 01/03/24 1036   Change in Wound Size % 100 01/03/24 1036   Drainage Amount Scant 01/03/24 1036   Drainage Description Serous 01/03/24 1036   Non-staged Wound Description Full thickness 01/03/24 1036   Dressing Status Intact (upon arrival) 01/03/24 1036       Wound 09/26/23 Traumatic Leg Right;Anterior (Active)   Wound Image Images linked 01/03/24 1035   Wound Description Dry;Other (Comment) (scab) 01/03/24 1035   Dorys-wound Assessment Dry;Hyperpigmented;Edema;Pink 01/03/24 1035   Wound Length (cm) 0.1 cm 01/03/24 1035   Wound Width (cm) 0.1 cm 01/03/24 1035   Wound Depth (cm) 0 cm 01/03/24 1035   Wound Surface Area (cm^2) 0.01 cm^2 01/03/24 1035   Wound Volume (cm^3) 0 cm^3 01/03/24 1035   Calculated Wound Volume (cm^3) 0 cm^3 01/03/24 1035   Change in Wound Size % 100 01/03/24 1035   Drainage Amount None 01/03/24 1035   Non-staged Wound Description Not applicable 01/03/24 1035   Dressing Status Intact (upon arrival) 01/03/24 1035       Resp 17     Physical Exam  Vitals reviewed.   Constitutional:       Appearance: Normal appearance.   HENT:      Head: Normocephalic and atraumatic.      Mouth/Throat:      Mouth: Mucous membranes are moist.   Eyes:      Extraocular Movements: Extraocular movements intact.   Pulmonary:      Effort: Pulmonary effort is normal.   Musculoskeletal:      Right lower  leg: Edema present.      Left lower leg: Edema present.   Skin:     Comments: Hyperpigmentation of bilateral lower extremities consistent with chronic venous stasis disease.    Anterior right knee wound is fully epithelialized today with no open wound or exudate.    Lateral left lower extremity wound much smaller than last exam.  Small pinpoint area of exudate.  There is irritation to the periwound that without apparent at prior exam.  No erythema or signs of infection.   Neurological:      Mental Status: She is alert.   Psychiatric:         Mood and Affect: Mood normal.         Behavior: Behavior normal.                 Wound Instructions:  Orders Placed This Encounter   Procedures    Wound cleansing and dressings Traumatic Left;Lateral Leg     Left leg wound:     Washed with soap and water.  Rinsed with normal saline  Shower: Yes  Apply Hydraguard to skin surrounding wound  Apply Dermagran to the wound .  Cover with gauze.  Secure with rolled gauze and tape.     Dressing to be changed three times per week.      This was done today.     Standing Status:   Future     Standing Expiration Date:   1/3/2025    Wound compression and edema control     Wound compression and edema control        Elastic Tubular Stocking: Spandagrip G to right and left lower legs     Tubular elastic bandage: Apply from base of toes to behind the knee. Apply in AM, may remove for sleep.     Avoid prolonged standing in one place.     Elevate leg(s) above the level of the heart when sitting or as much as possible.                          ·     Standing Status:   Future     Standing Expiration Date:   1/3/2025    Wound home care Traumatic Left;Lateral Leg     [facility]: Please continue seeing pt for wound care.     Standing Status:   Future     Standing Expiration Date:   1/3/2025    Wound cleansing and dressings Traumatic Right;Anterior Leg     Right Leg Wound is healed.    Shower: Yes  Moisturize your leg daily.  Continue to elevate  "legs whenever possible to control swelling.     Standing Status:   Future     Standing Expiration Date:   1/3/2025    Debridement     This order was created via procedure documentation        Diagnosis ICD-10-CM Associated Orders   1. Traumatic open wound of right lower leg, initial encounter  S81.801A Wound cleansing and dressings Traumatic Left;Lateral Leg     Wound compression and edema control     Wound home care Traumatic Left;Lateral Leg     Wound cleansing and dressings Traumatic Right;Anterior Leg      2. Traumatic open wound of left lower leg, initial encounter  S81.802A Wound cleansing and dressings Traumatic Left;Lateral Leg     Wound compression and edema control     Wound home care Traumatic Left;Lateral Leg     Wound cleansing and dressings Traumatic Right;Anterior Leg     Debridement      3. Lymphedema of both lower extremities  I89.0 Wound cleansing and dressings Traumatic Left;Lateral Leg     Wound compression and edema control     Wound home care Traumatic Left;Lateral Leg     Wound cleansing and dressings Traumatic Right;Anterior Leg      4. 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HCC)  I70.209 Wound cleansing and dressings Traumatic Left;Lateral Leg     Wound compression and edema control     Wound home care Traumatic Left;Lateral Leg     Wound cleansing and dressings Traumatic Right;Anterior Leg      5. Lipedema  R60.9 Wound cleansing and dressings Traumatic Left;Lateral Leg     Wound compression and edema control     Wound home care Traumatic Left;Lateral Leg     Wound cleansing and dressings Traumatic Right;Anterior Leg      6. Chronic anticoagulation  Z79.01 Wound cleansing and dressings Traumatic Left;Lateral Leg     Wound compression and edema control     Wound home care Traumatic Left;Lateral Leg     Wound cleansing and dressings Traumatic Right;Anterior Leg          --  Talia Damico MD    \"This note has been constructed using a voice recognition system. Therefore there may be syntax, spelling, " "and/or grammatical errors. Occasional wrong word or \"sound alike\" substitutions may have occurred due to the inherent limitations of voice recognition software. Read the chart carefully and recognize, using context, where substitutions have occurred. Please call if you have any questions.\"     "

## 2024-01-03 NOTE — TELEPHONE ENCOUNTER
Please clarisse patient.  Monitor stools for now. If you are consistently having white stools, let me know.

## 2024-01-03 NOTE — TELEPHONE ENCOUNTER
The patient called she had white stools 12/31/23 and 1/1/24    yesterday she did not have any stools and today they are light brown   ---

## 2024-01-03 NOTE — PATIENT INSTRUCTIONS
Orders Placed This Encounter   Procedures    Wound cleansing and dressings Traumatic Left;Lateral Leg     Left leg wound:     Washed with soap and water.  Rinsed with normal saline  Shower: Yes  Apply Hydraguard to skin surrounding wound  Apply Dermagran to the wound .  Cover with gauze.  Secure with rolled gauze and tape.     Dressing to be changed three times per week.      This was done today.     Standing Status:   Future     Standing Expiration Date:   1/3/2025    Wound compression and edema control     Wound compression and edema control        Elastic Tubular Stocking: Spandagrip G to right and left lower legs     Tubular elastic bandage: Apply from base of toes to behind the knee. Apply in AM, may remove for sleep.     Avoid prolonged standing in one place.     Elevate leg(s) above the level of the heart when sitting or as much as possible.                          ·     Standing Status:   Future     Standing Expiration Date:   1/3/2025    Wound home care Traumatic Left;Lateral Leg     [facility]: Please continue seeing pt for wound care.     Standing Status:   Future     Standing Expiration Date:   1/3/2025    Wound cleansing and dressings Traumatic Right;Anterior Leg     Right Leg Wound is healed.    Shower: Yes  Moisturize your leg daily.  Continue to elevate legs whenever possible to control swelling.     Standing Status:   Future     Standing Expiration Date:   1/3/2025

## 2024-01-03 NOTE — PROGRESS NOTES
Assessment/Plan:  Painful calluses.  Diabetic neuropathy.  Peripheral vascular disease.  Chronic edema.  Chronic plantar fasciitis left foot.  Deep venous insufficiency.        Plan.  Chart reviewed.  foot exam performed.  All mycotic nails debrided.  Plantar calluses debrided without pain or complication.  Procedures performed without pain or complication.  Patient will follow with vascular surgery for continued care and work-up of deep venous insufficiency.  Aftercare instruction given.  Return for follow-up.        Subjective.  Patient is diabetic.  She has pain with walking.  She has pain with shoe wear.  No history of trauma.  Patient suffers from chronic edema of legs.  She has had many bouts of cellulitis.     Diabetic polyneuropathy associated with type 2 diabetes mellitus (HCC)     Corns     Pain in both feet     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HCC)     Chronic edema.  Rule out deep venous insufficiency       Discussion/Summary  The patient was counseled regarding instructions for management,-- prognosis,-- patient and family education,-- risks and benefits of treatment options,-- importance of compliance with treatment.   Patient is able to Self-Care.   Possible side effects of new medications were reviewed with the patient/guardian today. The treatment plan was reviewed with the patient/guardian. The patient/guardian understands and agrees with the treatment plan      Chief Complaint  Patient has complaint of pain in her toes with ambulation.  No history of trauma     History of Present Illness  HPI: Patient is doing well with Cymbalta however she began have facial tingling. She discontinued medicine. However the medication was relieving her neuropathic pain.      Review of Systems     ROS reviewed.      Active Problems     1. Achilles tendinitis of left lower extremity (726.71) (M76.62)   2. Acquired ankle/foot deformity (736.70) (M21.969)   3. AF (paroxysmal atrial fibrillation) (427.31) (I48.0)   4.  Anticoagulant long-term use (V58.61) (Z79.01)   5. Arthritis (716.90) (M19.90)   6. At risk for bone density loss (V49.89) (Z91.89)   7. Atrial fibrillation (427.31) (I48.91)   8. History of Breast Cancer (V10.3)   9. Difficulty walking (719.7) (R26.2)   10. Encounter for screening mammogram for breast cancer (V76.12) (Z12.31)   11. Esophageal reflux (530.81) (K21.9)   12. Foot pain, bilateral (729.5) (M79.671,M79.672)   13. Hiatal hernia (553.3) (K44.9)   14. History of Carrero's esophagus (V12.79) (Z87.19)   15. History of fall (V15.88) (Z91.81)   16. Hypertension (401.9) (I10)   17. Leg pain, left (729.5) (M79.605)   18. Lichen sclerosus et atrophicus (701.0) (L90.0)   19. Lumbar radiculopathy (724.4) (M54.16)   20. Multiple lung nodules (793.19) (R91.8)   21. Obesity (278.00) (E66.9)   22. Onychomycosis (110.1) (B35.1)   23. Osteopenia (733.90) (M85.80)   24. Pacemaker (V45.01) (Z95.0)   25. Peripheral neuropathy (356.9) (G62.9)   26. Pes planus of both feet (734) (M21.41,M21.42)   27. Plantar fasciitis of left foot (728.71) (M72.2)   28. Restless legs syndrome (333.94) (G25.81)     Past Medical History   · History of Abnormal glucose (790.29) (R73.09)   · Atrial fibrillation (427.31) (I48.91)   · History of Breast Cancer (V10.3)   · 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.8) (Q84.6)   · Esophageal reflux (530.81) (K21.9)   · Denied: History of Exposure To STD   · History of Gross hematuria (599.71) (R31.0)   · History of Hematoma (924.9) (T14.8XXA)   · History of Hematoma of lower extremity, right, initial encounter (924.5) (S80.11XA)   · History of backache (V13.59) (Z87.39)   · History of Carrero's esophagus (V12.79) (Z87.19)   · History of cataract (V12.49) (Z86.69)   · History of chest pain (V13.89) (Z87.898)   · History of fall (V15.88) (Z91.81)   · History of hematuria (V13.09) (Z87.448)   · History of postmenopausal hormone replacement therapy (V87.49) (Z92.29)   · History of shortness of breath (V13.89)  (Z87.898)   · History of urinary incontinence (V13.09) (Z87.898)   · History of Neck pain (723.1) (M54.2)   · Normal delivery (650) (O80,Z37.9)   · History of Right knee pain (719.46) (M25.561)   · History of Ringing In The Ears (Tinnitus) (388.30)   · History of Skin rash (782.1) (R21)   · History of Traumatic blister of right lower extremity, initial encounter (916.2) (S80.821A)   · History of UTI (lower urinary tract infection) (599.0) (N39.0)     The active problems and past medical history were reviewed and updated today.      Surgical History   · Denied: History of Abnormal Pap Smear Of Cervix   · History of Breast Surgery Lumpectomy   · History of Cataract Surgery   · History of Diagnostic Cystoscopy   · History of Excision Lesion Of Meniscus Or Capsule Knee   · History of Pacemaker - Pulse Generator Replacement   · History of Pacemaker Placement   · History of Pacemaker Placement   · History of Radiation Therapy   · History of Total Abdominal Hysterectomy With Removal Of Both Ovaries     The surgical history was reviewed and updated today.       Family History  Mother    · Denied: Family history of Alcoholism and drug addiction in family   · Denied: Family history of Anxiety and depression  Father    · Denied: Family history of Alcoholism and drug addiction in family   · Denied: Family history of Anxiety and depression   · Family history of Coronary Artery Disease (V17.49)   · Family history of abdominal aortic aneurysm (V17.49) (Z82.49)  Child    · Denied: Family history of Alcoholism and drug addiction in family   · Denied: Family history of Anxiety and depression  Sibling    · Denied: Family history of Alcoholism and drug addiction in family   · Denied: Family history of Anxiety and depression  Sister    · Family history of Breast Cancer (V16.3)  Maternal Aunt    · Family history of Colon Cancer (V16.0)   · Family history of Colon Cancer (V16.0)  Family History    · Denied: Family history of Diabetes  Mellitus   · Denied: Family history of Stroke Syndrome     The family history was reviewed and updated today.       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      The medication list was reviewed and updated today.       Allergies  1. duloxetine   2. LevoFLOXacin TABS   3. Cephalexin CAPS   4. Erythromycin Base TABS   5. Keflex TABS   6. Penicillins   7. Sulfa Drugs        Physical Exam  Left Foot: Appearance: Normal except as noted: excessive pronation-- and-- pes planus. Tenderness: None except the lisfranc joint-- and-- medial longitudinal arch. ROM: subtalar motion was restricted.   Right Foot: Appearance: Normal except as noted: excessive pronation-- and-- pes planus. Tenderness: None except the lisfranc joint-- and-- medial longitudinal arch. ROM: subtalar motion was restricted .   Left Ankle: ROM: limited ROM in all planes   Right Ankle: ROM: limited ROM in all planes   Neurological Exam: performed. Light touch was decreased bilaterally. Vibratory sensation was decreased in both first metatarsophalangeal joints. Deep tendon reflexes: achilles reflex absent bilateraly-- and-- 4/5 L5 testing bilateral.   Vascular Exam: performed Dorsalis pedis pulses were diminished bilaterally. Posterior tibial pulses were diminished bilaterally. Dependence rubor was present bilaterally. Capillary refill time was Negative digital hair noted, but-- between 1-3 seconds bilaterally.   Toenails: All of the toenails were elongated,-- hypertrophied,-- discolored-- and-- .   Hyperkeratosis: present on both first toes.   Shoe Gear Evaluation: performed (). Recommendation(s): SAS style.      Patient's shoes and socks removed.Right Foot/Ankle   Right Foot Inspection         Sensory   Vibration: diminished  Proprioception: diminished      Vascular  Capillary refills: elevated        Left Foot/Ankle  Left Foot Inspection                    Sensory   Vibration: diminished  Proprioception: diminished     Vascular  Capillary refills: elevated.     Patient's shoes and socks removed.     Right Foot/Ankle   Right Foot Inspection        Toe Exam: right toe deformity.      Sensory   Vibration: diminished        Left Foot/Ankle  Left Foot Inspection        Toe Exam: left toe deformity.      Sensory   Vibration: diminished      Assign Risk Category  Deformity present  Loss of protective sensation  Weak pulses  Risk: 2

## 2024-01-09 ENCOUNTER — OFFICE VISIT (OUTPATIENT)
Dept: GASTROENTEROLOGY | Facility: CLINIC | Age: 84
End: 2024-01-09
Payer: MEDICARE

## 2024-01-09 ENCOUNTER — TELEPHONE (OUTPATIENT)
Dept: HEMATOLOGY ONCOLOGY | Facility: CLINIC | Age: 84
End: 2024-01-09

## 2024-01-09 VITALS
DIASTOLIC BLOOD PRESSURE: 83 MMHG | SYSTOLIC BLOOD PRESSURE: 116 MMHG | BODY MASS INDEX: 38.58 KG/M2 | HEIGHT: 64 IN | HEART RATE: 82 BPM | WEIGHT: 226 LBS

## 2024-01-09 DIAGNOSIS — E66.01 SEVERE OBESITY (BMI 35.0-39.9) WITH COMORBIDITY (HCC): ICD-10-CM

## 2024-01-09 DIAGNOSIS — K21.9 GASTROESOPHAGEAL REFLUX DISEASE, UNSPECIFIED WHETHER ESOPHAGITIS PRESENT: Primary | ICD-10-CM

## 2024-01-09 DIAGNOSIS — K63.5 POLYP OF COLON, UNSPECIFIED PART OF COLON, UNSPECIFIED TYPE: ICD-10-CM

## 2024-01-09 DIAGNOSIS — D68.9 COAGULOPATHY (HCC): ICD-10-CM

## 2024-01-09 PROCEDURE — 99204 OFFICE O/P NEW MOD 45 MIN: CPT | Performed by: INTERNAL MEDICINE

## 2024-01-09 RX ORDER — PANTOPRAZOLE SODIUM 20 MG/1
20 TABLET, DELAYED RELEASE ORAL EVERY MORNING
Qty: 90 TABLET | Refills: 2 | Status: SHIPPED | OUTPATIENT
Start: 2024-01-09

## 2024-01-09 NOTE — PROGRESS NOTES
Consultation -  [facility]  Natividad Ortiz 83 y.o. female MRN: [id number]  Unit/Bed#:  Encounter: [id number]        Consults    ASSESSMENT/PLAN:     1.  GERD/dyspepsia: Having significant breakthrough symptoms of dyspepsia since being off of Protonix.  She reports not having Protonix for few months which has resulted in epigastric discomfort.  She does take famotidine 40 mg in the evenings.  Will restart pantoprazole 20 mg to be taken 20 minutes before breakfast in addition to famotidine 40 mg in the evenings with as patient has had breakthrough symptoms while coming off of PPI.  No need for repeat EGD at this time.  No evidence of Carrero's esophagus on EGD performed by me in August 2020 and no evidence of Carrero's esophagus on EGD performed by Dr. William previously.  Continue to follow GERD diet.  Emphasized the importance of avoiding NSAIDs.  Monitor for any melena or hematochezia.  CBC notable for normal hemoglobin.        2.  History of tubular adenomas; last colonoscopy was in 2020 notable for 3 colon polyps, 1 of these polyps was tubular adenoma, was recommended repeat colonoscopy at 3-year interval.  Given age greater than 75, multiple comorbidities, patient would like to hold off on any invasive procedures at this time which I think is reasonable.  We discussed the possibility of missed lesions/tubular adenomas however patient would like to hold off.      ______________________________________________________________________    Reason for Consult / Principal Problem: [unfilled]    HPI: Natividad Ortiz is a 83 y.o. year old female with history of stage III CKD, hypertension, restless leg syndrome, A-fib on Xarelto, type 2 diabetes, aortic stenosis, planned for aortic valve replacement, chronic GERD, colon polyps, last colonoscopy being in August 2020 presents for new visit.  Patient reports that she has been feeling sick to her stomach.  Patient reports that she ran out of  her medications few months ago and since then has been having epigastric discomfort postprandially.  Patient reports that she does not not always follow GERD diet and occasionally eats chocolate, coffee which upset her stomach.  She denies any melena or hematochezia, no dysphagia, loss of appetite, early satiety.  She has been taking famotidine 40 mg in the evenings only.      Review of Systems:  The remainder of the review of systems was negative except for the pertinent positives noted in HPI.     Historical Information   Past Medical History:   Diagnosis Date    Arthritis     Atrial fibrillation (HCC)     Carrero's esophagus     last assessed: 1/23/2018    BRCA1 negative     BRCA2 negative     Breast cancer (HCC) 2006    stage 1 (left), given adjuvant radiation with Arimidex x 5 years    Cancer (HCC) 2006    Left Breast, Lumpectomy    Cataract     last assessed: 3/11/2014    Chronic diastolic CHF (congestive heart failure) (formerly [facility]) 11/21/2022    Dysplasia of toenail     last assessed: 8/29/2017    Esophageal reflux     GERD (gastroesophageal reflux disease)     Gross hematuria     last assessed: 2/19/2015    Hematuria     Hiatal hernia     History of radiation therapy     Hypertension     Irregular heart beat     AFIB    Mixed sensory-motor polyneuropathy     Neuropathy     Obesity     Pacemaker     Paroxysmal atrial fibrillation (HCC)     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    Peripheral neuropathy     Rectal bleeding     Restless leg syndrome     Shortness of breath     last assessed: 1/11/2016     Past Surgical History:   Procedure Laterality Date    BREAST BIOPSY Left 2006    BREAST LUMPECTOMY Left 2006    onset: 2006    BREAST SURGERY      CARDIAC CATHETERIZATION N/A 9/5/2023    Procedure: Cardiac RHC/LHC;  Surgeon: Patric England MD;  [facility];  Service: Cardiology    CARDIAC CATHETERIZATION N/A 9/5/2023    Procedure: Cardiac Coronary Angiogram;  Surgeon: Patric England MD;  [facility]:  [facility];  Service: Cardiology    CARDIAC CATHETERIZATION  2023    Procedure: Cardiac catheterization;  Surgeon: Patric England MD;  [facility];  Service: Cardiology    CARDIAC PACEMAKER PLACEMENT      x 3     CATARACT EXTRACTION      COLONOSCOPY      CYSTOSCOPY  2014    diagnostic    HYSTERECTOMY      ALISON BSO; due to fibroid uterus; age 42    KNEE CARTILAGE SURGERY      excision lesion of meniscus or capsule knee    KNEE SURGERY      OOPHORECTOMY Bilateral     age 42    OTHER SURGICAL HISTORY      radiation therapy    ID ARTHRP KNE CONDYLE&PLATU MEDIAL&LAT COMPARTMENTS Left 2020    Procedure: ARTHROPLASTY KNEE TOTAL;  Surgeon: Melquiades Sterling DO;  Location: [facility];  Service: Orthopedics    ID ARTHRP KNE CONDYLE&PLATU MEDIAL&LAT COMPARTMENTS Right 2022    Procedure: ARTHROPLASTY KNEE TOTAL W ROBOT - RIGHT;  Surgeon: Melquiades Sterling DO;  Location: [facility];  Service: Orthopedics    ID COLONOSCOPY FLX DX W/COLLJ SPEC WHEN PFRMD N/A 2017    Procedure: COLONOSCOPY;  Surgeon: Desean Ham MD;  [facility];  Service: Gastroenterology    ID ESOPHAGOGASTRODUODENOSCOPY TRANSORAL DIAGNOSTIC N/A 2017    Procedure: ESOPHAGOGASTRODUODENOSCOPY (EGD);  Surgeon: Jacob Morrell MD;  [facility];  Service: Gastroenterology    UPPER GASTROINTESTINAL ENDOSCOPY       Social History   Social History     Substance and Sexual Activity   Alcohol Use Not Currently     Social History     Substance and Sexual Activity   Drug Use No     Social History     Tobacco Use   Smoking Status Former    Current packs/day: 0.00    Average packs/day: 1 pack/day for 25.0 years (25.0 ttl pk-yrs)    Types: Cigarettes    Start date: 1955    Quit date: 1980    Years since quittin.3   Smokeless Tobacco Never   Tobacco Comments    Quit over 30 years ago; quit age 47     Family History   Problem Relation Age of Onset    Hypertension Mother     Heart disease Father   "   Aneurysm Father     Coronary artery disease Father         in his 70s with aneurysm    Aortic aneurysm Father         abdominal    Scleroderma Sister     Breast cancer Sister 68    Hypertension Sister     Cancer Sister     No Known Problems Son     No Known Problems Son     Testicular cancer Son 41    Thyroid cancer Son 38    Colon cancer Maternal Aunt     Colon cancer Maternal Aunt     Breast cancer Other 50        kaylee's daughter    Alcohol abuse Neg Hx     Substance Abuse Neg Hx     Mental illness Neg Hx     Depression Neg Hx        Meds/Allergies     (Not in a hospital admission)    No current facility-administered medications for this visit.       Allergies   Allergen Reactions    Latex      Added based on information entered during case entry, please review and add reactions, type, and severity as needed    Duloxetine Hcl Other (See Comments)     Facial pins and needles sensation    Erythromycin Rash    Levofloxacin Other (See Comments)     Muscular aches    Penicillins Rash    Savella [Milnacipran] Rash    Sulfa Antibiotics Rash       Objective     Blood pressure 116/83, pulse 82, height 5' 4\" (1.626 m), weight 103 kg (226 lb).    [unfilled]    PHYSICAL EXAM     GEN: well nourished, well developed, no acute distress  HEENT: anicteric, MMM  CV: Irregularly irregular  CHEST: CTA b/l, no WRR  ABD: +BS, soft, NT/ND  EXT: Bilateral lower extremity edema.  SKIN: no rashes,  NEURO: aaox3    Lab Results:   No visits with results within 1 Day(s) from this visit.   Latest known visit with results is:   Lab on 12/14/2023   Component Date Value    Sodium 12/14/2023 138     Potassium 12/14/2023 4.2     Chloride 12/14/2023 99     CO2 12/14/2023 33 (H)     ANION GAP 12/14/2023 6     BUN 12/14/2023 27 (H)     Creatinine 12/14/2023 0.95     Glucose, Fasting 12/14/2023 89     Calcium 12/14/2023 10.0     eGFR 12/14/2023 56      Imaging Studies: I have personally reviewed pertinent films in PACS                "

## 2024-01-09 NOTE — TELEPHONE ENCOUNTER
Appointment Change  Cancel, Reschedule, Change to Virtual      Who are you speaking with? Patient   If it is not the patient, is the caller listed on the communication consent form? N/A   Which provider is the appointment scheduled with? Dr. Juarez   When was the original appointment scheduled?    Please list date and time 01/31/2024 @ 9:40AM    At which location is the appointment scheduled to take place? [facility]   Was the appointment rescheduled?     Was the appointment changed from an in person visit to a virtual visit?    If so, please list the details of the change. No.    What is the reason for the appointment change? Patient states she doesn't need this appt.

## 2024-01-10 ENCOUNTER — OFFICE VISIT (OUTPATIENT)
Dept: WOUND CARE | Facility: HOSPITAL | Age: 84
End: 2024-01-10
Payer: MEDICARE

## 2024-01-10 VITALS
SYSTOLIC BLOOD PRESSURE: 119 MMHG | RESPIRATION RATE: 18 BRPM | DIASTOLIC BLOOD PRESSURE: 82 MMHG | TEMPERATURE: 96.5 F | HEART RATE: 97 BPM

## 2024-01-10 DIAGNOSIS — S81.801A TRAUMATIC OPEN WOUND OF RIGHT LOWER LEG, INITIAL ENCOUNTER: Primary | ICD-10-CM

## 2024-01-10 DIAGNOSIS — I89.0 LYMPHEDEMA OF BOTH LOWER EXTREMITIES: ICD-10-CM

## 2024-01-10 DIAGNOSIS — S81.802A TRAUMATIC OPEN WOUND OF LEFT LOWER LEG, INITIAL ENCOUNTER: ICD-10-CM

## 2024-01-10 DIAGNOSIS — S81.812A SKIN TEAR OF LEFT LOWER LEG WITHOUT COMPLICATION, INITIAL ENCOUNTER: ICD-10-CM

## 2024-01-10 PROCEDURE — 99213 OFFICE O/P EST LOW 20 MIN: CPT | Performed by: STUDENT IN AN ORGANIZED HEALTH CARE EDUCATION/TRAINING PROGRAM

## 2024-01-10 PROCEDURE — 97597 DBRDMT OPN WND 1ST 20 CM/<: CPT | Performed by: STUDENT IN AN ORGANIZED HEALTH CARE EDUCATION/TRAINING PROGRAM

## 2024-01-10 RX ORDER — LIDOCAINE HYDROCHLORIDE 40 MG/ML
5 SOLUTION TOPICAL ONCE
Status: COMPLETED | OUTPATIENT
Start: 2024-01-10 | End: 2024-01-10

## 2024-01-10 RX ADMIN — LIDOCAINE HYDROCHLORIDE 5 ML: 40 SOLUTION TOPICAL at 10:50

## 2024-01-10 NOTE — PROGRESS NOTES
"Patient ID: Natividad Ortiz is a 83 y.o. female Date of Birth 1940     Chief Complaint  Chief Complaint   Patient presents with    Follow Up Wound Care Visit       Allergies  Latex, Duloxetine hcl, Erythromycin, Levofloxacin, Penicillins, Savella [milnacipran], and Sulfa antibiotics    Assessment:     Diagnoses and all orders for this visit:    Traumatic open wound of right lower leg, initial encounter  -     lidocaine (XYLOCAINE) 4 % topical solution 5 mL  -     Wound cleansing and dressings; Future  -     Wound compression and edema control; Future    Skin tear of left lower leg without complication, initial encounter  -     Debridement    Traumatic open wound of left lower leg, initial encounter  -     lidocaine (XYLOCAINE) 4 % topical solution 5 mL  -     Wound cleansing and dressings; Future  -     Wound compression and edema control; Future    Lymphedema of both lower extremities  -     lidocaine (XYLOCAINE) 4 % topical solution 5 mL  -     Wound cleansing and dressings; Future  -     Wound compression and edema control; Future              Debridement   Wound 01/10/24 Skin Tear Leg Left;Anterior    Universal Protocol:  Consent: Verbal consent obtained.  Risks and benefits: risks, benefits and alternatives were discussed  Consent given by: patient  Time out: Immediately prior to procedure a \"time out\" was called to verify the correct patient, procedure, equipment, support staff and site/side marked as required.  Patient identity confirmed: verbally with patient    Debridement Details  Performed by: physician  Debridement type: selective  Pain control: lidocaine 4%      Post-debridement measurements  Length (cm): 3  Width (cm): 0.2  Depth (cm): 0.1  Percent debrided: 90%  Surface Area (cm^2): 0.6  Area Debrided (cm^2): 0.54  Volume (cm^3): 0.06    Devitalized tissue debrided: biofilm and exudate  Instrument(s) utilized: curette  Bleeding: small  Hemostasis obtained with: pressure  Procedural pain " (0-10): 1  Post-procedural pain: 0   Response to treatment: procedure was tolerated well        Plan:  It was a pleasure to see Natividad Ortiz for wound care follow up today  Selective debridement performed today as above  All wounds from last week are fully epithelialized, however patient has 2 new skin tears from using tape on the leg.  At last visit we did talk about using Tubifast to keep dressings in place overnight and not using tape due to potential skin breakdown.  Patient states that she continues to use tape and bordered gauze.  Continue plan of care as noted below with Dermagran, rolled gauze, to be fast   A1C results reviewed with the patient today.   No signs or symptoms of infection today. Patient understands that if any signs of infection start (such as increased redness, drainage, pain, fever, chills, diaphoresis), they should call our office or proceed to the ER or Urgent Care.  Patient should continue a high protein diet to facilitate wound healing  Patient is advised to not submerge wound or leave wound open to air.  Follow up in 1 weeks  Given the multi-factorial nature of wound care, additional time was taken to review patient's treatment plan with other specialties and most recent pertinent lab work and imaging.   All plans of care discussed with patient at bedside who verbalized understanding with treatment plan.       Wound 09/19/23 Traumatic Leg Left;Lateral (Active)   Wound Image Images linked 01/10/24 1028   Wound Description Dry 01/10/24 1028   Dorys-wound Assessment Hyperpigmented;Edema 01/10/24 1028   Wound Length (cm) 0 cm 01/10/24 1028   Wound Width (cm) 0 cm 01/10/24 1028   Wound Depth (cm) 0 cm 01/10/24 1028   Wound Surface Area (cm^2) 0 cm^2 01/10/24 1028   Wound Volume (cm^3) 0 cm^3 01/10/24 1028   Calculated Wound Volume (cm^3) 0 cm^3 01/10/24 1028   Change in Wound Size % 100 01/10/24 1028   Drainage Amount None 01/10/24 1028       Wound 09/26/23 Traumatic Leg Right;Anterior  (Active)   Wound Image Images linked 01/10/24 1027   Wound Description Dry 01/10/24 1027   Dorys-wound Assessment Dry;Hyperpigmented;Edema;Pink 01/10/24 1027   Wound Length (cm) 0 cm 01/10/24 1027   Wound Width (cm) 0 cm 01/10/24 1027   Wound Depth (cm) 0 cm 01/10/24 1027   Wound Surface Area (cm^2) 0 cm^2 01/10/24 1027   Wound Volume (cm^3) 0 cm^3 01/10/24 1027   Calculated Wound Volume (cm^3) 0 cm^3 01/10/24 1027   Change in Wound Size % 100 01/10/24 1027   Drainage Amount None 01/10/24 1027       Wound 01/10/24 Skin Tear Leg Left;Anterior (Active)   Wound Image Images linked 01/10/24 1029   Wound Description Beefy red;Epithelialization 01/10/24 1029   Dorys-wound Assessment Edema;Hyperpigmented 01/10/24 1029   Wound Length (cm) 3 cm 01/10/24 1029   Wound Width (cm) 0.2 cm 01/10/24 1029   Wound Depth (cm) 0.1 cm 01/10/24 1029   Wound Surface Area (cm^2) 0.6 cm^2 01/10/24 1029   Wound Volume (cm^3) 0.06 cm^3 01/10/24 1029   Calculated Wound Volume (cm^3) 0.06 cm^3 01/10/24 1029   Drainage Amount Small 01/10/24 1029   Drainage Description Bloody 01/10/24 1029   Non-staged Wound Description Full thickness 01/10/24 1029       Wound 09/19/23 Traumatic Leg Left;Lateral (Active)   Date First Assessed/Time First Assessed: 09/19/23 0938   Primary Wound Type: Traumatic  Location: Leg  Wound Location Orientation: Left;Lateral       Wound 09/26/23 Traumatic Leg Right;Anterior (Active)   Date First Assessed/Time First Assessed: 09/26/23 0950   Primary Wound Type: Traumatic  Location: Leg  Wound Location Orientation: Right;Anterior       Wound 01/10/24 Skin Tear Leg Left;Anterior (Active)   Date First Assessed/Time First Assessed: 01/10/24 1028   Primary Wound Type: Skin Tear  Location: Leg  Wound Location Orientation: Left;Anterior       [REMOVED] Wound 08/17/20 Knee Left (Removed)   Resolved Date: 09/19/23  Date First Assessed/Time First Assessed: 08/17/20 0936   Location: Knee  Wound Location Orientation: Left  Wound  Description (Comments): darryl stocking  Incision's 1st Dressing: ADHESIVE SKIN CLOSR DERMABOND PRINEO, DRESSING MEP...       [REMOVED] Wound 03/28/22 Knee Right (Removed)   Resolved Date: 09/19/23  Date First Assessed/Time First Assessed: 03/28/22 0854   Location: Knee  Wound Location Orientation: Right  Wound Description (Comments): DARRYL stockings applied to bilateral lower legs  Incision's 1st Dressing: ADHESIVE SKIN HI...       [REMOVED] Wound 04/11/22 Surgical Knee Right (Removed)   Resolved Date: 09/19/23  Date First Assessed/Time First Assessed: 04/11/22 2000   Primary Wound Type: Surgical  Location: Knee  Wound Location Orientation: Right  Wound Outcome: Unknown (No longer present)       [REMOVED] Wound 09/05/23 Catheter entry/exit site Wrist Right (Removed)   Resolved Date: 09/19/23  Date First Assessed/Time First Assessed: 09/05/23 0921   Traumatic Wound Type: Catheter entry/exit site  Location: Wrist  Wound Location Orientation: Right  Wound Outcome: Unknown (No longer present)       [REMOVED] Wound 09/05/23 Skin Tear Pretibial Left;Outer (Removed)   Resolved Date: 09/19/23  Date First Assessed/Time First Assessed: 09/05/23 0730   Present on Original Admission: Yes  Primary Wound Type: Skin Tear  Location: Pretibial  Wound Location Orientation: Left;Outer  Wound Outcome: (c) Other (Comment)       Subjective:      .    1/10/23: Patient notes that there is no drainage to her original wounds but noticed opening of new wounds directly next to these.  States that she is still using bordered gauze.    1/3/24: Applying wound dressing as directed.  Believes wounds are healed today.  Happy with wound healing.  No symptoms of infection.  Patient does note today that she has been using bordered gauze on the left lower extremity wound that it may have caused changes in the skin to the periwound.     12/20/23, 12/13/23: VNA coming to the home and applying collagen as directed.  Patient happy with wound healing.  Denies  any symptoms of infection today.     12/6/23: Since last visit patient was hospitalized for cellulitis and elevated INR.  She was treated inpatient with vancomycin and discharged on cefadroxil.  Notably it was noted that patient had coagulopathy while on Xarelto.  During her hospitalization oncology was consulted and the decision was made to stop Xarelto until further evaluation is done of patient's coagulopathy.      11/29/23, 11/22/23: Happy with wound healing. No symptoms of infection.     11/15/23: Patient is happy with wound healing.  Note skin tear on lateral left lower extremity.  No symptoms of infection.     11/8/23: Patient is happy with wound healing.  Notes that there is less drainage from the wound.  No symptoms of infection today.     11/1/23, 10/24/23: Patient happy with wound healing.  Wounds are looking better today.  No symptoms of infection.     10/10/23: Wound care being performed by patient's son and VNA who are coming twice per week.  Patient notes that she did have increased leg swelling since her last visit.  Overall happy with wound healing     9/26/23: Dressing changes from VNA and her son.  Denies any local or systemic symptoms of infection today.  Happy with wound healing.  Has been elevating legs.     9/19/23: Consult - Natividad is a pleasant 82-year-old female with a past medical history of type 2 diabetes mellitus most recent A1c 5.8% 1 month ago, diabetic polyneuropathy, atrial  fibrillation on Xarelto, obesity, history of heart failure with most recent echocardiogram showing normal function here today for initial wound care consult.  Per chart review patient sustained the wound of her left lower extremity on August 21, 2023 when she closed a car door on her leg.  She went the next day to her PCP and was advised to apply warm compresses.  She has been seen in the ED multiple times for wound checks.  Initially she has been put on clindamycin and given Tdap vaccination. Wound culture  taken which grew 1+ staph coag negative, and later wound culture grew 1+ Staph epidermidis during inpatient stay.  She was admitted for 3 days from 9/5 to 9/8/2023 at [facility] and underwent bedside I&D with placement of wound VAC by podiatry.  Arterial duplex showed no significant arterial disease however PVR tracings were dampened.  Great toe pressure  within healing range.  Blood cultures were negative.  She is instructed to follow-up outpatient with podiatry and VNA was set up for patient.  Recently patient was seen again in ER on September 15, 2023 when there was concern again for infection of the wound.  She was prescribed clindamycin and referred to wound management.  Referral was placed by physician assistant Susi Horn.  Today patient continues to take clindamycin as directed.  Denies any local or systemic signs of infection including fever chills diaphoresis.     9/5/23: LEADs  RIGHT LOWER LIMB:  No evidence of significant lower extremity arterial occlusive disease.  Ankle/Brachial index: 2.11 which is unreliable due to non compressible vessels.  PVR/ PPG tracings are dampened.  Metatarsal pressure of 109 mmHg  Great toe pressure of 81 mmHg, within the healing range.     LEFT LOWER LIMB:  No evidence of significant lower extremity arterial occlusive disease.  Ankle/Brachial index: 2.11 which is unreliable due to non compressible vessels.  PVR/ PPG tracings are dampened.  Metatarsal pressure of 152 mmHg  Great toe pressure of 92 mmHg, within the healing range.                The following portions of the patient's history were reviewed and updated as appropriate: allergies, current medications, past family history, past medical history, past social history, past surgical history, and problem list.    Review of Systems   Constitutional:  Negative for chills, diaphoresis, fatigue and fever.   Skin:  Positive for wound.   All other systems reviewed and are negative.         Objective:       Wound 09/19/23 Traumatic Leg Left;Lateral (Active)   Wound Image Images linked 01/10/24 1028   Wound Description Dry 01/10/24 1028   Dorys-wound Assessment Hyperpigmented;Edema 01/10/24 1028   Wound Length (cm) 0 cm 01/10/24 1028   Wound Width (cm) 0 cm 01/10/24 1028   Wound Depth (cm) 0 cm 01/10/24 1028   Wound Surface Area (cm^2) 0 cm^2 01/10/24 1028   Wound Volume (cm^3) 0 cm^3 01/10/24 1028   Calculated Wound Volume (cm^3) 0 cm^3 01/10/24 1028   Change in Wound Size % 100 01/10/24 1028   Drainage Amount None 01/10/24 1028       Wound 09/26/23 Traumatic Leg Right;Anterior (Active)   Wound Image Images linked 01/10/24 1027   Wound Description Dry 01/10/24 1027   Dorys-wound Assessment Dry;Hyperpigmented;Edema;Pink 01/10/24 1027   Wound Length (cm) 0 cm 01/10/24 1027   Wound Width (cm) 0 cm 01/10/24 1027   Wound Depth (cm) 0 cm 01/10/24 1027   Wound Surface Area (cm^2) 0 cm^2 01/10/24 1027   Wound Volume (cm^3) 0 cm^3 01/10/24 1027   Calculated Wound Volume (cm^3) 0 cm^3 01/10/24 1027   Change in Wound Size % 100 01/10/24 1027   Drainage Amount None 01/10/24 1027       Wound 01/10/24 Skin Tear Leg Left;Anterior (Active)   Wound Image Images linked 01/10/24 1029   Wound Description Beefy red;Epithelialization 01/10/24 1029   Dorys-wound Assessment Edema;Hyperpigmented 01/10/24 1029   Wound Length (cm) 3 cm 01/10/24 1029   Wound Width (cm) 0.2 cm 01/10/24 1029   Wound Depth (cm) 0.1 cm 01/10/24 1029   Wound Surface Area (cm^2) 0.6 cm^2 01/10/24 1029   Wound Volume (cm^3) 0.06 cm^3 01/10/24 1029   Calculated Wound Volume (cm^3) 0.06 cm^3 01/10/24 1029   Drainage Amount Small 01/10/24 1029   Drainage Description Bloody 01/10/24 1029   Non-staged Wound Description Full thickness 01/10/24 1029       /82   Pulse 97   Temp (!) 96.5 °F (35.8 °C)   Resp 18     Physical Exam  Vitals reviewed.   Constitutional:       Appearance: Normal appearance.   HENT:      Head: Normocephalic and atraumatic.       Mouth/Throat:      Mouth: Mucous membranes are moist.   Eyes:      Extraocular Movements: Extraocular movements intact.   Pulmonary:      Effort: Pulmonary effort is normal.   Skin:     Comments: Lower extremity wounds from last exam are fully epithelialized with no open wound or exudate.  Directly anterior to these wounds there are 2 small openings that appear to be skin tears.  No signs of infection.  Serosanguineous exudate.     Neurological:      Mental Status: She is alert.   Psychiatric:         Mood and Affect: Mood normal.         Behavior: Behavior normal.                         Wound Instructions:  Orders Placed This Encounter   Procedures    Wound cleansing and dressings     Left leg wound:     Washed with soap and water.  Rinsed with normal saline  Shower: Yes  Apply Dermagran to the wound .  Cover with gauze.  Secure with rolled gauze and tape.     Dressing to be changed three times per week.        This was done today.     Standing Status:   Future     Standing Expiration Date:   1/10/2025    Wound compression and edema control     Wound compression and edema control        Elastic Tubular Stocking: Spandagrip G to right and left lower legs     Tubular elastic bandage: Apply from base of toes to behind the knee. Apply in AM, may remove for sleep.     Avoid prolonged standing in one place.     Elevate leg(s) above the level of the heart when sitting or as much as possible.                          ·     Standing Status:   Future     Standing Expiration Date:   1/10/2025    Debridement     This order was created via procedure documentation        Diagnosis ICD-10-CM Associated Orders   1. Traumatic open wound of right lower leg, initial encounter  S81.801A lidocaine (XYLOCAINE) 4 % topical solution 5 mL     Wound cleansing and dressings     Wound compression and edema control      2. Skin tear of left lower leg without complication, initial encounter  S81.812A Debridement      3. Traumatic open wound of  "left lower leg, initial encounter  S81.802A lidocaine (XYLOCAINE) 4 % topical solution 5 mL     Wound cleansing and dressings     Wound compression and edema control      4. Lymphedema of both lower extremities  I89.0 lidocaine (XYLOCAINE) 4 % topical solution 5 mL     Wound cleansing and dressings     Wound compression and edema control          --  Talia Damico MD    \"This note has been constructed using a voice recognition system. Therefore there may be syntax, spelling, and/or grammatical errors. Occasional wrong word or \"sound alike\" substitutions may have occurred due to the inherent limitations of voice recognition software. Read the chart carefully and recognize, using context, where substitutions have occurred. Please call if you have any questions.\"     "

## 2024-01-10 NOTE — PATIENT INSTRUCTIONS
Orders Placed This Encounter   Procedures    Wound cleansing and dressings     Left leg wound:     Washed with soap and water.  Rinsed with normal saline  Shower: Yes  Apply Dermagran to the wound .  Cover with gauze.  Secure with rolled gauze and tape.     Dressing to be changed three times per week.        This was done today.     Standing Status:   Future     Standing Expiration Date:   1/10/2025    Wound compression and edema control     Wound compression and edema control        Elastic Tubular Stocking: Spandagrip G to right and left lower legs     Tubular elastic bandage: Apply from base of toes to behind the knee. Apply in AM, may remove for sleep.     Avoid prolonged standing in one place.     Elevate leg(s) above the level of the heart when sitting or as much as possible.                          ·     Standing Status:   Future     Standing Expiration Date:   1/10/2025

## 2024-01-18 ENCOUNTER — OFFICE VISIT (OUTPATIENT)
Dept: WOUND CARE | Facility: HOSPITAL | Age: 84
End: 2024-01-18
Payer: MEDICARE

## 2024-01-18 VITALS
RESPIRATION RATE: 15 BRPM | TEMPERATURE: 97.6 F | DIASTOLIC BLOOD PRESSURE: 78 MMHG | HEART RATE: 71 BPM | SYSTOLIC BLOOD PRESSURE: 142 MMHG

## 2024-01-18 DIAGNOSIS — S81.812A SKIN TEAR OF LEFT LOWER LEG WITHOUT COMPLICATION, INITIAL ENCOUNTER: Primary | ICD-10-CM

## 2024-01-18 PROCEDURE — 97597 DBRDMT OPN WND 1ST 20 CM/<: CPT | Performed by: STUDENT IN AN ORGANIZED HEALTH CARE EDUCATION/TRAINING PROGRAM

## 2024-01-18 NOTE — PATIENT INSTRUCTIONS
Orders Placed This Encounter   Procedures    Wound cleansing and dressings Skin Tear Left;Anterior Leg     Left leg wound:     Washed with soap and water.  Rinse with normal saline  Shower: Yes  Apply Dermagran to the wound .  Cover with gauze.  Secure with rolled gauze and tape.     Dressing to be changed three times per week.        This was done today.     Standing Status:   Future     Standing Expiration Date:   1/18/2025    Wound compression and edema control Skin Tear Left;Anterior Leg     Wound compression and edema control        Elastic Tubular Stocking: Spandagrip G to right and left lower legs     Tubular elastic bandage: Apply from base of toes to behind the knee. Apply in AM, may remove for sleep.     Avoid prolonged standing in one place.     Elevate leg(s) above the level of the heart when sitting or as much as possible.                          ·     Standing Status:   Future     Standing Expiration Date:   1/18/2025

## 2024-01-19 NOTE — PROGRESS NOTES
"Patient ID: Natividad Ortiz is a 83 y.o. female Date of Birth 1940     Chief Complaint  Chief Complaint   Patient presents with    Follow Up Wound Care Visit     LLE       Allergies  Latex, Duloxetine hcl, Erythromycin, Levofloxacin, Penicillins, Savella [milnacipran], and Sulfa antibiotics    Assessment:     Diagnoses and all orders for this visit:    Skin tear of left lower leg without complication, initial encounter  -     Wound cleansing and dressings Skin Tear Left;Anterior Leg; Future  -     Wound compression and edema control Skin Tear Left;Anterior Leg; Future  -     Debridement              Debridement   Wound 01/10/24 Skin Tear Leg Left;Anterior    Universal Protocol:  Consent: Verbal consent obtained.  Risks and benefits: risks, benefits and alternatives were discussed  Consent given by: patient  Time out: Immediately prior to procedure a \"time out\" was called to verify the correct patient, procedure, equipment, support staff and site/side marked as required.  Patient identity confirmed: verbally with patient    Debridement Details  Performed by: physician  Debridement type: selective  Pain control: lidocaine 4%      Post-debridement measurements  Length (cm): 3  Width (cm): 0.2  Depth (cm): 0.1  Percent debrided: 90%  Surface Area (cm^2): 0.6  Area Debrided (cm^2): 0.54  Volume (cm^3): 0.06    Devitalized tissue debrided: biofilm and exudate  Instrument(s) utilized: curette  Bleeding: small  Hemostasis obtained with: pressure  Procedural pain (0-10): 1  Post-procedural pain: 0   Response to treatment: procedure was tolerated well        Plan:  It was a pleasure to see Natividad Ortiz for wound care follow up today  Selective debridement performed today as above  Wound is improving   Continue plan of care as noted below with dermagran  No signs or symptoms of infection today. Patient understands that if any signs of infection start (such as increased redness, drainage, pain, fever, chills, " diaphoresis), they should call our office or proceed to the ER or Urgent Care.  Patient should continue a high protein diet to facilitate wound healing  Patient is advised to not submerge wound or leave wound open to air.  Follow up in 1 weeks  Given the multi-factorial nature of wound care, additional time was taken to review patient's treatment plan with other specialties and most recent pertinent lab work and imaging.   All plans of care discussed with patient at bedside who verbalized understanding with treatment plan.       Wound 01/10/24 Skin Tear Leg Left;Anterior (Active)   Wound Image Images linked 01/18/24 1336   Wound Description Brown;Other (Comment);Epithelialization (scab) 01/18/24 1336   Dorys-wound Assessment Edema;Hyperpigmented 01/18/24 1336   Wound Length (cm) 3 cm 01/18/24 1336   Wound Width (cm) 0.2 cm 01/18/24 1336   Wound Depth (cm) 0 cm 01/18/24 1336   Wound Surface Area (cm^2) 0.6 cm^2 01/18/24 1336   Wound Volume (cm^3) 0 cm^3 01/18/24 1336   Calculated Wound Volume (cm^3) 0 cm^3 01/18/24 1336   Change in Wound Size % 100 01/18/24 1336   Drainage Amount None 01/18/24 1336   Non-staged Wound Description Full thickness 01/18/24 1336   Dressing Status Intact (upon arrival) 01/18/24 1336       Wound 01/10/24 Skin Tear Leg Left;Anterior (Active)   Date First Assessed/Time First Assessed: 01/10/24 1028   Primary Wound Type: Skin Tear  Location: Leg  Wound Location Orientation: Left;Anterior       [REMOVED] Wound 08/17/20 Knee Left (Removed)   Resolved Date: 09/19/23  Date First Assessed/Time First Assessed: 08/17/20 0936   Location: Knee  Wound Location Orientation: Left  Wound Description (Comments): soy stocking  Incision's 1st Dressing: ADHESIVE SKIN CLOSR DERMABOND JAKY, DRESSING MEP...       [REMOVED] Wound 03/28/22 Knee Right (Removed)   Resolved Date: 09/19/23  Date First Assessed/Time First Assessed: 03/28/22 0854   Location: Knee  Wound Location Orientation: Right  Wound Description  (Comments): DARRYL stockings applied to bilateral lower legs  Incision's 1st Dressing: ADHESIVE SKIN HI...       [REMOVED] Wound 04/11/22 Surgical Knee Right (Removed)   Resolved Date: 09/19/23  Date First Assessed/Time First Assessed: 04/11/22 2000   Primary Wound Type: Surgical  Location: Knee  Wound Location Orientation: Right  Wound Outcome: Unknown (No longer present)       [REMOVED] Wound 09/05/23 Catheter entry/exit site Wrist Right (Removed)   Resolved Date: 09/19/23  Date First Assessed/Time First Assessed: 09/05/23 0921   Traumatic Wound Type: Catheter entry/exit site  Location: Wrist  Wound Location Orientation: Right  Wound Outcome: Unknown (No longer present)       [REMOVED] Wound 09/05/23 Skin Tear Pretibial Left;Outer (Removed)   Resolved Date: 09/19/23  Date First Assessed/Time First Assessed: 09/05/23 0730   Present on Original Admission: Yes  Primary Wound Type: Skin Tear  Location: Pretibial  Wound Location Orientation: Left;Outer  Wound Outcome: (c) Other (Comment)       [REMOVED] Wound 09/19/23 Traumatic Leg Left;Lateral (Removed)   Resolved Date: 01/10/24  Date First Assessed/Time First Assessed: 09/19/23 0938   Primary Wound Type: Traumatic  Location: Leg  Wound Location Orientation: Left;Lateral  Wound Outcome: Healed       [REMOVED] Wound 09/26/23 Traumatic Leg Right;Anterior (Removed)   Resolved Date: 01/10/24  Date First Assessed/Time First Assessed: 09/26/23 0950   Primary Wound Type: Traumatic  Location: Leg  Wound Location Orientation: Right;Anterior  Wound Outcome: Healed       Subjective:      .    1/19/24: Applying Dermagran as directed.  Happy with wound healing.    1/10/23: Patient notes that there is no drainage to her original wounds but noticed opening of new wounds directly next to these.  States that she is still using bordered gauze.     1/3/24: Applying wound dressing as directed.  Believes wounds are healed today.  Happy with wound healing.  No symptoms of infection.  Patient  does note today that she has been using bordered gauze on the left lower extremity wound that it may have caused changes in the skin to the periwound.     12/20/23, 12/13/23: VNA coming to the home and applying collagen as directed.  Patient happy with wound healing.  Denies any symptoms of infection today.     12/6/23: Since last visit patient was hospitalized for cellulitis and elevated INR.  She was treated inpatient with vancomycin and discharged on cefadroxil.  Notably it was noted that patient had coagulopathy while on Xarelto.  During her hospitalization oncology was consulted and the decision was made to stop Xarelto until further evaluation is done of patient's coagulopathy.      11/29/23, 11/22/23: Happy with wound healing. No symptoms of infection.     11/15/23: Patient is happy with wound healing.  Note skin tear on lateral left lower extremity.  No symptoms of infection.     11/8/23: Patient is happy with wound healing.  Notes that there is less drainage from the wound.  No symptoms of infection today.     11/1/23, 10/24/23: Patient happy with wound healing.  Wounds are looking better today.  No symptoms of infection.     10/10/23: Wound care being performed by patient's son and VNA who are coming twice per week.  Patient notes that she did have increased leg swelling since her last visit.  Overall happy with wound healing     9/26/23: Dressing changes from VNA and her son.  Denies any local or systemic symptoms of infection today.  Happy with wound healing.  Has been elevating legs.     9/19/23: Consult - Natividad is a pleasant 82-year-old female with a past medical history of type 2 diabetes mellitus most recent A1c 5.8% 1 month ago, diabetic polyneuropathy, atrial  fibrillation on Xarelto, obesity, history of heart failure with most recent echocardiogram showing normal function here today for initial wound care consult.  Per chart review patient sustained the wound of her left lower extremity on  August 21, 2023 when she closed a car door on her leg.  She went the next day to her PCP and was advised to apply warm compresses.  She has been seen in the ED multiple times for wound checks.  Initially she has been put on clindamycin and given Tdap vaccination. Wound culture taken which grew 1+ staph coag negative, and later wound culture grew 1+ Staph epidermidis during inpatient stay.  She was admitted for 3 days from 9/5 to 9/8/2023 at [facility] and underwent bedside I&D with placement of wound VAC by podiatry.  Arterial duplex showed no significant arterial disease however PVR tracings were dampened.  Great toe pressure  within healing range.  Blood cultures were negative.  She is instructed to follow-up outpatient with podiatry and VNA was set up for patient.  Recently patient was seen again in ER on September 15, 2023 when there was concern again for infection of the wound.  She was prescribed clindamycin and referred to wound management.  Referral was placed by physician assistant Susi Horn.  Today patient continues to take clindamycin as directed.  Denies any local or systemic signs of infection including fever chills diaphoresis.     9/5/23: LEADs  RIGHT LOWER LIMB:  No evidence of significant lower extremity arterial occlusive disease.  Ankle/Brachial index: 2.11 which is unreliable due to non compressible vessels.  PVR/ PPG tracings are dampened.  Metatarsal pressure of 109 mmHg  Great toe pressure of 81 mmHg, within the healing range.     LEFT LOWER LIMB:  No evidence of significant lower extremity arterial occlusive disease.  Ankle/Brachial index: 2.11 which is unreliable due to non compressible vessels.  PVR/ PPG tracings are dampened.  Metatarsal pressure of 152 mmHg  Great toe pressure of 92 mmHg, within the healing range.             The following portions of the patient's history were reviewed and updated as appropriate: allergies, current medications, past family  history, past medical history, past social history, past surgical history, and problem list.    Review of Systems   Constitutional:  Negative for chills, diaphoresis, fatigue and fever.   Skin:  Positive for wound.   All other systems reviewed and are negative.        Objective:       Wound 01/10/24 Skin Tear Leg Left;Anterior (Active)   Wound Image Images linked 01/18/24 1336   Wound Description Brown;Other (Comment);Epithelialization (scab) 01/18/24 1336   Dorys-wound Assessment Edema;Hyperpigmented 01/18/24 1336   Wound Length (cm) 3 cm 01/18/24 1336   Wound Width (cm) 0.2 cm 01/18/24 1336   Wound Depth (cm) 0 cm 01/18/24 1336   Wound Surface Area (cm^2) 0.6 cm^2 01/18/24 1336   Wound Volume (cm^3) 0 cm^3 01/18/24 1336   Calculated Wound Volume (cm^3) 0 cm^3 01/18/24 1336   Change in Wound Size % 100 01/18/24 1336   Drainage Amount None 01/18/24 1336   Non-staged Wound Description Full thickness 01/18/24 1336   Dressing Status Intact (upon arrival) 01/18/24 1336       /78   Pulse 71   Temp 97.6 °F (36.4 °C)   Resp 15     Physical Exam  Vitals reviewed.   Constitutional:       Appearance: Normal appearance.   HENT:      Head: Normocephalic and atraumatic.      Mouth/Throat:      Mouth: Mucous membranes are moist.   Eyes:      Extraocular Movements: Extraocular movements intact.   Pulmonary:      Effort: Pulmonary effort is normal.   Skin:     Comments: 2 discrete wounds of lateral left lower extremity.  These are most fully epithelialized today.  Very small openings.  Wound beds are dry.  No signs of infection.   Neurological:      Mental Status: She is alert.   Psychiatric:         Mood and Affect: Mood normal.         Behavior: Behavior normal.           Wound Instructions:  Orders Placed This Encounter   Procedures    Wound cleansing and dressings Skin Tear Left;Anterior Leg     Left leg wound:     Washed with soap and water.  Rinse with normal saline  Shower: Yes  Apply Dermagran to the wound .  Cover  "with gauze.  Secure with rolled gauze and tape.     Dressing to be changed three times per week.        This was done today.     Standing Status:   Future     Standing Expiration Date:   1/18/2025    Wound compression and edema control Skin Tear Left;Anterior Leg     Wound compression and edema control        Elastic Tubular Stocking: Spandagrip G to right and left lower legs     Tubular elastic bandage: Apply from base of toes to behind the knee. Apply in AM, may remove for sleep.     Avoid prolonged standing in one place.     Elevate leg(s) above the level of the heart when sitting or as much as possible.                          ·     Standing Status:   Future     Standing Expiration Date:   1/18/2025    Debridement     This order was created via procedure documentation        Diagnosis ICD-10-CM Associated Orders   1. Skin tear of left lower leg without complication, initial encounter  S81.812A Wound cleansing and dressings Skin Tear Left;Anterior Leg     Wound compression and edema control Skin Tear Left;Anterior Leg     Debridement          --  Talia Damico MD    \"This note has been constructed using a voice recognition system. Therefore there may be syntax, spelling, and/or grammatical errors. Occasional wrong word or \"sound alike\" substitutions may have occurred due to the inherent limitations of voice recognition software. Read the chart carefully and recognize, using context, where substitutions have occurred. Please call if you have any questions.\"     "

## 2024-01-23 ENCOUNTER — OFFICE VISIT (OUTPATIENT)
Dept: CARDIOLOGY CLINIC | Facility: CLINIC | Age: 84
End: 2024-01-23
Payer: MEDICARE

## 2024-01-23 VITALS
WEIGHT: 227.7 LBS | HEIGHT: 64 IN | OXYGEN SATURATION: 95 % | SYSTOLIC BLOOD PRESSURE: 134 MMHG | DIASTOLIC BLOOD PRESSURE: 78 MMHG | HEART RATE: 122 BPM | BODY MASS INDEX: 38.87 KG/M2

## 2024-01-23 DIAGNOSIS — I48.19 PERSISTENT ATRIAL FIBRILLATION (HCC): Primary | ICD-10-CM

## 2024-01-23 DIAGNOSIS — I50.32 CHRONIC DIASTOLIC CHF (CONGESTIVE HEART FAILURE) (HCC): ICD-10-CM

## 2024-01-23 DIAGNOSIS — N18.31 STAGE 3A CHRONIC KIDNEY DISEASE (HCC): ICD-10-CM

## 2024-01-23 DIAGNOSIS — I87.311 CHRONIC VENOUS HYPERTENSION (IDIOPATHIC) WITH ULCER OF RIGHT LOWER EXTREMITY (CODE) (HCC): ICD-10-CM

## 2024-01-23 PROCEDURE — 93000 ELECTROCARDIOGRAM COMPLETE: CPT | Performed by: INTERNAL MEDICINE

## 2024-01-23 PROCEDURE — 99214 OFFICE O/P EST MOD 30 MIN: CPT | Performed by: INTERNAL MEDICINE

## 2024-01-23 NOTE — PROGRESS NOTES
Cardiology Follow Up    Natividad Ortiz  1940  [id number]  [facility]  [address]  [address]  [facility] [address] [phone]    1. Persistent atrial fibrillation (HCC)  POCT ECG          Interval History: Followup for atrial fibrillation.    Stable dyspnea on exertion.  Continues with the wound center and wounding healing has improved.  No chest pain.     Medical Problems       Problem List       Atrial fibrillation (HCC) [I48.91]    Overview Addendum 8/30/2023  7:55 AM by Sandy Gupta MD     Description: s/p 2 unsuccessful ablation, s/p 2 cardioversion last in 2010, s/p pacemaker. ; on anticoagulation followed by cardiology warfarin  Xarelto 7/23         Hiatal hernia    Acquired deformity of foot    Corns    Diabetic polyneuropathy associated with type 2 diabetes mellitus (HCC)    Overview Signed 3/12/2018 12:21 PM by Sandy Gupta MD     ?duloxetine             Lab Results   Component Value Date    HGBA1C 5.8 (H) 08/22/2023         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HCC)    Radiculopathy of lumbar region    Sensory polyneuropathy    RLS (restless legs syndrome)    Arthritis    Essential hypertension    Overview Signed 12/22/2020 12:51 PM by Sandy Gupta MD     lisinopril         Lichen sclerosus et atrophicus    Multiple lung nodules    Overview Addendum 6/22/2020 10:16 AM by Sandy Gupta MD     Stable, no further CT. Last CT 2016         Severe obesity (BMI 35.0-39.9) with comorbidity (HCC)    Osteopenia of multiple sites    Peripheral neuropathy    Overview Signed 3/10/2018  8:59 AM by Sandy Gupta MD     Transitioned From: Neuropathy         Vitamin D deficiency    Dense breast tissue on mammogram    Difficulty walking    Flat foot    Onychomycosis    Carrero's esophagus with dysplasia    Overview Signed 6/3/2021  8:14 AM by Sandy Gupta MD     EGD 7/20         Mild  cognitive impairment    Pacemaker    GERD (gastroesophageal reflux disease)    Chronic edema    Deep venous insufficiency    Colon polyps    Lichen sclerosus et atrophicus of the vulva    Status post total knee replacement using cement, left    Leg swelling    Seasonal allergies    Exotropia of right eye    Stage 3a chronic kidney disease (HCC)    Lab Results   Component Value Date    EGFR 56 12/14/2023    EGFR 46 12/04/2023    EGFR 64 12/03/2023    CREATININE 0.95 12/14/2023    CREATININE 1.13 12/04/2023    CREATININE 0.86 12/03/2023         Status post total knee replacement using cement, right    Coagulopathy (HCC)    Chronic diastolic CHF (congestive heart failure) (Formerly [facility])    Wt Readings from Last 3 Encounters:   01/23/24 103 kg (227 lb 11.2 oz)   01/09/24 103 kg (226 lb)   01/03/24 99.3 kg (219 lb)                 Aortic stenosis, severe    Chronic left shoulder pain    Non-healing wound of left lower extremity    Overview Signed 9/21/2023  1:17 PM by Sandy Gupta MD     Wound vac         Cellulitis of right leg        Past Medical History:   Diagnosis Date    Arthritis     Atrial fibrillation (HCC)     Carrero's esophagus     last assessed: 1/23/2018    BRCA1 negative     BRCA2 negative     Breast cancer (HCC) 2006    stage 1 (left), given adjuvant radiation with Arimidex x 5 years    Cancer (HCC) 2006    Left Breast, Lumpectomy    Cataract     last assessed: 3/11/2014    Chronic diastolic CHF (congestive heart failure) (Formerly [facility]) 11/21/2022    Dysplasia of toenail     last assessed: 8/29/2017    Esophageal reflux     GERD (gastroesophageal reflux disease)     Gross hematuria     last assessed: 2/19/2015    Hematuria     Hiatal hernia     History of radiation therapy     Hypertension     Irregular heart beat     AFIB    Mixed sensory-motor polyneuropathy     Neuropathy     Obesity     Pacemaker     Paroxysmal atrial fibrillation (HCC)     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    Peripheral neuropathy      Rectal bleeding     Restless leg syndrome     Shortness of breath     last assessed: 2016     Social History     Socioeconomic History    Marital status: /Civil Union     Spouse name: Not on file    Number of children: 3    Years of education: 12    Highest education level: High school graduate   Occupational History    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      Comment: retired   Tobacco Use    Smoking status: Former     Current packs/day: 0.00     Average packs/day: 1 pack/day for 25.0 years (25.0 ttl pk-yrs)     Types: Cigarettes     Start date: 1955     Quit date: 1980     Years since quittin.3    Smokeless tobacco: Never    Tobacco comments:     Quit over 30 years ago; quit age 47   Vaping Use    Vaping status: Never Used   Substance and Sexual Activity    Alcohol use: Not Currently    Drug use: No    Sexual activity: Not on file   Other Topics Concern    Not on file   Social History Narrative         Social Determinants of Health     Financial Resource Strain: Low Risk  (2022)    Overall Financial Resource Strain (CARDIA)     Difficulty of Paying Living Expenses: Not very hard   Food Insecurity: No Food Insecurity (2023)    Hunger Vital Sign     Worried About Running Out of Food in the Last Year: Never true     Ran Out of Food in the Last Year: Never true   Transportation Needs: No Transportation Needs (2024)    Received from [employer]    OASIS : Transportation     Lack of Transportation (Medical): No     Lack of Transportation (Non-Medical): No     Patient Unable or Declines to Respond: No   Physical Activity: Not on file   Stress: Not on file   Social Connections: Feeling Socially Integrated (2024)    Received from [employer]    OASIS : Social Isolation     Frequency of experiencing loneliness or isolation: Never   Intimate Partner Violence: Not on file   Housing Stability: Unknown (2023)    Housing Stability  Vital Sign     Unable to Pay for Housing in the Last Year: No     Number of Places Lived in the Last Year: Not on file     Unstable Housing in the Last Year: No      Family History   Problem Relation Age of Onset    Hypertension Mother     Heart disease Father     Aneurysm Father     Coronary artery disease Father         in his 70s with aneurysm    Aortic aneurysm Father         abdominal    Scleroderma Sister     Breast cancer Sister 68    Hypertension Sister     Cancer Sister     No Known Problems Son     No Known Problems Son     Testicular cancer Son 41    Thyroid cancer Son 38    Colon cancer Maternal Aunt     Colon cancer Maternal Aunt     Breast cancer Other 50        kaylee's daughter    Alcohol abuse Neg Hx     Substance Abuse Neg Hx     Mental illness Neg Hx     Depression Neg Hx      Past Surgical History:   Procedure Laterality Date    BREAST BIOPSY Left 2006    BREAST LUMPECTOMY Left 2006    onset: 2006    BREAST SURGERY      CARDIAC CATHETERIZATION N/A 9/5/2023    Procedure: Cardiac RHC/LHC;  Surgeon: Patric England MD;  Location: [facility];  Service: Cardiology    CARDIAC CATHETERIZATION N/A 9/5/2023    Procedure: Cardiac Coronary Angiogram;  Surgeon: Patric England MD;  [facility]: [facility];  Service: Cardiology    CARDIAC CATHETERIZATION  9/5/2023    Procedure: Cardiac catheterization;  Surgeon: Patric England MD;  Location: [facility];  Service: Cardiology    CARDIAC PACEMAKER PLACEMENT      x 3 2006    CATARACT EXTRACTION      COLONOSCOPY      CYSTOSCOPY  04/04/2014    diagnostic    HYSTERECTOMY      ALISON BSO; due to fibroid uterus; age 42    KNEE CARTILAGE SURGERY      excision lesion of meniscus or capsule knee    KNEE SURGERY      OOPHORECTOMY Bilateral     age 42    OTHER SURGICAL HISTORY      radiation therapy    KS ARTHRP KNE CONDYLE&PLATU MEDIAL&LAT COMPARTMENTS Left 08/17/2020    Procedure: ARTHROPLASTY KNEE TOTAL;  Surgeon: Melquiades Sterling DO;  Location: [facility];   Service: Orthopedics    CO ARTHRP KNE CONDYLE&PLATU MEDIAL&LAT COMPARTMENTS Right 03/28/2022    Procedure: ARTHROPLASTY KNEE TOTAL W ROBOT - RIGHT;  Surgeon: Melquiades Sterling DO;  Location: [facility];  Service: Orthopedics    CO COLONOSCOPY FLX DX W/COLLJ SPEC WHEN PFRMD N/A 02/08/2017    Procedure: COLONOSCOPY;  Surgeon: Desean Ham MD;  Location:  GI LAB;  Service: Gastroenterology    CO ESOPHAGOGASTRODUODENOSCOPY TRANSORAL DIAGNOSTIC N/A 09/20/2017    Procedure: ESOPHAGOGASTRODUODENOSCOPY (EGD);  Surgeon: Jacob Morrell MD;  Location:  GI LAB;  Service: Gastroenterology    UPPER GASTROINTESTINAL ENDOSCOPY         Current Outpatient Medications:     Calcium Acetate, Phos Binder, (CALCIUM ACETATE PO), Take by mouth daily  , Disp: , Rfl:     clobetasol (TEMOVATE) 0.05 % ointment, Apply once weekly to the affected area, Disp: 30 g, Rfl: 3    famotidine (PEPCID) 40 MG tablet, Take 1 tablet (40 mg total) by mouth every evening, Disp: 90 tablet, Rfl: 1    fluticasone (FLONASE) 50 mcg/act nasal spray, SPRAY 1 SPRAY INTO EACH NOSTRIL EVERY DAY, Disp: 48 mL, Rfl: 3    gabapentin (NEURONTIN) 800 mg tablet, TAKE 1 TABLET BY MOUTH FOUR TIMES A DAY, Disp: 360 tablet, Rfl: 1    magnesium 30 MG tablet, Take 30 mg by mouth in the morning, Disp: , Rfl:     metoprolol tartrate (LOPRESSOR) 50 mg tablet, Take 2 tablets in the a.m., 1 tablet in the p.m., Disp: , Rfl:     multivitamin (THERAGRAN) TABS, Take 1 tablet by mouth daily, Disp: , Rfl:     pantoprazole (PROTONIX) 20 mg tablet, Take 1 tablet (20 mg total) by mouth every morning, Disp: 90 tablet, Rfl: 2    pramipexole (MIRAPEX) 0.5 mg tablet, TAKE 2 FULL TABLETS TWICE A DAY AT 5:00 P.M. AND 10:00 P.M., Disp: 360 tablet, Rfl: 1    rivaroxaban (Xarelto) 20 mg tablet, Take 1 tablet (20 mg total) by mouth daily with breakfast, Disp: , Rfl:     torsemide (DEMADEX) 20 mg tablet, TAKE 1 TABLET BY MOUTH EVERY DAY, Disp: 90 tablet, Rfl: 3    ascorbic acid (VITAMIN C) 500 MG tablet,  "Take 500 mg by mouth 2 (two) times a day (Patient not taking: Reported on 1/3/2024), Disp: , Rfl:     loratadine (CLARITIN) 10 mg tablet, Take 10 mg by mouth daily (Patient not taking: Reported on 1/3/2024), Disp: , Rfl:   Allergies   Allergen Reactions    Latex      Added based on information entered during case entry, please review and add reactions, type, and severity as needed    Duloxetine Hcl Other (See Comments)     Facial pins and needles sensation    Erythromycin Rash    Levofloxacin Other (See Comments)     Muscular aches    Penicillins Rash    Savella [Milnacipran] Rash    Sulfa Antibiotics Rash       Labs:     Chemistry        Component Value Date/Time     11/09/2015 1115    K 4.2 12/14/2023 1339    K 4.5 11/09/2015 1115    CL 99 12/14/2023 1339     11/09/2015 1115    CO2 33 (H) 12/14/2023 1339    CO2 30 (H) 11/09/2015 1115    BUN 27 (H) 12/14/2023 1339    BUN 20 11/09/2015 1115    CREATININE 0.95 12/14/2023 1339    CREATININE 0.8 11/09/2015 1115        Component Value Date/Time    CALCIUM 10.0 12/14/2023 1339    CALCIUM 9.0 11/09/2015 1115    ALKPHOS 64 12/04/2023 0426    ALKPHOS 61 11/09/2015 1115    AST 19 12/04/2023 0426    AST 23 11/09/2015 1115    ALT 12 12/04/2023 0426    ALT 31 11/09/2015 1115    BILITOT 0.5 11/09/2015 1115            Lab Results   Component Value Date    CHOL 162 11/09/2015     Lab Results   Component Value Date    HDL 38 (L) 08/22/2023    HDL 35 (L) 11/09/2022    HDL 45 10/25/2021     Lab Results   Component Value Date    LDLCALC 59 08/22/2023    LDLCALC 53 11/09/2022    LDLCALC 80 10/25/2021     Lab Results   Component Value Date    TRIG 83 08/22/2023    TRIG 94 11/09/2022    TRIG 69 10/25/2021     No results found for: \"CHOLHDL\"    Imaging: No results found.    EKG: Atrial Fibrillation .    Review of Systems   Constitutional: Negative.   HENT: Negative.     Eyes: Negative.    Cardiovascular: Negative.    Respiratory: Negative.     Endocrine: Negative.  "   Hematologic/Lymphatic: Negative.    Skin: Negative.    Musculoskeletal: Negative.    Gastrointestinal: Negative.    Genitourinary: Negative.    Neurological: Negative.    Psychiatric/Behavioral: Negative.     Allergic/Immunologic: Negative.        Vitals:    01/23/24 1045   BP: 134/78   Pulse: (!) 122   SpO2: 95%           Physical Exam  Vitals and nursing note reviewed.   Constitutional:       Appearance: Normal appearance.   HENT:      Head: Normocephalic.      Nose: Nose normal.      Mouth/Throat:      Mouth: Mucous membranes are moist.   Eyes:      General: No scleral icterus.     Conjunctiva/sclera: Conjunctivae normal.   Cardiovascular:      Rate and Rhythm: Normal rate. Rhythm irregular.      Heart sounds: No murmur heard.     No gallop.   Pulmonary:      Effort: Pulmonary effort is normal. No respiratory distress.      Breath sounds: Normal breath sounds. No wheezing or rales.   Abdominal:      General: Abdomen is flat. Bowel sounds are normal. There is no distension.      Palpations: Abdomen is soft.      Tenderness: There is no abdominal tenderness. There is no guarding.   Musculoskeletal:      Cervical back: Normal range of motion and neck supple.      Right lower leg: No edema.      Left lower leg: No edema.   Skin:     General: Skin is warm and dry.   Neurological:      General: No focal deficit present.      Mental Status: She is alert and oriented to person, place, and time.   Psychiatric:         Mood and Affect: Mood normal.         Behavior: Behavior normal.         Discussion/Summary:    Severe AS: TAVR workup started and on hold due to lower extremity wound that needs to heal first. Will touch base with valve center.      Persistent AF: Continue with metoprolol for rate control and continue AC with xarelto.      S/P PPM implant. Continue device checks     Chronic diastolic CHF: in setting of severe AS. Continue torsemide. Volume status is stabl      The patient was counseled regarding  diagnostic results, instructions for management, risk factor reductions, impressions. total time of encounter was 25 minutes and 15 minutes was spent counseling.

## 2024-01-25 ENCOUNTER — OFFICE VISIT (OUTPATIENT)
Dept: WOUND CARE | Facility: HOSPITAL | Age: 84
End: 2024-01-25
Payer: MEDICARE

## 2024-01-25 VITALS
HEART RATE: 75 BPM | RESPIRATION RATE: 17 BRPM | SYSTOLIC BLOOD PRESSURE: 132 MMHG | DIASTOLIC BLOOD PRESSURE: 77 MMHG | TEMPERATURE: 97.3 F

## 2024-01-25 DIAGNOSIS — I89.0 LYMPHEDEMA OF BOTH LOWER EXTREMITIES: ICD-10-CM

## 2024-01-25 DIAGNOSIS — S81.812A SKIN TEAR OF LEFT LOWER LEG WITHOUT COMPLICATION, INITIAL ENCOUNTER: Primary | ICD-10-CM

## 2024-01-25 DIAGNOSIS — I70.209 PERIPHERAL ARTERIOSCLEROSIS (HCC): ICD-10-CM

## 2024-01-25 DIAGNOSIS — R60.9 LIPEDEMA: ICD-10-CM

## 2024-01-25 PROCEDURE — 99212 OFFICE O/P EST SF 10 MIN: CPT | Performed by: STUDENT IN AN ORGANIZED HEALTH CARE EDUCATION/TRAINING PROGRAM

## 2024-01-25 PROCEDURE — 99213 OFFICE O/P EST LOW 20 MIN: CPT | Performed by: STUDENT IN AN ORGANIZED HEALTH CARE EDUCATION/TRAINING PROGRAM

## 2024-01-25 NOTE — PROGRESS NOTES
Patient ID: Natividad Ortiz is a 83 y.o. female Date of Birth 1940     Chief Complaint  Chief Complaint   Patient presents with    Follow Up Wound Care Visit     LLE       Allergies  Latex, Duloxetine hcl, Erythromycin, Levofloxacin, Penicillins, Savella [milnacipran], and Sulfa antibiotics    Assessment:     Diagnoses and all orders for this visit:    Skin tear of left lower leg without complication, initial encounter  -     Wound cleansing and dressings Skin Tear Left;Anterior Leg; Future  -     Wound compression and edema control Skin Tear Left;Anterior Leg; Future    Lymphedema of both lower extremities  -     Wound cleansing and dressings Skin Tear Left;Anterior Leg; Future  -     Wound compression and edema control Skin Tear Left;Anterior Leg; Future    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HCC)  -     Wound cleansing and dressings Skin Tear Left;Anterior Leg; Future  -     Wound compression and edema control Skin Tear Left;Anterior Leg; Future    Lipedema  -     Wound cleansing and dressings Skin Tear Left;Anterior Leg; Future  -     Wound compression and edema control Skin Tear Left;Anterior Leg; Future              Plan:   It was a pleasure to see Natividad Ortiz for wound care follow up today  Wound is fully epithelialized today.  Patient advised protect newly healed skin with compression stockings and moisturization  All plans of care discussed with patient at bedside who verbalized understanding with treatment plan.    Wound 01/10/24 Skin Tear Leg Left;Anterior (Active)   Wound Image Images linked 01/25/24 1052   Wound Description Epithelialization 01/25/24 1051   Dorys-wound Assessment Edema;Hyperpigmented 01/25/24 1051   Wound Length (cm) 0 cm 01/25/24 1051   Wound Width (cm) 0 cm 01/25/24 1051   Wound Depth (cm) 0 cm 01/25/24 1051   Wound Surface Area (cm^2) 0 cm^2 01/25/24 1051   Wound Volume (cm^3) 0 cm^3 01/25/24 1051   Calculated Wound Volume (cm^3) 0 cm^3 01/25/24 1051   Change in Wound Size %  100 01/25/24 1051   Drainage Amount None 01/25/24 1051   Non-staged Wound Description Not applicable 01/25/24 1051   Dressing Status Other (Comment) (no dressing upon arrival) 01/25/24 1051       Wound 01/10/24 Skin Tear Leg Left;Anterior (Active)   Date First Assessed/Time First Assessed: 01/10/24 1028   Primary Wound Type: Skin Tear  Location: Leg  Wound Location Orientation: Left;Anterior  Wound Outcome: Healed       [REMOVED] Wound 08/17/20 Knee Left (Removed)   Resolved Date: 09/19/23  Date First Assessed/Time First Assessed: 08/17/20 0936   Location: Knee  Wound Location Orientation: Left  Wound Description (Comments): darryl stocking  Incision's 1st Dressing: ADHESIVE SKIN CLOSR DERMABOND PRINEO, DRESSING MEP...       [REMOVED] Wound 03/28/22 Knee Right (Removed)   Resolved Date: 09/19/23  Date First Assessed/Time First Assessed: 03/28/22 0854   Location: Knee  Wound Location Orientation: Right  Wound Description (Comments): DARRYL stockings applied to bilateral lower legs  Incision's 1st Dressing: ADHESIVE SKIN HI...       [REMOVED] Wound 04/11/22 Surgical Knee Right (Removed)   Resolved Date: 09/19/23  Date First Assessed/Time First Assessed: 04/11/22 2000   Primary Wound Type: Surgical  Location: Knee  Wound Location Orientation: Right  Wound Outcome: Unknown (No longer present)       [REMOVED] Wound 09/05/23 Catheter entry/exit site Wrist Right (Removed)   Resolved Date: 09/19/23  Date First Assessed/Time First Assessed: 09/05/23 0921   Traumatic Wound Type: Catheter entry/exit site  Location: Wrist  Wound Location Orientation: Right  Wound Outcome: Unknown (No longer present)       [REMOVED] Wound 09/05/23 Skin Tear Pretibial Left;Outer (Removed)   Resolved Date: 09/19/23  Date First Assessed/Time First Assessed: 09/05/23 0730   Present on Original Admission: Yes  Primary Wound Type: Skin Tear  Location: Pretibial  Wound Location Orientation: Left;Outer  Wound Outcome: (c) Other (Comment)       [REMOVED] Wound  09/19/23 Traumatic Leg Left;Lateral (Removed)   Resolved Date: 01/10/24  Date First Assessed/Time First Assessed: 09/19/23 0938   Primary Wound Type: Traumatic  Location: Leg  Wound Location Orientation: Left;Lateral  Wound Outcome: Healed       [REMOVED] Wound 09/26/23 Traumatic Leg Right;Anterior (Removed)   Resolved Date: 01/10/24  Date First Assessed/Time First Assessed: 09/26/23 0950   Primary Wound Type: Traumatic  Location: Leg  Wound Location Orientation: Right;Anterior  Wound Outcome: Healed       Subjective:      .    83-year-old female following up for a left lower extremity skin tear.        The following portions of the patient's history were reviewed and updated as appropriate: allergies, current medications, past family history, past medical history, past social history, past surgical history, and problem list.    Review of Systems   Constitutional:  Negative for chills, diaphoresis and fever.   Skin:  Positive for wound.   All other systems reviewed and are negative.        Objective:       Wound 01/10/24 Skin Tear Leg Left;Anterior (Active)   Wound Image Images linked 01/25/24 1052   Wound Description Epithelialization 01/25/24 1051   Dorys-wound Assessment Edema;Hyperpigmented 01/25/24 1051   Wound Length (cm) 0 cm 01/25/24 1051   Wound Width (cm) 0 cm 01/25/24 1051   Wound Depth (cm) 0 cm 01/25/24 1051   Wound Surface Area (cm^2) 0 cm^2 01/25/24 1051   Wound Volume (cm^3) 0 cm^3 01/25/24 1051   Calculated Wound Volume (cm^3) 0 cm^3 01/25/24 1051   Change in Wound Size % 100 01/25/24 1051   Drainage Amount None 01/25/24 1051   Non-staged Wound Description Not applicable 01/25/24 1051   Dressing Status Other (Comment) (no dressing upon arrival) 01/25/24 1051       /77   Pulse 75   Temp (!) 97.3 °F (36.3 °C)   Resp 17     Physical Exam  Vitals reviewed.   Constitutional:       Appearance: Normal appearance.   HENT:      Head: Normocephalic and atraumatic.      Mouth/Throat:      Mouth: Mucous  membranes are moist.   Eyes:      Extraocular Movements: Extraocular movements intact.   Pulmonary:      Effort: Pulmonary effort is normal.   Skin:     Comments: Lateral left lower extremity skin tear fully epithelialized today.  No open wound.  No exudate.   Neurological:      Mental Status: She is alert.   Psychiatric:         Mood and Affect: Mood normal.         Behavior: Behavior normal.           Wound Instructions:  Orders Placed This Encounter   Procedures    Wound cleansing and dressings Skin Tear Left;Anterior Leg     Your left leg wound is healed.    Shower: Yes  Moisturize your leg daily.    Wear compression sock to control swelling (15-20 mm Hg).  Can be purchased at [employer], [employer], etc.    You are discharged from the Wound Center.  If you have any issues/concerns, please call the Wound Center     Standing Status:   Future     Standing Expiration Date:   1/25/2025    Wound compression and edema control Skin Tear Left;Anterior Leg     Compression Stocking, 15-20 mm Hg    Remove compression stockings every night at bed time and re-apply first thing every morning. Follow daily skin care as instructed.    Avoid prolonged standing in one place.    Elevate leg(s) above the level of the heart when sitting or as much as possible.     Standing Status:   Future     Standing Expiration Date:   1/25/2025        Diagnosis ICD-10-CM Associated Orders   1. Skin tear of left lower leg without complication, initial encounter  S81.812A Wound cleansing and dressings Skin Tear Left;Anterior Leg     Wound compression and edema control Skin Tear Left;Anterior Leg      2. Lymphedema of both lower extremities  I89.0 Wound cleansing and dressings Skin Tear Left;Anterior Leg     Wound compression and edema control Skin Tear Left;Anterior Leg      3. 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HCC)  I70.209 Wound cleansing and dressings Skin Tear Left;Anterior Leg     Wound compression and edema control Skin Tear Left;Anterior Leg      4.  "Lipedema  R60.9 Wound cleansing and dressings Skin Tear Left;Anterior Leg     Wound compression and edema control Skin Tear Left;Anterior Leg          --  Talia Damico MD    \"This note has been constructed using a voice recognition system. Therefore there may be syntax, spelling, and/or grammatical errors. Occasional wrong word or \"sound alike\" substitutions may have occurred due to the inherent limitations of voice recognition software. Read the chart carefully and recognize, using context, where substitutions have occurred. Please call if you have any questions.\"     "

## 2024-01-25 NOTE — PATIENT INSTRUCTIONS
Orders Placed This Encounter   Procedures    Wound cleansing and dressings Skin Tear Left;Anterior Leg     Your left leg wound is healed.    Shower: Yes  Moisturize your leg daily.    Wear compression sock to control swelling (15-20 mm Hg).  Can be purchased at [employer], [employer], etc.    You are discharged from the Wound Center.  If you have any issues/concerns, please call the Wound Center     Standing Status:   Future     Standing Expiration Date:   1/25/2025    Wound compression and edema control Skin Tear Left;Anterior Leg     Compression Stocking, 15-20 mm Hg    Remove compression stockings every night at bed time and re-apply first thing every morning. Follow daily skin care as instructed.    Avoid prolonged standing in one place.    Elevate leg(s) above the level of the heart when sitting or as much as possible.     Standing Status:   Future     Standing Expiration Date:   1/25/2025

## 2024-01-29 ENCOUNTER — REMOTE DEVICE CLINIC VISIT (OUTPATIENT)
Dept: CARDIOLOGY CLINIC | Facility: CLINIC | Age: 84
End: 2024-01-29

## 2024-01-29 DIAGNOSIS — Z95.0 PRESENCE OF CARDIAC PACEMAKER: Primary | ICD-10-CM

## 2024-01-29 PROCEDURE — RECHECK: Performed by: INTERNAL MEDICINE

## 2024-01-29 NOTE — PROGRESS NOTES
Results for orders placed or performed in visit on 01/29/24   Cardiac EP device report    Narrative    MDT-SINGLE CHAMBER PPM/ NOT MRI CONDITIONAL  CARELINK TRANSMISSION: BATTERY VOLTAGE NEARING ISABELA (6 MTHS). WILL SCHEDULE MONTHLY BATTERY CHECKS.  30.5% ALL AVAILABLE LEAD PARAMETERS WITHIN NORMAL LIMITS. 76 VHR EPISODES W/ AVAILABLE EGMS SHOWING PROBABLE RVR UP  BPM, LONGEST AT 1 MIN 42 SECS. TAKES XARELTO & METOPROLOL TART. EF 55% (ECHO 6/1/23). NORMAL DEVICE FUNCTION. AM

## 2024-01-31 ENCOUNTER — OFFICE VISIT (OUTPATIENT)
Dept: CARDIAC SURGERY | Facility: CLINIC | Age: 84
End: 2024-01-31
Payer: MEDICARE

## 2024-01-31 VITALS
HEIGHT: 64 IN | BODY MASS INDEX: 39.3 KG/M2 | TEMPERATURE: 96.8 F | DIASTOLIC BLOOD PRESSURE: 84 MMHG | OXYGEN SATURATION: 98 % | WEIGHT: 230.2 LBS | SYSTOLIC BLOOD PRESSURE: 134 MMHG | HEART RATE: 90 BPM

## 2024-01-31 DIAGNOSIS — Z01.810 PRE-OPERATIVE CARDIOVASCULAR EXAMINATION: ICD-10-CM

## 2024-01-31 DIAGNOSIS — Z01.812 ENCOUNTER FOR PRE-OPERATIVE LABORATORY TESTING: ICD-10-CM

## 2024-01-31 DIAGNOSIS — Z13.220 SCREENING FOR LIPOID DISORDERS: ICD-10-CM

## 2024-01-31 DIAGNOSIS — Z13.1 ENCOUNTER FOR SCREENING FOR DIABETES MELLITUS: ICD-10-CM

## 2024-01-31 DIAGNOSIS — Z86.39 H/O: OBESITY: ICD-10-CM

## 2024-01-31 DIAGNOSIS — I35.0 NONRHEUMATIC AORTIC VALVE STENOSIS: Primary | ICD-10-CM

## 2024-01-31 PROCEDURE — 99214 OFFICE O/P EST MOD 30 MIN: CPT | Performed by: THORACIC SURGERY (CARDIOTHORACIC VASCULAR SURGERY)

## 2024-01-31 RX ORDER — CHLORHEXIDINE GLUCONATE ORAL RINSE 1.2 MG/ML
15 SOLUTION DENTAL ONCE
OUTPATIENT
Start: 2024-01-31 | End: 2024-01-31

## 2024-01-31 RX ORDER — CIPROFLOXACIN 2 MG/ML
400 INJECTION, SOLUTION INTRAVENOUS ONCE
OUTPATIENT
Start: 2024-01-31 | End: 2024-01-31

## 2024-01-31 NOTE — PROGRESS NOTES
Consultation - Cardiothoracic Surgery   Natividad Ortiz 83 y.o. female MRN: [id number]    Physician Requesting Consult: Dr. Phan     Reason for Consult / Principal Problem: Aortic stenosis, Non-Rheumatic    History of Present Illness: Natividad Ortiz is a 83 y.o. year old female who was previously evaluated in our office by Liang Mccullough D.O. for transcatheter aortic valve replacement.  During this initial consultation, arrangements were made for the following studies to be completed: Gated CTA of the chest/abdomen/pelvis, cardiac catheterization, dental clearance and carotid artery ultrasound.    Natividad Ortiz now presents to review the results of these tests and discuss final surgical plans.     In review, patient has been following with Dr. Phan, and her most recent ECHO from 2023 demonstrated moderate to severe AS with mean and peak gradients of 25 mmHg and 41 mmHg respectively. She was therefore referred for TAVR evaluation. Her PMHx is significant for HTN, PAF (ablation in , multiple failed cardioversions, on Xarelto), obesity (BMI 38), multiple lung nodules, GERD, Carrero's esophagus, venous insufficiency, lumbar radiculopathy, peripheral neuropathy, left breast cancer (s/p lumpectomy and radiation treatment in ), restless leg syndrome, PPM for SSS.      Symptomatically, patient has been experiencing chest discomfort, dyspnea on exertion with walking, and much more so with inclines, fatigue, and orthopnea. She is on a diuretic, and takes it most days, except when she is traveling. She lives in a 2-story home with her , and her son lives next door. She is independent with her ADL's, walks with a cane, and drives. She is a former smoker, quit 25 years ago. Denies drinking alcohol or illicit substance use. Family history significant for her dad who underwent an aortic aneurysm repair in , and subsequently  from postop complications. She is COVID vaccinated. She has  regular appointments with her dentist, and we have obtained dental clearance.     Since her last visit, Natividad sustained an injury to her left lower leg, from hitting it on a car door. This resulted in a large hematoma, which was initially drained and then packed, but patient developed cellulitis and was admitted for IV antibiotics, and was discharged home. She recently completed her PO antibiotics about 2 weeks ago, and has been following closely with the wound care clinic. Her wound has now fully healed.     Natividad is accompanied today by her son, and they both report that her shortness of breath has worsened over the last few months.     Past Medical History:  Past Medical History:   Diagnosis Date    Arthritis     Atrial fibrillation (HCC)     Carrero's esophagus     last assessed: 1/23/2018    BRCA1 negative     BRCA2 negative     Breast cancer (HCC) 2006    stage 1 (left), given adjuvant radiation with Arimidex x 5 years    Cancer (HCC) 2006    Left Breast, Lumpectomy    Cataract     last assessed: 3/11/2014    Chronic diastolic CHF (congestive heart failure) ([facility]) 11/21/2022    Dysplasia of toenail     last assessed: 8/29/2017    Esophageal reflux     GERD (gastroesophageal reflux disease)     Gross hematuria     last assessed: 2/19/2015    Hematuria     Hiatal hernia     History of radiation therapy     Hypertension     Irregular heart beat     AFIB    Mixed sensory-motor polyneuropathy     Neuropathy     Obesity     Pacemaker     Paroxysmal atrial fibrillation (HCC)     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[facility]) 02/13/2018    Peripheral neuropathy     Rectal bleeding     Restless leg syndrome     Shortness of breath     last assessed: 1/11/2016         Past Surgical History:   Past Surgical History:   Procedure Laterality Date    BREAST BIOPSY Left 2006    BREAST LUMPECTOMY Left 2006    onset: 2006    BREAST SURGERY      CARDIAC CATHETERIZATION N/A 9/5/2023    Procedure: Cardiac RHC/LHC;  Surgeon: Patric Engalnd MD;   [facility];  Service: Cardiology    CARDIAC CATHETERIZATION N/A 9/5/2023    Procedure: Cardiac Coronary Angiogram;  Surgeon: Patric England MD;  [facility];  Service: Cardiology    CARDIAC CATHETERIZATION  9/5/2023    Procedure: Cardiac catheterization;  Surgeon: Patric England MD;  [facility];  Service: Cardiology    CARDIAC PACEMAKER PLACEMENT      x 3 2006    CATARACT EXTRACTION      COLONOSCOPY      CYSTOSCOPY  04/04/2014    diagnostic    HYSTERECTOMY      ALISON BSO; due to fibroid uterus; age 42    KNEE CARTILAGE SURGERY      excision lesion of meniscus or capsule knee    KNEE SURGERY      OOPHORECTOMY Bilateral     age 42    OTHER SURGICAL HISTORY      radiation therapy    IA ARTHRP KNE CONDYLE&PLATU MEDIAL&LAT COMPARTMENTS Left 08/17/2020    Procedure: ARTHROPLASTY KNEE TOTAL;  Surgeon: Melquiades Sterling DO;  Location: [facility];  Service: Orthopedics    IA ARTHRP KNE CONDYLE&PLATU MEDIAL&LAT COMPARTMENTS Right 03/28/2022    Procedure: ARTHROPLASTY KNEE TOTAL W ROBOT - RIGHT;  Surgeon: Melquiades Sterling DO;  Location: [facility];  Service: Orthopedics    IA COLONOSCOPY FLX DX W/COLLJ SPEC WHEN PFRMD N/A 02/08/2017    Procedure: COLONOSCOPY;  Surgeon: Desean Ham MD;  Location: [facility];  Service: Gastroenterology    IA ESOPHAGOGASTRODUODENOSCOPY TRANSORAL DIAGNOSTIC N/A 09/20/2017    Procedure: ESOPHAGOGASTRODUODENOSCOPY (EGD);  Surgeon: Jacob Morrell MD;  [facility]: [facility];  Service: Gastroenterology    UPPER GASTROINTESTINAL ENDOSCOPY           Family History:  Family History   Problem Relation Age of Onset    Hypertension Mother     Heart disease Father     Aneurysm Father     Coronary artery disease Father         in his 70s with aneurysm    Aortic aneurysm Father         abdominal    Scleroderma Sister     Breast cancer Sister 68    Hypertension Sister     Cancer Sister     No Known Problems Son     No Known Problems Son     Testicular cancer Son 41     Thyroid cancer Son 38    Colon cancer Maternal Aunt     Colon cancer Maternal Aunt     Breast cancer Other 50        kaylee's daughter    Alcohol abuse Neg Hx     Substance Abuse Neg Hx     Mental illness Neg Hx     Depression Neg Hx          Social History:    Social History     Substance and Sexual Activity   Alcohol Use Not Currently     Social History     Substance and Sexual Activity   Drug Use No     Social History     Tobacco Use   Smoking Status Former    Current packs/day: 0.00    Average packs/day: 1 pack/day for 25.0 years (25.0 ttl pk-yrs)    Types: Cigarettes    Start date: 1955    Quit date: 1980    Years since quittin.3   Smokeless Tobacco Never   Tobacco Comments    Quit over 30 years ago; quit age 47       Home Medications:   Prior to Admission medications    Medication Sig Start Date End Date Taking? Authorizing Provider   ascorbic acid (VITAMIN C) 500 MG tablet Take 500 mg by mouth 2 (two) times a day 23  Yes Historical Provider, MD   Calcium Acetate, Phos Binder, (CALCIUM ACETATE PO) Take by mouth daily     Yes Historical Provider, MD   clobetasol (TEMOVATE) 0.05 % ointment Apply once weekly to the affected area 23  Yes Sandrine King PA-C   famotidine (PEPCID) 40 MG tablet Take 1 tablet (40 mg total) by mouth every evening 23  Yes Sandy Gupta MD   fluticasone (FLONASE) 50 mcg/act nasal spray SPRAY 1 SPRAY INTO EACH NOSTRIL EVERY DAY 3/14/23  Yes Sandy Gupta MD   gabapentin (NEURONTIN) 800 mg tablet TAKE 1 TABLET BY MOUTH FOUR TIMES A DAY 10/30/23  Yes Roseline Pugh MD   loratadine (CLARITIN) 10 mg tablet Take 10 mg by mouth daily As needed   Yes Historical Provider, MD   magnesium 30 MG tablet Take 30 mg by mouth in the morning   Yes Historical Provider, MD   metoprolol tartrate (LOPRESSOR) 50 mg tablet Take 2 tablets in the a.m., 1 tablet in the p.m. 23  Yes RICKY Garcia   multivitamin (THERAGRAN) TABS Take 1 tablet by mouth  daily   Yes Maria Barakat MD   pantoprazole (PROTONIX) 20 mg tablet Take 1 tablet (20 mg total) by mouth every morning 1/9/24  Yes Luzma Ramos MD   pramipexole (MIRAPEX) 0.5 mg tablet TAKE 2 FULL TABLETS TWICE A DAY AT 5:00 P.M. AND 10:00 P.M. 7/31/23  Yes Roseline Pugh MD   rivaroxaban (Xarelto) 20 mg tablet Take 1 tablet (20 mg total) by mouth daily with breakfast 12/7/23  Yes RICKY Garcia   torsemide (DEMADEX) 20 mg tablet TAKE 1 TABLET BY MOUTH EVERY DAY 10/31/23  Yes Thom Phan MD       Allergies:  Allergies   Allergen Reactions    Latex      Added based on information entered during case entry, please review and add reactions, type, and severity as needed    Duloxetine Hcl Other (See Comments)     Facial pins and needles sensation    Erythromycin Rash    Levofloxacin Other (See Comments)     Muscular aches    Penicillins Rash    Savella [Milnacipran] Rash    Sulfa Antibiotics Rash       Review of Systems:  Review of Systems - History obtained from chart review and the patient  General ROS: positive for  - fatigue and change in activity tolerance   Psychological ROS: negative  Ophthalmic ROS: positive for - uses glasses  ENT ROS: negative  Allergy and Immunology ROS: negative  Hematological and Lymphatic ROS: negative  Endocrine ROS: negative  Respiratory ROS: positive for - orthopnea and shortness of breath  Cardiovascular ROS: positive for - dyspnea on exertion, edema, murmur, and orthopnea  Gastrointestinal ROS: no abdominal pain, change in bowel habits, or black or bloody stools  Genito-Urinary ROS: no dysuria, trouble voiding, or hematuria  Musculoskeletal ROS: negative  Neurological ROS: no TIA or stroke symptoms  Dermatological ROS: negative    Vital Signs:     Vitals:    01/31/24 0931   BP: 134/84   BP Location: Right arm   Patient Position: Sitting   Cuff Size: Large   Pulse: 90   Temp: (!) 96.8 °F (36 °C)   TempSrc: Tympanic   SpO2: 98%   Weight: 104 kg (230 lb 3.2 oz)   Height:  "5' 4\" (1.626 m)       Physical Exam:    General: alert, normal appearance, well groomed, NAD   HEENT/NECK:  PERRL.  No jugular venous distention.    Cardiac:Irregular rhythm, grade III/VI systolic Murmur.  Carotids: 2+, radiation of systolic murmur    Pulmonary:  Breath sounds clear bilaterally.  Abdomen:  Non-tender, Non-distended.  Positive bowel sounds.  Upper extremities: 2+ radial pulses; brisk capillary refill  Lower extremities: Extremities warm/dry. PT/DP pulses 2+ bilaterally.  2+ edema B/L  Neuro: Alert and oriented X 3.  Sensation is grossly intact.  No focal deficits.  Musculoskeletal: DONOHUE  Skin: Warm/Dry, without rashes or lesions.    Lab Results:               Invalid input(s): \"LABGLOM\"      Lab Results   Component Value Date    HGBA1C 5.8 (H) 08/22/2023     Lab Results   Component Value Date    TROPONINI <0.02 06/06/2019       Imaging Studies:     Echocardiogram: 6/1/23  Left Ventricle Left ventricular cavity size is normal. Wall thickness is normal. The left ventricular ejection fraction is 55%. Systolic function is normal.  Wall motion is normal. Unable to assess diastolic function due to atrial fibrillation.   Right Ventricle Right ventricular cavity size is mildly dilated. Systolic function is normal. Wall thickness is normal. A pacer wire is present.   Left Atrium The atrium is moderately dilated.   Right Atrium The atrium is moderately dilated.   Aortic Valve The aortic valve is trileaflet. The leaflets are not thickened. The leaflets are moderately calcified. There is moderately reduced mobility. There is no evidence of regurgitation. There is moderate to severe stenosis.   Mitral Valve Mitral valve structure is normal. There is mild regurgitation. There is no evidence of stenosis.   Tricuspid Valve Tricuspid valve structure is normal. There is mild to moderate regurgitation. There is no evidence of stenosis.   Pulmonic Valve Pulmonic valve structure is normal. There is mild regurgitation. " There is no evidence of stenosis.   Ascending Aorta The aortic root is normal in size.   IVC/SVC The inferior vena cava is not well visualized.   Pericardium There is no pericardial effusion. The pericardium is normal in appearance.       Gated CTA of the chest/abdomen/pelvis: 8/24/23  CT ANGIOGRAM OF THE CHEST, ABDOMEN AND PELVIS WITH AND WITHOUT IV CONTRAST     INDICATION:  Preoperative evaluation for TAVR     COMPARISON: None.     TECHNIQUE:  CT angiogram examination of the chest, abdomen and pelvis was performed according to standard protocol with cardiac gating. Axial, sagittal, and coronal reformatted images were submitted for interpretation. 3D reconstructions were performed   an independent workstation, and are supplied for review.     Radiation dose length product (DLP) for this visit:  2688.1 mGy-cm .  This examination, like all CT scans performed in the [facility], was performed utilizing techniques to minimize radiation dose exposure, including the use of iterative   reconstruction and automated exposure control.     IV Contrast:  120 mL of iodixanol (VISIPAQUE)  Enteric Contrast: Enteric contrast was not administered     FINDINGS:     VASCULAR STRUCTURES:  Annulus: diameter 27.5 x 18.9 mm  area: 415.7 sq mm  Annulus to LCA: 18.5 mm  Annulus to RCA: 10.2 mm  Please see series 751 for the relevant images.     Minimal diameter right iliofemoral segment: 7.7 x 8.6 mm. This occurs at the level of the distal external iliac.  Minimal diameter left iliofemoral segment: 7.4 x 8.6 mm. This occurs at the level of the mid external iliac.  Please see series 451 for the relevant images. There is moderate, symmetric tortuosity.     The ascending aorta is nonaneurysmal measuring 37 x 39 mm with mild atherosclerosis. There is a type II aortic arch with classic branching anatomy and no stenosis in the visualized great vessels. The aortic arch is nonaneurysmal with mild    atherosclerosis. The descending  thoracic aorta is nonaneurysmal with mild  atherosclerosis.     Cardiac findings:  There is relatively mild calcification of the aortic valve. The left ventricle is fairly short in the long axis view. This may be a function of comparison to the other chambers. There is apical thinning this appears to involve the inferior wall and   inferoseptum more so. The ventricular septum is not bulge. No prior valvular surgery. No prior bypass surgery. No pericardial effusion. No cardiac mass or thrombus. Dual lead pacer is in place.     The atria are quite enlarged. The right is enlarged much more so than the left.     The abdominal aorta .     OTHER FINDINGS:     CHEST     LUNGS: There is some scarring and volume loss in the left lung.     PLEURA:  Unremarkable.     MEDIASTINUM AND THOMAS: The pulmonary arteries are also large, consistent with some element of pulmonary hypertension     CHEST WALL AND LOWER NECK:   Unremarkable.     ABDOMEN     LIVER/BILIARY TREE: The contour of the liver is somewhat nodular. This is more impressive in the right lobe. Particularly medially. This would raise the suspicion of some element of cirrhosis. There is an element of corkscrew configuration to the hepatic   arteries. This would support the notion of cirrhosis.     GALLBLADDER:  No calcified gallstones. No pericholecystic inflammatory change.     SPLEEN: The spleen is enlarged. It measures 13 cm in long axis.     PANCREAS: The pancreas is atrophied. There is very little of gland tissue remaining     ADRENAL GLANDS:  Unremarkable.     KIDNEYS/URETERS:  Unremarkable. No hydronephrosis.     STOMACH AND BOWEL:  Unremarkable.     APPENDIX:  No findings to suggest appendicitis.     ABDOMINOPELVIC CAVITY:  No ascites or free intraperitoneal air. No lymphadenopathy.     PELVIS     REPRODUCTIVE ORGANS:  Unremarkable for patient's age.     URINARY BLADDER:  Unremarkable.     ABDOMINAL WALL/INGUINAL REGIONS:  Unremarkable.     OSSEOUS STRUCTURES:  No  acute fracture or destructive osseous lesion.     IMPRESSION:     TVAR measurements as above     Low-lying right coronary artery     Impressive atrial dilation      Cardiac catheterization: 9/5/23  No significant coronary atherosclerosis. The RCA ostium is relatively low in the right sinus.     Carotid artery ultrasound: 8/24/23  Impression  RIGHT:  There is <50% stenosis noted in the internal carotid artery. Plaque is  heterogenous and irregular.  Vertebral artery flow is antegrade. There is no significant subclavian artery  disease.     LEFT:  There is <50% stenosis noted in the internal carotid artery. Plaque is  heterogenous and irregular.  Vertebral artery flow is antegrade. There is no significant subclavian artery  disease.    I have personally reviewed pertinent reports.        Impression/Plan:    Natividad Ortiz has symptomatic severe aortic stenosis. They have undergone preop testing. The results of these studies have been reviewed with the patient.     The risks, benefits, and alternatives to open AVR were discussed in detail with the patient today. They understand and wish to proceed with aortic valve replacement.  Informed consent was obtained and patient has been scheduled for AVR tissue with Dr. Mccullough on 2/19/24.       Natividad Ortiz was comfortable with our recommendations, and their questions were answered to their satisfaction.       SIGNATURE: Fay Nobles PA-C  DATE: January 31, 2024  TIME: 10:42 AM

## 2024-01-31 NOTE — H&P
Preop History and Physical - Cardiothoracic Surgery   Natividad Ortiz 83 y.o. female MRN: [id number]    Physician Requesting Consult: Dr. Phan     Reason for Consult / Principal Problem: Aortic stenosis, Non-Rheumatic    History of Present Illness: Natividad Ortiz is a 83 y.o. year old female who was previously evaluated in our office by Liang Mccullough D.O. for transcatheter aortic valve replacement.  During this initial consultation, arrangements were made for the following studies to be completed: Gated CTA of the chest/abdomen/pelvis, cardiac catheterization, dental clearance and carotid artery ultrasound.    Natividad Ortiz now presents to review the results of these tests and discuss final surgical plans.     In review, patient has been following with Dr. Phan, and her most recent ECHO from 2023 demonstrated moderate to severe AS with mean and peak gradients of 25 mmHg and 41 mmHg respectively. She was therefore referred for TAVR evaluation. Her PMHx is significant for HTN, PAF (ablation in , multiple failed cardioversions, on Xarelto), obesity (BMI 38), multiple lung nodules, GERD, Crarero's esophagus, venous insufficiency, lumbar radiculopathy, peripheral neuropathy, left breast cancer (s/p lumpectomy and radiation treatment in ), restless leg syndrome, PPM for SSS.      Symptomatically, patient has been experiencing chest discomfort, dyspnea on exertion with walking, and much more so with inclines, fatigue, and orthopnea. She is on a diuretic, and takes it most days, except when she is traveling. She lives in a 2-story home with her , and her son lives next door. She is independent with her ADL's, walks with a cane, and drives. She is a former smoker, quit 25 years ago. Denies drinking alcohol or illicit substance use. Family history significant for her dad who underwent an aortic aneurysm repair in , and subsequently  from postop complications. She is COVID vaccinated.  She has regular appointments with her dentist, and we have obtained dental clearance.     Since her last visit, Natividad sustained an injury to her left lower leg, from hitting it on a car door. This resulted in a large hematoma, which was initially drained and then packed, but patient developed cellulitis and was admitted for IV antibiotics, and was discharged home. She recently completed her PO antibiotics about 2 weeks ago, and has been following closely with the wound care clinic. Her wound has now fully healed.     Natividad is accompanied today by her son, and they both report that her shortness of breath has worsened over the last few months.     Past Medical History:  Past Medical History:   Diagnosis Date    Arthritis     Atrial fibrillation (HCC)     Carrero's esophagus     last assessed: 1/23/2018    BRCA1 negative     BRCA2 negative     Breast cancer ([facility]) 2006    stage 1 (left), given adjuvant radiation with Arimidex x 5 years    Cancer (HCC) 2006    Left Breast, Lumpectomy    Cataract     last assessed: 3/11/2014    Chronic diastolic CHF (congestive heart failure) ([facility]) 11/21/2022    Dysplasia of toenail     last assessed: 8/29/2017    Esophageal reflux     GERD (gastroesophageal reflux disease)     Gross hematuria     last assessed: 2/19/2015    Hematuria     Hiatal hernia     History of radiation therapy     Hypertension     Irregular heart beat     AFIB    Mixed sensory-motor polyneuropathy     Neuropathy     Obesity     Pacemaker     Paroxysmal atrial fibrillation (HCC)     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[facility]) 02/13/2018    Peripheral neuropathy     Rectal bleeding     Restless leg syndrome     Shortness of breath     last assessed: 1/11/2016         Past Surgical History:   Past Surgical History:   Procedure Laterality Date    BREAST BIOPSY Left 2006    BREAST LUMPECTOMY Left 2006    onset: 2006    BREAST SURGERY      CARDIAC CATHETERIZATION N/A 9/5/2023    Procedure: Cardiac RHC/LHC;  Surgeon: Patric  MD Tomás;  [facility];  Service: Cardiology    CARDIAC CATHETERIZATION N/A 9/5/2023    Procedure: Cardiac Coronary Angiogram;  Surgeon: Patric England MD;  [facility];  Service: Cardiology    CARDIAC CATHETERIZATION  9/5/2023    Procedure: Cardiac catheterization;  Surgeon: Patric England MD;  [facility];  Service: Cardiology    CARDIAC PACEMAKER PLACEMENT      x 3 2006    CATARACT EXTRACTION      COLONOSCOPY      CYSTOSCOPY  04/04/2014    diagnostic    HYSTERECTOMY      ALISON BSO; due to fibroid uterus; age 42    KNEE CARTILAGE SURGERY      excision lesion of meniscus or capsule knee    KNEE SURGERY      OOPHORECTOMY Bilateral     age 42    OTHER SURGICAL HISTORY      radiation therapy    SD ARTHRP KNE CONDYLE&PLATU MEDIAL&LAT COMPARTMENTS Left 08/17/2020    Procedure: ARTHROPLASTY KNEE TOTAL;  Surgeon: Melquiades Sterling DO;  Location: [facility];  Service: Orthopedics    SD ARTHRP KNE CONDYLE&PLATU MEDIAL&LAT COMPARTMENTS Right 03/28/2022    Procedure: ARTHROPLASTY KNEE TOTAL W ROBOT - RIGHT;  Surgeon: Melquiades Sterling DO;  Location: [facility];  Service: Orthopedics    SD COLONOSCOPY FLX DX W/COLLJ SPEC WHEN PFRMD N/A 02/08/2017    Procedure: COLONOSCOPY;  Surgeon: Desean Ham MD;  Location: [facility];  Service: Gastroenterology    SD ESOPHAGOGASTRODUODENOSCOPY TRANSORAL DIAGNOSTIC N/A 09/20/2017    Procedure: ESOPHAGOGASTRODUODENOSCOPY (EGD);  Surgeon: Jacob Morrell MD;  [facility]: [facility];  Service: Gastroenterology    UPPER GASTROINTESTINAL ENDOSCOPY           Family History:  Family History   Problem Relation Age of Onset    Hypertension Mother     Heart disease Father     Aneurysm Father     Coronary artery disease Father         in his 70s with aneurysm    Aortic aneurysm Father         abdominal    Scleroderma Sister     Breast cancer Sister 68    Hypertension Sister     Cancer Sister     No Known Problems Son     No Known Problems Son     Testicular cancer  Son 41    Thyroid cancer Son 38    Colon cancer Maternal Aunt     Colon cancer Maternal Aunt     Breast cancer Other 50        kaylee's daughter    Alcohol abuse Neg Hx     Substance Abuse Neg Hx     Mental illness Neg Hx     Depression Neg Hx          Social History:    Social History     Substance and Sexual Activity   Alcohol Use Not Currently     Social History     Substance and Sexual Activity   Drug Use No     Social History     Tobacco Use   Smoking Status Former    Current packs/day: 0.00    Average packs/day: 1 pack/day for 25.0 years (25.0 ttl pk-yrs)    Types: Cigarettes    Start date: 1955    Quit date: 1980    Years since quittin.3   Smokeless Tobacco Never   Tobacco Comments    Quit over 30 years ago; quit age 47       Home Medications:   Prior to Admission medications    Medication Sig Start Date End Date Taking? Authorizing Provider   ascorbic acid (VITAMIN C) 500 MG tablet Take 500 mg by mouth 2 (two) times a day 23  Yes Historical Provider, MD   Calcium Acetate, Phos Binder, (CALCIUM ACETATE PO) Take by mouth daily     Yes Historical Provider, MD   clobetasol (TEMOVATE) 0.05 % ointment Apply once weekly to the affected area 23  Yes Sandrine King PA-C   famotidine (PEPCID) 40 MG tablet Take 1 tablet (40 mg total) by mouth every evening 23  Yes Sandy Gupta MD   fluticasone (FLONASE) 50 mcg/act nasal spray SPRAY 1 SPRAY INTO EACH NOSTRIL EVERY DAY 3/14/23  Yes Sandy Gupta MD   gabapentin (NEURONTIN) 800 mg tablet TAKE 1 TABLET BY MOUTH FOUR TIMES A DAY 10/30/23  Yes Roseline Pugh MD   loratadine (CLARITIN) 10 mg tablet Take 10 mg by mouth daily As needed   Yes Historical Provider, MD   magnesium 30 MG tablet Take 30 mg by mouth in the morning   Yes Historical Provider, MD   metoprolol tartrate (LOPRESSOR) 50 mg tablet Take 2 tablets in the a.m., 1 tablet in the p.m. 23  Yes RICKY Garcia   multivitamin (THERAGRAN) TABS Take 1 tablet  by mouth daily   Yes Historical Provider, MD   pantoprazole (PROTONIX) 20 mg tablet Take 1 tablet (20 mg total) by mouth every morning 1/9/24  Yes Luzma Ramos MD   pramipexole (MIRAPEX) 0.5 mg tablet TAKE 2 FULL TABLETS TWICE A DAY AT 5:00 P.M. AND 10:00 P.M. 7/31/23  Yes Roseline Pugh MD   rivaroxaban (Xarelto) 20 mg tablet Take 1 tablet (20 mg total) by mouth daily with breakfast 12/7/23  Yes RICKY Garcia   torsemide (DEMADEX) 20 mg tablet TAKE 1 TABLET BY MOUTH EVERY DAY 10/31/23  Yes Thom Phan MD       Allergies:  Allergies   Allergen Reactions    Latex      Added based on information entered during case entry, please review and add reactions, type, and severity as needed    Duloxetine Hcl Other (See Comments)     Facial pins and needles sensation    Erythromycin Rash    Levofloxacin Other (See Comments)     Muscular aches    Penicillins Rash    Savella [Milnacipran] Rash    Sulfa Antibiotics Rash       Review of Systems:  Review of Systems - History obtained from chart review and the patient  General ROS: positive for  - fatigue and change in activity tolerance   Psychological ROS: negative  Ophthalmic ROS: positive for - uses glasses  ENT ROS: negative  Allergy and Immunology ROS: negative  Hematological and Lymphatic ROS: negative  Endocrine ROS: negative  Respiratory ROS: positive for - orthopnea and shortness of breath  Cardiovascular ROS: positive for - dyspnea on exertion, edema, murmur, and orthopnea  Gastrointestinal ROS: no abdominal pain, change in bowel habits, or black or bloody stools  Genito-Urinary ROS: no dysuria, trouble voiding, or hematuria  Musculoskeletal ROS: negative  Neurological ROS: no TIA or stroke symptoms  Dermatological ROS: negative    Vital Signs:     Vitals:    01/31/24 0931   BP: 134/84   BP Location: Right arm   Patient Position: Sitting   Cuff Size: Large   Pulse: 90   Temp: (!) 96.8 °F (36 °C)   TempSrc: Tympanic   SpO2: 98%   Weight: 104 kg (230 lb 3.2 oz)  "  Height: 5' 4\" (1.626 m)       Physical Exam:    General: alert, normal appearance, well groomed, NAD   HEENT/NECK:  PERRL.  No jugular venous distention.    Cardiac:Irregular rhythm, grade III/VI systolic Murmur.  Carotids: 2+, radiation of systolic murmur    Pulmonary:  Breath sounds clear bilaterally.  Abdomen:  Non-tender, Non-distended.  Positive bowel sounds.  Upper extremities: 2+ radial pulses; brisk capillary refill  Lower extremities: Extremities warm/dry. PT/DP pulses 2+ bilaterally.  2+ edema B/L  Neuro: Alert and oriented X 3.  Sensation is grossly intact.  No focal deficits.  Musculoskeletal: DONOHUE  Skin: Warm/Dry, without rashes or lesions.    Lab Results:               Invalid input(s): \"LABGLOM\"      Lab Results   Component Value Date    HGBA1C 5.8 (H) 08/22/2023     Lab Results   Component Value Date    TROPONINI <0.02 06/06/2019       Imaging Studies:     Echocardiogram: 6/1/23  Left Ventricle Left ventricular cavity size is normal. Wall thickness is normal. The left ventricular ejection fraction is 55%. Systolic function is normal.  Wall motion is normal. Unable to assess diastolic function due to atrial fibrillation.   Right Ventricle Right ventricular cavity size is mildly dilated. Systolic function is normal. Wall thickness is normal. A pacer wire is present.   Left Atrium The atrium is moderately dilated.   Right Atrium The atrium is moderately dilated.   Aortic Valve The aortic valve is trileaflet. The leaflets are not thickened. The leaflets are moderately calcified. There is moderately reduced mobility. There is no evidence of regurgitation. There is moderate to severe stenosis.   Mitral Valve Mitral valve structure is normal. There is mild regurgitation. There is no evidence of stenosis.   Tricuspid Valve Tricuspid valve structure is normal. There is mild to moderate regurgitation. There is no evidence of stenosis.   Pulmonic Valve Pulmonic valve structure is normal. There is mild " regurgitation. There is no evidence of stenosis.   Ascending Aorta The aortic root is normal in size.   IVC/SVC The inferior vena cava is not well visualized.   Pericardium There is no pericardial effusion. The pericardium is normal in appearance.       Gated CTA of the chest/abdomen/pelvis: 8/24/23  CT ANGIOGRAM OF THE CHEST, ABDOMEN AND PELVIS WITH AND WITHOUT IV CONTRAST     INDICATION:  Preoperative evaluation for TAVR     COMPARISON: None.     TECHNIQUE:  CT angiogram examination of the chest, abdomen and pelvis was performed according to standard protocol with cardiac gating. Axial, sagittal, and coronal reformatted images were submitted for interpretation. 3D reconstructions were performed   an independent workstation, and are supplied for review.     Radiation dose length product (DLP) for this visit:  2688.1 mGy-cm .  This examination, like all CT scans performed in the [facility], was performed utilizing techniques to minimize radiation dose exposure, including the use of iterative   reconstruction and automated exposure control.     IV Contrast:  120 mL of iodixanol (VISIPAQUE)  Enteric Contrast: Enteric contrast was not administered     FINDINGS:     VASCULAR STRUCTURES:  Annulus: diameter 27.5 x 18.9 mm  area: 415.7 sq mm  Annulus to LCA: 18.5 mm  Annulus to RCA: 10.2 mm  Please see series 751 for the relevant images.     Minimal diameter right iliofemoral segment: 7.7 x 8.6 mm. This occurs at the level of the distal external iliac.  Minimal diameter left iliofemoral segment: 7.4 x 8.6 mm. This occurs at the level of the mid external iliac.  Please see series 451 for the relevant images. There is moderate, symmetric tortuosity.     The ascending aorta is nonaneurysmal measuring 37 x 39 mm with mild atherosclerosis. There is a type II aortic arch with classic branching anatomy and no stenosis in the visualized great vessels. The aortic arch is nonaneurysmal with mild    atherosclerosis.  The descending thoracic aorta is nonaneurysmal with mild  atherosclerosis.     Cardiac findings:  There is relatively mild calcification of the aortic valve. The left ventricle is fairly short in the long axis view. This may be a function of comparison to the other chambers. There is apical thinning this appears to involve the inferior wall and   inferoseptum more so. The ventricular septum is not bulge. No prior valvular surgery. No prior bypass surgery. No pericardial effusion. No cardiac mass or thrombus. Dual lead pacer is in place.     The atria are quite enlarged. The right is enlarged much more so than the left.     The abdominal aorta .     OTHER FINDINGS:     CHEST     LUNGS: There is some scarring and volume loss in the left lung.     PLEURA:  Unremarkable.     MEDIASTINUM AND THOMAS: The pulmonary arteries are also large, consistent with some element of pulmonary hypertension     CHEST WALL AND LOWER NECK:   Unremarkable.     ABDOMEN     LIVER/BILIARY TREE: The contour of the liver is somewhat nodular. This is more impressive in the right lobe. Particularly medially. This would raise the suspicion of some element of cirrhosis. There is an element of corkscrew configuration to the hepatic   arteries. This would support the notion of cirrhosis.     GALLBLADDER:  No calcified gallstones. No pericholecystic inflammatory change.     SPLEEN: The spleen is enlarged. It measures 13 cm in long axis.     PANCREAS: The pancreas is atrophied. There is very little of gland tissue remaining     ADRENAL GLANDS:  Unremarkable.     KIDNEYS/URETERS:  Unremarkable. No hydronephrosis.     STOMACH AND BOWEL:  Unremarkable.     APPENDIX:  No findings to suggest appendicitis.     ABDOMINOPELVIC CAVITY:  No ascites or free intraperitoneal air. No lymphadenopathy.     PELVIS     REPRODUCTIVE ORGANS:  Unremarkable for patient's age.     URINARY BLADDER:  Unremarkable.     ABDOMINAL WALL/INGUINAL REGIONS:  Unremarkable.     OSSEOUS  STRUCTURES:  No acute fracture or destructive osseous lesion.     IMPRESSION:     TVAR measurements as above     Low-lying right coronary artery     Impressive atrial dilation      Cardiac catheterization: 9/5/23  No significant coronary atherosclerosis. The RCA ostium is relatively low in the right sinus.     Carotid artery ultrasound: 8/24/23  Impression  RIGHT:  There is <50% stenosis noted in the internal carotid artery. Plaque is  heterogenous and irregular.  Vertebral artery flow is antegrade. There is no significant subclavian artery  disease.     LEFT:  There is <50% stenosis noted in the internal carotid artery. Plaque is  heterogenous and irregular.  Vertebral artery flow is antegrade. There is no significant subclavian artery  disease.    I have personally reviewed pertinent reports.        Impression/Plan:    Natividad Ortiz has symptomatic severe aortic stenosis. They have undergone preop testing. The results of these studies have been reviewed with the patient.     The risks, benefits, and alternatives to open AVR were discussed in detail with the patient today. They understand and wish to proceed with aortic valve replacement.  Informed consent was obtained and patient has been scheduled for AVR tissue with Dr. Mccullough on 2/19/24.       Natividad Ortiz was comfortable with our recommendations, and their questions were answered to their satisfaction.       SIGNATURE: Fay Nobles PA-C  DATE: January 31, 2024  TIME: 10:42 AM

## 2024-01-31 NOTE — H&P (VIEW-ONLY)
Consultation - Cardiothoracic Surgery   Natividad Ortiz 83 y.o. female MRN: [id number]    Physician Requesting Consult: Dr. Phan     Reason for Consult / Principal Problem: Aortic stenosis, Non-Rheumatic    History of Present Illness: Natividad Ortiz is a 83 y.o. year old female who was previously evaluated in our office by Liang Mccullough D.O. for transcatheter aortic valve replacement.  During this initial consultation, arrangements were made for the following studies to be completed: Gated CTA of the chest/abdomen/pelvis, cardiac catheterization, dental clearance and carotid artery ultrasound.    Natividad Ortiz now presents to review the results of these tests and discuss final surgical plans.     In review, patient has been following with Dr. Phan, and her most recent ECHO from 2023 demonstrated moderate to severe AS with mean and peak gradients of 25 mmHg and 41 mmHg respectively. She was therefore referred for TAVR evaluation. Her PMHx is significant for HTN, PAF (ablation in , multiple failed cardioversions, on Xarelto), obesity (BMI 38), multiple lung nodules, GERD, Carrero's esophagus, venous insufficiency, lumbar radiculopathy, peripheral neuropathy, left breast cancer (s/p lumpectomy and radiation treatment in ), restless leg syndrome, PPM for SSS.      Symptomatically, patient has been experiencing chest discomfort, dyspnea on exertion with walking, and much more so with inclines, fatigue, and orthopnea. She is on a diuretic, and takes it most days, except when she is traveling. She lives in a 2-story home with her , and her son lives next door. She is independent with her ADL's, walks with a cane, and drives. She is a former smoker, quit 25 years ago. Denies drinking alcohol or illicit substance use. Family history significant for her dad who underwent an aortic aneurysm repair in , and subsequently  from postop complications. She is COVID vaccinated. She has  regular appointments with her dentist, and we have obtained dental clearance.     Since her last visit, Natividad sustained an injury to her left lower leg, from hitting it on a car door. This resulted in a large hematoma, which was initially drained and then packed, but patient developed cellulitis and was admitted for IV antibiotics, and was discharged home. She recently completed her PO antibiotics about 2 weeks ago, and has been following closely with the wound care clinic. Her wound has now fully healed.     Natividad is accompanied today by her son, and they both report that her shortness of breath has worsened over the last few months.     Past Medical History:  Past Medical History:   Diagnosis Date    Arthritis     Atrial fibrillation (HCC)     Carrero's esophagus     last assessed: 1/23/2018    BRCA1 negative     BRCA2 negative     Breast cancer (HCC) 2006    stage 1 (left), given adjuvant radiation with Arimidex x 5 years    Cancer (HCC) 2006    Left Breast, Lumpectomy    Cataract     last assessed: 3/11/2014    Chronic diastolic CHF (congestive heart failure) ([facility]) 11/21/2022    Dysplasia of toenail     last assessed: 8/29/2017    Esophageal reflux     GERD (gastroesophageal reflux disease)     Gross hematuria     last assessed: 2/19/2015    Hematuria     Hiatal hernia     History of radiation therapy     Hypertension     Irregular heart beat     AFIB    Mixed sensory-motor polyneuropathy     Neuropathy     Obesity     Pacemaker     Paroxysmal atrial fibrillation (HCC)     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[facility]) 02/13/2018    Peripheral neuropathy     Rectal bleeding     Restless leg syndrome     Shortness of breath     last assessed: 1/11/2016         Past Surgical History:   Past Surgical History:   Procedure Laterality Date    BREAST BIOPSY Left 2006    BREAST LUMPECTOMY Left 2006    onset: 2006    BREAST SURGERY      CARDIAC CATHETERIZATION N/A 9/5/2023    Procedure: Cardiac RHC/LHC;  Surgeon: Patric England MD;   [facility];  Service: Cardiology    CARDIAC CATHETERIZATION N/A 9/5/2023    Procedure: Cardiac Coronary Angiogram;  Surgeon: Patric England MD;  [facility];  Service: Cardiology    CARDIAC CATHETERIZATION  9/5/2023    Procedure: Cardiac catheterization;  Surgeon: Patric England MD;  [facility];  Service: Cardiology    CARDIAC PACEMAKER PLACEMENT      x 3 2006    CATARACT EXTRACTION      COLONOSCOPY      CYSTOSCOPY  04/04/2014    diagnostic    HYSTERECTOMY      ALISON BSO; due to fibroid uterus; age 42    KNEE CARTILAGE SURGERY      excision lesion of meniscus or capsule knee    KNEE SURGERY      OOPHORECTOMY Bilateral     age 42    OTHER SURGICAL HISTORY      radiation therapy    CA ARTHRP KNE CONDYLE&PLATU MEDIAL&LAT COMPARTMENTS Left 08/17/2020    Procedure: ARTHROPLASTY KNEE TOTAL;  Surgeon: Melquiades Sterling DO;  Location: [facility];  Service: Orthopedics    CA ARTHRP KNE CONDYLE&PLATU MEDIAL&LAT COMPARTMENTS Right 03/28/2022    Procedure: ARTHROPLASTY KNEE TOTAL W ROBOT - RIGHT;  Surgeon: Melquiades Sterling DO;  Location: [facility];  Service: Orthopedics    CA COLONOSCOPY FLX DX W/COLLJ SPEC WHEN PFRMD N/A 02/08/2017    Procedure: COLONOSCOPY;  Surgeon: Desean Ham MD;  Location: [facility];  Service: Gastroenterology    CA ESOPHAGOGASTRODUODENOSCOPY TRANSORAL DIAGNOSTIC N/A 09/20/2017    Procedure: ESOPHAGOGASTRODUODENOSCOPY (EGD);  Surgeon: Jacob Morrell MD;  Location: [facility];  Service: Gastroenterology    UPPER GASTROINTESTINAL ENDOSCOPY           Family History:  Family History   Problem Relation Age of Onset    Hypertension Mother     Heart disease Father     Aneurysm Father     Coronary artery disease Father         in his 70s with aneurysm    Aortic aneurysm Father         abdominal    Scleroderma Sister     Breast cancer Sister 68    Hypertension Sister     Cancer Sister     No Known Problems Son     No Known Problems Son     Testicular cancer Son 41     Thyroid cancer Son 38    Colon cancer Maternal Aunt     Colon cancer Maternal Aunt     Breast cancer Other 50        kaylee's daughter    Alcohol abuse Neg Hx     Substance Abuse Neg Hx     Mental illness Neg Hx     Depression Neg Hx          Social History:    Social History     Substance and Sexual Activity   Alcohol Use Not Currently     Social History     Substance and Sexual Activity   Drug Use No     Social History     Tobacco Use   Smoking Status Former    Current packs/day: 0.00    Average packs/day: 1 pack/day for 25.0 years (25.0 ttl pk-yrs)    Types: Cigarettes    Start date: 1955    Quit date: 1980    Years since quittin.3   Smokeless Tobacco Never   Tobacco Comments    Quit over 30 years ago; quit age 47       Home Medications:   Prior to Admission medications    Medication Sig Start Date End Date Taking? Authorizing Provider   ascorbic acid (VITAMIN C) 500 MG tablet Take 500 mg by mouth 2 (two) times a day 23  Yes Historical Provider, MD   Calcium Acetate, Phos Binder, (CALCIUM ACETATE PO) Take by mouth daily     Yes Historical Provider, MD   clobetasol (TEMOVATE) 0.05 % ointment Apply once weekly to the affected area 23  Yes Sandrine King PA-C   famotidine (PEPCID) 40 MG tablet Take 1 tablet (40 mg total) by mouth every evening 23  Yes Sandy Gupta MD   fluticasone (FLONASE) 50 mcg/act nasal spray SPRAY 1 SPRAY INTO EACH NOSTRIL EVERY DAY 3/14/23  Yes Sandy Gupta MD   gabapentin (NEURONTIN) 800 mg tablet TAKE 1 TABLET BY MOUTH FOUR TIMES A DAY 10/30/23  Yes Roseline Pugh MD   loratadine (CLARITIN) 10 mg tablet Take 10 mg by mouth daily As needed   Yes Historical Provider, MD   magnesium 30 MG tablet Take 30 mg by mouth in the morning   Yes Historical Provider, MD   metoprolol tartrate (LOPRESSOR) 50 mg tablet Take 2 tablets in the a.m., 1 tablet in the p.m. 23  Yes RICKY Garcia   multivitamin (THERAGRAN) TABS Take 1 tablet by mouth  daily   Yes Maria Barakat MD   pantoprazole (PROTONIX) 20 mg tablet Take 1 tablet (20 mg total) by mouth every morning 1/9/24  Yes Luzma Ramos MD   pramipexole (MIRAPEX) 0.5 mg tablet TAKE 2 FULL TABLETS TWICE A DAY AT 5:00 P.M. AND 10:00 P.M. 7/31/23  Yes Roseline Pugh MD   rivaroxaban (Xarelto) 20 mg tablet Take 1 tablet (20 mg total) by mouth daily with breakfast 12/7/23  Yes RICKY Garcia   torsemide (DEMADEX) 20 mg tablet TAKE 1 TABLET BY MOUTH EVERY DAY 10/31/23  Yes Thom Phan MD       Allergies:  Allergies   Allergen Reactions    Latex      Added based on information entered during case entry, please review and add reactions, type, and severity as needed    Duloxetine Hcl Other (See Comments)     Facial pins and needles sensation    Erythromycin Rash    Levofloxacin Other (See Comments)     Muscular aches    Penicillins Rash    Savella [Milnacipran] Rash    Sulfa Antibiotics Rash       Review of Systems:  Review of Systems - History obtained from chart review and the patient  General ROS: positive for  - fatigue and change in activity tolerance   Psychological ROS: negative  Ophthalmic ROS: positive for - uses glasses  ENT ROS: negative  Allergy and Immunology ROS: negative  Hematological and Lymphatic ROS: negative  Endocrine ROS: negative  Respiratory ROS: positive for - orthopnea and shortness of breath  Cardiovascular ROS: positive for - dyspnea on exertion, edema, murmur, and orthopnea  Gastrointestinal ROS: no abdominal pain, change in bowel habits, or black or bloody stools  Genito-Urinary ROS: no dysuria, trouble voiding, or hematuria  Musculoskeletal ROS: negative  Neurological ROS: no TIA or stroke symptoms  Dermatological ROS: negative    Vital Signs:     Vitals:    01/31/24 0931   BP: 134/84   BP Location: Right arm   Patient Position: Sitting   Cuff Size: Large   Pulse: 90   Temp: (!) 96.8 °F (36 °C)   TempSrc: Tympanic   SpO2: 98%   Weight: 104 kg (230 lb 3.2 oz)   Height:  "5' 4\" (1.626 m)       Physical Exam:    General: alert, normal appearance, well groomed, NAD   HEENT/NECK:  PERRL.  No jugular venous distention.    Cardiac:Irregular rhythm, grade III/VI systolic Murmur.  Carotids: 2+, radiation of systolic murmur    Pulmonary:  Breath sounds clear bilaterally.  Abdomen:  Non-tender, Non-distended.  Positive bowel sounds.  Upper extremities: 2+ radial pulses; brisk capillary refill  Lower extremities: Extremities warm/dry. PT/DP pulses 2+ bilaterally.  2+ edema B/L  Neuro: Alert and oriented X 3.  Sensation is grossly intact.  No focal deficits.  Musculoskeletal: DONOHUE  Skin: Warm/Dry, without rashes or lesions.    Lab Results:               Invalid input(s): \"LABGLOM\"      Lab Results   Component Value Date    HGBA1C 5.8 (H) 08/22/2023     Lab Results   Component Value Date    TROPONINI <0.02 06/06/2019       Imaging Studies:     Echocardiogram: 6/1/23  Left Ventricle Left ventricular cavity size is normal. Wall thickness is normal. The left ventricular ejection fraction is 55%. Systolic function is normal.  Wall motion is normal. Unable to assess diastolic function due to atrial fibrillation.   Right Ventricle Right ventricular cavity size is mildly dilated. Systolic function is normal. Wall thickness is normal. A pacer wire is present.   Left Atrium The atrium is moderately dilated.   Right Atrium The atrium is moderately dilated.   Aortic Valve The aortic valve is trileaflet. The leaflets are not thickened. The leaflets are moderately calcified. There is moderately reduced mobility. There is no evidence of regurgitation. There is moderate to severe stenosis.   Mitral Valve Mitral valve structure is normal. There is mild regurgitation. There is no evidence of stenosis.   Tricuspid Valve Tricuspid valve structure is normal. There is mild to moderate regurgitation. There is no evidence of stenosis.   Pulmonic Valve Pulmonic valve structure is normal. There is mild regurgitation. " There is no evidence of stenosis.   Ascending Aorta The aortic root is normal in size.   IVC/SVC The inferior vena cava is not well visualized.   Pericardium There is no pericardial effusion. The pericardium is normal in appearance.       Gated CTA of the chest/abdomen/pelvis: 8/24/23  CT ANGIOGRAM OF THE CHEST, ABDOMEN AND PELVIS WITH AND WITHOUT IV CONTRAST     INDICATION:  Preoperative evaluation for TAVR     COMPARISON: None.     TECHNIQUE:  CT angiogram examination of the chest, abdomen and pelvis was performed according to standard protocol with cardiac gating. Axial, sagittal, and coronal reformatted images were submitted for interpretation. 3D reconstructions were performed   an independent workstation, and are supplied for review.     Radiation dose length product (DLP) for this visit:  2688.1 mGy-cm .  This examination, like all CT scans performed in the [facility], was performed utilizing techniques to minimize radiation dose exposure, including the use of iterative   reconstruction and automated exposure control.     IV Contrast:  120 mL of iodixanol (VISIPAQUE)  Enteric Contrast: Enteric contrast was not administered     FINDINGS:     VASCULAR STRUCTURES:  Annulus: diameter 27.5 x 18.9 mm  area: 415.7 sq mm  Annulus to LCA: 18.5 mm  Annulus to RCA: 10.2 mm  Please see series 751 for the relevant images.     Minimal diameter right iliofemoral segment: 7.7 x 8.6 mm. This occurs at the level of the distal external iliac.  Minimal diameter left iliofemoral segment: 7.4 x 8.6 mm. This occurs at the level of the mid external iliac.  Please see series 451 for the relevant images. There is moderate, symmetric tortuosity.     The ascending aorta is nonaneurysmal measuring 37 x 39 mm with mild atherosclerosis. There is a type II aortic arch with classic branching anatomy and no stenosis in the visualized great vessels. The aortic arch is nonaneurysmal with mild    atherosclerosis. The descending  thoracic aorta is nonaneurysmal with mild  atherosclerosis.     Cardiac findings:  There is relatively mild calcification of the aortic valve. The left ventricle is fairly short in the long axis view. This may be a function of comparison to the other chambers. There is apical thinning this appears to involve the inferior wall and   inferoseptum more so. The ventricular septum is not bulge. No prior valvular surgery. No prior bypass surgery. No pericardial effusion. No cardiac mass or thrombus. Dual lead pacer is in place.     The atria are quite enlarged. The right is enlarged much more so than the left.     The abdominal aorta .     OTHER FINDINGS:     CHEST     LUNGS: There is some scarring and volume loss in the left lung.     PLEURA:  Unremarkable.     MEDIASTINUM AND THOMAS: The pulmonary arteries are also large, consistent with some element of pulmonary hypertension     CHEST WALL AND LOWER NECK:   Unremarkable.     ABDOMEN     LIVER/BILIARY TREE: The contour of the liver is somewhat nodular. This is more impressive in the right lobe. Particularly medially. This would raise the suspicion of some element of cirrhosis. There is an element of corkscrew configuration to the hepatic   arteries. This would support the notion of cirrhosis.     GALLBLADDER:  No calcified gallstones. No pericholecystic inflammatory change.     SPLEEN: The spleen is enlarged. It measures 13 cm in long axis.     PANCREAS: The pancreas is atrophied. There is very little of gland tissue remaining     ADRENAL GLANDS:  Unremarkable.     KIDNEYS/URETERS:  Unremarkable. No hydronephrosis.     STOMACH AND BOWEL:  Unremarkable.     APPENDIX:  No findings to suggest appendicitis.     ABDOMINOPELVIC CAVITY:  No ascites or free intraperitoneal air. No lymphadenopathy.     PELVIS     REPRODUCTIVE ORGANS:  Unremarkable for patient's age.     URINARY BLADDER:  Unremarkable.     ABDOMINAL WALL/INGUINAL REGIONS:  Unremarkable.     OSSEOUS STRUCTURES:  No  acute fracture or destructive osseous lesion.     IMPRESSION:     TVAR measurements as above     Low-lying right coronary artery     Impressive atrial dilation      Cardiac catheterization: 9/5/23  No significant coronary atherosclerosis. The RCA ostium is relatively low in the right sinus.     Carotid artery ultrasound: 8/24/23  Impression  RIGHT:  There is <50% stenosis noted in the internal carotid artery. Plaque is  heterogenous and irregular.  Vertebral artery flow is antegrade. There is no significant subclavian artery  disease.     LEFT:  There is <50% stenosis noted in the internal carotid artery. Plaque is  heterogenous and irregular.  Vertebral artery flow is antegrade. There is no significant subclavian artery  disease.    I have personally reviewed pertinent reports.        Impression/Plan:    Natividad Ortiz has symptomatic severe aortic stenosis. They have undergone preop testing. The results of these studies have been reviewed with the patient.     The risks, benefits, and alternatives to open AVR were discussed in detail with the patient today. They understand and wish to proceed with aortic valve replacement.  Informed consent was obtained and patient has been scheduled for AVR tissue with Dr. Mccullough on 2/19/24.       Natividad Ortiz was comfortable with our recommendations, and their questions were answered to their satisfaction.       SIGNATURE: Fay Nobles PA-C  DATE: January 31, 2024  TIME: 10:42 AM

## 2024-02-01 ENCOUNTER — TELEPHONE (OUTPATIENT)
Dept: NEUROLOGY | Facility: CLINIC | Age: 84
End: 2024-02-01

## 2024-02-01 NOTE — TELEPHONE ENCOUNTER
Patient schedule appointment with Dr Pugh for 2/7/24. She wants to speak with her about neuropathy and for personal reasons.

## 2024-02-02 ENCOUNTER — OFFICE VISIT (OUTPATIENT)
Dept: OBGYN CLINIC | Facility: CLINIC | Age: 84
End: 2024-02-02
Payer: MEDICARE

## 2024-02-02 VITALS
WEIGHT: 232.8 LBS | BODY MASS INDEX: 39.75 KG/M2 | HEIGHT: 64 IN | SYSTOLIC BLOOD PRESSURE: 114 MMHG | HEART RATE: 81 BPM | DIASTOLIC BLOOD PRESSURE: 76 MMHG

## 2024-02-02 DIAGNOSIS — G89.29 CHRONIC PAIN OF RIGHT KNEE: ICD-10-CM

## 2024-02-02 DIAGNOSIS — M25.561 CHRONIC PAIN OF RIGHT KNEE: ICD-10-CM

## 2024-02-02 DIAGNOSIS — E11.42 DIABETIC POLYNEUROPATHY ASSOCIATED WITH TYPE 2 DIABETES MELLITUS (HCC): ICD-10-CM

## 2024-02-02 DIAGNOSIS — R60.9 CHRONIC EDEMA: ICD-10-CM

## 2024-02-02 DIAGNOSIS — Z96.651 STATUS POST TOTAL RIGHT KNEE REPLACEMENT USING CEMENT: Primary | ICD-10-CM

## 2024-02-02 PROCEDURE — 99213 OFFICE O/P EST LOW 20 MIN: CPT | Performed by: ORTHOPAEDIC SURGERY

## 2024-02-02 NOTE — PROGRESS NOTES
Assessment/Plan:  1. Status post total right knee replacement using cement        2. Chronic pain of right knee        3. Diabetic polyneuropathy associated with type 2 diabetes mellitus (HCC)        4. Chronic edema          Scribe Attestation      I,:  Marshall Masterson PA-C am acting as a scribe while in the presence of the attending physician.:       I,:  Melquiades Sterling, DO personally performed the services described in this documentation    as scribed in my presence.:           Natividad is a very pleasant 83-year-old presenting today for follow-up of her right knee, which underwent total knee arthroplasty 23 months ago.  The wound of her right lower extremity has completely healed and she does not have any evidence of recurrent cellulitis in the knee.  Thankfully, she has not noted an increase in pain in the right knee since discontinuing antibiotics 3 to 4 weeks ago.  She does continue to have pain along the medial aspect which is worse in the morning but improves during the day.  Given this history and her exam today, there is a low suspicion for indolent infection in the right knee prosthesis.  Additionally, she is scheduled to undergo an aortic valve replacement in 2 weeks, which takes priority over the knee.  We did encourage her to utilize Voltaren gel up to 4 times a day over the pes bursa to help with discomfort.  We again do not recommend bracing due to her edema and propensity for wounds in the lower extremities.  We we will plan to see her back on an as-needed basis after she recovers from her upcoming open heart surgery.  She expressed understanding all of her questions were addressed    Subjective: Right knee follow-up    Patient ID: Natividad Ortiz is a 83 y.o. female presenting today for follow-up of her right knee in the setting of total knee arthroplasty approximately 23 months ago.  She was last seen 2 months ago.  Since then, the wound on her right lower extremity has healed.   Additionally, she has been off antibiotics for approximately 3 to 4 weeks.  She has not seen a significant increase in her right knee discomfort.  She reports that it is sore in the morning especially along the medial aspect of the knee.  She denies any fevers or chills she denies any other recent infections.  She did recently find out that she needs open heart surgery for an aortic valve replacement which is scheduled in 2 weeks    Review of Systems   Constitutional:  Positive for activity change.   HENT: Negative.     Eyes: Negative.    Respiratory: Negative.     Cardiovascular:  Positive for leg swelling.   Gastrointestinal: Negative.    Endocrine: Negative.    Genitourinary: Negative.    Musculoskeletal:  Positive for arthralgias, gait problem, joint swelling and myalgias.   Skin: Negative.    Allergic/Immunologic: Negative.    Hematological: Negative.    Psychiatric/Behavioral: Negative.       Past Medical History:   Diagnosis Date    Arthritis     Atrial fibrillation ([facility])     Carrero's esophagus     last assessed: 1/23/2018    BRCA1 negative     BRCA2 negative     Breast cancer ([facility]) 2006    stage 1 (left), given adjuvant radiation with Arimidex x 5 years    Cancer ([facility]) 2006    Left Breast, Lumpectomy    Cataract     last assessed: 3/11/2014    Chronic diastolic CHF (congestive heart failure) ([facility]) 11/21/2022    Dysplasia of toenail     last assessed: 8/29/2017    Esophageal reflux     GERD (gastroesophageal reflux disease)     Gross hematuria     last assessed: 2/19/2015    Hematuria     Hiatal hernia     History of radiation therapy     Hypertension     Irregular heart beat     AFIB    Mixed sensory-motor polyneuropathy     Neuropathy     Obesity     Pacemaker     Paroxysmal atrial fibrillation ([facility])     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    Peripheral neuropathy     Rectal bleeding     Restless leg syndrome     Shortness of breath     last assessed: 1/11/2016       Past Surgical History:    Procedure Laterality Date    BREAST BIOPSY Left 2006    BREAST LUMPECTOMY Left 2006    onset: 2006    BREAST SURGERY      CARDIAC CATHETERIZATION N/A 9/5/2023    Procedure: Cardiac RHC/LHC;  Surgeon: Patric England MD;  [facility];  Service: Cardiology    CARDIAC CATHETERIZATION N/A 9/5/2023    Procedure: Cardiac Coronary Angiogram;  Surgeon: Patric England MD;  [facility];  Service: Cardiology    CARDIAC CATHETERIZATION  9/5/2023    Procedure: Cardiac catheterization;  Surgeon: Patric England MD;  [facility];  Service: Cardiology    CARDIAC PACEMAKER PLACEMENT      x 3 2006    CATARACT EXTRACTION      COLONOSCOPY      CYSTOSCOPY  04/04/2014    diagnostic    HYSTERECTOMY      ALISON BSO; due to fibroid uterus; age 42    KNEE CARTILAGE SURGERY      excision lesion of meniscus or capsule knee    KNEE SURGERY      OOPHORECTOMY Bilateral     age 42    OTHER SURGICAL HISTORY      radiation therapy    SC ARTHRP KNE CONDYLE&PLATU MEDIAL&LAT COMPARTMENTS Left 08/17/2020    Procedure: ARTHROPLASTY KNEE TOTAL;  Surgeon: Melquiades Sterling DO;  Location: [facility];  Service: Orthopedics    SC ARTHRP KNE CONDYLE&PLATU MEDIAL&LAT COMPARTMENTS Right 03/28/2022    Procedure: ARTHROPLASTY KNEE TOTAL W ROBOT - RIGHT;  Surgeon: Melquiades Sterling DO;  Location: [facility];  Service: Orthopedics    SC COLONOSCOPY FLX DX W/COLLJ SPEC WHEN PFRMD N/A 02/08/2017    Procedure: COLONOSCOPY;  Surgeon: Desean Ham MD;  Location: [facility];  Service: Gastroenterology    SC ESOPHAGOGASTRODUODENOSCOPY TRANSORAL DIAGNOSTIC N/A 09/20/2017    Procedure: ESOPHAGOGASTRODUODENOSCOPY (EGD);  Surgeon: Jacob Morrell MD;  [facility]: [facility];  Service: Gastroenterology    UPPER GASTROINTESTINAL ENDOSCOPY         Family History   Problem Relation Age of Onset    Hypertension Mother     Heart disease Father     Aneurysm Father     Coronary artery disease Father         in his 70s with aneurysm    Aortic aneurysm  Father         abdominal    Scleroderma Sister     Breast cancer Sister 68    Hypertension Sister     Cancer Sister     No Known Problems Son     No Known Problems Son     Testicular cancer Son 41    Thyroid cancer Son 38    Colon cancer Maternal Aunt     Colon cancer Maternal Aunt     Breast cancer Other 50        kaylee's daughter    Alcohol abuse Neg Hx     Substance Abuse Neg Hx     Mental illness Neg Hx     Depression Neg Hx        Social History     Occupational History    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      Comment: retired   Tobacco Use    Smoking status: Former     Current packs/day: 0.00     Average packs/day: 1 pack/day for 25.0 years (25.0 ttl pk-yrs)     Types: Cigarettes     Start date: 1955     Quit date: 1980     Years since quittin.3    Smokeless tobacco: Never    Tobacco comments:     Quit over 30 years ago; quit age 47   Vaping Use    Vaping status: Never Used   Substance and Sexual Activity    Alcohol use: Not Currently    Drug use: No    Sexual activity: Not on file         Current Outpatient Medications:     ascorbic acid (VITAMIN C) 500 MG tablet, Take 500 mg by mouth 2 (two) times a day, Disp: , Rfl:     Calcium Acetate, Phos Binder, (CALCIUM ACETATE PO), Take by mouth daily  , Disp: , Rfl:     clobetasol (TEMOVATE) 0.05 % ointment, Apply once weekly to the affected area, Disp: 30 g, Rfl: 3    famotidine (PEPCID) 40 MG tablet, Take 1 tablet (40 mg total) by mouth every evening, Disp: 90 tablet, Rfl: 1    fluticasone (FLONASE) 50 mcg/act nasal spray, SPRAY 1 SPRAY INTO EACH NOSTRIL EVERY DAY, Disp: 48 mL, Rfl: 3    gabapentin (NEURONTIN) 800 mg tablet, TAKE 1 TABLET BY MOUTH FOUR TIMES A DAY, Disp: 360 tablet, Rfl: 1    loratadine (CLARITIN) 10 mg tablet, Take 10 mg by mouth daily As needed, Disp: , Rfl:     magnesium 30 MG tablet, Take 30 mg by mouth in the morning, Disp: , Rfl:     metoprolol tartrate (LOPRESSOR) 50 mg tablet, Take 2 tablets in the  a.m., 1 tablet in the p.m., Disp: , Rfl:     multivitamin (THERAGRAN) TABS, Take 1 tablet by mouth daily, Disp: , Rfl:     mupirocin (BACTROBAN) 2 % ointment, Apply topically 2 (two) times a day, Disp: 22 g, Rfl: 0    pantoprazole (PROTONIX) 20 mg tablet, Take 1 tablet (20 mg total) by mouth every morning, Disp: 90 tablet, Rfl: 2    pramipexole (MIRAPEX) 0.5 mg tablet, TAKE 2 FULL TABLETS TWICE A DAY AT 5:00 P.M. AND 10:00 P.M., Disp: 360 tablet, Rfl: 1    rivaroxaban (Xarelto) 20 mg tablet, Take 1 tablet (20 mg total) by mouth daily with breakfast, Disp: , Rfl:     torsemide (DEMADEX) 20 mg tablet, TAKE 1 TABLET BY MOUTH EVERY DAY, Disp: 90 tablet, Rfl: 3    Allergies   Allergen Reactions    Latex      Added based on information entered during case entry, please review and add reactions, type, and severity as needed    Duloxetine Hcl Other (See Comments)     Facial pins and needles sensation    Erythromycin Rash    Levofloxacin Other (See Comments)     Muscular aches    Penicillins Rash    Savella [Milnacipran] Rash    Sulfa Antibiotics Rash       Objective:  Vitals:    02/02/24 1434   BP: 114/76   Pulse: 81       Body mass index is 39.96 kg/m².    Right Knee Exam     Muscle Strength   The patient has normal right knee strength.    Tenderness   The patient is experiencing tenderness in the medial joint line and MCL.    Range of Motion   Extension:  0 normal   Flexion:  120 normal     Tests   Varus: positive Valgus: positive  Drawer:  Anterior - negative    Posterior - negative  Patellar apprehension: negative    Other   Erythema: absent  Scars: present  Sensation: normal  Pulse: present  Swelling: mild  Effusion: no effusion present    Comments:  Mild pitting edema right lower extremity compared to contralateral side, decreased from previous exam  Previous wound of right lower extremity has completely healed   1+ laxity with varus and valgus stressing at 30 degrees, no laxity at 0 and 90 degrees  No patellar clunk  appreciated  Tender over superficial and deep MCL and pes bursa          Observations     Right Knee   Negative for effusion.       Physical Exam  Vitals and nursing note reviewed.   Constitutional:       Appearance: Normal appearance. She is well-developed.      Comments: Body mass index is 39.96 kg/m².   HENT:      Head: Normocephalic and atraumatic.      Right Ear: External ear normal.      Left Ear: External ear normal.   Eyes:      Extraocular Movements: Extraocular movements intact.      Conjunctiva/sclera: Conjunctivae normal.   Cardiovascular:      Rate and Rhythm: Normal rate.      Pulses: Normal pulses.   Pulmonary:      Effort: Pulmonary effort is normal.   Musculoskeletal:      Cervical back: Normal range of motion.      Right knee: No effusion.      Comments: See ortho exam   Skin:     General: Skin is warm and dry.   Neurological:      General: No focal deficit present.      Mental Status: She is alert and oriented to person, place, and time. Mental status is at baseline.   Psychiatric:         Mood and Affect: Mood normal.         Behavior: Behavior normal.         Thought Content: Thought content normal.         Judgment: Judgment normal.       This document was created using speech voice recognition software.   Grammatical errors, random word insertions, pronoun errors, and incomplete sentences are an occasional consequence of this system due to software limitations, ambient noise, and hardware issues.   Any formal questions or concerns about content, text, or information contained within the body of this dictation should be directly addressed to the provider for clarification.

## 2024-02-05 ENCOUNTER — NURSE TRIAGE (OUTPATIENT)
Age: 84
End: 2024-02-05

## 2024-02-05 NOTE — TELEPHONE ENCOUNTER
Please call patient.  Ask if with fevers, sore throat, cough, breathing issues.  Can start using Flonase twice a day, saline rinse once a day.  If she would like to keep today's appt, she may.

## 2024-02-05 NOTE — TELEPHONE ENCOUNTER
"Reason for Disposition   Nasal discharge present > 10 days    Answer Assessment - Initial Assessment Questions  1. SYMPTOM: \"What's the main symptom you're concerned about?\" (e.g., runny nose, stuffiness, sneezing, itching)      Pnd ,clearing of throat   2. SEVERITY: \"How bad is it?\" \"What does it keep you from doing?\" (e.g., sleeping, working)   Daily activities   3. EYES: \"Are the eyes also red, watery, and itchy?\"       No   4. TRIGGER: \"What pollen or other allergic substance do you think is causing the symptoms?\"      unknown   5. TREATMENT: \"What medicine are you using?\" \"What medicine worked best in the past?\"      Flonase   6. OTHER SYMPTOMS: \"Do you have any other symptoms?\" (e.g., coughing, difficulty breathing, wheezing)     Mild sob, Pnd ,patient denies cough,difficulty breathing    Protocols used: Nasal Allergies (Hay Fever)-ADULT-[place]    "

## 2024-02-05 NOTE — TELEPHONE ENCOUNTER
Pt states she is not sick. Her concern is she is scheduled to have open heart surgery on 2/19/24 and has post nasal drip which makes her clear her throat really hard and puts a lot of pressure on her chest.  She is worried about harming the stitches after surgery.  She wants to know what you think about that.     No fever, cough, headache, or breathing issues.      She will use flonase 2 x daily and saline rinse.

## 2024-02-05 NOTE — TELEPHONE ENCOUNTER
Is your post nasal drip worse when you lay back to sleep or lean your head back?  The Flonase should help, use twice a day.  You can also use a saline rinse once a day, saline spray as needed.

## 2024-02-08 ENCOUNTER — TELEPHONE (OUTPATIENT)
Dept: CARDIOLOGY CLINIC | Facility: CLINIC | Age: 84
End: 2024-02-08

## 2024-02-08 ENCOUNTER — LAB REQUISITION (OUTPATIENT)
Dept: LAB | Facility: HOSPITAL | Age: 84
End: 2024-02-08
Payer: MEDICARE

## 2024-02-08 ENCOUNTER — APPOINTMENT (OUTPATIENT)
Dept: LAB | Facility: CLINIC | Age: 84
End: 2024-02-08
Payer: MEDICARE

## 2024-02-08 DIAGNOSIS — Z13.220 SCREENING FOR LIPOID DISORDERS: ICD-10-CM

## 2024-02-08 DIAGNOSIS — Z01.810 PRE-OPERATIVE CARDIOVASCULAR EXAMINATION: ICD-10-CM

## 2024-02-08 DIAGNOSIS — Z86.39 H/O: OBESITY: ICD-10-CM

## 2024-02-08 DIAGNOSIS — I35.0 NONRHEUMATIC AORTIC (VALVE) STENOSIS: ICD-10-CM

## 2024-02-08 DIAGNOSIS — Z01.812 ENCOUNTER FOR PRE-OPERATIVE LABORATORY TESTING: ICD-10-CM

## 2024-02-08 DIAGNOSIS — I35.0 NONRHEUMATIC AORTIC VALVE STENOSIS: ICD-10-CM

## 2024-02-08 DIAGNOSIS — Z13.1 ENCOUNTER FOR SCREENING FOR DIABETES MELLITUS: ICD-10-CM

## 2024-02-08 DIAGNOSIS — I48.19 PERSISTENT ATRIAL FIBRILLATION (HCC): ICD-10-CM

## 2024-02-08 LAB
ABO GROUP BLD: NORMAL
ANION GAP SERPL CALCULATED.3IONS-SCNC: 5 MMOL/L
BASOPHILS # BLD AUTO: 0.03 THOUSANDS/ÂΜL (ref 0–0.1)
BASOPHILS NFR BLD AUTO: 1 % (ref 0–1)
BLD GP AB SCN SERPL QL: NEGATIVE
BUN SERPL-MCNC: 33 MG/DL (ref 5–25)
CALCIUM SERPL-MCNC: 9.8 MG/DL (ref 8.4–10.2)
CHLORIDE SERPL-SCNC: 100 MMOL/L (ref 96–108)
CHOLEST SERPL-MCNC: 94 MG/DL
CO2 SERPL-SCNC: 34 MMOL/L (ref 21–32)
CREAT SERPL-MCNC: 1.06 MG/DL (ref 0.6–1.3)
EOSINOPHIL # BLD AUTO: 0 THOUSAND/ÂΜL (ref 0–0.61)
EOSINOPHIL NFR BLD AUTO: 0 % (ref 0–6)
ERYTHROCYTE [DISTWIDTH] IN BLOOD BY AUTOMATED COUNT: 14.9 % (ref 11.6–15.1)
EST. AVERAGE GLUCOSE BLD GHB EST-MCNC: 128 MG/DL
GFR SERPL CREATININE-BSD FRML MDRD: 49 ML/MIN/1.73SQ M
GLUCOSE P FAST SERPL-MCNC: 81 MG/DL (ref 65–99)
HBA1C MFR BLD: 6.1 %
HCT VFR BLD AUTO: 39.9 % (ref 34.8–46.1)
HDLC SERPL-MCNC: 33 MG/DL
HGB BLD-MCNC: 12.3 G/DL (ref 11.5–15.4)
IMM GRANULOCYTES # BLD AUTO: 0.02 THOUSAND/UL (ref 0–0.2)
IMM GRANULOCYTES NFR BLD AUTO: 0 % (ref 0–2)
INR PPP: 2.21 (ref 0.84–1.19)
LDLC SERPL CALC-MCNC: 48 MG/DL (ref 0–100)
LYMPHOCYTES # BLD AUTO: 1.68 THOUSANDS/ÂΜL (ref 0.6–4.47)
LYMPHOCYTES NFR BLD AUTO: 28 % (ref 14–44)
MCH RBC QN AUTO: 29.2 PG (ref 26.8–34.3)
MCHC RBC AUTO-ENTMCNC: 30.8 G/DL (ref 31.4–37.4)
MCV RBC AUTO: 95 FL (ref 82–98)
MONOCYTES # BLD AUTO: 0.54 THOUSAND/ÂΜL (ref 0.17–1.22)
MONOCYTES NFR BLD AUTO: 9 % (ref 4–12)
NEUTROPHILS # BLD AUTO: 3.82 THOUSANDS/ÂΜL (ref 1.85–7.62)
NEUTS SEG NFR BLD AUTO: 62 % (ref 43–75)
NRBC BLD AUTO-RTO: 0 /100 WBCS
PLATELET # BLD AUTO: 164 THOUSANDS/UL (ref 149–390)
PMV BLD AUTO: 11.2 FL (ref 8.9–12.7)
POTASSIUM SERPL-SCNC: 4.4 MMOL/L (ref 3.5–5.3)
PROTHROMBIN TIME: 25.4 SECONDS (ref 11.6–14.5)
RBC # BLD AUTO: 4.21 MILLION/UL (ref 3.81–5.12)
RH BLD: POSITIVE
SODIUM SERPL-SCNC: 139 MMOL/L (ref 135–147)
SPECIMEN EXPIRATION DATE: NORMAL
TRIGL SERPL-MCNC: 66 MG/DL
WBC # BLD AUTO: 6.09 THOUSAND/UL (ref 4.31–10.16)

## 2024-02-08 PROCEDURE — 86850 RBC ANTIBODY SCREEN: CPT | Performed by: PHYSICIAN ASSISTANT

## 2024-02-08 PROCEDURE — 80048 BASIC METABOLIC PNL TOTAL CA: CPT

## 2024-02-08 PROCEDURE — 85025 COMPLETE CBC W/AUTO DIFF WBC: CPT

## 2024-02-08 PROCEDURE — 80061 LIPID PANEL: CPT

## 2024-02-08 PROCEDURE — 36415 COLL VENOUS BLD VENIPUNCTURE: CPT

## 2024-02-08 PROCEDURE — 85610 PROTHROMBIN TIME: CPT

## 2024-02-08 PROCEDURE — 86901 BLOOD TYPING SEROLOGIC RH(D): CPT | Performed by: PHYSICIAN ASSISTANT

## 2024-02-08 PROCEDURE — 83036 HEMOGLOBIN GLYCOSYLATED A1C: CPT

## 2024-02-08 PROCEDURE — 86900 BLOOD TYPING SEROLOGIC ABO: CPT | Performed by: PHYSICIAN ASSISTANT

## 2024-02-08 PROCEDURE — 87081 CULTURE SCREEN ONLY: CPT

## 2024-02-08 NOTE — TELEPHONE ENCOUNTER
"Patient needs refill for Xarelto 20 mg 90 day supply sent to [facility] in [place], [place].  The pharmacist told her \"the provider canceled the prescription.\"  She is out of medication.  "

## 2024-02-09 ENCOUNTER — TELEPHONE (OUTPATIENT)
Dept: CARDIOLOGY CLINIC | Facility: CLINIC | Age: 84
End: 2024-02-09

## 2024-02-09 LAB — MRSA NOSE QL CULT: NORMAL

## 2024-02-09 NOTE — TELEPHONE ENCOUNTER
Natividad called about her script. Called and advised her it has been sent to pharmacy. Pt verbally understood

## 2024-02-12 ENCOUNTER — TELEPHONE (OUTPATIENT)
Dept: CARDIAC SURGERY | Facility: CLINIC | Age: 84
End: 2024-02-12

## 2024-02-12 DIAGNOSIS — Z01.810 PRE-OPERATIVE CARDIOVASCULAR EXAMINATION: ICD-10-CM

## 2024-02-12 DIAGNOSIS — I35.0 NONRHEUMATIC AORTIC VALVE STENOSIS: ICD-10-CM

## 2024-02-12 DIAGNOSIS — Z01.812 ENCOUNTER FOR PRE-OPERATIVE LABORATORY TESTING: ICD-10-CM

## 2024-02-12 NOTE — TELEPHONE ENCOUNTER
Patient called stating she lost her Mupirocin. She was supposed to start it Wednesday her procedure will be on Monday. Nurse practitioner reordered it for her. Pt was informed.

## 2024-02-18 ENCOUNTER — ANESTHESIA EVENT (INPATIENT)
Dept: PERIOP | Facility: HOSPITAL | Age: 84
End: 2024-02-18
Payer: MEDICARE

## 2024-02-18 NOTE — ANESTHESIA PREPROCEDURE EVALUATION
Procedure:  REPLACEMENT VALVE AORTIC (AVR) WITH TISSUE VALVE (Chest)    Relevant Problems   CARDIO   (+) Aortic stenosis, severe   (+) Atrial fibrillation (HCC) [I48.91]   (+) Essential hypertension      GI/HEPATIC   (+) GERD (gastroesophageal reflux disease)   (+) Hiatal hernia      /RENAL   (+) Stage 3a chronic kidney disease (HCC)      HEMATOLOGY   (+) Coagulopathy (HCC)      MUSCULOSKELETAL   (+) Arthritis   (+) Hiatal hernia      NEURO/PSYCH   (+) Chronic left shoulder pain   (+) Diabetic polyneuropathy associated with type 2 diabetes mellitus (HCC)      H/o left lumpectomy, radiation  pAF on xarelto    Metronic pacer, VVI    LHC: no sig CAD    TTE: normal biventricular systolic function, mod-sev AS, mild MR, mild-mod TR, mild PI    Cr 1.06, hgb 12.3, plt 164  A1c 6.1    Physical Exam    Airway    Mallampati score: II  TM Distance: >3 FB  Neck ROM: full     Dental       Cardiovascular      Pulmonary      Other Findings  Missing several, none loosepost-pubertal.      Anesthesia Plan  ASA Score- 4     Anesthesia Type- general with ASA Monitors.         Additional Monitors: arterial line, central venous line and pulmonary artery catheter.    Airway Plan: ETT.    Comment:   General anesthesia, endotracheal Intubation. Standard ASA monitors. Large bore peripheral IV. Pre-induction arterial line. CVP (Triple Lumen) and Cordis with PAC. HEATH for perioperative monitoring and diagnosis. Post-op ICU/vent. Risks and benefits discussed with patient; patient consented and agrees to proceed.  .       Plan Factors-    Chart reviewed.   Existing labs reviewed.                   Induction- intravenous.    Postoperative Plan-     Informed Consent- Anesthetic plan and risks discussed with patient.

## 2024-02-19 ENCOUNTER — ANESTHESIA (INPATIENT)
Dept: PERIOP | Facility: HOSPITAL | Age: 84
End: 2024-02-19
Payer: MEDICARE

## 2024-02-19 PROBLEM — Z95.2 S/P AVR: Status: ACTIVE | Noted: 2024-02-19

## 2024-02-19 RX ORDER — SODIUM CHLORIDE 9 MG/ML
INJECTION, SOLUTION INTRAVENOUS CONTINUOUS PRN
Status: DISCONTINUED | OUTPATIENT
Start: 2024-02-19 | End: 2024-02-19

## 2024-02-19 RX ORDER — AMINOCAPROIC ACID 250 MG/ML
INJECTION, SOLUTION INTRAVENOUS AS NEEDED
Status: DISCONTINUED | OUTPATIENT
Start: 2024-02-19 | End: 2024-02-19

## 2024-02-19 RX ORDER — PROPOFOL 10 MG/ML
INJECTION, EMULSION INTRAVENOUS AS NEEDED
Status: DISCONTINUED | OUTPATIENT
Start: 2024-02-19 | End: 2024-02-19

## 2024-02-19 RX ORDER — CALCIUM CHLORIDE 100 MG/ML
INJECTION INTRAVENOUS; INTRAVENTRICULAR AS NEEDED
Status: DISCONTINUED | OUTPATIENT
Start: 2024-02-19 | End: 2024-02-19

## 2024-02-19 RX ORDER — MANNITOL 250 MG/ML
INJECTION, SOLUTION INTRAVENOUS AS NEEDED
Status: DISCONTINUED | OUTPATIENT
Start: 2024-02-19 | End: 2024-02-19

## 2024-02-19 RX ORDER — LIDOCAINE HCL/PF 100 MG/5ML
SYRINGE (ML) INJECTION AS NEEDED
Status: DISCONTINUED | OUTPATIENT
Start: 2024-02-19 | End: 2024-02-19

## 2024-02-19 RX ORDER — FENTANYL CITRATE 50 UG/ML
INJECTION, SOLUTION INTRAMUSCULAR; INTRAVENOUS AS NEEDED
Status: DISCONTINUED | OUTPATIENT
Start: 2024-02-19 | End: 2024-02-19

## 2024-02-19 RX ORDER — SODIUM CHLORIDE, SODIUM LACTATE, POTASSIUM CHLORIDE, CALCIUM CHLORIDE 600; 310; 30; 20 MG/100ML; MG/100ML; MG/100ML; MG/100ML
INJECTION, SOLUTION INTRAVENOUS CONTINUOUS PRN
Status: DISCONTINUED | OUTPATIENT
Start: 2024-02-19 | End: 2024-02-19

## 2024-02-19 RX ORDER — ALBUMIN, HUMAN INJ 5% 5 %
SOLUTION INTRAVENOUS CONTINUOUS PRN
Status: DISCONTINUED | OUTPATIENT
Start: 2024-02-19 | End: 2024-02-19

## 2024-02-19 RX ORDER — GLYCOPYRROLATE 0.2 MG/ML
INJECTION INTRAMUSCULAR; INTRAVENOUS AS NEEDED
Status: DISCONTINUED | OUTPATIENT
Start: 2024-02-19 | End: 2024-02-19

## 2024-02-19 RX ORDER — MILRINONE LACTATE 0.2 MG/ML
INJECTION, SOLUTION INTRAVENOUS AS NEEDED
Status: DISCONTINUED | OUTPATIENT
Start: 2024-02-19 | End: 2024-02-19

## 2024-02-19 RX ORDER — LIDOCAINE HYDROCHLORIDE 10 MG/ML
INJECTION, SOLUTION EPIDURAL; INFILTRATION; INTRACAUDAL; PERINEURAL
Status: COMPLETED | OUTPATIENT
Start: 2024-02-19 | End: 2024-02-19

## 2024-02-19 RX ORDER — CEFAZOLIN SODIUM 1 G/3ML
INJECTION, POWDER, FOR SOLUTION INTRAMUSCULAR; INTRAVENOUS AS NEEDED
Status: DISCONTINUED | OUTPATIENT
Start: 2024-02-19 | End: 2024-02-19

## 2024-02-19 RX ORDER — SODIUM CHLORIDE, SODIUM GLUCONATE, SODIUM ACETATE, POTASSIUM CHLORIDE, MAGNESIUM CHLORIDE, SODIUM PHOSPHATE, DIBASIC, AND POTASSIUM PHOSPHATE .53; .5; .37; .037; .03; .012; .00082 G/100ML; G/100ML; G/100ML; G/100ML; G/100ML; G/100ML; G/100ML
INJECTION, SOLUTION INTRAVENOUS AS NEEDED
Status: DISCONTINUED | OUTPATIENT
Start: 2024-02-19 | End: 2024-02-19

## 2024-02-19 RX ORDER — NEOSTIGMINE METHYLSULFATE 1 MG/ML
INJECTION INTRAVENOUS AS NEEDED
Status: DISCONTINUED | OUTPATIENT
Start: 2024-02-19 | End: 2024-02-19

## 2024-02-19 RX ORDER — EPHEDRINE SULFATE 50 MG/ML
INJECTION INTRAVENOUS AS NEEDED
Status: DISCONTINUED | OUTPATIENT
Start: 2024-02-19 | End: 2024-02-19

## 2024-02-19 RX ORDER — PROTAMINE SULFATE 10 MG/ML
INJECTION, SOLUTION INTRAVENOUS AS NEEDED
Status: DISCONTINUED | OUTPATIENT
Start: 2024-02-19 | End: 2024-02-19

## 2024-02-19 RX ORDER — ONDANSETRON 2 MG/ML
INJECTION INTRAMUSCULAR; INTRAVENOUS AS NEEDED
Status: DISCONTINUED | OUTPATIENT
Start: 2024-02-19 | End: 2024-02-19

## 2024-02-19 RX ORDER — ROCURONIUM BROMIDE 10 MG/ML
INJECTION, SOLUTION INTRAVENOUS AS NEEDED
Status: DISCONTINUED | OUTPATIENT
Start: 2024-02-19 | End: 2024-02-19

## 2024-02-19 RX ORDER — MIDAZOLAM HYDROCHLORIDE 2 MG/2ML
INJECTION, SOLUTION INTRAMUSCULAR; INTRAVENOUS AS NEEDED
Status: DISCONTINUED | OUTPATIENT
Start: 2024-02-19 | End: 2024-02-19

## 2024-02-19 RX ADMIN — ROCURONIUM BROMIDE 50 MG: 10 INJECTION, SOLUTION INTRAVENOUS at 09:13

## 2024-02-19 RX ADMIN — PROPOFOL 30 MCG/KG/MIN: 10 INJECTION, EMULSION INTRAVENOUS at 09:55

## 2024-02-19 RX ADMIN — SODIUM CHLORIDE: 9 INJECTION, SOLUTION INTRAVENOUS at 08:30

## 2024-02-19 RX ADMIN — MILRINONE LACTATE IN DEXTROSE 1 MG: 200 INJECTION, SOLUTION INTRAVENOUS at 08:50

## 2024-02-19 RX ADMIN — SODIUM CHLORIDE: 0.9 INJECTION, SOLUTION INTRAVENOUS at 07:34

## 2024-02-19 RX ADMIN — ALBUMIN (HUMAN): 12.5 INJECTION, SOLUTION INTRAVENOUS at 10:27

## 2024-02-19 RX ADMIN — CALCIUM CHLORIDE 0.5 G: 100 INJECTION INTRAVENOUS; INTRAVENTRICULAR at 10:19

## 2024-02-19 RX ADMIN — SODIUM CHLORIDE 2 UNITS/HR: 9 INJECTION, SOLUTION INTRAVENOUS at 10:46

## 2024-02-19 RX ADMIN — CALCIUM CHLORIDE 1 G: 100 INJECTION INTRAVENOUS; INTRAVENTRICULAR at 10:06

## 2024-02-19 RX ADMIN — HEPARIN SODIUM 42400 UNITS: 1000 INJECTION INTRAVENOUS; SUBCUTANEOUS at 08:52

## 2024-02-19 RX ADMIN — LIDOCAINE HYDROCHLORIDE 0.5 ML: 10 INJECTION, SOLUTION EPIDURAL; INFILTRATION; INTRACAUDAL; PERINEURAL at 07:43

## 2024-02-19 RX ADMIN — SODIUM BICARBONATE 100 ML: 84 INJECTION, SOLUTION INTRAVENOUS at 09:26

## 2024-02-19 RX ADMIN — Medication 1 EACH: at 07:39

## 2024-02-19 RX ADMIN — PHENYLEPHRINE HYDROCHLORIDE 250 MCG: 10 INJECTION INTRAVENOUS at 09:34

## 2024-02-19 RX ADMIN — PROPOFOL 50 MG: 10 INJECTION, EMULSION INTRAVENOUS at 10:20

## 2024-02-19 RX ADMIN — PHENYLEPHRINE HYDROCHLORIDE 250 MCG: 10 INJECTION INTRAVENOUS at 09:12

## 2024-02-19 RX ADMIN — SODIUM CHLORIDE, SODIUM LACTATE, POTASSIUM CHLORIDE, AND CALCIUM CHLORIDE: .6; .31; .03; .02 INJECTION, SOLUTION INTRAVENOUS at 09:13

## 2024-02-19 RX ADMIN — AMINOCAPROIC ACID 5 G: 250 INJECTION, SOLUTION INTRAVENOUS at 08:25

## 2024-02-19 RX ADMIN — MAGNESIUM SULFATE HEPTAHYDRATE 2 G: 500 INJECTION, SOLUTION INTRAMUSCULAR; INTRAVENOUS at 09:57

## 2024-02-19 RX ADMIN — MIDAZOLAM 2 MG: 1 INJECTION INTRAMUSCULAR; INTRAVENOUS at 09:13

## 2024-02-19 RX ADMIN — ONDANSETRON 4 MG: 2 INJECTION INTRAMUSCULAR; INTRAVENOUS at 10:20

## 2024-02-19 RX ADMIN — ALBUMIN (HUMAN): 12.5 INJECTION, SOLUTION INTRAVENOUS at 10:23

## 2024-02-19 RX ADMIN — GLYCOPYRROLATE 0.8 MCG: 0.2 INJECTION, SOLUTION INTRAMUSCULAR; INTRAVENOUS at 10:53

## 2024-02-19 RX ADMIN — PHENYLEPHRINE HYDROCHLORIDE 250 MCG: 10 INJECTION INTRAVENOUS at 09:19

## 2024-02-19 RX ADMIN — FENTANYL CITRATE 500 MCG: 0.05 INJECTION, SOLUTION INTRAMUSCULAR; INTRAVENOUS at 07:48

## 2024-02-19 RX ADMIN — PHENYLEPHRINE HYDROCHLORIDE 250 MCG: 10 INJECTION INTRAVENOUS at 09:49

## 2024-02-19 RX ADMIN — PHENYLEPHRINE HYDROCHLORIDE 25 MCG/MIN: 10 INJECTION INTRAVENOUS at 10:32

## 2024-02-19 RX ADMIN — MANNITOL 25 G: 12.5 INJECTION, SOLUTION INTRAVENOUS at 08:51

## 2024-02-19 RX ADMIN — SODIUM BICARBONATE 750 ML: 84 INJECTION, SOLUTION INTRAVENOUS at 09:15

## 2024-02-19 RX ADMIN — HEPARIN SODIUM 5000 UNITS: 1000 INJECTION INTRAVENOUS; SUBCUTANEOUS at 08:51

## 2024-02-19 RX ADMIN — MIDAZOLAM 2 MG: 1 INJECTION INTRAMUSCULAR; INTRAVENOUS at 07:36

## 2024-02-19 RX ADMIN — SODIUM CHLORIDE, SODIUM GLUCONATE, SODIUM ACETATE, POTASSIUM CHLORIDE, MAGNESIUM CHLORIDE, SODIUM PHOSPHATE, DIBASIC, AND POTASSIUM PHOSPHATE 500 ML: .53; .5; .37; .037; .03; .012; .00082 INJECTION, SOLUTION INTRAVENOUS at 07:39

## 2024-02-19 RX ADMIN — EPINEPHRINE 2 MCG/MIN: 1 INJECTION, SOLUTION, CONCENTRATE INTRAVENOUS at 10:01

## 2024-02-19 RX ADMIN — ROCURONIUM BROMIDE 100 MG: 10 INJECTION, SOLUTION INTRAVENOUS at 07:48

## 2024-02-19 RX ADMIN — AMIODARONE HYDROCHLORIDE 150 MG: 50 INJECTION, SOLUTION INTRAVENOUS at 09:59

## 2024-02-19 RX ADMIN — MILRINONE LACTATE IN DEXTROSE 0.25 MCG/KG/MIN: 200 INJECTION, SOLUTION INTRAVENOUS at 09:10

## 2024-02-19 RX ADMIN — VASOPRESSIN 0.04 UNITS/MIN: 20 INJECTION INTRAVENOUS at 10:19

## 2024-02-19 RX ADMIN — EPHEDRINE SULFATE 5 MG: 50 INJECTION INTRAVENOUS at 10:19

## 2024-02-19 RX ADMIN — MIDAZOLAM 6 MG: 1 INJECTION INTRAMUSCULAR; INTRAVENOUS at 07:48

## 2024-02-19 RX ADMIN — PHENYLEPHRINE HYDROCHLORIDE 250 MCG: 10 INJECTION INTRAVENOUS at 09:28

## 2024-02-19 RX ADMIN — AMINOCAPROIC ACID 2 G/HR: 250 INJECTION, SOLUTION INTRAVENOUS at 08:30

## 2024-02-19 RX ADMIN — PROTAMINE SULFATE 250 MG: 10 INJECTION, SOLUTION INTRAVENOUS at 10:20

## 2024-02-19 RX ADMIN — CEFAZOLIN 2000 MG: 1 INJECTION, POWDER, FOR SOLUTION INTRAMUSCULAR; INTRAVENOUS at 08:25

## 2024-02-19 RX ADMIN — SODIUM BICARBONATE 50 MEQ: 84 INJECTION, SOLUTION INTRAVENOUS at 08:51

## 2024-02-19 RX ADMIN — PROPOFOL 50 MG: 10 INJECTION, EMULSION INTRAVENOUS at 08:46

## 2024-02-19 RX ADMIN — LIDOCAINE HYDROCHLORIDE 100 MG: 20 INJECTION INTRAVENOUS at 09:58

## 2024-02-19 RX ADMIN — ALBUMIN (HUMAN): 12.5 INJECTION, SOLUTION INTRAVENOUS at 10:18

## 2024-02-19 RX ADMIN — CEFAZOLIN 2000 MG: 1 INJECTION, POWDER, FOR SOLUTION INTRAMUSCULAR; INTRAVENOUS at 10:33

## 2024-02-19 RX ADMIN — NEOSTIGMINE METHYLSULFATE 4 MG: 1 INJECTION INTRAVENOUS at 10:53

## 2024-02-19 RX ADMIN — HEPARIN SODIUM 3000 UNITS: 1000 INJECTION INTRAVENOUS; SUBCUTANEOUS at 09:54

## 2024-02-19 RX ADMIN — FENTANYL CITRATE 150 MCG: 0.05 INJECTION, SOLUTION INTRAMUSCULAR; INTRAVENOUS at 10:20

## 2024-02-19 RX ADMIN — FENTANYL CITRATE 100 MCG: 0.05 INJECTION, SOLUTION INTRAMUSCULAR; INTRAVENOUS at 11:04

## 2024-02-19 RX ADMIN — EPHEDRINE SULFATE 5 MG: 50 INJECTION INTRAVENOUS at 10:32

## 2024-02-19 RX ADMIN — SODIUM BICARBONATE 250 ML: 84 INJECTION, SOLUTION INTRAVENOUS at 09:25

## 2024-02-19 RX ADMIN — SODIUM CHLORIDE, SODIUM LACTATE, POTASSIUM CHLORIDE, CALCIUM CHLORIDE: 600; 310; 30; 20 INJECTION, SOLUTION INTRAVENOUS at 08:30

## 2024-02-19 RX ADMIN — FENTANYL CITRATE 500 MCG: 0.05 INJECTION, SOLUTION INTRAMUSCULAR; INTRAVENOUS at 08:45

## 2024-02-19 RX ADMIN — MILRINONE LACTATE IN DEXTROSE 1 MG: 200 INJECTION, SOLUTION INTRAVENOUS at 09:00

## 2024-02-19 NOTE — ANESTHESIA POSTPROCEDURE EVALUATION
Post-Op Assessment Note    CV Status:  Stable        Airway: intubated    No anethesia notable event occurred.    Staff: Anesthesiologist   Comments: see intra-op quick note for details of ICU hand off    Reason for prolonged intubation > 24 hours:  ETT managemment by ICUReason for prolonged intubation > 48 hours:  ETT managemment by ICU          BP      Temp      Pulse     Resp      SpO2

## 2024-02-19 NOTE — INTERVAL H&P NOTE
H&P reviewed. After examining the patient I find no changes in the patients condition since the H&P had been written.    Vitals:    02/19/24 0539   BP: 120/87   Pulse: 96   Resp: 18   Temp: 97.9 °F (36.6 °C)   SpO2: 100%       Anticipated Length of Stay:  Patient will be admitted on an Inpatient basis with an anticipated length of stay of  greater than 2 midnights.       Justification for Hospital Stay:  Post surgical recovery following open heart surgery.

## 2024-02-19 NOTE — ANESTHESIA PROCEDURE NOTES
Arterial Line Insertion    Performed by: Armen Gutierrez MD  Authorized by: Armen Gutierrez MD  Consent: Verbal consent obtained. Written consent obtained.  Risks and benefits: risks, benefits and alternatives were discussed  Consent given by: patient  Patient understanding: patient states understanding of the procedure being performed  Patient consent: the patient's understanding of the procedure matches consent given  Procedure consent: procedure consent matches procedure scheduled  Relevant documents: relevant documents present and verified  Required items: required blood products, implants, devices, and special equipment available  Patient identity confirmed: arm band  Preparation: Patient was prepped and draped in the usual sterile fashion.  Indications: multiple ABGs and hemodynamic monitoring  Orientation:  Right  Location: radial arterylidocaine (PF) (XYLOCAINE-MPF) 1 % - Infiltration   0.5 mL - 2/19/2024 7:43:00 AM  Sedation:  Patient sedated: yes  Sedatives: see MAR for details  Analgesia: see MAR for details  Vitals: Vital signs were monitored during sedation.    Procedure Details:  Needle gauge: 20  Seldinger technique: Seldinger technique used  Number of attempts: 1    Post-procedure:  Post-procedure: dressing applied  Waveform: waveform confirmed  Post-procedure CNS: normal and unchanged  Patient tolerance: patient tolerated the procedure well with no immediate complications  Comments: Pre-induction

## 2024-02-19 NOTE — ANESTHESIA PROCEDURE NOTES
Introducer/Thurman-Trish    Performed by: Armen Gutierrez MD  Authorized by: Armen Gutierrez MD    Date/Time: 2/19/2024 8:09 AM  Consent: Verbal consent obtained. Written consent obtained.  Risks and benefits: risks, benefits and alternatives were discussed  Consent given by: patient  Patient understanding: patient states understanding of the procedure being performed  Patient consent: the patient's understanding of the procedure matches consent given  Procedure consent: procedure consent matches procedure scheduled  Relevant documents: relevant documents present and verified  Required items: required blood products, implants, devices, and special equipment available  Patient identity confirmed: arm band  Indications: vascular access and central pressure monitoring  Location details: right internal jugular  Catheter size: 9 Fr  Patient position: Trendelenburg  Anesthesia method: ga.  Assessment: blood return through all ports and free fluid flow  Preparation: skin prepped with ChloraPrep  Skin prep agent dried: skin prep agent completely dried prior to procedure  Sterile barriers: all five maximum sterile barriers used - cap, mask, sterile gown, sterile gloves, and large sterile sheet  Hand hygiene: hand hygiene performed prior to central venous catheter insertion  Ultrasound guidance: yes  ultrasound permanent image saved  Site selection rationale: avr  Number of attempts: 1  Successful placement: yes  Post-procedure: line sutured and dressing applied  Patient tolerance: patient tolerated the procedure well with no immediate complications

## 2024-02-19 NOTE — ANESTHESIA PROCEDURE NOTES
Central Line Insertion    Performed by: Armen Gutierrez MD  Authorized by: Armen Gutierrez MD    Date/Time: 2/19/2024 8:10 AM  Catheter Type:  triple lumen  Consent: Verbal consent obtained. Written consent obtained.  Risks and benefits: risks, benefits and alternatives were discussed  Consent given by: patient  Patient understanding: patient states understanding of the procedure being performed  Patient consent: the patient's understanding of the procedure matches consent given  Procedure consent: procedure consent matches procedure scheduled  Relevant documents: relevant documents present and verified  Required items: required blood products, implants, devices, and special equipment available  Patient identity confirmed: arm band  Indications: central pressure monitoring and vascular access  Catheter size: 7 Fr  Patient position: Trendelenburg  Anesthesia method: ga.  Assessment: blood return through all ports and free fluid flow  Preparation: skin prepped with ChloraPrep  Skin prep agent dried: skin prep agent completely dried prior to procedure  Sterile barriers: all five maximum sterile barriers used - cap, mask, sterile gown, sterile gloves, and large sterile sheet  Hand hygiene: hand hygiene performed prior to central venous catheter insertion  sterile gel and probe cover used in ultrasound-guided central venous catheter insertionultrasound permanent image saved  Vessel of Catheter Tip End: central venous  Number of attempts: 1  Successful placement: yes  Post-procedure: chlorhexidine patch applied, dressing applied and line sutured  Patient tolerance: patient tolerated the procedure well with no immediate complications

## 2024-02-19 NOTE — OP NOTE
OPERATIVE REPORT  PATIENT NAME: Natividad Ortiz    :  1940  MRN: [id number]  Pt Location:  OR ROOM 16    SURGERY DATE: 2024     SURGEON: Liang Mccullough Jr, DO    ASSISTANT: Yari Peñaloza PA-C    ADDITIONAL ASSISTANT: N/A    PREOPERATIVE DIAGNOSIS: Symptomatic severe aortic stenosis    POSTOPERATIVE DIAGNOSIS:  Symptomatic severe aortic stenosis    NYHA Class: 3  CCS Class: 3    PROCEDURE:   Aortic valve replacement with a 21mm Echevarria Inspiris Resilia pericardial tissue valve    CARDIOPULMONARY BYPASS TIME: 58 minutes.   CROSSCLAMP TIME: 45 minutes.    ANESTHESIA: General endotracheal anesthesia with transesophageal echocardiogram guidance, Dr. Armen Gutierrez     INDICATIONS:  The patient is a 83 y.o. year-old female with clinical and echocardiographic findings consistent with Symptomatic severe aortic stenosis.  During her preoperative TAVR workup, it was found that the right coronary ostium was low to the annulus and could be obstructed during deployment.  Therefore open surgical AVR was suggested.    FINDINGS:  1. Intraoperative transesophageal echocardiogram revealed trileaflet valve with severe aortic stenosis.  2. Epiaortic ultrasound showed less than 5 mm non-mobile plaque  3. Aortic valve was trileaflet, heavily calcified  4. Final transesophageal echocardiogram demonstrated prosthetic valve with normal function without evidence of perivalvular leak, normal biventricular function.  5.  Right coronary ostium was close to the annulus, and would likely have been obstructed during TAVR.      OPERATIVE TECHNIQUE:    The patient was taken to the operating room and placed supine on the operating table. Following the satisfactory induction of general anesthesia and placement of monitoring lines, the patient was prepped and draped in the usual sterile fashion. A time-out procedure was performed.    The patient underwent a median sternotomy, pericardiotomy, and systemic heparinization. Epiaortic  ultrasound showed less than 5 mm non-mobile plaque. Cannulation for cardiopulmonary bypass was performed with an arterial dispersion cannula, double stage venous cannula and a vented antegrade cardioplegia cannula. Once the ACT was greater than 480 seconds we placed the patient on cardiopulmonary bypass, the ascending aorta was crossclamped and cardiac arrest was obtained without complications with [place] cardioplegia. A left ventricular vent was placed through the right superior pulmonary vein while giving cardioplegia. A CO2 flooded field was used during the entire case.    Aortotomy was performed and the aortic valve was exposed. The aortic valve was found to be trileaflet, heavily calcified. The leaflets were excised and the annulus was debrided of calcium. The annulus was sized, I choose a 21mm Echevarria Inspiris Resilia pericardial tissue valve. Pledgeted 2-0 Ethibond sutures were placed around the aortic valve annulus. The prosthetic valve was brought to the field, the sutures were passed through the sewing ring, the prosthetic valve was seated in a supra annular fashion and the sutures were tied down. Clearance of the coronary ostia was confirmed.  The right coronary ostium takeoff was close to the annulus.  Extensive irrigation of the aortic root and LVOT was performed to remove any debris.  While de-airing the heart, the aortotomy was closed with two layers of running 4-0 Prolene suture over felt strips, given the fact that the ascending aorta was significantly thin.     The heart was extensively de-aired, a dose of warm blood was delivered antegrade in the aortic root, and the crossclamp was then removed. Atrial and ventricular pacing wires were placed. Following a period of reperfusion, the patient was weaned from cardiopulmonary bypass and decannulated.  During decannulation the aortic cannulation site required multiple repair sutures and partial occlusion clamp to aid in repair.  Protamine was  administered with normalization of the ACT.  She was given an additional dose of platelets and hemostasis was confirmed in all fields. Thoracostomy tubes were placed. The sternum was closed with stainless steel wires. The fascia, subdermis and skin were closed with multiple layers of running absorbable suture.    The assistance of a PA was required to complete this case, specifically for assistance with cannulation, decannulation, and exposure of the aortic valve.    COMPLICATIONS: None    PACKS/TUBES/DRAINS: Chest tubes x 2.     EBL: 350mL.    TRANSFUSION: 1 Plts.     SPECIMENS: Aortic Valve.       As the attending surgeon, I was present and scrubbed for all critical portions of this procedure. There was no qualified surgical resident available. Sponge, needle and instrument counts were reported as correct by the nursing staff.        SIGNATURE: Liang Mccullough DO  DATE: February 19, 2024  TIME: 3:27 PM

## 2024-02-19 NOTE — CONSULTS
[facility]  Consult: Critical Care   Date of Service: 2/19/2024  Hospital Day: 0  Name: Natividad Ortiz I  MRN: [id number]  Unit/Bed#: [facility] 415-01    Inpatient consult to Medical Critical Care  Consult performed by: Aamir Jacobo MD  Consult ordered by: Yari Peñaloza PA-C        Assessment/Plan     Impressions:  Severe AS s/p Surgical AVR  PAF on Xarelto s/p 2005 ablation and multiple failed cardioversion  SSS s/p PPM   chronic diastolic CHF  GERD  Barretts esophagus  CKD3a  multiple lung nodules  venous insufficiency  L breast Ca s/p lumpectomy/radiation    Neuro:   Discontinue continuous sedation.   ATC tylenol, PRN oxycodone for pain  Trend neuro exam  Delirium precautions    CV:   Goals:   Cardiac Surgery Hemodynamic Monitoring goals: MAP goal >65  Volume resuscitation as needed.   Epicardial pacing wire plan : Epicardial pacing wires in place. Will pace as needed for bradycardia or cardiac output  currently V paced  Monitor rhythm on telemetry.        Lung:   Check STAT post-op ABG and CXR  Wean vent with spontaneous breathing trial with goal to extubate today    GI:   GI prophylaxis with PPI  Bowel regimen  Zofran PRN for nausea.     FEN:   NPO Replenish K >4.0, mag >2.0 and calcium >7.0.     :   Check STAT post-op BMP  Ledesma in place.   Monitor UOP with goal >0.5cc/kg/hour.  Lasix versus volume resuscitate as needed depending on hemodynamics and volume status.    ID:   Prophylactic post-op abx. Maintain normothermia. Trend temps.     Heme:   Check STAT post-op H/H and platelets. Hgb stable at 11.7, plt 142  Preop INR 1.5  Given 1 unit platelets, FFP, cryo  Monitor incision site, invasive lines, and chest tube outputs for bleeding. Send coag panel if needed.    Endo:   Insulin gtt for blood sugar control.     Disposition: ICU Care        Subjective     HPI: Natividad Ortiz is a 83 y.o. female PMhx of HTN, PAF on Xarelto s/p 2005 ablation and multiple failed  cardioversion, SSS s/p PPM, chronic diastolic CHF, GERD, Barretts, CKD3a, multiple lung nodules, venous insufficiency, L breast Ca s/p lumpectomy/radiation, presents 2/19 for TAVR.   Previous Echo with Severe AS. Pt had chest discomfort, dyspnea on exertion, orthopnea. It was noted that she had a Low takeoff of the ostium of the right coronary artery.  This is concerning for possible obstruction, and required surgical AVR.     Intra-op HEATH LVEF 60%  Post-op medications: Critical Care Infusions : Levophed 0 mcg/min, Vasopressin 0.04 Units/min, Neosynephrine 25 mcg/min, Epinephrine 2 mcg/min, and Primacor 0.25 mcg/kg/min    ROS: ROS unable to be obtained due to patient being intubated and sedated.     History obtained from chart review due to patient being intubated and sedated.        Objective          Vitals: Invasive Monitoring      /87   Pulse 96   Resp 18   O2 Sat 100 %   O2 Device Nasal cannula   Temp 97.9 °F (36.6 °C)    Arterial Line  [place] BP   100/50 No data recorded   MAP   64 No data recorded     PA Catheter   Most Recent  Min/Max in 24hrs    PAP  25 No data recorded   CVP  17 No data recorded   CI   2.21 No data recorded   SVR  1050 No data recorded          Physical Exam   Physical Exam  Vitals reviewed.   Eyes:      Conjunctiva/sclera: Conjunctivae normal.   Skin:     General: Skin is warm.   HENT:      Head: Normocephalic and atraumatic.   Neck:      Vascular: Central line present.   Cardiovascular:      Rate and Rhythm: Normal rate. Paced rhythm.      Pulses: Normal pulses.      Heart sounds: Normal heart sounds.      Comments: CT with 400 cc output  Musculoskeletal:      Right lower leg: Edema present.      Left lower leg: Edema present.      Comments: R radial line   Abdominal:      Palpations: Abdomen is soft.      Tenderness: There is no abdominal tenderness.   Constitutional:       Appearance: She is well-developed and well-nourished.      Interventions: She is sedated and intubated.    Pulmonary:      Effort: Pulmonary effort is normal. She is intubated.      Breath sounds: Normal breath sounds.   Neurological:      Comments: sedated   Genitourinary/Anorectal:  Ledesma present.        Diagnostic Studies      02/19/24 CXR: cardiomegaly, no infiltrates, unchanged from April CXR  This was personally reviewed by myself in PACS.  02/19/24 EKG: V paced at 96 bpm.   This was personally reviewed by myself.       Medications:  Scheduled PRN   acetaminophen, 650 mg, Q6H While awake  amiodarone, 200 mg, Q8H ANTON  ascorbic acid, 500 mg, BID  aspirin, 325 mg, Daily  atorvastatin, 80 mg, Daily With Dinner  cefazolin, 2,000 mg, Q8H  chlorhexidine, 15 mL, BID  fentanyl citrate (PF), 50 mcg, Once  [START ON 2/20/2024] fondaparinux, 2.5 mg, Daily  gabapentin, 800 mg, TID  magnesium sulfate, 2 g, Once  mupirocin, 1 Application, Q12H ANTON  pantoprazole, 40 mg, Early Morning  polyethylene glycol, 17 g, Daily  pramipexole, 1 mg, HS      acetaminophen, 650 mg, Q4H PRN  bisacodyl, 10 mg, Daily PRN  calcium chloride, 1 g, Once PRN  fentanyl citrate (PF), 50 mcg, Q1H PRN  HYDROmorphone, 0.5 mg, Q2H PRN  lactated ringers, 500 mL, Once PRN  ondansetron, 4 mg, Q6H PRN  oxyCODONE, 2.5 mg, Q4H PRN   Or  oxyCODONE, 5 mg, Q4H PRN  potassium chloride, 20 mEq, Once PRN  potassium chloride, 20 mEq, Q30 Min PRN  potassium chloride, 40 mEq, Q1H PRN       Continuous    aminocaproic acid (AMICAR) 5 g in sodium chloride 0.9 % 250 mL infusion, 1 g/hr, Last Rate: 1 g/hr (02/19/24 1154)  epinephrine, 1-10 mcg/min, Last Rate: 2 mcg/min (02/19/24 1120)  insulin regular (HumuLIN R,NovoLIN R) 1 Units/mL in sodium chloride 0.9 % 100 mL infusion, 0.3-21 Units/hr, Last Rate: 3 Units/hr (02/19/24 1134)  milrinone (Primacor) infusion, 0.25 mcg/kg/min, Last Rate: 0.25 mcg/kg/min (02/19/24 1120)  niCARdipine, 2.5-15 mg/hr  phenylephine,  mcg/min, Last Rate: 80 mcg/min (02/19/24 1206)  sodium chloride, 20 mL/hr, Last Rate: 20 mL/hr (02/19/24  1136)  vasopressin, 0.04 Units/min, Last Rate: 0.04 Units/min (02/19/24 1120)         Labs:  CBC    Recent Labs     02/19/24  0950 02/19/24  1004 02/19/24  1042 02/19/24  1131   HGB  --    < > 9.5* 11.7   HCT  --    < > 28* 38.2   *  --   --  142*    < > = values in this interval not displayed.     BMP    Recent Labs     02/19/24  1004 02/19/24  1042   CO2 32 27       Coags    Recent Labs     02/19/24  0547   INR 1.50*        Additional Electrolytes  Recent Labs     02/19/24  1004 02/19/24  1042   CAIONIZED 1.24 1.24          Blood Gas  pH 7.36, pCO2 50,   No recent results  No recent results LFTs  No recent results    Infectious    No recent results     No recent results Glucose insulin drip  No recent results       Invasive lines and devices:  Invasive Devices       Central Venous Catheter Line  Duration             CVC Central Lines 02/19/24 <1 day    Introducer 02/19/24 <1 day              Peripheral Intravenous Line  Duration             Peripheral IV 02/19/24 Left Antecubital <1 day              Arterial Line  Duration             Arterial Line 02/19/24 Right Radial <1 day              Line  Duration             Pacer Wires <1 day    Pacer Wires <1 day              Drain  Duration             Chest Tube 1 Other (Comment) Mediastinal;Posterior 32 Fr. <1 day    Chest Tube 2 Other (Comment) Mediastinal;Anterior 32 Fr. <1 day    Urethral Catheter Non-latex;Temperature probe 16 Fr. <1 day              Airway  Duration             ETT  Hi-Lo;Cuffed;Oral 7.5 mm <1 day                     Historical Information   Past Medical History:  No date: Arthritis  No date: Atrial fibrillation (HCC)  No date: Carrero's esophagus      Comment:  last assessed: 1/23/2018  No date: BRCA1 negative  No date: BRCA2 negative  2006: Breast cancer (HCC)      Comment:  stage 1 (left), given adjuvant radiation with Arimidex x               5 years  2006: Cancer (HCC)      Comment:  Left Breast, Lumpectomy  No date: Cataract       Comment:  last assessed: 3/11/2014  11/21/2022: Chronic diastolic CHF (congestive heart failure) ([facility])  No date: Dysplasia of toenail      Comment:  last assessed: 8/29/2017  No date: Esophageal reflux  No date: GERD (gastroesophageal reflux disease)  No date: Gross hematuria      Comment:  last assessed: 2/19/2015  No date: Hematuria  No date: Hiatal hernia  No date: History of radiation therapy  No date: Hypertension  No date: Irregular heart beat      Comment:  AFIB  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  No date: Neuropathy  No date: Obesity  No date: Pacemaker  No date: Paroxysmal atrial fibrillation (HCC)  02/13/2018: 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[facility])  No date: Peripheral neuropathy  No date: Rectal bleeding  No date: Restless leg syndrome  No date: Shortness of breath      Comment:  last assessed: 1/11/2016 Past Surgical History:  2006: BREAST BIOPSY; Left  2006: BREAST LUMPECTOMY; Left      Comment:  onset: 2006  No date: BREAST SURGERY  9/5/2023: CARDIAC CATHETERIZATION; N/A      Comment:  Procedure: Cardiac RHC/LHC;  Surgeon: Patric England MD;                 [facility]: [facility];  Service: Cardiology  9/5/2023: CARDIAC CATHETERIZATION; N/A      Comment:  Procedure: Cardiac Coronary Angiogram;  Surgeon: Patric England MD;  [facility];  Service:                Cardiology  9/5/2023: CARDIAC CATHETERIZATION      Comment:  Procedure: Cardiac catheterization;  Surgeon: Patric England MD;  [facility];  Service:                Cardiology  No date: CARDIAC PACEMAKER PLACEMENT      Comment:  x 3 2006  No date: CATARACT EXTRACTION  No date: COLONOSCOPY  04/04/2014: CYSTOSCOPY      Comment:  diagnostic  No date: HYSTERECTOMY      Comment:  ALISON BSO; due to fibroid uterus; age 42  No date: KNEE CARTILAGE SURGERY      Comment:  excision lesion of meniscus or capsule knee  No date: KNEE SURGERY  No date: OOPHORECTOMY; Bilateral      Comment:   age 42  No date: OTHER SURGICAL HISTORY      Comment:  radiation therapy  08/17/2020: RI ARTHRP KNE CONDYLE&PLATU MEDIAL&LAT COMPARTMENTS; Left      Comment:  Procedure: ARTHROPLASTY KNEE TOTAL;  Surgeon: Melquiades Sterling DO;  Location: [facility];  Service: Orthopedics  03/28/2022: RI ARTHRP KNE CONDYLE&PLATU MEDIAL&LAT COMPARTMENTS; Right      Comment:  Procedure: ARTHROPLASTY KNEE TOTAL W ROBOT - RIGHT;                 Surgeon: Melquiades Sterling DO;  Location: [facility];                 Service: Orthopedics  02/08/2017: RI COLONOSCOPY FLX DX W/COLLJ SPEC WHEN PFRMD; N/A      Comment:  Procedure: COLONOSCOPY;  Surgeon: Desean Ham MD;                 Location:  GI LAB;  Service: Gastroenterology  09/20/2017: RI ESOPHAGOGASTRODUODENOSCOPY TRANSORAL DIAGNOSTIC; N/A      Comment:  Procedure: ESOPHAGOGASTRODUODENOSCOPY (EGD);  Surgeon:                Jacob Morrell MD;  Location: [facility];  Service:                Gastroenterology  No date: UPPER GASTROINTESTINAL ENDOSCOPY   Current Outpatient Medications   Medication Instructions    ascorbic acid (VITAMIN C) 500 mg, Oral, 2 times daily    Calcium Acetate, Phos Binder, (CALCIUM ACETATE PO) Oral, Daily    clobetasol (TEMOVATE) 0.05 % ointment Apply once weekly to the affected area    famotidine (PEPCID) 40 mg, Oral, Every evening    fluticasone (FLONASE) 50 mcg/act nasal spray SPRAY 1 SPRAY INTO EACH NOSTRIL EVERY DAY    gabapentin (NEURONTIN) 800 mg tablet TAKE 1 TABLET BY MOUTH FOUR TIMES A DAY    loratadine (CLARITIN) 10 mg, Oral, Daily, As needed    magnesium 30 mg, Oral, Daily    metoprolol tartrate (LOPRESSOR) 50 mg tablet Take 2 tablets in the a.m., 1 tablet in the p.m.    multivitamin (THERAGRAN) TABS 1 tablet, Oral, Daily    mupirocin (BACTROBAN) 2 % ointment Topical, 2 times daily    pantoprazole (PROTONIX) 20 mg, Oral, Every morning    pramipexole (MIRAPEX) 0.5 mg tablet TAKE 2 FULL TABLETS TWICE A DAY AT 5:00 P.M. AND 10:00 P.M.    rivaroxaban  (XARELTO) 20 mg, Oral, Daily with breakfast    torsemide (DEMADEX) 20 mg, Oral, Daily    Allergies   Allergen Reactions    Latex      Added based on information entered during case entry, please review and add reactions, type, and severity as needed    Duloxetine Hcl Other (See Comments)     Facial pins and needles sensation    Erythromycin Rash    Levofloxacin Other (See Comments)     Muscular aches    Penicillins Rash    Savella [Milnacipran] Rash    Sulfa Antibiotics Rash      Social History     Tobacco Use    Smoking status: Former     Current packs/day: 0.00     Average packs/day: 1 pack/day for 25.0 years (25.0 ttl pk-yrs)     Types: Cigarettes     Start date: 1955     Quit date: 1980     Years since quittin.4    Smokeless tobacco: Never    Tobacco comments:     Quit over 30 years ago; quit age 47   Vaping Use    Vaping status: Never Used   Substance Use Topics    Alcohol use: Not Currently    Drug use: No    Family History   Problem Relation Age of Onset    Hypertension Mother     Heart disease Father     Aneurysm Father     Coronary artery disease Father         in his 70s with aneurysm    Aortic aneurysm Father         abdominal    Scleroderma Sister     Breast cancer Sister 68    Hypertension Sister     Cancer Sister     No Known Problems Son     No Known Problems Son     Testicular cancer Son 41    Thyroid cancer Son 38    Colon cancer Maternal Aunt     Colon cancer Maternal Aunt     Breast cancer Other 50        kaylee's daughter    Alcohol abuse Neg Hx     Substance Abuse Neg Hx     Mental illness Neg Hx     Depression Neg Hx               SIGNATURE: MD Aamir Toussaint MD  Internal Medicine Residency, PGY-2  [facility]

## 2024-02-19 NOTE — PROGRESS NOTES
"[facility]  Interval Progress Note: Critical Care  Name: Natividad Ortiz I  MRN: [id number]  Unit/Bed#: [facility] 415-01 I Date of Admission: 2/19/2024   Date of Service: 2/19/2024 I Hospital Day: 0    Interval Events:  Since arrival from the OR, patient has persistently high chest tube output, now >1,000mL total in 2.5 hours. CT surgery aware. Amicar infusing. Coag studies sent. Vasopressor requirements relatively stable since arrival from the OR.        Pertinent New Data:   /51   Pulse 65   Temp 98.2 °F (36.8 °C) (Core)   Resp 12   Ht 5' 4\" (1.626 m)   Wt 103 kg (227 lb 11.2 oz)   SpO2 100%   BMI 39.08 kg/m²      Arterial Line  [place] /47  Arterial Line BP  Min: 91/50  Max: 125/54   MAP (!) 62 mmHg  Arterial Line MAP (mmHg)  Min: 61 mmHg  Max: 77 mmHg     PA Catheter   Most Recent  Min/Max in 24hrs    PAP 51/15 PAP  Min: 40/21  Max: 57/18   CVP 16 mmHg CVP (mean)  Min: 14 mmHg  Max: 20 mmHg   CI 1.9 L/min/m2  CI (L/min/m2)  Min: 1.7 L/min/m2  Max: 2.2 L/min/m2    (dyne*sec)/cm5 SVR (dyne*sec)/cm5  Min: 869 (dyne*sec)/cm5  Max: 1050 (dyne*sec)/cm5          CBC:   Lab Results   Component Value Date    WBC 27.04 (H) 02/19/2024    HGB 9.1 (L) 02/19/2024    HCT 28.5 (L) 02/19/2024    MCV 92 02/19/2024     02/19/2024    RBC 3.09 (L) 02/19/2024    MCH 29.4 02/19/2024    MCHC 31.9 02/19/2024    RDW 15.1 02/19/2024    MPV 10.5 02/19/2024   , CMP:   Lab Results   Component Value Date    SODIUM 146 02/19/2024    K 3.6 02/19/2024     (H) 02/19/2024    CO2 28 02/19/2024    CO2 27 02/19/2024    BUN 31 (H) 02/19/2024    CREATININE 1.16 02/19/2024    GLUCOSE 171 (H) 02/19/2024    CALCIUM 8.1 (L) 02/19/2024    EGFR 44 02/19/2024   , PT/INR:   Lab Results   Component Value Date    INR 1.72 (H) 02/19/2024     chest xrayI have personally reviewed pertinent reports.   and I have personally reviewed pertinent films in PACS      Assessment and Plan    Acute blood " loss anemia  Trend chest tube output Q1 hour  Repeat CBC, transfuse for hemoglobin <7 or hemodynamically indicated   Goal MAP >65, SBP <110    Coagulopathy in the setting of xarelto use   Initial coags: INR 2.6, plts 142, PT 28, PTT 43, fibrinogen 147  Given 4u FFP, 3cryo, 1 plt (previously received 1 platelet in the OR)  Repeat coags and continue to transfuse as necessary   TEG pending   Maintain normothermia    Billing Level:  During this visit, Critical care services were medically necessary as this patient had a high probability of imminent or life-threatening deterioration due to acute blood loss anemia, required my direct attention, intervention, and personal management to implement the following: transfusion of blood products  and bedside management.    I have personally provided 30 minutes on 02/19/24 of critical care time, exclusive of procedures, teaching, family meetings, and any prior time recorded by providers other than myself.    SIGNATURE: RICKY Arzate

## 2024-02-19 NOTE — ANESTHESIA PROCEDURE NOTES
Procedure Performed: HEATH Anesthesia  Start Time:  2/19/2024 8:15 AM        Preanesthesia Checklist    Patient identified, IV assessed, risks and benefits discussed, monitors and equipment assessed, procedure being performed at surgeon's request and anesthesia consent obtained.      Procedure    Diagnostic Indications for HEATH:  assessment of ascending aorta, assessment of surgical repair, defect repair evaluation and hemodynamic monitoring. Type of HEATH: complete HEATH with interpretation. Images Saved: ultrasound permanent image saved. Physician Requesting Echo: Liang Mccullough DO.  Location performed: OR. Intubated. Bite block not placed.   Heart visualized. Insertion of HEATH Probe:  Atraumatic. Probe Type:  Epiaortic and multiplane. Modalities:  3D, continuous wave Doppler, color flow mapping and pulse wave Doppler.      Echocardiographic and Doppler Measurements    PREPROCEDURE    LEFT VENTRICLE:  Systolic Function: normal. Ejection Fraction: 60%.        RIGHT VENTRICLE:  Systolic Function: mildly impaired.  Cavity size moderately dilated.              AORTIC VALVE:  Leaflets: trileaflet. Leaflet motions restricted. Stenosis: severe. Mean Gradient: 18 (cw off axis) mmHg. Peak Gradient: 29 mmHg. Maximum velocity (cm/s): 2.7 m/sec; LVOT max velocity 0.6 m/sec -DVI 0.22 DVI.  Regurgitation: trace.      MITRAL VALVE:  Leaflets: calcified. Leaflet Motions: mildly restricted. Regurgitation: moderate and mild-mod, central.    Mean Gradient: 2 mmHg.      TRICUSPID VALVE:  Leaflets: dilate annulus. Leaflet Motions: septal leaflet may be restrictee in closure, by RV lead.  Regurgitation: severe and central, due to malcoaptation.      PULMONIC VALVE:   Regurgitation: mild.         ASCENDING AORTA:    Dissection not present.      AORTIC ARCH:    dissection not present. Grade 3: atheroma protruding < 0.5 cm into lumen.    DESCENDING AORTA:    Dissection not present. Grade 3: atheroma protruding < 0.5 cm into  lumen.        RIGHT ATRIUM:  Size:  dilated.     LEFT ATRIUM:  Size: dilated.         OTHER ATRIAL FINDINGS:  No ALIZE clot; low flow at os, ALIZE hypo-contractile  Wirs seen in RA, RV  RA massively dilated.    ATRIAL SEPTUM:  Intra-atrial septal morphology: no PFO by CFD.              EPIAORTIC:  Plaque Thickness: 0-5 mm.        POSTPROCEDURE    LEFT VENTRICLE: Unchanged .         RIGHT VENTRICLE: Unchanged .           AORTIC VALVE:   Leaflets: bioprosthetic valve in aortic position, well-seated, doesnot rock, good leaflet excurion and closure, no PVL, at worst trace central leak seen in DTG view and bioprosthetic.  Mean Gradient: 9 mmHg.       MITRAL VALVE:    Regurgitation: mild.      TRICUSPID VALVE: Unchanged .                    AORTA: Unchanged .  Dissection: Dissection not present.      REMOVAL:  Probe Removal: atraumatic.

## 2024-02-20 DIAGNOSIS — G25.81 RLS (RESTLESS LEGS SYNDROME): ICD-10-CM

## 2024-02-20 RX ORDER — PRAMIPEXOLE DIHYDROCHLORIDE 0.5 MG/1
TABLET ORAL
Qty: 360 TABLET | Refills: 1 | Status: SHIPPED | OUTPATIENT
Start: 2024-02-20

## 2024-02-20 NOTE — RESPIRATORY THERAPY NOTE
Resp Vent Note    02/20/24 0705   Respiratory Assessment   Assessment Type Assess only   General Appearance Sedated   Respiratory Pattern Assisted   Chest Assessment Chest expansion symmetrical   Bilateral Breath Sounds Diminished   Resp Comments Recieved pt on SCMV vent settings, no vent changes made at this time, will cont to maddy per resp prot.   O2 Device G5 vent   Vent Information   Vent ID [id number]   Vent type Fair G5   Fair Vent Mode (S)CMV   $ Pulse Oximetry Spot Check Charge Completed   (S)CMV Settings   Resp Rate (BPM) 14 BPM   VT (mL) 450 mL   FIO2 (%) 60 %   PEEP (cmH2O) 8 cmH2O   I:E Ratio 1:3.3   Insp Time (%)   (1)   Flow Trigger (LPM) 3   Humidification Heater   Heater Temperature (Set) 98.6 °F (37 °C)   (S)CMV Actuals   Resp Rate (BPM) 14 BPM   VT (mL) 477   MV 6.7   MAP (cmH2O) 14 cmH2O   Peak Pressure (cmH2O) 28 cmH2O   I:E Ratio (Obs) 1:3.3   Insp Resistance 17   Heater Temperature (Obs) 98.6 °F (37 °C)   Static Compliance (mL/cmH20) 34.8 mL/cmH2O   Plateau Pressure (cm H2O) 22 cm H2O   (S)CMV Alarms   High Peak Pressure (cmH2O) 40   Low Pressure (cmH2O) 5 cm H2O   High Resp Rate (BPM) 40 BPM   Low Resp Rate (BPM) 8 BPM   High MV (L/min) 20 L/min   Low MV (L/min) 2 L/min   High VT (mL) 1000 mL   Low VT (mL) 200 mL   Apnea Time (s) 20 S   Maintenance   Alarm (pink) cable attached No   Resuscitation bag with peep valve at bedside Yes   Water bag changed No   Circuit changed No   Daily Screen   Patient safety screen outcome: Failed   Not Ready for Weaning due to: Underline problem not resolved   IHI Ventilator Associated Pneumonia Bundle   Daily Assessment of Readiness to Extubate Yes   Head of Bed Elevated HOB 30   ETT  Hi-Lo;Cuffed;Oral 7.5 mm   Placement Date/Time: 02/19/24 0750   Mask Ventilation: (c) Ventilated by mask with oral airway (2)  Preoxygenated: Yes  Technique: Direct laryngoscopy  Type: Hi-Lo;Cuffed;Oral  Tube Size: 7.5 mm  Laryngoscope: Mac  Blade Size: 3  Location: Oral   Grade...   Secured at (cm) 23   Measured from Lips   Secured Location Left   Repositioned Center to Left   Secured by Commercial tube macedo   Site Condition Dry   Cuff Pressure (color) Green   HI-LO Suction  Intermittent suction   HI-LO Secretions Scant;Clear   HI-LO Intervention Patent

## 2024-02-20 NOTE — PHYSICAL THERAPY NOTE
Physical Therapy Cancellation Note             02/20/24 0915   PT Last Visit   PT Visit Date 02/20/24   Note Type   Note type Cancelled Session   Cancel Reasons Intubated/sedated     Dimitry Bocanegra

## 2024-02-20 NOTE — RESPIRATORY THERAPY NOTE
02/19/24 1912   Respiratory Assessment   Assessment Type Assess only   General Appearance Sedated   Respiratory Pattern Spontaneous;Assisted   Chest Assessment Chest expansion symmetrical   Bilateral Breath Sounds Diminished;Clear   Cough None   Resp Comments Patient transitioned to spontaneous ventilation.   O2 Device G5   Vent Information   Vent ID [id number]   Vent type Fair G5   Fair Vent Mode SPONT   $ Pulse Oximetry Spot Check Charge Completed   SPONT Settings   FIO2 (%) 40 %   PEEP (cmH2O) 6 cmH2O   Pressure Support (cmH2O) 12 cmH20   Flow Trigger (LPM) 3 LPM   P-ramp (ms) 50 ms   ETS  (%) 25 %   Humidification Heater   Heater Temp 95 °F (35 °C)   SPONT Actuals   Resp Rate (BPM) 20 BPM   VT (mL) 328 mL   MV (Obs) 6.7   MAP (cmH2O) 8.9 cmH2O   Peak Pressure (cmH2O) 19 cmH2O   I:E Ratio (Obs) 1:3.4   RSBI (f/vt) 61 f/Vt   Heater Temperature (Obs) 95 °F (35 °C)   SPONT Alarms   High Peak Pressure (cmH20) 40 cmH2O   High Resp Rate (BPM) 40 BPM   High MV (L/min) 20 L/min   Low MV (L/min) 2 L/min   High Spont VTE (mL) 1000 mL   Low Spont VTE (mL) 200 mL   Apnea Time (S) 20 S   SPONT Apnea Settings   Resp Rate (BPM) 15 BPM   FIO2 (%) 40 %   %TI (%)   (1.3 sec)   Apnea Time (S) 20 S   Maintenance   Alarm (pink) cable attached No   Resuscitation bag with peep valve at bedside Yes   Water bag changed No   Circuit changed No   Daily Screen   Patient safety screen outcome: Passed   Spont breathing trial % for 30 min Yes   IHI Ventilator Associated Pneumonia Bundle   Daily Awakening Trials Performed Yes   Daily Assessment of Readiness to Extubate Yes   Head of Bed Elevated HOB 30   ETT  Hi-Lo;Cuffed;Oral 7.5 mm   Placement Date/Time: 02/19/24 0750   Mask Ventilation: (c) Ventilated by mask with oral airway (2)  Preoxygenated: Yes  Technique: Direct laryngoscopy  Type: Hi-Lo;Cuffed;Oral  Tube Size: 7.5 mm  Laryngoscope: Mac  Blade Size: 3  Location: Oral  Grade...   Secured at (cm) 22   Measured from Teeth    Secured Location Center   Secured by Commercial tube macedo   Cuff Pressure (color)   (MLT)   HI-LO Suction  Intermittent suction   HI-LO Secretions Scant   HI-LO Intervention Patent

## 2024-02-20 NOTE — PROGRESS NOTES
[facility]  Progress Note: Critical Care   Date of Service: 2/20/2024  Hospital Day: 1  Name: Natividad Ortiz I  MRN: [id number]  Unit/Bed#: [facility] 415-01      Assessment/Plan     Impressions:  Severe AS s/p Aortic Valve Replacement with pericardial tissue valve  ABLA  Acute respiratory failure  PAF on chronic anticoagulation  SSS s/p PPM  Chronic diastolic CHF  GERD  Carrero's Esophagus  CKD3  Multiple Lung Nodules  Venous Insufficiency  L Breast Ca s/p Lumpectomy/radiation    Neuro:   Currently on no sedative medications  Cardiac Surgery Pain Control: ATC tylenol and PRN oxycodone 2.5/5mg  Delirium precautions  Regulate sleep/wake cycle  CAM-ICU daily  Trend neuro exam      CV:   Cardiac Surgery Hemodynamic Monitoring: MAP goal >65 CI >2.2 Continue pulmonary artery catheter for hemodynamic monitoring  Continue central line due to vasoactive medications Continue arterial line for blood pressure monitoring and/or frequent blood gas draws  Follow rhythm on telemetry  Critical Care Infusions : Levophed 10 mcg/min, Vasopressin 0.08 Units/min, Epinephrine 2 mcg/min, and Primacor 0.13 mcg/kg/min  Hold Milrinone this AM   Beta Blocker Plan: Hold beta blocker secondary to hypotension  Epicardial pacing wire plan : Epicardial pacing wires in place. Will pace as needed for bradycardia or cardiac output   Post op cardiac surgery medications:    mg QD  Statin: Lipitor 80 mg qHS  Amiodarone 200 mg PO q8 hours  Ectopy/atrial fibrillation: Amiodarone drip  AC plan?    Lung:   Acute respiratory failure requiring 14/4508/60%. Secondary to poor inspiratory effort secondary to pain, pulmonary vascular congestion, and lethargy/altered mental status  Chest tube output remains persistently high; Continue chest tubes to suction today    GI:   Continue PPI for stress ulcer prophylaxis  Continue bowel regimen  Trend abdominal exam and bowel function    FEN:   Diuresis Plan: PRN  Nutrition  plan: as tolerated  Replenish electrolytes with goals: K >4.0, Mag >2.0, and Phos >3.0    :   Ledesma Plan: Continue for accurate I/O monitoring for 48 hours  Trend UOP and BUN/creat  Strict I and O     ID:   Trend temps and WBC count  Maintain normothermia    Heme:   Trend hgb and plts    Endo:   SSI      MSK/Skin:  Mobility goal:   PT/OT consult as medically appropriate   Frequent turning and pressure off-loading  Local wound care as needed    Disposition:  Critical care    ICU Core Measures     Vented Patient  VAP Bundle  VAP bundle ordered     A: Assess, Prevent, and Manage Pain Has pain been assessed? Yes  Need for changes to pain regimen? No   B: Both Spontaneous Awakening Trials (SATs) and Spontaneous Breathing Trials (SBTs) Plan to perform spontaneous awakening trial today? Yes   Plan to perform spontaneous breathing trial today? Yes   Obvious barriers to extubation? Yes   C: Choice of Sedation RASS Goal: 0 Alert and Calm  Need for changes to sedation or analgesia regimen? No   D: Delirium CAM-ICU: Negative   E: Early Mobility  Plan for early mobility? Yes   F: Family Engagement Plan for family engagement today? Yes       Antibiotic Review: Post op requirements     Review of Invasive Devices:    Ledesma Plan: Continue for accurate I/O monitoring for 48 hours  Central access plan: Patient has multiple central venous catheters.   [place] Plan: Keep arterial line for hemodynamic monitoring and frequent ABGs    Prophylaxis:  VTE VTE covered by:  fondaparinux, Subcutaneous       Stress Ulcer  covered byfamotidine (PEPCID) 40 MG tablet [[id number]] (Long-Term Med), pantoprazole (PROTONIX) 20 mg tablet [[id number]] (Long-Term Med), pantoprazole (PROTONIX) EC tablet 40 mg [[id number]]          Significant 24hr Events      24 Hour Events/HPI: POD # 1 s/p Tissue AVR.  Postoperative received 500 of LR and 500 albumin secondary to hypotension.  Received 4 units FFP, 2 units of cryo, 1 unit platelets, 1 unit PRBC, Amicar,  protamine, and factor VII due to coagulopathic bleeding.  Received amnio bolus x 2 and amnio drip at 1 for ectopy and atrial fibrillation with improvements normal sinus this morning.  Milrinone weaned to 0.13 @ 3am with adequate CI remains intubated with episodes of Hypoxia requiring Peep 8, FiO2 60%  Critical Care Infusions : Levophed 10 mcg/min, Vasopressin 0.08 Units/min, Epinephrine 2 mcg/min, and Primacor 0.13 mcg/kg/min   Subjective   Review of Systems   Unable to perform ROS: Intubated        Objective     Medications:  Scheduled Continuous   acetaminophen, 650 mg, Q6H While awake  amiodarone 150 mg in dextrose 5 % 100 mL IV bolus, 150 mg, Once  amiodarone, 200 mg, Q8H ANTON  ascorbic acid, 500 mg, BID  aspirin, 325 mg, Daily  atorvastatin, 80 mg, Daily With Dinner  cefazolin, 2,000 mg, Q8H  chlorhexidine, 15 mL, BID  fondaparinux, 2.5 mg, Daily  gabapentin, 800 mg, TID  mupirocin, 1 Application, Q12H ANTON  pantoprazole, 40 mg, Early Morning  polyethylene glycol, 17 g, Daily  pramipexole, 1 mg, HS      amiodarone (CORDARONE) 900 mg in dextrose 5 % 500 mL infusion, 1 mg/min, Last Rate: 1 mg/min (02/20/24 0322)  epinephrine, 1-10 mcg/min, Last Rate: 2 mcg/min (02/19/24 9835)  insulin regular (HumuLIN R,NovoLIN R) 1 Units/mL in sodium chloride 0.9 % 100 mL infusion, 0.3-21 Units/hr, Last Rate: 4 Units/hr (02/20/24 0334)  milrinone (Primacor) infusion, 0.13 mcg/kg/min, Last Rate: 0.13 mcg/kg/min (02/20/24 0312)  niCARdipine, 2.5-15 mg/hr  norepinephrine, 1-30 mcg/min, Last Rate: 20 mcg/min (02/20/24 9263)  phenylephine,  mcg/min, Last Rate: Stopped (02/19/24 1536)  sodium chloride, 20 mL/hr, Last Rate: 20 mL/hr (02/19/24 1136)  vasopressin, 0.08 Units/min, Last Rate: 0.08 Units/min (02/20/24 0378)         Vitals: Invasive Monitoring       Most Recent Min/Max in 24hrs   Temp (!) 100.6 °F (38.1 °C) Temp  Min: 96.3 °F (35.7 °C)  Max: 100.6 °F (38.1 °C)   Pulse 82 Pulse  Min: 60  Max: 124   Resp 19 Resp  Min: 12   Max: 52   /54 BP  Min: 95/48  Max: 125/58   O2 Sat 91 % SpO2  Min: 91 %  Max: 100 %   O2 Device: Ventilator  Nasal Cannula O2 Flow Rate (L/min): 2 L/min Arterial Line  [place] /46  Arterial Line BP  Min: 91/47  Max: 125/54   MAP 65 mmHg  Arterial Line MAP (mmHg)  Min: 57 mmHg  Max: 77 mmHg     PA Catheter   Most Recent  Min/Max in 24hrs    PAP 55/17 PAP  Min: 40/21  Max: 57/20   CVP 12 mmHg CVP (mean)  Min: 11 mmHg  Max: 20 mmHg   CI 3.1 L/min/m2  CI (L/min/m2)  Min: 1.7 L/min/m2  Max: 3.1 L/min/m2    (dyne*sec)/cm5 SVR (dyne*sec)/cm5  Min: 723 (dyne*sec)/cm5  Max: 1050 (dyne*sec)/cm5        I/O Chest tube output (if present)     Intake/Output Summary (Last 24 hours) at 2/20/2024 0539  Last data filed at 2/20/2024 0400  Gross per 24 hour   Intake 24864.01 ml   Output 4340 ml   Net 6759.01 ml     UOP: 40/hour    BM: No BM in the last 24 hours  Weight change:      Mediastinal tubes:  55 mL/8hr  1895 mL/24hr             Physical Exam   Physical Exam  Eyes:      Extraocular Movements: Extraocular movements intact.      Pupils: Pupils are equal, round, and reactive to light.   Skin:     General: Skin is warm.   HENT:      Head: Normocephalic.      Right Ear: Tympanic membrane normal.      Mouth/Throat:      Mouth: Mucous membranes are moist.      Comments: Endotracheal tube in place  Neck:      Vascular: Central line present.   Cardiovascular:      Rate and Rhythm: Normal rate and regular rhythm.      Pulses: Normal pulses.   Musculoskeletal:      Right lower leg: No edema.      Left lower leg: No edema.   Abdominal:      Palpations: Abdomen is soft.   Constitutional:       Appearance: She is well-developed and well-nourished.   Pulmonary:      Effort: No respiratory distress.   Neurological:      General: No focal deficit present.      Mental Status: She is alert and oriented to person, place and time. Mental status is at baseline.      Motor: Strength full and intact in all extremities.    Genitourinary/Anorectal:  Ledesma present.        Diagnostic Studies      02/20/24 CXR: Lines and tubes in place, no pneumothorax, right lower lobe effusion  This was personally reviewed by myself in PACS.  02/20/24 EKG: Normal sinus rhythm, T wave inversion V4, V5.   This was personally reviewed by myself.         Labs:  CBC    Recent Labs     02/19/24  1652 02/19/24 2057 02/20/24  0333   WBC 26.72*  --  21.35*   HGB 9.3* 9.5* 8.8*   HCT 30.0*  --  29.0*   *  --  153     BMP    Recent Labs     02/19/24  1652 02/20/24  0126   SODIUM 147 144   K 3.8 4.4   * 110*   CO2 26 25   AGAP 9 9   BUN 30* 30*   CREATININE 1.15 1.38*   CALCIUM 8.8 8.5        Baseline Creat: 1.1   Coags    Recent Labs     02/19/24  1322 02/19/24  1652   INR 1.72* 0.96   PTT 34 30              Additional Electrolytes  Recent Labs     02/19/24  1042 02/19/24  1652 02/19/24 2057 02/20/24  0126 02/20/24  0333   MG  --    < >  --  2.6 2.6   CAIONIZED 1.24  --  1.17  --   --     < > = values in this interval not displayed.          Blood Gas    Recent Labs     02/20/24  0333   PHART 7.252*   [id number] 50.9*   PO2ART 66.8*   [id number] 21.9*   BEART -5.3   SOURCE Line, Arterial     Recent Labs     02/20/24  0333   SOURCE Line, Arterial    LFTs  No recent results    Infectious    No recent results     No recent results Glucose  Recent Labs     02/19/24  1131 02/19/24  1652 02/20/24  0126   GLUC 168* 148* 169*          Collaborative bedside rounds performed with cardiac surgery attending, critical care attending and bedside RN.     Wan Lowry PA-C

## 2024-02-20 NOTE — RESPIRATORY THERAPY NOTE
02/19/24 2059   Respiratory Assessment   Resp Comments Patient placed back on (S)CMV per provider request. FiO2 increased to 50%.   O2 Device G5   Vent Information   Vent ID [id number]   Vent type Fair G5   Fair Vent Mode (S)CMV   $ Pulse Oximetry Spot Check Charge Completed   SpO2 91 %   (S)CMV Settings   Resp Rate (BPM) 12 BPM   VT (mL) 450 mL   FIO2 (%) 50 %   PEEP (cmH2O) 6 cmH2O   I:E Ratio 1:4   Insp Time (%)   (1 sec)   Flow Trigger (LPM) 3   Humidification Heat and moisture exchanger   (S)CMV Actuals   Resp Rate (BPM) 15 BPM   VT (mL) 479   MV 7   MAP (cmH2O) 10 cmH2O   Peak Pressure (cmH2O) 26 cmH2O   I:E Ratio (Obs) 1:3.3   Insp Resistance 14   Static Compliance (mL/cmH20) 29.3 mL/cmH2O   (S)CMV Alarms   High Peak Pressure (cmH2O) 40   Low Pressure (cmH2O) 5 cm H2O   High Resp Rate (BPM) 40 BPM   Low Resp Rate (BPM) 8 BPM   High MV (L/min) 20 L/min   Low MV (L/min) 2 L/min   High VT (mL) 1000 mL   Low VT (mL) 200 mL   Apnea Time (s) 20 S   Daily Screen   Patient safety screen outcome: Failed   Not Ready for Weaning due to: Underline problem not resolved   ETT  Hi-Lo;Cuffed;Oral 7.5 mm   Placement Date/Time: 02/19/24 0750   Mask Ventilation: (c) Ventilated by mask with oral airway (2)  Preoxygenated: Yes  Technique: Direct laryngoscopy  Type: Hi-Lo;Cuffed;Oral  Tube Size: 7.5 mm  Laryngoscope: Mac  Blade Size: 3  Location: Oral  Grade...   Secured at (cm) 23   Measured from Lips   Secured Location Center   Secured by Commercial tube macedo   Cuff Pressure (cm H2O)   (MLT)   HI-LO Suction  Intermittent suction   HI-LO Secretions Scant;Clear   HI-LO Intervention Patent

## 2024-02-20 NOTE — PROGRESS NOTES
Progress Note - Cardiothoracic Surgery   Natividad Ortiz 83 y.o. female MRN: [id number]  Unit/Bed#: [facility] 415-01 Encounter: [id number]      POD # 1 s/p Tissue AVR    Pt seen/examined.  Interval history and data reviewed with critical care team.  Resolved yesterday after correction of coagulopathy.  No further bleeding night.  He is maintaining normal cardiac output and index overnight        Medications:   Scheduled Meds:  Current Facility-Administered Medications   Medication Dose Route Frequency Provider Last Rate    acetaminophen  650 mg Rectal Q4H PRN Yari Peñaloza PA-C      acetaminophen  650 mg Oral Q6H While awake Yari Peñaloza PA-C      amiodarone (CORDARONE) 900 mg in dextrose 5 % 500 mL infusion  1 mg/min Intravenous Continuous Bernice Mclain PA-C 1 mg/min (02/20/24 0322)    amiodarone 150 mg in dextrose 5 % 100 mL IV bolus  150 mg Intravenous Once Bernice Mclain PA-C      amiodarone  200 mg Oral Q8H ANTON Yari Peñaloza PA-C      ascorbic acid  500 mg Oral BID Yari Peñaloza PA-C      aspirin  325 mg Oral Daily Yari Peñaloza PA-C      atorvastatin  80 mg Oral Daily With Dinner Yari Peñaloza PA-C      bisacodyl  10 mg Rectal Daily PRN Yari Peñaloza PA-C      cefazolin  2,000 mg Intravenous Q8H Yari Peñaloza PA-C 2,000 mg (02/20/24 0131)    chlorhexidine  15 mL Mouth/Throat BID Yari Peñaloza PA-C      epinephrine  1-10 mcg/min Intravenous Titrated Gege Donnelly PA-C 2 mcg/min (02/19/24 1745)    fentanyl citrate (PF)  50 mcg Intravenous Q1H PRN Yari Peñaloza PA-C      fondaparinux  2.5 mg Subcutaneous Daily Yari Peñaloza PA-C      gabapentin  800 mg Oral TID Yari Peñaloza PA-C      HYDROmorphone  0.5 mg Intravenous Q2H PRN Yari Peñaloza PA-C      insulin regular (HumuLIN R,NovoLIN R) 1 Units/mL in sodium chloride 0.9 % 100 mL infusion  0.3-21 Units/hr Intravenous Titrated Yari Peñaloza PA-C 3 Units/hr (02/20/24 0740)     milrinone (Primacor) infusion  0.13 mcg/kg/min Intravenous Continuous Bernice Mclain PA-C 0.13 mcg/kg/min (02/20/24 0312)    mupirocin  1 Application Nasal Q12H ANTON Yari Peñaloza PA-C      niCARdipine  2.5-15 mg/hr Intravenous Titrated Yari Peñaloza PA-C      norepinephrine  1-30 mcg/min Intravenous Titrated Katherine Jimenez CRNP 10 mcg/min (02/20/24 0750)    ondansetron  4 mg Intravenous Q6H PRN Yari Peñaloza PA-C      oxyCODONE  2.5 mg Oral Q4H PRN Yari Peñaloza PA-C      Or    oxyCODONE  5 mg Oral Q4H PRN Yari Peñaloza PA-C      pantoprazole  40 mg Oral Early Morning Yari Peñaloza PA-C      phenylephine   mcg/min Intravenous Titrated Gege Donnelly PA-C Stopped (02/19/24 1536)    polyethylene glycol  17 g Oral Daily Yari Peñaloza PA-C      potassium chloride  40 mEq Intravenous Q1H PRN Yari Peñaloza PA-C      pramipexole  1 mg Oral HS Yari Peñaloza PA-C      sodium chloride  20 mL/hr Intravenous Continuous Yari Peñaloza PA-C 20 mL/hr (02/19/24 1136)    vasopressin  0.08 Units/min Intravenous Continuous Bernice Mclain PA-C 0.08 Units/min (02/20/24 0602)     Continuous Infusions:amiodarone (CORDARONE) 900 mg in dextrose 5 % 500 mL infusion, 1 mg/min, Last Rate: 1 mg/min (02/20/24 0322)  epinephrine, 1-10 mcg/min, Last Rate: 2 mcg/min (02/19/24 3175)  insulin regular (HumuLIN R,NovoLIN R) 1 Units/mL in sodium chloride 0.9 % 100 mL infusion, 0.3-21 Units/hr, Last Rate: 3 Units/hr (02/20/24 0740)  milrinone (Primacor) infusion, 0.13 mcg/kg/min, Last Rate: 0.13 mcg/kg/min (02/20/24 0312)  niCARdipine, 2.5-15 mg/hr  norepinephrine, 1-30 mcg/min, Last Rate: 10 mcg/min (02/20/24 8960)  phenylephine,  mcg/min, Last Rate: Stopped (02/19/24 1536)  sodium chloride, 20 mL/hr, Last Rate: 20 mL/hr (02/19/24 1136)  vasopressin, 0.08 Units/min, Last Rate: 0.08 Units/min (02/20/24 0602)      PRN Meds:.  acetaminophen    bisacodyl    fentanyl  "citrate (PF)    HYDROmorphone    ondansetron    oxyCODONE **OR** oxyCODONE    potassium chloride    Vitals: Blood pressure 108/54, pulse 78, temperature (!) 100.6 °F (38.1 °C), temperature source Core, resp. rate 16, height 5' 4\" (1.626 m), weight 109 kg (240 lb 1.3 oz), SpO2 92%.,Body mass index is 41.21 kg/m².  I/O last 24 hours:  In: 83139 [I.V.:5467.5; Blood:2832.5; NG/GT:160; IV Piggyback:2080]  Out: 4550 [Urine:1395; Blood:1250; Chest Tube:1905]  Invasive Devices       Central Venous Catheter Line  Duration             CVC Central Lines 02/19/24 <1 day    Introducer 02/19/24 <1 day              Peripheral Intravenous Line  Duration             Peripheral IV 02/19/24 Left Antecubital 1 day              Arterial Line  Duration             Arterial Line 02/19/24 Right Radial 1 day              Line  Duration             Pacer Wires <1 day    Pacer Wires <1 day              Drain  Duration             Urethral Catheter Non-latex;Temperature probe 16 Fr. 1 day    Chest Tube 1 Other (Comment) Mediastinal;Posterior 32 Fr. <1 day    Chest Tube 2 Other (Comment) Mediastinal;Anterior 32 Fr. <1 day    NG/OG/Enteral Tube Orogastric Left mouth <1 day              Airway  Duration             ETT  Hi-Lo;Cuffed;Oral 7.5 mm 1 day                      Lab, Imaging and other studies:   Results from last 7 days   Lab Units 02/20/24  0333 02/19/24  2057 02/19/24  1652 02/19/24  1322   WBC Thousand/uL 21.35*  --  26.72* 27.04*   HEMOGLOBIN g/dL 8.8* 9.5* 9.3* 9.1*   HEMATOCRIT % 29.0*  --  30.0* 28.5*   PLATELETS Thousands/uL 153  --  148* 150     Results from last 7 days   Lab Units 02/20/24  0126 02/19/24  1652 02/19/24  1551 02/19/24  1133 02/19/24  1131   POTASSIUM mmol/L 4.4 3.8 3.8  --  3.6   CHLORIDE mmol/L 110* 112*  --   --  112*   CO2 mmol/L 25 26  --   --  28   CO2, I-STAT mmol/L  --   --   --  30  --    BUN mg/dL 30* 30*  --   --  31*   CREATININE mg/dL 1.38* 1.15  --   --  1.16   GLUCOSE, ISTAT mg/dl  --   --   --  " 169*  --    CALCIUM mg/dL 8.5 8.8  --   --  8.1*     Results from last 7 days   Lab Units 02/19/24  1652 02/19/24  1322 02/19/24  1233   INR  0.96 1.72* 2.67*   PTT seconds 30 34 43*     Recent Labs     02/20/24  0333   PHART 7.252*   [id number] 21.9*   PO2ART 66.8*   [id number] 50.9*   BEART -5.3           Plan:  Collaborative rounds performed at bedside with critical care team.  I agree with their assessment and plan.  They will need for ventilator and extubate as tolerated.  Hemodynamically she is improving, and will wean pressors as able.  Will begin diuretic therapy for negative fluid balance.      SIGNATURE: Liang Mccullough DO  DATE: February 20, 2024  TIME: 8:06 AM

## 2024-02-20 NOTE — RESPIRATORY THERAPY NOTE
02/20/24 0501   Respiratory Assessment   Resp Comments Rate increased to 14, FiO2 increased to 60%.   O2 Device G5   Vent Information   Vent type Fair G5   Fair Vent Mode (S)CMV   $ Pulse Oximetry Spot Check Charge Completed   (S)CMV Settings   Resp Rate (BPM) 14 BPM   VT (mL) 450 mL   FIO2 (%) 60 %   PEEP (cmH2O) 8 cmH2O   I:E Ratio 1:3.3

## 2024-02-20 NOTE — PLAN OF CARE
Problem: Prexisting or High Potential for Compromised Skin Integrity  Goal: Skin integrity is maintained or improved  Description: INTERVENTIONS:  - Identify patients at risk for skin breakdown  - Assess and monitor skin integrity  - Assess and monitor nutrition and hydration status  - Monitor labs   - Assess for incontinence   - Turn and reposition patient  - Assist with mobility/ambulation  - Relieve pressure over bony prominences  - Avoid friction and shearing  - Provide appropriate hygiene as needed including keeping skin clean and dry  - Evaluate need for skin moisturizer/barrier cream  - Collaborate with interdisciplinary team   - Patient/family teaching  - Consider wound care consult   Outcome: Progressing     Problem: SAFETY,RESTRAINT: NV/NON-SELF DESTRUCTIVE BEHAVIOR  Goal: Remains free of harm/injury (restraint for non violent/non self-detsructive behavior)  Description: INTERVENTIONS:  - Instruct patient/family regarding restraint use   - Assess and monitor physiologic and psychological status   - Provide interventions and comfort measures to meet assessed patient needs   - Identify and implement measures to help patient regain control  - Assess readiness for release of restraint   Outcome: Progressing  Goal: Returns to optimal restraint-free functioning  Description: INTERVENTIONS:  - Assess the patient's behavior and symptoms that indicate continued need for restraint  - Identify and implement measures to help patient regain control  - Assess readiness for release of restraint   Outcome: Progressing

## 2024-02-20 NOTE — OCCUPATIONAL THERAPY NOTE
OT CANCEL NOTE:    Orders for OT evaluation received. Pt is currently intubated/sedated, unable to participate in OT. Will continue to follow and complete evaluation as appropriate.

## 2024-02-21 ENCOUNTER — TELEPHONE (OUTPATIENT)
Age: 84
End: 2024-02-21

## 2024-02-21 NOTE — TELEPHONE ENCOUNTER
Spoke with Bam, scheduled TCM will follow up with patient after discharge  She is being discharged sat 02/24

## 2024-02-21 NOTE — RESPIRATORY THERAPY NOTE
02/20/24 2045   Respiratory Assessment   Assessment Type Assess only   General Appearance Alert;Awake   Respiratory Pattern Assisted   Chest Assessment Chest expansion symmetrical   Bilateral Breath Sounds Diminished;Clear   Cough None   Resp Comments Patient transitioned to (S)CMV for the remainder of the night.   O2 Device G5   Vent Information   Vent ID [id number]   Vent type Fair G5   Fair Vent Mode (S)CMV   $ Pulse Oximetry Spot Check Charge Completed   (S)CMV Settings   Resp Rate (BPM) 14 BPM   VT (mL) 450 mL   FIO2 (%) 50 %   PEEP (cmH2O) 8 cmH2O   I:E Ratio 1:3.3   Insp Time (%)   (1 sec)   Flow Trigger (LPM) 3   Humidification Heater   Heater Temperature (Set) 98.6 °F (37 °C)   (S)CMV Actuals   Resp Rate (BPM) 18 BPM   VT (mL) 456   MV 8.4   MAP (cmH2O) 12 cmH2O   Peak Pressure (cmH2O) 27 cmH2O   I:E Ratio (Obs) 1:2.8   Insp Resistance 12   Heater Temperature (Obs) 98.6 °F (37 °C)   Static Compliance (mL/cmH20) 26.2 mL/cmH2O   (S)CMV Alarms   High Peak Pressure (cmH2O) 40   Low Pressure (cmH2O) 5 cm H2O   High Resp Rate (BPM) 40 BPM   Low Resp Rate (BPM) 8 BPM   High MV (L/min) 20 L/min   Low MV (L/min) 4 L/min   High VT (mL) 1000 mL   Low VT (mL) 200 mL   Apnea Time (s) 20 S   Maintenance   Alarm (pink) cable attached No   Resuscitation bag with peep valve at bedside Yes   Water bag changed No   Circuit changed No   IHI Ventilator Associated Pneumonia Bundle   Daily Assessment of Readiness to Extubate Yes   Head of Bed Elevated HOB 30   ETT  Hi-Lo;Cuffed;Oral 7.5 mm   Placement Date/Time: 02/19/24 0750   Mask Ventilation: (c) Ventilated by mask with oral airway (2)  Preoxygenated: Yes  Technique: Direct laryngoscopy  Type: Hi-Lo;Cuffed;Oral  Tube Size: 7.5 mm  Laryngoscope: Mac  Blade Size: 3  Location: Oral  Grade...   Secured at (cm) 23   Measured from Lips   Secured Location Right   Repositioned Left to Right   Secured by Commercial tube macedo   Cuff Pressure (cm H2O)   (MLT)   HI-LO Suction   Intermittent suction   HI-LO Secretions Scant   HI-LO Intervention Patent

## 2024-02-21 NOTE — PHYSICAL THERAPY NOTE
Physical Therapy Cancellation Note         02/21/24 0905   Note Type   Note type Cancelled Session   Cancel Reasons Intubated/sedated     Dimitry Bocanegra

## 2024-02-21 NOTE — PLAN OF CARE
Problem: Prexisting or High Potential for Compromised Skin Integrity  Goal: Skin integrity is maintained or improved  Description: INTERVENTIONS:  - Identify patients at risk for skin breakdown  - Assess and monitor skin integrity  - Assess and monitor nutrition and hydration status  - Monitor labs   - Assess for incontinence   - Turn and reposition patient  - Assist with mobility/ambulation  - Relieve pressure over bony prominences  - Avoid friction and shearing  - Provide appropriate hygiene as needed including keeping skin clean and dry  - Evaluate need for skin moisturizer/barrier cream  - Collaborate with interdisciplinary team   - Patient/family teaching  - Consider wound care consult   Outcome: Progressing     Problem: SAFETY,RESTRAINT: NV/NON-SELF DESTRUCTIVE BEHAVIOR  Goal: Remains free of harm/injury (restraint for non violent/non self-detsructive behavior)  Description: INTERVENTIONS:  - Instruct patient/family regarding restraint use   - Assess and monitor physiologic and psychological status   - Provide interventions and comfort measures to meet assessed patient needs   - Identify and implement measures to help patient regain control  - Assess readiness for release of restraint   Outcome: Progressing  Goal: Returns to optimal restraint-free functioning  Description: INTERVENTIONS:  - Assess the patient's behavior and symptoms that indicate continued need for restraint  - Identify and implement measures to help patient regain control  - Assess readiness for release of restraint   Outcome: Progressing     Problem: CARDIOVASCULAR - ADULT  Goal: Maintains optimal cardiac output and hemodynamic stability  Description: INTERVENTIONS:  - Monitor I/O, vital signs and rhythm  - Monitor for S/S and trends of decreased cardiac output  - Administer and titrate ordered vasoactive medications to optimize hemodynamic stability  - Assess quality of pulses, skin color and temperature  - Assess for signs of decreased  coronary artery perfusion  - Instruct patient to report change in severity of symptoms  Outcome: Progressing  Goal: Absence of cardiac dysrhythmias or at baseline rhythm  Description: INTERVENTIONS:  - Continuous cardiac monitoring, vital signs, obtain 12 lead EKG if ordered  - Administer antiarrhythmic and heart rate control medications as ordered  - Monitor electrolytes and administer replacement therapy as ordered  Outcome: Progressing     Problem: RESPIRATORY - ADULT  Goal: Achieves optimal ventilation and oxygenation  Description: INTERVENTIONS:  - Assess for changes in respiratory status  - Assess for changes in mentation and behavior  - Position to facilitate oxygenation and minimize respiratory effort  - Oxygen administered by appropriate delivery if ordered  - Initiate smoking cessation education as indicated  - Encourage broncho-pulmonary hygiene including cough, deep breathe, Incentive Spirometry  - Assess the need for suctioning and aspirate as needed  - Assess and instruct to report SOB or any respiratory difficulty  - Respiratory Therapy support as indicated  Outcome: Progressing     Problem: METABOLIC, FLUID AND ELECTROLYTES - ADULT  Goal: Electrolytes maintained within normal limits  Description: INTERVENTIONS:  - Monitor labs and assess patient for signs and symptoms of electrolyte imbalances  - Administer electrolyte replacement as ordered  - Monitor response to electrolyte replacements, including repeat lab results as appropriate  - Instruct patient on fluid and nutrition as appropriate  Outcome: Progressing  Goal: Fluid balance maintained  Description: INTERVENTIONS:  - Monitor labs   - Monitor I/O and WT  - Instruct patient on fluid and nutrition as appropriate  - Assess for signs & symptoms of volume excess or deficit  Outcome: Progressing  Goal: Glucose maintained within target range  Description: INTERVENTIONS:  - Monitor Blood Glucose as ordered  - Assess for signs and symptoms of  hyperglycemia and hypoglycemia  - Administer ordered medications to maintain glucose within target range  - Assess nutritional intake and initiate nutrition service referral as needed  Outcome: Progressing

## 2024-02-21 NOTE — OCCUPATIONAL THERAPY NOTE
Occupational Therapy Cancel Note        Patient Name: Natividad Ortiz  Today's Date: 2/21/2024 02/21/24 0805   OT Last Visit   OT Visit Date 02/21/24   Note Type   Note type Cancelled Session   Cancel Reasons Intubated/sedated   Additional Comments OT consult received, chart reviewed. Pt intubated on this date, not medically appropiate for therapy. Will continue to follow for OT eval as pt is medically stable.       Dee Russell, MIGUEL, OTR/L

## 2024-02-21 NOTE — PLAN OF CARE
Problem: CARDIOVASCULAR - ADULT  Goal: Maintains optimal cardiac output and hemodynamic stability  Description: INTERVENTIONS:  - Monitor I/O, vital signs and rhythm  - Monitor for S/S and trends of decreased cardiac output  - Administer and titrate ordered vasoactive medications to optimize hemodynamic stability  - Assess quality of pulses, skin color and temperature  - Assess for signs of decreased coronary artery perfusion  - Instruct patient to report change in severity of symptoms  Outcome: Progressing  Goal: Absence of cardiac dysrhythmias or at baseline rhythm  Description: INTERVENTIONS:  - Continuous cardiac monitoring, vital signs, obtain 12 lead EKG if ordered  - Administer antiarrhythmic and heart rate control medications as ordered  - Monitor electrolytes and administer replacement therapy as ordered  Outcome: Progressing     Problem: RESPIRATORY - ADULT  Goal: Achieves optimal ventilation and oxygenation  Description: INTERVENTIONS:  - Assess for changes in respiratory status  - Assess for changes in mentation and behavior  - Position to facilitate oxygenation and minimize respiratory effort  - Oxygen administered by appropriate delivery if ordered  - Initiate smoking cessation education as indicated  - Encourage broncho-pulmonary hygiene including cough, deep breathe, Incentive Spirometry  - Assess the need for suctioning and aspirate as needed  - Assess and instruct to report SOB or any respiratory difficulty  - Respiratory Therapy support as indicated  Outcome: Progressing     Problem: METABOLIC, FLUID AND ELECTROLYTES - ADULT  Goal: Electrolytes maintained within normal limits  Description: INTERVENTIONS:  - Monitor labs and assess patient for signs and symptoms of electrolyte imbalances  - Administer electrolyte replacement as ordered  - Monitor response to electrolyte replacements, including repeat lab results as appropriate  - Instruct patient on fluid and nutrition as appropriate  Outcome:  Progressing  Goal: Fluid balance maintained  Description: INTERVENTIONS:  - Monitor labs   - Monitor I/O and WT  - Instruct patient on fluid and nutrition as appropriate  - Assess for signs & symptoms of volume excess or deficit  Outcome: Progressing  Goal: Glucose maintained within target range  Description: INTERVENTIONS:  - Monitor Blood Glucose as ordered  - Assess for signs and symptoms of hyperglycemia and hypoglycemia  - Administer ordered medications to maintain glucose within target range  - Assess nutritional intake and initiate nutrition service referral as needed  Outcome: Progressing

## 2024-02-21 NOTE — TELEPHONE ENCOUNTER
Bam from [facility] cardiac surgery called to schedule a TCM for the patient.  Please call Bam at [phone] to schedule TCM.

## 2024-02-21 NOTE — RESPIRATORY THERAPY NOTE
Resp vent note   02/21/24 0716   Respiratory Assessment   Assessment Type Assess only   General Appearance Sleeping   Respiratory Pattern Assisted   Chest Assessment Chest expansion symmetrical   Bilateral Breath Sounds Clear;Diminished   Resp Comments Pt placed on PS 8/6 50%, tolerating well at this time. Will cont to monitor pt per resp prot.   O2 Device G5   Vent Information   Vent ID [id number]   Vent type Fair G5   Fair Vent Mode SPONT   $ Pulse Oximetry Spot Check Charge Completed   SPONT Settings   FIO2 (%) 50 %   PEEP (cmH2O) 6 cmH2O   Pressure Support (cmH2O) 8 cmH20   Flow Trigger (LPM) 3 LPM   P-ramp (ms) 50 ms   ETS  (%) 25 %   Humidification Heater   Heater Temp 98.6 °F (37 °C)   SPONT Actuals   Resp Rate (BPM) 20 BPM   VT (mL) 314 mL   MV (Obs) 6.3   MAP (cmH2O) 8.4 cmH2O   Peak Pressure (cmH2O) 15 cmH2O   I:E Ratio (Obs) 1:3.2   RSBI (f/vt) 62 f/Vt   Heater Temperature (Obs) 98.6 °F (37 °C)   SPONT Alarms   High Peak Pressure (cmH20) 40 cmH2O   High Resp Rate (BPM) 40 BPM   High MV (L/min) 20 L/min   Low MV (L/min) 2 L/min   High Spont VTE (mL) 1000 mL   Low Spont VTE (mL) 200 mL   Apnea Time (S) 30 S   SPONT Apnea Settings   Resp Rate (BPM) 15 BPM  (PC15)   FIO2 (%) 50 %   %TI (%) 1.3 %   Apnea Time (S) 30 S   Maintenance   Alarm (pink) cable attached No   Resuscitation bag with peep valve at bedside Yes   Water bag changed Yes   Circuit changed No   Daily Screen   Patient safety screen outcome: Passed   Not Ready for Weaning due to: Underline problem not resolved   IHI Ventilator Associated Pneumonia Bundle   Daily Assessment of Readiness to Extubate Yes   Head of Bed Elevated HOB 30   ETT  Hi-Lo;Cuffed;Oral 7.5 mm   Placement Date/Time: 02/19/24 0750   Mask Ventilation: (c) Ventilated by mask with oral airway (2)  Preoxygenated: Yes  Technique: Direct laryngoscopy  Type: Hi-Lo;Cuffed;Oral  Tube Size: 7.5 mm  Laryngoscope: Mac  Blade Size: 3  Location: Oral  Grade...   Secured at (cm) 23    Measured from Lips   Secured Location Right   Secured by Commercial tube macedo   Site Condition Dry   Cuff Pressure (color) Green   HI-LO Suction  Intermittent suction   HI-LO Secretions Scant   HI-LO Intervention Patent

## 2024-02-21 NOTE — CASE MANAGEMENT
Case Management Assessment & Discharge Planning Note    Patient name Natividad Ortiz  Location [facility]-01 MRN [id number]  : 1940 Date 2024       Current Admission Date: 2024  Current Admission Diagnosis:S/P AVR   Patient Active Problem List    Diagnosis Date Noted    S/P AVR 2024    Chronic venous hypertension (idiopathic) with ulcer of right lower extremity (CODE) ([facility]) 2024    Cellulitis of right leg 2023    Non-healing wound of left lower extremity 2023    Aortic stenosis, severe 2023    Chronic left shoulder pain 2023    Chronic diastolic CHF (congestive heart failure) ([facility]) 2022    Coagulopathy ([facility]) 2022    Status post total knee replacement using cement, right 2022    Stage 3a chronic kidney disease ([facility]) 10/06/2021    Exotropia of right eye 2021    Seasonal allergies 2021    Leg swelling 2020    Status post total knee replacement using cement, left 2020    Lichen sclerosus et atrophicus of the vulva 2020    Colon polyps 2019    Chronic edema 2019    Deep venous insufficiency 2019    Pacemaker     GERD (gastroesophageal reflux disease)     Mild cognitive impairment 2018    Vitamin D deficiency 2018    Carrero's esophagus with dysplasia 2018    Sensory polyneuropathy 2018    RLS (restless legs syndrome) 2018    Acquired deformity of foot 2018    Corns 2018    Diabetic polyneuropathy associated with type 2 diabetes mellitus (HCC) 2018    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[facility]) 2018    Radiculopathy of lumbar region 2018    Atrial fibrillation ([facility]) [I48.91] 2018    Dense breast tissue on mammogram 2017    Difficulty walking 2017    Flat foot 2017    Onychomycosis 2017    Hiatal hernia     Multiple lung nodules 2015    Severe obesity (BMI 35.0-39.9) with comorbidity ([facility]) 2014     Osteopenia of multiple sites 04/16/2014    Arthritis 03/11/2014    Essential hypertension 03/11/2014    Lichen sclerosus et atrophicus 05/08/2013    Peripheral neuropathy 05/08/2013      LOS (days): 2  Geometric Mean LOS (GMLOS) (days): 8.7  Days to GMLOS:6.3     OBJECTIVE:    Risk of Unplanned Readmission Score: 42.27         Current admission status: Inpatient       Preferred Pharmacy:   [facility] [id number] [place], [place] - [address] [place] [address]  [address] [facility] [address]  Phone: [phone] Fax: [phone]    [facility] - [place], [place] - [address]  [address]  [place] [address]  Phone: [phone] Fax: [phone]    Primary Care Provider: Sandy Gupta MD    Primary Insurance: MEDICARE  Secondary Insurance: COMMERCIAL MISCELLANEOUS    ASSESSMENT:  Active Health Care Proxies       Angel Vicente Health Care Agent - Son   Primary Phone: [phone] (Mobile)                 Advance Directives  Does patient have a Health Care POA?: Yes  Does patient have Advance Directives?: Yes  Advance Directives: Living will, Power of  for health care  Primary Contact: Mauricio Ortiz         Readmission Root Cause  30 Day Readmission: No    Patient Information  Admitted from:: Home  Mental Status: Intubated, Sedated  During Assessment patient was accompanied by: Not accompanied during assessment  Assessment information provided by:: Other - please comment (records)  Primary Caregiver: Self  Support Systems: Spouse/significant other, Son, Other (Comment) (2 sons)  County of Residence: [place]  What city do you live in?: [facility]  Home entry access options. Select all that apply.: Stairs  Number of steps to enter home.: 3  Do the steps have railings?: Yes  Type of Current Residence: [address] [place] [address] home  Upon entering residence, is there a bedroom on the main floor (no further steps)?: No  A bedroom is located on the following floor levels  of residence (select all that apply):: 2nd Floor  Upon entering residence, is there a bathroom on the main floor (no further steps)?: Yes  Number of steps to 2nd floor from main floor: One Flight  Living Arrangements: Lives w/ Spouse/significant other  Is patient a ?: No    Activities of Daily Living Prior to Admission  Functional Status: Independent  Completes ADLs independently?: Yes  Ambulates independently?: Yes  Does patient use assisted devices?: Yes  Assisted Devices (DME) used: Straight Cane, Walker, Bedside Commode, Shower Chair  Does patient currently own DME?: Yes  What DME does the patient currently own?: Straight Cane, Bedside Commode, Shower Chair, Walker  Does patient have a history of Outpatient Therapy (PT/OT)?: Yes  Does the patient have a history of Short-Term Rehab?: No  Does patient have a history of HHC?: Yes ([facility])  Does patient currently have HHC?: No         Patient Information Continued  Income Source: Pension/custodial  Does patient have prescription coverage?: Yes  Does patient receive dialysis treatments?: No  Does patient have a history of substance abuse?: No  Does patient have a history of Mental Health Diagnosis?: No         Means of Transportation  Means of Transport to Appts:: Drives Self      Social Determinants of Health (SDOH)      Flowsheet Row Most Recent Value   Housing Stability    In the last 12 months, was there a time when you were not able to pay the mortgage or rent on time? N   In the last 12 months, how many places have you lived? 1   In the last 12 months, was there a time when you did not have a steady place to sleep or slept in a shelter (including now)? N   Transportation Needs    In the past 12 months, has lack of transportation kept you from medical appointments or from getting medications? no   In the past 12 months, has lack of transportation kept you from meetings, work, or from getting things needed for daily living? No   Food Insecurity     Within the past 12 months, you worried that your food would run out before you got the money to buy more. Never true   Within the past 12 months, the food you bought just didn't last and you didn't have money to get more. Never true   Utilities    In the past 12 months has the electric, gas, oil, or water company threatened to shut off services in your home? No            DISCHARGE DETAILS:    Discharge planning discussed with:: pt in critical care still intubated, will discuss with pt and family when appropriate  Freedom of Choice: Yes                                                                                         Additional Comments: 84yo female is POD#2 AVR also with afib RVR. Remains in critical care on ventilator, FiO2 50%. Pt. with giancarlo Case, 2 chest tubes, TG noble, on 4 gtts. Starting to wean from ventilator. No family here at this time, will speak with them when available. Son Vicente is spokesperson. Pt. has hx of [place] VNA but may need STR at discharge. Hx CHF, CKD3, PPM. Lives with  in [facility].

## 2024-02-21 NOTE — PROCEDURES
Procedure: Epicardial Wire Removal    02/21/24    Patient was returned to bed. EPW x 1 d/c'd in typical fashion.  No immediate complications. Site dressed with Drain sponge and ABD pad. Patient and nurse aware of mandatory 1 hour bedrest protocol. Vital signs ordered Q 15 minutes for one hour as per protocol.    Wan Lowry PA-C

## 2024-02-21 NOTE — PROGRESS NOTES
Progress Note - Cardiothoracic Surgery   Natividad Ortiz 83 y.o. female MRN: [id number]  Unit/Bed#: [facility] 415-01 Encounter: [id number]      POD # 2 s/p Tissue AVR    Pt seen/examined.  Interval history and data reviewed with critical care team.  Weaning vent. Tolerating pressure support trials.         Medications:   Scheduled Meds:  Current Facility-Administered Medications   Medication Dose Route Frequency Provider Last Rate    acetaminophen  650 mg Rectal Q4H PRN Yari Peñaloza PA-C      acetaminophen  650 mg Oral Q6H While awake Yari Peñaloza PA-C      amiodarone (CORDARONE) 900 mg in dextrose 5 % 500 mL infusion  1 mg/min Intravenous Continuous Bernice Mclain PA-C 1 mg/min (02/21/24 0411)    amiodarone 150 mg in dextrose 5 % 100 mL IV bolus  150 mg Intravenous Once Bernice Mclain PA-C      amiodarone  200 mg Oral Q8H [facility] Yari Peñaloza PA-C      ascorbic acid  500 mg Oral BID Yari Peñaloza PA-C      aspirin  325 mg Oral Daily Yari Peñaloza PA-C      atorvastatin  80 mg Oral Daily With Dinner aYri Peñaloza PA-C      bisacodyl  10 mg Rectal Daily PRN Yari Peñaloza PA-C      chlorhexidine  15 mL Mouth/Throat BID Yari Peñaloza PA-C      fentanyl citrate (PF)  50 mcg Intravenous Q1H PRN Yari Peñaloza PA-C      furosemide  40 mg/hr Intravenous Continuous Bernice Mclain PA-C 40 mg/hr (02/21/24 0553)    gabapentin  800 mg Oral TID Yari Peñaloza PA-C      heparin (porcine)  5,000 Units Subcutaneous Q8H [facility] Bernice Mclain PA-C      HYDROmorphone  0.5 mg Intravenous Q2H PRN Yari Peñaloza PA-C      insulin lispro  1-6 Units Subcutaneous Q6H [facility] Wan Lowry PA-C      milrinone (Primacor) infusion  0.13 mcg/kg/min Intravenous Continuous Bernice Mclain PA-C 0.13 mcg/kg/min (02/20/24 2043)    mupirocin  1 Application Nasal Q12H [facility] Yari Peñaloza PA-C      norepinephrine  1-30 mcg/min Intravenous Titrated Wan Lowry PA-C Stopped  "(02/21/24 0808)    ondansetron  4 mg Intravenous Q6H PRN Yari Peñaloza PA-C      oxyCODONE  2.5 mg Oral Q4H PRN Yari Peñaloza PA-C      Or    oxyCODONE  5 mg Oral Q4H PRN Yari Peñaloza PA-C      pantoprazole  40 mg Oral Early Morning Yari Peñaloza PA-C      polyethylene glycol  17 g Oral Daily Yari Peñaloza PA-C      pramipexole  1 mg Oral HS Yari Peñaloza PA-C      warfarin  2.5 mg Oral Once (warfarin) Wan Lowry PA-C       Continuous Infusions:amiodarone (CORDARONE) 900 mg in dextrose 5 % 500 mL infusion, 1 mg/min, Last Rate: 1 mg/min (02/21/24 0411)  furosemide, 40 mg/hr, Last Rate: 40 mg/hr (02/21/24 0553)  milrinone (Primacor) infusion, 0.13 mcg/kg/min, Last Rate: 0.13 mcg/kg/min (02/20/24 2043)  norepinephrine, 1-30 mcg/min, Last Rate: Stopped (02/21/24 0808)      PRN Meds:.  acetaminophen    bisacodyl    fentanyl citrate (PF)    HYDROmorphone    ondansetron    oxyCODONE **OR** oxyCODONE    Vitals: Blood pressure 113/55, pulse 99, temperature 99.3 °F (37.4 °C), temperature source Core, resp. rate 16, height 5' 4\" (1.626 m), weight 111 kg (245 lb 2.4 oz), SpO2 91%.,Body mass index is 42.08 kg/m².  I/O last 24 hours:  In: 2962 [I.V.:2281; NG/GT:470; IV Piggyback:100; Feedings:111]  Out: 2430 [Urine:2080; Emesis/NG output:250; Chest Tube:100]  Invasive Devices       Central Venous Catheter Line  Duration             CVC Central Lines 02/19/24 2 days    Introducer 02/19/24 2 days              Peripheral Intravenous Line  Duration             Peripheral IV 02/19/24 Left Antecubital 2 days              Arterial Line  Duration             Arterial Line 02/19/24 Right Radial 2 days              Drain  Duration             Urethral Catheter Non-latex;Temperature probe 16 Fr. 2 days    Chest Tube 1 Other (Comment) Mediastinal;Posterior 32 Fr. 1 day    Chest Tube 2 Other (Comment) Mediastinal;Anterior 32 Fr. 1 day    NG/OG/Enteral Tube Orogastric Left mouth 1 day              " Airway  Duration             ETT  Hi-Lo;Cuffed;Oral 7.5 mm 2 days                      Lab, Imaging and other studies:   Results from last 7 days   Lab Units 02/21/24  0410 02/20/24  0939 02/20/24  0333   WBC Thousand/uL 16.76* 19.94* 21.35*   HEMOGLOBIN g/dL 7.3* 8.6* 8.8*   HEMATOCRIT % 23.1* 27.2* 29.0*   PLATELETS Thousands/uL 101* 131* 153     Results from last 7 days   Lab Units 02/21/24  0410 02/20/24  2121 02/20/24  0939 02/19/24  1551 02/19/24  1133   POTASSIUM mmol/L 4.5 4.6 4.6   < >  --    CHLORIDE mmol/L 107 109* 110*   < >  --    CO2 mmol/L 25 25 25   < >  --    CO2, I-STAT mmol/L  --   --   --   --  30   BUN mg/dL 38* 34* 33*   < >  --    CREATININE mg/dL 1.86* 1.87* 1.46*   < >  --    GLUCOSE, ISTAT mg/dl  --   --   --   --  169*   CALCIUM mg/dL 8.3* 8.3* 8.3*   < >  --     < > = values in this interval not displayed.     Results from last 7 days   Lab Units 02/19/24  1652 02/19/24  1322 02/19/24  1233   INR  0.96 1.72* 2.67*   PTT seconds 30 34 43*     Recent Labs     02/20/24  0333   PHART 7.252*   [id number] 21.9*   PO2ART 66.8*   [id number] 50.9*   BEART -5.3           Plan:  Collaborative rounds performed at bedside with critical care team.  I agree with their assessment and plan.    Wean pressors as able  Goal to extubate today  Keep tubes one more day (no further bleeding yesterday)  Diuresis for goal negative (even last 24 hours)      SIGNATURE: Davian Arenas MD  DATE: February 21, 2024  TIME: 9:13 AM

## 2024-02-21 NOTE — PROGRESS NOTES
[facility]  Progress Note: Critical Care   Date of Service: 2/21/2024  Hospital Day: 2  Name: Natividad Ortiz I  MRN: [id number]  Unit/Bed#: [facility] 415-01      Assessment/Plan     Impressions:  Severe AS s/p Aortic Valve Replacement with pericardial Tissue Valve  ABLA  Acute respiratory failure  PAF on chronic anticoagulation  Atrial fibrillation with rapid ventricular response  Sick sinus syndrome s/p PPM  Chronic diastolic CHF  GERD  Carrero's esophagus  CKD 3  Lung nodules  Venous insufficiency  Left breast CA s/p lumpectomy/radiation    Neuro:   Cardiac Surgery Pain Control: ATC tylenol and PRN oxycodone 2.5/5mg  Delirium precautions  Regulate sleep/wake cycle  CAM-ICU daily  Trend neuro exam      CV:   Cardiac Surgery Hemodynamic Monitoring: MAP goal >65 CI >2.2 Continue pulmonary artery catheter for hemodynamic monitoring  Continue central line due to vasoactive medications Continue arterial line for blood pressure monitoring and/or frequent blood gas draws  Follow rhythm on telemetry  Critical Care Infusions : Levophed 2 mcg/min, Vasopressin 0.04 Units/min, and Primacor 0.13 mcg/kg/min  Beta Blocker Plan: Hold beta blocker secondary to Hypotension   Epicardial pacing wire plan : No longer needed, discontinue  Post op cardiac surgery medications:    mg QD  Statin: Lipitor 80 mg qHS  Amiodarone 200 mg PO q8 hours  A Fib RVR  Amiodarone Drip @1  AC Plan?    Lung:   Acute respiratory failure requiring 14/450/8/50%. Secondary to poor inspiratory effort secondary to pain, pulmonary vascular congestion, and pleural effusion  Chest tube output remains persistently high; Continue chest tubes to suction today    GI:   Continue PPI for stress ulcer prophylaxis  Continue bowel regimen  Trend abdominal exam and bowel function    FEN:   Diuresis Plan: Lasix infusion 40 mg/hr.  Check Q6 BMPs.   Nutrition plan: as tolerated  Replenish electrolytes with goals: K >4.0, Mag >2.0,  and Phos >3.0    :   Ledesma Plan: Continue for accurate I/O monitoring for 48 hours  Trend UOP and BUN/creat  Strict I and O     ID:   Trend temps and WBC count  Maintain normothermia    Heme:   Trend hgb and plts    Endo:   SSI      MSK/Skin:  Mobility goal:   PT/OT consult as medically appropriate   Frequent turning and pressure off-loading  Local wound care as needed    Disposition:  Critical care    ICU Core Measures     Vented Patient  VAP Bundle  VAP bundle ordered     A: Assess, Prevent, and Manage Pain Has pain been assessed? Yes  Need for changes to pain regimen? No   B: Both Spontaneous Awakening Trials (SATs) and Spontaneous Breathing Trials (SBTs) Plan to perform spontaneous awakening trial today? Yes   Plan to perform spontaneous breathing trial today? Yes   Obvious barriers to extubation? No   C: Choice of Sedation RASS Goal: 0 Alert and Calm  Need for changes to sedation or analgesia regimen? No   D: Delirium CAM-ICU: Negative   E: Early Mobility  Plan for early mobility? Yes   F: Family Engagement Plan for family engagement today? Yes       Review of Invasive Devices:    Ledesma Plan: Continue for accurate I/O monitoring for 48 hours  Central access plan: Patient has multiple central venous catheters.   Sunita Plan: Keep arterial line for hemodynamic monitoring and frequent labs    Prophylaxis:  VTE VTE covered by:  heparin (porcine), Subcutaneous, 5,000 Units at 02/21/24 0536       Stress Ulcer  covered byfamotidine (PEPCID) 40 MG tablet [[id number]] (Long-Term Med), pantoprazole (PROTONIX) 20 mg tablet [[id number]] (Long-Term Med), pantoprazole (PROTONIX) EC tablet 40 mg [[id number]]          Significant 24hr Events      24 Hour Events/HPI: POD # 2 s/p tissue AVR.  A-fib with RVR yesterday morning requiring amnio bolus x 2, dig to 50 x 2, esmolol bolus with improvement.  Tolerated spontaneous 8/8 yesterday.  Received 40 of Lasix yesterday morning followed by 80 of Lasix x 2 yesterday afternoon, followed  by 2 Bumex, followed by Lasix infusion and 10 of metolazone.  Continues to tolerate vasopressor wean. Critical Care Infusions : Levophed 2 mcg/min, Vasopressin 0.04 Units/min, and Primacor 0.13 mcg/kg/min   Subjective   Review of Systems   Unable to perform ROS: Intubated        Objective     Medications:  Scheduled Continuous   acetaminophen, 650 mg, Q6H While awake  amiodarone 150 mg in dextrose 5 % 100 mL IV bolus, 150 mg, Once  amiodarone, 200 mg, Q8H [facility]  ascorbic acid, 500 mg, BID  aspirin, 325 mg, Daily  atorvastatin, 80 mg, Daily With Dinner  chlorhexidine, 15 mL, BID  gabapentin, 800 mg, TID  heparin (porcine), 5,000 Units, Q8H [facility]  insulin lispro, 1-6 Units, Q6H [facility]  mupirocin, 1 Application, Q12H [facility]  pantoprazole, 40 mg, Early Morning  polyethylene glycol, 17 g, Daily  pramipexole, 1 mg, HS      amiodarone (CORDARONE) 900 mg in dextrose 5 % 500 mL infusion, 1 mg/min, Last Rate: 1 mg/min (02/21/24 0411)  dexmedetomidine, 0.1-0.7 mcg/kg/hr, Last Rate: Stopped (02/20/24 2308)  epinephrine, 1-10 mcg/min, Last Rate: Stopped (02/20/24 1634)  furosemide, 40 mg/hr, Last Rate: 40 mg/hr (02/21/24 0553)  milrinone (Primacor) infusion, 0.13 mcg/kg/min, Last Rate: 0.13 mcg/kg/min (02/20/24 2043)  norepinephrine, 1-30 mcg/min, Last Rate: 2 mcg/min (02/21/24 0601)  vasopressin, 0.04 Units/min, Last Rate: 0.04 Units/min (02/20/24 2231)         Vitals: Invasive Monitoring       Most Recent Min/Max in 24hrs   Temp 99.1 °F (37.3 °C) Temp  Min: 99.1 °F (37.3 °C)  Max: 100.6 °F (38.1 °C)   Pulse 94 Pulse  Min: 75  Max: 169   Resp 16 Resp  Min: 14  Max: 79   /53 BP  Min: 78/47  Max: 148/64   O2 Sat 95 % SpO2  Min: 90 %  Max: 97 %   O2 Device: Ventilator  Nasal Cannula O2 Flow Rate (L/min): 2 L/min Arterial Line  Sunita /52  Arterial Line BP  Min: 69/40  Max: 115/61   MAP 66 mmHg  Arterial Line MAP (mmHg)  Min: 48 mmHg  Max: 79 mmHg     PA Catheter   Most Recent  Min/Max in 24hrs    PAP 54/24 PAP  Min: 39/21  Max:  62/26   CVP 20 mmHg CVP (mean)  Min: 8 mmHg  Max: 21 mmHg   CI 2.5 L/min/m2  CI (L/min/m2)  Min: 1.7 L/min/m2  Max: 3.3 L/min/m2    (dyne*sec)/cm5 SVR (dyne*sec)/cm5  Min: 548 (dyne*sec)/cm5  Max: 1050 (dyne*sec)/cm5        I/O Chest tube output (if present)     Intake/Output Summary (Last 24 hours) at 2024 0644  Last data filed at 2024 0600  Gross per 24 hour   Intake 2703.13 ml   Output 2130 ml   Net 573.13 ml     UOP: 70/hour    BM: No BM in the last 24 hours  Weight change: 2.3 kg (5 lb 1.1 oz)     Mediastinal tubes:  60 mL/8hr  100 mL/24hr             Physical Exam   Physical Exam  Vitals and nursing note reviewed.   Eyes:      Pupils: Pupils are equal, round, and reactive to light.   Skin:     General: Skin is warm.   HENT:      Head: Normocephalic.      Nose: No congestion.      Mouth/Throat:      Mouth: Mucous membranes are moist.      Comments: ETT in place  Neck:      Vascular: Central line present.   Cardiovascular:      Rate and Rhythm: Normal rate and regular rhythm.   Musculoskeletal:         General: Normal range of motion.      Right lower le+ Edema present.      Left lower le+ Edema present.   Abdominal:      Palpations: Abdomen is soft.   Constitutional:       Appearance: She is ill-appearing.   Pulmonary:      Effort: Pulmonary effort is normal.      Comments: Coarse breath sounds bilaterally  Neurological:      Mental Status: She is alert. Mental status is at baseline.      Motor: gross motor function is at baseline for patient. Strength full and intact in all extremities.   Genitourinary/Anorectal:  Ledesma present.        Diagnostic Studies      24 EKG: A. Fib.   This was personally reviewed by myself.         Labs:  CBC    Recent Labs     24  0939 24  0410   WBC 19.94* 16.76*   HGB 8.6* 7.3*   HCT 27.2* 23.1*   * 101*     BMP    Recent Labs     24  2121 24  0410   SODIUM 141 138   K 4.6 4.5   * 107   CO2 25 25   AGAP 7 6   BUN  34* 38*   CREATININE 1.87* 1.86*   CALCIUM 8.3* 8.3*        Baseline Creat: 1.1   Coags    Recent Labs     02/19/24  1322 02/19/24  1652   INR 1.72* 0.96   PTT 34 30              Additional Electrolytes  Recent Labs     02/20/24  0939 02/20/24 2121 02/21/24  0410   MG 2.6 2.3 2.3   PHOS  --   --  3.0   CAIONIZED 1.13  --  1.13          Blood Gas    Recent Labs     02/20/24  0333   PHART 7.252*   [id number] 50.9*   PO2ART 66.8*   [id number] 21.9*   BEART -5.3   SOURCE Line, Arterial     Recent Labs     02/20/24  0333   SOURCE Line, Arterial    LFTs  No recent results    Infectious    No recent results     No recent results Glucose  Recent Labs     02/20/24  0126 02/20/24  0939 02/20/24 2121 02/21/24  0410   GLUC 169* 201* 168* 178*          Collaborative bedside rounds performed with cardiac surgery attending, critical care attending and bedside RN.     Wan Lowry PA-C

## 2024-02-22 NOTE — PROCEDURES
Procedure: Chest Tube Removal    02/22/24    Mediastinal CT x 2 d/c'd in typical fashion. Patient tolerated procedure well. No immediate complications. Site dressed with Acticoat dressing. Patient's nurse was made aware of removal.

## 2024-02-22 NOTE — OCCUPATIONAL THERAPY NOTE
Occupational Therapy Cancel Note        Patient Name: Natividad Ortiz  Today's Date: 2/22/2024 02/22/24 0805   OT Last Visit   OT Visit Date 02/22/24   Note Type   Note type Cancelled Session   Cancel Reasons Intubated/sedated   Additional Comments Chart reviewed. Pt remains intubated on this date, will continue to follow for OT evaluation as medically appropriate for therapy.       Dee Russell, MIGUEL, OTR/L

## 2024-02-22 NOTE — PROGRESS NOTES
[facility]  Progress Note: Critical Care   Date of Service: 2/22/2024  Hospital Day: 3  Name: Natividad Ortiz I  MRN: [id number]  Unit/Bed#: [facility] 415-01      Assessment/Plan     Impressions:  Severe AS s/p aortic valve replacement with pericardial tissue valve  ABLA  Acute respiratory insufficiency  Paroxysmal atrial fibrillation on chronic anticoagulation  Sick sinus syndrome s/p PPM  Chronic diastolic CHF  GERD  Carrero's esophagus  CKD 3  Lung nodules  Venous insufficiency  Left breast CA s/p lumpectomy/radiation    Neuro:   Cardiac Surgery Pain Control: ATC tylenol and PRN oxycodone 2.5/5mg  Delirium precautions  Regulate sleep/wake cycle  CAM-ICU daily  Trend neuro exam      CV:   Cardiac Surgery Hemodynamic Monitoring: MAP goal >65 CI >2.2 Continue pulmonary artery catheter for hemodynamic monitoring  Continue central line due to vasoactive medications Continue arterial line for blood pressure monitoring and/or frequent blood gas draws  Follow rhythm on telemetry  Critical Care Infusions : Primacor 0.13 mcg/kg/min  Beta Blocker Plan: Start Lopressor 12.5 mg BID  Epicardial pacing wire plan : Epicardial wires not in place PPM in place    Post op cardiac surgery medications:    mg QD  Statin: Lipitor 80 mg qHS  Amiodarone 200 mg PO q8 hours   Atrial Fibrillation  Continue Amio Drip For Now  Coumadin Dosing- Pending INR after 2.5 Coumadin    Lung:   Acute respiratory failure requiring 14/450/6/40. Secondary to poor inspiratory effort secondary to pain and pulmonary vascular congestion  Chest tube drainage diminished; D/C today    GI:   Continue PPI for stress ulcer prophylaxis  Continue bowel regimen  Trend abdominal exam and bowel function    FEN:   Diuresis Plan: Bumex infusion 2 mg/hr.  Check Q6 BMPs  Nutrition plan: as tolerated  Replenish electrolytes with goals: K >4.0, Mag >2.0, and Phos >3.0    :   Ledesma Plan: Continue for accurate I/O monitoring for 48  hours  Trend UOP and BUN/creat  Strict I and O     ID:   Trend temps and WBC count  Maintain normothermia    Heme:   Trend hgb and plts    Endo:   SSI    MSK/Skin:  Mobility goal:   PT/OT consult as medically appropriate   Frequent turning and pressure off-loading  Local wound care as needed    Disposition:  Critical care    ICU Core Measures     Vented Patient  VAP Bundle  VAP bundle ordered     A: Assess, Prevent, and Manage Pain Has pain been assessed? Yes  Need for changes to pain regimen? No   B: Both Spontaneous Awakening Trials (SATs) and Spontaneous Breathing Trials (SBTs) Plan to perform spontaneous awakening trial today? Yes   Plan to perform spontaneous breathing trial today? Yes   Obvious barriers to extubation? No   C: Choice of Sedation RASS Goal: 0 Alert and Calm  Need for changes to sedation or analgesia regimen? No   D: Delirium CAM-ICU: Negative   E: Early Mobility  Plan for early mobility? Yes   F: Family Engagement Plan for family engagement today? Yes       Review of Invasive Devices:    Baltazar Plan: Continue for accurate I/O monitoring for 48 hours  Central access plan: Patient has multiple central venous catheters.   Sunita Plan: Keep arterial line for hemodynamic monitoring    Prophylaxis:  VTE VTE covered by:  heparin (porcine), Subcutaneous, 5,000 Units at 02/22/24 0614       Stress Ulcer  covered byfamotidine (PEPCID) 40 MG tablet [[id number]] (Long-Term Med), pantoprazole (PROTONIX) 20 mg tablet [[id number]] (Long-Term Med), pantoprazole (PROTONIX) EC tablet 40 mg [[id number]]          Significant 24hr Events      24 Hour Events/HPI: POD # 3 s/p Tissue AVR.  Transition to Bumex infusion yesterday morning maintained at 2.  Received Diuril bolus.  Net -687 for 24 hours.  Tachypnea and hypoxia on SBT, left intubated.  Remains on milrinone at 0.13. no acute events overnight.  Critical Care Infusions : Primacor 0.13 mcg/kg/min   Subjective   Review of Systems   Unable to perform ROS: Intubated         Objective     Medications:  Scheduled Continuous   acetaminophen, 650 mg, Q6H While awake  amiodarone 150 mg in dextrose 5 % 100 mL IV bolus, 150 mg, Once  amiodarone, 200 mg, Q8H [facility]  ascorbic acid, 500 mg, BID  aspirin, 325 mg, Daily  atorvastatin, 80 mg, Daily With Dinner  calcium gluconate, 2 g, Once  chlorhexidine, 15 mL, BID  gabapentin, 800 mg, TID  heparin (porcine), 5,000 Units, Q8H [facility]  insulin lispro, 1-6 Units, Q6H [facility]  mupirocin, 1 Application, Q12H [facility]  pantoprazole, 40 mg, Early Morning  polyethylene glycol, 17 g, Daily  pramipexole, 1 mg, HS      amiodarone (CORDARONE) 900 mg in dextrose 5 % 500 mL infusion, 1 mg/min, Last Rate: 1 mg/min (02/21/24 1922)  bumetanide (BUMEX) 12.5 mg infusion 50 mL, 2 mg/hr, Last Rate: 2 mg/hr (02/22/24 0215)  milrinone (Primacor) infusion, 0.13 mcg/kg/min, Last Rate: 0.13 mcg/kg/min (02/21/24 1936)  norepinephrine, 1-30 mcg/min, Last Rate: Stopped (02/21/24 1935)         Vitals: Invasive Monitoring       Most Recent Min/Max in 24hrs   Temp 99.1 °F (37.3 °C) Temp  Min: 99.1 °F (37.3 °C)  Max: 100.2 °F (37.9 °C)   Pulse 101 Pulse  Min: 94  Max: 113   Resp 16 Resp  Min: 16  Max: 34   /60 BP  Min: 88/49  Max: 130/60   O2 Sat 95 % SpO2  Min: 91 %  Max: 97 %   O2 Device: Ventilator  Nasal Cannula O2 Flow Rate (L/min): 2 L/min Arterial Line  Sunita /55  Arterial Line BP  Min: 79/42  Max: 131/63   MAP 68 mmHg  Arterial Line MAP (mmHg)  Min: 51 mmHg  Max: 81 mmHg     PA Catheter   Most Recent  Min/Max in 24hrs    PAP 52/24 PAP  Min: 38/18  Max: 64/32   CVP 16 mmHg CVP (mean)  Min: 8 mmHg  Max: 206 mmHg   CI 2.6 L/min/m2  CI (L/min/m2)  Min: 1.7 L/min/m2  Max: 3.7 L/min/m2   SVR 1101 (dyne*sec)/cm5 SVR (dyne*sec)/cm5  Min: 548 (dyne*sec)/cm5  Max: 1101 (dyne*sec)/cm5        I/O Chest tube output (if present)     Intake/Output Summary (Last 24 hours) at 2/22/2024 0620  Last data filed at 2/22/2024 0400  Gross per 24 hour   Intake 1507.56 ml   Output 2305 ml   Net  -797.44 ml     UOP: 90/hour    BM: No BM in the last 24 hours  Weight change:      Mediastinal tubes:  50 mL/8hr  100 mL/24hr             Physical Exam   Physical Exam  Vitals and nursing note reviewed.   Eyes:      Extraocular Movements: Extraocular movements intact.      Pupils: Pupils are equal, round, and reactive to light.   Skin:     General: Skin is warm.   HENT:      Head: Normocephalic.      Nose: No congestion.      Mouth/Throat:      Mouth: Mucous membranes are moist.   Neck:      Vascular: Central line present.   Cardiovascular:      Rate and Rhythm: Normal rate. Rhythm irregular.      Pulses: Normal pulses.      Heart sounds: No murmur heard.     No friction rub. No gallop.   Musculoskeletal:      Right lower leg: Trace Edema present.      Left lower leg: Trace Edema present.   Abdominal:      Palpations: Abdomen is soft.   Constitutional:       Appearance: She is ill-appearing.      Interventions: She is sedated, intubated and restrained.   Pulmonary:      Effort: Pulmonary effort is normal. She is intubated.      Breath sounds: Normal breath sounds.   Neurological:      Comments: Awakes and follows commands in all 4 extremities.   Genitourinary/Anorectal:  Ledesma present.        Diagnostic Studies      02/22/24 EKG: Rate controlled A-fib.   This was personally reviewed by myself.         Labs:  CBC    Recent Labs     02/21/24  0410 02/22/24  0403   WBC 16.76* 17.65*   HGB 7.3* 7.2*   HCT 23.1* 24.1*   * 111*     BMP    Recent Labs     02/21/24  2146 02/22/24  0403   SODIUM 140 138   K 4.0 3.9    106   CO2 24 25   AGAP 8 7   BUN 47* 50*   CREATININE 2.14* 2.29*   CALCIUM 8.4 8.3*        Baseline Creat: 1.1   Coags    Recent Labs     02/21/24  0947   INR 1.97*              Additional Electrolytes  Recent Labs     02/21/24  0410 02/22/24  0403   MG 2.3 2.3   PHOS 3.0 3.0   CAIONIZED 1.13 1.11*          Blood Gas    No recent results  No recent results LFTs  No recent results     Infectious    No recent results     No recent results Glucose  Recent Labs     02/21/24  0947 02/21/24  1607 02/21/24  2146 02/22/24  0403   GLUC 159* 121 124 124            Collaborative bedside rounds performed with cardiac surgery attending, critical care attending and bedside RN.     Wan Lowry PA-C

## 2024-02-22 NOTE — RESPIRATORY THERAPY NOTE
RT Ventilator Management Note  Natividad Ortiz 83 y.o. female MRN: [id number]  Unit/Bed#: [facility] 415-01 Encounter: [id number]      Daily Screen         2/21/2024  1111 2/21/2024 1928          Patient safety screen outcome:: Passed Passed      Not Ready for Weaning due to:: -- Underline problem not resolved                Physical Exam:   Assessment Type: Assess only  General Appearance: Sleeping  Respiratory Pattern: Assisted  Chest Assessment: Chest expansion symmetrical  Bilateral Breath Sounds: Diminished  Cough: None  O2 Device: vent      Resp Comments: pt remained on documented vent settings overnight. no changes at this time. RT will continue to monitor

## 2024-02-22 NOTE — RESPIRATORY THERAPY NOTE
Resp Vent Note   02/22/24 0705   Respiratory Assessment   Assessment Type Assess only   General Appearance Sleeping   Respiratory Pattern Assisted;Spontaneous   Chest Assessment Chest expansion symmetrical   Bilateral Breath Sounds Clear;Diminished   Resp Comments Pt placed on PS 8/6 40% at this time and tolerating well. Will cont to maddy pt on wean.   O2 Device G5 vent   Vent Information   Vent ID [id number]   Vent type Fair G5   Fair Vent Mode SPONT   $ Pulse Oximetry Spot Check Charge Completed   SPONT Settings   FIO2 (%) 40 %   PEEP (cmH2O) 6 cmH2O   Pressure Support (cmH2O) 8 cmH20   Flow Trigger (LPM) 3 LPM   P-ramp (ms) 50 ms   ETS  (%) 25 %   Humidification Heater   Heater Temp 87.8 °F (31 °C)  (due to rainout)   SPONT Actuals   Resp Rate (BPM) 21 BPM   VT (mL) 250 mL   MV (Obs) 6.1   MAP (cmH2O) 8.1 cmH2O   Peak Pressure (cmH2O) 15 cmH2O   I:E Ratio (Obs) 1:3.9   RSBI (f/vt) 70 f/Vt   Heater Temperature (Obs) 87.8 °F (31 °C)   SPONT Alarms   High Peak Pressure (cmH20) 40 cmH2O   High Resp Rate (BPM) 40 BPM   High MV (L/min) 20 L/min   Low MV (L/min) 2 L/min   High Spont VTE (mL) 1000 mL   Low Spont VTE (mL) 200 mL   Apnea Time (S) 20 S   SPONT Apnea Settings   Resp Rate (BPM) 15 BPM  (PCMV)   FIO2 (%) 40 %   %TI (%) 1.3 %   Apnea Time (S) 20 S   Maintenance   Alarm (pink) cable attached No   Resuscitation bag with peep valve at bedside Yes   Water bag changed No   Circuit changed No   Daily Screen   Patient safety screen outcome: Passed   Not Ready for Weaning due to: Underline problem not resolved   IHI Ventilator Associated Pneumonia Bundle   Daily Assessment of Readiness to Extubate Yes   Head of Bed Elevated HOB 30   ETT  Hi-Lo;Cuffed;Oral 7.5 mm   Placement Date/Time: 02/19/24 0750   Mask Ventilation: (c) Ventilated by mask with oral airway (2)  Preoxygenated: Yes  Technique: Direct laryngoscopy  Type: Hi-Lo;Cuffed;Oral  Tube Size: 7.5 mm  Laryngoscope: Mac  Blade Size: 3  Location: Oral   Grade...   Secured at (cm) 23   Measured from Lips   Secured Location Center   Secured by Commercial tube macedo   Site Condition Dry   Cuff Pressure (color) Green   HI-LO Suction  Intermittent suction   HI-LO Secretions Scant   HI-LO Intervention Patent

## 2024-02-22 NOTE — PLAN OF CARE
Problem: SAFETY,RESTRAINT: NV/NON-SELF DESTRUCTIVE BEHAVIOR  Goal: Remains free of harm/injury (restraint for non violent/non self-detsructive behavior)  Description: INTERVENTIONS:  - Instruct patient/family regarding restraint use   - Assess and monitor physiologic and psychological status   - Provide interventions and comfort measures to meet assessed patient needs   - Identify and implement measures to help patient regain control  - Assess readiness for release of restraint   Outcome: Progressing  Goal: Returns to optimal restraint-free functioning  Description: INTERVENTIONS:  - Assess the patient's behavior and symptoms that indicate continued need for restraint  - Identify and implement measures to help patient regain control  - Assess readiness for release of restraint   Outcome: Progressing     Problem: CARDIOVASCULAR - ADULT  Goal: Maintains optimal cardiac output and hemodynamic stability  Description: INTERVENTIONS:  - Monitor I/O, vital signs and rhythm  - Monitor for S/S and trends of decreased cardiac output  - Administer and titrate ordered vasoactive medications to optimize hemodynamic stability  - Assess quality of pulses, skin color and temperature  - Assess for signs of decreased coronary artery perfusion  - Instruct patient to report change in severity of symptoms  Outcome: Progressing  Goal: Absence of cardiac dysrhythmias or at baseline rhythm  Description: INTERVENTIONS:  - Continuous cardiac monitoring, vital signs, obtain 12 lead EKG if ordered  - Administer antiarrhythmic and heart rate control medications as ordered  - Monitor electrolytes and administer replacement therapy as ordered  Outcome: Progressing     Problem: RESPIRATORY - ADULT  Goal: Achieves optimal ventilation and oxygenation  Description: INTERVENTIONS:  - Assess for changes in respiratory status  - Assess for changes in mentation and behavior  - Position to facilitate oxygenation and minimize respiratory effort  - Oxygen  administered by appropriate delivery if ordered  - Initiate smoking cessation education as indicated  - Encourage broncho-pulmonary hygiene including cough, deep breathe, Incentive Spirometry  - Assess the need for suctioning and aspirate as needed  - Assess and instruct to report SOB or any respiratory difficulty  - Respiratory Therapy support as indicated  Outcome: Progressing     Problem: METABOLIC, FLUID AND ELECTROLYTES - ADULT  Goal: Electrolytes maintained within normal limits  Description: INTERVENTIONS:  - Monitor labs and assess patient for signs and symptoms of electrolyte imbalances  - Administer electrolyte replacement as ordered  - Monitor response to electrolyte replacements, including repeat lab results as appropriate  - Instruct patient on fluid and nutrition as appropriate  Outcome: Progressing  Goal: Fluid balance maintained  Description: INTERVENTIONS:  - Monitor labs   - Monitor I/O and WT  - Instruct patient on fluid and nutrition as appropriate  - Assess for signs & symptoms of volume excess or deficit  Outcome: Progressing  Goal: Glucose maintained within target range  Description: INTERVENTIONS:  - Monitor Blood Glucose as ordered  - Assess for signs and symptoms of hyperglycemia and hypoglycemia  - Administer ordered medications to maintain glucose within target range  - Assess nutritional intake and initiate nutrition service referral as needed  Outcome: Progressing

## 2024-02-22 NOTE — PHYSICAL THERAPY NOTE
Physical Therapy Cancellation Note                 02/22/24 0808   Note Type   Note type Cancelled Session   Cancel Reasons Intubated/sedated     Dimitry Bocanegra

## 2024-02-23 NOTE — PROGRESS NOTES
[facility]  Progress Note: Critical Care   Date of Service: 2/23/2024  Hospital Day: 4  Name: Natividad Ortiz I  MRN: [id number]  [facility]      Assessment/Plan     Impressions:  Severe AS s/p aortic valve replacement with pericardial tissue valve  ABLA  Acute respiratory insufficiency  Paroxysmal atrial fibrillation on chronic anticoagulation  Sick sinus syndrome s/p PPM  Chronic diastolic CHF  GERD  Carrero's esophagus  CKD 3  Lung nodules  Venous insufficiency  Left breast CA s/p lumpectomy/radiation  CHAYO    Neuro:   Analgesia:    ATC tylenol  PRN oxycodone 2.5-5 mg  Delirium precautions  Regulate sleep/wake cycle  Restoril 15mg qHS PRN  CAM-ICU daily  Routine neuro checks     CV:   Hemodynamic monitoring goals:  MAP > 65   SBP < 130  Continue central line due to vasoactive medications  Continue arterial line for blood pressure monitoring and/or frequent ABG's  Current cardiac infusions:    Amiodarone 1 mg/min  Discontinue today  Beta Blocker:    Continue metoprolol tartrate 25 mg BID- consider increasing to 50 BID  Epicardial Pacing Wires:    Epicardial pacing wire plan : Epicardial wires not in place  Post op cardiac surgery medications:     mg daily  Atorvastatin 80 mg qHS  Amiodarone 200 mg PO q8 hours   Continue telemetry monitoring   Coumadin dosing (2.5, 2.5)    Lung:   Acute post-op pulmonary insufficiency requiring BiPAP. Secondary to poor inspiratory effort secondary to pain, pulmonary vascular congestion, and atelectasis  Chest tubes have been discontinued  Continue incentive spirometry  Encourage coughing and deep breathing     GI:   Continue PPI for stress ulcer prophylaxis  Bowel regimen:  Colace BID  Miralax QD  Dulcolax QD PRN  Monitor abdominal exam and bowel function    FEN:   Diuresis Plan: Bumex infusion 2 mg/hr.  Check Q6 BMPs  Nutrition plan: Speech evaluation pending.   Replenish electrolytes with goals: K >4.0, Mag >2.0, and  Phos >3.0    :   Strict I/O monitoring   Continue to trend renal function tests      ID:   Monitor WBC and temps   Maintain normothermia    Heme:   Trend Hgb and plts  Transfuse as needed     Endo:   Sliding Scale Insulin coverage.   Adjust insulin regimen as needed to maintain -180    MSK/Skin:  Early mobility and skin protection:   PT/OT consult as medically appropriate   Frequent turning and pressure off-loading  Local wound care as needed       Disposition:  Critical care    ICU Core Measures     A: Assess, Prevent, and Manage Pain Has pain been assessed? Yes  Need for changes to pain regimen? No   B: Both SAT/SAT  N/A   C: Choice of Sedation RASS Goal: N/A patient not on sedation  Need for changes to sedation or analgesia regimen? NA   D: Delirium CAM-ICU: Negative   E: Early Mobility  Plan for early mobility? Yes   F: Family Engagement Plan for family engagement today? Yes       Review of Invasive Devices:    Ledesma Plan: Continue for accurate I/O monitoring for 48 hours  Central access plan: Medications requiring central line  Sunita Plan: Keep arterial line for hemodynamic monitoring    Prophylaxis:  VTE VTE covered by:  heparin (porcine), Subcutaneous, 5,000 Units at 02/22/24 2117       Stress Ulcer  covered byfamotidine (PEPCID) 40 MG tablet [[id number]] (Long-Term Med), pantoprazole (PROTONIX) 20 mg tablet [[id number]] (Long-Term Med), pantoprazole (PROTONIX) EC tablet 40 mg [[id number]]          Significant 24hr Events      24 Hour Events/HPI: POD # 4 s/p tissue AVR. Making adequate urine with bumex infusion. Worsening respiratory status requiring BIPAP.      Subjective   Review of Systems   Unable to perform ROS: Severe respiratory distress        Objective     Medications:  Scheduled Continuous   acetaminophen, 650 mg, Q6H While awake  amiodarone 150 mg in dextrose 5 % 100 mL IV bolus, 150 mg, Once  amiodarone, 200 mg, Q8H ANTON  ascorbic acid, 500 mg, BID  aspirin, 325 mg, Daily  atorvastatin, 80 mg,  Daily With Dinner  chlorhexidine, 15 mL, BID  gabapentin, 800 mg, TID  heparin (porcine), 5,000 Units, Q8H ANTON  insulin lispro, 1-6 Units, Q6H ANTON  levalbuterol, 1.25 mg, Q6H  metoprolol tartrate, 25 mg, Q12H ANTON  mupirocin, 1 Application, Q12H ANTON  pantoprazole, 40 mg, Early Morning  polyethylene glycol, 17 g, Daily  potassium chloride, 40 mEq, Once  pramipexole, 1 mg, HS  sodium chloride, 4 mL, Q6H      amiodarone (CORDARONE) 900 mg in dextrose 5 % 500 mL infusion, 1 mg/min, Last Rate: 1 mg/min (02/23/24 5990)  bumetanide (BUMEX) 12.5 mg infusion 50 mL, 2 mg/hr, Last Rate: 2 mg/hr (02/23/24 0317)         Vitals: Invasive Monitoring       Most Recent Min/Max in 24hrs   Temp 98 °F (36.7 °C) Temp  Min: 97.7 °F (36.5 °C)  Max: 99.1 °F (37.3 °C)   Pulse 85 Pulse  Min: 80  Max: 106   Resp (!) 28 Resp  Min: 20  Max: 39   /57 BP  Min: 99/57  Max: 158/141   O2 Sat 100 % SpO2  Min: 90 %  Max: 100 %     Bipap Settings  Non-Invasive Ventilation Mode: BiPAP  IPAP (cm): 12 cm  EPAP (cm): 6 cm   FiO2: 40%  SpO2: SpO2: 100 %     Arterial Line  Sunita /49  Arterial Line BP  Min: 88/48  Max: 147/147   MAP (!) 64 mmHg  Arterial Line MAP (mmHg)  Min: 56 mmHg  Max: 147 mmHg      I/O Chest tube output (if present)     Intake/Output Summary (Last 24 hours) at 2/23/2024 0609  Last data filed at 2/23/2024 0400  Gross per 24 hour   Intake 1491.27 ml   Output 3120 ml   Net -1628.73 ml     UOP: 125/hour    BM: 0 in the last 24 hours  Weight change:           Physical Exam   Physical Exam  Vitals reviewed.   Skin:     General: Skin is warm and dry.   Neck:      Vascular: Central line present.   Cardiovascular:      Rate and Rhythm: Normal rate. Rhythm irregular.      Heart sounds: No murmur heard.     No friction rub.   Constitutional:       General: She is not in acute distress.     Appearance: She is well-developed. She is ill-appearing.      Interventions: Face mask in place.   Pulmonary:      Breath sounds: Examination of the  right-lower field reveals decreased breath sounds. Examination of the left-lower field reveals decreased breath sounds. Decreased breath sounds present.      Comments: Decreased effort  Neurological:      General: No focal deficit present.      Mental Status: She is alert.   Genitourinary/Anorectal:  Ledesma present.        Diagnostic Studies      02/23/24 CXR: Bibasilar atelectasis. Pulm edema.   This was personally reviewed by myself in PACS.             Labs:  CBC    Recent Labs     02/22/24  0403 02/23/24  0401   WBC 17.65* 15.76*   HGB 7.2* 7.9*   HCT 24.1* 26.4*   * 138*     BMP    Recent Labs     02/22/24  2109 02/23/24  0407   SODIUM 139 139   K 3.8 3.6    102   CO2 30 28   AGAP 8 9   BUN 57* 61*   CREATININE 2.41* 2.28*   CALCIUM 9.4 8.7      Baseline Creat: 1.0   Coags    Recent Labs     02/22/24  0617 02/23/24  0401   INR 1.93* 1.86*              Additional Electrolytes  Recent Labs     02/22/24  0403 02/23/24  0401 02/23/24  0407   MG 2.3  --  2.4   PHOS 3.0  --  3.7   CAIONIZED 1.11* 1.12  --           Blood Gas    Recent Labs     02/23/24  0401   PHART 7.352   DVG6OOS 51.0*   PO2ART 105.2   YIY1CMX 27.7   BEART 1.7   SOURCE Line, Arterial     Recent Labs     02/22/24  2356 02/23/24  0137 02/23/24  0401   PHVEN 7.336  --   --    KPJ3AVI 53.3*  --   --    PO2VEN 36.0  --   --    PLE8DXW 27.9  --   --    BEVEN 1.5  --   --    W2JEXNI 66.4  --   --    SOURCE  --    < > Line, Arterial    < > = values in this interval not displayed.    LFTs  No recent results    Infectious    No recent results     No recent results Glucose  Recent Labs     02/22/24  0958 02/22/24  1554 02/22/24  2109 02/23/24  0407   GLUC 125 130 127 118        Collaborative bedside rounds performed with cardiac surgery attending, critical care attending and bedside RN.     Iliana Mckeon PA-C

## 2024-02-23 NOTE — SPEECH THERAPY NOTE
Speech-Language Pathology Bedside Swallow Evaluation      Patient Name: Natividad Ortiz    Today's Date: 2/23/2024     Problem List  Principal Problem:    S/P AVR  Active Problems:    Hiatal hernia    Atrial fibrillation (HCC) [I48.91]    Acquired deformity of foot    Diabetic polyneuropathy associated with type 2 diabetes mellitus (HCC)    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HCC)    RLS (restless legs syndrome)    Essential hypertension    Multiple lung nodules    Severe obesity (BMI 35.0-39.9) with comorbidity (HCC)    GERD (gastroesophageal reflux disease)    Chronic edema    Deep venous insufficiency    Stage 3a chronic kidney disease (HCC)    Chronic diastolic CHF (congestive heart failure) (HCC)    Aortic stenosis, severe      Past Medical History  Past Medical History:   Diagnosis Date    Arthritis     Atrial fibrillation (HCC)     Carrero's esophagus     last assessed: 1/23/2018    BRCA1 negative     BRCA2 negative     Breast cancer (HCC) 2006    stage 1 (left), given adjuvant radiation with Arimidex x 5 years    Cancer (HCC) 2006    Left Breast, Lumpectomy    Cataract     last assessed: 3/11/2014    Chronic diastolic CHF (congestive heart failure) (HCC) 11/21/2022    Dysplasia of toenail     last assessed: 8/29/2017    Esophageal reflux     GERD (gastroesophageal reflux disease)     Gross hematuria     last assessed: 2/19/2015    Hematuria     Hiatal hernia     History of radiation therapy     Hypertension     Irregular heart beat     AFIB    Mixed sensory-motor polyneuropathy     Neuropathy     Obesity     Pacemaker     Paroxysmal atrial fibrillation (HCC)     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    Peripheral neuropathy     Rectal bleeding     Restless leg syndrome     Shortness of breath     last assessed: 1/11/2016       Past Surgical History  Past Surgical History:   Procedure Laterality Date    BREAST BIOPSY Left 2006    BREAST LUMPECTOMY Left 2006    onset: 2006    BREAST SURGERY      CARDIAC  CATHETERIZATION N/A 9/5/2023    Procedure: Cardiac RHC/LHC;  Surgeon: Patric England MD;  [facility];  Service: Cardiology    CARDIAC CATHETERIZATION N/A 9/5/2023    Procedure: Cardiac Coronary Angiogram;  Surgeon: Patric England MD;  [facility];  Service: Cardiology    CARDIAC CATHETERIZATION  9/5/2023    Procedure: Cardiac catheterization;  Surgeon: Patric England MD;  [facility]: [facility];  Service: Cardiology    CARDIAC PACEMAKER PLACEMENT      x 3 2006    CATARACT EXTRACTION      COLONOSCOPY      CYSTOSCOPY  04/04/2014    diagnostic    HYSTERECTOMY      ALISON BSO; due to fibroid uterus; age 42    KNEE CARTILAGE SURGERY      excision lesion of meniscus or capsule knee    KNEE SURGERY      OOPHORECTOMY Bilateral     age 42    OTHER SURGICAL HISTORY      radiation therapy    CO ARTHRP KNE CONDYLE&PLATU MEDIAL&LAT COMPARTMENTS Left 08/17/2020    Procedure: ARTHROPLASTY KNEE TOTAL;  Surgeon: Melquiades Sterling DO;  Location: [facility];  Service: Orthopedics    CO ARTHRP KNE CONDYLE&PLATU MEDIAL&LAT COMPARTMENTS Right 03/28/2022    Procedure: ARTHROPLASTY KNEE TOTAL W ROBOT - RIGHT;  Surgeon: Melquiades Sterling DO;  Location: [facility];  Service: Orthopedics    CO COLONOSCOPY FLX DX W/COLLJ SPEC WHEN PFRMD N/A 02/08/2017    Procedure: COLONOSCOPY;  Surgeon: Desean Ham MD;  Location: [facility];  Service: Gastroenterology    CO ESOPHAGOGASTRODUODENOSCOPY TRANSORAL DIAGNOSTIC N/A 09/20/2017    Procedure: ESOPHAGOGASTRODUODENOSCOPY (EGD);  Surgeon: Jacob Morrell MD;  [facility]: [facility];  Service: Gastroenterology    CO RPLCMT AORTIC VALVE OPN W/STENTLESS TISSUE VALVE N/A 2/19/2024    Procedure: REPLACEMENT VALVE AORTIC (AVR) WITH  21mm INSPRIS TISSUE VALVE;  Surgeon: Liang Mccullough DO;  Location: [facility];  Service: Cardiac Surgery    UPPER GASTROINTESTINAL ENDOSCOPY         Summary   Pt presented with s/s suggestive of mild oral and suspected minimal pharyngeal dysphagia.   Symptoms or concerns included decreased bolus propulsion, decreased mastication, and decreased bolus formation suspected decreased hyolaryngeal elevation upon palpation and suspected pharyngeal residue.  Pt s/p prolonged intubation with mildly hoarse voice. Less cough today with single sips     Risk/s for Aspiration: generalized weakness, lethargy, intubation     Recommended Diet: soft/level 3 diet and thin liquids   Recommended Form of Meds: whole with puree and crushed with puree   Aspiration precautions and swallowing strategies: upright posture, only feed when fully alert, slow rate of feeding, small bites/sips, and alternating bites and sips  Other Recommendations: Continue frequent oral care, will follow        Current Medical Status  Pt is a 83 y.o. female who presented to [facility]24 for  AVR with pericardial tissue valve. Difficulty weaning from the vent as well as high CT output.   Pt extubated 2/22/24.  Bipap overnight.  Pt trialed w/ diet yesterday w/ nsg.  +coughing and congestion.       Current Precautions:  Fall  Aspiration    Allergies:  No known food allergies    Past medical history:  Please see H&P for details  Severe AS s/p Surgical AVR  PAF on Xarelto s/p 2005 ablation and multiple failed cardioversion  SSS s/p PPM   chronic diastolic CHF  GERD  Barretts esophagus  CKD3a  multiple lung nodules  venous insufficiency  L breast Ca s/p lumpectomy/radiation  Special Studies:  CXR-Diminished pulmonary edema and bilateral pleural effusions   Stable cardiomegaly status post CABG    Social/Education/Vocational Hx:  Pt lives with family    Swallow Information   Current Risks for Dysphagia & Aspiration: recent surgery, recent intubation, and prolonged intubation  Current Symptoms/Concerns: coughing with po  Current Diet: NPO except medications   Baseline Diet: regular diet and thin liquids      Baseline Assessment   Behavior/Cognition: alert and lethargic  Speech/Language Status: able to  participate in basic conversation and able to follow commands  Patient Positioning: upright in chair  Pain Status/Interventions/Response to Interventions:   No report of or nonverbal indications of pain.       Swallow Mechanism Exam  Facial: symmetrical  Labial: WFL  Lingual:  decreased protrusion  Velum: symmetrical  Mandible: adequate ROM  Dentition: adequate  Vocal quality:clear/adequate and weak   Volitional Cough:  fair    Respiratory Status: on NC    Consistencies Assessed and Performance   Consistencies Administered: ice chips, thin liquids, puree, mechanical soft solids, and hard solids  Materials administered included toast, cookies, thin water and juice by cup/straw, applesauce.     Oral Stage: mild, decreased bolus acceptance, decreased bolus propulsion, decreased mastication, and decreased bolus formation  Mastication was slow and prolonged with the materials administered today.  Bolus formation and transfer were fair with mild  oral residue noted.      Pharyngeal Stage: suspected, minimal, suspected decreased hyolaryngeal elevation upon palpation, suspected pharyngeal residue, and multiple swallows  Swallow Mechanics:  Swallowing initiation appeared prompt.  Laryngeal rise was palpated and judged to be mildly reduced.  No coughing, throat clearing  noted today. Periodical wet voice she is able to use a 2* swallow to clear.     Esophageal Concerns: none reported    Strategies and Efficacy: smaller single sips- no coughing    Summary and Recommendations (see above)    Results Reviewed with: patient and RN     Treatment Recommended: yes     Frequency of treatment: as able     Patient Stated Goal:-    Dysphagia LTG  -Patient will demonstrate safe and effective oral intake (without overt s/s significant oral/pharyngeal dysphagia including s/s penetration or aspiration) for the highest appropriate diet level.     Short Term Goals:    -Pt will tolerate Dysphagia 3/advanced (dental soft) diet and  thin liquid  by single sips with no significant s/s oral or pharyngeal dysphagia across 1-3 diagnostic session/s.    -Patient will tolerate trials of upgraded food and/or liquid texture with no significant s/s of oral or pharyngeal dysphagia including aspiration across 1-3 diagnostic sessions     -Patient will comply with a Video/Modified Barium Swallow study for more complete assessment of swallowing anatomy/physiology/aspiration risk and to assess efficacy of treatment techniques so as to best guide treatment plan    Speech Therapy Prognosis   Prognosis: good    Prognosis Considerations: medical status and cognitive status

## 2024-02-23 NOTE — PHYSICAL THERAPY NOTE
Physical Therapy Evaluation     Patient's Name: Natividad Oritz    Admitting Diagnosis  Nonrheumatic aortic valve stenosis [I35.0]    Problem List  Patient Active Problem List   Diagnosis    Hiatal hernia    Atrial fibrillation (HCC) [I48.91]    Acquired deformity of foot    Corns    Diabetic polyneuropathy associated with type 2 diabetes mellitus (HCC)    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HCC)    Radiculopathy of lumbar region    Sensory polyneuropathy    RLS (restless legs syndrome)    Arthritis    Essential hypertension    Lichen sclerosus et atrophicus    Multiple lung nodules    Severe obesity (BMI 35.0-39.9) with comorbidity (HCC)    Osteopenia of multiple sites    Peripheral neuropathy    Vitamin D deficiency    Dense breast tissue on mammogram    Difficulty walking    Flat foot    Onychomycosis    Carrero's esophagus with dysplasia    Mild cognitive impairment    Pacemaker    GERD (gastroesophageal reflux disease)    Chronic edema    Deep venous insufficiency    Colon polyps    Lichen sclerosus et atrophicus of the vulva    Status post total knee replacement using cement, left    Leg swelling    Seasonal allergies    Exotropia of right eye    Stage 3a chronic kidney disease (HCC)    Status post total knee replacement using cement, right    Coagulopathy (HCC)    Chronic diastolic CHF (congestive heart failure) (HCC)    Aortic stenosis, severe    Chronic left shoulder pain    Non-healing wound of left lower extremity    Cellulitis of right leg    Chronic venous hypertension (idiopathic) with ulcer of right lower extremity (CODE) (HCC)    S/P AVR       Past Medical History  Past Medical History:   Diagnosis Date    Arthritis     Atrial fibrillation (HCC)     Carrero's esophagus     last assessed: 1/23/2018    BRCA1 negative     BRCA2 negative     Breast cancer (HCC) 2006    stage 1 (left), given adjuvant radiation with Arimidex x 5 years    Cancer (HCC) 2006    Left Breast, Lumpectomy    Cataract     last  assessed: 3/11/2014    Chronic diastolic CHF (congestive heart failure) (HCC) 11/21/2022    Dysplasia of toenail     last assessed: 8/29/2017    Esophageal reflux     GERD (gastroesophageal reflux disease)     Gross hematuria     last assessed: 2/19/2015    Hematuria     Hiatal hernia     History of radiation therapy     Hypertension     Irregular heart beat     AFIB    Mixed sensory-motor polyneuropathy     Neuropathy     Obesity     Pacemaker     Paroxysmal atrial fibrillation (HCC)     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    Peripheral neuropathy     Rectal bleeding     Restless leg syndrome     Shortness of breath     last assessed: 1/11/2016       Past Surgical History  Past Surgical History:   Procedure Laterality Date    BREAST BIOPSY Left 2006    BREAST LUMPECTOMY Left 2006    onset: 2006    BREAST SURGERY      CARDIAC CATHETERIZATION N/A 9/5/2023    Procedure: Cardiac RHC/LHC;  Surgeon: Patric England MD;  [facility];  Service: Cardiology    CARDIAC CATHETERIZATION N/A 9/5/2023    Procedure: Cardiac Coronary Angiogram;  Surgeon: Patric England MD;  [facility];  Service: Cardiology    CARDIAC CATHETERIZATION  9/5/2023    Procedure: Cardiac catheterization;  Surgeon: Patric England MD;  [facility];  Service: Cardiology    CARDIAC PACEMAKER PLACEMENT      x 3 2006    CATARACT EXTRACTION      COLONOSCOPY      CYSTOSCOPY  04/04/2014    diagnostic    HYSTERECTOMY      ALISON BSO; due to fibroid uterus; age 42    KNEE CARTILAGE SURGERY      excision lesion of meniscus or capsule knee    KNEE SURGERY      OOPHORECTOMY Bilateral     age 42    OTHER SURGICAL HISTORY      radiation therapy    FL ARTHRP KNE CONDYLE&PLATU MEDIAL&LAT COMPARTMENTS Left 08/17/2020    Procedure: ARTHROPLASTY KNEE TOTAL;  Surgeon: Melquiades Sterling DO;  Location: [facility];  Service: Orthopedics    FL ARTHRP KNE CONDYLE&PLATU MEDIAL&LAT COMPARTMENTS Right 03/28/2022    Procedure:  ARTHROPLASTY KNEE TOTAL W ROBOT - RIGHT;  Surgeon: Melquiades Sterling DO;  Location: [facility];  Service: Orthopedics    MI COLONOSCOPY FLX DX W/COLLJ SPEC WHEN PFRMD N/A 02/08/2017    Procedure: COLONOSCOPY;  Surgeon: Desean Ham MD;  Location: [facility];  Service: Gastroenterology    MI ESOPHAGOGASTRODUODENOSCOPY TRANSORAL DIAGNOSTIC N/A 09/20/2017    Procedure: ESOPHAGOGASTRODUODENOSCOPY (EGD);  Surgeon: Jacob Morrell MD;  [facility]: [facility];  Service: Gastroenterology    MI RPLCMT AORTIC VALVE OPN W/STENTLESS TISSUE VALVE N/A 2/19/2024    Procedure: REPLACEMENT VALVE AORTIC (AVR) WITH  21mm INSPRIS TISSUE VALVE;  Surgeon: Liang Mccullough DO;  Location:  MAIN OR;  Service: Cardiac Surgery    UPPER GASTROINTESTINAL ENDOSCOPY            02/23/24 0923   PT Last Visit   PT Visit Date 02/23/24   Note Type   Note type Evaluation   Pain Assessment   Pain Assessment Tool FLACC   Pain Location/Orientation Orientation: Mid;Location: Chest;Location: Incision   Pain Onset/Description Onset: Ongoing;Frequency: Intermittent;Descriptor: Aching;Descriptor: Discomfort   Effect of Pain on Daily Activities guarding   Patient's Stated Pain Goal No pain   Hospital Pain Intervention(s) Repositioned;Ambulation/increased activity;Emotional support   Pain Rating: FLACC (Rest) - Face 0   Pain Rating: FLACC (Rest) - Legs 0   Pain Rating: FLACC (Rest) - Activity 0   Pain Rating: FLACC (Rest) - Cry 0   Pain Rating: FLACC (Rest) - Consolability 0   Score: FLACC (Rest) 0   Pain Rating: FLACC (Activity) - Face 1   Pain Rating: FLACC (Activity) - Legs 0   Pain Rating: FLACC (Activity) - Activity 0   Pain Rating: FLACC (Activity) - Cry 1   Pain Rating: FLACC (Activity) - Consolability 1   Score: FLACC (Activity) 3   Restrictions/Precautions   Braces or Orthoses   (denies)   Other Precautions Cardiac/sternal;Multiple lines;Telemetry;O2;Fall Risk;Pain  (a-line)   Home Living   Type of Home House   Home Layout Two level;Able to live on main level  with bedroom/bathroom  (1st floor set-up w/ 3 BREEZY w/ hand rail)   Home Equipment Cane   Prior Function   Level of [place] Independent with functional mobility  (amb w/ SPC outside)   Lives With Spouse   General   Additional Pertinent History cleared for assessment by silvia   Cognition   Overall Cognitive Status WFL   Arousal/Participation Cooperative   Orientation Level Oriented to person;Oriented to place;Oriented to situation   Memory Decreased recall of recent events;Decreased recall of precautions   Following Commands Follows one step commands without difficulty   Subjective   Subjective Alert; in the chair; somewhat emotionally labile; cooperative; agreeable to try mobilization   RUE Assessment   RUE Assessment WFL  (AROM)   LUE Assessment   LUE Assessment WFL  (AROM)   RLE Assessment   RLE Assessment X  (decreased AROM at the hip)   Strength RLE   RLE Overall Strength   (fair - hip; fair knee and ankle (grossly))   LLE Assessment   LLE Assessment X  (decreased AROM at the hip)   Strength LLE   LLE Overall Strength   (fair - hip; fair knee and ankle (grossly))   Transfers   Sit to Stand 2  Maximal assistance   Additional items Assist x 2;Increased time required;Verbal cues  (2 trials)   Stand to Sit 2  Maximal assistance   Additional items Assist x 2;Increased time required;Verbal cues  (2 trials)   Ambulation/Elevation   Gait pattern Not appropriate;Not tested   Assistive Device Rolling walker   Balance   Static Sitting Fair -   Dynamic Sitting Poor +   Static Standing Poor  (10 sec trials w/ stand by (A) of 3rd for knees guarding)   Dynamic Standing Poor -   Activity Tolerance   Activity Tolerance Patient limited by fatigue   Medical Staff Made Aware Co-eval performed w/ OTR due to complexity of medical status and multiple comorbidities   Nurse Made Aware spoke to NAJMA Ayoub   Assessment   Prognosis Fair   Problem List Decreased strength;Decreased endurance;Impaired balance;Decreased mobility;Obesity;Pain    Assessment Pt is 83 y.o. female admitted with Dx of  Symptomatic severe aortic stenosis and underwent Aortic valve replacement with a 21mm Echevarria Inspiris Resilia pericardial tissue valve on 2/19/2024; postop course included: ABLA, Acute respiratory insufficiency, CHAYO. Pt 's comorbidities affecting POC include:  Arthritis, Atrial fibrillation (HCC), Chronic diastolic CHF (congestive heart failure) (HCC), Hypertension, Irregular heart beat, Mixed sensory-motor polyneuropathy, Neuropathy, Obesity, Pacemaker, 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HCC), Peripheral neuropathy, and Restless leg syndrome and personal factors of: advanced age and BREEZY. Pt's clinical presentation is currently unstable/unpredictable which is evident in ongoing telemetry monitoring while in a critical care unit w/ a-line in place, abnormal lab values being monitored/trending, suppl O2 needs and inability to progress further w/ mobilization at this time. Pt presents w/ postop guarding, generalized weakness, incl decreased LE strength, decreased functional endurance and activity tolerance, impaired balance, transfers difficulty and inability to progress to amb at this time. Will cont to follow pt in PT for progressive mobilization to address above functional deficits and to max level of (I), endurance, and safety. Currently recommend  rehab upon D/C. Will cont to follow until then.   Barriers to Discharge Inaccessible home environment   Goals   Patient Goals to feel better   LTG Expiration Date 03/08/24   Long Term Goal #1 10-14 days. Pt will achieve min (A)x1 level w/ bed mob in order to facilitate safety with OOB and back to bed transitions in prior living environment to decrease burden of care. Pt will perform transfers w/ mod (A)x1 to assure safety w/ functional mobility/transitions w/ all aspects of mobility/locomotion. Pt will sustain at least poor + standing supported balance x 1 min to facilitate progression w/ SPT transitions. Pt will  participate in LE therex to max progression w/ mobility skills. Assess amb when clinically appropriate   PT Treatment Day 0   Plan   Treatment/Interventions Functional transfer training;LE strengthening/ROM;Therapeutic exercise;Endurance training;Bed mobility;Gait training;Spoke to nursing;OT   PT Frequency 3-5x/wk   Discharge Recommendation   Rehab Resource Intensity Level, PT I (Maximum Resource Intensity)   AM-PAC Basic Mobility Inpatient   Turning in Flat Bed Without Bedrails 1   Lying on Back to Sitting on Edge of Flat Bed Without Bedrails 1   Moving Bed to Chair 1   Standing Up From Chair Using Arms 1   Walk in Room 1   Climb 3-5 Stairs With Railing 1   Basic Mobility Inpatient Raw Score 6   Turning Head Towards Sound 4   Follow Simple Instructions 4   Low Function Basic Mobility Raw Score  14   Low Function Basic Mobility Standardized Score  22.01   Highest Level Of Mobility   JH-HLM Goal 2: Bed activities/Dependent transfer   JH-HLM Achieved 3: Sit at edge of bed   Modified Mansi Scale   Modified Mansi Scale 4   End of Consult   Patient Position at End of Consult Bedside chair;All needs within reach         Dimitry Bocanegra, PT

## 2024-02-23 NOTE — PROGRESS NOTES
[facility]  Interval Progress Note: Critical Care  Name: Natividad Ortiz I  MRN: [id number]  Unit/Bed#: [facility] 415-01 I Date of Admission: 2/19/2024   Date of Service: 2/22/2024 I Hospital Day: 3    Interval Events:  Notified by nursing that 1. Patient failed bedside swallow eval and 2. Patient newly tachypneic with increased work of breathing. Speech eval ordered and patient made NPO. Notably, patient did not eat dinner and only had applesauce with meds this evening. For tachypnea and work of breathing, CXR and ABG ordered. BiPAP also ordered. Plan to continue to monitor respiratory status given recent extubation today. Patient is high risk for requiring re-intubation.      Pertinent New Data:   respirations  Arterial Line  [place] /92  Arterial Line BP  Min: 85/42  Max: 142/64   MAP 87 mmHg  Arterial Line MAP (mmHg)  Min: 54 mmHg  Max: 87 mmHg     I have personally reviewed pertinent lab results.  chest xrayI have personally reviewed pertinent reports.   and I have personally reviewed pertinent films in PACS      Assessment and Plan  Diagnosis: tachypnea and increased work of breathing following extubation today, likely due to combination of poor inspiratory effort secondary to pain, atelectasis, difficulty clearing secretions, and/or volume overload  Plan: STAT CXR, STAT ABG, BiPAP    Billing Level:  Critical Care Time Statement: Upon my evaluation, this patient had a high probability of imminent or life-threatening deterioration due to tachypnea and increased work of breathing following extubation today, which required my direct attention, intervention, and personal management.  I spent a total of 15 minutes directly providing critical care services, including interpretation of complex medical databases, evaluating for the presence of life-threatening injuries or illnesses, management of organ system failure(s) , complex medical decision making (to support/prevent  further life-threatening deterioration)., interpretation of hemodynamic data, and NIPPV management . This time is exclusive of procedures, teaching, family meetings, and any prior time recorded by providers other than myself.      SIGNATURE: Deangelo Matos PA-C

## 2024-02-23 NOTE — PLAN OF CARE
Problem: RESPIRATORY - ADULT  Goal: Achieves optimal ventilation and oxygenation  Description: INTERVENTIONS:  Problem: Knowledge Deficit  Goal: Patient/family/caregiver demonstrates understanding of disease process, treatment plan, medications, and discharge instructions  Description: Complete learning assessment and assess knowledge base.  Interventions:  - Provide teaching at level of understanding  - Provide teaching via preferred learning methods  Outcome: Progressing     Problem: DISCHARGE PLANNING  Goal: Discharge to home or other facility with appropriate resources  Description: INTERVENTIONS:  - Identify barriers to discharge w/patient and caregiver  - Arrange for needed discharge resources and transportation as appropriate  - Identify discharge learning needs (meds, wound care, etc.)  - Arrange for interpretive services to assist at discharge as needed  - Refer to Case Management Department for coordinating discharge planning if the patient needs post-hospital services based on physician/advanced practitioner order or complex needs related to functional status, cognitive ability, or social support system  Outcome: Progressing     Problem: SAFETY ADULT  Goal: Patient will remain free of falls  Description: INTERVENTIONS:  - Educate patient/family on patient safety including physical limitations  - Instruct patient to call for assistance with activity   - Consult OT/PT to assist with strengthening/mobility   - Keep Call bell within reach  - Keep bed low and locked with side rails adjusted as appropriate  - Keep care items and personal belongings within reach  - Initiate and maintain comfort rounds  - Make Fall Risk Sign visible to staff  - Offer Toileting every 2 Hours, in advance of need  - Initiate/Maintain bed alarm  - Obtain necessary fall risk management equipment:   - Apply yellow socks and bracelet for high fall risk patients  - Consider moving patient to room near nurses station  Outcome:  Progressing  Goal: Maintain or return to baseline ADL function  Description: INTERVENTIONS:  -  Assess patient's ability to carry out ADLs; assess patient's baseline for ADL function and identify physical deficits which impact ability to perform ADLs (bathing, care of mouth/teeth, toileting, grooming, dressing, etc.)  - Assess/evaluate cause of self-care deficits   - Assess range of motion  - Assess patient's mobility; develop plan if impaired  - Assess patient's need for assistive devices and provide as appropriate  - Encourage maximum independence but intervene and supervise when necessary  - Involve family in performance of ADLs  - Assess for home care needs following discharge   - Consider OT consult to assist with ADL evaluation and planning for discharge  - Provide patient education as appropriate  Outcome: Progressing  Goal: Maintains/Returns to pre admission functional level  Description: INTERVENTIONS:  - Perform AM-PAC 6 Click Basic Mobility/ Daily Activity assessment daily.  - Set and communicate daily mobility goal to care team and patient/family/caregiver.   - Collaborate with rehabilitation services on mobility goals if consulted  - Perform Range of Motion 4 times a day.  - Reposition patient every 2 hours.  - Dangle patient 4 times a day  - Stand patient 4 times a day  - Ambulate patient 4 times a day  - Out of bed to chair 3 times a day   - Out of bed for meals 3 times a day  - Out of bed for toileting  - Record patient progress and toleration of activity level   Outcome: Progressing     Problem: INFECTION - ADULT  Goal: Absence or prevention of progression during hospitalization  Description: INTERVENTIONS:  - Assess and monitor for signs and symptoms of infection  - Monitor lab/diagnostic results  - Monitor all insertion sites, i.e. indwelling lines, tubes, and drains  - Monitor endotracheal if appropriate and nasal secretions for changes in amount and color  - [place] appropriate cooling/warming  therapies per order  - Administer medications as ordered  - Instruct and encourage patient and family to use good hand hygiene technique  - Identify and instruct in appropriate isolation precautions for identified infection/condition  Outcome: Progressing  Goal: Absence of fever/infection during neutropenic period  Description: INTERVENTIONS:  - Monitor WBC    Outcome: Progressing     Problem: PAIN - ADULT  Goal: Verbalizes/displays adequate comfort level or baseline comfort level  Description: Interventions:  - Encourage patient to monitor pain and request assistance  - Assess pain using appropriate pain scale  - Administer analgesics based on type and severity of pain and evaluate response  - Implement non-pharmacological measures as appropriate and evaluate response  - Consider cultural and social influences on pain and pain management  - Notify physician/advanced practitioner if interventions unsuccessful or patient reports new pain  Outcome: Progressing   - Assess for changes in respiratory status  - Assess for changes in mentation and behavior  - Position to facilitate oxygenation and minimize respiratory effort  - Oxygen administered by appropriate delivery if ordered  - Initiate smoking cessation education as indicated  - Encourage broncho-pulmonary hygiene including cough, deep breathe, Incentive Spirometry  - Assess the need for suctioning and aspirate as needed  - Assess and instruct to report SOB or any respiratory difficulty  - Respiratory Therapy support as indicated  Outcome: Progressing     Problem: CARDIOVASCULAR - ADULT  Goal: Maintains optimal cardiac output and hemodynamic stability  Description: INTERVENTIONS:  - Monitor I/O, vital signs and rhythm  - Monitor for S/S and trends of decreased cardiac output  - Administer and titrate ordered vasoactive medications to optimize hemodynamic stability  - Assess quality of pulses, skin color and temperature  - Assess for signs of decreased coronary  artery perfusion  - Instruct patient to report change in severity of symptoms  Outcome: Progressing  Goal: Absence of cardiac dysrhythmias or at baseline rhythm  Description: INTERVENTIONS:  - Continuous cardiac monitoring, vital signs, obtain 12 lead EKG if ordered  - Administer antiarrhythmic and heart rate control medications as ordered  - Monitor electrolytes and administer replacement therapy as ordered  Outcome: Progressing     Problem: SAFETY,RESTRAINT: NV/NON-SELF DESTRUCTIVE BEHAVIOR  Goal: Remains free of harm/injury (restraint for non violent/non self-detsructive behavior)  Description: INTERVENTIONS:  - Instruct patient/family regarding restraint use   - Assess and monitor physiologic and psychological status   - Provide interventions and comfort measures to meet assessed patient needs   - Identify and implement measures to help patient regain control  - Assess readiness for release of restraint   Outcome: Progressing  Goal: Returns to optimal restraint-free functioning  Description: INTERVENTIONS:  - Assess the patient's behavior and symptoms that indicate continued need for restraint  - Identify and implement measures to help patient regain control  - Assess readiness for release of restraint   Outcome: Progressing     Problem: Prexisting or High Potential for Compromised Skin Integrity  Goal: Skin integrity is maintained or improved  Description: INTERVENTIONS:  - Identify patients at risk for skin breakdown  - Assess and monitor skin integrity  - Assess and monitor nutrition and hydration status  - Monitor labs   - Assess for incontinence   - Turn and reposition patient  - Assist with mobility/ambulation  - Relieve pressure over bony prominences  - Avoid friction and shearing  - Provide appropriate hygiene as needed including keeping skin clean and dry  - Evaluate need for skin moisturizer/barrier cream  - Collaborate with interdisciplinary team   - Patient/family teaching  - Consider wound care  consult   Outcome: Progressing     Problem: METABOLIC, FLUID AND ELECTROLYTES - ADULT  Goal: Electrolytes maintained within normal limits  Description: INTERVENTIONS:  - Monitor labs and assess patient for signs and symptoms of electrolyte imbalances  - Administer electrolyte replacement as ordered  - Monitor response to electrolyte replacements, including repeat lab results as appropriate  - Instruct patient on fluid and nutrition as appropriate  Outcome: Progressing  Goal: Fluid balance maintained  Description: INTERVENTIONS:  - Monitor labs   - Monitor I/O and WT  - Instruct patient on fluid and nutrition as appropriate  - Assess for signs & symptoms of volume excess or deficit  Outcome: Progressing  Goal: Glucose maintained within target range  Description: INTERVENTIONS:  - Monitor Blood Glucose as ordered  - Assess for signs and symptoms of hyperglycemia and hypoglycemia  - Administer ordered medications to maintain glucose within target range  - Assess nutritional intake and initiate nutrition service referral as needed  Outcome: Progressing

## 2024-02-23 NOTE — PLAN OF CARE
Problem: OCCUPATIONAL THERAPY ADULT  Goal: Performs self-care activities at highest level of function for planned discharge setting.  See evaluation for individualized goals.  Description: Treatment Interventions: ADL retraining, Functional transfer training, Endurance training, Patient/family training, Continued evaluation, Cardiac education, Energy conservation, Activityengagement, Equipment evaluation/education          See flowsheet documentation for full assessment, interventions and recommendations.   Outcome: Progressing  Note: Limitation: Decreased ADL status, Decreased Safe judgement during ADL, Decreased cognition, Decreased endurance, Decreased self-care trans, Decreased high-level ADLs  Prognosis: Fair  Assessment: Pt is a 83 y.o. female who was admitted to [facility] on 2/19/2024 with S/P AVR. Pt seen for an OT evaluation per active OT orders.  Pt  has a past medical history of Arthritis, Atrial fibrillation (HCC), Carrero's esophagus, BRCA1 negative, BRCA2 negative, Breast cancer (HCC), Cancer (HCC), Cataract, Chronic diastolic CHF (congestive heart failure) (HCC), Dysplasia of toenail, Esophageal reflux, GERD (gastroesophageal reflux disease), Gross hematuria, Hematuria, Hiatal hernia, History of radiation therapy, Hypertension, Irregular heart beat, Mixed sensory-motor polyneuropathy, Neuropathy, Obesity, Pacemaker, Paroxysmal atrial fibrillation (HCC), 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HCC), Peripheral neuropathy, Rectal bleeding, Restless leg syndrome, and Shortness of breath.  Pt lives with 2  with 3 BREEZY, 1st fl setup, uses a tub shower with GB and raised toilet. Pta, pt was independent w/ ADL/IADL and functional mobility, was (+) driving and was using a cane, may also have a RW. Currently, pt is Mod Ax1 for UB ADL, Max Ax1 for LB ADL, and completed transfers w Max Ax2 with RW, unable to progress to fxnal mobility. Pt currently presents with impairments in the following categories -steps  to enter environment, limited home support, difficulty performing ADLS, and limited insight into deficits activity tolerance, endurance, standing balance/tolerance, sitting balance/tolerance, memory, insight, safety , judgement , and attention . These impairments, as well as pt's fatigue, SOB, LU, pain, and cardiac/sternal precautions  limit pt's ability to safely engage in all baseline areas of occupation, includinggrooming, bathing, dressing, toileting, and functional mobility/transfers.  The patient's raw score on the -PAC Daily Activity Inpatient Short Form is 14. A raw score of less than 19 suggests the patient may benefit from discharge to post-acute rehabilitation services. Please refer to the recommendation of the Occupational Therapist for safe discharge planning. Pt would benefit from continued acute OT services throughout hospital course and following D/C. Plan for OT interventions 2-3x per week. From OT standpoint, recommend post acute rehab services upon D/C. Pt was left seated in bedside chair with all needs within reach.     Rehab Resource Intensity Level, OT: I (Maximum Resource Intensity)

## 2024-02-23 NOTE — PLAN OF CARE
Problem: PHYSICAL THERAPY ADULT  Goal: Performs mobility at highest level of function for planned discharge setting.  See evaluation for individualized goals.  Description: Treatment/Interventions: Functional transfer training, LE strengthening/ROM, Therapeutic exercise, Endurance training, Bed mobility, Gait training, Spoke to nursing, OT          See flowsheet documentation for full assessment, interventions and recommendations.  Note: Prognosis: Fair  Problem List: Decreased strength, Decreased endurance, Impaired balance, Decreased mobility, Obesity, Pain  Assessment: Pt is 83 y.o. female admitted with Dx of  Symptomatic severe aortic stenosis and underwent Aortic valve replacement with a 21mm Echevarria Inspiris Resilia pericardial tissue valve on 2/19/2024; postop course included: ABLA, Acute respiratory insufficiency, CHAYO. Pt 's comorbidities affecting POC include:  Arthritis, Atrial fibrillation (HCC), Chronic diastolic CHF (congestive heart failure) (HCC), Hypertension, Irregular heart beat, Mixed sensory-motor polyneuropathy, Neuropathy, Obesity, Pacemaker, 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HCC), Peripheral neuropathy, and Restless leg syndrome and personal factors of: advanced age and BREEZY. Pt's clinical presentation is currently unstable/unpredictable which is evident in ongoing telemetry monitoring while in a critical care unit w/ a-line in place, abnormal lab values being monitored/trending, suppl O2 needs and inability to progress further w/ mobilization at this time. Pt presents w/ postop guarding, generalized weakness, incl decreased LE strength, decreased functional endurance and activity tolerance, impaired balance, transfers difficulty and inability to progress to amb at this time. Will cont to follow pt in PT for progressive mobilization to address above functional deficits and to max level of (I), endurance, and safety. Currently recommend  rehab upon D/C. Will cont to follow until then.  Barriers to  Discharge: Inaccessible home environment     Rehab Resource Intensity Level, PT: I (Maximum Resource Intensity)    See flowsheet documentation for full assessment.

## 2024-02-23 NOTE — OCCUPATIONAL THERAPY NOTE
Occupational Therapy Evaluation     Patient Name: Natividad Ortiz  Today's Date: 2/23/2024  Problem List  Principal Problem:    S/P AVR  Active Problems:    Hiatal hernia    Atrial fibrillation (HCC) [I48.91]    Acquired deformity of foot    Diabetic polyneuropathy associated with type 2 diabetes mellitus (HCC)    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HCC)    RLS (restless legs syndrome)    Essential hypertension    Multiple lung nodules    Severe obesity (BMI 35.0-39.9) with comorbidity (HCC)    GERD (gastroesophageal reflux disease)    Chronic edema    Deep venous insufficiency    Stage 3a chronic kidney disease (HCC)    Chronic diastolic CHF (congestive heart failure) (HCC)    Aortic stenosis, severe    Past Medical History  Past Medical History:   Diagnosis Date    Arthritis     Atrial fibrillation (HCC)     Carrero's esophagus     last assessed: 1/23/2018    BRCA1 negative     BRCA2 negative     Breast cancer (HCC) 2006    stage 1 (left), given adjuvant radiation with Arimidex x 5 years    Cancer (HCC) 2006    Left Breast, Lumpectomy    Cataract     last assessed: 3/11/2014    Chronic diastolic CHF (congestive heart failure) (HCC) 11/21/2022    Dysplasia of toenail     last assessed: 8/29/2017    Esophageal reflux     GERD (gastroesophageal reflux disease)     Gross hematuria     last assessed: 2/19/2015    Hematuria     Hiatal hernia     History of radiation therapy     Hypertension     Irregular heart beat     AFIB    Mixed sensory-motor polyneuropathy     Neuropathy     Obesity     Pacemaker     Paroxysmal atrial fibrillation (HCC)     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    Peripheral neuropathy     Rectal bleeding     Restless leg syndrome     Shortness of breath     last assessed: 1/11/2016     Past Surgical History  Past Surgical History:   Procedure Laterality Date    BREAST BIOPSY Left 2006    BREAST LUMPECTOMY Left 2006    onset: 2006    BREAST SURGERY      CARDIAC CATHETERIZATION N/A 9/5/2023     Procedure: Cardiac RHC/LHC;  Surgeon: Patric England MD;  [facility];  Service: Cardiology    CARDIAC CATHETERIZATION N/A 9/5/2023    Procedure: Cardiac Coronary Angiogram;  Surgeon: Patric England MD;  [facility]: [facility];  Service: Cardiology    CARDIAC CATHETERIZATION  9/5/2023    Procedure: Cardiac catheterization;  Surgeon: Patric England MD;  [facility];  Service: Cardiology    CARDIAC PACEMAKER PLACEMENT      x 3 2006    CATARACT EXTRACTION      COLONOSCOPY      CYSTOSCOPY  04/04/2014    diagnostic    HYSTERECTOMY      ALISON BSO; due to fibroid uterus; age 42    KNEE CARTILAGE SURGERY      excision lesion of meniscus or capsule knee    KNEE SURGERY      OOPHORECTOMY Bilateral     age 42    OTHER SURGICAL HISTORY      radiation therapy    RI ARTHRP KNE CONDYLE&PLATU MEDIAL&LAT COMPARTMENTS Left 08/17/2020    Procedure: ARTHROPLASTY KNEE TOTAL;  Surgeon: Melquiades Sterling DO;  Location: [facility];  Service: Orthopedics    RI ARTHRP KNE CONDYLE&PLATU MEDIAL&LAT COMPARTMENTS Right 03/28/2022    Procedure: ARTHROPLASTY KNEE TOTAL W ROBOT - RIGHT;  Surgeon: Melquiades Sterling DO;  Location: [facility];  Service: Orthopedics    RI COLONOSCOPY FLX DX W/COLLJ SPEC WHEN PFRMD N/A 02/08/2017    Procedure: COLONOSCOPY;  Surgeon: Desean Ham MD;  Location: [facility];  Service: Gastroenterology    RI ESOPHAGOGASTRODUODENOSCOPY TRANSORAL DIAGNOSTIC N/A 09/20/2017    Procedure: ESOPHAGOGASTRODUODENOSCOPY (EGD);  Surgeon: Jacob Morrell MD;  [facility]: [facility];  Service: Gastroenterology    RI RPLCMT AORTIC VALVE OPN W/STENTLESS TISSUE VALVE N/A 2/19/2024    Procedure: REPLACEMENT VALVE AORTIC (AVR) WITH  21mm INSPRIS TISSUE VALVE;  Surgeon: Liang Mccullough DO;  Location:  MAIN OR;  Service: Cardiac Surgery    UPPER GASTROINTESTINAL ENDOSCOPY             02/23/24 0922   OT Last Visit   OT Visit Date 02/23/24   Note Type   Note type Evaluation   Pain Assessment   Pain Assessment Tool  "FLACC   Pain Location/Orientation Orientation: Mid;Location: Chest;Location: Incision   Patient's Stated Pain Goal No pain   Hospital Pain Intervention(s) Repositioned;Emotional support   Pain Rating: FLACC (Rest) - Face 0   Pain Rating: FLACC (Rest) - Legs 0   Pain Rating: FLACC (Rest) - Activity 0   Pain Rating: FLACC (Rest) - Cry 0   Pain Rating: FLACC (Rest) - Consolability 0   Score: FLACC (Rest) 0   Pain Rating: FLACC (Activity) - Face 1   Pain Rating: FLACC (Activity) - Legs 0   Pain Rating: FLACC (Activity) - Activity 0   Pain Rating: FLACC (Activity) - Cry 1   Pain Rating: FLACC (Activity) - Consolability 1   Score: FLACC (Activity) 3   Restrictions/Precautions   Other Precautions Cardiac/sternal;Cognitive;Multiple lines;Telemetry;O2;Fall Risk;Pain  (A-line)   Home Living   Type of Home House   Home Layout Two level;Able to live on main level with bedroom/bathroom;Stairs to enter with rails  (3 BREEZY)   Bathroom Shower/Tub Tub/shower unit   Bathroom Toilet Raised   Bathroom Equipment Grab bars in shower   Bathroom Accessibility Accessible   Home Equipment Cane;Walker   Additional Comments Pt lives in a 2 SH with 3 BREEZY with HR, able to have a 1st fl setup, uses a tub shower with GB and raised toilet   Prior Function   Level of [place] Independent with ADLs;Independent with functional mobility;Independent with IADLS   Lives With Spouse   Receives Help From Family   IADLs Independent with driving;Independent with meal prep;Independent with medication management   Falls in the last 6 months 0   Vocational Retired   Lifestyle   Autonomy Pta pt I with ADL, IADL and fxnal mobility using cane, (+)    Reciprocal Relationships supportive spouse   Service to Others retired   Intrinsic Gratification enjoys watching TV   Subjective   Subjective \"I am not sure about this\"   ADL   Where Assessed Chair   Eating Assistance 5  Supervision/Setup   Grooming Assistance 4  Minimal Assistance   UB Bathing Assistance 3  " Moderate Assistance   LB Bathing Assistance 2  Maximal Assistance   UB Dressing Assistance 3  Moderate Assistance   LB Dressing Assistance 2  Maximal Assistance   Toileting Assistance  2  Maximal Assistance   Functional Assistance 3  Moderate Assistance   Bed Mobility   Additional Comments Pt greeted OOB in chair, left in chair with all needs within reach   Transfers   Sit to Stand 2  Maximal assistance   Additional items Assist x 2;Increased time required;Verbal cues   Stand to Sit 2  Maximal assistance   Additional items Assist x 2;Increased time required;Verbal cues   Additional Comments with RW, STS x 2   Functional Mobility   Additional Comments unable to progress   Balance   Static Sitting Fair -   Dynamic Sitting Poor +   Static Standing Poor   Dynamic Standing Poor -   Activity Tolerance   Activity Tolerance Patient limited by fatigue;Patient limited by pain   Medical Staff Made Aware Co-eval with DPT due to high medical complexity, assist from restorative Flaca   Nurse Made Aware RN cleared for therapy   RUE Assessment   RUE Assessment WFL   LUE Assessment   LUE Assessment WFL   Hand Function   Gross Motor Coordination Functional   Fine Motor Coordination Functional   Sensation   Light Touch No apparent deficits   Vision-Basic Assessment   Current Vision Wears glasses all the time   Psychosocial   Psychosocial (WDL) X   Patient Behaviors/Mood Cooperative;Anxious;Fearful;Sad   Cognition   Overall Cognitive Status Impaired   Arousal/Participation Cooperative;Lethargic   Attention Attends with cues to redirect   Orientation Level Oriented to person;Oriented to place;Oriented to situation   Memory Decreased recall of precautions;Decreased recall of recent events;Decreased recall of biographical information   Following Commands Follows one step commands with increased time or repetition   Comments Pt cooperative to therapy, a questionable historian at times, requiring vc for safety throughout transfers, with  decreased initiation.   Assessment   Limitation Decreased ADL status;Decreased Safe judgement during ADL;Decreased cognition;Decreased endurance;Decreased self-care trans;Decreased high-level ADLs   Prognosis Fair   Assessment Pt is a 83 y.o. female who was admitted to [facility] on 2/19/2024 with S/P AVR. Pt seen for an OT evaluation per active OT orders.  Pt  has a past medical history of Arthritis, Atrial fibrillation (HCC), Carrero's esophagus, BRCA1 negative, BRCA2 negative, Breast cancer (HCC), Cancer (HCC), Cataract, Chronic diastolic CHF (congestive heart failure) (HCC), Dysplasia of toenail, Esophageal reflux, GERD (gastroesophageal reflux disease), Gross hematuria, Hematuria, Hiatal hernia, History of radiation therapy, Hypertension, Irregular heart beat, Mixed sensory-motor polyneuropathy, Neuropathy, Obesity, Pacemaker, Paroxysmal atrial fibrillation (HCC), 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HCC), Peripheral neuropathy, Rectal bleeding, Restless leg syndrome, and Shortness of breath.  Pt lives with 2  with 3 BREEZY, 1st fl setup, uses a tub shower with GB and raised toilet. Pta, pt was independent w/ ADL/IADL and functional mobility, was (+) driving and was using a cane, may also have a RW. Currently, pt is Mod Ax1 for UB ADL, Max Ax1 for LB ADL, and completed transfers w Max Ax2 with RW, unable to progress to fxnal mobility. Pt currently presents with impairments in the following categories -steps to enter environment, limited home support, difficulty performing ADLS, and limited insight into deficits activity tolerance, endurance, standing balance/tolerance, sitting balance/tolerance, memory, insight, safety , judgement , and attention . These impairments, as well as pt's fatigue, SOB, LU, pain, and cardiac/sternal precautions  limit pt's ability to safely engage in all baseline areas of occupation, includinggrooming, bathing, dressing, toileting, and functional mobility/transfers.  The patient's  raw score on the AM-PAC Daily Activity Inpatient Short Form is 14. A raw score of less than 19 suggests the patient may benefit from discharge to post-acute rehabilitation services. Please refer to the recommendation of the Occupational Therapist for safe discharge planning. Pt would benefit from continued acute OT services throughout hospital course and following D/C. Plan for OT interventions 2-3x per week. From OT standpoint, recommend post acute rehab services upon D/C. Pt was left seated in bedside chair with all needs within reach.   Goals   Patient Goals to feel better   Plan   Treatment Interventions ADL retraining;Functional transfer training;Endurance training;Patient/family training;Continued evaluation;Cardiac education;Energy conservation;Activityengagement;Equipment evaluation/education   Goal Expiration Date 03/08/24   OT Frequency 2-3x/wk   Discharge Recommendation   Rehab Resource Intensity Level, OT I (Maximum Resource Intensity)   AM-PAC Daily Activity Inpatient   Lower Body Dressing 2   Bathing 2   Toileting 2   Upper Body Dressing 2   Grooming 3   Eating 3   Daily Activity Raw Score 14   Daily Activity Standardized Score (Calc for Raw Score >=11) 33.39   -PAC Applied Cognition Inpatient   Following a Speech/Presentation 3   Understanding Ordinary Conversation 3   Taking Medications 3   Remembering Where Things Are Placed or Put Away 3   Remembering List of 4-5 Errands 2   Taking Care of Complicated Tasks 2   Applied Cognition Raw Score 16   Applied Cognition Standardized Score 35.03   Modified Crosby Scale   Modified Mansi Scale 4   End of Consult   Education Provided Yes   Patient Position at End of Consult Bedside chair;All needs within reach   Nurse Communication Nurse aware of consult       OT Goals    - Pt will be Min A  with LB ADL by end of hospital course.    - Pt will be Supervision with UB ADL by end of hospital course.    - Pt will be Min A  with all functional transfers required  for patient safety by end of hospital course.    - Pt will be Min A  with functional mobility to/from bathroom for increased independence with toileting tasks.    - Pt activity tolerance will increase to 30 minutes in order to safely engage in ADL and transfers.     - Pt standing tolerance will increase to 3 minutes  in order to promote sink side ADLs and IADL activities.    - Pt will consistently follow one-step directions with min to no vc or prompting.     - Pt will attend to functional tasks for 10 minutes with min to no vc for attention/redirection.    - Pt will attend to and complete continuous cognitive assessment throughout hospital course in order to inform safe d/c planning.    - Pt will recall all cardiac precautions during OT sessions to promote safety following hospital stay.    - Pt will verbalize and demonstrate understanding to cardiac packet following education in order to promote safety following hospital stay.        MIGUEL Taylor, OTR/L

## 2024-02-24 NOTE — PROGRESS NOTES
Progress Note - Cardiothoracic Surgery   Natividad Ortiz 83 y.o. female MRN: [id number]  Unit/Bed#: [facility] 415-01 Encounter: [id number]      POD # 5 s/p Tissue AVR    Pt seen/examined.  Interval history and data reviewed with critical care team.    No vasoactive gtts.    Sitting out of bed in chair. Nutritional intake is marginal. She does have some swallowing dysfunction.      Medications:   Scheduled Meds:  Current Facility-Administered Medications   Medication Dose Route Frequency Provider Last Rate    acetaminophen  650 mg Rectal Q4H PRN Yari Peñaloza PA-C      acetaminophen  650 mg Oral Q6H While awake Yari Peñaloza PA-C      amiodarone  200 mg Oral Q8H [facility] Yari Peñaloza PA-C      ascorbic acid  500 mg Oral BID Yari Peñaloza PA-C      aspirin  325 mg Oral Daily Yari Peñaloza PA-C      atorvastatin  80 mg Oral Daily With Dinner Yari Peñaloza PA-C      bumetanide (BUMEX) 12.5 mg infusion 50 mL  4 mg/hr Intravenous Continuous Deangelo Matos PA-C 4 mg/hr (02/24/24 1031)    chlorhexidine  15 mL Mouth/Throat BID Yari Peñaloza PA-C      epoetin thomas  50 Units/kg Subcutaneous Once Uzma Garrett PA-C      ferrous sulfate  325 mg Oral Daily With Breakfast Uzma Garrett PA-C      gabapentin  200 mg Oral TID Uzma Garrett PA-C      heparin (porcine)  5,000 Units Subcutaneous Q8H [facility] Bernice Mclain PA-C      insulin lispro  1-5 Units Subcutaneous TID AC Iliana Mckeon PA-C      insulin lispro  1-5 Units Subcutaneous HS Iliana Mckeon PA-C      levalbuterol  1.25 mg Nebulization Q6H Liang Mccullough DO      metoprolol tartrate  50 mg Oral Q12H [facility] Iliana Mckeon PA-C      ondansetron  4 mg Intravenous Q6H PRN Yari Peñaloza PA-C      oxyCODONE  2.5 mg Oral Q4H PRN Yari Peñaloza PA-C      Or    oxyCODONE  5 mg Oral Q4H PRN Yari Peñaloza PA-C      pantoprazole  40 mg Oral Early Morning Yari Peñaloza PA-C      polyethylene  "glycol  17 g Oral Daily Yari Peñaloza PA-C      pramipexole  1 mg Oral HS Yari Peñaloza PA-C      sodium chloride  4 mL Nebulization Q6H Liang Mccullough DO      warfarin  2.5 mg Oral Once (warfarin) Uzma Garrett PA-C       Continuous Infusions:bumetanide (BUMEX) 12.5 mg infusion 50 mL, 4 mg/hr, Last Rate: 4 mg/hr (02/24/24 1031)      PRN Meds:.  acetaminophen    ondansetron    oxyCODONE **OR** oxyCODONE    Vitals: Blood pressure 113/56, pulse 77, temperature 97.8 °F (36.6 °C), temperature source Oral, resp. rate (!) 30, height 5' 4\" (1.626 m), weight 107 kg (236 lb 1.8 oz), SpO2 93%.,Body mass index is 40.53 kg/m².  I/O last 24 hours:  In: 825.8 [P.O.:360; I.V.:365.8; IV Piggyback:100]  Out: 1930 [Urine:1930]  Invasive Devices       Central Venous Catheter Line  Duration             CVC Central Lines 02/19/24 5 days              Drain  Duration             Urethral Catheter Non-latex;Temperature probe 16 Fr. 5 days                      Lab, Imaging and other studies:   Results from last 7 days   Lab Units 02/24/24  0404 02/23/24  0401 02/22/24  0403   WBC Thousand/uL 11.51* 15.76* 17.65*   HEMOGLOBIN g/dL 7.6* 7.9* 7.2*   HEMATOCRIT % 24.8* 26.4* 24.1*   PLATELETS Thousands/uL 134* 138* 111*     Results from last 7 days   Lab Units 02/24/24  0405 02/23/24  2157 02/23/24  1621 02/19/24  1551 02/19/24  1133   POTASSIUM mmol/L 3.7 3.9 4.1   < >  --    CHLORIDE mmol/L 99 101 101   < >  --    CO2 mmol/L 30 27 29   < >  --    CO2, I-STAT mmol/L  --   --   --   --  30   BUN mg/dL 73* 70* 65*   < >  --    CREATININE mg/dL 2.72* 2.68* 2.46*   < >  --    GLUCOSE, ISTAT mg/dl  --   --   --   --  169*   CALCIUM mg/dL 7.9* 8.2* 8.3*   < >  --     < > = values in this interval not displayed.     Results from last 7 days   Lab Units 02/24/24  0404 02/23/24  0401 02/22/24  0617 02/21/24  0947 02/19/24  1652 02/19/24  1322 02/19/24  1233   INR  1.83* 1.86* 1.93*   < > 0.96 1.72* 2.67*   PTT seconds  --   -- "   --   --  30 34 43*    < > = values in this interval not displayed.     Recent Labs     02/23/24  0622   PHART 7.414   KGM4NFR 29.5*   PO2ART 93.6   MCL9HAD 47.1*   BEART 4.3           Plan:  Collaborative rounds performed at bedside with critical care team.  I agree with their assessment and plan.      Nutritional support. May need feeding tube if she can't take enough PO.  Iron/Vit C/Epogen for anemia.      SIGNATURE: DESHAWN Sears MD  DATE: February 24, 2024  TIME: 10:48 AM

## 2024-02-24 NOTE — PROGRESS NOTES
[facility]  Progress Note: Critical Care   Date of Service: 2/24/2024  Hospital Day: 5  Name: Natividad Ortiz I  MRN: [id number]  [facility]      Assessment/Plan     Impressions:  Severe AS s/p aortic valve replacement with pericardial tissue valve  ABLA  Acute respiratory insufficiency  Paroxysmal atrial fibrillation on chronic anticoagulation  Sick sinus syndrome s/p PPM  Chronic diastolic CHF  GERD  Carrero's esophagus  CKD 3  Lung nodules  Venous insufficiency  Left breast CA s/p lumpectomy/radiation  CHAYO    Neuro:   Cardiac Surgery Pain Control: ATC tylenol and PRN oxycodone 2.5/5mg  Delirium precautions  Regulate sleep/wake cycle  Restoril 15mg qhs prn  CAM-ICU daily  Trend neuro exam      CV:   Cardiac Surgery Hemodynamic Monitoring: MAP goal >65  Follow rhythm on telemetry  Critical Care Infusions : No cardiac infusions  Beta Blocker Plan: Continue current lopressor dosage 50mg BID  Epicardial pacing wire plan : Epicardial wires not in place  Post op cardiac surgery medications:    mg QD  Statin: Lipitor 80 mg qHS  Amiodarone 200 mg PO q8 hours   Coumadin dosing. Daily INR    Lung:   Acute post-op pulmonary insufficiency requiring Bipap, 4L NC. Secondary to poor inspiratory effort secondary to pain, pulmonary vascular congestion, and atelectasis  Chest tubes have been discontinued  Continue qhs bipap. IS, flutter valve.     GI:   Continue PPI for stress ulcer prophylaxis  Continue bowel regimen  Trend abdominal exam and bowel function    FEN:   Diuresis Plan: Bumex infusion 4 mg/hr.  Check Q6 BMPs  Redose zaroxolyn  Nutrition plan: Dysphagia diet per speech  Replenish electrolytes with goals: K >4.0, Mag >2.0, and Phos >3.0    :   Ledesma Plan: Continue for accurate I/O monitoring for 48 hours  Trend UOP and BUN/creat  Strict I and O     ID:   Trend temps and WBC count  Maintain normothermia    Heme:   Trend hgb and plts    Endo:    SSI      MSK/Skin:  Mobility goal:   PT/OT consult as medically appropriate   Frequent turning and pressure off-loading  Local wound care as needed    Disposition:  Critical care    ICU Core Measures     A: Assess, Prevent, and Manage Pain Has pain been assessed? Yes  Need for changes to pain regimen? No   B: Both SAT/SAT  N/A   C: Choice of Sedation RASS Goal: N/A patient not on sedation  Need for changes to sedation or analgesia regimen? NA   D: Delirium CAM-ICU: Negative   E: Early Mobility  Plan for early mobility? Yes   F: Family Engagement Plan for family engagement today? Yes       Review of Invasive Devices:    Ledesma Plan: Continue for accurate I/O monitoring for 48 hours  Central access plan: Medications requiring central line      Prophylaxis:  VTE VTE covered by:  heparin (porcine), Subcutaneous, 5,000 Units at 02/24/24 0546       Stress Ulcer  covered byfamotidine (PEPCID) 40 MG tablet [[id number]] (Long-Term Med), pantoprazole (PROTONIX) 20 mg tablet [[id number]] (Long-Term Med), pantoprazole (PROTONIX) EC tablet 40 mg [[id number]]          Significant 24hr Events      24 Hour Events/HPI: POD # 5 s/p tissue AVR. Weaned to 4L NC during the day, wore Bipap overnight. Bumex gtt increased to 4mg/hr overnight. Received zaroxolyn 10mg x 1 yesterday. Dosed with coumadin.   Critical Care Infusions : No cardiac infusions   Subjective   Review of Systems   Musculoskeletal:         L leg pain        Objective     Medications:  Scheduled Continuous   acetaminophen, 650 mg, Q6H While awake  amiodarone, 200 mg, Q8H ANTON  ascorbic acid, 500 mg, BID  aspirin, 325 mg, Daily  atorvastatin, 80 mg, Daily With Dinner  chlorhexidine, 15 mL, BID  gabapentin, 800 mg, TID  heparin (porcine), 5,000 Units, Q8H ANTON  insulin lispro, 1-5 Units, TID AC  insulin lispro, 1-5 Units, HS  levalbuterol, 1.25 mg, Q6H  metoprolol tartrate, 50 mg, Q12H ANTON  mupirocin, 1 Application, Q12H ANTON  pantoprazole, 40 mg, Early Morning  polyethylene  glycol, 17 g, Daily  pramipexole, 1 mg, HS  sodium chloride, 4 mL, Q6H      bumetanide (BUMEX) 12.5 mg infusion 50 mL, 4 mg/hr, Last Rate: 4 mg/hr (02/24/24 0343)         Vitals: Invasive Monitoring       Most Recent Min/Max in 24hrs   Temp 97.8 °F (36.6 °C) Temp  Min: 97.8 °F (36.6 °C)  Max: 98.1 °F (36.7 °C)   Pulse 77 Pulse  Min: 70  Max: 130   Resp (!) 30 Resp  Min: 16  Max: 31   /66 BP  Min: 88/49  Max: 138/61   O2 Sat 93 % SpO2  Min: 92 %  Max: 100 %   O2 Device: Nasal cannula  Nasal Cannula O2 Flow Rate (L/min): 2 L/min    I/O Chest tube output (if present)     Intake/Output Summary (Last 24 hours) at 2/24/2024 0645  Last data filed at 2/24/2024 0600  Gross per 24 hour   Intake 825.79 ml   Output 1930 ml   Net -1104.21 ml     UOP: 75cc/hour    BM: 0 in the last 24 hours  Weight change: -0.2 kg (-7.1 oz)          Physical Exam   Physical Exam  Vitals and nursing note reviewed.   Eyes:      Pupils: Pupils are equal, round, and reactive to light.   Skin:     General: Skin is warm and dry.   HENT:      Head: Normocephalic and atraumatic.   Cardiovascular:      Rate and Rhythm: Normal rate. Rhythm irregularly irregular.      Heart sounds: Normal heart sounds.   Musculoskeletal:      Right lower leg: Edema present.      Left lower leg: Edema present.   Abdominal: General: There is no distension.      Palpations: Abdomen is soft.      Tenderness: There is no abdominal tenderness.   Constitutional:       General: She is not in acute distress.     Appearance: She is morbidly obese. She is ill-appearing.      Interventions: Nasal cannula in place.   Pulmonary:      Effort: Tachypnea present.      Breath sounds: Decreased breath sounds present. No wheezing, rhonchi or rales.   Psychiatric:         Behavior: Behavior is cooperative.   Neurological:      General: No focal deficit present.      Mental Status: She is alert and oriented to person, place, and time.          Diagnostic Studies      02/24/24 CXR:  Pulmonary vascular congestion. No obvious ptx.  This was personally reviewed by myself in PACS.           Labs:  CBC    Recent Labs     02/23/24  0401 02/24/24  0404   WBC 15.76* 11.51*   HGB 7.9* 7.6*   HCT 26.4* 24.8*   * 134*     BMP    Recent Labs     02/23/24 2157 02/24/24  0405   SODIUM 138 137   K 3.9 3.7    99   CO2 27 30   AGAP 10 8   BUN 70* 73*   CREATININE 2.68* 2.72*   CALCIUM 8.2* 7.9*        Baseline Creat: 0.8-1.1   Coags    Recent Labs     02/23/24  0401 02/24/24  0404   INR 1.86* 1.83*              Additional Electrolytes  Recent Labs     02/23/24  0401 02/23/24  0407 02/24/24  0404   MG  --  2.4 2.5   PHOS  --  3.7  --    CAIONIZED 1.12  --   --           Blood Gas    Recent Labs     02/23/24  0622   PHART 7.414   [id number] 47.1*   PO2ART 93.6   VSO2FHV 29.5*   BEART 4.3   SOURCE Line, Arterial     Recent Labs     02/22/24  2356 02/23/24  0137 02/23/24  0622   PHVEN 7.336  --   --    MMJ1GJT 53.3*  --   --    PO2VEN 36.0  --   --    UIL0LIP 27.9  --   --    BEVEN 1.5  --   --    U2ZQNYK 66.4  --   --    SOURCE  --    < > Line, Arterial    < > = values in this interval not displayed.    LFTs  No recent results    Infectious    Recent Labs     02/23/24 0407   PROCALCITONI 2.39*        No recent results Glucose  Recent Labs     02/23/24  0950 02/23/24  1621 02/23/24  2157 02/24/24  0405   GLUC 113 120 133 100        Collaborative bedside rounds performed with cardiac surgery attending, critical care attending and bedside RN.     Uzma Garrett PA-C

## 2024-02-25 NOTE — PROGRESS NOTES
Progress Note - Cardiothoracic Surgery   Natividad Ortiz 83 y.o. female MRN: [id number]  Unit/Bed#: [facility] 415-01 Encounter: [id number]      POD # 6 s/p Tissue AVR    Pt seen/examined.  Interval history and data reviewed with critical care team.    Looks a little better today. Had better PO intake yesterday. Hgb is increasing.      Medications:   Scheduled Meds:  Current Facility-Administered Medications   Medication Dose Route Frequency Provider Last Rate    acetaminophen  650 mg Rectal Q4H PRN Yari Peñaloza PA-C      acetaminophen  650 mg Oral Q6H While awake Yari Peñaloza PA-C      amiodarone  200 mg Oral Q8H [facility] Yari Peñaloza PA-C      ascorbic acid  500 mg Oral BID Yari Peñaloza PA-C      aspirin  81 mg Oral Daily Uzma Garrett PA-C      atorvastatin  80 mg Oral Daily With Dinner Yari Peñaloza PA-C      bisacodyl  10 mg Rectal Daily PRN Deangelo Matos PA-C      bumetanide (BUMEX) 12.5 mg infusion 50 mL  1 mg/hr Intravenous Continuous Uzma Garrett PA-C 1 mg/hr (02/25/24 0623)    chlorhexidine  15 mL Mouth/Throat BID Yari Peñaloza PA-C      ferrous sulfate  325 mg Oral Daily With Breakfast Uzma Garrett PA-C      gabapentin  200 mg Oral TID Uzma Garrett PA-C      heparin (porcine)  5,000 Units Subcutaneous Q8H [facility] Bernice Mclain PA-C      insulin lispro  1-5 Units Subcutaneous TID AC Iliana Mckeon PA-C      insulin lispro  1-5 Units Subcutaneous HS Iliana Mckeon PA-C      levalbuterol  1.25 mg Nebulization BID Liang Mccullough DO      metoprolol tartrate  50 mg Oral Q12H [facility] Iliana Mckeon PA-C      ondansetron  4 mg Intravenous Q6H PRN Yari Peñaloza PA-C      oxyCODONE  2.5 mg Oral Q4H PRN Yari Peñaloza PA-C      Or    oxyCODONE  5 mg Oral Q4H PRN Yari Peñaloza PA-C      pantoprazole  40 mg Oral Early Morning Yari Peñaloza PA-C      polyethylene glycol  17 g Oral Daily Yari Peñaloza PA-C       "pramipexole  1 mg Oral HS Yari Peñaloza PA-C      senna-docusate sodium  1 tablet Oral BID Deangelo Matos PA-C      sodium chloride  4 mL Nebulization BID Liang Mccullough DO      temazepam  15 mg Oral HS PRN Uzma Garrett PA-C      warfarin  5 mg Oral Once (warfarin) Uzma Garrett PA-C       Continuous Infusions:bumetanide (BUMEX) 12.5 mg infusion 50 mL, 1 mg/hr, Last Rate: 1 mg/hr (02/25/24 0623)      PRN Meds:.  acetaminophen    bisacodyl    ondansetron    oxyCODONE **OR** oxyCODONE    temazepam    Vitals: Blood pressure 136/64, pulse 72, temperature 98.5 °F (36.9 °C), resp. rate (!) 33, height 5' 4\" (1.626 m), weight 107 kg (236 lb 8.9 oz), SpO2 92%.,Body mass index is 40.6 kg/m².  I/O last 24 hours:  In: 1308 [P.O.:1020; I.V.:188; IV Piggyback:100]  Out: 4500 [Urine:4500]  Invasive Devices       Central Venous Catheter Line  Duration             CVC Central Lines 02/19/24 6 days              Drain  Duration             Urethral Catheter Non-latex;Temperature probe 16 Fr. 6 days                      Lab, Imaging and other studies:   Results from last 7 days   Lab Units 02/25/24  0317 02/24/24  0404 02/23/24  0401   WBC Thousand/uL 9.87 11.51* 15.76*   HEMOGLOBIN g/dL 8.1* 7.6* 7.9*   HEMATOCRIT % 26.7* 24.8* 26.4*   PLATELETS Thousands/uL 169 134* 138*     Results from last 7 days   Lab Units 02/25/24  1000 02/25/24  0317 02/24/24  2142 02/19/24  1551 02/19/24  1133   POTASSIUM mmol/L 4.3 3.7 3.9   < >  --    CHLORIDE mmol/L 93* 94* 94*   < >  --    CO2 mmol/L 33* 31 30   < >  --    CO2, I-STAT mmol/L  --   --   --   --  30   BUN mg/dL 82* 79* 80*   < >  --    CREATININE mg/dL 2.20* 2.17* 2.41*   < >  --    GLUCOSE, ISTAT mg/dl  --   --   --   --  169*   CALCIUM mg/dL 8.2* 8.1* 7.9*   < >  --     < > = values in this interval not displayed.     Results from last 7 days   Lab Units 02/25/24  0409 02/24/24  0404 02/23/24  0401 02/21/24  0947 02/19/24  1652 02/19/24  1322 " 02/19/24  1233   INR  1.71* 1.83* 1.86*   < > 0.96 1.72* 2.67*   PTT seconds  --   --   --   --  30 34 43*    < > = values in this interval not displayed.     Recent Labs     02/23/24  0622   PHART 7.414   PZK2ETP 29.5*   PO2ART 93.6   DFY2PVS 47.1*   BEART 4.3           Plan:  Collaborative rounds performed at bedside with critical care team.  I agree with their assessment and plan.      Continue supportive care.  Transfer to stepdown.  Likely intermittent diuretic dosing tomorrow.      SIGNATURE: DESHAWN Sears MD  DATE: February 25, 2024  TIME: 10:43 AM

## 2024-02-25 NOTE — PROGRESS NOTES
[facility]  Progress Note: Critical Care   Date of Service: 2/25/2024  Hospital Day: 6  Name: Natividad Ortiz I  MRN: [id number]  [facility]      Assessment/Plan     Impressions:  Severe AS s/p aortic valve replacement with pericardial tissue valve  ABLA  Acute respiratory insufficiency  Paroxysmal atrial fibrillation on chronic anticoagulation  Sick sinus syndrome s/p PPM  Chronic diastolic CHF  GERD  Carrero's esophagus  CKD 3  Lung nodules  Venous insufficiency  Left breast CA s/p lumpectomy/radiation  CHAYO    Neuro:   Cardiac Surgery Pain Control: ATC tylenol and PRN oxycodone 2.5/5mg  Delirium precautions  Regulate sleep/wake cycle  CAM-ICU daily  Trend neuro exam      CV:   Cardiac Surgery Hemodynamic Monitoring: MAP goal >65  Follow rhythm on telemetry  Critical Care Infusions : No cardiac infusions  Beta Blocker Plan: Continue current lopressor dosage - 50mg BID  Epicardial pacing wire plan : Epicardial wires not in place  Post op cardiac surgery medications:   ASA 81 mg QD  Statin: Lipitor 80 mg qHS  Amiodarone 200 mg PO q8 hours   Coumadin dosing - 5mg tonight, daily INR    Lung:   Acute post-op pulmonary insufficiency requiring 4 liters/min via nasal cannula. Secondary to pulmonary vascular congestion and body habitus/obesity  Chest tubes have been discontinued    GI:   Continue PPI for stress ulcer prophylaxis  Continue bowel regimen  Trend abdominal exam and bowel function  Increase bowel regimen    FEN:   Diuresis Plan: Bumex infusion 1 mg/hr.  Check Q6 BMPs  Goal negative fluid balance, likely transition to bolus dose bumex tomorrow  Nutrition plan: Dysphagia diet per speech  Replenish electrolytes with goals: K >4.0, Mag >2.0, and Phos >3.0    :   Ledesma Plan: Continue for accurate I/O monitoring for 48 hours  Trend UOP and BUN/creat  Strict I and O     ID:   Trend temps and WBC count  Maintain normothermia    Heme:   Trend hgb and plts    Endo:    SSI    MSK/Skin:  Mobility goal:   PT/OT consult as medically appropriate   Frequent turning and pressure off-loading  Local wound care as needed    Disposition:  Stepdown Level 2    ICU Core Measures     A: Assess, Prevent, and Manage Pain Has pain been assessed? Yes  Need for changes to pain regimen? No   B: Both SAT/SAT  N/A   C: Choice of Sedation RASS Goal: N/A patient not on sedation  Need for changes to sedation or analgesia regimen? NA   D: Delirium CAM-ICU: Negative   E: Early Mobility  Plan for early mobility? Yes   F: Family Engagement Plan for family engagement today? Yes       Review of Invasive Devices:    Ledesma Plan: Continue for accurate I/O monitoring for 48 hours  Central access plan: Hemodynamic monitoring      Prophylaxis:  VTE VTE covered by:  heparin (porcine), Subcutaneous, 5,000 Units at 02/25/24 0509  warfarin, Oral       Stress Ulcer  covered byfamotidine (PEPCID) 40 MG tablet [[id number]] (Long-Term Med), pantoprazole (PROTONIX) 20 mg tablet [[id number]] (Long-Term Med), pantoprazole (PROTONIX) EC tablet 40 mg [[id number]]          Significant 24hr Events      24 Hour Events/HPI: POD # 6 s/p tissue AVR. Bumex gtt decreased to 2mg/hr, -2.5L/24 hours. Drip decreased to 1mg/hr this AM. Dosed with coumadin 2.5mg last night.     Critical Care Infusions : No cardiac infusions   Subjective   Review of Systems     Objective     Medications:  Scheduled Continuous   acetaminophen, 650 mg, Q6H While awake  amiodarone, 200 mg, Q8H ANTON  ascorbic acid, 500 mg, BID  aspirin, 81 mg, Daily  atorvastatin, 80 mg, Daily With Dinner  chlorhexidine, 15 mL, BID  ferrous sulfate, 325 mg, Daily With Breakfast  gabapentin, 200 mg, TID  heparin (porcine), 5,000 Units, Q8H ANTON  insulin lispro, 1-5 Units, TID AC  insulin lispro, 1-5 Units, HS  levalbuterol, 1.25 mg, BID  metoprolol tartrate, 50 mg, Q12H ANTON  pantoprazole, 40 mg, Early Morning  polyethylene glycol, 17 g, Daily  potassium chloride, 40 mEq,  Once  pramipexole, 1 mg, HS  senna-docusate sodium, 1 tablet, BID  sodium chloride, 4 mL, BID  warfarin, 5 mg, Once (warfarin)      bumetanide (BUMEX) 12.5 mg infusion 50 mL, 1 mg/hr, Last Rate: 1 mg/hr (02/25/24 0623)         Vitals: Invasive Monitoring       Most Recent Min/Max in 24hrs   Temp 98.5 °F (36.9 °C) Temp  Min: 97.5 °F (36.4 °C)  Max: 99 °F (37.2 °C)   Pulse 75 Pulse  Min: 71  Max: 104   Resp (!) 33 Resp  Min: 22  Max: 34   /81 BP  Min: 92/54  Max: 126/56   O2 Sat 92 % SpO2  Min: 92 %  Max: 100 %   O2 Device: Nasal cannula  Nasal Cannula O2 Flow Rate (L/min): 4 L/min    I/O Chest tube output (if present)     Intake/Output Summary (Last 24 hours) at 2/25/2024 0623  Last data filed at 2/25/2024 0501  Gross per 24 hour   Intake 1308 ml   Output 3800 ml   Net -2492 ml     UOP: 150cc/hour    BM: 0 in the last 24 hours  Weight change: 0.2 kg (7.1 oz)          Physical Exam   Physical Exam  Vitals and nursing note reviewed.   Eyes:      Pupils: Pupils are equal, round, and reactive to light.   Skin:     General: Skin is warm and dry.   HENT:      Head: Normocephalic and atraumatic.   Cardiovascular:      Rate and Rhythm: Normal rate and regular rhythm.      Heart sounds: Normal heart sounds.   Musculoskeletal:      Right lower leg: Edema present.      Left lower leg: Edema present.   Abdominal: General: There is no distension.      Palpations: Abdomen is soft.   Constitutional:       General: She is not in acute distress.     Appearance: She is obese. She is not ill-appearing.      Interventions: Nasal cannula in place.   Pulmonary:      Effort: No tachypnea.      Breath sounds: Examination of the right-lower field reveals decreased breath sounds. Examination of the left-lower field reveals decreased breath sounds. Decreased breath sounds present.   Psychiatric:         Behavior: Behavior is cooperative.   Neurological:      General: No focal deficit present.      Mental Status: She is alert and oriented  to person, place, and time.          Diagnostic Studies      02/25/24 No new imaging         Labs:  CBC    Recent Labs     02/24/24  0404 02/25/24  0317   WBC 11.51* 9.87   HGB 7.6* 8.1*   HCT 24.8* 26.7*   * 169     BMP    Recent Labs     02/24/24  2142 02/25/24  0317   SODIUM 135 137   K 3.9 3.7   CL 94* 94*   CO2 30 31   AGAP 11 12   BUN 80* 79*   CREATININE 2.41* 2.17*   CALCIUM 7.9* 8.1*        Baseline Creat: 0.8-1.1   Coags    Recent Labs     02/24/24  0404 02/25/24  0409   INR 1.83* 1.71*              Additional Electrolytes  Recent Labs     02/24/24  0404 02/25/24  0317   MG 2.5 2.4          Blood Gas    No recent results    No recent results   LFTs  No recent results    Infectious    No recent results     No recent results Glucose  Recent Labs     02/24/24  1008 02/24/24  1725 02/24/24  2142 02/25/24  0317   GLUC 107 128 175* 125        Collaborative bedside rounds performed with cardiac surgery attending, critical care attending and bedside RN.     Uzma Garrett PA-C

## 2024-02-26 NOTE — PLAN OF CARE
Problem: PHYSICAL THERAPY ADULT  Goal: Performs mobility at highest level of function for planned discharge setting.  See evaluation for individualized goals.  Description: Treatment/Interventions: Functional transfer training, LE strengthening/ROM, Therapeutic exercise, Endurance training, Bed mobility, Gait training, Spoke to nursing, OT          See flowsheet documentation for full assessment, interventions and recommendations.  Outcome: Progressing  Note: Prognosis: Fair  Problem List: Decreased strength, Decreased endurance, Impaired balance, Decreased mobility, Obesity  Assessment: Pt demonstrated min improvement in mobility skills progressing to mod (A)x2 w/ transfers trials at bedside; pt still exhibits overall mod weakness and deconditioning w/ decreased standing balance/tolerance and inability to progress to amb at this time; overall, cont to recommend rehab upon D/C; will follow  Barriers to Discharge: Inaccessible home environment     Rehab Resource Intensity Level, PT: I (Maximum Resource Intensity)    See flowsheet documentation for full assessment.

## 2024-02-26 NOTE — OCCUPATIONAL THERAPY NOTE
Occupational Therapy Progress Note     Patient Name: Natividad Ortiz  Today's Date: 2/26/2024  Problem List  Principal Problem:    S/P AVR  Active Problems:    Hiatal hernia    Atrial fibrillation (HCC) [I48.91]    Acquired deformity of foot    Diabetic polyneuropathy associated with type 2 diabetes mellitus (HCC)    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HCC)    RLS (restless legs syndrome)    Essential hypertension    Multiple lung nodules    Severe obesity (BMI 35.0-39.9) with comorbidity ([facility])    GERD (gastroesophageal reflux disease)    Chronic edema    Deep venous insufficiency    Stage 3a chronic kidney disease (HCC)    Chronic diastolic CHF (congestive heart failure) (HCC)    Aortic stenosis, severe            02/26/24 0914   OT Last Visit   OT Visit Date 02/26/24   Note Type   Note Type Treatment   Pain Assessment   Pain Assessment Tool 0-10   Pain Score 2   Pain Location/Orientation Orientation: Mid;Location: Chest;Location: Incision   Patient's Stated Pain Goal No pain   Hospital Pain Intervention(s) Repositioned   Restrictions/Precautions   Weight Bearing Precautions Per Order No   Other Precautions Cardiac/sternal;Chair Alarm;Hard of hearing;Multiple lines;Telemetry;O2;Fall Risk;Pain   Lifestyle   Autonomy Pta pt I with ADL, IADL and fxnal mobility using cane, (+)    Reciprocal Relationships supportive spouse   Service to Others retired   Intrinsic Gratification enjoys watching TV   ADL   Where Assessed Chair   UB Bathing Assistance 3  Moderate Assistance   UB Bathing Deficit Setup;Increased time to complete;Chest;Right arm;Left arm;Abdomen   UB Bathing Comments Performs when seated in chair, assist for safety around telemtry/lines, assist for proximal UE   LB Bathing Assistance 2  Maximal Assistance   LB Bathing Deficit Setup;Buttocks;Left upper leg;Right lower leg including foot;Left lower leg including foot   LB Bathing Comments Pt washes R upper thigh after setup, requiring assist for LLE, distal  RLE and buttocks   UB Dressing Assistance 4  Minimal Assistance   UB Dressing Deficit Thread RUE;Thread LUE;Setup;Verbal cueing;Pull around back   UB Dressing Comments Pt requiring MIN A to doff/don gown   Transfers   Sit to Stand 3  Moderate assistance   Additional items Assist x 2;Increased time required;Verbal cues   Stand to Sit 3  Moderate assistance   Additional items Assist x 2;Increased time required;Verbal cues   Additional Comments with RW, STS x 2, marches in place during one trial although unable to progress to fxnal mobility   Cognition   Overall Cognitive Status WFL   Arousal/Participation Alert;Cooperative   Attention Attends with cues to redirect   Orientation Level Oriented to person;Oriented to place;Oriented to situation   Memory Decreased recall of precautions;Decreased recall of recent events   Following Commands Follows one step commands with increased time or repetition   Comments Pt cooperative to therapy although limited by [place]   Additional Activities   Additional Activities Other (Comment)  (Recovering from Cardiac Surgery education packet)   Additional Activities Comments Pt provided s/p cardiac sx education packet, reviewed w/ OT, discussed cardiac/sternal precautions, lifestyle modifications, post hospitalization IADL management, environmental temperature control, emotional regulation and management, lifting/driving restrictions, energy conservation techniques, mobility schedule, incisional management, VNA, and cardiac rehabilitation initiation upon clearance per physician at follow up. Pt reviewed education packet and acknowledged reception of such.   Activity Tolerance   Activity Tolerance Patient limited by fatigue;Patient limited by pain   Medical Staff Made Aware Co-tx with DPT due to high medical complexity, assist from rehab aid NAJMA Beth cleared   Assessment   Assessment Pt seen for OT treatment session day 1 on this date focused on ADL retraining, functional transfers, energy  conservation, functional endurance, recall of safety precautions, and patient education. Pt was greeted in chair and was cooperative throughout session. Following session, pt was left in chair with chair alarm on and all needs within reach, PT present. Pt continues to be limited by functional status related to ADLs and transfers requiring MOD A for UB bathing, MAX A for LB bathing, MIN A for UB dressing. The patient's raw score on the AM-PAC Daily Activity Inpatient Short Form is 16. A raw score of less than 19 suggests the patient may benefit from discharge to post-acute rehabilitation services. Please refer to the recommendation of the Occupational Therapist for safe discharge planning. Pt would benefit from continued acute OT intervention with plan to continue OT treatment sessions 2-3x per week. Recommend d/c to post acute rehab services.   Plan   Treatment Interventions ADL retraining;Functional transfer training;Endurance training;Cardiac education;Continued evaluation;Energy conservation   Goal Expiration Date 03/08/24   OT Treatment Day 1   OT Frequency 2-3x/wk   Discharge Recommendation   Rehab Resource Intensity Level, OT I (Maximum Resource Intensity)   AM-PAC Daily Activity Inpatient   Lower Body Dressing 2   Bathing 2   Toileting 2   Upper Body Dressing 3   Grooming 3   Eating 4   Daily Activity Raw Score 16   Daily Activity Standardized Score (Calc for Raw Score >=11) 35.96   AM-PAC Applied Cognition Inpatient   Following a Speech/Presentation 3   Understanding Ordinary Conversation 4   Taking Medications 3   Remembering Where Things Are Placed or Put Away 3   Remembering List of 4-5 Errands 3   Taking Care of Complicated Tasks 3   Applied Cognition Raw Score 19   Applied Cognition Standardized Score 39.77   Modified Mansi Scale   Modified Toole Scale 4   End of Consult   Education Provided Yes   Patient Position at End of Consult Bedside chair;All needs within reach;Bed/Chair alarm activated   Nurse  Communication Nurse aware of consult       Dee Russell, OTD, OTR/L

## 2024-02-26 NOTE — PROGRESS NOTES
Progress Note - Cardiothoracic Surgery   Natividad Ortiz 83 y.o. female MRN: [id number]  Unit/Bed#: [facility] 404-01 Encounter: [id number]    Aortic stenosis, Non-Rheumatic. S/P aortic valve replacement; POD # 7      24 Hour Events: Doing well. Tired. Workign with PT, up out of bed and marched in place. Ok appetite. No BM yet. D/c noble    Medications:   Scheduled Meds:  Current Facility-Administered Medications   Medication Dose Route Frequency Provider Last Rate    acetaminophen  650 mg Rectal Q4H PRN Uzma Garrett PA-C      acetaminophen  650 mg Oral Q6H While awake Uzma Garrett PA-C      amiodarone  200 mg Oral Q8H [facility] Uzma Garrett PA-C      aspirin  81 mg Oral Daily Uzma Garrett PA-C      atorvastatin  80 mg Oral Daily With Dinner Uzma Garrett PA-C      bisacodyl  10 mg Rectal Daily PRN Uzma Garrett PA-C      bumetanide (BUMEX) 12.5 mg infusion 50 mL  1 mg/hr Intravenous Continuous Uzma Garrett PA-C 1 mg/hr (02/25/24 4023)    ferrous sulfate  325 mg Oral Daily With Breakfast Uzma Garrett PA-C      gabapentin  200 mg Oral TID Uzma Garrett PA-C      heparin (porcine)  5,000 Units Subcutaneous Q8H [facility] Uzma Garrett PA-C      insulin lispro  1-5 Units Subcutaneous TID AC Uzma Garrett PA-C      insulin lispro  1-5 Units Subcutaneous HS Uzma Garrett PA-C      levalbuterol  1.25 mg Nebulization BID Uzma Garrett PA-C      metoprolol tartrate  50 mg Oral Q12H [facility] Uzma Garrett PA-C      ondansetron  4 mg Intravenous Q6H PRN Uzma Garrett PA-C      oxyCODONE  2.5 mg Oral Q4H PRN Uzma Garrett PA-C      Or    oxyCODONE  5 mg Oral Q4H PRN Uzma Garrett PA-C      pantoprazole  40 mg Oral Early Morning Uzma Garrett PA-C      polyethylene glycol  17 g Oral Daily Uzma Garrett PA-C      pramipexole  1 mg Oral HS Uzma Avina  BARB Garrett      senna-docusate sodium  1 tablet Oral BID Uzma Garrett PA-C      sodium chloride  4 mL Nebulization BID Uzma Garrett PA-C      temazepam  15 mg Oral HS PRN Uzma Garrett PA-C       Continuous Infusions:bumetanide (BUMEX) 12.5 mg infusion 50 mL, 1 mg/hr, Last Rate: 1 mg/hr (02/25/24 2247)      PRN Meds:.  acetaminophen    bisacodyl    ondansetron    oxyCODONE **OR** oxyCODONE    temazepam    Vitals:   Vitals:    02/26/24 0000 02/26/24 0200 02/26/24 0400 02/26/24 0535   BP: 127/60 113/74 96/50    BP Location: Right arm  Right arm    Pulse: 77 74 77    Resp: (!) 26 (!) 41 22    Temp: 97.9 °F (36.6 °C)  98 °F (36.7 °C)    TempSrc: Oral  Oral    SpO2: 96% 94% 94%    Weight:    105 kg (231 lb 14.8 oz)   Height:           Telemetry: Atrial Fibrillation; Heart Rate: 70s    Respiratory:   SpO2: SpO2: 93 %; 4 LPM    Intake/Output:     Intake/Output Summary (Last 24 hours) at 2/26/2024 0739  Last data filed at 2/26/2024 0400  Gross per 24 hour   Intake 1005.54 ml   Output 3000 ml   Net -1994.46 ml    UO 3L    Chest tube Output:  removed    Weights:   Weight (last 2 days)       Date/Time Weight    02/26/24 0535 105 (231.92)    02/25/24 0538 107 (236.55)    02/24/24 0546 107 (236.11)              Results:   Results from last 7 days   Lab Units 02/26/24  0351 02/25/24  0317 02/24/24  0404   WBC Thousand/uL 12.87* 9.87 11.51*   HEMOGLOBIN g/dL 8.0* 8.1* 7.6*   HEMATOCRIT % 26.0* 26.7* 24.8*   PLATELETS Thousands/uL 220 169 134*     Results from last 7 days   Lab Units 02/26/24  0351 02/25/24  2142 02/25/24  1437 02/19/24  1551 02/19/24  1133   SODIUM mmol/L 135 138 137   < >  --    POTASSIUM mmol/L 3.8 3.7 4.2   < >  --    CHLORIDE mmol/L 89* 91* 92*   < >  --    CO2 mmol/L 35* 34* 32   < >  --    CO2, I-STAT mmol/L  --   --   --   --  30   BUN mg/dL 85* 86* 85*   < >  --    CREATININE mg/dL 2.21* 2.26* 2.19*   < >  --    GLUCOSE, ISTAT mg/dl  --   --   --   --  169*   CALCIUM  mg/dL 8.5 8.4 8.4   < >  --     < > = values in this interval not displayed.     Results from last 7 days   Lab Units 02/26/24  0351 02/25/24  0409 02/24/24  0404 02/21/24  0947 02/19/24  1652 02/19/24  1322 02/19/24  1233   INR  1.61* 1.71* 1.83*   < > 0.96 1.72* 2.67*   PTT seconds  --   --   --   --  30 34 43*    < > = values in this interval not displayed.         Date:   INR:  Coumadin Dose:  2/26  1.61  5  2/25  1.71  5  2/24  1.83  2.5  2/23  1.86  2.5  2/22  1.93  0  2/21  1.97  2.5            Point of care glucose: 122 - 190    Studies:  None today    I have personally reviewed pertinent reports.   and I have personally reviewed pertinent films in PACS    Invasive Lines/Tubes:  Invasive Devices       Central Venous Catheter Line  Duration             CVC Central Lines 02/19/24 6 days              Drain  Duration             Urethral Catheter Non-latex;Temperature probe 16 Fr. 6 days                    Physical Exam:    General: No acute distress, Alert, and Normal appearance  HEENT/NECK:  Normocephalic. Atraumatic.  no jugular venous distention.    Cardiac: Irregularly irregular rate and rhythm and No murmurs/rubs/gallops  Pulmonary:  Breath sounds diminished in the bases bilaterally  and No rales/rhonchi/wheezes  Abdomen:  Non-tender, Non-distended, and Normal bowel sounds  Incisions: Sternum is stable.  Incision dressed with Acticoat.  No erythema or drainage  Extremities: Extremities warm/dry and Trace edema B/L  Neuro: Alert and oriented X 3, Sensation is grossly intact, and No focal deficits  Skin: Warm/Dry, without rashes or lesions.    Assessment:  Principal Problem:    S/P AVR  Active Problems:    Hiatal hernia    Atrial fibrillation (HCC) [I48.91]    Acquired deformity of foot    Diabetic polyneuropathy associated with type 2 diabetes mellitus (HCC)    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HCC)    RLS (restless legs syndrome)    Essential hypertension    Multiple lung nodules    Severe obesity (BMI  35.0-39.9) with comorbidity ([facility])    GERD (gastroesophageal reflux disease)    Chronic edema    Deep venous insufficiency    Stage 3a chronic kidney disease ([facility])    Chronic diastolic CHF (congestive heart failure) ([facility])    Aortic stenosis, severe       Aortic stenosis, Non-Rheumatic. S/P aortic valve replacement; POD # 7    Plan:    Cardiac:     Elective surgical admission  Normal ventricular systolic function, EF 55%    Atrial Fibrillation; HR well-controlled  BP well-controlled    Continue Lopressor, 50mg PO BID    Hold ACE inhibitor/ARB secondary to renal dysfunction    Continue prophylactic Amiodarone, 200 mg PO TID    Anticoagulated for Afib  INR 1.61, Dose Coumadin, 5 mg today    Continue ASA and Statin therapy    Epicardial pacing wires have been removed    Maintain central IV access today for medications requiring central IV access    Continue Subcutaneous Heparin for DVT prophylaxis    Pulmonary:     Acute post-op pulmonary insufficiency; Requiring 4 liters via nasal cannla, secondary to atelectasis, pulmonary vascular congestion, poor inspiratory effort secondary to pain, and body habitus/obesity. Continue incentive spirometry/coughing/deep breathing exercises.  Wean supplemental oxygen as tolerated for saturation > 90%    Chest tubes have been discontinued    Renal:     Postoperative CHAYO, with creatinine 2.21 today, from baseline 1.1;  Follow up daily BMP, peaked at 2.7    Intake/Output net: -1.9 L/24 hours    Diuretic Regimen:  Transition to  IV Bumex,  1  mg TID  Start Potassium Chloride 20 mEq PO TID    Neuro:    Neurologically intact; No active issues     Incisional pain well controlled   Continue tylenol, 975 mg PO q 8, standing dose   Continue oxycodone, 2.5 to 5 mg PO q 4 hours prn pain  Cont home gabapentin    GI:    Cardiac diet, with 1800 mL fluid restriction    Tolerating diet without complaint  + Flatus    Continue stool softeners and prn suppository    Continue GI prophylaxis    Endo:      Pre-Op Hgb A1C: 6.1    Patient has been transitioned from continuous insulin infusion to intermittent subcutaneous dosing  Serum glucose well controlled on insulin sliding scale coverage    7    Hematology:     Post-operative acute blood loss anemia; Hemoglobin 8; trend prn  Leukocytosis; Afebrile with no signs of infection; Trend CBC prn    8.   Disposition:        Following daily PT/OT recommendations regarding home vs. rehab when medically cleared for discharge  D/c planning to rehab, not medically ready pending kidney recovery and O2 requirements      VTE Pharmacologic Prophylaxis: Warfarin (Coumadin)  VTE Mechanical Prophylaxis: sequential compression device    Collaborative rounds completed with supervising physician  Plan of care discussed with bedside nurse    SIGNATURE: Yari Peñaloza PA-C  DATE: February 26, 2024  TIME: 7:39 AM

## 2024-02-26 NOTE — PHYSICAL THERAPY NOTE
PHYSICAL THERAPY NOTE          Patient Name: Natividad Ortiz  Today's Date: 2/26/2024 02/26/24 0917   PT Last Visit   PT Visit Date 02/26/24   Note Type   Note Type Treatment   Pain Assessment   Pain Assessment Tool FLACC   Pain Location/Orientation Orientation: Mid;Location: Chest;Location: Incision   Pain Onset/Description Onset: Ongoing;Frequency: Intermittent;Descriptor: Aching;Descriptor: Discomfort   Effect of Pain on Daily Activities guarding   Patient's Stated Pain Goal No pain   Hospital Pain Intervention(s) Repositioned;Ambulation/increased activity;Emotional support   Pain Rating: FLACC (Rest) - Face 0   Pain Rating: FLACC (Rest) - Legs 0   Pain Rating: FLACC (Rest) - Activity 0   Pain Rating: FLACC (Rest) - Cry 0   Pain Rating: FLACC (Rest) - Consolability 0   Score: FLACC (Rest) 0   Pain Rating: FLACC (Activity) - Face 1   Pain Rating: FLACC (Activity) - Legs 0   Pain Rating: FLACC (Activity) - Activity 0   Pain Rating: FLACC (Activity) - Cry 0   Pain Rating: FLACC (Activity) - Consolability 1   Score: FLACC (Activity) 2   Restrictions/Precautions   Other Precautions Cardiac/sternal;Multiple lines;Telemetry;O2   General   Chart Reviewed Yes   Additional Pertinent History cleared for Tx session by nsg   Response to Previous Treatment Patient with no complaints from previous session.   Cognition   Overall Cognitive Status WFL   Arousal/Participation Alert;Cooperative   Attention Within functional limits   Orientation Level Oriented to person;Oriented to place;Oriented to situation   Memory Decreased recall of precautions   Following Commands Follows one step commands without difficulty   Subjective   Subjective Alert; in the chair; agreeable to try mobilization   Transfers   Sit to Stand 3  Moderate assistance   Additional items Assist x 2;Increased time required;Verbal cues  (2 trials)   Stand to Sit 3  Moderate  assistance   Additional items Assist x 2;Increased time required;Verbal cues  (2 trials)   Ambulation/Elevation   Gait pattern Not appropriate;Not tested   Assistive Device Rolling walker   Balance   Static Sitting Fair -   Dynamic Sitting Poor +   Static Standing Poor   Dynamic Standing Poor -   Activity Tolerance   Activity Tolerance Patient limited by fatigue   Medical Staff Made Aware Co-Tx for mobility performed w/ OTR due to complexity of medical status and level of skilled assistance required;   Nurse Made Aware spoke to NAJMA Mo   Exercises   Hip Abduction Sitting;15 reps;AAROM;Bilateral   Knee AROM Long Arc Quad Sitting;15 reps;AROM;Bilateral   Ankle Pumps Sitting;15 reps;AROM;Bilateral   Marching Sitting;5 reps;AAROM;AROM;Bilateral  (2 sets)   Balance training  Standing balance/tolerance training x 15 sec and x 30 sec. Standing marching attempt (limited foot elevation) x 5 reps each LE   Assessment   Prognosis Fair   Problem List Decreased strength;Decreased endurance;Impaired balance;Decreased mobility;Obesity   Assessment Pt demonstrated min improvement in mobility skills progressing to mod (A)x2 w/ transfers trials at bedside; pt still exhibits overall mod weakness and deconditioning w/ decreased standing balance/tolerance and inability to progress to amb at this time; overall, cont to recommend rehab upon D/C; will follow   Goals   Patient Goals to get better   LTG Expiration Date 03/08/24   PT Treatment Day 1   Plan   Treatment/Interventions Functional transfer training;LE strengthening/ROM;Therapeutic exercise;Endurance training;Bed mobility;Gait training;Spoke to nursing;Spoke to case management;OT   Progress Slow progress, decreased activity tolerance   PT Frequency 3-5x/wk   Discharge Recommendation   Rehab Resource Intensity Level, PT I (Maximum Resource Intensity)   AM-PAC Basic Mobility Inpatient   Turning in Flat Bed Without Bedrails 2   Lying on Back to Sitting on Edge of Flat Bed Without  Bedrails 1   Moving Bed to Chair 1   Standing Up From Chair Using Arms 1   Walk in Room 1   Climb 3-5 Stairs With Railing 1   Basic Mobility Inpatient Raw Score 7   Turning Head Towards Sound 4   Follow Simple Instructions 4   Low Function Basic Mobility Raw Score  15   Low Function Basic Mobility Standardized Score  23.9   Highest Level Of Mobility   -HLM Goal 2: Bed activities/Dependent transfer   -HLM Achieved 3: Sit at edge of bed   Education   Education Provided Mobility training;Assistive device   Patient Reinforcement needed   End of Consult   Patient Position at End of Consult Bedside chair;All needs within reach       Dimitry Bocanegra

## 2024-02-26 NOTE — PLAN OF CARE
Problem: OCCUPATIONAL THERAPY ADULT  Goal: Performs self-care activities at highest level of function for planned discharge setting.  See evaluation for individualized goals.  Description: Treatment Interventions: ADL retraining, Functional transfer training, Endurance training, Patient/family training, Continued evaluation, Cardiac education, Energy conservation, Activityengagement, Equipment evaluation/education          See flowsheet documentation for full assessment, interventions and recommendations.   Outcome: Progressing  Note: Limitation: Decreased ADL status, Decreased Safe judgement during ADL, Decreased cognition, Decreased endurance, Decreased self-care trans, Decreased high-level ADLs  Prognosis: Fair  Assessment: Pt seen for OT treatment session day 1 on this date focused on ADL retraining, functional transfers, energy conservation, functional endurance, recall of safety precautions, and patient education. Pt was greeted in chair and was cooperative throughout session. Following session, pt was left in chair with chair alarm on and all needs within reach, PT present. Pt continues to be limited by functional status related to ADLs and transfers requiring MOD A for UB bathing, MAX A for LB bathing, MIN A for UB dressing. The patient's raw score on the -PAC Daily Activity Inpatient Short Form is 16. A raw score of less than 19 suggests the patient may benefit from discharge to post-acute rehabilitation services. Please refer to the recommendation of the Occupational Therapist for safe discharge planning. Pt would benefit from continued acute OT intervention with plan to continue OT treatment sessions 2-3x per week. Recommend d/c to post acute rehab services.     Rehab Resource Intensity Level, OT: I (Maximum Resource Intensity)

## 2024-02-27 NOTE — PHYSICAL THERAPY NOTE
PHYSICAL THERAPY NOTE          Patient Name: Natividad Ortiz  Today's Date: 2/27/2024 02/27/24 1405   PT Last Visit   PT Visit Date 02/27/24   Note Type   Note Type Treatment   Pain Assessment   Pain Assessment Tool 0-10   Pain Score No Pain   Restrictions/Precautions   Other Precautions Cardiac/sternal;Telemetry;O2;Hard of hearing;Fall Risk;Limb alert   General   Chart Reviewed Yes   Additional Pertinent History cleared for Tx session by nsg (requested by RN to mobilize the pt OOB)   Response to Previous Treatment Patient with no complaints from previous session.   Cognition   Overall Cognitive Status WFL   Arousal/Participation Alert;Cooperative   Attention Within functional limits   Orientation Level Oriented to person;Oriented to place;Oriented to situation   Memory Decreased recall of precautions   Following Commands Follows one step commands without difficulty   Subjective   Subjective Alert; in bed; agreeable to mobilize   Bed Mobility   Supine to Sit 2  Maximal assistance   Additional items Assist x 2;HOB elevated;Increased time required;Verbal cues;LE management   Transfers   Sit to Stand 3  Moderate assistance   Additional items Assist x 2;Increased time required;Verbal cues  (3 trials)   Stand to Sit 3  Moderate assistance   Additional items Assist x 2;Increased time required;Verbal cues  (3 trials)   Stand pivot 3  Moderate assistance   Additional items Assist x 2;Increased time required;Verbal cues  (bed to drop arm chair to the (R) side)   Ambulation/Elevation   Gait pattern Excessively slow;Short stride;Shuffling;Decreased foot clearance   Gait Assistance 3  Moderate assist   Additional items Assist x 2;Verbal cues;Tactile cues   Assistive Device Rolling walker   Distance 2 ft  (a few steps fwd and bwd at bedside)   Balance   Static Sitting Fair -   Dynamic Sitting Poor +   Static Standing Poor   Dynamic  Standing Poor -   Ambulatory Poor -   Activity Tolerance   Activity Tolerance Patient limited by fatigue   Medical Staff Made Aware Co-Tx for mobility performed w/ OTR due to complexity of medical status and level of skilled assistance required;   Nurse Made Aware spoke to NAJMA Dumont   Exercises   Hip Abduction Sitting;15 reps;AAROM;Bilateral   Knee AROM Long Arc Quad Sitting;15 reps;AROM;Bilateral   Ankle Pumps Sitting;15 reps;AROM;Bilateral   Marching Sitting;10 reps;AROM;Bilateral   Balance training  Standing balance/tolerance training x 30 sec   Assessment   Prognosis Good   Problem List Decreased strength;Decreased endurance;Impaired balance;Decreased mobility;Obesity   Assessment Pt cont to gradually improve in overall strength, balance and endurance progressing to limited amb trial at bedside, incl SPT transition from bed to chair; (A)x2 is still required w/ all aspects of mobility to assure safety and to facilitate comfort; overall, cont to recommend rehab upon D/C when medically cleared; will follow   Barriers to Discharge Inaccessible home environment   Goals   Patient Goals to cont getting better   LTG Expiration Date 03/08/24   Long Term Goal #1 Additional amb goal:  Pt will amb 30 ft w/ rw, min (A)x1 in order to initiate return to at least limited household amb status.   PT Treatment Day 2   Plan   Treatment/Interventions Functional transfer training;LE strengthening/ROM;Therapeutic exercise;Endurance training;Bed mobility;Gait training;Spoke to nursing;Spoke to case management;OT   Progress Progressing toward goals   PT Frequency 3-5x/wk   Discharge Recommendation   Rehab Resource Intensity Level, PT I (Maximum Resource Intensity)   Equipment Recommended Walker   Walker Package Recommended Wheeled walker   AM-PAC Basic Mobility Inpatient   Turning in Flat Bed Without Bedrails 2   Lying on Back to Sitting on Edge of Flat Bed Without Bedrails 1   Moving Bed to Chair 1   Standing Up From Chair Using Arms 1    Walk in Room 1   Climb 3-5 Stairs With Railing 1   Basic Mobility Inpatient Raw Score 7   Turning Head Towards Sound 4   Follow Simple Instructions 4   Low Function Basic Mobility Raw Score  15   Low Function Basic Mobility Standardized Score  23.9   Highest Level Of Mobility   -HLM Goal 2: Bed activities/Dependent transfer   -HLM Achieved 4: Move to chair/commode   Education   Education Provided Mobility training;Assistive device   Patient Demonstrates verbal understanding;Reinforcement needed   End of Consult   Patient Position at End of Consult Bedside chair;All needs within reach     Dimitry Bocanegra

## 2024-02-27 NOTE — PLAN OF CARE
Problem: OCCUPATIONAL THERAPY ADULT  Goal: Performs self-care activities at highest level of function for planned discharge setting.  See evaluation for individualized goals.  Description: Treatment Interventions: ADL retraining, Functional transfer training, Endurance training, Patient/family training, Continued evaluation, Cardiac education, Energy conservation, Activityengagement, Equipment evaluation/education          See flowsheet documentation for full assessment, interventions and recommendations.   Outcome: Progressing  Note: Limitation: Decreased ADL status, Decreased Safe judgement during ADL, Decreased cognition, Decreased endurance, Decreased self-care trans, Decreased high-level ADLs  Prognosis: Fair  Assessment: Pt seen for OT treatment session day 3 on this date focused on ADL retraining, functional transfers and mobility, energy conservation, functional endurance, recall of safety precautions, and patient education. Pt was greeted in bed and was cooperative throughout session. Following session, pt was left in chair with all needs within reach. Pt continues to be limited by functional status related to ADLs and transfers requiring MAX A for toileting, although demonstrates improvements in seated grooming requiring setup, supervision, pt also progresses to take a couple steps bed>chair with MOD A x 2 on this date. The patient's raw score on the -PAC Daily Activity Inpatient Short Form is 16. A raw score of less than 19 suggests the patient may benefit from discharge to post-acute rehabilitation services. Please refer to the recommendation of the Occupational Therapist for safe discharge planning. Pt would benefit from continued acute OT intervention with plan to continue OT treatment sessions 2-3x per week. Recommend d/c to post acute rehab services.     Rehab Resource Intensity Level, OT: I (Maximum Resource Intensity)

## 2024-02-27 NOTE — OCCUPATIONAL THERAPY NOTE
Occupational Therapy Progress Note     Patient Name: Natividad Ortiz  Today's Date: 2/27/2024  Problem List  Principal Problem:    S/P AVR  Active Problems:    Hiatal hernia    Atrial fibrillation (HCC) [I48.91]    Acquired deformity of foot    Diabetic polyneuropathy associated with type 2 diabetes mellitus (HCC)    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HCC)    RLS (restless legs syndrome)    Essential hypertension    Multiple lung nodules    Severe obesity (BMI 35.0-39.9) with comorbidity (HCC)    GERD (gastroesophageal reflux disease)    Chronic edema    Deep venous insufficiency    Stage 3a chronic kidney disease (HCC)    Chronic diastolic CHF (congestive heart failure) (HCC)    Aortic stenosis, severe            02/27/24 1402   OT Last Visit   OT Visit Date 02/27/24   Note Type   Note Type Treatment   Pain Assessment   Pain Assessment Tool 0-10   Pain Score No Pain   Restrictions/Precautions   Weight Bearing Precautions Per Order No   Other Precautions Cardiac/sternal;Telemetry;O2;Fall Risk;Hard of hearing;Cognitive   Lifestyle   Autonomy Pta pt I with ADL, IADL and fxnal mobility using cane, (+)    Reciprocal Relationships supportive spouse   Service to Others retired   Intrinsic Gratification enjoys watching TV   ADL   Where Assessed Chair   Grooming Assistance 5  Supervision/Setup   Grooming Deficit Setup;Supervision/safety;Increased time to complete   Grooming Comments Pt brushes teeth when seated in chair, requiring setup   Toileting Assistance  2  Maximal Assistance   Toileting Deficit Steadying;Perineal hygiene   Toileting Comments Pt requiring MAX A for cleanup, post. hygiene following slight incontinence of bowel during each STS performed   Bed Mobility   Supine to Sit 2  Maximal assistance   Additional items Assist x 2;Increased time required;Verbal cues;LE management   Additional Comments Pt greeted in bed, left in chair with all needs within reach, PT present   Transfers   Sit to Stand 3  Moderate  assistance   Additional items Assist x 2;Increased time required;Verbal cues  (with RW)   Stand to Sit 3  Moderate assistance   Additional items Assist x 2;Increased time required;Verbal cues   Stand pivot 3  Moderate assistance   Additional items Assist x 2;Increased time required;Verbal cues  (with HHA)   Additional Comments STS x 3, two trials of STS with RW, with HHA during SPT bed>chair   Functional Mobility   Functional Mobility 3  Moderate assistance   Additional Comments Pt performs a couple steps forwards with MOD A x 2 with RW as well as a couple steps bed>chair during SPT with MOD A x 2 with HHA   Additional items Rolling walker;Hand hold assistance   Cognition   Overall Cognitive Status WFL  (with some higher level deficits)   Arousal/Participation Cooperative   Attention Within functional limits   Orientation Level Oriented to person;Oriented to place;Oriented to situation   Memory Decreased recall of precautions   Following Commands Follows one step commands without difficulty   Comments Pt cooperative to therapy although anxious at times, benefitting from vc for encouragement.   Activity Tolerance   Activity Tolerance Patient limited by fatigue   Medical Staff Made Aware Co-tx with DPT due to high medical complexity, assist from therapy aid NAJMA Beth cleared for therapy   Assessment   Assessment Pt seen for OT treatment session day 3 on this date focused on ADL retraining, functional transfers and mobility, energy conservation, functional endurance, recall of safety precautions, and patient education. Pt was greeted in bed and was cooperative throughout session. Following session, pt was left in chair with all needs within reach. Pt continues to be limited by functional status related to ADLs and transfers requiring MAX A for toileting, although demonstrates improvements in seated grooming requiring setup, supervision, pt also progresses to take a couple steps bed>chair with MOD A x 2 on this date.  The patient's raw score on the AM-PAC Daily Activity Inpatient Short Form is 16. A raw score of less than 19 suggests the patient may benefit from discharge to post-acute rehabilitation services. Please refer to the recommendation of the Occupational Therapist for safe discharge planning. Pt would benefit from continued acute OT intervention with plan to continue OT treatment sessions 2-3x per week. Recommend d/c to post acute rehab services.   Plan   Treatment Interventions ADL retraining;Functional transfer training;Endurance training;Patient/family training;Continued evaluation;Energy conservation;Activityengagement   Goal Expiration Date 03/08/24   OT Treatment Day 2   OT Frequency 2-3x/wk   Discharge Recommendation   Rehab Resource Intensity Level, OT I (Maximum Resource Intensity)   AM-PAC Daily Activity Inpatient   Lower Body Dressing 2   Bathing 2   Toileting 2   Upper Body Dressing 3   Grooming 3   Eating 4   Daily Activity Raw Score 16   Daily Activity Standardized Score (Calc for Raw Score >=11) 35.96   AM-PAC Applied Cognition Inpatient   Following a Speech/Presentation 3   Understanding Ordinary Conversation 4   Taking Medications 3   Remembering Where Things Are Placed or Put Away 3   Remembering List of 4-5 Errands 3   Taking Care of Complicated Tasks 3   Applied Cognition Raw Score 19   Applied Cognition Standardized Score 39.77   Modified [place] Scale   Modified [place] Scale 4   End of Consult   Education Provided Yes   Patient Position at End of Consult Bedside chair;All needs within reach   Nurse Communication Nurse aware of consult       MIGUEL Taylor, OTR/L

## 2024-02-27 NOTE — CASE MANAGEMENT
Case Management Discharge Planning Note    Patient name Natividad Ortiz  Location [facility] 404/[facility] 404-01 MRN [id number]  : 1940 Date 2024       Current Admission Date: 2024  Current Admission Diagnosis:S/P AVR   Patient Active Problem List    Diagnosis Date Noted    S/P AVR 2024    Chronic venous hypertension (idiopathic) with ulcer of right lower extremity (CODE) ([facility]) 2024    Cellulitis of right leg 2023    Non-healing wound of left lower extremity 2023    Aortic stenosis, severe 2023    Chronic left shoulder pain 2023    Chronic diastolic CHF (congestive heart failure) ([facility]) 2022    Coagulopathy ([facility]) 2022    Status post total knee replacement using cement, right 2022    Stage 3a chronic kidney disease ([facility]) 10/06/2021    Exotropia of right eye 2021    Seasonal allergies 2021    Leg swelling 2020    Status post total knee replacement using cement, left 2020    Lichen sclerosus et atrophicus of the vulva 2020    Colon polyps 2019    Chronic edema 2019    Deep venous insufficiency 2019    Pacemaker     GERD (gastroesophageal reflux disease)     Mild cognitive impairment 2018    Vitamin D deficiency 2018    Carrero's esophagus with dysplasia 2018    Sensory polyneuropathy 2018    RLS (restless legs syndrome) 2018    Acquired deformity of foot 2018    Corns 2018    Diabetic polyneuropathy associated with type 2 diabetes mellitus (HCC) 2018    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[facility]) 2018    Radiculopathy of lumbar region 2018    Atrial fibrillation ([facility]) [I48.91] 2018    Dense breast tissue on mammogram 2017    Difficulty walking 2017    Flat foot 2017    Onychomycosis 2017    Hiatal hernia     Multiple lung nodules 2015    Severe obesity (BMI 35.0-39.9) with comorbidity ([facility]) 2014    Osteopenia of  multiple sites 04/16/2014    Arthritis 03/11/2014    Essential hypertension 03/11/2014    Lichen sclerosus et atrophicus 05/08/2013    Peripheral neuropathy 05/08/2013      LOS (days): 8  Geometric Mean LOS (GMLOS) (days): 8.7  Days to GMLOS:0.6     OBJECTIVE:  Risk of Unplanned Readmission Score: 31.58         Current admission status: Inpatient   Preferred Pharmacy:   [facility] [address] [address] [address]  Phone: [phone] Fax: [phone]    [facility] - [place], [place] - [address]  [address]  [place] [address]  Phone: [phone] Fax: [phone]    Primary Care Provider: Sandy Gupta MD    Primary Insurance: MEDICARE  Secondary Insurance: COMMERCIAL MISCELLANEOUS    DISCHARGE DETAILS:                                                                                                 Additional Comments: Pt reeived list of accepting facilities and [facility].

## 2024-02-27 NOTE — PLAN OF CARE
Problem: PHYSICAL THERAPY ADULT  Goal: Performs mobility at highest level of function for planned discharge setting.  See evaluation for individualized goals.  Description: Treatment/Interventions: Functional transfer training, LE strengthening/ROM, Therapeutic exercise, Endurance training, Bed mobility, Gait training, Spoke to nursing, OT          See flowsheet documentation for full assessment, interventions and recommendations.  Outcome: Progressing  Note: Prognosis: Good  Problem List: Decreased strength, Decreased endurance, Impaired balance, Decreased mobility, Obesity  Assessment: Pt cont to gradually improve in overall strength, balance and endurance progressing to limited amb trial at bedside, incl SPT transition from bed to chair; (A)x2 is still required w/ all aspects of mobility to assure safety and to facilitate comfort; overall, cont to recommend rehab upon D/C when medically cleared; will follow  Barriers to Discharge: Inaccessible home environment     Rehab Resource Intensity Level, PT: I (Maximum Resource Intensity)    See flowsheet documentation for full assessment.

## 2024-02-27 NOTE — PROGRESS NOTES
Progress Note - Cardiothoracic Surgery   Natividad Ortiz 83 y.o. female MRN: [id number]  Unit/Bed#: [facility] 404-01 Encounter: [id number]    Aortic stenosis, Non-Rheumatic. S/P aortic valve replacement; POD # 8      24 Hour Events: Hypotension last evening. Albumin 25% ordered q8 hrs. Metoprolol decreased as well, +nausea. Straight cathed after foely removed, due for bladder scan this morning, may need catheter replaced    Medications:   Scheduled Meds:  Current Facility-Administered Medications   Medication Dose Route Frequency Provider Last Rate    acetaminophen  650 mg Rectal Q4H PRN Uzma Garrett PA-C      acetaminophen  650 mg Oral Q6H While awake Uzma Garrett PA-C      Albumin 25%  25 g Intravenous Q8H [facility] Fidencio Murillo PA-C Stopped (02/27/24 0709)    amiodarone  200 mg Oral Q8H [facility] Uzma Garrett PA-C      aspirin  81 mg Oral Daily Uzma Garrett PA-C      atorvastatin  80 mg Oral Daily With Dinner Uzma Garrett PA-C      bisacodyl  10 mg Rectal Daily PRN Uzma Garrett PA-C      bumetanide  1 mg Intravenous TID Fidencio Murillo PA-C      ferrous sulfate  325 mg Oral Daily With Breakfast Uzma Garrett PA-C      gabapentin  200 mg Oral TID Uzma Garrett PA-C      heparin (porcine)  5,000 Units Subcutaneous Q8H [facility] Uzma Garrett PA-C      insulin lispro  1-5 Units Subcutaneous TID AC Uzma Garrett PA-C      insulin lispro  1-5 Units Subcutaneous HS Uzma Garrett PA-C      levalbuterol  1.25 mg Nebulization BID Uzma Garrett PA-C      metoprolol tartrate  25 mg Oral Q12H [facility] Fidencio Murillo PA-C      ondansetron  4 mg Intravenous Q6H PRN Uzma Garrett PA-C      oxyCODONE  2.5 mg Oral Q4H PRN Uzma Garrett PA-C      Or    oxyCODONE  5 mg Oral Q4H PRN Uzma Garrett PA-C      pantoprazole  40 mg Oral Early Morning Uzma Garrett PA-C      polyethylene glycol  17  g Oral Daily Uzma Garrett PA-C      potassium chloride  20 mEq Oral TID With Meals Yari Peñaloza PA-C      pramipexole  1 mg Oral HS Uzma Garrett PA-C      senna-docusate sodium  1 tablet Oral BID Uzma Garrett PA-C      sodium chloride  4 mL Nebulization BID Uzma Garrett PA-C      temazepam  15 mg Oral HS PRN Uzma Garrett PA-C       Continuous Infusions:     PRN Meds:.  acetaminophen    bisacodyl    ondansetron    oxyCODONE **OR** oxyCODONE    temazepam    Vitals:   Vitals:    02/26/24 2316 02/27/24 0325 02/27/24 0600 02/27/24 0602   BP: 108/66 91/54  98/59   BP Location: Right arm Right arm     Pulse: 82 70  71   Resp: (!) 24 20     Temp: (!) 97.3 °F (36.3 °C)      TempSrc: Oral      SpO2: 95% 95%  96%   Weight:   107 kg (236 lb 8.9 oz)    Height:           Telemetry: Atrial Fibrillation; Heart Rate: 70s    Respiratory:   SpO2: SpO2: 96 %; 3 LPM    Intake/Output:     Intake/Output Summary (Last 24 hours) at 2/27/2024 0734  Last data filed at 2/27/2024 0709  Gross per 24 hour   Intake 117.33 ml   Output 1348 ml   Net -1230.67 ml    UO 3L    Chest tube Output:  removed    Weights:   Weight (last 2 days)       Date/Time Weight    02/27/24 0600 107 (236.55)    02/26/24 0535 105 (231.92)    02/25/24 0538 107 (236.55)              Results:   Results from last 7 days   Lab Units 02/27/24  0443 02/26/24  0351 02/25/24  0317   WBC Thousand/uL 12.82* 12.87* 9.87   HEMOGLOBIN g/dL 7.5* 8.0* 8.1*   HEMATOCRIT % 24.2* 26.0* 26.7*   PLATELETS Thousands/uL 247 220 169     Results from last 7 days   Lab Units 02/27/24  0443 02/26/24  0351 02/25/24  2142   SODIUM mmol/L 133* 135 138   POTASSIUM mmol/L 4.5 3.8 3.7   CHLORIDE mmol/L 87* 89* 91*   CO2 mmol/L 36* 35* 34*   BUN mg/dL 96* 85* 86*   CREATININE mg/dL 2.61* 2.21* 2.26*   CALCIUM mg/dL 8.8 8.5 8.4     Results from last 7 days   Lab Units 02/27/24  0443 02/26/24  0351 02/25/24  0409   INR  1.88* 1.61* 1.71*          Date:   INR:  Coumadin Dose:  2/27  1.88  5  2/26  1.61  5  2/25  1.71  5  2/24  1.83  2.5  2/23  1.86  2.5  2/22  1.93  0  2/21  1.97  2.5            Point of care glucose: 112 - 177    Studies:  None today    I have personally reviewed pertinent reports.   and I have personally reviewed pertinent films in PACS    Invasive Lines/Tubes:  Invasive Devices       Central Venous Catheter Line  Duration             CVC Central Lines 02/19/24 7 days                    Physical Exam:    General: Alert and Normal appearance  HEENT/NECK:  Normocephalic. Atraumatic.  no jugular venous distention.    Cardiac: Irregularly irregular rate and rhythm and No murmurs/rubs/gallops  Pulmonary:  Breath sounds diminished in the bases bilaterally  and No rales/rhonchi/wheezes  Abdomen:  Non-tender, Non-distended, and Normal bowel sounds  Incisions: Sternum is stable.  Incision is clean, dry, and intact.   Extremities: Extremities warm/dry and Trace edema B/L  Neuro: Alert and oriented X 3, Sensation is grossly intact, and No focal deficits  Skin: Warm/Dry, without rashes or lesions.       Assessment:  Principal Problem:    S/P AVR  Active Problems:    Hiatal hernia    Atrial fibrillation ([facility]) [I48.91]    Acquired deformity of foot    Diabetic polyneuropathy associated with type 2 diabetes mellitus ([facility])    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[facility])    RLS (restless legs syndrome)    Essential hypertension    Multiple lung nodules    Severe obesity (BMI 35.0-39.9) with comorbidity ([facility])    GERD (gastroesophageal reflux disease)    Chronic edema    Deep venous insufficiency    Stage 3a chronic kidney disease ([facility])    Chronic diastolic CHF (congestive heart failure) ([facility])    Aortic stenosis, severe       Aortic stenosis, Non-Rheumatic. S/P aortic valve replacement; POD # 8    Plan:    Cardiac:     Elective surgical admission  Normal ventricular systolic function, EF 55%    Atrial Fibrillation; HR well-controlled  BP  well-controlled    Continue Lopressor, 25mg PO BID (decreased yesterday due to some hypotension)    Hold ACE inhibitor/ARB secondary to renal dysfunction    Continue prophylactic Amiodarone, 200 mg PO TID    Anticoagulated for Afib  INR 1.88, Dose Coumadin, 5 mg today    Continue ASA 81 and Statin therapy    Epicardial pacing wires have been removed    D/C TLC    Continue Coumadin for DVT prophylaxis    Pulmonary:     Acute post-op pulmonary insufficiency; Requiring 3 liters via nasal cannula, secondary to atelectasis, pulmonary vascular congestion, poor inspiratory effort secondary to pain, and body habitus/obesity. Continue incentive spirometry/coughing/deep breathing exercises.  Wean supplemental oxygen as tolerated for saturation > 90%    Chest tubes have been discontinued    Renal:     Postoperative CHAYO, with creatinine 2.61 from 2.21, from baseline 1.1;  Follow up daily BMP, peaked at 2.7    Intake/Output net: -1.3 L/24 hours    Diuretic Regimen:   Albumin 25% ordered q8hr, likely intravascularly dry  Decrease to IV Bumex,  1  mg BID  Decrease to Potassium Chloride 20 mEq PO QD, K 4.5  , monitor    Neuro:    Neurologically intact; No active issues     Incisional pain well controlled   Continue tylenol, 975 mg PO q 8, standing dose   Continue oxycodone, 2.5 to 5 mg PO q 4 hours prn pain  Cont home gabapentin    GI:    Cardiac diet, with 1800 mL fluid restriction    Tolerating diet without complaint  + Flatus  Increase Bowel regimen    Continue stool softeners and prn suppository    Continue GI prophylaxis    Endo:     Pre-Op Hgb A1C: 6.1    Patient has been transitioned from continuous insulin infusion to intermittent subcutaneous dosing  Serum glucose well controlled on insulin sliding scale coverage    7    Hematology:     Post-operative acute blood loss anemia; Hemoglobin 7.5; trend prn  Leukocytosis; Afebrile with no signs of infection; Trend CBC prn  May benefit from unit of PRBCs today  Iron and Vit  C    8.   Disposition:        Following daily PT/OT recommendations regarding home vs. rehab when medically cleared for discharge  D/c planning to rehab, not medically ready pending kidney recovery and O2 requirements      VTE Pharmacologic Prophylaxis: Warfarin (Coumadin)  VTE Mechanical Prophylaxis: sequential compression device    Collaborative rounds completed with supervising physician  Plan of care discussed with bedside nurse    SIGNATURE: Yari Peñaloza PA-C  DATE: February 27, 2024  TIME: 7:34 AM

## 2024-02-28 NOTE — SEDATION DOCUMENTATION
Right side Thoracentesis performed by Jo Sood PA-C, 950 ML  Bloody fluids obtained, left side thoracentis not performed,Patient tolerated well, Images saves

## 2024-02-28 NOTE — CONSULTS
[facility]  Consult: Critical Care   Date of Service: 2/28/2024  Hospital Day: 9  Name: Natividad Ortiz I  MRN: [id number]  Unit/Bed#: [facility] 404-01    Consults  Assessment/Plan     Impressions:  Severe AS s/p aortic valve replacement with pericardial tissue valve  ABLA  Acute hypercapnic respiratory failure  Paroxysmal atrial fibrillation on chronic anticoagulation  Sick sinus syndrome s/p PPM  Chronic diastolic CHF  GERD  Carrero's esophagus  CKD 3  Lung nodules  Venous insufficiency  Left breast CA s/p lumpectomy/radiation  CHAYO    Neuro:   ATC tylenol, PRN oxycodone for pain  Trend neuro exam  Delirium precautions    CV:   Goals:   Cardiac Surgery Hemodynamic Monitoring goals: MAP goal >65  Volume resuscitation as needed.   Epicardial pacing wire plan : Epicardial wires not in place  Monitor rhythm on telemetry.        Lung:   Check STAT ABG and CXR  Continuous BiPAP    GI:   GI prophylaxis with PPI  Bowel regimen  Zofran PRN for nausea.     FEN:   NPO Replenish K >4.0, mag >2.0 and calcium >7.0.     :   Check STAT BMP  Bumex gtt @1  Consider replacing noble  Monitor UOP with goal >0.5cc/kg/hour.  Lasix versus volume resuscitate as needed depending on hemodynamics and volume status.    ID:   Maintain normothermia. Trend temps.     Heme:   Monitor incision site, invasive lines, and chest tube outputs for bleeding. Send coag panel if needed.  Monitor Hb and plt    Endo:   SSI for blood sugar control.     Disposition: ICU Care        Subjective     HPI: Natividad Ortiz is a 83 y.o. female 83 yr old F with PMH of HTN, PAF on Xarelto s/p 2005 ablation and multiple failed cardioversion, SSS s/p PPM, chronic diastolic CHF, GERD, Barretts, CKD3a, multiple lung nodules, venous insufficiency, L breast Ca s/p lumpectomy/radiation, presents 2/19 for surgical AVR. Postoperative course has been complicated by acute respiratory failure and bleeding. The morning of 2/28, she was noted  to be somnolent. ABG revealed hypercarbia. Also with low urine output. Started on continuous BiPAP and Bumex gtt re-initiated and transferred back to ICU.     ROS: ROS unable to be obtained due to patient being intubated and sedated.     History obtained from chart review due to patient being intubated and sedated.        Objective          Vitals: Invasive Monitoring      /89   Pulse 97   Resp 16   O2 Sat 93 %   O2 Device Mid flow nasal cannula   Temp 97.5 °F (36.4 °C)         Physical Exam   Physical Exam  Vitals reviewed.   Skin:     General: Skin is warm, dry and not mottled extremities.      Capillary Refill: Capillary refill takes less than 2 seconds.      Coloration: Skin is not pale.   Cardiovascular:      Rate and Rhythm: Normal rate and regular rhythm.   Abdominal: General: There is no distension.      Palpations: Abdomen is soft.   Constitutional:       Appearance: She is ill-appearing.   Pulmonary:      Effort: Tachypnea, accessory muscle usage and accessory muscle usage present. No respiratory distress.   Neurological:      Mental Status: She is alert. She is somnolent, disoriented to place and disoriented to time.          Diagnostic Studies      02/28/24 EKG: reviewed.   This was personally reviewed by myself.       Medications:  Scheduled PRN   acetaminophen, 650 mg, Q6H While awake  amiodarone, 200 mg, Q8H ANTON  aspirin, 81 mg, Daily  atorvastatin, 80 mg, Daily With Dinner  ferrous sulfate, 325 mg, Daily With Breakfast  gabapentin, 200 mg, TID  insulin lispro, 1-5 Units, TID AC  insulin lispro, 1-5 Units, HS  levalbuterol, 1.25 mg, BID  metoprolol tartrate, 25 mg, Q12H ANTON  pantoprazole, 40 mg, Early Morning  polyethylene glycol, 17 g, Daily  pramipexole, 1 mg, HS  senna-docusate sodium, 1 tablet, BID  sodium chloride, 4 mL, BID      acetaminophen, 650 mg, Q4H PRN  bisacodyl, 5 mg, Daily PRN  bisacodyl, 10 mg, Daily PRN  magnesium hydroxide, 30 mL, Daily PRN  metoclopramide, 10 mg, Q6H  PRN  ondansetron, 4 mg, Q6H PRN  oxyCODONE, 2.5 mg, Q4H PRN   Or  oxyCODONE, 5 mg, Q4H PRN  temazepam, 15 mg, HS PRN       Continuous    bumetanide (BUMEX) 12.5 mg infusion 50 mL, 1 mg/hr         Labs:  CBC    Recent Labs     02/27/24  0443 02/28/24  0610   WBC 12.82* 13.40*   HGB 7.5* 8.9*   HCT 24.2* 30.1*    305     BMP    Recent Labs     02/27/24  1439 02/28/24  0506   SODIUM 131* 129*   K 4.8 5.1   CL 86* 85*   CO2 32 30   AGAP 13 14   BUN 98* 105*   CREATININE 2.88* 3.06*   CALCIUM 8.8 8.7       Coags    Recent Labs     02/27/24  0443   INR 1.88*        Additional Electrolytes  No recent results       Blood Gas    No recent results  No recent results LFTs  No recent results    Infectious    No recent results     No recent results Glucose  Recent Labs     02/27/24  0443 02/27/24  1439 02/28/24  0506   GLUC 118 186* 122          Invasive lines and devices:  Invasive Devices       Peripheral Intravenous Line  Duration             Peripheral IV 02/27/24 Right;Ventral (anterior) Forearm <1 day                     Historical Information   Past Medical History:  No date: Arthritis  No date: Atrial fibrillation (HCC)  No date: Carrero's esophagus      Comment:  last assessed: 1/23/2018  No date: BRCA1 negative  No date: BRCA2 negative  2006: Breast cancer (HCC)      Comment:  stage 1 (left), given adjuvant radiation with Arimidex x               5 years  2006: Cancer (HCC)      Comment:  Left Breast, Lumpectomy  No date: Cataract      Comment:  last assessed: 3/11/2014  11/21/2022: Chronic diastolic CHF (congestive heart failure) (HCC)  No date: Dysplasia of toenail      Comment:  last assessed: 8/29/2017  No date: Esophageal reflux  No date: GERD (gastroesophageal reflux disease)  No date: Gross hematuria      Comment:  last assessed: 2/19/2015  No date: Hematuria  No date: Hiatal hernia  No date: History of radiation therapy  No date: Hypertension  No date: Irregular heart beat      Comment:  AFIB  No date:  Mixed sensory-motor polyneuropathy  No date: Neuropathy  No date: Obesity  No date: Pacemaker  No date: Paroxysmal atrial fibrillation (HCC)  02/13/2018: 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HCC)  No date: Peripheral neuropathy  No date: Rectal bleeding  No date: Restless leg syndrome  No date: Shortness of breath      Comment:  last assessed: 1/11/2016 Past Surgical History:  2006: BREAST BIOPSY; Left  2006: BREAST LUMPECTOMY; Left      Comment:  onset: 2006  No date: BREAST SURGERY  9/5/2023: CARDIAC CATHETERIZATION; N/A      Comment:  Procedure: Cardiac RHC/LHC;  Surgeon: Patric England MD;                 [facility];  Service: Cardiology  9/5/2023: CARDIAC CATHETERIZATION; N/A      Comment:  Procedure: Cardiac Coronary Angiogram;  Surgeon: Patric England MD;  [facility];  Service:                Cardiology  9/5/2023: CARDIAC CATHETERIZATION      Comment:  Procedure: Cardiac catheterization;  Surgeon: Patric England MD;  [facility];  Service:                Cardiology  No date: CARDIAC PACEMAKER PLACEMENT      Comment:  x 3 2006  No date: CATARACT EXTRACTION  No date: COLONOSCOPY  04/04/2014: CYSTOSCOPY      Comment:  diagnostic  No date: HYSTERECTOMY      Comment:  ALISON BSO; due to fibroid uterus; age 42  No date: KNEE CARTILAGE SURGERY      Comment:  excision lesion of meniscus or capsule knee  No date: KNEE SURGERY  No date: OOPHORECTOMY; Bilateral      Comment:  age 42  No date: OTHER SURGICAL HISTORY      Comment:  radiation therapy  08/17/2020: AZ ARTHRP KNE CONDYLE&PLATU MEDIAL&LAT COMPARTMENTS; Left      Comment:  Procedure: ARTHROPLASTY KNEE TOTAL;  Surgeon: Melquiades Sterling DO;  Location: [facility] OR;  Service: Orthopedics  03/28/2022: AZ ARTHRP KNE CONDYLE&PLATU MEDIAL&LAT COMPARTMENTS; Right      Comment:  Procedure: ARTHROPLASTY KNEE TOTAL W ROBOT - RIGHT;                 Surgeon: Melquiades Sterling DO;  Location: [facility]  OR;                 Service: Orthopedics  02/08/2017: AZ COLONOSCOPY FLX DX W/COLLJ SPEC WHEN PFRMD; N/A      Comment:  Procedure: COLONOSCOPY;  Surgeon: Desean Ham MD;                 Location: [facility];  Service: Gastroenterology  09/20/2017: AZ ESOPHAGOGASTRODUODENOSCOPY TRANSORAL DIAGNOSTIC; N/A      Comment:  Procedure: ESOPHAGOGASTRODUODENOSCOPY (EGD);  Surgeon:                Jacob Morrell MD;  Location: [facility];  Service:                Gastroenterology  2/19/2024: AZ RPLCMT AORTIC VALVE OPN W/STENTLESS TISSUE VALVE; N/A      Comment:  Procedure: REPLACEMENT VALVE AORTIC (AVR) WITH  21mm                INSPRIS TISSUE VALVE;  Surgeon: Liang Mccullough DO;                 Location: [facility];  Service: Cardiac Surgery  No date: UPPER GASTROINTESTINAL ENDOSCOPY   Current Outpatient Medications   Medication Instructions    ascorbic acid (VITAMIN C) 500 mg, Oral, 2 times daily    Calcium Acetate, Phos Binder, (CALCIUM ACETATE PO) Oral, Daily    clobetasol (TEMOVATE) 0.05 % ointment Apply once weekly to the affected area    famotidine (PEPCID) 40 mg, Oral, Every evening    fluticasone (FLONASE) 50 mcg/act nasal spray SPRAY 1 SPRAY INTO EACH NOSTRIL EVERY DAY    gabapentin (NEURONTIN) 800 mg tablet TAKE 1 TABLET BY MOUTH FOUR TIMES A DAY    loratadine (CLARITIN) 10 mg, Oral, Daily, As needed    magnesium 30 mg, Oral, Daily    metoprolol tartrate (LOPRESSOR) 50 mg tablet Take 2 tablets in the a.m., 1 tablet in the p.m.    multivitamin (THERAGRAN) TABS 1 tablet, Oral, Daily    mupirocin (BACTROBAN) 2 % ointment Topical, 2 times daily    pantoprazole (PROTONIX) 20 mg, Oral, Every morning    pramipexole (MIRAPEX) 0.5 mg tablet TAKE 2 FULL TABLETS TWICE A DAY AT 5:00 P.M. AND 10:00 P.M.    rivaroxaban (XARELTO) 20 mg, Oral, Daily with breakfast    torsemide (DEMADEX) 20 mg, Oral, Daily    Allergies   Allergen Reactions    Latex      Added based on information entered during case entry, please review and add  reactions, type, and severity as needed    Duloxetine Hcl Other (See Comments)     Facial pins and needles sensation    Erythromycin Rash    Levofloxacin Other (See Comments)     Muscular aches    Penicillins Rash    Savella [Milnacipran] Rash    Sulfa Antibiotics Rash      Social History     Tobacco Use    Smoking status: Former     Current packs/day: 0.00     Average packs/day: 1 pack/day for 25.0 years (25.0 ttl pk-yrs)     Types: Cigarettes     Start date: 1955     Quit date: 1980     Years since quittin.4    Smokeless tobacco: Never    Tobacco comments:     Quit over 30 years ago; quit age 47   Vaping Use    Vaping status: Never Used   Substance Use Topics    Alcohol use: Not Currently    Drug use: No    Family History   Problem Relation Age of Onset    Hypertension Mother     Heart disease Father     Aneurysm Father     Coronary artery disease Father         in his 70s with aneurysm    Aortic aneurysm Father         abdominal    Scleroderma Sister     Breast cancer Sister 68    Hypertension Sister     Cancer Sister     No Known Problems Son     No Known Problems Son     Testicular cancer Son 41    Thyroid cancer Son 38    Colon cancer Maternal Aunt     Colon cancer Maternal Aunt     Breast cancer Other 50        kaylee's daughter    Alcohol abuse Neg Hx     Substance Abuse Neg Hx     Mental illness Neg Hx     Depression Neg Hx             SIGNATURE: Deangelo Matos PA-C

## 2024-02-28 NOTE — CONSULTS
NEPHROLOGY HOSPITAL CONSULTATION   Natividad Ortiz 83 y.o. female MRN: [id number]  Unit/Bed#: [facility] 418-01 Encounter: [id number]    ASSESSMENT and PLAN:    Acute kidney injury:  Etiology: Recent cardiac surgery with hemodynamic perturbations during surgery and postoperatively, fluctuations in blood pressure, anemia contributing to initial CHAYO.  Secondary increase in creatinine likely multifactorial with hypotension/relative hypotension/cardiorenal  Baseline creatinine: 0.9 to 1.1 mg/dL.  Prior albumin creatinine ratio mildly elevated.  Admitted on 2/19 creatinine at baseline increasing to the mid twos postop AVR then declining to the low twos as of 2/26 after which secondary rise in creatinine to the current level of 3.06.  Urine output recorded as 375 mL for the last 24 hours.  Prior urine output levels were quite acceptable.  Poor response to Bumex started on Bumex infusion 2/28 in light of chest x-ray findings of pulmonary edema  Status post 1 unit of packed cells on 2/27.  Intermittent periods of hypotension/relative hypotension on 2/27  Straight cath several times: On 2/27 straight cath 400 mL, 2/28 straight cath to 75 mL,. Ledesma catheter placed on 2/28  Renal imaging: Last renal imaging August 2023 CTA chest abdomen and pelvis: Kidneys unremarkable, no hydronephrosis  Recommendations:  Check urinalysis with microscopic evaluation  Agree with Ledesma catheter at this time: Strict monitoring of intake and output.  Pulmonary edema: Agree with Bumex infusion  Avoid hypotension, keep MAP greater than 65  Avoid nephrotoxic agents  Closely monitor renal function  Renal ultrasound for completeness   if no improvement may need to consider discussion regarding renal replacement therapy in the next 24 to 48 hours.  At this time patient is too somnolent to discuss renal replacement therapy.    Hypertension:  Blood pressure appears to have been relatively low on 2/27.  Status post 1 unit of packed cells.  Also received a  course of albumin 2/26 to 2/27 2/28: Blood pressure improved    Acute respiratory failure:  Hypercarbia on BiPAP  Chest x-ray most suggestive of pulmonary edema with pleural effusions right side greater than left.  Cardiomegaly  Appears stable on BiPAP support.    Severe aortic stenosis:   Status post tissue AVR.  Cardiac surgery following    Chronic diastolic CHF/PAF/SSS s/p PPM    Anemia:  Status post 1 unit of packed cells on 2/27  Received 1 dose of Epogen 2/24 on oral iron supplement    Other problems: GERD, Carrero's esophagus, breast cancer status postlumpectomy and radiation    HISTORY OF PRESENT ILLNESS:  Requesting Physician: Liang Mccullough DO  Reason for Consult: Acute kidney injury    Natividad Ortiz is a 83 y.o. female with a past medical history of hypertension, PAF, obesity, GERD, Carrero's gas, venous insufficiency, lung nodules, peripheral neuropathy, left breast cancer status postlumpectomy and radiation treatment in 2006, restless leg syndrome, permanent pacemaker for sick sinus syndrome.  She also has a moderate to severe aortic stenosis was admitted 2/19 for AVR.  Status post tissue AVR recently required 1 unit of packed cells.  Developed acute respiratory failure requiring BiPAP support on 2/27.  Chest x-ray revealing pulmonary edema.  Patient is more somnolent and was transferred to CCU.  Ledesma catheter was replaced for strict NUBIA and intermittent episodes of urinary retention.  Patient was seen and examined.  She awakens briefly but does not take part in conversation at this time.  Creatinine initially increased postoperatively and then began to decline with secondary rise over the last 24 to 48 hours prompting nephrology consult.    PAST MEDICAL HISTORY:  Past Medical History:   Diagnosis Date    Arthritis     Atrial fibrillation (HCC)     Carrero's esophagus     last assessed: 1/23/2018    BRCA1 negative     BRCA2 negative     Breast cancer (HCC) 2006    stage 1 (left), given  adjuvant radiation with Arimidex x 5 years    Cancer (HCC) 2006    Left Breast, Lumpectomy    Cataract     last assessed: 3/11/2014    Chronic diastolic CHF (congestive heart failure) ([facility]) 11/21/2022    Dysplasia of toenail     last assessed: 8/29/2017    Esophageal reflux     GERD (gastroesophageal reflux disease)     Gross hematuria     last assessed: 2/19/2015    Hematuria     Hiatal hernia     History of radiation therapy     Hypertension     Irregular heart beat     AFIB    Mixed sensory-motor polyneuropathy     Neuropathy     Obesity     Pacemaker     Paroxysmal atrial fibrillation (HCC)     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[facility]) 02/13/2018    Peripheral neuropathy     Rectal bleeding     Restless leg syndrome     Shortness of breath     last assessed: 1/11/2016       PAST SURGICAL HISTORY:  Past Surgical History:   Procedure Laterality Date    BREAST BIOPSY Left 2006    BREAST LUMPECTOMY Left 2006    onset: 2006    BREAST SURGERY      CARDIAC CATHETERIZATION N/A 9/5/2023    Procedure: Cardiac RHC/LHC;  Surgeon: Patric England MD;  Location: [facility];  Service: Cardiology    CARDIAC CATHETERIZATION N/A 9/5/2023    Procedure: Cardiac Coronary Angiogram;  Surgeon: Patric England MD;  [facility]: [facility];  Service: Cardiology    CARDIAC CATHETERIZATION  9/5/2023    Procedure: Cardiac catheterization;  Surgeon: Patric England MD;  [facility]: [facility];  Service: Cardiology    CARDIAC PACEMAKER PLACEMENT      x 3 2006    CATARACT EXTRACTION      COLONOSCOPY      CYSTOSCOPY  04/04/2014    diagnostic    HYSTERECTOMY      ALISON BSO; due to fibroid uterus; age 42    KNEE CARTILAGE SURGERY      excision lesion of meniscus or capsule knee    KNEE SURGERY      OOPHORECTOMY Bilateral     age 42    OTHER SURGICAL HISTORY      radiation therapy    MO ARTHRP KNE CONDYLE&PLATU MEDIAL&LAT COMPARTMENTS Left 08/17/2020    Procedure: ARTHROPLASTY KNEE TOTAL;  Surgeon: Melquiades Sterling DO;  Location: [facility];   Service: Orthopedics    DC ARTHRP KNE CONDYLE&PLATU MEDIAL&LAT COMPARTMENTS Right 2022    Procedure: ARTHROPLASTY KNEE TOTAL W ROBOT - RIGHT;  Surgeon: Melquiades Sterling DO;  Location: [facility];  Service: Orthopedics    DC COLONOSCOPY FLX DX W/COLLJ SPEC WHEN PFRMD N/A 2017    Procedure: COLONOSCOPY;  Surgeon: Desean Ham MD;  Location:  GI LAB;  Service: Gastroenterology    DC ESOPHAGOGASTRODUODENOSCOPY TRANSORAL DIAGNOSTIC N/A 2017    Procedure: ESOPHAGOGASTRODUODENOSCOPY (EGD);  Surgeon: Jacob Morrell MD;  Location: [facility];  Service: Gastroenterology    DC RPLCMT AORTIC VALVE OPN W/STENTLESS TISSUE VALVE N/A 2024    Procedure: REPLACEMENT VALVE AORTIC (AVR) WITH  21mm INSPRIS TISSUE VALVE;  Surgeon: Liang Mccullough DO;  Location:  MAIN OR;  Service: Cardiac Surgery    UPPER GASTROINTESTINAL ENDOSCOPY         ALLERGIES:  Allergies   Allergen Reactions    Latex      Added based on information entered during case entry, please review and add reactions, type, and severity as needed    Duloxetine Hcl Other (See Comments)     Facial pins and needles sensation    Erythromycin Rash    Levofloxacin Other (See Comments)     Muscular aches    Penicillins Rash    Savella [Milnacipran] Rash    Sulfa Antibiotics Rash       SOCIAL HISTORY:  Social History     Substance and Sexual Activity   Alcohol Use Not Currently     Social History     Substance and Sexual Activity   Drug Use No     Social History     Tobacco Use   Smoking Status Former    Current packs/day: 0.00    Average packs/day: 1 pack/day for 25.0 years (25.0 ttl pk-yrs)    Types: Cigarettes    Start date: 1955    Quit date: 1980    Years since quittin.4   Smokeless Tobacco Never   Tobacco Comments    Quit over 30 years ago; quit age 47       FAMILY HISTORY:  Family History   Problem Relation Age of Onset    Hypertension Mother     Heart disease Father     Aneurysm Father     Coronary artery disease Father         in his  70s with aneurysm    Aortic aneurysm Father         abdominal    Scleroderma Sister     Breast cancer Sister 68    Hypertension Sister     Cancer Sister     No Known Problems Son     No Known Problems Son     Testicular cancer Son 41    Thyroid cancer Son 38    Colon cancer Maternal Aunt     Colon cancer Maternal Aunt     Breast cancer Other 50        kaylee's daughter    Alcohol abuse Neg Hx     Substance Abuse Neg Hx     Mental illness Neg Hx     Depression Neg Hx        MEDICATIONS:    Current Facility-Administered Medications:     acetaminophen (TYLENOL) rectal suppository 650 mg, 650 mg, Rectal, Q4H PRN, Yari Peñaloza PA-C    acetaminophen (TYLENOL) tablet 650 mg, 650 mg, Oral, Q6H While awake, Yari Peñaloza PA-C, 650 mg at 02/28/24 0034    amiodarone tablet 200 mg, 200 mg, Oral, Q8H ANTON, Yari Peñaloza PA-C, 200 mg at 02/28/24 0558    aspirin (ECOTRIN LOW STRENGTH) EC tablet 81 mg, 81 mg, Oral, Daily, Yari Peñaloza PA-C, 81 mg at 02/27/24 0839    atorvastatin (LIPITOR) tablet 80 mg, 80 mg, Oral, Daily With Dinner, Yari Peñaloza PA-C, 80 mg at 02/27/24 1554    bisacodyl (DULCOLAX) EC tablet 5 mg, 5 mg, Oral, Daily PRN, Yari Peñaloza PA-C    bisacodyl (DULCOLAX) rectal suppository 10 mg, 10 mg, Rectal, Daily PRN, Yari Peñaloza PA-C, 10 mg at 02/27/24 1104    bumetanide (BUMEX) 12.5 mg infusion 50 mL, 1 mg/hr, Intravenous, Continuous, Yari Peñaloza PA-C, Last Rate: 4 mL/hr at 02/28/24 1009, 1 mg/hr at 02/28/24 1009    chlorhexidine (PERIDEX) 0.12 % oral rinse 15 mL, 15 mL, Mouth/Throat, Q12H ANTON, Deangelo Matos PA-C    ferrous sulfate tablet 325 mg, 325 mg, Oral, Daily With Breakfast, Yari Peñaloza PA-C, 325 mg at 02/27/24 0730    insulin lispro (HumaLOG) 100 units/mL subcutaneous injection 1-5 Units, 1-5 Units, Subcutaneous, TID AC, 1 Units at 02/27/24 1555 **AND** Fingerstick Glucose (POCT), , , TERRY PARSON, Yari Peñaloza PA-C    insulin lispro  (HumaLOG) 100 units/mL subcutaneous injection 1-5 Units, 1-5 Units, Subcutaneous, HS, Yari Peñaloza PA-C, 1 Units at 02/24/24 2141    levalbuterol (XOPENEX) inhalation solution 1.25 mg, 1.25 mg, Nebulization, BID, Yari Peñaloza PA-C, 1.25 mg at 02/28/24 0751    magnesium hydroxide (MILK OF MAGNESIA) oral suspension 30 mL, 30 mL, Oral, Daily PRN, Yari Peñaloza PA-C, 30 mL at 02/27/24 1726    metoclopramide (REGLAN) injection 10 mg, 10 mg, Intravenous, Q6H PRN, Yari Peñaloza PA-C    metoprolol tartrate (LOPRESSOR) tablet 25 mg, 25 mg, Oral, Q12H ANTON, Yari Peñaloza PA-C, 25 mg at 02/27/24 2100    ondansetron (ZOFRAN) injection 4 mg, 4 mg, Intravenous, Q6H PRN, Yari Peñaloza PA-C    oxyCODONE (ROXICODONE) split tablet 2.5 mg, 2.5 mg, Oral, Q4H PRN **OR** oxyCODONE (ROXICODONE) IR tablet 5 mg, 5 mg, Oral, Q4H PRN, Yari Peñaloza PA-C, 5 mg at 02/22/24 1843    pantoprazole (PROTONIX) EC tablet 40 mg, 40 mg, Oral, Early Morning, Yari Peñaloza PA-C, 40 mg at 02/28/24 0558    polyethylene glycol (MIRALAX) packet 17 g, 17 g, Oral, Daily, Yari Peñaloza PA-C, 17 g at 02/27/24 0839    pramipexole (MIRAPEX) tablet 1 mg, 1 mg, Oral, HS, Yari Peñaloza PA-C, 1 mg at 02/27/24 2255    senna-docusate sodium (SENOKOT S) 8.6-50 mg per tablet 1 tablet, 1 tablet, Oral, BID, Yari Peñaloza PA-C, 1 tablet at 02/27/24 1726    sodium chloride 3 % inhalation solution 4 mL, 4 mL, Nebulization, BID, Yari Peñaloza PA-C, 4 mL at 02/28/24 0751    temazepam (RESTORIL) capsule 15 mg, 15 mg, Oral, HS PRN, Yari Peñaloza PA-C    warfarin (COUMADIN) tablet 2.5 mg, 2.5 mg, Oral, Once (warfarin), Yari Peñaloza PA-C    REVIEW OF SYSTEMS:  Constitutional: Positive for fatigue  HENT: Negative for postnasal drip  Eyes: Negative for visual disturbance.   Respiratory: Respiratory status improved with BiPAP support  Cardiovascular: Operative chest pain, leg  swelling  Gastrointestinal: No abdominal pain.  No reports of nausea or vomiting  Genitourinary: No dysuria, hematuria.  Ledesma catheter placed   Musculoskeletal: Negative for arthralgias  Skin: Negative for rash.   Neurological: Negative for focal weakness  Hematological: Positive for postoperative bleeding  Psychiatric/Behavioral: No disturbance  All the systems were reviewed and were negative except as documented on the HPI.    PHYSICAL EXAM:  Current Weight: Weight - Scale: 109 kg (240 lb 4.8 oz)  First Weight: Weight - Scale: 103 kg (227 lb 11.2 oz)  Vitals:    02/28/24 0300 02/28/24 0302 02/28/24 0400 02/28/24 0712   BP:    135/89   Pulse: 87   97   Resp:    16   Temp:    97.5 °F (36.4 °C)   TempSrc:       SpO2: 92% 93% 98% 93%   Weight:    109 kg (240 lb 4.8 oz)   Height:           Intake/Output Summary (Last 24 hours) at 2/28/2024 1059  Last data filed at 2/28/2024 0915  Gross per 24 hour   Intake 635 ml   Output 460 ml   Net 175 ml     Physical Exam  Vitals reviewed.   Constitutional:       Appearance: She is well-developed.   HENT:      Head: Normocephalic and atraumatic.      Nose: No congestion.      Mouth/Throat:      Mouth: Mucous membranes are moist.   Eyes:      General: No scleral icterus.        Right eye: No discharge.         Left eye: No discharge.      Extraocular Movements: Extraocular movements intact.      Conjunctiva/sclera: Conjunctivae normal.   Neck:      Thyroid: No thyromegaly.      Vascular: No carotid bruit.      Trachea: No tracheal deviation.      Comments: Difficult to assess for JVD  Cardiovascular:      Rate and Rhythm: Normal rate. Rhythm irregular.      Heart sounds: No murmur heard.     No friction rub. No gallop.   Pulmonary:      Effort: Pulmonary effort is normal. No respiratory distress.      Breath sounds: No stridor. Examination of the right-lower field reveals rales. Examination of the left-lower field reveals rales. Rales present. No wheezing or rhonchi.      Comments:  BiPAP support  Abdominal:      General: Bowel sounds are normal. There is no distension.      Palpations: Abdomen is soft.      Tenderness: There is no abdominal tenderness.   Musculoskeletal:         General: No deformity or signs of injury. Normal range of motion.      Cervical back: Normal range of motion and neck supple. No rigidity or tenderness.      Right lower leg: Edema present.      Left lower leg: Edema present.   Skin:     General: Skin is warm and dry.      Capillary Refill: Capillary refill takes less than 2 seconds.      Coloration: Skin is not jaundiced or pale.      Findings: No erythema or rash.   Neurological:      Mental Status: She is oriented to person, place, and time and easily aroused. She is lethargic.   Psychiatric:         Mood and Affect: Mood normal.         Behavior: Behavior normal.           Invasive Devices:   Urethral Catheter Non-latex (Active)   Site Assessment Red 02/28/24 1030   Collection Container Standard drainage bag 02/28/24 1030   Securement Method Securing device (Describe) 02/28/24 1030   Securing Device Change Date 03/06/24 02/28/24 1030     Lab Results:   Results from last 7 days   Lab Units 02/28/24  0859 02/28/24  0610 02/28/24  0506 02/27/24  1439 02/27/24  0443 02/26/24  0351 02/25/24  1000 02/25/24  0317 02/24/24  0405 02/24/24  0404 02/23/24  0950 02/23/24  0407 02/22/24  0958 02/22/24  0403   WBC Thousand/uL  --  13.40*  --   --  12.82* 12.87*  --  9.87  --  11.51*  --   --    < > 17.65*   HEMOGLOBIN g/dL  --  8.9*  --   --  7.5* 8.0*  --  8.1*  --  7.6*  --   --    < > 7.2*   I STAT HEMOGLOBIN g/dl 10.9*  --   --   --   --   --   --   --   --   --   --   --   --   --    HEMATOCRIT %  --  30.1*  --   --  24.2* 26.0*  --  26.7*  --  24.8*  --   --    < > 24.1*   HEMATOCRIT, ISTAT % 32*  --   --   --   --   --   --   --   --   --   --   --   --   --    PLATELETS Thousands/uL  --  305  --   --  247 220  --  169  --  134*  --   --    < > 111*   POTASSIUM mmol/L  --    --  5.1 4.8 4.5 3.8   < > 3.7   < >  --    < > 3.6   < > 3.9   CHLORIDE mmol/L  --   --  85* 86* 87* 89*   < > 94*   < >  --    < > 102   < > 106   CO2 mmol/L  --   --  30 32 36* 35*   < > 31   < >  --    < > 28   < > 25   CO2, I-STAT mmol/L 35*  --   --   --   --   --   --   --   --   --   --   --   --   --    BUN mg/dL  --   --  105* 98* 96* 85*   < > 79*   < >  --    < > 61*   < > 50*   CREATININE mg/dL  --   --  3.06* 2.88* 2.61* 2.21*   < > 2.17*   < >  --    < > 2.28*   < > 2.29*   CALCIUM mg/dL  --   --  8.7 8.8 8.8 8.5   < > 8.1*   < >  --    < > 8.7   < > 8.3*   MAGNESIUM mg/dL  --   --   --   --   --  2.3  --  2.4  --  2.5  --  2.4  --  2.3   PHOSPHORUS mg/dL  --   --   --   --   --   --   --   --   --   --   --  3.7  --  3.0   GLUCOSE, ISTAT mg/dl 152*  --   --   --   --   --   --   --   --   --   --   --   --   --     < > = values in this interval not displayed.     Other Studies:

## 2024-02-28 NOTE — PROGRESS NOTES
Progress Note - Cardiothoracic Surgery   Natividad Ortiz 83 y.o. female MRN: [id number]  Unit/Bed#: [facility] 404-01 Encounter: [id number]    Aortic stenosis, Non-Rheumatic. S/P aortic valve replacement; POD # 9      24 Hour Events: lethargic and hypoxic. Volume overloaded, Poor UO, Restart bumex gtt reinsert noble and transfer back to ICU. Check Stat ABG and CXR. May need bipap    Medications:   Scheduled Meds:  Current Facility-Administered Medications   Medication Dose Route Frequency Provider Last Rate    acetaminophen  650 mg Rectal Q4H PRN Uzma Garrett PA-C      acetaminophen  650 mg Oral Q6H While awake Uzma Garrett PA-C      amiodarone  200 mg Oral Q8H [facility] Uzma Garrett PA-C      aspirin  81 mg Oral Daily Uzma Garrett PA-C      atorvastatin  80 mg Oral Daily With Dinner Uzma Garrett PA-C      bisacodyl  5 mg Oral Daily PRN Yari Peñaloza PA-C      bisacodyl  10 mg Rectal Daily PRN Uzma Garrett PA-C      bumetanide  1 mg Intravenous BID Yari Peñaloza PA-C      ferrous sulfate  325 mg Oral Daily With Breakfast Uzma Garrett PA-C      gabapentin  200 mg Oral TID Uzma Garrett PA-C      insulin lispro  1-5 Units Subcutaneous TID AC Uzma Garrett PA-C      insulin lispro  1-5 Units Subcutaneous HS Uzma Garrett PA-C      levalbuterol  1.25 mg Nebulization BID Uzma Garrett PA-C      magnesium hydroxide  30 mL Oral Daily PRN Yari Peñaloza PA-C      metoclopramide  10 mg Intravenous Q6H PRN Yari Peñaloza PA-C      metoprolol tartrate  25 mg Oral Q12H [facility] Fidencio Murillo PA-C      ondansetron  4 mg Intravenous Q6H PRN Yari Peñaloza PA-C      oxyCODONE  2.5 mg Oral Q4H PRN Uzma Garrett PA-C      Or    oxyCODONE  5 mg Oral Q4H PRN Uzma Garrett PA-C      pantoprazole  40 mg Oral Early Morning Uzma Garrett PA-C      polyethylene glycol  17 g Oral  Daily Uzma Garrett PA-C      potassium chloride  20 mEq Oral Daily Yari Peñaloza PA-C      pramipexole  1 mg Oral HS Uzma Garrett PA-C      senna-docusate sodium  1 tablet Oral BID Uzma Garrett PA-C      sodium chloride  4 mL Nebulization BID Uzma Garrett PA-C      temazepam  15 mg Oral HS PRN Uzma Garrett PA-C       Continuous Infusions:     PRN Meds:.  acetaminophen    bisacodyl    bisacodyl    magnesium hydroxide    metoclopramide    ondansetron    oxyCODONE **OR** oxyCODONE    temazepam    Vitals:   Vitals:    02/28/24 0300 02/28/24 0302 02/28/24 0400 02/28/24 0712   BP:    135/89   Pulse: 87   97   Resp:    16   Temp:    97.5 °F (36.4 °C)   TempSrc:       SpO2: 92% 93% 98% 93%   Weight:    109 kg (240 lb 4.8 oz)   Height:           Telemetry: Atrial Fibrillation; Heart Rate: 80s-90s    Respiratory:   SpO2: SpO2: 93 %; 3 LPM    Intake/Output:     Intake/Output Summary (Last 24 hours) at 2/28/2024 0814  Last data filed at 2/28/2024 0753  Gross per 24 hour   Intake 635 ml   Output 375 ml   Net 260 ml   UO 375ml    Chest tube Output:  removed    Weights:   Weight (last 2 days)       Date/Time Weight    02/28/24 07:12:48 109 (240.3)    02/27/24 0600 107 (236.55)    02/26/24 0535 105 (231.92)              Results:   Results from last 7 days   Lab Units 02/28/24  0610 02/27/24  0443 02/26/24  0351   WBC Thousand/uL 13.40* 12.82* 12.87*   HEMOGLOBIN g/dL 8.9* 7.5* 8.0*   HEMATOCRIT % 30.1* 24.2* 26.0*   PLATELETS Thousands/uL 305 247 220     Results from last 7 days   Lab Units 02/28/24  0506 02/27/24  1439 02/27/24  0443   SODIUM mmol/L 129* 131* 133*   POTASSIUM mmol/L 5.1 4.8 4.5   CHLORIDE mmol/L 85* 86* 87*   CO2 mmol/L 30 32 36*   BUN mg/dL 105* 98* 96*   CREATININE mg/dL 3.06* 2.88* 2.61*   CALCIUM mg/dL 8.7 8.8 8.8     Results from last 7 days   Lab Units 02/27/24  0443 02/26/24  0351 02/25/24  0409   INR  1.88* 1.61* 1.71*         Date:    INR:  Coumadin Dose:  2/28  pending  2/27  1.88  5  2/26  1.61  5  2/25  1.71  5  2/24  1.83  2.5  2/23  1.86  2.5  2/22  1.93  0  2/21  1.97  2.5            Point of care glucose: 112 - 177    Studies:  None today    I have personally reviewed pertinent reports.   and I have personally reviewed pertinent films in PACS    Invasive Lines/Tubes:  Invasive Devices       Peripheral Intravenous Line  Duration             Peripheral IV 02/27/24 Right;Ventral (anterior) Forearm <1 day                    Physical Exam:    General:  Lethargic, Acute resp distress  HEENT/NECK:  Normocephalic. Atraumatic.    Cardiac: Tachycardic  Pulmonary:  Breath sounds diminished in the bases bilaterally , Crackles bilaterally, and Poor inspiratory effort  Abdomen:  Non-tender, Non-distended, and Normal bowel sounds  Incisions: Sternum is stable.  Incision is clean, dry, and intact.   Extremities: Extremities warm/dry and 1+ edema B/L  Neuro: Alert and oriented X 3 and lethargic  Skin: Warm/Dry, without rashes or lesions.          Assessment:  Principal Problem:    S/P AVR  Active Problems:    Hiatal hernia    Atrial fibrillation ([facility]) [I48.91]    Acquired deformity of foot    Diabetic polyneuropathy associated with type 2 diabetes mellitus ([facility])    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[facility])    RLS (restless legs syndrome)    Essential hypertension    Multiple lung nodules    Severe obesity (BMI 35.0-39.9) with comorbidity ([facility])    GERD (gastroesophageal reflux disease)    Chronic edema    Deep venous insufficiency    Stage 3a chronic kidney disease ([facility])    Chronic diastolic CHF (congestive heart failure) ([facility])    Aortic stenosis, severe       Aortic stenosis, Non-Rheumatic. S/P aortic valve replacement; POD # 9    Plan:    Cardiac:     Elective surgical admission  Normal ventricular systolic function, EF 55%    Atrial Fibrillation; HR well-controlled  BP well-controlled    Continue Lopressor, 25mg PO BID     Hold ACE inhibitor/ARB secondary to  renal dysfunction    Continue prophylactic Amiodarone, 200 mg PO TID    Anticoagulated for Afib  INR pending, Dose Coumadin today    Continue ASA 81 and Statin therapy    Epicardial pacing wires have been removed    D/C TLC    Continue Coumadin for DVT prophylaxis    Pulmonary:     Acute post-op pulmonary insufficiency; Requiring 8L midflow, secondary to atelectasis, pulmonary vascular congestion, poor inspiratory effort secondary to pain, and body habitus/obesity. Continue incentive spirometry/coughing/deep breathing exercises.  Wean supplemental oxygen as tolerated for saturation > 90%  Increased work of breathing, check stat ABG and CXR  Chest tubes have been discontinued    Renal:     Postoperative CHAYO, with creatinine 3.06 from 2.88 from 2.61 from 2.21, from baseline 1.1;  Follow up daily BMP, peaked at 2.7    Intake/Output net: -1.3 L/24 hours    Diuretic Regimen:   Bumex 2mg IV now, consult Renal  Start bumex gtt, replace noble  Hold K, 5.1 today  , monitor    Neuro:    Neurologically intact; No active issues     Incisional pain well controlled   Continue tylenol, 975 mg PO q 8, standing dose   Continue oxycodone, 2.5 to 5 mg PO q 4 hours prn pain  Cont home gabapentin    GI:    Cardiac diet, with 1800 mL fluid restriction    Tolerating diet without complaint  + Flatus  Increase Bowel regimen    Continue stool softeners and prn suppository    Continue GI prophylaxis    Endo:     Pre-Op Hgb A1C: 6.1    Patient has been transitioned from continuous insulin infusion to intermittent subcutaneous dosing  Serum glucose well controlled on insulin sliding scale coverage    7    Hematology:     Post-operative acute blood loss anemia; Hemoglobin 8.9; trend prn  Leukocytosis; Afebrile with no signs of infection; Trend CBC prn  Received 1 u PRBCs yesterday  Iron and Vit C    8.   Disposition:        Following daily PT/OT recommendations regarding home vs. rehab when medically cleared for discharge  D/c planning to  rehab, not medically ready     Transfer back to ICU      VTE Pharmacologic Prophylaxis: Warfarin (Coumadin)  VTE Mechanical Prophylaxis: sequential compression device    Collaborative rounds completed with supervising physician  Plan of care discussed with bedside nurse    SIGNATURE: Yari Peñaloza PA-C  DATE: February 28, 2024  TIME: 8:14 AM

## 2024-02-28 NOTE — PHYSICAL THERAPY NOTE
Physical Therapy Cancellation Note           02/28/24 0910   Note Type   Note Type Cancelled Session   Cancel Reasons Medical status     Pt is in the process of being transferred to critical care unit due to resp status; will follow as clinical course allows.    Dimitry Bocanegra

## 2024-02-28 NOTE — BRIEF OP NOTE (RAD/CATH)
IR THORACENTESIS Procedure Note    PATIENT NAME: Natividad Ortiz  : 1940  MRN: [id number]    Pre-op Diagnosis:   1. S/P AVR    2. Nonrheumatic aortic valve disorder    3. Nonrheumatic aortic valve stenosis    4. CHAYO (acute kidney injury) (HCC)      Post-op Diagnosis:   1. S/P AVR    2. Nonrheumatic aortic valve disorder    3. Nonrheumatic aortic valve stenosis    4. CHAYO (acute kidney injury) (HCC)        Provider:   Jo Sood PA-C  Assistants:     No qualified resident was available, Resident is only observing    Estimated Blood Loss: None    Findings: 950mL sanguinous pleural fluid drained from right thoracentesis  Insufficient fluid and no safe window for left thoracentesis    Specimens: Labs collected and sent    Complications:  None immediately    Anesthesia: local    Jo Sood PA-C     Date: 2024  Time: 3:56 PM

## 2024-02-29 ENCOUNTER — TELEPHONE (OUTPATIENT)
Dept: OTHER | Facility: OTHER | Age: 84
End: 2024-02-29

## 2024-02-29 NOTE — PROGRESS NOTES
[facility]  Progress Note: Critical Care   Date of Service: 2/29/2024  Hospital Day: 10  Name: Natividad Ortiz I  MRN: [id number]  [facility]      Assessment/Plan     Impressions:  Acute hypoxic and hypercapnic respiratory failure  Acute kidney injury  Severe AS s/p aortic valve replacement with pericardial tissue valve  ABLA  Paroxysmal atrial fibrillation on chronic anticoagulation  Sick sinus syndrome s/p PPM  Chronic diastolic CHF  GERD  Carrero's esophagus  CKD 3  Lung nodules  Venous insufficiency  Left breast CA s/p lumpectomy/radiation    Neuro:   Cardiac Surgery Pain Control: ATC tylenol and PRN oxycodone 2.5/5mg  Continue current pain regimen  Delirium precautions  Regulate sleep/wake cycle  CAM-ICU daily  Trend neuro exam      CV:   Cardiac Surgery Hemodynamic Monitoring: MAP goal >65  Follow rhythm on telemetry  Critical Care Infusions : No cardiac infusions  Beta Blocker Plan: Continue current lopressor dosage (25mg BID), received 12.5mg last night due to low BP  Epicardial pacing wire plan : Epicardial wires not in place  Post op cardiac surgery medications:   ASA 81 mg QD  Statin: Lipitor 80 mg qHS  Amiodarone 200 mg PO q8 hours  Coumadin dosing? (AM INR 2.96, received 2.5mg last night)    Lung:   Acute respiratory failure requiring BiPAP 20/5 40% vs MFNC 12L. Secondary to poor inspiratory effort secondary to pain, pulmonary vascular congestion, pleural effusion, and lethargy/altered mental status  Wean BiPAP and MFNC as tolerated  Chest tubes have been discontinued    GI:   Continue PPI for stress ulcer prophylaxis  Continue bowel regimen  Trend abdominal exam and bowel function    FEN:   Diuresis Plan: Bumex infusion 2 mg/hr.  Check Q6 BMPs  Nutrition plan: as tolerated  Replenish electrolytes with goals: K >4.0, Mag >2.0, and Phos >3.0    :   Ledesma Plan: Continue for accurate I/O monitoring for 48 hours  Trend UOP and BUN/creat  Strict I and  O     ID:   Trend temps and WBC count  Maintain normothermia    Heme:   Trend hgb and plts    Endo:   SSI    MSK/Skin:  Mobility goal:   PT/OT consult as medically appropriate   Frequent turning and pressure off-loading  Local wound care as needed    Disposition:  Critical care    ICU Core Measures     A: Assess, Prevent, and Manage Pain Has pain been assessed? Yes  Need for changes to pain regimen? No   B: Both SAT/SAT  N/A   C: Choice of Sedation RASS Goal: N/A patient not on sedation  Need for changes to sedation or analgesia regimen? No   D: Delirium CAM-ICU: Negative   E: Early Mobility  Plan for early mobility? Yes   F: Family Engagement Plan for family engagement today? Yes       Review of Invasive Devices:    Ledesam Plan: Continue for accurate I/O monitoring for 48 hours        Prophylaxis:  VTE VTE covered by:    None       Stress Ulcer  covered byfamotidine (PEPCID) 40 MG tablet [[id number]] (Long-Term Med), pantoprazole (PROTONIX) 20 mg tablet [[id number]] (Long-Term Med), pantoprazole (PROTONIX) EC tablet 40 mg [[id number]]          Significant 24hr Events      24 Hour Events/HPI: POD # 10 s/p AVR. Yesterday AM, was lethargic and oliguric. ABG revealed hypercapnia so BiPAP initiated. Bumex gtt also initiated and patient transferred back to ICU. Has not required pressors. Overnight, continuous BiPAP weaned to MFNC with intermittent BiPAP use.      Subjective   Review of Systems   All other systems reviewed and are negative.       Objective     Medications:  Scheduled Continuous   acetaminophen, 650 mg, Q6H While awake  amiodarone, 200 mg, Q8H ANTON  aspirin, 81 mg, Daily  atorvastatin, 80 mg, Daily With Dinner  chlorhexidine, 15 mL, Q12H ANTON  ferrous sulfate, 325 mg, Daily With Breakfast  insulin lispro, 1-5 Units, TID AC  insulin lispro, 1-5 Units, HS  levalbuterol, 1.25 mg, BID  metoprolol tartrate, 25 mg, Q12H ANTON  pantoprazole, 40 mg, Early Morning  polyethylene glycol, 17 g, Daily  pramipexole, 1 mg,  HS  senna-docusate sodium, 1 tablet, BID  sodium chloride, 4 mL, BID      bumetanide (BUMEX) 12.5 mg infusion 50 mL, 2 mg/hr, Last Rate: 2 mg/hr (02/29/24 0314)         Vitals: Invasive Monitoring       Most Recent Min/Max in 24hrs   Temp 97.7 °F (36.5 °C) Temp  Min: 96.2 °F (35.7 °C)  Max: 98.1 °F (36.7 °C)   Pulse (!) 107 Pulse  Min: 73  Max: 109   Resp 16 Resp  Min: 10  Max: 55   /82 BP  Min: 92/58  Max: 135/89   O2 Sat 99 % SpO2  Min: 84 %  Max: 100 %   O2 Device: Mid flow nasal cannula  Nasal Cannula O2 Flow Rate (L/min): 8 L/min    I/O Chest tube output (if present)     Intake/Output Summary (Last 24 hours) at 2/29/2024 0700  Last data filed at 2/29/2024 0600  Gross per 24 hour   Intake 267.33 ml   Output 1655 ml   Net -1387.67 ml     UOP:   0.65cc/kg/hour for 24h  1.06cc/kg/hour for 12h  BM: 0 in the last 24 hours  Weight change:    No chest tubes present     Physical Exam   Physical Exam  Vitals reviewed.   Skin:     General: Skin is warm, dry and not mottled extremities.      Coloration: Skin is not pale.   Cardiovascular:      Rate and Rhythm: Tachycardia present. Rhythm irregular.   Abdominal: General: There is no distension.      Palpations: Abdomen is soft.   Constitutional:       General: She is not in acute distress.     Appearance: She is ill-appearing.   Pulmonary:      Effort: No respiratory distress.      Breath sounds: Normal breath sounds.   Neurological:      General: No focal deficit present.      Mental Status: She is alert and oriented to person, place and time.   Genitourinary/Anorectal:  Ledesma present.        Diagnostic Studies      02/29/24 CXR: unable to review due to PACS system being down.   02/29/24 EKG: AF rate 90s.   This was personally reviewed by myself.         Labs:  CBC    Recent Labs     02/28/24  0610 02/28/24  0859 02/28/24  1403 02/29/24  0426   WBC 13.40*  --   --  13.28*   HGB 8.9*   < > 9.9* 8.4*   HCT 30.1*   < > 29* 28.7*     --   --  301    < > = values  in this interval not displayed.     BMP    Recent Labs     02/28/24  2344 02/29/24  0000   SODIUM 132* 132*   K 4.8 4.4   CL 86* 85*   CO2 33* 35*   AGAP 13 12   * 114*   CREATININE 3.37* 3.27*   CALCIUM 8.5 8.4        Baseline Creat: 0.8-1.1   Coags    Recent Labs     02/28/24  0848 02/29/24  0000   INR 2.35* 2.96*              Additional Electrolytes  Recent Labs     02/28/24  0859 02/28/24  1403 02/29/24  0000   MG  --   --  2.8*   CAIONIZED 1.10* 1.12  --           Blood Gas    No recent results  Recent Labs     02/29/24  0407   PHVEN 7.278*   [id number] 73.5*   PO2VEN 25.4*   CKJ0HUI 33.6*   BEVEN 5.4   [id number] 40.5*    LFTs  No recent results    Infectious    No recent results     Recent Labs     02/28/24  1559   GRAMSTAIN 3+ Polys  No bacteria seen    Glucose  Recent Labs     02/28/24  0506 02/28/24  1756 02/28/24  2344 02/29/24  0000   GLUC 122 114 97 90        Collaborative bedside rounds performed with cardiac surgery attending, critical care attending and bedside RN.     Deangelo Matos PA-C

## 2024-02-29 NOTE — PROGRESS NOTES
Progress Note - Cardiothoracic Surgery   Natividad Ortiz 83 y.o. female MRN: [id number]  Unit/Bed#: [facility] 418-01 Encounter: [id number]      POD # 10 s/p AVR, TAVR explant.    Pt seen/examined.  Interval history and data reviewed with critical care team.  Transferred back to ICU yesterday for low UOP and respiratory distress. S/p thoracentesis and now on bumex drip.         Medications:   Scheduled Meds:  Current Facility-Administered Medications   Medication Dose Route Frequency Provider Last Rate    acetaminophen  650 mg Rectal Q4H PRN Yari Peñaloza PA-C      acetaminophen  650 mg Oral Q6H While awake Yari Peñaloza PA-C      amiodarone  200 mg Oral Q8H ANTON Yari Peñaloza PA-C      aspirin  81 mg Oral Daily Yari Peñaloza PA-C      atorvastatin  80 mg Oral Daily With Dinner Yari Peñaloza PA-C      bisacodyl  5 mg Oral Daily PRN Yari Peñaloza PA-C      bisacodyl  10 mg Rectal Daily PRN Yari Peñaloza PA-C      bumetanide (BUMEX) 12.5 mg infusion 50 mL  2 mg/hr Intravenous Continuous Deangelo Matos PA-C 2 mg/hr (02/29/24 0314)    chlorhexidine  15 mL Mouth/Throat Q12H [facility] Deangelo Matos PA-C      ferrous sulfate  325 mg Oral Daily With Breakfast Yari Peñaloza PA-C      insulin lispro  1-5 Units Subcutaneous TID AC Yari Peñaloza PA-C      insulin lispro  1-5 Units Subcutaneous HS Yari Peñaloza PA-C      levalbuterol  1.25 mg Nebulization BID Yari Peñaloza PA-C      magnesium hydroxide  30 mL Oral Daily PRN Yari Peñaloza PA-C      metoclopramide  10 mg Intravenous Q6H PRN Yari Peñaloza PA-C      metoprolol tartrate  25 mg Oral Q12H [facility] Deangelo Matos PA-C      ondansetron  4 mg Intravenous Q6H PRN Yari Peñaloza PA-C      oxyCODONE  2.5 mg Oral Q4H PRN Yari Peñaloza PA-C      Or    oxyCODONE  5 mg Oral Q4H PRN Yari Peñaloza PA-C      pantoprazole  40 mg Oral Early Morning Yari Peñaloza PA-C      polyethylene  "glycol  17 g Oral Daily Yari Peñaloza PA-C      pramipexole  1 mg Oral HS Yari Peñaloza PA-C      senna-docusate sodium  1 tablet Oral BID Yari Peñaloza PA-C      sodium chloride  4 mL Nebulization BID Yari Peñaloza PA-C      temazepam  15 mg Oral HS PRN Yari Peñaloza PA-C       Continuous Infusions:bumetanide (BUMEX) 12.5 mg infusion 50 mL, 2 mg/hr, Last Rate: 2 mg/hr (02/29/24 0314)      PRN Meds:.  acetaminophen    bisacodyl    bisacodyl    magnesium hydroxide    metoclopramide    ondansetron    oxyCODONE **OR** oxyCODONE    temazepam    Vitals: Blood pressure 132/82, pulse (!) 107, temperature 97.7 °F (36.5 °C), temperature source Rectal, resp. rate 16, height 5' 4\" (1.626 m), weight 106 kg (234 lb 9.1 oz), SpO2 99%.,Body mass index is 40.26 kg/m².  I/O last 24 hours:  In: 283.3 [P.O.:118; I.V.:165.3]  Out: 2005 [Urine:2005]  Invasive Devices       Peripheral Intravenous Line  Duration             Peripheral IV 02/27/24 Right;Ventral (anterior) Forearm 1 day    Peripheral IV 02/28/24 Proximal;Right;Ventral (anterior) Forearm <1 day              Drain  Duration             Urethral Catheter Non-latex <1 day                      Lab, Imaging and other studies:   Results from last 7 days   Lab Units 02/29/24  0426 02/28/24  1403 02/28/24  0859 02/28/24  0610 02/27/24  0443   WBC Thousand/uL 13.28*  --   --  13.40* 12.82*   HEMOGLOBIN g/dL 8.4*  --   --  8.9* 7.5*   I STAT HEMOGLOBIN g/dl  --  9.9* 10.9*  --   --    HEMATOCRIT % 28.7*  --   --  30.1* 24.2*   HEMATOCRIT, ISTAT %  --  29* 32*  --   --    PLATELETS Thousands/uL 301  --   --  305 247     Results from last 7 days   Lab Units 02/29/24  0000 02/28/24  2344 02/28/24  1756 02/28/24  1403   POTASSIUM mmol/L 4.4 4.8 5.3  --    CHLORIDE mmol/L 85* 86* 90*  --    CO2 mmol/L 35* 33* 34*  --    CO2, I-STAT mmol/L  --   --   --  38*   BUN mg/dL 114* 114* 113*  --    CREATININE mg/dL 3.27* 3.37* 3.55*  --    GLUCOSE, ISTAT mg/dl  --   " "--   --  114   CALCIUM mg/dL 8.4 8.5 8.4  --      Results from last 7 days   Lab Units 02/29/24  0000 02/28/24  0848 02/27/24  0443   INR  2.96* 2.35* 1.88*     No results for input(s): \"PHART\", \"ASO2XNL\", \"PO2ART\", \"EKU7DEH\", \"M2YSREPB\", \"BEART\" in the last 72 hours.        Plan:    Continue bumex for goal negative.   Cr peaked yesterday at 3.5, now downtrending.  Aggressive chest PT, IS  PO ASA/Statin/B blocker.        SIGNATURE: Davian Arenas MD  DATE: February 29, 2024  TIME: 8:55 AM   "

## 2024-02-29 NOTE — TELEPHONE ENCOUNTER
Patient is calling regarding cancelling an appointment.    Date/Time: 2/29/2024 1:00 PM    Patient was rescheduled: YES [] NO [x]    Patient requesting call back to reschedule: YES [x] NO []     Patient is currently admitted in the hospital. Please follow up with patient to re-schedule, after hospital discharge.

## 2024-02-29 NOTE — PROGRESS NOTES
NEPHROLOGY PROGRESS NOTE   Natividad Ortiz 83 y.o. female MRN: [id number]  Unit/Bed#: [facility] 418-01 Encounter: [id number]      ASSESSMENT & PLAN:  #1 acute kidney injury in the setting of hemodynamic perturbation and hypotension, anemia.  Patient was started on Bumex drip, rate was increased to 2 mg/h.  Creatinine peaked at 3.55 on 2/28 afternoon, today down to 3.27  Continue with current regimen, keep MAP over 65 mmHg, continue with Bumex drip.  Monitor ins and outs and follow daily labs  Baseline creatinine 0.9-1.1 by    2 severe AS status post aortic valve replacement with pericardial tissue valve.  Postoperative management as per cardiac surgery team.  Status post right-sided thoracentesis yesterday with 950 cc fluid removed    3.  Acute blood loss anemia, status post blood transfusion, monitor H&H and recommend blood transfusion for hemoglobin less than 7    4 acute hypercapnic respiratory failure, improving, now mid flow    #5 hemodynamic, blood pressure stable in the lower side, keep MAP over 65 mmHg      SUBJECTIVE:  Seen and examined, feeling better and looking better this morning, on flow oxygen.  Denies any significant chest pain or shortness of breath.  Status post right thoracentesis yesterday by IR with 950 cc sanguinous pleural fluid drained    OBJECTIVE:  Current Weight: Weight - Scale: 106 kg (234 lb 9.1 oz)  Vitals:    02/29/24 0800   BP: 123/60   Pulse: 92   Resp: (!) 34   Temp:    SpO2: 100%       Intake/Output Summary (Last 24 hours) at 2/29/2024 1124  Last data filed at 2/29/2024 1000  Gross per 24 hour   Intake 181.33 ml   Output 2020 ml   Net -1838.67 ml       General: Ill-appearing, conscious, cooperative, in not acute distress  Eyes: conjunctivae pink, anicteric sclerae  ENT: lips and mucous membranes moist, oxygen via nasal cannula  Neck: supple, no JVD  Chest: clear breath sounds bilateral, no crackles, ronchus or wheezings  CVS: distinct S1 & S2, normal rate, regular rhythm  Abdomen:  soft, non-tender, non-distended, normoactive bowel sounds  Extremities: Positive edema of both legs  Skin: no rash  Neuro: awake, alert, oriented      Medications:    Current Facility-Administered Medications:     acetaminophen (TYLENOL) rectal suppository 650 mg, 650 mg, Rectal, Q4H PRN, Yari Peñaloza PA-C    acetaminophen (TYLENOL) tablet 650 mg, 650 mg, Oral, Q6H While awake, Yari Peñaloza PA-C, 650 mg at 02/29/24 0803    amiodarone tablet 200 mg, 200 mg, Oral, Q8H ANTON, Yari Peñaloza PA-C, 200 mg at 02/29/24 0553    aspirin (ECOTRIN LOW STRENGTH) EC tablet 81 mg, 81 mg, Oral, Daily, Yari Peñaloza PA-C, 81 mg at 02/29/24 0802    atorvastatin (LIPITOR) tablet 80 mg, 80 mg, Oral, Daily With Dinner, Yari Peñaloza PA-C, 80 mg at 02/27/24 1554    bisacodyl (DULCOLAX) EC tablet 5 mg, 5 mg, Oral, Daily PRN, Yari Peñaloza PA-C    bisacodyl (DULCOLAX) rectal suppository 10 mg, 10 mg, Rectal, Daily PRN, Yari Peñaloza PA-C, 10 mg at 02/27/24 1104    bumetanide (BUMEX) 12.5 mg infusion 50 mL, 2 mg/hr, Intravenous, Continuous, Deangelo Matos PA-C, Last Rate: 8 mL/hr at 02/29/24 0943, 2 mg/hr at 02/29/24 0943    chlorhexidine (PERIDEX) 0.12 % oral rinse 15 mL, 15 mL, Mouth/Throat, Q12H ANTON, Deangelo Matos PA-C, 15 mL at 02/29/24 0803    ferrous sulfate tablet 325 mg, 325 mg, Oral, Daily With Breakfast, Yari Peñaloza PA-C, 325 mg at 02/29/24 0803    insulin lispro (HumaLOG) 100 units/mL subcutaneous injection 1-5 Units, 1-5 Units, Subcutaneous, TID AC, 1 Units at 02/28/24 1126 **AND** Fingerstick Glucose (POCT), , , TID AC, Yari Peñaloza PA-C    insulin lispro (HumaLOG) 100 units/mL subcutaneous injection 1-5 Units, 1-5 Units, Subcutaneous, HS, Yari Peñaloza PA-C, 1 Units at 02/24/24 2141    levalbuterol (XOPENEX) inhalation solution 1.25 mg, 1.25 mg, Nebulization, BID, Yari Peñaloza PA-C, 1.25 mg at 02/29/24 0746    magnesium hydroxide (MILK OF  MAGNESIA) oral suspension 30 mL, 30 mL, Oral, Daily PRN, Yari Peñaloza PA-C, 30 mL at 02/27/24 1726    metoclopramide (REGLAN) injection 10 mg, 10 mg, Intravenous, Q6H PRN, Yari Peñaloza PA-C    metoprolol tartrate (LOPRESSOR) tablet 25 mg, 25 mg, Oral, Q12H ANTON, Deangelo Matos PA-C, 25 mg at 02/29/24 0802    ondansetron (ZOFRAN) injection 4 mg, 4 mg, Intravenous, Q6H PRN, Yari Peñaloza PA-C    oxyCODONE (ROXICODONE) split tablet 2.5 mg, 2.5 mg, Oral, Q4H PRN **OR** oxyCODONE (ROXICODONE) IR tablet 5 mg, 5 mg, Oral, Q4H PRN, Yari Peñaloza PA-C, 5 mg at 02/22/24 1843    pantoprazole (PROTONIX) EC tablet 40 mg, 40 mg, Oral, Early Morning, Yari Peñaloza PA-C, 40 mg at 02/29/24 0553    polyethylene glycol (MIRALAX) packet 17 g, 17 g, Oral, Daily, Yari Peñaloza PA-C, 17 g at 02/27/24 0839    pramipexole (MIRAPEX) tablet 1 mg, 1 mg, Oral, HS, Yari Peñaloza PA-C, 1 mg at 02/27/24 2255    senna-docusate sodium (SENOKOT S) 8.6-50 mg per tablet 1 tablet, 1 tablet, Oral, BID, Yari Peñaloza PA-C, 1 tablet at 02/27/24 1726    sodium chloride 3 % inhalation solution 4 mL, 4 mL, Nebulization, BID, Yari Peñaloza PA-C, 4 mL at 02/28/24 0751    temazepam (RESTORIL) capsule 15 mg, 15 mg, Oral, HS PRN, Yari Peñaloza PA-C    warfarin (COUMADIN) tablet 1 mg, 1 mg, Oral, Once (warfarin), Deangelo Matos PA-C    Invasive Devices:   Urethral Catheter Non-latex (Active)   Reasons to continue Urinary Catheter  Accurate I&O assessment in critically ill patients (48 hr. max) 02/29/24 0800   Goal for Removal Voiding trial when ambulation improves 02/29/24 0800   Site Assessment Clean;Skin intact 02/29/24 0800   Ledesma Care Done 02/28/24 2000   Collection Container Standard drainage bag 02/29/24 0800   Securement Method Securing device (Describe) 02/29/24 0800   Securing Device Change Date 03/06/24 02/28/24 1030   Output (mL) 125 mL 02/29/24 1000       Lab Results:   Results from  last 7 days   Lab Units 02/29/24  0426 02/29/24  0000 02/28/24  2344 02/28/24  1756 02/28/24  1403 02/28/24  0859 02/28/24  0859 02/28/24  0610 02/27/24  1439 02/27/24  0443 02/26/24  0351 02/25/24  1000 02/25/24  0317 02/23/24  0950 02/23/24  0407   WBC Thousand/uL 13.28*  --   --   --   --   --   --  13.40*  --  12.82* 12.87*  --  9.87   < >  --    HEMOGLOBIN g/dL 8.4*  --   --   --   --   --   --  8.9*  --  7.5* 8.0*  --  8.1*   < >  --    I STAT HEMOGLOBIN g/dl  --   --   --   --  9.9*  --  10.9*  --   --   --   --   --   --   --   --    HEMATOCRIT % 28.7*  --   --   --   --   --   --  30.1*  --  24.2* 26.0*  --  26.7*   < >  --    HEMATOCRIT, ISTAT %  --   --   --   --  29*  --  32*  --   --   --   --   --   --   --   --    PLATELETS Thousands/uL 301  --   --   --   --   --   --  305  --  247 220  --  169   < >  --    SODIUM mmol/L  --  132* 132* 133*  --   --   --   --    < > 133* 135   < > 137   < > 139   POTASSIUM mmol/L  --  4.4 4.8 5.3  --   --   --   --    < > 4.5 3.8   < > 3.7   < > 3.6   CHLORIDE mmol/L  --  85* 86* 90*  --   --   --   --    < > 87* 89*   < > 94*   < > 102   CO2 mmol/L  --  35* 33* 34*  --   --   --   --    < > 36* 35*   < > 31   < > 28   CO2, I-STAT mmol/L  --   --   --   --  38*   < > 35*  --   --   --   --   --   --   --   --    BUN mg/dL  --  114* 114* 113*  --   --   --   --    < > 96* 85*   < > 79*   < > 61*   CREATININE mg/dL  --  3.27* 3.37* 3.55*  --   --   --   --    < > 2.61* 2.21*   < > 2.17*   < > 2.28*   CALCIUM mg/dL  --  8.4 8.5 8.4  --   --   --   --    < > 8.8 8.5   < > 8.1*   < > 8.7   MAGNESIUM mg/dL  --  2.8*  --   --   --   --   --   --   --   --  2.3  --  2.4   < > 2.4   PHOSPHORUS mg/dL  --   --   --   --   --   --   --   --   --   --   --   --   --   --  3.7   GLUCOSE, ISTAT mg/dl  --   --   --   --  114  --  152*  --   --   --   --   --   --   --   --     < > = values in this interval not displayed.       Previous work up:  See previous notes      Portions of  "the record may have been created with voice recognition software. Occasional wrong word or \"sound a like\" substitutions may have occurred due to the inherent limitations of voice recognition software. Read the chart carefully and recognize, using context, where substitutions have occurred.If you have any questions, please contact the dictating provider.  "

## 2024-03-01 NOTE — PROGRESS NOTES
NEPHROLOGY PROGRESS NOTE   Natividad Ortiz 83 y.o. female MRN: [id number]  Unit/Bed#: [facility] 418-01 Encounter: [id number]      ASSESSMENT & PLAN:  #1 acute kidney injury in the setting of hemodynamic perturbation and hypotension, anemia.  Patient was started on Bumex drip, rate was increased to 2 mg/h.  Creatinine peaked at 3.55 on 2/28.  Kidney function improving creatinine down to 3.10  Good urine output on Bumex drip.  Monitor ins and outs and follow daily labs  Baseline creatinine 0.9-1.1 by    2 severe AS status post aortic valve replacement with pericardial tissue valve.  Postoperative management as per cardiac surgery team.  Status post right-sided thoracentesis on 2/28 with 950 cc fluid removed    3.  Acute blood loss anemia, status post blood transfusion, monitor H&H and recommend blood transfusion for hemoglobin less than 7, hemoglobin 8.5 this morning    4 acute hypercapnic respiratory failure, improving, continue diuresis, on oxygen via nasal cannula    #5 hemodynamic, pressure remains stable in the lower side, keep MAP over 65 mmHg    6 hyponatremia in the setting of volume overload and CHAYO, continue with diuresis, change fluid restriction to 1.5 L daily, follow serial labs    Recommendations was discussed with cardiac surgery team as above, they agree with the plan      SUBJECTIVE:  Seen and examined, feeling better, denies significant chest pain or shortness of breath, no abdominal pain, no nausea vomiting  Good urine on Bumex drip.    OBJECTIVE:  Current Weight: Weight - Scale: 106 kg (233 lb 14.5 oz)  Vitals:    03/01/24 0600   BP: 115/79   Pulse: 74   Resp: (!) 38   Temp: (!) 96.6 °F (35.9 °C)   SpO2: 94%       Intake/Output Summary (Last 24 hours) at 3/1/2024 0906  Last data filed at 3/1/2024 0858  Gross per 24 hour   Intake 1696.13 ml   Output 1320 ml   Net 376.13 ml       General: conscious, cooperative, in not acute distress  Eyes: conjunctivae pale, anicteric sclerae  ENT: lips and mucous  membranes moist, oxygen via nasal cannula  Neck: supple, no JVD  Chest: clear breath sounds bilateral, no crackles, ronchus or wheezings  CVS: distinct S1 & S2, normal rate, regular rhythm  Abdomen: soft, non-tender, non-distended, normoactive bowel sounds  Extremities: Positive edema of both legs  Skin: no rash  Neuro: awake, alert, oriented  Urinary Ledesma catheter in place        Medications:    Current Facility-Administered Medications:     acetaminophen (TYLENOL) rectal suppository 650 mg, 650 mg, Rectal, Q4H PRN, Yari Peñaloza PA-C    acetaminophen (TYLENOL) tablet 650 mg, 650 mg, Oral, Q6H While awake, Yari Peñaloza PA-C, 650 mg at 02/29/24 2125    amiodarone tablet 200 mg, 200 mg, Oral, Q8H ANTON, Yari Peñaloza PA-C, 200 mg at 03/01/24 0636    aspirin (ECOTRIN LOW STRENGTH) EC tablet 81 mg, 81 mg, Oral, Daily, Yari Peñaloza PA-C, 81 mg at 02/29/24 0802    atorvastatin (LIPITOR) tablet 80 mg, 80 mg, Oral, Daily With Dinner, Yari Peñaloza PA-C, 80 mg at 02/29/24 1604    bisacodyl (DULCOLAX) EC tablet 5 mg, 5 mg, Oral, Daily PRN, Yari Peñaloza PA-C    bisacodyl (DULCOLAX) rectal suppository 10 mg, 10 mg, Rectal, Daily PRN, Yari Peñaloza PA-C, 10 mg at 02/27/24 1104    bumetanide (BUMEX) 12.5 mg infusion 50 mL, 2 mg/hr, Intravenous, Continuous, Deangelo Matos PA-C, Last Rate: 8 mL/hr at 03/01/24 0443, 2 mg/hr at 03/01/24 0443    chlorhexidine (PERIDEX) 0.12 % oral rinse 15 mL, 15 mL, Mouth/Throat, Q12H ANTON, Deangelo Matos PA-C, 15 mL at 02/29/24 2126    ferrous sulfate tablet 325 mg, 325 mg, Oral, Daily With Breakfast, Yari Peñaloza PA-C, 325 mg at 02/29/24 0803    insulin lispro (HumaLOG) 100 units/mL subcutaneous injection 1-5 Units, 1-5 Units, Subcutaneous, TID AC, 1 Units at 02/28/24 1126 **AND** Fingerstick Glucose (POCT), , , TID AC, Yari Peñaloza PA-C    insulin lispro (HumaLOG) 100 units/mL subcutaneous injection 1-5 Units, 1-5 Units,  Subcutaneous, HS, Yari Peñaloza PA-C, 1 Units at 02/24/24 2141    levalbuterol (XOPENEX) inhalation solution 1.25 mg, 1.25 mg, Nebulization, BID, Yari Peñaloza PA-C, 1.25 mg at 03/01/24 0727    magnesium hydroxide (MILK OF MAGNESIA) oral suspension 30 mL, 30 mL, Oral, Daily PRN, Yari Peñaloza PA-C, 30 mL at 02/27/24 1726    metoclopramide (REGLAN) injection 10 mg, 10 mg, Intravenous, Q6H PRN, Yari Peñaloza PA-C    metolazone (ZAROXOLYN) tablet 10 mg, 10 mg, Oral, Q8H, Deangelo Matos PA-C    metoprolol tartrate (LOPRESSOR) tablet 25 mg, 25 mg, Oral, Q12H ANTON, Deangelo Matos PA-C, 25 mg at 02/29/24 2125    ondansetron (ZOFRAN) injection 4 mg, 4 mg, Intravenous, Q6H PRN, Yari Peñaloza PA-C    oxyCODONE (ROXICODONE) split tablet 2.5 mg, 2.5 mg, Oral, Q4H PRN **OR** oxyCODONE (ROXICODONE) IR tablet 5 mg, 5 mg, Oral, Q4H PRN, Yari Peñaloza PA-C, 5 mg at 02/22/24 1843    pantoprazole (PROTONIX) EC tablet 40 mg, 40 mg, Oral, Early Morning, Yari Peñaloza PA-C, 40 mg at 03/01/24 0636    polyethylene glycol (MIRALAX) packet 17 g, 17 g, Oral, Daily, Yari Peñaloza PA-C, 17 g at 02/27/24 0839    pramipexole (MIRAPEX) tablet 1 mg, 1 mg, Oral, HS, Yari Peñaloza PA-C, 1 mg at 02/27/24 2255    senna-docusate sodium (SENOKOT S) 8.6-50 mg per tablet 1 tablet, 1 tablet, Oral, BID, Yari Peñaloza PA-C, 1 tablet at 02/27/24 1726    sodium chloride 3 % inhalation solution 4 mL, 4 mL, Nebulization, BID, Yari Peñaloza PA-C, 4 mL at 03/01/24 0727    temazepam (RESTORIL) capsule 15 mg, 15 mg, Oral, HS PRN, Yari Peñaloza PA-C    Invasive Devices:   Urethral Catheter Non-latex (Active)   Reasons to continue Urinary Catheter  Accurate I&O assessment in critically ill patients (48 hr. max) 02/29/24 0800   Goal for Removal Voiding trial when ambulation improves 02/29/24 0800   Site Assessment Clean;Skin intact 02/29/24 0800   Ledesma Care Done 02/28/24 2000   Collection  "Container Standard drainage bag 02/29/24 0800   Securement Method Securing device (Describe) 02/29/24 0800   Securing Device Change Date 03/06/24 02/28/24 1030   Output (mL) 125 mL 02/29/24 1000       Lab Results:   Results from last 7 days   Lab Units 03/01/24  0422 02/29/24  2324 02/29/24  1638 02/29/24  0426 02/29/24  0000 02/28/24  1756 02/28/24  1403 02/28/24  0859 02/28/24  0859 02/28/24  0610 02/27/24  0443 02/26/24  0351   WBC Thousand/uL 10.97*  --   --  13.28*  --   --   --   --   --  13.40*   < > 12.87*   HEMOGLOBIN g/dL 8.5*  --   --  8.4*  --   --   --   --   --  8.9*   < > 8.0*   I STAT HEMOGLOBIN g/dl  --   --   --   --   --   --  9.9*   < > 10.9*  --   --   --    HEMATOCRIT % 27.6*  --   --  28.7*  --   --   --   --   --  30.1*   < > 26.0*   HEMATOCRIT, ISTAT %  --   --   --   --   --   --  29*   < > 32*  --   --   --    PLATELETS Thousands/uL 307  --   --  301  --   --   --   --   --  305   < > 220   SODIUM mmol/L 127* 127* 129*  --  132*   < >  --   --   --   --    < > 135   POTASSIUM mmol/L 4.0 4.1 4.5  --  4.4   < >  --   --   --   --    < > 3.8   CHLORIDE mmol/L 81* 80* 82*  --  85*   < >  --   --   --   --    < > 89*   CO2 mmol/L 30 31 32  --  35*   < >  --   --   --   --    < > 35*   CO2, I-STAT mmol/L  --   --   --   --   --   --  38*   < > 35*  --   --   --    BUN mg/dL 126* 122* 121*  --  114*   < >  --   --   --   --    < > 85*   CREATININE mg/dL 3.10* 3.24* 3.24*  --  3.27*   < >  --   --   --   --    < > 2.21*   CALCIUM mg/dL 8.7 8.7 8.6  --  8.4   < >  --   --   --   --    < > 8.5   MAGNESIUM mg/dL 3.0*  --   --   --  2.8*  --   --   --   --   --   --  2.3   GLUCOSE, ISTAT mg/dl  --   --   --   --   --   --  114  --  152*  --   --   --     < > = values in this interval not displayed.       Previous work up:  See previous notes      Portions of the record may have been created with voice recognition software. Occasional wrong word or \"sound a like\" substitutions may have occurred due to " the inherent limitations of voice recognition software. Read the chart carefully and recognize, using context, where substitutions have occurred.If you have any questions, please contact the dictating provider.

## 2024-03-01 NOTE — PHYSICAL THERAPY NOTE
PHYSICAL THERAPY NOTE          Patient Name: Natividad Ortiz  Today's Date: 3/1/2024         03/01/24 1446   PT Last Visit   PT Visit Date 03/01/24   Note Type   Note Type Treatment   Pain Assessment   Pain Assessment Tool 0-10   Pain Score No Pain   Restrictions/Precautions   Other Precautions Cardiac/sternal;Multiple lines;Telemetry;O2;Fall Risk   General   Chart Reviewed Yes   Additional Pertinent History cleared for Tx session by nsg   Response to Previous Treatment Patient with no complaints from previous session.   Cognition   Overall Cognitive Status WFL   Arousal/Participation Alert;Cooperative   Attention Attends with cues to redirect   Orientation Level Oriented to person;Oriented to place;Oriented to situation   Memory Decreased recall of precautions   Following Commands Follows one step commands without difficulty   Subjective   Subjective Alert; in the chair; agreeable to try mobilization   Transfers   Sit to Stand 2  Maximal assistance   Additional items Assist x 2;Increased time required;Verbal cues  (4 trials total)   Stand to Sit 2  Maximal assistance   Additional items Assist x 2;Increased time required;Verbal cues  (4 trials)   Additional Comments Upon standing up, noted small amount of residual stool on the pad; pericare activities initiated w/ staff   Ambulation/Elevation   Gait pattern Not appropriate;Not tested   Balance   Static Sitting Fair -   Dynamic Sitting Poor +   Static Standing Poor -   Dynamic Standing Poor -   Activity Tolerance   Activity Tolerance Patient limited by fatigue   Nurse Made Aware spoke to NAJMA Montes and NAJMA Avina   Exercises   Balance training  Standing balance/tolerance training x 10 sec, x 15 sec, x 45 sec and x 60 sec w/ extended seated rest periods in between   Assessment   Prognosis Guarded   Problem List Decreased strength;Decreased endurance;Impaired balance;Decreased  mobility;Obesity   Assessment Pt was seen in a critical care unit after being transferred here on 2/28 due to hypercarbia/resp failure and pleural effusions; pt demonstrates increased weakness in the LE and difficulty w/ transfers requiring max (A)x2 for sit <--> stand transitions and standing during extended pericare and chair pad changes post bouts of BM; remained in NAD; overall, cont to recommend rehab upon D/C when medically cleared; will follow   Goals   Patient Goals to get better   LTG Expiration Date 03/08/24   PT Treatment Day 3   Plan   Treatment/Interventions Functional transfer training;LE strengthening/ROM;Therapeutic exercise;Endurance training;Bed mobility;Gait training;Spoke to nursing;Spoke to case management   Progress Slow progress, decreased activity tolerance   PT Frequency 3-5x/wk   Discharge Recommendation   Rehab Resource Intensity Level, PT I (Maximum Resource Intensity)   Equipment Recommended Walker   Walker Package Recommended Wheeled walker   Change/add to Walker Package? No   AM-PAC Basic Mobility Inpatient   Turning in Flat Bed Without Bedrails 1   Lying on Back to Sitting on Edge of Flat Bed Without Bedrails 1   Moving Bed to Chair 1   Standing Up From Chair Using Arms 1   Walk in Room 1   Climb 3-5 Stairs With Railing 1   Basic Mobility Inpatient Raw Score 6   Turning Head Towards Sound 4   Follow Simple Instructions 4   Low Function Basic Mobility Raw Score  14   Low Function Basic Mobility Standardized Score  22.01   Highest Level Of Mobility   JH-HLM Goal 2: Bed activities/Dependent transfer   JH-HLM Achieved 2: Bed activities/Dependent transfer   Education   Education Provided Mobility training   Patient Demonstrates verbal understanding;Reinforcement needed   End of Consult   Patient Position at End of Consult Bedside chair;All needs within reach     Dimitry Bocanegra

## 2024-03-01 NOTE — SPEECH THERAPY NOTE
Speech Language/Pathology  Attempted f/u for upgrade to regular.  The pt states she has a fair appetite and prefers the soft material at this time.  She did not desire an upgrade.  Continue Level 3w/ thin. Will follow next week as able

## 2024-03-01 NOTE — PROGRESS NOTES
Progress Note - Cardiothoracic Surgery   Natividad Ortiz 83 y.o. female MRN: [id number]  Unit/Bed#: [facility] 418-01 Encounter: [id number]      POD # 11 s/p AVR, TAVR explant.    Pt seen/examined.  Interval history and data reviewed with critical care team.  Making adequate urine with bumex drip/metolazone. Cr down from 3.25 to 3.1. In better spirits today.         Medications:   Scheduled Meds:  Current Facility-Administered Medications   Medication Dose Route Frequency Provider Last Rate    acetaminophen  650 mg Rectal Q4H PRN Yari Peñaloza PA-C      acetaminophen  650 mg Oral Q6H While awake Yari Peñaloza PA-C      amiodarone  200 mg Oral Q8H [facility] Yari Peñaloza PA-C      aspirin  81 mg Oral Daily Yari Peñaloza PA-C      atorvastatin  80 mg Oral Daily With Dinner Yari Peñaloza PA-C      bisacodyl  5 mg Oral Daily PRN Yari Peñaloza PA-C      bisacodyl  10 mg Rectal Daily PRN Yari Peñaloza PA-C      bumetanide (BUMEX) 12.5 mg infusion 50 mL  2 mg/hr Intravenous Continuous Deangelo Matos PA-C 2 mg/hr (03/01/24 3093)    chlorhexidine  15 mL Mouth/Throat Q12H [facility] Deangelo Matos PA-C      ferrous sulfate  325 mg Oral Daily With Breakfast Yari Peñaloza PA-C      insulin lispro  1-5 Units Subcutaneous TID AC Yari Peñaloza PA-C      insulin lispro  1-5 Units Subcutaneous HS Yari Peñaloza PA-C      levalbuterol  1.25 mg Nebulization BID Yari Peñaloza PA-C      magnesium hydroxide  30 mL Oral Daily PRN Yari Peñaloza PA-C      metoclopramide  10 mg Intravenous Q6H PRN Yari Peñaloza PA-C      metolazone  10 mg Oral Q8H Deangelo Matos PA-C      metoprolol tartrate  25 mg Oral Q12H [facility] Deangelo Matos PA-C      ondansetron  4 mg Intravenous Q6H PRN Yari Peñaloza PA-C      oxyCODONE  2.5 mg Oral Q4H PRN Yari Peñaloza PA-C      Or    oxyCODONE  5 mg Oral Q4H PRN Yari Peñaloza PA-C      pantoprazole  40 mg Oral Early  "Morning Yari Peñaloza PA-C      polyethylene glycol  17 g Oral Daily Yari Peñaloza PA-C      pramipexole  1 mg Oral HS Yari Peñaloza PA-C      senna-docusate sodium  1 tablet Oral BID Yari Peñaloza PA-C      sodium chloride  4 mL Nebulization BID Yari Peñaloza PA-C      temazepam  15 mg Oral HS PRN Yari Peñaloza PA-C       Continuous Infusions:bumetanide (BUMEX) 12.5 mg infusion 50 mL, 2 mg/hr, Last Rate: 2 mg/hr (03/01/24 6403)      PRN Meds:.  acetaminophen    bisacodyl    bisacodyl    magnesium hydroxide    metoclopramide    ondansetron    oxyCODONE **OR** oxyCODONE    temazepam    Vitals: Blood pressure 115/79, pulse 74, temperature (!) 96.6 °F (35.9 °C), temperature source Rectal, resp. rate (!) 38, height 5' 4\" (1.626 m), weight 106 kg (233 lb 14.5 oz), SpO2 94%.,Body mass index is 40.15 kg/m².  I/O last 24 hours:  In: 2192.1 [P.O.:1900; I.V.:192.1; IV Piggyback:100]  Out: 1470 [Urine:1470]  Invasive Devices       Peripheral Intravenous Line  Duration             Peripheral IV 02/27/24 Right;Ventral (anterior) Forearm 2 days    Peripheral IV 02/28/24 Proximal;Right;Ventral (anterior) Forearm 1 day              Drain  Duration             Urethral Catheter Non-latex 1 day                      Lab, Imaging and other studies:   Results from last 7 days   Lab Units 03/01/24  0422 02/29/24  0426 02/28/24  1403 02/28/24  0859 02/28/24  0610   WBC Thousand/uL 10.97* 13.28*  --   --  13.40*   HEMOGLOBIN g/dL 8.5* 8.4*  --   --  8.9*   I STAT HEMOGLOBIN g/dl  --   --  9.9*   < >  --    HEMATOCRIT % 27.6* 28.7*  --   --  30.1*   HEMATOCRIT, ISTAT %  --   --  29*   < >  --    PLATELETS Thousands/uL 307 301  --   --  305    < > = values in this interval not displayed.     Results from last 7 days   Lab Units 03/01/24  0422 02/29/24  2324 02/29/24  1638 02/28/24  1756 02/28/24  1403   POTASSIUM mmol/L 4.0 4.1 4.5   < >  --    CHLORIDE mmol/L 81* 80* 82*   < >  --    CO2 mmol/L 30 31 32   " "< >  --    CO2, I-STAT mmol/L  --   --   --   --  38*   BUN mg/dL 126* 122* 121*   < >  --    CREATININE mg/dL 3.10* 3.24* 3.24*   < >  --    GLUCOSE, ISTAT mg/dl  --   --   --   --  114   CALCIUM mg/dL 8.7 8.7 8.6   < >  --     < > = values in this interval not displayed.     Results from last 7 days   Lab Units 03/01/24  0422 02/29/24  0000 02/28/24  0848   INR  2.91* 2.96* 2.35*     No results for input(s): \"PHART\", \"OWX0JCZ\", \"PO2ART\", \"SOT5LZA\", \"G9VVUYQD\", \"BEART\" in the last 72 hours.        Plan:    Continue bumex for goal negative.   Cr peaked at 3.5, now downtrending.  Aggressive chest PT, IS  PO ASA/Statin/B blocker.  Holding coumadin tonight for INR 3.        SIGNATURE: Davian Arenas MD  DATE: March 1, 2024  TIME: 8:05 AM   "

## 2024-03-01 NOTE — PLAN OF CARE
Problem: PHYSICAL THERAPY ADULT  Goal: Performs mobility at highest level of function for planned discharge setting.  See evaluation for individualized goals.  Description: Treatment/Interventions: Functional transfer training, LE strengthening/ROM, Therapeutic exercise, Endurance training, Bed mobility, Gait training, Spoke to nursing, OT          See flowsheet documentation for full assessment, interventions and recommendations.  3/1/2024 1712 by Dimitry Bocanegra PT  Outcome: Progressing  Note: Prognosis: Guarded  Problem List: Decreased strength, Decreased endurance, Impaired balance, Decreased mobility, Obesity  Assessment: Pt was seen in a critical care unit after being transferred here on 2/28 due to hypercarbia/resp failure and pleural effusions; pt demonstrates increased weakness in the LE and difficulty w/ transfers requiring max (A)x2 for sit <--> stand transitions and standing during extended pericare and chair pad changes post bouts of BM; remained in NAD; overall, cont to recommend rehab upon D/C when medically cleared; will follow  Barriers to Discharge: Inaccessible home environment     Rehab Resource Intensity Level, PT: I (Maximum Resource Intensity)    See flowsheet documentation for full assessment.

## 2024-03-01 NOTE — PROGRESS NOTES
Spiritual Care Progress Note    3/1/2024  Patient: Natividad Ortiz : 1940  Admission Date & Time: 2024 0516  MRN: [id number] CSN: [id number]      Fr Bennett met with the pt and provided prayers and blessings. No further needs were expressed at this time.    Chaplains still remain available.       24 1000   Clinical Encounter Type   Visited With Patient   Protestant Encounters   Protestant Needs Prayer

## 2024-03-01 NOTE — PROGRESS NOTES
[facility]  Progress Note: Critical Care   Date of Service: 3/1/2024  Hospital Day: 11  Name: Natividad Ortiz I  MRN: [id number]  [facility] 418-01      Assessment/Plan     Impressions:  Acute hypoxic and hypercapnic respiratory failure  Acute kidney injury  Severe AS s/p aortic valve replacement with pericardial tissue valve  ABLA  Paroxysmal atrial fibrillation on chronic anticoagulation  Sick sinus syndrome s/p PPM  Chronic diastolic CHF  GERD  Carrero's esophagus  CKD 3  Lung nodules  Venous insufficiency  Left breast CA s/p lumpectomy/radiation    Neuro:   Cardiac Surgery Pain Control: ATC tylenol and PRN oxycodone 2.5/5mg  Delirium precautions  Regulate sleep/wake cycle  CAM-ICU daily  Trend neuro exam      CV:   Cardiac Surgery Hemodynamic Monitoring: MAP goal >65  Follow rhythm on telemetry  Critical Care Infusions : No cardiac infusions  Beta Blocker Plan: Continue current lopressor dosage (25mg BID)  Epicardial pacing wire plan : Epicardial wires not in place  Post op cardiac surgery medications:   ASA 81 mg QD  Statin: Lipitor 80 mg qHS  Amiodarone 200 mg PO q8 hours  Coumadin dosing? (AM INR 2.91, received 1mg last night)    Lung:   Acute respiratory failure requiring 6L NC. Secondary to poor inspiratory effort secondary to pain, pulmonary vascular congestion, atelectasis, pleural effusion, and body habitus/obesity  Continue incentive spirometry/coughing/deep breathing exercises.   Chest tubes have been discontinued    GI:   Continue PPI for stress ulcer prophylaxis  Continue bowel regimen  Trend abdominal exam and bowel function    FEN:   Diuresis Plan: Bumex infusion 2 mg/hr.  Check Q6 BMPs  Metolazone 10mg TID  Nutrition plan: as tolerated  Replenish electrolytes with goals: K >4.0, Mag >2.0, and Phos >3.0    :   Ledesma Plan: Continue for accurate I/O monitoring for 48 hours  Trend UOP and BUN/creat  Strict I and O     ID:   Trend temps and WBC  count  Maintain normothermia     Heme:   Trend hgb and plts    Endo:   SSI    MSK/Skin:  Mobility goal:   PT/OT consult as medically appropriate   Frequent turning and pressure off-loading  Local wound care as needed  Forced warm air blanket for hypothermia    Disposition:  Stepdown Level 1    ICU Core Measures     A: Assess, Prevent, and Manage Pain Has pain been assessed? Yes  Need for changes to pain regimen? No   B: Both SAT/SAT  N/A   C: Choice of Sedation RASS Goal: N/A patient not on sedation  Need for changes to sedation or analgesia regimen? No   D: Delirium CAM-ICU: Negative   E: Early Mobility  Plan for early mobility? Yes   F: Family Engagement Plan for family engagement today? Yes       Review of Invasive Devices:    Baltazar Plan: Continue for accurate I/O monitoring for 48 hours        Prophylaxis:  VTE VTE covered by:    None       Stress Ulcer  covered byfamotidine (PEPCID) 40 MG tablet [[id number]] (Long-Term Med), pantoprazole (PROTONIX) 20 mg tablet [[id number]] (Long-Term Med), pantoprazole (PROTONIX) EC tablet 40 mg [[id number]]          Significant 24hr Events      24 Hour Events/HPI: POD # 11 s/p AVR. Continues on Bumex gtt. Weaned to regular NC. Wore BiPAP overnight.      Subjective   Review of Systems   All other systems reviewed and are negative.       Objective     Medications:  Scheduled Continuous   acetaminophen, 650 mg, Q6H While awake  amiodarone, 200 mg, Q8H ANTON  aspirin, 81 mg, Daily  atorvastatin, 80 mg, Daily With Dinner  chlorhexidine, 15 mL, Q12H ANTON  ferrous sulfate, 325 mg, Daily With Breakfast  insulin lispro, 1-5 Units, TID AC  insulin lispro, 1-5 Units, HS  levalbuterol, 1.25 mg, BID  metolazone, 10 mg, Q8H  metoprolol tartrate, 25 mg, Q12H ANTON  pantoprazole, 40 mg, Early Morning  polyethylene glycol, 17 g, Daily  pramipexole, 1 mg, HS  senna-docusate sodium, 1 tablet, BID  sodium chloride, 4 mL, BID      bumetanide (BUMEX) 12.5 mg infusion 50 mL, 2 mg/hr, Last Rate: 2 mg/hr  (03/01/24 7883)         Vitals: Invasive Monitoring       Most Recent Min/Max in 24hrs   Temp (!) 96.6 °F (35.9 °C) Temp  Min: 96.1 °F (35.6 °C)  Max: 98 °F (36.7 °C)   Pulse 74 Pulse  Min: 69  Max: 106   Resp (!) 38 Resp  Min: 19  Max: 52   /79 BP  Min: 88/52  Max: 148/62   O2 Sat 94 % SpO2  Min: 92 %  Max: 100 %   O2 Device: Nasal cannula  Nasal Cannula O2 Flow Rate (L/min): 4 L/min    I/O Chest tube output (if present)     Intake/Output Summary (Last 24 hours) at 3/1/2024 0645  Last data filed at 3/1/2024 0601  Gross per 24 hour   Intake 1952.13 ml   Output 1470 ml   Net 482.13 ml     UOP: 0.58cc/kg/hour    BM: 2 in the last 24 hours  Weight change: -2.9 kg (-6 lb 6.3 oz)     No chest tubes in place       Physical Exam   Physical Exam  Skin:     General: Skin is cool and dry.      Coloration: Skin is not pale.   Cardiovascular:      Rate and Rhythm: Normal rate. Rhythm irregular.   Abdominal: General: There is no distension.      Palpations: Abdomen is soft.   Constitutional:       General: She is not in acute distress.     Appearance: She is ill-appearing.   Pulmonary:      Effort: No respiratory distress.      Breath sounds: Normal breath sounds.   Neurological:      General: No focal deficit present.      Mental Status: She is alert and oriented to person, place and time.   Genitourinary/Anorectal:  Ledesma present.        Diagnostic Studies      03/01/24 CXR: No new CXR today.   This was personally reviewed by myself in PACS.  03/01/24 EKG: AF rate 70s.   This was personally reviewed by myself.         Labs:  CBC    Recent Labs     02/29/24  0426 03/01/24  0422   WBC 13.28* 10.97*   HGB 8.4* 8.5*   HCT 28.7* 27.6*    307     BMP    Recent Labs     02/29/24  2324 03/01/24  0422   SODIUM 127* 127*   K 4.1 4.0   CL 80* 81*   CO2 31 30   AGAP 16 16   * 126*   CREATININE 3.24* 3.10*   CALCIUM 8.7 8.7        Baseline Creat: 1.0   Coags    Recent Labs     02/29/24  0000 03/01/24  0422   INR 2.96*  2.91*              Additional Electrolytes  Recent Labs     02/28/24  0859 02/28/24  1403 02/29/24  0000 03/01/24  0422   MG  --   --  2.8* 3.0*   CAIONIZED 1.10* 1.12  --   --           Blood Gas    No recent results  Recent Labs     02/29/24  0407   PHVEN 7.278*   YMM9BBP 73.5*   PO2VEN 25.4*   PXA5UKE 33.6*   BEVEN 5.4   V2SYQVH 40.5*    LFTs  No recent results    Infectious    No recent results     Recent Labs     02/28/24  1559   GRAMSTAIN 3+ Polys  No bacteria seen   BODYFLUIDCUL No growth    Glucose  Recent Labs     02/29/24  0000 02/29/24  1638 02/29/24  2324 03/01/24  0422   GLUC 90 106 107 97        Collaborative bedside rounds performed with cardiac surgery attending, critical care attending and bedside RN.     Deangelo Matos PA-C

## 2024-03-02 NOTE — PROGRESS NOTES
NEPHROLOGY PROGRESS NOTE   Natividad Ortiz 83 y.o. female MRN: [id number]  Unit/Bed#: [facility] 418-01 Encounter: [id number]      ASSESSMENT & PLAN:  #1 acute kidney injury in the setting of hemodynamic perturbation and hypotension, anemia.  Creatinine peaked at 3.55 on 2/28.  Kidney function is stable on current regimen with creatinine down to 2.9, acceptable urine output on Bumex drip.  Noted azotemia in the setting of diuresis  Continue current regimen, cautious with metolazone use given hyponatremia  Gentle fluid restriction and avoid relative hypotension  Monitor ins and outs and follow daily labs  Baseline creatinine 0.9-1.1    2 severe AS status post aortic valve replacement with pericardial tissue valve.  Postoperative management as per cardiac surgery team.  Status post right-sided thoracentesis on 2/28 with 950 cc fluid removed    3.  Acute blood loss anemia, status post blood transfusion, monitor H&H and recommend blood transfusion for hemoglobin less than 7, hemoglobin 8.5 this morning    4 acute hypercapnic respiratory failure, improving, continue diuresis, on oxygen via nasal cannula    #5 hemodynamic, blood pressure is stable, keep MAP over 65 mmHg    6 hyponatremia in the setting of volume overload and CHAYO, continue with diuresis, restriction was changed to 1.5 L daily, cautious with metolazone use, follow serial labs    Recommendation was discussed with critical care team, continue current regimen, continue fluid restriction, cautious with metolazone given hyponatremia, monitor ins and outs and avoid relative hypotension, they agree with the plan      SUBJECTIVE:  Seen and examined, feeling better every day, denies significant chest pain or shortness of breath, no abdominal pain, no nausea or vomiting.  Good urine output on Bumex drip    OBJECTIVE:  Current Weight: Weight - Scale: 106 kg (233 lb 0.4 oz)  Vitals:    03/02/24 0900   BP: 114/58   Pulse: 64   Resp: 19   Temp:    SpO2: 95%        Intake/Output Summary (Last 24 hours) at 3/2/2024 1004  Last data filed at 3/2/2024 0800  Gross per 24 hour   Intake 1824.27 ml   Output 2305 ml   Net -480.73 ml       General: conscious, cooperative, in not acute distress  Eyes: conjunctivae pale, anicteric sclerae  ENT: lips and mucous membranes moist, oxygen via nasal cannula  Neck: supple, no JVD  Chest: clear breath sounds bilateral, no crackles, ronchus or wheezings  CVS: distinct S1 & S2, normal rate, regular rhythm  Abdomen: soft, non-tender, non-distended, normoactive bowel sounds  Extremities: Positive edema of both legs  Skin: no rash  Neuro: awake, alert, oriented  Urinary Ledesma catheter in place        Medications:    Current Facility-Administered Medications:     acetaminophen (TYLENOL) rectal suppository 650 mg, 650 mg, Rectal, Q4H PRN, Yari Peñaloza PA-C    acetaminophen (TYLENOL) tablet 650 mg, 650 mg, Oral, Q6H While awake, Yari Peñaloza PA-C, 650 mg at 03/02/24 0855    amiodarone tablet 200 mg, 200 mg, Oral, Q8H ANTON, Yari Peñaloza PA-C, 200 mg at 03/02/24 0559    aspirin (ECOTRIN LOW STRENGTH) EC tablet 81 mg, 81 mg, Oral, Daily, Yari Peñaloza PA-C, 81 mg at 03/02/24 0855    atorvastatin (LIPITOR) tablet 80 mg, 80 mg, Oral, Daily With Dinner, Yari Peñaloza PA-C, 80 mg at 03/01/24 1619    bisacodyl (DULCOLAX) EC tablet 5 mg, 5 mg, Oral, Daily PRN, Yari Peñaloza PA-C    bisacodyl (DULCOLAX) rectal suppository 10 mg, 10 mg, Rectal, Daily PRN, Yari Peñaloza PA-C, 10 mg at 02/27/24 1104    bumetanide (BUMEX) 12.5 mg infusion 50 mL, 2 mg/hr, Intravenous, Continuous, Deangelo Matos PA-C, Last Rate: 8 mL/hr at 03/02/24 0857, 2 mg/hr at 03/02/24 0857    chlorhexidine (PERIDEX) 0.12 % oral rinse 15 mL, 15 mL, Mouth/Throat, Q12H ANTON, Deangelo Matos PA-C, 15 mL at 03/02/24 0856    ferrous sulfate tablet 325 mg, 325 mg, Oral, Daily With Breakfast, Yari Peñaloza PA-C, 325 mg at 03/02/24 0855     insulin lispro (HumaLOG) 100 units/mL subcutaneous injection 1-5 Units, 1-5 Units, Subcutaneous, TID AC, 1 Units at 03/02/24 0600 **AND** Fingerstick Glucose (POCT), , , TID AC, Yari Peñaloza PA-C    insulin lispro (HumaLOG) 100 units/mL subcutaneous injection 1-5 Units, 1-5 Units, Subcutaneous, HS, Yari Peñaloza PA-C, 1 Units at 02/24/24 2141    levalbuterol (XOPENEX) inhalation solution 1.25 mg, 1.25 mg, Nebulization, BID, Yari Peñaloza PA-C, 1.25 mg at 03/02/24 0729    magnesium hydroxide (MILK OF MAGNESIA) oral suspension 30 mL, 30 mL, Oral, Daily PRN, Yari Peñaloza PA-C, 30 mL at 02/27/24 1726    metoclopramide (REGLAN) injection 10 mg, 10 mg, Intravenous, Q6H PRN, aYri Peñaloza PA-C    metolazone (ZAROXOLYN) tablet 10 mg, 10 mg, Oral, Q8H, Deangelo Matos PA-C, 10 mg at 03/02/24 0559    metoprolol tartrate (LOPRESSOR) tablet 25 mg, 25 mg, Oral, Q12H ANTON, Deangelo Matos PA-C, 25 mg at 03/02/24 0855    nystatin (MYCOSTATIN) powder, , Topical, BID, Bernice Mclain PA-C    ondansetron (ZOFRAN) injection 4 mg, 4 mg, Intravenous, Q6H PRN, Yari Peñaloza PA-C    oxyCODONE (ROXICODONE) split tablet 2.5 mg, 2.5 mg, Oral, Q4H PRN **OR** oxyCODONE (ROXICODONE) IR tablet 5 mg, 5 mg, Oral, Q4H PRN, Yari Peñaloza PA-C, 5 mg at 02/22/24 1843    pantoprazole (PROTONIX) EC tablet 40 mg, 40 mg, Oral, Early Morning, Yari Peñaloza PA-C, 40 mg at 03/02/24 0559    polyethylene glycol (MIRALAX) packet 17 g, 17 g, Oral, Daily, Yari Peñaloza PA-C, 17 g at 02/27/24 0839    pramipexole (MIRAPEX) tablet 1 mg, 1 mg, Oral, HS, Yari Peñaloza PA-C, 1 mg at 02/27/24 2255    senna-docusate sodium (SENOKOT S) 8.6-50 mg per tablet 1 tablet, 1 tablet, Oral, BID, Yari ePñaloza PA-C, 1 tablet at 02/27/24 1726    sodium chloride 3 % inhalation solution 4 mL, 4 mL, Nebulization, BID, Yari Peñaloza PA-C, 4 mL at 03/01/24 2015    temazepam (RESTORIL) capsule 15 mg, 15 mg,  Tenzin, PARKER PRJIMY, Yari Peñaloza PA-C    Invasive Devices:   Urethral Catheter Non-latex (Active)   Reasons to continue Urinary Catheter  Accurate I&O assessment in critically ill patients (48 hr. max) 02/29/24 0800   Goal for Removal Voiding trial when ambulation improves 02/29/24 0800   Site Assessment Clean;Skin intact 02/29/24 0800   Ledesma Care Done 02/28/24 2000   Collection Container Standard drainage bag 02/29/24 0800   Securement Method Securing device (Describe) 02/29/24 0800   Securing Device Change Date 03/06/24 02/28/24 1030   Output (mL) 125 mL 02/29/24 1000       Lab Results:   Results from last 7 days   Lab Units 03/02/24  0444 03/01/24  2147 03/01/24  1801 03/01/24  1152 03/01/24  0422 02/29/24  1638 02/29/24  0426 02/29/24  0000 02/28/24  1756 02/28/24  1403 02/28/24  0859   WBC Thousand/uL 11.06*  --   --   --  10.97*  --  13.28*  --   --   --   --    HEMOGLOBIN g/dL 8.7*  --   --   --  8.5*  --  8.4*  --   --   --   --    I STAT HEMOGLOBIN g/dl  --   --   --   --   --   --   --   --   --  9.9* 10.9*   HEMATOCRIT % 28.4*  --   --   --  27.6*  --  28.7*  --   --   --   --    HEMATOCRIT, ISTAT %  --   --   --   --   --   --   --   --   --  29* 32*   PLATELETS Thousands/uL 319  --   --   --  307  --  301  --   --   --   --    SODIUM mmol/L 125* 126* 126*   < > 127*   < >  --  132*   < >  --   --    POTASSIUM mmol/L 3.6 3.3* 3.6   < > 4.0   < >  --  4.4   < >  --   --    CHLORIDE mmol/L 79* 79* 78*   < > 81*   < >  --  85*   < >  --   --    CO2 mmol/L 32 32 31   < > 30   < >  --  35*   < >  --   --    CO2, I-STAT mmol/L  --   --   --   --   --   --   --   --   --  38* 35*   BUN mg/dL 136* 134* 135*   < > 126*   < >  --  114*   < >  --   --    CREATININE mg/dL 2.90* 3.03* 3.01*   < > 3.10*   < >  --  3.27*   < >  --   --    CALCIUM mg/dL 8.9 8.4 8.7   < > 8.7   < >  --  8.4   < >  --   --    MAGNESIUM mg/dL 2.8*  --   --   --  3.0*  --   --  2.8*  --   --   --    GLUCOSE, ISTAT mg/dl  --   --   --   --  "  --   --   --   --   --  114 152*    < > = values in this interval not displayed.       Previous work up:  See previous notes      Portions of the record may have been created with voice recognition software. Occasional wrong word or \"sound a like\" substitutions may have occurred due to the inherent limitations of voice recognition software. Read the chart carefully and recognize, using context, where substitutions have occurred.If you have any questions, please contact the dictating provider.  "

## 2024-03-02 NOTE — PROGRESS NOTES
[facility]  Progress Note: Critical Care   Date of Service: 3/2/2024  Hospital Day: 12  Name: Natividad Ortiz I  MRN: [id number]  [facility]      Assessment/Plan     Impressions:  Severe AS s/p tissue AVR  CHAYO on CKD3  Acute hypoxic and hypercarbic respiratory failure  Acute blood loss anemia  Chronic afib anticoagulated on coumadin  SSS s/p PPM  Chronic diastolic heart failure  GERD  Carrero's esophagus  Left breast cancer s/p lumpectomy/radiation     Neuro:   Cardiac Surgery Pain Control: ATC tylenol and PRN oxycodone 2.5/5mg  Delirium precautions  Regulate sleep/wake cycle  CAM-ICU daily  Trend neuro exam      CV:   Cardiac Surgery Hemodynamic Monitoring: MAP goal >65  Follow rhythm on telemetry  Critical Care Infusions : No cardiac infusions  Beta Blocker Plan: Continue current lopressor dosage - Metoprolol 25 mg BID  Epicardial pacing wire plan : Epicardial wires not in place  Post op cardiac surgery medications:   ASA 81 mg QD  Statin: Lipitor 80 mg qHS  Amiodarone 200 mg PO q8 hours   Dose coumadin 1 mg tonight, repeat INR in AM     Lung:   Acute post-op pulmonary insufficiency requiring 1 liters/min via nasal cannula. Secondary to pulmonary vascular congestion  BiPAP qHS  Chest tubes have been discontinued  Xopenex BID    GI:   Continue PPI for stress ulcer prophylaxis  Continue bowel regimen  Trend abdominal exam and bowel function    FEN:   Diuresis Plan: Bumex infusion 2 mg/hr.  Check Q6 BMPs and Metolazone 10mg q8h  Nephrology following, appreciate their recommendations  Nutrition plan: as tolerated  Replenish electrolytes with goals: K >4.0, Mag >2.0, and Phos >3.0    :   Ledesma Plan: Continue for accurate I/O monitoring for 48 hours  Trend UOP and BUN/creat  Strict I and O     ID:   Trend temps and WBC count  Maintain normothermia    Heme:   Trend hgb and plts  Continue iron supplementation    Endo:   SSI coverage with QID  accuchecks    MSK/Skin:  Mobility goal:   PT/OT consult as medically appropriate   Frequent turning and pressure off-loading  Local wound care as needed    Disposition:  Stepdown Level 1    ICU Core Measures     A: Assess, Prevent, and Manage Pain Has pain been assessed? Yes  Need for changes to pain regimen? No   B: Both SAT/SAT  N/A   C: Choice of Sedation RASS Goal: N/A patient not on sedation  Need for changes to sedation or analgesia regimen? No   D: Delirium CAM-ICU: Negative   E: Early Mobility  Plan for early mobility? Yes   F: Family Engagement Plan for family engagement today? Yes       Review of Invasive Devices:    Ledesma Plan: Continue for accurate I/O monitoring for 48 hours        Prophylaxis:  VTE VTE covered by:    None       Stress Ulcer  covered byfamotidine (PEPCID) 40 MG tablet [[id number]] (Long-Term Med), pantoprazole (PROTONIX) 20 mg tablet [[id number]] (Long-Term Med), pantoprazole (PROTONIX) EC tablet 40 mg [[id number]]          Significant 24hr Events      24 Hour Events/HPI: POD # 12 s/p tissue AVR. Continues on bumex infusion at 2 mg/hr.  Renal ultrasound completed - no hydronephrosis.     Subjective   Review of Systems   Respiratory:  Negative for shortness of breath.    Cardiovascular:  Negative for chest pain.   Gastrointestinal:  Negative for abdominal pain and nausea.        Objective     Medications:  Scheduled Continuous   acetaminophen, 650 mg, Q6H While awake  amiodarone, 200 mg, Q8H ANTON  aspirin, 81 mg, Daily  atorvastatin, 80 mg, Daily With Dinner  chlorhexidine, 15 mL, Q12H ANTON  ferrous sulfate, 325 mg, Daily With Breakfast  insulin lispro, 1-5 Units, TID AC  insulin lispro, 1-5 Units, HS  levalbuterol, 1.25 mg, BID  metolazone, 10 mg, Q8H  metoprolol tartrate, 25 mg, Q12H ANTON  pantoprazole, 40 mg, Early Morning  polyethylene glycol, 17 g, Daily  pramipexole, 1 mg, HS  senna-docusate sodium, 1 tablet, BID  sodium chloride, 4 mL, BID      bumetanide (BUMEX) 12.5 mg infusion 50 mL,  2 mg/hr, Last Rate: 2 mg/hr (24 0547)         Vitals: Invasive Monitoring       Most Recent Min/Max in 24hrs   Temp 97.5 °F (36.4 °C) Temp  Min: 97.1 °F (36.2 °C)  Max: 97.9 °F (36.6 °C)   Pulse 67 Pulse  Min: 66  Max: 91   Resp 20 Resp  Min: 18  Max: 34   /58 BP  Min: 100/54  Max: 157/60   O2 Sat 95 % SpO2  Min: 89 %  Max: 99 %   O2 Device: Nasal cannula  Nasal Cannula O2 Flow Rate (L/min): 1 L/min    I/O Chest tube output (if present)     Intake/Output Summary (Last 24 hours) at 3/2/2024 0636  Last data filed at 3/2/2024 0543  Gross per 24 hour   Intake 1939.34 ml   Output 2260 ml   Net -320.66 ml     UOP: 100 ml/hour    BM: 2 in the last 24 hours  Weight change: -0.4 kg (-14.1 oz)          Physical Exam   Physical Exam  Skin:     General: Skin is warm and dry.      Capillary Refill: Capillary refill takes less than 2 seconds.   HENT:      Head: Atraumatic.      Mouth/Throat:      Mouth: Mucous membranes are moist.   Cardiovascular:      Rate and Rhythm: Normal rate. Rhythm irregularly irregular.      Pulses:           Radial pulses are 2+ on the right side and 2+ on the left side.        Dorsalis pedis pulses are 2+ on the right side and 2+ on the left side.   Musculoskeletal:      Right lower le+ Pitting Edema present.      Left lower le+ Pitting Edema present.   Abdominal: General: There is no distension.      Palpations: Abdomen is soft.      Tenderness: There is no abdominal tenderness.   Constitutional:       General: She is not in acute distress.  Pulmonary:      Effort: Pulmonary effort is normal.      Breath sounds: No wheezing.      Comments: Diminished breath sounds at bases bilaterally  Neurological:      Mental Status: She is alert and oriented to person, place and time.   Genitourinary/Anorectal:  Ledesma present.        Diagnostic Studies               Labs:  CBC    Recent Labs     24  0422 24  0444   WBC 10.97* 11.06*   HGB 8.5* 8.7*   HCT 27.6* 28.4*    319      BMP    Recent Labs     03/01/24  2147 03/02/24  0444   SODIUM 126* 125*   K 3.3* 3.6   CL 79* 79*   CO2 32 32   AGAP 15 14   * 136*   CREATININE 3.03* 2.90*   CALCIUM 8.4 8.9        Baseline Creat: 1   Coags    Recent Labs     03/01/24  0422 03/02/24  0444   INR 2.91* 2.46*              Additional Electrolytes  Recent Labs     03/01/24  0422 03/02/24  0444   MG 3.0* 2.8*          Blood Gas    No recent results  No recent results LFTs  No recent results    Infectious    No recent results     No recent results Glucose  Recent Labs     03/01/24  1152 03/01/24  1801 03/01/24  2147 03/02/24  0444   GLUC 105 118 129 117        Collaborative bedside rounds performed with cardiac surgery attending, critical care attending and bedside RN.     Bernice Mclain PA-C

## 2024-03-02 NOTE — PROGRESS NOTES
Progress Note - Cardiothoracic Surgery   Natividad Ortiz 83 y.o. female MRN: [id number]  Unit/Bed#: [facility] 418-01 Encounter: [id number]      POD # 12 s/p AVR, TAVR explant.    Pt seen/examined.  Interval history and data reviewed with critical care team.  Continues to slowly improve  Creatinine downtrending  Good UOP on bumex and metolazone        Medications:   Scheduled Meds:  Current Facility-Administered Medications   Medication Dose Route Frequency Provider Last Rate    acetaminophen  650 mg Rectal Q4H PRN Yari Peñaloza PA-C      acetaminophen  650 mg Oral Q6H While awake Yari Peñaloza PA-C      amiodarone  200 mg Oral Q8H [facility] Yari Peñaloza PA-C      aspirin  81 mg Oral Daily Yari Peñaloza PA-C      atorvastatin  80 mg Oral Daily With Dinner Yari Peñaloza PA-C      bisacodyl  5 mg Oral Daily PRN Yari Peñaloza PA-C      bisacodyl  10 mg Rectal Daily PRN Yari Peñaloza PA-C      bumetanide (BUMEX) 12.5 mg infusion 50 mL  2 mg/hr Intravenous Continuous Deangelo Matos PA-C 2 mg/hr (03/02/24 0857)    chlorhexidine  15 mL Mouth/Throat Q12H [facility] Deangelo Matos PA-C      ferrous sulfate  325 mg Oral Daily With Breakfast Yari Peñaloza PA-C      insulin lispro  1-5 Units Subcutaneous TID AC Yari Peñaloza PA-C      insulin lispro  1-5 Units Subcutaneous HS Yari Peñaloza PA-C      levalbuterol  1.25 mg Nebulization BID Yari Peñaloza PA-C      magnesium hydroxide  30 mL Oral Daily PRN Yari Peñaloza PA-C      metoclopramide  10 mg Intravenous Q6H PRN Yari Peñaloza PA-C      metolazone  5 mg Oral Q8H Bernice Mclain PA-C      metoprolol tartrate  25 mg Oral Q12H [facility] Deangelo Matos PA-C      nystatin   Topical BID Bernice Mclain PA-C      ondansetron  4 mg Intravenous Q6H PRN Yari Peñaloza PA-C      oxyCODONE  2.5 mg Oral Q4H PRN Yari Peñaloza PA-C      Or    oxyCODONE  5 mg Oral Q4H PRN Yari Peñaloza PA-C       "pantoprazole  40 mg Oral Early Morning Yari Peñaloza PA-C      polyethylene glycol  17 g Oral Daily Yari Peñaloza PA-C      pramipexole  1 mg Oral HS Yari Peñaloza PA-C      senna-docusate sodium  1 tablet Oral BID Yari Peñaloza PA-C      sodium chloride  4 mL Nebulization BID Yari Peñaloza PA-C      temazepam  15 mg Oral HS PRN Yari Peñaloza PA-C      warfarin  1 mg Oral Once (warfarin) Bernice Mclain PA-C       Continuous Infusions:bumetanide (BUMEX) 12.5 mg infusion 50 mL, 2 mg/hr, Last Rate: 2 mg/hr (03/02/24 0857)      PRN Meds:.  acetaminophen    bisacodyl    bisacodyl    magnesium hydroxide    metoclopramide    ondansetron    oxyCODONE **OR** oxyCODONE    temazepam    Vitals: Blood pressure 114/58, pulse 64, temperature 97.6 °F (36.4 °C), temperature source Oral, resp. rate 19, height 5' 4\" (1.626 m), weight 106 kg (233 lb 0.4 oz), SpO2 95%.,Body mass index is 40 kg/m².  I/O last 24 hours:  In: 2087.9 [P.O.:1650; I.V.:237.9; IV Piggyback:200]  Out: 2505 [Urine:2505]  Invasive Devices       Peripheral Intravenous Line  Duration             Peripheral IV 02/28/24 Proximal;Right;Ventral (anterior) Forearm 2 days    Peripheral IV 03/02/24 Right;Ventral (anterior) Forearm <1 day              Drain  Duration             Urethral Catheter Non-latex 3 days                      Lab, Imaging and other studies:   Results from last 7 days   Lab Units 03/02/24  0444 03/01/24  0422 02/29/24  0426   WBC Thousand/uL 11.06* 10.97* 13.28*   HEMOGLOBIN g/dL 8.7* 8.5* 8.4*   HEMATOCRIT % 28.4* 27.6* 28.7*   PLATELETS Thousands/uL 319 307 301     Results from last 7 days   Lab Units 03/02/24  0444 03/01/24  2147 03/01/24  1801 02/28/24  1756 02/28/24  1403   POTASSIUM mmol/L 3.6 3.3* 3.6   < >  --    CHLORIDE mmol/L 79* 79* 78*   < >  --    CO2 mmol/L 32 32 31   < >  --    CO2, I-STAT mmol/L  --   --   --   --  38*   BUN mg/dL 136* 134* 135*   < >  --    CREATININE mg/dL 2.90* 3.03* 3.01*   " "< >  --    GLUCOSE, ISTAT mg/dl  --   --   --   --  114   CALCIUM mg/dL 8.9 8.4 8.7   < >  --     < > = values in this interval not displayed.     Results from last 7 days   Lab Units 03/02/24  0444 03/01/24  0422 02/29/24  0000   INR  2.46* 2.91* 2.96*     No results for input(s): \"PHART\", \"STL0YMS\", \"PO2ART\", \"BHJ9UUM\", \"L3HAJNHS\", \"BEART\" in the last 72 hours.        Plan:    Continue bumex for goal negative.   Aggressive chest PT, IS  PO ASA/Statin/B blocker.  Dose coumadin  Transfer to tele        SIGNATURE: Lester Thrasher DO  DATE: March 2, 2024  TIME: 11:17 AM   "

## 2024-03-02 NOTE — PLAN OF CARE
Problem: CARDIOVASCULAR - ADULT  Goal: Maintains optimal cardiac output and hemodynamic stability  Description: INTERVENTIONS:  - Monitor I/O, vital signs and rhythm  - Monitor for S/S and trends of decreased cardiac output  - Administer and titrate ordered vasoactive medications to optimize hemodynamic stability  - Assess quality of pulses, skin color and temperature  - Assess for signs of decreased coronary artery perfusion  - Instruct patient to report change in severity of symptoms  Outcome: Progressing  Goal: Absence of cardiac dysrhythmias or at baseline rhythm  Description: INTERVENTIONS:  - Continuous cardiac monitoring, vital signs, obtain 12 lead EKG if ordered  - Administer antiarrhythmic and heart rate control medications as ordered  - Monitor electrolytes and administer replacement therapy as ordered  Outcome: Progressing     Problem: RESPIRATORY - ADULT  Goal: Achieves optimal ventilation and oxygenation  Description: INTERVENTIONS:  - Assess for changes in respiratory status  - Assess for changes in mentation and behavior  - Position to facilitate oxygenation and minimize respiratory effort  - Oxygen administered by appropriate delivery if ordered  - Initiate smoking cessation education as indicated  - Encourage broncho-pulmonary hygiene including cough, deep breathe, Incentive Spirometry  - Assess the need for suctioning and aspirate as needed  - Assess and instruct to report SOB or any respiratory difficulty  - Respiratory Therapy support as indicated  Outcome: Progressing     Problem: METABOLIC, FLUID AND ELECTROLYTES - ADULT  Goal: Electrolytes maintained within normal limits  Description: INTERVENTIONS:  - Monitor labs and assess patient for signs and symptoms of electrolyte imbalances  - Administer electrolyte replacement as ordered  - Monitor response to electrolyte replacements, including repeat lab results as appropriate  - Instruct patient on fluid and nutrition as appropriate  Outcome:  Progressing  Goal: Fluid balance maintained  Description: INTERVENTIONS:  - Monitor labs   - Monitor I/O and WT  - Instruct patient on fluid and nutrition as appropriate  - Assess for signs & symptoms of volume excess or deficit  Outcome: Progressing  Goal: Glucose maintained within target range  Description: INTERVENTIONS:  - Monitor Blood Glucose as ordered  - Assess for signs and symptoms of hyperglycemia and hypoglycemia  - Administer ordered medications to maintain glucose within target range  - Assess nutritional intake and initiate nutrition service referral as needed  Outcome: Progressing     Problem: PAIN - ADULT  Goal: Verbalizes/displays adequate comfort level or baseline comfort level  Description: Interventions:  - Encourage patient to monitor pain and request assistance  - Assess pain using appropriate pain scale  - Administer analgesics based on type and severity of pain and evaluate response  - Implement non-pharmacological measures as appropriate and evaluate response  - Consider cultural and social influences on pain and pain management  - Notify physician/advanced practitioner if interventions unsuccessful or patient reports new pain  Outcome: Progressing     Problem: SAFETY ADULT  Goal: Patient will remain free of falls  Description: INTERVENTIONS:  - Educate patient/family on patient safety including physical limitations  - Instruct patient to call for assistance with activity   - Consult OT/PT to assist with strengthening/mobility   - Keep Call bell within reach  - Keep bed low and locked with side rails adjusted as appropriate  - Keep care items and personal belongings within reach  - Initiate and maintain comfort rounds  - Make Fall Risk Sign visible to staff  - Offer Toileting every 2 Hours, in advance of need  - Initiate/Maintain chair and bed alarm  - Obtain necessary fall risk management equipment: non skid footwear, fall risk signs, bed or chair alarms  - Apply yellow socks and bracelet  for high fall risk patients  - Consider moving patient to room near nurses station  Outcome: Progressing  Goal: Maintain or return to baseline ADL function  Description: INTERVENTIONS:  -  Assess patient's ability to carry out ADLs; assess patient's baseline for ADL function and identify physical deficits which impact ability to perform ADLs (bathing, care of mouth/teeth, toileting, grooming, dressing, etc.)  - Assess/evaluate cause of self-care deficits   - Assess range of motion  - Assess patient's mobility; develop plan if impaired  - Assess patient's need for assistive devices and provide as appropriate  - Encourage maximum independence but intervene and supervise when necessary  - Involve family in performance of ADLs  - Assess for home care needs following discharge   - Consider OT consult to assist with ADL evaluation and planning for discharge  - Provide patient education as appropriate  Outcome: Not Progressing  Goal: Maintains/Returns to pre admission functional level  Description: INTERVENTIONS:  - Perform AM-PAC 6 Click Basic Mobility/ Daily Activity assessment daily.  - Set and communicate daily mobility goal to care team and patient/family/caregiver.   - Collaborate with rehabilitation services on mobility goals if consulted  - Perform Range of Motion 3 times a day.  - Reposition patient every 2 hours.  - Dangle patient 3 times a day  - Stand patient 3 times a day  - Ambulate patient 3 times a day  - Out of bed to chair during day.   - Out of bed for meals 3  Problem: Knowledge Deficit  Goal: Patient/family/caregiver demonstrates understanding of disease process, treatment plan, medications, and discharge instructions  Description: Complete learning assessment and assess knowledge base.  Interventions:  - Provide teaching at level of understanding  - Provide teaching via preferred learning methods  Outcome: Progressing    times a day  - Out of bed for toileting  - Record patient progress and toleration  of activity level   Outcome: Not Progressing     Problem: Nutrition/Hydration-ADULT  Goal: Nutrient/Hydration intake appropriate for improving, restoring or maintaining nutritional needs  Description: Monitor and assess patient's nutrition/hydration status for malnutrition. Collaborate with interdisciplinary team and initiate plan and interventions as ordered.  Monitor patient's weight and dietary intake as ordered or per policy. Utilize nutrition screening tool and intervene as necessary. Determine patient's food preferences and provide high-protein, high-caloric foods as appropriate.     INTERVENTIONS:  - Monitor oral intake, urinary output, labs, and treatment plans  - Assess nutrition and hydration status and recommend course of action  - Evaluate amount of meals eaten  - Assist patient with eating if necessary   - Allow adequate time for meals  - Recommend/ encourage appropriate diets, oral nutritional supplements, and vitamin/mineral supplements  - Order, calculate, and assess calorie counts as needed  - Recommend, monitor, and adjust tube feedings and TPN/PPN based on assessed needs  - Assess need for intravenous fluids  - Provide specific nutrition/hydration education as appropriate  - Include patient/family/caregiver in decisions related to nutrition  Outcome: Not Progressing

## 2024-03-02 NOTE — PLAN OF CARE
Problem: CARDIOVASCULAR - ADULT  Goal: Maintains optimal cardiac output and hemodynamic stability  Description: INTERVENTIONS:  - Monitor I/O, vital signs and rhythm  - Monitor for S/S and trends of decreased cardiac output  - Administer and titrate ordered vasoactive medications to optimize hemodynamic stability  - Assess quality of pulses, skin color and temperature  - Assess for signs of decreased coronary artery perfusion  - Instruct patient to report change in severity of symptoms  Outcome: Progressing  Goal: Absence of cardiac dysrhythmias or at baseline rhythm  Description: INTERVENTIONS:  - Continuous cardiac monitoring, vital signs, obtain 12 lead EKG if ordered  - Administer antiarrhythmic and heart rate control medications as ordered  - Monitor electrolytes and administer replacement therapy as ordered  Outcome: Progressing     Problem: RESPIRATORY - ADULT  Goal: Achieves optimal ventilation and oxygenation  Description: INTERVENTIONS:  - Assess for changes in respiratory status  - Assess for changes in mentation and behavior  - Position to facilitate oxygenation and minimize respiratory effort  - Oxygen administered by appropriate delivery if ordered  - Initiate smoking cessation education as indicated  - Encourage broncho-pulmonary hygiene including cough, deep breathe, Incentive Spirometry  - Assess the need for suctioning and aspirate as needed  - Assess and instruct to report SOB or any respiratory difficulty  - Respiratory Therapy support as indicated  Outcome: Progressing

## 2024-03-03 NOTE — PROGRESS NOTES
NEPHROLOGY PROGRESS NOTE   Natividad Ortiz 83 y.o. female MRN: [id number]  Unit/Bed#: [facility] 428-01 Encounter: [id number]      ASSESSMENT & PLAN:  #1 acute kidney injury in the setting of hemodynamic perturbation and hypotension, anemia.  Creatinine peaked at 3.55 on 2/28.  Kidney function remains stable in the 2.9-3 range for the last several days on current regimen.  Noted worsening azotemia in the setting of diuresis  Continue with Bumex drip for another 24 hours and then consider decrease to intermittent dose tomorrow, cautious with metolazone given hyponatremia  Continue fluid restriction, keep MAP over 65  Monitor ins and outs and follow daily labs  Baseline creatinine 0.9-1.1    2 severe AS status post aortic valve replacement with pericardial tissue valve.  Postoperative management as per cardiac surgery team.  Status post right-sided thoracentesis on 2/28 with 950 cc fluid removed    3.  Acute blood loss anemia, status post blood transfusion, monitor H&H and recommend blood transfusion for hemoglobin less than 7, hemoglobin low but is stable last hemoglobin 8.8 this morning    4 acute hypercapnic respiratory failure, improving, continue diuresis, on oxygen via nasal cannula    #5 hemodynamic, blood pressure is stable, keep MAP over 65 mmHg    6 hyponatremia in the setting of volume overload and CHAYO as well as metolazone use, serum sodium is stable in the 125-126 range for the last several days, continue fluid restriction and diuresis, cautious with metolazone, if worsening consider stop metolazone    7 hypokalemia secondary to diuresis, continue with replacement as needed        SUBJECTIVE:  Patient seen and examined, feeling okay, denies significant complaint, no chest pain, no shortness of breath, no nausea, no vomiting.  Still on Bumex drip    OBJECTIVE:  Current Weight: Weight - Scale: 108 kg (238 lb 15.7 oz)  Vitals:    03/03/24 0849   BP:    Pulse: 76   Resp: 18   Temp:    SpO2: 95%        Intake/Output Summary (Last 24 hours) at 3/3/2024 1017  Last data filed at 3/3/2024 0900  Gross per 24 hour   Intake 648 ml   Output 1495 ml   Net -847 ml       General: conscious, cooperative, in not acute distress  Eyes: conjunctivae pale, anicteric sclerae  ENT: lips and mucous membranes moist  Neck: supple, no JVD  Chest: clear breath sounds bilateral, no crackles, ronchus or wheezings  CVS: distinct S1 & S2, normal rate, regular rhythm  Abdomen: soft, non-tender, non-distended, normoactive bowel sounds  Extremities: + edema of both legs  Skin: no rash  Neuro: awake, alert, oriented        Medications:    Current Facility-Administered Medications:     acetaminophen (TYLENOL) rectal suppository 650 mg, 650 mg, Rectal, Q4H PRN, Yari Peñaloza PA-C    acetaminophen (TYLENOL) tablet 650 mg, 650 mg, Oral, Q6H While awake, Yari Peñaloza PA-C, 650 mg at 03/02/24 1424    amiodarone tablet 200 mg, 200 mg, Oral, Q8H ANTON, Yari Peñaloza PA-C, 200 mg at 03/03/24 0547    aspirin (ECOTRIN LOW STRENGTH) EC tablet 81 mg, 81 mg, Oral, Daily, Yari Peñaloza PA-C, 81 mg at 03/03/24 0805    atorvastatin (LIPITOR) tablet 80 mg, 80 mg, Oral, Daily With Dinner, Yari Peñaloza PA-C, 80 mg at 03/02/24 1737    bisacodyl (DULCOLAX) EC tablet 5 mg, 5 mg, Oral, Daily PRN, Yari Peñaloza PA-C    bisacodyl (DULCOLAX) rectal suppository 10 mg, 10 mg, Rectal, Daily PRN, Yari Peñaloza PA-C, 10 mg at 02/27/24 1104    bumetanide (BUMEX) 12.5 mg infusion 50 mL, 2 mg/hr, Intravenous, Continuous, Yari Peñaloza PA-C, Last Rate: 8 mL/hr at 03/03/24 0800, 2 mg/hr at 03/03/24 0800    chlorhexidine (PERIDEX) 0.12 % oral rinse 15 mL, 15 mL, Mouth/Throat, Q12H ANTON, Yari Peñaloza PA-C, 15 mL at 03/03/24 0805    ferrous sulfate tablet 325 mg, 325 mg, Oral, Daily With Breakfast, Yari Peñaloza PA-C, 325 mg at 03/03/24 0806    insulin lispro (HumaLOG) 100 units/mL subcutaneous injection 1-5 Units, 1-5  Units, Subcutaneous, TID AC, 1 Units at 03/02/24 1221 **AND** Fingerstick Glucose (POCT), , , TID AC, Yari Peñaloza PA-C    insulin lispro (HumaLOG) 100 units/mL subcutaneous injection 1-5 Units, 1-5 Units, Subcutaneous, HS, Yari Peñaloza PA-C, 1 Units at 03/02/24 2141    levalbuterol (XOPENEX) inhalation solution 1.25 mg, 1.25 mg, Nebulization, BID, Yari Peñaloza PA-C, 1.25 mg at 03/03/24 0848    magnesium hydroxide (MILK OF MAGNESIA) oral suspension 30 mL, 30 mL, Oral, Daily PRN, Yari Peñaloza PA-C, 30 mL at 02/27/24 1726    metoclopramide (REGLAN) injection 10 mg, 10 mg, Intravenous, Q6H PRN, Yari Peñaloza PA-C    metolazone (ZAROXOLYN) tablet 5 mg, 5 mg, Oral, Q8H, Yari Peñaloza PA-C, 5 mg at 03/03/24 0547    metoprolol tartrate (LOPRESSOR) tablet 25 mg, 25 mg, Oral, Q12H ANTON, Yari Peñaloza PA-C, 25 mg at 03/03/24 0805    nystatin (MYCOSTATIN) powder, , Topical, BID, Yari Peñaloza PA-C, Given at 03/03/24 0806    ondansetron (ZOFRAN) injection 4 mg, 4 mg, Intravenous, Q6H PRN, Yari Peñaloza PA-C    oxyCODONE (ROXICODONE) split tablet 2.5 mg, 2.5 mg, Oral, Q4H PRN **OR** oxyCODONE (ROXICODONE) IR tablet 5 mg, 5 mg, Oral, Q4H PRN, Yari Peñaloza PA-C, 5 mg at 02/22/24 1843    pantoprazole (PROTONIX) EC tablet 40 mg, 40 mg, Oral, Early Morning, Yari Peñaloza PA-C, 40 mg at 03/03/24 0547    polyethylene glycol (MIRALAX) packet 17 g, 17 g, Oral, Daily, Yari Peñaloza PA-C, 17 g at 02/27/24 0839    potassium chloride (Klor-Con M20) CR tablet 20 mEq, 20 mEq, Oral, BID, Yari Peñaloza PA-C    potassium chloride (Klor-Con M20) CR tablet 40 mEq, 40 mEq, Oral, Once, Yari Peñaloza PA-C    pramipexole (MIRAPEX) tablet 1 mg, 1 mg, Oral, HS, Yari Peñaloza PA-C, 1 mg at 02/27/24 2255    senna-docusate sodium (SENOKOT S) 8.6-50 mg per tablet 1 tablet, 1 tablet, Oral, BID, Yari Peñaloza PA-C, 1 tablet at 03/02/24 1737    sodium chloride 3  % inhalation solution 4 mL, 4 mL, Nebulization, BID, Yari Peñaloza PA-C, 4 mL at 03/03/24 0848    temazepam (RESTORIL) capsule 15 mg, 15 mg, Oral, HS PRN, Yari Peñaloza PA-C    warfarin (COUMADIN) tablet 2.5 mg, 2.5 mg, Oral, Once (warfarin), Yari Peñaloza PA-C    Invasive Devices:   Urethral Catheter Non-latex (Active)   Reasons to continue Urinary Catheter  Accurate I&O assessment in critically ill patients (48 hr. max) 02/29/24 0800   Goal for Removal Voiding trial when ambulation improves 02/29/24 0800   Site Assessment Clean;Skin intact 02/29/24 0800   Ledesma Care Done 02/28/24 2000   Collection Container Standard drainage bag 02/29/24 0800   Securement Method Securing device (Describe) 02/29/24 0800   Securing Device Change Date 03/06/24 02/28/24 1030   Output (mL) 125 mL 02/29/24 1000       Lab Results:   Results from last 7 days   Lab Units 03/03/24  0439 03/02/24  2244 03/02/24  1730 03/02/24  1027 03/02/24  0444 03/01/24  1152 03/01/24  0422 02/29/24  0426 02/29/24  0000 02/28/24  1756 02/28/24  1403 02/28/24  0859   WBC Thousand/uL 10.84*  --   --   --  11.06*  --  10.97*   < >  --   --   --   --    HEMOGLOBIN g/dL 8.8*  --   --   --  8.7*  --  8.5*   < >  --   --   --   --    I STAT HEMOGLOBIN g/dl  --   --   --   --   --   --   --   --   --   --  9.9* 10.9*   HEMATOCRIT % 27.4*  --   --   --  28.4*  --  27.6*   < >  --   --   --   --    HEMATOCRIT, ISTAT %  --   --   --   --   --   --   --   --   --   --  29* 32*   PLATELETS Thousands/uL 322  --   --   --  319  --  307   < >  --   --   --   --    SODIUM mmol/L 125* 124* 127*   < > 125*   < > 127*   < > 132*   < >  --   --    POTASSIUM mmol/L 3.4* 3.4* 3.5   < > 3.6   < > 4.0   < > 4.4   < >  --   --    CHLORIDE mmol/L 76* 76* 81*   < > 79*   < > 81*   < > 85*   < >  --   --    CO2 mmol/L 33* 32 33*   < > 32   < > 30   < > 35*   < >  --   --    CO2, I-STAT mmol/L  --   --   --   --   --   --   --   --   --   --  38* 35*   BUN mg/dL 144*  "143* 137*   < > 136*   < > 126*   < > 114*   < >  --   --    CREATININE mg/dL 3.07* 2.94* 2.95*   < > 2.90*   < > 3.10*   < > 3.27*   < >  --   --    CALCIUM mg/dL 8.9 8.5 8.4   < > 8.9   < > 8.7   < > 8.4   < >  --   --    MAGNESIUM mg/dL  --   --   --   --  2.8*  --  3.0*  --  2.8*  --   --   --    GLUCOSE, ISTAT mg/dl  --   --   --   --   --   --   --   --   --   --  114 152*    < > = values in this interval not displayed.       Previous work up:  See previous notes      Portions of the record may have been created with voice recognition software. Occasional wrong word or \"sound a like\" substitutions may have occurred due to the inherent limitations of voice recognition software. Read the chart carefully and recognize, using context, where substitutions have occurred.If you have any questions, please contact the dictating provider.  "

## 2024-03-03 NOTE — PROGRESS NOTES
Progress Note - Cardiothoracic Surgery   Natividad Ortiz 83 y.o. female MRN: [id number]  Unit/Bed#: [facility] 428-01 Encounter: [id number]    Aortic stenosis, Non-Rheumatic. S/P aortic valve replacement; POD # 13      24 Hour Events: Tx out of ICU, Cont Bumex gtt and metolazone. Renal following. Alert and sitting up in chair. Breathing feels ok. Appetite ok. +BM    Medications:   Scheduled Meds:  Current Facility-Administered Medications   Medication Dose Route Frequency Provider Last Rate    acetaminophen  650 mg Rectal Q4H PRN Yari Peñaloza PA-C      acetaminophen  650 mg Oral Q6H While awake Yari Peñaloza PA-C      amiodarone  200 mg Oral Q8H ANTON Yari Peñaloza PA-C      aspirin  81 mg Oral Daily Yari Peñaloza PA-C      atorvastatin  80 mg Oral Daily With Dinner Yari Peñaloza PA-C      bisacodyl  5 mg Oral Daily PRN Yari Peñaloza PA-C      bisacodyl  10 mg Rectal Daily PRN Yari Peñaloza PA-C      bumetanide (BUMEX) 12.5 mg infusion 50 mL  2 mg/hr Intravenous Continuous Yari Peñaloza PA-C 2 mg/hr (03/03/24 0800)    chlorhexidine  15 mL Mouth/Throat Q12H [facility] Yari Peñaloza PA-C      ferrous sulfate  325 mg Oral Daily With Breakfast Yari Peñaloza PA-C      insulin lispro  1-5 Units Subcutaneous TID AC Yari Peñaloza PA-C      insulin lispro  1-5 Units Subcutaneous HS Yari Peñaloza PA-C      levalbuterol  1.25 mg Nebulization BID Yari Peñaloza PA-C      magnesium hydroxide  30 mL Oral Daily PRN Yari Peñaloza PA-C      metoclopramide  10 mg Intravenous Q6H PRN Yari Peñaloza PA-C      metolazone  5 mg Oral Q8H Yari Peñaloza PA-C      metoprolol tartrate  25 mg Oral Q12H ANTON Yari Peñaloza PA-C      nystatin   Topical BID Yari Peñaloza PA-C      ondansetron  4 mg Intravenous Q6H PRN Yari Peñaloza PA-C      oxyCODONE  2.5 mg Oral Q4H PRN Yari Peñaloza PA-C      Or    oxyCODONE  5 mg Oral Q4H PRN Yari Sen  BARB Peñaloza      pantoprazole  40 mg Oral Early Morning Yari Peñaloza PA-C      polyethylene glycol  17 g Oral Daily Yari Peñaloza PA-C      pramipexole  1 mg Oral HS Yari Peñaloza PA-C      senna-docusate sodium  1 tablet Oral BID Yari Peñaloza PA-C      sodium chloride  4 mL Nebulization BID Yari Peñaloza PA-C      temazepam  15 mg Oral HS PRN Yari Peñaloza PA-C       Continuous Infusions:bumetanide (BUMEX) 12.5 mg infusion 50 mL, 2 mg/hr, Last Rate: 2 mg/hr (03/03/24 0800)        PRN Meds:.  acetaminophen    bisacodyl    bisacodyl    magnesium hydroxide    metoclopramide    ondansetron    oxyCODONE **OR** oxyCODONE    temazepam    Vitals:   Vitals:    03/03/24 0328 03/03/24 0600 03/03/24 0717 03/03/24 0802   BP: 123/66  111/51 119/70   BP Location: Right arm      Pulse: 65      Resp:   17 20   Temp: (!) 97.4 °F (36.3 °C)  (!) 97.3 °F (36.3 °C)    TempSrc: Oral      SpO2: 96%      Weight:  108 kg (238 lb 15.7 oz)     Height:           Telemetry: Atrial Fibrillation; Heart Rate: 60-70s    Respiratory:   SpO2: SpO2: 96 %; 3 LPM    Intake/Output:     Intake/Output Summary (Last 24 hours) at 3/3/2024 0810  Last data filed at 3/3/2024 0600  Gross per 24 hour   Intake 648 ml   Output 1145 ml   Net -497 ml   UO 375ml    Chest tube Output:  removed    Weights:   Weight (last 2 days)       Date/Time Weight    03/03/24 0600 108 (238.98)    03/02/24 0559 106 (233.03)    03/01/24 0600 106 (233.91)              Results:   Results from last 7 days   Lab Units 03/03/24  0439 03/02/24  0444 03/01/24  0422   WBC Thousand/uL 10.84* 11.06* 10.97*   HEMOGLOBIN g/dL 8.8* 8.7* 8.5*   HEMATOCRIT % 27.4* 28.4* 27.6*   PLATELETS Thousands/uL 322 319 307     Results from last 7 days   Lab Units 03/03/24  0439 03/02/24  2244 03/02/24  1730 02/28/24  1756 02/28/24  1403   SODIUM mmol/L 125* 124* 127*   < >  --    POTASSIUM mmol/L 3.4* 3.4* 3.5   < >  --    CHLORIDE mmol/L 76* 76* 81*   < >  --    CO2  mmol/L 33* 32 33*   < >  --    CO2, I-STAT mmol/L  --   --   --   --  38*   BUN mg/dL 144* 143* 137*   < >  --    CREATININE mg/dL 3.07* 2.94* 2.95*   < >  --    GLUCOSE, ISTAT mg/dl  --   --   --   --  114   CALCIUM mg/dL 8.9 8.5 8.4   < >  --     < > = values in this interval not displayed.     Results from last 7 days   Lab Units 03/03/24  0439 03/02/24  0444 03/01/24  0422   INR  2.13* 2.46* 2.91*         Date:   INR:  Coumadin Dose:  3/3  2.13  2.5  3/2  2.46  1  3/1  2.91  0  2/29  2.96  1  2/28  2.35  2.5  2/27  1.88  5  2/26  1.61  5  2/25  1.71  5  2/24  1.83  2.5  2/23  1.86  2.5  2/22  1.93  0  2/21  1.97  2.5            Point of care glucose: 112 - 177    Studies:  None today    I have personally reviewed pertinent reports.   and I have personally reviewed pertinent films in PACS    Invasive Lines/Tubes:  Invasive Devices       Peripheral Intravenous Line  Duration             Peripheral IV 03/02/24 Right;Ventral (anterior) Forearm 1 day              Drain  Duration             Urethral Catheter Non-latex 3 days                    Physical Exam:    General: No acute distress, Alert, and Normal appearance  HEENT/NECK:  Normocephalic. Atraumatic.  no jugular venous distention.    Cardiac: Regular rate and rhythm and No murmurs/rubs/gallops  Pulmonary:  Breath sounds diminished in the bases bilaterally  and No rales/rhonchi/wheezes  Abdomen:  Non-tender, Non-distended, and Normal bowel sounds  Incisions: Sternum is stable.  Incision is clean, dry, and intact.   Extremities: Extremities warm/dry and 1+ edema B/L  Neuro: Alert and oriented X 3, Sensation is grossly intact, and No focal deficits  Skin: Warm/Dry, without rashes or lesions.       Assessment:  Principal Problem:    S/P AVR  Active Problems:    Hiatal hernia    Atrial fibrillation (HCC) [I48.91]    Acquired deformity of foot    Diabetic polyneuropathy associated with type 2 diabetes mellitus (HCC)    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HCC)    RLS  (restless legs syndrome)    Essential hypertension    Multiple lung nodules    Severe obesity (BMI 35.0-39.9) with comorbidity (HCC)    GERD (gastroesophageal reflux disease)    Chronic edema    Deep venous insufficiency    Stage 3a chronic kidney disease (HCC)    Chronic diastolic CHF (congestive heart failure) ([facility])    Aortic stenosis, severe       Aortic stenosis, Non-Rheumatic. S/P aortic valve replacement; POD # 13    Plan:    Cardiac:     Elective surgical admission  Normal ventricular systolic function, EF 55%    Atrial Fibrillation; HR well-controlled  BP well-controlled    Continue Lopressor, 25mg PO BID     Hold ACE inhibitor/ARB secondary to renal dysfunction    Continue prophylactic Amiodarone, 200 mg PO TID    Anticoagulated for Afib  INR 2.13, Dose 2.5mg Coumadin today    Continue ASA 81 and Statin therapy    Epicardial pacing wires have been removed    TLC removed    Continue Coumadin for DVT prophylaxis    Pulmonary:     Acute post-op pulmonary insufficiency; Requiring 3 liters via nasal cannula, secondary to atelectasis, pulmonary vascular congestion, poor inspiratory effort secondary to pain, and body habitus/obesity. Continue incentive spirometry/coughing/deep breathing exercises.  Wean supplemental oxygen as tolerated for saturation > 90%    Chest tubes have been discontinued    Renal:     Postoperative CHAYO, with creatinine 3.07 from 2.9, from baseline 1.1;  Follow up daily BMP, renal following    Intake/Output net: -604 mL/24 hours, UO1.3L/24hrs    Diuretic Regimen:   Cont Bumex gtt @ 2, consider bolus dosing?   Cont Metolazone 5mg q8 hrs, Monitor NA  K3.4, replete  , monitor    Neuro:    Neurologically intact; No active issues     Incisional pain well controlled   Continue tylenol, 975 mg PO q 8, standing dose   Continue oxycodone, 2.5 to 5 mg PO q 4 hours prn pain  Cont home gabapentin    GI:    Cardiac diet, with 1800 mL fluid restriction    Tolerating diet without complaint  +  Flatus  + BM postoperatively    Continue stool softeners and prn suppository    Continue GI prophylaxis    Endo:     Pre-Op Hgb A1C: 6.1    Patient has been transitioned from continuous insulin infusion to intermittent subcutaneous dosing  Serum glucose well controlled on insulin sliding scale coverage    7    Hematology:     Post-operative acute blood loss anemia; Hemoglobin 8.8; trend prn  Iron and Vit C    8.   Disposition:        Following daily PT/OT recommendations regarding home vs. rehab when medically cleared for discharge  D/c planning to rehab        VTE Pharmacologic Prophylaxis: Warfarin (Coumadin)  VTE Mechanical Prophylaxis: sequential compression device    Collaborative rounds completed with supervising physician  Plan of care discussed with bedside nurse    SIGNATURE: aYri Peñaloza PA-C  DATE: March 3, 2024  TIME: 8:10 AM

## 2024-03-04 NOTE — PLAN OF CARE
Problem: OCCUPATIONAL THERAPY ADULT  Goal: Performs self-care activities at highest level of function for planned discharge setting.  See evaluation for individualized goals.  Description: Treatment Interventions: ADL retraining, Functional transfer training, Endurance training, Patient/family training, Continued evaluation, Cardiac education, Energy conservation, Activityengagement, Equipment evaluation/education          See flowsheet documentation for full assessment, interventions and recommendations.   Outcome: Progressing  Note: Limitation: Decreased ADL status, Decreased Safe judgement during ADL, Decreased cognition, Decreased endurance, Decreased self-care trans, Decreased high-level ADLs  Prognosis: Fair  Assessment: Pt seen for OT treatment session day 3 on this date focused on ADL retraining, education, and activity engagement. Pt was greeted in bed and was not agreeable to bed mobility and transfers although was cooperative to supine ADLs during session. Following session, pt was left in bed with bed alarm on and all needs within reach. Pt continues to be limited by functional status related to ADLs and transfers requiring MAX A for LB dressing, setup/positioning for supine grooming. Continue to provide education and vc for encouragement regarding mobilization with therapy.   The patient's raw score on the AM-PAC Daily Activity Inpatient Short Form is 14. A raw score of less than 19 suggests the patient may benefit from discharge to post-acute rehabilitation services. Please refer to the recommendation of the Occupational Therapist for safe discharge planning. Pt would benefit from continued acute OT intervention with plan to continue OT treatment sessions 2-3x per week. Recommend d/c to post acute rehab services.     Rehab Resource Intensity Level, OT: II (Moderate Resource Intensity)

## 2024-03-04 NOTE — PHYSICAL THERAPY NOTE
PHYSICAL THERAPY NOTE          Patient Name: Natividad Ortiz  Today's Date: 3/4/2024       03/04/24 1441   PT Last Visit   PT Visit Date 03/04/24   Note Type   Note Type Treatment   Pain Assessment   Pain Assessment Tool 0-10   Pain Score No Pain   Restrictions/Precautions   Other Precautions Cardiac/sternal;Multiple lines;Telemetry;O2   General   Additional Pertinent History Requested by [facility] staff to (A) w/ getting pt OOB (pt is agreeable to mobilize)   Response to Previous Treatment Patient with no complaints from previous session.   Family/Caregiver Present Yes   Cognition   Overall Cognitive Status WFL   Arousal/Participation Alert;Cooperative   Attention Attends with cues to redirect   Orientation Level Oriented to person;Oriented to place;Oriented to situation   Memory Decreased recall of precautions   Following Commands Follows one step commands without difficulty   Subjective   Subjective Alert; in bed; reports her son convinced her to get OOB; denies dizziness; somewhat flat affect   Bed Mobility   Supine to Sit 2  Maximal assistance   Additional items Assist x 2;Assist x 3;Increased time required;Verbal cues;LE management   Transfers   Sit to Stand 3  Moderate assistance   Additional items Assist x 2;Increased time required;Verbal cues   Stand to Sit 3  Moderate assistance   Additional items Assist x 2;Increased time required;Verbal cues   Stand pivot 3  Moderate assistance  (took a few shuffling steps from bed to drop arm chair to the (R) side)   Additional items Assist x 2;Increased time required;Verbal cues   Ambulation/Elevation   Gait pattern Not appropriate;Not tested   Balance   Static Sitting Poor +   Dynamic Sitting Poor   Static Standing Poor   Dynamic Standing Poor -   Activity Tolerance   Activity Tolerance Patient limited by fatigue   Nurse Made Aware spoke to NAJMA Mo   Assessment   Prognosis Guarded    Problem List Decreased strength;Decreased endurance;Impaired balance;Decreased mobility;Obesity   Assessment Functional mobility training performed this PM to facilitate OOB to chair transition per nsg requested; pt required (A)x2 w/ all aspects of mobility and was able to take a few shuffling steps w/ no increased discomfort or excessive fatigue expressed; overall, cont to recommend rehab upon D/C; will follow   Goals   Patient Goals to be in the chair   LTG Expiration Date 03/08/24   PT Treatment Day 5   Plan   Treatment/Interventions Functional transfer training;LE strengthening/ROM;Therapeutic exercise;Endurance training;Bed mobility;Gait training;Spoke to nursing   Progress Slow progress, decreased activity tolerance   PT Frequency 3-5x/wk   Discharge Recommendation   Rehab Resource Intensity Level, PT I (Maximum Resource Intensity)   AM-PAC Basic Mobility Inpatient   Turning in Flat Bed Without Bedrails 2   Lying on Back to Sitting on Edge of Flat Bed Without Bedrails 1   Moving Bed to Chair 2   Standing Up From Chair Using Arms 2   Walk in Room 1   Climb 3-5 Stairs With Railing 1   Basic Mobility Inpatient Raw Score 9   Turning Head Towards Sound 4   Follow Simple Instructions 4   Low Function Basic Mobility Raw Score  17   Low Function Basic Mobility Standardized Score  27.46   Highest Level Of Mobility   JH-HLM Goal 3: Sit at edge of bed   JH-HLM Achieved 4: Move to chair/commode   Education   Education Provided Mobility training   Patient Demonstrates verbal understanding;Reinforcement needed   End of Consult   Patient Position at End of Consult Bedside chair;All needs within reach   End of Consult Comments Some residual stool noted on the pad upon repositioning; nsg staff remained w/ the pt for pericare at the end of session     Dimitry Bocanegra

## 2024-03-04 NOTE — PHYSICAL THERAPY NOTE
PHYSICAL THERAPY NOTE          Patient Name: Natividad Ortiz  Today's Date: 3/4/2024       03/04/24 1012   PT Last Visit   PT Visit Date 03/04/24   Note Type   Note Type Treatment   Pain Assessment   Pain Assessment Tool 0-10   Pain Score No Pain   Restrictions/Precautions   Other Precautions Cardiac/sternal;Multiple lines;Telemetry;O2   General   Chart Reviewed Yes   Additional Pertinent History cleared for Tx session by nsg   Response to Previous Treatment Patient with no complaints from previous session.   Cognition   Overall Cognitive Status WFL   Arousal/Participation Alert   Attention Attends with cues to redirect   Orientation Level Oriented to person;Oriented to place;Oriented to situation   Memory Decreased recall of recent events   Following Commands Follows one step commands with increased time or repetition   Subjective   Subjective Alert; in bed; reports she will have a day to herself and will remain resting; extensive education, encouragement and re-assurance provided including discussion of her recently achieved mobility status and the importance of mobilization; pt is eventually agreeable to perform therex in bed;   Exercises   Heelslides Supine;15 reps;AAROM;Bilateral  (hip/knee flex/ext)   Hip Abduction Supine;15 reps;AAROM;Bilateral   Knee AROM Short Arc Quad Supine;15 reps;AAROM;Bilateral   Ankle Pumps Supine;15 reps;AAROM;AROM;Bilateral   Activity Tolerance    Activity Tolerance                          Patient limited by fatigue    Nurse Made Aware                       spoke to NAJMA Mo    Assessment   Prognosis Guarded   Problem List Decreased strength;Decreased endurance;Impaired balance;Decreased mobility;Obesity   Assessment Bed level LE therex and extensive education performed; pt appears to be frustrated and discouraged and declines mobilization at this time; encouragement and re-assurance provided w/  attempt to facilitate motivation and participation in activities; pt expressed appreciation and agreed to perform therex at that time; will cont to follow pt in PT for graded mobilization as clinical course allows; recommend rehab upon D/C when medically cleared   Barriers to Discharge Inaccessible home environment;Decreased caregiver support   Goals   Patient Goals to rest   LTG Expiration Date 03/08/24   PT Treatment Day 4   Plan   Treatment/Interventions Functional transfer training;LE strengthening/ROM;Therapeutic exercise;Endurance training;Bed mobility;Gait training;Spoke to nursing;OT;Spoke to case management   Progress Slow progress, decreased activity tolerance   PT Frequency 3-5x/wk   Discharge Recommendation   Rehab Resource Intensity Level, PT I (Maximum Resource Intensity)   AM-PAC Basic Mobility Inpatient   Turning in Flat Bed Without Bedrails 2   Lying on Back to Sitting on Edge of Flat Bed Without Bedrails 2   Moving Bed to Chair 1   Standing Up From Chair Using Arms 1   Walk in Room 1   Climb 3-5 Stairs With Railing 1   Basic Mobility Inpatient Raw Score 8   Turning Head Towards Sound 4   Follow Simple Instructions 3   Low Function Basic Mobility Raw Score  15   Low Function Basic Mobility Standardized Score  23.9   Highest Level Of Mobility   JH-HLM Goal 3: Sit at edge of bed   JH-HLM Achieved 2: Bed activities/Dependent transfer   Education   Education Provided Home exercise program   Patient Demonstrates verbal understanding;Reinforcement needed   End of Consult   Patient Position at End of Consult Supine;All needs within reach     Dimitry Bocanegra

## 2024-03-04 NOTE — PROGRESS NOTES
"Progress Note - Cardiothoracic Surgery   Natividad Ortiz 83 y.o. female MRN: [id number]  Unit/Bed#: [facility] 428-01 Encounter: [id number]    Severe AS S/P AVR 21mm Inspiris; POD # 14      24 Hour Events: Currently on bumex gtt at 2, metolazone q8H. Cr down to 2.50 (2.75, 2.86, 3.07), NA down to 125. Patient stating \"she wants to give up\", and \"stop everything and just go\", starting to refuse treatment. States she feels so poor and lousy and weak.     Medications:   Scheduled Meds:  Current Facility-Administered Medications   Medication Dose Route Frequency Provider Last Rate    acetaminophen  650 mg Rectal Q4H PRN Yari Peñaloza PA-C      acetaminophen  650 mg Oral Q6H While awake Yari Peñaloza PA-C      amiodarone  200 mg Oral Q8H ANTON Yari Peñaloza PA-C      aspirin  81 mg Oral Daily Yari Peñaloza PA-C      atorvastatin  80 mg Oral Daily With Dinner Yari Peñaloza PA-C      bisacodyl  5 mg Oral Daily PRN Yari Peñaloza PA-C      bisacodyl  10 mg Rectal Daily PRN Yari Peñaloza PA-C      bumetanide  2 mg Intravenous TID Fidencio Murillo PA-C      chlorhexidine  15 mL Mouth/Throat Q12H ANTON Yari Peñaloza PA-C      ferrous sulfate  325 mg Oral Daily With Breakfast Yari Peñaloza PA-C      insulin lispro  1-5 Units Subcutaneous TID AC Yari Peñaloza PA-C      insulin lispro  1-5 Units Subcutaneous HS Yari Peñaloza PA-C      levalbuterol  1.25 mg Nebulization BID Yari Peñaloza PA-C      magnesium hydroxide  30 mL Oral Daily PRN Yari Peñaloza PA-C      metoclopramide  10 mg Intravenous Q6H PRN Yari Peñaloza PA-C      metoprolol tartrate  25 mg Oral Q12H ANTON Yari Peñaloza PA-C      nystatin   Topical BID Yari Peñaloza PA-C      ondansetron  4 mg Intravenous Q6H PRN Yari Peñaloza PA-C      oxyCODONE  2.5 mg Oral Q4H PRN Yari Peñaloza PA-C      Or    oxyCODONE  5 mg Oral Q4H PRN Yari Peñaloza PA-C      pantoprazole  40 mg " Oral Early Morning Yari Peñaloza PA-C      polyethylene glycol  17 g Oral Daily Yari Peñaloza PA-C      potassium chloride  20 mEq Oral BID Yari Peñaloza PA-C      pramipexole  1 mg Oral HS Yari Peñaloza PA-C      senna-docusate sodium  1 tablet Oral BID Yari Peñaloza PA-C      sodium chloride  4 mL Nebulization BID Yari Peñaloza PA-C      temazepam  15 mg Oral HS PRN Yari Peñaloza PA-C      warfarin  2.5 mg Oral Once (warfarin) Fidencio Murillo PA-C       Continuous Infusions:       PRN Meds:.  acetaminophen    bisacodyl    bisacodyl    magnesium hydroxide    metoclopramide    ondansetron    oxyCODONE **OR** oxyCODONE    temazepam    Vitals:   Vitals:    03/04/24 0703 03/04/24 0713 03/04/24 0721 03/04/24 0821   BP:   111/61 133/75   Pulse:    63   Resp:       Temp:   (!) 97.3 °F (36.3 °C)    TempSrc:       SpO2:  93%     Weight: 109 kg (240 lb 8.4 oz)      Height:           Telemetry: Atrial Fibrillation; Heart Rate: 60-70s    Respiratory:   SpO2: SpO2: 93 %; 3 LPM    Intake/Output:     Intake/Output Summary (Last 24 hours) at 3/4/2024 0903  Last data filed at 3/4/2024 0823  Gross per 24 hour   Intake 840 ml   Output 2250 ml   Net -1410 ml   UO 375ml    Chest tube Output:  removed    Weights:   Weight (last 2 days)       Date/Time Weight    03/04/24 0703 109 (240.52)    03/04/24 0537 112 (247.58)    03/03/24 0600 108 (238.98)    03/02/24 0559 106 (233.03)              Results:   Results from last 7 days   Lab Units 03/04/24  0424 03/03/24  0439 03/02/24  0444   WBC Thousand/uL 10.92* 10.84* 11.06*   HEMOGLOBIN g/dL 8.5* 8.8* 8.7*   HEMATOCRIT % 27.0* 27.4* 28.4*   PLATELETS Thousands/uL 300 322 319     Results from last 7 days   Lab Units 03/04/24  0424 03/03/24  1639 03/03/24  1017 02/28/24  1756 02/28/24  1403   SODIUM mmol/L 125* 126* 125*   < >  --    POTASSIUM mmol/L 3.9 3.6 3.5   < >  --    CHLORIDE mmol/L 76* 74* 76*   < >  --    CO2 mmol/L 34* 35* 34*   < >  --     CO2, I-STAT mmol/L  --   --   --   --  38*   BUN mg/dL 144* 138* 136*   < >  --    CREATININE mg/dL 2.50* 2.75* 2.86*   < >  --    GLUCOSE, ISTAT mg/dl  --   --   --   --  114   CALCIUM mg/dL 8.8 9.1 9.1   < >  --     < > = values in this interval not displayed.     Results from last 7 days   Lab Units 03/04/24  0424 03/03/24  0439 03/02/24  0444   INR  2.00* 2.13* 2.46*         Date:   INR:  Coumadin Dose:  3/4  2.00  2.5   3/3  2.13  2.5  3/2  2.46  1  3/1  2.91  0  2/29  2.96  1  2/28  2.35  2.5  2/27  1.88  5  2/26  1.61  5  2/25  1.71  5  2/24  1.83  2.5  2/23  1.86  2.5  2/22  1.93  0  2/21  1.97  2.5            Point of care glucose: 112 - 177    Studies:  None today    I have personally reviewed pertinent reports.   and I have personally reviewed pertinent films in PACS    Invasive Lines/Tubes:  Invasive Devices       Peripheral Intravenous Line  Duration             Peripheral IV 03/02/24 Right;Ventral (anterior) Forearm 2 days    Peripheral IV 03/03/24 Right Antecubital <1 day              Drain  Duration             Urethral Catheter Non-latex 4 days                    Physical Exam:    General:  Weak, elderly, ill appearing.   HEENT/NECK:  Normocephalic. Atraumatic.    Cardiac: Regular rate and rhythm  Pulmonary:  Breath sounds diminished in the bases bilaterally  and Poor inspiratory effort  Abdomen:  Non-tender and Non-distended  Incisions: Sternum is stable.  Incision is clean, dry, and intact.   Extremities: 2+ edema B/L  Neuro: No focal deficits  Skin: Warm/Dry, without rashes or lesions.       Assessment:  Principal Problem:    S/P AVR  Active Problems:    Hiatal hernia    Atrial fibrillation (HCC) [I48.91]    Acquired deformity of foot    Diabetic polyneuropathy associated with type 2 diabetes mellitus (HCC)    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HCC)    RLS (restless legs syndrome)    Essential hypertension    Multiple lung nodules    Severe obesity (BMI 35.0-39.9) with comorbidity (HCC)    GERD  (gastroesophageal reflux disease)    Chronic edema    Deep venous insufficiency    Stage 3a chronic kidney disease (HCC)    Chronic diastolic CHF (congestive heart failure) ([facility])    Aortic stenosis, severe       Severe AS S/P AVR 21mm Inspiris; POD # 14    Plan:    Cardiac:     Elective surgical admission  Normal ventricular systolic function, EF 55%  Chronic diastolic CHF prior to admit  Post op volume overload    Atrial Fibrillation; HR well-controlled  BP well-controlled    Continue Lopressor, 25mg PO BID     Hold ACE inhibitor/ARB secondary to renal dysfunction    Continue prophylactic Amiodarone, 200 mg PO TID    Anticoagulated for Afib  INR 2.00, Dose 2.5mg Coumadin today    Continue ASA 81 and Statin therapy    Epicardial pacing wires have been removed    TLC removed    Continue Coumadin for DVT prophylaxis    Pulmonary:   Post op acute hypoxic resp failure,resolved    Acute post-op pulmonary insufficiency; Requiring 3 liters via nasal cannula, secondary to atelectasis, pulmonary vascular congestion, poor inspiratory effort secondary to pain, and body habitus/obesity. Continue incentive spirometry/coughing/deep breathing exercises.  Wean supplemental oxygen as tolerated for saturation > 90%    Chest tubes have been discontinued    Post op right pleural effusion s/p right thoracentesis for 950cc 2/28      Renal:     Postoperative CHAYO, improving  with Cr 2.50 from ,2.75, 2.86, 3.07, pk 3.55   baseline 1.1;  Follow up daily BMP,   Renal following  Post op volume overload  Intake/Output net: -1166 mL/24 hours    Diuretic Regimen:  Decrease bumex gtt to bumex 2mg IV TID  Stop Metolazone     Hypokalemia  Hyponatremia,  , monitor, discussed with renal     Neuro:  Very weak and significant deconditioning.     Situational depression, pt stating she wants to give up and stop everything. Offered encouragement, educated that she is not being supported with anything that we can stop that would make her pass away.  Discussed with son at bedside and he understood and agreed. Will review with Dr. Mccullough, may consider psychiatry consult/palliative care education.      Neurologically intact; No active issues     Incisional pain well controlled   Continue tylenol, 975 mg PO q 8, standing dose   Continue oxycodone, 2.5 to 5 mg PO q 4 hours prn pain  Cont home gabapentin    GI:    Cardiac diet, with 1800 mL fluid restriction    Tolerating diet without complaint  + Flatus  + BM postoperatively    Continue stool softeners and prn suppository    Continue GI prophylaxis    Endo:     Pre-Op Hgb A1C: 6.1    Patient has been transitioned from continuous insulin infusion to intermittent subcutaneous dosing  Serum glucose well controlled on insulin sliding scale coverage    7    Hematology:     Post-operative acute blood loss anemia; Hemoglobin 8.8; trend prn  Iron and Vit C    8.   Disposition:        Following daily PT/OT recommendations regarding home vs. rehab when medically cleared for discharge    Pt stating she wants to give up and stop everything. Offered encouragement, educated that she is not being supported with anything that we can stop that would make her pass away. Discussed with son at bedside and he understood and agreed. Will review with Dr. Mccullough, may consider psychiatry consult/palliative care education.     D/c planning to rehab        VTE Pharmacologic Prophylaxis: Warfarin (Coumadin)  VTE Mechanical Prophylaxis: sequential compression device    Collaborative rounds completed with supervising physician  Plan of care discussed with bedside nurse    SIGNATURE: Fidencio Murillo PA-C  DATE: March 4, 2024  TIME: 9:03 AM

## 2024-03-04 NOTE — CONSULTS
Consultation - Geriatric Medicine   Natividad Ortiz 83 y.o. female MRN: [id number]  Unit/Bed#: [facility] 428-01 Encounter: [id number]        Inpatient consult to Gerontology  Consult performed by: Luis Marcum MD  Consult ordered by: Fidencio Murillo PA-C            Assessment/Plan   1.-Depression-anxiety disorder.-Patient with evidence of lack of interest or pleasure, feeling sad about her current situation, insomnia, change in appetite, low energy and motivation, slowing down of mental and physical activities.  Had lengthy conversation with family and patient.  Will start with Remeron 7.5 mg in the evening.  This medication will help with the insomnia, will increase her appetite, and will help with her depression-anxiety disorder.  Also stressed the fact that the patient needs to get out of bed and begin with her physical therapy in earnest.  Will follow her with you on a regular basis until her problem resolves.    2.-Acute kidney injury on chronic kidney disease stage IIIa.-Patient's baseline creatinine is between 0.9 and 1.1.  Patient had a hemodynamic insult i.e. hypotension and anemia contributing to her ATN.  Creatinine slowly improving at 2.5 today.  Patient's BUN remains elevated suspect secondary to diuresis will need to rule out occult GI bleed.    3.-Hyponatremia.-Metolazone discontinued patient to receive tolvaptan 7.5 mg today will continue to monitor sodium this can also play a strong role in her mood disorder.    4.-Anemia normochromic normocytic.-Patient's hemoglobin 8.5 with a hematocrit 27.  Hemoccult stools monitor closely.    5.-Atrial fibrillation.-Patient noted to have a rightward axis with ST and T wave abnormalities need to rule out inferolateral ischemia.  Currently on Coumadin INR therapeutic at present.    6.-Ambulatory dysfunction.-Patient needs to follow instructions from physical and occupational therapist.    7.-Vasculopathy.-Patient with a grade 3 atheroma in the aortic arch also  grade 3 atheroma in the descending aorta.    8.-Abnormal CT of liver.-Evidence of nodularities in the liver more impressive in the right lobe raising the suspicion of some element of cirrhosis.  There is also an element of corkscrew configuration to the hepatic arteries also raising the notion of cirrhosis.  This could be secondary to ESCALANTE.    9.-Medications at hospital.-Patient currently on amiodarone 200 mg orally every 8 hours, aspirin 81 mg orally daily, atorvastatin 80 mg orally daily, Bumex 2 mg IV 3 times daily, ferrous sulfate 325 mg orally with breakfast, Remeron 7.5 mg has been started today, nystatin powder, pantoprazole 40 mg orally daily, MiraLAX 17 g orally daily, potassium chloride 20 mill equivalents orally twice daily.  Mirapex 1 mg daily at bedtime, Senokot 1 tablet orally twice a day, tolvaptan 7.5 mg orally x 1 dose, warfarin 2.5 mg.    10.-Restless leg syndrome.-Patient had normal iron levels in the past.  Taking pramipexole.    11.-Obesity    12.-Hypothermia.-Patient given warming blanket temperature to continue to be monitored.         History of Present Illness   Physician Requesting Consult: Liang Mccullough DO  Reason for Consult / Principal Problem: Depression-anxiety  Hx and PE limited by: No limitations  Additional history obtained from: Patient to give me an excellent history patient's son and  were present in the room at the time of evaluation.      HPI: Natividad Ortiz is a 83 y.o. year old female who presents to [facility] in [place] for a surgical aortic valve replacement.  Patient has been following with cardiology with Dr. Hudson had an echocardiogram performed in June 2023 demonstrating moderate to severe aortic stenosis with a mean and peak gradients of 25 mmHg and 41 mmHg respectively.  Patient could not be considered for TAVR because of her coronary anatomy.  Patient's past medical history was relevant for obesity, atrial fibrillation persistent,  hypertension, multiple lung nodules, GERD, Carrero's esophagus, chronic venous insufficiency, lumbar radiculopathy, history of left breast lumpectomy breast cancer stage I for which she received surgical and radiation treatment in , restless leg syndrome, pacemaker placement for sick sinus syndrome.  Patient has been experiencing chest discomfort and dyspnea on exertion prior to surgery.  After surgery the patient apparently became hypotensive developed acute renal insufficiency on chronic renal failure.  She has been a stage II-3 A prior to her surgery.  Patient was admitted for surgery on 2024 and because of her complications has remained in the hospital.  Family has noticed the patient to become more depressed to the point that she does not want to continue.  Prior to surgery the patient enjoyed all of her basic and instrumental activities of daily living.  She lives in a two-story home however most of her activities are confined to the first floor.  She lives with her  and has a son that lives next door.  Patient pays the bills at home and was driving prior to surgery.  She does ambulate and uses a cane sometimes.  Patient is a former smoker but gave up tobacco approximately 25 years ago there is no history of alcohol use.  Patient's father apparently also had aortic aneurysm repair in  and  after complications from surgery.  Family tells me that the patient has had thoracentesis and after that her breathing markedly improved.  She currently is also being followed by nephrology who are monitoring her hyponatremia.    Review of Systems   Constitutional: Negative.    HENT: Negative.     Eyes: Negative.    Respiratory:  Positive for shortness of breath.    Cardiovascular:  Positive for leg swelling.   Gastrointestinal:         Patient able to move her bowels   Genitourinary:         Patient with indwelling Ledesma   Musculoskeletal:  Positive for arthralgias and gait problem.    Skin:  Positive for color change.   Psychiatric/Behavioral:  Positive for dysphoric mood and sleep disturbance. The patient is nervous/anxious.          Historical Information   Past medical history:   Past Medical History:   Diagnosis Date    Arthritis     Atrial fibrillation (HCC)     Carrero's esophagus     last assessed: 1/23/2018    BRCA1 negative     BRCA2 negative     Breast cancer ([facility]) 2006    stage 1 (left), given adjuvant radiation with Arimidex x 5 years    Cancer ([facility]) 2006    Left Breast, Lumpectomy    Cataract     last assessed: 3/11/2014    Chronic diastolic CHF (congestive heart failure) ([facility]) 11/21/2022    Dysplasia of toenail     last assessed: 8/29/2017    Esophageal reflux     GERD (gastroesophageal reflux disease)     Gross hematuria     last assessed: 2/19/2015    Hematuria     Hiatal hernia     History of radiation therapy     Hypertension     Irregular heart beat     AFIB    Mixed sensory-motor polyneuropathy     Neuropathy     Obesity     Pacemaker     Paroxysmal atrial fibrillation (HCC)     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[facility]) 02/13/2018    Peripheral neuropathy     Rectal bleeding     Restless leg syndrome     Shortness of breath     last assessed: 1/11/2016      Past surgical history:   Past Surgical History:   Procedure Laterality Date    BREAST BIOPSY Left 2006    BREAST LUMPECTOMY Left 2006    onset: 2006    BREAST SURGERY      CARDIAC CATHETERIZATION N/A 9/5/2023    Procedure: Cardiac RHC/LHC;  Surgeon: Patric England MD;  [facility]: [facility];  Service: Cardiology    CARDIAC CATHETERIZATION N/A 9/5/2023    Procedure: Cardiac Coronary Angiogram;  Surgeon: Patric England MD;  [facility];  Service: Cardiology    CARDIAC CATHETERIZATION  9/5/2023    Procedure: Cardiac catheterization;  Surgeon: Patric England MD;  [facility];  Service: Cardiology    CARDIAC PACEMAKER PLACEMENT      x 3 2006    CATARACT EXTRACTION      COLONOSCOPY      CYSTOSCOPY   2014    diagnostic    HYSTERECTOMY      ALISON BSO; due to fibroid uterus; age 42    IR THORACENTESIS  2024    KNEE CARTILAGE SURGERY      excision lesion of meniscus or capsule knee    KNEE SURGERY      OOPHORECTOMY Bilateral     age 42    OTHER SURGICAL HISTORY      radiation therapy    NH ARTHRP KNE CONDYLE&PLATU MEDIAL&LAT COMPARTMENTS Left 2020    Procedure: ARTHROPLASTY KNEE TOTAL;  Surgeon: Melquiades Sterling DO;  Location: [facility];  Service: Orthopedics    NH ARTHRP KNE CONDYLE&PLATU MEDIAL&LAT COMPARTMENTS Right 2022    Procedure: ARTHROPLASTY KNEE TOTAL W ROBOT - RIGHT;  Surgeon: Melquiades Sterling DO;  Location: [facility];  Service: Orthopedics    NH COLONOSCOPY FLX DX W/COLLJ SPEC WHEN PFRMD N/A 2017    Procedure: COLONOSCOPY;  Surgeon: Desean Ham MD;  Location: [facility];  Service: Gastroenterology    NH ESOPHAGOGASTRODUODENOSCOPY TRANSORAL DIAGNOSTIC N/A 2017    Procedure: ESOPHAGOGASTRODUODENOSCOPY (EGD);  Surgeon: Jacob Morrell MD;  [facility]: [facility];  Service: Gastroenterology    NH RPLCMT AORTIC VALVE OPN W/STENTLESS TISSUE VALVE N/A 2024    Procedure: REPLACEMENT VALVE AORTIC (AVR) WITH  21mm INSPRIS TISSUE VALVE;  Surgeon: Liang Mccullough DO;  Location:  MAIN OR;  Service: Cardiac Surgery    UPPER GASTROINTESTINAL ENDOSCOPY        Social history:  Social History     Socioeconomic History    Marital status: /Civil Union     Spouse name: Not on file    Number of children: 3    Years of education: 12    Highest education level: High school graduate   Occupational History    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      Comment: retired   Tobacco Use    Smoking status: Former     Current packs/day: 0.00     Average packs/day: 1 pack/day for 25.0 years (25.0 ttl pk-yrs)     Types: Cigarettes     Start date: 1955     Quit date: 1980     Years since quittin.4    Smokeless tobacco: Never    Tobacco comments:     Quit over 30  years ago; quit age 47   Vaping Use    Vaping status: Never Used   Substance and Sexual Activity    Alcohol use: Not Currently    Drug use: No    Sexual activity: Not on file   Other Topics Concern    Not on file   Social History Narrative         Social Determinants of Health     Financial Resource Strain: Low Risk  (11/21/2022)    Overall Financial Resource Strain (CARDIA)     Difficulty of Paying Living Expenses: Not very hard   Food Insecurity: No Food Insecurity (2/21/2024)    Hunger Vital Sign     Worried About Running Out of Food in the Last Year: Never true     Ran Out of Food in the Last Year: Never true   Transportation Needs: No Transportation Needs (2/21/2024)    PRAPARE - Transportation     Lack of Transportation (Medical): No     Lack of Transportation (Non-Medical): No   Physical Activity: Not on file   Stress: Not on file   Social Connections: Feeling Socially Integrated (1/5/2024)    [facility] from [facility]    OASIS : Social Isolation     Frequency of experiencing loneliness or isolation: Never   Intimate Partner Violence: Not on file   Housing Stability: Low Risk  (2/21/2024)    Housing Stability Vital Sign     Unable to Pay for Housing in the Last Year: No     Number of Places Lived in the Last Year: 1     Unstable Housing in the Last Year: No      Family history:   Family History   Problem Relation Age of Onset    Hypertension Mother     Heart disease Father     Aneurysm Father     Coronary artery disease Father         in his 70s with aneurysm    Aortic aneurysm Father         abdominal    Scleroderma Sister     Breast cancer Sister 68    Hypertension Sister     Cancer Sister     No Known Problems Son     No Known Problems Son     Testicular cancer Son 41    Thyroid cancer Son 38    Colon cancer Maternal Aunt     Colon cancer Maternal Aunt     Breast cancer Other 50        kaylee's daughter    Alcohol abuse Neg Hx     Substance Abuse Neg Hx     Mental illness Neg Hx      Depression Neg Hx         Meds/Allergies   All current active meds have been reviewed.     Allergies   Allergen Reactions    Latex      Added based on information entered during case entry, please review and add reactions, type, and severity as needed    Duloxetine Hcl Other (See Comments)     Facial pins and needles sensation    Erythromycin Rash    Levofloxacin Other (See Comments)     Muscular aches    Penicillins Rash    Savella [Milnacipran] Rash    Sulfa Antibiotics Rash       Objective blood pressure 124/68 heart rate 65 respiratory rate 20  Vitals:    03/04/24 1319   BP:    Pulse:    Resp:    Temp: (!) 94.4 °F (34.7 °C)   SpO2:        Intake/Output Summary (Last 24 hours) at 3/4/2024 1333  Last data filed at 3/4/2024 1137  Gross per 24 hour   Intake 946.67 ml   Output 2600 ml   Net -1653.33 ml     Invasive Devices       Peripheral Intravenous Line  Duration             Peripheral IV 03/02/24 Right;Ventral (anterior) Forearm 2 days    Peripheral IV 03/03/24 Right Antecubital <1 day              Drain  Duration             Urethral Catheter Non-latex 5 days                    Physical Exam  Constitutional:       Appearance: She is obese.   HENT:      Head: Atraumatic.      Right Ear: Ear canal and external ear normal.      Left Ear: Ear canal and external ear normal.      Nose: Nose normal.      Mouth/Throat:      Mouth: Mucous membranes are moist.   Eyes:      Conjunctiva/sclera: Conjunctivae normal.      Pupils: Pupils are equal, round, and reactive to light.   Cardiovascular:      Rate and Rhythm: Normal rate. Rhythm irregular.      Heart sounds: Normal heart sounds.   Pulmonary:      Comments: Decreased breath sounds in bases  Abdominal:      General: Bowel sounds are normal.      Palpations: Abdomen is soft.   Musculoskeletal:      Cervical back: Neck supple.      Right lower leg: Edema present.      Left lower leg: Edema present.   Skin:     General: Skin is dry.   Neurological:      Mental Status: She  is alert and oriented to person, place, and time. Mental status is at baseline.   Psychiatric:      Comments: With clinical depression         Lab Results:   I have personally reviewed pertinent lab and imaging results.     VTE Prophylaxis: Sequential compression device (Venodyne)  and Reason for no pharmacologic prophylaxis patient on Coumadin    Code Status: Level 1 - Full Code  Advance Directive and Living Will:      Power of :    POLST:      Family and Social Support: With excellent family and social support  No data recorded

## 2024-03-04 NOTE — PROGRESS NOTES
NEPHROLOGY PROGRESS NOTE   Natividad Ortiz 83 y.o. female MRN: [id number]  Unit/Bed#: [facility] 428-01 Encounter: [id number]  Reason for Consult: CHAYO    ASSESSMENT AND PLAN:  CHAYO on CKD stage III, baseline creatinine 0.9-1.1  -CHAYO suspect in the setting of hemodynamic insult, hypotension, anemia all contributing to ATN  -Peak creatinine 3.5 now continue to slowly improve to 2.5 today.  -Agree with discontinuing Bumex drip, diuretics as below.  -Avoid nephrotoxins or NSAIDs.  -Closely monitor for uremic symptoms.    Severe azotemia  -Suspect in the setting of aggressive diuresis, rule out GI bleed  -Check FOBT  -Diuretics reduced as below  -Closely monitor for any developing uremic symptoms.  -Patient is active, alert at the time of my encounter    hyponatremia  -Likely hypervolemic, use of metolazone  -Sodium level remains lower and plateaued around 1 25-1 26 without any improvement over last several days  -Agree with discontinuing metolazone  -Bumex as below  -Will give tolvaptan 7.5 mg 1 dose today.  Monitor off fluid restriction while on tolvaptan  -Repeat serum sodium later this evening    Hypokalemia, improved most recent serum potassium 3.9.    Anemia in acute illness, transfuse as needed for hemoglobin less than 7.    Volume overload, prior echo shows EF 55%  -Clinically seems hypervolemic.  -Currently remains on 3 L O2 via nasal cannula at the time of encounter  -Now remains off metolazone given persistent hyponatremia.  Remains off Bumex drip which is changed to Bumex IV bolus.  -Will give tolvaptan 1 dose as above.    Severe AS, status post aortic valve replacement   -Status post thoracentesis on 2/28/2024.    Discussed above plan in detail with primary team regarding monitoring BMP, discontinue metolazone, 1 dose of tolvaptan and checking stool occult blood and they agree with above recommendations.      SUBJECTIVE:  Patient seen and examined at bedside.  She remains on 3 to O2 via nasal cannula.  Denies any  nausea or vomiting.  She feels like she does not want to continue to pursue ongoing aggressive care    OBJECTIVE:  Current Weight: Weight - Scale: 109 kg (240 lb 8.4 oz)  Vitals:    03/04/24 1153   BP:    Pulse:    Resp:    Temp: (!) 95.7 °F (35.4 °C)   SpO2:        Intake/Output Summary (Last 24 hours) at 3/4/2024 1239  Last data filed at 3/4/2024 1137  Gross per 24 hour   Intake 946.67 ml   Output 2600 ml   Net -1653.33 ml     Wt Readings from Last 3 Encounters:   03/04/24 109 kg (240 lb 8.4 oz)   02/02/24 106 kg (232 lb 12.8 oz)   01/31/24 104 kg (230 lb 3.2 oz)     Temp Readings from Last 3 Encounters:   03/04/24 (!) 95.7 °F (35.4 °C) (Rectal)   01/31/24 (!) 96.8 °F (36 °C) (Tympanic)   01/25/24 (!) 97.3 °F (36.3 °C)     BP Readings from Last 3 Encounters:   03/04/24 124/86   02/02/24 114/76   01/31/24 134/84     Pulse Readings from Last 3 Encounters:   03/04/24 67   02/02/24 81   01/31/24 90        Physical Examination:  Eyes: Mild conjunctival pallor present  Neck: No obvious lymphadenopathy appreciated  Respiratory: Decreased breath sound at base  CVS: Trace to 1+ edema in legs  GI: Soft, nondistended  CNS: Active, alert, follows commands  Skin: No new rash  Musculoskeletal: No obvious new gross deformity noted    Medications:    Current Facility-Administered Medications:     acetaminophen (TYLENOL) rectal suppository 650 mg, 650 mg, Rectal, Q4H PRN, Yari Peñaloza PA-C    acetaminophen (TYLENOL) tablet 650 mg, 650 mg, Oral, Q6H While awake, Yari Peñaloza PA-C, 650 mg at 03/04/24 0821    amiodarone tablet 200 mg, 200 mg, Oral, Q8H ANTON, Yari Peñaloza PA-C, 200 mg at 03/04/24 0620    aspirin (ECOTRIN LOW STRENGTH) EC tablet 81 mg, 81 mg, Oral, Daily, Yari Peñaloza PA-C, 81 mg at 03/04/24 1155    atorvastatin (LIPITOR) tablet 80 mg, 80 mg, Oral, Daily With Dinner, Yari Peñaloza PA-C, 80 mg at 03/03/24 1708    bisacodyl (DULCOLAX) EC tablet 5 mg, 5 mg, Oral, Daily PRN, Yari Sen  BARB Peñaloza    bisacodyl (DULCOLAX) rectal suppository 10 mg, 10 mg, Rectal, Daily PRN, Yari Peñaloza PA-C, 10 mg at 02/27/24 1104    bumetanide (BUMEX) injection 2 mg, 2 mg, Intravenous, TID, Fidencio Murillo PA-C, 2 mg at 03/04/24 1032    chlorhexidine (PERIDEX) 0.12 % oral rinse 15 mL, 15 mL, Mouth/Throat, Q12H ANTON, Yari Peñaloza PA-C, 15 mL at 03/03/24 2102    ferrous sulfate tablet 325 mg, 325 mg, Oral, Daily With Breakfast, Yari Peñaloza PA-C, 325 mg at 03/03/24 0806    insulin lispro (HumaLOG) 100 units/mL subcutaneous injection 1-5 Units, 1-5 Units, Subcutaneous, TID AC, 1 Units at 03/02/24 1221 **AND** Fingerstick Glucose (POCT), , , TID AC, Yari Peñaloza PA-C    insulin lispro (HumaLOG) 100 units/mL subcutaneous injection 1-5 Units, 1-5 Units, Subcutaneous, HS, Yari Peñaloza PA-C, 1 Units at 03/02/24 2141    levalbuterol (XOPENEX) inhalation solution 1.25 mg, 1.25 mg, Nebulization, BID, Yari Peñaloza PA-C, 1.25 mg at 03/04/24 0710    magnesium hydroxide (MILK OF MAGNESIA) oral suspension 30 mL, 30 mL, Oral, Daily PRN, Yari Peñaloza PA-C, 30 mL at 02/27/24 1726    metoclopramide (REGLAN) injection 10 mg, 10 mg, Intravenous, Q6H PRN, Yari Peñaloza PA-C    metoprolol tartrate (LOPRESSOR) tablet 25 mg, 25 mg, Oral, Q12H ANTON, Yari Peñaloza PA-C, 25 mg at 03/03/24 2102    nystatin (MYCOSTATIN) powder, , Topical, BID, Yari Peñaloza PA-C, Given at 03/04/24 1154    ondansetron (ZOFRAN) injection 4 mg, 4 mg, Intravenous, Q6H PRN, Yari Peñaloza PA-C    oxyCODONE (ROXICODONE) split tablet 2.5 mg, 2.5 mg, Oral, Q4H PRN **OR** oxyCODONE (ROXICODONE) IR tablet 5 mg, 5 mg, Oral, Q4H PRN, Yari Peñaloza PA-C, 5 mg at 02/22/24 1843    pantoprazole (PROTONIX) EC tablet 40 mg, 40 mg, Oral, Early Morning, Yari Peñaloza PA-C, 40 mg at 03/04/24 0620    polyethylene glycol (MIRALAX) packet 17 g, 17 g, Oral, Daily, Yari Peñaloza PA-C, 17 g at  02/27/24 0839    potassium chloride (Klor-Con M20) CR tablet 20 mEq, 20 mEq, Oral, BID, Yari Peñaloza PA-C, 20 mEq at 03/03/24 1708    pramipexole (MIRAPEX) tablet 1 mg, 1 mg, Oral, HS, Yari Peñaloza PA-C, 1 mg at 03/03/24 2102    senna-docusate sodium (SENOKOT S) 8.6-50 mg per tablet 1 tablet, 1 tablet, Oral, BID, Yari Peñaloza PA-C, 1 tablet at 03/02/24 1737    sodium chloride 3 % inhalation solution 4 mL, 4 mL, Nebulization, BID, Yari Peñaloza PA-C, 4 mL at 03/04/24 0710    temazepam (RESTORIL) capsule 15 mg, 15 mg, Oral, HS PRN, Yari Peñaloza PA-C    warfarin (COUMADIN) tablet 2.5 mg, 2.5 mg, Oral, Once (warfarin), Fidencio Murillo PA-C    Laboratory Results:  Results from last 7 days   Lab Units 03/04/24  0424 03/03/24  1639 03/03/24  1017 03/03/24  0439 03/02/24  2244 03/02/24  1730 03/02/24  1027 03/02/24  0444 03/01/24  1152 03/01/24  0422 02/29/24  1638 02/29/24  0426 02/29/24  0000 02/28/24  1756 02/28/24  1403 02/28/24  0859 02/28/24  0859 02/28/24  0610 02/27/24  1439 02/27/24  0443   WBC Thousand/uL 10.92*  --   --  10.84*  --   --   --  11.06*  --  10.97*  --  13.28*  --   --   --   --   --  13.40*  --  12.82*   HEMOGLOBIN g/dL 8.5*  --   --  8.8*  --   --   --  8.7*  --  8.5*  --  8.4*  --   --   --   --   --  8.9*  --  7.5*   I STAT HEMOGLOBIN g/dl  --   --   --   --   --   --   --   --   --   --   --   --   --   --  9.9*  --  10.9*  --   --   --    HEMATOCRIT % 27.0*  --   --  27.4*  --   --   --  28.4*  --  27.6*  --  28.7*  --   --   --   --   --  30.1*  --  24.2*   HEMATOCRIT, ISTAT %  --   --   --   --   --   --   --   --   --   --   --   --   --   --  29*  --  32*  --   --   --    PLATELETS Thousands/uL 300  --   --  322  --   --   --  319  --  307  --  301  --   --   --   --   --  305  --  247   SODIUM mmol/L 125* 126* 125* 125* 124* 127* 126* 125*   < > 127*   < >  --  132*   < >  --   --   --   --    < > 133*   POTASSIUM mmol/L 3.9 3.6 3.5 3.4* 3.4* 3.5 3.5 3.6    < > 4.0   < >  --  4.4   < >  --   --   --   --    < > 4.5   CHLORIDE mmol/L 76* 74* 76* 76* 76* 81* 79* 79*   < > 81*   < >  --  85*   < >  --   --   --   --    < > 87*   CO2 mmol/L 34* 35* 34* 33* 32 33* 33* 32   < > 30   < >  --  35*   < >  --   --   --   --    < > 36*   CO2, I-STAT mmol/L  --   --   --   --   --   --   --   --   --   --   --   --   --   --  38*   < > 35*  --   --   --    BUN mg/dL 144* 138* 136* 144* 143* 137* 133* 136*   < > 126*   < >  --  114*   < >  --   --   --   --    < > 96*   CREATININE mg/dL 2.50* 2.75* 2.86* 3.07* 2.94* 2.95* 2.84* 2.90*   < > 3.10*   < >  --  3.27*   < >  --   --   --   --    < > 2.61*   CALCIUM mg/dL 8.8 9.1 9.1 8.9 8.5 8.4 9.0 8.9   < > 8.7   < >  --  8.4   < >  --   --   --   --    < > 8.8   MAGNESIUM mg/dL  --   --   --   --   --   --   --  2.8*  --  3.0*  --   --  2.8*  --   --   --   --   --   --   --    GLUCOSE, ISTAT mg/dl  --   --   --   --   --   --   --   --   --   --   --   --   --   --  114  --  152*  --   --   --     < > = values in this interval not displayed.       US kidney and bladder   Final Result by Yaquelin Espino MD (03/01 2157)      No hydronephrosis.      Nondiagnostic evaluation of the urinary bladder as it is collapsed around a Ledesma catheter.      Trace free fluid is noted in the right side of the pelvis.            Workstation performed: [id number]         XR chest portable ICU   Final Result by Raffi Arora MD (02/29 6497)      No significant change.      Persistent dense  left  lung base opacity and hazy right lung base opacity which may represent a combination of effusion and parenchymal opacity.      Enlarged cardiac silhouette. Pulmonary vascular congestion.               Workstation performed: [id number]         IR IN-Patient Thoracentesis   Final Result by Grupo Bearden MD (02/29 9097)   Impression:      Successful ultrasound-guided thoracentesis.      Signed, performed, and dictated by Jo Sood PA-C under the direct  supervision of Dr. Grupo Bearden.      Workstation performed: [id number]         XR chest portable   Final Result by Marciano Patterson MD (02/28 1035)      Findings most suggestive of pulmonary edema with pleural effusions, right side more severe than left. Cardiomegaly. Postop changes of the heart. Left-sided pacer.            Workstation performed: [id number]         XR chest portable   Final Result by Raffi Arora MD (02/24 1513)      Stable small right apical pneumothorax.      Persistent dense right and left lung base opacity which may represent a combination of effusion and parenchymal opacity      Workstation performed: [id number]         XR chest portable ICU   Final Result by Wally Foster DO (02/23 1547)      Small right apical pneumothorax.      Stable appearance of pulmonary vascular congestion with bilateral layering pleural effusions.      I personally discussed this study with MARGOTH VIEIRA on 2/23/2024 3:46 PM.            Resident: MARLENI TAYLOR I, the attending radiologist, have reviewed the images and agree with the final report above.      Workstation performed: [id number]         XR chest portable   Final Result by Valentin Courtney MD (02/23 0948)   Diminished pulmonary edema and bilateral pleural effusions      Stable cardiomegaly status post CABG         Workstation performed: [id number]         XR chest portable   Final Result by Sheldon Mccormick MD (02/20 0827)      1. Increasing mild to moderate pulmonary edema. Increasing small-moderate layering pleural effusions with bibasilar atelectasis.      2. Stable moderate enlargement of the cardiac silhouette allowing for differences in technique.      3. ETT now in satisfactory position with tip approximately 3 cm above the orquidea. Other support devices as above.      The study was marked in EPIC for immediate notification.         Workstation performed: [id number]         XR chest portable ICU   Final Result by Sheldon HENDRICKS  "MD Anny (02/20 0823)      1. Low positioning of the ETT, 1.5 cm above the orquidea, which has been repositioned on the follow-up radiograph. Other support devices in satisfactory position.      2. Mild pulmonary edema and similar moderate enlargement of the cardiac silhouette.      3. No pneumothorax.            Workstation performed: [id number]             Portions of the record may have been created with voice recognition software. Occasional wrong word or \"sound a like\" substitutions may have occurred due to the inherent limitations of voice recognition software. Read the chart carefully and recognize, using context, where substitutions have occurred.    "

## 2024-03-04 NOTE — PLAN OF CARE
Problem: PHYSICAL THERAPY ADULT  Goal: Performs mobility at highest level of function for planned discharge setting.  See evaluation for individualized goals.  Description: Treatment/Interventions: Functional transfer training, LE strengthening/ROM, Therapeutic exercise, Endurance training, Bed mobility, Gait training, Spoke to nursing, OT          See flowsheet documentation for full assessment, interventions and recommendations.  Outcome: Progressing  Note: Prognosis: Guarded  Problem List: Decreased strength, Decreased endurance, Impaired balance, Decreased mobility, Obesity  Assessment: Bed level LE therex and extensive education performed; pt appears to be frustrated and discouraged and declines mobilization at this time; encouragement and re-assurance provided w/ attempt to facilitate motivation and participation in activities; pt expressed appreciation and agreed to perform therex at that time; will cont to follow pt in PT for graded mobilization as clinical course allows; recommend rehab upon D/C when medically cleared  Barriers to Discharge: Inaccessible home environment, Decreased caregiver support     Rehab Resource Intensity Level, PT: I (Maximum Resource Intensity)    See flowsheet documentation for full assessment.

## 2024-03-04 NOTE — PLAN OF CARE
Problem: PHYSICAL THERAPY ADULT  Goal: Performs mobility at highest level of function for planned discharge setting.  See evaluation for individualized goals.  Description: Treatment/Interventions: Functional transfer training, LE strengthening/ROM, Therapeutic exercise, Endurance training, Bed mobility, Gait training, Spoke to nursing, OT          See flowsheet documentation for full assessment, interventions and recommendations.  3/4/2024 1514 by Dimitry Bocanegra, PT  Outcome: Progressing  Note: Prognosis: Guarded  Problem List: Decreased strength, Decreased endurance, Impaired balance, Decreased mobility, Obesity  Assessment: Functional mobility training performed this PM to facilitate OOB to chair transition per nsg requested; pt required (A)x2 w/ all aspects of mobility and was able to take a few shuffling steps w/ no increased discomfort or excessive fatigue expressed; overall, cont to recommend rehab upon D/C; will follow  Barriers to Discharge: Inaccessible home environment, Decreased caregiver support     Rehab Resource Intensity Level, PT: I (Maximum Resource Intensity)    See flowsheet documentation for full assessment.

## 2024-03-04 NOTE — OCCUPATIONAL THERAPY NOTE
Occupational Therapy Progress Note     Patient Name: Natividad Ortiz  Today's Date: 3/4/2024  Problem List  Principal Problem:    S/P AVR  Active Problems:    Hiatal hernia    Atrial fibrillation (HCC) [I48.91]    Acquired deformity of foot    Diabetic polyneuropathy associated with type 2 diabetes mellitus (HCC)    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HCC)    RLS (restless legs syndrome)    Essential hypertension    Multiple lung nodules    Severe obesity (BMI 35.0-39.9) with comorbidity (HCC)    GERD (gastroesophageal reflux disease)    Chronic edema    Deep venous insufficiency    Stage 3a chronic kidney disease (HCC)    Chronic diastolic CHF (congestive heart failure) (HCC)    Aortic stenosis, severe              03/04/24 1020   OT Last Visit   OT Visit Date 03/04/24   Note Type   Note Type Treatment   Pain Assessment   Pain Assessment Tool 0-10   Pain Score No Pain   Restrictions/Precautions   Weight Bearing Precautions Per Order No   Other Precautions Cardiac/sternal;Multiple lines;Telemetry;O2;Fall Risk;Hard of hearing   Lifestyle   Autonomy Pta pt I with ADL, IADL and fxnal mobility using cane, (+)    Reciprocal Relationships supportive spouse   Service to Others retired   Intrinsic Gratification enjoys watching TV   ADL   Where Assessed Supine, bed   Grooming Assistance 5  Supervision/Setup   Grooming Deficit Setup;Verbal cueing;Supervision/safety;Teeth care   Grooming Comments Pt requiring setup, supervision for supine grooming to brush teeth and wash face   LB Dressing Assistance 2  Maximal Assistance   LB Dressing Deficit Don/doff R sock;Don/doff L sock   Bed Mobility   Additional Comments Pt greeted and left in bed. Spent extensive time building rapport/emotional support with pt and providing education on the importance of movement/therapy in recovery after cardiac surgery although pt ultimately refusing to engage in bed mobility/transfers, completed session in supine position.   Transfers  "  Additional Comments pt refuses at this time   Functional Mobility   Additional Comments pt refuses at this time   Subjective   Subjective \"I just can't do this anymore\"   Cognition   Overall Cognitive Status WFL  (with higher level deficits, decreased initiation)   Arousal/Participation Responsive   Attention Attends with cues to redirect   Orientation Level Oriented to person;Oriented to place;Oriented to situation   Memory Decreased recall of recent events;Decreased recall of precautions   Following Commands Follows one step commands with increased time or repetition   Comments Pt cooperative to supine ADLs although refuses to perform bed mobility or transfers, presenting as depressed although pt reporting her problems are not mental but physical, stating \"I can't describe it\". Pt with deficits in initiation, benefits from emotional support throughout.   Additional Activities   Additional Activities Other (Comment)  (Level 2 Activity Book)   Additional Activities Comments Pt provided Level 2 Activity Book to engage in word searches and coloring pages although pt reporting, \"I don't need that\". Educated pt on the importance of keeping her mind sharp while refusing to perform bed level and other fxnal mobility.   Activity Tolerance   Activity Tolerance Patient limited by fatigue   Medical Staff Made Aware DPT present as attempted to mobilize pt, RN cleared for therapy   Assessment   Assessment Pt seen for OT treatment session day 3 on this date focused on ADL retraining, education, and activity engagement. Pt was greeted in bed and was not agreeable to bed mobility and transfers although was cooperative to supine ADLs during session. Following session, pt was left in bed with bed alarm on and all needs within reach. Pt continues to be limited by functional status related to ADLs and transfers requiring MAX A for LB dressing, setup/positioning for supine grooming. Continue to provide education and vc for " encouragement regarding mobilization with therapy.   The patient's raw score on the AM-PAC Daily Activity Inpatient Short Form is 14. A raw score of less than 19 suggests the patient may benefit from discharge to post-acute rehabilitation services. Please refer to the recommendation of the Occupational Therapist for safe discharge planning. Pt would benefit from continued acute OT intervention with plan to continue OT treatment sessions 2-3x per week. Recommend d/c to post acute rehab services.   Plan   Treatment Interventions ADL retraining;Continued evaluation;Activityengagement   Goal Expiration Date 03/08/24   OT Treatment Day 3   OT Frequency 2-3x/wk   Discharge Recommendation   Rehab Resource Intensity Level, OT II (Moderate Resource Intensity)   AM-PAC Daily Activity Inpatient   Lower Body Dressing 2   Bathing 2   Toileting 2   Upper Body Dressing 2   Grooming 3   Eating 3   Daily Activity Raw Score 14   Daily Activity Standardized Score (Calc for Raw Score >=11) 33.39   AM-PAC Applied Cognition Inpatient   Following a Speech/Presentation 3   Understanding Ordinary Conversation 4   Taking Medications 3   Remembering Where Things Are Placed or Put Away 3   Remembering List of 4-5 Errands 2   Taking Care of Complicated Tasks 2   Applied Cognition Raw Score 17   Applied Cognition Standardized Score 36.52   End of Consult   Education Provided Yes   Patient Position at End of Consult Supine;All needs within reach;Bed/Chair alarm activated   Nurse Communication Nurse aware of consult       MIGUEL Taylor, OTR/L

## 2024-03-05 NOTE — PROGRESS NOTES
Progress Note - Geriatric Medicine   Natividad Ortiz 83 y.o. female MRN: [id number]  Unit/Bed#: [facility] 428-01 Encounter: [id number]      Assessment/Plan:  1.-Hyponatremia-hypochloremia.-Patient's sodium 126 chloride 76 would decrease diuretic usage suspect patient is becoming more intravascularly depleted.  Nephrology addressing the issue of the hyponatremia and the acute on chronic renal insufficiency.    2.-Depression-anxiety.-Patient started on Remeron last night she tells me she did not sleep a little better.  She still manifesting that she wants to give up.  Strongly encouraged her to become more active with PT and OT and to get out of bed if possible.    3.-Hypothermia.-Will need to look to make sure the patient does not developing sepsis.  Patient's white count is 12,340 we need to see the differential.  Differential diagnoses were hypothermic could be severe infection, endocrine abnormalities.  Would recommend checking a urine and blood cultures.  Will order for tomorrow CBC with differential.  Check CMP check TSH.    4.-Anemia normochromic normocytic.-Patient's hemoglobin 8.9 with hematocrit 28.9 this has remained relatively stable.    5.-Acute on chronic renal insufficiency.-Currently being followed by nephrology    6.-Atrial fibrillation.-Patient currently receiving Coumadin try to maintain INR is between 2 and 3.    7.-Abnormal CT of the liver.-Patient with evidence of nodularities in the liver more impressive on the right lobe raising suspicion of some element of cirrhosis.  Patient also had an element of corkscrew configuration to the hepatic arteries also raising the notion of cirrhosis.    Subjective:   Patient states she slept a little better still does not feel well.    Review of Systems   Constitutional: Negative.    HENT: Negative.     Eyes: Negative.    Respiratory:  Positive for shortness of breath.    Endocrine: Negative.    Skin:  Positive for pallor.   Neurological:  Positive for weakness.  "  Psychiatric/Behavioral:  Positive for sleep disturbance. The patient is nervous/anxious.          Objective:     Vitals: Blood pressure 122/63, pulse 78, temperature (!) 96 °F (35.6 °C), temperature source Axillary, resp. rate 14, height 5' 4\" (1.626 m), weight 109 kg (240 lb 8.4 oz), SpO2 97%.,Body mass index is 41.29 kg/m².      Intake/Output Summary (Last 24 hours) at 3/5/2024 0849  Last data filed at 3/5/2024 0545  Gross per 24 hour   Intake 878.67 ml   Output 1725 ml   Net -846.33 ml       Current Medications: Reviewed    Physical Exam:   Physical Exam  Constitutional:       Appearance: She is obese.   HENT:      Head: Atraumatic.      Mouth/Throat:      Mouth: Mucous membranes are dry.   Eyes:      Pupils: Pupils are equal, round, and reactive to light.   Cardiovascular:      Rate and Rhythm: Normal rate. Rhythm irregular.      Heart sounds: Normal heart sounds.   Pulmonary:      Breath sounds: Rhonchi present.   Abdominal:      General: Bowel sounds are normal.      Palpations: Abdomen is soft.   Musculoskeletal:      Cervical back: Neck supple.      Right lower leg: Edema present.      Left lower leg: Edema present.   Neurological:      Mental Status: She is oriented to person, place, and time.   Psychiatric:      Comments: Patient feeling down does not want to live.          Invasive Devices       Peripheral Intravenous Line  Duration             Peripheral IV 03/02/24 Right;Ventral (anterior) Forearm 3 days    Peripheral IV 03/03/24 Right Antecubital 1 day              Drain  Duration             Urethral Catheter Non-latex 5 days                    Lab, Imaging and other studies: I have personally reviewed pertinent reports.      "

## 2024-03-05 NOTE — PROGRESS NOTES
Progress Note - Cardiothoracic Surgery   Natividad Ortiz 83 y.o. female MRN: [id number]  Unit/Bed#: [facility] 428-01 Encounter: [id number]    Severe AS S/P AVR 21mm Inspiris; POD # 14      24 Hour Events: Seen by geriatrics yest for situational depression/anxiety and wanting to give up. Counseled and started on remeron HS. Na down to 125 yest, transitioned of bumex gtt yest, metolazone stopped, and given samsca 7.5 mg x1. Na up to 128 last PM. Low body temps requiring warming blanket. Controlled afib, no tele events, on 2LNC. No appetite, refusing to eat, conts to feel very poor. BUN up to 157 (144, 138), likely cause of how she's feeling, as well NA remaining low at 126 this AM.     Medications:   Scheduled Meds:  Current Facility-Administered Medications   Medication Dose Route Frequency Provider Last Rate    acetaminophen  650 mg Rectal Q4H PRN Yari Peñaloza PA-C      acetaminophen  650 mg Oral Q6H While awake Yari Peñaloza PA-C      amiodarone  200 mg Oral Q8H ANTON Yari Peñaloza PA-C      aspirin  81 mg Oral Daily Yari Peñaloza PA-C      atorvastatin  80 mg Oral Daily With Dinner Yari Peñaloza PA-C      bisacodyl  5 mg Oral Daily PRN Yari Peñaloza PA-C      bisacodyl  10 mg Rectal Daily PRN Yari Peñaloza PA-C      bumetanide  2 mg Intravenous TID Fidencio Murillo PA-C      chlorhexidine  15 mL Mouth/Throat Q12H ANTON Yari Peñaloza PA-C      ferrous sulfate  325 mg Oral Daily With Breakfast Yari Peñaloza PA-C      insulin lispro  1-5 Units Subcutaneous TID AC Yari Peñaloza PA-C      insulin lispro  1-5 Units Subcutaneous HS Yari Peñaloza PA-C      levalbuterol  1.25 mg Nebulization BID Yari Peñaloza PA-C      magnesium hydroxide  30 mL Oral Daily PRN Yari Peñaloza PA-C      metoclopramide  10 mg Intravenous Q6H PRN Yari Peñaloza PA-C      metoprolol tartrate  25 mg Oral Q12H ANTON Yari Peñaloza PA-C      mirtazapine  7.5 mg Oral HS  Fidencio Murillo PA-C      nystatin   Topical BID Yari Peñaloza PA-C      ondansetron  4 mg Intravenous Q6H PRN Yari Peñaloza PA-C      oxyCODONE  2.5 mg Oral Q4H PRN Yari Peñaloza PA-C      Or    oxyCODONE  5 mg Oral Q4H PRN Yari Peñaloza PA-C      pantoprazole  40 mg Oral Early Morning Yari Peñaloza PA-C      polyethylene glycol  17 g Oral Daily Yari Peñaloza PA-C      potassium chloride  20 mEq Oral BID Yari Peñaloza PA-C      pramipexole  1 mg Oral HS Yari Peñaloza PA-C      senna-docusate sodium  1 tablet Oral BID Yari Peñaloza PA-C      sodium chloride  4 mL Nebulization BID Yari Peñaloza PA-C      temazepam  15 mg Oral HS PRN Yari Peñaloza PA-C       Continuous Infusions:       PRN Meds:.  acetaminophen    bisacodyl    bisacodyl    magnesium hydroxide    metoclopramide    ondansetron    oxyCODONE **OR** oxyCODONE    temazepam    Vitals:   Vitals:    03/05/24 0000 03/05/24 0020 03/05/24 0247 03/05/24 0509   BP:  106/60 105/61    BP Location:       Pulse:   66    Resp:   16    Temp: (!) 96.2 °F (35.7 °C)  (!) 95.9 °F (35.5 °C) (!) 97.4 °F (36.3 °C)   TempSrc: Axillary  Axillary Oral   SpO2:   99%    Weight:       Height:           Telemetry: Atrial Fibrillation; Heart Rate: 60-70s    Respiratory:   SpO2: SpO2: 99 %; 2 LPM    Intake/Output:     Intake/Output Summary (Last 24 hours) at 3/5/2024 0703  Last data filed at 3/5/2024 0545  Gross per 24 hour   Intake 978.67 ml   Output 2175 ml   Net -1196.33 ml   UO 375ml    Chest tube Output:  removed    Weights:   Weight (last 2 days)       Date/Time Weight    03/04/24 0703 109 (240.52)    03/04/24 0537 112 (247.58)    03/03/24 0600 108 (238.98)              Results:   Results from last 7 days   Lab Units 03/05/24  0453 03/04/24  0424 03/03/24  0439   WBC Thousand/uL 12.34* 10.92* 10.84*   HEMOGLOBIN g/dL 8.9* 8.5* 8.8*   HEMATOCRIT % 28.9* 27.0* 27.4*   PLATELETS Thousands/uL 334 300 322     Results  from last 7 days   Lab Units 03/04/24 2039 03/04/24 0424 03/03/24  1639 03/03/24  1017 02/28/24  1756 02/28/24  1403   SODIUM mmol/L 128* 125* 126* 125*   < >  --    POTASSIUM mmol/L  --  3.9 3.6 3.5   < >  --    CHLORIDE mmol/L  --  76* 74* 76*   < >  --    CO2 mmol/L  --  34* 35* 34*   < >  --    CO2, I-STAT mmol/L  --   --   --   --   --  38*   BUN mg/dL  --  144* 138* 136*   < >  --    CREATININE mg/dL  --  2.50* 2.75* 2.86*   < >  --    GLUCOSE, ISTAT mg/dl  --   --   --   --   --  114   CALCIUM mg/dL  --  8.8 9.1 9.1   < >  --     < > = values in this interval not displayed.     Results from last 7 days   Lab Units 03/05/24 0453 03/04/24 0424 03/03/24  0439   INR  2.04* 2.00* 2.13*         Date:   INR:  Coumadin Dose:  3/5  2.04  2.5  3/4  2.00  2.5   3/3  2.13  2.5  3/2  2.46  1  3/1  2.91  0  2/29  2.96  1  2/28  2.35  2.5  2/27  1.88  5  2/26  1.61  5  2/25  1.71  5  2/24  1.83  2.5  2/23  1.86  2.5  2/22  1.93  0  2/21  1.97  2.5            Point of care glucose: 112 - 177    Studies:  None today    I have personally reviewed pertinent reports.   and I have personally reviewed pertinent films in PACS    Invasive Lines/Tubes:  Invasive Devices       Peripheral Intravenous Line  Duration             Peripheral IV 03/02/24 Right;Ventral (anterior) Forearm 3 days    Peripheral IV 03/03/24 Right Antecubital 1 day              Drain  Duration             Urethral Catheter Non-latex 5 days                    Physical Exam:    General:  Elderly, ill appearing  HEENT/NECK:  Normocephalic. Atraumatic.   Cardiac: Irregularly irregular rate and rhythm  Pulmonary:  Poor inspiratory effort  Abdomen:  Non-tender and Non-distended  Incisions: Sternum is stable.  Incision is clean, dry, and intact.   Extremities: 1+ edema B/L  Neuro: No focal deficits  Skin: Warm/Dry, without rashes or lesions.       Assessment:  Principal Problem:    S/P AVR  Active Problems:    Hiatal hernia    Atrial fibrillation (HCC) [I48.91]     Acquired deformity of foot    Diabetic polyneuropathy associated with type 2 diabetes mellitus (HCC)    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HCC)    RLS (restless legs syndrome)    Essential hypertension    Multiple lung nodules    Severe obesity (BMI 35.0-39.9) with comorbidity (HCC)    GERD (gastroesophageal reflux disease)    Chronic edema    Deep venous insufficiency    Stage 3a chronic kidney disease (HCC)    Chronic diastolic CHF (congestive heart failure) (HCC)    Aortic stenosis, severe       Severe AS S/P AVR 21mm Inspiris; POD # 14    Plan:    Cardiac:     Elective surgical admission  Normal ventricular systolic function, EF 55%  Chronic diastolic CHF prior to admit  Post op volume overload    Atrial Fibrillation; HR well-controlled  BP well-controlled    Continue Lopressor, 25mg PO BID     Hold ACE inhibitor/ARB secondary to renal dysfunction    Continue prophylactic Amiodarone, decrease to 200 mg PO daily    Anticoagulated for Afib  INR 2.04, Dose 2.5mg Coumadin today    Continue ASA 81 and Statin therapy    Epicardial pacing wires have been removed    TLC removed    Continue Coumadin for DVT prophylaxis    Pulmonary:   Post op acute hypoxic resp failure,resolved    Acute post-op pulmonary insufficiency; Requiring 3 liters via nasal cannula, secondary to atelectasis, pulmonary vascular congestion, poor inspiratory effort secondary to pain, and body habitus/obesity. Continue incentive spirometry/coughing/deep breathing exercises.  Wean supplemental oxygen as tolerated for saturation > 90%    Chest tubes have been discontinued    Post op right pleural effusion s/p right thoracentesis for 950cc 2/28      Renal:     Postoperative CHAYO, improving  with Cr 2.47 , from 2.50, 2.75, 2.86, 3.07, pk 3.55  baseline 1.1  Follow up daily BMP  Renal following  Significant elevated BUN at 157 (144, 138), likely cause of her symptoms  Discussed with renal    Post op volume overload  Intake/Output net: -1166 mL/24  hours    Diuretic Regimen:  Hold Bumex today   Metolazone stopped, hold further  Start IVFs at 50cc/hr x 1 Liter    Hypokalemia  Hyponatremia, given samsca by renal, trend    Neuro:  Very weak and significant deconditioning.     Situational depression, seen by Geriatrics, started on remeron to help with depression/anxiety, sleep, and appetite.     Hypothermia - treat with warming blanket, poss related to malnutrition, uremia?    Neurologically intact; No active issues     Incisional pain well controlled   Continue tylenol, 975 mg PO q 8, standing dose   Continue oxycodone, 2.5 to 5 mg PO q 4 hours prn pain  Cont home gabapentin    GI:    Cardiac diet, with 1800 mL fluid restriction    Poor appetite, not eating, and likely malnourished  Likely related to high BUN and deconditioning    Consult nutrition services    Continue stool softeners and prn suppository    Continue GI prophylaxis    Endo:     Pre-Op Hgb A1C: 6.1    Patient has been transitioned from continuous insulin infusion to intermittent subcutaneous dosing  Serum glucose well controlled on insulin sliding scale coverage    7    Hematology:     Post-operative acute blood loss anemia; Hemoglobin 8.9; trend prn  Iron and Vit C    8.   Disposition:        Following daily PT/OT recommendations regarding rehab when medically cleared for discharge    Need improvement in Na, BUN, appetite    D/c planning to rehab eventually when medically cleared        VTE Pharmacologic Prophylaxis: Warfarin (Coumadin)  VTE Mechanical Prophylaxis: sequential compression device    Collaborative rounds completed with supervising physician  Plan of care discussed with bedside nurse    SIGNATURE: Fidencio Murillo PA-C  DATE: March 5, 2024  TIME: 7:03 AM

## 2024-03-05 NOTE — PROGRESS NOTES
NEPHROLOGY PROGRESS NOTE   Natividad Ortiz 83 y.o. female MRN: [id number]  Unit/Bed#: [facility] 428-01 Encounter: [id number]  Reason for Consult: CHAYO    ASSESSMENT AND PLAN:  CHAYO on CKD stage III, baseline creatinine 0.9-1.1  -CHAYO suspect in the setting of hemodynamic insult, hypotension, anemia all contributing to ATN  -Peak creatinine 3.5 now continue to slowly improve to 2.4 today.  =-Now remains off diuretics for time being given significantly worsening azotemia.  -Currently has Ledesma catheter, good urine output over last 24 hours.   -Avoid nephrotoxins or NSAIDs.  -Closely monitor for uremic symptoms.     Severe azotemia  -Suspect in the setting of aggressive diuresis, rule out GI bleed  -Check FOBT (not done yet)  -BUN continue to progressively worsen up to 157  -Receiving gentle IV fluid trial for 1 L today   -concerned if patient developing uremic symptoms since patient has significantly reduced appetite, very poor p.o. intake, overall lethargic/drowsy, significantly tired with component of confusion  -Given concern for uremic symptoms given significantly worsening azotemia, I had detailed discussion with patient, her son Vicente (DORINDA) and patient's  at bedside.  Discussed likely need for dialysis if no improvement in symptoms with significant worsening azotemia and concern for uremic symptoms.  Patient clearly refuses to consider going on dialysis.  As per son and family they do not want to pursue dialysis if ever needed and understands risk of not pursuing dialysis including death.     hyponatremia  -Likely hypervolemic, use of metolazone  -Significantly worsening azotemia, multiple hyaline cast on urine analysis today, concerning for intravascular volume depletion given recent aggressive diuresis.  -Status post tolvaptan 7.5 mg yesterday when sodium level increased up to 128 last night although again dropped to 126 today.  -Monitor repeat serum sodium with gentle IV fluid trial as above.  -Check serum  osmole, urine sodium, urine osmolality  -Continue to avoid further metolazone.  Currently diuretics remains on hold     Anemia in acute illness, transfuse as needed for hemoglobin less than 7.     Volume overload, prior echo shows EF 55%  -Clinically seems hypervolemic with significant third spacing, still remains on stable 3 to O2 via nasal cannula.  -Concern for some intervascular volume depletion with multiple hyaline cast along with significantly worsening azotemia in the setting of total body volume overload  -Briefly remains off diuretics for today.  Receiving gentle times IV fluid trial as above.     Severe AS, status post aortic valve replacement   -Status post thoracentesis on 2/28/2024.    Discussed above plan in detail with primary team regarding gentle timed IV fluid trial, no dialysis as per patient and family, monitoring repeat serum sodium and they agree with above recommendations.      SUBJECTIVE:  Patient seen and examined at bedside.  Patient overall very tired, very poor p.o. intake, decreased appetite.  No obvious nausea or vomiting.  Overall more drowsy and lethargic but opens eyes and answer some basic questions.    OBJECTIVE:  Current Weight: Weight - Scale: 110 kg (241 lb 6.5 oz)  Vitals:    03/05/24 1200   BP: 111/51   Pulse:    Resp:    Temp:    SpO2:        Intake/Output Summary (Last 24 hours) at 3/5/2024 1355  Last data filed at 3/5/2024 1330  Gross per 24 hour   Intake 1557.5 ml   Output 1980 ml   Net -422.5 ml     Wt Readings from Last 3 Encounters:   03/05/24 110 kg (241 lb 6.5 oz)   02/02/24 106 kg (232 lb 12.8 oz)   01/31/24 104 kg (230 lb 3.2 oz)     Temp Readings from Last 3 Encounters:   03/05/24 (!) 96.6 °F (35.9 °C)   01/31/24 (!) 96.8 °F (36 °C) (Tympanic)   01/25/24 (!) 97.3 °F (36.3 °C)     BP Readings from Last 3 Encounters:   03/05/24 111/51   02/02/24 114/76   01/31/24 134/84     Pulse Readings from Last 3 Encounters:   03/05/24 70   02/02/24 81   01/31/24 90         Physical Examination:  Eyes: Mild conjunctival pallor present  Neck: No obvious lymphadenopathy appreciated  Respiratory: Decreased breath sound at base  CVS: 2+ edema legs  GI: Soft, nondistended  CNS: Sleepy, drowsy, opens eyes, oriented x 2 at the time of encounter  Skin: No new rash  Musculoskeletal: No obvious new gross deformity noted    Medications:    Current Facility-Administered Medications:     acetaminophen (TYLENOL) rectal suppository 650 mg, 650 mg, Rectal, Q4H PRN, Yari Peñaloza PA-C    acetaminophen (TYLENOL) tablet 650 mg, 650 mg, Oral, Q6H While awake, Yari Peñaloza PA-C, 650 mg at 03/04/24 2100    amiodarone tablet 200 mg, 200 mg, Oral, Daily With Breakfast, Fidencio Murillo PA-C    aspirin (ECOTRIN LOW STRENGTH) EC tablet 81 mg, 81 mg, Oral, Daily, Yari Peñaloza PA-C, 81 mg at 03/05/24 0923    atorvastatin (LIPITOR) tablet 80 mg, 80 mg, Oral, Daily With Dinner, Yari Peñaloza PA-C, 80 mg at 03/04/24 1716    bisacodyl (DULCOLAX) EC tablet 5 mg, 5 mg, Oral, Daily PRN, Yari Peñaloza PA-C    bisacodyl (DULCOLAX) rectal suppository 10 mg, 10 mg, Rectal, Daily PRN, Yari Peñaloza PA-C, 10 mg at 02/27/24 1104    chlorhexidine (PERIDEX) 0.12 % oral rinse 15 mL, 15 mL, Mouth/Throat, Q12H ANTON, Yari Peñaloza PA-C, 15 mL at 03/05/24 0923    ferrous sulfate tablet 325 mg, 325 mg, Oral, Daily With Breakfast, Yari Peñaloza PA-C, 325 mg at 03/05/24 0923    insulin lispro (HumaLOG) 100 units/mL subcutaneous injection 1-5 Units, 1-5 Units, Subcutaneous, TID AC, 1 Units at 03/02/24 1221 **AND** Fingerstick Glucose (POCT), , , TID AC, Yari Peñaloza PA-C    insulin lispro (HumaLOG) 100 units/mL subcutaneous injection 1-5 Units, 1-5 Units, Subcutaneous, HS, Yari Peñaloza PA-C, 1 Units at 03/02/24 5280    levalbuterol (XOPENEX) inhalation solution 1.25 mg, 1.25 mg, Nebulization, BID, Yari Peñaloza PA-C, 1.25 mg at 03/05/24 0728    magnesium hydroxide  (MILK OF MAGNESIA) oral suspension 30 mL, 30 mL, Oral, Daily PRN, Yari Peñaloza PA-C, 30 mL at 02/27/24 1726    metoclopramide (REGLAN) injection 10 mg, 10 mg, Intravenous, Q6H PRN, Yari Peñaolza PA-C    metoprolol tartrate (LOPRESSOR) tablet 25 mg, 25 mg, Oral, Q12H ANTON, Yari Peñaloza PA-C, 25 mg at 03/05/24 0923    mirtazapine (REMERON) tablet 7.5 mg, 7.5 mg, Oral, HS, Fidencio Murillo PA-C, 7.5 mg at 03/04/24 2100    nystatin (MYCOSTATIN) powder, , Topical, BID, Yari Peñaloza PA-C, Given at 03/05/24 1203    ondansetron (ZOFRAN) injection 4 mg, 4 mg, Intravenous, Q6H PRN, Yari Peñaloza PA-C    oxyCODONE (ROXICODONE) split tablet 2.5 mg, 2.5 mg, Oral, Q4H PRN **OR** oxyCODONE (ROXICODONE) IR tablet 5 mg, 5 mg, Oral, Q4H PRN, Yari Peñaloza PA-C, 5 mg at 02/22/24 1843    pantoprazole (PROTONIX) EC tablet 40 mg, 40 mg, Oral, Early Morning, Yari Peñaloza PA-C, 40 mg at 03/05/24 0652    polyethylene glycol (MIRALAX) packet 17 g, 17 g, Oral, Daily, Yari Peñaloza PA-C, 17 g at 02/27/24 0839    potassium chloride (Klor-Con M20) CR tablet 20 mEq, 20 mEq, Oral, BID, Yari Peñaloza PA-C, 20 mEq at 03/04/24 1716    pramipexole (MIRAPEX) tablet 1 mg, 1 mg, Oral, HS, Yari Peñaloza PA-C, 1 mg at 03/04/24 2100    senna-docusate sodium (SENOKOT S) 8.6-50 mg per tablet 1 tablet, 1 tablet, Oral, BID, Yari Peñaloza PA-C, 1 tablet at 03/02/24 1737    sodium chloride 0.9 % infusion, 50 mL/hr, Intravenous, Continuous, Fidencio Murillo PA-C, Last Rate: 50 mL/hr at 03/05/24 1006, 50 mL/hr at 03/05/24 1006    sodium chloride 3 % inhalation solution 4 mL, 4 mL, Nebulization, BID, Yari Peñaloza PA-C, 4 mL at 03/05/24 0728    temazepam (RESTORIL) capsule 15 mg, 15 mg, Oral, HS PRN, Yari Peñaloza PA-C    warfarin (COUMADIN) tablet 2.5 mg, 2.5 mg, Oral, Once (warfarin), Fidencio Murillo PA-C    Laboratory Results:  Results from last 7 days   Lab Units 03/05/24  7736  03/04/24 2039 03/04/24 0424 03/03/24  1639 03/03/24  1017 03/03/24  0439 03/02/24  2244 03/02/24  1730 03/02/24  1027 03/02/24  0444 03/01/24  1152 03/01/24  0422 02/29/24  1638 02/29/24  0426 02/29/24  0000 02/28/24  1756 02/28/24  1403 02/28/24  0859 02/28/24  0859 02/28/24  0610   WBC Thousand/uL 12.34*  --  10.92*  --   --  10.84*  --   --   --  11.06*  --  10.97*  --  13.28*  --   --   --   --   --  13.40*   HEMOGLOBIN g/dL 8.9*  --  8.5*  --   --  8.8*  --   --   --  8.7*  --  8.5*  --  8.4*  --   --   --   --   --  8.9*   I STAT HEMOGLOBIN g/dl  --   --   --   --   --   --   --   --   --   --   --   --   --   --   --   --  9.9*   < > 10.9*  --    HEMATOCRIT % 28.9*  --  27.0*  --   --  27.4*  --   --   --  28.4*  --  27.6*  --  28.7*  --   --   --   --   --  30.1*   HEMATOCRIT, ISTAT %  --   --   --   --   --   --   --   --   --   --   --   --   --   --   --   --  29*   < > 32*  --    PLATELETS Thousands/uL 334  --  300  --   --  322  --   --   --  319  --  307  --  301  --   --   --   --   --  305   SODIUM mmol/L 126* 128* 125* 126* 125* 125* 124* 127*   < > 125*   < > 127*   < >  --  132*   < >  --   --   --   --    POTASSIUM mmol/L 4.3  --  3.9 3.6 3.5 3.4* 3.4* 3.5   < > 3.6   < > 4.0   < >  --  4.4   < >  --   --   --   --    CHLORIDE mmol/L 76*  --  76* 74* 76* 76* 76* 81*   < > 79*   < > 81*   < >  --  85*   < >  --   --   --   --    CO2 mmol/L 28  --  34* 35* 34* 33* 32 33*   < > 32   < > 30   < >  --  35*   < >  --   --   --   --    CO2, I-STAT mmol/L  --   --   --   --   --   --   --   --   --   --   --   --   --   --   --   --  38*   < > 35*  --    BUN mg/dL 157*  --  144* 138* 136* 144* 143* 137*   < > 136*   < > 126*   < >  --  114*   < >  --   --   --   --    CREATININE mg/dL 2.47*  --  2.50* 2.75* 2.86* 3.07* 2.94* 2.95*   < > 2.90*   < > 3.10*   < >  --  3.27*   < >  --   --   --   --    CALCIUM mg/dL 8.8  --  8.8 9.1 9.1 8.9 8.5 8.4   < > 8.9   < > 8.7   < >  --  8.4   < >  --   --   --    --    MAGNESIUM mg/dL  --   --   --   --   --   --   --   --   --  2.8*  --  3.0*  --   --  2.8*  --   --   --   --   --    GLUCOSE, ISTAT mg/dl  --   --   --   --   --   --   --   --   --   --   --   --   --   --   --   --  114  --  152*  --     < > = values in this interval not displayed.       US kidney and bladder   Final Result by Yaquelin Espino MD (03/01 2157)      No hydronephrosis.      Nondiagnostic evaluation of the urinary bladder as it is collapsed around a Ledesma catheter.      Trace free fluid is noted in the right side of the pelvis.            Workstation performed: [id number]         XR chest portable ICU   Final Result by Raffi Arora MD (02/29 1147)      No significant change.      Persistent dense  left  lung base opacity and hazy right lung base opacity which may represent a combination of effusion and parenchymal opacity.      Enlarged cardiac silhouette. Pulmonary vascular congestion.               Workstation performed: [id number]         IR IN-Patient Thoracentesis   Final Result by Grupo Bearden MD (02/29 1251)   Impression:      Successful ultrasound-guided thoracentesis.      Signed, performed, and dictated by Jo Sood PA-C under the direct supervision of Dr. Grupo Bearden.      Workstation performed: [id number]         XR chest portable   Final Result by Marciano Patterson MD (02/28 1035)      Findings most suggestive of pulmonary edema with pleural effusions, right side more severe than left. Cardiomegaly. Postop changes of the heart. Left-sided pacer.            Workstation performed: [id number]         XR chest portable   Final Result by Raffi Arora MD (02/24 1513)      Stable small right apical pneumothorax.      Persistent dense right and left lung base opacity which may represent a combination of effusion and parenchymal opacity      Workstation performed: [id number]         XR chest portable ICU   Final Result by Wally Foster DO (02/23 9647)      Small right  "apical pneumothorax.      Stable appearance of pulmonary vascular congestion with bilateral layering pleural effusions.      I personally discussed this study with MARGOTH VIEIRA on 2/23/2024 3:46 PM.            Resident: MARLENI TAYLOR      I, the attending radiologist, have reviewed the images and agree with the final report above.      Workstation performed: [id number]         XR chest portable   Final Result by Valentin Courtney MD (02/23 0948)   Diminished pulmonary edema and bilateral pleural effusions      Stable cardiomegaly status post CABG         Workstation performed: [id number]         XR chest portable   Final Result by Sheldon Mccormick MD (02/20 0827)      1. Increasing mild to moderate pulmonary edema. Increasing small-moderate layering pleural effusions with bibasilar atelectasis.      2. Stable moderate enlargement of the cardiac silhouette allowing for differences in technique.      3. ETT now in satisfactory position with tip approximately 3 cm above the orquidea. Other support devices as above.      The study was marked in EPIC for immediate notification.         Workstation performed: [id number]         XR chest portable ICU   Final Result by Sheldon Mccormick MD (02/20 0823)      1. Low positioning of the ETT, 1.5 cm above the orquidea, which has been repositioned on the follow-up radiograph. Other support devices in satisfactory position.      2. Mild pulmonary edema and similar moderate enlargement of the cardiac silhouette.      3. No pneumothorax.            Workstation performed: [id number]             Portions of the record may have been created with voice recognition software. Occasional wrong word or \"sound a like\" substitutions may have occurred due to the inherent limitations of voice recognition software. Read the chart carefully and recognize, using context, where substitutions have occurred.    "

## 2024-03-05 NOTE — CONSULTS
Consultation - Infectious Disease   Natividad Ortiz 83 y.o. female MRN: [id number]  Unit/Bed#: [facility] 428-01 Encounter: [id number]      IMPRESSION & RECOMMENDATIONS:   Impression/Recommendations:  Acute on chronic hypothermia.    Suspect largely due to uremia.  Consider additional role of mediations (beta-blocker).  Prior TSH normal.  No evidence for acute infection.  ROS and exam benign.  UA with pyuria, likely due to Ledesma.  No  symptoms to suggest UTI.  WBC bland.  Blood cultures pending.  Hemodynamically stable despite remaining off antibiotics.  Rec:  Continue to follow closely off antibiotics  Follow up blood cultures  Follow temperatures closely  Follow up TSH  Recheck WBC in AM to monitor infection    Severe azotemia.  In setting of aggressive diuresis.  Hgb stable, making GIB less likely.  Consider risk factor for hypothermia, overall malaise.  Patient declining HD.  Rec:  IVF per Nephrology with close follow-up ongoing    CHAYO on CKD.  With hyponatremia, significant uremia as above.  Cr improving despite worsening BUN.  Rec:  Volume management per Nephrology with close follow-up ongoing  Recheck BMP in AM    Postop pleural effusion.    Status post thoracentesis.    Status post AVR.  For AS.  On 2/19/24.    Afib.  On Xarelto.    Status post PPM.  For sick sinus syndrome.     MO.     DM with DPN.  Risk factor for infection.  Last A1c 8/23 was 5.8    I discussed with Dr. Metz the above plan to continue to follow off antibiotics and attempt to correct uremia.  He agrees with the plan.    Antibiotics:  None    Thank you for this consultation.  We will follow along with you.    HISTORY OF PRESENT ILLNESS:  Reason for Consult: Hypothermia    HPI: Natividad Ortiz is a 83 y.o. female who was initially admitted 2/19 for elective AVR.  Postoperative course notable for high chest tube output, coagulopathy due to Xarelto, persistent hypotension, pulmonary edema, pleural effusion statu post thoracentesis of 950 cc  2/28, acute hypercapnic respiratory failure, hyponatremia, CHAYO treated with diuresis, significant uremia.  Intermittently over past 7-10 days has been noted to have hypothermia, worsening over past 24 hours.  Baseline temps during prior hospitalizations noted to be ~97.  WBC stable.  Blood cultures pending.  Has been off antibiotics with stable BP.  Continues to express that she feels terrible all over and just wants it all to end.   and son at bedside and help provide independent history.  We are asked to comment on further evaluation and management.    In performing this consult, I have reviewed prior admission and outpatient visit records in detail.    REVIEW OF SYSTEMS:  No pain,  No N/V/D.  No cough.  A complete system-based review of systems is otherwise negative.    PAST MEDICAL HISTORY:  Past Medical History:   Diagnosis Date    Arthritis     Atrial fibrillation (HCC)     Carrero's esophagus     last assessed: 1/23/2018    BRCA1 negative     BRCA2 negative     Breast cancer (HCC) 2006    stage 1 (left), given adjuvant radiation with Arimidex x 5 years    Cancer (HCC) 2006    Left Breast, Lumpectomy    Cataract     last assessed: 3/11/2014    Chronic diastolic CHF (congestive heart failure) (HCC) 11/21/2022    Dysplasia of toenail     last assessed: 8/29/2017    Esophageal reflux     GERD (gastroesophageal reflux disease)     Gross hematuria     last assessed: 2/19/2015    Hematuria     Hiatal hernia     History of radiation therapy     Hypertension     Irregular heart beat     AFIB    Mixed sensory-motor polyneuropathy     Neuropathy     Obesity     Pacemaker     Paroxysmal atrial fibrillation (HCC)     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    Peripheral neuropathy     Rectal bleeding     Restless leg syndrome     Shortness of breath     last assessed: 1/11/2016     Past Surgical History:   Procedure Laterality Date    BREAST BIOPSY Left 2006    BREAST LUMPECTOMY Left 2006    onset: 2006     BREAST SURGERY      CARDIAC CATHETERIZATION N/A 9/5/2023    Procedure: Cardiac RHC/LHC;  Surgeon: Patric England MD;  [facility]: [facility];  Service: Cardiology    CARDIAC CATHETERIZATION N/A 9/5/2023    Procedure: Cardiac Coronary Angiogram;  Surgeon: Patric England MD;  [facility];  Service: Cardiology    CARDIAC CATHETERIZATION  9/5/2023    Procedure: Cardiac catheterization;  Surgeon: Patric England MD;  [facility];  Service: Cardiology    CARDIAC PACEMAKER PLACEMENT      x 3 2006    CATARACT EXTRACTION      COLONOSCOPY      CYSTOSCOPY  04/04/2014    diagnostic    HYSTERECTOMY      ALISON BSO; due to fibroid uterus; age 42    IR THORACENTESIS  2/28/2024    KNEE CARTILAGE SURGERY      excision lesion of meniscus or capsule knee    KNEE SURGERY      OOPHORECTOMY Bilateral     age 42    OTHER SURGICAL HISTORY      radiation therapy    CA ARTHRP KNE CONDYLE&PLATU MEDIAL&LAT COMPARTMENTS Left 08/17/2020    Procedure: ARTHROPLASTY KNEE TOTAL;  Surgeon: Melquiades Sterling DO;  Location: [facility];  Service: Orthopedics    CA ARTHRP KNE CONDYLE&PLATU MEDIAL&LAT COMPARTMENTS Right 03/28/2022    Procedure: ARTHROPLASTY KNEE TOTAL W ROBOT - RIGHT;  Surgeon: Melquiades Sterling DO;  Location: [facility];  Service: Orthopedics    CA COLONOSCOPY FLX DX W/COLLJ SPEC WHEN PFRMD N/A 02/08/2017    Procedure: COLONOSCOPY;  Surgeon: Desean Ham MD;  Location: [facility];  Service: Gastroenterology    CA ESOPHAGOGASTRODUODENOSCOPY TRANSORAL DIAGNOSTIC N/A 09/20/2017    Procedure: ESOPHAGOGASTRODUODENOSCOPY (EGD);  Surgeon: Jacob Morrell MD;  Location: [facility];  Service: Gastroenterology    CA RPLCMT AORTIC VALVE OPN W/STENTLESS TISSUE VALVE N/A 2/19/2024    Procedure: REPLACEMENT VALVE AORTIC (AVR) WITH  21mm INSPRIS TISSUE VALVE;  Surgeon: Liang Mccullough DO;  Location:  MAIN OR;  Service: Cardiac Surgery    UPPER GASTROINTESTINAL ENDOSCOPY         FAMILY HISTORY:  Non-contributory    SOCIAL  HISTORY:  Social History     Substance and Sexual Activity   Alcohol Use Not Currently     Social History     Substance and Sexual Activity   Drug Use No     Social History     Tobacco Use   Smoking Status Former    Current packs/day: 0.00    Average packs/day: 1 pack/day for 25.0 years (25.0 ttl pk-yrs)    Types: Cigarettes    Start date: 1955    Quit date: 1980    Years since quittin.4   Smokeless Tobacco Never   Tobacco Comments    Quit over 30 years ago; quit age 47       ALLERGIES:  Allergies   Allergen Reactions    Latex      Added based on information entered during case entry, please review and add reactions, type, and severity as needed    Duloxetine Hcl Other (See Comments)     Facial pins and needles sensation    Erythromycin Rash    Levofloxacin Other (See Comments)     Muscular aches    Penicillins Rash    Savella [Milnacipran] Rash    Sulfa Antibiotics Rash       MEDICATIONS:  All current active medications have been reviewed, including antibiotics as outlined above.    PHYSICAL EXAM:  Vitals:  Temp:  [94.4 °F (34.7 °C)-97.4 °F (36.3 °C)] 96.6 °F (35.9 °C)  HR:  [64-78] 70  Resp:  [14-18] 16  BP: ()/(57-68) 126/68  SpO2:  [95 %-99 %] 98 %  Temp (24hrs), Av.3 °F (35.7 °C), Min:94.4 °F (34.7 °C), Max:97.4 °F (36.3 °C)  Current: Temperature: (!) 96.6 °F (35.9 °C)     Physical Exam:  General:  Well-nourished, well-developed, in no acute distress  Eyes:  Conjunctive clear with no hemorrhages or effusions  Oropharynx:  No ulcers, no lesions  Neck:  Supple, no lymphadenopathy  Lungs:  Normal respiratory excursion, no accessory muscle use  Cardiac:  Regular rate and rhythm, extremities well perfused  Abdomen:  Soft, non-tender, non-distended  Extremities:  No peripheral cyanosis, clubbing; non-pitting edema  Skin:  No rashes, no ulcers  Neurological:  Moves all four extremities spontaneously, sensation grossly intact    LABS, IMAGING, & OTHER STUDIES:  Lab Results:  I have personally  reviewed pertinent labs.  Results from last 7 days   Lab Units 03/05/24 0453 03/04/24  2039 03/04/24  0424 03/03/24  1639 02/28/24  1756 02/28/24  1403 02/28/24  0859   SODIUM mmol/L 126* 128* 125* 126*   < >  --   --    POTASSIUM mmol/L 4.3  --  3.9 3.6   < >  --   --    CHLORIDE mmol/L 76*  --  76* 74*   < >  --   --    CO2 mmol/L 28  --  34* 35*   < >  --   --    CO2, I-STAT mmol/L  --   --   --   --   --  38* 35*   BUN mg/dL 157*  --  144* 138*   < >  --   --    CREATININE mg/dL 2.47*  --  2.50* 2.75*   < >  --   --    EGFR ml/min/1.73sq m 17  --  17 15   < >  --   --    GLUCOSE, ISTAT mg/dl  --   --   --   --   --  114 152*   CALCIUM mg/dL 8.8  --  8.8 9.1   < >  --   --     < > = values in this interval not displayed.     Results from last 7 days   Lab Units 03/05/24 0453 03/04/24 0424 03/03/24  0439   WBC Thousand/uL 12.34* 10.92* 10.84*   HEMOGLOBIN g/dL 8.9* 8.5* 8.8*   PLATELETS Thousands/uL 334 300 322     Results from last 7 days   Lab Units 02/28/24  1559   GRAM STAIN RESULT  3+ Polys  No bacteria seen   BODY FLUID CULTURE, STERILE  No growth       Imaging Studies:   I have personally reviewed the following radiographic images in PACS:  CXR images reviewed personally cardiomegaly, bibasilar infiltrates

## 2024-03-06 NOTE — CASE MANAGEMENT
Case Management Discharge Planning Note    Patient name Natividad Ortiz  Location [facility] 428/[facility] 428-01 MRN [id number]  : 1940 Date 3/6/2024       Current Admission Date: 2024  Current Admission Diagnosis:S/P AVR   Patient Active Problem List    Diagnosis Date Noted    S/P AVR 2024    Chronic venous hypertension (idiopathic) with ulcer of right lower extremity (CODE) ([facility]) 2024    Cellulitis of right leg 2023    Non-healing wound of left lower extremity 2023    Aortic stenosis, severe 2023    Chronic left shoulder pain 2023    Chronic diastolic CHF (congestive heart failure) ([facility]) 2022    Coagulopathy ([facility]) 2022    Status post total knee replacement using cement, right 2022    Stage 3a chronic kidney disease ([facility]) 10/06/2021    Exotropia of right eye 2021    Seasonal allergies 2021    Leg swelling 2020    Status post total knee replacement using cement, left 2020    Lichen sclerosus et atrophicus of the vulva 2020    Colon polyps 2019    Chronic edema 2019    Deep venous insufficiency 2019    Pacemaker     GERD (gastroesophageal reflux disease)     Mild cognitive impairment 2018    Vitamin D deficiency 2018    Carrero's esophagus with dysplasia 2018    Sensory polyneuropathy 2018    RLS (restless legs syndrome) 2018    Acquired deformity of foot 2018    Corns 2018    Diabetic polyneuropathy associated with type 2 diabetes mellitus (HCC) 2018    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[facility]) 2018    Radiculopathy of lumbar region 2018    Atrial fibrillation ([facility]) [I48.91] 2018    Dense breast tissue on mammogram 2017    Difficulty walking 2017    Flat foot 2017    Onychomycosis 2017    Hiatal hernia     Multiple lung nodules 2015    Severe obesity (BMI 35.0-39.9) with comorbidity ([facility]) 2014    Osteopenia of  multiple sites 04/16/2014    Arthritis 03/11/2014    Essential hypertension 03/11/2014    Lichen sclerosus et atrophicus 05/08/2013    Peripheral neuropathy 05/08/2013      LOS (days): 16  Geometric Mean LOS (GMLOS) (days): 8.7  Days to GMLOS:-7.5     OBJECTIVE:  Risk of Unplanned Readmission Score: 32.71         Current admission status: Inpatient   Preferred Pharmacy:   [facility] [address] [address] [address]  Phone: [phone] Fax: [phone]    [facility] - [place] - [address]  [address]  [place] [address]  Phone: [phone] Fax: [phone]    Primary Care Provider: Sandy Gupta MD    Primary Insurance: MEDICARE  Secondary Insurance: COMMERCIAL MISCELLANEOUS    DISCHARGE DETAILS:                        Additional Comments: CM submitted Hospice referral to [facility] via [facility] per pt family choice. CM will continue to follow as needed.

## 2024-03-06 NOTE — OCCUPATIONAL THERAPY NOTE
Occupational Therapy Cancel Note        Patient Name: Natividad Ortiz  Today's Date: 3/6/2024         03/06/24 1145   OT Last Visit   OT Visit Date 03/06/24   Note Type   Note Type Cancelled Session   Cancel Reasons Medical status     Chart reviewed. Pt currently not medically stable for therapy, with family having ongoing discussion with palliative care, hospice services. Will hold therapy until a decision is made. Will continue to follow.      Dee Russell, MIGULE, OTR/L

## 2024-03-06 NOTE — PROGRESS NOTES
- patient remains lethargic/unresponsive, from uremia  - confirmed with family, would not want HD even if temporary to correct uremia  - family expressed the patients wishes to be made DNR/DNI and comfort care.  - discussed with palliative care, they advised to consult hospice directly as family already made decision   - discussed with Dr. Mccullough and he agreed  - DNR4 order made and hospice order placed, and reviewed with case management and nursing.   - comfort care orders per hospice team.

## 2024-03-06 NOTE — CASE MANAGEMENT
Case Management Discharge Planning Note    Patient name Natividad Ortiz  Location [facility] 428/[facility] 428-01 MRN [id number]  : 1940 Date 3/6/2024       Current Admission Date: 2024  Current Admission Diagnosis:S/P AVR   Patient Active Problem List    Diagnosis Date Noted    S/P AVR 2024    Chronic venous hypertension (idiopathic) with ulcer of right lower extremity (CODE) ([facility]) 2024    Cellulitis of right leg 2023    Non-healing wound of left lower extremity 2023    Aortic stenosis, severe 2023    Chronic left shoulder pain 2023    Chronic diastolic CHF (congestive heart failure) ([facility]) 2022    Coagulopathy ([facility]) 2022    Status post total knee replacement using cement, right 2022    Stage 3a chronic kidney disease ([facility]) 10/06/2021    Exotropia of right eye 2021    Seasonal allergies 2021    Leg swelling 2020    Status post total knee replacement using cement, left 2020    Lichen sclerosus et atrophicus of the vulva 2020    Colon polyps 2019    Chronic edema 2019    Deep venous insufficiency 2019    Pacemaker     GERD (gastroesophageal reflux disease)     Mild cognitive impairment 2018    Vitamin D deficiency 2018    Carrero's esophagus with dysplasia 2018    Sensory polyneuropathy 2018    RLS (restless legs syndrome) 2018    Acquired deformity of foot 2018    Corns 2018    Diabetic polyneuropathy associated with type 2 diabetes mellitus (HCC) 2018    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[facility]) 2018    Radiculopathy of lumbar region 2018    Atrial fibrillation ([facility]) [I48.91] 2018    Dense breast tissue on mammogram 2017    Difficulty walking 2017    Flat foot 2017    Onychomycosis 2017    Hiatal hernia     Multiple lung nodules 2015    Severe obesity (BMI 35.0-39.9) with comorbidity ([facility]) 2014    Osteopenia of  multiple sites 04/16/2014    Arthritis 03/11/2014    Essential hypertension 03/11/2014    Lichen sclerosus et atrophicus 05/08/2013    Peripheral neuropathy 05/08/2013      LOS (days): 16  Geometric Mean LOS (GMLOS) (days): 8.7  Days to GMLOS:-7.6     OBJECTIVE:  Risk of Unplanned Readmission Score: 33.48         Current admission status: Inpatient   Preferred Pharmacy:   [facility] [id number] [place] [address]  [address] [place] [address]  Phone: [phone] Fax: [phone]    [facility] [place] - [address]  [address]  [place] [address]  Phone: [phone] Fax: [phone]    Primary Care Provider: Sandy Gupta MD    Primary Insurance: MEDICARE  Secondary Insurance: COMMERCIAL MISCELLANEOUS    DISCHARGE DETAILS:                         Treatment Team Recommendation: Hospice  Discharge Destination Plan:: Hospice  Transport at Discharge : [facility]        Transported by (Company and Unit #): [employer]  ETA of Transport (Date): 03/06/24  ETA of Transport (Time): 1630                       Additional Comments: Pt is scheduled to be discharged today to [facility] @ 4:30 pm via [employer]. CM will continue to follow as needed.

## 2024-03-06 NOTE — HOSPICE NOTE
Hospice referral received. Pt approved for [facility] by Dr Simone Lamb.     Liaison met with pt family at bedside including spouse, Wally, and son/POA Vicente. Explained hospice services and answered questions. Family in agreement with proceeding with hospice services at [facility].     Vicente signed hospice consents. OOH DNR given to CM Alice Camp to be signed by provider. CM requested transport for 1630 - awaiting confirmation.     Report can be called to [phone].     Liaison updated Dr Farias, Dr Mccullough, and Fidencio BETANCOURT via [employer].

## 2024-03-06 NOTE — PROGRESS NOTES
NEPHROLOGY PROGRESS NOTE   Natividad Ortiz 83 y.o. female MRN: [id number]  Unit/Bed#: [facility] 428-01 Encounter: [id number]  Reason for Consult: CHAYO CKD    ASSESSMENT AND PLAN:  CHAYO on CKD stage III, baseline creatinine 0.9-1.1  -CHAYO suspect in the setting of hemodynamic insult, hypotension, anemia all contributing to ATN  -Peak creatinine 3.5 improved to 2.4 yesterday although again increasing to 2.7 today.   -Now remains off diuretics for time being given significantly worsening azotemia.  -Currently has Ledesma catheter, good urine output over last 24 hours.   -Avoid nephrotoxins or NSAIDs.  -Developing uremic symptoms noted     Severe azotemia with uremic symptoms  -Suspect in the setting of aggressive diuresis, component of GI bleed  -FOBT positive  -BUN continue to progressively worsen up to 162  -Patient received gentle IV fluid trial since yesterday.  -Patient remains overall very lethargic, significantly tired, very poor p.o. intake, significant reduced appetite all likely secondary to uremic symptoms.  -I had extensive discussion with the patient's son Vicente (POA) and patient's  at bedside regarding need for dialysis yesterday and today wanted to pursue conservative management and no dialysis and fully understood risk of not pursuing dialysis including death.  -Again discussed today with family at bedside including patient's son and plan is to pursue comfort focused care and no further dialysis.  -Awaiting palliative care evaluation.     hyponatremia  -Likely hypervolemic, use of metolazone  -Sodium level slightly better at 128 today with IV fluid trial.  -Status post tolvaptan 7.5 mg on 3/4/2024.  -Urine sodium 17, 326.  -As mentioned above, patient is to pursue comfort focused care.     Anemia in acute illness, transfuse as needed for hemoglobin less than 7.     Volume overload, prior echo shows EF 55%  -Clinically seems hypervolemic with significant third spacing, still remains on stable 3L O2 via  nasal cannula.  -Due to significant worsening azotemia, some concern for intravascular ablation and was given gentle IV fluid trial yesterday.  -Will discontinue IV fluid     Severe AS, status post aortic valve replacement   -Status post thoracentesis on 2/28/2024.    Discussed above plan in detail with primary team regarding patient's family not wanting to pursue dialysis, discussion regarding goals of care and they agree with above recommendations.      SUBJECTIVE:  Patient seen and examined at bedside.  She remains overall very lethargic, very tired.  No appetite.    OBJECTIVE:  Current Weight: Weight - Scale: 109 kg (240 lb 4.8 oz)  Vitals:    03/06/24 0721   BP: 112/66   Pulse: 68   Resp: 17   Temp:    SpO2: 98%       Intake/Output Summary (Last 24 hours) at 3/6/2024 1149  Last data filed at 3/6/2024 0721  Gross per 24 hour   Intake 1005 ml   Output 725 ml   Net 280 ml     Wt Readings from Last 3 Encounters:   03/06/24 109 kg (240 lb 4.8 oz)   02/02/24 106 kg (232 lb 12.8 oz)   01/31/24 104 kg (230 lb 3.2 oz)     Temp Readings from Last 3 Encounters:   03/06/24 97.5 °F (36.4 °C) (Rectal)   01/31/24 (!) 96.8 °F (36 °C) (Tympanic)   01/25/24 (!) 97.3 °F (36.3 °C)     BP Readings from Last 3 Encounters:   03/06/24 112/66   02/02/24 114/76   01/31/24 134/84     Pulse Readings from Last 3 Encounters:   03/06/24 68   02/02/24 81   01/31/24 90        Physical Examination:  Eyes: Mild conjunctival pallor present  Neck: No obvious lymphadenopathy appreciated  Respiratory: Decreased breath sound at base  CVS: 2+ edema in legs  GI: Soft, nondistended  CNS: Lethargic, drowsy  Skin: No new rash  Musculoskeletal: No obvious new gross deformity noted    Medications:    Current Facility-Administered Medications:     acetaminophen (TYLENOL) rectal suppository 650 mg, 650 mg, Rectal, Q4H PRN, Yari Peñaloza PA-C    acetaminophen (TYLENOL) tablet 650 mg, 650 mg, Oral, Q6H While awake, Yari Peñaloza PA-C, 650 mg at  03/05/24 1425    amiodarone tablet 200 mg, 200 mg, Oral, Daily With Breakfast, Fidencio Murillo PA-C    aspirin (ECOTRIN LOW STRENGTH) EC tablet 81 mg, 81 mg, Oral, Daily, Yari Peñaloza PA-C, 81 mg at 03/05/24 0923    atorvastatin (LIPITOR) tablet 80 mg, 80 mg, Oral, Daily With Dinner, Yari Peñaloza PA-C, 80 mg at 03/04/24 1716    bisacodyl (DULCOLAX) EC tablet 5 mg, 5 mg, Oral, Daily PRN, Yari Peñaloza PA-C    bisacodyl (DULCOLAX) rectal suppository 10 mg, 10 mg, Rectal, Daily PRN, Yari Peñaloza PA-C, 10 mg at 02/27/24 1104    chlorhexidine (PERIDEX) 0.12 % oral rinse 15 mL, 15 mL, Mouth/Throat, Q12H ANTON, Yari Peñaloza PA-C, 15 mL at 03/05/24 2023    dextrose 5 % and sodium chloride 0.9 % infusion, 50 mL/hr, Intravenous, Continuous, Ifeanyi Metz MD, Last Rate: 50 mL/hr at 03/05/24 1439, 50 mL/hr at 03/05/24 1439    ferrous sulfate tablet 325 mg, 325 mg, Oral, Daily With Breakfast, Yari Peñaloza PA-C, 325 mg at 03/05/24 0923    insulin lispro (HumaLOG) 100 units/mL subcutaneous injection 1-5 Units, 1-5 Units, Subcutaneous, TID AC, 1 Units at 03/06/24 0626 **AND** Fingerstick Glucose (POCT), , , TID AC, Yari Peñaloza PA-C    insulin lispro (HumaLOG) 100 units/mL subcutaneous injection 1-5 Units, 1-5 Units, Subcutaneous, HS, Yari Peñaloza PA-C, 1 Units at 03/02/24 2141    magnesium hydroxide (MILK OF MAGNESIA) oral suspension 30 mL, 30 mL, Oral, Daily PRN, Yari Peñaloza PA-C, 30 mL at 02/27/24 1726    metoclopramide (REGLAN) injection 10 mg, 10 mg, Intravenous, Q6H PRN, Yari Peñaloza PA-C    metoprolol tartrate (LOPRESSOR) partial tablet 12.5 mg, 12.5 mg, Oral, Q12H ANTON, Fidencio Murillo PA-C    mirtazapine (REMERON) tablet 7.5 mg, 7.5 mg, Oral, HS, Fidencio Murillo PA-C, 7.5 mg at 03/04/24 2100    nystatin (MYCOSTATIN) powder, , Topical, BID, Yari Peñaloza PA-C, Given at 03/05/24 1203    ondansetron (ZOFRAN) injection 4 mg, 4 mg, Intravenous, Q6H PRN,  Yari Peñaloza PA-C    oxyCODONE (ROXICODONE) split tablet 2.5 mg, 2.5 mg, Oral, Q4H PRN **OR** oxyCODONE (ROXICODONE) IR tablet 5 mg, 5 mg, Oral, Q4H PRN, Yari Peñaloza PA-C, 5 mg at 02/22/24 1843    pantoprazole (PROTONIX) EC tablet 40 mg, 40 mg, Oral, Early Morning, Yari Peñaloza PA-C, 40 mg at 03/05/24 0652    polyethylene glycol (MIRALAX) packet 17 g, 17 g, Oral, Daily, Yari Peñaloza PA-C, 17 g at 02/27/24 0839    pramipexole (MIRAPEX) tablet 1 mg, 1 mg, Oral, HS, Yari Peñaloza PA-C, 1 mg at 03/05/24 2015    senna-docusate sodium (SENOKOT S) 8.6-50 mg per tablet 1 tablet, 1 tablet, Oral, BID, Yari Peñaloza PA-C, 1 tablet at 03/02/24 1737    temazepam (RESTORIL) capsule 15 mg, 15 mg, Oral, HS PRN, Yari Peñaloza PA-C    Laboratory Results:  Results from last 7 days   Lab Units 03/06/24  0344 03/05/24 2033 03/05/24  0453 03/04/24  2039 03/04/24  0424 03/03/24  1639 03/03/24  1017 03/03/24  0439 03/02/24  2244 03/02/24  1027 03/02/24  0444 03/01/24  1152 03/01/24  0422 02/29/24  1638 02/29/24  0426 02/29/24  0000 02/28/24  1756 02/28/24  1403   WBC Thousand/uL 12.53*  --  12.34*  --  10.92*  --   --  10.84*  --   --  11.06*  --  10.97*  --  13.28*  --   --   --    HEMOGLOBIN g/dL 9.1*  --  8.9*  --  8.5*  --   --  8.8*  --   --  8.7*  --  8.5*  --  8.4*  --   --   --    I STAT HEMOGLOBIN g/dl  --   --   --   --   --   --   --   --   --   --   --   --   --   --   --   --   --  9.9*   HEMATOCRIT % 29.1*  --  28.9*  --  27.0*  --   --  27.4*  --   --  28.4*  --  27.6*  --  28.7*  --   --   --    HEMATOCRIT, ISTAT %  --   --   --   --   --   --   --   --   --   --   --   --   --   --   --   --   --  29*   PLATELETS Thousands/uL 343  --  334  --  300  --   --  322  --   --  319  --  307  --  301  --   --   --    SODIUM mmol/L 128* 127* 126* 128* 125* 126* 125* 125* 124*   < > 125*   < > 127*   < >  --  132*   < >  --    POTASSIUM mmol/L 3.5  --  4.3  --  3.9 3.6 3.5 3.4* 3.4*    < > 3.6   < > 4.0   < >  --  4.4   < >  --    CHLORIDE mmol/L 77*  --  76*  --  76* 74* 76* 76* 76*   < > 79*   < > 81*   < >  --  85*   < >  --    CO2 mmol/L 35*  --  28  --  34* 35* 34* 33* 32   < > 32   < > 30   < >  --  35*   < >  --    CO2, I-STAT mmol/L  --   --   --   --   --   --   --   --   --   --   --   --   --   --   --   --   --  38*   BUN mg/dL 162*  --  157*  --  144* 138* 136* 144* 143*   < > 136*   < > 126*   < >  --  114*   < >  --    CREATININE mg/dL 2.70*  --  2.47*  --  2.50* 2.75* 2.86* 3.07* 2.94*   < > 2.90*   < > 3.10*   < >  --  3.27*   < >  --    CALCIUM mg/dL 9.5  --  8.8  --  8.8 9.1 9.1 8.9 8.5   < > 8.9   < > 8.7   < >  --  8.4   < >  --    MAGNESIUM mg/dL  --   --   --   --   --   --   --   --   --   --  2.8*  --  3.0*  --   --  2.8*  --   --    GLUCOSE, ISTAT mg/dl  --   --   --   --   --   --   --   --   --   --   --   --   --   --   --   --   --  114    < > = values in this interval not displayed.       US kidney and bladder   Final Result by Yaquelin Espino MD (03/01 2157)      No hydronephrosis.      Nondiagnostic evaluation of the urinary bladder as it is collapsed around a Ledesma catheter.      Trace free fluid is noted in the right side of the pelvis.            Workstation performed: [id number]         XR chest portable ICU   Final Result by Raffi Arora MD (02/29 1147)      No significant change.      Persistent dense  left  lung base opacity and hazy right lung base opacity which may represent a combination of effusion and parenchymal opacity.      Enlarged cardiac silhouette. Pulmonary vascular congestion.               Workstation performed: [id number]         IR IN-Patient Thoracentesis   Final Result by Grupo Bearden MD (02/29 1251)   Impression:      Successful ultrasound-guided thoracentesis.      Signed, performed, and dictated by Jo Sood PA-C under the direct supervision of Dr. Grupo Bearden.      Workstation performed: [id number]         XR chest portable    Final Result by Marciano Patterson MD (02/28 1035)      Findings most suggestive of pulmonary edema with pleural effusions, right side more severe than left. Cardiomegaly. Postop changes of the heart. Left-sided pacer.            Workstation performed: [id number]         XR chest portable   Final Result by Raffi Arora MD (02/24 1513)      Stable small right apical pneumothorax.      Persistent dense right and left lung base opacity which may represent a combination of effusion and parenchymal opacity      Workstation performed: [id number]         XR chest portable ICU   Final Result by Wally Foster DO (02/23 1547)      Small right apical pneumothorax.      Stable appearance of pulmonary vascular congestion with bilateral layering pleural effusions.      I personally discussed this study with MARGOTH VIEIRA on 2/23/2024 3:46 PM.            Resident: MARLENI TAYLOR      I, the attending radiologist, have reviewed the images and agree with the final report above.      Workstation performed: [id number]         XR chest portable   Final Result by Valentin Courtney MD (02/23 0948)   Diminished pulmonary edema and bilateral pleural effusions      Stable cardiomegaly status post CABG         Workstation performed: [id number]         XR chest portable   Final Result by Sheldon Mccormick MD (02/20 0827)      1. Increasing mild to moderate pulmonary edema. Increasing small-moderate layering pleural effusions with bibasilar atelectasis.      2. Stable moderate enlargement of the cardiac silhouette allowing for differences in technique.      3. ETT now in satisfactory position with tip approximately 3 cm above the orquidea. Other support devices as above.      The study was marked in EPIC for immediate notification.         Workstation performed: [id number]         XR chest portable ICU   Final Result by Sheldon Mccormick MD (02/20 0823)      1. Low positioning of the ETT, 1.5 cm above the orquidea, which has  "been repositioned on the follow-up radiograph. Other support devices in satisfactory position.      2. Mild pulmonary edema and similar moderate enlargement of the cardiac silhouette.      3. No pneumothorax.            Workstation performed: [id number]             Portions of the record may have been created with voice recognition software. Occasional wrong word or \"sound a like\" substitutions may have occurred due to the inherent limitations of voice recognition software. Read the chart carefully and recognize, using context, where substitutions have occurred.    "

## 2024-03-06 NOTE — PROGRESS NOTES
Progress Note - Infectious Disease   Natividad Ortiz 83 y.o. female MRN: [id number]  Unit/Bed#: [facility] 428-01 Encounter: [id number]      Impression/Recommendations:  Acute on chronic hypothermia.    Suspect largely due to uremia.  Consider additional role of mediations (beta-blocker).  TSH normal.  No evidence for acute infection.  ROS and exam benign.  UA with pyuria, likely due to Ledesma.  No  symptoms to suggest UTI.  WBC bland.  Blood cultures pending.  Hemodynamically stable despite remaining off antibiotics.  Rec:  Continue to follow closely off antibiotics  Follow up blood cultures  Follow temperatures closely  Await possible transition to comfort care     Severe azotemia.  In setting of aggressive diuresis.  Hgb stable, making GIB less likely.  Consider risk factor for hypothermia, overall malaise.  Patient declining HD.  Rec:  Volume management per Nephrology with close follow-up ongoing  Await possible transition to comfort care     CHAYO on CKD.  With hyponatremia, significant uremia as above.  Cr improving despite worsening BUN.  Rec:  Volume management per Nephrology with close follow-up ongoing  Await possible transition to comfort care     Postop pleural effusion.    Status post thoracentesis.     Status post AVR.  For AS.  On 24.     Afib.  On Xarelto.     Status post PPM.  For sick sinus syndrome.     MO.     DM with DPN.  Risk factor for infection.  Last A1c  was 5.8     Antibiotics:  None    Subjective/24 Hour Events:  Patient seen on AM rounds.  Son at bedside.  Patient unresponsive to verbal or physical stimulus.  They are waiting for other family members to come.    Objective:  Vitals:  Temp:  [96.1 °F (35.6 °C)-97.5 °F (36.4 °C)] 97.5 °F (36.4 °C)  HR:  [63-83] 68  Resp:  [17-22] 17  BP: ()/(51-66) 112/66  SpO2:  [94 %-98 %] 98 %  Temp (24hrs), Av.4 °F (35.8 °C), Min:96.1 °F (35.6 °C), Max:97.5 °F (36.4 °C)  Current: Temperature: 97.5 °F (36.4 °C)    Physical Exam:   General:   No acute distress  Psychiatric:   Lethargic, does not respond to voice or light touch  Pulmonary:  Normal respiratory excursion without accessory muscle use  Abdomen:  Soft, nontender  Extremities:  No edema  Skin:  No rashes    Lab Results:  I have personally reviewed pertinent labs.  Results from last 7 days   Lab Units 03/06/24 0344 03/05/24 2033 03/05/24 0453 03/04/24 2039 03/04/24  0424 02/28/24  1756 02/28/24  1403   SODIUM mmol/L 128* 127* 126*   < > 125*   < >  --    POTASSIUM mmol/L 3.5  --  4.3  --  3.9   < >  --    CHLORIDE mmol/L 77*  --  76*  --  76*   < >  --    CO2 mmol/L 35*  --  28  --  34*   < >  --    CO2, I-STAT mmol/L  --   --   --   --   --   --  38*   BUN mg/dL 162*  --  157*  --  144*   < >  --    CREATININE mg/dL 2.70*  --  2.47*  --  2.50*   < >  --    EGFR ml/min/1.73sq m 15  --  17  --  17   < >  --    GLUCOSE, ISTAT mg/dl  --   --   --   --   --   --  114   CALCIUM mg/dL 9.5  --  8.8  --  8.8   < >  --    AST U/L 32  --  58*  --   --   --   --    ALT U/L 12  --  9  --   --   --   --    ALK PHOS U/L 87  --  95  --   --   --   --     < > = values in this interval not displayed.     Results from last 7 days   Lab Units 03/06/24 0344 03/05/24 0453 03/04/24 0424   WBC Thousand/uL 12.53* 12.34* 10.92*   HEMOGLOBIN g/dL 9.1* 8.9* 8.5*   PLATELETS Thousands/uL 343 334 300     Results from last 7 days   Lab Units 03/05/24  1119 02/28/24  1559   BLOOD CULTURE  No Growth at 24 hrs.  No Growth at 24 hrs.  --    GRAM STAIN RESULT   --  3+ Polys  No bacteria seen   BODY FLUID CULTURE, STERILE   --  No growth       Imaging Studies:   I have personally reviewed pertinent imaging study reports and images in PACS.    EKG, Pathology, and Other Studies:   I have personally reviewed pertinent reports.

## 2024-03-06 NOTE — CONSULTS
Consultation - Palliative and Supportive Care   Natividad Ortiz 83 y.o. female [id number]    Patient Active Problem List   Diagnosis    Hiatal hernia    Atrial fibrillation (HCC) [I48.91]    Acquired deformity of foot    Corns    Diabetic polyneuropathy associated with type 2 diabetes mellitus (HCC)    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HCC)    Radiculopathy of lumbar region    Sensory polyneuropathy    RLS (restless legs syndrome)    Arthritis    Essential hypertension    Lichen sclerosus et atrophicus    Multiple lung nodules    Severe obesity (BMI 35.0-39.9) with comorbidity (HCC)    Osteopenia of multiple sites    Peripheral neuropathy    Vitamin D deficiency    Dense breast tissue on mammogram    Difficulty walking    Flat foot    Onychomycosis    Carrero's esophagus with dysplasia    Mild cognitive impairment    Pacemaker    GERD (gastroesophageal reflux disease)    Chronic edema    Deep venous insufficiency    Colon polyps    Lichen sclerosus et atrophicus of the vulva    Status post total knee replacement using cement, left    Leg swelling    Seasonal allergies    Exotropia of right eye    Stage 3a chronic kidney disease (HCC)    Status post total knee replacement using cement, right    Coagulopathy (HCC)    Chronic diastolic CHF (congestive heart failure) (HCC)    Aortic stenosis, severe    Chronic left shoulder pain    Non-healing wound of left lower extremity    Cellulitis of right leg    Chronic venous hypertension (idiopathic) with ulcer of right lower extremity (CODE) (HCC)    S/P AVR     Active issues specifically addressed today include:   Palliative Care Encounter  Goals of care  Pain  CHAYO on CKD  Hypoxic respiratory failure  R pleural effusion  Poor appetite  Post-surgical AVR      Plan:  1. Symptom management -    - d/c all non-comfort focused medications   - dilaudid 0.5mg q1h PRN    - ativan 0.5mg q1h PRN   - haldol 1mg q1h PRN   - O2 via NC for comfort    2. Goals - Discussed with primary and  family.  Family requesting hospice eval. Confirm level 4, comfort care.  Will manage symptoms in interim. Communicated with hospice liaison.    -     Code Status: level 4   Decisional apparatus:  Patient is not competent on my exam today.  If competence is lost, patient's substitute decision maker would default to spouse by [place] Act 169.   Advance Directive / Living Will / POLST:  none on file     I have reviewed the patient's controlled substance dispensing history in the Prescription Drug Monitoring Program in compliance with the [facility] regulations before prescribing any controlled substances.         We appreciate the invitation to be involved in this patient's care.  We will follow.  Please do not hesitate to reach our on call provider through our clinic answering service at [phone] should you have acute symptom control concerns.    Marshall Farias MD  Palliative and Supportive Care  Clinic/Answering Service: [phone]  You can find me on [employer]!       IDENTIFICATION:  Inpatient Consult To Hospice Via Case Management  Consult performed by: Marshall Farias MD  Consult ordered by: Fidencio Murillo PA-C      Inpatient consult to Palliative Care  Consult performed by: Marshall Farias MD  Consult ordered by: Fidencio Murillo PA-C        Physician Requesting Consult: Liang Mccullough DO  Reason for Consult / Principal Problem: goals/symptom mgmt  Hx and PE limited by: patient encephalopathy    NARRATIVE AND INTERVAL HISTORY:       Natividad Ortiz is a 83 y.o. female who presented initially for aortic valve replacement, converted to surgical from TAVR with protracted hospital course complicated by dysrhythmias, respiratory failure, and now progressive renal failure.  Goals of care conversations held with family have decided against dialysis and are opting for comfort measures.  Given the above PSC consulted for support, goals, and symptom mgmt.      Family at bedside.  Report patient has been c/o  pain.  Endorse her chief desire is to be comfortable.  Patient is not verbalizing but does grimace and appears to be uncomfortable in her body language.     Review of Systems   Unable to perform ROS: Mental status change       Past Medical History:   Diagnosis Date    Arthritis     Atrial fibrillation (HCC)     Carrero's esophagus     last assessed: 1/23/2018    BRCA1 negative     BRCA2 negative     Breast cancer ([facility]) 2006    stage 1 (left), given adjuvant radiation with Arimidex x 5 years    Cancer ([facility]) 2006    Left Breast, Lumpectomy    Cataract     last assessed: 3/11/2014    Chronic diastolic CHF (congestive heart failure) ([facility]) 11/21/2022    Dysplasia of toenail     last assessed: 8/29/2017    Esophageal reflux     GERD (gastroesophageal reflux disease)     Gross hematuria     last assessed: 2/19/2015    Hematuria     Hiatal hernia     History of radiation therapy     Hypertension     Irregular heart beat     AFIB    Mixed sensory-motor polyneuropathy     Neuropathy     Obesity     Pacemaker     Paroxysmal atrial fibrillation (HCC)     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[facility]) 02/13/2018    Peripheral neuropathy     Rectal bleeding     Restless leg syndrome     Shortness of breath     last assessed: 1/11/2016     Past Surgical History:   Procedure Laterality Date    BREAST BIOPSY Left 2006    BREAST LUMPECTOMY Left 2006    onset: 2006    BREAST SURGERY      CARDIAC CATHETERIZATION N/A 9/5/2023    Procedure: Cardiac RHC/LHC;  Surgeon: Patric England MD;  [facility];  Service: Cardiology    CARDIAC CATHETERIZATION N/A 9/5/2023    Procedure: Cardiac Coronary Angiogram;  Surgeon: Patric England MD;  [facility];  Service: Cardiology    CARDIAC CATHETERIZATION  9/5/2023    Procedure: Cardiac catheterization;  Surgeon: Patric England MD;  [facility];  Service: Cardiology    CARDIAC PACEMAKER PLACEMENT      x 3 2006    CATARACT EXTRACTION      COLONOSCOPY      CYSTOSCOPY   2014    diagnostic    HYSTERECTOMY      ALISON BSO; due to fibroid uterus; age 42    IR THORACENTESIS  2024    KNEE CARTILAGE SURGERY      excision lesion of meniscus or capsule knee    KNEE SURGERY      OOPHORECTOMY Bilateral     age 42    OTHER SURGICAL HISTORY      radiation therapy    MO ARTHRP KNE CONDYLE&PLATU MEDIAL&LAT COMPARTMENTS Left 2020    Procedure: ARTHROPLASTY KNEE TOTAL;  Surgeon: Melquiades Sterling DO;  Location: [facility];  Service: Orthopedics    MO ARTHRP KNE CONDYLE&PLATU MEDIAL&LAT COMPARTMENTS Right 2022    Procedure: ARTHROPLASTY KNEE TOTAL W ROBOT - RIGHT;  Surgeon: Melquiades Sterling DO;  Location: [facility];  Service: Orthopedics    MO COLONOSCOPY FLX DX W/COLLJ SPEC WHEN PFRMD N/A 2017    Procedure: COLONOSCOPY;  Surgeon: Desean Ham MD;  Location: [facility];  Service: Gastroenterology    MO ESOPHAGOGASTRODUODENOSCOPY TRANSORAL DIAGNOSTIC N/A 2017    Procedure: ESOPHAGOGASTRODUODENOSCOPY (EGD);  Surgeon: Jacob Morrell MD;  Location: [facility];  Service: Gastroenterology    MO RPLCMT AORTIC VALVE OPN W/STENTLESS TISSUE VALVE N/A 2024    Procedure: REPLACEMENT VALVE AORTIC (AVR) WITH  21mm INSPRIS TISSUE VALVE;  Surgeon: Liang Mccullough DO;  Location:  MAIN OR;  Service: Cardiac Surgery    UPPER GASTROINTESTINAL ENDOSCOPY       Social History     Socioeconomic History    Marital status: /Civil Union     Spouse name: Not on file    Number of children: 3    Years of education: 12    Highest education level: High school graduate   Occupational History    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      Comment: retired   Tobacco Use    Smoking status: Former     Current packs/day: 0.00     Average packs/day: 1 pack/day for 25.0 years (25.0 ttl pk-yrs)     Types: Cigarettes     Start date: 1955     Quit date: 1980     Years since quittin.4    Smokeless tobacco: Never    Tobacco comments:     Quit over 30 years ago; quit age 47    Vaping Use    Vaping status: Never Used   Substance and Sexual Activity    Alcohol use: Not Currently    Drug use: No    Sexual activity: Not on file   Other Topics Concern    Not on file   Social History Narrative         Social Determinants of Health     Financial Resource Strain: Low Risk  (11/21/2022)    Overall Financial Resource Strain (CARDIA)     Difficulty of Paying Living Expenses: Not very hard   Food Insecurity: No Food Insecurity (2/21/2024)    Hunger Vital Sign     Worried About Running Out of Food in the Last Year: Never true     Ran Out of Food in the Last Year: Never true   Transportation Needs: No Transportation Needs (2/21/2024)    PRAPARE - Transportation     Lack of Transportation (Medical): No     Lack of Transportation (Non-Medical): No   Physical Activity: Not on file   Stress: Not on file   Social Connections: Feeling Socially Integrated (1/5/2024)    [facility]    OASIS : Social Isolation     Frequency of experiencing loneliness or isolation: Never   Intimate Partner Violence: Not on file   Housing Stability: Low Risk  (2/21/2024)    Housing Stability Vital Sign     Unable to Pay for Housing in the Last Year: No     Number of Places Lived in the Last Year: 1     Unstable Housing in the Last Year: No     Family History   Problem Relation Age of Onset    Hypertension Mother     Heart disease Father     Aneurysm Father     Coronary artery disease Father         in his 70s with aneurysm    Aortic aneurysm Father         abdominal    Scleroderma Sister     Breast cancer Sister 68    Hypertension Sister     Cancer Sister     No Known Problems Son     No Known Problems Son     Testicular cancer Son 41    Thyroid cancer Son 38    Colon cancer Maternal Aunt     Colon cancer Maternal Aunt     Breast cancer Other 50        kaylee's daughter    Alcohol abuse Neg Hx     Substance Abuse Neg Hx     Mental illness Neg Hx     Depression Neg Hx        MEDICATIONS /  ALLERGIES:    all current active meds have been reviewed and current meds:   Current Facility-Administered Medications   Medication Dose Route Frequency    bisacodyl (DULCOLAX) rectal suppository 10 mg  10 mg Rectal Daily PRN    chlorhexidine (PERIDEX) 0.12 % oral rinse 15 mL  15 mL Mouth/Throat Q12H [facility]    haloperidol lactate (HALDOL) injection 1 mg  1 mg Intravenous Q1H PRN    HYDROmorphone (DILAUDID) injection 0.5 mg  0.5 mg Intravenous Q1H PRN    LORazepam (ATIVAN) injection 0.5 mg  0.5 mg Intravenous Q1H PRN    metoclopramide (REGLAN) injection 10 mg  10 mg Intravenous Q6H PRN    nystatin (MYCOSTATIN) powder   Topical BID       Allergies   Allergen Reactions    Latex      Added based on information entered during case entry, please review and add reactions, type, and severity as needed    Duloxetine Hcl Other (See Comments)     Facial pins and needles sensation    Erythromycin Rash    Levofloxacin Other (See Comments)     Muscular aches    Penicillins Rash    Savella [Milnacipran] Rash    Sulfa Antibiotics Rash       OBJECTIVE:    Physical Exam  Physical Exam  Vitals and nursing note reviewed.   Constitutional:       General: She is in acute distress.      Appearance: She is ill-appearing. She is not toxic-appearing or diaphoretic.   HENT:      Head: Normocephalic and atraumatic.      Right Ear: External ear normal.      Left Ear: External ear normal.      Nose:      Comments: O2 via NC     Mouth/Throat:      Mouth: Mucous membranes are moist.   Eyes:      General:         Right eye: No discharge.         Left eye: No discharge.   Cardiovascular:      Rate and Rhythm: Normal rate.   Pulmonary:      Effort: No respiratory distress.   Abdominal:      General: There is distension.      Palpations: Abdomen is soft.      Tenderness: There is no abdominal tenderness.   Musculoskeletal:         General: Swelling present.      Right lower leg: Edema present.      Left lower leg: Edema present.      Comments: Edema in  b/l UEs/LEs   Skin:     General: Skin is warm and dry.      Coloration: Skin is pale. Skin is not jaundiced.      Findings: Bruising present. No erythema.   Neurological:      Comments: Opens eyes to exam but not verbalizing.  Does appear to track. Showing non-verbal pain signs including jaw clenching, brow furling, non-purposeful movement of hands and feet.          Lab Results: I have personally reviewed pertinent labs., CBC:   Lab Results   Component Value Date    WBC 12.53 (H) 03/06/2024    HGB 9.1 (L) 03/06/2024    HCT 29.1 (L) 03/06/2024    MCV 90 03/06/2024     03/06/2024    RBC 3.22 (L) 03/06/2024    MCH 28.3 03/06/2024    MCHC 31.3 (L) 03/06/2024    RDW 16.9 (H) 03/06/2024    MPV 10.2 03/06/2024    NRBC 0 03/06/2024   , CMP:   Lab Results   Component Value Date    SODIUM 128 (L) 03/06/2024    K 3.5 03/06/2024    CL 77 (L) 03/06/2024    CO2 35 (H) 03/06/2024     (H) 03/06/2024    CREATININE 2.70 (H) 03/06/2024    CALCIUM 9.5 03/06/2024    AST 32 03/06/2024    ALT 12 03/06/2024    ALKPHOS 87 03/06/2024    EGFR 15 03/06/2024   , BMP:  Lab Results   Component Value Date    SODIUM 128 (L) 03/06/2024    K 3.5 03/06/2024    CL 77 (L) 03/06/2024    CO2 35 (H) 03/06/2024     (H) 03/06/2024    CREATININE 2.70 (H) 03/06/2024    GLUC 128 03/06/2024    CALCIUM 9.5 03/06/2024    AGAP 16 03/06/2024    EGFR 15 03/06/2024     Imaging Studies: I personally reviewed relevant reports  EKG, Pathology, and Other Studies: I presonally reviewed relevant reports    Counseling / Coordination of Care    Total floor / unit time spent today 60 minutes. Greater than 50% of total time was spent with the patient and / or family counseling and / or coordination of care. A description of the counseling / coordination of care: patient assessment, med review, med changes, code statuts confirmation, hospice d/c planning, support to family, complex med mgt.

## 2024-03-06 NOTE — DISCHARGE SUMMARY
Discharge Summary - Cardiothoracic Surgery   Natividad Ortiz 83 y.o. female MRN: [id number]  Unit/Bed#: [facility] 428-01 Encounter: [id number]    Admission Date: 2/19/2024     Discharge Date: 03/06/24    Admitting Diagnosis: Nonrheumatic aortic valve stenosis [I35.0]    Primary Discharge Diagnosis:   Aortic stenosis, Non-Rheumatic. S/P aortic valve replacement;    Secondary Discharge Diagnosis:   HTN, PAF (ablation in 2005, multiple failed cardioversions, on Xarelto), obesity (BMI 38), multiple lung nodules, GERD, Carrero's esophagus, venous insufficiency, lumbar radiculopathy, peripheral neuropathy, left breast cancer (s/p lumpectomy and radiation treatment in 2006), restless leg syndrome, PPM for SSS    Attending: Liang Mccullough D.O.    Consulting Physician(s):   Cardiology  Medical critical care  Palliative Care  Gerontology  Infectious Disease  Nephrology    Procedures Performed:   Procedure(s):  REPLACEMENT VALVE AORTIC (AVR) WITH  21mm INSPRIS TISSUE VALVE     Hospital Course:   The patient was seen in consultation prior to this admission for evaluation of Aortic stenosis, Non-Rheumatic.  Risks and benefits of aortic valve replacement were discussed in detail, and patient was agreeable.  Routine preoperative evaluation was completed and informed consent was obtained prior to admission.    2/19: Elective admission for AVR # 21mm Inspiris.   Intraoperative blood products include: 1 Plt.   Transferred to ICU supported with Epi 2, Vaso 0.04, Milrinone 0.25, Austin 25.  Postoperative coagulopathy - given Amicar, Protamine 50mg, 1 Plt, 3 FFP, 1 PRBC, 2 Cryo and Factor VII. Wean towards extubation once coagulopathy resolved.      2/20: Postoperative received 500ml LR and 500 albumin secondary to hypotension.  Received 4 units FFP, 2 units of cryo, 1 unit platelets, 1 unit PRBC, Amicar, protamine, and factor VII due to coagulopathic bleeding.  Received amnio bolus x 2 and amnio drip at 1 for ectopy and atrial  fibrillation with improvements normal sinus this morning.  Milrinone weaned to 0.13 @ 3am with adequate CI remains intubated with episodes of Hypoxia requiring Peep 8, FiO2 60%. Levophed 10 mcg/min, Vasopressin 0.08 Units/min, Epinephrine 2 mcg/min, and Primacor 0.13 mcg/kg/min. Tmax 100.6, +6.7L, goal net neg. today, RLL effusion. Cr up 1.38. Continue to milrinone. SBT. Transition to SSI. Continue ICU care.     2/21: A-fib with RVR yesterday morning requiring amnio bolus x 2, dig to 50 x 2, esmolol bolus with improvement.  Tolerated spontaneous 8/8 yesterday.  Received 40 of Lasix yesterday morning followed by 80 of Lasix x 2 yesterday afternoon, followed by 2 Bumex, followed by Lasix infusion and 10 of metolazone.  Continues to tolerate vasopressor wean. Levophed 2 mcg/min, Vasopressin 0.04 Units/min, and Primacor 0.13 mcg/kg/min. Net even. Hgb 7.3, Cr 1.86. Possible extubate, net neg goal.      2/22: On primacor 0.13, remains intubated on vent, Fio2 40%. Hgb 7.2, CI 2.6, -790, Cr 2.29 (baseline 1.1), Cont bumex gtt for more net negative. Keep a-line/swan. Start lopressor 12.5 BID. Cont amio. Keep in ICU. PM: Extubated to NC. Milrinone discontinued. Delined. On Bumex gtt at 2, given Zaroxolyn x1, working on getting (-).      2/23: On Bumex 2, net (-) 1.6cc/24hrs, Cr 2.2 from 2.4 (baseline Cr 1.0), UOP 125cc/hr, goal still (-) today. Tachypneic QHS, intermittently on BiPAP, now off. INR 1.8, dose Coumadin tonight. Increase Lopressor. Get OOB & work on pulm toilet. Keep in ICU. PM: AM CXR with small right apical PTX (present yest AM as well), monitor, check pCXR in AM, reviewed with ICU.      2/24: 4LNC day, Bipap overnight.  Bumex up to 4mg/hr and zaroxoyln daily. In Afib on Coumadin 2.5 mg INR 1.83. Lopressor 50 mg bid. Net neg 1.1L/24h, now u/o better with increase in Bumex. No noted PTX this morning on CXR. Cr 2.72 (2.68) baseline of 1.1.  Dysphagia diet per speech. Hgb 7.6 - add fe/vitamin C.      2/25: Bumex  1mg/h net neg 2.4/24h. 4LNC.  Coumadin 5 mg , INR 1.71. Hgb 8.1. Keep bumex today. Deconditioning.  Transition to PT/OT. Cr 2.1 today. Speech following Dysphagia 3 diet     2/26: Sats good on 4L NC. Afib rate controlled 70s. INR1.61, coumadin 5mg today. Cont lop 50 BID. Stop Bumex gtt, start Bumex 1mg IV TID. Cr 2.21. -1.9L. D.c noble. SSI. Will need rehab. PM: BPs soft in low 90s, lopressor reduced to 25mg BID in PM and ordered IV albumin 25% Q8H x 3 doses, adjusted diuretic parameter.      2/27: Afib 70s. BP 90- 100s. Cont lopressor 25 BID. INR 1.88, Coumadin 5mg today. On 3L NC. Cr 2.61 from 2.2 (baseline 1.0). Decrease to Bumex 1mg BID. Cont albumin 25% q8. SSI. Hgb 7.5, consider transfusing 1 u PRBCs today. Will need Rehab. Increase bowel regimen     2/28: Lethargic and somnolent this am. ABG hypercarbic and hypoxic. Poor UO. Cr 3 from 2.8, trending up. Sent to ICU placed on Bipap and bumex gtt started. PM: Right thoracentesis for 950cc sanguinous. Seen by nephrology for CHAYO.     2/29: Remains in ICU. No pressor requirements. Overnight, continuous BiPAP weaned to MFNC 8L with intermittent BiPAP use. Remains on Bumex gtt 2mg/hr. Afib 90’s. INR 2.96, dose Coumadin 1mg tonight. Hg 8.4, Cr 3.27. -1.3L/24 hrs. Wean Midflow as able. Maintain noble, continue Bumex infusion. Maintain ICU level of care.      3/1: Continues on Bumex gtt. Weaned to regular NC. Wore BiPAP overnight. Continue bumex for goal negative. Cr peaked at 3.5, now downtrending. Hold Coumadin. Dalton hugger for hypothermia. PM: Good UOP throughout day. Transfer to stepdown but keep in ICU unless bed needed.     3/2: Making urine on bumex gtt, cont metolazone. Watch NA, 125 today. Cont metoprolol. INR 2.46, coumadin 1mg. Wean O2. Renal following, Cr 2.9. Keep noble. Tx to SD     3/3: VSS. Rate controlled Afib 60-70s. Cont lop 25 BID. INR 21.3, coumadin 2.5mg today. Cont bumex gtt, metolazone 5mg q8, monitor NA. Cr 3.07 from 2.9, renal following. SSI.  PT/OT. Hgb stable 8.8     3/4: HR 60s-80s, 3LNC, hgb 8.5, Cr down to 2.50 (2.75, 2.86, 3.07), Na down to 125, stop metolazone, stop bumex gtt, change to bumex 2mg IV TID. D/W renal regarding . Keep noble for now. Patient stating she wants to give up and stop everything because she feels so lousy and feels so weak. Extremely weak and deconditioned. Educated and encouraged, nothing that we can stop to make her pass, and that she is actually improving. Reviewed with son. Consult geriatrics for any potential medications - start remeron 7.5mg QHS which should help depression, sleep and stimulate appetite.  Nephrol ordering Samsca 7.5mg for persistent hyponatremia. Low body temps requiring warming blanket.      3/5: afib rate controlled, 2LNC, BUN up to 157 (144, 138), Cr down 2.47, feels lousy/poor likely related to high BUN and NA, no appetite and not eating, also likely related to BUN. Consult nutrition services. Cont remeron. Cont warming blanket for hypothermia. Check UA, check LFTs, check TSH, check blood cultures.  Cont noble for CHAYO and previous retention. C/S ID to rule infection, hold off any antibiotics at this time per ID,      3/6: BUN conts to uptrend to 162, now neurologically unresponsive but HD stable. Pt and family would not want HD even if temporary. Consult palliative care, hospice care. Made DNR/DNI, Level 4 Comfort Measures. For [facility] to [facility] this afternoon.     Condition at Discharge:   poor     Discharge Physical Exam:    Please see the documented physical exam from this morning's progress note for details.    Discharge Data:  Results from last 7 days   Lab Units 03/06/24  0344 03/05/24 0453 03/04/24  0424   WBC Thousand/uL 12.53* 12.34* 10.92*   HEMOGLOBIN g/dL 9.1* 8.9* 8.5*   HEMATOCRIT % 29.1* 28.9* 27.0*   PLATELETS Thousands/uL 343 334 300     Results from last 7 days   Lab Units 03/06/24  0344 03/05/24  0453 03/04/24  0424   POTASSIUM mmol/L 3.5 4.3 3.9   CHLORIDE mmol/L  77* 76* 76*   CO2 mmol/L 35* 28 34*   BUN mg/dL 162* 157* 144*   CREATININE mg/dL 2.70* 2.47* 2.50*   CALCIUM mg/dL 9.5 8.8 8.8     Results from last 7 days   Lab Units 03/06/24  0344 03/05/24  0453 03/04/24  0424   INR  2.24* 2.04* 2.00*       Discharge instructions/Information to patient and family:   See after visit summary for information provided to patient and family.        Provisions for Follow-Up Care:  Not applicable    Disposition:  [facility]    Planned Readmission:   No    Discharge Medications:  See after visit summary for reconciled discharge medications provided to patient and family.      Natividad Ortiz was counseled on the importance of avoiding tobacco products.  As with all patients whom have undergone open heart surgery, tobacco cessation medication was contraindicated at the time of discharge.     ACE/ARB was   not prescribed, due to DNR/Level 4 comfort measure status.    Beta Blocker was   not prescribed, due to DNR/Level 4 comfort measure status.    Aspirin was   not prescribed, due to DNR/Level 4 comfort measure status.    Statin was  not prescribed, due to DNR/Level 4 comfort measure status.      The patient was NOT discharged on ongoing diuretic therapy not prescribed, due to DNR/Level 4 comfort measure status.    Narcotic pain medication was prescribed in the form of Dilaudid 0.5mg IV q1 hr PRN.  Prior to prescribing, their prescription profile was reviewed on the [facility] of health prescription drug monitoring program.    During this admission, the patient was questioned on their use of tobacco, alcohol, and illicit/non-prescription drug use in the  previous 24 months. Natividad Ortiz states that they have not used any of these substances in this time frame.    I spent 30 minutes discharging the patient. This time was spent on the day of discharge. I had direct contact with the patient on the day of discharge. Additional documentation is required if more than 30  minutes were spent on discharge.     SIGNATURE: Gege Donnelly PA-C  DATE: March 6, 2024  TIME: 2:17 PM

## 2024-03-07 LAB
APPEARANCE FLD: ABNORMAL
COLOR FLD: ABNORMAL
SITE: ABNORMAL
WBC # FLD MANUAL: 2410 /UL

## 2024-03-08 ENCOUNTER — TELEPHONE (OUTPATIENT)
Age: 84
End: 2024-03-08

## 2024-03-10 LAB
BACTERIA BLD CULT: NORMAL
BACTERIA BLD CULT: NORMAL

## 2024-03-13 LAB — TRANSFUSION STATUS PATIENT QL: NORMAL

## 2024-03-14 ENCOUNTER — HOME CARE VISIT (OUTPATIENT)
Dept: HOME HOSPICE | Facility: HOSPICE | Age: 84
End: 2024-03-14
Payer: MEDICARE

## 2024-03-14 NOTE — CASE COMMUNICATION
Natividad Ortiz, Bereavement Final IDG 3/13/24 FAMILY DECLINED BEREAVEMENT FOLLOW-UP.  : 1940  SOC: 3/6/24  DOD: 3/6/24, <24hrs  Diagnosis: Respiratory Failure  Primary: Son - Vicente Henson was a 83 year old woman at the [facility]. Son Vicente visited. Within minutes of Natividad's arrival she began taking her lasts breaths. Vicente gopi told his mother he loved her. Vicente declined  prayer and requested some time at  bedside. Support given to Vicente who stated he and his brothers knew this was coming but it was still a shock. Vicente stated his father has dementia and memory deficits. He and his brothers were going to talk with their father that evening. Vicente declined bereavement follow-up and refused to provide contact information for his two brothers. Vicente requested we not contact his father.     Call Assignments: Family Declined Bereavement Follow -Up.

## 2024-04-29 NOTE — TELEPHONE ENCOUNTER
----- Message from Sean Degroot sent at 8/16/2023 11:48 AM EDT -----  Regarding: Cath  Please schedule patient for:    Pre TAVR Cardiac Cath: to be scheduled in the next few weeks. Patient has medicare as primary insurance and is getting bloodwork done this week. Please schedule with either Dr. Alba Calhoun, Dr. Carly Meyer, Dr. Concha Enrique or Dr. Stack Marking    To be done at: [address] [place] [address]    To be done by:    Please address any questions regarding this request to Tj Lemos or Dearmaryam Roman,    Thank you. H

## 2024-05-20 NOTE — PROGRESS NOTES
"  Assessment & Plan     Primary focal hyperhidrosis of axilla  No worrisome symptoms or exam findings. Start with below. If not improving can trial other topical alternative and then would consider derm  - aluminum chloride (DRYSOL) 20 % external solution; Apply topically at bedtime          BMI  Estimated body mass index is 27.84 kg/m  as calculated from the following:    Height as of this encounter: 1.803 m (5' 11\").    Weight as of this encounter: 90.5 kg (199 lb 9.6 oz).         Allyssa Ortega is a 57 year old, presenting for the following health issues:  Excessive Sweating (Right arm pit) and Pain (Right arm pit)      5/20/2024    10:34 AM   Additional Questions   Roomed by Savanna RAMIREZ     Pain    History of Present Illness       Reason for visit:  Excessive sweating from right arm pit.  Symptom onset:  More than a month  Symptoms include:  Excessive sweat.  Staining shirts.  Symptom intensity:  Moderate  Symptom progression:  Staying the same  Had these symptoms before:  Yes  Has tried/received treatment for these symptoms:  No    He eats 4 or more servings of fruits and vegetables daily.He consumes 0 sweetened beverage(s) daily.He exercises with enough effort to increase his heart rate 30 to 60 minutes per day.  He exercises with enough effort to increase his heart rate 5 days per week.   He is taking medications regularly.       Jordan Montano is a 57 year old male who presents today for ongoing heavy perspiration, possibly worse lately   Has been going on for years  Occasionally ttp in the arm pit; not regularly; almost like a bruise  Sweating localized to those areas; denies any generalized sweating, fevers or chills          Review of Systems  Constitutional, HEENT, cardiovascular, pulmonary, gi and gu systems are negative, except as otherwise noted.      Objective    /81 (BP Location: Right arm, Patient Position: Sitting, Cuff Size: Adult Large)   Pulse 58   Temp 97.8  F (36.6  C) (Oral)   " Cardiology Follow Up    [facility]  1940  [id number]  [facility]  [address] [facility]  [address]  [address]  [id number] [phone]    1. Chronic atrial fibrillation (HCC)  POCT ECG          Interval History: Followup for afib. Dyspnea with moderate exertion. She would like to switch coumadin to doac. No chest pain. Medical Problems     Problem List     Atrial fibrillation [facility]) [I48.91]    Overview Signed 3/10/2018  8:59 AM by Parisa Iraheta MD     Description: s/p 2 unsuccessful ablation, s/p 2 cardioversion last in 2010, s/p pacemaker. ; on anticoagulation followed by cardiology         Hiatal hernia    Acquired deformity of foot    Corns    Diabetic polyneuropathy associated with type 2 diabetes mellitus ([address])    Overview Signed 3/12/2018 12:21 PM by Parisa Iraheta MD     ?duloxetine           Lab Results   Component Value Date    HGBA1C 5.6 04/12/2023         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[address])    Radiculopathy of lumbar region    Sensory polyneuropathy    RLS (restless legs syndrome)    Arthritis    Essential hypertension    Overview Signed 12/22/2020 12:51 PM by Parisa Iraheta MD     lisinopril         Lichen sclerosus et atrophicus    Multiple lung nodules    Overview Addendum 6/22/2020 10:16 AM by Parisa Iraheta MD     Stable, no further CT. Last CT 2016         Severe obesity (BMI 35.0-39. 9) with comorbidity ([address])    Osteopenia of multiple sites    Peripheral neuropathy    Overview Signed 3/10/2018  8:59 AM by Parisa Iraheta MD     Transitioned From: Neuropathy         Vitamin D deficiency    Dense breast tissue on mammogram    Difficulty walking    Flat foot    Onychomycosis    Carrero's esophagus with dysplasia    Overview Signed 6/3/2021  8:14 AM by Parisa Iraheta MD     EGD 7/20         Mild cognitive impairment    Pacemaker    GERD (gastroesophageal reflux disease) "Resp 16   Ht 1.803 m (5' 11\")   Wt 90.5 kg (199 lb 9.6 oz)   SpO2 97%   BMI 27.84 kg/m    Body mass index is 27.84 kg/m .  Physical Exam   GENERAL: alert and no distress  NECK: no adenopathy, no asymmetry, masses, or scars  LYMPH: axillary: no adenopathy  AXILLA: no masses noted  BREAST: no ttp or masses    Diagnostics: reviewed labs from [place]        Signed Electronically by: Juan Ramon Staley PA-C    " Chronic edema    Deep venous insufficiency    Colon polyps    Lichen sclerosus et atrophicus of the vulva    Status post total knee replacement using cement, left    Leg swelling    Seasonal allergies    Exotropia of right eye    Stage 3a chronic kidney disease (HCC)    Lab Results   Component Value Date    EGFR 49 04/12/2023    EGFR 61 11/09/2022    EGFR 57 06/20/2022    CREATININE 1.06 04/12/2023    CREATININE 0.89 11/09/2022    CREATININE 0.94 06/20/2022         Status post total knee replacement using cement, right    Chronic anticoagulation    Chronic diastolic CHF (congestive heart failure) ([address])    Wt Readings from Last 3 Encounters:   07/28/23 103 kg (226 lb)   07/19/23 103 kg (227 lb)   06/22/23 103 kg (227 lb 6.4 oz)                 Status post total right knee replacement using cement        Past Medical History:   Diagnosis Date   • Arthritis    • Atrial fibrillation ([address])    • Carrero's esophagus     last assessed: 1/23/2018   • BRCA1 negative    • BRCA2 negative    • Breast cancer ([address]) 2006    stage 1 (left), given adjuvant radiation with Arimidex x 5 years   • Cancer ([address]) 2006    Left Breast, Lumpectomy   • Cataract     last assessed: 3/11/2014   • Chronic diastolic CHF (congestive heart failure) ([address]) 11/21/2022   • Dysplasia of toenail     last assessed: 8/29/2017   • Esophageal reflux    • GERD (gastroesophageal reflux disease)    • Gross hematuria     last assessed: 2/19/2015   • Hematuria    • Hiatal hernia    • History of radiation therapy    • Hypertension    • Irregular heart beat     AFIB   • Mixed sensory-motor polyneuropathy    • Neuropathy    • Obesity    • Pacemaker    • Paroxysmal atrial fibrillation (HCC)    • Peripheral neuropathy    • Rectal bleeding    • Restless leg syndrome    • Shortness of breath     last assessed: 1/11/2016     Social History     Socioeconomic History   • Marital status: /Civil Union     Spouse name: Not on file   • Number of children: Not on file   • Years of education: Not on file   • Highest education level: Not on file   Occupational History   • 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      Comment: retired   Tobacco Use   • Smoking status: Former     Packs/day: 1.00     Years: 25.00     Total pack years: 25.00     Types: Cigarettes     Quit date: 1980     Years since quittin.8   • Smokeless tobacco: Never   • Tobacco comments:     Quit over 30 years ago; quit age 41   Vaping Use   • Vaping Use: Never used   Substance and Sexual Activity   • Alcohol use: Not Currently   • Drug use: No   • Sexual activity: Not on file   Other Topics Concern   • Not on file   Social History Narrative         Social Determinants of Health     Financial Resource Strain: Low Risk  (2022)    Overall Financial Resource Strain (CARDIA)    • Difficulty of Paying Living Expenses: Not very hard   Food Insecurity: No Food Insecurity (2022)    Hunger Vital Sign    • Worried About Running Out of Food in the Last Year: Never true    • Ran Out of Food in the Last Year: Never true   Transportation Needs: No Transportation Needs (2022)    PRAPARE - Transportation    • Lack of Transportation (Medical): No    • Lack of Transportation (Non-Medical):  No   Physical Activity: Not on file   Stress: Not on file   Social Connections: Not on file   Intimate Partner Violence: Not on file   Housing Stability: Low Risk  (2022)    Housing Stability Vital Sign    • Unable to Pay for Housing in the Last Year: No    • Number of Places Lived in the Last Year: 1    • Unstable Housing in the Last Year: No      Family History   Problem Relation Age of Onset   • Hypertension Mother    • Heart disease Father    • Aneurysm Father    • Coronary artery disease Father         in his 76s with aneurysm   • Aortic aneurysm Father         abdominal   • Scleroderma Sister    • Breast cancer Sister 76   • Hypertension Sister    • Cancer Sister    • No Known Problems Son    • No Known Problems Son    • Testicular cancer Son 39   • Thyroid cancer Son 45   • Colon cancer Maternal Aunt    • Colon cancer Maternal Aunt    • Breast cancer Other 48        kaylee's daughter   • Alcohol abuse Neg Hx    • Substance Abuse Neg Hx    • Mental illness Neg Hx    • Depression Neg Hx      Past Surgical History:   Procedure Laterality Date   • BREAST BIOPSY Left 2006   • BREAST LUMPECTOMY Left 2006    onset: 2006   • BREAST SURGERY     • CARDIAC PACEMAKER PLACEMENT      x 3 2006   • CATARACT EXTRACTION     • COLONOSCOPY     • CYSTOSCOPY  04/04/2014    diagnostic   • HYSTERECTOMY      ALISON BSO; due to fibroid uterus; age 38   • KNEE CARTILAGE SURGERY      excision lesion of meniscus or capsule knee   • KNEE SURGERY     • OOPHORECTOMY Bilateral     age 38   • OTHER SURGICAL HISTORY      radiation therapy   • IL ARTHRP KNE CONDYLE&PLATU MEDIAL&LAT COMPARTMENTS Left 08/17/2020    Procedure: ARTHROPLASTY KNEE TOTAL;  Surgeon: Shahana Fine DO;  Location: [facility];  Service: Orthopedics   • IL ARTHRP KNE CONDYLE&PLATU MEDIAL&LAT COMPARTMENTS Right 03/28/2022    Procedure: ARTHROPLASTY KNEE TOTAL W ROBOT - RIGHT;  Surgeon: Shahana Fine DO;  Location: [facility];  Service: Orthopedics   • IL COLONOSCOPY FLX DX W/COLLJ SPEC WHEN PFRMD N/A 02/08/2017    Procedure: COLONOSCOPY;  Surgeon: Amy Deng MD;  Location:  GI LAB; Service: Gastroenterology   • IL ESOPHAGOGASTRODUODENOSCOPY TRANSORAL DIAGNOSTIC N/A 09/20/2017    Procedure: ESOPHAGOGASTRODUODENOSCOPY (EGD); Surgeon: Dominic Gutiérrez MD;  Location: [facility];   Service: Gastroenterology   • UPPER GASTROINTESTINAL ENDOSCOPY         Current Outpatient Medications:   •  acetaminophen (TYLENOL) 325 mg tablet, Take 3 tablets (975 mg total) by mouth every 8 (eight) hours, Disp: , Rfl: 0  •  Calcium Acetate, Phos Binder, (CALCIUM ACETATE PO), Take by mouth daily  , Disp: , Rfl:   •  clobetasol (TEMOVATE) 0.05 % ointment, Apply once weekly to the affected area, Disp: 30 g, Rfl: 3  •  famotidine (PEPCID) 40 MG tablet, TAKE 1 TABLET BY MOUTH EVERY DAY, Disp: 90 tablet, Rfl: 1  •  fluticasone (FLONASE) 50 mcg/act nasal spray, SPRAY 1 SPRAY INTO EACH NOSTRIL EVERY DAY, Disp: 48 mL, Rfl: 3  •  gabapentin (NEURONTIN) 800 mg tablet, TAKE 1 TABLET BY MOUTH FOUR TIMES A DAY, Disp: 360 tablet, Rfl: 1  •  loratadine (CLARITIN) 10 mg tablet, Take 10 mg by mouth daily, Disp: , Rfl:   •  magnesium 30 MG tablet, Take 30 mg by mouth in the morning, Disp: , Rfl:   •  metoprolol tartrate (LOPRESSOR) 50 mg tablet, TAKE 1.5 TABLETS BY MOUTH 2 TIMES A DAY., Disp: 270 tablet, Rfl: 3  •  multivitamin (THERAGRAN) TABS, Take 1 tablet by mouth daily, Disp: , Rfl:   •  pramipexole (MIRAPEX) 0.5 mg tablet, TAKE 2 FULL TABLETS TWICE A DAY AT 5:00 P. M.  AND 10:00 P.M., Disp: 360 tablet, Rfl: 1  •  torsemide (DEMADEX) 20 mg tablet, Take 1 tablet (20 mg total) by mouth daily, Disp: 90 tablet, Rfl: 3  •  warfarin (COUMADIN) 7.5 mg tablet, TAKE 1 TABLET BY MOUTH EVERY DAY, Disp: 90 tablet, Rfl: 3  Allergies   Allergen Reactions   • Latex      Added based on information entered during case entry, please review and add reactions, type, and severity as needed   • Cephalexin Rash   • Duloxetine Hcl Other (See Comments)     Facial pins and needles sensation   • Erythromycin Rash   • Levofloxacin Other (See Comments)     Muscular aches   • Penicillins Rash   • Savella [Milnacipran] Rash   • Sulfa Antibiotics Rash       Labs:     Chemistry        Component Value Date/Time     11/09/2015 1115    K 5.2 04/12/2023 1142    K 4.5 11/09/2015 1115     04/12/2023 1142     11/09/2015 1115    CO2 32 04/12/2023 1142    CO2 30 (H) 11/09/2015 1115    BUN 31 (H) 04/12/2023 1142    BUN 20 11/09/2015 1115    CREATININE 1.06 04/12/2023 1142    CREATININE 0.8 11/09/2015 1115        Component Value Date/Time    CALCIUM 9.8 04/12/2023 1142    CALCIUM 9.0 11/09/2015 1115    ALKPHOS 88 11/09/2022 1434    ALKPHOS 61 11/09/2015 1115    AST 27 11/09/2022 1434    AST 23 11/09/2015 1115    ALT 26 11/09/2022 1434    ALT 31 11/09/2015 1115    BILITOT 0.5 11/09/2015 1115            Lab Results   Component Value Date    CHOL 162 11/09/2015     Lab Results   Component Value Date    HDL 35 (L) 11/09/2022    HDL 45 10/25/2021    HDL 40 12/08/2020     Lab Results   Component Value Date    LDLCALC 53 11/09/2022    LDLCALC 80 10/25/2021    LDLCALC 61 12/08/2020     Lab Results   Component Value Date    TRIG 94 11/09/2022    TRIG 69 10/25/2021    TRIG 69 12/08/2020     No results found for: "CHOLHDL"    Imaging: Cardiac EP device report    Result Date: 7/17/2023  Narrative: MDT-SINGLE CHAMBER PPM/ NOT MRI CONDITIONAL NON-BILLABLE CARELINK TRANSMISSION: BATTERY STATUS: BATTERY VOLTAGE NEARING ISABELA (8 MOS~<1-19 MOS). WILL SCHEDULE MONTHLY BATTERY CHECKS. : 29.3%. ALL AVAILABLE LEAD PARAMETERS WITHIN NORMAL LIMITS. [address] [place] [address] [phone] BPM, MAX DURATION 2 MINS 55 SECS. PT TAKES WARFARIN, METOPROLOL TART. EF: 55% (ECHO 6/1/23). NORMAL DEVICE FUNCTION. [id number] [id number] [facility]       EKG: Atrial Fibrillation     Review of Systems   Constitutional: Negative. HENT: Negative. Eyes: Negative. Cardiovascular: Negative. Respiratory: Negative. Endocrine: Negative. Hematologic/Lymphatic: Negative. Skin: Negative. Musculoskeletal: Negative. Gastrointestinal: Negative. Genitourinary: Negative. Neurological: Negative. Psychiatric/Behavioral: Negative. Allergic/Immunologic: Negative. Vitals:    07/28/23 1116   BP: 118/76   Pulse: 86           Physical Exam  Vitals and nursing note reviewed. Constitutional:       Appearance: Normal appearance. HENT:      Head: Normocephalic. Nose: Nose normal.      Mouth/Throat:      Mouth: Mucous membranes are moist.   Eyes:      General: No scleral icterus.      Conjunctiva/sclera: Conjunctivae normal.   Cardiovascular:      Rate and Rhythm: Normal rate. Rhythm irregular. Heart sounds: Murmur heard. No gallop. Pulmonary:      Effort: Pulmonary effort is normal. No respiratory distress. Breath sounds: Normal breath sounds. No wheezing or rales. Abdominal:      General: Abdomen is flat. Bowel sounds are normal. There is no distension. Palpations: Abdomen is soft. Tenderness: There is no abdominal tenderness. There is no guarding. Musculoskeletal:      Cervical back: Normal range of motion and neck supple. Right lower leg: No edema. Left lower leg: No edema. Skin:     General: Skin is warm and dry. Neurological:      General: No focal deficit present. Mental Status: She is alert and oriented to person, place, and time. Psychiatric:         Mood and Affect: Mood normal.         Behavior: Behavior normal.         Discussion/Summary:    1. Aortic Stenosis: GABRIEL 0.8 cm2. She has shortness of breath. Will refer for TAVR evaluation. 2. Chronic Diastolic CHF: NYHA 2. Continue with torsemide at current dosing.      3. Chronic Atrial Fibrillation: Overall rate controlled. Continue Metoprolol. Stable on Coumadin. We will switch to xarelto and coordinate this with coumadin clinic.      4. Hypertension: Her BP is well controlled. Continue current medical therapy.       5. s/p PPM: Continue device checks. She is approaching ISABELA for gen change. The patient was counseled regarding diagnostic results, instructions for management, risk factor reductions, impressions. total time of encounter was 25 minutes and 15 minutes was spent counseling.

## 2024-06-10 NOTE — PROGRESS NOTES
Assessment/Plan:    No problem-specific Assessment & Plan notes found for this encounter  Diagnoses and all orders for this visit:    Sore throat  Comments:  (+) strep throat  Start clindamycin since with multiple allergies  Start saline gargles, monitor for fevers  Orders:  -     POCT rapid strepA  -     clindamycin (CLEOCIN) 300 MG capsule; Take 1 capsule (300 mg total) by mouth 3 (three) times a day for 10 days    Breast pain, left  Comments:  Check mammogram due to breast asymmetry  Orders:  -     Mammo diagnostic left w cad; Future    Urinary symptom or sign  Comments:  some WBC in U/A  Recheck next week, can start Cipro  Orders:  -     POCT urine dip  -     UA (URINE) with reflex to Microscopic; Future      Follow up as scheduled  Subjective:      Patient ID: Mavis Mccauley is a 78 y o  female  Mrs Ortiz complains of a sore throat  This started today, no other sympotms  No fever or chills  No sick contacts  She complains of an "indentation" by her left breast  She reports it does not look normal, some pulling of her breast towards her back, not sure if it is due to her surgery  Breast cancer was on her left breast  She only noticed this last week  She reports urinary incontinence, wears Depends pads all the time  She would urinate several times at night, with frequent leakage  She was not interested in tests or medication in the past, feels she needs something now  Denies any dysuria, foul odor, hematuria  The following portions of the patient's history were reviewed and updated as appropriate: allergies, current medications, past medical history, past social history and problem list     Review of Systems   Constitutional: Negative for fatigue and fever  HENT: Positive for sore throat  Respiratory: Negative for cough  Cardiovascular: Negative for chest pain  Gastrointestinal: Negative for abdominal pain  Genitourinary: Positive for frequency   Negative for decreased Chief Complaint   Patient presents with    ER F/U     Fall hit head       HISTORY OF PRESENT ILLNESS:  David Acosta is a 53 year old male who is here for followup of recent headache he had from twice a day bumping his head.  Originally had happened on May 21st or day before that eventually with headache and going to the ER.  Head CT was negative that time.  Subsequently bumped his head again on the gilman back of his car in around the same going to have small abrasion but no bleeding, drainage or bruising his wife could see she states.  Currently only mild sensitivity on top of the head where he bumped it twice but more symptoms behind both ears in the mastoid area.  Had some dizziness and some more perception of the ringing he has had previously but more evident now in both ears.  Held some pressure behind both ears.  No visual changes, nausea or vomiting.  Had taken some Tylenol or couple times some ibuprofen.  No unusual change in memory issues or hearing.  Does have some sinus issues but only able to take Claritin because of his history with the nasal bleeding in the past and not really allowed to use the nasal sprays like he did before per his ENT doctor.  Had his IN[facility] checked this [facility] and states it was 2.4.  He states that same day when he bumped his head the 2nd time on the gilman back of his car which was the previous Friday  Or May 31st it appears, he also had done some rota tilling in his yard which also seemed to make all his muscles throughout his body tighten up.  He went to his chiropractor 3 times in the past week including last Friday does seem to help somewhat temporarily.  Did take his muscle relaxer which is the methocarbamol last night but just made him more groggy.  He is little bit foggy this morning.  He does have a history of vertigo in the past and last had done in 2015.  Feels some increased stress because of how he has been feeling and limitation he has on  medicines he can take his health conditions or other medicines.  Did not sleep well last 2/3 nights.  Try the hydroxyzine but again limited due to his sinus issues.  States ENT told him he can not get his sinuses to dry it out.  His blood sugars can be a little bit lower towards the afternoon.  Also be interested in getting reestablish GI associates more locally.  He did just start the fodmap diet and seemed like bloating is currently controlled than it was couple days ago.  Previously GI recommended but he has not done it consistently.  REVIEW OF SYSTEMS:  Otherwise negative.    ALLERGIES:   Allergen Reactions    Apple   (Food Or Med) ANAPHYLAXIS    Clindamycin Hcl [Cleocin] ANAPHYLAXIS    Histamine ANAPHYLAXIS    Lubiprostone SHORTNESS OF BREATH    Penicillins ANAPHYLAXIS    Tree Nuts    (Food) ANAPHYLAXIS    Lisinopril Cough    Amlodipine Other (See Comments)     Lightheadedness     Prednisone Other (See Comments)     Blood sugars frequent changes     Sertraline Other (See Comments)     Current Outpatient Medications   Medication Sig Dispense Refill    tadalafil (CIALIS) 5 MG tablet Take 1 tablet by mouth daily. 90 tablet 0    Accu-Chek Guide test strip USE TO CHECK BLOOD SUGAR 4  TIMES DAILY 400 strip 3    losartan (COZAAR) 25 MG tablet Take 0.5 tablets by mouth daily. 50 tablet 3    warfarin (COUMADIN) 1 MG tablet (warfarin) Take 1 tablet by mouth nightly.  Take with 6 mg on Sun/Mon/Wed/Fri/Sat or as directed. 90 tablet 3    hydrOXYzine (ATARAX) 25 MG tablet Take 1 tablet by mouth 2 times daily as needed for Anxiety. 20 tablet 0    rosuvastatin (CRESTOR) 10 MG tablet Take 1 tablet by mouth daily. 90 tablet 3    montelukast (SINGULAIR) 10 MG tablet Take 1 tablet by mouth nightly. 90 tablet 3    celecoxib (CeleBREX) 200 MG capsule TAKE 1 CAPSULE BY MOUTH DAILY AS NEEDED FOR PAIN 100 capsule 2    clobetasol (TEMOVATE) 0.05 % cream Apply twice daily to trunk and extremities as needed for rash until resolved. 60 g 0  urine volume, difficulty urinating, dysuria and pelvic pain  Objective:      /86   Pulse 104   Temp (!) 97 4 °F (36 3 °C)   Resp 18   Ht 5' 4" (1 626 m)   Wt 108 kg (237 lb 9 6 oz)   SpO2 99%   BMI 40 78 kg/m²          Physical Exam   Constitutional: She appears well-developed  HENT:   Head: Normocephalic  Mouth/Throat: Oropharynx is clear and moist and mucous membranes are normal    Eyes: Pupils are equal, round, and reactive to light  Pulmonary/Chest: Effort normal and breath sounds normal    Neurological: She is alert  Nursing note and vitals reviewed     triamcinolone (ARISTOCORT) 0.1 % cream Apply twice a day to rash located groin x 2 weeks max. 80 g 0    clobetasol (TEMOVATE) 0.05 % topical solution Apply twice a day to areas on the scalp as needed for scaling 50 mL 11    ketoconazole (NIZORAL) 2 % cream Apply twice daily to groin for rash until resolved. 30 g 5    methocarbamol (ROBAXIN) 750 MG tablet Take 1 tablet by mouth 2 times daily as needed (muscle spasm). 15 tablet 0    ascorbic acid (VITAMIN C) 1000 MG tablet       omeprazole (PriLOSEC) 40 MG capsule TAKE 1 CAPSULE BY MOUTH DAILY ON AN EMPTY STOMACH 30 TO 60  MINUTES BEFORE BREAKFAST      Linzess 145 MCG capsule TAKE 1 CAPSULE BY MOUTH  DAILY BEFORE BREAKFAST 30 capsule 3    cephalexin (KEFLEX) 500 MG capsule Take 500 mg by mouth as needed.      metFORMIN (GLUCOPHAGE-XR) 500 MG 24 hr tablet Take 1 tablet by mouth 3 times daily. 21 tablet 1    Multiple Vitamins-Minerals (MULTIVITAMIN ADULT PO) Take 1 tablet by mouth daily.      Probiotic Product (PROBIOTIC-10 PO) Take 1 tablet by mouth daily.      Omega-3 Fatty Acids (SALMON OIL) 200 MG Cap       aspirin 81 MG chewable tablet Chew 81 mg by mouth daily.      warfarin (COUMADIN) 5 MG tablet Take 1 tablet by mouth daily. Or as directed 10 tablet 0    Cholecalciferol (VITAMIN D) 2000 units capsule Take 1 capsule by mouth 2 times daily. 30 capsule     fluticasone (FLONASE) 50 MCG/ACT nasal spray Spray 1 spray in each nostril daily.      loratadine (CLARITIN) 10 MG tablet Take 10 mg by mouth daily. Indications: Perennial Rhinitis        No current facility-administered medications for this visit.         PHYSICAL EXAMINATION:  Visit Vitals  BP (!) 136/90 (BP Location: RUE - Right upper extremity)   Pulse (!) 59   Temp 97.6 °F (36.4 °C) (Temporal)   Resp 18   Ht 5' 5.98\" (1.676 m)   Wt 78.3 kg (172 lb 11.2 oz)   SpO2 97%   BMI 27.89 kg/m²     GENERAL APPEARANCE:  Not in acute distress.   HEENT: Eyes: EOMI(Extraocular movements intact), sclerae clear.  Scalp  shows no erythema, edema, ecchymosis, or localized tenderness.  No signs of any infection.  Mouth: The oropharynx is clear without any erythema or exudative changes.  Nose: No lesions or drainage are seen.  Ears: The tympanic membranes and external ear canals are normal-appearing bilaterally.  Neck: Supple, no adenopathy  LUNGS:  Clear bilaterally.  CARDIOVASCULAR:  Regular rate and rhythm.  No murmurs, rubs or gallops.  EXTREMITIES:  No edema.  MOOD: calm and appropropriate  NEUROLOGIC:  Non-focal.    Per patient INRs 2.4 which is managed anticoagulation clinic    CT HEAD WO CONTRAST  Order: [id number]  Impression      No acute intracranial findings.    Electronically signed by: Linda Chang MD 5/21/2024 5:06 PM  Narrative      EXAM: CT HEAD WO CONTRAST    EXAM DATE: 5/21/2024 4:58 PM    CLINICAL INDICATIONS:    Diagnoses:  Reason for Exam: Head trauma, moderate-severe  Additional Relevant History: HA    TECHNIQUE: Axial images with multiplanar reformations were obtained of the head.    DOSE REDUCTION:  Iterative Reconstruction radiation dose reduction technique was utilized.    COMPARISON: 4/12/2016    FINDINGS:    INTRACRANIAL HEMORRHAGE: No acute intracranial hemorrhage is identified.    CEREBRUM AND CEREBELLUM: No mass effect. There are no compelling CT findings to indicate the presence of an acute ischemic infarct, however a lack of findings does not exclude this possibility.    VENTRICULAR SYSTEM: Normal in size and morphology for age.    EXTRA-AXIAL SPACES/BASAL CISTERNS: Normal in size and morphology for age.    VISUALIZED ORBITS: Visualized globes and orbital contents are grossly unremarkable.    SKULL BASE AND CALVARIUM: No fractures.    PARANASAL SINUSES AND MASTOID AIR CELLS: Clear.  Exam End: 05/21/24 16:58    Specimen Collected: 05/21/24 17:03 Last Resulted: 05/21/24 17:06   Received From: [facility]  Result Received: 05/22/24 11:59     ASSESSMENT AND PLAN:  Injury of head, subsequent  encounter  (primary encounter diagnosis)  Plan: CT HEAD WO CONTRAST       Call with results and additional recommendations.    Anticoagulated  Plan: CT HEAD WO CONTRAST        INR 2.4 through [facility]    Dizzy  Plan: CT HEAD WO CONTRAST          Acute post-traumatic headache, not intractable  Plan: CT HEAD WO CONTRAST            Vertigo  Plan: SERVICE TO PHYSICAL THERAPY        Meclizine 12.5 t.i.d. as needed    Bloating  Chronic constipation  Plan: SERVICE TO GASTROENTEROLOGY        Better after starting fodmap diet previous recommended by gi    Essential hypertension  Plan:  Mildly high today.  Will monitor    Type 2 diabetes mellitus without complication, without long-term current use of insulin  (CMD)  Plan: Last Lab A1C:  Hemoglobin A1C (%)   Date Value   04/23/2024 6.7 (H)     Last A1c overall controlled.  Continues on same location and follows with Endocrine as well    Anxiety  Plan:  Discussed additional medication like buspirone 5 mg twice a day but will 1st await head CT and then call with the additional recommendations if he would like to consider this    SVT (supraventricular tachycardia) (CMD)  Plan:  Follows with cardiology.

## 2024-07-29 NOTE — TELEPHONE ENCOUNTER
Reason for Disposition   [1] SEVERE post-op pain (e g , excruciating, pain scale 8-10) AND [2] not controlled with pain medications    Answer Assessment - Initial Assessment Questions  1  SYMPTOM: "What's the main symptom you're concerned about?" (e g , pain, fever, vomiting)      Right knee swelling, and pain   2  ONSET: "When did it start?"      04/09/22 worsening at HS  3  SURGERY: "What surgery was performed?"     ARTHROPLASTY KNEE TOTAL W ROBOT - RIGHT (Right Knee)  4  DATE of SURGERY: "When was surgery performed?"       3/28/22  5  ANESTHESIA: " What type of anesthesia did you have?" (e g , general, spinal, epidural, local)      Spinal   6  PAIN: "Is there any pain?" If Yes, ask: "How bad is it?"  (Scale 1-10; or mild, moderate, severe)      Rated 10/10  7  FEVER: "Do you have a fever?" If Yes, ask: "What is your temperature, how was it measured, and when did it start?"     T-Max 99 5 at 1130 PM (patient on regimen of Oxy and Tylenol ATC)  8  VOMITING: "Is there any vomiting?" If yes, ask: "How many times?"      Denies   9  BLEEDING: "Is there any bleeding?" If Yes, ask: "How much?" and "Where?"      Denies   10  OTHER SYMPTOMS: "Do you have any other symptoms?" (e g , drainage from wound, painful urination, constipation)       Redness, warm to touch  Swelling      Protocols used: POST-OP SYMPTOMS AND QUESTIONS-[facility]
Regarding: Knee replacement and swelling  ----- Message from Rojas Dueñas sent at 4/10/2022  5:40 AM EDT -----  " I had knee replacement in March, my knee is super swollen "
S/P ARTHROPLASTY KNEE TOTAL W ROBOT - RIGHT (Right Knee) on 3/28/22 with Lala Preston DO  Patient currently on oxyCODONE (Roxicodone) 5 immediate release tablet (1/2 to 1 tablets by mouth every 4-6 hours PRN) and acetaminophen (TYLENOL) 325 mg tablet (3 tablets (975 mg total) by mouth every 8 (eight) hours) around the clock  c/o worsening pain interfering with sleep, rated 10/10, severe edema, redness, and warmth  Patient report temperature elevation of 99 5 last night at 2330, now resolved to baseline around 97 0  Reports following care advice of elevation, and icing , but symptoms worsening  No additional symptoms at this time  On call provider contacted and informed of patients concerns and status  Provider recommends proceeding to ED for evaluation  Patient informed of providers recommendation  Verbalized understanding and agreeable with disposition  No further questions  [facility]
Admitted

## 2024-08-21 NOTE — PHYSICAL THERAPY NOTE
PT EVALUATION     08/17/20 1410   Pain Assessment   Pain Assessment Tool 0-10   Pain Score 9  (L knee area with movement)   Home Living   Type of Home House   Home Layout Two level; Able to live on main level with bedroom/bathroom;1/2 bath on main level;Stairs to enter with rails  (3 stairs to enter)   [address];Walker   Additional Comments patient using cane prior to admission   Prior Function   Level of [place] Independent with ADLs and functional mobility   Lives With Spouse   ADL Assistance Independent   IADLs Independent   Comments patient independent, caring for house and family as well as driving   Restrictions/Precautions   LLE Weight Bearing Per Order WBAT   Other Precautions Chair Alarm; Bed Alarm; Fall Risk;Pain   General   Additional Pertinent History chart reviewed, patient admitted for L TKA  now POD#0   Family/Caregiver Present No   Cognition   Overall Cognitive Status WFL   Arousal/Participation Cooperative   Orientation Level Oriented X4   Following Commands Follows multistep commands with increased time or repetition   Comments patient distracted at times with mild nausea and pain, nurse aware and obtaining meds from pharmacy   RLE Assessment   RLE Assessment   (ROM WFL, strength 4-/5)   LLE Assessment   LLE Assessment   (ROM knee 0-90, strength 3-/5quad, hip/ankle3+/4-)   Coordination   Movements are Fluid and Coordinated 0   Bed Mobility   Supine to Sit 3  Moderate assistance   Additional items Assist x 1;Verbal cues;LE management   Sit to Supine 3  Moderate assistance   Additional items Assist x 1;Verbal cues;LE management   Transfers   Sit to Stand 3  Moderate assistance   Additional items Assist x 1;Verbal cues   Stand to Sit 4  Minimal assistance   Additional items Assist x 1;Verbal cues   Ambulation/Elevation   Gait Assistance 3  Moderate assist   Additional items Assist x 1;Verbal cues; Tactile cues   Assistive Device Rolling walker   Distance 3-5 steps at bedside with pain in L knee limiting functional mobility and nursing waiting for pain meds from pharmacy, antalgic gait patterning   Balance   Static Sitting Fair +   Dynamic Sitting Fair   Static Standing Fair -   Dynamic Standing Poor +   Ambulatory Poor +   Activity Tolerance   Activity Tolerance Patient limited by fatigue;Patient limited by pain   Nurse Made Aware yes   Assessment   Prognosis Good   Problem List Decreased strength;Decreased endurance;Decreased range of motion; Impaired balance;Decreased mobility; Decreased coordination;Pain   Assessment Patient seen for Physical Therapy evaluation  Patient admitted with Status post total knee replacement using cement, left  Comorbidities affecting patient's physical performance include: OA L knee, afib, DM with diabetic polyneuropathy, radiculopathy lumbar region, RLS, HTN, pacemaker, deep venous insuffiencey, hiatal hernia  Personal factors affecting patient at time of initial evaluation include: lives in two story house, ambulating with assistive device, stairs to enter home, inability to navigate community distances, inability to navigate level surfaces without external assistance, inability to perform dynamic tasks in community, limited home support, inability to perform physical activity and inability to perform IADLS   Prior to admission, patient was independent with functional mobility with cane at times, independent with ADLS, independent with IADLS, living in a multi-level home, ambulating household distance, ambulating community distances and home with family assist   Please find objective findings from Physical Therapy assessment regarding body systems outlined above with impairments and limitations including weakness, decreased ROM, impaired balance, decreased endurance, impaired coordination, gait deviations, pain, decreased activity tolerance, decreased functional mobility tolerance and fall risk    The Barthel Index was used as a functional outcome tool presenting with a score of 35 today indicating marked limitations of functional mobility and ADLS  Patient's clinical presentation is currently unstable/unpredictable as seen in patient's presentation of vital sign response, changing level of pain, increased fall risk, new onset of impairment of functional mobility, decreased endurance and new onset of weakness  Pt would benefit from continued Physical Therapy treatment to address deficits as defined above and maximize level of functional mobility  As demonstrated by objective findings, the assigned level of complexity for this evaluation is high  Goals   Patient Goals to decrease pain and get moving again   STG Expiration Date 08/24/20   Short Term Goal #1 transfers and gait with roller walker independently   Short Term Goal #2 gait endurance to functional household distances, strength LLE 3+/4- all to meet patient goal of increasing mobility and less pain   LTG Expiration Date 08/31/20   Long Term Goal #1 gait endurance to functional community distances   Plan   Treatment/Interventions ADL retraining;Functional transfer training;LE strengthening/ROM; Elevations; Therapeutic exercise; Endurance training;Patient/family training;Equipment eval/education; Bed mobility;Gait training; Compensatory technique education   PT Frequency Twice a day   Recommendation   PT Discharge Recommendation Other (Comment)  (out patient PT(prescheduled to begin 8-20-20))   Barthel Index   Feeding 10   Bathing 0   Grooming Score 0   Dressing Score 5   Bladder Score 0   Bowels Score 10   Toilet Use Score 5   Transfers (Bed/Chair) Score 5   Mobility (Level Surface) Score 0   Stairs Score 0   Barthel Index Score 28   Licensure   [place] License Number  Estee Chiang PT [id number]     PT TREATMENT  Time In:1350  Time Out:1410  Total Time: 15min      S:  Patient with pain in L knee area  O:  -exercise completed: quads sets, ankle pumps, heelslides, LAQs all 5 reps  -education completed for OOB for all meals with nursing assist and to be OOB later this afternoom  A:  Patient will benefit from continued PT with progression as tolerated     P:  Out patient PT    Vernell Bruner, PT Detail Level: Detailed General Sunscreen Counseling: I recommended a broad spectrum sunscreen with a SPF of 30 or higher.  I explained that SPF 30 sunscreens block approximately 97 percent of the sun's harmful rays.  Sunscreens should be applied at least 15 minutes prior to expected sun exposure and then every 2 hours after that as long as sun exposure continues. If swimming or exercising sunscreen should be reapplied every 45 minutes to an hour after getting wet or sweating.  One ounce, or the equivalent of a shot glass full of sunscreen, is adequate to protect the skin not covered by a bathing suit. I also recommended a lip balm with a sunscreen as well. Sun protective clothing can be used in lieu of sunscreen but must be worn the entire time you are exposed to the sun's rays.

## 2025-04-15 NOTE — PROGRESS NOTES
Refill Routing Note   Medication(s) are not appropriate for processing by [facility] for the following reason(s):        Responsible provider unclear    [facility] action(s):  Defer        Medication Therapy Plan: No assigned PCP      Appointments  past 12m or future 3m with PCP    Date Provider   Last Visit   10/20/2023 Odessa Gordon MD   Next Visit   Visit date not found Oedssa Gordon MD   ED visits in past 90 days: 0        Note composed:8:53 PM 04/14/2025           Results for orders placed or performed in visit on 04/25/18   Cardiac EP device report    Narrative    MDT-SINGLE-PM  CARELINK TRANSMISSION: BATTERY STATUS "OK"   16% ALL AVAILABLE LEAD PARAMETERS WITHIN NORMAL LIMITS  16,083 VHRS NOTED, LONGEST 11 MINS  AVAIL EGRAMS PRESENT AS RVR HOWEVER NSVT CAN'T BE EXCLUDED  PT ON METO TART & WARFARIN  EF 65% (2017)  NORMAL DEVICE FUNCTION   NC

## 2025-07-15 NOTE — TELEPHONE ENCOUNTER
Sent to the [facility] office CARE PROVIDERS:  Administration: Nazia Azul  Administration: Ashok Leal  Admitting: Trever Donald  Attending: Trever Donald  Case Management: Santaromana, Anna  Consultant: Shane Anderson  Consultant: John Ng  Covering Team: Steve Fontana  Infection Control: Katherine Santos  Nurse: Jacinto Sampson  Nurse: Baylee Castelan  Occupational Therapy: Ghislaine Kumar  Occupational Therapy: Malachi Judd  PCA/Nursing Assistant: Phuc Milligan  Physical Therapy: Sina Pulido  Primary Team: Emre Parks  Primary Team: Wai Fuentes  Primary Team: Bonifacio Nguyen  Registered Dietitian: Mary Hoffmann  Research: Greg Zuluaga  : Jess Knight  Student: Patrick Dye  Team: SY  Hospitalists, Team  UR// Supp. Assoc.: Carolina Evans

## (undated) DEVICE — SUTURE GUIDE

## (undated) DEVICE — ADHESIVE SKIN CLOSR DERMABOND PRINEO

## (undated) DEVICE — 3000CC GUARDIAN II: Brand: GUARDIAN

## (undated) DEVICE — HOOD: Brand: FLYTE, SURGICOOL

## (undated) DEVICE — BASIC DOUBLE BASIN 2-LF: Brand: MEDLINE INDUSTRIES, INC.

## (undated) DEVICE — PUMP TUBING FUSION PACK

## (undated) DEVICE — ANTIBACTERIAL UNDYED BRAIDED (POLYGLACTIN 910), SYNTHETIC ABSORBABLE SUTURE: Brand: COATED VICRYL

## (undated) DEVICE — CATH F4 INF JR 4 100CM: Brand: INFINITI

## (undated) DEVICE — STERNAL WIRE

## (undated) DEVICE — LIGHT GLOVE GREEN

## (undated) DEVICE — TIBURON EXTREMITY SHEET: Brand: CONVERTORS

## (undated) DEVICE — COBAN 4 IN STERILE

## (undated) DEVICE — SUT ETHIBOND 2-0 V-5/V-5 30 IN PXX52

## (undated) DEVICE — CHLORAPREP HI-LITE 26ML ORANGE

## (undated) DEVICE — ASTOUND SURGICAL GOWN, XXX LARGE, X-LONG: Brand: CONVERTORS

## (undated) DEVICE — SUT SILK 0 CT-1 30 IN 424H

## (undated) DEVICE — 3M™ IOBAN™ 2 ANTIMICROBIAL INCISE DRAPE 6650EZ: Brand: IOBAN™ 2

## (undated) DEVICE — DGW .035 FC J3MM 260CM TEF: Brand: EMERALD

## (undated) DEVICE — BANDAGE, ESMARK LF STR 6"X9' (20/CS): Brand: CYPRESS

## (undated) DEVICE — INTENDED FOR TISSUE SEPARATION, AND OTHER PROCEDURES THAT REQUIRE A SHARP SURGICAL BLADE TO PUNCTURE OR CUT.: Brand: BARD-PARKER ® CARBON RIB-BACK BLADES

## (undated) DEVICE — RECIP.STERNUM SAW BLADE 34/7.5/0.7MM: Brand: AESCULAP

## (undated) DEVICE — 450 ML BOTTLE OF 0.05% CHLORHEXIDINE GLUCONATE IN 99.95% STERILE WATER FOR IRRIGATION, USP AND APPLICATOR.: Brand: IRRISEPT ANTIMICROBIAL WOUND LAVAGE

## (undated) DEVICE — NEEDLE SPINAL 20G X 3.5 LF

## (undated) DEVICE — DUAL CUT SAGITTAL BLADE

## (undated) DEVICE — LINER FOOT PAD STERILE DISPOSABLE

## (undated) DEVICE — TEDS THIGH LG REG

## (undated) DEVICE — TRAY FOLEY 16FR URIMETER SURESTEP

## (undated) DEVICE — INSTRUMENT POUCH: Brand: CONVERTORS

## (undated) DEVICE — GLOVE SRG BIOGEL ECLIPSE 7

## (undated) DEVICE — GLOVE INDICATOR PI UNDERGLOVE SZ 7 BLUE

## (undated) DEVICE — UMBILICAL TAPE: Brand: DEROYAL

## (undated) DEVICE — DRESSING MEPILEX AG BORDER POST-OP 4 X 12 IN

## (undated) DEVICE — VEST SURGEON DISPOSABLE

## (undated) DEVICE — HANDPIECE SET WITH HIGH FLOW TIP AND SUCTION TUBE: Brand: INTERPULSE

## (undated) DEVICE — EVERGRIP INSERT SET 61MM: Brand: FOGARTY EVERGRIP

## (undated) DEVICE — GLOVE SRG BIOGEL ORTHOPEDIC 8.5

## (undated) DEVICE — AIRLIFE™ TRI-FLO™ SUCTION CATHETER: Brand: AIRLIFE™

## (undated) DEVICE — DRESSING MEPILEX AG BORDER 4 X 12 IN

## (undated) DEVICE — ADHESIVE SKIN HIGH VISCOSITY EXOFIN 1ML

## (undated) DEVICE — CAPIT KNEE ATTUNE FB CEMENT - DEPUY

## (undated) DEVICE — Device: Brand: ASAHI SILVERWAY

## (undated) DEVICE — ORTHOPEDIC PACK: Brand: CARDINAL HEALTH

## (undated) DEVICE — HEMOSTATIC MATRIX SURGIFLO 8ML W/THROMBIN

## (undated) DEVICE — GLOVE INDICATOR PI UNDERGLOVE SZ 8 BLUE

## (undated) DEVICE — VELYS ROBOTIC-ASSISTED SOLUTION ARRAY SET - KNEE: Brand: VELYS

## (undated) DEVICE — SKIN MARKER DUAL TIP WITH RULER CAP, FLEXIBLE RULER AND LABELS: Brand: DEVON

## (undated) DEVICE — GLIDESHEATH BASIC HYDROPHILIC COATED INTRODUCER SHEATH: Brand: GLIDESHEATH

## (undated) DEVICE — TRAY FOLEY 16FR SURESTEP TEMP SENS URIMETER STAT LOK

## (undated) DEVICE — ELECTRODE BLADE E-Z CLEAN 4IN -0014A

## (undated) DEVICE — DRESSING ALLEVYN LIFE HEEL 25 X 25.2CM

## (undated) DEVICE — PACK CUSTOM PERFUSION ANH

## (undated) DEVICE — RED RUBBER URETHRAL CATHETER: Brand: DOVER

## (undated) DEVICE — 40601 PROLONGED POSITIONING SYSTEM: Brand: 40601 PROLONGED POSITIONING SYSTEM

## (undated) DEVICE — LIGHT HANDLE COVER SLEEVE DISP BLUE STELLAR

## (undated) DEVICE — BONE WAX WHITE: Brand: BONE WAX WHITE

## (undated) DEVICE — VELYS ROBOT DRAPE

## (undated) DEVICE — NEEDLE 18 G X 1 1/2 SAFETY

## (undated) DEVICE — RADIFOCUS OPTITORQUE ANGIOGRAPHIC CATHETER: Brand: OPTITORQUE

## (undated) DEVICE — SUT ETHIBOND 2-0 SH-1/SH-1 30 IN X763H

## (undated) DEVICE — 32 FR RIGHT ANGLE – SOFT PVC CATHETER: Brand: PVC THORACIC CATHETERS

## (undated) DEVICE — 3M™ STERI-DRAPE™ U-DRAPE 1015: Brand: STERI-DRAPE™

## (undated) DEVICE — SUT VICRYL 3-0 SH 27 IN J416H

## (undated) DEVICE — PREP SURGICAL PURPREP 26ML

## (undated) DEVICE — SUT STRATAFIX SPIRAL 3-0 PGA/PCL 30 X 30 CM SXMD2B410

## (undated) DEVICE — PLEDGET CARDIO PTFE 9.5 X 4.8 SOFT LF (6EA/PK)

## (undated) DEVICE — SUT PDS PLUS 1 CTB 36 IN PDPB359T

## (undated) DEVICE — 32 FR STRAIGHT – SOFT PVC CATHETER: Brand: PVC THORACIC CATHETERS

## (undated) DEVICE — GLOVE INDICATOR PI UNDERGLOVE SZ 7.5 BLUE

## (undated) DEVICE — 3 BONE CEMENT MIXER: Brand: MIXEVAC

## (undated) DEVICE — SUT SILK 2 60 IN SA8H

## (undated) DEVICE — RADIFOCUS GLIDEWIRE: Brand: GLIDEWIRE

## (undated) DEVICE — PACK CUSTOM PERFUSION PLEG PK

## (undated) DEVICE — SUT VICRYL 0 CP-1 27 IN J267H

## (undated) DEVICE — SUT STRATAFIX SPIRAL 1-0  CT-1 30 X 30CM SXPD2B403

## (undated) DEVICE — CUFF TOURNIQUET 34 X 4 IN QUICK CONNECT DISP 1BLA

## (undated) DEVICE — SUT PROLENE 4-0 BB 36 IN 8581H

## (undated) DEVICE — SILVER-COATED ANTIBACTERIAL BARRIER DRESSING: Brand: ACTICOAT SURGIC 10X25CM 5PK US

## (undated) DEVICE — 3M™ DURAPORE™ SURGICAL TAPE 1538-3, 3 INCH X 10 YARD (7,5CM X 9,1M), 4 ROLLS/BOX: Brand: 3M™ DURAPORE™

## (undated) DEVICE — DRAPE SHEET X-LG

## (undated) DEVICE — OASIS DRAIN, SINGLE, INLINE & ATS COMPATIBLE: Brand: OASIS

## (undated) DEVICE — THERMOFLECT BLANKET, L, 25EA                               TS THERMOFLECT BLANKET, 48" X 84", SILVER, 5/BG, 5 BG/CS NW: Brand: THERMOFLECT

## (undated) DEVICE — PACK VALVE PBDS

## (undated) DEVICE — SUT SILK 3-0 SH CR/8 18 IN C013D

## (undated) DEVICE — GLOVE INDICATOR PI UNDERGLOVE SZ 8.5 BLUE

## (undated) DEVICE — BLANKET HYPOTHERMIA ADULT GAYMAR

## (undated) DEVICE — SUT PROLENE 4-0 SH 36 IN 8521H

## (undated) DEVICE — GLOVE SRG BIOGEL 7.5

## (undated) DEVICE — GLOVE SRG BIOGEL 8

## (undated) DEVICE — VELYS BLADE SAW OSC 85 X 19 X 2MM

## (undated) DEVICE — SYRINGE 10ML LL CONTROL TOP

## (undated) DEVICE — GAUZE SPONGES,16 PLY: Brand: CURITY

## (undated) DEVICE — SPINNING CEMENT MIXING BOWL

## (undated) DEVICE — FILTER SMOKE EVAC VIROSAFE

## (undated) DEVICE — HI-TORQUE WHISPER MS GUIDE WIRE .014 STRAIGHT TIP 3.0 CM X 300 CM: Brand: HI-TORQUE WHISPER

## (undated) DEVICE — PLUMEPEN PRO 10FT

## (undated) DEVICE — VELYS SATEL DRAPE

## (undated) DEVICE — THE VASC BAND HEMOSTAT IS A COMPRESSION DEVICE TO ASSIST HEMOSTASIS OF ARTERIAL, VENOUS AND HEMODIALYSIS PERCUTANEOUS ACCESS SITES.: Brand: VASC BAND™ HEMOSTAT